# Patient Record
Sex: MALE | Race: OTHER | NOT HISPANIC OR LATINO | URBAN - METROPOLITAN AREA
[De-identification: names, ages, dates, MRNs, and addresses within clinical notes are randomized per-mention and may not be internally consistent; named-entity substitution may affect disease eponyms.]

---

## 2019-01-18 ENCOUNTER — INPATIENT (INPATIENT)
Facility: HOSPITAL | Age: 69
LOS: 0 days | Discharge: HOME | DRG: 287 | End: 2019-01-18
Attending: INTERNAL MEDICINE | Admitting: INTERNAL MEDICINE
Payer: COMMERCIAL

## 2019-01-18 ENCOUNTER — TRANSCRIPTION ENCOUNTER (OUTPATIENT)
Age: 69
End: 2019-01-18

## 2019-01-18 VITALS
DIASTOLIC BLOOD PRESSURE: 84 MMHG | OXYGEN SATURATION: 98 % | SYSTOLIC BLOOD PRESSURE: 166 MMHG | TEMPERATURE: 98 F | HEART RATE: 74 BPM | RESPIRATION RATE: 18 BRPM | WEIGHT: 210.1 LBS

## 2019-01-18 DIAGNOSIS — I25.10 ATHEROSCLEROTIC HEART DISEASE OF NATIVE CORONARY ARTERY WITHOUT ANGINA PECTORIS: ICD-10-CM

## 2019-01-18 DIAGNOSIS — Z90.49 ACQUIRED ABSENCE OF OTHER SPECIFIED PARTS OF DIGESTIVE TRACT: Chronic | ICD-10-CM

## 2019-01-18 DIAGNOSIS — I10 ESSENTIAL (PRIMARY) HYPERTENSION: ICD-10-CM

## 2019-01-18 DIAGNOSIS — Z98.890 OTHER SPECIFIED POSTPROCEDURAL STATES: Chronic | ICD-10-CM

## 2019-01-18 DIAGNOSIS — I48.2 CHRONIC ATRIAL FIBRILLATION: ICD-10-CM

## 2019-01-18 LAB
ALBUMIN SERPL ELPH-MCNC: 4.5 G/DL — SIGNIFICANT CHANGE UP (ref 3.3–5)
ALP SERPL-CCNC: 126 U/L — HIGH (ref 40–120)
ALT FLD-CCNC: 96 U/L — HIGH (ref 10–45)
ANION GAP SERPL CALC-SCNC: 10 MMOL/L — SIGNIFICANT CHANGE UP (ref 5–17)
APTT BLD: 32.8 SEC — SIGNIFICANT CHANGE UP (ref 27.5–36.3)
AST SERPL-CCNC: 48 U/L — HIGH (ref 10–40)
BASOPHILS NFR BLD AUTO: 0.6 % — SIGNIFICANT CHANGE UP (ref 0–2)
BILIRUB SERPL-MCNC: 1.2 MG/DL — SIGNIFICANT CHANGE UP (ref 0.2–1.2)
BUN SERPL-MCNC: 22 MG/DL — SIGNIFICANT CHANGE UP (ref 7–23)
CALCIUM SERPL-MCNC: 9.1 MG/DL — SIGNIFICANT CHANGE UP (ref 8.4–10.5)
CHLORIDE SERPL-SCNC: 106 MMOL/L — SIGNIFICANT CHANGE UP (ref 96–108)
CK MB CFR SERPL CALC: 2.5 NG/ML — SIGNIFICANT CHANGE UP (ref 0–6.7)
CO2 SERPL-SCNC: 27 MMOL/L — SIGNIFICANT CHANGE UP (ref 22–31)
CREAT SERPL-MCNC: 0.67 MG/DL — SIGNIFICANT CHANGE UP (ref 0.5–1.3)
EOSINOPHIL NFR BLD AUTO: 3.5 % — SIGNIFICANT CHANGE UP (ref 0–6)
GLUCOSE SERPL-MCNC: 152 MG/DL — HIGH (ref 70–99)
HCT VFR BLD CALC: 45.5 % — SIGNIFICANT CHANGE UP (ref 39–50)
HGB BLD-MCNC: 15.2 G/DL — SIGNIFICANT CHANGE UP (ref 13–17)
INR BLD: 1.11 — SIGNIFICANT CHANGE UP (ref 0.88–1.16)
LYMPHOCYTES # BLD AUTO: 36.9 % — SIGNIFICANT CHANGE UP (ref 13–44)
MCHC RBC-ENTMCNC: 29.8 PG — SIGNIFICANT CHANGE UP (ref 27–34)
MCHC RBC-ENTMCNC: 33.4 G/DL — SIGNIFICANT CHANGE UP (ref 32–36)
MCV RBC AUTO: 89.2 FL — SIGNIFICANT CHANGE UP (ref 80–100)
MONOCYTES NFR BLD AUTO: 9.2 % — SIGNIFICANT CHANGE UP (ref 2–14)
NEUTROPHILS NFR BLD AUTO: 49.8 % — SIGNIFICANT CHANGE UP (ref 43–77)
PLATELET # BLD AUTO: 80 K/UL — LOW (ref 150–400)
POTASSIUM SERPL-MCNC: 3.9 MMOL/L — SIGNIFICANT CHANGE UP (ref 3.5–5.3)
POTASSIUM SERPL-SCNC: 3.9 MMOL/L — SIGNIFICANT CHANGE UP (ref 3.5–5.3)
PROT SERPL-MCNC: 6.8 G/DL — SIGNIFICANT CHANGE UP (ref 6–8.3)
PROTHROM AB SERPL-ACNC: 12.6 SEC — SIGNIFICANT CHANGE UP (ref 10–12.9)
RBC # BLD: 5.1 M/UL — SIGNIFICANT CHANGE UP (ref 4.2–5.8)
RBC # FLD: 14.9 % — SIGNIFICANT CHANGE UP (ref 10.3–16.9)
SODIUM SERPL-SCNC: 143 MMOL/L — SIGNIFICANT CHANGE UP (ref 135–145)
TROPONIN T SERPL-MCNC: <0.01 NG/ML — SIGNIFICANT CHANGE UP (ref 0–0.01)
WBC # BLD: 3.1 K/UL — LOW (ref 3.8–10.5)
WBC # FLD AUTO: 3.1 K/UL — LOW (ref 3.8–10.5)

## 2019-01-18 PROCEDURE — 71046 X-RAY EXAM CHEST 2 VIEWS: CPT | Mod: 26

## 2019-01-18 PROCEDURE — 93458 L HRT ARTERY/VENTRICLE ANGIO: CPT | Mod: 26

## 2019-01-18 PROCEDURE — 99285 EMERGENCY DEPT VISIT HI MDM: CPT

## 2019-01-18 PROCEDURE — 99223 1ST HOSP IP/OBS HIGH 75: CPT | Mod: AI

## 2019-01-18 RX ORDER — DEXTROSE 50 % IN WATER 50 %
12.5 SYRINGE (ML) INTRAVENOUS ONCE
Refills: 0 | Status: DISCONTINUED | OUTPATIENT
Start: 2019-01-18 | End: 2019-01-18

## 2019-01-18 RX ORDER — SODIUM CHLORIDE 9 MG/ML
1000 INJECTION, SOLUTION INTRAVENOUS
Refills: 0 | Status: DISCONTINUED | OUTPATIENT
Start: 2019-01-18 | End: 2019-01-18

## 2019-01-18 RX ORDER — GLUCAGON INJECTION, SOLUTION 0.5 MG/.1ML
1 INJECTION, SOLUTION SUBCUTANEOUS ONCE
Refills: 0 | Status: DISCONTINUED | OUTPATIENT
Start: 2019-01-18 | End: 2019-01-18

## 2019-01-18 RX ORDER — DEXTROSE 50 % IN WATER 50 %
15 SYRINGE (ML) INTRAVENOUS ONCE
Refills: 0 | Status: DISCONTINUED | OUTPATIENT
Start: 2019-01-18 | End: 2019-01-18

## 2019-01-18 RX ORDER — SODIUM CHLORIDE 9 MG/ML
500 INJECTION INTRAMUSCULAR; INTRAVENOUS; SUBCUTANEOUS
Refills: 0 | Status: DISCONTINUED | OUTPATIENT
Start: 2019-01-18 | End: 2019-01-18

## 2019-01-18 RX ORDER — CLOPIDOGREL BISULFATE 75 MG/1
300 TABLET, FILM COATED ORAL ONCE
Refills: 0 | Status: COMPLETED | OUTPATIENT
Start: 2019-01-18 | End: 2019-01-18

## 2019-01-18 RX ORDER — INSULIN LISPRO 100/ML
VIAL (ML) SUBCUTANEOUS
Refills: 0 | Status: DISCONTINUED | OUTPATIENT
Start: 2019-01-18 | End: 2019-01-18

## 2019-01-18 RX ORDER — ASPIRIN/CALCIUM CARB/MAGNESIUM 324 MG
325 TABLET ORAL ONCE
Refills: 0 | Status: COMPLETED | OUTPATIENT
Start: 2019-01-18 | End: 2019-01-18

## 2019-01-18 RX ORDER — DEXTROSE 50 % IN WATER 50 %
25 SYRINGE (ML) INTRAVENOUS ONCE
Refills: 0 | Status: DISCONTINUED | OUTPATIENT
Start: 2019-01-18 | End: 2019-01-18

## 2019-01-18 RX ADMIN — Medication 325 MILLIGRAM(S): at 08:12

## 2019-01-18 RX ADMIN — CLOPIDOGREL BISULFATE 300 MILLIGRAM(S): 75 TABLET, FILM COATED ORAL at 08:18

## 2019-01-18 NOTE — H&P ADULT - PROBLEM SELECTOR PLAN 1
Currently   -trop  -EKG NSR @ 69 bpm without ischemic changes  - mg x1 and plavix 300 mg x1 given in ED.  -Additional plavix 300 mg x1 ordered  -precath/consented   - Currently chest pain free   -trop <0.01  -EKG NSR @ 69 bpm, TWI III, AVR    - mg x1 and plavix 300 mg x1 given in ED.  - No Additional plavix given per Dr. Villareal plts 80  -precath/consented   -Pre cath fluids with NS @ 75 cc/hr, euvolemic on exam, frequent lung checks  -per pt last ECHO 1 year ago with EF 50%

## 2019-01-18 NOTE — ED PROVIDER NOTE - OBJECTIVE STATEMENT
69 yo M with pmh of HTn, DM, afib s/p ablation x 2 on eliquis, BPH c/o chest tightness that started at 7pm last night. Pain located in L sternal border. Pain comes and goes lasting about 15 min at a time. Last episode was 4am this morning. Pt took a nitro last night  which helped. Denies fever, chills, cough, sob, palpitations, sweats, n/v.  Pt had a cath 8 years ago with no stents and last stress test  was a few years ago that was normal.

## 2019-01-18 NOTE — H&P ADULT - NSHPLABSRESULTS_GEN_ALL_CORE
15.2   3.1   )-----------( 80       ( 18 Jan 2019 07:56 )             45.5       01-18    143  |  106  |  22  ----------------------------<  152<H>  3.9   |  27  |  0.67    Ca    9.1      18 Jan 2019 07:56    TPro  6.8  /  Alb  4.5  /  TBili  1.2  /  DBili  x   /  AST  48<H>  /  ALT  96<H>  /  AlkPhos  126<H>  01-18      PT/INR - ( 18 Jan 2019 07:56 )   PT: 12.6 sec;   INR: 1.11          PTT - ( 18 Jan 2019 07:56 )  PTT:32.8 sec    CARDIAC MARKERS ( 18 Jan 2019 07:56 )  x     / <0.01 ng/mL / 130 U/L / x     / 2.5 ng/mL            EKG:

## 2019-01-18 NOTE — H&P ADULT - FAMILY HISTORY
Father  Still living? Unknown  Family history of diabetes mellitus, Age at diagnosis: Age Unknown     Mother  Still living? Unknown  Family history of diabetes mellitus, Age at diagnosis: Age Unknown     Sibling  Still living? Unknown  Family history of coronary artery disease, Age at diagnosis: Age Unknown

## 2019-01-18 NOTE — DISCHARGE NOTE ADULT - CARE PROVIDER_API CALL
Nura Fagan), Cardiovascular Disease  [address] [place] [address]  Phone: [phone]  Fax: [phone]

## 2019-01-18 NOTE — H&P ADULT - ATTENDING COMMENTS
Assessment: Patient personally seen and examined myself during rounds with the Physician Assistant/House Staff/Nurse Practitioner *  ON DATE 1/18/19  Physician Assistant/House Staff/Nurse Practitioner note read, including vitals, physical findings, laboratory data, and radiological reports.   Revisions included below.   Direct personal management at bed side and extensive interpretation of the data.    Plan was outlined and discussed in details with the Physician Assistant/House Staff/Nurse Practitioner.    Decision making of high complexity   Risk high of complications, morbidity, and/or mortality  Assessment and Action taken for acute disease activity to reflect the level of care provided:  -Hemodynamic evaluation and support  -ACS assessment and treatment as applicable  -Heart failure assessment and treatment as applicable  -Cardiac Telemetry reviewed  -Medication reconciliation  -Review laboratory data  -EKG reviewed - no stemi  -Echo ordered  -Interdisciplinary discussion with IC / EP / HF / CTS teams as needed  TIME SPENT in evaluation and management, reassessments, review and interpretation of labs and x-rays, and hemodynamic management, formulating a plan and coordinating care: ___70____ MIN.  Time does not include procedural time.    Leigh Simmons MD  Cardiology    Mobile: [phone]  Office: [phone]  [address] [place], [place] [address]

## 2019-01-18 NOTE — DISCHARGE NOTE ADULT - PATIENT PORTAL LINK FT
You can access the [facility] Patient Portal, offered by [facility], by registering with the following website: [email or web address]

## 2019-01-18 NOTE — DISCHARGE NOTE ADULT - MEDICATION SUMMARY - MEDICATIONS TO TAKE
I will START or STAY ON the medications listed below when I get home from the hospital:    Rapaflo 8 mg oral capsule  -- 1 cap(s) by mouth once a day  -- Indication: For BPH (benign prostatic hyperplasia)    Eliquis 2.5 mg oral tablet  -- 1 tab(s) by mouth 2 times a day  -- Indication: For atrial fibrillation, can resume tonight     Zoloft 50 mg oral tablet  -- 1 tab(s) by mouth once a day  -- Indication: For anxiety     Glucophage 500 mg oral tablet  -- 1 tab(s) by mouth 2 times a day  -- Indication: For HOLD AND RESTART ON MONDAY 1/21/19    Ozempic (0.25 mg or 0.5 mg dose) 2 mg/1.5 mL subcutaneous solution  -- subcutaneous once a week  -- Indication: For Diabetes mellitus    metoprolol succinate 100 mg oral tablet, extended release  -- 1 tab(s) by mouth once a day  -- Indication: For atrial fibrillation. heart rate

## 2019-01-18 NOTE — ED PROVIDER NOTE - ATTENDING CONTRIBUTION TO CARE
Pt with afib, s/p ablation on Eliquis, DM, HTN presenting with L sternal chest pain since last night, tightness. Lasted 15 minutes, relieved with NTG. Plan for admission to cardiology to expedite cath. Will admit to bhusri, aspirin and plavix load.

## 2019-01-18 NOTE — H&P ADULT - ASSESSMENT
Risks & benefits of procedure and alternative therapy have been explained to the patient including but not limited to: allergic reaction, bleeding w/possible need for blood transfusion, infection, renal and vascular compromise, limb damage, arrhythmia, stroke, vessel dissection/perforation, Myocardial infarction, emergent CABG. Informed consent obtained and in chart. 71 yo M with pmh of HTN, DM, paroxysmal afib s/p ablation x 2 on Eliquis (last dose 1/16/19), thrombocytopenia, non-obstructive CAD by cath 8 years ago ([facility]) who presented to the ED c/o intermittent L sided chest tightness with radiation to L arm 5/10 dull in quality that started at 10pm last night occurring at rest. In ED pt given aspirin 325 mg x1 and Plavix 300 mg x1. Pt admitted to 5 uris for cardiac catheterization with Dr. Villareal today.       Risks & benefits of procedure and alternative therapy have been explained to the patient including but not limited to: allergic reaction, bleeding w/possible need for blood transfusion, infection, renal and vascular compromise, limb damage, arrhythmia, stroke, vessel dissection/perforation, Myocardial infarction, emergent CABG. Informed consent obtained and in chart.

## 2019-01-18 NOTE — ED PROVIDER NOTE - MEDICAL DECISION MAKING DETAILS
71 yo M with pmh of HTn, DM, afib s/p ablation x 2 on eliquis, BPH c/o chest tightness that started at 7pm last night. Last episode 4am this morning. No sob. VSS. EKg NSR @69. PE unremarkable. r/o acs

## 2019-01-18 NOTE — ED ADULT NURSE NOTE - NSIMPLEMENTINTERV_GEN_ALL_ED
Implemented All Universal Safety Interventions:  [place] to call system. Call bell, personal items and telephone within reach. Instruct patient to call for assistance. Room bathroom lighting operational. Non-slip footwear when patient is off stretcher. Physically safe environment: no spills, clutter or unnecessary equipment. Stretcher in lowest position, wheels locked, appropriate side rails in place.

## 2019-01-18 NOTE — H&P ADULT - HISTORY OF PRESENT ILLNESS
SKELETON   69 yo M with pmh of HTN, DM, afib s/p ablation x 2 on Eliquis (last dose______), non-obstructive CAD by cath 8 years ago (at ________) who presented to the ED c/o intermittent L sided chest tightness that started at 7pm last night. Pain lasting about 15 min at a time with last episode at 4am this morning. Pain resolved with SLNTG.  Last stress test was a few years ago that was normal.  Pt denies fever, chills, cough, sob, palpitations, sweats, n/v  In ED T 98.1, HR 74, /84, RR 18, O2 98%RA. EKG NSR @ 69 bpm without ischemic changes, Labs significant for   In ED pt given aspirin 325 mg x1 and Plavix 300 mg x1. [facility] with plan for cardiac catheterization with Dr. Dionna calabrese. SKELETON   69 yo M with pmh of HTN, DM, afib s/p ablation x 2 on Eliquis (last dose______), non-obstructive CAD by cath 8 years ago (at ________) who presented to the ED c/o intermittent L sided chest tightness that started at 7pm last night. Pain lasting about 15 min at a time with last episode at 4am this morning. Pain resolved with SLNTG.  Last stress test was a few years ago that was normal.  Pt denies fever, chills, cough, sob, palpitations, sweats, n/v  In ED T 98.1, HR 74, /84, RR 18, O2 98%RA. EKG NSR @ 69 bpm without ischemic changes, Labs significant for trop negative x1  In ED pt given aspirin 325 mg x1 and Plavix 300 mg x1. Pt admitted to [address] [place] [address] with plan for cardiac catheterization with Dr. Dionna calabrese. 69 yo M with pmh of HTN, DM, paroxysmal afib s/p ablation x 2 on Eliquis (last dose 1/16/19), thrombocytopenia, non-obstructive CAD by cath 8 years ago ([facility]) who presented to the ED c/o intermittent L sided chest tightness with radiation to L arm 5/10 dull in quality that started at 10pm last night occurring at rest. Pain lasting about 15 min at a time with last episode at 4am this morning. Pain resolved with SLNTG x1.  Last stress test and ECHO 1 year ago with Dr. Fagan both normal per patient.  Pt denies fever, chills, cough, sob, palpitations, sweats, n/v, LE edema, claudication, melena, hematuria, abdominal pain. Pt reports chronically elevated LFT's for which he has extensive outpatient GI workup in the past year including normal liver biopsy and normal MRCP.   In ED T 98.1, HR 74, /84, RR 18, O2 98%RA. EKG NSR @ 69 bpm with TWI III, AVR. Labs significant for trop negative x1. Platelets 80, Hgb 15.2, HCT 45.5. ALT 96, AST 48, Alk P 126.  In ED pt given aspirin 325 mg x1 and Plavix 300 mg x1. Pt admitted to [address] [place] [address] with plan for cardiac catheterization with Dr. Villareal today.

## 2019-01-18 NOTE — DISCHARGE NOTE ADULT - PLAN OF CARE
Please follow up with your cardiologist Dr. Fagan in 1-2 weeks of discharge. You underwent a diagnostic cardiac catheterization showing non obstructive coronary artery disease without any stents needed. The procedure was done through the right wrist. The dressing can be removed after 24 hours and you do not need to keep this area covered. After removing the dressing you may shower, no heavy scrubbing of the wrist. No soaking in a bath, hot tub or swimming for 5 days.	Please avoid any heavy lifting  (no more than 3 to 5 lbs) or strenuous activity for five days  If you develop any swelling, bleeding, hardening of the skin (hematoma formation), acute pain, numbness/tingling  in your arm or leg please contact your doctor immediately or call our 24/7 line: [phone] Please resume Eliquis 2.5 mg twice daily and continue metoprolol succinate 100 mg daily Please HOLD glucophage (metformin) and RESUME Mon 1/21/19 If you are a diabetic and you take Metformin: DO NOT TAKE your Metformin for two days. This medication can interact with the contrast used during your procedure therefore we want to ensure the contrast has left your body prior to you restarting your Metformin.   Make sure you monitor your finger sticks and diet while you are not taking your Metformin!

## 2019-01-18 NOTE — DISCHARGE NOTE ADULT - HOSPITAL COURSE
69 yo M with pmh of HTN, DM, paroxysmal afib s/p ablation x 2 on Eliquis (last dose 1/16/19), thrombocytopenia, non-obstructive CAD by cath 8 years ago ([facility]) who presented to the ED c/o intermittent L sided chest tightness with radiation to L arm 5/10 dull in quality that started at 10pm last night occurring at rest. Pain lasting about 15 min at a time with last episode at 4am this morning. Pain resolved with SLNTG x1.  Last stress test and ECHO 1 year ago with Dr. Fagan both normal per patient.  Pt denies fever, chills, cough, sob, palpitations, sweats, n/v, LE edema, claudication, melena, hematuria, abdominal pain. Pt reports chronically elevated LFT's for which he has extensive outpatient GI workup in the past year including normal liver biopsy and normal MRCP.   In ED T 98.1, HR 74, /84, RR 18, O2 98%RA. EKG NSR @ 69 bpm with TWI III, AVR. Labs significant for trop negative x1. Platelets 80, Hgb 15.2, HCT 45.5. ALT 96, AST 48, Alk P 126.  In ED pt given aspirin 325 mg x1 and Plavix 300 mg x1. Pt admitted to [address] [place] [address] with plan for cardiac catheterization with Dr. Villareal today.  Pt now s/p diagnostic cath with mRCA 50%, dLCx 60%, and mLAD myocardial bridge. EF 60%. R radial access 71 yo M with pmh of HTN, DM, paroxysmal afib s/p ablation x 2 on Eliquis (last dose 1/16/19), thrombocytopenia, non-obstructive CAD by cath 8 years ago ([facility]) who presented to the ED c/o intermittent L sided chest tightness with radiation to L arm 5/10 dull in quality that started at 10pm last night occurring at rest. Pain lasting about 15 min at a time with last episode at 4am this morning. Pain resolved with SLNTG x1.  Last stress test and ECHO 1 year ago with Dr. Fagan both normal per patient.  Pt denies fever, chills, cough, sob, palpitations, sweats, n/v, LE edema, claudication, melena, hematuria, abdominal pain. Pt reports chronically elevated LFT's for which he has extensive outpatient GI workup in the past year including normal liver biopsy and normal MRCP.   In ED T 98.1, HR 74, /84, RR 18, O2 98%RA. EKG NSR @ 69 bpm with TWI III, AVR. Labs significant for trop negative x1. Platelets 80, Hgb 15.2, HCT 45.5. ALT 96, AST 48, Alk P 126.  In ED pt given aspirin 325 mg x1 and Plavix 300 mg x1. Pt admitted to [address] [place] [address] with plan for cardiac catheterization with Dr. Villareal today.  Pt now s/p diagnostic cath with mRCA 50%, dLCx 60%, and mLAD myocardial bridge. EF 60%. R radial access. Radial site stable without hematoma or bleeding prior to discharge, +2 pulse. Pt deemed stable for discharge per Dr. Simmons and will follow up with Dr. Fagan in 1-2 weeks.

## 2019-01-18 NOTE — H&P ADULT - PMH
BPH (benign prostatic hyperplasia)    Chronic atrial fibrillation    Coronary artery disease    Hypertension

## 2019-01-24 DIAGNOSIS — R07.9 CHEST PAIN, UNSPECIFIED: ICD-10-CM

## 2019-01-24 DIAGNOSIS — I48.2 CHRONIC ATRIAL FIBRILLATION: ICD-10-CM

## 2019-01-24 DIAGNOSIS — I10 ESSENTIAL (PRIMARY) HYPERTENSION: ICD-10-CM

## 2019-01-24 DIAGNOSIS — E11.9 TYPE 2 DIABETES MELLITUS WITHOUT COMPLICATIONS: ICD-10-CM

## 2019-01-24 DIAGNOSIS — I25.119 ATHEROSCLEROTIC HEART DISEASE OF NATIVE CORONARY ARTERY WITH UNSPECIFIED ANGINA PECTORIS: ICD-10-CM

## 2021-08-11 NOTE — DISCHARGE NOTE ADULT - ABILITY TO HEAR (WITH HEARING AID OR HEARING APPLIANCE IF NORMALLY USED):
Adequate: hears normal conversation without difficulty Azathioprine Pregnancy And Lactation Text: This medication is Pregnancy Category D and isn't considered safe during pregnancy. It is unknown if this medication is excreted in breast milk.

## 2022-11-29 NOTE — ED PROVIDER NOTE - NSCAREINITIATED _GEN_ER
Rachel Doe(PA) Klisyri Counseling:  I discussed with the patient the risks of Klisyri including but not limited to erythema, scaling, itching, weeping, crusting, and pain.

## 2023-04-12 ENCOUNTER — APPOINTMENT (OUTPATIENT)
Dept: HEART AND VASCULAR | Facility: CLINIC | Age: 73
End: 2023-04-12

## 2023-04-18 PROBLEM — Z00.00 ENCOUNTER FOR PREVENTIVE HEALTH EXAMINATION: Status: ACTIVE | Noted: 2023-04-18

## 2023-09-18 PROBLEM — F32.9 MAJOR DEPRESSIVE DISORDER, SINGLE EPISODE, UNSPECIFIED: Chronic | Status: ACTIVE | Noted: 2018-10-05

## 2023-09-18 PROBLEM — N40.0 BENIGN PROSTATIC HYPERPLASIA WITHOUT LOWER URINARY TRACT SYMPTOMS: Chronic | Status: ACTIVE | Noted: 2018-10-05

## 2023-09-18 PROBLEM — E11.9 TYPE 2 DIABETES MELLITUS WITHOUT COMPLICATIONS: Chronic | Status: ACTIVE | Noted: 2018-10-05

## 2023-09-18 PROBLEM — I48.0 PAROXYSMAL ATRIAL FIBRILLATION: Chronic | Status: ACTIVE | Noted: 2018-10-05

## 2023-09-18 PROBLEM — R74.0 NONSPECIFIC ELEVATION OF LEVELS OF TRANSAMINASE AND LACTIC ACID DEHYDROGENASE [LDH]: Chronic | Status: ACTIVE | Noted: 2018-10-05

## 2023-09-20 ENCOUNTER — APPOINTMENT (OUTPATIENT)
Dept: INTERVENTIONAL RADIOLOGY/VASCULAR | Facility: HOSPITAL | Age: 73
End: 2023-09-20
Payer: MEDICARE

## 2023-09-20 DIAGNOSIS — Z80.9 FAMILY HISTORY OF MALIGNANT NEOPLASM, UNSPECIFIED: ICD-10-CM

## 2023-09-20 DIAGNOSIS — E13.9 OTHER SPECIFIED DIABETES MELLITUS W/OUT COMPLICATIONS: ICD-10-CM

## 2023-09-20 DIAGNOSIS — Z78.9 OTHER SPECIFIED HEALTH STATUS: ICD-10-CM

## 2023-09-20 DIAGNOSIS — N40.1 BENIGN PROSTATIC HYPERPLASIA WITH LOWER URINARY TRACT SYMPMS: ICD-10-CM

## 2023-09-20 PROCEDURE — 99203 OFFICE O/P NEW LOW 30 MIN: CPT | Mod: 95

## 2023-09-21 ENCOUNTER — APPOINTMENT (OUTPATIENT)
Dept: TRANSPLANT | Facility: CLINIC | Age: 73
End: 2023-09-21

## 2023-09-22 ENCOUNTER — APPOINTMENT (OUTPATIENT)
Dept: RADIATION ONCOLOGY | Facility: CLINIC | Age: 73
End: 2023-09-22
Payer: MEDICARE

## 2023-09-22 VITALS
SYSTOLIC BLOOD PRESSURE: 134 MMHG | WEIGHT: 200 LBS | RESPIRATION RATE: 16 BRPM | DIASTOLIC BLOOD PRESSURE: 75 MMHG | HEIGHT: 71 IN | OXYGEN SATURATION: 97 % | HEART RATE: 60 BPM | BODY MASS INDEX: 28 KG/M2

## 2023-09-22 PROCEDURE — 99204 OFFICE O/P NEW MOD 45 MIN: CPT | Mod: 25

## 2023-09-22 PROCEDURE — 99205 OFFICE O/P NEW HI 60 MIN: CPT | Mod: 25

## 2023-09-22 RX ORDER — METFORMIN HYDROCHLORIDE 1000 MG/1
TABLET, FILM COATED ORAL
Refills: 0 | Status: DISCONTINUED | COMMUNITY
End: 2023-09-08

## 2023-09-25 ENCOUNTER — OUTPATIENT (OUTPATIENT)
Dept: OUTPATIENT SERVICES | Facility: HOSPITAL | Age: 73
LOS: 1 days | End: 2023-09-25
Payer: MEDICARE

## 2023-09-25 ENCOUNTER — RESULT REVIEW (OUTPATIENT)
Age: 73
End: 2023-09-25

## 2023-09-25 ENCOUNTER — APPOINTMENT (OUTPATIENT)
Dept: INTERVENTIONAL RADIOLOGY/VASCULAR | Facility: HOSPITAL | Age: 73
End: 2023-09-25

## 2023-09-25 VITALS
RESPIRATION RATE: 20 BRPM | WEIGHT: 209.44 LBS | HEART RATE: 63 BPM | DIASTOLIC BLOOD PRESSURE: 76 MMHG | TEMPERATURE: 98 F | SYSTOLIC BLOOD PRESSURE: 170 MMHG | HEIGHT: 72 IN | OXYGEN SATURATION: 97 %

## 2023-09-25 DIAGNOSIS — Z98.890 OTHER SPECIFIED POSTPROCEDURAL STATES: Chronic | ICD-10-CM

## 2023-09-25 DIAGNOSIS — Z90.49 ACQUIRED ABSENCE OF OTHER SPECIFIED PARTS OF DIGESTIVE TRACT: Chronic | ICD-10-CM

## 2023-09-25 LAB
ALBUMIN SERPL ELPH-MCNC: 4 G/DL — SIGNIFICANT CHANGE UP (ref 3.3–5)
ALP SERPL-CCNC: 236 U/L — HIGH (ref 40–120)
ALT FLD-CCNC: 176 U/L — HIGH (ref 10–45)
ANION GAP SERPL CALC-SCNC: 7 MMOL/L — SIGNIFICANT CHANGE UP (ref 5–17)
AST SERPL-CCNC: 121 U/L — HIGH (ref 10–40)
BILIRUB SERPL-MCNC: 2.2 MG/DL — HIGH (ref 0.2–1.2)
BUN SERPL-MCNC: 16 MG/DL — SIGNIFICANT CHANGE UP (ref 7–23)
CALCIUM SERPL-MCNC: 9 MG/DL — SIGNIFICANT CHANGE UP (ref 8.4–10.5)
CHLORIDE SERPL-SCNC: 104 MMOL/L — SIGNIFICANT CHANGE UP (ref 96–108)
CO2 SERPL-SCNC: 28 MMOL/L — SIGNIFICANT CHANGE UP (ref 22–31)
CREAT SERPL-MCNC: 0.61 MG/DL — SIGNIFICANT CHANGE UP (ref 0.5–1.3)
EGFR: 102 ML/MIN/1.73M2 — SIGNIFICANT CHANGE UP
GLUCOSE SERPL-MCNC: 147 MG/DL — HIGH (ref 70–99)
POTASSIUM SERPL-MCNC: 4.3 MMOL/L — SIGNIFICANT CHANGE UP (ref 3.5–5.3)
POTASSIUM SERPL-SCNC: 4.3 MMOL/L — SIGNIFICANT CHANGE UP (ref 3.5–5.3)
PROT SERPL-MCNC: 6.8 G/DL — SIGNIFICANT CHANGE UP (ref 6–8.3)
SODIUM SERPL-SCNC: 139 MMOL/L — SIGNIFICANT CHANGE UP (ref 135–145)

## 2023-09-25 PROCEDURE — 78830 RP LOCLZJ TUM SPECT W/CT 1: CPT

## 2023-09-25 PROCEDURE — 77263 THER RADIOLOGY TX PLNG CPLX: CPT

## 2023-09-25 PROCEDURE — 77300 RADIATION THERAPY DOSE PLAN: CPT

## 2023-09-25 PROCEDURE — 77470 SPECIAL RADIATION TREATMENT: CPT

## 2023-09-25 PROCEDURE — 75774 ARTERY X-RAY EACH VESSEL: CPT | Mod: 26

## 2023-09-25 PROCEDURE — 76380 CAT SCAN FOLLOW-UP STUDY: CPT | Mod: 26

## 2023-09-25 PROCEDURE — C1894: CPT

## 2023-09-25 PROCEDURE — C1769: CPT

## 2023-09-25 PROCEDURE — 75726 ARTERY X-RAYS ABDOMEN: CPT | Mod: 26,59

## 2023-09-25 PROCEDURE — 77300 RADIATION THERAPY DOSE PLAN: CPT | Mod: 26

## 2023-09-25 PROCEDURE — 36245 INS CATH ABD/L-EXT ART 1ST: CPT | Mod: 59

## 2023-09-25 PROCEDURE — 77290 THER RAD SIMULAJ FIELD CPLX: CPT

## 2023-09-25 PROCEDURE — 36248 INS CATH ABD/L-EXT ART ADDL: CPT | Mod: 59

## 2023-09-25 PROCEDURE — 78830 RP LOCLZJ TUM SPECT W/CT 1: CPT | Mod: 26

## 2023-09-25 PROCEDURE — 36247 INS CATH ABD/L-EXT ART 3RD: CPT

## 2023-09-25 PROCEDURE — 75726 ARTERY X-RAYS ABDOMEN: CPT

## 2023-09-25 PROCEDURE — 36248 INS CATH ABD/L-EXT ART ADDL: CPT

## 2023-09-25 PROCEDURE — A9540: CPT

## 2023-09-25 PROCEDURE — C1887: CPT

## 2023-09-25 PROCEDURE — 77290 THER RAD SIMULAJ FIELD CPLX: CPT | Mod: 26

## 2023-09-25 PROCEDURE — 75774 ARTERY X-RAY EACH VESSEL: CPT | Mod: 59

## 2023-09-25 PROCEDURE — 77470 SPECIAL RADIATION TREATMENT: CPT | Mod: 26

## 2023-09-25 PROCEDURE — 76380 CAT SCAN FOLLOW-UP STUDY: CPT

## 2023-09-25 NOTE — PRE-ANESTHESIA EVALUATION ADULT - NSANTHHOMEMEDSFT_GEN_ALL_CORE
ADDITIONAL MEDS: Glucophage (last dose 2 week ago), Zoloft, Rapaflo, Toprol, Eliquis, Ozempic (last dose 1 month ago)

## 2023-09-25 NOTE — PRE-ANESTHESIA EVALUATION ADULT - NSANTHPMHFT_GEN_ALL_CORE
Additional PMHx: Herniated Disc Disease Cervical and Lumbar area, NIDDM, BPH,                             Cryptogenic Liver Cirrhosis, Portal HTN with Splenomegaly and Varices    PSxHx: Cevical Laminectomy, Lumbar Laminectomy, Cholecystectomy, Atrial Fibrillation Ablation all GA no complication

## 2023-09-28 ENCOUNTER — RESULT REVIEW (OUTPATIENT)
Age: 73
End: 2023-09-28

## 2023-09-28 ENCOUNTER — APPOINTMENT (OUTPATIENT)
Dept: INTERVENTIONAL RADIOLOGY/VASCULAR | Facility: HOSPITAL | Age: 73
End: 2023-09-28

## 2023-09-28 ENCOUNTER — OUTPATIENT (OUTPATIENT)
Dept: OUTPATIENT SERVICES | Facility: HOSPITAL | Age: 73
LOS: 1 days | End: 2023-09-28
Payer: MEDICARE

## 2023-09-28 VITALS — WEIGHT: 209.44 LBS

## 2023-09-28 DIAGNOSIS — Z98.890 OTHER SPECIFIED POSTPROCEDURAL STATES: Chronic | ICD-10-CM

## 2023-09-28 DIAGNOSIS — Z90.49 ACQUIRED ABSENCE OF OTHER SPECIFIED PARTS OF DIGESTIVE TRACT: Chronic | ICD-10-CM

## 2023-09-28 LAB
ALBUMIN SERPL ELPH-MCNC: 3.7 G/DL — SIGNIFICANT CHANGE UP (ref 3.3–5)
ALP SERPL-CCNC: 213 U/L — HIGH (ref 40–120)
ALT FLD-CCNC: SIGNIFICANT CHANGE UP U/L (ref 10–45)
ANION GAP SERPL CALC-SCNC: 9 MMOL/L — SIGNIFICANT CHANGE UP (ref 5–17)
AST SERPL-CCNC: SIGNIFICANT CHANGE UP U/L (ref 10–40)
BILIRUB SERPL-MCNC: 2 MG/DL — HIGH (ref 0.2–1.2)
BUN SERPL-MCNC: 16 MG/DL — SIGNIFICANT CHANGE UP (ref 7–23)
CALCIUM SERPL-MCNC: 8.9 MG/DL — SIGNIFICANT CHANGE UP (ref 8.4–10.5)
CHLORIDE SERPL-SCNC: 106 MMOL/L — SIGNIFICANT CHANGE UP (ref 96–108)
CO2 SERPL-SCNC: 27 MMOL/L — SIGNIFICANT CHANGE UP (ref 22–31)
CREAT SERPL-MCNC: 0.53 MG/DL — SIGNIFICANT CHANGE UP (ref 0.5–1.3)
EGFR: 106 ML/MIN/1.73M2 — SIGNIFICANT CHANGE UP
GLUCOSE BLDC GLUCOMTR-MCNC: 117 MG/DL — HIGH (ref 70–99)
GLUCOSE SERPL-MCNC: 131 MG/DL — HIGH (ref 70–99)
POTASSIUM SERPL-MCNC: SIGNIFICANT CHANGE UP MMOL/L (ref 3.5–5.3)
POTASSIUM SERPL-SCNC: SIGNIFICANT CHANGE UP MMOL/L (ref 3.5–5.3)
PROT SERPL-MCNC: 6.3 G/DL — SIGNIFICANT CHANGE UP (ref 6–8.3)
SODIUM SERPL-SCNC: 142 MMOL/L — SIGNIFICANT CHANGE UP (ref 135–145)

## 2023-09-28 PROCEDURE — 79445 NUCLEAR RX INTRA-ARTERIAL: CPT

## 2023-09-28 PROCEDURE — C2616: CPT

## 2023-09-28 PROCEDURE — 37243 VASC EMBOLIZE/OCCLUDE ORGAN: CPT

## 2023-09-28 PROCEDURE — 36247 INS CATH ABD/L-EXT ART 3RD: CPT

## 2023-09-28 PROCEDURE — 36248 INS CATH ABD/L-EXT ART ADDL: CPT

## 2023-09-28 PROCEDURE — 79445 NUCLEAR RX INTRA-ARTERIAL: CPT | Mod: 26

## 2023-09-28 PROCEDURE — 82962 GLUCOSE BLOOD TEST: CPT

## 2023-09-28 PROCEDURE — 78830 RP LOCLZJ TUM SPECT W/CT 1: CPT | Mod: 26

## 2023-09-28 PROCEDURE — 80053 COMPREHEN METABOLIC PANEL: CPT

## 2023-09-28 PROCEDURE — 78830 RP LOCLZJ TUM SPECT W/CT 1: CPT

## 2023-09-28 PROCEDURE — C1769: CPT

## 2023-09-28 PROCEDURE — C1887: CPT

## 2023-09-28 PROCEDURE — 36415 COLL VENOUS BLD VENIPUNCTURE: CPT

## 2023-09-28 PROCEDURE — C1894: CPT

## 2023-09-28 RX ORDER — SODIUM CHLORIDE 9 MG/ML
1000 INJECTION, SOLUTION INTRAVENOUS
Refills: 0 | Status: DISCONTINUED | OUTPATIENT
Start: 2023-09-28 | End: 2023-10-12

## 2023-10-24 ENCOUNTER — APPOINTMENT (OUTPATIENT)
Dept: INTERVENTIONAL RADIOLOGY/VASCULAR | Facility: HOSPITAL | Age: 73
End: 2023-10-24

## 2023-10-24 PROBLEM — I10 ESSENTIAL (PRIMARY) HYPERTENSION: Chronic | Status: ACTIVE | Noted: 2019-01-18

## 2023-10-24 PROBLEM — I25.10 ATHEROSCLEROTIC HEART DISEASE OF NATIVE CORONARY ARTERY WITHOUT ANGINA PECTORIS: Chronic | Status: ACTIVE | Noted: 2019-01-18

## 2023-10-24 PROBLEM — I48.2 CHRONIC ATRIAL FIBRILLATION: Chronic | Status: ACTIVE | Noted: 2019-01-18

## 2023-10-26 VITALS
TEMPERATURE: 97 F | HEIGHT: 72 IN | HEART RATE: 72 BPM | WEIGHT: 199.96 LBS | DIASTOLIC BLOOD PRESSURE: 51 MMHG | SYSTOLIC BLOOD PRESSURE: 131 MMHG | RESPIRATION RATE: 17 BRPM | OXYGEN SATURATION: 97 %

## 2023-10-26 RX ORDER — CHLORHEXIDINE GLUCONATE 213 G/1000ML
1 SOLUTION TOPICAL ONCE
Refills: 0 | Status: DISCONTINUED | OUTPATIENT
Start: 2023-10-30 | End: 2023-11-13

## 2023-10-26 NOTE — H&P ADULT - NSHPLABSRESULTS_GEN_ALL_CORE
LABS:                          12.1   2.49  )-----------( 32       ( 30 Oct 2023 08:14 )             35.9     10-30    141  |  108  |  15  ----------------------------<  210<H>  3.8   |  29  |  0.48<L>    Ca    8.6      30 Oct 2023 08:14  Mg     2.2     10-30    TPro  5.8<L>  /  Alb  3.4  /  TBili  1.2  /  DBili  x   /  AST  57<H>  /  ALT  61<H>  /  AlkPhos  181<H>  10-30    LIVER FUNCTIONS - ( 30 Oct 2023 08:14 )  Alb: 3.4 g/dL / Pro: 5.8 g/dL / ALK PHOS: 181 U/L / ALT: 61 U/L / AST: 57 U/L / GGT: x           PT/INR - ( 30 Oct 2023 08:14 )   PT: 12.9 sec;   INR: 1.14          PTT - ( 30 Oct 2023 08:14 )  PTT:32.8 sec  Urinalysis Basic - ( 30 Oct 2023 08:14 )    Color: x / Appearance: x / SG: x / pH: x  Gluc: 210 mg/dL / Ketone: x  / Bili: x / Urobili: x   Blood: x / Protein: x / Nitrite: x   Leuk Esterase: x / RBC: x / WBC x   Sq Epi: x / Non Sq Epi: x / Bacteria: x

## 2023-10-26 NOTE — H&P ADULT - ASSESSMENT
71yo M, retired urologist, PMHx recently diagnosed hepatocellular carcinoma (s/o IR-guided radioembolization), portal HTN (on nadolol), thrombocytopenia (PLT 32k 10/30/2023), nonobstructive CAD (2019), HTN, HLD, pAfib (not on AC 2/2 throbocytopenia), depression/anxiety presented to cards c/o occasional AMBROSE. Of note patient undergoing liver transplant work-up. Currently Denies chest pain, palpitations, dizziness, LOC, N/V/D, fever/chills/sick contact, diaphoresis, orthopnea/PND, and leg swelling. NST 10/18/23: small fixed defect of apical inferior; small mild defect in mid and basal inferior wall; EF 85%. In light of patient's risk factors and abnormal NST results, patient is referred to [facility] for cardiac catheterization w/ possible intervention if clinically indicated as part of workup for liver transplant      - ASA III Mallampati II  - VSS  - Patient is a candidate for moderate sedation  - Catheterization as part of liver transplant workup  - Labs reviewed by PA - PLT 32K, Hgb 12.1, K 3.8  - Given potassium 20 mEq to replete K  - EKG - NSR no TWI/ST changes noted  - LOAD - given PLT count of 32K, patient only loaded with ASA 81 mg as per fellow  - FLUIDS - NS 250ml bolus given x1; NS 75ml/hr given x 2hr     Risks & benefits of procedure and alternative therapy have been explained to the patient including but not limited to: allergic reaction, bleeding w/possible need for blood transfusion, infection, renal and vascular compromise, limb damage, arrhythmia, stroke, vessel dissection/perforation, Myocardial infarction, emergent CABG. Informed consent obtained and in chart.

## 2023-10-26 NOTE — H&P ADULT - NSICDXPASTMEDICALHX_GEN_ALL_CORE_FT
PAST MEDICAL HISTORY:  BPH (benign prostatic hyperplasia)     BPH (benign prostatic hyperplasia)     Chronic atrial fibrillation     Coronary artery disease     Depression     Diabetes     Hypertension     Paroxysmal atrial fibrillation     Transaminitis

## 2023-10-26 NOTE — H&P ADULT - HISTORY OF PRESENT ILLNESS
INCOMPLETE    Cardiologist: Dr Davison  Pharmacy:   Escort:    CONFIRM LAST DOSE OF AC - PT UNABLE TO BE REACHED, VM NOT SET UP    71yo M PMHx nonobstructive CAD (2019), HTN, HLD, afib (on _______), recently diagnosed hepatocellular CA (s/p IR-guided radioembolization; planned for liver transplant) presented to cards c/o occasional AMBROSE _____. Denies chest pain, palpitations, dizziness, LOC, N/V/D, fever/chills/sick contact, diaphoresis, orthopnea/PND, and leg swelling. NST 10/18/23: small fixed defect of apical inferior; small mild defect in mid and basal inferior wall; EF 85%. In light of patient's risk factors, abnormal NST results, and CCS class ____ anginal/anginal-equivalent symptoms, patient is referred to [facility] for cardiac catheterization w/ possible intervention if clinically indicated.    s/p diagnostic cath 1/8/2019: mRCA 50%, dLCx 60%, and mLAD myocardial bridge. EF 60% Cardiologist: Dr Erlin Davison  Pharmacy:   Escort: Ronald Davison (internist)    73yo M, retired urologist, PMHx recently diagnosed hepatocellular carcinoma (s/o IR-guided radioembolization), thrombocytopenia (PLT 32k 10/30/2023) nonobstructive CAD (2019), HTN, HLD, afib (not on AC 2/2 throbocytopenia), presented to [facility] c/o occasional AMBROSE. Of note patient undergoing liver transplant work-up. Currently Denies chest pain, palpitations, dizziness, LOC, N/V/D, fever/chills/sick contact, diaphoresis, orthopnea/PND, and leg swelling. NST 10/18/23: small fixed defect of apical inferior; small mild defect in mid and basal inferior wall; EF 85%. In light of patient's risk factors and abnormal NST results, patient is referred to [facility] for cardiac catheterization w/ possible intervention if clinically indicated as part of workup for liver transplant    s/p diagnostic cath 1/8/2019: mRCA 50%, dLCx 60%, and mLAD myocardial bridge. EF 60% Cardiologist: Dr Erlin Davison  Pharmacy: [facility] ([address], [place] [address])  Escort: Ronald Davison (internist)    73yo M, retired urologist, PMHx recently diagnosed hepatocellular carcinoma (s/o IR-guided radioembolization), portal HTN (on nadolol), thrombocytopenia (PLT 32k 10/30/2023), nonobstructive CAD (2019), HTN, HLD, pAfib (not on AC 2/2 throbocytopenia), depression/anxiety presented to cards c/o occasional AMBROSE. Of note patient undergoing liver transplant work-up. Currently Denies chest pain, palpitations, dizziness, LOC, N/V/D, fever/chills/sick contact, diaphoresis, orthopnea/PND, and leg swelling. NST 10/18/23: small fixed defect of apical inferior; small mild defect in mid and basal inferior wall; EF 85%. In light of patient's risk factors and abnormal NST results, patient is referred to [facility] for cardiac catheterization w/ possible intervention if clinically indicated as part of workup for liver transplant    s/p diagnostic cath 1/8/2019: mRCA 50%, dLCx 60%, and mLAD myocardial bridge. EF 60%

## 2023-10-26 NOTE — H&P ADULT - NSICDXFAMILYHX_GEN_ALL_CORE_FT
FAMILY HISTORY:  Father  Still living? No  Family history of coronary artery disease, Age at diagnosis: Age Unknown  Family history of diabetes mellitus, Age at diagnosis: Age Unknown    Mother  Still living? Unknown  Family history of diabetes mellitus, Age at diagnosis: Age Unknown    Sibling  Still living? Unknown  Family history of coronary artery disease, Age at diagnosis: Age Unknown  Family history of coronary artery disease, Age at diagnosis: Age Unknown  Family history of coronary artery disease, Age at diagnosis: Age Unknown

## 2023-10-26 NOTE — H&P ADULT - NSICDXPASTSURGICALHX_GEN_ALL_CORE_FT
PAST SURGICAL HISTORY:  History of laparoscopic cholecystectomy     History of lumbar laminectomy

## 2023-10-30 ENCOUNTER — OUTPATIENT (OUTPATIENT)
Dept: OUTPATIENT SERVICES | Facility: HOSPITAL | Age: 73
LOS: 1 days | Discharge: HOME | End: 2023-10-30
Payer: MEDICARE

## 2023-10-30 DIAGNOSIS — Z98.890 OTHER SPECIFIED POSTPROCEDURAL STATES: Chronic | ICD-10-CM

## 2023-10-30 DIAGNOSIS — Z90.49 ACQUIRED ABSENCE OF OTHER SPECIFIED PARTS OF DIGESTIVE TRACT: Chronic | ICD-10-CM

## 2023-10-30 LAB
A1C WITH ESTIMATED AVERAGE GLUCOSE RESULT: 6.4 % — HIGH (ref 4–5.6)
A1C WITH ESTIMATED AVERAGE GLUCOSE RESULT: 6.4 % — HIGH (ref 4–5.6)
ALBUMIN SERPL ELPH-MCNC: 3.4 G/DL — SIGNIFICANT CHANGE UP (ref 3.3–5)
ALBUMIN SERPL ELPH-MCNC: 3.4 G/DL — SIGNIFICANT CHANGE UP (ref 3.3–5)
ALP SERPL-CCNC: 181 U/L — HIGH (ref 40–120)
ALP SERPL-CCNC: 181 U/L — HIGH (ref 40–120)
ALT FLD-CCNC: 61 U/L — HIGH (ref 10–45)
ALT FLD-CCNC: 61 U/L — HIGH (ref 10–45)
ANION GAP SERPL CALC-SCNC: 4 MMOL/L — LOW (ref 5–17)
ANION GAP SERPL CALC-SCNC: 4 MMOL/L — LOW (ref 5–17)
ANISOCYTOSIS BLD QL: SLIGHT — SIGNIFICANT CHANGE UP
ANISOCYTOSIS BLD QL: SLIGHT — SIGNIFICANT CHANGE UP
APTT BLD: 32.8 SEC — SIGNIFICANT CHANGE UP (ref 24.5–35.6)
APTT BLD: 32.8 SEC — SIGNIFICANT CHANGE UP (ref 24.5–35.6)
AST SERPL-CCNC: 57 U/L — HIGH (ref 10–40)
AST SERPL-CCNC: 57 U/L — HIGH (ref 10–40)
BASOPHILS # BLD AUTO: 0.02 K/UL — SIGNIFICANT CHANGE UP (ref 0–0.2)
BASOPHILS # BLD AUTO: 0.02 K/UL — SIGNIFICANT CHANGE UP (ref 0–0.2)
BASOPHILS NFR BLD AUTO: 0.9 % — SIGNIFICANT CHANGE UP (ref 0–2)
BASOPHILS NFR BLD AUTO: 0.9 % — SIGNIFICANT CHANGE UP (ref 0–2)
BILIRUB SERPL-MCNC: 1.2 MG/DL — SIGNIFICANT CHANGE UP (ref 0.2–1.2)
BILIRUB SERPL-MCNC: 1.2 MG/DL — SIGNIFICANT CHANGE UP (ref 0.2–1.2)
BUN SERPL-MCNC: 15 MG/DL — SIGNIFICANT CHANGE UP (ref 7–23)
BUN SERPL-MCNC: 15 MG/DL — SIGNIFICANT CHANGE UP (ref 7–23)
CALCIUM SERPL-MCNC: 8.6 MG/DL — SIGNIFICANT CHANGE UP (ref 8.4–10.5)
CALCIUM SERPL-MCNC: 8.6 MG/DL — SIGNIFICANT CHANGE UP (ref 8.4–10.5)
CHLORIDE SERPL-SCNC: 108 MMOL/L — SIGNIFICANT CHANGE UP (ref 96–108)
CHLORIDE SERPL-SCNC: 108 MMOL/L — SIGNIFICANT CHANGE UP (ref 96–108)
CHOLEST SERPL-MCNC: 146 MG/DL — SIGNIFICANT CHANGE UP
CHOLEST SERPL-MCNC: 146 MG/DL — SIGNIFICANT CHANGE UP
CK MB CFR SERPL CALC: 2 NG/ML — SIGNIFICANT CHANGE UP (ref 0–6.7)
CK MB CFR SERPL CALC: 2 NG/ML — SIGNIFICANT CHANGE UP (ref 0–6.7)
CK SERPL-CCNC: 74 U/L — SIGNIFICANT CHANGE UP (ref 30–200)
CK SERPL-CCNC: 74 U/L — SIGNIFICANT CHANGE UP (ref 30–200)
CO2 SERPL-SCNC: 29 MMOL/L — SIGNIFICANT CHANGE UP (ref 22–31)
CO2 SERPL-SCNC: 29 MMOL/L — SIGNIFICANT CHANGE UP (ref 22–31)
CREAT SERPL-MCNC: 0.48 MG/DL — LOW (ref 0.5–1.3)
CREAT SERPL-MCNC: 0.48 MG/DL — LOW (ref 0.5–1.3)
DACRYOCYTES BLD QL SMEAR: SLIGHT — SIGNIFICANT CHANGE UP
DACRYOCYTES BLD QL SMEAR: SLIGHT — SIGNIFICANT CHANGE UP
EGFR: 110 ML/MIN/1.73M2 — SIGNIFICANT CHANGE UP
EGFR: 110 ML/MIN/1.73M2 — SIGNIFICANT CHANGE UP
EOSINOPHIL # BLD AUTO: 0 K/UL — SIGNIFICANT CHANGE UP (ref 0–0.5)
EOSINOPHIL # BLD AUTO: 0 K/UL — SIGNIFICANT CHANGE UP (ref 0–0.5)
EOSINOPHIL NFR BLD AUTO: 0 % — SIGNIFICANT CHANGE UP (ref 0–6)
EOSINOPHIL NFR BLD AUTO: 0 % — SIGNIFICANT CHANGE UP (ref 0–6)
ESTIMATED AVERAGE GLUCOSE: 137 MG/DL — HIGH (ref 68–114)
ESTIMATED AVERAGE GLUCOSE: 137 MG/DL — HIGH (ref 68–114)
GIANT PLATELETS BLD QL SMEAR: PRESENT — SIGNIFICANT CHANGE UP
GIANT PLATELETS BLD QL SMEAR: PRESENT — SIGNIFICANT CHANGE UP
GLUCOSE BLDC GLUCOMTR-MCNC: 117 MG/DL — HIGH (ref 70–99)
GLUCOSE BLDC GLUCOMTR-MCNC: 117 MG/DL — HIGH (ref 70–99)
GLUCOSE BLDC GLUCOMTR-MCNC: 183 MG/DL — HIGH (ref 70–99)
GLUCOSE BLDC GLUCOMTR-MCNC: 183 MG/DL — HIGH (ref 70–99)
GLUCOSE SERPL-MCNC: 210 MG/DL — HIGH (ref 70–99)
GLUCOSE SERPL-MCNC: 210 MG/DL — HIGH (ref 70–99)
HCT VFR BLD CALC: 35.9 % — LOW (ref 39–50)
HCT VFR BLD CALC: 35.9 % — LOW (ref 39–50)
HDLC SERPL-MCNC: 61 MG/DL — SIGNIFICANT CHANGE UP
HDLC SERPL-MCNC: 61 MG/DL — SIGNIFICANT CHANGE UP
HGB BLD-MCNC: 12.1 G/DL — LOW (ref 13–17)
HGB BLD-MCNC: 12.1 G/DL — LOW (ref 13–17)
INR BLD: 1.14 — SIGNIFICANT CHANGE UP (ref 0.85–1.18)
INR BLD: 1.14 — SIGNIFICANT CHANGE UP (ref 0.85–1.18)
LIPID PNL WITH DIRECT LDL SERPL: 62 MG/DL — SIGNIFICANT CHANGE UP
LIPID PNL WITH DIRECT LDL SERPL: 62 MG/DL — SIGNIFICANT CHANGE UP
LYMPHOCYTES # BLD AUTO: 0.41 K/UL — LOW (ref 1–3.3)
LYMPHOCYTES # BLD AUTO: 0.41 K/UL — LOW (ref 1–3.3)
LYMPHOCYTES # BLD AUTO: 16.6 % — SIGNIFICANT CHANGE UP (ref 13–44)
LYMPHOCYTES # BLD AUTO: 16.6 % — SIGNIFICANT CHANGE UP (ref 13–44)
MACROCYTES BLD QL: SLIGHT — SIGNIFICANT CHANGE UP
MACROCYTES BLD QL: SLIGHT — SIGNIFICANT CHANGE UP
MAGNESIUM SERPL-MCNC: 2.2 MG/DL — SIGNIFICANT CHANGE UP (ref 1.6–2.6)
MAGNESIUM SERPL-MCNC: 2.2 MG/DL — SIGNIFICANT CHANGE UP (ref 1.6–2.6)
MANUAL SMEAR VERIFICATION: SIGNIFICANT CHANGE UP
MANUAL SMEAR VERIFICATION: SIGNIFICANT CHANGE UP
MCHC RBC-ENTMCNC: 29.8 PG — SIGNIFICANT CHANGE UP (ref 27–34)
MCHC RBC-ENTMCNC: 29.8 PG — SIGNIFICANT CHANGE UP (ref 27–34)
MCHC RBC-ENTMCNC: 33.7 GM/DL — SIGNIFICANT CHANGE UP (ref 32–36)
MCHC RBC-ENTMCNC: 33.7 GM/DL — SIGNIFICANT CHANGE UP (ref 32–36)
MCV RBC AUTO: 88.4 FL — SIGNIFICANT CHANGE UP (ref 80–100)
MCV RBC AUTO: 88.4 FL — SIGNIFICANT CHANGE UP (ref 80–100)
MICROCYTES BLD QL: SLIGHT — SIGNIFICANT CHANGE UP
MICROCYTES BLD QL: SLIGHT — SIGNIFICANT CHANGE UP
MONOCYTES # BLD AUTO: 0.11 K/UL — SIGNIFICANT CHANGE UP (ref 0–0.9)
MONOCYTES # BLD AUTO: 0.11 K/UL — SIGNIFICANT CHANGE UP (ref 0–0.9)
MONOCYTES NFR BLD AUTO: 4.4 % — SIGNIFICANT CHANGE UP (ref 2–14)
MONOCYTES NFR BLD AUTO: 4.4 % — SIGNIFICANT CHANGE UP (ref 2–14)
NEUTROPHILS # BLD AUTO: 1.94 K/UL — SIGNIFICANT CHANGE UP (ref 1.8–7.4)
NEUTROPHILS # BLD AUTO: 1.94 K/UL — SIGNIFICANT CHANGE UP (ref 1.8–7.4)
NEUTROPHILS NFR BLD AUTO: 78.1 % — HIGH (ref 43–77)
NEUTROPHILS NFR BLD AUTO: 78.1 % — HIGH (ref 43–77)
NON HDL CHOLESTEROL: 85 MG/DL — SIGNIFICANT CHANGE UP
NON HDL CHOLESTEROL: 85 MG/DL — SIGNIFICANT CHANGE UP
OVALOCYTES BLD QL SMEAR: SLIGHT — SIGNIFICANT CHANGE UP
OVALOCYTES BLD QL SMEAR: SLIGHT — SIGNIFICANT CHANGE UP
PLAT MORPH BLD: ABNORMAL
PLAT MORPH BLD: ABNORMAL
PLATELET # BLD AUTO: 32 K/UL — LOW (ref 150–400)
PLATELET # BLD AUTO: 32 K/UL — LOW (ref 150–400)
POIKILOCYTOSIS BLD QL AUTO: SLIGHT — SIGNIFICANT CHANGE UP
POIKILOCYTOSIS BLD QL AUTO: SLIGHT — SIGNIFICANT CHANGE UP
POTASSIUM SERPL-MCNC: 3.8 MMOL/L — SIGNIFICANT CHANGE UP (ref 3.5–5.3)
POTASSIUM SERPL-MCNC: 3.8 MMOL/L — SIGNIFICANT CHANGE UP (ref 3.5–5.3)
POTASSIUM SERPL-SCNC: 3.8 MMOL/L — SIGNIFICANT CHANGE UP (ref 3.5–5.3)
POTASSIUM SERPL-SCNC: 3.8 MMOL/L — SIGNIFICANT CHANGE UP (ref 3.5–5.3)
PROT SERPL-MCNC: 5.8 G/DL — LOW (ref 6–8.3)
PROT SERPL-MCNC: 5.8 G/DL — LOW (ref 6–8.3)
PROTHROM AB SERPL-ACNC: 12.9 SEC — SIGNIFICANT CHANGE UP (ref 9.5–13)
PROTHROM AB SERPL-ACNC: 12.9 SEC — SIGNIFICANT CHANGE UP (ref 9.5–13)
RBC # BLD: 4.06 M/UL — LOW (ref 4.2–5.8)
RBC # BLD: 4.06 M/UL — LOW (ref 4.2–5.8)
RBC # FLD: 16.1 % — HIGH (ref 10.3–14.5)
RBC # FLD: 16.1 % — HIGH (ref 10.3–14.5)
RBC BLD AUTO: ABNORMAL
RBC BLD AUTO: ABNORMAL
SODIUM SERPL-SCNC: 141 MMOL/L — SIGNIFICANT CHANGE UP (ref 135–145)
SODIUM SERPL-SCNC: 141 MMOL/L — SIGNIFICANT CHANGE UP (ref 135–145)
TRIGL SERPL-MCNC: 116 MG/DL — SIGNIFICANT CHANGE UP
TRIGL SERPL-MCNC: 116 MG/DL — SIGNIFICANT CHANGE UP
WBC # BLD: 2.49 K/UL — LOW (ref 3.8–10.5)
WBC # BLD: 2.49 K/UL — LOW (ref 3.8–10.5)
WBC # FLD AUTO: 2.49 K/UL — LOW (ref 3.8–10.5)
WBC # FLD AUTO: 2.49 K/UL — LOW (ref 3.8–10.5)

## 2023-10-30 PROCEDURE — 80053 COMPREHEN METABOLIC PANEL: CPT

## 2023-10-30 PROCEDURE — 85610 PROTHROMBIN TIME: CPT

## 2023-10-30 PROCEDURE — 99153 MOD SED SAME PHYS/QHP EA: CPT

## 2023-10-30 PROCEDURE — C1894: CPT

## 2023-10-30 PROCEDURE — C1769: CPT

## 2023-10-30 PROCEDURE — 80061 LIPID PANEL: CPT

## 2023-10-30 PROCEDURE — 83735 ASSAY OF MAGNESIUM: CPT

## 2023-10-30 PROCEDURE — 82962 GLUCOSE BLOOD TEST: CPT

## 2023-10-30 PROCEDURE — 85730 THROMBOPLASTIN TIME PARTIAL: CPT

## 2023-10-30 PROCEDURE — 99152 MOD SED SAME PHYS/QHP 5/>YRS: CPT

## 2023-10-30 PROCEDURE — 82553 CREATINE MB FRACTION: CPT

## 2023-10-30 PROCEDURE — 93458 L HRT ARTERY/VENTRICLE ANGIO: CPT | Mod: 26

## 2023-10-30 PROCEDURE — 93010 ELECTROCARDIOGRAM REPORT: CPT

## 2023-10-30 PROCEDURE — 83036 HEMOGLOBIN GLYCOSYLATED A1C: CPT

## 2023-10-30 PROCEDURE — 93458 L HRT ARTERY/VENTRICLE ANGIO: CPT

## 2023-10-30 PROCEDURE — 82550 ASSAY OF CK (CPK): CPT

## 2023-10-30 PROCEDURE — 93005 ELECTROCARDIOGRAM TRACING: CPT

## 2023-10-30 PROCEDURE — C1887: CPT

## 2023-10-30 PROCEDURE — 85025 COMPLETE CBC W/AUTO DIFF WBC: CPT

## 2023-10-30 RX ORDER — METFORMIN HYDROCHLORIDE 850 MG/1
1 TABLET ORAL
Qty: 0 | Refills: 0 | DISCHARGE

## 2023-10-30 RX ORDER — ASPIRIN/CALCIUM CARB/MAGNESIUM 324 MG
81 TABLET ORAL ONCE
Refills: 0 | Status: COMPLETED | OUTPATIENT
Start: 2023-10-30 | End: 2023-10-30

## 2023-10-30 RX ORDER — APIXABAN 2.5 MG/1
1 TABLET, FILM COATED ORAL
Qty: 0 | Refills: 0 | DISCHARGE

## 2023-10-30 RX ORDER — SEMAGLUTIDE 0.68 MG/ML
0 INJECTION, SOLUTION SUBCUTANEOUS
Qty: 0 | Refills: 0 | DISCHARGE

## 2023-10-30 RX ORDER — ASPIRIN/CALCIUM CARB/MAGNESIUM 324 MG
325 TABLET ORAL ONCE
Refills: 0 | Status: DISCONTINUED | OUTPATIENT
Start: 2023-10-30 | End: 2023-10-30

## 2023-10-30 RX ORDER — SERTRALINE 25 MG/1
1 TABLET, FILM COATED ORAL
Qty: 0 | Refills: 0 | DISCHARGE

## 2023-10-30 RX ORDER — SODIUM CHLORIDE 9 MG/ML
500 INJECTION INTRAMUSCULAR; INTRAVENOUS; SUBCUTANEOUS
Refills: 0 | Status: DISCONTINUED | OUTPATIENT
Start: 2023-10-30 | End: 2023-10-30

## 2023-10-30 RX ORDER — POTASSIUM CHLORIDE 20 MEQ
20 PACKET (EA) ORAL ONCE
Refills: 0 | Status: COMPLETED | OUTPATIENT
Start: 2023-10-30 | End: 2023-10-30

## 2023-10-30 RX ORDER — RIFAXIMIN 200 MG/1
1 TABLET ORAL
Refills: 0 | DISCHARGE

## 2023-10-30 RX ORDER — SODIUM CHLORIDE 9 MG/ML
250 INJECTION INTRAMUSCULAR; INTRAVENOUS; SUBCUTANEOUS ONCE
Refills: 0 | Status: COMPLETED | OUTPATIENT
Start: 2023-10-30 | End: 2023-10-30

## 2023-10-30 RX ORDER — METOPROLOL TARTRATE 50 MG
1 TABLET ORAL
Qty: 0 | Refills: 0 | DISCHARGE

## 2023-10-30 RX ORDER — CLOPIDOGREL BISULFATE 75 MG/1
600 TABLET, FILM COATED ORAL ONCE
Refills: 0 | Status: DISCONTINUED | OUTPATIENT
Start: 2023-10-30 | End: 2023-10-30

## 2023-10-30 RX ADMIN — Medication 81 MILLIGRAM(S): at 11:07

## 2023-10-30 RX ADMIN — SODIUM CHLORIDE 1000 MILLILITER(S): 9 INJECTION INTRAMUSCULAR; INTRAVENOUS; SUBCUTANEOUS at 09:00

## 2023-10-30 RX ADMIN — SODIUM CHLORIDE 75 MILLILITER(S): 9 INJECTION INTRAMUSCULAR; INTRAVENOUS; SUBCUTANEOUS at 09:00

## 2023-10-30 RX ADMIN — Medication 20 MILLIEQUIVALENT(S): at 11:11

## 2023-10-30 NOTE — PROGRESS NOTE ADULT - SUBJECTIVE AND OBJECTIVE BOX
Interventional Cardiology PA SDA Discharge Note    Patient without complaints. Ambulated and voided without difficulties    Afebrile, VSS    Ext:    		Right Radial : no   hematoma, no   bleeding, dressing; C/D/I      Pulses:    intact RAD 2+     A/P:  73yo M, retired urologist, PMHx recently diagnosed hepatocellular carcinoma (s/o IR-guided radioembolization), portal HTN (on nadolol), thrombocytopenia (PLT 32k 10/30/2023), nonobstructive CAD (2019), HTN, HLD, pAfib (not on AC 2/2 throbocytopenia), depression/anxiety presented to cards c/o occasional AMBROSE. Of note patient undergoing liver transplant work-up.. In light of patient's risk factors and abnormal NST results, patient is referred to [facility] for cardiac catheterization w/ possible intervention if clinically indicated as part of workup for liver transplant    s/p cardiac cath via RRA ( no heparin was given):  2vCAD : dLcx 70% and mRCA 70%, nl LM and normal LAD  for medical management        1.	Stable for discharge as per attending Dr. Villareal  after bed rest, wrist stable and 30 minutes of ambulation.  2.	Follow-up with PMD/Cardiologist __Dr Erlin Davison  in 1-2 weeks  3.	Discharged forms signed and copies in chart

## 2023-11-03 DIAGNOSIS — I25.10 ATHEROSCLEROTIC HEART DISEASE OF NATIVE CORONARY ARTERY WITHOUT ANGINA PECTORIS: ICD-10-CM

## 2023-11-03 DIAGNOSIS — Z86.79 PERSONAL HISTORY OF OTHER DISEASES OF THE CIRCULATORY SYSTEM: ICD-10-CM

## 2023-11-03 DIAGNOSIS — I25.84 CORONARY ATHEROSCLEROSIS DUE TO CALCIFIED CORONARY LESION: ICD-10-CM

## 2023-12-07 ENCOUNTER — APPOINTMENT (OUTPATIENT)
Dept: MRI IMAGING | Facility: HOSPITAL | Age: 73
End: 2023-12-07

## 2024-01-01 ENCOUNTER — INPATIENT (INPATIENT)
Facility: HOSPITAL | Age: 74
LOS: 70 days | DRG: 443 | End: 2025-01-24
Attending: TRANSPLANT SURGERY | Admitting: TRANSPLANT SURGERY
Payer: MEDICARE

## 2024-01-01 VITALS
HEIGHT: 72 IN | HEART RATE: 79 BPM | OXYGEN SATURATION: 98 % | WEIGHT: 206.35 LBS | DIASTOLIC BLOOD PRESSURE: 73 MMHG | SYSTOLIC BLOOD PRESSURE: 145 MMHG | TEMPERATURE: 98 F | RESPIRATION RATE: 17 BRPM

## 2024-01-01 DIAGNOSIS — Z98.890 OTHER SPECIFIED POSTPROCEDURAL STATES: Chronic | ICD-10-CM

## 2024-01-01 DIAGNOSIS — K56.7 ILEUS, UNSPECIFIED: ICD-10-CM

## 2024-01-01 DIAGNOSIS — Z94.4 LIVER TRANSPLANT STATUS: ICD-10-CM

## 2024-01-01 DIAGNOSIS — R57.0 CARDIOGENIC SHOCK: ICD-10-CM

## 2024-01-01 DIAGNOSIS — K74.60 UNSPECIFIED CIRRHOSIS OF LIVER: ICD-10-CM

## 2024-01-01 DIAGNOSIS — N17.9 ACUTE KIDNEY FAILURE, UNSPECIFIED: ICD-10-CM

## 2024-01-01 DIAGNOSIS — A41.9 SEPSIS, UNSPECIFIED ORGANISM: ICD-10-CM

## 2024-01-01 DIAGNOSIS — Z90.49 ACQUIRED ABSENCE OF OTHER SPECIFIED PARTS OF DIGESTIVE TRACT: Chronic | ICD-10-CM

## 2024-01-01 DIAGNOSIS — K56.609 UNSPECIFIED INTESTINAL OBSTRUCTION, UNSPECIFIED AS TO PARTIAL VERSUS COMPLETE OBSTRUCTION: ICD-10-CM

## 2024-01-01 LAB
-  AMPHOTERICIN B: SIGNIFICANT CHANGE UP
-  AMPICILLIN/SULBACTAM: SIGNIFICANT CHANGE UP
-  AMPICILLIN: SIGNIFICANT CHANGE UP
-  ANIDULAFUNGIN: SIGNIFICANT CHANGE UP
-  AZTREONAM: SIGNIFICANT CHANGE UP
-  BLOOD PCR PANEL: SIGNIFICANT CHANGE UP
-  CASPOFUNGIN: SIGNIFICANT CHANGE UP
-  CEFAZOLIN: SIGNIFICANT CHANGE UP
-  CEFEPIME: SIGNIFICANT CHANGE UP
-  CEFOXITIN: SIGNIFICANT CHANGE UP
-  CEFTRIAXONE: SIGNIFICANT CHANGE UP
-  CIPROFLOXACIN: SIGNIFICANT CHANGE UP
-  CIPROFLOXACIN: SIGNIFICANT CHANGE UP
-  ERTAPENEM: SIGNIFICANT CHANGE UP
-  FLUCONAZOLE: SIGNIFICANT CHANGE UP
-  GENTAMICIN SYNERGY: SIGNIFICANT CHANGE UP
-  GENTAMICIN SYNERGY: SIGNIFICANT CHANGE UP
-  GENTAMICIN: SIGNIFICANT CHANGE UP
-  IMIPENEM: SIGNIFICANT CHANGE UP
-  LEVOFLOXACIN: SIGNIFICANT CHANGE UP
-  MEROPENEM: SIGNIFICANT CHANGE UP
-  MICAFUNGIN: SIGNIFICANT CHANGE UP
-  MINOCYCLINE: SIGNIFICANT CHANGE UP
-  NITROFURANTOIN: SIGNIFICANT CHANGE UP
-  PIPERACILLIN/TAZOBACTAM: SIGNIFICANT CHANGE UP
-  POSACONAZOLE: SIGNIFICANT CHANGE UP
-  STREPTOMYCIN SYNERGY: SIGNIFICANT CHANGE UP
-  STREPTOMYCIN SYNERGY: SIGNIFICANT CHANGE UP
-  TETRACYCLINE: SIGNIFICANT CHANGE UP
-  TOBRAMYCIN: SIGNIFICANT CHANGE UP
-  TRIMETHOPRIM/SULFAMETHOXAZOLE: SIGNIFICANT CHANGE UP
-  TRIMETHOPRIM/SULFAMETHOXAZOLE: SIGNIFICANT CHANGE UP
-  VANCOMYCIN: SIGNIFICANT CHANGE UP
-  VORICONAZOLE: SIGNIFICANT CHANGE UP
24R-OH-CALCIDIOL SERPL-MCNC: <6 NG/ML — LOW (ref 30–80)
50/50 COAG PANEL: SIGNIFICANT CHANGE UP
50/50 COAG PANEL: SIGNIFICANT CHANGE UP
A PHAGOCYTOPH DNA BLD QL NAA+PROBE: NEGATIVE — SIGNIFICANT CHANGE UP
ACANTHOCYTES BLD QL SMEAR: SLIGHT — SIGNIFICANT CHANGE UP
ADD ON TEST-SPECIMEN IN LAB: SIGNIFICANT CHANGE UP
ALBUMIN SERPL ELPH-MCNC: 1.9 G/DL — LOW (ref 3.3–5)
ALBUMIN SERPL ELPH-MCNC: 2 G/DL — LOW (ref 3.3–5)
ALBUMIN SERPL ELPH-MCNC: 2.1 G/DL — LOW (ref 3.3–5)
ALBUMIN SERPL ELPH-MCNC: 2.2 G/DL — LOW (ref 3.3–5)
ALBUMIN SERPL ELPH-MCNC: 2.3 G/DL — LOW (ref 3.3–5)
ALBUMIN SERPL ELPH-MCNC: 2.4 G/DL — LOW (ref 3.3–5)
ALBUMIN SERPL ELPH-MCNC: 2.5 G/DL — LOW (ref 3.3–5)
ALBUMIN SERPL ELPH-MCNC: 2.6 G/DL — LOW (ref 3.3–5)
ALBUMIN SERPL ELPH-MCNC: 2.7 G/DL — LOW (ref 3.3–5)
ALBUMIN SERPL ELPH-MCNC: 2.8 G/DL — LOW (ref 3.3–5)
ALBUMIN SERPL ELPH-MCNC: 2.9 G/DL — LOW (ref 3.3–5)
ALBUMIN SERPL ELPH-MCNC: 3 G/DL — LOW (ref 3.3–5)
ALBUMIN SERPL ELPH-MCNC: 3.1 G/DL — LOW (ref 3.3–5)
ALBUMIN SERPL ELPH-MCNC: 3.2 G/DL — LOW (ref 3.3–5)
ALBUMIN SERPL ELPH-MCNC: 3.3 G/DL — SIGNIFICANT CHANGE UP (ref 3.3–5)
ALBUMIN SERPL ELPH-MCNC: 3.4 G/DL — SIGNIFICANT CHANGE UP (ref 3.3–5)
ALBUMIN SERPL ELPH-MCNC: 3.5 G/DL — SIGNIFICANT CHANGE UP (ref 3.3–5)
ALBUMIN SERPL ELPH-MCNC: 3.5 G/DL — SIGNIFICANT CHANGE UP (ref 3.3–5)
ALBUMIN SERPL ELPH-MCNC: 3.6 G/DL — SIGNIFICANT CHANGE UP (ref 3.3–5)
ALP SERPL-CCNC: 100 U/L — SIGNIFICANT CHANGE UP (ref 40–120)
ALP SERPL-CCNC: 100 U/L — SIGNIFICANT CHANGE UP (ref 40–120)
ALP SERPL-CCNC: 102 U/L — SIGNIFICANT CHANGE UP (ref 40–120)
ALP SERPL-CCNC: 103 U/L — SIGNIFICANT CHANGE UP (ref 40–120)
ALP SERPL-CCNC: 107 U/L — SIGNIFICANT CHANGE UP (ref 40–120)
ALP SERPL-CCNC: 109 U/L — SIGNIFICANT CHANGE UP (ref 40–120)
ALP SERPL-CCNC: 109 U/L — SIGNIFICANT CHANGE UP (ref 40–120)
ALP SERPL-CCNC: 110 U/L — SIGNIFICANT CHANGE UP (ref 40–120)
ALP SERPL-CCNC: 114 U/L — SIGNIFICANT CHANGE UP (ref 40–120)
ALP SERPL-CCNC: 116 U/L — SIGNIFICANT CHANGE UP (ref 40–120)
ALP SERPL-CCNC: 117 U/L — SIGNIFICANT CHANGE UP (ref 40–120)
ALP SERPL-CCNC: 117 U/L — SIGNIFICANT CHANGE UP (ref 40–120)
ALP SERPL-CCNC: 121 U/L — HIGH (ref 40–120)
ALP SERPL-CCNC: 123 U/L — HIGH (ref 40–120)
ALP SERPL-CCNC: 124 U/L — HIGH (ref 40–120)
ALP SERPL-CCNC: 126 U/L — HIGH (ref 40–120)
ALP SERPL-CCNC: 126 U/L — HIGH (ref 40–120)
ALP SERPL-CCNC: 127 U/L — HIGH (ref 40–120)
ALP SERPL-CCNC: 128 U/L — HIGH (ref 40–120)
ALP SERPL-CCNC: 128 U/L — HIGH (ref 40–120)
ALP SERPL-CCNC: 129 U/L — HIGH (ref 40–120)
ALP SERPL-CCNC: 130 U/L — HIGH (ref 40–120)
ALP SERPL-CCNC: 131 U/L — HIGH (ref 40–120)
ALP SERPL-CCNC: 133 U/L — HIGH (ref 40–120)
ALP SERPL-CCNC: 134 U/L — HIGH (ref 40–120)
ALP SERPL-CCNC: 135 U/L — HIGH (ref 40–120)
ALP SERPL-CCNC: 136 U/L — HIGH (ref 40–120)
ALP SERPL-CCNC: 136 U/L — HIGH (ref 40–120)
ALP SERPL-CCNC: 137 U/L — HIGH (ref 40–120)
ALP SERPL-CCNC: 138 U/L — HIGH (ref 40–120)
ALP SERPL-CCNC: 139 U/L — HIGH (ref 40–120)
ALP SERPL-CCNC: 140 U/L — HIGH (ref 40–120)
ALP SERPL-CCNC: 140 U/L — HIGH (ref 40–120)
ALP SERPL-CCNC: 141 U/L — HIGH (ref 40–120)
ALP SERPL-CCNC: 142 U/L — HIGH (ref 40–120)
ALP SERPL-CCNC: 143 U/L — HIGH (ref 40–120)
ALP SERPL-CCNC: 144 U/L — HIGH (ref 40–120)
ALP SERPL-CCNC: 144 U/L — HIGH (ref 40–120)
ALP SERPL-CCNC: 145 U/L — HIGH (ref 40–120)
ALP SERPL-CCNC: 146 U/L — HIGH (ref 40–120)
ALP SERPL-CCNC: 147 U/L — HIGH (ref 40–120)
ALP SERPL-CCNC: 147 U/L — HIGH (ref 40–120)
ALP SERPL-CCNC: 148 U/L — HIGH (ref 40–120)
ALP SERPL-CCNC: 149 U/L — HIGH (ref 40–120)
ALP SERPL-CCNC: 150 U/L — HIGH (ref 40–120)
ALP SERPL-CCNC: 151 U/L — HIGH (ref 40–120)
ALP SERPL-CCNC: 151 U/L — HIGH (ref 40–120)
ALP SERPL-CCNC: 153 U/L — HIGH (ref 40–120)
ALP SERPL-CCNC: 154 U/L — HIGH (ref 40–120)
ALP SERPL-CCNC: 154 U/L — HIGH (ref 40–120)
ALP SERPL-CCNC: 157 U/L — HIGH (ref 40–120)
ALP SERPL-CCNC: 157 U/L — HIGH (ref 40–120)
ALP SERPL-CCNC: 161 U/L — HIGH (ref 40–120)
ALP SERPL-CCNC: 161 U/L — HIGH (ref 40–120)
ALP SERPL-CCNC: 164 U/L — HIGH (ref 40–120)
ALP SERPL-CCNC: 165 U/L — HIGH (ref 40–120)
ALP SERPL-CCNC: 169 U/L — HIGH (ref 40–120)
ALP SERPL-CCNC: 169 U/L — HIGH (ref 40–120)
ALP SERPL-CCNC: 173 U/L — HIGH (ref 40–120)
ALP SERPL-CCNC: 174 U/L — HIGH (ref 40–120)
ALP SERPL-CCNC: 177 U/L — HIGH (ref 40–120)
ALP SERPL-CCNC: 177 U/L — HIGH (ref 40–120)
ALP SERPL-CCNC: 179 U/L — HIGH (ref 40–120)
ALP SERPL-CCNC: 180 U/L — HIGH (ref 40–120)
ALP SERPL-CCNC: 180 U/L — HIGH (ref 40–120)
ALP SERPL-CCNC: 182 U/L — HIGH (ref 40–120)
ALP SERPL-CCNC: 183 U/L — HIGH (ref 40–120)
ALP SERPL-CCNC: 186 U/L — HIGH (ref 40–120)
ALP SERPL-CCNC: 194 U/L — HIGH (ref 40–120)
ALP SERPL-CCNC: 194 U/L — HIGH (ref 40–120)
ALP SERPL-CCNC: 197 U/L — HIGH (ref 40–120)
ALP SERPL-CCNC: 200 U/L — HIGH (ref 40–120)
ALP SERPL-CCNC: 204 U/L — HIGH (ref 40–120)
ALP SERPL-CCNC: 205 U/L — HIGH (ref 40–120)
ALP SERPL-CCNC: 206 U/L — HIGH (ref 40–120)
ALP SERPL-CCNC: 212 U/L — HIGH (ref 40–120)
ALP SERPL-CCNC: 215 U/L — HIGH (ref 40–120)
ALP SERPL-CCNC: 220 U/L — HIGH (ref 40–120)
ALP SERPL-CCNC: 224 U/L — HIGH (ref 40–120)
ALP SERPL-CCNC: 229 U/L — HIGH (ref 40–120)
ALP SERPL-CCNC: 233 U/L — HIGH (ref 40–120)
ALP SERPL-CCNC: 242 U/L — HIGH (ref 40–120)
ALP SERPL-CCNC: 243 U/L — HIGH (ref 40–120)
ALP SERPL-CCNC: 244 U/L — HIGH (ref 40–120)
ALP SERPL-CCNC: 253 U/L — HIGH (ref 40–120)
ALP SERPL-CCNC: 258 U/L — HIGH (ref 40–120)
ALP SERPL-CCNC: 295 U/L — HIGH (ref 40–120)
ALP SERPL-CCNC: 59 U/L — SIGNIFICANT CHANGE UP (ref 40–120)
ALP SERPL-CCNC: 62 U/L — SIGNIFICANT CHANGE UP (ref 40–120)
ALP SERPL-CCNC: 63 U/L — SIGNIFICANT CHANGE UP (ref 40–120)
ALP SERPL-CCNC: 64 U/L — SIGNIFICANT CHANGE UP (ref 40–120)
ALP SERPL-CCNC: 65 U/L — SIGNIFICANT CHANGE UP (ref 40–120)
ALP SERPL-CCNC: 66 U/L — SIGNIFICANT CHANGE UP (ref 40–120)
ALP SERPL-CCNC: 67 U/L — SIGNIFICANT CHANGE UP (ref 40–120)
ALP SERPL-CCNC: 68 U/L — SIGNIFICANT CHANGE UP (ref 40–120)
ALP SERPL-CCNC: 69 U/L — SIGNIFICANT CHANGE UP (ref 40–120)
ALP SERPL-CCNC: 70 U/L — SIGNIFICANT CHANGE UP (ref 40–120)
ALP SERPL-CCNC: 72 U/L — SIGNIFICANT CHANGE UP (ref 40–120)
ALP SERPL-CCNC: 73 U/L — SIGNIFICANT CHANGE UP (ref 40–120)
ALP SERPL-CCNC: 73 U/L — SIGNIFICANT CHANGE UP (ref 40–120)
ALP SERPL-CCNC: 74 U/L — SIGNIFICANT CHANGE UP (ref 40–120)
ALP SERPL-CCNC: 74 U/L — SIGNIFICANT CHANGE UP (ref 40–120)
ALP SERPL-CCNC: 75 U/L — SIGNIFICANT CHANGE UP (ref 40–120)
ALP SERPL-CCNC: 76 U/L — SIGNIFICANT CHANGE UP (ref 40–120)
ALP SERPL-CCNC: 76 U/L — SIGNIFICANT CHANGE UP (ref 40–120)
ALP SERPL-CCNC: 77 U/L — SIGNIFICANT CHANGE UP (ref 40–120)
ALP SERPL-CCNC: 77 U/L — SIGNIFICANT CHANGE UP (ref 40–120)
ALP SERPL-CCNC: 78 U/L — SIGNIFICANT CHANGE UP (ref 40–120)
ALP SERPL-CCNC: 80 U/L — SIGNIFICANT CHANGE UP (ref 40–120)
ALP SERPL-CCNC: 84 U/L — SIGNIFICANT CHANGE UP (ref 40–120)
ALP SERPL-CCNC: 93 U/L — SIGNIFICANT CHANGE UP (ref 40–120)
ALP SERPL-CCNC: 93 U/L — SIGNIFICANT CHANGE UP (ref 40–120)
ALP SERPL-CCNC: 95 U/L — SIGNIFICANT CHANGE UP (ref 40–120)
ALT FLD-CCNC: 1009 U/L — HIGH (ref 10–45)
ALT FLD-CCNC: 102 U/L — HIGH (ref 10–45)
ALT FLD-CCNC: 103 U/L — HIGH (ref 10–45)
ALT FLD-CCNC: 115 U/L — HIGH (ref 10–45)
ALT FLD-CCNC: 1172 U/L — HIGH (ref 10–45)
ALT FLD-CCNC: 121 U/L — HIGH (ref 10–45)
ALT FLD-CCNC: 132 U/L — HIGH (ref 10–45)
ALT FLD-CCNC: 1394 U/L — HIGH (ref 10–45)
ALT FLD-CCNC: 157 U/L — HIGH (ref 10–45)
ALT FLD-CCNC: 167 U/L — HIGH (ref 10–45)
ALT FLD-CCNC: 175 U/L — HIGH (ref 10–45)
ALT FLD-CCNC: 183 U/L — HIGH (ref 10–45)
ALT FLD-CCNC: 187 U/L — HIGH (ref 10–45)
ALT FLD-CCNC: 189 U/L — HIGH (ref 10–45)
ALT FLD-CCNC: 190 U/L — HIGH (ref 10–45)
ALT FLD-CCNC: 192 U/L — HIGH (ref 10–45)
ALT FLD-CCNC: 1968 U/L — HIGH (ref 10–45)
ALT FLD-CCNC: 2000 U/L — HIGH (ref 10–45)
ALT FLD-CCNC: 205 U/L — HIGH (ref 10–45)
ALT FLD-CCNC: 205 U/L — HIGH (ref 10–45)
ALT FLD-CCNC: 206 U/L — HIGH (ref 10–45)
ALT FLD-CCNC: 21 U/L — SIGNIFICANT CHANGE UP (ref 10–45)
ALT FLD-CCNC: 210 U/L — HIGH (ref 10–45)
ALT FLD-CCNC: 211 U/L — HIGH (ref 10–45)
ALT FLD-CCNC: 213 U/L — HIGH (ref 10–45)
ALT FLD-CCNC: 214 U/L — HIGH (ref 10–45)
ALT FLD-CCNC: 221 U/L — HIGH (ref 10–45)
ALT FLD-CCNC: 231 U/L — HIGH (ref 10–45)
ALT FLD-CCNC: 232 U/L — HIGH (ref 10–45)
ALT FLD-CCNC: 237 U/L — HIGH (ref 10–45)
ALT FLD-CCNC: 242 U/L — HIGH (ref 10–45)
ALT FLD-CCNC: 249 U/L — HIGH (ref 10–45)
ALT FLD-CCNC: 256 U/L — HIGH (ref 10–45)
ALT FLD-CCNC: 26 U/L — SIGNIFICANT CHANGE UP (ref 10–45)
ALT FLD-CCNC: 261 U/L — HIGH (ref 10–45)
ALT FLD-CCNC: 264 U/L — HIGH (ref 10–45)
ALT FLD-CCNC: 27 U/L — SIGNIFICANT CHANGE UP (ref 10–45)
ALT FLD-CCNC: 27 U/L — SIGNIFICANT CHANGE UP (ref 10–45)
ALT FLD-CCNC: 274 U/L — HIGH (ref 10–45)
ALT FLD-CCNC: 28 U/L — SIGNIFICANT CHANGE UP (ref 10–45)
ALT FLD-CCNC: 284 U/L — HIGH (ref 10–45)
ALT FLD-CCNC: 286 U/L — HIGH (ref 10–45)
ALT FLD-CCNC: 29 U/L — SIGNIFICANT CHANGE UP (ref 10–45)
ALT FLD-CCNC: 29 U/L — SIGNIFICANT CHANGE UP (ref 10–45)
ALT FLD-CCNC: 2924 U/L — HIGH (ref 10–45)
ALT FLD-CCNC: 293 U/L — HIGH (ref 10–45)
ALT FLD-CCNC: 30 U/L — SIGNIFICANT CHANGE UP (ref 10–45)
ALT FLD-CCNC: 30 U/L — SIGNIFICANT CHANGE UP (ref 10–45)
ALT FLD-CCNC: 309 U/L — HIGH (ref 10–45)
ALT FLD-CCNC: 31 U/L — SIGNIFICANT CHANGE UP (ref 10–45)
ALT FLD-CCNC: 32 U/L — SIGNIFICANT CHANGE UP (ref 10–45)
ALT FLD-CCNC: 32 U/L — SIGNIFICANT CHANGE UP (ref 10–45)
ALT FLD-CCNC: 320 U/L — HIGH (ref 10–45)
ALT FLD-CCNC: 322 U/L — HIGH (ref 10–45)
ALT FLD-CCNC: 33 U/L — SIGNIFICANT CHANGE UP (ref 10–45)
ALT FLD-CCNC: 331 U/L — HIGH (ref 10–45)
ALT FLD-CCNC: 34 U/L — SIGNIFICANT CHANGE UP (ref 10–45)
ALT FLD-CCNC: 348 U/L — HIGH (ref 10–45)
ALT FLD-CCNC: 349 U/L — HIGH (ref 10–45)
ALT FLD-CCNC: 35 U/L — SIGNIFICANT CHANGE UP (ref 10–45)
ALT FLD-CCNC: 35 U/L — SIGNIFICANT CHANGE UP (ref 10–45)
ALT FLD-CCNC: 37 U/L — SIGNIFICANT CHANGE UP (ref 10–45)
ALT FLD-CCNC: 387 U/L — HIGH (ref 10–45)
ALT FLD-CCNC: 39 U/L — SIGNIFICANT CHANGE UP (ref 10–45)
ALT FLD-CCNC: 39 U/L — SIGNIFICANT CHANGE UP (ref 10–45)
ALT FLD-CCNC: 404 U/L — HIGH (ref 10–45)
ALT FLD-CCNC: 41 U/L — SIGNIFICANT CHANGE UP (ref 10–45)
ALT FLD-CCNC: 417 U/L — HIGH (ref 10–45)
ALT FLD-CCNC: 42 U/L — SIGNIFICANT CHANGE UP (ref 10–45)
ALT FLD-CCNC: 42 U/L — SIGNIFICANT CHANGE UP (ref 10–45)
ALT FLD-CCNC: 43 U/L — SIGNIFICANT CHANGE UP (ref 10–45)
ALT FLD-CCNC: 44 U/L — SIGNIFICANT CHANGE UP (ref 10–45)
ALT FLD-CCNC: 48 U/L — HIGH (ref 10–45)
ALT FLD-CCNC: 48 U/L — HIGH (ref 10–45)
ALT FLD-CCNC: 487 U/L — HIGH (ref 10–45)
ALT FLD-CCNC: 488 U/L — HIGH (ref 10–45)
ALT FLD-CCNC: 49 U/L — HIGH (ref 10–45)
ALT FLD-CCNC: 496 U/L — HIGH (ref 10–45)
ALT FLD-CCNC: 499 U/L — HIGH (ref 10–45)
ALT FLD-CCNC: 50 U/L — HIGH (ref 10–45)
ALT FLD-CCNC: 50 U/L — HIGH (ref 10–45)
ALT FLD-CCNC: 52 U/L — HIGH (ref 10–45)
ALT FLD-CCNC: 52 U/L — HIGH (ref 10–45)
ALT FLD-CCNC: 527 U/L — HIGH (ref 10–45)
ALT FLD-CCNC: 53 U/L — HIGH (ref 10–45)
ALT FLD-CCNC: 54 U/L — HIGH (ref 10–45)
ALT FLD-CCNC: 55 U/L — HIGH (ref 10–45)
ALT FLD-CCNC: 55 U/L — HIGH (ref 10–45)
ALT FLD-CCNC: 56 U/L — HIGH (ref 10–45)
ALT FLD-CCNC: 56 U/L — HIGH (ref 10–45)
ALT FLD-CCNC: 57 U/L — HIGH (ref 10–45)
ALT FLD-CCNC: 57 U/L — HIGH (ref 10–45)
ALT FLD-CCNC: 59 U/L — HIGH (ref 10–45)
ALT FLD-CCNC: 594 U/L — HIGH (ref 10–45)
ALT FLD-CCNC: 60 U/L — HIGH (ref 10–45)
ALT FLD-CCNC: 61 U/L — HIGH (ref 10–45)
ALT FLD-CCNC: 61 U/L — HIGH (ref 10–45)
ALT FLD-CCNC: 616 U/L — HIGH (ref 10–45)
ALT FLD-CCNC: 62 U/L — HIGH (ref 10–45)
ALT FLD-CCNC: 62 U/L — HIGH (ref 10–45)
ALT FLD-CCNC: 63 U/L — HIGH (ref 10–45)
ALT FLD-CCNC: 63 U/L — HIGH (ref 10–45)
ALT FLD-CCNC: 630 U/L — HIGH (ref 10–45)
ALT FLD-CCNC: 64 U/L — HIGH (ref 10–45)
ALT FLD-CCNC: 64 U/L — HIGH (ref 10–45)
ALT FLD-CCNC: 66 U/L — HIGH (ref 10–45)
ALT FLD-CCNC: 66 U/L — HIGH (ref 10–45)
ALT FLD-CCNC: 660 U/L — HIGH (ref 10–45)
ALT FLD-CCNC: 667 U/L — HIGH (ref 10–45)
ALT FLD-CCNC: 68 U/L — HIGH (ref 10–45)
ALT FLD-CCNC: 69 U/L — HIGH (ref 10–45)
ALT FLD-CCNC: 701 U/L — HIGH (ref 10–45)
ALT FLD-CCNC: 72 U/L — HIGH (ref 10–45)
ALT FLD-CCNC: 755 U/L — HIGH (ref 10–45)
ALT FLD-CCNC: 76 U/L — HIGH (ref 10–45)
ALT FLD-CCNC: 76 U/L — HIGH (ref 10–45)
ALT FLD-CCNC: 760 U/L — HIGH (ref 10–45)
ALT FLD-CCNC: 77 U/L — HIGH (ref 10–45)
ALT FLD-CCNC: 79 U/L — HIGH (ref 10–45)
ALT FLD-CCNC: 80 U/L — HIGH (ref 10–45)
ALT FLD-CCNC: 83 U/L — HIGH (ref 10–45)
ALT FLD-CCNC: 84 U/L — HIGH (ref 10–45)
ALT FLD-CCNC: 84 U/L — HIGH (ref 10–45)
ALT FLD-CCNC: 85 U/L — HIGH (ref 10–45)
ALT FLD-CCNC: 86 U/L — HIGH (ref 10–45)
ALT FLD-CCNC: 86 U/L — HIGH (ref 10–45)
ALT FLD-CCNC: 885 U/L — HIGH (ref 10–45)
ALT FLD-CCNC: 89 U/L — HIGH (ref 10–45)
ALT FLD-CCNC: 926 U/L — HIGH (ref 10–45)
ALT FLD-CCNC: 927 U/L — HIGH (ref 10–45)
ALT FLD-CCNC: 938 U/L — HIGH (ref 10–45)
ALT FLD-CCNC: 95 U/L — HIGH (ref 10–45)
ALT FLD-CCNC: 97 U/L — HIGH (ref 10–45)
ALT FLD-CCNC: 98 U/L — HIGH (ref 10–45)
AMMONIA BLD-MCNC: 21 UMOL/L — SIGNIFICANT CHANGE UP (ref 11–55)
AMMONIA BLD-MCNC: 23 UMOL/L — SIGNIFICANT CHANGE UP (ref 11–55)
AMMONIA BLD-MCNC: 26 UMOL/L — SIGNIFICANT CHANGE UP (ref 11–55)
AMMONIA BLD-MCNC: 52 UMOL/L — SIGNIFICANT CHANGE UP (ref 11–55)
AMMONIA BLD-MCNC: 54 UMOL/L — SIGNIFICANT CHANGE UP (ref 11–55)
AMMONIA BLD-MCNC: 61 UMOL/L — HIGH (ref 11–55)
AMMONIA BLD-MCNC: <10 UMOL/L — LOW (ref 11–55)
AMYLASE P1 CFR SERPL: 133 U/L — HIGH (ref 25–125)
AMYLASE P1 CFR SERPL: 6 U/L — LOW (ref 25–125)
ANION GAP SERPL CALC-SCNC: 10 MMOL/L — SIGNIFICANT CHANGE UP (ref 5–17)
ANION GAP SERPL CALC-SCNC: 11 MMOL/L — SIGNIFICANT CHANGE UP (ref 5–17)
ANION GAP SERPL CALC-SCNC: 12 MMOL/L — SIGNIFICANT CHANGE UP (ref 5–17)
ANION GAP SERPL CALC-SCNC: 13 MMOL/L — SIGNIFICANT CHANGE UP (ref 5–17)
ANION GAP SERPL CALC-SCNC: 14 MMOL/L — SIGNIFICANT CHANGE UP (ref 5–17)
ANION GAP SERPL CALC-SCNC: 15 MMOL/L — SIGNIFICANT CHANGE UP (ref 5–17)
ANION GAP SERPL CALC-SCNC: 16 MMOL/L — SIGNIFICANT CHANGE UP (ref 5–17)
ANION GAP SERPL CALC-SCNC: 17 MMOL/L — SIGNIFICANT CHANGE UP (ref 5–17)
ANION GAP SERPL CALC-SCNC: 18 MMOL/L — HIGH (ref 5–17)
ANION GAP SERPL CALC-SCNC: 19 MMOL/L — HIGH (ref 5–17)
ANION GAP SERPL CALC-SCNC: 20 MMOL/L — HIGH (ref 5–17)
ANION GAP SERPL CALC-SCNC: 21 MMOL/L — HIGH (ref 5–17)
ANION GAP SERPL CALC-SCNC: 22 MMOL/L — HIGH (ref 5–17)
ANION GAP SERPL CALC-SCNC: 23 MMOL/L — HIGH (ref 5–17)
ANION GAP SERPL CALC-SCNC: 26 MMOL/L — HIGH (ref 5–17)
ANION GAP SERPL CALC-SCNC: 27 MMOL/L — HIGH (ref 5–17)
ANION GAP SERPL CALC-SCNC: 29 MMOL/L — HIGH (ref 5–17)
ANION GAP SERPL CALC-SCNC: 31 MMOL/L — HIGH (ref 5–17)
ANION GAP SERPL CALC-SCNC: 6 MMOL/L — SIGNIFICANT CHANGE UP (ref 5–17)
ANION GAP SERPL CALC-SCNC: 7 MMOL/L — SIGNIFICANT CHANGE UP (ref 5–17)
ANION GAP SERPL CALC-SCNC: 8 MMOL/L — SIGNIFICANT CHANGE UP (ref 5–17)
ANION GAP SERPL CALC-SCNC: 9 MMOL/L — SIGNIFICANT CHANGE UP (ref 5–17)
ANISOCYTOSIS BLD QL: SIGNIFICANT CHANGE UP
ANISOCYTOSIS BLD QL: SLIGHT — SIGNIFICANT CHANGE UP
APPEARANCE UR: ABNORMAL
APPEARANCE UR: ABNORMAL
APPEARANCE UR: CLEAR — SIGNIFICANT CHANGE UP
APTT 50/50 2HOUR INCUB: 33.5 SEC — SIGNIFICANT CHANGE UP (ref 24.5–36.6)
APTT 50/50 2HOUR INCUB: 38.2 SEC — HIGH (ref 24.5–36.6)
APTT BLD: 104.3 SEC — HIGH (ref 24.5–35.6)
APTT BLD: 106.4 SEC — HIGH (ref 24.5–35.6)
APTT BLD: 109.6 SEC — HIGH (ref 24.5–35.6)
APTT BLD: 120.2 SEC — CRITICAL HIGH (ref 24.5–35.6)
APTT BLD: 126.5 SEC — CRITICAL HIGH (ref 24.5–35.6)
APTT BLD: 139.4 SEC — CRITICAL HIGH (ref 24.5–35.6)
APTT BLD: 19.4 SEC — LOW (ref 24.5–35.6)
APTT BLD: 23.2 SEC — LOW (ref 24.5–35.6)
APTT BLD: 23.3 SEC — LOW (ref 24.5–35.6)
APTT BLD: 23.6 SEC — LOW (ref 24.5–35.6)
APTT BLD: 24.6 SEC — SIGNIFICANT CHANGE UP (ref 24.5–35.6)
APTT BLD: 24.7 SEC — SIGNIFICANT CHANGE UP (ref 24.5–35.6)
APTT BLD: 25.1 SEC — SIGNIFICANT CHANGE UP (ref 24.5–35.6)
APTT BLD: 25.2 SEC — SIGNIFICANT CHANGE UP (ref 24.5–35.6)
APTT BLD: 25.4 SEC — SIGNIFICANT CHANGE UP (ref 24.5–35.6)
APTT BLD: 25.4 SEC — SIGNIFICANT CHANGE UP (ref 24.5–35.6)
APTT BLD: 25.7 SEC — SIGNIFICANT CHANGE UP (ref 24.5–35.6)
APTT BLD: 25.9 SEC — SIGNIFICANT CHANGE UP (ref 24.5–35.6)
APTT BLD: 26.1 SEC — SIGNIFICANT CHANGE UP (ref 24.5–35.6)
APTT BLD: 26.1 SEC — SIGNIFICANT CHANGE UP (ref 24.5–35.6)
APTT BLD: 26.2 SEC — SIGNIFICANT CHANGE UP (ref 24.5–35.6)
APTT BLD: 26.3 SEC — SIGNIFICANT CHANGE UP (ref 24.5–35.6)
APTT BLD: 26.3 SEC — SIGNIFICANT CHANGE UP (ref 24.5–35.6)
APTT BLD: 26.6 SEC — SIGNIFICANT CHANGE UP (ref 24.5–35.6)
APTT BLD: 26.6 SEC — SIGNIFICANT CHANGE UP (ref 24.5–35.6)
APTT BLD: 26.8 SEC — SIGNIFICANT CHANGE UP (ref 24.5–35.6)
APTT BLD: 27.1 SEC — SIGNIFICANT CHANGE UP (ref 24.5–35.6)
APTT BLD: 27.7 SEC — SIGNIFICANT CHANGE UP (ref 24.5–35.6)
APTT BLD: 27.7 SEC — SIGNIFICANT CHANGE UP (ref 24.5–35.6)
APTT BLD: 28.6 SEC — SIGNIFICANT CHANGE UP (ref 24.5–35.6)
APTT BLD: 28.8 SEC — SIGNIFICANT CHANGE UP (ref 24.5–35.6)
APTT BLD: 29.2 SEC — SIGNIFICANT CHANGE UP (ref 24.5–35.6)
APTT BLD: 29.3 SEC — SIGNIFICANT CHANGE UP (ref 24.5–36.6)
APTT BLD: 30.2 SEC — SIGNIFICANT CHANGE UP (ref 24.5–35.6)
APTT BLD: 30.6 SEC — SIGNIFICANT CHANGE UP (ref 24.5–35.6)
APTT BLD: 31.5 SEC — SIGNIFICANT CHANGE UP (ref 24.5–35.6)
APTT BLD: 33 SEC — SIGNIFICANT CHANGE UP (ref 24.5–35.6)
APTT BLD: 33.9 SEC — SIGNIFICANT CHANGE UP (ref 24.5–35.6)
APTT BLD: 35.1 SEC — SIGNIFICANT CHANGE UP (ref 24.5–35.6)
APTT BLD: 35.1 SEC — SIGNIFICANT CHANGE UP (ref 24.5–36.6)
APTT BLD: 36.5 SEC — HIGH (ref 24.5–35.6)
APTT BLD: 37.1 SEC — HIGH (ref 24.5–35.6)
APTT BLD: 38 SEC — HIGH (ref 24.5–35.6)
APTT BLD: 38.7 SEC — HIGH (ref 24.5–35.6)
APTT BLD: 39.3 SEC — HIGH (ref 24.5–35.6)
APTT BLD: 39.4 SEC — HIGH (ref 24.5–35.6)
APTT BLD: 40.4 SEC — HIGH (ref 24.5–35.6)
APTT BLD: 40.7 SEC — HIGH (ref 24.5–35.6)
APTT BLD: 41.4 SEC — HIGH (ref 24.5–35.6)
APTT BLD: 41.6 SEC — HIGH (ref 24.5–35.6)
APTT BLD: 42.4 SEC — HIGH (ref 24.5–35.6)
APTT BLD: 44.3 SEC — HIGH (ref 24.5–35.6)
APTT BLD: 44.5 SEC — HIGH (ref 24.5–35.6)
APTT BLD: 44.5 SEC — HIGH (ref 24.5–35.6)
APTT BLD: 44.9 SEC — HIGH (ref 24.5–35.6)
APTT BLD: 45 SEC — HIGH (ref 24.5–35.6)
APTT BLD: 45.4 SEC — HIGH (ref 24.5–35.6)
APTT BLD: 45.5 SEC — HIGH (ref 24.5–35.6)
APTT BLD: 45.7 SEC — HIGH (ref 24.5–35.6)
APTT BLD: 46.3 SEC — HIGH (ref 24.5–35.6)
APTT BLD: 46.8 SEC — HIGH (ref 24.5–35.6)
APTT BLD: 47.3 SEC — HIGH (ref 24.5–35.6)
APTT BLD: 47.5 SEC — HIGH (ref 24.5–35.6)
APTT BLD: 47.7 SEC — HIGH (ref 24.5–35.6)
APTT BLD: 47.9 SEC — HIGH (ref 24.5–35.6)
APTT BLD: 48 SEC — HIGH (ref 24.5–35.6)
APTT BLD: 48.5 SEC — HIGH (ref 24.5–35.6)
APTT BLD: 49 SEC — HIGH (ref 24.5–35.6)
APTT BLD: 49.3 SEC — HIGH (ref 24.5–35.6)
APTT BLD: 49.3 SEC — HIGH (ref 24.5–35.6)
APTT BLD: 49.4 SEC — HIGH (ref 24.5–35.6)
APTT BLD: 49.5 SEC — HIGH (ref 24.5–35.6)
APTT BLD: 49.5 SEC — HIGH (ref 24.5–35.6)
APTT BLD: 49.8 SEC — HIGH (ref 24.5–35.6)
APTT BLD: 50.5 SEC — HIGH (ref 24.5–35.6)
APTT BLD: 50.7 SEC — HIGH (ref 24.5–35.6)
APTT BLD: 51 SEC — HIGH (ref 24.5–35.6)
APTT BLD: 51.6 SEC — HIGH (ref 24.5–35.6)
APTT BLD: 52 SEC — HIGH (ref 24.5–35.6)
APTT BLD: 52.1 SEC — HIGH (ref 24.5–35.6)
APTT BLD: 52.4 SEC — HIGH (ref 24.5–35.6)
APTT BLD: 52.6 SEC — HIGH (ref 24.5–35.6)
APTT BLD: 52.8 SEC — HIGH (ref 24.5–35.6)
APTT BLD: 52.9 SEC — HIGH (ref 24.5–35.6)
APTT BLD: 52.9 SEC — HIGH (ref 24.5–35.6)
APTT BLD: 53 SEC — HIGH (ref 24.5–35.6)
APTT BLD: 53.5 SEC — HIGH (ref 24.5–35.6)
APTT BLD: 53.7 SEC — HIGH (ref 24.5–35.6)
APTT BLD: 53.8 SEC — HIGH (ref 24.5–35.6)
APTT BLD: 53.8 SEC — HIGH (ref 24.5–35.6)
APTT BLD: 54.9 SEC — HIGH (ref 24.5–35.6)
APTT BLD: 55.8 SEC — HIGH (ref 24.5–35.6)
APTT BLD: 56 SEC — HIGH (ref 24.5–35.6)
APTT BLD: 56 SEC — HIGH (ref 24.5–35.6)
APTT BLD: 56.6 SEC — HIGH (ref 24.5–35.6)
APTT BLD: 56.7 SEC — HIGH (ref 24.5–35.6)
APTT BLD: 57.8 SEC — HIGH (ref 24.5–35.6)
APTT BLD: 57.8 SEC — HIGH (ref 24.5–35.6)
APTT BLD: 60.6 SEC — HIGH (ref 24.5–35.6)
APTT BLD: 60.7 SEC — HIGH (ref 24.5–35.6)
APTT BLD: 62.2 SEC — HIGH (ref 24.5–35.6)
APTT BLD: 62.4 SEC — HIGH (ref 24.5–35.6)
APTT BLD: 66 SEC — HIGH (ref 24.5–35.6)
APTT BLD: 66.2 SEC — HIGH (ref 24.5–35.6)
APTT BLD: 66.8 SEC — HIGH (ref 24.5–35.6)
APTT BLD: 70.6 SEC — HIGH (ref 24.5–35.6)
APTT BLD: 74.5 SEC — HIGH (ref 24.5–35.6)
APTT BLD: 78.2 SEC — HIGH (ref 24.5–35.6)
APTT BLD: 80.6 SEC — HIGH (ref 24.5–35.6)
APTT BLD: 81.3 SEC — HIGH (ref 24.5–35.6)
APTT BLD: 81.5 SEC — HIGH (ref 24.5–35.6)
APTT BLD: 82.4 SEC — HIGH (ref 24.5–35.6)
APTT BLD: 84.5 SEC — HIGH (ref 24.5–35.6)
APTT BLD: 87.5 SEC — HIGH (ref 24.5–35.6)
AST SERPL-CCNC: 101 U/L — HIGH (ref 10–40)
AST SERPL-CCNC: 112 U/L — HIGH (ref 10–40)
AST SERPL-CCNC: 120 U/L — HIGH (ref 10–40)
AST SERPL-CCNC: 1201 U/L — HIGH (ref 10–40)
AST SERPL-CCNC: 123 U/L — HIGH (ref 10–40)
AST SERPL-CCNC: 1243 U/L — HIGH (ref 10–40)
AST SERPL-CCNC: 1303 U/L — HIGH (ref 10–40)
AST SERPL-CCNC: 136 U/L — HIGH (ref 10–40)
AST SERPL-CCNC: 14 U/L — SIGNIFICANT CHANGE UP (ref 10–40)
AST SERPL-CCNC: 1434 U/L — HIGH (ref 10–40)
AST SERPL-CCNC: 15 U/L — SIGNIFICANT CHANGE UP (ref 10–40)
AST SERPL-CCNC: 15 U/L — SIGNIFICANT CHANGE UP (ref 10–40)
AST SERPL-CCNC: 154 U/L — HIGH (ref 10–40)
AST SERPL-CCNC: 1576 U/L — HIGH (ref 10–40)
AST SERPL-CCNC: 16 U/L — SIGNIFICANT CHANGE UP (ref 10–40)
AST SERPL-CCNC: 161 U/L — HIGH (ref 10–40)
AST SERPL-CCNC: 165 U/L — HIGH (ref 10–40)
AST SERPL-CCNC: 17 U/L — SIGNIFICANT CHANGE UP (ref 10–40)
AST SERPL-CCNC: 170 U/L — HIGH (ref 10–40)
AST SERPL-CCNC: 173 U/L — HIGH (ref 10–40)
AST SERPL-CCNC: 177 U/L — HIGH (ref 10–40)
AST SERPL-CCNC: 178 U/L — HIGH (ref 10–40)
AST SERPL-CCNC: 18 U/L — SIGNIFICANT CHANGE UP (ref 10–40)
AST SERPL-CCNC: 19 U/L — SIGNIFICANT CHANGE UP (ref 10–40)
AST SERPL-CCNC: 19 U/L — SIGNIFICANT CHANGE UP (ref 10–40)
AST SERPL-CCNC: 191 U/L — HIGH (ref 10–40)
AST SERPL-CCNC: 192 U/L — HIGH (ref 10–40)
AST SERPL-CCNC: 195 U/L — HIGH (ref 10–40)
AST SERPL-CCNC: 196 U/L — HIGH (ref 10–40)
AST SERPL-CCNC: 197 U/L — HIGH (ref 10–40)
AST SERPL-CCNC: 198 U/L — HIGH (ref 10–40)
AST SERPL-CCNC: 2081 U/L — HIGH (ref 10–40)
AST SERPL-CCNC: 21 U/L — SIGNIFICANT CHANGE UP (ref 10–40)
AST SERPL-CCNC: 21 U/L — SIGNIFICANT CHANGE UP (ref 10–40)
AST SERPL-CCNC: 22 U/L — SIGNIFICANT CHANGE UP (ref 10–40)
AST SERPL-CCNC: 22 U/L — SIGNIFICANT CHANGE UP (ref 10–40)
AST SERPL-CCNC: 221 U/L — HIGH (ref 10–40)
AST SERPL-CCNC: 226 U/L — HIGH (ref 10–40)
AST SERPL-CCNC: 23 U/L — SIGNIFICANT CHANGE UP (ref 10–40)
AST SERPL-CCNC: 23 U/L — SIGNIFICANT CHANGE UP (ref 10–40)
AST SERPL-CCNC: 231 U/L — HIGH (ref 10–40)
AST SERPL-CCNC: 233 U/L — HIGH (ref 10–40)
AST SERPL-CCNC: 236 U/L — HIGH (ref 10–40)
AST SERPL-CCNC: 2373 U/L — HIGH (ref 10–40)
AST SERPL-CCNC: 24 U/L — SIGNIFICANT CHANGE UP (ref 10–40)
AST SERPL-CCNC: 25 U/L — SIGNIFICANT CHANGE UP (ref 10–40)
AST SERPL-CCNC: 25 U/L — SIGNIFICANT CHANGE UP (ref 10–40)
AST SERPL-CCNC: 250 U/L — HIGH (ref 10–40)
AST SERPL-CCNC: 256 U/L — HIGH (ref 10–40)
AST SERPL-CCNC: 26 U/L — SIGNIFICANT CHANGE UP (ref 10–40)
AST SERPL-CCNC: 266 U/L — HIGH (ref 10–40)
AST SERPL-CCNC: 27 U/L — SIGNIFICANT CHANGE UP (ref 10–40)
AST SERPL-CCNC: 27 U/L — SIGNIFICANT CHANGE UP (ref 10–40)
AST SERPL-CCNC: 28 U/L — SIGNIFICANT CHANGE UP (ref 10–40)
AST SERPL-CCNC: 2821 U/L — HIGH (ref 10–40)
AST SERPL-CCNC: 283 U/L — HIGH (ref 10–40)
AST SERPL-CCNC: 29 U/L — SIGNIFICANT CHANGE UP (ref 10–40)
AST SERPL-CCNC: 30 U/L — SIGNIFICANT CHANGE UP (ref 10–40)
AST SERPL-CCNC: 301 U/L — HIGH (ref 10–40)
AST SERPL-CCNC: 308 U/L — HIGH (ref 10–40)
AST SERPL-CCNC: 31 U/L — SIGNIFICANT CHANGE UP (ref 10–40)
AST SERPL-CCNC: 33 U/L — SIGNIFICANT CHANGE UP (ref 10–40)
AST SERPL-CCNC: 331 U/L — HIGH (ref 10–40)
AST SERPL-CCNC: 335 U/L — HIGH (ref 10–40)
AST SERPL-CCNC: 34 U/L — SIGNIFICANT CHANGE UP (ref 10–40)
AST SERPL-CCNC: 34 U/L — SIGNIFICANT CHANGE UP (ref 10–40)
AST SERPL-CCNC: 3599 U/L — HIGH (ref 10–40)
AST SERPL-CCNC: 36 U/L — SIGNIFICANT CHANGE UP (ref 10–40)
AST SERPL-CCNC: 37 U/L — SIGNIFICANT CHANGE UP (ref 10–40)
AST SERPL-CCNC: 37 U/L — SIGNIFICANT CHANGE UP (ref 10–40)
AST SERPL-CCNC: 371 U/L — HIGH (ref 10–40)
AST SERPL-CCNC: 383 U/L — HIGH (ref 10–40)
AST SERPL-CCNC: 3896 U/L — HIGH (ref 10–40)
AST SERPL-CCNC: 40 U/L — SIGNIFICANT CHANGE UP (ref 10–40)
AST SERPL-CCNC: 4095 U/L — HIGH (ref 10–40)
AST SERPL-CCNC: 41 U/L — HIGH (ref 10–40)
AST SERPL-CCNC: 42 U/L — HIGH (ref 10–40)
AST SERPL-CCNC: 43 U/L — HIGH (ref 10–40)
AST SERPL-CCNC: 43 U/L — HIGH (ref 10–40)
AST SERPL-CCNC: 44 U/L — HIGH (ref 10–40)
AST SERPL-CCNC: 4410 U/L — HIGH (ref 10–40)
AST SERPL-CCNC: 446 U/L — HIGH (ref 10–40)
AST SERPL-CCNC: 45 U/L — HIGH (ref 10–40)
AST SERPL-CCNC: 46 U/L — HIGH (ref 10–40)
AST SERPL-CCNC: 46 U/L — HIGH (ref 10–40)
AST SERPL-CCNC: 467 U/L — HIGH (ref 10–40)
AST SERPL-CCNC: 47 U/L — HIGH (ref 10–40)
AST SERPL-CCNC: 48 U/L — HIGH (ref 10–40)
AST SERPL-CCNC: 48 U/L — HIGH (ref 10–40)
AST SERPL-CCNC: 49 U/L — HIGH (ref 10–40)
AST SERPL-CCNC: 5017 U/L — HIGH (ref 10–40)
AST SERPL-CCNC: 51 U/L — HIGH (ref 10–40)
AST SERPL-CCNC: 52 U/L — HIGH (ref 10–40)
AST SERPL-CCNC: 53 U/L — HIGH (ref 10–40)
AST SERPL-CCNC: 530 U/L — HIGH (ref 10–40)
AST SERPL-CCNC: 536 U/L — HIGH (ref 10–40)
AST SERPL-CCNC: 54 U/L — HIGH (ref 10–40)
AST SERPL-CCNC: 55 U/L — HIGH (ref 10–40)
AST SERPL-CCNC: 56 U/L — HIGH (ref 10–40)
AST SERPL-CCNC: 570 U/L — HIGH (ref 10–40)
AST SERPL-CCNC: 59 U/L — HIGH (ref 10–40)
AST SERPL-CCNC: 613 U/L — HIGH (ref 10–40)
AST SERPL-CCNC: 62 U/L — HIGH (ref 10–40)
AST SERPL-CCNC: 62 U/L — HIGH (ref 10–40)
AST SERPL-CCNC: 620 U/L — HIGH (ref 10–40)
AST SERPL-CCNC: 64 U/L — HIGH (ref 10–40)
AST SERPL-CCNC: 65 U/L — HIGH (ref 10–40)
AST SERPL-CCNC: 67 U/L — HIGH (ref 10–40)
AST SERPL-CCNC: 70 U/L — HIGH (ref 10–40)
AST SERPL-CCNC: 712 U/L — HIGH (ref 10–40)
AST SERPL-CCNC: 72 U/L — HIGH (ref 10–40)
AST SERPL-CCNC: 722 U/L — HIGH (ref 10–40)
AST SERPL-CCNC: 73 U/L — HIGH (ref 10–40)
AST SERPL-CCNC: 75 U/L — HIGH (ref 10–40)
AST SERPL-CCNC: 7505 U/L — HIGH (ref 10–40)
AST SERPL-CCNC: 76 U/L — HIGH (ref 10–40)
AST SERPL-CCNC: 77 U/L — HIGH (ref 10–40)
AST SERPL-CCNC: 81 U/L — HIGH (ref 10–40)
AST SERPL-CCNC: 813 U/L — HIGH (ref 10–40)
AST SERPL-CCNC: 82 U/L — HIGH (ref 10–40)
AST SERPL-CCNC: 83 U/L — HIGH (ref 10–40)
AST SERPL-CCNC: 83 U/L — HIGH (ref 10–40)
AST SERPL-CCNC: 85 U/L — HIGH (ref 10–40)
AST SERPL-CCNC: 89 U/L — HIGH (ref 10–40)
AST SERPL-CCNC: 92 U/L — HIGH (ref 10–40)
AST SERPL-CCNC: 92 U/L — HIGH (ref 10–40)
AST SERPL-CCNC: 920 U/L — HIGH (ref 10–40)
AST SERPL-CCNC: 95 U/L — HIGH (ref 10–40)
AST SERPL-CCNC: 95 U/L — HIGH (ref 10–40)
AST SERPL-CCNC: 959 U/L — HIGH (ref 10–40)
B BURGDOR C6 AB SER-ACNC: NEGATIVE — SIGNIFICANT CHANGE UP
B BURGDOR IGG+IGM SER QL IB: SIGNIFICANT CHANGE UP
B BURGDOR IGG+IGM SER-ACNC: 0.05 INDEX — SIGNIFICANT CHANGE UP (ref 0.01–0.9)
B MICROTI DNA BLD QL NAA+PROBE: NEGATIVE — SIGNIFICANT CHANGE UP
B MICROTI DNA BLD QL NAA+PROBE: SIGNIFICANT CHANGE UP
B MIYAMOTOI GLPQ BLD QL NAA+NON-PROBE: NEGATIVE — SIGNIFICANT CHANGE UP
B QUINTANA DNA SPEC QL NAA+PROBE: NEGATIVE — SIGNIFICANT CHANGE UP
B-OH-BUTYR SERPL-SCNC: 0.4 MMOL/L — SIGNIFICANT CHANGE UP
B-OH-BUTYR SERPL-SCNC: 0.9 MMOL/L — HIGH
B-OH-BUTYR SERPL-SCNC: 0.9 MMOL/L — HIGH
B-OH-BUTYR SERPL-SCNC: 2.3 MMOL/L — HIGH
B-OH-BUTYR SERPL-SCNC: 2.3 MMOL/L — HIGH
BABESIA 18S RRNA BLD QL NAA+PROBE: SIGNIFICANT CHANGE UP
BABESIA DNA SPEC QL NAA+PROBE: NEGATIVE — SIGNIFICANT CHANGE UP
BABESIA DNA SPEC QL NAA+PROBE: NEGATIVE — SIGNIFICANT CHANGE UP
BACTERIA # UR AUTO: NEGATIVE /HPF — SIGNIFICANT CHANGE UP
BASE EXCESS BLDMV CALC-SCNC: -1.9 MMOL/L — SIGNIFICANT CHANGE UP (ref -3–3)
BASE EXCESS BLDMV CALC-SCNC: -10.4 MMOL/L — LOW (ref -3–3)
BASE EXCESS BLDMV CALC-SCNC: -11.8 MMOL/L — LOW (ref -3–3)
BASE EXCESS BLDMV CALC-SCNC: -12.5 MMOL/L — LOW (ref -3–3)
BASE EXCESS BLDMV CALC-SCNC: -13.8 MMOL/L — LOW (ref -3–3)
BASE EXCESS BLDMV CALC-SCNC: -15.6 MMOL/L — LOW (ref -3–3)
BASE EXCESS BLDMV CALC-SCNC: -17.1 MMOL/L — LOW (ref -3–3)
BASE EXCESS BLDMV CALC-SCNC: -2.2 MMOL/L — SIGNIFICANT CHANGE UP (ref -3–3)
BASE EXCESS BLDMV CALC-SCNC: -3.4 MMOL/L — LOW (ref -3–3)
BASE EXCESS BLDMV CALC-SCNC: -3.6 MMOL/L — LOW (ref -3–3)
BASE EXCESS BLDMV CALC-SCNC: -3.8 MMOL/L — LOW (ref -3–3)
BASE EXCESS BLDMV CALC-SCNC: -3.9 MMOL/L — LOW (ref -3–3)
BASE EXCESS BLDMV CALC-SCNC: -4 MMOL/L — LOW (ref -3–3)
BASE EXCESS BLDMV CALC-SCNC: -4 MMOL/L — LOW (ref -3–3)
BASE EXCESS BLDMV CALC-SCNC: -4.5 MMOL/L — LOW (ref -3–3)
BASE EXCESS BLDMV CALC-SCNC: -4.7 MMOL/L — LOW (ref -3–3)
BASE EXCESS BLDMV CALC-SCNC: -4.8 MMOL/L — LOW (ref -3–3)
BASE EXCESS BLDMV CALC-SCNC: -4.8 MMOL/L — LOW (ref -3–3)
BASE EXCESS BLDMV CALC-SCNC: -5.1 MMOL/L — LOW (ref -3–3)
BASE EXCESS BLDMV CALC-SCNC: -5.2 MMOL/L — LOW (ref -3–3)
BASE EXCESS BLDMV CALC-SCNC: -5.4 MMOL/L — LOW (ref -3–3)
BASE EXCESS BLDMV CALC-SCNC: -5.4 MMOL/L — LOW (ref -3–3)
BASE EXCESS BLDMV CALC-SCNC: -5.7 MMOL/L — LOW (ref -3–3)
BASE EXCESS BLDMV CALC-SCNC: -5.8 MMOL/L — LOW (ref -3–3)
BASE EXCESS BLDMV CALC-SCNC: -6 MMOL/L — LOW (ref -3–3)
BASE EXCESS BLDMV CALC-SCNC: -6.4 MMOL/L — LOW (ref -3–3)
BASE EXCESS BLDMV CALC-SCNC: -6.4 MMOL/L — LOW (ref -3–3)
BASE EXCESS BLDMV CALC-SCNC: -6.6 MMOL/L — LOW (ref -3–3)
BASE EXCESS BLDMV CALC-SCNC: -6.6 MMOL/L — LOW (ref -3–3)
BASE EXCESS BLDMV CALC-SCNC: -7 MMOL/L — LOW (ref -3–3)
BASE EXCESS BLDMV CALC-SCNC: -7 MMOL/L — LOW (ref -3–3)
BASE EXCESS BLDMV CALC-SCNC: -7.1 MMOL/L — LOW (ref -3–3)
BASE EXCESS BLDMV CALC-SCNC: -7.5 MMOL/L — LOW (ref -3–3)
BASE EXCESS BLDMV CALC-SCNC: -7.7 MMOL/L — LOW (ref -3–3)
BASE EXCESS BLDMV CALC-SCNC: -7.9 MMOL/L — LOW (ref -3–3)
BASE EXCESS BLDMV CALC-SCNC: -8.2 MMOL/L — LOW (ref -3–3)
BASE EXCESS BLDMV CALC-SCNC: -8.4 MMOL/L — LOW (ref -3–3)
BASE EXCESS BLDMV CALC-SCNC: -8.4 MMOL/L — LOW (ref -3–3)
BASE EXCESS BLDMV CALC-SCNC: -8.8 MMOL/L — LOW (ref -3–3)
BASE EXCESS BLDMV CALC-SCNC: -8.8 MMOL/L — LOW (ref -3–3)
BASOPHILS # BLD AUTO: 0 K/UL — SIGNIFICANT CHANGE UP (ref 0–0.2)
BASOPHILS # BLD AUTO: 0.01 K/UL — SIGNIFICANT CHANGE UP (ref 0–0.2)
BASOPHILS # BLD AUTO: 0.01 K/UL — SIGNIFICANT CHANGE UP (ref 0–0.2)
BASOPHILS # BLD AUTO: 0.02 K/UL — SIGNIFICANT CHANGE UP (ref 0–0.2)
BASOPHILS # BLD AUTO: 0.03 K/UL — SIGNIFICANT CHANGE UP (ref 0–0.2)
BASOPHILS # BLD AUTO: 0.03 K/UL — SIGNIFICANT CHANGE UP (ref 0–0.2)
BASOPHILS # BLD AUTO: 0.05 K/UL — SIGNIFICANT CHANGE UP (ref 0–0.2)
BASOPHILS # BLD AUTO: 0.06 K/UL — SIGNIFICANT CHANGE UP (ref 0–0.2)
BASOPHILS # BLD AUTO: 0.08 K/UL — SIGNIFICANT CHANGE UP (ref 0–0.2)
BASOPHILS NFR BLD AUTO: 0 % — SIGNIFICANT CHANGE UP (ref 0–2)
BASOPHILS NFR BLD AUTO: 0.1 % — SIGNIFICANT CHANGE UP (ref 0–2)
BASOPHILS NFR BLD AUTO: 0.2 % — SIGNIFICANT CHANGE UP (ref 0–2)
BASOPHILS NFR BLD AUTO: 0.5 % — SIGNIFICANT CHANGE UP (ref 0–2)
BASOPHILS NFR BLD AUTO: 0.7 % — SIGNIFICANT CHANGE UP (ref 0–2)
BASOPHILS NFR BLD AUTO: 0.9 % — SIGNIFICANT CHANGE UP (ref 0–2)
BASOPHILS NFR BLD AUTO: 0.9 % — SIGNIFICANT CHANGE UP (ref 0–2)
BASOPHILS NFR BLD AUTO: 1.2 % — SIGNIFICANT CHANGE UP (ref 0–2)
BASOPHILS NFR BLD AUTO: 1.7 % — SIGNIFICANT CHANGE UP (ref 0–2)
BASOPHILS NFR BLD AUTO: 1.8 % — SIGNIFICANT CHANGE UP (ref 0–2)
BASOPHILS NFR BLD AUTO: 2.6 % — HIGH (ref 0–2)
BILIRUB DIRECT SERPL-MCNC: 4.4 MG/DL — HIGH (ref 0–0.3)
BILIRUB DIRECT SERPL-MCNC: 8.5 MG/DL — HIGH (ref 0–0.3)
BILIRUB DIRECT SERPL-MCNC: >10 MG/DL — HIGH (ref 0–0.3)
BILIRUB DIRECT SERPL-MCNC: >10 MG/DL — HIGH (ref 0–0.3)
BILIRUB INDIRECT FLD-MCNC: <7.9 MG/DL — HIGH (ref 0.2–1)
BILIRUB INDIRECT FLD-MCNC: <8.8 MG/DL — HIGH (ref 0.2–1)
BILIRUB SERPL-MCNC: 0.4 MG/DL — SIGNIFICANT CHANGE UP (ref 0.2–1.2)
BILIRUB SERPL-MCNC: 0.4 MG/DL — SIGNIFICANT CHANGE UP (ref 0.2–1.2)
BILIRUB SERPL-MCNC: 0.5 MG/DL — SIGNIFICANT CHANGE UP (ref 0.2–1.2)
BILIRUB SERPL-MCNC: 0.6 MG/DL — SIGNIFICANT CHANGE UP (ref 0.2–1.2)
BILIRUB SERPL-MCNC: 0.7 MG/DL — SIGNIFICANT CHANGE UP (ref 0.2–1.2)
BILIRUB SERPL-MCNC: 0.8 MG/DL — SIGNIFICANT CHANGE UP (ref 0.2–1.2)
BILIRUB SERPL-MCNC: 0.8 MG/DL — SIGNIFICANT CHANGE UP (ref 0.2–1.2)
BILIRUB SERPL-MCNC: 0.9 MG/DL — SIGNIFICANT CHANGE UP (ref 0.2–1.2)
BILIRUB SERPL-MCNC: 1.1 MG/DL — SIGNIFICANT CHANGE UP (ref 0.2–1.2)
BILIRUB SERPL-MCNC: 1.4 MG/DL — HIGH (ref 0.2–1.2)
BILIRUB SERPL-MCNC: 1.5 MG/DL — HIGH (ref 0.2–1.2)
BILIRUB SERPL-MCNC: 1.6 MG/DL — HIGH (ref 0.2–1.2)
BILIRUB SERPL-MCNC: 1.7 MG/DL — HIGH (ref 0.2–1.2)
BILIRUB SERPL-MCNC: 1.7 MG/DL — HIGH (ref 0.2–1.2)
BILIRUB SERPL-MCNC: 1.8 MG/DL — HIGH (ref 0.2–1.2)
BILIRUB SERPL-MCNC: 1.9 MG/DL — HIGH (ref 0.2–1.2)
BILIRUB SERPL-MCNC: 1.9 MG/DL — HIGH (ref 0.2–1.2)
BILIRUB SERPL-MCNC: 10 MG/DL — HIGH (ref 0.2–1.2)
BILIRUB SERPL-MCNC: 10.2 MG/DL — HIGH (ref 0.2–1.2)
BILIRUB SERPL-MCNC: 10.4 MG/DL — HIGH (ref 0.2–1.2)
BILIRUB SERPL-MCNC: 10.5 MG/DL — HIGH (ref 0.2–1.2)
BILIRUB SERPL-MCNC: 10.5 MG/DL — HIGH (ref 0.2–1.2)
BILIRUB SERPL-MCNC: 10.7 MG/DL — HIGH (ref 0.2–1.2)
BILIRUB SERPL-MCNC: 10.7 MG/DL — HIGH (ref 0.2–1.2)
BILIRUB SERPL-MCNC: 10.9 MG/DL — HIGH (ref 0.2–1.2)
BILIRUB SERPL-MCNC: 11 MG/DL — HIGH (ref 0.2–1.2)
BILIRUB SERPL-MCNC: 11.2 MG/DL — HIGH (ref 0.2–1.2)
BILIRUB SERPL-MCNC: 11.5 MG/DL — HIGH (ref 0.2–1.2)
BILIRUB SERPL-MCNC: 11.6 MG/DL — HIGH (ref 0.2–1.2)
BILIRUB SERPL-MCNC: 11.8 MG/DL — HIGH (ref 0.2–1.2)
BILIRUB SERPL-MCNC: 12.2 MG/DL — HIGH (ref 0.2–1.2)
BILIRUB SERPL-MCNC: 12.2 MG/DL — HIGH (ref 0.2–1.2)
BILIRUB SERPL-MCNC: 12.4 MG/DL — HIGH (ref 0.2–1.2)
BILIRUB SERPL-MCNC: 12.4 MG/DL — HIGH (ref 0.2–1.2)
BILIRUB SERPL-MCNC: 13.4 MG/DL — HIGH (ref 0.2–1.2)
BILIRUB SERPL-MCNC: 13.4 MG/DL — HIGH (ref 0.2–1.2)
BILIRUB SERPL-MCNC: 13.5 MG/DL — HIGH (ref 0.2–1.2)
BILIRUB SERPL-MCNC: 13.6 MG/DL — HIGH (ref 0.2–1.2)
BILIRUB SERPL-MCNC: 13.8 MG/DL — HIGH (ref 0.2–1.2)
BILIRUB SERPL-MCNC: 14 MG/DL — HIGH (ref 0.2–1.2)
BILIRUB SERPL-MCNC: 14.1 MG/DL — HIGH (ref 0.2–1.2)
BILIRUB SERPL-MCNC: 14.4 MG/DL — HIGH (ref 0.2–1.2)
BILIRUB SERPL-MCNC: 14.6 MG/DL — HIGH (ref 0.2–1.2)
BILIRUB SERPL-MCNC: 14.8 MG/DL — HIGH (ref 0.2–1.2)
BILIRUB SERPL-MCNC: 15.6 MG/DL — HIGH (ref 0.2–1.2)
BILIRUB SERPL-MCNC: 15.7 MG/DL — HIGH (ref 0.2–1.2)
BILIRUB SERPL-MCNC: 15.8 MG/DL — HIGH (ref 0.2–1.2)
BILIRUB SERPL-MCNC: 16.4 MG/DL — HIGH (ref 0.2–1.2)
BILIRUB SERPL-MCNC: 16.6 MG/DL — HIGH (ref 0.2–1.2)
BILIRUB SERPL-MCNC: 16.7 MG/DL — HIGH (ref 0.2–1.2)
BILIRUB SERPL-MCNC: 16.8 MG/DL — HIGH (ref 0.2–1.2)
BILIRUB SERPL-MCNC: 16.9 MG/DL — HIGH (ref 0.2–1.2)
BILIRUB SERPL-MCNC: 16.9 MG/DL — HIGH (ref 0.2–1.2)
BILIRUB SERPL-MCNC: 17.1 MG/DL — HIGH (ref 0.2–1.2)
BILIRUB SERPL-MCNC: 17.3 MG/DL — HIGH (ref 0.2–1.2)
BILIRUB SERPL-MCNC: 17.3 MG/DL — HIGH (ref 0.2–1.2)
BILIRUB SERPL-MCNC: 17.5 MG/DL — HIGH (ref 0.2–1.2)
BILIRUB SERPL-MCNC: 17.6 MG/DL — HIGH (ref 0.2–1.2)
BILIRUB SERPL-MCNC: 17.6 MG/DL — HIGH (ref 0.2–1.2)
BILIRUB SERPL-MCNC: 17.8 MG/DL — HIGH (ref 0.2–1.2)
BILIRUB SERPL-MCNC: 17.9 MG/DL — HIGH (ref 0.2–1.2)
BILIRUB SERPL-MCNC: 18.2 MG/DL — HIGH (ref 0.2–1.2)
BILIRUB SERPL-MCNC: 18.6 MG/DL — HIGH (ref 0.2–1.2)
BILIRUB SERPL-MCNC: 18.6 MG/DL — HIGH (ref 0.2–1.2)
BILIRUB SERPL-MCNC: 18.8 MG/DL — HIGH (ref 0.2–1.2)
BILIRUB SERPL-MCNC: 19.2 MG/DL — HIGH (ref 0.2–1.2)
BILIRUB SERPL-MCNC: 2 MG/DL — HIGH (ref 0.2–1.2)
BILIRUB SERPL-MCNC: 2 MG/DL — HIGH (ref 0.2–1.2)
BILIRUB SERPL-MCNC: 2.2 MG/DL — HIGH (ref 0.2–1.2)
BILIRUB SERPL-MCNC: 2.4 MG/DL — HIGH (ref 0.2–1.2)
BILIRUB SERPL-MCNC: 2.6 MG/DL — HIGH (ref 0.2–1.2)
BILIRUB SERPL-MCNC: 2.7 MG/DL — HIGH (ref 0.2–1.2)
BILIRUB SERPL-MCNC: 2.7 MG/DL — HIGH (ref 0.2–1.2)
BILIRUB SERPL-MCNC: 20.1 MG/DL — HIGH (ref 0.2–1.2)
BILIRUB SERPL-MCNC: 20.7 MG/DL — HIGH (ref 0.2–1.2)
BILIRUB SERPL-MCNC: 3.9 MG/DL — HIGH (ref 0.2–1.2)
BILIRUB SERPL-MCNC: 3.9 MG/DL — HIGH (ref 0.2–1.2)
BILIRUB SERPL-MCNC: 4.1 MG/DL — HIGH (ref 0.2–1.2)
BILIRUB SERPL-MCNC: 4.2 MG/DL — HIGH (ref 0.2–1.2)
BILIRUB SERPL-MCNC: 4.3 MG/DL — HIGH (ref 0.2–1.2)
BILIRUB SERPL-MCNC: 4.4 MG/DL — HIGH (ref 0.2–1.2)
BILIRUB SERPL-MCNC: 4.4 MG/DL — HIGH (ref 0.2–1.2)
BILIRUB SERPL-MCNC: 4.7 MG/DL — HIGH (ref 0.2–1.2)
BILIRUB SERPL-MCNC: 4.7 MG/DL — HIGH (ref 0.2–1.2)
BILIRUB SERPL-MCNC: 6 MG/DL — HIGH (ref 0.2–1.2)
BILIRUB SERPL-MCNC: 6.8 MG/DL — HIGH (ref 0.2–1.2)
BILIRUB SERPL-MCNC: 7.4 MG/DL — HIGH (ref 0.2–1.2)
BILIRUB SERPL-MCNC: 7.8 MG/DL — HIGH (ref 0.2–1.2)
BILIRUB SERPL-MCNC: 8.1 MG/DL — HIGH (ref 0.2–1.2)
BILIRUB SERPL-MCNC: 8.4 MG/DL — HIGH (ref 0.2–1.2)
BILIRUB SERPL-MCNC: 8.7 MG/DL — HIGH (ref 0.2–1.2)
BILIRUB SERPL-MCNC: 8.9 MG/DL — HIGH (ref 0.2–1.2)
BILIRUB SERPL-MCNC: 8.9 MG/DL — HIGH (ref 0.2–1.2)
BILIRUB SERPL-MCNC: 9.2 MG/DL — HIGH (ref 0.2–1.2)
BILIRUB SERPL-MCNC: 9.3 MG/DL — HIGH (ref 0.2–1.2)
BILIRUB SERPL-MCNC: 9.7 MG/DL — HIGH (ref 0.2–1.2)
BILIRUB SERPL-MCNC: 9.9 MG/DL — HIGH (ref 0.2–1.2)
BILIRUB UR-MCNC: ABNORMAL
BILIRUB UR-MCNC: ABNORMAL
BILIRUB UR-MCNC: NEGATIVE — SIGNIFICANT CHANGE UP
BLD GP AB SCN SERPL QL: NEGATIVE — SIGNIFICANT CHANGE UP
BUN SERPL-MCNC: 101 MG/DL — HIGH (ref 7–23)
BUN SERPL-MCNC: 102 MG/DL — HIGH (ref 7–23)
BUN SERPL-MCNC: 103 MG/DL — HIGH (ref 7–23)
BUN SERPL-MCNC: 104 MG/DL — HIGH (ref 7–23)
BUN SERPL-MCNC: 105 MG/DL — HIGH (ref 7–23)
BUN SERPL-MCNC: 111 MG/DL — HIGH (ref 7–23)
BUN SERPL-MCNC: 116 MG/DL — HIGH (ref 7–23)
BUN SERPL-MCNC: 118 MG/DL — HIGH (ref 7–23)
BUN SERPL-MCNC: 121 MG/DL — HIGH (ref 7–23)
BUN SERPL-MCNC: 123 MG/DL — HIGH (ref 7–23)
BUN SERPL-MCNC: 16 MG/DL — SIGNIFICANT CHANGE UP (ref 7–23)
BUN SERPL-MCNC: 17 MG/DL — SIGNIFICANT CHANGE UP (ref 7–23)
BUN SERPL-MCNC: 17 MG/DL — SIGNIFICANT CHANGE UP (ref 7–23)
BUN SERPL-MCNC: 19 MG/DL — SIGNIFICANT CHANGE UP (ref 7–23)
BUN SERPL-MCNC: 20 MG/DL — SIGNIFICANT CHANGE UP (ref 7–23)
BUN SERPL-MCNC: 21 MG/DL — SIGNIFICANT CHANGE UP (ref 7–23)
BUN SERPL-MCNC: 22 MG/DL — SIGNIFICANT CHANGE UP (ref 7–23)
BUN SERPL-MCNC: 23 MG/DL — SIGNIFICANT CHANGE UP (ref 7–23)
BUN SERPL-MCNC: 23 MG/DL — SIGNIFICANT CHANGE UP (ref 7–23)
BUN SERPL-MCNC: 24 MG/DL — HIGH (ref 7–23)
BUN SERPL-MCNC: 27 MG/DL — HIGH (ref 7–23)
BUN SERPL-MCNC: 29 MG/DL — HIGH (ref 7–23)
BUN SERPL-MCNC: 31 MG/DL — HIGH (ref 7–23)
BUN SERPL-MCNC: 32 MG/DL — HIGH (ref 7–23)
BUN SERPL-MCNC: 32 MG/DL — HIGH (ref 7–23)
BUN SERPL-MCNC: 33 MG/DL — HIGH (ref 7–23)
BUN SERPL-MCNC: 34 MG/DL — HIGH (ref 7–23)
BUN SERPL-MCNC: 35 MG/DL — HIGH (ref 7–23)
BUN SERPL-MCNC: 36 MG/DL — HIGH (ref 7–23)
BUN SERPL-MCNC: 37 MG/DL — HIGH (ref 7–23)
BUN SERPL-MCNC: 38 MG/DL — HIGH (ref 7–23)
BUN SERPL-MCNC: 39 MG/DL — HIGH (ref 7–23)
BUN SERPL-MCNC: 40 MG/DL — HIGH (ref 7–23)
BUN SERPL-MCNC: 43 MG/DL — HIGH (ref 7–23)
BUN SERPL-MCNC: 44 MG/DL — HIGH (ref 7–23)
BUN SERPL-MCNC: 45 MG/DL — HIGH (ref 7–23)
BUN SERPL-MCNC: 45 MG/DL — HIGH (ref 7–23)
BUN SERPL-MCNC: 46 MG/DL — HIGH (ref 7–23)
BUN SERPL-MCNC: 46 MG/DL — HIGH (ref 7–23)
BUN SERPL-MCNC: 48 MG/DL — HIGH (ref 7–23)
BUN SERPL-MCNC: 50 MG/DL — HIGH (ref 7–23)
BUN SERPL-MCNC: 51 MG/DL — HIGH (ref 7–23)
BUN SERPL-MCNC: 52 MG/DL — HIGH (ref 7–23)
BUN SERPL-MCNC: 53 MG/DL — HIGH (ref 7–23)
BUN SERPL-MCNC: 54 MG/DL — HIGH (ref 7–23)
BUN SERPL-MCNC: 55 MG/DL — HIGH (ref 7–23)
BUN SERPL-MCNC: 55 MG/DL — HIGH (ref 7–23)
BUN SERPL-MCNC: 56 MG/DL — HIGH (ref 7–23)
BUN SERPL-MCNC: 57 MG/DL — HIGH (ref 7–23)
BUN SERPL-MCNC: 58 MG/DL — HIGH (ref 7–23)
BUN SERPL-MCNC: 59 MG/DL — HIGH (ref 7–23)
BUN SERPL-MCNC: 60 MG/DL — HIGH (ref 7–23)
BUN SERPL-MCNC: 60 MG/DL — HIGH (ref 7–23)
BUN SERPL-MCNC: 61 MG/DL — HIGH (ref 7–23)
BUN SERPL-MCNC: 61 MG/DL — HIGH (ref 7–23)
BUN SERPL-MCNC: 62 MG/DL — HIGH (ref 7–23)
BUN SERPL-MCNC: 65 MG/DL — HIGH (ref 7–23)
BUN SERPL-MCNC: 67 MG/DL — HIGH (ref 7–23)
BUN SERPL-MCNC: 69 MG/DL — HIGH (ref 7–23)
BUN SERPL-MCNC: 71 MG/DL — HIGH (ref 7–23)
BUN SERPL-MCNC: 74 MG/DL — HIGH (ref 7–23)
BUN SERPL-MCNC: 75 MG/DL — HIGH (ref 7–23)
BUN SERPL-MCNC: 75 MG/DL — HIGH (ref 7–23)
BUN SERPL-MCNC: 82 MG/DL — HIGH (ref 7–23)
BUN SERPL-MCNC: 88 MG/DL — HIGH (ref 7–23)
BUN SERPL-MCNC: 91 MG/DL — HIGH (ref 7–23)
BUN SERPL-MCNC: 92 MG/DL — HIGH (ref 7–23)
BUN SERPL-MCNC: 94 MG/DL — HIGH (ref 7–23)
BUN SERPL-MCNC: 95 MG/DL — HIGH (ref 7–23)
BUN SERPL-MCNC: 97 MG/DL — HIGH (ref 7–23)
BURR CELLS BLD QL SMEAR: PRESENT — SIGNIFICANT CHANGE UP
C DIFF GDH STL QL: NEGATIVE — SIGNIFICANT CHANGE UP
C DIFF GDH STL QL: SIGNIFICANT CHANGE UP
CA-I BLDA-SCNC: 1.38 MMOL/L — HIGH (ref 1.15–1.33)
CALCIUM SERPL-MCNC: 6.9 MG/DL — LOW (ref 8.4–10.5)
CALCIUM SERPL-MCNC: 7 MG/DL — LOW (ref 8.4–10.5)
CALCIUM SERPL-MCNC: 7.1 MG/DL — LOW (ref 8.4–10.5)
CALCIUM SERPL-MCNC: 7.2 MG/DL — LOW (ref 8.4–10.5)
CALCIUM SERPL-MCNC: 7.3 MG/DL — LOW (ref 8.4–10.5)
CALCIUM SERPL-MCNC: 7.4 MG/DL — LOW (ref 8.4–10.5)
CALCIUM SERPL-MCNC: 7.5 MG/DL — LOW (ref 8.4–10.5)
CALCIUM SERPL-MCNC: 7.6 MG/DL — LOW (ref 8.4–10.5)
CALCIUM SERPL-MCNC: 7.7 MG/DL — LOW (ref 8.4–10.5)
CALCIUM SERPL-MCNC: 7.8 MG/DL — LOW (ref 8.4–10.5)
CALCIUM SERPL-MCNC: 7.9 MG/DL — LOW (ref 8.4–10.5)
CALCIUM SERPL-MCNC: 8 MG/DL — LOW (ref 8.4–10.5)
CALCIUM SERPL-MCNC: 8.1 MG/DL — LOW (ref 8.4–10.5)
CALCIUM SERPL-MCNC: 8.2 MG/DL — LOW (ref 8.4–10.5)
CALCIUM SERPL-MCNC: 8.3 MG/DL — LOW (ref 8.4–10.5)
CALCIUM SERPL-MCNC: 8.3 MG/DL — LOW (ref 8.4–10.5)
CALCIUM SERPL-MCNC: 8.4 MG/DL — SIGNIFICANT CHANGE UP (ref 8.4–10.5)
CALCIUM SERPL-MCNC: 8.5 MG/DL — SIGNIFICANT CHANGE UP (ref 8.4–10.5)
CALCIUM SERPL-MCNC: 8.5 MG/DL — SIGNIFICANT CHANGE UP (ref 8.4–10.5)
CALCIUM SERPL-MCNC: 8.7 MG/DL — SIGNIFICANT CHANGE UP (ref 8.4–10.5)
CALCIUM SERPL-MCNC: 8.8 MG/DL — SIGNIFICANT CHANGE UP (ref 8.4–10.5)
CALCIUM SERPL-MCNC: 8.9 MG/DL — SIGNIFICANT CHANGE UP (ref 8.4–10.5)
CALCIUM SERPL-MCNC: 9 MG/DL — SIGNIFICANT CHANGE UP (ref 8.4–10.5)
CALCIUM SERPL-MCNC: 9 MG/DL — SIGNIFICANT CHANGE UP (ref 8.4–10.5)
CALCIUM SERPL-MCNC: 9.1 MG/DL — SIGNIFICANT CHANGE UP (ref 8.4–10.5)
CALCIUM SERPL-MCNC: 9.2 MG/DL — SIGNIFICANT CHANGE UP (ref 8.4–10.5)
CALCIUM SERPL-MCNC: 9.3 MG/DL — SIGNIFICANT CHANGE UP (ref 8.4–10.5)
CALCIUM SERPL-MCNC: 9.3 MG/DL — SIGNIFICANT CHANGE UP (ref 8.4–10.5)
CAST: 0 /LPF — SIGNIFICANT CHANGE UP (ref 0–4)
CAST: 10 /LPF — HIGH (ref 0–4)
CAST: 13 /LPF — HIGH (ref 0–4)
CHLORIDE SERPL-SCNC: 100 MMOL/L — SIGNIFICANT CHANGE UP (ref 96–108)
CHLORIDE SERPL-SCNC: 101 MMOL/L — SIGNIFICANT CHANGE UP (ref 96–108)
CHLORIDE SERPL-SCNC: 102 MMOL/L — SIGNIFICANT CHANGE UP (ref 96–108)
CHLORIDE SERPL-SCNC: 103 MMOL/L — SIGNIFICANT CHANGE UP (ref 96–108)
CHLORIDE SERPL-SCNC: 104 MMOL/L — SIGNIFICANT CHANGE UP (ref 96–108)
CHLORIDE SERPL-SCNC: 105 MMOL/L — SIGNIFICANT CHANGE UP (ref 96–108)
CHLORIDE SERPL-SCNC: 106 MMOL/L — SIGNIFICANT CHANGE UP (ref 96–108)
CHLORIDE SERPL-SCNC: 107 MMOL/L — SIGNIFICANT CHANGE UP (ref 96–108)
CHLORIDE SERPL-SCNC: 108 MMOL/L — SIGNIFICANT CHANGE UP (ref 96–108)
CHLORIDE SERPL-SCNC: 109 MMOL/L — HIGH (ref 96–108)
CHLORIDE SERPL-SCNC: 109 MMOL/L — HIGH (ref 96–108)
CHLORIDE SERPL-SCNC: 110 MMOL/L — HIGH (ref 96–108)
CHLORIDE SERPL-SCNC: 111 MMOL/L — HIGH (ref 96–108)
CHLORIDE SERPL-SCNC: 112 MMOL/L — HIGH (ref 96–108)
CHLORIDE SERPL-SCNC: 113 MMOL/L — HIGH (ref 96–108)
CHLORIDE SERPL-SCNC: 114 MMOL/L — HIGH (ref 96–108)
CHLORIDE SERPL-SCNC: 115 MMOL/L — HIGH (ref 96–108)
CHLORIDE SERPL-SCNC: 116 MMOL/L — HIGH (ref 96–108)
CHLORIDE SERPL-SCNC: 116 MMOL/L — HIGH (ref 96–108)
CHLORIDE SERPL-SCNC: 96 MMOL/L — SIGNIFICANT CHANGE UP (ref 96–108)
CHLORIDE SERPL-SCNC: 97 MMOL/L — SIGNIFICANT CHANGE UP (ref 96–108)
CHLORIDE SERPL-SCNC: 98 MMOL/L — SIGNIFICANT CHANGE UP (ref 96–108)
CHLORIDE SERPL-SCNC: 98 MMOL/L — SIGNIFICANT CHANGE UP (ref 96–108)
CHLORIDE SERPL-SCNC: 99 MMOL/L — SIGNIFICANT CHANGE UP (ref 96–108)
CK MB BLD-MCNC: 0.7 % — SIGNIFICANT CHANGE UP (ref 0–3.5)
CK MB BLD-MCNC: 3.5 % — SIGNIFICANT CHANGE UP (ref 0–3.5)
CK MB BLD-MCNC: 4.1 % — HIGH (ref 0–3.5)
CK MB BLD-MCNC: 4.3 % — HIGH (ref 0–3.5)
CK MB BLD-MCNC: 4.4 % — HIGH (ref 0–3.5)
CK MB BLD-MCNC: 4.5 % — HIGH (ref 0–3.5)
CK MB BLD-MCNC: 4.7 % — HIGH (ref 0–3.5)
CK MB BLD-MCNC: 5.9 % — HIGH (ref 0–3.5)
CK MB CFR SERPL CALC: 3.4 NG/ML — SIGNIFICANT CHANGE UP (ref 0–6.7)
CK MB CFR SERPL CALC: 4.1 NG/ML — SIGNIFICANT CHANGE UP (ref 0–6.7)
CK MB CFR SERPL CALC: 4.8 NG/ML — SIGNIFICANT CHANGE UP (ref 0–6.7)
CK MB CFR SERPL CALC: 6.3 NG/ML — SIGNIFICANT CHANGE UP (ref 0–6.7)
CK MB CFR SERPL CALC: 6.4 NG/ML — SIGNIFICANT CHANGE UP (ref 0–6.7)
CK MB CFR SERPL CALC: 6.6 NG/ML — SIGNIFICANT CHANGE UP (ref 0–6.7)
CK MB CFR SERPL CALC: 7.5 NG/ML — HIGH (ref 0–6.7)
CK MB CFR SERPL CALC: 7.6 NG/ML — HIGH (ref 0–6.7)
CK MB CFR SERPL CALC: 7.9 NG/ML — HIGH (ref 0–6.7)
CK MB CFR SERPL CALC: 9.2 NG/ML — HIGH (ref 0–6.7)
CK SERPL-CCNC: 133 U/L — SIGNIFICANT CHANGE UP (ref 30–200)
CK SERPL-CCNC: 134 U/L — SIGNIFICANT CHANGE UP (ref 30–200)
CK SERPL-CCNC: 151 U/L — SIGNIFICANT CHANGE UP (ref 30–200)
CK SERPL-CCNC: 168 U/L — SIGNIFICANT CHANGE UP (ref 30–200)
CK SERPL-CCNC: 181 U/L — SIGNIFICANT CHANGE UP (ref 30–200)
CK SERPL-CCNC: 186 U/L — SIGNIFICANT CHANGE UP (ref 30–200)
CK SERPL-CCNC: 216 U/L — HIGH (ref 30–200)
CK SERPL-CCNC: 691 U/L — HIGH (ref 30–200)
CLOSURE TME COLL+EPINEP BLD: 31 K/UL — LOW (ref 150–400)
CMV DNA CSF QL NAA+PROBE: 146 IU/ML — HIGH
CMV DNA CSF QL NAA+PROBE: ABNORMAL IU/ML
CMV DNA CSF QL NAA+PROBE: SIGNIFICANT CHANGE UP IU/ML
CMV DNA CSF QL NAA+PROBE: SIGNIFICANT CHANGE UP IU/ML
CMV DNA SPEC NAA+PROBE-LOG#: 2.16 LOG10IU/ML — HIGH
CMV DNA SPEC NAA+PROBE-LOG#: ABNORMAL LOG10IU/ML
CMV DNA SPEC NAA+PROBE-LOG#: SIGNIFICANT CHANGE UP LOG10IU/ML
CMV DNA SPEC NAA+PROBE-LOG#: SIGNIFICANT CHANGE UP LOG10IU/ML
CMV IGG FLD QL: 1.5 U/ML — HIGH
CMV IGG SERPL-IMP: POSITIVE
CO2 BLDMV-SCNC: 13 MMOL/L — LOW (ref 21–29)
CO2 BLDMV-SCNC: 15 MMOL/L — LOW (ref 21–29)
CO2 BLDMV-SCNC: 15 MMOL/L — LOW (ref 21–29)
CO2 BLDMV-SCNC: 16 MMOL/L — LOW (ref 21–29)
CO2 BLDMV-SCNC: 16 MMOL/L — LOW (ref 21–29)
CO2 BLDMV-SCNC: 17 MMOL/L — LOW (ref 21–29)
CO2 BLDMV-SCNC: 18 MMOL/L — LOW (ref 21–29)
CO2 BLDMV-SCNC: 19 MMOL/L — LOW (ref 21–29)
CO2 BLDMV-SCNC: 19 MMOL/L — LOW (ref 21–29)
CO2 BLDMV-SCNC: 20 MMOL/L — LOW (ref 21–29)
CO2 BLDMV-SCNC: 21 MMOL/L — SIGNIFICANT CHANGE UP (ref 21–29)
CO2 BLDMV-SCNC: 22 MMOL/L — SIGNIFICANT CHANGE UP (ref 21–29)
CO2 BLDMV-SCNC: 23 MMOL/L — SIGNIFICANT CHANGE UP (ref 21–29)
CO2 BLDMV-SCNC: 24 MMOL/L — SIGNIFICANT CHANGE UP (ref 21–29)
CO2 SERPL-SCNC: 10 MMOL/L — CRITICAL LOW (ref 22–31)
CO2 SERPL-SCNC: 11 MMOL/L — LOW (ref 22–31)
CO2 SERPL-SCNC: 11 MMOL/L — LOW (ref 22–31)
CO2 SERPL-SCNC: 12 MMOL/L — LOW (ref 22–31)
CO2 SERPL-SCNC: 13 MMOL/L — LOW (ref 22–31)
CO2 SERPL-SCNC: 13 MMOL/L — LOW (ref 22–31)
CO2 SERPL-SCNC: 14 MMOL/L — LOW (ref 22–31)
CO2 SERPL-SCNC: 15 MMOL/L — LOW (ref 22–31)
CO2 SERPL-SCNC: 16 MMOL/L — LOW (ref 22–31)
CO2 SERPL-SCNC: 17 MMOL/L — LOW (ref 22–31)
CO2 SERPL-SCNC: 18 MMOL/L — LOW (ref 22–31)
CO2 SERPL-SCNC: 19 MMOL/L — LOW (ref 22–31)
CO2 SERPL-SCNC: 20 MMOL/L — LOW (ref 22–31)
CO2 SERPL-SCNC: 21 MMOL/L — LOW (ref 22–31)
CO2 SERPL-SCNC: 22 MMOL/L — SIGNIFICANT CHANGE UP (ref 22–31)
CO2 SERPL-SCNC: 23 MMOL/L — SIGNIFICANT CHANGE UP (ref 22–31)
CO2 SERPL-SCNC: 24 MMOL/L — SIGNIFICANT CHANGE UP (ref 22–31)
CO2 SERPL-SCNC: 25 MMOL/L — SIGNIFICANT CHANGE UP (ref 22–31)
CO2 SERPL-SCNC: 25 MMOL/L — SIGNIFICANT CHANGE UP (ref 22–31)
CO2 SERPL-SCNC: 26 MMOL/L — SIGNIFICANT CHANGE UP (ref 22–31)
COLOR SPEC: ABNORMAL
COLOR SPEC: SIGNIFICANT CHANGE UP
COLOR SPEC: YELLOW — SIGNIFICANT CHANGE UP
CREAT SERPL-MCNC: 0.3 MG/DL — LOW (ref 0.5–1.3)
CREAT SERPL-MCNC: 0.32 MG/DL — LOW (ref 0.5–1.3)
CREAT SERPL-MCNC: 0.32 MG/DL — LOW (ref 0.5–1.3)
CREAT SERPL-MCNC: 0.34 MG/DL — LOW (ref 0.5–1.3)
CREAT SERPL-MCNC: 0.35 MG/DL — LOW (ref 0.5–1.3)
CREAT SERPL-MCNC: 0.36 MG/DL — LOW (ref 0.5–1.3)
CREAT SERPL-MCNC: 0.36 MG/DL — LOW (ref 0.5–1.3)
CREAT SERPL-MCNC: 0.37 MG/DL — LOW (ref 0.5–1.3)
CREAT SERPL-MCNC: 0.37 MG/DL — LOW (ref 0.5–1.3)
CREAT SERPL-MCNC: 0.38 MG/DL — LOW (ref 0.5–1.3)
CREAT SERPL-MCNC: 0.39 MG/DL — LOW (ref 0.5–1.3)
CREAT SERPL-MCNC: 0.4 MG/DL — LOW (ref 0.5–1.3)
CREAT SERPL-MCNC: 0.42 MG/DL — LOW (ref 0.5–1.3)
CREAT SERPL-MCNC: 0.46 MG/DL — LOW (ref 0.5–1.3)
CREAT SERPL-MCNC: 0.48 MG/DL — LOW (ref 0.5–1.3)
CREAT SERPL-MCNC: 0.52 MG/DL — SIGNIFICANT CHANGE UP (ref 0.5–1.3)
CREAT SERPL-MCNC: 0.52 MG/DL — SIGNIFICANT CHANGE UP (ref 0.5–1.3)
CREAT SERPL-MCNC: 0.54 MG/DL — SIGNIFICANT CHANGE UP (ref 0.5–1.3)
CREAT SERPL-MCNC: 0.58 MG/DL — SIGNIFICANT CHANGE UP (ref 0.5–1.3)
CREAT SERPL-MCNC: 0.6 MG/DL — SIGNIFICANT CHANGE UP (ref 0.5–1.3)
CREAT SERPL-MCNC: 0.61 MG/DL — SIGNIFICANT CHANGE UP (ref 0.5–1.3)
CREAT SERPL-MCNC: 0.62 MG/DL — SIGNIFICANT CHANGE UP (ref 0.5–1.3)
CREAT SERPL-MCNC: 0.62 MG/DL — SIGNIFICANT CHANGE UP (ref 0.5–1.3)
CREAT SERPL-MCNC: 0.63 MG/DL — SIGNIFICANT CHANGE UP (ref 0.5–1.3)
CREAT SERPL-MCNC: 0.64 MG/DL — SIGNIFICANT CHANGE UP (ref 0.5–1.3)
CREAT SERPL-MCNC: 0.65 MG/DL — SIGNIFICANT CHANGE UP (ref 0.5–1.3)
CREAT SERPL-MCNC: 0.65 MG/DL — SIGNIFICANT CHANGE UP (ref 0.5–1.3)
CREAT SERPL-MCNC: 0.66 MG/DL — SIGNIFICANT CHANGE UP (ref 0.5–1.3)
CREAT SERPL-MCNC: 0.67 MG/DL — SIGNIFICANT CHANGE UP (ref 0.5–1.3)
CREAT SERPL-MCNC: 0.68 MG/DL — SIGNIFICANT CHANGE UP (ref 0.5–1.3)
CREAT SERPL-MCNC: 0.68 MG/DL — SIGNIFICANT CHANGE UP (ref 0.5–1.3)
CREAT SERPL-MCNC: 0.72 MG/DL — SIGNIFICANT CHANGE UP (ref 0.5–1.3)
CREAT SERPL-MCNC: 0.73 MG/DL — SIGNIFICANT CHANGE UP (ref 0.5–1.3)
CREAT SERPL-MCNC: 0.74 MG/DL — SIGNIFICANT CHANGE UP (ref 0.5–1.3)
CREAT SERPL-MCNC: 0.76 MG/DL — SIGNIFICANT CHANGE UP (ref 0.5–1.3)
CREAT SERPL-MCNC: 0.77 MG/DL — SIGNIFICANT CHANGE UP (ref 0.5–1.3)
CREAT SERPL-MCNC: 0.78 MG/DL — SIGNIFICANT CHANGE UP (ref 0.5–1.3)
CREAT SERPL-MCNC: 0.79 MG/DL — SIGNIFICANT CHANGE UP (ref 0.5–1.3)
CREAT SERPL-MCNC: 0.81 MG/DL — SIGNIFICANT CHANGE UP (ref 0.5–1.3)
CREAT SERPL-MCNC: 0.87 MG/DL — SIGNIFICANT CHANGE UP (ref 0.5–1.3)
CREAT SERPL-MCNC: 0.87 MG/DL — SIGNIFICANT CHANGE UP (ref 0.5–1.3)
CREAT SERPL-MCNC: 0.89 MG/DL — SIGNIFICANT CHANGE UP (ref 0.5–1.3)
CREAT SERPL-MCNC: 0.89 MG/DL — SIGNIFICANT CHANGE UP (ref 0.5–1.3)
CREAT SERPL-MCNC: 0.91 MG/DL — SIGNIFICANT CHANGE UP (ref 0.5–1.3)
CREAT SERPL-MCNC: 0.97 MG/DL — SIGNIFICANT CHANGE UP (ref 0.5–1.3)
CREAT SERPL-MCNC: 0.98 MG/DL — SIGNIFICANT CHANGE UP (ref 0.5–1.3)
CREAT SERPL-MCNC: 1.01 MG/DL — SIGNIFICANT CHANGE UP (ref 0.5–1.3)
CREAT SERPL-MCNC: 1.02 MG/DL — SIGNIFICANT CHANGE UP (ref 0.5–1.3)
CREAT SERPL-MCNC: 1.03 MG/DL — SIGNIFICANT CHANGE UP (ref 0.5–1.3)
CREAT SERPL-MCNC: 1.05 MG/DL — SIGNIFICANT CHANGE UP (ref 0.5–1.3)
CREAT SERPL-MCNC: 1.05 MG/DL — SIGNIFICANT CHANGE UP (ref 0.5–1.3)
CREAT SERPL-MCNC: 1.06 MG/DL — SIGNIFICANT CHANGE UP (ref 0.5–1.3)
CREAT SERPL-MCNC: 1.07 MG/DL — SIGNIFICANT CHANGE UP (ref 0.5–1.3)
CREAT SERPL-MCNC: 1.08 MG/DL — SIGNIFICANT CHANGE UP (ref 0.5–1.3)
CREAT SERPL-MCNC: 1.08 MG/DL — SIGNIFICANT CHANGE UP (ref 0.5–1.3)
CREAT SERPL-MCNC: 1.09 MG/DL — SIGNIFICANT CHANGE UP (ref 0.5–1.3)
CREAT SERPL-MCNC: 1.1 MG/DL — SIGNIFICANT CHANGE UP (ref 0.5–1.3)
CREAT SERPL-MCNC: 1.12 MG/DL — SIGNIFICANT CHANGE UP (ref 0.5–1.3)
CREAT SERPL-MCNC: 1.13 MG/DL — SIGNIFICANT CHANGE UP (ref 0.5–1.3)
CREAT SERPL-MCNC: 1.16 MG/DL — SIGNIFICANT CHANGE UP (ref 0.5–1.3)
CREAT SERPL-MCNC: 1.17 MG/DL — SIGNIFICANT CHANGE UP (ref 0.5–1.3)
CREAT SERPL-MCNC: 1.18 MG/DL — SIGNIFICANT CHANGE UP (ref 0.5–1.3)
CREAT SERPL-MCNC: 1.2 MG/DL — SIGNIFICANT CHANGE UP (ref 0.5–1.3)
CREAT SERPL-MCNC: 1.23 MG/DL — SIGNIFICANT CHANGE UP (ref 0.5–1.3)
CREAT SERPL-MCNC: 1.25 MG/DL — SIGNIFICANT CHANGE UP (ref 0.5–1.3)
CREAT SERPL-MCNC: 1.27 MG/DL — SIGNIFICANT CHANGE UP (ref 0.5–1.3)
CREAT SERPL-MCNC: 1.27 MG/DL — SIGNIFICANT CHANGE UP (ref 0.5–1.3)
CREAT SERPL-MCNC: 1.29 MG/DL — SIGNIFICANT CHANGE UP (ref 0.5–1.3)
CREAT SERPL-MCNC: 1.31 MG/DL — HIGH (ref 0.5–1.3)
CREAT SERPL-MCNC: 1.31 MG/DL — HIGH (ref 0.5–1.3)
CREAT SERPL-MCNC: 1.32 MG/DL — HIGH (ref 0.5–1.3)
CREAT SERPL-MCNC: 1.32 MG/DL — HIGH (ref 0.5–1.3)
CREAT SERPL-MCNC: 1.33 MG/DL — HIGH (ref 0.5–1.3)
CREAT SERPL-MCNC: 1.37 MG/DL — HIGH (ref 0.5–1.3)
CREAT SERPL-MCNC: 1.37 MG/DL — HIGH (ref 0.5–1.3)
CREAT SERPL-MCNC: 1.39 MG/DL — HIGH (ref 0.5–1.3)
CREAT SERPL-MCNC: 1.4 MG/DL — HIGH (ref 0.5–1.3)
CREAT SERPL-MCNC: 1.43 MG/DL — HIGH (ref 0.5–1.3)
CREAT SERPL-MCNC: 1.44 MG/DL — HIGH (ref 0.5–1.3)
CREAT SERPL-MCNC: 1.44 MG/DL — HIGH (ref 0.5–1.3)
CREAT SERPL-MCNC: 1.45 MG/DL — HIGH (ref 0.5–1.3)
CREAT SERPL-MCNC: 1.48 MG/DL — HIGH (ref 0.5–1.3)
CREAT SERPL-MCNC: 1.49 MG/DL — HIGH (ref 0.5–1.3)
CREAT SERPL-MCNC: 1.55 MG/DL — HIGH (ref 0.5–1.3)
CREAT SERPL-MCNC: 1.56 MG/DL — HIGH (ref 0.5–1.3)
CREAT SERPL-MCNC: 1.59 MG/DL — HIGH (ref 0.5–1.3)
CREAT SERPL-MCNC: 1.66 MG/DL — HIGH (ref 0.5–1.3)
CREAT SERPL-MCNC: 1.7 MG/DL — HIGH (ref 0.5–1.3)
CREAT SERPL-MCNC: 1.7 MG/DL — HIGH (ref 0.5–1.3)
CREAT SERPL-MCNC: 1.74 MG/DL — HIGH (ref 0.5–1.3)
CREAT SERPL-MCNC: 1.76 MG/DL — HIGH (ref 0.5–1.3)
CREAT SERPL-MCNC: 1.77 MG/DL — HIGH (ref 0.5–1.3)
CREAT SERPL-MCNC: 1.78 MG/DL — HIGH (ref 0.5–1.3)
CREAT SERPL-MCNC: 1.82 MG/DL — HIGH (ref 0.5–1.3)
CREAT SERPL-MCNC: 1.82 MG/DL — HIGH (ref 0.5–1.3)
CREAT SERPL-MCNC: 1.84 MG/DL — HIGH (ref 0.5–1.3)
CREAT SERPL-MCNC: 1.85 MG/DL — HIGH (ref 0.5–1.3)
CREAT SERPL-MCNC: 1.9 MG/DL — HIGH (ref 0.5–1.3)
CREAT SERPL-MCNC: 1.92 MG/DL — HIGH (ref 0.5–1.3)
CREAT SERPL-MCNC: 1.99 MG/DL — HIGH (ref 0.5–1.3)
CREAT SERPL-MCNC: 2 MG/DL — HIGH (ref 0.5–1.3)
CREAT SERPL-MCNC: 2.02 MG/DL — HIGH (ref 0.5–1.3)
CREAT SERPL-MCNC: 2.06 MG/DL — HIGH (ref 0.5–1.3)
CREAT SERPL-MCNC: 2.19 MG/DL — HIGH (ref 0.5–1.3)
CREAT SERPL-MCNC: 2.31 MG/DL — HIGH (ref 0.5–1.3)
CREAT SERPL-MCNC: 2.36 MG/DL — HIGH (ref 0.5–1.3)
CREAT SERPL-MCNC: 2.4 MG/DL — HIGH (ref 0.5–1.3)
CREAT SERPL-MCNC: 2.45 MG/DL — HIGH (ref 0.5–1.3)
CREAT SERPL-MCNC: 2.47 MG/DL — HIGH (ref 0.5–1.3)
CREAT SERPL-MCNC: 2.56 MG/DL — HIGH (ref 0.5–1.3)
CREAT SERPL-MCNC: 2.59 MG/DL — HIGH (ref 0.5–1.3)
CREAT SERPL-MCNC: 2.6 MG/DL — HIGH (ref 0.5–1.3)
CREAT SERPL-MCNC: 2.74 MG/DL — HIGH (ref 0.5–1.3)
CREAT SERPL-MCNC: 3.01 MG/DL — HIGH (ref 0.5–1.3)
CREAT SERPL-MCNC: <0.3 MG/DL — LOW (ref 0.5–1.3)
CRP SERPL-MCNC: 40 MG/L — HIGH (ref 0–4)
CRP SERPL-MCNC: 42 MG/L — HIGH (ref 0–4)
CRP SERPL-MCNC: 51 MG/L — HIGH (ref 0–4)
CRYPTOC AG FLD QL: NEGATIVE — SIGNIFICANT CHANGE UP
CULTURE RESULTS: ABNORMAL
CULTURE RESULTS: SIGNIFICANT CHANGE UP
CYCLOSPORINE SER-MCNC: 111 NG/ML — LOW (ref 150–400)
CYCLOSPORINE SER-MCNC: 129 NG/ML — LOW (ref 150–400)
CYCLOSPORINE SER-MCNC: 132 NG/ML — LOW (ref 150–400)
CYCLOSPORINE SER-MCNC: 32 NG/ML — LOW (ref 150–400)
CYCLOSPORINE SER-MCNC: 32 NG/ML — LOW (ref 150–400)
CYCLOSPORINE SER-MCNC: 35 NG/ML — LOW (ref 150–400)
CYCLOSPORINE SER-MCNC: 36 NG/ML — LOW (ref 150–400)
CYCLOSPORINE SER-MCNC: 36 NG/ML — LOW (ref 150–400)
CYCLOSPORINE SER-MCNC: 37 NG/ML — LOW (ref 150–400)
CYCLOSPORINE SER-MCNC: 38 NG/ML — LOW (ref 150–400)
CYCLOSPORINE SER-MCNC: 41 NG/ML — LOW (ref 150–400)
CYCLOSPORINE SER-MCNC: 43 NG/ML — LOW (ref 150–400)
CYCLOSPORINE SER-MCNC: 44 NG/ML — LOW (ref 150–400)
CYCLOSPORINE SER-MCNC: 45 NG/ML — LOW (ref 150–400)
CYCLOSPORINE SER-MCNC: 46 NG/ML — LOW (ref 150–400)
CYCLOSPORINE SER-MCNC: 46 NG/ML — LOW (ref 150–400)
CYCLOSPORINE SER-MCNC: 49 NG/ML — LOW (ref 150–400)
CYCLOSPORINE SER-MCNC: 54 NG/ML — LOW (ref 150–400)
CYCLOSPORINE SER-MCNC: 54 NG/ML — LOW (ref 150–400)
CYCLOSPORINE SER-MCNC: 58 NG/ML — LOW (ref 150–400)
CYCLOSPORINE SER-MCNC: 59 NG/ML — LOW (ref 150–400)
CYCLOSPORINE SER-MCNC: 71 NG/ML — LOW (ref 150–400)
CYCLOSPORINE SER-MCNC: 73 NG/ML — LOW (ref 150–400)
CYCLOSPORINE SER-MCNC: 74 NG/ML — LOW (ref 150–400)
CYCLOSPORINE SER-MCNC: 78 NG/ML — LOW (ref 150–400)
CYCLOSPORINE SER-MCNC: 80 NG/ML — LOW (ref 150–400)
CYCLOSPORINE SER-MCNC: 82 NG/ML — LOW (ref 150–400)
CYCLOSPORINE SER-MCNC: 96 NG/ML — LOW (ref 150–400)
CYCLOSPORINE SER-MCNC: <25 NG/ML — LOW (ref 150–400)
DACRYOCYTES BLD QL SMEAR: SLIGHT — SIGNIFICANT CHANGE UP
DIFF PNL FLD: ABNORMAL
DIFF PNL FLD: NEGATIVE — SIGNIFICANT CHANGE UP
DIGOXIN SERPL-MCNC: 0.9 NG/ML — SIGNIFICANT CHANGE UP (ref 0.8–2)
DIGOXIN SERPL-MCNC: 1 NG/ML — SIGNIFICANT CHANGE UP (ref 0.8–2)
E CHAFFEENSIS DNA BLD QL NAA+PROBE: NEGATIVE — SIGNIFICANT CHANGE UP
E COLI DNA BLD POS QL NAA+NON-PROBE: SIGNIFICANT CHANGE UP
E EWINGII DNA SPEC QL NAA+PROBE: NEGATIVE — SIGNIFICANT CHANGE UP
EBV EA AB SER IA-ACNC: <5 U/ML — SIGNIFICANT CHANGE UP
EBV EA AB TITR SER IF: POSITIVE
EBV EA IGG SER-ACNC: NEGATIVE — SIGNIFICANT CHANGE UP
EBV NA IGG SER IA-ACNC: 441 U/ML — HIGH
EBV PATRN SPEC IB-IMP: SIGNIFICANT CHANGE UP
EBV VCA IGG AVIDITY SER QL IA: POSITIVE
EBV VCA IGM SER IA-ACNC: 177 U/ML — HIGH
EBV VCA IGM SER IA-ACNC: <10 U/ML — SIGNIFICANT CHANGE UP
EBV VCA IGM TITR FLD: NEGATIVE — SIGNIFICANT CHANGE UP
EGFR: 100 ML/MIN/1.73M2 — SIGNIFICANT CHANGE UP
EGFR: 101 ML/MIN/1.73M2 — SIGNIFICANT CHANGE UP
EGFR: 101 ML/MIN/1.73M2 — SIGNIFICANT CHANGE UP
EGFR: 102 ML/MIN/1.73M2 — SIGNIFICANT CHANGE UP
EGFR: 105 ML/MIN/1.73M2 — SIGNIFICANT CHANGE UP
EGFR: 106 ML/MIN/1.73M2 — SIGNIFICANT CHANGE UP
EGFR: 106 ML/MIN/1.73M2 — SIGNIFICANT CHANGE UP
EGFR: 108 ML/MIN/1.73M2 — SIGNIFICANT CHANGE UP
EGFR: 110 ML/MIN/1.73M2 — SIGNIFICANT CHANGE UP
EGFR: 113 ML/MIN/1.73M2 — SIGNIFICANT CHANGE UP
EGFR: 114 ML/MIN/1.73M2 — SIGNIFICANT CHANGE UP
EGFR: 115 ML/MIN/1.73M2 — SIGNIFICANT CHANGE UP
EGFR: 116 ML/MIN/1.73M2 — SIGNIFICANT CHANGE UP
EGFR: 117 ML/MIN/1.73M2 — SIGNIFICANT CHANGE UP
EGFR: 117 ML/MIN/1.73M2 — SIGNIFICANT CHANGE UP
EGFR: 118 ML/MIN/1.73M2 — SIGNIFICANT CHANGE UP
EGFR: 118 ML/MIN/1.73M2 — SIGNIFICANT CHANGE UP
EGFR: 119 ML/MIN/1.73M2 — SIGNIFICANT CHANGE UP
EGFR: 120 ML/MIN/1.73M2 — SIGNIFICANT CHANGE UP
EGFR: 122 ML/MIN/1.73M2 — SIGNIFICANT CHANGE UP
EGFR: 122 ML/MIN/1.73M2 — SIGNIFICANT CHANGE UP
EGFR: 125 ML/MIN/1.73M2 — SIGNIFICANT CHANGE UP
EGFR: 125 ML/MIN/1.73M2 — SIGNIFICANT CHANGE UP
EGFR: 21 ML/MIN/1.73M2 — LOW
EGFR: 24 ML/MIN/1.73M2 — LOW
EGFR: 25 ML/MIN/1.73M2 — LOW
EGFR: 25 ML/MIN/1.73M2 — LOW
EGFR: 26 ML/MIN/1.73M2 — LOW
EGFR: 27 ML/MIN/1.73M2 — LOW
EGFR: 27 ML/MIN/1.73M2 — LOW
EGFR: 28 ML/MIN/1.73M2 — LOW
EGFR: 28 ML/MIN/1.73M2 — LOW
EGFR: 29 ML/MIN/1.73M2 — LOW
EGFR: 31 ML/MIN/1.73M2 — LOW
EGFR: 33 ML/MIN/1.73M2 — LOW
EGFR: 34 ML/MIN/1.73M2 — LOW
EGFR: 34 ML/MIN/1.73M2 — LOW
EGFR: 35 ML/MIN/1.73M2 — LOW
EGFR: 36 ML/MIN/1.73M2 — LOW
EGFR: 37 ML/MIN/1.73M2 — LOW
EGFR: 38 ML/MIN/1.73M2 — LOW
EGFR: 40 ML/MIN/1.73M2 — LOW
EGFR: 41 ML/MIN/1.73M2 — LOW
EGFR: 42 ML/MIN/1.73M2 — LOW
EGFR: 42 ML/MIN/1.73M2 — LOW
EGFR: 43 ML/MIN/1.73M2 — LOW
EGFR: 45 ML/MIN/1.73M2 — LOW
EGFR: 46 ML/MIN/1.73M2 — LOW
EGFR: 47 ML/MIN/1.73M2 — LOW
EGFR: 49 ML/MIN/1.73M2 — LOW
EGFR: 49 ML/MIN/1.73M2 — LOW
EGFR: 51 ML/MIN/1.73M2 — LOW
EGFR: 53 ML/MIN/1.73M2 — LOW
EGFR: 53 ML/MIN/1.73M2 — LOW
EGFR: 54 ML/MIN/1.73M2 — LOW
EGFR: 54 ML/MIN/1.73M2 — LOW
EGFR: 56 ML/MIN/1.73M2 — LOW
EGFR: 57 ML/MIN/1.73M2 — LOW
EGFR: 58 ML/MIN/1.73M2 — LOW
EGFR: 59 ML/MIN/1.73M2 — LOW
EGFR: 59 ML/MIN/1.73M2 — LOW
EGFR: 60 ML/MIN/1.73M2 — SIGNIFICANT CHANGE UP
EGFR: 62 ML/MIN/1.73M2 — SIGNIFICANT CHANGE UP
EGFR: 63 ML/MIN/1.73M2 — SIGNIFICANT CHANGE UP
EGFR: 65 ML/MIN/1.73M2 — SIGNIFICANT CHANGE UP
EGFR: 66 ML/MIN/1.73M2 — SIGNIFICANT CHANGE UP
EGFR: 68 ML/MIN/1.73M2 — SIGNIFICANT CHANGE UP
EGFR: 69 ML/MIN/1.73M2 — SIGNIFICANT CHANGE UP
EGFR: 70 ML/MIN/1.73M2 — SIGNIFICANT CHANGE UP
EGFR: 71 ML/MIN/1.73M2 — SIGNIFICANT CHANGE UP
EGFR: 72 ML/MIN/1.73M2 — SIGNIFICANT CHANGE UP
EGFR: 72 ML/MIN/1.73M2 — SIGNIFICANT CHANGE UP
EGFR: 73 ML/MIN/1.73M2 — SIGNIFICANT CHANGE UP
EGFR: 74 ML/MIN/1.73M2 — SIGNIFICANT CHANGE UP
EGFR: 76 ML/MIN/1.73M2 — SIGNIFICANT CHANGE UP
EGFR: 77 ML/MIN/1.73M2 — SIGNIFICANT CHANGE UP
EGFR: 78 ML/MIN/1.73M2 — SIGNIFICANT CHANGE UP
EGFR: 81 ML/MIN/1.73M2 — SIGNIFICANT CHANGE UP
EGFR: 82 ML/MIN/1.73M2 — SIGNIFICANT CHANGE UP
EGFR: 88 ML/MIN/1.73M2 — SIGNIFICANT CHANGE UP
EGFR: 90 ML/MIN/1.73M2 — SIGNIFICANT CHANGE UP
EGFR: 90 ML/MIN/1.73M2 — SIGNIFICANT CHANGE UP
EGFR: 91 ML/MIN/1.73M2 — SIGNIFICANT CHANGE UP
EGFR: 91 ML/MIN/1.73M2 — SIGNIFICANT CHANGE UP
EGFR: 93 ML/MIN/1.73M2 — SIGNIFICANT CHANGE UP
EGFR: 93 ML/MIN/1.73M2 — SIGNIFICANT CHANGE UP
EGFR: 94 ML/MIN/1.73M2 — SIGNIFICANT CHANGE UP
EGFR: 95 ML/MIN/1.73M2 — SIGNIFICANT CHANGE UP
EGFR: 95 ML/MIN/1.73M2 — SIGNIFICANT CHANGE UP
EGFR: 96 ML/MIN/1.73M2 — SIGNIFICANT CHANGE UP
EGFR: 98 ML/MIN/1.73M2 — SIGNIFICANT CHANGE UP
EGFR: 99 ML/MIN/1.73M2 — SIGNIFICANT CHANGE UP
EHRLICHIA DNA SPEC QL NAA+PROBE: NEGATIVE — SIGNIFICANT CHANGE UP
ELLIPTOCYTES BLD QL SMEAR: SLIGHT — SIGNIFICANT CHANGE UP
EOSINOPHIL # BLD AUTO: 0 K/UL — SIGNIFICANT CHANGE UP (ref 0–0.5)
EOSINOPHIL # BLD AUTO: 0.01 K/UL — SIGNIFICANT CHANGE UP (ref 0–0.5)
EOSINOPHIL # BLD AUTO: 0.03 K/UL — SIGNIFICANT CHANGE UP (ref 0–0.5)
EOSINOPHIL NFR BLD AUTO: 0 % — SIGNIFICANT CHANGE UP (ref 0–6)
EOSINOPHIL NFR BLD AUTO: 0.1 % — SIGNIFICANT CHANGE UP (ref 0–6)
EOSINOPHIL NFR BLD AUTO: 0.1 % — SIGNIFICANT CHANGE UP (ref 0–6)
EOSINOPHIL NFR BLD AUTO: 0.2 % — SIGNIFICANT CHANGE UP (ref 0–6)
EOSINOPHIL NFR BLD AUTO: 0.9 % — SIGNIFICANT CHANGE UP (ref 0–6)
EOSINOPHIL NFR BLD AUTO: 1 % — SIGNIFICANT CHANGE UP (ref 0–6)
FIBRINOGEN PPP-MCNC: 102 MG/DL — LOW (ref 200–445)
FIBRINOGEN PPP-MCNC: 120 MG/DL — LOW (ref 200–445)
FIBRINOGEN PPP-MCNC: 125 MG/DL — LOW (ref 200–445)
FIBRINOGEN PPP-MCNC: 136 MG/DL — LOW (ref 200–445)
FIBRINOGEN PPP-MCNC: 141 MG/DL — LOW (ref 200–445)
FIBRINOGEN PPP-MCNC: 143 MG/DL — LOW (ref 200–445)
FIBRINOGEN PPP-MCNC: 156 MG/DL — LOW (ref 200–445)
FIBRINOGEN PPP-MCNC: 159 MG/DL — LOW (ref 200–445)
FIBRINOGEN PPP-MCNC: 165 MG/DL — LOW (ref 200–445)
FIBRINOGEN PPP-MCNC: 167 MG/DL — LOW (ref 200–445)
FIBRINOGEN PPP-MCNC: 180 MG/DL — LOW (ref 200–445)
FIBRINOGEN PPP-MCNC: 181 MG/DL — LOW (ref 200–445)
FIBRINOGEN PPP-MCNC: 190 MG/DL — LOW (ref 200–445)
FIBRINOGEN PPP-MCNC: 191 MG/DL — LOW (ref 200–445)
FIBRINOGEN PPP-MCNC: 196 MG/DL — LOW (ref 200–445)
FIBRINOGEN PPP-MCNC: 199 MG/DL — LOW (ref 200–445)
FIBRINOGEN PPP-MCNC: 202 MG/DL — SIGNIFICANT CHANGE UP (ref 200–445)
FIBRINOGEN PPP-MCNC: 206 MG/DL — SIGNIFICANT CHANGE UP (ref 200–445)
FIBRINOGEN PPP-MCNC: 211 MG/DL — SIGNIFICANT CHANGE UP (ref 200–445)
FIBRINOGEN PPP-MCNC: 211 MG/DL — SIGNIFICANT CHANGE UP (ref 200–445)
FIBRINOGEN PPP-MCNC: 213 MG/DL — SIGNIFICANT CHANGE UP (ref 200–445)
FIBRINOGEN PPP-MCNC: 227 MG/DL — SIGNIFICANT CHANGE UP (ref 200–445)
FIBRINOGEN PPP-MCNC: 69 MG/DL — CRITICAL LOW (ref 200–445)
FIBRINOGEN PPP-MCNC: 88 MG/DL — CRITICAL LOW (ref 200–445)
FIBRINOGEN PPP-MCNC: 89 MG/DL — CRITICAL LOW (ref 200–445)
FIBRINOGEN PPP-MCNC: 95 MG/DL — CRITICAL LOW (ref 200–445)
FIBRINOGEN PPP-MCNC: 98 MG/DL — CRITICAL LOW (ref 200–445)
FLUAV AG NPH QL: SIGNIFICANT CHANGE UP
FLUBV AG NPH QL: SIGNIFICANT CHANGE UP
FUNGITELL B-D-GLUCAN,  BRONCHIAL LAVAGE: SIGNIFICANT CHANGE UP
FUNGITELL: 188 PG/ML — HIGH
FUNGITELL: <31 PG/ML — SIGNIFICANT CHANGE UP
GALACTOMANNAN AG SERPL-ACNC: 0.04 INDEX — SIGNIFICANT CHANGE UP (ref 0–0.49)
GALACTOMANNAN AG SERPL-ACNC: 0.1 INDEX — SIGNIFICANT CHANGE UP (ref 0–0.49)
GALACTOMANNAN AG SERPL-ACNC: 0.28 INDEX — SIGNIFICANT CHANGE UP (ref 0–0.49)
GAS PNL BLDA: SIGNIFICANT CHANGE UP
GAS PNL BLDMV: SIGNIFICANT CHANGE UP
GAS PNL BLDV: SIGNIFICANT CHANGE UP
GGT SERPL-CCNC: 473 U/L — HIGH (ref 9–50)
GI PCR PANEL: SIGNIFICANT CHANGE UP
GIANT PLATELETS BLD QL SMEAR: PRESENT — SIGNIFICANT CHANGE UP
GLUCOSE BLDC GLUCOMTR-MCNC: 100 MG/DL — HIGH (ref 70–99)
GLUCOSE BLDC GLUCOMTR-MCNC: 101 MG/DL — HIGH (ref 70–99)
GLUCOSE BLDC GLUCOMTR-MCNC: 101 MG/DL — HIGH (ref 70–99)
GLUCOSE BLDC GLUCOMTR-MCNC: 102 MG/DL — HIGH (ref 70–99)
GLUCOSE BLDC GLUCOMTR-MCNC: 102 MG/DL — HIGH (ref 70–99)
GLUCOSE BLDC GLUCOMTR-MCNC: 103 MG/DL — HIGH (ref 70–99)
GLUCOSE BLDC GLUCOMTR-MCNC: 103 MG/DL — HIGH (ref 70–99)
GLUCOSE BLDC GLUCOMTR-MCNC: 104 MG/DL — HIGH (ref 70–99)
GLUCOSE BLDC GLUCOMTR-MCNC: 105 MG/DL — HIGH (ref 70–99)
GLUCOSE BLDC GLUCOMTR-MCNC: 106 MG/DL — HIGH (ref 70–99)
GLUCOSE BLDC GLUCOMTR-MCNC: 107 MG/DL — HIGH (ref 70–99)
GLUCOSE BLDC GLUCOMTR-MCNC: 107 MG/DL — HIGH (ref 70–99)
GLUCOSE BLDC GLUCOMTR-MCNC: 108 MG/DL — HIGH (ref 70–99)
GLUCOSE BLDC GLUCOMTR-MCNC: 108 MG/DL — HIGH (ref 70–99)
GLUCOSE BLDC GLUCOMTR-MCNC: 110 MG/DL — HIGH (ref 70–99)
GLUCOSE BLDC GLUCOMTR-MCNC: 111 MG/DL — HIGH (ref 70–99)
GLUCOSE BLDC GLUCOMTR-MCNC: 111 MG/DL — HIGH (ref 70–99)
GLUCOSE BLDC GLUCOMTR-MCNC: 114 MG/DL — HIGH (ref 70–99)
GLUCOSE BLDC GLUCOMTR-MCNC: 114 MG/DL — HIGH (ref 70–99)
GLUCOSE BLDC GLUCOMTR-MCNC: 115 MG/DL — HIGH (ref 70–99)
GLUCOSE BLDC GLUCOMTR-MCNC: 116 MG/DL — HIGH (ref 70–99)
GLUCOSE BLDC GLUCOMTR-MCNC: 117 MG/DL — HIGH (ref 70–99)
GLUCOSE BLDC GLUCOMTR-MCNC: 118 MG/DL — HIGH (ref 70–99)
GLUCOSE BLDC GLUCOMTR-MCNC: 119 MG/DL — HIGH (ref 70–99)
GLUCOSE BLDC GLUCOMTR-MCNC: 119 MG/DL — HIGH (ref 70–99)
GLUCOSE BLDC GLUCOMTR-MCNC: 120 MG/DL — HIGH (ref 70–99)
GLUCOSE BLDC GLUCOMTR-MCNC: 120 MG/DL — HIGH (ref 70–99)
GLUCOSE BLDC GLUCOMTR-MCNC: 121 MG/DL — HIGH (ref 70–99)
GLUCOSE BLDC GLUCOMTR-MCNC: 122 MG/DL — HIGH (ref 70–99)
GLUCOSE BLDC GLUCOMTR-MCNC: 123 MG/DL — HIGH (ref 70–99)
GLUCOSE BLDC GLUCOMTR-MCNC: 124 MG/DL — HIGH (ref 70–99)
GLUCOSE BLDC GLUCOMTR-MCNC: 124 MG/DL — HIGH (ref 70–99)
GLUCOSE BLDC GLUCOMTR-MCNC: 125 MG/DL — HIGH (ref 70–99)
GLUCOSE BLDC GLUCOMTR-MCNC: 126 MG/DL — HIGH (ref 70–99)
GLUCOSE BLDC GLUCOMTR-MCNC: 127 MG/DL — HIGH (ref 70–99)
GLUCOSE BLDC GLUCOMTR-MCNC: 128 MG/DL — HIGH (ref 70–99)
GLUCOSE BLDC GLUCOMTR-MCNC: 129 MG/DL — HIGH (ref 70–99)
GLUCOSE BLDC GLUCOMTR-MCNC: 130 MG/DL — HIGH (ref 70–99)
GLUCOSE BLDC GLUCOMTR-MCNC: 132 MG/DL — HIGH (ref 70–99)
GLUCOSE BLDC GLUCOMTR-MCNC: 134 MG/DL — HIGH (ref 70–99)
GLUCOSE BLDC GLUCOMTR-MCNC: 134 MG/DL — HIGH (ref 70–99)
GLUCOSE BLDC GLUCOMTR-MCNC: 135 MG/DL — HIGH (ref 70–99)
GLUCOSE BLDC GLUCOMTR-MCNC: 135 MG/DL — HIGH (ref 70–99)
GLUCOSE BLDC GLUCOMTR-MCNC: 136 MG/DL — HIGH (ref 70–99)
GLUCOSE BLDC GLUCOMTR-MCNC: 136 MG/DL — HIGH (ref 70–99)
GLUCOSE BLDC GLUCOMTR-MCNC: 137 MG/DL — HIGH (ref 70–99)
GLUCOSE BLDC GLUCOMTR-MCNC: 138 MG/DL — HIGH (ref 70–99)
GLUCOSE BLDC GLUCOMTR-MCNC: 139 MG/DL — HIGH (ref 70–99)
GLUCOSE BLDC GLUCOMTR-MCNC: 140 MG/DL — HIGH (ref 70–99)
GLUCOSE BLDC GLUCOMTR-MCNC: 141 MG/DL — HIGH (ref 70–99)
GLUCOSE BLDC GLUCOMTR-MCNC: 142 MG/DL — HIGH (ref 70–99)
GLUCOSE BLDC GLUCOMTR-MCNC: 143 MG/DL — HIGH (ref 70–99)
GLUCOSE BLDC GLUCOMTR-MCNC: 144 MG/DL — HIGH (ref 70–99)
GLUCOSE BLDC GLUCOMTR-MCNC: 145 MG/DL — HIGH (ref 70–99)
GLUCOSE BLDC GLUCOMTR-MCNC: 146 MG/DL — HIGH (ref 70–99)
GLUCOSE BLDC GLUCOMTR-MCNC: 148 MG/DL — HIGH (ref 70–99)
GLUCOSE BLDC GLUCOMTR-MCNC: 148 MG/DL — HIGH (ref 70–99)
GLUCOSE BLDC GLUCOMTR-MCNC: 149 MG/DL — HIGH (ref 70–99)
GLUCOSE BLDC GLUCOMTR-MCNC: 150 MG/DL — HIGH (ref 70–99)
GLUCOSE BLDC GLUCOMTR-MCNC: 151 MG/DL — HIGH (ref 70–99)
GLUCOSE BLDC GLUCOMTR-MCNC: 151 MG/DL — HIGH (ref 70–99)
GLUCOSE BLDC GLUCOMTR-MCNC: 152 MG/DL — HIGH (ref 70–99)
GLUCOSE BLDC GLUCOMTR-MCNC: 153 MG/DL — HIGH (ref 70–99)
GLUCOSE BLDC GLUCOMTR-MCNC: 153 MG/DL — HIGH (ref 70–99)
GLUCOSE BLDC GLUCOMTR-MCNC: 154 MG/DL — HIGH (ref 70–99)
GLUCOSE BLDC GLUCOMTR-MCNC: 154 MG/DL — HIGH (ref 70–99)
GLUCOSE BLDC GLUCOMTR-MCNC: 156 MG/DL — HIGH (ref 70–99)
GLUCOSE BLDC GLUCOMTR-MCNC: 157 MG/DL — HIGH (ref 70–99)
GLUCOSE BLDC GLUCOMTR-MCNC: 158 MG/DL — HIGH (ref 70–99)
GLUCOSE BLDC GLUCOMTR-MCNC: 159 MG/DL — HIGH (ref 70–99)
GLUCOSE BLDC GLUCOMTR-MCNC: 159 MG/DL — HIGH (ref 70–99)
GLUCOSE BLDC GLUCOMTR-MCNC: 160 MG/DL — HIGH (ref 70–99)
GLUCOSE BLDC GLUCOMTR-MCNC: 160 MG/DL — HIGH (ref 70–99)
GLUCOSE BLDC GLUCOMTR-MCNC: 161 MG/DL — HIGH (ref 70–99)
GLUCOSE BLDC GLUCOMTR-MCNC: 162 MG/DL — HIGH (ref 70–99)
GLUCOSE BLDC GLUCOMTR-MCNC: 163 MG/DL — HIGH (ref 70–99)
GLUCOSE BLDC GLUCOMTR-MCNC: 164 MG/DL — HIGH (ref 70–99)
GLUCOSE BLDC GLUCOMTR-MCNC: 165 MG/DL — HIGH (ref 70–99)
GLUCOSE BLDC GLUCOMTR-MCNC: 166 MG/DL — HIGH (ref 70–99)
GLUCOSE BLDC GLUCOMTR-MCNC: 166 MG/DL — HIGH (ref 70–99)
GLUCOSE BLDC GLUCOMTR-MCNC: 167 MG/DL — HIGH (ref 70–99)
GLUCOSE BLDC GLUCOMTR-MCNC: 167 MG/DL — HIGH (ref 70–99)
GLUCOSE BLDC GLUCOMTR-MCNC: 169 MG/DL — HIGH (ref 70–99)
GLUCOSE BLDC GLUCOMTR-MCNC: 169 MG/DL — HIGH (ref 70–99)
GLUCOSE BLDC GLUCOMTR-MCNC: 170 MG/DL — HIGH (ref 70–99)
GLUCOSE BLDC GLUCOMTR-MCNC: 170 MG/DL — HIGH (ref 70–99)
GLUCOSE BLDC GLUCOMTR-MCNC: 171 MG/DL — HIGH (ref 70–99)
GLUCOSE BLDC GLUCOMTR-MCNC: 172 MG/DL — HIGH (ref 70–99)
GLUCOSE BLDC GLUCOMTR-MCNC: 173 MG/DL — HIGH (ref 70–99)
GLUCOSE BLDC GLUCOMTR-MCNC: 173 MG/DL — HIGH (ref 70–99)
GLUCOSE BLDC GLUCOMTR-MCNC: 174 MG/DL — HIGH (ref 70–99)
GLUCOSE BLDC GLUCOMTR-MCNC: 174 MG/DL — HIGH (ref 70–99)
GLUCOSE BLDC GLUCOMTR-MCNC: 175 MG/DL — HIGH (ref 70–99)
GLUCOSE BLDC GLUCOMTR-MCNC: 175 MG/DL — HIGH (ref 70–99)
GLUCOSE BLDC GLUCOMTR-MCNC: 178 MG/DL — HIGH (ref 70–99)
GLUCOSE BLDC GLUCOMTR-MCNC: 179 MG/DL — HIGH (ref 70–99)
GLUCOSE BLDC GLUCOMTR-MCNC: 180 MG/DL — HIGH (ref 70–99)
GLUCOSE BLDC GLUCOMTR-MCNC: 182 MG/DL — HIGH (ref 70–99)
GLUCOSE BLDC GLUCOMTR-MCNC: 184 MG/DL — HIGH (ref 70–99)
GLUCOSE BLDC GLUCOMTR-MCNC: 185 MG/DL — HIGH (ref 70–99)
GLUCOSE BLDC GLUCOMTR-MCNC: 186 MG/DL — HIGH (ref 70–99)
GLUCOSE BLDC GLUCOMTR-MCNC: 187 MG/DL — HIGH (ref 70–99)
GLUCOSE BLDC GLUCOMTR-MCNC: 191 MG/DL — HIGH (ref 70–99)
GLUCOSE BLDC GLUCOMTR-MCNC: 192 MG/DL — HIGH (ref 70–99)
GLUCOSE BLDC GLUCOMTR-MCNC: 192 MG/DL — HIGH (ref 70–99)
GLUCOSE BLDC GLUCOMTR-MCNC: 194 MG/DL — HIGH (ref 70–99)
GLUCOSE BLDC GLUCOMTR-MCNC: 195 MG/DL — HIGH (ref 70–99)
GLUCOSE BLDC GLUCOMTR-MCNC: 195 MG/DL — HIGH (ref 70–99)
GLUCOSE BLDC GLUCOMTR-MCNC: 196 MG/DL — HIGH (ref 70–99)
GLUCOSE BLDC GLUCOMTR-MCNC: 197 MG/DL — HIGH (ref 70–99)
GLUCOSE BLDC GLUCOMTR-MCNC: 200 MG/DL — HIGH (ref 70–99)
GLUCOSE BLDC GLUCOMTR-MCNC: 201 MG/DL — HIGH (ref 70–99)
GLUCOSE BLDC GLUCOMTR-MCNC: 201 MG/DL — HIGH (ref 70–99)
GLUCOSE BLDC GLUCOMTR-MCNC: 202 MG/DL — HIGH (ref 70–99)
GLUCOSE BLDC GLUCOMTR-MCNC: 203 MG/DL — HIGH (ref 70–99)
GLUCOSE BLDC GLUCOMTR-MCNC: 203 MG/DL — HIGH (ref 70–99)
GLUCOSE BLDC GLUCOMTR-MCNC: 204 MG/DL — HIGH (ref 70–99)
GLUCOSE BLDC GLUCOMTR-MCNC: 206 MG/DL — HIGH (ref 70–99)
GLUCOSE BLDC GLUCOMTR-MCNC: 207 MG/DL — HIGH (ref 70–99)
GLUCOSE BLDC GLUCOMTR-MCNC: 207 MG/DL — HIGH (ref 70–99)
GLUCOSE BLDC GLUCOMTR-MCNC: 209 MG/DL — HIGH (ref 70–99)
GLUCOSE BLDC GLUCOMTR-MCNC: 209 MG/DL — HIGH (ref 70–99)
GLUCOSE BLDC GLUCOMTR-MCNC: 210 MG/DL — HIGH (ref 70–99)
GLUCOSE BLDC GLUCOMTR-MCNC: 211 MG/DL — HIGH (ref 70–99)
GLUCOSE BLDC GLUCOMTR-MCNC: 213 MG/DL — HIGH (ref 70–99)
GLUCOSE BLDC GLUCOMTR-MCNC: 215 MG/DL — HIGH (ref 70–99)
GLUCOSE BLDC GLUCOMTR-MCNC: 218 MG/DL — HIGH (ref 70–99)
GLUCOSE BLDC GLUCOMTR-MCNC: 218 MG/DL — HIGH (ref 70–99)
GLUCOSE BLDC GLUCOMTR-MCNC: 219 MG/DL — HIGH (ref 70–99)
GLUCOSE BLDC GLUCOMTR-MCNC: 222 MG/DL — HIGH (ref 70–99)
GLUCOSE BLDC GLUCOMTR-MCNC: 227 MG/DL — HIGH (ref 70–99)
GLUCOSE BLDC GLUCOMTR-MCNC: 227 MG/DL — HIGH (ref 70–99)
GLUCOSE BLDC GLUCOMTR-MCNC: 228 MG/DL — HIGH (ref 70–99)
GLUCOSE BLDC GLUCOMTR-MCNC: 229 MG/DL — HIGH (ref 70–99)
GLUCOSE BLDC GLUCOMTR-MCNC: 230 MG/DL — HIGH (ref 70–99)
GLUCOSE BLDC GLUCOMTR-MCNC: 235 MG/DL — HIGH (ref 70–99)
GLUCOSE BLDC GLUCOMTR-MCNC: 235 MG/DL — HIGH (ref 70–99)
GLUCOSE BLDC GLUCOMTR-MCNC: 236 MG/DL — HIGH (ref 70–99)
GLUCOSE BLDC GLUCOMTR-MCNC: 238 MG/DL — HIGH (ref 70–99)
GLUCOSE BLDC GLUCOMTR-MCNC: 238 MG/DL — HIGH (ref 70–99)
GLUCOSE BLDC GLUCOMTR-MCNC: 239 MG/DL — HIGH (ref 70–99)
GLUCOSE BLDC GLUCOMTR-MCNC: 239 MG/DL — HIGH (ref 70–99)
GLUCOSE BLDC GLUCOMTR-MCNC: 240 MG/DL — HIGH (ref 70–99)
GLUCOSE BLDC GLUCOMTR-MCNC: 243 MG/DL — HIGH (ref 70–99)
GLUCOSE BLDC GLUCOMTR-MCNC: 246 MG/DL — HIGH (ref 70–99)
GLUCOSE BLDC GLUCOMTR-MCNC: 251 MG/DL — HIGH (ref 70–99)
GLUCOSE BLDC GLUCOMTR-MCNC: 254 MG/DL — HIGH (ref 70–99)
GLUCOSE BLDC GLUCOMTR-MCNC: 257 MG/DL — HIGH (ref 70–99)
GLUCOSE BLDC GLUCOMTR-MCNC: 259 MG/DL — HIGH (ref 70–99)
GLUCOSE BLDC GLUCOMTR-MCNC: 266 MG/DL — HIGH (ref 70–99)
GLUCOSE BLDC GLUCOMTR-MCNC: 267 MG/DL — HIGH (ref 70–99)
GLUCOSE BLDC GLUCOMTR-MCNC: 267 MG/DL — HIGH (ref 70–99)
GLUCOSE BLDC GLUCOMTR-MCNC: 269 MG/DL — HIGH (ref 70–99)
GLUCOSE BLDC GLUCOMTR-MCNC: 273 MG/DL — HIGH (ref 70–99)
GLUCOSE BLDC GLUCOMTR-MCNC: 273 MG/DL — HIGH (ref 70–99)
GLUCOSE BLDC GLUCOMTR-MCNC: 274 MG/DL — HIGH (ref 70–99)
GLUCOSE BLDC GLUCOMTR-MCNC: 274 MG/DL — HIGH (ref 70–99)
GLUCOSE BLDC GLUCOMTR-MCNC: 276 MG/DL — HIGH (ref 70–99)
GLUCOSE BLDC GLUCOMTR-MCNC: 284 MG/DL — HIGH (ref 70–99)
GLUCOSE BLDC GLUCOMTR-MCNC: 289 MG/DL — HIGH (ref 70–99)
GLUCOSE BLDC GLUCOMTR-MCNC: 289 MG/DL — HIGH (ref 70–99)
GLUCOSE BLDC GLUCOMTR-MCNC: 297 MG/DL — HIGH (ref 70–99)
GLUCOSE BLDC GLUCOMTR-MCNC: 297 MG/DL — HIGH (ref 70–99)
GLUCOSE BLDC GLUCOMTR-MCNC: 300 MG/DL — HIGH (ref 70–99)
GLUCOSE BLDC GLUCOMTR-MCNC: 301 MG/DL — HIGH (ref 70–99)
GLUCOSE BLDC GLUCOMTR-MCNC: 301 MG/DL — HIGH (ref 70–99)
GLUCOSE BLDC GLUCOMTR-MCNC: 305 MG/DL — HIGH (ref 70–99)
GLUCOSE BLDC GLUCOMTR-MCNC: 309 MG/DL — HIGH (ref 70–99)
GLUCOSE BLDC GLUCOMTR-MCNC: 318 MG/DL — HIGH (ref 70–99)
GLUCOSE BLDC GLUCOMTR-MCNC: 332 MG/DL — HIGH (ref 70–99)
GLUCOSE BLDC GLUCOMTR-MCNC: 41 MG/DL — CRITICAL LOW (ref 70–99)
GLUCOSE BLDC GLUCOMTR-MCNC: 508 MG/DL — CRITICAL HIGH (ref 70–99)
GLUCOSE BLDC GLUCOMTR-MCNC: 53 MG/DL — CRITICAL LOW (ref 70–99)
GLUCOSE BLDC GLUCOMTR-MCNC: 54 MG/DL — CRITICAL LOW (ref 70–99)
GLUCOSE BLDC GLUCOMTR-MCNC: 57 MG/DL — LOW (ref 70–99)
GLUCOSE BLDC GLUCOMTR-MCNC: 58 MG/DL — LOW (ref 70–99)
GLUCOSE BLDC GLUCOMTR-MCNC: 62 MG/DL — LOW (ref 70–99)
GLUCOSE BLDC GLUCOMTR-MCNC: 69 MG/DL — LOW (ref 70–99)
GLUCOSE BLDC GLUCOMTR-MCNC: 71 MG/DL — SIGNIFICANT CHANGE UP (ref 70–99)
GLUCOSE BLDC GLUCOMTR-MCNC: 84 MG/DL — SIGNIFICANT CHANGE UP (ref 70–99)
GLUCOSE BLDC GLUCOMTR-MCNC: 86 MG/DL — SIGNIFICANT CHANGE UP (ref 70–99)
GLUCOSE BLDC GLUCOMTR-MCNC: 88 MG/DL — SIGNIFICANT CHANGE UP (ref 70–99)
GLUCOSE BLDC GLUCOMTR-MCNC: 88 MG/DL — SIGNIFICANT CHANGE UP (ref 70–99)
GLUCOSE BLDC GLUCOMTR-MCNC: 90 MG/DL — SIGNIFICANT CHANGE UP (ref 70–99)
GLUCOSE BLDC GLUCOMTR-MCNC: 92 MG/DL — SIGNIFICANT CHANGE UP (ref 70–99)
GLUCOSE BLDC GLUCOMTR-MCNC: 94 MG/DL — SIGNIFICANT CHANGE UP (ref 70–99)
GLUCOSE BLDC GLUCOMTR-MCNC: 95 MG/DL — SIGNIFICANT CHANGE UP (ref 70–99)
GLUCOSE BLDC GLUCOMTR-MCNC: 95 MG/DL — SIGNIFICANT CHANGE UP (ref 70–99)
GLUCOSE BLDC GLUCOMTR-MCNC: 97 MG/DL — SIGNIFICANT CHANGE UP (ref 70–99)
GLUCOSE BLDC GLUCOMTR-MCNC: 97 MG/DL — SIGNIFICANT CHANGE UP (ref 70–99)
GLUCOSE BLDC GLUCOMTR-MCNC: 99 MG/DL — SIGNIFICANT CHANGE UP (ref 70–99)
GLUCOSE SERPL-MCNC: 100 MG/DL — HIGH (ref 70–99)
GLUCOSE SERPL-MCNC: 102 MG/DL — HIGH (ref 70–99)
GLUCOSE SERPL-MCNC: 107 MG/DL — HIGH (ref 70–99)
GLUCOSE SERPL-MCNC: 110 MG/DL — HIGH (ref 70–99)
GLUCOSE SERPL-MCNC: 111 MG/DL — HIGH (ref 70–99)
GLUCOSE SERPL-MCNC: 111 MG/DL — HIGH (ref 70–99)
GLUCOSE SERPL-MCNC: 112 MG/DL — HIGH (ref 70–99)
GLUCOSE SERPL-MCNC: 114 MG/DL — HIGH (ref 70–99)
GLUCOSE SERPL-MCNC: 114 MG/DL — HIGH (ref 70–99)
GLUCOSE SERPL-MCNC: 116 MG/DL — HIGH (ref 70–99)
GLUCOSE SERPL-MCNC: 116 MG/DL — HIGH (ref 70–99)
GLUCOSE SERPL-MCNC: 118 MG/DL — HIGH (ref 70–99)
GLUCOSE SERPL-MCNC: 123 MG/DL — HIGH (ref 70–99)
GLUCOSE SERPL-MCNC: 124 MG/DL — HIGH (ref 70–99)
GLUCOSE SERPL-MCNC: 127 MG/DL — HIGH (ref 70–99)
GLUCOSE SERPL-MCNC: 128 MG/DL — HIGH (ref 70–99)
GLUCOSE SERPL-MCNC: 129 MG/DL — HIGH (ref 70–99)
GLUCOSE SERPL-MCNC: 130 MG/DL — HIGH (ref 70–99)
GLUCOSE SERPL-MCNC: 131 MG/DL — HIGH (ref 70–99)
GLUCOSE SERPL-MCNC: 131 MG/DL — HIGH (ref 70–99)
GLUCOSE SERPL-MCNC: 132 MG/DL — HIGH (ref 70–99)
GLUCOSE SERPL-MCNC: 133 MG/DL — HIGH (ref 70–99)
GLUCOSE SERPL-MCNC: 134 MG/DL — HIGH (ref 70–99)
GLUCOSE SERPL-MCNC: 135 MG/DL — HIGH (ref 70–99)
GLUCOSE SERPL-MCNC: 136 MG/DL — HIGH (ref 70–99)
GLUCOSE SERPL-MCNC: 137 MG/DL — HIGH (ref 70–99)
GLUCOSE SERPL-MCNC: 137 MG/DL — HIGH (ref 70–99)
GLUCOSE SERPL-MCNC: 140 MG/DL — HIGH (ref 70–99)
GLUCOSE SERPL-MCNC: 142 MG/DL — HIGH (ref 70–99)
GLUCOSE SERPL-MCNC: 143 MG/DL — HIGH (ref 70–99)
GLUCOSE SERPL-MCNC: 144 MG/DL — HIGH (ref 70–99)
GLUCOSE SERPL-MCNC: 144 MG/DL — HIGH (ref 70–99)
GLUCOSE SERPL-MCNC: 148 MG/DL — HIGH (ref 70–99)
GLUCOSE SERPL-MCNC: 149 MG/DL — HIGH (ref 70–99)
GLUCOSE SERPL-MCNC: 149 MG/DL — HIGH (ref 70–99)
GLUCOSE SERPL-MCNC: 150 MG/DL — HIGH (ref 70–99)
GLUCOSE SERPL-MCNC: 150 MG/DL — HIGH (ref 70–99)
GLUCOSE SERPL-MCNC: 151 MG/DL — HIGH (ref 70–99)
GLUCOSE SERPL-MCNC: 152 MG/DL — HIGH (ref 70–99)
GLUCOSE SERPL-MCNC: 152 MG/DL — HIGH (ref 70–99)
GLUCOSE SERPL-MCNC: 153 MG/DL — HIGH (ref 70–99)
GLUCOSE SERPL-MCNC: 154 MG/DL — HIGH (ref 70–99)
GLUCOSE SERPL-MCNC: 156 MG/DL — HIGH (ref 70–99)
GLUCOSE SERPL-MCNC: 158 MG/DL — HIGH (ref 70–99)
GLUCOSE SERPL-MCNC: 158 MG/DL — HIGH (ref 70–99)
GLUCOSE SERPL-MCNC: 159 MG/DL — HIGH (ref 70–99)
GLUCOSE SERPL-MCNC: 159 MG/DL — HIGH (ref 70–99)
GLUCOSE SERPL-MCNC: 161 MG/DL — HIGH (ref 70–99)
GLUCOSE SERPL-MCNC: 163 MG/DL — HIGH (ref 70–99)
GLUCOSE SERPL-MCNC: 163 MG/DL — HIGH (ref 70–99)
GLUCOSE SERPL-MCNC: 164 MG/DL — HIGH (ref 70–99)
GLUCOSE SERPL-MCNC: 166 MG/DL — HIGH (ref 70–99)
GLUCOSE SERPL-MCNC: 167 MG/DL — HIGH (ref 70–99)
GLUCOSE SERPL-MCNC: 169 MG/DL — HIGH (ref 70–99)
GLUCOSE SERPL-MCNC: 170 MG/DL — HIGH (ref 70–99)
GLUCOSE SERPL-MCNC: 170 MG/DL — HIGH (ref 70–99)
GLUCOSE SERPL-MCNC: 171 MG/DL — HIGH (ref 70–99)
GLUCOSE SERPL-MCNC: 172 MG/DL — HIGH (ref 70–99)
GLUCOSE SERPL-MCNC: 175 MG/DL — HIGH (ref 70–99)
GLUCOSE SERPL-MCNC: 175 MG/DL — HIGH (ref 70–99)
GLUCOSE SERPL-MCNC: 177 MG/DL — HIGH (ref 70–99)
GLUCOSE SERPL-MCNC: 177 MG/DL — HIGH (ref 70–99)
GLUCOSE SERPL-MCNC: 180 MG/DL — HIGH (ref 70–99)
GLUCOSE SERPL-MCNC: 182 MG/DL — HIGH (ref 70–99)
GLUCOSE SERPL-MCNC: 183 MG/DL — HIGH (ref 70–99)
GLUCOSE SERPL-MCNC: 183 MG/DL — HIGH (ref 70–99)
GLUCOSE SERPL-MCNC: 187 MG/DL — HIGH (ref 70–99)
GLUCOSE SERPL-MCNC: 188 MG/DL — HIGH (ref 70–99)
GLUCOSE SERPL-MCNC: 189 MG/DL — HIGH (ref 70–99)
GLUCOSE SERPL-MCNC: 192 MG/DL — HIGH (ref 70–99)
GLUCOSE SERPL-MCNC: 193 MG/DL — HIGH (ref 70–99)
GLUCOSE SERPL-MCNC: 194 MG/DL — HIGH (ref 70–99)
GLUCOSE SERPL-MCNC: 196 MG/DL — HIGH (ref 70–99)
GLUCOSE SERPL-MCNC: 196 MG/DL — HIGH (ref 70–99)
GLUCOSE SERPL-MCNC: 197 MG/DL — HIGH (ref 70–99)
GLUCOSE SERPL-MCNC: 198 MG/DL — HIGH (ref 70–99)
GLUCOSE SERPL-MCNC: 200 MG/DL — HIGH (ref 70–99)
GLUCOSE SERPL-MCNC: 201 MG/DL — HIGH (ref 70–99)
GLUCOSE SERPL-MCNC: 203 MG/DL — HIGH (ref 70–99)
GLUCOSE SERPL-MCNC: 204 MG/DL — HIGH (ref 70–99)
GLUCOSE SERPL-MCNC: 205 MG/DL — HIGH (ref 70–99)
GLUCOSE SERPL-MCNC: 209 MG/DL — HIGH (ref 70–99)
GLUCOSE SERPL-MCNC: 210 MG/DL — HIGH (ref 70–99)
GLUCOSE SERPL-MCNC: 212 MG/DL — HIGH (ref 70–99)
GLUCOSE SERPL-MCNC: 215 MG/DL — HIGH (ref 70–99)
GLUCOSE SERPL-MCNC: 216 MG/DL — HIGH (ref 70–99)
GLUCOSE SERPL-MCNC: 217 MG/DL — HIGH (ref 70–99)
GLUCOSE SERPL-MCNC: 219 MG/DL — HIGH (ref 70–99)
GLUCOSE SERPL-MCNC: 219 MG/DL — HIGH (ref 70–99)
GLUCOSE SERPL-MCNC: 220 MG/DL — HIGH (ref 70–99)
GLUCOSE SERPL-MCNC: 220 MG/DL — HIGH (ref 70–99)
GLUCOSE SERPL-MCNC: 222 MG/DL — HIGH (ref 70–99)
GLUCOSE SERPL-MCNC: 230 MG/DL — HIGH (ref 70–99)
GLUCOSE SERPL-MCNC: 233 MG/DL — HIGH (ref 70–99)
GLUCOSE SERPL-MCNC: 235 MG/DL — HIGH (ref 70–99)
GLUCOSE SERPL-MCNC: 237 MG/DL — HIGH (ref 70–99)
GLUCOSE SERPL-MCNC: 241 MG/DL — HIGH (ref 70–99)
GLUCOSE SERPL-MCNC: 241 MG/DL — HIGH (ref 70–99)
GLUCOSE SERPL-MCNC: 244 MG/DL — HIGH (ref 70–99)
GLUCOSE SERPL-MCNC: 244 MG/DL — HIGH (ref 70–99)
GLUCOSE SERPL-MCNC: 246 MG/DL — HIGH (ref 70–99)
GLUCOSE SERPL-MCNC: 248 MG/DL — HIGH (ref 70–99)
GLUCOSE SERPL-MCNC: 250 MG/DL — HIGH (ref 70–99)
GLUCOSE SERPL-MCNC: 254 MG/DL — HIGH (ref 70–99)
GLUCOSE SERPL-MCNC: 255 MG/DL — HIGH (ref 70–99)
GLUCOSE SERPL-MCNC: 258 MG/DL — HIGH (ref 70–99)
GLUCOSE SERPL-MCNC: 264 MG/DL — HIGH (ref 70–99)
GLUCOSE SERPL-MCNC: 265 MG/DL — HIGH (ref 70–99)
GLUCOSE SERPL-MCNC: 281 MG/DL — HIGH (ref 70–99)
GLUCOSE SERPL-MCNC: 285 MG/DL — HIGH (ref 70–99)
GLUCOSE SERPL-MCNC: 290 MG/DL — HIGH (ref 70–99)
GLUCOSE SERPL-MCNC: 290 MG/DL — HIGH (ref 70–99)
GLUCOSE SERPL-MCNC: 294 MG/DL — HIGH (ref 70–99)
GLUCOSE SERPL-MCNC: 296 MG/DL — HIGH (ref 70–99)
GLUCOSE SERPL-MCNC: 319 MG/DL — HIGH (ref 70–99)
GLUCOSE SERPL-MCNC: 328 MG/DL — HIGH (ref 70–99)
GLUCOSE SERPL-MCNC: 369 MG/DL — HIGH (ref 70–99)
GLUCOSE SERPL-MCNC: 49 MG/DL — CRITICAL LOW (ref 70–99)
GLUCOSE SERPL-MCNC: 54 MG/DL — CRITICAL LOW (ref 70–99)
GLUCOSE SERPL-MCNC: 60 MG/DL — LOW (ref 70–99)
GLUCOSE SERPL-MCNC: 74 MG/DL — SIGNIFICANT CHANGE UP (ref 70–99)
GLUCOSE SERPL-MCNC: 88 MG/DL — SIGNIFICANT CHANGE UP (ref 70–99)
GLUCOSE SERPL-MCNC: 91 MG/DL — SIGNIFICANT CHANGE UP (ref 70–99)
GLUCOSE SERPL-MCNC: 94 MG/DL — SIGNIFICANT CHANGE UP (ref 70–99)
GLUCOSE SERPL-MCNC: 96 MG/DL — SIGNIFICANT CHANGE UP (ref 70–99)
GLUCOSE SERPL-MCNC: 96 MG/DL — SIGNIFICANT CHANGE UP (ref 70–99)
GLUCOSE SERPL-MCNC: 97 MG/DL — SIGNIFICANT CHANGE UP (ref 70–99)
GLUCOSE SERPL-MCNC: 99 MG/DL — SIGNIFICANT CHANGE UP (ref 70–99)
GLUCOSE UR QL: >=1000 MG/DL
GLUCOSE UR QL: NEGATIVE MG/DL — SIGNIFICANT CHANGE UP
GRAM STN FLD: ABNORMAL
GRAM STN FLD: SIGNIFICANT CHANGE UP
HADV DNA FLD NAA+PROBE-LOG#: SIGNIFICANT CHANGE UP COPIES/ML
HAPTOGLOB SERPL-MCNC: 22 MG/DL — LOW (ref 34–200)
HAPTOGLOB SERPL-MCNC: 41 MG/DL — SIGNIFICANT CHANGE UP (ref 34–200)
HAPTOGLOB SERPL-MCNC: 42 MG/DL — SIGNIFICANT CHANGE UP (ref 34–200)
HAPTOGLOB SERPL-MCNC: 49 MG/DL — SIGNIFICANT CHANGE UP (ref 34–200)
HAPTOGLOB SERPL-MCNC: <20 MG/DL — LOW (ref 34–200)
HBV CORE AB SER-ACNC: SIGNIFICANT CHANGE UP
HBV DNA # SERPL NAA+PROBE: SIGNIFICANT CHANGE UP
HBV DNA SERPL NAA+PROBE-LOG#: SIGNIFICANT CHANGE UP LOGIU/ML
HBV SURFACE AB SER-ACNC: 41.9 MIU/ML — SIGNIFICANT CHANGE UP
HBV SURFACE AG SER-ACNC: SIGNIFICANT CHANGE UP
HCO3 BLDMV-SCNC: 12 MMOL/L — LOW (ref 20–28)
HCO3 BLDMV-SCNC: 14 MMOL/L — LOW (ref 20–28)
HCO3 BLDMV-SCNC: 15 MMOL/L — LOW (ref 20–28)
HCO3 BLDMV-SCNC: 16 MMOL/L — LOW (ref 20–28)
HCO3 BLDMV-SCNC: 17 MMOL/L — LOW (ref 20–28)
HCO3 BLDMV-SCNC: 18 MMOL/L — LOW (ref 20–28)
HCO3 BLDMV-SCNC: 19 MMOL/L — LOW (ref 20–28)
HCO3 BLDMV-SCNC: 20 MMOL/L — SIGNIFICANT CHANGE UP (ref 20–28)
HCO3 BLDMV-SCNC: 21 MMOL/L — SIGNIFICANT CHANGE UP (ref 20–28)
HCO3 BLDMV-SCNC: 22 MMOL/L — SIGNIFICANT CHANGE UP (ref 20–28)
HCT VFR BLD CALC: 19.6 % — CRITICAL LOW (ref 39–50)
HCT VFR BLD CALC: 20.2 % — CRITICAL LOW (ref 39–50)
HCT VFR BLD CALC: 20.6 % — CRITICAL LOW (ref 39–50)
HCT VFR BLD CALC: 20.8 % — CRITICAL LOW (ref 39–50)
HCT VFR BLD CALC: 21.2 % — LOW (ref 39–50)
HCT VFR BLD CALC: 21.6 % — LOW (ref 39–50)
HCT VFR BLD CALC: 21.7 % — LOW (ref 39–50)
HCT VFR BLD CALC: 22 % — LOW (ref 39–50)
HCT VFR BLD CALC: 22.1 % — LOW (ref 39–50)
HCT VFR BLD CALC: 22.3 % — LOW (ref 39–50)
HCT VFR BLD CALC: 22.4 % — LOW (ref 39–50)
HCT VFR BLD CALC: 22.6 % — LOW (ref 39–50)
HCT VFR BLD CALC: 22.6 % — LOW (ref 39–50)
HCT VFR BLD CALC: 22.7 % — LOW (ref 39–50)
HCT VFR BLD CALC: 22.7 % — LOW (ref 39–50)
HCT VFR BLD CALC: 22.9 % — LOW (ref 39–50)
HCT VFR BLD CALC: 23 % — LOW (ref 39–50)
HCT VFR BLD CALC: 23 % — LOW (ref 39–50)
HCT VFR BLD CALC: 23.1 % — LOW (ref 39–50)
HCT VFR BLD CALC: 23.1 % — LOW (ref 39–50)
HCT VFR BLD CALC: 23.2 % — LOW (ref 39–50)
HCT VFR BLD CALC: 23.3 % — LOW (ref 39–50)
HCT VFR BLD CALC: 23.7 % — LOW (ref 39–50)
HCT VFR BLD CALC: 23.9 % — LOW (ref 39–50)
HCT VFR BLD CALC: 24.2 % — LOW (ref 39–50)
HCT VFR BLD CALC: 24.3 % — LOW (ref 39–50)
HCT VFR BLD CALC: 24.3 % — LOW (ref 39–50)
HCT VFR BLD CALC: 24.4 % — LOW (ref 39–50)
HCT VFR BLD CALC: 25 % — LOW (ref 39–50)
HCT VFR BLD CALC: 25.2 % — LOW (ref 39–50)
HCT VFR BLD CALC: 25.5 % — LOW (ref 39–50)
HCT VFR BLD CALC: 25.6 % — LOW (ref 39–50)
HCT VFR BLD CALC: 25.9 % — LOW (ref 39–50)
HCT VFR BLD CALC: 26.2 % — LOW (ref 39–50)
HCT VFR BLD CALC: 26.3 % — LOW (ref 39–50)
HCT VFR BLD CALC: 26.3 % — LOW (ref 39–50)
HCT VFR BLD CALC: 26.4 % — LOW (ref 39–50)
HCT VFR BLD CALC: 26.5 % — LOW (ref 39–50)
HCT VFR BLD CALC: 26.5 % — LOW (ref 39–50)
HCT VFR BLD CALC: 26.6 % — LOW (ref 39–50)
HCT VFR BLD CALC: 26.9 % — LOW (ref 39–50)
HCT VFR BLD CALC: 26.9 % — LOW (ref 39–50)
HCT VFR BLD CALC: 27 % — LOW (ref 39–50)
HCT VFR BLD CALC: 27.1 % — LOW (ref 39–50)
HCT VFR BLD CALC: 27.2 % — LOW (ref 39–50)
HCT VFR BLD CALC: 27.3 % — LOW (ref 39–50)
HCT VFR BLD CALC: 27.3 % — LOW (ref 39–50)
HCT VFR BLD CALC: 27.4 % — LOW (ref 39–50)
HCT VFR BLD CALC: 27.6 % — LOW (ref 39–50)
HCT VFR BLD CALC: 27.6 % — LOW (ref 39–50)
HCT VFR BLD CALC: 27.7 % — LOW (ref 39–50)
HCT VFR BLD CALC: 27.8 % — LOW (ref 39–50)
HCT VFR BLD CALC: 27.8 % — LOW (ref 39–50)
HCT VFR BLD CALC: 28.1 % — LOW (ref 39–50)
HCT VFR BLD CALC: 28.6 % — LOW (ref 39–50)
HCT VFR BLD CALC: 28.9 % — LOW (ref 39–50)
HCT VFR BLD CALC: 29.1 % — LOW (ref 39–50)
HCT VFR BLD CALC: 29.2 % — LOW (ref 39–50)
HCT VFR BLD CALC: 29.3 % — LOW (ref 39–50)
HCT VFR BLD CALC: 29.5 % — LOW (ref 39–50)
HCT VFR BLD CALC: 29.7 % — LOW (ref 39–50)
HCT VFR BLD CALC: 29.8 % — LOW (ref 39–50)
HCT VFR BLD CALC: 30 % — LOW (ref 39–50)
HCT VFR BLD CALC: 30.1 % — LOW (ref 39–50)
HCT VFR BLD CALC: 30.2 % — LOW (ref 39–50)
HCT VFR BLD CALC: 30.2 % — LOW (ref 39–50)
HCT VFR BLD CALC: 30.3 % — LOW (ref 39–50)
HCT VFR BLD CALC: 30.4 % — LOW (ref 39–50)
HCT VFR BLD CALC: 30.6 % — LOW (ref 39–50)
HCT VFR BLD CALC: 30.7 % — LOW (ref 39–50)
HCT VFR BLD CALC: 30.8 % — LOW (ref 39–50)
HCT VFR BLD CALC: 30.8 % — LOW (ref 39–50)
HCT VFR BLD CALC: 30.9 % — LOW (ref 39–50)
HCT VFR BLD CALC: 30.9 % — LOW (ref 39–50)
HCT VFR BLD CALC: 31.1 % — LOW (ref 39–50)
HCT VFR BLD CALC: 31.3 % — LOW (ref 39–50)
HCT VFR BLD CALC: 31.5 % — LOW (ref 39–50)
HCT VFR BLD CALC: 31.6 % — LOW (ref 39–50)
HCT VFR BLD CALC: 31.6 % — LOW (ref 39–50)
HCT VFR BLD CALC: 31.7 % — LOW (ref 39–50)
HCT VFR BLD CALC: 31.9 % — LOW (ref 39–50)
HCT VFR BLD CALC: 32 % — LOW (ref 39–50)
HCT VFR BLD CALC: 32.1 % — LOW (ref 39–50)
HCT VFR BLD CALC: 32.2 % — LOW (ref 39–50)
HCT VFR BLD CALC: 32.3 % — LOW (ref 39–50)
HCT VFR BLD CALC: 32.4 % — LOW (ref 39–50)
HCT VFR BLD CALC: 32.4 % — LOW (ref 39–50)
HCT VFR BLD CALC: 32.6 % — LOW (ref 39–50)
HCT VFR BLD CALC: 32.7 % — LOW (ref 39–50)
HCT VFR BLD CALC: 32.7 % — LOW (ref 39–50)
HCT VFR BLD CALC: 32.9 % — LOW (ref 39–50)
HCT VFR BLD CALC: 32.9 % — LOW (ref 39–50)
HCT VFR BLD CALC: 33.1 % — LOW (ref 39–50)
HCT VFR BLD CALC: 33.3 % — LOW (ref 39–50)
HCT VFR BLD CALC: 33.4 % — LOW (ref 39–50)
HCT VFR BLD CALC: 33.5 % — LOW (ref 39–50)
HCT VFR BLD CALC: 33.6 % — LOW (ref 39–50)
HCT VFR BLD CALC: 33.8 % — LOW (ref 39–50)
HCT VFR BLD CALC: 33.8 % — LOW (ref 39–50)
HCT VFR BLD CALC: 34 % — LOW (ref 39–50)
HCT VFR BLD CALC: 34.1 % — LOW (ref 39–50)
HCT VFR BLD CALC: 34.1 % — LOW (ref 39–50)
HCT VFR BLD CALC: 34.2 % — LOW (ref 39–50)
HCT VFR BLD CALC: 34.5 % — LOW (ref 39–50)
HCT VFR BLD CALC: 34.5 % — LOW (ref 39–50)
HCT VFR BLD CALC: 34.7 % — LOW (ref 39–50)
HCT VFR BLD CALC: 34.7 % — LOW (ref 39–50)
HCT VFR BLD CALC: 35.7 % — LOW (ref 39–50)
HCT VFR BLD CALC: 36 % — LOW (ref 39–50)
HCT VFR BLD CALC: 36.1 % — LOW (ref 39–50)
HCT VFR BLD CALC: 36.1 % — LOW (ref 39–50)
HCT VFR BLD CALC: 37.2 % — LOW (ref 39–50)
HCT VFR BLD CALC: 37.4 % — LOW (ref 39–50)
HCT VFR BLD CALC: 37.6 % — LOW (ref 39–50)
HCT VFR BLD CALC: 38 % — LOW (ref 39–50)
HCT VFR BLD CALC: 40.3 % — SIGNIFICANT CHANGE UP (ref 39–50)
HCV AB S/CO SERPL IA: 0.06 S/CO — SIGNIFICANT CHANGE UP
HCV AB SERPL-IMP: SIGNIFICANT CHANGE UP
HCV RNA SPEC NAA+PROBE-LOG IU: SIGNIFICANT CHANGE UP
HCV RNA SPEC NAA+PROBE-LOG IU: SIGNIFICANT CHANGE UP
HCV RNA SPEC NAA+PROBE-LOG IU: SIGNIFICANT CHANGE UP LOGIU/ML
HCV RNA SPEC NAA+PROBE-LOG IU: SIGNIFICANT CHANGE UP LOGIU/ML
HEPARIN-PF4 AB RESULT: <0.6 U/ML — SIGNIFICANT CHANGE UP (ref 0–0.9)
HEPARINASE TEG R TIME: 11.1 MIN — HIGH (ref 4.3–8.3)
HEPARINASE TEG R TIME: 4.3 MIN — SIGNIFICANT CHANGE UP (ref 4.3–8.3)
HEPARINASE TEG R TIME: 5 MIN — SIGNIFICANT CHANGE UP (ref 4.3–8.3)
HEPARINASE TEG R TIME: 5.4 MIN — SIGNIFICANT CHANGE UP (ref 4.3–8.3)
HEPARINASE TEG R TIME: 5.9 MIN — SIGNIFICANT CHANGE UP (ref 4.3–8.3)
HEPARINASE TEG R TIME: 6.1 MIN — SIGNIFICANT CHANGE UP (ref 4.3–8.3)
HEPARINASE TEG R TIME: 6.5 MIN — SIGNIFICANT CHANGE UP (ref 4.3–8.3)
HGB BLD-MCNC: 10 G/DL — LOW (ref 13–17)
HGB BLD-MCNC: 10.1 G/DL — LOW (ref 13–17)
HGB BLD-MCNC: 10.2 G/DL — LOW (ref 13–17)
HGB BLD-MCNC: 10.3 G/DL — LOW (ref 13–17)
HGB BLD-MCNC: 10.4 G/DL — LOW (ref 13–17)
HGB BLD-MCNC: 10.5 G/DL — LOW (ref 13–17)
HGB BLD-MCNC: 10.5 G/DL — LOW (ref 13–17)
HGB BLD-MCNC: 10.6 G/DL — LOW (ref 13–17)
HGB BLD-MCNC: 10.7 G/DL — LOW (ref 13–17)
HGB BLD-MCNC: 10.8 G/DL — LOW (ref 13–17)
HGB BLD-MCNC: 11 G/DL — LOW (ref 13–17)
HGB BLD-MCNC: 11 G/DL — LOW (ref 13–17)
HGB BLD-MCNC: 11.2 G/DL — LOW (ref 13–17)
HGB BLD-MCNC: 11.2 G/DL — LOW (ref 13–17)
HGB BLD-MCNC: 11.3 G/DL — LOW (ref 13–17)
HGB BLD-MCNC: 11.4 G/DL — LOW (ref 13–17)
HGB BLD-MCNC: 11.5 G/DL — LOW (ref 13–17)
HGB BLD-MCNC: 11.7 G/DL — LOW (ref 13–17)
HGB BLD-MCNC: 12 G/DL — LOW (ref 13–17)
HGB BLD-MCNC: 6.6 G/DL — CRITICAL LOW (ref 13–17)
HGB BLD-MCNC: 7 G/DL — CRITICAL LOW (ref 13–17)
HGB BLD-MCNC: 7.1 G/DL — LOW (ref 13–17)
HGB BLD-MCNC: 7.4 G/DL — LOW (ref 13–17)
HGB BLD-MCNC: 7.4 G/DL — LOW (ref 13–17)
HGB BLD-MCNC: 7.6 G/DL — LOW (ref 13–17)
HGB BLD-MCNC: 7.7 G/DL — LOW (ref 13–17)
HGB BLD-MCNC: 7.8 G/DL — LOW (ref 13–17)
HGB BLD-MCNC: 7.9 G/DL — LOW (ref 13–17)
HGB BLD-MCNC: 8.1 G/DL — LOW (ref 13–17)
HGB BLD-MCNC: 8.2 G/DL — LOW (ref 13–17)
HGB BLD-MCNC: 8.3 G/DL — LOW (ref 13–17)
HGB BLD-MCNC: 8.4 G/DL — LOW (ref 13–17)
HGB BLD-MCNC: 8.5 G/DL — LOW (ref 13–17)
HGB BLD-MCNC: 8.5 G/DL — LOW (ref 13–17)
HGB BLD-MCNC: 8.6 G/DL — LOW (ref 13–17)
HGB BLD-MCNC: 8.8 G/DL — LOW (ref 13–17)
HGB BLD-MCNC: 8.9 G/DL — LOW (ref 13–17)
HGB BLD-MCNC: 9 G/DL — LOW (ref 13–17)
HGB BLD-MCNC: 9.1 G/DL — LOW (ref 13–17)
HGB BLD-MCNC: 9.2 G/DL — LOW (ref 13–17)
HGB BLD-MCNC: 9.3 G/DL — LOW (ref 13–17)
HGB BLD-MCNC: 9.4 G/DL — LOW (ref 13–17)
HGB BLD-MCNC: 9.6 G/DL — LOW (ref 13–17)
HGB BLD-MCNC: 9.6 G/DL — LOW (ref 13–17)
HGB BLD-MCNC: 9.7 G/DL — LOW (ref 13–17)
HGB BLD-MCNC: 9.8 G/DL — LOW (ref 13–17)
HGB BLD-MCNC: 9.9 G/DL — LOW (ref 13–17)
HIV 1+2 AB+HIV1 P24 AG SERPL QL IA: SIGNIFICANT CHANGE UP
HIV-1 VIRAL LOAD RESULT: SIGNIFICANT CHANGE UP
HIV1 RNA # SERPL NAA+PROBE: SIGNIFICANT CHANGE UP COPIES/ML
HIV1 RNA SER-IMP: SIGNIFICANT CHANGE UP
HIV1 RNA SERPL NAA+PROBE-ACNC: SIGNIFICANT CHANGE UP
HIV1 RNA SERPL NAA+PROBE-LOG#: SIGNIFICANT CHANGE UP LG COP/ML
HOROWITZ INDEX BLDMV+IHG-RTO: 100 — SIGNIFICANT CHANGE UP
HOROWITZ INDEX BLDMV+IHG-RTO: 40 — SIGNIFICANT CHANGE UP
HOROWITZ INDEX BLDMV+IHG-RTO: 50 — SIGNIFICANT CHANGE UP
HSV+VZV DNA SPEC QL NAA+PROBE: SIGNIFICANT CHANGE UP
HYALINE CASTS # UR AUTO: PRESENT
IGG FLD-MCNC: 267 MG/DL — LOW (ref 610–1660)
IGG1 SER-MCNC: 166 MG/DL — LOW (ref 240–1118)
IGG2 SER-MCNC: 83 MG/DL — LOW (ref 124–549)
IGG3 SER-MCNC: 11.8 MG/DL — LOW (ref 15–102)
IGG4 SER-MCNC: 16.2 MG/DL — SIGNIFICANT CHANGE UP (ref 1–123)
IMM GRANULOCYTES NFR BLD AUTO: 0.5 % — SIGNIFICANT CHANGE UP (ref 0–0.9)
IMM GRANULOCYTES NFR BLD AUTO: 0.6 % — SIGNIFICANT CHANGE UP (ref 0–0.9)
IMM GRANULOCYTES NFR BLD AUTO: 0.6 % — SIGNIFICANT CHANGE UP (ref 0–0.9)
IMM GRANULOCYTES NFR BLD AUTO: 0.8 % — SIGNIFICANT CHANGE UP (ref 0–0.9)
IMM GRANULOCYTES NFR BLD AUTO: 0.8 % — SIGNIFICANT CHANGE UP (ref 0–0.9)
IMM GRANULOCYTES NFR BLD AUTO: 1 % — HIGH (ref 0–0.9)
IMM GRANULOCYTES NFR BLD AUTO: 1.1 % — HIGH (ref 0–0.9)
IMM GRANULOCYTES NFR BLD AUTO: 1.5 % — HIGH (ref 0–0.9)
IMM GRANULOCYTES NFR BLD AUTO: 1.7 % — HIGH (ref 0–0.9)
IMM GRANULOCYTES NFR BLD AUTO: 11.1 % — HIGH (ref 0–0.9)
IMM GRANULOCYTES NFR BLD AUTO: 2.3 % — HIGH (ref 0–0.9)
IMM GRANULOCYTES NFR BLD AUTO: 4.7 % — HIGH (ref 0–0.9)
IMM GRANULOCYTES NFR BLD AUTO: 5.1 % — HIGH (ref 0–0.9)
IMM GRANULOCYTES NFR BLD AUTO: 5.8 % — HIGH (ref 0–0.9)
IMM GRANULOCYTES NFR BLD AUTO: 9.1 % — HIGH (ref 0–0.9)
IMM GRANULOCYTES NFR BLD AUTO: 9.4 % — HIGH (ref 0–0.9)
INR BLD: 1.12 RATIO — SIGNIFICANT CHANGE UP (ref 0.85–1.16)
INR BLD: 1.13 RATIO — SIGNIFICANT CHANGE UP (ref 0.85–1.16)
INR BLD: 1.17 RATIO — HIGH (ref 0.85–1.16)
INR BLD: 1.19 RATIO — HIGH (ref 0.85–1.16)
INR BLD: 1.2 RATIO — HIGH (ref 0.85–1.16)
INR BLD: 1.2 RATIO — HIGH (ref 0.85–1.16)
INR BLD: 1.23 RATIO — HIGH (ref 0.85–1.16)
INR BLD: 1.24 RATIO — HIGH (ref 0.85–1.16)
INR BLD: 1.25 RATIO — HIGH (ref 0.85–1.16)
INR BLD: 1.29 RATIO — HIGH (ref 0.85–1.16)
INR BLD: 1.31 RATIO — HIGH (ref 0.85–1.16)
INR BLD: 1.33 RATIO — HIGH (ref 0.85–1.16)
INR BLD: 1.33 RATIO — HIGH (ref 0.85–1.16)
INR BLD: 1.34 RATIO — HIGH (ref 0.85–1.16)
INR BLD: 1.35 RATIO — HIGH (ref 0.85–1.16)
INR BLD: 1.35 RATIO — HIGH (ref 0.85–1.16)
INR BLD: 1.36 RATIO — HIGH (ref 0.85–1.16)
INR BLD: 1.37 RATIO — HIGH (ref 0.85–1.16)
INR BLD: 1.38 RATIO — HIGH (ref 0.85–1.16)
INR BLD: 1.39 RATIO — HIGH (ref 0.85–1.16)
INR BLD: 1.4 RATIO — HIGH (ref 0.85–1.16)
INR BLD: 1.4 RATIO — HIGH (ref 0.85–1.16)
INR BLD: 1.42 RATIO — HIGH (ref 0.85–1.16)
INR BLD: 1.44 RATIO — HIGH (ref 0.85–1.16)
INR BLD: 1.46 RATIO — HIGH (ref 0.85–1.16)
INR BLD: 1.52 RATIO — HIGH (ref 0.85–1.16)
INR BLD: 1.53 RATIO — HIGH (ref 0.85–1.16)
INR BLD: 1.54 RATIO — HIGH (ref 0.85–1.16)
INR BLD: 1.55 RATIO — HIGH (ref 0.85–1.16)
INR BLD: 1.55 RATIO — HIGH (ref 0.85–1.16)
INR BLD: 1.57 RATIO — HIGH (ref 0.85–1.16)
INR BLD: 1.59 RATIO — HIGH (ref 0.85–1.16)
INR BLD: 1.6 RATIO — HIGH (ref 0.85–1.16)
INR BLD: 1.61 RATIO — HIGH (ref 0.85–1.16)
INR BLD: 1.64 RATIO — HIGH (ref 0.85–1.16)
INR BLD: 1.68 RATIO — HIGH (ref 0.85–1.16)
INR BLD: 1.69 RATIO — HIGH (ref 0.85–1.16)
INR BLD: 1.69 RATIO — HIGH (ref 0.85–1.16)
INR BLD: 1.7 RATIO — HIGH (ref 0.85–1.16)
INR BLD: 1.7 RATIO — HIGH (ref 0.85–1.16)
INR BLD: 1.72 RATIO — HIGH (ref 0.85–1.16)
INR BLD: 1.76 RATIO — HIGH (ref 0.85–1.16)
INR BLD: 1.76 RATIO — HIGH (ref 0.85–1.16)
INR BLD: 1.77 RATIO — HIGH (ref 0.85–1.16)
INR BLD: 1.79 RATIO — HIGH (ref 0.85–1.16)
INR BLD: 1.8 RATIO — HIGH (ref 0.85–1.16)
INR BLD: 1.8 RATIO — HIGH (ref 0.85–1.16)
INR BLD: 1.81 RATIO — HIGH (ref 0.85–1.16)
INR BLD: 1.81 RATIO — HIGH (ref 0.85–1.16)
INR BLD: 1.82 RATIO — HIGH (ref 0.85–1.16)
INR BLD: 1.83 RATIO — HIGH (ref 0.85–1.16)
INR BLD: 1.84 RATIO — HIGH (ref 0.85–1.16)
INR BLD: 1.85 RATIO — HIGH (ref 0.85–1.16)
INR BLD: 1.86 RATIO — HIGH (ref 0.85–1.16)
INR BLD: 1.86 RATIO — HIGH (ref 0.85–1.16)
INR BLD: 1.88 RATIO — HIGH (ref 0.85–1.16)
INR BLD: 1.88 RATIO — HIGH (ref 0.85–1.16)
INR BLD: 1.9 RATIO — HIGH (ref 0.85–1.16)
INR BLD: 1.9 RATIO — HIGH (ref 0.85–1.16)
INR BLD: 1.93 RATIO — HIGH (ref 0.85–1.16)
INR BLD: 1.94 RATIO — HIGH (ref 0.85–1.16)
INR BLD: 1.95 RATIO — HIGH (ref 0.85–1.16)
INR BLD: 1.97 RATIO — HIGH (ref 0.85–1.16)
INR BLD: 2.06 RATIO — HIGH (ref 0.85–1.16)
INR BLD: 2.09 RATIO — HIGH (ref 0.85–1.16)
INR BLD: 2.12 RATIO — HIGH (ref 0.85–1.16)
INR BLD: 2.13 RATIO — HIGH (ref 0.85–1.16)
INR BLD: 2.21 RATIO — HIGH (ref 0.85–1.16)
INR BLD: 2.28 RATIO — HIGH (ref 0.85–1.16)
INR BLD: 2.31 RATIO — HIGH (ref 0.85–1.16)
INR BLD: 2.33 RATIO — HIGH (ref 0.85–1.16)
INR BLD: 2.37 RATIO — HIGH (ref 0.85–1.16)
INR BLD: 2.4 RATIO — HIGH (ref 0.85–1.16)
INR BLD: 2.46 RATIO — HIGH (ref 0.85–1.16)
INR BLD: 2.47 RATIO — HIGH (ref 0.85–1.16)
INR BLD: 2.49 RATIO — HIGH (ref 0.85–1.16)
INR BLD: 2.58 RATIO — HIGH (ref 0.85–1.16)
INR BLD: 2.64 RATIO — HIGH (ref 0.85–1.16)
INR BLD: 2.64 RATIO — HIGH (ref 0.85–1.16)
INR BLD: 2.77 RATIO — HIGH (ref 0.85–1.16)
INR BLD: 2.78 RATIO — HIGH (ref 0.85–1.16)
INR BLD: 2.79 RATIO — HIGH (ref 0.85–1.16)
INR BLD: 2.85 RATIO — HIGH (ref 0.85–1.16)
INR BLD: 2.95 RATIO — HIGH (ref 0.85–1.16)
INR BLD: 3.18 RATIO — HIGH (ref 0.85–1.16)
INR BLD: 3.26 RATIO — HIGH (ref 0.85–1.16)
INR BLD: 3.31 RATIO — HIGH (ref 0.85–1.16)
INR BLD: 3.44 RATIO — HIGH (ref 0.85–1.16)
INR BLD: 3.77 RATIO — HIGH (ref 0.85–1.16)
INR BLD: 4.44 RATIO — HIGH (ref 0.85–1.16)
KETONES UR-MCNC: ABNORMAL MG/DL
KETONES UR-MCNC: NEGATIVE MG/DL — SIGNIFICANT CHANGE UP
L PNEUMO DNA SPEC QL NAA+PROBE: SIGNIFICANT CHANGE UP
LACTATE BLDA-MCNC: 8.9 MMOL/L — CRITICAL HIGH (ref 0.5–2)
LDH SERPL L TO P-CCNC: 231 U/L — SIGNIFICANT CHANGE UP (ref 50–242)
LDH SERPL L TO P-CCNC: 241 U/L — SIGNIFICANT CHANGE UP (ref 50–242)
LDH SERPL L TO P-CCNC: 288 U/L — HIGH (ref 50–242)
LDH SERPL L TO P-CCNC: 375 U/L — HIGH (ref 50–242)
LDH SERPL L TO P-CCNC: 766 U/L — HIGH (ref 50–242)
LEGIONELLA DNA SPEC NAA+PROBE: SIGNIFICANT CHANGE UP
LEUKOCYTE ESTERASE UR-ACNC: ABNORMAL
LEUKOCYTE ESTERASE UR-ACNC: NEGATIVE — SIGNIFICANT CHANGE UP
LIDOCAIN IGE QN: 3 U/L — LOW (ref 7–60)
LIDOCAIN IGE QN: 32 U/L — SIGNIFICANT CHANGE UP (ref 7–60)
LYMPHOCYTES # BLD AUTO: 0 % — LOW (ref 13–44)
LYMPHOCYTES # BLD AUTO: 0 % — LOW (ref 13–44)
LYMPHOCYTES # BLD AUTO: 0 K/UL — LOW (ref 1–3.3)
LYMPHOCYTES # BLD AUTO: 0 K/UL — LOW (ref 1–3.3)
LYMPHOCYTES # BLD AUTO: 0.02 K/UL — LOW (ref 1–3.3)
LYMPHOCYTES # BLD AUTO: 0.04 K/UL — LOW (ref 1–3.3)
LYMPHOCYTES # BLD AUTO: 0.04 K/UL — LOW (ref 1–3.3)
LYMPHOCYTES # BLD AUTO: 0.05 K/UL — LOW (ref 1–3.3)
LYMPHOCYTES # BLD AUTO: 0.05 K/UL — LOW (ref 1–3.3)
LYMPHOCYTES # BLD AUTO: 0.06 K/UL — LOW (ref 1–3.3)
LYMPHOCYTES # BLD AUTO: 0.06 K/UL — LOW (ref 1–3.3)
LYMPHOCYTES # BLD AUTO: 0.07 K/UL — LOW (ref 1–3.3)
LYMPHOCYTES # BLD AUTO: 0.07 K/UL — LOW (ref 1–3.3)
LYMPHOCYTES # BLD AUTO: 0.08 K/UL — LOW (ref 1–3.3)
LYMPHOCYTES # BLD AUTO: 0.08 K/UL — LOW (ref 1–3.3)
LYMPHOCYTES # BLD AUTO: 0.09 K/UL — LOW (ref 1–3.3)
LYMPHOCYTES # BLD AUTO: 0.11 K/UL — LOW (ref 1–3.3)
LYMPHOCYTES # BLD AUTO: 0.12 K/UL — LOW (ref 1–3.3)
LYMPHOCYTES # BLD AUTO: 0.14 K/UL — LOW (ref 1–3.3)
LYMPHOCYTES # BLD AUTO: 0.15 K/UL — LOW (ref 1–3.3)
LYMPHOCYTES # BLD AUTO: 0.16 K/UL — LOW (ref 1–3.3)
LYMPHOCYTES # BLD AUTO: 0.17 K/UL — LOW (ref 1–3.3)
LYMPHOCYTES # BLD AUTO: 0.17 K/UL — LOW (ref 1–3.3)
LYMPHOCYTES # BLD AUTO: 0.2 K/UL — LOW (ref 1–3.3)
LYMPHOCYTES # BLD AUTO: 0.2 K/UL — LOW (ref 1–3.3)
LYMPHOCYTES # BLD AUTO: 0.3 K/UL — LOW (ref 1–3.3)
LYMPHOCYTES # BLD AUTO: 0.7 % — LOW (ref 13–44)
LYMPHOCYTES # BLD AUTO: 0.8 % — LOW (ref 13–44)
LYMPHOCYTES # BLD AUTO: 0.9 % — LOW (ref 13–44)
LYMPHOCYTES # BLD AUTO: 1 % — LOW (ref 13–44)
LYMPHOCYTES # BLD AUTO: 1.2 % — LOW (ref 13–44)
LYMPHOCYTES # BLD AUTO: 1.3 % — LOW (ref 13–44)
LYMPHOCYTES # BLD AUTO: 1.4 % — LOW (ref 13–44)
LYMPHOCYTES # BLD AUTO: 1.5 % — LOW (ref 13–44)
LYMPHOCYTES # BLD AUTO: 1.6 % — LOW (ref 13–44)
LYMPHOCYTES # BLD AUTO: 1.6 % — LOW (ref 13–44)
LYMPHOCYTES # BLD AUTO: 1.7 % — LOW (ref 13–44)
LYMPHOCYTES # BLD AUTO: 1.7 % — LOW (ref 13–44)
LYMPHOCYTES # BLD AUTO: 1.8 % — LOW (ref 13–44)
LYMPHOCYTES # BLD AUTO: 10 % — LOW (ref 13–44)
LYMPHOCYTES # BLD AUTO: 2 % — LOW (ref 13–44)
LYMPHOCYTES # BLD AUTO: 2.6 % — LOW (ref 13–44)
LYMPHOCYTES # BLD AUTO: 3 % — LOW (ref 13–44)
LYMPHOCYTES # BLD AUTO: 6 % — LOW (ref 13–44)
LYMPHOCYTES # BLD AUTO: 7.8 % — LOW (ref 13–44)
LYMPHOCYTES # BLD AUTO: 8 % — LOW (ref 13–44)
MACROCYTES BLD QL: SIGNIFICANT CHANGE UP
MACROCYTES BLD QL: SLIGHT — SIGNIFICANT CHANGE UP
MAGNESIUM SERPL-MCNC: 1.8 MG/DL — SIGNIFICANT CHANGE UP (ref 1.6–2.6)
MAGNESIUM SERPL-MCNC: 1.9 MG/DL — SIGNIFICANT CHANGE UP (ref 1.6–2.6)
MAGNESIUM SERPL-MCNC: 1.9 MG/DL — SIGNIFICANT CHANGE UP (ref 1.6–2.6)
MAGNESIUM SERPL-MCNC: 2 MG/DL — SIGNIFICANT CHANGE UP (ref 1.6–2.6)
MAGNESIUM SERPL-MCNC: 2.1 MG/DL — SIGNIFICANT CHANGE UP (ref 1.6–2.6)
MAGNESIUM SERPL-MCNC: 2.2 MG/DL — SIGNIFICANT CHANGE UP (ref 1.6–2.6)
MAGNESIUM SERPL-MCNC: 2.3 MG/DL — SIGNIFICANT CHANGE UP (ref 1.6–2.6)
MAGNESIUM SERPL-MCNC: 2.4 MG/DL — SIGNIFICANT CHANGE UP (ref 1.6–2.6)
MAGNESIUM SERPL-MCNC: 2.5 MG/DL — SIGNIFICANT CHANGE UP (ref 1.6–2.6)
MAGNESIUM SERPL-MCNC: 2.6 MG/DL — SIGNIFICANT CHANGE UP (ref 1.6–2.6)
MAGNESIUM SERPL-MCNC: 2.6 MG/DL — SIGNIFICANT CHANGE UP (ref 1.6–2.6)
MAGNESIUM SERPL-MCNC: 2.8 MG/DL — HIGH (ref 1.6–2.6)
MAGNESIUM SERPL-MCNC: 3 MG/DL — HIGH (ref 1.6–2.6)
MANUAL SMEAR VERIFICATION: SIGNIFICANT CHANGE UP
MCHC RBC-ENTMCNC: 24.4 PG — LOW (ref 27–34)
MCHC RBC-ENTMCNC: 24.7 PG — LOW (ref 27–34)
MCHC RBC-ENTMCNC: 24.7 PG — LOW (ref 27–34)
MCHC RBC-ENTMCNC: 24.9 PG — LOW (ref 27–34)
MCHC RBC-ENTMCNC: 25.1 PG — LOW (ref 27–34)
MCHC RBC-ENTMCNC: 25.1 PG — LOW (ref 27–34)
MCHC RBC-ENTMCNC: 25.2 PG — LOW (ref 27–34)
MCHC RBC-ENTMCNC: 25.3 PG — LOW (ref 27–34)
MCHC RBC-ENTMCNC: 25.4 PG — LOW (ref 27–34)
MCHC RBC-ENTMCNC: 25.5 PG — LOW (ref 27–34)
MCHC RBC-ENTMCNC: 25.6 PG — LOW (ref 27–34)
MCHC RBC-ENTMCNC: 25.7 PG — LOW (ref 27–34)
MCHC RBC-ENTMCNC: 25.7 PG — LOW (ref 27–34)
MCHC RBC-ENTMCNC: 25.9 PG — LOW (ref 27–34)
MCHC RBC-ENTMCNC: 26 PG — LOW (ref 27–34)
MCHC RBC-ENTMCNC: 26.1 PG — LOW (ref 27–34)
MCHC RBC-ENTMCNC: 26.2 PG — LOW (ref 27–34)
MCHC RBC-ENTMCNC: 26.2 PG — LOW (ref 27–34)
MCHC RBC-ENTMCNC: 26.3 PG — LOW (ref 27–34)
MCHC RBC-ENTMCNC: 26.4 PG — LOW (ref 27–34)
MCHC RBC-ENTMCNC: 26.6 PG — LOW (ref 27–34)
MCHC RBC-ENTMCNC: 26.8 PG — LOW (ref 27–34)
MCHC RBC-ENTMCNC: 26.8 PG — LOW (ref 27–34)
MCHC RBC-ENTMCNC: 26.9 PG — LOW (ref 27–34)
MCHC RBC-ENTMCNC: 26.9 PG — LOW (ref 27–34)
MCHC RBC-ENTMCNC: 27 PG — SIGNIFICANT CHANGE UP (ref 27–34)
MCHC RBC-ENTMCNC: 27.2 PG — SIGNIFICANT CHANGE UP (ref 27–34)
MCHC RBC-ENTMCNC: 27.2 PG — SIGNIFICANT CHANGE UP (ref 27–34)
MCHC RBC-ENTMCNC: 27.3 PG — SIGNIFICANT CHANGE UP (ref 27–34)
MCHC RBC-ENTMCNC: 27.4 PG — SIGNIFICANT CHANGE UP (ref 27–34)
MCHC RBC-ENTMCNC: 27.5 PG — SIGNIFICANT CHANGE UP (ref 27–34)
MCHC RBC-ENTMCNC: 27.6 PG — SIGNIFICANT CHANGE UP (ref 27–34)
MCHC RBC-ENTMCNC: 27.7 PG — SIGNIFICANT CHANGE UP (ref 27–34)
MCHC RBC-ENTMCNC: 27.8 PG — SIGNIFICANT CHANGE UP (ref 27–34)
MCHC RBC-ENTMCNC: 27.9 PG — SIGNIFICANT CHANGE UP (ref 27–34)
MCHC RBC-ENTMCNC: 28 PG — SIGNIFICANT CHANGE UP (ref 27–34)
MCHC RBC-ENTMCNC: 28.1 PG — SIGNIFICANT CHANGE UP (ref 27–34)
MCHC RBC-ENTMCNC: 28.2 PG — SIGNIFICANT CHANGE UP (ref 27–34)
MCHC RBC-ENTMCNC: 28.3 PG — SIGNIFICANT CHANGE UP (ref 27–34)
MCHC RBC-ENTMCNC: 28.4 PG — SIGNIFICANT CHANGE UP (ref 27–34)
MCHC RBC-ENTMCNC: 28.5 PG — SIGNIFICANT CHANGE UP (ref 27–34)
MCHC RBC-ENTMCNC: 28.6 PG — SIGNIFICANT CHANGE UP (ref 27–34)
MCHC RBC-ENTMCNC: 28.6 PG — SIGNIFICANT CHANGE UP (ref 27–34)
MCHC RBC-ENTMCNC: 28.7 PG — SIGNIFICANT CHANGE UP (ref 27–34)
MCHC RBC-ENTMCNC: 28.8 PG — SIGNIFICANT CHANGE UP (ref 27–34)
MCHC RBC-ENTMCNC: 28.9 PG — SIGNIFICANT CHANGE UP (ref 27–34)
MCHC RBC-ENTMCNC: 28.9 PG — SIGNIFICANT CHANGE UP (ref 27–34)
MCHC RBC-ENTMCNC: 29 G/DL — LOW (ref 32–36)
MCHC RBC-ENTMCNC: 29 PG — SIGNIFICANT CHANGE UP (ref 27–34)
MCHC RBC-ENTMCNC: 29.1 G/DL — LOW (ref 32–36)
MCHC RBC-ENTMCNC: 29.1 PG — SIGNIFICANT CHANGE UP (ref 27–34)
MCHC RBC-ENTMCNC: 29.1 PG — SIGNIFICANT CHANGE UP (ref 27–34)
MCHC RBC-ENTMCNC: 29.2 PG — SIGNIFICANT CHANGE UP (ref 27–34)
MCHC RBC-ENTMCNC: 29.3 PG — SIGNIFICANT CHANGE UP (ref 27–34)
MCHC RBC-ENTMCNC: 29.4 G/DL — LOW (ref 32–36)
MCHC RBC-ENTMCNC: 29.5 G/DL — LOW (ref 32–36)
MCHC RBC-ENTMCNC: 29.5 PG — SIGNIFICANT CHANGE UP (ref 27–34)
MCHC RBC-ENTMCNC: 29.5 PG — SIGNIFICANT CHANGE UP (ref 27–34)
MCHC RBC-ENTMCNC: 29.6 G/DL — LOW (ref 32–36)
MCHC RBC-ENTMCNC: 29.6 PG — SIGNIFICANT CHANGE UP (ref 27–34)
MCHC RBC-ENTMCNC: 29.6 PG — SIGNIFICANT CHANGE UP (ref 27–34)
MCHC RBC-ENTMCNC: 29.7 G/DL — LOW (ref 32–36)
MCHC RBC-ENTMCNC: 29.7 PG — SIGNIFICANT CHANGE UP (ref 27–34)
MCHC RBC-ENTMCNC: 29.8 G/DL — LOW (ref 32–36)
MCHC RBC-ENTMCNC: 29.8 PG — SIGNIFICANT CHANGE UP (ref 27–34)
MCHC RBC-ENTMCNC: 29.9 G/DL — LOW (ref 32–36)
MCHC RBC-ENTMCNC: 29.9 PG — SIGNIFICANT CHANGE UP (ref 27–34)
MCHC RBC-ENTMCNC: 30 G/DL — LOW (ref 32–36)
MCHC RBC-ENTMCNC: 30.1 G/DL — LOW (ref 32–36)
MCHC RBC-ENTMCNC: 30.2 G/DL — LOW (ref 32–36)
MCHC RBC-ENTMCNC: 30.2 PG — SIGNIFICANT CHANGE UP (ref 27–34)
MCHC RBC-ENTMCNC: 30.3 G/DL — LOW (ref 32–36)
MCHC RBC-ENTMCNC: 30.3 G/DL — LOW (ref 32–36)
MCHC RBC-ENTMCNC: 30.5 G/DL — LOW (ref 32–36)
MCHC RBC-ENTMCNC: 30.5 G/DL — LOW (ref 32–36)
MCHC RBC-ENTMCNC: 30.5 PG — SIGNIFICANT CHANGE UP (ref 27–34)
MCHC RBC-ENTMCNC: 30.6 G/DL — LOW (ref 32–36)
MCHC RBC-ENTMCNC: 30.6 G/DL — LOW (ref 32–36)
MCHC RBC-ENTMCNC: 30.7 G/DL — LOW (ref 32–36)
MCHC RBC-ENTMCNC: 30.7 G/DL — LOW (ref 32–36)
MCHC RBC-ENTMCNC: 30.9 G/DL — LOW (ref 32–36)
MCHC RBC-ENTMCNC: 31 G/DL — LOW (ref 32–36)
MCHC RBC-ENTMCNC: 31.1 G/DL — LOW (ref 32–36)
MCHC RBC-ENTMCNC: 31.1 G/DL — LOW (ref 32–36)
MCHC RBC-ENTMCNC: 31.2 G/DL — LOW (ref 32–36)
MCHC RBC-ENTMCNC: 31.3 G/DL — LOW (ref 32–36)
MCHC RBC-ENTMCNC: 31.3 G/DL — LOW (ref 32–36)
MCHC RBC-ENTMCNC: 31.4 G/DL — LOW (ref 32–36)
MCHC RBC-ENTMCNC: 31.5 G/DL — LOW (ref 32–36)
MCHC RBC-ENTMCNC: 31.6 G/DL — LOW (ref 32–36)
MCHC RBC-ENTMCNC: 31.7 G/DL — LOW (ref 32–36)
MCHC RBC-ENTMCNC: 31.7 G/DL — LOW (ref 32–36)
MCHC RBC-ENTMCNC: 32 G/DL — SIGNIFICANT CHANGE UP (ref 32–36)
MCHC RBC-ENTMCNC: 32.1 G/DL — SIGNIFICANT CHANGE UP (ref 32–36)
MCHC RBC-ENTMCNC: 32.2 G/DL — SIGNIFICANT CHANGE UP (ref 32–36)
MCHC RBC-ENTMCNC: 32.4 G/DL — SIGNIFICANT CHANGE UP (ref 32–36)
MCHC RBC-ENTMCNC: 32.4 G/DL — SIGNIFICANT CHANGE UP (ref 32–36)
MCHC RBC-ENTMCNC: 32.5 G/DL — SIGNIFICANT CHANGE UP (ref 32–36)
MCHC RBC-ENTMCNC: 32.5 G/DL — SIGNIFICANT CHANGE UP (ref 32–36)
MCHC RBC-ENTMCNC: 32.6 G/DL — SIGNIFICANT CHANGE UP (ref 32–36)
MCHC RBC-ENTMCNC: 32.7 G/DL — SIGNIFICANT CHANGE UP (ref 32–36)
MCHC RBC-ENTMCNC: 32.7 G/DL — SIGNIFICANT CHANGE UP (ref 32–36)
MCHC RBC-ENTMCNC: 32.8 G/DL — SIGNIFICANT CHANGE UP (ref 32–36)
MCHC RBC-ENTMCNC: 32.8 G/DL — SIGNIFICANT CHANGE UP (ref 32–36)
MCHC RBC-ENTMCNC: 32.9 G/DL — SIGNIFICANT CHANGE UP (ref 32–36)
MCHC RBC-ENTMCNC: 32.9 G/DL — SIGNIFICANT CHANGE UP (ref 32–36)
MCHC RBC-ENTMCNC: 33 G/DL — SIGNIFICANT CHANGE UP (ref 32–36)
MCHC RBC-ENTMCNC: 33.1 G/DL — SIGNIFICANT CHANGE UP (ref 32–36)
MCHC RBC-ENTMCNC: 33.2 G/DL — SIGNIFICANT CHANGE UP (ref 32–36)
MCHC RBC-ENTMCNC: 33.3 G/DL — SIGNIFICANT CHANGE UP (ref 32–36)
MCHC RBC-ENTMCNC: 33.4 G/DL — SIGNIFICANT CHANGE UP (ref 32–36)
MCHC RBC-ENTMCNC: 33.5 G/DL — SIGNIFICANT CHANGE UP (ref 32–36)
MCHC RBC-ENTMCNC: 33.6 G/DL — SIGNIFICANT CHANGE UP (ref 32–36)
MCHC RBC-ENTMCNC: 33.7 G/DL — SIGNIFICANT CHANGE UP (ref 32–36)
MCHC RBC-ENTMCNC: 33.8 G/DL — SIGNIFICANT CHANGE UP (ref 32–36)
MCHC RBC-ENTMCNC: 33.8 G/DL — SIGNIFICANT CHANGE UP (ref 32–36)
MCHC RBC-ENTMCNC: 33.9 G/DL — SIGNIFICANT CHANGE UP (ref 32–36)
MCHC RBC-ENTMCNC: 34.1 G/DL — SIGNIFICANT CHANGE UP (ref 32–36)
MCHC RBC-ENTMCNC: 34.2 G/DL — SIGNIFICANT CHANGE UP (ref 32–36)
MCHC RBC-ENTMCNC: 34.4 G/DL — SIGNIFICANT CHANGE UP (ref 32–36)
MCHC RBC-ENTMCNC: 34.5 G/DL — SIGNIFICANT CHANGE UP (ref 32–36)
MCHC RBC-ENTMCNC: 34.6 G/DL — SIGNIFICANT CHANGE UP (ref 32–36)
MCHC RBC-ENTMCNC: 34.6 G/DL — SIGNIFICANT CHANGE UP (ref 32–36)
MCHC RBC-ENTMCNC: 34.7 G/DL — SIGNIFICANT CHANGE UP (ref 32–36)
MCHC RBC-ENTMCNC: 34.9 G/DL — SIGNIFICANT CHANGE UP (ref 32–36)
MCHC RBC-ENTMCNC: 35 G/DL — SIGNIFICANT CHANGE UP (ref 32–36)
MCHC RBC-ENTMCNC: 35.2 G/DL — SIGNIFICANT CHANGE UP (ref 32–36)
MCHC RBC-ENTMCNC: 35.2 G/DL — SIGNIFICANT CHANGE UP (ref 32–36)
MCHC RBC-ENTMCNC: 35.3 G/DL — SIGNIFICANT CHANGE UP (ref 32–36)
MCHC RBC-ENTMCNC: 35.5 G/DL — SIGNIFICANT CHANGE UP (ref 32–36)
MCHC RBC-ENTMCNC: 35.7 G/DL — SIGNIFICANT CHANGE UP (ref 32–36)
MCHC RBC-ENTMCNC: 36.1 G/DL — HIGH (ref 32–36)
MCHC RBC-ENTMCNC: 36.9 G/DL — HIGH (ref 32–36)
MCV RBC AUTO: 78.6 FL — LOW (ref 80–100)
MCV RBC AUTO: 78.8 FL — LOW (ref 80–100)
MCV RBC AUTO: 78.9 FL — LOW (ref 80–100)
MCV RBC AUTO: 78.9 FL — LOW (ref 80–100)
MCV RBC AUTO: 79.1 FL — LOW (ref 80–100)
MCV RBC AUTO: 79.2 FL — LOW (ref 80–100)
MCV RBC AUTO: 79.4 FL — LOW (ref 80–100)
MCV RBC AUTO: 79.7 FL — LOW (ref 80–100)
MCV RBC AUTO: 79.9 FL — LOW (ref 80–100)
MCV RBC AUTO: 79.9 FL — LOW (ref 80–100)
MCV RBC AUTO: 80 FL — SIGNIFICANT CHANGE UP (ref 80–100)
MCV RBC AUTO: 80 FL — SIGNIFICANT CHANGE UP (ref 80–100)
MCV RBC AUTO: 80.7 FL — SIGNIFICANT CHANGE UP (ref 80–100)
MCV RBC AUTO: 81.1 FL — SIGNIFICANT CHANGE UP (ref 80–100)
MCV RBC AUTO: 81.3 FL — SIGNIFICANT CHANGE UP (ref 80–100)
MCV RBC AUTO: 81.3 FL — SIGNIFICANT CHANGE UP (ref 80–100)
MCV RBC AUTO: 81.4 FL — SIGNIFICANT CHANGE UP (ref 80–100)
MCV RBC AUTO: 81.7 FL — SIGNIFICANT CHANGE UP (ref 80–100)
MCV RBC AUTO: 81.8 FL — SIGNIFICANT CHANGE UP (ref 80–100)
MCV RBC AUTO: 81.9 FL — SIGNIFICANT CHANGE UP (ref 80–100)
MCV RBC AUTO: 82 FL — SIGNIFICANT CHANGE UP (ref 80–100)
MCV RBC AUTO: 82.1 FL — SIGNIFICANT CHANGE UP (ref 80–100)
MCV RBC AUTO: 82.2 FL — SIGNIFICANT CHANGE UP (ref 80–100)
MCV RBC AUTO: 82.5 FL — SIGNIFICANT CHANGE UP (ref 80–100)
MCV RBC AUTO: 82.6 FL — SIGNIFICANT CHANGE UP (ref 80–100)
MCV RBC AUTO: 82.7 FL — SIGNIFICANT CHANGE UP (ref 80–100)
MCV RBC AUTO: 82.8 FL — SIGNIFICANT CHANGE UP (ref 80–100)
MCV RBC AUTO: 82.9 FL — SIGNIFICANT CHANGE UP (ref 80–100)
MCV RBC AUTO: 83 FL — SIGNIFICANT CHANGE UP (ref 80–100)
MCV RBC AUTO: 83.1 FL — SIGNIFICANT CHANGE UP (ref 80–100)
MCV RBC AUTO: 83.2 FL — SIGNIFICANT CHANGE UP (ref 80–100)
MCV RBC AUTO: 83.3 FL — SIGNIFICANT CHANGE UP (ref 80–100)
MCV RBC AUTO: 83.3 FL — SIGNIFICANT CHANGE UP (ref 80–100)
MCV RBC AUTO: 83.4 FL — SIGNIFICANT CHANGE UP (ref 80–100)
MCV RBC AUTO: 83.5 FL — SIGNIFICANT CHANGE UP (ref 80–100)
MCV RBC AUTO: 83.6 FL — SIGNIFICANT CHANGE UP (ref 80–100)
MCV RBC AUTO: 83.6 FL — SIGNIFICANT CHANGE UP (ref 80–100)
MCV RBC AUTO: 83.7 FL — SIGNIFICANT CHANGE UP (ref 80–100)
MCV RBC AUTO: 83.9 FL — SIGNIFICANT CHANGE UP (ref 80–100)
MCV RBC AUTO: 84.1 FL — SIGNIFICANT CHANGE UP (ref 80–100)
MCV RBC AUTO: 84.2 FL — SIGNIFICANT CHANGE UP (ref 80–100)
MCV RBC AUTO: 84.2 FL — SIGNIFICANT CHANGE UP (ref 80–100)
MCV RBC AUTO: 84.3 FL — SIGNIFICANT CHANGE UP (ref 80–100)
MCV RBC AUTO: 84.3 FL — SIGNIFICANT CHANGE UP (ref 80–100)
MCV RBC AUTO: 84.4 FL — SIGNIFICANT CHANGE UP (ref 80–100)
MCV RBC AUTO: 84.5 FL — SIGNIFICANT CHANGE UP (ref 80–100)
MCV RBC AUTO: 84.5 FL — SIGNIFICANT CHANGE UP (ref 80–100)
MCV RBC AUTO: 84.6 FL — SIGNIFICANT CHANGE UP (ref 80–100)
MCV RBC AUTO: 84.7 FL — SIGNIFICANT CHANGE UP (ref 80–100)
MCV RBC AUTO: 84.8 FL — SIGNIFICANT CHANGE UP (ref 80–100)
MCV RBC AUTO: 84.9 FL — SIGNIFICANT CHANGE UP (ref 80–100)
MCV RBC AUTO: 85 FL — SIGNIFICANT CHANGE UP (ref 80–100)
MCV RBC AUTO: 85.2 FL — SIGNIFICANT CHANGE UP (ref 80–100)
MCV RBC AUTO: 85.3 FL — SIGNIFICANT CHANGE UP (ref 80–100)
MCV RBC AUTO: 85.4 FL — SIGNIFICANT CHANGE UP (ref 80–100)
MCV RBC AUTO: 85.4 FL — SIGNIFICANT CHANGE UP (ref 80–100)
MCV RBC AUTO: 85.5 FL — SIGNIFICANT CHANGE UP (ref 80–100)
MCV RBC AUTO: 85.7 FL — SIGNIFICANT CHANGE UP (ref 80–100)
MCV RBC AUTO: 85.9 FL — SIGNIFICANT CHANGE UP (ref 80–100)
MCV RBC AUTO: 86.6 FL — SIGNIFICANT CHANGE UP (ref 80–100)
MCV RBC AUTO: 86.8 FL — SIGNIFICANT CHANGE UP (ref 80–100)
MCV RBC AUTO: 86.8 FL — SIGNIFICANT CHANGE UP (ref 80–100)
MCV RBC AUTO: 86.9 FL — SIGNIFICANT CHANGE UP (ref 80–100)
MCV RBC AUTO: 87 FL — SIGNIFICANT CHANGE UP (ref 80–100)
MCV RBC AUTO: 87 FL — SIGNIFICANT CHANGE UP (ref 80–100)
MCV RBC AUTO: 87.1 FL — SIGNIFICANT CHANGE UP (ref 80–100)
MCV RBC AUTO: 87.2 FL — SIGNIFICANT CHANGE UP (ref 80–100)
MCV RBC AUTO: 87.2 FL — SIGNIFICANT CHANGE UP (ref 80–100)
MCV RBC AUTO: 87.3 FL — SIGNIFICANT CHANGE UP (ref 80–100)
MCV RBC AUTO: 87.5 FL — SIGNIFICANT CHANGE UP (ref 80–100)
MCV RBC AUTO: 87.6 FL — SIGNIFICANT CHANGE UP (ref 80–100)
MCV RBC AUTO: 87.7 FL — SIGNIFICANT CHANGE UP (ref 80–100)
MCV RBC AUTO: 87.8 FL — SIGNIFICANT CHANGE UP (ref 80–100)
MCV RBC AUTO: 88 FL — SIGNIFICANT CHANGE UP (ref 80–100)
MCV RBC AUTO: 88 FL — SIGNIFICANT CHANGE UP (ref 80–100)
MCV RBC AUTO: 88.3 FL — SIGNIFICANT CHANGE UP (ref 80–100)
MCV RBC AUTO: 88.6 FL — SIGNIFICANT CHANGE UP (ref 80–100)
MCV RBC AUTO: 88.8 FL — SIGNIFICANT CHANGE UP (ref 80–100)
MCV RBC AUTO: 88.8 FL — SIGNIFICANT CHANGE UP (ref 80–100)
MCV RBC AUTO: 88.9 FL — SIGNIFICANT CHANGE UP (ref 80–100)
MCV RBC AUTO: 89.2 FL — SIGNIFICANT CHANGE UP (ref 80–100)
MCV RBC AUTO: 92 FL — SIGNIFICANT CHANGE UP (ref 80–100)
MELD SCORE WITH DIALYSIS: 28 POINTS — SIGNIFICANT CHANGE UP
MELD SCORE WITHOUT DIALYSIS: 15 POINTS — SIGNIFICANT CHANGE UP
METAMYELOCYTES # FLD: 1.8 % — HIGH (ref 0–0)
METAMYELOCYTES # FLD: 10 % — HIGH (ref 0–0)
METAMYELOCYTES # FLD: 2 % — HIGH (ref 0–0)
METAMYELOCYTES # FLD: 2.6 % — HIGH (ref 0–0)
METAMYELOCYTES # FLD: 3.3 % — HIGH (ref 0–0)
METAMYELOCYTES # FLD: 8 % — HIGH (ref 0–0)
METAMYELOCYTES # FLD: 9.1 % — HIGH (ref 0–0)
METAMYELOCYTES NFR BLD: 1.8 % — HIGH (ref 0–0)
METAMYELOCYTES NFR BLD: 10 % — HIGH (ref 0–0)
METAMYELOCYTES NFR BLD: 2 % — HIGH (ref 0–0)
METAMYELOCYTES NFR BLD: 2.6 % — HIGH (ref 0–0)
METAMYELOCYTES NFR BLD: 3.3 % — HIGH (ref 0–0)
METAMYELOCYTES NFR BLD: 8 % — HIGH (ref 0–0)
METAMYELOCYTES NFR BLD: 9.1 % — HIGH (ref 0–0)
METHOD TYPE: SIGNIFICANT CHANGE UP
MONOCYTES # BLD AUTO: 0 K/UL — SIGNIFICANT CHANGE UP (ref 0–0.9)
MONOCYTES # BLD AUTO: 0.02 K/UL — SIGNIFICANT CHANGE UP (ref 0–0.9)
MONOCYTES # BLD AUTO: 0.03 K/UL — SIGNIFICANT CHANGE UP (ref 0–0.9)
MONOCYTES # BLD AUTO: 0.07 K/UL — SIGNIFICANT CHANGE UP (ref 0–0.9)
MONOCYTES # BLD AUTO: 0.07 K/UL — SIGNIFICANT CHANGE UP (ref 0–0.9)
MONOCYTES # BLD AUTO: 0.1 K/UL — SIGNIFICANT CHANGE UP (ref 0–0.9)
MONOCYTES # BLD AUTO: 0.13 K/UL — SIGNIFICANT CHANGE UP (ref 0–0.9)
MONOCYTES # BLD AUTO: 0.13 K/UL — SIGNIFICANT CHANGE UP (ref 0–0.9)
MONOCYTES # BLD AUTO: 0.14 K/UL — SIGNIFICANT CHANGE UP (ref 0–0.9)
MONOCYTES # BLD AUTO: 0.15 K/UL — SIGNIFICANT CHANGE UP (ref 0–0.9)
MONOCYTES # BLD AUTO: 0.15 K/UL — SIGNIFICANT CHANGE UP (ref 0–0.9)
MONOCYTES # BLD AUTO: 0.16 K/UL — SIGNIFICANT CHANGE UP (ref 0–0.9)
MONOCYTES # BLD AUTO: 0.16 K/UL — SIGNIFICANT CHANGE UP (ref 0–0.9)
MONOCYTES # BLD AUTO: 0.17 K/UL — SIGNIFICANT CHANGE UP (ref 0–0.9)
MONOCYTES # BLD AUTO: 0.21 K/UL — SIGNIFICANT CHANGE UP (ref 0–0.9)
MONOCYTES # BLD AUTO: 0.21 K/UL — SIGNIFICANT CHANGE UP (ref 0–0.9)
MONOCYTES # BLD AUTO: 0.24 K/UL — SIGNIFICANT CHANGE UP (ref 0–0.9)
MONOCYTES # BLD AUTO: 0.24 K/UL — SIGNIFICANT CHANGE UP (ref 0–0.9)
MONOCYTES # BLD AUTO: 0.25 K/UL — SIGNIFICANT CHANGE UP (ref 0–0.9)
MONOCYTES # BLD AUTO: 0.25 K/UL — SIGNIFICANT CHANGE UP (ref 0–0.9)
MONOCYTES # BLD AUTO: 0.26 K/UL — SIGNIFICANT CHANGE UP (ref 0–0.9)
MONOCYTES # BLD AUTO: 0.26 K/UL — SIGNIFICANT CHANGE UP (ref 0–0.9)
MONOCYTES # BLD AUTO: 0.27 K/UL — SIGNIFICANT CHANGE UP (ref 0–0.9)
MONOCYTES # BLD AUTO: 0.54 K/UL — SIGNIFICANT CHANGE UP (ref 0–0.9)
MONOCYTES NFR BLD AUTO: 0 % — LOW (ref 2–14)
MONOCYTES NFR BLD AUTO: 0.9 % — LOW (ref 2–14)
MONOCYTES NFR BLD AUTO: 1.2 % — LOW (ref 2–14)
MONOCYTES NFR BLD AUTO: 1.5 % — LOW (ref 2–14)
MONOCYTES NFR BLD AUTO: 1.6 % — LOW (ref 2–14)
MONOCYTES NFR BLD AUTO: 1.6 % — LOW (ref 2–14)
MONOCYTES NFR BLD AUTO: 1.9 % — LOW (ref 2–14)
MONOCYTES NFR BLD AUTO: 10 % — SIGNIFICANT CHANGE UP (ref 2–14)
MONOCYTES NFR BLD AUTO: 2.3 % — SIGNIFICANT CHANGE UP (ref 2–14)
MONOCYTES NFR BLD AUTO: 2.4 % — SIGNIFICANT CHANGE UP (ref 2–14)
MONOCYTES NFR BLD AUTO: 2.5 % — SIGNIFICANT CHANGE UP (ref 2–14)
MONOCYTES NFR BLD AUTO: 2.5 % — SIGNIFICANT CHANGE UP (ref 2–14)
MONOCYTES NFR BLD AUTO: 2.6 % — SIGNIFICANT CHANGE UP (ref 2–14)
MONOCYTES NFR BLD AUTO: 2.6 % — SIGNIFICANT CHANGE UP (ref 2–14)
MONOCYTES NFR BLD AUTO: 2.7 % — SIGNIFICANT CHANGE UP (ref 2–14)
MONOCYTES NFR BLD AUTO: 2.8 % — SIGNIFICANT CHANGE UP (ref 2–14)
MONOCYTES NFR BLD AUTO: 2.8 % — SIGNIFICANT CHANGE UP (ref 2–14)
MONOCYTES NFR BLD AUTO: 3.1 % — SIGNIFICANT CHANGE UP (ref 2–14)
MONOCYTES NFR BLD AUTO: 3.4 % — SIGNIFICANT CHANGE UP (ref 2–14)
MONOCYTES NFR BLD AUTO: 3.4 % — SIGNIFICANT CHANGE UP (ref 2–14)
MONOCYTES NFR BLD AUTO: 3.5 % — SIGNIFICANT CHANGE UP (ref 2–14)
MONOCYTES NFR BLD AUTO: 5 % — SIGNIFICANT CHANGE UP (ref 2–14)
MONOCYTES NFR BLD AUTO: 6.7 % — SIGNIFICANT CHANGE UP (ref 2–14)
MONOCYTES NFR BLD AUTO: 7 % — SIGNIFICANT CHANGE UP (ref 2–14)
MRSA PCR RESULT.: SIGNIFICANT CHANGE UP
MYELOCYTES NFR BLD: 1.3 % — HIGH (ref 0–0)
MYELOCYTES NFR BLD: 2 % — HIGH (ref 0–0)
MYELOCYTES NFR BLD: 3.3 % — HIGH (ref 0–0)
MYELOCYTES NFR BLD: 3.6 % — HIGH (ref 0–0)
MYELOCYTES NFR BLD: 4 % — HIGH (ref 0–0)
MYELOCYTES NFR BLD: 4 % — HIGH (ref 0–0)
NEUTROPHILS # BLD AUTO: 0.67 K/UL — LOW (ref 1.8–7.4)
NEUTROPHILS # BLD AUTO: 1.04 K/UL — LOW (ref 1.8–7.4)
NEUTROPHILS # BLD AUTO: 1.53 K/UL — LOW (ref 1.8–7.4)
NEUTROPHILS # BLD AUTO: 1.57 K/UL — LOW (ref 1.8–7.4)
NEUTROPHILS # BLD AUTO: 1.85 K/UL — SIGNIFICANT CHANGE UP (ref 1.8–7.4)
NEUTROPHILS # BLD AUTO: 1.87 K/UL — SIGNIFICANT CHANGE UP (ref 1.8–7.4)
NEUTROPHILS # BLD AUTO: 10.7 K/UL — HIGH (ref 1.8–7.4)
NEUTROPHILS # BLD AUTO: 11.07 K/UL — HIGH (ref 1.8–7.4)
NEUTROPHILS # BLD AUTO: 3.11 K/UL — SIGNIFICANT CHANGE UP (ref 1.8–7.4)
NEUTROPHILS # BLD AUTO: 3.49 K/UL — SIGNIFICANT CHANGE UP (ref 1.8–7.4)
NEUTROPHILS # BLD AUTO: 3.9 K/UL — SIGNIFICANT CHANGE UP (ref 1.8–7.4)
NEUTROPHILS # BLD AUTO: 4.3 K/UL — SIGNIFICANT CHANGE UP (ref 1.8–7.4)
NEUTROPHILS # BLD AUTO: 4.41 K/UL — SIGNIFICANT CHANGE UP (ref 1.8–7.4)
NEUTROPHILS # BLD AUTO: 4.45 K/UL — SIGNIFICANT CHANGE UP (ref 1.8–7.4)
NEUTROPHILS # BLD AUTO: 5.55 K/UL — SIGNIFICANT CHANGE UP (ref 1.8–7.4)
NEUTROPHILS # BLD AUTO: 6.09 K/UL — SIGNIFICANT CHANGE UP (ref 1.8–7.4)
NEUTROPHILS # BLD AUTO: 6.49 K/UL — SIGNIFICANT CHANGE UP (ref 1.8–7.4)
NEUTROPHILS # BLD AUTO: 6.91 K/UL — SIGNIFICANT CHANGE UP (ref 1.8–7.4)
NEUTROPHILS # BLD AUTO: 7.18 K/UL — SIGNIFICANT CHANGE UP (ref 1.8–7.4)
NEUTROPHILS # BLD AUTO: 7.56 K/UL — HIGH (ref 1.8–7.4)
NEUTROPHILS # BLD AUTO: 7.76 K/UL — HIGH (ref 1.8–7.4)
NEUTROPHILS # BLD AUTO: 9.09 K/UL — HIGH (ref 1.8–7.4)
NEUTROPHILS # BLD AUTO: 9.13 K/UL — HIGH (ref 1.8–7.4)
NEUTROPHILS # BLD AUTO: 9.18 K/UL — HIGH (ref 1.8–7.4)
NEUTROPHILS # BLD AUTO: 9.5 K/UL — HIGH (ref 1.8–7.4)
NEUTROPHILS # BLD AUTO: 9.68 K/UL — HIGH (ref 1.8–7.4)
NEUTROPHILS NFR BLD AUTO: 50 % — SIGNIFICANT CHANGE UP (ref 43–77)
NEUTROPHILS NFR BLD AUTO: 55 % — SIGNIFICANT CHANGE UP (ref 43–77)
NEUTROPHILS NFR BLD AUTO: 60 % — SIGNIFICANT CHANGE UP (ref 43–77)
NEUTROPHILS NFR BLD AUTO: 62.7 % — SIGNIFICANT CHANGE UP (ref 43–77)
NEUTROPHILS NFR BLD AUTO: 81.7 % — HIGH (ref 43–77)
NEUTROPHILS NFR BLD AUTO: 82 % — HIGH (ref 43–77)
NEUTROPHILS NFR BLD AUTO: 84.8 % — HIGH (ref 43–77)
NEUTROPHILS NFR BLD AUTO: 85.4 % — HIGH (ref 43–77)
NEUTROPHILS NFR BLD AUTO: 86.7 % — HIGH (ref 43–77)
NEUTROPHILS NFR BLD AUTO: 89 % — HIGH (ref 43–77)
NEUTROPHILS NFR BLD AUTO: 89.5 % — HIGH (ref 43–77)
NEUTROPHILS NFR BLD AUTO: 91.2 % — HIGH (ref 43–77)
NEUTROPHILS NFR BLD AUTO: 93 % — HIGH (ref 43–77)
NEUTROPHILS NFR BLD AUTO: 93.1 % — HIGH (ref 43–77)
NEUTROPHILS NFR BLD AUTO: 93.4 % — HIGH (ref 43–77)
NEUTROPHILS NFR BLD AUTO: 93.7 % — HIGH (ref 43–77)
NEUTROPHILS NFR BLD AUTO: 94.4 % — HIGH (ref 43–77)
NEUTROPHILS NFR BLD AUTO: 94.7 % — HIGH (ref 43–77)
NEUTROPHILS NFR BLD AUTO: 94.7 % — HIGH (ref 43–77)
NEUTROPHILS NFR BLD AUTO: 94.8 % — HIGH (ref 43–77)
NEUTROPHILS NFR BLD AUTO: 95.1 % — HIGH (ref 43–77)
NEUTROPHILS NFR BLD AUTO: 95.1 % — HIGH (ref 43–77)
NEUTROPHILS NFR BLD AUTO: 95.3 % — HIGH (ref 43–77)
NEUTROPHILS NFR BLD AUTO: 95.4 % — HIGH (ref 43–77)
NEUTROPHILS NFR BLD AUTO: 95.6 % — HIGH (ref 43–77)
NEUTROPHILS NFR BLD AUTO: 95.6 % — HIGH (ref 43–77)
NEUTS BAND # BLD: 1.8 % — SIGNIFICANT CHANGE UP (ref 0–8)
NEUTS BAND # BLD: 13.7 % — HIGH (ref 0–8)
NEUTS BAND # BLD: 20 % — HIGH (ref 0–8)
NEUTS BAND # BLD: 20 % — HIGH (ref 0–8)
NEUTS BAND # BLD: 3.8 % — SIGNIFICANT CHANGE UP (ref 0–8)
NEUTS BAND # BLD: 36.7 % — HIGH (ref 0–8)
NEUTS BAND NFR BLD: 1.8 % — SIGNIFICANT CHANGE UP (ref 0–8)
NEUTS BAND NFR BLD: 13.7 % — HIGH (ref 0–8)
NEUTS BAND NFR BLD: 20 % — HIGH (ref 0–8)
NEUTS BAND NFR BLD: 20 % — HIGH (ref 0–8)
NEUTS BAND NFR BLD: 3.8 % — SIGNIFICANT CHANGE UP (ref 0–8)
NEUTS BAND NFR BLD: 36.7 % — HIGH (ref 0–8)
NITRITE UR-MCNC: NEGATIVE — SIGNIFICANT CHANGE UP
NITRITE UR-MCNC: POSITIVE
NRBC # BLD: 0 /100 WBCS — SIGNIFICANT CHANGE UP (ref 0–0)
NRBC # BLD: 1 /100 WBCS — HIGH (ref 0–0)
NRBC # BLD: 2 /100 WBCS — HIGH (ref 0–0)
NRBC # BLD: 22 /100 WBCS — HIGH (ref 0–0)
NRBC # BLD: 3 /100 WBCS — HIGH (ref 0–0)
NRBC # BLD: 4 /100 WBCS — HIGH (ref 0–0)
NRBC # BLD: 5 /100 WBCS — HIGH (ref 0–0)
NRBC # BLD: 6 /100 WBCS — HIGH (ref 0–0)
NRBC BLD-RTO: 0 /100 WBCS — SIGNIFICANT CHANGE UP (ref 0–0)
NRBC BLD-RTO: 1 /100 WBCS — HIGH (ref 0–0)
NRBC BLD-RTO: 2 /100 WBCS — HIGH (ref 0–0)
NRBC BLD-RTO: 22 /100 WBCS — HIGH (ref 0–0)
NRBC BLD-RTO: 3 /100 WBCS — HIGH (ref 0–0)
NRBC BLD-RTO: 4 /100 WBCS — HIGH (ref 0–0)
NRBC BLD-RTO: 5 /100 WBCS — HIGH (ref 0–0)
NRBC BLD-RTO: 6 /100 WBCS — HIGH (ref 0–0)
O2 CT VFR BLD CALC: 37 MMHG — SIGNIFICANT CHANGE UP (ref 30–65)
O2 CT VFR BLD CALC: 39 MMHG — SIGNIFICANT CHANGE UP (ref 30–65)
O2 CT VFR BLD CALC: 41 MMHG — SIGNIFICANT CHANGE UP (ref 30–65)
O2 CT VFR BLD CALC: 42 MMHG — SIGNIFICANT CHANGE UP (ref 30–65)
O2 CT VFR BLD CALC: 42 MMHG — SIGNIFICANT CHANGE UP (ref 30–65)
O2 CT VFR BLD CALC: 44 MMHG — SIGNIFICANT CHANGE UP (ref 30–65)
O2 CT VFR BLD CALC: 45 MMHG — SIGNIFICANT CHANGE UP (ref 30–65)
O2 CT VFR BLD CALC: 46 MMHG — SIGNIFICANT CHANGE UP (ref 30–65)
O2 CT VFR BLD CALC: 47 MMHG — SIGNIFICANT CHANGE UP (ref 30–65)
O2 CT VFR BLD CALC: 48 MMHG — SIGNIFICANT CHANGE UP (ref 30–65)
O2 CT VFR BLD CALC: 49 MMHG — SIGNIFICANT CHANGE UP (ref 30–65)
O2 CT VFR BLD CALC: 49 MMHG — SIGNIFICANT CHANGE UP (ref 30–65)
O2 CT VFR BLD CALC: 50 MMHG — SIGNIFICANT CHANGE UP (ref 30–65)
O2 CT VFR BLD CALC: 52 MMHG — SIGNIFICANT CHANGE UP (ref 30–65)
O2 CT VFR BLD CALC: 52 MMHG — SIGNIFICANT CHANGE UP (ref 30–65)
O2 CT VFR BLD CALC: 53 MMHG — SIGNIFICANT CHANGE UP (ref 30–65)
O2 CT VFR BLD CALC: 54 MMHG — SIGNIFICANT CHANGE UP (ref 30–65)
O2 CT VFR BLD CALC: 55 MMHG — SIGNIFICANT CHANGE UP (ref 30–65)
O2 CT VFR BLD CALC: 55 MMHG — SIGNIFICANT CHANGE UP (ref 30–65)
O2 CT VFR BLD CALC: 56 MMHG — SIGNIFICANT CHANGE UP (ref 30–65)
O2 CT VFR BLD CALC: 57 MMHG — SIGNIFICANT CHANGE UP (ref 30–65)
O2 CT VFR BLD CALC: 58 MMHG — SIGNIFICANT CHANGE UP (ref 30–65)
O2 CT VFR BLD CALC: 60 MMHG — SIGNIFICANT CHANGE UP (ref 30–65)
O2 CT VFR BLD CALC: 61 MMHG — SIGNIFICANT CHANGE UP (ref 30–65)
O2 CT VFR BLD CALC: 76 MMHG — HIGH (ref 30–65)
ORGANISM # SPEC MICROSCOPIC CNT: ABNORMAL
OVALOCYTES BLD QL SMEAR: SLIGHT — SIGNIFICANT CHANGE UP
P JIROVECII DNA L RESP QL NAA+NON-PROBE: NEGATIVE — SIGNIFICANT CHANGE UP
PAT CTL 2H: 32.4 SEC — SIGNIFICANT CHANGE UP (ref 24.5–36.6)
PAT CTL 2H: 37.9 SEC — HIGH (ref 24.5–36.6)
PCO2 BLDMV: 29 MMHG — LOW (ref 30–65)
PCO2 BLDMV: 30 MMHG — SIGNIFICANT CHANGE UP (ref 30–65)
PCO2 BLDMV: 31 MMHG — SIGNIFICANT CHANGE UP (ref 30–65)
PCO2 BLDMV: 32 MMHG — SIGNIFICANT CHANGE UP (ref 30–65)
PCO2 BLDMV: 32 MMHG — SIGNIFICANT CHANGE UP (ref 30–65)
PCO2 BLDMV: 33 MMHG — SIGNIFICANT CHANGE UP (ref 30–65)
PCO2 BLDMV: 34 MMHG — SIGNIFICANT CHANGE UP (ref 30–65)
PCO2 BLDMV: 35 MMHG — SIGNIFICANT CHANGE UP (ref 30–65)
PCO2 BLDMV: 36 MMHG — SIGNIFICANT CHANGE UP (ref 30–65)
PCO2 BLDMV: 37 MMHG — SIGNIFICANT CHANGE UP (ref 30–65)
PCO2 BLDMV: 38 MMHG — SIGNIFICANT CHANGE UP (ref 30–65)
PCO2 BLDMV: 39 MMHG — SIGNIFICANT CHANGE UP (ref 30–65)
PCO2 BLDMV: 40 MMHG — SIGNIFICANT CHANGE UP (ref 30–65)
PCO2 BLDMV: 40 MMHG — SIGNIFICANT CHANGE UP (ref 30–65)
PCO2 BLDMV: 43 MMHG — SIGNIFICANT CHANGE UP (ref 30–65)
PCO2 BLDMV: 44 MMHG — SIGNIFICANT CHANGE UP (ref 30–65)
PCO2 BLDMV: 44 MMHG — SIGNIFICANT CHANGE UP (ref 30–65)
PF4 HEPARIN CMPLX AB SER-ACNC: NEGATIVE — SIGNIFICANT CHANGE UP
PH BLDMV: 7.09 — CRITICAL LOW (ref 7.32–7.45)
PH BLDMV: 7.1 — CRITICAL LOW (ref 7.32–7.45)
PH BLDMV: 7.16 — CRITICAL LOW (ref 7.32–7.45)
PH BLDMV: 7.22 — LOW (ref 7.32–7.45)
PH BLDMV: 7.24 — LOW (ref 7.32–7.45)
PH BLDMV: 7.26 — LOW (ref 7.32–7.45)
PH BLDMV: 7.27 — LOW (ref 7.32–7.45)
PH BLDMV: 7.3 — LOW (ref 7.32–7.45)
PH BLDMV: 7.31 — LOW (ref 7.32–7.45)
PH BLDMV: 7.31 — LOW (ref 7.32–7.45)
PH BLDMV: 7.32 — SIGNIFICANT CHANGE UP (ref 7.32–7.45)
PH BLDMV: 7.33 — SIGNIFICANT CHANGE UP (ref 7.32–7.45)
PH BLDMV: 7.34 — SIGNIFICANT CHANGE UP (ref 7.32–7.45)
PH BLDMV: 7.35 — SIGNIFICANT CHANGE UP (ref 7.32–7.45)
PH BLDMV: 7.36 — SIGNIFICANT CHANGE UP (ref 7.32–7.45)
PH BLDMV: 7.37 — SIGNIFICANT CHANGE UP (ref 7.32–7.45)
PH BLDMV: 7.37 — SIGNIFICANT CHANGE UP (ref 7.32–7.45)
PH BLDMV: 7.38 — SIGNIFICANT CHANGE UP (ref 7.32–7.45)
PH BLDMV: 7.39 — SIGNIFICANT CHANGE UP (ref 7.32–7.45)
PH BLDMV: 7.4 — SIGNIFICANT CHANGE UP (ref 7.32–7.45)
PH BLDMV: 7.41 — SIGNIFICANT CHANGE UP (ref 7.32–7.45)
PH UR: 5 — SIGNIFICANT CHANGE UP (ref 5–8)
PH UR: 5.5 — SIGNIFICANT CHANGE UP (ref 5–8)
PH UR: 5.5 — SIGNIFICANT CHANGE UP (ref 5–8)
PHOSPHATE SERPL-MCNC: 1.6 MG/DL — LOW (ref 2.5–4.5)
PHOSPHATE SERPL-MCNC: 1.7 MG/DL — LOW (ref 2.5–4.5)
PHOSPHATE SERPL-MCNC: 1.8 MG/DL — LOW (ref 2.5–4.5)
PHOSPHATE SERPL-MCNC: 1.8 MG/DL — LOW (ref 2.5–4.5)
PHOSPHATE SERPL-MCNC: 1.9 MG/DL — LOW (ref 2.5–4.5)
PHOSPHATE SERPL-MCNC: 2 MG/DL — LOW (ref 2.5–4.5)
PHOSPHATE SERPL-MCNC: 2 MG/DL — LOW (ref 2.5–4.5)
PHOSPHATE SERPL-MCNC: 2.1 MG/DL — LOW (ref 2.5–4.5)
PHOSPHATE SERPL-MCNC: 2.2 MG/DL — LOW (ref 2.5–4.5)
PHOSPHATE SERPL-MCNC: 2.3 MG/DL — LOW (ref 2.5–4.5)
PHOSPHATE SERPL-MCNC: 2.3 MG/DL — LOW (ref 2.5–4.5)
PHOSPHATE SERPL-MCNC: 2.4 MG/DL — LOW (ref 2.5–4.5)
PHOSPHATE SERPL-MCNC: 2.5 MG/DL — SIGNIFICANT CHANGE UP (ref 2.5–4.5)
PHOSPHATE SERPL-MCNC: 2.6 MG/DL — SIGNIFICANT CHANGE UP (ref 2.5–4.5)
PHOSPHATE SERPL-MCNC: 2.7 MG/DL — SIGNIFICANT CHANGE UP (ref 2.5–4.5)
PHOSPHATE SERPL-MCNC: 2.8 MG/DL — SIGNIFICANT CHANGE UP (ref 2.5–4.5)
PHOSPHATE SERPL-MCNC: 2.9 MG/DL — SIGNIFICANT CHANGE UP (ref 2.5–4.5)
PHOSPHATE SERPL-MCNC: 3 MG/DL — SIGNIFICANT CHANGE UP (ref 2.5–4.5)
PHOSPHATE SERPL-MCNC: 3.1 MG/DL — SIGNIFICANT CHANGE UP (ref 2.5–4.5)
PHOSPHATE SERPL-MCNC: 3.2 MG/DL — SIGNIFICANT CHANGE UP (ref 2.5–4.5)
PHOSPHATE SERPL-MCNC: 3.3 MG/DL — SIGNIFICANT CHANGE UP (ref 2.5–4.5)
PHOSPHATE SERPL-MCNC: 3.4 MG/DL — SIGNIFICANT CHANGE UP (ref 2.5–4.5)
PHOSPHATE SERPL-MCNC: 3.5 MG/DL — SIGNIFICANT CHANGE UP (ref 2.5–4.5)
PHOSPHATE SERPL-MCNC: 3.6 MG/DL — SIGNIFICANT CHANGE UP (ref 2.5–4.5)
PHOSPHATE SERPL-MCNC: 3.7 MG/DL — SIGNIFICANT CHANGE UP (ref 2.5–4.5)
PHOSPHATE SERPL-MCNC: 3.8 MG/DL — SIGNIFICANT CHANGE UP (ref 2.5–4.5)
PHOSPHATE SERPL-MCNC: 3.9 MG/DL — SIGNIFICANT CHANGE UP (ref 2.5–4.5)
PHOSPHATE SERPL-MCNC: 4 MG/DL — SIGNIFICANT CHANGE UP (ref 2.5–4.5)
PHOSPHATE SERPL-MCNC: 4 MG/DL — SIGNIFICANT CHANGE UP (ref 2.5–4.5)
PHOSPHATE SERPL-MCNC: 4.1 MG/DL — SIGNIFICANT CHANGE UP (ref 2.5–4.5)
PHOSPHATE SERPL-MCNC: 4.2 MG/DL — SIGNIFICANT CHANGE UP (ref 2.5–4.5)
PHOSPHATE SERPL-MCNC: 4.3 MG/DL — SIGNIFICANT CHANGE UP (ref 2.5–4.5)
PHOSPHATE SERPL-MCNC: 4.5 MG/DL — SIGNIFICANT CHANGE UP (ref 2.5–4.5)
PHOSPHATE SERPL-MCNC: 4.5 MG/DL — SIGNIFICANT CHANGE UP (ref 2.5–4.5)
PHOSPHATE SERPL-MCNC: 4.6 MG/DL — HIGH (ref 2.5–4.5)
PHOSPHATE SERPL-MCNC: 4.7 MG/DL — HIGH (ref 2.5–4.5)
PHOSPHATE SERPL-MCNC: 4.8 MG/DL — HIGH (ref 2.5–4.5)
PHOSPHATE SERPL-MCNC: 4.8 MG/DL — HIGH (ref 2.5–4.5)
PHOSPHATE SERPL-MCNC: 4.9 MG/DL — HIGH (ref 2.5–4.5)
PHOSPHATE SERPL-MCNC: 5.3 MG/DL — HIGH (ref 2.5–4.5)
PHOSPHATE SERPL-MCNC: 5.3 MG/DL — HIGH (ref 2.5–4.5)
PHOSPHATE SERPL-MCNC: 5.6 MG/DL — HIGH (ref 2.5–4.5)
PHOSPHATE SERPL-MCNC: 5.7 MG/DL — HIGH (ref 2.5–4.5)
PHOSPHATE SERPL-MCNC: 5.8 MG/DL — HIGH (ref 2.5–4.5)
PHOSPHATE SERPL-MCNC: 5.9 MG/DL — HIGH (ref 2.5–4.5)
PHOSPHATE SERPL-MCNC: 6 MG/DL — HIGH (ref 2.5–4.5)
PHOSPHATE SERPL-MCNC: 6.4 MG/DL — HIGH (ref 2.5–4.5)
PHOSPHATE SERPL-MCNC: 6.7 MG/DL — HIGH (ref 2.5–4.5)
PHOSPHATE SERPL-MCNC: 6.7 MG/DL — HIGH (ref 2.5–4.5)
PHOSPHATE SERPL-MCNC: 6.9 MG/DL — HIGH (ref 2.5–4.5)
PHOSPHATE SERPL-MCNC: 7 MG/DL — HIGH (ref 2.5–4.5)
PHOSPHATE SERPL-MCNC: 7.4 MG/DL — HIGH (ref 2.5–4.5)
PLAT MORPH BLD: ABNORMAL
PLAT MORPH BLD: ABNORMAL
PLAT MORPH BLD: NORMAL — SIGNIFICANT CHANGE UP
PLATELET # BLD AUTO: 105 K/UL — LOW (ref 150–400)
PLATELET # BLD AUTO: 106 K/UL — LOW (ref 150–400)
PLATELET # BLD AUTO: 106 K/UL — LOW (ref 150–400)
PLATELET # BLD AUTO: 114 K/UL — LOW (ref 150–400)
PLATELET # BLD AUTO: 117 K/UL — LOW (ref 150–400)
PLATELET # BLD AUTO: 120 K/UL — LOW (ref 150–400)
PLATELET # BLD AUTO: 127 K/UL — LOW (ref 150–400)
PLATELET # BLD AUTO: 13 K/UL — CRITICAL LOW (ref 150–400)
PLATELET # BLD AUTO: 135 K/UL — LOW (ref 150–400)
PLATELET # BLD AUTO: 136 K/UL — LOW (ref 150–400)
PLATELET # BLD AUTO: 136 K/UL — LOW (ref 150–400)
PLATELET # BLD AUTO: 145 K/UL — LOW (ref 150–400)
PLATELET # BLD AUTO: 145 K/UL — LOW (ref 150–400)
PLATELET # BLD AUTO: 146 K/UL — LOW (ref 150–400)
PLATELET # BLD AUTO: 147 K/UL — LOW (ref 150–400)
PLATELET # BLD AUTO: 149 K/UL — LOW (ref 150–400)
PLATELET # BLD AUTO: 151 K/UL — SIGNIFICANT CHANGE UP (ref 150–400)
PLATELET # BLD AUTO: 162 K/UL — SIGNIFICANT CHANGE UP (ref 150–400)
PLATELET # BLD AUTO: 17 K/UL — CRITICAL LOW (ref 150–400)
PLATELET # BLD AUTO: 170 K/UL — SIGNIFICANT CHANGE UP (ref 150–400)
PLATELET # BLD AUTO: 175 K/UL — SIGNIFICANT CHANGE UP (ref 150–400)
PLATELET # BLD AUTO: 177 K/UL — SIGNIFICANT CHANGE UP (ref 150–400)
PLATELET # BLD AUTO: 180 K/UL — SIGNIFICANT CHANGE UP (ref 150–400)
PLATELET # BLD AUTO: 187 K/UL — SIGNIFICANT CHANGE UP (ref 150–400)
PLATELET # BLD AUTO: 187 K/UL — SIGNIFICANT CHANGE UP (ref 150–400)
PLATELET # BLD AUTO: 188 K/UL — SIGNIFICANT CHANGE UP (ref 150–400)
PLATELET # BLD AUTO: 189 K/UL — SIGNIFICANT CHANGE UP (ref 150–400)
PLATELET # BLD AUTO: 19 K/UL — CRITICAL LOW (ref 150–400)
PLATELET # BLD AUTO: 19 K/UL — CRITICAL LOW (ref 150–400)
PLATELET # BLD AUTO: 195 K/UL — SIGNIFICANT CHANGE UP (ref 150–400)
PLATELET # BLD AUTO: 195 K/UL — SIGNIFICANT CHANGE UP (ref 150–400)
PLATELET # BLD AUTO: 198 K/UL — SIGNIFICANT CHANGE UP (ref 150–400)
PLATELET # BLD AUTO: 203 K/UL — SIGNIFICANT CHANGE UP (ref 150–400)
PLATELET # BLD AUTO: 209 K/UL — SIGNIFICANT CHANGE UP (ref 150–400)
PLATELET # BLD AUTO: 21 K/UL — LOW (ref 150–400)
PLATELET # BLD AUTO: 21 K/UL — LOW (ref 150–400)
PLATELET # BLD AUTO: 218 K/UL — SIGNIFICANT CHANGE UP (ref 150–400)
PLATELET # BLD AUTO: 22 K/UL — LOW (ref 150–400)
PLATELET # BLD AUTO: 225 K/UL — SIGNIFICANT CHANGE UP (ref 150–400)
PLATELET # BLD AUTO: 225 K/UL — SIGNIFICANT CHANGE UP (ref 150–400)
PLATELET # BLD AUTO: 227 K/UL — SIGNIFICANT CHANGE UP (ref 150–400)
PLATELET # BLD AUTO: 231 K/UL — SIGNIFICANT CHANGE UP (ref 150–400)
PLATELET # BLD AUTO: 232 K/UL — SIGNIFICANT CHANGE UP (ref 150–400)
PLATELET # BLD AUTO: 24 K/UL — LOW (ref 150–400)
PLATELET # BLD AUTO: 245 K/UL — SIGNIFICANT CHANGE UP (ref 150–400)
PLATELET # BLD AUTO: 25 K/UL — LOW (ref 150–400)
PLATELET # BLD AUTO: 25 K/UL — LOW (ref 150–400)
PLATELET # BLD AUTO: 251 K/UL — SIGNIFICANT CHANGE UP (ref 150–400)
PLATELET # BLD AUTO: 26 K/UL — LOW (ref 150–400)
PLATELET # BLD AUTO: 261 K/UL — SIGNIFICANT CHANGE UP (ref 150–400)
PLATELET # BLD AUTO: 27 K/UL — LOW (ref 150–400)
PLATELET # BLD AUTO: 28 K/UL — LOW (ref 150–400)
PLATELET # BLD AUTO: 29 K/UL — LOW (ref 150–400)
PLATELET # BLD AUTO: 292 K/UL — SIGNIFICANT CHANGE UP (ref 150–400)
PLATELET # BLD AUTO: 30 K/UL — LOW (ref 150–400)
PLATELET # BLD AUTO: 30 K/UL — LOW (ref 150–400)
PLATELET # BLD AUTO: 31 K/UL — LOW (ref 150–400)
PLATELET # BLD AUTO: 32 K/UL — LOW (ref 150–400)
PLATELET # BLD AUTO: 33 K/UL — LOW (ref 150–400)
PLATELET # BLD AUTO: 33 K/UL — LOW (ref 150–400)
PLATELET # BLD AUTO: 34 K/UL — LOW (ref 150–400)
PLATELET # BLD AUTO: 35 K/UL — LOW (ref 150–400)
PLATELET # BLD AUTO: 36 K/UL — LOW (ref 150–400)
PLATELET # BLD AUTO: 36 K/UL — LOW (ref 150–400)
PLATELET # BLD AUTO: 37 K/UL — LOW (ref 150–400)
PLATELET # BLD AUTO: 38 K/UL — LOW (ref 150–400)
PLATELET # BLD AUTO: 39 K/UL — LOW (ref 150–400)
PLATELET # BLD AUTO: 40 K/UL — LOW (ref 150–400)
PLATELET # BLD AUTO: 40 K/UL — LOW (ref 150–400)
PLATELET # BLD AUTO: 43 K/UL — LOW (ref 150–400)
PLATELET # BLD AUTO: 44 K/UL — LOW (ref 150–400)
PLATELET # BLD AUTO: 45 K/UL — LOW (ref 150–400)
PLATELET # BLD AUTO: 46 K/UL — LOW (ref 150–400)
PLATELET # BLD AUTO: 49 K/UL — LOW (ref 150–400)
PLATELET # BLD AUTO: 50 K/UL — LOW (ref 150–400)
PLATELET # BLD AUTO: 50 K/UL — LOW (ref 150–400)
PLATELET # BLD AUTO: 53 K/UL — LOW (ref 150–400)
PLATELET # BLD AUTO: 54 K/UL — LOW (ref 150–400)
PLATELET # BLD AUTO: 55 K/UL — LOW (ref 150–400)
PLATELET # BLD AUTO: 56 K/UL — LOW (ref 150–400)
PLATELET # BLD AUTO: 57 K/UL — LOW (ref 150–400)
PLATELET # BLD AUTO: 58 K/UL — LOW (ref 150–400)
PLATELET # BLD AUTO: 60 K/UL — LOW (ref 150–400)
PLATELET # BLD AUTO: 61 K/UL — LOW (ref 150–400)
PLATELET # BLD AUTO: 63 K/UL — LOW (ref 150–400)
PLATELET # BLD AUTO: 64 K/UL — LOW (ref 150–400)
PLATELET # BLD AUTO: 66 K/UL — LOW (ref 150–400)
PLATELET # BLD AUTO: 67 K/UL — LOW (ref 150–400)
PLATELET # BLD AUTO: 69 K/UL — LOW (ref 150–400)
PLATELET # BLD AUTO: 69 K/UL — LOW (ref 150–400)
PLATELET # BLD AUTO: 75 K/UL — LOW (ref 150–400)
PLATELET # BLD AUTO: 83 K/UL — LOW (ref 150–400)
PLATELET MAPPING ACTF MAX AMPLITUDE: 2.9 MM — SIGNIFICANT CHANGE UP (ref 2–19)
PLATELET MAPPING ACTF MAX AMPLITUDE: SIGNIFICANT CHANGE UP (ref 2–19)
PLATELET MAPPING ADP MAXIMUM AMPLITUDE: <10 MM — LOW (ref 45–69)
PLATELET MAPPING ADP MAXIMUM AMPLITUDE: <10 MM — LOW (ref 45–69)
PLATELET MAPPING ADP PERCENT INHIBITION: SIGNIFICANT CHANGE UP % (ref 0–17)
PLATELET MAPPING ADP PERCENT INHIBITION: SIGNIFICANT CHANGE UP (ref 0–17)
PLATELET MAPPING ARACHIDONIC ACID INHIBITION: SIGNIFICANT CHANGE UP % (ref 0–11)
PLATELET MAPPING ARACHIDONIC ACID INHIBITION: SIGNIFICANT CHANGE UP (ref 0–11)
PLATELET MAPPING HKH MAXIMUM AMPLITUDE: <42 MM — LOW (ref 53–68)
PLATELET MAPPING HKH MAXIMUM AMPLITUDE: <42 MM — LOW (ref 53–68)
POIKILOCYTOSIS BLD QL AUTO: SIGNIFICANT CHANGE UP
POIKILOCYTOSIS BLD QL AUTO: SIGNIFICANT CHANGE UP
POIKILOCYTOSIS BLD QL AUTO: SLIGHT — SIGNIFICANT CHANGE UP
POLYCHROMASIA BLD QL SMEAR: SLIGHT — SIGNIFICANT CHANGE UP
POTASSIUM SERPL-MCNC: 3 MMOL/L — LOW (ref 3.5–5.3)
POTASSIUM SERPL-MCNC: 3.1 MMOL/L — LOW (ref 3.5–5.3)
POTASSIUM SERPL-MCNC: 3.2 MMOL/L — LOW (ref 3.5–5.3)
POTASSIUM SERPL-MCNC: 3.3 MMOL/L — LOW (ref 3.5–5.3)
POTASSIUM SERPL-MCNC: 3.4 MMOL/L — LOW (ref 3.5–5.3)
POTASSIUM SERPL-MCNC: 3.5 MMOL/L — SIGNIFICANT CHANGE UP (ref 3.5–5.3)
POTASSIUM SERPL-MCNC: 3.6 MMOL/L — SIGNIFICANT CHANGE UP (ref 3.5–5.3)
POTASSIUM SERPL-MCNC: 3.7 MMOL/L — SIGNIFICANT CHANGE UP (ref 3.5–5.3)
POTASSIUM SERPL-MCNC: 3.8 MMOL/L — SIGNIFICANT CHANGE UP (ref 3.5–5.3)
POTASSIUM SERPL-MCNC: 3.9 MMOL/L — SIGNIFICANT CHANGE UP (ref 3.5–5.3)
POTASSIUM SERPL-MCNC: 4 MMOL/L — SIGNIFICANT CHANGE UP (ref 3.5–5.3)
POTASSIUM SERPL-MCNC: 4.1 MMOL/L — SIGNIFICANT CHANGE UP (ref 3.5–5.3)
POTASSIUM SERPL-MCNC: 4.2 MMOL/L — SIGNIFICANT CHANGE UP (ref 3.5–5.3)
POTASSIUM SERPL-MCNC: 4.3 MMOL/L — SIGNIFICANT CHANGE UP (ref 3.5–5.3)
POTASSIUM SERPL-MCNC: 4.4 MMOL/L — SIGNIFICANT CHANGE UP (ref 3.5–5.3)
POTASSIUM SERPL-MCNC: 4.5 MMOL/L — SIGNIFICANT CHANGE UP (ref 3.5–5.3)
POTASSIUM SERPL-MCNC: 4.6 MMOL/L — SIGNIFICANT CHANGE UP (ref 3.5–5.3)
POTASSIUM SERPL-MCNC: 4.7 MMOL/L — SIGNIFICANT CHANGE UP (ref 3.5–5.3)
POTASSIUM SERPL-MCNC: 4.8 MMOL/L — SIGNIFICANT CHANGE UP (ref 3.5–5.3)
POTASSIUM SERPL-MCNC: 7.1 MMOL/L — CRITICAL HIGH (ref 3.5–5.3)
POTASSIUM SERPL-SCNC: 3 MMOL/L — LOW (ref 3.5–5.3)
POTASSIUM SERPL-SCNC: 3.1 MMOL/L — LOW (ref 3.5–5.3)
POTASSIUM SERPL-SCNC: 3.2 MMOL/L — LOW (ref 3.5–5.3)
POTASSIUM SERPL-SCNC: 3.3 MMOL/L — LOW (ref 3.5–5.3)
POTASSIUM SERPL-SCNC: 3.4 MMOL/L — LOW (ref 3.5–5.3)
POTASSIUM SERPL-SCNC: 3.5 MMOL/L — SIGNIFICANT CHANGE UP (ref 3.5–5.3)
POTASSIUM SERPL-SCNC: 3.6 MMOL/L — SIGNIFICANT CHANGE UP (ref 3.5–5.3)
POTASSIUM SERPL-SCNC: 3.7 MMOL/L — SIGNIFICANT CHANGE UP (ref 3.5–5.3)
POTASSIUM SERPL-SCNC: 3.8 MMOL/L — SIGNIFICANT CHANGE UP (ref 3.5–5.3)
POTASSIUM SERPL-SCNC: 3.9 MMOL/L — SIGNIFICANT CHANGE UP (ref 3.5–5.3)
POTASSIUM SERPL-SCNC: 4 MMOL/L — SIGNIFICANT CHANGE UP (ref 3.5–5.3)
POTASSIUM SERPL-SCNC: 4.1 MMOL/L — SIGNIFICANT CHANGE UP (ref 3.5–5.3)
POTASSIUM SERPL-SCNC: 4.2 MMOL/L — SIGNIFICANT CHANGE UP (ref 3.5–5.3)
POTASSIUM SERPL-SCNC: 4.3 MMOL/L — SIGNIFICANT CHANGE UP (ref 3.5–5.3)
POTASSIUM SERPL-SCNC: 4.4 MMOL/L — SIGNIFICANT CHANGE UP (ref 3.5–5.3)
POTASSIUM SERPL-SCNC: 4.5 MMOL/L — SIGNIFICANT CHANGE UP (ref 3.5–5.3)
POTASSIUM SERPL-SCNC: 4.6 MMOL/L — SIGNIFICANT CHANGE UP (ref 3.5–5.3)
POTASSIUM SERPL-SCNC: 4.7 MMOL/L — SIGNIFICANT CHANGE UP (ref 3.5–5.3)
POTASSIUM SERPL-SCNC: 4.8 MMOL/L — SIGNIFICANT CHANGE UP (ref 3.5–5.3)
POTASSIUM SERPL-SCNC: 7.1 MMOL/L — CRITICAL HIGH (ref 3.5–5.3)
PROCALCITONIN SERPL-MCNC: 0.16 NG/ML — HIGH (ref 0.02–0.1)
PROCALCITONIN SERPL-MCNC: 0.18 NG/ML — HIGH (ref 0.02–0.1)
PROCALCITONIN SERPL-MCNC: 0.21 NG/ML — HIGH (ref 0.02–0.1)
PROCALCITONIN SERPL-MCNC: 0.21 NG/ML — HIGH (ref 0.02–0.1)
PROCALCITONIN SERPL-MCNC: 0.23 NG/ML — HIGH (ref 0.02–0.1)
PROCALCITONIN SERPL-MCNC: 0.32 NG/ML — HIGH (ref 0.02–0.1)
PROCALCITONIN SERPL-MCNC: 1.43 NG/ML — HIGH (ref 0.02–0.1)
PROCALCITONIN SERPL-MCNC: 2.2 NG/ML — HIGH (ref 0.02–0.1)
PROCALCITONIN SERPL-MCNC: 2.49 NG/ML — HIGH (ref 0.02–0.1)
PROCALCITONIN SERPL-MCNC: 2.84 NG/ML — HIGH (ref 0.02–0.1)
PROCALCITONIN SERPL-MCNC: 3.62 NG/ML — HIGH (ref 0.02–0.1)
PROCALCITONIN SERPL-MCNC: 4.45 NG/ML — HIGH (ref 0.02–0.1)
PROMYELOCYTES # FLD: 1 % — HIGH (ref 0–0)
PROMYELOCYTES NFR BLD: 1 % — HIGH (ref 0–0)
PROT SERPL-MCNC: 3.2 G/DL — LOW (ref 6–8.3)
PROT SERPL-MCNC: 3.4 G/DL — LOW (ref 6–8.3)
PROT SERPL-MCNC: 3.5 G/DL — LOW (ref 6–8.3)
PROT SERPL-MCNC: 3.6 G/DL — LOW (ref 6–8.3)
PROT SERPL-MCNC: 3.7 G/DL — LOW (ref 6–8.3)
PROT SERPL-MCNC: 3.8 G/DL — LOW (ref 6–8.3)
PROT SERPL-MCNC: 3.9 G/DL — LOW (ref 6–8.3)
PROT SERPL-MCNC: 4 G/DL — LOW (ref 6–8.3)
PROT SERPL-MCNC: 4.1 G/DL — LOW (ref 6–8.3)
PROT SERPL-MCNC: 4.2 G/DL — LOW (ref 6–8.3)
PROT SERPL-MCNC: 4.3 G/DL — LOW (ref 6–8.3)
PROT SERPL-MCNC: 4.4 G/DL — LOW (ref 6–8.3)
PROT SERPL-MCNC: 4.4 G/DL — LOW (ref 6–8.3)
PROT SERPL-MCNC: 4.5 G/DL — LOW (ref 6–8.3)
PROT SERPL-MCNC: 4.6 G/DL — LOW (ref 6–8.3)
PROT SERPL-MCNC: 4.7 G/DL — LOW (ref 6–8.3)
PROT SERPL-MCNC: 4.8 G/DL — LOW (ref 6–8.3)
PROT SERPL-MCNC: 4.9 G/DL — LOW (ref 6–8.3)
PROT SERPL-MCNC: 5 G/DL — LOW (ref 6–8.3)
PROT SERPL-MCNC: 5.1 G/DL — LOW (ref 6–8.3)
PROT SERPL-MCNC: 5.2 G/DL — LOW (ref 6–8.3)
PROT SERPL-MCNC: 5.2 G/DL — LOW (ref 6–8.3)
PROT SERPL-MCNC: 5.3 G/DL — LOW (ref 6–8.3)
PROT SERPL-MCNC: 5.6 G/DL — LOW (ref 6–8.3)
PROT SERPL-MCNC: 6.1 G/DL — SIGNIFICANT CHANGE UP (ref 6–8.3)
PROT UR-MCNC: 100 MG/DL
PROT UR-MCNC: 30 MG/DL
PROT UR-MCNC: 300 MG/DL
PROT UR-MCNC: NEGATIVE MG/DL — SIGNIFICANT CHANGE UP
PROT UR-MCNC: NEGATIVE MG/DL — SIGNIFICANT CHANGE UP
PROT UR-MCNC: SIGNIFICANT CHANGE UP MG/DL
PROTHROM AB SERPL-ACNC: 12.9 SEC — SIGNIFICANT CHANGE UP (ref 9.9–13.4)
PROTHROM AB SERPL-ACNC: 13 SEC — SIGNIFICANT CHANGE UP (ref 9.9–13.4)
PROTHROM AB SERPL-ACNC: 13.3 SEC — SIGNIFICANT CHANGE UP (ref 9.9–13.4)
PROTHROM AB SERPL-ACNC: 13.5 SEC — HIGH (ref 9.9–13.4)
PROTHROM AB SERPL-ACNC: 13.6 SEC — HIGH (ref 9.9–13.4)
PROTHROM AB SERPL-ACNC: 13.8 SEC — HIGH (ref 9.9–13.4)
PROTHROM AB SERPL-ACNC: 14 SEC — HIGH (ref 9.9–13.4)
PROTHROM AB SERPL-ACNC: 14.1 SEC — HIGH (ref 9.9–13.4)
PROTHROM AB SERPL-ACNC: 14.3 SEC — HIGH (ref 9.9–13.4)
PROTHROM AB SERPL-ACNC: 14.8 SEC — HIGH (ref 9.9–13.4)
PROTHROM AB SERPL-ACNC: 15 SEC — HIGH (ref 9.9–13.4)
PROTHROM AB SERPL-ACNC: 15.1 SEC — HIGH (ref 9.9–13.4)
PROTHROM AB SERPL-ACNC: 15.3 SEC — HIGH (ref 9.9–13.4)
PROTHROM AB SERPL-ACNC: 15.3 SEC — HIGH (ref 9.9–13.4)
PROTHROM AB SERPL-ACNC: 15.4 SEC — HIGH (ref 9.9–13.4)
PROTHROM AB SERPL-ACNC: 15.5 SEC — HIGH (ref 9.9–13.4)
PROTHROM AB SERPL-ACNC: 15.6 SEC — HIGH (ref 9.9–13.4)
PROTHROM AB SERPL-ACNC: 15.7 SEC — HIGH (ref 9.9–13.4)
PROTHROM AB SERPL-ACNC: 15.7 SEC — HIGH (ref 9.9–13.4)
PROTHROM AB SERPL-ACNC: 15.9 SEC — HIGH (ref 9.9–13.4)
PROTHROM AB SERPL-ACNC: 15.9 SEC — HIGH (ref 9.9–13.4)
PROTHROM AB SERPL-ACNC: 16.1 SEC — HIGH (ref 9.9–13.4)
PROTHROM AB SERPL-ACNC: 16.1 SEC — HIGH (ref 9.9–13.4)
PROTHROM AB SERPL-ACNC: 16.4 SEC — HIGH (ref 9.9–13.4)
PROTHROM AB SERPL-ACNC: 16.7 SEC — HIGH (ref 9.9–13.4)
PROTHROM AB SERPL-ACNC: 17.3 SEC — HIGH (ref 9.9–13.4)
PROTHROM AB SERPL-ACNC: 17.4 SEC — HIGH (ref 9.9–13.4)
PROTHROM AB SERPL-ACNC: 17.5 SEC — HIGH (ref 9.9–13.4)
PROTHROM AB SERPL-ACNC: 17.6 SEC — HIGH (ref 9.9–13.4)
PROTHROM AB SERPL-ACNC: 17.8 SEC — HIGH (ref 9.9–13.4)
PROTHROM AB SERPL-ACNC: 18 SEC — HIGH (ref 9.9–13.4)
PROTHROM AB SERPL-ACNC: 18 SEC — HIGH (ref 9.9–13.4)
PROTHROM AB SERPL-ACNC: 18.3 SEC — HIGH (ref 9.9–13.4)
PROTHROM AB SERPL-ACNC: 18.4 SEC — HIGH (ref 9.9–13.4)
PROTHROM AB SERPL-ACNC: 18.8 SEC — HIGH (ref 9.9–13.4)
PROTHROM AB SERPL-ACNC: 19 SEC — HIGH (ref 9.9–13.4)
PROTHROM AB SERPL-ACNC: 19.1 SEC — HIGH (ref 9.9–13.4)
PROTHROM AB SERPL-ACNC: 19.2 SEC — HIGH (ref 9.9–13.4)
PROTHROM AB SERPL-ACNC: 19.4 SEC — HIGH (ref 9.9–13.4)
PROTHROM AB SERPL-ACNC: 19.4 SEC — HIGH (ref 9.9–13.4)
PROTHROM AB SERPL-ACNC: 19.7 SEC — HIGH (ref 9.9–13.4)
PROTHROM AB SERPL-ACNC: 19.9 SEC — HIGH (ref 9.9–13.4)
PROTHROM AB SERPL-ACNC: 20 SEC — HIGH (ref 9.9–13.4)
PROTHROM AB SERPL-ACNC: 20.1 SEC — HIGH (ref 9.9–13.4)
PROTHROM AB SERPL-ACNC: 20.4 SEC — HIGH (ref 9.9–13.4)
PROTHROM AB SERPL-ACNC: 20.4 SEC — HIGH (ref 9.9–13.4)
PROTHROM AB SERPL-ACNC: 20.5 SEC — HIGH (ref 9.9–13.4)
PROTHROM AB SERPL-ACNC: 20.7 SEC — HIGH (ref 9.9–13.4)
PROTHROM AB SERPL-ACNC: 20.8 SEC — HIGH (ref 9.9–13.4)
PROTHROM AB SERPL-ACNC: 20.9 SEC — HIGH (ref 9.9–13.4)
PROTHROM AB SERPL-ACNC: 21.1 SEC — HIGH (ref 9.9–13.4)
PROTHROM AB SERPL-ACNC: 21.2 SEC — HIGH (ref 9.9–13.4)
PROTHROM AB SERPL-ACNC: 21.2 SEC — HIGH (ref 9.9–13.4)
PROTHROM AB SERPL-ACNC: 21.3 SEC — HIGH (ref 9.9–13.4)
PROTHROM AB SERPL-ACNC: 21.5 SEC — HIGH (ref 9.9–13.4)
PROTHROM AB SERPL-ACNC: 21.7 SEC — HIGH (ref 9.9–13.4)
PROTHROM AB SERPL-ACNC: 21.7 SEC — HIGH (ref 9.9–13.4)
PROTHROM AB SERPL-ACNC: 22 SEC — HIGH (ref 9.9–13.4)
PROTHROM AB SERPL-ACNC: 22.1 SEC — HIGH (ref 9.9–13.4)
PROTHROM AB SERPL-ACNC: 22.1 SEC — HIGH (ref 9.9–13.4)
PROTHROM AB SERPL-ACNC: 22.5 SEC — HIGH (ref 9.9–13.4)
PROTHROM AB SERPL-ACNC: 23.3 SEC — HIGH (ref 9.9–13.4)
PROTHROM AB SERPL-ACNC: 23.6 SEC — HIGH (ref 9.9–13.4)
PROTHROM AB SERPL-ACNC: 24.2 SEC — HIGH (ref 9.9–13.4)
PROTHROM AB SERPL-ACNC: 24.3 SEC — HIGH (ref 9.9–13.4)
PROTHROM AB SERPL-ACNC: 25.2 SEC — HIGH (ref 9.9–13.4)
PROTHROM AB SERPL-ACNC: 25.8 SEC — HIGH (ref 9.9–13.4)
PROTHROM AB SERPL-ACNC: 26.3 SEC — HIGH (ref 9.9–13.4)
PROTHROM AB SERPL-ACNC: 26.3 SEC — HIGH (ref 9.9–13.4)
PROTHROM AB SERPL-ACNC: 26.8 SEC — HIGH (ref 9.9–13.4)
PROTHROM AB SERPL-ACNC: 27.1 SEC — HIGH (ref 9.9–13.4)
PROTHROM AB SERPL-ACNC: 27.8 SEC — HIGH (ref 9.9–13.4)
PROTHROM AB SERPL-ACNC: 27.9 SEC — HIGH (ref 9.9–13.4)
PROTHROM AB SERPL-ACNC: 28.1 SEC — HIGH (ref 9.9–13.4)
PROTHROM AB SERPL-ACNC: 29.1 SEC — HIGH (ref 9.9–13.4)
PROTHROM AB SERPL-ACNC: 29.8 SEC — HIGH (ref 9.9–13.4)
PROTHROM AB SERPL-ACNC: 29.8 SEC — HIGH (ref 9.9–13.4)
PROTHROM AB SERPL-ACNC: 31.4 SEC — HIGH (ref 9.9–13.4)
PROTHROM AB SERPL-ACNC: 31.4 SEC — HIGH (ref 9.9–13.4)
PROTHROM AB SERPL-ACNC: 31.5 SEC — HIGH (ref 9.9–13.4)
PROTHROM AB SERPL-ACNC: 31.5 SEC — HIGH (ref 9.9–13.4)
PROTHROM AB SERPL-ACNC: 31.6 SEC — HIGH (ref 9.9–13.4)
PROTHROM AB SERPL-ACNC: 32.2 SEC — HIGH (ref 9.9–13.4)
PROTHROM AB SERPL-ACNC: 33.6 SEC — HIGH (ref 9.9–13.4)
PROTHROM AB SERPL-ACNC: 36.1 SEC — HIGH (ref 9.9–13.4)
PROTHROM AB SERPL-ACNC: 37 SEC — HIGH (ref 9.9–13.4)
PROTHROM AB SERPL-ACNC: 37.6 SEC — HIGH (ref 9.9–13.4)
PROTHROM AB SERPL-ACNC: 39.1 SEC — HIGH (ref 9.9–13.4)
PROTHROM AB SERPL-ACNC: 42.4 SEC — HIGH (ref 9.9–13.4)
PROTHROM AB SERPL-ACNC: 50.3 SEC — HIGH (ref 9.9–13.4)
PT 100%: 25.2 SEC — HIGH (ref 9.9–13.4)
PT 100%: 27.1 SEC — HIGH (ref 9.9–13.4)
PT 50/50: 13 SEC — SIGNIFICANT CHANGE UP (ref 9.9–14.4)
PT 50/50: 13.3 SEC — SIGNIFICANT CHANGE UP (ref 9.9–14.4)
PYRIDOXAL PHOS SERPL-MCNC: 10.5 UG/L — SIGNIFICANT CHANGE UP (ref 3.4–65.2)
RAPID RVP RESULT: SIGNIFICANT CHANGE UP
RAPIDTEG MAXIMUM AMPLITUDE: 40.3 MM — LOW (ref 52–70)
RAPIDTEG MAXIMUM AMPLITUDE: 41.4 MM — LOW (ref 52–70)
RAPIDTEG MAXIMUM AMPLITUDE: 41.7 MM — LOW (ref 52–70)
RAPIDTEG MAXIMUM AMPLITUDE: 41.8 MM — LOW (ref 52–70)
RAPIDTEG MAXIMUM AMPLITUDE: 42.2 MM — LOW (ref 52–70)
RAPIDTEG MAXIMUM AMPLITUDE: 43.7 MM — LOW (ref 52–70)
RAPIDTEG MAXIMUM AMPLITUDE: 43.7 MM — LOW (ref 52–70)
RAPIDTEG MAXIMUM AMPLITUDE: 44 MM — LOW (ref 52–70)
RAPIDTEG MAXIMUM AMPLITUDE: 44.4 MM — LOW (ref 52–70)
RAPIDTEG MAXIMUM AMPLITUDE: 45.8 MM — LOW (ref 52–70)
RAPIDTEG MAXIMUM AMPLITUDE: 46 MM — LOW (ref 52–70)
RAPIDTEG MAXIMUM AMPLITUDE: 46.1 MM — LOW (ref 52–70)
RAPIDTEG MAXIMUM AMPLITUDE: 46.2 MM — LOW (ref 52–70)
RAPIDTEG MAXIMUM AMPLITUDE: 46.7 MM — LOW (ref 52–70)
RAPIDTEG MAXIMUM AMPLITUDE: 46.7 MM — LOW (ref 52–70)
RAPIDTEG MAXIMUM AMPLITUDE: 47 MM — LOW (ref 52–70)
RAPIDTEG MAXIMUM AMPLITUDE: 47 MM — LOW (ref 52–70)
RAPIDTEG MAXIMUM AMPLITUDE: 48.5 MM — LOW (ref 52–70)
RAPIDTEG MAXIMUM AMPLITUDE: 48.6 MM — LOW (ref 52–70)
RAPIDTEG MAXIMUM AMPLITUDE: 50.2 MM — LOW (ref 52–70)
RAPIDTEG MAXIMUM AMPLITUDE: 50.4 MM — LOW (ref 52–70)
RAPIDTEG MAXIMUM AMPLITUDE: 50.7 MM — LOW (ref 52–70)
RAPIDTEG MAXIMUM AMPLITUDE: 51.4 MM — LOW (ref 52–70)
RAPIDTEG MAXIMUM AMPLITUDE: 52.2 MM — SIGNIFICANT CHANGE UP (ref 52–70)
RAPIDTEG MAXIMUM AMPLITUDE: 53.2 MM — SIGNIFICANT CHANGE UP (ref 52–70)
RAPIDTEG MAXIMUM AMPLITUDE: 56 MM — SIGNIFICANT CHANGE UP (ref 52–70)
RAPIDTEG MAXIMUM AMPLITUDE: 59.5 MM — SIGNIFICANT CHANGE UP (ref 52–70)
RAPIDTEG MAXIMUM AMPLITUDE: <40 MM — LOW (ref 52–70)
RBC # BLD: 2.35 M/UL — LOW (ref 4.2–5.8)
RBC # BLD: 2.47 M/UL — LOW (ref 4.2–5.8)
RBC # BLD: 2.49 M/UL — LOW (ref 4.2–5.8)
RBC # BLD: 2.5 M/UL — LOW (ref 4.2–5.8)
RBC # BLD: 2.55 M/UL — LOW (ref 4.2–5.8)
RBC # BLD: 2.61 M/UL — LOW (ref 4.2–5.8)
RBC # BLD: 2.64 M/UL — LOW (ref 4.2–5.8)
RBC # BLD: 2.65 M/UL — LOW (ref 4.2–5.8)
RBC # BLD: 2.67 M/UL — LOW (ref 4.2–5.8)
RBC # BLD: 2.7 M/UL — LOW (ref 4.2–5.8)
RBC # BLD: 2.71 M/UL — LOW (ref 4.2–5.8)
RBC # BLD: 2.72 M/UL — LOW (ref 4.2–5.8)
RBC # BLD: 2.74 M/UL — LOW (ref 4.2–5.8)
RBC # BLD: 2.77 M/UL — LOW (ref 4.2–5.8)
RBC # BLD: 2.78 M/UL — LOW (ref 4.2–5.8)
RBC # BLD: 2.8 M/UL — LOW (ref 4.2–5.8)
RBC # BLD: 2.82 M/UL — LOW (ref 4.2–5.8)
RBC # BLD: 2.83 M/UL — LOW (ref 4.2–5.8)
RBC # BLD: 2.84 M/UL — LOW (ref 4.2–5.8)
RBC # BLD: 2.84 M/UL — LOW (ref 4.2–5.8)
RBC # BLD: 2.85 M/UL — LOW (ref 4.2–5.8)
RBC # BLD: 2.85 M/UL — LOW (ref 4.2–5.8)
RBC # BLD: 2.9 M/UL — LOW (ref 4.2–5.8)
RBC # BLD: 2.91 M/UL — LOW (ref 4.2–5.8)
RBC # BLD: 2.91 M/UL — LOW (ref 4.2–5.8)
RBC # BLD: 2.94 M/UL — LOW (ref 4.2–5.8)
RBC # BLD: 2.96 M/UL — LOW (ref 4.2–5.8)
RBC # BLD: 3 M/UL — LOW (ref 4.2–5.8)
RBC # BLD: 3.01 M/UL — LOW (ref 4.2–5.8)
RBC # BLD: 3.01 M/UL — LOW (ref 4.2–5.8)
RBC # BLD: 3.02 M/UL — LOW (ref 4.2–5.8)
RBC # BLD: 3.05 M/UL — LOW (ref 4.2–5.8)
RBC # BLD: 3.06 M/UL — LOW (ref 4.2–5.8)
RBC # BLD: 3.08 M/UL — LOW (ref 4.2–5.8)
RBC # BLD: 3.13 M/UL — LOW (ref 4.2–5.8)
RBC # BLD: 3.14 M/UL — LOW (ref 4.2–5.8)
RBC # BLD: 3.14 M/UL — LOW (ref 4.2–5.8)
RBC # BLD: 3.15 M/UL — LOW (ref 4.2–5.8)
RBC # BLD: 3.17 M/UL — LOW (ref 4.2–5.8)
RBC # BLD: 3.18 M/UL — LOW (ref 4.2–5.8)
RBC # BLD: 3.2 M/UL — LOW (ref 4.2–5.8)
RBC # BLD: 3.28 M/UL — LOW (ref 4.2–5.8)
RBC # BLD: 3.3 M/UL — LOW (ref 4.2–5.8)
RBC # BLD: 3.3 M/UL — LOW (ref 4.2–5.8)
RBC # BLD: 3.31 M/UL — LOW (ref 4.2–5.8)
RBC # BLD: 3.32 M/UL — LOW (ref 4.2–5.8)
RBC # BLD: 3.34 M/UL — LOW (ref 4.2–5.8)
RBC # BLD: 3.37 M/UL — LOW (ref 4.2–5.8)
RBC # BLD: 3.39 M/UL — LOW (ref 4.2–5.8)
RBC # BLD: 3.4 M/UL — LOW (ref 4.2–5.8)
RBC # BLD: 3.43 M/UL — LOW (ref 4.2–5.8)
RBC # BLD: 3.45 M/UL — LOW (ref 4.2–5.8)
RBC # BLD: 3.48 M/UL — LOW (ref 4.2–5.8)
RBC # BLD: 3.5 M/UL — LOW (ref 4.2–5.8)
RBC # BLD: 3.51 M/UL — LOW (ref 4.2–5.8)
RBC # BLD: 3.52 M/UL — LOW (ref 4.2–5.8)
RBC # BLD: 3.53 M/UL — LOW (ref 4.2–5.8)
RBC # BLD: 3.53 M/UL — LOW (ref 4.2–5.8)
RBC # BLD: 3.54 M/UL — LOW (ref 4.2–5.8)
RBC # BLD: 3.55 M/UL — LOW (ref 4.2–5.8)
RBC # BLD: 3.57 M/UL — LOW (ref 4.2–5.8)
RBC # BLD: 3.58 M/UL — LOW (ref 4.2–5.8)
RBC # BLD: 3.58 M/UL — LOW (ref 4.2–5.8)
RBC # BLD: 3.6 M/UL — LOW (ref 4.2–5.8)
RBC # BLD: 3.6 M/UL — LOW (ref 4.2–5.8)
RBC # BLD: 3.61 M/UL — LOW (ref 4.2–5.8)
RBC # BLD: 3.64 M/UL — LOW (ref 4.2–5.8)
RBC # BLD: 3.66 M/UL — LOW (ref 4.2–5.8)
RBC # BLD: 3.66 M/UL — LOW (ref 4.2–5.8)
RBC # BLD: 3.67 M/UL — LOW (ref 4.2–5.8)
RBC # BLD: 3.67 M/UL — LOW (ref 4.2–5.8)
RBC # BLD: 3.68 M/UL — LOW (ref 4.2–5.8)
RBC # BLD: 3.69 M/UL — LOW (ref 4.2–5.8)
RBC # BLD: 3.7 M/UL — LOW (ref 4.2–5.8)
RBC # BLD: 3.71 M/UL — LOW (ref 4.2–5.8)
RBC # BLD: 3.74 M/UL — LOW (ref 4.2–5.8)
RBC # BLD: 3.76 M/UL — LOW (ref 4.2–5.8)
RBC # BLD: 3.77 M/UL — LOW (ref 4.2–5.8)
RBC # BLD: 3.79 M/UL — LOW (ref 4.2–5.8)
RBC # BLD: 3.79 M/UL — LOW (ref 4.2–5.8)
RBC # BLD: 3.82 M/UL — LOW (ref 4.2–5.8)
RBC # BLD: 3.83 M/UL — LOW (ref 4.2–5.8)
RBC # BLD: 3.84 M/UL — LOW (ref 4.2–5.8)
RBC # BLD: 3.85 M/UL — LOW (ref 4.2–5.8)
RBC # BLD: 3.86 M/UL — LOW (ref 4.2–5.8)
RBC # BLD: 3.87 M/UL — LOW (ref 4.2–5.8)
RBC # BLD: 3.87 M/UL — LOW (ref 4.2–5.8)
RBC # BLD: 3.89 M/UL — LOW (ref 4.2–5.8)
RBC # BLD: 3.91 M/UL — LOW (ref 4.2–5.8)
RBC # BLD: 3.91 M/UL — LOW (ref 4.2–5.8)
RBC # BLD: 3.92 M/UL — LOW (ref 4.2–5.8)
RBC # BLD: 3.94 M/UL — LOW (ref 4.2–5.8)
RBC # BLD: 3.96 M/UL — LOW (ref 4.2–5.8)
RBC # BLD: 3.97 M/UL — LOW (ref 4.2–5.8)
RBC # BLD: 3.98 M/UL — LOW (ref 4.2–5.8)
RBC # BLD: 4.02 M/UL — LOW (ref 4.2–5.8)
RBC # BLD: 4.03 M/UL — LOW (ref 4.2–5.8)
RBC # BLD: 4.03 M/UL — LOW (ref 4.2–5.8)
RBC # BLD: 4.08 M/UL — LOW (ref 4.2–5.8)
RBC # BLD: 4.1 M/UL — LOW (ref 4.2–5.8)
RBC # BLD: 4.12 M/UL — LOW (ref 4.2–5.8)
RBC # BLD: 4.14 M/UL — LOW (ref 4.2–5.8)
RBC # BLD: 4.15 M/UL — LOW (ref 4.2–5.8)
RBC # BLD: 4.15 M/UL — LOW (ref 4.2–5.8)
RBC # BLD: 4.17 M/UL — LOW (ref 4.2–5.8)
RBC # BLD: 4.17 M/UL — LOW (ref 4.2–5.8)
RBC # BLD: 4.18 M/UL — LOW (ref 4.2–5.8)
RBC # BLD: 4.2 M/UL — SIGNIFICANT CHANGE UP (ref 4.2–5.8)
RBC # BLD: 4.34 M/UL — SIGNIFICANT CHANGE UP (ref 4.2–5.8)
RBC # BLD: 4.39 M/UL — SIGNIFICANT CHANGE UP (ref 4.2–5.8)
RBC # BLD: 4.44 M/UL — SIGNIFICANT CHANGE UP (ref 4.2–5.8)
RBC # BLD: 4.48 M/UL — SIGNIFICANT CHANGE UP (ref 4.2–5.8)
RBC # BLD: 4.53 M/UL — SIGNIFICANT CHANGE UP (ref 4.2–5.8)
RBC # BLD: 4.67 M/UL — SIGNIFICANT CHANGE UP (ref 4.2–5.8)
RBC # BLD: 4.91 M/UL — SIGNIFICANT CHANGE UP (ref 4.2–5.8)
RBC # FLD: 15.2 % — HIGH (ref 10.3–14.5)
RBC # FLD: 15.2 % — HIGH (ref 10.3–14.5)
RBC # FLD: 15.4 % — HIGH (ref 10.3–14.5)
RBC # FLD: 15.4 % — HIGH (ref 10.3–14.5)
RBC # FLD: 15.7 % — HIGH (ref 10.3–14.5)
RBC # FLD: 15.8 % — HIGH (ref 10.3–14.5)
RBC # FLD: 15.9 % — HIGH (ref 10.3–14.5)
RBC # FLD: 16.1 % — HIGH (ref 10.3–14.5)
RBC # FLD: 16.3 % — HIGH (ref 10.3–14.5)
RBC # FLD: 16.4 % — HIGH (ref 10.3–14.5)
RBC # FLD: 16.4 % — HIGH (ref 10.3–14.5)
RBC # FLD: 16.5 % — HIGH (ref 10.3–14.5)
RBC # FLD: 16.5 % — HIGH (ref 10.3–14.5)
RBC # FLD: 16.6 % — HIGH (ref 10.3–14.5)
RBC # FLD: 16.7 % — HIGH (ref 10.3–14.5)
RBC # FLD: 16.8 % — HIGH (ref 10.3–14.5)
RBC # FLD: 16.9 % — HIGH (ref 10.3–14.5)
RBC # FLD: 17 % — HIGH (ref 10.3–14.5)
RBC # FLD: 17 % — HIGH (ref 10.3–14.5)
RBC # FLD: 17.3 % — HIGH (ref 10.3–14.5)
RBC # FLD: 17.8 % — HIGH (ref 10.3–14.5)
RBC # FLD: 17.9 % — HIGH (ref 10.3–14.5)
RBC # FLD: 17.9 % — HIGH (ref 10.3–14.5)
RBC # FLD: 18 % — HIGH (ref 10.3–14.5)
RBC # FLD: 18.1 % — HIGH (ref 10.3–14.5)
RBC # FLD: 18.3 % — HIGH (ref 10.3–14.5)
RBC # FLD: 18.4 % — HIGH (ref 10.3–14.5)
RBC # FLD: 18.4 % — HIGH (ref 10.3–14.5)
RBC # FLD: 18.5 % — HIGH (ref 10.3–14.5)
RBC # FLD: 18.5 % — HIGH (ref 10.3–14.5)
RBC # FLD: 18.6 % — HIGH (ref 10.3–14.5)
RBC # FLD: 18.8 % — HIGH (ref 10.3–14.5)
RBC # FLD: 18.9 % — HIGH (ref 10.3–14.5)
RBC # FLD: 19 % — HIGH (ref 10.3–14.5)
RBC # FLD: 19.1 % — HIGH (ref 10.3–14.5)
RBC # FLD: 19.2 % — HIGH (ref 10.3–14.5)
RBC # FLD: 19.3 % — HIGH (ref 10.3–14.5)
RBC # FLD: 19.4 % — HIGH (ref 10.3–14.5)
RBC # FLD: 19.5 % — HIGH (ref 10.3–14.5)
RBC # FLD: 19.6 % — HIGH (ref 10.3–14.5)
RBC # FLD: 19.6 % — HIGH (ref 10.3–14.5)
RBC # FLD: 19.7 % — HIGH (ref 10.3–14.5)
RBC # FLD: 19.8 % — HIGH (ref 10.3–14.5)
RBC # FLD: 19.9 % — HIGH (ref 10.3–14.5)
RBC # FLD: 20 % — HIGH (ref 10.3–14.5)
RBC # FLD: 20 % — HIGH (ref 10.3–14.5)
RBC # FLD: 20.1 % — HIGH (ref 10.3–14.5)
RBC # FLD: 20.1 % — HIGH (ref 10.3–14.5)
RBC # FLD: 20.3 % — HIGH (ref 10.3–14.5)
RBC # FLD: 20.8 % — HIGH (ref 10.3–14.5)
RBC # FLD: 21.2 % — HIGH (ref 10.3–14.5)
RBC # FLD: 21.5 % — HIGH (ref 10.3–14.5)
RBC # FLD: 21.6 % — HIGH (ref 10.3–14.5)
RBC # FLD: 21.7 % — HIGH (ref 10.3–14.5)
RBC # FLD: 21.7 % — HIGH (ref 10.3–14.5)
RBC # FLD: 21.8 % — HIGH (ref 10.3–14.5)
RBC # FLD: 21.8 % — HIGH (ref 10.3–14.5)
RBC # FLD: 22.1 % — HIGH (ref 10.3–14.5)
RBC # FLD: 22.3 % — HIGH (ref 10.3–14.5)
RBC # FLD: 22.5 % — HIGH (ref 10.3–14.5)
RBC # FLD: 22.5 % — HIGH (ref 10.3–14.5)
RBC # FLD: 22.7 % — HIGH (ref 10.3–14.5)
RBC # FLD: 22.7 % — HIGH (ref 10.3–14.5)
RBC BLD AUTO: ABNORMAL
RBC BLD AUTO: SIGNIFICANT CHANGE UP
RBC CASTS # UR COMP ASSIST: 3 /HPF — SIGNIFICANT CHANGE UP (ref 0–4)
RBC CASTS # UR COMP ASSIST: 4 /HPF — SIGNIFICANT CHANGE UP (ref 0–4)
RBC CASTS # UR COMP ASSIST: >1900 /HPF — HIGH (ref 0–4)
RETICS #: 23.5 K/UL — LOW (ref 25–125)
RETICS/RBC NFR: 0.8 % — SIGNIFICANT CHANGE UP (ref 0.5–2.5)
REVIEW: SIGNIFICANT CHANGE UP
RH IG SCN BLD-IMP: NEGATIVE — SIGNIFICANT CHANGE UP
RSV RNA NPH QL NAA+NON-PROBE: SIGNIFICANT CHANGE UP
S AUREUS DNA NOSE QL NAA+PROBE: SIGNIFICANT CHANGE UP
SAO2 % BLDMV: 62.7 — SIGNIFICANT CHANGE UP (ref 60–90)
SAO2 % BLDMV: 66.6 — SIGNIFICANT CHANGE UP (ref 60–90)
SAO2 % BLDMV: 66.9 — SIGNIFICANT CHANGE UP (ref 60–90)
SAO2 % BLDMV: 71.3 — SIGNIFICANT CHANGE UP (ref 60–90)
SAO2 % BLDMV: 71.4 — SIGNIFICANT CHANGE UP (ref 60–90)
SAO2 % BLDMV: 71.5 — SIGNIFICANT CHANGE UP (ref 60–90)
SAO2 % BLDMV: 71.6 — SIGNIFICANT CHANGE UP (ref 60–90)
SAO2 % BLDMV: 71.6 — SIGNIFICANT CHANGE UP (ref 60–90)
SAO2 % BLDMV: 73.5 — SIGNIFICANT CHANGE UP (ref 60–90)
SAO2 % BLDMV: 73.6 — SIGNIFICANT CHANGE UP (ref 60–90)
SAO2 % BLDMV: 74.9 — SIGNIFICANT CHANGE UP (ref 60–90)
SAO2 % BLDMV: 75 — SIGNIFICANT CHANGE UP (ref 60–90)
SAO2 % BLDMV: 75.3 — SIGNIFICANT CHANGE UP (ref 60–90)
SAO2 % BLDMV: 75.6 — SIGNIFICANT CHANGE UP (ref 60–90)
SAO2 % BLDMV: 76.1 — SIGNIFICANT CHANGE UP (ref 60–90)
SAO2 % BLDMV: 76.1 — SIGNIFICANT CHANGE UP (ref 60–90)
SAO2 % BLDMV: 76.2 — SIGNIFICANT CHANGE UP (ref 60–90)
SAO2 % BLDMV: 76.4 — SIGNIFICANT CHANGE UP (ref 60–90)
SAO2 % BLDMV: 76.8 — SIGNIFICANT CHANGE UP (ref 60–90)
SAO2 % BLDMV: 78.8 — SIGNIFICANT CHANGE UP (ref 60–90)
SAO2 % BLDMV: 79 — SIGNIFICANT CHANGE UP (ref 60–90)
SAO2 % BLDMV: 79 — SIGNIFICANT CHANGE UP (ref 60–90)
SAO2 % BLDMV: 79.3 — SIGNIFICANT CHANGE UP (ref 60–90)
SAO2 % BLDMV: 80 — SIGNIFICANT CHANGE UP (ref 60–90)
SAO2 % BLDMV: 81.6 — SIGNIFICANT CHANGE UP (ref 60–90)
SAO2 % BLDMV: 82.1 — SIGNIFICANT CHANGE UP (ref 60–90)
SAO2 % BLDMV: 82.8 — SIGNIFICANT CHANGE UP (ref 60–90)
SAO2 % BLDMV: 83.6 — SIGNIFICANT CHANGE UP (ref 60–90)
SAO2 % BLDMV: 84.9 — SIGNIFICANT CHANGE UP (ref 60–90)
SAO2 % BLDMV: 85.4 — SIGNIFICANT CHANGE UP (ref 60–90)
SAO2 % BLDMV: 85.4 — SIGNIFICANT CHANGE UP (ref 60–90)
SAO2 % BLDMV: 85.6 — SIGNIFICANT CHANGE UP (ref 60–90)
SAO2 % BLDMV: 86.1 — SIGNIFICANT CHANGE UP (ref 60–90)
SAO2 % BLDMV: 86.6 — SIGNIFICANT CHANGE UP (ref 60–90)
SAO2 % BLDMV: 90 — SIGNIFICANT CHANGE UP (ref 60–90)
SAO2 % BLDMV: 90.2 — HIGH (ref 60–90)
SAO2 % BLDMV: 91.1 — HIGH (ref 60–90)
SAO2 % BLDMV: 91.3 — HIGH (ref 60–90)
SAO2 % BLDMV: 92.4 — HIGH (ref 60–90)
SAO2 % BLDMV: 95.3 — HIGH (ref 60–90)
SARS-COV-2 RNA SPEC QL NAA+PROBE: SIGNIFICANT CHANGE UP
SCHISTOCYTES BLD QL AUTO: SLIGHT — SIGNIFICANT CHANGE UP
SODIUM SERPL-SCNC: 130 MMOL/L — LOW (ref 135–145)
SODIUM SERPL-SCNC: 132 MMOL/L — LOW (ref 135–145)
SODIUM SERPL-SCNC: 133 MMOL/L — LOW (ref 135–145)
SODIUM SERPL-SCNC: 134 MMOL/L — LOW (ref 135–145)
SODIUM SERPL-SCNC: 135 MMOL/L — SIGNIFICANT CHANGE UP (ref 135–145)
SODIUM SERPL-SCNC: 136 MMOL/L — SIGNIFICANT CHANGE UP (ref 135–145)
SODIUM SERPL-SCNC: 137 MMOL/L — SIGNIFICANT CHANGE UP (ref 135–145)
SODIUM SERPL-SCNC: 138 MMOL/L — SIGNIFICANT CHANGE UP (ref 135–145)
SODIUM SERPL-SCNC: 139 MMOL/L — SIGNIFICANT CHANGE UP (ref 135–145)
SODIUM SERPL-SCNC: 140 MMOL/L — SIGNIFICANT CHANGE UP (ref 135–145)
SODIUM SERPL-SCNC: 141 MMOL/L — SIGNIFICANT CHANGE UP (ref 135–145)
SODIUM SERPL-SCNC: 142 MMOL/L — SIGNIFICANT CHANGE UP (ref 135–145)
SODIUM SERPL-SCNC: 143 MMOL/L — SIGNIFICANT CHANGE UP (ref 135–145)
SODIUM SERPL-SCNC: 144 MMOL/L — SIGNIFICANT CHANGE UP (ref 135–145)
SODIUM SERPL-SCNC: 145 MMOL/L — SIGNIFICANT CHANGE UP (ref 135–145)
SODIUM SERPL-SCNC: 146 MMOL/L — HIGH (ref 135–145)
SODIUM SERPL-SCNC: 146 MMOL/L — HIGH (ref 135–145)
SODIUM SERPL-SCNC: 147 MMOL/L — HIGH (ref 135–145)
SODIUM SERPL-SCNC: 148 MMOL/L — HIGH (ref 135–145)
SODIUM SERPL-SCNC: 149 MMOL/L — HIGH (ref 135–145)
SODIUM SERPL-SCNC: 149 MMOL/L — HIGH (ref 135–145)
SODIUM SERPL-SCNC: 150 MMOL/L — HIGH (ref 135–145)
SODIUM SERPL-SCNC: 151 MMOL/L — HIGH (ref 135–145)
SODIUM SERPL-SCNC: 153 MMOL/L — HIGH (ref 135–145)
SP GR SPEC: 1.01 — SIGNIFICANT CHANGE UP (ref 1–1.03)
SP GR SPEC: 1.01 — SIGNIFICANT CHANGE UP (ref 1–1.03)
SP GR SPEC: 1.02 — SIGNIFICANT CHANGE UP (ref 1–1.03)
SP GR SPEC: 1.02 — SIGNIFICANT CHANGE UP (ref 1–1.03)
SP GR SPEC: 1.03 — SIGNIFICANT CHANGE UP (ref 1–1.03)
SP GR SPEC: 1.03 — SIGNIFICANT CHANGE UP (ref 1–1.03)
SPECIMEN SOURCE: SIGNIFICANT CHANGE UP
SQUAMOUS # UR AUTO: 1 /HPF — SIGNIFICANT CHANGE UP (ref 0–5)
SQUAMOUS # UR AUTO: 5 /HPF — SIGNIFICANT CHANGE UP (ref 0–5)
SQUAMOUS # UR AUTO: 6 /HPF — HIGH (ref 0–5)
SURGICAL PATHOLOGY STUDY: SIGNIFICANT CHANGE UP
SURGICAL PATHOLOGY STUDY: SIGNIFICANT CHANGE UP
T GONDII IGG SER QL: 78 IU/ML — HIGH
T GONDII IGG SER QL: POSITIVE
T4 AB SER-ACNC: 3.1 UG/DL — LOW (ref 4.6–12)
T4 AB SER-ACNC: 3.7 UG/DL — LOW (ref 4.6–12)
TACROLIMUS SERPL-MCNC: 0.8 NG/ML — SIGNIFICANT CHANGE UP
TACROLIMUS SERPL-MCNC: 1 NG/ML — SIGNIFICANT CHANGE UP
TACROLIMUS SERPL-MCNC: 1.1 NG/ML — SIGNIFICANT CHANGE UP
TACROLIMUS SERPL-MCNC: 1.4 NG/ML — SIGNIFICANT CHANGE UP
TACROLIMUS SERPL-MCNC: <0.8 NG/ML — SIGNIFICANT CHANGE UP
TARGETS BLD QL SMEAR: SLIGHT — SIGNIFICANT CHANGE UP
TEG FUNCTIONAL FIBRINOGEN: 10.2 MM — LOW (ref 15–32)
TEG FUNCTIONAL FIBRINOGEN: 10.9 MM — LOW (ref 15–32)
TEG FUNCTIONAL FIBRINOGEN: 11.9 MM — LOW (ref 15–32)
TEG FUNCTIONAL FIBRINOGEN: 12.1 MM — LOW (ref 15–32)
TEG FUNCTIONAL FIBRINOGEN: 12.8 MM — LOW (ref 15–32)
TEG FUNCTIONAL FIBRINOGEN: 14.2 MM — LOW (ref 15–32)
TEG FUNCTIONAL FIBRINOGEN: 14.3 MM — LOW (ref 15–32)
TEG FUNCTIONAL FIBRINOGEN: 14.3 MM — LOW (ref 15–32)
TEG FUNCTIONAL FIBRINOGEN: 14.4 MM — LOW (ref 15–32)
TEG FUNCTIONAL FIBRINOGEN: 14.4 MM — LOW (ref 15–32)
TEG FUNCTIONAL FIBRINOGEN: 14.6 MM — LOW (ref 15–32)
TEG FUNCTIONAL FIBRINOGEN: 14.7 MM — LOW (ref 15–32)
TEG FUNCTIONAL FIBRINOGEN: 14.9 MM — LOW (ref 15–32)
TEG FUNCTIONAL FIBRINOGEN: 15.2 MM — SIGNIFICANT CHANGE UP (ref 15–32)
TEG FUNCTIONAL FIBRINOGEN: 15.4 MM — SIGNIFICANT CHANGE UP (ref 15–32)
TEG FUNCTIONAL FIBRINOGEN: 15.4 MM — SIGNIFICANT CHANGE UP (ref 15–32)
TEG FUNCTIONAL FIBRINOGEN: 15.7 MM — SIGNIFICANT CHANGE UP (ref 15–32)
TEG FUNCTIONAL FIBRINOGEN: 15.8 MM — SIGNIFICANT CHANGE UP (ref 15–32)
TEG FUNCTIONAL FIBRINOGEN: 15.8 MM — SIGNIFICANT CHANGE UP (ref 15–32)
TEG FUNCTIONAL FIBRINOGEN: 16 MM — SIGNIFICANT CHANGE UP (ref 15–32)
TEG FUNCTIONAL FIBRINOGEN: 16.1 MM — SIGNIFICANT CHANGE UP (ref 15–32)
TEG FUNCTIONAL FIBRINOGEN: 16.1 MM — SIGNIFICANT CHANGE UP (ref 15–32)
TEG FUNCTIONAL FIBRINOGEN: 16.4 MM — SIGNIFICANT CHANGE UP (ref 15–32)
TEG FUNCTIONAL FIBRINOGEN: 16.4 MM — SIGNIFICANT CHANGE UP (ref 15–32)
TEG FUNCTIONAL FIBRINOGEN: 17.5 MM — SIGNIFICANT CHANGE UP (ref 15–32)
TEG FUNCTIONAL FIBRINOGEN: 17.7 MM — SIGNIFICANT CHANGE UP (ref 15–32)
TEG FUNCTIONAL FIBRINOGEN: 17.7 MM — SIGNIFICANT CHANGE UP (ref 15–32)
TEG FUNCTIONAL FIBRINOGEN: 6.7 MM — LOW (ref 15–32)
TEG FUNCTIONAL FIBRINOGEN: 8.1 MM — LOW (ref 15–32)
TEG FUNCTIONAL FIBRINOGEN: 9.6 MM — LOW (ref 15–32)
TEG FUNCTIONAL FIBRINOGEN: 9.6 MM — LOW (ref 15–32)
TEG FUNCTIONAL FIBRINOGEN: <4 MM — LOW (ref 15–32)
TEG LY30 (LYSIS): 0 % — SIGNIFICANT CHANGE UP (ref 0–2.6)
TEG MAXIMUM AMPLITUDE: 40.5 MM — LOW (ref 52–69)
TEG MAXIMUM AMPLITUDE: 48.1 MM — LOW (ref 52–69)
TEG MAXIMUM AMPLITUDE: 50 MM — LOW (ref 52–69)
TEG MAXIMUM AMPLITUDE: 50.2 MM — LOW (ref 52–69)
TEG MAXIMUM AMPLITUDE: 52.1 MM — SIGNIFICANT CHANGE UP (ref 52–69)
TEG MAXIMUM AMPLITUDE: 57.2 MM — SIGNIFICANT CHANGE UP (ref 52–69)
TEG MAXIMUM AMPLITUDE: <40 MM — LOW (ref 52–69)
TEG REACTION TIME: 11.1 MIN — HIGH (ref 4.6–9.1)
TEG REACTION TIME: 11.8 MIN — HIGH (ref 4.6–9.1)
TEG REACTION TIME: 13.4 MIN — HIGH (ref 4.6–9.1)
TEG REACTION TIME: 14.7 MIN — HIGH (ref 4.6–9.1)
TEG REACTION TIME: 4.2 MIN — LOW (ref 4.6–9.1)
TEG REACTION TIME: 5.2 MIN — SIGNIFICANT CHANGE UP (ref 4.6–9.1)
TEG REACTION TIME: 5.6 MIN — SIGNIFICANT CHANGE UP (ref 4.6–9.1)
TEG REACTION TIME: 5.9 MIN — SIGNIFICANT CHANGE UP (ref 4.6–9.1)
TEG REACTION TIME: 6 MIN — SIGNIFICANT CHANGE UP (ref 4.6–9.1)
TEG REACTION TIME: 6.2 MIN — SIGNIFICANT CHANGE UP (ref 4.6–9.1)
TEG REACTION TIME: 6.4 MIN — SIGNIFICANT CHANGE UP (ref 4.6–9.1)
TEG REACTION TIME: 6.5 MIN — SIGNIFICANT CHANGE UP (ref 4.6–9.1)
TEG REACTION TIME: 6.6 MIN — SIGNIFICANT CHANGE UP (ref 4.6–9.1)
TEG REACTION TIME: 6.7 MIN — SIGNIFICANT CHANGE UP (ref 4.6–9.1)
TEG REACTION TIME: 7.2 MIN — SIGNIFICANT CHANGE UP (ref 4.6–9.1)
TEG REACTION TIME: 7.5 MIN — SIGNIFICANT CHANGE UP (ref 4.6–9.1)
TEG REACTION TIME: 7.5 MIN — SIGNIFICANT CHANGE UP (ref 4.6–9.1)
TEG REACTION TIME: 7.7 MIN — SIGNIFICANT CHANGE UP (ref 4.6–9.1)
TEG REACTION TIME: 7.7 MIN — SIGNIFICANT CHANGE UP (ref 4.6–9.1)
TEG REACTION TIME: 8 MIN — SIGNIFICANT CHANGE UP (ref 4.6–9.1)
TEG REACTION TIME: 8.2 MIN — SIGNIFICANT CHANGE UP (ref 4.6–9.1)
TEG REACTION TIME: 8.7 MIN — SIGNIFICANT CHANGE UP (ref 4.6–9.1)
TEG REACTION TIME: 8.8 MIN — SIGNIFICANT CHANGE UP (ref 4.6–9.1)
TEG REACTION TIME: 8.9 MIN — SIGNIFICANT CHANGE UP (ref 4.6–9.1)
TEG REACTION TIME: 9.1 MIN — SIGNIFICANT CHANGE UP (ref 4.6–9.1)
TEG REACTION TIME: 9.3 MIN — HIGH (ref 4.6–9.1)
TEG REACTION TIME: >17 MIN — HIGH (ref 4.6–9.1)
THROMBIN TIME: 116.5 SEC — HIGH (ref 16–25)
THROMBIN TIME: 84.3 SEC — HIGH (ref 16–25)
TOXIC GRANULES BLD QL SMEAR: PRESENT — SIGNIFICANT CHANGE UP
TROPONIN T, HIGH SENSITIVITY RESULT: 10 NG/L — SIGNIFICANT CHANGE UP (ref 0–51)
TROPONIN T, HIGH SENSITIVITY RESULT: 10 NG/L — SIGNIFICANT CHANGE UP (ref 0–51)
TROPONIN T, HIGH SENSITIVITY RESULT: 11 NG/L — SIGNIFICANT CHANGE UP (ref 0–51)
TROPONIN T, HIGH SENSITIVITY RESULT: 1213 NG/L — HIGH (ref 0–51)
TROPONIN T, HIGH SENSITIVITY RESULT: 1464 NG/L — HIGH (ref 0–51)
TROPONIN T, HIGH SENSITIVITY RESULT: 1808 NG/L — HIGH (ref 0–51)
TROPONIN T, HIGH SENSITIVITY RESULT: 1828 NG/L — HIGH (ref 0–51)
TROPONIN T, HIGH SENSITIVITY RESULT: 1849 NG/L — HIGH (ref 0–51)
TROPONIN T, HIGH SENSITIVITY RESULT: 1904 NG/L — HIGH (ref 0–51)
TROPONIN T, HIGH SENSITIVITY RESULT: 1909 NG/L — HIGH (ref 0–51)
TROPONIN T, HIGH SENSITIVITY RESULT: 194 NG/L — HIGH (ref 0–51)
TROPONIN T, HIGH SENSITIVITY RESULT: 195 NG/L — HIGH (ref 0–51)
TROPONIN T, HIGH SENSITIVITY RESULT: 200 NG/L — HIGH (ref 0–51)
TROPONIN T, HIGH SENSITIVITY RESULT: 212 NG/L — HIGH (ref 0–51)
TROPONIN T, HIGH SENSITIVITY RESULT: 213 NG/L — HIGH (ref 0–51)
TROPONIN T, HIGH SENSITIVITY RESULT: 220 NG/L — HIGH (ref 0–51)
TROPONIN T, HIGH SENSITIVITY RESULT: 228 NG/L — HIGH (ref 0–51)
TROPONIN T, HIGH SENSITIVITY RESULT: 233 NG/L — HIGH (ref 0–51)
TROPONIN T, HIGH SENSITIVITY RESULT: 244 NG/L — HIGH (ref 0–51)
TROPONIN T, HIGH SENSITIVITY RESULT: 256 NG/L — HIGH (ref 0–51)
TROPONIN T, HIGH SENSITIVITY RESULT: 27 NG/L — SIGNIFICANT CHANGE UP (ref 0–51)
TROPONIN T, HIGH SENSITIVITY RESULT: 274 NG/L — HIGH (ref 0–51)
TROPONIN T, HIGH SENSITIVITY RESULT: 321 NG/L — HIGH (ref 0–51)
TROPONIN T, HIGH SENSITIVITY RESULT: 33 NG/L — SIGNIFICANT CHANGE UP (ref 0–51)
TROPONIN T, HIGH SENSITIVITY RESULT: 35 NG/L — SIGNIFICANT CHANGE UP (ref 0–51)
TROPONIN T, HIGH SENSITIVITY RESULT: 406 NG/L — HIGH (ref 0–51)
TROPONIN T, HIGH SENSITIVITY RESULT: 443 NG/L — HIGH (ref 0–51)
TROPONIN T, HIGH SENSITIVITY RESULT: 454 NG/L — HIGH (ref 0–51)
TROPONIN T, HIGH SENSITIVITY RESULT: 478 NG/L — HIGH (ref 0–51)
TROPONIN T, HIGH SENSITIVITY RESULT: 51 NG/L — SIGNIFICANT CHANGE UP (ref 0–51)
TROPONIN T, HIGH SENSITIVITY RESULT: 604 NG/L — HIGH (ref 0–51)
TROPONIN T, HIGH SENSITIVITY RESULT: 653 NG/L — HIGH (ref 0–51)
TROPONIN T, HIGH SENSITIVITY RESULT: 721 NG/L — HIGH (ref 0–51)
TROPONIN T, HIGH SENSITIVITY RESULT: 728 NG/L — HIGH (ref 0–51)
TROPONIN T, HIGH SENSITIVITY RESULT: 851 NG/L — HIGH (ref 0–51)
TROPONIN T, HIGH SENSITIVITY RESULT: 975 NG/L — HIGH (ref 0–51)
TSH SERPL-MCNC: 0.68 UIU/ML — SIGNIFICANT CHANGE UP (ref 0.27–4.2)
TSH SERPL-MCNC: 1.59 UIU/ML — SIGNIFICANT CHANGE UP (ref 0.27–4.2)
UNFRACTIONATED HEPARIN INTERPRETATION: SIGNIFICANT CHANGE UP
UNFRACTIONATED HEPARIN INTERPRETATION: SIGNIFICANT CHANGE UP
UNFRACTIONATED HEPARIN RESULT: NEGATIVE — SIGNIFICANT CHANGE UP
UNFRACTIONATED HEPARIN RESULT: NEGATIVE — SIGNIFICANT CHANGE UP
UNFRACTIONATED HEPARIN-HIGH DOSE: 0 % — SIGNIFICANT CHANGE UP
UNFRACTIONATED HEPARIN-HIGH DOSE: 0 % — SIGNIFICANT CHANGE UP
UNFRACTIONATED HEPARIN-LOW DOSE: 0 % — SIGNIFICANT CHANGE UP
UNFRACTIONATED HEPARIN-LOW DOSE: 0 % — SIGNIFICANT CHANGE UP
UROBILINOGEN FLD QL: 0.2 MG/DL — SIGNIFICANT CHANGE UP (ref 0.2–1)
UROBILINOGEN FLD QL: 1 MG/DL — SIGNIFICANT CHANGE UP (ref 0.2–1)
UROBILINOGEN FLD QL: 1 MG/DL — SIGNIFICANT CHANGE UP (ref 0.2–1)
VANCOMYCIN FLD-MCNC: 11.4 UG/ML — SIGNIFICANT CHANGE UP
VANCOMYCIN FLD-MCNC: 15 UG/ML — SIGNIFICANT CHANGE UP
VANCOMYCIN FLD-MCNC: 16.3 UG/ML — SIGNIFICANT CHANGE UP
VANCOMYCIN FLD-MCNC: 16.6 UG/ML — SIGNIFICANT CHANGE UP
VANCOMYCIN FLD-MCNC: 4.4 UG/ML — SIGNIFICANT CHANGE UP
VANCOMYCIN FLD-MCNC: 8.5 UG/ML — SIGNIFICANT CHANGE UP
VANCOMYCIN FLD-MCNC: 9 UG/ML — SIGNIFICANT CHANGE UP
VANCOMYCIN FLD-MCNC: 9.3 UG/ML — SIGNIFICANT CHANGE UP
VANCOMYCIN FLD-MCNC: <4 UG/ML — SIGNIFICANT CHANGE UP
VARIANT LYMPHS # BLD: 1.7 % — SIGNIFICANT CHANGE UP (ref 0–6)
VARIANT LYMPHS NFR BLD MANUAL: 1.7 % — SIGNIFICANT CHANGE UP (ref 0–6)
VIT B1 SERPL-MCNC: 106.5 NMOL/L — SIGNIFICANT CHANGE UP (ref 66.5–200)
VIT B12 SERPL-MCNC: 1252 PG/ML — HIGH (ref 232–1245)
VZV IGG FLD QL IA: 28.9 S/CO — SIGNIFICANT CHANGE UP
VZV IGG FLD QL IA: POSITIVE — SIGNIFICANT CHANGE UP
WBC # BLD: 0.84 K/UL — CRITICAL LOW (ref 3.8–10.5)
WBC # BLD: 0.89 K/UL — CRITICAL LOW (ref 3.8–10.5)
WBC # BLD: 0.91 K/UL — CRITICAL LOW (ref 3.8–10.5)
WBC # BLD: 1.23 K/UL — LOW (ref 3.8–10.5)
WBC # BLD: 1.38 K/UL — LOW (ref 3.8–10.5)
WBC # BLD: 1.39 K/UL — LOW (ref 3.8–10.5)
WBC # BLD: 1.68 K/UL — LOW (ref 3.8–10.5)
WBC # BLD: 1.8 K/UL — LOW (ref 3.8–10.5)
WBC # BLD: 1.96 K/UL — LOW (ref 3.8–10.5)
WBC # BLD: 10.01 K/UL — SIGNIFICANT CHANGE UP (ref 3.8–10.5)
WBC # BLD: 10.1 K/UL — SIGNIFICANT CHANGE UP (ref 3.8–10.5)
WBC # BLD: 10.14 K/UL — SIGNIFICANT CHANGE UP (ref 3.8–10.5)
WBC # BLD: 10.37 K/UL — SIGNIFICANT CHANGE UP (ref 3.8–10.5)
WBC # BLD: 10.93 K/UL — HIGH (ref 3.8–10.5)
WBC # BLD: 11.03 K/UL — HIGH (ref 3.8–10.5)
WBC # BLD: 11.11 K/UL — HIGH (ref 3.8–10.5)
WBC # BLD: 11.13 K/UL — HIGH (ref 3.8–10.5)
WBC # BLD: 11.19 K/UL — HIGH (ref 3.8–10.5)
WBC # BLD: 11.3 K/UL — HIGH (ref 3.8–10.5)
WBC # BLD: 11.35 K/UL — HIGH (ref 3.8–10.5)
WBC # BLD: 11.36 K/UL — HIGH (ref 3.8–10.5)
WBC # BLD: 11.41 K/UL — HIGH (ref 3.8–10.5)
WBC # BLD: 11.5 K/UL — HIGH (ref 3.8–10.5)
WBC # BLD: 11.52 K/UL — HIGH (ref 3.8–10.5)
WBC # BLD: 11.53 K/UL — HIGH (ref 3.8–10.5)
WBC # BLD: 11.59 K/UL — HIGH (ref 3.8–10.5)
WBC # BLD: 11.68 K/UL — HIGH (ref 3.8–10.5)
WBC # BLD: 11.72 K/UL — HIGH (ref 3.8–10.5)
WBC # BLD: 11.76 K/UL — HIGH (ref 3.8–10.5)
WBC # BLD: 11.87 K/UL — HIGH (ref 3.8–10.5)
WBC # BLD: 11.91 K/UL — HIGH (ref 3.8–10.5)
WBC # BLD: 12.13 K/UL — HIGH (ref 3.8–10.5)
WBC # BLD: 12.32 K/UL — HIGH (ref 3.8–10.5)
WBC # BLD: 12.35 K/UL — HIGH (ref 3.8–10.5)
WBC # BLD: 12.93 K/UL — HIGH (ref 3.8–10.5)
WBC # BLD: 12.95 K/UL — HIGH (ref 3.8–10.5)
WBC # BLD: 13.31 K/UL — HIGH (ref 3.8–10.5)
WBC # BLD: 13.51 K/UL — HIGH (ref 3.8–10.5)
WBC # BLD: 13.63 K/UL — HIGH (ref 3.8–10.5)
WBC # BLD: 13.66 K/UL — HIGH (ref 3.8–10.5)
WBC # BLD: 13.76 K/UL — HIGH (ref 3.8–10.5)
WBC # BLD: 13.89 K/UL — HIGH (ref 3.8–10.5)
WBC # BLD: 13.93 K/UL — HIGH (ref 3.8–10.5)
WBC # BLD: 14.1 K/UL — HIGH (ref 3.8–10.5)
WBC # BLD: 14.29 K/UL — HIGH (ref 3.8–10.5)
WBC # BLD: 14.41 K/UL — HIGH (ref 3.8–10.5)
WBC # BLD: 14.58 K/UL — HIGH (ref 3.8–10.5)
WBC # BLD: 14.7 K/UL — HIGH (ref 3.8–10.5)
WBC # BLD: 14.7 K/UL — HIGH (ref 3.8–10.5)
WBC # BLD: 14.96 K/UL — HIGH (ref 3.8–10.5)
WBC # BLD: 14.97 K/UL — HIGH (ref 3.8–10.5)
WBC # BLD: 15.02 K/UL — HIGH (ref 3.8–10.5)
WBC # BLD: 15.09 K/UL — HIGH (ref 3.8–10.5)
WBC # BLD: 15.24 K/UL — HIGH (ref 3.8–10.5)
WBC # BLD: 15.25 K/UL — HIGH (ref 3.8–10.5)
WBC # BLD: 15.46 K/UL — HIGH (ref 3.8–10.5)
WBC # BLD: 15.56 K/UL — HIGH (ref 3.8–10.5)
WBC # BLD: 15.74 K/UL — HIGH (ref 3.8–10.5)
WBC # BLD: 15.78 K/UL — HIGH (ref 3.8–10.5)
WBC # BLD: 15.9 K/UL — HIGH (ref 3.8–10.5)
WBC # BLD: 16.13 K/UL — HIGH (ref 3.8–10.5)
WBC # BLD: 16.19 K/UL — HIGH (ref 3.8–10.5)
WBC # BLD: 16.6 K/UL — HIGH (ref 3.8–10.5)
WBC # BLD: 17.1 K/UL — HIGH (ref 3.8–10.5)
WBC # BLD: 17.25 K/UL — HIGH (ref 3.8–10.5)
WBC # BLD: 17.28 K/UL — HIGH (ref 3.8–10.5)
WBC # BLD: 17.35 K/UL — HIGH (ref 3.8–10.5)
WBC # BLD: 17.39 K/UL — HIGH (ref 3.8–10.5)
WBC # BLD: 17.58 K/UL — HIGH (ref 3.8–10.5)
WBC # BLD: 18.62 K/UL — HIGH (ref 3.8–10.5)
WBC # BLD: 18.73 K/UL — HIGH (ref 3.8–10.5)
WBC # BLD: 19.38 K/UL — HIGH (ref 3.8–10.5)
WBC # BLD: 19.48 K/UL — HIGH (ref 3.8–10.5)
WBC # BLD: 19.53 K/UL — HIGH (ref 3.8–10.5)
WBC # BLD: 19.95 K/UL — HIGH (ref 3.8–10.5)
WBC # BLD: 2 K/UL — LOW (ref 3.8–10.5)
WBC # BLD: 2.11 K/UL — LOW (ref 3.8–10.5)
WBC # BLD: 2.16 K/UL — LOW (ref 3.8–10.5)
WBC # BLD: 2.48 K/UL — LOW (ref 3.8–10.5)
WBC # BLD: 20.36 K/UL — HIGH (ref 3.8–10.5)
WBC # BLD: 20.66 K/UL — HIGH (ref 3.8–10.5)
WBC # BLD: 22.27 K/UL — HIGH (ref 3.8–10.5)
WBC # BLD: 3.81 K/UL — SIGNIFICANT CHANGE UP (ref 3.8–10.5)
WBC # BLD: 4.03 K/UL — SIGNIFICANT CHANGE UP (ref 3.8–10.5)
WBC # BLD: 4.06 K/UL — SIGNIFICANT CHANGE UP (ref 3.8–10.5)
WBC # BLD: 4.6 K/UL — SIGNIFICANT CHANGE UP (ref 3.8–10.5)
WBC # BLD: 4.83 K/UL — SIGNIFICANT CHANGE UP (ref 3.8–10.5)
WBC # BLD: 5.03 K/UL — SIGNIFICANT CHANGE UP (ref 3.8–10.5)
WBC # BLD: 5.08 K/UL — SIGNIFICANT CHANGE UP (ref 3.8–10.5)
WBC # BLD: 5.16 K/UL — SIGNIFICANT CHANGE UP (ref 3.8–10.5)
WBC # BLD: 5.28 K/UL — SIGNIFICANT CHANGE UP (ref 3.8–10.5)
WBC # BLD: 5.43 K/UL — SIGNIFICANT CHANGE UP (ref 3.8–10.5)
WBC # BLD: 5.75 K/UL — SIGNIFICANT CHANGE UP (ref 3.8–10.5)
WBC # BLD: 5.78 K/UL — SIGNIFICANT CHANGE UP (ref 3.8–10.5)
WBC # BLD: 6.08 K/UL — SIGNIFICANT CHANGE UP (ref 3.8–10.5)
WBC # BLD: 6.23 K/UL — SIGNIFICANT CHANGE UP (ref 3.8–10.5)
WBC # BLD: 6.38 K/UL — SIGNIFICANT CHANGE UP (ref 3.8–10.5)
WBC # BLD: 6.42 K/UL — SIGNIFICANT CHANGE UP (ref 3.8–10.5)
WBC # BLD: 6.43 K/UL — SIGNIFICANT CHANGE UP (ref 3.8–10.5)
WBC # BLD: 7.25 K/UL — SIGNIFICANT CHANGE UP (ref 3.8–10.5)
WBC # BLD: 7.42 K/UL — SIGNIFICANT CHANGE UP (ref 3.8–10.5)
WBC # BLD: 7.43 K/UL — SIGNIFICANT CHANGE UP (ref 3.8–10.5)
WBC # BLD: 7.43 K/UL — SIGNIFICANT CHANGE UP (ref 3.8–10.5)
WBC # BLD: 7.45 K/UL — SIGNIFICANT CHANGE UP (ref 3.8–10.5)
WBC # BLD: 7.47 K/UL — SIGNIFICANT CHANGE UP (ref 3.8–10.5)
WBC # BLD: 7.49 K/UL — SIGNIFICANT CHANGE UP (ref 3.8–10.5)
WBC # BLD: 7.53 K/UL — SIGNIFICANT CHANGE UP (ref 3.8–10.5)
WBC # BLD: 7.54 K/UL — SIGNIFICANT CHANGE UP (ref 3.8–10.5)
WBC # BLD: 7.69 K/UL — SIGNIFICANT CHANGE UP (ref 3.8–10.5)
WBC # BLD: 7.7 K/UL — SIGNIFICANT CHANGE UP (ref 3.8–10.5)
WBC # BLD: 7.71 K/UL — SIGNIFICANT CHANGE UP (ref 3.8–10.5)
WBC # BLD: 7.78 K/UL — SIGNIFICANT CHANGE UP (ref 3.8–10.5)
WBC # BLD: 7.8 K/UL — SIGNIFICANT CHANGE UP (ref 3.8–10.5)
WBC # BLD: 7.83 K/UL — SIGNIFICANT CHANGE UP (ref 3.8–10.5)
WBC # BLD: 7.94 K/UL — SIGNIFICANT CHANGE UP (ref 3.8–10.5)
WBC # BLD: 8.03 K/UL — SIGNIFICANT CHANGE UP (ref 3.8–10.5)
WBC # BLD: 8.08 K/UL — SIGNIFICANT CHANGE UP (ref 3.8–10.5)
WBC # BLD: 8.12 K/UL — SIGNIFICANT CHANGE UP (ref 3.8–10.5)
WBC # BLD: 8.13 K/UL — SIGNIFICANT CHANGE UP (ref 3.8–10.5)
WBC # BLD: 8.16 K/UL — SIGNIFICANT CHANGE UP (ref 3.8–10.5)
WBC # BLD: 8.55 K/UL — SIGNIFICANT CHANGE UP (ref 3.8–10.5)
WBC # BLD: 8.7 K/UL — SIGNIFICANT CHANGE UP (ref 3.8–10.5)
WBC # BLD: 8.72 K/UL — SIGNIFICANT CHANGE UP (ref 3.8–10.5)
WBC # BLD: 8.73 K/UL — SIGNIFICANT CHANGE UP (ref 3.8–10.5)
WBC # BLD: 8.81 K/UL — SIGNIFICANT CHANGE UP (ref 3.8–10.5)
WBC # BLD: 8.89 K/UL — SIGNIFICANT CHANGE UP (ref 3.8–10.5)
WBC # BLD: 9 K/UL — SIGNIFICANT CHANGE UP (ref 3.8–10.5)
WBC # BLD: 9.09 K/UL — SIGNIFICANT CHANGE UP (ref 3.8–10.5)
WBC # BLD: 9.17 K/UL — SIGNIFICANT CHANGE UP (ref 3.8–10.5)
WBC # BLD: 9.46 K/UL — SIGNIFICANT CHANGE UP (ref 3.8–10.5)
WBC # BLD: 9.56 K/UL — SIGNIFICANT CHANGE UP (ref 3.8–10.5)
WBC # BLD: 9.62 K/UL — SIGNIFICANT CHANGE UP (ref 3.8–10.5)
WBC # BLD: 9.64 K/UL — SIGNIFICANT CHANGE UP (ref 3.8–10.5)
WBC # BLD: 9.65 K/UL — SIGNIFICANT CHANGE UP (ref 3.8–10.5)
WBC # BLD: 9.86 K/UL — SIGNIFICANT CHANGE UP (ref 3.8–10.5)
WBC # BLD: 9.99 K/UL — SIGNIFICANT CHANGE UP (ref 3.8–10.5)
WBC # FLD AUTO: 0.84 K/UL — CRITICAL LOW (ref 3.8–10.5)
WBC # FLD AUTO: 0.89 K/UL — CRITICAL LOW (ref 3.8–10.5)
WBC # FLD AUTO: 0.91 K/UL — CRITICAL LOW (ref 3.8–10.5)
WBC # FLD AUTO: 1.23 K/UL — LOW (ref 3.8–10.5)
WBC # FLD AUTO: 1.38 K/UL — LOW (ref 3.8–10.5)
WBC # FLD AUTO: 1.39 K/UL — LOW (ref 3.8–10.5)
WBC # FLD AUTO: 1.68 K/UL — LOW (ref 3.8–10.5)
WBC # FLD AUTO: 1.8 K/UL — LOW (ref 3.8–10.5)
WBC # FLD AUTO: 1.96 K/UL — LOW (ref 3.8–10.5)
WBC # FLD AUTO: 10.01 K/UL — SIGNIFICANT CHANGE UP (ref 3.8–10.5)
WBC # FLD AUTO: 10.1 K/UL — SIGNIFICANT CHANGE UP (ref 3.8–10.5)
WBC # FLD AUTO: 10.14 K/UL — SIGNIFICANT CHANGE UP (ref 3.8–10.5)
WBC # FLD AUTO: 10.37 K/UL — SIGNIFICANT CHANGE UP (ref 3.8–10.5)
WBC # FLD AUTO: 10.93 K/UL — HIGH (ref 3.8–10.5)
WBC # FLD AUTO: 11.03 K/UL — HIGH (ref 3.8–10.5)
WBC # FLD AUTO: 11.11 K/UL — HIGH (ref 3.8–10.5)
WBC # FLD AUTO: 11.13 K/UL — HIGH (ref 3.8–10.5)
WBC # FLD AUTO: 11.19 K/UL — HIGH (ref 3.8–10.5)
WBC # FLD AUTO: 11.3 K/UL — HIGH (ref 3.8–10.5)
WBC # FLD AUTO: 11.35 K/UL — HIGH (ref 3.8–10.5)
WBC # FLD AUTO: 11.36 K/UL — HIGH (ref 3.8–10.5)
WBC # FLD AUTO: 11.41 K/UL — HIGH (ref 3.8–10.5)
WBC # FLD AUTO: 11.5 K/UL — HIGH (ref 3.8–10.5)
WBC # FLD AUTO: 11.52 K/UL — HIGH (ref 3.8–10.5)
WBC # FLD AUTO: 11.53 K/UL — HIGH (ref 3.8–10.5)
WBC # FLD AUTO: 11.59 K/UL — HIGH (ref 3.8–10.5)
WBC # FLD AUTO: 11.68 K/UL — HIGH (ref 3.8–10.5)
WBC # FLD AUTO: 11.72 K/UL — HIGH (ref 3.8–10.5)
WBC # FLD AUTO: 11.76 K/UL — HIGH (ref 3.8–10.5)
WBC # FLD AUTO: 11.87 K/UL — HIGH (ref 3.8–10.5)
WBC # FLD AUTO: 11.91 K/UL — HIGH (ref 3.8–10.5)
WBC # FLD AUTO: 12.13 K/UL — HIGH (ref 3.8–10.5)
WBC # FLD AUTO: 12.32 K/UL — HIGH (ref 3.8–10.5)
WBC # FLD AUTO: 12.35 K/UL — HIGH (ref 3.8–10.5)
WBC # FLD AUTO: 12.93 K/UL — HIGH (ref 3.8–10.5)
WBC # FLD AUTO: 12.95 K/UL — HIGH (ref 3.8–10.5)
WBC # FLD AUTO: 13.31 K/UL — HIGH (ref 3.8–10.5)
WBC # FLD AUTO: 13.51 K/UL — HIGH (ref 3.8–10.5)
WBC # FLD AUTO: 13.63 K/UL — HIGH (ref 3.8–10.5)
WBC # FLD AUTO: 13.66 K/UL — HIGH (ref 3.8–10.5)
WBC # FLD AUTO: 13.76 K/UL — HIGH (ref 3.8–10.5)
WBC # FLD AUTO: 13.89 K/UL — HIGH (ref 3.8–10.5)
WBC # FLD AUTO: 13.93 K/UL — HIGH (ref 3.8–10.5)
WBC # FLD AUTO: 14.1 K/UL — HIGH (ref 3.8–10.5)
WBC # FLD AUTO: 14.29 K/UL — HIGH (ref 3.8–10.5)
WBC # FLD AUTO: 14.41 K/UL — HIGH (ref 3.8–10.5)
WBC # FLD AUTO: 14.58 K/UL — HIGH (ref 3.8–10.5)
WBC # FLD AUTO: 14.7 K/UL — HIGH (ref 3.8–10.5)
WBC # FLD AUTO: 14.7 K/UL — HIGH (ref 3.8–10.5)
WBC # FLD AUTO: 14.96 K/UL — HIGH (ref 3.8–10.5)
WBC # FLD AUTO: 14.97 K/UL — HIGH (ref 3.8–10.5)
WBC # FLD AUTO: 15.02 K/UL — HIGH (ref 3.8–10.5)
WBC # FLD AUTO: 15.09 K/UL — HIGH (ref 3.8–10.5)
WBC # FLD AUTO: 15.24 K/UL — HIGH (ref 3.8–10.5)
WBC # FLD AUTO: 15.25 K/UL — HIGH (ref 3.8–10.5)
WBC # FLD AUTO: 15.46 K/UL — HIGH (ref 3.8–10.5)
WBC # FLD AUTO: 15.56 K/UL — HIGH (ref 3.8–10.5)
WBC # FLD AUTO: 15.74 K/UL — HIGH (ref 3.8–10.5)
WBC # FLD AUTO: 15.78 K/UL — HIGH (ref 3.8–10.5)
WBC # FLD AUTO: 15.9 K/UL — HIGH (ref 3.8–10.5)
WBC # FLD AUTO: 16.13 K/UL — HIGH (ref 3.8–10.5)
WBC # FLD AUTO: 16.19 K/UL — HIGH (ref 3.8–10.5)
WBC # FLD AUTO: 16.6 K/UL — HIGH (ref 3.8–10.5)
WBC # FLD AUTO: 17.1 K/UL — HIGH (ref 3.8–10.5)
WBC # FLD AUTO: 17.25 K/UL — HIGH (ref 3.8–10.5)
WBC # FLD AUTO: 17.28 K/UL — HIGH (ref 3.8–10.5)
WBC # FLD AUTO: 17.35 K/UL — HIGH (ref 3.8–10.5)
WBC # FLD AUTO: 17.39 K/UL — HIGH (ref 3.8–10.5)
WBC # FLD AUTO: 17.58 K/UL — HIGH (ref 3.8–10.5)
WBC # FLD AUTO: 18.62 K/UL — HIGH (ref 3.8–10.5)
WBC # FLD AUTO: 18.73 K/UL — HIGH (ref 3.8–10.5)
WBC # FLD AUTO: 19.38 K/UL — HIGH (ref 3.8–10.5)
WBC # FLD AUTO: 19.48 K/UL — HIGH (ref 3.8–10.5)
WBC # FLD AUTO: 19.53 K/UL — HIGH (ref 3.8–10.5)
WBC # FLD AUTO: 19.95 K/UL — HIGH (ref 3.8–10.5)
WBC # FLD AUTO: 2 K/UL — LOW (ref 3.8–10.5)
WBC # FLD AUTO: 2.11 K/UL — LOW (ref 3.8–10.5)
WBC # FLD AUTO: 2.16 K/UL — LOW (ref 3.8–10.5)
WBC # FLD AUTO: 2.48 K/UL — LOW (ref 3.8–10.5)
WBC # FLD AUTO: 20.36 K/UL — HIGH (ref 3.8–10.5)
WBC # FLD AUTO: 20.66 K/UL — HIGH (ref 3.8–10.5)
WBC # FLD AUTO: 22.27 K/UL — HIGH (ref 3.8–10.5)
WBC # FLD AUTO: 3.81 K/UL — SIGNIFICANT CHANGE UP (ref 3.8–10.5)
WBC # FLD AUTO: 4.03 K/UL — SIGNIFICANT CHANGE UP (ref 3.8–10.5)
WBC # FLD AUTO: 4.06 K/UL — SIGNIFICANT CHANGE UP (ref 3.8–10.5)
WBC # FLD AUTO: 4.6 K/UL — SIGNIFICANT CHANGE UP (ref 3.8–10.5)
WBC # FLD AUTO: 4.83 K/UL — SIGNIFICANT CHANGE UP (ref 3.8–10.5)
WBC # FLD AUTO: 5.03 K/UL — SIGNIFICANT CHANGE UP (ref 3.8–10.5)
WBC # FLD AUTO: 5.08 K/UL — SIGNIFICANT CHANGE UP (ref 3.8–10.5)
WBC # FLD AUTO: 5.16 K/UL — SIGNIFICANT CHANGE UP (ref 3.8–10.5)
WBC # FLD AUTO: 5.28 K/UL — SIGNIFICANT CHANGE UP (ref 3.8–10.5)
WBC # FLD AUTO: 5.43 K/UL — SIGNIFICANT CHANGE UP (ref 3.8–10.5)
WBC # FLD AUTO: 5.75 K/UL — SIGNIFICANT CHANGE UP (ref 3.8–10.5)
WBC # FLD AUTO: 5.78 K/UL — SIGNIFICANT CHANGE UP (ref 3.8–10.5)
WBC # FLD AUTO: 6.08 K/UL — SIGNIFICANT CHANGE UP (ref 3.8–10.5)
WBC # FLD AUTO: 6.23 K/UL — SIGNIFICANT CHANGE UP (ref 3.8–10.5)
WBC # FLD AUTO: 6.38 K/UL — SIGNIFICANT CHANGE UP (ref 3.8–10.5)
WBC # FLD AUTO: 6.42 K/UL — SIGNIFICANT CHANGE UP (ref 3.8–10.5)
WBC # FLD AUTO: 6.43 K/UL — SIGNIFICANT CHANGE UP (ref 3.8–10.5)
WBC # FLD AUTO: 7.25 K/UL — SIGNIFICANT CHANGE UP (ref 3.8–10.5)
WBC # FLD AUTO: 7.42 K/UL — SIGNIFICANT CHANGE UP (ref 3.8–10.5)
WBC # FLD AUTO: 7.43 K/UL — SIGNIFICANT CHANGE UP (ref 3.8–10.5)
WBC # FLD AUTO: 7.43 K/UL — SIGNIFICANT CHANGE UP (ref 3.8–10.5)
WBC # FLD AUTO: 7.45 K/UL — SIGNIFICANT CHANGE UP (ref 3.8–10.5)
WBC # FLD AUTO: 7.47 K/UL — SIGNIFICANT CHANGE UP (ref 3.8–10.5)
WBC # FLD AUTO: 7.49 K/UL — SIGNIFICANT CHANGE UP (ref 3.8–10.5)
WBC # FLD AUTO: 7.53 K/UL — SIGNIFICANT CHANGE UP (ref 3.8–10.5)
WBC # FLD AUTO: 7.54 K/UL — SIGNIFICANT CHANGE UP (ref 3.8–10.5)
WBC # FLD AUTO: 7.69 K/UL — SIGNIFICANT CHANGE UP (ref 3.8–10.5)
WBC # FLD AUTO: 7.7 K/UL — SIGNIFICANT CHANGE UP (ref 3.8–10.5)
WBC # FLD AUTO: 7.71 K/UL — SIGNIFICANT CHANGE UP (ref 3.8–10.5)
WBC # FLD AUTO: 7.78 K/UL — SIGNIFICANT CHANGE UP (ref 3.8–10.5)
WBC # FLD AUTO: 7.8 K/UL — SIGNIFICANT CHANGE UP (ref 3.8–10.5)
WBC # FLD AUTO: 7.83 K/UL — SIGNIFICANT CHANGE UP (ref 3.8–10.5)
WBC # FLD AUTO: 7.94 K/UL — SIGNIFICANT CHANGE UP (ref 3.8–10.5)
WBC # FLD AUTO: 8.03 K/UL — SIGNIFICANT CHANGE UP (ref 3.8–10.5)
WBC # FLD AUTO: 8.08 K/UL — SIGNIFICANT CHANGE UP (ref 3.8–10.5)
WBC # FLD AUTO: 8.12 K/UL — SIGNIFICANT CHANGE UP (ref 3.8–10.5)
WBC # FLD AUTO: 8.13 K/UL — SIGNIFICANT CHANGE UP (ref 3.8–10.5)
WBC # FLD AUTO: 8.16 K/UL — SIGNIFICANT CHANGE UP (ref 3.8–10.5)
WBC # FLD AUTO: 8.55 K/UL — SIGNIFICANT CHANGE UP (ref 3.8–10.5)
WBC # FLD AUTO: 8.7 K/UL — SIGNIFICANT CHANGE UP (ref 3.8–10.5)
WBC # FLD AUTO: 8.72 K/UL — SIGNIFICANT CHANGE UP (ref 3.8–10.5)
WBC # FLD AUTO: 8.73 K/UL — SIGNIFICANT CHANGE UP (ref 3.8–10.5)
WBC # FLD AUTO: 8.81 K/UL — SIGNIFICANT CHANGE UP (ref 3.8–10.5)
WBC # FLD AUTO: 8.89 K/UL — SIGNIFICANT CHANGE UP (ref 3.8–10.5)
WBC # FLD AUTO: 9 K/UL — SIGNIFICANT CHANGE UP (ref 3.8–10.5)
WBC # FLD AUTO: 9.09 K/UL — SIGNIFICANT CHANGE UP (ref 3.8–10.5)
WBC # FLD AUTO: 9.17 K/UL — SIGNIFICANT CHANGE UP (ref 3.8–10.5)
WBC # FLD AUTO: 9.46 K/UL — SIGNIFICANT CHANGE UP (ref 3.8–10.5)
WBC # FLD AUTO: 9.56 K/UL — SIGNIFICANT CHANGE UP (ref 3.8–10.5)
WBC # FLD AUTO: 9.62 K/UL — SIGNIFICANT CHANGE UP (ref 3.8–10.5)
WBC # FLD AUTO: 9.64 K/UL — SIGNIFICANT CHANGE UP (ref 3.8–10.5)
WBC # FLD AUTO: 9.65 K/UL — SIGNIFICANT CHANGE UP (ref 3.8–10.5)
WBC # FLD AUTO: 9.86 K/UL — SIGNIFICANT CHANGE UP (ref 3.8–10.5)
WBC # FLD AUTO: 9.99 K/UL — SIGNIFICANT CHANGE UP (ref 3.8–10.5)
WBC UR QL: 1 /HPF — SIGNIFICANT CHANGE UP (ref 0–5)
WBC UR QL: 125 /HPF — HIGH (ref 0–5)
WBC UR QL: 2 /HPF — SIGNIFICANT CHANGE UP (ref 0–5)
WNV IGG TITR FLD: NEGATIVE — SIGNIFICANT CHANGE UP
WNV IGG TITR FLD: NEGATIVE — SIGNIFICANT CHANGE UP
WNV IGM SPEC QL: NEGATIVE — SIGNIFICANT CHANGE UP
WNV IGM SPEC QL: NEGATIVE — SIGNIFICANT CHANGE UP
WNV RNA SPEC QL NAA+PROBE: SIGNIFICANT CHANGE UP
WNV RNA SPEC QL NAA+PROBE: SIGNIFICANT CHANGE UP

## 2024-01-01 PROCEDURE — 99232 SBSQ HOSP IP/OBS MODERATE 35: CPT

## 2024-01-01 PROCEDURE — 88360 TUMOR IMMUNOHISTOCHEM/MANUAL: CPT | Mod: 26

## 2024-01-01 PROCEDURE — 99233 SBSQ HOSP IP/OBS HIGH 50: CPT

## 2024-01-01 PROCEDURE — 99231 SBSQ HOSP IP/OBS SF/LOW 25: CPT | Mod: FS

## 2024-01-01 PROCEDURE — 93975 VASCULAR STUDY: CPT | Mod: 26

## 2024-01-01 PROCEDURE — 95720 EEG PHY/QHP EA INCR W/VEEG: CPT

## 2024-01-01 PROCEDURE — 99291 CRITICAL CARE FIRST HOUR: CPT

## 2024-01-01 PROCEDURE — 93308 TTE F-UP OR LMTD: CPT | Mod: 26

## 2024-01-01 PROCEDURE — 93010 ELECTROCARDIOGRAM REPORT: CPT

## 2024-01-01 PROCEDURE — 70450 CT HEAD/BRAIN W/O DYE: CPT | Mod: 26

## 2024-01-01 PROCEDURE — 76705 ECHO EXAM OF ABDOMEN: CPT | Mod: 26,59

## 2024-01-01 PROCEDURE — 71045 X-RAY EXAM CHEST 1 VIEW: CPT | Mod: 26

## 2024-01-01 PROCEDURE — ZZZZZ: CPT

## 2024-01-01 PROCEDURE — 99291 CRITICAL CARE FIRST HOUR: CPT | Mod: 25

## 2024-01-01 PROCEDURE — 99233 SBSQ HOSP IP/OBS HIGH 50: CPT | Mod: GC

## 2024-01-01 PROCEDURE — 36556 INSERT NON-TUNNEL CV CATH: CPT | Mod: 78

## 2024-01-01 PROCEDURE — 31600 PLANNED TRACHEOSTOMY: CPT | Mod: 58

## 2024-01-01 PROCEDURE — 93010 ELECTROCARDIOGRAM REPORT: CPT | Mod: 76

## 2024-01-01 PROCEDURE — 74018 RADEX ABDOMEN 1 VIEW: CPT | Mod: 26

## 2024-01-01 PROCEDURE — 99233 SBSQ HOSP IP/OBS HIGH 50: CPT | Mod: GC,24

## 2024-01-01 PROCEDURE — 93325 DOPPLER ECHO COLOR FLOW MAPG: CPT | Mod: 26

## 2024-01-01 PROCEDURE — 99291 CRITICAL CARE FIRST HOUR: CPT | Mod: 24

## 2024-01-01 PROCEDURE — 99223 1ST HOSP IP/OBS HIGH 75: CPT | Mod: GC

## 2024-01-01 PROCEDURE — 90945 DIALYSIS ONE EVALUATION: CPT

## 2024-01-01 PROCEDURE — 71250 CT THORAX DX C-: CPT | Mod: 26

## 2024-01-01 PROCEDURE — 43752 NASAL/OROGASTRIC W/TUBE PLMT: CPT

## 2024-01-01 PROCEDURE — 99292 CRITICAL CARE ADDL 30 MIN: CPT

## 2024-01-01 PROCEDURE — 99232 SBSQ HOSP IP/OBS MODERATE 35: CPT | Mod: GC,24

## 2024-01-01 PROCEDURE — 95718 EEG PHYS/QHP 2-12 HR W/VEEG: CPT

## 2024-01-01 PROCEDURE — 93975 VASCULAR STUDY: CPT | Mod: 26,59

## 2024-01-01 PROCEDURE — 71045 X-RAY EXAM CHEST 1 VIEW: CPT | Mod: 26,77

## 2024-01-01 PROCEDURE — 76705 ECHO EXAM OF ABDOMEN: CPT | Mod: 26,XU

## 2024-01-01 PROCEDURE — 99291 CRITICAL CARE FIRST HOUR: CPT | Mod: FS

## 2024-01-01 PROCEDURE — 99232 SBSQ HOSP IP/OBS MODERATE 35: CPT | Mod: FS,24

## 2024-01-01 PROCEDURE — 90945 DIALYSIS ONE EVALUATION: CPT | Mod: GC

## 2024-01-01 PROCEDURE — 99223 1ST HOSP IP/OBS HIGH 75: CPT

## 2024-01-01 PROCEDURE — 99232 SBSQ HOSP IP/OBS MODERATE 35: CPT | Mod: GC

## 2024-01-01 PROCEDURE — 71260 CT THORAX DX C+: CPT | Mod: 26

## 2024-01-01 PROCEDURE — 88309 TISSUE EXAM BY PATHOLOGIST: CPT | Mod: 26

## 2024-01-01 PROCEDURE — 36800 INSERTION OF CANNULA: CPT | Mod: RT

## 2024-01-01 PROCEDURE — 99222 1ST HOSP IP/OBS MODERATE 55: CPT | Mod: GC

## 2024-01-01 PROCEDURE — 88341 IMHCHEM/IMCYTCHM EA ADD ANTB: CPT | Mod: 26,59

## 2024-01-01 PROCEDURE — 76937 US GUIDE VASCULAR ACCESS: CPT | Mod: 26

## 2024-01-01 PROCEDURE — 99291 CRITICAL CARE FIRST HOUR: CPT | Mod: FS,24

## 2024-01-01 PROCEDURE — 76870 US EXAM SCROTUM: CPT | Mod: 26

## 2024-01-01 PROCEDURE — 44310 ILEOSTOMY/JEJUNOSTOMY: CPT

## 2024-01-01 PROCEDURE — 99291 CRITICAL CARE FIRST HOUR: CPT | Mod: GC

## 2024-01-01 PROCEDURE — 49900 REPAIR OF ABDOMINAL WALL: CPT | Mod: 78,GC

## 2024-01-01 PROCEDURE — 99291 CRITICAL CARE FIRST HOUR: CPT | Mod: 24,57

## 2024-01-01 PROCEDURE — 90935 HEMODIALYSIS ONE EVALUATION: CPT | Mod: GC

## 2024-01-01 PROCEDURE — 71045 X-RAY EXAM CHEST 1 VIEW: CPT | Mod: 26,76

## 2024-01-01 PROCEDURE — 44150 REMOVAL OF COLON: CPT | Mod: GC

## 2024-01-01 PROCEDURE — 76937 US GUIDE VASCULAR ACCESS: CPT | Mod: 26,59

## 2024-01-01 PROCEDURE — 72192 CT PELVIS W/O DYE: CPT | Mod: 26

## 2024-01-01 PROCEDURE — 74176 CT ABD & PELVIS W/O CONTRAST: CPT | Mod: 26

## 2024-01-01 PROCEDURE — 93306 TTE W/DOPPLER COMPLETE: CPT | Mod: 26

## 2024-01-01 PROCEDURE — 93971 EXTREMITY STUDY: CPT | Mod: 26,LT

## 2024-01-01 PROCEDURE — 99232 SBSQ HOSP IP/OBS MODERATE 35: CPT | Mod: GC,25

## 2024-01-01 PROCEDURE — 88313 SPECIAL STAINS GROUP 2: CPT | Mod: 26

## 2024-01-01 PROCEDURE — 93971 EXTREMITY STUDY: CPT | Mod: 26,RT

## 2024-01-01 PROCEDURE — 99222 1ST HOSP IP/OBS MODERATE 55: CPT | Mod: FS

## 2024-01-01 PROCEDURE — 31600 PLANNED TRACHEOSTOMY: CPT

## 2024-01-01 PROCEDURE — 93321 DOPPLER ECHO F-UP/LMTD STD: CPT | Mod: 26

## 2024-01-01 PROCEDURE — 86078 PHYS BLOOD BANK SERV REACTJ: CPT

## 2024-01-01 PROCEDURE — 99231 SBSQ HOSP IP/OBS SF/LOW 25: CPT

## 2024-01-01 PROCEDURE — 36010 PLACE CATHETER IN VEIN: CPT | Mod: LT

## 2024-01-01 PROCEDURE — 36569 INSJ PICC 5 YR+ W/O IMAGING: CPT

## 2024-01-01 PROCEDURE — 99232 SBSQ HOSP IP/OBS MODERATE 35: CPT | Mod: FS,24,57

## 2024-01-01 PROCEDURE — 99233 SBSQ HOSP IP/OBS HIGH 50: CPT | Mod: 25

## 2024-01-01 PROCEDURE — 74177 CT ABD & PELVIS W/CONTRAST: CPT | Mod: 26

## 2024-01-01 PROCEDURE — 47135 TRANSPLANTATION OF LIVER: CPT | Mod: GC

## 2024-01-01 PROCEDURE — 99222 1ST HOSP IP/OBS MODERATE 55: CPT

## 2024-01-01 PROCEDURE — 99232 SBSQ HOSP IP/OBS MODERATE 35: CPT | Mod: FS

## 2024-01-01 PROCEDURE — 99291 CRITICAL CARE FIRST HOUR: CPT | Mod: FS,25

## 2024-01-01 PROCEDURE — 33967 INSERT I-AORT PERCUT DEVICE: CPT

## 2024-01-01 PROCEDURE — 88307 TISSUE EXAM BY PATHOLOGIST: CPT | Mod: 26

## 2024-01-01 PROCEDURE — 36620 INSERTION CATHETER ARTERY: CPT

## 2024-01-01 PROCEDURE — 36620 INSERTION CATHETER ARTERY: CPT | Mod: 78

## 2024-01-01 PROCEDURE — 88342 IMHCHEM/IMCYTCHM 1ST ANTB: CPT | Mod: 26,59

## 2024-01-01 PROCEDURE — 88300 SURGICAL PATH GROSS: CPT | Mod: 26

## 2024-01-01 PROCEDURE — 44310 ILEOSTOMY/JEJUNOSTOMY: CPT | Mod: 52,78

## 2024-01-01 PROCEDURE — 74174 CTA ABD&PLVS W/CONTRAST: CPT | Mod: 26

## 2024-01-01 PROCEDURE — 44360 SMALL BOWEL ENDOSCOPY: CPT | Mod: GC

## 2024-01-01 PROCEDURE — 99221 1ST HOSP IP/OBS SF/LOW 40: CPT

## 2024-01-01 PROCEDURE — 88304 TISSUE EXAM BY PATHOLOGIST: CPT | Mod: 26

## 2024-01-01 DEVICE — KIT CVC 2LUM MAC 9FR CHG: Type: IMPLANTABLE DEVICE | Status: FUNCTIONAL

## 2024-01-01 DEVICE — CATH THERMDIL SWAN GANZ VIP 7.5FR: Type: IMPLANTABLE DEVICE | Site: ABDOMINAL | Status: FUNCTIONAL

## 2024-01-01 DEVICE — CATH IAB SENSATION PLUS FIBER OPTIC 8 FR. 50CC: Type: IMPLANTABLE DEVICE | Status: FUNCTIONAL

## 2024-01-01 DEVICE — STAPLER COVIDIEN ENDO GIA 80-3.8MM BLUE: Type: IMPLANTABLE DEVICE | Status: FUNCTIONAL

## 2024-01-01 DEVICE — SURGICEL 2 X 14": Type: IMPLANTABLE DEVICE | Status: FUNCTIONAL

## 2024-01-01 DEVICE — STAPLER COVIDIEN TA 90 BLUE: Type: IMPLANTABLE DEVICE | Status: FUNCTIONAL

## 2024-01-01 DEVICE — SURGICEL NU-KNIT 6 X 9": Type: IMPLANTABLE DEVICE | Site: ABDOMINAL | Status: FUNCTIONAL

## 2024-01-01 DEVICE — SHEATH INTRODUCER TERUMO PINNACLE CORONARY 8FR X 10CM X 0.038" MINI WIRE: Type: IMPLANTABLE DEVICE | Status: FUNCTIONAL

## 2024-01-01 DEVICE — STAPLER ETHICON GST ECHELON 45MM WHITE RELOAD: Type: IMPLANTABLE DEVICE | Site: ABDOMINAL | Status: FUNCTIONAL

## 2024-01-01 DEVICE — KIT A-LINE 1LUM 20G X 12CM SAFE KIT: Type: IMPLANTABLE DEVICE | Site: ABDOMINAL | Status: FUNCTIONAL

## 2024-01-01 RX ORDER — ACETAMINOPHEN 160 MG/5ML
500 SUSPENSION ORAL EVERY 6 HOURS
Refills: 0 | Status: COMPLETED | OUTPATIENT
Start: 2024-01-01 | End: 2024-01-01

## 2024-01-01 RX ORDER — METOPROLOL SUCCINATE 25 MG
5 TABLET, EXTENDED RELEASE 24 HR ORAL ONCE
Refills: 0 | Status: COMPLETED | OUTPATIENT
Start: 2024-01-01 | End: 2024-01-01

## 2024-01-01 RX ORDER — POTASSIUM PHOSPHATE, MONOBASIC POTASSIUM PHOSPHATE, DIBASIC 236; 224 MG/ML; MG/ML
30 INJECTION, SOLUTION INTRAVENOUS ONCE
Refills: 0 | Status: COMPLETED | OUTPATIENT
Start: 2024-01-01 | End: 2024-01-01

## 2024-01-01 RX ORDER — OLANZAPINE 10 MG/1
10 TABLET, FILM COATED ORAL ONCE
Refills: 0 | Status: DISCONTINUED | OUTPATIENT
Start: 2024-01-01 | End: 2024-01-01

## 2024-01-01 RX ORDER — MYCOPHENOLATE MOFETIL 200 MG/ML
1000 POWDER, FOR SUSPENSION ORAL
Refills: 0 | Status: DISCONTINUED | OUTPATIENT
Start: 2024-01-01 | End: 2024-01-01

## 2024-01-01 RX ORDER — SODIUM CHLORIDE 9 G/ML
1000 INJECTION, SOLUTION INTRAVENOUS
Refills: 0 | Status: DISCONTINUED | OUTPATIENT
Start: 2024-01-01 | End: 2024-01-01

## 2024-01-01 RX ORDER — SODIUM BICARBONATE 42 MG/ML
0.07 INJECTION, SOLUTION INTRAVENOUS
Qty: 75 | Refills: 0 | Status: DISCONTINUED | OUTPATIENT
Start: 2024-01-01 | End: 2024-01-01

## 2024-01-01 RX ORDER — DM/PSEUDOEPHED/ACETAMINOPHEN 10-30-250
25 CAPSULE ORAL ONCE
Refills: 0 | Status: COMPLETED | OUTPATIENT
Start: 2024-01-01 | End: 2024-01-01

## 2024-01-01 RX ORDER — POTASSIUM PHOSPHATE, MONOBASIC POTASSIUM PHOSPHATE, DIBASIC 236; 224 MG/ML; MG/ML
15 INJECTION, SOLUTION INTRAVENOUS ONCE
Refills: 0 | Status: COMPLETED | OUTPATIENT
Start: 2024-01-01 | End: 2024-01-01

## 2024-01-01 RX ORDER — SODIUM CHLORIDE 9 G/ML
400 INJECTION, SOLUTION INTRAVENOUS
Refills: 0 | Status: DISCONTINUED | OUTPATIENT
Start: 2024-01-01 | End: 2024-01-01

## 2024-01-01 RX ORDER — SODIUM BICARBONATE 42 MG/ML
50 INJECTION, SOLUTION INTRAVENOUS ONCE
Refills: 0 | Status: COMPLETED | OUTPATIENT
Start: 2024-01-01 | End: 2024-01-01

## 2024-01-01 RX ORDER — GLUCAGON 3 MG/1
1 POWDER NASAL ONCE
Refills: 0 | Status: DISCONTINUED | OUTPATIENT
Start: 2024-01-01 | End: 2024-01-01

## 2024-01-01 RX ORDER — URSODIOL 250 MG/1
300 TABLET, FILM COATED ORAL EVERY 12 HOURS
Refills: 0 | Status: DISCONTINUED | OUTPATIENT
Start: 2024-01-01 | End: 2024-01-01

## 2024-01-01 RX ORDER — ALBUMIN HUMAN 50 G/1000ML
250 SOLUTION INTRAVENOUS ONCE
Refills: 0 | Status: COMPLETED | OUTPATIENT
Start: 2024-01-01 | End: 2024-01-01

## 2024-01-01 RX ORDER — BACTERIOSTATIC SODIUM CHLORIDE 0.9 %
4 VIAL (ML) INJECTION EVERY 6 HOURS
Refills: 0 | Status: COMPLETED | OUTPATIENT
Start: 2024-01-01 | End: 2024-01-01

## 2024-01-01 RX ORDER — HALOPERIDOL 10 MG/1
2.5 TABLET ORAL ONCE
Refills: 0 | Status: COMPLETED | OUTPATIENT
Start: 2024-01-01 | End: 2024-01-01

## 2024-01-01 RX ORDER — CYCLOSPORINE 100 MG/1
50 CAPSULE, GELATIN COATED ORAL EVERY 12 HOURS
Refills: 0 | Status: DISCONTINUED | OUTPATIENT
Start: 2024-01-01 | End: 2024-01-01

## 2024-01-01 RX ORDER — LINACLOTIDE 290 UG/1
290 CAPSULE, GELATIN COATED ORAL DAILY
Refills: 0 | Status: DISCONTINUED | OUTPATIENT
Start: 2024-01-01 | End: 2024-01-01

## 2024-01-01 RX ORDER — METHYLPREDNISOLONE ACETATE 40 MG/ML
16 VIAL (ML) INJECTION
Refills: 0 | Status: DISCONTINUED | OUTPATIENT
Start: 2024-01-01 | End: 2024-01-01

## 2024-01-01 RX ORDER — HYDROMORPHONE HYDROCHLORIDE 4 MG/ML
0.5 INJECTION, SOLUTION INTRAMUSCULAR; INTRAVENOUS; SUBCUTANEOUS
Refills: 0 | Status: DISCONTINUED | OUTPATIENT
Start: 2024-01-01 | End: 2024-01-01

## 2024-01-01 RX ORDER — BUMETANIDE 2 MG/1
2 TABLET ORAL ONCE
Refills: 0 | Status: COMPLETED | OUTPATIENT
Start: 2024-01-01 | End: 2024-01-01

## 2024-01-01 RX ORDER — PANTOPRAZOLE 20 MG/1
40 TABLET, DELAYED RELEASE ORAL DAILY
Refills: 0 | Status: DISCONTINUED | OUTPATIENT
Start: 2024-01-01 | End: 2024-01-01

## 2024-01-01 RX ORDER — VALGANCICLOVIR HYDROCHLORIDE 50 MG/ML
450 POWDER, FOR SOLUTION ORAL DAILY
Refills: 0 | Status: DISCONTINUED | OUTPATIENT
Start: 2024-01-01 | End: 2024-01-01

## 2024-01-01 RX ORDER — DEXTROSE MONOHYDRATE, SODIUM CHLORIDE, AND POTASSIUM CHLORIDE 50; 2.25; 2.24 G/1000ML; G/1000ML; G/1000ML
1000 INJECTION, SOLUTION INTRAVENOUS
Refills: 0 | Status: DISCONTINUED | OUTPATIENT
Start: 2024-01-01 | End: 2024-01-01

## 2024-01-01 RX ORDER — ASPIRIN 81 MG/1
81 TABLET, COATED ORAL DAILY
Refills: 0 | Status: DISCONTINUED | OUTPATIENT
Start: 2024-01-01 | End: 2024-01-01

## 2024-01-01 RX ORDER — ANTISEPTIC SURGICAL SCRUB 0.04 MG/ML
15 SOLUTION TOPICAL EVERY 12 HOURS
Refills: 0 | Status: DISCONTINUED | OUTPATIENT
Start: 2024-01-01 | End: 2024-01-01

## 2024-01-01 RX ORDER — CASPOFUNGIN ACETATE 5 MG/ML
INJECTION, POWDER, LYOPHILIZED, FOR SOLUTION INTRAVENOUS
Refills: 0 | Status: DISCONTINUED | OUTPATIENT
Start: 2024-01-01 | End: 2024-01-01

## 2024-01-01 RX ORDER — MAGNESIUM SULFATE 0.8 MEQ/ML
2 AMPUL (ML) INJECTION ONCE
Refills: 0 | Status: COMPLETED | OUTPATIENT
Start: 2024-01-01 | End: 2024-01-01

## 2024-01-01 RX ORDER — PHENYLEPHRINE HYDROCHLORIDE 10 MG/ML
5.2 INJECTION INTRAVENOUS
Qty: 160 | Refills: 0 | Status: DISCONTINUED | OUTPATIENT
Start: 2024-01-01 | End: 2024-01-01

## 2024-01-01 RX ORDER — FENTANYL CITRATE 50 UG/ML
25 INJECTION INTRAMUSCULAR; INTRAVENOUS ONCE
Refills: 0 | Status: DISCONTINUED | OUTPATIENT
Start: 2024-01-01 | End: 2024-01-01

## 2024-01-01 RX ORDER — OXYCODONE HYDROCHLORIDE 30 MG/1
5 TABLET ORAL ONCE
Refills: 0 | Status: DISCONTINUED | OUTPATIENT
Start: 2024-01-01 | End: 2024-01-01

## 2024-01-01 RX ORDER — HYDROMORPHONE HYDROCHLORIDE 4 MG/ML
0.25 INJECTION, SOLUTION INTRAMUSCULAR; INTRAVENOUS; SUBCUTANEOUS ONCE
Refills: 0 | Status: DISCONTINUED | OUTPATIENT
Start: 2024-01-01 | End: 2024-01-01

## 2024-01-01 RX ORDER — LEVETIRACETAM 750 MG/1
250 TABLET, FILM COATED ORAL EVERY 12 HOURS
Refills: 0 | Status: DISCONTINUED | OUTPATIENT
Start: 2024-01-01 | End: 2024-01-01

## 2024-01-01 RX ORDER — QUETIAPINE FUMARATE 300 MG/1
50 TABLET ORAL AT BEDTIME
Refills: 0 | Status: DISCONTINUED | OUTPATIENT
Start: 2024-01-01 | End: 2024-01-01

## 2024-01-01 RX ORDER — LINEZOLID 600 MG/300ML
600 INJECTION, SOLUTION INTRAVENOUS EVERY 12 HOURS
Refills: 0 | Status: DISCONTINUED | OUTPATIENT
Start: 2024-01-01 | End: 2024-01-01

## 2024-01-01 RX ORDER — TOBRAMYCIN 40 MG/ML
650 INJECTION INTRAMUSCULAR; INTRAVENOUS ONCE
Refills: 0 | Status: COMPLETED | OUTPATIENT
Start: 2024-01-01 | End: 2024-01-01

## 2024-01-01 RX ORDER — ALBUMIN HUMAN 50 G/1000ML
250 SOLUTION INTRAVENOUS
Refills: 0 | Status: COMPLETED | OUTPATIENT
Start: 2024-01-01 | End: 2024-01-01

## 2024-01-01 RX ORDER — MEROPENEM 500 MG/20ML
1000 INJECTION INTRAVENOUS EVERY 8 HOURS
Refills: 0 | Status: COMPLETED | OUTPATIENT
Start: 2024-01-01 | End: 2024-01-01

## 2024-01-01 RX ORDER — MIRTAZAPINE 30 MG/1
1 TABLET, FILM COATED ORAL
Refills: 0 | DISCHARGE

## 2024-01-01 RX ORDER — FENTANYL CITRATE 50 UG/ML
50 INJECTION INTRAMUSCULAR; INTRAVENOUS ONCE
Refills: 0 | Status: DISCONTINUED | OUTPATIENT
Start: 2024-01-01 | End: 2024-01-01

## 2024-01-01 RX ORDER — BACTERIOSTATIC SODIUM CHLORIDE 0.9 %
1000 VIAL (ML) INJECTION
Refills: 0 | Status: DISCONTINUED | OUTPATIENT
Start: 2024-01-01 | End: 2024-01-01

## 2024-01-01 RX ORDER — ATOVAQUONE 750 MG/5ML
1500 SUSPENSION ORAL DAILY
Refills: 0 | Status: DISCONTINUED | OUTPATIENT
Start: 2024-01-01 | End: 2024-01-01

## 2024-01-01 RX ORDER — VALGANCICLOVIR HYDROCHLORIDE 50 MG/ML
450 POWDER, FOR SOLUTION ORAL ONCE
Refills: 0 | Status: COMPLETED | OUTPATIENT
Start: 2024-01-01 | End: 2024-01-01

## 2024-01-01 RX ORDER — APIXABAN 5 MG/1
2.5 TABLET, FILM COATED ORAL EVERY 12 HOURS
Refills: 0 | Status: DISCONTINUED | OUTPATIENT
Start: 2024-01-01 | End: 2024-01-01

## 2024-01-01 RX ORDER — PANTOPRAZOLE 20 MG/1
40 TABLET, DELAYED RELEASE ORAL EVERY 12 HOURS
Refills: 0 | Status: DISCONTINUED | OUTPATIENT
Start: 2024-01-01 | End: 2025-01-01

## 2024-01-01 RX ORDER — ACETAMINOPHEN, DIPHENHYDRAMINE HCL, PHENYLEPHRINE HCL 325; 25; 5 MG/1; MG/1; MG/1
5 TABLET ORAL AT BEDTIME
Refills: 0 | Status: DISCONTINUED | OUTPATIENT
Start: 2024-01-01 | End: 2024-01-01

## 2024-01-01 RX ORDER — FLUCONAZOLE 100 MG/1
400 TABLET ORAL DAILY
Refills: 0 | Status: DISCONTINUED | OUTPATIENT
Start: 2024-01-01 | End: 2024-01-01

## 2024-01-01 RX ORDER — NOREPINEPHRINE BITARTRATE 1 MG/ML
0.04 INJECTION, SOLUTION, CONCENTRATE INTRAVENOUS
Qty: 8 | Refills: 0 | Status: DISCONTINUED | OUTPATIENT
Start: 2024-01-01 | End: 2024-01-01

## 2024-01-01 RX ORDER — METHYLPREDNISOLONE ACETATE 40 MG/ML
250 VIAL (ML) INJECTION EVERY 24 HOURS
Refills: 0 | Status: COMPLETED | OUTPATIENT
Start: 2024-01-01 | End: 2024-01-01

## 2024-01-01 RX ORDER — INSULIN LISPRO 100/ML
VIAL (ML) SUBCUTANEOUS EVERY 4 HOURS
Refills: 0 | Status: DISCONTINUED | OUTPATIENT
Start: 2024-01-01 | End: 2024-01-01

## 2024-01-01 RX ORDER — METOPROLOL SUCCINATE 25 MG
12.5 TABLET, EXTENDED RELEASE 24 HR ORAL EVERY 12 HOURS
Refills: 0 | Status: DISCONTINUED | OUTPATIENT
Start: 2024-01-01 | End: 2024-01-01

## 2024-01-01 RX ORDER — CYCLOSPORINE 100 MG/1
25 CAPSULE, GELATIN COATED ORAL ONCE
Refills: 0 | Status: COMPLETED | OUTPATIENT
Start: 2024-01-01 | End: 2024-01-01

## 2024-01-01 RX ORDER — PIPERACILLIN SODIUM AND TAZOBACTAM SODIUM 2; 250 G/50ML; MG/50ML
3.38 INJECTION, POWDER, FOR SOLUTION INTRAVENOUS ONCE
Refills: 0 | Status: COMPLETED | OUTPATIENT
Start: 2024-01-01 | End: 2024-01-01

## 2024-01-01 RX ORDER — ARGATROBAN 50 MG/50ML
0.12 INJECTION, SOLUTION INTRAVENOUS
Qty: 50 | Refills: 0 | Status: DISCONTINUED | OUTPATIENT
Start: 2024-01-01 | End: 2024-01-01

## 2024-01-01 RX ORDER — POTASSIUM CHLORIDE 750 MG/1
40 TABLET, EXTENDED RELEASE ORAL ONCE
Refills: 0 | Status: COMPLETED | OUTPATIENT
Start: 2024-01-01 | End: 2024-01-01

## 2024-01-01 RX ORDER — IPRATROPIUM BROMIDE AND ALBUTEROL SULFATE .5; 2.5 MG/3ML; MG/3ML
3 SOLUTION RESPIRATORY (INHALATION) EVERY 6 HOURS
Refills: 0 | Status: DISCONTINUED | OUTPATIENT
Start: 2024-01-01 | End: 2024-01-01

## 2024-01-01 RX ORDER — INSULIN NPH HUMAN ISOPHANE 100/ML (3)
18 INSULIN PEN (ML) SUBCUTANEOUS EVERY 6 HOURS
Refills: 0 | Status: DISCONTINUED | OUTPATIENT
Start: 2024-01-01 | End: 2024-01-01

## 2024-01-01 RX ORDER — QUETIAPINE FUMARATE 300 MG/1
12.5 TABLET ORAL ONCE
Refills: 0 | Status: COMPLETED | OUTPATIENT
Start: 2024-01-01 | End: 2024-01-01

## 2024-01-01 RX ORDER — METOPROLOL SUCCINATE 25 MG
50 TABLET, EXTENDED RELEASE 24 HR ORAL EVERY 8 HOURS
Refills: 0 | Status: DISCONTINUED | OUTPATIENT
Start: 2024-01-01 | End: 2024-01-01

## 2024-01-01 RX ORDER — SERTRALINE HCL 100 MG
100 TABLET ORAL DAILY
Refills: 0 | Status: DISCONTINUED | OUTPATIENT
Start: 2024-01-01 | End: 2024-01-01

## 2024-01-01 RX ORDER — APIXABAN 5 MG/1
2.5 TABLET, FILM COATED ORAL ONCE
Refills: 0 | Status: COMPLETED | OUTPATIENT
Start: 2024-01-01 | End: 2024-01-01

## 2024-01-01 RX ORDER — SUGAMMADEX 100 MG/ML
200 INJECTION, SOLUTION INTRAVENOUS ONCE
Refills: 0 | Status: COMPLETED | OUTPATIENT
Start: 2024-01-01 | End: 2024-01-01

## 2024-01-01 RX ORDER — DESMOPRESSIN ACETATE 4 UG/ML
35 INJECTION, SOLUTION INTRAVENOUS; SUBCUTANEOUS ONCE
Refills: 0 | Status: COMPLETED | OUTPATIENT
Start: 2024-01-01 | End: 2024-01-01

## 2024-01-01 RX ORDER — METOPROLOL SUCCINATE 25 MG
50 TABLET, EXTENDED RELEASE 24 HR ORAL EVERY 6 HOURS
Refills: 0 | Status: DISCONTINUED | OUTPATIENT
Start: 2024-01-01 | End: 2024-01-01

## 2024-01-01 RX ORDER — QUETIAPINE FUMARATE 300 MG/1
12.5 TABLET ORAL EVERY 12 HOURS
Refills: 0 | Status: DISCONTINUED | OUTPATIENT
Start: 2024-01-01 | End: 2024-01-01

## 2024-01-01 RX ORDER — MIDODRINE HYDROCHLORIDE 5 MG/1
10 TABLET ORAL EVERY 8 HOURS
Refills: 0 | Status: DISCONTINUED | OUTPATIENT
Start: 2024-01-01 | End: 2025-01-01

## 2024-01-01 RX ORDER — MYCOPHENOLATE MOFETIL 200 MG/ML
500 POWDER, FOR SUSPENSION ORAL ONCE
Refills: 0 | Status: COMPLETED | OUTPATIENT
Start: 2024-01-01 | End: 2024-01-01

## 2024-01-01 RX ORDER — ALBUMIN HUMAN 50 G/1000ML
100 SOLUTION INTRAVENOUS ONCE
Refills: 0 | Status: COMPLETED | OUTPATIENT
Start: 2024-01-01 | End: 2024-01-01

## 2024-01-01 RX ORDER — DM/PSEUDOEPHED/ACETAMINOPHEN 10-30-250
25 CAPSULE ORAL ONCE
Refills: 0 | Status: DISCONTINUED | OUTPATIENT
Start: 2024-01-01 | End: 2024-01-01

## 2024-01-01 RX ORDER — METOPROLOL SUCCINATE 25 MG
25 TABLET, EXTENDED RELEASE 24 HR ORAL EVERY 6 HOURS
Refills: 0 | Status: DISCONTINUED | OUTPATIENT
Start: 2024-01-01 | End: 2024-01-01

## 2024-01-01 RX ORDER — SODIUM PHOSPHATE, DIBASIC, ANHYDROUS, POTASSIUM PHOSPHATE, MONOBASIC, AND SODIUM PHOSPHATE, MONOBASIC, MONOHYDRATE 852; 155; 130 MG/1; MG/1; MG/1
2 TABLET, COATED ORAL ONCE
Refills: 0 | Status: COMPLETED | OUTPATIENT
Start: 2024-01-01 | End: 2024-01-01

## 2024-01-01 RX ORDER — METOPROLOL SUCCINATE 25 MG
100 TABLET, EXTENDED RELEASE 24 HR ORAL AT BEDTIME
Refills: 0 | Status: DISCONTINUED | OUTPATIENT
Start: 2024-01-01 | End: 2024-01-01

## 2024-01-01 RX ORDER — METHYLNALTREXONE BROMIDE 12 MG/.6ML
12 INJECTION, SOLUTION SUBCUTANEOUS ONCE
Refills: 0 | Status: COMPLETED | OUTPATIENT
Start: 2024-01-01 | End: 2024-01-01

## 2024-01-01 RX ORDER — BUDESONIDE 9 MG/1
0.5 TABLET, EXTENDED RELEASE ORAL
Refills: 0 | Status: DISCONTINUED | OUTPATIENT
Start: 2024-01-01 | End: 2024-01-01

## 2024-01-01 RX ORDER — AMIKACIN SULFATE 250 MG/ML
500 INJECTION INTRAMUSCULAR; INTRAVENOUS ONCE
Refills: 0 | Status: COMPLETED | OUTPATIENT
Start: 2024-01-01 | End: 2024-01-01

## 2024-01-01 RX ORDER — PANTOPRAZOLE 20 MG/1
40 TABLET, DELAYED RELEASE ORAL EVERY 12 HOURS
Refills: 0 | Status: DISCONTINUED | OUTPATIENT
Start: 2024-01-01 | End: 2024-01-01

## 2024-01-01 RX ORDER — SODIUM CHLORIDE 9 G/ML
1000 INJECTION, SOLUTION INTRAVENOUS ONCE
Refills: 0 | Status: COMPLETED | OUTPATIENT
Start: 2024-01-01 | End: 2024-01-01

## 2024-01-01 RX ORDER — AMPICILLIN SODIUM AND SULBACTAM SODIUM 2; 1 G/1; G/1
3 INJECTION, POWDER, FOR SOLUTION INTRAMUSCULAR; INTRAVENOUS ONCE
Refills: 0 | Status: DISCONTINUED | OUTPATIENT
Start: 2024-01-01 | End: 2024-01-01

## 2024-01-01 RX ORDER — INSULIN GLARGINE-YFGN 100 [IU]/ML
16 INJECTION, SOLUTION SUBCUTANEOUS AT BEDTIME
Refills: 0 | Status: DISCONTINUED | OUTPATIENT
Start: 2024-01-01 | End: 2024-01-01

## 2024-01-01 RX ORDER — ASCORBIC ACID 500 MG/ML
500 VIAL (ML) INJECTION DAILY
Refills: 0 | Status: DISCONTINUED | OUTPATIENT
Start: 2024-01-01 | End: 2024-01-01

## 2024-01-01 RX ORDER — SOD PHOSPHATE,MONOBASIC-DIBAS 3MMOL/ML
15 VIAL (ML) INTRAVENOUS ONCE
Refills: 0 | Status: COMPLETED | OUTPATIENT
Start: 2024-01-01 | End: 2024-01-01

## 2024-01-01 RX ORDER — SOD PHOSPHATE,MONOBASIC-DIBAS 3MMOL/ML
30 VIAL (ML) INTRAVENOUS ONCE
Refills: 0 | Status: COMPLETED | OUTPATIENT
Start: 2024-01-01 | End: 2024-01-01

## 2024-01-01 RX ORDER — POTASSIUM CHLORIDE 750 MG/1
20 TABLET, EXTENDED RELEASE ORAL ONCE
Refills: 0 | Status: COMPLETED | OUTPATIENT
Start: 2024-01-01 | End: 2024-01-01

## 2024-01-01 RX ORDER — OLANZAPINE 10 MG/1
10 TABLET, FILM COATED ORAL ONCE
Refills: 0 | Status: COMPLETED | OUTPATIENT
Start: 2024-01-01 | End: 2024-01-01

## 2024-01-01 RX ORDER — HEPARIN SOD,PORCINE/0.9 % NACL 5K/1000 ML
100 INTRAVENOUS SOLUTION INTRAVENOUS ONCE
Refills: 0 | Status: COMPLETED | OUTPATIENT
Start: 2024-01-01 | End: 2024-01-01

## 2024-01-01 RX ORDER — CALCIUM CHLORIDE 100 MG/ML
2 INJECTION INTRAVENOUS; INTRAVENTRICULAR ONCE
Refills: 0 | Status: DISCONTINUED | OUTPATIENT
Start: 2024-01-01 | End: 2024-01-01

## 2024-01-01 RX ORDER — MUPIROCIN 2 G/100G
1 CREAM TOPICAL EVERY 12 HOURS
Refills: 0 | Status: DISCONTINUED | OUTPATIENT
Start: 2024-01-01 | End: 2025-01-01

## 2024-01-01 RX ORDER — BACTERIOSTATIC SODIUM CHLORIDE 0.9 %
4 VIAL (ML) INJECTION EVERY 12 HOURS
Refills: 0 | Status: DISCONTINUED | OUTPATIENT
Start: 2024-01-01 | End: 2024-01-01

## 2024-01-01 RX ORDER — OXYCODONE HYDROCHLORIDE AND ACETAMINOPHEN 5; 325 MG/1; MG/1
1 TABLET ORAL EVERY 4 HOURS
Refills: 0 | Status: DISCONTINUED | OUTPATIENT
Start: 2024-01-01 | End: 2024-01-01

## 2024-01-01 RX ORDER — INSULIN NPH HUMAN ISOPHANE 100/ML (3)
12 INSULIN PEN (ML) SUBCUTANEOUS EVERY 6 HOURS
Refills: 0 | Status: DISCONTINUED | OUTPATIENT
Start: 2024-01-01 | End: 2024-01-01

## 2024-01-01 RX ORDER — GANCICLOVIR 250 MG/1
120 CAPSULE ORAL EVERY 24 HOURS
Refills: 0 | Status: DISCONTINUED | OUTPATIENT
Start: 2024-01-01 | End: 2024-01-01

## 2024-01-01 RX ORDER — INSULIN GLARGINE-YFGN 100 [IU]/ML
18 INJECTION, SOLUTION SUBCUTANEOUS AT BEDTIME
Refills: 0 | Status: DISCONTINUED | OUTPATIENT
Start: 2024-01-01 | End: 2024-01-01

## 2024-01-01 RX ORDER — MINOCYCLINE HCL 100 MG
CAPSULE ORAL
Refills: 0 | Status: DISCONTINUED | OUTPATIENT
Start: 2024-01-01 | End: 2024-01-01

## 2024-01-01 RX ORDER — SODIUM BICARBONATE 42 MG/ML
50 INJECTION, SOLUTION INTRAVENOUS
Refills: 0 | Status: COMPLETED | OUTPATIENT
Start: 2024-01-01 | End: 2024-01-01

## 2024-01-01 RX ORDER — OXYCODONE HYDROCHLORIDE 30 MG/1
2.5 TABLET ORAL ONCE
Refills: 0 | Status: DISCONTINUED | OUTPATIENT
Start: 2024-01-01 | End: 2024-01-01

## 2024-01-01 RX ORDER — CYCLOSPORINE 100 MG/1
25 CAPSULE, GELATIN COATED ORAL
Refills: 0 | Status: DISCONTINUED | OUTPATIENT
Start: 2024-01-01 | End: 2024-01-01

## 2024-01-01 RX ORDER — OXYCODONE HYDROCHLORIDE 30 MG/1
5 TABLET ORAL EVERY 4 HOURS
Refills: 0 | Status: DISCONTINUED | OUTPATIENT
Start: 2024-01-01 | End: 2024-01-01

## 2024-01-01 RX ORDER — INSULIN NPH HUMAN ISOPHANE 100/ML (3)
14 INSULIN PEN (ML) SUBCUTANEOUS EVERY 6 HOURS
Refills: 0 | Status: DISCONTINUED | OUTPATIENT
Start: 2024-01-01 | End: 2024-01-01

## 2024-01-01 RX ORDER — INSULIN NPH HUMAN ISOPHANE 100/ML (3)
3 INSULIN PEN (ML) SUBCUTANEOUS EVERY 8 HOURS
Refills: 0 | Status: DISCONTINUED | OUTPATIENT
Start: 2024-01-01 | End: 2024-01-01

## 2024-01-01 RX ORDER — ALBUMIN HUMAN 50 G/1000ML
100 SOLUTION INTRAVENOUS EVERY 8 HOURS
Refills: 0 | Status: DISCONTINUED | OUTPATIENT
Start: 2024-01-01 | End: 2024-01-01

## 2024-01-01 RX ORDER — BUPROPION HYDROCHLORIDE 150 MG/1
150 TABLET, EXTENDED RELEASE ORAL DAILY
Refills: 0 | Status: DISCONTINUED | OUTPATIENT
Start: 2024-01-01 | End: 2024-01-01

## 2024-01-01 RX ORDER — METOPROLOL SUCCINATE 25 MG
5 TABLET, EXTENDED RELEASE 24 HR ORAL EVERY 4 HOURS
Refills: 0 | Status: DISCONTINUED | OUTPATIENT
Start: 2024-01-01 | End: 2024-01-01

## 2024-01-01 RX ORDER — HYDROMORPHONE HYDROCHLORIDE 4 MG/ML
0.5 INJECTION, SOLUTION INTRAMUSCULAR; INTRAVENOUS; SUBCUTANEOUS ONCE
Refills: 0 | Status: DISCONTINUED | OUTPATIENT
Start: 2024-01-01 | End: 2024-01-01

## 2024-01-01 RX ORDER — PREDNISONE 5 MG/1
20 TABLET ORAL EVERY 24 HOURS
Refills: 0 | Status: DISCONTINUED | OUTPATIENT
Start: 2024-01-01 | End: 2024-01-01

## 2024-01-01 RX ORDER — INSULIN NPH HUMAN ISOPHANE 100/ML (3)
15 INSULIN PEN (ML) SUBCUTANEOUS EVERY 6 HOURS
Refills: 0 | Status: DISCONTINUED | OUTPATIENT
Start: 2024-01-01 | End: 2024-01-01

## 2024-01-01 RX ORDER — LIDOCAINE HYDROCHLORIDE 10 MG/ML
10 INJECTION EPIDURAL; INFILTRATION; INTRACAUDAL ONCE
Refills: 0 | Status: DISCONTINUED | OUTPATIENT
Start: 2024-01-01 | End: 2024-01-01

## 2024-01-01 RX ORDER — TAMSULOSIN HYDROCHLORIDE 0.4 MG/1
0.4 CAPSULE ORAL AT BEDTIME
Refills: 0 | Status: DISCONTINUED | OUTPATIENT
Start: 2024-01-01 | End: 2024-01-01

## 2024-01-01 RX ORDER — ETOMIDATE 2 MG/ML
20 INJECTION, SOLUTION INTRAVENOUS ONCE
Refills: 0 | Status: COMPLETED | OUTPATIENT
Start: 2024-01-01 | End: 2024-01-01

## 2024-01-01 RX ORDER — METOPROLOL SUCCINATE 25 MG
2.5 TABLET, EXTENDED RELEASE 24 HR ORAL EVERY 6 HOURS
Refills: 0 | Status: DISCONTINUED | OUTPATIENT
Start: 2024-01-01 | End: 2024-01-01

## 2024-01-01 RX ORDER — SULFAMETHOXAZOLE AND TRIMETHOPRIM 400; 80 MG/1; MG/1
10 TABLET ORAL DAILY
Refills: 0 | Status: DISCONTINUED | OUTPATIENT
Start: 2024-01-01 | End: 2024-01-01

## 2024-01-01 RX ORDER — LABETALOL HYDROCHLORIDE 300 MG/1
10 TABLET, FILM COATED ORAL ONCE
Refills: 0 | Status: COMPLETED | OUTPATIENT
Start: 2024-01-01 | End: 2024-01-01

## 2024-01-01 RX ORDER — VALGANCICLOVIR HYDROCHLORIDE 50 MG/ML
900 POWDER, FOR SOLUTION ORAL DAILY
Refills: 0 | Status: DISCONTINUED | OUTPATIENT
Start: 2024-01-01 | End: 2024-01-01

## 2024-01-01 RX ORDER — METOPROLOL SUCCINATE 25 MG
1 TABLET, EXTENDED RELEASE 24 HR ORAL
Refills: 0 | DISCHARGE

## 2024-01-01 RX ORDER — BUDESONIDE 9 MG/1
0.5 TABLET, EXTENDED RELEASE ORAL EVERY 12 HOURS
Refills: 0 | Status: DISCONTINUED | OUTPATIENT
Start: 2024-01-01 | End: 2025-01-01

## 2024-01-01 RX ORDER — MAGNESIUM SULFATE 0.8 MEQ/ML
2 AMPUL (ML) INJECTION ONCE
Refills: 0 | Status: DISCONTINUED | OUTPATIENT
Start: 2024-01-01 | End: 2024-01-01

## 2024-01-01 RX ORDER — QUETIAPINE FUMARATE 300 MG/1
12.5 TABLET ORAL THREE TIMES A DAY
Refills: 0 | Status: DISCONTINUED | OUTPATIENT
Start: 2024-01-01 | End: 2024-01-01

## 2024-01-01 RX ORDER — INSULIN LISPRO 100/ML
VIAL (ML) SUBCUTANEOUS EVERY 6 HOURS
Refills: 0 | Status: DISCONTINUED | OUTPATIENT
Start: 2024-01-01 | End: 2024-01-01

## 2024-01-01 RX ORDER — FLUCONAZOLE 100 MG/1
200 TABLET ORAL ONCE
Refills: 0 | Status: COMPLETED | OUTPATIENT
Start: 2024-01-01 | End: 2024-01-01

## 2024-01-01 RX ORDER — SULFAMETHOXAZOLE AND TRIMETHOPRIM 400; 80 MG/1; MG/1
190 TABLET ORAL EVERY 24 HOURS
Refills: 0 | Status: DISCONTINUED | OUTPATIENT
Start: 2024-01-01 | End: 2024-01-01

## 2024-01-01 RX ORDER — METOPROLOL SUCCINATE 25 MG
12.5 TABLET, EXTENDED RELEASE 24 HR ORAL EVERY 6 HOURS
Refills: 0 | Status: DISCONTINUED | OUTPATIENT
Start: 2024-01-01 | End: 2024-01-01

## 2024-01-01 RX ORDER — OXYCODONE HYDROCHLORIDE 30 MG/1
2.5 TABLET ORAL EVERY 4 HOURS
Refills: 0 | Status: DISCONTINUED | OUTPATIENT
Start: 2024-01-01 | End: 2024-01-01

## 2024-01-01 RX ORDER — CYCLOSPORINE 100 MG/1
75 CAPSULE, GELATIN COATED ORAL
Refills: 0 | Status: DISCONTINUED | OUTPATIENT
Start: 2024-01-01 | End: 2024-01-01

## 2024-01-01 RX ORDER — NOREPINEPHRINE BITARTRATE 1 MG/ML
0.05 INJECTION, SOLUTION, CONCENTRATE INTRAVENOUS
Qty: 8 | Refills: 0 | Status: DISCONTINUED | OUTPATIENT
Start: 2024-01-01 | End: 2024-01-01

## 2024-01-01 RX ORDER — INSULIN NPH HUMAN ISOPHANE 100/ML (3)
8 INSULIN PEN (ML) SUBCUTANEOUS EVERY 6 HOURS
Refills: 0 | Status: DISCONTINUED | OUTPATIENT
Start: 2024-01-01 | End: 2024-01-01

## 2024-01-01 RX ORDER — NOREPINEPHRINE BITARTRATE 1 MG/ML
0.01 INJECTION, SOLUTION, CONCENTRATE INTRAVENOUS
Qty: 8 | Refills: 0 | Status: DISCONTINUED | OUTPATIENT
Start: 2024-01-01 | End: 2024-01-01

## 2024-01-01 RX ORDER — FENTANYL CITRATE 50 UG/ML
25 INJECTION INTRAMUSCULAR; INTRAVENOUS EVERY 6 HOURS
Refills: 0 | Status: DISCONTINUED | OUTPATIENT
Start: 2024-01-01 | End: 2024-01-01

## 2024-01-01 RX ORDER — VALGANCICLOVIR HYDROCHLORIDE 50 MG/ML
450 POWDER, FOR SOLUTION ORAL
Refills: 0 | Status: DISCONTINUED | OUTPATIENT
Start: 2024-01-01 | End: 2024-01-01

## 2024-01-01 RX ORDER — POTASSIUM CHLORIDE 750 MG/1
10 TABLET, EXTENDED RELEASE ORAL
Refills: 0 | Status: COMPLETED | OUTPATIENT
Start: 2024-01-01 | End: 2024-01-01

## 2024-01-01 RX ORDER — INSULIN NPH HUMAN ISOPHANE 100/ML (3)
4 INSULIN PEN (ML) SUBCUTANEOUS EVERY 6 HOURS
Refills: 0 | Status: DISCONTINUED | OUTPATIENT
Start: 2024-01-01 | End: 2024-01-01

## 2024-01-01 RX ORDER — SERTRALINE HCL 100 MG
1 TABLET ORAL
Refills: 0 | DISCHARGE

## 2024-01-01 RX ORDER — CYCLOSPORINE 100 MG/1
50 CAPSULE, GELATIN COATED ORAL DAILY
Refills: 0 | Status: DISCONTINUED | OUTPATIENT
Start: 2024-01-01 | End: 2024-01-01

## 2024-01-01 RX ORDER — SENNOSIDES 8.6 MG
10 TABLET ORAL AT BEDTIME
Refills: 0 | Status: DISCONTINUED | OUTPATIENT
Start: 2024-01-01 | End: 2024-01-01

## 2024-01-01 RX ORDER — SULFAMETHOXAZOLE AND TRIMETHOPRIM 400; 80 MG/1; MG/1
1 TABLET ORAL DAILY
Refills: 0 | Status: DISCONTINUED | OUTPATIENT
Start: 2024-01-01 | End: 2024-01-01

## 2024-01-01 RX ORDER — MIDODRINE HYDROCHLORIDE 5 MG/1
10 TABLET ORAL EVERY 8 HOURS
Refills: 0 | Status: DISCONTINUED | OUTPATIENT
Start: 2024-01-01 | End: 2024-01-01

## 2024-01-01 RX ORDER — QUETIAPINE FUMARATE 300 MG/1
12.5 TABLET ORAL EVERY 8 HOURS
Refills: 0 | Status: DISCONTINUED | OUTPATIENT
Start: 2024-01-01 | End: 2024-01-01

## 2024-01-01 RX ORDER — ALBUMIN HUMAN 50 G/1000ML
500 SOLUTION INTRAVENOUS EVERY 8 HOURS
Refills: 0 | Status: COMPLETED | OUTPATIENT
Start: 2024-01-01 | End: 2024-01-01

## 2024-01-01 RX ORDER — DILTIAZEM HYDROCHLORIDE 60 MG/1
10 TABLET ORAL ONCE
Refills: 0 | Status: COMPLETED | OUTPATIENT
Start: 2024-01-01 | End: 2024-01-01

## 2024-01-01 RX ORDER — ATORVASTATIN CALCIUM 80 MG/1
80 TABLET, FILM COATED ORAL AT BEDTIME
Refills: 0 | Status: DISCONTINUED | OUTPATIENT
Start: 2024-01-01 | End: 2024-01-01

## 2024-01-01 RX ORDER — POLYETHYLENE GLYCOL 3350 17 G/17G
17 POWDER, FOR SOLUTION ORAL EVERY 24 HOURS
Refills: 0 | Status: DISCONTINUED | OUTPATIENT
Start: 2024-01-01 | End: 2024-01-01

## 2024-01-01 RX ORDER — CYCLOSPORINE 100 MG/1
50 CAPSULE, GELATIN COATED ORAL
Refills: 0 | Status: DISCONTINUED | OUTPATIENT
Start: 2024-01-01 | End: 2024-01-01

## 2024-01-01 RX ORDER — METHYLPREDNISOLONE ACETATE 40 MG/ML
32 VIAL (ML) INJECTION
Refills: 0 | Status: DISCONTINUED | OUTPATIENT
Start: 2024-01-01 | End: 2025-01-01

## 2024-01-01 RX ORDER — MYCOPHENOLATE MOFETIL 200 MG/ML
500 POWDER, FOR SUSPENSION ORAL
Refills: 0 | Status: DISCONTINUED | OUTPATIENT
Start: 2024-01-01 | End: 2024-01-01

## 2024-01-01 RX ORDER — IPRATROPIUM BROMIDE AND ALBUTEROL SULFATE .5; 2.5 MG/3ML; MG/3ML
3 SOLUTION RESPIRATORY (INHALATION) ONCE
Refills: 0 | Status: COMPLETED | OUTPATIENT
Start: 2024-01-01 | End: 2024-01-01

## 2024-01-01 RX ORDER — IPRATROPIUM BROMIDE AND ALBUTEROL SULFATE .5; 2.5 MG/3ML; MG/3ML
3 SOLUTION RESPIRATORY (INHALATION) EVERY 6 HOURS
Refills: 0 | Status: DISCONTINUED | OUTPATIENT
Start: 2024-01-01 | End: 2025-01-01

## 2024-01-01 RX ORDER — INSULIN NPH HUMAN ISOPHANE 100/ML (3)
9 INSULIN PEN (ML) SUBCUTANEOUS EVERY 6 HOURS
Refills: 0 | Status: DISCONTINUED | OUTPATIENT
Start: 2024-01-01 | End: 2024-01-01

## 2024-01-01 RX ORDER — HEPARIN SODIUM,PORCINE 10000/ML
1000 VIAL (ML) INJECTION
Qty: 25000 | Refills: 0 | Status: DISCONTINUED | OUTPATIENT
Start: 2024-01-01 | End: 2024-01-01

## 2024-01-01 RX ORDER — MIDODRINE HYDROCHLORIDE 5 MG/1
5 TABLET ORAL ONCE
Refills: 0 | Status: COMPLETED | OUTPATIENT
Start: 2024-01-01 | End: 2024-01-01

## 2024-01-01 RX ORDER — MEROPENEM 500 MG/20ML
1000 INJECTION INTRAVENOUS EVERY 12 HOURS
Refills: 0 | Status: DISCONTINUED | OUTPATIENT
Start: 2024-01-01 | End: 2024-01-01

## 2024-01-01 RX ORDER — POTASSIUM CHLORIDE 750 MG/1
40 TABLET, EXTENDED RELEASE ORAL ONCE
Refills: 0 | Status: DISCONTINUED | OUTPATIENT
Start: 2024-01-01 | End: 2024-01-01

## 2024-01-01 RX ORDER — NOREPINEPHRINE BITARTRATE 1 MG/ML
0.02 INJECTION, SOLUTION, CONCENTRATE INTRAVENOUS
Qty: 8 | Refills: 0 | Status: DISCONTINUED | OUTPATIENT
Start: 2024-01-01 | End: 2024-01-01

## 2024-01-01 RX ORDER — SODIUM BICARBONATE 42 MG/ML
1300 INJECTION, SOLUTION INTRAVENOUS
Refills: 0 | Status: DISCONTINUED | OUTPATIENT
Start: 2024-01-01 | End: 2024-01-01

## 2024-01-01 RX ORDER — ANTISEPTIC SURGICAL SCRUB 0.04 MG/ML
1 SOLUTION TOPICAL DAILY
Refills: 0 | Status: DISCONTINUED | OUTPATIENT
Start: 2024-01-01 | End: 2024-01-01

## 2024-01-01 RX ORDER — METOPROLOL SUCCINATE 25 MG
5 TABLET, EXTENDED RELEASE 24 HR ORAL ONCE
Refills: 0 | Status: DISCONTINUED | OUTPATIENT
Start: 2024-01-01 | End: 2024-01-01

## 2024-01-01 RX ORDER — CEFEPIME HCL 1 G
IV SOLUTION, PIGGYBACK, BOTTLE (EA) INTRAVENOUS
Refills: 0 | Status: DISCONTINUED | OUTPATIENT
Start: 2024-01-01 | End: 2024-01-01

## 2024-01-01 RX ORDER — ARIPIPRAZOLE 5 MG/1
2 TABLET ORAL DAILY
Refills: 0 | Status: DISCONTINUED | OUTPATIENT
Start: 2024-01-01 | End: 2024-01-01

## 2024-01-01 RX ORDER — INSULIN LISPRO 100/ML
VIAL (ML) SUBCUTANEOUS AT BEDTIME
Refills: 0 | Status: DISCONTINUED | OUTPATIENT
Start: 2024-01-01 | End: 2024-01-01

## 2024-01-01 RX ORDER — METOPROLOL SUCCINATE 25 MG
50 TABLET, EXTENDED RELEASE 24 HR ORAL ONCE
Refills: 0 | Status: COMPLETED | OUTPATIENT
Start: 2024-01-01 | End: 2024-01-01

## 2024-01-01 RX ORDER — APIXABAN 5 MG/1
5 TABLET, FILM COATED ORAL EVERY 12 HOURS
Refills: 0 | Status: DISCONTINUED | OUTPATIENT
Start: 2024-01-01 | End: 2024-01-01

## 2024-01-01 RX ORDER — DM/PSEUDOEPHED/ACETAMINOPHEN 10-30-250
50 CAPSULE ORAL
Refills: 0 | Status: DISCONTINUED | OUTPATIENT
Start: 2024-01-01 | End: 2024-01-01

## 2024-01-01 RX ORDER — ALPRAZOLAM 2 MG
0.25 TABLET ORAL ONCE
Refills: 0 | Status: DISCONTINUED | OUTPATIENT
Start: 2024-01-01 | End: 2024-01-01

## 2024-01-01 RX ORDER — MINOCYCLINE HCL 100 MG
CAPSULE ORAL
Refills: 0 | Status: DISCONTINUED | OUTPATIENT
Start: 2024-01-01 | End: 2025-01-01

## 2024-01-01 RX ORDER — FOLIC ACID 1 MG
1 TABLET ORAL DAILY
Refills: 0 | Status: DISCONTINUED | OUTPATIENT
Start: 2024-01-01 | End: 2024-01-01

## 2024-01-01 RX ORDER — POTASSIUM CHLORIDE 750 MG/1
10 TABLET, EXTENDED RELEASE ORAL ONCE
Refills: 0 | Status: COMPLETED | OUTPATIENT
Start: 2024-01-01 | End: 2024-01-01

## 2024-01-01 RX ORDER — INSULIN LISPRO 100/ML
VIAL (ML) SUBCUTANEOUS EVERY 6 HOURS
Refills: 0 | Status: DISCONTINUED | OUTPATIENT
Start: 2024-01-01 | End: 2025-01-01

## 2024-01-01 RX ORDER — PREDNISONE 5 MG/1
20 TABLET ORAL DAILY
Refills: 0 | Status: DISCONTINUED | OUTPATIENT
Start: 2024-01-01 | End: 2024-01-01

## 2024-01-01 RX ORDER — VANCOMYCIN HYDROCHLORIDE 50 MG/ML
1000 KIT ORAL ONCE
Refills: 0 | Status: COMPLETED | OUTPATIENT
Start: 2024-01-01 | End: 2024-01-01

## 2024-01-01 RX ORDER — URSODIOL 250 MG/1
300 TABLET, FILM COATED ORAL
Refills: 0 | Status: DISCONTINUED | OUTPATIENT
Start: 2024-01-01 | End: 2024-01-01

## 2024-01-01 RX ORDER — PHENYLEPHRINE HYDROCHLORIDE 10 MG/ML
0.1 INJECTION INTRAVENOUS
Qty: 160 | Refills: 0 | Status: DISCONTINUED | OUTPATIENT
Start: 2024-01-01 | End: 2024-01-01

## 2024-01-01 RX ORDER — SODIUM BICARBONATE 42 MG/ML
0.2 INJECTION, SOLUTION INTRAVENOUS
Qty: 150 | Refills: 0 | Status: DISCONTINUED | OUTPATIENT
Start: 2024-01-01 | End: 2024-01-01

## 2024-01-01 RX ORDER — NOREPINEPHRINE BITARTRATE 1 MG/ML
1.6 INJECTION, SOLUTION, CONCENTRATE INTRAVENOUS
Qty: 32 | Refills: 0 | Status: DISCONTINUED | OUTPATIENT
Start: 2024-01-01 | End: 2024-01-01

## 2024-01-01 RX ORDER — AMIODARONE HYDROCHLORIDE 50 MG/ML
0.5 INJECTION, SOLUTION INTRAVENOUS
Qty: 450 | Refills: 0 | Status: DISCONTINUED | OUTPATIENT
Start: 2024-01-01 | End: 2024-01-01

## 2024-01-01 RX ORDER — METHYLPREDNISOLONE ACETATE 40 MG/ML
60 VIAL (ML) INJECTION EVERY 24 HOURS
Refills: 0 | Status: COMPLETED | OUTPATIENT
Start: 2024-01-01 | End: 2024-01-01

## 2024-01-01 RX ORDER — AMIODARONE HYDROCHLORIDE 50 MG/ML
0.25 INJECTION, SOLUTION INTRAVENOUS
Qty: 450 | Refills: 0 | Status: COMPLETED | OUTPATIENT
Start: 2024-01-01 | End: 2024-01-01

## 2024-01-01 RX ORDER — ACETAMINOPHEN 160 MG/5ML
650 SUSPENSION ORAL ONCE
Refills: 0 | Status: COMPLETED | OUTPATIENT
Start: 2024-01-01 | End: 2024-01-01

## 2024-01-01 RX ORDER — SENNOSIDES 8.6 MG
2 TABLET ORAL AT BEDTIME
Refills: 0 | Status: DISCONTINUED | OUTPATIENT
Start: 2024-01-01 | End: 2024-01-01

## 2024-01-01 RX ORDER — METOPROLOL SUCCINATE 25 MG
25 TABLET, EXTENDED RELEASE 24 HR ORAL ONCE
Refills: 0 | Status: COMPLETED | OUTPATIENT
Start: 2024-01-01 | End: 2024-01-01

## 2024-01-01 RX ORDER — CYCLOSPORINE 100 MG/1
25 CAPSULE, GELATIN COATED ORAL
Refills: 0 | Status: DISCONTINUED | OUTPATIENT
Start: 2024-01-01 | End: 2025-01-01

## 2024-01-01 RX ORDER — METHYLPREDNISOLONE ACETATE 40 MG/ML
125 VIAL (ML) INJECTION EVERY 24 HOURS
Refills: 0 | Status: COMPLETED | OUTPATIENT
Start: 2024-01-01 | End: 2024-01-01

## 2024-01-01 RX ORDER — MIDODRINE HYDROCHLORIDE 5 MG/1
5 TABLET ORAL EVERY 8 HOURS
Refills: 0 | Status: DISCONTINUED | OUTPATIENT
Start: 2024-01-01 | End: 2024-01-01

## 2024-01-01 RX ORDER — ALBUMIN HUMAN 50 G/1000ML
100 SOLUTION INTRAVENOUS EVERY 6 HOURS
Refills: 0 | Status: DISCONTINUED | OUTPATIENT
Start: 2024-01-01 | End: 2024-01-01

## 2024-01-01 RX ORDER — ALBUMIN HUMAN 50 G/1000ML
50 SOLUTION INTRAVENOUS EVERY 6 HOURS
Refills: 0 | Status: DISCONTINUED | OUTPATIENT
Start: 2024-01-01 | End: 2024-01-01

## 2024-01-01 RX ORDER — AMIODARONE HYDROCHLORIDE 50 MG/ML
150 INJECTION, SOLUTION INTRAVENOUS ONCE
Refills: 0 | Status: COMPLETED | OUTPATIENT
Start: 2024-01-01 | End: 2024-01-01

## 2024-01-01 RX ORDER — DM/PSEUDOEPHED/ACETAMINOPHEN 10-30-250
15 CAPSULE ORAL ONCE
Refills: 0 | Status: DISCONTINUED | OUTPATIENT
Start: 2024-01-01 | End: 2024-01-01

## 2024-01-01 RX ORDER — ALBUTEROL 90 MCG
3 AEROSOL REFILL (GRAM) INHALATION
Refills: 0 | DISCHARGE

## 2024-01-01 RX ORDER — CASPOFUNGIN ACETATE 5 MG/ML
70 INJECTION, POWDER, LYOPHILIZED, FOR SOLUTION INTRAVENOUS ONCE
Refills: 0 | Status: COMPLETED | OUTPATIENT
Start: 2024-01-01 | End: 2024-01-01

## 2024-01-01 RX ORDER — DOBUTAMINE HCL 250MG/20ML
1 VIAL (ML) INTRAVENOUS
Qty: 500 | Refills: 0 | Status: DISCONTINUED | OUTPATIENT
Start: 2024-01-01 | End: 2024-01-01

## 2024-01-01 RX ORDER — SUCRALFATE 1 G/10ML
1 SUSPENSION ORAL ONCE
Refills: 0 | Status: COMPLETED | OUTPATIENT
Start: 2024-01-01 | End: 2024-01-01

## 2024-01-01 RX ORDER — LEVETIRACETAM 750 MG/1
250 TABLET, FILM COATED ORAL ONCE
Refills: 0 | Status: COMPLETED | OUTPATIENT
Start: 2024-01-01 | End: 2024-01-01

## 2024-01-01 RX ORDER — DM/PSEUDOEPHED/ACETAMINOPHEN 10-30-250
12.5 CAPSULE ORAL ONCE
Refills: 0 | Status: DISCONTINUED | OUTPATIENT
Start: 2024-01-01 | End: 2024-01-01

## 2024-01-01 RX ORDER — POLYETHYLENE GLYCOL 3350 17 G/17G
17 POWDER, FOR SOLUTION ORAL
Refills: 0 | Status: DISCONTINUED | OUTPATIENT
Start: 2024-01-01 | End: 2024-01-01

## 2024-01-01 RX ORDER — IPRATROPIUM BROMIDE AND ALBUTEROL SULFATE .5; 2.5 MG/3ML; MG/3ML
3 SOLUTION RESPIRATORY (INHALATION) ONCE
Refills: 0 | Status: DISCONTINUED | OUTPATIENT
Start: 2024-01-01 | End: 2024-01-01

## 2024-01-01 RX ORDER — AMPICILLIN SODIUM AND SULBACTAM SODIUM 2; 1 G/1; G/1
3 INJECTION, POWDER, FOR SOLUTION INTRAMUSCULAR; INTRAVENOUS EVERY 6 HOURS
Refills: 0 | Status: DISCONTINUED | OUTPATIENT
Start: 2024-01-01 | End: 2024-01-01

## 2024-01-01 RX ORDER — SULFAMETHOXAZOLE AND TRIMETHOPRIM 400; 80 MG/1; MG/1
160 TABLET ORAL DAILY
Refills: 0 | Status: DISCONTINUED | OUTPATIENT
Start: 2024-01-01 | End: 2025-01-01

## 2024-01-01 RX ORDER — AMPICILLIN SODIUM AND SULBACTAM SODIUM 2; 1 G/1; G/1
3 INJECTION, POWDER, FOR SOLUTION INTRAMUSCULAR; INTRAVENOUS EVERY 6 HOURS
Refills: 0 | Status: COMPLETED | OUTPATIENT
Start: 2024-01-01 | End: 2024-01-01

## 2024-01-01 RX ORDER — NIFEDIPINE 90 MG/1
60 TABLET, FILM COATED, EXTENDED RELEASE ORAL ONCE
Refills: 0 | Status: COMPLETED | OUTPATIENT
Start: 2024-01-01 | End: 2024-01-01

## 2024-01-01 RX ORDER — NEOSTIGMINE METHYLSULFATE 5 MG/5 ML
2 SYRINGE (ML) INTRAVENOUS ONCE
Refills: 0 | Status: COMPLETED | OUTPATIENT
Start: 2024-01-01 | End: 2024-01-01

## 2024-01-01 RX ORDER — FILGRASTIM-SNDZ 300 UG/.5ML
480 INJECTION, SOLUTION INTRAVENOUS; SUBCUTANEOUS ONCE
Refills: 0 | Status: COMPLETED | OUTPATIENT
Start: 2024-01-01 | End: 2024-01-01

## 2024-01-01 RX ORDER — MEROPENEM 500 MG/20ML
1000 INJECTION INTRAVENOUS EVERY 8 HOURS
Refills: 0 | Status: DISCONTINUED | OUTPATIENT
Start: 2024-01-01 | End: 2024-01-01

## 2024-01-01 RX ORDER — CYCLOSPORINE 100 MG/1
50 CAPSULE, GELATIN COATED ORAL
Refills: 0 | Status: DISCONTINUED | OUTPATIENT
Start: 2024-01-01 | End: 2025-01-01

## 2024-01-01 RX ORDER — PROPOFOL 10 MG/ML
10.01 INJECTION, EMULSION INTRAVENOUS
Qty: 1000 | Refills: 0 | Status: DISCONTINUED | OUTPATIENT
Start: 2024-01-01 | End: 2024-01-01

## 2024-01-01 RX ORDER — ALBUMIN HUMAN 50 G/1000ML
500 SOLUTION INTRAVENOUS ONCE
Refills: 0 | Status: COMPLETED | OUTPATIENT
Start: 2024-01-01 | End: 2024-01-01

## 2024-01-01 RX ORDER — MEROPENEM 500 MG/20ML
1000 INJECTION INTRAVENOUS EVERY 12 HOURS
Refills: 0 | Status: COMPLETED | OUTPATIENT
Start: 2024-01-01 | End: 2024-01-01

## 2024-01-01 RX ORDER — PHENYLEPHRINE HYDROCHLORIDE 10 MG/ML
4 INJECTION INTRAVENOUS
Qty: 160 | Refills: 0 | Status: DISCONTINUED | OUTPATIENT
Start: 2024-01-01 | End: 2024-01-01

## 2024-01-01 RX ORDER — LINEZOLID 600 MG/300ML
600 INJECTION, SOLUTION INTRAVENOUS ONCE
Refills: 0 | Status: COMPLETED | OUTPATIENT
Start: 2024-01-01 | End: 2024-01-01

## 2024-01-01 RX ORDER — FENTANYL CITRATE 50 UG/ML
25 INJECTION INTRAMUSCULAR; INTRAVENOUS EVERY 6 HOURS
Refills: 0 | Status: DISCONTINUED | OUTPATIENT
Start: 2024-01-01 | End: 2025-01-01

## 2024-01-01 RX ORDER — LINEZOLID 600 MG/300ML
INJECTION, SOLUTION INTRAVENOUS
Refills: 0 | Status: DISCONTINUED | OUTPATIENT
Start: 2024-01-01 | End: 2024-01-01

## 2024-01-01 RX ORDER — PHYTONADIONE 2 MG/ML
5 INJECTION, EMULSION INTRAMUSCULAR; INTRAVENOUS; SUBCUTANEOUS ONCE
Refills: 0 | Status: COMPLETED | OUTPATIENT
Start: 2024-01-01 | End: 2024-01-01

## 2024-01-01 RX ORDER — AMIODARONE HYDROCHLORIDE 50 MG/ML
1 INJECTION, SOLUTION INTRAVENOUS
Qty: 450 | Refills: 0 | Status: COMPLETED | OUTPATIENT
Start: 2024-01-01 | End: 2024-01-01

## 2024-01-01 RX ORDER — HEPARIN SODIUM,PORCINE 10000/ML
5000 VIAL (ML) INJECTION EVERY 12 HOURS
Refills: 0 | Status: DISCONTINUED | OUTPATIENT
Start: 2024-01-01 | End: 2024-01-01

## 2024-01-01 RX ORDER — LIDOCAINE HYDROCHLORIDE 30 MG/G
1 CREAM TOPICAL ONCE
Refills: 0 | Status: DISCONTINUED | OUTPATIENT
Start: 2024-01-01 | End: 2024-01-01

## 2024-01-01 RX ORDER — SODIUM PHOSPHATE, DIBASIC, ANHYDROUS, POTASSIUM PHOSPHATE, MONOBASIC, AND SODIUM PHOSPHATE, MONOBASIC, MONOHYDRATE 852; 155; 130 MG/1; MG/1; MG/1
1 TABLET, COATED ORAL ONCE
Refills: 0 | Status: COMPLETED | OUTPATIENT
Start: 2024-01-01 | End: 2024-01-01

## 2024-01-01 RX ORDER — CEFEPIME HCL 1 G
1000 IV SOLUTION, PIGGYBACK, BOTTLE (EA) INTRAVENOUS EVERY 8 HOURS
Refills: 0 | Status: DISCONTINUED | OUTPATIENT
Start: 2024-01-01 | End: 2024-01-01

## 2024-01-01 RX ORDER — OXYCODONE HYDROCHLORIDE 30 MG/1
10 TABLET ORAL EVERY 4 HOURS
Refills: 0 | Status: DISCONTINUED | OUTPATIENT
Start: 2024-01-01 | End: 2024-01-01

## 2024-01-01 RX ORDER — FLUCONAZOLE 100 MG/1
TABLET ORAL
Refills: 0 | Status: DISCONTINUED | OUTPATIENT
Start: 2024-01-01 | End: 2024-01-01

## 2024-01-01 RX ORDER — QUETIAPINE FUMARATE 300 MG/1
75 TABLET ORAL AT BEDTIME
Refills: 0 | Status: DISCONTINUED | OUTPATIENT
Start: 2024-01-01 | End: 2024-01-01

## 2024-01-01 RX ORDER — SODIUM CHLORIDE 9 G/ML
500 INJECTION, SOLUTION INTRAVENOUS ONCE
Refills: 0 | Status: COMPLETED | OUTPATIENT
Start: 2024-01-01 | End: 2024-01-01

## 2024-01-01 RX ORDER — DEXMEDETOMIDINE HYDROCHLORIDE 4 UG/ML
1 INJECTION, SOLUTION INTRAVENOUS
Qty: 200 | Refills: 0 | Status: DISCONTINUED | OUTPATIENT
Start: 2024-01-01 | End: 2024-01-01

## 2024-01-01 RX ORDER — EPINEPHRINE 5 MG/ML
0.1 VIAL (ML) INJECTION
Qty: 4 | Refills: 0 | Status: DISCONTINUED | OUTPATIENT
Start: 2024-01-01 | End: 2024-01-01

## 2024-01-01 RX ORDER — ONDANSETRON 4 MG/1
4 TABLET, ORALLY DISINTEGRATING ORAL ONCE
Refills: 0 | Status: COMPLETED | OUTPATIENT
Start: 2024-01-01 | End: 2024-01-01

## 2024-01-01 RX ORDER — ANTISEPTIC SURGICAL SCRUB 0.04 MG/ML
15 SOLUTION TOPICAL EVERY 12 HOURS
Refills: 0 | Status: DISCONTINUED | OUTPATIENT
Start: 2024-01-01 | End: 2025-01-01

## 2024-01-01 RX ORDER — THIAMINE HCL 100 MG
100 TABLET ORAL DAILY
Refills: 0 | Status: DISCONTINUED | OUTPATIENT
Start: 2024-01-01 | End: 2024-01-01

## 2024-01-01 RX ORDER — CALCIUM CARBONATE/VITAMIN D3 600 MG-10
1 TABLET ORAL EVERY 12 HOURS
Refills: 0 | Status: DISCONTINUED | OUTPATIENT
Start: 2024-01-01 | End: 2024-01-01

## 2024-01-01 RX ORDER — SULFAMETHOXAZOLE AND TRIMETHOPRIM 400; 80 MG/1; MG/1
160 TABLET ORAL DAILY
Refills: 0 | Status: DISCONTINUED | OUTPATIENT
Start: 2024-01-01 | End: 2024-01-01

## 2024-01-01 RX ORDER — LIDOCAINE HYDROCHLORIDE 30 MG/G
1 CREAM TOPICAL EVERY 24 HOURS
Refills: 0 | Status: DISCONTINUED | OUTPATIENT
Start: 2024-01-01 | End: 2024-01-01

## 2024-01-01 RX ORDER — BISACODYL 5 MG
10 TABLET, DELAYED RELEASE (ENTERIC COATED) ORAL ONCE
Refills: 0 | Status: COMPLETED | OUTPATIENT
Start: 2024-01-01 | End: 2024-01-01

## 2024-01-01 RX ORDER — ROCURONIUM BROMIDE 10 MG/ML
100 INJECTION INTRAVENOUS ONCE
Refills: 0 | Status: COMPLETED | OUTPATIENT
Start: 2024-01-01 | End: 2024-01-01

## 2024-01-01 RX ORDER — LIDOCAINE HYDROCHLORIDE 10 MG/ML
10 INJECTION EPIDURAL; INFILTRATION; INTRACAUDAL ONCE
Refills: 0 | Status: COMPLETED | OUTPATIENT
Start: 2024-01-01 | End: 2024-01-01

## 2024-01-01 RX ORDER — METHYLPREDNISOLONE ACETATE 40 MG/ML
1000 VIAL (ML) INJECTION ONCE
Refills: 0 | Status: DISCONTINUED | OUTPATIENT
Start: 2024-01-01 | End: 2024-01-01

## 2024-01-01 RX ORDER — DM/PSEUDOEPHED/ACETAMINOPHEN 10-30-250
50 CAPSULE ORAL ONCE
Refills: 0 | Status: COMPLETED | OUTPATIENT
Start: 2024-01-01 | End: 2024-01-01

## 2024-01-01 RX ORDER — CALCIUM CARBONATE/VITAMIN D3 600 MG-10
1 TABLET ORAL
Refills: 0 | Status: DISCONTINUED | OUTPATIENT
Start: 2024-01-01 | End: 2024-01-01

## 2024-01-01 RX ORDER — LEVETIRACETAM 750 MG/1
500 TABLET, FILM COATED ORAL
Refills: 0 | Status: DISCONTINUED | OUTPATIENT
Start: 2024-01-01 | End: 2024-01-01

## 2024-01-01 RX ORDER — MYCOPHENOLATE MOFETIL 200 MG/ML
250 POWDER, FOR SUSPENSION ORAL ONCE
Refills: 0 | Status: COMPLETED | OUTPATIENT
Start: 2024-01-01 | End: 2024-01-01

## 2024-01-01 RX ORDER — HEPARIN SODIUM,PORCINE 10000/ML
5000 VIAL (ML) INJECTION EVERY 8 HOURS
Refills: 0 | Status: DISCONTINUED | OUTPATIENT
Start: 2024-01-01 | End: 2024-01-01

## 2024-01-01 RX ORDER — MEROPENEM 500 MG/20ML
1 INJECTION INTRAVENOUS EVERY 8 HOURS
Refills: 0 | Status: DISCONTINUED | OUTPATIENT
Start: 2024-01-01 | End: 2024-01-01

## 2024-01-01 RX ORDER — NOREPINEPHRINE BITARTRATE 1 MG/ML
1.1 INJECTION, SOLUTION, CONCENTRATE INTRAVENOUS
Qty: 32 | Refills: 0 | Status: DISCONTINUED | OUTPATIENT
Start: 2024-01-01 | End: 2024-01-01

## 2024-01-01 RX ORDER — IPRATROPIUM BROMIDE AND ALBUTEROL SULFATE .5; 2.5 MG/3ML; MG/3ML
3 SOLUTION RESPIRATORY (INHALATION) EVERY 6 HOURS
Refills: 0 | Status: COMPLETED | OUTPATIENT
Start: 2024-01-01 | End: 2024-01-01

## 2024-01-01 RX ORDER — INSULIN LISPRO 100/ML
6 VIAL (ML) SUBCUTANEOUS
Refills: 0 | Status: DISCONTINUED | OUTPATIENT
Start: 2024-01-01 | End: 2024-01-01

## 2024-01-01 RX ORDER — DILTIAZEM HYDROCHLORIDE 60 MG/1
10 TABLET ORAL
Qty: 125 | Refills: 0 | Status: DISCONTINUED | OUTPATIENT
Start: 2024-01-01 | End: 2024-01-01

## 2024-01-01 RX ORDER — FLUCONAZOLE 100 MG/1
400 TABLET ORAL EVERY 24 HOURS
Refills: 0 | Status: DISCONTINUED | OUTPATIENT
Start: 2024-01-01 | End: 2024-01-01

## 2024-01-01 RX ORDER — MINOCYCLINE HCL 100 MG
200 CAPSULE ORAL EVERY 12 HOURS
Refills: 0 | Status: DISCONTINUED | OUTPATIENT
Start: 2024-01-01 | End: 2025-01-01

## 2024-01-01 RX ORDER — TACROLIMUS 1 MG/1
0.5 CAPSULE, GELATIN COATED ORAL ONCE
Refills: 0 | Status: COMPLETED | OUTPATIENT
Start: 2024-01-01 | End: 2024-01-01

## 2024-01-01 RX ORDER — ACETAMINOPHEN 160 MG/5ML
500 SUSPENSION ORAL EVERY 6 HOURS
Refills: 0 | Status: DISCONTINUED | OUTPATIENT
Start: 2024-01-01 | End: 2024-01-01

## 2024-01-01 RX ORDER — INSULIN GLARGINE-YFGN 100 [IU]/ML
11 INJECTION, SOLUTION SUBCUTANEOUS AT BEDTIME
Refills: 0 | Status: DISCONTINUED | OUTPATIENT
Start: 2024-01-01 | End: 2024-01-01

## 2024-01-01 RX ORDER — DEXMEDETOMIDINE HYDROCHLORIDE 4 UG/ML
0.03 INJECTION, SOLUTION INTRAVENOUS
Qty: 200 | Refills: 0 | Status: DISCONTINUED | OUTPATIENT
Start: 2024-01-01 | End: 2024-01-01

## 2024-01-01 RX ORDER — PIPERACILLIN SODIUM AND TAZOBACTAM SODIUM 2; 250 G/50ML; MG/50ML
3.38 INJECTION, POWDER, FOR SOLUTION INTRAVENOUS EVERY 8 HOURS
Refills: 0 | Status: DISCONTINUED | OUTPATIENT
Start: 2024-01-01 | End: 2024-01-01

## 2024-01-01 RX ORDER — ALBUMIN HUMAN 50 G/1000ML
100 SOLUTION INTRAVENOUS EVERY 6 HOURS
Refills: 0 | Status: COMPLETED | OUTPATIENT
Start: 2024-01-01 | End: 2024-01-01

## 2024-01-01 RX ORDER — INSULIN NPH HUMAN ISOPHANE 100/ML (3)
5 INSULIN PEN (ML) SUBCUTANEOUS EVERY 6 HOURS
Refills: 0 | Status: DISCONTINUED | OUTPATIENT
Start: 2024-01-01 | End: 2024-01-01

## 2024-01-01 RX ORDER — NYSTATIN 100000 U/G
1 POWDER TOPICAL EVERY 12 HOURS
Refills: 0 | Status: DISCONTINUED | OUTPATIENT
Start: 2024-01-01 | End: 2025-01-01

## 2024-01-01 RX ORDER — DIPHENHYDRAMINE HYDROCHLORIDE AND LIDOCAINE HYDROCHLORIDE AND ALUMINUM HYDROXIDE AND MAGNESIUM HYDRO
10 KIT EVERY 8 HOURS
Refills: 0 | Status: DISCONTINUED | OUTPATIENT
Start: 2024-01-01 | End: 2025-01-01

## 2024-01-01 RX ORDER — HYDROMORPHONE HYDROCHLORIDE 4 MG/ML
1 INJECTION, SOLUTION INTRAMUSCULAR; INTRAVENOUS; SUBCUTANEOUS
Refills: 0 | Status: DISCONTINUED | OUTPATIENT
Start: 2024-01-01 | End: 2024-01-01

## 2024-01-01 RX ORDER — INSULIN NPH HUMAN ISOPHANE 100/ML (3)
6 INSULIN PEN (ML) SUBCUTANEOUS EVERY 6 HOURS
Refills: 0 | Status: DISCONTINUED | OUTPATIENT
Start: 2024-01-01 | End: 2024-01-01

## 2024-01-01 RX ORDER — HEPARIN SODIUM,PORCINE 10000/ML
1400 VIAL (ML) INJECTION
Qty: 25000 | Refills: 0 | Status: DISCONTINUED | OUTPATIENT
Start: 2024-01-01 | End: 2024-01-01

## 2024-01-01 RX ORDER — ACETAMINOPHEN 160 MG/5ML
500 SUSPENSION ORAL ONCE
Refills: 0 | Status: COMPLETED | OUTPATIENT
Start: 2024-01-01 | End: 2024-01-01

## 2024-01-01 RX ORDER — AMIODARONE HYDROCHLORIDE 50 MG/ML
0.25 INJECTION, SOLUTION INTRAVENOUS
Qty: 450 | Refills: 0 | Status: DISCONTINUED | OUTPATIENT
Start: 2024-01-01 | End: 2024-01-01

## 2024-01-01 RX ORDER — CALCIUM CHLORIDE 100 MG/ML
1000 INJECTION INTRAVENOUS; INTRAVENTRICULAR ONCE
Refills: 0 | Status: COMPLETED | OUTPATIENT
Start: 2024-01-01 | End: 2024-01-01

## 2024-01-01 RX ORDER — FLUCONAZOLE 100 MG/1
800 TABLET ORAL ONCE
Refills: 0 | Status: DISCONTINUED | OUTPATIENT
Start: 2024-01-01 | End: 2024-01-01

## 2024-01-01 RX ORDER — AMINO ACIDS 5 %/LYTES/DEX 25 % 5 %
500 INTRAVENOUS SOLUTION INTRAVENOUS
Refills: 0 | Status: DISCONTINUED | OUTPATIENT
Start: 2024-01-01 | End: 2024-01-01

## 2024-01-01 RX ORDER — CYCLOSPORINE 100 MG/1
75 CAPSULE, GELATIN COATED ORAL ONCE
Refills: 0 | Status: COMPLETED | OUTPATIENT
Start: 2024-01-01 | End: 2024-01-01

## 2024-01-01 RX ORDER — ACETAMINOPHEN 160 MG/5ML
1000 SUSPENSION ORAL ONCE
Refills: 0 | Status: DISCONTINUED | OUTPATIENT
Start: 2024-01-01 | End: 2024-01-01

## 2024-01-01 RX ORDER — PROPOFOL 10 MG/ML
30 INJECTION, EMULSION INTRAVENOUS
Qty: 1000 | Refills: 0 | Status: DISCONTINUED | OUTPATIENT
Start: 2024-01-01 | End: 2024-01-01

## 2024-01-01 RX ORDER — INSULIN LISPRO 100/ML
10 VIAL (ML) SUBCUTANEOUS
Refills: 0 | Status: DISCONTINUED | OUTPATIENT
Start: 2024-01-01 | End: 2024-01-01

## 2024-01-01 RX ORDER — CASPOFUNGIN ACETATE 5 MG/ML
50 INJECTION, POWDER, LYOPHILIZED, FOR SOLUTION INTRAVENOUS EVERY 24 HOURS
Refills: 0 | Status: DISCONTINUED | OUTPATIENT
Start: 2024-01-01 | End: 2024-01-01

## 2024-01-01 RX ORDER — SULFAMETHOXAZOLE AND TRIMETHOPRIM 400; 80 MG/1; MG/1
80 TABLET ORAL DAILY
Refills: 0 | Status: DISCONTINUED | OUTPATIENT
Start: 2024-01-01 | End: 2024-01-01

## 2024-01-01 RX ORDER — PROPOFOL 10 MG/ML
10 INJECTION, EMULSION INTRAVENOUS
Qty: 1000 | Refills: 0 | Status: DISCONTINUED | OUTPATIENT
Start: 2024-01-01 | End: 2024-01-01

## 2024-01-01 RX ORDER — INSULIN NPH HUMAN ISOPHANE 100/ML (3)
10 INSULIN PEN (ML) SUBCUTANEOUS EVERY 6 HOURS
Refills: 0 | Status: DISCONTINUED | OUTPATIENT
Start: 2024-01-01 | End: 2024-01-01

## 2024-01-01 RX ORDER — PROPOFOL 10 MG/ML
5 INJECTION, EMULSION INTRAVENOUS
Qty: 1000 | Refills: 0 | Status: DISCONTINUED | OUTPATIENT
Start: 2024-01-01 | End: 2024-01-01

## 2024-01-01 RX ORDER — INSULIN LISPRO 100/ML
VIAL (ML) SUBCUTANEOUS
Refills: 0 | Status: DISCONTINUED | OUTPATIENT
Start: 2024-01-01 | End: 2024-01-01

## 2024-01-01 RX ORDER — BASILIXIMAB 20 MG/5ML
20 INJECTION, POWDER, FOR SOLUTION INTRAVENOUS
Refills: 0 | Status: DISCONTINUED | OUTPATIENT
Start: 2024-01-01 | End: 2024-01-01

## 2024-01-01 RX ORDER — ALBUMIN HUMAN 50 G/1000ML
250 SOLUTION INTRAVENOUS ONCE
Refills: 0 | Status: DISCONTINUED | OUTPATIENT
Start: 2024-01-01 | End: 2024-01-01

## 2024-01-01 RX ORDER — METHYLPREDNISOLONE ACETATE 40 MG/ML
16 VIAL (ML) INJECTION DAILY
Refills: 0 | Status: DISCONTINUED | OUTPATIENT
Start: 2024-01-01 | End: 2024-01-01

## 2024-01-01 RX ORDER — METHYLPREDNISOLONE ACETATE 40 MG/ML
VIAL (ML) INJECTION
Refills: 0 | Status: COMPLETED | OUTPATIENT
Start: 2024-01-01 | End: 2024-01-01

## 2024-01-01 RX ORDER — METHYLNALTREXONE BROMIDE 12 MG/.6ML
12 INJECTION, SOLUTION SUBCUTANEOUS EVERY OTHER DAY
Refills: 0 | Status: DISCONTINUED | OUTPATIENT
Start: 2024-01-01 | End: 2024-01-01

## 2024-01-01 RX ORDER — OXYCODONE HYDROCHLORIDE 30 MG/1
10 TABLET ORAL ONCE
Refills: 0 | Status: DISCONTINUED | OUTPATIENT
Start: 2024-01-01 | End: 2024-01-01

## 2024-01-01 RX ORDER — CASPOFUNGIN ACETATE 5 MG/ML
INJECTION, POWDER, LYOPHILIZED, FOR SOLUTION INTRAVENOUS
Refills: 0 | Status: DISCONTINUED | OUTPATIENT
Start: 2024-01-01 | End: 2025-01-01

## 2024-01-01 RX ORDER — QUETIAPINE FUMARATE 300 MG/1
25 TABLET ORAL
Refills: 0 | Status: DISCONTINUED | OUTPATIENT
Start: 2024-01-01 | End: 2024-01-01

## 2024-01-01 RX ORDER — ESCITALOPRAM 10 MG/1
1 TABLET, FILM COATED ORAL
Refills: 0 | DISCHARGE

## 2024-01-01 RX ORDER — PANTOPRAZOLE 20 MG/1
8 TABLET, DELAYED RELEASE ORAL
Qty: 80 | Refills: 0 | Status: DISCONTINUED | OUTPATIENT
Start: 2024-01-01 | End: 2024-01-01

## 2024-01-01 RX ORDER — NALOXEGOL OXALATE 12.5 MG/1
12.5 TABLET, FILM COATED ORAL
Refills: 0 | Status: DISCONTINUED | OUTPATIENT
Start: 2024-01-01 | End: 2024-01-01

## 2024-01-01 RX ORDER — FLUDROCORTISONE ACETATE 0.1 MG/1
0.1 TABLET ORAL DAILY
Refills: 0 | Status: DISCONTINUED | OUTPATIENT
Start: 2024-01-01 | End: 2024-01-01

## 2024-01-01 RX ORDER — METOPROLOL SUCCINATE 25 MG
25 TABLET, EXTENDED RELEASE 24 HR ORAL EVERY 12 HOURS
Refills: 0 | Status: DISCONTINUED | OUTPATIENT
Start: 2024-01-01 | End: 2024-01-01

## 2024-01-01 RX ORDER — METOPROLOL SUCCINATE 25 MG
50 TABLET, EXTENDED RELEASE 24 HR ORAL ONCE
Refills: 0 | Status: DISCONTINUED | OUTPATIENT
Start: 2024-01-01 | End: 2024-01-01

## 2024-01-01 RX ORDER — FENTANYL CITRATE 50 UG/ML
1 INJECTION INTRAMUSCULAR; INTRAVENOUS
Refills: 0 | Status: DISCONTINUED | OUTPATIENT
Start: 2024-01-01 | End: 2024-01-01

## 2024-01-01 RX ORDER — BUDESONIDE 9 MG/1
2 TABLET, EXTENDED RELEASE ORAL
Refills: 0 | DISCHARGE

## 2024-01-01 RX ORDER — METOPROLOL SUCCINATE 25 MG
12.5 TABLET, EXTENDED RELEASE 24 HR ORAL ONCE
Refills: 0 | Status: COMPLETED | OUTPATIENT
Start: 2024-01-01 | End: 2024-01-01

## 2024-01-01 RX ORDER — LEVETIRACETAM 750 MG/1
500 TABLET, FILM COATED ORAL EVERY 12 HOURS
Refills: 0 | Status: DISCONTINUED | OUTPATIENT
Start: 2024-01-01 | End: 2024-01-01

## 2024-01-01 RX ORDER — QUETIAPINE FUMARATE 300 MG/1
12.5 TABLET ORAL ONCE
Refills: 0 | Status: DISCONTINUED | OUTPATIENT
Start: 2024-01-01 | End: 2024-01-01

## 2024-01-01 RX ORDER — METOPROLOL SUCCINATE 25 MG
25 TABLET, EXTENDED RELEASE 24 HR ORAL EVERY 8 HOURS
Refills: 0 | Status: DISCONTINUED | OUTPATIENT
Start: 2024-01-01 | End: 2024-01-01

## 2024-01-01 RX ORDER — ESCITALOPRAM 10 MG/1
20 TABLET, FILM COATED ORAL AT BEDTIME
Refills: 0 | Status: DISCONTINUED | OUTPATIENT
Start: 2024-01-01 | End: 2024-01-01

## 2024-01-01 RX ORDER — FENTANYL CITRATE 50 UG/ML
100 INJECTION INTRAMUSCULAR; INTRAVENOUS ONCE
Refills: 0 | Status: DISCONTINUED | OUTPATIENT
Start: 2024-01-01 | End: 2024-01-01

## 2024-01-01 RX ORDER — ALBUMIN HUMAN 50 G/1000ML
250 SOLUTION INTRAVENOUS EVERY 8 HOURS
Refills: 0 | Status: COMPLETED | OUTPATIENT
Start: 2024-01-01 | End: 2024-01-01

## 2024-01-01 RX ORDER — HYDROMORPHONE HYDROCHLORIDE 4 MG/ML
0.5 INJECTION, SOLUTION INTRAMUSCULAR; INTRAVENOUS; SUBCUTANEOUS EVERY 4 HOURS
Refills: 0 | Status: DISCONTINUED | OUTPATIENT
Start: 2024-01-01 | End: 2024-01-01

## 2024-01-01 RX ORDER — PHYTONADIONE 2 MG/ML
10 INJECTION, EMULSION INTRAMUSCULAR; INTRAVENOUS; SUBCUTANEOUS DAILY
Refills: 0 | Status: COMPLETED | OUTPATIENT
Start: 2024-01-01 | End: 2024-01-01

## 2024-01-01 RX ORDER — DOXAZOSIN MESYLATE 4 MG
2 TABLET ORAL AT BEDTIME
Refills: 0 | Status: DISCONTINUED | OUTPATIENT
Start: 2024-01-01 | End: 2024-01-01

## 2024-01-01 RX ORDER — DEXMEDETOMIDINE HYDROCHLORIDE 4 UG/ML
0.5 INJECTION, SOLUTION INTRAVENOUS
Qty: 200 | Refills: 0 | Status: DISCONTINUED | OUTPATIENT
Start: 2024-01-01 | End: 2024-01-01

## 2024-01-01 RX ORDER — METRONIDAZOLE 500 MG
500 TABLET ORAL EVERY 8 HOURS
Refills: 0 | Status: DISCONTINUED | OUTPATIENT
Start: 2024-01-01 | End: 2025-01-01

## 2024-01-01 RX ORDER — BACTERIOSTATIC SODIUM CHLORIDE 0.9 %
500 VIAL (ML) INJECTION ONCE
Refills: 0 | Status: COMPLETED | OUTPATIENT
Start: 2024-01-01 | End: 2024-01-01

## 2024-01-01 RX ORDER — PANTOPRAZOLE 20 MG/1
40 TABLET, DELAYED RELEASE ORAL
Refills: 0 | Status: DISCONTINUED | OUTPATIENT
Start: 2024-01-01 | End: 2024-01-01

## 2024-01-01 RX ORDER — METHYLPREDNISOLONE ACETATE 40 MG/ML
40 VIAL (ML) INJECTION EVERY 24 HOURS
Refills: 0 | Status: COMPLETED | OUTPATIENT
Start: 2024-01-01 | End: 2024-01-01

## 2024-01-01 RX ORDER — MIRTAZAPINE 30 MG/1
7.5 TABLET, FILM COATED ORAL AT BEDTIME
Refills: 0 | Status: DISCONTINUED | OUTPATIENT
Start: 2024-01-01 | End: 2024-01-01

## 2024-01-01 RX ORDER — ALPRAZOLAM 2 MG
2 TABLET ORAL AT BEDTIME
Refills: 0 | Status: DISCONTINUED | OUTPATIENT
Start: 2024-01-01 | End: 2024-01-01

## 2024-01-01 RX ORDER — INSULIN GLARGINE-YFGN 100 [IU]/ML
3 INJECTION, SOLUTION SUBCUTANEOUS ONCE
Refills: 0 | Status: COMPLETED | OUTPATIENT
Start: 2024-01-01 | End: 2024-01-01

## 2024-01-01 RX ORDER — METOPROLOL SUCCINATE 25 MG
100 TABLET, EXTENDED RELEASE 24 HR ORAL
Refills: 0 | Status: DISCONTINUED | OUTPATIENT
Start: 2024-01-01 | End: 2024-01-01

## 2024-01-01 RX ORDER — ALBUMIN HUMAN 50 G/1000ML
100 SOLUTION INTRAVENOUS
Refills: 0 | Status: COMPLETED | OUTPATIENT
Start: 2024-01-01 | End: 2024-01-01

## 2024-01-01 RX ORDER — MYCOPHENOLATE MOFETIL 200 MG/ML
1000 POWDER, FOR SUSPENSION ORAL EVERY 12 HOURS
Refills: 0 | Status: DISCONTINUED | OUTPATIENT
Start: 2024-01-01 | End: 2024-01-01

## 2024-01-01 RX ORDER — ANTISEPTIC SURGICAL SCRUB 0.04 MG/ML
1 SOLUTION TOPICAL
Refills: 0 | Status: DISCONTINUED | OUTPATIENT
Start: 2024-01-01 | End: 2025-01-01

## 2024-01-01 RX ORDER — CASPOFUNGIN ACETATE 5 MG/ML
50 INJECTION, POWDER, LYOPHILIZED, FOR SOLUTION INTRAVENOUS EVERY 24 HOURS
Refills: 0 | Status: DISCONTINUED | OUTPATIENT
Start: 2024-01-01 | End: 2025-01-01

## 2024-01-01 RX ORDER — SULFAMETHOXAZOLE AND TRIMETHOPRIM 400; 80 MG/1; MG/1
390 TABLET ORAL EVERY 12 HOURS
Refills: 0 | Status: DISCONTINUED | OUTPATIENT
Start: 2024-01-01 | End: 2024-01-01

## 2024-01-01 RX ORDER — NOREPINEPHRINE BITARTRATE 1 MG/ML
1 INJECTION, SOLUTION, CONCENTRATE INTRAVENOUS
Qty: 32 | Refills: 0 | Status: DISCONTINUED | OUTPATIENT
Start: 2024-01-01 | End: 2024-01-01

## 2024-01-01 RX ORDER — DILTIAZEM HYDROCHLORIDE 60 MG/1
15 TABLET ORAL ONCE
Refills: 0 | Status: COMPLETED | OUTPATIENT
Start: 2024-01-01 | End: 2024-01-01

## 2024-01-01 RX ORDER — NYSTATIN 100000 U/G
1 POWDER TOPICAL THREE TIMES A DAY
Refills: 0 | Status: DISCONTINUED | OUTPATIENT
Start: 2024-01-01 | End: 2024-01-01

## 2024-01-01 RX ORDER — OXYCODONE HYDROCHLORIDE 30 MG/1
5 TABLET ORAL EVERY 4 HOURS
Refills: 0 | Status: DISCONTINUED | OUTPATIENT
Start: 2024-01-01 | End: 2025-01-01

## 2024-01-01 RX ORDER — METRONIDAZOLE 500 MG
500 TABLET ORAL EVERY 12 HOURS
Refills: 0 | Status: COMPLETED | OUTPATIENT
Start: 2024-01-01 | End: 2024-01-01

## 2024-01-01 RX ORDER — METOPROLOL SUCCINATE 25 MG
50 TABLET, EXTENDED RELEASE 24 HR ORAL EVERY 12 HOURS
Refills: 0 | Status: DISCONTINUED | OUTPATIENT
Start: 2024-01-01 | End: 2024-01-01

## 2024-01-01 RX ORDER — INSULIN GLARGINE-YFGN 100 [IU]/ML
6 INJECTION, SOLUTION SUBCUTANEOUS AT BEDTIME
Refills: 0 | Status: DISCONTINUED | OUTPATIENT
Start: 2024-01-01 | End: 2024-01-01

## 2024-01-01 RX ORDER — NYSTATIN 100000 U/G
1 POWDER TOPICAL
Refills: 0 | Status: DISCONTINUED | OUTPATIENT
Start: 2024-01-01 | End: 2024-01-01

## 2024-01-01 RX ORDER — METOPROLOL SUCCINATE 25 MG
12.5 TABLET, EXTENDED RELEASE 24 HR ORAL EVERY 8 HOURS
Refills: 0 | Status: DISCONTINUED | OUTPATIENT
Start: 2024-01-01 | End: 2024-01-01

## 2024-01-01 RX ORDER — TACROLIMUS 1 MG/1
0.5 CAPSULE, GELATIN COATED ORAL
Refills: 0 | Status: DISCONTINUED | OUTPATIENT
Start: 2024-01-01 | End: 2024-01-01

## 2024-01-01 RX ORDER — NOREPINEPHRINE BITARTRATE 1 MG/ML
0.07 INJECTION, SOLUTION, CONCENTRATE INTRAVENOUS
Qty: 8 | Refills: 0 | Status: DISCONTINUED | OUTPATIENT
Start: 2024-01-01 | End: 2024-01-01

## 2024-01-01 RX ORDER — MEPERIDINE HCL 100 MG
25 TABLET ORAL ONCE
Refills: 0 | Status: DISCONTINUED | OUTPATIENT
Start: 2024-01-01 | End: 2024-01-01

## 2024-01-01 RX ORDER — BACTERIOSTATIC SODIUM CHLORIDE 0.9 %
4 VIAL (ML) INJECTION ONCE
Refills: 0 | Status: COMPLETED | OUTPATIENT
Start: 2024-01-01 | End: 2024-01-01

## 2024-01-01 RX ORDER — QUETIAPINE FUMARATE 300 MG/1
12.5 TABLET ORAL
Refills: 0 | Status: DISCONTINUED | OUTPATIENT
Start: 2024-01-01 | End: 2024-01-01

## 2024-01-01 RX ORDER — MINOCYCLINE HCL 100 MG
200 CAPSULE ORAL ONCE
Refills: 0 | Status: COMPLETED | OUTPATIENT
Start: 2024-01-01 | End: 2024-01-01

## 2024-01-01 RX ORDER — DOBUTAMINE HCL 250MG/20ML
2 VIAL (ML) INTRAVENOUS
Qty: 500 | Refills: 0 | Status: DISCONTINUED | OUTPATIENT
Start: 2024-01-01 | End: 2024-01-01

## 2024-01-01 RX ORDER — MIDODRINE HYDROCHLORIDE 5 MG/1
5 TABLET ORAL ONCE
Refills: 0 | Status: DISCONTINUED | OUTPATIENT
Start: 2024-01-01 | End: 2024-01-01

## 2024-01-01 RX ORDER — DEXMEDETOMIDINE HYDROCHLORIDE 4 UG/ML
0.2 INJECTION, SOLUTION INTRAVENOUS
Qty: 200 | Refills: 0 | Status: DISCONTINUED | OUTPATIENT
Start: 2024-01-01 | End: 2024-01-01

## 2024-01-01 RX ORDER — DESMOPRESSIN ACETATE 4 UG/ML
30 INJECTION, SOLUTION INTRAVENOUS; SUBCUTANEOUS ONCE
Refills: 0 | Status: COMPLETED | OUTPATIENT
Start: 2024-01-01 | End: 2024-01-01

## 2024-01-01 RX ORDER — CYCLOSPORINE 100 MG/1
33 CAPSULE, GELATIN COATED ORAL EVERY 12 HOURS
Refills: 0 | Status: DISCONTINUED | OUTPATIENT
Start: 2024-01-01 | End: 2024-01-01

## 2024-01-01 RX ORDER — METHYLPREDNISOLONE ACETATE 40 MG/ML
20 VIAL (ML) INJECTION EVERY 24 HOURS
Refills: 0 | Status: COMPLETED | OUTPATIENT
Start: 2024-01-01 | End: 2024-01-01

## 2024-01-01 RX ORDER — DEXMEDETOMIDINE HYDROCHLORIDE 4 UG/ML
1.5 INJECTION, SOLUTION INTRAVENOUS
Qty: 200 | Refills: 0 | Status: DISCONTINUED | OUTPATIENT
Start: 2024-01-01 | End: 2024-01-01

## 2024-01-01 RX ORDER — SULFAMETHOXAZOLE AND TRIMETHOPRIM 400; 80 MG/1; MG/1
380 TABLET ORAL EVERY 12 HOURS
Refills: 0 | Status: DISCONTINUED | OUTPATIENT
Start: 2024-01-01 | End: 2024-01-01

## 2024-01-01 RX ORDER — SENNOSIDES 8.6 MG
2 TABLET ORAL
Refills: 0 | Status: DISCONTINUED | OUTPATIENT
Start: 2024-01-01 | End: 2024-01-01

## 2024-01-01 RX ORDER — ASPIRIN 81 MG/1
300 TABLET, COATED ORAL DAILY
Refills: 0 | Status: DISCONTINUED | OUTPATIENT
Start: 2024-01-01 | End: 2024-01-01

## 2024-01-01 RX ORDER — ALBUMIN HUMAN 50 G/1000ML
500 SOLUTION INTRAVENOUS ONCE
Refills: 0 | Status: DISCONTINUED | OUTPATIENT
Start: 2024-01-01 | End: 2024-01-01

## 2024-01-01 RX ORDER — ARIPIPRAZOLE 5 MG/1
1 TABLET ORAL
Refills: 0 | DISCHARGE

## 2024-01-01 RX ORDER — CYCLOSPORINE 100 MG/1
25 CAPSULE, GELATIN COATED ORAL
Refills: 0 | Status: COMPLETED | OUTPATIENT
Start: 2024-01-01 | End: 2024-01-01

## 2024-01-01 RX ORDER — ANTISEPTIC SURGICAL SCRUB 0.04 MG/ML
1 SOLUTION TOPICAL
Refills: 0 | Status: DISCONTINUED | OUTPATIENT
Start: 2024-01-01 | End: 2024-01-01

## 2024-01-01 RX ORDER — ALPRAZOLAM 2 MG
0.5 TABLET ORAL ONCE
Refills: 0 | Status: DISCONTINUED | OUTPATIENT
Start: 2024-01-01 | End: 2024-01-01

## 2024-01-01 RX ORDER — BUPROPION HYDROCHLORIDE 150 MG/1
1 TABLET, EXTENDED RELEASE ORAL
Refills: 0 | DISCHARGE

## 2024-01-01 RX ORDER — INSULIN NPH HUMAN ISOPHANE 100/ML (3)
7 INSULIN PEN (ML) SUBCUTANEOUS EVERY 6 HOURS
Refills: 0 | Status: DISCONTINUED | OUTPATIENT
Start: 2024-01-01 | End: 2024-01-01

## 2024-01-01 RX ORDER — HYDROMORPHONE HYDROCHLORIDE 4 MG/ML
0.2 INJECTION, SOLUTION INTRAMUSCULAR; INTRAVENOUS; SUBCUTANEOUS ONCE
Refills: 0 | Status: DISCONTINUED | OUTPATIENT
Start: 2024-01-01 | End: 2024-01-01

## 2024-01-01 RX ORDER — ALBUMIN HUMAN 50 G/1000ML
50 SOLUTION INTRAVENOUS EVERY 6 HOURS
Refills: 0 | Status: COMPLETED | OUTPATIENT
Start: 2024-01-01 | End: 2024-01-01

## 2024-01-01 RX ORDER — INSULIN NPH HUMAN ISOPHANE 100/ML (3)
15 INSULIN PEN (ML) SUBCUTANEOUS ONCE
Refills: 0 | Status: COMPLETED | OUTPATIENT
Start: 2024-01-01 | End: 2024-01-01

## 2024-01-01 RX ORDER — METOPROLOL SUCCINATE 25 MG
5 TABLET, EXTENDED RELEASE 24 HR ORAL EVERY 6 HOURS
Refills: 0 | Status: DISCONTINUED | OUTPATIENT
Start: 2024-01-01 | End: 2024-01-01

## 2024-01-01 RX ORDER — PHYTONADIONE 2 MG/ML
10 INJECTION, EMULSION INTRAMUSCULAR; INTRAVENOUS; SUBCUTANEOUS ONCE
Refills: 0 | Status: DISCONTINUED | OUTPATIENT
Start: 2024-01-01 | End: 2024-01-01

## 2024-01-01 RX ORDER — NOREPINEPHRINE BITARTRATE 1 MG/ML
0.03 INJECTION, SOLUTION, CONCENTRATE INTRAVENOUS
Qty: 8 | Refills: 0 | Status: DISCONTINUED | OUTPATIENT
Start: 2024-01-01 | End: 2024-01-01

## 2024-01-01 RX ORDER — METOPROLOL SUCCINATE 25 MG
50 TABLET, EXTENDED RELEASE 24 HR ORAL
Refills: 0 | Status: DISCONTINUED | OUTPATIENT
Start: 2024-01-01 | End: 2024-01-01

## 2024-01-01 RX ORDER — PHENYLEPHRINE HYDROCHLORIDE 10 MG/ML
0.1 INJECTION INTRAVENOUS
Qty: 40 | Refills: 0 | Status: DISCONTINUED | OUTPATIENT
Start: 2024-01-01 | End: 2025-01-01

## 2024-01-01 RX ORDER — OXYCODONE HYDROCHLORIDE 30 MG/1
2.5 TABLET ORAL EVERY 4 HOURS
Refills: 0 | Status: DISCONTINUED | OUTPATIENT
Start: 2024-01-01 | End: 2025-01-01

## 2024-01-01 RX ORDER — FLUCONAZOLE 100 MG/1
200 TABLET ORAL EVERY 24 HOURS
Refills: 0 | Status: DISCONTINUED | OUTPATIENT
Start: 2024-01-01 | End: 2024-01-01

## 2024-01-01 RX ORDER — SENNOSIDES 8.6 MG
10 TABLET ORAL EVERY 24 HOURS
Refills: 0 | Status: DISCONTINUED | OUTPATIENT
Start: 2024-01-01 | End: 2024-01-01

## 2024-01-01 RX ORDER — QUETIAPINE FUMARATE 300 MG/1
25 TABLET ORAL AT BEDTIME
Refills: 0 | Status: DISCONTINUED | OUTPATIENT
Start: 2024-01-01 | End: 2024-01-01

## 2024-01-01 RX ORDER — INSULIN GLARGINE-YFGN 100 [IU]/ML
22 INJECTION, SOLUTION SUBCUTANEOUS AT BEDTIME
Refills: 0 | Status: DISCONTINUED | OUTPATIENT
Start: 2024-01-01 | End: 2024-01-01

## 2024-01-01 RX ORDER — HEPARIN SODIUM,PORCINE 10000/ML
1500 VIAL (ML) INJECTION
Qty: 25000 | Refills: 0 | Status: DISCONTINUED | OUTPATIENT
Start: 2024-01-01 | End: 2024-01-01

## 2024-01-01 RX ORDER — POLYETHYLENE GLYCOL 3350 17 G/17G
17 POWDER, FOR SOLUTION ORAL DAILY
Refills: 0 | Status: DISCONTINUED | OUTPATIENT
Start: 2024-01-01 | End: 2024-01-01

## 2024-01-01 RX ORDER — INSULIN LISPRO 100/ML
12 VIAL (ML) SUBCUTANEOUS
Refills: 0 | Status: DISCONTINUED | OUTPATIENT
Start: 2024-01-01 | End: 2024-01-01

## 2024-01-01 RX ORDER — BASILIXIMAB 20 MG/5ML
20 INJECTION, POWDER, FOR SOLUTION INTRAVENOUS ONCE
Refills: 0 | Status: DISCONTINUED | OUTPATIENT
Start: 2024-01-01 | End: 2024-01-01

## 2024-01-01 RX ORDER — CYCLOSPORINE 100 MG/1
50 CAPSULE, GELATIN COATED ORAL ONCE
Refills: 0 | Status: COMPLETED | OUTPATIENT
Start: 2024-01-01 | End: 2024-01-01

## 2024-01-01 RX ORDER — LIDOCAINE HYDROCHLORIDE 10 MG/ML
5 INJECTION EPIDURAL; INFILTRATION; INTRACAUDAL ONCE
Refills: 0 | Status: COMPLETED | OUTPATIENT
Start: 2024-01-01 | End: 2024-01-01

## 2024-01-01 RX ORDER — SOD PHOSPHATE,MONOBASIC-DIBAS 3MMOL/ML
15 VIAL (ML) INTRAVENOUS ONCE
Refills: 0 | Status: DISCONTINUED | OUTPATIENT
Start: 2024-01-01 | End: 2024-01-01

## 2024-01-01 RX ORDER — MECOBAL/LEVOMEFOLAT CA/B6 PHOS 2-3-35 MG
1 TABLET ORAL DAILY
Refills: 0 | Status: DISCONTINUED | OUTPATIENT
Start: 2024-01-01 | End: 2024-01-01

## 2024-01-01 RX ORDER — ALBUMIN HUMAN 50 G/1000ML
500 SOLUTION INTRAVENOUS EVERY 12 HOURS
Refills: 0 | Status: DISCONTINUED | OUTPATIENT
Start: 2024-01-01 | End: 2024-01-01

## 2024-01-01 RX ORDER — MYCOPHENOLATE MOFETIL 200 MG/ML
250 POWDER, FOR SUSPENSION ORAL
Refills: 0 | Status: DISCONTINUED | OUTPATIENT
Start: 2024-01-01 | End: 2024-01-01

## 2024-01-01 RX ORDER — ARIPIPRAZOLE 5 MG/1
2 TABLET ORAL ONCE
Refills: 0 | Status: COMPLETED | OUTPATIENT
Start: 2024-01-01 | End: 2024-01-01

## 2024-01-01 RX ORDER — ESMOLOL HCL 100MG/10ML
50 SYRINGE (ML) INTRAVENOUS
Qty: 2500 | Refills: 0 | Status: DISCONTINUED | OUTPATIENT
Start: 2024-01-01 | End: 2024-01-01

## 2024-01-01 RX ORDER — CEFEPIME HCL 1 G
1000 IV SOLUTION, PIGGYBACK, BOTTLE (EA) INTRAVENOUS ONCE
Refills: 0 | Status: COMPLETED | OUTPATIENT
Start: 2024-01-01 | End: 2024-01-01

## 2024-01-01 RX ORDER — PROPOFOL 10 MG/ML
50 INJECTION, EMULSION INTRAVENOUS
Qty: 1000 | Refills: 0 | Status: DISCONTINUED | OUTPATIENT
Start: 2024-01-01 | End: 2024-01-01

## 2024-01-01 RX ORDER — DOBUTAMINE HCL 250MG/20ML
5 VIAL (ML) INTRAVENOUS
Qty: 500 | Refills: 0 | Status: DISCONTINUED | OUTPATIENT
Start: 2024-01-01 | End: 2024-01-01

## 2024-01-01 RX ORDER — METHYLNALTREXONE BROMIDE 12 MG/.6ML
12 INJECTION, SOLUTION SUBCUTANEOUS ONCE
Refills: 0 | Status: DISCONTINUED | OUTPATIENT
Start: 2024-01-01 | End: 2024-01-01

## 2024-01-01 RX ORDER — SODIUM CHLORIDE 9 G/ML
250 INJECTION, SOLUTION INTRAVENOUS ONCE
Refills: 0 | Status: COMPLETED | OUTPATIENT
Start: 2024-01-01 | End: 2024-01-01

## 2024-01-01 RX ORDER — PHENYLEPHRINE HYDROCHLORIDE 10 MG/ML
1.5 INJECTION INTRAVENOUS
Qty: 160 | Refills: 0 | Status: DISCONTINUED | OUTPATIENT
Start: 2024-01-01 | End: 2024-01-01

## 2024-01-01 RX ORDER — LEVETIRACETAM 750 MG/1
250 TABLET, FILM COATED ORAL ONCE
Refills: 0 | Status: DISCONTINUED | OUTPATIENT
Start: 2024-01-01 | End: 2024-01-01

## 2024-01-01 RX ORDER — METHYLPREDNISOLONE ACETATE 40 MG/ML
500 VIAL (ML) INJECTION EVERY 24 HOURS
Refills: 0 | Status: COMPLETED | OUTPATIENT
Start: 2024-01-01 | End: 2024-01-01

## 2024-01-01 RX ORDER — PHENYLEPHRINE HYDROCHLORIDE 10 MG/ML
0.2 INJECTION INTRAVENOUS
Qty: 40 | Refills: 0 | Status: DISCONTINUED | OUTPATIENT
Start: 2024-01-01 | End: 2024-01-01

## 2024-01-01 RX ORDER — LEVETIRACETAM 750 MG/1
250 TABLET, FILM COATED ORAL
Refills: 0 | Status: DISCONTINUED | OUTPATIENT
Start: 2024-01-01 | End: 2024-01-01

## 2024-01-01 RX ORDER — EPINEPHRINE 5 MG/ML
0.1 VIAL (ML) INJECTION
Qty: 8 | Refills: 0 | Status: DISCONTINUED | OUTPATIENT
Start: 2024-01-01 | End: 2024-01-01

## 2024-01-01 RX ORDER — SODIUM BICARBONATE 42 MG/ML
50 INJECTION, SOLUTION INTRAVENOUS
Refills: 0 | Status: DISCONTINUED | OUTPATIENT
Start: 2024-01-01 | End: 2024-01-01

## 2024-01-01 RX ORDER — DILTIAZEM HYDROCHLORIDE 60 MG/1
10 TABLET ORAL ONCE
Refills: 0 | Status: DISCONTINUED | OUTPATIENT
Start: 2024-01-01 | End: 2024-01-01

## 2024-01-01 RX ORDER — PIPERACILLIN SODIUM AND TAZOBACTAM SODIUM 2; 250 G/50ML; MG/50ML
3.38 INJECTION, POWDER, FOR SOLUTION INTRAVENOUS ONCE
Refills: 0 | Status: DISCONTINUED | OUTPATIENT
Start: 2024-01-01 | End: 2024-01-01

## 2024-01-01 RX ORDER — NIFEDIPINE 90 MG/1
60 TABLET, FILM COATED, EXTENDED RELEASE ORAL DAILY
Refills: 0 | Status: DISCONTINUED | OUTPATIENT
Start: 2024-01-01 | End: 2024-01-01

## 2024-01-01 RX ADMIN — ALBUMIN HUMAN 125 MILLILITER(S): 50 SOLUTION INTRAVENOUS at 13:20

## 2024-01-01 RX ADMIN — MEROPENEM 100 MILLIGRAM(S): 500 INJECTION INTRAVENOUS at 05:11

## 2024-01-01 RX ADMIN — MIRTAZAPINE 7.5 MILLIGRAM(S): 30 TABLET, FILM COATED ORAL at 21:26

## 2024-01-01 RX ADMIN — IPRATROPIUM BROMIDE AND ALBUTEROL SULFATE 3 MILLILITER(S): .5; 2.5 SOLUTION RESPIRATORY (INHALATION) at 05:32

## 2024-01-01 RX ADMIN — CASPOFUNGIN ACETATE 250 MILLIGRAM(S): 5 INJECTION, POWDER, LYOPHILIZED, FOR SOLUTION INTRAVENOUS at 13:16

## 2024-01-01 RX ADMIN — Medication 30 MILLILITER(S): at 07:49

## 2024-01-01 RX ADMIN — Medication 100 MILLIGRAM(S): at 06:55

## 2024-01-01 RX ADMIN — Medication 1 TABLET(S): at 17:16

## 2024-01-01 RX ADMIN — Medication 32 MILLIGRAM(S): at 14:02

## 2024-01-01 RX ADMIN — CYCLOSPORINE 50 MILLIGRAM(S): 100 CAPSULE, GELATIN COATED ORAL at 08:24

## 2024-01-01 RX ADMIN — ACETAMINOPHEN 200 MILLIGRAM(S): 160 SUSPENSION ORAL at 17:12

## 2024-01-01 RX ADMIN — PANTOPRAZOLE 40 MILLIGRAM(S): 20 TABLET, DELAYED RELEASE ORAL at 11:15

## 2024-01-01 RX ADMIN — Medication 100 MILLIGRAM(S): at 16:40

## 2024-01-01 RX ADMIN — AMIODARONE HYDROCHLORIDE 8.33 MG/MIN: 50 INJECTION, SOLUTION INTRAVENOUS at 20:18

## 2024-01-01 RX ADMIN — Medication 4 MILLILITER(S): at 17:22

## 2024-01-01 RX ADMIN — HYDROMORPHONE HYDROCHLORIDE 0.5 MILLIGRAM(S): 4 INJECTION, SOLUTION INTRAMUSCULAR; INTRAVENOUS; SUBCUTANEOUS at 01:50

## 2024-01-01 RX ADMIN — SODIUM CHLORIDE 30 MILLILITER(S): 9 INJECTION, SOLUTION INTRAVENOUS at 01:45

## 2024-01-01 RX ADMIN — BUDESONIDE 0.5 MILLIGRAM(S): 9 TABLET, EXTENDED RELEASE ORAL at 17:22

## 2024-01-01 RX ADMIN — Medication 50 MILLIGRAM(S): at 21:18

## 2024-01-01 RX ADMIN — ANTISEPTIC SURGICAL SCRUB 15 MILLILITER(S): 0.04 SOLUTION TOPICAL at 18:55

## 2024-01-01 RX ADMIN — Medication 5 MILLIGRAM(S): at 15:57

## 2024-01-01 RX ADMIN — FENTANYL CITRATE 50 MICROGRAM(S): 50 INJECTION INTRAMUSCULAR; INTRAVENOUS at 18:06

## 2024-01-01 RX ADMIN — SODIUM PHOSPHATE, DIBASIC, ANHYDROUS, POTASSIUM PHOSPHATE, MONOBASIC, AND SODIUM PHOSPHATE, MONOBASIC, MONOHYDRATE 2 TABLET(S): 852; 155; 130 TABLET, COATED ORAL at 08:28

## 2024-01-01 RX ADMIN — SODIUM BICARBONATE 50 MILLIEQUIVALENT(S): 42 INJECTION, SOLUTION INTRAVENOUS at 17:47

## 2024-01-01 RX ADMIN — Medication 1.5 UNIT(S)/MIN: at 09:34

## 2024-01-01 RX ADMIN — Medication 2: at 06:14

## 2024-01-01 RX ADMIN — IPRATROPIUM BROMIDE AND ALBUTEROL SULFATE 3 MILLILITER(S): .5; 2.5 SOLUTION RESPIRATORY (INHALATION) at 23:08

## 2024-01-01 RX ADMIN — HYDROMORPHONE HYDROCHLORIDE 1 MILLIGRAM(S): 4 INJECTION, SOLUTION INTRAMUSCULAR; INTRAVENOUS; SUBCUTANEOUS at 04:11

## 2024-01-01 RX ADMIN — LIDOCAINE HYDROCHLORIDE 1 PATCH: 30 CREAM TOPICAL at 07:00

## 2024-01-01 RX ADMIN — Medication 200 GRAM(S): at 21:20

## 2024-01-01 RX ADMIN — Medication 4: at 05:11

## 2024-01-01 RX ADMIN — ANTISEPTIC SURGICAL SCRUB 1 APPLICATION(S): 0.04 SOLUTION TOPICAL at 06:08

## 2024-01-01 RX ADMIN — NYSTATIN 1 APPLICATION(S): 100000 POWDER TOPICAL at 05:11

## 2024-01-01 RX ADMIN — ALBUMIN HUMAN 1000 MILLILITER(S): 50 SOLUTION INTRAVENOUS at 01:15

## 2024-01-01 RX ADMIN — Medication 4 MILLILITER(S): at 23:28

## 2024-01-01 RX ADMIN — NIFEDIPINE 60 MILLIGRAM(S): 90 TABLET, FILM COATED, EXTENDED RELEASE ORAL at 05:05

## 2024-01-01 RX ADMIN — Medication 5.62 MICROGRAM(S)/KG/MIN: at 19:22

## 2024-01-01 RX ADMIN — FENTANYL CITRATE 1 PATCH: 50 INJECTION INTRAMUSCULAR; INTRAVENOUS at 08:39

## 2024-01-01 RX ADMIN — PHENYLEPHRINE HYDROCHLORIDE 1.87 MICROGRAM(S)/KG/MIN: 10 INJECTION INTRAVENOUS at 08:23

## 2024-01-01 RX ADMIN — Medication 4 MILLILITER(S): at 23:08

## 2024-01-01 RX ADMIN — QUETIAPINE FUMARATE 50 MILLIGRAM(S): 300 TABLET ORAL at 21:14

## 2024-01-01 RX ADMIN — DEXMEDETOMIDINE HYDROCHLORIDE 35.1 MICROGRAM(S)/KG/HR: 4 INJECTION, SOLUTION INTRAVENOUS at 20:03

## 2024-01-01 RX ADMIN — ANTISEPTIC SURGICAL SCRUB 1 APPLICATION(S): 0.04 SOLUTION TOPICAL at 06:01

## 2024-01-01 RX ADMIN — ALBUMIN HUMAN 125 MILLILITER(S): 50 SOLUTION INTRAVENOUS at 09:48

## 2024-01-01 RX ADMIN — MYCOPHENOLATE MOFETIL 500 MILLIGRAM(S): 200 POWDER, FOR SUSPENSION ORAL at 09:15

## 2024-01-01 RX ADMIN — LIDOCAINE HYDROCHLORIDE 1 PATCH: 30 CREAM TOPICAL at 03:05

## 2024-01-01 RX ADMIN — ALBUMIN HUMAN 500 MILLILITER(S): 50 SOLUTION INTRAVENOUS at 14:45

## 2024-01-01 RX ADMIN — HYDROMORPHONE HYDROCHLORIDE 0.2 MILLIGRAM(S): 4 INJECTION, SOLUTION INTRAMUSCULAR; INTRAVENOUS; SUBCUTANEOUS at 06:00

## 2024-01-01 RX ADMIN — Medication 1.5 UNIT(S)/MIN: at 08:37

## 2024-01-01 RX ADMIN — Medication 100 MILLIGRAM(S): at 02:30

## 2024-01-01 RX ADMIN — FENTANYL CITRATE 25 MICROGRAM(S): 50 INJECTION INTRAMUSCULAR; INTRAVENOUS at 17:06

## 2024-01-01 RX ADMIN — BUDESONIDE 0.5 MILLIGRAM(S): 9 TABLET, EXTENDED RELEASE ORAL at 05:07

## 2024-01-01 RX ADMIN — ALBUMIN HUMAN 125 MILLILITER(S): 50 SOLUTION INTRAVENOUS at 21:31

## 2024-01-01 RX ADMIN — LEVETIRACETAM 400 MILLIGRAM(S): 750 TABLET, FILM COATED ORAL at 05:26

## 2024-01-01 RX ADMIN — MEROPENEM 100 MILLIGRAM(S): 500 INJECTION INTRAVENOUS at 15:15

## 2024-01-01 RX ADMIN — MIDODRINE HYDROCHLORIDE 10 MILLIGRAM(S): 5 TABLET ORAL at 21:48

## 2024-01-01 RX ADMIN — Medication 4 MILLILITER(S): at 11:11

## 2024-01-01 RX ADMIN — Medication 2: at 11:23

## 2024-01-01 RX ADMIN — Medication 40 MILLIGRAM(S): at 17:43

## 2024-01-01 RX ADMIN — MIDODRINE HYDROCHLORIDE 10 MILLIGRAM(S): 5 TABLET ORAL at 21:37

## 2024-01-01 RX ADMIN — Medication 4: at 23:51

## 2024-01-01 RX ADMIN — ALBUMIN HUMAN 125 MILLILITER(S): 50 SOLUTION INTRAVENOUS at 16:40

## 2024-01-01 RX ADMIN — Medication 4 MILLILITER(S): at 05:09

## 2024-01-01 RX ADMIN — Medication 100 UNIT(S): at 20:43

## 2024-01-01 RX ADMIN — HYDROMORPHONE HYDROCHLORIDE 1 MILLIGRAM(S): 4 INJECTION, SOLUTION INTRAMUSCULAR; INTRAVENOUS; SUBCUTANEOUS at 12:02

## 2024-01-01 RX ADMIN — LIDOCAINE HYDROCHLORIDE 1 PATCH: 30 CREAM TOPICAL at 15:41

## 2024-01-01 RX ADMIN — Medication 6: at 12:59

## 2024-01-01 RX ADMIN — ALBUMIN HUMAN 125 MILLILITER(S): 50 SOLUTION INTRAVENOUS at 13:30

## 2024-01-01 RX ADMIN — Medication 25 MILLIGRAM(S): at 08:59

## 2024-01-01 RX ADMIN — Medication 1 TABLET(S): at 17:24

## 2024-01-01 RX ADMIN — CYCLOSPORINE 50 MILLIGRAM(S): 100 CAPSULE, GELATIN COATED ORAL at 20:01

## 2024-01-01 RX ADMIN — HYDROMORPHONE HYDROCHLORIDE 0.5 MILLIGRAM(S): 4 INJECTION, SOLUTION INTRAMUSCULAR; INTRAVENOUS; SUBCUTANEOUS at 09:51

## 2024-01-01 RX ADMIN — SULFAMETHOXAZOLE AND TRIMETHOPRIM 261.88 MILLIGRAM(S): 400; 80 TABLET ORAL at 03:42

## 2024-01-01 RX ADMIN — PANTOPRAZOLE 40 MILLIGRAM(S): 20 TABLET, DELAYED RELEASE ORAL at 14:41

## 2024-01-01 RX ADMIN — Medication 200 GRAM(S): at 09:38

## 2024-01-01 RX ADMIN — Medication 5 MILLIGRAM(S): at 04:18

## 2024-01-01 RX ADMIN — ACETAMINOPHEN 500 MILLIGRAM(S): 160 SUSPENSION ORAL at 17:42

## 2024-01-01 RX ADMIN — PIPERACILLIN SODIUM AND TAZOBACTAM SODIUM 200 GRAM(S): 2; 250 INJECTION, POWDER, FOR SOLUTION INTRAVENOUS at 11:57

## 2024-01-01 RX ADMIN — IPRATROPIUM BROMIDE AND ALBUTEROL SULFATE 3 MILLILITER(S): .5; 2.5 SOLUTION RESPIRATORY (INHALATION) at 23:01

## 2024-01-01 RX ADMIN — SULFAMETHOXAZOLE AND TRIMETHOPRIM 261.88 MILLIGRAM(S): 400; 80 TABLET ORAL at 03:39

## 2024-01-01 RX ADMIN — POTASSIUM CHLORIDE 100 MILLIEQUIVALENT(S): 750 TABLET, EXTENDED RELEASE ORAL at 22:08

## 2024-01-01 RX ADMIN — HYDROMORPHONE HYDROCHLORIDE 0.5 MILLIGRAM(S): 4 INJECTION, SOLUTION INTRAMUSCULAR; INTRAVENOUS; SUBCUTANEOUS at 10:50

## 2024-01-01 RX ADMIN — POLYETHYLENE GLYCOL 3350 17 GRAM(S): 17 POWDER, FOR SOLUTION ORAL at 05:03

## 2024-01-01 RX ADMIN — HYDROMORPHONE HYDROCHLORIDE 0.5 MILLIGRAM(S): 4 INJECTION, SOLUTION INTRAMUSCULAR; INTRAVENOUS; SUBCUTANEOUS at 00:57

## 2024-01-01 RX ADMIN — Medication 0.5 MILLIGRAM(S): at 06:33

## 2024-01-01 RX ADMIN — Medication 5 MILLIGRAM(S): at 07:20

## 2024-01-01 RX ADMIN — PROPOFOL 2.81 MICROGRAM(S)/KG/MIN: 10 INJECTION, EMULSION INTRAVENOUS at 04:24

## 2024-01-01 RX ADMIN — ALBUMIN HUMAN 50 MILLILITER(S): 50 SOLUTION INTRAVENOUS at 23:19

## 2024-01-01 RX ADMIN — CYCLOSPORINE 25 MILLIGRAM(S): 100 CAPSULE, GELATIN COATED ORAL at 09:26

## 2024-01-01 RX ADMIN — MEROPENEM 100 MILLIGRAM(S): 500 INJECTION INTRAVENOUS at 05:00

## 2024-01-01 RX ADMIN — NYSTATIN 1 APPLICATION(S): 100000 POWDER TOPICAL at 05:21

## 2024-01-01 RX ADMIN — SODIUM BICARBONATE 125 MEQ/KG/HR: 42 INJECTION, SOLUTION INTRAVENOUS at 01:15

## 2024-01-01 RX ADMIN — ACETAMINOPHEN 650 MILLIGRAM(S): 160 SUSPENSION ORAL at 11:40

## 2024-01-01 RX ADMIN — SODIUM PHOSPHATE, DIBASIC, ANHYDROUS, POTASSIUM PHOSPHATE, MONOBASIC, AND SODIUM PHOSPHATE, MONOBASIC, MONOHYDRATE 1 PACKET(S): 852; 155; 130 TABLET, COATED ORAL at 06:36

## 2024-01-01 RX ADMIN — Medication 125 MILLIGRAM(S): at 05:00

## 2024-01-01 RX ADMIN — MIDODRINE HYDROCHLORIDE 5 MILLIGRAM(S): 5 TABLET ORAL at 06:03

## 2024-01-01 RX ADMIN — Medication 5 MILLIGRAM(S): at 04:26

## 2024-01-01 RX ADMIN — Medication 8: at 12:16

## 2024-01-01 RX ADMIN — Medication 5.62 MICROGRAM(S)/KG/MIN: at 07:36

## 2024-01-01 RX ADMIN — MEROPENEM 100 MILLIGRAM(S): 500 INJECTION INTRAVENOUS at 13:19

## 2024-01-01 RX ADMIN — LEVETIRACETAM 400 MILLIGRAM(S): 750 TABLET, FILM COATED ORAL at 17:50

## 2024-01-01 RX ADMIN — Medication 25 MILLIGRAM(S): at 12:52

## 2024-01-01 RX ADMIN — ASPIRIN 81 MILLIGRAM(S): 81 TABLET, COATED ORAL at 11:19

## 2024-01-01 RX ADMIN — PANTOPRAZOLE 40 MILLIGRAM(S): 20 TABLET, DELAYED RELEASE ORAL at 11:13

## 2024-01-01 RX ADMIN — Medication 1.5 UNIT(S)/MIN: at 19:42

## 2024-01-01 RX ADMIN — Medication 100 MILLIGRAM(S): at 21:54

## 2024-01-01 RX ADMIN — DILTIAZEM HYDROCHLORIDE 15 MILLIGRAM(S): 60 TABLET ORAL at 09:20

## 2024-01-01 RX ADMIN — PANTOPRAZOLE 40 MILLIGRAM(S): 20 TABLET, DELAYED RELEASE ORAL at 06:01

## 2024-01-01 RX ADMIN — NYSTATIN 1 APPLICATION(S): 100000 POWDER TOPICAL at 23:17

## 2024-01-01 RX ADMIN — MUPIROCIN 1 APPLICATION(S): 2 CREAM TOPICAL at 05:56

## 2024-01-01 RX ADMIN — BUDESONIDE 0.5 MILLIGRAM(S): 9 TABLET, EXTENDED RELEASE ORAL at 05:19

## 2024-01-01 RX ADMIN — ASPIRIN 81 MILLIGRAM(S): 81 TABLET, COATED ORAL at 12:05

## 2024-01-01 RX ADMIN — ALBUMIN HUMAN 125 MILLILITER(S): 50 SOLUTION INTRAVENOUS at 07:49

## 2024-01-01 RX ADMIN — LINEZOLID 300 MILLIGRAM(S): 600 INJECTION, SOLUTION INTRAVENOUS at 06:00

## 2024-01-01 RX ADMIN — Medication 25 GRAM(S): at 02:58

## 2024-01-01 RX ADMIN — ACETAMINOPHEN, DIPHENHYDRAMINE HCL, PHENYLEPHRINE HCL 5 MILLIGRAM(S): 325; 25; 5 TABLET ORAL at 23:43

## 2024-01-01 RX ADMIN — POTASSIUM CHLORIDE 100 MILLIEQUIVALENT(S): 750 TABLET, EXTENDED RELEASE ORAL at 16:57

## 2024-01-01 RX ADMIN — ATORVASTATIN CALCIUM 80 MILLIGRAM(S): 80 TABLET, FILM COATED ORAL at 21:47

## 2024-01-01 RX ADMIN — ALBUMIN HUMAN 50 MILLILITER(S): 50 SOLUTION INTRAVENOUS at 13:56

## 2024-01-01 RX ADMIN — SODIUM BICARBONATE 50 MILLIEQUIVALENT(S): 42 INJECTION, SOLUTION INTRAVENOUS at 03:04

## 2024-01-01 RX ADMIN — FLUCONAZOLE 100 MILLIGRAM(S): 100 TABLET ORAL at 11:18

## 2024-01-01 RX ADMIN — LINEZOLID 300 MILLIGRAM(S): 600 INJECTION, SOLUTION INTRAVENOUS at 17:53

## 2024-01-01 RX ADMIN — MEROPENEM 100 MILLIGRAM(S): 500 INJECTION INTRAVENOUS at 20:50

## 2024-01-01 RX ADMIN — Medication 50 MILLIGRAM(S): at 15:34

## 2024-01-01 RX ADMIN — SODIUM CHLORIDE 30 MILLILITER(S): 9 INJECTION, SOLUTION INTRAVENOUS at 07:15

## 2024-01-01 RX ADMIN — ANTISEPTIC SURGICAL SCRUB 15 MILLILITER(S): 0.04 SOLUTION TOPICAL at 05:12

## 2024-01-01 RX ADMIN — MUPIROCIN 1 APPLICATION(S): 2 CREAM TOPICAL at 17:38

## 2024-01-01 RX ADMIN — MEROPENEM 100 MILLIGRAM(S): 500 INJECTION INTRAVENOUS at 15:33

## 2024-01-01 RX ADMIN — SODIUM CHLORIDE 84 MILLILITER(S): 9 INJECTION, SOLUTION INTRAVENOUS at 19:41

## 2024-01-01 RX ADMIN — ALBUMIN HUMAN 1000 MILLILITER(S): 50 SOLUTION INTRAVENOUS at 04:50

## 2024-01-01 RX ADMIN — PROPOFOL 2.81 MICROGRAM(S)/KG/MIN: 10 INJECTION, EMULSION INTRAVENOUS at 17:41

## 2024-01-01 RX ADMIN — LEVETIRACETAM 400 MILLIGRAM(S): 750 TABLET, FILM COATED ORAL at 18:55

## 2024-01-01 RX ADMIN — POTASSIUM PHOSPHATE, MONOBASIC POTASSIUM PHOSPHATE, DIBASIC 83.33 MILLIMOLE(S): 236; 224 INJECTION, SOLUTION INTRAVENOUS at 17:29

## 2024-01-01 RX ADMIN — SODIUM CHLORIDE 250 MILLILITER(S): 9 INJECTION, SOLUTION INTRAVENOUS at 14:57

## 2024-01-01 RX ADMIN — Medication 6 UNIT(S): at 06:13

## 2024-01-01 RX ADMIN — MEROPENEM 100 MILLIGRAM(S): 500 INJECTION INTRAVENOUS at 21:23

## 2024-01-01 RX ADMIN — BUDESONIDE 0.5 MILLIGRAM(S): 9 TABLET, EXTENDED RELEASE ORAL at 17:44

## 2024-01-01 RX ADMIN — Medication 4 MILLILITER(S): at 11:07

## 2024-01-01 RX ADMIN — CYCLOSPORINE 25 MILLIGRAM(S): 100 CAPSULE, GELATIN COATED ORAL at 09:02

## 2024-01-01 RX ADMIN — Medication 1 TABLET(S): at 05:03

## 2024-01-01 RX ADMIN — OXYCODONE HYDROCHLORIDE 5 MILLIGRAM(S): 30 TABLET ORAL at 01:48

## 2024-01-01 RX ADMIN — OXYCODONE HYDROCHLORIDE 10 MILLIGRAM(S): 30 TABLET ORAL at 17:56

## 2024-01-01 RX ADMIN — MEROPENEM 100 MILLIGRAM(S): 500 INJECTION INTRAVENOUS at 21:18

## 2024-01-01 RX ADMIN — Medication 100 MILLIGRAM(S): at 14:49

## 2024-01-01 RX ADMIN — Medication 4: at 11:57

## 2024-01-01 RX ADMIN — MYCOPHENOLATE MOFETIL 500 MILLIGRAM(S): 200 POWDER, FOR SUSPENSION ORAL at 20:02

## 2024-01-01 RX ADMIN — Medication 200 GRAM(S): at 23:16

## 2024-01-01 RX ADMIN — LIDOCAINE HYDROCHLORIDE 1 PATCH: 30 CREAM TOPICAL at 07:36

## 2024-01-01 RX ADMIN — LIDOCAINE HYDROCHLORIDE 5 MILLILITER(S): 10 INJECTION EPIDURAL; INFILTRATION; INTRACAUDAL at 08:06

## 2024-01-01 RX ADMIN — LEVETIRACETAM 400 MILLIGRAM(S): 750 TABLET, FILM COATED ORAL at 06:12

## 2024-01-01 RX ADMIN — NOREPINEPHRINE BITARTRATE 9.33 MICROGRAM(S)/KG/MIN: 1 INJECTION, SOLUTION, CONCENTRATE INTRAVENOUS at 08:22

## 2024-01-01 RX ADMIN — QUETIAPINE FUMARATE 25 MILLIGRAM(S): 300 TABLET ORAL at 21:30

## 2024-01-01 RX ADMIN — Medication 0.5 MILLIGRAM(S): at 22:31

## 2024-01-01 RX ADMIN — Medication 50 MILLIGRAM(S): at 22:06

## 2024-01-01 RX ADMIN — BUDESONIDE 0.5 MILLIGRAM(S): 9 TABLET, EXTENDED RELEASE ORAL at 17:52

## 2024-01-01 RX ADMIN — Medication 15 UNIT(S)/MIN: at 00:59

## 2024-01-01 RX ADMIN — Medication 12 UNIT(S): at 05:42

## 2024-01-01 RX ADMIN — SODIUM CHLORIDE 75 MILLILITER(S): 9 INJECTION, SOLUTION INTRAVENOUS at 19:52

## 2024-01-01 RX ADMIN — Medication 200 GRAM(S): at 21:13

## 2024-01-01 RX ADMIN — BUDESONIDE 0.5 MILLIGRAM(S): 9 TABLET, EXTENDED RELEASE ORAL at 17:47

## 2024-01-01 RX ADMIN — IPRATROPIUM BROMIDE AND ALBUTEROL SULFATE 3 MILLILITER(S): .5; 2.5 SOLUTION RESPIRATORY (INHALATION) at 11:15

## 2024-01-01 RX ADMIN — CYCLOSPORINE 4.21 MILLIGRAM(S): 100 CAPSULE, GELATIN COATED ORAL at 11:19

## 2024-01-01 RX ADMIN — ALBUMIN HUMAN 500 MILLILITER(S): 50 SOLUTION INTRAVENOUS at 11:58

## 2024-01-01 RX ADMIN — PANTOPRAZOLE 40 MILLIGRAM(S): 20 TABLET, DELAYED RELEASE ORAL at 13:40

## 2024-01-01 RX ADMIN — SODIUM CHLORIDE 1000 MILLILITER(S): 9 INJECTION, SOLUTION INTRAVENOUS at 23:43

## 2024-01-01 RX ADMIN — Medication 5000 UNIT(S): at 05:33

## 2024-01-01 RX ADMIN — BUDESONIDE 0.5 MILLIGRAM(S): 9 TABLET, EXTENDED RELEASE ORAL at 05:20

## 2024-01-01 RX ADMIN — Medication 1 TABLET(S): at 05:25

## 2024-01-01 RX ADMIN — LINEZOLID 300 MILLIGRAM(S): 600 INJECTION, SOLUTION INTRAVENOUS at 05:01

## 2024-01-01 RX ADMIN — SODIUM CHLORIDE 75 MILLILITER(S): 9 INJECTION, SOLUTION INTRAVENOUS at 00:21

## 2024-01-01 RX ADMIN — IPRATROPIUM BROMIDE AND ALBUTEROL SULFATE 3 MILLILITER(S): .5; 2.5 SOLUTION RESPIRATORY (INHALATION) at 17:22

## 2024-01-01 RX ADMIN — CYCLOSPORINE 50 MILLIGRAM(S): 100 CAPSULE, GELATIN COATED ORAL at 07:28

## 2024-01-01 RX ADMIN — POTASSIUM CHLORIDE 100 MILLIEQUIVALENT(S): 750 TABLET, EXTENDED RELEASE ORAL at 04:03

## 2024-01-01 RX ADMIN — Medication 125 MICROGRAM(S): at 11:59

## 2024-01-01 RX ADMIN — APIXABAN 2.5 MILLIGRAM(S): 5 TABLET, FILM COATED ORAL at 06:11

## 2024-01-01 RX ADMIN — OXYCODONE HYDROCHLORIDE 5 MILLIGRAM(S): 30 TABLET ORAL at 06:15

## 2024-01-01 RX ADMIN — HYDROMORPHONE HYDROCHLORIDE 1 MILLIGRAM(S): 4 INJECTION, SOLUTION INTRAMUSCULAR; INTRAVENOUS; SUBCUTANEOUS at 01:00

## 2024-01-01 RX ADMIN — SODIUM CHLORIDE 125 MILLILITER(S): 9 INJECTION, SOLUTION INTRAVENOUS at 14:04

## 2024-01-01 RX ADMIN — HYDROMORPHONE HYDROCHLORIDE 0.5 MILLIGRAM(S): 4 INJECTION, SOLUTION INTRAMUSCULAR; INTRAVENOUS; SUBCUTANEOUS at 02:15

## 2024-01-01 RX ADMIN — MUPIROCIN 1 APPLICATION(S): 2 CREAM TOPICAL at 17:12

## 2024-01-01 RX ADMIN — ANTISEPTIC SURGICAL SCRUB 15 MILLILITER(S): 0.04 SOLUTION TOPICAL at 18:16

## 2024-01-01 RX ADMIN — Medication 1 TABLET(S): at 17:38

## 2024-01-01 RX ADMIN — Medication 100 MILLIGRAM(S): at 10:02

## 2024-01-01 RX ADMIN — Medication 8: at 23:34

## 2024-01-01 RX ADMIN — BUDESONIDE 0.5 MILLIGRAM(S): 9 TABLET, EXTENDED RELEASE ORAL at 06:31

## 2024-01-01 RX ADMIN — MUPIROCIN 1 APPLICATION(S): 2 CREAM TOPICAL at 06:56

## 2024-01-01 RX ADMIN — POTASSIUM CHLORIDE 20 MILLIEQUIVALENT(S): 750 TABLET, EXTENDED RELEASE ORAL at 07:16

## 2024-01-01 RX ADMIN — HYDROMORPHONE HYDROCHLORIDE 1 MILLIGRAM(S): 4 INJECTION, SOLUTION INTRAMUSCULAR; INTRAVENOUS; SUBCUTANEOUS at 19:49

## 2024-01-01 RX ADMIN — PROPOFOL 16.8 MICROGRAM(S)/KG/MIN: 10 INJECTION, EMULSION INTRAVENOUS at 20:40

## 2024-01-01 RX ADMIN — Medication 200 GRAM(S): at 16:45

## 2024-01-01 RX ADMIN — Medication 2: at 18:16

## 2024-01-01 RX ADMIN — APIXABAN 5 MILLIGRAM(S): 5 TABLET, FILM COATED ORAL at 17:02

## 2024-01-01 RX ADMIN — Medication 32 MILLIGRAM(S): at 12:29

## 2024-01-01 RX ADMIN — MYCOPHENOLATE MOFETIL 250 MILLIGRAM(S): 200 POWDER, FOR SUSPENSION ORAL at 08:09

## 2024-01-01 RX ADMIN — Medication 14 MICROGRAM(S)/KG/MIN: at 19:42

## 2024-01-01 RX ADMIN — Medication 5 MILLIGRAM(S): at 12:15

## 2024-01-01 RX ADMIN — MEROPENEM 100 MILLIGRAM(S): 500 INJECTION INTRAVENOUS at 06:00

## 2024-01-01 RX ADMIN — Medication 255 MILLIMOLE(S): at 18:43

## 2024-01-01 RX ADMIN — FENTANYL CITRATE 25 MICROGRAM(S): 50 INJECTION INTRAMUSCULAR; INTRAVENOUS at 09:55

## 2024-01-01 RX ADMIN — ASPIRIN 81 MILLIGRAM(S): 81 TABLET, COATED ORAL at 12:26

## 2024-01-01 RX ADMIN — PIPERACILLIN SODIUM AND TAZOBACTAM SODIUM 25 GRAM(S): 2; 250 INJECTION, POWDER, FOR SOLUTION INTRAVENOUS at 11:33

## 2024-01-01 RX ADMIN — NOREPINEPHRINE BITARTRATE 5.27 MICROGRAM(S)/KG/MIN: 1 INJECTION, SOLUTION, CONCENTRATE INTRAVENOUS at 14:41

## 2024-01-01 RX ADMIN — ANTISEPTIC SURGICAL SCRUB 15 MILLILITER(S): 0.04 SOLUTION TOPICAL at 18:18

## 2024-01-01 RX ADMIN — CYCLOSPORINE 50 MILLIGRAM(S): 100 CAPSULE, GELATIN COATED ORAL at 09:16

## 2024-01-01 RX ADMIN — LINEZOLID 300 MILLIGRAM(S): 600 INJECTION, SOLUTION INTRAVENOUS at 17:56

## 2024-01-01 RX ADMIN — Medication 0.25 MILLIGRAM(S): at 22:06

## 2024-01-01 RX ADMIN — LEVETIRACETAM 400 MILLIGRAM(S): 750 TABLET, FILM COATED ORAL at 18:10

## 2024-01-01 RX ADMIN — HYDROMORPHONE HYDROCHLORIDE 0.5 MILLIGRAM(S): 4 INJECTION, SOLUTION INTRAMUSCULAR; INTRAVENOUS; SUBCUTANEOUS at 23:12

## 2024-01-01 RX ADMIN — Medication 2: at 07:05

## 2024-01-01 RX ADMIN — SULFAMETHOXAZOLE AND TRIMETHOPRIM 1 TABLET(S): 400; 80 TABLET ORAL at 12:05

## 2024-01-01 RX ADMIN — MYCOPHENOLATE MOFETIL 250 MILLIGRAM(S): 200 POWDER, FOR SUSPENSION ORAL at 14:41

## 2024-01-01 RX ADMIN — OXYCODONE HYDROCHLORIDE 10 MILLIGRAM(S): 30 TABLET ORAL at 14:00

## 2024-01-01 RX ADMIN — Medication 25 MILLILITER(S): at 14:26

## 2024-01-01 RX ADMIN — PIPERACILLIN SODIUM AND TAZOBACTAM SODIUM 25 GRAM(S): 2; 250 INJECTION, POWDER, FOR SOLUTION INTRAVENOUS at 14:08

## 2024-01-01 RX ADMIN — HYDROMORPHONE HYDROCHLORIDE 1 MILLIGRAM(S): 4 INJECTION, SOLUTION INTRAMUSCULAR; INTRAVENOUS; SUBCUTANEOUS at 22:59

## 2024-01-01 RX ADMIN — Medication 32 MILLIGRAM(S): at 13:01

## 2024-01-01 RX ADMIN — MEROPENEM 100 MILLIGRAM(S): 500 INJECTION INTRAVENOUS at 21:02

## 2024-01-01 RX ADMIN — ANTISEPTIC SURGICAL SCRUB 1 APPLICATION(S): 0.04 SOLUTION TOPICAL at 06:14

## 2024-01-01 RX ADMIN — Medication 30 MILLILITER(S): at 06:07

## 2024-01-01 RX ADMIN — Medication 5000 UNIT(S): at 17:59

## 2024-01-01 RX ADMIN — Medication 4 MILLILITER(S): at 12:05

## 2024-01-01 RX ADMIN — Medication 200 GRAM(S): at 12:57

## 2024-01-01 RX ADMIN — Medication 12.5 MILLIGRAM(S): at 17:39

## 2024-01-01 RX ADMIN — ALBUMIN HUMAN 125 MILLILITER(S): 50 SOLUTION INTRAVENOUS at 18:31

## 2024-01-01 RX ADMIN — IPRATROPIUM BROMIDE AND ALBUTEROL SULFATE 3 MILLILITER(S): .5; 2.5 SOLUTION RESPIRATORY (INHALATION) at 05:51

## 2024-01-01 RX ADMIN — Medication 10 MILLILITER(S): at 21:15

## 2024-01-01 RX ADMIN — ALBUMIN HUMAN 50 MILLILITER(S): 50 SOLUTION INTRAVENOUS at 21:07

## 2024-01-01 RX ADMIN — Medication 14 MICROGRAM(S)/KG/MIN: at 20:47

## 2024-01-01 RX ADMIN — LEVETIRACETAM 400 MILLIGRAM(S): 750 TABLET, FILM COATED ORAL at 17:26

## 2024-01-01 RX ADMIN — IPRATROPIUM BROMIDE AND ALBUTEROL SULFATE 3 MILLILITER(S): .5; 2.5 SOLUTION RESPIRATORY (INHALATION) at 23:00

## 2024-01-01 RX ADMIN — NYSTATIN 1 APPLICATION(S): 100000 POWDER TOPICAL at 06:00

## 2024-01-01 RX ADMIN — SODIUM BICARBONATE 1300 MILLIGRAM(S): 42 INJECTION, SOLUTION INTRAVENOUS at 17:34

## 2024-01-01 RX ADMIN — APIXABAN 2.5 MILLIGRAM(S): 5 TABLET, FILM COATED ORAL at 18:53

## 2024-01-01 RX ADMIN — PANTOPRAZOLE 40 MILLIGRAM(S): 20 TABLET, DELAYED RELEASE ORAL at 07:00

## 2024-01-01 RX ADMIN — NOREPINEPHRINE BITARTRATE 9.33 MICROGRAM(S)/KG/MIN: 1 INJECTION, SOLUTION, CONCENTRATE INTRAVENOUS at 19:22

## 2024-01-01 RX ADMIN — Medication 5000 UNIT(S): at 05:12

## 2024-01-01 RX ADMIN — IPRATROPIUM BROMIDE AND ALBUTEROL SULFATE 3 MILLILITER(S): .5; 2.5 SOLUTION RESPIRATORY (INHALATION) at 05:07

## 2024-01-01 RX ADMIN — LINEZOLID 300 MILLIGRAM(S): 600 INJECTION, SOLUTION INTRAVENOUS at 06:02

## 2024-01-01 RX ADMIN — POTASSIUM PHOSPHATE, MONOBASIC POTASSIUM PHOSPHATE, DIBASIC 62.5 MILLIMOLE(S): 236; 224 INJECTION, SOLUTION INTRAVENOUS at 03:38

## 2024-01-01 RX ADMIN — Medication 32 MILLIGRAM(S): at 11:42

## 2024-01-01 RX ADMIN — Medication 16 MILLIGRAM(S): at 05:28

## 2024-01-01 RX ADMIN — ANTISEPTIC SURGICAL SCRUB 15 MILLILITER(S): 0.04 SOLUTION TOPICAL at 18:06

## 2024-01-01 RX ADMIN — GANCICLOVIR 100 MILLIGRAM(S): 250 CAPSULE ORAL at 14:54

## 2024-01-01 RX ADMIN — SODIUM BICARBONATE 50 MILLIEQUIVALENT(S): 42 INJECTION, SOLUTION INTRAVENOUS at 02:19

## 2024-01-01 RX ADMIN — CALCIUM CHLORIDE 1000 MILLIGRAM(S): 100 INJECTION INTRAVENOUS; INTRAVENTRICULAR at 18:30

## 2024-01-01 RX ADMIN — Medication 200 GRAM(S): at 17:43

## 2024-01-01 RX ADMIN — Medication 5 MILLIGRAM(S): at 18:25

## 2024-01-01 RX ADMIN — Medication 200 GRAM(S): at 23:40

## 2024-01-01 RX ADMIN — ANTISEPTIC SURGICAL SCRUB 15 MILLILITER(S): 0.04 SOLUTION TOPICAL at 05:03

## 2024-01-01 RX ADMIN — ANTISEPTIC SURGICAL SCRUB 15 MILLILITER(S): 0.04 SOLUTION TOPICAL at 06:59

## 2024-01-01 RX ADMIN — NYSTATIN 1 APPLICATION(S): 100000 POWDER TOPICAL at 17:26

## 2024-01-01 RX ADMIN — Medication 5 MILLIGRAM(S): at 12:58

## 2024-01-01 RX ADMIN — Medication 200 GRAM(S): at 14:43

## 2024-01-01 RX ADMIN — Medication 1 UNIT(S)/HR: at 14:31

## 2024-01-01 RX ADMIN — MYCOPHENOLATE MOFETIL 1000 MILLIGRAM(S): 200 POWDER, FOR SUSPENSION ORAL at 19:35

## 2024-01-01 RX ADMIN — LINEZOLID 300 MILLIGRAM(S): 600 INJECTION, SOLUTION INTRAVENOUS at 06:12

## 2024-01-01 RX ADMIN — MEROPENEM 100 MILLIGRAM(S): 500 INJECTION INTRAVENOUS at 06:01

## 2024-01-01 RX ADMIN — Medication 32 MILLIGRAM(S): at 12:57

## 2024-01-01 RX ADMIN — OXYCODONE HYDROCHLORIDE 5 MILLIGRAM(S): 30 TABLET ORAL at 19:30

## 2024-01-01 RX ADMIN — PIPERACILLIN SODIUM AND TAZOBACTAM SODIUM 200 GRAM(S): 2; 250 INJECTION, POWDER, FOR SOLUTION INTRAVENOUS at 07:40

## 2024-01-01 RX ADMIN — HYDROMORPHONE HYDROCHLORIDE 1 MILLIGRAM(S): 4 INJECTION, SOLUTION INTRAMUSCULAR; INTRAVENOUS; SUBCUTANEOUS at 08:13

## 2024-01-01 RX ADMIN — Medication 20 MILLILITER(S): at 04:00

## 2024-01-01 RX ADMIN — IPRATROPIUM BROMIDE AND ALBUTEROL SULFATE 3 MILLILITER(S): .5; 2.5 SOLUTION RESPIRATORY (INHALATION) at 23:46

## 2024-01-01 RX ADMIN — LABETALOL HYDROCHLORIDE 10 MILLIGRAM(S): 300 TABLET, FILM COATED ORAL at 07:38

## 2024-01-01 RX ADMIN — Medication 100 MILLIGRAM(S): at 19:39

## 2024-01-01 RX ADMIN — HYDROMORPHONE HYDROCHLORIDE 1 MILLIGRAM(S): 4 INJECTION, SOLUTION INTRAMUSCULAR; INTRAVENOUS; SUBCUTANEOUS at 00:00

## 2024-01-01 RX ADMIN — SODIUM CHLORIDE 50 MILLILITER(S): 9 INJECTION, SOLUTION INTRAVENOUS at 22:44

## 2024-01-01 RX ADMIN — Medication 50 MILLIGRAM(S): at 05:10

## 2024-01-01 RX ADMIN — Medication 15 UNIT(S): at 12:46

## 2024-01-01 RX ADMIN — ACETAMINOPHEN, DIPHENHYDRAMINE HCL, PHENYLEPHRINE HCL 5 MILLIGRAM(S): 325; 25; 5 TABLET ORAL at 21:05

## 2024-01-01 RX ADMIN — PANTOPRAZOLE 40 MILLIGRAM(S): 20 TABLET, DELAYED RELEASE ORAL at 11:09

## 2024-01-01 RX ADMIN — PIPERACILLIN SODIUM AND TAZOBACTAM SODIUM 25 GRAM(S): 2; 250 INJECTION, POWDER, FOR SOLUTION INTRAVENOUS at 22:16

## 2024-01-01 RX ADMIN — ANTISEPTIC SURGICAL SCRUB 1 APPLICATION(S): 0.04 SOLUTION TOPICAL at 05:12

## 2024-01-01 RX ADMIN — FENTANYL CITRATE 50 MICROGRAM(S): 50 INJECTION INTRAMUSCULAR; INTRAVENOUS at 19:01

## 2024-01-01 RX ADMIN — Medication 25 MILLILITER(S): at 00:13

## 2024-01-01 RX ADMIN — Medication 6: at 16:38

## 2024-01-01 RX ADMIN — ALBUMIN HUMAN 125 MILLILITER(S): 50 SOLUTION INTRAVENOUS at 16:00

## 2024-01-01 RX ADMIN — OXYCODONE HYDROCHLORIDE 10 MILLIGRAM(S): 30 TABLET ORAL at 13:13

## 2024-01-01 RX ADMIN — TAMSULOSIN HYDROCHLORIDE 0.4 MILLIGRAM(S): 0.4 CAPSULE ORAL at 21:04

## 2024-01-01 RX ADMIN — SUCRALFATE 1 GRAM(S): 1 SUSPENSION ORAL at 21:17

## 2024-01-01 RX ADMIN — BUMETANIDE 2 MILLIGRAM(S): 2 TABLET ORAL at 23:08

## 2024-01-01 RX ADMIN — AMIODARONE HYDROCHLORIDE 600 MILLIGRAM(S): 50 INJECTION, SOLUTION INTRAVENOUS at 15:40

## 2024-01-01 RX ADMIN — Medication 4.5 UNIT(S)/MIN: at 19:29

## 2024-01-01 RX ADMIN — BUDESONIDE 0.5 MILLIGRAM(S): 9 TABLET, EXTENDED RELEASE ORAL at 05:33

## 2024-01-01 RX ADMIN — DILTIAZEM HYDROCHLORIDE 10 MILLIGRAM(S): 60 TABLET ORAL at 10:26

## 2024-01-01 RX ADMIN — SODIUM CHLORIDE 2000 MILLILITER(S): 9 INJECTION, SOLUTION INTRAVENOUS at 10:15

## 2024-01-01 RX ADMIN — LEVETIRACETAM 250 MILLIGRAM(S): 750 TABLET, FILM COATED ORAL at 03:18

## 2024-01-01 RX ADMIN — Medication 2: at 17:36

## 2024-01-01 RX ADMIN — MUPIROCIN 1 APPLICATION(S): 2 CREAM TOPICAL at 06:00

## 2024-01-01 RX ADMIN — Medication 50 MILLIGRAM(S): at 20:38

## 2024-01-01 RX ADMIN — Medication 1 MILLIGRAM(S): at 05:14

## 2024-01-01 RX ADMIN — OXYCODONE HYDROCHLORIDE 2.5 MILLIGRAM(S): 30 TABLET ORAL at 02:15

## 2024-01-01 RX ADMIN — DILTIAZEM HYDROCHLORIDE 10 MILLIGRAM(S): 60 TABLET ORAL at 08:29

## 2024-01-01 RX ADMIN — Medication 200 GRAM(S): at 14:42

## 2024-01-01 RX ADMIN — Medication 4: at 11:25

## 2024-01-01 RX ADMIN — Medication 2.81 MICROGRAM(S)/KG/MIN: at 19:32

## 2024-01-01 RX ADMIN — PREDNISONE 20 MILLIGRAM(S): 5 TABLET ORAL at 05:23

## 2024-01-01 RX ADMIN — Medication 6 UNIT(S): at 18:25

## 2024-01-01 RX ADMIN — OXYCODONE HYDROCHLORIDE 5 MILLIGRAM(S): 30 TABLET ORAL at 02:15

## 2024-01-01 RX ADMIN — Medication 100 MILLIGRAM(S): at 20:45

## 2024-01-01 RX ADMIN — Medication 12.5 MILLIGRAM(S): at 05:24

## 2024-01-01 RX ADMIN — VALGANCICLOVIR HYDROCHLORIDE 450 MILLIGRAM(S): 50 POWDER, FOR SOLUTION ORAL at 11:32

## 2024-01-01 RX ADMIN — OXYCODONE HYDROCHLORIDE 10 MILLIGRAM(S): 30 TABLET ORAL at 20:30

## 2024-01-01 RX ADMIN — DEXMEDETOMIDINE HYDROCHLORIDE 11.7 MICROGRAM(S)/KG/HR: 4 INJECTION, SOLUTION INTRAVENOUS at 03:05

## 2024-01-01 RX ADMIN — DEXMEDETOMIDINE HYDROCHLORIDE 23.4 MICROGRAM(S)/KG/HR: 4 INJECTION, SOLUTION INTRAVENOUS at 15:00

## 2024-01-01 RX ADMIN — CYCLOSPORINE 25 MILLIGRAM(S): 100 CAPSULE, GELATIN COATED ORAL at 07:48

## 2024-01-01 RX ADMIN — OXYCODONE HYDROCHLORIDE 2.5 MILLIGRAM(S): 30 TABLET ORAL at 06:11

## 2024-01-01 RX ADMIN — Medication 2: at 06:35

## 2024-01-01 RX ADMIN — NYSTATIN 1 APPLICATION(S): 100000 POWDER TOPICAL at 06:11

## 2024-01-01 RX ADMIN — FENTANYL CITRATE 25 MICROGRAM(S): 50 INJECTION INTRAMUSCULAR; INTRAVENOUS at 10:10

## 2024-01-01 RX ADMIN — HYDROMORPHONE HYDROCHLORIDE 1 MILLIGRAM(S): 4 INJECTION, SOLUTION INTRAMUSCULAR; INTRAVENOUS; SUBCUTANEOUS at 10:04

## 2024-01-01 RX ADMIN — VALGANCICLOVIR HYDROCHLORIDE 450 MILLIGRAM(S): 50 POWDER, FOR SOLUTION ORAL at 12:04

## 2024-01-01 RX ADMIN — ANTISEPTIC SURGICAL SCRUB 1 APPLICATION(S): 0.04 SOLUTION TOPICAL at 07:01

## 2024-01-01 RX ADMIN — CYCLOSPORINE 25 MILLIGRAM(S): 100 CAPSULE, GELATIN COATED ORAL at 09:47

## 2024-01-01 RX ADMIN — Medication 5 MILLIGRAM(S): at 19:52

## 2024-01-01 RX ADMIN — POTASSIUM CHLORIDE 100 MILLIEQUIVALENT(S): 750 TABLET, EXTENDED RELEASE ORAL at 10:37

## 2024-01-01 RX ADMIN — Medication 250 MILLIGRAM(S): at 11:25

## 2024-01-01 RX ADMIN — URSODIOL 300 MILLIGRAM(S): 250 TABLET, FILM COATED ORAL at 18:11

## 2024-01-01 RX ADMIN — ANTISEPTIC SURGICAL SCRUB 1 APPLICATION(S): 0.04 SOLUTION TOPICAL at 05:14

## 2024-01-01 RX ADMIN — ATORVASTATIN CALCIUM 80 MILLIGRAM(S): 80 TABLET, FILM COATED ORAL at 21:24

## 2024-01-01 RX ADMIN — SODIUM CHLORIDE 30 MILLILITER(S): 9 INJECTION, SOLUTION INTRAVENOUS at 14:56

## 2024-01-01 RX ADMIN — Medication 50 MILLIGRAM(S): at 14:34

## 2024-01-01 RX ADMIN — NYSTATIN 1 APPLICATION(S): 100000 POWDER TOPICAL at 05:22

## 2024-01-01 RX ADMIN — HYDROMORPHONE HYDROCHLORIDE 0.5 MILLIGRAM(S): 4 INJECTION, SOLUTION INTRAMUSCULAR; INTRAVENOUS; SUBCUTANEOUS at 02:30

## 2024-01-01 RX ADMIN — Medication 5000 UNIT(S): at 18:06

## 2024-01-01 RX ADMIN — Medication 12.5 MILLIGRAM(S): at 22:28

## 2024-01-01 RX ADMIN — POTASSIUM CHLORIDE 100 MILLIEQUIVALENT(S): 750 TABLET, EXTENDED RELEASE ORAL at 23:42

## 2024-01-01 RX ADMIN — DIPHENHYDRAMINE HYDROCHLORIDE AND LIDOCAINE HYDROCHLORIDE AND ALUMINUM HYDROXIDE AND MAGNESIUM HYDRO 10 MILLILITER(S): KIT at 15:59

## 2024-01-01 RX ADMIN — SULFAMETHOXAZOLE AND TRIMETHOPRIM 80 MILLIGRAM(S): 400; 80 TABLET ORAL at 11:16

## 2024-01-01 RX ADMIN — HYDROMORPHONE HYDROCHLORIDE 1 MILLIGRAM(S): 4 INJECTION, SOLUTION INTRAMUSCULAR; INTRAVENOUS; SUBCUTANEOUS at 17:15

## 2024-01-01 RX ADMIN — ATOVAQUONE 1500 MILLIGRAM(S): 750 SUSPENSION ORAL at 11:05

## 2024-01-01 RX ADMIN — ANTISEPTIC SURGICAL SCRUB 15 MILLILITER(S): 0.04 SOLUTION TOPICAL at 17:07

## 2024-01-01 RX ADMIN — NOREPINEPHRINE BITARTRATE 8.78 MICROGRAM(S)/KG/MIN: 1 INJECTION, SOLUTION, CONCENTRATE INTRAVENOUS at 08:09

## 2024-01-01 RX ADMIN — HYDROMORPHONE HYDROCHLORIDE 1 MILLIGRAM(S): 4 INJECTION, SOLUTION INTRAMUSCULAR; INTRAVENOUS; SUBCUTANEOUS at 09:31

## 2024-01-01 RX ADMIN — LINEZOLID 300 MILLIGRAM(S): 600 INJECTION, SOLUTION INTRAVENOUS at 05:33

## 2024-01-01 RX ADMIN — Medication 2: at 05:16

## 2024-01-01 RX ADMIN — Medication 20 MILLILITER(S): at 06:00

## 2024-01-01 RX ADMIN — ANTISEPTIC SURGICAL SCRUB 1 APPLICATION(S): 0.04 SOLUTION TOPICAL at 05:18

## 2024-01-01 RX ADMIN — DEXMEDETOMIDINE HYDROCHLORIDE 35.1 MICROGRAM(S)/KG/HR: 4 INJECTION, SOLUTION INTRAVENOUS at 08:45

## 2024-01-01 RX ADMIN — ANTISEPTIC SURGICAL SCRUB 15 MILLILITER(S): 0.04 SOLUTION TOPICAL at 06:37

## 2024-01-01 RX ADMIN — SODIUM CHLORIDE 30 MILLILITER(S): 9 INJECTION, SOLUTION INTRAVENOUS at 19:55

## 2024-01-01 RX ADMIN — ROCURONIUM BROMIDE 100 MILLIGRAM(S): 10 INJECTION INTRAVENOUS at 18:23

## 2024-01-01 RX ADMIN — ASPIRIN 81 MILLIGRAM(S): 81 TABLET, COATED ORAL at 11:45

## 2024-01-01 RX ADMIN — MUPIROCIN 1 APPLICATION(S): 2 CREAM TOPICAL at 18:06

## 2024-01-01 RX ADMIN — DIPHENHYDRAMINE HYDROCHLORIDE AND LIDOCAINE HYDROCHLORIDE AND ALUMINUM HYDROXIDE AND MAGNESIUM HYDRO 5 MILLILITER(S): KIT at 18:17

## 2024-01-01 RX ADMIN — Medication 4: at 05:40

## 2024-01-01 RX ADMIN — POTASSIUM CHLORIDE 100 MILLIEQUIVALENT(S): 750 TABLET, EXTENDED RELEASE ORAL at 21:53

## 2024-01-01 RX ADMIN — BUDESONIDE 0.5 MILLIGRAM(S): 9 TABLET, EXTENDED RELEASE ORAL at 05:51

## 2024-01-01 RX ADMIN — BUDESONIDE 0.5 MILLIGRAM(S): 9 TABLET, EXTENDED RELEASE ORAL at 05:04

## 2024-01-01 RX ADMIN — Medication 5000 UNIT(S): at 05:05

## 2024-01-01 RX ADMIN — OXYCODONE HYDROCHLORIDE 10 MILLIGRAM(S): 30 TABLET ORAL at 10:04

## 2024-01-01 RX ADMIN — ASPIRIN 81 MILLIGRAM(S): 81 TABLET, COATED ORAL at 14:01

## 2024-01-01 RX ADMIN — MEROPENEM 100 MILLIGRAM(S): 500 INJECTION INTRAVENOUS at 21:14

## 2024-01-01 RX ADMIN — Medication 100 MILLIGRAM(S): at 18:45

## 2024-01-01 RX ADMIN — MEROPENEM 100 MILLIGRAM(S): 500 INJECTION INTRAVENOUS at 21:04

## 2024-01-01 RX ADMIN — OXYCODONE HYDROCHLORIDE 2.5 MILLIGRAM(S): 30 TABLET ORAL at 12:40

## 2024-01-01 RX ADMIN — MEROPENEM 100 MILLIGRAM(S): 500 INJECTION INTRAVENOUS at 17:45

## 2024-01-01 RX ADMIN — Medication 6: at 05:23

## 2024-01-01 RX ADMIN — POLYETHYLENE GLYCOL 3350 17 GRAM(S): 17 POWDER, FOR SOLUTION ORAL at 05:23

## 2024-01-01 RX ADMIN — SODIUM BICARBONATE 50 MILLIEQUIVALENT(S): 42 INJECTION, SOLUTION INTRAVENOUS at 11:25

## 2024-01-01 RX ADMIN — CYCLOSPORINE 25 MILLIGRAM(S): 100 CAPSULE, GELATIN COATED ORAL at 20:21

## 2024-01-01 RX ADMIN — LINEZOLID 300 MILLIGRAM(S): 600 INJECTION, SOLUTION INTRAVENOUS at 15:36

## 2024-01-01 RX ADMIN — Medication 100 MILLIGRAM(S): at 17:45

## 2024-01-01 RX ADMIN — Medication 10 UNIT(S)/HR: at 20:28

## 2024-01-01 RX ADMIN — Medication 50 MILLIGRAM(S): at 21:17

## 2024-01-01 RX ADMIN — ALBUMIN HUMAN 50 MILLILITER(S): 50 SOLUTION INTRAVENOUS at 19:20

## 2024-01-01 RX ADMIN — PROPOFOL 5.62 MICROGRAM(S)/KG/MIN: 10 INJECTION, EMULSION INTRAVENOUS at 13:59

## 2024-01-01 RX ADMIN — ALBUMIN HUMAN 50 MILLILITER(S): 50 SOLUTION INTRAVENOUS at 18:43

## 2024-01-01 RX ADMIN — QUETIAPINE FUMARATE 12.5 MILLIGRAM(S): 300 TABLET ORAL at 11:32

## 2024-01-01 RX ADMIN — MEROPENEM 100 MILLIGRAM(S): 500 INJECTION INTRAVENOUS at 13:58

## 2024-01-01 RX ADMIN — PANTOPRAZOLE 10 MG/HR: 20 TABLET, DELAYED RELEASE ORAL at 07:35

## 2024-01-01 RX ADMIN — PANTOPRAZOLE 40 MILLIGRAM(S): 20 TABLET, DELAYED RELEASE ORAL at 11:30

## 2024-01-01 RX ADMIN — BUDESONIDE 0.5 MILLIGRAM(S): 9 TABLET, EXTENDED RELEASE ORAL at 17:28

## 2024-01-01 RX ADMIN — Medication 5000 UNIT(S): at 17:12

## 2024-01-01 RX ADMIN — Medication 5000 UNIT(S): at 18:25

## 2024-01-01 RX ADMIN — PROPOFOL 2.81 MICROGRAM(S)/KG/MIN: 10 INJECTION, EMULSION INTRAVENOUS at 06:22

## 2024-01-01 RX ADMIN — ANTISEPTIC SURGICAL SCRUB 1 APPLICATION(S): 0.04 SOLUTION TOPICAL at 05:02

## 2024-01-01 RX ADMIN — OXYCODONE HYDROCHLORIDE 10 MILLIGRAM(S): 30 TABLET ORAL at 03:55

## 2024-01-01 RX ADMIN — BUDESONIDE 0.5 MILLIGRAM(S): 9 TABLET, EXTENDED RELEASE ORAL at 17:13

## 2024-01-01 RX ADMIN — Medication 4: at 05:49

## 2024-01-01 RX ADMIN — Medication 100 MILLIGRAM(S): at 08:36

## 2024-01-01 RX ADMIN — POTASSIUM CHLORIDE 100 MILLIEQUIVALENT(S): 750 TABLET, EXTENDED RELEASE ORAL at 02:58

## 2024-01-01 RX ADMIN — POTASSIUM CHLORIDE 100 MILLIEQUIVALENT(S): 750 TABLET, EXTENDED RELEASE ORAL at 07:48

## 2024-01-01 RX ADMIN — Medication 4.5 UNIT(S)/MIN: at 12:45

## 2024-01-01 RX ADMIN — Medication 2 MILLIGRAM(S): at 22:05

## 2024-01-01 RX ADMIN — Medication 2 MILLIGRAM(S): at 00:44

## 2024-01-01 RX ADMIN — Medication 6 UNIT(S): at 16:38

## 2024-01-01 RX ADMIN — Medication 25 MILLIGRAM(S): at 17:02

## 2024-01-01 RX ADMIN — Medication 2: at 12:08

## 2024-01-01 RX ADMIN — NYSTATIN 1 APPLICATION(S): 100000 POWDER TOPICAL at 17:06

## 2024-01-01 RX ADMIN — Medication 2: at 21:17

## 2024-01-01 RX ADMIN — IPRATROPIUM BROMIDE AND ALBUTEROL SULFATE 3 MILLILITER(S): .5; 2.5 SOLUTION RESPIRATORY (INHALATION) at 11:04

## 2024-01-01 RX ADMIN — ACETAMINOPHEN 500 MILLIGRAM(S): 160 SUSPENSION ORAL at 09:48

## 2024-01-01 RX ADMIN — PANTOPRAZOLE 40 MILLIGRAM(S): 20 TABLET, DELAYED RELEASE ORAL at 11:02

## 2024-01-01 RX ADMIN — PHENYLEPHRINE HYDROCHLORIDE 1.87 MICROGRAM(S)/KG/MIN: 10 INJECTION INTRAVENOUS at 08:16

## 2024-01-01 RX ADMIN — Medication 50 MILLIGRAM(S): at 05:03

## 2024-01-01 RX ADMIN — LEVETIRACETAM 250 MILLIGRAM(S): 750 TABLET, FILM COATED ORAL at 02:47

## 2024-01-01 RX ADMIN — METHYLNALTREXONE BROMIDE 12 MILLIGRAM(S): 12 INJECTION, SOLUTION SUBCUTANEOUS at 11:08

## 2024-01-01 RX ADMIN — Medication 10 MILLILITER(S): at 22:30

## 2024-01-01 RX ADMIN — Medication 5 MILLIGRAM(S): at 11:46

## 2024-01-01 RX ADMIN — OXYCODONE HYDROCHLORIDE 5 MILLIGRAM(S): 30 TABLET ORAL at 05:30

## 2024-01-01 RX ADMIN — NIFEDIPINE 60 MILLIGRAM(S): 90 TABLET, FILM COATED, EXTENDED RELEASE ORAL at 05:14

## 2024-01-01 RX ADMIN — ATOVAQUONE 1500 MILLIGRAM(S): 750 SUSPENSION ORAL at 13:06

## 2024-01-01 RX ADMIN — OXYCODONE HYDROCHLORIDE 10 MILLIGRAM(S): 30 TABLET ORAL at 19:30

## 2024-01-01 RX ADMIN — PANTOPRAZOLE 40 MILLIGRAM(S): 20 TABLET, DELAYED RELEASE ORAL at 11:25

## 2024-01-01 RX ADMIN — Medication 200 GRAM(S): at 14:30

## 2024-01-01 RX ADMIN — Medication 5000 UNIT(S): at 17:15

## 2024-01-01 RX ADMIN — Medication 4 MILLILITER(S): at 05:03

## 2024-01-01 RX ADMIN — CYCLOSPORINE 25 MILLIGRAM(S): 100 CAPSULE, GELATIN COATED ORAL at 20:16

## 2024-01-01 RX ADMIN — HYDROMORPHONE HYDROCHLORIDE 1 MILLIGRAM(S): 4 INJECTION, SOLUTION INTRAMUSCULAR; INTRAVENOUS; SUBCUTANEOUS at 22:29

## 2024-01-01 RX ADMIN — PANTOPRAZOLE 40 MILLIGRAM(S): 20 TABLET, DELAYED RELEASE ORAL at 12:41

## 2024-01-01 RX ADMIN — Medication 4 MILLILITER(S): at 17:46

## 2024-01-01 RX ADMIN — BUDESONIDE 0.5 MILLIGRAM(S): 9 TABLET, EXTENDED RELEASE ORAL at 05:35

## 2024-01-01 RX ADMIN — Medication 9 UNIT(S)/MIN: at 10:27

## 2024-01-01 RX ADMIN — IPRATROPIUM BROMIDE AND ALBUTEROL SULFATE 3 MILLILITER(S): .5; 2.5 SOLUTION RESPIRATORY (INHALATION) at 11:45

## 2024-01-01 RX ADMIN — OXYCODONE HYDROCHLORIDE 10 MILLIGRAM(S): 30 TABLET ORAL at 10:45

## 2024-01-01 RX ADMIN — OXYCODONE HYDROCHLORIDE 10 MILLIGRAM(S): 30 TABLET ORAL at 07:28

## 2024-01-01 RX ADMIN — POLYETHYLENE GLYCOL 3350 17 GRAM(S): 17 POWDER, FOR SOLUTION ORAL at 19:42

## 2024-01-01 RX ADMIN — BASILIXIMAB 100 MILLIGRAM(S): 20 INJECTION, POWDER, FOR SOLUTION INTRAVENOUS at 09:20

## 2024-01-01 RX ADMIN — Medication 4 MILLILITER(S): at 11:10

## 2024-01-01 RX ADMIN — NOREPINEPHRINE BITARTRATE 8.78 MICROGRAM(S)/KG/MIN: 1 INJECTION, SOLUTION, CONCENTRATE INTRAVENOUS at 11:39

## 2024-01-01 RX ADMIN — CYCLOSPORINE 75 MILLIGRAM(S): 100 CAPSULE, GELATIN COATED ORAL at 10:37

## 2024-01-01 RX ADMIN — OXYCODONE HYDROCHLORIDE 5 MILLIGRAM(S): 30 TABLET ORAL at 12:30

## 2024-01-01 RX ADMIN — POTASSIUM CHLORIDE 100 MILLIEQUIVALENT(S): 750 TABLET, EXTENDED RELEASE ORAL at 21:11

## 2024-01-01 RX ADMIN — Medication 255 MILLIMOLE(S): at 22:25

## 2024-01-01 RX ADMIN — LINEZOLID 300 MILLIGRAM(S): 600 INJECTION, SOLUTION INTRAVENOUS at 17:13

## 2024-01-01 RX ADMIN — SODIUM CHLORIDE 30 MILLILITER(S): 9 INJECTION, SOLUTION INTRAVENOUS at 07:51

## 2024-01-01 RX ADMIN — SULFAMETHOXAZOLE AND TRIMETHOPRIM 266.25 MILLIGRAM(S): 400; 80 TABLET ORAL at 18:04

## 2024-01-01 RX ADMIN — NYSTATIN 1 APPLICATION(S): 100000 POWDER TOPICAL at 05:26

## 2024-01-01 RX ADMIN — MEROPENEM 100 MILLIGRAM(S): 500 INJECTION INTRAVENOUS at 08:00

## 2024-01-01 RX ADMIN — URSODIOL 300 MILLIGRAM(S): 250 TABLET, FILM COATED ORAL at 17:17

## 2024-01-01 RX ADMIN — SODIUM BICARBONATE 1300 MILLIGRAM(S): 42 INJECTION, SOLUTION INTRAVENOUS at 05:07

## 2024-01-01 RX ADMIN — IPRATROPIUM BROMIDE AND ALBUTEROL SULFATE 3 MILLILITER(S): .5; 2.5 SOLUTION RESPIRATORY (INHALATION) at 23:16

## 2024-01-01 RX ADMIN — Medication 5000 UNIT(S): at 18:26

## 2024-01-01 RX ADMIN — OXYCODONE HYDROCHLORIDE 2.5 MILLIGRAM(S): 30 TABLET ORAL at 06:28

## 2024-01-01 RX ADMIN — CASPOFUNGIN ACETATE 250 MILLIGRAM(S): 5 INJECTION, POWDER, LYOPHILIZED, FOR SOLUTION INTRAVENOUS at 14:04

## 2024-01-01 RX ADMIN — Medication 100 MILLIGRAM(S): at 17:07

## 2024-01-01 RX ADMIN — CASPOFUNGIN ACETATE 250 MILLIGRAM(S): 5 INJECTION, POWDER, LYOPHILIZED, FOR SOLUTION INTRAVENOUS at 14:44

## 2024-01-01 RX ADMIN — OXYCODONE HYDROCHLORIDE 5 MILLIGRAM(S): 30 TABLET ORAL at 11:27

## 2024-01-01 RX ADMIN — ACETAMINOPHEN 500 MILLIGRAM(S): 160 SUSPENSION ORAL at 23:00

## 2024-01-01 RX ADMIN — PREDNISONE 20 MILLIGRAM(S): 5 TABLET ORAL at 06:13

## 2024-01-01 RX ADMIN — Medication 1 TABLET(S): at 05:16

## 2024-01-01 RX ADMIN — Medication 5 MILLIGRAM(S): at 09:34

## 2024-01-01 RX ADMIN — NYSTATIN 1 APPLICATION(S): 100000 POWDER TOPICAL at 05:01

## 2024-01-01 RX ADMIN — Medication 4.5 UNIT(S)/MIN: at 19:58

## 2024-01-01 RX ADMIN — PANTOPRAZOLE 40 MILLIGRAM(S): 20 TABLET, DELAYED RELEASE ORAL at 06:00

## 2024-01-01 RX ADMIN — Medication 12 UNIT(S): at 11:56

## 2024-01-01 RX ADMIN — BUDESONIDE 0.5 MILLIGRAM(S): 9 TABLET, EXTENDED RELEASE ORAL at 17:53

## 2024-01-01 RX ADMIN — INSULIN GLARGINE-YFGN 3 UNIT(S): 100 INJECTION, SOLUTION SUBCUTANEOUS at 23:13

## 2024-01-01 RX ADMIN — ALBUMIN HUMAN 125 MILLILITER(S): 50 SOLUTION INTRAVENOUS at 21:24

## 2024-01-01 RX ADMIN — ATOVAQUONE 1500 MILLIGRAM(S): 750 SUSPENSION ORAL at 11:30

## 2024-01-01 RX ADMIN — CASPOFUNGIN ACETATE 70 MILLIGRAM(S): 5 INJECTION, POWDER, LYOPHILIZED, FOR SOLUTION INTRAVENOUS at 14:43

## 2024-01-01 RX ADMIN — Medication 6: at 16:49

## 2024-01-01 RX ADMIN — Medication 100 MILLILITER(S): at 12:08

## 2024-01-01 RX ADMIN — SULFAMETHOXAZOLE AND TRIMETHOPRIM 261.88 MILLIGRAM(S): 400; 80 TABLET ORAL at 02:48

## 2024-01-01 RX ADMIN — Medication 5000 UNIT(S): at 17:21

## 2024-01-01 RX ADMIN — Medication 28.1 MICROGRAM(S)/KG/MIN: at 19:56

## 2024-01-01 RX ADMIN — DEXMEDETOMIDINE HYDROCHLORIDE 23.4 MICROGRAM(S)/KG/HR: 4 INJECTION, SOLUTION INTRAVENOUS at 19:39

## 2024-01-01 RX ADMIN — MIDODRINE HYDROCHLORIDE 10 MILLIGRAM(S): 5 TABLET ORAL at 13:56

## 2024-01-01 RX ADMIN — Medication 100 MILLILITER(S): at 19:50

## 2024-01-01 RX ADMIN — Medication 100 MILLIGRAM(S): at 02:19

## 2024-01-01 RX ADMIN — OXYCODONE HYDROCHLORIDE 2.5 MILLIGRAM(S): 30 TABLET ORAL at 05:11

## 2024-01-01 RX ADMIN — NYSTATIN 1 APPLICATION(S): 100000 POWDER TOPICAL at 15:44

## 2024-01-01 RX ADMIN — Medication 12 UNIT(S): at 00:02

## 2024-01-01 RX ADMIN — CYCLOSPORINE 50 MILLIGRAM(S): 100 CAPSULE, GELATIN COATED ORAL at 19:52

## 2024-01-01 RX ADMIN — LINEZOLID 300 MILLIGRAM(S): 600 INJECTION, SOLUTION INTRAVENOUS at 05:16

## 2024-01-01 RX ADMIN — OXYCODONE HYDROCHLORIDE 10 MILLIGRAM(S): 30 TABLET ORAL at 20:00

## 2024-01-01 RX ADMIN — ALBUMIN HUMAN 250 MILLILITER(S): 50 SOLUTION INTRAVENOUS at 03:22

## 2024-01-01 RX ADMIN — BUDESONIDE 0.5 MILLIGRAM(S): 9 TABLET, EXTENDED RELEASE ORAL at 05:10

## 2024-01-01 RX ADMIN — ANTISEPTIC SURGICAL SCRUB 1 APPLICATION(S): 0.04 SOLUTION TOPICAL at 05:59

## 2024-01-01 RX ADMIN — Medication 4.5 UNIT(S)/MIN: at 21:55

## 2024-01-01 RX ADMIN — ALBUMIN HUMAN 1000 MILLILITER(S): 50 SOLUTION INTRAVENOUS at 02:34

## 2024-01-01 RX ADMIN — Medication 1 UNIT(S)/HR: at 15:00

## 2024-01-01 RX ADMIN — MYCOPHENOLATE MOFETIL 1000 MILLIGRAM(S): 200 POWDER, FOR SUSPENSION ORAL at 09:34

## 2024-01-01 RX ADMIN — MEROPENEM 100 MILLIGRAM(S): 500 INJECTION INTRAVENOUS at 05:01

## 2024-01-01 RX ADMIN — Medication 50 GRAM(S): at 05:04

## 2024-01-01 RX ADMIN — Medication 12.5 MILLIGRAM(S): at 05:16

## 2024-01-01 RX ADMIN — HYDROMORPHONE HYDROCHLORIDE 0.5 MILLIGRAM(S): 4 INJECTION, SOLUTION INTRAMUSCULAR; INTRAVENOUS; SUBCUTANEOUS at 20:00

## 2024-01-01 RX ADMIN — ALBUMIN HUMAN 50 MILLILITER(S): 50 SOLUTION INTRAVENOUS at 11:22

## 2024-01-01 RX ADMIN — VALGANCICLOVIR HYDROCHLORIDE 450 MILLIGRAM(S): 50 POWDER, FOR SOLUTION ORAL at 12:00

## 2024-01-01 RX ADMIN — OXYCODONE HYDROCHLORIDE 5 MILLIGRAM(S): 30 TABLET ORAL at 21:30

## 2024-01-01 RX ADMIN — Medication 100 MILLIGRAM(S): at 18:17

## 2024-01-01 RX ADMIN — ALBUMIN HUMAN 50 MILLILITER(S): 50 SOLUTION INTRAVENOUS at 09:24

## 2024-01-01 RX ADMIN — Medication 1 TABLET(S): at 17:21

## 2024-01-01 RX ADMIN — DEXMEDETOMIDINE HYDROCHLORIDE 35.1 MICROGRAM(S)/KG/HR: 4 INJECTION, SOLUTION INTRAVENOUS at 10:46

## 2024-01-01 RX ADMIN — IPRATROPIUM BROMIDE AND ALBUTEROL SULFATE 3 MILLILITER(S): .5; 2.5 SOLUTION RESPIRATORY (INHALATION) at 05:08

## 2024-01-01 RX ADMIN — Medication 4 MILLIGRAM(S): at 18:23

## 2024-01-01 RX ADMIN — ANTISEPTIC SURGICAL SCRUB 15 MILLILITER(S): 0.04 SOLUTION TOPICAL at 05:18

## 2024-01-01 RX ADMIN — Medication 4.5 UNIT(S)/MIN: at 08:23

## 2024-01-01 RX ADMIN — SULFAMETHOXAZOLE AND TRIMETHOPRIM 210 MILLIGRAM(S): 400; 80 TABLET ORAL at 12:02

## 2024-01-01 RX ADMIN — ANTISEPTIC SURGICAL SCRUB 1 APPLICATION(S): 0.04 SOLUTION TOPICAL at 05:04

## 2024-01-01 RX ADMIN — Medication 5 MILLIGRAM(S): at 14:11

## 2024-01-01 RX ADMIN — IPRATROPIUM BROMIDE AND ALBUTEROL SULFATE 3 MILLILITER(S): .5; 2.5 SOLUTION RESPIRATORY (INHALATION) at 17:13

## 2024-01-01 RX ADMIN — Medication 1 MILLIGRAM(S): at 20:44

## 2024-01-01 RX ADMIN — Medication 50 MILLIGRAM(S): at 15:37

## 2024-01-01 RX ADMIN — POTASSIUM CHLORIDE 40 MILLIEQUIVALENT(S): 750 TABLET, EXTENDED RELEASE ORAL at 21:16

## 2024-01-01 RX ADMIN — MEROPENEM 100 MILLIGRAM(S): 500 INJECTION INTRAVENOUS at 18:17

## 2024-01-01 RX ADMIN — Medication 100 MILLIGRAM(S): at 05:59

## 2024-01-01 RX ADMIN — ANTISEPTIC SURGICAL SCRUB 15 MILLILITER(S): 0.04 SOLUTION TOPICAL at 17:26

## 2024-01-01 RX ADMIN — HYDROMORPHONE HYDROCHLORIDE 0.5 MILLIGRAM(S): 4 INJECTION, SOLUTION INTRAMUSCULAR; INTRAVENOUS; SUBCUTANEOUS at 07:20

## 2024-01-01 RX ADMIN — ANTISEPTIC SURGICAL SCRUB 15 MILLILITER(S): 0.04 SOLUTION TOPICAL at 07:00

## 2024-01-01 RX ADMIN — Medication 100 MILLIGRAM(S): at 05:22

## 2024-01-01 RX ADMIN — Medication 50 MILLIGRAM(S): at 05:43

## 2024-01-01 RX ADMIN — SODIUM CHLORIDE 50 MILLILITER(S): 9 INJECTION, SOLUTION INTRAVENOUS at 18:30

## 2024-01-01 RX ADMIN — FENTANYL CITRATE 25 MICROGRAM(S): 50 INJECTION INTRAMUSCULAR; INTRAVENOUS at 19:00

## 2024-01-01 RX ADMIN — Medication 4 MILLILITER(S): at 05:02

## 2024-01-01 RX ADMIN — Medication 100 MILLIGRAM(S): at 01:12

## 2024-01-01 RX ADMIN — FLUCONAZOLE 100 MILLIGRAM(S): 100 TABLET ORAL at 11:05

## 2024-01-01 RX ADMIN — FLUCONAZOLE 100 MILLIGRAM(S): 100 TABLET ORAL at 11:19

## 2024-01-01 RX ADMIN — SODIUM BICARBONATE 50 MILLIEQUIVALENT(S): 42 INJECTION, SOLUTION INTRAVENOUS at 21:11

## 2024-01-01 RX ADMIN — Medication 6 UNIT(S): at 01:39

## 2024-01-01 RX ADMIN — Medication 100 MILLIGRAM(S): at 02:46

## 2024-01-01 RX ADMIN — FENTANYL CITRATE 1 PATCH: 50 INJECTION INTRAMUSCULAR; INTRAVENOUS at 19:18

## 2024-01-01 RX ADMIN — Medication 100 MILLILITER(S): at 05:15

## 2024-01-01 RX ADMIN — DEXMEDETOMIDINE HYDROCHLORIDE 35.1 MICROGRAM(S)/KG/HR: 4 INJECTION, SOLUTION INTRAVENOUS at 02:11

## 2024-01-01 RX ADMIN — Medication 0.5 MILLIGRAM(S): at 06:26

## 2024-01-01 RX ADMIN — ANTISEPTIC SURGICAL SCRUB 1 APPLICATION(S): 0.04 SOLUTION TOPICAL at 05:45

## 2024-01-01 RX ADMIN — CASPOFUNGIN ACETATE 250 MILLIGRAM(S): 5 INJECTION, POWDER, LYOPHILIZED, FOR SOLUTION INTRAVENOUS at 13:54

## 2024-01-01 RX ADMIN — PIPERACILLIN SODIUM AND TAZOBACTAM SODIUM 25 GRAM(S): 2; 250 INJECTION, POWDER, FOR SOLUTION INTRAVENOUS at 13:17

## 2024-01-01 RX ADMIN — MYCOPHENOLATE MOFETIL 500 MILLIGRAM(S): 200 POWDER, FOR SUSPENSION ORAL at 20:59

## 2024-01-01 RX ADMIN — Medication 10 UNIT(S): at 11:25

## 2024-01-01 RX ADMIN — ACETAMINOPHEN, DIPHENHYDRAMINE HCL, PHENYLEPHRINE HCL 5 MILLIGRAM(S): 325; 25; 5 TABLET ORAL at 21:30

## 2024-01-01 RX ADMIN — Medication 2 MILLIGRAM(S): at 22:59

## 2024-01-01 RX ADMIN — OXYCODONE HYDROCHLORIDE 5 MILLIGRAM(S): 30 TABLET ORAL at 22:09

## 2024-01-01 RX ADMIN — Medication 50 MILLIGRAM(S): at 05:00

## 2024-01-01 RX ADMIN — CYCLOSPORINE 50 MILLIGRAM(S): 100 CAPSULE, GELATIN COATED ORAL at 07:55

## 2024-01-01 RX ADMIN — OXYCODONE HYDROCHLORIDE 10 MILLIGRAM(S): 30 TABLET ORAL at 01:00

## 2024-01-01 RX ADMIN — ASPIRIN 81 MILLIGRAM(S): 81 TABLET, COATED ORAL at 11:40

## 2024-01-01 RX ADMIN — SULFAMETHOXAZOLE AND TRIMETHOPRIM 210 MILLIGRAM(S): 400; 80 TABLET ORAL at 11:19

## 2024-01-01 RX ADMIN — ALBUMIN HUMAN 1000 MILLILITER(S): 50 SOLUTION INTRAVENOUS at 17:16

## 2024-01-01 RX ADMIN — Medication 9 UNIT(S): at 12:01

## 2024-01-01 RX ADMIN — PANTOPRAZOLE 40 MILLIGRAM(S): 20 TABLET, DELAYED RELEASE ORAL at 12:13

## 2024-01-01 RX ADMIN — IPRATROPIUM BROMIDE AND ALBUTEROL SULFATE 3 MILLILITER(S): .5; 2.5 SOLUTION RESPIRATORY (INHALATION) at 23:05

## 2024-01-01 RX ADMIN — OXYCODONE HYDROCHLORIDE 5 MILLIGRAM(S): 30 TABLET ORAL at 23:35

## 2024-01-01 RX ADMIN — Medication 9 UNIT(S)/MIN: at 18:23

## 2024-01-01 RX ADMIN — HYDROMORPHONE HYDROCHLORIDE 0.5 MILLIGRAM(S): 4 INJECTION, SOLUTION INTRAMUSCULAR; INTRAVENOUS; SUBCUTANEOUS at 19:01

## 2024-01-01 RX ADMIN — Medication 50 MILLIGRAM(S): at 05:01

## 2024-01-01 RX ADMIN — OXYCODONE HYDROCHLORIDE 5 MILLIGRAM(S): 30 TABLET ORAL at 23:00

## 2024-01-01 RX ADMIN — PANTOPRAZOLE 40 MILLIGRAM(S): 20 TABLET, DELAYED RELEASE ORAL at 11:19

## 2024-01-01 RX ADMIN — Medication 1 MILLIGRAM(S): at 08:21

## 2024-01-01 RX ADMIN — Medication 50 MILLIGRAM(S): at 13:05

## 2024-01-01 RX ADMIN — ASPIRIN 300 MILLIGRAM(S): 81 TABLET, COATED ORAL at 12:03

## 2024-01-01 RX ADMIN — Medication 200 GRAM(S): at 03:38

## 2024-01-01 RX ADMIN — MEROPENEM 100 MILLIGRAM(S): 500 INJECTION INTRAVENOUS at 05:12

## 2024-01-01 RX ADMIN — OXYCODONE HYDROCHLORIDE 5 MILLIGRAM(S): 30 TABLET ORAL at 07:50

## 2024-01-01 RX ADMIN — GANCICLOVIR 100 MILLIGRAM(S): 250 CAPSULE ORAL at 17:38

## 2024-01-01 RX ADMIN — MEROPENEM 100 MILLIGRAM(S): 500 INJECTION INTRAVENOUS at 17:25

## 2024-01-01 RX ADMIN — Medication 200 GRAM(S): at 07:50

## 2024-01-01 RX ADMIN — Medication 5000 UNIT(S): at 17:17

## 2024-01-01 RX ADMIN — LIDOCAINE HYDROCHLORIDE 1 PATCH: 30 CREAM TOPICAL at 07:16

## 2024-01-01 RX ADMIN — FENTANYL CITRATE 25 MICROGRAM(S): 50 INJECTION INTRAMUSCULAR; INTRAVENOUS at 16:50

## 2024-01-01 RX ADMIN — HYDROMORPHONE HYDROCHLORIDE 1 MILLIGRAM(S): 4 INJECTION, SOLUTION INTRAMUSCULAR; INTRAVENOUS; SUBCUTANEOUS at 13:40

## 2024-01-01 RX ADMIN — METHYLNALTREXONE BROMIDE 12 MILLIGRAM(S): 12 INJECTION, SOLUTION SUBCUTANEOUS at 11:56

## 2024-01-01 RX ADMIN — OXYCODONE HYDROCHLORIDE 10 MILLIGRAM(S): 30 TABLET ORAL at 05:36

## 2024-01-01 RX ADMIN — ANTISEPTIC SURGICAL SCRUB 1 APPLICATION(S): 0.04 SOLUTION TOPICAL at 05:52

## 2024-01-01 RX ADMIN — BUDESONIDE 0.5 MILLIGRAM(S): 9 TABLET, EXTENDED RELEASE ORAL at 05:49

## 2024-01-01 RX ADMIN — Medication 80 MILLIGRAM(S): at 05:21

## 2024-01-01 RX ADMIN — Medication 100 MILLIGRAM(S): at 17:05

## 2024-01-01 RX ADMIN — LIDOCAINE HYDROCHLORIDE 1 PATCH: 30 CREAM TOPICAL at 02:51

## 2024-01-01 RX ADMIN — LIDOCAINE HYDROCHLORIDE 1 PATCH: 30 CREAM TOPICAL at 11:54

## 2024-01-01 RX ADMIN — Medication 9 UNIT(S): at 06:23

## 2024-01-01 RX ADMIN — Medication 30 MILLILITER(S): at 20:40

## 2024-01-01 RX ADMIN — Medication 14 UNIT(S): at 07:51

## 2024-01-01 RX ADMIN — AMIODARONE HYDROCHLORIDE 8.33 MG/MIN: 50 INJECTION, SOLUTION INTRAVENOUS at 17:22

## 2024-01-01 RX ADMIN — Medication 8: at 12:06

## 2024-01-01 RX ADMIN — Medication 12.5 MILLIGRAM(S): at 14:40

## 2024-01-01 RX ADMIN — Medication 4.5 UNIT(S)/MIN: at 13:29

## 2024-01-01 RX ADMIN — Medication 4.5 UNIT(S)/MIN: at 00:56

## 2024-01-01 RX ADMIN — Medication 32 MILLIGRAM(S): at 11:20

## 2024-01-01 RX ADMIN — Medication 4: at 17:33

## 2024-01-01 RX ADMIN — TOBRAMYCIN 232.5 MILLIGRAM(S): 40 INJECTION INTRAMUSCULAR; INTRAVENOUS at 14:31

## 2024-01-01 RX ADMIN — Medication 100 MILLIGRAM(S): at 18:42

## 2024-01-01 RX ADMIN — Medication 1 MILLIGRAM(S): at 05:33

## 2024-01-01 RX ADMIN — TAMSULOSIN HYDROCHLORIDE 0.4 MILLIGRAM(S): 0.4 CAPSULE ORAL at 22:59

## 2024-01-01 RX ADMIN — ACETAMINOPHEN 500 MILLIGRAM(S): 160 SUSPENSION ORAL at 05:05

## 2024-01-01 RX ADMIN — MEROPENEM 100 MILLIGRAM(S): 500 INJECTION INTRAVENOUS at 20:48

## 2024-01-01 RX ADMIN — IPRATROPIUM BROMIDE AND ALBUTEROL SULFATE 3 MILLILITER(S): .5; 2.5 SOLUTION RESPIRATORY (INHALATION) at 06:23

## 2024-01-01 RX ADMIN — CASPOFUNGIN ACETATE 250 MILLIGRAM(S): 5 INJECTION, POWDER, LYOPHILIZED, FOR SOLUTION INTRAVENOUS at 14:03

## 2024-01-01 RX ADMIN — Medication 50 MILLIGRAM(S): at 05:05

## 2024-01-01 RX ADMIN — SUGAMMADEX 200 MILLIGRAM(S): 100 INJECTION, SOLUTION INTRAVENOUS at 23:30

## 2024-01-01 RX ADMIN — ANTISEPTIC SURGICAL SCRUB 15 MILLILITER(S): 0.04 SOLUTION TOPICAL at 06:01

## 2024-01-01 RX ADMIN — BUDESONIDE 0.5 MILLIGRAM(S): 9 TABLET, EXTENDED RELEASE ORAL at 17:04

## 2024-01-01 RX ADMIN — Medication 1 APPLICATION(S): at 12:36

## 2024-01-01 RX ADMIN — CYCLOSPORINE 50 MILLIGRAM(S): 100 CAPSULE, GELATIN COATED ORAL at 19:51

## 2024-01-01 RX ADMIN — Medication 1 TABLET(S): at 17:50

## 2024-01-01 RX ADMIN — Medication 6 UNIT(S): at 18:11

## 2024-01-01 RX ADMIN — NYSTATIN 1 APPLICATION(S): 100000 POWDER TOPICAL at 22:34

## 2024-01-01 RX ADMIN — DILTIAZEM HYDROCHLORIDE 10 MILLIGRAM(S): 60 TABLET ORAL at 03:35

## 2024-01-01 RX ADMIN — NIFEDIPINE 60 MILLIGRAM(S): 90 TABLET, FILM COATED, EXTENDED RELEASE ORAL at 05:33

## 2024-01-01 RX ADMIN — AMPICILLIN SODIUM AND SULBACTAM SODIUM 200 GRAM(S): 2; 1 INJECTION, POWDER, FOR SOLUTION INTRAMUSCULAR; INTRAVENOUS at 05:04

## 2024-01-01 RX ADMIN — PREDNISONE 20 MILLIGRAM(S): 5 TABLET ORAL at 05:14

## 2024-01-01 RX ADMIN — MEROPENEM 100 MILLIGRAM(S): 500 INJECTION INTRAVENOUS at 21:20

## 2024-01-01 RX ADMIN — MIDODRINE HYDROCHLORIDE 5 MILLIGRAM(S): 5 TABLET ORAL at 17:06

## 2024-01-01 RX ADMIN — Medication 80 MILLIGRAM(S): at 13:06

## 2024-01-01 RX ADMIN — VANCOMYCIN HYDROCHLORIDE 250 MILLIGRAM(S): KIT at 09:53

## 2024-01-01 RX ADMIN — CYCLOSPORINE 50 MILLIGRAM(S): 100 CAPSULE, GELATIN COATED ORAL at 20:35

## 2024-01-01 RX ADMIN — TAMSULOSIN HYDROCHLORIDE 0.4 MILLIGRAM(S): 0.4 CAPSULE ORAL at 21:24

## 2024-01-01 RX ADMIN — Medication 14 MICROGRAM(S)/KG/MIN: at 12:36

## 2024-01-01 RX ADMIN — BUDESONIDE 0.5 MILLIGRAM(S): 9 TABLET, EXTENDED RELEASE ORAL at 05:57

## 2024-01-01 RX ADMIN — Medication 2: at 11:28

## 2024-01-01 RX ADMIN — DEXMEDETOMIDINE HYDROCHLORIDE 35.1 MICROGRAM(S)/KG/HR: 4 INJECTION, SOLUTION INTRAVENOUS at 01:44

## 2024-01-01 RX ADMIN — HYDROMORPHONE HYDROCHLORIDE 0.5 MILLIGRAM(S): 4 INJECTION, SOLUTION INTRAMUSCULAR; INTRAVENOUS; SUBCUTANEOUS at 06:00

## 2024-01-01 RX ADMIN — PANTOPRAZOLE 40 MILLIGRAM(S): 20 TABLET, DELAYED RELEASE ORAL at 23:20

## 2024-01-01 RX ADMIN — Medication 50 MILLIGRAM(S): at 05:23

## 2024-01-01 RX ADMIN — OXYCODONE HYDROCHLORIDE 10 MILLIGRAM(S): 30 TABLET ORAL at 15:45

## 2024-01-01 RX ADMIN — HYDROMORPHONE HYDROCHLORIDE 1 MILLIGRAM(S): 4 INJECTION, SOLUTION INTRAMUSCULAR; INTRAVENOUS; SUBCUTANEOUS at 21:57

## 2024-01-01 RX ADMIN — HYDROMORPHONE HYDROCHLORIDE 1 MILLIGRAM(S): 4 INJECTION, SOLUTION INTRAMUSCULAR; INTRAVENOUS; SUBCUTANEOUS at 19:34

## 2024-01-01 RX ADMIN — Medication 9 UNIT(S): at 06:30

## 2024-01-01 RX ADMIN — OXYCODONE HYDROCHLORIDE 10 MILLIGRAM(S): 30 TABLET ORAL at 06:00

## 2024-01-01 RX ADMIN — Medication 200 GRAM(S): at 06:07

## 2024-01-01 RX ADMIN — NYSTATIN 1 APPLICATION(S): 100000 POWDER TOPICAL at 17:07

## 2024-01-01 RX ADMIN — MEROPENEM 100 MILLIGRAM(S): 500 INJECTION INTRAVENOUS at 15:36

## 2024-01-01 RX ADMIN — Medication 9 UNIT(S): at 17:24

## 2024-01-01 RX ADMIN — PHYTONADIONE 102 MILLIGRAM(S): 2 INJECTION, EMULSION INTRAMUSCULAR; INTRAVENOUS; SUBCUTANEOUS at 14:43

## 2024-01-01 RX ADMIN — MEROPENEM 100 MILLIGRAM(S): 500 INJECTION INTRAVENOUS at 21:34

## 2024-01-01 RX ADMIN — Medication 200 GRAM(S): at 00:26

## 2024-01-01 RX ADMIN — BUDESONIDE 0.5 MILLIGRAM(S): 9 TABLET, EXTENDED RELEASE ORAL at 17:46

## 2024-01-01 RX ADMIN — OXYCODONE HYDROCHLORIDE 5 MILLIGRAM(S): 30 TABLET ORAL at 12:00

## 2024-01-01 RX ADMIN — Medication 6: at 06:14

## 2024-01-01 RX ADMIN — NYSTATIN 1 APPLICATION(S): 100000 POWDER TOPICAL at 17:30

## 2024-01-01 RX ADMIN — IPRATROPIUM BROMIDE AND ALBUTEROL SULFATE 3 MILLILITER(S): .5; 2.5 SOLUTION RESPIRATORY (INHALATION) at 23:02

## 2024-01-01 RX ADMIN — HYDROMORPHONE HYDROCHLORIDE 1 MILLIGRAM(S): 4 INJECTION, SOLUTION INTRAMUSCULAR; INTRAVENOUS; SUBCUTANEOUS at 04:30

## 2024-01-01 RX ADMIN — LIDOCAINE HYDROCHLORIDE 1 PATCH: 30 CREAM TOPICAL at 08:06

## 2024-01-01 RX ADMIN — Medication 4 MILLILITER(S): at 23:06

## 2024-01-01 RX ADMIN — ANTISEPTIC SURGICAL SCRUB 15 MILLILITER(S): 0.04 SOLUTION TOPICAL at 04:50

## 2024-01-01 RX ADMIN — Medication 2: at 00:10

## 2024-01-01 RX ADMIN — ANTISEPTIC SURGICAL SCRUB 15 MILLILITER(S): 0.04 SOLUTION TOPICAL at 17:21

## 2024-01-01 RX ADMIN — ATOVAQUONE 1500 MILLIGRAM(S): 750 SUSPENSION ORAL at 11:59

## 2024-01-01 RX ADMIN — ANTISEPTIC SURGICAL SCRUB 1 APPLICATION(S): 0.04 SOLUTION TOPICAL at 06:37

## 2024-01-01 RX ADMIN — Medication 5 MILLIGRAM(S): at 02:59

## 2024-01-01 RX ADMIN — Medication 2: at 01:00

## 2024-01-01 RX ADMIN — Medication 50 MILLIGRAM(S): at 21:01

## 2024-01-01 RX ADMIN — LEVETIRACETAM 250 MILLIGRAM(S): 750 TABLET, FILM COATED ORAL at 13:05

## 2024-01-01 RX ADMIN — SODIUM BICARBONATE 50 MILLIEQUIVALENT(S): 42 INJECTION, SOLUTION INTRAVENOUS at 18:20

## 2024-01-01 RX ADMIN — PREDNISONE 20 MILLIGRAM(S): 5 TABLET ORAL at 05:51

## 2024-01-01 RX ADMIN — DEXMEDETOMIDINE HYDROCHLORIDE 0.7 MICROGRAM(S)/KG/HR: 4 INJECTION, SOLUTION INTRAVENOUS at 11:45

## 2024-01-01 RX ADMIN — Medication 16 MILLIGRAM(S): at 12:01

## 2024-01-01 RX ADMIN — NYSTATIN 1 APPLICATION(S): 100000 POWDER TOPICAL at 22:36

## 2024-01-01 RX ADMIN — OXYCODONE HYDROCHLORIDE 5 MILLIGRAM(S): 30 TABLET ORAL at 11:15

## 2024-01-01 RX ADMIN — ALBUMIN HUMAN 1000 MILLILITER(S): 50 SOLUTION INTRAVENOUS at 07:29

## 2024-01-01 RX ADMIN — CYCLOSPORINE 25 MILLIGRAM(S): 100 CAPSULE, GELATIN COATED ORAL at 10:49

## 2024-01-01 RX ADMIN — Medication 16 MILLIGRAM(S): at 11:42

## 2024-01-01 RX ADMIN — APIXABAN 2.5 MILLIGRAM(S): 5 TABLET, FILM COATED ORAL at 13:55

## 2024-01-01 RX ADMIN — BUDESONIDE 0.5 MILLIGRAM(S): 9 TABLET, EXTENDED RELEASE ORAL at 06:43

## 2024-01-01 RX ADMIN — IPRATROPIUM BROMIDE AND ALBUTEROL SULFATE 3 MILLILITER(S): .5; 2.5 SOLUTION RESPIRATORY (INHALATION) at 11:07

## 2024-01-01 RX ADMIN — CASPOFUNGIN ACETATE 250 MILLIGRAM(S): 5 INJECTION, POWDER, LYOPHILIZED, FOR SOLUTION INTRAVENOUS at 14:12

## 2024-01-01 RX ADMIN — POTASSIUM PHOSPHATE, MONOBASIC POTASSIUM PHOSPHATE, DIBASIC 83.33 MILLIMOLE(S): 236; 224 INJECTION, SOLUTION INTRAVENOUS at 05:25

## 2024-01-01 RX ADMIN — TACROLIMUS 0.5 MILLIGRAM(S): 1 CAPSULE, GELATIN COATED ORAL at 19:39

## 2024-01-01 RX ADMIN — IPRATROPIUM BROMIDE AND ALBUTEROL SULFATE 3 MILLILITER(S): .5; 2.5 SOLUTION RESPIRATORY (INHALATION) at 12:06

## 2024-01-01 RX ADMIN — PANTOPRAZOLE 40 MILLIGRAM(S): 20 TABLET, DELAYED RELEASE ORAL at 11:57

## 2024-01-01 RX ADMIN — Medication 100 MILLIGRAM(S): at 06:27

## 2024-01-01 RX ADMIN — ANTISEPTIC SURGICAL SCRUB 15 MILLILITER(S): 0.04 SOLUTION TOPICAL at 06:14

## 2024-01-01 RX ADMIN — ANTISEPTIC SURGICAL SCRUB 1 APPLICATION(S): 0.04 SOLUTION TOPICAL at 05:15

## 2024-01-01 RX ADMIN — Medication 1 TABLET(S): at 05:49

## 2024-01-01 RX ADMIN — ARIPIPRAZOLE 2 MILLIGRAM(S): 5 TABLET ORAL at 19:40

## 2024-01-01 RX ADMIN — ALBUMIN HUMAN 50 MILLILITER(S): 50 SOLUTION INTRAVENOUS at 00:39

## 2024-01-01 RX ADMIN — Medication 10 UNIT(S): at 08:09

## 2024-01-01 RX ADMIN — ANTISEPTIC SURGICAL SCRUB 15 MILLILITER(S): 0.04 SOLUTION TOPICAL at 17:29

## 2024-01-01 RX ADMIN — BUDESONIDE 0.5 MILLIGRAM(S): 9 TABLET, EXTENDED RELEASE ORAL at 17:11

## 2024-01-01 RX ADMIN — BUDESONIDE 0.5 MILLIGRAM(S): 9 TABLET, EXTENDED RELEASE ORAL at 17:33

## 2024-01-01 RX ADMIN — Medication 200 GRAM(S): at 03:42

## 2024-01-01 RX ADMIN — Medication 8 UNIT(S): at 12:17

## 2024-01-01 RX ADMIN — FENTANYL CITRATE 1 PATCH: 50 INJECTION INTRAMUSCULAR; INTRAVENOUS at 07:41

## 2024-01-01 RX ADMIN — Medication 2: at 06:13

## 2024-01-01 RX ADMIN — HYDROMORPHONE HYDROCHLORIDE 0.5 MILLIGRAM(S): 4 INJECTION, SOLUTION INTRAMUSCULAR; INTRAVENOUS; SUBCUTANEOUS at 00:42

## 2024-01-01 RX ADMIN — Medication 25 MILLIGRAM(S): at 03:54

## 2024-01-01 RX ADMIN — METHYLNALTREXONE BROMIDE 12 MILLIGRAM(S): 12 INJECTION, SOLUTION SUBCUTANEOUS at 13:55

## 2024-01-01 RX ADMIN — Medication 2: at 00:52

## 2024-01-01 RX ADMIN — ALBUMIN HUMAN 1000 MILLILITER(S): 50 SOLUTION INTRAVENOUS at 01:36

## 2024-01-01 RX ADMIN — ALBUMIN HUMAN 50 MILLILITER(S): 50 SOLUTION INTRAVENOUS at 08:49

## 2024-01-01 RX ADMIN — OXYCODONE HYDROCHLORIDE 10 MILLIGRAM(S): 30 TABLET ORAL at 12:58

## 2024-01-01 RX ADMIN — LEVETIRACETAM 400 MILLIGRAM(S): 750 TABLET, FILM COATED ORAL at 05:35

## 2024-01-01 RX ADMIN — MYCOPHENOLATE MOFETIL 500 MILLIGRAM(S): 200 POWDER, FOR SUSPENSION ORAL at 07:48

## 2024-01-01 RX ADMIN — Medication 5.62 MICROGRAM(S)/KG/MIN: at 19:39

## 2024-01-01 RX ADMIN — Medication 6: at 11:31

## 2024-01-01 RX ADMIN — BUDESONIDE 0.5 MILLIGRAM(S): 9 TABLET, EXTENDED RELEASE ORAL at 05:03

## 2024-01-01 RX ADMIN — CYCLOSPORINE 50 MILLIGRAM(S): 100 CAPSULE, GELATIN COATED ORAL at 20:17

## 2024-01-01 RX ADMIN — BUDESONIDE 0.5 MILLIGRAM(S): 9 TABLET, EXTENDED RELEASE ORAL at 05:36

## 2024-01-01 RX ADMIN — FENTANYL CITRATE 1 PATCH: 50 INJECTION INTRAMUSCULAR; INTRAVENOUS at 18:24

## 2024-01-01 RX ADMIN — FLUCONAZOLE 400 MILLIGRAM(S): 100 TABLET ORAL at 11:33

## 2024-01-01 RX ADMIN — LINEZOLID 300 MILLIGRAM(S): 600 INJECTION, SOLUTION INTRAVENOUS at 08:28

## 2024-01-01 RX ADMIN — MEROPENEM 100 MILLIGRAM(S): 500 INJECTION INTRAVENOUS at 05:32

## 2024-01-01 RX ADMIN — PANTOPRAZOLE 10 MG/HR: 20 TABLET, DELAYED RELEASE ORAL at 08:16

## 2024-01-01 RX ADMIN — Medication 5000 UNIT(S): at 05:01

## 2024-01-01 RX ADMIN — Medication 100 MILLIGRAM(S): at 17:08

## 2024-01-01 RX ADMIN — ANTISEPTIC SURGICAL SCRUB 15 MILLILITER(S): 0.04 SOLUTION TOPICAL at 17:24

## 2024-01-01 RX ADMIN — Medication 12 UNIT(S): at 18:36

## 2024-01-01 RX ADMIN — Medication 255 MILLIMOLE(S): at 06:28

## 2024-01-01 RX ADMIN — DESMOPRESSIN ACETATE 235 MICROGRAM(S): 4 INJECTION, SOLUTION INTRAVENOUS; SUBCUTANEOUS at 14:13

## 2024-01-01 RX ADMIN — IPRATROPIUM BROMIDE AND ALBUTEROL SULFATE 3 MILLILITER(S): .5; 2.5 SOLUTION RESPIRATORY (INHALATION) at 17:10

## 2024-01-01 RX ADMIN — CYCLOSPORINE 25 MILLIGRAM(S): 100 CAPSULE, GELATIN COATED ORAL at 14:49

## 2024-01-01 RX ADMIN — IPRATROPIUM BROMIDE AND ALBUTEROL SULFATE 3 MILLILITER(S): .5; 2.5 SOLUTION RESPIRATORY (INHALATION) at 17:06

## 2024-01-01 RX ADMIN — CYCLOSPORINE 25 MILLIGRAM(S): 100 CAPSULE, GELATIN COATED ORAL at 19:42

## 2024-01-01 RX ADMIN — ATORVASTATIN CALCIUM 80 MILLIGRAM(S): 80 TABLET, FILM COATED ORAL at 21:02

## 2024-01-01 RX ADMIN — Medication 50 MILLIGRAM(S): at 13:21

## 2024-01-01 RX ADMIN — PANTOPRAZOLE 40 MILLIGRAM(S): 20 TABLET, DELAYED RELEASE ORAL at 12:26

## 2024-01-01 RX ADMIN — ASPIRIN 81 MILLIGRAM(S): 81 TABLET, COATED ORAL at 14:41

## 2024-01-01 RX ADMIN — ANTISEPTIC SURGICAL SCRUB 1 APPLICATION(S): 0.04 SOLUTION TOPICAL at 18:23

## 2024-01-01 RX ADMIN — MEROPENEM 100 MILLIGRAM(S): 500 INJECTION INTRAVENOUS at 05:17

## 2024-01-01 RX ADMIN — OXYCODONE HYDROCHLORIDE 5 MILLIGRAM(S): 30 TABLET ORAL at 22:00

## 2024-01-01 RX ADMIN — Medication 14 UNIT(S): at 12:09

## 2024-01-01 RX ADMIN — ASPIRIN 300 MILLIGRAM(S): 81 TABLET, COATED ORAL at 12:39

## 2024-01-01 RX ADMIN — CYCLOSPORINE 50 MILLIGRAM(S): 100 CAPSULE, GELATIN COATED ORAL at 20:59

## 2024-01-01 RX ADMIN — BUDESONIDE 0.5 MILLIGRAM(S): 9 TABLET, EXTENDED RELEASE ORAL at 06:04

## 2024-01-01 RX ADMIN — Medication 9 UNIT(S)/MIN: at 19:49

## 2024-01-01 RX ADMIN — Medication 8 UNIT(S)/HR: at 19:45

## 2024-01-01 RX ADMIN — PANTOPRAZOLE 40 MILLIGRAM(S): 20 TABLET, DELAYED RELEASE ORAL at 19:46

## 2024-01-01 RX ADMIN — HYDROMORPHONE HYDROCHLORIDE 0.25 MILLIGRAM(S): 4 INJECTION, SOLUTION INTRAMUSCULAR; INTRAVENOUS; SUBCUTANEOUS at 20:52

## 2024-01-01 RX ADMIN — ANTISEPTIC SURGICAL SCRUB 15 MILLILITER(S): 0.04 SOLUTION TOPICAL at 17:55

## 2024-01-01 RX ADMIN — Medication 5 MILLIGRAM(S): at 01:09

## 2024-01-01 RX ADMIN — PANTOPRAZOLE 40 MILLIGRAM(S): 20 TABLET, DELAYED RELEASE ORAL at 23:22

## 2024-01-01 RX ADMIN — PHENYLEPHRINE HYDROCHLORIDE 70.2 MICROGRAM(S)/KG/MIN: 10 INJECTION INTRAVENOUS at 19:55

## 2024-01-01 RX ADMIN — Medication 85 MILLIMOLE(S): at 02:08

## 2024-01-01 RX ADMIN — OXYCODONE HYDROCHLORIDE 5 MILLIGRAM(S): 30 TABLET ORAL at 11:30

## 2024-01-01 RX ADMIN — PANTOPRAZOLE 40 MILLIGRAM(S): 20 TABLET, DELAYED RELEASE ORAL at 12:14

## 2024-01-01 RX ADMIN — Medication 50 MILLIGRAM(S): at 05:07

## 2024-01-01 RX ADMIN — FENTANYL CITRATE 50 MICROGRAM(S): 50 INJECTION INTRAMUSCULAR; INTRAVENOUS at 14:00

## 2024-01-01 RX ADMIN — Medication 25 GRAM(S): at 09:53

## 2024-01-01 RX ADMIN — VALGANCICLOVIR HYDROCHLORIDE 450 MILLIGRAM(S): 50 POWDER, FOR SOLUTION ORAL at 11:24

## 2024-01-01 RX ADMIN — Medication 200 GRAM(S): at 00:29

## 2024-01-01 RX ADMIN — NOREPINEPHRINE BITARTRATE 8.78 MICROGRAM(S)/KG/MIN: 1 INJECTION, SOLUTION, CONCENTRATE INTRAVENOUS at 14:05

## 2024-01-01 RX ADMIN — SODIUM BICARBONATE 50 MILLIEQUIVALENT(S): 42 INJECTION, SOLUTION INTRAVENOUS at 06:59

## 2024-01-01 RX ADMIN — OXYCODONE HYDROCHLORIDE 5 MILLIGRAM(S): 30 TABLET ORAL at 22:30

## 2024-01-01 RX ADMIN — Medication 4 MILLILITER(S): at 17:17

## 2024-01-01 RX ADMIN — CYCLOSPORINE 25 MILLIGRAM(S): 100 CAPSULE, GELATIN COATED ORAL at 15:55

## 2024-01-01 RX ADMIN — OXYCODONE HYDROCHLORIDE 5 MILLIGRAM(S): 30 TABLET ORAL at 05:31

## 2024-01-01 RX ADMIN — ANTISEPTIC SURGICAL SCRUB 15 MILLILITER(S): 0.04 SOLUTION TOPICAL at 18:10

## 2024-01-01 RX ADMIN — ALBUMIN HUMAN 1000 MILLILITER(S): 50 SOLUTION INTRAVENOUS at 21:08

## 2024-01-01 RX ADMIN — AMIODARONE HYDROCHLORIDE 16.7 MG/MIN: 50 INJECTION, SOLUTION INTRAVENOUS at 22:29

## 2024-01-01 RX ADMIN — Medication 250 MICROGRAM(S): at 01:47

## 2024-01-01 RX ADMIN — LEVETIRACETAM 400 MILLIGRAM(S): 750 TABLET, FILM COATED ORAL at 17:22

## 2024-01-01 RX ADMIN — Medication 2: at 03:06

## 2024-01-01 RX ADMIN — SODIUM BICARBONATE 50 MILLIEQUIVALENT(S): 42 INJECTION, SOLUTION INTRAVENOUS at 01:12

## 2024-01-01 RX ADMIN — Medication 3 UNIT(S)/HR: at 00:16

## 2024-01-01 RX ADMIN — SODIUM CHLORIDE 30 MILLILITER(S): 9 INJECTION, SOLUTION INTRAVENOUS at 07:50

## 2024-01-01 RX ADMIN — Medication 80 MILLIGRAM(S): at 02:52

## 2024-01-01 RX ADMIN — MIDODRINE HYDROCHLORIDE 5 MILLIGRAM(S): 5 TABLET ORAL at 05:56

## 2024-01-01 RX ADMIN — Medication 4 MILLILITER(S): at 05:04

## 2024-01-01 RX ADMIN — GANCICLOVIR 100 MILLIGRAM(S): 250 CAPSULE ORAL at 15:40

## 2024-01-01 RX ADMIN — DEXMEDETOMIDINE HYDROCHLORIDE 35.1 MICROGRAM(S)/KG/HR: 4 INJECTION, SOLUTION INTRAVENOUS at 07:15

## 2024-01-01 RX ADMIN — Medication 50 MILLIGRAM(S): at 05:27

## 2024-01-01 RX ADMIN — Medication 2: at 11:31

## 2024-01-01 RX ADMIN — CYCLOSPORINE 25 MILLIGRAM(S): 100 CAPSULE, GELATIN COATED ORAL at 18:27

## 2024-01-01 RX ADMIN — TAMSULOSIN HYDROCHLORIDE 0.4 MILLIGRAM(S): 0.4 CAPSULE ORAL at 21:25

## 2024-01-01 RX ADMIN — Medication 40 MILLIGRAM(S): at 09:26

## 2024-01-01 RX ADMIN — Medication 8 UNIT(S): at 17:37

## 2024-01-01 RX ADMIN — FLUCONAZOLE 100 MILLIGRAM(S): 100 TABLET ORAL at 12:13

## 2024-01-01 RX ADMIN — PANTOPRAZOLE 40 MILLIGRAM(S): 20 TABLET, DELAYED RELEASE ORAL at 12:01

## 2024-01-01 RX ADMIN — Medication 50 MILLIGRAM(S): at 05:33

## 2024-01-01 RX ADMIN — ASPIRIN 81 MILLIGRAM(S): 81 TABLET, COATED ORAL at 11:56

## 2024-01-01 RX ADMIN — METHYLNALTREXONE BROMIDE 12 MILLIGRAM(S): 12 INJECTION, SOLUTION SUBCUTANEOUS at 12:09

## 2024-01-01 RX ADMIN — CYCLOSPORINE 25 MILLIGRAM(S): 100 CAPSULE, GELATIN COATED ORAL at 08:19

## 2024-01-01 RX ADMIN — Medication 2: at 11:20

## 2024-01-01 RX ADMIN — CYCLOSPORINE 25 MILLIGRAM(S): 100 CAPSULE, GELATIN COATED ORAL at 19:54

## 2024-01-01 RX ADMIN — Medication 4: at 08:19

## 2024-01-01 RX ADMIN — IPRATROPIUM BROMIDE AND ALBUTEROL SULFATE 3 MILLILITER(S): .5; 2.5 SOLUTION RESPIRATORY (INHALATION) at 05:49

## 2024-01-01 RX ADMIN — AMIODARONE HYDROCHLORIDE 16.7 MG/MIN: 50 INJECTION, SOLUTION INTRAVENOUS at 08:37

## 2024-01-01 RX ADMIN — PANTOPRAZOLE 40 MILLIGRAM(S): 20 TABLET, DELAYED RELEASE ORAL at 12:29

## 2024-01-01 RX ADMIN — Medication 100 MILLIGRAM(S): at 13:01

## 2024-01-01 RX ADMIN — AMIODARONE HYDROCHLORIDE 33.3 MG/MIN: 50 INJECTION, SOLUTION INTRAVENOUS at 19:49

## 2024-01-01 RX ADMIN — Medication 5000 UNIT(S): at 05:16

## 2024-01-01 RX ADMIN — MEROPENEM 100 MILLIGRAM(S): 500 INJECTION INTRAVENOUS at 05:47

## 2024-01-01 RX ADMIN — URSODIOL 300 MILLIGRAM(S): 250 TABLET, FILM COATED ORAL at 17:55

## 2024-01-01 RX ADMIN — LIDOCAINE HYDROCHLORIDE 1 PATCH: 30 CREAM TOPICAL at 15:30

## 2024-01-01 RX ADMIN — SODIUM CHLORIDE 2000 MILLILITER(S): 9 INJECTION, SOLUTION INTRAVENOUS at 13:06

## 2024-01-01 RX ADMIN — APIXABAN 5 MILLIGRAM(S): 5 TABLET, FILM COATED ORAL at 05:07

## 2024-01-01 RX ADMIN — Medication 4.5 UNIT(S)/MIN: at 07:45

## 2024-01-01 RX ADMIN — ANTISEPTIC SURGICAL SCRUB 15 MILLILITER(S): 0.04 SOLUTION TOPICAL at 17:13

## 2024-01-01 RX ADMIN — CYCLOSPORINE 75 MILLIGRAM(S): 100 CAPSULE, GELATIN COATED ORAL at 07:48

## 2024-01-01 RX ADMIN — ASPIRIN 81 MILLIGRAM(S): 81 TABLET, COATED ORAL at 13:02

## 2024-01-01 RX ADMIN — PREDNISONE 20 MILLIGRAM(S): 5 TABLET ORAL at 05:07

## 2024-01-01 RX ADMIN — ATORVASTATIN CALCIUM 80 MILLIGRAM(S): 80 TABLET, FILM COATED ORAL at 21:37

## 2024-01-01 RX ADMIN — OXYCODONE HYDROCHLORIDE 10 MILLIGRAM(S): 30 TABLET ORAL at 03:05

## 2024-01-01 RX ADMIN — Medication 5 MILLIGRAM(S): at 07:36

## 2024-01-01 RX ADMIN — Medication 4: at 05:05

## 2024-01-01 RX ADMIN — OXYCODONE HYDROCHLORIDE 5 MILLIGRAM(S): 30 TABLET ORAL at 02:20

## 2024-01-01 RX ADMIN — Medication 100 MILLIGRAM(S): at 18:39

## 2024-01-01 RX ADMIN — ARGATROBAN 0.72 MICROGRAM(S)/KG/MIN: 50 INJECTION, SOLUTION INTRAVENOUS at 12:27

## 2024-01-01 RX ADMIN — CYCLOSPORINE 25 MILLIGRAM(S): 100 CAPSULE, GELATIN COATED ORAL at 07:43

## 2024-01-01 RX ADMIN — CYCLOSPORINE 25 MILLIGRAM(S): 100 CAPSULE, GELATIN COATED ORAL at 20:32

## 2024-01-01 RX ADMIN — ALBUMIN HUMAN 50 MILLILITER(S): 50 SOLUTION INTRAVENOUS at 21:15

## 2024-01-01 RX ADMIN — PHENYLEPHRINE HYDROCHLORIDE 91.3 MICROGRAM(S)/KG/MIN: 10 INJECTION INTRAVENOUS at 22:06

## 2024-01-01 RX ADMIN — BUDESONIDE 0.5 MILLIGRAM(S): 9 TABLET, EXTENDED RELEASE ORAL at 17:06

## 2024-01-01 RX ADMIN — Medication 50 MILLIGRAM(S): at 06:58

## 2024-01-01 RX ADMIN — Medication 100 MILLIGRAM(S): at 12:39

## 2024-01-01 RX ADMIN — NYSTATIN 1 APPLICATION(S): 100000 POWDER TOPICAL at 21:17

## 2024-01-01 RX ADMIN — Medication 5 MILLIGRAM(S): at 23:23

## 2024-01-01 RX ADMIN — ANTISEPTIC SURGICAL SCRUB 1 APPLICATION(S): 0.04 SOLUTION TOPICAL at 04:50

## 2024-01-01 RX ADMIN — Medication 100 MILLIGRAM(S): at 19:18

## 2024-01-01 RX ADMIN — FENTANYL CITRATE 1 PATCH: 50 INJECTION INTRAMUSCULAR; INTRAVENOUS at 17:28

## 2024-01-01 RX ADMIN — Medication 5 MILLIGRAM(S): at 16:04

## 2024-01-01 RX ADMIN — ANTISEPTIC SURGICAL SCRUB 15 MILLILITER(S): 0.04 SOLUTION TOPICAL at 17:49

## 2024-01-01 RX ADMIN — FENTANYL CITRATE 1 PATCH: 50 INJECTION INTRAMUSCULAR; INTRAVENOUS at 07:15

## 2024-01-01 RX ADMIN — IPRATROPIUM BROMIDE AND ALBUTEROL SULFATE 3 MILLILITER(S): .5; 2.5 SOLUTION RESPIRATORY (INHALATION) at 23:43

## 2024-01-01 RX ADMIN — ANTISEPTIC SURGICAL SCRUB 15 MILLILITER(S): 0.04 SOLUTION TOPICAL at 17:16

## 2024-01-01 RX ADMIN — MUPIROCIN 1 APPLICATION(S): 2 CREAM TOPICAL at 18:19

## 2024-01-01 RX ADMIN — Medication 100 MILLIGRAM(S): at 19:54

## 2024-01-01 RX ADMIN — HYDROMORPHONE HYDROCHLORIDE 1 MILLIGRAM(S): 4 INJECTION, SOLUTION INTRAMUSCULAR; INTRAVENOUS; SUBCUTANEOUS at 08:00

## 2024-01-01 RX ADMIN — GANCICLOVIR 100 MILLIGRAM(S): 250 CAPSULE ORAL at 12:36

## 2024-01-01 RX ADMIN — POTASSIUM CHLORIDE 100 MILLIEQUIVALENT(S): 750 TABLET, EXTENDED RELEASE ORAL at 12:53

## 2024-01-01 RX ADMIN — HYDROMORPHONE HYDROCHLORIDE 1 MILLIGRAM(S): 4 INJECTION, SOLUTION INTRAMUSCULAR; INTRAVENOUS; SUBCUTANEOUS at 08:45

## 2024-01-01 RX ADMIN — HYDROMORPHONE HYDROCHLORIDE 0.5 MILLIGRAM(S): 4 INJECTION, SOLUTION INTRAMUSCULAR; INTRAVENOUS; SUBCUTANEOUS at 22:10

## 2024-01-01 RX ADMIN — OXYCODONE HYDROCHLORIDE 5 MILLIGRAM(S): 30 TABLET ORAL at 23:34

## 2024-01-01 RX ADMIN — ETOMIDATE 20 MILLIGRAM(S): 2 INJECTION, SOLUTION INTRAVENOUS at 18:23

## 2024-01-01 RX ADMIN — ANTISEPTIC SURGICAL SCRUB 15 MILLILITER(S): 0.04 SOLUTION TOPICAL at 05:14

## 2024-01-01 RX ADMIN — Medication 32 MILLIGRAM(S): at 12:36

## 2024-01-01 RX ADMIN — TAMSULOSIN HYDROCHLORIDE 0.4 MILLIGRAM(S): 0.4 CAPSULE ORAL at 22:34

## 2024-01-01 RX ADMIN — Medication 6 UNIT(S): at 05:40

## 2024-01-01 RX ADMIN — NOREPINEPHRINE BITARTRATE 9.33 MICROGRAM(S)/KG/MIN: 1 INJECTION, SOLUTION, CONCENTRATE INTRAVENOUS at 08:19

## 2024-01-01 RX ADMIN — PHENYLEPHRINE HYDROCHLORIDE 28 MICROGRAM(S)/KG/MIN: 10 INJECTION INTRAVENOUS at 00:56

## 2024-01-01 RX ADMIN — FLUCONAZOLE 100 MILLIGRAM(S): 100 TABLET ORAL at 11:24

## 2024-01-01 RX ADMIN — AMPICILLIN SODIUM AND SULBACTAM SODIUM 200 GRAM(S): 2; 1 INJECTION, POWDER, FOR SOLUTION INTRAMUSCULAR; INTRAVENOUS at 12:12

## 2024-01-01 RX ADMIN — BUDESONIDE 0.5 MILLIGRAM(S): 9 TABLET, EXTENDED RELEASE ORAL at 17:16

## 2024-01-01 RX ADMIN — FENTANYL CITRATE 25 MICROGRAM(S): 50 INJECTION INTRAMUSCULAR; INTRAVENOUS at 17:26

## 2024-01-01 RX ADMIN — ANTISEPTIC SURGICAL SCRUB 15 MILLILITER(S): 0.04 SOLUTION TOPICAL at 05:04

## 2024-01-01 RX ADMIN — Medication 2 TABLET(S): at 21:30

## 2024-01-01 RX ADMIN — Medication 5000 UNIT(S): at 05:44

## 2024-01-01 RX ADMIN — ALBUMIN HUMAN 50 MILLILITER(S): 50 SOLUTION INTRAVENOUS at 06:59

## 2024-01-01 RX ADMIN — ANTISEPTIC SURGICAL SCRUB 15 MILLILITER(S): 0.04 SOLUTION TOPICAL at 06:02

## 2024-01-01 RX ADMIN — Medication 50 MILLIGRAM(S): at 05:14

## 2024-01-01 RX ADMIN — Medication 6 UNIT(S): at 16:34

## 2024-01-01 RX ADMIN — CYCLOSPORINE 25 MILLIGRAM(S): 100 CAPSULE, GELATIN COATED ORAL at 20:40

## 2024-01-01 RX ADMIN — Medication 2: at 13:46

## 2024-01-01 RX ADMIN — Medication 5 MILLIGRAM(S): at 11:45

## 2024-01-01 RX ADMIN — FLUCONAZOLE 100 MILLIGRAM(S): 100 TABLET ORAL at 11:52

## 2024-01-01 RX ADMIN — Medication 15 UNIT(S)/MIN: at 22:16

## 2024-01-01 RX ADMIN — Medication 3 UNIT(S)/HR: at 12:00

## 2024-01-01 RX ADMIN — ASPIRIN 81 MILLIGRAM(S): 81 TABLET, COATED ORAL at 11:09

## 2024-01-01 RX ADMIN — Medication 5000 UNIT(S): at 17:26

## 2024-01-01 RX ADMIN — Medication 50 MILLIGRAM(S): at 19:41

## 2024-01-01 RX ADMIN — HYDROMORPHONE HYDROCHLORIDE 1 MILLIGRAM(S): 4 INJECTION, SOLUTION INTRAMUSCULAR; INTRAVENOUS; SUBCUTANEOUS at 19:36

## 2024-01-01 RX ADMIN — Medication 200 GRAM(S): at 23:22

## 2024-01-01 RX ADMIN — Medication 125 MICROGRAM(S): at 12:08

## 2024-01-01 RX ADMIN — Medication 4 MILLILITER(S): at 17:06

## 2024-01-01 RX ADMIN — PANTOPRAZOLE 10 MG/HR: 20 TABLET, DELAYED RELEASE ORAL at 19:41

## 2024-01-01 RX ADMIN — PREDNISONE 20 MILLIGRAM(S): 5 TABLET ORAL at 05:26

## 2024-01-01 RX ADMIN — BUDESONIDE 0.5 MILLIGRAM(S): 9 TABLET, EXTENDED RELEASE ORAL at 05:30

## 2024-01-01 RX ADMIN — SODIUM BICARBONATE 50 MILLIEQUIVALENT(S): 42 INJECTION, SOLUTION INTRAVENOUS at 05:04

## 2024-01-01 RX ADMIN — POTASSIUM PHOSPHATE, MONOBASIC POTASSIUM PHOSPHATE, DIBASIC 83.33 MILLIMOLE(S): 236; 224 INJECTION, SOLUTION INTRAVENOUS at 07:51

## 2024-01-01 RX ADMIN — Medication 2: at 18:40

## 2024-01-01 RX ADMIN — NYSTATIN 1 APPLICATION(S): 100000 POWDER TOPICAL at 18:21

## 2024-01-01 RX ADMIN — BUDESONIDE 0.5 MILLIGRAM(S): 9 TABLET, EXTENDED RELEASE ORAL at 05:08

## 2024-01-01 RX ADMIN — Medication 125 MICROGRAM(S): at 09:16

## 2024-01-01 RX ADMIN — VANCOMYCIN HYDROCHLORIDE 250 MILLIGRAM(S): KIT at 10:02

## 2024-01-01 RX ADMIN — BUDESONIDE 0.5 MILLIGRAM(S): 9 TABLET, EXTENDED RELEASE ORAL at 17:14

## 2024-01-01 RX ADMIN — NIFEDIPINE 60 MILLIGRAM(S): 90 TABLET, FILM COATED, EXTENDED RELEASE ORAL at 05:03

## 2024-01-01 RX ADMIN — PROPOFOL 29.9 MICROGRAM(S)/KG/MIN: 10 INJECTION, EMULSION INTRAVENOUS at 12:00

## 2024-01-01 RX ADMIN — Medication 1.5 UNIT(S)/MIN: at 08:00

## 2024-01-01 RX ADMIN — Medication 5000 UNIT(S): at 05:14

## 2024-01-01 RX ADMIN — PHYTONADIONE 102 MILLIGRAM(S): 2 INJECTION, EMULSION INTRAMUSCULAR; INTRAVENOUS; SUBCUTANEOUS at 12:01

## 2024-01-01 RX ADMIN — MYCOPHENOLATE MOFETIL 1000 MILLIGRAM(S): 200 POWDER, FOR SUSPENSION ORAL at 19:33

## 2024-01-01 RX ADMIN — SODIUM CHLORIDE 30 MILLILITER(S): 9 INJECTION, SOLUTION INTRAVENOUS at 19:30

## 2024-01-01 RX ADMIN — POTASSIUM CHLORIDE 100 MILLIEQUIVALENT(S): 750 TABLET, EXTENDED RELEASE ORAL at 11:59

## 2024-01-01 RX ADMIN — ANTISEPTIC SURGICAL SCRUB 15 MILLILITER(S): 0.04 SOLUTION TOPICAL at 05:11

## 2024-01-01 RX ADMIN — Medication 200 GRAM(S): at 07:51

## 2024-01-01 RX ADMIN — Medication 2: at 13:05

## 2024-01-01 RX ADMIN — HYDROMORPHONE HYDROCHLORIDE 0.25 MILLIGRAM(S): 4 INJECTION, SOLUTION INTRAMUSCULAR; INTRAVENOUS; SUBCUTANEOUS at 05:59

## 2024-01-01 RX ADMIN — Medication 1 MILLIGRAM(S): at 22:16

## 2024-01-01 RX ADMIN — Medication 15 UNIT(S): at 23:12

## 2024-01-01 RX ADMIN — CASPOFUNGIN ACETATE 250 MILLIGRAM(S): 5 INJECTION, POWDER, LYOPHILIZED, FOR SOLUTION INTRAVENOUS at 14:41

## 2024-01-01 RX ADMIN — POTASSIUM CHLORIDE 100 MILLIEQUIVALENT(S): 750 TABLET, EXTENDED RELEASE ORAL at 20:09

## 2024-01-01 RX ADMIN — Medication 0.5 MILLIGRAM(S): at 19:29

## 2024-01-01 RX ADMIN — ACETAMINOPHEN 500 MILLIGRAM(S): 160 SUSPENSION ORAL at 03:08

## 2024-01-01 RX ADMIN — FLUCONAZOLE 100 MILLIGRAM(S): 100 TABLET ORAL at 12:59

## 2024-01-01 RX ADMIN — AMIODARONE HYDROCHLORIDE 16.7 MG/MIN: 50 INJECTION, SOLUTION INTRAVENOUS at 06:02

## 2024-01-01 RX ADMIN — Medication 100 MILLIGRAM(S): at 06:15

## 2024-01-01 RX ADMIN — Medication 20 MILLIGRAM(S): at 10:17

## 2024-01-01 RX ADMIN — Medication 0.5 MILLIGRAM(S): at 21:26

## 2024-01-01 RX ADMIN — Medication 100 MILLIGRAM(S): at 05:58

## 2024-01-01 RX ADMIN — Medication 4.5 UNIT(S)/MIN: at 20:07

## 2024-01-01 RX ADMIN — PROPOFOL 16.8 MICROGRAM(S)/KG/MIN: 10 INJECTION, EMULSION INTRAVENOUS at 07:50

## 2024-01-01 RX ADMIN — PANTOPRAZOLE 40 MILLIGRAM(S): 20 TABLET, DELAYED RELEASE ORAL at 11:39

## 2024-01-01 RX ADMIN — Medication 1 MILLIGRAM(S): at 13:57

## 2024-01-01 RX ADMIN — Medication 4.5 UNIT(S)/MIN: at 19:22

## 2024-01-01 RX ADMIN — Medication 1 ENEMA: at 22:17

## 2024-01-01 RX ADMIN — Medication 4 MILLILITER(S): at 17:50

## 2024-01-01 RX ADMIN — HYDROMORPHONE HYDROCHLORIDE 0.5 MILLIGRAM(S): 4 INJECTION, SOLUTION INTRAMUSCULAR; INTRAVENOUS; SUBCUTANEOUS at 20:15

## 2024-01-01 RX ADMIN — Medication 1 TABLET(S): at 05:04

## 2024-01-01 RX ADMIN — Medication 50 MILLIGRAM(S): at 05:02

## 2024-01-01 RX ADMIN — Medication 9 UNIT(S): at 00:04

## 2024-01-01 RX ADMIN — ACETAMINOPHEN 500 MILLIGRAM(S): 160 SUSPENSION ORAL at 09:34

## 2024-01-01 RX ADMIN — MEROPENEM 100 MILLIGRAM(S): 500 INJECTION INTRAVENOUS at 08:08

## 2024-01-01 RX ADMIN — ALBUMIN HUMAN 125 MILLILITER(S): 50 SOLUTION INTRAVENOUS at 20:00

## 2024-01-01 RX ADMIN — Medication 50 MILLIGRAM(S): at 05:13

## 2024-01-01 RX ADMIN — HYDROMORPHONE HYDROCHLORIDE 1 MILLIGRAM(S): 4 INJECTION, SOLUTION INTRAMUSCULAR; INTRAVENOUS; SUBCUTANEOUS at 00:20

## 2024-01-01 RX ADMIN — Medication 100 MILLIGRAM(S): at 06:02

## 2024-01-01 RX ADMIN — CASPOFUNGIN ACETATE 250 MILLIGRAM(S): 5 INJECTION, POWDER, LYOPHILIZED, FOR SOLUTION INTRAVENOUS at 12:34

## 2024-01-01 RX ADMIN — Medication 200 GRAM(S): at 17:10

## 2024-01-01 RX ADMIN — Medication 6 UNIT(S): at 23:27

## 2024-01-01 RX ADMIN — DEXMEDETOMIDINE HYDROCHLORIDE 4.68 MICROGRAM(S)/KG/HR: 4 INJECTION, SOLUTION INTRAVENOUS at 23:24

## 2024-01-01 RX ADMIN — Medication 4: at 05:25

## 2024-01-01 RX ADMIN — LEVETIRACETAM 400 MILLIGRAM(S): 750 TABLET, FILM COATED ORAL at 06:13

## 2024-01-01 RX ADMIN — Medication 9 UNIT(S): at 18:27

## 2024-01-01 RX ADMIN — IPRATROPIUM BROMIDE AND ALBUTEROL SULFATE 3 MILLILITER(S): .5; 2.5 SOLUTION RESPIRATORY (INHALATION) at 00:18

## 2024-01-01 RX ADMIN — Medication 32 MILLIGRAM(S): at 14:40

## 2024-01-01 RX ADMIN — BUDESONIDE 0.5 MILLIGRAM(S): 9 TABLET, EXTENDED RELEASE ORAL at 17:17

## 2024-01-01 RX ADMIN — IPRATROPIUM BROMIDE AND ALBUTEROL SULFATE 3 MILLILITER(S): .5; 2.5 SOLUTION RESPIRATORY (INHALATION) at 23:50

## 2024-01-01 RX ADMIN — Medication 5.62 MICROGRAM(S)/KG/MIN: at 19:51

## 2024-01-01 RX ADMIN — FENTANYL CITRATE 1 PATCH: 50 INJECTION INTRAMUSCULAR; INTRAVENOUS at 07:30

## 2024-01-01 RX ADMIN — MEROPENEM 100 MILLIGRAM(S): 500 INJECTION INTRAVENOUS at 21:03

## 2024-01-01 RX ADMIN — Medication 0.5 MILLIGRAM(S): at 02:54

## 2024-01-01 RX ADMIN — ALBUMIN HUMAN 250 MILLILITER(S): 50 SOLUTION INTRAVENOUS at 19:55

## 2024-01-01 RX ADMIN — URSODIOL 300 MILLIGRAM(S): 250 TABLET, FILM COATED ORAL at 05:14

## 2024-01-01 RX ADMIN — AMIODARONE HYDROCHLORIDE 16.7 MG/MIN: 50 INJECTION, SOLUTION INTRAVENOUS at 10:27

## 2024-01-01 RX ADMIN — HYDROMORPHONE HYDROCHLORIDE 0.5 MILLIGRAM(S): 4 INJECTION, SOLUTION INTRAMUSCULAR; INTRAVENOUS; SUBCUTANEOUS at 22:25

## 2024-01-01 RX ADMIN — Medication 2: at 01:51

## 2024-01-01 RX ADMIN — URSODIOL 300 MILLIGRAM(S): 250 TABLET, FILM COATED ORAL at 18:09

## 2024-01-01 RX ADMIN — Medication 4: at 12:12

## 2024-01-01 RX ADMIN — MEROPENEM 100 MILLIGRAM(S): 500 INJECTION INTRAVENOUS at 05:09

## 2024-01-01 RX ADMIN — SODIUM PHOSPHATE, DIBASIC, ANHYDROUS, POTASSIUM PHOSPHATE, MONOBASIC, AND SODIUM PHOSPHATE, MONOBASIC, MONOHYDRATE 2 TABLET(S): 852; 155; 130 TABLET, COATED ORAL at 01:02

## 2024-01-01 RX ADMIN — VANCOMYCIN HYDROCHLORIDE 250 MILLIGRAM(S): KIT at 10:28

## 2024-01-01 RX ADMIN — IPRATROPIUM BROMIDE AND ALBUTEROL SULFATE 3 MILLILITER(S): .5; 2.5 SOLUTION RESPIRATORY (INHALATION) at 23:29

## 2024-01-01 RX ADMIN — ACETAMINOPHEN 500 MILLIGRAM(S): 160 SUSPENSION ORAL at 00:39

## 2024-01-01 RX ADMIN — SULFAMETHOXAZOLE AND TRIMETHOPRIM 1 TABLET(S): 400; 80 TABLET ORAL at 12:00

## 2024-01-01 RX ADMIN — CYCLOSPORINE 25 MILLIGRAM(S): 100 CAPSULE, GELATIN COATED ORAL at 13:00

## 2024-01-01 RX ADMIN — ACETAMINOPHEN 500 MILLIGRAM(S): 160 SUSPENSION ORAL at 00:00

## 2024-01-01 RX ADMIN — Medication 100 MILLIGRAM(S): at 21:27

## 2024-01-01 RX ADMIN — Medication 4 UNIT(S): at 11:32

## 2024-01-01 RX ADMIN — SUGAMMADEX 200 MILLIGRAM(S): 100 INJECTION, SOLUTION INTRAVENOUS at 00:10

## 2024-01-01 RX ADMIN — ANTISEPTIC SURGICAL SCRUB 15 MILLILITER(S): 0.04 SOLUTION TOPICAL at 05:02

## 2024-01-01 RX ADMIN — Medication 1 UNIT(S)/HR: at 21:10

## 2024-01-01 RX ADMIN — ALBUMIN HUMAN 50 MILLILITER(S): 50 SOLUTION INTRAVENOUS at 17:11

## 2024-01-01 RX ADMIN — MEROPENEM 100 MILLIGRAM(S): 500 INJECTION INTRAVENOUS at 21:07

## 2024-01-01 RX ADMIN — CYCLOSPORINE 25 MILLIGRAM(S): 100 CAPSULE, GELATIN COATED ORAL at 14:41

## 2024-01-01 RX ADMIN — Medication 5 MILLIGRAM(S): at 00:46

## 2024-01-01 RX ADMIN — PANTOPRAZOLE 40 MILLIGRAM(S): 20 TABLET, DELAYED RELEASE ORAL at 07:16

## 2024-01-01 RX ADMIN — URSODIOL 300 MILLIGRAM(S): 250 TABLET, FILM COATED ORAL at 06:32

## 2024-01-01 RX ADMIN — LIDOCAINE HYDROCHLORIDE 1 PATCH: 30 CREAM TOPICAL at 05:07

## 2024-01-01 RX ADMIN — IPRATROPIUM BROMIDE AND ALBUTEROL SULFATE 3 MILLILITER(S): .5; 2.5 SOLUTION RESPIRATORY (INHALATION) at 17:27

## 2024-01-01 RX ADMIN — Medication 5 MILLIGRAM(S): at 02:30

## 2024-01-01 RX ADMIN — PHENYLEPHRINE HYDROCHLORIDE 1.87 MICROGRAM(S)/KG/MIN: 10 INJECTION INTRAVENOUS at 14:30

## 2024-01-01 RX ADMIN — Medication 16 MILLIGRAM(S): at 05:58

## 2024-01-01 RX ADMIN — ASPIRIN 81 MILLIGRAM(S): 81 TABLET, COATED ORAL at 11:02

## 2024-01-01 RX ADMIN — POTASSIUM CHLORIDE 100 MILLIEQUIVALENT(S): 750 TABLET, EXTENDED RELEASE ORAL at 22:46

## 2024-01-01 RX ADMIN — LEVETIRACETAM 250 MILLIGRAM(S): 750 TABLET, FILM COATED ORAL at 01:45

## 2024-01-01 RX ADMIN — ALBUMIN HUMAN 50 MILLILITER(S): 50 SOLUTION INTRAVENOUS at 06:00

## 2024-01-01 RX ADMIN — POLYETHYLENE GLYCOL 3350 17 GRAM(S): 17 POWDER, FOR SOLUTION ORAL at 11:32

## 2024-01-01 RX ADMIN — Medication 200 GRAM(S): at 00:21

## 2024-01-01 RX ADMIN — Medication 0.5 MILLIGRAM(S): at 17:25

## 2024-01-01 RX ADMIN — Medication 50 MILLIGRAM(S): at 17:11

## 2024-01-01 RX ADMIN — Medication 5 MILLIGRAM(S): at 20:00

## 2024-01-01 RX ADMIN — ALBUMIN HUMAN 125 MILLILITER(S): 50 SOLUTION INTRAVENOUS at 19:45

## 2024-01-01 RX ADMIN — Medication 2: at 18:10

## 2024-01-01 RX ADMIN — POLYETHYLENE GLYCOL 3350 17 GRAM(S): 17 POWDER, FOR SOLUTION ORAL at 05:26

## 2024-01-01 RX ADMIN — HYDROMORPHONE HYDROCHLORIDE 1 MILLIGRAM(S): 4 INJECTION, SOLUTION INTRAMUSCULAR; INTRAVENOUS; SUBCUTANEOUS at 06:54

## 2024-01-01 RX ADMIN — INSULIN GLARGINE-YFGN 18 UNIT(S): 100 INJECTION, SOLUTION SUBCUTANEOUS at 21:25

## 2024-01-01 RX ADMIN — CASPOFUNGIN ACETATE 250 MILLIGRAM(S): 5 INJECTION, POWDER, LYOPHILIZED, FOR SOLUTION INTRAVENOUS at 13:21

## 2024-01-01 RX ADMIN — ACETAMINOPHEN 500 MILLIGRAM(S): 160 SUSPENSION ORAL at 16:00

## 2024-01-01 RX ADMIN — Medication 6 UNIT(S): at 00:10

## 2024-01-01 RX ADMIN — Medication 100 MILLIGRAM(S): at 11:23

## 2024-01-01 RX ADMIN — NYSTATIN 1 APPLICATION(S): 100000 POWDER TOPICAL at 13:15

## 2024-01-01 RX ADMIN — Medication 200 GRAM(S): at 20:59

## 2024-01-01 RX ADMIN — BUDESONIDE 0.5 MILLIGRAM(S): 9 TABLET, EXTENDED RELEASE ORAL at 17:20

## 2024-01-01 RX ADMIN — Medication 100 MILLIGRAM(S): at 17:46

## 2024-01-01 RX ADMIN — Medication 5 MILLIGRAM(S): at 11:07

## 2024-01-01 RX ADMIN — SODIUM BICARBONATE 50 MILLIEQUIVALENT(S): 42 INJECTION, SOLUTION INTRAVENOUS at 12:39

## 2024-01-01 RX ADMIN — PHYTONADIONE 102 MILLIGRAM(S): 2 INJECTION, EMULSION INTRAMUSCULAR; INTRAVENOUS; SUBCUTANEOUS at 12:00

## 2024-01-01 RX ADMIN — LIDOCAINE HYDROCHLORIDE 1 PATCH: 30 CREAM TOPICAL at 04:43

## 2024-01-01 RX ADMIN — Medication 200 GRAM(S): at 14:56

## 2024-01-01 RX ADMIN — Medication 18 UNIT(S): at 19:02

## 2024-01-01 RX ADMIN — Medication 15 UNIT(S): at 05:45

## 2024-01-01 RX ADMIN — Medication 200 GRAM(S): at 17:29

## 2024-01-01 RX ADMIN — Medication 10 MILLILITER(S): at 22:17

## 2024-01-01 RX ADMIN — Medication 125 MICROGRAM(S): at 13:06

## 2024-01-01 RX ADMIN — VALGANCICLOVIR HYDROCHLORIDE 450 MILLIGRAM(S): 50 POWDER, FOR SOLUTION ORAL at 12:59

## 2024-01-01 RX ADMIN — PANTOPRAZOLE 40 MILLIGRAM(S): 20 TABLET, DELAYED RELEASE ORAL at 19:41

## 2024-01-01 RX ADMIN — VALGANCICLOVIR HYDROCHLORIDE 450 MILLIGRAM(S): 50 POWDER, FOR SOLUTION ORAL at 11:18

## 2024-01-01 RX ADMIN — HYDROMORPHONE HYDROCHLORIDE 1 MILLIGRAM(S): 4 INJECTION, SOLUTION INTRAMUSCULAR; INTRAVENOUS; SUBCUTANEOUS at 20:30

## 2024-01-01 RX ADMIN — LINEZOLID 300 MILLIGRAM(S): 600 INJECTION, SOLUTION INTRAVENOUS at 18:15

## 2024-01-01 RX ADMIN — Medication 16 MILLIGRAM(S): at 12:41

## 2024-01-01 RX ADMIN — HYDROMORPHONE HYDROCHLORIDE 0.5 MILLIGRAM(S): 4 INJECTION, SOLUTION INTRAMUSCULAR; INTRAVENOUS; SUBCUTANEOUS at 11:35

## 2024-01-01 RX ADMIN — Medication 12.5 MILLIGRAM(S): at 05:10

## 2024-01-01 RX ADMIN — ALBUMIN HUMAN 500 MILLILITER(S): 50 SOLUTION INTRAVENOUS at 00:53

## 2024-01-01 RX ADMIN — OXYCODONE HYDROCHLORIDE 5 MILLIGRAM(S): 30 TABLET ORAL at 07:35

## 2024-01-01 RX ADMIN — AMIODARONE HYDROCHLORIDE 33.3 MG/MIN: 50 INJECTION, SOLUTION INTRAVENOUS at 05:44

## 2024-01-01 RX ADMIN — Medication 2: at 12:37

## 2024-01-01 RX ADMIN — Medication 2: at 00:06

## 2024-01-01 RX ADMIN — HYDROMORPHONE HYDROCHLORIDE 1 MILLIGRAM(S): 4 INJECTION, SOLUTION INTRAMUSCULAR; INTRAVENOUS; SUBCUTANEOUS at 21:00

## 2024-01-01 RX ADMIN — PANTOPRAZOLE 40 MILLIGRAM(S): 20 TABLET, DELAYED RELEASE ORAL at 11:42

## 2024-01-01 RX ADMIN — ARGATROBAN 1.08 MICROGRAM(S)/KG/MIN: 50 INJECTION, SOLUTION INTRAVENOUS at 08:50

## 2024-01-01 RX ADMIN — FENTANYL CITRATE 1 PATCH: 50 INJECTION INTRAMUSCULAR; INTRAVENOUS at 18:00

## 2024-01-01 RX ADMIN — Medication 4 MILLILITER(S): at 06:22

## 2024-01-01 RX ADMIN — MEROPENEM 100 MILLIGRAM(S): 500 INJECTION INTRAVENOUS at 18:26

## 2024-01-01 RX ADMIN — Medication 4.5 UNIT(S)/MIN: at 19:54

## 2024-01-01 RX ADMIN — NOREPINEPHRINE BITARTRATE 1.76 MICROGRAM(S)/KG/MIN: 1 INJECTION, SOLUTION, CONCENTRATE INTRAVENOUS at 19:39

## 2024-01-01 RX ADMIN — CASPOFUNGIN ACETATE 250 MILLIGRAM(S): 5 INJECTION, POWDER, LYOPHILIZED, FOR SOLUTION INTRAVENOUS at 14:49

## 2024-01-01 RX ADMIN — Medication 14 UNIT(S): at 11:25

## 2024-01-01 RX ADMIN — Medication 6 UNIT(S): at 05:24

## 2024-01-01 RX ADMIN — OXYCODONE HYDROCHLORIDE 5 MILLIGRAM(S): 30 TABLET ORAL at 03:05

## 2024-01-01 RX ADMIN — PHENYLEPHRINE HYDROCHLORIDE 3.73 MICROGRAM(S)/KG/MIN: 10 INJECTION INTRAVENOUS at 21:55

## 2024-01-01 RX ADMIN — ACETAMINOPHEN 200 MILLIGRAM(S): 160 SUSPENSION ORAL at 04:50

## 2024-01-01 RX ADMIN — PANTOPRAZOLE 40 MILLIGRAM(S): 20 TABLET, DELAYED RELEASE ORAL at 12:10

## 2024-01-01 RX ADMIN — BUDESONIDE 0.5 MILLIGRAM(S): 9 TABLET, EXTENDED RELEASE ORAL at 06:22

## 2024-01-01 RX ADMIN — POTASSIUM PHOSPHATE, MONOBASIC POTASSIUM PHOSPHATE, DIBASIC 83.33 MILLIMOLE(S): 236; 224 INJECTION, SOLUTION INTRAVENOUS at 06:43

## 2024-01-01 RX ADMIN — NYSTATIN 1 APPLICATION(S): 100000 POWDER TOPICAL at 17:12

## 2024-01-01 RX ADMIN — SODIUM CHLORIDE 30 MILLILITER(S): 9 INJECTION, SOLUTION INTRAVENOUS at 07:37

## 2024-01-01 RX ADMIN — ALBUMIN HUMAN 500 MILLILITER(S): 50 SOLUTION INTRAVENOUS at 13:56

## 2024-01-01 RX ADMIN — Medication 9 UNIT(S): at 00:48

## 2024-01-01 RX ADMIN — Medication 14 MICROGRAM(S)/KG/MIN: at 08:00

## 2024-01-01 RX ADMIN — Medication 4.5 UNIT(S)/MIN: at 19:34

## 2024-01-01 RX ADMIN — HYDROMORPHONE HYDROCHLORIDE 1 MILLIGRAM(S): 4 INJECTION, SOLUTION INTRAMUSCULAR; INTRAVENOUS; SUBCUTANEOUS at 19:51

## 2024-01-01 RX ADMIN — URSODIOL 300 MILLIGRAM(S): 250 TABLET, FILM COATED ORAL at 18:38

## 2024-01-01 RX ADMIN — HYDROMORPHONE HYDROCHLORIDE 0.2 MILLIGRAM(S): 4 INJECTION, SOLUTION INTRAMUSCULAR; INTRAVENOUS; SUBCUTANEOUS at 05:45

## 2024-01-01 RX ADMIN — IPRATROPIUM BROMIDE AND ALBUTEROL SULFATE 3 MILLILITER(S): .5; 2.5 SOLUTION RESPIRATORY (INHALATION) at 05:15

## 2024-01-01 RX ADMIN — ACETAMINOPHEN 500 MILLIGRAM(S): 160 SUSPENSION ORAL at 06:52

## 2024-01-01 RX ADMIN — MEROPENEM 100 MILLIGRAM(S): 500 INJECTION INTRAVENOUS at 17:18

## 2024-01-01 RX ADMIN — Medication 100 MILLIGRAM(S): at 23:19

## 2024-01-01 RX ADMIN — ANTISEPTIC SURGICAL SCRUB 15 MILLILITER(S): 0.04 SOLUTION TOPICAL at 18:02

## 2024-01-01 RX ADMIN — DESMOPRESSIN ACETATE 230 MICROGRAM(S): 4 INJECTION, SOLUTION INTRAVENOUS; SUBCUTANEOUS at 22:17

## 2024-01-01 RX ADMIN — ALBUMIN HUMAN 1000 MILLILITER(S): 50 SOLUTION INTRAVENOUS at 08:11

## 2024-01-01 RX ADMIN — IPRATROPIUM BROMIDE AND ALBUTEROL SULFATE 3 MILLILITER(S): .5; 2.5 SOLUTION RESPIRATORY (INHALATION) at 17:48

## 2024-01-01 RX ADMIN — Medication 25 MILLIGRAM(S): at 13:58

## 2024-01-01 RX ADMIN — Medication 12.5 MILLIGRAM(S): at 11:19

## 2024-01-01 RX ADMIN — Medication 200 GRAM(S): at 14:34

## 2024-01-01 RX ADMIN — HYDROMORPHONE HYDROCHLORIDE 0.25 MILLIGRAM(S): 4 INJECTION, SOLUTION INTRAMUSCULAR; INTRAVENOUS; SUBCUTANEOUS at 06:29

## 2024-01-01 RX ADMIN — Medication 5 MILLIGRAM(S): at 03:06

## 2024-01-01 RX ADMIN — ALBUMIN HUMAN 125 MILLILITER(S): 50 SOLUTION INTRAVENOUS at 15:42

## 2024-01-01 RX ADMIN — Medication 25 GRAM(S): at 13:32

## 2024-01-01 RX ADMIN — MUPIROCIN 1 APPLICATION(S): 2 CREAM TOPICAL at 18:26

## 2024-01-01 RX ADMIN — POTASSIUM CHLORIDE 100 MILLIEQUIVALENT(S): 750 TABLET, EXTENDED RELEASE ORAL at 20:48

## 2024-01-01 RX ADMIN — Medication 20 MILLILITER(S): at 09:00

## 2024-01-01 RX ADMIN — ANTISEPTIC SURGICAL SCRUB 15 MILLILITER(S): 0.04 SOLUTION TOPICAL at 18:21

## 2024-01-01 RX ADMIN — LEVETIRACETAM 250 MILLIGRAM(S): 750 TABLET, FILM COATED ORAL at 13:11

## 2024-01-01 RX ADMIN — PROPOFOL 16.8 MICROGRAM(S)/KG/MIN: 10 INJECTION, EMULSION INTRAVENOUS at 04:01

## 2024-01-01 RX ADMIN — IPRATROPIUM BROMIDE AND ALBUTEROL SULFATE 3 MILLILITER(S): .5; 2.5 SOLUTION RESPIRATORY (INHALATION) at 05:21

## 2024-01-01 RX ADMIN — MIDODRINE HYDROCHLORIDE 10 MILLIGRAM(S): 5 TABLET ORAL at 21:40

## 2024-01-01 RX ADMIN — Medication 50 MILLIGRAM(S): at 21:03

## 2024-01-01 RX ADMIN — IPRATROPIUM BROMIDE AND ALBUTEROL SULFATE 3 MILLILITER(S): .5; 2.5 SOLUTION RESPIRATORY (INHALATION) at 11:09

## 2024-01-01 RX ADMIN — BUMETANIDE 2 MILLIGRAM(S): 2 TABLET ORAL at 22:18

## 2024-01-01 RX ADMIN — BUDESONIDE 0.5 MILLIGRAM(S): 9 TABLET, EXTENDED RELEASE ORAL at 17:18

## 2024-01-01 RX ADMIN — MEROPENEM 100 MILLIGRAM(S): 500 INJECTION INTRAVENOUS at 14:56

## 2024-01-01 RX ADMIN — QUETIAPINE FUMARATE 50 MILLIGRAM(S): 300 TABLET ORAL at 22:05

## 2024-01-01 RX ADMIN — LEVETIRACETAM 400 MILLIGRAM(S): 750 TABLET, FILM COATED ORAL at 05:01

## 2024-01-01 RX ADMIN — Medication 20 MILLILITER(S): at 03:38

## 2024-01-01 RX ADMIN — Medication 20 MILLIGRAM(S): at 12:59

## 2024-01-01 RX ADMIN — Medication 4.5 UNIT(S)/MIN: at 07:25

## 2024-01-01 RX ADMIN — URSODIOL 300 MILLIGRAM(S): 250 TABLET, FILM COATED ORAL at 18:27

## 2024-01-01 RX ADMIN — IPRATROPIUM BROMIDE AND ALBUTEROL SULFATE 3 MILLILITER(S): .5; 2.5 SOLUTION RESPIRATORY (INHALATION) at 05:01

## 2024-01-01 RX ADMIN — SODIUM CHLORIDE 125 MILLILITER(S): 9 INJECTION, SOLUTION INTRAVENOUS at 19:44

## 2024-01-01 RX ADMIN — FLUCONAZOLE 100 MILLIGRAM(S): 100 TABLET ORAL at 11:47

## 2024-01-01 RX ADMIN — ANTISEPTIC SURGICAL SCRUB 1 APPLICATION(S): 0.04 SOLUTION TOPICAL at 05:08

## 2024-01-01 RX ADMIN — Medication 5 MILLIGRAM(S): at 06:05

## 2024-01-01 RX ADMIN — NOREPINEPHRINE BITARTRATE 96.5 MICROGRAM(S)/KG/MIN: 1 INJECTION, SOLUTION, CONCENTRATE INTRAVENOUS at 19:30

## 2024-01-01 RX ADMIN — Medication 15 UNIT(S): at 15:00

## 2024-01-01 RX ADMIN — ANTISEPTIC SURGICAL SCRUB 1 APPLICATION(S): 0.04 SOLUTION TOPICAL at 05:03

## 2024-01-01 RX ADMIN — Medication 100 MILLIGRAM(S): at 22:24

## 2024-01-01 RX ADMIN — LIDOCAINE HYDROCHLORIDE 1 PATCH: 30 CREAM TOPICAL at 18:44

## 2024-01-01 RX ADMIN — HYDROMORPHONE HYDROCHLORIDE 1 MILLIGRAM(S): 4 INJECTION, SOLUTION INTRAMUSCULAR; INTRAVENOUS; SUBCUTANEOUS at 00:15

## 2024-01-01 RX ADMIN — Medication 5000 UNIT(S): at 05:03

## 2024-01-01 RX ADMIN — Medication 5 MILLIGRAM(S): at 02:39

## 2024-01-01 RX ADMIN — SULFAMETHOXAZOLE AND TRIMETHOPRIM 210 MILLIGRAM(S): 400; 80 TABLET ORAL at 11:58

## 2024-01-01 RX ADMIN — Medication 80 MILLIGRAM(S): at 05:06

## 2024-01-01 RX ADMIN — FENTANYL CITRATE 1 PATCH: 50 INJECTION INTRAMUSCULAR; INTRAVENOUS at 09:39

## 2024-01-01 RX ADMIN — SULFAMETHOXAZOLE AND TRIMETHOPRIM 210 MILLIGRAM(S): 400; 80 TABLET ORAL at 12:32

## 2024-01-01 RX ADMIN — Medication 25 GRAM(S): at 21:24

## 2024-01-01 RX ADMIN — HYDROMORPHONE HYDROCHLORIDE 1 MILLIGRAM(S): 4 INJECTION, SOLUTION INTRAMUSCULAR; INTRAVENOUS; SUBCUTANEOUS at 09:50

## 2024-01-01 RX ADMIN — POTASSIUM PHOSPHATE, MONOBASIC POTASSIUM PHOSPHATE, DIBASIC 62.5 MILLIMOLE(S): 236; 224 INJECTION, SOLUTION INTRAVENOUS at 03:29

## 2024-01-01 RX ADMIN — Medication 5000 UNIT(S): at 17:38

## 2024-01-01 RX ADMIN — LINEZOLID 300 MILLIGRAM(S): 600 INJECTION, SOLUTION INTRAVENOUS at 09:02

## 2024-01-01 RX ADMIN — Medication 4: at 17:56

## 2024-01-01 RX ADMIN — FLUCONAZOLE 100 MILLIGRAM(S): 100 TABLET ORAL at 23:28

## 2024-01-01 RX ADMIN — Medication 14 MICROGRAM(S)/KG/MIN: at 15:00

## 2024-01-01 RX ADMIN — SODIUM CHLORIDE 2000 MILLILITER(S): 9 INJECTION, SOLUTION INTRAVENOUS at 13:26

## 2024-01-01 RX ADMIN — ANTISEPTIC SURGICAL SCRUB 1 APPLICATION(S): 0.04 SOLUTION TOPICAL at 06:12

## 2024-01-01 RX ADMIN — ALBUMIN HUMAN 125 MILLILITER(S): 50 SOLUTION INTRAVENOUS at 16:39

## 2024-01-01 RX ADMIN — NIFEDIPINE 60 MILLIGRAM(S): 90 TABLET, FILM COATED, EXTENDED RELEASE ORAL at 09:53

## 2024-01-01 RX ADMIN — HYDROMORPHONE HYDROCHLORIDE 0.5 MILLIGRAM(S): 4 INJECTION, SOLUTION INTRAMUSCULAR; INTRAVENOUS; SUBCUTANEOUS at 19:00

## 2024-01-01 RX ADMIN — PIPERACILLIN SODIUM AND TAZOBACTAM SODIUM 25 GRAM(S): 2; 250 INJECTION, POWDER, FOR SOLUTION INTRAVENOUS at 06:38

## 2024-01-01 RX ADMIN — Medication 1 MILLIGRAM(S): at 20:40

## 2024-01-01 RX ADMIN — Medication 100 MILLIGRAM(S): at 09:29

## 2024-01-01 RX ADMIN — Medication 1 TABLET(S): at 05:13

## 2024-01-01 RX ADMIN — INSULIN GLARGINE-YFGN 6 UNIT(S): 100 INJECTION, SOLUTION SUBCUTANEOUS at 22:48

## 2024-01-01 RX ADMIN — ALBUMIN HUMAN 125 MILLILITER(S): 50 SOLUTION INTRAVENOUS at 09:45

## 2024-01-01 RX ADMIN — IPRATROPIUM BROMIDE AND ALBUTEROL SULFATE 3 MILLILITER(S): .5; 2.5 SOLUTION RESPIRATORY (INHALATION) at 05:03

## 2024-01-01 RX ADMIN — HYDROMORPHONE HYDROCHLORIDE 0.25 MILLIGRAM(S): 4 INJECTION, SOLUTION INTRAMUSCULAR; INTRAVENOUS; SUBCUTANEOUS at 02:02

## 2024-01-01 RX ADMIN — Medication 200 GRAM(S): at 01:10

## 2024-01-01 RX ADMIN — SODIUM BICARBONATE 50 MILLIEQUIVALENT(S): 42 INJECTION, SOLUTION INTRAVENOUS at 10:45

## 2024-01-01 RX ADMIN — HYDROMORPHONE HYDROCHLORIDE 1 MILLIGRAM(S): 4 INJECTION, SOLUTION INTRAMUSCULAR; INTRAVENOUS; SUBCUTANEOUS at 12:00

## 2024-01-01 RX ADMIN — SODIUM CHLORIDE 50 MILLILITER(S): 9 INJECTION, SOLUTION INTRAVENOUS at 08:19

## 2024-01-01 RX ADMIN — SULFAMETHOXAZOLE AND TRIMETHOPRIM 210 MILLIGRAM(S): 400; 80 TABLET ORAL at 12:18

## 2024-01-01 RX ADMIN — VALGANCICLOVIR HYDROCHLORIDE 450 MILLIGRAM(S): 50 POWDER, FOR SOLUTION ORAL at 11:56

## 2024-01-01 RX ADMIN — Medication 0.5 MILLIGRAM(S): at 04:33

## 2024-01-01 RX ADMIN — Medication 25 MILLIGRAM(S): at 12:54

## 2024-01-01 RX ADMIN — LINEZOLID 300 MILLIGRAM(S): 600 INJECTION, SOLUTION INTRAVENOUS at 06:46

## 2024-01-01 RX ADMIN — Medication 6: at 00:01

## 2024-01-01 RX ADMIN — BUDESONIDE 0.5 MILLIGRAM(S): 9 TABLET, EXTENDED RELEASE ORAL at 17:48

## 2024-01-01 RX ADMIN — ALBUMIN HUMAN 50 MILLILITER(S): 50 SOLUTION INTRAVENOUS at 12:25

## 2024-01-01 RX ADMIN — SODIUM BICARBONATE 50 MILLIEQUIVALENT(S): 42 INJECTION, SOLUTION INTRAVENOUS at 17:40

## 2024-01-01 RX ADMIN — ALBUMIN HUMAN 500 MILLILITER(S): 50 SOLUTION INTRAVENOUS at 02:15

## 2024-01-01 RX ADMIN — Medication 4.5 UNIT(S)/MIN: at 08:17

## 2024-01-01 RX ADMIN — MUPIROCIN 1 APPLICATION(S): 2 CREAM TOPICAL at 17:05

## 2024-01-01 RX ADMIN — Medication 200 GRAM(S): at 01:33

## 2024-01-01 RX ADMIN — ANTISEPTIC SURGICAL SCRUB 15 MILLILITER(S): 0.04 SOLUTION TOPICAL at 17:22

## 2024-01-01 RX ADMIN — OXYCODONE HYDROCHLORIDE 10 MILLIGRAM(S): 30 TABLET ORAL at 09:00

## 2024-01-01 RX ADMIN — POTASSIUM CHLORIDE 100 MILLIEQUIVALENT(S): 750 TABLET, EXTENDED RELEASE ORAL at 00:29

## 2024-01-01 RX ADMIN — NYSTATIN 1 APPLICATION(S): 100000 POWDER TOPICAL at 18:37

## 2024-01-01 RX ADMIN — TACROLIMUS 0.5 MILLIGRAM(S): 1 CAPSULE, GELATIN COATED ORAL at 08:29

## 2024-01-01 RX ADMIN — Medication 1 MILLIGRAM(S): at 06:11

## 2024-01-01 RX ADMIN — Medication 200 GRAM(S): at 02:06

## 2024-01-01 RX ADMIN — HYDROMORPHONE HYDROCHLORIDE 0.5 MILLIGRAM(S): 4 INJECTION, SOLUTION INTRAMUSCULAR; INTRAVENOUS; SUBCUTANEOUS at 15:25

## 2024-01-01 RX ADMIN — Medication 5 MILLIGRAM(S): at 23:35

## 2024-01-01 RX ADMIN — Medication 250 MICROGRAM(S): at 12:20

## 2024-01-01 RX ADMIN — DESMOPRESSIN ACETATE 235 MICROGRAM(S): 4 INJECTION, SOLUTION INTRAVENOUS; SUBCUTANEOUS at 16:27

## 2024-01-01 RX ADMIN — Medication 12.5 MILLIGRAM(S): at 23:16

## 2024-01-01 RX ADMIN — SODIUM CHLORIDE 125 MILLILITER(S): 9 INJECTION, SOLUTION INTRAVENOUS at 07:51

## 2024-01-01 RX ADMIN — BUDESONIDE 0.5 MILLIGRAM(S): 9 TABLET, EXTENDED RELEASE ORAL at 05:15

## 2024-01-01 RX ADMIN — NYSTATIN 1 APPLICATION(S): 100000 POWDER TOPICAL at 05:08

## 2024-01-01 RX ADMIN — SODIUM BICARBONATE 50 MILLIEQUIVALENT(S): 42 INJECTION, SOLUTION INTRAVENOUS at 16:00

## 2024-01-01 RX ADMIN — CYCLOSPORINE 25 MILLIGRAM(S): 100 CAPSULE, GELATIN COATED ORAL at 13:54

## 2024-01-01 RX ADMIN — Medication 9 UNIT(S): at 11:21

## 2024-01-01 RX ADMIN — ANTISEPTIC SURGICAL SCRUB 15 MILLILITER(S): 0.04 SOLUTION TOPICAL at 05:33

## 2024-01-01 RX ADMIN — Medication 5 MILLIGRAM(S): at 23:43

## 2024-01-01 RX ADMIN — MEROPENEM 100 MILLIGRAM(S): 500 INJECTION INTRAVENOUS at 05:34

## 2024-01-01 RX ADMIN — CYCLOSPORINE 50 MILLIGRAM(S): 100 CAPSULE, GELATIN COATED ORAL at 08:45

## 2024-01-01 RX ADMIN — Medication 1 TABLET(S): at 05:43

## 2024-01-01 RX ADMIN — MYCOPHENOLATE MOFETIL 1000 MILLIGRAM(S): 200 POWDER, FOR SUSPENSION ORAL at 21:04

## 2024-01-01 RX ADMIN — ANTISEPTIC SURGICAL SCRUB 1 APPLICATION(S): 0.04 SOLUTION TOPICAL at 15:18

## 2024-01-01 RX ADMIN — ANTISEPTIC SURGICAL SCRUB 15 MILLILITER(S): 0.04 SOLUTION TOPICAL at 18:04

## 2024-01-01 RX ADMIN — PANTOPRAZOLE 40 MILLIGRAM(S): 20 TABLET, DELAYED RELEASE ORAL at 21:28

## 2024-01-01 RX ADMIN — ALBUMIN HUMAN 1000 MILLILITER(S): 50 SOLUTION INTRAVENOUS at 00:34

## 2024-01-01 RX ADMIN — Medication 20 MILLILITER(S): at 08:00

## 2024-01-01 RX ADMIN — ACETAMINOPHEN 200 MILLIGRAM(S): 160 SUSPENSION ORAL at 03:00

## 2024-01-01 RX ADMIN — ANTISEPTIC SURGICAL SCRUB 15 MILLILITER(S): 0.04 SOLUTION TOPICAL at 05:10

## 2024-01-01 RX ADMIN — PROPOFOL 2.81 MICROGRAM(S)/KG/MIN: 10 INJECTION, EMULSION INTRAVENOUS at 19:30

## 2024-01-01 RX ADMIN — ALBUMIN HUMAN 50 MILLILITER(S): 50 SOLUTION INTRAVENOUS at 23:35

## 2024-01-01 RX ADMIN — OXYCODONE HYDROCHLORIDE 10 MILLIGRAM(S): 30 TABLET ORAL at 06:06

## 2024-01-01 RX ADMIN — LINEZOLID 300 MILLIGRAM(S): 600 INJECTION, SOLUTION INTRAVENOUS at 06:52

## 2024-01-01 RX ADMIN — Medication 2: at 23:49

## 2024-01-01 RX ADMIN — METHYLNALTREXONE BROMIDE 12 MILLIGRAM(S): 12 INJECTION, SOLUTION SUBCUTANEOUS at 17:52

## 2024-01-01 RX ADMIN — Medication 50 MILLIGRAM(S): at 18:15

## 2024-01-01 RX ADMIN — OXYCODONE HYDROCHLORIDE 5 MILLIGRAM(S): 30 TABLET ORAL at 23:02

## 2024-01-01 RX ADMIN — LEVETIRACETAM 400 MILLIGRAM(S): 750 TABLET, FILM COATED ORAL at 17:12

## 2024-01-01 RX ADMIN — Medication 5000 UNIT(S): at 05:26

## 2024-01-01 RX ADMIN — HYDROMORPHONE HYDROCHLORIDE 0.25 MILLIGRAM(S): 4 INJECTION, SOLUTION INTRAMUSCULAR; INTRAVENOUS; SUBCUTANEOUS at 01:32

## 2024-01-01 RX ADMIN — AMIODARONE HYDROCHLORIDE 33.3 MG/MIN: 50 INJECTION, SOLUTION INTRAVENOUS at 21:11

## 2024-01-01 RX ADMIN — NALOXEGOL OXALATE 12.5 MILLIGRAM(S): 12.5 TABLET, FILM COATED ORAL at 22:03

## 2024-01-01 RX ADMIN — HALOPERIDOL 2.5 MILLIGRAM(S): 10 TABLET ORAL at 10:26

## 2024-01-01 RX ADMIN — Medication 4 MILLILITER(S): at 23:36

## 2024-01-01 RX ADMIN — BUDESONIDE 0.5 MILLIGRAM(S): 9 TABLET, EXTENDED RELEASE ORAL at 17:27

## 2024-01-01 RX ADMIN — Medication 1.5 UNIT(S)/MIN: at 15:00

## 2024-01-01 RX ADMIN — IPRATROPIUM BROMIDE AND ALBUTEROL SULFATE 3 MILLILITER(S): .5; 2.5 SOLUTION RESPIRATORY (INHALATION) at 11:36

## 2024-01-01 RX ADMIN — Medication 32 MILLIGRAM(S): at 11:26

## 2024-01-01 RX ADMIN — FENTANYL CITRATE 1 PATCH: 50 INJECTION INTRAMUSCULAR; INTRAVENOUS at 19:00

## 2024-01-01 RX ADMIN — Medication 2 MILLIGRAM(S): at 21:21

## 2024-01-01 RX ADMIN — IPRATROPIUM BROMIDE AND ALBUTEROL SULFATE 3 MILLILITER(S): .5; 2.5 SOLUTION RESPIRATORY (INHALATION) at 23:17

## 2024-01-01 RX ADMIN — Medication 5000 UNIT(S): at 05:47

## 2024-01-01 RX ADMIN — Medication 200 GRAM(S): at 13:43

## 2024-01-01 RX ADMIN — ALBUMIN HUMAN 500 MILLILITER(S): 50 SOLUTION INTRAVENOUS at 00:19

## 2024-01-01 RX ADMIN — SULFAMETHOXAZOLE AND TRIMETHOPRIM 266.25 MILLIGRAM(S): 400; 80 TABLET ORAL at 05:46

## 2024-01-01 RX ADMIN — Medication 5 MILLIGRAM(S): at 07:50

## 2024-01-01 RX ADMIN — IPRATROPIUM BROMIDE AND ALBUTEROL SULFATE 3 MILLILITER(S): .5; 2.5 SOLUTION RESPIRATORY (INHALATION) at 11:01

## 2024-01-01 RX ADMIN — ANTISEPTIC SURGICAL SCRUB 15 MILLILITER(S): 0.04 SOLUTION TOPICAL at 17:05

## 2024-01-01 RX ADMIN — ANTISEPTIC SURGICAL SCRUB 1 APPLICATION(S): 0.04 SOLUTION TOPICAL at 05:58

## 2024-01-01 RX ADMIN — Medication 1 MILLIGRAM(S): at 02:40

## 2024-01-01 RX ADMIN — POTASSIUM PHOSPHATE, MONOBASIC POTASSIUM PHOSPHATE, DIBASIC 83.33 MILLIMOLE(S): 236; 224 INJECTION, SOLUTION INTRAVENOUS at 11:08

## 2024-01-01 RX ADMIN — SULFAMETHOXAZOLE AND TRIMETHOPRIM 266.25 MILLIGRAM(S): 400; 80 TABLET ORAL at 02:44

## 2024-01-01 RX ADMIN — ACETAMINOPHEN 650 MILLIGRAM(S): 160 SUSPENSION ORAL at 10:46

## 2024-01-01 RX ADMIN — ANTISEPTIC SURGICAL SCRUB 1 APPLICATION(S): 0.04 SOLUTION TOPICAL at 07:00

## 2024-01-01 RX ADMIN — POTASSIUM PHOSPHATE, MONOBASIC POTASSIUM PHOSPHATE, DIBASIC 83.33 MILLIMOLE(S): 236; 224 INJECTION, SOLUTION INTRAVENOUS at 09:38

## 2024-01-01 RX ADMIN — Medication 6: at 17:15

## 2024-01-01 RX ADMIN — Medication 4: at 00:45

## 2024-01-01 RX ADMIN — Medication 50 MILLILITER(S): at 00:07

## 2024-01-01 RX ADMIN — Medication 50 MILLILITER(S): at 18:42

## 2024-01-01 RX ADMIN — MEROPENEM 100 MILLIGRAM(S): 500 INJECTION INTRAVENOUS at 19:42

## 2024-01-01 RX ADMIN — Medication 5 MILLIGRAM(S): at 11:19

## 2024-01-01 RX ADMIN — Medication 32 MILLIGRAM(S): at 12:09

## 2024-01-01 RX ADMIN — Medication 50 MILLILITER(S): at 18:43

## 2024-01-01 RX ADMIN — ACETAMINOPHEN 500 MILLIGRAM(S): 160 SUSPENSION ORAL at 03:30

## 2024-01-01 RX ADMIN — Medication 50 MILLIGRAM(S): at 06:01

## 2024-01-01 RX ADMIN — SODIUM CHLORIDE 30 MILLILITER(S): 9 INJECTION, SOLUTION INTRAVENOUS at 08:45

## 2024-01-01 RX ADMIN — LEVETIRACETAM 400 MILLIGRAM(S): 750 TABLET, FILM COATED ORAL at 05:24

## 2024-01-01 RX ADMIN — Medication 2: at 13:00

## 2024-01-01 RX ADMIN — ACETAMINOPHEN 200 MILLIGRAM(S): 160 SUSPENSION ORAL at 22:06

## 2024-01-01 RX ADMIN — Medication 50 MILLIGRAM(S): at 05:49

## 2024-01-01 RX ADMIN — AMIODARONE HYDROCHLORIDE 33.3 MG/MIN: 50 INJECTION, SOLUTION INTRAVENOUS at 15:35

## 2024-01-01 RX ADMIN — HYDROMORPHONE HYDROCHLORIDE 1 MILLIGRAM(S): 4 INJECTION, SOLUTION INTRAMUSCULAR; INTRAVENOUS; SUBCUTANEOUS at 04:00

## 2024-01-01 RX ADMIN — Medication 100 MILLIGRAM(S): at 18:00

## 2024-01-01 RX ADMIN — FENTANYL CITRATE 50 MICROGRAM(S): 50 INJECTION INTRAMUSCULAR; INTRAVENOUS at 17:50

## 2024-01-01 RX ADMIN — SODIUM BICARBONATE 84 MEQ/KG/HR: 42 INJECTION, SOLUTION INTRAVENOUS at 11:56

## 2024-01-01 RX ADMIN — Medication 80 MILLIGRAM(S): at 21:17

## 2024-01-01 RX ADMIN — IPRATROPIUM BROMIDE AND ALBUTEROL SULFATE 3 MILLILITER(S): .5; 2.5 SOLUTION RESPIRATORY (INHALATION) at 17:14

## 2024-01-01 RX ADMIN — Medication 50 MILLIGRAM(S): at 11:55

## 2024-01-01 RX ADMIN — Medication 0.5 MILLIGRAM(S): at 03:39

## 2024-01-01 RX ADMIN — OXYCODONE HYDROCHLORIDE 10 MILLIGRAM(S): 30 TABLET ORAL at 13:05

## 2024-01-01 RX ADMIN — NYSTATIN 1 APPLICATION(S): 100000 POWDER TOPICAL at 18:16

## 2024-01-01 RX ADMIN — POTASSIUM CHLORIDE 100 MILLIEQUIVALENT(S): 750 TABLET, EXTENDED RELEASE ORAL at 09:30

## 2024-01-01 RX ADMIN — Medication 3 UNIT(S): at 00:11

## 2024-01-01 RX ADMIN — Medication 2: at 02:13

## 2024-01-01 RX ADMIN — ALBUMIN HUMAN 125 MILLILITER(S): 50 SOLUTION INTRAVENOUS at 17:48

## 2024-01-01 RX ADMIN — ANTISEPTIC SURGICAL SCRUB 1 APPLICATION(S): 0.04 SOLUTION TOPICAL at 05:21

## 2024-01-01 RX ADMIN — Medication 2: at 18:28

## 2024-01-01 RX ADMIN — Medication 12 UNIT(S): at 12:08

## 2024-01-01 RX ADMIN — HYDROMORPHONE HYDROCHLORIDE 1 MILLIGRAM(S): 4 INJECTION, SOLUTION INTRAMUSCULAR; INTRAVENOUS; SUBCUTANEOUS at 01:30

## 2024-01-01 RX ADMIN — BUDESONIDE 0.5 MILLIGRAM(S): 9 TABLET, EXTENDED RELEASE ORAL at 05:01

## 2024-01-01 RX ADMIN — OXYCODONE HYDROCHLORIDE 5 MILLIGRAM(S): 30 TABLET ORAL at 05:00

## 2024-01-01 RX ADMIN — CASPOFUNGIN ACETATE 250 MILLIGRAM(S): 5 INJECTION, POWDER, LYOPHILIZED, FOR SOLUTION INTRAVENOUS at 11:22

## 2024-01-01 RX ADMIN — MEROPENEM 100 MILLIGRAM(S): 500 INJECTION INTRAVENOUS at 05:45

## 2024-01-01 RX ADMIN — Medication 10 MILLIGRAM(S): at 21:30

## 2024-01-01 RX ADMIN — Medication 50 MILLIGRAM(S): at 00:51

## 2024-01-01 RX ADMIN — Medication 5 MILLIGRAM(S): at 11:10

## 2024-01-01 RX ADMIN — URSODIOL 300 MILLIGRAM(S): 250 TABLET, FILM COATED ORAL at 06:39

## 2024-01-01 RX ADMIN — Medication 4: at 11:59

## 2024-01-01 RX ADMIN — SODIUM BICARBONATE 50 MILLIEQUIVALENT(S): 42 INJECTION, SOLUTION INTRAVENOUS at 05:26

## 2024-01-01 RX ADMIN — FENTANYL CITRATE 25 MICROGRAM(S): 50 INJECTION INTRAMUSCULAR; INTRAVENOUS at 08:26

## 2024-01-01 RX ADMIN — URSODIOL 300 MILLIGRAM(S): 250 TABLET, FILM COATED ORAL at 05:15

## 2024-01-01 RX ADMIN — PANTOPRAZOLE 10 MG/HR: 20 TABLET, DELAYED RELEASE ORAL at 14:46

## 2024-01-01 RX ADMIN — Medication 9.5 UNIT(S)/HR: at 00:34

## 2024-01-01 RX ADMIN — Medication 1 TABLET(S): at 17:54

## 2024-01-01 RX ADMIN — Medication 2.81 MICROGRAM(S)/KG/MIN: at 22:51

## 2024-01-01 RX ADMIN — Medication 125 MICROGRAM(S): at 11:47

## 2024-01-01 RX ADMIN — INSULIN GLARGINE-YFGN 16 UNIT(S): 100 INJECTION, SOLUTION SUBCUTANEOUS at 22:21

## 2024-01-01 RX ADMIN — BUDESONIDE 0.5 MILLIGRAM(S): 9 TABLET, EXTENDED RELEASE ORAL at 18:27

## 2024-01-01 RX ADMIN — Medication 4: at 12:40

## 2024-01-01 RX ADMIN — Medication 3 UNIT(S)/HR: at 07:29

## 2024-01-01 RX ADMIN — POTASSIUM CHLORIDE 100 MILLIEQUIVALENT(S): 750 TABLET, EXTENDED RELEASE ORAL at 08:42

## 2024-01-01 RX ADMIN — SODIUM BICARBONATE 50 MILLIEQUIVALENT(S): 42 INJECTION, SOLUTION INTRAVENOUS at 13:30

## 2024-01-01 RX ADMIN — PANTOPRAZOLE 40 MILLIGRAM(S): 20 TABLET, DELAYED RELEASE ORAL at 06:05

## 2024-01-01 RX ADMIN — OXYCODONE HYDROCHLORIDE 10 MILLIGRAM(S): 30 TABLET ORAL at 10:30

## 2024-01-01 RX ADMIN — Medication 100 MILLIGRAM(S): at 11:07

## 2024-01-01 RX ADMIN — Medication 100 MILLIGRAM(S): at 09:28

## 2024-01-01 RX ADMIN — LEVETIRACETAM 400 MILLIGRAM(S): 750 TABLET, FILM COATED ORAL at 05:29

## 2024-01-01 RX ADMIN — Medication 6: at 14:36

## 2024-01-01 RX ADMIN — Medication 0.5 MILLIGRAM(S): at 15:44

## 2024-01-01 RX ADMIN — FLUCONAZOLE 100 MILLIGRAM(S): 100 TABLET ORAL at 12:42

## 2024-01-01 RX ADMIN — Medication 50 MILLIGRAM(S): at 13:20

## 2024-01-01 RX ADMIN — Medication 5000 UNIT(S): at 17:16

## 2024-01-01 RX ADMIN — CASPOFUNGIN ACETATE 250 MILLIGRAM(S): 5 INJECTION, POWDER, LYOPHILIZED, FOR SOLUTION INTRAVENOUS at 14:17

## 2024-01-01 RX ADMIN — Medication 9 UNIT(S)/MIN: at 11:45

## 2024-01-01 RX ADMIN — NYSTATIN 1 APPLICATION(S): 100000 POWDER TOPICAL at 05:58

## 2024-01-01 RX ADMIN — SODIUM BICARBONATE 50 MILLIEQUIVALENT(S): 42 INJECTION, SOLUTION INTRAVENOUS at 18:45

## 2024-01-01 RX ADMIN — AMIODARONE HYDROCHLORIDE 8.33 MG/MIN: 50 INJECTION, SOLUTION INTRAVENOUS at 19:33

## 2024-01-01 RX ADMIN — OXYCODONE HYDROCHLORIDE 5 MILLIGRAM(S): 30 TABLET ORAL at 22:52

## 2024-01-01 RX ADMIN — NOREPINEPHRINE BITARTRATE 8.78 MICROGRAM(S)/KG/MIN: 1 INJECTION, SOLUTION, CONCENTRATE INTRAVENOUS at 07:42

## 2024-01-01 RX ADMIN — OXYCODONE HYDROCHLORIDE 10 MILLIGRAM(S): 30 TABLET ORAL at 21:56

## 2024-01-01 RX ADMIN — SULFAMETHOXAZOLE AND TRIMETHOPRIM 80 MILLIGRAM(S): 400; 80 TABLET ORAL at 14:16

## 2024-01-01 RX ADMIN — Medication 125 MICROGRAM(S): at 11:24

## 2024-01-01 RX ADMIN — SODIUM CHLORIDE 2000 MILLILITER(S): 9 INJECTION, SOLUTION INTRAVENOUS at 19:00

## 2024-01-01 RX ADMIN — MEROPENEM 100 MILLIGRAM(S): 500 INJECTION INTRAVENOUS at 03:22

## 2024-01-01 RX ADMIN — IPRATROPIUM BROMIDE AND ALBUTEROL SULFATE 3 MILLILITER(S): .5; 2.5 SOLUTION RESPIRATORY (INHALATION) at 17:17

## 2024-01-01 RX ADMIN — HYDROMORPHONE HYDROCHLORIDE 1 MILLIGRAM(S): 4 INJECTION, SOLUTION INTRAMUSCULAR; INTRAVENOUS; SUBCUTANEOUS at 22:40

## 2024-01-01 RX ADMIN — PANTOPRAZOLE 10 MG/HR: 20 TABLET, DELAYED RELEASE ORAL at 03:06

## 2024-01-01 RX ADMIN — MYCOPHENOLATE MOFETIL 1000 MILLIGRAM(S): 200 POWDER, FOR SUSPENSION ORAL at 07:22

## 2024-01-01 RX ADMIN — Medication 14 MICROGRAM(S)/KG/MIN: at 10:27

## 2024-01-01 RX ADMIN — Medication 12 UNIT(S): at 23:39

## 2024-01-01 RX ADMIN — SODIUM CHLORIDE 75 MILLILITER(S): 9 INJECTION, SOLUTION INTRAVENOUS at 08:47

## 2024-01-01 RX ADMIN — Medication 4 MILLILITER(S): at 05:07

## 2024-01-01 RX ADMIN — ALBUMIN HUMAN 1000 MILLILITER(S): 50 SOLUTION INTRAVENOUS at 13:13

## 2024-01-01 RX ADMIN — HYDROMORPHONE HYDROCHLORIDE 0.25 MILLIGRAM(S): 4 INJECTION, SOLUTION INTRAMUSCULAR; INTRAVENOUS; SUBCUTANEOUS at 20:22

## 2024-01-01 RX ADMIN — Medication 6: at 11:25

## 2024-01-01 RX ADMIN — MYCOPHENOLATE MOFETIL 1000 MILLIGRAM(S): 200 POWDER, FOR SUSPENSION ORAL at 21:34

## 2024-01-01 RX ADMIN — Medication 4: at 23:06

## 2024-01-01 RX ADMIN — ATORVASTATIN CALCIUM 80 MILLIGRAM(S): 80 TABLET, FILM COATED ORAL at 22:28

## 2024-01-01 RX ADMIN — HYDROMORPHONE HYDROCHLORIDE 0.5 MILLIGRAM(S): 4 INJECTION, SOLUTION INTRAMUSCULAR; INTRAVENOUS; SUBCUTANEOUS at 15:40

## 2024-01-01 RX ADMIN — LIDOCAINE HYDROCHLORIDE 1 PATCH: 30 CREAM TOPICAL at 14:43

## 2024-01-01 RX ADMIN — VALGANCICLOVIR HYDROCHLORIDE 450 MILLIGRAM(S): 50 POWDER, FOR SOLUTION ORAL at 14:49

## 2024-01-01 RX ADMIN — PHENYLEPHRINE HYDROCHLORIDE 28 MICROGRAM(S)/KG/MIN: 10 INJECTION INTRAVENOUS at 17:35

## 2024-01-01 RX ADMIN — Medication 0.5 MILLIGRAM(S): at 01:38

## 2024-01-01 RX ADMIN — Medication 500 MICROGRAM(S): at 20:01

## 2024-01-01 RX ADMIN — SODIUM CHLORIDE 30 MILLILITER(S): 9 INJECTION, SOLUTION INTRAVENOUS at 20:03

## 2024-01-01 RX ADMIN — ANTISEPTIC SURGICAL SCRUB 1 APPLICATION(S): 0.04 SOLUTION TOPICAL at 05:55

## 2024-01-01 RX ADMIN — Medication 1 MILLIGRAM(S): at 09:10

## 2024-01-01 RX ADMIN — DEXMEDETOMIDINE HYDROCHLORIDE 0.7 MICROGRAM(S)/KG/HR: 4 INJECTION, SOLUTION INTRAVENOUS at 20:47

## 2024-01-01 RX ADMIN — ACETAMINOPHEN 200 MILLIGRAM(S): 160 SUSPENSION ORAL at 23:35

## 2024-01-01 RX ADMIN — MUPIROCIN 1 APPLICATION(S): 2 CREAM TOPICAL at 05:11

## 2024-01-01 RX ADMIN — IPRATROPIUM BROMIDE AND ALBUTEROL SULFATE 3 MILLILITER(S): .5; 2.5 SOLUTION RESPIRATORY (INHALATION) at 23:37

## 2024-01-01 RX ADMIN — QUETIAPINE FUMARATE 12.5 MILLIGRAM(S): 300 TABLET ORAL at 22:06

## 2024-01-01 RX ADMIN — NYSTATIN 1 APPLICATION(S): 100000 POWDER TOPICAL at 17:38

## 2024-01-01 RX ADMIN — IPRATROPIUM BROMIDE AND ALBUTEROL SULFATE 3 MILLILITER(S): .5; 2.5 SOLUTION RESPIRATORY (INHALATION) at 17:15

## 2024-01-01 RX ADMIN — Medication 32 MILLIGRAM(S): at 12:02

## 2024-01-01 RX ADMIN — FLUCONAZOLE 100 MILLIGRAM(S): 100 TABLET ORAL at 12:40

## 2024-01-01 RX ADMIN — BUDESONIDE 0.5 MILLIGRAM(S): 9 TABLET, EXTENDED RELEASE ORAL at 17:30

## 2024-01-01 RX ADMIN — LEVETIRACETAM 400 MILLIGRAM(S): 750 TABLET, FILM COATED ORAL at 05:11

## 2024-01-01 RX ADMIN — URSODIOL 300 MILLIGRAM(S): 250 TABLET, FILM COATED ORAL at 05:12

## 2024-01-01 RX ADMIN — FENTANYL CITRATE 1 PATCH: 50 INJECTION INTRAMUSCULAR; INTRAVENOUS at 17:43

## 2024-01-01 RX ADMIN — Medication 100 MILLIGRAM(S): at 05:04

## 2024-01-01 RX ADMIN — SODIUM CHLORIDE 50 MILLILITER(S): 9 INJECTION, SOLUTION INTRAVENOUS at 20:47

## 2024-01-01 RX ADMIN — NOREPINEPHRINE BITARTRATE 13.1 MICROGRAM(S)/KG/MIN: 1 INJECTION, SOLUTION, CONCENTRATE INTRAVENOUS at 01:00

## 2024-01-01 RX ADMIN — ANTISEPTIC SURGICAL SCRUB 15 MILLILITER(S): 0.04 SOLUTION TOPICAL at 05:55

## 2024-01-01 RX ADMIN — FLUCONAZOLE 400 MILLIGRAM(S): 100 TABLET ORAL at 12:06

## 2024-01-01 RX ADMIN — ACETAMINOPHEN, DIPHENHYDRAMINE HCL, PHENYLEPHRINE HCL 5 MILLIGRAM(S): 325; 25; 5 TABLET ORAL at 23:10

## 2024-01-01 RX ADMIN — Medication 125 MICROGRAM(S): at 11:02

## 2024-01-01 RX ADMIN — PHENYLEPHRINE HYDROCHLORIDE 1.87 MICROGRAM(S)/KG/MIN: 10 INJECTION INTRAVENOUS at 07:45

## 2024-01-01 RX ADMIN — ALBUMIN HUMAN 1000 MILLILITER(S): 50 SOLUTION INTRAVENOUS at 05:25

## 2024-01-01 RX ADMIN — AMPICILLIN SODIUM AND SULBACTAM SODIUM 200 GRAM(S): 2; 1 INJECTION, POWDER, FOR SOLUTION INTRAMUSCULAR; INTRAVENOUS at 23:10

## 2024-01-01 RX ADMIN — Medication 3 UNIT(S): at 14:36

## 2024-01-01 RX ADMIN — AMIODARONE HYDROCHLORIDE 8.33 MG/MIN: 50 INJECTION, SOLUTION INTRAVENOUS at 19:22

## 2024-01-01 RX ADMIN — SULFAMETHOXAZOLE AND TRIMETHOPRIM 80 MILLIGRAM(S): 400; 80 TABLET ORAL at 11:19

## 2024-01-01 RX ADMIN — Medication 2: at 21:14

## 2024-01-01 RX ADMIN — Medication 2.5 MILLIGRAM(S): at 06:12

## 2024-01-01 RX ADMIN — Medication 40 MILLIGRAM(S): at 11:30

## 2024-01-01 RX ADMIN — HYDROMORPHONE HYDROCHLORIDE 0.5 MILLIGRAM(S): 4 INJECTION, SOLUTION INTRAMUSCULAR; INTRAVENOUS; SUBCUTANEOUS at 23:18

## 2024-01-01 RX ADMIN — MEROPENEM 100 MILLIGRAM(S): 500 INJECTION INTRAVENOUS at 05:21

## 2024-01-01 RX ADMIN — Medication 4.5 UNIT(S)/MIN: at 01:01

## 2024-01-01 RX ADMIN — HYDROMORPHONE HYDROCHLORIDE 1 MILLIGRAM(S): 4 INJECTION, SOLUTION INTRAMUSCULAR; INTRAVENOUS; SUBCUTANEOUS at 20:45

## 2024-01-01 RX ADMIN — NYSTATIN 1 APPLICATION(S): 100000 POWDER TOPICAL at 15:28

## 2024-01-01 RX ADMIN — OXYCODONE HYDROCHLORIDE 5 MILLIGRAM(S): 30 TABLET ORAL at 10:57

## 2024-01-01 RX ADMIN — Medication 100 MILLIGRAM(S): at 21:39

## 2024-01-01 RX ADMIN — Medication 50 MILLILITER(S): at 02:18

## 2024-01-01 RX ADMIN — VALGANCICLOVIR HYDROCHLORIDE 900 MILLIGRAM(S): 50 POWDER, FOR SOLUTION ORAL at 11:53

## 2024-01-01 RX ADMIN — ASPIRIN 81 MILLIGRAM(S): 81 TABLET, COATED ORAL at 13:06

## 2024-01-01 RX ADMIN — URSODIOL 300 MILLIGRAM(S): 250 TABLET, FILM COATED ORAL at 18:35

## 2024-01-01 RX ADMIN — NYSTATIN 1 APPLICATION(S): 100000 POWDER TOPICAL at 05:03

## 2024-01-01 RX ADMIN — OXYCODONE HYDROCHLORIDE 10 MILLIGRAM(S): 30 TABLET ORAL at 06:30

## 2024-01-01 RX ADMIN — LINEZOLID 300 MILLIGRAM(S): 600 INJECTION, SOLUTION INTRAVENOUS at 18:37

## 2024-01-01 RX ADMIN — ANTISEPTIC SURGICAL SCRUB 1 APPLICATION(S): 0.04 SOLUTION TOPICAL at 05:10

## 2024-01-01 RX ADMIN — ALBUMIN HUMAN 250 MILLILITER(S): 50 SOLUTION INTRAVENOUS at 07:56

## 2024-01-01 RX ADMIN — Medication 4.5 UNIT(S)/MIN: at 14:04

## 2024-01-01 RX ADMIN — CYCLOSPORINE 2.13 MILLIGRAM(S): 100 CAPSULE, GELATIN COATED ORAL at 22:00

## 2024-01-01 RX ADMIN — Medication 2 MILLIGRAM(S): at 02:45

## 2024-01-01 RX ADMIN — PANTOPRAZOLE 40 MILLIGRAM(S): 20 TABLET, DELAYED RELEASE ORAL at 08:25

## 2024-01-01 RX ADMIN — MYCOPHENOLATE MOFETIL 41.67 MILLIGRAM(S): 200 POWDER, FOR SUSPENSION ORAL at 22:32

## 2024-01-01 RX ADMIN — LEVETIRACETAM 250 MILLIGRAM(S): 750 TABLET, FILM COATED ORAL at 06:55

## 2024-01-01 RX ADMIN — HYDROMORPHONE HYDROCHLORIDE 0.25 MILLIGRAM(S): 4 INJECTION, SOLUTION INTRAMUSCULAR; INTRAVENOUS; SUBCUTANEOUS at 21:10

## 2024-01-01 RX ADMIN — Medication 4: at 06:12

## 2024-01-01 RX ADMIN — Medication 4: at 07:13

## 2024-01-01 RX ADMIN — Medication 50 MILLIGRAM(S): at 08:07

## 2024-01-01 RX ADMIN — HYDROMORPHONE HYDROCHLORIDE 0.5 MILLIGRAM(S): 4 INJECTION, SOLUTION INTRAMUSCULAR; INTRAVENOUS; SUBCUTANEOUS at 23:15

## 2024-01-01 RX ADMIN — Medication 4.5 UNIT(S)/MIN: at 07:35

## 2024-01-01 RX ADMIN — SULFAMETHOXAZOLE AND TRIMETHOPRIM 266.25 MILLIGRAM(S): 400; 80 TABLET ORAL at 15:08

## 2024-01-01 RX ADMIN — PANTOPRAZOLE 40 MILLIGRAM(S): 20 TABLET, DELAYED RELEASE ORAL at 11:41

## 2024-01-01 RX ADMIN — POTASSIUM CHLORIDE 100 MILLIEQUIVALENT(S): 750 TABLET, EXTENDED RELEASE ORAL at 21:39

## 2024-01-01 RX ADMIN — Medication 50 MILLIGRAM(S): at 17:07

## 2024-01-01 RX ADMIN — Medication 5 MILLIGRAM(S): at 00:55

## 2024-01-01 RX ADMIN — PANTOPRAZOLE 40 MILLIGRAM(S): 20 TABLET, DELAYED RELEASE ORAL at 17:05

## 2024-01-01 RX ADMIN — HYDROMORPHONE HYDROCHLORIDE 1 MILLIGRAM(S): 4 INJECTION, SOLUTION INTRAMUSCULAR; INTRAVENOUS; SUBCUTANEOUS at 00:35

## 2024-01-01 RX ADMIN — IPRATROPIUM BROMIDE AND ALBUTEROL SULFATE 3 MILLILITER(S): .5; 2.5 SOLUTION RESPIRATORY (INHALATION) at 23:28

## 2024-01-01 RX ADMIN — NYSTATIN 1 APPLICATION(S): 100000 POWDER TOPICAL at 05:04

## 2024-01-01 RX ADMIN — SODIUM BICARBONATE 125 MEQ/KG/HR: 42 INJECTION, SOLUTION INTRAVENOUS at 19:53

## 2024-01-01 RX ADMIN — SODIUM CHLORIDE 75 MILLILITER(S): 9 INJECTION, SOLUTION INTRAVENOUS at 11:18

## 2024-01-01 RX ADMIN — ALBUMIN HUMAN 50 MILLILITER(S): 50 SOLUTION INTRAVENOUS at 15:38

## 2024-01-01 RX ADMIN — DEXMEDETOMIDINE HYDROCHLORIDE 4.68 MICROGRAM(S)/KG/HR: 4 INJECTION, SOLUTION INTRAVENOUS at 12:53

## 2024-01-01 RX ADMIN — IPRATROPIUM BROMIDE AND ALBUTEROL SULFATE 3 MILLILITER(S): .5; 2.5 SOLUTION RESPIRATORY (INHALATION) at 11:17

## 2024-01-01 RX ADMIN — INSULIN GLARGINE-YFGN 18 UNIT(S): 100 INJECTION, SOLUTION SUBCUTANEOUS at 21:09

## 2024-01-01 RX ADMIN — ACETAMINOPHEN 200 MILLIGRAM(S): 160 SUSPENSION ORAL at 02:38

## 2024-01-01 RX ADMIN — Medication 1 UNIT(S)/HR: at 07:36

## 2024-01-01 RX ADMIN — SULFAMETHOXAZOLE AND TRIMETHOPRIM 1 TABLET(S): 400; 80 TABLET ORAL at 12:16

## 2024-01-01 RX ADMIN — Medication 1 DROP(S): at 23:13

## 2024-01-01 RX ADMIN — SULFAMETHOXAZOLE AND TRIMETHOPRIM 266.25 MILLIGRAM(S): 400; 80 TABLET ORAL at 22:25

## 2024-01-01 RX ADMIN — HYDROMORPHONE HYDROCHLORIDE 1 MILLIGRAM(S): 4 INJECTION, SOLUTION INTRAMUSCULAR; INTRAVENOUS; SUBCUTANEOUS at 17:30

## 2024-01-01 RX ADMIN — ALBUMIN HUMAN 1000 MILLILITER(S): 50 SOLUTION INTRAVENOUS at 12:09

## 2024-01-01 RX ADMIN — FLUCONAZOLE 400 MILLIGRAM(S): 100 TABLET ORAL at 12:59

## 2024-01-01 RX ADMIN — PANTOPRAZOLE 40 MILLIGRAM(S): 20 TABLET, DELAYED RELEASE ORAL at 13:07

## 2024-01-01 RX ADMIN — Medication 9 UNIT(S)/MIN: at 20:48

## 2024-01-01 RX ADMIN — SODIUM BICARBONATE 50 MILLIEQUIVALENT(S): 42 INJECTION, SOLUTION INTRAVENOUS at 11:20

## 2024-01-01 RX ADMIN — Medication 0.5 MILLIGRAM(S): at 11:33

## 2024-01-01 RX ADMIN — MIDODRINE HYDROCHLORIDE 5 MILLIGRAM(S): 5 TABLET ORAL at 14:50

## 2024-01-01 RX ADMIN — IPRATROPIUM BROMIDE AND ALBUTEROL SULFATE 3 MILLILITER(S): .5; 2.5 SOLUTION RESPIRATORY (INHALATION) at 17:46

## 2024-01-01 RX ADMIN — FLUCONAZOLE 100 MILLIGRAM(S): 100 TABLET ORAL at 12:01

## 2024-01-01 RX ADMIN — IPRATROPIUM BROMIDE AND ALBUTEROL SULFATE 3 MILLILITER(S): .5; 2.5 SOLUTION RESPIRATORY (INHALATION) at 23:11

## 2024-01-01 RX ADMIN — BUDESONIDE 0.5 MILLIGRAM(S): 9 TABLET, EXTENDED RELEASE ORAL at 05:21

## 2024-01-01 RX ADMIN — Medication 1 MILLIGRAM(S): at 03:10

## 2024-01-01 RX ADMIN — ALBUMIN HUMAN 125 MILLILITER(S): 50 SOLUTION INTRAVENOUS at 10:02

## 2024-01-01 RX ADMIN — NYSTATIN 1 APPLICATION(S): 100000 POWDER TOPICAL at 13:07

## 2024-01-01 RX ADMIN — SODIUM BICARBONATE 1300 MILLIGRAM(S): 42 INJECTION, SOLUTION INTRAVENOUS at 06:11

## 2024-01-01 RX ADMIN — SULFAMETHOXAZOLE AND TRIMETHOPRIM 1 TABLET(S): 400; 80 TABLET ORAL at 11:56

## 2024-01-01 RX ADMIN — MEROPENEM 100 MILLIGRAM(S): 500 INJECTION INTRAVENOUS at 05:27

## 2024-01-01 RX ADMIN — NYSTATIN 1 APPLICATION(S): 100000 POWDER TOPICAL at 18:19

## 2024-01-01 RX ADMIN — ACETAMINOPHEN 200 MILLIGRAM(S): 160 SUSPENSION ORAL at 20:29

## 2024-01-01 RX ADMIN — BUDESONIDE 0.5 MILLIGRAM(S): 9 TABLET, EXTENDED RELEASE ORAL at 17:07

## 2024-01-01 RX ADMIN — LEVETIRACETAM 400 MILLIGRAM(S): 750 TABLET, FILM COATED ORAL at 17:11

## 2024-01-01 RX ADMIN — VALGANCICLOVIR HYDROCHLORIDE 900 MILLIGRAM(S): 50 POWDER, FOR SOLUTION ORAL at 12:52

## 2024-01-01 RX ADMIN — Medication 100 MILLIGRAM(S): at 18:16

## 2024-01-01 RX ADMIN — NALOXEGOL OXALATE 12.5 MILLIGRAM(S): 12.5 TABLET, FILM COATED ORAL at 22:31

## 2024-01-01 RX ADMIN — NYSTATIN 1 APPLICATION(S): 100000 POWDER TOPICAL at 22:05

## 2024-01-01 RX ADMIN — VALGANCICLOVIR HYDROCHLORIDE 450 MILLIGRAM(S): 50 POWDER, FOR SOLUTION ORAL at 13:40

## 2024-01-01 RX ADMIN — ACETAMINOPHEN 200 MILLIGRAM(S): 160 SUSPENSION ORAL at 09:04

## 2024-01-01 RX ADMIN — Medication 50 MILLIGRAM(S): at 03:44

## 2024-01-01 RX ADMIN — ANTISEPTIC SURGICAL SCRUB 15 MILLILITER(S): 0.04 SOLUTION TOPICAL at 17:12

## 2024-01-01 RX ADMIN — MUPIROCIN 1 APPLICATION(S): 2 CREAM TOPICAL at 17:25

## 2024-01-01 RX ADMIN — HYDROMORPHONE HYDROCHLORIDE 0.25 MILLIGRAM(S): 4 INJECTION, SOLUTION INTRAMUSCULAR; INTRAVENOUS; SUBCUTANEOUS at 18:59

## 2024-01-01 RX ADMIN — DILTIAZEM HYDROCHLORIDE 10 MG/HR: 60 TABLET ORAL at 07:17

## 2024-01-01 RX ADMIN — Medication 2: at 09:17

## 2024-01-01 RX ADMIN — Medication 80 MILLIGRAM(S): at 14:18

## 2024-01-01 RX ADMIN — ATOVAQUONE 1500 MILLIGRAM(S): 750 SUSPENSION ORAL at 11:06

## 2024-01-01 RX ADMIN — ACETAMINOPHEN 500 MILLIGRAM(S): 160 SUSPENSION ORAL at 01:00

## 2024-01-01 RX ADMIN — URSODIOL 300 MILLIGRAM(S): 250 TABLET, FILM COATED ORAL at 05:21

## 2024-01-01 RX ADMIN — POTASSIUM PHOSPHATE, MONOBASIC POTASSIUM PHOSPHATE, DIBASIC 83.33 MILLIMOLE(S): 236; 224 INJECTION, SOLUTION INTRAVENOUS at 02:40

## 2024-01-01 RX ADMIN — DEXTROSE MONOHYDRATE, SODIUM CHLORIDE, AND POTASSIUM CHLORIDE 84 MILLILITER(S): 50; 2.25; 2.24 INJECTION, SOLUTION INTRAVENOUS at 20:23

## 2024-01-01 RX ADMIN — IPRATROPIUM BROMIDE AND ALBUTEROL SULFATE 3 MILLILITER(S): .5; 2.5 SOLUTION RESPIRATORY (INHALATION) at 05:52

## 2024-01-01 RX ADMIN — Medication 2: at 08:12

## 2024-01-01 RX ADMIN — Medication 4 MILLILITER(S): at 00:19

## 2024-01-01 RX ADMIN — CASPOFUNGIN ACETATE 250 MILLIGRAM(S): 5 INJECTION, POWDER, LYOPHILIZED, FOR SOLUTION INTRAVENOUS at 11:14

## 2024-01-01 RX ADMIN — ANTISEPTIC SURGICAL SCRUB 15 MILLILITER(S): 0.04 SOLUTION TOPICAL at 17:59

## 2024-01-01 RX ADMIN — Medication 100 MILLIGRAM(S): at 05:39

## 2024-01-01 RX ADMIN — ALBUMIN HUMAN 50 MILLILITER(S): 50 SOLUTION INTRAVENOUS at 11:49

## 2024-01-01 RX ADMIN — Medication 12.5 MILLIGRAM(S): at 17:35

## 2024-01-01 RX ADMIN — NYSTATIN 1 APPLICATION(S): 100000 POWDER TOPICAL at 05:12

## 2024-01-01 RX ADMIN — SODIUM BICARBONATE 50 MILLIEQUIVALENT(S): 42 INJECTION, SOLUTION INTRAVENOUS at 21:28

## 2024-01-01 RX ADMIN — Medication 5.62 MICROGRAM(S)/KG/MIN: at 07:32

## 2024-01-01 RX ADMIN — IPRATROPIUM BROMIDE AND ALBUTEROL SULFATE 3 MILLILITER(S): .5; 2.5 SOLUTION RESPIRATORY (INHALATION) at 17:52

## 2024-01-01 RX ADMIN — BUDESONIDE 0.5 MILLIGRAM(S): 9 TABLET, EXTENDED RELEASE ORAL at 05:28

## 2024-01-01 RX ADMIN — Medication 4.5 UNIT(S)/MIN: at 07:50

## 2024-01-01 RX ADMIN — Medication 5000 UNIT(S): at 05:21

## 2024-01-01 RX ADMIN — Medication 15 UNIT(S): at 23:51

## 2024-01-01 RX ADMIN — Medication 100 MILLIGRAM(S): at 05:54

## 2024-01-01 RX ADMIN — Medication 9 UNIT(S): at 11:22

## 2024-01-01 RX ADMIN — QUETIAPINE FUMARATE 50 MILLIGRAM(S): 300 TABLET ORAL at 22:40

## 2024-01-01 RX ADMIN — OXYCODONE HYDROCHLORIDE 10 MILLIGRAM(S): 30 TABLET ORAL at 07:58

## 2024-01-01 RX ADMIN — Medication 100 MILLIGRAM(S): at 18:10

## 2024-01-01 RX ADMIN — SODIUM CHLORIDE 84 MILLILITER(S): 9 INJECTION, SOLUTION INTRAVENOUS at 08:28

## 2024-01-01 RX ADMIN — Medication 2: at 06:49

## 2024-01-01 RX ADMIN — NYSTATIN 1 APPLICATION(S): 100000 POWDER TOPICAL at 05:18

## 2024-01-01 RX ADMIN — IPRATROPIUM BROMIDE AND ALBUTEROL SULFATE 3 MILLILITER(S): .5; 2.5 SOLUTION RESPIRATORY (INHALATION) at 05:56

## 2024-01-01 RX ADMIN — ALBUMIN HUMAN 250 MILLILITER(S): 50 SOLUTION INTRAVENOUS at 03:00

## 2024-01-01 RX ADMIN — MEROPENEM 100 MILLIGRAM(S): 500 INJECTION INTRAVENOUS at 18:43

## 2024-01-01 RX ADMIN — Medication 100 MILLIGRAM(S): at 22:59

## 2024-01-01 RX ADMIN — PANTOPRAZOLE 40 MILLIGRAM(S): 20 TABLET, DELAYED RELEASE ORAL at 11:05

## 2024-01-01 RX ADMIN — Medication 2: at 11:22

## 2024-01-01 RX ADMIN — Medication 50 MILLIGRAM(S): at 06:25

## 2024-01-01 RX ADMIN — ALBUMIN HUMAN 125 MILLILITER(S): 50 SOLUTION INTRAVENOUS at 19:00

## 2024-01-01 RX ADMIN — Medication 2.5 MILLIGRAM(S): at 01:30

## 2024-01-01 RX ADMIN — MEROPENEM 100 MILLIGRAM(S): 500 INJECTION INTRAVENOUS at 05:15

## 2024-01-01 RX ADMIN — CASPOFUNGIN ACETATE 250 MILLIGRAM(S): 5 INJECTION, POWDER, LYOPHILIZED, FOR SOLUTION INTRAVENOUS at 14:39

## 2024-01-01 RX ADMIN — MYCOPHENOLATE MOFETIL 500 MILLIGRAM(S): 200 POWDER, FOR SUSPENSION ORAL at 19:47

## 2024-01-01 RX ADMIN — URSODIOL 300 MILLIGRAM(S): 250 TABLET, FILM COATED ORAL at 05:05

## 2024-01-01 RX ADMIN — ANTISEPTIC SURGICAL SCRUB 15 MILLILITER(S): 0.04 SOLUTION TOPICAL at 05:25

## 2024-01-01 RX ADMIN — Medication 200 GRAM(S): at 20:39

## 2024-01-01 RX ADMIN — SODIUM BICARBONATE 50 MILLIEQUIVALENT(S): 42 INJECTION, SOLUTION INTRAVENOUS at 11:13

## 2024-01-01 RX ADMIN — MEROPENEM 100 MILLIGRAM(S): 500 INJECTION INTRAVENOUS at 14:34

## 2024-01-01 RX ADMIN — Medication 100 GRAM(S): at 13:30

## 2024-01-01 RX ADMIN — Medication 0.5 MILLIGRAM(S): at 11:56

## 2024-01-01 RX ADMIN — Medication 4 MILLILITER(S): at 23:29

## 2024-01-01 RX ADMIN — NOREPINEPHRINE BITARTRATE 8.78 MICROGRAM(S)/KG/MIN: 1 INJECTION, SOLUTION, CONCENTRATE INTRAVENOUS at 20:07

## 2024-01-01 RX ADMIN — Medication 2: at 05:27

## 2024-01-01 RX ADMIN — ALBUMIN HUMAN 125 MILLILITER(S): 50 SOLUTION INTRAVENOUS at 22:52

## 2024-01-01 RX ADMIN — Medication 0.25 MILLIGRAM(S): at 03:04

## 2024-01-01 RX ADMIN — ALBUMIN HUMAN 50 MILLILITER(S): 50 SOLUTION INTRAVENOUS at 05:10

## 2024-01-01 RX ADMIN — ANTISEPTIC SURGICAL SCRUB 1 APPLICATION(S): 0.04 SOLUTION TOPICAL at 05:11

## 2024-01-01 RX ADMIN — Medication 20 MILLIGRAM(S): at 09:53

## 2024-01-01 RX ADMIN — URSODIOL 300 MILLIGRAM(S): 250 TABLET, FILM COATED ORAL at 05:03

## 2024-01-01 RX ADMIN — AMPICILLIN SODIUM AND SULBACTAM SODIUM 200 GRAM(S): 2; 1 INJECTION, POWDER, FOR SOLUTION INTRAMUSCULAR; INTRAVENOUS at 12:00

## 2024-01-01 RX ADMIN — LIDOCAINE HYDROCHLORIDE 1 PATCH: 30 CREAM TOPICAL at 06:58

## 2024-01-01 RX ADMIN — ACETAMINOPHEN 200 MILLIGRAM(S): 160 SUSPENSION ORAL at 00:21

## 2024-01-01 RX ADMIN — FLUCONAZOLE 100 MILLIGRAM(S): 100 TABLET ORAL at 11:09

## 2024-01-01 RX ADMIN — IPRATROPIUM BROMIDE AND ALBUTEROL SULFATE 3 MILLILITER(S): .5; 2.5 SOLUTION RESPIRATORY (INHALATION) at 17:39

## 2024-01-01 RX ADMIN — CYCLOSPORINE 50 MILLIGRAM(S): 100 CAPSULE, GELATIN COATED ORAL at 20:44

## 2024-01-01 RX ADMIN — AMIODARONE HYDROCHLORIDE 33.3 MG/MIN: 50 INJECTION, SOLUTION INTRAVENOUS at 09:45

## 2024-01-01 RX ADMIN — Medication 200 GRAM(S): at 07:01

## 2024-01-01 RX ADMIN — MUPIROCIN 1 APPLICATION(S): 2 CREAM TOPICAL at 17:07

## 2024-01-01 RX ADMIN — Medication 15 UNIT(S): at 17:56

## 2024-01-01 RX ADMIN — PHENYLEPHRINE HYDROCHLORIDE 28 MICROGRAM(S)/KG/MIN: 10 INJECTION INTRAVENOUS at 07:35

## 2024-01-01 RX ADMIN — OXYCODONE HYDROCHLORIDE 10 MILLIGRAM(S): 30 TABLET ORAL at 15:16

## 2024-01-01 RX ADMIN — NYSTATIN 1 APPLICATION(S): 100000 POWDER TOPICAL at 05:44

## 2024-01-01 RX ADMIN — QUETIAPINE FUMARATE 50 MILLIGRAM(S): 300 TABLET ORAL at 21:03

## 2024-01-01 RX ADMIN — LEVETIRACETAM 400 MILLIGRAM(S): 750 TABLET, FILM COATED ORAL at 22:26

## 2024-01-01 RX ADMIN — URSODIOL 300 MILLIGRAM(S): 250 TABLET, FILM COATED ORAL at 05:58

## 2024-01-01 RX ADMIN — DEXMEDETOMIDINE HYDROCHLORIDE 0.7 MICROGRAM(S)/KG/HR: 4 INJECTION, SOLUTION INTRAVENOUS at 19:49

## 2024-01-01 RX ADMIN — ALBUMIN HUMAN 500 MILLILITER(S): 50 SOLUTION INTRAVENOUS at 14:22

## 2024-01-01 RX ADMIN — FENTANYL CITRATE 25 MICROGRAM(S): 50 INJECTION INTRAMUSCULAR; INTRAVENOUS at 19:15

## 2024-01-01 RX ADMIN — LEVETIRACETAM 400 MILLIGRAM(S): 750 TABLET, FILM COATED ORAL at 05:12

## 2024-01-01 RX ADMIN — MEROPENEM 100 MILLIGRAM(S): 500 INJECTION INTRAVENOUS at 13:11

## 2024-01-01 RX ADMIN — GANCICLOVIR 100 MILLIGRAM(S): 250 CAPSULE ORAL at 16:00

## 2024-01-01 RX ADMIN — NYSTATIN 1 APPLICATION(S): 100000 POWDER TOPICAL at 18:00

## 2024-01-01 RX ADMIN — ASPIRIN 81 MILLIGRAM(S): 81 TABLET, COATED ORAL at 12:29

## 2024-01-01 RX ADMIN — Medication 100 MILLIGRAM(S): at 22:44

## 2024-01-01 RX ADMIN — PHENYLEPHRINE HYDROCHLORIDE 1.87 MICROGRAM(S)/KG/MIN: 10 INJECTION INTRAVENOUS at 19:57

## 2024-01-01 RX ADMIN — ALBUMIN HUMAN 500 MILLILITER(S): 50 SOLUTION INTRAVENOUS at 05:10

## 2024-01-01 RX ADMIN — Medication 4: at 12:02

## 2024-01-01 RX ADMIN — ALBUMIN HUMAN 50 MILLILITER(S): 50 SOLUTION INTRAVENOUS at 05:03

## 2024-01-01 RX ADMIN — Medication 50 MILLIGRAM(S): at 17:47

## 2024-01-01 RX ADMIN — AMIODARONE HYDROCHLORIDE 16.7 MG/MIN: 50 INJECTION, SOLUTION INTRAVENOUS at 19:39

## 2024-01-01 RX ADMIN — LEVETIRACETAM 400 MILLIGRAM(S): 750 TABLET, FILM COATED ORAL at 17:54

## 2024-01-01 RX ADMIN — Medication 100 MILLIGRAM(S): at 05:47

## 2024-01-01 RX ADMIN — Medication 2: at 18:22

## 2024-01-01 RX ADMIN — POTASSIUM CHLORIDE 20 MILLIEQUIVALENT(S): 750 TABLET, EXTENDED RELEASE ORAL at 17:49

## 2024-01-01 RX ADMIN — Medication 6: at 11:07

## 2024-01-01 RX ADMIN — AMIODARONE HYDROCHLORIDE 16.7 MG/MIN: 50 INJECTION, SOLUTION INTRAVENOUS at 19:41

## 2024-01-01 RX ADMIN — Medication 2000 MILLILITER(S): at 14:17

## 2024-01-01 RX ADMIN — OXYCODONE HYDROCHLORIDE 5 MILLIGRAM(S): 30 TABLET ORAL at 10:49

## 2024-01-01 RX ADMIN — Medication 4 UNIT(S): at 06:59

## 2024-01-01 RX ADMIN — Medication 200 GRAM(S): at 11:59

## 2024-01-01 RX ADMIN — MEROPENEM 100 MILLIGRAM(S): 500 INJECTION INTRAVENOUS at 06:28

## 2024-01-01 RX ADMIN — APIXABAN 2.5 MILLIGRAM(S): 5 TABLET, FILM COATED ORAL at 18:04

## 2024-01-01 RX ADMIN — FLUCONAZOLE 100 MILLIGRAM(S): 100 TABLET ORAL at 13:13

## 2024-01-01 RX ADMIN — ASPIRIN 81 MILLIGRAM(S): 81 TABLET, COATED ORAL at 22:45

## 2024-01-01 RX ADMIN — ALBUMIN HUMAN 50 MILLILITER(S): 50 SOLUTION INTRAVENOUS at 18:29

## 2024-01-01 RX ADMIN — Medication 50 MILLIGRAM(S): at 13:11

## 2024-01-01 RX ADMIN — ASPIRIN 81 MILLIGRAM(S): 81 TABLET, COATED ORAL at 11:59

## 2024-01-01 RX ADMIN — Medication 2: at 23:24

## 2024-01-01 RX ADMIN — OXYCODONE HYDROCHLORIDE 2.5 MILLIGRAM(S): 30 TABLET ORAL at 12:10

## 2024-01-01 RX ADMIN — BUDESONIDE 0.5 MILLIGRAM(S): 9 TABLET, EXTENDED RELEASE ORAL at 17:57

## 2024-01-01 RX ADMIN — Medication 25 MILLIGRAM(S): at 05:34

## 2024-01-01 RX ADMIN — ASPIRIN 81 MILLIGRAM(S): 81 TABLET, COATED ORAL at 11:04

## 2024-01-01 RX ADMIN — Medication 5 MILLIGRAM(S): at 12:37

## 2024-01-01 RX ADMIN — Medication 125 MICROGRAM(S): at 11:04

## 2024-01-01 RX ADMIN — PREDNISONE 20 MILLIGRAM(S): 5 TABLET ORAL at 05:43

## 2024-01-01 RX ADMIN — NYSTATIN 1 APPLICATION(S): 100000 POWDER TOPICAL at 22:40

## 2024-01-01 RX ADMIN — SODIUM PHOSPHATE, DIBASIC, ANHYDROUS, POTASSIUM PHOSPHATE, MONOBASIC, AND SODIUM PHOSPHATE, MONOBASIC, MONOHYDRATE 2 TABLET(S): 852; 155; 130 TABLET, COATED ORAL at 08:12

## 2024-01-01 RX ADMIN — Medication 4 MILLILITER(S): at 23:11

## 2024-01-01 RX ADMIN — Medication 4: at 16:33

## 2024-01-01 RX ADMIN — Medication 50 MILLIGRAM(S): at 21:13

## 2024-01-01 RX ADMIN — Medication 100 MILLIGRAM(S): at 06:19

## 2024-01-01 RX ADMIN — Medication 5 MILLIGRAM(S): at 10:36

## 2024-01-01 RX ADMIN — Medication 255 MILLIMOLE(S): at 20:02

## 2024-01-01 RX ADMIN — SODIUM BICARBONATE 125 MEQ/KG/HR: 42 INJECTION, SOLUTION INTRAVENOUS at 16:45

## 2024-01-01 RX ADMIN — IPRATROPIUM BROMIDE AND ALBUTEROL SULFATE 3 MILLILITER(S): .5; 2.5 SOLUTION RESPIRATORY (INHALATION) at 11:13

## 2024-01-01 RX ADMIN — PANTOPRAZOLE 40 MILLIGRAM(S): 20 TABLET, DELAYED RELEASE ORAL at 11:58

## 2024-01-01 RX ADMIN — ALBUMIN HUMAN 50 MILLILITER(S): 50 SOLUTION INTRAVENOUS at 17:48

## 2024-01-01 RX ADMIN — CYCLOSPORINE 50 MILLIGRAM(S): 100 CAPSULE, GELATIN COATED ORAL at 20:40

## 2024-01-01 RX ADMIN — Medication 14 UNIT(S): at 17:55

## 2024-01-01 RX ADMIN — Medication 6: at 12:17

## 2024-01-01 RX ADMIN — CYCLOSPORINE 50 MILLIGRAM(S): 100 CAPSULE, GELATIN COATED ORAL at 21:16

## 2024-01-01 RX ADMIN — PIPERACILLIN SODIUM AND TAZOBACTAM SODIUM 25 GRAM(S): 2; 250 INJECTION, POWDER, FOR SOLUTION INTRAVENOUS at 06:47

## 2024-01-01 RX ADMIN — ALBUMIN HUMAN 50 MILLILITER(S): 50 SOLUTION INTRAVENOUS at 18:04

## 2024-01-01 RX ADMIN — ACETAMINOPHEN 500 MILLIGRAM(S): 160 SUSPENSION ORAL at 11:45

## 2024-01-01 RX ADMIN — ANTISEPTIC SURGICAL SCRUB 1 APPLICATION(S): 0.04 SOLUTION TOPICAL at 05:13

## 2024-01-01 RX ADMIN — SULFAMETHOXAZOLE AND TRIMETHOPRIM 266.25 MILLIGRAM(S): 400; 80 TABLET ORAL at 09:57

## 2024-01-01 RX ADMIN — Medication 100 MILLILITER(S): at 07:57

## 2024-01-01 RX ADMIN — Medication 12.5 MILLIGRAM(S): at 05:44

## 2024-01-01 RX ADMIN — HYDROMORPHONE HYDROCHLORIDE 0.5 MILLIGRAM(S): 4 INJECTION, SOLUTION INTRAMUSCULAR; INTRAVENOUS; SUBCUTANEOUS at 07:35

## 2024-01-01 RX ADMIN — POTASSIUM PHOSPHATE, MONOBASIC POTASSIUM PHOSPHATE, DIBASIC 83.33 MILLIMOLE(S): 236; 224 INJECTION, SOLUTION INTRAVENOUS at 00:40

## 2024-01-01 RX ADMIN — CASPOFUNGIN ACETATE 250 MILLIGRAM(S): 5 INJECTION, POWDER, LYOPHILIZED, FOR SOLUTION INTRAVENOUS at 13:41

## 2024-01-01 RX ADMIN — VALGANCICLOVIR HYDROCHLORIDE 900 MILLIGRAM(S): 50 POWDER, FOR SOLUTION ORAL at 11:06

## 2024-01-01 RX ADMIN — PROPOFOL 2.81 MICROGRAM(S)/KG/MIN: 10 INJECTION, EMULSION INTRAVENOUS at 07:52

## 2024-01-01 RX ADMIN — VALGANCICLOVIR HYDROCHLORIDE 450 MILLIGRAM(S): 50 POWDER, FOR SOLUTION ORAL at 12:05

## 2024-01-01 RX ADMIN — Medication 1 TABLET(S): at 19:42

## 2024-01-01 RX ADMIN — Medication 1 ENEMA: at 04:17

## 2024-01-01 RX ADMIN — APIXABAN 5 MILLIGRAM(S): 5 TABLET, FILM COATED ORAL at 17:46

## 2024-01-01 RX ADMIN — Medication 4 MILLILITER(S): at 11:34

## 2024-01-01 RX ADMIN — ALBUMIN HUMAN 50 MILLILITER(S): 50 SOLUTION INTRAVENOUS at 06:25

## 2024-01-01 RX ADMIN — NYSTATIN 1 APPLICATION(S): 100000 POWDER TOPICAL at 21:22

## 2024-01-01 RX ADMIN — ANTISEPTIC SURGICAL SCRUB 15 MILLILITER(S): 0.04 SOLUTION TOPICAL at 18:38

## 2024-01-01 RX ADMIN — ACETAMINOPHEN 200 MILLIGRAM(S): 160 SUSPENSION ORAL at 09:49

## 2024-01-01 RX ADMIN — Medication 4: at 06:57

## 2024-01-01 RX ADMIN — ALBUMIN HUMAN 500 MILLILITER(S): 50 SOLUTION INTRAVENOUS at 14:51

## 2024-01-01 RX ADMIN — Medication 4 MILLILITER(S): at 11:23

## 2024-01-01 RX ADMIN — BUDESONIDE 0.5 MILLIGRAM(S): 9 TABLET, EXTENDED RELEASE ORAL at 17:26

## 2024-01-01 RX ADMIN — OXYCODONE HYDROCHLORIDE 5 MILLIGRAM(S): 30 TABLET ORAL at 01:30

## 2024-01-01 RX ADMIN — Medication 125 MICROGRAM(S): at 11:30

## 2024-01-01 RX ADMIN — Medication 5 MILLIGRAM(S): at 19:33

## 2024-01-01 RX ADMIN — LEVETIRACETAM 400 MILLIGRAM(S): 750 TABLET, FILM COATED ORAL at 05:16

## 2024-01-01 RX ADMIN — MYCOPHENOLATE MOFETIL 1000 MILLIGRAM(S): 200 POWDER, FOR SUSPENSION ORAL at 07:51

## 2024-01-01 RX ADMIN — MEROPENEM 100 MILLIGRAM(S): 500 INJECTION INTRAVENOUS at 05:02

## 2024-01-01 RX ADMIN — HYDROMORPHONE HYDROCHLORIDE 0.5 MILLIGRAM(S): 4 INJECTION, SOLUTION INTRAMUSCULAR; INTRAVENOUS; SUBCUTANEOUS at 12:08

## 2024-01-01 RX ADMIN — Medication 200 GRAM(S): at 05:31

## 2024-01-01 RX ADMIN — HYDROMORPHONE HYDROCHLORIDE 1 MILLIGRAM(S): 4 INJECTION, SOLUTION INTRAMUSCULAR; INTRAVENOUS; SUBCUTANEOUS at 09:16

## 2024-01-01 RX ADMIN — HYDROMORPHONE HYDROCHLORIDE 1 MILLIGRAM(S): 4 INJECTION, SOLUTION INTRAMUSCULAR; INTRAVENOUS; SUBCUTANEOUS at 22:27

## 2024-01-01 RX ADMIN — HYDROMORPHONE HYDROCHLORIDE 1 MILLIGRAM(S): 4 INJECTION, SOLUTION INTRAMUSCULAR; INTRAVENOUS; SUBCUTANEOUS at 01:15

## 2024-01-01 RX ADMIN — PANTOPRAZOLE 40 MILLIGRAM(S): 20 TABLET, DELAYED RELEASE ORAL at 05:55

## 2024-01-01 RX ADMIN — Medication 5 UNIT(S): at 18:43

## 2024-01-01 RX ADMIN — Medication 2: at 18:35

## 2024-01-01 RX ADMIN — Medication 20 MILLILITER(S): at 05:00

## 2024-01-01 RX ADMIN — HYDROMORPHONE HYDROCHLORIDE 1 MILLIGRAM(S): 4 INJECTION, SOLUTION INTRAMUSCULAR; INTRAVENOUS; SUBCUTANEOUS at 12:30

## 2024-01-01 RX ADMIN — Medication 4: at 22:11

## 2024-01-01 RX ADMIN — ANTISEPTIC SURGICAL SCRUB 15 MILLILITER(S): 0.04 SOLUTION TOPICAL at 17:36

## 2024-01-01 RX ADMIN — Medication 1 MILLIGRAM(S): at 21:58

## 2024-01-01 RX ADMIN — VALGANCICLOVIR HYDROCHLORIDE 900 MILLIGRAM(S): 50 POWDER, FOR SOLUTION ORAL at 11:12

## 2024-01-01 RX ADMIN — PANTOPRAZOLE 40 MILLIGRAM(S): 20 TABLET, DELAYED RELEASE ORAL at 21:10

## 2024-01-01 RX ADMIN — Medication 50 MILLIGRAM(S): at 17:24

## 2024-01-01 RX ADMIN — MEROPENEM 100 MILLIGRAM(S): 500 INJECTION INTRAVENOUS at 13:57

## 2024-01-01 RX ADMIN — IPRATROPIUM BROMIDE AND ALBUTEROL SULFATE 3 MILLILITER(S): .5; 2.5 SOLUTION RESPIRATORY (INHALATION) at 11:34

## 2024-01-01 RX ADMIN — HYDROMORPHONE HYDROCHLORIDE 0.5 MILLIGRAM(S): 4 INJECTION, SOLUTION INTRAMUSCULAR; INTRAVENOUS; SUBCUTANEOUS at 12:57

## 2024-01-01 RX ADMIN — Medication 50 MILLIGRAM(S): at 17:16

## 2024-01-01 RX ADMIN — SODIUM CHLORIDE 84 MILLILITER(S): 9 INJECTION, SOLUTION INTRAVENOUS at 14:29

## 2024-01-01 RX ADMIN — SODIUM CHLORIDE 30 MILLILITER(S): 9 INJECTION, SOLUTION INTRAVENOUS at 10:47

## 2024-01-01 RX ADMIN — Medication 50 GRAM(S): at 22:45

## 2024-01-01 RX ADMIN — Medication 4 MILLILITER(S): at 05:01

## 2024-01-01 RX ADMIN — Medication 63.75 MILLIMOLE(S): at 12:33

## 2024-01-01 RX ADMIN — Medication 6: at 17:16

## 2024-01-01 RX ADMIN — Medication 2 TABLET(S): at 21:26

## 2024-01-01 RX ADMIN — PHENYLEPHRINE HYDROCHLORIDE 3.73 MICROGRAM(S)/KG/MIN: 10 INJECTION INTRAVENOUS at 07:25

## 2024-01-01 RX ADMIN — Medication 18 UNIT(S): at 23:30

## 2024-01-01 RX ADMIN — IPRATROPIUM BROMIDE AND ALBUTEROL SULFATE 3 MILLILITER(S): .5; 2.5 SOLUTION RESPIRATORY (INHALATION) at 23:18

## 2024-01-01 RX ADMIN — Medication 100 MILLIGRAM(S): at 19:35

## 2024-01-01 RX ADMIN — DEXMEDETOMIDINE HYDROCHLORIDE 35.1 MICROGRAM(S)/KG/HR: 4 INJECTION, SOLUTION INTRAVENOUS at 07:38

## 2024-01-01 RX ADMIN — POTASSIUM CHLORIDE 100 MILLIEQUIVALENT(S): 750 TABLET, EXTENDED RELEASE ORAL at 11:32

## 2024-01-01 RX ADMIN — LEVETIRACETAM 400 MILLIGRAM(S): 750 TABLET, FILM COATED ORAL at 17:38

## 2024-01-01 RX ADMIN — PANTOPRAZOLE 40 MILLIGRAM(S): 20 TABLET, DELAYED RELEASE ORAL at 11:50

## 2024-01-01 RX ADMIN — MEROPENEM 100 MILLIGRAM(S): 500 INJECTION INTRAVENOUS at 13:10

## 2024-01-01 RX ADMIN — ALBUMIN HUMAN 3000 MILLILITER(S): 50 SOLUTION INTRAVENOUS at 10:00

## 2024-01-01 RX ADMIN — BUDESONIDE 0.5 MILLIGRAM(S): 9 TABLET, EXTENDED RELEASE ORAL at 05:17

## 2024-01-01 RX ADMIN — Medication 255 MILLIMOLE(S): at 02:42

## 2024-01-01 RX ADMIN — MEROPENEM 100 MILLIGRAM(S): 500 INJECTION INTRAVENOUS at 18:15

## 2024-01-01 RX ADMIN — MEROPENEM 100 MILLIGRAM(S): 500 INJECTION INTRAVENOUS at 16:56

## 2024-01-01 RX ADMIN — Medication 2: at 12:46

## 2024-01-01 RX ADMIN — Medication 2: at 05:40

## 2024-01-01 RX ADMIN — Medication 5 MILLIGRAM(S): at 12:17

## 2024-01-01 RX ADMIN — Medication 4: at 12:00

## 2024-01-01 RX ADMIN — ALBUMIN HUMAN 250 MILLILITER(S): 50 SOLUTION INTRAVENOUS at 09:25

## 2024-01-01 RX ADMIN — MEROPENEM 100 MILLIGRAM(S): 500 INJECTION INTRAVENOUS at 06:04

## 2024-01-01 RX ADMIN — NOREPINEPHRINE BITARTRATE 9.33 MICROGRAM(S)/KG/MIN: 1 INJECTION, SOLUTION, CONCENTRATE INTRAVENOUS at 07:44

## 2024-01-01 RX ADMIN — Medication 1 TABLET(S): at 05:33

## 2024-01-01 RX ADMIN — Medication 5.62 MICROGRAM(S)/KG/MIN: at 13:10

## 2024-01-01 RX ADMIN — FENTANYL CITRATE 25 MICROGRAM(S): 50 INJECTION INTRAMUSCULAR; INTRAVENOUS at 17:05

## 2024-01-01 RX ADMIN — Medication 100 MILLILITER(S): at 22:51

## 2024-01-01 RX ADMIN — ALBUMIN HUMAN 250 MILLILITER(S): 50 SOLUTION INTRAVENOUS at 11:42

## 2024-01-01 RX ADMIN — PROPOFOL 2.81 MICROGRAM(S)/KG/MIN: 10 INJECTION, EMULSION INTRAVENOUS at 16:22

## 2024-01-01 RX ADMIN — PANTOPRAZOLE 40 MILLIGRAM(S): 20 TABLET, DELAYED RELEASE ORAL at 12:39

## 2024-01-01 RX ADMIN — GANCICLOVIR 100 MILLIGRAM(S): 250 CAPSULE ORAL at 15:43

## 2024-01-01 RX ADMIN — ASPIRIN 81 MILLIGRAM(S): 81 TABLET, COATED ORAL at 11:24

## 2024-01-01 RX ADMIN — HYDROMORPHONE HYDROCHLORIDE 1 MILLIGRAM(S): 4 INJECTION, SOLUTION INTRAMUSCULAR; INTRAVENOUS; SUBCUTANEOUS at 22:55

## 2024-01-01 RX ADMIN — ALBUMIN HUMAN 125 MILLILITER(S): 50 SOLUTION INTRAVENOUS at 11:44

## 2024-01-01 RX ADMIN — Medication 100 MILLIGRAM(S): at 18:46

## 2024-01-01 RX ADMIN — Medication 100 MILLIGRAM(S): at 01:05

## 2024-01-01 RX ADMIN — Medication 125 MICROGRAM(S): at 11:05

## 2024-01-01 RX ADMIN — Medication 100 MILLIGRAM(S): at 05:34

## 2024-01-01 RX ADMIN — MEROPENEM 100 MILLIGRAM(S): 500 INJECTION INTRAVENOUS at 17:08

## 2024-01-01 RX ADMIN — ATOVAQUONE 1500 MILLIGRAM(S): 750 SUSPENSION ORAL at 11:51

## 2024-01-01 RX ADMIN — Medication 60 MILLIGRAM(S): at 05:02

## 2024-01-01 RX ADMIN — ALBUMIN HUMAN 125 MILLILITER(S): 50 SOLUTION INTRAVENOUS at 05:32

## 2024-01-01 RX ADMIN — PIPERACILLIN SODIUM AND TAZOBACTAM SODIUM 25 GRAM(S): 2; 250 INJECTION, POWDER, FOR SOLUTION INTRAVENOUS at 22:51

## 2024-01-01 RX ADMIN — SODIUM CHLORIDE 50 MILLILITER(S): 9 INJECTION, SOLUTION INTRAVENOUS at 22:36

## 2024-01-01 RX ADMIN — POTASSIUM PHOSPHATE, MONOBASIC POTASSIUM PHOSPHATE, DIBASIC 62.5 MILLIMOLE(S): 236; 224 INJECTION, SOLUTION INTRAVENOUS at 13:09

## 2024-01-01 RX ADMIN — OXYCODONE HYDROCHLORIDE 2.5 MILLIGRAM(S): 30 TABLET ORAL at 07:00

## 2024-01-01 RX ADMIN — SULFAMETHOXAZOLE AND TRIMETHOPRIM 261.88 MILLIGRAM(S): 400; 80 TABLET ORAL at 03:52

## 2024-01-01 RX ADMIN — Medication 1 MILLIGRAM(S): at 21:23

## 2024-01-01 RX ADMIN — BUDESONIDE 0.5 MILLIGRAM(S): 9 TABLET, EXTENDED RELEASE ORAL at 05:02

## 2024-01-01 RX ADMIN — NYSTATIN 1 APPLICATION(S): 100000 POWDER TOPICAL at 05:24

## 2024-01-01 RX ADMIN — Medication 100 MILLIGRAM(S): at 09:44

## 2024-01-01 RX ADMIN — Medication 5000 UNIT(S): at 17:24

## 2024-01-01 RX ADMIN — Medication 2: at 19:02

## 2024-01-01 RX ADMIN — Medication 2: at 05:24

## 2024-01-01 RX ADMIN — LEVETIRACETAM 400 MILLIGRAM(S): 750 TABLET, FILM COATED ORAL at 17:17

## 2024-01-01 RX ADMIN — Medication 200 GRAM(S): at 06:57

## 2024-01-01 RX ADMIN — Medication 2: at 23:30

## 2024-01-01 RX ADMIN — Medication 14 MICROGRAM(S)/KG/MIN: at 19:40

## 2024-01-01 RX ADMIN — ANTISEPTIC SURGICAL SCRUB 15 MILLILITER(S): 0.04 SOLUTION TOPICAL at 17:44

## 2024-01-01 RX ADMIN — ASPIRIN 81 MILLIGRAM(S): 81 TABLET, COATED ORAL at 12:59

## 2024-01-01 RX ADMIN — Medication 100 MILLIGRAM(S): at 06:36

## 2024-01-01 RX ADMIN — ACETAMINOPHEN 200 MILLIGRAM(S): 160 SUSPENSION ORAL at 11:24

## 2024-01-01 RX ADMIN — Medication 2: at 17:24

## 2024-01-01 RX ADMIN — Medication 500 MICROGRAM(S): at 13:30

## 2024-01-01 RX ADMIN — PANTOPRAZOLE 40 MILLIGRAM(S): 20 TABLET, DELAYED RELEASE ORAL at 17:38

## 2024-01-01 RX ADMIN — MYCOPHENOLATE MOFETIL 41.67 MILLIGRAM(S): 200 POWDER, FOR SUSPENSION ORAL at 10:38

## 2024-01-01 RX ADMIN — Medication 100 MILLIGRAM(S): at 17:25

## 2024-01-01 RX ADMIN — MYCOPHENOLATE MOFETIL 1000 MILLIGRAM(S): 200 POWDER, FOR SUSPENSION ORAL at 08:29

## 2024-01-01 RX ADMIN — POTASSIUM CHLORIDE 100 MILLIEQUIVALENT(S): 750 TABLET, EXTENDED RELEASE ORAL at 11:41

## 2024-01-01 RX ADMIN — Medication 4: at 23:14

## 2024-01-01 RX ADMIN — Medication 12.5 MILLIGRAM(S): at 21:14

## 2024-01-01 RX ADMIN — Medication 5 MILLIGRAM(S): at 17:16

## 2024-01-01 RX ADMIN — SODIUM BICARBONATE 50 MILLIEQUIVALENT(S): 42 INJECTION, SOLUTION INTRAVENOUS at 20:48

## 2024-01-01 RX ADMIN — HYDROMORPHONE HYDROCHLORIDE 1 MILLIGRAM(S): 4 INJECTION, SOLUTION INTRAMUSCULAR; INTRAVENOUS; SUBCUTANEOUS at 23:55

## 2024-01-01 RX ADMIN — LIDOCAINE HYDROCHLORIDE 1 PATCH: 30 CREAM TOPICAL at 03:28

## 2024-01-01 RX ADMIN — SODIUM BICARBONATE 50 MILLIEQUIVALENT(S): 42 INJECTION, SOLUTION INTRAVENOUS at 11:23

## 2024-01-01 RX ADMIN — AMIODARONE HYDROCHLORIDE 600 MILLIGRAM(S): 50 INJECTION, SOLUTION INTRAVENOUS at 13:32

## 2024-01-01 RX ADMIN — ALBUMIN HUMAN 125 MILLILITER(S): 50 SOLUTION INTRAVENOUS at 18:51

## 2024-01-01 RX ADMIN — Medication 1 UNIT(S)/HR: at 19:51

## 2024-01-01 RX ADMIN — HYDROMORPHONE HYDROCHLORIDE 1 MILLIGRAM(S): 4 INJECTION, SOLUTION INTRAMUSCULAR; INTRAVENOUS; SUBCUTANEOUS at 10:29

## 2024-01-01 RX ADMIN — POLYETHYLENE GLYCOL 3350 17 GRAM(S): 17 POWDER, FOR SOLUTION ORAL at 12:05

## 2024-01-01 RX ADMIN — IPRATROPIUM BROMIDE AND ALBUTEROL SULFATE 3 MILLILITER(S): .5; 2.5 SOLUTION RESPIRATORY (INHALATION) at 08:32

## 2024-01-01 RX ADMIN — PREDNISONE 20 MILLIGRAM(S): 5 TABLET ORAL at 10:11

## 2024-01-01 RX ADMIN — IPRATROPIUM BROMIDE AND ALBUTEROL SULFATE 3 MILLILITER(S): .5; 2.5 SOLUTION RESPIRATORY (INHALATION) at 17:45

## 2024-01-01 RX ADMIN — LINEZOLID 300 MILLIGRAM(S): 600 INJECTION, SOLUTION INTRAVENOUS at 20:46

## 2024-01-01 RX ADMIN — PANTOPRAZOLE 40 MILLIGRAM(S): 20 TABLET, DELAYED RELEASE ORAL at 00:40

## 2024-01-01 RX ADMIN — Medication 4.5 UNIT(S)/MIN: at 08:48

## 2024-01-01 RX ADMIN — HYDROMORPHONE HYDROCHLORIDE 1 MILLIGRAM(S): 4 INJECTION, SOLUTION INTRAMUSCULAR; INTRAVENOUS; SUBCUTANEOUS at 03:52

## 2024-01-01 RX ADMIN — Medication 1 TABLET(S): at 17:59

## 2024-01-01 RX ADMIN — Medication 50 MILLIGRAM(S): at 21:07

## 2024-01-01 RX ADMIN — QUETIAPINE FUMARATE 12.5 MILLIGRAM(S): 300 TABLET ORAL at 00:52

## 2024-01-01 RX ADMIN — FLUCONAZOLE 400 MILLIGRAM(S): 100 TABLET ORAL at 12:00

## 2024-01-01 RX ADMIN — ALBUMIN HUMAN 125 MILLILITER(S): 50 SOLUTION INTRAVENOUS at 08:56

## 2024-01-01 RX ADMIN — Medication 15 UNIT(S): at 05:31

## 2024-01-01 RX ADMIN — Medication 255 MILLIMOLE(S): at 03:46

## 2024-01-01 RX ADMIN — MYCOPHENOLATE MOFETIL 500 MILLIGRAM(S): 200 POWDER, FOR SUSPENSION ORAL at 09:40

## 2024-01-01 RX ADMIN — POTASSIUM PHOSPHATE, MONOBASIC POTASSIUM PHOSPHATE, DIBASIC 62.5 MILLIMOLE(S): 236; 224 INJECTION, SOLUTION INTRAVENOUS at 19:55

## 2024-01-01 RX ADMIN — Medication 2: at 18:21

## 2024-01-01 RX ADMIN — FENTANYL CITRATE 1 PATCH: 50 INJECTION INTRAMUSCULAR; INTRAVENOUS at 19:37

## 2024-01-01 RX ADMIN — URSODIOL 300 MILLIGRAM(S): 250 TABLET, FILM COATED ORAL at 05:39

## 2024-01-01 RX ADMIN — Medication 15 UNIT(S): at 18:22

## 2024-01-01 RX ADMIN — IPRATROPIUM BROMIDE AND ALBUTEROL SULFATE 3 MILLILITER(S): .5; 2.5 SOLUTION RESPIRATORY (INHALATION) at 17:36

## 2024-01-01 RX ADMIN — ACETAMINOPHEN 500 MILLIGRAM(S): 160 SUSPENSION ORAL at 11:44

## 2024-01-01 RX ADMIN — MIDODRINE HYDROCHLORIDE 10 MILLIGRAM(S): 5 TABLET ORAL at 13:06

## 2024-01-01 RX ADMIN — TAMSULOSIN HYDROCHLORIDE 0.4 MILLIGRAM(S): 0.4 CAPSULE ORAL at 21:29

## 2024-01-01 RX ADMIN — ACETAMINOPHEN, DIPHENHYDRAMINE HCL, PHENYLEPHRINE HCL 5 MILLIGRAM(S): 325; 25; 5 TABLET ORAL at 21:26

## 2024-01-01 RX ADMIN — NOREPINEPHRINE BITARTRATE 140 MICROGRAM(S)/KG/MIN: 1 INJECTION, SOLUTION, CONCENTRATE INTRAVENOUS at 22:05

## 2024-01-01 RX ADMIN — BUDESONIDE 0.5 MILLIGRAM(S): 9 TABLET, EXTENDED RELEASE ORAL at 17:38

## 2024-01-01 RX ADMIN — ANTISEPTIC SURGICAL SCRUB 15 MILLILITER(S): 0.04 SOLUTION TOPICAL at 17:39

## 2024-01-01 RX ADMIN — ACETAMINOPHEN 650 MILLIGRAM(S): 160 SUSPENSION ORAL at 12:40

## 2024-01-01 RX ADMIN — Medication 4: at 18:35

## 2024-01-01 RX ADMIN — FENTANYL CITRATE 1 PATCH: 50 INJECTION INTRAMUSCULAR; INTRAVENOUS at 19:53

## 2024-01-01 RX ADMIN — Medication 50 MILLIGRAM(S): at 14:13

## 2024-01-01 RX ADMIN — ALBUMIN HUMAN 125 MILLILITER(S): 50 SOLUTION INTRAVENOUS at 17:05

## 2024-01-01 RX ADMIN — Medication 5000 UNIT(S): at 06:11

## 2024-01-01 RX ADMIN — BUDESONIDE 0.5 MILLIGRAM(S): 9 TABLET, EXTENDED RELEASE ORAL at 05:46

## 2024-01-01 RX ADMIN — PANTOPRAZOLE 10 MG/HR: 20 TABLET, DELAYED RELEASE ORAL at 08:21

## 2024-01-01 RX ADMIN — LEVETIRACETAM 400 MILLIGRAM(S): 750 TABLET, FILM COATED ORAL at 05:15

## 2024-01-01 RX ADMIN — Medication 50 MILLIGRAM(S): at 23:41

## 2024-01-01 RX ADMIN — INSULIN GLARGINE-YFGN 18 UNIT(S): 100 INJECTION, SOLUTION SUBCUTANEOUS at 22:49

## 2024-01-01 RX ADMIN — Medication 100 MILLIGRAM(S): at 09:41

## 2024-01-01 RX ADMIN — HYDROMORPHONE HYDROCHLORIDE 1 MILLIGRAM(S): 4 INJECTION, SOLUTION INTRAMUSCULAR; INTRAVENOUS; SUBCUTANEOUS at 09:12

## 2024-01-01 RX ADMIN — Medication 10 MILLILITER(S): at 21:33

## 2024-01-01 RX ADMIN — NOREPINEPHRINE BITARTRATE 13.1 MICROGRAM(S)/KG/MIN: 1 INJECTION, SOLUTION, CONCENTRATE INTRAVENOUS at 07:40

## 2024-01-01 RX ADMIN — PANTOPRAZOLE 40 MILLIGRAM(S): 20 TABLET, DELAYED RELEASE ORAL at 23:24

## 2024-01-01 RX ADMIN — Medication 40 MILLIGRAM(S): at 05:33

## 2024-01-01 RX ADMIN — Medication 10 MILLILITER(S): at 22:16

## 2024-01-01 RX ADMIN — ANTISEPTIC SURGICAL SCRUB 15 MILLILITER(S): 0.04 SOLUTION TOPICAL at 05:27

## 2024-01-01 RX ADMIN — Medication 5.62 MICROGRAM(S)/KG/MIN: at 09:33

## 2024-01-01 RX ADMIN — VALGANCICLOVIR HYDROCHLORIDE 900 MILLIGRAM(S): 50 POWDER, FOR SOLUTION ORAL at 12:00

## 2024-01-01 RX ADMIN — IPRATROPIUM BROMIDE AND ALBUTEROL SULFATE 3 MILLILITER(S): .5; 2.5 SOLUTION RESPIRATORY (INHALATION) at 23:06

## 2024-01-01 RX ADMIN — NOREPINEPHRINE BITARTRATE 8.78 MICROGRAM(S)/KG/MIN: 1 INJECTION, SOLUTION, CONCENTRATE INTRAVENOUS at 19:29

## 2024-01-01 RX ADMIN — PHENYLEPHRINE HYDROCHLORIDE 28 MICROGRAM(S)/KG/MIN: 10 INJECTION INTRAVENOUS at 19:42

## 2024-01-01 RX ADMIN — NYSTATIN 1 APPLICATION(S): 100000 POWDER TOPICAL at 17:37

## 2024-01-01 RX ADMIN — ACETAMINOPHEN 500 MILLIGRAM(S): 160 SUSPENSION ORAL at 20:59

## 2024-01-01 RX ADMIN — HYDROMORPHONE HYDROCHLORIDE 0.5 MILLIGRAM(S): 4 INJECTION, SOLUTION INTRAMUSCULAR; INTRAVENOUS; SUBCUTANEOUS at 08:36

## 2024-01-01 RX ADMIN — Medication 40 MILLIGRAM(S): at 21:32

## 2024-01-01 RX ADMIN — SULFAMETHOXAZOLE AND TRIMETHOPRIM 210 MILLIGRAM(S): 400; 80 TABLET ORAL at 16:33

## 2024-01-01 RX ADMIN — CYCLOSPORINE 25 MILLIGRAM(S): 100 CAPSULE, GELATIN COATED ORAL at 07:42

## 2024-01-01 RX ADMIN — Medication 50 MILLIGRAM(S): at 21:30

## 2024-01-01 RX ADMIN — Medication 200 GRAM(S): at 07:05

## 2024-01-01 RX ADMIN — BUDESONIDE 0.5 MILLIGRAM(S): 9 TABLET, EXTENDED RELEASE ORAL at 17:42

## 2024-01-01 RX ADMIN — ALBUMIN HUMAN 1000 MILLILITER(S): 50 SOLUTION INTRAVENOUS at 10:46

## 2024-01-01 RX ADMIN — Medication 4: at 08:11

## 2024-01-01 RX ADMIN — IPRATROPIUM BROMIDE AND ALBUTEROL SULFATE 3 MILLILITER(S): .5; 2.5 SOLUTION RESPIRATORY (INHALATION) at 17:37

## 2024-01-01 RX ADMIN — ALBUMIN HUMAN 125 MILLILITER(S): 50 SOLUTION INTRAVENOUS at 12:30

## 2024-01-01 RX ADMIN — CYCLOSPORINE 25 MILLIGRAM(S): 100 CAPSULE, GELATIN COATED ORAL at 21:54

## 2024-01-01 RX ADMIN — ANTISEPTIC SURGICAL SCRUB 15 MILLILITER(S): 0.04 SOLUTION TOPICAL at 05:44

## 2024-01-01 RX ADMIN — URSODIOL 300 MILLIGRAM(S): 250 TABLET, FILM COATED ORAL at 17:51

## 2024-01-01 RX ADMIN — MUPIROCIN 1 APPLICATION(S): 2 CREAM TOPICAL at 05:59

## 2024-01-01 RX ADMIN — Medication 40 MILLIGRAM(S): at 11:16

## 2024-01-01 RX ADMIN — PREDNISONE 20 MILLIGRAM(S): 5 TABLET ORAL at 05:04

## 2024-01-01 RX ADMIN — Medication 125 MICROGRAM(S): at 11:52

## 2024-01-01 RX ADMIN — ACETAMINOPHEN 200 MILLIGRAM(S): 160 SUSPENSION ORAL at 06:22

## 2024-01-01 RX ADMIN — ANTISEPTIC SURGICAL SCRUB 1 APPLICATION(S): 0.04 SOLUTION TOPICAL at 05:25

## 2024-01-01 RX ADMIN — Medication 2 UNIT(S)/HR: at 19:41

## 2024-01-01 RX ADMIN — Medication 100 MILLIGRAM(S): at 17:27

## 2024-01-01 RX ADMIN — PANTOPRAZOLE 40 MILLIGRAM(S): 20 TABLET, DELAYED RELEASE ORAL at 23:06

## 2024-01-01 RX ADMIN — SODIUM CHLORIDE 84 MILLILITER(S): 9 INJECTION, SOLUTION INTRAVENOUS at 23:24

## 2024-01-01 RX ADMIN — HYDROMORPHONE HYDROCHLORIDE 1 MILLIGRAM(S): 4 INJECTION, SOLUTION INTRAMUSCULAR; INTRAVENOUS; SUBCUTANEOUS at 03:37

## 2024-01-01 RX ADMIN — SODIUM CHLORIDE 50 MILLILITER(S): 9 INJECTION, SOLUTION INTRAVENOUS at 19:57

## 2024-01-01 RX ADMIN — FENTANYL CITRATE 25 MICROGRAM(S): 50 INJECTION INTRAMUSCULAR; INTRAVENOUS at 08:06

## 2024-01-01 RX ADMIN — IPRATROPIUM BROMIDE AND ALBUTEROL SULFATE 3 MILLILITER(S): .5; 2.5 SOLUTION RESPIRATORY (INHALATION) at 06:43

## 2024-01-01 RX ADMIN — Medication 18 UNIT(S): at 13:02

## 2024-01-01 RX ADMIN — ALBUMIN HUMAN 125 MILLILITER(S): 50 SOLUTION INTRAVENOUS at 20:05

## 2024-01-01 RX ADMIN — Medication 3 UNIT(S)/HR: at 20:18

## 2024-01-01 RX ADMIN — Medication 4 MILLILITER(S): at 11:15

## 2024-01-01 RX ADMIN — PREDNISONE 20 MILLIGRAM(S): 5 TABLET ORAL at 06:11

## 2024-01-01 RX ADMIN — MEROPENEM 100 MILLIGRAM(S): 500 INJECTION INTRAVENOUS at 05:03

## 2024-01-01 RX ADMIN — Medication 2: at 23:17

## 2024-01-01 RX ADMIN — Medication 255 MILLIMOLE(S): at 19:40

## 2024-01-01 RX ADMIN — ANTISEPTIC SURGICAL SCRUB 1 APPLICATION(S): 0.04 SOLUTION TOPICAL at 05:00

## 2024-01-01 RX ADMIN — AMPICILLIN SODIUM AND SULBACTAM SODIUM 200 GRAM(S): 2; 1 INJECTION, POWDER, FOR SOLUTION INTRAMUSCULAR; INTRAVENOUS at 23:57

## 2024-01-01 RX ADMIN — NYSTATIN 1 APPLICATION(S): 100000 POWDER TOPICAL at 18:07

## 2024-01-01 RX ADMIN — Medication 12.5 MILLIGRAM(S): at 00:54

## 2024-01-01 RX ADMIN — POTASSIUM CHLORIDE 100 MILLIEQUIVALENT(S): 750 TABLET, EXTENDED RELEASE ORAL at 18:34

## 2024-01-01 RX ADMIN — URSODIOL 300 MILLIGRAM(S): 250 TABLET, FILM COATED ORAL at 18:24

## 2024-01-01 RX ADMIN — SULFAMETHOXAZOLE AND TRIMETHOPRIM 1 TABLET(S): 400; 80 TABLET ORAL at 12:58

## 2024-01-01 RX ADMIN — ANTISEPTIC SURGICAL SCRUB 1 APPLICATION(S): 0.04 SOLUTION TOPICAL at 05:17

## 2024-01-01 RX ADMIN — Medication 200 GRAM(S): at 06:56

## 2024-01-01 RX ADMIN — HYDROMORPHONE HYDROCHLORIDE 0.5 MILLIGRAM(S): 4 INJECTION, SOLUTION INTRAMUSCULAR; INTRAVENOUS; SUBCUTANEOUS at 02:05

## 2024-01-01 RX ADMIN — Medication 2 TABLET(S): at 21:04

## 2024-01-01 RX ADMIN — METHYLNALTREXONE BROMIDE 12 MILLIGRAM(S): 12 INJECTION, SOLUTION SUBCUTANEOUS at 23:53

## 2024-01-01 RX ADMIN — POLYETHYLENE GLYCOL 3350 17 GRAM(S): 17 POWDER, FOR SOLUTION ORAL at 11:59

## 2024-01-01 RX ADMIN — LEVETIRACETAM 400 MILLIGRAM(S): 750 TABLET, FILM COATED ORAL at 17:34

## 2024-01-01 RX ADMIN — BUDESONIDE 0.5 MILLIGRAM(S): 9 TABLET, EXTENDED RELEASE ORAL at 05:23

## 2024-01-01 RX ADMIN — Medication 5000 UNIT(S): at 05:11

## 2024-01-01 RX ADMIN — Medication 4 MILLILITER(S): at 11:14

## 2024-01-01 RX ADMIN — Medication 40 MILLIGRAM(S): at 15:49

## 2024-01-01 RX ADMIN — ANTISEPTIC SURGICAL SCRUB 1 APPLICATION(S): 0.04 SOLUTION TOPICAL at 05:26

## 2024-01-01 RX ADMIN — Medication 100 MILLIGRAM(S): at 01:50

## 2024-01-01 RX ADMIN — Medication 14 UNIT(S): at 02:23

## 2024-01-01 RX ADMIN — SODIUM BICARBONATE 50 MILLIEQUIVALENT(S): 42 INJECTION, SOLUTION INTRAVENOUS at 11:30

## 2024-01-01 RX ADMIN — IPRATROPIUM BROMIDE AND ALBUTEROL SULFATE 3 MILLILITER(S): .5; 2.5 SOLUTION RESPIRATORY (INHALATION) at 05:04

## 2024-01-01 RX ADMIN — Medication 4: at 01:11

## 2024-01-01 RX ADMIN — Medication 32 MILLIGRAM(S): at 11:58

## 2024-01-01 RX ADMIN — SODIUM BICARBONATE 50 MILLIEQUIVALENT(S): 42 INJECTION, SOLUTION INTRAVENOUS at 05:42

## 2024-01-01 RX ADMIN — SODIUM BICARBONATE 1300 MILLIGRAM(S): 42 INJECTION, SOLUTION INTRAVENOUS at 17:02

## 2024-01-01 RX ADMIN — OXYCODONE HYDROCHLORIDE 5 MILLIGRAM(S): 30 TABLET ORAL at 06:03

## 2024-01-01 RX ADMIN — HYDROMORPHONE HYDROCHLORIDE 1 MILLIGRAM(S): 4 INJECTION, SOLUTION INTRAMUSCULAR; INTRAVENOUS; SUBCUTANEOUS at 02:26

## 2024-01-01 RX ADMIN — NYSTATIN 1 APPLICATION(S): 100000 POWDER TOPICAL at 14:29

## 2024-01-01 RX ADMIN — NYSTATIN 1 APPLICATION(S): 100000 POWDER TOPICAL at 06:12

## 2024-01-01 RX ADMIN — OXYCODONE HYDROCHLORIDE 5 MILLIGRAM(S): 30 TABLET ORAL at 21:45

## 2024-01-01 RX ADMIN — IPRATROPIUM BROMIDE AND ALBUTEROL SULFATE 3 MILLILITER(S): .5; 2.5 SOLUTION RESPIRATORY (INHALATION) at 05:02

## 2024-01-01 RX ADMIN — Medication 5000 UNIT(S): at 17:29

## 2024-01-01 RX ADMIN — Medication 80 MILLIGRAM(S): at 22:39

## 2024-01-01 RX ADMIN — MEROPENEM 100 MILLIGRAM(S): 500 INJECTION INTRAVENOUS at 22:24

## 2024-01-01 RX ADMIN — VANCOMYCIN HYDROCHLORIDE 250 MILLIGRAM(S): KIT at 03:39

## 2024-01-01 RX ADMIN — HYDROMORPHONE HYDROCHLORIDE 0.5 MILLIGRAM(S): 4 INJECTION, SOLUTION INTRAMUSCULAR; INTRAVENOUS; SUBCUTANEOUS at 11:38

## 2024-01-01 RX ADMIN — GANCICLOVIR 100 MILLIGRAM(S): 250 CAPSULE ORAL at 12:13

## 2024-01-01 RX ADMIN — PANTOPRAZOLE 10 MG/HR: 20 TABLET, DELAYED RELEASE ORAL at 10:51

## 2024-01-01 RX ADMIN — Medication 50 MILLIGRAM(S): at 21:20

## 2024-01-01 RX ADMIN — OXYCODONE HYDROCHLORIDE 5 MILLIGRAM(S): 30 TABLET ORAL at 00:04

## 2024-01-01 RX ADMIN — AMIODARONE HYDROCHLORIDE 16.7 MG/MIN: 50 INJECTION, SOLUTION INTRAVENOUS at 15:00

## 2024-01-01 RX ADMIN — URSODIOL 300 MILLIGRAM(S): 250 TABLET, FILM COATED ORAL at 06:12

## 2024-01-01 RX ADMIN — Medication 6 UNIT(S): at 12:04

## 2024-01-01 RX ADMIN — Medication 25 MILLIGRAM(S): at 05:07

## 2024-01-01 RX ADMIN — Medication 200 GRAM(S): at 21:18

## 2024-01-01 RX ADMIN — ALBUMIN HUMAN 1000 MILLILITER(S): 50 SOLUTION INTRAVENOUS at 01:51

## 2024-01-01 RX ADMIN — LEVETIRACETAM 400 MILLIGRAM(S): 750 TABLET, FILM COATED ORAL at 06:11

## 2024-01-01 RX ADMIN — HYDROMORPHONE HYDROCHLORIDE 0.5 MILLIGRAM(S): 4 INJECTION, SOLUTION INTRAMUSCULAR; INTRAVENOUS; SUBCUTANEOUS at 05:13

## 2024-01-01 RX ADMIN — BUDESONIDE 0.5 MILLIGRAM(S): 9 TABLET, EXTENDED RELEASE ORAL at 17:40

## 2024-01-01 RX ADMIN — AMIODARONE HYDROCHLORIDE 16.7 MG/MIN: 50 INJECTION, SOLUTION INTRAVENOUS at 14:24

## 2024-01-01 RX ADMIN — URSODIOL 300 MILLIGRAM(S): 250 TABLET, FILM COATED ORAL at 05:33

## 2024-01-01 RX ADMIN — Medication 200 GRAM(S): at 20:43

## 2024-01-01 RX ADMIN — Medication 4 MILLILITER(S): at 17:13

## 2024-01-01 RX ADMIN — VANCOMYCIN HYDROCHLORIDE 250 MILLIGRAM(S): KIT at 21:20

## 2024-01-01 RX ADMIN — Medication 255 MILLIMOLE(S): at 03:06

## 2024-01-01 RX ADMIN — Medication 100 MILLIGRAM(S): at 17:28

## 2024-01-01 RX ADMIN — Medication 20 MILLILITER(S): at 07:00

## 2024-01-01 RX ADMIN — Medication 4: at 14:59

## 2024-01-01 RX ADMIN — URSODIOL 300 MILLIGRAM(S): 250 TABLET, FILM COATED ORAL at 17:24

## 2024-01-01 RX ADMIN — PANTOPRAZOLE 40 MILLIGRAM(S): 20 TABLET, DELAYED RELEASE ORAL at 11:52

## 2024-01-01 RX ADMIN — FLUCONAZOLE 100 MILLIGRAM(S): 100 TABLET ORAL at 12:39

## 2024-01-01 RX ADMIN — MEROPENEM 100 MILLIGRAM(S): 500 INJECTION INTRAVENOUS at 17:09

## 2024-01-01 RX ADMIN — TACROLIMUS 0.5 MILLIGRAM(S): 1 CAPSULE, GELATIN COATED ORAL at 15:17

## 2024-01-01 RX ADMIN — AMPICILLIN SODIUM AND SULBACTAM SODIUM 200 GRAM(S): 2; 1 INJECTION, POWDER, FOR SOLUTION INTRAMUSCULAR; INTRAVENOUS at 17:12

## 2024-01-01 RX ADMIN — CYCLOSPORINE 50 MILLIGRAM(S): 100 CAPSULE, GELATIN COATED ORAL at 07:42

## 2024-01-01 RX ADMIN — VALGANCICLOVIR HYDROCHLORIDE 450 MILLIGRAM(S): 50 POWDER, FOR SOLUTION ORAL at 11:02

## 2024-01-01 RX ADMIN — MUPIROCIN 1 APPLICATION(S): 2 CREAM TOPICAL at 05:20

## 2024-01-01 RX ADMIN — INSULIN GLARGINE-YFGN 18 UNIT(S): 100 INJECTION, SOLUTION SUBCUTANEOUS at 22:16

## 2024-01-01 RX ADMIN — NOREPINEPHRINE BITARTRATE 1.76 MICROGRAM(S)/KG/MIN: 1 INJECTION, SOLUTION, CONCENTRATE INTRAVENOUS at 15:00

## 2024-01-01 RX ADMIN — SODIUM CHLORIDE 50 MILLILITER(S): 9 INJECTION, SOLUTION INTRAVENOUS at 10:46

## 2024-01-01 RX ADMIN — IPRATROPIUM BROMIDE AND ALBUTEROL SULFATE 3 MILLILITER(S): .5; 2.5 SOLUTION RESPIRATORY (INHALATION) at 11:14

## 2024-01-01 RX ADMIN — ASPIRIN 81 MILLIGRAM(S): 81 TABLET, COATED ORAL at 11:32

## 2024-01-01 RX ADMIN — APIXABAN 5 MILLIGRAM(S): 5 TABLET, FILM COATED ORAL at 05:17

## 2024-01-01 RX ADMIN — VANCOMYCIN HYDROCHLORIDE 250 MILLIGRAM(S): KIT at 19:54

## 2024-01-01 RX ADMIN — HYDROMORPHONE HYDROCHLORIDE 0.5 MILLIGRAM(S): 4 INJECTION, SOLUTION INTRAMUSCULAR; INTRAVENOUS; SUBCUTANEOUS at 12:42

## 2024-01-01 RX ADMIN — ONDANSETRON 4 MILLIGRAM(S): 4 TABLET, ORALLY DISINTEGRATING ORAL at 23:49

## 2024-01-01 RX ADMIN — ANTISEPTIC SURGICAL SCRUB 15 MILLILITER(S): 0.04 SOLUTION TOPICAL at 05:26

## 2024-01-01 RX ADMIN — PHENYLEPHRINE HYDROCHLORIDE 7.02 MICROGRAM(S)/KG/MIN: 10 INJECTION INTRAVENOUS at 18:11

## 2024-01-01 RX ADMIN — Medication 12.5 MILLIGRAM(S): at 21:02

## 2024-01-01 RX ADMIN — ACETAMINOPHEN 200 MILLIGRAM(S): 160 SUSPENSION ORAL at 09:18

## 2024-01-01 RX ADMIN — HYDROMORPHONE HYDROCHLORIDE 0.25 MILLIGRAM(S): 4 INJECTION, SOLUTION INTRAMUSCULAR; INTRAVENOUS; SUBCUTANEOUS at 19:30

## 2024-01-01 RX ADMIN — ANTISEPTIC SURGICAL SCRUB 15 MILLILITER(S): 0.04 SOLUTION TOPICAL at 05:20

## 2024-01-01 RX ADMIN — MIDODRINE HYDROCHLORIDE 5 MILLIGRAM(S): 5 TABLET ORAL at 13:01

## 2024-01-01 RX ADMIN — LINEZOLID 300 MILLIGRAM(S): 600 INJECTION, SOLUTION INTRAVENOUS at 18:52

## 2024-01-01 RX ADMIN — Medication 255 MILLIMOLE(S): at 07:47

## 2024-01-01 RX ADMIN — POTASSIUM CHLORIDE 100 MILLIEQUIVALENT(S): 750 TABLET, EXTENDED RELEASE ORAL at 10:20

## 2024-01-01 RX ADMIN — Medication 50 MILLIGRAM(S): at 06:35

## 2024-01-01 RX ADMIN — Medication 14 MICROGRAM(S)/KG/MIN: at 20:46

## 2024-01-01 RX ADMIN — Medication 5000 UNIT(S): at 05:28

## 2024-01-01 RX ADMIN — DEXMEDETOMIDINE HYDROCHLORIDE 11.7 MICROGRAM(S)/KG/HR: 4 INJECTION, SOLUTION INTRAVENOUS at 19:44

## 2024-01-01 RX ADMIN — Medication 0.5 MILLIGRAM(S): at 22:05

## 2024-01-01 RX ADMIN — Medication 4.5 UNIT(S)/MIN: at 07:41

## 2024-01-01 RX ADMIN — Medication 4.5 UNIT(S)/MIN: at 07:42

## 2024-01-01 RX ADMIN — VANCOMYCIN HYDROCHLORIDE 250 MILLIGRAM(S): KIT at 15:09

## 2024-01-01 RX ADMIN — Medication 6: at 18:29

## 2024-01-01 RX ADMIN — NYSTATIN 1 APPLICATION(S): 100000 POWDER TOPICAL at 18:44

## 2024-01-01 RX ADMIN — LINEZOLID 300 MILLIGRAM(S): 600 INJECTION, SOLUTION INTRAVENOUS at 18:12

## 2024-01-01 RX ADMIN — Medication 2 TABLET(S): at 21:24

## 2024-01-01 RX ADMIN — ANTISEPTIC SURGICAL SCRUB 15 MILLILITER(S): 0.04 SOLUTION TOPICAL at 05:58

## 2024-01-01 RX ADMIN — URSODIOL 300 MILLIGRAM(S): 250 TABLET, FILM COATED ORAL at 20:40

## 2024-01-01 RX ADMIN — MIDODRINE HYDROCHLORIDE 5 MILLIGRAM(S): 5 TABLET ORAL at 21:54

## 2024-01-01 RX ADMIN — Medication 4 MILLILITER(S): at 23:18

## 2024-01-01 RX ADMIN — Medication 100 MILLIGRAM(S): at 05:56

## 2024-01-01 RX ADMIN — NYSTATIN 1 APPLICATION(S): 100000 POWDER TOPICAL at 06:37

## 2024-01-01 RX ADMIN — Medication 1 APPLICATION(S): at 12:18

## 2024-01-01 RX ADMIN — OXYCODONE HYDROCHLORIDE 5 MILLIGRAM(S): 30 TABLET ORAL at 20:00

## 2024-01-01 RX ADMIN — IPRATROPIUM BROMIDE AND ALBUTEROL SULFATE 3 MILLILITER(S): .5; 2.5 SOLUTION RESPIRATORY (INHALATION) at 11:22

## 2024-01-01 RX ADMIN — HYDROMORPHONE HYDROCHLORIDE 1 MILLIGRAM(S): 4 INJECTION, SOLUTION INTRAMUSCULAR; INTRAVENOUS; SUBCUTANEOUS at 01:56

## 2024-01-01 RX ADMIN — PANTOPRAZOLE 40 MILLIGRAM(S): 20 TABLET, DELAYED RELEASE ORAL at 06:06

## 2024-01-01 RX ADMIN — Medication 12 UNIT(S): at 05:11

## 2024-01-01 RX ADMIN — CYCLOSPORINE 75 MILLIGRAM(S): 100 CAPSULE, GELATIN COATED ORAL at 09:38

## 2024-01-01 RX ADMIN — POLYETHYLENE GLYCOL 3350 17 GRAM(S): 17 POWDER, FOR SOLUTION ORAL at 05:05

## 2024-01-01 RX ADMIN — DILTIAZEM HYDROCHLORIDE 10 MG/HR: 60 TABLET ORAL at 10:17

## 2024-01-01 RX ADMIN — Medication 12.5 MILLIGRAM(S): at 17:53

## 2024-01-01 RX ADMIN — MYCOPHENOLATE MOFETIL 1000 MILLIGRAM(S): 200 POWDER, FOR SUSPENSION ORAL at 19:40

## 2024-01-01 RX ADMIN — ARGATROBAN 1.19 MICROGRAM(S)/KG/MIN: 50 INJECTION, SOLUTION INTRAVENOUS at 07:39

## 2024-01-01 RX ADMIN — HYDROMORPHONE HYDROCHLORIDE 0.5 MILLIGRAM(S): 4 INJECTION, SOLUTION INTRAMUSCULAR; INTRAVENOUS; SUBCUTANEOUS at 11:10

## 2024-01-01 RX ADMIN — SODIUM CHLORIDE 30 MILLILITER(S): 9 INJECTION, SOLUTION INTRAVENOUS at 16:02

## 2024-01-01 RX ADMIN — ACETAMINOPHEN 650 MILLIGRAM(S): 160 SUSPENSION ORAL at 11:15

## 2024-01-01 RX ADMIN — PREDNISONE 20 MILLIGRAM(S): 5 TABLET ORAL at 05:16

## 2024-01-01 RX ADMIN — MEROPENEM 100 MILLIGRAM(S): 500 INJECTION INTRAVENOUS at 21:12

## 2024-01-01 RX ADMIN — ASPIRIN 81 MILLIGRAM(S): 81 TABLET, COATED ORAL at 11:16

## 2024-01-01 RX ADMIN — OXYCODONE HYDROCHLORIDE 10 MILLIGRAM(S): 30 TABLET ORAL at 08:03

## 2024-01-01 RX ADMIN — NYSTATIN 1 APPLICATION(S): 100000 POWDER TOPICAL at 17:22

## 2024-01-01 RX ADMIN — Medication 6: at 05:44

## 2024-01-01 RX ADMIN — OXYCODONE HYDROCHLORIDE 5 MILLIGRAM(S): 30 TABLET ORAL at 06:33

## 2024-01-01 RX ADMIN — CYCLOSPORINE 75 MILLIGRAM(S): 100 CAPSULE, GELATIN COATED ORAL at 07:57

## 2024-01-01 RX ADMIN — Medication 1.5 UNIT(S)/MIN: at 19:40

## 2024-01-01 RX ADMIN — Medication 12.5 MILLIGRAM(S): at 13:41

## 2024-01-01 RX ADMIN — Medication 2 UNIT(S)/HR: at 14:29

## 2024-01-01 RX ADMIN — Medication 4.5 UNIT(S)/MIN: at 19:42

## 2024-01-01 RX ADMIN — HYDROMORPHONE HYDROCHLORIDE 0.5 MILLIGRAM(S): 4 INJECTION, SOLUTION INTRAMUSCULAR; INTRAVENOUS; SUBCUTANEOUS at 23:33

## 2024-01-01 RX ADMIN — VALGANCICLOVIR HYDROCHLORIDE 450 MILLIGRAM(S): 50 POWDER, FOR SOLUTION ORAL at 17:14

## 2024-01-01 RX ADMIN — ANTISEPTIC SURGICAL SCRUB 15 MILLILITER(S): 0.04 SOLUTION TOPICAL at 18:26

## 2024-01-01 RX ADMIN — Medication 5000 UNIT(S): at 05:57

## 2024-01-01 RX ADMIN — HYDROMORPHONE HYDROCHLORIDE 1 MILLIGRAM(S): 4 INJECTION, SOLUTION INTRAMUSCULAR; INTRAVENOUS; SUBCUTANEOUS at 13:55

## 2024-01-01 RX ADMIN — OXYCODONE HYDROCHLORIDE 10 MILLIGRAM(S): 30 TABLET ORAL at 21:26

## 2024-01-01 RX ADMIN — NYSTATIN 1 APPLICATION(S): 100000 POWDER TOPICAL at 17:25

## 2024-01-01 RX ADMIN — ANTISEPTIC SURGICAL SCRUB 15 MILLILITER(S): 0.04 SOLUTION TOPICAL at 18:43

## 2024-01-01 RX ADMIN — CASPOFUNGIN ACETATE 250 MILLIGRAM(S): 5 INJECTION, POWDER, LYOPHILIZED, FOR SOLUTION INTRAVENOUS at 12:12

## 2024-01-01 RX ADMIN — Medication 200 GRAM(S): at 08:36

## 2024-01-01 RX ADMIN — LEVETIRACETAM 250 MILLIGRAM(S): 750 TABLET, FILM COATED ORAL at 18:26

## 2024-01-01 RX ADMIN — NYSTATIN 1 APPLICATION(S): 100000 POWDER TOPICAL at 18:03

## 2024-01-01 RX ADMIN — Medication 200 GRAM(S): at 17:55

## 2024-01-01 RX ADMIN — Medication 15 UNIT(S): at 05:25

## 2024-01-01 RX ADMIN — BUDESONIDE 0.5 MILLIGRAM(S): 9 TABLET, EXTENDED RELEASE ORAL at 18:15

## 2024-01-01 RX ADMIN — NYSTATIN 1 APPLICATION(S): 100000 POWDER TOPICAL at 05:56

## 2024-01-01 RX ADMIN — Medication 1 MILLIGRAM(S): at 14:11

## 2024-01-01 RX ADMIN — PHENYLEPHRINE HYDROCHLORIDE 91.3 MICROGRAM(S)/KG/MIN: 10 INJECTION INTRAVENOUS at 00:50

## 2024-01-01 RX ADMIN — MYCOPHENOLATE MOFETIL 1000 MILLIGRAM(S): 200 POWDER, FOR SUSPENSION ORAL at 07:50

## 2024-01-01 RX ADMIN — ASPIRIN 81 MILLIGRAM(S): 81 TABLET, COATED ORAL at 11:05

## 2024-01-01 RX ADMIN — Medication 4: at 18:25

## 2024-01-01 RX ADMIN — HYDROMORPHONE HYDROCHLORIDE 1 MILLIGRAM(S): 4 INJECTION, SOLUTION INTRAMUSCULAR; INTRAVENOUS; SUBCUTANEOUS at 18:19

## 2024-01-01 RX ADMIN — Medication 40 MILLIGRAM(S): at 09:15

## 2024-01-01 RX ADMIN — Medication 1 TABLET(S): at 17:13

## 2024-01-01 RX ADMIN — AMPICILLIN SODIUM AND SULBACTAM SODIUM 200 GRAM(S): 2; 1 INJECTION, POWDER, FOR SOLUTION INTRAMUSCULAR; INTRAVENOUS at 05:14

## 2024-01-01 RX ADMIN — ACETAMINOPHEN 200 MILLIGRAM(S): 160 SUSPENSION ORAL at 00:09

## 2024-01-01 RX ADMIN — Medication 4 MILLILITER(S): at 01:49

## 2024-01-01 RX ADMIN — Medication 20 MILLIGRAM(S): at 05:33

## 2024-01-01 RX ADMIN — NYSTATIN 1 APPLICATION(S): 100000 POWDER TOPICAL at 06:56

## 2024-01-01 RX ADMIN — NOREPINEPHRINE BITARTRATE 8.78 MICROGRAM(S)/KG/MIN: 1 INJECTION, SOLUTION, CONCENTRATE INTRAVENOUS at 01:01

## 2024-01-01 RX ADMIN — Medication 5000 UNIT(S): at 17:54

## 2024-01-01 RX ADMIN — PREDNISONE 20 MILLIGRAM(S): 5 TABLET ORAL at 05:02

## 2024-01-01 RX ADMIN — Medication 12.5 MILLIGRAM(S): at 05:59

## 2024-01-01 RX ADMIN — Medication 125 MICROGRAM(S): at 11:40

## 2024-01-01 RX ADMIN — Medication 5 MILLIGRAM(S): at 01:10

## 2024-01-01 RX ADMIN — POLYETHYLENE GLYCOL 3350 17 GRAM(S): 17 POWDER, FOR SOLUTION ORAL at 13:13

## 2024-01-01 RX ADMIN — Medication 16 MILLIGRAM(S): at 11:50

## 2024-01-01 RX ADMIN — Medication 14 UNIT(S): at 20:21

## 2024-01-01 RX ADMIN — OXYCODONE HYDROCHLORIDE 5 MILLIGRAM(S): 30 TABLET ORAL at 23:30

## 2024-01-01 RX ADMIN — MYCOPHENOLATE MOFETIL 1000 MILLIGRAM(S): 200 POWDER, FOR SUSPENSION ORAL at 19:54

## 2024-01-01 RX ADMIN — ALBUMIN HUMAN 250 MILLILITER(S): 50 SOLUTION INTRAVENOUS at 22:47

## 2024-01-01 RX ADMIN — CYCLOSPORINE 25 MILLIGRAM(S): 100 CAPSULE, GELATIN COATED ORAL at 19:23

## 2024-01-01 RX ADMIN — Medication 4 MILLILITER(S): at 23:16

## 2024-01-01 RX ADMIN — Medication 28.1 MICROGRAM(S)/KG/MIN: at 18:38

## 2024-01-01 RX ADMIN — POLYETHYLENE GLYCOL 3350 17 GRAM(S): 17 POWDER, FOR SOLUTION ORAL at 13:17

## 2024-01-01 RX ADMIN — Medication 6: at 13:15

## 2024-01-01 RX ADMIN — Medication 100 MILLIGRAM(S): at 11:20

## 2024-01-01 RX ADMIN — Medication 1 TABLET(S): at 06:13

## 2024-01-01 RX ADMIN — Medication 5000 UNIT(S): at 17:50

## 2024-01-01 RX ADMIN — HYDROMORPHONE HYDROCHLORIDE 0.5 MILLIGRAM(S): 4 INJECTION, SOLUTION INTRAMUSCULAR; INTRAVENOUS; SUBCUTANEOUS at 06:15

## 2024-01-01 RX ADMIN — ANTISEPTIC SURGICAL SCRUB 1 APPLICATION(S): 0.04 SOLUTION TOPICAL at 05:44

## 2024-01-01 RX ADMIN — Medication 2: at 12:57

## 2024-01-01 RX ADMIN — FLUCONAZOLE 400 MILLIGRAM(S): 100 TABLET ORAL at 11:56

## 2024-01-01 RX ADMIN — FENTANYL CITRATE 1 PATCH: 50 INJECTION INTRAMUSCULAR; INTRAVENOUS at 17:14

## 2024-01-01 RX ADMIN — Medication 14 UNIT(S)/HR: at 22:45

## 2024-01-01 RX ADMIN — HYDROMORPHONE HYDROCHLORIDE 0.5 MILLIGRAM(S): 4 INJECTION, SOLUTION INTRAMUSCULAR; INTRAVENOUS; SUBCUTANEOUS at 11:50

## 2024-01-01 RX ADMIN — VALGANCICLOVIR HYDROCHLORIDE 900 MILLIGRAM(S): 50 POWDER, FOR SOLUTION ORAL at 11:05

## 2024-01-01 RX ADMIN — Medication 4 MILLILITER(S): at 23:02

## 2024-01-01 RX ADMIN — MUPIROCIN 1 APPLICATION(S): 2 CREAM TOPICAL at 06:29

## 2024-01-01 RX ADMIN — MYCOPHENOLATE MOFETIL 1000 MILLIGRAM(S): 200 POWDER, FOR SUSPENSION ORAL at 08:12

## 2024-01-01 RX ADMIN — Medication 4 MILLILITER(S): at 17:40

## 2024-01-01 RX ADMIN — Medication 5 MILLIGRAM(S): at 20:53

## 2024-01-01 RX ADMIN — SULFAMETHOXAZOLE AND TRIMETHOPRIM 210 MILLIGRAM(S): 400; 80 TABLET ORAL at 12:49

## 2024-01-01 RX ADMIN — ESCITALOPRAM 20 MILLIGRAM(S): 10 TABLET, FILM COATED ORAL at 22:59

## 2024-01-01 RX ADMIN — OXYCODONE HYDROCHLORIDE 10 MILLIGRAM(S): 30 TABLET ORAL at 10:17

## 2024-01-01 RX ADMIN — Medication 125 MICROGRAM(S): at 11:09

## 2024-01-01 RX ADMIN — PANTOPRAZOLE 40 MILLIGRAM(S): 20 TABLET, DELAYED RELEASE ORAL at 11:04

## 2024-01-01 RX ADMIN — FILGRASTIM-SNDZ 480 MICROGRAM(S): 300 INJECTION, SOLUTION INTRAVENOUS; SUBCUTANEOUS at 16:46

## 2024-01-01 RX ADMIN — Medication 10 UNIT(S): at 17:14

## 2024-01-01 RX ADMIN — OXYCODONE HYDROCHLORIDE 10 MILLIGRAM(S): 30 TABLET ORAL at 17:26

## 2024-01-01 RX ADMIN — PANTOPRAZOLE 40 MILLIGRAM(S): 20 TABLET, DELAYED RELEASE ORAL at 09:28

## 2024-01-01 RX ADMIN — BUDESONIDE 0.5 MILLIGRAM(S): 9 TABLET, EXTENDED RELEASE ORAL at 05:00

## 2024-01-01 RX ADMIN — PHENYLEPHRINE HYDROCHLORIDE 28 MICROGRAM(S)/KG/MIN: 10 INJECTION INTRAVENOUS at 07:50

## 2024-01-01 RX ADMIN — POTASSIUM CHLORIDE 100 MILLIEQUIVALENT(S): 750 TABLET, EXTENDED RELEASE ORAL at 23:51

## 2024-01-01 RX ADMIN — MEROPENEM 100 MILLIGRAM(S): 500 INJECTION INTRAVENOUS at 05:14

## 2024-01-01 RX ADMIN — PHENYLEPHRINE HYDROCHLORIDE 7.02 MICROGRAM(S)/KG/MIN: 10 INJECTION INTRAVENOUS at 07:38

## 2024-01-01 RX ADMIN — BUDESONIDE 0.5 MILLIGRAM(S): 9 TABLET, EXTENDED RELEASE ORAL at 05:43

## 2024-01-01 RX ADMIN — LINEZOLID 300 MILLIGRAM(S): 600 INJECTION, SOLUTION INTRAVENOUS at 12:58

## 2024-01-01 RX ADMIN — Medication 6 UNIT(S): at 11:59

## 2024-01-01 RX ADMIN — FLUCONAZOLE 100 MILLIGRAM(S): 100 TABLET ORAL at 11:02

## 2024-01-01 RX ADMIN — Medication 2: at 11:58

## 2024-01-01 RX ADMIN — SULFAMETHOXAZOLE AND TRIMETHOPRIM 1 TABLET(S): 400; 80 TABLET ORAL at 11:33

## 2024-01-01 RX ADMIN — Medication 200 GRAM(S): at 02:40

## 2024-01-01 RX ADMIN — CYCLOSPORINE 50 MILLIGRAM(S): 100 CAPSULE, GELATIN COATED ORAL at 07:51

## 2024-01-01 RX ADMIN — ASPIRIN 81 MILLIGRAM(S): 81 TABLET, COATED ORAL at 11:31

## 2024-01-01 RX ADMIN — POTASSIUM CHLORIDE 40 MILLIEQUIVALENT(S): 750 TABLET, EXTENDED RELEASE ORAL at 06:36

## 2024-01-01 RX ADMIN — MIDODRINE HYDROCHLORIDE 10 MILLIGRAM(S): 5 TABLET ORAL at 05:58

## 2024-01-01 RX ADMIN — AMPICILLIN SODIUM AND SULBACTAM SODIUM 200 GRAM(S): 2; 1 INJECTION, POWDER, FOR SOLUTION INTRAMUSCULAR; INTRAVENOUS at 17:15

## 2024-01-01 RX ADMIN — Medication 80 MILLIGRAM(S): at 18:32

## 2024-01-01 RX ADMIN — URSODIOL 300 MILLIGRAM(S): 250 TABLET, FILM COATED ORAL at 17:13

## 2024-01-01 RX ADMIN — Medication 4 MILLILITER(S): at 17:36

## 2024-01-01 RX ADMIN — ANTISEPTIC SURGICAL SCRUB 15 MILLILITER(S): 0.04 SOLUTION TOPICAL at 17:54

## 2024-01-01 RX ADMIN — ALBUMIN HUMAN 50 MILLILITER(S): 50 SOLUTION INTRAVENOUS at 00:30

## 2024-01-01 RX ADMIN — DEXMEDETOMIDINE HYDROCHLORIDE 0.7 MICROGRAM(S)/KG/HR: 4 INJECTION, SOLUTION INTRAVENOUS at 12:35

## 2024-01-01 RX ADMIN — HYDROMORPHONE HYDROCHLORIDE 0.25 MILLIGRAM(S): 4 INJECTION, SOLUTION INTRAMUSCULAR; INTRAVENOUS; SUBCUTANEOUS at 20:40

## 2024-01-01 RX ADMIN — Medication 100 MILLIGRAM(S): at 02:53

## 2024-01-01 RX ADMIN — Medication 1 DROP(S): at 05:27

## 2024-01-01 RX ADMIN — Medication 50 MILLIGRAM(S): at 18:45

## 2024-01-01 RX ADMIN — Medication 2: at 17:58

## 2024-01-01 RX ADMIN — Medication 25 MILLIGRAM(S): at 21:16

## 2024-01-01 RX ADMIN — LINEZOLID 300 MILLIGRAM(S): 600 INJECTION, SOLUTION INTRAVENOUS at 23:17

## 2024-01-01 RX ADMIN — Medication 200 GRAM(S): at 02:59

## 2024-01-01 RX ADMIN — HYDROMORPHONE HYDROCHLORIDE 1 MILLIGRAM(S): 4 INJECTION, SOLUTION INTRAMUSCULAR; INTRAVENOUS; SUBCUTANEOUS at 04:41

## 2024-01-01 RX ADMIN — MUPIROCIN 1 APPLICATION(S): 2 CREAM TOPICAL at 05:04

## 2024-01-01 RX ADMIN — Medication 1 MILLIGRAM(S): at 14:13

## 2024-01-01 RX ADMIN — BUDESONIDE 0.5 MILLIGRAM(S): 9 TABLET, EXTENDED RELEASE ORAL at 17:37

## 2024-01-01 RX ADMIN — SODIUM PHOSPHATE, DIBASIC, ANHYDROUS, POTASSIUM PHOSPHATE, MONOBASIC, AND SODIUM PHOSPHATE, MONOBASIC, MONOHYDRATE 2 PACKET(S): 852; 155; 130 TABLET, COATED ORAL at 08:21

## 2024-01-01 RX ADMIN — HYDROMORPHONE HYDROCHLORIDE 1 MILLIGRAM(S): 4 INJECTION, SOLUTION INTRAMUSCULAR; INTRAVENOUS; SUBCUTANEOUS at 09:45

## 2024-01-01 RX ADMIN — Medication 100 MILLIGRAM(S): at 06:53

## 2024-01-01 RX ADMIN — HYDROMORPHONE HYDROCHLORIDE 0.5 MILLIGRAM(S): 4 INJECTION, SOLUTION INTRAMUSCULAR; INTRAVENOUS; SUBCUTANEOUS at 23:30

## 2024-01-01 RX ADMIN — IPRATROPIUM BROMIDE AND ALBUTEROL SULFATE 3 MILLILITER(S): .5; 2.5 SOLUTION RESPIRATORY (INHALATION) at 05:35

## 2024-01-01 RX ADMIN — OXYCODONE HYDROCHLORIDE 10 MILLIGRAM(S): 30 TABLET ORAL at 01:30

## 2024-01-01 RX ADMIN — PREDNISONE 20 MILLIGRAM(S): 5 TABLET ORAL at 05:15

## 2024-01-01 RX ADMIN — Medication 4 MILLILITER(S): at 05:33

## 2024-01-01 RX ADMIN — Medication 20 MILLILITER(S): at 07:43

## 2024-01-01 RX ADMIN — PREDNISONE 20 MILLIGRAM(S): 5 TABLET ORAL at 05:21

## 2024-01-01 RX ADMIN — Medication 2: at 05:42

## 2024-01-01 RX ADMIN — IPRATROPIUM BROMIDE AND ALBUTEROL SULFATE 3 MILLILITER(S): .5; 2.5 SOLUTION RESPIRATORY (INHALATION) at 11:11

## 2024-01-01 RX ADMIN — Medication 25 GRAM(S): at 20:40

## 2024-01-01 RX ADMIN — POTASSIUM CHLORIDE 40 MILLIEQUIVALENT(S): 750 TABLET, EXTENDED RELEASE ORAL at 12:05

## 2024-01-01 RX ADMIN — AMIKACIN SULFATE 102 MILLIGRAM(S): 250 INJECTION INTRAMUSCULAR; INTRAVENOUS at 02:34

## 2024-01-01 RX ADMIN — IPRATROPIUM BROMIDE AND ALBUTEROL SULFATE 3 MILLILITER(S): .5; 2.5 SOLUTION RESPIRATORY (INHALATION) at 23:47

## 2024-01-01 RX ADMIN — SODIUM PHOSPHATE, DIBASIC, ANHYDROUS, POTASSIUM PHOSPHATE, MONOBASIC, AND SODIUM PHOSPHATE, MONOBASIC, MONOHYDRATE 2 TABLET(S): 852; 155; 130 TABLET, COATED ORAL at 21:28

## 2024-01-01 RX ADMIN — HYDROMORPHONE HYDROCHLORIDE 0.5 MILLIGRAM(S): 4 INJECTION, SOLUTION INTRAMUSCULAR; INTRAVENOUS; SUBCUTANEOUS at 08:51

## 2024-01-01 RX ADMIN — NYSTATIN 1 APPLICATION(S): 100000 POWDER TOPICAL at 06:29

## 2024-01-01 RX ADMIN — Medication 40 MILLIGRAM(S): at 21:05

## 2024-01-01 RX ADMIN — Medication 2: at 00:43

## 2024-01-01 RX ADMIN — IPRATROPIUM BROMIDE AND ALBUTEROL SULFATE 3 MILLILITER(S): .5; 2.5 SOLUTION RESPIRATORY (INHALATION) at 17:26

## 2024-01-01 RX ADMIN — LINEZOLID 300 MILLIGRAM(S): 600 INJECTION, SOLUTION INTRAVENOUS at 13:59

## 2024-01-01 RX ADMIN — Medication 25 MILLIGRAM(S): at 05:12

## 2024-01-01 RX ADMIN — Medication 25 GRAM(S): at 23:20

## 2024-01-01 RX ADMIN — OXYCODONE HYDROCHLORIDE 2.5 MILLIGRAM(S): 30 TABLET ORAL at 01:33

## 2024-01-01 RX ADMIN — ACETAMINOPHEN 200 MILLIGRAM(S): 160 SUSPENSION ORAL at 15:11

## 2024-01-01 RX ADMIN — FENTANYL CITRATE 50 MICROGRAM(S): 50 INJECTION INTRAMUSCULAR; INTRAVENOUS at 13:45

## 2024-01-01 RX ADMIN — Medication 4: at 01:30

## 2024-01-01 RX ADMIN — Medication 8.42 MICROGRAM(S)/KG/MIN: at 19:49

## 2024-01-01 RX ADMIN — PANTOPRAZOLE 10 MG/HR: 20 TABLET, DELAYED RELEASE ORAL at 19:54

## 2024-01-01 RX ADMIN — IPRATROPIUM BROMIDE AND ALBUTEROL SULFATE 3 MILLILITER(S): .5; 2.5 SOLUTION RESPIRATORY (INHALATION) at 23:30

## 2024-01-01 RX ADMIN — CASPOFUNGIN ACETATE 250 MILLIGRAM(S): 5 INJECTION, POWDER, LYOPHILIZED, FOR SOLUTION INTRAVENOUS at 14:46

## 2024-01-01 RX ADMIN — HYDROMORPHONE HYDROCHLORIDE 0.5 MILLIGRAM(S): 4 INJECTION, SOLUTION INTRAMUSCULAR; INTRAVENOUS; SUBCUTANEOUS at 05:28

## 2024-01-01 RX ADMIN — ANTISEPTIC SURGICAL SCRUB 15 MILLILITER(S): 0.04 SOLUTION TOPICAL at 17:38

## 2024-01-01 RX ADMIN — Medication 1 APPLICATION(S): at 06:31

## 2024-01-01 RX ADMIN — Medication 100 MILLIGRAM(S): at 09:10

## 2024-01-01 RX ADMIN — Medication 12.5 MILLIGRAM(S): at 22:55

## 2024-01-01 RX ADMIN — AMIODARONE HYDROCHLORIDE 600 MILLIGRAM(S): 50 INJECTION, SOLUTION INTRAVENOUS at 09:30

## 2024-01-01 RX ADMIN — Medication 200 GRAM(S): at 00:41

## 2024-01-01 RX ADMIN — PANTOPRAZOLE 40 MILLIGRAM(S): 20 TABLET, DELAYED RELEASE ORAL at 00:15

## 2024-01-01 RX ADMIN — PHYTONADIONE 101 MILLIGRAM(S): 2 INJECTION, EMULSION INTRAMUSCULAR; INTRAVENOUS; SUBCUTANEOUS at 13:22

## 2024-01-01 RX ADMIN — ANTISEPTIC SURGICAL SCRUB 1 APPLICATION(S): 0.04 SOLUTION TOPICAL at 05:24

## 2024-01-01 RX ADMIN — HYDROMORPHONE HYDROCHLORIDE 0.5 MILLIGRAM(S): 4 INJECTION, SOLUTION INTRAMUSCULAR; INTRAVENOUS; SUBCUTANEOUS at 08:21

## 2024-01-01 RX ADMIN — MEROPENEM 100 MILLIGRAM(S): 500 INJECTION INTRAVENOUS at 08:45

## 2024-01-01 RX ADMIN — PIPERACILLIN SODIUM AND TAZOBACTAM SODIUM 25 GRAM(S): 2; 250 INJECTION, POWDER, FOR SOLUTION INTRAVENOUS at 14:15

## 2024-01-01 RX ADMIN — Medication 5000 UNIT(S): at 05:13

## 2024-01-01 RX ADMIN — PANTOPRAZOLE 10 MG/HR: 20 TABLET, DELAYED RELEASE ORAL at 04:27

## 2024-01-01 RX ADMIN — Medication 6 UNIT(S): at 11:30

## 2024-01-01 RX ADMIN — HYDROMORPHONE HYDROCHLORIDE 1 MILLIGRAM(S): 4 INJECTION, SOLUTION INTRAMUSCULAR; INTRAVENOUS; SUBCUTANEOUS at 17:11

## 2024-01-01 RX ADMIN — MEROPENEM 100 MILLIGRAM(S): 500 INJECTION INTRAVENOUS at 13:40

## 2024-01-01 RX ADMIN — CASPOFUNGIN ACETATE 250 MILLIGRAM(S): 5 INJECTION, POWDER, LYOPHILIZED, FOR SOLUTION INTRAVENOUS at 15:43

## 2024-01-01 RX ADMIN — Medication 0.5 MILLIGRAM(S): at 13:24

## 2024-01-01 RX ADMIN — Medication 5000 UNIT(S): at 18:43

## 2024-01-01 RX ADMIN — Medication 1 TABLET(S): at 05:23

## 2024-01-01 RX ADMIN — SODIUM BICARBONATE 50 MILLIEQUIVALENT(S): 42 INJECTION, SOLUTION INTRAVENOUS at 13:31

## 2024-01-01 RX ADMIN — Medication 0.5 MILLIGRAM(S): at 11:38

## 2024-01-01 RX ADMIN — ARGATROBAN 1.19 MICROGRAM(S)/KG/MIN: 50 INJECTION, SOLUTION INTRAVENOUS at 01:17

## 2024-01-01 RX ADMIN — DEXMEDETOMIDINE HYDROCHLORIDE 4.68 MICROGRAM(S)/KG/HR: 4 INJECTION, SOLUTION INTRAVENOUS at 12:57

## 2024-01-01 RX ADMIN — Medication 5 MILLIGRAM(S): at 15:22

## 2024-01-01 RX ADMIN — NYSTATIN 1 APPLICATION(S): 100000 POWDER TOPICAL at 05:15

## 2024-01-01 RX ADMIN — NOREPINEPHRINE BITARTRATE 9.33 MICROGRAM(S)/KG/MIN: 1 INJECTION, SOLUTION, CONCENTRATE INTRAVENOUS at 19:58

## 2024-01-01 RX ADMIN — NOREPINEPHRINE BITARTRATE 9.33 MICROGRAM(S)/KG/MIN: 1 INJECTION, SOLUTION, CONCENTRATE INTRAVENOUS at 14:10

## 2024-01-01 RX ADMIN — POTASSIUM CHLORIDE 100 MILLIEQUIVALENT(S): 750 TABLET, EXTENDED RELEASE ORAL at 05:15

## 2024-01-01 RX ADMIN — Medication 12.5 MILLIGRAM(S): at 21:21

## 2024-01-01 RX ADMIN — CASPOFUNGIN ACETATE 260 MILLIGRAM(S): 5 INJECTION, POWDER, LYOPHILIZED, FOR SOLUTION INTRAVENOUS at 16:39

## 2024-01-01 RX ADMIN — PHENYLEPHRINE HYDROCHLORIDE 3.73 MICROGRAM(S)/KG/MIN: 10 INJECTION INTRAVENOUS at 19:34

## 2024-01-01 RX ADMIN — HYDROMORPHONE HYDROCHLORIDE 0.5 MILLIGRAM(S): 4 INJECTION, SOLUTION INTRAMUSCULAR; INTRAVENOUS; SUBCUTANEOUS at 18:06

## 2024-01-03 ENCOUNTER — RESULT REVIEW (OUTPATIENT)
Age: 74
End: 2024-01-03

## 2024-01-09 ENCOUNTER — APPOINTMENT (OUTPATIENT)
Dept: INTERVENTIONAL RADIOLOGY/VASCULAR | Facility: HOSPITAL | Age: 74
End: 2024-01-09

## 2024-02-15 ENCOUNTER — APPOINTMENT (OUTPATIENT)
Dept: HEPATOLOGY | Facility: CLINIC | Age: 74
End: 2024-02-15
Payer: MEDICARE

## 2024-02-15 PROCEDURE — 99205 OFFICE O/P NEW HI 60 MIN: CPT

## 2024-02-15 NOTE — ASSESSMENT
[FreeTextEntry1] : Patient with cirrhosis likely related to metabolically associated fatty liver disease Portal hypertension HCC status post Y90 with recent MRI showing no evidence of active disease Patient is here for a liver transplant evaluation.  He is accompanied by 2 of his sons who are both physicians.  Patient is also a retired neurologist. We discussed the following  History of HCC Role and efficacy of liver directed treatment and the risk of disease recurrence Role of liver transplant in the management of HCC MELD the score, HCC points in the process of liver allocation Low-salt high-protein diet His age and the coronary artery disease or the main barrier for the liver transplant at this time.  He has a very good functional status which makes his age less important.  However, he has 70% stenosis in LCX and 70% stenosis in RCA.  He already has multiple areas of ecchymosis where he injects insulin.  Family is concerned about the risk of PTCA, revascularization and the treatment with DAPT. Patient will be invited to attend the liver transplant class and make the decisions.

## 2024-02-15 NOTE — HISTORY OF PRESENT ILLNESS
[de-identified] : Cirrhosis Hypertension and coronary artery disease Hepatocellular carcinoma status post Y90 treatment no evidence of active disease as of January 2024 Small lung nodule, stable since 2021 Stress test 10/2023 small apical filling defect, EF 85% Cardiac cath 10/2023 LCX 70%, RCA 70% no stent due to liver disease normal LAD, normal EF   Patient is here for hepatology evaluation  No symptoms of hepatic decompensation No nausea vomiting or diarrhea No history of GI bleeding, jaundice or hepatic encephalopathy No  symptoms Vital signs are stable Clear lungs with no crackle or rhonchi Heart rates are regular with no murmur Soft abdomen with no tenderness or ascites No edema of extremities Labs and imaging were reviewed

## 2024-03-06 ENCOUNTER — APPOINTMENT (OUTPATIENT)
Dept: TRANSPLANT | Facility: CLINIC | Age: 74
End: 2024-03-06

## 2024-03-06 ENCOUNTER — NON-APPOINTMENT (OUTPATIENT)
Age: 74
End: 2024-03-06

## 2024-03-06 ENCOUNTER — APPOINTMENT (OUTPATIENT)
Dept: HEPATOLOGY | Facility: CLINIC | Age: 74
End: 2024-03-06
Payer: COMMERCIAL

## 2024-03-06 ENCOUNTER — APPOINTMENT (OUTPATIENT)
Dept: TRANSPLANT | Facility: CLINIC | Age: 74
End: 2024-03-06
Payer: MEDICARE

## 2024-03-06 VITALS
RESPIRATION RATE: 17 BRPM | DIASTOLIC BLOOD PRESSURE: 84 MMHG | OXYGEN SATURATION: 98 % | HEART RATE: 70 BPM | WEIGHT: 210 LBS | HEIGHT: 71 IN | TEMPERATURE: 99.1 F | BODY MASS INDEX: 29.4 KG/M2 | SYSTOLIC BLOOD PRESSURE: 154 MMHG

## 2024-03-06 PROCEDURE — 99204 OFFICE O/P NEW MOD 45 MIN: CPT

## 2024-03-06 PROCEDURE — 99214 OFFICE O/P EST MOD 30 MIN: CPT

## 2024-03-06 RX ORDER — URSODIOL 500 MG/1
TABLET ORAL
Refills: 0 | Status: COMPLETED | COMMUNITY
End: 2024-03-06

## 2024-03-06 RX ORDER — METOPROLOL SUCCINATE 100 MG/1
100 TABLET, EXTENDED RELEASE ORAL DAILY
Refills: 0 | Status: ACTIVE | COMMUNITY

## 2024-03-06 RX ORDER — ALPRAZOLAM 2 MG/1
2 TABLET ORAL
Refills: 0 | Status: ACTIVE | COMMUNITY

## 2024-03-06 RX ORDER — SERTRALINE HYDROCHLORIDE 100 MG/1
100 TABLET, FILM COATED ORAL DAILY
Refills: 0 | Status: ACTIVE | COMMUNITY

## 2024-03-06 RX ORDER — SILODOSIN 8 MG/1
8 CAPSULE ORAL DAILY
Refills: 0 | Status: ACTIVE | COMMUNITY

## 2024-03-06 RX ORDER — RIFAXIMIN 550 MG/1
550 TABLET ORAL TWICE DAILY
Refills: 0 | Status: ACTIVE | COMMUNITY

## 2024-03-06 NOTE — REASON FOR VISIT
[Initial] : an initial visit for [Liver Transplant Evaluation] : liver transplant evaluation [FreeTextEntry2] : Dr. Malagon [FreeTextEntry3] : GI Dr. Davison ([facility]) [phone]      [facility] Dr. Mandeep Davison

## 2024-03-06 NOTE — HISTORY OF PRESENT ILLNESS
[TextBox_42] : 74yo M, retired urologist w hx DM, HTN, HLD, pAfib (no AC 2/2 thrombocytopenia), CAD (s/p c cath 10/2023 = 2v CAD : dLcx 70% and mRCA 70%, nl LM and LAD, for medical mgmt), depression, anxiety, BPH, stable small lung nodule (since ) and cryptogenic, likely GONZALES liver cirrhosis with portal htn (splenomegaly, recanalized paraumbilical vein, paraoesophageal and tera splenic varices), and with recently diagnosed HCC found on 23 MRI, cw a (1.8 cm) seg 5 LR-5 HCC and a (3-4 cm) seg 8 LR 4 HCC.  Pt underwent Y90 2023 with favorable treatment response in seg 5 - subsequent imaging demonstrates lesion to be TR- Nonviable;   though the treated seg 4a/8 lesion demonstrates partial response to treatment -  LR-TR Viable.   No extrahep mets per Chest CT.  AFP 19 highest down to AFP 3 3/5/24) MELD 11 [ABO A-] BMI 27.89     A1c 5.7  BACKGROUND diagnosed with DM , started on Metformin, then started insulin   (a1c6 from 3/5) elevated LFTs 10 years ago, was taking antifungal med for toenails, noted elev LFTs Subsequently developed cirrhosis diagnosed as cryptogenic.  Liver biopsy 5 yrs ago  at [facility], read per pt son poss small duct PSC and another second read as inflam, ultimately cryptogenic Eventually developed portal hypertension  with splenomegaly and varices on imaging.  Aug, 2023, Screening MRI demonstrated a 3-4 cm LR 4 4a HCC and a 2 cm LI RADS 5 HCC in segment 5.  s/p Y90 2023 - [facility] Dr Palacios 10/2023 - Card Cath 2 vessel CAD: dLcx 70% and mRCA 70%, screening workup for clearance In liver transplant evaluation since 2024 Bilirubin 1.5 Albumin 3.6 INR 1.2 AFP 13.3  PAST SURGICAL HISTORY: Lap cholecystectomy 2015 Lumbar laminectomy.2016 cervical C3-4 laminectomy    SOCIAL HX TOBACCO - hookah once/month on occations, never cigarettes.   ETOH - 1-2 beer a month on occasion ILLICITS - denies  - 6 children   FAM HX none Mother, DM,  (from Covid)  ALLERGIES NKA  MEDICATION metoprolol 100mg daily  (did not tolerate nadolol, syncope, dizziness, fatigue) folic acid 1 mg daily Xanax 0.5 mg 1 tab PRN Zoloft 100 mg daily Lantus 100u/ml 40 unit(s) sq at bedtime Rapaflo 8 mg daily Ozempic 2 mg/1.5 mL (0.25 mg or 0.5 mg dose, 0.5 mg sq weekly  (no longer on, took a month, had GI symptoms, had pancreatitis, stopped 6 mos ago  LABS 24 Hg 11.7 PLT 50 Na 139 K 4.0 SCr 0.57 ALB 4 TB 1.8  AST 92 INR 1.23  A1C 5.7  STUDIES  MRCP 1/3/24 FINDINGS:   LOWER CHEST: Within normal limits.   LIVER: Cirrhosis. Posttreatment change again noted within segments 5 and 8. No new liver lesion.  Treated lesions as follows:     Lesion #: 1    Location: Segment 5    Size: 1.8 x 1.6 cm, previously 2.0 x 1.8 cm.    Enhancement: Yes; Thin peripheral rim/Treatment specific expected enhancement.    Washout: NO    LI-RADS v 2018 Category: LR-TR Nonviable        Lesion #: 2    Location: Straddling segment 4a/8.    Size: 2.6 x 1.9 cm, previously 2.9 x 2.5 cm.     Enhancement: Yes; Nodular/Masslike. The mass no longer demonstrate arterial phase hypervascularity and is decreased in size, consistent with positive   response to treatment. However, there is persistent masslike enhancement which is confluent with the radioembolization zone enhancement    Washout: NO    LI-RADS v 2018 Category: LR-TR Viable        BILE DUCTS: Normal caliber.    GALLBLADDER: Within normal limits.    SPLEEN: Enlarged, unchanged.    PANCREAS: Within normal limits.    ADRENALS: Within normal limits.    KIDNEYS/URETERS: No hydronephrosis. Bilateral renal cysts.    BOWEL: Within normal limits.     PERITONEUM: Trace free fluid.    VESSELS: Patent portal and hepatic veins. Recanalized paraumbilical vein. Paraesophageal and perisplenic varices. Atherosclerotic change.    RETROPERITONEUM/LYMPH NODES: No lymphadenopathy.      ABDOMINAL WALL: Within normal limits.    BONES: L4 vertebral body hemangioma, unchanged.       IMPRESSION:     1.  Cirrhosis with portal hypertension. No new liver lesion.    2.  Treated seg 5 lesion is decreased in size with thin peripheral/treatment specific enhancement; LR-TR Nonviable.    3.  Treated seg 4a/8 lesion demonstrates partial response to treatment at detailed above; LR-TR Viable.    Y90  23 Impression: Successful deployment of Y90 labeled microspheres for the treatment of hepatocellular carcinoma involving hepatic segments five and eight.  MRI 23 IMPRESSION:     *  Cirrhosis with portal hypertension and small volume ascites.    *  A 1.8 cm LI-RADS 5 observation within segment 5 (definitely HCC).    *  A 2.9 cm LI-RADS 4 observation within segment (8 *addended) (probably HCC).  Left Ht Cath 10/20/23 Diagnostic Summary:  - 2v CAD - distal LCx with 70% lesion, and mid RCA 70% lesion - Normal LAD, no proximal vessel stenoses - LVEDP 15, normal LVEF - Right radial access, no complications      Recommendations:- Given compromised synthetic liver function with severe thrombocytopenia, presence of varices, and infrequent GI bleeding, would recommend medical management for CAD      Cath 2019 mRCA 50%, dLCx 60%, and mLAD myocardial bridge. EF 60%     CT CHEST 23 No e/o metastatic disease within the chest Nonsp groundglass nodular opacities within RU and RLL, likely inflammatory in etiology Findings - stable 3mm RML pulm nodules stabel from  CT, cw benigh etiology  EGD 6 mos ago, [facility], by son's rpt no EV  COLON 6 mos ago good prep, polyps TA, AVM, Internal hem and external hem  INTERVAL HX: feels good denies abdl pain, NVD, CP, SOB, f/c never hematemesis never ascites, never ROBBIE enodrses trouble sleeping  - falling and sleeping through night r/t diagnosis HCC denies confusion, per son no confusion appetite good BM daily, some hemorrhoidal blood in toilet and TP - 2-3x/year,  (AVM, internal and external hem)

## 2024-03-06 NOTE — ASSESSMENT
[Fair candidate] : a fair candidate. We should proceed with our protocol for evaluation for liver transplantation.

## 2024-03-06 NOTE — PLAN
[Patient agrees to proceed with the full evaluation for liver transplantation.] : Patient agrees to proceed with the full evaluation for liver transplantation. 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  Treated with Y90, seems like good response.  MELD in the teens.  No ascites, no jaundice, no GI bleeding.  Imaging reviewed - cirrhosis, treated lesions, patient vessels, enlarged spleen, no ascites.  Has CAD with two 70% lesions.  Will need this reviewed with cardiology and anesthesia to assess whether he can proceed with transplant without stenting or whether he needs stenting prior.  Functional status currently seems ok though seems to be slowing down.  Agree with full work up and then assess overall picture to see whether transplant will benefit him.  Has living donors as a potential option.  Good to see him.

## 2024-03-06 NOTE — END OF VISIT
[Time Spent: ___ minutes] : I have spent [unfilled] minutes of time on the encounter. [Attestation] : The patient was explained alternatives, benefits and risk of liver transplantation, including but not limited to infection, bleeding, hepatic artery or portal vein thrombosis, primary dysfunction or primary non-function of the liver allograft, cardiopulmonary arrest, intra-operative death and other surgical, medical and psychosocial risks as outlined in the evaluation consent form.  The patient understands these risks and is willing to proceed with liver transplantation. The patient was also explained the need to remain on lifelong anti-rejection medications.  We discussed the risks and side effects of immunosuppressive medications including, but not limited to infection, cancer, weight gain, new onset or worsening of diabetes or hypertension in a temporary or permanent state, kidney dysfunction, water retention, back pain, constipation, diarrhea, dizziness, headache, joint pain, loss of appetite, nausea, stomach pain or upset, trouble sleeping, vomiting, and mental or mood changes.  An overview of the follow-up protocol was reviewed including outpatient visits, blood tests and the potential for hospital readmission.  The patient understands these risks and is willing to proceed with liver transplantation. We also discussed the available donor organ pool. We discussed the assessment of the  donor including age, cause of death, cardiac arrest, electrolyte abnormalities, course and length of hospital stay, use of vasopressors, hepatitis and HIV testing. We reviewed organ donor risk factors that could affect the success of the graft or the health of the patient, including, but not limited to, the donor's history, condition or age of the organs used, or the patient's potential risk of mirella the human immunodeficiency virus and other infectious diseases if the disease cannot be detected in an infected donor.  We discussed and defined the option of an extended criteria for cadaveric donors (Hepatitis B core Ab positive donor, Hepatitis C Ab positive donor, steatosis, older donors, split livers and DCD donors) and early and late outcomes of graft survival after transplantation. The options of  donor liver transplantation vs. live donor liver transplantation were discussed with the patient.  Differences between donation after cardiac death (DCD) compared to donation after brain death (DBD) liver transplantation were also fully disclosed and included lower graft survival rates, the increased incidence of hepatic artery stenosis, bile duct injury, ischemic cholangiopathy and increased re transplant rates seen in recipients of DCD donor livers. The use of the [place].S. Public Health Services (PHS) Guideline has defined some donors as "Increased Risk Donors" based on their history which may suggest socially increased risk behaviors was discussed. The patient is aware that if PHS increased risk donor is offered to the candidate, the transplant team will discuss the specifics to assist with making an informed decision. We discussed our post-transplant protocol of serology testing if the candidate receives an organ that is PHS increased risk. The patient was made aware that it is against the law to be paid or to pay to donate an organ.  If any money was given or will be given in exchange for receiving an organ, the patient may be subject to criminal prosecution, and any insurance coverage may no longer apply and patient may become personally responsible for all the health care costs associated with the donation, and private health information will be available to law enforcement agencies. We explained that we store vessels for subsequent later use in transplants.  Again, we discussed the extensive testing done on  donors prior to donation, however despite an extensive evaluation on the donor, there is potential risk a recipient may contract infectious diseases (HIV or Hepatitis) or cancer if they cannot be detected in the donor. In the cases where PHS Increased Risk donor vessels are used, we test the recipient per protocol post-transplant for any potential infectious disease transmission. The patient understands that there is the potential of use of  donor vessels and PHS increased risk donor vessels. The patient understands these risks and is willing to receive potentially PHS increased risk donor vessels. We also discussed the MELD allocation system in depth and the one-year observed and expected patient and graft survival rates according to latest data from the Scientific Registry for Transplant Recipients. These center specific outcomes were provided in comparison to the national one-year averages as described in the evaluation consent forms. Prior to signing consent, the patient was given an opportunity to ask questions.  After all concerns were addressed, informed consent was signed. Patient is aware that they may withdraw their consent for transplantation at any time.  Further, the patient is aware of the right to refuse an organ offer without penalty at any time.

## 2024-03-06 NOTE — HISTORY OF PRESENT ILLNESS
[90: Able to carry normal activity; minor signs or symptoms of disease.] : 90: Able to carry normal activity; minor signs or symptoms of disease.  [TextBox_42] : 72yo M, retired urologist w hx DM, HTN, HLD, pAfib (no AC 2/2 thrombocytopenia), CAD (s/p c cath 10/2023 = 2v CAD : dLcx 70% and mRCA 70%, nl LM and LAD, for medical mgmt), depression, anxiety, BPH, stable small lung nodule (since ) and cryptogenic, likely GONZLAES liver cirrhosis with portal htn (splenomegaly, recanalized paraumbilical vein, paraoesophageal and tera splenic varices), and with recently diagnosed HCC found on 23 MRI, cw a (1.8 cm) seg 5 LR-5 HCC and a (3-4 cm) seg 8 LR 4 HCC.  Pt underwent Y90 2023 with favorable treatment response in seg 5 - subsequent imaging demonstrates lesion to be TR- Nonviable;   though the treated seg 4a/8 lesion demonstrates partial response to treatment -  LR-TR Viable.   No extrahep mets per Chest CT.  AFP 19 highest down to AFP 3 3/5/24) MELD 11 [ABO A-] BMI 27.89     A1c 5.7  BACKGROUND diagnosed with DM , started on Metformin, then started insulin   (a1c6 from 3/5) elevated LFTs 10 years ago, was taking antifungal med for toenails, noted elev LFTs Subsequently developed cirrhosis diagnosed as cryptogenic.  Liver biopsy 5 yrs ago  at [facility], read per pt son poss small duct PSC and another second read as inflam, ultimately cryptogenic Eventually developed portal hypertension  with splenomegaly and varices on imaging.  Aug, 2023, Screening MRI demonstrated a 3-4 cm LR 4 4a HCC and a 2 cm LI RADS 5 HCC in segment 5.  s/p Y90 2023 - [facility] Dr Palacios 10/2023 - Card Cath 2 vessel CAD: dLcx 70% and mRCA 70%, screening workup for clearance In liver transplant evaluation since 2024 Bilirubin 1.5 Albumin 3.6 INR 1.2 AFP 13.3  PAST SURGICAL HISTORY: Lap cholecystectomy 2015 Lumbar laminectomy.2016 cervical C3-4 laminectomy    SOCIAL HX TOBACCO - hookah once/month on occations, never cigarettes.   ETOH - 1-2 beer a month on occasion ILLICITS - denies  - 6 children   FAM HX none Mother, DM,  (from Covid)  ALLERGIES NKA  MEDICATION metoprolol 100mg daily  (did not tolerate nadolol, syncope, dizziness, fatigue) folic acid 1 mg daily Xanax 0.5 mg 1 tab PRN Zoloft 100 mg daily Lantus 100u/ml 40 unit(s) sq at bedtime Rapaflo 8 mg daily Ozempic 2 mg/1.5 mL (0.25 mg or 0.5 mg dose, 0.5 mg sq weekly  (no longer on, took a month, had GI symptoms, had pancreatitis, stopped 6 mos ago  LABS 24 Hg 11.7 PLT 50 Na 139 K 4.0 SCr 0.57 ALB 4 TB 1.8  AST 92 INR 1.23  A1C 5.7  STUDIES  MRCP 1/3/24 FINDINGS:   LOWER CHEST: Within normal limits.   LIVER: Cirrhosis. Posttreatment change again noted within segments 5 and 8. No new liver lesion.  Treated lesions as follows:     Lesion #: 1    Location: Segment 5    Size: 1.8 x 1.6 cm, previously 2.0 x 1.8 cm.    Enhancement: Yes; Thin peripheral rim/Treatment specific expected enhancement.    Washout: NO    LI-RADS v 2018 Category: LR-TR Nonviable        Lesion #: 2    Location: Straddling segment 4a/8.    Size: 2.6 x 1.9 cm, previously 2.9 x 2.5 cm.     Enhancement: Yes; Nodular/Masslike. The mass no longer demonstrate arterial phase hypervascularity and is decreased in size, consistent with positive   response to treatment. However, there is persistent masslike enhancement which is confluent with the radioembolization zone enhancement    Washout: NO    LI-RADS v 2018 Category: LR-TR Viable        BILE DUCTS: Normal caliber.    GALLBLADDER: Within normal limits.    SPLEEN: Enlarged, unchanged.    PANCREAS: Within normal limits.    ADRENALS: Within normal limits.    KIDNEYS/URETERS: No hydronephrosis. Bilateral renal cysts.    BOWEL: Within normal limits.     PERITONEUM: Trace free fluid.    VESSELS: Patent portal and hepatic veins. Recanalized paraumbilical vein. Paraesophageal and perisplenic varices. Atherosclerotic change.    RETROPERITONEUM/LYMPH NODES: No lymphadenopathy.      ABDOMINAL WALL: Within normal limits.    BONES: L4 vertebral body hemangioma, unchanged.       IMPRESSION:     1.  Cirrhosis with portal hypertension. No new liver lesion.    2.  Treated seg 5 lesion is decreased in size with thin peripheral/treatment specific enhancement; LR-TR Nonviable.    3.  Treated seg 4a/8 lesion demonstrates partial response to treatment at detailed above; LR-TR Viable.    Y90  23 Impression: Successful deployment of Y90 labeled microspheres for the treatment of hepatocellular carcinoma involving hepatic segments five and eight.  MRI 23 IMPRESSION:     *  Cirrhosis with portal hypertension and small volume ascites.    *  A 1.8 cm LI-RADS 5 observation within segment 5 (definitely HCC).    *  A 2.9 cm LI-RADS 4 observation within segment (8 *addended) (probably HCC).  Left Ht Cath 10/20/23 Diagnostic Summary:  - 2v CAD - distal LCx with 70% lesion, and mid RCA 70% lesion - Normal LAD, no proximal vessel stenoses - LVEDP 15, normal LVEF - Right radial access, no complications      Recommendations:- Given compromised synthetic liver function with severe thrombocytopenia, presence of varices, and infrequent GI bleeding, would recommend medical management for CAD      Cath 2019 mRCA 50%, dLCx 60%, and mLAD myocardial bridge. EF 60%     CT CHEST 23 No e/o metastatic disease within the chest Nonsp groundglass nodular opacities within RU and RLL, likely inflammatory in etiology Findings - stable 3mm RML pulm nodules stabel from  CT, cw benigh etiology  EGD 6 mos ago, [facility], by son's rpt no EV  COLON 6 mos ago good prep, polyps TA, AVM, Internal hem and external hem  INTERVAL HX: feels good denies abdl pain, NVD, CP, SOB, f/c never hematemesis never ascites, never ROBBIE enodrses trouble sleeping  - falling and sleeping through night r/t diagnosis HCC denies confusion, per son no confusion appetite good BM daily, some hemorrhoidal blood in toilet and TP - 2-3x/year,  (AVM, internal and external hem)

## 2024-03-06 NOTE — ASSESSMENT
[FreeTextEntry1] : Patient came in for Initial liver transplant evaluation. Pt met with members of the team: Transplant Surgeon Dr. Dagher, Hepatologist , Transplant Coordinator, , Dietician, Pharmacist,  and ASA. Liver Transplant Education provided via Power Point presentation, overview given of transplant evaluation process, risks/benefits of transplantation, live donor/ donor transplantation,PHS risk/DCD/HCV organ sources, Waitlist management, post transplant care/medication/clinic follow-up. Provided and reviewed educational packet.  All questions answered and team contact information provided. Medications reviewed and updated. Patient reviewed and signed: Informed consent for liver transplant evaluation, HIPPA, [employer], email and HIV form. Patient was advised to inform the transplant coordinator with any changes in health status.  HCV donor acceptance consent HCV  Required testing/labs reviewed with pt.  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  No active disease in HCC seg 5.  Pt consented to liver txp evaluation.  The following testing is needed:  Liver Evaluation Labs HCC surveillance, 1/3/24 due April  (confirm if Dr. Palacios ordered MRCP) Chest CT, UTD 23, no mets disease w/in chest Smoking status - never cigarettes, Hookah on occasion,  Chest CT done Cardiac, Risk Factors, DM, CAD, refer to cards - Nuclear Stress done, son to get report, [facility] 10/2023 = 2v CAD : dLcx 70% and mRCA 70%, refer to Dr. Quintanilla EGD  - 6 mos ago, son to get rpt COLON - 6 mos, son to get report Infectious Disease consult - refer to Dr. Ladarius PROCTOR recommendations RD recommendations Dental - Medicare/[facility], no dental Donor option - not at this time

## 2024-03-13 ENCOUNTER — NON-APPOINTMENT (OUTPATIENT)
Age: 74
End: 2024-03-13

## 2024-03-18 NOTE — HISTORY OF PRESENT ILLNESS
[TextBox_42] : STERLING BRYANT is a 73 year old male who presents for an initial visit for.  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 Pt underwent Y90 2023 with favorable treatment response in seg 5 - subsequent imaging demonstrates lesion to be TR- Nonviable; though the treated seg 4a/8 lesion demonstrates partial response to treatment - LR-TR Viable. No extrahep mets per Chest CT. AFP 19 highest down to AFP 3 3/5/24) MELD 11 [ABO A-] BMI 27.89 A1c 5.7  BACKGROUND diagnosed with DM , started on Metformin, then started insulin  (a1c6 from 3/5) elevated LFTs 10 years ago, was taking antifungal med for toenails, noted elev LFTs Subsequently developed cirrhosis diagnosed as cryptogenic. Liver biopsy 5 yrs ago 2019 at [facility], read per pt son poss small duct PSC and another second read as inflam, ultimately cryptogenic Eventually developed portal hypertension  with splenomegaly and varices on imaging. Aug, 2023, Screening MRI demonstrated a 3-4 cm LR 4 4a HCC and a 2 cm LI RADS 5 HCC in segment 5. s/p Y90 2023 - [facility] Dr Palacios 10/2023 - Card Cath 2 vessel CAD: dLcx 70% and mRCA 70%, screening workup for clearance In liver transplant evaluation since 2024 Bilirubin 1.5 Albumin 3.6 INR 1.2 AFP 13.3  PAST SURGICAL HISTORY: Lap cholecystectomy 2015 Lumbar laminectomy.2016 cervical C3-4 laminectomy   SOCIAL HX TOBACCO - hookah once/month on occations, never cigarettes. ETOH - 1-2 beer a month on occasion ILLICITS - denies  - 6 children  FAM HX none Mother, DM,  (from Covid)  ALLERGIES NKA  MEDICATION metoprolol 100mg daily (did not tolerate nadolol, syncope, dizziness, fatigue) folic acid 1 mg daily Xanax 0.5 mg 1 tab PRN Zoloft 100 mg daily Lantus 100u/ml 40 unit(s) sq at bedtime Rapaflo 8 mg daily Ozempic 2 mg/1.5 mL (0.25 mg or 0.5 mg dose, 0.5 mg sq weekly (no longer on, took a month, had GI symptoms, had pancreatitis, stopped 6 mos ago   LABS 24 Hg 11.7 PLT 50 Na 139 K 4.0 SCr 0.57 ALB 4 TB 1.8  AST 92 INR 1.23  A1C 5.7  STUDIES  MRCP 1/3/24 FINDINGS: LOWER CHEST: Within normal limits. LIVER: Cirrhosis. Posttreatment change again noted within segments 5 and 8. No new liver lesion. Treated lesions as follows:   Lesion #: 1  Location: Segment 5  Size: 1.8 x 1.6 cm, previously 2.0 x 1.8 cm.  Enhancement: Yes; Thin peripheral rim/Treatment specific expected enhancement.  Washout: NO  LI-RADS v 2018 Category: LR-TR Nonviable   Lesion #: 2  Location: Straddling segment 4a/8.  Size: 2.6 x 1.9 cm, previously 2.9 x 2.5 cm.  Enhancement: Yes; Nodular/Masslike. The mass no longer demonstrate arterial phase hypervascularity and is decreased in size, consistent with positive  response to treatment. However, there is persistent masslike enhancement which is confluent with the radioembolization zone enhancement  Washout: NO  LI-RADS v 2018 Category: LR-TR Viable   BILE DUCTS: Normal caliber.  GALLBLADDER: Within normal limits.  SPLEEN: Enlarged, unchanged.  PANCREAS: Within normal limits.  ADRENALS: Within normal limits.  KIDNEYS/URETERS: No hydronephrosis. Bilateral renal cysts.  BOWEL: Within normal limits.  PERITONEUM: Trace free fluid.  VESSELS: Patent portal and hepatic veins. Recanalized paraumbilical vein. Paraesophageal and perisplenic varices. Atherosclerotic change.  RETROPERITONEUM/LYMPH NODES: No lymphadenopathy.  ABDOMINAL WALL: Within normal limits.  BONES: L4 vertebral body hemangioma, unchanged.  IMPRESSION:  1. Cirrhosis with portal hypertension. No new liver lesion.  2. Treated seg 5 lesion is decreased in size with thin peripheral/treatment specific enhancement; LR-TR Nonviable.  3. Treated seg 4a/8 lesion demonstrates partial response to treatment at detailed above; LR-TR Viable.  Y90 23 Impression: Successful deployment of Y90 labeled microspheres for the treatment of hepatocellular carcinoma involving hepatic segments five and eight.  MRI 23 IMPRESSION:  * Cirrhosis with portal hypertension and small volume ascites.  * A 1.8 cm LI-RADS 5 observation within segment 5 (definitely HCC).  * A 2.9 cm LI-RADS 4 observation within segment (8 *addended) (probably HCC).  Left Ht Cath 10/20/23 Diagnostic Summary: - 2v CAD - distal LCx with 70% lesion, and mid RCA 70% lesion - Normal LAD, no proximal vessel stenoses - LVEDP 15, normal LVEF - Right radial access, no complications  Recommendations:- Given compromised synthetic liver function with severe thrombocytopenia, presence of varices, and infrequent GI bleeding, would recommend medical management for CAD  Cath 2019 mRCA 50%, dLCx 60%, and mLAD myocardial bridge. EF 60%  T CHEST 23 No e/o metastatic disease within the chest Nonsp groundglass nodular opacities within RU and RLL, likely inflammatory in etiology Findings - stable 3mm RML pulm nodules stabel from  CT, cw benigh etiology

## 2024-03-18 NOTE — ASSESSMENT
[FreeTextEntry1] : Patient with cirrhosis likely related to metabolically associated fatty liver disease Portal hypertension HCC status post Y90 with recent MRI showing no evidence of active disease Patient is here for a liver transplant evaluation. He is accompanied by 2 of his sons who are both physicians. Patient is also a retired neurologist. We discussed the following  History of HCC Role and efficacy of liver directed treatment and the risk of disease recurrence Role of liver transplant in the management of HCC MELD the score, HCC points in the process of liver allocation Low-salt high-protein diet His age and the coronary artery disease or the main barrier for the liver transplant at this time. He has a very good functional status which makes his age less important. However, he has 70% stenosis in LCX and 70% stenosis in RCA. He already has multiple areas of ecchymosis where he injects insulin. Family is concerned about the risk of PTCA, revascularization and the treatment with DAPT.  Pre-OLT evaluation  I had a long discussion with patient and family and following topics were discussed Indication for liver transplant Overall review of the procedure Listing criteria MELD and organ allocation Need for compliance with care including visits, taking medications and avoiding alcohol and illicit substances  Options of living donor liver transplant, multiple listing, DCD, PHS risk organs as potential ways of improving wait time Potential risks associated with liver transplant   Quality measures medication and food interaction Diet Exercise   Patient is a good candidate for liver transplant.  Hois CAD are main barrier to transplant at this time. He will undergo a full cardiac work up and risk stratification will be done to see if the risk of OLT is justified.

## 2024-04-02 ENCOUNTER — NON-APPOINTMENT (OUTPATIENT)
Age: 74
End: 2024-04-02

## 2024-04-02 ENCOUNTER — APPOINTMENT (OUTPATIENT)
Dept: CARDIOLOGY | Facility: CLINIC | Age: 74
End: 2024-04-02
Payer: COMMERCIAL

## 2024-04-02 VITALS
HEART RATE: 94 BPM | WEIGHT: 215 LBS | BODY MASS INDEX: 29.99 KG/M2 | SYSTOLIC BLOOD PRESSURE: 150 MMHG | OXYGEN SATURATION: 99 % | DIASTOLIC BLOOD PRESSURE: 64 MMHG

## 2024-04-02 DIAGNOSIS — Z01.818 ENCOUNTER FOR OTHER PREPROCEDURAL EXAMINATION: ICD-10-CM

## 2024-04-02 DIAGNOSIS — I10 ESSENTIAL (PRIMARY) HYPERTENSION: ICD-10-CM

## 2024-04-02 PROCEDURE — G2211 COMPLEX E/M VISIT ADD ON: CPT

## 2024-04-02 PROCEDURE — 99205 OFFICE O/P NEW HI 60 MIN: CPT

## 2024-04-02 PROCEDURE — 93000 ELECTROCARDIOGRAM COMPLETE: CPT | Mod: NC

## 2024-04-03 ENCOUNTER — APPOINTMENT (OUTPATIENT)
Dept: MRI IMAGING | Facility: HOSPITAL | Age: 74
End: 2024-04-03

## 2024-04-03 ENCOUNTER — OUTPATIENT (OUTPATIENT)
Dept: OUTPATIENT SERVICES | Facility: HOSPITAL | Age: 74
LOS: 1 days | End: 2024-04-03
Payer: MEDICARE

## 2024-04-03 ENCOUNTER — APPOINTMENT (OUTPATIENT)
Dept: INFECTIOUS DISEASE | Facility: CLINIC | Age: 74
End: 2024-04-03
Payer: COMMERCIAL

## 2024-04-03 VITALS
WEIGHT: 215 LBS | BODY MASS INDEX: 30.1 KG/M2 | RESPIRATION RATE: 15 BRPM | OXYGEN SATURATION: 98 % | DIASTOLIC BLOOD PRESSURE: 66 MMHG | HEIGHT: 71 IN | HEART RATE: 79 BPM | SYSTOLIC BLOOD PRESSURE: 160 MMHG | TEMPERATURE: 97.2 F

## 2024-04-03 DIAGNOSIS — Z01.818 ENCOUNTER FOR OTHER PREPROCEDURAL EXAMINATION: ICD-10-CM

## 2024-04-03 DIAGNOSIS — Z98.890 OTHER SPECIFIED POSTPROCEDURAL STATES: Chronic | ICD-10-CM

## 2024-04-03 DIAGNOSIS — Z90.49 ACQUIRED ABSENCE OF OTHER SPECIFIED PARTS OF DIGESTIVE TRACT: Chronic | ICD-10-CM

## 2024-04-03 PROCEDURE — 74183 MRI ABD W/O CNTR FLWD CNTR: CPT | Mod: 26,MH

## 2024-04-03 PROCEDURE — 74183 MRI ABD W/O CNTR FLWD CNTR: CPT

## 2024-04-03 PROCEDURE — A9585: CPT

## 2024-04-03 PROCEDURE — 99204 OFFICE O/P NEW MOD 45 MIN: CPT

## 2024-04-03 NOTE — ASSESSMENT
[FreeTextEntry1] : #Infectious serologies CMV IgG - ordered but results not yet available - patient will bring requisition to local lab EBV IgG positive HAV total Ab positive HBV - sAg negative, sAb 8.3, cAb negative HCV Ab negative HIV 4th gen Ag/Ab negative TPAB negative MMR IgG (measles IgG, mumps IgG, rubella IgG) all positive VZV IgG positive Quantiferon - ordered but results not yet available - patient will bring requisition to local lab Strongy - ordered but results not yet available - patient will bring requisition to local lab   #Immunizations -- Pneumococcus: S/p PCV 13. PPSV 23 as well but unclear date - if not in the past 5 years should get booster -- Flu shot: 2023 -- TDAP: got in last 10 years -- Hep B: recommend give Heplisav x 1 dose and check repeat HBsAb quantitative one month later -- Shingles: s/p Shingrix x2 -- COVID19: x3 including one bivalent -- RSV - recommended  # Incidental CT chest finding of non-specific ground glass nodular opacities in RUL/RLL (9/2023) - No current respiratory symptoms. Ordered repeat CT chest on a routine basis to ensure these GGOs have resolved.   Pending completion of the above, there are no infectious contraindications to proceeding with organ transplant.

## 2024-04-03 NOTE — PHYSICAL EXAM
[General Appearance - Alert] : alert [General Appearance - In No Acute Distress] : in no acute distress [Sclera] : the sclera and conjunctiva were normal [Oropharynx] : the oropharynx was normal with no thrush [Neck Appearance] : the appearance of the neck was normal [Neck Cervical Mass (___cm)] : no neck mass was observed [Exaggerated Use Of Accessory Muscles For Inspiration] : no accessory muscle use [Edema] : there was no peripheral edema [Abdomen Soft] : soft [Abdomen Tenderness] : non-tender [Abdomen Mass (___ Cm)] : no abdominal mass palpated [No Palpable Adenopathy] : no palpable adenopathy [Skin Color & Pigmentation] : normal skin color and pigmentation [] : no rash [Oriented To Time, Place, And Person] : oriented to person, place, and time [Affect] : the affect was normal [FreeTextEntry1] : No tachycardia

## 2024-04-03 NOTE — HISTORY OF PRESENT ILLNESS
[FreeTextEntry1] : 73M w/ hx metabolic syndrome (A1c 6.0%), A.fib, CAD, mood disorder, and GONZALES cirrhosis c/b portal hypertension and HCC s/p Y90 (9/2023) who presents for pre-transplant ID evaluation.  Current infectious symptoms: None Infectious history: No history of severe or recurrent infections (other than recurrent strep throat as a child which resolved after tonsillectomy) Surgical history: Lumbar laminectomy 2016, Lap cholecystectomy 2015, C3-4 laminectomy 2000, Tonsillectomy age 15   Social History: Born in [place] and lived in [facility] until age 5. Then moved to [place] and has lived here since then. Lives in [place] with wife and two kids. Pets: No pets Occupation: Recently retired urologist Travel: Frequent international travel, but never for longer than ~1 week. No recent illnesses while abroad. Food: no raw/unpasteurized foods Hobbies: No outdoor hobbies, no hunting, no fishing No EtOH/tobacco/drug use

## 2024-04-11 NOTE — REASON FOR VISIT
[FreeTextEntry1] : Cardiology is Ben Villareal MD at [facility].  [facility] 10/23.  PAF x 25 years, had an ablation many years ago, comes back every few years.  no MI, PCI.  HTN diagnosed 2-3 years ago.  DM2 diagnosed 4 years, never on insulin, on Ozempic. A1c 6.0%. Dr. Haider Durant.  2023 Y90 radioembolization with Dr. Bui.   Born in [place]. Living in the  since .  GONZALES? (cryptogenic etiology) with newly diagnosed HCC 6 months ago.  liver biopsy done.  nail fungus, taking ketoconazole, LFTs high at that time.  progressive ankle edema. normal upon awakening.   Surgical history includes laparoscopic choley, laminectomy cervical 20 years ago, lumbar laminectomy 10 years tonsillectomy  His father is . MVA traumatic injury in , mechanical injury. constrictive pericarditis.  His mother is . She passed from Covid at age 90.  He has 6 brothers and 2 sisters. Oldest brother  of bladder cancer. Youngest brother  of CVA.   No formal exercise. He can walk for 2-3 blocks, then fatigued. Weakness of the legs. No chest discomfort, shortness of breath, palpitations, lightheadedness or syncope.        [Other: ____] : [unfilled] [Other: _____] : [unfilled]

## 2024-04-11 NOTE — CARDIOLOGY SUMMARY
[de-identified] : 4/2/2024. Sinus rhythm at 93 BPM. Normal axis. Normal intervals and morphologies, Early R wave in V1 with normal R-wave progression.  [de-identified] : 3/5/2024. AYDEN.  LVEF 55-60%.  Mild left atrial enlargement. No left atrial thrombus is visualized. Mild mitral valve regurgitation. Mild tricuspid valve regurgitation.  The aortic valve is sclerotic but opens well. Mild aortic valve regurgitation. No pericardial effusion.    [de-identified] : 11/2/2023. [facility]. Ben Villareal MD.  Diagnostic Summary:   - 2v CAD - distal LCx with 70% lesion, and mid RCA 70% lesion  - Normal LAD, no proximal vessel stenoses   - LVEDP 15, normal LVEF  - Right radial access, no complications

## 2024-04-11 NOTE — HISTORY OF PRESENT ILLNESS
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 He has a history PAF x 25 years, had an ablation many years ago, comes back every few years.  He was first told of elevated LFTs 10 years ago while he was taking antifungal medication for his toenails. Subsequently he developed cirrhosis diagnosed as cryptogenic. He had a liver biopsy (, [facility]). He did eventually develop portal hypertension () with splenomegaly and varices on imaging. 2023, Screening MRI demonstrated a 3-4 cm LR 4 4a HCC and a 2 cm LI RADS 5 HCC in segment 5. He is now status post Y90 radioembolization with Dr. Palacios.  Surgical history includes laparoscopic cholecystectomy, laminectomy (cervical, 20 years ago), lumbar laminectomy (10 years ago) and tonsillectomy.  His father is . MVA traumatic injury in , mechanical injury. constrictive pericarditis.  His mother is . She passed from Covid at age 90.  He has 6 brothers and 2 sisters. Oldest brother  of bladder cancer. Youngest brother  of CVA.  Born in [place]. Living in the [place] since .   No formal exercise. He can walk for 2-3 blocks, then fatigued. Weakness of the legs. No chest discomfort, shortness of breath, palpitations, lightheadedness or syncope.  Cardiology is Ben Villareal MD at [facility].

## 2024-04-11 NOTE — DISCUSSION/SUMMARY
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   Recent echocardiogram as above. C of 11/2/2023 showed 2v CAD - distal LCx with 70% lesion, and mid RCA 70% lesion. Will plan to discuss with Ben Villareal MD.    [EKG obtained to assist in diagnosis and management of assessed problem(s)] : EKG obtained to assist in diagnosis and management of assessed problem(s)

## 2024-04-22 VITALS — HEIGHT: 72 IN | WEIGHT: 210.1 LBS

## 2024-04-22 RX ORDER — FERROUS SULFATE 325(65) MG
1 TABLET ORAL
Refills: 0 | DISCHARGE

## 2024-04-22 RX ORDER — ALPRAZOLAM 0.25 MG
1 TABLET ORAL
Refills: 0 | DISCHARGE

## 2024-04-22 RX ORDER — SILODOSIN 4 MG/1
1 CAPSULE ORAL
Qty: 0 | Refills: 0 | DISCHARGE

## 2024-04-22 RX ORDER — RIFAXIMIN 200 MG/1
1 TABLET ORAL
Refills: 0 | DISCHARGE

## 2024-04-22 RX ORDER — FOLIC ACID 0.8 MG
1 TABLET ORAL
Refills: 0 | DISCHARGE

## 2024-04-22 RX ORDER — NADOLOL 80 MG/1
1 TABLET ORAL
Refills: 0 | DISCHARGE

## 2024-04-22 RX ORDER — SEMAGLUTIDE 0.68 MG/ML
0.5 INJECTION, SOLUTION SUBCUTANEOUS
Refills: 0 | DISCHARGE

## 2024-04-22 RX ORDER — CHLORHEXIDINE GLUCONATE 213 G/1000ML
1 SOLUTION TOPICAL ONCE
Refills: 0 | Status: DISCONTINUED | OUTPATIENT
Start: 2024-04-24 | End: 2024-05-08

## 2024-04-22 RX ORDER — INSULIN GLARGINE 100 [IU]/ML
40 INJECTION, SOLUTION SUBCUTANEOUS
Refills: 0 | DISCHARGE

## 2024-04-22 NOTE — H&P ADULT - CARDIOVASCULAR
normal/regular rate and rhythm/S1 S2 present/no gallops/no rub/no murmur/no JVD/pedal edema normal/regular rate and rhythm/S1 S2 present/no gallops/no rub/no murmur/no JVD/pedal edema/vascular

## 2024-04-22 NOTE — H&P ADULT - NSICDXPASTMEDICALHX_GEN_ALL_CORE_FT
PAST MEDICAL HISTORY:  BPH (benign prostatic hyperplasia)     Chronic atrial fibrillation     Coronary artery disease     Depression     Diabetes     Hepatocellular carcinoma     Hypertension     Paroxysmal atrial fibrillation     Transaminitis

## 2024-04-22 NOTE — H&P ADULT - ASSESSMENT
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 and depression/anxiety, now presents to [facility] for recommended cardiac cath w/ possible intervention if clinically indicated, in light of pts risk factors, CCS class II/III anginal symptoms and known CAD.    - ASA 3, Mallampati 3  - NO ASA/Plavix load prior to cath i/s/o severe thrombocytopenia, PLT today 46  - Pre cath IVF Hydration: NS @ 75cc/hr x 2hrs, NO bolus i/s/o portal HTN  - Pre cath consented    Risks & benefits of procedure and alternative therapy have been explained to the patient including but not limited to: allergic reaction, bleeding w/possible need for blood transfusion, infection, renal and vascular compromise, limb damage, arrhythmia, stroke, vessel dissection/perforation, Myocardial infarction, emergent CABG. Informed consent obtained and in chart.

## 2024-04-22 NOTE — H&P ADULT - NSICDXPASTSURGICALHX_GEN_ALL_CORE_FT
PAST SURGICAL HISTORY:  H/O percutaneous left heart catheterization     H/O prior ablation treatment     History of laparoscopic cholecystectomy     History of lumbar laminectomy

## 2024-04-22 NOTE — H&P ADULT - HISTORY OF PRESENT ILLNESS
Cardiologist: Dr. Cheryl Davison  Pharmacy: [facility]  Escort:    73yo M, retired urologist, PMHx hepatocellular carcinoma (s/o IR-guided radioembolization), portal HTN, thrombocytopenia (PLT 66k 4/4/24), CAD (s/p dx cardiac cath and medical mgmt as below 10/30/20), HTN, HLD, pAfib (not on AC 2/2 thrombocytopenia, s/p multiple ablations), remote hx subdural hematoma? and depression/anxiety, who presented to his outpt cardiologist, Dr. Cheryl Davison, c/o occasional AMBROSE x ___. Pt also w/ complaints of hand tremors and headaches. Of note, pt planned for liver transplant. Currently denies chest pain, palpitations, dizziness, LOC, N/V/D, hematochezia/melena, hematuria, fever/chills/sick contact, diaphoresis, orthopnea/PND, and leg swelling.     Past Testing:  - Diagnostic cardiac cath (10/30/23): dLcx 70% and mRCA 70%, nl LM and normal LAD (medical management).  - NST (10/18/23): small fixed defect of apical inferior; small mild defect in mid and basal inferior wall.   - Diagnostic cardiac cath 1/8/2019: mRCA 50%, dLCx 60%, and mLAD myocardial bridge. EF 60%    In light of patient's risk factors, CCS class __ anginal symptoms/anginal equivalents, abnormal NST results, and known CAD, patient is referred to [facility] for cardiac catheterization w/ possible intervention if clinically indicated.   Cardiologist: Dr. 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 and depression/anxiety, who presented to his outpt cardiologist c/o occasional AMBROSE x ___. Pt also w/ complaints of hand tremors and headaches. Of note, pt planned for liver transplant. Currently denies chest pain, palpitations, dizziness, LOC, N/V/D, hematochezia/melena, hematuria, fever/chills/sick contact, diaphoresis, orthopnea/PND, and leg swelling.     Past Testing:  - Diagnostic cardiac cath (10/30/23): dLcx 70% and mRCA 70%, nl LM and normal LAD (medical management).  - NST (10/18/23): small fixed defect of apical inferior; small mild defect in mid and basal inferior wall.   - Diagnostic cardiac cath 1/8/2019: mRCA 50%, dLCx 60%, and mLAD myocardial bridge. EF 60%    In light of patient's risk factors, CCS class __ anginal symptoms/anginal equivalents, and known CAD, patient is referred to [facility] for cardiac catheterization w/ possible intervention if clinically indicated.   Cardiologist: Dr. 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 and depression/anxiety, who presented to PMD c/o occasional AMBROSE x ___. Pt also w/ complaints of hand tremors and headaches. Of note, pt planned for liver transplant. Currently denies chest pain, palpitations, dizziness, LOC, N/V/D, hematochezia/melena, hematuria, fever/chills/sick contact, diaphoresis, orthopnea/PND, and leg swelling.     Past Testing:  - Diagnostic cardiac cath (10/30/23): dLcx 70% and mRCA 70%, nl LM and normal LAD (medical management).  - NST (10/18/23): small fixed defect of apical inferior; small mild defect in mid and basal inferior wall.   - Diagnostic cardiac cath 1/8/2019: mRCA 50%, dLCx 60%, and mLAD myocardial bridge. EF 60%    In light of patient's risk factors, CCS class __ anginal symptoms/anginal equivalents, and known CAD, patient is referred to [facility] for cardiac catheterization w/ possible intervention if clinically indicated.   Cardiologist: Dr. 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 and depression/anxiety, initially presented to PMD c/o occasional AMBROSE w/ mod exertion x few months w/ associated fatigue. Pt also w/ endorses hand tremors and HAs, denies any weakness/numbness, or visual changes. He also denies any palpitations, dizziness, lightheadedness, syncope, LE edema, orthopnea, PND, N/V/D, abd pain, cough, congestion, fever, chills or recent sick contact.     Past Testing:  - dx LHC @ [facility] 10/30/23: LM mild luminal, LAD mild luminal, dLCx 70%, mRCA 70%  - NST 10/18/23: small fixed defect of apical inferior; small mild defect in mid and basal inferior wall.     In light of patient's risk factors, CCS class II/III anginal symptoms/anginal equivalents, and known CAD, patient is referred to [facility] for cardiac catheterization w/ possible intervention if clinically indicated.   Cardiologist: Dr. 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 and depression/anxiety, initially presented to PMD c/o occasional AMBROSE w/ mod exertion x few months w/ associated fatigue. Pt also w/ endorses hand tremors and HAs, denies any weakness/numbness, or visual changes. He also denies any palpitations, dizziness, lightheadedness, syncope, LE edema, orthopnea, PND, N/V/D, abd pain, cough, congestion, fever, chills or recent sick contact.     Past Testing:  - dx LHC @ [facility] 10/30/23: LM mild luminal, LAD mild luminal, dLCx 70%, mRCA 70%  - NST 10/18/23: small fixed defect of apical inferior; small mild defect in mid and basal inferior wall.     In light of patient's risk factors, CCS class II/III anginal symptoms/anginal equivalents, and known CAD, patient is referred to [facility] for cardiac catheterization w/ possible intervention if clinically indicated.

## 2024-04-22 NOTE — H&P ADULT - NSHPLABSRESULTS_GEN_ALL_CORE
8.8    3.48  )-----------( 46       ( 24 Apr 2024 06:48 )             30.2               PT/INR - ( 24 Apr 2024 06:48 )   PT: 15.6 sec;   INR: 1.38          PTT - ( 24 Apr 2024 06:48 )  PTT:31.4 sec 8.8    3.48  )-----------( 46       ( 24 Apr 2024 06:48 )             30.2       04-24    138  |  107  |  17  ----------------------------<  161<H>  3.9   |  23  |  0.54    Ca    8.6      24 Apr 2024 06:48  Mg     2.0     04-24    TPro  5.5<L>  /  Alb  3.5  /  TBili  1.4<H>  /  DBili  x   /  AST  48<H>  /  ALT  66<H>  /  AlkPhos  191<H>  04-24      PT/INR - ( 24 Apr 2024 06:48 )   PT: 15.6 sec;   INR: 1.38          PTT - ( 24 Apr 2024 06:48 )  PTT:31.4 sec    CARDIAC MARKERS ( 24 Apr 2024 06:48 )  x     / x     / 116 U/L / x     / 4.1 ng/mL        Urinalysis Basic - ( 24 Apr 2024 06:48 )    Color: x / Appearance: x / SG: x / pH: x  Gluc: 161 mg/dL / Ketone: x  / Bili: x / Urobili: x   Blood: x / Protein: x / Nitrite: x   Leuk Esterase: x / RBC: x / WBC x   Sq Epi: x / Non Sq Epi: x / Bacteria: x

## 2024-04-22 NOTE — H&P ADULT - NSHPPOADEEPVENOUSTHROMB_GEN_A_CORE
Infusion Nursing Note:  Jennifer Cervantes presents today for add-on infusion.    Patient seen by provider today: No   present during visit today: Not Applicable.    Note: Patient received dilaudid x3, benadryl, and 1 L LR bolus per therapy plan for 9/10 back pain with some relief. Pain improved and stable upon discharge.      Intravenous Access:  Implanted Port.    Treatment Conditions:  Results reviewed, labs MET treatment parameters, ok to proceed with treatment.      Post Infusion Assessment:  Patient tolerated infusion without incident.       Discharge Plan:   Patient and/or family verbalized understanding of discharge instructions and all questions answered.      Vicenta Boone RN     no

## 2024-04-24 ENCOUNTER — OUTPATIENT (OUTPATIENT)
Dept: OUTPATIENT SERVICES | Facility: HOSPITAL | Age: 74
LOS: 1 days | Discharge: HOME | End: 2024-04-24
Payer: MEDICARE

## 2024-04-24 DIAGNOSIS — Z98.890 OTHER SPECIFIED POSTPROCEDURAL STATES: Chronic | ICD-10-CM

## 2024-04-24 DIAGNOSIS — Z90.49 ACQUIRED ABSENCE OF OTHER SPECIFIED PARTS OF DIGESTIVE TRACT: Chronic | ICD-10-CM

## 2024-04-24 LAB
A1C WITH ESTIMATED AVERAGE GLUCOSE RESULT: 5.6 % — SIGNIFICANT CHANGE UP (ref 4–5.6)
ALBUMIN SERPL ELPH-MCNC: 3.5 G/DL — SIGNIFICANT CHANGE UP (ref 3.3–5)
ALP SERPL-CCNC: 191 U/L — HIGH (ref 40–120)
ALT FLD-CCNC: 66 U/L — HIGH (ref 10–45)
ANION GAP SERPL CALC-SCNC: 8 MMOL/L — SIGNIFICANT CHANGE UP (ref 5–17)
APTT BLD: 31.4 SEC — SIGNIFICANT CHANGE UP (ref 24.5–35.6)
AST SERPL-CCNC: 48 U/L — HIGH (ref 10–40)
BASE EXCESS BLDV CALC-SCNC: -1.2 MMOL/L — SIGNIFICANT CHANGE UP (ref -2–3)
BASOPHILS # BLD AUTO: 0.03 K/UL — SIGNIFICANT CHANGE UP (ref 0–0.2)
BASOPHILS NFR BLD AUTO: 0.9 % — SIGNIFICANT CHANGE UP (ref 0–2)
BILIRUB SERPL-MCNC: 1.4 MG/DL — HIGH (ref 0.2–1.2)
BLOOD GAS VENOUS - BLOOD UREA NITROGEN: 18 MG/DL — SIGNIFICANT CHANGE UP (ref 7–23)
BLOOD GAS VENOUS - CREATININE: 0.6 MG/DL — SIGNIFICANT CHANGE UP (ref 0.5–1.3)
BUN SERPL-MCNC: 17 MG/DL — SIGNIFICANT CHANGE UP (ref 7–23)
CA-I SERPL-SCNC: 1.2 MMOL/L — SIGNIFICANT CHANGE UP (ref 1.15–1.33)
CALCIUM SERPL-MCNC: 8.6 MG/DL — SIGNIFICANT CHANGE UP (ref 8.4–10.5)
CHLORIDE SERPL-SCNC: 107 MMOL/L — SIGNIFICANT CHANGE UP (ref 96–108)
CHOLEST SERPL-MCNC: 114 MG/DL — SIGNIFICANT CHANGE UP
CK MB CFR SERPL CALC: 4.1 NG/ML — SIGNIFICANT CHANGE UP (ref 0–6.7)
CK SERPL-CCNC: 116 U/L — SIGNIFICANT CHANGE UP (ref 30–200)
CO2 BLDV-SCNC: 25.6 MMOL/L — SIGNIFICANT CHANGE UP (ref 22–26)
CO2 SERPL-SCNC: 23 MMOL/L — SIGNIFICANT CHANGE UP (ref 22–31)
COHGB MFR BLDV: 2.3 % — SIGNIFICANT CHANGE UP
CREAT SERPL-MCNC: 0.54 MG/DL — SIGNIFICANT CHANGE UP (ref 0.5–1.3)
EGFR: 105 ML/MIN/1.73M2 — SIGNIFICANT CHANGE UP
EOSINOPHIL # BLD AUTO: 0.11 K/UL — SIGNIFICANT CHANGE UP (ref 0–0.5)
EOSINOPHIL NFR BLD AUTO: 3.2 % — SIGNIFICANT CHANGE UP (ref 0–6)
ESTIMATED AVERAGE GLUCOSE: 114 MG/DL — SIGNIFICANT CHANGE UP (ref 68–114)
GAS PNL BLDV: 136 MMOL/L — SIGNIFICANT CHANGE UP (ref 136–145)
GLUCOSE BLDC GLUCOMTR-MCNC: 149 MG/DL — HIGH (ref 70–99)
GLUCOSE BLDC GLUCOMTR-MCNC: 97 MG/DL — SIGNIFICANT CHANGE UP (ref 70–99)
GLUCOSE BLDV-MCNC: 143 MG/DL — HIGH (ref 70–99)
GLUCOSE SERPL-MCNC: 161 MG/DL — HIGH (ref 70–99)
HCO3 BLDV-SCNC: 24 MMOL/L — SIGNIFICANT CHANGE UP (ref 22–29)
HCT VFR BLD CALC: 30.2 % — LOW (ref 39–50)
HCT VFR BLDA CALC: 27 % — SIGNIFICANT CHANGE UP
HCT VFR BLDA CALC: 28 % — SIGNIFICANT CHANGE UP
HDLC SERPL-MCNC: 47 MG/DL — SIGNIFICANT CHANGE UP
HGB BLD CALC-MCNC: 9.1 G/DL — LOW (ref 12.6–17.4)
HGB BLD-MCNC: 8.8 G/DL — LOW (ref 13–17)
IMM GRANULOCYTES NFR BLD AUTO: 0.3 % — SIGNIFICANT CHANGE UP (ref 0–0.9)
INR BLD: 1.38 — HIGH (ref 0.85–1.18)
LIPID PNL WITH DIRECT LDL SERPL: 47 MG/DL — SIGNIFICANT CHANGE UP
LYMPHOCYTES # BLD AUTO: 0.52 K/UL — LOW (ref 1–3.3)
LYMPHOCYTES # BLD AUTO: 14.9 % — SIGNIFICANT CHANGE UP (ref 13–44)
MAGNESIUM SERPL-MCNC: 2 MG/DL — SIGNIFICANT CHANGE UP (ref 1.6–2.6)
MCHC RBC-ENTMCNC: 22.6 PG — LOW (ref 27–34)
MCHC RBC-ENTMCNC: 29.1 GM/DL — LOW (ref 32–36)
MCV RBC AUTO: 77.4 FL — LOW (ref 80–100)
METHGB MFR BLDV: 0.4 % — SIGNIFICANT CHANGE UP
MONOCYTES # BLD AUTO: 0.38 K/UL — SIGNIFICANT CHANGE UP (ref 0–0.9)
MONOCYTES NFR BLD AUTO: 10.9 % — SIGNIFICANT CHANGE UP (ref 2–14)
NEUTROPHILS # BLD AUTO: 2.43 K/UL — SIGNIFICANT CHANGE UP (ref 1.8–7.4)
NEUTROPHILS NFR BLD AUTO: 69.8 % — SIGNIFICANT CHANGE UP (ref 43–77)
NON HDL CHOLESTEROL: 67 MG/DL — SIGNIFICANT CHANGE UP
NRBC # BLD: 0 /100 WBCS — SIGNIFICANT CHANGE UP (ref 0–0)
PCO2 BLDV: 43 MMHG — SIGNIFICANT CHANGE UP (ref 42–55)
PH BLDV: 7.36 — SIGNIFICANT CHANGE UP (ref 7.32–7.43)
PLATELET # BLD AUTO: 46 K/UL — LOW (ref 150–400)
PO2 BLDV: 65 MMHG — HIGH (ref 25–45)
POTASSIUM BLDV-SCNC: 3.9 MMOL/L — SIGNIFICANT CHANGE UP (ref 3.5–5.1)
POTASSIUM SERPL-MCNC: 3.9 MMOL/L — SIGNIFICANT CHANGE UP (ref 3.5–5.3)
POTASSIUM SERPL-SCNC: 3.9 MMOL/L — SIGNIFICANT CHANGE UP (ref 3.5–5.3)
PROT SERPL-MCNC: 5.5 G/DL — LOW (ref 6–8.3)
PROTHROM AB SERPL-ACNC: 15.6 SEC — HIGH (ref 9.5–13)
RBC # BLD: 3.9 M/UL — LOW (ref 4.2–5.8)
RBC # FLD: 19 % — HIGH (ref 10.3–14.5)
SAO2 % BLDV: 94 % — HIGH (ref 67–88)
SODIUM SERPL-SCNC: 138 MMOL/L — SIGNIFICANT CHANGE UP (ref 135–145)
TRIGL SERPL-MCNC: 107 MG/DL — SIGNIFICANT CHANGE UP
WBC # BLD: 3.48 K/UL — LOW (ref 3.8–10.5)
WBC # FLD AUTO: 3.48 K/UL — LOW (ref 3.8–10.5)

## 2024-04-24 PROCEDURE — 82553 CREATINE MB FRACTION: CPT

## 2024-04-24 PROCEDURE — 85730 THROMBOPLASTIN TIME PARTIAL: CPT

## 2024-04-24 PROCEDURE — 83036 HEMOGLOBIN GLYCOSYLATED A1C: CPT

## 2024-04-24 PROCEDURE — 80061 LIPID PANEL: CPT

## 2024-04-24 PROCEDURE — 0523T NTRAPX C FFR W/3D FUNCJL MAP: CPT

## 2024-04-24 PROCEDURE — C1769: CPT

## 2024-04-24 PROCEDURE — 82550 ASSAY OF CK (CPK): CPT

## 2024-04-24 PROCEDURE — 82803 BLOOD GASES ANY COMBINATION: CPT

## 2024-04-24 PROCEDURE — 93010 ELECTROCARDIOGRAM REPORT: CPT

## 2024-04-24 PROCEDURE — 93005 ELECTROCARDIOGRAM TRACING: CPT

## 2024-04-24 PROCEDURE — 82962 GLUCOSE BLOOD TEST: CPT

## 2024-04-24 PROCEDURE — 99152 MOD SED SAME PHYS/QHP 5/>YRS: CPT

## 2024-04-24 PROCEDURE — 83735 ASSAY OF MAGNESIUM: CPT

## 2024-04-24 PROCEDURE — C1894: CPT

## 2024-04-24 PROCEDURE — C1887: CPT

## 2024-04-24 PROCEDURE — 80053 COMPREHEN METABOLIC PANEL: CPT

## 2024-04-24 PROCEDURE — 85610 PROTHROMBIN TIME: CPT

## 2024-04-24 PROCEDURE — 85025 COMPLETE CBC W/AUTO DIFF WBC: CPT

## 2024-04-24 PROCEDURE — 36415 COLL VENOUS BLD VENIPUNCTURE: CPT

## 2024-04-24 PROCEDURE — 93458 L HRT ARTERY/VENTRICLE ANGIO: CPT

## 2024-04-24 PROCEDURE — 93458 L HRT ARTERY/VENTRICLE ANGIO: CPT | Mod: 26

## 2024-04-24 RX ORDER — SODIUM CHLORIDE 9 MG/ML
1000 INJECTION INTRAMUSCULAR; INTRAVENOUS; SUBCUTANEOUS
Refills: 0 | Status: DISCONTINUED | OUTPATIENT
Start: 2024-04-24 | End: 2024-05-08

## 2024-04-24 RX ORDER — RIFAXIMIN 200 MG/1
1 TABLET ORAL
Refills: 0 | DISCHARGE

## 2024-04-24 RX ORDER — METOPROLOL TARTRATE 50 MG
1 TABLET ORAL
Refills: 0 | DISCHARGE

## 2024-04-24 RX ORDER — SERTRALINE 25 MG/1
1 TABLET, FILM COATED ORAL
Refills: 0 | DISCHARGE

## 2024-04-24 RX ORDER — SILODOSIN 4 MG/1
1 CAPSULE ORAL
Refills: 0 | DISCHARGE

## 2024-04-24 RX ORDER — SPIRONOLACTONE 25 MG/1
1 TABLET, FILM COATED ORAL
Refills: 0 | DISCHARGE

## 2024-04-24 RX ORDER — EMPAGLIFLOZIN 10 MG/1
1 TABLET, FILM COATED ORAL
Refills: 0 | DISCHARGE

## 2024-04-24 RX ORDER — LACTULOSE 10 G/15ML
15 SOLUTION ORAL
Refills: 0 | DISCHARGE

## 2024-04-24 RX ADMIN — SODIUM CHLORIDE 100 MILLILITER(S): 9 INJECTION INTRAMUSCULAR; INTRAVENOUS; SUBCUTANEOUS at 12:40

## 2024-04-24 NOTE — PROGRESS NOTE ADULT - SUBJECTIVE AND OBJECTIVE BOX
Interventional Cardiology PA SDA Discharge Note    Patient without complaints. 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 and depression/anxiety, whom in light of patient's risk factors, CCS class II/III anginal symptoms/anginal equivalents, and known CAD, was referred to [facility] for cardiac catheterization w/ possible intervention if clinically indicated.    Patient is s/p cath 4/24/24, dLCx large codominant 50-60%, FFR negative @ 0.85. RCA large codominant, mRCA 50%, FFR negative @ 0.86. MIRTA flow 3 all vessels. EDP 19.     1.	Stable for discharge as per attending Dr. Villareal after bed rest, pt voids, groin/wrist stable and 30 minutes of ambulation.  2.	Follow-up with PMD/Cardiologist Dr. Davison in 1-2 weeks  3.	Discharged forms signed and copies in chart   4.          No aspirin to be ordered in setting of severe thrombocytopenia

## 2024-04-29 ENCOUNTER — NON-APPOINTMENT (OUTPATIENT)
Age: 74
End: 2024-04-29

## 2024-04-29 ENCOUNTER — APPOINTMENT (OUTPATIENT)
Dept: HEPATOLOGY | Facility: CLINIC | Age: 74
End: 2024-04-29

## 2024-04-29 VITALS
BODY MASS INDEX: 30.66 KG/M2 | SYSTOLIC BLOOD PRESSURE: 135 MMHG | OXYGEN SATURATION: 96 % | HEIGHT: 71 IN | WEIGHT: 219 LBS | HEART RATE: 74 BPM | RESPIRATION RATE: 16 BRPM | TEMPERATURE: 98.8 F | DIASTOLIC BLOOD PRESSURE: 62 MMHG

## 2024-04-29 PROBLEM — C22.0 LIVER CELL CARCINOMA: Chronic | Status: ACTIVE | Noted: 2024-04-22

## 2024-04-29 PROCEDURE — 99214 OFFICE O/P EST MOD 30 MIN: CPT

## 2024-04-29 NOTE — ASSESSMENT
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 Pt underwent Y90 Sept,2023 with favorable treatment response of both lesions per most recent April imaging- LR-TR Viable (first 2 imaging post txt showed some viability and equivocalness). No extrahep mets per Chest CT. AFP 19 highest down to AFP 3 3/5/24.  Pt currently in liver txp evaluation.  MELD 11 [ABO A-]  # HCC surveillance MRCP 4/3/24 cw 2 treated LR-TR Nonviable lesions, no new hepatic lesions  DUE JULY  # HCC hx * HCC diagnosed per MRI 9/11/23 cw: seg 5 (2.0 x 1.6 cm) LR-5 (corrected) and a seg 4a (2.9 cm) LI-RADS 4 observation (probably HCC). * S/p Y90 9/25/23 of both lesions - 2 right lobe hepatocellular carcinomas in seg 8 and seg 5 * Subsequent imaging: - MRCP 10/27/23 - cw post treatment response Equivocal in seg 5 and Viable in seg 4a/8: Seg 5 lesion w favorable response to treatment, with few tiny internal enhancing nodular foci; LR-TR Equivocol.   Seg 4a/8 lesion is within the radioembolization zone, although demonstrates no change at this time with persistent arterial phase hyperenhancement; LR-TR Viable.   No new liver lesion - MRCP 1/3/24 - treated lesions both nonviable Treated seg5 lesion is decreased in size with thin peripheral/treatment specific enhancement; LR-TR Nonviable.   Treated segment 4a/8 lesion demonstrates partial response to treatment at detailed above; LR-TR Viable  (The mass no longer demonstrate arterial phase hypervascularity and is decreased in size, consistent with positive response to treatment. However, there is persistent masslike enhancement which is confluent with the radioembolization zone enhancement) -MRCP 4/4/24 - 2 Treated hepatic lesions, both LR-TR nonviable; no new hepatic lesions.  Transplant Evaluation HCV donor consented YES  Pt completed his w/u -pending cardiology to weigh in and then will present to Selection Committee to assess candidacy.  CT Chest updated (son to fax result) Card Cath done 4/24/24 - dLCx large codominant 50-60%, FFR negative @ 0.85. RCA large codominant, mRCA 50%, FFR negative @ 0.86. MIRTA flow 3 all vessels. EDP 19. awaiting Dr. Quintanilla input EGD/COLON - UTD, son to email results ID cleared - HBV booster Friday 26th,, pending update re pending labs (QTF and strongy) and Chest CT ESTHELA, ESTHELA Santos, cleared 3/6/24. KRISTINE, Ethel Peña RD, cleared 3/7/24 Donor option, possibly  will need updated labs with AFP and ABO  RTC 6 wks

## 2024-04-29 NOTE — HISTORY OF PRESENT ILLNESS
[History of HCC] : History of hepatocellular carcinoma [History of Local-regional Treatment] : History of local-regional treatment [90: Able to carry normal activity; minor signs or symptoms of disease.] : 90: Able to carry normal activity; minor signs or symptoms of disease.  [TextBox_42] : 72yo M, retired urologist w hx DM, HTN, pAfib s/p ablation  (no AC 2/2 thrombocytopenia), CAD (s/p c cath 10/2023 = 2v CAD : dLcx 70% and mRCA 70%, nl LM and LAD, for medical mgmt), depression, anxiety, BPH, stable small lung nodule (since ) and cryptogenic, likely GONZALES liver cirrhosis with portal htn (splenomegaly, recanalized paraumbilical vein, paraoesophageal and tera splenic varices), and with recently diagnosed HCC found on 23 MRI, cw a (1.8 cm) seg 5 LR-5 HCC and a (3-4 cm) seg 8 LR 4 HCC. Pt underwent Y90 2023 with favorable treatment response in seg 5 - subsequent imaging demonstrates lesion to be TR- Nonviable; though the treated seg 4a/8 lesion demonstrates partial response to treatment - LR-TR Viable. No extrahep mets per Chest CT. AFP 19 highest down to AFP 3 3/5/24) MELD 11 [ABO A-] BMI 27.89 A1c 5.7  BACKGROUND diagnosed with DM , started on Metformin, then started insulin  (a1c6 from 3/5), stopped insulin and now on jardiance monotherapy, A1c 6.2 elevated LFTs 10 years ago, was taking antifungal med (Lamisil) for toenails, noted elev LFTs, that never came down Subsequently developed cirrhosis diagnosed as cryptogenic. Liver biopsy 5 yrs ago  at [facility], read per pt son poss small duct PSC and another second read as inflam, ultimately cryptogenic Eventually developed portal hypertension  with splenomegaly and varices on imaging. Aug, 2023, Screening MRI demonstrated a 3-4 cm LR 4 4a HCC and a 2 cm LI RADS 5 HCC in segment 5. s/p Y90 2023 - [facility] Dr Palacios 10/2023 - Card Cath 2 vessel CAD: dLcx 70% and mRCA 70%, screening workup for clearance In liver transplant evaluation since 2024 Bilirubin 1.5 Albumin 3.6 INR 1.2 AFP 13.3  PAST SURGICAL HISTORY: Lap cholecystectomy 2015 Lumbar L4-L5 laminectomy.2016 cervical C3-4 laminectomy  tonsillectomy as child  SOCIAL HX TOBACCO - hookah once/month on occasion, never cigarettes. ETOH - 1-2 beer a month on occasion, none since Aug, 2023 ILLICITS - denies.  - 6 children covid vax x4 (Pfizer) covid infection  - fever/chills - pre vax  FAM HX no liver disease Mother, DM,  (from Covid) age 92 no heart dis in family no GI cancer in family or other cancers  ALLERGIES NKA  MEDICATION metoprolol 100mg daily (did not tolerate nadolol, syncope, dizziness, fatigue) folic acid 1 mg daily Xanax 0.5 mg 1 tab PRN Zoloft 100 mg daily Jardiance 10mg po (was on insulin 6-7 mos, didn't work) Rapaflo 8 mg daily Ozempic 2 mg/1.5 mL (0.25 mg or 0.5 mg dose, 0.5 mg sq weekly (no longer on, took a month, had GI symptoms, had pancreatitis, stopped 6 mos ago lactulose  Rifax 550mg bid  LABS 24 Hg 11.7 PLT 50 Na 139 K 4.0 SCr 0.57 ALB 4 TB 1.8  AST 92 INR 1.23  A1C 5.7  STUDIES  MRI/MRCP  2023    Corrected (In HIE)       **************A D D E N D U M**************   Please note, the LI-RADS 5 observation within segment 5 measures 2.0 x 1.6 cm on both the precontrast T1 sequence (23, 36) and the portal venous phase (29, 36), and this is felt to represent a more accurate measurement of the lesion.    IMPRESSION:     *  Cirrhosis with portal hypertension and small volume ascites.    *  A 1.8 cm LI-RADS 5 observation within segment 5 (definitely HCC).    *  A 2.9 cm LI-RADS 4 observation within segment 4a (probably HCC).  S/p Y90  23  Subsegmental radio-embolization of 2 right lobe hepatocellular carcinomas in segment 8 and segment 5 as described above.  MRCP 10/27/23 1.  Cirrhosis with portal hypertension.    2.  Segment 5 lesion demonstrates favorable response to treatment, with few tiny internal enhancingnodular foci; LR-TR Equivocol.    3.  Segment 4a/8 lesion is within the radioembolization zone, although demonstrates no change at this time with persistent arterial phase hyperenhancement; LR-TR Viable.    MRCP 1/3/24 1.  Cirrhosis with portal hypertension. No new liver lesion.    2.  Treated segment 5 lesion is decreased in size with thin peripheral/treatment specific enhancement; LR-TR Nonviable.    3.  Treated segment 4a/8 lesion demonstrates partial response to treatment at detailed above; LR-TR Viable  (The mass no longer demonstrate arterial phase hypervascularity and is decreased in size, consistent with positive response to treatment. However, there is persistent masslike enhancement which is confluent with the radioembolization zone enhancement)  MRCP 24 1.  Cirrhosis with portal hypertension. No new hepatic lesions. 2.  2 Treated hepatic lesions, both LR-TR nonviable  L Ht Cath 2024 dLCx large codominant 50-60%, FFR negative @ 0.85. RCA large codominant, mRCA 50%, FFR negative @ 0.86.  MIRTA flow 3 all vessels. EDP 19.  Left Ht Cath 10/20/23 Diagnostic Summary: - 2v CAD - distal LCx with 70% lesion, and mid RCA 70% lesion - Normal LAD, no proximal vessel stenoses - LVEDP 15, normal LVEF - Right radial access, no complications  Recs:- Given compromised synthetic liver function with severe thrombocytopenia, presence of varices, and infrequent GI bleeding, would recommend medical management for CAD  Cath 2019 mRCA 50%, dLCx 60%, and mLAD myocardial bridge. EF 60%  Chest CT w/o IVC  24 interval resolution of the previously seen small bilat pleural effusions cirr liver with portal htn splenomegaly with varices and a stable hepatic hypodensity involving the inferior aspect of the R Lobe of the liver s/p cholecystectomy small cardiomegaly coronary artery calcifications degenerative changes of the thoracic spine  CT CHEST 23 No e/o metastatic disease within the chest Nonsp groundglass nodular opacities within RU and RLL, likely inflammatory in etiology Findings - stable 3mm RML pulm nodules stabel from  CT, cw benigh etiology  EGD 6 mos ago, [facility], by son's rpt no EV  EGD 3/22/23 there were 3 columns of small esophageal varices and varices in the lower third of the esophagus. portal hypertensive gastropathy was found in the gastric body gastritis (inflammation) was found in the gastric body and gastric antrum, multiple biopsies obtained in the gastric body and gastric antrum. mound suspicious for pancreatic nest v healing ulcer posterior wall of antru, mult biopsies obtained normal duodenal bulb and second portion.  cold forcep bx's of second portion retroflexed views in the stomach revealed no abnormalities.  COLON 3/22/23   prep excellent     6 mos ago good prep, polyps TA, AVM, Internal hem and external hem Two 5-9 mm polyps were found in the ascending colon; polypectomies were performed with a cold snare. Small angiodysplastic lesion in the sigmoid colon, transverse colon and the hepatic flexure The mucosa of the Terminal Ileum appeared normal. retroflexed views revealed internal hemorrhoid. retroflexed views revealed small internal hemorrhoid(s).  PATH Duodenum - mucosa w intact villous architecture, no significant pathologogic abnormalities identified Stomach, antrum, chronic gastritis, inactive, HP neg Stomach, posterior antrum, antral type mucosa w chronic gastritis, inactive, HP neg, panc tissue not identified (multiple levels examined) Stomach, body, chronic gastritis, inactive, HP neg Colon, Right, polypectomy, TA Colon Distal, Right polypectomy, TA  INTERVAL HX: feels good denies abdl pain, NVD, CP, SOB, f/c appetite good BM 2x/day, no rectal blood walks 2 miles/daily

## 2024-05-03 ENCOUNTER — NON-APPOINTMENT (OUTPATIENT)
Age: 74
End: 2024-05-03

## 2024-05-06 ENCOUNTER — NON-APPOINTMENT (OUTPATIENT)
Age: 74
End: 2024-05-06

## 2024-05-07 DIAGNOSIS — I25.10 ATHEROSCLEROTIC HEART DISEASE OF NATIVE CORONARY ARTERY WITHOUT ANGINA PECTORIS: ICD-10-CM

## 2024-06-17 ENCOUNTER — APPOINTMENT (OUTPATIENT)
Dept: HEPATOLOGY | Facility: CLINIC | Age: 74
End: 2024-06-17
Payer: MEDICARE

## 2024-06-17 ENCOUNTER — LABORATORY RESULT (OUTPATIENT)
Age: 74
End: 2024-06-17

## 2024-06-17 VITALS
SYSTOLIC BLOOD PRESSURE: 162 MMHG | HEART RATE: 75 BPM | BODY MASS INDEX: 28.84 KG/M2 | OXYGEN SATURATION: 97 % | DIASTOLIC BLOOD PRESSURE: 75 MMHG | RESPIRATION RATE: 16 BRPM | TEMPERATURE: 97.5 F | WEIGHT: 206 LBS | HEIGHT: 71 IN

## 2024-06-17 DIAGNOSIS — K74.60 UNSPECIFIED CIRRHOSIS OF LIVER: ICD-10-CM

## 2024-06-17 DIAGNOSIS — C22.0 LIVER CELL CARCINOMA: ICD-10-CM

## 2024-06-17 PROCEDURE — 99214 OFFICE O/P EST MOD 30 MIN: CPT

## 2024-06-23 LAB
ALBUMIN SERPL ELPH-MCNC: 4 G/DL
ALP BLD-CCNC: 546 U/L
ALPHA-1-FETOPROTEIN-L3: NORMAL %
ALPHA-1-FETOPROTEIN: 2.6 NG/ML
ALT SERPL-CCNC: 375 U/L
ANION GAP SERPL CALC-SCNC: 11 MMOL/L
AST SERPL-CCNC: 353 U/L
BASOPHILS # BLD AUTO: 0.04 K/UL
BASOPHILS NFR BLD AUTO: 1 %
BILIRUB SERPL-MCNC: 6.5 MG/DL
BUN SERPL-MCNC: 13 MG/DL
CALCIUM SERPL-MCNC: 8.6 MG/DL
CHLORIDE SERPL-SCNC: 103 MMOL/L
CO2 SERPL-SCNC: 25 MMOL/L
CREAT SERPL-MCNC: 0.6 MG/DL
EGFR: 102 ML/MIN/1.73M2
EOSINOPHIL # BLD AUTO: 0.08 K/UL
EOSINOPHIL NFR BLD AUTO: 1.9 %
GLUCOSE SERPL-MCNC: 177 MG/DL
HCT VFR BLD CALC: 44.5 %
HGB BLD-MCNC: 13.3 G/DL
IMM GRANULOCYTES NFR BLD AUTO: 0.2 %
INR PPP: 1.19 RATIO
LYMPHOCYTES # BLD AUTO: 0.53 K/UL
LYMPHOCYTES NFR BLD AUTO: 12.6 %
MAN DIFF?: NORMAL
MCHC RBC-ENTMCNC: 24.6 PG
MCHC RBC-ENTMCNC: 29.9 GM/DL
MCV RBC AUTO: 82.3 FL
MONOCYTES # BLD AUTO: 0.4 K/UL
MONOCYTES NFR BLD AUTO: 9.5 %
NEUTROPHILS # BLD AUTO: 3.13 K/UL
NEUTROPHILS NFR BLD AUTO: 74.8 %
PLATELET # BLD AUTO: 41 K/UL
POTASSIUM SERPL-SCNC: 4.1 MMOL/L
PROT SERPL-MCNC: 6.3 G/DL
PT BLD: 13.4 SEC
RBC # BLD: 5.41 M/UL
RBC # FLD: 27.2 %
SODIUM SERPL-SCNC: 140 MMOL/L
WBC # FLD AUTO: 4.19 K/UL

## 2024-06-23 NOTE — ASSESSMENT
[FreeTextEntry1] : 74yo M, retired urologist w DM, CAD and likely GONZALES cirr w portal htn (splenomegaly, recanalized paraumbilical vein, paraoesophageal and tera splenic varices) and HCC with 2 lesions (1.8 cm) seg 5 LR-5 and a (3-4 cm) seg 8 LR 4 HCC s/p Y90 Sept 2023 with favorable treatment response, lesions nonviable, listed for OLTx May, 2024. recent rise in bilirubin, was in afib, gave lovenox, and anemic required iron infusions  # GONZALES cirrhosis/ HCC MRI 4/2024 - s/p Y90 Sept 2023 - both lesions treated, nonviable, no new hepatic lesions DUE, scd July 2 Lesion #1 seg 5 (1.8 x 1.5), stable LR-TR nonviable Lesion # 2 Seg 4a/8 (1.1 x 1.0 cm) LR-TR nonviable AFP 3.9 (April 2024) no extrahep mets per CT 9/11/2023, DUE annually  # Portal HTN small EV, PHG per EGD 3/22/23 (never UGIB?) HE, controlled on lactulose and Rifax,does not like taste of lactulose, advised can take Miralax instead Ascites, None  # DM continue Jardiance, and recs per endo A1C 6  # Transplant status listed for OLTx 5/03/2024  MELD 10 [ABO A-] HCC exception points to be obtained in November, 2024. pt will accept HCV donors  # HCM COLONOSCOPY 3/22/23 Two 5-9 mm TA in ascending colon  RTC 2 weeks labs today

## 2024-06-23 NOTE — END OF VISIT
[FreeTextEntry3] : Attending note  I have seen and examined patient at bedside. I agree with hx, ROS, physical examination, imp/suggestions as written by Ms. Nury Siddiqi NP. Please see my note.  Clinically stable Had A-Fib but no rapid rate according to patient  Received iron infusion  Liver tests are abnormal, out of proportion of his baseline. Not sure the exact etiology behind it  No supplements  No alcohol  Will expedite MRI and repeat labs next week   [Time Spent: ___ minutes] : I have spent [unfilled] minutes of time on the encounter.

## 2024-06-23 NOTE — HISTORY OF PRESENT ILLNESS
Strong peripheral pulses/Capillary refill less/equal to 2 seconds [Not Working] : Not working [90: Able to carry normal activity; minor signs or symptoms of disease.] : 90: Able to carry normal activity; minor signs or symptoms of disease.  [TextBox_42] : 74yo M, retired urologist w hx DM, HTN, pAfib s/p ablation  (no AC 2/2 thrombocytopenia), CAD (s/p c cath 10/2023 = 2v CAD : dLcx 70% and mRCA 70%, nl LM and LAD, for medical mgmt), depression, anxiety, BPH, stable small lung nodule (since ) and cryptogenic, likely GONZALES liver cirrhosis with portal htn (splenomegaly, recanalized paraumbilical vein, paraoesophageal and tera splenic varices), and with HCC found on 23 MRI, cw a (1.8 cm) seg 5 LR-5 HCC and a (3-4 cm) seg 8 LR 4 HCC. Pt underwent Y90 2023 with favorable treatment response per  imaging -2 Treated hepatic lesions, both LR-TR nonviable.  No extrahepatic mets. AFP 3.9 (24)  Pt completed OLTx evaluation and listed for OLTx 2024  MELD 10 [ABO A-] HCC exception points will be obtained in .  BACKGROUND diagnosed with DM , started on Metformin, then started insulin  (a1c6 from 3/5), stopped insulin and now on jardiance monotherapy, A1c 6.2 elevated LFTs 10 years ago, was taking antifungal med (Lamisil) for toenails, noted elev LFTs, that never came down Subsequently developed cirrhosis diagnosed as cryptogenic. Liver biopsy 5 yrs ago  at [facility], read per pt son poss small duct PSC and another second read as inflam, ultimately cryptogenic Eventually developed portal hypertension  with splenomegaly and varices on imaging. Aug, 2023, Screening MRI demonstrated a 3-4 cm LR 4 4a HCC and a 2 cm LI RADS 5 HCC in segment 5. s/p Y90 2023 - [facility] Dr Palacios 10/2023 - Card Cath 2 vessel CAD: dLcx 70% and mRCA 70%, screening workup for clearance In liver transplant evaluation since 2024 Bilirubin 1.5 Albumin 3.6 INR 1.2 AFP 13.3  PAST SURGICAL HISTORY: Lap cholecystectomy 2015 Lumbar L4-L5 laminectomy.2016 cervical C3-4 laminectomy  tonsillectomy as child  SOCIAL HX TOBACCO - hookah once/month on occasion, never cigarettes. ETOH - 1-2 beer a month on occasion, none since Aug, 2023 ILLICITS - denies.  - 6 children covid vax x4 (Pfizer) covid infection  - fever/chills - pre vax  FAM HX no liver disease Mother, DM,  (from Covid) age 92 no heart dis in family no GI cancer in family or other cancers  ALLERGIES NKA  MEDICATION metoprolol 100mg daily (did not tolerate nadolol, syncope, dizziness, fatigue) folic acid 1 mg daily Xanax 0.5 mg 1 tab PRN Zoloft 100 mg daily Jardiance 10mg po (was on insulin 6-7 mos, didn't work) Rapaflo 8 mg daily Ozempic 2 mg/1.5 mL (0.25 mg or 0.5 mg dose, 0.5 mg sq weekly (no longer on, took a month, had GI symptoms, had pancreatitis, stopped 6 mos ago lactulose Rifax 550mg bid  LABS 24 Hg 11.7 PLT 50 Na 139 K 4.0 SCr 0.57 ALB 4 TB 1.8  AST 92 INR 1.23  A1C 5.7  STUDIES MRCP 4/3/24: 1.  Cirrhosis with portal hypertension. No new hepatic lesions. 2.  2 Treated hepatic lesions, both LR-TR nonviable  INTERVAL HX: 2024  He had few days of A-fib but no RVR He had two transfusions of iron  He did labs and TB was 5 and ALT and AST to the range of about 150-200 and

## 2024-06-24 ENCOUNTER — OUTPATIENT (OUTPATIENT)
Dept: OUTPATIENT SERVICES | Facility: HOSPITAL | Age: 74
LOS: 1 days | End: 2024-06-24
Payer: COMMERCIAL

## 2024-06-24 ENCOUNTER — RESULT REVIEW (OUTPATIENT)
Age: 74
End: 2024-06-24

## 2024-06-24 ENCOUNTER — APPOINTMENT (OUTPATIENT)
Dept: MRI IMAGING | Facility: HOSPITAL | Age: 74
End: 2024-06-24

## 2024-06-24 DIAGNOSIS — Z98.890 OTHER SPECIFIED POSTPROCEDURAL STATES: Chronic | ICD-10-CM

## 2024-06-24 DIAGNOSIS — Z90.49 ACQUIRED ABSENCE OF OTHER SPECIFIED PARTS OF DIGESTIVE TRACT: Chronic | ICD-10-CM

## 2024-06-24 PROCEDURE — 74183 MRI ABD W/O CNTR FLWD CNTR: CPT

## 2024-06-24 PROCEDURE — 74183 MRI ABD W/O CNTR FLWD CNTR: CPT | Mod: 26

## 2024-06-27 ENCOUNTER — TRANSCRIPTION ENCOUNTER (OUTPATIENT)
Age: 74
End: 2024-06-27

## 2024-07-03 ENCOUNTER — APPOINTMENT (OUTPATIENT)
Dept: INTERVENTIONAL RADIOLOGY/VASCULAR | Facility: HOSPITAL | Age: 74
End: 2024-07-03

## 2024-07-08 ENCOUNTER — NON-APPOINTMENT (OUTPATIENT)
Age: 74
End: 2024-07-08

## 2024-07-09 ENCOUNTER — NON-APPOINTMENT (OUTPATIENT)
Age: 74
End: 2024-07-09

## 2024-07-10 PROBLEM — C22.0 HEPATOMA: Status: ACTIVE | Noted: 2024-07-10

## 2024-07-15 ENCOUNTER — APPOINTMENT (OUTPATIENT)
Dept: HEPATOLOGY | Facility: CLINIC | Age: 74
End: 2024-07-15

## 2024-07-15 VITALS
BODY MASS INDEX: 28.7 KG/M2 | OXYGEN SATURATION: 96 % | TEMPERATURE: 98.1 F | SYSTOLIC BLOOD PRESSURE: 145 MMHG | DIASTOLIC BLOOD PRESSURE: 72 MMHG | HEART RATE: 68 BPM | WEIGHT: 205 LBS | RESPIRATION RATE: 16 BRPM | HEIGHT: 71 IN

## 2024-07-15 DIAGNOSIS — E13.9 OTHER SPECIFIED DIABETES MELLITUS W/OUT COMPLICATIONS: ICD-10-CM

## 2024-07-15 DIAGNOSIS — K74.60 UNSPECIFIED CIRRHOSIS OF LIVER: ICD-10-CM

## 2024-07-15 PROCEDURE — 99214 OFFICE O/P EST MOD 30 MIN: CPT

## 2024-07-17 LAB
ALBUMIN SERPL ELPH-MCNC: 3.9 G/DL
ALP BLD-CCNC: 462 U/L
ALT SERPL-CCNC: 204 U/L
ANION GAP SERPL CALC-SCNC: 10 MMOL/L
AST SERPL-CCNC: 176 U/L
BILIRUB SERPL-MCNC: 3.8 MG/DL
BUN SERPL-MCNC: 22 MG/DL
CALCIUM SERPL-MCNC: 8.4 MG/DL
CHLORIDE SERPL-SCNC: 104 MMOL/L
CO2 SERPL-SCNC: 24 MMOL/L
CREAT SERPL-MCNC: 0.69 MG/DL
EGFR: 98 ML/MIN/1.73M2
ESTIMATED AVERAGE GLUCOSE: 111 MG/DL
GLUCOSE SERPL-MCNC: 177 MG/DL
HBA1C MFR BLD HPLC: 5.5 %
HCT VFR BLD CALC: 43.1 %
HGB BLD-MCNC: 13.2 G/DL
IGA SER QL IEP: 402 MG/DL
IGG SER QL IEP: 1002 MG/DL
IGG SER QL IEP: 1002 MG/DL
IGG1 SER-MCNC: 593 MG/DL
IGG2 SER-MCNC: 333 MG/DL
IGG3 SER-MCNC: 46.3 MG/DL
IGG4 SER-MCNC: 16.2 MG/DL
IGM SER QL IEP: 31 MG/DL
INR PPP: 1.16 RATIO
MCHC RBC-ENTMCNC: 26 PG
MCHC RBC-ENTMCNC: 30.6 GM/DL
MCV RBC AUTO: 84.8 FL
PLATELET # BLD AUTO: 46 K/UL
POTASSIUM SERPL-SCNC: 4 MMOL/L
PROT SERPL-MCNC: 6.2 G/DL
PT BLD: 13.1 SEC
RBC # BLD: 5.08 M/UL
RBC # FLD: 25.7 %
SODIUM SERPL-SCNC: 139 MMOL/L
WBC # FLD AUTO: 4 K/UL

## 2024-07-22 LAB
ALPHA-1-FETOPROTEIN-L3: NORMAL %
ALPHA-1-FETOPROTEIN: 2.9 NG/ML

## 2024-07-24 LAB — ACARBOXYPROTHROMBIN SERPL-MCNC: 3.1 NG/ML

## 2024-08-27 ENCOUNTER — APPOINTMENT (OUTPATIENT)
Dept: HEPATOLOGY | Facility: CLINIC | Age: 74
End: 2024-08-27

## 2024-09-13 ENCOUNTER — NON-APPOINTMENT (OUTPATIENT)
Age: 74
End: 2024-09-13

## 2024-09-20 ENCOUNTER — APPOINTMENT (OUTPATIENT)
Dept: MRI IMAGING | Facility: HOSPITAL | Age: 74
End: 2024-09-20

## 2024-09-20 ENCOUNTER — OUTPATIENT (OUTPATIENT)
Dept: OUTPATIENT SERVICES | Facility: HOSPITAL | Age: 74
LOS: 1 days | End: 2024-09-20
Payer: MEDICARE

## 2024-09-20 ENCOUNTER — RESULT REVIEW (OUTPATIENT)
Age: 74
End: 2024-09-20

## 2024-09-20 DIAGNOSIS — Z90.49 ACQUIRED ABSENCE OF OTHER SPECIFIED PARTS OF DIGESTIVE TRACT: Chronic | ICD-10-CM

## 2024-09-20 DIAGNOSIS — Z98.890 OTHER SPECIFIED POSTPROCEDURAL STATES: Chronic | ICD-10-CM

## 2024-09-20 PROCEDURE — 74183 MRI ABD W/O CNTR FLWD CNTR: CPT | Mod: 26,MD

## 2024-09-20 PROCEDURE — 74183 MRI ABD W/O CNTR FLWD CNTR: CPT | Mod: MD

## 2024-09-20 PROCEDURE — A9585: CPT

## 2024-09-24 ENCOUNTER — APPOINTMENT (OUTPATIENT)
Dept: HEPATOLOGY | Facility: CLINIC | Age: 74
End: 2024-09-24
Payer: MEDICARE

## 2024-09-24 VITALS
HEART RATE: 78 BPM | SYSTOLIC BLOOD PRESSURE: 147 MMHG | HEIGHT: 71 IN | DIASTOLIC BLOOD PRESSURE: 72 MMHG | WEIGHT: 216 LBS | BODY MASS INDEX: 30.24 KG/M2

## 2024-09-24 DIAGNOSIS — K74.60 UNSPECIFIED CIRRHOSIS OF LIVER: ICD-10-CM

## 2024-09-24 DIAGNOSIS — C22.0 LIVER CELL CARCINOMA: ICD-10-CM

## 2024-09-24 DIAGNOSIS — I10 ESSENTIAL (PRIMARY) HYPERTENSION: ICD-10-CM

## 2024-09-24 DIAGNOSIS — E13.9 OTHER SPECIFIED DIABETES MELLITUS W/OUT COMPLICATIONS: ICD-10-CM

## 2024-09-24 PROCEDURE — 99214 OFFICE O/P EST MOD 30 MIN: CPT

## 2024-09-24 RX ORDER — BUPROPION HYDROCHLORIDE 150 MG/1
150 TABLET, FILM COATED ORAL
Refills: 0 | Status: ACTIVE | COMMUNITY

## 2024-09-24 NOTE — ASSESSMENT
[FreeTextEntry1] : He had a recent MRI which showed possibility of some tumor activity in one of the ablation zones.  This has made him very nervous and he increase the amount of Xanax he takes at night to sleep.  I advised him and his family about the potential risks associated with Xanax and risk of hepatic encephalopathy. Labs to be done today HCC status post Y90.  Case will be discussed at the tumor board this week decide on the further treatment.  This could be another attempt of Y90 or bland embolization.  He have to be cautious about his overall liver function considering bilirubin of 4. Monitor labs for any worsening of anemia Patient is listed for liver transplant.  Transplant education was done Living donor transplant was discussed in details case was discussed with IR

## 2024-09-24 NOTE — HISTORY OF PRESENT ILLNESS
[de-identified] : Gastrointestinal HPI: Karnofsky Performance Scale Index: 90: Able to carry normal activity; minor signs or symptoms of disease. 72yo M, retired urologist w hx DM, HTN, pAfib s/p ablation  (no AC 2/2 thrombocytopenia), CAD (s/p c cath 10/2023 = 2v CAD : dLcx 70% and mRCA 70%, nl LM and LAD, for medical mgmt), depression, anxiety, BPH, stable small lung nodule (since ) and cryptogenic, likely GONZALES liver cirrhosis with portal htn (splenomegaly, recanalized paraumbilical vein, paraoesophageal and tera splenic varices), and with HCC found on 23 MRI, cw a (1.8 cm) seg 5 LR-5 HCC and a (3-4 cm) seg 8 LR 4 HCC. Pt underwent Y90 2023 with favorable treatment response per  imaging -2 Treated hepatic lesions, both LR-TR nonviable. No extrahepatic mets. AFP 3.9 (24)  Pt completed OLTx evaluation and listed for OLTx 2024 MELD 10 [ABO A-] HCC exception points will be obtained in .  BACKGROUND diagnosed with DM , started on Metformin, then started insulin  (a1c6 from 3/5), stopped insulin and now on jardiance monotherapy, A1c 6.2 elevated LFTs 10 years ago, was taking antifungal med (Lamisil) for toenails, noted elev LFTs, that never came down Subsequently developed cirrhosis diagnosed as cryptogenic. Liver biopsy 5 yrs ago  at [facility], read per pt son poss small duct PSC and another second read as inflam, ultimately cryptogenic Eventually developed portal hypertension  with splenomegaly and varices on imaging. Aug, 2023, Screening MRI demonstrated a 3-4 cm LR 4 4a HCC and a 2 cm LI RADS 5 HCC in segment 5. s/p Y90 2023 - [facility] Dr Palacios 10/2023 - Card Cath 2 vessel CAD: dLcx 70% and mRCA 70%, screening workup for clearance In liver transplant evaluation since 2024 Bilirubin 1.5 Albumin 3.6 INR 1.2 AFP 13.3  PAST SURGICAL HISTORY: Lap cholecystectomy 2015 Lumbar L4-L5 laminectomy.2016 cervical C3-4 laminectomy  tonsillectomy as child  SOCIAL HX TOBACCO - hookah once/month on occasion, never cigarettes. ETOH - 1-2 beer a month on occasion, none since Aug, 2023 ILLICITS - denies.  - 6 children covid vax x4 (Pfizer) covid infection  - fever/chills - pre vax  FAM HX no liver disease Mother, DM,  (from Covid) age 92 no heart dis in family no GI cancer in family or other cancers  ALLERGIES NKA  MEDICATION metoprolol 100mg daily (did not tolerate nadolol, syncope, dizziness, fatigue) folic acid 1 mg daily Xanax 0.5 mg 1 tab PRN Zoloft 100 mg daily Jardiance 10mg po (was on insulin 6-7 mos, didn't work) Rapaflo 8 mg daily Ozempic 2 mg/1.5 mL (0.25 mg or 0.5 mg dose, 0.5 mg sq weekly (no longer on, took a month, had GI symptoms, had pancreatitis, stopped 6 mos ago lactulose Rifax 550mg bid

## 2024-09-26 ENCOUNTER — NON-APPOINTMENT (OUTPATIENT)
Age: 74
End: 2024-09-26

## 2024-10-03 LAB
ALBUMIN SERPL ELPH-MCNC: 3.9 G/DL
ALP BLD-CCNC: 283 U/L
ALT SERPL-CCNC: 84 U/L
ANION GAP SERPL CALC-SCNC: 12 MMOL/L
AST SERPL-CCNC: 59 U/L
BILIRUB SERPL-MCNC: 2.7 MG/DL
BUN SERPL-MCNC: 11 MG/DL
CALCIUM SERPL-MCNC: 8.4 MG/DL
CHLORIDE SERPL-SCNC: 100 MMOL/L
CO2 SERPL-SCNC: 23 MMOL/L
CREAT SERPL-MCNC: 0.61 MG/DL
EGFR: 101 ML/MIN/1.73M2
ESTIMATED AVERAGE GLUCOSE: 134 MG/DL
FERRITIN SERPL-MCNC: 47 NG/ML
GLUCOSE SERPL-MCNC: 118 MG/DL
HBA1C MFR BLD HPLC: 6.3 %
HCT VFR BLD CALC: 43.4 %
HGB BLD-MCNC: 13.3 G/DL
INR PPP: 1.22 RATIO
IRON SATN MFR SERPL: 20 %
IRON SERPL-MCNC: 80 UG/DL
MCHC RBC-ENTMCNC: 26.8 PG
MCHC RBC-ENTMCNC: 30.6 GM/DL
MCV RBC AUTO: 87.5 FL
PLATELET # BLD AUTO: 44 K/UL
POTASSIUM SERPL-SCNC: 4 MMOL/L
PROT SERPL-MCNC: 6.2 G/DL
PT BLD: 14.5 SEC
RBC # BLD: 4.96 M/UL
RBC # FLD: 18.3 %
SODIUM SERPL-SCNC: 136 MMOL/L
TIBC SERPL-MCNC: 398 UG/DL
UIBC SERPL-MCNC: 318 UG/DL
WBC # FLD AUTO: 5.46 K/UL

## 2024-10-05 LAB
PETH 16:0/18:1: NEGATIVE NG/ML
PETH 16:0/18:2: NEGATIVE NG/ML
PETH COMMENTS: NORMAL

## 2024-10-09 ENCOUNTER — RESULT REVIEW (OUTPATIENT)
Age: 74
End: 2024-10-09

## 2024-10-09 ENCOUNTER — OUTPATIENT (OUTPATIENT)
Dept: OUTPATIENT SERVICES | Facility: HOSPITAL | Age: 74
LOS: 1 days | End: 2024-10-09
Payer: MEDICARE

## 2024-10-09 ENCOUNTER — APPOINTMENT (OUTPATIENT)
Dept: INTERVENTIONAL RADIOLOGY/VASCULAR | Facility: HOSPITAL | Age: 74
End: 2024-10-09

## 2024-10-09 DIAGNOSIS — Z98.890 OTHER SPECIFIED POSTPROCEDURAL STATES: Chronic | ICD-10-CM

## 2024-10-09 DIAGNOSIS — Z90.49 ACQUIRED ABSENCE OF OTHER SPECIFIED PARTS OF DIGESTIVE TRACT: Chronic | ICD-10-CM

## 2024-10-09 LAB
ALBUMIN SERPL ELPH-MCNC: 4.3 G/DL — SIGNIFICANT CHANGE UP (ref 3.3–5)
ALP SERPL-CCNC: 233 U/L — HIGH (ref 40–120)
ALT FLD-CCNC: 72 U/L — HIGH (ref 10–45)
ANION GAP SERPL CALC-SCNC: 8 MMOL/L — SIGNIFICANT CHANGE UP (ref 5–17)
AST SERPL-CCNC: 59 U/L — HIGH (ref 10–40)
BILIRUB SERPL-MCNC: 2.8 MG/DL — HIGH (ref 0.2–1.2)
BUN SERPL-MCNC: 19 MG/DL — SIGNIFICANT CHANGE UP (ref 7–23)
CALCIUM SERPL-MCNC: 8.3 MG/DL — LOW (ref 8.4–10.5)
CHLORIDE SERPL-SCNC: 107 MMOL/L — SIGNIFICANT CHANGE UP (ref 96–108)
CO2 SERPL-SCNC: 26 MMOL/L — SIGNIFICANT CHANGE UP (ref 22–31)
CREAT SERPL-MCNC: 0.63 MG/DL — SIGNIFICANT CHANGE UP (ref 0.5–1.3)
EGFR: 100 ML/MIN/1.73M2 — SIGNIFICANT CHANGE UP
GLUCOSE BLDC GLUCOMTR-MCNC: 147 MG/DL — HIGH (ref 70–99)
GLUCOSE SERPL-MCNC: 157 MG/DL — HIGH (ref 70–99)
HCT VFR BLD CALC: 37.6 % — LOW (ref 39–50)
HGB BLD-MCNC: 11.5 G/DL — LOW (ref 13–17)
MCHC RBC-ENTMCNC: 26.8 PG — LOW (ref 27–34)
MCHC RBC-ENTMCNC: 30.6 GM/DL — LOW (ref 32–36)
MCV RBC AUTO: 87.6 FL — SIGNIFICANT CHANGE UP (ref 80–100)
NRBC # BLD: 0 /100 WBCS — SIGNIFICANT CHANGE UP (ref 0–0)
PLATELET # BLD AUTO: 50 K/UL — LOW (ref 150–400)
POTASSIUM SERPL-MCNC: 4.1 MMOL/L — SIGNIFICANT CHANGE UP (ref 3.5–5.3)
POTASSIUM SERPL-SCNC: 4.1 MMOL/L — SIGNIFICANT CHANGE UP (ref 3.5–5.3)
PROT SERPL-MCNC: 6.6 G/DL — SIGNIFICANT CHANGE UP (ref 6–8.3)
RBC # BLD: 4.29 M/UL — SIGNIFICANT CHANGE UP (ref 4.2–5.8)
RBC # FLD: 18.5 % — HIGH (ref 10.3–14.5)
SODIUM SERPL-SCNC: 141 MMOL/L — SIGNIFICANT CHANGE UP (ref 135–145)
WBC # BLD: 4.01 K/UL — SIGNIFICANT CHANGE UP (ref 3.8–10.5)
WBC # FLD AUTO: 4.01 K/UL — SIGNIFICANT CHANGE UP (ref 3.8–10.5)

## 2024-10-09 PROCEDURE — 36248 INS CATH ABD/L-EXT ART ADDL: CPT

## 2024-10-09 PROCEDURE — 77263 THER RADIOLOGY TX PLNG CPLX: CPT

## 2024-10-09 PROCEDURE — 77300 RADIATION THERAPY DOSE PLAN: CPT | Mod: 26

## 2024-10-09 PROCEDURE — 76380 CAT SCAN FOLLOW-UP STUDY: CPT | Mod: 26,MH,59

## 2024-10-09 PROCEDURE — 36247 INS CATH ABD/L-EXT ART 3RD: CPT | Mod: RT

## 2024-10-09 PROCEDURE — 75726 ARTERY X-RAYS ABDOMEN: CPT | Mod: 26

## 2024-10-09 PROCEDURE — 78830 RP LOCLZJ TUM SPECT W/CT 1: CPT | Mod: 26,MC

## 2024-10-09 PROCEDURE — 75774 ARTERY X-RAY EACH VESSEL: CPT | Mod: 26

## 2024-10-09 NOTE — PRE-ANESTHESIA EVALUATION ADULT - NSANTHPEFT_GEN_ALL_CORE
General: A&Ox4 in NAD with pleasant demeanor  CV: RRR S1/S2 intact  Resp: B/L breath sounds intact, breathing comfortably

## 2024-10-09 NOTE — PRE-ANESTHESIA EVALUATION ADULT - NSPROPOSEDPROCEDFT_GEN_ALL_CORE
Y90 mapping Airway patent, TM normal bilaterally, normal appearing mouth, nose, throat, neck supple with full range of motion, no cervical adenopathy.

## 2024-10-09 NOTE — PRE-ANESTHESIA EVALUATION ADULT - NSANTHALCOHOLSD_GEN_ALL_CORE
Yes Post-Care Instructions: I reviewed with the patient in detail post-care instructions. Patient is to keep the biopsy site dry overnight, and then apply bacitracin twice daily until healed. Patient may apply hydrogen peroxide soaks to remove any crusting. Suture removal in one week.

## 2024-10-16 ENCOUNTER — APPOINTMENT (OUTPATIENT)
Dept: INTERVENTIONAL RADIOLOGY/VASCULAR | Facility: HOSPITAL | Age: 74
End: 2024-10-16

## 2024-10-16 ENCOUNTER — RESULT REVIEW (OUTPATIENT)
Age: 74
End: 2024-10-16

## 2024-10-16 ENCOUNTER — OUTPATIENT (OUTPATIENT)
Dept: OUTPATIENT SERVICES | Facility: HOSPITAL | Age: 74
LOS: 1 days | End: 2024-10-16
Payer: MEDICARE

## 2024-10-16 DIAGNOSIS — Z90.49 ACQUIRED ABSENCE OF OTHER SPECIFIED PARTS OF DIGESTIVE TRACT: Chronic | ICD-10-CM

## 2024-10-16 DIAGNOSIS — Z98.890 OTHER SPECIFIED POSTPROCEDURAL STATES: Chronic | ICD-10-CM

## 2024-10-16 LAB
ANISOCYTOSIS BLD QL: SLIGHT — SIGNIFICANT CHANGE UP
HCT VFR BLD CALC: 40.8 % — SIGNIFICANT CHANGE UP (ref 39–50)
HGB BLD-MCNC: 12.2 G/DL — LOW (ref 13–17)
MACROCYTES BLD QL: SLIGHT — SIGNIFICANT CHANGE UP
MANUAL SMEAR VERIFICATION: SIGNIFICANT CHANGE UP
MCHC RBC-ENTMCNC: 25.6 PG — LOW (ref 27–34)
MCHC RBC-ENTMCNC: 29.9 GM/DL — LOW (ref 32–36)
MCV RBC AUTO: 85.5 FL — SIGNIFICANT CHANGE UP (ref 80–100)
NRBC # BLD: 0 /100 WBCS — SIGNIFICANT CHANGE UP (ref 0–0)
OVALOCYTES BLD QL SMEAR: SLIGHT — SIGNIFICANT CHANGE UP
PLAT MORPH BLD: ABNORMAL
PLATELET # BLD AUTO: 47 K/UL — LOW (ref 150–400)
POLYCHROMASIA BLD QL SMEAR: SLIGHT — SIGNIFICANT CHANGE UP
RBC # BLD: 4.77 M/UL — SIGNIFICANT CHANGE UP (ref 4.2–5.8)
RBC # FLD: 18.3 % — HIGH (ref 10.3–14.5)
RBC BLD AUTO: ABNORMAL
SPHEROCYTES BLD QL SMEAR: SLIGHT — SIGNIFICANT CHANGE UP
WBC # BLD: 4.77 K/UL — SIGNIFICANT CHANGE UP (ref 3.8–10.5)
WBC # FLD AUTO: 4.77 K/UL — SIGNIFICANT CHANGE UP (ref 3.8–10.5)

## 2024-10-16 PROCEDURE — 79445 NUCLEAR RX INTRA-ARTERIAL: CPT | Mod: 26

## 2024-10-16 PROCEDURE — 37243 VASC EMBOLIZE/OCCLUDE ORGAN: CPT

## 2024-10-16 PROCEDURE — 36247 INS CATH ABD/L-EXT ART 3RD: CPT | Mod: RT

## 2024-10-17 ENCOUNTER — RESULT REVIEW (OUTPATIENT)
Age: 74
End: 2024-10-17

## 2024-10-17 PROCEDURE — 78830 RP LOCLZJ TUM SPECT W/CT 1: CPT | Mod: 26,MH

## 2024-10-28 ENCOUNTER — NON-APPOINTMENT (OUTPATIENT)
Age: 74
End: 2024-10-28

## 2024-10-28 DIAGNOSIS — C22.0 LIVER CELL CARCINOMA: ICD-10-CM

## 2024-11-01 ENCOUNTER — APPOINTMENT (OUTPATIENT)
Dept: INTERVENTIONAL RADIOLOGY/VASCULAR | Facility: HOSPITAL | Age: 74
End: 2024-11-01

## 2024-11-05 ENCOUNTER — APPOINTMENT (OUTPATIENT)
Dept: HEPATOLOGY | Facility: CLINIC | Age: 74
End: 2024-11-05

## 2024-11-14 NOTE — PATIENT PROFILE ADULT - NSPROHMDIABETMGMTSTRAT_GEN_A_NUR
Detail Level: Detailed
Quality 431: Preventive Care And Screening: Unhealthy Alcohol Use - Screening: Patient not identified as an unhealthy alcohol user when screened for unhealthy alcohol use using a systematic screening method
Quality 226: Preventive Care And Screening: Tobacco Use: Screening And Cessation Intervention: Patient screened for tobacco use and is an ex/non-smoker
activity/adequate rest/blood glucose testing/coping strategies/diet modification/exercise/insulin therapy/medication therapy/routine screenings/weight management

## 2024-11-14 NOTE — PATIENT PROFILE ADULT - FALL HARM RISK - HARM RISK INTERVENTIONS
Assistance with ambulation/Assistance OOB with selected safe patient handling equipment/Communicate Risk of Fall with Harm to all staff/Discuss with provider need for PT consult/Monitor for mental status changes/Monitor gait and stability/Provide patient with walking aids - walker, cane, crutches/Reinforce activity limits and safety measures with patient and family/Reorient to person, place and time as needed/Review medications for side effects contributing to fall risk/Sit up slowly, dangle for a short time, stand at bedside before walking/Tailored Fall Risk Interventions/Toileting schedule using arm’s reach rule for commode and bathroom/Use of alarms - bed, chair and/or voice tab/Visual Cue: Yellow wristband and red socks/Bed in lowest position, wheels locked, appropriate side rails in place/Call bell, personal items and telephone in reach/Instruct patient to call for assistance before getting out of bed or chair/Non-slip footwear when patient is out of bed/[place] to call system/Physically safe environment - no spills, clutter or unnecessary equipment/Purposeful Proactive Rounding/Room/bathroom lighting operational, light cord in reach Assistance OOB with selected safe patient handling equipment/Communicate Risk of Fall with Harm to all staff/Discuss with provider need for PT consult/Monitor gait and stability/Provide patient with walking aids - walker, cane, crutches/Reinforce activity limits and safety measures with patient and family/Tailored Fall Risk Interventions/Visual Cue: Yellow wristband and red socks/Bed in lowest position, wheels locked, appropriate side rails in place/Call bell, personal items and telephone in reach/Instruct patient to call for assistance before getting out of bed or chair/Non-slip footwear when patient is out of bed/[place] to call system/Physically safe environment - no spills, clutter or unnecessary equipment/Purposeful Proactive Rounding/Room/bathroom lighting operational, light cord in reach

## 2024-11-15 ENCOUNTER — RESULT REVIEW (OUTPATIENT)
Age: 74
End: 2024-11-15

## 2024-11-15 ENCOUNTER — APPOINTMENT (OUTPATIENT)
Dept: TRANSPLANT | Facility: HOSPITAL | Age: 74
End: 2024-11-15

## 2024-11-15 NOTE — H&P ADULT - HISTORY OF PRESENT ILLNESS
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 Pt underwent Y90 Sept, 2023 with favorable treatment response.Pt completed OLTx evaluation and listed for OLTx 5/03/2024

## 2024-11-15 NOTE — PRE-ANESTHESIA EVALUATION ADULT - NSANTHVITALSIGNSFT_GEN_ALL_CORE
PETRA,     Your procedure/Surgery is scheduled at [facility]:  [address] [place], [place] [address]  on 6-3__.         Your scheduled ARRIVAL time WILL BE GIVEN TO YOU FROM DR. NORTON'S OFFICE__, however if there are any changes to your procedure time,  you will be called with your new arrival time the day before your procedure.       Please do NOT follow the arrival time on your live well arely, it is incorrect and doing so may cause your procedure to be cancelled (we are working on the issue).    Free  service is available Monday through Friday starting at 8am until 3:30pm     Please bring your insurance card, 's license and a list of your medications, vitamins and supplements to the hospital, including the last dosage and time taken.    A surgical estimate may be prepared for your upcoming procedure.  If you have questions about the out of pocket amount due on the estimate,  you may contact the Patient Contact Center for assistance at [phone] -option #4 Estimates.     ON DAY OF PROCEDURE, CHECK IN AT:  Outpatient Care- located on the 1st floor. ([phone])        Diet:    DO NOT EAT AFTER MIDNIGHT PRIOR TO YOUR PROCEDURE.  This includes hard candy, gum and mints. Eating after midnight could cause a delay or cancellation of your procedure/surgery.  You may have only water to drink UP TO 4 hours prior to your ARRIVAL time. Sips of water with any medications you were instructed to take the day of procedure is allowed.         If you are Diabetic- Have a 30 gm carbohydrate PM snack the night prior to surgery, unless directed otherwise.          Medication Instructions:    Stop herbal supplements, Vitamin E (and products containing Vitamin E), multivitamins, prenatal vitamins, and omega fish oil 1 week prior to procedure.    Stop Chondroitin/glucosamine 2 days prior to procedure.    Do not consume any products containing cannabis/CBD/marijuana for 3 days prior to your procedure.     STOP  taking non-steroidal, anti-inflammatory drugs, otherwise known as NSAIDS per your surgeon's instructions.  Examples of NSAIDS are Aleve, ibuprofen, Motrin, Advil and Naproxen.          If you are Diabetic, on the day of your procedure before coming to the hospital, measure your blood sugar every 2 hours and call ____[phone] for outpatient care or [phone] for day surgery___the day of procedure if your blood sugar is greater than 160 or less than 100.    DO NOT TAKE MORNING OF PROCEDURE- BUMETANIDE, POTASSIUM CHLORIDE, AND INSULIN LISPRO.    HOLD MULTIVITAMINS ONE WEEK PRIOR TO PROCEDURE.       TAKE INSULIN GLARGINE (LANTUS) THE NIGHT BEFORE PROCEDURE, BUT ONLY TAKE 90% OF THE DOSE.  TAKE 9 UNITS,    TAKE INSULIN GLARGINE (LANTUS) THE MORNING OF PROCEDURE, BUT ONLY TAKE 50% OF DOSE.  TAKE 5 UNITS.         Take only the following medications before coming to the hospital. If they are pills, you may take them with a small sip of water: _TAMSULOSIN, LOSARTAN, GABAPENTIN._    You may also take the following medications if needed: _TYLENOL_.      If there are any changes in your physical condition, such as cold, fever or infection, or you have specific questions regarding your procedure/surgery, please contact your surgeon directly.    For questions regarding these instructions, contact Pre-Admission Testing at [phone], Monday through Friday, 8 am - 4 pm.    On the day of your procedure, please bring in a copy of your complete up-to-date medication list, I.D., and insurance card.      Important Information:    Hibiclens Highly Recommended    Please bathe or shower the evening before and morning of your procedure/surgery.  Avoid using lotions, creams, powders, make-up and deodorant on your clean skin.    Remove all body piercings, jewelry, earrings and contact lenses before coming to the hospital.    You may wear you glasses, hearing aids and/or dentures to the hospital. Please bring a case as they will need  to be removed prior to the procedure/surgery.    If you are a smoker, please DO NOT SMOKE FOR 12 HOURS PRIOR to your procedure/surgery (this includes cigarettes and vaping).     DO NOT SMOKE MARIJUANA the day of your procedure/surgery.       If your doctor mentioned that you might spend the night, please bring a small overnight bag with toiletries and any personal items you may need.  If you use a CPAP machine and will be spending the night, bring your machine, tubing and mask.    Due to anesthesia, you will not be able to operate power tools, drink alcohol, or drive a vehicle for 24 hours after surgery.  You will not be able to walk home, use public transportation or take a cab, Uber or Lyft home.        *If you are not being admitted after your procedure, you must have a responsible adult (18 or older) to drive you home and it is HIGHLY recommended that you have a responsible adult with you overnight at home following the procedure/surgery.*. IF YOU ARE UNABLE TO HAVE SOMEONE WITH YOU OVERNIGHT AFTER SURGERY, PLEASE NOTIFY YOUR SURGEON.         REQUIRED INSTRUCTIONS PRESURGERY SHOWER/BATH   DO NOT: shave any part of the surgical area for at least 2 days prior to surgery.   Tiny skin cuts and abrasions from shaving in the surgical area can increase risk of infection.   DO NOT use lotions on skin before surgery.   DO use a fresh clean towel after each shower.   DO dress in clean clothes after each of your showers.   DO shower or bathe your whole body, including hair, on the night before surgery.   DO shower again on the morning of surgery if time permits (do not wash hair during this shower)     GENERAL REQUIRED SHOWER/BATH INSTRUCTIONS (SOAP AND WATER)   1. Use warm, but not hot, water for shower/bath       NOTE: Showers are recommended. Showers are more effective than a bath for pre-operative skin cleaning   2. Use regular soap to remove dirt and germs from your skin   3. Do not scrub so vigorously that skin is  abraded or scratched   4. Use clean towels to gently dry your skin after each shower/bath          Instructions for Preoperative Skin Cleansing Before Planned Surgical Procedure at [facility]   For your safety and to help prevent infection, please follow this:    HIGHLY RECOMMENDED additional antiseptic shower/bath.   ANTISEPTIC SHOWER/BATH PROCESS     e.g. HIBICLENS* or alternative chlorhexidine (CHG) containing skin antisepsis solutions   Chlorhexidine gluconate (2% - 4%) skin cleansing solutions are wash solutions used to kill germs on the skin. They are available over the counter (no prescription needed) at minimal cost at most pharmacies and drug stores.   *WHEN USING HIBICLENS* MAY CAUSE SHOWER AND BATHTUB SURFACES TO BECOME SLIPPERY*   Instructions for \"whole body\" shower or bath with chlorhexidine gluconate (CHG) antiseptic solution   1. After your required shower or bath, rinse thoroughly.   2. Step away from the stream of water, and thoroughly apply enough solution to cover skin below the neck (not head or face), within skin folds, and in hard to reach areas (e.g. the back of the knees, inside elbows, etc). Use your hands to apply and rub onto your skin without using a washcloth. Note: Solution will NOT suds up when applying   3. Rinse thoroughly.   4. Dry off with a clean towel.   5. Dress in clean clothes.   ? DO NOT use on the head or face DO NOT use in the genital area   ? DO NOT use on non-healing wounds DO NOT use in eyes, ears or mouth.   Making sure that your skin is clean and ready for surgery at home is very important.   You can reduce the risk of infection by following the required instructions above.    vss

## 2024-11-15 NOTE — H&P ADULT - NSICDXPASTMEDICALHX_GEN_ALL_CORE_FT
PAST MEDICAL HISTORY:  Cirrhosis     DM (diabetes mellitus)     HCC (hepatocellular carcinoma)     History of BPH     HTN (hypertension)     Paroxysmal atrial fibrillation

## 2024-11-15 NOTE — H&P ADULT - ASSESSMENT
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 Pt underwent Y90 Sept, 2023 with favorable treatment response. Pt completed OLTx evaluation and listed for OLTx 5/03/2024     Plan  - Tentative OR 7AM  - Unasyn for OR  - preoperative labs  - [facility] labs  - [employer] COVID-19  - NPO after midnight

## 2024-11-15 NOTE — CONSULT NOTE ADULT - ASSESSMENT
ASSESSMENT:    PLAN:    Neuro:  - Assess neurological neurovascular status every  hours in the setting of  - Sedation while intubated with precedex  - Pain control: Dilaudid    Resp:  - Out of bed to chair, ambulate as tolerated, and incentive spirometry to prevent atelectasis  - [facility] 14/480/5/40    CV:  - goal MAP > 65 mmHg  - Levo  - wean as tolerated    GI:   - Diet: NPO  - Bowel regimen with senna & Miralax  - Protonix for stress ulcer prophylaxis    Renal:  - Monitor I&Os and electrolytes w/ repletions as necessary  - Castro  - Plasmalyte @ 125 cc/hr    Heme:  - Monitor CBC and coags  - Hold chemical VTE prophylaxis  - SCDs     ID:   - Monitor for clinical evidence of active infection  - Monitor WBC, temperature, and procalcitonin  - Unasyn q6 x 48 hours  - transplant ppx with fluconazole, bactrim, valcyte    Endo:   - Monitor glucose ; HgbA1C  - on insulin gtt intra-op, now dc'ed  - post transplant steroid taper    Lines:  - PIV  - L radial A-line  - R fem central line    Disposition:   ASSESSMENT:    PLAN:    Neuro:  - A&Ox4 at baseline  - Sedation while intubated with precedex  - Pain control: Dilaudid PRN    Resp:  - Out of bed to chair, ambulate as tolerated, and incentive spirometry to prevent atelectasis  - [facility] 14/480/5/40    CV:  - goal MAP > 65 mmHg  - not requiring pressors    GI:   - Diet: NPO  - NGT in place  - Senna  - Protonix for stress ulcer prophylaxis    Renal:  - Monitor I&Os and electrolytes w/ repletions as necessary  - Castro  - Plasmalyte @ 125 cc/hr    Heme:  - Monitor CBC and coags  - Hold chemical VTE prophylaxis  - SCDs     ID:   - Monitor for clinical evidence of active infection  - Monitor WBC, temperature, and procalcitonin  - Unasyn q6 x 48 hours  - transplant ppx with fluconazole, bactrim, valcyte    Endo:   - Monitor glucose  - insulin gtt started intra-op, titrate per protocol  - post transplant steroid taper    Lines:  - PIV  - L radial A-line  - R fem central line    Disposition: SICU   ASSESSMENT:  73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       PLAN:    Neuro:  - A&Ox4 at baseline  - Sedation while intubated with precedex  - Pain control: Dilaudid PRN    Resp:  - [facility] 14/480/5/40    CV:  - goal MAP > 65 mmHg  - not requiring pressors    GI:   - Diet: NPO  - NGT in place  - Senna  - Protonix for stress ulcer prophylaxis    Renal:  - Monitor I&Os and electrolytes w/ repletions as necessary  - Castro  - Plasmalyte @ 125 cc/hr    Heme:  - Monitor CBC and coags  - Hold chemical VTE prophylaxis  - SCDs     ID:   - Monitor for clinical evidence of active infection  - Monitor WBC, temperature, and procalcitonin  - Unasyn q6 x 48 hours  - transplant ppx with fluconazole, bactrim, valcyte    Endo:   - Monitor glucose  - insulin gtt started intra-op, titrate per protocol  - post transplant steroid taper    Lines:  - PIV  - L radial A-line  - R fem central line    Disposition: SICU

## 2024-11-15 NOTE — CONSULT NOTE ADULT - SUBJECTIVE AND OBJECTIVE BOX
HISTORY OF PRESENT ILLNESS:  STERLING BRYANT is a 74y Male     PAST MEDICAL HISTORY:   DM (diabetes mellitus)  HTN (hypertension)  Paroxysmal atrial fibrillation  Cirrhosis  HCC (hepatocellular carcinoma)  History of BPH        PAST SURGICAL HISTORY:     FAMILY HISTORY:     SOCIAL HISTORY:    CODE STATUS: full code    HOME MEDICATIONS:  metoprolol succinate 100 mg oral capsule, extended release: Last Dose Taken:  , 1 cap(s) orally once a day (at bedtime)      ALLERGIES: No Known Allergies      VITAL SIGNS:  ICU Vital Signs Last 24 Hrs  T(C): 37.9 (15 Nov 2024 17:58), Max: 37.9 (15 Nov 2024 17:58)  T(F): 100.2 (15 Nov 2024 17:58), Max: 100.2 (15 Nov 2024 17:58)  HR: 75 (15 Nov 2024 19:45) (63 - 90)  BP: 147/73 (15 Nov 2024 06:29) (133/65 - 172/74)  BP(mean): --  ABP: 106/43 (15 Nov 2024 19:45) (89/40 - 135/46)  ABP(mean): 63 (15 Nov 2024 19:45) (53 - 77)  RR: 19 (15 Nov 2024 19:45) (7 - 27)  SpO2: 100% (15 Nov 2024 19:00) (96% - 100%)    O2 Parameters below as of 15 Nov 2024 19:00  Patient On (Oxygen Delivery Method): ventilator  O2 Flow (L/min): 40          NEURO  Exam:  dexMEDEtomidine Infusion 0.5 MICROgram(s)/kG/Hr IV Continuous <Continuous>  HYDROmorphone  Injectable 0.5 milliGRAM(s) IV Push every 3 hours PRN Severe Pain (7 - 10)      RESPIRATORY  Mechanical Ventilation: Mode: AC/ CMV (Assist Control/ Continuous Mandatory Ventilation), RR (machine): 14, RR (patient): 26, TV (machine): 480, FiO2: 40, PEEP: 5, ITime: 0.9, MAP: 8, PIP: 13  ABG - ( 15 Nov 2024 19:35 )  pH: 7.41  /  pCO2: 38    /  pO2: 161   / HCO3: 24    / Base Excess: -0.4  /  SaO2: 99.8    Lactate: x                Exam:      CARDIOVASCULAR    Exam:  Cardiac Rhythm:      GI/NUTRITION  Exam:  Diet:  pantoprazole  Injectable 40 milliGRAM(s) IV Push daily  senna Syrup 10 milliLiter(s) Oral at bedtime      GENITOURINARY/RENAL  albumin human  5% IVPB 250 milliLiter(s) IV Intermittent once  albumin human  5% IVPB 250 milliLiter(s) IV Intermittent once  multiple electrolytes Injection Type 1 1000 milliLiter(s) IV Continuous <Continuous>      11-14 @ 07:01  -  11-15 @ 07:00  --------------------------------------------------------  IN:    Oral Fluid: 60 mL    Platelets - Single Donor: 225 mL  Total IN: 285 mL    OUT:    Voided (mL): 300 mL  Total OUT: 300 mL    Total NET: -15 mL      11-15 @ 07:01  -  11-15 @ 19:58  --------------------------------------------------------  IN:    Dexmedetomidine: 57.6 mL    Insulin: 8 mL    multiple electrolytes Injection Type 1 Bolus: 1000 mL    PRBCs (Packed Red Blood Cells): 300 mL  Total IN: 1365.6 mL    OUT:    Bulb (mL): 40 mL    Bulb (mL): 75 mL    Bulb (mL): 50 mL    Indwelling Catheter - Urethral (mL): 115 mL  Total OUT: 280 mL    Total NET: 1085.6 mL        Weight (kg): 93.6 (11-15 @ 05:30)  11-15    141  |  110[H]  |  17  ----------------------------<  150[H]  3.3[L]   |  21[L]  |  0.54    Ca    7.4[L]      15 Nov 2024 18:21  Phos  1.9     11-15  Mg     2.2     11-15    TPro  4.2[L]  /  Alb  2.7[L]  /  TBili  2.2[H]  /  DBili  x   /  AST  1243[H]  /  ALT  616[H]  /  AlkPhos  131[H]  11-15    [ ] Castro catheter, indication: urine output monitoring in critically ill patient    HEMATOLOGIC  [ ] VTE Prophylaxis:                          9.9    11.30 )-----------( 105      ( 15 Nov 2024 18:21 )             31.6     PT/INR - ( 15 Nov 2024 18:21 )   PT: 21.7 sec;   INR: 1.90 ratio         PTT - ( 15 Nov 2024 18:21 )  PTT:33.9 sec  Transfusion: [ ] PRBC	[ ] Platelets	[ ] FFP	[ ] Cryoprecipitate      INFECTIOUS DISEASES  ampicillin/sulbactam  IVPB 3 Gram(s) IV Intermittent every 6 hours  fluconAZOLE IVPB 400 milliGRAM(s) IV Intermittent daily  mycophenolate mofetil Suspension 1000 milliGRAM(s) Enteral Tube <User Schedule>  trimethoprim  40 mG/sulfamethoxazole 200 mG Suspension 10 milliLiter(s) Enteral Tube daily  valGANciclovir 50 mG/mL Oral Solution 450 milliGRAM(s) Oral daily    RECENT CULTURES:      ENDOCRINE  insulin regular Infusion 8 Unit(s)/Hr IV Continuous <Continuous>    CAPILLARY BLOOD GLUCOSE      POCT Blood Glucose.: 119 mg/dL (15 Nov 2024 18:53)  POCT Blood Glucose.: 145 mg/dL (15 Nov 2024 18:06)  POCT Blood Glucose.: 137 mg/dL (15 Nov 2024 06:18)  POCT Blood Glucose.: 121 mg/dL (14 Nov 2024 21:24)      PATIENT CARE ACCESS DEVICES:  [ ] Peripheral IV  [ ] Central Venous Line	[ ] R	[ ] L	[ ] IJ	[ ] Fem	[ ] SC	Placed:   [ ] Arterial Line		[ ] R	[ ] L	[ ] Fem	[ ] Rad	[ ] Ax	Placed:   [ ] PICC:					[ ] Mediport  [ ] Urinary Catheter, Date Placed:   [x] Necessity of urinary, arterial, and venous catheters discussed    OTHER MEDICATIONS: chlorhexidine 0.12% Liquid 15 milliLiter(s) Oral Mucosa every 12 hours  chlorhexidine 2% Cloths 1 Application(s) Topical <User Schedule>      IMAGING STUDIES: HISTORY OF PRESENT ILLNESS:  STERLING BRYANT is a 74y Male     PAST MEDICAL HISTORY:   DM (diabetes mellitus)  HTN (hypertension)  Paroxysmal atrial fibrillation  Cirrhosis  HCC (hepatocellular carcinoma)  History of BPH        PAST SURGICAL HISTORY:     FAMILY HISTORY:     SOCIAL HISTORY:    CODE STATUS: full code    HOME MEDICATIONS:  metoprolol succinate 100 mg oral capsule, extended release: Last Dose Taken:  , 1 cap(s) orally once a day (at bedtime)      ALLERGIES: No Known Allergies      VITAL SIGNS:  ICU Vital Signs Last 24 Hrs  T(C): 37.9 (15 Nov 2024 17:58), Max: 37.9 (15 Nov 2024 17:58)  T(F): 100.2 (15 Nov 2024 17:58), Max: 100.2 (15 Nov 2024 17:58)  HR: 75 (15 Nov 2024 19:45) (63 - 90)  BP: 147/73 (15 Nov 2024 06:29) (133/65 - 172/74)  BP(mean): --  ABP: 106/43 (15 Nov 2024 19:45) (89/40 - 135/46)  ABP(mean): 63 (15 Nov 2024 19:45) (53 - 77)  RR: 19 (15 Nov 2024 19:45) (7 - 27)  SpO2: 100% (15 Nov 2024 19:00) (96% - 100%)    O2 Parameters below as of 15 Nov 2024 19:00  Patient On (Oxygen Delivery Method): ventilator  O2 Flow (L/min): 40          NEURO  Exam: sedated but following  dexMEDEtomidine Infusion 0.5 MICROgram(s)/kG/Hr IV Continuous <Continuous>  HYDROmorphone  Injectable 0.5 milliGRAM(s) IV Push every 3 hours PRN Severe Pain (7 - 10)      RESPIRATORY  Mechanical Ventilation: Mode: AC/ CMV (Assist Control/ Continuous Mandatory Ventilation), RR (machine): 14, RR (patient): 26, TV (machine): 480, FiO2: 40, PEEP: 5, ITime: 0.9, MAP: 8, PIP: 13  ABG - ( 15 Nov 2024 19:35 )  pH: 7.41  /  pCO2: 38    /  pO2: 161   / HCO3: 24    / Base Excess: -0.4  /  SaO2: 99.8    Lactate: 1.5  Exam: clear to auscultation bilaterally      CARDIOVASCULAR  Exam:  Cardiac Rhythm: normal sinus rhythm      GI/NUTRITION  Exam: abdomen soft, nontender, nondistended,   Diet:  pantoprazole  Injectable 40 milliGRAM(s) IV Push daily  senna Syrup 10 milliLiter(s) Oral at bedtime      GENITOURINARY/RENAL  albumin human  5% IVPB 250 milliLiter(s) IV Intermittent once  albumin human  5% IVPB 250 milliLiter(s) IV Intermittent once  multiple electrolytes Injection Type 1 1000 milliLiter(s) IV Continuous <Continuous>      11-14 @ 07:01  -  11-15 @ 07:00  --------------------------------------------------------  IN:    Oral Fluid: 60 mL    Platelets - Single Donor: 225 mL  Total IN: 285 mL    OUT:    Voided (mL): 300 mL  Total OUT: 300 mL    Total NET: -15 mL      11-15 @ 07:01  -  11-15 @ 19:58  --------------------------------------------------------  IN:    Dexmedetomidine: 57.6 mL    Insulin: 8 mL    multiple electrolytes Injection Type 1 Bolus: 1000 mL    PRBCs (Packed Red Blood Cells): 300 mL  Total IN: 1365.6 mL    OUT:    Bulb (mL): 40 mL    Bulb (mL): 75 mL    Bulb (mL): 50 mL    Indwelling Catheter - Urethral (mL): 115 mL  Total OUT: 280 mL    Total NET: 1085.6 mL        Weight (kg): 93.6 (11-15 @ 05:30)  11-15    141  |  110[H]  |  17  ----------------------------<  150[H]  3.3[L]   |  21[L]  |  0.54    Ca    7.4[L]      15 Nov 2024 18:21  Phos  1.9     11-15  Mg     2.2     11-15    TPro  4.2[L]  /  Alb  2.7[L]  /  TBili  2.2[H]  /  DBili  x   /  AST  1243[H]  /  ALT  616[H]  /  AlkPhos  131[H]  11-15    [ ] Castro catheter, indication: urine output monitoring in critically ill patient    HEMATOLOGIC  [ ] VTE Prophylaxis:                          9.9    11.30 )-----------( 105      ( 15 Nov 2024 18:21 )             31.6     PT/INR - ( 15 Nov 2024 18:21 )   PT: 21.7 sec;   INR: 1.90 ratio         PTT - ( 15 Nov 2024 18:21 )  PTT:33.9 sec  Transfusion: [ ] PRBC	[ ] Platelets	[ ] FFP	[ ] Cryoprecipitate      INFECTIOUS DISEASES  ampicillin/sulbactam  IVPB 3 Gram(s) IV Intermittent every 6 hours  fluconAZOLE IVPB 400 milliGRAM(s) IV Intermittent daily  mycophenolate mofetil Suspension 1000 milliGRAM(s) Enteral Tube <User Schedule>  trimethoprim  40 mG/sulfamethoxazole 200 mG Suspension 10 milliLiter(s) Enteral Tube daily  valGANciclovir 50 mG/mL Oral Solution 450 milliGRAM(s) Oral daily    RECENT CULTURES:      ENDOCRINE  insulin regular Infusion 8 Unit(s)/Hr IV Continuous <Continuous>    CAPILLARY BLOOD GLUCOSE      POCT Blood Glucose.: 119 mg/dL (15 Nov 2024 18:53)  POCT Blood Glucose.: 145 mg/dL (15 Nov 2024 18:06)  POCT Blood Glucose.: 137 mg/dL (15 Nov 2024 06:18)  POCT Blood Glucose.: 121 mg/dL (14 Nov 2024 21:24)      PATIENT CARE ACCESS DEVICES:  [ ] Peripheral IV  [ ] Central Venous Line	[ ] R	[ ] L	[ ] IJ	[ ] Fem	[ ] SC	Placed:   [ ] Arterial Line		[ ] R	[ ] L	[ ] Fem	[ ] Rad	[ ] Ax	Placed:   [ ] PICC:					[ ] Mediport  [ ] Urinary Catheter, Date Placed:   [x] Necessity of urinary, arterial, and venous catheters discussed    OTHER MEDICATIONS: chlorhexidine 0.12% Liquid 15 milliLiter(s) Oral Mucosa every 12 hours  chlorhexidine 2% Cloths 1 Application(s) Topical <User Schedule>      IMAGING STUDIES: HISTORY OF PRESENT ILLNESS:  73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 Pt underwent Y90 Sept, 2023 with favorable treatment response.Pt completed OLTx evaluation and listed for OLTx 5/03/2024.    Patient now s/p OLT on 11/15, coming to SICU for hemodynamic monitoring post-op.  On insulin drip which was started intra-op. Hypotensive on arrival, but improving with fluids, no pressors required.        PAST MEDICAL HISTORY:   DM (diabetes mellitus)  HTN (hypertension)  Paroxysmal atrial fibrillation  Cirrhosis  HCC (hepatocellular carcinoma)  History of BPH        PAST SURGICAL HISTORY:     FAMILY HISTORY:     SOCIAL HISTORY:    CODE STATUS: full code    HOME MEDICATIONS:  metoprolol succinate 100 mg oral capsule, extended release: Last Dose Taken:  , 1 cap(s) orally once a day (at bedtime)      ALLERGIES: No Known Allergies      VITAL SIGNS:  ICU Vital Signs Last 24 Hrs  T(C): 37.9 (15 Nov 2024 17:58), Max: 37.9 (15 Nov 2024 17:58)  T(F): 100.2 (15 Nov 2024 17:58), Max: 100.2 (15 Nov 2024 17:58)  HR: 75 (15 Nov 2024 19:45) (63 - 90)  BP: 147/73 (15 Nov 2024 06:29) (133/65 - 172/74)  BP(mean): --  ABP: 106/43 (15 Nov 2024 19:45) (89/40 - 135/46)  ABP(mean): 63 (15 Nov 2024 19:45) (53 - 77)  RR: 19 (15 Nov 2024 19:45) (7 - 27)  SpO2: 100% (15 Nov 2024 19:00) (96% - 100%)    O2 Parameters below as of 15 Nov 2024 19:00  Patient On (Oxygen Delivery Method): ventilator  O2 Flow (L/min): 40          NEURO  Exam: sedated but following  dexMEDEtomidine Infusion 0.5 MICROgram(s)/kG/Hr IV Continuous <Continuous>  HYDROmorphone  Injectable 0.5 milliGRAM(s) IV Push every 3 hours PRN Severe Pain (7 - 10)      RESPIRATORY  Mechanical Ventilation: Mode: AC/ CMV (Assist Control/ Continuous Mandatory Ventilation), RR (machine): 14, RR (patient): 26, TV (machine): 480, FiO2: 40, PEEP: 5, ITime: 0.9, MAP: 8, PIP: 13  ABG - ( 15 Nov 2024 19:35 )  pH: 7.41  /  pCO2: 38    /  pO2: 161   / HCO3: 24    / Base Excess: -0.4  /  SaO2: 99.8    Lactate: 1.5  Exam: clear to auscultation bilaterally      CARDIOVASCULAR  Exam: regular rate and rhythm  Cardiac Rhythm: normal sinus rhythm      GI/NUTRITION  Exam: abdomen soft, nontender, nondistended, incision clean and dry, 3 abd drains in place  Diet: NPO while intubated  pantoprazole  Injectable 40 milliGRAM(s) IV Push daily  senna Syrup 10 milliLiter(s) Oral at bedtime      GENITOURINARY/RENAL  albumin human  5% IVPB 250 milliLiter(s) IV Intermittent once  albumin human  5% IVPB 250 milliLiter(s) IV Intermittent once  multiple electrolytes Injection Type 1 1000 milliLiter(s) IV Continuous <Continuous>      11-14 @ 07:01  -  11-15 @ 07:00  --------------------------------------------------------  IN:    Oral Fluid: 60 mL    Platelets - Single Donor: 225 mL  Total IN: 285 mL    OUT:    Voided (mL): 300 mL  Total OUT: 300 mL    Total NET: -15 mL      11-15 @ 07:01  -  11-15 @ 19:58  --------------------------------------------------------  IN:    Dexmedetomidine: 57.6 mL    Insulin: 8 mL    multiple electrolytes Injection Type 1 Bolus: 1000 mL    PRBCs (Packed Red Blood Cells): 300 mL  Total IN: 1365.6 mL    OUT:    Bulb (mL): 40 mL    Bulb (mL): 75 mL    Bulb (mL): 50 mL    Indwelling Catheter - Urethral (mL): 115 mL  Total OUT: 280 mL    Total NET: 1085.6 mL        Weight (kg): 93.6 (11-15 @ 05:30)  11-15    141  |  110[H]  |  17  ----------------------------<  150[H]  3.3[L]   |  21[L]  |  0.54    Ca    7.4[L]      15 Nov 2024 18:21  Phos  1.9     11-15  Mg     2.2     11-15    TPro  4.2[L]  /  Alb  2.7[L]  /  TBili  2.2[H]  /  DBili  x   /  AST  1243[H]  /  ALT  616[H]  /  AlkPhos  131[H]  11-15    [ ] Castro catheter, indication: urine output monitoring in critically ill patient    HEMATOLOGIC  [ ] VTE Prophylaxis:                          9.9    11.30 )-----------( 105      ( 15 Nov 2024 18:21 )             31.6     PT/INR - ( 15 Nov 2024 18:21 )   PT: 21.7 sec;   INR: 1.90 ratio         PTT - ( 15 Nov 2024 18:21 )  PTT:33.9 sec  Transfusion: [ ] PRBC	[ ] Platelets	[ ] FFP	[ ] Cryoprecipitate      INFECTIOUS DISEASES  ampicillin/sulbactam  IVPB 3 Gram(s) IV Intermittent every 6 hours  fluconAZOLE IVPB 400 milliGRAM(s) IV Intermittent daily  mycophenolate mofetil Suspension 1000 milliGRAM(s) Enteral Tube <User Schedule>  trimethoprim  40 mG/sulfamethoxazole 200 mG Suspension 10 milliLiter(s) Enteral Tube daily  valGANciclovir 50 mG/mL Oral Solution 450 milliGRAM(s) Oral daily    RECENT CULTURES:      ENDOCRINE  insulin regular Infusion 8 Unit(s)/Hr IV Continuous <Continuous>    CAPILLARY BLOOD GLUCOSE      POCT Blood Glucose.: 119 mg/dL (15 Nov 2024 18:53)  POCT Blood Glucose.: 145 mg/dL (15 Nov 2024 18:06)  POCT Blood Glucose.: 137 mg/dL (15 Nov 2024 06:18)  POCT Blood Glucose.: 121 mg/dL (14 Nov 2024 21:24)      PATIENT CARE ACCESS DEVICES:  [ ] Peripheral IV  [ ] Central Venous Line	[ ] R	[ ] L	[ ] IJ	[ ] Fem	[ ] SC	Placed:   [ ] Arterial Line		[ ] R	[ ] L	[ ] Fem	[ ] Rad	[ ] Ax	Placed:   [ ] PICC:					[ ] Mediport  [ ] Urinary Catheter, Date Placed:   [x] Necessity of urinary, arterial, and venous catheters discussed    OTHER MEDICATIONS: chlorhexidine 0.12% Liquid 15 milliLiter(s) Oral Mucosa every 12 hours  chlorhexidine 2% Cloths 1 Application(s) Topical <User Schedule>      IMAGING STUDIES:

## 2024-11-15 NOTE — CHART NOTE - NSCHARTNOTEFT_GEN_A_CORE
POST-OPERATIVE NOTE    Subjective:  Patient is s/p . Recovering appropriately.     Vital Signs Last 24 Hrs  T(C): 37.9 (15 Nov 2024 17:58), Max: 37.9 (15 Nov 2024 17:58)  T(F): 100.2 (15 Nov 2024 17:58), Max: 100.2 (15 Nov 2024 17:58)  HR: 72 (15 Nov 2024 23:06) (63 - 83)  BP: 147/73 (15 Nov 2024 06:29) (133/65 - 162/75)  BP(mean): --  RR: 16 (15 Nov 2024 22:45) (7 - 27)  SpO2: 100% (15 Nov 2024 23:06) (96% - 100%)    Parameters below as of 15 Nov 2024 23:07      O2 Concentration (%): 40  I&O's Detail    14 Nov 2024 07:01  -  15 Nov 2024 07:00  --------------------------------------------------------  IN:    Oral Fluid: 60 mL    Platelets - Single Donor: 225 mL  Total IN: 285 mL    OUT:    Voided (mL): 300 mL  Total OUT: 300 mL    Total NET: -15 mL      15 Nov 2024 07:01  -  15 Nov 2024 23:13  --------------------------------------------------------  IN:    Albumin 5%  - 250 mL: 500 mL    Dexmedetomidine: 92.4 mL    Enteral Tube Flush: 60 mL    Insulin: 20 mL    IV PiggyBack: 100 mL    multiple electrolytes Injection Type 1 Bolus: 1000 mL    multiple electrolytes Injection Type 1.: 375 mL    PRBCs (Packed Red Blood Cells): 300 mL  Total IN: 2447.4 mL    OUT:    Bulb (mL): 155 mL    Bulb (mL): 80 mL    Bulb (mL): 60 mL    Indwelling Catheter - Urethral (mL): 245 mL  Total OUT: 540 mL    Total NET: 1907.4 mL        ampicillin/sulbactam  IVPB 3  fluconAZOLE IVPB 400  trimethoprim  40 mG/sulfamethoxazole 200 mG Suspension 10  valGANciclovir 50 mG/mL Oral Solution 450  ampicillin/sulbactam  IVPB 3  fluconAZOLE IVPB 400  trimethoprim  40 mG/sulfamethoxazole 200 mG Suspension 10  valGANciclovir 50 mG/mL Oral Solution 450    PAST MEDICAL & SURGICAL HISTORY:  DM (diabetes mellitus)      HTN (hypertension)      Paroxysmal atrial fibrillation      Cirrhosis      HCC (hepatocellular carcinoma)      History of BPH            Physical Exam:  General: NAD, resting comfortably in bed  Pulmonary: Nonlabored breathing, no respiratory distress  Cardiovascular: NSR  Abdominal: soft, NT/ND  Extremities: WWP      LABS:                        8.6    6.23  )-----------( 57       ( 15 Nov 2024 19:47 )             27.7     11-15    141  |  110[H]  |  17  ----------------------------<  150[H]  3.3[L]   |  21[L]  |  0.54    Ca    7.4[L]      15 Nov 2024 18:21  Phos  1.9     11-15  Mg     2.2     11-15    TPro  4.2[L]  /  Alb  2.7[L]  /  TBili  2.2[H]  /  DBili  x   /  AST  1243[H]  /  ALT  616[H]  /  AlkPhos  131[H]  11-15    PT/INR - ( 15 Nov 2024 18:21 )   PT: 21.7 sec;   INR: 1.90 ratio         PTT - ( 15 Nov 2024 18:21 )  PTT:33.9 sec  CAPILLARY BLOOD GLUCOSE      POCT Blood Glucose.: 97 mg/dL (15 Nov 2024 22:58)  POCT Blood Glucose.: 110 mg/dL (15 Nov 2024 22:04)  POCT Blood Glucose.: 115 mg/dL (15 Nov 2024 21:04)  POCT Blood Glucose.: 132 mg/dL (15 Nov 2024 20:04)  POCT Blood Glucose.: 119 mg/dL (15 Nov 2024 18:53)  POCT Blood Glucose.: 145 mg/dL (15 Nov 2024 18:06)  POCT Blood Glucose.: 137 mg/dL (15 Nov 2024 06:18)      Radiology and Additional Studies:    Assessment:  The patient is a 74y Male who is now several hours post-op from a     Plan:  - Pain control as needed  - DVT ppx  - OOB and ambulating as tolerated  - F/u AM labs POST-OPERATIVE NOTE    Subjective:  Patient is s/p OLT for HCC, under simulect and methylprednisolone induction. Recovering appropriately.      Vital Signs Last 24 Hrs  T(C): 37.9 (15 Nov 2024 17:58), Max: 37.9 (15 Nov 2024 17:58)  T(F): 100.2 (15 Nov 2024 17:58), Max: 100.2 (15 Nov 2024 17:58)  HR: 72 (15 Nov 2024 23:06) (63 - 83)  BP: 147/73 (15 Nov 2024 06:29) (133/65 - 162/75)  BP(mean): --  RR: 16 (15 Nov 2024 22:45) (7 - 27)  SpO2: 100% (15 Nov 2024 23:06) (96% - 100%)    Parameters below as of 15 Nov 2024 23:07      O2 Concentration (%): 40  I&O's Detail    14 Nov 2024 07:01  -  15 Nov 2024 07:00  --------------------------------------------------------  IN:    Oral Fluid: 60 mL    Platelets - Single Donor: 225 mL  Total IN: 285 mL    OUT:    Voided (mL): 300 mL  Total OUT: 300 mL    Total NET: -15 mL      15 Nov 2024 07:01  -  15 Nov 2024 23:13  --------------------------------------------------------  IN:    Albumin 5%  - 250 mL: 500 mL    Dexmedetomidine: 92.4 mL    Enteral Tube Flush: 60 mL    Insulin: 20 mL    IV PiggyBack: 100 mL    multiple electrolytes Injection Type 1 Bolus: 1000 mL    multiple electrolytes Injection Type 1.: 375 mL    PRBCs (Packed Red Blood Cells): 300 mL  Total IN: 2447.4 mL    OUT:    Bulb (mL): 155 mL    Bulb (mL): 80 mL    Bulb (mL): 60 mL    Indwelling Catheter - Urethral (mL): 245 mL  Total OUT: 540 mL    Total NET: 1907.4 mL        ampicillin/sulbactam  IVPB 3  fluconAZOLE IVPB 400  trimethoprim  40 mG/sulfamethoxazole 200 mG Suspension 10  valGANciclovir 50 mG/mL Oral Solution 450  ampicillin/sulbactam  IVPB 3  fluconAZOLE IVPB 400  trimethoprim  40 mG/sulfamethoxazole 200 mG Suspension 10  valGANciclovir 50 mG/mL Oral Solution 450    PAST MEDICAL & SURGICAL HISTORY:  DM (diabetes mellitus)      HTN (hypertension)      Paroxysmal atrial fibrillation      Cirrhosis      HCC (hepatocellular carcinoma)      History of BPH            Physical Exam:  General: NAD, resting comfortably in bed, on precedex gtt, arousable   Pulmonary: mechanical ventilation, spontaneous breathing    Cardiovascular: NSR on cardiac monitor   Abdominal: soft, dressings c/d/i, appropriately tender; 3 JPs in place, #1&3 serosanguinous, #2 serous.    LABS:                        8.6    6.23  )-----------( 57       ( 15 Nov 2024 19:47 )             27.7     11-15    141  |  110[H]  |  17  ----------------------------<  150[H]  3.3[L]   |  21[L]  |  0.54    Ca    7.4[L]      15 Nov 2024 18:21  Phos  1.9     11-15  Mg     2.2     11-15    TPro  4.2[L]  /  Alb  2.7[L]  /  TBili  2.2[H]  /  DBili  x   /  AST  1243[H]  /  ALT  616[H]  /  AlkPhos  131[H]  11-15    PT/INR - ( 15 Nov 2024 18:21 )   PT: 21.7 sec;   INR: 1.90 ratio         PTT - ( 15 Nov 2024 18:21 )  PTT:33.9 sec  CAPILLARY BLOOD GLUCOSE      POCT Blood Glucose.: 97 mg/dL (15 Nov 2024 22:58)  POCT Blood Glucose.: 110 mg/dL (15 Nov 2024 22:04)  POCT Blood Glucose.: 115 mg/dL (15 Nov 2024 21:04)  POCT Blood Glucose.: 132 mg/dL (15 Nov 2024 20:04)  POCT Blood Glucose.: 119 mg/dL (15 Nov 2024 18:53)  POCT Blood Glucose.: 145 mg/dL (15 Nov 2024 18:06)  POCT Blood Glucose.: 137 mg/dL (15 Nov 2024 06:18)      Radiology and Additional Studies:    Assessment:  The patient is a 74y Male who is now several hours post-op from an OLT for HCC, recovering appropriately in SICU. Post-op ultrasound with patent vessels.     Plan:  - Wean to extubate  - Pain control PRN  - F/u serial labs  - Maintain NGT  - Appreciate excellent care per SICU

## 2024-11-15 NOTE — BRIEF OPERATIVE NOTE - OPERATION/FINDINGS
S/P Orthotopic open liver transplantation from a  donor under steroids and simulect and methylprednisone induction.    CIT 21 hours    Side-side Cavocavostomy (Running 3-0 Prolene suture for both posterior and anterior walls)  End-end portoportal reconstruction (Running 6-0 Prolene sutures for both posterior and anterior walls)  End-end Single arterial reconstruction (Running 6-0 Prolene sutures for both posterior and anterior walls) .Conventional arterial anatomy on the donor and recipient sides.  D-D biliary reconstruction without stenting (Running 5-0 PDS sutures for both posterior and anterior walls)      Preperfusion donor liver bx      3 ALYSSA drains  # 1 Under the right lobe  # 2 At the [place]  # 3 Under the left lobe

## 2024-11-16 NOTE — PHYSICAL THERAPY INITIAL EVALUATION ADULT - PHYSICAL ASSIST/NONPHYSICAL ASSIST: GAIT, REHAB EVAL
Medical Necessity Clause: This procedure was medically necessary because the lesions that were treated were: How Many Mls Were Removed From The 40 Mg/Ml (5ml) Vial When Preparing The Injectable Solution?: 0 Detail Level: Detailed Treatment Number (Optional): 4 Total Volume Injected (Ccs- Only Use Numbers And Decimals): 0.1 Include Z78.9 (Other Specified Conditions Influencing Health Status) As An Associated Diagnosis?: No Concentration Of Solution Injected (Mg/Ml): 40.0 Consent: The risks of atrophy were reviewed with the patient. Validate Note Data When Using Inventory: Yes Kenalog Preparation: Kenalog Which Kenalog Vial Was Used?: Kenalog 40 mg/ml (5 ml vial) Kenalog Type Of Vial: Multiple Dose supervision/verbal cues/nonverbal cues (demo/gestures)

## 2024-11-16 NOTE — OCCUPATIONAL THERAPY INITIAL EVALUATION ADULT - PLANNED THERAPY INTERVENTIONS, OT EVAL
ADL retraining/balance training/bed mobility training/transfer training ADL retraining/balance training/bed mobility training/strengthening/transfer training

## 2024-11-16 NOTE — PROGRESS NOTE ADULT - SUBJECTIVE AND OBJECTIVE BOX
HISTORY  74y Male    24 HOUR EVENTS:    SUBJECTIVE/ROS:  [ ] A ten-point review of systems was otherwise negative except as noted.  [ ] Due to altered mental status/intubation, subjective information were not able to be obtained from the patient. History was obtained, to the extent possible, from review of the chart and collateral sources of information.      NEURO  RASS:     GCS:     CAM ICU:  Exam: awake, alert, oriented  Meds: HYDROmorphone  Injectable 0.5 milliGRAM(s) IV Push every 3 hours PRN Severe Pain (7 - 10)    [x] Adequacy of sedation and pain control has been assessed and adjusted      RESPIRATORY  RR: 17 (11-16-24 @ 01:45) (7 - 27)  SpO2: 100% (11-16-24 @ 01:45) (96% - 100%)  Wt(kg): --  Exam: unlabored, clear to auscultation bilaterally  Mechanical Ventilation: Mode: off  ABG - ( 15 Nov 2024 22:40 )  pH: 7.41  /  pCO2: 40    /  pO2: 149   / HCO3: 25    / Base Excess: 0.7   /  SaO2: 99.2    Lactate: x                [N/A] Extubation Readiness Assessed  Meds:       CARDIOVASCULAR  HR: 77 (11-16-24 @ 01:45) (63 - 83)  BP: 147/73 (11-15-24 @ 06:29) (133/65 - 147/73)  BP(mean): --  ABP: 164/61 (11-16-24 @ 01:45) (89/40 - 177/61)  ABP(mean): 96 (11-16-24 @ 01:45) (53 - 99)  Wt(kg): --  CVP(cm H2O): --      Exam: regular rate and rhythm  Cardiac Rhythm: sinus  Perfusion     [x]Adequate   [ ]Inadequate  Mentation   [x]Normal       [ ]Reduced  Extremities  [x]Warm         [ ]Cool  Volume Status [ ]Hypervolemic [x]Euvolemic [ ]Hypovolemic  Meds:       GI/NUTRITION  Exam: soft, nontender, nondistended, incision C/D/I  Diet:  Meds: pantoprazole  Injectable 40 milliGRAM(s) IV Push daily  senna Syrup 10 milliLiter(s) Oral at bedtime      GENITOURINARY  I&O's Detail    11-14 @ 07:01  -  11-15 @ 07:00  --------------------------------------------------------  IN:    Oral Fluid: 60 mL    Platelets - Single Donor: 225 mL  Total IN: 285 mL    OUT:    Voided (mL): 300 mL  Total OUT: 300 mL    Total NET: -15 mL      11-15 @ 07:01  -  11-16 @ 02:44  --------------------------------------------------------  IN:    Albumin 5%  - 250 mL: 500 mL    Dexmedetomidine: 92.4 mL    Enteral Tube Flush: 60 mL    Insulin: 20 mL    IV PiggyBack: 200 mL    IV PiggyBack: 50 mL    multiple electrolytes Injection Type 1 Bolus: 1000 mL    multiple electrolytes Injection Type 1.: 750 mL    PRBCs (Packed Red Blood Cells): 300 mL  Total IN: 2972.4 mL    OUT:    Bulb (mL): 95 mL    Bulb (mL): 120 mL    Bulb (mL): 205 mL    Indwelling Catheter - Urethral (mL): 390 mL  Total OUT: 810 mL    Total NET: 2162.4 mL        Weight (kg): 93.6 (11-15 @ 05:30)  11-15    141  |  110[H]  |  17  ----------------------------<  102[H]  3.4[L]   |  23  |  0.54    Ca    7.6[L]      15 Nov 2024 22:57  Phos  2.4     11-15  Mg     2.1     11-15    TPro  4.2[L]  /  Alb  2.8[L]  /  TBili  1.1  /  DBili  x   /  AST  813[H]  /  ALT  527[H]  /  AlkPhos  102  11-15    [ ] Castro catheter, indication: N/A  Meds: multiple electrolytes Injection Type 1 1000 milliLiter(s) IV Continuous <Continuous>        HEMATOLOGIC  Meds:   [x] VTE Prophylaxis                        8.4    5.28  )-----------( 50       ( 15 Nov 2024 22:57 )             26.9     PT/INR - ( 15 Nov 2024 22:57 )   PT: 21.7 sec;   INR: 1.90 ratio         PTT - ( 15 Nov 2024 22:57 )  PTT:35.1 sec  Transfusion     [ ] PRBC   [ ] Platelets   [ ] FFP   [ ] Cryoprecipitate      INFECTIOUS DISEASES  WBC Count: 5.28 K/uL (11-15 @ 22:57)  WBC Count: 6.23 K/uL (11-15 @ 19:47)  WBC Count: 11.30 K/uL (11-15 @ 18:21)    RECENT CULTURES:    Meds: ampicillin/sulbactam  IVPB 3 Gram(s) IV Intermittent every 6 hours  fluconAZOLE IVPB 400 milliGRAM(s) IV Intermittent daily  mycophenolate mofetil Suspension 1000 milliGRAM(s) Enteral Tube <User Schedule>  trimethoprim  40 mG/sulfamethoxazole 200 mG Suspension 10 milliLiter(s) Enteral Tube daily  valGANciclovir 50 mG/mL Oral Solution 450 milliGRAM(s) Oral daily        ENDOCRINE  CAPILLARY BLOOD GLUCOSE      POCT Blood Glucose.: 117 mg/dL (15 Nov 2024 23:24)  POCT Blood Glucose.: 97 mg/dL (15 Nov 2024 22:58)  POCT Blood Glucose.: 110 mg/dL (15 Nov 2024 22:04)  POCT Blood Glucose.: 115 mg/dL (15 Nov 2024 21:04)  POCT Blood Glucose.: 132 mg/dL (15 Nov 2024 20:04)  POCT Blood Glucose.: 119 mg/dL (15 Nov 2024 18:53)  POCT Blood Glucose.: 145 mg/dL (15 Nov 2024 18:06)  POCT Blood Glucose.: 137 mg/dL (15 Nov 2024 06:18)    Meds: insulin lispro (ADMELOG) corrective regimen sliding scale   SubCutaneous every 4 hours  methylPREDNISolone sodium succinate IVPB   IV Intermittent   methylPREDNISolone sodium succinate IVPB 250 milliGRAM(s) IV Intermittent every 24 hours        OTHER MEDICATIONS:  chlorhexidine 0.12% Liquid 15 milliLiter(s) Oral Mucosa every 12 hours  chlorhexidine 2% Cloths 1 Application(s) Topical <User Schedule>      CODE STATUS: full code HISTORY  74y Male    24 HOUR EVENTS:  - extubated  - 2L O2 NC  - post op US with no significant abnormalities  - initially hypotensive, improving with 750 cc 5% albumin  - insulin gtt dc'ed      SUBJECTIVE/ROS:  [ ] A ten-point review of systems was otherwise negative except as noted.  [ ] Due to altered mental status/intubation, subjective information were not able to be obtained from the patient. History was obtained, to the extent possible, from review of the chart and collateral sources of information.      NEURO  RASS:     GCS:     CAM ICU:  Exam: A&Ox3  Meds: HYDROmorphone  Injectable 0.5 milliGRAM(s) IV Push every 3 hours PRN Severe Pain (7 - 10)    [x] Adequacy of sedation and pain control has been assessed and adjusted      RESPIRATORY  RR: 17 (11-16-24 @ 01:45) (7 - 27)  SpO2: 100% (11-16-24 @ 01:45) (96% - 100%)  Wt(kg): --  Exam: unlabored, clear to auscultation bilaterally  ABG - ( 15 Nov 2024 22:40 )  pH: 7.41  /  pCO2: 40    /  pO2: 149   / HCO3: 25    / Base Excess: 0.7   /  SaO2: 99.2    Lactate: x                [N/A] Extubation Readiness Assessed  Meds:       CARDIOVASCULAR  HR: 77 (11-16-24 @ 01:45) (63 - 83)  BP: 147/73 (11-15-24 @ 06:29) (133/65 - 147/73)  BP(mean): --  ABP: 164/61 (11-16-24 @ 01:45) (89/40 - 177/61)  ABP(mean): 96 (11-16-24 @ 01:45) (53 - 99)  Wt(kg): --  CVP(cm H2O): --      Exam: regular rate and rhythm  Cardiac Rhythm: sinus  Perfusion     [x]Adequate   [ ]Inadequate  Mentation   [x]Normal       [ ]Reduced  Extremities  [x]Warm         [ ]Cool  Volume Status [ ]Hypervolemic [x]Euvolemic [ ]Hypovolemic  Meds:       GI/NUTRITION  Exam: soft, nontender, nondistended, incision C/D/I  Diet: NPO  Meds: pantoprazole  Injectable 40 milliGRAM(s) IV Push daily  senna Syrup 10 milliLiter(s) Oral at bedtime      GENITOURINARY  I&O's Detail    11-14 @ 07:01  -  11-15 @ 07:00  --------------------------------------------------------  IN:    Oral Fluid: 60 mL    Platelets - Single Donor: 225 mL  Total IN: 285 mL    OUT:    Voided (mL): 300 mL  Total OUT: 300 mL    Total NET: -15 mL      11-15 @ 07:01 - 11-16 @ 02:44  --------------------------------------------------------  IN:    Albumin 5%  - 250 mL: 500 mL    Dexmedetomidine: 92.4 mL    Enteral Tube Flush: 60 mL    Insulin: 20 mL    IV PiggyBack: 200 mL    IV PiggyBack: 50 mL    multiple electrolytes Injection Type 1 Bolus: 1000 mL    multiple electrolytes Injection Type 1.: 750 mL    PRBCs (Packed Red Blood Cells): 300 mL  Total IN: 2972.4 mL    OUT:    Bulb (mL): 95 mL    Bulb (mL): 120 mL    Bulb (mL): 205 mL    Indwelling Catheter - Urethral (mL): 390 mL  Total OUT: 810 mL    Total NET: 2162.4 mL        Weight (kg): 93.6 (11-15 @ 05:30)  11-15    141  |  110[H]  |  17  ----------------------------<  102[H]  3.4[L]   |  23  |  0.54    Ca    7.6[L]      15 Nov 2024 22:57  Phos  2.4     11-15  Mg     2.1     11-15    TPro  4.2[L]  /  Alb  2.8[L]  /  TBili  1.1  /  DBili  x   /  AST  813[H]  /  ALT  527[H]  /  AlkPhos  102  11-15    [ ] Castro catheter, indication: N/A  Meds: multiple electrolytes Injection Type 1 1000 milliLiter(s) IV Continuous <Continuous>        HEMATOLOGIC  Meds:   [x] VTE Prophylaxis                        8.4    5.28  )-----------( 50       ( 15 Nov 2024 22:57 )             26.9     PT/INR - ( 15 Nov 2024 22:57 )   PT: 21.7 sec;   INR: 1.90 ratio         PTT - ( 15 Nov 2024 22:57 )  PTT:35.1 sec  Transfusion     [ ] PRBC   [ ] Platelets   [ ] FFP   [ ] Cryoprecipitate      INFECTIOUS DISEASES  WBC Count: 5.28 K/uL (11-15 @ 22:57)  WBC Count: 6.23 K/uL (11-15 @ 19:47)  WBC Count: 11.30 K/uL (11-15 @ 18:21)    RECENT CULTURES:    Meds: ampicillin/sulbactam  IVPB 3 Gram(s) IV Intermittent every 6 hours  fluconAZOLE IVPB 400 milliGRAM(s) IV Intermittent daily  mycophenolate mofetil Suspension 1000 milliGRAM(s) Enteral Tube <User Schedule>  trimethoprim  40 mG/sulfamethoxazole 200 mG Suspension 10 milliLiter(s) Enteral Tube daily  valGANciclovir 50 mG/mL Oral Solution 450 milliGRAM(s) Oral daily        ENDOCRINE  CAPILLARY BLOOD GLUCOSE      POCT Blood Glucose.: 117 mg/dL (15 Nov 2024 23:24)  POCT Blood Glucose.: 97 mg/dL (15 Nov 2024 22:58)  POCT Blood Glucose.: 110 mg/dL (15 Nov 2024 22:04)  POCT Blood Glucose.: 115 mg/dL (15 Nov 2024 21:04)  POCT Blood Glucose.: 132 mg/dL (15 Nov 2024 20:04)  POCT Blood Glucose.: 119 mg/dL (15 Nov 2024 18:53)  POCT Blood Glucose.: 145 mg/dL (15 Nov 2024 18:06)  POCT Blood Glucose.: 137 mg/dL (15 Nov 2024 06:18)    Meds: insulin lispro (ADMELOG) corrective regimen sliding scale   SubCutaneous every 4 hours  methylPREDNISolone sodium succinate IVPB   IV Intermittent   methylPREDNISolone sodium succinate IVPB 250 milliGRAM(s) IV Intermittent every 24 hours        OTHER MEDICATIONS:  chlorhexidine 0.12% Liquid 15 milliLiter(s) Oral Mucosa every 12 hours  chlorhexidine 2% Cloths 1 Application(s) Topical <User Schedule>      CODE STATUS: full code HISTORY  74y Male    24 HOUR EVENTS:  - extubated  - 2L O2 NC  - post op US with no significant abnormalities  - initially hypotensive, improving with 750 cc 5% albumin  - insulin gtt dc'ed  - 50 Metoprolol x 1 dose      SUBJECTIVE/ROS:  [ ] A ten-point review of systems was otherwise negative except as noted.  [ ] Due to altered mental status/intubation, subjective information were not able to be obtained from the patient. History was obtained, to the extent possible, from review of the chart and collateral sources of information.      NEURO  RASS:     GCS:     CAM ICU:  Exam: A&Ox3  Meds: HYDROmorphone  Injectable 0.5 milliGRAM(s) IV Push every 3 hours PRN Severe Pain (7 - 10)    [x] Adequacy of sedation and pain control has been assessed and adjusted      RESPIRATORY  RR: 17 (11-16-24 @ 01:45) (7 - 27)  SpO2: 100% (11-16-24 @ 01:45) (96% - 100%)  Wt(kg): --  Exam: unlabored, clear to auscultation bilaterally  ABG - ( 15 Nov 2024 22:40 )  pH: 7.41  /  pCO2: 40    /  pO2: 149   / HCO3: 25    / Base Excess: 0.7   /  SaO2: 99.2    Lactate: x                [N/A] Extubation Readiness Assessed  Meds:       CARDIOVASCULAR  HR: 77 (11-16-24 @ 01:45) (63 - 83)  BP: 147/73 (11-15-24 @ 06:29) (133/65 - 147/73)  BP(mean): --  ABP: 164/61 (11-16-24 @ 01:45) (89/40 - 177/61)  ABP(mean): 96 (11-16-24 @ 01:45) (53 - 99)  Wt(kg): --  CVP(cm H2O): --      Exam: regular rate and rhythm  Cardiac Rhythm: sinus  Perfusion     [x]Adequate   [ ]Inadequate  Mentation   [x]Normal       [ ]Reduced  Extremities  [x]Warm         [ ]Cool  Volume Status [ ]Hypervolemic [x]Euvolemic [ ]Hypovolemic  Meds:       GI/NUTRITION  Exam: soft, nontender, nondistended, incision C/D/I  Diet: NPO  Meds: pantoprazole  Injectable 40 milliGRAM(s) IV Push daily  senna Syrup 10 milliLiter(s) Oral at bedtime      GENITOURINARY  I&O's Detail    11-14 @ 07:01  -  11-15 @ 07:00  --------------------------------------------------------  IN:    Oral Fluid: 60 mL    Platelets - Single Donor: 225 mL  Total IN: 285 mL    OUT:    Voided (mL): 300 mL  Total OUT: 300 mL    Total NET: -15 mL      11-15 @ 07:01 - 11-16 @ 02:44  --------------------------------------------------------  IN:    Albumin 5%  - 250 mL: 500 mL    Dexmedetomidine: 92.4 mL    Enteral Tube Flush: 60 mL    Insulin: 20 mL    IV PiggyBack: 200 mL    IV PiggyBack: 50 mL    multiple electrolytes Injection Type 1 Bolus: 1000 mL    multiple electrolytes Injection Type 1.: 750 mL    PRBCs (Packed Red Blood Cells): 300 mL  Total IN: 2972.4 mL    OUT:    Bulb (mL): 95 mL    Bulb (mL): 120 mL    Bulb (mL): 205 mL    Indwelling Catheter - Urethral (mL): 390 mL  Total OUT: 810 mL    Total NET: 2162.4 mL        Weight (kg): 93.6 (11-15 @ 05:30)  11-15    141  |  110[H]  |  17  ----------------------------<  102[H]  3.4[L]   |  23  |  0.54    Ca    7.6[L]      15 Nov 2024 22:57  Phos  2.4     11-15  Mg     2.1     11-15    TPro  4.2[L]  /  Alb  2.8[L]  /  TBili  1.1  /  DBili  x   /  AST  813[H]  /  ALT  527[H]  /  AlkPhos  102  11-15    [ ] Castro catheter, indication: N/A  Meds: multiple electrolytes Injection Type 1 1000 milliLiter(s) IV Continuous <Continuous>        HEMATOLOGIC  Meds:   [x] VTE Prophylaxis                        8.4    5.28  )-----------( 50       ( 15 Nov 2024 22:57 )             26.9     PT/INR - ( 15 Nov 2024 22:57 )   PT: 21.7 sec;   INR: 1.90 ratio         PTT - ( 15 Nov 2024 22:57 )  PTT:35.1 sec  Transfusion     [ ] PRBC   [ ] Platelets   [ ] FFP   [ ] Cryoprecipitate      INFECTIOUS DISEASES  WBC Count: 5.28 K/uL (11-15 @ 22:57)  WBC Count: 6.23 K/uL (11-15 @ 19:47)  WBC Count: 11.30 K/uL (11-15 @ 18:21)    RECENT CULTURES:    Meds: ampicillin/sulbactam  IVPB 3 Gram(s) IV Intermittent every 6 hours  fluconAZOLE IVPB 400 milliGRAM(s) IV Intermittent daily  mycophenolate mofetil Suspension 1000 milliGRAM(s) Enteral Tube <User Schedule>  trimethoprim  40 mG/sulfamethoxazole 200 mG Suspension 10 milliLiter(s) Enteral Tube daily  valGANciclovir 50 mG/mL Oral Solution 450 milliGRAM(s) Oral daily        ENDOCRINE  CAPILLARY BLOOD GLUCOSE      POCT Blood Glucose.: 117 mg/dL (15 Nov 2024 23:24)  POCT Blood Glucose.: 97 mg/dL (15 Nov 2024 22:58)  POCT Blood Glucose.: 110 mg/dL (15 Nov 2024 22:04)  POCT Blood Glucose.: 115 mg/dL (15 Nov 2024 21:04)  POCT Blood Glucose.: 132 mg/dL (15 Nov 2024 20:04)  POCT Blood Glucose.: 119 mg/dL (15 Nov 2024 18:53)  POCT Blood Glucose.: 145 mg/dL (15 Nov 2024 18:06)  POCT Blood Glucose.: 137 mg/dL (15 Nov 2024 06:18)    Meds: insulin lispro (ADMELOG) corrective regimen sliding scale   SubCutaneous every 4 hours  methylPREDNISolone sodium succinate IVPB   IV Intermittent   methylPREDNISolone sodium succinate IVPB 250 milliGRAM(s) IV Intermittent every 24 hours        OTHER MEDICATIONS:  chlorhexidine 0.12% Liquid 15 milliLiter(s) Oral Mucosa every 12 hours  chlorhexidine 2% Cloths 1 Application(s) Topical <User Schedule>      CODE STATUS: full code

## 2024-11-16 NOTE — OCCUPATIONAL THERAPY INITIAL EVALUATION ADULT - GENERAL OBSERVATIONS, REHAB EVAL
Pt received seated in bedside chair, +SWAN, +L radial Marian, +ALYSSA x3, +nichols, +IV, +ICU monitoring

## 2024-11-16 NOTE — OCCUPATIONAL THERAPY INITIAL EVALUATION ADULT - LIVES WITH, PROFILE
Pt lives with wife and two children in private home, 5 steps to enter, +flight to bedroom, walk in shower with chair. Pt I in ADLs and ambulation prior to admission

## 2024-11-16 NOTE — PROGRESS NOTE ADULT - SUBJECTIVE AND OBJECTIVE BOX
Transplant Surgery - Multidisciplinary Progress Note  --------------------------------------------------------------   Donor OLTx      Date: 11/15/2024        POD#1    Present: Patient seen and examined with multidisciplinary Transplant team including (Surgery: . Hepatologist:  RNs in AM rounds. Disciplines not in attendance will be notified of the plan.       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    Interval Events:  - extubated  - 2L O2 NC  - post op US with no significant abnormalities  - initially hypotensive, improving with 750 cc 5% albumin  - insulin gtt dc'ed      Immunosuppression:      Potential Discharge date: Pending clinical course    Education: Medications    Plan of care: See Below    MEDICATIONS  (STANDING):  ampicillin/sulbactam  IVPB 3 Gram(s) IV Intermittent every 6 hours  chlorhexidine 2% Cloths 1 Application(s) Topical <User Schedule>  fluconAZOLE IVPB 400 milliGRAM(s) IV Intermittent daily  insulin lispro (ADMELOG) corrective regimen sliding scale   SubCutaneous every 4 hours  methylPREDNISolone sodium succinate IVPB   IV Intermittent   methylPREDNISolone sodium succinate IVPB 250 milliGRAM(s) IV Intermittent every 24 hours  metoprolol tartrate 50 milliGRAM(s) Oral once  multiple electrolytes Injection Type 1 1000 milliLiter(s) (125 mL/Hr) IV Continuous <Continuous>  mycophenolate mofetil Suspension 1000 milliGRAM(s) Enteral Tube <User Schedule>  pantoprazole  Injectable 40 milliGRAM(s) IV Push daily  senna Syrup 10 milliLiter(s) Oral at bedtime  trimethoprim  40 mG/sulfamethoxazole 200 mG Suspension 10 milliLiter(s) Enteral Tube daily  valGANciclovir 50 mG/mL Oral Solution 450 milliGRAM(s) Oral daily    MEDICATIONS  (PRN):  HYDROmorphone  Injectable 0.5 milliGRAM(s) IV Push every 3 hours PRN Severe Pain (7 - 10)      PAST MEDICAL & SURGICAL HISTORY:  DM (diabetes mellitus)      HTN (hypertension)      Paroxysmal atrial fibrillation      Cirrhosis      HCC (hepatocellular carcinoma)      History of BPH          Vital Signs Last 24 Hrs  T(C): 37.9 (15 Nov 2024 17:58), Max: 37.9 (15 Nov 2024 17:58)  T(F): 100.2 (15 Nov 2024 17:58), Max: 100.2 (15 Nov 2024 17:58)  HR: 88 (2024 04:00) (63 - 88)  BP: 147/73 (15 Nov 2024 06:29) (133/65 - 147/73)  BP(mean): --  RR: 14 (2024 04:00) (7 - 27)  SpO2: 98% (2024 04:00) (96% - 100%)    Parameters below as of 15 Nov 2024 23:07      O2 Concentration (%): 40    I&O's Summary    2024 07:01  -  15 Nov 2024 07:00  --------------------------------------------------------  IN: 285 mL / OUT: 300 mL / NET: -15 mL    15 Nov 2024 07:01  -  2024 05:03  --------------------------------------------------------  IN: 3847.2 mL / OUT: 940 mL / NET: 2907.2 mL                              8.4    5.28  )-----------( 50       ( 15 Nov 2024 22:57 )             26.9     11-15    141  |  110[H]  |  17  ----------------------------<  102[H]  3.4[L]   |  23  |  0.54    Ca    7.6[L]      15 Nov 2024 22:57  Phos  2.4     11-15  Mg     2.1     11-15    TPro  4.2[L]  /  Alb  2.8[L]  /  TBili  1.1  /  DBili  x   /  AST  813[H]  /  ALT  527[H]  /  AlkPhos  102  11-15        Review of systems  All other systems were reviewed and are negative, except as noted.      PHYSICAL EXAM:  Constitutional: Well developed / well nourished  Eyes:  PERRLA  ENMT: nc/at, no thrush  Neck: supple, central line  Respiratory: CTA B/L  Cardiovascular: RRR  Gastrointestinal: Soft abdomen, ND, appropriate incisional TTP. chevron incision c/d/i, staples in place. No signs of infection.  JPs; #1&3 serosanguinous, #2 serous.  Genitourinary: Urinary catheter in place  Extremities: SCD's in place and working bilaterally  Neurological: A&O x3  Skin: no rashes, ulcerations, lesions  Psychiatric: Responsive     Transplant Surgery - Multidisciplinary Progress Note  --------------------------------------------------------------  OLT   11/15/2024        POD#1    Present: Patient seen and examined with multidisciplinary Transplant team including (Surgery: Dr. Vora, Dr. Spann, JAZZ Fisher, Hepatology: Dr. Delgado, SICU team during AM rounds. Disciplines not in attendance will be notified of the plan.       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    Interval Events:  - extubated  - TMax 100F so far, HTNsive  - good graft function  - AAOx3  -1u PRBC/1U PLT o/n, stable levels      Immunosuppression:  -FK TBD, MMF 1/1, SST  -SIMULECT #2 on POD#4  -ongoing monitoring for signs of rejection    Potential Discharge date: Pending clinical course    Education: Medications    Plan of care: See Below        MEDICATIONS  (STANDING):  ampicillin/sulbactam  IVPB 3 Gram(s) IV Intermittent every 6 hours  chlorhexidine 2% Cloths 1 Application(s) Topical <User Schedule>  fluconAZOLE   Tablet 400 milliGRAM(s) Oral daily  insulin glargine Injectable (LANTUS) 18 Unit(s) SubCutaneous at bedtime  insulin lispro (ADMELOG) corrective regimen sliding scale   SubCutaneous every 6 hours  methylPREDNISolone sodium succinate IVPB   IV Intermittent   methylPREDNISolone sodium succinate IVPB 250 milliGRAM(s) IV Intermittent every 24 hours  metoprolol tartrate 50 milliGRAM(s) Oral every 12 hours  mycophenolate mofetil 1000 milliGRAM(s) Oral <User Schedule>  pantoprazole    Tablet 40 milliGRAM(s) Oral before breakfast  polyethylene glycol 3350 17 Gram(s) Oral daily  senna 2 Tablet(s) Oral at bedtime  tamsulosin 0.4 milliGRAM(s) Oral at bedtime  trimethoprim   80 mG/sulfamethoxazole 400 mG 1 Tablet(s) Oral daily  valGANciclovir 450 milliGRAM(s) Oral daily    MEDICATIONS  (PRN):  HYDROmorphone  Injectable 0.5 milliGRAM(s) IV Push every 3 hours PRN Breakthrough Pain  oxyCODONE    IR 5 milliGRAM(s) Oral every 4 hours PRN Moderate Pain (4 - 6)  oxyCODONE    IR 10 milliGRAM(s) Oral every 4 hours PRN Severe Pain (7 - 10)      PAST MEDICAL & SURGICAL HISTORY:  DM (diabetes mellitus)      HTN (hypertension)      Paroxysmal atrial fibrillation      Cirrhosis      HCC (hepatocellular carcinoma)      History of BPH          Vital Signs Last 24 Hrs  T(C): 37.8 (16 Nov 2024 15:00), Max: 37.8 (16 Nov 2024 08:00)  T(F): 100 (16 Nov 2024 15:00), Max: 100 (16 Nov 2024 08:00)  HR: 77 (16 Nov 2024 18:00) (69 - 98)  BP: 189/86 (16 Nov 2024 10:00) (183/86 - 192/88)  BP(mean): 124 (16 Nov 2024 10:00) (123 - 127)  RR: 19 (16 Nov 2024 18:00) (7 - 30)  SpO2: 94% (16 Nov 2024 18:00) (93% - 100%)    Parameters below as of 16 Nov 2024 15:00  Patient On (Oxygen Delivery Method): room air    O2 Concentration (%): 21    I&O's Summary    15 Nov 2024 07:01  -  16 Nov 2024 07:00  --------------------------------------------------------  IN: 4722.2 mL / OUT: 1330 mL / NET: 3392.2 mL    16 Nov 2024 07:01  -  16 Nov 2024 18:24  --------------------------------------------------------  IN: 730 mL / OUT: 1875 mL / NET: -1145 mL                              11.2   20.66 )-----------( 151      ( 16 Nov 2024 17:28 )             35.7     11-16    138  |  104  |  31[H]  ----------------------------<  285[H]  4.3   |  22  |  0.62    Ca    8.1[L]      16 Nov 2024 17:28  Phos  3.2     11-16  Mg     2.0     11-16    TPro  5.1[L]  /  Alb  3.4  /  TBili  1.6[H]  /  DBili  x   /  AST  226[H]  /  ALT  417[H]  /  AlkPhos  126[H]  11-16          Culture - Body Fluid with Gram Stain (collected 11-15-24 @ 20:13)  Source: Body Fluid  Gram Stain (11-16-24 @ 06:12):    No polymorphonuclear leukocytes seen    No organisms seen    by cytocentrifuge                Review of systems  All other systems were reviewed and are negative, except as noted.      PHYSICAL EXAM:  Constitutional: Well developed / well nourished  Eyes:  PERRLA  ENMT: nc/at, no thrush  Neck: supple, central line  Respiratory: CTA B/L  Cardiovascular: RRR  Gastrointestinal: Soft abdomen, ND, appropriate incisional TTP. chevron incision c/d/i, staples in place. No signs of infection.  [id number] ss  Genitourinary: Urinary catheter in place  Extremities: SCD's in place and working bilaterally  Neurological: A&O x3  Skin: no rashes, ulcerations, lesions  Psychiatric: Responsive

## 2024-11-16 NOTE — PHYSICAL THERAPY INITIAL EVALUATION ADULT - DID THE PATIENT HAVE SURGERY?
OLT/yes S/P Orthotopic open liver transplantation from a  donor under steroids and simulect and methylprednisone induction./yes

## 2024-11-16 NOTE — OCCUPATIONAL THERAPY INITIAL EVALUATION ADULT - PERTINENT HX OF CURRENT PROBLEM, REHAB EVAL
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

## 2024-11-16 NOTE — OCCUPATIONAL THERAPY INITIAL EVALUATION ADULT - NS ASR FOLLOW COMMAND OT EVAL
100% of the time/able to follow multistep instructions 100% of the time/75% of the time/able to follow multistep instructions

## 2024-11-16 NOTE — OCCUPATIONAL THERAPY INITIAL EVALUATION ADULT - DIAGNOSIS, OT EVAL
Pt presents with post op pain, decreased strength, ADLs and functional mobiltiy Pt presents with post op pain, decreased strength, ADLs and functional mobility

## 2024-11-16 NOTE — PROGRESS NOTE ADULT - ASSESSMENT
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       PLAN:    Neuro:  - A&Ox4 at baseline  - Sedation while intubated with precedex  - Pain control: Dilaudid PRN    Resp:  - 2L O2 NC  - wean supplemental O2 as able for SpO2 >92%    CV:  - goal MAP > 65 mmHg  - not requiring pressors    GI:   - Diet: NPO  - NGT in place  - Senna  - Protonix for stress ulcer prophylaxis    Renal:  - Monitor I&Os and electrolytes w/ repletions as necessary  - Castro  - Plasmalyte @ 125 cc/hr    Heme:  - Monitor CBC and coags  - Hold chemical VTE prophylaxis  - SCDs     ID:   - Monitor for clinical evidence of active infection  - Monitor WBC, temperature, and procalcitonin  - Unasyn q6 x 48 hours  - transplant ppx with fluconazole, bactrim, valcyte    Endo:   - Monitor glucose  - insulin gtt started intra-op, now dc'ed  - post transplant steroid taper    Lines:  - PIV  - L radial A-line  - R fem central line    Disposition: SICU   73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       PLAN:    Neuro:  - A&Ox4 at baseline  - Sedation while intubated with precedex  - Pain control: Dilaudid PRN    Resp:  - 2L O2 NC  - wean supplemental O2 as able for SpO2 >92%    CV:  - goal MAP > 65 mmHg  - not requiring pressors  - 50 Metoprolol x 1 dose for HTN    GI:   - Diet: NPO  - NGT in place  - Senna  - Protonix for stress ulcer prophylaxis    Renal:  - Monitor I&Os and electrolytes w/ repletions as necessary  - Castro  - Plasmalyte @ 125 cc/hr    Heme:  - Monitor CBC and coags  - Hold chemical VTE prophylaxis  - SCDs     ID:   - Monitor for clinical evidence of active infection  - Monitor WBC, temperature, and procalcitonin  - Unasyn q6 x 48 hours  - transplant ppx with fluconazole, bactrim, valcyte    Endo:   - Monitor glucose  - insulin gtt started intra-op, now dc'ed  - post transplant steroid taper    Lines:  - PIV  - L radial A-line  - R fem central line    Disposition: SICU

## 2024-11-16 NOTE — PROGRESS NOTE ADULT - ASSESSMENT
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 Post-op ultrasound with patent vessels.     Plan:  - Pain control PRN  - F/u serial labs  - Maintain NGT  - Appreciate excellent care per SICU. 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      Immunosuppression  -FK TBD, MMF 1/1, SST  -SIMULECT #2 on POD#4  -PPx: Valcyte/Bactrim/PPI/OsCal/Fluc    HTN  -Metoprolol, Nifedipine, adjust prn. Avoid overtreating in a post-op patient    DM  -Lantus/Lispro, adjust prn  -Endocrine as needed given steroids

## 2024-11-17 NOTE — PROGRESS NOTE ADULT - ASSESSMENT
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     PLAN:    Neuro:  - A&Ox4 at baseline  - Sedation while intubated with precedex  - Pain control: Dilaudid PRN and Oxy     Resp:  - room air  - wean supplemental O2 as able for SpO2 >92%    CV:  - goal MAP > 65 mmHg  - not requiring pressors  - Metoprolol 50 BID  - Proc    GI:   - Diet: NPO  - NGT in place  - Senna  - Protonix for stress ulcer prophylaxis    Renal:  - Monitor I&Os and electrolytes w/ repletions as necessary  - Castro  - flomax     Heme:  - Monitor CBC and coags  - Hold chemical VTE prophylaxis  - SCDs     ID:   - Monitor for clinical evidence of active infection  - Monitor WBC, temperature, and procalcitonin  - Unasyn q6 x 48 hours  - transplant ppx with fluconazole, bactrim, valcyte    Endo:   - Monitor glucose  - ISS  - insulin gtt started intra-op, now dc'ed  - post transplant steroid taper      Code Status:    Disposition:    Nadia Taveras PA-C     [id number] ASSESSMENT: 73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     PLAN:    Neuro:  - A&Ox4 at baseline  - Sedation while intubated with precedex  - Pain control: Dilaudid PRN and Oxy     Resp:  - room air  - wean supplemental O2 as able for SpO2 >92%    CV:  - goal MAP > 65 mmHg  - not requiring pressors  - Metoprolol 50 BID  - Proc    GI:   - Diet: NPO  - NGT in place  - Senna/ Miralax   - Protonix for stress ulcer prophylaxis    Renal:  - Monitor I&Os and electrolytes w/ repletions as necessary  - Castro  - flomax     Heme:  - Monitor CBC and coags  - Hold chemical VTE prophylaxis  - SCDs     ID:   - Monitor for clinical evidence of active infection  - Monitor WBC, temperature, and procalcitonin  - Unasyn q6 x 48 hours  - transplant ppx with fluconazole, bactrim, valcyte    Endo:   - Monitor glucose  - ISS  - lantus 18u   - post transplant steroid taper      Code Status: full code     Disposition: SICU    Nadia Taveras PA-C     [id number]

## 2024-11-17 NOTE — PROGRESS NOTE ADULT - ASSESSMENT
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      Immunosuppression  -FK TBD, MMF 1/1, SST  -SIMULECT #2 on POD#4  -PPx: Valcyte/Bactrim/PPI/OsCal/Fluc    HTN  -Metoprolol, Nifedipine, adjust prn. 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 (splenomegaly, recanalized paraumbilical vein, paraoesophageal and tera splenic varices), and with HCC found on 9/11/23 MRI, 1.8 cm seg 5 LR-5 HCC and a 3-4 cm seg 8 LR 4 HCC s/p Y90 Sept, 2023 with favorable treatment response, now s/p OLT on 11/15    s/p OLT (POD#2)  -good graft function with downtrending LFTs and good flow on dopplers  -d/c'ed NGT, CLD later if ok  -Lasix 20 IV x 1  -ASA 81 resumed  -Flomax started   -Adv diet to clears  -f/u CXR  -Cardiac workup; trops ordered   -d/c one A-line, d/c [place]  -PT/OT/SCDs/spirometry  -bowel regimen as able  -hold ASA/SQH  -leukocytosis and fever curve, likely reactive.      Immunosuppression  -FK TBD, MMF 1/1, SST  -SIMULECT #2 on POD#4  -PPx: Valcyte/Bactrim/PPI/OsCal/Fluc    HTN  -Metoprolol, Nifedipine, adjust prn. 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 (splenomegaly, recanalized paraumbilical vein, paraoesophageal and tera splenic varices), and with HCC found on 9/11/23 MRI, 1.8 cm seg 5 LR-5 HCC and a 3-4 cm seg 8 LR 4 HCC s/p Y90 Sept, 2023 with favorable treatment response, now s/p OLT on 11/15    s/p OLT (POD#2)  -good graft function with downtrending LFTs and good flow on dopplers  -d/c'ed NGT  -Lasix 20 IV x 1  -ASA 81 resumed  -Flomax started, d/c [place] in AM per pt request  -Adv diet to clears  -f/u CXR  -Cardiac workup; trops ordered, cards c/s  -PT/OT/SCDs/spirometry  -bowel regimen as able  -hold ASA/SQH  -leukocytosis and fever curve, likely reactive-- improving     Immunosuppression  -FK TBD, MMF 1/1, SST  -SIMULECT #2 on POD#4  -PPx: Valcyte/Bactrim/PPI/OsCal/Fluc    HTN  -Metoprolol, Nifedipine, adjust prn. Avoid overtreating in a post-op patient    DM  -Lantus/Lispro, adjust prn  -Endocrine as needed given steroids

## 2024-11-17 NOTE — PROGRESS NOTE ADULT - SUBJECTIVE AND OBJECTIVE BOX
24 HOUR EVENTS:  - Nifedipine 60   - dc NGT  - IVL  - flomax  - lasix 20    NEURO  Exam: A&O x 4  Meds: HYDROmorphone  Injectable 1 milliGRAM(s) IV Push every 3 hours PRN Breakthrough Pain  melatonin 5 milliGRAM(s) Oral at bedtime  oxyCODONE    IR 5 milliGRAM(s) Oral every 4 hours PRN Moderate Pain (4 - 6)  oxyCODONE    IR 10 milliGRAM(s) Oral every 4 hours PRN Severe Pain (7 - 10)      RESPIRATORY  RR: 14 (11-17-24 @ 00:00) (7 - 30)  SpO2: 93% (11-17-24 @ 00:00) (92% - 100%)  Exam: room air   ABG - ( 17 Nov 2024 00:00 )  pH: 7.41  /  pCO2: 40    /  pO2: 122   / HCO3: 25    / Base Excess: 0.7   /  SaO2: 100.0           CARDIOVASCULAR  HR: 77 (11-17-24 @ 00:00) (77 - 98)  BP: 133/60 (11-16-24 @ 20:00) (133/60 - 192/88)  BP(mean): 87 (11-16-24 @ 20:00) (87 - 127)  ABP: 156/55 (11-17-24 @ 00:00) (129/64 - 193/63)  ABP(mean): 89 (11-17-24 @ 00:00) (81 - 117)      Exam:   Cardiac Rhythm:   Perfusion     [x]Adequate   [ ]Inadequate  Mentation   [x ]Normal       [ ]Reduced  Extremities  [x ]Warm         [ ]Cool  Volume Status [ ]Hypervolemic [ x]Euvolemic [ ]Hypovolemic  Meds: metoprolol tartrate 50 milliGRAM(s) Oral every 12 hours  NIFEdipine XL 60 milliGRAM(s) Oral daily      GI/NUTRITION  Exam: soft, ND, mildly tender   Diet: NPO  Meds: pantoprazole    Tablet 40 milliGRAM(s) Oral before breakfast  polyethylene glycol 3350 17 Gram(s) Oral daily  senna 2 Tablet(s) Oral at bedtime      GENITOURINARY  I&O's Detail    11-15 @ 07:01  -  11-16 @ 07:00  --------------------------------------------------------  IN:    Albumin 5%  - 250 mL: 750 mL    Dexmedetomidine: 92.4 mL    Enteral Tube Flush: 110 mL    Insulin: 20 mL    IV PiggyBack: 50 mL    IV PiggyBack: 499.8 mL    IV PiggyBack: 400 mL    multiple electrolytes Injection Type 1 Bolus: 1000 mL    multiple electrolytes Injection Type 1.: 1500 mL    PRBCs (Packed Red Blood Cells): 300 mL  Total IN: 4722.2 mL    OUT:    Bulb (mL): 135 mL    Bulb (mL): 185 mL    Bulb (mL): 225 mL    Indwelling Catheter - Urethral (mL): 785 mL  Total OUT: 1330 mL    Total NET: 3392.2 mL      11-16 @ 07:01  -  11-17 @ 01:30  --------------------------------------------------------  IN:    Enteral Tube Flush: 80 mL    IV PiggyBack: 450 mL    multiple electrolytes Injection Type 1.: 250 mL    Oral Fluid: 300 mL  Total IN: 1080 mL    OUT:    Bulb (mL): 80 mL    Bulb (mL): 675 mL    Bulb (mL): 55 mL    Indwelling Catheter - Urethral (mL): 1665 mL  Total OUT: 2475 mL    Total NET: -1395 mL          11-17    137  |  105  |  36[H]  ----------------------------<  212[H]  4.5   |  23  |  0.64    Ca    7.8[L]      17 Nov 2024 00:16  Phos  3.0     11-17  Mg     2.1     11-17    TPro  4.8[L]  /  Alb  3.2[L]  /  TBili  1.9[H]  /  DBili  x   /  AST  154[H]  /  ALT  349[H]  /  AlkPhos  141[H]  11-17    Meds: tamsulosin 0.4 milliGRAM(s) Oral at bedtime      HEMATOLOGIC  Meds:                         10.7   17.58 )-----------( 117      ( 17 Nov 2024 00:15 )             33.4     PT/INR - ( 17 Nov 2024 00:16 )   PT: 19.1 sec;   INR: 1.68 ratio         PTT - ( 17 Nov 2024 00:15 )  PTT:29.2 sec    INFECTIOUS DISEASES  T(C): 37.6 (11-16-24 @ 23:00), Max: 37.8 (11-16-24 @ 08:00)  Wt(kg): --  WBC Count: 17.58 K/uL (11-17 @ 00:15)  WBC Count: 20.66 K/uL (11-16 @ 17:28)  WBC Count: 20.36 K/uL (11-16 @ 11:19)  WBC Count: 13.31 K/uL (11-16 @ 05:16)    Recent Cultures:  Specimen Source: Body Fluid, 11-15 @ 20:13; Results --; Gram Stain:   No polymorphonuclear leukocytes seen  No organisms seen  by cytocentrifuge; Organism: --    Meds: ampicillin/sulbactam  IVPB 3 Gram(s) IV Intermittent every 6 hours  fluconAZOLE   Tablet 400 milliGRAM(s) Oral daily  mycophenolate mofetil 1000 milliGRAM(s) Oral <User Schedule>  trimethoprim   80 mG/sulfamethoxazole 400 mG 1 Tablet(s) Oral daily  valGANciclovir 450 milliGRAM(s) Oral daily      ENDOCRINE  Capillary Blood Glucose    Meds: insulin glargine Injectable (LANTUS) 18 Unit(s) SubCutaneous at bedtime  insulin lispro (ADMELOG) corrective regimen sliding scale   SubCutaneous every 6 hours      OTHER MEDICATIONS:  chlorhexidine 2% Cloths 1 Application(s) Topical <User Schedule>      IMAGING:

## 2024-11-17 NOTE — PROGRESS NOTE ADULT - SUBJECTIVE AND OBJECTIVE BOX
Transplant Surgery - Multidisciplinary Progress Note  --------------------------------------------------------------  OLT   11/15/2024        POD#2    Present: Patient seen and examined with multidisciplinary Transplant team including (Surgery: Dr. Vora, Dr. Spann, JAZZ Fisher, Hepatology: Dr. Delgado, SICU team during AM rounds. Disciplines not in attendance will be notified of the plan.       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    Interval Events:  - extubated  - TMax 100F so far, HTNsive  - good graft function  - AAOx3  -1u PRBC/1U PLT o/n, stable levels      Immunosuppression:  -FK TBD, MMF 1/1, SST  -SIMULECT #2 on POD#4  -ongoing monitoring for signs of rejection    Potential Discharge date: Pending clinical course    Education: Medications    Plan of care: See Below        MEDICATIONS  (STANDING):  ampicillin/sulbactam  IVPB 3 Gram(s) IV Intermittent every 6 hours  chlorhexidine 2% Cloths 1 Application(s) Topical <User Schedule>  fluconAZOLE   Tablet 400 milliGRAM(s) Oral daily  insulin glargine Injectable (LANTUS) 18 Unit(s) SubCutaneous at bedtime  insulin lispro (ADMELOG) corrective regimen sliding scale   SubCutaneous every 6 hours  methylPREDNISolone sodium succinate IVPB   IV Intermittent   methylPREDNISolone sodium succinate IVPB 250 milliGRAM(s) IV Intermittent every 24 hours  metoprolol tartrate 50 milliGRAM(s) Oral every 12 hours  mycophenolate mofetil 1000 milliGRAM(s) Oral <User Schedule>  pantoprazole    Tablet 40 milliGRAM(s) Oral before breakfast  polyethylene glycol 3350 17 Gram(s) Oral daily  senna 2 Tablet(s) Oral at bedtime  tamsulosin 0.4 milliGRAM(s) Oral at bedtime  trimethoprim   80 mG/sulfamethoxazole 400 mG 1 Tablet(s) Oral daily  valGANciclovir 450 milliGRAM(s) Oral daily    MEDICATIONS  (PRN):  HYDROmorphone  Injectable 0.5 milliGRAM(s) IV Push every 3 hours PRN Breakthrough Pain  oxyCODONE    IR 5 milliGRAM(s) Oral every 4 hours PRN Moderate Pain (4 - 6)  oxyCODONE    IR 10 milliGRAM(s) Oral every 4 hours PRN Severe Pain (7 - 10)      PAST MEDICAL & SURGICAL HISTORY:  DM (diabetes mellitus)      HTN (hypertension)      Paroxysmal atrial fibrillation      Cirrhosis      HCC (hepatocellular carcinoma)      History of BPH          Vital Signs Last 24 Hrs  T(C): 37.8 (16 Nov 2024 15:00), Max: 37.8 (16 Nov 2024 08:00)  T(F): 100 (16 Nov 2024 15:00), Max: 100 (16 Nov 2024 08:00)  HR: 77 (16 Nov 2024 18:00) (69 - 98)  BP: 189/86 (16 Nov 2024 10:00) (183/86 - 192/88)  BP(mean): 124 (16 Nov 2024 10:00) (123 - 127)  RR: 19 (16 Nov 2024 18:00) (7 - 30)  SpO2: 94% (16 Nov 2024 18:00) (93% - 100%)    Parameters below as of 16 Nov 2024 15:00  Patient On (Oxygen Delivery Method): room air    O2 Concentration (%): 21    I&O's Summary    15 Nov 2024 07:01  -  16 Nov 2024 07:00  --------------------------------------------------------  IN: 4722.2 mL / OUT: 1330 mL / NET: 3392.2 mL    16 Nov 2024 07:01  -  16 Nov 2024 18:24  --------------------------------------------------------  IN: 730 mL / OUT: 1875 mL / NET: -1145 mL                              11.2   20.66 )-----------( 151      ( 16 Nov 2024 17:28 )             35.7     11-16    138  |  104  |  31[H]  ----------------------------<  285[H]  4.3   |  22  |  0.62    Ca    8.1[L]      16 Nov 2024 17:28  Phos  3.2     11-16  Mg     2.0     11-16    TPro  5.1[L]  /  Alb  3.4  /  TBili  1.6[H]  /  DBili  x   /  AST  226[H]  /  ALT  417[H]  /  AlkPhos  126[H]  11-16          Culture - Body Fluid with Gram Stain (collected 11-15-24 @ 20:13)  Source: Body Fluid  Gram Stain (11-16-24 @ 06:12):    No polymorphonuclear leukocytes seen    No organisms seen    by cytocentrifuge                Review of systems  All other systems were reviewed and are negative, except as noted.      PHYSICAL EXAM:  Constitutional: Well developed / well nourished  Eyes:  PERRLA  ENMT: nc/at, no thrush  Neck: supple, central line  Respiratory: CTA B/L  Cardiovascular: RRR  Gastrointestinal: Soft abdomen, ND, appropriate incisional TTP. chevron incision c/d/i, staples in place. No signs of infection.  JPx3 ss  Genitourinary: Urinary catheter in place  Extremities: SCD's in place and working bilaterally  Neurological: A&O x3  Skin: no rashes, ulcerations, lesions  Psychiatric: Responsive     Transplant Surgery - Multidisciplinary Progress Note  --------------------------------------------------------------  OLT   11/15/2024        POD#2    Present: Patient seen and examined with multidisciplinary Transplant team including (Surgery: Dr. Vora, Dr. Spann, Ascencion Hernández Hepatology: Dr. Delgado, SICU team during AM rounds. Disciplines not in attendance will be notified of the plan.       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    Interval Events:  - extubated  - good graft function  -dc'ed NGT  -Lasix 20 IV x 1  -Nifedipine 60    Immunosuppression:  -FK TBD, MMF 1/1, SST  -SIMULECT #2 on POD#4  -ongoing monitoring for signs of rejection    Potential Discharge date: Pending clinical course    Education: Medications    Plan of care: See Below    MEDICATIONS  (STANDING):  albuterol/ipratropium for Nebulization 3 milliLiter(s) Nebulizer every 6 hours  aspirin  chewable 81 milliGRAM(s) Oral daily  buDESOnide    Inhalation Suspension 0.5 milliGRAM(s) Inhalation two times a day  chlorhexidine 2% Cloths 1 Application(s) Topical <User Schedule>  fluconAZOLE   Tablet 400 milliGRAM(s) Oral daily  insulin glargine Injectable (LANTUS) 18 Unit(s) SubCutaneous at bedtime  insulin lispro (ADMELOG) corrective regimen sliding scale   SubCutaneous three times a day with meals  insulin lispro (ADMELOG) corrective regimen sliding scale   SubCutaneous at bedtime  melatonin 5 milliGRAM(s) Oral at bedtime  metoprolol tartrate 50 milliGRAM(s) Oral every 12 hours  mycophenolate mofetil 1000 milliGRAM(s) Oral <User Schedule>  NIFEdipine XL 60 milliGRAM(s) Oral daily  pantoprazole    Tablet 40 milliGRAM(s) Oral before breakfast  polyethylene glycol 3350 17 Gram(s) Oral daily  senna 2 Tablet(s) Oral at bedtime  tamsulosin 0.4 milliGRAM(s) Oral at bedtime  trimethoprim   80 mG/sulfamethoxazole 400 mG 1 Tablet(s) Oral daily  ursodiol Capsule 300 milliGRAM(s) Oral two times a day  valGANciclovir 450 milliGRAM(s) Oral daily    MEDICATIONS  (PRN):  HYDROmorphone  Injectable 1 milliGRAM(s) IV Push every 3 hours PRN Breakthrough Pain  oxyCODONE    IR 5 milliGRAM(s) Oral every 4 hours PRN Moderate Pain (4 - 6)  oxyCODONE    IR 10 milliGRAM(s) Oral every 4 hours PRN Severe Pain (7 - 10)    PAST MEDICAL & SURGICAL HISTORY:  DM (diabetes mellitus)    HTN (hypertension)    Paroxysmal atrial fibrillation    Cirrhosis    HCC (hepatocellular carcinoma)    History of BPH    Vital Signs Last 24 Hrs  T(C): 37.3 (17 Nov 2024 15:00), Max: 37.8 (16 Nov 2024 19:00)  T(F): 99.1 (17 Nov 2024 15:00), Max: 100 (16 Nov 2024 19:00)  HR: 87 (17 Nov 2024 15:00) (70 - 93)  BP: 133/60 (16 Nov 2024 20:00) (133/60 - 133/60)  BP(mean): 87 (16 Nov 2024 20:00) (87 - 87)  RR: 14 (17 Nov 2024 15:00) (13 - 23)  SpO2: 96% (17 Nov 2024 15:00) (92% - 98%)    Parameters below as of 17 Nov 2024 03:00  Patient On (Oxygen Delivery Method): room air    I&O's Summary    16 Nov 2024 07:01  -  17 Nov 2024 07:00  --------------------------------------------------------  IN: 1300 mL / OUT: 2990 mL / NET: -1690 mL    17 Nov 2024 07:01  -  17 Nov 2024 17:57  --------------------------------------------------------  IN: 0 mL / OUT: 2295 mL / NET: -2295 mL                     10.4   16.60 )-----------( 114      ( 17 Nov 2024 13:46 )             32.9     11-17    132[L]  |  99  |  46[H]  ----------------------------<  328[H]  4.3   |  25  |  0.74    Ca    7.9[L]      17 Nov 2024 13:46  Phos  2.6     11-17  Mg     2.1     11-17    TPro  5.3[L]  /  Alb  3.3  /  TBili  1.8[H]  /  DBili  x   /  AST  83[H]  /  ALT  309[H]  /  AlkPhos  161[H]  11-17    Culture - Body Fluid with Gram Stain (collected 11-15-24 @ 20:13)  Source: Body Fluid  Gram Stain (11-16-24 @ 06:12):    No polymorphonuclear leukocytes seen    No organisms seen    by cytocentrifuge  Preliminary Report (11-17-24 @ 11:02):    No growth    Review of systems  All other systems were reviewed and are negative, except as noted.    PHYSICAL EXAM:  Constitutional: Well developed / well nourished  Eyes:  PERRLA  ENMT: nc/at, no thrush  Neck: supple, central line  Respiratory: CTA B/L  Cardiovascular: RRR  Gastrointestinal: Soft abdomen, ND, appropriate incisional TTP. chevron incision c/d/i, staples in place. No signs of infection.  JPx3 ss  Genitourinary: Urinary catheter in place  Extremities: SCD's in place and working bilaterally  Neurological: A&O x3  Skin: no rashes, ulcerations, lesions  Psychiatric: Responsive     Transplant Surgery - Multidisciplinary Progress Note  --------------------------------------------------------------  OLT   11/15/2024        POD#2    Present: Patient seen and examined with multidisciplinary Transplant team including (Surgery: Dr. Vora, Dr. Spann, Ascencion Hernández Hepatology: Dr. Delgado, SICU team during AM rounds. Disciplines not in attendance will be notified of the plan.       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    Interval Events:  -extubated  -good graft function  -dc'ed NGT, tolerating sips  -Lasix 20 IV x 1, good response  -Afebrile, Hypertension improving on Nifedipine/Metoprolol  -AAOx3  -L CP/shoulder pain, no SOB    Immunosuppression:  -FK TBD, MMF 1/1, SST  -SIMULECT #2 on POD#4  -ongoing monitoring for signs of rejection    Potential Discharge date: Pending clinical course    Education: Medications    Plan of care: See Below    MEDICATIONS  (STANDING):  albuterol/ipratropium for Nebulization 3 milliLiter(s) Nebulizer every 6 hours  aspirin  chewable 81 milliGRAM(s) Oral daily  buDESOnide    Inhalation Suspension 0.5 milliGRAM(s) Inhalation two times a day  chlorhexidine 2% Cloths 1 Application(s) Topical <User Schedule>  fluconAZOLE   Tablet 400 milliGRAM(s) Oral daily  insulin glargine Injectable (LANTUS) 18 Unit(s) SubCutaneous at bedtime  insulin lispro (ADMELOG) corrective regimen sliding scale   SubCutaneous three times a day with meals  insulin lispro (ADMELOG) corrective regimen sliding scale   SubCutaneous at bedtime  melatonin 5 milliGRAM(s) Oral at bedtime  metoprolol tartrate 50 milliGRAM(s) Oral every 12 hours  mycophenolate mofetil 1000 milliGRAM(s) Oral <User Schedule>  NIFEdipine XL 60 milliGRAM(s) Oral daily  pantoprazole    Tablet 40 milliGRAM(s) Oral before breakfast  polyethylene glycol 3350 17 Gram(s) Oral daily  senna 2 Tablet(s) Oral at bedtime  tamsulosin 0.4 milliGRAM(s) Oral at bedtime  trimethoprim   80 mG/sulfamethoxazole 400 mG 1 Tablet(s) Oral daily  ursodiol Capsule 300 milliGRAM(s) Oral two times a day  valGANciclovir 450 milliGRAM(s) Oral daily    MEDICATIONS  (PRN):  HYDROmorphone  Injectable 1 milliGRAM(s) IV Push every 3 hours PRN Breakthrough Pain  oxyCODONE    IR 5 milliGRAM(s) Oral every 4 hours PRN Moderate Pain (4 - 6)  oxyCODONE    IR 10 milliGRAM(s) Oral every 4 hours PRN Severe Pain (7 - 10)    PAST MEDICAL & SURGICAL HISTORY:  DM (diabetes mellitus)    HTN (hypertension)    Paroxysmal atrial fibrillation    Cirrhosis    HCC (hepatocellular carcinoma)    History of BPH    Vital Signs Last 24 Hrs  T(C): 37.3 (17 Nov 2024 15:00), Max: 37.8 (16 Nov 2024 19:00)  T(F): 99.1 (17 Nov 2024 15:00), Max: 100 (16 Nov 2024 19:00)  HR: 87 (17 Nov 2024 15:00) (70 - 93)  BP: 133/60 (16 Nov 2024 20:00) (133/60 - 133/60)  BP(mean): 87 (16 Nov 2024 20:00) (87 - 87)  RR: 14 (17 Nov 2024 15:00) (13 - 23)  SpO2: 96% (17 Nov 2024 15:00) (92% - 98%)    Parameters below as of 17 Nov 2024 03:00  Patient On (Oxygen Delivery Method): room air    I&O's Summary    16 Nov 2024 07:01  -  17 Nov 2024 07:00  --------------------------------------------------------  IN: 1300 mL / OUT: 2990 mL / NET: -1690 mL    17 Nov 2024 07:01  -  17 Nov 2024 17:57  --------------------------------------------------------  IN: 0 mL / OUT: 2295 mL / NET: -2295 mL                     10.4   16.60 )-----------( 114      ( 17 Nov 2024 13:46 )             32.9     11-17    132[L]  |  99  |  46[H]  ----------------------------<  328[H]  4.3   |  25  |  0.74    Ca    7.9[L]      17 Nov 2024 13:46  Phos  2.6     11-17  Mg     2.1     11-17    TPro  5.3[L]  /  Alb  3.3  /  TBili  1.8[H]  /  DBili  x   /  AST  83[H]  /  ALT  309[H]  /  AlkPhos  161[H]  11-17    Culture - Body Fluid with Gram Stain (collected 11-15-24 @ 20:13)  Source: Body Fluid  Gram Stain (11-16-24 @ 06:12):    No polymorphonuclear leukocytes seen    No organisms seen    by cytocentrifuge  Preliminary Report (11-17-24 @ 11:02):    No growth    Review of systems  All other systems were reviewed and are negative, except as noted.    PHYSICAL EXAM:  Constitutional: Well developed / well nourished  Eyes:  PERRLA  ENMT: nc/at, no thrush  Neck: supple, central line  Respiratory: CTA B/L  Cardiovascular: RRR  Gastrointestinal: Soft abdomen, ND, appropriate incisional TTP. chevron incision c/d/i, staples in place. No signs of infection.  JPx3 ss  Genitourinary: Urinary catheter in place  Extremities: SCD's in place and working bilaterally  Neurological: A&O x3  Skin: no rashes, ulcerations, lesions  Psychiatric: Responsive

## 2024-11-18 NOTE — BH CONSULTATION LIAISON ASSESSMENT NOTE - NSBHATTESTCOMMENTATTENDFT_PSY_A_CORE
74 year old, retired, domiciled, , male with PPHx of MDD with anxious distress, with no past psychiatric admissions, with PMHx of DM, HTN, pAfib s/p ablation 2018 (no AC 2/2 thrombocytopenia), CAD, BPH, likely MASH liver cirrhosis with portal htn, and with HCC found on 9/11/23 MRI, HCC s/p Y90 Sept, 2023 with favorable treatment response, now s/p OLT on 11/15. Patient seen by transplant psychiatry for concerns of anxiety post transplant.   --I personally was present and discussed this patient's case with the ACP at the time of the visit. I agree with the assessment and plan as written, unless noted below.   --    PPHx indicative of MDD in partial remission over three month duration in conjunction of SSRI use ( switch from Sertraline to Lexapro) for premature ejaculation. Given concerns of persistent MDD refractory to SSRI or Wellbutrin use patient's outpatient psychiatrist initiated Abilify as an adjunct. Given concerns of anxiety 2/2 ongoing Wellbutrin use would hold this medication; patient admits it was contributing to anxiety and "agitation." Patient continues to have concerns of sleep architecture issues with now worsening insomnia in ICU setting but no overt delirium. Can consider Remeron 7.5 mg PO HS. Please limit use of sedative hypnotics ( only use Ativan for severe anxiety briefly in setting of severe acute anxiety exacerbation) given risk of delirium. Risks benefits and alternatives to aforementioned medications discussed, patient voiced agreement and verbalized understanding of above plan.

## 2024-11-18 NOTE — DIETITIAN INITIAL EVALUATION ADULT - PERTINENT LABORATORY DATA
11-18    134[L]  |  100  |  44[H]  ----------------------------<  192[H]  3.8   |  24  |  0.63    Ca    7.8[L]      18 Nov 2024 06:16  Phos  2.5     11-18  Mg     2.2     11-18    TPro  5.1[L]  /  Alb  3.2[L]  /  TBili  2.7[H]  /  DBili  x   /  AST  92[H]  /  ALT  256[H]  /  AlkPhos  233[H]  11-18  POCT Blood Glucose.: 301 mg/dL (11-18-24 @ 11:34)

## 2024-11-18 NOTE — PROGRESS NOTE ADULT - NS ATTEND AMEND GEN_ALL_CORE FT
cx pain last night -- w/u for myocardial ischemia negative.  cardiology following.  duplex this AM.  labs essentially stable. otherwise good graft function.  ASA, SQH  d/c Matthew d/c JP3  immuno: tac 0.5mg BID, cellcept 1gm BID, steroid taper.

## 2024-11-18 NOTE — PROGRESS NOTE ADULT - ASSESSMENT
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  Stable perihepatic fluid collection measuring 4.5 x 1.9 x 3.4 cm  - d/c nichols, a-line, central line, and ALYSSA #3  -Adv diet toregular diet   -f/u CXR  -Cardiac workup; trops ordered: negative , cleared by CARDS  -PT/OT/SCDs/spirometry  -bowel regimen as able  -ASA/SQH restarted  -leukocytosis and fever curve, likely reactive-- improving   -Home psych medications restarted     Immunosuppression  -FK 0.5BID, MMF 1/1, SST  -SIMULECT #2 on POD#4  -PPx: Valcyte/Bactrim/PPI/OsCal/Fluc    HTN  -Metoprolol, Nifedipine, adjust prn. 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  Stable perihepatic fluid collection measuring 4.5 x 1.9 x 3.4 cm. downtrending by PM.  - d/c nichols, a-line, central line, and ALYSSA #3  -Adv diet to regular diet   -f/u CXR  -Cardiac workup; trops ordered: negative , cleared by CARDS  -PT/OT/SCDs/spirometry  -bowel regimen as able  -ASA/SQH restarted  -leukocytosis and fever curve, likely reactive-- improving   -Home psych medications restarted, Transplant Psych consulted    Immunosuppression  -FK 0.5BID started, follow levels, MMF 1/1, SST  -SIMULECT #2 on POD#4  -PPx: Valcyte/Bactrim/PPI/OsCal/Fluc    HTN/ pAFib  -Metoprolol, Nifedipine, adjust prn. Avoid overtreating in a post-op patient    DM  -Lantus/Lispro, adjust prn  -Endocrine as needed given steroids

## 2024-11-18 NOTE — CONSULT NOTE ADULT - ASSESSMENT
74 year-old with known coronary artery disease as above presents for liver transplant.  Seen to have chest pain post transplant.  It was atypical of angina and has resolved.  Troponin remained negative.    No further cardiovascular testing is necessary at this time.

## 2024-11-18 NOTE — BH CONSULTATION LIAISON ASSESSMENT NOTE - RISK ASSESSMENT
Risk factors: hx of low mood, chronic medical illness   Protective factors: no current active SI/HI/I/P or urges to harm self/others, no history of suicide attempts, no prior inpatient psych hospitalizations, responsibility to children/family identifies reasons for living, future orientation, intact social supports, fear of death or pain, cultural/spiritual/moral attitudes against suicide, compliance with psychiatric treatment and medications, positive therapeutic relationships, residential stability, no access to firearms, good insight into illness/need for treatment

## 2024-11-18 NOTE — DIETITIAN INITIAL EVALUATION ADULT - ADD RECOMMEND
1. Consistent carbohydrate diet   2. Ensure Max BID   3. [id number]   4. Monitor PO intake, diet tolerance, weight trends, labs, GI function, and skin integrity    Malorie Pike MS, RDN, CDN ([employer])

## 2024-11-18 NOTE — DIETITIAN INITIAL EVALUATION ADULT - ORAL INTAKE PTA/DIET HISTORY
PTA per pt  -Intake: good PO intake; denies changes in appetite   -Chewing/Swallowing: denies difficulty   -Allergies/Intolerances: NKFA

## 2024-11-18 NOTE — CONSULT NOTE ADULT - SUBJECTIVE AND OBJECTIVE BOX
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 Pt underwent Y90 Sept, 2023 with favorable treatment response.Pt completed OLTx evaluation and listed for OLTx 5/03/2024    (15 Nov 2024 00:03)    PMH:   DM (diabetes mellitus)    HTN (hypertension)    Paroxysmal atrial fibrillation    Cirrhosis    HCC (hepatocellular carcinoma)    History of BPH      PSH:     Medications:   albuterol/ipratropium for Nebulization 3 milliLiter(s) Nebulizer every 6 hours  aspirin enteric coated 81 milliGRAM(s) Oral daily  buDESOnide    Inhalation Suspension 0.5 milliGRAM(s) Inhalation two times a day  calcium carbonate 1250 mG  + Vitamin D (OsCal 500 + D) 1 Tablet(s) Oral two times a day  chlorhexidine 2% Cloths 1 Application(s) Topical <User Schedule>  dexMEDEtomidine Infusion 0.2 MICROgram(s)/kG/Hr IV Continuous <Continuous>  fluconAZOLE   Tablet 400 milliGRAM(s) Oral daily  heparin   Injectable 5000 Unit(s) SubCutaneous every 12 hours  HYDROmorphone  Injectable 1 milliGRAM(s) IV Push every 3 hours PRN  insulin glargine Injectable (LANTUS) 18 Unit(s) SubCutaneous at bedtime  insulin lispro (ADMELOG) corrective regimen sliding scale   SubCutaneous three times a day with meals  insulin lispro (ADMELOG) corrective regimen sliding scale   SubCutaneous at bedtime  insulin lispro Injectable (ADMELOG) 6 Unit(s) SubCutaneous three times a day before meals  lidocaine 1% Injectable 10 milliLiter(s) Local Injection once  melatonin 5 milliGRAM(s) Oral at bedtime  methylPREDNISolone sodium succinate Injectable   IV Push   metoprolol tartrate 50 milliGRAM(s) Oral every 12 hours  mirtazapine 7.5 milliGRAM(s) Oral at bedtime  mycophenolate mofetil 1000 milliGRAM(s) Oral <User Schedule>  NIFEdipine XL 60 milliGRAM(s) Oral daily  oxyCODONE    IR 5 milliGRAM(s) Oral every 4 hours PRN  oxyCODONE    IR 10 milliGRAM(s) Oral every 4 hours PRN  pantoprazole    Tablet 40 milliGRAM(s) Oral before breakfast  polyethylene glycol 3350 17 Gram(s) Oral daily  senna 2 Tablet(s) Oral at bedtime  tacrolimus 0.5 milliGRAM(s) Oral <User Schedule>  tamsulosin 0.4 milliGRAM(s) Oral at bedtime  trimethoprim   80 mG/sulfamethoxazole 400 mG 1 Tablet(s) Oral daily  ursodiol Capsule 300 milliGRAM(s) Oral two times a day  valGANciclovir 450 milliGRAM(s) Oral daily    Allergies:  No Known Allergies    FAMILY HISTORY:    Social History:  Smoking:  Alcohol:  Drugs:    Review of Systems:  [ ]Unable to obtain  Constitutional: No fever, chills, fatigue, or changes in weight  HEENT: No blurry vision, eye pain, headache, runny nose, or sore throat  Respiratory: No shortness of breath, cough, or wheezing  Cardiovascular: No chest pain or palpitations  Gastrointestinal: No nausea, vomiting, diarrhea, or abdominal pain  Genitourinary: No dysuria or incontinence  Extremities: No lower extremity swelling  Neurologic: No focal findings  Lymphatic: No lymphadenopathy   Skin: No rash  Psychiatry: No anxiety or depression  10 point review of systems is otherwise negative except as mentioned above            Physical Exam:  T(C): 36.9 (11-18-24 @ 23:00), Max: 37.5 (11-18-24 @ 12:00)  HR: 108 (11-18-24 @ 23:00) (74 - 129)  BP: 156/72 (11-18-24 @ 23:00) (127/62 - 169/79)  RR: 22 (11-18-24 @ 23:00) (12 - 26)  SpO2: 96% (11-18-24 @ 23:00) (92% - 99%)  Wt(kg): --    11-17 @ 07:01  -  11-18 @ 07:00  --------------------------------------------------------  IN: 490 mL / OUT: 3200 mL / NET: -2710 mL    11-18 @ 07:01  -  11-18 @ 23:29  --------------------------------------------------------  IN: 600 mL / OUT: 1420 mL / NET: -820 mL      Daily     Daily     Appearance: Normal, well groomed, NAD  Eyes: PERRLA, EOMI, pink conjunctiva, no scleral icterus   HENT: Normal oral mucosa  Cardiovascular: RRR, S1, S2, no murmur, rub, or gallop; no edema; no JVD  Procedural Access Site: Clean, dry, intact, without hematoma  Respiratory: Clear to auscultation bilaterally  Gastrointestinal: Soft, non-tender, non-distended, BS+  Musculoskeletal: No clubbing or joint deformity   Neurologic: No focal weakness  Lymphatic: No lymphadenopathy  Psychiatry: AAOx3 with appropriate mood and affect  Skin: No rashes, ecchymoses, or cyanosis    Cardiovascular Diagnostic Testing:  ECG: sinus rhythm    Echo: < from: Intra-Operative Transesophageal Echo W or WO Ultrasound Enhancing Agent (11.15.24 @ 07:46) >  --------------------------------------------------------------------------------  PRE-BYPASS FINDINGS     Left Ventricle:  Left ventricular ejection fraction is estimated at 60 to 65%. Normal left ventricularwall thickness. The left ventricular systolic function is normal There are no regional wall motion abnormalities seen.     Right Ventricle:  The right ventricular cavity is normal in size, with normal wall thickness and right ventricular systolic function is normal. Right ventricular systolic function is normal.     Left Atrium:  The left atrium is normal in size. No thrombus visualized in left atrial appendage.     Right Atrium:  The right atrium is normal in size. The right atrium is normal in size.     Interatrial Septum:  No PFO visualized with color flow doppler.     Aortic Valve:  The aortic valve appears trileaflet. There is no aortic valve stenosis. There is trace aortic regurgitation.     Mitral Valve:  There is no mitral valve stenosis. There is no mitral valve stenosis. There is trace mitral regurgitation.     Tricuspid Valve:  There is no evidence of tricuspid stenosis. There is trace tricuspid regurgitation.     Pericardium:  No pericardial effusion seen.     Post-Bypass:  S/P OLT. Under current loading conditions, hyperdynamic left ventricular systolic function, EF: 70-75% by visual estimate, no RWMA. Normal right ventricular systolic function. All other findings unchanged. Probe removed atraumatically, no blood. The patient tolerated the procedure well and without complications. Permanent recorded images are stored in the medical record.     Electronically signed by James Villareal on 11/15/2024 at 2:18:16 PM         *** Final ***    < end of copied text >      Stress Testing:     Cath: 4/24/2024, patient had his LHC repeated with Ben Villareal MD at [facility].  Cath showed multivessel coronary artery disease, but the lesions previously noted were FFR negative.  He continues to have MIRTA 3 flow throughout.    Interpretation of Telemetry:    Imaging:    Labs:                        11.0   15.25 )-----------( 145      ( 18 Nov 2024 17:27 )             34.7     11-18    135  |  99  |  44[H]  ----------------------------<  248[H]  4.1   |  24  |  0.65    Ca    8.1[L]      18 Nov 2024 17:27  Phos  2.4     11-18  Mg     2.3     11-18    TPro  5.6[L]  /  Alb  3.4  /  TBili  1.7[H]  /  DBili  x   /  AST  62[H]  /  ALT  237[H]  /  AlkPhos  229[H]  11-18    PT/INR - ( 18 Nov 2024 17:27 )   PT: 15.1 sec;   INR: 1.33 ratio         PTT - ( 18 Nov 2024 17:27 )  PTT:25.4 sec  CARDIAC MARKERS ( 18 Nov 2024 01:42 )  x     / x     / x     / x     / 3.4 ng/mL  CARDIAC MARKERS ( 17 Nov 2024 13:46 )  x     / x     / x     / x     / 4.1 ng/mL  CARDIAC MARKERS ( 17 Nov 2024 09:53 )  x     / x     / x     / x     / 4.8 ng/mL

## 2024-11-18 NOTE — BH CONSULTATION LIAISON ASSESSMENT NOTE - DESCRIPTION
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 Noted that he is domiciled with his family, and has 6 grandchildren.

## 2024-11-18 NOTE — PROGRESS NOTE ADULT - SUBJECTIVE AND OBJECTIVE BOX
STERLING BRYANT is a 74y Male s/p liver transplant on 11/15/24. PMH is signifcant for DM, HTN, afib, CAD, anxiety, BPH, MASH liver cirrhosis    NKDA  CMV Donor unknown/ Recipient + (intermediate risk)    Transplant Medications  Induction  -Basiliximab 20 mg POD 0 (given in OR) and POD 4  -Methyprednisolone taper (switch to PO prednisone on POD 6)            POD 0: 1000 mg IV in OR            POD 1: 500 mg IV x 1            POD 2: 250 mg IV x 1            POD 3: 125 mg IV x 1            POD 4: 60 mg IV x 1            POD 5: 40 mg IV x 1        Maintenance Immunosuppression  -Tacrolimus 0.05 mg/kg/dose Q12H at 8AM and 8PM (Adjust for goal trough: 8-10) to be started on POD 1 per liver transplant team  -Mycophenolate 1,000 mg PO/IV Q12H  -Prednisone             POD 6: 20 mg PO daily    Anti-infection   -Bactrim SS tablet (frequency based on renal function)  -Valganciclovir (dose based on CMV serostatus and frequency based on renal function)  -Fluconazole 400 mg daily    Surgical prophylaxis pre- and intra-operative dosing  -Ampicillin/sulbactam    Prophylaxis  -HAT ppx: aspirin 81 mg daily to start on POD3 per liver transplant team  -GI ppx: pantoprazole 40 mg IV daily (switch to PO when patient tolerates)  -Bowel ppx: senna  -DVT: sequential compression device  -Pain:            Mild: Acetaminophen 650 mg every 6 hours PRN           Moderate: Tramadol 25 mg every 4 hours PRN (adjust for renal function)           Severe: Tramadol 50 mg every 4 hours PRN (adjust for renal function)    Home Medications:  albuterol 2.5 mg/3 mL (0.083%) inhalation solution: 3 milliliter(s) by nebulizer every 6 hours (17 Nov 2024 18:47)  ARIPiprazole 2 mg oral tablet: 1 tab(s) orally (17 Nov 2024 18:48)  budesonide 0.5 mg/2 mL inhalation suspension: 2 milliliter(s) by nebulizer 2 times a day (17 Nov 2024 18:48)  buPROPion 150 mg/12 hours (SR) oral tablet, extended release: 1 tab(s) orally (17 Nov 2024 18:49)  escitalopram 20 mg oral tablet: 1 tab(s) orally (17 Nov 2024 18:49)  metoprolol succinate 100 mg oral capsule, extended release: 1 cap(s) orally once a day (at bedtime) (14 Nov 2024 22:11)  mirtazapine 7.5 mg oral tablet: 1 tab(s) orally once a day (at bedtime) (17 Nov 2024 18:49)  sertraline 100 mg oral tablet: 1 tab(s) orally (17 Nov 2024 18:50)      Outpatient medication reconciliation reviewed and will be re-started appropriately.  Plan discussed with multidisciplinary team.

## 2024-11-18 NOTE — DIETITIAN INITIAL EVALUATION ADULT - REASON FOR ADMISSION
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

## 2024-11-18 NOTE — PROGRESS NOTE ADULT - ATTENDING COMMENTS
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     AF RVR 130s overnight, no intervention as it was shortlived   LFTs increased slightly, repeat liver duplex done this AM     I/Os   UOP 2500  Net -2700    Neuro: AAOx4, oxy PRN  Resp: 1L NC for comfort   CV: metoprolol 50 BID, nifedipine, cards consulted no changes to plan  GI: tolerating clears, advancing to regular diet, PO PPI, bowel regimen. FU repeat liver duplex. Immunosuppression per hepatology  Renal: DC magdalena, lasix 20 IV   Heme: start DVT ppx, ASA 81  ID: fluc/bactrim/valcyte ppx, completed periop abx  Endo: increase lantus 18 --> 20  Lines: LAURENT nichols, LAURENT LRAL, DC RIJ cordis I have seen and examined this patient on multidisciplinary SICU rounds this morning. I have reviewed all new labs, imaging and reports. 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     24 hours events:   AF RVR 130s overnight, no intervention as it was shortlived   LFTs increased slightly, repeat liver duplex done this AM     I/Os   UOP 2500  Net -2700    Neuro: AAOx4, oxy PRN  Resp: 1L NC for comfort   CV: metoprolol 50 BID, nifedipine, cards consulted no changes to plan  GI: tolerating clears, advancing to regular diet, PO PPI, bowel regimen. FU repeat liver duplex. Immunosuppression per hepatology  Renal: DC nichols, lasix 20 IV   Heme: start DVT ppx, ASA 81  ID: fluc/bactrim/valcyte ppx, completed periop abx  Endo: increase lantus 18 --> 20  Lines: DC nichols, DC LRAL, DC RIJ cordis    The patient required critical care. Critical care time was indicated due to the patient's inherent instability and high risk for decompensation. E & M work was done by me in multiple 10-15 minute intervals over the course of the day in the ICU. I have coordinated care with multiple teams, and reviewed the medical record, consult notes, ICU flow sheets, cardiac monitoring, mechanical ventilation, medication record, brain and body imaging, and serial sequential labs.             Total time spent in the care of this patient today (excluding procedures): 40 minutes                    Over 50% of the total time was spent in discussion and coordination of care with consulting services, dietary and rehab services.      Whit Varela MD  Trauma and Critical Care

## 2024-11-18 NOTE — BH CONSULTATION LIAISON ASSESSMENT NOTE - SUMMARY
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 Patient seen by transplant psychiatry for concerns of anxiety post transplant.     Patient on exam noted a hx of low mood, anxiety secondary to recent health problems 3 months ago, with need for liver transplant, of which he has been followed by Dr. Hobbs outpatient for management for his mood and anxiety. He noted post operatively experiencing symptoms, anxiety, with anxious ruminations, difficulty with sleep as well as  with feelings of guilt regarding organ from decreased donor.  He denied overt symptoms of panic attacks, lara, AH/Vh/HI/SI, intent or plan. Patient educated at bedside regarding medication regiment, including recommendations for both acute anxiety alleviation as well as sleep. Patient verbalized agreement and understanding with plan proposed.     Plan  -Recommend to start Remeron 7.5mg PO HS standing for sleep, and anxiety  -Recommend to start Abilify 2mg PO daily QAM for mood  -Can consider starting Ativan 0.25mg PO BID PRN for SEVERE anxiety alleviation while in the ICU ONLY  -Please HOLD: Wellbutrin, Lexapro at this time.         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 Patient seen by transplant psychiatry for concerns of anxiety post transplant.     Patient on exam noted a hx of low mood, anxiety secondary to recent health problems 3 months ago, with need for liver transplant, of which he has been followed by Dr. Hobbs outpatient for management for his mood and anxiety. He noted post operatively experiencing symptoms, anxiety, with anxious ruminations, difficulty with sleep as well as  with feelings of guilt regarding organ from decreased donor.  He denied overt symptoms of panic attacks, lara, AH/Vh/HI/SI, intent or plan. Patient educated at bedside regarding medication regiment, including recommendations for both acute anxiety alleviation as well as sleep. Patient verbalized agreement and understanding with plan proposed.     Plan  -Recommend to start Remeron 7.5mg PO HS standing for sleep, and anxiety  -Recommend to start Abilify 2mg PO daily QAM for mood  -Can consider starting Ativan 0.25mg PO BID PRN for SEVERE anxiety alleviation while in the ICU ONLY ( Hold for worsening mental status or respiratory depression)  -Please HOLD: Wellbutrin, Lexapro at this time.

## 2024-11-18 NOTE — PROGRESS NOTE ADULT - SUBJECTIVE AND OBJECTIVE BOX
24 HOUR EVENTS:  - lasix 40  - ASA  - lantus 18    NEURO  RASS (if intubated): 		CAM ICU (if concern for delirium):  Exam: AOx4  Meds: HYDROmorphone  Injectable 1 milliGRAM(s) IV Push every 3 hours PRN Breakthrough Pain  melatonin 5 milliGRAM(s) Oral at bedtime  oxyCODONE    IR 5 milliGRAM(s) Oral every 4 hours PRN Moderate Pain (4 - 6)  oxyCODONE    IR 10 milliGRAM(s) Oral every 4 hours PRN Severe Pain (7 - 10)      RESPIRATORY  RR: 16 (11-18-24 @ 00:00) (12 - 26)  SpO2: 95% (11-18-24 @ 00:00) (93% - 98%)  Wt(kg): --  Exam: Lungs CTA b/l  Mechanical Ventilation:   ABG - ( 18 Nov 2024 00:04 )  pH: 7.44  /  pCO2: 42    /  pO2: 94    / HCO3: 28    / Base Excess: 3.9   /  SaO2: 98.2    Lactate: x                Meds: albuterol/ipratropium for Nebulization 3 milliLiter(s) Nebulizer every 6 hours  buDESOnide    Inhalation Suspension 0.5 milliGRAM(s) Inhalation two times a day      CARDIOVASCULAR  HR: 83 (11-18-24 @ 00:00) (70 - 101)  BP: 129/59 (11-17-24 @ 20:00) (129/59 - 129/59)  BP(mean): 85 (11-17-24 @ 20:00) (85 - 85)  ABP: 153/61 (11-18-24 @ 00:00) (124/51 - 153/61)  ABP(mean): 91 (11-18-24 @ 00:00) (75 - 91)  Wt(kg): --  CVP(cm H2O): --      Exam: Normal S1/S2 w/o murmurs or rubs  Cardiac Rhythm: sinus  Perfusion     [x ]Adequate   [ ]Inadequate  Mentation   [x ]Normal       [ ]Reduced  Extremities  [x ]Warm         [ ]Cool  Volume Status [ ]Hypervolemic [ x]Euvolemic [ ]Hypovolemic  Meds: metoprolol tartrate 50 milliGRAM(s) Oral every 12 hours  NIFEdipine XL 60 milliGRAM(s) Oral daily      GI/NUTRITION  Exam: abd non distended, non TTP  Diet: CLD  Last Bowel Movement: 14-Nov-2024 (11-14-24 @ 20:30)    Meds: pantoprazole    Tablet 40 milliGRAM(s) Oral before breakfast  polyethylene glycol 3350 17 Gram(s) Oral daily  senna 2 Tablet(s) Oral at bedtime  ursodiol Capsule 300 milliGRAM(s) Oral two times a day      GENITOURINARY  I&O's Detail    11-16 @ 07:01  -  11-17 @ 07:00  --------------------------------------------------------  IN:    Enteral Tube Flush: 80 mL    IV PiggyBack: 550 mL    multiple electrolytes Injection Type 1.: 250 mL    Oral Fluid: 420 mL  Total IN: 1300 mL    OUT:    Bulb (mL): 880 mL    Bulb (mL): 55 mL    Bulb (mL): 90 mL    Indwelling Catheter - Urethral (mL): 1965 mL  Total OUT: 2990 mL    Total NET: -1690 mL      11-17 @ 07:01 - 11-18 @ 00:47  --------------------------------------------------------  IN:    IV PiggyBack: 250 mL    Oral Fluid: 120 mL  Total IN: 370 mL    OUT:    Bulb (mL): 40 mL    Bulb (mL): 120 mL    Bulb (mL): 160 mL    Indwelling Catheter - Urethral (mL): 2105 mL  Total OUT: 2425 mL    Total NET: -2055 mL          11-17    135  |  101  |  45[H]  ----------------------------<  235[H]  4.1   |  25  |  0.68    Ca    7.9[L]      17 Nov 2024 18:43  Phos  2.2     11-17  Mg     2.2     11-17    TPro  5.2[L]  /  Alb  3.4  /  TBili  1.6[H]  /  DBili  x   /  AST  72[H]  /  ALT  286[H]  /  AlkPhos  153[H]  11-17    Meds: calcium carbonate 1250 mG  + Vitamin D (OsCal 500 + D) 1 Tablet(s) Oral two times a day  tamsulosin 0.4 milliGRAM(s) Oral at bedtime      HEMATOLOGIC  Meds: aspirin  chewable 81 milliGRAM(s) Oral daily                          10.6   17.35 )-----------( 127      ( 17 Nov 2024 18:43 )             32.6     PT/INR - ( 18 Nov 2024 00:17 )   PT: 16.7 sec;   INR: 1.46 ratio         PTT - ( 18 Nov 2024 00:17 )  PTT:26.3 sec    INFECTIOUS DISEASES  T(C): 37.2 (11-17-24 @ 23:00), Max: 37.6 (11-17-24 @ 19:00)  Wt(kg): --  WBC Count: 17.35 K/uL (11-17 @ 18:43)  WBC Count: 16.60 K/uL (11-17 @ 13:46)  WBC Count: 17.28 K/uL (11-17 @ 06:02)    Recent Cultures:  Specimen Source: Body Fluid, 11-15 @ 20:13; Results   No growth; Gram Stain:   No polymorphonuclear leukocytes seen  No organisms seen  by cytocentrifuge; Organism: --    Meds: fluconAZOLE   Tablet 400 milliGRAM(s) Oral daily  mycophenolate mofetil 1000 milliGRAM(s) Oral <User Schedule>  trimethoprim   80 mG/sulfamethoxazole 400 mG 1 Tablet(s) Oral daily  valGANciclovir 450 milliGRAM(s) Oral daily      ENDOCRINE  Capillary Blood Glucose    Meds: insulin glargine Injectable (LANTUS) 18 Unit(s) SubCutaneous at bedtime  insulin lispro (ADMELOG) corrective regimen sliding scale   SubCutaneous three times a day with meals  insulin lispro (ADMELOG) corrective regimen sliding scale   SubCutaneous at bedtime  methylPREDNISolone sodium succinate Injectable 125 milliGRAM(s) IV Push every 24 hours  methylPREDNISolone sodium succinate Injectable   IV Push       ACCESS DEVICES:  [ ] Peripheral IV  [ ] Central Venous Line		[ ] R	[ ] L	[ ] IJ	[ ] Fem	[ ] SC	Placed:   [ ] Arterial Line			[ ] R	[ ] L	[ ] Fem	[ ] Rad	[ ] Ax	Placed:   [ ] PICC:					[ ] Mediport  [ ] Urinary Catheter, Date Placed:   [ ] Necessity of urinary, arterial, and venous catheters discussed    OTHER MEDICATIONS:  chlorhexidine 2% Cloths 1 Application(s) Topical <User Schedule>      IMAGING:

## 2024-11-18 NOTE — DIETITIAN INITIAL EVALUATION ADULT - OTHER INFO
GI/Intake:   -Moderate PO intake thus far  -Last BM documented ; bowel regimen ordered (Miralax, Senna)   -Hx of GONZALES Cirrhosis with HCC; s/p OLT 11/15  -Cellcept and Simulect ordered for immunosuppression     Endo:   -Post-transplant steroid regimen (Prednisone, Solu-medrol)   -6 units short acting TID, 18 units long acting, sliding scale insulin     Resp:   -Extubated 11/15     Weight Hx:  -Current dosin pounds   -Denies any recent weight changes

## 2024-11-18 NOTE — BH CONSULTATION LIAISON ASSESSMENT NOTE - NSBHCHARTREVIEWINVESTIGATE_PSY_A_CORE FT
EKG 11/14      Ventricular Rate 81 BPM    Atrial Rate 81 BPM    P-R Interval 174 ms    QRS Duration 82 ms    Q-T Interval 390 ms    QTC Calculation(Bazett) 453 ms    P Axis 14 degrees    R Axis 0 degrees    T Axis -3 degrees    Diagnosis Line NORMAL SINUS RHYTHM  MINIMAL VOLTAGE CRITERIA FOR LVH, MAY BE NORMAL VARIANT ( R in aVL )  NONSPECIFIC T WAVE ABNORMALITY  ABNORMAL ECG  NO PREVIOUS ECGS AVAILABLE  Confirmed by Destiny Swartz ([id number]) on 11/17/2024 11:40:44 PM

## 2024-11-18 NOTE — PROGRESS NOTE ADULT - ASSESSMENT
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     PLAN:    Neuro:  - A&Ox4  - Pain control: oxy PRN    Resp:  - room air  - wean supplemental O2 as able for SpO2 >92%    CV:  - goal MAP > 65 mmHg  - not requiring pressors  - Metoprolol 50 BID  - nifed 60 qd    GI:   - Diet: NPO  - NGT in place  - Senna/ Miralax   - Protonix for stress ulcer prophylaxis    Renal:  - Monitor I&Os and electrolytes w/ repletions as necessary  - Castro can be dc in AM  - s/p lasix 40  - flomax     Heme:  - Monitor CBC and coags  - Hold chemical VTE prophylaxis  - ASA  - SCDs     ID:   - Monitor for clinical evidence of active infection  - Monitor WBC, temperature, and procalcitonin  - transplant ppx with fluconazole, bactrim, valcyte    Endo:   - Monitor glucose  - ISS  - lantus 18u   - post transplant steroid taper      Code Status: full code     Disposition: SICU    Les Wright PA-C  Surgical Intensive Care Unit  [phone]

## 2024-11-18 NOTE — BH CONSULTATION LIAISON ASSESSMENT NOTE - NSBHATTESTAPPAMEND_PSY_A_CORE
I have personally seen and examined this patient. I fully participated in the care of this patient. I have made amendments to the documentation where appropriate and otherwise agree with the history, physical exam, and plan as documented by the [facility]

## 2024-11-18 NOTE — BH CONSULTATION LIAISON ASSESSMENT NOTE - NSBHCHARTREVIEWLAB_PSY_A_CORE FT
10.6   17.25 )-----------( 135      ( 18 Nov 2024 06:14 )             33.6     11-18    134[L]  |  100  |  44[H]  ----------------------------<  192[H]  3.8   |  24  |  0.63    Ca    7.8[L]      18 Nov 2024 06:16  Phos  2.5     11-18  Mg     2.2     11-18    TPro  5.1[L]  /  Alb  3.2[L]  /  TBili  2.7[H]  /  DBili  x   /  AST  92[H]  /  ALT  256[H]  /  AlkPhos  233[H]  11-18

## 2024-11-18 NOTE — DIETITIAN INITIAL EVALUATION ADULT - REASON INDICATOR FOR ASSESSMENT
Seen for post-transplant follow up   Sources: Electronic Medical Record, Team (Rounds), Patient, Spouse at bedside

## 2024-11-18 NOTE — BH CONSULTATION LIAISON ASSESSMENT NOTE - NSSUICPROTFACT_PSY_ALL_CORE
Responsibility to children, family, or others/Identifies reasons for living/Supportive social network of family or friends/Positive therapeutic relationships/Jew beliefs

## 2024-11-18 NOTE — BH CONSULTATION LIAISON ASSESSMENT NOTE - HPI (INCLUDE ILLNESS QUALITY, SEVERITY, DURATION, TIMING, CONTEXT, MODIFYING FACTORS, ASSOCIATED SIGNS AND SYMPTOMS)
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 Patient seen by transplant psychiatry for concerns of anxiety post transplant.     Patient on exam was A/Ox4, calm, cooperative, noted that he initially began to seek help with anxiety 3 months ago after he was informed about the need for a liver transplant. Patient endorsed that for the last three months he has followed Dr. Spring Hobbs in [facility]. Prior to these last three months, patient denied seeing any psychiatrist or therapist. Patient endorsed that when he was told three months ago regarding the need for liver transplant, he began to experienced symptoms of anxiety, with anxious ruminations, that has impacted his ability to sleep, noting that he has experienced difficulty with middle insomnia. He mentioned experiencing symptoms of low mood, with increasing isolation during this time, noting that he would not go to [employer] at times, something he would do frequently prior to this episodes. In regards to his psychiatric regiment patient noted that, he was on Remeron 7.5mg PRN, Abilify 2mg to aid in anxiety, as well as Wellbutrin, noted that he was on Wellbutrin for 4 weeks, however stated he was experiencing difficulty anxiety, as well as difficulty having insurance covering the medication, of which he stated he was started on Lexapro. Patient noted Lexapro was chosen by his psychiatrist to help with premature ejaculation, endorsed that he has been experiencing symptoms of premature ejaculation even prior to his diagnoses of liver disease. Patient stated that has experienced anxiety post transplant, with thoughts of guilt in relationship to organ patient received from a  donor.  Patient denied any symptoms of panic attacks, lara, AH/VH/HI/SI, intent or plan. He endorsed that while seeing a psychiatrist patient has been coping with "talking with my colleagues, friends, and family." Patient attested to  ahx of social alcohol use, drinking 1 light beer every 4 weeks, he denied any other substance use hx. Patient educated at bedside regarding medication regiment, including recommendations for both acute anxiety alleviation as well as sleep. Patient verbalized agreement and understanding with plan proposed.

## 2024-11-18 NOTE — PROGRESS NOTE ADULT - SUBJECTIVE AND OBJECTIVE BOX
Transplant Surgery - Multidisciplinary Progress Note  --------------------------------------------------------------  OLT   11/15/2024        POD#3    Present: Patient seen and examined with multidisciplinary Transplant team including (Surgery: Dr. Vora, Dr. Spann, [facility] Hernández Hepatology: Dr. Delgado, SICU team during AM rounds. Disciplines not in attendance will be notified of the plan.       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    Interval Events:  -Afebrile, HTN improving nifedipine 60 QD  -A-fib w/ RVR overnight, resolved spontaneously   -AAOx3  -Elevated LFTs, liver doppler (11/18): patent hepatic vasculature with improved hepatic arterial velocity compared to 11/15.  Stable perihepatic fluid collection measuring 4.5 x 1.9 x 3.4 cm.  -L CP/shoulder pain (resolved); CARDS cleared  -Dc nichols, a-line, and central line  -Started tacro 0.5BID  -Will d/c ALYSSA 3    Immunosuppression:  -FK 0.5BID, MMF 1/1, SST  -SIMULECT #2 on POD#4  -ongoing monitoring for signs of rejection    Potential Discharge date: Pending clinical course    MEDICATIONS  (STANDING):  albuterol/ipratropium for Nebulization 3 milliLiter(s) Nebulizer every 6 hours  aspirin enteric coated 81 milliGRAM(s) Oral daily  buDESOnide    Inhalation Suspension 0.5 milliGRAM(s) Inhalation two times a day  calcium carbonate 1250 mG  + Vitamin D (OsCal 500 + D) 1 Tablet(s) Oral two times a day  chlorhexidine 2% Cloths 1 Application(s) Topical <User Schedule>  fluconAZOLE   Tablet 400 milliGRAM(s) Oral daily  insulin glargine Injectable (LANTUS) 18 Unit(s) SubCutaneous at bedtime  insulin lispro (ADMELOG) corrective regimen sliding scale   SubCutaneous three times a day with meals  insulin lispro (ADMELOG) corrective regimen sliding scale   SubCutaneous at bedtime  insulin lispro Injectable (ADMELOG) 6 Unit(s) SubCutaneous three times a day before meals  lidocaine 1% Injectable 10 milliLiter(s) Local Injection once  melatonin 5 milliGRAM(s) Oral at bedtime  methylPREDNISolone sodium succinate Injectable   IV Push   metoprolol tartrate 50 milliGRAM(s) Oral every 12 hours  mycophenolate mofetil 1000 milliGRAM(s) Oral <User Schedule>  NIFEdipine XL 60 milliGRAM(s) Oral daily  pantoprazole    Tablet 40 milliGRAM(s) Oral before breakfast  polyethylene glycol 3350 17 Gram(s) Oral daily  senna 2 Tablet(s) Oral at bedtime  tacrolimus 0.5 milliGRAM(s) Oral once  tacrolimus 0.5 milliGRAM(s) Oral <User Schedule>  tamsulosin 0.4 milliGRAM(s) Oral at bedtime  trimethoprim   80 mG/sulfamethoxazole 400 mG 1 Tablet(s) Oral daily  ursodiol Capsule 300 milliGRAM(s) Oral two times a day  valGANciclovir 450 milliGRAM(s) Oral daily    MEDICATIONS  (PRN):  HYDROmorphone  Injectable 1 milliGRAM(s) IV Push every 3 hours PRN Breakthrough Pain  oxyCODONE    IR 5 milliGRAM(s) Oral every 4 hours PRN Moderate Pain (4 - 6)  oxyCODONE    IR 10 milliGRAM(s) Oral every 4 hours PRN Severe Pain (7 - 10)    PAST MEDICAL & SURGICAL HISTORY:  DM (diabetes mellitus)    HTN (hypertension)    Paroxysmal atrial fibrillation    Cirrhosis    HCC (hepatocellular carcinoma)    History of BPH    Vital Signs Last 24 Hrs  T(C): 37.5 (18 Nov 2024 12:00), Max: 37.6 (17 Nov 2024 19:00)  T(F): 99.5 (18 Nov 2024 12:00), Max: 99.7 (17 Nov 2024 19:00)  HR: 101 (18 Nov 2024 14:00) (72 - 129)  BP: 127/62 (18 Nov 2024 14:00) (127/62 - 158/69)  BP(mean): 89 (18 Nov 2024 14:00) (85 - 99)  RR: 24 (18 Nov 2024 14:00) (12 - 26)  SpO2: 95% (18 Nov 2024 14:00) (93% - 98%)    Parameters below as of 18 Nov 2024 12:37  Patient On (Oxygen Delivery Method): nasal cannula  O2 Flow (L/min): 2    I&O's Summary    17 Nov 2024 07:01  -  18 Nov 2024 07:00  --------------------------------------------------------  IN: 490 mL / OUT: 3200 mL / NET: -2710 mL    18 Nov 2024 07:01  -  18 Nov 2024 14:32  --------------------------------------------------------  IN: 250 mL / OUT: 755 mL / NET: -505 mL               10.6   17.25 )-----------( 135      ( 18 Nov 2024 06:14 )             33.6     11-18    134[L]  |  100  |  44[H]  ----------------------------<  192[H]  3.8   |  24  |  0.63    Ca    7.8[L]      18 Nov 2024 06:16  Phos  2.5     11-18  Mg     2.2     11-18    TPro  5.1[L]  /  Alb  3.2[L]  /  TBili  2.7[H]  /  DBili  x   /  AST  92[H]  /  ALT  256[H]  /  AlkPhos  233[H]  11-18    Culture - Body Fluid with Gram Stain (collected 11-15-24 @ 20:13)  Source: Body Fluid  Gram Stain (11-16-24 @ 06:12):    No polymorphonuclear leukocytes seen    No organisms seen    by cytocentrifuge  Preliminary Report (11-17-24 @ 11:02):    No growth    Review of systems  All other systems were reviewed and are negative, except as noted.    PHYSICAL EXAM:  Constitutional: Well developed / well nourished  Eyes:  PERRLA  ENMT: nc/at, no thrush  Neck: supple, central line  Respiratory: CTA B/L  Cardiovascular: RRR  Gastrointestinal: Soft abdomen, ND, appropriate incisional TTP. chevron incision c/d/i, staples in place. No signs of infection.  JPx3 ss  Genitourinary: Urinary catheter in place  Extremities: SCD's in place and working bilaterally  Neurological: A&O x3  Skin: no rashes, ulcerations, lesions  Psychiatric: Responsive     Transplant Surgery - Multidisciplinary Progress Note  --------------------------------------------------------------  OLT   11/15/2024        POD#3    Present: Patient seen and examined with multidisciplinary Transplant team including (Surgery: Dr. Hutchinson, Dr. Spann, [facility] Hern[facility]dez Hepatology: Dr. Delgado, SICU team during AM rounds. Disciplines not in attendance will be notified of the plan.       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    Interval Events:  -Afebrile, HTN improving, on nifedipine 60 QD  -A-fib w/ RVR overnight, resolved spontaneously, on BB  -AAOx3  -L CP/shoulder pain (resolved); CARDS cleared  -mild transaminitis    Immunosuppression:  -FK to start today 0.5BID, MMF 1/1, SST  -SIMULECT #2 on POD#4  -ongoing monitoring for signs of rejection    Potential Discharge date: Pending clinical course    MEDICATIONS  (STANDING):  albuterol/ipratropium for Nebulization 3 milliLiter(s) Nebulizer every 6 hours  aspirin enteric coated 81 milliGRAM(s) Oral daily  buDESOnide    Inhalation Suspension 0.5 milliGRAM(s) Inhalation two times a day  calcium carbonate 1250 mG  + Vitamin D (OsCal 500 + D) 1 Tablet(s) Oral two times a day  chlorhexidine 2% Cloths 1 Application(s) Topical <User Schedule>  fluconAZOLE   Tablet 400 milliGRAM(s) Oral daily  insulin glargine Injectable (LANTUS) 18 Unit(s) SubCutaneous at bedtime  insulin lispro (ADMELOG) corrective regimen sliding scale   SubCutaneous three times a day with meals  insulin lispro (ADMELOG) corrective regimen sliding scale   SubCutaneous at bedtime  insulin lispro Injectable (ADMELOG) 6 Unit(s) SubCutaneous three times a day before meals  lidocaine 1% Injectable 10 milliLiter(s) Local Injection once  melatonin 5 milliGRAM(s) Oral at bedtime  methylPREDNISolone sodium succinate Injectable   IV Push   metoprolol tartrate 50 milliGRAM(s) Oral every 12 hours  mycophenolate mofetil 1000 milliGRAM(s) Oral <User Schedule>  NIFEdipine XL 60 milliGRAM(s) Oral daily  pantoprazole    Tablet 40 milliGRAM(s) Oral before breakfast  polyethylene glycol 3350 17 Gram(s) Oral daily  senna 2 Tablet(s) Oral at bedtime  tacrolimus 0.5 milliGRAM(s) Oral once  tacrolimus 0.5 milliGRAM(s) Oral <User Schedule>  tamsulosin 0.4 milliGRAM(s) Oral at bedtime  trimethoprim   80 mG/sulfamethoxazole 400 mG 1 Tablet(s) Oral daily  ursodiol Capsule 300 milliGRAM(s) Oral two times a day  valGANciclovir 450 milliGRAM(s) Oral daily    MEDICATIONS  (PRN):  HYDROmorphone  Injectable 1 milliGRAM(s) IV Push every 3 hours PRN Breakthrough Pain  oxyCODONE    IR 5 milliGRAM(s) Oral every 4 hours PRN Moderate Pain (4 - 6)  oxyCODONE    IR 10 milliGRAM(s) Oral every 4 hours PRN Severe Pain (7 - 10)    PAST MEDICAL & SURGICAL HISTORY:  DM (diabetes mellitus)    HTN (hypertension)    Paroxysmal atrial fibrillation    Cirrhosis    HCC (hepatocellular carcinoma)    History of BPH    Vital Signs Last 24 Hrs  T(C): 37.5 (18 Nov 2024 12:00), Max: 37.6 (17 Nov 2024 19:00)  T(F): 99.5 (18 Nov 2024 12:00), Max: 99.7 (17 Nov 2024 19:00)  HR: 101 (18 Nov 2024 14:00) (72 - 129)  BP: 127/62 (18 Nov 2024 14:00) (127/62 - 158/69)  BP(mean): 89 (18 Nov 2024 14:00) (85 - 99)  RR: 24 (18 Nov 2024 14:00) (12 - 26)  SpO2: 95% (18 Nov 2024 14:00) (93% - 98%)    Parameters below as of 18 Nov 2024 12:37  Patient On (Oxygen Delivery Method): nasal cannula  O2 Flow (L/min): 2    I&O's Summary    17 Nov 2024 07:01  -  18 Nov 2024 07:00  --------------------------------------------------------  IN: 490 mL / OUT: 3200 mL / NET: -2710 mL    18 Nov 2024 07:01  -  18 Nov 2024 14:32  --------------------------------------------------------  IN: 250 mL / OUT: 755 mL / NET: -505 mL               10.6   17.25 )-----------( 135      ( 18 Nov 2024 06:14 )             33.6     11-18    134[L]  |  100  |  44[H]  ----------------------------<  192[H]  3.8   |  24  |  0.63    Ca    7.8[L]      18 Nov 2024 06:16  Phos  2.5     11-18  Mg     2.2     11-18    TPro  5.1[L]  /  Alb  3.2[L]  /  TBili  2.7[H]  /  DBili  x   /  AST  92[H]  /  ALT  256[H]  /  AlkPhos  233[H]  11-18    Culture - Body Fluid with Gram Stain (collected 11-15-24 @ 20:13)  Source: Body Fluid  Gram Stain (11-16-24 @ 06:12):    No polymorphonuclear leukocytes seen    No organisms seen    by cytocentrifuge  Preliminary Report (11-17-24 @ 11:02):    No growth    Review of systems  All other systems were reviewed and are negative, except as noted.    PHYSICAL EXAM:  Constitutional: Well developed / well nourished  Eyes:  PERRLA  ENMT: nc/at, no thrush  Neck: supple, central line  Respiratory: CTA B/L  Cardiovascular: RRR  Gastrointestinal: Soft abdomen, ND, appropriate incisional TTP. chevron incision c/d/i, staples in place. No signs of infection.  JPx3 ss  Genitourinary: Urinary catheter in place  Extremities: SCD's in place and working bilaterally  Neurological: A&O x3  Skin: no rashes, ulcerations, lesions  Psychiatric: Responsive

## 2024-11-18 NOTE — BH CONSULTATION LIAISON ASSESSMENT NOTE - NSBHCHARTREVIEWVS_PSY_A_CORE FT
Vital Signs Last 24 Hrs  T(C): 37.5 (18 Nov 2024 12:00), Max: 37.6 (17 Nov 2024 19:00)  T(F): 99.5 (18 Nov 2024 12:00), Max: 99.7 (17 Nov 2024 19:00)  HR: 75 (18 Nov 2024 11:01) (72 - 129)  BP: 138/63 (18 Nov 2024 08:00) (129/59 - 138/63)  BP(mean): 91 (18 Nov 2024 08:00) (85 - 91)  RR: 19 (18 Nov 2024 11:00) (12 - 26)  SpO2: 94% (18 Nov 2024 11:01) (93% - 98%)    Parameters below as of 18 Nov 2024 12:00  Patient On (Oxygen Delivery Method): nasal cannula  O2 Flow (L/min): 2

## 2024-11-18 NOTE — DIETITIAN INITIAL EVALUATION ADULT - PERSON TAUGHT/METHOD
Provided education on post transplant nutrition therapy and food safety guidelines for transplant recipients. Discussed importance of thoroughly washing all fresh fruits/vegetables, importance of avoiding uncooked/raw/unpasteurized foods, avoiding pre-made deli/buffet/salad bar meals. Foods recommended as healthy well balanced diet and importance of adequate protein intakes for proper post-surgical healing discussed. Reviewed recommendations to avoid grapefruit, pomegranate and star fruit while taking immunosuppressant medication. Reviewed recommendations for moderate intake of sodium and carbohydrates with transplant medications. Reviewed effect of steroids on BG levels and importance of limiting concentrated sweets. Pt was receptive and expressed understanding. All questions answered. Provided nutrition handout: USDA Food Safety for Transplant Recipients booklet./verbal instruction/written material/patient instructed/spouse instructed

## 2024-11-18 NOTE — BH CONSULTATION LIAISON ASSESSMENT NOTE - CURRENT MEDICATION
MEDICATIONS  (STANDING):  albuterol/ipratropium for Nebulization 3 milliLiter(s) Nebulizer every 6 hours  aspirin enteric coated 81 milliGRAM(s) Oral daily  buDESOnide    Inhalation Suspension 0.5 milliGRAM(s) Inhalation two times a day  calcium carbonate 1250 mG  + Vitamin D (OsCal 500 + D) 1 Tablet(s) Oral two times a day  chlorhexidine 2% Cloths 1 Application(s) Topical <User Schedule>  fluconAZOLE   Tablet 400 milliGRAM(s) Oral daily  insulin glargine Injectable (LANTUS) 18 Unit(s) SubCutaneous at bedtime  insulin lispro (ADMELOG) corrective regimen sliding scale   SubCutaneous three times a day with meals  insulin lispro (ADMELOG) corrective regimen sliding scale   SubCutaneous at bedtime  insulin lispro Injectable (ADMELOG) 6 Unit(s) SubCutaneous three times a day before meals  melatonin 5 milliGRAM(s) Oral at bedtime  methylPREDNISolone sodium succinate Injectable   IV Push   metoprolol tartrate 50 milliGRAM(s) Oral every 12 hours  mycophenolate mofetil 1000 milliGRAM(s) Oral <User Schedule>  NIFEdipine XL 60 milliGRAM(s) Oral daily  pantoprazole    Tablet 40 milliGRAM(s) Oral before breakfast  polyethylene glycol 3350 17 Gram(s) Oral daily  senna 2 Tablet(s) Oral at bedtime  tamsulosin 0.4 milliGRAM(s) Oral at bedtime  trimethoprim   80 mG/sulfamethoxazole 400 mG 1 Tablet(s) Oral daily  ursodiol Capsule 300 milliGRAM(s) Oral two times a day  valGANciclovir 450 milliGRAM(s) Oral daily    MEDICATIONS  (PRN):  HYDROmorphone  Injectable 1 milliGRAM(s) IV Push every 3 hours PRN Breakthrough Pain  oxyCODONE    IR 5 milliGRAM(s) Oral every 4 hours PRN Moderate Pain (4 - 6)  oxyCODONE    IR 10 milliGRAM(s) Oral every 4 hours PRN Severe Pain (7 - 10)

## 2024-11-19 NOTE — PROGRESS NOTE ADULT - NS ATTEND AMEND GEN_ALL_CORE FT
afib w RVR overnight, given betablockers.    On ASA. Cards to c/s  q24hr labs  Immuno: tac 0.5mg BID, cellcept 1gm BID, steroid taper

## 2024-11-19 NOTE — PROGRESS NOTE ADULT - ASSESSMENT
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     PLAN:  Neuro:  - A&Ox4  - Pain control: oxy PRN  - started remeron and abilify per psych for anxiety  - xanax 0.5 QHS for sleep/anxiety  - precedex overnight 11/18-19    Resp:  - prn NC, wean to room air  - wean supplemental O2 as able for SpO2 >92%  - Home budesonide & duonebs    CV:  - goal MAP > 65 mmHg  - not requiring pressors  - Metoprolol 50 BID  - nifedipine 60 qd    GI:   - Rising LFTs, liver US with good arterial flow and stable fluid collection   - Diet: Consistent carb   - Senna/ Miralax   - Protonix PO  - ALYSSA x3, mointor output    Renal:  - Monitor I&Os and electrolytes w/ repletions as necessary  - voiding spontaneously   - flomax     Heme:  - Monitor CBC and coags  - SQH per transplant   - ASA 81mg qd  - SCDs     ID:   - Monitor for clinical evidence of active infection  - Monitor WBC, temperature, and procalcitonin  - transplant ppx with fluconazole, bactrim, valcyte  - immunosuppression per transplant     Endo:   - Monitor glucose  - lantus 18u, premeal 6u, ISS  - post transplant steroid taper    Lines/drains:   - RIJ triple lumen   - L radial A line   - PIVs     Code Status: full code     Disposition: SICU
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
## 2024-11-19 NOTE — PROGRESS NOTE ADULT - ASSESSMENT
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  Stable perihepatic fluid collection measuring 4.5 x 1.9 x 3.4 cm.   - regular diet   -Cardiac workup; trops ordered: negative , cleared by CARDS  -PT/OT/SCDs/spirometry  -bowel regimen as able  -ASA/SQH  -leukocytosis and fever curve, likely reactive-- improving   -Home psych medications restarted, Transplant Psych following     Immunosuppression  -FK per level, MMF 1/1, SST  -SIMULECT #2 on POD#4  -PPx: Valcyte/Bactrim/PPI/OsCal/Fluc    HTN/ pAFib  -Metoprolol, Nifedipine, adjust prn. 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  Stable perihepatic fluid collection measuring 4.5 x 1.9 x 3.4 cm.   - regular diet   -PT/OT/SCDs/spirometry  -bowel regimen as able  -ASA/SQH  -leukocytosis and fever curve, likely reactive-- improving   -Home psych medications restarted, Transplant Psych following; agitated, dc remeron/abilify, start seroquel    Immunosuppression  -FK per level, MMF 1/1, SST  -SIMULECT #2 on POD#4  -PPx: Valcyte/Bactrim/PPI/OsCal/Fluc    HTN/ pAFib  -Metoprolol, Nifedipine, adjust prn.   - Dr. Quintanilla aware, on diltazem gtt  - CT head neg    DM  -Lantus/Lispro, adjust prn  -Endocrine as needed given steroids

## 2024-11-19 NOTE — PROGRESS NOTE ADULT - ASSESSMENT
74 year-old with known coronary artery disease as above presents for liver transplant.  Seen to have chest pain post transplant.  It was atypical of angina and has resolved.  Troponin remained negative.    Now with atrial fibrillation with RVR (11/19) - diltiazem gtt  Head CT to evaluate for bleeding in setting of acute mental status change    Rate control  Anticoagulation when cleared by surgery  EP consult

## 2024-11-19 NOTE — PROGRESS NOTE ADULT - SUBJECTIVE AND OBJECTIVE BOX
HPI:  Patient seen and examined at bedside in SICU.  AFib with RVR and acute mental status change noted.    Review Of Systems:           Respiratory: No shortness of breath, cough, or wheezing  Cardiovascular: No chest pain or palpitations  10 point review of systems is otherwise negative except as mentioned above        Medications:  albuterol/ipratropium for Nebulization 3 milliLiter(s) Nebulizer every 6 hours PRN  aspirin enteric coated 81 milliGRAM(s) Oral daily  buDESOnide    Inhalation Suspension 0.5 milliGRAM(s) Inhalation two times a day  calcium carbonate 1250 mG  + Vitamin D (OsCal 500 + D) 1 Tablet(s) Oral two times a day  chlorhexidine 2% Cloths 1 Application(s) Topical <User Schedule>  dexMEDEtomidine Infusion 0.2 MICROgram(s)/kG/Hr IV Continuous <Continuous>  diltiazem Infusion 10 mG/Hr IV Continuous <Continuous>  fluconAZOLE   Tablet 400 milliGRAM(s) Oral daily  heparin   Injectable 5000 Unit(s) SubCutaneous every 12 hours  HYDROmorphone  Injectable 1 milliGRAM(s) IV Push every 3 hours PRN  insulin glargine Injectable (LANTUS) 18 Unit(s) SubCutaneous at bedtime  insulin lispro (ADMELOG) corrective regimen sliding scale   SubCutaneous three times a day with meals  insulin lispro (ADMELOG) corrective regimen sliding scale   SubCutaneous at bedtime  insulin lispro Injectable (ADMELOG) 10 Unit(s) SubCutaneous three times a day before meals  melatonin 5 milliGRAM(s) Oral at bedtime  methylPREDNISolone sodium succinate Injectable   IV Push   metoprolol tartrate 50 milliGRAM(s) Oral every 8 hours  mycophenolate mofetil Suspension 1000 milliGRAM(s) Oral <User Schedule>  NIFEdipine XL 60 milliGRAM(s) Oral daily  oxyCODONE    IR 10 milliGRAM(s) Oral every 4 hours PRN  oxyCODONE    IR 5 milliGRAM(s) Oral every 4 hours PRN  pantoprazole    Tablet 40 milliGRAM(s) Oral before breakfast  polyethylene glycol 3350 17 Gram(s) Oral daily  QUEtiapine 12.5 milliGRAM(s) Oral every 12 hours PRN  QUEtiapine 25 milliGRAM(s) Oral at bedtime  senna 2 Tablet(s) Oral at bedtime  tamsulosin 0.4 milliGRAM(s) Oral at bedtime  trimethoprim   80 mG/sulfamethoxazole 400 mG 1 Tablet(s) Oral daily  ursodiol Capsule 300 milliGRAM(s) Oral two times a day  valGANciclovir 450 milliGRAM(s) Oral daily    PAST MEDICAL & SURGICAL HISTORY:  DM (diabetes mellitus)      HTN (hypertension)      Paroxysmal atrial fibrillation      Cirrhosis      HCC (hepatocellular carcinoma)      History of BPH        Vitals:  T(C): 36.9 (11-19-24 @ 23:00), Max: 37.6 (11-19-24 @ 10:15)  HR: 92 (11-19-24 @ 23:00) (89 - 148)  BP: 114/59 (11-19-24 @ 23:00) (99/52 - 187/83)  BP(mean): 82 (11-19-24 @ 23:00) (71 - 118)  RR: 24 (11-19-24 @ 23:00) (18 - 37)  SpO2: 98% (11-19-24 @ 23:00) (92% - 100%)  Wt(kg): --  Daily     Daily   I&O's Summary    18 Nov 2024 07:01  -  19 Nov 2024 07:00  --------------------------------------------------------  IN: 821.6 mL / OUT: 2315 mL / NET: -1493.4 mL    19 Nov 2024 07:01  -  19 Nov 2024 23:21  --------------------------------------------------------  IN: 259.2 mL / OUT: 1225 mL / NET: -965.8 mL        Physical Exam:  Appearance: No acute distress; well appearing  Eyes: PERRL, EOMI, pink conjunctiva  HENT: Normal oral mucosa  Cardiovascular: irregular tachycardia  Respiratory: Clear to auscultation bilaterally  Gastrointestinal: soft, non-tender, non-distended with normal bowel sounds  Musculoskeletal: No clubbing; no joint deformity   Neurologic: slurred speech  Lymphatic: No lymphadenopathy  Psychiatry: AAOx3, mood & affect appropriate  Skin: No rashes, ecchymoses, or cyanosis                          10.5   15.56 )-----------( 120      ( 19 Nov 2024 06:06 )             32.6     11-19    132[L]  |  99  |  44[H]  ----------------------------<  151[H]  3.8   |  24  |  0.61    Ca    7.7[L]      19 Nov 2024 06:06  Phos  2.5     11-19  Mg     2.8     11-19    TPro  5.0[L]  /  Alb  3.3  /  TBili  1.7[H]  /  DBili  x   /  AST  49[H]  /  ALT  175[H]  /  AlkPhos  194[H]  11-19    PT/INR - ( 19 Nov 2024 06:06 )   PT: 15.5 sec;   INR: 1.36 ratio         PTT - ( 19 Nov 2024 06:06 )  PTT:25.2 sec  CARDIAC MARKERS ( 18 Nov 2024 01:42 )  x     / x     / x     / x     / 3.4 ng/mL      Cardiovascular Diagnostic Testing:  ECG: sinus rhythm    Echo: < from: Intra-Operative Transesophageal Echo W or WO Ultrasound Enhancing Agent (11.15.24 @ 07:46) >  --------------------------------------------------------------------------------  PRE-BYPASS FINDINGS     Left Ventricle:  Left ventricular ejection fraction is estimated at 60 to 65%. Normal left ventricularwall thickness. The left ventricular systolic function is normal There are no regional wall motion abnormalities seen.     Right Ventricle:  The right ventricular cavity is normal in size, with normal wall thickness and right ventricular systolic function is normal. Right ventricular systolic function is normal.     Left Atrium:  The left atrium is normal in size. No thrombus visualized in left atrial appendage.     Right Atrium:  The right atrium is normal in size. The right atrium is normal in size.     Interatrial Septum:  No PFO visualized with color flow doppler.     Aortic Valve:  The aortic valve appears trileaflet. There is no aortic valve stenosis. There is trace aortic regurgitation.     Mitral Valve:  There is no mitral valve stenosis. There is no mitral valve stenosis. There is trace mitral regurgitation.     Tricuspid Valve:  There is no evidence of tricuspid stenosis. There is trace tricuspid regurgitation.     Pericardium:  No pericardial effusion seen.     Post-Bypass:  S/P OLT. Under current loading conditions, hyperdynamic left ventricular systolic function, EF: 70-75% by visual estimate, no RWMA. Normal right ventricular systolic function. All other findings unchanged. Probe removed atraumatically, no blood. The patient tolerated the procedure well and without complications. Permanent recorded images are stored in the medical record.     Electronically signed by James Villareal on 11/15/2024 at 2:18:16 PM         *** Final ***    < end of copied text >      Stress Testing:     Cath: 4/24/2024, patient had his LHC repeated with Ben Villareal MD at [facility].  Cath showed multivessel coronary artery disease, but the lesions previously noted were FFR negative.  He continues to have MIRTA 3 flow throughout.    Interpretation of Telemetry: AFib with RVR    Imaging:

## 2024-11-19 NOTE — PROGRESS NOTE ADULT - ATTENDING COMMENTS
I have seen and examined this patient on multidisciplinary SICU rounds this morning. I have reviewed all new labs, imaging and reports. 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     24 hours events:   Repeat liver duplex with good flow, LFTs coming down  AF RVR 150s overnight, metop 5 IV, diltiazem 10mg, then 15mg IVP  Continues HR in 150s, starting diltiazem gtt   Passed TOV  Psych resumed home meds abilify and remeron and nightly benzo for sleep  No BM, increased miralax     I/Os   UOP 1300  Net -1500    Neuro: AAOx4, oxy PRN. Psych following, on home abilify and remeron, nightly benzo for sleep.  Resp: 1-2L NC for comfort, home nebs   CV: AF RVR. Increase metoprolol 50 BID --> TID, nifedipine 60, start diltiazem gtt  GI: tolerating regular diet, PO PPI, bowel regimen. Immunosuppression per hepatology  Renal: lasix 20 IV, voiding  Heme: SQH BID, ASA 81  ID: fluc/bactrim/valcyte ppx, completed periop abx  Endo: well controlled on lantus 18 with 6u premeal  Lines: PIVs    The patient required critical care. Critical care time was indicated due to the patient's inherent instability and high risk for decompensation. E & M work was done by me in multiple 10-15 minute intervals over the course of the day in the ICU. I have coordinated care with multiple teams, and reviewed the medical record, consult notes, ICU flow sheets, cardiac monitoring, mechanical ventilation, medication record, brain and body imaging, and serial sequential labs.             Total time spent in the care of this patient today (excluding procedures): 40 minutes                    Over 50% of the total time was spent in discussion and coordination of care with consulting services, dietary and rehab services.      Whit Varela MD  Trauma and Critical Care I have seen and examined this patient on multidisciplinary SICU rounds this morning. I have reviewed all new labs, imaging and reports. 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     24 hours events:   Repeat liver duplex with good flow, LFTs coming down  AF RVR 150s overnight, metop 5 IV, diltiazem 10mg, then 15mg IVP  Continues HR in 150s, starting diltiazem gtt   Passed TOV  Psych resumed home meds abilify and remeron and nightly benzo for sleep  No BM, increased miralax   Mental status change with anxiety, restlessness. Non focal. Plan for NCHCT    I/Os   UOP 1300  Net -1500    Neuro: anxieous/restless this AM. remains oriented but slow to answer questions. Non focal on exam. Plan for NCHCT to rule out organic cause thought I think most likely delirium. Oxy PRN. Psych following, on home abilify and remeron, nightly benzo for sleep.  Resp: 1-2L NC for comfort, home nebs   CV: AF RVR. Increase metoprolol 50 BID --> TID, nifedipine 60, start diltiazem gtt  GI: tolerating regular diet, PO PPI, bowel regimen. Immunosuppression per hepatology  Renal: lasix 20 IV, voiding  Heme: SQH BID, ASA 81  ID: fluc/bactrim/valcyte ppx, completed periop abx  Endo: well controlled on lantus 18 with 6u premeal  Lines: PIVs    The patient required critical care. Critical care time was indicated due to the patient's inherent instability and high risk for decompensation. E & M work was done by me in multiple 10-15 minute intervals over the course of the day in the ICU. I have coordinated care with multiple teams, and reviewed the medical record, consult notes, ICU flow sheets, cardiac monitoring, mechanical ventilation, medication record, brain and body imaging, and serial sequential labs.             Total time spent in the care of this patient today (excluding procedures): 40 minutes                    Over 50% of the total time was spent in discussion and coordination of care with consulting services, dietary and rehab services.      Whit Varela MD  Trauma and Critical Care

## 2024-11-19 NOTE — PROGRESS NOTE ADULT - SUBJECTIVE AND OBJECTIVE BOX
24 hour events:  - Advance diet to CC   - Liver US 11/18 - good arterial flow, stable perihepatic fluid collection  - s/p lasix 20, hold additional Lasix per transplant   - SQH started per transplant   - Cards evaluated for CP - continue aspirin   - Increase lantus 18u, 6u added premeal   - Remove A line, RIJ introducer, and nichols. Passed TOV  - intermittently delirious. Not getting adequate sleep, anxious. Started remeron QHS and abilify QD for anxiety. Xanax 0.5 given at bedtime. Precedex overnight   - no BM yet, increased miralax from QD to BID     SUBJECTIVE/ROS:  [ ] A ten-point review of systems was otherwise negative except as noted.  [ ] Due to altered mental status/intubation, subjective information were not able to be obtained from the patient. History was obtained, to the extent possible, from review of the chart and collateral sources of information.      NEURO  Exam: awake, alert, oriented. Intermittently confused  Meds: ARIPiprazole 2 milliGRAM(s) Oral daily  dexMEDEtomidine Infusion 0.2 MICROgram(s)/kG/Hr IV Continuous <Continuous>  HYDROmorphone  Injectable 1 milliGRAM(s) IV Push every 3 hours PRN Breakthrough Pain  melatonin 5 milliGRAM(s) Oral at bedtime  mirtazapine 7.5 milliGRAM(s) Oral at bedtime  oxyCODONE    IR 5 milliGRAM(s) Oral every 4 hours PRN Moderate Pain (4 - 6)  oxyCODONE    IR 10 milliGRAM(s) Oral every 4 hours PRN Severe Pain (7 - 10)  [x] Adequacy of sedation and pain control has been assessed and adjusted      RESPIRATORY  RR: 20 (11-19-24 @ 02:00) (12 - 26)  SpO2: 94% (11-19-24 @ 02:00) (92% - 99%)  Exam: unlabored, clear to auscultation bilaterally  Mechanical Ventilation:   ABG - ( 18 Nov 2024 06:02 )  pH: 7.44  /  pCO2: 40    /  pO2: 149   / HCO3: 27    / Base Excess: 2.8   /  SaO2: 99.2      [N/A] Extubation Readiness Assessed  Meds: albuterol/ipratropium for Nebulization 3 milliLiter(s) Nebulizer every 6 hours  buDESOnide    Inhalation Suspension 0.5 milliGRAM(s) Inhalation two times a day      CARDIOVASCULAR  HR: 102 (11-19-24 @ 02:00) (74 - 123)  BP: 141/66 (11-19-24 @ 02:00) (127/62 - 169/79)  BP(mean): 95 (11-19-24 @ 02:00) (88 - 113)  ABP: 134/52 (11-18-24 @ 12:00) (131/44 - 151/62)  ABP(mean): 78 (11-18-24 @ 12:00) (72 - 90)    Exam: regular rate and rhythm  Cardiac Rhythm: sinus  Perfusion     [x]Adequate   [ ]Inadequate  Mentation   [x]Normal       [ ]Reduced  Extremities  [x]Warm         [ ]Cool  Volume Status [ ]Hypervolemic [x]Euvolemic [ ]Hypovolemic  Meds: metoprolol tartrate 50 milliGRAM(s) Oral every 12 hours  NIFEdipine XL 60 milliGRAM(s) Oral daily      GI/NUTRITION  Exam: soft, nontender, nondistended  Diet: npo  Meds: pantoprazole    Tablet 40 milliGRAM(s) Oral before breakfast  polyethylene glycol 3350 17 Gram(s) Oral daily  senna 2 Tablet(s) Oral at bedtime  ursodiol Capsule 300 milliGRAM(s) Oral two times a day      GENITOURINARY  I&O's Detail    11-17 @ 07:01  -  11-18 @ 07:00  --------------------------------------------------------  IN:    IV PiggyBack: 250 mL    Oral Fluid: 240 mL  Total IN: 490 mL    OUT:    Bulb (mL): 270 mL    Bulb (mL): 380 mL    Bulb (mL): 55 mL    Indwelling Catheter - Urethral (mL): 2495 mL  Total OUT: 3200 mL    Total NET: -2710 mL      11-18 @ 07:01  -  11-19 @ 02:16  --------------------------------------------------------  IN:    Dexmedetomidine: 30.8 mL    IV PiggyBack: 250 mL    Oral Fluid: 350 mL  Total IN: 630.8 mL    OUT:    Bulb (mL): 35 mL    Bulb (mL): 330 mL    Bulb (mL): 185 mL    Indwelling Catheter - Urethral (mL): 435 mL    Voided (mL): 500 mL  Total OUT: 1485 mL    Total NET: -854.2 mL      11-18    135  |  99  |  44[H]  ----------------------------<  248[H]  4.1   |  24  |  0.65    Ca    8.1[L]      18 Nov 2024 17:27  Phos  2.4     11-18  Mg     2.3     11-18    TPro  5.6[L]  /  Alb  3.4  /  TBili  1.7[H]  /  DBili  x   /  AST  62[H]  /  ALT  237[H]  /  AlkPhos  229[H]  11-18    [ ] Nichols catheter, indication: N/A  Meds: calcium carbonate 1250 mG  + Vitamin D (OsCal 500 + D) 1 Tablet(s) Oral two times a day  tamsulosin 0.4 milliGRAM(s) Oral at bedtime      HEMATOLOGIC  Meds: aspirin enteric coated 81 milliGRAM(s) Oral daily  heparin   Injectable 5000 Unit(s) SubCutaneous every 12 hours    [x] VTE Prophylaxis                        11.0   15.25 )-----------( 145      ( 18 Nov 2024 17:27 )             34.7     PT/INR - ( 18 Nov 2024 17:27 )   PT: 15.1 sec;   INR: 1.33 ratio         PTT - ( 18 Nov 2024 17:27 )  PTT:25.4 sec      INFECTIOUS DISEASES  WBC Count: 15.25 K/uL (11-18 @ 17:27)  WBC Count: 17.25 K/uL (11-18 @ 06:14)    RECENT CULTURES:  Specimen Source: Body Fluid  Date/Time: 11-15 @ 20:13  Culture Results:   No growth  Gram Stain:   No polymorphonuclear leukocytes seen  No organisms seen  by cytocentrifuge  Organism: --    Meds: basiliximab  IVPB 20 milliGRAM(s) IV Intermittent <User Schedule>  fluconAZOLE   Tablet 400 milliGRAM(s) Oral daily  mycophenolate mofetil 1000 milliGRAM(s) Oral <User Schedule>  tacrolimus 0.5 milliGRAM(s) Oral <User Schedule>  trimethoprim   80 mG/sulfamethoxazole 400 mG 1 Tablet(s) Oral daily  valGANciclovir 450 milliGRAM(s) Oral daily      ENDOCRINE  CAPILLARY BLOOD GLUCOSE  POCT Blood Glucose.: 110 mg/dL (18 Nov 2024 21:24)  POCT Blood Glucose.: 229 mg/dL (18 Nov 2024 16:32)  POCT Blood Glucose.: 301 mg/dL (18 Nov 2024 11:34)  POCT Blood Glucose.: 192 mg/dL (18 Nov 2024 08:11)    Meds: insulin glargine Injectable (LANTUS) 18 Unit(s) SubCutaneous at bedtime  insulin lispro (ADMELOG) corrective regimen sliding scale   SubCutaneous three times a day with meals  insulin lispro (ADMELOG) corrective regimen sliding scale   SubCutaneous at bedtime  insulin lispro Injectable (ADMELOG) 6 Unit(s) SubCutaneous three times a day before meals  methylPREDNISolone sodium succinate Injectable 60 milliGRAM(s) IV Push every 24 hours  methylPREDNISolone sodium succinate Injectable   IV Push       CODE STATUS: full code

## 2024-11-19 NOTE — PROGRESS NOTE ADULT - SUBJECTIVE AND OBJECTIVE BOX
Transplant Surgery - Multidisciplinary Progress Note  --------------------------------------------------------------  OLT   11/15/2024        POD#4    Present: Patient seen and examined with multidisciplinary Transplant team including (Surgery: Dr. Hutchinson. Hepatology: Dr. Delgado, JAZZ Nguyễn, SICU team in AM rounds. Disciplines not in attendance will be notified of the plan.     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    Interval Events:  - Afebrile, VSS   - Repeat Liver dopp- patent vasc   - magdalena dc'd, passed TOV   - AAOx3    Immunosuppression:  -FK per level MMF 1/1, SST  -SIMULECT #2 on POD#4  -ongoing monitoring for signs of rejection    Potential Discharge date: Pending clinical course      MEDICATIONS  (STANDING):  albuterol/ipratropium for Nebulization 3 milliLiter(s) Nebulizer every 6 hours  ARIPiprazole 2 milliGRAM(s) Oral daily  aspirin enteric coated 81 milliGRAM(s) Oral daily  basiliximab  IVPB 20 milliGRAM(s) IV Intermittent <User Schedule>  buDESOnide    Inhalation Suspension 0.5 milliGRAM(s) Inhalation two times a day  calcium carbonate 1250 mG  + Vitamin D (OsCal 500 + D) 1 Tablet(s) Oral two times a day  chlorhexidine 2% Cloths 1 Application(s) Topical <User Schedule>  dexMEDEtomidine Infusion 0.2 MICROgram(s)/kG/Hr (4.68 mL/Hr) IV Continuous <Continuous>  fluconAZOLE   Tablet 400 milliGRAM(s) Oral daily  heparin   Injectable 5000 Unit(s) SubCutaneous every 12 hours  insulin glargine Injectable (LANTUS) 18 Unit(s) SubCutaneous at bedtime  insulin lispro (ADMELOG) corrective regimen sliding scale   SubCutaneous three times a day with meals  insulin lispro (ADMELOG) corrective regimen sliding scale   SubCutaneous at bedtime  insulin lispro Injectable (ADMELOG) 6 Unit(s) SubCutaneous three times a day before meals  lidocaine 1% Injectable 10 milliLiter(s) Local Injection once  melatonin 5 milliGRAM(s) Oral at bedtime  methylPREDNISolone sodium succinate Injectable 60 milliGRAM(s) IV Push every 24 hours  methylPREDNISolone sodium succinate Injectable   IV Push   metoprolol tartrate 50 milliGRAM(s) Oral every 12 hours  mirtazapine 7.5 milliGRAM(s) Oral at bedtime  mycophenolate mofetil 1000 milliGRAM(s) Oral <User Schedule>  NIFEdipine XL 60 milliGRAM(s) Oral daily  pantoprazole    Tablet 40 milliGRAM(s) Oral before breakfast  polyethylene glycol 3350 17 Gram(s) Oral daily  senna 2 Tablet(s) Oral at bedtime  tacrolimus 0.5 milliGRAM(s) Oral <User Schedule>  tamsulosin 0.4 milliGRAM(s) Oral at bedtime  trimethoprim   80 mG/sulfamethoxazole 400 mG 1 Tablet(s) Oral daily  ursodiol Capsule 300 milliGRAM(s) Oral two times a day  valGANciclovir 450 milliGRAM(s) Oral daily    MEDICATIONS  (PRN):  HYDROmorphone  Injectable 1 milliGRAM(s) IV Push every 3 hours PRN Breakthrough Pain  oxyCODONE    IR 5 milliGRAM(s) Oral every 4 hours PRN Moderate Pain (4 - 6)  oxyCODONE    IR 10 milliGRAM(s) Oral every 4 hours PRN Severe Pain (7 - 10)      PAST MEDICAL & SURGICAL HISTORY:  DM (diabetes mellitus)      HTN (hypertension)      Paroxysmal atrial fibrillation      Cirrhosis      HCC (hepatocellular carcinoma)      History of BPH          Vital Signs Last 24 Hrs  T(C): 36.9 (18 Nov 2024 23:00), Max: 37.5 (18 Nov 2024 12:00)  T(F): 98.5 (18 Nov 2024 23:00), Max: 99.5 (18 Nov 2024 12:00)  HR: 108 (18 Nov 2024 23:43) (74 - 129)  BP: 156/72 (18 Nov 2024 23:00) (127/62 - 169/79)  BP(mean): 103 (18 Nov 2024 23:00) (88 - 113)  RR: 22 (18 Nov 2024 23:00) (12 - 26)  SpO2: 95% (18 Nov 2024 23:43) (92% - 99%)    Parameters below as of 18 Nov 2024 23:43  Patient On (Oxygen Delivery Method): nasal cannula        I&O's Summary    17 Nov 2024 07:01  -  18 Nov 2024 07:00  --------------------------------------------------------  IN: 490 mL / OUT: 3200 mL / NET: -2710 mL    18 Nov 2024 07:01  -  19 Nov 2024 00:29  --------------------------------------------------------  IN: 604.7 mL / OUT: 1485 mL / NET: -880.3 mL                              11.0   15.25 )-----------( 145      ( 18 Nov 2024 17:27 )             34.7     11-18    135  |  99  |  44[H]  ----------------------------<  248[H]  4.1   |  24  |  0.65    Ca    8.1[L]      18 Nov 2024 17:27  Phos  2.4     11-18  Mg     2.3     11-18    TPro  5.6[L]  /  Alb  3.4  /  TBili  1.7[H]  /  DBili  x   /  AST  62[H]  /  ALT  237[H]  /  AlkPhos  229[H]  11-18          Culture - Body Fluid with Gram Stain (collected 11-15-24 @ 20:13)  Source: Body Fluid  Gram Stain (11-16-24 @ 06:12):    No polymorphonuclear leukocytes seen    No organisms seen    by cytocentrifuge  Preliminary Report (11-17-24 @ 11:02):    No growth          Review of systems  All other systems were reviewed and are negative, except as noted.    PHYSICAL EXAM:  Constitutional: Well developed / well nourished  Eyes:  PERRLA  ENMT: nc/at, no thrush  Neck: supple   Respiratory: CTA B/L  Cardiovascular: RRR  Gastrointestinal: Soft abdomen, ND, appropriate incisional TTP. chevron incision c/d/i, staples in place. No signs of infection.  JPx3 ss  Genitourinary: Voiding spontaneously   Extremities: SCD's in place and working bilaterally  Neurological: A&O x3  Skin: no rashes, ulcerations, lesions  Psychiatric: Responsive     Transplant Surgery - Multidisciplinary Progress Note  --------------------------------------------------------------  OLT   11/15/2024        POD#4    Present: Patient seen and examined with multidisciplinary Transplant team including (Surgery: Dr. Hutchinson. Hepatology: Dr. Delgado, JAZZ Nguyễn, SICU team in AM rounds. Disciplines not in attendance will be notified of the plan.     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    Interval Events:  - Afebrile, VSS   - Repeat Liver dopp- patent vasc   - magdalena dc'd, passed TOV   - AAOx3    Immunosuppression:  -FK per level MMF 1/1, SST  -SIMULECT #2 on POD#4  -ongoing monitoring for signs of rejection    Potential Discharge date: Pending clinical course        MEDICATIONS  (STANDING):  aspirin enteric coated 81 milliGRAM(s) Oral daily  buDESOnide    Inhalation Suspension 0.5 milliGRAM(s) Inhalation two times a day  calcium carbonate 1250 mG  + Vitamin D (OsCal 500 + D) 1 Tablet(s) Oral two times a day  chlorhexidine 2% Cloths 1 Application(s) Topical <User Schedule>  dexMEDEtomidine Infusion 0.2 MICROgram(s)/kG/Hr (4.68 mL/Hr) IV Continuous <Continuous>  diltiazem Infusion 10 mG/Hr (10 mL/Hr) IV Continuous <Continuous>  fluconAZOLE   Tablet 400 milliGRAM(s) Oral daily  heparin   Injectable 5000 Unit(s) SubCutaneous every 12 hours  insulin glargine Injectable (LANTUS) 18 Unit(s) SubCutaneous at bedtime  insulin lispro (ADMELOG) corrective regimen sliding scale   SubCutaneous three times a day with meals  insulin lispro (ADMELOG) corrective regimen sliding scale   SubCutaneous at bedtime  insulin lispro Injectable (ADMELOG) 10 Unit(s) SubCutaneous three times a day before meals  melatonin 5 milliGRAM(s) Oral at bedtime  methylPREDNISolone sodium succinate Injectable   IV Push   metoprolol tartrate 50 milliGRAM(s) Oral every 8 hours  mycophenolate mofetil Suspension 1000 milliGRAM(s) Oral <User Schedule>  NIFEdipine XL 60 milliGRAM(s) Oral daily  pantoprazole    Tablet 40 milliGRAM(s) Oral before breakfast  polyethylene glycol 3350 17 Gram(s) Oral daily  QUEtiapine 25 milliGRAM(s) Oral at bedtime  senna 2 Tablet(s) Oral at bedtime  tamsulosin 0.4 milliGRAM(s) Oral at bedtime  trimethoprim   80 mG/sulfamethoxazole 400 mG 1 Tablet(s) Oral daily  ursodiol Capsule 300 milliGRAM(s) Oral two times a day  valGANciclovir 450 milliGRAM(s) Oral daily    MEDICATIONS  (PRN):  albuterol/ipratropium for Nebulization 3 milliLiter(s) Nebulizer every 6 hours PRN Shortness of Breath and/or Wheezing  HYDROmorphone  Injectable 1 milliGRAM(s) IV Push every 3 hours PRN Breakthrough Pain  oxyCODONE    IR 5 milliGRAM(s) Oral every 4 hours PRN Moderate Pain (4 - 6)  oxyCODONE    IR 10 milliGRAM(s) Oral every 4 hours PRN Severe Pain (7 - 10)  QUEtiapine 12.5 milliGRAM(s) Oral every 12 hours PRN Agitation      PAST MEDICAL & SURGICAL HISTORY:  DM (diabetes mellitus)      HTN (hypertension)      Paroxysmal atrial fibrillation      Cirrhosis      HCC (hepatocellular carcinoma)      History of BPH          Vital Signs Last 24 Hrs  T(C): 36.9 (19 Nov 2024 19:00), Max: 37.6 (19 Nov 2024 10:15)  T(F): 98.4 (19 Nov 2024 19:00), Max: 99.7 (19 Nov 2024 15:00)  HR: 116 (19 Nov 2024 20:00) (84 - 148)  BP: 141/60 (19 Nov 2024 20:00) (100/50 - 187/83)  BP(mean): 86 (19 Nov 2024 20:00) (72 - 118)  RR: 32 (19 Nov 2024 20:00) (17 - 37)  SpO2: 98% (19 Nov 2024 20:00) (92% - 100%)    Parameters below as of 19 Nov 2024 19:00  Patient On (Oxygen Delivery Method): nasal cannula  O2 Flow (L/min): 3      I&O's Summary    18 Nov 2024 07:01  -  19 Nov 2024 07:00  --------------------------------------------------------  IN: 821.6 mL / OUT: 2315 mL / NET: -1493.4 mL    19 Nov 2024 07:01  -  19 Nov 2024 20:47  --------------------------------------------------------  IN: 220.2 mL / OUT: 1045 mL / NET: -824.8 mL                              10.5   15.56 )-----------( 120      ( 19 Nov 2024 06:06 )             32.6     11-19    132[L]  |  99  |  44[H]  ----------------------------<  151[H]  3.8   |  24  |  0.61    Ca    7.7[L]      19 Nov 2024 06:06  Phos  2.5     11-19  Mg     2.8     11-19    TPro  5.0[L]  /  Alb  3.3  /  TBili  1.7[H]  /  DBili  x   /  AST  49[H]  /  ALT  175[H]  /  AlkPhos  194[H]  11-19    Tacrolimus (), Serum: 1.1 ng/mL (11-19 @ 06:06)        Culture - Body Fluid with Gram Stain (collected 11-15-24 @ 20:13)  Source: Body Fluid  Gram Stain (11-16-24 @ 06:12):    No polymorphonuclear leukocytes seen    No organisms seen    by cytocentrifuge  Preliminary Report (11-19-24 @ 12:32):    No growth to date.      Culture - Body Fluid with Gram Stain (collected 11-15-24 @ 20:13)  Source: Body Fluid  Gram Stain (11-16-24 @ 06:12):    No polymorphonuclear leukocytes seen    No organisms seen    by cytocentrifuge  Preliminary Report (11-17-24 @ 11:02):    No growth          Review of systems  All other systems were reviewed and are negative, except as noted.    PHYSICAL EXAM:  Constitutional: Well developed / well nourished  Eyes:  PERRLA  ENMT: nc/at, no thrush  Neck: supple   Respiratory: CTA B/L  Cardiovascular: RRR  Gastrointestinal: Soft abdomen, ND, appropriate incisional TTP. chevron incision c/d/i, staples in place. No signs of infection.  JPx3 ss  Genitourinary: Voiding spontaneously   Extremities: SCD's in place and working bilaterally  Neurological: A&O x3  Skin: no rashes, ulcerations, lesions  Psychiatric: Responsive

## 2024-11-19 NOTE — BH CONSULTATION LIAISON PROGRESS NOTE - NSBHASSESSMENTFT_PSY_ALL_CORE
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 Patient seen by transplant psychiatry for concerns of anxiety post transplant. Patient on exam noted a hx of low mood, anxiety secondary to recent health problems 3 months ago, with need for liver transplant, of which he has been followed by Dr. Hobbs outpatient for management for his mood and anxiety. He noted post operatively experiencing symptoms, anxiety, with anxious ruminations, difficulty with sleep as well as  with feelings of guilt regarding organ from decreased donor.  He denied overt symptoms of panic attacks, lara, AH/Vh/HI/SI, intent or plan. Patient educated at bedside regarding medication regiment, including recommendations for both acute anxiety alleviation as well as sleep. Patient verbalized agreement and understanding with plan proposed.     11/19:Patient on exam A/Ox2, noted to be delirious, denied any AH/VH/HI/SI, intent or plan, educated about medication regiment.      Plan  -Can consider starting Seroquel 25mg PO HS if QTC < 500 for sleep architecture  -Can consider starting Seroquel 12.5mg PO BID PRN for acute anxiety alleviation if QTC < 500   -HOLD Abilify and Remeron at this time  -Please avoid using any Benzodiazepines or anticholingeric medications at this time, as it may worsen his mental status    -Please HOLD: Wellbutrin, Lexapro at this time.

## 2024-11-20 PROCEDURE — 36415 COLL VENOUS BLD VENIPUNCTURE: CPT

## 2024-11-20 PROCEDURE — 78830 RP LOCLZJ TUM SPECT W/CT 1: CPT | Mod: MH

## 2024-11-20 PROCEDURE — 86901 BLOOD TYPING SEROLOGIC RH(D): CPT

## 2024-11-20 PROCEDURE — P9100: CPT

## 2024-11-20 PROCEDURE — C1769: CPT

## 2024-11-20 PROCEDURE — 36430 TRANSFUSION BLD/BLD COMPNT: CPT

## 2024-11-20 PROCEDURE — 36247 INS CATH ABD/L-EXT ART 3RD: CPT

## 2024-11-20 PROCEDURE — C1889: CPT

## 2024-11-20 PROCEDURE — 36248 INS CATH ABD/L-EXT ART ADDL: CPT

## 2024-11-20 PROCEDURE — 80053 COMPREHEN METABOLIC PANEL: CPT

## 2024-11-20 PROCEDURE — 86850 RBC ANTIBODY SCREEN: CPT

## 2024-11-20 PROCEDURE — C1894: CPT

## 2024-11-20 PROCEDURE — 78830 RP LOCLZJ TUM SPECT W/CT 1: CPT | Mod: MC

## 2024-11-20 PROCEDURE — 86900 BLOOD TYPING SEROLOGIC ABO: CPT

## 2024-11-20 PROCEDURE — 75774 ARTERY X-RAY EACH VESSEL: CPT

## 2024-11-20 PROCEDURE — 77300 RADIATION THERAPY DOSE PLAN: CPT

## 2024-11-20 PROCEDURE — 82962 GLUCOSE BLOOD TEST: CPT

## 2024-11-20 PROCEDURE — 76380 CAT SCAN FOLLOW-UP STUDY: CPT

## 2024-11-20 PROCEDURE — C2616: CPT

## 2024-11-20 PROCEDURE — C9399: CPT

## 2024-11-20 PROCEDURE — 75726 ARTERY X-RAYS ABDOMEN: CPT | Mod: XU

## 2024-11-20 PROCEDURE — 85027 COMPLETE CBC AUTOMATED: CPT

## 2024-11-20 PROCEDURE — P9037: CPT

## 2024-11-20 PROCEDURE — A9540: CPT

## 2024-11-20 PROCEDURE — 37243 VASC EMBOLIZE/OCCLUDE ORGAN: CPT

## 2024-11-20 PROCEDURE — 79445 NUCLEAR RX INTRA-ARTERIAL: CPT

## 2024-11-20 PROCEDURE — C1887: CPT

## 2024-11-20 NOTE — CHART NOTE - NSCHARTNOTEFT_GEN_A_CORE
SICU PA Note    Patient noted to have possible aspiration event by bedside nurse and family.  When nurse entered the room, patient had food in his mouth and was saturating in the 70s on NC.  He was quickly escalated to NRB and then HFNC, however, patient remained hyopxic.  Decision was made to intubate.  Anesthesia called to bedside.   Patient was sedated with Versed, Etomidate and Rocuronium.  Size 8.0 ETT was placed at 23cm at the lip via direct visualization on first attempt.  Placement confirmed with color change capnography and b/l breath auscultation.  CXR ordered.  Patient placed on PRVC 14/540/8/100%.  ABG ordered.  Patient placed on Propofol for sedation.  Did require low dose Norepinephrine during intubation, will continue to monitor closely.  Transplant team made aware of above.  Family notified.  Dr. Vareal at bedside.

## 2024-11-20 NOTE — PROGRESS NOTE ADULT - SUBJECTIVE AND OBJECTIVE BOX
24 HOUR EVENTS:  - S/p fleet enema and dulcolax 10mg suppository x1    NEURO  RASS (if intubated): 		[facility] ICU (if concern for delirium):  Exam:   Meds: dexMEDEtomidine Infusion 0.2 MICROgram(s)/kG/Hr IV Continuous <Continuous>  HYDROmorphone  Injectable 1 milliGRAM(s) IV Push every 3 hours PRN Breakthrough Pain  melatonin 5 milliGRAM(s) Oral at bedtime  oxyCODONE    IR 10 milliGRAM(s) Oral every 4 hours PRN Severe Pain (7 - 10)  oxyCODONE    IR 5 milliGRAM(s) Oral every 4 hours PRN Moderate Pain (4 - 6)  QUEtiapine 12.5 milliGRAM(s) Oral every 12 hours PRN Agitation  QUEtiapine 25 milliGRAM(s) Oral at bedtime      RESPIRATORY  RR: 23 (11-20-24 @ 00:00) (20 - 37)  SpO2: 99% (11-20-24 @ 00:00) (92% - 100%)  Wt(kg): --  Exam: Lungs CTA b/l  Mechanical Ventilation:   ABG - ( 18 Nov 2024 06:02 )  pH: 7.44  /  pCO2: 40    /  pO2: 149   / HCO3: 27    / Base Excess: 2.8   /  SaO2: 99.2    Lactate: x                Meds: albuterol/ipratropium for Nebulization 3 milliLiter(s) Nebulizer every 6 hours PRN Shortness of Breath and/or Wheezing  buDESOnide    Inhalation Suspension 0.5 milliGRAM(s) Inhalation two times a day      CARDIOVASCULAR  HR: 90 (11-20-24 @ 00:00) (89 - 148)  BP: 114/57 (11-20-24 @ 00:00) (99/52 - 187/83)  BP(mean): 79 (11-20-24 @ 00:00) (71 - 118)  ABP: --  ABP(mean): --  Wt(kg): --  CVP(cm H2O): --      Exam: Normal S1/S2 w/o murmurs or rubs  Cardiac Rhythm:   Perfusion     [ ]Adequate   [ ]Inadequate  Mentation   [ ]Normal       [ ]Reduced  Extremities  [ ]Warm         [ ]Cool  Volume Status [ ]Hypervolemic [ ]Euvolemic [ ]Hypovolemic  Meds: diltiazem Infusion 10 mG/Hr IV Continuous <Continuous>  metoprolol tartrate 50 milliGRAM(s) Oral every 8 hours  NIFEdipine XL 60 milliGRAM(s) Oral daily      GI/NUTRITION  Exam:   Diet:   Last Bowel Movement: 20-Nov-2024 (11-19-24 @ 23:00)  Last Bowel Movement: 14-Nov-2024 (11-14-24 @ 20:30)    Meds: pantoprazole    Tablet 40 milliGRAM(s) Oral before breakfast  polyethylene glycol 3350 17 Gram(s) Oral daily  senna 2 Tablet(s) Oral at bedtime  ursodiol Capsule 300 milliGRAM(s) Oral two times a day      GENITOURINARY  I&O's Detail    11-18 @ 07:01  -  11-19 @ 07:00  --------------------------------------------------------  IN:    Dexmedetomidine: 51.6 mL    IV PiggyBack: 300 mL    Oral Fluid: 470 mL  Total IN: 821.6 mL    OUT:    Bulb (mL): 255 mL    Bulb (mL): 75 mL    Bulb (mL): 600 mL    Indwelling Catheter - Urethral (mL): 435 mL    Voided (mL): 950 mL  Total OUT: 2315 mL    Total NET: -1493.4 mL      11-19 @ 07:01  -  11-20 @ 00:46  --------------------------------------------------------  IN:    Dexmedetomidine: 84.3 mL    Diltiazem: 160 mL    IV PiggyBack: 50 mL  Total IN: 294.3 mL    OUT:    Bulb (mL): 130 mL    Bulb (mL): 275 mL    Voided (mL): 820 mL  Total OUT: 1225 mL    Total NET: -930.7 mL          11-19    132[L]  |  99  |  44[H]  ----------------------------<  151[H]  3.8   |  24  |  0.61    Ca    7.7[L]      19 Nov 2024 06:06  Phos  2.5     11-19  Mg     2.8     11-19    TPro  5.0[L]  /  Alb  3.3  /  TBili  1.7[H]  /  DBili  x   /  AST  49[H]  /  ALT  175[H]  /  AlkPhos  194[H]  11-19    Meds: calcium carbonate 1250 mG  + Vitamin D (OsCal 500 + D) 1 Tablet(s) Oral two times a day  tamsulosin 0.4 milliGRAM(s) Oral at bedtime      HEMATOLOGIC  Meds: aspirin enteric coated 81 milliGRAM(s) Oral daily  heparin   Injectable 5000 Unit(s) SubCutaneous every 12 hours                          10.5   15.56 )-----------( 120      ( 19 Nov 2024 06:06 )             32.6     PT/INR - ( 19 Nov 2024 06:06 )   PT: 15.5 sec;   INR: 1.36 ratio         PTT - ( 19 Nov 2024 06:06 )  PTT:25.2 sec    INFECTIOUS DISEASES  T(C): 36.9 (11-19-24 @ 23:00), Max: 37.6 (11-19-24 @ 10:15)  Wt(kg): --  WBC Count: 15.56 K/uL (11-19 @ 06:06)    Recent Cultures:  Specimen Source: Body Fluid, 11-15 @ 20:13; Results   No growth to date.; Gram Stain:   No polymorphonuclear leukocytes seen  No organisms seen  by cytocentrifuge; Organism: --    Meds: fluconAZOLE   Tablet 400 milliGRAM(s) Oral daily  mycophenolate mofetil Suspension 1000 milliGRAM(s) Oral <User Schedule>  trimethoprim   80 mG/sulfamethoxazole 400 mG 1 Tablet(s) Oral daily  valGANciclovir 450 milliGRAM(s) Oral daily      ENDOCRINE  Capillary Blood Glucose    Meds: insulin glargine Injectable (LANTUS) 18 Unit(s) SubCutaneous at bedtime  insulin lispro (ADMELOG) corrective regimen sliding scale   SubCutaneous three times a day with meals  insulin lispro (ADMELOG) corrective regimen sliding scale   SubCutaneous at bedtime  insulin lispro Injectable (ADMELOG) 10 Unit(s) SubCutaneous three times a day before meals  methylPREDNISolone sodium succinate Injectable 40 milliGRAM(s) IV Push every 24 hours  methylPREDNISolone sodium succinate Injectable   IV Push       ACCESS DEVICES:  [ ] Peripheral IV  [ ] Central Venous Line		[ ] R	[ ] L	[ ] IJ	[ ] Fem	[ ] SC	Placed:   [ ] Arterial Line			[ ] R	[ ] L	[ ] Fem	[ ] Rad	[ ] Ax	Placed:   [ ] PICC:					[ ] Mediport  [ ] Urinary Catheter, Date Placed:   [ ] Necessity of urinary, arterial, and venous catheters discussed    OTHER MEDICATIONS:  chlorhexidine 2% Cloths 1 Application(s) Topical <User Schedule>      IMAGING:

## 2024-11-20 NOTE — CONSULT NOTE ADULT - CRITICAL CARE ATTENDING COMMENT
HPI as per resident note, personally verified by me. History corroborated by multiple family members (who are also physicians) present at bedside. 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 He had also been having significant anxiety as an outpatient and was taking alprazolam 6-8mg PO daily. His benzodiazepines as well as Lexapro and Wellbutrin were stopped this hospitalization. About two days prior his family noted seizure-like activity with a possible Jacksonian March. Since then he has been increasingly agitated despite medications and today had an aspiration event requiring intubation. He is currently sedated. No reports focal neurologic deficits or other abnormal movements.    GTT:  Propofol 40 mcg/kg/min    ROS: Due to clinical condition unable to assess (BERE)    Gen - NAD, Intubated, sedated  CV - Peripheral circulation intact, no evidence of edema  Eyes - Fundoscopy not well visualized    Neurologic exam:  MS - Intubated, sedated, obtunded, attends to auditory = tactile > visual stimuli b/l, no speech output, does not follow commands. BERE orientation, rep/naming, attn/conc/recent and remote memory/fund of knowledge  CN - Pupils small and sluggishly reactive b/l, EOMI by OCR, VF unclear as does not reliably BTT but intermittently focus on faces, face sens/str intact by corneals b/l, hearing grossly intact to conversation b/l, (+) spontaneous respirations (patient rate 18 bpm, vent rate 14 bpm), (+) cough, SCM at least 3+/5 b/l and symmetric. BERE tongue/palate  Motor - Normal bulk and dec tone throughout. No spontaneous movements noted. He grimaces, localizes, and withdraws to strong tactile stimuli in BUE's 2/5 but not in BLE's 0/5  Sens - As per Motor above  DTR's - 2+ BR b/l, 0+ rest, and neutral b/l plantar response  Coord - BERE  Gait and station - BERE    Pertinent labs/studies:  CBC with inc WBC 15.78, low H/H 10/31 and MCV dec 79, low plt 147  PT/INR inc 15.5/1.35, PTT dec 23.2  BMP with low Na 130, inc BUN 69, otherwise essentially WNL  Albumin dec 2.8, LFT's with inc ALT/AST/alk phos 121/44/154  HIV (-), UA (-)    < from: CT Head No Cont (11.19.24 @ 14:37) >    No acute intracranial hemorrhage, mass effect, or midline shift.    < end of copied text >      A/P:  Encephalopathy  Seizure  DM type 2  HTN  P A fib s/p ablation 2018  CAD  Depression/anxiety on chronic benzodiazepines  GONZALES cirrhosis and HCC with portal HTN s/p OLT on 11/15  Bicytopenia  Hyponatremia  Aspiration PNA    - Etiology for patient's mental status change is likely related to toxic/metabolic state for acute medical issues as well as seizure from benzodiazepine withdrawal. No reported focal neurologic deficits now or prior, so doubt cerebrovascular event. Could also be related to toxicity from immunosuppressant medications but less likely. CT head, personally reviewed by me, with small vessel ischemic changes and atrophy but no acute intracranial findings  - vEEG to evaluate for focal slowing, epileptiform discharges, or seizures  - Lorazepam 3mg IV [id number] and dec by 0.5mg IV [id number] every two days until OFF  - No other ASM's needed at this time  - Minimize sedation with propofol and barbiturates (Precedex, fentanyl, hydromorphone OK)  - Check labs for additional causes with B12, folate, TSH, free T4, Vitamin D 25 OH, B1, B6, NH3, UA, RVP, WNV, Lyme  - Can decided on utility of further work-up, such as MRI brain or LP, depending on clinical course and results of above studies  - Above discussed with SICU team, who verbalized agreement and understanding  - Continue to address above medical and surgical issues, as you are doing  - Will continue to follow patient with you

## 2024-11-20 NOTE — CONSULT NOTE ADULT - ASSESSMENT
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 POD#5 sp liver transplant. Neurology consulted 11/20, after patient reported difficulty sleeping. Then had episode of delirium and agitation overnight. Patient AAOx4 baseline per team and overnight/today patient AAOx2. Patient was taking xanax at home and stopped, there was note of seizure activity not clear.    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 POD#5 sp liver transplant. Neurology consulted 11/20, after patient reported difficulty sleeping. Then had episode of delirium and agitation overnight. Patient AAOx4 baseline per team and overnight/today patient AAOx2. Patient was taking xanax at home and stopped, there was note of seizure activity not clear.    impression: could be benzodiazepine withdrawal seizures vs toxic metabolic encephalopathy     plan:  [ ] vEEG  [ ] start ativan 3mg q 8 hours and taper progressively as tolerated: decrease 0.5 mg q 8 hours every 2 days   [ ] Recommend monitoring off ASMs - discontinue keppra  [ ] check toxic/metabolic labs (B1, B6, B12, Vitamin D, Folate, TSH, T3, Ammonia, Lactic)  [ ] call [phone] for further questions or concerns    case seen with Dr. Serrano

## 2024-11-20 NOTE — PROGRESS NOTE ADULT - SUBJECTIVE AND OBJECTIVE BOX
Transplant Surgery - Multidisciplinary Progress Note  --------------------------------------------------------------  OLT   11/15/2024        POD#5    Present: Patient seen and examined with multidisciplinary Transplant team including (Surgery: Dr. Hutchinson. Hepatology: Dr. Delgado, JAZZ Nguyễn, SICU team in AM rounds. Disciplines not in attendance will be notified of the plan.     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    Interval Events:  - Afebrile, VSS   - completed simulect   - afib w/ RVR found on tele, dilt gtt started  - started seroquel    Immunosuppression:  -FK per level MMF 1/1, SST  -SIMULECT completed  -ongoing monitoring for signs of rejection    Potential Discharge date: Pending clinical course      TX DATA HERE        Review of systems  All other systems were reviewed and are negative, except as noted.    PHYSICAL EXAM:  Constitutional: Well developed / well nourished  Eyes:  PERRLA  ENMT: nc/at, no thrush  Neck: supple   Respiratory: CTA B/L  Cardiovascular: RRR  Gastrointestinal: Soft abdomen, ND, appropriate incisional TTP. chevron incision c/d/i, staples in place. No signs of infection.  JPx3 ss  Genitourinary: Voiding spontaneously   Extremities: SCD's in place and working bilaterally  Neurological: A&O x3  Skin: no rashes, ulcerations, lesions  Psychiatric: Responsive     Transplant Surgery - Multidisciplinary Progress Note  --------------------------------------------------------------  OLT   11/15/2024        POD#5    Present: Patient seen and examined with multidisciplinary Transplant team including (Surgery: Dr. Hutchinson. Hepatology: Dr. Delgado, JAZZ Nguyễn, SICU team in AM rounds. Disciplines not in attendance will be notified of the plan.     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    Interval Events:  - Afebrile, VSS   - completed simulect   - afib w/ RVR found on tele, dilt gtt started  - started seroquel    Immunosuppression:  -FK per level MMF 1/1, SST  -SIMULECT completed  -ongoing monitoring for signs of rejection    Potential Discharge date: Pending clinical course        MEDICATIONS  (STANDING):  albuterol/ipratropium for Nebulization 3 milliLiter(s) Nebulizer every 6 hours  aspirin  chewable 81 milliGRAM(s) Oral daily  buDESOnide    Inhalation Suspension 0.5 milliGRAM(s) Inhalation two times a day  calcium carbonate 1250 mG  + Vitamin D (OsCal 500 + D) 1 Tablet(s) Oral two times a day  chlorhexidine 0.12% Liquid 15 milliLiter(s) Oral Mucosa every 12 hours  chlorhexidine 2% Cloths 1 Application(s) Topical <User Schedule>  fluconAZOLE   Tablet 400 milliGRAM(s) Oral daily  heparin   Injectable 5000 Unit(s) SubCutaneous every 12 hours  insulin glargine Injectable (LANTUS) 11 Unit(s) SubCutaneous at bedtime  insulin lispro (ADMELOG) corrective regimen sliding scale   SubCutaneous every 6 hours  LORazepam   Injectable 3 milliGRAM(s) IV Push every 8 hours  methylPREDNISolone sodium succinate Injectable   IV Push   metoprolol tartrate 50 milliGRAM(s) Oral every 6 hours  mycophenolate mofetil Suspension 1000 milliGRAM(s) Oral <User Schedule>  norepinephrine Infusion 0.02 MICROgram(s)/kG/Min (3.51 mL/Hr) IV Continuous <Continuous>  pantoprazole  Injectable 40 milliGRAM(s) IV Push daily  piperacillin/tazobactam IVPB.. 3.375 Gram(s) IV Intermittent every 8 hours  polyethylene glycol 3350 17 Gram(s) Oral <User Schedule>  propofol Infusion 30 MICROgram(s)/kG/Min (16.8 mL/Hr) IV Continuous <Continuous>  QUEtiapine 50 milliGRAM(s) Oral at bedtime  QUEtiapine 12.5 milliGRAM(s) Oral <User Schedule>  senna Syrup 10 milliLiter(s) Oral at bedtime  sodium chloride 0.9%. 1000 milliLiter(s) (30 mL/Hr) IV Continuous <Continuous>  sodium chloride 3%  Inhalation 4 milliLiter(s) Inhalation every 6 hours  trimethoprim   80 mG/sulfamethoxazole 400 mG 1 Tablet(s) Oral daily  ursodiol Suspension 300 milliGRAM(s) Oral two times a day  valGANciclovir 450 milliGRAM(s) Oral daily    MEDICATIONS  (PRN):  HYDROmorphone  Injectable 1 milliGRAM(s) IV Push every 3 hours PRN Breakthrough Pain  QUEtiapine 12.5 milliGRAM(s) Oral every 12 hours PRN Agitation      PAST MEDICAL & SURGICAL HISTORY:  DM (diabetes mellitus)      HTN (hypertension)      Paroxysmal atrial fibrillation      Cirrhosis      HCC (hepatocellular carcinoma)      History of BPH          Vital Signs Last 24 Hrs  T(C): 36.8 (20 Nov 2024 11:00), Max: 37.2 (20 Nov 2024 03:00)  T(F): 98.2 (20 Nov 2024 11:00), Max: 98.9 (20 Nov 2024 03:00)  HR: 106 (20 Nov 2024 17:47) (89 - 125)  BP: 98/58 (20 Nov 2024 17:00) (75/47 - 141/60)  BP(mean): 72 (20 Nov 2024 17:00) (58 - 110)  RR: 18 (20 Nov 2024 17:00) (16 - 34)  SpO2: 100% (20 Nov 2024 17:47) (88% - 100%)    Parameters below as of 20 Nov 2024 17:47  Patient On (Oxygen Delivery Method): ventilator        I&O's Summary    19 Nov 2024 07:01  -  20 Nov 2024 07:00  --------------------------------------------------------  IN: 395.2 mL / OUT: 1715 mL / NET: -1319.8 mL    20 Nov 2024 07:01  -  20 Nov 2024 18:41  --------------------------------------------------------  IN: 560.4 mL / OUT: 1380 mL / NET: -819.6 mL                              10.0   15.78 )-----------( 147      ( 20 Nov 2024 05:45 )             31.1     11-20    130[L]  |  96  |  69[H]  ----------------------------<  169[H]  3.8   |  21[L]  |  1.01    Ca    7.7[L]      20 Nov 2024 05:45  Phos  3.7     11-20  Mg     2.5     11-20    TPro  5.0[L]  /  Alb  3.1[L]  /  TBili  1.5[H]  /  DBili  x   /  AST  44[H]  /  ALT  121[H]  /  AlkPhos  154[H]  11-20    Tacrolimus (), Serum: 1.4 ng/mL (11-20 @ 05:48)        Culture - Body Fluid with Gram Stain (collected 11-15-24 @ 20:13)  Source: Body Fluid  Gram Stain (11-16-24 @ 06:12):    No polymorphonuclear leukocytes seen    No organisms seen    by cytocentrifuge  Preliminary Report (11-19-24 @ 12:32):    No growth to date.          Review of systems  All other systems were reviewed and are negative, except as noted.    PHYSICAL EXAM:  Constitutional: Well developed / well nourished  Eyes:  PERRLA  ENMT: nc/at, no thrush  Neck: supple   Respiratory: CTA B/L  Cardiovascular: RRR  Gastrointestinal: Soft abdomen, ND, appropriate incisional TTP. chevron incision c/d/i, staples in place. No signs of infection.  JPx3 ss  Genitourinary: Voiding spontaneously   Extremities: SCD's in place and working bilaterally  Neurological: A&O x0  Skin: no rashes, ulcerations, lesions  Psychiatric: Responsive

## 2024-11-20 NOTE — CONSULT NOTE ADULT - ASSESSMENT
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 Pt underwent Y90 Sept, 2023 with favorable treatment response. Pt completed OLTx evaluation and listed for OLTx 5/03/2024. Now s/p OLT on 11/15/24. EP consulted for post-op Afib with RVR to 140's which started on 11/19/24 at 2:12am.      1. Post-op AF RVR  -Can't attempt rhythm control unless pt can be on full anticoagulation.   -Continue rate control strategy with metoprolol, uptitrate as BP can tolerate  -Continue telemetry monitor     2. Acute confusion  -Reports pt was AOx4 yesterday, now AOx1  -CT HA (non contrast) no acute findings  -Pending UA and blood cultures    -Plan discussed with SICU team.     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 Pt underwent Y90 Sept, 2023 with favorable treatment response. Pt completed OLTx evaluation and listed for OLTx 5/03/2024. Now s/p OLT on 11/15/24. EP consulted for post-op Afib with RVR to 140's which started on 11/19/24 at 2:12am.      1. Post-op AF RVR  -Can't attempt rhythm control unless pt can be on full anticoagulation.   -Continue rate control strategy with metoprolol, uptitrate as BP can tolerate  -Continue telemetry monitor     2. Acute confusion  -Reports pt was AOx4 yesterday, now AOx1  -CT HA (non contrast) no acute findings  -Pending UA and blood cultures    -Continue rate control strategy. EP will sign off, reconsult as needed. Plan discussed with SICU team.     THELMA rFancis NP-C  [phone]

## 2024-11-20 NOTE — CHART NOTE - NSCHARTNOTEFT_GEN_A_CORE
Patient attempted to been by transplant psychiatry for followup, on exam patient was intubated, unable to participate in exam at this time, will continue to follow patient during hospitalization.

## 2024-11-20 NOTE — PROGRESS NOTE ADULT - SUBJECTIVE AND OBJECTIVE BOX
HPI:  Patient seen and examined at bedside in SICU.  Remains in atrial fibrillation - now rate controlled.  Remains encephalopathic. Suspect immunosuppressant reaction.    Review Of Systems:     Unable to assess        Medications:  albuterol/ipratropium for Nebulization 3 milliLiter(s) Nebulizer every 6 hours  aspirin  chewable 81 milliGRAM(s) Oral daily  buDESOnide    Inhalation Suspension 0.5 milliGRAM(s) Inhalation two times a day  calcium carbonate 1250 mG  + Vitamin D (OsCal 500 + D) 1 Tablet(s) Oral two times a day  chlorhexidine 0.12% Liquid 15 milliLiter(s) Oral Mucosa every 12 hours  chlorhexidine 2% Cloths 1 Application(s) Topical <User Schedule>  fluconAZOLE   Tablet 400 milliGRAM(s) Oral daily  heparin   Injectable 5000 Unit(s) SubCutaneous every 12 hours  HYDROmorphone  Injectable 1 milliGRAM(s) IV Push every 3 hours PRN  insulin glargine Injectable (LANTUS) 16 Unit(s) SubCutaneous at bedtime  insulin lispro (ADMELOG) corrective regimen sliding scale   SubCutaneous every 6 hours  levETIRAcetam  IVPB 500 milliGRAM(s) IV Intermittent every 12 hours  LORazepam   Injectable 1 milliGRAM(s) IV Push every 8 hours  methylPREDNISolone sodium succinate Injectable   IV Push   metoprolol tartrate 50 milliGRAM(s) Oral every 6 hours  mycophenolate mofetil Suspension 1000 milliGRAM(s) Oral <User Schedule>  naloxegol 12.5 milliGRAM(s) Oral <User Schedule>  pantoprazole  Injectable 40 milliGRAM(s) IV Push daily  piperacillin/tazobactam IVPB.. 3.375 Gram(s) IV Intermittent every 8 hours  polyethylene glycol 3350 17 Gram(s) Oral <User Schedule>  predniSONE   Tablet 20 milliGRAM(s) Oral daily  propofol Infusion 30 MICROgram(s)/kG/Min IV Continuous <Continuous>  senna Syrup 10 milliLiter(s) Oral at bedtime  sodium chloride 0.9%. 1000 milliLiter(s) IV Continuous <Continuous>  sodium chloride 3%  Inhalation 4 milliLiter(s) Inhalation every 6 hours  trimethoprim   80 mG/sulfamethoxazole 400 mG 1 Tablet(s) Oral daily  ursodiol Suspension 300 milliGRAM(s) Oral two times a day  valGANciclovir 450 milliGRAM(s) Oral daily    PAST MEDICAL & SURGICAL HISTORY:  Diabetes      Transaminitis      Paroxysmal atrial fibrillation      Depression      BPH (benign prostatic hyperplasia)      Hypertension      Chronic atrial fibrillation      Coronary artery disease      Hepatocellular carcinoma      DM (diabetes mellitus)      HTN (hypertension)      Paroxysmal atrial fibrillation      Cirrhosis      HCC (hepatocellular carcinoma)      History of BPH      History of laparoscopic cholecystectomy      History of lumbar laminectomy      H/O prior ablation treatment      H/O percutaneous left heart catheterization        Vitals:  T(C): 36.9 (11-20-24 @ 19:00), Max: 37.2 (11-20-24 @ 03:00)  HR: 103 (11-20-24 @ 23:08) (90 - 125)  BP: 106/55 (11-20-24 @ 22:00) (75/47 - 131/60)  BP(mean): 72 (11-20-24 @ 22:00) (58 - 102)  RR: 21 (11-20-24 @ 22:00) (16 - 34)  SpO2: 100% (11-20-24 @ 23:08) (88% - 100%)  Wt(kg): --  Daily     Daily   I&O's Summary    19 Nov 2024 07:01  -  20 Nov 2024 07:00  --------------------------------------------------------  IN: 395.2 mL / OUT: 1715 mL / NET: -1319.8 mL    20 Nov 2024 07:01  -  20 Nov 2024 23:29  --------------------------------------------------------  IN: 1156 mL / OUT: 1805 mL / NET: -649 mL        Physical Exam:  Appearance: No acute distress; well appearing  Eyes: PERRL, EOMI, pink conjunctiva  HENT: Normal oral mucosa  Cardiovascular: RRR, S1, S2, no murmurs, rubs, or gallops; no edema; no JVD  Respiratory: Clear to auscultation bilaterally  Gastrointestinal: soft, non-tender, non-distended with normal bowel sounds  Musculoskeletal: No clubbing; no joint deformity   Neurologic: Non-focal  Lymphatic: No lymphadenopathy  Psychiatry: AAOx3, mood & affect appropriate  Skin: No rashes, ecchymoses, or cyanosis                          10.0   15.78 )-----------( 147      ( 20 Nov 2024 05:45 )             31.1     11-20    130[L]  |  96  |  69[H]  ----------------------------<  169[H]  3.8   |  21[L]  |  1.01    Ca    7.7[L]      20 Nov 2024 05:45  Phos  3.7     11-20  Mg     2.5     11-20    TPro  5.0[L]  /  Alb  3.1[L]  /  TBili  1.5[H]  /  DBili  x   /  AST  44[H]  /  ALT  121[H]  /  AlkPhos  154[H]  11-20    PT/INR - ( 20 Nov 2024 05:45 )   PT: 15.5 sec;   INR: 1.35 ratio         PTT - ( 20 Nov 2024 05:45 )  PTT:23.2 sec        Cardiovascular Diagnostic Testing:  ECG: sinus rhythm    Echo: < from: Intra-Operative Transesophageal Echo W or WO Ultrasound Enhancing Agent (11.15.24 @ 07:46) >  --------------------------------------------------------------------------------  PRE-BYPASS FINDINGS     Left Ventricle:  Left ventricular ejection fraction is estimated at 60 to 65%. Normal left ventricularwall thickness. The left ventricular systolic function is normal There are no regional wall motion abnormalities seen.     Right Ventricle:  The right ventricular cavity is normal in size, with normal wall thickness and right ventricular systolic function is normal. Right ventricular systolic function is normal.     Left Atrium:  The left atrium is normal in size. No thrombus visualized in left atrial appendage.     Right Atrium:  The right atrium is normal in size. The right atrium is normal in size.     Interatrial Septum:  No PFO visualized with color flow doppler.     Aortic Valve:  The aortic valve appears trileaflet. There is no aortic valve stenosis. There is trace aortic regurgitation.     Mitral Valve:  There is no mitral valve stenosis. There is no mitral valve stenosis. There is trace mitral regurgitation.     Tricuspid Valve:  There is no evidence of tricuspid stenosis. There is trace tricuspid regurgitation.     Pericardium:  No pericardial effusion seen.     Post-Bypass:  S/P OLT. Under current loading conditions, hyperdynamic left ventricular systolic function, EF: 70-75% by visual estimate, no RWMA. Normal right ventricular systolic function. All other findings unchanged. Probe removed atraumatically, no blood. The patient tolerated the procedure well and without complications. Permanent recorded images are stored in the medical record.     Electronically signed by James Villareal on 11/15/2024 at 2:18:16 PM         *** Final ***    < end of copied text >      Stress Testing:     Cath: 4/24/2024, patient had his LHC repeated with Ben Villareal MD at [facility].  Cath showed multivessel coronary artery disease, but the lesions previously noted were FFR negative.  He continues to have MIRTA 3 flow throughout.    Interpretation of Telemetry: AFib with RVR    Imaging:

## 2024-11-20 NOTE — PROGRESS NOTE ADULT - ASSESSMENT
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     PLAN:  Neuro:  - A&Ox4, however increasing delirium  - on precedex gtt  - DC'd abilify and remeron, avoid benzos per psych  - seroquel 12.5mg PRN + 25mg QHS  - CT head 11/19 negative  - pain control: oxy PRN       Resp:  - prn NC, wean to room air  - wean supplemental O2 as able for SpO2 >92%  - Home budesonide & duonebs    CV:  - goal MAP > 65 mmHg  - not requiring pressors  - metoprolol 50 TID, nifedipine 60mg qd  - diltiazem gtt started 11/19     GI:   - Liver US 11/18 with good arterial flow and stable fluid collection   - Diet: Consistent carb   - Senna/ Miralax   - s/p fleet enema and dulcolax suppository 11/19  - Protonix PO  - ALYSSA x3, mointor output    Renal:  - Monitor I&Os and electrolytes w/ repletions as necessary  - voiding spontaneously   - flomax   - lasix 20mg IV x1 on 11/19    Heme:  - Monitor CBC and coags  - SQH per transplant   - ASA 81mg qd  - SCDs     ID:   - Monitor for clinical evidence of active infection  - Monitor WBC, temperature, and procalcitonin  - transplant ppx with fluconazole, bactrim, valcyte  - immunosuppression per transplant     Endo:   - Monitor glucose  - lantus 18u, premeal 6u, ISS  - post transplant steroid taper    Lines/drains:   - RIJ triple lumen   - L radial A line   - PIVs     Code Status: full code     Disposition: SICU

## 2024-11-20 NOTE — CONSULT NOTE ADULT - SUBJECTIVE AND OBJECTIVE BOX
CHIEF COMPLAINT:    HISTORY OF PRESENT ILLNESS:      Allergies    No Known Allergies    Intolerances    	    MEDICATIONS:  aspirin enteric coated 81 milliGRAM(s) Oral daily  heparin   Injectable 5000 Unit(s) SubCutaneous every 12 hours  metoprolol tartrate 50 milliGRAM(s) Oral every 6 hours  NIFEdipine XL 60 milliGRAM(s) Oral daily    fluconAZOLE   Tablet 400 milliGRAM(s) Oral daily  piperacillin/tazobactam IVPB. 3.375 Gram(s) IV Intermittent once  piperacillin/tazobactam IVPB.- 3.375 Gram(s) IV Intermittent once  piperacillin/tazobactam IVPB.. 3.375 Gram(s) IV Intermittent every 8 hours  trimethoprim   80 mG/sulfamethoxazole 400 mG 1 Tablet(s) Oral daily  valGANciclovir 450 milliGRAM(s) Oral daily    albuterol/ipratropium for Nebulization 3 milliLiter(s) Nebulizer every 6 hours PRN  buDESOnide    Inhalation Suspension 0.5 milliGRAM(s) Inhalation two times a day    dexMEDEtomidine Infusion 0.2 MICROgram(s)/kG/Hr IV Continuous <Continuous>  HYDROmorphone  Injectable 1 milliGRAM(s) IV Push every 3 hours PRN  levETIRAcetam 500 milliGRAM(s) Oral two times a day  melatonin 5 milliGRAM(s) Oral at bedtime  oxyCODONE    IR 10 milliGRAM(s) Oral every 4 hours PRN  oxyCODONE    IR 5 milliGRAM(s) Oral every 4 hours PRN  QUEtiapine 12.5 milliGRAM(s) Oral every 12 hours PRN  QUEtiapine 50 milliGRAM(s) Oral at bedtime  QUEtiapine 12.5 milliGRAM(s) Oral once    pantoprazole    Tablet 40 milliGRAM(s) Oral before breakfast  polyethylene glycol 3350 17 Gram(s) Oral <User Schedule>  senna 2 Tablet(s) Oral at bedtime  ursodiol Capsule 300 milliGRAM(s) Oral two times a day    insulin glargine Injectable (LANTUS) 22 Unit(s) SubCutaneous at bedtime  insulin lispro (ADMELOG) corrective regimen sliding scale   SubCutaneous three times a day with meals  insulin lispro (ADMELOG) corrective regimen sliding scale   SubCutaneous at bedtime  insulin lispro Injectable (ADMELOG) 12 Unit(s) SubCutaneous three times a day before meals  methylPREDNISolone sodium succinate Injectable   IV Push     albumin human  5% IVPB 250 milliLiter(s) IV Intermittent once  calcium carbonate 1250 mG  + Vitamin D (OsCal 500 + D) 1 Tablet(s) Oral two times a day  chlorhexidine 2% Cloths 1 Application(s) Topical <User Schedule>  mycophenolate mofetil Suspension 1000 milliGRAM(s) Oral <User Schedule>  sodium chloride 0.9%. 1000 milliLiter(s) IV Continuous <Continuous>  tamsulosin 0.4 milliGRAM(s) Oral at bedtime      PAST MEDICAL & SURGICAL HISTORY:  DM (diabetes mellitus)      HTN (hypertension)      Paroxysmal atrial fibrillation      Cirrhosis      HCC (hepatocellular carcinoma)      History of BPH          FAMILY HISTORY:      SOCIAL HISTORY:    [ ] Non-smoker  [ ] Smoker  [ ] Alcohol      REVIEW OF SYSTEMS:  See HPI. Otherwise, 12 point ROS done and otherwise negative.    PHYSICAL EXAM:  T(C): 37 (11-20-24 @ 07:00), Max: 37.6 (11-19-24 @ 15:00)  HR: 103 (11-20-24 @ 10:00) (89 - 135)  BP: 121/58 (11-20-24 @ 10:00) (99/52 - 141/60)  RR: 20 (11-20-24 @ 10:00) (17 - 37)  SpO2: 94% (11-20-24 @ 10:00) (94% - 100%)  Wt(kg): --  I&O's Summary    19 Nov 2024 07:01  -  20 Nov 2024 07:00  --------------------------------------------------------  IN: 395.2 mL / OUT: 1715 mL / NET: -1319.8 mL    20 Nov 2024 07:01  -  20 Nov 2024 11:07  --------------------------------------------------------  IN: 10 mL / OUT: 500 mL / NET: -490 mL        Appearance: Normal	  HEENT: PERRL, EOMI	  Cardiovascular: Normal S1 S2, No JVD, No murmurs, No edema  Respiratory: Lungs clear to auscultation	  Psychiatry: A & O x 3, Mood & affect appropriate  Gastrointestinal:  Soft, Non-tender, + BS	  Skin: No rashes, No ecchymoses, No cyanosis	  Neurologic: Grossly intact  Extremities: No clubbing, cyanosis or edema  Vascular: Peripheral pulses palpable 2+ bilaterally        LABS:	 	    CBC Full  -  ( 20 Nov 2024 05:45 )  WBC Count : 15.78 K/uL  Hemoglobin : 10.0 g/dL  Hematocrit : 31.1 %  Platelet Count - Automated : 147 K/uL  Mean Cell Volume : 78.9 fl  Mean Cell Hemoglobin : 25.4 pg  Mean Cell Hemoglobin Concentration : 32.2 g/dL  Auto Neutrophil # : x  Auto Lymphocyte # : x  Auto Monocyte # : x  Auto Eosinophil # : x  Auto Basophil # : x  Auto Neutrophil % : x  Auto Lymphocyte % : x  Auto Monocyte % : x  Auto Eosinophil % : x  Auto Basophil % : x    11-20    130[L]  |  96  |  69[H]  ----------------------------<  169[H]  3.8   |  21[L]  |  1.01  11-19    132[L]  |  99  |  44[H]  ----------------------------<  151[H]  3.8   |  24  |  0.61    Ca    7.7[L]      20 Nov 2024 05:45  Ca    7.7[L]      19 Nov 2024 06:06  Phos  3.7     11-20  Phos  2.5     11-19  Mg     2.5     11-20  Mg     2.8     11-19    TPro  5.0[L]  /  Alb  3.1[L]  /  TBili  1.5[H]  /  DBili  x   /  AST  44[H]  /  ALT  121[H]  /  AlkPhos  154[H]  11-20  TPro  5.0[L]  /  Alb  3.3  /  TBili  1.7[H]  /  DBili  x   /  AST  49[H]  /  ALT  175[H]  /  AlkPhos  194[H]  11-19      proBNP:   Lipid Profile:   HgA1c:   TSH:       CARDIAC MARKERS:                TELEMETRY: 	    ECG:  	  RADIOLOGY:  OTHER: 	    PREVIOUS DIAGNOSTIC TESTING:    [ ] Echocardiogram:  [ ]  Catheterization:  [ ] Stress Test:  	  	  ASSESSMENT/PLAN: 	     CHIEF COMPLAINT: AF with RVR post OLT    HISTORY OF PRESENT ILLNESS:  74 year old male, retired urologist PMH DM, HTN, pAfib s/p ablation  (no AC 2/2 thrombocytopenia), CAD, depression, anxiety, BPH, 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 Pt underwent Y90 2023 with favorable treatment response.Pt completed OLTx evaluation and listed for OLTx 2024. Now s/p OLT on 11/15/24. EP consulted for post-op Afib with RVR to 140's which started on 24 at 2:12am.      Allergies    No Known Allergies    Intolerances    	    MEDICATIONS:  aspirin enteric coated 81 milliGRAM(s) Oral daily  heparin   Injectable 5000 Unit(s) SubCutaneous every 12 hours  metoprolol tartrate 50 milliGRAM(s) Oral every 6 hours  NIFEdipine XL 60 milliGRAM(s) Oral daily  fluconAZOLE   Tablet 400 milliGRAM(s) Oral daily  piperacillin/tazobactam IVPB. 3.375 Gram(s) IV Intermittent once  piperacillin/tazobactam IVPB.- 3.375 Gram(s) IV Intermittent once  piperacillin/tazobactam IVPB.. 3.375 Gram(s) IV Intermittent every 8 hours  trimethoprim   80 mG/sulfamethoxazole 400 mG 1 Tablet(s) Oral daily  valGANciclovir 450 milliGRAM(s) Oral daily  albuterol/ipratropium for Nebulization 3 milliLiter(s) Nebulizer every 6 hours PRN  buDESOnide    Inhalation Suspension 0.5 milliGRAM(s) Inhalation two times a day  dexMEDEtomidine Infusion 0.2 MICROgram(s)/kG/Hr IV Continuous <Continuous>  HYDROmorphone  Injectable 1 milliGRAM(s) IV Push every 3 hours PRN  levETIRAcetam 500 milliGRAM(s) Oral two times a day  melatonin 5 milliGRAM(s) Oral at bedtime  oxyCODONE    IR 10 milliGRAM(s) Oral every 4 hours PRN  oxyCODONE    IR 5 milliGRAM(s) Oral every 4 hours PRN  QUEtiapine 12.5 milliGRAM(s) Oral every 12 hours PRN  QUEtiapine 50 milliGRAM(s) Oral at bedtime  QUEtiapine 12.5 milliGRAM(s) Oral once  pantoprazole    Tablet 40 milliGRAM(s) Oral before breakfast  polyethylene glycol 3350 17 Gram(s) Oral <User Schedule>  senna 2 Tablet(s) Oral at bedtime  ursodiol Capsule 300 milliGRAM(s) Oral two times a day  insulin glargine Injectable (LANTUS) 22 Unit(s) SubCutaneous at bedtime  insulin lispro (ADMELOG) corrective regimen sliding scale   SubCutaneous three times a day with meals  insulin lispro (ADMELOG) corrective regimen sliding scale   SubCutaneous at bedtime  insulin lispro Injectable (ADMELOG) 12 Unit(s) SubCutaneous three times a day before meals  methylPREDNISolone sodium succinate Injectable   IV Push   albumin human  5% IVPB 250 milliLiter(s) IV Intermittent once  calcium carbonate 1250 mG  + Vitamin D (OsCal 500 + D) 1 Tablet(s) Oral two times a day  chlorhexidine 2% Cloths 1 Application(s) Topical <User Schedule>  mycophenolate mofetil Suspension 1000 milliGRAM(s) Oral <User Schedule>  sodium chloride 0.9%. 1000 milliLiter(s) IV Continuous <Continuous>  tamsulosin 0.4 milliGRAM(s) Oral at bedtime      PAST MEDICAL & SURGICAL HISTORY:  DM (diabetes mellitus)  HTN (hypertension)  Paroxysmal atrial fibrillation  Cirrhosis  HCC (hepatocellular carcinoma)  History of BPH      FAMILY HISTORY: unable to assess, pt is confused    SOCIAL HISTORY: unable to assess, pt is confused        REVIEW OF SYSTEMS:  See HPI. unable to assess, pt is confused      PHYSICAL EXAM:  T(C): 37 (24 @ 07:00), Max: 37.6 (24 @ 15:00)  HR: 103 (24 @ 10:00) (89 - 135)  BP: 121/58 (24 @ 10:00) (99/52 - 141/60)  RR: 20 (24 @ 10:00) (17 - 37)  SpO2: 94% (24 @ 10:00) (94% - 100%)  Wt(kg): --  I&O's Summary    2024 07:01  -  2024 07:00  --------------------------------------------------------  IN: 395.2 mL / OUT: 1715 mL / NET: -1319.8 mL    2024 07:01  -  2024 11:07  --------------------------------------------------------  IN: 10 mL / OUT: 500 mL / NET: -490 mL        Appearance: Normal	  HEENT: PERRL, EOMI	  Cardiovascular: Normal S1 S2, Irregularly irregular, No JVD, No murmurs  Respiratory: Lungs clear to auscultation	  Psychiatry: A & O x 1, Mood & affect appropriate  Gastrointestinal: Soft, Non-tender, + BS	  Skin: No rashes, No ecchymoses, No cyanosis	  Neurologic: Grossly intact  Extremities: No clubbing, cyanosis or edema  Vascular: Peripheral pulses palpable 2+ bilaterally        LABS:	 	    CBC Full  -  ( 2024 05:45 )  WBC Count : 15.78 K/uL  Hemoglobin : 10.0 g/dL  Hematocrit : 31.1 %  Platelet Count - Automated : 147 K/uL  Mean Cell Volume : 78.9 fl  Mean Cell Hemoglobin : 25.4 pg  Mean Cell Hemoglobin Concentration : 32.2 g/dL  Auto Neutrophil # : x  Auto Lymphocyte # : x  Auto Monocyte # : x  Auto Eosinophil # : x  Auto Basophil # : x  Auto Neutrophil % : x  Auto Lymphocyte % : x  Auto Monocyte % : x  Auto Eosinophil % : x  Auto Basophil % : x    11    130[L]  |  96  |  69[H]  ----------------------------<  169[H]  3.8   |  21[L]  |  1.01      132[L]  |  99  |  44[H]  ----------------------------<  151[H]  3.8   |  24  |  0.61    Ca    7.7[L]      2024 05:45  Ca    7.7[L]      2024 06:06  Phos  3.7       Phos  2.5       Mg     2.5       Mg     2.8         TPro  5.0[L]  /  Alb  3.1[L]  /  TBili  1.5[H]  /  DBili  x   /  AST  44[H]  /  ALT  121[H]  /  AlkPhos  154[H]    TPro  5.0[L]  /  Alb  3.3  /  TBili  1.7[H]  /  DBili  x   /  AST  49[H]  /  ALT  175[H]  /  AlkPhos  194[H]          TELEMETRY: Afib with VHR currently in the 100's, range  (up to 140's this morning)   	    EC/19/24 @ 0224: Afib with RVR at 128bpm  24 @ 0123: SR at 84bpm, PAC    < from: Intra-Operative Transesophageal Echo W or WO Ultrasound Enhancing Agent (11.15.24 @ 07:46) >  AYDEN Procedure:  AYDEN exam risks and benefits discussed with patient, and patient agrees to intra-operative AYDEN exam. OGT placed and stomach suctioned prior to insertion. The adult Sandra AYDEN probe was introduced and passed into the esophagus. Probe verified to be unlocked prior to insertion. Bite block used. Probe inserted into the mouth, advanced into esophagus atraumatically. Images were obtained with the patient in a supine position. AYDEN for monitoring only.     --------------------------------------------------------------------------------  PRE-BYPASS FINDINGS     Left Ventricle:  Left ventricular ejection fraction is estimated at 60 to 65%. Normal left ventricularwall thickness. The left ventricular systolic function is normal There are no regional wall motion abnormalities seen.     Right Ventricle:  The right ventricular cavity is normal in size, with normal wall thickness and right ventricular systolic function is normal. Right ventricular systolic function is normal.     Left Atrium:  The left atrium is normal in size. No thrombus visualized in left atrial appendage.     Right Atrium:  The right atrium is normal in size. The right atrium is normal in size.     Interatrial Septum:  No PFO visualized with color flow doppler.     Aortic Valve:  The aortic valve appears trileaflet. There is no aortic valve stenosis. There is trace aortic regurgitation.     Mitral Valve:  There is no mitral valve stenosis. There is no mitral valve stenosis. There is trace mitral regurgitation.     Tricuspid Valve:  There is no evidence of tricuspid stenosis. There is trace tricuspid regurgitation.     Pericardium:  No pericardial effusion seen.     Post-Bypass:  S/P OLT. Under current loading conditions, hyperdynamic left ventricular systolic function, EF: 70-75% by visual estimate, no RWMA. Normal right ventricular systolic function. All other findings unchanged. Probe removed atraumatically, no blood. The patient tolerated the procedure well and without complications. Permanent recorded images are stored in the medical record.    < end of copied text >

## 2024-11-20 NOTE — CONSULT NOTE ADULT - SUBJECTIVE AND OBJECTIVE BOX
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 POD#5 after liver transplant, given tacrolimus 11/16 and stopped after patient was agitated.     Neurology consulted 11/20, after patient reported difficulty sleeping. Then had episode of delirium and agitation overnight. Patient AAOx4 baseline per team and overnight/today patient AAOx2. Patient received Atarax 25 mg, melatonin 5mg, Seroquel 25 mg 2100 and additional dose 12.5mg at midnight. Despite these meds, patient remained agitated and was started on Precedex gtt 0.2mcg/kg, which was DC this morning. NCCTH obtained this morning which was negative.     Behavioral psych was also consulted and saw patient with recs to DC patients abilify and remeron. Wellbutrin and Lexapro were also stopped. Recs to give Seroquel 25 mg HS and have Seroquel 12.5mg Q12 PRN for agitation/anxiety if QTC <500.     Family stated that patient was taking xanax 2mg QID; total of 8mg daily. Had abrupt stop s/p liver transplant. Family noted facial twitching, dysarthria and intermittent agitation/delirium within the last 24-48 hrs suspicious for seizures.     Currently on Zosyn, Fluconazole and Valacyclovir   Afib with RVR was on diltiazem gtt which has now been DC. Increased metoprolol to 50mg Q6. EP following.    ROS:    NEUROLOGICAL: +As stated in HPI above   All other review of systems is negative unless indicated above.    Allergies:  No Known Allergies      PMHx/PSHx/Family Hx: As above, otherwise see below   DM (diabetes mellitus)    HTN (hypertension)    Paroxysmal atrial fibrillation    Cirrhosis    HCC (hepatocellular carcinoma)    History of BPH    Social Hx:  No current use of tobacco, alcohol, or illicit drugs    Medications:  MEDICATIONS  (STANDING):  albuterol/ipratropium for Nebulization 3 milliLiter(s) Nebulizer every 6 hours  aspirin  chewable 81 milliGRAM(s) Oral daily  buDESOnide    Inhalation Suspension 0.5 milliGRAM(s) Inhalation two times a day  calcium carbonate 1250 mG  + Vitamin D (OsCal 500 + D) 1 Tablet(s) Oral two times a day  chlorhexidine 0.12% Liquid 15 milliLiter(s) Oral Mucosa every 12 hours  chlorhexidine 2% Cloths 1 Application(s) Topical <User Schedule>  fluconAZOLE   Tablet 400 milliGRAM(s) Oral daily  heparin   Injectable 5000 Unit(s) SubCutaneous every 12 hours  insulin glargine Injectable (LANTUS) 11 Unit(s) SubCutaneous at bedtime  insulin lispro (ADMELOG) corrective regimen sliding scale   SubCutaneous every 6 hours  LORazepam   Injectable 3 milliGRAM(s) IV Push every 8 hours  methylPREDNISolone sodium succinate Injectable   IV Push   metoprolol tartrate 50 milliGRAM(s) Oral every 6 hours  mycophenolate mofetil Suspension 1000 milliGRAM(s) Oral <User Schedule>  norepinephrine Infusion 0.02 MICROgram(s)/kG/Min (3.51 mL/Hr) IV Continuous <Continuous>  pantoprazole  Injectable 40 milliGRAM(s) IV Push daily  piperacillin/tazobactam IVPB.. 3.375 Gram(s) IV Intermittent every 8 hours  polyethylene glycol 3350 17 Gram(s) Oral <User Schedule>  propofol Infusion 30 MICROgram(s)/kG/Min (16.8 mL/Hr) IV Continuous <Continuous>  QUEtiapine 50 milliGRAM(s) Oral at bedtime  QUEtiapine 12.5 milliGRAM(s) Oral <User Schedule>  senna Syrup 10 milliLiter(s) Oral at bedtime  sodium chloride 0.9%. 1000 milliLiter(s) (30 mL/Hr) IV Continuous <Continuous>  sodium chloride 3%  Inhalation 4 milliLiter(s) Inhalation every 6 hours  trimethoprim   80 mG/sulfamethoxazole 400 mG 1 Tablet(s) Oral daily  ursodiol Suspension 300 milliGRAM(s) Oral two times a day  valGANciclovir 450 milliGRAM(s) Oral daily    MEDICATIONS  (PRN):  HYDROmorphone  Injectable 1 milliGRAM(s) IV Push every 3 hours PRN Breakthrough Pain  QUEtiapine 12.5 milliGRAM(s) Oral every 12 hours PRN Agitation      Vitals:  T(C): 36.8 (11-20-24 @ 11:00), Max: 37.2 (11-20-24 @ 03:00)  HR: 112 (11-20-24 @ 16:30) (89 - 125)  BP: 106/56 (11-20-24 @ 16:30) (75/47 - 141/60)  RR: 25 (11-20-24 @ 16:30) (16 - 34)  SpO2: 100% (11-20-24 @ 16:30) (88% - 100%)    Physical Examination:  General - sedated under propofol and intubated    Neurologic exam:  pupils constricted sluggishly reactive, no obvious facial asymmetry    Motor: slight withdrawal in all limbs    sensory: grimaces to pain in all 4 limbs    reflexes: diffuse 1 + to mute reflexes    could not assess coordination and gait with current situation     Labs:                        10.0   15.78 )-----------( 147      ( 20 Nov 2024 05:45 )             31.1     11-20    130[L]  |  96  |  69[H]  ----------------------------<  169[H]  3.8   |  21[L]  |  1.01    Ca    7.7[L]      20 Nov 2024 05:45  Phos  3.7     11-20  Mg     2.5     11-20    TPro  5.0[L]  /  Alb  3.1[L]  /  TBili  1.5[H]  /  DBili  x   /  AST  44[H]  /  ALT  121[H]  /  AlkPhos  154[H]  11-20    CAPILLARY BLOOD GLUCOSE      POCT Blood Glucose.: 222 mg/dL (20 Nov 2024 11:43)    LIVER FUNCTIONS - ( 20 Nov 2024 05:45 )  Alb: 3.1 g/dL / Pro: 5.0 g/dL / ALK PHOS: 154 U/L / ALT: 121 U/L / AST: 44 U/L / GGT: x             PT/INR - ( 20 Nov 2024 05:45 )   PT: 15.5 sec;   INR: 1.35 ratio         PTT - ( 20 Nov 2024 05:45 )  PTT:23.2 sec  CSF:                  Radiology:  CT Head No Cont:  (19 Nov 2024 14:37)    < from: CT Head No Cont (11.19.24 @ 14:37) >  IMPRESSION:  No acute intracranial hemorrhage, mass effect, or midline shift.    < end of copied text >

## 2024-11-20 NOTE — CHART NOTE - NSCHARTNOTEFT_GEN_A_CORE
PDI	First Name	Last Name	Birth Date	Gender	Street Address	[facility]	Zip Code  A	Ramón Davison	1950	Male	[address]	[facility]	[address]    Prescription Information      PDI Filter:    PDI	Current Rx	Drug Type	Rx Written	Rx Dispensed	Drug	Quantity	Days Supply	Prescriber Name	Prescriber DEJAH #	Payment Method	Dispenser  A	N	B	10/03/2024	10/10/2024	alprazolam 2 mg tablet	30	30	Spring Hobbs MD	[id number]	Medicare	[id number]  A	N	B	09/09/2024	09/10/2024	alprazolam 2 mg tablet	30	30	Spring Hobbs MD	[id number]	Medicare	[id number]  A	N	B	08/07/2024	08/08/2024	alprazolam 2 mg tablet	30	30	Spring Hobbs MD	[id number]	Medicare	[id number]  A	N	B	05/20/2024	05/20/2024	alprazolam 2 mg tablet	30	30	Cheryl Davison MD	[id number]	Insurance	[facility]  A	N	B	04/08/2024	04/08/2024	alprazolam 2 mg tablet	30	30	Cheryl Davison MD	[id number]	Medicare	[id number]  A	N		03/11/2024	03/11/2024	zolpidem tartrate 10 mg tablet	30	30	Cheryl Davison MD	[id number]	Insurance	[facility].

## 2024-11-20 NOTE — PROGRESS NOTE ADULT - ATTENDING COMMENTS
I have seen and examined this patient on multidisciplinary SICU rounds this morning. I have reviewed all new labs, imaging and reports. 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     24 hours events:   NCHCT for new altered mental status, no intracranial abnormalities   Delirious overnight. recevied seroquel 25mg followed by 12.5mg, atarax, precedex gtt  Diltiazem gtt off, HR 100s   Enema overnight with bowel movement    I/Os   UOP 1200  Net -1300    Neuro: AAO1, slow to respond. Holding home abilify and remeron and benzo. On seroquel 25mg qHS. will increase to 50mg qHS and start 12.5mg in AM; transplant psych following  Resp: home air, home nebs PRN   CV: AFib RVR now rate controlled. metoprolol 50 TID, nifedipine 60, off diltiazem gtt.   GI: Colon dilated on CXR. will obtain AXR. Tolerating regular diet, PO PPI, bowel regimen. Immunosuppression per hepatology; LFTs downtrending  Renal: hold diuresis given Cr bump, 250 albumin + iIVF, voiding  Heme: SQH BID, ASA 81  ID: fluc/bactrim/valcyte ppx, blood cx today, start zosyn (11/20- )  Endo: glucose 200s, increase to lantus 22u with 12u premeal  Lines: PIVs    The patient required critical care. Critical care time was indicated due to the patient's inherent instability and high risk for decompensation. E & M work was done by me in multiple 10-15 minute intervals over the course of the day in the ICU. I have coordinated care with multiple teams, and reviewed the medical record, consult notes, ICU flow sheets, cardiac monitoring, mechanical ventilation, medication record, brain and body imaging, and serial sequential labs.           Total time spent in the care of this patient today (excluding procedures): 40 minutes                    Over 50% of the total time was spent in discussion and coordination of care with consulting services, dietary and rehab services.      Whit Varela MD  Trauma and Critical Care

## 2024-11-20 NOTE — PROGRESS NOTE ADULT - ASSESSMENT
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  Stable perihepatic fluid collection measuring 4.5 x 1.9 x 3.4 cm.   - regular diet   -PT/OT/SCDs/spirometry  -bowel regimen as able  -ASA/SQH  -leukocytosis and fever curve, likely reactive-- improving   -Home psych medications restarted, Transplant Psych following; agitated, dc remeron/abilify, start seroquel    Immunosuppression  -FK per level, MMF 1/1, SST  -SIMULECT #2 on POD#4  -PPx: Valcyte/Bactrim/PPI/OsCal/Fluc    HTN/ pAFib  -Metoprolol, Nifedipine, adjust prn.   - Dr. 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  Stable perihepatic fluid collection measuring 4.5 x 1.9 x 3.4 cm.   - regular diet   -PT/OT/SCDs/spirometry  -bowel regimen as able  -ASA/SQH  -leukocytosis and fever curve, likely reactive-- improving   - abdominal xray     []AMS   - reintubated  -ativan 3mg q8  - EEG   -Home psych medications restarted, Transplant Psych following; agitated, dc remeron/abilify, start seroquel    Immunosuppression  -FK per level, MMF 1/1, SST  -SIMULECT #2 on POD#4  -PPx: Valcyte/Bactrim/PPI/OsCal/Fluc    HTN/ pAFib  -Metoprolol, Nifedipine, adjust prn.   - Dr. Quintanilla aware, on diltazem gtt  - CT head neg    DM  -Lantus/Lispro, adjust prn  -Endocrine as needed given steroids

## 2024-11-20 NOTE — PROGRESS NOTE ADULT - NS ATTEND AMEND GEN_ALL_CORE FT
POD5.  Simulect yesterday.  Good allograft function.  Continues to have rAFib. Dr. Quintanilla following.  AMS.  CT Head neg.    Will give volume.    ASA81.  Immuno: Continue to hold CNI.  Cellcept 1gm BID.  Steroid taper POD5.  Simulect yesterday.  Good allograft function.  Continues to have rAFib. Dr. Quintanilla following.  AMS.  CT Head neg. Will check cultures. Start Zosyn empirically.  Keppra.   Will give volume.    ASA81.  Immuno: Continue to hold CNI.  Cellcept 1gm BID.  Steroid taper

## 2024-11-21 NOTE — PROGRESS NOTE ADULT - SUBJECTIVE AND OBJECTIVE BOX
NEUROLOGY FOLLOW-UP CONSULT NOTE    RFC: Encephalopathy, seizure    Interval history: No acute neurologic events overnight. Patient more attentive and following some commands as sedation lowered but also with intermittent agitation. No reported clear focal neurologic deficits or abnormal movements. Connected to vEEG, at bedside.    Meds:  MEDICATIONS  (STANDING):  albuterol/ipratropium for Nebulization 3 milliLiter(s) Nebulizer every 6 hours  aspirin Suppository 300 milliGRAM(s) Rectal daily  buDESOnide    Inhalation Suspension 0.5 milliGRAM(s) Inhalation two times a day  calcium carbonate 1250 mG  + Vitamin D (OsCal 500 + D) 1 Tablet(s) Oral two times a day  chlorhexidine 0.12% Liquid 15 milliLiter(s) Oral Mucosa every 12 hours  chlorhexidine 2% Cloths 1 Application(s) Topical <User Schedule>  fluconAZOLE IVPB 400 milliGRAM(s) IV Intermittent every 24 hours  heparin   Injectable 5000 Unit(s) SubCutaneous every 12 hours  insulin glargine Injectable (LANTUS) 16 Unit(s) SubCutaneous at bedtime  insulin lispro (ADMELOG) corrective regimen sliding scale   SubCutaneous every 6 hours  levETIRAcetam  IVPB 500 milliGRAM(s) IV Intermittent every 12 hours  LORazepam   Injectable 1 milliGRAM(s) IV Push every 8 hours  metoprolol tartrate Injectable 5 milliGRAM(s) IV Push every 6 hours  naloxegol 12.5 milliGRAM(s) Oral <User Schedule>  pantoprazole  Injectable 40 milliGRAM(s) IV Push daily  piperacillin/tazobactam IVPB.. 3.375 Gram(s) IV Intermittent every 8 hours  predniSONE   Tablet 20 milliGRAM(s) Oral daily  propofol Infusion 30 MICROgram(s)/kG/Min (16.8 mL/Hr) IV Continuous <Continuous>  sodium chloride 0.9%. 1000 milliLiter(s) (30 mL/Hr) IV Continuous <Continuous>  sodium chloride 3%  Inhalation 4 milliLiter(s) Inhalation every 6 hours  trimethoprim   80 mG/sulfamethoxazole 400 mG 1 Tablet(s) Oral daily  ursodiol Suspension 300 milliGRAM(s) Oral two times a day  valGANciclovir 450 milliGRAM(s) Oral daily    MEDICATIONS  (PRN):  HYDROmorphone  Injectable 1 milliGRAM(s) IV Push every 3 hours PRN Breakthrough Pain    GTT:  Propofol 20 mcg/kg/min    PMHx/PSHx/FHx/SHx:  Status post liver transplantation    Family history of coronary artery disease (Father, Sibling, Sibling)    Family history of diabetes mellitus (Father, Mother)    Family history of coronary artery disease (Sibling)    Handoff    Diabetes    Transaminitis    Paroxysmal atrial fibrillation    Depression    BPH (benign prostatic hyperplasia)    Hypertension    Hyperlipidemia    Chronic atrial fibrillation    BPH (benign prostatic hyperplasia)    Coronary artery disease    Hepatocellular carcinoma    DM (diabetes mellitus)    HTN (hypertension)    Paroxysmal atrial fibrillation    Cirrhosis    HCC (hepatocellular carcinoma)    History of BPH    Liver cirrhosis    Liver cirrhosis    Major depressive disorder, recurrent episode, mild with anxious distress    Liver cirrhosis    Status post liver transplant    Transplant, liver, into recipient,  donor    History of laparoscopic cholecystectomy    History of lumbar laminectomy    H/O prior ablation treatment    H/O percutaneous left heart catheterization    LIVER TRANSPLANT STATUS    Room Service Assist    [id number]        Allergies:  No Known Allergies      ROS: Due to clinical condition unable to assess (BERE)    O:  T(C): 37.2 (24 @ 15:00), Max: 38 (24 @ 11:00)  HR: 128 (24 @ 15:10) (99 - 128)  BP: 125/56 (24 @ 15:00) (92/50 - 128/69)  RR: 23 (24 @ 15:00) (17 - 28)  SpO2: 96% (24 @ 15:10) (93% - 100%)    Focused neurologic exam:  MS - Intubated, sedated, lethargic, attends to all stimuli but auditory = tactile > visual stimuli b/l, no speech output, follows simple commands. BERE orientation, rep/naming, attn/conc/recent and remote memory/fund of knowledge  CN - Pupils sluggishly reactive b/l, EOMI by OCR, VFF as BTT b/l and focuses on faces, face sens/str intact by corneals b/l, hearing grossly intact to conversation b/l, (+) spontaneous respirations (patient rate 21 bpm, vent rate 14 bpm), (+) cough, SCM at least 4/5 b/l and symmetric, tongue midline. BERE palate  Motor - Normal bulk and normal tone throughout. Spontaneous movements of BUE's > BLE's. He grimaces, localizes, and withdraws to strong tactile stimuli in RUE proximal 3+/5 -> distal 4+/5, LUE proximal 3/5 -> distal 3+-4/5, BLE's proximal 2+/5 -> distal 4+/5  Sens - As per Motor above  DTR's - 2+ BR b/l, 0+ rest, and neutral b/l plantar response  Coord - Grossly appropriate for level of strength, no tremors noted  Gait and station - BERE    Pertinent labs/studies:  CBC with inc WBC 13.93, low H/H 10/30 and MCV dec 79, low plt 146  PT/INR inc 15.9/1.40, PTT dec 23.3  BMP with low Na 133, low K 3.2, inc BUN 74, otherwise essentially WNL  Albumin dec 2.9, LFT's with inc ALT 77  Mg inc 2.8, Phos inc 5.8  HIV (-), UA (-), B12 WNL, Vitamin D dec < 6.0, TSH WNL, T4 dec 3.1, NH3 WNL, RVP (-)  Tacrolimus level 1.0    vEEG  -  EEG Classification / Summary:  Abnormal EEG in the awake, drowsy, and asleep states.   Mild diffuse slowing.  No focal or epileptiform abnormalities are captured.   No seizures.    Clinical Impression:  Mild diffuse cerebral dysfunction is nonspecific in etiology.   No epileptiform abnormalities or seizures.  Single-lead EKG: Irregularly irregular rhythm.    < from: CT Head No Cont (24 @ 14:37) >    No acute intracranial hemorrhage, mass effect, or midline shift.    < end of copied text >

## 2024-11-21 NOTE — BH CONSULTATION LIAISON PROGRESS NOTE - NSBHASSESSMENTFT_PSY_ALL_CORE
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 Patient seen by transplant psychiatry for concerns of anxiety post transplant. Patient on exam noted a hx of low mood, anxiety secondary to recent health problems 3 months ago, with need for liver transplant, of which he has been followed by Dr. Hobbs outpatient for management for his mood and anxiety. He noted post operatively experiencing symptoms, anxiety, with anxious ruminations, difficulty with sleep as well as  with feelings of guilt regarding organ from decreased donor.  He denied overt symptoms of panic attacks, lara, AH/Vh/HI/SI, intent or plan. Patient educated at bedside regarding medication regiment, including recommendations for both acute anxiety alleviation as well as sleep. Patient verbalized agreement and understanding with plan proposed.     11/19:Patient on exam A/Ox2, noted to be delirious, denied any AH/VH/HI/SI, intent or plan, educated about medication regiment.    11/21:Patient on exam intubated, collateral from Son at bedside, noted patient has been using Xanax more than prescribed amount on ISTOP.       Plan  -Currently on Ativan Taper started by neurology, with EEG in place   -Please HOLD: Wellbutrin, Lexapro, Abilify, Remeron given patient altered mental status   -Transplant psychiatry will continue to follow patient

## 2024-11-21 NOTE — PROGRESS NOTE ADULT - SUBJECTIVE AND OBJECTIVE BOX
24 HOUR EVENTS:  - Intubated 11/20 for hypoxia 2/2 ?aspiration   - OG tube placed   - iSTOP   - miralax TID  - f/u XR abd today  - 250 cc albumin x1 and NS @ 30 cc/hr  - BCx, UA, procal  - increase lantus 22u, 12 TID premeal  - Keppra 500 BID       NEURO  Exam: follows commands  Meds: HYDROmorphone  Injectable 1 milliGRAM(s) IV Push every 3 hours PRN Breakthrough Pain  levETIRAcetam  IVPB 500 milliGRAM(s) IV Intermittent every 12 hours  LORazepam   Injectable 1 milliGRAM(s) IV Push every 8 hours  propofol Infusion 30 MICROgram(s)/kG/Min IV Continuous <Continuous>      RESPIRATORY  RR: 17 (11-20-24 @ 23:00) (16 - 34)  SpO2: 100% (11-20-24 @ 23:08) (88% - 100%)  Exam: intubated  Mechanical Ventilation: Mode: AC/ CMV (Assist Control/ Continuous Mandatory Ventilation), RR (machine): 14, RR (patient): 22, TV (machine): 540, FiO2: 60, PEEP: 8, ITime: 0.9, MAP: 11, PIP: 25  ABG - ( 20 Nov 2024 14:47 )  pH: 7.40  /  pCO2: 38    /  pO2: 75    / HCO3: 24    / Base Excess: -1.1  /  SaO2: 95.0        Meds: albuterol/ipratropium for Nebulization 3 milliLiter(s) Nebulizer every 6 hours  buDESOnide    Inhalation Suspension 0.5 milliGRAM(s) Inhalation two times a day  sodium chloride 3%  Inhalation 4 milliLiter(s) Inhalation every 6 hours      CARDIOVASCULAR  HR: 103 (11-20-24 @ 23:08) (94 - 125)  BP: 94/50 (11-20-24 @ 23:00) (75/47 - 131/60)  BP(mean): 65 (11-20-24 @ 23:00) (58 - 102)      Exam:   Cardiac Rhythm:   Perfusion     [x ]Adequate   [ ]Inadequate  Mentation   [x ]Normal       [ ]Reduced  Extremities  [x ]Warm         [ ]Cool  Volume Status [ ]Hypervolemic [x ]Euvolemic [ ]Hypovolemic  Meds: metoprolol tartrate 50 milliGRAM(s) Oral every 6 hours      GI/NUTRITION  Exam: soft, NT/ND  Diet: NPO  Meds: naloxegol 12.5 milliGRAM(s) Oral <User Schedule>  pantoprazole  Injectable 40 milliGRAM(s) IV Push daily  polyethylene glycol 3350 17 Gram(s) Oral <User Schedule>  senna Syrup 10 milliLiter(s) Oral at bedtime  ursodiol Suspension 300 milliGRAM(s) Oral two times a day      GENITOURINARY  I&O's Detail    11-19 @ 07:01  -  11-20 @ 07:00  --------------------------------------------------------  IN:    Dexmedetomidine: 145.2 mL    Diltiazem: 200 mL    IV PiggyBack: 50 mL  Total IN: 395.2 mL    OUT:    Bulb (mL): 130 mL    Bulb (mL): 365 mL    Voided (mL): 1220 mL  Total OUT: 1715 mL    Total NET: -1319.8 mL      11-20 @ 07:01  -  11-21 @ 00:02  --------------------------------------------------------  IN:    Albumin 5%  - 250 mL: 250 mL    Diltiazem: 10 mL    Enteral Tube Flush: 220 mL    IV PiggyBack: 200 mL    Propofol: 146 mL    sodium chloride 0.9%: 330 mL  Total IN: 1156 mL    OUT:    Bulb (mL): 230 mL    Bulb (mL): 220 mL    Dexmedetomidine: 0 mL    Indwelling Catheter - Urethral (mL): 955 mL    Norepinephrine: 0 mL    Voided (mL): 400 mL  Total OUT: 1805 mL    Total NET: -649 mL          11-20    130[L]  |  96  |  69[H]  ----------------------------<  169[H]  3.8   |  21[L]  |  1.01    Ca    7.7[L]      20 Nov 2024 05:45  Phos  3.7     11-20  Mg     2.5     11-20    TPro  5.0[L]  /  Alb  3.1[L]  /  TBili  1.5[H]  /  DBili  x   /  AST  44[H]  /  ALT  121[H]  /  AlkPhos  154[H]  11-20    Meds: calcium carbonate 1250 mG  + Vitamin D (OsCal 500 + D) 1 Tablet(s) Oral two times a day  sodium chloride 0.9%. 1000 milliLiter(s) IV Continuous <Continuous>      HEMATOLOGIC  Meds: aspirin  chewable 81 milliGRAM(s) Oral daily  heparin   Injectable 5000 Unit(s) SubCutaneous every 12 hours                          10.0   15.78 )-----------( 147      ( 20 Nov 2024 05:45 )             31.1     PT/INR - ( 20 Nov 2024 05:45 )   PT: 15.5 sec;   INR: 1.35 ratio         PTT - ( 20 Nov 2024 05:45 )  PTT:23.2 sec    INFECTIOUS DISEASES  T(C): 36.4 (11-20-24 @ 23:00), Max: 37.2 (11-20-24 @ 03:00)  Wt(kg): --  WBC Count: 15.78 K/uL (11-20 @ 05:45)    Recent Cultures:  Specimen Source: Body Fluid, 11-15 @ 20:13; Results   No growth to date.; Gram Stain:   No polymorphonuclear leukocytes seen  No organisms seen  by cytocentrifuge; Organism: --    Meds: fluconAZOLE   Tablet 400 milliGRAM(s) Oral daily  mycophenolate mofetil Suspension 1000 milliGRAM(s) Oral <User Schedule>  piperacillin/tazobactam IVPB.. 3.375 Gram(s) IV Intermittent every 8 hours  trimethoprim   80 mG/sulfamethoxazole 400 mG 1 Tablet(s) Oral daily  valGANciclovir 450 milliGRAM(s) Oral daily      ENDOCRINE  Capillary Blood Glucose    Meds: insulin glargine Injectable (LANTUS) 16 Unit(s) SubCutaneous at bedtime  insulin lispro (ADMELOG) corrective regimen sliding scale   SubCutaneous every 6 hours  methylPREDNISolone sodium succinate Injectable   IV Push   predniSONE   Tablet 20 milliGRAM(s) Oral daily      ACCESS DEVICES:  [x ] Peripheral IV  [ ] Central Venous Line		[ ] R	[ ] L	[ ] IJ	[ ] Fem	[ ] SC	Placed:   [ ] Arterial Line			[ ] R	[ ] L	[ ] Fem	[ ] Rad	[ ] Ax	Placed:   [ ] PICC:					[ ] Mediport  [ ] Urinary Catheter, Date Placed:   [ ] Necessity of urinary, arterial, and venous catheters discussed    OTHER MEDICATIONS:  chlorhexidine 0.12% Liquid 15 milliLiter(s) Oral Mucosa every 12 hours  chlorhexidine 2% Cloths 1 Application(s) Topical <User Schedule>      IMAGING:

## 2024-11-21 NOTE — BH CONSULTATION LIAISON PROGRESS NOTE - NSBHFUPINTERVALHXFT_PSY_A_CORE
Patient seen at bedside, with attending, on exam patient was intubated, sedation, unable to participate in exam at this time.     Collateral obtained from Son at bedside: Noted patient has been seen for concerns of mood and anxiety, even prior to three months ago, before hearing the news of the need for transplant. Stated patient has been receiving xanax outpatient, and according to son at bedside, patient was using more doses than prescribed, using "around 6-8mg of xanax" a day. He noted that information regarding xanax use, was not communicated prior to surgery to the transplant team. Endorsed that patient was also previously tried on Rexulti in the past in conjunction with several other antidepressants trials in the past before his current regiment. Educated family at bedside regarding medication regiment, risk, benefits, side effects.   Patient seen at bedside, with attending, on exam patient was intubated, sedation, unable to participate in exam at this time.     Collateral obtained from Son at bedside: Noted patient has been seen for concerns of mood and anxiety, even prior to three months ago, before hearing the news of the need for transplant. Stated patient has been receiving xanax outpatient, and according to son at bedside, patient was using more doses than prescribed, using "around 6-8mg of xanax" a day. Notes given unknown quantity of xanax use, this may have not been relayed to the primary team. Discussed that patient also did not relay this to writer on previous assessment as well. Endorsed that patient was also previously tried on Rexulti in the past in conjunction with several other antidepressants trials in the past before his current regiment. Educated family at bedside regarding medication regiment, risk, benefits, side effects.

## 2024-11-21 NOTE — PROGRESS NOTE ADULT - ASSESSMENT
74 year-old with known coronary artery disease as above presents for liver transplant.  Seen to have chest pain post transplant.  It was atypical of angina and has resolved.  Troponin remained negative.    Now with atrial fibrillation with RVR (11/19) - rate control with beta-blocker as needed  Head CT to evaluate for bleeding in setting of acute mental status change    Rate control  Anticoagulation when cleared by surgery  EP consult

## 2024-11-21 NOTE — CHART NOTE - NSCHARTNOTEFT_GEN_A_CORE
NUTRITION FOLLOW UP NOTE    PATIENT SEEN FOR: tube feed assessment     SOURCE: [] Patient  [x] Current Medical Record  [x] RN  [] Family/support person at bedside  [x] Patient unavailable/inappropriate  [x] Other: Team     CHART REVIEWED/EVENTS NOTED  [x] Nutrition Status:  -S/p OLT (11/15)   -Re-intubated in setting of aspiration ()   -OGT in place     DIET ORDER:   Diet, NPO:   Except Medications (-)    ANTHROPOMETRICS:  Drug Dosing Weight  Height (cm): 182.9 (15 Nov 2024 05:30)  Weight (kg): 93.6 (15 Nov 2024 05:30)  BMI (kg/m2): 28 (15 Nov 2024 05:30)  Daily Weight in k.6 (11-15), Weight in k.6 ()     NUTRITIONALLY PERTINENT MEDICATIONS:  MEDICATIONS  (STANDING):  calcium carbonate 1250 mG  + Vitamin D (OsCal 500 + D)  fluconAZOLE IVPB  insulin glargine Injectable (LANTUS)  insulin lispro (ADMELOG) corrective regimen sliding scale  metoprolol tartrate Injectable  naloxegol  pantoprazole  Injectable  piperacillin/tazobactam IVPB..  predniSONE   Tablet  sodium chloride 0.9%.  trimethoprim   80 mG/sulfamethoxazole 400 mG  ursodiol Suspension  valGANciclovir       NUTRITIONALLY PERTINENT LABS:   Na133 mmol/L[L] Glu 194 mg/dL[H] K+ 3.2 mmol/L[L] Cr  1.17 mg/dL BUN 74 mg/dL[H]  Phos 5.8 mg/dL[H]  Alb 2.9 g/dL[L] ALT 77 U/L[H] AST 22 U/L Alkaline Phosphatase 114 U/L            Finger Sticks:  POCT Blood Glucose.: 209 mg/dL ( @ 05:48)  POCT Blood Glucose.: 182 mg/dL ( @ 00:59)  POCT Blood Glucose.: 166 mg/dL ( @ 22:20)  POCT Blood Glucose.: 222 mg/dL ( @ 11:43)      NUTRITIONALLY PERTINENT MEDICATIONS/LABS:  [x] Reviewed  [x] Relevant notes on medications/labs:  -16 units long acting, sliding scale insulin   -Prednisone   -Propofol: 296kcal/day   -Immunosuppression held     EDEMA:  [x] Reviewed  [] Relevant notes:    GI/ I&O:  [x] Reviewed  -OGT to suction --> 0cc out thus far   -x2 ALYSSA drains   -Last BM documented     SKIN:   [x] No pressure injuries documented, per nursing flowsheet    ESTIMATED NEEDS:  [x] No change:  Energy:   2178-2582kcal/day (27-32 kcal/kg)  Protein:   97-121g/day (1.2-1.5 g/kg)  Fluid:   ml/day or [x] defer to team  Based on: 80.7kg   [place]: 1750kcal/day --> reference while intubated     NUTRITION DIAGNOSIS:  [x] Prior Dx:  1. Increased protein-energy needs     EDUCATION:  [] Yes:  [x] Not appropriate/warranted    NUTRITION CARE PLAN:  1. Diet: Glucerna 1.5 @ 45mL x 24hrs providing 1080mL of formula, 1620kcal/day (20kcal/kg), 89g protein/day (1.1g/kg), 820mL free water - based on 80.7kg   -Additional 296kcal/day via Propofol  -Add Prosource TF Free x1/day (90kcal, 15g protein)   -TOTAL: 2006kcal/day (25kcal/kg), 104g protein/day (1.3g/kg) - based on 80.7kg (will increase goal rate as Propofol decreases and respiratory status improves)   2. Multivitamin/mineral supplementation: [id number]    MONITORING AND EVALUATION:   RD remains available upon request and will follow up per protocol.    Malorie Pike, MS, RDN, CDN (Teams)   Available on [facility] TEAMS NUTRITION FOLLOW UP NOTE    PATIENT SEEN FOR: tube feed assessment     SOURCE: [] Patient  [x] Current Medical Record  [x] RN  [] Family/support person at bedside  [x] Patient unavailable/inappropriate  [x] Other: Team     CHART REVIEWED/EVENTS NOTED  [x] Nutrition Status:  -S/p OLT (11/15)   -Re-intubated in setting of aspiration ()   -OGT in place     DIET ORDER:   Diet, NPO:   Except Medications (-)    ANTHROPOMETRICS:  Drug Dosing Weight  Height (cm): 182.9 (15 Nov 2024 05:30)  Weight (kg): 93.6 (15 Nov 2024 05:30)  BMI (kg/m2): 28 (15 Nov 2024 05:30)  Daily Weight in k.6 (11-15), Weight in k.6 ()     NUTRITIONALLY PERTINENT MEDICATIONS:  MEDICATIONS  (STANDING):  calcium carbonate 1250 mG  + Vitamin D (OsCal 500 + D)  fluconAZOLE IVPB  insulin glargine Injectable (LANTUS)  insulin lispro (ADMELOG) corrective regimen sliding scale  metoprolol tartrate Injectable  naloxegol  pantoprazole  Injectable  piperacillin/tazobactam IVPB..  predniSONE   Tablet  sodium chloride 0.9%.  trimethoprim   80 mG/sulfamethoxazole 400 mG  ursodiol Suspension  valGANciclovir       NUTRITIONALLY PERTINENT LABS:   Na133 mmol/L[L] Glu 194 mg/dL[H] K+ 3.2 mmol/L[L] Cr  1.17 mg/dL BUN 74 mg/dL[H]  Phos 5.8 mg/dL[H]  Alb 2.9 g/dL[L] ALT 77 U/L[H] AST 22 U/L Alkaline Phosphatase 114 U/L            Finger Sticks:  POCT Blood Glucose.: 209 mg/dL ( @ 05:48)  POCT Blood Glucose.: 182 mg/dL ( @ 00:59)  POCT Blood Glucose.: 166 mg/dL ( @ 22:20)  POCT Blood Glucose.: 222 mg/dL ( @ 11:43)      NUTRITIONALLY PERTINENT MEDICATIONS/LABS:  [x] Reviewed  [x] Relevant notes on medications/labs:  -16 units long acting, sliding scale insulin   -Prednisone   -Propofol: 296kcal/day   -Immunosuppression held     EDEMA:  [x] Reviewed  [] Relevant notes:    GI/ I&O:  [x] Reviewed  -OGT to suction --> 0cc out thus far   -x2 ALYSSA drains   -Last BM documented     SKIN:   [x] No pressure injuries documented, per nursing flowsheet    ESTIMATED NEEDS:  [x] No change:  Energy:   2178-2582kcal/day (27-32 kcal/kg)  Protein:   97-121g/day (1.2-1.5 g/kg)  Fluid:   ml/day or [x] defer to team  Based on: 80.7kg   [place]: 1750kcal/day --> reference while intubated     NUTRITION DIAGNOSIS:  [x] Prior Dx:  1. Increased protein-energy needs     EDUCATION:  [] Yes:  [x] Not appropriate/warranted    NUTRITION CARE PLAN:  1. Diet: Glucerna 1.5 @ 45mL x 24hrs providing 1080mL of formula, 1620kcal/day (20kcal/kg), 89g protein/day (1.1g/kg), 820mL free water - based on 80.7kg   -Additional 296kcal/day via Propofol  -Add Prosource TF Free x1/day (90kcal, 15g protein)   -TOTAL: 2006kcal/day (25kcal/kg), 104g protein/day (1.3g/kg) - based on 80.7kg (will increase goal rate as Propofol decreases and respiratory status improves)     If Propofol is d/samantha:   Glucerna 1.5 @ 55mL x 24hrs providing 1320mL of formula, 1980kcal/day (25kcal/kg), 109g protein/day (1.35g/kg), 1002mL of free water - based on 80.7kg (will increase goal rate as respiratory status improves)   -Defer free water to Team   2. Multivitamin/mineral supplementation: [id number]    MONITORING AND EVALUATION:   RD remains available upon request and will follow up per protocol.    Malorie Pike, MS, RDN, CDN (Teams)   Available on [facility] TEAMS

## 2024-11-21 NOTE — PROGRESS NOTE ADULT - ASSESSMENT
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 Post-op course c/b worsening mental status and re-intubation 11/20 for acute hypoxemic respiratory failure 2/2 aspiration.     PLAN:    Neuro:  - On prop gtt   - takes xanax at homeBenzo taper per neuro   - CT head 11/19 negative  - pain control: oxy PRN       Resp:  - Intubated 11/20 2/2 acute hypoxemic respiratory failure 2/2 aspiration   - PRVC 14/540/8/60  - Home budesonide & duonebs      CV:  - off pressors   - goal MAP > 65 mmHg  - metoprolol 50 TID  - avoid dilt gtt in future 2/2 tacro interference    GI:   - NPO w/ OGT to suction   - Bowel regimen with senna & Miralax  - Protonix for stress ulcer prophylaxis  - ALYSSA x3, monitor output  - post-op liver doppler w/ patent vasculature     Renal:  - Monitor I&Os and electrolytes w/ repletions as necessary  - NS @30 for MORAIMA   - voids     Heme:  - Monitor CBC and coags  - SQH for VTE prophylaxis  - ASA for HA thrombosis prevention    ID:   - Monitor for clinical evidence of active infection  - Monitor WBC, temperature, and procalcitonin  - Transplant ppx w/ bactrim, valcyte, fluconazole  - immunosuppression w/ steroids, tacro and Cellcept     Endo:   - Monitor glucose   - lantus 16u  - ISS  - post-transplant steroid taper     Code Status: full     Disposition: SICU    Nadia Taveras PA-C     [id number] ASSESSMENT: 72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 Post-op course c/b worsening mental status and re-intubation 11/20 for acute hypoxemic respiratory failure 2/2 aspiration.     PLAN:    Neuro:  - Sedation w/ prop gtt   - pain control w/ Dilaudid   - takes xanax 2mg TID at home, benzo taper   - CT head 11/19 negative  - holding home xanax, Abilify, Zoloft, Remeron Lexapro while NPO    Resp:  - Intubated 11/20 2/2 acute hypoxemic respiratory failure 2/2 aspiration   - PRVC 14/540/8/60  - Home budesonide & duonebs      CV:  - off pressors   - goal MAP > 65 mmHg  - metoprolol 50 TID  - avoid dilt gtt in future 2/2 tacro interference    GI:   - NPO w/ OGT to suction   - Bowel regimen with senna & Miralax; Movantix qd x 3 days  - Protonix for stress ulcer prophylaxis  - ALYSSA x3, monitor output  - post-op liver doppler w/ patent vasculature     Renal:  - Monitor I&Os and electrolytes w/ repletions as necessary  - NS @30 for MORAIMA   - voids     Heme:  - Monitor CBC and coags  - SQH for VTE prophylaxis  - ASA for HA thrombosis prevention    ID:   - Monitor for clinical evidence of active infection  - Monitor WBC, temperature, and procalcitonin  - Transplant ppx w/ bactrim, valcyte, fluconazole  - immunosuppression w/ steroids, tacro and Cellcept     Endo:   - Monitor glucose   - lantus 16u  - ISS  - post-transplant steroid taper     Code Status: full     Disposition: SICU    Nadia Taveras PA-C     [id number]

## 2024-11-21 NOTE — PROGRESS NOTE ADULT - ATTENDING COMMENTS
I have seen and examined this patient on multidisciplinary SICU rounds this morning. I have reviewed all new labs, imaging and reports. 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 Post op course complicated by AF RVR, hypoxi    24 hours events:   Acute hypoxia after possible aspiration event, reintubated 11/20   iSTOP xanax 2mg TID   Started keppra 500 mg BID   Started ativan 1mg TID x3 days, then ativan 1mg BID, then off  PRVC 14 540 PEEP 8 FiO2 50%   AXR with dilated small and large bowel concerning for ileus. stopping all feeds and PO meds. Started on movantik.   Plan for CT head/chest/abd/pelvis today    I/Os   UOP 1200  Net -1300    Neuro: propofol for sedation, following commands. Started keppra 500 mg BID, Started ativan 1mg TID x3 days, then ativan 1mg BID, then off.  vEEG 11/20, will follow up with neurology. Holding home abilify and remeron. transplant psych following  Resp: reintubated 11/20, CXR possible RLL consolidation, minimal secretions  CV: AFib RVR intermittently up to 120s. switch to IV metop 5 q4, holding nifedipine 60  GI: AXR with dilated loops of bowel consistent with ileus. Started on movantik. Immunosuppression per hepatology; LFTs downtrending. Ammonia 23  Renal: increasing Cr, hyponatremia improving. Hold diuresis today. Castro.   Heme: SQH BID, rectal ASA while NPO  ID: fluc/bactrim/valcyte ppx, blood cx 11/20 pending, zosyn (11/20- ), procal 0.23, trend q48 hours. WBC down to13  Endo: lantus 16u with ISS while NPO  Lines: KO, PIVs     The patient required critical care. Critical care time was indicated due to the patient's inherent instability and high risk for decompensation. E & M work was done by me in multiple 10-15 minute intervals over the course of the day in the ICU. I have coordinated care with multiple teams, and reviewed the medical record, consult notes, ICU flow sheets, cardiac monitoring, mechanical ventilation, medication record, brain and body imaging, and serial sequential labs.           Total time spent in the care of this patient today (excluding procedures): 40 minutes                    Over 50% of the total time was spent in discussion and coordination of care with consulting services, dietary and rehab services.      Whit Varela MD  Trauma and Critical Care

## 2024-11-21 NOTE — PROGRESS NOTE ADULT - NS ATTEND AMEND GEN_ALL_CORE FT
POD6 -- resp distress yesterday, intubated after SpO2 <80  Propofol GTT for sedation.  Afib improved control.  Will place NGT  Cultures pending -- start Zosyn  Immuno: tac held, hold cellcept, steroid taper.  [id number], SQH.  Ativan taper BID

## 2024-11-21 NOTE — PROGRESS NOTE ADULT - ASSESSMENT
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 No reported focal neurologic deficits now or prior, so doubt cerebrovascular event. Could also be related to toxicity from immunosuppressant medications but less likely. CT head, personally reviewed by me, with small vessel ischemic changes and atrophy but no acute intracranial findings. 11/21 - Mentation improving as sedation decreased. No further seizures noted. Some slight L > R UE weakness but unclear if significant or not  - vEEG with mild generalized slowing but no evidence for focal slowing, epileptiform discharges, or seizures. PLEASE STOP  - Lorazepam 3mg IV [id number] and dec by 0.5mg IV [id number] every two days until OFF  - No other ASM's needed at this time  - Minimize sedation with propofol and barbiturates (Precedex, fentanyl, hydromorphone OK)  - Await labs for additional causes with folate, B1, B6, UA, WNV, Lyme  - Vitamin D levels low, would replete with D2 80229 Units PO weekly for 8 weeks and then recheck new levels  - Can decided on utility of further work-up, such as MRI brain or LP, depending on clinical course and results of above studies  - Above discussed with SICU team, who verbalized agreement and understanding  - Continue to address above medical and surgical issues, as you are doing  - Will continue to follow patient with you

## 2024-11-21 NOTE — PROGRESS NOTE ADULT - ASSESSMENT
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  Stable perihepatic fluid collection measuring 4.5 x 1.9 x 3.4 cm.   -diet NPO  -PT/OT/SCDs/spirometry  -bowel regimen as able  -ASA/SQH  -leukocytosis and fever curve, likely reactive-- improving: cultures pending- on Zosyn  -abdominal xray 11/20: large gastric bubble  -Lasix 40 IV x 1  -f/u CT abd and pelvis   -f/u CT head,   -Reglan 10mg q8 after CT     []AMS   -reintubated: On propofol GTT for sedation   -Ativan 1mg BID x 3 days   -EEG 11/20: Mild diffuse cerebral dysfunction is nonspecific in etiology. No epileptiform abnormalities or seizures.  -Home psych medications restarted, Transplant Psych following; agitated, dc remeron/abilify, start seroquel    Immunosuppression  -FK per level (HELD), MMF HELD, SST  -SIMULECT #2 on POD#4  -PPx: Valcyte/Bactrim/PPI/OsCal/Fluc    HTN/ pAFib  -Metroprolol 5 mg IVP q6 hr, Nifedipine, adjust prn.   - Dr. 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  Stable perihepatic fluid collection measuring 4.5 x 1.9 x 3.4 cm.   -diet NPO  -PT/OT/SCDs/spirometry  -bowel regimen as able  -ASA/SQH  -leukocytosis and fever curve, likely reactive-- improving: cultures pending- on Zosyn  -abdominal xray 11/20: large gastric bubble  -Lasix 40 IV x 1  -f/u CT abd and pelvis   -f/u CT head,   -Reglan 10mg q8 after CT     []AMS   -reintubated: On propofol GTT for sedation   -Ativan taper 1mg BID x 3 days, per family was on higher dose Xanax PO   -EEG 11/20: Mild diffuse cerebral dysfunction is nonspecific in etiology. No epileptiform abnormalities or seizures.  -Home psych medications restarted, Transplant Psych following; agitated, dc remeron/abilify, start seroquel    Immunosuppression  -FK per level (HELD), MMF HELD, SST  -SIMULECT #2 on POD#4  -PPx: Valcyte/Bactrim/PPI/OsCal/Fluc    HTN/ pAFib  -Metroprolol 5 mg IVP q6 hr, Nifedipine, adjust prn.   - Dr. Quintanilla aware, on diltazem gtt d/c'ed  - Repeat EKG   - CT head neg    DM  -Lantus/Lispro, adjust prn  -Endocrine as needed given steroids

## 2024-11-21 NOTE — PROGRESS NOTE ADULT - SUBJECTIVE AND OBJECTIVE BOX
HPI:  Patient seen and examined at bedside in SICU with son (vascular surgeon) at bedside.  Intubated since yesterday.    Review Of Systems:     Unable to assess while intubated, sedated       Medications:  albuterol/ipratropium for Nebulization 3 milliLiter(s) Nebulizer every 6 hours  aspirin Suppository 300 milliGRAM(s) Rectal daily  buDESOnide    Inhalation Suspension 0.5 milliGRAM(s) Inhalation two times a day  calcium carbonate 1250 mG  + Vitamin D (OsCal 500 + D) 1 Tablet(s) Oral two times a day  chlorhexidine 0.12% Liquid 15 milliLiter(s) Oral Mucosa every 12 hours  chlorhexidine 2% Cloths 1 Application(s) Topical <User Schedule>  dexMEDEtomidine Infusion 1.5 MICROgram(s)/kG/Hr IV Continuous <Continuous>  fluconAZOLE IVPB 400 milliGRAM(s) IV Intermittent every 24 hours  heparin   Injectable 5000 Unit(s) SubCutaneous every 12 hours  HYDROmorphone  Injectable 1 milliGRAM(s) IV Push every 3 hours PRN  insulin glargine Injectable (LANTUS) 18 Unit(s) SubCutaneous at bedtime  insulin lispro (ADMELOG) corrective regimen sliding scale   SubCutaneous every 6 hours  levETIRAcetam  IVPB 500 milliGRAM(s) IV Intermittent every 12 hours  LORazepam   Injectable 1 milliGRAM(s) IV Push every 8 hours  metoprolol tartrate Injectable 5 milliGRAM(s) IV Push every 4 hours  naloxegol 12.5 milliGRAM(s) Oral <User Schedule>  neostigmine Injectable 2 milliGRAM(s) IV Push once  neostigmine Injectable 2 milliGRAM(s) IV Push once  norepinephrine Infusion 0.04 MICROgram(s)/kG/Min IV Continuous <Continuous>  pantoprazole  Injectable 40 milliGRAM(s) IV Push daily  piperacillin/tazobactam IVPB.. 3.375 Gram(s) IV Intermittent every 8 hours  predniSONE   Tablet 20 milliGRAM(s) Oral daily  sodium chloride 0.9% 1000 milliLiter(s) IV Continuous <Continuous>  sodium chloride 3%  Inhalation 4 milliLiter(s) Inhalation every 6 hours  trimethoprim   80 mG/sulfamethoxazole 400 mG 1 Tablet(s) Oral daily  ursodiol Suspension 300 milliGRAM(s) Oral two times a day  valGANciclovir 450 milliGRAM(s) Oral daily    PAST MEDICAL & SURGICAL HISTORY:  Diabetes      Transaminitis      Paroxysmal atrial fibrillation      Depression      BPH (benign prostatic hyperplasia)      Hypertension      Chronic atrial fibrillation      Coronary artery disease      Hepatocellular carcinoma      DM (diabetes mellitus)      HTN (hypertension)      Paroxysmal atrial fibrillation      Cirrhosis      HCC (hepatocellular carcinoma)      History of BPH      History of laparoscopic cholecystectomy      History of lumbar laminectomy      H/O prior ablation treatment      H/O percutaneous left heart catheterization        Vitals:  T(C): 38 (11-21-24 @ 23:00), Max: 38.1 (11-21-24 @ 21:00)  HR: 99 (11-22-24 @ 00:00) (94 - 128)  BP: 99/61 (11-22-24 @ 00:00) (69/50 - 128/69)  BP(mean): 74 (11-22-24 @ 00:00) (56 - 646)  RR: 19 (11-22-24 @ 00:00) (17 - 28)  SpO2: 100% (11-22-24 @ 00:00) (93% - 100%)  Wt(kg): --  Daily     Daily   I&O's Summary    20 Nov 2024 07:01  -  21 Nov 2024 07:00  --------------------------------------------------------  IN: 2276.9 mL / OUT: 3075 mL / NET: -798.1 mL    21 Nov 2024 07:01  -  22 Nov 2024 00:50  --------------------------------------------------------  IN: 2005.7 mL / OUT: 2340 mL / NET: -334.3 mL        Physical Exam:  Appearance: Intubated, sedated  Eyes: PERRL, EOMI, pink conjunctiva  HENT: +ET tube  Cardiovascular: irregular S1, S2  Respiratory: Clear to auscultation bilaterally  Gastrointestinal: soft, non-tender, non-distended with normal bowel sounds  Musculoskeletal: No clubbing; no joint deformity                           9.7    13.93 )-----------( 146      ( 21 Nov 2024 05:48 )             30.2     11-21    133[L]  |  99  |  74[H]  ----------------------------<  194[H]  3.2[L]   |  19[L]  |  1.17    Ca    7.8[L]      21 Nov 2024 05:49  Phos  5.8     11-21  Mg     2.8     11-21    TPro  5.0[L]  /  Alb  2.9[L]  /  TBili  0.9  /  DBili  x   /  AST  22  /  ALT  77[H]  /  AlkPhos  114  11-21    PT/INR - ( 21 Nov 2024 05:50 )   PT: 15.9 sec;   INR: 1.40 ratio         PTT - ( 21 Nov 2024 05:50 )  PTT:23.3 sec        Cardiovascular Diagnostic Testing:  ECG: sinus rhythm    Echo: < from: Intra-Operative Transesophageal Echo W or WO Ultrasound Enhancing Agent (11.15.24 @ 07:46) >  --------------------------------------------------------------------------------  PRE-BYPASS FINDINGS     Left Ventricle:  Left ventricular ejection fraction is estimated at 60 to 65%. Normal left ventricularwall thickness. The left ventricular systolic function is normal There are no regional wall motion abnormalities seen.     Right Ventricle:  The right ventricular cavity is normal in size, with normal wall thickness and right ventricular systolic function is normal. Right ventricular systolic function is normal.     Left Atrium:  The left atrium is normal in size. No thrombus visualized in left atrial appendage.     Right Atrium:  The right atrium is normal in size. The right atrium is normal in size.     Interatrial Septum:  No PFO visualized with color flow doppler.     Aortic Valve:  The aortic valve appears trileaflet. There is no aortic valve stenosis. There is trace aortic regurgitation.     Mitral Valve:  There is no mitral valve stenosis. There is no mitral valve stenosis. There is trace mitral regurgitation.     Tricuspid Valve:  There is no evidence of tricuspid stenosis. There is trace tricuspid regurgitation.     Pericardium:  No pericardial effusion seen.     Post-Bypass:  S/P OLT. Under current loading conditions, hyperdynamic left ventricular systolic function, EF: 70-75% by visual estimate, no RWMA. Normal right ventricular systolic function. All other findings unchanged. Probe removed atraumatically, no blood. The patient tolerated the procedure well and without complications. Permanent recorded images are stored in the medical record.     Electronically signed by James Villareal on 11/15/2024 at 2:18:16 PM         *** Final ***    < end of copied text >      Stress Testing:     Cath: 4/24/2024, patient had his LHC repeated with Ben [facility]al MD at [facility].  Cath showed multivessel coronary artery disease, but the lesions previously noted were FFR negative.  He continues to have MIRTA 3 flow throughout.    Interpretation of Telemetry: AFib with RVR    Imaging:

## 2024-11-21 NOTE — PROGRESS NOTE ADULT - SUBJECTIVE AND OBJECTIVE BOX
Transplant Surgery - Multidisciplinary Progress Note  --------------------------------------------------------------  OLT   11/15/2024        POD#6    Present: Patient seen and examined with multidisciplinary Transplant team including (Surgery: Dr. Hutchinson. Hepatology: JAZZ Orourke Singh, SICU team in AM rounds. Disciplines not in attendance will be notified of the plan.     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    Interval Events:  - Febrile (100.4F)  - afib w/ RVR found on tele, Metroprolol 5 mg IVP q6 hr  - Respiratory distress on vent FIO2 60% and PEEP 8  - good graft function: LFT downtrending     Immunosuppression:  -FK per level, MMF 1/1, SST  -ongoing monitoring for signs of rejection    Potential Discharge date: Pending clinical course    MEDICATIONS  (STANDING):  albuterol/ipratropium for Nebulization 3 milliLiter(s) Nebulizer every 6 hours  aspirin Suppository 300 milliGRAM(s) Rectal daily  buDESOnide    Inhalation Suspension 0.5 milliGRAM(s) Inhalation two times a day  calcium carbonate 1250 mG  + Vitamin D (OsCal 500 + D) 1 Tablet(s) Oral two times a day  chlorhexidine 0.12% Liquid 15 milliLiter(s) Oral Mucosa every 12 hours  chlorhexidine 2% Cloths 1 Application(s) Topical <User Schedule>  fluconAZOLE IVPB 400 milliGRAM(s) IV Intermittent every 24 hours  heparin   Injectable 5000 Unit(s) SubCutaneous every 12 hours  insulin glargine Injectable (LANTUS) 16 Unit(s) SubCutaneous at bedtime  insulin lispro (ADMELOG) corrective regimen sliding scale   SubCutaneous every 6 hours  levETIRAcetam  IVPB 500 milliGRAM(s) IV Intermittent every 12 hours  LORazepam   Injectable 1 milliGRAM(s) IV Push every 8 hours  metoprolol tartrate Injectable 5 milliGRAM(s) IV Push every 6 hours  naloxegol 12.5 milliGRAM(s) Oral <User Schedule>  pantoprazole  Injectable 40 milliGRAM(s) IV Push daily  piperacillin/tazobactam IVPB.. 3.375 Gram(s) IV Intermittent every 8 hours  predniSONE   Tablet 20 milliGRAM(s) Oral daily  propofol Infusion 30 MICROgram(s)/kG/Min (16.8 mL/Hr) IV Continuous <Continuous>  sodium chloride 0.9%. 1000 milliLiter(s) (30 mL/Hr) IV Continuous <Continuous>  sodium chloride 3%  Inhalation 4 milliLiter(s) Inhalation every 6 hours  trimethoprim   80 mG/sulfamethoxazole 400 mG 1 Tablet(s) Oral daily  ursodiol Suspension 300 milliGRAM(s) Oral two times a day  valGANciclovir 450 milliGRAM(s) Oral daily    MEDICATIONS  (PRN):  HYDROmorphone  Injectable 1 milliGRAM(s) IV Push every 3 hours PRN Breakthrough Pain    PAST MEDICAL & SURGICAL HISTORY:  Diabetes    Transaminitis    Paroxysmal atrial fibrillation    Depression    BPH (benign prostatic hyperplasia)    Hypertension    Chronic atrial fibrillation    Coronary artery disease    Hepatocellular carcinoma    DM (diabetes mellitus)    HTN (hypertension)    Paroxysmal atrial fibrillation    Cirrhosis    HCC (hepatocellular carcinoma)    History of BPH    History of laparoscopic cholecystectomy    History of lumbar laminectomy    H/O prior ablation treatment    H/O percutaneous left heart catheterization    Vital Signs Last 24 Hrs  T(C): 38 (21 Nov 2024 11:00), Max: 38 (21 Nov 2024 11:00)  T(F): 100.4 (21 Nov 2024 11:00), Max: 100.4 (21 Nov 2024 11:00)  HR: 128 (21 Nov 2024 14:00) (99 - 128)  BP: 119/58 (21 Nov 2024 14:00) (90/53 - 128/69)  BP(mean): 84 (21 Nov 2024 14:00) (65 - 646)  RR: 20 (21 Nov 2024 14:00) (16 - 28)  SpO2: 93% (21 Nov 2024 14:00) (93% - 100%)    Parameters below as of 21 Nov 2024 11:36  Patient On (Oxygen Delivery Method): ventilator    I&O's Summary    20 Nov 2024 07:01  -  21 Nov 2024 07:00  --------------------------------------------------------  IN: 2276.9 mL / OUT: 3075 mL / NET: -798.1 mL    21 Nov 2024 07:01  -  21 Nov 2024 14:55  --------------------------------------------------------  IN: 630 mL / OUT: 955 mL / NET: -325 mL                          9.7    13.93 )-----------( 146      ( 21 Nov 2024 05:48 )             30.2     11-21    133[L]  |  99  |  74[H]  ----------------------------<  194[H]  3.2[L]   |  19[L]  |  1.17    Ca    7.8[L]      21 Nov 2024 05:49  Phos  5.8     11-21  Mg     2.8     11-21    TPro  5.0[L]  /  Alb  2.9[L]  /  TBili  0.9  /  DBili  x   /  AST  22  /  ALT  77[H]  /  AlkPhos  114  11-21    Tacrolimus (), Serum: 1.0 ng/mL (11-21 @ 05:48)    Culture - Body Fluid with Gram Stain (collected 11-15-24 @ 20:13)  Source: Body Fluid  Gram Stain (11-16-24 @ 06:12):    No polymorphonuclear leukocytes seen    No organisms seen    by cytocentrifuge  Final Report (11-21-24 @ 08:50):    No growth at 5 days    Review of systems  All other systems were reviewed and are negative, except as noted.    PHYSICAL EXAM:  Constitutional: Well developed / well nourished  Eyes:  PERRLA  ENMT: nc/at, no thrush  Neck: supple   Respiratory: CTA B/L  Cardiovascular: RRR  Gastrointestinal: +Distended abdomen, appropriate incisional TTP. chevron incision c/d/i, staples in place. No signs of infection.  JPx3 ss  Genitourinary: Voiding spontaneously   Extremities: SCD's in place and working bilaterally  Neurological: A&O x0  Skin: no rashes, ulcerations, lesions  Psychiatric: Responsive

## 2024-11-21 NOTE — BH CONSULTATION LIAISON PROGRESS NOTE - MSE UNSTRUCTURED FT
Patient on exam was intubated, sedated, minimally arousable to physical stimuli, unable to participate in exam

## 2024-11-22 NOTE — PROGRESS NOTE ADULT - ASSESSMENT
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 Pt underwent Y90 Sept, 2023 with favorable treatment response. Pt completed OLTx evaluation and listed for OLTx 5/03/2024. Now s/p OLT on 11/15/24. EP consulted for post-op Afib with RVR to 140's which started on 11/19/24 at 2:12am.      Plan  -recommend esmolol gtt, uptitrate as tolerated to goal HR <100  -if esmolol is ineffective, can switch to diltiazem gtt   -IV digoxin 500 mcg x 1, then IV digoxin 250 mcg qd; today's CrCl: 76  -follow up blood cultures, manage underlying infections, maintain euvolemia, manage hyperglycemia  -if he does not spontaneously convert, patient will ultimately require AYDEN/DCCV once he is cleared to start full dose anticoagulation. Will need the transplant team to state when he is cleared for this.  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 Pt underwent Y90 Sept, 2023 with favorable treatment response. Pt completed OLTx evaluation and listed for OLTx 5/03/2024. Now s/p OLT on 11/15/24. EP consulted for post-op Afib with RVR to 140's which started on 11/19/24 at 2:12am.      Plan  -recommend esmolol gtt, uptitrate as tolerated to goal HR <100  -if esmolol is ineffective, can switch to diltiazem gtt   -IV digoxin 500 mcg x 1, then IV digoxin 250 mcg 8 hours later, then qd; today's CrCl: 76  -follow up blood cultures, manage underlying infections, maintain euvolemia, manage hyperglycemia  -if he does not spontaneously convert, patient will ultimately require AYDEN/DCCV once he is cleared to start full dose anticoagulation. Will need the transplant team to state when he is cleared for this.  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 Pt underwent Y90 Sept, 2023 with favorable treatment response. Pt completed OLTx evaluation and listed for OLTx 5/03/2024. Now s/p OLT on 11/15/24. EP consulted for post-op Afib with RVR to 140's which started on 11/19/24 at 2:12am.      Plan  -recommend esmolol gtt, uptitrate as tolerated to goal HR <100  -if esmolol is ineffective, can switch to diltiazem gtt   -IV digoxin 500 mcg x 1, then IV digoxin 250 mcg 8 hours later; today's CrCl: 76  -follow up blood cultures, manage underlying infections, maintain euvolemia, manage hyperglycemia  -if he does not spontaneously convert, patient will ultimately require AYDEN/DCCV once he is cleared to start full dose anticoagulation. Will need the transplant team to state when he is cleared for this.

## 2024-11-22 NOTE — CONSULT NOTE ADULT - SUBJECTIVE AND OBJECTIVE BOX
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 Pt underwent Y90 Sept, 2023 with favorable treatment response.Pt completed OLTx evaluation and listed for OLTx 5/03/2024    (15 Nov 2024 00:03)      PAST MEDICAL & SURGICAL HISTORY:  Diabetes      Transaminitis      Paroxysmal atrial fibrillation      Depression      BPH (benign prostatic hyperplasia)      Hypertension      Chronic atrial fibrillation      Coronary artery disease      Hepatocellular carcinoma      DM (diabetes mellitus)      HTN (hypertension)      Paroxysmal atrial fibrillation      Cirrhosis      HCC (hepatocellular carcinoma)      History of BPH      History of laparoscopic cholecystectomy      History of lumbar laminectomy      H/O prior ablation treatment      H/O percutaneous left heart catheterization          SOCIAL:    FAMILY HISTORY:  Family history of coronary artery disease (Father, Sibling, Sibling)    Family history of diabetes mellitus (Father, Mother)    Family history of coronary artery disease (Sibling)      REVIEW OF SYSTEMS  General:	Denies any malaise fatigue or chills. Fevers absent    Skin:No rash  	  Ophthalmologic:Denies any visual complaints,discharge redness or photophobia  	  ENMT:No nasal discharge,headache,sinus congestion or throat pain.No dental complaints    Respiratory and Thorax:No cough,sputum or chest pain.Denies shortness of breath  	  Cardiovascular:	No chest pain,palpitaions or dizziness    Gastrointestinal:	NO nausea,abdominal pain or diarrhea.    Genitourinary:	No dysuria,frequency. No flank pain    Musculoskeletal:	No joint swelling or pain.No weakness    Neurological:No confusion,diziness.No extremity weakness.No bladder or bowel incontinence	    Psychiatric:No delusions or hallucinations	    Hematology/Lymphatics:	No LN swelling.No gum bleeding     Endocrine:	No recent weight gain or loss.No abnormal heat/cold intolerance    Allergic/Immunologic:	No hives or rash   Allergies    No Known Allergies    Intolerances        ANTIMICROBIALS:    fluconAZOLE IVPB 400 milliGRAM(s) IV Intermittent every 24 hours  piperacillin/tazobactam IVPB.. 3.375 Gram(s) IV Intermittent every 8 hours  trimethoprim  40 mG/sulfamethoxazole 200 mG Suspension 80 milliGRAM(s) Enteral Tube daily  valGANciclovir 50 mG/mL Oral Solution 450 milliGRAM(s) Oral daily      Vital Signs Last 24 Hrs  T(C): 37.5 (22 Nov 2024 07:00), Max: 38.1 (21 Nov 2024 21:00)  T(F): 99.5 (22 Nov 2024 07:00), Max: 100.6 (21 Nov 2024 21:00)  HR: 123 (22 Nov 2024 14:00) (86 - 132)  BP: 116/59 (22 Nov 2024 13:00) (69/50 - 150/65)  BP(mean): 79 (22 Nov 2024 13:00) (56 - 94)  RR: 24 (22 Nov 2024 14:00) (17 - 31)  SpO2: 95% (22 Nov 2024 14:00) (95% - 100%)    Parameters below as of 22 Nov 2024 05:02  Patient On (Oxygen Delivery Method): ventilator        PHYSICAL EXAM:Pleasant patient in no acute distress.      Constitutional:Comfortable.Awake and alert  No cachexia     Eyes:PERRL EOMI.NO discharge or conjunctival injection    ENMT:No sinus tenderness.No thrush.No pharyngeal exudate or erythema.Fair dental hygiene    Neck:Supple,No LN,no JVD      Respiratory:Good air entry bilaterally,CTA    Cardiovascular:S1 S2 wnl, No murmurs,rub or gallops    Gastrointestinal:Soft BS(+) no tenderness no masses ,No rebound or guarding    Genitourinary:No CVA tendereness     Rectal:    Extremities:No cyanosis,clubbing or edema.    Vascular:peripheral pulses felt    Neurological:AAO X 3,No grossly focal deficits    Skin:No rash     Lymph Nodes:No palpable LNs    Musculoskeletal:No joint swelling or LOM    Psychiatric:Affect normal.                              10.2   13.66 )-----------( 198      ( 22 Nov 2024 05:38 )             32.4       11-22    138  |  105  |  62[H]  ----------------------------<  204[H]  4.1   |  20[L]  |  1.13    Ca    7.0[L]      22 Nov 2024 05:38  Phos  3.0     11-22  Mg     2.8     11-22    TPro  4.6[L]  /  Alb  2.9[L]  /  TBili  1.1  /  DBili  x   /  AST  21  /  ALT  55[H]  /  AlkPhos  100  11-22      RECENT CULTURES:  11-20 @ 11:53  .Blood BLOOD  --  --  --    No growth at 24 hours  --  11-20 @ 11:43  .Blood BLOOD  --  --  --    No growth at 24 hours  --  11-15 @ 20:13  Body Fluid  --  --  --    No growth at 5 days  --      RADIOLOGY:  < from: CT Head No Cont (11.21.24 @ 17:10) >  IMPRESSION:  No evidence of acute transcortical infarct, acute intracranial   hemorrhage, or mass effect.    < end of copied text >  < from: CT Chest w/ IV Cont (11.21.24 @ 17:09) >  IMPRESSION:  Bilateral lower lobe consolidation with fluid and debris in the right   lower lobe bronchi, concerning for aspiration pneumonia.    Small right pleural effusion.    Postoperative changes status post liver transplant. No evidence of   drainable intra-abdominal fluid collection.    Fluid and gaseous distention of the colon, likely ileus.    < end of copied text >      IMPRESSION:    < from: CT Abdomen and Pelvis w/ Oral Cont and w/ IV Cont (11.21.24 @ 17:09) >  IMPRESSION:  Bilateral lower lobe consolidation with fluid and debris in the right   lower lobe bronchi, concerning for aspiration pneumonia.    Small right pleural effusion.    Postoperative changes status post liver transplant. No evidence of   drainable intra-abdominal fluid collection.    Fluid and gaseous distention of the colon, likely ileus.    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 Pt underwent Y90 Sept, 2023 with favorable treatment response.Pt completed OLTx evaluation and listed for OLTx 5/03/2024    (15 Nov 2024 00:03)      PAST MEDICAL & SURGICAL HISTORY:  Diabetes      Transaminitis      Paroxysmal atrial fibrillation      Depression      BPH (benign prostatic hyperplasia)      Hypertension      Chronic atrial fibrillation      Coronary artery disease      Hepatocellular carcinoma      DM (diabetes mellitus)      HTN (hypertension)      Paroxysmal atrial fibrillation      Cirrhosis      HCC (hepatocellular carcinoma)      History of BPH      History of laparoscopic cholecystectomy      History of lumbar laminectomy      H/O prior ablation treatment      H/O percutaneous left heart catheterization          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 3.375 Gram(s) IV Intermittent every 8 hours  trimethoprim  40 mG/sulfamethoxazole 200 mG Suspension 80 milliGRAM(s) Enteral Tube daily  valGANciclovir 50 mG/mL Oral Solution 450 milliGRAM(s) Oral daily      Vital Signs Last 24 Hrs  T(C): 37.5 (22 Nov 2024 07:00), Max: 38.1 (21 Nov 2024 21:00)  T(F): 99.5 (22 Nov 2024 07:00), Max: 100.6 (21 Nov 2024 21:00)  HR: 123 (22 Nov 2024 14:00) (86 - 132)  BP: 116/59 (22 Nov 2024 13:00) (69/50 - 150/65)  BP(mean): 79 (22 Nov 2024 13:00) (56 - 94)  RR: 24 (22 Nov 2024 14:00) (17 - 31)  SpO2: 95% (22 Nov 2024 14:00) (95% - 100%)    Parameters below as of 22 Nov 2024 05:02  Patient On (Oxygen Delivery Method): ventilator        PHYSICAL EXAM: non interactive      Constitutional:Comfortable.  No cachexia     Eyes:PERRL EOMI.NO discharge or conjunctival injection    ENMT:No sinus tenderness.No thrush.ET tube    Neck:Supple,No LN,no JVD      Respiratory: diminished at both bases    Cardiovascular:S1 S2 wnl, No murmurs,rub or gallops tachy    Gastrointestinal: distended  OLTx wound CDI 2 drains with serosanguinous output    Genitourinary: nichols    Rectal:    Extremities:No cyanosis,clubbing or edema.    Vascular:peripheral pulses felt    Neurological: sedated non interacitve    Skin:No rash     Lymph Nodes:No palpable LNs    Musculoskeletal:No joint swelling or LOM    Psychiatric:Affect normal.                              10.2   13.66 )-----------( 198      ( 22 Nov 2024 05:38 )             32.4       11-22    138  |  105  |  62[H]  ----------------------------<  204[H]  4.1   |  20[L]  |  1.13    Ca    7.0[L]      22 Nov 2024 05:38  Phos  3.0     11-22  Mg     2.8     11-22    TPro  4.6[L]  /  Alb  2.9[L]  /  TBili  1.1  /  DBili  x   /  AST  21  /  ALT  55[H]  /  AlkPhos  100  11-22      RECENT CULTURES:  11-20 @ 11:53  .Blood BLOOD  --  --  --    No growth at 24 hours  --  11-20 @ 11:43  .Blood BLOOD  --  --  --    No growth at 24 hours  --  11-15 @ 20:13  Body Fluid  --  --  --    No growth at 5 days  --      RADIOLOGY:  < from: CT Head No Cont (11.21.24 @ 17:10) >  IMPRESSION:  No evidence of acute transcortical infarct, acute intracranial   hemorrhage, or mass effect.    < end of copied text >  < from: CT Chest w/ IV Cont (11.21.24 @ 17:09) >  IMPRESSION:  Bilateral lower lobe consolidation with fluid and debris in the right   lower lobe bronchi, concerning for aspiration pneumonia.    Small right pleural effusion.    Postoperative changes status post liver transplant. No evidence of   drainable intra-abdominal fluid collection.    Fluid and gaseous distention of the colon, likely ileus.    < end of copied text >      IMPRESSION:    < from: CT Abdomen and Pelvis w/ Oral Cont and w/ IV Cont (11.21.24 @ 17:09) >  IMPRESSION:  Bilateral lower lobe consolidation with fluid and debris in the right   lower lobe bronchi, concerning for aspiration pneumonia.    Small right pleural effusion.    Postoperative changes status post liver transplant. No evidence of   drainable intra-abdominal fluid collection.    Fluid and gaseous distention of the colon, likely ileus.    < end of copied text >

## 2024-11-22 NOTE — PROGRESS NOTE ADULT - SUBJECTIVE AND OBJECTIVE BOX
Transplant Surgery - Multidisciplinary Rounds  --------------------------------------------------------------  OLT   11/15/2024        POD#7    Present: Patient seen and examined with multidisciplinary Transplant team including (Surgery: Dr. Hutchinson. Hepatology: Dr. Delgado, Ascencion Hernández, SICU team in AM rounds. Disciplines not in attendance will be notified of the plan.     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    Interval Events:  -     Immunosuppression:  -FK per level, MMF 1/1, SST  -ongoing monitoring for signs of rejection    Potential Discharge date: Pending clinical course    MEDICATIONS  (STANDING):  albuterol/ipratropium for Nebulization 3 milliLiter(s) Nebulizer every 6 hours  aspirin Suppository 300 milliGRAM(s) Rectal daily  buDESOnide    Inhalation Suspension 0.5 milliGRAM(s) Inhalation two times a day  calcium carbonate 1250 mG  + Vitamin D (OsCal 500 + D) 1 Tablet(s) Oral two times a day  chlorhexidine 0.12% Liquid 15 milliLiter(s) Oral Mucosa every 12 hours  chlorhexidine 2% Cloths 1 Application(s) Topical <User Schedule>  dexMEDEtomidine Infusion 1.5 MICROgram(s)/kG/Hr (35.1 mL/Hr) IV Continuous <Continuous>  fluconAZOLE IVPB 400 milliGRAM(s) IV Intermittent every 24 hours  heparin   Injectable 5000 Unit(s) SubCutaneous every 12 hours  insulin glargine Injectable (LANTUS) 18 Unit(s) SubCutaneous at bedtime  insulin lispro (ADMELOG) corrective regimen sliding scale   SubCutaneous every 6 hours  levETIRAcetam  IVPB 500 milliGRAM(s) IV Intermittent every 12 hours  LORazepam   Injectable 1 milliGRAM(s) IV Push every 8 hours  metoprolol tartrate Injectable 5 milliGRAM(s) IV Push every 4 hours  naloxegol 12.5 milliGRAM(s) Oral <User Schedule>  norepinephrine Infusion 0.04 MICROgram(s)/kG/Min (7.02 mL/Hr) IV Continuous <Continuous>  pantoprazole  Injectable 40 milliGRAM(s) IV Push daily  piperacillin/tazobactam IVPB.. 3.375 Gram(s) IV Intermittent every 8 hours  predniSONE   Tablet 20 milliGRAM(s) Oral daily  sodium chloride 0.9% 1000 milliLiter(s) (75 mL/Hr) IV Continuous <Continuous>  sodium chloride 3%  Inhalation 4 milliLiter(s) Inhalation every 6 hours  trimethoprim   80 mG/sulfamethoxazole 400 mG 1 Tablet(s) Oral daily  ursodiol Suspension 300 milliGRAM(s) Oral two times a day  valGANciclovir 450 milliGRAM(s) Oral daily    MEDICATIONS  (PRN):  HYDROmorphone  Injectable 1 milliGRAM(s) IV Push every 3 hours PRN Breakthrough Pain      PAST MEDICAL & SURGICAL HISTORY:  Diabetes      Transaminitis      Paroxysmal atrial fibrillation      Depression      BPH (benign prostatic hyperplasia)      Hypertension      Chronic atrial fibrillation      Coronary artery disease      Hepatocellular carcinoma      DM (diabetes mellitus)      HTN (hypertension)      Paroxysmal atrial fibrillation      Cirrhosis      HCC (hepatocellular carcinoma)      History of BPH      History of laparoscopic cholecystectomy      History of lumbar laminectomy      H/O prior ablation treatment      H/O percutaneous left heart catheterization          Vital Signs Last 24 Hrs  T(C): 38 (21 Nov 2024 23:00), Max: 38.1 (21 Nov 2024 21:00)  T(F): 100.4 (21 Nov 2024 23:00), Max: 100.6 (21 Nov 2024 21:00)  HR: 96 (22 Nov 2024 02:15) (88 - 128)  BP: 92/53 (22 Nov 2024 02:15) (69/50 - 144/66)  BP(mean): 66 (22 Nov 2024 02:15) (56 - 646)  RR: 19 (22 Nov 2024 02:15) (17 - 31)  SpO2: 98% (22 Nov 2024 02:15) (93% - 100%)    Parameters below as of 21 Nov 2024 23:13  Patient On (Oxygen Delivery Method): ventilator        I&O's Summary    20 Nov 2024 07:01  -  21 Nov 2024 07:00  --------------------------------------------------------  IN: 2276.9 mL / OUT: 3075 mL / NET: -798.1 mL    21 Nov 2024 07:01  -  22 Nov 2024 03:01  --------------------------------------------------------  IN: 2188.4 mL / OUT: 2875 mL / NET: -686.6 mL                              9.7    13.93 )-----------( 146      ( 21 Nov 2024 05:48 )             30.2     11-21    133[L]  |  99  |  74[H]  ----------------------------<  194[H]  3.2[L]   |  19[L]  |  1.17    Ca    7.8[L]      21 Nov 2024 05:49  Phos  5.8     11-21  Mg     2.8     11-21    TPro  5.0[L]  /  Alb  2.9[L]  /  TBili  0.9  /  DBili  x   /  AST  22  /  ALT  77[H]  /  AlkPhos  114  11-21    Tacrolimus (), Serum: 1.0 ng/mL (11-21 @ 05:48)      Review of systems  All other systems were reviewed and are negative, except as noted.    PHYSICAL EXAM:  Constitutional: Well developed / well nourished  Eyes:  PERRLA  ENMT: nc/at, no thrush  Neck: supple   Respiratory: CTA B/L  Cardiovascular: RRR  Gastrointestinal: +Distended abdomen, appropriate incisional TTP. chevron incision c/d/i, staples in place. No signs of infection.  JPx3 ss  Genitourinary: Voiding spontaneously   Extremities: SCD's in place and working bilaterally  Neurological: A&O x0  Skin: no rashes, ulcerations, lesions  Psychiatric: Responsive Transplant Surgery - Multidisciplinary Progress Note   --------------------------------------------------------------  OLT   11/15/2024        POD#7    Present: Patient seen and examined with multidisciplinary Transplant team including (Surgery: Dr. Hutchinson. Hepatology: JAZZ Orourke,  SICU team in AM rounds. Disciplines not in attendance will be notified of the plan.     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    Interval Events:  - TMax 100.6F o/n, periods of AFib o/n requiring increased BB dosing and associated hypotension, requiring short course of levo, now off  - intubated, stable vent settings  - waking up, but on Precedex  - CT with ileus, minimal NGT output. having bowel function per RN  - CTC with concern for aspiration PNA  - good UO via nichols  - good graft function    Immunosuppression:  -FK per level, MMF 1/1, SST  -ongoing monitoring for signs of rejection    Potential Discharge date: Pending clinical course        MEDICATIONS  (STANDING):  albuterol/ipratropium for Nebulization 3 milliLiter(s) Nebulizer every 6 hours  aspirin Suppository 300 milliGRAM(s) Rectal daily  buDESOnide    Inhalation Suspension 0.5 milliGRAM(s) Inhalation two times a day  calcium carbonate 1250 mG  + Vitamin D (OsCal 500 + D) 1 Tablet(s) Oral two times a day  chlorhexidine 0.12% Liquid 15 milliLiter(s) Oral Mucosa every 12 hours  chlorhexidine 2% Cloths 1 Application(s) Topical <User Schedule>  dexMEDEtomidine Infusion 1.5 MICROgram(s)/kG/Hr (35.1 mL/Hr) IV Continuous <Continuous>  fluconAZOLE IVPB 400 milliGRAM(s) IV Intermittent every 24 hours  heparin   Injectable 5000 Unit(s) SubCutaneous every 12 hours  insulin lispro (ADMELOG) corrective regimen sliding scale   SubCutaneous every 6 hours  insulin NPH human recombinant 9 Unit(s) SubCutaneous every 6 hours  levETIRAcetam  IVPB 500 milliGRAM(s) IV Intermittent every 12 hours  LORazepam   Injectable 1 milliGRAM(s) IV Push every 8 hours  methylnaltrexone Injectable 12 milliGRAM(s) SubCutaneous once  metoprolol tartrate Injectable 5 milliGRAM(s) IV Push every 4 hours  metoprolol tartrate Injectable 5 milliGRAM(s) IV Push once  norepinephrine Infusion 0.04 MICROgram(s)/kG/Min (7.02 mL/Hr) IV Continuous <Continuous>  pantoprazole  Injectable 40 milliGRAM(s) IV Push daily  piperacillin/tazobactam IVPB.. 3.375 Gram(s) IV Intermittent every 8 hours  predniSONE   Tablet 20 milliGRAM(s) Oral daily  sodium chloride 0.9% 1000 milliLiter(s) (30 mL/Hr) IV Continuous <Continuous>  sodium chloride 3%  Inhalation 4 milliLiter(s) Inhalation every 6 hours  trimethoprim  40 mG/sulfamethoxazole 200 mG Suspension 80 milliGRAM(s) Enteral Tube daily  ursodiol Suspension 300 milliGRAM(s) Oral two times a day  valGANciclovir 50 mG/mL Oral Solution 450 milliGRAM(s) Oral daily    MEDICATIONS  (PRN):  HYDROmorphone  Injectable 1 milliGRAM(s) IV Push every 3 hours PRN Breakthrough Pain      PAST MEDICAL & SURGICAL HISTORY:  Diabetes      Transaminitis      Paroxysmal atrial fibrillation      Depression      BPH (benign prostatic hyperplasia)      Hypertension      Chronic atrial fibrillation      Coronary artery disease      Hepatocellular carcinoma      DM (diabetes mellitus)      HTN (hypertension)      Paroxysmal atrial fibrillation      Cirrhosis      HCC (hepatocellular carcinoma)      History of BPH      History of laparoscopic cholecystectomy      History of lumbar laminectomy      H/O prior ablation treatment      H/O percutaneous left heart catheterization          Vital Signs Last 24 Hrs  T(C): 37.5 (22 Nov 2024 07:00), Max: 38.1 (21 Nov 2024 21:00)  T(F): 99.5 (22 Nov 2024 07:00), Max: 100.6 (21 Nov 2024 21:00)  HR: 111 (22 Nov 2024 12:00) (86 - 132)  BP: 111/59 (22 Nov 2024 12:00) (69/50 - 150/65)  BP(mean): 78 (22 Nov 2024 12:00) (56 - 94)  RR: 22 (22 Nov 2024 12:00) (17 - 31)  SpO2: 97% (22 Nov 2024 12:00) (93% - 100%)    Parameters below as of 22 Nov 2024 05:02  Patient On (Oxygen Delivery Method): ventilator        I&O's Summary    21 Nov 2024 07:01  -  22 Nov 2024 07:00  --------------------------------------------------------  IN: 2765.9 mL / OUT: 3240 mL / NET: -474.1 mL    22 Nov 2024 07:01  -  22 Nov 2024 12:31  --------------------------------------------------------  IN: 520.8 mL / OUT: 1055 mL / NET: -534.2 mL                              10.2   13.66 )-----------( 198      ( 22 Nov 2024 05:38 )             32.4     11-22    138  |  105  |  62[H]  ----------------------------<  204[H]  4.1   |  20[L]  |  1.13    Ca    7.0[L]      22 Nov 2024 05:38  Phos  3.0     11-22  Mg     2.8     11-22    TPro  4.6[L]  /  Alb  2.9[L]  /  TBili  1.1  /  DBili  x   /  AST  21  /  ALT  55[H]  /  AlkPhos  100  11-22    Tacrolimus (), Serum: <0.8 ng/mL (11-22 @ 05:38)        Culture - Blood (collected 11-20-24 @ 11:53)  Source: .Blood BLOOD  Preliminary Report (11-21-24 @ 16:01):    No growth at 24 hours    Culture - Blood (collected 11-20-24 @ 11:43)  Source: .Blood BLOOD  Preliminary Report (11-21-24 @ 16:01):    No growth at 24 hours    Culture - Body Fluid with Gram Stain (collected 11-15-24 @ 20:13)  Source: Body Fluid  Gram Stain (11-16-24 @ 06:12):    No polymorphonuclear leukocytes seen    No organisms seen    by cytocentrifuge  Final Report (11-21-24 @ 08:50):    No growth at 5 days                  Review of systems  All other systems were reviewed and are negative, except as noted.    PHYSICAL EXAM:  Constitutional: Well developed / well nourished  Eyes:  PERRLA  ENMT: nc/at, no thrush  Neck: supple   Respiratory: CTA B/L, intubated  Cardiovascular: RRR  Gastrointestinal: +Distended abdomen, appropriate incisional TTP. chevron incision c/d/i, staples in place. No signs of infection.  JPx3 ss  Genitourinary: Voiding spontaneously   Extremities: SCD's in place and working bilaterally  Neurological: A&O x0, intubated, sedated  Skin: no rashes, ulcerations, lesions  Psychiatric: Responsive

## 2024-11-22 NOTE — PROGRESS NOTE ADULT - ATTENDING COMMENTS
I have seen and examined this patient on multidisciplinary SICU rounds this morning. I have reviewed all new labs, imaging and reports. 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 Post op course complicated by AF RVR, hypoxic respiratory failure.     24 hours events:   Weaned off levo   CT head negative  CT CAP bilateral lower lung consolidations concerning for aspiration, small R pleural effusion, colonic distention no obstruction   Neostigmine x2 for bassem, small mucousy bowel movement  T max 100.6    Neuro: precedex for sedation, following commands. Ativan taper for possible benzo withdrawal. keppra 500 mg BID.  vEEG no epileptiform abnormalities, now discontinued., will follow up with neurology. Holding home abilify and remeron. Transplant psych following  Resp: reintubated 11/20 on PRVC 14 540 8 40%, wean PEEP as tolerated   CV: AFib RVR intermittently up to 120s. IV metop 5 q4, holding nifedipine 60, off pressors.   GI: colonic dilation without obstruction. Neostigmine x2 without significant BM. stop movantik, start methylnaltrexone. Immunosuppression per hepatology; LFTs downtrending  Renal: Cr down to 1.13, lasix 40 IV. Castro. hyponatremia improved 138  Heme: Hb 10.2, SQH BID, rectal ASA while NPO  ID: fluc/bactrim/valcyte ppx, blood cx 11/20 NGTD, zosyn (11/20- ), trend procal q72 hours. WBC 13  Endo: NPH 9u q6 while NPO  Lines: OGT endoscopically placed, PIVs     The patient required critical care. Critical care time was indicated due to the patient's inherent instability and high risk for decompensation. E & M work was done by me in multiple 10-15 minute intervals over the course of the day in the ICU. I have coordinated care with multiple teams, and reviewed the medical record, consult notes, ICU flow sheets, cardiac monitoring, mechanical ventilation, medication record, brain and body imaging, and serial sequential labs.           Total time spent in the care of this patient today (excluding procedures): 40 minutes                    Over 50% of the total time was spent in discussion and coordination of care with consulting services, dietary and rehab services.      Whit Varela MD  Trauma and Critical Care

## 2024-11-22 NOTE — PROGRESS NOTE ADULT - ATTENDING COMMENTS
agree with dig load and esmolol  consider dilt as well  once we can give AC I would consider for cardioverison (Sotalol post)    D/w patient's family

## 2024-11-22 NOTE — PROGRESS NOTE ADULT - NS ATTEND AMEND GEN_ALL_CORE FT
Metformin, Metoprolol and Hydrochlorothiazide refilled per protocol. Next appointment due in September.ac   CT C/A/P -> unrevealing except for distended colon.   Rec'd Movantic + Neostigmine.  Remains distended, CXR w distended splenic flexure.  Vent support decreasing.   Following commands.  Afib --> on IV metoprolol.   Will need to discuss anticoagulation in future.  Mental status improving, on Ativan taper, CNI held.  ID - on empiric Zosyn.  Cx neg.  Immuno: CNI/cellcept held.  Pred 20mg. CT C/A/P -> unrevealing except for distended colon.   Rec'd Movantic + Neostigmine.  Remains distended, CXR w distended splenic flexure. Rectal tube today.  Vent support decreasing.   Following commands.  Diurese w lasix 40IV bid today.  Afib --> on IV metoprolol.   Will need to discuss anticoagulation in future.  Mental status improving, on Ativan taper, CNI held.  ID - on empiric Zosyn.  Cx neg.  Immuno: CNI/cellcept held.  Pred 20mg.

## 2024-11-22 NOTE — PROGRESS NOTE ADULT - SUBJECTIVE AND OBJECTIVE BOX
HPI:  Patient seen and examined at bedside in SICU.  Remains in atrial fibrillation with rapid ventricular response.  Intubated, but awake and alert.    Review Of Systems:           Respiratory: No shortness of breath, cough, or wheezing  Cardiovascular: No chest pain or palpitations  10 point review of systems is otherwise negative except as mentioned above        Medications:  albuterol/ipratropium for Nebulization 3 milliLiter(s) Nebulizer every 6 hours  aspirin Suppository 300 milliGRAM(s) Rectal daily  buDESOnide    Inhalation Suspension 0.5 milliGRAM(s) Inhalation two times a day  calcium carbonate 1250 mG  + Vitamin D (OsCal 500 + D) 1 Tablet(s) Oral two times a day  chlorhexidine 0.12% Liquid 15 milliLiter(s) Oral Mucosa every 12 hours  chlorhexidine 2% Cloths 1 Application(s) Topical <User Schedule>  dexMEDEtomidine Infusion 1.5 MICROgram(s)/kG/Hr IV Continuous <Continuous>  esmolol  Infusion 50 MICROgram(s)/kG/Min IV Continuous <Continuous>  fluconAZOLE IVPB 400 milliGRAM(s) IV Intermittent every 24 hours  heparin   Injectable 5000 Unit(s) SubCutaneous every 12 hours  HYDROmorphone  Injectable 1 milliGRAM(s) IV Push every 3 hours PRN  insulin lispro (ADMELOG) corrective regimen sliding scale   SubCutaneous every 6 hours  insulin NPH human recombinant 9 Unit(s) SubCutaneous every 6 hours  levETIRAcetam  IVPB 500 milliGRAM(s) IV Intermittent every 12 hours  LORazepam   Injectable 1 milliGRAM(s) IV Push every 8 hours  meropenem  IVPB 1000 milliGRAM(s) IV Intermittent every 8 hours  norepinephrine Infusion 0.04 MICROgram(s)/kG/Min IV Continuous <Continuous>  pantoprazole  Injectable 40 milliGRAM(s) IV Push daily  phenylephrine    Infusion 0.2 MICROgram(s)/kG/Min IV Continuous <Continuous>  predniSONE   Tablet 20 milliGRAM(s) Oral daily  sodium chloride 0.9% 1000 milliLiter(s) IV Continuous <Continuous>  sodium chloride 3%  Inhalation 4 milliLiter(s) Inhalation every 6 hours  trimethoprim  40 mG/sulfamethoxazole 200 mG Suspension 80 milliGRAM(s) Enteral Tube daily  ursodiol Suspension 300 milliGRAM(s) Oral two times a day  valGANciclovir 50 mG/mL Oral Solution 450 milliGRAM(s) Oral daily    PAST MEDICAL & SURGICAL HISTORY:  Diabetes      Transaminitis      Paroxysmal atrial fibrillation      Depression      BPH (benign prostatic hyperplasia)      Hypertension      Chronic atrial fibrillation      Coronary artery disease      Hepatocellular carcinoma      DM (diabetes mellitus)      HTN (hypertension)      Paroxysmal atrial fibrillation      Cirrhosis      HCC (hepatocellular carcinoma)      History of BPH      History of laparoscopic cholecystectomy      History of lumbar laminectomy      H/O prior ablation treatment      H/O percutaneous left heart catheterization        Vitals:  T(C): 38.4 (11-22-24 @ 23:00), Max: 38.4 (11-22-24 @ 20:45)  HR: 97 (11-22-24 @ 23:00) (86 - 137)  BP: 89/52 (11-22-24 @ 23:00) (72/50 - 150/65)  BP(mean): 64 (11-22-24 @ 23:00) (56 - 94)  RR: 19 (11-22-24 @ 23:00) (15 - 31)  SpO2: 98% (11-22-24 @ 23:00) (93% - 100%)  Wt(kg): --  Daily     Daily   I&O's Summary    21 Nov 2024 07:01  -  22 Nov 2024 07:00  --------------------------------------------------------  IN: 2765.9 mL / OUT: 3240 mL / NET: -474.1 mL    22 Nov 2024 07:01  -  22 Nov 2024 23:08  --------------------------------------------------------  IN: 1431.2 mL / OUT: 2910 mL / NET: -1478.8 mL        Physical Exam:  Appearance: Intubated, sedated  Eyes: PERRL, EOMI, pink conjunctiva  HENT: +ET tube  Cardiovascular: irregular S1, S2  Respiratory: Clear to auscultation bilaterally  Gastrointestinal: soft, non-tender, non-distended with normal bowel sounds  Musculoskeletal: No clubbing; no joint deformity                           10.2   13.66 )-----------( 198      ( 22 Nov 2024 05:38 )             32.4     11-22    138  |  105  |  62[H]  ----------------------------<  204[H]  4.1   |  20[L]  |  1.13    Ca    7.0[L]      22 Nov 2024 05:38  Phos  3.0     11-22  Mg     2.8     11-22    TPro  4.6[L]  /  Alb  2.9[L]  /  TBili  1.1  /  DBili  x   /  AST  21  /  ALT  55[H]  /  AlkPhos  100  11-22    PT/INR - ( 22 Nov 2024 05:38 )   PT: 14.8 sec;   INR: 1.29 ratio         PTT - ( 22 Nov 2024 05:38 )  PTT:23.6 sec      Cardiovascular Diagnostic Testing:  ECG: sinus rhythm    Echo: < from: Intra-Operative Transesophageal Echo W or WO Ultrasound Enhancing Agent (11.15.24 @ 07:46) >  --------------------------------------------------------------------------------  PRE-BYPASS FINDINGS     Left Ventricle:  Left ventricular ejection fraction is estimated at 60 to 65%. Normal left ventricularwall thickness. The left ventricular systolic function is normal There are no regional wall motion abnormalities seen.     Right Ventricle:  The right ventricular cavity is normal in size, with normal wall thickness and right ventricular systolic function is normal. Right ventricular systolic function is normal.     Left Atrium:  The left atrium is normal in size. No thrombus visualized in left atrial appendage.     Right Atrium:  The right atrium is normal in size. The right atrium is normal in size.     Interatrial Septum:  No PFO visualized with color flow doppler.     Aortic Valve:  The aortic valve appears trileaflet. There is no aortic valve stenosis. There is trace aortic regurgitation.     Mitral Valve:  There is no mitral valve stenosis. There is no mitral valve stenosis. There is trace mitral regurgitation.     Tricuspid Valve:  There is no evidence of tricuspid stenosis. There is trace tricuspid regurgitation.     Pericardium:  No pericardial effusion seen.     Post-Bypass:  S/P OLT. Under current loading conditions, hyperdynamic left ventricular systolic function, EF: 70-75% by visual estimate, no RWMA. Normal right ventricular systolic function. All other findings unchanged. Probe removed atraumatically, no blood. The patient tolerated the procedure well and without complications. Permanent recorded images are stored in the medical record.     Electronically signed by James Villareal on 11/15/2024 at 2:18:16 PM         *** Final ***    < end of copied text >      Stress Testing:     Cath: 4/24/2024, patient had his LHC repeated with Ben Villareal MD at [facility].  Cath showed multivessel coronary artery disease, but the lesions previously noted were FFR negative.  He continues to have MIRTA 3 flow throughout.    Interpretation of Telemetry: AFib with RVR    Imaging:

## 2024-11-22 NOTE — PROGRESS NOTE ADULT - ASSESSMENT
74 year-old with known coronary artery disease as above presents for liver transplant.  Seen to have chest pain post transplant.  It was atypical of angina and has resolved.  Troponin remained negative.    Now with atrial fibrillation with RVR (11/19) - rate control with beta-blocker as needed  Head CT to evaluate for bleeding in setting of acute mental status change    Rate control with betablocker (esmolol gtt) and Digoxin  Anticoagulation when cleared by surgery  EP consult appreciated

## 2024-11-22 NOTE — CONSULT NOTE ADULT - ASSESSMENT
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 Pt underwent Y90 Sept, 2023 with favorable treatment response.Pt completed OLTx evaluation and listed for OLTx 5/03/2024     # liver transplant  # immunosuppression  # possible aspiration pneumonia  # distended abdomen    Would:  Send two additional blood cultures  Send deep suction sputum from the ET tube to see what organisms he is colonized with  Give a dose of Vancomycin 1gram IV q 12hr  IF sputum without MRSA can discontinue the Vancomycin  Change the Zosyn to Meropenem 1 gram q 8hr pending the repeat cultures  Try to decompress the stomach?NG to suction continue the protonix      Discussed with SICU team    Wes Carrero MD  Can be called via Teams  After 5pm/weekends [phone]

## 2024-11-22 NOTE — PROGRESS NOTE ADULT - ASSESSMENT
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 Post-op course c/b worsening mental status and re-intubation 11/20 for acute hypoxemic respiratory failure 2/2 aspiration.     PLAN:  Neuro:  - Sedation with precedex gtt  - pain control w/ Dilaudid   - takes xanax 2mg TID at home  - Benzo taper: Ativan 1mg TID x3d --> 1mg BID x3d  - CT head 11/19 and 11/21 negative  - Keppra restarted: 500mg BID   - Holding home xanax, Abilify, Zoloft, Remeron, Lexapro   - Ammonia 23     Resp:  - Intubated 11/20 2/2 acute hypoxemic respiratory failure 2/2 aspiration   - PRVC 14/540/8/50  - Home budesonide & duonebs    CV:  - now on levo gtt  - goal MAP > 65 mmHg  - metoprolol 5 IV q4    GI:   - Increasing abdominal distension   - NPO w/ OGT to suction   - Abd XR 11/20: Ileus   - Bowel regimen Movantix qd x 3 days, holding miralax/senna  - Protonix for stress ulcer prophylaxis  - ALYSSA x2, monitor output  - post-op liver doppler w/ patent vasculature   - F/u CT chest, A/P 11/21  - S/p Neostigmine 2mg x1 11/21    Renal:  - Monitor I&Os and electrolytes w/ repletions as necessary  - NS @30 for MORAIMA   - Hold diuresis   - Monitor I/Os    Heme:  - Monitor CBC and coags  - SQH BID for VTE prophylaxis  - ASA per rectum     ID:   - Monitor for clinical evidence of active infection  - Monitor WBC, temperature, and procalcitonin  - Transplant ppx w/ bactrim, valcyte, fluconazole  - immunosuppression w/ steroids, tacro and Cellcept   - Blood cx 11/20 NGTD  - Zosyn (11/20 -   - Procal 0.23, trend q48hr    Endo:   - Monitor glucose   - lantus 18u  - moderate ISS  - post-transplant steroid taper     Lines:   - PIVs  - nichols  - NGT  - JPs x2

## 2024-11-22 NOTE — PROGRESS NOTE ADULT - ASSESSMENT
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  Stable perihepatic fluid collection measuring 4.5 x 1.9 x 3.4 cm.   -diet NPO  -PT/OT/SCDs/spirometry  -bowel regimen as able  -ASA/SQH  -leukocytosis and fever curve, likely reactive-- improving: cultures pending- on Zosyn  -abdominal xray 11/20: large gastric bubble  -Lasix 40 IV x 1  -f/u CT abd and pelvis   -f/u CT head,   -Reglan 10mg q8 after CT     []AMS   -reintubated: On propofol GTT for sedation   -Ativan taper 1mg BID x 3 days, per family was on higher dose Xanax PO   -EEG 11/20: Mild diffuse cerebral dysfunction is nonspecific in etiology. No epileptiform abnormalities or seizures.  -Home psych medications restarted, Transplant Psych following; agitated, dc remeron/abilify, start seroquel    Immunosuppression  -FK per level (HELD), MMF HELD, SST  -SIMULECT #2 on POD#4  -PPx: Valcyte/Bactrim/PPI/OsCal/Fluc    HTN/ pAFib  -Metroprolol 5 mg IVP q6 hr, Nifedipine, adjust prn.   - Dr. 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  Stable perihepatic fluid collection measuring 4.5 x 1.9 x 3.4 cm.   -diet NPO  -PT/OT/SCDs/spirometry  -bowel regimen as able  -ASA/SQH  -leukocytosis and fever curve, cultures negative, but concern for aspiration PNA and possible ileus on imaging 11/21. Transplant ID consulted. on Zosyn for now  -Lasix 40 IV x 2 as able throughout the day  -Reglan as needed    [] AMS   -reintubated, vent per SICU  -Ativan taper, per family was on higher dose Xanax PO, Transplant Psych following  -EEG 11/20: Mild diffuse cerebral dysfunction is nonspecific in etiology. No epileptiform abnormalities or seizures.  -home psych meds as able    Immunosuppression  -FK HELD, expect Cyclo start this weekend, MMF HELD, SST  -SIMULECT completed  -PPx: Valcyte/Bactrim/PPI/OsCal/Fluc    HTN/ pAFib  - BB as able  - Dr. Quintanilla aware, on diltiazem gtt d/c'ed    DM  -Lantus/Lispro, adjust prn  -Endocrine as needed given steroids

## 2024-11-22 NOTE — PROGRESS NOTE ADULT - SUBJECTIVE AND OBJECTIVE BOX
INTERVAL EVENTS: No o/n events. Denies CP, dyspnea, palpitations, presyncope, syncope, f/c/n/v.     REVIEW OF SYSTEMS:  Constitutional:     [X] negative [ ] fevers [ ] chills [ ] weight loss [ ] weight gain  HEENT:                  [X] negative [ ] dry eyes [ ] eye irritation [ ] postnasal drip [ ] nasal congestion  CV:                         [X] negative  [ ] chest pain [ ] orthopnea [ ] palpitations [ ] murmur  Resp:                     [X] negative [ ] cough [ ] shortness of breath [ ] wheezing [ ] sputum [ ] hemoptysis  GI:                          [X] negative [ ] nausea [ ] vomiting [ ] diarrhea [ ] constipation [ ] abd pain [ ] dysphagia   :                        [X] negative [ ] dysuria [ ] nocturia [ ] hematuria [ ] increased urinary frequency  MSK:                      [X] negative [ ] back pain [ ] myalgias [ ] arthralgias [ ] fracture  Skin:                       [X] negative [ ] rash [ ] itch  Neuro:                   [X] negative [ ] headache [ ] dizziness [ ] syncope [ ] weakness [ ] numbness  Psych:                    [X] negative [ ] anxiety [ ] depression  Endo:                     [X] negative [ ] diabetes [ ] thyroid problem  Heme/Lymph:      [X] negative [ ] anemia [ ] bleeding problem  Allergic/Immune: [X] negative [ ] itchy eyes [ ] nasal discharge [ ] hives [ ] angioedema    [X] All other systems negative or otherwise described above.  [ ] Unable to assess ROS due to ________.    PAST MEDICAL & SURGICAL HISTORY:  Diabetes    Transaminitis    Paroxysmal atrial fibrillation    Depression    BPH (benign prostatic hyperplasia)    Hypertension    Hyperlipidemia    Chronic atrial fibrillation    BPH (benign prostatic hyperplasia)    Coronary artery disease    Hepatocellular carcinoma    DM (diabetes mellitus)    HTN (hypertension)    Paroxysmal atrial fibrillation    Cirrhosis    HCC (hepatocellular carcinoma)    History of BPH    History of laparoscopic cholecystectomy    History of lumbar laminectomy    H/O prior ablation treatment    H/O percutaneous left heart catheterization      MEDICATIONS  (STANDING):  albuterol/ipratropium for Nebulization 3 milliLiter(s) Nebulizer every 6 hours  aspirin Suppository 300 milliGRAM(s) Rectal daily  buDESOnide    Inhalation Suspension 0.5 milliGRAM(s) Inhalation two times a day  calcium carbonate 1250 mG  + Vitamin D (OsCal 500 + D) 1 Tablet(s) Oral two times a day  chlorhexidine 0.12% Liquid 15 milliLiter(s) Oral Mucosa every 12 hours  chlorhexidine 2% Cloths 1 Application(s) Topical <User Schedule>  dexMEDEtomidine Infusion 1.5 MICROgram(s)/kG/Hr (35.1 mL/Hr) IV Continuous <Continuous>  digoxin  Injectable 500 MICROGram(s) IV Push once  esmolol  Infusion 50 MICROgram(s)/kG/Min (28.1 mL/Hr) IV Continuous <Continuous>  fluconAZOLE IVPB 400 milliGRAM(s) IV Intermittent every 24 hours  heparin   Injectable 5000 Unit(s) SubCutaneous every 12 hours  insulin lispro (ADMELOG) corrective regimen sliding scale   SubCutaneous every 6 hours  insulin NPH human recombinant 9 Unit(s) SubCutaneous every 6 hours  levETIRAcetam  IVPB 500 milliGRAM(s) IV Intermittent every 12 hours  LORazepam   Injectable 1 milliGRAM(s) IV Push every 8 hours  norepinephrine Infusion 0.04 MICROgram(s)/kG/Min (7.02 mL/Hr) IV Continuous <Continuous>  pantoprazole  Injectable 40 milliGRAM(s) IV Push daily  predniSONE   Tablet 20 milliGRAM(s) Oral daily  sodium chloride 0.9% 1000 milliLiter(s) (30 mL/Hr) IV Continuous <Continuous>  sodium chloride 3%  Inhalation 4 milliLiter(s) Inhalation every 6 hours  trimethoprim  40 mG/sulfamethoxazole 200 mG Suspension 80 milliGRAM(s) Enteral Tube daily  ursodiol Suspension 300 milliGRAM(s) Oral two times a day  valGANciclovir 50 mG/mL Oral Solution 450 milliGRAM(s) Oral daily    MEDICATIONS  (PRN):  HYDROmorphone  Injectable 1 milliGRAM(s) IV Push every 3 hours PRN Breakthrough Pain    ICU Vital Signs Last 24 Hrs  T(C): 38 (22 Nov 2024 15:00), Max: 38.1 (21 Nov 2024 21:00)  T(F): 100.4 (22 Nov 2024 15:00), Max: 100.6 (21 Nov 2024 21:00)  HR: 128 (22 Nov 2024 18:00) (86 - 137)  BP: 114/56 (22 Nov 2024 18:00) (69/50 - 150/65)  BP(mean): 80 (22 Nov 2024 18:00) (56 - 94)  ABP: --  ABP(mean): --  RR: 21 (22 Nov 2024 18:00) (17 - 31)  SpO2: 98% (22 Nov 2024 18:00) (93% - 100%)    O2 Parameters below as of 22 Nov 2024 17:19  Patient On (Oxygen Delivery Method): ventilator          Orthostatic VS    Daily     Daily   I&O's Summary    21 Nov 2024 07:01  -  22 Nov 2024 07:00  --------------------------------------------------------  IN: 2765.9 mL / OUT: 3240 mL / NET: -474.1 mL    22 Nov 2024 07:01  -  22 Nov 2024 18:56  --------------------------------------------------------  IN: 1168.9 mL / OUT: 2160 mL / NET: -991.1 mL        PHYSICAL EXAM:  GENERAL: No acute distress, well-developed  ENT: EOMI, conjunctiva and sclera clear, Neck supple, No JVD, moist mucosa  CHEST/LUNG: Clear to auscultation bilaterally; No wheeze, equal breath sounds bilaterally  HEART: Regular rate and rhythm; No murmurs, rubs, or gallops, radial and DP 2+ b/l, euvolemic  ABDOMEN: Soft, Nontender, Nondistended  EXTREMITIES:  No clubbing, cyanosis, or edema  NEUROLOGY: AAOx3, non-focal  SKIN: Normal color, No rashes or lesions    LABS:                        10.2   13.66 )-----------( 198      ( 22 Nov 2024 05:38 )             32.4     PT/INR - ( 22 Nov 2024 05:38 )   PT: 14.8 sec;   INR: 1.29 ratio         PTT - ( 22 Nov 2024 05:38 )  PTT:23.6 sec  11-22    138  |  105  |  62[H]  ----------------------------<  204[H]  4.1   |  20[L]  |  1.13    Ca    7.0[L]      22 Nov 2024 05:38  Phos  3.0     11-22  Mg     2.8     11-22    TPro  4.6[L]  /  Alb  2.9[L]  /  TBili  1.1  /  DBili  x   /  AST  21  /  ALT  55[H]  /  AlkPhos  100  11-22        proBNP:   Lipid Profile:   HgA1c:   TSH:  Urinalysis Basic - ( 22 Nov 2024 05:38 )    Color: x / Appearance: x / SG: x / pH: x  Gluc: 204 mg/dL / Ketone: x  / Bili: x / Urobili: x   Blood: x / Protein: x / Nitrite: x   Leuk Esterase: x / RBC: x / WBC x   Sq Epi: x / Non Sq Epi: x / Bacteria: x        Culture - Blood (collected 20 Nov 2024 11:53)  Source: .Blood BLOOD  Preliminary Report (22 Nov 2024 16:15):    No growth at 48 Hours    Culture - Blood (collected 20 Nov 2024 11:43)  Source: .Blood BLOOD  Preliminary Report (22 Nov 2024 16:15):    No growth at 48 Hours        RADIOLOGY & ADDITIONAL STUDIES:    Telemetry: reviewed; rapid afib    Echo: Personally reviewed  < from: Intra-Operative Transesophageal Echo W or WO Ultrasound Enhancing Agent (11.15.24 @ 07:46) >  PRE-BYPASS FINDINGS     Left Ventricle:  Left ventricular ejection fraction is estimated at 60 to 65%. Normal left ventricularwall thickness. The left ventricular systolic function is normal There are no regional wall motion abnormalities seen.     Right Ventricle:  The right ventricular cavity is normal in size, with normal wall thickness and right ventricular systolic function is normal. Right ventricular systolic function is normal.     Left Atrium:  The left atrium is normal in size. No thrombus visualized in left atrial appendage.     Right Atrium:  The right atrium is normal in size. The right atrium is normal in size.     Interatrial Septum:  No PFO visualized with color flow doppler.     Aortic Valve:  The aortic valve appears trileaflet. There is no aortic valve stenosis. There is trace aortic regurgitation.     Mitral Valve:  There is no mitral valve stenosis. There is no mitral valve stenosis. There is trace mitral regurgitation.     Tricuspid Valve:  There is no evidence of tricuspid stenosis. There is trace tricuspid regurgitation.     Pericardium:  No pericardial effusion seen.     Post-Bypass:  S/P OLT. Under current loading conditions, hyperdynamic left ventricular systolic function, EF: 70-75% by visual estimate, no RWMA. Normal right ventricular systolic function. All other findings unchanged. Probe removed atraumatically, no blood. The patient tolerated the procedure well and without complications. Permanent recorded images are stored in the medical record.    < end of copied text >    Cath:   < from: Cardiac Catheterization (04.24.24 @ 09:34) >  Diagnostic Summary:     - Non-obstructive CAD - Mid RCA 50% FFR negative 0.85, Distal LCx 50-60% FFR negative 0.86    - Remainder of vessels with mild luminal irregularities, co-dominant coronary circulation    - LVEDP 19, no LV-Ao gradient on pullback     - Cathworks FFR with 3D functional mapping of color-coded FFR utilized for analysis    < end of copied text >      CXR: Personally reviewed    Other cardiac imaging: none    Other misc imaging: none       INTERVAL EVENTS: Febrile in AM. Rapid rates in atrial fibrillation.    REVIEW OF SYSTEMS:  Constitutional:     [ ] negative [ ] fevers [ ] chills [ ] weight loss [ ] weight gain  HEENT:                  [ ] negative [ ] dry eyes [ ] eye irritation [ ] postnasal drip [ ] nasal congestion  CV:                         [ ] negative  [ ] chest pain [ ] orthopnea [ ] palpitations [ ] murmur  Resp:                     [ ] negative [ ] cough [ ] shortness of breath [ ] wheezing [ ] sputum [ ] hemoptysis  GI:                          [ ] negative [ ] nausea [ ] vomiting [ ] diarrhea [ ] constipation [ ] abd pain [ ] dysphagia   :                        [ ] negative [ ] dysuria [ ] nocturia [ ] hematuria [ ] increased urinary frequency  MSK:                      [ ] negative [ ] back pain [ ] myalgias [ ] arthralgias [ ] fracture  Skin:                       [ ] negative [ ] rash [ ] itch  Neuro:                   [ ] negative [ ] headache [ ] dizziness [ ] syncope [ ] weakness [ ] numbness  Psych:                    [ ] negative [ ] anxiety [ ] depression  Endo:                     [ ] negative [ ] diabetes [ ] thyroid problem  Heme/Lymph:      [ ] negative [ ] anemia [ ] bleeding problem  Allergic/Immune: [ ] negative [ ] itchy eyes [ ] nasal discharge [ ] hives [ ] angioedema    [ ] All other systems negative or otherwise described above.  [X] Unable to assess ROS due to intubation/sedation    PAST MEDICAL & SURGICAL HISTORY:  Diabetes    Transaminitis    Paroxysmal atrial fibrillation    Depression    BPH (benign prostatic hyperplasia)    Hypertension    Hyperlipidemia    Chronic atrial fibrillation    BPH (benign prostatic hyperplasia)    Coronary artery disease    Hepatocellular carcinoma    DM (diabetes mellitus)    HTN (hypertension)    Paroxysmal atrial fibrillation    Cirrhosis    HCC (hepatocellular carcinoma)    History of BPH    History of laparoscopic cholecystectomy    History of lumbar laminectomy    H/O prior ablation treatment    H/O percutaneous left heart catheterization      MEDICATIONS  (STANDING):  albuterol/ipratropium for Nebulization 3 milliLiter(s) Nebulizer every 6 hours  aspirin Suppository 300 milliGRAM(s) Rectal daily  buDESOnide    Inhalation Suspension 0.5 milliGRAM(s) Inhalation two times a day  calcium carbonate 1250 mG  + Vitamin D (OsCal 500 + D) 1 Tablet(s) Oral two times a day  chlorhexidine 0.12% Liquid 15 milliLiter(s) Oral Mucosa every 12 hours  chlorhexidine 2% Cloths 1 Application(s) Topical <User Schedule>  dexMEDEtomidine Infusion 1.5 MICROgram(s)/kG/Hr (35.1 mL/Hr) IV Continuous <Continuous>  digoxin  Injectable 500 MICROGram(s) IV Push once  esmolol  Infusion 50 MICROgram(s)/kG/Min (28.1 mL/Hr) IV Continuous <Continuous>  fluconAZOLE IVPB 400 milliGRAM(s) IV Intermittent every 24 hours  heparin   Injectable 5000 Unit(s) SubCutaneous every 12 hours  insulin lispro (ADMELOG) corrective regimen sliding scale   SubCutaneous every 6 hours  insulin NPH human recombinant 9 Unit(s) SubCutaneous every 6 hours  levETIRAcetam  IVPB 500 milliGRAM(s) IV Intermittent every 12 hours  LORazepam   Injectable 1 milliGRAM(s) IV Push every 8 hours  norepinephrine Infusion 0.04 MICROgram(s)/kG/Min (7.02 mL/Hr) IV Continuous <Continuous>  pantoprazole  Injectable 40 milliGRAM(s) IV Push daily  predniSONE   Tablet 20 milliGRAM(s) Oral daily  sodium chloride 0.9% 1000 milliLiter(s) (30 mL/Hr) IV Continuous <Continuous>  sodium chloride 3%  Inhalation 4 milliLiter(s) Inhalation every 6 hours  trimethoprim  40 mG/sulfamethoxazole 200 mG Suspension 80 milliGRAM(s) Enteral Tube daily  ursodiol Suspension 300 milliGRAM(s) Oral two times a day  valGANciclovir 50 mG/mL Oral Solution 450 milliGRAM(s) Oral daily    MEDICATIONS  (PRN):  HYDROmorphone  Injectable 1 milliGRAM(s) IV Push every 3 hours PRN Breakthrough Pain    ICU Vital Signs Last 24 Hrs  T(C): 38 (22 Nov 2024 15:00), Max: 38.1 (21 Nov 2024 21:00)  T(F): 100.4 (22 Nov 2024 15:00), Max: 100.6 (21 Nov 2024 21:00)  HR: 128 (22 Nov 2024 18:00) (86 - 137)  BP: 114/56 (22 Nov 2024 18:00) (69/50 - 150/65)  BP(mean): 80 (22 Nov 2024 18:00) (56 - 94)  ABP: --  ABP(mean): --  RR: 21 (22 Nov 2024 18:00) (17 - 31)  SpO2: 98% (22 Nov 2024 18:00) (93% - 100%)    O2 Parameters below as of 22 Nov 2024 17:19  Patient On (Oxygen Delivery Method): ventilator          Orthostatic VS    Daily     Daily   I&O's Summary    21 Nov 2024 07:01  -  22 Nov 2024 07:00  --------------------------------------------------------  IN: 2765.9 mL / OUT: 3240 mL / NET: -474.1 mL    22 Nov 2024 07:01  -  22 Nov 2024 18:56  --------------------------------------------------------  IN: 1168.9 mL / OUT: 2160 mL / NET: -991.1 mL        PHYSICAL EXAM:  GENERAL: intubated/sedated  ENT: Neck supple, No JVD, moist mucosa  CHEST/LUNG: mechanical breath sounds  HEART: irregular, tachycardia   ABDOMEN: distended  EXTREMITIES: No clubbing, cyanosis  NEUROLOGY: sedated  SKIN: no rashes or lesions    LABS:                        10.2   13.66 )-----------( 198      ( 22 Nov 2024 05:38 )             32.4     PT/INR - ( 22 Nov 2024 05:38 )   PT: 14.8 sec;   INR: 1.29 ratio         PTT - ( 22 Nov 2024 05:38 )  PTT:23.6 sec  11-22    138  |  105  |  62[H]  ----------------------------<  204[H]  4.1   |  20[L]  |  1.13    Ca    7.0[L]      22 Nov 2024 05:38  Phos  3.0     11-22  Mg     2.8     11-22    TPro  4.6[L]  /  Alb  2.9[L]  /  TBili  1.1  /  DBili  x   /  AST  21  /  ALT  55[H]  /  AlkPhos  100  11-22      Urinalysis Basic - ( 22 Nov 2024 05:38 )    Color: x / Appearance: x / SG: x / pH: x  Gluc: 204 mg/dL / Ketone: x  / Bili: x / Urobili: x   Blood: x / Protein: x / Nitrite: x   Leuk Esterase: x / RBC: x / WBC x   Sq Epi: x / Non Sq Epi: x / Bacteria: x        Culture - Blood (collected 20 Nov 2024 11:53)  Source: .Blood BLOOD  Preliminary Report (22 Nov 2024 16:15):    No growth at 48 Hours    Culture - Blood (collected 20 Nov 2024 11:43)  Source: .Blood BLOOD  Preliminary Report (22 Nov 2024 16:15):    No growth at 48 Hours        RADIOLOGY & ADDITIONAL STUDIES:    Telemetry: reviewed; rapid afib, occasional PVCs    Echo: Personally reviewed  < from: Intra-Operative Transesophageal Echo W or WO Ultrasound Enhancing Agent (11.15.24 @ 07:46) >  PRE-BYPASS FINDINGS     Left Ventricle:  Left ventricular ejection fraction is estimated at 60 to 65%. Normal left ventricularwall thickness. The left ventricular systolic function is normal There are no regional wall motion abnormalities seen.     Right Ventricle:  The right ventricular cavity is normal in size, with normal wall thickness and right ventricular systolic function is normal. Right ventricular systolic function is normal.     Left Atrium:  The left atrium is normal in size. No thrombus visualized in left atrial appendage.     Right Atrium:  The right atrium is normal in size. The right atrium is normal in size.     Interatrial Septum:  No PFO visualized with color flow doppler.     Aortic Valve:  The aortic valve appears trileaflet. There is no aortic valve stenosis. There is trace aortic regurgitation.     Mitral Valve:  There is no mitral valve stenosis. There is no mitral valve stenosis. There is trace mitral regurgitation.     Tricuspid Valve:  There is no evidence of tricuspid stenosis. There is trace tricuspid regurgitation.     Pericardium:  No pericardial effusion seen.     Post-Bypass:  S/P OLT. Under current loading conditions, hyperdynamic left ventricular systolic function, EF: 70-75% by visual estimate, no RWMA. Normal right ventricular systolic function. All other findings unchanged. Probe removed atraumatically, no blood. The patient tolerated the procedure well and without complications. Permanent recorded images are stored in the medical record.    < end of copied text >    Cath:   < from: Cardiac Catheterization (04.24.24 @ 09:34) >  Diagnostic Summary:     - Non-obstructive CAD - Mid RCA 50% FFR negative 0.85, Distal LCx 50-60% FFR negative 0.86    - Remainder of vessels with mild luminal irregularities, co-dominant coronary circulation    - LVEDP 19, no LV-Ao gradient on pullback     - Cathworks FFR with 3D functional mapping of color-coded FFR utilized for analysis    < end of copied text >      CXR: Personally reviewed  < from: Xray Chest 1 View- PORTABLE-Routine (Xray Chest 1 View- PORTABLE-Routine in AM.) (11.22.24 @ 07:46) >  IMPRESSION:  Endotracheal tube and enteric tube in good position as above.  Persistent bibasilar opacities likely atelectasis and trace effusions,   right greater than left, similar prior study.    --- End of Report ---    < end of copied text >

## 2024-11-22 NOTE — PROGRESS NOTE ADULT - SUBJECTIVE AND OBJECTIVE BOX
24 HOUR EVENTS:  - Febrile to 38C, hypotensive to SBP 70's, now on levo gtt  - CT head unremarkable   - f/u CT chest and A/P   - ETT advanced 3cm  - Abdomen increasingly distended -> neostigmine 2mg given    NEURO  RASS (if intubated): 		CAM ICU (if concern for delirium):  Exam:   Meds: aspirin Suppository 300 milliGRAM(s) Rectal daily  dexMEDEtomidine Infusion 1.5 MICROgram(s)/kG/Hr IV Continuous <Continuous>  HYDROmorphone  Injectable 1 milliGRAM(s) IV Push every 3 hours PRN Breakthrough Pain  levETIRAcetam  IVPB 500 milliGRAM(s) IV Intermittent every 12 hours  LORazepam   Injectable 1 milliGRAM(s) IV Push every 8 hours      RESPIRATORY  RR: 19 (11-22-24 @ 00:00) (17 - 28)  SpO2: 100% (11-22-24 @ 00:00) (93% - 100%)  Wt(kg): --  Exam: Lungs CTA b/l  Mechanical Ventilation: Mode: AC/ CMV (Assist Control/ Continuous Mandatory Ventilation), RR (machine): 14, RR (patient): 21, TV (machine): 540, FiO2: 50, PEEP: 8, ITime: 1, MAP: 12, PIP: 22  ABG - ( 21 Nov 2024 05:36 )  pH: 7.48  /  pCO2: 30    /  pO2: 91    / HCO3: 22    / Base Excess: -0.6  /  SaO2: 98.6    Lactate: x                Meds: albuterol/ipratropium for Nebulization 3 milliLiter(s) Nebulizer every 6 hours  buDESOnide    Inhalation Suspension 0.5 milliGRAM(s) Inhalation two times a day  sodium chloride 3%  Inhalation 4 milliLiter(s) Inhalation every 6 hours      CARDIOVASCULAR  HR: 99 (11-22-24 @ 00:00) (94 - 128)  BP: 99/61 (11-22-24 @ 00:00) (69/50 - 128/69)  BP(mean): 74 (11-22-24 @ 00:00) (56 - 646)  ABP: --  ABP(mean): --  Wt(kg): --  CVP(cm H2O): --      Exam: Normal S1/S2 w/o murmurs or rubs  Cardiac Rhythm:   Perfusion     [ ]Adequate   [ ]Inadequate  Mentation   [ ]Normal       [ ]Reduced  Extremities  [ ]Warm         [ ]Cool  Volume Status [ ]Hypervolemic [ ]Euvolemic [ ]Hypovolemic  Meds: metoprolol tartrate Injectable 5 milliGRAM(s) IV Push every 4 hours  norepinephrine Infusion 0.04 MICROgram(s)/kG/Min IV Continuous <Continuous>      GI/NUTRITION  Exam:   Diet:   Last Bowel Movement: 19-Nov-2024 (11-20-24 @ 19:00)  Last Bowel Movement: 20-Nov-2024 (11-20-24 @ 07:00)  Last Bowel Movement: 20-Nov-2024 (11-19-24 @ 23:00)  Last Bowel Movement: 14-Nov-2024 (11-14-24 @ 20:30)    Meds: naloxegol 12.5 milliGRAM(s) Oral <User Schedule>  pantoprazole  Injectable 40 milliGRAM(s) IV Push daily  ursodiol Suspension 300 milliGRAM(s) Oral two times a day      GENITOURINARY  I&O's Detail    11-20 @ 07:01  -  11-21 @ 07:00  --------------------------------------------------------  IN:    Albumin 5%  - 250 mL: 250 mL    Diltiazem: 10 mL    Enteral Tube Flush: 530 mL    IV PiggyBack: 325 mL    IV PiggyBack: 300 mL    Propofol: 291.9 mL    sodium chloride 0.9%: 570 mL  Total IN: 2276.9 mL    OUT:    Bulb (mL): 480 mL    Bulb (mL): 390 mL    Dexmedetomidine: 0 mL    Indwelling Catheter - Urethral (mL): 1805 mL    Nasogastric/Oral tube (mL): 0 mL    Norepinephrine: 0 mL    Voided (mL): 400 mL  Total OUT: 3075 mL    Total NET: -798.1 mL      11-21 @ 07:01  -  11-22 @ 00:44  --------------------------------------------------------  IN:    Dexmedetomidine: 102.9 mL    Enteral Tube Flush: 260 mL    IV PiggyBack: 675 mL    IV PiggyBack: 150 mL    Norepinephrine: 8.8 mL    Norepinephrine: 7 mL    Propofol: 112 mL    sodium chloride 0.9%: 300 mL    sodium chloride 0.9% w/ Additives: 390 mL  Total IN: 2005.7 mL    OUT:    Bulb (mL): 130 mL    Bulb (mL): 150 mL    Indwelling Catheter - Urethral (mL): 2035 mL    Nasogastric/Oral tube (mL): 25 mL  Total OUT: 2340 mL    Total NET: -334.3 mL          11-21    133[L]  |  99  |  74[H]  ----------------------------<  194[H]  3.2[L]   |  19[L]  |  1.17    Ca    7.8[L]      21 Nov 2024 05:49  Phos  5.8     11-21  Mg     2.8     11-21    TPro  5.0[L]  /  Alb  2.9[L]  /  TBili  0.9  /  DBili  x   /  AST  22  /  ALT  77[H]  /  AlkPhos  114  11-21    Meds: calcium carbonate 1250 mG  + Vitamin D (OsCal 500 + D) 1 Tablet(s) Oral two times a day  sodium chloride 0.9% 1000 milliLiter(s) IV Continuous <Continuous>      HEMATOLOGIC  Meds: heparin   Injectable 5000 Unit(s) SubCutaneous every 12 hours                          9.7    13.93 )-----------( 146      ( 21 Nov 2024 05:48 )             30.2     PT/INR - ( 21 Nov 2024 05:50 )   PT: 15.9 sec;   INR: 1.40 ratio         PTT - ( 21 Nov 2024 05:50 )  PTT:23.3 sec    INFECTIOUS DISEASES  T(C): 38 (11-21-24 @ 23:00), Max: 38.1 (11-21-24 @ 21:00)  Wt(kg): --  WBC Count: 13.93 K/uL (11-21 @ 05:48)    Recent Cultures:  Specimen Source: .Blood BLOOD, 11-20 @ 11:53; Results   No growth at 24 hours; Gram Stain: --; Organism: --  Specimen Source: .Blood BLOOD, 11-20 @ 11:43; Results   No growth at 24 hours; Gram Stain: --; Organism: --  Specimen Source: Body Fluid, 11-15 @ 20:13; Results   No growth at 5 days; Gram Stain:   No polymorphonuclear leukocytes seen  No organisms seen  by cytocentrifuge; Organism: --    Meds: fluconAZOLE IVPB 400 milliGRAM(s) IV Intermittent every 24 hours  piperacillin/tazobactam IVPB.. 3.375 Gram(s) IV Intermittent every 8 hours  trimethoprim   80 mG/sulfamethoxazole 400 mG 1 Tablet(s) Oral daily  valGANciclovir 450 milliGRAM(s) Oral daily      ENDOCRINE  Capillary Blood Glucose    Meds: insulin glargine Injectable (LANTUS) 18 Unit(s) SubCutaneous at bedtime  insulin lispro (ADMELOG) corrective regimen sliding scale   SubCutaneous every 6 hours  predniSONE   Tablet 20 milliGRAM(s) Oral daily      ACCESS DEVICES:  [ ] Peripheral IV  [ ] Central Venous Line		[ ] R	[ ] L	[ ] IJ	[ ] Fem	[ ] SC	Placed:   [ ] Arterial Line			[ ] R	[ ] L	[ ] Fem	[ ] Rad	[ ] Ax	Placed:   [ ] PICC:					[ ] Mediport  [ ] Urinary Catheter, Date Placed:   [ ] Necessity of urinary, arterial, and venous catheters discussed    OTHER MEDICATIONS:  chlorhexidine 0.12% Liquid 15 milliLiter(s) Oral Mucosa every 12 hours  chlorhexidine 2% Cloths 1 Application(s) Topical <User Schedule>  neostigmine Injectable 2 milliGRAM(s) IV Push once  neostigmine Injectable 2 milliGRAM(s) IV Push once      IMAGING:

## 2024-11-22 NOTE — CHART NOTE - NSCHARTNOTEFT_GEN_A_CORE
Called by SICU for patient with ileus vs [place]. Patient with rectal tube placed, s/p laxatives and neostigmine. Xray with cecal diameter about 7.9cm.  - Would continue conservative management given cecal diameter <10 (maximum diameter that have high rates of perforation is >12cm) and risk of colonoscopy would outweigh the benefits at this time   - Will evaluate this patient in the AM and continue to monitor for BMs   - Serial abdominal exams and Xrays  - Will review AM imaging and assess need for future endosopic intervention     Case reviewed with Dr. Tubbs

## 2024-11-23 NOTE — PROGRESS NOTE ADULT - SUBJECTIVE AND OBJECTIVE BOX
HPI:  Patient seen and examined at bedside in SICU.  Patient remains intubated, sedated, and in atrial fibrillation.    Review Of Systems:       Unable to assess       Medications:  albuterol/ipratropium for Nebulization 3 milliLiter(s) Nebulizer every 6 hours  buDESOnide    Inhalation Suspension 0.5 milliGRAM(s) Inhalation two times a day  calcium carbonate 1250 mG  + Vitamin D (OsCal 500 + D) 1 Tablet(s) Oral every 12 hours  calcium gluconate IVPB 2 Gram(s) IV Intermittent once  chlorhexidine 0.12% Liquid 15 milliLiter(s) Oral Mucosa every 12 hours  chlorhexidine 2% Cloths 1 Application(s) Topical <User Schedule>  cycloSPORINE  , modified (GENGRAF) Solution 50 milliGRAM(s) Enteral Tube <User Schedule>  finasteride 5 milliGRAM(s) Oral daily  fluconAZOLE IVPB 400 milliGRAM(s) IV Intermittent every 24 hours  furosemide   Injectable 40 milliGRAM(s) IV Push once  heparin   Injectable 5000 Unit(s) SubCutaneous every 12 hours  HYDROmorphone  Injectable 1 milliGRAM(s) IV Push every 3 hours PRN  insulin lispro (ADMELOG) corrective regimen sliding scale   SubCutaneous every 6 hours  insulin NPH human recombinant 9 Unit(s) SubCutaneous every 6 hours  levETIRAcetam  IVPB 500 milliGRAM(s) IV Intermittent every 12 hours  linezolid  IVPB      linezolid  IVPB 600 milliGRAM(s) IV Intermittent every 12 hours  LORazepam   Injectable 1 milliGRAM(s) IV Push every 12 hours  meropenem  IVPB 1000 milliGRAM(s) IV Intermittent every 8 hours  metoprolol tartrate 12.5 milliGRAM(s) Enteral Tube every 6 hours  multiple electrolytes Injection Type 1 1000 milliLiter(s) IV Continuous <Continuous>  pantoprazole  Injectable 40 milliGRAM(s) IV Push daily  propofol Infusion 5 MICROgram(s)/kG/Min IV Continuous <Continuous>  sodium chloride 3%  Inhalation 4 milliLiter(s) Inhalation every 6 hours  ursodiol Suspension 300 milliGRAM(s) Oral every 12 hours    PAST MEDICAL & SURGICAL HISTORY:  Diabetes      Transaminitis      Paroxysmal atrial fibrillation      Depression      BPH (benign prostatic hyperplasia)      Hypertension      Chronic atrial fibrillation      Coronary artery disease      Hepatocellular carcinoma      DM (diabetes mellitus)      HTN (hypertension)      Paroxysmal atrial fibrillation      Cirrhosis      HCC (hepatocellular carcinoma)      History of BPH      History of laparoscopic cholecystectomy      History of lumbar laminectomy      H/O prior ablation treatment      H/O percutaneous left heart catheterization        Vitals:  T(C): 36.6 (11-24-24 @ 15:00), Max: 97.9 (11-24-24 @ 15:00)  HR: 152 (11-24-24 @ 17:30) (68 - 152)  BP: 158/69 (11-24-24 @ 17:30) (75/50 - 158/69)  BP(mean): 99 (11-24-24 @ 17:30) (56 - 108)  RR: 21 (11-24-24 @ 17:30) (9 - 40)  SpO2: 100% (11-24-24 @ 17:30) (90% - 100%)  Wt(kg): --  Daily     Daily   I&O's Summary    23 Nov 2024 07:01  -  24 Nov 2024 07:00  --------------------------------------------------------  IN: 3256.6 mL / OUT: 4210 mL / NET: -953.4 mL    24 Nov 2024 07:01  -  24 Nov 2024 18:00  --------------------------------------------------------  IN: 884.9 mL / OUT: 860 mL / NET: 24.9 mL        Physical Exam:  Appearance: Intubated, sedated  Eyes: PERRL, EOMI, pink conjunctiva  HENT: +ET tube  Cardiovascular: irregular  Respiratory: Clear to auscultation bilaterally  Gastrointestinal: soft, non-tender, non-distended with normal bowel sounds  Musculoskeletal: No clubbing; no joint deformity                             11.2   14.41 )-----------( 209      ( 24 Nov 2024 00:22 )             37.4     11-24    143  |  111[H]  |  39[H]  ----------------------------<  135[H]  3.7   |  23  |  1.20    Ca    7.3[L]      24 Nov 2024 00:22  Phos  2.5     11-24  Mg     2.4     11-24    TPro  4.4[L]  /  Alb  2.7[L]  /  TBili  0.6  /  DBili  x   /  AST  29  /  ALT  42  /  AlkPhos  73  11-24    PT/INR - ( 24 Nov 2024 00:22 )   PT: 15.6 sec;   INR: 1.36 ratio         PTT - ( 24 Nov 2024 00:22 )  PTT:24.6 sec      Cardiovascular Diagnostic Testing:  ECG: sinus rhythm    Echo: < from: Intra-Operative Transesophageal Echo W or WO Ultrasound Enhancing Agent (11.15.24 @ 07:46) >  --------------------------------------------------------------------------------  PRE-BYPASS FINDINGS     Left Ventricle:  Left ventricular ejection fraction is estimated at 60 to 65%. Normal left ventricularwall thickness. The left ventricular systolic function is normal There are no regional wall motion abnormalities seen.     Right Ventricle:  The right ventricular cavity is normal in size, with normal wall thickness and right ventricular systolic function is normal. Right ventricular systolic function is normal.     Left Atrium:  The left atrium is normal in size. No thrombus visualized in left atrial appendage.     Right Atrium:  The right atrium is normal in size. The right atrium is normal in size.     Interatrial Septum:  No PFO visualized with color flow doppler.     Aortic Valve:  The aortic valve appears trileaflet. There is no aortic valve stenosis. There is trace aortic regurgitation.     Mitral Valve:  There is no mitral valve stenosis. There is no mitral valve stenosis. There is trace mitral regurgitation.     Tricuspid Valve:  There is no evidence of tricuspid stenosis. There is trace tricuspid regurgitation.     Pericardium:  No pericardial effusion seen.     Post-Bypass:  S/P OLT. Under current loading conditions, hyperdynamic left ventricular systolic function, EF: 70-75% by visual estimate, no RWMA. Normal right ventricular systolic function. All other findings unchanged. Probe removed atraumatically, no blood. The patient tolerated the procedure well and without complications. Permanent recorded images are stored in the medical record.     Electronically signed by James Villareal on 11/15/2024 at 2:18:16 PM         *** Final ***    < end of copied text >      Stress Testing:     Cath: 4/24/2024, patient had his LHC repeated with Ben Villareal MD at [facility].  Cath showed multivessel coronary artery disease, but the lesions previously noted were FFR negative.  He continues to have MIRTA 3 flow throughout.    Interpretation of Telemetry: AFib with RVR    Imaging:

## 2024-11-23 NOTE — PROGRESS NOTE ADULT - ASSESSMENT
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  Stable perihepatic fluid collection measuring 4.5 x 1.9 x 3.4 cm.   -diet NPO  -PT/OT/SCDs/spirometry  -bowel regimen as able  -ASA/SQH  -leukocytosis and fever curve, cultures negative, but concern for aspiration PNA and possible ileus on imaging 11/21. Transplant ID consulted. on Zosyn for now  -Lasix 40 IV x 2 as able throughout the day  -Reglan as needed    [ ] AMS   -reintubated, vent per SICU  -Ativan taper, per family was on higher dose Xanax PO, Transplant Psych following  -EEG 11/20: Mild diffuse cerebral dysfunction is nonspecific in etiology. No epileptiform abnormalities or seizures.  -home psych meds as able    [ ] Immunosuppression  -FK HELD, expect Cyclo start this weekend, MMF HELD, SST  -SIMULECT completed  -PPx: Valcyte/Bactrim/PPI/OsCal/Fluc    [ ] HTN/ pAFib  - BB as able  - Dr. 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 Transplant ID consulted. on Meropenem. Follow cultures  -diuresis as able  -Reglan as needed  -rectal tube in place    [ ] AMS   -reintubated, vent per SICU  -Ativan taper, per family was on higher dose Xanax PO, Transplant Psych following  -EEG 11/20: Mild diffuse cerebral dysfunction is nonspecific in etiology. No epileptiform abnormalities or seizures.  -home psych meds as able    [ ] Immunosuppression  -FK HELD, expect Cyclo start this weekend, MMF HELD, SST  -SIMULECT completed  -PPx: Valcyte/Bactrim/PPI/OsCal/Fluc    [ ] HTN/ pAFib  - BB as able  - EP following, ongoing discussions about AYDEN/Cardioversions/full A/C  - Dr. 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 Transplant ID consulted. on Meropenem. Follow cultures  -diuresis as able  -Reglan as needed  -rectal tube in place    [ ] AMS   -reintubated, vent per SICU, extubate as able  -Ativan taper, per family was on higher dose Xanax PO, Transplant Psych following  -EEG 11/20: Mild diffuse cerebral dysfunction is nonspecific in etiology. No epileptiform abnormalities or seizures.  -home psych meds as able    [ ] Immunosuppression  -FK HELD, expect Cyclo start this weekend, MMF HELD, SST  -SIMULECT completed  -PPx: Valcyte/Bactrim/PPI/OsCal/Fluc    [ ] HTN/ pAFib  - BB as able  - EP following, ongoing discussions about AYDEN/Cardioversions/full A/C  - Dr. Quintanilla following    [ ] DM  -Lantus/Lispro, adjust prn  -Endocrine as needed given steroids

## 2024-11-23 NOTE — PROGRESS NOTE ADULT - ASSESSMENT
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 Pt underwent Y90 Sept, 2023 with favorable treatment response.s/p OLT 11/15/24     # S/p OLT 11/15/24 CMV D?R? EBV , toxo D?/R?  VAlcyte, bactrim ppx  fluconazole ppx    # Fever, sepsis, hypoxic respiratory failure s/p on mechanical ventilation  Ct C/A/P Bilateral lower lobe consolidation with fluid and debris in the right lower lobe bronchi, concerning for aspiration pneumonia.  Small right pleural effusion.  Postoperative changes status post liver transplant. No evidence of drainable intra-abdominal fluid collection.  Fluid and gaseous distention of the colon, likely ileus.    # ileus  diarrhea    Recommend  ·	BC x2 today  ·	UA, UC, repeat RVP in full  ·	Send miniBAL for bacterial, fungal cultures, ASp GM, full RVP, legionella PCR  ·	MRSA nare swab  ·	In light of persistent fevers and diarrhea also cover for VRE.MRSA with linezolid pending repeat cx  ·	Continue Meropenem 1 gram q 8hr pending the repeat cultures  ·	Send Stool for Cdiff testing  ·	Check CMV PCR, ADenovirus PCR in plasma, Cryptococcus antigen in serum        Thank you for involving us in the care of this patient  Transplant ID will continue to follow  Please call or page with additional questions  Pager; #[phone]  Teams: from 8 am to 5 pm  Rachele Lewis MD     73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 Pt underwent Y90 Sept, 2023 with favorable treatment response.s/p OLT 11/15/24     # S/p OLT 11/15/24 CMV D?R? EBV , toxo D?/R?  VAlcyte, bactrim ppx  fluconazole ppx    # Fever, sepsis, hypoxic respiratory failure s/p on mechanical ventilation  Ct C/A/P Bilateral lower lobe consolidation with fluid and debris in the right lower lobe bronchi, concerning for aspiration pneumonia.  Small right pleural effusion.  Postoperative changes status post liver transplant. No evidence of drainable intra-abdominal fluid collection.  Fluid and gaseous distention of the colon, likely ileus.    # ileus  diarrhea    Recommend  ·	BC x2 today  ·	UA, UC, repeat RVP in full  ·	Send miniBAL for bacterial, fungal cultures, ASp GM, full RVP, legionella PCR  ·	MRSA nare swab  ·	In light of persistent fevers and diarrhea also cover for VRE.MRSA with linezolid pending repeat cx  ·	Continue Meropenem 1 gram q 8hr pending the repeat cultures  ·	Send Stool for Cdiff testing if diarrhea  ·	Check CMV PCR, ADenovirus PCR in plasma, Cryptococcus antigen in serum        Thank you for involving us in the care of this patient  Transplant ID will continue to follow  Please call or page with additional questions  Pager; #[phone]  Teams: from 8 am to 5 pm  Rachele Lewis MD     73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 Pt underwent Y90 Sept, 2023 with favorable treatment response.s/p OLT 11/15/24     # S/p OLT 11/15/24 CMV D?R? EBV , toxo D?/R?  VAlcyte, bactrim ppx  fluconazole ppx    # Fever, sepsis, hypoxic respiratory failure s/p on mechanical ventilation  Ct C/A/P Bilateral lower lobe consolidation with fluid and debris in the right lower lobe bronchi, concerning for aspiration pneumonia.  Small right pleural effusion.  Postoperative changes status post liver transplant. No evidence of drainable intra-abdominal fluid collection.  Fluid and gaseous distention of the colon, likely ileus.    # ileus  diarrhea    Recommend  ·	BC x2 today  ·	UA, UC, repeat RVP in full  ·	Send miniBAL for bacterial, fungal cultures, ASp GM, full RVP, legionella PCR  ·	MRSA nare swab  ·	In light of persistent fevers and diarrhea also cover for VRE.MRSA with linezolid pending repeat cx  ·	Continue Meropenem 1 gram q 8hr pending the repeat cultures  ·	Send Stool for Cdiff testing if diarrhea  ·	Check CMV PCR, ADenovirus PCR in plasma, Cryptococcus antigen in serum  ·	review donor cultures and chart    Son at bedside, answered all his questions    Thank you for involving us in the care of this patient  Transplant ID will continue to follow  Please call or page with additional questions  Pager; #[phone]  Teams: from 8 am to 5 pm  Rachele Lewis MD     73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 Pt underwent Y90 Sept, 2023 with favorable treatment response.s/p OLT 11/15/24     # S/p OLT 11/15/24 CMV D?R? EBV , toxo D?/R?  VAlcyte, bactrim ppx  fluconazole ppx    # Fever, sepsis, hypoxic respiratory failure s/p on mechanical ventilation  Ct C/A/P Bilateral lower lobe consolidation with fluid and debris in the right lower lobe bronchi, concerning for aspiration pneumonia.  Small right pleural effusion.  Postoperative changes status post liver transplant. No evidence of drainable intra-abdominal fluid collection.  Fluid and gaseous distention of the colon, likely ileus.    # ileus  no diarrhea    Recommend  ·	BC x2 today  ·	UA, UC, repeat RVP in full  ·	Send miniBAL for bacterial, fungal cultures, ASp GM, full RVP, legionella PCR  ·	MRSA nare swab  ·	In light of persistent fevers and diarrhea also cover for VRE.MRSA with linezolid pending repeat cx  ·	Continue Meropenem 1 gram q 8hr pending the repeat cultures  ·	Send Stool for Cdiff testing if diarrhea  ·	Check CMV PCR, ADenovirus PCR in plasma, Cryptococcus antigen in serum  ·	review donor cultures and chart    Son at bedside, answered all his questions    Thank you for involving us in the care of this patient  Transplant ID will continue to follow  Please call or page with additional questions  Pager; #[phone]  Teams: from 8 am to 5 pm  Rachele Lewis MD

## 2024-11-23 NOTE — PROGRESS NOTE ADULT - ASSESSMENT
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 Post-op course c/b worsening mental status and re-intubation 11/20 for acute hypoxemic respiratory failure 2/2 aspiration.     PLAN:  Neuro:  - Sedation with precedex gtt  - pain control w/ Dilaudid   - takes xanax 2mg TID at home  - Benzo taper: Ativan 1mg TID x3d --> 1mg BID x3d  - CT head 11/19 and 11/21 negative  - Keppra restarted: 500mg BID   - Holding home xanax, Abilify, Zoloft, Remeron, Lexapro   - Ammonia 23     Resp:  - Intubated 11/20 2/2 acute hypoxemic respiratory failure 2/2 aspiration   - PRVC 14/540/8/40  - SBT attempted and stopped 11/22 for tachycardia   - Home budesonide & duonebs    CV:  - Stopped esmolol gtt, metoprolol 5mg x1   - Off levo   - goal MAP > 65 mmHg    GI:   - Increasing abdominal distension   - Abd xray showing dilated bowel   - Rectal red rubber tube   - NPO w/ OGT to suction   - Abd XR 11/20: Ileus   - Bowel regimen Movantix qd x 3 days, holding miralax/senna  - Protonix for stress ulcer prophylaxis  - ALYSSA x2, monitor output  - post-op liver doppler w/ patent vasculature   - F/u CT chest, A/P 11/21  - S/p Neostigmine 2mg x1 11/21    Renal:  - Monitor I&Os and electrolytes w/ repletions as necessary  - NS @30 for MORAIMA   - Hold diuresis   - Monitor I/Os    Heme:  - Monitor CBC and coags  - SQH BID for VTE prophylaxis  - ASA per rectum     ID:   - Monitor for clinical evidence of active infection  - Monitor WBC, temperature, and procalcitonin  - Transplant ppx w/ bactrim, valcyte, fluconazole  - immunosuppression w/ steroids, tacro and Cellcept   - Blood cx 11/20 NGTD  - Zosyn (11/20 - 11/22)  - Meropenem (11/22 -   - Procal 0.23, trend q48hr    Endo:   - Monitor glucose   - moderate ISS  - post-transplant steroid taper     Lines:   - PIVs  - nichols  - OGT  - JPs x2

## 2024-11-23 NOTE — CONSULT NOTE ADULT - ASSESSMENT
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 GI consulted for post-op ileus.     #S/p OLT  #Ileus vs Warren.   Patient with rectal tube placed, s/p laxatives and neostigmine. Xray with cecal diameter about 7.9cm.    Recommendations:  Recommendations:  -Daily KUB to assess ileus   -Avoid opiates/ anticholinergics/ calcium channel blockers if possible.  -Check electrolytes daily with aggressive electrolyte repletion with goal mg 2.0, phos 3.0, K 4.0.  -Continue with rectal tube   -Shift patient/ mobilize   -IV fluid at maintenance dose   -Would continue conservative management given cecal diameter <10 (maximum diameter that have high rates of perforation is >12cm) and risk of colonoscopy would outweigh the benefits at this time

## 2024-11-23 NOTE — PROGRESS NOTE ADULT - NS ATTEND AMEND GEN_ALL_CORE FT
febrile -- cultures drawn. On Meropenem.  ID following.  was on esmolol gtt for Amaris, now IV betablocker and dig loaded.  Ativan taper done today.  wean to extubate today if possible.  distended colon, rectal tube placed, yellow mucous output.  Immuno: holding CNI, cellcept held.  pred 20 monotherapy.

## 2024-11-23 NOTE — PROGRESS NOTE ADULT - ASSESSMENT
74 year-old with known coronary artery disease as above presents for liver transplant.  Seen to have chest pain post transplant.  It was atypical of angina and has resolved.  Troponin remained negative.    Now with atrial fibrillation with RVR (11/19) - rate control with beta-blocker as needed, now status post Digoxin load    Rate control with betablocker and Digoxin  Anticoagulation when cleared by surgery  EP consult and follow-up appreciated

## 2024-11-23 NOTE — CONSULT NOTE ADULT - SUBJECTIVE AND OBJECTIVE BOX
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 GI consulted for post-op ileus. Patient was trial on laxatives and neostigmine without BM. He had abd distention which worsened last night. Rectal tube placed last night with evidence of decompression as abd softer and xray with decreased distention.      PAST MEDICAL & SURGICAL HISTORY:  Diabetes      Transaminitis      Paroxysmal atrial fibrillation      Depression      BPH (benign prostatic hyperplasia)      Hypertension      Chronic atrial fibrillation      Coronary artery disease      Hepatocellular carcinoma      DM (diabetes mellitus)      HTN (hypertension)      Paroxysmal atrial fibrillation      Cirrhosis      HCC (hepatocellular carcinoma)      History of BPH      History of laparoscopic cholecystectomy      History of lumbar laminectomy      H/O prior ablation treatment      H/O percutaneous left heart catheterization        FAMILY HISTORY:  Family history of coronary artery disease (Father, Sibling, Sibling)    Family history of diabetes mellitus (Father, Mother)    Family history of coronary artery disease (Sibling)        MEDS:  MEDICATIONS  (STANDING):  aspirin  chewable 81 milliGRAM(s) Enteral Tube daily  buDESOnide    Inhalation Suspension 0.5 milliGRAM(s) Inhalation two times a day  calcium carbonate 1250 mG  + Vitamin D (OsCal 500 + D) 1 Tablet(s) Oral two times a day  chlorhexidine 0.12% Liquid 15 milliLiter(s) Oral Mucosa every 12 hours  chlorhexidine 2% Cloths 1 Application(s) Topical <User Schedule>  dexMEDEtomidine Infusion 1.5 MICROgram(s)/kG/Hr (35.1 mL/Hr) IV Continuous <Continuous>  fluconAZOLE IVPB 400 milliGRAM(s) IV Intermittent every 24 hours  furosemide   Injectable 40 milliGRAM(s) IV Push every 8 hours  heparin   Injectable 5000 Unit(s) SubCutaneous every 12 hours  insulin lispro (ADMELOG) corrective regimen sliding scale   SubCutaneous every 6 hours  insulin NPH human recombinant 9 Unit(s) SubCutaneous every 6 hours  levETIRAcetam  IVPB 500 milliGRAM(s) IV Intermittent every 12 hours  linezolid  IVPB      linezolid  IVPB 600 milliGRAM(s) IV Intermittent every 12 hours  meropenem  IVPB 1000 milliGRAM(s) IV Intermittent every 8 hours  metoprolol tartrate Injectable 5 milliGRAM(s) IV Push every 4 hours  pantoprazole  Injectable 40 milliGRAM(s) IV Push daily  phenylephrine    Infusion 0.2 MICROgram(s)/kG/Min (7.02 mL/Hr) IV Continuous <Continuous>  predniSONE   Tablet 20 milliGRAM(s) Oral daily  sodium chloride 0.9% 1000 milliLiter(s) (30 mL/Hr) IV Continuous <Continuous>  trimethoprim  40 mG/sulfamethoxazole 200 mG Suspension 80 milliGRAM(s) Enteral Tube daily  ursodiol Suspension 300 milliGRAM(s) Oral two times a day  valGANciclovir 50 mG/mL Oral Solution 450 milliGRAM(s) Oral daily    MEDICATIONS  (PRN):  HYDROmorphone  Injectable 1 milliGRAM(s) IV Push every 3 hours PRN Breakthrough Pain    Allergies    No Known Allergies    Intolerances      ROS: unable to obtain     ______________________________________________________________________  PHYSICAL EXAM:  T(C): 38.2 (11-23-24 @ 15:00), Max: 38.5 (11-23-24 @ 14:00)  HR: 86 (11-23-24 @ 17:00)  BP: 97/55 (11-23-24 @ 17:00)  RR: 18 (11-23-24 @ 17:00)  SpO2: 100% (11-23-24 @ 17:00)  Wt(kg): --    11-22 - 11-23  --------------------------------------------------------  IN:    Dexmedetomidine: 241.3 mL    Enteral Tube Flush: 230 mL    Esmolol: 84.1 mL    IV PiggyBack: 525 mL    IV PiggyBack: 100 mL    IV PiggyBack: 100 mL    Phenylephrine: 171.5 mL    sodium chloride 0.9% w/ Additives: 1260 mL  Total IN: 2711.9 mL    OUT:    Bulb (mL): 320 mL    Bulb (mL): 480 mL    Indwelling Catheter - Urethral (mL): 2880 mL    Norepinephrine: 0 mL    Rectal Tube (mL): 80 mL  Total OUT: 3760 mL    Total NET: -1048.1 mL      11-23  -  11-23  --------------------------------------------------------  IN:    Dexmedetomidine: 94 mL    Enteral Tube Flush: 100 mL    IV PiggyBack: 499.8 mL    IV PiggyBack: 650 mL    Phenylephrine: 108.7 mL    sodium chloride 0.9% w/ Additives: 90 mL  Total IN: 1542.5 mL    OUT:    Bulb (mL): 200 mL    Bulb (mL): 160 mL    Esmolol: 0 mL    Indwelling Catheter - Urethral (mL): 1745 mL    Norepinephrine: 0 mL  Total OUT: 2105 mL    Total NET: -562.5 mL          GEN: NAD, normocephalic  CVS: S1S2+  CHEST: clear to auscultation, intubated  ABD: soft , nontender, mildly distended, bowel sounds present  EXTR: no cyanosis, no clubbing, no edema  NEURO: Alert  SKIN:  warm;  non icteric    ______________________________________________________________________  LABS:                        10.0   14.70 )-----------( 231      ( 23 Nov 2024 11:19 )             32.2     11-23    143  |  113[H]  |  44[H]  ----------------------------<  156[H]  4.2   |  21[L]  |  0.91    Ca    8.1[L]      23 Nov 2024 11:20  Phos  2.7     11-23  Mg     2.8     11-23    TPro  4.5[L]  /  Alb  2.6[L]  /  TBili  0.6  /  DBili  x   /  AST  27  /  ALT  44  /  AlkPhos  76  11-23    LIVER FUNCTIONS - ( 23 Nov 2024 11:20 )  Alb: 2.6 g/dL / Pro: 4.5 g/dL / ALK PHOS: 76 U/L / ALT: 44 U/L / AST: 27 U/L / GGT: x           PT/INR - ( 23 Nov 2024 11:20 )   PT: 15.0 sec;   INR: 1.31 ratio         PTT - ( 23 Nov 2024 11:20 )  PTT:19.4 sec  ____________________________________________

## 2024-11-23 NOTE — PROGRESS NOTE ADULT - SUBJECTIVE AND OBJECTIVE BOX
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 Pt underwent Y90 Sept, 2023 with favorable treatment response.Pt completed OLTx evaluation now s/p OLT on 11/15/24  Course complicated with fevers, leucocytosis acute decompensation, ? aspiration pneumonia    Transplant ID consulted for help with management  Remains intubated, on pressors      Mode: CPAP with PS  FiO2: 40  PEEP: 5  PS: 5  MAP: 7  PIP: 11            ____________________________________________________  ROS  GENERAL: denies chills, , night sweats, weight loss.   PSYCH: denies depression, anxiety, suicidal ideation, hallucination, and delusions  SKIN: no rash or lesions; no color changes, no abnormal nevi,no  dryness, and nojaundice    EYES: denies visual changes, floaters, pain, inflammation, blurred vision, and discharge  ENT: denies tinnitus, vertigo, epistaxis, oral lesion, and decreased acuity  PULM: denies, hemoptysis, pleurisy  CVS: denies angina, palpitations,+ orthopnea, no syncope, or heart murmur  GI: denies constipation, diarrhea, melena, abdominal pain, nausea.   : denies dysuria, frequency, discharge, incontinence, stones or macroscopic hematuria  MS: no arthralgias, no erythema or swelling, no myalgias, noedema, or lower back pain.   CNS: denies numbness, dizziness, seizure, or tremor  ENDO: denies heat/cold intolerance, polyuria, polydipsia, malaise.    HEME: denies bruising, bleeding, lymphadenopathy, anemia, and calf pain    Allergies  No Known Allergies    __________________________________________________  MEDS:  MEDICATIONS  (STANDING):  albuterol/ipratropium for Nebulization 3 every 6 hours  aspirin  chewable 81 daily  buDESOnide    Inhalation Suspension 0.5 two times a day  dexMEDEtomidine Infusion 1.5 <Continuous>  digoxin  Injectable 250 once  furosemide   Injectable 40 every 8 hours  heparin   Injectable 5000 every 12 hours  HYDROmorphone  Injectable 1 every 3 hours PRN  insulin lispro (ADMELOG) corrective regimen sliding scale  every 6 hours  insulin NPH human recombinant 9 every 6 hours  levETIRAcetam  IVPB 500 every 12 hours  LORazepam   Injectable 1 every 8 hours  metoprolol tartrate Injectable 5 every 4 hours  pantoprazole  Injectable 40 daily  phenylephrine    Infusion 0.2 <Continuous>  predniSONE   Tablet 20 daily  sodium chloride 3%  Inhalation 4 every 6 hours  ursodiol Suspension 300 two times a day    _________________________________________________  ANTIMICOBIALS  fluconAZOLE IVPB 400 every 24 hours  meropenem  IVPB 1000 every 8 hours  trimethoprim  40 mG/sulfamethoxazole 200 mG Suspension 80 daily  valGANciclovir 50 mG/mL Oral Solution 450 daily      GENERAL LABS              10.1                 x    | x    | x            16.19 >-----------< 225     ------------------------< x                     32.7                 x    | x    | x                                            Ca x     Mg x     Ph x          Urinalysis Basic - ( 23 Nov 2024 06:18 )    Color: x / Appearance: x / SG: x / pH: x  Gluc: 134 mg/dL / Ketone: x  / Bili: x / Urobili: x   Blood: x / Protein: x / Nitrite: x   Leuk Esterase: x / RBC: x / WBC x   Sq Epi: x / Non Sq Epi: x / Bacteria: x        _________________________________________________  MICROBIOLOGY  -----------    Culture - Blood (collected 20 Nov 2024 11:53)  Source: .Blood BLOOD  Preliminary Report (22 Nov 2024 16:15):    No growth at 48 Hours    Culture - Blood (collected 20 Nov 2024 11:43)  Source: .Blood BLOOD  Preliminary Report (22 Nov 2024 16:15):    No growth at 48 Hours                    Fungitell:   _______________________________________________  PERTINENT IMAGING       73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 Pt underwent Y90 Sept, 2023 with favorable treatment response.Pt completed OLTx evaluation now s/p OLT on 11/15/24  Course complicated with fevers, leucocytosis acute decompensation, ? aspiration pneumonia    Transplant ID consulted for help with management  Remains intubated, on pressors  red rubber placed, ileus on CT      Mode: CPAP with PS  FiO2: 40  PEEP: 5  PS: 5  MAP: 7  PIP: 11            ____________________________________________________  ROS  GENERAL: denies chills, , night sweats, weight loss.   PSYCH: denies depression, anxiety, suicidal ideation, hallucination, and delusions  SKIN: no rash or lesions; no color changes, no abnormal nevi,no  dryness, and nojaundice    EYES: denies visual changes, floaters, pain, inflammation, blurred vision, and discharge  ENT: denies tinnitus, vertigo, epistaxis, oral lesion, and decreased acuity  PULM: denies, hemoptysis, pleurisy  CVS: denies angina, palpitations,+ orthopnea, no syncope, or heart murmur  GI: denies constipation, diarrhea, melena, abdominal pain, nausea.   : denies dysuria, frequency, discharge, incontinence, stones or macroscopic hematuria  MS: no arthralgias, no erythema or swelling, no myalgias, noedema, or lower back pain.   CNS: denies numbness, dizziness, seizure, or tremor  ENDO: denies heat/cold intolerance, polyuria, polydipsia, malaise.    HEME: denies bruising, bleeding, lymphadenopathy, anemia, and calf pain    Allergies  No Known Allergies    __________________________________________________  MEDS:  MEDICATIONS  (STANDING):  albuterol/ipratropium for Nebulization 3 every 6 hours  aspirin  chewable 81 daily  buDESOnide    Inhalation Suspension 0.5 two times a day  dexMEDEtomidine Infusion 1.5 <Continuous>  digoxin  Injectable 250 once  furosemide   Injectable 40 every 8 hours  heparin   Injectable 5000 every 12 hours  HYDROmorphone  Injectable 1 every 3 hours PRN  insulin lispro (ADMELOG) corrective regimen sliding scale  every 6 hours  insulin NPH human recombinant 9 every 6 hours  levETIRAcetam  IVPB 500 every 12 hours  LORazepam   Injectable 1 every 8 hours  metoprolol tartrate Injectable 5 every 4 hours  pantoprazole  Injectable 40 daily  phenylephrine    Infusion 0.2 <Continuous>  predniSONE   Tablet 20 daily  sodium chloride 3%  Inhalation 4 every 6 hours  ursodiol Suspension 300 two times a day    _________________________________________________  ANTIMICOBIALS  fluconAZOLE IVPB 400 every 24 hours  meropenem  IVPB 1000 every 8 hours  trimethoprim  40 mG/sulfamethoxazole 200 mG Suspension 80 daily  valGANciclovir 50 mG/mL Oral Solution 450 daily      GENERAL LABS              10.1                 x    | x    | x            16.19 >-----------< 225     ------------------------< x                     32.7                 x    | x    | x                                            Ca x     Mg x     Ph x          Urinalysis Basic - ( 23 Nov 2024 06:18 )    Color: x / Appearance: x / SG: x / pH: x  Gluc: 134 mg/dL / Ketone: x  / Bili: x / Urobili: x   Blood: x / Protein: x / Nitrite: x   Leuk Esterase: x / RBC: x / WBC x   Sq Epi: x / Non Sq Epi: x / Bacteria: x        _________________________________________________  MICROBIOLOGY  -----------    Culture - Blood (collected 20 Nov 2024 11:53)  Source: .Blood BLOOD  Preliminary Report (22 Nov 2024 16:15):    No growth at 48 Hours    Culture - Blood (collected 20 Nov 2024 11:43)  Source: .Blood BLOOD  Preliminary Report (22 Nov 2024 16:15):    No growth at 48 Hours                    Fungitell:   _______________________________________________  PERTINENT IMAGING       73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 Pt underwent Y90 Sept, 2023 with favorable treatment response.Pt completed OLTx evaluation now s/p OLT on 11/15/24  Course complicated with fevers, leucocytosis acute decompensation, ? aspiration pneumonia    Transplant ID consulted for help with management  Remains intubated, on pressors  red rubber placed, ileus on CT      Mode: CPAP with PS  FiO2: 40  PEEP: 5  PS: 5  MAP: 7  PIP: 11      T(C): 38.2 (11-23-24 @ 15:00), Max: 38.5 (11-23-24 @ 14:00)  HR: 85 (11-23-24 @ 15:30) (72 - 137)  BP: 88/56 (11-23-24 @ 15:30) (72/50 - 128/63)  RR: 21 (11-23-24 @ 15:30) (15 - 26)  SpO2: 100% (11-23-24 @ 15:30) (93% - 100%)    CONSTITUTIONAL: Well groomed,intubated, awake  EYES: PERRLA and symmetric, EOMI, No conjunctival or scleral injection, non-icteric  ENMT: Oral mucosa with moist membranes. Normal dentition; no pharyngeal injection or exudates             NECK: Supple, symmetric and without tracheal deviation   RESP: No respiratory distress, no use of accessory muscles; CTA b/l, no WRR  CV: RRR, +S1S2, no MRG; no JVD; no peripheral edema  GI:distended, soft    LYMPH: No cervical LAD or tenderness; no axillary LAD or tenderness; no inguinal LAD or tenderness  MSK: Normal gait; No digital clubbing or cyanosis; examination of the (head/neck/spine/ribs/pelvis, RUE, LUE, RLE, LLE) without misalignment,            Normal ROM without pain, no spinal tenderness, normal muscle strength/tone  SKIN: No rashes or ulcers noted; no subcutaneous nodules or induration palpable  NEURO: alert following commands, good strength    ____________________________________________________  ROS  GENERAL: denies chills, , night sweats, weight loss.   PSYCH: denies depression, anxiety, suicidal ideation, hallucination, and delusions  SKIN: no rash or lesions; no color changes, no abnormal nevi,no  dryness, and nojaundice    EYES: denies visual changes, floaters, pain, inflammation, blurred vision, and discharge  ENT: denies tinnitus, vertigo, epistaxis, oral lesion, and decreased acuity  PULM: denies, hemoptysis, pleurisy  CVS: denies angina, palpitations,+ orthopnea, no syncope, or heart murmur  GI: denies constipation, diarrhea, melena, abdominal pain, nausea.   : denies dysuria, frequency, discharge, incontinence, stones or macroscopic hematuria  MS: no arthralgias, no erythema or swelling, no myalgias, noedema, or lower back pain.   CNS: denies numbness, dizziness, seizure, or tremor  ENDO: denies heat/cold intolerance, polyuria, polydipsia, malaise.    HEME: denies bruising, bleeding, lymphadenopathy, anemia, and calf pain    Allergies  No Known Allergies    __________________________________________________  MEDS:  MEDICATIONS  (STANDING):  albuterol/ipratropium for Nebulization 3 every 6 hours  aspirin  chewable 81 daily  buDESOnide    Inhalation Suspension 0.5 two times a day  dexMEDEtomidine Infusion 1.5 <Continuous>  digoxin  Injectable 250 once  furosemide   Injectable 40 every 8 hours  heparin   Injectable 5000 every 12 hours  HYDROmorphone  Injectable 1 every 3 hours PRN  insulin lispro (ADMELOG) corrective regimen sliding scale  every 6 hours  insulin NPH human recombinant 9 every 6 hours  levETIRAcetam  IVPB 500 every 12 hours  LORazepam   Injectable 1 every 8 hours  metoprolol tartrate Injectable 5 every 4 hours  pantoprazole  Injectable 40 daily  phenylephrine    Infusion 0.2 <Continuous>  predniSONE   Tablet 20 daily  sodium chloride 3%  Inhalation 4 every 6 hours  ursodiol Suspension 300 two times a day    _________________________________________________  ANTIMICOBIALS  fluconAZOLE IVPB 400 every 24 hours  meropenem  IVPB 1000 every 8 hours  trimethoprim  40 mG/sulfamethoxazole 200 mG Suspension 80 daily  valGANciclovir 50 mG/mL Oral Solution 450 daily      GENERAL LABS              10.1                 x    | x    | x            16.19 >-----------< 225     ------------------------< x                     32.7                 x    | x    | x                                            Ca x     Mg x     Ph x          Urinalysis Basic - ( 23 Nov 2024 06:18 )    Color: x / Appearance: x / SG: x / pH: x  Gluc: 134 mg/dL / Ketone: x  / Bili: x / Urobili: x   Blood: x / Protein: x / Nitrite: x   Leuk Esterase: x / RBC: x / WBC x   Sq Epi: x / Non Sq Epi: x / Bacteria: x        _________________________________________________  MICROBIOLOGY  -----------    Culture - Blood (collected 20 Nov 2024 11:53)  Source: .Blood BLOOD  Preliminary Report (22 Nov 2024 16:15):    No growth at 48 Hours    Culture - Blood (collected 20 Nov 2024 11:43)  Source: .Blood BLOOD  Preliminary Report (22 Nov 2024 16:15):    No growth at 48 Hours                    Fungitell:   _______________________________________________  PERTINENT IMAGING

## 2024-11-23 NOTE — PROGRESS NOTE ADULT - SUBJECTIVE AND OBJECTIVE BOX
Remains on low dose phenylephrine    MEDICATIONS:  esmolol  Infusion 50 MICROgram(s)/kG/Min IV Continuous <Continuous>  heparin   Injectable 5000 Unit(s) SubCutaneous every 12 hours  metoprolol tartrate Injectable 5 milliGRAM(s) IV Push every 4 hours  norepinephrine Infusion 0.04 MICROgram(s)/kG/Min IV Continuous <Continuous>  phenylephrine    Infusion 0.2 MICROgram(s)/kG/Min IV Continuous <Continuous>    fluconAZOLE IVPB 400 milliGRAM(s) IV Intermittent every 24 hours  meropenem  IVPB 1000 milliGRAM(s) IV Intermittent every 8 hours  trimethoprim  40 mG/sulfamethoxazole 200 mG Suspension 80 milliGRAM(s) Enteral Tube daily  valGANciclovir 50 mG/mL Oral Solution 450 milliGRAM(s) Oral daily    albuterol/ipratropium for Nebulization 3 milliLiter(s) Nebulizer every 6 hours  buDESOnide    Inhalation Suspension 0.5 milliGRAM(s) Inhalation two times a day  sodium chloride 3%  Inhalation 4 milliLiter(s) Inhalation every 6 hours    aspirin Suppository 300 milliGRAM(s) Rectal daily  dexMEDEtomidine Infusion 1.5 MICROgram(s)/kG/Hr IV Continuous <Continuous>  HYDROmorphone  Injectable 1 milliGRAM(s) IV Push every 3 hours PRN  levETIRAcetam  IVPB 500 milliGRAM(s) IV Intermittent every 12 hours  LORazepam   Injectable 1 milliGRAM(s) IV Push every 8 hours    pantoprazole  Injectable 40 milliGRAM(s) IV Push daily  ursodiol Suspension 300 milliGRAM(s) Oral two times a day    insulin lispro (ADMELOG) corrective regimen sliding scale   SubCutaneous every 6 hours  insulin NPH human recombinant 9 Unit(s) SubCutaneous every 6 hours  predniSONE   Tablet 20 milliGRAM(s) Oral daily    calcium carbonate 1250 mG  + Vitamin D (OsCal 500 + D) 1 Tablet(s) Oral two times a day  chlorhexidine 0.12% Liquid 15 milliLiter(s) Oral Mucosa every 12 hours  chlorhexidine 2% Cloths 1 Application(s) Topical <User Schedule>  sodium chloride 0.9% 1000 milliLiter(s) IV Continuous <Continuous>      REVIEW OF SYSTEMS:  Complete 10point ROS negative.    PHYSICAL EXAM:  T(C): 37.7 (11-23-24 @ 05:00), Max: 38.4 (11-22-24 @ 20:45)  HR: 87 (11-23-24 @ 07:15) (72 - 137)  BP: 88/54 (11-23-24 @ 07:00) (72/50 - 128/66)  RR: 19 (11-23-24 @ 07:15) (15 - 26)  SpO2: 100% (11-23-24 @ 07:15) (93% - 100%)  Wt(kg): --  I&O's Summary    22 Nov 2024 07:01  -  23 Nov 2024 07:00  --------------------------------------------------------  IN: 2711.9 mL / OUT: 3760 mL / NET: -1048.1 mL      PHYSICAL EXAM:  GENERAL: intubated/sedated  ENT: Neck supple, No JVD, moist mucosa  CHEST/LUNG: mechanical breath sounds  HEART: irregular, tachycardia   ABDOMEN: distended  EXTREMITIES: No clubbing, cyanosis  NEUROLOGY: sedated  SKIN: no rashes or lesions          LABS:	 	    CBC Full  -  ( 23 Nov 2024 06:20 )  WBC Count : 16.19 K/uL  Hemoglobin : 10.1 g/dL  Hematocrit : 32.7 %  Platelet Count - Automated : 225 K/uL  Mean Cell Volume : 84.5 fl  Mean Cell Hemoglobin : 26.1 pg  Mean Cell Hemoglobin Concentration : 30.9 g/dL  Auto Neutrophil # : x  Auto Lymphocyte # : x  Auto Monocyte # : x  Auto Eosinophil # : x  Auto Basophil # : x  Auto Neutrophil % : x  Auto Lymphocyte % : x  Auto Monocyte % : x  Auto Eosinophil % : x  Auto Basophil % : x    11-23    142  |  110[H]  |  51[H]  ----------------------------<  134[H]  3.8   |  20[L]  |  1.08  11-23    141  |  107  |  58[H]  ----------------------------<  129[H]  3.3[L]   |  22  |  1.27    Ca    7.0[L]      23 Nov 2024 06:18  Ca    6.9[L]      23 Nov 2024 00:36  Phos  2.2     11-23  Phos  2.2     11-23  Mg     3.0     11-23  Mg     2.8     11-23    TPro  4.2[L]  /  Alb  2.5[L]  /  TBili  0.7  /  DBili  x   /  AST  24  /  ALT  41  /  AlkPhos  77  11-23  TPro  4.5[L]  /  Alb  2.7[L]  /  TBili  0.8  /  DBili  x   /  AST  24  /  ALT  44  /  AlkPhos  84  11-23        TELEMETRY: 	  AF 90s  	  ASSESSMENT/PLAN: 	  74 year old male, retired urologist PMH DM, HTN, pAfib s/p ablation 2018 (no AC 2/2 thrombocytopenia), CAD, depression, anxiety, BPH, 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 Pt underwent Y90 Sept, 2023 with favorable treatment response. Pt completed OLTx evaluation and listed for OLTx 5/03/2024. Now s/p OLT on 11/15/24. EP consulted for post-op Afib with RVR. Rates better off levo (on phenyphrine), post dig load, and on metoprolol ATC.     Plan  - digoxin .25 mg daily  - continue metoprolol  - once "cleared" for anticoagulation can revisit a strategy of rhythm control

## 2024-11-23 NOTE — PROGRESS NOTE ADULT - SUBJECTIVE AND OBJECTIVE BOX
24 HOUR EVENTS:  - Abd xray showing dilated bowel  - Rectal tube placed, replaced with red rubber tube  - Stopped esmolol, metoprolol 5mg x1  - Digoxin x2   - Febrile 101.1, BCx2 sent     NEURO  RASS (if intubated): 		[facility] ICU (if concern for delirium):  Exam:   Meds: aspirin Suppository 300 milliGRAM(s) Rectal daily  dexMEDEtomidine Infusion 1.5 MICROgram(s)/kG/Hr IV Continuous <Continuous>  HYDROmorphone  Injectable 1 milliGRAM(s) IV Push every 3 hours PRN Breakthrough Pain  levETIRAcetam  IVPB 500 milliGRAM(s) IV Intermittent every 12 hours  LORazepam   Injectable 1 milliGRAM(s) IV Push every 8 hours      RESPIRATORY  RR: 17 (11-23-24 @ 02:45) (15 - 26)  SpO2: 97% (11-23-24 @ 02:45) (93% - 99%)  Wt(kg): --  Exam: Lungs CTA b/l  Mechanical Ventilation: Mode: AC/ CMV (Assist Control/ Continuous Mandatory Ventilation), RR (machine): 14, RR (patient): 26, TV (machine): 540, FiO2: 40, PEEP: 5, ITime: 0.9, MAP: 11, PIP: 17  ABG - ( 21 Nov 2024 05:36 )  pH: 7.48  /  pCO2: 30    /  pO2: 91    / HCO3: 22    / Base Excess: -0.6  /  SaO2: 98.6    Lactate: x                Meds: albuterol/ipratropium for Nebulization 3 milliLiter(s) Nebulizer every 6 hours  buDESOnide    Inhalation Suspension 0.5 milliGRAM(s) Inhalation two times a day  sodium chloride 3%  Inhalation 4 milliLiter(s) Inhalation every 6 hours      CARDIOVASCULAR  HR: 101 (11-23-24 @ 02:45) (88 - 137)  BP: 102/54 (11-23-24 @ 02:45) (72/50 - 150/65)  BP(mean): 76 (11-23-24 @ 02:30) (56 - 94)  ABP: --  ABP(mean): --  Wt(kg): --  CVP(cm H2O): --  VBG - ( 23 Nov 2024 00:23 )  pH: 7.46  /  pCO2: 37    /  pO2: 48    / HCO3: 26    / Base Excess: 2.4   /  SaO2: 78.3   Lactate: 1.5                Exam: Normal S1/S2 w/o murmurs or rubs  Cardiac Rhythm:   Perfusion     [ ]Adequate   [ ]Inadequate  Mentation   [ ]Normal       [ ]Reduced  Extremities  [ ]Warm         [ ]Cool  Volume Status [ ]Hypervolemic [ ]Euvolemic [ ]Hypovolemic  Meds: esmolol  Infusion 50 MICROgram(s)/kG/Min IV Continuous <Continuous>  norepinephrine Infusion 0.04 MICROgram(s)/kG/Min IV Continuous <Continuous>  phenylephrine    Infusion 0.2 MICROgram(s)/kG/Min IV Continuous <Continuous>      GI/NUTRITION  Exam:   Diet:   Last Bowel Movement: 19-Nov-2024 (11-22-24 @ 19:00)  Last Bowel Movement: 22-Nov-2024 (11-22-24 @ 07:00)  Last Bowel Movement: 21-Nov-2024 (11-21-24 @ 19:00)  Last Bowel Movement: 19-Nov-2024 (11-20-24 @ 19:00)  Last Bowel Movement: 20-Nov-2024 (11-20-24 @ 07:00)  Last Bowel Movement: 20-Nov-2024 (11-19-24 @ 23:00)  Last Bowel Movement: 14-Nov-2024 (11-14-24 @ 20:30)    Meds: pantoprazole  Injectable 40 milliGRAM(s) IV Push daily  ursodiol Suspension 300 milliGRAM(s) Oral two times a day      GENITOURINARY  I&O's Detail    11-21 @ 07:01  -  11-22 @ 07:00  --------------------------------------------------------  IN:    Dexmedetomidine: 185.8 mL    Enteral Tube Flush: 350 mL    IV PiggyBack: 150 mL    IV PiggyBack: 800 mL    Norepinephrine: 8.8 mL    Norepinephrine: 19.3 mL    Propofol: 112 mL    sodium chloride 0.9%: 300 mL    sodium chloride 0.9% w/ Additives: 840 mL  Total IN: 2765.9 mL    OUT:    Bulb (mL): 390 mL    Bulb (mL): 290 mL    Indwelling Catheter - Urethral (mL): 2525 mL    Nasogastric/Oral tube (mL): 35 mL  Total OUT: 3240 mL    Total NET: -474.1 mL      11-22 @ 07:01  -  11-23 @ 03:56  --------------------------------------------------------  IN:    Dexmedetomidine: 187.4 mL    Enteral Tube Flush: 30 mL    Esmolol: 84.1 mL    IV PiggyBack: 225 mL    Phenylephrine: 101.5 mL    sodium chloride 0.9% w/ Additives: 1140 mL  Total IN: 1768 mL    OUT:    Bulb (mL): 400 mL    Bulb (mL): 270 mL    Indwelling Catheter - Urethral (mL): 2590 mL    Norepinephrine: 0 mL    Rectal Tube (mL): 80 mL  Total OUT: 3340 mL    Total NET: -1572 mL          11-23    141  |  107  |  58[H]  ----------------------------<  129[H]  3.3[L]   |  22  |  1.27    Ca    6.9[L]      23 Nov 2024 00:36  Phos  2.2     11-23  Mg     2.8     11-23    TPro  4.5[L]  /  Alb  2.7[L]  /  TBili  0.8  /  DBili  x   /  AST  24  /  ALT  44  /  AlkPhos  84  11-23    Meds: calcium carbonate 1250 mG  + Vitamin D (OsCal 500 + D) 1 Tablet(s) Oral two times a day  potassium chloride  10 mEq/100 mL IVPB 10 milliEquivalent(s) IV Intermittent every 1 hour  sodium chloride 0.9% 1000 milliLiter(s) IV Continuous <Continuous>      HEMATOLOGIC  Meds: heparin   Injectable 5000 Unit(s) SubCutaneous every 12 hours                          9.9    19.38 )-----------( 292      ( 23 Nov 2024 00:36 )             32.4     PT/INR - ( 23 Nov 2024 00:36 )   PT: 15.6 sec;   INR: 1.37 ratio         PTT - ( 23 Nov 2024 00:36 )  PTT:24.7 sec    INFECTIOUS DISEASES  T(C): 38.4 (11-22-24 @ 23:00), Max: 38.4 (11-22-24 @ 20:45)  Wt(kg): --  WBC Count: 19.38 K/uL (11-23 @ 00:36)  WBC Count: 13.66 K/uL (11-22 @ 05:38)    Recent Cultures:  Specimen Source: .Blood BLOOD, 11-20 @ 11:53; Results   No growth at 48 Hours; Gram Stain: --; Organism: --  Specimen Source: .Blood BLOOD, 11-20 @ 11:43; Results   No growth at 48 Hours; Gram Stain: --; Organism: --    Meds: fluconAZOLE IVPB 400 milliGRAM(s) IV Intermittent every 24 hours  meropenem  IVPB 1000 milliGRAM(s) IV Intermittent every 8 hours  trimethoprim  40 mG/sulfamethoxazole 200 mG Suspension 80 milliGRAM(s) Enteral Tube daily  valGANciclovir 50 mG/mL Oral Solution 450 milliGRAM(s) Oral daily      ENDOCRINE  Capillary Blood Glucose    Meds: insulin lispro (ADMELOG) corrective regimen sliding scale   SubCutaneous every 6 hours  insulin NPH human recombinant 9 Unit(s) SubCutaneous every 6 hours  predniSONE   Tablet 20 milliGRAM(s) Oral daily      ACCESS DEVICES:  [ ] Peripheral IV  [ ] Central Venous Line		[ ] R	[ ] L	[ ] IJ	[ ] Fem	[ ] SC	Placed:   [ ] Arterial Line			[ ] R	[ ] L	[ ] Fem	[ ] Rad	[ ] Ax	Placed:   [ ] PICC:					[ ] Mediport  [ ] Urinary Catheter, Date Placed:   [ ] Necessity of urinary, arterial, and venous catheters discussed    OTHER MEDICATIONS:  chlorhexidine 0.12% Liquid 15 milliLiter(s) Oral Mucosa every 12 hours  chlorhexidine 2% Cloths 1 Application(s) Topical <User Schedule>      IMAGING: Detail Level: Detailed

## 2024-11-23 NOTE — PROGRESS NOTE ADULT - SUBJECTIVE AND OBJECTIVE BOX
Transplant Surgery - Multidisciplinary Progress Note   --------------------------------------------------------------  OLT   11/15/2024        POD#8    Present: Patient seen and examined with multidisciplinary Transplant team including (Surgery: Dr. Hutchinson. Hepatology: JAZZ Murray,  SICU team in AM rounds. Disciplines not in attendance will be notified of the plan.     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    Interval Events:              Immunosuppression:  -FK per level, MMF 1/1, SST  -ongoing monitoring for signs of rejection  Potential Discharge date: Pending clinical course                                   Review of systems  All other systems were reviewed and are negative, except as noted.    PHYSICAL EXAM:  Constitutional: Well developed / well nourished  Eyes:  PERRLA  ENMT: nc/at, no thrush  Neck: supple   Respiratory: CTA B/L, intubated  Cardiovascular: RRR  Gastrointestinal: +Distended abdomen, appropriate incisional TTP. chevron incision c/d/i, staples in place. No signs of infection.  JPx3 ss  Genitourinary: Voiding spontaneously   Extremities: SCD's in place and working bilaterally  Neurological: A&O x0, intubated, sedated  Skin: no rashes, ulcerations, lesions  Psychiatric: Responsive Transplant Surgery - Multidisciplinary Progress Note   --------------------------------------------------------------  OLT   11/15/2024        POD#8    Present: Patient seen and examined with multidisciplinary Transplant team including (Surgery: Dr. Hutchinson. Hepatology: JAZZ Murray,  SICU team in AM rounds. Disciplines not in attendance will be notified of the plan.     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    Interval Events:  TMax 101 o/n, now on Meropenem  AFib improving after Esmolol gtt and Digoxin o/n  Intubated, sedated with Precedex. Stable vents settings  AXR with dilated bowel, rectal tube placed (80)  diuresing well with lasix challenge    Immunosuppression:  -FK has been on HOLD, MMF 1/1, SST  -ongoing monitoring for signs of rejection  Potential Discharge date: Pending clinical course         MEDICATIONS  (STANDING):  albuterol/ipratropium for Nebulization 3 milliLiter(s) Nebulizer every 6 hours  aspirin Suppository 300 milliGRAM(s) Rectal daily  buDESOnide    Inhalation Suspension 0.5 milliGRAM(s) Inhalation two times a day  calcium carbonate 1250 mG  + Vitamin D (OsCal 500 + D) 1 Tablet(s) Oral two times a day  chlorhexidine 0.12% Liquid 15 milliLiter(s) Oral Mucosa every 12 hours  chlorhexidine 2% Cloths 1 Application(s) Topical <User Schedule>  dexMEDEtomidine Infusion 1.5 MICROgram(s)/kG/Hr (35.1 mL/Hr) IV Continuous <Continuous>  esmolol  Infusion 50 MICROgram(s)/kG/Min (28.1 mL/Hr) IV Continuous <Continuous>  fluconAZOLE IVPB 400 milliGRAM(s) IV Intermittent every 24 hours  heparin   Injectable 5000 Unit(s) SubCutaneous every 12 hours  insulin lispro (ADMELOG) corrective regimen sliding scale   SubCutaneous every 6 hours  insulin NPH human recombinant 9 Unit(s) SubCutaneous every 6 hours  levETIRAcetam  IVPB 500 milliGRAM(s) IV Intermittent every 12 hours  LORazepam   Injectable 1 milliGRAM(s) IV Push every 8 hours  meropenem  IVPB 1000 milliGRAM(s) IV Intermittent every 8 hours  metoprolol tartrate Injectable 5 milliGRAM(s) IV Push every 4 hours  norepinephrine Infusion 0.04 MICROgram(s)/kG/Min (7.02 mL/Hr) IV Continuous <Continuous>  pantoprazole  Injectable 40 milliGRAM(s) IV Push daily  phenylephrine    Infusion 0.2 MICROgram(s)/kG/Min (7.02 mL/Hr) IV Continuous <Continuous>  predniSONE   Tablet 20 milliGRAM(s) Oral daily  sodium chloride 0.9% 1000 milliLiter(s) (30 mL/Hr) IV Continuous <Continuous>  sodium chloride 3%  Inhalation 4 milliLiter(s) Inhalation every 6 hours  trimethoprim  40 mG/sulfamethoxazole 200 mG Suspension 80 milliGRAM(s) Enteral Tube daily  ursodiol Suspension 300 milliGRAM(s) Oral two times a day  valGANciclovir 50 mG/mL Oral Solution 450 milliGRAM(s) Oral daily    MEDICATIONS  (PRN):  HYDROmorphone  Injectable 1 milliGRAM(s) IV Push every 3 hours PRN Breakthrough Pain      PAST MEDICAL & SURGICAL HISTORY:  Diabetes      Transaminitis      Paroxysmal atrial fibrillation      Depression      BPH (benign prostatic hyperplasia)      Hypertension      Chronic atrial fibrillation      Coronary artery disease      Hepatocellular carcinoma      DM (diabetes mellitus)      HTN (hypertension)      Paroxysmal atrial fibrillation      Cirrhosis      HCC (hepatocellular carcinoma)      History of BPH      History of laparoscopic cholecystectomy      History of lumbar laminectomy      H/O prior ablation treatment      H/O percutaneous left heart catheterization          Vital Signs Last 24 Hrs  T(C): 37.7 (23 Nov 2024 05:00), Max: 38.4 (22 Nov 2024 20:45)  T(F): 99.9 (23 Nov 2024 05:00), Max: 101.1 (22 Nov 2024 20:45)  HR: 87 (23 Nov 2024 07:15) (72 - 137)  BP: 88/54 (23 Nov 2024 07:00) (72/50 - 128/66)  BP(mean): 68 (23 Nov 2024 07:00) (54 - 88)  RR: 19 (23 Nov 2024 07:15) (15 - 26)  SpO2: 100% (23 Nov 2024 07:15) (93% - 100%)    Parameters below as of 23 Nov 2024 04:59  Patient On (Oxygen Delivery Method): ventilator        I&O's Summary    22 Nov 2024 07:01  -  23 Nov 2024 07:00  --------------------------------------------------------  IN: 2711.9 mL / OUT: 3760 mL / NET: -1048.1 mL                              10.1   16.19 )-----------( 225      ( 23 Nov 2024 06:20 )             32.7     11-23    142  |  110[H]  |  51[H]  ----------------------------<  134[H]  3.8   |  20[L]  |  1.08    Ca    7.0[L]      23 Nov 2024 06:18  Phos  2.2     11-23  Mg     3.0     11-23    TPro  4.2[L]  /  Alb  2.5[L]  /  TBili  0.7  /  DBili  x   /  AST  24  /  ALT  41  /  AlkPhos  77  11-23    Tacrolimus (), Serum: <0.8 ng/mL (11-22 @ 05:38)        Culture - Blood (collected 11-20-24 @ 11:53)  Source: .Blood BLOOD  Preliminary Report (11-22-24 @ 16:15):    No growth at 48 Hours    Culture - Blood (collected 11-20-24 @ 11:43)  Source: .Blood BLOOD  Preliminary Report (11-22-24 @ 16:15):    No growth at 48 Hours                                        Review of systems  All other systems were reviewed and are negative, except as noted.    PHYSICAL EXAM:  Constitutional: Well developed / well nourished  Eyes:  PERRLA  ENMT: nc/at, no thrush  Neck: supple   Respiratory: CTA B/L, intubated  Cardiovascular: RRR  Gastrointestinal: +Distended abdomen, appropriate incisional TTP. chevron incision c/d/i, staples in place. No signs of infection.  ALYSSA#2,#3 ss  Genitourinary: Voiding via nichols  Extremities: SCD's in place and working bilaterally  Neurological: A&O x0, intubated, sedated  Skin: no rashes, ulcerations, lesions  Psychiatric: Responsive Transplant Surgery - Multidisciplinary Progress Note   --------------------------------------------------------------  OLT   11/15/2024        POD#8    Present: Patient seen and examined with multidisciplinary Transplant team including (Surgery: Dr. Hutchinson. Hepatology: JAZZ Murray,  SICU team in AM rounds. Disciplines not in attendance will be notified of the plan.     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    Interval Events:  TMax 101 o/n, now on Meropenem  AFib improving after Esmolol gtt and Digoxin o/n  Intubated, sedated with Precedex. Stable vents settings  AXR with dilated bowel, rectal tube placed (80)  diuresing well with lasix challenge    Immunosuppression:  -FK has been on HOLD, MMF 1/1, SST  -ongoing monitoring for signs of rejection  Potential Discharge date: Pending clinical course         MEDICATIONS  (STANDING):  albuterol/ipratropium for Nebulization 3 milliLiter(s) Nebulizer every 6 hours  aspirin Suppository 300 milliGRAM(s) Rectal daily  buDESOnide    Inhalation Suspension 0.5 milliGRAM(s) Inhalation two times a day  calcium carbonate 1250 mG  + Vitamin D (OsCal 500 + D) 1 Tablet(s) Oral two times a day  chlorhexidine 0.12% Liquid 15 milliLiter(s) Oral Mucosa every 12 hours  chlorhexidine 2% Cloths 1 Application(s) Topical <User Schedule>  dexMEDEtomidine Infusion 1.5 MICROgram(s)/kG/Hr (35.1 mL/Hr) IV Continuous <Continuous>  esmolol  Infusion 50 MICROgram(s)/kG/Min (28.1 mL/Hr) IV Continuous <Continuous>  fluconAZOLE IVPB 400 milliGRAM(s) IV Intermittent every 24 hours  heparin   Injectable 5000 Unit(s) SubCutaneous every 12 hours  insulin lispro (ADMELOG) corrective regimen sliding scale   SubCutaneous every 6 hours  insulin NPH human recombinant 9 Unit(s) SubCutaneous every 6 hours  levETIRAcetam  IVPB 500 milliGRAM(s) IV Intermittent every 12 hours  LORazepam   Injectable 1 milliGRAM(s) IV Push every 8 hours  meropenem  IVPB 1000 milliGRAM(s) IV Intermittent every 8 hours  metoprolol tartrate Injectable 5 milliGRAM(s) IV Push every 4 hours  norepinephrine Infusion 0.04 MICROgram(s)/kG/Min (7.02 mL/Hr) IV Continuous <Continuous>  pantoprazole  Injectable 40 milliGRAM(s) IV Push daily  phenylephrine    Infusion 0.2 MICROgram(s)/kG/Min (7.02 mL/Hr) IV Continuous <Continuous>  predniSONE   Tablet 20 milliGRAM(s) Oral daily  sodium chloride 0.9% 1000 milliLiter(s) (30 mL/Hr) IV Continuous <Continuous>  sodium chloride 3%  Inhalation 4 milliLiter(s) Inhalation every 6 hours  trimethoprim  40 mG/sulfamethoxazole 200 mG Suspension 80 milliGRAM(s) Enteral Tube daily  ursodiol Suspension 300 milliGRAM(s) Oral two times a day  valGANciclovir 50 mG/mL Oral Solution 450 milliGRAM(s) Oral daily    MEDICATIONS  (PRN):  HYDROmorphone  Injectable 1 milliGRAM(s) IV Push every 3 hours PRN Breakthrough Pain      PAST MEDICAL & SURGICAL HISTORY:  Diabetes      Transaminitis      Paroxysmal atrial fibrillation      Depression      BPH (benign prostatic hyperplasia)      Hypertension      Chronic atrial fibrillation      Coronary artery disease      Hepatocellular carcinoma      DM (diabetes mellitus)      HTN (hypertension)      Paroxysmal atrial fibrillation      Cirrhosis      HCC (hepatocellular carcinoma)      History of BPH      History of laparoscopic cholecystectomy      History of lumbar laminectomy      H/O prior ablation treatment      H/O percutaneous left heart catheterization          Vital Signs Last 24 Hrs  T(C): 37.7 (23 Nov 2024 05:00), Max: 38.4 (22 Nov 2024 20:45)  T(F): 99.9 (23 Nov 2024 05:00), Max: 101.1 (22 Nov 2024 20:45)  HR: 87 (23 Nov 2024 07:15) (72 - 137)  BP: 88/54 (23 Nov 2024 07:00) (72/50 - 128/66)  BP(mean): 68 (23 Nov 2024 07:00) (54 - 88)  RR: 19 (23 Nov 2024 07:15) (15 - 26)  SpO2: 100% (23 Nov 2024 07:15) (93% - 100%)    Parameters below as of 23 Nov 2024 04:59  Patient On (Oxygen Delivery Method): ventilator        I&O's Summary    22 Nov 2024 07:01  -  23 Nov 2024 07:00  --------------------------------------------------------  IN: 2711.9 mL / OUT: 3760 mL / NET: -1048.1 mL                              10.1   16.19 )-----------( 225      ( 23 Nov 2024 06:20 )             32.7     11-23    142  |  110[H]  |  51[H]  ----------------------------<  134[H]  3.8   |  20[L]  |  1.08    Ca    7.0[L]      23 Nov 2024 06:18  Phos  2.2     11-23  Mg     3.0     11-23    TPro  4.2[L]  /  Alb  2.5[L]  /  TBili  0.7  /  DBili  x   /  AST  24  /  ALT  41  /  AlkPhos  77  11-23    Tacrolimus (), Serum: <0.8 ng/mL (11-22 @ 05:38)        Culture - Blood (collected 11-20-24 @ 11:53)  Source: .Blood BLOOD  Preliminary Report (11-22-24 @ 16:15):    No growth at 48 Hours    Culture - Blood (collected 11-20-24 @ 11:43)  Source: .Blood BLOOD  Preliminary Report (11-22-24 @ 16:15):    No growth at 48 Hours                                        Review of systems  All other systems were reviewed and are negative, except as noted.    PHYSICAL EXAM:  Constitutional: Well developed / well nourished  Eyes:  PERRLA  ENMT: nc/at, no thrush  Neck: supple   Respiratory: CTA B/L, intubated  Cardiovascular: RRR  Gastrointestinal: +Distended abdomen though improving, appropriate incisional TTP. chevron incision c/d/i, staples in place. No signs of infection.  ALYSSA#2,#3 ss  Genitourinary: Voiding via nichols, rectal tube in place--yellow mucous  Extremities: SCD's in place and working bilaterally  Neurological: A&O x0, intubated, sedated  Skin: no rashes, ulcerations, lesions  Psychiatric: Responsive

## 2024-11-24 NOTE — PROGRESS NOTE ADULT - SUBJECTIVE AND OBJECTIVE BOX
HPI:  Patient seen and examined at bedside in SICU.  Remains intubate and sedated.  Rate controlled atrial fibrillation.     Review Of Systems:   Unable to obtain        Medications:  albuterol/ipratropium for Nebulization 3 milliLiter(s) Nebulizer every 6 hours  buDESOnide    Inhalation Suspension 0.5 milliGRAM(s) Inhalation two times a day  calcium carbonate 1250 mG  + Vitamin D (OsCal 500 + D) 1 Tablet(s) Oral every 12 hours  calcium gluconate IVPB 2 Gram(s) IV Intermittent once  chlorhexidine 0.12% Liquid 15 milliLiter(s) Oral Mucosa every 12 hours  chlorhexidine 2% Cloths 1 Application(s) Topical <User Schedule>  cycloSPORINE  , modified (GENGRAF) Solution 50 milliGRAM(s) Enteral Tube <User Schedule>  finasteride 5 milliGRAM(s) Oral daily  fluconAZOLE IVPB 400 milliGRAM(s) IV Intermittent every 24 hours  furosemide   Injectable 40 milliGRAM(s) IV Push once  heparin   Injectable 5000 Unit(s) SubCutaneous every 12 hours  HYDROmorphone  Injectable 1 milliGRAM(s) IV Push every 3 hours PRN  insulin lispro (ADMELOG) corrective regimen sliding scale   SubCutaneous every 6 hours  insulin NPH human recombinant 9 Unit(s) SubCutaneous every 6 hours  levETIRAcetam  IVPB 500 milliGRAM(s) IV Intermittent every 12 hours  linezolid  IVPB      linezolid  IVPB 600 milliGRAM(s) IV Intermittent every 12 hours  LORazepam   Injectable 1 milliGRAM(s) IV Push every 12 hours  meropenem  IVPB 1000 milliGRAM(s) IV Intermittent every 8 hours  metoprolol tartrate 12.5 milliGRAM(s) Enteral Tube every 6 hours  multiple electrolytes Injection Type 1 1000 milliLiter(s) IV Continuous <Continuous>  pantoprazole  Injectable 40 milliGRAM(s) IV Push daily  propofol Infusion 5 MICROgram(s)/kG/Min IV Continuous <Continuous>  sodium chloride 3%  Inhalation 4 milliLiter(s) Inhalation every 6 hours  ursodiol Suspension 300 milliGRAM(s) Oral every 12 hours    PAST MEDICAL & SURGICAL HISTORY:  Diabetes      Transaminitis      Paroxysmal atrial fibrillation      Depression      BPH (benign prostatic hyperplasia)      Hypertension      Chronic atrial fibrillation      Coronary artery disease      Hepatocellular carcinoma      DM (diabetes mellitus)      HTN (hypertension)      Paroxysmal atrial fibrillation      Cirrhosis      HCC (hepatocellular carcinoma)      History of BPH      History of laparoscopic cholecystectomy      History of lumbar laminectomy      H/O prior ablation treatment      H/O percutaneous left heart catheterization        Vitals:  T(C): 37 (11-24-24 @ 18:00), Max: 97.9 (11-24-24 @ 15:00)  HR: 121 (11-24-24 @ 18:00) (68 - 152)  BP: 158/69 (11-24-24 @ 17:30) (75/50 - 158/69)  BP(mean): 99 (11-24-24 @ 17:30) (56 - 108)  RR: 19 (11-24-24 @ 18:00) (9 - 40)  SpO2: 100% (11-24-24 @ 18:00) (90% - 100%)  Wt(kg): --  Daily     Daily   I&O's Summary    23 Nov 2024 07:01  -  24 Nov 2024 07:00  --------------------------------------------------------  IN: 3256.6 mL / OUT: 4210 mL / NET: -953.4 mL    24 Nov 2024 07:01  -  24 Nov 2024 18:04  --------------------------------------------------------  IN: 1317.7 mL / OUT: 860 mL / NET: 457.7 mL        Physical Exam:  Appearance: Intubated, sedated  Eyes: PERRL, EOMI, pink conjunctiva  HENT: +ET tube  Cardiovascular: irregular  Respiratory: Clear to auscultation bilaterally  Gastrointestinal: soft, non-tender, non-distended with normal bowel sounds  Musculoskeletal: No clubbing; no joint deformity                           11.2   14.41 )-----------( 209      ( 24 Nov 2024 00:22 )             37.4     11-24    143  |  111[H]  |  39[H]  ----------------------------<  135[H]  3.7   |  23  |  1.20    Ca    7.3[L]      24 Nov 2024 00:22  Phos  2.5     11-24  Mg     2.4     11-24    TPro  4.4[L]  /  Alb  2.7[L]  /  TBili  0.6  /  DBili  x   /  AST  29  /  ALT  42  /  AlkPhos  73  11-24    PT/INR - ( 24 Nov 2024 00:22 )   PT: 15.6 sec;   INR: 1.36 ratio         PTT - ( 24 Nov 2024 00:22 )  PTT:24.6 sec      Cardiovascular Diagnostic Testing:  ECG: sinus rhythm    Echo: < from: Intra-Operative Transesophageal Echo W or WO Ultrasound Enhancing Agent (11.15.24 @ 07:46) >  --------------------------------------------------------------------------------  PRE-BYPASS FINDINGS     Left Ventricle:  Left ventricular ejection fraction is estimated at 60 to 65%. Normal left ventricularwall thickness. The left ventricular systolic function is normal There are no regional wall motion abnormalities seen.     Right Ventricle:  The right ventricular cavity is normal in size, with normal wall thickness and right ventricular systolic function is normal. Right ventricular systolic function is normal.     Left Atrium:  The left atrium is normal in size. No thrombus visualized in left atrial appendage.     Right Atrium:  The right atrium is normal in size. The right atrium is normal in size.     Interatrial Septum:  No PFO visualized with color flow doppler.     Aortic Valve:  The aortic valve appears trileaflet. There is no aortic valve stenosis. There is trace aortic regurgitation.     Mitral Valve:  There is no mitral valve stenosis. There is no mitral valve stenosis. There is trace mitral regurgitation.     Tricuspid Valve:  There is no evidence of tricuspid stenosis. There is trace tricuspid regurgitation.     Pericardium:  No pericardial effusion seen.     Post-Bypass:  S/P OLT. Under current loading conditions, hyperdynamic left ventricular systolic function, EF: 70-75% by visual estimate, no RWMA. Normal right ventricular systolic function. All other findings unchanged. Probe removed atraumatically, no blood. The patient tolerated the procedure well and without complications. Permanent recorded images are stored in the medical record.     Electronically signed by James Villareal on 11/15/2024 at 2:18:16 PM         *** Final ***    < end of copied text >      Stress Testing:     Cath: 4/24/2024, patient had his LHC repeated with Ben Villareal MD at [facility].  Cath showed multivessel coronary artery disease, but the lesions previously noted were FFR negative.  He continues to have MIRTA 3 flow throughout.    Interpretation of Telemetry: AFib with RVR    Imaging:

## 2024-11-24 NOTE — PROGRESS NOTE ADULT - ASSESSMENT
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 No reported focal neurologic deficits now or prior, so doubt cerebrovascular event. Could also be related to toxicity from immunosuppressant medications but less likely. CT head, personally reviewed by me, with small vessel ischemic changes and atrophy but no acute intracranial findings. 11/21 - Mentation improving as sedation decreased. No further seizures noted. Some slight L > R UE weakness but unclear if significant or not. 11/24 - Mentation continues to improve. Patient reportedly tachycardic and febrile. Could be due to infectious process versus benzodiazepine withdrawal (initially on taper recommended by me but this changed/accelerated by SICU team due to unclear reasons)  - vEEG with mild generalized slowing but no evidence for focal slowing, epileptiform discharges, or seizures. STOPPED  - Had initially recommended Lorazepam 3mg IV [id number] and dec by 0.5mg IV [id number] every two days until OFF. However, team is doing lower benzodiazepine taper schedule of their own at this time  - No other ASM's needed from neurologic standpoint at this time (Keppra being given to prevent cerebral edema [?] as per SICU/transplant team, unfamiliar with this)  - Minimize sedation with propofol and barbiturates (Precedex, fentanyl, hydromorphone OK)  - Await labs for additional causes with folate, B1, B6, WNV  - Vitamin D levels low, would replete with D2 89066 Units PO weekly for 8 weeks and then recheck new levels  - No indication for MRI brain or LP at this time  - Above discussed with SICU team, who verbalized agreement and understanding  - Continue to address above medical and surgical issues, as you are doing  - Will sign off, please call [phone]) with additional questions or concerns

## 2024-11-24 NOTE — PROGRESS NOTE ADULT - SUBJECTIVE AND OBJECTIVE BOX
24 HOUR EVENTS:        NEURO  RASS (if intubated): 		CAM ICU (if concern for delirium):  Exam:   Meds: dexMEDEtomidine Infusion 1.5 MICROgram(s)/kG/Hr IV Continuous <Continuous>  levETIRAcetam  IVPB 500 milliGRAM(s) IV Intermittent every 12 hours  LORazepam   Injectable 1 milliGRAM(s) IV Push every 12 hours      RESPIRATORY  RR: 21 (11-24-24 @ 01:00) (13 - 26)  SpO2: 100% (11-24-24 @ 01:00) (97% - 100%)  Wt(kg): --  Exam:  Mechanical Ventilation: Mode: AC/ CMV (Assist Control/ Continuous Mandatory Ventilation), RR (machine): 14, RR (patient): 19, TV (machine): 450, FiO2: 40, PEEP: 5, ITime: 0.9, MAP: 7.1, PIP: 13  ABG - ( 23 Nov 2024 11:07 )  pH: 7.54  /  pCO2: 26    /  pO2: 107   / HCO3: 22    / Base Excess: 0.5   /  SaO2: 99.2    Lactate: x                Meds: buDESOnide    Inhalation Suspension 0.5 milliGRAM(s) Inhalation two times a day      CARDIOVASCULAR  HR: 97 (11-24-24 @ 01:00) (72 - 112)  BP: 81/54 (11-24-24 @ 01:00) (81/54 - 119/59)  BP(mean): 63 (11-24-24 @ 01:00) (54 - 93)  ABP: --  ABP(mean): --  Wt(kg): --  CVP(cm H2O): --  VBG - ( 24 Nov 2024 00:10 )  pH: 7.41  /  pCO2: 44    /  pO2: 39    / HCO3: 28    / Base Excess: 2.8   /  SaO2: 58.6   Lactate: 1.7                Exam:   Cardiac Rhythm:   Perfusion     [ ]Adequate   [ ]Inadequate  Mentation   [ ]Normal       [ ]Reduced  Extremities  [ ]Warm         [ ]Cool  Volume Status [ ]Hypervolemic [ ]Euvolemic [ ]Hypovolemic  Meds: digoxin  Injectable 125 MICROGram(s) IV Push once  metoprolol tartrate Injectable 5 milliGRAM(s) IV Push every 4 hours  phenylephrine    Infusion 0.2 MICROgram(s)/kG/Min IV Continuous <Continuous>      GI/NUTRITION  Exam:   Diet:   Meds: pantoprazole  Injectable 40 milliGRAM(s) IV Push daily  ursodiol Suspension 300 milliGRAM(s) Oral two times a day      GENITOURINARY  I&O's Jackie    11-22 @ 07:01 - 11-23 @ 07:00  --------------------------------------------------------  IN:    Dexmedetomidine: 241.3 mL    Enteral Tube Flush: 230 mL    Esmolol: 84.1 mL    IV PiggyBack: 525 mL    IV PiggyBack: 100 mL    IV PiggyBack: 100 mL    Phenylephrine: 171.5 mL    sodium chloride 0.9% w/ Additives: 1260 mL  Total IN: 2711.9 mL    OUT:    Bulb (mL): 320 mL    Bulb (mL): 480 mL    Indwelling Catheter - Urethral (mL): 2880 mL    Norepinephrine: 0 mL    Rectal Tube (mL): 80 mL  Total OUT: 3760 mL    Total NET: -1048.1 mL      11-23 @ 07:01 - 11-24 @ 01:20  --------------------------------------------------------  IN:    Dexmedetomidine: 185.5 mL    Enteral Tube Flush: 100 mL    IV PiggyBack: 499.8 mL    IV PiggyBack: 300 mL    IV PiggyBack: 800 mL    multiple electrolytes Injection Type 1.: 150 mL    Phenylephrine: 147.4 mL    sodium chloride 0.9% w/ Additives: 300 mL  Total IN: 2482.7 mL    OUT:    Bulb (mL): 330 mL    Bulb (mL): 370 mL    Esmolol: 0 mL    Indwelling Catheter - Urethral (mL): 2705 mL    Nasogastric/Oral tube (mL): 100 mL    Norepinephrine: 0 mL    Rectal Tube (mL): 100 mL  Total OUT: 3605 mL    Total NET: -1122.3 mL          11-24    143  |  111[H]  |  39[H]  ----------------------------<  135[H]  3.7   |  23  |  1.20    Ca    7.3[L]      24 Nov 2024 00:22  Phos  2.5     11-24  Mg     2.4     11-24    TPro  4.4[L]  /  Alb  2.7[L]  /  TBili  0.6  /  DBili  x   /  AST  29  /  ALT  42  /  AlkPhos  73  11-24    Meds: calcium carbonate 1250 mG  + Vitamin D (OsCal 500 + D) 1 Tablet(s) Oral two times a day  multiple electrolytes Injection Type 1 1000 milliLiter(s) IV Continuous <Continuous>      HEMATOLOGIC  Meds: aspirin  chewable 81 milliGRAM(s) Enteral Tube daily  heparin   Injectable 5000 Unit(s) SubCutaneous every 12 hours                          11.2   14.41 )-----------( 209      ( 24 Nov 2024 00:22 )             37.4     PT/INR - ( 24 Nov 2024 00:22 )   PT: 15.6 sec;   INR: 1.36 ratio         PTT - ( 24 Nov 2024 00:22 )  PTT:24.6 sec    INFECTIOUS DISEASES  T(C): 38.6 (11-23-24 @ 23:00), Max: 38.6 (11-23-24 @ 19:00)  Wt(kg): --  WBC Count: 14.41 K/uL (11-24 @ 00:22)  WBC Count: 14.70 K/uL (11-23 @ 11:19)  WBC Count: 16.19 K/uL (11-23 @ 06:20)    Recent Cultures:  Specimen Source: Combi-Cath, 11-23 @ 13:09; Results --; Gram Stain:   Moderate polymorphonuclear leukocytes per low power field  No squamous epithelial cells per low power field  No organisms seen; Organism: --  Specimen Source: .Blood BLOOD, 11-22 @ 17:47; Results   No growth at 24 hours; Gram Stain: --; Organism: --  Specimen Source: .Blood BLOOD, 11-20 @ 11:53; Results   No growth at 72 Hours; Gram Stain: --; Organism: --  Specimen Source: .Blood BLOOD, 11-20 @ 11:43; Results   No growth at 72 Hours; Gram Stain: --; Organism: --    Meds: fluconAZOLE IVPB 400 milliGRAM(s) IV Intermittent every 24 hours  linezolid  IVPB      linezolid  IVPB 600 milliGRAM(s) IV Intermittent every 12 hours  meropenem  IVPB 1000 milliGRAM(s) IV Intermittent every 8 hours  trimethoprim  40 mG/sulfamethoxazole 200 mG Suspension 80 milliGRAM(s) Enteral Tube daily  valGANciclovir 50 mG/mL Oral Solution 450 milliGRAM(s) Oral daily      ENDOCRINE  Capillary Blood Glucose    Meds: insulin lispro (ADMELOG) corrective regimen sliding scale   SubCutaneous every 6 hours  insulin NPH human recombinant 9 Unit(s) SubCutaneous every 6 hours  predniSONE   Tablet 20 milliGRAM(s) Oral daily      ACCESS DEVICES:  [ ] Peripheral IV  [ ] Central Venous Line		[ ] R	[ ] L	[ ] IJ	[ ] Fem	[ ] SC	Placed:   [ ] Arterial Line			[ ] R	[ ] L	[ ] Fem	[ ] Rad	[ ] Ax	Placed:   [ ] PICC:					[ ] Mediport  [ ] Urinary Catheter, Date Placed:   [ ] Necessity of urinary, arterial, and venous catheters discussed    OTHER MEDICATIONS:  chlorhexidine 0.12% Liquid 15 milliLiter(s) Oral Mucosa every 12 hours  chlorhexidine 2% Cloths 1 Application(s) Topical <User Schedule>      IMAGING:

## 2024-11-24 NOTE — PROGRESS NOTE ADULT - SUBJECTIVE AND OBJECTIVE BOX
AF 90s-100    MEDICATIONS:  aspirin  chewable 81 milliGRAM(s) Enteral Tube daily  heparin   Injectable 5000 Unit(s) SubCutaneous every 12 hours  metoprolol tartrate 12.5 milliGRAM(s) Enteral Tube every 6 hours    fluconAZOLE IVPB 400 milliGRAM(s) IV Intermittent every 24 hours  linezolid  IVPB      linezolid  IVPB 600 milliGRAM(s) IV Intermittent every 12 hours  meropenem  IVPB 1000 milliGRAM(s) IV Intermittent every 8 hours  trimethoprim  40 mG/sulfamethoxazole 200 mG Suspension 80 milliGRAM(s) Enteral Tube daily  valGANciclovir 50 mG/mL Oral Solution 450 milliGRAM(s) Oral daily    albuterol/ipratropium for Nebulization 3 milliLiter(s) Nebulizer every 6 hours  buDESOnide    Inhalation Suspension 0.5 milliGRAM(s) Inhalation two times a day  sodium chloride 3%  Inhalation 4 milliLiter(s) Inhalation every 6 hours    dexMEDEtomidine Infusion 1.5 MICROgram(s)/kG/Hr IV Continuous <Continuous>  HYDROmorphone  Injectable 1 milliGRAM(s) IV Push every 3 hours PRN  levETIRAcetam  IVPB 500 milliGRAM(s) IV Intermittent every 12 hours  LORazepam   Injectable 1 milliGRAM(s) IV Push every 12 hours  propofol Infusion 10 MICROgram(s)/kG/Min IV Continuous <Continuous>    pantoprazole  Injectable 40 milliGRAM(s) IV Push daily  ursodiol Suspension 300 milliGRAM(s) Oral two times a day    finasteride 5 milliGRAM(s) Oral daily  insulin lispro (ADMELOG) corrective regimen sliding scale   SubCutaneous every 6 hours  insulin NPH human recombinant 9 Unit(s) SubCutaneous every 6 hours  predniSONE   Tablet 20 milliGRAM(s) Oral daily    calcium carbonate 1250 mG  + Vitamin D (OsCal 500 + D) 1 Tablet(s) Oral two times a day  chlorhexidine 0.12% Liquid 15 milliLiter(s) Oral Mucosa every 12 hours  chlorhexidine 2% Cloths 1 Application(s) Topical <User Schedule>  cycloSPORINE  , modified (GENGRAF) Solution 50 milliGRAM(s) Enteral Tube <User Schedule>  multiple electrolytes Injection Type 1 1000 milliLiter(s) IV Continuous <Continuous>      REVIEW OF SYSTEMS:  Complete 10point ROS negative.    PHYSICAL EXAM:  T(C): 39.1 (11-24-24 @ 12:45), Max: 39.1 (11-24-24 @ 12:45)  HR: 77 (11-24-24 @ 14:15) (74 - 146)  BP: 87/49 (11-24-24 @ 14:00) (81/54 - 139/65)  RR: 20 (11-24-24 @ 14:15) (13 - 31)  SpO2: 100% (11-24-24 @ 14:15) (94% - 100%)  Wt(kg): --  I&O's Summary    23 Nov 2024 07:01  -  24 Nov 2024 07:00  --------------------------------------------------------  IN: 3256.6 mL / OUT: 4210 mL / NET: -953.4 mL    24 Nov 2024 07:01  -  24 Nov 2024 14:20  --------------------------------------------------------  IN: 642.1 mL / OUT: 360 mL / NET: 282.1 mL        Appearance: Normal	  HEENT:   +  ETT	  Lymphatic: No lymphadenopathy  Cardiovascular: irregularly irregular S1 S2, No JVD, No murmurs, No edema  Respiratory: Lungs clear to auscultation	  Psychiatry: A & O x 3, Mood & affect appropriate  Gastrointestinal:  Soft, Non-tender, + BS	  Skin: No rashes, No ecchymoses, No cyanosis	  Neurologic: Non-focal  Extremities: Normal range of motion, No clubbing, cyanosis or edema  Vascular: Peripheral pulses palpable 2+ bilaterally        LABS:	 	    CBC Full  -  ( 24 Nov 2024 00:22 )  WBC Count : 14.41 K/uL  Hemoglobin : 11.2 g/dL  Hematocrit : 37.4 %  Platelet Count - Automated : 209 K/uL  Mean Cell Volume : 85.2 fl  Mean Cell Hemoglobin : 25.5 pg  Mean Cell Hemoglobin Concentration : 29.9 g/dL  Auto Neutrophil # : x  Auto Lymphocyte # : x  Auto Monocyte # : x  Auto Eosinophil # : x  Auto Basophil # : x  Auto Neutrophil % : x  Auto Lymphocyte % : x  Auto Monocyte % : x  Auto Eosinophil % : x  Auto Basophil % : x    11-24    143  |  111[H]  |  39[H]  ----------------------------<  135[H]  3.7   |  23  |  1.20  11-23    143  |  113[H]  |  44[H]  ----------------------------<  156[H]  4.2   |  21[L]  |  0.91    Ca    7.3[L]      24 Nov 2024 00:22  Ca    8.1[L]      23 Nov 2024 11:20  Phos  2.5     11-24  Phos  2.7     11-23  Mg     2.4     11-24  Mg     2.8     11-23    TPro  4.4[L]  /  Alb  2.7[L]  /  TBili  0.6  /  DBili  x   /  AST  29  /  ALT  42  /  AlkPhos  73  11-24  TPro  4.5[L]  /  Alb  2.6[L]  /  TBili  0.6  /  DBili  x   /  AST  27  /  ALT  44  /  AlkPhos  76  11-23    	  ASSESSMENT/PLAN: 	  74 year old male, 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 Pt underwent Y90 Sept, 2023 with favorable treatment response. Pt completed OLTx evaluation and listed for OLTx 5/03/2024. Now s/p OLT on 11/15/24. EP consulted for post-op Afib with RVR. Rates better off levo (on phenyphrine), post dig load, and on metoprolol ATC.     Plan  - digoxin .125 mg IV daily  - continue metoprolol  - once "cleared" for anticoagulation can revisit a strategy of rhythm control

## 2024-11-24 NOTE — PROGRESS NOTE ADULT - SUBJECTIVE AND OBJECTIVE BOX
Transplant Surgery - Multidisciplinary Progress Note   --------------------------------------------------------------  OLT   11/15/2024        POD#9    Present: Patient seen and examined with multidisciplinary Transplant team including (Surgery: Dr. Hutchinson. Hepatology: Dr. Purdy, Dr. Reese CROCKETT team in AM rounds. Disciplines not in attendance will be notified of the plan.     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    Interval Events:                Immunosuppression:  -FK has been on HOLD, MMF 1/1, SST  -ongoing monitoring for signs of rejection  Potential Discharge date: Pending clinical course     MEDICATIONS  (STANDING):  aspirin  chewable 81 milliGRAM(s) Enteral Tube daily  buDESOnide    Inhalation Suspension 0.5 milliGRAM(s) Inhalation two times a day  calcium carbonate 1250 mG  + Vitamin D (OsCal 500 + D) 1 Tablet(s) Oral two times a day  chlorhexidine 0.12% Liquid 15 milliLiter(s) Oral Mucosa every 12 hours  chlorhexidine 2% Cloths 1 Application(s) Topical <User Schedule>  dexMEDEtomidine Infusion 1.5 MICROgram(s)/kG/Hr (35.1 mL/Hr) IV Continuous <Continuous>  digoxin  Injectable 125 MICROGram(s) IV Push once  fluconAZOLE IVPB 400 milliGRAM(s) IV Intermittent every 24 hours  heparin   Injectable 5000 Unit(s) SubCutaneous every 12 hours  insulin lispro (ADMELOG) corrective regimen sliding scale   SubCutaneous every 6 hours  insulin NPH human recombinant 9 Unit(s) SubCutaneous every 6 hours  levETIRAcetam  IVPB 500 milliGRAM(s) IV Intermittent every 12 hours  linezolid  IVPB      linezolid  IVPB 600 milliGRAM(s) IV Intermittent every 12 hours  LORazepam   Injectable 1 milliGRAM(s) IV Push every 12 hours  meropenem  IVPB 1000 milliGRAM(s) IV Intermittent every 8 hours  metoprolol tartrate Injectable 5 milliGRAM(s) IV Push every 4 hours  multiple electrolytes Injection Type 1 1000 milliLiter(s) (30 mL/Hr) IV Continuous <Continuous>  pantoprazole  Injectable 40 milliGRAM(s) IV Push daily  phenylephrine    Infusion 0.2 MICROgram(s)/kG/Min (7.02 mL/Hr) IV Continuous <Continuous>  predniSONE   Tablet 20 milliGRAM(s) Oral daily  trimethoprim  40 mG/sulfamethoxazole 200 mG Suspension 80 milliGRAM(s) Enteral Tube daily  ursodiol Suspension 300 milliGRAM(s) Oral two times a day  valGANciclovir 50 mG/mL Oral Solution 450 milliGRAM(s) Oral daily    MEDICATIONS  (PRN):      PAST MEDICAL & SURGICAL HISTORY:  Diabetes      Transaminitis      Paroxysmal atrial fibrillation      Depression      BPH (benign prostatic hyperplasia)      Hypertension      Chronic atrial fibrillation      Coronary artery disease      Hepatocellular carcinoma      DM (diabetes mellitus)      HTN (hypertension)      Paroxysmal atrial fibrillation      Cirrhosis      HCC (hepatocellular carcinoma)      History of BPH      History of laparoscopic cholecystectomy      History of lumbar laminectomy      H/O prior ablation treatment      H/O percutaneous left heart catheterization    Vital Signs Last 24 Hrs  T(C): 38.6 (23 Nov 2024 23:00), Max: 38.6 (23 Nov 2024 19:00)  T(F): 101.5 (23 Nov 2024 23:00), Max: 101.5 (23 Nov 2024 19:00)  HR: 97 (24 Nov 2024 01:00) (72 - 112)  BP: 81/54 (24 Nov 2024 01:00) (81/54 - 119/59)  BP(mean): 63 (24 Nov 2024 01:00) (54 - 93)  RR: 21 (24 Nov 2024 01:00) (13 - 26)  SpO2: 100% (24 Nov 2024 01:00) (97% - 100%)  Parameters below as of 23 Nov 2024 19:00  Patient On (Oxygen Delivery Method): ventilator  O2 Concentration (%): 40  I&O's Summary    22 Nov 2024 07:01  -  23 Nov 2024 07:00  --------------------------------------------------------  IN: 2711.9 mL / OUT: 3760 mL / NET: -1048.1 mL    23 Nov 2024 07:01  -  24 Nov 2024 01:39  --------------------------------------------------------  IN: 2482.7 mL / OUT: 3605 mL / NET: -1122.3 mL                        11.2   14.41 )-----------( 209      ( 24 Nov 2024 00:22 )             37.4     11-24    143  |  111[H]  |  39[H]  ----------------------------<  135[H]  3.7   |  23  |  1.20    Ca    7.3[L]      24 Nov 2024 00:22  Phos  2.5     11-24  Mg     2.4     11-24  TPro  4.4[L]  /  Alb  2.7[L]  /  TBili  0.6  /  DBili  x   /  AST  29  /  ALT  42  /  AlkPhos  73  11-24  Tacrolimus (), Serum: 0.8 ng/mL (11-23 @ 06:20)    Review of systems  All other systems were reviewed and are negative, except as noted.    PHYSICAL EXAM:  Constitutional: Well developed / well nourished  Eyes:  PERRLA  ENMT: nc/at, no thrush  Neck: supple   Respiratory: CTA B/L, intubated  Cardiovascular: RRR  Gastrointestinal: +Distended abdomen though improving, appropriate incisional TTP. chevron incision c/d/i, staples in place. No signs of infection.  ALYSSA#2,#3 ss  Genitourinary: Voiding via nichols,   Extremities: SCD's in place and working bilaterally  Neurological: A&O x0, intubated  Skin: no rashes, ulcerations, lesions  Psychiatric: Responsive Transplant Surgery - Multidisciplinary Progress Note   --------------------------------------------------------------  OLT   11/15/2024        POD#9    Present: Patient seen and examined with multidisciplinary Transplant team including (Surgery: Dr. Hutchinson. Hepatology: Dr. Purdy, Dr. Reese CROCKETT team in AM rounds. Disciplines not in attendance will be notified of the plan.     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    Interval Events:  - SBT overnight, tachypneic and tachycardic in AM  - intermittently placed back on ventilator  - f/u SBT and extubation  - started on cyclo 500 BID  - EP f/u: c/w dig 125ug, metoprolol will revist rhythm control once cleared for AC      Immunosuppression:  -FK has been on HOLD, MMF 1/1, SST  -ongoing monitoring for signs of rejection  Potential Discharge date: Pending clinical course     MEDICATIONS  (STANDING):  aspirin  chewable 81 milliGRAM(s) Enteral Tube daily  buDESOnide    Inhalation Suspension 0.5 milliGRAM(s) Inhalation two times a day  calcium carbonate 1250 mG  + Vitamin D (OsCal 500 + D) 1 Tablet(s) Oral two times a day  chlorhexidine 0.12% Liquid 15 milliLiter(s) Oral Mucosa every 12 hours  chlorhexidine 2% Cloths 1 Application(s) Topical <User Schedule>  dexMEDEtomidine Infusion 1.5 MICROgram(s)/kG/Hr (35.1 mL/Hr) IV Continuous <Continuous>  digoxin  Injectable 125 MICROGram(s) IV Push once  fluconAZOLE IVPB 400 milliGRAM(s) IV Intermittent every 24 hours  heparin   Injectable 5000 Unit(s) SubCutaneous every 12 hours  insulin lispro (ADMELOG) corrective regimen sliding scale   SubCutaneous every 6 hours  insulin NPH human recombinant 9 Unit(s) SubCutaneous every 6 hours  levETIRAcetam  IVPB 500 milliGRAM(s) IV Intermittent every 12 hours  linezolid  IVPB      linezolid  IVPB 600 milliGRAM(s) IV Intermittent every 12 hours  LORazepam   Injectable 1 milliGRAM(s) IV Push every 12 hours  meropenem  IVPB 1000 milliGRAM(s) IV Intermittent every 8 hours  metoprolol tartrate Injectable 5 milliGRAM(s) IV Push every 4 hours  multiple electrolytes Injection Type 1 1000 milliLiter(s) (30 mL/Hr) IV Continuous <Continuous>  pantoprazole  Injectable 40 milliGRAM(s) IV Push daily  phenylephrine    Infusion 0.2 MICROgram(s)/kG/Min (7.02 mL/Hr) IV Continuous <Continuous>  predniSONE   Tablet 20 milliGRAM(s) Oral daily  trimethoprim  40 mG/sulfamethoxazole 200 mG Suspension 80 milliGRAM(s) Enteral Tube daily  ursodiol Suspension 300 milliGRAM(s) Oral two times a day  valGANciclovir 50 mG/mL Oral Solution 450 milliGRAM(s) Oral daily    MEDICATIONS  (PRN):      PAST MEDICAL & SURGICAL HISTORY:  Diabetes      Transaminitis      Paroxysmal atrial fibrillation      Depression      BPH (benign prostatic hyperplasia)      Hypertension      Chronic atrial fibrillation      Coronary artery disease      Hepatocellular carcinoma      DM (diabetes mellitus)      HTN (hypertension)      Paroxysmal atrial fibrillation      Cirrhosis      HCC (hepatocellular carcinoma)      History of BPH      History of laparoscopic cholecystectomy      History of lumbar laminectomy      H/O prior ablation treatment      H/O percutaneous left heart catheterization    Vital Signs Last 24 Hrs  T(C): 38.6 (23 Nov 2024 23:00), Max: 38.6 (23 Nov 2024 19:00)  T(F): 101.5 (23 Nov 2024 23:00), Max: 101.5 (23 Nov 2024 19:00)  HR: 97 (24 Nov 2024 01:00) (72 - 112)  BP: 81/54 (24 Nov 2024 01:00) (81/54 - 119/59)  BP(mean): 63 (24 Nov 2024 01:00) (54 - 93)  RR: 21 (24 Nov 2024 01:00) (13 - 26)  SpO2: 100% (24 Nov 2024 01:00) (97% - 100%)  Parameters below as of 23 Nov 2024 19:00  Patient On (Oxygen Delivery Method): ventilator  O2 Concentration (%): 40  I&O's Summary    22 Nov 2024 07:01  -  23 Nov 2024 07:00  --------------------------------------------------------  IN: 2711.9 mL / OUT: 3760 mL / NET: -1048.1 mL    23 Nov 2024 07:01  -  24 Nov 2024 01:39  --------------------------------------------------------  IN: 2482.7 mL / OUT: 3605 mL / NET: -1122.3 mL                        11.2   14.41 )-----------( 209      ( 24 Nov 2024 00:22 )             37.4     11-24    143  |  111[H]  |  39[H]  ----------------------------<  135[H]  3.7   |  23  |  1.20    Ca    7.3[L]      24 Nov 2024 00:22  Phos  2.5     11-24  Mg     2.4     11-24  TPro  4.4[L]  /  Alb  2.7[L]  /  TBili  0.6  /  DBili  x   /  AST  29  /  ALT  42  /  AlkPhos  73  11-24  Tacrolimus (), Serum: 0.8 ng/mL (11-23 @ 06:20)    Review of systems  All other systems were reviewed and are negative, except as noted.    PHYSICAL EXAM:  Constitutional: Well developed / well nourished  Eyes:  PERRLA  ENMT: nc/at, no thrush  Neck: supple   Respiratory: CTA B/L, intubated  Cardiovascular: RRR  Gastrointestinal: +Distended abdomen though improving, appropriate incisional TTP. chevron incision c/d/i, staples in place. No signs of infection.  ALYSSA#2,#3 ss  Genitourinary: Voiding via nichols,   Extremities: SCD's in place and working bilaterally  Neurological: A&O x0, intubated  Skin: no rashes, ulcerations, lesions  Psychiatric: Responsive

## 2024-11-24 NOTE — PROGRESS NOTE ADULT - ATTENDING COMMENTS
I have seen and examined this patient on multidisciplinary SICU rounds this morning. I have reviewed all new labs, imaging and reports. 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 Post op course complicated by AF RVR, hypoxic respiratory failure.     24 hours events:   T max 101.6  Started on linezolid   Red rubber removed   Off pressors     Neuro: precedex for sedation, awake following commands, dilaudid PRN pain, following commands. Ativan taper for possible benzo withdrawal until 11/27. Keppra 500 mg BID. Holding home abilify and remeron  Resp: reintubated 11/20 on PRVC 14 450 5 40%, duo nebs, CXR clear, SBT for possible extubation  CV: HRs intermittently up to 120s. Maintain MAP >65, digoxin 125 daily, metop 12.5 PO q6, holding nifedipine 60, EP following   GI: colonic dilation without obstruction on methylnaltrexone. Trend AXR daily. Immunosuppression per hepatology; LFTs downtrending. Check c diff   Renal: Cr 1.2, lasix 40 IV BID, nichols for retention, start finasteride for BPH   Heme: Hb 11.2, SQH BID, ASA  ID: fluc/bactrim/valcyte ppx, blood cx 11/20 NGTD, zosyn (11/20-11/22), meropenem (11/22- ), linezolid (11/23- ). trend procal q72 hours. Blood cx 11/22 NGTD. Combicath 11/23 negative gram stain  Endo: NPH 9u q6 while NPO  Lines: NGT, nichols, PIVs     The patient required critical care. Critical care time was indicated due to the patient's inherent instability and high risk for decompensation. E & M work was done by me in multiple 10-15 minute intervals over the course of the day in the ICU. I have coordinated care with multiple teams, and reviewed the medical record, consult notes, ICU flow sheets, cardiac monitoring, mechanical ventilation, medication record, brain and body imaging, and serial sequential labs.           Total time spent in the care of this patient today (excluding procedures): 40 minutes                    Over 50% of the total time was spent in discussion and coordination of care with consulting services, dietary and rehab services.      Whit Varela MD  Trauma and Critical Care

## 2024-11-24 NOTE — PROGRESS NOTE ADULT - SUBJECTIVE AND OBJECTIVE BOX
NEUROLOGY FOLLOW-UP CONSULT NOTE    RFC: Encephalopathy, seizure    Interval history: No acute neurologic events overnight. History corroborated by multiple family members (who are physicians), present at bedside. Patient more attentive and following some commands, no further seizure-like activity noted. He has been intermittently febrile and tachycardic. No other reported clear focal neurologic deficits or abnormal movements.    Meds:  MEDICATIONS  (STANDING):  albuterol/ipratropium for Nebulization 3 milliLiter(s) Nebulizer every 6 hours  aspirin  chewable 81 milliGRAM(s) Enteral Tube daily  buDESOnide    Inhalation Suspension 0.5 milliGRAM(s) Inhalation two times a day  calcium carbonate 1250 mG  + Vitamin D (OsCal 500 + D) 1 Tablet(s) Oral two times a day  chlorhexidine 0.12% Liquid 15 milliLiter(s) Oral Mucosa every 12 hours  chlorhexidine 2% Cloths 1 Application(s) Topical <User Schedule>  cycloSPORINE  , modified (GENGRAF) Solution 50 milliGRAM(s) Enteral Tube <User Schedule>  finasteride 5 milliGRAM(s) Oral daily  fluconAZOLE IVPB 400 milliGRAM(s) IV Intermittent every 24 hours  heparin   Injectable 5000 Unit(s) SubCutaneous every 12 hours  insulin lispro (ADMELOG) corrective regimen sliding scale   SubCutaneous every 6 hours  insulin NPH human recombinant 9 Unit(s) SubCutaneous every 6 hours  levETIRAcetam  IVPB 500 milliGRAM(s) IV Intermittent every 12 hours  linezolid  IVPB      linezolid  IVPB 600 milliGRAM(s) IV Intermittent every 12 hours  LORazepam   Injectable 1 milliGRAM(s) IV Push every 12 hours  meropenem  IVPB 1000 milliGRAM(s) IV Intermittent every 8 hours  metoprolol tartrate 12.5 milliGRAM(s) Enteral Tube every 6 hours  multiple electrolytes Injection Type 1 1000 milliLiter(s) (30 mL/Hr) IV Continuous <Continuous>  pantoprazole  Injectable 40 milliGRAM(s) IV Push daily  predniSONE   Tablet 20 milliGRAM(s) Oral daily  propofol Infusion 10 MICROgram(s)/kG/Min (5.62 mL/Hr) IV Continuous <Continuous>  sodium chloride 3%  Inhalation 4 milliLiter(s) Inhalation every 6 hours  trimethoprim  40 mG/sulfamethoxazole 200 mG Suspension 80 milliGRAM(s) Enteral Tube daily  ursodiol Suspension 300 milliGRAM(s) Oral two times a day  valGANciclovir 50 mG/mL Oral Solution 450 milliGRAM(s) Oral daily    MEDICATIONS  (PRN):  HYDROmorphone  Injectable 1 milliGRAM(s) IV Push every 3 hours PRN Breakthrough Pain    GTT:  Precedex 0.7 mcg/kg/min    PMHx/PSHx/FHx/SHx:  Status post liver transplantation    Family history of coronary artery disease (Father, Sibling, Sibling)    Family history of diabetes mellitus (Father, Mother)    Family history of coronary artery disease (Sibling)    Handoff    Diabetes    Transaminitis    Paroxysmal atrial fibrillation    Depression    BPH (benign prostatic hyperplasia)    Hypertension    Hyperlipidemia    Chronic atrial fibrillation    BPH (benign prostatic hyperplasia)    Coronary artery disease    Hepatocellular carcinoma    DM (diabetes mellitus)    HTN (hypertension)    Paroxysmal atrial fibrillation    Cirrhosis    HCC (hepatocellular carcinoma)    History of BPH    Liver cirrhosis    Liver cirrhosis    Major depressive disorder, recurrent episode, mild with anxious distress    Liver cirrhosis    Status post liver transplant    Transplant, liver, into recipient,  donor    History of laparoscopic cholecystectomy    History of lumbar laminectomy    H/O prior ablation treatment    H/O percutaneous left heart catheterization    LIVER TRANSPLANT STATUS    Room Service Assist    [id number]        Allergies:  No Known Allergies      ROS: Due to clinical condition unable to assess (BERE)    O:  T(C): 39.1 (24 @ 12:45), Max: 39.1 (24 @ 12:45)  HR: 77 (24 @ 14:15) (74 - 146)  BP: 78/46 (24 @ 14:15) (78/46 - 139/65)  RR: 20 (24 @ 14:15) (13 - 31)  SpO2: 100% (24 @ 14:15) (94% - 100%)    Focused neurologic exam:  MS - Intubated, sedated, mildly lethargic, attends to all stimuli but auditory = tactile > visual stimuli b/l, no speech output, follows simple commands. BERE orientation, rep/naming, attn/conc/recent and remote memory/fund of knowledge  CN - Pupils sluggishly reactive b/l, EOMI by OCR, VFF as BTT b/l and focuses on faces, face sens/str intact by corneals b/l, hearing grossly intact to conversation b/l, (+) spontaneous respirations (patient rate 22 bpm, vent rate 14 bpm), (+) cough, SCM at least 4/5 b/l and symmetric, tongue midline. BERE palate  Motor - Normal bulk and normal tone throughout. Spontaneous movements of BUE's > BLE's. He grimaces, localizes, and withdraws to strong tactile stimuli in BUE proximal 3+/5 -> distal 4+/5, BLE's proximal 3/5 -> distal 4+/5  Sens - As per Motor above  DTR's - 2+ BR b/l, 0+ rest, and neutral b/l plantar response  Coord - Grossly appropriate for level of strength, no tremors noted  Gait and station - BERE    Pertinent labs/studies:  CBC with inc WBC 14.41, low H/H  and MCV WNL, otherwise essentially WNL  PT/INR inc 15.6/1.36, PTT WNL  BMP inc BUN 39, otherwise essentially WNL  Albumin dec 2.7, LFT's WNL  Mg WNL, Phos WNL  HIV (-), UA (-), B12 WNL, Vitamin D dec < 6.0, TSH WNL, T4 dec 3.1, NH3 WNL, RVP (-), UA (-), Lyme (-)    Tacrolimus level () - 0.8    vEEG  -  EEG Classification / Summary:  Abnormal EEG in the awake, drowsy, and asleep states.   Mild diffuse slowing.  No focal or epileptiform abnormalities are captured.   No seizures.    Clinical Impression:  Mild diffuse cerebral dysfunction is nonspecific in etiology.   No epileptiform abnormalities or seizures.  Single-lead EKG: Irregularly irregular rhythm.    vEEG  -  EEG Classification / Summary:  Abnormal EEG in the awake, drowsy, and asleep states.   Mild diffuse slowing.  No focal or epileptiform abnormalities are captured.   No seizures.    Clinical Impression:  Mild diffuse cerebral dysfunction is nonspecific in etiology.   No epileptiform abnormalities or seizures.  Single-lead EKG: Irregularly irregular rhythm.    < from: CT Head No Cont (24 @ 14:37) >    No acute intracranial hemorrhage, mass effect, or midline shift.    < end of copied text >

## 2024-11-24 NOTE — PROGRESS NOTE ADULT - ASSESSMENT
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 Pt underwent Y90 Sept, 2023 with favorable treatment response.s/p OLT 11/15/24     # S/p OLT 11/15/24 CMV D?R? EBV , toxo D?/R?  VAlcyte, bactrim ppx  fluconazole ppx    # Fever, sepsis, hypoxic respiratory failure s/p on mechanical ventilation  Ct C/A/P Bilateral lower lobe consolidation with fluid and debris in the right lower lobe bronchi, concerning for aspiration pneumonia.  Small right pleural effusion.  Postoperative changes status post liver transplant. No evidence of drainable intra-abdominal fluid collection.  Fluid and gaseous distention of the colon, likely ileus.    # ileus  no diarrhea    Recommend  ·	Fevers becoming persistent despite broad spectrum antibiotics - query alternative causes now (betalactams, medications since 11/20 including dex)  ·	repeat BC x2   ·	UA, UC, repeat RVP in full  ·	miniBAL was sent for  bacterial cx no growth to date, please add fungal cultures, ASp GM, full RVP, legionella PCR  ·	MRSA nare swab  ·	Continue Meropenem 1 gram q 8hr and linezolid for now pending the repeat cultures  ·	if BAl cx remain with no growth, will change jeniffer to aztreonam and reassess fevers   ·	Send Stool for Cdiff testing if diarrhea  ·	Check CMV PCR, ADenovirus PCR in plasma, Cryptococcus antigen in serum  ·	review donor cultures and chart  ·	follow WNV IGG/iGM/PCR  ·	send serum fungitell, aspergillus GM    Son at bedside, answered all his questions    Thank you for involving us in the care of this patient  Transplant ID will continue to follow  Please call or page with additional questions  Pager; #[phone]  Teams: from 8 am to 5 pm  Rachele Lewis MD

## 2024-11-24 NOTE — PROGRESS NOTE ADULT - ASSESSMENT
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 Post-op course c/b worsening mental status and re-intubation 11/20 for acute hypoxemic respiratory failure 2/2 aspiration.     PLAN:    Neuro:  - Sedation with precedex gtt  - pain control w/ Dilaudid   - takes xanax 2mg TID at home  - Benzo taper: Ativan 1mg TID x3d --> 1mg BID x3d  - CT head 11/19 and 11/21 negative  - Keppra restarted: 500mg BID  - Holding home xanax, Abilify, Zoloft, Remeron, Lexapro   - Ammonia 23     Resp:  - Intubated 11/20 2/2 acute hypoxemic respiratory failure 2/2 aspiration   - PRVC 14/450/5/40  - SBT'ing well 11/23  - Home budesonide & duonebs    CV:  - pressors: low dose kassandra  - goal MAP > 65 mmHg  - lactate clear  - cont metoprolol  - Dig loading 11/22-23, 125 starting 11/24    GI:   - Increasing abdominal distension   - Abd xray showing dilated bowel   - Rectal red rubber tube d/c'ed 11/23  - NPO w/ OGT to suction   - Abd XR 11/20: Ileus   - Bowel regimen Movantix qd x 3 days, holding miralax/senna  - Protonix for stress ulcer prophylaxis  - ALYSSA x2, monitor output  - post-op liver doppler w/ patent vasculature   - S/p Neostigmine 2mg x1 11/21  - GI defers decompression until bowel >10cm, currently 7cm  - ENT for endoscopic NGT    Renal:  - Monitor I&Os and electrolytes w/ repletions as necessary  - NS @30 for MORAIMA   - Castro  - Lasix 40 BID    Heme:  - Monitor CBC and coags  - SQH BID for VTE prophylaxis  - ASA PO    ID:   - Empiric ABX w/ Christopher and Linezolid  - Transplant ppx w/ bactrim, valcyte, fluconazole  - immunosuppression w/ steroids, tacro and Cellcept   - Blood cx 11/20 NGTD  - Procal 0.21 -> 0.18, trend q48hr  - BCx 11/22 - f/u results    Endo:   - Monitor glucose   - NPH 9 q6  - moderate ISS  - post-transplant steroid taper     Code Status:    Disposition:    Nadia Taveras PA-C     [id number]

## 2024-11-24 NOTE — PROGRESS NOTE ADULT - ASSESSMENT
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 Transplant ID consulted. on Meropenem. Follow cultures  -diuresis as able  -Reglan as needed  -rectal tube dced.     [ ] AMS   -reintubated, vent per SICU, extubate as able  -Ativan taper, per family was on higher dose Xanax PO, Transplant Psych following  -EEG 11/20: Mild diffuse cerebral dysfunction is nonspecific in etiology. No epileptiform abnormalities or seizures.  -home psych meds as able    [ ] Immunosuppression  -FK HELD, expect Cyclo start this weekend, MMF HELD, SST  -SIMULECT completed  -PPx: Valcyte/Bactrim/PPI/OsCal/Fluc    [ ] HTN/ pAFib  - BB as able  - EP following, ongoing discussions about AYDEN/Cardioversions/full A/C  - Dr. 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 Transplant ID consulted. on Meropenem. Follow cultures  -diuresis as able  -Reglan as needed  -rectal tube dced.     [ ] AMS   -reintubated, vent per SICU, extubate as able  - f/u SBT and possible extubation  -Ativan taper, per family was on higher dose Xanax PO, Transplant Psych following  -EEG 11/20: Mild diffuse cerebral dysfunction is nonspecific in etiology. No epileptiform abnormalities or seizures.  -home psych meds as able    [ ] Immunosuppression  -FK HELD, expect Cyclo start this weekend, MMF HELD, SST  -SIMULECT completed  - 11/24: started cyclo 500 BID  -PPx: Valcyte/Bactrim/PPI/OsCal/Fluc    [ ] HTN/ pAFib  - BB as able  - EP following, ongoing discussions about AYDEN/Cardioversions/full A/C  - Dr. Quintanilla following    [ ] DM  -Lantus/Lispro, adjust prn  -Endocrine as needed given steroids

## 2024-11-24 NOTE — PROGRESS NOTE ADULT - NS ATTEND AMEND GEN_ALL_CORE FT
febrile to 101.6@0500. On Meropenem/Zyvox.  Cx remain neg.  ID following.  AFib RVR - on dig and po betablockade.  Ativan taper continuing, will discuss with SICU a wean strategy  wean to extubate today, was on SBT last night  distension much improved.  Immuno:  cyclo 50mg BID, continue to hold cellcept in setting of fever, pred 20

## 2024-11-24 NOTE — PROGRESS NOTE ADULT - SUBJECTIVE AND OBJECTIVE BOX
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 Pt underwent Y90 Sept, 2023 with favorable treatment response.Pt completed OLTx evaluation now s/p OLT on 11/15/24  Course complicated with fevers, leucocytosis acute decompensation, ? aspiration pneumonia    Transplant ID consulted for help with management  Remains intubated, on pressors  red rubber placed, ileus on CT    overnight has been persistently febrile         T(C): 38.2 (11-23-24 @ 15:00), Max: 38.5 (11-23-24 @ 14:00)  HR: 85 (11-23-24 @ 15:30) (72 - 137)  BP: 88/56 (11-23-24 @ 15:30) (72/50 - 128/63)  RR: 21 (11-23-24 @ 15:30) (15 - 26)  SpO2: 100% (11-23-24 @ 15:30) (93% - 100%)    CONSTITUTIONAL: Well groomed,intubated, awake  EYES: PERRLA and symmetric, EOMI, No conjunctival or scleral injection, non-icteric  ENMT: Oral mucosa with moist membranes. Normal dentition; no pharyngeal injection or exudates             NECK: Supple, symmetric and without tracheal deviation   RESP: No respiratory distress, no use of accessory muscles; CTA b/l, no WRR  CV: RRR, +S1S2, no MRG; no JVD; no peripheral edema  GI:distended, soft    LYMPH: No cervical LAD or tenderness; no axillary LAD or tenderness; no inguinal LAD or tenderness  MSK: Normal gait; No digital clubbing or cyanosis; examination of the (head/neck/spine/ribs/pelvis, RUE, LUE, RLE, LLE) without misalignment,            Normal ROM without pain, no spinal tenderness, normal muscle strength/tone  SKIN: No rashes or ulcers noted; no subcutaneous nodules or induration palpable  NEURO: alert following commands, good strength

## 2024-11-25 NOTE — PROGRESS NOTE ADULT - ATTENDING COMMENTS
SICU night rounds    propofol low dose, to mild sedation  but alert and awake and communicates  head CT non focal  CT shows GGO which could be pna, no abd collections  tolerated PSV 3 hours but tired out, will try SBT in AM  good gas exchange  stable hemodynamics, increased metop for rate control in addition to digoxin  NG , trickle , advance as tolerated  ALYSSA single w 700cc output  nichols in place, balance slightly neg, on plasmalyte @ 30/hr  met with family at bedside including wife and updated them and answered their questions

## 2024-11-25 NOTE — PROGRESS NOTE ADULT - ASSESSMENT
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 Pt underwent Y90 Sept, 2023 with favorable treatment response.s/p OLT 11/15/24     # S/p OLT 11/15/24 CMV D?R? EBV , toxo D?/R?  VAlcyte, bactrim ppx  fluconazole ppx    # Fever, sepsis, hypoxic respiratory failure s/p on mechanical ventilation  Ct C/A/P Bilateral lower lobe consolidation with fluid and debris in the right lower lobe bronchi, concerning for aspiration pneumonia.  CMV PCR (11/24) 146 (low level CMV viremia - not likely contributing)  Adenovirus PCR (11/24) Negative  Cryptococcal Serum Ag (11/24) Negative    # ileus  no diarrhea    Recommend  Agree with repeat CT C/A/P (planned for today)  Fevers becoming persistent despite broad spectrum antibiotics - query alternative causes now (betalactams, medications since 11/20 including dex)  miniBAL was sent for  bacterial cx no growth to date, please add fungal cultures, ASp GM, full RVP, legionella PCR  MRSA nare swab  Continue Meropenem 1 gram q 8hr and linezolid for now pending the repeat cultures  if BAl cx remain with no growth, will change jeniffer to aztreonam and reassess fevers   follow WNV IGG/iGM/PCR  follow serum and bronch aspergillus galactomannan     I will continue to follow. Please feel free to contact me with any further questions.    Kyle Junior M.D.  [facility] Division of Infectious Disease  8AM-5PM Monday - Friday: Available on [employer] Teams  After Hours and Holidays (or if no response on [employer] Teams): Please contact the Infectious Diseases Office at [phone]    The above assessment and plan were discussed with Anne (transplant surgery PA)

## 2024-11-25 NOTE — PROGRESS NOTE ADULT - SUBJECTIVE AND OBJECTIVE BOX
24 HOUR EVENTS:  - trial off precedex   - on prop gtt   - Extubated 11/24 and reintubated d/t tachypnea   - f/u ABG  - trickle feeds 10 --> 20   - Bowel regimen restarted   - F/u BCx and fungal labs   - RUE duplex for swollen arm - negative  - Finasteride, plan for DC nichols in the next 1-2 days  - NPH 9-->6u q6h  - lasix 40 IVP x2 given   - started digoxin  - added cyclosporin  - blood cultures and fungal cultures sent     SUBJECTIVE/ROS:  [ ] A ten-point review of systems was otherwise negative except as noted.  [ ] Due to altered mental status/intubation, subjective information were not able to be obtained from the patient. History was obtained, to the extent possible, from review of the chart and collateral sources of information.      NEURO  Exam: follows commands  Meds: HYDROmorphone  Injectable 1 milliGRAM(s) IV Push every 3 hours PRN Breakthrough Pain  levETIRAcetam  IVPB 500 milliGRAM(s) IV Intermittent every 12 hours  LORazepam   Injectable 1 milliGRAM(s) IV Push every 12 hours  propofol Infusion 5 MICROgram(s)/kG/Min IV Continuous <Continuous>  [x] Adequacy of sedation and pain control has been assessed and adjusted      RESPIRATORY  RR: 16 (11-25-24 @ 00:00) (9 - 40)  SpO2: 99% (11-25-24 @ 00:00) (90% - 100%)  Exam: unlabored, clear to auscultation bilaterally  Mechanical Ventilation: Mode: AC/ CMV (Assist Control/ Continuous Mandatory Ventilation), RR (machine): 14, RR (patient): 16, TV (machine): 450, FiO2: 40, PEEP: 5, ITime: 1, MAP: 7, PIP: 13  ABG - ( 24 Nov 2024 22:53 )  pH: 7.46  /  pCO2: 35    /  pO2: 164   / HCO3: 25    / Base Excess: 1.3   /  SaO2: 100.0     [N/A] Extubation Readiness Assessed  Meds: albuterol/ipratropium for Nebulization 3 milliLiter(s) Nebulizer every 6 hours  buDESOnide    Inhalation Suspension 0.5 milliGRAM(s) Inhalation two times a day  sodium chloride 3%  Inhalation 4 milliLiter(s) Inhalation every 6 hours      CARDIOVASCULAR  HR: 114 (11-25-24 @ 00:00) (68 - 152)  BP: 130/74 (11-24-24 @ 22:28) (75/50 - 158/69)  BP(mean): 92 (11-24-24 @ 22:28) (56 - 108)  ABP: 137/65 (11-25-24 @ 00:00) (110/50 - 166/69)  ABP(mean): 85 (11-25-24 @ 00:00) (65 - 93)  VBG - ( 24 Nov 2024 00:10 )  pH: 7.41  /  pCO2: 44    /  pO2: 39    / HCO3: 28    / Base Excess: 2.8   /  SaO2: 58.6   Lactate: 1.7      Exam: regular rate and rhythm  Cardiac Rhythm: sinus  Perfusion     [x]Adequate   [ ]Inadequate  Mentation   [x]Normal       [ ]Reduced  Extremities  [x]Warm         [ ]Cool  Volume Status [ ]Hypervolemic [x]Euvolemic [ ]Hypovolemic  Meds: digoxin  Injectable 125 MICROGram(s) IV Push daily  metoprolol tartrate 12.5 milliGRAM(s) Enteral Tube every 6 hours      GI/NUTRITION  Exam: soft, nontender, nondistended, incision C/D/I  Diet: tube feeds  Meds: pantoprazole  Injectable 40 milliGRAM(s) IV Push daily  polyethylene glycol 3350 17 Gram(s) Oral every 24 hours  senna Syrup 10 milliLiter(s) Oral every 24 hours  ursodiol Suspension 300 milliGRAM(s) Oral every 12 hours      GENITOURINARY  I&O's Detail    11-23 @ 07:01  -  11-24 @ 07:00  --------------------------------------------------------  IN:    Dexmedetomidine: 262.7 mL    Enteral Tube Flush: 100 mL    IV PiggyBack: 599.8 mL    IV PiggyBack: 900 mL    IV PiggyBack: 600 mL    multiple electrolytes Injection Type 1.: 300 mL    Phenylephrine: 194.1 mL    sodium chloride 0.9% w/ Additives: 300 mL  Total IN: 3256.6 mL    OUT:    Bulb (mL): 430 mL    Bulb (mL): 570 mL    Esmolol: 0 mL    Indwelling Catheter - Urethral (mL): 3010 mL    Nasogastric/Oral tube (mL): 100 mL    Norepinephrine: 0 mL    Rectal Tube (mL): 100 mL  Total OUT: 4210 mL    Total NET: -953.4 mL      11-24 @ 07:01  -  11-25 @ 00:16  --------------------------------------------------------  IN:    Dexmedetomidine: 126.5 mL    Enteral Tube Flush: 110 mL    Glucerna 1.5: 90 mL    IV PiggyBack: 450 mL    IV PiggyBack: 650 mL    multiple electrolytes Injection Type 1.: 510 mL    Propofol: 5.6 mL    Propofol: 22.4 mL  Total IN: 1964.5 mL    OUT:    Bulb (mL): 90 mL    Bulb (mL): 320 mL    Indwelling Catheter - Urethral (mL): 1215 mL  Total OUT: 1625 mL  Total NET: 339.5 mL      11-24    144  |  110[H]  |  55[H]  ----------------------------<  151[H]  4.2   |  22  |  1.44[H]    Ca    7.6[L]      24 Nov 2024 22:56  Phos  4.5     11-24  Mg     2.3     11-24    TPro  4.7[L]  /  Alb  2.7[L]  /  TBili  0.7  /  DBili  x   /  AST  30  /  ALT  42  /  AlkPhos  65  11-24    [ ] Nichols catheter, indication: N/A  Meds: calcium carbonate 1250 mG  + Vitamin D (OsCal 500 + D) 1 Tablet(s) Oral every 12 hours  multiple electrolytes Injection Type 1 1000 milliLiter(s) IV Continuous <Continuous>      HEMATOLOGIC  Meds: aspirin  chewable 81 milliGRAM(s) Enteral Tube daily  heparin   Injectable 5000 Unit(s) SubCutaneous every 12 hours    [x] VTE Prophylaxis                        11.5   19.48 )-----------( 251      ( 24 Nov 2024 22:56 )             38.0     PT/INR - ( 24 Nov 2024 22:56 )   PT: 15.3 sec;   INR: 1.35 ratio         PTT - ( 24 Nov 2024 22:56 )  PTT:26.8 sec      INFECTIOUS DISEASES  WBC Count: 19.48 K/uL (11-24 @ 22:56)  WBC Count: 14.41 K/uL (11-24 @ 00:22)    RECENT CULTURES:  Specimen Source: Combi-Cath  Date/Time: 11-23 @ 13:09  Culture Results:   No growth  Gram Stain:   Moderate polymorphonuclear leukocytes per low power field  No squamous epithelial cells per low power field  No organisms seen  Organism: --  Specimen Source: .Blood BLOOD  Date/Time: 11-22 @ 17:47  Culture Results:   No growth at 48 Hours  Gram Stain: --  Organism: --  Specimen Source: .Blood BLOOD  Date/Time: 11-20 @ 11:53  Culture Results:   No growth at 4 days  Gram Stain: --  Organism: --  Specimen Source: .Blood BLOOD  Date/Time: 11-20 @ 11:43  Culture Results:   No growth at 4 days  Gram Stain: --  Organism: --    Meds: cycloSPORINE  , modified (GENGRAF) Solution 50 milliGRAM(s) Enteral Tube <User Schedule>  fluconAZOLE IVPB 400 milliGRAM(s) IV Intermittent every 24 hours  linezolid  IVPB      linezolid  IVPB 600 milliGRAM(s) IV Intermittent every 12 hours  meropenem  IVPB 1000 milliGRAM(s) IV Intermittent every 8 hours  trimethoprim  40 mG/sulfamethoxazole 200 mG Suspension 80 milliGRAM(s) Enteral Tube daily  valGANciclovir 50 mG/mL Oral Solution 450 milliGRAM(s) Oral daily      ENDOCRINE  CAPILLARY BLOOD GLUCOSE  POCT Blood Glucose.: 141 mg/dL (24 Nov 2024 23:25)  POCT Blood Glucose.: 88 mg/dL (24 Nov 2024 17:49)  POCT Blood Glucose.: 187 mg/dL (24 Nov 2024 11:21)  POCT Blood Glucose.: 125 mg/dL (24 Nov 2024 06:28)    Meds: finasteride 5 milliGRAM(s) Oral daily  insulin lispro (ADMELOG) corrective regimen sliding scale   SubCutaneous every 6 hours  insulin NPH human recombinant 6 Unit(s) SubCutaneous every 6 hours  predniSONE   Tablet 20 milliGRAM(s) Oral daily        CODE STATUS: full code

## 2024-11-25 NOTE — PROGRESS NOTE ADULT - ASSESSMENT
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 Post-op course c/b worsening mental status and re-intubation 11/20 for acute hypoxemic respiratory failure 2/2 aspiration.     PLAN:  Neuro:  - Sedation with low dose propofol gtt (holding precedex iso persistent fevers)  - pain control w/ Dilaudid   - takes xanax 2mg TID at home  - Benzo taper: Ativan 1mg TID x3d --> 1mg BID x3d  - CT head 11/19 and 11/21 negative  - Keppra restarted: 500mg BID  - Holding home xanax, Abilify, Zoloft, Remeron, Lexapro   - Ammonia 23     Resp:  - Intubated 11/20 2/2 acute hypoxemic respiratory failure 2/2 aspiration   - Extubated 11/24 and reintubated 11/24 d/t tachypnea   - PRVC 14/450/5/40  - budesonide, duonebs, hypertonic saline    CV:  - goal MAP > 65 mmHg  - lactate clear  - metoprolol 12.5mg q6  - Dig 125 qd    GI:   - NPO w/ NGT - trickle feeds @ 20  - Bowel regimen: Miralax and Senna  - Protonix for stress ulcer prophylaxis  - ALYSSA x1, monitor output  - post-op liver doppler w/ patent vasculature   - GI defers decompression until bowel >10cm, currently 7cm    Renal:  - Monitor I&Os and electrolytes w/ repletions as necessary  - Plasmalyte @30 for MORAIMA   - Nichols  - Finasteride     Heme:  - Monitor CBC and coags  - SQH BID for VTE prophylaxis  - ASA PO  - SCDs  - RUE duplex neg 11/24    ID:   - Empiric ABX w/ Christopher and Linezolid  - Transplant ppx w/ bactrim, valcyte, fluconazole  - immunosuppression w/ steroids, tacro and Cellcept   - Blood cx 11/20 NGTD, 11/22 NGTD  - F/u BCx 11/24  - Procal 0.21 -> 0.18, trend q48hr  - F/u fungal labs     Endo:   - Monitor glucose   - NPH 6 q6  - moderate ISS  - post-transplant steroid taper     Lines:   - PIVs  - nichols  - NGT  - JPs x1  - L radial A line     Dispo: SICU

## 2024-11-25 NOTE — PROGRESS NOTE ADULT - SUBJECTIVE AND OBJECTIVE BOX
HPI:  Patient seen and examined at bedside in SICU.  Re-intubated.  Rate controlled AFib.    Review Of Systems:  Unable to assesss        Medications:  acetaminophen   IVPB .. 500 milliGRAM(s) IV Intermittent every 6 hours  albuterol/ipratropium for Nebulization 3 milliLiter(s) Nebulizer every 6 hours  aspirin  chewable 81 milliGRAM(s) Enteral Tube daily  buDESOnide    Inhalation Suspension 0.5 milliGRAM(s) Inhalation two times a day  calcium carbonate 1250 mG  + Vitamin D (OsCal 500 + D) 1 Tablet(s) Oral every 12 hours  chlorhexidine 0.12% Liquid 15 milliLiter(s) Oral Mucosa every 12 hours  chlorhexidine 2% Cloths 1 Application(s) Topical <User Schedule>  cycloSPORINE  , modified (GENGRAF) Solution 50 milliGRAM(s) Enteral Tube <User Schedule>  digoxin  Injectable 125 MICROGram(s) IV Push daily  finasteride 5 milliGRAM(s) Oral daily  fluconAZOLE IVPB 400 milliGRAM(s) IV Intermittent every 24 hours  heparin   Injectable 5000 Unit(s) SubCutaneous every 12 hours  HYDROmorphone  Injectable 1 milliGRAM(s) IV Push every 3 hours PRN  insulin lispro (ADMELOG) corrective regimen sliding scale   SubCutaneous every 6 hours  insulin NPH human recombinant 6 Unit(s) SubCutaneous every 6 hours  levETIRAcetam  IVPB 500 milliGRAM(s) IV Intermittent every 12 hours  linezolid  IVPB      linezolid  IVPB 600 milliGRAM(s) IV Intermittent every 12 hours  LORazepam   Injectable 1 milliGRAM(s) IV Push every 12 hours  meropenem  IVPB 1000 milliGRAM(s) IV Intermittent every 8 hours  metoprolol tartrate 50 milliGRAM(s) Enteral Tube every 8 hours  multiple electrolytes Injection Type 1 1000 milliLiter(s) IV Continuous <Continuous>  pantoprazole  Injectable 40 milliGRAM(s) IV Push daily  polyethylene glycol 3350 17 Gram(s) Oral every 24 hours  predniSONE   Tablet 20 milliGRAM(s) Oral daily  propofol Infusion 5 MICROgram(s)/kG/Min IV Continuous <Continuous>  senna Syrup 10 milliLiter(s) Oral every 24 hours  sodium chloride 3%  Inhalation 4 milliLiter(s) Inhalation every 6 hours  ursodiol Suspension 300 milliGRAM(s) Oral every 12 hours  valGANciclovir 50 mG/mL Oral Solution 450 milliGRAM(s) Oral <User Schedule>    PAST MEDICAL & SURGICAL HISTORY:  Diabetes      Transaminitis      Paroxysmal atrial fibrillation      Depression      BPH (benign prostatic hyperplasia)      Hypertension      Chronic atrial fibrillation      Coronary artery disease      Hepatocellular carcinoma      DM (diabetes mellitus)      HTN (hypertension)      Paroxysmal atrial fibrillation      Cirrhosis      HCC (hepatocellular carcinoma)      History of BPH      History of laparoscopic cholecystectomy      History of lumbar laminectomy      H/O prior ablation treatment      H/O percutaneous left heart catheterization        Vitals:  T(C): 38.1 (11-25-24 @ 23:00), Max: 38.1 (11-25-24 @ 23:00)  HR: 104 (11-25-24 @ 23:00) (92 - 148)  BP: 111/56 (11-25-24 @ 23:00) (107/52 - 134/65)  BP(mean): 80 (11-25-24 @ 23:00) (75 - 91)  RR: 18 (11-25-24 @ 23:00) (11 - 25)  SpO2: 100% (11-25-24 @ 23:00) (97% - 100%)  Wt(kg): --  Daily     Daily   I&O's Summary    24 Nov 2024 07:01  -  25 Nov 2024 07:00  --------------------------------------------------------  IN: 2942.5 mL / OUT: 2435 mL / NET: 507.5 mL    25 Nov 2024 07:01  -  25 Nov 2024 23:27  --------------------------------------------------------  IN: 1523.6 mL / OUT: 1590 mL / NET: -66.4 mL        Physical Exam:  Appearance: Intubated, sedated  Eyes: PERRL, EOMI, pink conjunctiva  HENT: +ET tube  Cardiovascular: irregular  Respiratory: Clear to auscultation bilaterally  Gastrointestinal: soft, non-tender, non-distended with normal bowel sounds  Musculoskeletal: No clubbing; no joint deformity                         11.4   22.27 )-----------( 261      ( 25 Nov 2024 05:44 )             37.6     11-25    143  |  109[H]  |  56[H]  ----------------------------<  177[H]  4.0   |  22  |  1.49[H]    Ca    7.9[L]      25 Nov 2024 05:44  Phos  3.7     11-25  Mg     2.4     11-25    TPro  4.6[L]  /  Alb  2.6[L]  /  TBili  0.9  /  DBili  x   /  AST  40  /  ALT  43  /  AlkPhos  93  11-25    PT/INR - ( 25 Nov 2024 05:45 )   PT: 15.7 sec;   INR: 1.38 ratio         PTT - ( 25 Nov 2024 05:45 )  PTT:25.9 sec          Cardiovascular Diagnostic Testing:  ECG: sinus rhythm    Echo: < from: Intra-Operative Transesophageal Echo W or WO Ultrasound Enhancing Agent (11.15.24 @ 07:46) >  --------------------------------------------------------------------------------  PRE-BYPASS FINDINGS     Left Ventricle:  Left ventricular ejection fraction is estimated at 60 to 65%. Normal left ventricularwall thickness. The left ventricular systolic function is normal There are no regional wall motion abnormalities seen.     Right Ventricle:  The right ventricular cavity is normal in size, with normal wall thickness and right ventricular systolic function is normal. Right ventricular systolic function is normal.     Left Atrium:  The left atrium is normal in size. No thrombus visualized in left atrial appendage.     Right Atrium:  The right atrium is normal in size. The right atrium is normal in size.     Interatrial Septum:  No PFO visualized with color flow doppler.     Aortic Valve:  The aortic valve appears trileaflet. There is no aortic valve stenosis. There is trace aortic regurgitation.     Mitral Valve:  There is no mitral valve stenosis. There is no mitral valve stenosis. There is trace mitral regurgitation.     Tricuspid Valve:  There is no evidence of tricuspid stenosis. There is trace tricuspid regurgitation.     Pericardium:  No pericardial effusion seen.     Post-Bypass:  S/P OLT. Under current loading conditions, hyperdynamic left ventricular systolic function, EF: 70-75% by visual estimate, no RWMA. Normal right ventricular systolic function. All other findings unchanged. Probe removed atraumatically, no blood. The patient tolerated the procedure well and without complications. Permanent recorded images are stored in the medical record.     Electronically signed by James Villareal on 11/15/2024 at 2:18:16 PM         *** Final ***    < end of copied text >      Stress Testing:     Cath: 4/24/2024, patient had his LHC repeated with Ben Villareal MD at [facility].  Cath showed multivessel coronary artery disease, but the lesions previously noted were FFR negative.  He continues to have MIRTA 3 flow throughout.    Interpretation of Telemetry: AFib with RVR    Imaging:

## 2024-11-25 NOTE — PROGRESS NOTE ADULT - SUBJECTIVE AND OBJECTIVE BOX
Biopsy Photograph Reviewed: Yes Follow Up:      Interval History:    REVIEW OF SYSTEMS  [  ] ROS unobtainable because:    [  ] All other systems negative except as noted below    Constitutional:  [ ] fever [ ] chills  [ ] weight loss  [ ] weakness  Skin:  [ ] rash [ ] phlebitis	  Eyes: [ ] icterus [ ] pain  [ ] discharge	  ENMT: [ ] sore throat  [ ] thrush [ ] ulcers [ ] exudates  Respiratory: [ ] dyspnea [ ] hemoptysis [ ] cough [ ] sputum	  Cardiovascular:  [ ] chest pain [ ] palpitations [ ] edema	  Gastrointestinal:  [ ] nausea [ ] vomiting [ ] diarrhea [ ] constipation [ ] pain	  Genitourinary:  [ ] dysuria [ ] frequency [ ] hematuria [ ] discharge [ ] flank pain  [ ] incontinence  Musculoskeletal:  [ ] myalgias [ ] arthralgias [ ] arthritis  [ ] back pain  Neurological:  [ ] headache [ ] seizures  [ ] confusion/altered mental status    Allergies  No Known Allergies        ANTIMICROBIALS:  fluconAZOLE IVPB 400 every 24 hours  linezolid  IVPB 600 every 12 hours  linezolid  IVPB    meropenem  IVPB 1000 every 8 hours  valGANciclovir 50 mG/mL Oral Solution 450 <User Schedule>      OTHER MEDS:  MEDICATIONS  (STANDING):  albuterol/ipratropium for Nebulization 3 every 6 hours  aspirin  chewable 81 daily  buDESOnide    Inhalation Suspension 0.5 two times a day  cycloSPORINE  , modified (GENGRAF) Solution 50 <User Schedule>  digoxin  Injectable 125 daily  finasteride 5 daily  heparin   Injectable 5000 every 12 hours  HYDROmorphone  Injectable 1 every 3 hours PRN  insulin lispro (ADMELOG) corrective regimen sliding scale  every 6 hours  insulin NPH human recombinant 6 every 6 hours  levETIRAcetam  IVPB 500 every 12 hours  LORazepam   Injectable 1 every 12 hours  metoprolol tartrate 50 every 8 hours  pantoprazole  Injectable 40 daily  polyethylene glycol 3350 17 every 24 hours  predniSONE   Tablet 20 daily  propofol Infusion 5 <Continuous>  senna Syrup 10 every 24 hours  sodium chloride 3%  Inhalation 4 every 6 hours  ursodiol Suspension 300 every 12 hours      Vital Signs Last 24 Hrs  T(C): 37.4 (2024 15:00), Max: 38 (2024 05:00)  T(F): 99.3 (2024 15:00), Max: 100.4 (2024 05:00)  HR: 103 (2024 16:22) (94 - 152)  BP: 134/65 (2024 01:00) (114/57 - 158/69)  BP(mean): 87 (2024 01:00) (78 - 108)  RR: 12 (2024 15:00) (9 - 27)  SpO2: 99% (2024 16:22) (97% - 100%)    Parameters below as of 2024 12:06  Patient On (Oxygen Delivery Method): ventilator        PHYSICAL EXAMINATION:  General: Alert and Awake, NAD  HEENT: PERRL, EOMI  Neck: Supple  Cardiac: RRR, No M/R/G  Resp: CTAB, No Wh/Rh/Ra  Abdomen: NBS, NT/ND, No HSM, No rigidity or guarding  MSK: No LE edema. No Calf tenderness  : No nichols  Skin: No rashes or lesions. Skin is warm and dry to the touch.   Neuro: Alert and Awake. CN 2-12 Grossly intact. Moves all four extremities spontaneously.  Psych: Calm, Pleasant, Cooperative                          11.4   22.27 )-----------( 261      ( 2024 05:44 )             37.6           143  |  109[H]  |  56[H]  ----------------------------<  177[H]  4.0   |  22  |  1.49[H]    Ca    7.9[L]      2024 05:44  Phos  3.7       Mg     2.4         TPro  4.6[L]  /  Alb  2.6[L]  /  TBili  0.9  /  DBili  x   /  AST  40  /  ALT  43  /  AlkPhos  93        Urinalysis Basic - ( 2024 05:44 )    Color: x / Appearance: x / SG: x / pH: x  Gluc: 177 mg/dL / Ketone: x  / Bili: x / Urobili: x   Blood: x / Protein: x / Nitrite: x   Leuk Esterase: x / RBC: x / WBC x   Sq Epi: x / Non Sq Epi: x / Bacteria: x        MICROBIOLOGY:  v  Bronchial  24   Testing in progress  --  --      .Blood BLOOD  24   No growth at 24 hours  --  --      .Blood BLOOD  24   No growth at 24 hours  --  --      Combi-Cath  24   Commensal charity consistent with body site  --    Moderate polymorphonuclear leukocytes per low power field  No squamous epithelial cells per low power field  No organisms seen      .Blood BLOOD  24   No growth at 48 Hours  --  --      .Blood BLOOD  24   No growth at 5 days  --  --      .Blood BLOOD  24   No growth at 5 days  --  --      Body Fluid  11-15-24   No growth at 5 days  --    No polymorphonuclear leukocytes seen  No organisms seen  by cytocentrifuge        Toxoplasma IgG Screen: 78.00 IU/mL (11-15-24 @ 00:41)  CMV IgG Antibody: 1.50 U/mL (11-15-24 @ 00:41)    Rapid RVP Result: NotDetec ( @ 14:53)  CMVPCR Lo.16 Bpm22SV/mL ( @ 11:24)        RADIOLOGY:    <The imaging below has been reviewed and visualized by me independently. Findings as detailed in report below> Size Of Lesion In Cm: 0.9 X Size Of Lesion In Cm (Optional): 0.4 Size Of Margin In Cm: 0.3 Anesthesia Volume In Cc: 6 Follow Up:  fever    Interval History: continued fevers overnight. remains intubated.     REVIEW OF SYSTEMS  [ x ] ROS unobtainable because:  intubated / sedated  [  ] All other systems negative except as noted below    Constitutional:  [ ] fever [ ] chills  [ ] weight loss  [ ] weakness  Skin:  [ ] rash [ ] phlebitis	  Eyes: [ ] icterus [ ] pain  [ ] discharge	  ENMT: [ ] sore throat  [ ] thrush [ ] ulcers [ ] exudates  Respiratory: [ ] dyspnea [ ] hemoptysis [ ] cough [ ] sputum	  Cardiovascular:  [ ] chest pain [ ] palpitations [ ] edema	  Gastrointestinal:  [ ] nausea [ ] vomiting [ ] diarrhea [ ] constipation [ ] pain	  Genitourinary:  [ ] dysuria [ ] frequency [ ] hematuria [ ] discharge [ ] flank pain  [ ] incontinence  Musculoskeletal:  [ ] myalgias [ ] arthralgias [ ] arthritis  [ ] back pain  Neurological:  [ ] headache [ ] seizures  [ ] confusion/altered mental status    Allergies  No Known Allergies        ANTIMICROBIALS:  fluconAZOLE IVPB 400 every 24 hours  linezolid  IVPB 600 every 12 hours  linezolid  IVPB    meropenem  IVPB 1000 every 8 hours  valGANciclovir 50 mG/mL Oral Solution 450 <User Schedule>      OTHER MEDS:  MEDICATIONS  (STANDING):  albuterol/ipratropium for Nebulization 3 every 6 hours  aspirin  chewable 81 daily  buDESOnide    Inhalation Suspension 0.5 two times a day  cycloSPORINE  , modified (GENGRAF) Solution 50 <User Schedule>  digoxin  Injectable 125 daily  finasteride 5 daily  heparin   Injectable 5000 every 12 hours  HYDROmorphone  Injectable 1 every 3 hours PRN  insulin lispro (ADMELOG) corrective regimen sliding scale  every 6 hours  insulin NPH human recombinant 6 every 6 hours  levETIRAcetam  IVPB 500 every 12 hours  LORazepam   Injectable 1 every 12 hours  metoprolol tartrate 50 every 8 hours  pantoprazole  Injectable 40 daily  polyethylene glycol 3350 17 every 24 hours  predniSONE   Tablet 20 daily  propofol Infusion 5 <Continuous>  senna Syrup 10 every 24 hours  sodium chloride 3%  Inhalation 4 every 6 hours  ursodiol Suspension 300 every 12 hours      Vital Signs Last 24 Hrs  T(C): 37.4 (2024 15:00), Max: 38 (2024 05:00)  T(F): 99.3 (2024 15:00), Max: 100.4 (2024 05:00)  HR: 103 (2024 16:22) (94 - 152)  BP: 134/65 (2024 01:00) (114/57 - 158/69)  BP(mean): 87 (2024 01:00) (78 - 108)  RR: 12 (2024 15:00) (9 - 27)  SpO2: 99% (2024 16:22) (97% - 100%)    Parameters below as of 2024 12:06  Patient On (Oxygen Delivery Method): ventilator    PHYSICAL EXAMINATION:  General: Intubated and Sedated  HEENT: +ETT  Neck: Supple  Cardiac: RRR, No M/R/G  Resp: CTAB, No Wh/Rh/Ra  Abdomen: NBS, NT/ND, No HSM, No rigidity or guarding  MSK: No LE edema. No Calf tenderness  Skin: No rashes or lesions. Skin is warm and dry to the touch.  Neuro: Intubated and Sedated  Psych: Unable to assess - intubated and sedated                          11.4   22.27 )-----------( 261      ( 2024 05:44 )             37.6           143  |  109[H]  |  56[H]  ----------------------------<  177[H]  4.0   |  22  |  1.49[H]    Ca    7.9[L]      2024 05:44  Phos  3.7       Mg     2.4         TPro  4.6[L]  /  Alb  2.6[L]  /  TBili  0.9  /  DBili  x   /  AST  40  /  ALT  43  /  AlkPhos  93        Urinalysis Basic - ( 2024 05:44 )    Color: x / Appearance: x / SG: x / pH: x  Gluc: 177 mg/dL / Ketone: x  / Bili: x / Urobili: x   Blood: x / Protein: x / Nitrite: x   Leuk Esterase: x / RBC: x / WBC x   Sq Epi: x / Non Sq Epi: x / Bacteria: x        MICROBIOLOGY:  v  Bronchial  24   Testing in progress  --  --      .Blood BLOOD  24   No growth at 24 hours  --  --      .Blood BLOOD  24   No growth at 24 hours  --  --      Combi-Cath  24   Commensal charity consistent with body site  --    Moderate polymorphonuclear leukocytes per low power field  No squamous epithelial cells per low power field  No organisms seen      .Blood BLOOD  24   No growth at 48 Hours  --  --      .Blood BLOOD  24   No growth at 5 days  --  --      .Blood BLOOD  24   No growth at 5 days  --  --      Body Fluid  11-15-24   No growth at 5 days  --    No polymorphonuclear leukocytes seen  No organisms seen  by cytocentrifuge        Toxoplasma IgG Screen: 78.00 IU/mL (11-15-24 @ 00:41)  CMV IgG Antibody: 1.50 U/mL (11-15-24 @ 00:41)    Rapid RVP Result: NotDetec ( @ 14:53)  CMVPCR Lo.16 Pzq56QV/mL ( @ 11:24)        RADIOLOGY:    <The imaging below has been reviewed and visualized by me independently. Findings as detailed in report below>    < from: CT Abdomen and Pelvis No Cont (24 @ 17:56) >  IMPRESSION:  Groundglass opacities in theright lower and left upper lobes, possibly   infectious in nature.    Decreased bibasilar atelectasis.    No intra-abdominal fluid collection.    < end of copied text >   Was An Eye Clamp Used?: No Eye Clamp Note Details: An eye clamp was used during the procedure. Excision Method: Elliptical Saucerization Depth: dermis and superficial adipose tissue Repair Type: Intermediate Intermediate / Complex Repair - Final Wound Length In Cm: 3 Suturegard Retention Suture: 2-0 Nylon Retention Suture Bite Size: 3 mm Length To Time In Minutes Device Was In Place: 10 Number Of Hemigard Strips Per Side: 1 Undermining Type: Entire Wound Debridement Text: The wound edges were debrided prior to proceeding with the closure to facilitate wound healing. Helical Rim Text: The closure involved the helical rim. Vermilion Border Text: The closure involved the vermilion border. Nostril Rim Text: The closure involved the nostril rim. Retention Suture Text: Retention sutures were placed to support the closure and prevent dehiscence. Primary Defect Length (In Cm): 0 Suture Removal: 14 days Lab: [id number] Epidermal Closure Graft Donor Site (Optional): simple interrupted Billing Type: Third-Party Bill Excision Depth: adipose tissue Scalpel Size: 15 blade Anesthesia Type: 1% lidocaine with epinephrine Hemostasis: Electrocautery Estimated Blood Loss (Cc): minimal Detail Level: Detailed Deep Sutures: 4-0 Vicryl Epidermal Sutures: 4-0 Prolene Wound Care: Vaseline Dressing: pressure dressing with telfa Suturegard Intro: Intraoperative tissue expansion was performed, utilizing the SUTUREGARD device, in order to reduce wound tension. Suturegard Body: The suture ends were repeatedly re-tightened and re-clamped to achieve the desired tissue expansion. Hemigard Intro: Due to skin fragility and wound tension, it was decided to use HEMIGARD adhesive retention suture devices to permit a linear closure. The skin was cleaned and dried for a 6cm distance away from the wound. Excessive hair, if present, was removed to allow for adhesion. Hemigard Postcare Instructions: The HEMIGARD strips are to remain completely dry for at least 5-7 days. Positioning (Leave Blank If You Do Not Want): The patient was placed in a comfortable position exposing the surgical site. Pre-Excision Curettage Text (Leave Blank If You Do Not Want): Prior to drawing the surgical margin the visible lesion was removed with electrodesiccation and curettage to clearly define the lesion size. Complex Repair Preamble Text (Leave Blank If You Do Not Want): Extensive wide undermining was performed. Intermediate Repair Preamble Text (Leave Blank If You Do Not Want): Undermining was performed with blunt dissection. Curvilinear Excision Additional Text (Leave Blank If You Do Not Want): The margin was drawn around the clinically apparent lesion.  A curvilinear shape was then drawn on the skin incorporating the lesion and margins.  Incisions were then made along these lines to the appropriate tissue plane and the lesion was extirpated. Fusiform Excision Additional Text (Leave Blank If You Do Not Want): The margin was drawn around the clinically apparent lesion.  A fusiform shape was then drawn on the skin incorporating the lesion and margins.  Incisions were then made along these lines to the appropriate tissue plane and the lesion was extirpated. Elliptical Excision Additional Text (Leave Blank If You Do Not Want): The margin was drawn around the clinically apparent lesion.  An elliptical shape was then drawn on the skin incorporating the lesion and margins.  Incisions were then made along these lines to the appropriate tissue plane and the lesion was extirpated. Saucerization Excision Additional Text (Leave Blank If You Do Not Want): The margin was drawn around the clinically apparent lesion.  Incisions were then made along these lines, in a tangential fashion, to the appropriate tissue plane and the lesion was extirpated. Slit Excision Additional Text (Leave Blank If You Do Not Want): A linear line was drawn on the skin overlying the lesion. An incision was made slowly until the lesion was visualized.  Once visualized, the lesion was removed with blunt dissection. Perilesional Excision Additional Text (Leave Blank If You Do Not Want): The margin was drawn around the clinically apparent lesion. Incisions were then made along these lines to the appropriate tissue plane and the lesion was extirpated. Repair Performed By Another Provider Text (Leave Blank If You Do Not Want): After the tissue was excised the defect was repaired by another provider. No Repair - Repaired With Adjacent Surgical Defect Text (Leave Blank If You Do Not Want): After the excision the defect was repaired concurrently with another surgical defect which was in close approximation. Adjacent Tissue Transfer Text: The defect edges were debeveled with a #15 scalpel blade.  Given the location of the defect and the proximity to free margins an adjacent tissue transfer was deemed most appropriate.  Using a sterile surgical marker, an appropriate flap was drawn incorporating the defect and placing the expected incisions within the relaxed skin tension lines where possible.    The area thus outlined was incised deep to adipose tissue with a #15 scalpel blade.  The skin margins were undermined to an appropriate distance in all directions utilizing iris scissors. Advancement Flap (Single) Text: The defect edges were debeveled with a #15 scalpel blade.  Given the location of the defect and the proximity to free margins a single advancement flap was deemed most appropriate.  Using a sterile surgical marker, an appropriate advancement flap was drawn incorporating the defect and placing the expected incisions within the relaxed skin tension lines where possible.    The area thus outlined was incised deep to adipose tissue with a #15 scalpel blade.  The skin margins were undermined to an appropriate distance in all directions utilizing iris scissors. Advancement Flap (Double) Text: The defect edges were debeveled with a #15 scalpel blade.  Given the location of the defect and the proximity to free margins a double advancement flap was deemed most appropriate.  Using a sterile surgical marker, the appropriate advancement flaps were drawn incorporating the defect and placing the expected incisions within the relaxed skin tension lines where possible.    The area thus outlined was incised deep to adipose tissue with a #15 scalpel blade.  The skin margins were undermined to an appropriate distance in all directions utilizing iris scissors. Burow's Advancement Flap Text: The defect edges were debeveled with a #15 scalpel blade.  Given the location of the defect and the proximity to free margins a Burow's advancement flap was deemed most appropriate.  Using a sterile surgical marker, the appropriate advancement flap was drawn incorporating the defect and placing the expected incisions within the relaxed skin tension lines where possible.    The area thus outlined was incised deep to adipose tissue with a #15 scalpel blade.  The skin margins were undermined to an appropriate distance in all directions utilizing iris scissors. Chonodrocutaneous Helical Advancement Flap Text: The defect edges were debeveled with a #15 scalpel blade.  Given the location of the defect and the proximity to free margins a chondrocutaneous helical advancement flap was deemed most appropriate.  Using a sterile surgical marker, the appropriate advancement flap was drawn incorporating the defect and placing the expected incisions within the relaxed skin tension lines where possible.    The area thus outlined was incised deep to adipose tissue with a #15 scalpel blade.  The skin margins were undermined to an appropriate distance in all directions utilizing iris scissors. Crescentic Advancement Flap Text: The defect edges were debeveled with a #15 scalpel blade.  Given the location of the defect and the proximity to free margins a crescentic advancement flap was deemed most appropriate.  Using a sterile surgical marker, the appropriate advancement flap was drawn incorporating the defect and placing the expected incisions within the relaxed skin tension lines where possible.    The area thus outlined was incised deep to adipose tissue with a #15 scalpel blade.  The skin margins were undermined to an appropriate distance in all directions utilizing iris scissors. A-T Advancement Flap Text: The defect edges were debeveled with a #15 scalpel blade.  Given the location of the defect, shape of the defect and the proximity to free margins an A-T advancement flap was deemed most appropriate.  Using a sterile surgical marker, an appropriate advancement flap was drawn incorporating the defect and placing the expected incisions within the relaxed skin tension lines where possible.    The area thus outlined was incised deep to adipose tissue with a #15 scalpel blade.  The skin margins were undermined to an appropriate distance in all directions utilizing iris scissors. O-T Advancement Flap Text: The defect edges were debeveled with a #15 scalpel blade.  Given the location of the defect, shape of the defect and the proximity to free margins an O-T advancement flap was deemed most appropriate.  Using a sterile surgical marker, an appropriate advancement flap was drawn incorporating the defect and placing the expected incisions within the relaxed skin tension lines where possible.    The area thus outlined was incised deep to adipose tissue with a #15 scalpel blade.  The skin margins were undermined to an appropriate distance in all directions utilizing iris scissors. O-L Flap Text: The defect edges were debeveled with a #15 scalpel blade.  Given the location of the defect, shape of the defect and the proximity to free margins an O-L flap was deemed most appropriate.  Using a sterile surgical marker, an appropriate advancement flap was drawn incorporating the defect and placing the expected incisions within the relaxed skin tension lines where possible.    The area thus outlined was incised deep to adipose tissue with a #15 scalpel blade.  The skin margins were undermined to an appropriate distance in all directions utilizing iris scissors. O-Z Flap Text: The defect edges were debeveled with a #15 scalpel blade.  Given the location of the defect, shape of the defect and the proximity to free margins an O-Z flap was deemed most appropriate.  Using a sterile surgical marker, an appropriate transposition flap was drawn incorporating the defect and placing the expected incisions within the relaxed skin tension lines where possible. The area thus outlined was incised deep to adipose tissue with a #15 scalpel blade.  The skin margins were undermined to an appropriate distance in all directions utilizing iris scissors. Double O-Z Flap Text: The defect edges were debeveled with a #15 scalpel blade.  Given the location of the defect, shape of the defect and the proximity to free margins a Double O-Z flap was deemed most appropriate.  Using a sterile surgical marker, an appropriate transposition flap was drawn incorporating the defect and placing the expected incisions within the relaxed skin tension lines where possible. The area thus outlined was incised deep to adipose tissue with a #15 scalpel blade.  The skin margins were undermined to an appropriate distance in all directions utilizing iris scissors. V-Y Flap Text: The defect edges were debeveled with a #15 scalpel blade.  Given the location of the defect, shape of the defect and the proximity to free margins a V-Y flap was deemed most appropriate.  Using a sterile surgical marker, an appropriate advancement flap was drawn incorporating the defect and placing the expected incisions within the relaxed skin tension lines where possible.    The area thus outlined was incised deep to adipose tissue with a #15 scalpel blade.  The skin margins were undermined to an appropriate distance in all directions utilizing iris scissors. Advancement-Rotation Flap Text: The defect edges were debeveled with a #15 scalpel blade.  Given the location of the defect, shape of the defect and the proximity to free margins an advancement-rotation flap was deemed most appropriate.  Using a sterile surgical marker, an appropriate flap was drawn incorporating the defect and placing the expected incisions within the relaxed skin tension lines where possible. The area thus outlined was incised deep to adipose tissue with a #15 scalpel blade.  The skin margins were undermined to an appropriate distance in all directions utilizing iris scissors. Mercedes Flap Text: The defect edges were debeveled with a #15 scalpel blade.  Given the location of the defect, shape of the defect and the proximity to free margins a Mercedes flap was deemed most appropriate.  Using a sterile surgical marker, an appropriate advancement flap was drawn incorporating the defect and placing the expected incisions within the relaxed skin tension lines where possible. The area thus outlined was incised deep to adipose tissue with a #15 scalpel blade.  The skin margins were undermined to an appropriate distance in all directions utilizing iris scissors. Modified Advancement Flap Text: The defect edges were debeveled with a #15 scalpel blade.  Given the location of the defect, shape of the defect and the proximity to free margins a modified advancement flap was deemed most appropriate.  Using a sterile surgical marker, an appropriate advancement flap was drawn incorporating the defect and placing the expected incisions within the relaxed skin tension lines where possible.    The area thus outlined was incised deep to adipose tissue with a #15 scalpel blade.  The skin margins were undermined to an appropriate distance in all directions utilizing iris scissors. Mucosal Advancement Flap Text: Given the location of the defect, shape of the defect and the proximity to free margins a mucosal advancement flap was deemed most appropriate. Incisions were made with a 15 blade scalpel in the appropriate fashion along the cutaneous vermillion border and the mucosal lip. The remaining actinically damaged mucosal tissue was excised.  The mucosal advancement flap was then elevated to the gingival sulcus with care taken to preserve the neurovascular structures and advanced into the primary defect. Care was taken to ensure that precise realignment of the vermillion border was achieved. Peng Advancement Flap Text: The defect edges were debeveled with a #15 scalpel blade.  Given the location of the defect, shape of the defect and the proximity to free margins a Peng advancement flap was deemed most appropriate.  Using a sterile surgical marker, an appropriate advancement flap was drawn incorporating the defect and placing the expected incisions within the relaxed skin tension lines where possible. The area thus outlined was incised deep to adipose tissue with a #15 scalpel blade.  The skin margins were undermined to an appropriate distance in all directions utilizing iris scissors. Hatchet Flap Text: The defect edges were debeveled with a #15 scalpel blade.  Given the location of the defect, shape of the defect and the proximity to free margins a hatchet flap was deemed most appropriate.  Using a sterile surgical marker, an appropriate hatchet flap was drawn incorporating the defect and placing the expected incisions within the relaxed skin tension lines where possible.    The area thus outlined was incised deep to adipose tissue with a #15 scalpel blade.  The skin margins were undermined to an appropriate distance in all directions utilizing iris scissors. Rotation Flap Text: The defect edges were debeveled with a #15 scalpel blade.  Given the location of the defect, shape of the defect and the proximity to free margins a rotation flap was deemed most appropriate.  Using a sterile surgical marker, an appropriate rotation flap was drawn incorporating the defect and placing the expected incisions within the relaxed skin tension lines where possible.    The area thus outlined was incised deep to adipose tissue with a #15 scalpel blade.  The skin margins were undermined to an appropriate distance in all directions utilizing iris scissors. Bilateral Rotation Flap Text: The defect edges were debeveled with a #15 scalpel blade. Given the location of the defect, shape of the defect and the proximity to free margins a bilateral rotation flap was deemed most appropriate. Using a sterile surgical marker, an appropriate rotation flap was drawn incorporating the defect and placing the expected incisions within the relaxed skin tension lines where possible. The area thus outlined was incised deep to adipose tissue with a #15 scalpel blade. The skin margins were undermined to an appropriate distance in all directions utilizing iris scissors. Following this, the designed flap was carried over into the primary defect and sutured into place. Spiral Flap Text: The defect edges were debeveled with a #15 scalpel blade.  Given the location of the defect, shape of the defect and the proximity to free margins a spiral flap was deemed most appropriate.  Using a sterile surgical marker, an appropriate rotation flap was drawn incorporating the defect and placing the expected incisions within the relaxed skin tension lines where possible. The area thus outlined was incised deep to adipose tissue with a #15 scalpel blade.  The skin margins were undermined to an appropriate distance in all directions utilizing iris scissors. Staged Advancement Flap Text: The defect edges were debeveled with a #15 scalpel blade.  Given the location of the defect, shape of the defect and the proximity to free margins a staged advancement flap was deemed most appropriate.  Using a sterile surgical marker, an appropriate advancement flap was drawn incorporating the defect and placing the expected incisions within the relaxed skin tension lines where possible. The area thus outlined was incised deep to adipose tissue with a #15 scalpel blade.  The skin margins were undermined to an appropriate distance in all directions utilizing iris scissors. Star Wedge Flap Text: The defect edges were debeveled with a #15 scalpel blade.  Given the location of the defect, shape of the defect and the proximity to free margins a star wedge flap was deemed most appropriate.  Using a sterile surgical marker, an appropriate rotation flap was drawn incorporating the defect and placing the expected incisions within the relaxed skin tension lines where possible. The area thus outlined was incised deep to adipose tissue with a #15 scalpel blade.  The skin margins were undermined to an appropriate distance in all directions utilizing iris scissors. Transposition Flap Text: The defect edges were debeveled with a #15 scalpel blade.  Given the location of the defect and the proximity to free margins a transposition flap was deemed most appropriate.  Using a sterile surgical marker, an appropriate transposition flap was drawn incorporating the defect.    The area thus outlined was incised deep to adipose tissue with a #15 scalpel blade.  The skin margins were undermined to an appropriate distance in all directions utilizing iris scissors. Muscle Hinge Flap Text: The defect edges were debeveled with a #15 scalpel blade.  Given the size, depth and location of the defect and the proximity to free margins a muscle hinge flap was deemed most appropriate.  Using a sterile surgical marker, an appropriate hinge flap was drawn incorporating the defect. The area thus outlined was incised with a #15 scalpel blade.  The skin margins were undermined to an appropriate distance in all directions utilizing iris scissors. Mustarde Flap Text: The defect edges were debeveled with a #15 scalpel blade.  Given the size, depth and location of the defect and the proximity to free margins a Mustarde flap was deemed most appropriate.  Using a sterile surgical marker, an appropriate flap was drawn incorporating the defect. The area thus outlined was incised with a #15 scalpel blade.  The skin margins were undermined to an appropriate distance in all directions utilizing iris scissors. Nasal Turnover Hinge Flap Text: The defect edges were debeveled with a #15 scalpel blade.  Given the size, depth, location of the defect and the defect being full thickness a nasal turnover hinge flap was deemed most appropriate.  Using a sterile surgical marker, an appropriate hinge flap was drawn incorporating the defect. The area thus outlined was incised with a #15 scalpel blade. The flap was designed to recreate the nasal mucosal lining and the alar rim. The skin margins were undermined to an appropriate distance in all directions utilizing iris scissors. Nasalis-Muscle-Based Myocutaneous Island Pedicle Flap Text: Using a #15 blade, an incision was made around the donor flap to the level of the nasalis muscle. Wide lateral undermining was then performed in both the subcutaneous plane above the nasalis muscle, and in a submuscular plane just above periosteum. This allowed the formation of a free nasalis muscle axial pedicle (based on the angular artery) which was still attached to the actual cutaneous flap, increasing its mobility and vascular viability. Hemostasis was obtained with pinpoint electrocoagulation. The flap was mobilized into position and the pivotal anchor points positioned and stabilized with buried interrupted sutures. Subcutaneous and dermal tissues were closed in a multilayered fashion with sutures. Tissue redundancies were excised, and the epidermal edges were apposed without significant tension and sutured with sutures. Orbicularis Oris Muscle Flap Text: The defect edges were debeveled with a #15 scalpel blade.  Given that the defect affected the competency of the oral sphincter an obicularis oris muscle flap was deemed most appropriate to restore this competency and normal muscle function.  Using a sterile surgical marker, an appropriate flap was drawn incorporating the defect. The area thus outlined was incised with a #15 scalpel blade. Melolabial Transposition Flap Text: The defect edges were debeveled with a #15 scalpel blade.  Given the location of the defect and the proximity to free margins a melolabial flap was deemed most appropriate.  Using a sterile surgical marker, an appropriate melolabial transposition flap was drawn incorporating the defect.    The area thus outlined was incised deep to adipose tissue with a #15 scalpel blade.  The skin margins were undermined to an appropriate distance in all directions utilizing iris scissors. Rhombic Flap Text: The defect edges were debeveled with a #15 scalpel blade.  Given the location of the defect and the proximity to free margins a rhombic flap was deemed most appropriate.  Using a sterile surgical marker, an appropriate rhombic flap was drawn incorporating the defect.    The area thus outlined was incised deep to adipose tissue with a #15 scalpel blade.  The skin margins were undermined to an appropriate distance in all directions utilizing iris scissors. Rhomboid Transposition Flap Text: The defect edges were debeveled with a #15 scalpel blade.  Given the location of the defect and the proximity to free margins a rhomboid transposition flap was deemed most appropriate.  Using a sterile surgical marker, an appropriate rhomboid flap was drawn incorporating the defect.    The area thus outlined was incised deep to adipose tissue with a #15 scalpel blade.  The skin margins were undermined to an appropriate distance in all directions utilizing iris scissors. Bi-Rhombic Flap Text: The defect edges were debeveled with a #15 scalpel blade.  Given the location of the defect and the proximity to free margins a bi-rhombic flap was deemed most appropriate.  Using a sterile surgical marker, an appropriate rhombic flap was drawn incorporating the defect. The area thus outlined was incised deep to adipose tissue with a #15 scalpel blade.  The skin margins were undermined to an appropriate distance in all directions utilizing iris scissors. Helical Rim Advancement Flap Text: The defect edges were debeveled with a #15 blade scalpel.  Given the location of the defect and the proximity to free margins (helical rim) a double helical rim advancement flap was deemed most appropriate.  Using a sterile surgical marker, the appropriate advancement flaps were drawn incorporating the defect and placing the expected incisions between the helical rim and antihelix where possible.  The area thus outlined was incised through and through with a #15 scalpel blade.  With a skin hook and iris scissors, the flaps were gently and sharply undermined and freed up. Bilateral Helical Rim Advancement Flap Text: The defect edges were debeveled with a #15 blade scalpel.  Given the location of the defect and the proximity to free margins (helical rim) a bilateral helical rim advancement flap was deemed most appropriate.  Using a sterile surgical marker, the appropriate advancement flaps were drawn incorporating the defect and placing the expected incisions between the helical rim and antihelix where possible.  The area thus outlined was incised through and through with a #15 scalpel blade.  With a skin hook and iris scissors, the flaps were gently and sharply undermined and freed up. Ear Star Wedge Flap Text: The defect edges were debeveled with a #15 blade scalpel.  Given the location of the defect and the proximity to free margins (helical rim) an ear star wedge flap was deemed most appropriate.  Using a sterile surgical marker, the appropriate flap was drawn incorporating the defect and placing the expected incisions between the helical rim and antihelix where possible.  The area thus outlined was incised through and through with a #15 scalpel blade. Banner Transposition Flap Text: The defect edges were debeveled with a #15 scalpel blade.  Given the location of the defect and the proximity to free margins a Banner transposition flap was deemed most appropriate.  Using a sterile surgical marker, an appropriate flap drawn around the defect. The area thus outlined was incised deep to adipose tissue with a #15 scalpel blade.  The skin margins were undermined to an appropriate distance in all directions utilizing iris scissors. Bilobed Flap Text: The defect edges were debeveled with a #15 scalpel blade.  Given the location of the defect and the proximity to free margins a bilobe flap was deemed most appropriate.  Using a sterile surgical marker, an appropriate bilobe flap drawn around the defect.    The area thus outlined was incised deep to adipose tissue with a #15 scalpel blade.  The skin margins were undermined to an appropriate distance in all directions utilizing iris scissors. Bilobed Transposition Flap Text: The defect edges were debeveled with a #15 scalpel blade.  Given the location of the defect and the proximity to free margins a bilobed transposition flap was deemed most appropriate.  Using a sterile surgical marker, an appropriate bilobe flap drawn around the defect.    The area thus outlined was incised deep to adipose tissue with a #15 scalpel blade.  The skin margins were undermined to an appropriate distance in all directions utilizing iris scissors. Trilobed Flap Text: The defect edges were debeveled with a #15 scalpel blade.  Given the location of the defect and the proximity to free margins a trilobed flap was deemed most appropriate.  Using a sterile surgical marker, an appropriate trilobed flap drawn around the defect.    The area thus outlined was incised deep to adipose tissue with a #15 scalpel blade.  The skin margins were undermined to an appropriate distance in all directions utilizing iris scissors. Dorsal Nasal Flap Text: The defect edges were debeveled with a #15 scalpel blade.  Given the location of the defect and the proximity to free margins a dorsal nasal flap was deemed most appropriate.  Using a sterile surgical marker, an appropriate dorsal nasal flap was drawn around the defect.    The area thus outlined was incised deep to adipose tissue with a #15 scalpel blade.  The skin margins were undermined to an appropriate distance in all directions utilizing iris scissors. Island Pedicle Flap Text: The defect edges were debeveled with a #15 scalpel blade.  Given the location of the defect, shape of the defect and the proximity to free margins an island pedicle advancement flap was deemed most appropriate.  Using a sterile surgical marker, an appropriate advancement flap was drawn incorporating the defect, outlining the appropriate donor tissue and placing the expected incisions within the relaxed skin tension lines where possible.    The area thus outlined was incised deep to adipose tissue with a #15 scalpel blade.  The skin margins were undermined to an appropriate distance in all directions around the primary defect and laterally outward around the island pedicle utilizing iris scissors.  There was minimal undermining beneath the pedicle flap. Island Pedicle Flap With Canthal Suspension Text: The defect edges were debeveled with a #15 scalpel blade.  Given the location of the defect, shape of the defect and the proximity to free margins an island pedicle advancement flap was deemed most appropriate.  Using a sterile surgical marker, an appropriate advancement flap was drawn incorporating the defect, outlining the appropriate donor tissue and placing the expected incisions within the relaxed skin tension lines where possible. The area thus outlined was incised deep to adipose tissue with a #15 scalpel blade.  The skin margins were undermined to an appropriate distance in all directions around the primary defect and laterally outward around the island pedicle utilizing iris scissors.  There was minimal undermining beneath the pedicle flap. A suspension suture was placed in the canthal tendon to prevent tension and prevent ectropion. Alar Island Pedicle Flap Text: The defect edges were debeveled with a #15 scalpel blade.  Given the location of the defect, shape of the defect and the proximity to the alar rim an island pedicle advancement flap was deemed most appropriate.  Using a sterile surgical marker, an appropriate advancement flap was drawn incorporating the defect, outlining the appropriate donor tissue and placing the expected incisions within the nasal ala running parallel to the alar rim. The area thus outlined was incised with a #15 scalpel blade.  The skin margins were undermined minimally to an appropriate distance in all directions around the primary defect and laterally outward around the island pedicle utilizing iris scissors.  There was minimal undermining beneath the pedicle flap. Double Island Pedicle Flap Text: The defect edges were debeveled with a #15 scalpel blade.  Given the location of the defect, shape of the defect and the proximity to free margins a double island pedicle advancement flap was deemed most appropriate.  Using a sterile surgical marker, an appropriate advancement flap was drawn incorporating the defect, outlining the appropriate donor tissue and placing the expected incisions within the relaxed skin tension lines where possible.    The area thus outlined was incised deep to adipose tissue with a #15 scalpel blade.  The skin margins were undermined to an appropriate distance in all directions around the primary defect and laterally outward around the island pedicle utilizing iris scissors.  There was minimal undermining beneath the pedicle flap. Island Pedicle Flap-Requiring Vessel Identification Text: The defect edges were debeveled with a #15 scalpel blade.  Given the location of the defect, shape of the defect and the proximity to free margins an island pedicle advancement flap was deemed most appropriate.  Using a sterile surgical marker, an appropriate advancement flap was drawn, based on the axial vessel mentioned above, incorporating the defect, outlining the appropriate donor tissue and placing the expected incisions within the relaxed skin tension lines where possible.    The area thus outlined was incised deep to adipose tissue with a #15 scalpel blade.  The skin margins were undermined to an appropriate distance in all directions around the primary defect and laterally outward around the island pedicle utilizing iris scissors.  There was minimal undermining beneath the pedicle flap. Keystone Flap Text: The defect edges were debeveled with a #15 scalpel blade.  Given the location of the defect, shape of the defect a keystone flap was deemed most appropriate.  Using a sterile surgical marker, an appropriate keystone flap was drawn incorporating the defect, outlining the appropriate donor tissue and placing the expected incisions within the relaxed skin tension lines where possible. The area thus outlined was incised deep to adipose tissue with a #15 scalpel blade.  The skin margins were undermined to an appropriate distance in all directions around the primary defect and laterally outward around the flap utilizing iris scissors. O-T Plasty Text: The defect edges were debeveled with a #15 scalpel blade.  Given the location of the defect, shape of the defect and the proximity to free margins an O-T plasty was deemed most appropriate.  Using a sterile surgical marker, an appropriate O-T plasty was drawn incorporating the defect and placing the expected incisions within the relaxed skin tension lines where possible.    The area thus outlined was incised deep to adipose tissue with a #15 scalpel blade.  The skin margins were undermined to an appropriate distance in all directions utilizing iris scissors. O-Z Plasty Text: The defect edges were debeveled with a #15 scalpel blade.  Given the location of the defect, shape of the defect and the proximity to free margins an O-Z plasty (double transposition flap) was deemed most appropriate.  Using a sterile surgical marker, the appropriate transposition flaps were drawn incorporating the defect and placing the expected incisions within the relaxed skin tension lines where possible.    The area thus outlined was incised deep to adipose tissue with a #15 scalpel blade.  The skin margins were undermined to an appropriate distance in all directions utilizing iris scissors.  Hemostasis was achieved with electrocautery.  The flaps were then transposed into place, one clockwise and the other counterclockwise, and anchored with interrupted buried subcutaneous sutures. Double O-Z Plasty Text: The defect edges were debeveled with a #15 scalpel blade.  Given the location of the defect, shape of the defect and the proximity to free margins a Double O-Z plasty (double transposition flap) was deemed most appropriate.  Using a sterile surgical marker, the appropriate transposition flaps were drawn incorporating the defect and placing the expected incisions within the relaxed skin tension lines where possible. The area thus outlined was incised deep to adipose tissue with a #15 scalpel blade.  The skin margins were undermined to an appropriate distance in all directions utilizing iris scissors.  Hemostasis was achieved with electrocautery.  The flaps were then transposed into place, one clockwise and the other counterclockwise, and anchored with interrupted buried subcutaneous sutures. V-Y Plasty Text: The defect edges were debeveled with a #15 scalpel blade.  Given the location of the defect, shape of the defect and the proximity to free margins an V-Y advancement flap was deemed most appropriate.  Using a sterile surgical marker, an appropriate advancement flap was drawn incorporating the defect and placing the expected incisions within the relaxed skin tension lines where possible.    The area thus outlined was incised deep to adipose tissue with a #15 scalpel blade.  The skin margins were undermined to an appropriate distance in all directions utilizing iris scissors. H Plasty Text: Given the location of the defect, shape of the defect and the proximity to free margins a H-plasty was deemed most appropriate for repair.  Using a sterile surgical marker, the appropriate advancement arms of the H-plasty were drawn incorporating the defect and placing the expected incisions within the relaxed skin tension lines where possible. The area thus outlined was incised deep to adipose tissue with a #15 scalpel blade. The skin margins were undermined to an appropriate distance in all directions utilizing iris scissors.  The opposing advancement arms were then advanced into place in opposite direction and anchored with interrupted buried subcutaneous sutures. W Plasty Text: The lesion was extirpated to the level of the fat with a #15 scalpel blade.  Given the location of the defect, shape of the defect and the proximity to free margins a W-plasty was deemed most appropriate for repair.  Using a sterile surgical marker, the appropriate transposition arms of the W-plasty were drawn incorporating the defect and placing the expected incisions within the relaxed skin tension lines where possible.    The area thus outlined was incised deep to adipose tissue with a #15 scalpel blade.  The skin margins were undermined to an appropriate distance in all directions utilizing iris scissors.  The opposing transposition arms were then transposed into place in opposite direction and anchored with interrupted buried subcutaneous sutures. Z Plasty Text: The lesion was extirpated to the level of the fat with a #15 scalpel blade.  Given the location of the defect, shape of the defect and the proximity to free margins a Z-plasty was deemed most appropriate for repair.  Using a sterile surgical marker, the appropriate transposition arms of the Z-plasty were drawn incorporating the defect and placing the expected incisions within the relaxed skin tension lines where possible.    The area thus outlined was incised deep to adipose tissue with a #15 scalpel blade.  The skin margins were undermined to an appropriate distance in all directions utilizing iris scissors.  The opposing transposition arms were then transposed into place in opposite direction and anchored with interrupted buried subcutaneous sutures. Double Z Plasty Text: The lesion was extirpated to the level of the fat with a #15 scalpel blade. Given the location of the defect, shape of the defect and the proximity to free margins a double Z-plasty was deemed most appropriate for repair. Using a sterile surgical marker, the appropriate transposition arms of the double Z-plasty were drawn incorporating the defect and placing the expected incisions within the relaxed skin tension lines where possible. The area thus outlined was incised deep to adipose tissue with a #15 scalpel blade. The skin margins were undermined to an appropriate distance in all directions utilizing iris scissors. The opposing transposition arms were then transposed and carried over into place in opposite direction and anchored with interrupted buried subcutaneous sutures. Zygomaticofacial Flap Text: Given the location of the defect, shape of the defect and the proximity to free margins a zygomaticofacial flap was deemed most appropriate for repair.  Using a sterile surgical marker, the appropriate flap was drawn incorporating the defect and placing the expected incisions within the relaxed skin tension lines where possible. The area thus outlined was incised deep to adipose tissue with a #15 scalpel blade with preservation of a vascular pedicle.  The skin margins were undermined to an appropriate distance in all directions utilizing iris scissors.  The flap was then placed into the defect and anchored with interrupted buried subcutaneous sutures. Cheek Interpolation Flap Text: A decision was made to reconstruct the defect utilizing an interpolation axial flap and a staged reconstruction.  A telfa template was made of the defect.  This telfa template was then used to outline the Cheek Interpolation flap.  The donor area for the pedicle flap was then injected with anesthesia.  The flap was excised through the skin and subcutaneous tissue down to the layer of the underlying musculature.  The interpolation flap was carefully excised within this deep plane to maintain its blood supply.  The edges of the donor site were undermined.   The donor site was closed in a primary fashion.  The pedicle was then rotated into position and sutured.  Once the tube was sutured into place, adequate blood supply was confirmed with blanching and refill.  The pedicle was then wrapped with xeroform gauze and dressed appropriately with a telfa and gauze bandage to ensure continued blood supply and protect the attached pedicle. Cheek-To-Nose Interpolation Flap Text: A decision was made to reconstruct the defect utilizing an interpolation axial flap and a staged reconstruction.  A telfa template was made of the defect.  This telfa template was then used to outline the Cheek-To-Nose Interpolation flap.  The donor area for the pedicle flap was then injected with anesthesia.  The flap was excised through the skin and subcutaneous tissue down to the layer of the underlying musculature.  The interpolation flap was carefully excised within this deep plane to maintain its blood supply.  The edges of the donor site were undermined.   The donor site was closed in a primary fashion.  The pedicle was then rotated into position and sutured.  Once the tube was sutured into place, adequate blood supply was confirmed with blanching and refill.  The pedicle was then wrapped with xeroform gauze and dressed appropriately with a telfa and gauze bandage to ensure continued blood supply and protect the attached pedicle. Interpolation Flap Text: A decision was made to reconstruct the defect utilizing an interpolation axial flap and a staged reconstruction.  A telfa template was made of the defect.  This telfa template was then used to outline the interpolation flap.  The donor area for the pedicle flap was then injected with anesthesia.  The flap was excised through the skin and subcutaneous tissue down to the layer of the underlying musculature.  The interpolation flap was carefully excised within this deep plane to maintain its blood supply.  The edges of the donor site were undermined.   The donor site was closed in a primary fashion.  The pedicle was then rotated into position and sutured.  Once the tube was sutured into place, adequate blood supply was confirmed with blanching and refill.  The pedicle was then wrapped with xeroform gauze and dressed appropriately with a telfa and gauze bandage to ensure continued blood supply and protect the attached pedicle. Melolabial Interpolation Flap Text: A decision was made to reconstruct the defect utilizing an interpolation axial flap and a staged reconstruction.  A telfa template was made of the defect.  This telfa template was then used to outline the melolabial interpolation flap.  The donor area for the pedicle flap was then injected with anesthesia.  The flap was excised through the skin and subcutaneous tissue down to the layer of the underlying musculature.  The pedicle flap was carefully excised within this deep plane to maintain its blood supply.  The edges of the donor site were undermined.   The donor site was closed in a primary fashion.  The pedicle was then rotated into position and sutured.  Once the tube was sutured into place, adequate blood supply was confirmed with blanching and refill.  The pedicle was then wrapped with xeroform gauze and dressed appropriately with a telfa and gauze bandage to ensure continued blood supply and protect the attached pedicle. Mastoid Interpolation Flap Text: A decision was made to reconstruct the defect utilizing an interpolation axial flap and a staged reconstruction.  A telfa template was made of the defect.  This telfa template was then used to outline the mastoid interpolation flap.  The donor area for the pedicle flap was then injected with anesthesia.  The flap was excised through the skin and subcutaneous tissue down to the layer of the underlying musculature.  The pedicle flap was carefully excised within this deep plane to maintain its blood supply.  The edges of the donor site were undermined.   The donor site was closed in a primary fashion.  The pedicle was then rotated into position and sutured.  Once the tube was sutured into place, adequate blood supply was confirmed with blanching and refill.  The pedicle was then wrapped with xeroform gauze and dressed appropriately with a telfa and gauze bandage to ensure continued blood supply and protect the attached pedicle. Posterior Auricular Interpolation Flap Text: A decision was made to reconstruct the defect utilizing an interpolation axial flap and a staged reconstruction.  A telfa template was made of the defect.  This telfa template was then used to outline the posterior auricular interpolation flap.  The donor area for the pedicle flap was then injected with anesthesia.  The flap was excised through the skin and subcutaneous tissue down to the layer of the underlying musculature.  The pedicle flap was carefully excised within this deep plane to maintain its blood supply.  The edges of the donor site were undermined.   The donor site was closed in a primary fashion.  The pedicle was then rotated into position and sutured.  Once the tube was sutured into place, adequate blood supply was confirmed with blanching and refill.  The pedicle was then wrapped with xeroform gauze and dressed appropriately with a telfa and gauze bandage to ensure continued blood supply and protect the attached pedicle. Paramedian Forehead Flap Text: A decision was made to reconstruct the defect utilizing an interpolation axial flap and a staged reconstruction.  A telfa template was made of the defect.  This telfa template was then used to outline the paramedian forehead pedicle flap.  The donor area for the pedicle flap was then injected with anesthesia.  The flap was excised through the skin and subcutaneous tissue down to the layer of the underlying musculature.  The pedicle flap was carefully excised within this deep plane to maintain its blood supply.  The edges of the donor site were undermined.   The donor site was closed in a primary fashion.  The pedicle was then rotated into position and sutured.  Once the tube was sutured into place, adequate blood supply was confirmed with blanching and refill.  The pedicle was then wrapped with xeroform gauze and dressed appropriately with a telfa and gauze bandage to ensure continued blood supply and protect the attached pedicle. Abbe Flap (Upper To Lower Lip) Text: The defect of the lower lip was assessed and measured.  Given the location and size of the defect, an Abbe flap was deemed most appropriate.  Using a sterile surgical marker, an appropriate Abbe flap was measured and drawn on the upper lip. Local anesthesia was then infiltrated.  A scalpel was then used to incise the upper lip through and through the skin, vermilion, muscle and mucosa, leaving the flap pedicled on the opposite side.  The flap was then rotated and transferred to the lower lip defect.  The flap was then sutured into place with a three layer technique, closing the orbicularis oris muscle layer with subcutaneous buried sutures, followed by a mucosal layer and an epidermal layer. Abbe Flap (Lower To Upper Lip) Text: The defect of the upper lip was assessed and measured.  Given the location and size of the defect, an Abbe flap was deemed most appropriate.  Using a sterile surgical marker, an appropriate Abbe flap was measured and drawn on the lower lip. Local anesthesia was then infiltrated. A scalpel was then used to incise the upper lip through and through the skin, vermilion, muscle and mucosa, leaving the flap pedicled on the opposite side.  The flap was then rotated and transferred to the lower lip defect.  The flap was then sutured into place with a three layer technique, closing the orbicularis oris muscle layer with subcutaneous buried sutures, followed by a mucosal layer and an epidermal layer. Estlander Flap (Upper To Lower Lip) Text: The defect of the lower lip was assessed and measured.  Given the location and size of the defect, an Estlander flap was deemed most appropriate.  Using a sterile surgical marker, an appropriate Estlander flap was measured and drawn on the upper lip. Local anesthesia was then infiltrated. A scalpel was then used to incise the lateral aspect of the flap, through skin, muscle and mucosa, leaving the flap pedicled medially.  The flap was then rotated and positioned to fill the lower lip defect.  The flap was then sutured into place with a three layer technique, closing the orbicularis oris muscle layer with subcutaneous buried sutures, followed by a mucosal layer and an epidermal layer. Lip Wedge Excision Repair Text: Given the location of the defect and the proximity to free margins a full thickness wedge repair was deemed most appropriate.  Using a sterile surgical marker, the appropriate repair was drawn incorporating the defect and placing the expected incisions perpendicular to the vermillion border.  The vermillion border was also meticulously outlined to ensure appropriate reapproximation during the repair.  The area thus outlined was incised through and through with a #15 scalpel blade.  The muscularis and dermis were reaproximated with deep sutures following hemostasis. Care was taken to realign the vermillion border before proceeding with the superficial closure.  Once the vermillion was realigned the superfical and mucosal closure was finished. Ftsg Text: The defect edges were debeveled with a #15 scalpel blade.  Given the location of the defect, shape of the defect and the proximity to free margins a full thickness skin graft was deemed most appropriate.  Using a sterile surgical marker, the primary defect shape was transferred to the donor site. The area thus outlined was incised deep to adipose tissue with a #15 scalpel blade.  The harvested graft was then trimmed of adipose tissue until only dermis and epidermis was left.  The skin margins of the secondary defect were undermined to an appropriate distance in all directions utilizing iris scissors.  The secondary defect was closed with interrupted buried subcutaneous sutures.  The skin edges were then re-apposed with running  sutures.  The skin graft was then placed in the primary defect and oriented appropriately. Split-Thickness Skin Graft Text: The defect edges were debeveled with a #15 scalpel blade.  Given the location of the defect, shape of the defect and the proximity to free margins a split thickness skin graft was deemed most appropriate.  Using a sterile surgical marker, the primary defect shape was transferred to the donor site. The split thickness graft was then harvested.  The skin graft was then placed in the primary defect and oriented appropriately. Pinch Graft Text: The defect edges were debeveled with a #15 scalpel blade. Given the location of the defect, shape of the defect and the proximity to free margins a pinch graft was deemed most appropriate. Using a sterile surgical marker, the primary defect shape was transferred to the donor site. The area thus outlined was incised deep to adipose tissue with a #15 scalpel blade.  The harvested graft was then trimmed of adipose tissue until only dermis and epidermis was left. The skin margins of the secondary defect were undermined to an appropriate distance in all directions utilizing iris scissors.  The secondary defect was closed with interrupted buried subcutaneous sutures.  The skin edges were then re-apposed with running  sutures.  The skin graft was then placed in the primary defect and oriented appropriately. Burow's Graft Text: The defect edges were debeveled with a #15 scalpel blade.  Given the location of the defect, shape of the defect, the proximity to free margins and the presence of a standing cone deformity a Burow's skin graft was deemed most appropriate. The standing cone was removed and this tissue was then trimmed to the shape of the primary defect. The adipose tissue was also removed until only dermis and epidermis were left.  The skin margins of the secondary defect were undermined to an appropriate distance in all directions utilizing iris scissors.  The secondary defect was closed with interrupted buried subcutaneous sutures.  The skin edges were then re-apposed with running  sutures.  The skin graft was then placed in the primary defect and oriented appropriately. Cartilage Graft Text: The defect edges were debeveled with a #15 scalpel blade.  Given the location of the defect, shape of the defect, the fact the defect involved a full thickness cartilage defect a cartilage graft was deemed most appropriate.  An appropriate donor site was identified, cleansed, and anesthetized. The cartilage graft was then harvested and transferred to the recipient site, oriented appropriately and then sutured into place.  The secondary defect was then repaired using a primary closure. Composite Graft Text: The defect edges were debeveled with a #15 scalpel blade.  Given the location of the defect, shape of the defect, the proximity to free margins and the fact the defect was full thickness a composite graft was deemed most appropriate.  The defect was outline and then transferred to the donor site.  A full thickness graft was then excised from the donor site. The graft was then placed in the primary defect, oriented appropriately and then sutured into place.  The secondary defect was then repaired using a primary closure. Epidermal Autograft Text: The defect edges were debeveled with a #15 scalpel blade.  Given the location of the defect, shape of the defect and the proximity to free margins an epidermal autograft was deemed most appropriate.  Using a sterile surgical marker, the primary defect shape was transferred to the donor site. The epidermal graft was then harvested.  The skin graft was then placed in the primary defect and oriented appropriately. Dermal Autograft Text: The defect edges were debeveled with a #15 scalpel blade.  Given the location of the defect, shape of the defect and the proximity to free margins a dermal autograft was deemed most appropriate.  Using a sterile surgical marker, the primary defect shape was transferred to the donor site. The area thus outlined was incised deep to adipose tissue with a #15 scalpel blade.  The harvested graft was then trimmed of adipose and epidermal tissue until only dermis was left.  The skin graft was then placed in the primary defect and oriented appropriately. Skin Substitute Text: The defect edges were debeveled with a #15 scalpel blade.  Given the location of the defect, shape of the defect and the proximity to free margins a skin substitute graft was deemed most appropriate.  The graft material was trimmed to fit the size of the defect. The graft was then placed in the primary defect and oriented appropriately. Tissue Cultured Epidermal Autograft Text: The defect edges were debeveled with a #15 scalpel blade.  Given the location of the defect, shape of the defect and the proximity to free margins a tissue cultured epidermal autograft was deemed most appropriate.  The graft was then trimmed to fit the size of the defect.  The graft was then placed in the primary defect and oriented appropriately. Xenograft Text: The defect edges were debeveled with a #15 scalpel blade.  Given the location of the defect, shape of the defect and the proximity to free margins a xenograft was deemed most appropriate.  The graft was then trimmed to fit the size of the defect.  The graft was then placed in the primary defect and oriented appropriately. Purse String (Intermediate) Text: Given the location of the defect and the characteristics of the surrounding skin a pursestring intermediate closure was deemed most appropriate.  Undermining was performed circumfirentially around the surgical defect.  A purstring suture was then placed and tightened. Purse String (Simple) Text: Given the location of the defect and the characteristics of the surrounding skin a purse string simple closure was deemed most appropriate.  Undermining was performed circumferentially around the surgical defect.  A purse string suture was then placed and tightened. Partial Purse String (Intermediate) Text: Given the location of the defect and the characteristics of the surrounding skin an intermediate purse string closure was deemed most appropriate.  Undermining was performed circumferentially around the surgical defect.  A purse string suture was then placed and tightened. Wound tension of the circular defect prevented complete closure of the wound. Partial Purse String (Simple) Text: Given the location of the defect and the characteristics of the surrounding skin a simple purse string closure was deemed most appropriate.  Undermining was performed circumferentially around the surgical defect.  A purse string suture was then placed and tightened. Wound tension of the circular defect prevented complete closure of the wound. Complex Repair And Single Advancement Flap Text: The defect edges were debeveled with a #15 scalpel blade.  The primary defect was closed partially with a complex linear closure.  Given the location of the remaining defect, shape of the defect and the proximity to free margins a single advancement flap was deemed most appropriate for complete closure of the defect.  Using a sterile surgical marker, an appropriate advancement flap was drawn incorporating the defect and placing the expected incisions within the relaxed skin tension lines where possible.    The area thus outlined was incised deep to adipose tissue with a #15 scalpel blade.  The skin margins were undermined to an appropriate distance in all directions utilizing iris scissors. Complex Repair And Double Advancement Flap Text: The defect edges were debeveled with a #15 scalpel blade.  The primary defect was closed partially with a complex linear closure.  Given the location of the remaining defect, shape of the defect and the proximity to free margins a double advancement flap was deemed most appropriate for complete closure of the defect.  Using a sterile surgical marker, an appropriate advancement flap was drawn incorporating the defect and placing the expected incisions within the relaxed skin tension lines where possible.    The area thus outlined was incised deep to adipose tissue with a #15 scalpel blade.  The skin margins were undermined to an appropriate distance in all directions utilizing iris scissors. Complex Repair And Modified Advancement Flap Text: The defect edges were debeveled with a #15 scalpel blade.  The primary defect was closed partially with a complex linear closure.  Given the location of the remaining defect, shape of the defect and the proximity to free margins a modified advancement flap was deemed most appropriate for complete closure of the defect.  Using a sterile surgical marker, an appropriate advancement flap was drawn incorporating the defect and placing the expected incisions within the relaxed skin tension lines where possible.    The area thus outlined was incised deep to adipose tissue with a #15 scalpel blade.  The skin margins were undermined to an appropriate distance in all directions utilizing iris scissors. Complex Repair And A-T Advancement Flap Text: The defect edges were debeveled with a #15 scalpel blade.  The primary defect was closed partially with a complex linear closure.  Given the location of the remaining defect, shape of the defect and the proximity to free margins an A-T advancement flap was deemed most appropriate for complete closure of the defect.  Using a sterile surgical marker, an appropriate advancement flap was drawn incorporating the defect and placing the expected incisions within the relaxed skin tension lines where possible.    The area thus outlined was incised deep to adipose tissue with a #15 scalpel blade.  The skin margins were undermined to an appropriate distance in all directions utilizing iris scissors. Complex Repair And O-T Advancement Flap Text: The defect edges were debeveled with a #15 scalpel blade.  The primary defect was closed partially with a complex linear closure.  Given the location of the remaining defect, shape of the defect and the proximity to free margins an O-T advancement flap was deemed most appropriate for complete closure of the defect.  Using a sterile surgical marker, an appropriate advancement flap was drawn incorporating the defect and placing the expected incisions within the relaxed skin tension lines where possible.    The area thus outlined was incised deep to adipose tissue with a #15 scalpel blade.  The skin margins were undermined to an appropriate distance in all directions utilizing iris scissors. Complex Repair And O-L Flap Text: The defect edges were debeveled with a #15 scalpel blade.  The primary defect was closed partially with a complex linear closure.  Given the location of the remaining defect, shape of the defect and the proximity to free margins an O-L flap was deemed most appropriate for complete closure of the defect.  Using a sterile surgical marker, an appropriate flap was drawn incorporating the defect and placing the expected incisions within the relaxed skin tension lines where possible.    The area thus outlined was incised deep to adipose tissue with a #15 scalpel blade.  The skin margins were undermined to an appropriate distance in all directions utilizing iris scissors. Complex Repair And Bilobe Flap Text: The defect edges were debeveled with a #15 scalpel blade.  The primary defect was closed partially with a complex linear closure.  Given the location of the remaining defect, shape of the defect and the proximity to free margins a bilobe flap was deemed most appropriate for complete closure of the defect.  Using a sterile surgical marker, an appropriate advancement flap was drawn incorporating the defect and placing the expected incisions within the relaxed skin tension lines where possible.    The area thus outlined was incised deep to adipose tissue with a #15 scalpel blade.  The skin margins were undermined to an appropriate distance in all directions utilizing iris scissors. Complex Repair And Melolabial Flap Text: The defect edges were debeveled with a #15 scalpel blade.  The primary defect was closed partially with a complex linear closure.  Given the location of the remaining defect, shape of the defect and the proximity to free margins a melolabial flap was deemed most appropriate for complete closure of the defect.  Using a sterile surgical marker, an appropriate advancement flap was drawn incorporating the defect and placing the expected incisions within the relaxed skin tension lines where possible.    The area thus outlined was incised deep to adipose tissue with a #15 scalpel blade.  The skin margins were undermined to an appropriate distance in all directions utilizing iris scissors. Complex Repair And Rotation Flap Text: The defect edges were debeveled with a #15 scalpel blade.  The primary defect was closed partially with a complex linear closure.  Given the location of the remaining defect, shape of the defect and the proximity to free margins a rotation flap was deemed most appropriate for complete closure of the defect.  Using a sterile surgical marker, an appropriate advancement flap was drawn incorporating the defect and placing the expected incisions within the relaxed skin tension lines where possible.    The area thus outlined was incised deep to adipose tissue with a #15 scalpel blade.  The skin margins were undermined to an appropriate distance in all directions utilizing iris scissors. Complex Repair And Rhombic Flap Text: The defect edges were debeveled with a #15 scalpel blade.  The primary defect was closed partially with a complex linear closure.  Given the location of the remaining defect, shape of the defect and the proximity to free margins a rhombic flap was deemed most appropriate for complete closure of the defect.  Using a sterile surgical marker, an appropriate advancement flap was drawn incorporating the defect and placing the expected incisions within the relaxed skin tension lines where possible.    The area thus outlined was incised deep to adipose tissue with a #15 scalpel blade.  The skin margins were undermined to an appropriate distance in all directions utilizing iris scissors. Complex Repair And Transposition Flap Text: The defect edges were debeveled with a #15 scalpel blade.  The primary defect was closed partially with a complex linear closure.  Given the location of the remaining defect, shape of the defect and the proximity to free margins a transposition flap was deemed most appropriate for complete closure of the defect.  Using a sterile surgical marker, an appropriate advancement flap was drawn incorporating the defect and placing the expected incisions within the relaxed skin tension lines where possible.    The area thus outlined was incised deep to adipose tissue with a #15 scalpel blade.  The skin margins were undermined to an appropriate distance in all directions utilizing iris scissors. Complex Repair And V-Y Plasty Text: The defect edges were debeveled with a #15 scalpel blade.  The primary defect was closed partially with a complex linear closure.  Given the location of the remaining defect, shape of the defect and the proximity to free margins a V-Y plasty was deemed most appropriate for complete closure of the defect.  Using a sterile surgical marker, an appropriate advancement flap was drawn incorporating the defect and placing the expected incisions within the relaxed skin tension lines where possible.    The area thus outlined was incised deep to adipose tissue with a #15 scalpel blade.  The skin margins were undermined to an appropriate distance in all directions utilizing iris scissors. Complex Repair And M Plasty Text: The defect edges were debeveled with a #15 scalpel blade.  The primary defect was closed partially with a complex linear closure.  Given the location of the remaining defect, shape of the defect and the proximity to free margins an M plasty was deemed most appropriate for complete closure of the defect.  Using a sterile surgical marker, an appropriate advancement flap was drawn incorporating the defect and placing the expected incisions within the relaxed skin tension lines where possible.    The area thus outlined was incised deep to adipose tissue with a #15 scalpel blade.  The skin margins were undermined to an appropriate distance in all directions utilizing iris scissors. Complex Repair And Double M Plasty Text: The defect edges were debeveled with a #15 scalpel blade.  The primary defect was closed partially with a complex linear closure.  Given the location of the remaining defect, shape of the defect and the proximity to free margins a double M plasty was deemed most appropriate for complete closure of the defect.  Using a sterile surgical marker, an appropriate advancement flap was drawn incorporating the defect and placing the expected incisions within the relaxed skin tension lines where possible.    The area thus outlined was incised deep to adipose tissue with a #15 scalpel blade.  The skin margins were undermined to an appropriate distance in all directions utilizing iris scissors. Complex Repair And W Plasty Text: The defect edges were debeveled with a #15 scalpel blade.  The primary defect was closed partially with a complex linear closure.  Given the location of the remaining defect, shape of the defect and the proximity to free margins a W plasty was deemed most appropriate for complete closure of the defect.  Using a sterile surgical marker, an appropriate advancement flap was drawn incorporating the defect and placing the expected incisions within the relaxed skin tension lines where possible.    The area thus outlined was incised deep to adipose tissue with a #15 scalpel blade.  The skin margins were undermined to an appropriate distance in all directions utilizing iris scissors. Complex Repair And Z Plasty Text: The defect edges were debeveled with a #15 scalpel blade.  The primary defect was closed partially with a complex linear closure.  Given the location of the remaining defect, shape of the defect and the proximity to free margins a Z plasty was deemed most appropriate for complete closure of the defect.  Using a sterile surgical marker, an appropriate advancement flap was drawn incorporating the defect and placing the expected incisions within the relaxed skin tension lines where possible.    The area thus outlined was incised deep to adipose tissue with a #15 scalpel blade.  The skin margins were undermined to an appropriate distance in all directions utilizing iris scissors. Complex Repair And Dorsal Nasal Flap Text: The defect edges were debeveled with a #15 scalpel blade.  The primary defect was closed partially with a complex linear closure.  Given the location of the remaining defect, shape of the defect and the proximity to free margins a dorsal nasal flap was deemed most appropriate for complete closure of the defect.  Using a sterile surgical marker, an appropriate flap was drawn incorporating the defect and placing the expected incisions within the relaxed skin tension lines where possible.    The area thus outlined was incised deep to adipose tissue with a #15 scalpel blade.  The skin margins were undermined to an appropriate distance in all directions utilizing iris scissors. Complex Repair And Ftsg Text: The defect edges were debeveled with a #15 scalpel blade.  The primary defect was closed partially with a complex linear closure.  Given the location of the defect, shape of the defect and the proximity to free margins a full thickness skin graft was deemed most appropriate to repair the remaining defect.  The graft was trimmed to fit the size of the remaining defect.  The graft was then placed in the primary defect, oriented appropriately, and sutured into place. Complex Repair And Burow's Graft Text: The defect edges were debeveled with a #15 scalpel blade.  The primary defect was closed partially with a complex linear closure.  Given the location of the defect, shape of the defect, the proximity to free margins and the presence of a standing cone deformity a Burow's graft was deemed most appropriate to repair the remaining defect.  The graft was trimmed to fit the size of the remaining defect.  The graft was then placed in the primary defect, oriented appropriately, and sutured into place. Complex Repair And Split-Thickness Skin Graft Text: The defect edges were debeveled with a #15 scalpel blade.  The primary defect was closed partially with a complex linear closure.  Given the location of the defect, shape of the defect and the proximity to free margins a split thickness skin graft was deemed most appropriate to repair the remaining defect.  The graft was trimmed to fit the size of the remaining defect.  The graft was then placed in the primary defect, oriented appropriately, and sutured into place. Complex Repair And Epidermal Autograft Text: The defect edges were debeveled with a #15 scalpel blade.  The primary defect was closed partially with a complex linear closure.  Given the location of the defect, shape of the defect and the proximity to free margins an epidermal autograft was deemed most appropriate to repair the remaining defect.  The graft was trimmed to fit the size of the remaining defect.  The graft was then placed in the primary defect, oriented appropriately, and sutured into place. Complex Repair And Dermal Autograft Text: The defect edges were debeveled with a #15 scalpel blade.  The primary defect was closed partially with a complex linear closure.  Given the location of the defect, shape of the defect and the proximity to free margins an dermal autograft was deemed most appropriate to repair the remaining defect.  The graft was trimmed to fit the size of the remaining defect.  The graft was then placed in the primary defect, oriented appropriately, and sutured into place. Complex Repair And Tissue Cultured Epidermal Autograft Text: The defect edges were debeveled with a #15 scalpel blade.  The primary defect was closed partially with a complex linear closure.  Given the location of the defect, shape of the defect and the proximity to free margins an tissue cultured epidermal autograft was deemed most appropriate to repair the remaining defect.  The graft was trimmed to fit the size of the remaining defect.  The graft was then placed in the primary defect, oriented appropriately, and sutured into place. Complex Repair And Skin Substitute Graft Text: The defect edges were debeveled with a #15 scalpel blade.  The primary defect was closed partially with a complex linear closure.  Given the location of the remaining defect, shape of the defect and the proximity to free margins a skin substitute graft was deemed most appropriate to repair the remaining defect.  The graft was trimmed to fit the size of the remaining defect.  The graft was then placed in the primary defect, oriented appropriately, and sutured into place. Path Notes (To The Dermatopathologist): Please check margins Consent was obtained from the patient. The risks and benefits to therapy were discussed in detail. Specifically, the risks of infection, scarring, bleeding, prolonged wound healing, incomplete removal. Prior to the procedure, the treatment site was clearly identified and confirmed by the patient. Post-Care Instructions: I reviewed with the patient in detail post-care instructions. Patient is not to engage in any heavy lifting, exercise, or swimming for the next 14 days. Should the patient develop any fevers, chills, bleeding, severe pain patient will contact the office immediately. Where Do You Want The Question To Include Opioid Counseling Located?: Case Summary Tab Information: Selecting Yes will display possible errors in your note based on the variables you have selected. This validation is only offered as a suggestion for you. PLEASE NOTE THAT THE VALIDATION TEXT WILL BE REMOVED WHEN YOU FINALIZE YOUR NOTE. IF YOU WANT TO FAX A PRELIMINARY NOTE YOU WILL NEED TO TOGGLE THIS TO 'NO' IF YOU DO NOT WANT IT IN YOUR FAXED NOTE.

## 2024-11-25 NOTE — PROGRESS NOTE ADULT - ATTENDING COMMENTS
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 Post op course complicated by AF RVR, hypoxic respiratory failure.   S/p extubated, reintubated for worsening of resp distress    More awake off precedex for concern of fever, on low dose Prop and prn Dilaudid. Ativan taper for possible benzo withdrawal until 11/27. Keppra 500 mg BID.   Good gas exchange, CXR no acute finding, possible SBT after CT  Not on pressure, Lopressor dose adjusted for a fib with RVR, On Dig/dig level  Tolerating tube feed with resolving colonic inertia, continue bowel regimen.  LFT down trending, normal. Hepatic US shows good flow.  Renal function improving, diuresing well on IV Lasix. On Finasteride for retention, on low dose IVF Plasmalyte.  Abx Linezolid Christopher. Worsening leucocytosis concerning. Pt on systemic steroid also.  For CT H/C/A/P  ISS/NPH dose adjustment    Pharm DVT ppx SQH    Anti rej: steroid taper, cyclosporine, cyclosporine level  PPx abx: Valcyte, Fluc, Bactrim on hold    Family kept updated

## 2024-11-25 NOTE — PROGRESS NOTE ADULT - ASSESSMENT
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 Pt underwent Y90 Sept, 2023 with favorable treatment response. Pt completed OLTx evaluation and listed for OLTx 5/03/2024. Now s/p OLT on 11/15/24. EP consulted for post-op Afib with RVR. Rates better off levo (on phenyphrine), post dig load, and on metoprolol ATC.     Plan  - digoxin .125 mg IV daily  - continue metoprolol  - once "cleared" for anticoagulation can revisit a strategy of rhythm control  - discussed with SICU team.     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 Pt underwent Y90 Sept, 2023 with favorable treatment response. Pt completed OLTx evaluation and listed for OLTx 5/03/2024. Now s/p OLT on 11/15/24. EP consulted for post-op Afib with RVR. Rates better off levo (on phenyphrine), post dig load, and on metoprolol ATC.     Plan  - digoxin .125 mg IV daily  - continue metoprolol  - once "cleared" for anticoagulation can revisit a strategy of rhythm control  - EP will sign off now. Reconsult when pt able to be on full anticoagulation to consider rhythm control with AYDEN/DCCV.   - discussed with SICU team.     THELMA Francis NP-C  [phone]

## 2024-11-25 NOTE — PROGRESS NOTE ADULT - SUBJECTIVE AND OBJECTIVE BOX
24H hour events:     MEDICATIONS:  aspirin  chewable 81 milliGRAM(s) Enteral Tube daily  digoxin  Injectable 125 MICROGram(s) IV Push daily  heparin   Injectable 5000 Unit(s) SubCutaneous every 12 hours  metoprolol tartrate 50 milliGRAM(s) Enteral Tube every 8 hours    fluconAZOLE IVPB 400 milliGRAM(s) IV Intermittent every 24 hours  linezolid  IVPB      linezolid  IVPB 600 milliGRAM(s) IV Intermittent every 12 hours  meropenem  IVPB 1000 milliGRAM(s) IV Intermittent every 8 hours  valGANciclovir 50 mG/mL Oral Solution 450 milliGRAM(s) Oral <User Schedule>    albuterol/ipratropium for Nebulization 3 milliLiter(s) Nebulizer every 6 hours  buDESOnide    Inhalation Suspension 0.5 milliGRAM(s) Inhalation two times a day  sodium chloride 3%  Inhalation 4 milliLiter(s) Inhalation every 6 hours    HYDROmorphone  Injectable 1 milliGRAM(s) IV Push every 3 hours PRN  levETIRAcetam  IVPB 500 milliGRAM(s) IV Intermittent every 12 hours  LORazepam   Injectable 1 milliGRAM(s) IV Push every 12 hours  propofol Infusion 5 MICROgram(s)/kG/Min IV Continuous <Continuous>    pantoprazole  Injectable 40 milliGRAM(s) IV Push daily  polyethylene glycol 3350 17 Gram(s) Oral every 24 hours  senna Syrup 10 milliLiter(s) Oral every 24 hours  ursodiol Suspension 300 milliGRAM(s) Oral every 12 hours    finasteride 5 milliGRAM(s) Oral daily  insulin lispro (ADMELOG) corrective regimen sliding scale   SubCutaneous every 6 hours  insulin NPH human recombinant 6 Unit(s) SubCutaneous every 6 hours  predniSONE   Tablet 20 milliGRAM(s) Oral daily    calcium carbonate 1250 mG  + Vitamin D (OsCal 500 + D) 1 Tablet(s) Oral every 12 hours  chlorhexidine 0.12% Liquid 15 milliLiter(s) Oral Mucosa every 12 hours  chlorhexidine 2% Cloths 1 Application(s) Topical <User Schedule>  cycloSPORINE  , modified (GENGRAF) Solution 50 milliGRAM(s) Enteral Tube <User Schedule>  multiple electrolytes Injection Type 1 1000 milliLiter(s) IV Continuous <Continuous>      REVIEW OF SYSTEMS:  Complete 12 point ROS negative.    PHYSICAL EXAM:  T(C): 37.5 (11-25-24 @ 10:00), Max: 97.9 (11-24-24 @ 15:00)  HR: 102 (11-25-24 @ 10:00) (68 - 152)  BP: 134/65 (11-25-24 @ 01:00) (75/50 - 158/69)  RR: 19 (11-25-24 @ 10:00) (9 - 40)  SpO2: 100% (11-25-24 @ 10:00) (90% - 100%)  Wt(kg): --  I&O's Summary    24 Nov 2024 07:01  -  25 Nov 2024 07:00  --------------------------------------------------------  IN: 2942.5 mL / OUT: 2435 mL / NET: 507.5 mL    25 Nov 2024 07:01  -  25 Nov 2024 10:08  --------------------------------------------------------  IN: 158.4 mL / OUT: 335 mL / NET: -176.6 mL        Appearance: Normal	  HEENT: PERRL, EOMI	  Cardiovascular: Normal S1 S2, No JVD, No murmurs  Respiratory: Lungs clear to auscultation	  Psychiatry: A & O x 3, Mood & affect appropriate  Gastrointestinal:  Soft, Non-tender, + BS	  Skin: No rashes, No ecchymoses, No cyanosis	  Neurologic: Grossly intact  Extremities: No clubbing, cyanosis or edema  Vascular: Peripheral pulses palpable 2+ bilaterally        LABS:	 	    CBC Full  -  ( 25 Nov 2024 05:44 )  WBC Count : 22.27 K/uL  Hemoglobin : 11.4 g/dL  Hematocrit : 37.6 %  Platelet Count - Automated : 261 K/uL  Mean Cell Volume : 84.7 fl  Mean Cell Hemoglobin : 25.7 pg  Mean Cell Hemoglobin Concentration : 30.3 g/dL  Auto Neutrophil # : x  Auto Lymphocyte # : x  Auto Monocyte # : x  Auto Eosinophil # : x  Auto Basophil # : x  Auto Neutrophil % : x  Auto Lymphocyte % : x  Auto Monocyte % : x  Auto Eosinophil % : x  Auto Basophil % : x    11-25    143  |  109[H]  |  56[H]  ----------------------------<  177[H]  4.0   |  22  |  1.49[H]  11-24    144  |  110[H]  |  55[H]  ----------------------------<  151[H]  4.2   |  22  |  1.44[H]    Ca    7.9[L]      25 Nov 2024 05:44  Ca    7.6[L]      24 Nov 2024 22:56  Phos  3.7     11-25  Phos  4.5     11-24  Mg     2.4     11-25  Mg     2.3     11-24    TPro  4.6[L]  /  Alb  2.6[L]  /  TBili  0.9  /  DBili  x   /  AST  40  /  ALT  43  /  AlkPhos  93  11-25  TPro  4.7[L]  /  Alb  2.7[L]  /  TBili  0.7  /  DBili  x   /  AST  30  /  ALT  42  /  AlkPhos  65  11-24      proBNP:   Lipid Profile:   HgA1c:   TSH:       CARDIAC MARKERS:          TELEMETRY: 	    ECG:  	  RADIOLOGY:  OTHER: 	    PREVIOUS DIAGNOSTIC TESTING:    [ ] Echocardiogram:    [ ]  Catheterization:  [ ] Stress Test:  	  	  ASSESSMENT/PLAN: 	     24H hour events: Remain stated and intubated. Tele: Afib with VHR currently in the 100's, range 100-120's (briefly up to 140's at times)     MEDICATIONS:  aspirin  chewable 81 milliGRAM(s) Enteral Tube daily  digoxin  Injectable 125 MICROGram(s) IV Push daily  heparin   Injectable 5000 Unit(s) SubCutaneous every 12 hours  metoprolol tartrate 50 milliGRAM(s) Enteral Tube every 8 hours  fluconAZOLE IVPB 400 milliGRAM(s) IV Intermittent every 24 hours     linezolid  IVPB 600 milliGRAM(s) IV Intermittent every 12 hours  meropenem  IVPB 1000 milliGRAM(s) IV Intermittent every 8 hours  valGANciclovir 50 mG/mL Oral Solution 450 milliGRAM(s) Oral <User Schedule>  albuterol/ipratropium for Nebulization 3 milliLiter(s) Nebulizer every 6 hours  buDESOnide    Inhalation Suspension 0.5 milliGRAM(s) Inhalation two times a day  sodium chloride 3%  Inhalation 4 milliLiter(s) Inhalation every 6 hours  HYDROmorphone  Injectable 1 milliGRAM(s) IV Push every 3 hours PRN  levETIRAcetam  IVPB 500 milliGRAM(s) IV Intermittent every 12 hours  LORazepam   Injectable 1 milliGRAM(s) IV Push every 12 hours  propofol Infusion 5 MICROgram(s)/kG/Min IV Continuous <Continuous>  pantoprazole  Injectable 40 milliGRAM(s) IV Push daily  polyethylene glycol 3350 17 Gram(s) Oral every 24 hours  senna Syrup 10 milliLiter(s) Oral every 24 hours  ursodiol Suspension 300 milliGRAM(s) Oral every 12 hours    finasteride 5 milliGRAM(s) Oral daily  insulin lispro (ADMELOG) corrective regimen sliding scale   SubCutaneous every 6 hours  insulin NPH human recombinant 6 Unit(s) SubCutaneous every 6 hours  predniSONE   Tablet 20 milliGRAM(s) Oral daily    calcium carbonate 1250 mG  + Vitamin D (OsCal 500 + D) 1 Tablet(s) Oral every 12 hours  chlorhexidine 0.12% Liquid 15 milliLiter(s) Oral Mucosa every 12 hours  chlorhexidine 2% Cloths 1 Application(s) Topical <User Schedule>  cycloSPORINE  , modified (GENGRAF) Solution 50 milliGRAM(s) Enteral Tube <User Schedule>  multiple electrolytes Injection Type 1 1000 milliLiter(s) IV Continuous <Continuous>      REVIEW OF SYSTEMS:  Unable to assess, pt is sedated and intubated    PHYSICAL EXAM:  T(C): 37.5 (11-25-24 @ 10:00), Max: 97.9 (11-24-24 @ 15:00)  HR: 102 (11-25-24 @ 10:00) (68 - 152)  BP: 134/65 (11-25-24 @ 01:00) (75/50 - 158/69)  RR: 19 (11-25-24 @ 10:00) (9 - 40)  SpO2: 100% (11-25-24 @ 10:00) (90% - 100%)  Wt(kg): --  I&O's Summary    24 Nov 2024 07:01  -  25 Nov 2024 07:00  --------------------------------------------------------  IN: 2942.5 mL / OUT: 2435 mL / NET: 507.5 mL    25 Nov 2024 07:01  -  25 Nov 2024 10:08  --------------------------------------------------------  IN: 158.4 mL / OUT: 335 mL / NET: -176.6 mL        Appearance: Unable to assess, pt is sedated and intubated	  HEENT: PERRL, intubated   Cardiovascular: Normal S1 S2, Irregularly irregular, No JVD, No murmurs  Respiratory: Lungs clear to auscultation	  Gastrointestinal: Soft, Non-tender, + BS	  Skin: No rashes, No ecchymoses, No cyanosis	  Extremities: No clubbing, cyanosis or edema  Vascular: Peripheral pulses palpable 2+ bilaterally        LABS:	 	    CBC Full  -  ( 25 Nov 2024 05:44 )  WBC Count : 22.27 K/uL  Hemoglobin : 11.4 g/dL  Hematocrit : 37.6 %  Platelet Count - Automated : 261 K/uL  Mean Cell Volume : 84.7 fl  Mean Cell Hemoglobin : 25.7 pg  Mean Cell Hemoglobin Concentration : 30.3 g/dL  Auto Neutrophil # : x  Auto Lymphocyte # : x  Auto Monocyte # : x  Auto Eosinophil # : x  Auto Basophil # : x  Auto Neutrophil % : x  Auto Lymphocyte % : x  Auto Monocyte % : x  Auto Eosinophil % : x  Auto Basophil % : x    11-25    143  |  109[H]  |  56[H]  ----------------------------<  177[H]  4.0   |  22  |  1.49[H]  11-24    144  |  110[H]  |  55[H]  ----------------------------<  151[H]  4.2   |  22  |  1.44[H]    Ca    7.9[L]      25 Nov 2024 05:44  Ca    7.6[L]      24 Nov 2024 22:56  Phos  3.7     11-25  Phos  4.5     11-24  Mg     2.4     11-25  Mg     2.3     11-24    TPro  4.6[L]  /  Alb  2.6[L]  /  TBili  0.9  /  DBili  x   /  AST  40  /  ALT  43  /  AlkPhos  93  11-25  TPro  4.7[L]  /  Alb  2.7[L]  /  TBili  0.7  /  DBili  x   /  AST  30  /  ALT  42  /  AlkPhos  65  11-24    Thyroid Stimulating Hormone, Serum (11.21.24 @ 08:32)    Thyroid Stimulating Hormone, Serum: 1.59 uIU/mL        TELEMETRY: Afib with VHR currently in the 100's, range 100-120's (briefly up to 140's at times)

## 2024-11-25 NOTE — PROGRESS NOTE ADULT - ASSESSMENT
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 Transplant ID following. on Meropenem/linezolid/fluc  -diuresis as able  -Reglan as needed    [ ] AMS   -Reintubated 11/20 Aspiration/hypoxia, extubated 11/24->[id number]/24  -Per family was on higher dose Xanax PO, Transplant Psych following  -EEG 11/20: Mild diffuse cerebral dysfunction is nonspecific in etiology. No epileptiform abnormalities or seizures.  -neurology following  -on keppra and ativan taper    [ ] Immunosuppression  - Cyclo per level, MMF HELD, pred 20  - SIMULECT completed  - Off tacro  - PPx: Valcyte/Bactrim/PPI/OsCal/Fluc    [ ] HTN/ pAFib  - BB as able  - EP following, ongoing discussions about AYDEN/Cardioversions/full A/C  - Dr. 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 Transplant ID following. on Meropenem/linezolid/fluc  - CT head/chest/abdomen and pelvis today   - watch renal function, change vaclyte to TIW and hold bactrim   - Tube feeds  - Strict I&Os   - ASA/SQH    [ ] Immunosuppression  - Cyclo per level, MMF HELD, pred 20  - SIMULECT completed  - Off tacro  - PPx: Valcyte/Bactrim/PPI/OsCal/Fluc    [ ] AMS   -Reintubated 11/20 Aspiration/hypoxia, extubated 11/24->[id number]/24  -Per family was on higher dose Xanax PO, Transplant Psych following  -EEG 11/20: Mild diffuse cerebral dysfunction is nonspecific in etiology. No epileptiform abnormalities or seizures.  -neurology following  -on keppra and ativan taper    [ ] HTN/ pAFib  - BB as able  - EP following, ongoing discussions about AYDEN/Cardioversions/full A/C  - Dr. Quintanilla following  - check Dig level in am     [ ] DM  -Lantus/Lispro, adjust prn  -Endocrine as needed given steroids

## 2024-11-25 NOTE — PROGRESS NOTE ADULT - SUBJECTIVE AND OBJECTIVE BOX
Transplant Surgery - Multidisciplinary Progress Note   --------------------------------------------------------------  OLT   11/15/2024        POD#10    Present: Patient seen and examined with multidisciplinary Transplant team including (Surgery: Dr. James. Hepatology: Dr. Purdy, ACP Earnestine, pharmacist, SICU team in AM rounds. Disciplines not in attendance will be notified of the plan.     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    Interval Events:  - Extubated -> reintubated 2/2 tachypnea and hypoxia  - started cyclo  - started proscar  - RUE neg DVT  - Fever curve, stopped precedex, cultures sent      Immunosuppression:  -Cyclo per level, MMF HELD, pred 20  -ongoing monitoring for signs of rejection  Potential Discharge date: Pending clinical course     TX DATA    Review of systems  All other systems were reviewed and are negative, except as noted.    PHYSICAL EXAM:  Constitutional: Well developed / well nourished  Eyes:  PERRLA  ENMT: nc/at, no thrush  Neck: supple   Respiratory: CTA B/L, intubated  Cardiovascular: RRR  Gastrointestinal: +Distended abdomen though improving, appropriate incisional TTP. chevron incision c/d/i, staples in place. No signs of infection.  ALYSSA#2  Genitourinary: Voiding via nichols,   Extremities: SCD's in place and working bilaterally  Neurological: A&O x0, intubated  Skin: no rashes, ulcerations, lesions  Psychiatric: Responsive Transplant Surgery - Multidisciplinary Progress Note   --------------------------------------------------------------  OLT   11/15/2024        POD#10    Present: Patient seen and examined with multidisciplinary Transplant team including Surgeone: Dr. James. Hepatologist: Dr. Purdy, ACP Mckayyaalejandra, pharmacist, SICU team in AM rounds. Disciplines not in attendance will be notified of the plan.     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    Interval Events:  - Extubated -> reintubated 2/2 tachypnea and hypoxia  - started cyclo  - started proscar  - RUE neg DVT  - febrile to 39 (yest) and 38 today    Immunosuppression:  -Cyclo per level, MMF HELD, pred 20  -ongoing monitoring for signs of rejection  Potential Discharge date: Pending clinical course     MEDICATIONS  (STANDING):  albuterol/ipratropium for Nebulization 3 milliLiter(s) Nebulizer every 6 hours  aspirin  chewable 81 milliGRAM(s) Enteral Tube daily  buDESOnide    Inhalation Suspension 0.5 milliGRAM(s) Inhalation two times a day  calcium carbonate 1250 mG  + Vitamin D (OsCal 500 + D) 1 Tablet(s) Oral every 12 hours  chlorhexidine 0.12% Liquid 15 milliLiter(s) Oral Mucosa every 12 hours  chlorhexidine 2% Cloths 1 Application(s) Topical <User Schedule>  cycloSPORINE  , modified (GENGRAF) Solution 50 milliGRAM(s) Enteral Tube <User Schedule>  digoxin  Injectable 125 MICROGram(s) IV Push daily  finasteride 5 milliGRAM(s) Oral daily  fluconAZOLE IVPB 400 milliGRAM(s) IV Intermittent every 24 hours  heparin   Injectable 5000 Unit(s) SubCutaneous every 12 hours  insulin lispro (ADMELOG) corrective regimen sliding scale   SubCutaneous every 6 hours  insulin NPH human recombinant 6 Unit(s) SubCutaneous every 6 hours  levETIRAcetam  IVPB 500 milliGRAM(s) IV Intermittent every 12 hours  linezolid  IVPB 600 milliGRAM(s) IV Intermittent every 12 hours  linezolid  IVPB      LORazepam   Injectable 1 milliGRAM(s) IV Push every 12 hours  meropenem  IVPB 1000 milliGRAM(s) IV Intermittent every 8 hours  metoprolol tartrate 50 milliGRAM(s) Enteral Tube every 8 hours  multiple electrolytes Injection Type 1 1000 milliLiter(s) (30 mL/Hr) IV Continuous <Continuous>  pantoprazole  Injectable 40 milliGRAM(s) IV Push daily  polyethylene glycol 3350 17 Gram(s) Oral every 24 hours  predniSONE   Tablet 20 milliGRAM(s) Oral daily  propofol Infusion 5 MICROgram(s)/kG/Min (2.81 mL/Hr) IV Continuous <Continuous>  senna Syrup 10 milliLiter(s) Oral every 24 hours  sodium chloride 3%  Inhalation 4 milliLiter(s) Inhalation every 6 hours  ursodiol Suspension 300 milliGRAM(s) Oral every 12 hours  valGANciclovir 50 mG/mL Oral Solution 450 milliGRAM(s) Oral <User Schedule>    MEDICATIONS  (PRN):  HYDROmorphone  Injectable 1 milliGRAM(s) IV Push every 3 hours PRN Breakthrough Pain      PAST MEDICAL & SURGICAL HISTORY:  Diabetes  Transaminitis  Paroxysmal atrial fibrillation  Depression  BPH (benign prostatic hyperplasia)  Hypertension  Chronic atrial fibrillation  Coronary artery disease  Hepatocellular carcinoma  DM (diabetes mellitus)  HTN (hypertension)  Paroxysmal atrial fibrillation  Cirrhosis  HCC (hepatocellular carcinoma)  History of BPH  History of laparoscopic cholecystectomy  History of lumbar laminectomy  H/O prior ablation treatment  H/O percutaneous left heart catheterization    Vital Signs Last 24 Hrs  T(C): 37.2 (25 Nov 2024 14:00), Max: 38 (25 Nov 2024 05:00)  T(F): 99 (25 Nov 2024 14:00), Max: 100.4 (25 Nov 2024 05:00)  HR: 94 (25 Nov 2024 14:00) (94 - 152)  BP: 134/65 (25 Nov 2024 01:00) (99/58 - 158/69)  BP(mean): 87 (25 Nov 2024 01:00) (74 - 108)  RR: 16 (25 Nov 2024 14:00) (9 - 40)  SpO2: 100% (25 Nov 2024 14:00) (97% - 100%)    Parameters below as of 25 Nov 2024 12:06  Patient On (Oxygen Delivery Method): ventilator    I&O's Summary    24 Nov 2024 07:01  -  25 Nov 2024 07:00  --------------------------------------------------------  IN: 2942.5 mL / OUT: 2435 mL / NET: 507.5 mL    25 Nov 2024 07:01  -  25 Nov 2024 15:19  --------------------------------------------------------  IN: 619.6 mL / OUT: 785 mL / NET: -165.4 mL                       11.4   22.27 )-----------( 261      ( 25 Nov 2024 05:44 )             37.6     11-25    143  |  109[H]  |  56[H]  ----------------------------<  177[H]  4.0   |  22  |  1.49[H]    Ca    7.9[L]      25 Nov 2024 05:44  Phos  3.7     11-25  Mg     2.4     11-25    TPro  4.6[L]  /  Alb  2.6[L]  /  TBili  0.9  /  DBili  x   /  AST  40  /  ALT  43  /  AlkPhos  93  11-25    Culture - Fungal, Bronchial (collected 11-24-24 @ 17:25)  Source: Bronchial  Preliminary Report (11-25-24 @ 09:40):    Testing in progress    Culture - Blood (collected 11-24-24 @ 11:10)  Source: .Blood BLOOD  Preliminary Report (11-25-24 @ 15:01):    No growth at 24 hours    Culture - Blood (collected 11-24-24 @ 11:05)  Source: .Blood BLOOD  Preliminary Report (11-25-24 @ 15:01):    No growth at 24 hours    Culture - Bronchial (collected 11-23-24 @ 13:09)  Source: Combi-Cath  Gram Stain (11-23-24 @ 23:07):    Moderate polymorphonuclear leukocytes per low power field    No squamous epithelial cells per low power field    No organisms seen  Final Report (11-25-24 @ 07:50):    Commensal charity consistent with body site    Culture - Blood (collected 11-22-24 @ 17:47)  Source: .Blood BLOOD  Preliminary Report (11-24-24 @ 23:01):    No growth at 48 Hours    Culture - Blood (collected 11-22-24 @ 17:47)  Source: .Blood BLOOD  Preliminary Report (11-24-24 @ 23:01):    No growth at 48 Hours    Culture - Blood (collected 11-20-24 @ 11:53)  Source: .Blood BLOOD  Preliminary Report (11-24-24 @ 16:01):    No growth at 4 days    Culture - Blood (collected 11-20-24 @ 11:43)  Source: .Blood BLOOD  Preliminary Report (11-24-24 @ 16:01):    No growth at 4 days    Review of systems  unable to obtain, intubated     PHYSICAL EXAM:  Constitutional: intubated   Eyes:  PERRLA  ENMT: nc/at, no thrush  Neck: supple   Respiratory: CTA B/L, intubated  Cardiovascular: RRR  Gastrointestinal: +Distended abdomen though improving, appropriate incisional TTP. chevron incision c/d/i, staples in place. No signs of infection.  ALYSSA#2  Genitourinary: Voiding via nichols,   Extremities: SCD's in place and working bilaterally  Neurological: A&O x0, intubated  Skin: no rashes, ulcerations, lesions

## 2024-11-26 ENCOUNTER — APPOINTMENT (OUTPATIENT)
Dept: HEPATOLOGY | Facility: CLINIC | Age: 74
End: 2024-11-26

## 2024-11-26 NOTE — CHART NOTE - NSCHARTNOTEFT_GEN_A_CORE
NUTRITION FOLLOW UP NOTE    PATIENT SEEN FOR: post-transplant follow up     SOURCE: [] Patient  [x] Current Medical Record  [x] RN  [] Family/support person at bedside  [x] Patient unavailable/inappropriate  [] Other:    CHART REVIEWED/EVENTS NOTED.  [x] Nutrition Status:  -S/p OLT 11/15  -Re-intubated post-op 11/20; extubated 11/24   -Now re-intubated 11/24; currently on SBT     DIET ORDER:   Diet, NPO with Tube Feed:   Tube Feeding Modality: Nasogastric  Glucerna 1.5 Stanford (GLUCERNA1.5RTH)  Total Volume for 24 Hours (mL): 720  Continuous  Starting Tube Feed Rate {mL per Hour}: 30  Until Goal Tube Feed Rate (mL per Hour): 30  Tube Feed Duration (in Hours): 24  Tube Feed Start Time: 23:00 (11-25-24)    ENTERAL NUTRITION  EN Order Provides:  720ml formula, 1080kcal, 59g protein, 546ml free water; meets 13kcal/kg and 0.7g protein/kg, based on wt: 80.7kg    Current Pump Rate:  EN provision: held for SBT; RD recommended goal rate never obtained since re-intubated       CURRENT DIET ORDER IS:  [x] Inadequate: does not meet estimated protein-energy needs       ANTHROPOMETRICS:  Drug Dosing Weight  Height (cm): 182.9 (15 Nov 2024 05:30)  Weight (kg): 93.6 (15 Nov 2024 05:30)  BMI (kg/m2): 28 (15 Nov 2024 05:30)    NUTRITIONALLY PERTINENT MEDICATIONS:  MEDICATIONS  (STANDING):  calcium carbonate 1250 mG  + Vitamin D (OsCal 500 + D)  digoxin  Injectable  finasteride  fluconAZOLE IVPB  insulin lispro (ADMELOG) corrective regimen sliding scale  insulin NPH human recombinant  linezolid  IVPB  linezolid  IVPB  meropenem  IVPB  metoprolol tartrate  multiple electrolytes Injection Type 1  pantoprazole  Injectable  polyethylene glycol 3350  predniSONE   Tablet  senna Syrup  ursodiol Suspension  valGANciclovir 50 mG/mL Oral Solution       NUTRITIONALLY PERTINENT LABS:  11-26 Na143 mmol/L Glu 183 mg/dL[H] K+ 3.9 mmol/L Cr  1.33 mg/dL[H] BUN 59 mg/dL[H] 11-26 Phos 2.1 mg/dL[L] 11-26 Alb 2.3 g/dL[L]11-26 ALT 48 U/L[H] AST 36 U/L Alkaline Phosphatase 173 U/L[H]            Finger Sticks:  POCT Blood Glucose.: 152 mg/dL (11-26 @ 05:21)  POCT Blood Glucose.: 167 mg/dL (11-26 @ 00:04)  POCT Blood Glucose.: 169 mg/dL (11-25 @ 18:05)  POCT Blood Glucose.: 238 mg/dL (11-25 @ 11:58)      NUTRITIONALLY PERTINENT MEDICATIONS/LABS:  [x] Reviewed  [x] Relevant notes on medications/labs:  -Cyclosporine  -Propofol --> now off   -Prednisone   -6 units Humulin q6, sliding scale insulin     EDEMA:  [x] Reviewed  [] Relevant notes:    GI/ I&O:  [x] Reviewed  [] Relevant notes:  [] Other:    SKIN:   [x] No pressure injuries documented, per nursing flowsheet    ESTIMATED NEEDS:  [x] No change:  Energy:   2178-2582kcal/day (27-32 kcal/kg)  Protein:   97-121g/day (1.2-1.5 g/kg)  Fluid:   ml/day or [x] defer to team  Based on: 80.7kg   -Monitor vent status (currently on CPAP)     NUTRITION DIAGNOSIS:  [x] Prior Dx:  1. Increased protein-energy needs     EDUCATION:  [] Yes:  [x] Not appropriate/warranted    NUTRITION CARE PLAN:  1. Diet: defer advancement of diet to Team   -If EN diet warranted while intubated: Glucerna 1.5 @ 55mL x 24hrs providing 1320mL of formula, 1980kcal/day (25kcal/kg), 109g protein/day (1.35g/kg), 1002mL of free water - based on 80.7kg (will increase goal rate as respiratory status improves)   2. Supplements: N/A  3. Multivitamin/mineral supplementation: [id number]    MONITORING AND EVALUATION:   RD remains available upon request and will follow up per protocol.    Malorie Pike, MS, RDN, CDN (Teams)   Available on MS TEAMS NUTRITION FOLLOW UP NOTE    PATIENT SEEN FOR: post-transplant follow up     SOURCE: [] Patient  [x] Current Medical Record  [x] RN  [] Family/support person at bedside  [x] Patient unavailable/inappropriate  [] Other:    CHART REVIEWED/EVENTS NOTED.  [x] Nutrition Status:  -S/p OLT 11/15  -Re-intubated post-op 11/20; extubated 11/24   -Now re-intubated 11/24; currently on SBT     DIET ORDER:   Diet, NPO with Tube Feed:   Tube Feeding Modality: Nasogastric  Glucerna 1.5 Stanford (GLUCERNA1.5RTH)  Total Volume for 24 Hours (mL): 720  Continuous  Starting Tube Feed Rate {mL per Hour}: 30  Until Goal Tube Feed Rate (mL per Hour): 30  Tube Feed Duration (in Hours): 24  Tube Feed Start Time: 23:00 (11-25-24)    ENTERAL NUTRITION  EN Order Provides:  720ml formula, 1080kcal, 59g protein, 546ml free water; meets 13kcal/kg and 0.7g protein/kg, based on wt: 80.7kg    Current Pump Rate:  EN provision: held for SBT; RD recommended goal rate never obtained since re-intubated       CURRENT DIET ORDER IS:  [x] Inadequate: does not meet estimated protein-energy needs   -NPO 11/20-11/24  -Inadequate EN providing <50% of estimated needs from 11/24-11/26      ANTHROPOMETRICS:  Drug Dosing Weight  Height (cm): 182.9 (15 Nov 2024 05:30)  Weight (kg): 93.6 (15 Nov 2024 05:30)  BMI (kg/m2): 28 (15 Nov 2024 05:30)    NUTRITIONALLY PERTINENT MEDICATIONS:  MEDICATIONS  (STANDING):  calcium carbonate 1250 mG  + Vitamin D (OsCal 500 + D)  digoxin  Injectable  finasteride  fluconAZOLE IVPB  insulin lispro (ADMELOG) corrective regimen sliding scale  insulin NPH human recombinant  linezolid  IVPB  linezolid  IVPB  meropenem  IVPB  metoprolol tartrate  multiple electrolytes Injection Type 1  pantoprazole  Injectable  polyethylene glycol 3350  predniSONE   Tablet  senna Syrup  ursodiol Suspension  valGANciclovir 50 mG/mL Oral Solution       NUTRITIONALLY PERTINENT LABS:  11-26 Na143 mmol/L Glu 183 mg/dL[H] K+ 3.9 mmol/L Cr  1.33 mg/dL[H] BUN 59 mg/dL[H] 11-26 Phos 2.1 mg/dL[L] 11-26 Alb 2.3 g/dL[L]11-26 ALT 48 U/L[H] AST 36 U/L Alkaline Phosphatase 173 U/L[H]            Finger Sticks:  POCT Blood Glucose.: 152 mg/dL (11-26 @ 05:21)  POCT Blood Glucose.: 167 mg/dL (11-26 @ 00:04)  POCT Blood Glucose.: 169 mg/dL (11-25 @ 18:05)  POCT Blood Glucose.: 238 mg/dL (11-25 @ 11:58)      NUTRITIONALLY PERTINENT MEDICATIONS/LABS:  [x] Reviewed  [x] Relevant notes on medications/labs:  -Cyclosporine  -Propofol --> now off   -Prednisone   -6 units Humulin q6, sliding scale insulin     EDEMA:  [x] Reviewed  [x] Relevant notes:  -1+ generalized   -2+ R arm     GI/ I&O:  [x] Reviewed  [] Relevant notes:  [] Other:    SKIN:   [x] No pressure injuries documented, per nursing flowsheet    ESTIMATED NEEDS:  [x] No change:  Energy:   2178-2582kcal/day (27-32 kcal/kg)  Protein:   97-121g/day (1.2-1.5 g/kg)  Fluid:   ml/day or [x] defer to team  Based on: 80.7kg   -Monitor vent status (currently on CPAP)     NUTRITION DIAGNOSIS:  [x] Prior Dx:  1. Increased protein-energy needs   [x] New Dx:   P: Severe malnutrition in the context of acute illness  E: related to inadequate protein-energy intake in the setting of prolonged post-op course   S: as evidenced by <50% intake x >/5 days; moderate edema   Goal: Pt to receive >80% of estimated needs     EDUCATION:  [] Yes:  [x] Not appropriate/warranted    NUTRITION CARE PLAN:  1. Diet: defer advancement of diet to Team   -If EN diet warranted while intubated: Glucerna 1.5 @ 55mL x 24hrs providing 1320mL of formula, 1980kcal/day (25kcal/kg), 109g protein/day (1.35g/kg), 1002mL of free water - based on 80.7kg (will increase goal rate as respiratory status improves)   2. Supplements: N/A  3. Multivitamin/mineral supplementation: [id number]  4. Malnutrition sticker placed     MONITORING AND EVALUATION:   RD remains available upon request and will follow up per protocol.    Malorie Pike MS, RDN, CDN (Teams)   Available on [facility] TEAMS

## 2024-11-26 NOTE — AIRWAY REMOVAL NOTE  ADULT & PEDS - ARTIFICAL AIRWAY REMOVAL COMMENTS
Written order for extubation verified. The patient was identified by full name and birth date compared to the identification band. Present during the procedure was Maria Luz PARRISH.
Written order for extubation verified. The patient was identified by full name and birth date compared to the identification band. Present during the procedure was Mira PARRISH.
Written order for extubation verified. The patient was identified by full name and birth date compared to the identification band. Present during the procedure was SHIVANI Tompkins

## 2024-11-26 NOTE — PROGRESS NOTE ADULT - ASSESSMENT
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 Transplant ID following. on Meropenem/linezolid/fluc  - 11/25 CT head neg, CT CAP - R lower, L upper lobe opacities  - watch renal function, change vaclyte to TIW and hold bactrim   - Tube feeds  - Strict I&Os   - ASA/SQH    [ ] Immunosuppression  - Cyclo per level, MMF HELD, pred 20  - SIMULECT completed  - Off tacro  - PPx: Valcyte/Bactrim/PPI/OsCal/Fluc    [ ] AMS   -Reintubated 11/20 Aspiration/hypoxia, extubated 11/24->[id number]/24  -Per family PT was on higher dose Xanax PO, Transplant Psych following  -EEG 11/20: Mild diffuse cerebral dysfunction is nonspecific in etiology. No epileptiform abnormalities or seizures.  -neurology following  -on keppra and ativan taper    [ ] HTN/ pAFib  - BB as able  - EP following, ongoing discussions about AYDEN/Cardioversions/full A/C  - Dr. 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 Complex collection within the gallbladder fossa, decreased in size from 11/18/2024.  - watch leukocytosis and fever curve, cultures negative, concern for aspiration PNA and possible ileus on imaging 11/21. Transplant ID following. on Meropenem/linezolid/fluc: pending infectious w/u  - 11/25 CT head neg, CT CAP - R lower, L upper lobe opacities  - watch renal function, change vaclyte to TIW and hold bactrim   - Tube feeds  - Strict I&Os   - ASA/SQH    [ ] Immunosuppression  - Cyclo per level, MMF HELD, pred 20  - SIMULECT completed  - Off tacro  - PPx: Valcyte/Bactrim/PPI/OsCal/Fluc    [ ] AMS   -Reintubated 11/20 Aspiration/hypoxia, extubated 11/24->[id number]--> extubated 11/26  -Per family PT was on higher dose Xanax PO, Transplant Psych following  -EEG 11/20: Mild diffuse cerebral dysfunction is nonspecific in etiology. No epileptiform abnormalities or seizures.  -neurology following  -on keppra and ativan taper    [ ] HTN/ pAFib  - BB as able  - EP following, ongoing discussions about AYDEN/Cardioversions/full A/C  - Dr. Quintanilla following  - check Dig level     [ ] DM  -Lantus/Lispro, adjust prn  -Endocrine as needed given steroids

## 2024-11-26 NOTE — PROGRESS NOTE ADULT - SUBJECTIVE AND OBJECTIVE BOX
Transplant Surgery - Multidisciplinary Progress Note   --------------------------------------------------------------  OLT   11/15/2024        POD#11    Present: Patient seen and examined with multidisciplinary Transplant team including Surgeone: Dr. James. Hepatologist: Dr. Purdy, MELINDA Velasquez/[facility], pharmacist, SICU team in AM rounds. Disciplines not in attendance will be notified of the plan.     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    Interval Events:  - Extubated -> reintubated 2/2 tachypnea and hypoxia  - attempt CPAP again at 6am   - Febrile --> CT CAP- opacities seen no intraabdominal collections  - c/w Christopher/Linezolid/Fluc pending infectious w/u   - CT head neg    Immunosuppression:  -Cyclo per level, MMF HELD, pred 20  -ongoing monitoring for signs of rejection  Potential Discharge date: Pending clinical course       TX DATA HERE     Review of systems  unable to obtain, intubated     PHYSICAL EXAM:  Constitutional: intubated   Eyes:  PERRLA  ENMT: nc/at, no thrush  Neck: supple   Respiratory: CTA B/L, intubated  Cardiovascular: RRR  Gastrointestinal: +Distended abdomen though improving, appropriate incisional TTP. chevron incision c/d/i, staples in place. No signs of infection.  ALYSSA#2  Genitourinary: Voiding via nichols,   Extremities: SCD's in place and working bilaterally  Neurological: A&O x0, intubated  Skin: no rashes, ulcerations, lesions   Transplant Surgery - Multidisciplinary Progress Note   --------------------------------------------------------------  OLT   11/15/2024        POD#11    Present: Patient seen and examined with multidisciplinary Transplant team including Surge[facility]: Dr. James. Hepatologist: Dr. Purdy, MELINDA Velasquez/[facility], pharmacist, SICU team in AM rounds. Disciplines not in attendance will be notified of the plan.     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    Interval Events:  - Febrile (100.2F): CT CAP- opacities seen no intraabdominal collections, A-fib (120's on Metoprolol 50 q8h)  - Extubated on 11/26 12:45 pm: on aerosol mask at 30%   - attempt CPAP again at 6am   - CT head neg    Immunosuppression:  -Cyclo per level, MMF HELD, pred 20  -ongoing monitoring for signs of rejection  Potential Discharge date: Pending clinical course       MEDICATIONS  (STANDING):  acetaminophen   IVPB .. 500 milliGRAM(s) IV Intermittent every 6 hours  albuterol/ipratropium for Nebulization 3 milliLiter(s) Nebulizer every 6 hours  aspirin  chewable 81 milliGRAM(s) Enteral Tube daily  buDESOnide    Inhalation Suspension 0.5 milliGRAM(s) Inhalation two times a day  calcium carbonate 1250 mG  + Vitamin D (OsCal 500 + D) 1 Tablet(s) Oral every 12 hours  calcium gluconate IVPB 2 Gram(s) IV Intermittent once  chlorhexidine 2% Cloths 1 Application(s) Topical <User Schedule>  cycloSPORINE  , modified (GENGRAF) Solution 50 milliGRAM(s) Enteral Tube <User Schedule>  digoxin  Injectable 125 MICROGram(s) IV Push daily  finasteride 5 milliGRAM(s) Oral daily  fluconAZOLE IVPB 400 milliGRAM(s) IV Intermittent every 24 hours  heparin   Injectable 5000 Unit(s) SubCutaneous every 12 hours  insulin lispro (ADMELOG) corrective regimen sliding scale   SubCutaneous every 6 hours  insulin NPH human recombinant 6 Unit(s) SubCutaneous every 6 hours  levETIRAcetam  Solution 250 milliGRAM(s) Oral two times a day  LORazepam   Injectable 1 milliGRAM(s) IV Push every 12 hours  meropenem  IVPB 1000 milliGRAM(s) IV Intermittent every 8 hours  metoprolol tartrate 50 milliGRAM(s) Enteral Tube every 8 hours  multiple electrolytes Injection Type 1 1000 milliLiter(s) (30 mL/Hr) IV Continuous <Continuous>  pantoprazole  Injectable 40 milliGRAM(s) IV Push daily  polyethylene glycol 3350 17 Gram(s) Oral every 24 hours  predniSONE   Tablet 20 milliGRAM(s) Oral daily  senna Syrup 10 milliLiter(s) Oral every 24 hours  sodium chloride 3%  Inhalation 4 milliLiter(s) Inhalation every 6 hours  ursodiol Suspension 300 milliGRAM(s) Oral every 12 hours  valGANciclovir 50 mG/mL Oral Solution 450 milliGRAM(s) Oral <User Schedule>    MEDICATIONS  (PRN):  HYDROmorphone  Injectable 1 milliGRAM(s) IV Push every 3 hours PRN Breakthrough Pain      PAST MEDICAL & SURGICAL HISTORY:  Diabetes      Transaminitis      Paroxysmal atrial fibrillation      Depression      BPH (benign prostatic hyperplasia)      Hypertension      Chronic atrial fibrillation      Coronary artery disease      Hepatocellular carcinoma      DM (diabetes mellitus)      HTN (hypertension)      Paroxysmal atrial fibrillation      Cirrhosis      HCC (hepatocellular carcinoma)      History of BPH      History of laparoscopic cholecystectomy      History of lumbar laminectomy      H/O prior ablation treatment      H/O percutaneous left heart catheterization          Vital Signs Last 24 Hrs  T(C): 37.9 (26 Nov 2024 11:00), Max: 38.1 (25 Nov 2024 23:00)  T(F): 100.2 (26 Nov 2024 11:00), Max: 100.6 (25 Nov 2024 23:00)  HR: 101 (26 Nov 2024 13:00) (92 - 125)  BP: 127/60 (26 Nov 2024 13:00) (93/59 - 146/65)  BP(mean): 86 (26 Nov 2024 13:00) (71 - 95)  RR: 16 (26 Nov 2024 13:00) (10 - 25)  SpO2: 100% (26 Nov 2024 13:00) (98% - 100%)    Parameters below as of 26 Nov 2024 12:48      O2 Concentration (%): 30    I&O's Summary    25 Nov 2024 07:01  -  26 Nov 2024 07:00  --------------------------------------------------------  IN: 2557.2 mL / OUT: 2445 mL / NET: 112.2 mL    26 Nov 2024 07:01  -  26 Nov 2024 14:13  --------------------------------------------------------  IN: 1089.8 mL / OUT: 605 mL / NET: 484.8 mL                              10.4   19.95 )-----------( 227      ( 26 Nov 2024 06:27 )             34.0     11-26    143  |  110[H]  |  59[H]  ----------------------------<  183[H]  3.9   |  22  |  1.33[H]    Ca    7.3[L]      26 Nov 2024 06:27  Phos  2.1     11-26  Mg     2.4     11-26    TPro  4.3[L]  /  Alb  2.3[L]  /  TBili  0.6  /  DBili  x   /  AST  36  /  ALT  48[H]  /  AlkPhos  173[H]  11-26          Culture - Fungal, Bronchial (collected 11-24-24 @ 17:25)  Source: Bronchial  Preliminary Report (11-26-24 @ 07:29):    No growth    Culture - Blood (collected 11-24-24 @ 11:10)  Source: .Blood BLOOD  Preliminary Report (11-25-24 @ 15:01):    No growth at 24 hours    Culture - Blood (collected 11-24-24 @ 11:05)  Source: .Blood BLOOD  Preliminary Report (11-25-24 @ 15:01):    No growth at 24 hours    Culture - Bronchial (collected 11-23-24 @ 13:09)  Source: Combi-Cath  Gram Stain (11-23-24 @ 23:07):    Moderate polymorphonuclear leukocytes per low power field    No squamous epithelial cells per low power field    No organisms seen  Final Report (11-25-24 @ 07:50):    Commensal charity consistent with body site    Culture - Blood (collected 11-22-24 @ 17:47)  Source: .Blood BLOOD  Preliminary Report (11-25-24 @ 23:00):    No growth at 72 Hours    Culture - Blood (collected 11-22-24 @ 17:47)  Source: .Blood BLOOD  Preliminary Report (11-25-24 @ 23:00):    No growth at 72 Hours    Culture - Blood (collected 11-20-24 @ 11:53)  Source: .Blood BLOOD  Final Report (11-25-24 @ 16:00):    No growth at 5 days    Culture - Blood (collected 11-20-24 @ 11:43)  Source: .Blood BLOOD  Final Report (11-25-24 @ 16:00):    No growth at 5 days      Review of systems  unable to obtain    PHYSICAL EXAM:  Constitutional: extubated   Eyes:  PERRLA  ENMT: nc/at, no thrush  Neck: supple   Respiratory: CTA B/L, intubated  Cardiovascular: RRR  Gastrointestinal: +Distended abdomen though improving, appropriate incisional TTP. chevron incision c/d/i, staples in place. No signs of infection.  ALYSSA#2  Genitourinary: Voiding via nichols,   Extremities: SCD's in place and working bilaterally  Neurological: A&O x0, extubated  Skin: no rashes, ulcerations, lesions   Transplant Surgery - Multidisciplinary Progress Note   --------------------------------------------------------------  OLT   11/15/2024        POD#11    Present: Patient seen and examined with multidisciplinary Transplant team including [facility]: Dr. James. Hepatologist: Dr. Purdy, MELINDA Velasquez/[facility], pharmacist, SICU team in AM rounds. Disciplines not in attendance will be notified of the plan.     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    Interval Events:  - Febrile (100.2F): CT CAP- opacities seen no intraabdominal collections, A-fib (120's on Metoprolol 50 q8h)  - Extubated on 11/26 12:45 pm: on aerosol mask at 30%   - attempt CPAP again at 6am   - CT head neg    Immunosuppression:  -Cyclo per level, MMF HELD, pred 20  -ongoing monitoring for signs of rejection  Potential Discharge date: Pending clinical course       MEDICATIONS  (STANDING):  acetaminophen   IVPB .. 500 milliGRAM(s) IV Intermittent every 6 hours  albuterol/ipratropium for Nebulization 3 milliLiter(s) Nebulizer every 6 hours  aspirin  chewable 81 milliGRAM(s) Enteral Tube daily  buDESOnide    Inhalation Suspension 0.5 milliGRAM(s) Inhalation two times a day  calcium carbonate 1250 mG  + Vitamin D (OsCal 500 + D) 1 Tablet(s) Oral every 12 hours  calcium gluconate IVPB 2 Gram(s) IV Intermittent once  chlorhexidine 2% Cloths 1 Application(s) Topical <User Schedule>  cycloSPORINE  , modified (GENGRAF) Solution 50 milliGRAM(s) Enteral Tube <User Schedule>  digoxin  Injectable 125 MICROGram(s) IV Push daily  finasteride 5 milliGRAM(s) Oral daily  fluconAZOLE IVPB 400 milliGRAM(s) IV Intermittent every 24 hours  heparin   Injectable 5000 Unit(s) SubCutaneous every 12 hours  insulin lispro (ADMELOG) corrective regimen sliding scale   SubCutaneous every 6 hours  insulin NPH human recombinant 6 Unit(s) SubCutaneous every 6 hours  levETIRAcetam  Solution 250 milliGRAM(s) Oral two times a day  LORazepam   Injectable 1 milliGRAM(s) IV Push every 12 hours  meropenem  IVPB 1000 milliGRAM(s) IV Intermittent every 8 hours  metoprolol tartrate 50 milliGRAM(s) Enteral Tube every 8 hours  multiple electrolytes Injection Type 1 1000 milliLiter(s) (30 mL/Hr) IV Continuous <Continuous>  pantoprazole  Injectable 40 milliGRAM(s) IV Push daily  polyethylene glycol 3350 17 Gram(s) Oral every 24 hours  predniSONE   Tablet 20 milliGRAM(s) Oral daily  senna Syrup 10 milliLiter(s) Oral every 24 hours  sodium chloride 3%  Inhalation 4 milliLiter(s) Inhalation every 6 hours  ursodiol Suspension 300 milliGRAM(s) Oral every 12 hours  valGANciclovir 50 mG/mL Oral Solution 450 milliGRAM(s) Oral <User Schedule>    MEDICATIONS  (PRN):  HYDROmorphone  Injectable 1 milliGRAM(s) IV Push every 3 hours PRN Breakthrough Pain    PAST MEDICAL & SURGICAL HISTORY:  Diabetes    Transaminitis    Paroxysmal atrial fibrillation    Depression    BPH (benign prostatic hyperplasia)    Hypertension    Chronic atrial fibrillation    Coronary artery disease    Hepatocellular carcinoma    DM (diabetes mellitus)    HTN (hypertension)    Paroxysmal atrial fibrillation    Cirrhosis    HCC (hepatocellular carcinoma)    History of BPH    History of laparoscopic cholecystectomy    History of lumbar laminectomy    H/O prior ablation treatment    H/O percutaneous left heart catheterization    Vital Signs Last 24 Hrs  T(C): 37.9 (26 Nov 2024 11:00), Max: 38.1 (25 Nov 2024 23:00)  T(F): 100.2 (26 Nov 2024 11:00), Max: 100.6 (25 Nov 2024 23:00)  HR: 101 (26 Nov 2024 13:00) (92 - 125)  BP: 127/60 (26 Nov 2024 13:00) (93/59 - 146/65)  BP(mean): 86 (26 Nov 2024 13:00) (71 - 95)  RR: 16 (26 Nov 2024 13:00) (10 - 25)  SpO2: 100% (26 Nov 2024 13:00) (98% - 100%)    Parameters below as of 26 Nov 2024 12:48    O2 Concentration (%): 30    I&O's Summary    25 Nov 2024 07:01  -  26 Nov 2024 07:00  --------------------------------------------------------  IN: 2557.2 mL / OUT: 2445 mL / NET: 112.2 mL    26 Nov 2024 07:01  -  26 Nov 2024 14:13  --------------------------------------------------------  IN: 1089.8 mL / OUT: 605 mL / NET: 484.8 mL                 10.4   19.95 )-----------( 227      ( 26 Nov 2024 06:27 )             34.0     11-26    143  |  110[H]  |  59[H]  ----------------------------<  183[H]  3.9   |  22  |  1.33[H]    Ca    7.3[L]      26 Nov 2024 06:27  Phos  2.1     11-26  Mg     2.4     11-26    TPro  4.3[L]  /  Alb  2.3[L]  /  TBili  0.6  /  DBili  x   /  AST  36  /  ALT  48[H]  /  AlkPhos  173[H]  11-26    Culture - Fungal, Bronchial (collected 11-24-24 @ 17:25)  Source: Bronchial  Preliminary Report (11-26-24 @ 07:29):    No growth    Culture - Blood (collected 11-24-24 @ 11:10)  Source: .Blood BLOOD  Preliminary Report (11-25-24 @ 15:01):    No growth at 24 hours    Culture - Blood (collected 11-24-24 @ 11:05)  Source: .Blood BLOOD  Preliminary Report (11-25-24 @ 15:01):    No growth at 24 hours    Culture - Bronchial (collected 11-23-24 @ 13:09)  Source: Combi-Cath  Gram Stain (11-23-24 @ 23:07):    Moderate polymorphonuclear leukocytes per low power field    No squamous epithelial cells per low power field    No organisms seen  Final Report (11-25-24 @ 07:50):    Commensal charity consistent with body site    Culture - Blood (collected 11-22-24 @ 17:47)  Source: .Blood BLOOD  Preliminary Report (11-25-24 @ 23:00):    No growth at 72 Hours    Culture - Blood (collected 11-22-24 @ 17:47)  Source: .Blood BLOOD  Preliminary Report (11-25-24 @ 23:00):    No growth at 72 Hours    Culture - Blood (collected 11-20-24 @ 11:53)  Source: .Blood BLOOD  Final Report (11-25-24 @ 16:00):    No growth at 5 days    Culture - Blood (collected 11-20-24 @ 11:43)  Source: .Blood BLOOD  Final Report (11-25-24 @ 16:00):    No growth at 5 days    Review of systems  unable to obtain    PHYSICAL EXAM:  Constitutional: extubated   Eyes:  PERRLA  ENMT: nc/at, no thrush  Neck: supple   Respiratory: CTA B/L, intubated  Cardiovascular: RRR  Gastrointestinal: +Distended abdomen though improving, appropriate incisional TTP. chevron incision c/d/i, staples in place. No signs of infection.  ALYSSA#2  Genitourinary: Voiding via nichols,   Extremities: SCD's in place and working bilaterally  Neurological: A&O x0, extubated  Skin: no rashes, ulcerations, lesions

## 2024-11-26 NOTE — PROGRESS NOTE ADULT - ATTENDING COMMENTS
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 Post op course complicated by AF RVR, hypoxic respiratory failure.   S/p extubated, reintubated for worsening of resp distress    Gets very sleepy after Keppra dose, will cut down to 250 bid, continue low dose prn Dilaudid. Ativan taper for possible benzo withdrawal until 11/27.   Good gas exchange, CXR no acute finding, met criteria for extubation on SBT s/p extubated tolerating well.   Not on pressure, Lopressor dose adjusted for a fib with RVR, On Dig/dig 0.9  Tolerating tube feed with resolving colonic inertia, continue bowel regimen.  LFT down trending, normal. Hepatic US shows good flow.  Renal function improving, diuresing well on IV Lasix. On Finasteride for retention, on low dose IVF Plasmalyte.  CT H/C/A/P reviewed, no gross abnormalities, afebrile with resolving leucocytosis. Abx Linezolid Christopher can be deescalated.  ISS/NPH dose adjustment    Pharm DVT ppx SQH    Anti rej: steroid taper, cyclosporine, cyclosporine level  PPx abx: Valcyte, Fluc, Bactrim on hold    Multiple family members at bed side kept updated    OOB/mobilization

## 2024-11-26 NOTE — PROGRESS NOTE ADULT - ASSESSMENT
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 Pt underwent Y90 Sept, 2023 with favorable treatment response.s/p OLT 11/15/24     # S/p OLT 11/15/24 CMV D?R? EBV , toxo D?/R?  VAlcyte, bactrim ppx  fluconazole ppx    # Fever, sepsis, hypoxic respiratory failure s/p on mechanical ventilation  Ct C/A/P Bilateral lower lobe consolidation with fluid and debris in the right lower lobe bronchi, concerning for aspiration pneumonia.  CMV PCR (11/24) 146 (low level CMV viremia - not likely contributing)  Adenovirus PCR (11/24) Negative  Cryptococcal Serum Ag (11/24) Negative  CT Chest (11/25) Groundglass opacities in the right lower and left upper lobes, possibly infectious in nature.  CT A/P (11/25) No acute process    # ileus  no diarrhea    Recommend  Would stop Linezolid, Blood cultures unrevealing and MRSA Nasal PCR negative  Continue Meropenem 1 gram q 8hr  if BAl cx remain with no growth, will change jeniffer to aztreonam and reassess fevers   follow WNV IGG/iGM/PCR  follow serum and bronch aspergillus galactomannan   follow bartonella PCR  follow tick borne panel PCR  follow bronchoscopy cultures    I will continue to follow. Please feel free to contact me with any further questions.    Kyle Junior M.D.  [facility] Division of Infectious Disease  8AM-5PM Monday - Friday: Available on [employer] Teams  After Hours and Holidays (or if no response on [employer] Teams): Please contact the Infectious Diseases Office at [phone]    The above assessment and plan were discussed with transplant surgery team

## 2024-11-26 NOTE — PROGRESS NOTE ADULT - SUBJECTIVE AND OBJECTIVE BOX
HISTORY  74y Male    24 HOUR EVENTS:  - Metoprolol increased to 50mg q6  - 3hr SBT 11/25 PM - attempt CPAP again at 6am   - D/c bactrim, valcyte  - F/u dig level 11/26  - Advance TF to 30   - CTH neg  - CT CAP - R low, L upper lob opacities, improved bibasilar atelectasis      SUBJECTIVE/ROS:  [ ] A ten-point review of systems was otherwise negative except as noted.  [ ] Due to altered mental status/intubation, subjective information were not able to be obtained from the patient. History was obtained, to the extent possible, from review of the chart and collateral sources of information.      NEURO  Exam: awake, alert, oriented  Meds: acetaminophen   IVPB .. 500 milliGRAM(s) IV Intermittent every 6 hours  HYDROmorphone  Injectable 1 milliGRAM(s) IV Push every 3 hours PRN Breakthrough Pain  levETIRAcetam  IVPB 500 milliGRAM(s) IV Intermittent every 12 hours  LORazepam   Injectable 1 milliGRAM(s) IV Push every 12 hours  propofol Infusion 5 MICROgram(s)/kG/Min IV Continuous <Continuous>    [x] Adequacy of sedation and pain control has been assessed and adjusted      RESPIRATORY  RR: 18 (11-25-24 @ 23:45) (11 - 25)  SpO2: 100% (11-25-24 @ 23:47) (97% - 100%)  Wt(kg): --  Exam: unlabored, clear to auscultation bilaterally  Mechanical Ventilation: Mode: CPAP with PS, RR (patient): 14, FiO2: 30, PEEP: 5, PS: 5, MAP: 7.3, PIP: 10  ABG - ( 25 Nov 2024 20:30 )  pH: 7.47  /  pCO2: 37    /  pO2: 156   / HCO3: 27    / Base Excess: 3.2   /  SaO2: 99.6    Lactate: x            [N/A] Extubation Readiness Assessed  Meds: albuterol/ipratropium for Nebulization 3 milliLiter(s) Nebulizer every 6 hours  buDESOnide    Inhalation Suspension 0.5 milliGRAM(s) Inhalation two times a day  sodium chloride 3%  Inhalation 4 milliLiter(s) Inhalation every 6 hours        CARDIOVASCULAR  HR: 102 (11-25-24 @ 23:47) (92 - 126)  BP: 109/58 (11-25-24 @ 23:45) (107/52 - 127/69)  BP(mean): 79 (11-25-24 @ 23:45) (75 - 91)  ABP: 94/83 (11-25-24 @ 23:45) (89/61 - 201/192)  ABP(mean): 89 (11-25-24 @ 23:45) (68 - 197)  Wt(kg): --  CVP(cm H2O): --      Exam: regular rate and rhythm  Cardiac Rhythm: sinus  Perfusion     [x]Adequate   [ ]Inadequate  Mentation   [x]Normal       [ ]Reduced  Extremities  [x]Warm         [ ]Cool  Volume Status [ ]Hypervolemic [x]Euvolemic [ ]Hypovolemic  Meds: digoxin  Injectable 125 MICROGram(s) IV Push daily  metoprolol tartrate 50 milliGRAM(s) Enteral Tube every 8 hours        GI/NUTRITION  Exam: soft, nontender, nondistended, incision C/D/I, ALYSSA x1  Diet:  Meds: pantoprazole  Injectable 40 milliGRAM(s) IV Push daily  polyethylene glycol 3350 17 Gram(s) Oral every 24 hours  senna Syrup 10 milliLiter(s) Oral every 24 hours  ursodiol Suspension 300 milliGRAM(s) Oral every 12 hours      GENITOURINARY  I&O's Detail    11-24 @ 07:01  -  11-25 @ 07:00  --------------------------------------------------------  IN:    Dexmedetomidine: 126.5 mL    Enteral Tube Flush: 260 mL    Glucerna 1.5: 230 mL    IV PiggyBack: 550 mL    IV PiggyBack: 1000 mL    multiple electrolytes Injection Type 1.: 720 mL    Propofol: 5.6 mL    Propofol: 50.4 mL  Total IN: 2942.5 mL    OUT:    Bulb (mL): 90 mL    Bulb (mL): 500 mL    Indwelling Catheter - Urethral (mL): 1845 mL  Total OUT: 2435 mL    Total NET: 507.5 mL      11-25 @ 07:01  -  11-26 @ 01:00  --------------------------------------------------------  IN:    Enteral Tube Flush: 120 mL    Glucerna 1.5: 220 mL    IV PiggyBack: 100 mL    IV PiggyBack: 600 mL    multiple electrolytes Injection Type 1.: 450 mL    Propofol: 33.6 mL  Total IN: 1523.6 mL    OUT:    Bulb (mL): 780 mL    Indwelling Catheter - Urethral (mL): 810 mL  Total OUT: 1590 mL    Total NET: -66.4 mL          11-25    143  |  109[H]  |  56[H]  ----------------------------<  177[H]  4.0   |  22  |  1.49[H]    Ca    7.9[L]      25 Nov 2024 05:44  Phos  3.7     11-25  Mg     2.4     11-25    TPro  4.6[L]  /  Alb  2.6[L]  /  TBili  0.9  /  DBili  x   /  AST  40  /  ALT  43  /  AlkPhos  93  11-25    [ ] Castro catheter, indication: N/A  Meds: calcium carbonate 1250 mG  + Vitamin D (OsCal 500 + D) 1 Tablet(s) Oral every 12 hours  multiple electrolytes Injection Type 1 1000 milliLiter(s) IV Continuous <Continuous>        HEMATOLOGIC  Meds: aspirin  chewable 81 milliGRAM(s) Enteral Tube daily  heparin   Injectable 5000 Unit(s) SubCutaneous every 12 hours    [x] VTE Prophylaxis                        11.4   22.27 )-----------( 261      ( 25 Nov 2024 05:44 )             37.6     PT/INR - ( 25 Nov 2024 05:45 )   PT: 15.7 sec;   INR: 1.38 ratio         PTT - ( 25 Nov 2024 05:45 )  PTT:25.9 sec      INFECTIOUS DISEASES  WBC Count: 22.27 K/uL (11-25 @ 05:44)    RECENT CULTURES:  Specimen Source: Bronchial  Date/Time: 11-24 @ 17:25  Culture Results:   Testing in progress  Gram Stain: --  Organism: --  Specimen Source: .Blood BLOOD  Date/Time: 11-24 @ 11:10  Culture Results:   No growth at 24 hours  Gram Stain: --  Organism: --  Specimen Source: .Blood BLOOD  Date/Time: 11-24 @ 11:05  Culture Results:   No growth at 24 hours  Gram Stain: --  Organism: --  Specimen Source: Combi-Cath  Date/Time: 11-23 @ 13:09  Culture Results:   Commensal charity consistent with body site  Gram Stain:   Moderate polymorphonuclear leukocytes per low power field  No squamous epithelial cells per low power field  No organisms seen  Organism: --  Specimen Source: .Blood BLOOD  Date/Time: 11-22 @ 17:47  Culture Results:   No growth at 72 Hours  Gram Stain: --  Organism: --    Meds: cycloSPORINE  , modified (GENGRAF) Solution 50 milliGRAM(s) Enteral Tube <User Schedule>  fluconAZOLE IVPB 400 milliGRAM(s) IV Intermittent every 24 hours  linezolid  IVPB      linezolid  IVPB 600 milliGRAM(s) IV Intermittent every 12 hours  meropenem  IVPB 1000 milliGRAM(s) IV Intermittent every 8 hours  valGANciclovir 50 mG/mL Oral Solution 450 milliGRAM(s) Oral <User Schedule>        ENDOCRINE  CAPILLARY BLOOD GLUCOSE      POCT Blood Glucose.: 167 mg/dL (26 Nov 2024 00:04)  POCT Blood Glucose.: 169 mg/dL (25 Nov 2024 18:05)  POCT Blood Glucose.: 238 mg/dL (25 Nov 2024 11:58)  POCT Blood Glucose.: 167 mg/dL (25 Nov 2024 05:22)    Meds: finasteride 5 milliGRAM(s) Oral daily  insulin lispro (ADMELOG) corrective regimen sliding scale   SubCutaneous every 6 hours  insulin NPH human recombinant 6 Unit(s) SubCutaneous every 6 hours  predniSONE   Tablet 20 milliGRAM(s) Oral daily        CODE STATUS:

## 2024-11-26 NOTE — DIETITIAN NUTRITION RISK NOTIFICATION - TREATMENT: THE FOLLOWING DIET HAS BEEN RECOMMENDED
Diet, NPO with Tube Feed:   Tube Feeding Modality: Nasogastric  Glucerna 1.5 [place] (GLUCERNA1.5RTH)  Total Volume for 24 Hours (mL): 720  Continuous  Starting Tube Feed Rate {mL per Hour}: 30  Until Goal Tube Feed Rate (mL per Hour): 30  Tube Feed Duration (in Hours): 24  Tube Feed Start Time: 23:00 (11-25-24 @ 22:52) [Active]

## 2024-11-26 NOTE — PROGRESS NOTE ADULT - SUBJECTIVE AND OBJECTIVE BOX
Follow Up:      Interval History:    REVIEW OF SYSTEMS  [  ] ROS unobtainable because:    [  ] All other systems negative except as noted below    Constitutional:  [ ] fever [ ] chills  [ ] weight loss  [ ] weakness  Skin:  [ ] rash [ ] phlebitis	  Eyes: [ ] icterus [ ] pain  [ ] discharge	  ENMT: [ ] sore throat  [ ] thrush [ ] ulcers [ ] exudates  Respiratory: [ ] dyspnea [ ] hemoptysis [ ] cough [ ] sputum	  Cardiovascular:  [ ] chest pain [ ] palpitations [ ] edema	  Gastrointestinal:  [ ] nausea [ ] vomiting [ ] diarrhea [ ] constipation [ ] pain	  Genitourinary:  [ ] dysuria [ ] frequency [ ] hematuria [ ] discharge [ ] flank pain  [ ] incontinence  Musculoskeletal:  [ ] myalgias [ ] arthralgias [ ] arthritis  [ ] back pain  Neurological:  [ ] headache [ ] seizures  [ ] confusion/altered mental status    Allergies  No Known Allergies        ANTIMICROBIALS:  fluconAZOLE IVPB 400 every 24 hours  linezolid  IVPB    linezolid  IVPB 600 every 12 hours  meropenem  IVPB 1000 every 8 hours  valGANciclovir 50 mG/mL Oral Solution 450 <User Schedule>      OTHER MEDS:  MEDICATIONS  (STANDING):  acetaminophen   IVPB .. 500 every 6 hours  albuterol/ipratropium for Nebulization 3 every 6 hours  aspirin  chewable 81 daily  buDESOnide    Inhalation Suspension 0.5 two times a day  cycloSPORINE  , modified (GENGRAF) Solution 50 <User Schedule>  digoxin  Injectable 125 daily  finasteride 5 daily  heparin   Injectable 5000 every 12 hours  HYDROmorphone  Injectable 1 every 3 hours PRN  insulin lispro (ADMELOG) corrective regimen sliding scale  every 6 hours  insulin NPH human recombinant 6 every 6 hours  levETIRAcetam  Solution 250 two times a day  LORazepam   Injectable 1 every 12 hours  metoprolol tartrate 50 every 8 hours  pantoprazole  Injectable 40 daily  polyethylene glycol 3350 17 every 24 hours  predniSONE   Tablet 20 daily  senna Syrup 10 every 24 hours  sodium chloride 3%  Inhalation 4 every 6 hours  ursodiol Suspension 300 every 12 hours      Vital Signs Last 24 Hrs  T(C): 37.9 (2024 11:00), Max: 38.1 (2024 23:00)  T(F): 100.2 (2024 11:00), Max: 100.6 (2024 23:00)  HR: 122 (2024 12:00) (92 - 125)  BP: 133/60 (2024 12:00) (93/59 - 146/65)  BP(mean): 84 (2024 12:00) (71 - 95)  RR: 24 (2024 12:00) (10 - 25)  SpO2: 100% (2024 12:00) (98% - 100%)    Parameters below as of 2024 12:48      O2 Concentration (%): 30    PHYSICAL EXAMINATION:  General: Alert and Awake, NAD  HEENT: PERRL, EOMI  Neck: Supple  Cardiac: RRR, No M/R/G  Resp: CTAB, No Wh/Rh/Ra  Abdomen: NBS, NT/ND, No HSM, No rigidity or guarding  MSK: No LE edema. No Calf tenderness  : No nichols  Skin: No rashes or lesions. Skin is warm and dry to the touch.   Neuro: Alert and Awake. CN 2-12 Grossly intact. Moves all four extremities spontaneously.  Psych: Calm, Pleasant, Cooperative                          10.4   19.95 )-----------( 227      ( 2024 06:27 )             34.0           143  |  110[H]  |  59[H]  ----------------------------<  183[H]  3.9   |  22  |  1.33[H]    Ca    7.3[L]      2024 06:27  Phos  2.1       Mg     2.4         TPro  4.3[L]  /  Alb  2.3[L]  /  TBili  0.6  /  DBili  x   /  AST  36  /  ALT  48[H]  /  AlkPhos  173[H]        Urinalysis Basic - ( 2024 06:27 )    Color: x / Appearance: x / SG: x / pH: x  Gluc: 183 mg/dL / Ketone: x  / Bili: x / Urobili: x   Blood: x / Protein: x / Nitrite: x   Leuk Esterase: x / RBC: x / WBC x   Sq Epi: x / Non Sq Epi: x / Bacteria: x        MICROBIOLOGY:  v  Bronchial  24   No growth  --  --      .Blood BLOOD  24   No growth at 24 hours  --  --      .Blood BLOOD  24   No growth at 24 hours  --  --      Combi-Cath  24   Commensal charity consistent with body site  --    Moderate polymorphonuclear leukocytes per low power field  No squamous epithelial cells per low power field  No organisms seen      .Blood BLOOD  24   No growth at 72 Hours  --  --      .Blood BLOOD  24   No growth at 5 days  --  --      .Blood BLOOD  24   No growth at 5 days  --  --      Body Fluid  11-15-24   No growth at 5 days  --    No polymorphonuclear leukocytes seen  No organisms seen  by cytocentrifuge        Toxoplasma IgG Screen: 78.00 IU/mL (11-15-24 @ 00:41)  CMV IgG Antibody: 1.50 U/mL (11-15-24 @ 00:41)    Rapid RVP Result: NotDetec ( @ 14:53)  CMVPCR Lo.16 [id number]/mL ( @ 11:24)        RADIOLOGY:    <The imaging below has been reviewed and visualized by me independently. Findings as detailed in report below> Follow Up:  fever    Interval History: fever curve improving. no other acute events     REVIEW OF SYSTEMS  [ x ] ROS unobtainable because:  intubated / sedated  [  ] All other systems negative except as noted below    Constitutional:  [ ] fever [ ] chills  [ ] weight loss  [ ] weakness  Skin:  [ ] rash [ ] phlebitis	  Eyes: [ ] icterus [ ] pain  [ ] discharge	  ENMT: [ ] sore throat  [ ] thrush [ ] ulcers [ ] exudates  Respiratory: [ ] dyspnea [ ] hemoptysis [ ] cough [ ] sputum	  Cardiovascular:  [ ] chest pain [ ] palpitations [ ] edema	  Gastrointestinal:  [ ] nausea [ ] vomiting [ ] diarrhea [ ] constipation [ ] pain	  Genitourinary:  [ ] dysuria [ ] frequency [ ] hematuria [ ] discharge [ ] flank pain  [ ] incontinence  Musculoskeletal:  [ ] myalgias [ ] arthralgias [ ] arthritis  [ ] back pain  Neurological:  [ ] headache [ ] seizures  [ ] confusion/altered mental status    Allergies  No Known Allergies        ANTIMICROBIALS:  fluconAZOLE IVPB 400 every 24 hours  linezolid  IVPB    linezolid  IVPB 600 every 12 hours  meropenem  IVPB 1000 every 8 hours  valGANciclovir 50 mG/mL Oral Solution 450 <User Schedule>      OTHER MEDS:  MEDICATIONS  (STANDING):  acetaminophen   IVPB .. 500 every 6 hours  albuterol/ipratropium for Nebulization 3 every 6 hours  aspirin  chewable 81 daily  buDESOnide    Inhalation Suspension 0.5 two times a day  cycloSPORINE  , modified (GENGRAF) Solution 50 <User Schedule>  digoxin  Injectable 125 daily  finasteride 5 daily  heparin   Injectable 5000 every 12 hours  HYDROmorphone  Injectable 1 every 3 hours PRN  insulin lispro (ADMELOG) corrective regimen sliding scale  every 6 hours  insulin NPH human recombinant 6 every 6 hours  levETIRAcetam  Solution 250 two times a day  LORazepam   Injectable 1 every 12 hours  metoprolol tartrate 50 every 8 hours  pantoprazole  Injectable 40 daily  polyethylene glycol 3350 17 every 24 hours  predniSONE   Tablet 20 daily  senna Syrup 10 every 24 hours  sodium chloride 3%  Inhalation 4 every 6 hours  ursodiol Suspension 300 every 12 hours      Vital Signs Last 24 Hrs  T(C): 37.9 (2024 11:00), Max: 38.1 (2024 23:00)  T(F): 100.2 (2024 11:00), Max: 100.6 (2024 23:00)  HR: 122 (2024 12:00) (92 - 125)  BP: 133/60 (2024 12:00) (93/59 - 146/65)  BP(mean): 84 (2024 12:00) (71 - 95)  RR: 24 (2024 12:00) (10 - 25)  SpO2: 100% (2024 12:00) (98% - 100%)    Parameters below as of 2024 12:48      O2 Concentration (%): 30    PHYSICAL EXAMINATION:  General: Intubated and Sedated  HEENT: +ETT  Neck: Supple  Cardiac: RRR, No M/R/G  Resp: CTAB, No Wh/Rh/Ra  Abdomen: NBS, NT/ND, No HSM, No rigidity or guarding  MSK: No LE edema. No Calf tenderness  Skin: No rashes or lesions. Skin is warm and dry to the touch.  Neuro: Intubated and Sedated  Psych: Unable to assess - intubated and sedated                          10.4   19.95 )-----------( 227      ( 2024 06:27 )             34.0           143  |  110[H]  |  59[H]  ----------------------------<  183[H]  3.9   |  22  |  1.33[H]    Ca    7.3[L]      2024 06:27  Phos  2.1       Mg     2.4         TPro  4.3[L]  /  Alb  2.3[L]  /  TBili  0.6  /  DBili  x   /  AST  36  /  ALT  48[H]  /  AlkPhos  173[H]        Urinalysis Basic - ( 2024 06:27 )    Color: x / Appearance: x / SG: x / pH: x  Gluc: 183 mg/dL / Ketone: x  / Bili: x / Urobili: x   Blood: x / Protein: x / Nitrite: x   Leuk Esterase: x / RBC: x / WBC x   Sq Epi: x / Non Sq Epi: x / Bacteria: x        MICROBIOLOGY:  v  Bronchial  24   No growth  --  --      .Blood BLOOD  24   No growth at 24 hours  --  --      .Blood BLOOD  24   No growth at 24 hours  --  --      Combi-Cath  24   Commensal charity consistent with body site  --    Moderate polymorphonuclear leukocytes per low power field  No squamous epithelial cells per low power field  No organisms seen      .Blood BLOOD  24   No growth at 72 Hours  --  --      .Blood BLOOD  24   No growth at 5 days  --  --      .Blood BLOOD  24   No growth at 5 days  --  --      Body Fluid  11-15-24   No growth at 5 days  --    No polymorphonuclear leukocytes seen  No organisms seen  by cytocentrifuge        Toxoplasma IgG Screen: 78.00 IU/mL (11-15-24 @ 00:41)  CMV IgG Antibody: 1.50 U/mL (11-15-24 @ 00:41)    Rapid RVP Result: NotDetec ( @ 14:53)  CMVPCR Lo.16 [id number]/mL ( @ 11:24)        RADIOLOGY:    <The imaging below has been reviewed and visualized by me independently. Findings as detailed in report below>    < from: US Trans Liver W/ Doppler (24 @ 10:59) >  IMPRESSION:    Transplant liver with patent hepatic vasculature.    Complex collection within the gallbladder fossa, decreased in size from   2024.    < end of copied text >

## 2024-11-26 NOTE — PROGRESS NOTE ADULT - SUBJECTIVE AND OBJECTIVE BOX
HPI:  Patient seen and examined at bedside in SICU.  Rate controlled atrial fibrillation.    Review Of Systems:     Unable to assess       Medications:  acetaminophen   IVPB .. 500 milliGRAM(s) IV Intermittent every 6 hours PRN  albuterol/ipratropium for Nebulization 3 milliLiter(s) Nebulizer every 6 hours  aspirin  chewable 81 milliGRAM(s) Enteral Tube daily  buDESOnide    Inhalation Suspension 0.5 milliGRAM(s) Inhalation two times a day  calcium carbonate 1250 mG  + Vitamin D (OsCal 500 + D) 1 Tablet(s) Oral every 12 hours  chlorhexidine 2% Cloths 1 Application(s) Topical <User Schedule>  cycloSPORINE  , modified (GENGRAF) Solution 50 milliGRAM(s) Enteral Tube <User Schedule>  digoxin  Injectable 125 MICROGram(s) IV Push daily  finasteride 5 milliGRAM(s) Oral daily  fluconAZOLE IVPB 400 milliGRAM(s) IV Intermittent every 24 hours  heparin   Injectable 5000 Unit(s) SubCutaneous every 12 hours  HYDROmorphone  Injectable 1 milliGRAM(s) IV Push every 3 hours PRN  insulin lispro (ADMELOG) corrective regimen sliding scale   SubCutaneous every 6 hours  insulin NPH human recombinant 6 Unit(s) SubCutaneous every 6 hours  meropenem  IVPB 1000 milliGRAM(s) IV Intermittent every 8 hours  metoprolol tartrate 50 milliGRAM(s) Enteral Tube every 8 hours  multiple electrolytes Injection Type 1 1000 milliLiter(s) IV Continuous <Continuous>  pantoprazole  Injectable 40 milliGRAM(s) IV Push daily  polyethylene glycol 3350 17 Gram(s) Oral every 24 hours  predniSONE   Tablet 20 milliGRAM(s) Oral daily  senna Syrup 10 milliLiter(s) Oral every 24 hours  sodium chloride 3%  Inhalation 4 milliLiter(s) Inhalation every 6 hours  ursodiol Suspension 300 milliGRAM(s) Oral every 12 hours  valGANciclovir 50 mG/mL Oral Solution 450 milliGRAM(s) Oral <User Schedule>    PAST MEDICAL & SURGICAL HISTORY:  Diabetes      Transaminitis      Paroxysmal atrial fibrillation      Depression      BPH (benign prostatic hyperplasia)      Hypertension      Chronic atrial fibrillation      Coronary artery disease      Hepatocellular carcinoma      DM (diabetes mellitus)      HTN (hypertension)      Paroxysmal atrial fibrillation      Cirrhosis      HCC (hepatocellular carcinoma)      History of BPH      History of laparoscopic cholecystectomy      History of lumbar laminectomy      H/O prior ablation treatment      H/O percutaneous left heart catheterization        Vitals:  T(C): 36.7 (11-26-24 @ 19:00), Max: 38.1 (11-25-24 @ 23:00)  HR: 96 (11-26-24 @ 20:00) (86 - 125)  BP: 146/70 (11-26-24 @ 20:00) (93/59 - 146/70)  BP(mean): 98 (11-26-24 @ 20:00) (71 - 98)  RR: 21 (11-26-24 @ 20:00) (10 - 35)  SpO2: 99% (11-26-24 @ 20:00) (99% - 100%)  Wt(kg): --  Daily     Daily   I&O's Summary    25 Nov 2024 07:01  -  26 Nov 2024 07:00  --------------------------------------------------------  IN: 2557.2 mL / OUT: 2445 mL / NET: 112.2 mL    26 Nov 2024 07:01  -  26 Nov 2024 20:39  --------------------------------------------------------  IN: 1619.8 mL / OUT: 1230 mL / NET: 389.8 mL        Physical Exam:  Appearance: Intubated, sedated  Eyes: PERRL, EOMI, pink conjunctiva  HENT: +ET tube  Cardiovascular: irregular  Respiratory: Clear to auscultation bilaterally  Gastrointestinal: soft, non-tender, non-distended with normal bowel sounds  Musculoskeletal: No clubbing; no joint deformity                           10.4   19.95 )-----------( 227      ( 26 Nov 2024 06:27 )             34.0     11-26    143  |  110[H]  |  59[H]  ----------------------------<  183[H]  3.9   |  22  |  1.33[H]    Ca    7.3[L]      26 Nov 2024 06:27  Phos  2.1     11-26  Mg     2.4     11-26    TPro  4.3[L]  /  Alb  2.3[L]  /  TBili  0.6  /  DBili  x   /  AST  36  /  ALT  48[H]  /  AlkPhos  173[H]  11-26    PT/INR - ( 26 Nov 2024 06:27 )   PT: 15.7 sec;   INR: 1.37 ratio         PTT - ( 26 Nov 2024 06:27 )  PTT:26.2 sec          Cardiovascular Diagnostic Testing:  ECG: sinus rhythm    Echo: < from: Intra-Operative Transesophageal Echo W or WO Ultrasound Enhancing Agent (11.15.24 @ 07:46) >  --------------------------------------------------------------------------------  PRE-BYPASS FINDINGS     Left Ventricle:  Left ventricular ejection fraction is estimated at 60 to 65%. Normal left ventricularwall thickness. The left ventricular systolic function is normal There are no regional wall motion abnormalities seen.     Right Ventricle:  The right ventricular cavity is normal in size, with normal wall thickness and right ventricular systolic function is normal. Right ventricular systolic function is normal.     Left Atrium:  The left atrium is normal in size. No thrombus visualized in left atrial appendage.     Right Atrium:  The right atrium is normal in size. The right atrium is normal in size.     Interatrial Septum:  No PFO visualized with color flow doppler.     Aortic Valve:  The aortic valve appears trileaflet. There is no aortic valve stenosis. There is trace aortic regurgitation.     Mitral Valve:  There is no mitral valve stenosis. There is no mitral valve stenosis. There is trace mitral regurgitation.     Tricuspid Valve:  There is no evidence of tricuspid stenosis. There is trace tricuspid regurgitation.     Pericardium:  No pericardial effusion seen.     Post-Bypass:  S/P OLT. Under current loading conditions, hyperdynamic left ventricular systolic function, EF: 70-75% by visual estimate, no RWMA. Normal right ventricular systolic function. All other findings unchanged. Probe removed atraumatically, no blood. The patient tolerated the procedure well and without complications. Permanent recorded images are stored in the medical record.     Electronically signed by James Villareal on 11/15/2024 at 2:18:16 PM         *** Final ***    < end of copied text >      Stress Testing:     Cath: 4/24/2024, patient had his LHC repeated with Ben Villareal MD at [facility].  Cath showed multivessel coronary artery disease, but the lesions previously noted were FFR negative.  He continues to have MIRTA 3 flow throughout.    Interpretation of Telemetry: AFib with RVR    Imaging:

## 2024-11-26 NOTE — AIRWAY REMOVAL NOTE  ADULT & PEDS - RESPIRATORY EXPANSION/ACCESSORY MUSCLES/RETRACTIONS
no use of accessory muscles/expansion symmetric
no use of accessory muscles/expansion symmetric
no use of accessory muscles/no retractions/expansion symmetric

## 2024-11-26 NOTE — PROGRESS NOTE ADULT - ASSESSMENT
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 Post-op course c/b worsening mental status and re-intubation 11/20 for acute hypoxemic respiratory failure 2/2 aspiration.     PLAN:  Neuro:  - Sedation with low dose propofol gtt (holding precedex iso persistent fevers)  - pain control w/ Tylenol 500mg q6, prn Dilaudid   - takes xanax 2mg TID at home  - Benzo taper: Ativan 1mg TID x3d --> 1mg BID x3d  - CT head 11/19 and 11/21 negative  - CT head 11/25 - negative   - Keppra restarted: 500mg BID  - Holding home xanax, Abilify, Zoloft, Remeron, Lexapro   - Ammonia 23     Resp:  - Intubated 11/20 2/2 acute hypoxemic respiratory failure 2/2 aspiration   - Extubated 11/24 and reintubated 11/24 d/t tachypnea   - PRVC 14/450/5/40  - Trial SBT   - CT chest 11/25: Groundglass opacities in the right lower and left upper lobes, possibly infectious in nature.  - budesonide, duonebs, hypertonic saline    CV:  - goal MAP > 65 mmHg  - lactate clear  - Metoprolol 50mg q8  - Dig 125 qd  - f/u dig level (11/26)    GI:   - NPO w/ NGT - Advance TF to 30  - Bowel regimen: Miralax and Senna  - Protonix for stress ulcer prophylaxis  - ALYSSA x1, monitor output  - post-op liver doppler w/ patent vasculature     Renal:  - Monitor I&Os and electrolytes w/ repletions as necessary  - Plasmalyte @30 for MORAIMA   - Nichols  - Finasteride     Heme:  - Monitor CBC and coags  - SQH BID for VTE prophylaxis  - ASA PO  - SCDs  - RUE duplex neg 11/24    ID:   - Empiric ABX w/ Christopher (11/22 - ) and Linezolid (11/23 - )   - Transplant ppx fluconazole  - immunosuppression w/ steroids, tacro and Cellcept   - Blood cx 11/20 NGTD, 11/22 NGTD, 11/24 NGTD  - F/u BAL 11/24   - Procal 0.21 -> 0.18 --> 0.32  - F/u fungal labs     Endo:   - Monitor glucose   - NPH 6 q6  - moderate ISS  - post-transplant steroid taper     Lines:   - L radial A line (11/24 - )  - midline LUE (11/19 - )  - nichols (11/20 - )  - PIVs  - NGT  - ALYSSA x1    Dispo: SICU

## 2024-11-27 NOTE — BH CONSULTATION LIAISON PROGRESS NOTE - NSBHASSESSMENTFT_PSY_ALL_CORE
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 Patient seen by transplant psychiatry for concerns of anxiety post transplant. Patient on exam noted a hx of low mood, anxiety secondary to recent health problems 3 months ago, with need for liver transplant, of which he has been followed by Dr. Hobbs outpatient for management for his mood and anxiety. He noted post operatively experiencing symptoms, anxiety, with anxious ruminations, difficulty with sleep as well as  with feelings of guilt regarding organ from decreased donor.  He denied overt symptoms of panic attacks, lara, AH/Vh/HI/SI, intent or plan. Patient educated at bedside regarding medication regiment, including recommendations for both acute anxiety alleviation as well as sleep. Patient verbalized agreement and understanding with plan proposed.     11/19:Patient on exam A/Ox2, noted to be delirious, denied any AH/VH/HI/SI, intent or plan, educated about medication regiment.    11/21:Patient on exam intubated, collateral from Son at bedside, noted patient has been using Xanax more than prescribed amount on ISTOP.    11/29:Patient on exam, noted continued difficulty sleeping, will increase Seroquel to address sleep at this time, educated regarding medications, patient denied any Ah/vh/Hi/Si, intent or plan.     Plan  -Can consider increasing Seroquel to 75mg PO HS if QTC < 500 for sleep  -C/W Seroquel 12.5mg PO TID PRN for acute anxiety alleviation if QTC < 500  -C/W Ativan 0.5mg PO TID PRN for acute anxiety alleviation, refractory to use of Seroquel for anxiety alleviation  -Please HOLD: Wellbutrin, Lexapro, Abilify, Remeron given patient altered mental status   -Transplant psychiatry will continue to follow patient

## 2024-11-27 NOTE — PROGRESS NOTE ADULT - SUBJECTIVE AND OBJECTIVE BOX
Follow Up:      Interval History:    REVIEW OF SYSTEMS  [  ] ROS unobtainable because:    [  ] All other systems negative except as noted below    Constitutional:  [ ] fever [ ] chills  [ ] weight loss  [ ] weakness  Skin:  [ ] rash [ ] phlebitis	  Eyes: [ ] icterus [ ] pain  [ ] discharge	  ENMT: [ ] sore throat  [ ] thrush [ ] ulcers [ ] exudates  Respiratory: [ ] dyspnea [ ] hemoptysis [ ] cough [ ] sputum	  Cardiovascular:  [ ] chest pain [ ] palpitations [ ] edema	  Gastrointestinal:  [ ] nausea [ ] vomiting [ ] diarrhea [ ] constipation [ ] pain	  Genitourinary:  [ ] dysuria [ ] frequency [ ] hematuria [ ] discharge [ ] flank pain  [ ] incontinence  Musculoskeletal:  [ ] myalgias [ ] arthralgias [ ] arthritis  [ ] back pain  Neurological:  [ ] headache [ ] seizures  [ ] confusion/altered mental status    Allergies  No Known Allergies        ANTIMICROBIALS:  fluconAZOLE IVPB 400 every 24 hours  meropenem  IVPB 1000 every 8 hours  valGANciclovir 50 mG/mL Oral Solution 450 once      OTHER MEDS:  MEDICATIONS  (STANDING):  acetaminophen   IVPB .. 500 every 6 hours PRN  aspirin  chewable 81 daily  buDESOnide    Inhalation Suspension 0.5 two times a day  cycloSPORINE  , modified (GENGRAF) Solution 50 <User Schedule>  digoxin  Injectable 125 daily  finasteride 5 daily  heparin   Injectable 5000 every 12 hours  HYDROmorphone  Injectable 1 every 3 hours PRN  insulin lispro (ADMELOG) corrective regimen sliding scale  every 6 hours  insulin NPH human recombinant 10 every 6 hours  levETIRAcetam  Solution 250 every 12 hours  metoprolol tartrate 50 every 8 hours  oxyCODONE    Solution 5 every 4 hours PRN  oxyCODONE    Solution 10 every 4 hours PRN  pantoprazole  Injectable 40 daily  polyethylene glycol 3350 17 every 24 hours  predniSONE   Tablet 20 daily  QUEtiapine 25 <User Schedule>  senna Syrup 10 every 24 hours  ursodiol Suspension 300 every 12 hours      Vital Signs Last 24 Hrs  T(C): 36.5 (2024 11:00), Max: 36.9 (2024 23:00)  T(F): 97.7 (2024 11:00), Max: 98.5 (2024 23:00)  HR: 86 (2024 14:00) (80 - 130)  BP: 178/70 (2024 14:00) (106/53 - 198/84)  BP(mean): 101 (2024 14:00) (71 - 120)  RR: 24 (2024 14:00) (18 - 37)  SpO2: 96% (2024 14:00) (95% - 100%)    Parameters below as of 2024 11:00  Patient On (Oxygen Delivery Method): room air        PHYSICAL EXAMINATION:  General: Alert and Awake, NAD  HEENT: PERRL, EOMI  Neck: Supple  Cardiac: RRR, No M/R/G  Resp: CTAB, No Wh/Rh/Ra  Abdomen: NBS, NT/ND, No HSM, No rigidity or guarding  MSK: No LE edema. No Calf tenderness  : No nichols  Skin: No rashes or lesions. Skin is warm and dry to the touch.   Neuro: Alert and Awake. CN 2-12 Grossly intact. Moves all four extremities spontaneously.  Psych: Calm, Pleasant, Cooperative                          9.6    14.96 )-----------( 189      ( 2024 06:34 )             32.7           148[H]  |  115[H]  |  54[H]  ----------------------------<  254[H]  4.1   |  20[L]  |  1.27    Ca    7.4[L]      2024 06:34  Phos  3.3       Mg     2.3         TPro  3.7[L]  /  Alb  2.1[L]  /  TBili  0.5  /  DBili  x   /  AST  23  /  ALT  33  /  AlkPhos  128[H]        Urinalysis Basic - ( 2024 06:34 )    Color: x / Appearance: x / SG: x / pH: x  Gluc: 254 mg/dL / Ketone: x  / Bili: x / Urobili: x   Blood: x / Protein: x / Nitrite: x   Leuk Esterase: x / RBC: x / WBC x   Sq Epi: x / Non Sq Epi: x / Bacteria: x        MICROBIOLOGY:  v  .Blood BLOOD  24   No growth at 24 hours  --  --      .Blood BLOOD  24   No growth at 24 hours  --  --      Bronchial  11-24-24   Few Candida glabrata  Susceptibility to follow.  --  --      .Blood BLOOD  24   No growth at 72 Hours  --  --      .Blood BLOOD  24   No growth at 72 Hours  --  --      Combi-Cath  24   Commensal charity consistent with body site  --    Moderate polymorphonuclear leukocytes per low power field  No squamous epithelial cells per low power field  No organisms seen      .Blood BLOOD  24   No growth at 4 days  --  --      .Blood BLOOD  24   No growth at 5 days  --  --      .Blood BLOOD  24   No growth at 5 days  --  --      Body Fluid  11-15-24   No growth at 5 days  --    No polymorphonuclear leukocytes seen  No organisms seen  by cytocentrifuge        Toxoplasma IgG Screen: 78.00 IU/mL (11-15-24 @ 00:41)  CMV IgG Antibody: 1.50 U/mL (11-15-24 @ 00:41)    Rapid RVP Result: NotDetec ( @ 14:53)  CMVPCR Lo.16 Pfm59ZR/mL ( @ 11:24)        RADIOLOGY:    <The imaging below has been reviewed and visualized by me independently. Findings as detailed in report below> Follow Up:  fever    Interval History: afebrile overnight. extubated.     REVIEW OF SYSTEMS  [  ] ROS unobtainable because:    [ x ] All other systems negative except as noted below    Constitutional:  [ ] fever [ ] chills  [ ] weight loss  [ ] weakness  Skin:  [ ] rash [ ] phlebitis	  Eyes: [ ] icterus [ ] pain  [ ] discharge	  ENMT: [ ] sore throat  [ ] thrush [ ] ulcers [ ] exudates  Respiratory: [ ] dyspnea [ ] hemoptysis [ ] cough [ ] sputum	  Cardiovascular:  [ ] chest pain [ ] palpitations [ ] edema	  Gastrointestinal:  [ ] nausea [ ] vomiting [ ] diarrhea [ ] constipation [ ] pain	  Genitourinary:  [ ] dysuria [ ] frequency [ ] hematuria [ ] discharge [ ] flank pain  [ ] incontinence  Musculoskeletal:  [ ] myalgias [ ] arthralgias [ ] arthritis  [ ] back pain  Neurological:  [ ] headache [ ] seizures  [x ] confusion/altered mental status    Allergies  No Known Allergies        ANTIMICROBIALS:  fluconAZOLE IVPB 400 every 24 hours  meropenem  IVPB 1000 every 8 hours  valGANciclovir 50 mG/mL Oral Solution 450 once      OTHER MEDS:  MEDICATIONS  (STANDING):  acetaminophen   IVPB .. 500 every 6 hours PRN  aspirin  chewable 81 daily  buDESOnide    Inhalation Suspension 0.5 two times a day  cycloSPORINE  , modified (GENGRAF) Solution 50 <User Schedule>  digoxin  Injectable 125 daily  finasteride 5 daily  heparin   Injectable 5000 every 12 hours  HYDROmorphone  Injectable 1 every 3 hours PRN  insulin lispro (ADMELOG) corrective regimen sliding scale  every 6 hours  insulin NPH human recombinant 10 every 6 hours  levETIRAcetam  Solution 250 every 12 hours  metoprolol tartrate 50 every 8 hours  oxyCODONE    Solution 5 every 4 hours PRN  oxyCODONE    Solution 10 every 4 hours PRN  pantoprazole  Injectable 40 daily  polyethylene glycol 3350 17 every 24 hours  predniSONE   Tablet 20 daily  QUEtiapine 25 <User Schedule>  senna Syrup 10 every 24 hours  ursodiol Suspension 300 every 12 hours      Vital Signs Last 24 Hrs  T(C): 36.5 (2024 11:00), Max: 36.9 (2024 23:00)  T(F): 97.7 (2024 11:00), Max: 98.5 (2024 23:00)  HR: 86 (2024 14:00) (80 - 130)  BP: 178/70 (2024 14:00) (106/53 - 198/84)  BP(mean): 101 (2024 14:00) (71 - 120)  RR: 24 (2024 14:00) (18 - 37)  SpO2: 96% (2024 14:00) (95% - 100%)    Parameters below as of 2024 11:00  Patient On (Oxygen Delivery Method): room air        PHYSICAL EXAMINATION:  General: Alert and Awake, NAD  Cardiac: RRR, No M/R/G  Resp: CTAB, No Wh/Rh/Ra  Abdomen: NBS, NT/ND, No HSM, No rigidity or guarding  MSK: No LE edema. No Calf tenderness  Skin: No rashes or lesions. Skin is warm and dry to the touch.   Neuro: Alert and Awake. CN 2-12 Grossly intact. Moves all four extremities spontaneously.  Psych: Encephalopathic - unable to assess                          9.6    14.96 )-----------( 189      ( 2024 06:34 )             32.7           148[H]  |  115[H]  |  54[H]  ----------------------------<  254[H]  4.1   |  20[L]  |  1.27    Ca    7.4[L]      2024 06:34  Phos  3.3       Mg     2.3         TPro  3.7[L]  /  Alb  2.1[L]  /  TBili  0.5  /  DBili  x   /  AST  23  /  ALT  33  /  AlkPhos  128[H]        Urinalysis Basic - ( 2024 06:34 )    Color: x / Appearance: x / SG: x / pH: x  Gluc: 254 mg/dL / Ketone: x  / Bili: x / Urobili: x   Blood: x / Protein: x / Nitrite: x   Leuk Esterase: x / RBC: x / WBC x   Sq Epi: x / Non Sq Epi: x / Bacteria: x        MICROBIOLOGY:  v  .Blood BLOOD  24   No growth at 24 hours  --  --      .Blood BLOOD  24   No growth at 24 hours  --  --      Bronchial  24   Few Candida glabrata  Susceptibility to follow.  --  --      .Blood BLOOD  24   No growth at 72 Hours  --  --      .Blood BLOOD  24   No growth at 72 Hours  --  --      Combi-Cath  24   Commensal charity consistent with body site  --    Moderate polymorphonuclear leukocytes per low power field  No squamous epithelial cells per low power field  No organisms seen      .Blood BLOOD  24   No growth at 4 days  --  --      .Blood BLOOD  24   No growth at 5 days  --  --      .Blood BLOOD  24   No growth at 5 days  --  --      Body Fluid  11-15-24   No growth at 5 days  --    No polymorphonuclear leukocytes seen  No organisms seen  by cytocentrifuge        Toxoplasma IgG Screen: 78.00 IU/mL (11-15-24 @ 00:41)  CMV IgG Antibody: 1.50 U/mL (11-15-24 @ 00:41)    Rapid RVP Result: NotDetec ( @ 14:53)  CMVPCR Lo.16 Wzw71GV/mL ( @ 11:24)        RADIOLOGY:    <The imaging below has been reviewed and visualized by me independently. Findings as detailed in report below>    < from: [facility] Duplex Upper Ext Vein Scan, Left (24 @ 16:43) >  IMPRESSION:  No evidence of left upper extremity deep venous thrombosis.  Left basilic superficial thrombophlebitis.    < end of copied text >

## 2024-11-27 NOTE — PROGRESS NOTE ADULT - ASSESSMENT
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 Pt underwent Y90 Sept, 2023 with favorable treatment response.s/p OLT 11/15/24     # S/p OLT 11/15/24 CMV D?R? EBV , toxo D?/R?  VAlcyte, bactrim ppx  fluconazole ppx    # Fever, sepsis, hypoxic respiratory failure s/p on mechanical ventilation  Ct C/A/P Bilateral lower lobe consolidation with fluid and debris in the right lower lobe bronchi, concerning for aspiration pneumonia.  CMV PCR (11/24) 146 (low level CMV viremia - not likely contributing)  Adenovirus PCR (11/24) Negative  Cryptococcal Serum Ag (11/24) Negative  WNV Serology and Serum PCR Negative  CT Chest (11/25) Groundglass opacities in the right lower and left upper lobes, possibly infectious in nature.  CT A/P (11/25) No acute process    # ileus  no diarrhea    Recommend  Continue Meropenem 1 gram q 8hr (11/23 >)  if BAl cx remain with no growth, will change jeniffer to aztreonam and reassess fevers   follow WNV IGG/IGM and Serum PCR  follow serum and bronch aspergillus galactomannan   follow bartonella PCR  follow tick borne panel PCR  follow bronchoscopy cultures    I will continue to follow. Please feel free to contact me with any further questions.    Kyle Junior M.D.  [facility] Division of Infectious Disease  8AM-5PM Monday - Friday: Available on [employer] Teams  After Hours and Holidays (or if no response on [employer] Teams): Please contact the Infectious Diseases Office at [phone]    The above assessment and plan were discussed with SICU Team

## 2024-11-27 NOTE — ADVANCED PRACTICE NURSE CONSULT - RECOMMEDATIONS
Impression:     B/L buttocks/sacral deep tissue injury in evolution, present on admission   right anterior knee abrasion with intact scab  right lateral knee abrasion with intact scab  left hip ecchymosis  right axilla/posterior arm ecchymosis    fecal incontinence   wounds of unknown origin on abdomen- possible tape related injuries     Recommendations:     1) turn and position q2 and PRN utilizing offloading assistive devices  2) routine pericare daily and PRN soiling  3) encourage optimal nutrition  4) waffle cushion when oob to chair  5) B/L LE complete cair air fluidized boots or hattie-lock pillow to offload heels/feet  6) triad protective barrier cream to B/L buttocks/sacrum daily and PRN soiling  7) incontinence management - consider external urinary catheter to divert urine from skin if incontinent  8) right anterior/lateral knee and right forehead abrasion - cleanse skin and pat dry then apply cavilon to skin daily    9) sween 24 moisturizer to dry skin daily   10) abdominal wounds possibly related to skin stripping from tape - cleanse skin and pat dry then apply small amount of triad over area- do not apply cover dressing with tape! leave open to air please    Plan discussed with SHIVANI Cano at bedside    For questions/comments regarding the recommendations in this consult, please contact Laura Herrera via teams. Wound care will not actively follow, please place future consults for new concerns. Thank you!

## 2024-11-27 NOTE — BH CONSULTATION LIAISON PROGRESS NOTE - NSBHFUPINTERVALHXFT_PSY_A_CORE
Patient seen at bedside, with attending, patient noted feeling "a little confused" was A/Ox2-3 on exam. Patient endorsed that has felt "anxiety" secondary to discomfort stating that he has felt thirsty, and uncomfortable. Patient denied any Ah/VH/Hi/SI, intent or plan at time of exam.  When asked about Xanax use patient stated that he has been using 4mg of xanax daily for the last "5-6 months." Patient educated about medication regiment at time of exam, verbalized understanding and agreement with plan proposed.

## 2024-11-27 NOTE — BH CONSULTATION LIAISON PROGRESS NOTE - NSBHFUPINTERVALHXFT_PSY_A_CORE
Patient seen at bedside, with attending, patient on exam A/Ox2-3, seen with EEG ongoing. Patient noted that he continues to experience difficulty sleeping at this time, noting feelings of anxious ruminations and pain contributing to difficulty sleeping. Patient denied any Ah/Vh/Hi/SI, intent or plan on exam, educated regarding medication regiment for sleep acute anxiety alleviation, including benzodiazepines, and Seroquel on exam.

## 2024-11-27 NOTE — PROGRESS NOTE ADULT - SUBJECTIVE AND OBJECTIVE BOX
HISTORY  74y Male    24 HOUR EVENTS:  - Extubated 11/26  - Keppra decreased to 250 BID --> family declining  - PO pain meds  - LUE swelling - f/u doppler   - TF advancing to goal @ 55  - d/c plasmalyte - IVL   - will d/c nichols in AM -> f/u TOV      SUBJECTIVE/ROS:  [ ] A ten-point review of systems was otherwise negative except as noted.  [ ] Due to altered mental status/intubation, subjective information were not able to be obtained from the patient. History was obtained, to the extent possible, from review of the chart and collateral sources of information.      NEURO  Exam: awake, alert, oriented  Meds: acetaminophen   IVPB .. 500 milliGRAM(s) IV Intermittent every 6 hours PRN Mild Pain (1 - 3)  HYDROmorphone  Injectable 1 milliGRAM(s) IV Push every 3 hours PRN Breakthrough Pain  oxyCODONE    IR 5 milliGRAM(s) Oral every 4 hours PRN Moderate Pain (4 - 6)  oxyCODONE    IR 10 milliGRAM(s) Oral every 4 hours PRN Severe Pain (7 - 10)    [x] Adequacy of sedation and pain control has been assessed and adjusted      RESPIRATORY  RR: 20 (11-27-24 @ 01:00) (10 - 35)  SpO2: 97% (11-27-24 @ 01:00) (97% - 100%)  Wt(kg): --  Exam: unlabored, clear to auscultation bilaterally  Mechanical Ventilation: Mode: CPAP with PS, RR (patient): 10, FiO2: 30, PEEP: 5, PS: 5, MAP: 8.1  ABG - ( 26 Nov 2024 11:21 )  pH: 7.54  /  pCO2: 30    /  pO2: 157   / HCO3: 26    / Base Excess: 3.5   /  SaO2: 100.0   Lactate: x          [N/A] Extubation Readiness Assessed  Meds: buDESOnide    Inhalation Suspension 0.5 milliGRAM(s) Inhalation two times a day      CARDIOVASCULAR  HR: 98 (11-27-24 @ 01:00) (86 - 122)  BP: 152/68 (11-27-24 @ 01:00) (93/59 - 152/68)  BP(mean): 98 (11-27-24 @ 01:00) (71 - 98)  ABP: --  ABP(mean): --  Wt(kg): --  CVP(cm H2O): --      Exam: regular rate and rhythm  Cardiac Rhythm: sinus  Perfusion     [x]Adequate   [ ]Inadequate  Mentation   [x]Normal       [ ]Reduced  Extremities  [x]Warm         [ ]Cool  Volume Status [ ]Hypervolemic [x]Euvolemic [ ]Hypovolemic  Meds: digoxin  Injectable 125 MICROGram(s) IV Push daily  metoprolol tartrate 50 milliGRAM(s) Enteral Tube every 8 hours        GI/NUTRITION  Exam: soft, nontender, nondistended, incision C/D/I  Diet:  Meds: pantoprazole  Injectable 40 milliGRAM(s) IV Push daily  polyethylene glycol 3350 17 Gram(s) Oral every 24 hours  senna Syrup 10 milliLiter(s) Oral every 24 hours  ursodiol Suspension 300 milliGRAM(s) Oral every 12 hours      GENITOURINARY  I&O's Detail    11-25 @ 07:01 - 11-26 @ 07:00  --------------------------------------------------------  IN:    Enteral Tube Flush: 120 mL    Glucerna 1.5: 220 mL    Glucerna 1.5: 180 mL    IV PiggyBack: 1000 mL    IV PiggyBack: 250 mL    multiple electrolytes Injection Type 1.: 720 mL    Propofol: 67.2 mL  Total IN: 2557.2 mL    OUT:    Bulb (mL): 1150 mL    Indwelling Catheter - Urethral (mL): 1295 mL  Total OUT: 2445 mL    Total NET: 112.2 mL      11-26 @ 07:01 - 11-27 @ 01:24  --------------------------------------------------------  IN:    Enteral Tube Flush: 40 mL    Glucerna 1.5: 320 mL    IV PiggyBack: 400 mL    IV PiggyBack: 649.8 mL    IV PiggyBack: 50 mL    multiple electrolytes Injection Type 1.: 450 mL  Total IN: 1909.8 mL    OUT:    Bulb (mL): 280 mL    Indwelling Catheter - Urethral (mL): 1245 mL    Propofol: 0 mL  Total OUT: 1525 mL    Total NET: 384.8 mL          11-26    143  |  110[H]  |  59[H]  ----------------------------<  183[H]  3.9   |  22  |  1.33[H]    Ca    7.3[L]      26 Nov 2024 06:27  Phos  2.1     11-26  Mg     2.4     11-26    TPro  4.3[L]  /  Alb  2.3[L]  /  TBili  0.6  /  DBili  x   /  AST  36  /  ALT  48[H]  /  AlkPhos  173[H]  11-26    [ ] Nichols catheter, indication: N/A  Meds: calcium carbonate 1250 mG  + Vitamin D (OsCal 500 + D) 1 Tablet(s) Oral every 12 hours        HEMATOLOGIC  Meds: aspirin  chewable 81 milliGRAM(s) Enteral Tube daily  heparin   Injectable 5000 Unit(s) SubCutaneous every 12 hours    [x] VTE Prophylaxis                        10.4   19.95 )-----------( 227      ( 26 Nov 2024 06:27 )             34.0     PT/INR - ( 26 Nov 2024 06:27 )   PT: 15.7 sec;   INR: 1.37 ratio         PTT - ( 26 Nov 2024 06:27 )  PTT:26.2 sec      INFECTIOUS DISEASES  WBC Count: 19.95 K/uL (11-26 @ 06:27)    RECENT CULTURES:  Specimen Source: Bronchial  Date/Time: 11-24 @ 17:25  Culture Results:   No growth  Gram Stain: --  Organism: --  Specimen Source: .Blood BLOOD  Date/Time: 11-24 @ 11:10  Culture Results:   No growth at 48 Hours  Gram Stain: --  Organism: --  Specimen Source: .Blood BLOOD  Date/Time: 11-24 @ 11:05  Culture Results:   No growth at 48 Hours  Gram Stain: --  Organism: --  Specimen Source: Combi-Cath  Date/Time: 11-23 @ 13:09  Culture Results:   Commensal charity consistent with body site  Gram Stain:   Moderate polymorphonuclear leukocytes per low power field  No squamous epithelial cells per low power field  No organisms seen  Organism: --  Specimen Source: .Blood BLOOD  Date/Time: 11-22 @ 17:47  Culture Results:   No growth at 4 days  Gram Stain: --  Organism: --    Meds: cycloSPORINE  , modified (GENGRAF) Solution 50 milliGRAM(s) Enteral Tube <User Schedule>  fluconAZOLE IVPB 400 milliGRAM(s) IV Intermittent every 24 hours  meropenem  IVPB 1000 milliGRAM(s) IV Intermittent every 8 hours  valGANciclovir 50 mG/mL Oral Solution 450 milliGRAM(s) Oral <User Schedule>        ENDOCRINE  CAPILLARY BLOOD GLUCOSE      POCT Blood Glucose.: 163 mg/dL (26 Nov 2024 23:23)  POCT Blood Glucose.: 118 mg/dL (26 Nov 2024 17:06)  POCT Blood Glucose.: 179 mg/dL (26 Nov 2024 11:08)  POCT Blood Glucose.: 152 mg/dL (26 Nov 2024 05:21)    Meds: finasteride 5 milliGRAM(s) Oral daily  insulin lispro (ADMELOG) corrective regimen sliding scale   SubCutaneous every 6 hours  insulin NPH human recombinant 6 Unit(s) SubCutaneous every 6 hours  predniSONE   Tablet 20 milliGRAM(s) Oral daily      CODE STATUS:

## 2024-11-27 NOTE — PROGRESS NOTE ADULT - ASSESSMENT
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 Complex collection within the gallbladder fossa, decreased in size from 11/18/2024.  - watch leukocytosis and fever curve, cultures negative, concern for aspiration PNA and possible ileus on imaging 11/21. Transplant ID following. on Meropenem/linezolid/fluc: pending infectious w/u  - 11/25 CT head neg, CT CAP - R lower, L upper lobe opacities  - watch renal function, change vaclyte to TIW and hold bactrim   - Tube feeds  - Strict I&Os   - ASA/SQH  -250 q6 of free water   - Incr valcyte 900mg    [ ] Immunosuppression  - Cyclo per level, MMF HELD, pred 20  - SIMULECT completed  - Off tacro  - PPx: Valcyte/Bactrim/PPI/OsCal/Fluc    [ ] AMS   -Reintubated 11/20 Aspiration/hypoxia, extubated 11/24->[id number]--> extubated 11/26  -Per family PT was on higher dose Xanax PO, Transplant Psych following  -EEG 11/20: Mild diffuse cerebral dysfunction is nonspecific in etiology. No epileptiform abnormalities or seizures.  -neurology following  -on keppra and ativan taper  -Seroquel 25mg bid  -Psych recs 911/27): Ativan 0.5mg PO TID PRN for acute anxiety alleviation if refractory to Seroquel, -Combine Seroquel  50mg PO HS if QTC < 500 for ICU delirium, consider Seroquel 12.5mg PO TID PRN for acute anxiety alleviation if QTC < 500      [ ] HTN/ pAFib  - BB as able  - EP following, ongoing discussions about AYDEN/Cardioversions/full A/C  - Dr. Quintanilla following  - check Dig level     [ ] DM  -Lantus/Lispro, adjust prn  -Endocrine as needed given steroids

## 2024-11-27 NOTE — PROGRESS NOTE ADULT - SUBJECTIVE AND OBJECTIVE BOX
Transplant Surgery - Multidisciplinary Progress Note   --------------------------------------------------------------  OLT   11/15/2024        POD#12    Present: Patient seen and examined with multidisciplinary Transplant team including Surgeone: Dr. James. Hepatologist: Dr. Purdy, MELINDA Colby/Ascencion, pharmacist, SICU team in AM rounds. Disciplines not in attendance will be notified of the plan.     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    Interval Events:  - Afebrile; confusion, hyperventilation: given precedex and Ativan 1.5 x 1 overnight   - Afib w/ RVR (130s): Metoprolol 5 IVP x 1    Immunosuppression:  -Cyclo per level, MMF HELD, pred 20  -ongoing monitoring for signs of rejection  Potential Discharge date: Pending clinical course     MEDICATIONS  (STANDING):  aspirin  chewable 81 milliGRAM(s) Enteral Tube daily  buDESOnide    Inhalation Suspension 0.5 milliGRAM(s) Inhalation two times a day  calcium carbonate 1250 mG  + Vitamin D (OsCal 500 + D) 1 Tablet(s) Oral every 12 hours  chlorhexidine 2% Cloths 1 Application(s) Topical <User Schedule>  cycloSPORINE  , modified (GENGRAF) Solution 50 milliGRAM(s) Enteral Tube <User Schedule>  digoxin  Injectable 125 MICROGram(s) IV Push daily  finasteride 5 milliGRAM(s) Oral daily  fluconAZOLE IVPB 400 milliGRAM(s) IV Intermittent every 24 hours  heparin   Injectable 5000 Unit(s) SubCutaneous every 12 hours  insulin lispro (ADMELOG) corrective regimen sliding scale   SubCutaneous every 6 hours  insulin NPH human recombinant 10 Unit(s) SubCutaneous every 6 hours  levETIRAcetam  Solution 250 milliGRAM(s) Oral every 12 hours  meropenem  IVPB 1000 milliGRAM(s) IV Intermittent every 8 hours  metoprolol tartrate 50 milliGRAM(s) Enteral Tube every 8 hours  pantoprazole  Injectable 40 milliGRAM(s) IV Push daily  polyethylene glycol 3350 17 Gram(s) Oral every 24 hours  predniSONE   Tablet 20 milliGRAM(s) Oral daily  QUEtiapine 25 milliGRAM(s) Oral <User Schedule>  senna Syrup 10 milliLiter(s) Oral every 24 hours  ursodiol Suspension 300 milliGRAM(s) Oral every 12 hours  valGANciclovir 50 mG/mL Oral Solution 450 milliGRAM(s) Oral once    MEDICATIONS  (PRN):  acetaminophen   IVPB .. 500 milliGRAM(s) IV Intermittent every 6 hours PRN Mild Pain (1 - 3)  bacitracin   Ointment 1 Application(s) Topical two times a day PRN abrasions  HYDROmorphone  Injectable 1 milliGRAM(s) IV Push every 3 hours PRN Breakthrough Pain  oxyCODONE    Solution 5 milliGRAM(s) Enteral Tube every 4 hours PRN Moderate Pain (4 - 6)  oxyCODONE    Solution 10 milliGRAM(s) Enteral Tube every 4 hours PRN Severe Pain (7 - 10)    PAST MEDICAL & SURGICAL HISTORY:  Diabetes    Transaminitis    Paroxysmal atrial fibrillation    Depression    BPH (benign prostatic hyperplasia)    Hypertension    Chronic atrial fibrillation    Coronary artery disease    Hepatocellular carcinoma    DM (diabetes mellitus)    HTN (hypertension)    Paroxysmal atrial fibrillation    Cirrhosis    HCC (hepatocellular carcinoma)    History of BPH    History of laparoscopic cholecystectomy    History of lumbar laminectomy    H/O prior ablation treatment    H/O percutaneous left heart catheterization    Vital Signs Last 24 Hrs  T(C): 36.6 (27 Nov 2024 15:00), Max: 36.9 (26 Nov 2024 23:00)  T(F): 97.8 (27 Nov 2024 15:00), Max: 98.5 (26 Nov 2024 23:00)  HR: 89 (27 Nov 2024 16:00) (80 - 130)  BP: 126/58 (27 Nov 2024 16:00) (106/53 - 198/84)  BP(mean): 84 (27 Nov 2024 16:00) (71 - 120)  RR: 25 (27 Nov 2024 16:00) (18 - 37)  SpO2: 96% (27 Nov 2024 16:00) (95% - 100%)    Parameters below as of 27 Nov 2024 15:00  Patient On (Oxygen Delivery Method): room air    I&O's Summary    26 Nov 2024 07:01  -  27 Nov 2024 07:00  --------------------------------------------------------  IN: 2734.2 mL / OUT: 2330 mL / NET: 404.2 mL    27 Nov 2024 07:01  -  27 Nov 2024 16:49  --------------------------------------------------------  IN: 1065 mL / OUT: 470 mL / NET: 595 mL                   9.6    14.96 )-----------( 189      ( 27 Nov 2024 06:34 )             32.7     11-27    148[H]  |  115[H]  |  54[H]  ----------------------------<  254[H]  4.1   |  20[L]  |  1.27    Ca    7.4[L]      27 Nov 2024 06:34  Phos  3.3     11-27  Mg     2.3     11-27    TPro  3.7[L]  /  Alb  2.1[L]  /  TBili  0.5  /  DBili  x   /  AST  23  /  ALT  33  /  AlkPhos  128[H]  11-27    Culture - Blood (collected 11-26-24 @ 06:25)  Source: .Blood BLOOD  Preliminary Report (11-27-24 @ 12:01):    No growth at 24 hours    Culture - Blood (collected 11-26-24 @ 04:50)  Source: .Blood BLOOD  Preliminary Report (11-27-24 @ 12:01):    No growth at 24 hours    Culture - Fungal, Bronchial (collected 11-24-24 @ 17:25)  Source: Bronchial  Preliminary Report (11-27-24 @ 14:03):    Few Candida glabrata    Susceptibility to follow.    Culture - Blood (collected 11-24-24 @ 11:10)  Source: .Blood BLOOD  Preliminary Report (11-27-24 @ 15:00):    No growth at 72 Hours    Culture - Blood (collected 11-24-24 @ 11:05)  Source: .Blood BLOOD  Preliminary Report (11-27-24 @ 15:00):    No growth at 72 Hours    Culture - Bronchial (collected 11-23-24 @ 13:09)  Source: Combi-Cath  Gram Stain (11-23-24 @ 23:07):    Moderate polymorphonuclear leukocytes per low power field    No squamous epithelial cells per low power field    No organisms seen  Final Report (11-25-24 @ 07:50):    Commensal charity consistent with body site    Culture - Blood (collected 11-22-24 @ 17:47)  Source: .Blood BLOOD  Preliminary Report (11-26-24 @ 23:01):    No growth at 4 days    Culture - Blood (collected 11-22-24 @ 17:47)  Source: .Blood BLOOD  Preliminary Report (11-26-24 @ 23:01):    No growth at 4 days    Review of systems  unable to obtain    PHYSICAL EXAM:  Constitutional: extubated   Eyes:  PERRLA  ENMT: nc/at, no thrush  Neck: supple   Respiratory: CTA B/L, intubated  Cardiovascular: RRR  Gastrointestinal: +Distended abdomen though improving, appropriate incisional TTP. chevron incision c/d/i, staples in place. No signs of infection.  ALYSSA#2  Genitourinary: Voiding via nichols,   Extremities: SCD's in place and working bilaterally  Neurological: A&O x0, extubated  Skin: no rashes, ulcerations, lesions

## 2024-11-27 NOTE — PROGRESS NOTE ADULT - SUBJECTIVE AND OBJECTIVE BOX
HPI:  Patient seen and examined at bedside in SICU.  Extubated.    Review Of Systems:           Respiratory: No shortness of breath, cough, or wheezing  Cardiovascular: No chest pain or palpitations  10 point review of systems is otherwise negative except as mentioned above        Medications:  acetaminophen   IVPB .. 500 milliGRAM(s) IV Intermittent every 6 hours PRN  aspirin  chewable 81 milliGRAM(s) Enteral Tube daily  bacitracin   Ointment 1 Application(s) Topical two times a day PRN  buDESOnide    Inhalation Suspension 0.5 milliGRAM(s) Inhalation two times a day  calcium carbonate 1250 mG  + Vitamin D (OsCal 500 + D) 1 Tablet(s) Oral every 12 hours  chlorhexidine 2% Cloths 1 Application(s) Topical <User Schedule>  cycloSPORINE  , modified (GENGRAF) Solution 50 milliGRAM(s) Enteral Tube <User Schedule>  digoxin  Injectable 125 MICROGram(s) IV Push daily  finasteride 5 milliGRAM(s) Oral daily  fluconAZOLE IVPB 400 milliGRAM(s) IV Intermittent every 24 hours  heparin   Injectable 5000 Unit(s) SubCutaneous every 12 hours  HYDROmorphone  Injectable 1 milliGRAM(s) IV Push every 3 hours PRN  insulin lispro (ADMELOG) corrective regimen sliding scale   SubCutaneous every 6 hours  insulin NPH human recombinant 14 Unit(s) SubCutaneous every 6 hours  levETIRAcetam  Solution 250 milliGRAM(s) Oral every 12 hours  LORazepam     Tablet 0.5 milliGRAM(s) Oral every 8 hours PRN  meropenem  IVPB 1000 milliGRAM(s) IV Intermittent every 8 hours  metoprolol tartrate 50 milliGRAM(s) Enteral Tube every 8 hours  oxyCODONE    Solution 5 milliGRAM(s) Enteral Tube every 4 hours PRN  oxyCODONE    Solution 10 milliGRAM(s) Enteral Tube every 4 hours PRN  pantoprazole  Injectable 40 milliGRAM(s) IV Push daily  polyethylene glycol 3350 17 Gram(s) Oral every 24 hours  predniSONE   Tablet 20 milliGRAM(s) Oral daily  QUEtiapine 50 milliGRAM(s) Oral at bedtime  QUEtiapine 12.5 milliGRAM(s) Oral every 8 hours PRN  senna Syrup 10 milliLiter(s) Oral every 24 hours  ursodiol Suspension 300 milliGRAM(s) Oral every 12 hours    PAST MEDICAL & SURGICAL HISTORY:  Diabetes      Transaminitis      Paroxysmal atrial fibrillation      Depression      BPH (benign prostatic hyperplasia)      Hypertension      Chronic atrial fibrillation      Coronary artery disease      Hepatocellular carcinoma      DM (diabetes mellitus)      HTN (hypertension)      Paroxysmal atrial fibrillation      Cirrhosis      HCC (hepatocellular carcinoma)      History of BPH      History of laparoscopic cholecystectomy      History of lumbar laminectomy      H/O prior ablation treatment      H/O percutaneous left heart catheterization        Vitals:  T(C): 36.6 (11-27-24 @ 19:00), Max: 36.9 (11-27-24 @ 03:00)  HR: 81 (11-27-24 @ 23:00) (79 - 130)  BP: 104/57 (11-27-24 @ 23:00) (104/56 - 198/84)  BP(mean): 77 (11-27-24 @ 23:00) (74 - 120)  RR: 22 (11-27-24 @ 23:00) (17 - 37)  SpO2: 96% (11-27-24 @ 23:00) (95% - 100%)  Wt(kg): --  Daily     Daily   I&O's Summary    26 Nov 2024 07:01 - 27 Nov 2024 07:00  --------------------------------------------------------  IN: 2734.2 mL / OUT: 2330 mL / NET: 404.2 mL    27 Nov 2024 07:01  -  27 Nov 2024 23:23  --------------------------------------------------------  IN: 1820 mL / OUT: 1080 mL / NET: 740 mL        Physical Exam:  Appearance: No acute distress; well appearing  Eyes: PERRL, EOMI, pink conjunctiva  HENT: Normal oral mucosa  Cardiovascular: RRR, S1, S2, no murmurs, rubs, or gallops; no edema; no JVD  Respiratory: Clear to auscultation bilaterally  Gastrointestinal: soft, non-tender, non-distended with normal bowel sounds  Musculoskeletal: No clubbing; no joint deformity   Neurologic: Non-focal  Lymphatic: No lymphadenopathy  Psychiatry: AAOx3, mood & affect appropriate  Skin: No rashes, ecchymoses, or cyanosis                          9.6    14.96 )-----------( 189      ( 27 Nov 2024 06:34 )             32.7     11-27    147[H]  |  113[H]  |  61[H]  ----------------------------<  258[H]  4.0   |  23  |  1.23    Ca    7.2[L]      27 Nov 2024 17:53  Phos  2.4     11-27  Mg     2.4     11-27    TPro  3.8[L]  /  Alb  2.2[L]  /  TBili  0.5  /  DBili  x   /  AST  19  /  ALT  32  /  AlkPhos  127[H]  11-27    PT/INR - ( 27 Nov 2024 06:34 )   PT: 14.0 sec;   INR: 1.23 ratio         PTT - ( 27 Nov 2024 06:34 )  PTT:26.6 sec      Cardiovascular Diagnostic Testing:  ECG: sinus rhythm    Echo: < from: Intra-Operative Transesophageal Echo W or WO Ultrasound Enhancing Agent (11.15.24 @ 07:46) >  --------------------------------------------------------------------------------  PRE-BYPASS FINDINGS     Left Ventricle:  Left ventricular ejection fraction is estimated at 60 to 65%. Normal left ventricularwall thickness. The left ventricular systolic function is normal There are no regional wall motion abnormalities seen.     Right Ventricle:  The right ventricular cavity is normal in size, with normal wall thickness and right ventricular systolic function is normal. Right ventricular systolic function is normal.     Left Atrium:  The left atrium is normal in size. No thrombus visualized in left atrial appendage.     Right Atrium:  The right atrium is normal in size. The right atrium is normal in size.     Interatrial Septum:  No PFO visualized with color flow doppler.     Aortic Valve:  The aortic valve appears trileaflet. There is no aortic valve stenosis. There is trace aortic regurgitation.     Mitral Valve:  There is no mitral valve stenosis. There is no mitral valve stenosis. There is trace mitral regurgitation.     Tricuspid Valve:  There is no evidence of tricuspid stenosis. There is trace tricuspid regurgitation.     Pericardium:  No pericardial effusion seen.     Post-Bypass:  S/P OLT. Under current loading conditions, hyperdynamic left ventricular systolic function, EF: 70-75% by visual estimate, no RWMA. Normal right ventricular systolic function. All other findings unchanged. Probe removed atraumatically, no blood. The patient tolerated the procedure well and without complications. Permanent recorded images are stored in the medical record.     Electronically signed by James Villareal on 11/15/2024 at 2:18:16 PM         *** Final ***    < end of copied text >      Stress Testing:     Cath: 4/24/2024, patient had his LHC repeated with Ben Villareal MD at [facility].  Cath showed multivessel coronary artery disease, but the lesions previously noted were FFR negative.  He continues to have MIRTA 3 flow throughout.    Interpretation of Telemetry: AFib with RVR    Imaging:

## 2024-11-27 NOTE — PROGRESS NOTE ADULT - ATTENDING COMMENTS
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 Post op course complicated by AF RVR, hypoxic respiratory failure.   S/p extubated, reintubated for worsening of resp distress    More awake, started on Seroquel for mild agitation last night, off standing  Ativan taper for possible benzo withdraw. Continue PRN Ativan for pt triggered sympt.  Keppra dose decreased to 250 mg BID sec to somnolence.   Tolerating extubation  Not on pressure, on Lopressor for a fib with RVR, On Dig/dig level  Tolerating tube feed with resolving colonic inertia, continue bowel regimen. Swallow eval  LFT down trending, normal. Hepatic US shows good flow.  Renal function improving,  On Finasteride for retention, Add free water via NGT for hypernatremia.  Abx Christopher. Off Linezolid. Leucocytosis resolving. Pt on systemic steroid taper.   ISS/NPH dose adjustment    Pharm DVT ppx SQH    Anti rej: steroid taper, cyclosporine, cyclosporine level  PPx abx: Valcyte, Fluc, Bactrim on hold    Family kept updated multiple times in detail

## 2024-11-27 NOTE — PROGRESS NOTE ADULT - ASSESSMENT
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 Post-op course c/b worsening mental status and re-intubation 11/20 for acute hypoxemic respiratory failure 2/2 aspiration.     PLAN:  Neuro:  - Sedation with low dose propofol gtt (holding precedex iso persistent fevers)  - pain control w/ Tylenol 500mg q6, oycodone 5/10 prn   - takes xanax 2mg TID at home, s/p Benzo taper  - CT head 11/19 and 11/21 negative  - CT head 11/25 - negative   - Keppra: 500mg BID -> 250 BID --> family declining   - Holding home xanax, Abilify, Zoloft, Remeron, Lexapro   - Ammonia 23     Resp:  - Intubated 11/20 2/2 acute hypoxemic respiratory failure 2/2 aspiration   - Extubated 11/24 and reintubated 11/24 d/t tachypnea   - Extubated 11/26 --> 2L NC  - CT chest 11/25: Groundglass opacities in the right lower and left upper lobes, possibly infectious in nature.  - budesonide, duonebs, hypertonic saline    CV:  - goal MAP > 65 mmHg  - lactate clear  - Metoprolol 50mg q8  - Dig 125 qd  - Dig level (11/26) - 0.9    GI:   - NPO w/ NGT - Advance TF to goal @ 55  - Bowel regimen: Miralax and Senna  - Protonix for stress ulcer prophylaxis  - ALYSSA x1, monitor output  - post-op liver doppler w/ patent vasculature     Renal:  - Monitor I&Os and electrolytes w/ repletions as necessary  - IVL   - D/c nichols in AM  - Finasteride     Heme:  - Monitor CBC and coags  - SQH BID for VTE prophylaxis  - ASA PO  - SCDs  - RUE duplex neg 11/24  - F/u LUE duplex    ID:   - Empiric ABX w/ Christopher (11/22 - ) and Linezolid (11/23 - 11/26)   - Transplant ppx fluconazole  - immunosuppression w/ steroids, tacro and Cellcept   - Blood cx 11/20 NGTD, 11/22 NGTD, 11/24 NGTD  - F/u BAL 11/24 - NGTD  - Procal 0.21 -> 0.18 --> 0.32  - F/u fungal labs     Endo:   - Monitor glucose   - NPH 6 q6  - moderate ISS  - post-transplant steroid taper     Lines:   - L radial A line (11/24 - )  - midline LUE (11/19 - )  - PIVs  - NGT  - JPs x1    Dispo: SICU

## 2024-11-27 NOTE — ADVANCED PRACTICE NURSE CONSULT - ASSESSMENT
Patient encountered on 5SIC. When wound care RN arrived on unit, patient was found lying in a low air loss pressure redistribution support surface style bed with family at bedside. Patient was alert and oriented and gave consent to skin consult. NGT in left nares. Mr Davison is unable to turn independently and staff assistance x 2 was provided. Once turned, the wound care RN was able to visualize an area of persistent nonblanchable purple hued discoloration with superficial partial thickness skin loss, total area measures approximately 6cm x 6cm x 0.1cm - presentation is consistent with a deep tissue injury in evolution. Right anterior knee abrasion with intact scab noted to measure approximately 1.5cm x 1.5cm x 0cm- wound of unknown etiology and right lateral knee abrasion with intact scab measuring approximately 1cm x 1cm x 0cm - wound of unknown etiology. Right forehead abrasion with intact scab noted to measure approximately 0.5cm x 0.5cm x 0cm- wound of unknown etiology. Left hip ecchymosis measures approximately 8cm x 10cm x 0cm and right axilla/posterior upper extremity ecchymosis measures approximately 12cm x 7cm x 0cm. Once consult was complete, patient was educated regarding the need for routine turning and positioning to prevent pressure injuries. Patient left with RN and PT at bedside to marisol patient to chair.

## 2024-11-27 NOTE — BH CONSULTATION LIAISON PROGRESS NOTE - NSBHASSESSMENTFT_PSY_ALL_CORE
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 Patient seen by transplant psychiatry for concerns of anxiety post transplant. Patient on exam noted a hx of low mood, anxiety secondary to recent health problems 3 months ago, with need for liver transplant, of which he has been followed by Dr. Hobbs outpatient for management for his mood and anxiety. He noted post operatively experiencing symptoms, anxiety, with anxious ruminations, difficulty with sleep as well as  with feelings of guilt regarding organ from decreased donor.  He denied overt symptoms of panic attacks, lara, AH/Vh/HI/SI, intent or plan. Patient educated at bedside regarding medication regiment, including recommendations for both acute anxiety alleviation as well as sleep. Patient verbalized agreement and understanding with plan proposed.     11/19:Patient on exam A/Ox2, noted to be delirious, denied any AH/VH/HI/SI, intent or plan, educated about medication regiment.    11/21:Patient on exam intubated, collateral from Son at bedside, noted patient has been using Xanax more than prescribed amount on ISTOP.     11/27:Patient on exam A/Ox2-3, noted hx of 4mg of xanax use daily for the last several months, stated he has been anxious d/t physical discomfort on exam, denied any AH/VH/HI/SI, intent or plan.    Plan  -Can consider starting Ativan 0.5mg PO TID PRN for acute anxiety alleviation, anxiety Is refractory to Seroquel use  -Combine Seroquel doses to 50mg PO HS if QTC < 500 for ICU delirium   -Can consider starting Seroquel 12.5mg PO TID PRN for acute anxiety alleviation if QTC < 500   -Please HOLD: Wellbutrin, Lexapro, Abilify, Remeron given patient altered mental status   -Transplant psychiatry will continue to follow patient

## 2024-11-28 NOTE — PROGRESS NOTE ADULT - NS ATTEND AMEND GEN_ALL_CORE FT
extubated  tired today  feeding tube - 250ml q6 free water  improving LFTs    immuno plan  cyclo per level, mmf held pred 20

## 2024-11-28 NOTE — PROGRESS NOTE ADULT - ASSESSMENT
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 Pt underwent Y90 Sept, 2023 with favorable treatment response.s/p OLT 11/15/24     # S/p OLT 11/15/24 CMV D?R? EBV , toxo D?/R?  VAlcyte, bactrim ppx  fluconazole ppx    # Fever, sepsis, hypoxic respiratory failure s/p on mechanical ventilation  Ct C/A/P Bilateral lower lobe consolidation with fluid and debris in the right lower lobe bronchi, concerning for aspiration pneumonia.  CMV PCR (11/24) 146 (low level CMV viremia - not likely contributing)  Adenovirus PCR (11/24) Negative  Cryptococcal Serum Ag (11/24) Negative  serum and bronch aspergillus galactomannan negative  WNV Serology and Serum PCR Negative  CT Chest (11/25) Groundglass opacities in the right lower and left upper lobes, possibly infectious in nature.  CT A/P (11/25) No acute process    # ileus  no diarrhea    Recommend  Continue Meropenem 1 gram q 8hr (11/23 >)  if BAl cx remain with no growth, will change jeniffer to aztreonam and reassess fevers   follow bartonella PCR  follow tick borne panel PCR  follow bronchoscopy cultures    I will continue to follow. Please feel free to contact me with any further questions.    Kyle Junior M.D.  [facility] Division of Infectious Disease  8AM-5PM Monday - Friday: Available on [employer] Teams  After Hours and Holidays (or if no response on [employer] Teams): Please contact the Infectious Diseases Office at [phone]

## 2024-11-28 NOTE — PROGRESS NOTE ADULT - ATTENDING COMMENTS
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 Post op course complicated by AF RVR, hypoxic respiratory failure.   S/p extubated, reintubated for worsening of resp distress    Tachypnea improved with pain meds, on Seroquel and prn small dose Ativan for agitation. Also on  Keppra dose decreased to 250 mg BID sec to somnolence.   Tolerating extubation, CXR no acute finding, continue pul toileting, standing bronchodilators ICS  Not on pressure, on Lopressor for a fib with RVR, On Dig/dig level  Colonic distension again, Relistor ordered, Tolerating tube feed with resolving colonic inertia, continue bowel regimen. Swallow eval  Liver function normalized, Hepatic US shows good flow.  Renal function improving,  On Finasteride for retention, increase free water via NGT for hypernatremia.  Abx Christopher. Off Linezolid. Leucocytosis resolving. Pt on systemic steroid taper.   ISS/NPH dose adjustment    Pharm DVT ppx SQH    Anti rej: steroid taper, cyclosporine, cyclosporine level  PPx abx: Valcyte, Fluc, Bactrim on hold    Family kept updated

## 2024-11-28 NOTE — PROGRESS NOTE ADULT - SUBJECTIVE AND OBJECTIVE BOX
Transplant Surgery - Multidisciplinary Progress Note   --------------------------------------------------------------  OLT   11/15/2024        POD#13    Present: Patient seen and examined with multidisciplinary Transplant team including Surgeon: Dr. James. Hepatologist: Dr. Purdy, ACP Mckayyaalejandra, pharmacist, SICU team in AM rounds. Disciplines not in attendance will be notified of the plan.     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    Interval Events:  - afebrile, vss  - d/c nichols, passed TOV  - free water flushes for hypernatremia   - fungitel >500        Immunosuppression:  -Cyclo per level, MMF HELD, pred 20  -ongoing monitoring for signs of rejection  Potential Discharge date: Pending clinical course       TX DATA HERE      Review of systems  All other systems were reviewed and are negative, except as noted.              PHYSICAL EXAM:  Constitutional: extubated   Eyes:  PERRLA  ENMT: nc/at, no thrush  Neck: supple   Respiratory: CTA B/L, intubated  Cardiovascular: RRR  Gastrointestinal: +Distended abdomen though improving, appropriate incisional TTP. chevron incision c/d/i, staples in place. No signs of infection.  ALYSSA#2  Genitourinary: Voiding via nichols,   Extremities: SCD's in place and working bilaterally  Neurological: A&O x0, extubated  Skin: no rashes, ulcerations, lesions   Transplant Surgery - Multidisciplinary Progress Note   --------------------------------------------------------------  OLT   11/15/2024        POD#13    Present: Patient seen and examined with multidisciplinary Transplant team including Surgeon: Dr. James, Dr. Sorto, JAZZ Fisher, Hepatologist: Dr. Purdy, SICU team in AM rounds. Disciplines not in attendance will be notified of the plan.     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    Interval Events:  - afebrile, vss  - on our exam, pt s/p Ativan/Seroquel  - good graft function  - hypernatremia being corrected        Immunosuppression:  -Cyclo per level, MMF HELD, pred 20  -ongoing monitoring for signs of rejection  Potential Discharge date: Pending clinical course         MEDICATIONS  (STANDING):  albuterol/ipratropium for Nebulization 3 milliLiter(s) Nebulizer every 6 hours  aspirin  chewable 81 milliGRAM(s) Enteral Tube daily  buDESOnide    Inhalation Suspension 0.5 milliGRAM(s) Inhalation two times a day  calcium carbonate 1250 mG  + Vitamin D (OsCal 500 + D) 1 Tablet(s) Oral every 12 hours  chlorhexidine 2% Cloths 1 Application(s) Topical <User Schedule>  cycloSPORINE  , modified (GENGRAF) Solution 50 milliGRAM(s) Enteral Tube <User Schedule>  digoxin  Injectable 125 MICROGram(s) IV Push daily  finasteride 5 milliGRAM(s) Oral daily  fluconAZOLE IVPB 400 milliGRAM(s) IV Intermittent every 24 hours  heparin   Injectable 5000 Unit(s) SubCutaneous every 12 hours  insulin lispro (ADMELOG) corrective regimen sliding scale   SubCutaneous every 6 hours  insulin NPH human recombinant 14 Unit(s) SubCutaneous every 6 hours  levETIRAcetam  Solution 250 milliGRAM(s) Oral every 12 hours  meropenem  IVPB 1000 milliGRAM(s) IV Intermittent every 8 hours  metoprolol tartrate 50 milliGRAM(s) Enteral Tube every 8 hours  pantoprazole  Injectable 40 milliGRAM(s) IV Push daily  polyethylene glycol 3350 17 Gram(s) Oral every 24 hours  predniSONE   Tablet 20 milliGRAM(s) Oral daily  QUEtiapine 50 milliGRAM(s) Oral at bedtime  senna Syrup 10 milliLiter(s) Oral every 24 hours  ursodiol Suspension 300 milliGRAM(s) Oral every 12 hours  valGANciclovir 50 mG/mL Oral Solution 900 milliGRAM(s) Oral daily    MEDICATIONS  (PRN):  acetaminophen   IVPB .. 500 milliGRAM(s) IV Intermittent every 6 hours PRN Mild Pain (1 - 3)  bacitracin   Ointment 1 Application(s) Topical two times a day PRN abrasions  HYDROmorphone  Injectable 1 milliGRAM(s) IV Push every 3 hours PRN Breakthrough Pain  LORazepam     Tablet 0.5 milliGRAM(s) Oral every 8 hours PRN Agitation  oxyCODONE    Solution 5 milliGRAM(s) Enteral Tube every 4 hours PRN Moderate Pain (4 - 6)  oxyCODONE    Solution 10 milliGRAM(s) Enteral Tube every 4 hours PRN Severe Pain (7 - 10)  QUEtiapine 12.5 milliGRAM(s) Oral every 8 hours PRN agitation      PAST MEDICAL & SURGICAL HISTORY:  Diabetes      Transaminitis      Paroxysmal atrial fibrillation      Depression      BPH (benign prostatic hyperplasia)      Hypertension      Chronic atrial fibrillation      Coronary artery disease      Hepatocellular carcinoma      DM (diabetes mellitus)      HTN (hypertension)      Paroxysmal atrial fibrillation      Cirrhosis      HCC (hepatocellular carcinoma)      History of BPH      History of laparoscopic cholecystectomy      History of lumbar laminectomy      H/O prior ablation treatment      H/O percutaneous left heart catheterization          Vital Signs Last 24 Hrs  T(C): 36.8 (28 Nov 2024 07:00), Max: 37.2 (28 Nov 2024 03:00)  T(F): 98.2 (28 Nov 2024 07:00), Max: 99 (28 Nov 2024 03:00)  HR: 116 (28 Nov 2024 12:00) (79 - 125)  BP: 143/76 (28 Nov 2024 12:00) (99/57 - 168/71)  BP(mean): 103 (28 Nov 2024 12:00) (73 - 120)  RR: 18 (28 Nov 2024 12:00) (17 - 34)  SpO2: 93% (28 Nov 2024 12:00) (93% - 100%)    Parameters below as of 28 Nov 2024 07:00  Patient On (Oxygen Delivery Method): room air        I&O's Summary    27 Nov 2024 07:01  -  28 Nov 2024 07:00  --------------------------------------------------------  IN: 3420 mL / OUT: 1830 mL / NET: 1590 mL    28 Nov 2024 07:01  -  28 Nov 2024 14:08  --------------------------------------------------------  IN: 745 mL / OUT: 210 mL / NET: 535 mL                              9.8    13.51 )-----------( 180      ( 28 Nov 2024 02:34 )             32.9     11-28    147[H]  |  115[H]  |  56[H]  ----------------------------<  200[H]  4.0   |  23  |  1.08    Ca    6.9[L]      28 Nov 2024 02:34  Phos  2.0     11-28  Mg     2.2     11-28    TPro  3.4[L]  /  Alb  2.0[L]  /  TBili  0.4  /  DBili  x   /  AST  18  /  ALT  28  /  AlkPhos  110  11-28          Culture - Blood (collected 11-26-24 @ 06:25)  Source: .Blood BLOOD  Preliminary Report (11-28-24 @ 11:01):    No growth at 48 Hours    Culture - Blood (collected 11-26-24 @ 04:50)  Source: .Blood BLOOD  Preliminary Report (11-28-24 @ 11:01):    No growth at 48 Hours    Culture - Fungal, Bronchial (collected 11-24-24 @ 17:25)  Source: Bronchial  Preliminary Report (11-27-24 @ 14:03):    Few Candida glabrata    Susceptibility to follow.    Culture - Blood (collected 11-24-24 @ 11:10)  Source: .Blood BLOOD  Preliminary Report (11-27-24 @ 15:00):    No growth at 72 Hours    Culture - Blood (collected 11-24-24 @ 11:05)  Source: .Blood BLOOD  Preliminary Report (11-27-24 @ 15:00):    No growth at 72 Hours    Culture - Bronchial (collected 11-23-24 @ 13:09)  Source: Combi-Cath  Gram Stain (11-23-24 @ 23:07):    Moderate polymorphonuclear leukocytes per low power field    No squamous epithelial cells per low power field    No organisms seen  Final Report (11-25-24 @ 07:50):    Commensal charity consistent with body site    Culture - Blood (collected 11-22-24 @ 17:47)  Source: .Blood BLOOD  Final Report (11-27-24 @ 23:07):    No growth at 5 days    Culture - Blood (collected 11-22-24 @ 17:47)  Source: .Blood BLOOD  Final Report (11-27-24 @ 23:07):    No growth at 5 days                  Review of systems  All other systems were reviewed and are negative, except as noted.              PHYSICAL EXAM:  Constitutional: extubated , NAD  Eyes:  PERRLA  ENMT: nc/at, no thrush  Neck: supple   Respiratory: CTA B/L  Cardiovascular: RRR  Gastrointestinal: +Distended abdomen though improving, appropriate incisional TTP. chevron incision c/d/i, staples in place. No signs of infection.  ALYSSA#2ss  Genitourinary: Voiding via nichols,   Extremities: SCD's in place and working bilaterally  Neurological: A&O x0, extubated  Skin: no rashes, ulcerations, lesions

## 2024-11-28 NOTE — PROGRESS NOTE ADULT - ASSESSMENT
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 Post-op course c/b worsening mental status and re-intubation 11/20 for acute hypoxemic respiratory failure 2/2 aspiration, extubated 11/24 and re-intubated 11/24. Pt then extubated 11/26, now on room air      PLAN:  Neuro:  - Off sedation   - pain control w/ Tylenol 500mg q6, oycodone 5/10 prn   - takes xanax 2mg TID at home, s/p Benzo taper  - CT head 11/19 and 11/21 negative  - CT head 11/25 - negative   - Keppra: 500mg BID -> 250 BID   - Seroquel 25mg q12h  - Ativan 0.5mg q8h PRN for agitation  - Holding home xanax, Abilify, Zoloft, Remeron, Lexapro   - Ammonia 23     Resp:  - Intubated 11/20 2/2 acute hypoxemic respiratory failure 2/2 aspiration   - Extubated 11/24 and reintubated 11/24 d/t tachypnea   - Extubated 11/26 --> Room air  - CT chest 11/25: Groundglass opacities in the right lower and left upper lobes, possibly infectious in nature.  - budesonide, duonebs, hypertonic saline    CV:  - goal MAP > 65 mmHg  - lactate clear  - Metoprolol 50mg q8  - Dig 125 qd  - Dig level (11/26) - 0.9    GI:   - NPO w/ NGT - Advance TF to goal @ 55  - Bowel regimen: Miralax and Senna  - Protonix for stress ulcer prophylaxis  - ALYSSA x1, monitor output  - post-op liver doppler w/ patent vasculature     Renal:  - Monitor I&Os and electrolytes w/ repletions as necessary  - HyperNa, free water 250 q6 for hypernatremia (11/27)  - Finasteride   - Castro out, passed TOV    Heme:  - Monitor CBC and coags  - SQH BID for VTE prophylaxis  - ASA PO  - SCDs  - RUE duplex neg 11/24  - LUE duplex 11/27 superficial thrombophlebitis, neg DVT - warm compress and d/c LUE midline    ID:   - Empiric ABX w/ Christopher (11/22 - )  - Transplant ppx fluconazole, valcyte (900 qd)  - Linezolid (11/23 - 11/26)   - immunosuppression w/ steroids, cyclosporine, and Cellcept   - Blood cx 11/20 NGTD, 11/22 NGTD, 11/24 NGTD  - BAL 11/24 - candida glabrata (BAL fungitell >500)   - Serum fungitell neg  - Procal 0.21 -> 0.18 --> 0.32  - repeat CMV next week per ID    Endo:   - Monitor glucose   - NPH 10u q6  - moderate ISS  - post-transplant steroid taper     Lines:   - L radial A line (11/24 - )  - midline LUE (11/19 - )  - PIVs  - NGT  - JPs x1    Dispo: SICU

## 2024-11-28 NOTE — PROGRESS NOTE ADULT - SUBJECTIVE AND OBJECTIVE BOX
Follow Up:  fever    Interval History: afebrile. more alert.     REVIEW OF SYSTEMS  [  ] ROS unobtainable because:    [ x ] All other systems negative except as noted below    Constitutional:  [ ] fever [ ] chills  [ ] weight loss  [ ] weakness  Skin:  [ ] rash [ ] phlebitis	  Eyes: [ ] icterus [ ] pain  [ ] discharge	  ENMT: [ ] sore throat  [ ] thrush [ ] ulcers [ ] exudates  Respiratory: [ ] dyspnea [ ] hemoptysis [ ] cough [ ] sputum	  Cardiovascular:  [ ] chest pain [ ] palpitations [ ] edema	  Gastrointestinal:  [ ] nausea [ ] vomiting [ ] diarrhea [ ] constipation [ ] pain	  Genitourinary:  [ ] dysuria [ ] frequency [ ] hematuria [ ] discharge [ ] flank pain  [ ] incontinence  Musculoskeletal:  [ ] myalgias [ ] arthralgias [ ] arthritis  [ ] back pain  Neurological:  [ ] headache [ ] seizures  [ ] confusion/altered mental status    Allergies  No Known Allergies        ANTIMICROBIALS:  fluconAZOLE IVPB 400 every 24 hours  meropenem  IVPB 1000 every 8 hours  valGANciclovir 50 mG/mL Oral Solution 900 daily      OTHER MEDS:  MEDICATIONS  (STANDING):  acetaminophen   IVPB .. 500 every 6 hours PRN  albuterol/ipratropium for Nebulization 3 every 6 hours  aspirin  chewable 81 daily  buDESOnide    Inhalation Suspension 0.5 two times a day  cycloSPORINE  , modified (GENGRAF) Solution 50 <User Schedule>  digoxin  Injectable 125 daily  finasteride 5 daily  heparin   Injectable 5000 every 12 hours  HYDROmorphone  Injectable 1 every 3 hours PRN  insulin lispro (ADMELOG) corrective regimen sliding scale  every 6 hours  insulin NPH human recombinant 14 every 6 hours  levETIRAcetam  Solution 250 every 12 hours  LORazepam     Tablet 0.5 every 8 hours PRN  metoprolol tartrate 50 every 8 hours  oxyCODONE    Solution 5 every 4 hours PRN  oxyCODONE    Solution 10 every 4 hours PRN  pantoprazole  Injectable 40 daily  polyethylene glycol 3350 17 every 24 hours  predniSONE   Tablet 20 daily  QUEtiapine 12.5 every 8 hours PRN  QUEtiapine 50 at bedtime  senna Syrup 10 every 24 hours  ursodiol Suspension 300 every 12 hours      Vital Signs Last 24 Hrs  T(C): 36.8 (2024 11:00), Max: 37.2 (2024 03:00)  T(F): 98.3 (2024 11:00), Max: 99 (2024 03:00)  HR: 90 (2024 14:00) (79 - 125)  BP: 113/58 (2024 14:00) (99/57 - 168/71)  BP(mean): 82 (2024 14:00) (73 - 120)  RR: 11 (2024 14:00) (11 - 34)  SpO2: 95% (2024 14:00) (93% - 100%)    Parameters below as of 2024 07:00  Patient On (Oxygen Delivery Method): room air    PHYSICAL EXAMINATION:  General: Alert and Awake, NAD  Cardiac: RRR, No M/R/G  Resp: CTAB, No Wh/Rh/Ra  Abdomen: NBS, NT/ND, No HSM, No rigidity or guarding  MSK: No LE edema. No Calf tenderness  Skin: No rashes or lesions. Skin is warm and dry to the touch.   Neuro: Alert and Awake. CN 2-12 Grossly intact. Moves all four extremities spontaneously.  Psych: Encephalopathic - unable to assess                          9.8    13.51 )-----------( 180      ( 2024 02:34 )             32.9       -    147[H]  |  115[H]  |  56[H]  ----------------------------<  200[H]  4.0   |  23  |  1.08    Ca    6.9[L]      2024 02:34  Phos  2.0       Mg     2.2         TPro  3.4[L]  /  Alb  2.0[L]  /  TBili  0.4  /  DBili  x   /  AST  18  /  ALT  28  /  AlkPhos  110        Urinalysis Basic - ( 2024 02:34 )    Color: x / Appearance: x / SG: x / pH: x  Gluc: 200 mg/dL / Ketone: x  / Bili: x / Urobili: x   Blood: x / Protein: x / Nitrite: x   Leuk Esterase: x / RBC: x / WBC x   Sq Epi: x / Non Sq Epi: x / Bacteria: x        MICROBIOLOGY:  v  .Blood BLOOD  24   No growth at 48 Hours  --  --      .Blood BLOOD  24   No growth at 48 Hours  --  --      Bronchial  24   Few Candida glabrata  Susceptibility to follow.  --  --      .Blood BLOOD  24   No growth at 72 Hours  --  --      .Blood BLOOD  24   No growth at 72 Hours  --  --      Combi-Cath  24   Commensal charity consistent with body site  --    Moderate polymorphonuclear leukocytes per low power field  No squamous epithelial cells per low power field  No organisms seen      .Blood BLOOD  24   No growth at 5 days  --  --      .Blood BLOOD  24   No growth at 5 days  --  --      .Blood BLOOD  24   No growth at 5 days  --  --      Body Fluid  11-15-24   No growth at 5 days  --    No polymorphonuclear leukocytes seen  No organisms seen  by cytocentrifuge        Toxoplasma IgG Screen: 78.00 IU/mL (11-15-24 @ 00:41)  CMV IgG Antibody: 1.50 U/mL (11-15-24 @ 00:41)    Rapid RVP Result: NotDetec ( @ 14:53)  CMVPCR Lo.16 Ulp45GB/mL ( @ 11:24)        RADIOLOGY:    <The imaging below has been reviewed and visualized by me independently. Findings as detailed in report below>    < from: Xray Chest 1 View AP/PA (24 @ 09:28) >  IMPRESSION:  Clear lungs.    < end of copied text >

## 2024-11-28 NOTE — PROGRESS NOTE ADULT - SUBJECTIVE AND OBJECTIVE BOX
HISTORY  74y Male    24 HOUR EVENTS:  - LUE swelling - Duplex: Left basilic superficial thrombophlebitis. No DVT.   - TF advancing to goal @ 55  - nichols out, passed TOV  - Seroquel 50 qhs, 12.5mg q8 prn for agitation  - Ativan 0.5mg q8 PRN for agitation  - increased NPH 14u q6h  - repeat CMV next week, valcyte 900 qd added  - HyperNa - 250cc free water q6   - BAL culture 11/24 growing candida glabrata (BAL fungitell >500)   - Serum fungitell neg      SUBJECTIVE/ROS:  [ ] A ten-point review of systems was otherwise negative except as noted.  [ ] Due to altered mental status/intubation, subjective information were not able to be obtained from the patient. History was obtained, to the extent possible, from review of the chart and collateral sources of information.      NEURO  Exam: awake, alert, oriented  Meds: acetaminophen   IVPB .. 500 milliGRAM(s) IV Intermittent every 6 hours PRN Mild Pain (1 - 3)  HYDROmorphone  Injectable 1 milliGRAM(s) IV Push every 3 hours PRN Breakthrough Pain  levETIRAcetam  Solution 250 milliGRAM(s) Oral every 12 hours  LORazepam     Tablet 0.5 milliGRAM(s) Oral every 8 hours PRN Agitation  oxyCODONE    Solution 5 milliGRAM(s) Enteral Tube every 4 hours PRN Moderate Pain (4 - 6)  oxyCODONE    Solution 10 milliGRAM(s) Enteral Tube every 4 hours PRN Severe Pain (7 - 10)  QUEtiapine 50 milliGRAM(s) Oral at bedtime  QUEtiapine 12.5 milliGRAM(s) Oral every 8 hours PRN agitation    [x] Adequacy of sedation and pain control has been assessed and adjusted      RESPIRATORY  RR: 22 (11-27-24 @ 23:00) (17 - 37)  SpO2: 96% (11-27-24 @ 23:00) (95% - 100%)  Wt(kg): --  Exam: unlabored, clear to auscultation bilaterally  Mechanical Ventilation:   ABG - ( 26 Nov 2024 11:21 )  pH: 7.54  /  pCO2: 30    /  pO2: 157   / HCO3: 26    / Base Excess: 3.5   /  SaO2: 100.0   Lactate: x          [N/A] Extubation Readiness Assessed  Meds: buDESOnide    Inhalation Suspension 0.5 milliGRAM(s) Inhalation two times a day        CARDIOVASCULAR  HR: 81 (11-27-24 @ 23:00) (79 - 130)  BP: 104/57 (11-27-24 @ 23:00) (104/56 - 198/84)  BP(mean): 77 (11-27-24 @ 23:00) (74 - 120)  ABP: --  ABP(mean): --  Wt(kg): --  CVP(cm H2O): --  VBG - ( 27 Nov 2024 02:11 )  pH: 7.47  /  pCO2: 33    /  pO2: 32    / HCO3: 24    / Base Excess: 0.7   /  SaO2: 45.7   Lactate: 2.9          Exam: regular rate and rhythm  Cardiac Rhythm: sinus  Perfusion     [x]Adequate   [ ]Inadequate  Mentation   [x]Normal       [ ]Reduced  Extremities  [x]Warm         [ ]Cool  Volume Status [ ]Hypervolemic [x]Euvolemic [ ]Hypovolemic  Meds: digoxin  Injectable 125 MICROGram(s) IV Push daily  metoprolol tartrate 50 milliGRAM(s) Enteral Tube every 8 hours        GI/NUTRITION  Exam: soft, nontender, nondistended, incision C/D/I  Diet:  Meds: pantoprazole  Injectable 40 milliGRAM(s) IV Push daily  polyethylene glycol 3350 17 Gram(s) Oral every 24 hours  senna Syrup 10 milliLiter(s) Oral every 24 hours  ursodiol Suspension 300 milliGRAM(s) Oral every 12 hours      GENITOURINARY  I&O's Detail    11-26 @ 07:01  -  11-27 @ 07:00  --------------------------------------------------------  IN:    Dexmedetomidine: 14.4 mL    Enteral Tube Flush: 240 mL    Glucerna 1.5: 630 mL    IV PiggyBack: 400 mL    IV PiggyBack: 899.8 mL    IV PiggyBack: 100 mL    multiple electrolytes Injection Type 1.: 450 mL  Total IN: 2734.2 mL    OUT:    Bulb (mL): 630 mL    Indwelling Catheter - Urethral (mL): 1700 mL    Propofol: 0 mL  Total OUT: 2330 mL    Total NET: 404.2 mL      11-27 @ 07:01  -  11-28 @ 00:27  --------------------------------------------------------  IN:    Enteral Tube Flush: 250 mL    Free Water: 500 mL    Glucerna 1.5: 770 mL    IV PiggyBack: 300 mL  Total IN: 1820 mL    OUT:    Bulb (mL): 530 mL    Voided (mL): 550 mL  Total OUT: 1080 mL    Total NET: 740 mL          11-27    147[H]  |  113[H]  |  61[H]  ----------------------------<  258[H]  4.0   |  23  |  1.23    Ca    7.2[L]      27 Nov 2024 17:53  Phos  2.4     11-27  Mg     2.4     11-27    TPro  3.8[L]  /  Alb  2.2[L]  /  TBili  0.5  /  DBili  x   /  AST  19  /  ALT  32  /  AlkPhos  127[H]  11-27    [ ] Nichols catheter, indication: N/A  Meds: calcium carbonate 1250 mG  + Vitamin D (OsCal 500 + D) 1 Tablet(s) Oral every 12 hours        HEMATOLOGIC  Meds: aspirin  chewable 81 milliGRAM(s) Enteral Tube daily  heparin   Injectable 5000 Unit(s) SubCutaneous every 12 hours    [x] VTE Prophylaxis                        9.6    14.96 )-----------( 189      ( 27 Nov 2024 06:34 )             32.7     PT/INR - ( 27 Nov 2024 06:34 )   PT: 14.0 sec;   INR: 1.23 ratio         PTT - ( 27 Nov 2024 06:34 )  PTT:26.6 sec      INFECTIOUS DISEASES  WBC Count: 14.96 K/uL (11-27 @ 06:34)  WBC Count: 19.53 K/uL (11-27 @ 02:16)    RECENT CULTURES:  Specimen Source: .Blood BLOOD  Date/Time: 11-26 @ 06:25  Culture Results:   No growth at 24 hours  Gram Stain: --  Organism: --  Specimen Source: .Blood BLOOD  Date/Time: 11-26 @ 04:50  Culture Results:   No growth at 24 hours  Gram Stain: --  Organism: --  Specimen Source: Bronchial  Date/Time: 11-24 @ 17:25  Culture Results:   Few Candida glabrata  Susceptibility to follow.[!]  Gram Stain: --  Organism: --  Specimen Source: .Blood BLOOD  Date/Time: 11-24 @ 11:10  Culture Results:   No growth at 72 Hours  Gram Stain: --  Organism: --  Specimen Source: .Blood BLOOD  Date/Time: 11-24 @ 11:05  Culture Results:   No growth at 72 Hours  Gram Stain: --  Organism: --  Specimen Source: Combi-Cath  Date/Time: 11-23 @ 13:09  Culture Results:   Commensal charity consistent with body site  Gram Stain:   Moderate polymorphonuclear leukocytes per low power field  No squamous epithelial cells per low power field  No organisms seen  Organism: --    Meds: cycloSPORINE  , modified (GENGRAF) Solution 50 milliGRAM(s) Enteral Tube <User Schedule>  fluconAZOLE IVPB 400 milliGRAM(s) IV Intermittent every 24 hours  meropenem  IVPB 1000 milliGRAM(s) IV Intermittent every 8 hours  valGANciclovir 50 mG/mL Oral Solution 900 milliGRAM(s) Oral daily        ENDOCRINE  CAPILLARY BLOOD GLUCOSE      POCT Blood Glucose.: 157 mg/dL (28 Nov 2024 00:15)  POCT Blood Glucose.: 165 mg/dL (27 Nov 2024 20:20)  POCT Blood Glucose.: 257 mg/dL (27 Nov 2024 17:12)  POCT Blood Glucose.: 300 mg/dL (27 Nov 2024 11:24)  POCT Blood Glucose.: 259 mg/dL (27 Nov 2024 06:12)  POCT Blood Glucose.: 218 mg/dL (27 Nov 2024 01:56)    Meds: finasteride 5 milliGRAM(s) Oral daily  insulin lispro (ADMELOG) corrective regimen sliding scale   SubCutaneous every 6 hours  insulin NPH human recombinant 14 Unit(s) SubCutaneous every 6 hours  predniSONE   Tablet 20 milliGRAM(s) Oral daily        CODE STATUS:

## 2024-11-28 NOTE — PROGRESS NOTE ADULT - ASSESSMENT
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 Complex collection within the gallbladder fossa, decreased in size from 11/18/2024.  - watch leukocytosis and fever curve, cultures negative, concern for aspiration PNA and possible ileus on imaging 11/21. Transplant ID following. on Meropenem/linezolid/fluc: pending infectious w/u  - 11/25 CT head neg, CT CAP - R lower, L upper lobe opacities  - watch renal function, change vaclyte to TIW and hold bactrim   - Tube feeds  - Strict I&Os   - ASA/SQH  -250 q6 of free water   - Incr valcyte 900mg    [ ] Immunosuppression  - Cyclo per level, MMF HELD, pred 20  - SIMULECT completed  - Off tacro  - PPx: Valcyte/Bactrim/PPI/OsCal/Fluc    [ ] AMS   -Reintubated 11/20 Aspiration/hypoxia, extubated 11/24->[id number]--> extubated 11/26  -Per family PT was on higher dose Xanax PO, Transplant Psych following  -EEG 11/20: Mild diffuse cerebral dysfunction is nonspecific in etiology. No epileptiform abnormalities or seizures.  -neurology following  -on keppra and ativan taper  -Seroquel 25mg bid  -Psych recs 911/27): Ativan 0.5mg PO TID PRN for acute anxiety alleviation if refractory to Seroquel, -Combine Seroquel  50mg PO HS if QTC < 500 for ICU delirium, consider Seroquel 12.5mg PO TID PRN for acute anxiety alleviation if QTC < 500      [ ] HTN/ pAFib  - BB as able  - EP following, ongoing discussions about AYDEN/Cardioversions/full A/C  - Dr. 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 Complex collection within the gallbladder fossa, decreased in size from 11/18/2024.  - watch leukocytosis and fever curve, cultures negative, concern for aspiration PNA and possible ileus on imaging 11/21. Transplant ID following. on Meropenem/linezolid/fluc, cultures clear  - 11/25 CT head neg, CT CAP - R lower, L upper lobe opacities  - Tube feeds  - Strict I&Os   - ASA/SQH  - increase free water flushes for hypernatremia    [ ] Immunosuppression  - Cyclo per level, MMF HELD, pred 20  - SIMULECT completed  - Off tacro  - PPx: Valcyte 900---treating as high risk/Mepron/PPI/OsCal/Fluc    [ ] AMS   -Reintubated 11/20 Aspiration/hypoxia, extubated 11/24->[id number]/24--> extubated 11/26  -Per family PT was on higher dose Xanax PO, Transplant Psych following  -EEG 11/20: Mild diffuse cerebral dysfunction is nonspecific in etiology. No epileptiform abnormalities or seizures.  -neurology following  -off tacro, now on cyclo  -on keppra and ativan taper  -Psych recs 911/27): Ativan 0.5mg PO TID PRN for acute anxiety alleviation if refractory to Seroquel, -Combine Seroquel  50mg PO HS if QTC < 500 for ICU delirium, consider Seroquel 12.5mg PO TID PRN for acute anxiety alleviation if QTC < 500      [ ] HTN/ pAFib  - BB as able  - EP following, ongoing discussions about AYDEN/Cardioversions/full A/C  - Dr. Quintanilla following  - watch Digoxin levels while on medication    [ ] DM  -Lantus/Lispro, adjust prn  -Endocrine as needed given steroids

## 2024-11-29 NOTE — BH CONSULTATION LIAISON PROGRESS NOTE - NSBHFUPINTERVALHXFT_PSY_A_CORE
Patient seen on exam with attending, patient on exa, A/Ox3-4, calm, noted that has experienced difficulty sleeping with symptoms of anxious ruminations and pain that has contributed to his inability to sleep well. Patient denied any Ah/Vh/Hi/SI, intent or plan, educated regarding medication recommendations, including increasing Seroquel at this time for sleep, patient amenable to increasing medication at this time.

## 2024-11-29 NOTE — PROGRESS NOTE ADULT - ASSESSMENT
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 Post-op course c/b worsening mental status and re-intubation 11/20 for acute hypoxemic respiratory failure 2/2 aspiration, extubated 11/24 and re-intubated 11/24. Pt then extubated 11/26, now on nasal cannula.      PLAN:    Neuro:  - Off sedation   - pain control w/ Tylenol 500mg q6, oycodone 5/10 prn   - takes xanax 2mg TID at home, s/p Benzo taper  - CT head 11/19 and 11/21 negative  - CT head 11/25 - negative   - Keppra: 500mg BID -> 250 BID   - Seroquel 25mg q12h  - Ativan 0.5mg q8h PRN for agitation  - Holding home xanax, Abilify, Zoloft, Remeron, Lexapro   - Ammonia 23     Resp:  - Intubated 11/20 2/2 acute hypoxemic respiratory failure 2/2 aspiration   - Extubated 11/24 and reintubated 11/24 d/t tachypnea   - Extubated 11/26   - Room air  - CT chest 11/25: Groundglass opacities in the right lower and left upper lobes, possibly infectious in nature.  - budesonide, duonebs, hypertonic saline    CV:  - goal MAP > 65 mmHg  - lactate clear  - Metoprolol 50mg q8  - Dig 125 qd  - Dig level (11/26) - 0.9    GI:   - NPO w/ NGT w/ TF @ 40   - Ileus, s/p methylnaltrexone x1 11/28   - Bowel regimen: Miralax qd and Senna qd   - Protonix for stress ulcer prophylaxis  - ALYSSA x1, monitor output  - post-op liver doppler w/ patent vasculature     Renal:  - Monitor I&Os and electrolytes w/ repletions as necessary  - HyperNa, free water 250 q6 for hypernatremia (11/27)  - Finasteride     Heme:  - Monitor CBC and coags  - SQH BID for VTE prophylaxis  - ASA PO  - SCDs  - RUE duplex neg 11/24  - LUE duplex 11/27 superficial thrombophlebitis, neg DVT    ID:   - Empiric ABX w/ Christopher (11/22 - )  - Transplant ppx fluconazole, valcyte (900 qd)  - Linezolid (11/23 - 11/26)   - immunosuppression w/ steroids, cyclosporine, and Cellcept   - Blood cx 11/20 NGTD, 11/22 NGTD, 11/24 NGTD  - BAL 11/24 - candida glabrata (BAL fungitell >500)   - Serum fungitell neg  - Procal 0.21 -> 0.18 --> 0.32  - repeat CMV next week per ID    Endo:   - Monitor glucose   - NPH 14u q6  - moderate ISS  - post-transplant steroid taper       Code Status: full     Disposition: SICU    Nadia Taveras PA-C     [id number] ASSESSMENT: 72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 Post-op course c/b worsening mental status and re-intubation 11/20 for acute hypoxemic respiratory failure 2/2 aspiration, extubated 11/24 and re-intubated 11/24. Pt then extubated 11/26, now on nasal cannula.      PLAN:    Neuro:  - Off sedation   - pain control w/ Tylenol 500mg q6, oycodone 5/10 prn   - takes xanax 2mg TID at home, s/p Benzo taper  - CT head 11/19 and 11/21 negative  - CT head 11/25 - negative   - Keppra: 500mg BID -> 250 BID   - Seroquel 25mg q12h  - Ativan 0.5mg q8h PRN for agitation  - Holding home xanax, Abilify, Zoloft, Remeron, Lexapro   - Ammonia 23   - 11/29 new right-sided facial droop, possible left upper extremity rigidity -- ordered CT head, called neurology and they will f/u on scan, nothing to do for now    Resp:  - Intubated 11/20 2/2 acute hypoxemic respiratory failure 2/2 aspiration   - Extubated 11/24 and reintubated 11/24 d/t tachypnea   - Extubated 11/26   - Room air  - CT chest 11/25: Groundglass opacities in the right lower and left upper lobes, possibly infectious in nature.  - budesonide, duonebs, hypertonic saline    CV:  - goal MAP > 65 mmHg  - lactate clear  - Metoprolol 50mg q8  - Dig 125 qd  - Dig level (11/26) - 0.9    GI:   - NPO w/ NGT w/ TF @ 40   - Ileus, s/p methylnaltrexone x1 11/28   - Bowel regimen: Miralax qd and Senna qd   - Protonix for stress ulcer prophylaxis  - ALYSSA x1, monitor output  - post-op liver doppler w/ patent vasculature     Renal:  - Monitor I&Os and electrolytes w/ repletions as necessary  - HyperNa, free water 250 q6 for hypernatremia (11/27)  - Finasteride     Heme:  - Monitor CBC and coags  - SQH BID for VTE prophylaxis  - ASA PO  - SCDs  - RUE duplex neg 11/24  - LUE duplex 11/27 superficial thrombophlebitis, neg DVT    ID:   - Empiric ABX w/ Christopher (11/22 - )  - Transplant ppx fluconazole, valcyte (900 qd)  - Linezolid (11/23 - 11/26)   - immunosuppression w/ steroids, cyclosporine, and Cellcept   - Blood cx 11/20 NGTD, 11/22 NGTD, 11/24 NGTD  - BAL 11/24 - candida glabrata (BAL fungitell >500)   - Serum fungitell neg  - Procal 0.21 -> 0.18 --> 0.32  - repeat CMV next week per ID    Endo:   - Monitor glucose   - NPH 14u q6  - moderate ISS  - post-transplant steroid taper       Code Status: full     Disposition: SICU    Nadia Taveras PA-C     [id number]

## 2024-11-29 NOTE — BH CONSULTATION LIAISON PROGRESS NOTE - NSBHASSESSMENTFT_PSY_ALL_CORE
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 Patient seen by transplant psychiatry for concerns of anxiety post transplant. Patient on exam noted a hx of low mood, anxiety secondary to recent health problems 3 months ago, with need for liver transplant, of which he has been followed by Dr. Hobbs outpatient for management for his mood and anxiety. He noted post operatively experiencing symptoms, anxiety, with anxious ruminations, difficulty with sleep as well as  with feelings of guilt regarding organ from decreased donor.  He denied overt symptoms of panic attacks, lara, AH/Vh/HI/SI, intent or plan. Patient educated at bedside regarding medication regiment, including recommendations for both acute anxiety alleviation as well as sleep. Patient verbalized agreement and understanding with plan proposed.     11/19:Patient on exam A/Ox2, noted to be delirious, denied any AH/VH/HI/SI, intent or plan, educated about medication regiment.    11/21:Patient on exam intubated, collateral from Son at bedside, noted patient has been using Xanax more than prescribed amount on ISTOP.     11/27:Patient on exam A/Ox2-3, noted hx of 4mg of xanax use daily for the last several months, stated he has been anxious d/t physical discomfort on exam, denied any AH/VH/HI/SI, intent or plan.  11/29:Patient on exam noted difficultly sleeping, will increase Seroquel at this time to address sleep, patient denied any AH/Vh/Hi/Si.      Plan  -C/W Ativan 0.5mg PO TID PRN for acute anxiety alleviation, if anxiety is refractory to Seroquel use  -Increase Seroquel to 75mg PO HS if QTC < 500 for ICU delirium   -Can consider starting Seroquel 12.5mg PO TID PRN for acute anxiety alleviation if QTC < 500   -Please HOLD: Wellbutrin, Lexapro, Abilify, Remeron given patient altered mental status   -Transplant psychiatry will continue to follow patient

## 2024-11-29 NOTE — CONSULT NOTE ADULT - ASSESSMENT
Impression:    Sacral/bilateral Buttocks deep tissue injury   Incontinence of bowel and bladder  Incontinence Dermatitis    Recommend:  1.) topical therapy: sacral/buttock wound – cleanse with incontinence cleanser, pat dry, apply Sondra ointment BID and PRN for incontinent episodes  2.) Incontinence Management - incontinence cleanser, pads, pericare BID  3.) Maintain on an alternating air with low air loss surface  4.) Turn and reposition Q 2 hours  5.) Nutrition optimization - please add Alfredo  6.) Offload heels/feet with complete cair air fluidized boots; ensure that the soles of the feet are not resting on the foot board of the bed.  7.) chair cushion for chair sitting  8.) Glycemic control    Care as per medicine. Will not actively follow but will remain available. Please recall for new issues or deterioration.  Upon discharge f/[facility] [address] [place] [address] [phone]  Thank you for this consult  VERONICA Carvalho, CWOCN via TEAMS    Nights/ Weekends/ Holidays please call:  General Surgery Consult pager ([phone]) for emergencies

## 2024-11-29 NOTE — PROGRESS NOTE ADULT - ATTENDING COMMENTS
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 Post op course complicated by AF RVR, hypoxic respiratory failure.   S/p extubated, reintubated for worsening of resp distress    Mild waxing and waning of mental status, very awake and alert today, dev mild ?lt facial droop now, stat CTH ordered. Neurology called to re eval  On nocturnal Seroquel and prn small dose Ativan for agitation. Also on  Keppra dose decreased to 250 mg BID sec to somnolence. Decrease dose of Dilaudid prn to .5 mg.   Tolerating extubation, CXR no acute finding, continue pul toileting, standing bronchodilators ICS  Not on pressure, on Lopressor for a fib with RVR, On Dig/dig level therapeutic  Responded to Relistor for colonic distension with BM, will give one dose again, tolerating tube feed, continue bowel regimen. Swallow eval  Liver function normalized, Hepatic US shows good flow.  Renal function improving, On Finasteride for retention, on free water via NGT for hypernatremia.  Abx Christopher. Off Linezolid. Leucocytosis resolving. Pt on systemic steroid taper.   ISS/NPH dose adjustment    Pharm DVT ppx SQH, Hb Pl stable    PT/mobilization    Anti rej: steroid taper, cyclosporine, cyclosporine level  PPx abx: Valcyte, Fluc, Bactrim on hold    Family kept updated

## 2024-11-29 NOTE — PROGRESS NOTE ADULT - ASSESSMENT
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 Pt underwent Y90 Sept, 2023 with favorable treatment response.s/p OLT 11/15/24     # S/p OLT 11/15/24 CMV D?R? EBV , toxo D?/R?  VAlcyte, bactrim ppx  fluconazole ppx    # Fever, sepsis, hypoxic respiratory failure s/p on mechanical ventilation  Ct C/A/P Bilateral lower lobe consolidation with fluid and debris in the right lower lobe bronchi, concerning for aspiration pneumonia.  CMV PCR (11/24) 146 (low level CMV viremia - not likely contributing)  Adenovirus PCR (11/24) Negative  Cryptococcal Serum Ag (11/24) Negative  serum and bronch aspergillus galactomannan negative  WNV Serology and Serum PCR Negative  CT Chest (11/25) Groundglass opacities in the right lower and left upper lobes, possibly infectious in nature.  CT A/P (11/25) No acute process    # ileus  no diarrhea    Recommend  Continue Meropenem 1 gram q 8hr (11/23 >). Stop after doses today.   Repeat CMV PCR on 12/2/24  if BAl cx remain with no growth, will change jeniffer to aztreonam and reassess fevers   follow bartonella PCR  follow bronchoscopy cultures  continue Valcyte 900 mg Q24H  continue fluconazole 400 mg Q24H for antifungal PPx  continue Atovaquone 1,500 mg PO Q24H for PCP PPx    I will continue to follow. Please feel free to contact me with any further questions.    Kyle Junior M.D.  [facility] Division of Infectious Disease  8AM-5PM Monday - Friday: Available on [employer] Teams  After Hours and Holidays (or if no response on [employer] Teams): Please contact the Infectious Diseases Office at [phone]    The above assessment and plan were discussed with SICU Team and Shantanu, transplant surgery PA

## 2024-11-29 NOTE — PROGRESS NOTE ADULT - SUBJECTIVE AND OBJECTIVE BOX
Follow Up:      Interval History:    REVIEW OF SYSTEMS  [  ] ROS unobtainable because:    [  ] All other systems negative except as noted below    Constitutional:  [ ] fever [ ] chills  [ ] weight loss  [ ] weakness  Skin:  [ ] rash [ ] phlebitis	  Eyes: [ ] icterus [ ] pain  [ ] discharge	  ENMT: [ ] sore throat  [ ] thrush [ ] ulcers [ ] exudates  Respiratory: [ ] dyspnea [ ] hemoptysis [ ] cough [ ] sputum	  Cardiovascular:  [ ] chest pain [ ] palpitations [ ] edema	  Gastrointestinal:  [ ] nausea [ ] vomiting [ ] diarrhea [ ] constipation [ ] pain	  Genitourinary:  [ ] dysuria [ ] frequency [ ] hematuria [ ] discharge [ ] flank pain  [ ] incontinence  Musculoskeletal:  [ ] myalgias [ ] arthralgias [ ] arthritis  [ ] back pain  Neurological:  [ ] headache [ ] seizures  [ ] confusion/altered mental status    Allergies  No Known Allergies        ANTIMICROBIALS:  atovaquone  Suspension 1500 daily  fluconAZOLE IVPB 400 every 24 hours  valGANciclovir 50 mG/mL Oral Solution 900 daily      OTHER MEDS:  MEDICATIONS  (STANDING):  acetaminophen   IVPB .. 500 every 6 hours PRN  albuterol/ipratropium for Nebulization 3 every 6 hours  aspirin  chewable 81 daily  buDESOnide    Inhalation Suspension 0.5 two times a day  cycloSPORINE  , modified (GENGRAF) Solution 50 <User Schedule>  digoxin  Injectable 125 daily  finasteride 5 daily  heparin   Injectable 5000 every 12 hours  HYDROmorphone  Injectable 0.5 every 4 hours PRN  insulin lispro (ADMELOG) corrective regimen sliding scale  every 6 hours  insulin NPH human recombinant 8 every 6 hours  levETIRAcetam   Injectable 250 every 12 hours  LORazepam   Injectable 0.5 every 8 hours PRN  metoprolol tartrate Injectable 5 every 6 hours  pantoprazole  Injectable 40 daily  polyethylene glycol 3350 17 every 24 hours  predniSONE   Tablet 20 daily  QUEtiapine 12.5 every 8 hours PRN  QUEtiapine 75 at bedtime  senna Syrup 10 every 24 hours  ursodiol Suspension 300 every 12 hours      Vital Signs Last 24 Hrs  T(C): 37.4 (2024 11:00), Max: 37.4 (2024 11:00)  T(F): 99.3 (2024 11:00), Max: 99.3 (2024 11:00)  HR: 114 (2024 14:00) (85 - 117)  BP: 116/67 (2024 14:00) (105/64 - 136/62)  BP(mean): 85 (2024 14:00) (76 - 93)  RR: 22 (2024 14:00) (12 - 31)  SpO2: 95% (2024 14:00) (93% - 100%)    Parameters below as of 2024 11:02  Patient On (Oxygen Delivery Method): room air        PHYSICAL EXAMINATION:  General: Alert and Awake, NAD  HEENT: PERRL, EOMI  Neck: Supple  Cardiac: RRR, No M/R/G  Resp: CTAB, No Wh/Rh/Ra  Abdomen: NBS, NT/ND, No HSM, No rigidity or guarding  MSK: No LE edema. No Calf tenderness  : No nichols  Skin: No rashes or lesions. Skin is warm and dry to the touch.   Neuro: Alert and Awake. CN 2-12 Grossly intact. Moves all four extremities spontaneously.  Psych: Calm, Pleasant, Cooperative                          10.8   18.62 )-----------( 225      ( 2024 06:13 )             36.1           144  |  110[H]  |  58[H]  ----------------------------<  290[H]  4.2   |  20[L]  |  1.40[H]    Ca    7.2[L]      2024 12:31  Phos  3.7       Mg     2.4         TPro  4.3[L]  /  Alb  2.4[L]  /  TBili  0.9  /  DBili  x   /  AST  48[H]  /  ALT  52[H]  /  AlkPhos  243[H]        Urinalysis Basic - ( 2024 12:31 )    Color: x / Appearance: x / SG: x / pH: x  Gluc: 290 mg/dL / Ketone: x  / Bili: x / Urobili: x   Blood: x / Protein: x / Nitrite: x   Leuk Esterase: x / RBC: x / WBC x   Sq Epi: x / Non Sq Epi: x / Bacteria: x        MICROBIOLOGY:  v  .Blood BLOOD  24   No growth at 72 Hours  --  --      .Blood BLOOD  24   No growth at 72 Hours  --  --      Bronchial  24   Few Candida glabrata  --  Candida (Torulopsis) glabrata      .Blood BLOOD  24   No growth at 5 days  --  --      .Blood BLOOD  24   No growth at 5 days  --  --      Combi-Cath  24   Commensal charity consistent with body site  --    Moderate polymorphonuclear leukocytes per low power field  No squamous epithelial cells per low power field  No organisms seen      .Blood BLOOD  24   No growth at 5 days  --  --      .Blood BLOOD  24   No growth at 5 days  --  --      .Blood BLOOD  24   No growth at 5 days  --  --      Body Fluid  11-15-24   No growth at 5 days  --    No polymorphonuclear leukocytes seen  No organisms seen  by cytocentrifuge        Toxoplasma IgG Screen: 78.00 IU/mL (11-15-24 @ 00:41)  CMV IgG Antibody: 1.50 U/mL (11-15-24 @ 00:41)    Rapid RVP Result: NotDetec ( @ 14:53)  CMVPCR Lo.16 Blx21JH/mL ( @ 11:24)        RADIOLOGY:    <The imaging below has been reviewed and visualized by me independently. Findings as detailed in report below> Follow Up:  fever    Interval History: afebrile. no acute events.     REVIEW OF SYSTEMS  [  ] ROS unobtainable because:    [ x ] All other systems negative except as noted below    Constitutional:  [ ] fever [ ] chills  [ ] weight loss  [ ] weakness  Skin:  [ ] rash [ ] phlebitis	  Eyes: [ ] icterus [ ] pain  [ ] discharge	  ENMT: [ ] sore throat  [ ] thrush [ ] ulcers [ ] exudates  Respiratory: [ ] dyspnea [ ] hemoptysis [ ] cough [ ] sputum	  Cardiovascular:  [ ] chest pain [ ] palpitations [ ] edema	  Gastrointestinal:  [ ] nausea [ ] vomiting [ ] diarrhea [ ] constipation [ ] pain	  Genitourinary:  [ ] dysuria [ ] frequency [ ] hematuria [ ] discharge [ ] flank pain  [ ] incontinence  Musculoskeletal:  [ ] myalgias [ ] arthralgias [ ] arthritis  [ ] back pain  Neurological:  [ ] headache [ ] seizures  [ ] confusion/altered mental status    Allergies  No Known Allergies        ANTIMICROBIALS:  atovaquone  Suspension 1500 daily  fluconAZOLE IVPB 400 every 24 hours  valGANciclovir 50 mG/mL Oral Solution 900 daily      OTHER MEDS:  MEDICATIONS  (STANDING):  acetaminophen   IVPB .. 500 every 6 hours PRN  albuterol/ipratropium for Nebulization 3 every 6 hours  aspirin  chewable 81 daily  buDESOnide    Inhalation Suspension 0.5 two times a day  cycloSPORINE  , modified (GENGRAF) Solution 50 <User Schedule>  digoxin  Injectable 125 daily  finasteride 5 daily  heparin   Injectable 5000 every 12 hours  HYDROmorphone  Injectable 0.5 every 4 hours PRN  insulin lispro (ADMELOG) corrective regimen sliding scale  every 6 hours  insulin NPH human recombinant 8 every 6 hours  levETIRAcetam   Injectable 250 every 12 hours  LORazepam   Injectable 0.5 every 8 hours PRN  metoprolol tartrate Injectable 5 every 6 hours  pantoprazole  Injectable 40 daily  polyethylene glycol 3350 17 every 24 hours  predniSONE   Tablet 20 daily  QUEtiapine 12.5 every 8 hours PRN  QUEtiapine 75 at bedtime  senna Syrup 10 every 24 hours  ursodiol Suspension 300 every 12 hours      Vital Signs Last 24 Hrs  T(C): 37.4 (2024 11:00), Max: 37.4 (2024 11:00)  T(F): 99.3 (2024 11:00), Max: 99.3 (2024 11:00)  HR: 114 (2024 14:00) (85 - 117)  BP: 116/67 (2024 14:00) (105/64 - 136/62)  BP(mean): 85 (2024 14:00) (76 - 93)  RR: 22 (2024 14:00) (12 - 31)  SpO2: 95% (2024 14:00) (93% - 100%)    Parameters below as of 2024 11:02  Patient On (Oxygen Delivery Method): room air      PHYSICAL EXAMINATION:  General: Alert and Awake, NAD  Cardiac: RRR, No M/R/G  Resp: CTAB, No Wh/Rh/Ra  Abdomen: NBS, NT/ND, No HSM, No rigidity or guarding  MSK: No LE edema. No Calf tenderness  Skin: No rashes or lesions. Skin is warm and dry to the touch.   Neuro: Alert and Awake. CN 2-12 Grossly intact. Moves all four extremities spontaneously.  Psych: Encephalopathic - unable to assess                          10.8   18.62 )-----------( 225      ( 2024 06:13 )             36.1           144  |  110[H]  |  58[H]  ----------------------------<  290[H]  4.2   |  20[L]  |  1.40[H]    Ca    7.2[L]      2024 12:31  Phos  3.7       Mg     2.4         TPro  4.3[L]  /  Alb  2.4[L]  /  TBili  0.9  /  DBili  x   /  AST  48[H]  /  ALT  52[H]  /  AlkPhos  243[H]        Urinalysis Basic - ( 2024 12:31 )    Color: x / Appearance: x / SG: x / pH: x  Gluc: 290 mg/dL / Ketone: x  / Bili: x / Urobili: x   Blood: x / Protein: x / Nitrite: x   Leuk Esterase: x / RBC: x / WBC x   Sq Epi: x / Non Sq Epi: x / Bacteria: x        MICROBIOLOGY:  v  .Blood BLOOD  24   No growth at 72 Hours  --  --      .Blood BLOOD  24   No growth at 72 Hours  --  --      Bronchial  24   Few Candida glabrata  --  Candida (Torulopsis) glabrata      .Blood BLOOD  24   No growth at 5 days  --  --      .Blood BLOOD  24   No growth at 5 days  --  --      Combi-Cath  24   Commensal charity consistent with body site  --    Moderate polymorphonuclear leukocytes per low power field  No squamous epithelial cells per low power field  No organisms seen      .Blood BLOOD  24   No growth at 5 days  --  --      .Blood BLOOD  24   No growth at 5 days  --  --      .Blood BLOOD  24   No growth at 5 days  --  --      Body Fluid  11-15-24   No growth at 5 days  --    No polymorphonuclear leukocytes seen  No organisms seen  by cytocentrifuge        Toxoplasma IgG Screen: 78.00 IU/mL (11-15-24 @ 00:41)  CMV IgG Antibody: 1.50 U/mL (11-15-24 @ 00:41)    Rapid RVP Result: NotDetec ( @ 14:53)  CMVPCR Lo.16 Vti12HG/mL ( @ 11:24)        RADIOLOGY:    <The imaging below has been reviewed and visualized by me independently. Findings as detailed in report below>    < from: CT Abdomen and Pelvis w/ IV Cont (24 @ 14:58) >  IMPRESSION:  Dilated small and large bowel consistent with an ileus.    No evidence of portal venous gas as questioned on liver Doppler.    Status post orthotopic liver transplant. No focal hepatic collection.   Patent hepatic vasculature.    < end of copied text >

## 2024-11-29 NOTE — PROGRESS NOTE ADULT - ASSESSMENT
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 Complex collection within the gallbladder fossa, decreased in size from 11/18/2024.  - watch leukocytosis and fever curve, cultures negative, concern for aspiration PNA and possible ileus on imaging 11/21. Transplant ID following. on Meropenem/linezolid/fluc, cultures clear  - 11/25 CT head neg, CT CAP - R lower, L upper lobe opacities  - Tube feeds  - Strict I&Os   - ASA/SQH  - increase free water flushes for hypernatremia    [ ] Immunosuppression  - Cyclo per level, MMF HELD, pred 20  - SIMULECT completed  - Off tacro  - PPx: Valcyte 900---treating as high risk/Mepron/PPI/OsCal/Fluc    [ ] AMS   -Reintubated 11/20 Aspiration/hypoxia, extubated 11/24->[id number]--> extubated 11/26  -Per family PT was on higher dose Xanax PO, Transplant Psych following  -EEG 11/20: Mild diffuse cerebral dysfunction is nonspecific in etiology. No epileptiform abnormalities or seizures.  -neurology following  -off tacro, now on cyclo  -on keppra and ativan taper  -Psych recs 911/27): Ativan 0.5mg PO TID PRN for acute anxiety alleviation if refractory to Seroquel, -Combine Seroquel  50mg PO HS if QTC < 500 for ICU delirium, consider Seroquel 12.5mg PO TID PRN for acute anxiety alleviation if QTC < 500      [ ] HTN/ pAFib  - BB as able  - EP following, ongoing discussions about AYDEN/Cardioversions/full A/C  - Dr. 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 Complex collection within the gallbladder fossa, decreased in size from 11/18/2024.  - watch leukocytosis and fever curve, cultures negative, concern for aspiration PNA and possible ileus on imaging 11/21. Transplant ID following. on Meropenem/linezolid/fluc, cultures clear  - 11/25 CT head neg, CT CAP - R lower, L upper lobe opacities  - Tube feeds, keep NGT for now  - Strict I&Os   - ASA/SQH  - increase free water flushes for hypernatremia    [ ] Immunosuppression  - Cyclo per level, MMF HELD, pred 20  - SIMULECT completed  - Off tacro  - PPx: Valcyte 900---treating as high risk/Mepron/PPI/OsCal/Fluc    [ ] AMS   -Reintubated 11/20 Aspiration/hypoxia, extubated 11/24->[id number]/24--> extubated 11/26  -Per family PT was on higher dose Xanax PO, Transplant Psych following  -EEG 11/20: Mild diffuse cerebral dysfunction is nonspecific in etiology. No epileptiform abnormalities or seizures.  -neurology following  -off tacro, now on cyclo  -on keppra and ativan taper  -Psych recs 911/27): Ativan 0.5mg PO TID PRN for acute anxiety alleviation if refractory to Seroquel, -Combine Seroquel  50mg PO HS if QTC < 500 for ICU delirium, consider Seroquel 12.5mg PO TID PRN for acute anxiety alleviation if QTC < 500    -on EEG    [ ] HTN/ pAFib  - BB as able  - EP following, ongoing discussions about AYDEN/Cardioversions/full A/C  - Dr. Quintanilla following  - watch Digoxin levels while on medication    [ ] DM  -Lantus/Lispro, adjust prn  -Endocrine as needed given steroids

## 2024-11-29 NOTE — PROGRESS NOTE ADULT - SUBJECTIVE AND OBJECTIVE BOX
Transplant Surgery - Multidisciplinary Progress Note   --------------------------------------------------------------  OLT   11/15/2024        POD#14    Present: Patient seen and examined with multidisciplinary Transplant team including Surgeon: Dr. James, Dr. Sorto, JAZZ Fisher, Hepatologist: Dr. Purdy, SICU team in AM rounds. Disciplines not in attendance will be notified of the plan.     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    Interval Events:  - afebrile, vss  - remains w/ sluggish MS  - TF down to 40s  - remains on Free water for hypernatremia  - ON FS dropped to 50s, corrected w/ 1 amp D50      Immunosuppression:  -Cyclo per level, MMF HELD, pred 20  -ongoing monitoring for signs of rejection  Potential Discharge date: Pending clinical course         MEDICATIONS  (STANDING):  albuterol/ipratropium for Nebulization 3 milliLiter(s) Nebulizer every 6 hours  aspirin  chewable 81 milliGRAM(s) Enteral Tube daily  buDESOnide    Inhalation Suspension 0.5 milliGRAM(s) Inhalation two times a day  calcium carbonate 1250 mG  + Vitamin D (OsCal 500 + D) 1 Tablet(s) Oral every 12 hours  chlorhexidine 2% Cloths 1 Application(s) Topical <User Schedule>  cycloSPORINE  , modified (GENGRAF) Solution 50 milliGRAM(s) Enteral Tube <User Schedule>  digoxin  Injectable 125 MICROGram(s) IV Push daily  finasteride 5 milliGRAM(s) Oral daily  fluconAZOLE IVPB 400 milliGRAM(s) IV Intermittent every 24 hours  heparin   Injectable 5000 Unit(s) SubCutaneous every 12 hours  insulin lispro (ADMELOG) corrective regimen sliding scale   SubCutaneous every 6 hours  insulin NPH human recombinant 14 Unit(s) SubCutaneous every 6 hours  levETIRAcetam  Solution 250 milliGRAM(s) Oral every 12 hours  meropenem  IVPB 1000 milliGRAM(s) IV Intermittent every 8 hours  metoprolol tartrate 50 milliGRAM(s) Enteral Tube every 8 hours  pantoprazole  Injectable 40 milliGRAM(s) IV Push daily  polyethylene glycol 3350 17 Gram(s) Oral every 24 hours  predniSONE   Tablet 20 milliGRAM(s) Oral daily  QUEtiapine 50 milliGRAM(s) Oral at bedtime  senna Syrup 10 milliLiter(s) Oral every 24 hours  ursodiol Suspension 300 milliGRAM(s) Oral every 12 hours  valGANciclovir 50 mG/mL Oral Solution 900 milliGRAM(s) Oral daily    MEDICATIONS  (PRN):  acetaminophen   IVPB .. 500 milliGRAM(s) IV Intermittent every 6 hours PRN Mild Pain (1 - 3)  bacitracin   Ointment 1 Application(s) Topical two times a day PRN abrasions  HYDROmorphone  Injectable 1 milliGRAM(s) IV Push every 3 hours PRN Breakthrough Pain  LORazepam     Tablet 0.5 milliGRAM(s) Oral every 8 hours PRN Agitation  oxyCODONE    Solution 5 milliGRAM(s) Enteral Tube every 4 hours PRN Moderate Pain (4 - 6)  oxyCODONE    Solution 10 milliGRAM(s) Enteral Tube every 4 hours PRN Severe Pain (7 - 10)  QUEtiapine 12.5 milliGRAM(s) Oral every 8 hours PRN agitation      PAST MEDICAL & SURGICAL HISTORY:  Diabetes      Transaminitis      Paroxysmal atrial fibrillation      Depression      BPH (benign prostatic hyperplasia)      Hypertension      Chronic atrial fibrillation      Coronary artery disease      Hepatocellular carcinoma      DM (diabetes mellitus)      HTN (hypertension)      Paroxysmal atrial fibrillation      Cirrhosis      HCC (hepatocellular carcinoma)      History of BPH      History of laparoscopic cholecystectomy      History of lumbar laminectomy      H/O prior ablation treatment      H/O percutaneous left heart catheterization          Vital Signs Last 24 Hrs  T(C): 36.8 (28 Nov 2024 07:00), Max: 37.2 (28 Nov 2024 03:00)  T(F): 98.2 (28 Nov 2024 07:00), Max: 99 (28 Nov 2024 03:00)  HR: 116 (28 Nov 2024 12:00) (79 - 125)  BP: 143/76 (28 Nov 2024 12:00) (99/57 - 168/71)  BP(mean): 103 (28 Nov 2024 12:00) (73 - 120)  RR: 18 (28 Nov 2024 12:00) (17 - 34)  SpO2: 93% (28 Nov 2024 12:00) (93% - 100%)    Parameters below as of 28 Nov 2024 07:00  Patient On (Oxygen Delivery Method): room air        I&O's Summary    27 Nov 2024 07:01  -  28 Nov 2024 07:00  --------------------------------------------------------  IN: 3420 mL / OUT: 1830 mL / NET: 1590 mL    28 Nov 2024 07:01  -  28 Nov 2024 14:08  --------------------------------------------------------  IN: 745 mL / OUT: 210 mL / NET: 535 mL                              9.8    13.51 )-----------( 180      ( 28 Nov 2024 02:34 )             32.9     11-28    147[H]  |  115[H]  |  56[H]  ----------------------------<  200[H]  4.0   |  23  |  1.08    Ca    6.9[L]      28 Nov 2024 02:34  Phos  2.0     11-28  Mg     2.2     11-28    TPro  3.4[L]  /  Alb  2.0[L]  /  TBili  0.4  /  DBili  x   /  AST  18  /  ALT  28  /  AlkPhos  110  11-28          Culture - Blood (collected 11-26-24 @ 06:25)  Source: .Blood BLOOD  Preliminary Report (11-28-24 @ 11:01):    No growth at 48 Hours    Culture - Blood (collected 11-26-24 @ 04:50)  Source: .Blood BLOOD  Preliminary Report (11-28-24 @ 11:01):    No growth at 48 Hours    Culture - Fungal, Bronchial (collected 11-24-24 @ 17:25)  Source: Bronchial  Preliminary Report (11-27-24 @ 14:03):    Few Candida glabrata    Susceptibility to follow.    Culture - Blood (collected 11-24-24 @ 11:10)  Source: .Blood BLOOD  Preliminary Report (11-27-24 @ 15:00):    No growth at 72 Hours    Culture - Blood (collected 11-24-24 @ 11:05)  Source: .Blood BLOOD  Preliminary Report (11-27-24 @ 15:00):    No growth at 72 Hours    Culture - Bronchial (collected 11-23-24 @ 13:09)  Source: Combi-Cath  Gram Stain (11-23-24 @ 23:07):    Moderate polymorphonuclear leukocytes per low power field    No squamous epithelial cells per low power field    No organisms seen  Final Report (11-25-24 @ 07:50):    Commensal charity consistent with body site    Culture - Blood (collected 11-22-24 @ 17:47)  Source: .Blood BLOOD  Final Report (11-27-24 @ 23:07):    No growth at 5 days    Culture - Blood (collected 11-22-24 @ 17:47)  Source: .Blood BLOOD  Final Report (11-27-24 @ 23:07):    No growth at 5 days                  Review of systems  All other systems were reviewed and are negative, except as noted.              PHYSICAL EXAM:  Constitutional: extubated , NAD  Eyes:  PERRLA  ENMT: nc/at, no thrush  Neck: supple   Respiratory: CTA B/L  Cardiovascular: RRR  Gastrointestinal: +Distended abdomen though improving, appropriate incisional TTP. chevron incision c/d/i, staples in place. No signs of infection.  ALYSSA#2ss  Genitourinary: Voiding via nichols,   Extremities: SCD's in place and working bilaterally  Neurological: A&O x0, extubated  Skin: no rashes, ulcerations, lesions   Transplant Surgery - Multidisciplinary Progress Note   --------------------------------------------------------------  OLT   11/15/2024        POD#14    Present: Patient seen and examined with multidisciplinary Transplant team including Surgeon: Dr. James, Dr. Sorto, JAZZ Fisher, Hepatologist: Dr. Purdy, SICU team in AM rounds. Disciplines not in attendance will be notified of the plan.     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    Interval Events:  - afebrile, vss  - remains w/ sluggish MS, EEG started as precautions ?tremors  - TF down to 40s  - remains on Free water for hypernatremia  - ON FS dropped to 50s, corrected w/ 1 amp D50      Immunosuppression:  -Cyclo per level, MMF HELD, pred 20  -ongoing monitoring for signs of rejection  Potential Discharge date: Pending clinical course         MEDICATIONS  (STANDING):  albuterol/ipratropium for Nebulization 3 milliLiter(s) Nebulizer every 6 hours  aspirin  chewable 81 milliGRAM(s) Enteral Tube daily  atovaquone  Suspension 1500 milliGRAM(s) Oral daily  buDESOnide    Inhalation Suspension 0.5 milliGRAM(s) Inhalation two times a day  calcium carbonate 1250 mG  + Vitamin D (OsCal 500 + D) 1 Tablet(s) Oral every 12 hours  chlorhexidine 2% Cloths 1 Application(s) Topical <User Schedule>  cycloSPORINE  , modified (GENGRAF) Solution 50 milliGRAM(s) Enteral Tube <User Schedule>  digoxin  Injectable 125 MICROGram(s) IV Push daily  finasteride 5 milliGRAM(s) Oral daily  fluconAZOLE IVPB 400 milliGRAM(s) IV Intermittent every 24 hours  heparin   Injectable 5000 Unit(s) SubCutaneous every 12 hours  insulin lispro (ADMELOG) corrective regimen sliding scale   SubCutaneous every 6 hours  insulin NPH human recombinant 8 Unit(s) SubCutaneous every 6 hours  levETIRAcetam  Solution 250 milliGRAM(s) Oral every 12 hours  meropenem  IVPB 1000 milliGRAM(s) IV Intermittent every 8 hours  metoprolol tartrate 50 milliGRAM(s) Enteral Tube every 8 hours  pantoprazole  Injectable 40 milliGRAM(s) IV Push daily  polyethylene glycol 3350 17 Gram(s) Oral every 24 hours  predniSONE   Tablet 20 milliGRAM(s) Oral daily  QUEtiapine 50 milliGRAM(s) Oral at bedtime  senna Syrup 10 milliLiter(s) Oral every 24 hours  ursodiol Suspension 300 milliGRAM(s) Oral every 12 hours  valGANciclovir 50 mG/mL Oral Solution 900 milliGRAM(s) Oral daily    MEDICATIONS  (PRN):  acetaminophen   IVPB .. 500 milliGRAM(s) IV Intermittent every 6 hours PRN Mild Pain (1 - 3)  bacitracin   Ointment 1 Application(s) Topical two times a day PRN abrasions  HYDROmorphone  Injectable 0.5 milliGRAM(s) IV Push every 3 hours PRN Breakthrough Pain  LORazepam     Tablet 0.5 milliGRAM(s) Oral every 8 hours PRN Agitation  oxyCODONE    Solution 5 milliGRAM(s) Enteral Tube every 4 hours PRN Moderate Pain (4 - 6)  oxyCODONE    Solution 10 milliGRAM(s) Enteral Tube every 4 hours PRN Severe Pain (7 - 10)  QUEtiapine 12.5 milliGRAM(s) Oral every 8 hours PRN agitation      PAST MEDICAL & SURGICAL HISTORY:  Diabetes      Transaminitis      Paroxysmal atrial fibrillation      Depression      BPH (benign prostatic hyperplasia)      Hypertension      Chronic atrial fibrillation      Coronary artery disease      Hepatocellular carcinoma      DM (diabetes mellitus)      HTN (hypertension)      Paroxysmal atrial fibrillation      Cirrhosis      HCC (hepatocellular carcinoma)      History of BPH      History of laparoscopic cholecystectomy      History of lumbar laminectomy      H/O prior ablation treatment      H/O percutaneous left heart catheterization          Vital Signs Last 24 Hrs  T(C): 36.8 (29 Nov 2024 07:00), Max: 37.1 (28 Nov 2024 23:00)  T(F): 98.2 (29 Nov 2024 07:00), Max: 98.8 (28 Nov 2024 23:00)  HR: 105 (29 Nov 2024 10:00) (85 - 116)  BP: 109/72 (29 Nov 2024 10:00) (105/64 - 143/76)  BP(mean): 87 (29 Nov 2024 10:00) (76 - 103)  RR: 24 (29 Nov 2024 10:00) (11 - 31)  SpO2: 96% (29 Nov 2024 10:00) (93% - 100%)    Parameters below as of 29 Nov 2024 08:00  Patient On (Oxygen Delivery Method): room air        I&O's Summary    28 Nov 2024 07:01  -  29 Nov 2024 07:00  --------------------------------------------------------  IN: 3861.5 mL / OUT: 1180 mL / NET: 2681.5 mL    29 Nov 2024 07:01  -  29 Nov 2024 11:29  --------------------------------------------------------  IN: 323.3 mL / OUT: 160 mL / NET: 163.3 mL                              10.8   18.62 )-----------( 225      ( 29 Nov 2024 06:13 )             36.1     11-29    145  |  114[H]  |  56[H]  ----------------------------<  60[L]  3.6   |  24  |  1.12    Ca    7.1[L]      29 Nov 2024 00:09  Phos  2.6     11-29  Mg     2.3     11-29    TPro  3.8[L]  /  Alb  2.3[L]  /  TBili  0.7  /  DBili  x   /  AST  47[H]  /  ALT  44  /  AlkPhos  206[H]  11-29          Culture - Blood (collected 11-26-24 @ 06:25)  Source: .Blood BLOOD  Preliminary Report (11-28-24 @ 11:01):    No growth at 48 Hours    Culture - Blood (collected 11-26-24 @ 04:50)  Source: .Blood BLOOD  Preliminary Report (11-28-24 @ 11:01):    No growth at 48 Hours    Culture - Fungal, Bronchial (collected 11-24-24 @ 17:25)  Source: Bronchial  Preliminary Report (11-27-24 @ 14:03):    Few Candida glabrata    Susceptibility to follow.    Culture - Blood (collected 11-24-24 @ 11:10)  Source: .Blood BLOOD  Preliminary Report (11-28-24 @ 15:01):    No growth at 4 days    Culture - Blood (collected 11-24-24 @ 11:05)  Source: .Blood BLOOD  Preliminary Report (11-28-24 @ 15:01):    No growth at 4 days    Culture - Bronchial (collected 11-23-24 @ 13:09)  Source: Combi-Cath  Gram Stain (11-23-24 @ 23:07):    Moderate polymorphonuclear leukocytes per low power field    No squamous epithelial cells per low power field    No organisms seen  Final Report (11-25-24 @ 07:50):    Commensal charity consistent with body site    Culture - Blood (collected 11-22-24 @ 17:47)  Source: .Blood BLOOD  Final Report (11-27-24 @ 23:07):    No growth at 5 days    Culture - Blood (collected 11-22-24 @ 17:47)  Source: .Blood BLOOD  Final Report (11-27-24 @ 23:07):    No growth at 5 days                    Review of systems  All other systems were reviewed and are negative, except as noted.              PHYSICAL EXAM:  Constitutional: extubated , NAD  Eyes:  PERRLA  ENMT: nc/at, no thrush, NGT  Neck: supple   Respiratory: CTA B/L  Cardiovascular: RRR  Gastrointestinal: +Distended abdomen though improving, appropriate incisional TTP. chevron incision c/d/i, staples in place. No signs of infection.  ALYSSA#2ss  Genitourinary: Voiding via nichols,   Extremities: SCD's in place and working bilaterally  Neurological: A&O x1, extubated  Skin: no rashes, ulcerations, lesions

## 2024-11-29 NOTE — CONSULT NOTE ADULT - SUBJECTIVE AND OBJECTIVE BOX
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    Request for wound care evaluation of the sacrum and bilateral buttocks received from nursing. Mr. Davison was encountered on an air fluidization simulation surface. He was seen with his clinical nurse.  He is incontinent of stool and urine. His immobility, inactivity, incontinence of bowel and bladder in addition to poor nutritional status all contribute to his risk of pressure injury development and hinder healing.      PAST MEDICAL & SURGICAL HISTORY:  Diabetes  Transaminitis  Paroxysmal atrial fibrillation  Depression  BPH (benign prostatic hyperplasia)  Hypertension  Chronic atrial fibrillation  Coronary artery disease  Hepatocellular carcinoma  DM (diabetes mellitus)  HTN (hypertension)  Paroxysmal atrial fibrillation  Cirrhosis  HCC (hepatocellular carcinoma)  History of BPH  History of laparoscopic cholecystectomy  History of lumbar laminectomy  H/O prior ablation treatment  H/O percutaneous left heart catheterization    REVIEW OF SYSTEMS  CONSTITUTIONAL: no weakness, no fevers or chills  EYES/ENT: No visual changes;  No vertigo or throat pain   MOUTH: No oral lesion, moist  NECK: No pain or stiffness  RESPIRATORY: No cough, wheezing, hemoptysis; No shortness of breath  CARDIOVASCULAR: No chest pain or palpitations  GASTROINTESTINAL: No abdominal or epigastric pain. No nausea, vomiting, or hematemesis  GENITOURINARY: No dysuria, frequency or hematuria  NEUROLOGICAL: + lethargic  SKIN: patches of skin tears and healing unroofed blisters  PSYCH: +anxiety    MEDICATIONS  (STANDING):  albuterol/ipratropium for Nebulization 3 milliLiter(s) Nebulizer every 6 hours  aspirin  chewable 81 milliGRAM(s) Enteral Tube daily  atovaquone  Suspension 1500 milliGRAM(s) Oral daily  buDESOnide    Inhalation Suspension 0.5 milliGRAM(s) Inhalation two times a day  calcium carbonate 1250 mG  + Vitamin D (OsCal 500 + D) 1 Tablet(s) Oral every 12 hours  chlorhexidine 2% Cloths 1 Application(s) Topical <User Schedule>  cycloSPORINE  , modified (GENGRAF) Solution 50 milliGRAM(s) Enteral Tube <User Schedule>  digoxin  Injectable 125 MICROGram(s) IV Push daily  finasteride 5 milliGRAM(s) Oral daily  fluconAZOLE IVPB 400 milliGRAM(s) IV Intermittent every 24 hours  heparin   Injectable 5000 Unit(s) SubCutaneous every 12 hours  insulin lispro (ADMELOG) corrective regimen sliding scale   SubCutaneous every 6 hours  insulin NPH human recombinant 8 Unit(s) SubCutaneous every 6 hours  levETIRAcetam   Injectable 250 milliGRAM(s) IV Push every 12 hours  metoprolol tartrate Injectable 5 milliGRAM(s) IV Push every 6 hours  pantoprazole  Injectable 40 milliGRAM(s) IV Push daily  polyethylene glycol 3350 17 Gram(s) Oral every 24 hours  predniSONE   Tablet 20 milliGRAM(s) Oral daily  QUEtiapine 75 milliGRAM(s) Oral at bedtime  senna Syrup 10 milliLiter(s) Oral every 24 hours  ursodiol Suspension 300 milliGRAM(s) Oral every 12 hours  valGANciclovir 50 mG/mL Oral Solution 900 milliGRAM(s) Oral daily    MEDICATIONS  (PRN):  acetaminophen   IVPB .. 500 milliGRAM(s) IV Intermittent every 6 hours PRN Mild Pain (1 - 3)  bacitracin   Ointment 1 Application(s) Topical two times a day PRN abrasions  HYDROmorphone  Injectable 0.5 milliGRAM(s) IV Push every 4 hours PRN Breakthrough Pain  LORazepam   Injectable 0.5 milliGRAM(s) IV Push every 8 hours PRN Agitation  QUEtiapine 12.5 milliGRAM(s) Oral every 8 hours PRN agitation    Allergies    No Known Allergies    Intolerances    SOCIAL HISTORY:  retired urologist    FAMILY HISTORY:  Family history of coronary artery disease (Father, Sibling, Sibling)    Family history of diabetes mellitus (Father, Mother)    Family history of coronary artery disease (Sibling)    Vital Signs Last 24 Hrs  T(C): 36.8 (29 Nov 2024 15:00), Max: 37.4 (29 Nov 2024 11:00)  T(F): 98.2 (29 Nov 2024 15:00), Max: 99.3 (29 Nov 2024 11:00)  HR: 107 (29 Nov 2024 16:00) (85 - 117)  BP: 112/61 (29 Nov 2024 16:00) (105/64 - 136/62)  BP(mean): 81 (29 Nov 2024 16:00) (76 - 93)  RR: 22 (29 Nov 2024 16:00) (12 - 31)  SpO2: 97% (29 Nov 2024 16:00) (95% - 100%)    Parameters below as of 29 Nov 2024 15:00  Patient On (Oxygen Delivery Method): room air    Physical Exam:  General: lethargic  Ophthamology: sclera clear  ENMT: moist mucous membranes, trachea midline  Respiratory: equal chest rise with respirations  Gastrointestinal: soft NT/ND  Neurology: nonverbal, not following commands  Psych: calm, cooperative  Musculoskeletal: no contractures  Vascular: BLE edema equal  Skin:  Sacrum/bilateral buttocks deep maroon discolored skin with intact blister,  L 7cm x 7cm x none, no drainage   No odor, increased warmth, tenderness, induration, fluctuance, erythema      LABS:  11-29    144  |  110[H]  |  58[H]  ----------------------------<  290[H]  4.2   |  20[L]  |  1.40[H]    Ca    7.2[L]      29 Nov 2024 12:31  Phos  3.7     11-29  Mg     2.4     11-29    TPro  4.3[L]  /  Alb  2.4[L]  /  TBili  0.9  /  DBili  x   /  AST  48[H]  /  ALT  52[H]  /  AlkPhos  243[H]  11-29                          10.8   18.62 )-----------( 225      ( 29 Nov 2024 06:13 )             36.1     PT/INR - ( 29 Nov 2024 06:13 )   PT: 13.3 sec;   INR: 1.17 ratio         PTT - ( 29 Nov 2024 06:13 )  PTT:25.1 sec  Urinalysis Basic - ( 29 Nov 2024 12:31 )    Color: x / Appearance: x / SG: x / pH: x  Gluc: 290 mg/dL / Ketone: x  / Bili: x / Urobili: x   Blood: x / Protein: x / Nitrite: x   Leuk Esterase: x / RBC: x / WBC x   Sq Epi: x / Non Sq Epi: x / Bacteria: x

## 2024-11-29 NOTE — CHART NOTE - NSCHARTNOTEFT_GEN_A_CORE
NUTRITION FOLLOW UP NOTE    PATIENT SEEN FOR: malnutrition follow up     SOURCE: [] Patient  [x] Current Medical Record  [x] RN  [] Family/support person at bedside  [] Patient unavailable/inappropriate  [x] Other: Team     CHART REVIEWED/EVENTS NOTED.  [x] Nutrition Status:  -S/p OLT 11/15  -re-Extubated 11/26   -Reported  ileus; Relistor ordered  -Thought to have improved bowel function; now concern for ischemic bowel     DIET ORDER:   Diet, NPO (11-29-24)      NUTRITION INTAKE/PROVISION:  [x] Enteral Nutrition:  -Team continuously advanced than lowered Glucerna 1.5 goal rate since 11/24 in setting of varying bowel activity       ANTHROPOMETRICS:  Drug Dosing Weight  Height (cm): 182.9 (15 Nov 2024 05:30)  Weight (kg): 93.6 (15 Nov 2024 05:30)  BMI (kg/m2): 28 (15 Nov 2024 05:30)    NUTRITIONALLY PERTINENT MEDICATIONS:  MEDICATIONS  (STANDING):  atovaquone  Suspension  calcium carbonate 1250 mG  + Vitamin D (OsCal 500 + D)  digoxin  Injectable  finasteride  fluconAZOLE IVPB  insulin lispro (ADMELOG) corrective regimen sliding scale  insulin NPH human recombinant  meropenem  IVPB  methylnaltrexone Injectable  metoprolol tartrate  pantoprazole  Injectable  polyethylene glycol 3350  predniSONE   Tablet  senna Syrup  sodium chloride 0.9% Bolus  ursodiol Suspension  valGANciclovir 50 mG/mL Oral Solution       NUTRITIONALLY PERTINENT LABS:  11-29 Na144 mmol/L Glu 290 mg/dL[H] K+ 4.2 mmol/L Cr  1.40 mg/dL[H] BUN 58 mg/dL[H] 11-29 Phos 3.7 mg/dL 11-29 Alb 2.4 g/dL[L]11-29 ALT 52 U/L[H] AST 48 U/L[H] Alkaline Phosphatase 243 U/L[H]      Finger Sticks:  POCT Blood Glucose.: 276 mg/dL (11-29 @ 12:16)  POCT Blood Glucose.: 137 mg/dL (11-29 @ 05:52)  POCT Blood Glucose.: 116 mg/dL (11-29 @ 03:34)  POCT Blood Glucose.: 194 mg/dL (11-29 @ 00:14)  POCT Blood Glucose.: 57 mg/dL (11-28 @ 23:57)  POCT Blood Glucose.: 54 mg/dL (11-28 @ 23:56)  POCT Blood Glucose.: 132 mg/dL (11-28 @ 17:47)      NUTRITIONALLY PERTINENT MEDICATIONS/LABS:  [x] Reviewed  [x] Relevant notes on medications/labs:  -Cyclosporine   -Prednisone   -Sliding scale insulin, 8 units Humulin  -Relistor     EDEMA:  [x] Reviewed  [] Relevant notes:    GI/ I&O:  [x] Reviewed  -BM documented 11/29; prior to then 11/26     SKIN:   [x] Change in pressure injury documentation:  -B/L buttocks/sacral: suspected deep tissue injury     ESTIMATED NEEDS:  [x] No change:  Energy:   2178-2582kcal/day (27-32 kcal/kg)  Protein:   97-121g/day (1.2-1.5 g/kg)  Fluid:   ml/day or [x] defer to team  Based on: 80.7kg     NUTRITION DIAGNOSIS:  [x] Prior Dx:  1. Increased protein-energy needs   2. Severe malnutrition (Acute)  [] New Dx:    EDUCATION:  [] Yes:  [x] Not appropriate/warranted    NUTRITION CARE PLAN:  1. Diet: defer to Team pending GI function   -If EN warranted: Magdalene Gonzalez Peptide 1/5 @ 60mL x 24hrs providing 1440mL of formula, 2215kcal/day (27kcal/kg), 107g protein/day (1.3g/kg), 1008mL free water - based on 80.7kg   (Semi-elemental formula in setting of GI distress, low CHO for steroid induced hyperglycemia)   2. Supplements: N/A  3. Multivitamin/mineral supplementation: defer to Team    MONITORING AND EVALUATION:   RD remains available upon request and will follow up per protocol.    Malorie Pike, MS, RDN, CDN (Teams)   Available on [facility] TEAMS

## 2024-11-29 NOTE — PROGRESS NOTE ADULT - SUBJECTIVE AND OBJECTIVE BOX
24 HOUR EVENTS:  - Relistor x1   - Duonebs q6h  - TF down to 40  - F/u abdominal xray and chest xray tomorrow AM  - Free nieves 250 q4h for hyperNa  - FS dropped to 54 --> s/p 1amp D50       NEURO  Exam: A&O x 3  Meds: acetaminophen   IVPB .. 500 milliGRAM(s) IV Intermittent every 6 hours PRN Mild Pain (1 - 3)  HYDROmorphone  Injectable 1 milliGRAM(s) IV Push every 3 hours PRN Breakthrough Pain  levETIRAcetam  Solution 250 milliGRAM(s) Oral every 12 hours  LORazepam     Tablet 0.5 milliGRAM(s) Oral every 8 hours PRN Agitation  oxyCODONE    Solution 5 milliGRAM(s) Enteral Tube every 4 hours PRN Moderate Pain (4 - 6)  oxyCODONE    Solution 10 milliGRAM(s) Enteral Tube every 4 hours PRN Severe Pain (7 - 10)  QUEtiapine 12.5 milliGRAM(s) Oral every 8 hours PRN agitation  QUEtiapine 50 milliGRAM(s) Oral at bedtime      RESPIRATORY  RR: 16 (11-29-24 @ 00:00) (11 - 27)  SpO2: 95% (11-29-24 @ 00:00) (93% - 100%)  Exam: unlabored    Meds: albuterol/ipratropium for Nebulization 3 milliLiter(s) Nebulizer every 6 hours  buDESOnide    Inhalation Suspension 0.5 milliGRAM(s) Inhalation two times a day      CARDIOVASCULAR  HR: 98 (11-29-24 @ 00:00) (85 - 125)  BP: 115/58 (11-29-24 @ 00:00) (108/53 - 167/77)  BP(mean): 82 (11-29-24 @ 00:00) (76 - 120)  VBG - ( 27 Nov 2024 02:11 )  pH: 7.47  /  pCO2: 33    /  pO2: 32    / HCO3: 24    / Base Excess: 0.7   /  SaO2: 45.7   Lactate: 2.9          Exam:   Cardiac Rhythm:   Perfusion     [ x]Adequate   [ ]Inadequate  Mentation   [x ]Normal       [ ]Reduced  Extremities  [ x]Warm         [ ]Cool  Volume Status [ ]Hypervolemic [x ]Euvolemic [ ]Hypovolemic  Meds: digoxin  Injectable 125 MICROGram(s) IV Push daily  metoprolol tartrate 50 milliGRAM(s) Enteral Tube every 8 hours      GI/NUTRITION  Exam: soft, NT/ND  Diet: tube feeds  Meds: pantoprazole  Injectable 40 milliGRAM(s) IV Push daily  polyethylene glycol 3350 17 Gram(s) Oral every 24 hours  senna Syrup 10 milliLiter(s) Oral every 24 hours  ursodiol Suspension 300 milliGRAM(s) Oral every 12 hours      GENITOURINARY  I&O's Detail    11-27 @ 07:01 - 11-28 @ 07:00  --------------------------------------------------------  IN:    Enteral Tube Flush: 400 mL    Free Water: 1000 mL    Glucerna 1.5: 1320 mL    IV PiggyBack: 250 mL    IV PiggyBack: 450 mL  Total IN: 3420 mL    OUT:    Bulb (mL): 930 mL    Intermittent Catheterization - Urethral (mL): 350 mL    Voided (mL): 550 mL  Total OUT: 1830 mL    Total NET: 1590 mL      11-28 @ 07:01 - 11-29 @ 00:55  --------------------------------------------------------  IN:    Enteral Tube Flush: 390 mL    Free Water: 1000 mL    Glucerna 1.5: 685 mL    IV PiggyBack: 300 mL  Total IN: 2375 mL    OUT:    Bulb (mL): 510 mL    Voided (mL): 300 mL  Total OUT: 810 mL    Total NET: 1565 mL          11-29    145  |  114[H]  |  56[H]  ----------------------------<  60[L]  3.6   |  24  |  1.12    Ca    7.1[L]      29 Nov 2024 00:09  Phos  2.6     11-29  Mg     2.3     11-29    TPro  3.8[L]  /  Alb  2.3[L]  /  TBili  0.7  /  DBili  x   /  AST  47[H]  /  ALT  44  /  AlkPhos  206[H]  11-29    Meds: calcium carbonate 1250 mG  + Vitamin D (OsCal 500 + D) 1 Tablet(s) Oral every 12 hours      HEMATOLOGIC  Meds: aspirin  chewable 81 milliGRAM(s) Enteral Tube daily  heparin   Injectable 5000 Unit(s) SubCutaneous every 12 hours                          9.8    13.51 )-----------( 180      ( 28 Nov 2024 02:34 )             32.9     PT/INR - ( 28 Nov 2024 02:34 )   PT: 13.0 sec;   INR: 1.13 ratio         PTT - ( 28 Nov 2024 02:34 )  PTT:27.7 sec    INFECTIOUS DISEASES  T(C): 37.1 (11-28-24 @ 23:00), Max: 37.2 (11-28-24 @ 03:00)  Wt(kg): --  WBC Count: 13.51 K/uL (11-28 @ 02:34)    Recent Cultures:  Specimen Source: .Blood BLOOD, 11-26 @ 06:25; Results   No growth at 48 Hours; Gram Stain: --; Organism: --  Specimen Source: .Blood BLOOD, 11-26 @ 04:50; Results   No growth at 48 Hours; Gram Stain: --; Organism: --  Specimen Source: Bronchial, 11-24 @ 17:25; Results   Few Candida glabrata  Susceptibility to follow.[!]; Gram Stain: --; Organism: --  Specimen Source: .Blood BLOOD, 11-24 @ 11:10; Results   No growth at 4 days; Gram Stain: --; Organism: --  Specimen Source: .Blood BLOOD, 11-24 @ 11:05; Results   No growth at 4 days; Gram Stain: --; Organism: --  Specimen Source: Combi-Cath, 11-23 @ 13:09; Results   Commensal charity consistent with body site; Gram Stain:   Moderate polymorphonuclear leukocytes per low power field  No squamous epithelial cells per low power field  No organisms seen; Organism: --  Specimen Source: .Blood BLOOD, 11-22 @ 17:47; Results   No growth at 5 days; Gram Stain: --; Organism: --    Meds: atovaquone  Suspension 1500 milliGRAM(s) Oral daily  cycloSPORINE  , modified (GENGRAF) Solution 50 milliGRAM(s) Enteral Tube <User Schedule>  fluconAZOLE IVPB 400 milliGRAM(s) IV Intermittent every 24 hours  meropenem  IVPB 1000 milliGRAM(s) IV Intermittent every 8 hours  valGANciclovir 50 mG/mL Oral Solution 900 milliGRAM(s) Oral daily      ENDOCRINE  Capillary Blood Glucose    Meds: finasteride 5 milliGRAM(s) Oral daily  insulin lispro (ADMELOG) corrective regimen sliding scale   SubCutaneous every 6 hours  insulin NPH human recombinant 14 Unit(s) SubCutaneous every 6 hours  predniSONE   Tablet 20 milliGRAM(s) Oral daily      ACCESS DEVICES:  [ ] Peripheral IV  [ ] Central Venous Line		[ ] R	[ ] L	[ ] IJ	[ ] Fem	[ ] SC	Placed:   [ ] Arterial Line			[ ] R	[ ] L	[ ] Fem	[ ] Rad	[ ] Ax	Placed:   [ ] PICC:					[ ] Mediport  [ ] Urinary Catheter, Date Placed:   [ ] Necessity of urinary, arterial, and venous catheters discussed    OTHER MEDICATIONS:  bacitracin   Ointment 1 Application(s) Topical two times a day PRN  chlorhexidine 2% Cloths 1 Application(s) Topical <User Schedule>      IMAGING: 24 HOUR EVENTS:  - Relistor x1   - Duonebs q6h  - TF down to 40  - F/u abdominal xray and chest xray tomorrow AM  - Free nieves 250 q4h for hyperNa  - FS dropped to 54 --> s/p 1amp D50   - Patient with new right-sided facial droop, possible left upper extremity rigidity -- ordered urgent CT head, called neurology and they will f/u on scan, nothing to do for now      NEURO  Exam: A&O x 3  Meds: acetaminophen   IVPB .. 500 milliGRAM(s) IV Intermittent every 6 hours PRN Mild Pain (1 - 3)  HYDROmorphone  Injectable 1 milliGRAM(s) IV Push every 3 hours PRN Breakthrough Pain  levETIRAcetam  Solution 250 milliGRAM(s) Oral every 12 hours  LORazepam     Tablet 0.5 milliGRAM(s) Oral every 8 hours PRN Agitation  oxyCODONE    Solution 5 milliGRAM(s) Enteral Tube every 4 hours PRN Moderate Pain (4 - 6)  oxyCODONE    Solution 10 milliGRAM(s) Enteral Tube every 4 hours PRN Severe Pain (7 - 10)  QUEtiapine 12.5 milliGRAM(s) Oral every 8 hours PRN agitation  QUEtiapine 50 milliGRAM(s) Oral at bedtime      RESPIRATORY  RR: 16 (11-29-24 @ 00:00) (11 - 27)  SpO2: 95% (11-29-24 @ 00:00) (93% - 100%)  Exam: unlabored    Meds: albuterol/ipratropium for Nebulization 3 milliLiter(s) Nebulizer every 6 hours  buDESOnide    Inhalation Suspension 0.5 milliGRAM(s) Inhalation two times a day      CARDIOVASCULAR  HR: 98 (11-29-24 @ 00:00) (85 - 125)  BP: 115/58 (11-29-24 @ 00:00) (108/53 - 167/77)  BP(mean): 82 (11-29-24 @ 00:00) (76 - 120)  VBG - ( 27 Nov 2024 02:11 )  pH: 7.47  /  pCO2: 33    /  pO2: 32    / HCO3: 24    / Base Excess: 0.7   /  SaO2: 45.7   Lactate: 2.9          Exam:   Cardiac Rhythm:   Perfusion     [ x]Adequate   [ ]Inadequate  Mentation   [x ]Normal       [ ]Reduced  Extremities  [ x]Warm         [ ]Cool  Volume Status [ ]Hypervolemic [x ]Euvolemic [ ]Hypovolemic  Meds: digoxin  Injectable 125 MICROGram(s) IV Push daily  metoprolol tartrate 50 milliGRAM(s) Enteral Tube every 8 hours      GI/NUTRITION  Exam: soft, NT/ND  Diet: tube feeds  Meds: pantoprazole  Injectable 40 milliGRAM(s) IV Push daily  polyethylene glycol 3350 17 Gram(s) Oral every 24 hours  senna Syrup 10 milliLiter(s) Oral every 24 hours  ursodiol Suspension 300 milliGRAM(s) Oral every 12 hours      GENITOURINARY  I&O's Detail    11-27 @ 07:01  -  11-28 @ 07:00  --------------------------------------------------------  IN:    Enteral Tube Flush: 400 mL    Free Water: 1000 mL    Glucerna 1.5: 1320 mL    IV PiggyBack: 250 mL    IV PiggyBack: 450 mL  Total IN: 3420 mL    OUT:    Bulb (mL): 930 mL    Intermittent Catheterization - Urethral (mL): 350 mL    Voided (mL): 550 mL  Total OUT: 1830 mL    Total NET: 1590 mL      11-28 @ 07:01 - 11-29 @ 00:55  --------------------------------------------------------  IN:    Enteral Tube Flush: 390 mL    Free Water: 1000 mL    Glucerna 1.5: 685 mL    IV PiggyBack: 300 mL  Total IN: 2375 mL    OUT:    Bulb (mL): 510 mL    Voided (mL): 300 mL  Total OUT: 810 mL    Total NET: 1565 mL          11-29    145  |  114[H]  |  56[H]  ----------------------------<  60[L]  3.6   |  24  |  1.12    Ca    7.1[L]      29 Nov 2024 00:09  Phos  2.6     11-29  Mg     2.3     11-29    TPro  3.8[L]  /  Alb  2.3[L]  /  TBili  0.7  /  DBili  x   /  AST  47[H]  /  ALT  44  /  AlkPhos  206[H]  11-29    Meds: calcium carbonate 1250 mG  + Vitamin D (OsCal 500 + D) 1 Tablet(s) Oral every 12 hours      HEMATOLOGIC  Meds: aspirin  chewable 81 milliGRAM(s) Enteral Tube daily  heparin   Injectable 5000 Unit(s) SubCutaneous every 12 hours                          9.8    13.51 )-----------( 180      ( 28 Nov 2024 02:34 )             32.9     PT/INR - ( 28 Nov 2024 02:34 )   PT: 13.0 sec;   INR: 1.13 ratio         PTT - ( 28 Nov 2024 02:34 )  PTT:27.7 sec    INFECTIOUS DISEASES  T(C): 37.1 (11-28-24 @ 23:00), Max: 37.2 (11-28-24 @ 03:00)  Wt(kg): --  WBC Count: 13.51 K/uL (11-28 @ 02:34)    Recent Cultures:  Specimen Source: .Blood BLOOD, 11-26 @ 06:25; Results   No growth at 48 Hours; Gram Stain: --; Organism: --  Specimen Source: .Blood BLOOD, 11-26 @ 04:50; Results   No growth at 48 Hours; Gram Stain: --; Organism: --  Specimen Source: Bronchial, 11-24 @ 17:25; Results   Few Candida glabrata  Susceptibility to follow.[!]; Gram Stain: --; Organism: --  Specimen Source: .Blood BLOOD, 11-24 @ 11:10; Results   No growth at 4 days; Gram Stain: --; Organism: --  Specimen Source: .Blood BLOOD, 11-24 @ 11:05; Results   No growth at 4 days; Gram Stain: --; Organism: --  Specimen Source: Combi-Cath, 11-23 @ 13:09; Results   Commensal charity consistent with body site; Gram Stain:   Moderate polymorphonuclear leukocytes per low power field  No squamous epithelial cells per low power field  No organisms seen; Organism: --  Specimen Source: .Blood BLOOD, 11-22 @ 17:47; Results   No growth at 5 days; Gram Stain: --; Organism: --    Meds: atovaquone  Suspension 1500 milliGRAM(s) Oral daily  cycloSPORINE  , modified (GENGRAF) Solution 50 milliGRAM(s) Enteral Tube <User Schedule>  fluconAZOLE IVPB 400 milliGRAM(s) IV Intermittent every 24 hours  meropenem  IVPB 1000 milliGRAM(s) IV Intermittent every 8 hours  valGANciclovir 50 mG/mL Oral Solution 900 milliGRAM(s) Oral daily      ENDOCRINE  Capillary Blood Glucose    Meds: finasteride 5 milliGRAM(s) Oral daily  insulin lispro (ADMELOG) corrective regimen sliding scale   SubCutaneous every 6 hours  insulin NPH human recombinant 14 Unit(s) SubCutaneous every 6 hours  predniSONE   Tablet 20 milliGRAM(s) Oral daily      ACCESS DEVICES:  [ ] Peripheral IV  [ ] Central Venous Line		[ ] R	[ ] L	[ ] IJ	[ ] Fem	[ ] SC	Placed:   [ ] Arterial Line			[ ] R	[ ] L	[ ] Fem	[ ] Rad	[ ] Ax	Placed:   [ ] PICC:					[ ] Mediport  [ ] Urinary Catheter, Date Placed:   [ ] Necessity of urinary, arterial, and venous catheters discussed    OTHER MEDICATIONS:  bacitracin   Ointment 1 Application(s) Topical two times a day PRN  chlorhexidine 2% Cloths 1 Application(s) Topical <User Schedule>      IMAGING:

## 2024-11-30 NOTE — CONSULT NOTE ADULT - ASSESSMENT
74 y/o male, retired urologist, with PMHx of HTN, DM, AFib, BPH, MASH cirrhosis and HCC s/p OLT 11/15/24. Post-op course c/b worsening mental status and re-intubation 11/20 for acute hypoxemic respiratory failure 2/2 aspiration, extubated 11/24; however,re-intubated 11/24. Pt then extubated 11/26. Then pt developed ileus on 11/29, ENT is consulted for NGT placement. Hood sump was advanced via right nostril.  Placement confirmed by auscultation of rumbles in stomach.  Pt tolerated procedure well without any complications.

## 2024-11-30 NOTE — PROGRESS NOTE ADULT - SUBJECTIVE AND OBJECTIVE BOX
24 HOUR EVENTS:  - Dilaudid decreased to 0.5mg  - Relistor x1  - S&S eval  - New facial droop and LUE rigidity per family, CTH negative.   - EEG: Mild diffuse cerebral dysfunction that is not specific in etiology. No epileptic discharges recorded. No seizures recorded.  - Continue on meropenem, confirm with transplant  - Liver doppler: Portal venous gas visualized with c/f mesenteric ischemia.   - CT abd/pel: Dilated small and large bowel consistent with an ileus. No evidence of portal venous gas as questioned on liver Doppler.  - F/u GI recs   - Red rubber rectal tube placed       NEURO  Exam: A7O x 2-3  Meds: acetaminophen   IVPB .. 500 milliGRAM(s) IV Intermittent every 6 hours PRN Mild Pain (1 - 3)  HYDROmorphone  Injectable 0.5 milliGRAM(s) IV Push every 4 hours PRN Breakthrough Pain  levETIRAcetam   Injectable 250 milliGRAM(s) IV Push every 12 hours  LORazepam   Injectable 0.5 milliGRAM(s) IV Push every 8 hours PRN Agitation      RESPIRATORY  RR: 26 (11-29-24 @ 23:00) (17 - 36)  SpO2: 96% (11-29-24 @ 23:00) (95% - 100%)  Exam: unlabored    Meds: buDESOnide    Inhalation Suspension 0.5 milliGRAM(s) Inhalation two times a day      CARDIOVASCULAR  HR: 110 (11-29-24 @ 23:00) (85 - 133)  BP: 119/68 (11-29-24 @ 23:00) (105/64 - 140/74)  BP(mean): 88 (11-29-24 @ 23:00) (76 - 101)  VBG - ( 29 Nov 2024 14:03 )  pH: 7.39  /  pCO2: 36    /  pO2: 53    / HCO3: 22    / Base Excess: -2.7  /  SaO2: 83.3   Lactate: 1.3        Exam:   Cardiac Rhythm:   Perfusion     [x ]Adequate   [ ]Inadequate  Mentation   [ ]Normal       [ x]Reduced  Extremities  [ x]Warm         [ ]Cool  Volume Status [ ]Hypervolemic [x ]Euvolemic [ ]Hypovolemic  Meds: digoxin  Injectable 125 MICROGram(s) IV Push daily  metoprolol tartrate Injectable 5 milliGRAM(s) IV Push every 4 hours      GI/NUTRITION  Exam: soft, non-tender, mild distention  Diet: NPO  Meds: pantoprazole  Injectable 40 milliGRAM(s) IV Push daily      GENITOURINARY  I&O's Detail    11-28 @ 07:01 - 11-29 @ 07:00  --------------------------------------------------------  IN:    Enteral Tube Flush: 540 mL    Free Water: 1500 mL    Glucerna 1.5: 1005 mL    IV PiggyBack: 100 mL    IV PiggyBack: 716.5 mL  Total IN: 3861.5 mL    OUT:    Bulb (mL): 880 mL    Voided (mL): 300 mL  Total OUT: 1180 mL    Total NET: 2681.5 mL      11-29 @ 07:01 - 11-30 @ 01:08  --------------------------------------------------------  IN:    Enteral Tube Flush: 240 mL    Free Water: 250 mL    Glucerna 1.5: 270 mL    IV PiggyBack: 333.3 mL    IV PiggyBack: 8.4 mL    Sodium Chloride 0.9% Bolus: 500 mL  Total IN: 1601.7 mL    OUT:    Bulb (mL): 635 mL    Oral Fluid: 0 mL    Rectal Tube (mL): 0 mL    Voided (mL): 400 mL  Total OUT: 1035 mL    Total NET: 566.7 mL          11-29    144  |  110[H]  |  58[H]  ----------------------------<  290[H]  4.2   |  20[L]  |  1.40[H]    Ca    7.2[L]      29 Nov 2024 12:31  Phos  3.7     11-29  Mg     2.4     11-29    TPro  4.3[L]  /  Alb  2.4[L]  /  TBili  0.9  /  DBili  x   /  AST  48[H]  /  ALT  52[H]  /  AlkPhos  243[H]  11-29    Meds:     HEMATOLOGIC  Meds: aspirin  chewable 81 milliGRAM(s) Enteral Tube daily  heparin   Injectable 5000 Unit(s) SubCutaneous every 12 hours                          10.8   18.62 )-----------( 225      ( 29 Nov 2024 06:13 )             36.1     PT/INR - ( 29 Nov 2024 06:13 )   PT: 13.3 sec;   INR: 1.17 ratio         PTT - ( 29 Nov 2024 06:13 )  PTT:25.1 sec    INFECTIOUS DISEASES  T(C): 36.6 (11-29-24 @ 23:00), Max: 37.4 (11-29-24 @ 11:00)  Wt(kg): --  WBC Count: 18.62 K/uL (11-29 @ 06:13)    Recent Cultures:  Specimen Source: .Blood BLOOD, 11-26 @ 06:25; Results   No growth at 72 Hours; Gram Stain: --; Organism: --  Specimen Source: .Blood BLOOD, 11-26 @ 04:50; Results   No growth at 72 Hours; Gram Stain: --; Organism: --  Specimen Source: Bronchial, 11-24 @ 17:25; Results   Few Candida glabrata[!]; Gram Stain: --; Organism: Candida (Torulopsis) glabrata[!]  Specimen Source: .Blood BLOOD, 11-24 @ 11:10; Results   No growth at 5 days; Gram Stain: --; Organism: --  Specimen Source: .Blood BLOOD, 11-24 @ 11:05; Results   No growth at 5 days; Gram Stain: --; Organism: --  Specimen Source: Combi-Cath, 11-23 @ 13:09; Results   Commensal charity consistent with body site; Gram Stain:   Moderate polymorphonuclear leukocytes per low power field  No squamous epithelial cells per low power field  No organisms seen; Organism: --    Meds: atovaquone  Suspension 1500 milliGRAM(s) Oral daily  cycloSPORINE  (SandIMMUNE) IVPB 50 milliGRAM(s) IV Intermittent daily  fluconAZOLE IVPB 400 milliGRAM(s) IV Intermittent every 24 hours  valGANciclovir 50 mG/mL Oral Solution 900 milliGRAM(s) Oral daily      ENDOCRINE  Capillary Blood Glucose    Meds: insulin lispro (ADMELOG) corrective regimen sliding scale   SubCutaneous every 6 hours  insulin NPH human recombinant 8 Unit(s) SubCutaneous every 6 hours  methylPREDNISolone sodium succinate Injectable 16 milliGRAM(s) IV Push daily        OTHER MEDICATIONS:  bacitracin   Ointment 1 Application(s) Topical two times a day PRN  chlorhexidine 2% Cloths 1 Application(s) Topical <User Schedule>      IMAGING:

## 2024-11-30 NOTE — CONSULT NOTE ADULT - ASSESSMENT
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 post op course combined  worsening mental status and reintubation 11/20 for acute hypoxemic respiratory failure 2/2 aspiration extubated 11/26, now nasal canula. GI consulted for ileus.    Assessment    Post op ileus.    Abdominal distention with significant improvement, now with bm.    Continue bowel regimen  Ct PT to help mobility.  Keep mg >2 and K >4  Avoid opiods.  Daily abdominal Exam and XR..  GI will sign off now    The above is not finalized until attending' s attestation.    Alla Gr, PGY4  Gastroenterology and Hepatology  Available on TEAMS    For non urgent consult, please email [email or web address] ([facility] or [email or web address] (For [facility])  Long range page number [phone]. 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 post op course combined  worsening mental status and reintubation 11/20 for acute hypoxemic respiratory failure 2/2 aspiration extubated 11/26, now nasal canula. GI consulted for ileus.    Assessment    Post op ileus.    Abdominal distention with significant improvement, now with bm.    Continue bowel regimen  Ct PT to help mobility.  Keep mg >2 and K >4  Avoid opioids.   Daily abdominal Exam and XR..  GI will sign off now    The above is not finalized until attending' s attestation.    Alla Gr, PGY4  Gastroenterology and Hepatology  Available on TEAMS    For non urgent consult, please email [email or web address] or [email or web address] (For [facility])  Long range page number [phone]. Short range [phone]

## 2024-11-30 NOTE — PROGRESS NOTE ADULT - ASSESSMENT
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 Post-op course c/b worsening mental status and re-intubation 11/20 for acute hypoxemic respiratory failure 2/2 aspiration, extubated 11/24 and re-intubated 11/24. Pt then extubated 11/26, now on nasal cannula.      PLAN:    Neuro:  - Off sedation   - pain control w/ Tylenol 500mg q6, oycodone 5/10 prn   - takes xanax 2mg TID at home, s/p Benzo taper  - CT head 11/19 and 11/21 negative  - CT head 11/25 - negative   - CT head 11/29 - negative   - EEG: Mild diffuse cerebral dysfunction that is not specific in etiology. No epileptic discharges recorded. No seizures recorded.  - Keppra: 500mg BID -> 250 BID   - Seroquel 25mg q12h  - Ativan 0.5mg q8h PRN for agitation  - Holding home xanax, Abilify, Zoloft, Remeron, Lexapro   - Ammonia 23     Resp:  - Intubated 11/20 2/2 acute hypoxemic respiratory failure 2/2 aspiration   - Extubated 11/24 and reintubated 11/24 d/t tachypnea   - Extubated 11/26   - Room air  - CT chest 11/25: Groundglass opacities in the right lower and left upper lobes, possibly infectious in nature.  - budesonide, duonebs, hypertonic saline    CV:  - goal MAP > 65 mmHg  - lactate clear  - Metoprolol 50mg q8  - Dig 125 qd  - Dig level (11/26) - 0.9    GI:   - NPO w/ NGT   - Ileus  - Relistor   - Protonix for stress ulcer prophylaxis  - ALYSSA x1, monitor output  - post-op liver doppler w/ patent vasculature   - Liver doppler 11/29: Portal venous gas visualized with c/f mesenteric ischemia.   - CT abd/pel 11/29: Dilated small and large bowel consistent with an ileus. No evidence of portal venous gas as questioned on liver Doppler.  - F/u GI recs   - Red rubber rectal tube placed 11/29    Renal:  - Monitor I&Os and electrolytes w/ repletions as necessary  - HyperNa, s/p free water 250 q6 (11/27-11/28)  - Finasteride     Heme:  - Monitor CBC and coags  - SQH BID for VTE prophylaxis  - ASA PO  - SCDs  - RUE duplex neg 11/24  - LUE duplex 11/27 superficial thrombophlebitis, neg DVT    ID:   - Transplant ppx fluconazole, therapeutic valcyte, mepron  - immunosuppression w/ steroids, cyclosporine, and Cellcept   - Blood cx 11/20 NGTD, 11/22 NGTD, 11/24 NGTD  - BAL 11/24 - candida glabrata (BAL fungitell >500)   - Serum fungitell neg  - Procal 0.21 -> 0.18 --> 0.32  - repeat CMV next week per ID    Endo:   - Monitor glucose   - Hold NPH 14u q6 while NPO   - moderate ISS  - post-transplant steroid taper     Code Status: full     Disposition: SICU    Nadia Taveras PA-C     [id number]

## 2024-11-30 NOTE — PROGRESS NOTE ADULT - ATTENDING COMMENTS
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 Post op course complicated by AF RVR, hypoxic respiratory failure.   S/p extubated, reintubated for worsening of resp distress    Mild waxing and waning of mental status, repeat CTH neg for acute path  On nocturnal Seroquel and prn small dose Ativan for agitation. Also on  Keppra dose at 250 mg BID. Avoid opoid   Tolerating extubation, CXR no acute finding, continue pul toileting, standing bronchodilators ICS  Not on pressure, on Lopressor for a fib with RVR, On Dig/dig level therapeutic  Repeat CTA SB and colonic dilatation, NGT to suction, red rubber cath decompression, one dose Relistor, possible Neostigmine if does not improve  Liver function normalized, Hepatic US shows good flow. No portal gas on CT A/P  Renal function improving, On Finasteride for retention. Change IVF to .45 saline sec to hyponatremia.  Abx Christopher. Off Linezolid. Leucocytosis resolving. Pt on systemic steroid taper.   ISS/NPH dose adjustment  Pharm DVT ppx SQH, Hb Pl stable  PT/mobilization    Anti rej: steroid taper, cyclosporine, cyclosporine level  PPx abx: Valcyte, Fluc, Bactrim on hold    Family kept updated

## 2024-11-30 NOTE — SWALLOW BEDSIDE ASSESSMENT ADULT - SLP PERTINENT HISTORY OF CURRENT PROBLEM
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 Post-op course c/b worsening mental status and re-intubation 11/20 for acute hypoxemic respiratory failure 2/2 aspiration, extubated 11/24; however,re-intubated 11/24. Pt then extubated 11/26. Then pt developed ileus on 11/29, ENT is consulted for NGT placement - s/p [place] sump feeding tube placement via right nostril on 11/30.

## 2024-11-30 NOTE — CONSULT NOTE ADULT - SUBJECTIVE AND OBJECTIVE BOX
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 Post-op course c/b worsening mental status and re-intubation 11/20 for acute hypoxemic respiratory failure 2/2 aspiration, extubated 11/24; however,re-intubated 11/24. Pt then extubated 11/26. Then pt developed ileus on 11/29, ENT is consulted for NGT placement. Pt is reportedly A&Ox3; however, lethargic and minimally communicating during encounter. Agreed for NGT placement.     PAST MEDICAL & SURGICAL HISTORY:  Diabetes  Transaminitis  Paroxysmal atrial fibrillation  Depression  BPH (benign prostatic hyperplasia)  Hypertension  Chronic atrial fibrillation  Coronary artery disease  Hepatocellular carcinoma  Cirrhosis    History of laparoscopic cholecystectomy  History of lumbar laminectomy  H/O prior ablation treatment  H/O percutaneous left heart catheterization    Allergies  No Known Allergies    MEDICATIONS  (STANDING):  aspirin  chewable 81 milliGRAM(s) Enteral Tube daily  atovaquone  Suspension 1500 milliGRAM(s) Oral daily  buDESOnide    Inhalation Suspension 0.5 milliGRAM(s) Inhalation two times a day  chlorhexidine 2% Cloths 1 Application(s) Topical <User Schedule>  cycloSPORINE  (SandIMMUNE) IVPB 50 milliGRAM(s) IV Intermittent daily  digoxin  Injectable 125 MICROGram(s) IV Push daily  fluconAZOLE IVPB 400 milliGRAM(s) IV Intermittent every 24 hours  heparin   Injectable 5000 Unit(s) SubCutaneous every 12 hours  insulin lispro (ADMELOG) corrective regimen sliding scale   SubCutaneous every 6 hours  insulin NPH human recombinant 8 Unit(s) SubCutaneous every 6 hours  levETIRAcetam   Injectable 250 milliGRAM(s) IV Push every 12 hours  methylPREDNISolone sodium succinate Injectable 16 milliGRAM(s) IV Push daily  metoprolol tartrate Injectable 5 milliGRAM(s) IV Push every 4 hours  pantoprazole  Injectable 40 milliGRAM(s) IV Push daily  potassium phosphate IVPB 30 milliMole(s) IV Intermittent once  valGANciclovir 50 mG/mL Oral Solution 900 milliGRAM(s) Oral daily    MEDICATIONS  (PRN):  acetaminophen   IVPB .. 500 milliGRAM(s) IV Intermittent every 6 hours PRN Mild Pain (1 - 3)  bacitracin   Ointment 1 Application(s) Topical two times a day PRN abrasions  HYDROmorphone  Injectable 0.5 milliGRAM(s) IV Push every 4 hours PRN Breakthrough Pain  LORazepam   Injectable 0.5 milliGRAM(s) IV Push every 8 hours PRN Agitation    Social History: unable to obtain due to pt being lethargic and minimally communicating   Family history: unable to obtain due to pt being lethargic and minimally communicating     ROS: unable to obtain due to pt being lethargic and minimally communicating     Vital Signs Last 24 Hrs  T(C): 36.6 (29 Nov 2024 23:00), Max: 37.4 (29 Nov 2024 11:00)  T(F): 97.9 (29 Nov 2024 23:00), Max: 99.3 (29 Nov 2024 11:00)  HR: 110 (29 Nov 2024 23:00) (85 - 133)  BP: 119/68 (29 Nov 2024 23:00) (105/64 - 140/74)  BP(mean): 88 (29 Nov 2024 23:00) (76 - 101)  RR: 26 (29 Nov 2024 23:00) (18 - 36)  SpO2: 96% (29 Nov 2024 23:00) (95% - 100%)    Parameters below as of 29 Nov 2024 19:00  Patient On (Oxygen Delivery Method): room air                    10.8   18.62 )-----------( 225      ( 29 Nov 2024 06:13 )             36.1    11-30    144  |  113[H]  |  54[H]  ----------------------------<  163[H]  3.7   |  19[L]  |  1.05    Ca    7.0[L]      30 Nov 2024 01:11  Phos  2.7     11-30  Mg     2.4     11-30    TPro  4.0[L]  /  Alb  2.1[L]  /  TBili  0.8  /  DBili  x   /  AST  73[H]  /  ALT  62[H]  /  AlkPhos  258[H]  11-30   PT/INR - ( 29 Nov 2024 06:13 )   PT: 13.3 sec;   INR: 1.17 ratio       PTT - ( 29 Nov 2024 06:13 )  PTT:25.1 sec    PHYSICAL EXAM:  Gen: NAD, sleeping,   Skin: No rashes, bruises, or lesions, staples on anterior of abdomen.   Head: on EEG. Normocephalic  Face: no edema, erythema, or fluctuance. Parotid glands soft without mass  Eyes: no scleral injection  Nose: Nares bilaterally patent, no discharge  Mouth: No Stridor / Drooling / Trismus.  Mucosa moist, tongue/uvula midline, oropharynx clear  Neck: Flat, supple, no lymphadenopathy, trachea midline, no masses  Lymphatic: No lymphadenopathy  Resp: breathing easily, no stridor  CV: no peripheral edema/cyanosis  GI: nondistended   Peripheral vascular: no JVD or edema  Neuro: no facial droop    #PROCEDURE  Procedure Note:  Procedure: NGT placement  Diagnosis: ileus    Patient was seen and examined at the bedside. Ng tube placement procedure was explained to the patient. Verbal consent obtained from patient. Head of the bed was elevated to 90 degrees. Placement of tube([place] sump) was attempted in the right nostril without resistance.  pt was asked to swallow during advancement. Placement confirmed by auscultation of rumbles in stomach. No complications, patient tolerated procedure well. Pending CXR confirmation.  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 Post-op course c/b worsening mental status and re-intubation 11/20 for acute hypoxemic respiratory failure 2/2 aspiration, extubated 11/24; however,re-intubated 11/24. Pt then extubated 11/26. Then pt developed ileus on 11/29, ENT is consulted for NGT placement. Pt is reportedly A&Ox3; however, lethargic and minimally communicating during encounter. Agreed for NGT placement.     PAST MEDICAL & SURGICAL HISTORY:  Diabetes  Transaminitis  Paroxysmal atrial fibrillation  Depression  BPH (benign prostatic hyperplasia)  Hypertension  Chronic atrial fibrillation  Coronary artery disease  Hepatocellular carcinoma  Cirrhosis    History of laparoscopic cholecystectomy  History of lumbar laminectomy  H/O prior ablation treatment  H/O percutaneous left heart catheterization    Allergies  No Known Allergies    MEDICATIONS  (STANDING):  aspirin  chewable 81 milliGRAM(s) Enteral Tube daily  atovaquone  Suspension 1500 milliGRAM(s) Oral daily  buDESOnide    Inhalation Suspension 0.5 milliGRAM(s) Inhalation two times a day  chlorhexidine 2% Cloths 1 Application(s) Topical <User Schedule>  cycloSPORINE  (SandIMMUNE) IVPB 50 milliGRAM(s) IV Intermittent daily  digoxin  Injectable 125 MICROGram(s) IV Push daily  fluconAZOLE IVPB 400 milliGRAM(s) IV Intermittent every 24 hours  heparin   Injectable 5000 Unit(s) SubCutaneous every 12 hours  insulin lispro (ADMELOG) corrective regimen sliding scale   SubCutaneous every 6 hours  insulin NPH human recombinant 8 Unit(s) SubCutaneous every 6 hours  levETIRAcetam   Injectable 250 milliGRAM(s) IV Push every 12 hours  methylPREDNISolone sodium succinate Injectable 16 milliGRAM(s) IV Push daily  metoprolol tartrate Injectable 5 milliGRAM(s) IV Push every 4 hours  pantoprazole  Injectable 40 milliGRAM(s) IV Push daily  potassium phosphate IVPB 30 milliMole(s) IV Intermittent once  valGANciclovir 50 mG/mL Oral Solution 900 milliGRAM(s) Oral daily    MEDICATIONS  (PRN):  acetaminophen   IVPB .. 500 milliGRAM(s) IV Intermittent every 6 hours PRN Mild Pain (1 - 3)  bacitracin   Ointment 1 Application(s) Topical two times a day PRN abrasions  HYDROmorphone  Injectable 0.5 milliGRAM(s) IV Push every 4 hours PRN Breakthrough Pain  LORazepam   Injectable 0.5 milliGRAM(s) IV Push every 8 hours PRN Agitation    Social History: unable to obtain due to pt being lethargic and minimally communicating   Family history: unable to obtain due to pt being lethargic and minimally communicating     ROS: unable to obtain due to pt being lethargic and minimally communicating     Vital Signs Last 24 Hrs  T(C): 36.6 (29 Nov 2024 23:00), Max: 37.4 (29 Nov 2024 11:00)  T(F): 97.9 (29 Nov 2024 23:00), Max: 99.3 (29 Nov 2024 11:00)  HR: 110 (29 Nov 2024 23:00) (85 - 133)  BP: 119/68 (29 Nov 2024 23:00) (105/64 - 140/74)  BP(mean): 88 (29 Nov 2024 23:00) (76 - 101)  RR: 26 (29 Nov 2024 23:00) (18 - 36)  SpO2: 96% (29 Nov 2024 23:00) (95% - 100%)    Parameters below as of 29 Nov 2024 19:00  Patient On (Oxygen Delivery Method): room air                    10.8   18.62 )-----------( 225      ( 29 Nov 2024 06:13 )             36.1    11-30    144  |  113[H]  |  54[H]  ----------------------------<  163[H]  3.7   |  19[L]  |  1.05    Ca    7.0[L]      30 Nov 2024 01:11  Phos  2.7     11-30  Mg     2.4     11-30    TPro  4.0[L]  /  Alb  2.1[L]  /  TBili  0.8  /  DBili  x   /  AST  73[H]  /  ALT  62[H]  /  AlkPhos  258[H]  11-30   PT/INR - ( 29 Nov 2024 06:13 )   PT: 13.3 sec;   INR: 1.17 ratio       PTT - ( 29 Nov 2024 06:13 )  PTT:25.1 sec    PHYSICAL EXAM:  Gen: NAD, sleeping,   Skin: No rashes, bruises, or lesions, staples on anterior of abdomen.   Head: on EEG. Normocephalic  Face: no edema, erythema, or fluctuance. Parotid glands soft without mass  Eyes: no scleral injection  Nose: Nares bilaterally patent, no discharge  Mouth: No Stridor / Drooling / Trismus.  Mucosa moist, tongue/uvula midline, oropharynx clear  Neck: Flat, supple, no lymphadenopathy, trachea midline, no masses  Lymphatic: No lymphadenopathy  Resp: breathing easily, no stridor  GI: distended   Neuro: no facial droop    #PROCEDURE  Procedure Note:  Procedure: NGT placement  Diagnosis: ileus    Patient was seen and examined at the bedside. Ng tube placement procedure was explained to the patient. Verbal consent obtained from patient. Head of the bed was elevated to 90 degrees. Placement of tube([place] sump) was attempted in the right nostril without resistance.  pt was asked to swallow during advancement. Placement confirmed by auscultation of rumbles in stomach. No complications, patient tolerated procedure well. Pending CXR confirmation.

## 2024-11-30 NOTE — CONSULT NOTE ADULT - SUBJECTIVE AND OBJECTIVE BOX
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 post op course combined  worsening mental status and reintubation 11/20 for acute hypoxemic respiratory failure 2/2 aspiration extubated 11/26, now nasal canula. per team, patient was constipated for several days, distended abdomen. CT abd showing dilated small and large bowel consistent with ileus. GI consulted for evaluation.   On evaluation today, patient's abdominal distention significantly improved and is having bowel movt. rectal tube in place.     PAST MEDICAL & SURGICAL HISTORY:  Diabetes      Transaminitis      Paroxysmal atrial fibrillation      Depression      BPH (benign prostatic hyperplasia)      Hypertension      Chronic atrial fibrillation      Coronary artery disease      Hepatocellular carcinoma      DM (diabetes mellitus)      HTN (hypertension)      Paroxysmal atrial fibrillation      Cirrhosis      HCC (hepatocellular carcinoma)      History of BPH      History of laparoscopic cholecystectomy      History of lumbar laminectomy      H/O prior ablation treatment      H/O percutaneous left heart catheterization        FAMILY HISTORY:  Family history of coronary artery disease (Father, Sibling, Sibling)    Family history of diabetes mellitus (Father, Mother)    Family history of coronary artery disease (Sibling)        MEDS:  MEDICATIONS  (STANDING):  aspirin  chewable 81 milliGRAM(s) Enteral Tube daily  atovaquone  Suspension 1500 milliGRAM(s) Oral daily  buDESOnide    Inhalation Suspension 0.5 milliGRAM(s) Inhalation two times a day  chlorhexidine 2% Cloths 1 Application(s) Topical <User Schedule>  cycloSPORINE  , modified (GENGRAF) Solution 50 milliGRAM(s) Oral <User Schedule>  digoxin  Injectable 125 MICROGram(s) IV Push daily  fluconAZOLE IVPB 400 milliGRAM(s) IV Intermittent every 24 hours  heparin   Injectable 5000 Unit(s) SubCutaneous every 12 hours  insulin lispro (ADMELOG) corrective regimen sliding scale   SubCutaneous every 6 hours  insulin NPH human recombinant 8 Unit(s) SubCutaneous every 6 hours  levETIRAcetam  IVPB 250 milliGRAM(s) IV Intermittent every 12 hours  methylPREDNISolone sodium succinate Injectable 16 milliGRAM(s) IV Push daily  metoprolol tartrate Injectable 5 milliGRAM(s) IV Push every 4 hours  pantoprazole  Injectable 40 milliGRAM(s) IV Push daily  sodium chloride 0.45%. 1000 milliLiter(s) (75 mL/Hr) IV Continuous <Continuous>  valGANciclovir 50 mG/mL Oral Solution 900 milliGRAM(s) Oral daily    MEDICATIONS  (PRN):  acetaminophen   IVPB .. 500 milliGRAM(s) IV Intermittent every 6 hours PRN Mild Pain (1 - 3)  bacitracin   Ointment 1 Application(s) Topical two times a day PRN abrasions  LORazepam   Injectable 0.5 milliGRAM(s) IV Push every 8 hours PRN Agitation    Allergies    No Known Allergies    Intolerances          CONSTITUTIONAL:  No weight loss, fever, chills, weakness or fatigue.  HEENT:  Eyes:  No visual loss, blurred vision, double vision or yellow sclerae.  SKIN:  No rash or itching.  CARDIOVASCULAR:  No chest pain, chest pressure or chest discomfort.  RESPIRATORY:  No shortness of breath, cough or sputum.  GASTROINTESTINAL:  SEE HPI  GENITOURINARY:  No dysuria, hematuria, urinary frequency  NEUROLOGICAL:  No headache, dizziness, syncope, paralysis, ataxia, numbness or tingling in the extremities.  MUSCULOSKELETAL:  No muscle, back pain, joint pain or stiffness.  ENDOCRINOLOGIC:  No reports of sweating, cold or heat intolerance.     ______________________________________________________________________  PHYSICAL EXAM:  T(C): 36.9 (11-30-24 @ 15:00), Max: 37.5 (11-30-24 @ 03:00)  HR: 96 (11-30-24 @ 18:00)  BP: 132/64 (11-30-24 @ 18:00)  RR: 15 (11-30-24 @ 18:00)  SpO2: 98% (11-30-24 @ 18:00)  Wt(kg): --    11-29 - 11-30  --------------------------------------------------------  IN:    Enteral Tube Flush: 240 mL    Free Water: 250 mL    Glucerna 1.5: 270 mL    IV PiggyBack: 333.3 mL    IV PiggyBack: 42 mL    Sodium Chloride 0.9% Bolus: 500 mL  Total IN: 1635.3 mL    OUT:    Bulb (mL): 885 mL    Intermittent Catheterization - Urethral (mL): 575 mL    Nasogastric/Oral tube (mL): 75 mL    Oral Fluid: 0 mL    Rectal Tube (mL): 225 mL    Voided (mL): 400 mL  Total OUT: 2160 mL    Total NET: -524.7 mL      11-30  -  11-30  --------------------------------------------------------  IN:    IV PiggyBack: 21 mL    IV PiggyBack: 466.6 mL    sodium chloride 0.45%: 600 mL  Total IN: 1087.6 mL    OUT:    Bulb (mL): 280 mL    Nasogastric/Oral tube (mL): 240 mL    Rectal Tube (mL): 300 mL    Voided (mL): 50 mL  Total OUT: 870 mL    Total NET: 217.6 mL          GEN: NAD, normocephalic  CVS: S1S2+  CHEST: clear to auscultation  ABD: soft , nontender, nondistended, bowel sounds present  EXTR: no cyanosis, no clubbing, no edema  NEURO: A&OX  SKIN:  warm;  non icteric    ______________________________________________________________________  LABS:                        10.4   18.73 )-----------( 203      ( 30 Nov 2024 06:10 )             34.5     11-30    149[H]  |  116[H]  |  52[H]  ----------------------------<  175[H]  3.5   |  21[L]  |  1.09    Ca    7.0[L]      30 Nov 2024 15:10  Phos  2.8     11-30  Mg     2.4     11-30    TPro  3.8[L]  /  Alb  2.2[L]  /  TBili  0.7  /  DBili  x   /  AST  25  /  ALT  49[H]  /  AlkPhos  215[H]  11-30    LIVER FUNCTIONS - ( 30 Nov 2024 15:10 )  Alb: 2.2 g/dL / Pro: 3.8 g/dL / ALK PHOS: 215 U/L / ALT: 49 U/L / AST: 25 U/L / GGT: x           PT/INR - ( 30 Nov 2024 06:10 )   PT: 14.3 sec;   INR: 1.25 ratio         PTT - ( 30 Nov 2024 06:10 )  PTT:27.1 sec  ____________________________________________

## 2024-11-30 NOTE — PROGRESS NOTE ADULT - ASSESSMENT
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 Complex collection within the gallbladder fossa, decreased in size from 11/18/2024.  - watch leukocytosis and fever curve, cultures negative, concern for aspiration PNA and possible ileus on imaging 11/21. Transplant ID following. on Meropenem/linezolid/fluc, cultures clear  - 11/25 CT head neg, CT CAP - R lower, L upper lobe opacities  - Tube feeds, keep NGT for now  - Strict I&Os   - ASA/SQH  - increase free water flushes for hypernatremia    [ ] Immunosuppression  - Cyclo per level, MMF HELD, pred 20  - SIMULECT completed  - Off tacro  - PPx: Valcyte 900---treating as high risk/Mepron/PPI/OsCal/Fluc    [ ] AMS   -Reintubated 11/20 Aspiration/hypoxia, extubated 11/24->[id number]--> extubated 11/26  -Per family PT was on higher dose Xanax PO, Transplant Psych following  -EEG 11/20: Mild diffuse cerebral dysfunction is nonspecific in etiology. No epileptiform abnormalities or seizures.  -neurology following  -off tacro, now on cyclo  -on keppra and ativan taper  -Psych recs 911/27): Ativan 0.5mg PO TID PRN for acute anxiety alleviation if refractory to Seroquel, -Combine Seroquel  50mg PO HS if QTC < 500 for ICU delirium, consider Seroquel 12.5mg PO TID PRN for acute anxiety alleviation if QTC < 500    -on EEG    [ ] HTN/ pAFib  - BB as able  - EP following, ongoing discussions about AYDEN/Cardioversions/full A/C  - Dr. 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 Complex collection within the gallbladder fossa, decreased in size from 11/18/2024.  - watch leukocytosis and fever curve, cultures negative, concern for aspiration PNA and possible ileus on imaging 11/21. Transplant ID following. on Meropenem/linezolid/fluc, cultures clear  - 11/25 CT head neg, CT CAP - R lower, L upper lobe opacities  - US liver concerning for PV gas, CTAP patent vessels, distented colon -> Rectal tube placed  - Tube feeds, keep NGT for now  - Strict I&Os   - ASA/SQH  - increase free water flushes for hypernatremia    [ ] Immunosuppression  - Cyclo per level, MMF HELD, pred 20  - SIMULECT completed  - Off tacro  - PPx: Valcyte 900---treating as high risk/Mepron/PPI/OsCal/Fluc    [ ] AMS   -Reintubated 11/20 Aspiration/hypoxia, extubated 11/24->[id number]--> extubated 11/26  -Per family PT was on higher dose Xanax PO, Transplant Psych following  -EEG 11/20: Mild diffuse cerebral dysfunction is nonspecific in etiology. No epileptiform abnormalities or seizures.  -neurology following  -off tacro, now on cyclo  -on keppra  -Psych recs 911/27): Ativan 0.5mg PO TID PRN for acute anxiety alleviation if refractory to Seroquel, -Combine Seroquel  50mg PO HS if QTC < 500 for ICU delirium, consider Seroquel 12.5mg PO TID PRN for acute anxiety alleviation if QTC < 500    -11/29 EEG neg seizures    [ ] HTN/ pAFib  - BB as able  - EP following, ongoing discussions about AYDEN/Cardioversions/full A/C  - Dr. 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 keep Meropenem/Fluc for now given colonic ileus, cultures clear  - 11/25 CT head neg, CT CAP - R lower, L upper lobe opacities  - 11/29 CTH neg, CTAP patent vessels, distended colon -> Rectal tube placed 11/29, continue, and trial Neostigmine today  - hold tube feeds, keep NGT for now  - Strict I&Os   - ASA/SQH  - free water flushes for hypernatremia    [ ] Immunosuppression  - Cyclo per level, MMF HELD, pred 20  - SIMULECT completed  - Off tacro  - PPx: Valcyte 900---treating as high risk/Mepron/PPI/OsCal/Fluc    [ ] AMS   -Reintubated 11/20 Aspiration/hypoxia, extubated 11/24->[id number]/24--> extubated 11/26  -Per family PT was on higher dose Xanax PO, Transplant Psych following  -EEG 11/20: Mild diffuse cerebral dysfunction is nonspecific in etiology. No epileptiform abnormalities or seizures.  -neurology following  -off tacro, now on cyclo  -on keppra  -Psych recs 911/27): Ativan 0.5mg PO TID PRN for acute anxiety alleviation if refractory to Seroquel, -Combine Seroquel  50mg PO HS if QTC < 500 for ICU delirium, consider Seroquel 12.5mg PO TID PRN for acute anxiety alleviation if QTC < 500    -11/29 EEG neg seizures  -would ease off on additional ativan/narcotics to improve mental status    [ ] HTN/ pAFib  - BB as needed  - EP following, ongoing discussions about AYDEN/Cardioversions/full A/C  - Dr. Quintanilla following  - watch Digoxin levels    [ ] DM  -Lantus/Lispro, adjust prn

## 2024-11-30 NOTE — PROGRESS NOTE ADULT - SUBJECTIVE AND OBJECTIVE BOX
Transplant Surgery - Multidisciplinary Progress Note   --------------------------------------------------------------  OLT   11/15/2024        POD#15    Present: Patient seen and examined with multidisciplinary Transplant team including Surgeon: Dr. James, Dr. Sorto, JAZZ Fisher, Hepatologist: Dr. Luis, SICU team in AM rounds. Disciplines not in attendance will be notified of the plan.     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    Interval Events:  - afebrile, vss  - remains w/ sluggish MS, EEG neg  - facial droop-. CTH neg  - CTAP: distended colon, rectal tube placed      Immunosuppression:  -Cyclo per level, MMF HELD, pred 20  -ongoing monitoring for signs of rejection  Potential Discharge date: Pending clinical course         MEDICATIONS  (STANDING):  albuterol/ipratropium for Nebulization 3 milliLiter(s) Nebulizer every 6 hours  aspirin  chewable 81 milliGRAM(s) Enteral Tube daily  atovaquone  Suspension 1500 milliGRAM(s) Oral daily  buDESOnide    Inhalation Suspension 0.5 milliGRAM(s) Inhalation two times a day  calcium carbonate 1250 mG  + Vitamin D (OsCal 500 + D) 1 Tablet(s) Oral every 12 hours  chlorhexidine 2% Cloths 1 Application(s) Topical <User Schedule>  cycloSPORINE  , modified (GENGRAF) Solution 50 milliGRAM(s) Enteral Tube <User Schedule>  digoxin  Injectable 125 MICROGram(s) IV Push daily  finasteride 5 milliGRAM(s) Oral daily  fluconAZOLE IVPB 400 milliGRAM(s) IV Intermittent every 24 hours  heparin   Injectable 5000 Unit(s) SubCutaneous every 12 hours  insulin lispro (ADMELOG) corrective regimen sliding scale   SubCutaneous every 6 hours  insulin NPH human recombinant 8 Unit(s) SubCutaneous every 6 hours  levETIRAcetam  Solution 250 milliGRAM(s) Oral every 12 hours  meropenem  IVPB 1000 milliGRAM(s) IV Intermittent every 8 hours  metoprolol tartrate 50 milliGRAM(s) Enteral Tube every 8 hours  pantoprazole  Injectable 40 milliGRAM(s) IV Push daily  polyethylene glycol 3350 17 Gram(s) Oral every 24 hours  predniSONE   Tablet 20 milliGRAM(s) Oral daily  QUEtiapine 50 milliGRAM(s) Oral at bedtime  senna Syrup 10 milliLiter(s) Oral every 24 hours  ursodiol Suspension 300 milliGRAM(s) Oral every 12 hours  valGANciclovir 50 mG/mL Oral Solution 900 milliGRAM(s) Oral daily    MEDICATIONS  (PRN):  acetaminophen   IVPB .. 500 milliGRAM(s) IV Intermittent every 6 hours PRN Mild Pain (1 - 3)  bacitracin   Ointment 1 Application(s) Topical two times a day PRN abrasions  HYDROmorphone  Injectable 0.5 milliGRAM(s) IV Push every 3 hours PRN Breakthrough Pain  LORazepam     Tablet 0.5 milliGRAM(s) Oral every 8 hours PRN Agitation  oxyCODONE    Solution 5 milliGRAM(s) Enteral Tube every 4 hours PRN Moderate Pain (4 - 6)  oxyCODONE    Solution 10 milliGRAM(s) Enteral Tube every 4 hours PRN Severe Pain (7 - 10)  QUEtiapine 12.5 milliGRAM(s) Oral every 8 hours PRN agitation      PAST MEDICAL & SURGICAL HISTORY:  Diabetes      Transaminitis      Paroxysmal atrial fibrillation      Depression      BPH (benign prostatic hyperplasia)      Hypertension      Chronic atrial fibrillation      Coronary artery disease      Hepatocellular carcinoma      DM (diabetes mellitus)      HTN (hypertension)      Paroxysmal atrial fibrillation      Cirrhosis      HCC (hepatocellular carcinoma)      History of BPH      History of laparoscopic cholecystectomy      History of lumbar laminectomy      H/O prior ablation treatment      H/O percutaneous left heart catheterization          Vital Signs Last 24 Hrs  T(C): 36.8 (29 Nov 2024 07:00), Max: 37.1 (28 Nov 2024 23:00)  T(F): 98.2 (29 Nov 2024 07:00), Max: 98.8 (28 Nov 2024 23:00)  HR: 105 (29 Nov 2024 10:00) (85 - 116)  BP: 109/72 (29 Nov 2024 10:00) (105/64 - 143/76)  BP(mean): 87 (29 Nov 2024 10:00) (76 - 103)  RR: 24 (29 Nov 2024 10:00) (11 - 31)  SpO2: 96% (29 Nov 2024 10:00) (93% - 100%)    Parameters below as of 29 Nov 2024 08:00  Patient On (Oxygen Delivery Method): room air        I&O's Summary    28 Nov 2024 07:01  -  29 Nov 2024 07:00  --------------------------------------------------------  IN: 3861.5 mL / OUT: 1180 mL / NET: 2681.5 mL    29 Nov 2024 07:01  -  29 Nov 2024 11:29  --------------------------------------------------------  IN: 323.3 mL / OUT: 160 mL / NET: 163.3 mL                              10.8   18.62 )-----------( 225      ( 29 Nov 2024 06:13 )             36.1     11-29    145  |  114[H]  |  56[H]  ----------------------------<  60[L]  3.6   |  24  |  1.12    Ca    7.1[L]      29 Nov 2024 00:09  Phos  2.6     11-29  Mg     2.3     11-29    TPro  3.8[L]  /  Alb  2.3[L]  /  TBili  0.7  /  DBili  x   /  AST  47[H]  /  ALT  44  /  AlkPhos  206[H]  11-29          Culture - Blood (collected 11-26-24 @ 06:25)  Source: .Blood BLOOD  Preliminary Report (11-28-24 @ 11:01):    No growth at 48 Hours    Culture - Blood (collected 11-26-24 @ 04:50)  Source: .Blood BLOOD  Preliminary Report (11-28-24 @ 11:01):    No growth at 48 Hours    Culture - Fungal, Bronchial (collected 11-24-24 @ 17:25)  Source: Bronchial  Preliminary Report (11-27-24 @ 14:03):    Few Candida glabrata    Susceptibility to follow.    Culture - Blood (collected 11-24-24 @ 11:10)  Source: .Blood BLOOD  Preliminary Report (11-28-24 @ 15:01):    No growth at 4 days    Culture - Blood (collected 11-24-24 @ 11:05)  Source: .Blood BLOOD  Preliminary Report (11-28-24 @ 15:01):    No growth at 4 days    Culture - Bronchial (collected 11-23-24 @ 13:09)  Source: Combi-Cath  Gram Stain (11-23-24 @ 23:07):    Moderate polymorphonuclear leukocytes per low power field    No squamous epithelial cells per low power field    No organisms seen  Final Report (11-25-24 @ 07:50):    Commensal charity consistent with body site    Culture - Blood (collected 11-22-24 @ 17:47)  Source: .Blood BLOOD  Final Report (11-27-24 @ 23:07):    No growth at 5 days    Culture - Blood (collected 11-22-24 @ 17:47)  Source: .Blood BLOOD  Final Report (11-27-24 @ 23:07):    No growth at 5 days                    Review of systems  All other systems were reviewed and are negative, except as noted.              PHYSICAL EXAM:  Constitutional: extubated , NAD  Eyes:  PERRLA  ENMT: nc/at, no thrush, NGT  Neck: supple   Respiratory: CTA B/L  Cardiovascular: RRR  Gastrointestinal: +Distended abdomen though improving, appropriate incisional TTP. chevron incision c/d/i, staples in place. No signs of infection.  ALYSSA#2ss  Genitourinary: Voiding via nichols,   Extremities: SCD's in place and working bilaterally  Neurological: A&O x1, extubated  Skin: no rashes, ulcerations, lesions   Transplant Surgery - Multidisciplinary Progress Note   --------------------------------------------------------------  OLT   11/15/2024        POD#15    Present: Patient seen and examined with multidisciplinary Transplant team including Surgeon: Dr. James, Dr. Sorto, JAZZ Fisher, Hepatologist: Dr. Luis, SICU team in AM rounds. Disciplines not in attendance will be notified of the plan.     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    Interval Events:  - afebrile, vss  - remains w/ sluggish MS, EEG neg  - facial droop-. CTH neg  - CTAP: distended colon, rectal tube placed      Immunosuppression:  -Cyclo per level, MMF HELD, pred 20  -ongoing monitoring for signs of rejection  Potential Discharge date: Pending clinical course       TX DATA HERE    Review of systems  All other systems were reviewed and are negative, except as noted.      PHYSICAL EXAM:  Constitutional: extubated , NAD  Eyes:  PERRLA  ENMT: nc/at, no thrush, NGT  Neck: supple   Respiratory: CTA B/L  Cardiovascular: RRR  Gastrointestinal: +Distended abdomen though improving, appropriate incisional TTP. chevron incision c/d/i, staples in place. No signs of infection.  ALYSSA#2ss  Genitourinary: Voiding via nichols, rectal tube placed  Extremities: SCD's in place and working bilaterally  Neurological: A&O x1, extubated  Skin: no rashes, ulcerations, lesions   Transplant Surgery - Multidisciplinary Progress Note   --------------------------------------------------------------  OLT   11/15/2024        POD#15    Present: Patient seen and examined with multidisciplinary Transplant team including Surgeon: Dr. James, Dr. Sorto, JAZZ Fisher, Hepatologist: Dr. Luis, SICU team in AM rounds. Disciplines not in attendance will be notified of the plan.     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    Interval Events:  - afebrile, vss  - remains w/ sluggish MS, EEG neg, responsive to questions this morning  - facial droop yesterday per family. CTH neg  - CTAP: distended colon, rectal tube placed, graft with good flow    Immunosuppression:  -Cyclo per level, MMF HELD, pred 20  -ongoing monitoring for signs of rejection  Potential Discharge date: Pending clinical course         MEDICATIONS  (STANDING):  aspirin  chewable 81 milliGRAM(s) Enteral Tube daily  atovaquone  Suspension 1500 milliGRAM(s) Oral daily  buDESOnide    Inhalation Suspension 0.5 milliGRAM(s) Inhalation two times a day  chlorhexidine 2% Cloths 1 Application(s) Topical <User Schedule>  cycloSPORINE  , modified (GENGRAF) Solution 50 milliGRAM(s) Oral <User Schedule>  digoxin  Injectable 125 MICROGram(s) IV Push daily  fluconAZOLE IVPB 400 milliGRAM(s) IV Intermittent every 24 hours  heparin   Injectable 5000 Unit(s) SubCutaneous every 12 hours  insulin lispro (ADMELOG) corrective regimen sliding scale   SubCutaneous every 6 hours  insulin NPH human recombinant 8 Unit(s) SubCutaneous every 6 hours  levETIRAcetam  IVPB 250 milliGRAM(s) IV Intermittent every 12 hours  methylPREDNISolone sodium succinate Injectable 16 milliGRAM(s) IV Push daily  metoprolol tartrate Injectable 5 milliGRAM(s) IV Push every 4 hours  pantoprazole  Injectable 40 milliGRAM(s) IV Push daily  sodium chloride 0.45%. 1000 milliLiter(s) (75 mL/Hr) IV Continuous <Continuous>  valGANciclovir 50 mG/mL Oral Solution 900 milliGRAM(s) Oral daily    MEDICATIONS  (PRN):  acetaminophen   IVPB .. 500 milliGRAM(s) IV Intermittent every 6 hours PRN Mild Pain (1 - 3)  bacitracin   Ointment 1 Application(s) Topical two times a day PRN abrasions  LORazepam   Injectable 0.5 milliGRAM(s) IV Push every 8 hours PRN Agitation      PAST MEDICAL & SURGICAL HISTORY:  Diabetes      Transaminitis      Paroxysmal atrial fibrillation      Depression      BPH (benign prostatic hyperplasia)      Hypertension      Chronic atrial fibrillation      Coronary artery disease      Hepatocellular carcinoma      DM (diabetes mellitus)      HTN (hypertension)      Paroxysmal atrial fibrillation      Cirrhosis      HCC (hepatocellular carcinoma)      History of BPH      History of laparoscopic cholecystectomy      History of lumbar laminectomy      H/O prior ablation treatment      H/O percutaneous left heart catheterization          Vital Signs Last 24 Hrs  T(C): 36.8 (30 Nov 2024 11:00), Max: 37.5 (30 Nov 2024 03:00)  T(F): 98.2 (30 Nov 2024 11:00), Max: 99.5 (30 Nov 2024 03:00)  HR: 109 (30 Nov 2024 11:00) (94 - 133)  BP: 125/65 (30 Nov 2024 11:00) (109/58 - 140/74)  BP(mean): 88 (30 Nov 2024 11:00) (77 - 101)  RR: 20 (30 Nov 2024 11:00) (13 - 36)  SpO2: 97% (30 Nov 2024 11:00) (95% - 99%)    Parameters below as of 30 Nov 2024 08:49  Patient On (Oxygen Delivery Method): room air        I&O's Summary    29 Nov 2024 07:01  -  30 Nov 2024 07:00  --------------------------------------------------------  IN: 1635.3 mL / OUT: 2160 mL / NET: -524.7 mL    30 Nov 2024 07:01  -  30 Nov 2024 13:00  --------------------------------------------------------  IN: 371 mL / OUT: 70 mL / NET: 301 mL                              10.4   18.73 )-----------( 203      ( 30 Nov 2024 06:10 )             34.5     11-30    144  |  113[H]  |  54[H]  ----------------------------<  163[H]  3.7   |  19[L]  |  1.05    Ca    7.0[L]      30 Nov 2024 01:11  Phos  2.7     11-30  Mg     2.4     11-30    TPro  4.0[L]  /  Alb  2.1[L]  /  TBili  0.8  /  DBili  x   /  AST  73[H]  /  ALT  62[H]  /  AlkPhos  258[H]  11-30          Culture - Blood (collected 11-26-24 @ 06:25)  Source: .Blood BLOOD  Preliminary Report (11-30-24 @ 11:01):    No growth at 4 days    Culture - Blood (collected 11-26-24 @ 04:50)  Source: .Blood BLOOD  Preliminary Report (11-30-24 @ 11:01):    No growth at 4 days    Culture - Fungal, Bronchial (collected 11-24-24 @ 17:25)  Source: Bronchial  Preliminary Report (11-29-24 @ 14:07):    Few Candida glabrata  Organism: Candida (Torulopsis) glabrata (11-29-24 @ 14:06)  Organism: Candida (Torulopsis) glabrata (11-29-24 @ 14:06)    Culture - Blood (collected 11-24-24 @ 11:10)  Source: .Blood BLOOD  Final Report (11-29-24 @ 15:00):    No growth at 5 days    Culture - Blood (collected 11-24-24 @ 11:05)  Source: .Blood BLOOD  Final Report (11-29-24 @ 15:00):    No growth at 5 days    Culture - Bronchial (collected 11-23-24 @ 13:09)  Source: Combi-Cath  Gram Stain (11-23-24 @ 23:07):    Moderate polymorphonuclear leukocytes per low power field    No squamous epithelial cells per low power field    No organisms seen  Final Report (11-25-24 @ 07:50):    Commensal charity consistent with body site                Review of systems  All other systems were reviewed and are negative, except as noted.      PHYSICAL EXAM:  Constitutional: extubated , NAD  Eyes:  PERRLA  ENMT: nc/at, no thrush, NGT  Neck: supple   Respiratory: CTA B/L  Cardiovascular: RRR  Gastrointestinal: +Distended abdomen though improving, appropriate incisional TTP. chevron incision c/d/i, staples in place. No signs of infection.  ALYSSA#2ss  Genitourinary: Voiding via nichols, rectal tube placed  Extremities: SCD's in place and working bilaterally  Neurological: A&O x1, extubated  Skin: no rashes, ulcerations, lesions

## 2024-11-30 NOTE — CONSULT NOTE ADULT - PROBLEM SELECTOR RECOMMENDATION 9
- s/p kiersten rivera feeding tube placement via right nostril  - please obtain Chest XR for final confirmation   - No further ENT intervention   - Please page or call [phone] if concerns or issues.

## 2024-12-01 NOTE — PROGRESS NOTE ADULT - ATTENDING COMMENTS
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 Post op course complicated by AF RVR, hypoxic respiratory failure.   S/p extubated, reintubated for worsening of resp distress    Very awake and alert today,  On nocturnal Seroquel and prn small dose Ativan for agitation. Also on  Keppra dose at 250 mg BID. Avoid opoid   TCXR no acute finding, continue pul toileting, standing bronchodilators ICS  Not on pressure, change IV to NGT on Lopressor for a fib with RVR, On Dig/dig level therapeutic  Repeat abd XRAY shows improving bowel dilatation, responded to rectal tube decompression and Relistor, will start low dose tube feed,   Liver function normalized, Hepatic US shows good flow. No portal gas on CT A/P  Renal function normal now On Finasteride for retention. Change IVF to 0.225 saline and add free water via NGT for worsening hypernatremia.  Abx Christopher. Off Linezolid. Leucocytosis resolving. Pt on systemic steroid taper.   ISS/NPH dose adjustment  Pharm DVT ppx SQH, Hb Pl stable  PT/mobilization    Anti rej: steroid taper, cyclosporine, cyclosporine level  PPx abx: Valcyte, Fluc, Bactrim on hold    Family kept updated

## 2024-12-01 NOTE — PROGRESS NOTE ADULT - ASSESSMENT
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 Post-op course c/b worsening mental status and re-intubation 11/20 for acute hypoxemic respiratory failure 2/2 aspiration, extubated 11/24 and re-intubated 11/24. Pt then extubated 11/26, now on nasal cannula.      PLAN:  Neuro:  - Off sedation   - pain control w/ Tylenol 500mg q6, oycodone 5/10 prn   - takes xanax 2mg TID at home, s/p Benzo taper  - CT head 11/19 and 11/21 negative  - CT head 11/25 - negative   - CT head 11/29 - negative   - EEG: Mild diffuse cerebral dysfunction that is not specific in etiology. No epileptic discharges recorded. No seizures recorded.  - Keppra: 500mg BID -> 250 BID   - Seroquel 25mg q12h  - Ativan 0.5mg q8h PRN for agitation  - Holding home xanax, Abilify, Zoloft, Remeron, Lexapro   - Ammonia 23     Resp:  - Intubated 11/20 2/2 acute hypoxemic respiratory failure 2/2 aspiration   - Extubated 11/24 and reintubated 11/24 d/t tachypnea   - Extubated 11/26   - Room air  - CT chest 11/25: Groundglass opacities in the right lower and left upper lobes, possibly infectious in nature.  - budesonide, duonebs, hypertonic saline    CV:  - goal MAP > 65 mmHg  - lactate clear  - Metoprolol 50mg q8  - Dig 125 qd  - Dig level (11/26) - 0.9    GI:   - NPO w/ NGT   - Ileus  - Relistor   - Protonix for stress ulcer prophylaxis  - ALYSSA x1, monitor output  - post-op liver doppler w/ patent vasculature   - Liver doppler 11/29: Portal venous gas visualized with c/f mesenteric ischemia.   - CT abd/pel 11/29: Dilated small and large bowel consistent with an ileus. No evidence of portal venous gas as questioned on liver Doppler.  - F/u GI recs   - Red rubber rectal tube placed 11/29, now coude    Renal:  - Monitor I&Os and electrolytes w/ repletions as necessary  - HyperNa, s/p free water 250 q6 (11/27-11/28)  - Finasteride   - NaCl 0.45% @ 75cc/hr    Heme:  - Monitor CBC and coags  - SQH BID for VTE prophylaxis  - ASA PO  - SCDs  - RUE duplex neg 11/24  - LUE duplex 11/27 superficial thrombophlebitis, neg DVT    ID:   - Transplant ppx fluconazole, therapeutic valcyte, mepron  - immunosuppression w/ steroids, cyclosporine  - Blood cx 11/20 NGTD, 11/22 NGTD, 11/24 NGTD  - BAL 11/24 - candida glabrata (BAL fungitell >500)   - Serum fungitell neg  - Procal 0.21 -> 0.18 --> 0.32  - repeat CMV next week per ID  -f/u whether to restart jeniffer?    Endo:   - Monitor glucose   - Hold NPH 14u q6 while NPO   - moderate ISS  - post-transplant steroid taper     Lines:   - L radial A line (11/24 - )  - midline LUE (11/19 - )  - PIVs  - NGT  - JPs x1    Dispo: SICU

## 2024-12-01 NOTE — PROGRESS NOTE ADULT - ASSESSMENT
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 keep Meropenem/Fluc for now given colonic ileus, cultures clear  - 11/25 CT head neg, CT CAP - R lower, L upper lobe opacities  - 11/29 CTH neg, CTAP patent vessels, distended colon -> Rectal tube placed 11/29, continue, and S/p Realistor   - hold tube feeds, keep NGT for now  - Strict I&Os   - ASA/SQH  - free water flushes for hypernatremia    [ ] Immunosuppression  - Cyclo per level, MMF HELD, pred 20  - SIMULECT completed  - Off tacro  - PPx: Valcyte 900---treating as high risk/Mepron/PPI/OsCal/Fluc    [ ] AMS   -Reintubated 11/20 Aspiration/hypoxia, extubated 11/24->[id number]--> extubated 11/26  -Per family PT was on higher dose Xanax PO, Transplant Psych following  -EEG 11/20: Mild diffuse cerebral dysfunction is nonspecific in etiology. No epileptiform abnormalities or seizures.  -neurology following  -off tacro, now on cyclo  -on keppra  -Psych recs 911/27): Ativan 0.5mg PO TID PRN for acute anxiety alleviation if refractory to Seroquel, -Combine Seroquel  50mg PO HS if QTC < 500 for ICU delirium, consider Seroquel 12.5mg PO TID PRN for acute anxiety alleviation if QTC < 500    -11/29 EEG neg seizures  -would ease off on additional ativan/narcotics to improve mental status    [ ] HTN/ pAFib  - BB as needed  - EP following, ongoing discussions about AYDEN/Cardioversions/full A/C  - Dr. 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 completed Meropenem for presumed aspiration PNA.   - recurrent colonic ileus likely 2/2 opioid/benzo use s/p Neostigmine and Relistor challenges  - 11/29 CTAP:  distended colon again -> Rectal tube placed 11/29 + Relistor with response.  Continue tube for today  - trickle feeds trial today  - Strict I&Os: UO, keep ALYSSA#2 for now  - ASA/SQH  - increase free water flushes for hypernatremia  - PT/OT/spirometry while awake    [ ] Immunosuppression  - Cyclo per level, MMF HELD, pred 20  - SIMULECT completed  - Off FK  - PPx: Valcyte 900---treating as high risk/Mepron/PPI/OsCal/Fluc    [ ] AMS  -improving today  -Reintubated 11/20 Aspiration/hypoxia, extubated 11/24->[id number]--> extubated 11/26  -Transplant Psych following.  Now on Ativan prn only  -EEG 11/30 negative, Neurology following  -off tacro, now on cyclo  -on keppra  -11/29 EEG neg seizures  -would ease off on additional ativan/narcotics to improve mental status    [ ] HTN/ pAFib  - BB as needed  - EP following, ongoing discussions about AYDEN/Cardioversions/full A/C  - Dr. Quintanilla following  - watch Digoxin levels    [ ] DM  -Lantus/Lispro, adjust prn

## 2024-12-01 NOTE — PROGRESS NOTE ADULT - SUBJECTIVE AND OBJECTIVE BOX
HISTORY  74y Male    24 HOUR EVENTS:  -Relistor 12mg x1  -Red rubber in place, coude now  -Cyclosporine PO  -IVF       SUBJECTIVE/ROS:  [ ] A ten-point review of systems was otherwise negative except as noted.  [ ] Due to altered mental status/intubation, subjective information were not able to be obtained from the patient. History was obtained, to the extent possible, from review of the chart and collateral sources of information.      NEURO  Exam: awake, alert, oriented  Meds: acetaminophen   IVPB .. 500 milliGRAM(s) IV Intermittent every 6 hours PRN Mild Pain (1 - 3)  levETIRAcetam  IVPB 250 milliGRAM(s) IV Intermittent every 12 hours  LORazepam   Injectable 0.5 milliGRAM(s) IV Push every 8 hours PRN Agitation    [x] Adequacy of sedation and pain control has been assessed and adjusted      RESPIRATORY  RR: 20 (12-01-24 @ 00:00) (13 - 32)  SpO2: 98% (12-01-24 @ 00:00) (95% - 99%)  Wt(kg): --  Exam: unlabored, clear to auscultation bilaterally  Mechanical Ventilation:     [N/A] Extubation Readiness Assessed  Meds: buDESOnide    Inhalation Suspension 0.5 milliGRAM(s) Inhalation two times a day        CARDIOVASCULAR  HR: 84 (12-01-24 @ 00:00) (84 - 114)  BP: 137/67 (12-01-24 @ 00:00) (111/60 - 149/74)  BP(mean): 95 (12-01-24 @ 00:00) (81 - 103)  ABP: --  ABP(mean): --  Wt(kg): --  CVP(cm H2O): --  VBG - ( 29 Nov 2024 14:03 )  pH: 7.39  /  pCO2: 36    /  pO2: 53    / HCO3: 22    / Base Excess: -2.7  /  SaO2: 83.3   Lactate: 1.3                Exam: regular rate and rhythm  Cardiac Rhythm: sinus  Perfusion     [x]Adequate   [ ]Inadequate  Mentation   [x]Normal       [ ]Reduced  Extremities  [x]Warm         [ ]Cool  Volume Status [ ]Hypervolemic [x]Euvolemic [ ]Hypovolemic  Meds: digoxin  Injectable 125 MICROGram(s) IV Push daily  metoprolol tartrate Injectable 5 milliGRAM(s) IV Push every 4 hours        GI/NUTRITION  Exam: soft, nontender, nondistended, incision C/D/I  Diet:  Meds: pantoprazole  Injectable 40 milliGRAM(s) IV Push daily      GENITOURINARY  I&O's Detail    11-29 @ 07:01 - 11-30 @ 07:00  --------------------------------------------------------  IN:    Enteral Tube Flush: 240 mL    Free Water: 250 mL    Glucerna 1.5: 270 mL    IV PiggyBack: 333.3 mL    IV PiggyBack: 42 mL    Sodium Chloride 0.9% Bolus: 500 mL  Total IN: 1635.3 mL    OUT:    Bulb (mL): 885 mL    Intermittent Catheterization - Urethral (mL): 575 mL    Nasogastric/Oral tube (mL): 75 mL    Oral Fluid: 0 mL    Rectal Tube (mL): 225 mL    Voided (mL): 400 mL  Total OUT: 2160 mL    Total NET: -524.7 mL      11-30 @ 07:01 - 12-01 @ 00:59  --------------------------------------------------------  IN:    Enteral Tube Flush: 30 mL    IV PiggyBack: 21 mL    IV PiggyBack: 683.2 mL    sodium chloride 0.45%: 1050 mL  Total IN: 1784.2 mL    OUT:    Bulb (mL): 480 mL    Nasogastric/Oral tube (mL): 240 mL    Rectal Tube (mL): 450 mL    Voided (mL): 250 mL  Total OUT: 1420 mL    Total NET: 364.2 mL          11-30    149[H]  |  116[H]  |  52[H]  ----------------------------<  175[H]  3.5   |  21[L]  |  1.09    Ca    7.0[L]      30 Nov 2024 15:10  Phos  2.8     11-30  Mg     2.4     11-30    TPro  3.8[L]  /  Alb  2.2[L]  /  TBili  0.7  /  DBili  x   /  AST  25  /  ALT  49[H]  /  AlkPhos  215[H]  11-30    [ ] Castro catheter, indication: N/A  Meds: sodium chloride 0.45%. 1000 milliLiter(s) IV Continuous <Continuous>        HEMATOLOGIC  Meds: aspirin  chewable 81 milliGRAM(s) Enteral Tube daily  heparin   Injectable 5000 Unit(s) SubCutaneous every 12 hours    [x] VTE Prophylaxis                        10.4   18.73 )-----------( 203      ( 30 Nov 2024 06:10 )             34.5     PT/INR - ( 30 Nov 2024 06:10 )   PT: 14.3 sec;   INR: 1.25 ratio         PTT - ( 30 Nov 2024 06:10 )  PTT:27.1 sec      INFECTIOUS DISEASES  WBC Count: 18.73 K/uL (11-30 @ 06:10)    RECENT CULTURES:  Specimen Source: .Blood BLOOD  Date/Time: 11-26 @ 06:25  Culture Results:   No growth at 4 days  Gram Stain: --  Organism: --  Specimen Source: .Blood BLOOD  Date/Time: 11-26 @ 04:50  Culture Results:   No growth at 4 days  Gram Stain: --  Organism: --    Meds: atovaquone  Suspension 1500 milliGRAM(s) Oral daily  cycloSPORINE  , modified (GENGRAF) Solution 50 milliGRAM(s) Oral <User Schedule>  fluconAZOLE IVPB 400 milliGRAM(s) IV Intermittent every 24 hours  valGANciclovir 50 mG/mL Oral Solution 900 milliGRAM(s) Oral daily        ENDOCRINE  CAPILLARY BLOOD GLUCOSE      POCT Blood Glucose.: 140 mg/dL (30 Nov 2024 23:33)  POCT Blood Glucose.: 144 mg/dL (30 Nov 2024 17:15)  POCT Blood Glucose.: 174 mg/dL (30 Nov 2024 11:17)  POCT Blood Glucose.: 185 mg/dL (30 Nov 2024 06:08)    Meds: insulin lispro (ADMELOG) corrective regimen sliding scale   SubCutaneous every 6 hours  insulin NPH human recombinant 8 Unit(s) SubCutaneous every 6 hours  methylPREDNISolone sodium succinate Injectable 16 milliGRAM(s) IV Push daily        CODE STATUS:

## 2024-12-01 NOTE — PROGRESS NOTE ADULT - SUBJECTIVE AND OBJECTIVE BOX
Transplant Surgery - Multidisciplinary Progress Note   --------------------------------------------------------------  OLT   11/15/2024        POD#16     Present: Patient seen and examined with multidisciplinary Transplant team including Surgeon: Dr. James, Dr. Sorto, JAZZ Fisher, Hepatologist: Dr. Luis, SICU team in AM rounds. Disciplines not in attendance will be notified of the plan.     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    Interval Events:  - afebrile, vss  - AXR + ileus   - rectal tube exchanged for 20F coude   - s/p realistor x 1        Immunosuppression:  -Cyclo per level, MMF HELD, pred 20  -ongoing monitoring for signs of rejection  Potential Discharge date: Pending clinical course       Tx data here     Review of systems  All other systems were reviewed and are negative, except as noted.      PHYSICAL EXAM:  Constitutional: NAD  Eyes:  PERRLA  ENMT: nc/at, no thrush, NGT  Neck: supple   Respiratory: CTA B/L  Cardiovascular: RRR  Gastrointestinal: +Distended abdomen though improving, appropriate incisional TTP. chevron incision c/d/i, staples in place. No signs of infection.  ALYSSA#2ss  Genitourinary: Voiding via nichols, +rectal tube  Extremities: SCD's in place and working bilaterally  Neurological: A&O x1, extubated  Skin: no rashes, ulcerations, lesions   Transplant Surgery - Multidisciplinary Progress Note   --------------------------------------------------------------  OLT   11/15/2024        POD#16     Present: Patient seen and examined with multidisciplinary Transplant team including Surgeon: Dr. James, Dr. Sorto, JAZZ Fisher, Hepatologist: Dr. Luis, SICU team in AM rounds. Disciplines not in attendance will be notified of the plan.     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    ·	Reintubated 11/20, extubated 11/24->reintubated 11/24, extubated 11/26 (aspiration PNA, steroid psychosis)    Interval Events:  -Afebrile, VSS off ABX and stable on BB  -good graft function  -colonic ileus improving after rectal tube and Relistor challenge, +rectal tube output  -Mental status with much improvement this morning, minimal narcotics/Ativan o/n      Immunosuppression:  -Cyclo per level, MMF HELD, pred 20  -ongoing monitoring for signs of rejection  Potential Discharge date: Pending clinical course         MEDICATIONS  (STANDING):  aspirin  chewable 81 milliGRAM(s) Enteral Tube daily  atovaquone  Suspension 1500 milliGRAM(s) Oral daily  buDESOnide    Inhalation Suspension 0.5 milliGRAM(s) Inhalation two times a day  chlorhexidine 2% Cloths 1 Application(s) Topical <User Schedule>  cycloSPORINE  , modified (GENGRAF) Solution 50 milliGRAM(s) Oral <User Schedule>  digoxin  Injectable 125 MICROGram(s) IV Push daily  fluconAZOLE IVPB 400 milliGRAM(s) IV Intermittent every 24 hours  heparin   Injectable 5000 Unit(s) SubCutaneous every 12 hours  insulin lispro (ADMELOG) corrective regimen sliding scale   SubCutaneous every 6 hours  insulin NPH human recombinant 8 Unit(s) SubCutaneous every 6 hours  levETIRAcetam  IVPB 250 milliGRAM(s) IV Intermittent every 12 hours  lidocaine   4% Patch 1 Patch Transdermal every 24 hours  methylnaltrexone Injectable 12 milliGRAM(s) SubCutaneous once  methylPREDNISolone sodium succinate Injectable 16 milliGRAM(s) IV Push daily  metoprolol tartrate 25 milliGRAM(s) Oral every 8 hours  metoprolol tartrate Injectable 5 milliGRAM(s) IV Push every 4 hours  pantoprazole  Injectable 40 milliGRAM(s) IV Push daily  sodium chloride 0.225% 1000 milliLiter(s) (100 mL/Hr) IV Continuous <Continuous>  sodium chloride 0.45%. 1000 milliLiter(s) (75 mL/Hr) IV Continuous <Continuous>  valGANciclovir 50 mG/mL Oral Solution 900 milliGRAM(s) Oral daily    MEDICATIONS  (PRN):  acetaminophen   IVPB .. 500 milliGRAM(s) IV Intermittent every 6 hours PRN Mild Pain (1 - 3)  bacitracin   Ointment 1 Application(s) Topical two times a day PRN abrasions  LORazepam   Injectable 0.5 milliGRAM(s) IV Push every 8 hours PRN Agitation      PAST MEDICAL & SURGICAL HISTORY:  Diabetes      Transaminitis      Paroxysmal atrial fibrillation      Depression      BPH (benign prostatic hyperplasia)      Hypertension      Chronic atrial fibrillation      Coronary artery disease      Hepatocellular carcinoma      DM (diabetes mellitus)      HTN (hypertension)      Paroxysmal atrial fibrillation      Cirrhosis      HCC (hepatocellular carcinoma)      History of BPH      History of laparoscopic cholecystectomy      History of lumbar laminectomy      H/O prior ablation treatment      H/O percutaneous left heart catheterization          Vital Signs Last 24 Hrs  T(C): 36.4 (01 Dec 2024 07:00), Max: 36.9 (30 Nov 2024 15:00)  T(F): 97.6 (01 Dec 2024 07:00), Max: 98.5 (30 Nov 2024 15:00)  HR: 98 (01 Dec 2024 11:00) (84 - 108)  BP: 134/60 (01 Dec 2024 11:00) (118/58 - 149/74)  BP(mean): 87 (01 Dec 2024 11:00) (84 - 103)  RR: 21 (01 Dec 2024 11:00) (14 - 26)  SpO2: 99% (01 Dec 2024 11:00) (96% - 100%)    Parameters below as of 01 Dec 2024 07:00  Patient On (Oxygen Delivery Method): room air        I&O's Summary    30 Nov 2024 07:01  -  01 Dec 2024 07:00  --------------------------------------------------------  IN: 2409.2 mL / OUT: 2020 mL / NET: 389.2 mL    01 Dec 2024 07:01  -  01 Dec 2024 12:00  --------------------------------------------------------  IN: 560 mL / OUT: 650 mL / NET: -90 mL                              10.1   17.10 )-----------( 218      ( 01 Dec 2024 05:22 )             34.2     12-01    150[H]  |  116[H]  |  51[H]  ----------------------------<  182[H]  3.7   |  20[L]  |  1.06    Ca    7.2[L]      01 Dec 2024 05:23  Phos  3.5     12-01  Mg     2.4     12-01    TPro  3.9[L]  /  Alb  2.3[L]  /  TBili  0.6  /  DBili  x   /  AST  19  /  ALT  39  /  AlkPhos  177[H]  12-01          Culture - Blood (collected 11-26-24 @ 06:25)  Source: .Blood BLOOD  Final Report (12-01-24 @ 12:00):    No growth at 5 days    Culture - Blood (collected 11-26-24 @ 04:50)  Source: .Blood BLOOD  Final Report (12-01-24 @ 12:00):    No growth at 5 days    Culture - Fungal, Bronchial (collected 11-24-24 @ 17:25)  Source: Bronchial  Preliminary Report (11-29-24 @ 14:07):    Few Candida glabrata  Organism: Candida (Torulopsis) glabrata (11-29-24 @ 14:06)  Organism: Candida (Torulopsis) glabrata (11-29-24 @ 14:06)                Review of systems  All other systems were reviewed and are negative, except as noted.      PHYSICAL EXAM:  Constitutional: NAD  Eyes:  PERRLA  ENMT: nc/at, no thrush, NGT  Neck: supple   Respiratory: CTA B/L  Cardiovascular: RRR  Gastrointestinal: softly distended--improving, appropriate incisional TTP. chevron incision c/d/i, staples in place. No signs of infection.  ALYSSA#2ss  Genitourinary: Voiding, +rectal tube, +output  Extremities: SCD's in place and working bilaterally  Neurological: A&O x3  Skin: no rashes, ulcerations, lesions

## 2024-12-01 NOTE — EEG REPORT - NS EEG TEXT BOX
REPORT OF CONTINUOUS VIDEO EEG      [facility]: [address], [place] [address], Phone: [phone]  [facility]: [address] [place] [address], Phone: [phone]  [facility]: [address] [place] [address], Phone: [phone]    Patient Name: Ramón Davison   Age: 74 year, : 1950  [place]ress: [place] [id number]      Study Date: 2024	Start Time: 08:00    End Date:  2024	End Time: 17:58  Study Duration: 9 hr  57 min    Study Information:    -------------------------------------------------------------------------------------------------------  EEG Recording Technique:  The patient underwent continuous Video-EEG monitoring, using Telemetry System hardware on the XLTek Digital System. EEG and video data were stored on a computer hard drive with important events saved in digital archive files. The material was reviewed by a physician (electroencephalographer / epileptologist) on a daily basis. Amando and seizure detection algorithms were utilized and reviewed. An EEG Technician attended to the patient, and was available throughout daytime work hours.  The epilepsy center neurologist was available in person or on call 24-hours per day.    EEG Placement and Labeling of Electrodes:  The EEG was performed utilizing 20 channel referential EEG connections (coronal over temporal over parasagittal montage) using all standard 10-20 electrode placements with EKG, with additional electrodes placed in the inferior temporal region using the modified 10-10 montage electrode placements for elective admissions, or if deemed necessary. Recording was at a sampling rate of 256 samples per second per channel. Time synchronized digital video recording was done simultaneously with EEG recording. A low light infrared camera was used for low light recording.     -------------------------------------------------------------------------------------------------------  History: -  HPI:  73 year old Male with AMS is here for evaluation      Medication  No Data.    -------------------------------------------------------------------------------------------------------  Interpretation:    [[[Abbreviation Key:  PDR=alpha rhythm/posterior dominant rhythm. A-P=anterior posterior.  Amplitude: ‘very low’:<20; ‘low’:20-49; ‘medium’:; ‘high’:>150uV.  Persistence for periodic/rhythmic patterns (% of epoch) ‘rare’:<1%; ‘occasional’:1-10%; ‘frequent’:10-50%; ‘abundant’:50-90%; ‘continuous’:>90%.  Persistence for sporadic discharges: ‘rare’:<1/hr; ‘occasional’:1/min-1/hr; ‘frequent’:>1/min; ‘abundant’:>1/10 sec.  RPP=rhythmic and periodic patterns; GRDA=generalized rhythmic delta activity; FIRDA=frontal intermittent GRDA; LRDA=lateralized rhythmic delta activity; TIRDA=temporal intermittent rhythmic delta activity;  LPD=PLED=lateralized periodic discharges; GPD=generalized periodic discharges; BIPDs =bilateral independent periodic discharges; Mf=multifocal; SIRPDs=stimulus induced rhythmic, periodic, or ictal appearing discharges; BIRDs=brief potentially ictal rhythmic discharges >4 Hz, lasting .5-10s; PFA (paroxysmal bursts >13 Hz or =8 Hz <10s).  Modifiers: +F=with fast component; +S=with spike component; +R=with rhythmic component.  S-B=burst suppression pattern.  Max=maximal. N1-drowsy; N2-stage II sleep; N3-slow wave sleep. SSS/BETS=small sharp spikes/benign epileptiform transients of sleep. HV=hyperventilation; PS=photic stimulation]]]    Daily EEG Visual Analysis    FINDINGS:      Background:  Symmetry: symmetric  Continuous: continuous  PDR: symmetric, poorly-modulated 7 Hz activity, with amplitude to 40 uV, that attenuated to eye opening.  Low amplitude frontal beta noted in wakefulness.  Reactivity: present  Voltage: normal, [defined typically between 20-150uV]  Anterior Posterior Gradient: absent  Breach: absent    Background Slowing:  Generalized slowing: present. as above  Focal slowing: none was present.    State Changes:   Drowsiness was characterized by fragmentation, attenuation, and slowing of the background activity.      Stage II sleep transients did not occur.      Sporadic Epileptiform Discharges:   None    Rhythmic and Periodic Patterns (RPPs):  None     Electrographic and Electroclinical seizures:  None    Other Clinical Events:  None    Activation Procedures:   -Hyperventilation was not performed.    -Photic stimulation was not performed.      Artifacts:  Intermittent myogenic and movement artifacts were noted.    ECG:  No significant abnormality    -------------------------------------------------------------------------------------------------------  EEG Classification:    Abnormal EEG in awake / drowsy states.  1.	Mild diffuse background slowing    -------------------------------------------------------------------------------------------------------  EEG Impression / Clinical Correlate:  Abnormal prolonged EEG study due to Mild diffuse cerebral dysfunction that is not specific in etiology    No epileptic discharges recorded.  No seizures recorded.    -------------------------------------------------------------------------------------------------------  TANNER Nova  Attending Physician, [facility] [place]    ------------------------------------  EEG Reading Room: [phone]  On Call Service After Hours: [phone]                    
     REPORT OF CONTINUOUS VIDEO EEG      [facility]: [address] [facility] [address], [place] [address], Phone: [phone]  [facility]: [address] [place] [address], Phone: [phone]  [facility]: [address] [place] [address], Phone: [phone]    Patient Name: Ramón Davison   Age: 74 year, : 1950  Childress: [place] [id number]      Study Date: 2024	Start Time: 08:00    End Date:  2024	End Time: 08:00   Study Duration: 24 hr      Study Information:    -------------------------------------------------------------------------------------------------------  EEG Recording Technique:  The patient underwent continuous Video-EEG monitoring, using Telemetry System hardware on the XLTek Digital System. EEG and video data were stored on a computer hard drive with important events saved in digital archive files. The material was reviewed by a physician (electroencephalographer / epileptologist) on a daily basis. Amando and seizure detection algorithms were utilized and reviewed. An EEG Technician attended to the patient, and was available throughout daytime work hours.  The epilepsy center neurologist was available in person or on call 24-hours per day.    EEG Placement and Labeling of Electrodes:  The EEG was performed utilizing 20 channel referential EEG connections (coronal over temporal over parasagittal montage) using all standard 10-20 electrode placements with EKG, with additional electrodes placed in the inferior temporal region using the modified 10-10 montage electrode placements for elective admissions, or if deemed necessary. Recording was at a sampling rate of 256 samples per second per channel. Time synchronized digital video recording was done simultaneously with EEG recording. A low light infrared camera was used for low light recording.     -------------------------------------------------------------------------------------------------------  History: -  HPI:  73 year old Male with AMS is here for evaluation      Medication  No Data.    -------------------------------------------------------------------------------------------------------  Interpretation:    [[[Abbreviation Key:  PDR=alpha rhythm/posterior dominant rhythm. A-P=anterior posterior.  Amplitude: ‘very low’:<20; ‘low’:20-49; ‘medium’:; ‘high’:>150uV.  Persistence for periodic/rhythmic patterns (% of epoch) ‘rare’:<1%; ‘occasional’:1-10%; ‘frequent’:10-50%; ‘abundant’:50-90%; ‘continuous’:>90%.  Persistence for sporadic discharges: ‘rare’:<1/hr; ‘occasional’:1/min-1/hr; ‘frequent’:>1/min; ‘abundant’:>1/10 sec.  RPP=rhythmic and periodic patterns; GRDA=generalized rhythmic delta activity; FIRDA=frontal intermittent GRDA; LRDA=lateralized rhythmic delta activity; TIRDA=temporal intermittent rhythmic delta activity;  LPD=PLED=lateralized periodic discharges; GPD=generalized periodic discharges; BIPDs =bilateral independent periodic discharges; Mf=multifocal; SIRPDs=stimulus induced rhythmic, periodic, or ictal appearing discharges; BIRDs=brief potentially ictal rhythmic discharges >4 Hz, lasting .5-10s; PFA (paroxysmal bursts >13 Hz or =8 Hz <10s).  Modifiers: +F=with fast component; +S=with spike component; +R=with rhythmic component.  S-B=burst suppression pattern.  Max=maximal. N1-drowsy; N2-stage II sleep; N3-slow wave sleep. SSS/BETS=small sharp spikes/benign epileptiform transients of sleep. HV=hyperventilation; PS=photic stimulation]]]    Daily EEG Visual Analysis    FINDINGS:      Background:  Symmetry: symmetric  Continuous: continuous  PDR: symmetric, poorly-modulated 7 Hz activity, with amplitude to 40 uV, that attenuated to eye opening.  Low amplitude frontal beta noted in wakefulness.  Reactivity: present  Voltage: normal, [defined typically between 20-150uV]  Anterior Posterior Gradient: absent  Breach: absent    Background Slowing:  Generalized slowing: present. as above  Focal slowing: none was present.    State Changes:   Drowsiness was characterized by fragmentation, attenuation, and slowing of the background activity.      Stage II sleep transients did not occur.      Sporadic Epileptiform Discharges:   None    Rhythmic and Periodic Patterns (RPPs):  None     Electrographic and Electroclinical seizures:  None    Other Clinical Events:  None    Activation Procedures:   -Hyperventilation was not performed.    -Photic stimulation was not performed.      Artifacts:  Intermittent myogenic and movement artifacts were noted.    ECG:  No significant abnormality    -------------------------------------------------------------------------------------------------------  EEG Classification:    Abnormal EEG in awake / drowsy states.  1.	Mild diffuse background slowing    -------------------------------------------------------------------------------------------------------  EEG Impression / Clinical Correlate:  Abnormal prolonged EEG study due to Mild diffuse cerebral dysfunction that is not specific in etiology    No epileptic discharges recorded.  No seizures recorded.    -------------------------------------------------------------------------------------------------------  TANNER Nova  Attending Physician, [facility] [place]    ------------------------------------  EEG Reading Room: [phone]  On Call Service After Hours: [phone]                  
   REPORT OF CONTINUOUS VIDEO EEG      [facility]: [address], [place] [address], Phone: [phone]  [facility]: [address] [place] [address], Phone: [phone]  [facility]: [address] [place] [address], Phone: [phone]    Patient Name: Ramón Davison   Age: 74 year, : 1950  [place]: [place] [id number]      Study Date: 2024	Start Time: 5:52:36 AM      End Date:  2024	End Time: 08:00:03 AM     Study Duration: 1 hr  20 min    Study Information:    -------------------------------------------------------------------------------------------------------  EEG Recording Technique:  The patient underwent continuous Video-EEG monitoring, using Telemetry System hardware on the XLTek Digital System. EEG and video data were stored on a computer hard drive with important events saved in digital archive files. The material was reviewed by a physician (electroencephalographer / epileptologist) on a daily basis. Amando and seizure detection algorithms were utilized and reviewed. An EEG Technician attended to the patient, and was available throughout daytime work hours.  The epilepsy center neurologist was available in person or on call 24-hours per day.    EEG Placement and Labeling of Electrodes:  The EEG was performed utilizing 20 channel referential EEG connections (coronal over temporal over parasagittal montage) using all standard 10-20 electrode placements with EKG, with additional electrodes placed in the inferior temporal region using the modified 10-10 montage electrode placements for elective admissions, or if deemed necessary. Recording was at a sampling rate of 256 samples per second per channel. Time synchronized digital video recording was done simultaneously with EEG recording. A low light infrared camera was used for low light recording.     -------------------------------------------------------------------------------------------------------  History: -  HPI:  73 year old Male with AMS is here for evaluation      Medication  No Data.    -------------------------------------------------------------------------------------------------------  Interpretation:    [[[Abbreviation Key:  PDR=alpha rhythm/posterior dominant rhythm. A-P=anterior posterior.  Amplitude: ‘very low’:<20; ‘low’:20-49; ‘medium’:; ‘high’:>150uV.  Persistence for periodic/rhythmic patterns (% of epoch) ‘rare’:<1%; ‘occasional’:1-10%; ‘frequent’:10-50%; ‘abundant’:50-90%; ‘continuous’:>90%.  Persistence for sporadic discharges: ‘rare’:<1/hr; ‘occasional’:1/min-1/hr; ‘frequent’:>1/min; ‘abundant’:>1/10 sec.  RPP=rhythmic and periodic patterns; GRDA=generalized rhythmic delta activity; FIRDA=frontal intermittent GRDA; LRDA=lateralized rhythmic delta activity; TIRDA=temporal intermittent rhythmic delta activity;  LPD=PLED=lateralized periodic discharges; GPD=generalized periodic discharges; BIPDs =bilateral independent periodic discharges; Mf=multifocal; SIRPDs=stimulus induced rhythmic, periodic, or ictal appearing discharges; BIRDs=brief potentially ictal rhythmic discharges >4 Hz, lasting .5-10s; PFA (paroxysmal bursts >13 Hz or =8 Hz <10s).  Modifiers: +F=with fast component; +S=with spike component; +R=with rhythmic component.  S-B=burst suppression pattern.  Max=maximal. N1-drowsy; N2-stage II sleep; N3-slow wave sleep. SSS/BETS=small sharp spikes/benign epileptiform transients of sleep. HV=hyperventilation; PS=photic stimulation]]]    Daily EEG Visual Analysis    FINDINGS:      Background:  Symmetry: symmetric  Continuous: continuous  PDR: symmetric, poorly-modulated 7 Hz activity, with amplitude to 40 uV, that attenuated to eye opening.  Low amplitude frontal beta noted in wakefulness.  Reactivity: present  Voltage: normal, [defined typically between 20-150uV]  Anterior Posterior Gradient: absent  Breach: absent    Background Slowing:  Generalized slowing: present. as above  Focal slowing: none was present.    State Changes:   Drowsiness was characterized by fragmentation, attenuation, and slowing of the background activity.      Stage II sleep transients did not occur.      Sporadic Epileptiform Discharges:   None    Rhythmic and Periodic Patterns (RPPs):  None     Electrographic and Electroclinical seizures:  None    Other Clinical Events:  None    Activation Procedures:   -Hyperventilation was not performed.    -Photic stimulation was not performed.      Artifacts:  Intermittent myogenic and movement artifacts were noted.    ECG:  No significant abnormality    -------------------------------------------------------------------------------------------------------  EEG Classification:    Abnormal EEG in awake / drowsy states.  1.	Mild diffuse background slowing    -------------------------------------------------------------------------------------------------------  EEG Impression / Clinical Correlate:  Abnormal prolonged EEG study due to Mild diffuse cerebral dysfunction that is not specific in etiology    No epileptic discharges recorded.  No seizures recorded.    -------------------------------------------------------------------------------------------------------  TANNER Nova  Attending Physician, [facility] [place]    ------------------------------------  EEG Reading Room: [phone]  On Call Service After Hours: [phone]        
STERLING BRYANT [facility]-[id number]     Study Date: 11/20/24 17:15 - 11/21/24 08:00  Duration: 14 hr 31 min  --------------------------------------------------------------------------------------------------  History:  CC/ HPI Patient is a 74y old  Male who presents with a chief complaint of Liver Transplant (21 Nov 2024 08:25)    MEDICATIONS  (STANDING):  albuterol/ipratropium for Nebulization 3 milliLiter(s) Nebulizer every 6 hours  aspirin Suppository 300 milliGRAM(s) Rectal daily  buDESOnide    Inhalation Suspension 0.5 milliGRAM(s) Inhalation two times a day  calcium carbonate 1250 mG  + Vitamin D (OsCal 500 + D) 1 Tablet(s) Oral two times a day  chlorhexidine 0.12% Liquid 15 milliLiter(s) Oral Mucosa every 12 hours  chlorhexidine 2% Cloths 1 Application(s) Topical <User Schedule>  fluconAZOLE IVPB 400 milliGRAM(s) IV Intermittent every 24 hours  heparin   Injectable 5000 Unit(s) SubCutaneous every 12 hours  insulin glargine Injectable (LANTUS) 16 Unit(s) SubCutaneous at bedtime  insulin lispro (ADMELOG) corrective regimen sliding scale   SubCutaneous every 6 hours  levETIRAcetam  IVPB 500 milliGRAM(s) IV Intermittent every 12 hours  LORazepam   Injectable 1 milliGRAM(s) IV Push every 8 hours  metoprolol tartrate Injectable 5 milliGRAM(s) IV Push every 6 hours  mycophenolate mofetil IVPB 1000 milliGRAM(s) IV Intermittent <User Schedule>  naloxegol 12.5 milliGRAM(s) Oral <User Schedule>  pantoprazole  Injectable 40 milliGRAM(s) IV Push daily  piperacillin/tazobactam IVPB.. 3.375 Gram(s) IV Intermittent every 8 hours  predniSONE   Tablet 20 milliGRAM(s) Oral daily  propofol Infusion 30 MICROgram(s)/kG/Min (16.8 mL/Hr) IV Continuous <Continuous>  sodium chloride 0.9%. 1000 milliLiter(s) (30 mL/Hr) IV Continuous <Continuous>  sodium chloride 3%  Inhalation 4 milliLiter(s) Inhalation every 6 hours  trimethoprim   80 mG/sulfamethoxazole 400 mG 1 Tablet(s) Oral daily  ursodiol Suspension 300 milliGRAM(s) Oral two times a day  valGANciclovir 450 milliGRAM(s) Oral daily    --------------------------------------------------------------------------------------------------  Study Interpretation:    [[[Abbreviation Key:  PDR=alpha rhythm/posterior dominant rhythm. A-P=anterior posterior.  Amplitude: ‘very low’:<20; ‘low’:20-49; ‘medium’:; ‘high’:>150uV.  Persistence for periodic/rhythmic patterns (% of epoch) ‘rare’:<1%; ‘occasional’:1-10%; ‘frequent’:10-50%; ‘abundant’:50-90%; ‘continuous’:>90%.  Persistence for sporadic discharges: ‘rare’:<1/hr; ‘occasional’:1/min-1/hr; ‘frequent’:>1/min; ‘abundant’:>1/10 sec.  RPP=rhythmic and periodic patterns; GRDA=generalized rhythmic delta activity; FIRDA=frontal intermittent GRDA; LRDA=lateralized rhythmic delta activity; TIRDA=temporal intermittent rhythmic delta activity;  LPD=PLED=lateralized periodic discharges; GPD=generalized periodic discharges; BIPDs =bilateral independent periodic discharges; Mf=multifocal; SIRPDs=stimulus induced rhythmic, periodic, or ictal appearing discharges; BIRDs=brief potentially ictal rhythmic discharges >4 Hz, lasting .5-10s; PFA (paroxysmal bursts >13 Hz or =8 Hz <10s).  Modifiers: +F=with fast component; +S=with spike component; +R=with rhythmic component.  S-B=burst suppression pattern.  Max=maximal. N1-drowsy; N2-stage II sleep; N3-slow wave sleep. SSS/BETS=small sharp spikes/benign epileptiform transients of sleep. HV=hyperventilation; PS=photic stimulation]]]    Daily EEG Visual Analysis    FINDINGS:      Background:  Continuity: Continuous  Symmetry: Symmetric  Posterior dominant rhythm (PDR): 7.5-8 Hz, reactive to eye closure. Symmetric low-amplitude frontal beta in wakefulness.  Voltage: Normal  Anterior-Posterior Gradient: Present  Other background findings: None  Breach: Absent    Background Slowing:  Generalized slowing: As above  Focal slowing: None    State Changes:   Drowsiness is characterized by slowing of the background activity.  Stage 2 sleep is characterized by symmetric sleep spindles, at times asynchronous.    Interictal Findings:  None    Electrographic and Electroclinical seizures:  None    Other Clinical Events:  None    Activation Procedures:   Hyperventilation is not performed.    Photic stimulation is not performed.    Artifacts:  Intermittent myogenic and movement artifacts are present.    Single-lead EKG: Irregularly irregular rhythm. Occasional PVCs.    EEG Classification / Summary:  Abnormal EEG in the awake, drowsy, and asleep states.   Mild diffuse slowing.  No focal or epileptiform abnormalities are captured.   No seizures.    Clinical Impression:  Mild diffuse cerebral dysfunction is nonspecific in etiology.   No epileptiform abnormalities or seizures.  Single-lead EKG: Irregularly irregular rhythm.        -------------------------------------------------------------------------------------------------------    Hamida Enciso MD  Attending Physician, [facility]    -------------------------------------------------------------------------------------------------------    To reach EEG technologist:  Please use the pager number for the appropriate hospital or contact the .  At [facility] - Pager #: [phone]    To reach EEG-reading physician:  EEG Reading Room Phone #: [phone]  Epilepsy Answering Service after 5PM and before 8:30AM: Phone #: [phone]    
    EEG REPORT:     STERLING BRYANT MRN-[id number]     Study Date: 11/21/24 08:00 - 11/21/24 16:49  Duration: 08 hr 48 min  --------------------------------------------------------------------------------------------------  History:  CC/ HPI Patient is a 74y old  Male who presents with a chief complaint of Liver Transplant (21 Nov 2024 08:25)    MEDICATIONS  (STANDING):  albuterol/ipratropium for Nebulization 3 milliLiter(s) Nebulizer every 6 hours  aspirin Suppository 300 milliGRAM(s) Rectal daily  buDESOnide    Inhalation Suspension 0.5 milliGRAM(s) Inhalation two times a day  calcium carbonate 1250 mG  + Vitamin D (OsCal 500 + D) 1 Tablet(s) Oral two times a day  chlorhexidine 0.12% Liquid 15 milliLiter(s) Oral Mucosa every 12 hours  chlorhexidine 2% Cloths 1 Application(s) Topical <User Schedule>  fluconAZOLE IVPB 400 milliGRAM(s) IV Intermittent every 24 hours  heparin   Injectable 5000 Unit(s) SubCutaneous every 12 hours  insulin glargine Injectable (LANTUS) 16 Unit(s) SubCutaneous at bedtime  insulin lispro (ADMELOG) corrective regimen sliding scale   SubCutaneous every 6 hours  levETIRAcetam  IVPB 500 milliGRAM(s) IV Intermittent every 12 hours  LORazepam   Injectable 1 milliGRAM(s) IV Push every 8 hours  metoprolol tartrate Injectable 5 milliGRAM(s) IV Push every 6 hours  mycophenolate mofetil IVPB 1000 milliGRAM(s) IV Intermittent <User Schedule>  naloxegol 12.5 milliGRAM(s) Oral <User Schedule>  pantoprazole  Injectable 40 milliGRAM(s) IV Push daily  piperacillin/tazobactam IVPB.. 3.375 Gram(s) IV Intermittent every 8 hours  predniSONE   Tablet 20 milliGRAM(s) Oral daily  propofol Infusion 30 MICROgram(s)/kG/Min (16.8 mL/Hr) IV Continuous <Continuous>  sodium chloride 0.9%. 1000 milliLiter(s) (30 mL/Hr) IV Continuous <Continuous>  sodium chloride 3%  Inhalation 4 milliLiter(s) Inhalation every 6 hours  trimethoprim   80 mG/sulfamethoxazole 400 mG 1 Tablet(s) Oral daily  ursodiol Suspension 300 milliGRAM(s) Oral two times a day  valGANciclovir 450 milliGRAM(s) Oral daily    --------------------------------------------------------------------------------------------------  Study Interpretation:    [[[Abbreviation Key:  PDR=alpha rhythm/posterior dominant rhythm. A-P=anterior posterior.  Amplitude: ‘very low’:<20; ‘low’:20-49; ‘medium’:; ‘high’:>150uV.  Persistence for periodic/rhythmic patterns (% of epoch) ‘rare’:<1%; ‘occasional’:1-10%; ‘frequent’:10-50%; ‘abundant’:50-90%; ‘continuous’:>90%.  Persistence for sporadic discharges: ‘rare’:<1/hr; ‘occasional’:1/min-1/hr; ‘frequent’:>1/min; ‘abundant’:>1/10 sec.  RPP=rhythmic and periodic patterns; GRDA=generalized rhythmic delta activity; FIRDA=frontal intermittent GRDA; LRDA=lateralized rhythmic delta activity; TIRDA=temporal intermittent rhythmic delta activity;  LPD=PLED=lateralized periodic discharges; GPD=generalized periodic discharges; BIPDs =bilateral independent periodic discharges; Mf=multifocal; SIRPDs=stimulus induced rhythmic, periodic, or ictal appearing discharges; BIRDs=brief potentially ictal rhythmic discharges >4 Hz, lasting .5-10s; PFA (paroxysmal bursts >13 Hz or =8 Hz <10s).  Modifiers: +F=with fast component; +S=with spike component; +R=with rhythmic component.  S-B=burst suppression pattern.  Max=maximal. N1-drowsy; N2-stage II sleep; N3-slow wave sleep. SSS/BETS=small sharp spikes/benign epileptiform transients of sleep. HV=hyperventilation; PS=photic stimulation]]]    Daily EEG Visual Analysis    FINDINGS:      Background:  Continuity: Continuous  Symmetry: Symmetric  Posterior dominant rhythm (PDR): 7.5-8 Hz, reactive to eye closure. Symmetric low-amplitude frontal beta in wakefulness.  Voltage: Normal  Anterior-Posterior Gradient: Present  Other background findings: None  Breach: Absent    Background Slowing:  Generalized slowing: As above  Focal slowing: None    State Changes:   Drowsiness is characterized by slowing of the background activity.  Stage 2 sleep is characterized by symmetric sleep spindles, at times asynchronous.    Interictal Findings:  None    Electrographic and Electroclinical seizures:  None    Other Clinical Events:  None    Activation Procedures:   Hyperventilation is not performed.    Photic stimulation is not performed.    Artifacts:  Intermittent myogenic and movement artifacts are present.    Single-lead EKG: Irregularly irregular rhythm. Occasional PVCs.    EEG Classification / Summary:  Abnormal EEG in the awake, drowsy, and asleep states.   Mild diffuse slowing.  No focal or epileptiform abnormalities are captured.   No seizures.    Clinical Impression:  Mild diffuse cerebral dysfunction is nonspecific in etiology.   No epileptiform abnormalities or seizures.  Single-lead EKG: Irregularly irregular rhythm.        -------------------------------------------------------------------------------------------------------    TANNER Nova  Attending Physician, [facility]    ------------------------------------  EEG Reading Room: [phone]  On Call Service After Hours: [phone]

## 2024-12-02 DIAGNOSIS — Z01.818 ENCOUNTER FOR OTHER PREPROCEDURAL EXAMINATION: ICD-10-CM

## 2024-12-02 DIAGNOSIS — Z94.4 LIVER TRANSPLANT STATUS: ICD-10-CM

## 2024-12-02 DIAGNOSIS — Z79.60 LONG TERM (CURRENT) USE OF UNSPECIFIED IMMUNOMODULATORS AND IMMUNOSUPPRESSANTS: ICD-10-CM

## 2024-12-02 RX ORDER — ATOVAQUONE 750 MG/5ML
750 SUSPENSION ORAL DAILY
Qty: 300 | Refills: 5 | Status: ACTIVE | COMMUNITY
Start: 2024-12-02 | End: 1900-01-01

## 2024-12-02 RX ORDER — PANTOPRAZOLE 40 MG/1
40 TABLET, DELAYED RELEASE ORAL DAILY
Qty: 30 | Refills: 3 | Status: ACTIVE | COMMUNITY
Start: 2024-12-02 | End: 1900-01-01

## 2024-12-02 RX ORDER — SENNOSIDES 8.6 MG TABLETS 8.6 MG/1
8.6 TABLET ORAL DAILY
Qty: 30 | Refills: 3 | Status: ACTIVE | COMMUNITY
Start: 2024-12-02 | End: 1900-01-01

## 2024-12-02 RX ORDER — VALGANCICLOVIR HYDROCHLORIDE 450 MG/1
450 TABLET ORAL DAILY
Qty: 60 | Refills: 5 | Status: ACTIVE | COMMUNITY
Start: 2024-12-02 | End: 1900-01-01

## 2024-12-02 RX ORDER — CYCLOSPORINE 50 MG/1
50 CAPSULE, LIQUID FILLED ORAL TWICE DAILY
Qty: 120 | Refills: 3 | Status: ACTIVE | COMMUNITY
Start: 2024-12-02 | End: 1900-01-01

## 2024-12-02 RX ORDER — PREDNISONE 10 MG/1
10 TABLET ORAL DAILY
Qty: 60 | Refills: 3 | Status: ACTIVE | COMMUNITY
Start: 2024-12-02 | End: 1900-01-01

## 2024-12-02 RX ORDER — CYCLOSPORINE 25 MG/1
25 CAPSULE, LIQUID FILLED ORAL TWICE DAILY
Qty: 120 | Refills: 3 | Status: ACTIVE | COMMUNITY
Start: 2024-12-02 | End: 1900-01-01

## 2024-12-02 RX ORDER — B-COMPLEX WITH VITAMIN C
500-5 TABLET ORAL
Qty: 60 | Refills: 3 | Status: ACTIVE | COMMUNITY
Start: 2024-12-02 | End: 1900-01-01

## 2024-12-02 RX ORDER — ACETAMINOPHEN 325 MG/1
325 TABLET ORAL
Qty: 240 | Refills: 3 | Status: ACTIVE | COMMUNITY
Start: 2024-12-02 | End: 1900-01-01

## 2024-12-02 RX ORDER — MYCOPHENOLATE MOFETIL 500 MG/1
500 TABLET ORAL TWICE DAILY
Qty: 120 | Refills: 5 | Status: ACTIVE | COMMUNITY
Start: 2024-12-02 | End: 1900-01-01

## 2024-12-02 RX ORDER — OMEGA-3/DHA/EPA/FISH OIL 300-1000MG
400 CAPSULE ORAL
Qty: 60 | Refills: 3 | Status: ACTIVE | COMMUNITY
Start: 2024-12-02 | End: 1900-01-01

## 2024-12-02 NOTE — BH CONSULTATION LIAISON PROGRESS NOTE - NSBHASSESSMENTFT_PSY_ALL_CORE
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 Patient seen by transplant psychiatry for concerns of anxiety post transplant. Patient on exam noted a hx of low mood, anxiety secondary to recent health problems 3 months ago, with need for liver transplant, of which he has been followed by Dr. Hobbs outpatient for management for his mood and anxiety. He noted post operatively experiencing symptoms, anxiety, with anxious ruminations, difficulty with sleep as well as  with feelings of guilt regarding organ from decreased donor.  He denied overt symptoms of panic attacks, lara, AH/Vh/HI/SI, intent or plan. Patient educated at bedside regarding medication regiment, including recommendations for both acute anxiety alleviation as well as sleep. Patient verbalized agreement and understanding with plan proposed.     11/19:Patient on exam A/Ox2, noted to be delirious, denied any AH/VH/HI/SI, intent or plan, educated about medication regiment.    11/21:Patient on exam intubated, collateral from Son at bedside, noted patient has been using Xanax more than prescribed amount on ISTOP.     11/27:Patient on exam A/Ox2-3, noted hx of 4mg of xanax use daily for the last several months, stated he has been anxious d/t physical discomfort on exam, denied any AH/VH/HI/SI, intent or plan.  11/29:Patient on exam noted difficultly sleeping, will increase Seroquel at this time to address sleep, patient denied any AH/Vh/Hi/Si.    12/2:Patient on exam noted having difficultly with sleep overnight, A/Ox3-4 on exam, educated regarding restarting Seroquel of which patient is amenable too.     Plan  -C/W Ativan 0.5mg PO TID PRN for acute anxiety alleviation, if anxiety is refractory to Seroquel use  -Start Seroquel 50mg PO HS if QTC < 500 for ICU delirium and sleep architecture   -Can consider starting Seroquel 12.5mg PO TID PRN for acute anxiety alleviation if QTC < 500   -Please HOLD: Wellbutrin, Lexapro, Abilify, Remeron given patient altered mental status   -Transplant psychiatry will continue to follow patient

## 2024-12-02 NOTE — PROGRESS NOTE ADULT - SUBJECTIVE AND OBJECTIVE BOX
Transplant Surgery - Multidisciplinary Progress Note   --------------------------------------------------------------  OLT   11/15/2024        POD#17    Present:   Patient seen and examined with multidisciplinary Transplant team including Surgeon: Dr. Dagher, Dr. Sorto,  Hepatologist: Dr. Luis, JAZZ Colby, SICU team in AM rounds.   Disciplines not in attendance will be notified of the plan.       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    ·	Reintubated 11/20, extubated 11/24->reintubated 11/24, extubated 11/26 (aspiration PNA, steroid psychosis)    Interval Events:  -Afebrile, VSS off ABX and stable on BB  -good graft function  -rectal tube came out with large BM   -tolerating trickle feeds       Immunosuppression:  -Cyclo per level, MMF HELD, pred 20  -ongoing monitoring for signs of rejection  Potential Discharge date: Pending clinical course       Tx data here     Review of systems  All other systems were reviewed and are negative, except as noted.      PHYSICAL EXAM:  Constitutional: NAD  Eyes:  PERRLA  ENMT: nc/at, no thrush, NGT  Neck: supple   Respiratory: CTA B/L  Cardiovascular: RRR  Gastrointestinal: softly distended--improving, appropriate incisional TTP. chevron incision c/d/i, staples in place. No signs of infection.   Genitourinary: Voiding, +rectal tube, +output  Extremities: SCD's in place and working bilaterally  Neurological: A&O x3  Skin: no rashes, ulcerations, lesions   Transplant Surgery - Multidisciplinary Progress Note   --------------------------------------------------------------  OLT   11/15/2024        POD#17    Present:   Patient seen and examined with multidisciplinary Transplant team including Surgeon: Dr. Dagher, Dr. Sorto,  Hepatologist: Dr. Luis, JAZZ Colby, SICU team in AM rounds.   Disciplines not in attendance will be notified of the plan.     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     s/p OLT 11/15 with post op course c/b:  ·	Hypoxia: Reintubated 11/20, extubated 11/24->reintubated 11/24, extubated 11/26 (aspiration PNA, steroid psychosis)  ·	Ileus   ·	Fever  ·	A fib  ·	AMS/Seizure     Interval Events:  - afebrile  - tolerating Tube feeds  - BM x 2 overnight   - NA better, getting free water flushes 250cc q8hrs  - MS improving   - NGT in place    Immunosuppression:  -Cyclo per level, MMF HELD, pred 20  -ongoing monitoring for signs of rejection  Potential Discharge date: Pending clinical course     MEDICATIONS  (STANDING):  apixaban 2.5 milliGRAM(s) Enteral Tube every 12 hours  aspirin  chewable 81 milliGRAM(s) Enteral Tube daily  atovaquone  Suspension 1500 milliGRAM(s) Oral daily  buDESOnide    Inhalation Suspension 0.5 milliGRAM(s) Inhalation two times a day  chlorhexidine 2% Cloths 1 Application(s) Topical <User Schedule>  cycloSPORINE  , modified (GENGRAF) Solution 75 milliGRAM(s) Oral <User Schedule>  cycloSPORINE  , modified (GENGRAF) Solution 50 milliGRAM(s) Oral <User Schedule>  digoxin  Injectable 125 MICROGram(s) IV Push daily  doxazosin 2 milliGRAM(s) Oral at bedtime  fluconAZOLE IVPB 400 milliGRAM(s) IV Intermittent every 24 hours  insulin lispro (ADMELOG) corrective regimen sliding scale   SubCutaneous every 4 hours  levETIRAcetam  IVPB 250 milliGRAM(s) IV Intermittent every 12 hours  lidocaine   4% Patch 1 Patch Transdermal every 24 hours  metoprolol tartrate 12.5 milliGRAM(s) Oral every 12 hours  mycophenolate mofetil IVPB 500 milliGRAM(s) IV Intermittent <User Schedule>  nystatin Powder 1 Application(s) Topical three times a day  OLANZapine Disintegrating Tablet 10 milliGRAM(s) Oral once  pantoprazole  Injectable 40 milliGRAM(s) IV Push daily  predniSONE  Solution 20 milliGRAM(s) Oral every 24 hours  QUEtiapine 50 milliGRAM(s) Oral at bedtime  sodium chloride 0.45%. 1000 milliLiter(s) (50 mL/Hr) IV Continuous <Continuous>  valGANciclovir 50 mG/mL Oral Solution 900 milliGRAM(s) Oral daily    MEDICATIONS  (PRN):  bacitracin   Ointment 1 Application(s) Topical two times a day PRN abrasions  LORazepam   Injectable 0.5 milliGRAM(s) IV Push every 8 hours PRN Agitation  QUEtiapine 12.5 milliGRAM(s) Oral every 8 hours PRN Anxiety      PAST MEDICAL & SURGICAL HISTORY:  Diabetes  Transaminitis  Paroxysmal atrial fibrillation  Depression  BPH (benign prostatic hyperplasia)  Hypertension  Chronic atrial fibrillation  Coronary artery disease  Hepatocellular carcinoma  DM (diabetes mellitus)  HTN (hypertension)  Paroxysmal atrial fibrillation  Cirrhosis  HCC (hepatocellular carcinoma)  History of BPH  Historyof laparoscopic cholecystectomy  History of lumbar laminectomy  H/O prior ablation treatment  H/O percutaneous left heart catheterization    Vital Signs Last 24 Hrs  T(C): 37 (02 Dec 2024 19:00), Max: 37 (02 Dec 2024 19:00)  T(F): 98.6 (02 Dec 2024 19:00), Max: 98.6 (02 Dec 2024 19:00)  HR: 92 (02 Dec 2024 21:00) (83 - 117)  BP: 127/65 (02 Dec 2024 21:00) (92/61 - 141/60)  BP(mean): 90 (02 Dec 2024 21:00) (71 - 96)  RR: 13 (02 Dec 2024 21:00) (10 - 25)  SpO2: 100% (02 Dec 2024 21:00) (96% - 100%)    Parameters below as of 02 Dec 2024 19:00  Patient On (Oxygen Delivery Method): room air    I&O's Summary    01 Dec 2024 07:01  -  02 Dec 2024 07:00  --------------------------------------------------------  IN: 4280 mL / OUT: 1225 mL / NET: 3055 mL    02 Dec 2024 07:01  -  02 Dec 2024 21:22  --------------------------------------------------------  IN: 1830 mL / OUT: 50 mL / NET: 1780 mL                        9.4    11.91 )-----------( 170      ( 02 Dec 2024 05:05 )             31.5     12-02    144  |  115[H]  |  59[H]  ----------------------------<  127[H]  3.9   |  18[L]  |  1.48[H]    Ca    7.0[L]      02 Dec 2024 18:19  Phos  3.7     12-02  Mg     2.4     12-02    TPro  3.8[L]  /  Alb  2.2[L]  /  TBili  0.6  /  DBili  x   /  AST  18  /  ALT  31  /  AlkPhos  137[H]  12-02    Culture - Stool (collected 12-01-24 @ 08:20)  Source: .Stool  Preliminary Report (12-02-24 @ 15:59):    No enteric pathogens to date: Final culture pending    Culture - Blood (collected 11-26-24 @ 06:25)  Source: .Blood BLOOD  Final Report (12-01-24 @ 12:00):    No growth at 5 days    Culture - Blood (collected 11-26-24 @ 04:50)  Source: .Blood BLOOD  Final Report (12-01-24 @ 12:00):    No growth at 5 days      Review of systems  All other systems were reviewed and are negative, except as noted.      PHYSICAL EXAM:  Constitutional: NAD  Eyes:  PERRLA  ENMT: nc/at, no thrush, NGT  Neck: supple   Respiratory: CTA B/L  Cardiovascular: RRR  Gastrointestinal: softly distended--improving, appropriate incisional TTP. chevron incision c/d/i, staples in place. No signs of infection.   Genitourinary: Voiding,   Extremities: SCD's in place and working bilaterally, + LE edema  Neurological: A&O x3  Skin: no rashes, ulcerations, lesions

## 2024-12-02 NOTE — BH CONSULTATION LIAISON PROGRESS NOTE - NSBHPSYCHOLCOGABN_PSY_A_CORE
disoriented to place/disoriented to situation
disoriented to time
disoriented to time
disoriented to time/disoriented to situation

## 2024-12-02 NOTE — PROGRESS NOTE ADULT - NS ATTEND AMEND GEN_ALL_CORE FT
Patient seen and examined with team. Plan as outlined.  mental status improved.  swallow today, continue tube feeds.  Daily AXR.  LFTs look good. renal function is good. cyclo, cellcept, pred taper.  Diurese, aggressive PT/OT.

## 2024-12-02 NOTE — PROGRESS NOTE ADULT - ASSESSMENT
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 completed Meropenem for presumed aspiration PNA.   - recurrent colonic ileus likely 2/2 opioid/benzo use s/p Neostigmine and Relistor challenges  - 11/29 CTAP:  distended colon again -> Rectal tube placed 11/29 + Relistor with response.   - trickle feeds   - Strict I&Os: UO  - ASA/SQH  - increase free water flushes for hypernatremia  - PT/OT/spirometry while awake    [ ] Immunosuppression  - Cyclo per level, MMF HELD, pred 20  - SIMULECT completed  - Off FK  - PPx: Valcyte 900---treating as high risk/Mepron/PPI/OsCal/Fluc    [ ] AMS  -improving today  -Reintubated 11/20 Aspiration/hypoxia, extubated 11/24->[id number]--> extubated 11/26  -Transplant Psych following.  Now on Ativan prn only  -EEG 11/30 negative, Neurology following  -off tacro, now on cyclo  -on keppra  -11/29 EEG neg seizures  -would ease off on additional ativan/narcotics to improve mental status    [ ] HTN/ pAFib  - BB as needed  - EP following, ongoing discussions about AYDEN/Cardioversions/full A/C  - Dr. 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 (splenomegaly, recanalized paraumbilical vein, paraoesophageal and tera splenic varices), and with HCC found on 9/11/23 MRI, 1.8 cm seg 5 LR-5 HCC and a 3-4 cm seg 8 LR 4 HCC s/p Y90 Sept, 2023 with favorable treatment response, now s/p OLT on 11/15    [] POD 17 s/p OLT   - good graft function  - Diet: NPO with TF, plan for FEES today   - Hypernatremia resolved: decrease free water flushes   - Pain management: avoid necrotics  - Bowel regimen  - Strict I&Os  - off anbx  - LE edema: Lasix 40mg IV x 1  - daily PT     [ ] Immunosuppression  - Tacrolimus stopped 11/18 in setting of AMS/Seizure, Started Cyclo 11/24  - Cyclo by level, Resume MMF 500mg bid, Pred 20mg daily   - SIMULECT completed  - PPx: Valcyte, Mepron, Fluc     [] lleus   - imaging with distended colon (likely opioid induced)  - s/p Neostigmine x 2  - s/p Relistor, last dose 12/1  - Daily AXR     [] CMV viremia  - CMV  on  11/24  - Repeat CMV PCR with am labs  - on Valcyte 900mg daily     [ ] AMS  - improving   - Reintubated 11/20 Aspiration/hypoxia, extubated 11/24->[id number]/24--> extubated 11/26  - EEG 11/30 negative, Neurology following  -  following   - off tacro, now on cyclo  - on keppra    [ ] HTN/ pAFib  - Cont Metoprolol 12.5mg ibed   - cont Digoxin  - start Eliquis 2.5mg bid (plan to increase to 5mg bid in few days)   - Dr. Quintanilla following  - watch Digoxin levels    [ ] DM  -Lantus/Lispro, adjust prn

## 2024-12-02 NOTE — PROGRESS NOTE ADULT - ASSESSMENT
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 Pt underwent Y90 Sept, 2023 with favorable treatment response.s/p OLT 11/15/24     # S/p OLT 11/15/24 CMV D?R? EBV , toxo D?/R?  VAlcyte, bactrim ppx  fluconazole ppx    # Fever, sepsis, hypoxic respiratory failure s/p on mechanical ventilation  Ct C/A/P Bilateral lower lobe consolidation with fluid and debris in the right lower lobe bronchi, concerning for aspiration pneumonia.  CMV PCR (11/24) 146 (low level CMV viremia - not likely contributing)  Adenovirus PCR (11/24) Negative  Cryptococcal Serum Ag (11/24) Negative  serum and bronch aspergillus galactomannan negative  WNV Serology and Serum PCR Negative  CT Chest (11/25) Groundglass opacities in the right lower and left upper lobes, possibly infectious in nature.  CT A/P (11/25) No acute process    # ileus  no diarrhea    Recommend  completed Meropenem 1 gram q 8hr (11/23 >11/29).   Repeat CMV PCR on 12/3/24 ordered  negative bartonella PCR  follow bronchoscopy cultures unrevealing  continue Valcyte 900 mg Q24H  continue fluconazole 400 mg Q24H for antifungal PPx  continue Atovaquone 1,500 mg PO Q24H for PCP PPx          Thank you for involving us in the care of this patient  Transplant ID will continue to follow  Please call or page with additional questions  Pager; #[phone]  Teams: from 8 am to 5 pm  Rachele Lewis MD

## 2024-12-02 NOTE — PROGRESS NOTE ADULT - SUBJECTIVE AND OBJECTIVE BOX
SICU Daily Progress Note  =====================================================  Interval/Overnight Events:     24 HOUR EVENTS:  - Relistor x1   - Red rubber in place, coude now then dc'ed with very large bowel movement  - IVF NaCl 0.225% @ 100   - Advance tube feeds @ 20 + free water 250 q8   - Bedside S&S for sips   - SLP tomorrow 12/2  - Chest tightness - trop reassuring    PM:  - TF -> 30  - C diff negative, GI PCR negative      Allergies: No Known Allergies      MEDICATIONS:   --------------------------------------------------------------------------------------  Neurologic Medications  levETIRAcetam  IVPB 250 milliGRAM(s) IV Intermittent every 12 hours  LORazepam   Injectable 0.5 milliGRAM(s) IV Push every 8 hours PRN Agitation    Respiratory Medications  buDESOnide    Inhalation Suspension 0.5 milliGRAM(s) Inhalation two times a day    Cardiovascular Medications  digoxin  Injectable 125 MICROGram(s) IV Push daily  metoprolol tartrate 12.5 milliGRAM(s) Oral every 12 hours    Gastrointestinal Medications  pantoprazole  Injectable 40 milliGRAM(s) IV Push daily  sodium chloride 0.225% 1000 milliLiter(s) IV Continuous <Continuous>    Genitourinary Medications  tamsulosin 0.4 milliGRAM(s) Oral at bedtime    Hematologic/Oncologic Medications  aspirin  chewable 81 milliGRAM(s) Enteral Tube daily  cycloSPORINE  , modified (GENGRAF) Solution 75 milliGRAM(s) Oral <User Schedule>  cycloSPORINE  , modified (GENGRAF) Solution 50 milliGRAM(s) Oral <User Schedule>  heparin   Injectable 5000 Unit(s) SubCutaneous every 12 hours    Antimicrobial/Immunologic Medications  atovaquone  Suspension 1500 milliGRAM(s) Oral daily  fluconAZOLE IVPB 400 milliGRAM(s) IV Intermittent every 24 hours  valGANciclovir 50 mG/mL Oral Solution 900 milliGRAM(s) Oral daily    Endocrine/Metabolic Medications  insulin lispro (ADMELOG) corrective regimen sliding scale   SubCutaneous every 4 hours  methylPREDNISolone sodium succinate Injectable 16 milliGRAM(s) IV Push daily    Topical/Other Medications  bacitracin   Ointment 1 Application(s) Topical two times a day PRN abrasions  chlorhexidine 2% Cloths 1 Application(s) Topical <User Schedule>  lidocaine   4% Patch 1 Patch Transdermal every 24 hours  nystatin Powder 1 Application(s) Topical three times a day    --------------------------------------------------------------------------------------    VITAL SIGNS, INS/OUTS (last 24 hours):  --------------------------------------------------------------------------------------  T(C): 36.6 (12-01-24 @ 22:00), Max: 37 (12-01-24 @ 19:00)  HR: 86 (12-02-24 @ 00:00) (83 - 108)  BP: 107/58 (12-02-24 @ 00:00) (92/61 - 146/67)  RR: 20 (12-02-24 @ 00:00) (12 - 26)  SpO2: 99% (12-02-24 @ 00:00) (98% - 100%)    11-30-24 @ 07:01  -  12-01-24 @ 07:00  --------------------------------------------------------  IN: 2409.2 mL / OUT: 2020 mL / NET: 389.2 mL    12-01-24 @ 07:01  -  12-02-24 @ 00:23  --------------------------------------------------------  IN: 2790 mL / OUT: 1225 mL / NET: 1565 mL      --------------------------------------------------------------------------------------    EXAM  NEUROLOGY  Exam: Normal, NAD, alert, oriented x3, no focal deficits.    HEENT  Exam: Normocephalic, atraumatic, EOMI.     RESPIRATORY  Exam: Normal expansion/effort.    CARDIOVASCULAR  Exam: Regular rate and rhythm.       GI/NUTRITION  Exam: Abdomen soft, Non-tender, Non-distended.     VASCULAR  Exam: Extremities warm, pink, well-perfused.     MUSCULOSKELETAL  Exam: All extremities moving spontaneously without limitations.     SKIN  Exam: Good skin turgor, no skin breakdown.       LABS  --------------------------------------------------------------------------------------                        10.1   17.10 )-----------( 218      ( 01 Dec 2024 05:22 )             34.2   12-01    148[H]  |  115[H]  |  57[H]  ----------------------------<  220[H]  3.4[L]   |  20[L]  |  1.18    Ca    6.9[L]      01 Dec 2024 17:42  Phos  3.1     12-01  Mg     2.4     12-01    TPro  3.7[L]  /  Alb  2.1[L]  /  TBili  0.6  /  DBili  x   /  AST  15  /  ALT  34  /  AlkPhos  157[H]  12-01  Urinalysis Basic - ( 01 Dec 2024 17:42 )    Color: x / Appearance: x / SG: x / pH: x  Gluc: 220 mg/dL / Ketone: x  / Bili: x / Urobili: x   Blood: x / Protein: x / Nitrite: x   Leuk Esterase: x / RBC: x / WBC x   Sq Epi: x / Non Sq Epi: x / Bacteria: x    PT/INR - ( 01 Dec 2024 05:22 )   PT: 13.5 sec;   INR: 1.19 ratio         PTT - ( 01 Dec 2024 05:22 )  PTT:26.8 sec  --------------------------------------------------------------------------------------

## 2024-12-02 NOTE — PROGRESS NOTE ADULT - SUBJECTIVE AND OBJECTIVE BOX
HPI:  Patient seen and examined at bedside in SICU.  Seen to be in sinus rhythm     Review Of Systems:           Respiratory: No shortness of breath, cough, or wheezing  Cardiovascular: No chest pain or palpitations  10 point review of systems is otherwise negative except as mentioned above        Medications:  apixaban 2.5 milliGRAM(s) Enteral Tube every 12 hours  aspirin  chewable 81 milliGRAM(s) Enteral Tube daily  atovaquone  Suspension 1500 milliGRAM(s) Oral daily  bacitracin   Ointment 1 Application(s) Topical two times a day PRN  buDESOnide    Inhalation Suspension 0.5 milliGRAM(s) Inhalation two times a day  chlorhexidine 2% Cloths 1 Application(s) Topical <User Schedule>  cycloSPORINE  , modified (GENGRAF) Solution 75 milliGRAM(s) Oral <User Schedule>  cycloSPORINE  , modified (GENGRAF) Solution 50 milliGRAM(s) Oral <User Schedule>  digoxin  Injectable 125 MICROGram(s) IV Push daily  doxazosin 2 milliGRAM(s) Oral at bedtime  fluconAZOLE IVPB 400 milliGRAM(s) IV Intermittent every 24 hours  insulin lispro (ADMELOG) corrective regimen sliding scale   SubCutaneous every 4 hours  levETIRAcetam  IVPB 250 milliGRAM(s) IV Intermittent every 12 hours  lidocaine   4% Patch 1 Patch Transdermal every 24 hours  LORazepam   Injectable 0.5 milliGRAM(s) IV Push every 8 hours PRN  metoprolol tartrate 12.5 milliGRAM(s) Oral every 12 hours  mycophenolate mofetil IVPB 500 milliGRAM(s) IV Intermittent <User Schedule>  nystatin Powder 1 Application(s) Topical three times a day  OLANZapine Disintegrating Tablet 10 milliGRAM(s) Oral once  pantoprazole  Injectable 40 milliGRAM(s) IV Push daily  predniSONE  Solution 20 milliGRAM(s) Oral every 24 hours  QUEtiapine 50 milliGRAM(s) Oral at bedtime  QUEtiapine 12.5 milliGRAM(s) Oral every 8 hours PRN  sodium chloride 0.45%. 1000 milliLiter(s) IV Continuous <Continuous>  valGANciclovir 50 mG/mL Oral Solution 900 milliGRAM(s) Oral daily    PAST MEDICAL & SURGICAL HISTORY:  Diabetes      Transaminitis      Paroxysmal atrial fibrillation      Depression      BPH (benign prostatic hyperplasia)      Hypertension      Chronic atrial fibrillation      Coronary artery disease      Hepatocellular carcinoma      DM (diabetes mellitus)      HTN (hypertension)      Paroxysmal atrial fibrillation      Cirrhosis      HCC (hepatocellular carcinoma)      History of BPH      History of laparoscopic cholecystectomy      History of lumbar laminectomy      H/O prior ablation treatment      H/O percutaneous left heart catheterization        Vitals:  T(C): 37 (12-02-24 @ 19:00), Max: 37 (12-02-24 @ 19:00)  HR: 92 (12-02-24 @ 21:00) (83 - 117)  BP: 127/65 (12-02-24 @ 21:00) (92/61 - 141/60)  BP(mean): 90 (12-02-24 @ 21:00) (71 - 96)  RR: 13 (12-02-24 @ 21:00) (10 - 25)  SpO2: 100% (12-02-24 @ 21:00) (96% - 100%)  Wt(kg): --  Daily     Daily   I&O's Summary    01 Dec 2024 07:01  -  02 Dec 2024 07:00  --------------------------------------------------------  IN: 4280 mL / OUT: 1225 mL / NET: 3055 mL    02 Dec 2024 07:01  -  02 Dec 2024 21:56  --------------------------------------------------------  IN: 1830 mL / OUT: 50 mL / NET: 1780 mL        Physical Exam:  Appearance: No acute distress; well appearing  Eyes: PERRL, EOMI, pink conjunctiva  HENT: Normal oral mucosa  Cardiovascular: RRR, S1, S2, no murmurs, rubs, or gallops; no edema; no JVD  Respiratory: Clear to auscultation bilaterally  Gastrointestinal: soft, non-tender, non-distended with normal bowel sounds  Musculoskeletal: No clubbing; no joint deformity   Neurologic: Non-focal  Lymphatic: No lymphadenopathy  Psychiatry: AAOx3, mood & affect appropriate  Skin: No rashes, ecchymoses, or cyanosis                          9.4    11.91 )-----------( 170      ( 02 Dec 2024 05:05 )             31.5     12-02    144  |  115[H]  |  59[H]  ----------------------------<  127[H]  3.9   |  18[L]  |  1.48[H]    Ca    7.0[L]      02 Dec 2024 18:19  Phos  3.7     12-02  Mg     2.4     12-02    TPro  3.8[L]  /  Alb  2.2[L]  /  TBili  0.6  /  DBili  x   /  AST  18  /  ALT  31  /  AlkPhos  137[H]  12-02    PT/INR - ( 02 Dec 2024 05:05 )   PT: 12.9 sec;   INR: 1.12 ratio         PTT - ( 02 Dec 2024 05:05 )  PTT:26.8 sec        Cardiovascular Diagnostic Testing:  ECG: sinus rhythm    Echo: < from: Intra-Operative Transesophageal Echo W or WO Ultrasound Enhancing Agent (11.15.24 @ 07:46) >  --------------------------------------------------------------------------------  PRE-BYPASS FINDINGS     Left Ventricle:  Left ventricular ejection fraction is estimated at 60 to 65%. Normal left ventricularwall thickness. The left ventricular systolic function is normal There are no regional wall motion abnormalities seen.     Right Ventricle:  The right ventricular cavity is normal in size, with normal wall thickness and right ventricular systolic function is normal. Right ventricular systolic function is normal.     Left Atrium:  The left atrium is normal in size. No thrombus visualized in left atrial appendage.     Right Atrium:  The right atrium is normal in size. The right atrium is normal in size.     Interatrial Septum:  No PFO visualized with color flow doppler.     Aortic Valve:  The aortic valve appears trileaflet. There is no aortic valve stenosis. There is trace aortic regurgitation.     Mitral Valve:  There is no mitral valve stenosis. There is no mitral valve stenosis. There is trace mitral regurgitation.     Tricuspid Valve:  There is no evidence of tricuspid stenosis. There is trace tricuspid regurgitation.     Pericardium:  No pericardial effusion seen.     Post-Bypass:  S/P OLT. Under current loading conditions, hyperdynamic left ventricular systolic function, EF: 70-75% by visual estimate, no RWMA. Normal right ventricular systolic function. All other findings unchanged. Probe removed atraumatically, no blood. The patient tolerated the procedure well and without complications. Permanent recorded images are stored in the medical record.     Electronically signed by James Villareal on 11/15/2024 at 2:18:16 PM         *** Final ***    < end of copied text >      Stress Testing:     Cath: 4/24/2024, patient had his LHC repeated with Ben Villareal MD at [facility].  Cath showed multivessel coronary artery disease, but the lesions previously noted were FFR negative.  He continues to have MIRTA 3 flow throughout.    Interpretation of Telemetry: Sinus     Imaging:

## 2024-12-02 NOTE — BH CONSULTATION LIAISON PROGRESS NOTE - ATTENDING COMMENTS
Case discussed with ACP, agree with above assessment and plan. Labs/chart reviewed. Coordination of care provided.  Repeat EKG to ensure Qtc < 500. Please resume Seroquel at HS for sleep architecture and anxious ruminations.

## 2024-12-02 NOTE — PROGRESS NOTE ADULT - ATTENDING COMMENTS
72 yo m, MASH cirrhosis, HCC, OLT.   N AOX3  P RA sat >90.  C Off pressors.  G TFs to 20. FEES tomorrow.  R Monitor CMP.  DVT SQH, ASA. SCDs.  I Fluconazole, atovaquone, valcyte.  E ISS. Prednisone.

## 2024-12-02 NOTE — PROGRESS NOTE ADULT - ASSESSMENT
74 year-old with known coronary artery disease as above presents for liver transplant.  Seen to have chest pain post transplant.  It was atypical of angina and has resolved.  Troponin remained negative.    Now with atrial fibrillation with RVR (11/19) - rate control with beta-blocker as needed, now status post Digoxin load  Converted to sinus rhythm on 12/2.    Rate control with betablocker and Digoxin  Anticoagulation when cleared by surgery  EP consult and follow-up appreciated

## 2024-12-02 NOTE — BH CONSULTATION LIAISON PROGRESS NOTE - NSBHFUPINTERVALHXFT_PSY_A_CORE
Patient seen on exam on interview patient was A/Ox3-4, calm, however somnolent, noted having difficulty sleeping overnight. Patient denied any Ah/Vh/Hi/SI, intent or plan, educated regarding medication recommendations, including restarting Seroquel at this time for sleep, patient amenable to restarting Seroquel use for sleep at this time.

## 2024-12-02 NOTE — SWALLOW BEDSIDE ASSESSMENT ADULT - ASR SWALLOW RECOMMEND DIAG
Recommend FEES to further assess the swallow mechanism and r/o laryngeal penetration/aspiration/FEES

## 2024-12-02 NOTE — SWALLOW BEDSIDE ASSESSMENT ADULT - SLP GENERAL OBSERVATIONS
Pt encountered OOB in chair, alert but with eyes closed, A&Ox2-3 with cues, son at bedside. Vocal quality hoarse, able to follow simple commands. +Tremulous mandible - possibly due to weakness.

## 2024-12-02 NOTE — SWALLOW BEDSIDE ASSESSMENT ADULT - ADDITIONAL RECOMMENDATIONS
Maintain good oral hygiene  Discussed with SICU team that NGT may need to be removed if negatively impacting swallow function - team in agreement

## 2024-12-02 NOTE — SWALLOW BEDSIDE ASSESSMENT ADULT - SWALLOW EVAL: DIAGNOSIS
Consult for bedside swallow evaluation received and appreciated; chart reviewed and events noted. Per discussion with RN pt is currently strict NPO 2/2 ileus. Will tentatively plan for swallow evaluation on Monday 12/2 pending improvement in status.
Patient presents with s/s concerning for oropharyngeal dysphagia likely in the setting of deconditioned state and wide bore NGT. The swallow is characterized by delayed oral transit time, suspected premature loss, multiple swallows (up to 6-7) per bolus, and subtle vocal wetness with intermittent throat clearing post intake of ice chips and moderately thick liquids via tsp.

## 2024-12-02 NOTE — PROGRESS NOTE ADULT - ASSESSMENT
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 Post-op course c/b worsening mental status and re-intubation 11/20 for acute hypoxemic respiratory failure 2/2 aspiration, extubated 11/24 and re-intubated 11/24. Pt then extubated 11/26, now on nasal cannula.  Colonic ileus s/p several rounds of Relistor and Neostigmine, rectal tube, now with improvement.  ?Steroid Psychosis, now improving, minimal narcotics/Ativan requirements.    PLAN:  Neuro:  - Off sedation   - pain control w/ Tylenol 500mg q6, oxycodone 5/10 prn   - takes xanax 2mg TID at home, s/p Benzo taper  - CT head 11/19 and 11/21 negative  - CT head 11/25 - negative   - CT head 11/29 - negative   - EEG: Mild diffuse cerebral dysfunction that is not specific in etiology. No epileptic discharges recorded. No seizures recorded.  - Keppra: 500mg BID -> 250 BID   - Seroquel 25mg q12h  - Ativan 0.5mg q8h PRN for agitation  - Holding home xanax, Abilify, Zoloft, Remeron, Lexapro   - Ammonia 23     Resp:  - Intubated 11/20 2/2 acute hypoxemic respiratory failure 2/2 aspiration   - Extubated 11/24 and reintubated 11/24 d/t tachypnea   - Extubated 11/26   - Room air  - CT chest 11/25: Groundglass opacities in the right lower and left upper lobes, possibly infectious in nature.  - budesonide, duonebs, hypertonic saline    CV:  - goal MAP > 65 mmHg  - lactate clear  - Metoprolol 25 mg q8  - Dig 125 qd  - Dig level (11/26) - 0.9    GI:   - NPO w/ NGT   - Ileus  - Relistor   - Protonix for stress ulcer prophylaxis  - ALYSSA x1, monitor output  - post-op liver doppler w/ patent vasculature   - Liver doppler 11/29: Portal venous gas visualized with c/f mesenteric ischemia.   - CT abd/pel 11/29: Dilated small and large bowel consistent with an ileus. No evidence of portal venous gas as questioned on liver Doppler.  - F/u GI recs   - Red rubber rectal tube placed 11/29    Renal:  - Monitor I&Os and electrolytes w/ repletions as necessary  - HyperNa - free water 250 q8  - NaCl 0.225% @ 100cc/hr  - Finasteride     Heme:  - Monitor CBC and coags  - SQH for VTE prophylaxis  - ASA PO  - SCDs  - RUE duplex neg 11/24  - LUE duplex 11/27 superficial thrombophlebitis, neg DVT    ID:   - Transplant ppx fluconazole, therapeutic valcyte, mepron  - immunosuppression w/ steroids, cyclosporine  - Blood cx 11/20 NGTD, 11/22 NGTD, 11/24 NGTD  - BAL 11/24 - candida glabrata (BAL fungitell >500)   - Serum fungitell neg  - Procal 0.21 -> 0.18 --> 0.32  - repeat CMV next week per ID  - f/u whether to restart jeniffer?    Endo:   - Monitor glucose   - NPH 8u q6   - moderate ISS  - post-transplant steroid taper     Lines:   - L radial A line (11/24 - )  - midline LUE (11/19 - )  - PIVs  - NGT  - JPs x1    Dispo: SICU

## 2024-12-02 NOTE — PROGRESS NOTE ADULT - SUBJECTIVE AND OBJECTIVE BOX
Follow Up:  fever    Interval History: afebrile. no acute events.     REVIEW OF SYSTEMS  [  ] ROS unobtainable because:    [ x ] All other systems negative except as noted below    Constitutional:  [ ] fever [ ] chills  [ ] weight loss  [ ] weakness  Skin:  [ ] rash [ ] phlebitis	  Eyes: [ ] icterus [ ] pain  [ ] discharge	  ENMT: [ ] sore throat  [ ] thrush [ ] ulcers [ ] exudates  Respiratory: [ ] dyspnea [ ] hemoptysis [ ] cough [ ] sputum	  Cardiovascular:  [ ] chest pain [ ] palpitations [ ] edema	  Gastrointestinal:  [ ] nausea [ ] vomiting [ ] diarrhea [ ] constipation [ ] pain	  Genitourinary:  [ ] dysuria [ ] frequency [ ] hematuria [ ] discharge [ ] flank pain  [ ] incontinence  Musculoskeletal:  [ ] myalgias [ ] arthralgias [ ] arthritis  [ ] back pain  Neurological:  [ ] headache [ ] seizures  [ ] confusion/altered mental status    Allergies  No Known Allergies        ANTIMICROBIALS:  atovaquone  Suspension 1500 daily  fluconAZOLE IVPB 400 every 24 hours  valGANciclovir 50 mG/mL Oral Solution 900 daily      OTHER MEDS:  MEDICATIONS  (STANDING):  acetaminophen   IVPB .. 500 every 6 hours PRN  albuterol/ipratropium for Nebulization 3 every 6 hours  aspirin  chewable 81 daily  buDESOnide    Inhalation Suspension 0.5 two times a day  cycloSPORINE  , modified (GENGRAF) Solution 50 <User Schedule>  digoxin  Injectable 125 daily  finasteride 5 daily  heparin   Injectable 5000 every 12 hours  HYDROmorphone  Injectable 0.5 every 4 hours PRN  insulin lispro (ADMELOG) corrective regimen sliding scale  every 6 hours  insulin NPH human recombinant 8 every 6 hours  levETIRAcetam   Injectable 250 every 12 hours  LORazepam   Injectable 0.5 every 8 hours PRN  metoprolol tartrate Injectable 5 every 6 hours  pantoprazole  Injectable 40 daily  polyethylene glycol 3350 17 every 24 hours  predniSONE   Tablet 20 daily  QUEtiapine 12.5 every 8 hours PRN  QUEtiapine 75 at bedtime  senna Syrup 10 every 24 hours  ursodiol Suspension 300 every 12 hours      Vital Signs Last 24 Hrs  T(C): 37.4 (2024 11:00), Max: 37.4 (2024 11:00)  T(F): 99.3 (2024 11:00), Max: 99.3 (2024 11:00)  HR: 114 (2024 14:00) (85 - 117)  BP: 116/67 (2024 14:00) (105/64 - 136/62)  BP(mean): 85 (2024 14:00) (76 - 93)  RR: 22 (2024 14:00) (12 - 31)  SpO2: 95% (2024 14:00) (93% - 100%)    Parameters below as of 2024 11:02  Patient On (Oxygen Delivery Method): room air      PHYSICAL EXAMINATION:  General: Alert and Awake, NAD  Cardiac: RRR, No M/R/G  Resp: CTAB, No Wh/Rh/Ra  Abdomen: NBS, NT/ND, No HSM, No rigidity or guarding  MSK: No LE edema. No Calf tenderness  Skin: No rashes or lesions. Skin is warm and dry to the touch.   Neuro: Alert and Awake. CN 2-12 Grossly intact. Moves all four extremities spontaneously.  Psych: Encephalopathic - unable to assess                          10.8   18.62 )-----------( 225      ( 2024 06:13 )             36.1           144  |  110[H]  |  58[H]  ----------------------------<  290[H]  4.2   |  20[L]  |  1.40[H]    Ca    7.2[L]      2024 12:31  Phos  3.7       Mg     2.4         TPro  4.3[L]  /  Alb  2.4[L]  /  TBili  0.9  /  DBili  x   /  AST  48[H]  /  ALT  52[H]  /  AlkPhos  243[H]        Urinalysis Basic - ( 2024 12:31 )    Color: x / Appearance: x / SG: x / pH: x  Gluc: 290 mg/dL / Ketone: x  / Bili: x / Urobili: x   Blood: x / Protein: x / Nitrite: x   Leuk Esterase: x / RBC: x / WBC x   Sq Epi: x / Non Sq Epi: x / Bacteria: x        MICROBIOLOGY:  v  .Blood BLOOD  24   No growth at 72 Hours  --  --      .Blood BLOOD  24   No growth at 72 Hours  --  --      Bronchial  24   Few Candida glabrata  --  Candida (Torulopsis) glabrata      .Blood BLOOD  24   No growth at 5 days  --  --      .Blood BLOOD  24   No growth at 5 days  --  --      Combi-Cath  24   Commensal charity consistent with body site  --    Moderate polymorphonuclear leukocytes per low power field  No squamous epithelial cells per low power field  No organisms seen      .Blood BLOOD  24   No growth at 5 days  --  --      .Blood BLOOD  24   No growth at 5 days  --  --      .Blood BLOOD  24   No growth at 5 days  --  --      Body Fluid  11-15-24   No growth at 5 days  --    No polymorphonuclear leukocytes seen  No organisms seen  by cytocentrifuge        Toxoplasma IgG Screen: 78.00 IU/mL (11-15-24 @ 00:41)  CMV IgG Antibody: 1.50 U/mL (11-15-24 @ 00:41)    Rapid RVP Result: NotDetec ( @ 14:53)  CMVPCR Lo.16 Fyi69NQ/mL ( @ 11:24)        RADIOLOGY:    <The imaging below has been reviewed and visualized by me independently. Findings as detailed in report below>    < from: CT Abdomen and Pelvis w/ IV Cont (24 @ 14:58) >  IMPRESSION:  Dilated small and large bowel consistent with an ileus.    No evidence of portal venous gas as questioned on liver Doppler.    Status post orthotopic liver transplant. No focal hepatic collection.   Patent hepatic vasculature.    < end of copied text >

## 2024-12-02 NOTE — SWALLOW BEDSIDE ASSESSMENT ADULT - SWALLOW EVAL: PATIENT/FAMILY GOALS STATEMENT
Pt's son at bedside (physician) denied dysphagia PTA. Endorsed pt's mentation has been fluctuating, but that he has periods of improved alertness in comparison to mentation at time of swallow evaluation.

## 2024-12-03 NOTE — CHART NOTE - NSCHARTNOTEFT_GEN_A_CORE
NUTRITION FOLLOW UP NOTE    PATIENT SEEN FOR: malnutrition/sicu follow up     SOURCE: [x] Patient  [x] Current Medical Record  [x] RN  [] Family/support person at bedside  [] Patient unavailable/inappropriate  [x] Other: Team     CHART REVIEWED/EVENTS NOTED.  [x] Nutrition Status:  -S/p OLT 11/5  -Re-intubated 11/24; extubated 11/26   -Colonic ileus post-op; rectal tube in place   -Pt removed NGT overnight; was receiving enteral feeds  -S/p FEES: minced and moist with moderately thickened liquids (12/3)     Diet, Minced and Moist:   Moderately Thick Liquids (MODTHICKLIQS) (12-03-24 @ 09:58) [Active]    NUTRITION INTAKE/PROVISION:  [x] PO: <50% intake of lunch today   [x] Enteral Nutrition:  -<50% intake of enteral feeds x >/5 days (11/27-12/2)     ANTHROPOMETRICS:  Drug Dosing Weight  Height (cm): 182.9 (15 Nov 2024 05:30)  Weight (kg): 93.6 (15 Nov 2024 05:30)  BMI (kg/m2): 28 (15 Nov 2024 05:30)    NUTRITIONALLY PERTINENT MEDICATIONS:  MEDICATIONS  (STANDING):  atovaquone  Suspension  digoxin  Injectable  doxazosin  metoprolol tartrate  pantoprazole  Injectable  predniSONE   Tablet  sodium bicarbonate  valGANciclovir 50 mG/mL Oral Solution       NUTRITIONALLY PERTINENT LABS:  12-03 Na142 mmol/L Glu 170 mg/dL[H] K+ 3.5 mmol/L Cr  1.25 mg/dL BUN 52 mg/dL[H] 12-03 Phos 3.2 mg/dL 12-03 Alb 2.1 g/dL[L]12-03 ALT 28 U/L AST 17 U/L Alkaline Phosphatase 129 U/L[H]            Finger Sticks:  POCT Blood Glucose.: 161 mg/dL (12-03 @ 06:00)  POCT Blood Glucose.: 139 mg/dL (12-02 @ 22:38)  POCT Blood Glucose.: 122 mg/dL (12-02 @ 18:09)      NUTRITIONALLY PERTINENT MEDICATIONS/LABS:  [x] Reviewed  [x] Relevant notes on medications/labs:  -Cyclosporine, Cellcept   -Prednisone      EDEMA:  [x] Reviewed  [] Relevant notes:    GI/ I&O:  [x] Reviewed  [] Relevant notes:  [] Other:    SKIN:  [x] Pressure injury previously noted      ESTIMATED NEEDS:  [x] No change:  Energy:   2178-2582kcal/day (27-32 kcal/kg)  Protein:   97-121g/day (1.2-1.5 g/kg)  Fluid:   ml/day or [x] defer to team  Based on: 80.7kg     NUTRITION DIAGNOSIS:  [x] Prior Dx:  1. Increased protein-energy needs   2. Severe malnutrition (Acute)    EDUCATION:  [] Yes:  [x] Not appropriate/warranted    NUTRITION CARE PLAN:  1. Diet: regular (liberalized diet); defer consistency to SLP and Team   -Monitor need for Consistent carbohydrate diet   2. Supplements: Ensure Max BID  3. Multivitamin/mineral supplementation: [id number], multivitamin, vitamin C       MONITORING AND EVALUATION:   RD remains available upon request and will follow up per protocol.    Malorie Pike, MS, RDN, CDN (Teams)   Available on [facility] TEAMS NUTRITION FOLLOW UP NOTE    PATIENT SEEN FOR: malnutrition/sicu follow up     SOURCE: [x] Patient  [x] Current Medical Record  [x] RN  [] Family/support person at bedside  [] Patient unavailable/inappropriate  [x] Other: Team     CHART REVIEWED/EVENTS NOTED.  [x] Nutrition Status:  -S/p OLT 11/15  -Re-intubated 11/24; extubated 11/26   -Colonic ileus post-op; rectal tube in place   -Pt removed NGT overnight; was receiving enteral feeds  -S/p FEES: minced and moist with moderately thickened liquids (12/3)     Diet, Minced and Moist:   Moderately Thick Liquids (MODTHICKLIQS) (12-03-24 @ 09:58) [Active]    NUTRITION INTAKE/PROVISION:  [x] PO: <50% intake of lunch today   [x] Enteral Nutrition:  -<50% intake of enteral feeds x >/5 days (11/27-12/2)     ANTHROPOMETRICS:  Drug Dosing Weight  Height (cm): 182.9 (15 Nov 2024 05:30)  Weight (kg): 93.6 (15 Nov 2024 05:30)  BMI (kg/m2): 28 (15 Nov 2024 05:30)    NUTRITIONALLY PERTINENT MEDICATIONS:  MEDICATIONS  (STANDING):  atovaquone  Suspension  digoxin  Injectable  doxazosin  metoprolol tartrate  pantoprazole  Injectable  predniSONE   Tablet  sodium bicarbonate  valGANciclovir 50 mG/mL Oral Solution       NUTRITIONALLY PERTINENT LABS:  12-03 Na142 mmol/L Glu 170 mg/dL[H] K+ 3.5 mmol/L Cr  1.25 mg/dL BUN 52 mg/dL[H] 12-03 Phos 3.2 mg/dL 12-03 Alb 2.1 g/dL[L]12-03 ALT 28 U/L AST 17 U/L Alkaline Phosphatase 129 U/L[H]            Finger Sticks:  POCT Blood Glucose.: 161 mg/dL (12-03 @ 06:00)  POCT Blood Glucose.: 139 mg/dL (12-02 @ 22:38)  POCT Blood Glucose.: 122 mg/dL (12-02 @ 18:09)      NUTRITIONALLY PERTINENT MEDICATIONS/LABS:  [x] Reviewed  [x] Relevant notes on medications/labs:  -Cyclosporine, Cellcept   -Prednisone      EDEMA:  [x] Reviewed  [] Relevant notes:    GI/ I&O:  [x] Reviewed  [] Relevant notes:  [] Other:    SKIN:  [x] Pressure injury previously noted      ESTIMATED NEEDS:  [x] No change:  Energy:   2178-2582kcal/day (27-32 kcal/kg)  Protein:   97-121g/day (1.2-1.5 g/kg)  Fluid:   ml/day or [x] defer to team  Based on: 80.7kg     NUTRITION DIAGNOSIS:  [x] Prior Dx:  1. Increased protein-energy needs   2. Severe malnutrition (Acute)    EDUCATION:  [] Yes:  [x] Not appropriate/warranted    NUTRITION CARE PLAN:  1. Diet: regular (liberalized diet); defer consistency to SLP and Team   -Monitor need for Consistent carbohydrate diet   2. Supplements: Ensure Max BID  3. Multivitamin/mineral supplementation: [id number]+D, multivitamin, vitamin C       MONITORING AND EVALUATION:   RD remains available upon request and will follow up per protocol.    Malorie Pike, MS, RDN, CDN (Teams)   Available on [facility] TEAMS

## 2024-12-03 NOTE — PROGRESS NOTE ADULT - ASSESSMENT
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 liver cirrhosis with portal htn (splenomegaly, recanalized paraumbilical vein, paraoesophageal and tera splenic varices), and with HCC found on 9/11/23 MRI, 1.8 cm seg 5 LR-5 HCC and a 3-4 cm seg 8 LR 4 HCC s/p Y90 Sept, 2023 with favorable treatment response, now s/p OLT on 11/15    [] POD 17 s/p OLT   - good graft function  - Diet: NPO with TF, plan for FEES today   - Hypernatremia resolved  - Pain management: avoid necrotics  - Bowel regimen  - Strict I&Os  - off abx  - LE edema: diuresing as able     - daily PT     [ ] Immunosuppression  - Tacrolimus stopped 11/18 in setting of AMS/Seizure, Started Cyclo 11/24  - Cyclo by level, MMF 500mg bid, Pred 20mg daily   - SIMULECT completed  - PPx: Valcyte, Mepron, Fluc     [] lleus   - imaging with distended colon (likely opioid induced)  - s/p Neostigmine x 2  - s/p Relistor, last dose 12/1  - Daily AXR     [] CMV viremia  - CMV  on  11/24  - Repeat CMV PCR with am labs  - on Valcyte 900mg daily     [ ] AMS  - improving   - Reintubated 11/20 Aspiration/hypoxia, extubated 11/24->[id number]--> extubated 11/26  - EEG 11/30 negative, Neurology following  -  following   - off tacro, now on cyclo  - on keppra    [ ] HTN/ pAFib  - Cont Metoprolol 12.5mg ibed   - cont Digoxin  - Eliquis 2.5mg bid (plan to increase to 5mg bid in few days)   - Dr. 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 (splenomegaly, recanalized paraumbilical vein, paraoesophageal and tera splenic varices), and with HCC found on 9/11/23 MRI, 1.8 cm seg 5 LR-5 HCC and a 3-4 cm seg 8 LR 4 HCC s/p Y90 Sept, 2023 with favorable treatment response, now s/p OLT on 11/15    [] POD 18 s/p OLT   - good graft function  - Diet: passed FEES, minced/moist diet  - Hypernatremia resolved  - Pain management: avoid necrotics  - Bowel regimen  - Strict I&Os, Daily weights  - lasix PRN  - start bicarb  - LE edema, US: L brachial DVT   - daily PT     [ ] Immunosuppression  - Tacrolimus stopped 11/18 in setting of AMS/Seizure, Started Cyclo 11/24  - Cyclo by level, MMF 500mg bid, Pred 20mg daily   - SIMULECT completed  - PPx: Valcyte, Mepron, Fluc     [] lleus   - imaging with distended colon (likely opioid induced)  - s/p Neostigmine x 2  - s/p Relistor, last dose 12/1  - Daily AXR     [] CMV viremia  - CMV  on  11/24  - Repeat CMV PCR 12/2: 34.5  - on Valcyte 900mg daily     [ ] AMS  - improving   - Reintubated 11/20 Aspiration/hypoxia, extubated 11/24->[id number]--> extubated 11/26  - EEG 11/30 negative, Neurology following  -  following   - off tacro, now on cyclo  - on keppra    [ ] HTN/ pAFib  - Cont Metoprolol 12.5mg ibed   - cont Digoxin  - Eliquis 2.5mg bid (plan to increase to 5mg bid tomorrow)   - Dr. Quintanilla following  - watch Digoxin levels    [ ] DM  -Lantus/Lispro, adjust prn

## 2024-12-03 NOTE — PROGRESS NOTE ADULT - SUBJECTIVE AND OBJECTIVE BOX
HPI:  Patient seen and examined at bedside in SICU.  Maintaining sinus rhythm.     Review Of Systems:           Respiratory: No shortness of breath, cough, or wheezing  Cardiovascular: No chest pain or palpitations  10 point review of systems is otherwise negative except as mentioned above        Medications:  apixaban 2.5 milliGRAM(s) Oral every 12 hours  aspirin  chewable 81 milliGRAM(s) Oral daily  atovaquone  Suspension 1500 milliGRAM(s) Oral daily  bacitracin   Ointment 1 Application(s) Topical two times a day PRN  buDESOnide    Inhalation Suspension 0.5 milliGRAM(s) Inhalation two times a day  chlorhexidine 2% Cloths 1 Application(s) Topical <User Schedule>  cycloSPORINE  , modified (GENGRAF) 50 milliGRAM(s) Oral <User Schedule>  cycloSPORINE  , modified (GENGRAF) 75 milliGRAM(s) Oral <User Schedule>  digoxin  Injectable 125 MICROGram(s) IV Push daily  doxazosin 2 milliGRAM(s) Oral at bedtime  levETIRAcetam  IVPB 250 milliGRAM(s) IV Intermittent every 12 hours  lidocaine   4% Patch 1 Patch Transdermal every 24 hours  LORazepam   Injectable 0.5 milliGRAM(s) IV Push every 8 hours PRN  metoprolol tartrate 25 milliGRAM(s) Oral every 12 hours  mycophenolate mofetil 500 milliGRAM(s) Oral <User Schedule>  nystatin Powder 1 Application(s) Topical three times a day  oxyCODONE    Solution 2.5 milliGRAM(s) Oral every 4 hours PRN  oxyCODONE    Solution 5 milliGRAM(s) Oral every 4 hours PRN  pantoprazole  Injectable 40 milliGRAM(s) IV Push daily  predniSONE   Tablet 20 milliGRAM(s) Oral daily  sodium bicarbonate 1300 milliGRAM(s) Oral two times a day  valGANciclovir 50 mG/mL Oral Solution 900 milliGRAM(s) Oral daily    PAST MEDICAL & SURGICAL HISTORY:  Diabetes      Transaminitis      Paroxysmal atrial fibrillation      Depression      BPH (benign prostatic hyperplasia)      Hypertension      Chronic atrial fibrillation      Coronary artery disease      Hepatocellular carcinoma      DM (diabetes mellitus)      HTN (hypertension)      Paroxysmal atrial fibrillation      Cirrhosis      HCC (hepatocellular carcinoma)      History of BPH      History of laparoscopic cholecystectomy      History of lumbar laminectomy      H/O prior ablation treatment      H/O percutaneous left heart catheterization        Vitals:  T(C): 36.4 (12-03-24 @ 19:00), Max: 37.3 (12-03-24 @ 11:00)  HR: 101 (12-03-24 @ 20:00) (93 - 124)  BP: 124/58 (12-03-24 @ 20:00) (108/72 - 146/82)  BP(mean): 81 (12-03-24 @ 20:00) (80 - 106)  RR: 24 (12-03-24 @ 20:00) (15 - 28)  SpO2: 99% (12-03-24 @ 20:00) (96% - 100%)  Wt(kg): --  Daily     Daily   I&O's Summary    02 Dec 2024 07:01  -  03 Dec 2024 07:00  --------------------------------------------------------  IN: 2363 mL / OUT: 625 mL / NET: 1738 mL    03 Dec 2024 07:01  -  03 Dec 2024 22:10  --------------------------------------------------------  IN: 933.3 mL / OUT: 200 mL / NET: 733.3 mL        Physical Exam:  Appearance: No acute distress; well appearing  Eyes: PERRL, EOMI, pink conjunctiva  HENT: Normal oral mucosa  Cardiovascular: RRR, S1, S2, no murmurs, rubs, or gallops; no edema; no JVD  Respiratory: Clear to auscultation bilaterally  Gastrointestinal: soft, non-tender, non-distended with normal bowel sounds  Musculoskeletal: No clubbing; no joint deformity   Neurologic: Non-focal  Lymphatic: No lymphadenopathy  Psychiatry: AAOx3, mood & affect appropriate  Skin: No rashes, ecchymoses, or cyanosis                          10.0   11.03 )-----------( 195      ( 03 Dec 2024 18:27 )             33.8     12-03    141  |  112[H]  |  52[H]  ----------------------------<  290[H]  4.0   |  15[L]  |  1.43[H]    Ca    7.3[L]      03 Dec 2024 18:27  Phos  3.3     12-03  Mg     2.2     12-03    TPro  4.1[L]  /  Alb  2.2[L]  /  TBili  0.7  /  DBili  x   /  AST  21  /  ALT  29  /  AlkPhos  133[H]  12-03    PT/INR - ( 03 Dec 2024 18:27 )   PT: 13.8 sec;   INR: 1.20 ratio         PTT - ( 03 Dec 2024 18:27 )  PTT:26.6 sec      Cardiovascular Diagnostic Testing:  ECG: sinus rhythm    Echo: < from: Intra-Operative Transesophageal Echo W or WO Ultrasound Enhancing Agent (11.15.24 @ 07:46) >  --------------------------------------------------------------------------------  PRE-BYPASS FINDINGS     Left Ventricle:  Left ventricular ejection fraction is estimated at 60 to 65%. Normal left ventricularwall thickness. The left ventricular systolic function is normal There are no regional wall motion abnormalities seen.     Right Ventricle:  The right ventricular cavity is normal in size, with normal wall thickness and right ventricular systolic function is normal. Right ventricular systolic function is normal.     Left Atrium:  The left atrium is normal in size. No thrombus visualized in left atrial appendage.     Right Atrium:  The right atrium is normal in size. The right atrium is normal in size.     Interatrial Septum:  No PFO visualized with color flow doppler.     Aortic Valve:  The aortic valve appears trileaflet. There is no aortic valve stenosis. There is trace aortic regurgitation.     Mitral Valve:  There is no mitral valve stenosis. There is no mitral valve stenosis. There is trace mitral regurgitation.     Tricuspid Valve:  There is no evidence of tricuspid stenosis. There is trace tricuspid regurgitation.     Pericardium:  No pericardial effusion seen.     Post-Bypass:  S/P OLT. Under current loading conditions, hyperdynamic left ventricular systolic function, EF: 70-75% by visual estimate, no RWMA. Normal right ventricular systolic function. All other findings unchanged. Probe removed atraumatically, no blood. The patient tolerated the procedure well and without complications. Permanent recorded images are stored in the medical record.     Electronically signed by James Villareal on 11/15/2024 at 2:18:16 PM         *** Final ***    < end of copied text >      Stress Testing:     Cath: 4/24/2024, patient had his LHC repeated with Ben Villareal MD at [facility].  Cath showed multivessel coronary artery disease, but the lesions previously noted were FFR negative.  He continues to have MIRTA 3 flow throughout.    Interpretation of Telemetry: Sinus     Imaging:

## 2024-12-03 NOTE — PROGRESS NOTE ADULT - SUBJECTIVE AND OBJECTIVE BOX
Transplant Surgery - Multidisciplinary Progress Note   --------------------------------------------------------------  OLT   11/15/2024        POD#18     Present:   Patient seen and examined with multidisciplinary Transplant team including Surgeon: Dr. Dagher, Dr. Sorto,  Hepatologist: Dr. Luis, JAZZ Nguyễn SICU team in AM rounds.   Disciplines not in attendance will be notified of the plan.     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     s/p OLT 11/15 with post op course c/b:  ·	Hypoxia: Reintubated 11/20, extubated 11/24->reintubated 11/24, extubated 11/26 (aspiration PNA, steroid psychosis)  ·	Ileus   ·	Fever  ·	A fib  ·	AMS/Seizure     Interval Events:  - afebrile, vss  - started eliquis 2.5mg BID for AFib   - overnight pt pulled out NGT  - pending FEES       Immunosuppression:  -Cyclo per level, MMF HELD, pred 20  -ongoing monitoring for signs of rejection  Potential Discharge date: Pending clinical course       Tx data here     Review of systems  All other systems were reviewed and are negative, except as noted.      PHYSICAL EXAM:  Constitutional: NAD  Eyes:  PERRLA  ENMT: nc/at, no thrush   Neck: supple   Respiratory: CTA B/L  Cardiovascular: RRR  Gastrointestinal: softly distended--improving, appropriate incisional TTP. chevron incision c/d/i, staples in place. No signs of infection.   Genitourinary: Voiding,   Extremities: SCD's in place and working bilaterally, + LE edema  Neurological: A&O x3  Skin: no rashes, ulcerations, lesions   Transplant Surgery - Multidisciplinary Progress Note   --------------------------------------------------------------  OLT   11/15/2024        POD#18     Present:   Patient seen and examined with multidisciplinary Transplant team including Surgeon: Dr. Dagher, Dr. Sorto,  Hepatologist: Dr. Luis, [facility].   Disciplines not in attendance will be notified of the plan.     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     s/p OLT 11/15 with post op course c/b:  ·	Hypoxia: Reintubated 11/20, extubated 11/24->reintubated 11/24, extubated 11/26 (aspiration PNA, steroid psychosis)  ·	Ileus   ·	Fever  ·	A fib  ·	AMS/Seizure     Interval Events:  - afebrile, vss  - started eliquis 2.5mg BID for AFib   - overnight pt pulled out NGT    Immunosuppression:  -Cyclo per level, MMF 500mg BID, pred 20  -ongoing monitoring for signs of rejection  Potential Discharge date: Pending clinical course       MEDICATIONS  (STANDING):  apixaban 2.5 milliGRAM(s) Oral every 12 hours  aspirin  chewable 81 milliGRAM(s) Oral daily  atovaquone  Suspension 1500 milliGRAM(s) Oral daily  buDESOnide    Inhalation Suspension 0.5 milliGRAM(s) Inhalation two times a day  chlorhexidine 2% Cloths 1 Application(s) Topical <User Schedule>  cycloSPORINE  , modified (GENGRAF) Solution 75 milliGRAM(s) Oral <User Schedule>  cycloSPORINE  , modified (GENGRAF) Solution 50 milliGRAM(s) Oral <User Schedule>  digoxin  Injectable 125 MICROGram(s) IV Push daily  doxazosin 2 milliGRAM(s) Oral at bedtime  levETIRAcetam  IVPB 250 milliGRAM(s) IV Intermittent every 12 hours  lidocaine   4% Patch 1 Patch Transdermal every 24 hours  metoprolol tartrate 12.5 milliGRAM(s) Oral every 12 hours  mycophenolate mofetil IVPB 500 milliGRAM(s) IV Intermittent <User Schedule>  nystatin Powder 1 Application(s) Topical three times a day  pantoprazole  Injectable 40 milliGRAM(s) IV Push daily  predniSONE   Tablet 20 milliGRAM(s) Oral daily  sodium bicarbonate 1300 milliGRAM(s) Oral two times a day  valGANciclovir 50 mG/mL Oral Solution 900 milliGRAM(s) Oral daily    MEDICATIONS  (PRN):  bacitracin   Ointment 1 Application(s) Topical two times a day PRN abrasions  LORazepam   Injectable 0.5 milliGRAM(s) IV Push every 8 hours PRN Agitation  oxyCODONE    Solution 2.5 milliGRAM(s) Oral every 4 hours PRN Moderate Pain (4 - 6)  oxyCODONE    Solution 5 milliGRAM(s) Oral every 4 hours PRN Severe Pain (7 - 10)      PAST MEDICAL & SURGICAL HISTORY:  Diabetes      Transaminitis      Paroxysmal atrial fibrillation      Depression      BPH (benign prostatic hyperplasia)      Hypertension      Chronic atrial fibrillation      Coronary artery disease      Hepatocellular carcinoma      DM (diabetes mellitus)      HTN (hypertension)      Paroxysmal atrial fibrillation      Cirrhosis      HCC (hepatocellular carcinoma)      History of BPH      History of laparoscopic cholecystectomy      History of lumbar laminectomy      H/O prior ablation treatment      H/O percutaneous left heart catheterization          Vital Signs Last 24 Hrs  T(C): 37.3 (03 Dec 2024 11:00), Max: 37.3 (03 Dec 2024 11:00)  T(F): 99.1 (03 Dec 2024 11:00), Max: 99.1 (03 Dec 2024 11:00)  HR: 101 (03 Dec 2024 15:00) (84 - 124)  BP: 108/72 (03 Dec 2024 15:00) (108/72 - 145/62)  BP(mean): 87 (03 Dec 2024 15:00) (82 - 104)  RR: 24 (03 Dec 2024 15:00) (10 - 28)  SpO2: 99% (03 Dec 2024 15:00) (96% - 100%)    Parameters below as of 03 Dec 2024 15:00  Patient On (Oxygen Delivery Method): room air        I&O's Summary    02 Dec 2024 07:01  -  03 Dec 2024 07:00  --------------------------------------------------------  IN: 2363 mL / OUT: 625 mL / NET: 1738 mL    03 Dec 2024 07:01  -  03 Dec 2024 15:41  --------------------------------------------------------  IN: 733.3 mL / OUT: 0 mL / NET: 733.3 mL                              9.8    12.32 )-----------( 188      ( 03 Dec 2024 06:11 )             33.8     12-03    142  |  111[H]  |  52[H]  ----------------------------<  170[H]  3.5   |  16[L]  |  1.25    Ca    7.1[L]      03 Dec 2024 06:12  Phos  3.2     12-03  Mg     2.2     12-03    TPro  3.8[L]  /  Alb  2.1[L]  /  TBili  0.6  /  DBili  x   /  AST  17  /  ALT  28  /  AlkPhos  129[H]  12-03          Culture - Stool (collected 12-01-24 @ 08:20)  Source: .Stool  Preliminary Report (12-02-24 @ 15:59):    No enteric pathogens to date: Final culture pending        Review of systems  All other systems were reviewed and are negative, except as noted.      PHYSICAL EXAM:  Constitutional: NAD  Eyes:  PERRLA  ENMT: nc/at, no thrush   Neck: supple   Respiratory: CTA B/L  Cardiovascular: RRR  Gastrointestinal: softly distended--improving, appropriate incisional TTP. chevron incision c/d/i, staples in place. No signs of infection.   Genitourinary: Voiding,   Extremities: SCD's in place and working bilaterally, + LE edema  Neurological: A&O x3  Skin: no rashes, ulcerations, lesions

## 2024-12-03 NOTE — PROGRESS NOTE ADULT - ASSESSMENT
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 Pt underwent Y90 Sept, 2023 with favorable treatment response.s/p OLT 11/15/24     # S/p OLT 11/15/24 CMV D?R? EBV , toxo D?/R?  VAlcyte, bactrim ppx  fluconazole ppx    # Fever, sepsis, hypoxic respiratory failure s/p on mechanical ventilation  Ct C/A/P Bilateral lower lobe consolidation with fluid and debris in the right lower lobe bronchi, concerning for aspiration pneumonia.  CMV PCR (11/24) 146 (low level CMV viremia - not likely contributing)  Adenovirus PCR (11/24) Negative  Cryptococcal Serum Ag (11/24) Negative  serum and bronch aspergillus galactomannan negative  WNV Serology and Serum PCR Negative  CT Chest (11/25) Groundglass opacities in the right lower and left upper lobes, possibly infectious in nature.  CT A/P (11/25) No acute process    # ileus  no diarrhea    Recommend  completed Meropenem 1 gram q 8hr (11/23 >11/29).   Repeat CMV PCR on 12/3/24, <34.5, continue CMV PPx  negative bartonella PCR  follow bronchoscopy cultures unrevealing  continue Valcyte 900 mg Q24H  continue fluconazole 400 mg Q24H for antifungal PPx  continue Atovaquone 1,500 mg PO Q24H for PCP PPx    Dr. Rachele Lewis will be covering the patient starting from tomorrow. Please reach out to her for further questions and follow up.     Kyle Junior M.D.  [facility] Division of Infectious Disease  8AM-5PM Monday - Friday: Available on [employer] Teams  After Hours and Holidays (or if no response on [employer] Teams): Please contact the Infectious Diseases Office at [phone]

## 2024-12-03 NOTE — PROGRESS NOTE ADULT - SUBJECTIVE AND OBJECTIVE BOX
Follow Up:      Interval History:    REVIEW OF SYSTEMS  [  ] ROS unobtainable because:    [  ] All other systems negative except as noted below    Constitutional:  [ ] fever [ ] chills  [ ] weight loss  [ ] weakness  Skin:  [ ] rash [ ] phlebitis	  Eyes: [ ] icterus [ ] pain  [ ] discharge	  ENMT: [ ] sore throat  [ ] thrush [ ] ulcers [ ] exudates  Respiratory: [ ] dyspnea [ ] hemoptysis [ ] cough [ ] sputum	  Cardiovascular:  [ ] chest pain [ ] palpitations [ ] edema	  Gastrointestinal:  [ ] nausea [ ] vomiting [ ] diarrhea [ ] constipation [ ] pain	  Genitourinary:  [ ] dysuria [ ] frequency [ ] hematuria [ ] discharge [ ] flank pain  [ ] incontinence  Musculoskeletal:  [ ] myalgias [ ] arthralgias [ ] arthritis  [ ] back pain  Neurological:  [ ] headache [ ] seizures  [ ] confusion/altered mental status    Allergies  No Known Allergies        ANTIMICROBIALS:  atovaquone  Suspension 1500 daily  valGANciclovir 50 mG/mL Oral Solution 900 daily      OTHER MEDS:  MEDICATIONS  (STANDING):  apixaban 2.5 every 12 hours  aspirin  chewable 81 daily  buDESOnide    Inhalation Suspension 0.5 two times a day  cycloSPORINE  , modified (GENGRAF) Solution 75 <User Schedule>  cycloSPORINE  , modified (GENGRAF) Solution 50 <User Schedule>  digoxin  Injectable 125 daily  doxazosin 2 at bedtime  levETIRAcetam  IVPB 250 every 12 hours  LORazepam   Injectable 0.5 every 8 hours PRN  metoprolol tartrate 12.5 every 12 hours  mycophenolate mofetil IVPB 500 <User Schedule>  oxyCODONE    Solution 2.5 every 4 hours PRN  oxyCODONE    Solution 5 every 4 hours PRN  pantoprazole  Injectable 40 daily  predniSONE   Tablet 20 daily      Vital Signs Last 24 Hrs  T(C): 36.2 (03 Dec 2024 15:00), Max: 37.3 (03 Dec 2024 11:00)  T(F): 97.2 (03 Dec 2024 15:00), Max: 99.1 (03 Dec 2024 11:00)  HR: 102 (03 Dec 2024 15:36) (84 - 124)  BP: 108/72 (03 Dec 2024 15:00) (108/72 - 145/62)  BP(mean): 87 (03 Dec 2024 15:00) (82 - 104)  RR: 24 (03 Dec 2024 15:36) (10 - 28)  SpO2: 99% (03 Dec 2024 15:36) (96% - 100%)    Parameters below as of 03 Dec 2024 15:36  Patient On (Oxygen Delivery Method): room air        PHYSICAL EXAMINATION:  General: Alert and Awake, NAD  HEENT: PERRL, EOMI  Neck: Supple  Cardiac: RRR, No M/R/G  Resp: CTAB, No Wh/Rh/Ra  Abdomen: NBS, NT/ND, No HSM, No rigidity or guarding  MSK: No LE edema. No Calf tenderness  : No nichols  Skin: No rashes or lesions. Skin is warm and dry to the touch.   Neuro: Alert and Awake. CN 2-12 Grossly intact. Moves all four extremities spontaneously.  Psych: Calm, Pleasant, Cooperative                          9.8    12.32 )-----------( 188      ( 03 Dec 2024 06:11 )             33.8       12-03    142  |  111[H]  |  52[H]  ----------------------------<  170[H]  3.5   |  16[L]  |  1.25    Ca    7.1[L]      03 Dec 2024 06:12  Phos  3.2     12-03  Mg     2.2     12-03    TPro  3.8[L]  /  Alb  2.1[L]  /  TBili  0.6  /  DBili  x   /  AST  17  /  ALT  28  /  AlkPhos  129[H]  12-03      Urinalysis Basic - ( 03 Dec 2024 06:12 )    Color: x / Appearance: x / SG: x / pH: x  Gluc: 170 mg/dL / Ketone: x  / Bili: x / Urobili: x   Blood: x / Protein: x / Nitrite: x   Leuk Esterase: x / RBC: x / WBC x   Sq Epi: x / Non Sq Epi: x / Bacteria: x        MICROBIOLOGY:  v  .Stool  12-01-24   No enteric pathogens to date: Final culture pending  --  --      .Blood BLOOD  11-26-24   No growth at 5 days  --  --      .Blood BLOOD  11-26-24   No growth at 5 days  --  --      Bronchial  11-24-24   Few Candida glabrata  --  Candida (Torulopsis) glabrata      .Blood BLOOD  11-24-24   No growth at 5 days  --  --      .Blood BLOOD  11-24-24   No growth at 5 days  --  --      Combi-Cath  11-23-24   Commensal charity consistent with body site  --    Moderate polymorphonuclear leukocytes per low power field  No squamous epithelial cells per low power field  No organisms seen      .Blood BLOOD  11-22-24   No growth at 5 days  --  --      .Blood BLOOD  11-20-24   No growth at 5 days  --  --      .Blood BLOOD  11-20-24   No growth at 5 days  --  --      Body Fluid  11-15-24   No growth at 5 days  --    No polymorphonuclear leukocytes seen  No organisms seen  by cytocentrifuge        Toxoplasma IgG Screen: 78.00 IU/mL (11-15-24 @ 00:41)  CMV IgG Antibody: 1.50 U/mL (11-15-24 @ 00:41)    CMVPCR Log: Det <1.54 Assay Dynamic Range: 34.5 to 1.0E+07 IU/mL (1.54 to 7.00 Log10 IU/mL)  Assay lower limit of quantification (LLOQ) is 34.5 IU/mL (1.54 Log10  IU/mL)  CMV DNA detected below the LLOQ will be reported as Detected < 34.5 IU/mL  (<1.54 Log10 IU/mL)  Nydia Cytomegalovirus (CMV) is an FDA-cleared quantitative test that  enables the detection and quantitation of CMV DNA in EDTA plasma of  infected transplant patients on the nydia 8800 system. This test was  verified by [facility]. Results should be interpreted  with consideration of all clinical findings and laboratory findings and  should not form the sole basis for a diagnosis or treatment decision. Fnl75DY/mL (12-03 @ 06:11)  CMVPCR Log: Det <1.54 Assay Dynamic Range: 34.5 to 1.0E+07 IU/mL (1.54 to 7.00 Log10 IU/mL)  Assay lower limit of quantification (LLOQ) is 34.5 IU/mL (1.54 Log10  IU/mL)  CMV DNA detected below the LLOQ will be reported as Detected < 34.5 IU/mL  (<1.54 Log10 IU/mL)  Nydia Cytomegalovirus (CMV) is an FDA-cleared quantitative test that  enables the detection and quantitation of CMV DNA in EDTA plasma of  infected transplant patients on the nydia 8800 system. This test was  verified by [facility]. Results should be interpreted  with consideration of all clinical findings and laboratory findings and  should not form the sole basis for a diagnosis or treatment decision. Jpu74XB/mL (12-02 @ 18:19)    Clostridium difficile GDH Toxins A&amp;B, EIA:   Negative (12-01-24 @ 08:25)  Clostridium difficile GDH Interpretation: Negative for toxigenic C. Difficile.  This specimen is negative for C.  Difficile glutamate dehydrogenase (GDH) antigen and negative for C.  Difficile Toxins A & B, by EIA.  GDH is a highly sensitive screening  marker for C. Difficile that is produced in large amounts by all C.  Difficile strains, both toxigenic and nontoxigenic.  This assay has not  been validated as a test of cure.  Repeat testing during the same episode  of diarrhea is of limited value and is discouraged.  The results of this  assay should always be interpreted in conjunction with patient's clinical  history. (12-01-24 @ 08:25)      RADIOLOGY:    <The imaging below has been reviewed and visualized by me independently. Findings as detailed in report below> Follow Up:  fever    Interval History: afebrile. no acute events.     REVIEW OF SYSTEMS  [  ] ROS unobtainable because:    [ x ] All other systems negative except as noted below    Constitutional:  [ ] fever [ ] chills  [ ] weight loss  [ ] weakness  Skin:  [ ] rash [ ] phlebitis	  Eyes: [ ] icterus [ ] pain  [ ] discharge	  ENMT: [ ] sore throat  [ ] thrush [ ] ulcers [ ] exudates  Respiratory: [ ] dyspnea [ ] hemoptysis [ ] cough [ ] sputum	  Cardiovascular:  [ ] chest pain [ ] palpitations [ ] edema	  Gastrointestinal:  [ ] nausea [ ] vomiting [ ] diarrhea [ ] constipation [ ] pain	  Genitourinary:  [ ] dysuria [ ] frequency [ ] hematuria [ ] discharge [ ] flank pain  [ ] incontinence  Musculoskeletal:  [ ] myalgias [ ] arthralgias [ ] arthritis  [ ] back pain  Neurological:  [ ] headache [ ] seizures  [ ] confusion/altered mental status    Allergies  No Known Allergies        ANTIMICROBIALS:  atovaquone  Suspension 1500 daily  valGANciclovir 50 mG/mL Oral Solution 900 daily      OTHER MEDS:  MEDICATIONS  (STANDING):  apixaban 2.5 every 12 hours  aspirin  chewable 81 daily  buDESOnide    Inhalation Suspension 0.5 two times a day  cycloSPORINE  , modified (GENGRAF) Solution 75 <User Schedule>  cycloSPORINE  , modified (GENGRAF) Solution 50 <User Schedule>  digoxin  Injectable 125 daily  doxazosin 2 at bedtime  levETIRAcetam  IVPB 250 every 12 hours  LORazepam   Injectable 0.5 every 8 hours PRN  metoprolol tartrate 12.5 every 12 hours  mycophenolate mofetil IVPB 500 <User Schedule>  oxyCODONE    Solution 2.5 every 4 hours PRN  oxyCODONE    Solution 5 every 4 hours PRN  pantoprazole  Injectable 40 daily  predniSONE   Tablet 20 daily      Vital Signs Last 24 Hrs  T(C): 36.2 (03 Dec 2024 15:00), Max: 37.3 (03 Dec 2024 11:00)  T(F): 97.2 (03 Dec 2024 15:00), Max: 99.1 (03 Dec 2024 11:00)  HR: 102 (03 Dec 2024 15:36) (84 - 124)  BP: 108/72 (03 Dec 2024 15:00) (108/72 - 145/62)  BP(mean): 87 (03 Dec 2024 15:00) (82 - 104)  RR: 24 (03 Dec 2024 15:36) (10 - 28)  SpO2: 99% (03 Dec 2024 15:36) (96% - 100%)    Parameters below as of 03 Dec 2024 15:36  Patient On (Oxygen Delivery Method): room air      PHYSICAL EXAMINATION:  General: Alert and Awake, NAD  Cardiac: RRR, No M/R/G  Resp: CTAB, No Wh/Rh/Ra  Abdomen: NBS, NT/ND, No HSM, No rigidity or guarding  MSK: No LE edema. No Calf tenderness  Skin: No rashes or lesions. Skin is warm and dry to the touch.   Neuro: Alert and Awake. CN 2-12 Grossly intact. Moves all four extremities spontaneously.  Psych: Encephalopathic - unable to assess                            9.8    12.32 )-----------( 188      ( 03 Dec 2024 06:11 )             33.8       12-03    142  |  111[H]  |  52[H]  ----------------------------<  170[H]  3.5   |  16[L]  |  1.25    Ca    7.1[L]      03 Dec 2024 06:12  Phos  3.2     12-03  Mg     2.2     12-03    TPro  3.8[L]  /  Alb  2.1[L]  /  TBili  0.6  /  DBili  x   /  AST  17  /  ALT  28  /  AlkPhos  129[H]  12-03      Urinalysis Basic - ( 03 Dec 2024 06:12 )    Color: x / Appearance: x / SG: x / pH: x  Gluc: 170 mg/dL / Ketone: x  / Bili: x / Urobili: x   Blood: x / Protein: x / Nitrite: x   Leuk Esterase: x / RBC: x / WBC x   Sq Epi: x / Non Sq Epi: x / Bacteria: x        MICROBIOLOGY:  v  .Stool  12-01-24   No enteric pathogens to date: Final culture pending  --  --      .Blood BLOOD  11-26-24   No growth at 5 days  --  --      .Blood BLOOD  11-26-24   No growth at 5 days  --  --      Bronchial  11-24-24   Few Candida glabrata  --  Candida (Torulopsis) glabrata      .Blood BLOOD  11-24-24   No growth at 5 days  --  --      .Blood BLOOD  11-24-24   No growth at 5 days  --  --      Combi-Cath  11-23-24   Commensal charity consistent with body site  --    Moderate polymorphonuclear leukocytes per low power field  No squamous epithelial cells per low power field  No organisms seen      .Blood BLOOD  11-22-24   No growth at 5 days  --  --      .Blood BLOOD  11-20-24   No growth at 5 days  --  --      .Blood BLOOD  11-20-24   No growth at 5 days  --  --      Body Fluid  11-15-24   No growth at 5 days  --    No polymorphonuclear leukocytes seen  No organisms seen  by cytocentrifuge        Toxoplasma IgG Screen: 78.00 IU/mL (11-15-24 @ 00:41)  CMV IgG Antibody: 1.50 U/mL (11-15-24 @ 00:41)    CMVPCR Log: Det <1.54 Assay Dynamic Range: 34.5 to 1.0E+07 IU/mL (1.54 to 7.00 Log10 IU/mL)  Assay lower limit of quantification (LLOQ) is 34.5 IU/mL (1.54 Log10  IU/mL)  CMV DNA detected below the LLOQ will be reported as Detected < 34.5 IU/mL  (<1.54 Log10 IU/mL)  Nydia Cytomegalovirus (CMV) is an FDA-cleared quantitative test that  enables the detection and quantitation of CMV DNA in EDTA plasma of  infected transplant patients on the nydia 8800 system. This test was  verified by [facility]. Results should be interpreted  with consideration of all clinical findings and laboratory findings and  should not form the sole basis for a diagnosis or treatment decision. Smq43WR/mL (12-03 @ 06:11)  CMVPCR Log: Det <1.54 Assay Dynamic Range: 34.5 to 1.0E+07 IU/mL (1.54 to 7.00 Log10 IU/mL)  Assay lower limit of quantification (LLOQ) is 34.5 IU/mL (1.54 Log10  IU/mL)  CMV DNA detected below the LLOQ will be reported as Detected < 34.5 IU/mL  (<1.54 Log10 IU/mL)  Nydia Cytomegalovirus (CMV) is an FDA-cleared quantitative test that  enables the detection and quantitation of CMV DNA in EDTA plasma of  infected transplant patients on the nydia 8800 system. This test was  verified by [facility]. Results should be interpreted  with consideration of all clinical findings and laboratory findings and  should not form the sole basis for a diagnosis or treatment decision. Cop43KE/mL (12-02 @ 18:19)    Clostridium difficile GDH Toxins A&amp;B, EIA:   Negative (12-01-24 @ 08:25)  Clostridium difficile GDH Interpretation: Negative for toxigenic C. Difficile.  This specimen is negative for C.  Difficile glutamate dehydrogenase (GDH) antigen and negative for C.  Difficile Toxins A & B, by EIA.  GDH is a highly sensitive screening  marker for C. Difficile that is produced in large amounts by all C.  Difficile strains, both toxigenic and nontoxigenic.  This assay has not  been validated as a test of cure.  Repeat testing during the same episode  of diarrhea is of limited value and is discouraged.  The results of this  assay should always be interpreted in conjunction with patient's clinical  history. (12-01-24 @ 08:25)      RADIOLOGY:    <The imaging below has been reviewed and visualized by me independently. Findings as detailed in report below>    < from: [facility] Duplex Upper Ext Vein Scan, Left (12.03.24 @ 12:41) >  IMPRESSION:    On this examination, DVT affects a left brachial vein.    Superficial thrombophlebitis affects both the left basilic and cephalic   veins.    < end of copied text >

## 2024-12-03 NOTE — SWALLOW FEES ASSESSMENT ADULT - UNSUCCESSFUL STRATEGIES TRIALED DURING PROCEDURE
did not consistently improve airway protection/hard swallow/productive volitional cough following clinician cue

## 2024-12-03 NOTE — PROGRESS NOTE ADULT - ASSESSMENT
Impression:    Sacral/bilateral Buttocks deep tissue injury in evolution  Incontinence of bowel and bladder  Incontinence Dermatitis    Recommend:  1.) topical therapy: sacral/buttock wound – cleanse with incontinence cleanser, pat dry, apply cavilon advanced skin protectant, cover with an allevyn Life foam dressing daily  2.) Incontinence Management - incontinence cleanser, pads, pericare BID  3.) Maintain on an alternating air with low air loss surface  4.) Turn and reposition Q 2 hours  5.) Nutrition optimization - please add Alfredo  6.) Offload heels/feet with complete cair air fluidized boots; ensure that the soles of the feet are not resting on the foot board of the bed.  7.) chair cushion for chair sitting  8.) Glycemic control    Care as per medicine. Will not actively follow but will remain available. Please recall for new issues or deterioration.  Upon discharge f/u as outpatient [facility] [address] [place] [address] [phone]  Thank you for this consult  JOSE CarvalhoC, CWOCN via TEAMS    Nights/ Weekends/ Holidays please call:  General Surgery Consult pager [phone]) for emergencies

## 2024-12-03 NOTE — SWALLOW FEES ASSESSMENT ADULT - LARYNGEAL PENETRATION DURING SWALLOW - SILENT
over laryngeal surface of epiglottis, a-e folds and arytenoids; incomplete retrieval/Trace over a-e folds and arytenoids; retrieved/Trace over laryngeal surface of epiglottis, over a-e folds and arytenoids; incomplete retrieval/Trace shallow over a-e folds and arytenoids; retrieved/Trace

## 2024-12-03 NOTE — SWALLOW FEES ASSESSMENT ADULT - ROSENBEK'S PENETRATION ASPIRATION SCALE
(2) material enters airway, remains above the vocal cords, no residue remains (penetration) (3) material remains above the vocal cords, visible residue remains (penetration) (4) material contacts vocal cords, no residue remains (penetration)

## 2024-12-03 NOTE — PROGRESS NOTE ADULT - ATTENDING COMMENTS
74 yo m, HTN, DM, MASH, HCC, s/p OLT.  N Multimodal pain management. Continue Seroquel 50 qhs.  P RA sat >90.   C Off pressors.   G NGT out, swallow eval. +BM.  R Net +1.8L, . Cr 1.2(1.48).  H Hgb 9.8. Trend CBC.  DVT Eliquis, SCDs.  I Monitor atovaquone and Valcyte.  IS prednisone 20,  bid, cyclosporine.  E Monitor glycemia. ISS.

## 2024-12-03 NOTE — PROGRESS NOTE ADULT - ASSESSMENT
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 Post-op course c/b worsening mental status and re-intubation 11/20 for acute hypoxemic respiratory failure 2/2 aspiration, extubated 11/24 and re-intubated 11/24. Pt then extubated 11/26, now on nasal cannula.  Colonic ileus s/p several rounds of Relistor and Neostigmine, rectal tube, now with improvement.  ?Steroid Psychosis, now improving, minimal narcotics/Ativan requirements.    PLAN:  Neuro:  - Off sedation   - pain control w/ Tylenol 500mg q6, oycodone 5/10 prn   - takes xanax 2mg TID at home, s/p Benzo taper  - CT head 11/19 and 11/21 negative  - CT head 11/25 - negative   - CT head 11/29 - negative   - EEG: Mild diffuse cerebral dysfunction that is not specific in etiology. No epileptic discharges recorded. No seizures recorded.  - Keppra: 250 BID   - Seroquel 50mg HS,12.5 BID prn   - Ativan 0.5mg q8h PRN for agitation  - Holding home xanax, Abilify, Zoloft, Remeron, Lexapro     Resp:  - Room air  - CT chest 11/25: Groundglass opacities in the right lower and left upper lobes, possibly infectious in nature.  - budesonide, duonebs, hypertonic saline    CV:  - goal MAP > 65 mmHg  - lactate clear  - Metoprolol 12.5 BID  - Dig 125 qd  - Dig level (11/26) - 0.9    GI:   - NPO w/ NGT (pulled out)  - Ileus  - Relistor   - Protonix for stress ulcer prophylaxis  - ALYSSA x1, monitor output  - post-op liver doppler w/ patent vasculature   - Liver doppler 11/29: Portal venous gas visualized with c/f mesenteric ischemia.   - CT abd/pel 11/29: Dilated small and large bowel consistent with an ileus. No evidence of portal venous gas as questioned on liver Doppler.  - F/u GI recs   - Red rubber rectal tube placed 11/29  - FEES 12/3    Renal:  - Monitor I&Os and electrolytes w/ repletions as necessary  - D5 1/2 NS @ 50cc/hr  - cardura    Heme:  - Monitor CBC and coags  - Eliquis 2.5 BID  - ASA PO  - RUE duplex neg 11/24  - LUE duplex 11/27 superficial thrombophlebitis, neg DVT    ID:   - Transplant ppx fluconazole, therapeutic valcyte, mepron  - immunosuppression w/ steroids, cyclosporine  - Blood cx 11/20 NGTD, 11/22 NGTD, 11/24 NGTD  - BAL 11/24 - candida glabrata (BAL fungitell >500)   - Serum fungitell neg  - Procal 0.21 -> 0.18 --> 0.32  - f/u whether to restart jeniffer?    Endo:   - Monitor glucose   - NPH 8u q6   - moderate ISS  - post-transplant steroid taper     Lines:   - L radial A line (11/24 - )  - midline LUE (11/19 - )  - PIVs  - NGT  - JPs x1

## 2024-12-03 NOTE — SWALLOW FEES ASSESSMENT ADULT - COMMENTS
small glottal gap  small areas of erythema    Bedside swallow evaluation completed 12/2. C/f oropharyngeal dysphagia. Multiple swallows and wet vocal quality on po trials. npo/ngt and FEES recommended. Planned for 12/3. Large bore NGT pulled prior to FEES. Per SICU note on 11/30: New facial droop and LUE rigidity per family, CTH negative  CT abd/pel: Dilated small and large bowel consistent with an ileus. No evidence of portal venous gas as questioned on liver Doppler - F/u GI recs  CT chest 11/25: Groundglass opacities in the right lower and left upper lobes, possibly infectious in nature. Per SICU note on 11/30: New facial droop and LUE rigidity per family, CTH negative  CT abd/pel: Dilated small and large bowel consistent with an ileus. No evidence of portal venous gas as questioned on liver Doppler - F/u GI recs  CT chest 11/25: Ground glass opacities in the right lower and left upper lobes, possibly infectious in nature.  CXR 12/3: Diffuse mild dilation of the colon      Bedside swallow evaluation completed 12/2. C/f oropharyngeal dysphagia. Multiple swallows and wet vocal quality on po trials. npo/ngt and FEES recommended. Planned for 12/3. Large bore NGT pulled prior to FEES.  As per d/w JAZZ Kessler 12/3, patient is cleared from GI perspective for po intake during FEES exam. small glottal gap  small areas of erythema; posterior granulomas

## 2024-12-03 NOTE — SWALLOW FEES ASSESSMENT ADULT - DIAGNOSTIC IMPRESSIONS
75 yo M presents with 1)a pharyngeal dysphagia and 2) mild dysphonia. Oral management is adequate across textures trialed. Pharyngeal trigger is delayed, initiated deep in the hypopharynx. There is laryngeal penetration with incomplete retrieval on solids, mildly thick liquids, and ice chips. Trace material descends to vocal cords; cued cough does not fully clear material from laryngeal vestibule. There is moderate pharyngeal residue on solids with additional trace penetration during multiple spontaneous repeat swallows. Shallow laryngeal penetration is noted for purees, minced moist and moderately thick liquids; no progression noted; ultimately cleared on spontaneous repeat swallows. Vocal quality is hoarse. B/l vocal cords are mobile; small areas of erythema and posterior granulation, likely intubation related; small glottal gap.    Disorders: reduced BOT retraction; reduced pharyngeal contractility; delayed pharyngeal trigger; reduced airway closure; reduced supraglottic sensation

## 2024-12-03 NOTE — SWALLOW FEES ASSESSMENT ADULT - NS SWALLOW FEES REC ASPIR MON
Monitor for s/s aspiration/laryngeal penetration. If noted:  D/C p.o. intake, provide non-oral nutrition/hydration/meds, and contact this service @ [phone]/change of breathing pattern/cough/gurgly voice/fever/pneumonia/throat clearing/upper respiratory infection

## 2024-12-03 NOTE — PROGRESS NOTE ADULT - SUBJECTIVE AND OBJECTIVE BOX
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    Request for wound care evaluation of the sacrum and bilateral buttocks received from nursing. Mr. Davison was encountered on an air fluidization simulation surface. He was seen with his clinical nurse.  His Son, who also is a physician and visualized the skin in question,  was also present at the request of the patient. He is incontinent of stool and urine. His immobility, inactivity, incontinence of bowel and bladder in addition to poor nutritional status all contribute to his risk of pressure injury development and hinder healing.  Principles of pressure injury prevention including but not limited to turning and repositioning and heel offloading with complete cair heel boots were reviewed with patient and his son.     PAST MEDICAL & SURGICAL HISTORY:  Diabetes  Transaminitis  Paroxysmal atrial fibrillation  Depression  BPH (benign prostatic hyperplasia)  Hypertension  Chronic atrial fibrillation  Coronary artery disease  Hepatocellular carcinoma  DM (diabetes mellitus)  HTN (hypertension)  Paroxysmal atrial fibrillation  Cirrhosis  HCC (hepatocellular carcinoma)  History of BPH  History of laparoscopic cholecystectomy  History of lumbar laminectomy  H/O prior ablation treatment  H/O percutaneous left heart catheterization    REVIEW OF SYSTEMS  CONSTITUTIONAL: no weakness, no fevers or chills  EYES/ENT: No visual changes;  No vertigo or throat pain   MOUTH: No oral lesion, moist  NECK: No pain or stiffness  RESPIRATORY: No cough, wheezing, hemoptysis; No shortness of breath  CARDIOVASCULAR: No chest pain or palpitations  GASTROINTESTINAL: No abdominal or epigastric pain. No nausea, vomiting, or hematemesis  GENITOURINARY: No dysuria, frequency or hematuria  NEUROLOGICAL: + lethargic  SKIN: patches of skin tears and healing unroofed blisters  PSYCH: +anxiety    MEDICATIONS  (STANDING):  apixaban 2.5 milliGRAM(s) Oral every 12 hours  aspirin  chewable 81 milliGRAM(s) Oral daily  atovaquone  Suspension 1500 milliGRAM(s) Oral daily  buDESOnide    Inhalation Suspension 0.5 milliGRAM(s) Inhalation two times a day  chlorhexidine 2% Cloths 1 Application(s) Topical <User Schedule>  cycloSPORINE  , modified (GENGRAF) Solution 75 milliGRAM(s) Oral <User Schedule>  cycloSPORINE  , modified (GENGRAF) Solution 50 milliGRAM(s) Oral <User Schedule>  digoxin  Injectable 125 MICROGram(s) IV Push daily  doxazosin 2 milliGRAM(s) Oral at bedtime  levETIRAcetam  IVPB 250 milliGRAM(s) IV Intermittent every 12 hours  lidocaine   4% Patch 1 Patch Transdermal every 24 hours  metoprolol tartrate 12.5 milliGRAM(s) Oral every 12 hours  mycophenolate mofetil IVPB 500 milliGRAM(s) IV Intermittent <User Schedule>  nystatin Powder 1 Application(s) Topical three times a day  pantoprazole  Injectable 40 milliGRAM(s) IV Push daily  predniSONE   Tablet 20 milliGRAM(s) Oral daily  sodium bicarbonate 1300 milliGRAM(s) Oral two times a day  valGANciclovir 50 mG/mL Oral Solution 900 milliGRAM(s) Oral daily    MEDICATIONS  (PRN):  bacitracin   Ointment 1 Application(s) Topical two times a day PRN abrasions  LORazepam   Injectable 0.5 milliGRAM(s) IV Push every 8 hours PRN Agitation  oxyCODONE    Solution 2.5 milliGRAM(s) Oral every 4 hours PRN Moderate Pain (4 - 6)  oxyCODONE    Solution 5 milliGRAM(s) Oral every 4 hours PRN Severe Pain (7 - 10)    Allergies    No Known Allergies    Intolerances    Vital Signs Last 24 Hrs  T(C): 37.3 (03 Dec 2024 11:00), Max: 37.3 (03 Dec 2024 11:00)  T(F): 99.1 (03 Dec 2024 11:00), Max: 99.1 (03 Dec 2024 11:00)  HR: 124 (03 Dec 2024 12:00) (84 - 124)  BP: 144/77 (03 Dec 2024 12:00) (112/64 - 145/62)  BP(mean): 102 (03 Dec 2024 12:00) (82 - 104)  RR: 16 (03 Dec 2024 12:00) (10 - 28)  SpO2: 99% (03 Dec 2024 12:00) (96% - 100%)    Parameters below as of 03 Dec 2024 11:00  Patient On (Oxygen Delivery Method): room air    Physical Exam:  General: lethargic  Ophthamology: sclera clear  ENMT: moist mucous membranes, trachea midline  Respiratory: equal chest rise with respirations  Gastrointestinal: soft NT/ND  Neurology: nonverbal, not following commands  Psych: calm, cooperative  Musculoskeletal: no contractures  Vascular: BLE edema equal  Skin:  Sacrum/bilateral buttocks with central superficially denuded skin with a perimeter of deep maroon discolored remnants,  L 8cm x 5cm x 0.1cm, scant serosanguinous drainage, periwound is macerated, mildly erythematous  Scrotum with superficially denuded skin with bright red erythema, weeping serous drainage, edematous, TTP  No odor, increased warmth, induration, fluctuance    LABS:  12-03    142  |  111[H]  |  52[H]  ----------------------------<  170[H]  3.5   |  16[L]  |  1.25    Ca    7.1[L]      03 Dec 2024 06:12  Phos  3.2     12-03  Mg     2.2     12-03    TPro  3.8[L]  /  Alb  2.1[L]  /  TBili  0.6  /  DBili  x   /  AST  17  /  ALT  28  /  AlkPhos  129[H]  12-03                          9.8    12.32 )-----------( 188      ( 03 Dec 2024 06:11 )             33.8     PT/INR - ( 03 Dec 2024 06:12 )   PT: 13.6 sec;   INR: 1.20 ratio         PTT - ( 03 Dec 2024 06:12 )  PTT:27.7 sec  Urinalysis Basic - ( 03 Dec 2024 06:12 )    Color: x / Appearance: x / SG: x / pH: x  Gluc: 170 mg/dL / Ketone: x  / Bili: x / Urobili: x   Blood: x / Protein: x / Nitrite: x   Leuk Esterase: x / RBC: x / WBC x   Sq Epi: x / Non Sq Epi: x / Bacteria: x

## 2024-12-03 NOTE — PROGRESS NOTE ADULT - SUBJECTIVE AND OBJECTIVE BOX
SICU Daily Progress Note  =====================================================  Interval/Overnight Events:       24 HOUR EVENTS:   AM rounds:  -Lasix 40mg x1  -Seroquel 50mg qhs, 12.5mg TID PRN for anxiety per psych rec  -Daily abdominal xrays  -DC'd heparin subq --> eliquis 2.5 BID  -FEES tomorrow    PM rounds:  - pulled out NGT  - started D5 1/2 NS @ 50  - per TP okay to hold off and try S&S in AM  - missing dose of cyclosporine but okay per transplant    Allergies: No Known Allergies      MEDICATIONS:   --------------------------------------------------------------------------------------  Neurologic Medications  levETIRAcetam  IVPB 250 milliGRAM(s) IV Intermittent every 12 hours  LORazepam   Injectable 0.5 milliGRAM(s) IV Push every 8 hours PRN Agitation  QUEtiapine 50 milliGRAM(s) Oral at bedtime  QUEtiapine 12.5 milliGRAM(s) Oral every 8 hours PRN Anxiety    Respiratory Medications  buDESOnide    Inhalation Suspension 0.5 milliGRAM(s) Inhalation two times a day    Cardiovascular Medications  digoxin  Injectable 125 MICROGram(s) IV Push daily  doxazosin 2 milliGRAM(s) Oral at bedtime  metoprolol tartrate 12.5 milliGRAM(s) Oral every 12 hours    Gastrointestinal Medications  pantoprazole  Injectable 40 milliGRAM(s) IV Push daily  sodium chloride 0.45%. 1000 milliLiter(s) IV Continuous <Continuous>    Genitourinary Medications    Hematologic/Oncologic Medications  apixaban 2.5 milliGRAM(s) Enteral Tube every 12 hours  aspirin  chewable 81 milliGRAM(s) Enteral Tube daily  cycloSPORINE  , modified (GENGRAF) Solution 75 milliGRAM(s) Oral <User Schedule>  cycloSPORINE  , modified (GENGRAF) Solution 50 milliGRAM(s) Oral <User Schedule>  mycophenolate mofetil IVPB 500 milliGRAM(s) IV Intermittent <User Schedule>    Antimicrobial/Immunologic Medications  atovaquone  Suspension 1500 milliGRAM(s) Oral daily  fluconAZOLE IVPB 400 milliGRAM(s) IV Intermittent every 24 hours  valGANciclovir 50 mG/mL Oral Solution 900 milliGRAM(s) Oral daily    Endocrine/Metabolic Medications  insulin lispro (ADMELOG) corrective regimen sliding scale   SubCutaneous every 6 hours  predniSONE  Solution 20 milliGRAM(s) Oral every 24 hours    Topical/Other Medications  bacitracin   Ointment 1 Application(s) Topical two times a day PRN abrasions  chlorhexidine 2% Cloths 1 Application(s) Topical <User Schedule>  lidocaine   4% Patch 1 Patch Transdermal every 24 hours  nystatin Powder 1 Application(s) Topical three times a day    --------------------------------------------------------------------------------------    VITAL SIGNS, INS/OUTS (last 24 hours):  --------------------------------------------------------------------------------------  T(C): 37 (12-02-24 @ 19:00), Max: 37 (12-02-24 @ 19:00)  HR: 97 (12-02-24 @ 22:00) (84 - 117)  BP: 133/69 (12-02-24 @ 22:00) (107/53 - 141/60)  RR: 16 (12-02-24 @ 22:00) (10 - 25)  SpO2: 99% (12-02-24 @ 22:00) (96% - 100%)    12-01-24 @ 07:01  -  12-02-24 @ 07:00  --------------------------------------------------------  IN: 4280 mL / OUT: 1225 mL / NET: 3055 mL    12-02-24 @ 07:01  -  12-03-24 @ 00:09  --------------------------------------------------------  IN: 2013 mL / OUT: 200 mL / NET: 1813 mL      --------------------------------------------------------------------------------------    EXAM  NEUROLOGY  Exam: Normal, NAD, alert, oriented x3, no focal deficits.    HEENT  Exam: Normocephalic, atraumatic, EOMI.     RESPIRATORY  Exam: Normal expansion/effort.    CARDIOVASCULAR  Exam: Regular rate and rhythm.       GI/NUTRITION  Exam: Abdomen soft, Non-tender, Non-distended.     VASCULAR  Exam: Extremities warm, pink, well-perfused.     MUSCULOSKELETAL  Exam: All extremities moving spontaneously without limitations.     SKIN  Exam: Good skin turgor, no skin breakdown.       LABS  --------------------------------------------------------------------------------------                        9.4    11.91 )-----------( 170      ( 02 Dec 2024 05:05 )             31.5   12-02    144  |  115[H]  |  59[H]  ----------------------------<  127[H]  3.9   |  18[L]  |  1.48[H]    Ca    7.0[L]      02 Dec 2024 18:19  Phos  3.7     12-02  Mg     2.4     12-02    TPro  3.8[L]  /  Alb  2.2[L]  /  TBili  0.6  /  DBili  x   /  AST  18  /  ALT  31  /  AlkPhos  137[H]  12-02  Urinalysis Basic - ( 02 Dec 2024 18:19 )    Color: x / Appearance: x / SG: x / pH: x  Gluc: 127 mg/dL / Ketone: x  / Bili: x / Urobili: x   Blood: x / Protein: x / Nitrite: x   Leuk Esterase: x / RBC: x / WBC x   Sq Epi: x / Non Sq Epi: x / Bacteria: x    PT/INR - ( 02 Dec 2024 05:05 )   PT: 12.9 sec;   INR: 1.12 ratio         PTT - ( 02 Dec 2024 05:05 )  PTT:26.8 sec  --------------------------------------------------------------------------------------

## 2024-12-03 NOTE — PROGRESS NOTE ADULT - NS ATTEND AMEND GEN_ALL_CORE FT
Patient seen and examined. Plan as outlined.  Improving gradually. LFTs and renal function are good. Bowel function improving slowly.  cyclo, cellcept, pred 20.  PT/OT.

## 2024-12-03 NOTE — SWALLOW FEES ASSESSMENT ADULT - RECOMMENDED FEEDING/EATING TECHNIQUES
medications crushed or whole in applesauce/allow for swallow between intakes/maintain upright posture during/after eating for 30 mins/oral hygiene/position upright (90 degrees)/small sips/bites

## 2024-12-03 NOTE — SWALLOW FEES ASSESSMENT ADULT - SLP PERTINENT HISTORY OF CURRENT PROBLEM
75 y/o male, retired urologist, with PMHx of HTN, DM, AFib, BPH, MASH cirrhosis and HCC s/p OLT 11/15/24. Post-op course c/b worsening mental status and re-intubation 11/20 for acute hypoxemic respiratory failure 2/2 aspiration, extubated 11/24; however,re-intubated 11/24. Pt then extubated 11/26. Then pt developed ileus on 11/29, ENT is consulted for NGT placement - s/p [place] sump feeding tube placement via right nostril on 11/30.

## 2024-12-04 ENCOUNTER — RESULT REVIEW (OUTPATIENT)
Age: 74
End: 2024-12-04

## 2024-12-04 NOTE — PROGRESS NOTE ADULT - SUBJECTIVE AND OBJECTIVE BOX
Follow Up:  fever    Interval History: afebrile. no acute events.   WBC normalized, on RA            Vital Signs Last 24 Hrs  T(C): 36.6 (04 Dec 2024 11:00), Max: 36.8 (03 Dec 2024 23:00)  T(F): 97.9 (04 Dec 2024 11:00), Max: 98.2 (03 Dec 2024 23:00)  HR: 119 (04 Dec 2024 11:08) (96 - 126)  BP: 110/55 (04 Dec 2024 11:08) (99/63 - 146/82)  BP(mean): 79 (04 Dec 2024 11:08) (72 - 106)  RR: 25 (04 Dec 2024 11:08) (15 - 28)  SpO2: 97% (04 Dec 2024 11:08) (95% - 100%)    Parameters below as of 04 Dec 2024 11:08  Patient On (Oxygen Delivery Method): room air      PHYSICAL EXAMINATION:  General: Alert and Awake, NAD  Cardiac: RRR, No M/R/G  Resp: CTAB, No Wh/Rh/Ra  Abdomen: NBS, NT/ND, No HSM, No rigidity or guarding  MSK: No LE edema. No Calf tenderness  Skin: No rashes or lesions. Skin is warm and dry to the touch.   Neuro: Alert and Awake. CN 2-12 Grossly intact. Moves all four extremities spontaneously.  Psych: Encephalopathic - unable to assess                   ____________________________________________________  ROS  GENERAL: denies chills, , night sweats, weight loss.   PSYCH: denies depression, anxiety, suicidal ideation, hallucination, and delusions  SKIN: no rash or lesions; no color changes, no abnormal nevi,no  dryness, and nojaundice    EYES: denies visual changes, floaters, pain, inflammation, blurred vision, and discharge  ENT: denies tinnitus, vertigo, epistaxis, oral lesion, and decreased acuity  PULM: denies, hemoptysis, pleurisy  CVS: denies angina, palpitations,+ orthopnea, no syncope, or heart murmur  GI: denies constipation, diarrhea, melena, abdominal pain, nausea.   : denies dysuria, frequency, discharge, incontinence, stones or macroscopic hematuria  MS: no arthralgias, no erythema or swelling, no myalgias, noedema, or lower back pain.   CNS: denies numbness, dizziness, seizure, or tremor  ENDO: denies heat/cold intolerance, polyuria, polydipsia, malaise.    HEME: denies bruising, bleeding, lymphadenopathy, anemia, and calf pain    Allergies  No Known Allergies    __________________________________________________  MEDS:  MEDICATIONS  (STANDING):  apixaban 5 every 12 hours  aspirin  chewable 81 daily  buDESOnide    Inhalation Suspension 0.5 two times a day  cycloSPORINE  , modified (GENGRAF) Solution 75 <User Schedule>  cycloSPORINE  , modified (GENGRAF) Solution 50 <User Schedule>  digoxin  Injectable 125 daily  doxazosin 2 at bedtime  insulin lispro (ADMELOG) corrective regimen sliding scale  three times a day before meals  insulin lispro (ADMELOG) corrective regimen sliding scale  at bedtime  levETIRAcetam  IVPB 250 every 12 hours  LORazepam   Injectable 0.5 every 8 hours PRN  metoprolol tartrate 25 every 8 hours  mycophenolate mofetil Suspension 500 <User Schedule>  pantoprazole  Injectable 40 daily  predniSONE   Tablet 20 daily  QUEtiapine 50 at bedtime    _________________________________________________  ANTIMICOBIALS  atovaquone  Suspension 1500 daily  cycloSPORINE  , modified (GENGRAF) Solution 75 <User Schedule>  cycloSPORINE  , modified (GENGRAF) Solution 50 <User Schedule>  mycophenolate mofetil Suspension 500 <User Schedule>  valGANciclovir 50 mG/mL Oral Solution 900 daily      GENERAL LABS              10.3                 140  | 15   | 51           9.65  >-----------< 162     ------------------------< 255                   33.5                 3.3  | 110  | 1.32                                         Ca 7.2   Mg 2.2   Ph 3.2        Urinalysis Basic - ( 04 Dec 2024 06:12 )    Color: x / Appearance: x / SG: x / pH: x  Gluc: 255 mg/dL / Ketone: x  / Bili: x / Urobili: x   Blood: x / Protein: x / Nitrite: x   Leuk Esterase: x / RBC: x / WBC x   Sq Epi: x / Non Sq Epi: x / Bacteria: x        _________________________________________________  MICROBIOLOGY  -----------    Culture - Stool (collected 01 Dec 2024 08:20)  Source: .Stool  Final Report (03 Dec 2024 17:39):    No enteric pathogens isolated.    (Stool culture examined for Salmonella,    Shigella, Campylobacter, Aeromonas, Plesiomonas,    Vibrio, E.coli O157 and Yersinia)            CMVPCR Log: Det <1.54 Assay Dynamic Range: 34.5 to 1.0E+07 IU/mL (1.54 to 7.00 Log10 IU/mL)  Assay lower limit of quantification (LLOQ) is 34.5 IU/mL (1.54 Log10  IU/mL)  CMV DNA detected below the LLOQ will be reported as Detected < 34.5 IU/mL  (<1.54 Log10 IU/mL)  [facility] Cytomegalovirus (CMV) is an FDA-cleared quantitative test that  enables the detection and quantitation of CMV DNA in EDTA plasma of  infected transplant patients on the nydia 8800 system. This test was  verified by [employer]. Results should be interpreted  with consideration of all clinical findings and laboratory findings and  should not form the sole basis for a diagnosis or treatment decision. Buj08NS/mL (12-03 @ 06:11)  CMVPCR Log: Det <1.54 Assay Dynamic Range: 34.5 to 1.0E+07 IU/mL (1.54 to 7.00 Log10 IU/mL)  Assay lower limit of quantification (LLOQ) is 34.5 IU/mL (1.54 Log10  IU/mL)  CMV DNA detected below the LLOQ will be reported as Detected < 34.5 IU/mL  (<1.54 Log10 IU/mL)  [facility] Cytomegalovirus (CMV) is an FDA-cleared quantitative test that  enables the detection and quantitation of CMV DNA in EDTA plasma of  infected transplant patients on the nydia 8800 system. This test was  verified by [employer]. Results should be interpreted  with consideration of all clinical findings and laboratory findings and  should not form the sole basis for a diagnosis or treatment decision. Naw69PA/mL (12-02 @ 18:19)    Clostridium difficile GDH Toxins A&amp;B, EIA:   Negative (12-01-24 @ 08:25)  Clostridium difficile GDH Interpretation: Negative for toxigenic C. Difficile.  This specimen is negative for C.  Difficile glutamate dehydrogenase (GDH) antigen and negative for C.  Difficile Toxins A & B, by EIA.  GDH is a highly sensitive screening  marker for C. Difficile that is produced in large amounts by all C.  Difficile strains, both toxigenic and nontoxigenic.  This assay has not  been validated as a test of cure.  Repeat testing during the same episode  of diarrhea is of limited value and is discouraged.  The results of this  assay should always be interpreted in conjunction with patient's clinical  history. (12-01-24 @ 08:25)        F[facility]itell:   _______________________________________________  PERTINENT IMAGING

## 2024-12-04 NOTE — PROGRESS NOTE ADULT - SUBJECTIVE AND OBJECTIVE BOX
SICU Daily Progress Note  =====================================================  Interval/Overnight Events:     24 HOUR EVENTS:   -FEES: advanced to minced, moist diet, moderately thick liquids, pills one at a time  -lasix 40mg x1  -DC'd fluids  -DC'd fluconazole  -MOLLY rivaso: left brachial DVT    PM:  - metop 25mg BID   - family requesting volar splint for UE    Allergies: No Known Allergies      MEDICATIONS:   --------------------------------------------------------------------------------------  Neurologic Medications  levETIRAcetam  IVPB 250 milliGRAM(s) IV Intermittent every 12 hours  LORazepam   Injectable 0.5 milliGRAM(s) IV Push every 8 hours PRN Agitation  oxyCODONE    Solution 2.5 milliGRAM(s) Oral every 4 hours PRN Moderate Pain (4 - 6)  oxyCODONE    Solution 5 milliGRAM(s) Oral every 4 hours PRN Severe Pain (7 - 10)    Respiratory Medications  buDESOnide    Inhalation Suspension 0.5 milliGRAM(s) Inhalation two times a day    Cardiovascular Medications  digoxin  Injectable 125 MICROGram(s) IV Push daily  doxazosin 2 milliGRAM(s) Oral at bedtime  metoprolol tartrate 25 milliGRAM(s) Oral every 12 hours    Gastrointestinal Medications  pantoprazole  Injectable 40 milliGRAM(s) IV Push daily  sodium bicarbonate 1300 milliGRAM(s) Oral two times a day    Genitourinary Medications    Hematologic/Oncologic Medications  apixaban 2.5 milliGRAM(s) Oral every 12 hours  aspirin  chewable 81 milliGRAM(s) Oral daily  cycloSPORINE  , modified (GENGRAF) 50 milliGRAM(s) Oral <User Schedule>  cycloSPORINE  , modified (GENGRAF) 75 milliGRAM(s) Oral <User Schedule>  mycophenolate mofetil 500 milliGRAM(s) Oral <User Schedule>    Antimicrobial/Immunologic Medications  atovaquone  Suspension 1500 milliGRAM(s) Oral daily  valGANciclovir 50 mG/mL Oral Solution 900 milliGRAM(s) Oral daily    Endocrine/Metabolic Medications  predniSONE   Tablet 20 milliGRAM(s) Oral daily    Topical/Other Medications  bacitracin   Ointment 1 Application(s) Topical two times a day PRN abrasions  chlorhexidine 2% Cloths 1 Application(s) Topical <User Schedule>  lidocaine   4% Patch 1 Patch Transdermal every 24 hours  nystatin Powder 1 Application(s) Topical three times a day    --------------------------------------------------------------------------------------    VITAL SIGNS, INS/OUTS (last 24 hours):  --------------------------------------------------------------------------------------  T(C): 36.8 (12-03-24 @ 23:00), Max: 37.3 (12-03-24 @ 11:00)  HR: 102 (12-04-24 @ 00:00) (93 - 124)  BP: 102/66 (12-04-24 @ 00:00) (102/66 - 146/82)  RR: 22 (12-04-24 @ 00:00) (15 - 28)  SpO2: 99% (12-04-24 @ 00:00) (96% - 100%)    12-02-24 @ 07:01  -  12-03-24 @ 07:00  --------------------------------------------------------  IN: 2363 mL / OUT: 625 mL / NET: 1738 mL    12-03-24 @ 07:01  -  12-04-24 @ 00:58  --------------------------------------------------------  IN: 933.3 mL / OUT: 600 mL / NET: 333.3 mL      --------------------------------------------------------------------------------------    EXAM  NEUROLOGY  Exam: Normal, NAD, alert, oriented x3, no focal deficits.    HEENT  Exam: Normocephalic, atraumatic, EOMI.     RESPIRATORY  Exam: Normal expansion/effort.    CARDIOVASCULAR  Exam: Regular rate and rhythm.       GI/NUTRITION  Exam: Abdomen soft, Non-tender, Non-distended.     VASCULAR  Exam: Extremities warm, pink, well-perfused.     MUSCULOSKELETAL  Exam: All extremities moving spontaneously without limitations.     SKIN  Exam: Good skin turgor, no skin breakdown.       LABS  --------------------------------------------------------------------------------------                        10.0   11.03 )-----------( 195      ( 03 Dec 2024 18:27 )             33.8   12-03    141  |  112[H]  |  52[H]  ----------------------------<  290[H]  4.0   |  15[L]  |  1.43[H]    Ca    7.3[L]      03 Dec 2024 18:27  Phos  3.3     12-03  Mg     2.2     12-03    TPro  4.1[L]  /  Alb  2.2[L]  /  TBili  0.7  /  DBili  x   /  AST  21  /  ALT  29  /  AlkPhos  133[H]  12-03  Urinalysis Basic - ( 03 Dec 2024 18:27 )    Color: x / Appearance: x / SG: x / pH: x  Gluc: 290 mg/dL / Ketone: x  / Bili: x / Urobili: x   Blood: x / Protein: x / Nitrite: x   Leuk Esterase: x / RBC: x / WBC x   Sq Epi: x / Non Sq Epi: x / Bacteria: x    PT/INR - ( 03 Dec 2024 18:27 )   PT: 13.8 sec;   INR: 1.20 ratio         PTT - ( 03 Dec 2024 18:27 )  PTT:26.6 sec  --------------------------------------------------------------------------------------

## 2024-12-04 NOTE — PROGRESS NOTE ADULT - SUBJECTIVE AND OBJECTIVE BOX
HPI:  Patient seen and examined at bedside in SICU.  Sinus tachycardia while at rest.    Review Of Systems:           Respiratory: No shortness of breath, cough, or wheezing  Cardiovascular: No chest pain or palpitations  10 point review of systems is otherwise negative except as mentioned above        Medications:  apixaban 5 milliGRAM(s) Oral every 12 hours  aspirin  chewable 81 milliGRAM(s) Oral daily  atovaquone  Suspension 1500 milliGRAM(s) Oral daily  bacitracin   Ointment 1 Application(s) Topical two times a day PRN  buDESOnide    Inhalation Suspension 0.5 milliGRAM(s) Inhalation two times a day  chlorhexidine 2% Cloths 1 Application(s) Topical <User Schedule>  cycloSPORINE  , modified (GENGRAF) Solution 75 milliGRAM(s) Oral <User Schedule>  cycloSPORINE  , modified (GENGRAF) Solution 50 milliGRAM(s) Oral <User Schedule>  digoxin  Injectable 125 MICROGram(s) IV Push daily  doxazosin 2 milliGRAM(s) Oral at bedtime  insulin lispro (ADMELOG) corrective regimen sliding scale   SubCutaneous three times a day before meals  insulin lispro (ADMELOG) corrective regimen sliding scale   SubCutaneous at bedtime  levETIRAcetam  IVPB 250 milliGRAM(s) IV Intermittent every 12 hours  lidocaine   4% Patch 1 Patch Transdermal every 24 hours  linaclotide 290 MICROGram(s) Oral daily  LORazepam   Injectable 0.5 milliGRAM(s) IV Push every 8 hours PRN  melatonin 5 milliGRAM(s) Oral at bedtime  metoprolol tartrate 50 milliGRAM(s) Oral every 8 hours  mycophenolate mofetil Suspension 500 milliGRAM(s) Oral <User Schedule>  nystatin Powder 1 Application(s) Topical three times a day  pantoprazole  Injectable 40 milliGRAM(s) IV Push daily  potassium chloride    Tablet ER 40 milliEquivalent(s) Oral once  predniSONE   Tablet 20 milliGRAM(s) Oral daily  QUEtiapine 50 milliGRAM(s) Oral at bedtime  QUEtiapine 12.5 milliGRAM(s) Oral three times a day PRN  sodium bicarbonate 1300 milliGRAM(s) Oral two times a day  valGANciclovir 50 mG/mL Oral Solution 900 milliGRAM(s) Oral daily    PAST MEDICAL & SURGICAL HISTORY:  Diabetes      Transaminitis      Paroxysmal atrial fibrillation      Depression      BPH (benign prostatic hyperplasia)      Hypertension      Chronic atrial fibrillation      Coronary artery disease      Hepatocellular carcinoma      DM (diabetes mellitus)      HTN (hypertension)      Paroxysmal atrial fibrillation      Cirrhosis      HCC (hepatocellular carcinoma)      History of BPH      History of laparoscopic cholecystectomy      History of lumbar laminectomy      H/O prior ablation treatment      H/O percutaneous left heart catheterization        Vitals:  T(C): 36.4 (12-04-24 @ 23:00), Max: 36.6 (12-04-24 @ 11:00)  HR: 124 (12-04-24 @ 23:00) (102 - 126)  BP: 152/97 (12-04-24 @ 23:00) (99/63 - 152/97)  BP(mean): 120 (12-04-24 @ 23:00) (72 - 120)  RR: 36 (12-04-24 @ 23:00) (21 - 36)  SpO2: 96% (12-04-24 @ 23:00) (95% - 100%)  Wt(kg): --  Daily     Daily   I&O's Summary    03 Dec 2024 07:01  -  04 Dec 2024 07:00  --------------------------------------------------------  IN: 1033.3 mL / OUT: 950 mL / NET: 83.3 mL    04 Dec 2024 07:01  -  04 Dec 2024 23:53  --------------------------------------------------------  IN: 756 mL / OUT: 200 mL / NET: 556 mL        Physical Exam:  Appearance: No acute distress; well appearing  Eyes: PERRL, EOMI, pink conjunctiva  HENT: Normal oral mucosa  Cardiovascular: RRR, S1, S2, no murmurs, rubs, or gallops; no edema; no JVD  Respiratory: Clear to auscultation bilaterally  Gastrointestinal: soft, non-tender, non-distended with normal bowel sounds  Musculoskeletal: No clubbing; no joint deformity   Neurologic: Non-focal  Lymphatic: No lymphadenopathy  Psychiatry: AAOx3, mood & affect appropriate  Skin: No rashes, ecchymoses, or cyanosis                          10.8   12.95 )-----------( 195      ( 04 Dec 2024 17:29 )             36.1     12-04    144  |  112[H]  |  56[H]  ----------------------------<  137[H]  3.1[L]   |  18[L]  |  1.56[H]    Ca    7.6[L]      04 Dec 2024 17:29  Phos  2.9     12-04  Mg     2.4     12-04    TPro  4.5[L]  /  Alb  2.6[L]  /  TBili  0.7  /  DBili  x   /  AST  17  /  ALT  30  /  AlkPhos  129[H]  12-04    PT/INR - ( 03 Dec 2024 18:27 )   PT: 13.8 sec;   INR: 1.20 ratio         PTT - ( 03 Dec 2024 18:27 )  PTT:26.6 sec        Cardiovascular Diagnostic Testing:  ECG: sinus rhythm    Echo: < from: Intra-Operative Transesophageal Echo W or WO Ultrasound Enhancing Agent (11.15.24 @ 07:46) >  --------------------------------------------------------------------------------  PRE-BYPASS FINDINGS     Left Ventricle:  Left ventricular ejection fraction is estimated at 60 to 65%. Normal left ventricularwall thickness. The left ventricular systolic function is normal There are no regional wall motion abnormalities seen.     Right Ventricle:  The right ventricular cavity is normal in size, with normal wall thickness and right ventricular systolic function is normal. Right ventricular systolic function is normal.     Left Atrium:  The left atrium is normal in size. No thrombus visualized in left atrial appendage.     Right Atrium:  The right atrium is normal in size. The right atrium is normal in size.     Interatrial Septum:  No PFO visualized with color flow doppler.     Aortic Valve:  The aortic valve appears trileaflet. There is no aortic valve stenosis. There is trace aortic regurgitation.     Mitral Valve:  There is no mitral valve stenosis. There is no mitral valve stenosis. There is trace mitral regurgitation.     Tricuspid Valve:  There is no evidence of tricuspid stenosis. There is trace tricuspid regurgitation.     Pericardium:  No pericardial effusion seen.     Post-Bypass:  S/P OLT. Under current loading conditions, hyperdynamic left ventricular systolic function, EF: 70-75% by visual estimate, no RWMA. Normal right ventricular systolic function. All other findings unchanged. Probe removed atraumatically, no blood. The patient tolerated the procedure well and without complications. Permanent recorded images are stored in the medical record.     Electronically signed by James Villareal on 11/15/2024 at 2:18:16 PM         *** Final ***    < end of copied text >      Stress Testing:     Cath: 4/24/2024, patient had his LHC repeated with Ben Villareal MD at [facility].  Cath showed multivessel coronary artery disease, but the lesions previously noted were FFR negative.  He continues to have MIRTA 3 flow throughout.    Interpretation of Telemetry: Sinus     Imaging:

## 2024-12-04 NOTE — PROGRESS NOTE ADULT - ASSESSMENT
74 year-old with known coronary artery disease as above presents for liver transplant.  Seen to have chest pain post transplant.  It was atypical of angina and has resolved.  Troponin remained negative.    Now with atrial fibrillation with RVR (11/19) - rate control with beta-blocker as needed, now status post Digoxin load  Converted to sinus rhythm on 12/2.    Rate control with betablocker and Digoxin - uptitrate beta-blocker as tolerated  Anticoagulation when cleared by surgery  EP consult and follow-up appreciated

## 2024-12-04 NOTE — PROGRESS NOTE ADULT - ASSESSMENT
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 Pt underwent Y90 Sept, 2023 with favorable treatment response.s/p OLT 11/15/24     # S/p OLT 11/15/24 CMV D?R? EBV , toxo D?/R?  VAlcyte, bactrim ppx  fluconazole ppx    # Fever, sepsis, hypoxic respiratory failure s/p on mechanical ventilation  Ct C/A/P Bilateral lower lobe consolidation with fluid and debris in the right lower lobe bronchi, concerning for aspiration pneumonia.  CMV PCR (11/24) 146 (low level CMV viremia - not likely contributing)  Adenovirus PCR (11/24) Negative  Cryptococcal Serum Ag (11/24) Negative  serum and bronch aspergillus galactomannan negative  WNV Serology and Serum PCR Negative  CT Chest (11/25) Groundglass opacities in the right lower and left upper lobes, possibly infectious in nature.  CT A/P (11/25) No acute process    # ileus  no diarrhea    Recommend  completed Meropenem 1 gram q 8hr (11/23 >11/29).   Repeat CMV PCR on 12/3/24, <34.5, continue CMV PPx  negative bartonella PCR  follow bronchoscopy cultures- so far unrevealing  continue Valcyte 900 mg Q24H  continue fluconazole 400 mg Q24H for antifungal PPx  continue Atovaquone 1,500 mg PO Q24H for PCP PPx  Cdiff, Gi PC negative 12/1/24      Thank you for involving us in the care of this patient  Transplant ID will continue to follow  Please call or page with additional questions  Pager; #[phone]  Teams: from 8 am to 5 pm  Rachele Lewis MD

## 2024-12-04 NOTE — PROGRESS NOTE ADULT - ASSESSMENT
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 Post-op course c/b worsening mental status and re-intubation 11/20 for acute hypoxemic respiratory failure 2/2 aspiration, extubated 11/24 and re-intubated 11/24. Pt then extubated 11/26, now on nasal cannula.  Colonic ileus s/p several rounds of Relistor and Neostigmine, rectal tube, now with improvement.  ?Steroid Psychosis, now improving, minimal narcotics/Ativan requirements.    PLAN:  Neuro:  - Off sedation   - pain control w/ Tylenol 500mg q6, oycodone 5/10 prn   - takes xanax 2mg TID at home, s/p Benzo taper  - CT head 11/19 and 11/21 negative  - CT head 11/25 - negative   - CT head 11/29 - negative   - EEG: Mild diffuse cerebral dysfunction that is not specific in etiology. No epileptic discharges recorded. No seizures recorded.  - Keppra: 250 BID   - Seroquel 50mg HS,12.5 BID prn   - Ativan 0.5mg q8h PRN for agitation  - Holding home xanax, Abilify, Zoloft, Remeron, Lexapro     Resp:  - Room air  - CT chest 11/25: Groundglass opacities in the right lower and left upper lobes, possibly infectious in nature.  - budesonide    CV:  - goal MAP > 65 mmHg  - lactate clear  - Metoprolol 25 BID  - Dig 125 qd  - Dig level (11/26) - 0.9    GI:   - FEES 12/3: Minced and moist diet, moderately thick liquids, crushed pills one at a time  - Ileus  - Protonix for stress ulcer prophylaxis  - ALYSSA x1, monitor output  - post-op liver doppler w/ patent vasculature   - Liver doppler 11/29: Portal venous gas visualized with c/f mesenteric ischemia.   - CT abd/pel 11/29: Dilated small and large bowel consistent with an ileus. No evidence of portal venous gas as questioned on liver Doppler.    Renal:  - Monitor I&Os and electrolytes w/ repletions as necessary  - cardura    Heme:  - Monitor CBC and coags  - Eliquis 2.5 BID  - ASA PO  - LUE duplex 11/27 superficial thrombophlebitis, neg DVT  - LUE duplex 12/3 with L brachial DVT    ID:   - Transplant ppx , therapeutic valcyte, mepron  - immunosuppression w/ steroids, cyclosporine  - Blood cx 11/20 NGTD, 11/22 NGTD, 11/24 NGTD  - BAL 11/24 - candida glabrata (BAL fungitell >500)   - Serum fungitell neg    Endo:   - Monitor glucose   - moderate ISS  - post-transplant steroid taper

## 2024-12-04 NOTE — PROGRESS NOTE ADULT - ASSESSMENT
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 (splenomegaly, recanalized paraumbilical vein, paraoesophageal and tera splenic varices), and with HCC found on 9/11/23 MRI, 1.8 cm seg 5 LR-5 HCC and a 3-4 cm seg 8 LR 4 HCC s/p Y90 Sept, 2023 with favorable treatment response, now s/p OLT on 11/15    [] POD 19 s/p OLT   - good graft function  - Diet: passed FEES, minced/moist diet  - Hypernatremia resolved  - Pain management: avoid necrotics  - Bowel regimen  - Strict I&Os, Daily weights  - lasix PRN  - start bicarb  - LE edema, US: L brachial DVT   - daily PT     [ ] Immunosuppression  - Tacrolimus stopped 11/18 in setting of AMS/Seizure, Started Cyclo 11/24  - Cyclo by level, MMF 500mg bid, Pred 20mg daily   - SIMULECT completed  - PPx: Valcyte, Mepron, Fluc     [] lleus   - start home Linzes  - imaging with distended colon (likely opioid induced)  - s/p Neostigmine x 2  - s/p Relistor, last dose 12/1  - Daily AXR     [] CMV viremia  - CMV  on  11/24  - Repeat CMV PCR 12/2: 34.5  - on Valcyte 900mg daily     [ ] AMS  - improving  - resume seroquel  - Reintubated 11/20 Aspiration/hypoxia, extubated 11/24->[id number]--> extubated 11/26  - EEG 11/30 negative, Neurology following  -  following   - off tacro, now on cyclo  - on keppra    [ ] HTN/ pAFib  - Cont Metoprolol 12.5mg ibed   - cont Digoxin  - Eliquis 5mg bid  - Dr. 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 (splenomegaly, recanalized paraumbilical vein, paraoesophageal and tera splenic varices), and with HCC found on 9/11/23 MRI, 1.8 cm seg 5 LR-5 HCC and a 3-4 cm seg 8 LR 4 HCC s/p Y90 Sept, 2023 with favorable treatment response, now s/p OLT on 11/15    [] POD #20 s/p OLT   - good graft function  - Diet: passed FEES, on minced/moist diet  - Hypernatremia resolved  - Pain management: avoid nacrotics  - Bowel regimen  - Strict I&Os, Daily weights  - lasix PRN  - PO bicarb  - LE edema, US: L brachial DVT   - daily PT     [ ] Immunosuppression  - Tacrolimus stopped 11/18 in setting of AMS/Seizure, Started Cyclo 11/24  - Cyclo by level, MMF 500mg bid, Pred 20mg daily   - SIMULECT completed  - PPx: Valcyte, Mepron, Fluc     [] lleus   - start home Linzess  - imaging with distended colon (likely opioid induced)  - s/p Neostigmine x 2  - s/p Relistor, last dose 12/1  - Daily AXR     [] CMV viremia  - CMV  on  11/24  - Repeat CMV PCR 12/2: 34.5  - on Valcyte 900mg daily     [ ] AMS  - improving  - Seroquel  - Reintubated 11/20 Aspiration/hypoxia, extubated 11/24->[id number]--> extubated 11/26  - EEG 11/30 negative, Neurology following  -  following   - off tacro, now on cyclo  - on keppra    [ ] HTN/ pAFib  - increase BB prn  - cont Digoxin  - Eliquis 5mg bid  - Dr. Quintanilla following  - watch Digoxin levels    [ ] DM  -Lantus/Lispro, adjust prn

## 2024-12-04 NOTE — PROGRESS NOTE ADULT - SUBJECTIVE AND OBJECTIVE BOX
Transplant Surgery - Multidisciplinary Progress Note   --------------------------------------------------------------  OLT   11/15/2024        POD#19     Present:   Patient seen and examined with multidisciplinary Transplant team including Surgeon: Dr. Dagher, Dr. Sorto,  Hepatologist: Dr. Luis, [facility] in [facility] rounds.   Disciplines not in attendance will be notified of the plan.     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     s/p OLT 11/15 with post op course c/b:  ·	Hypoxia: Reintubated 11/20, extubated 11/24->reintubated 11/24, extubated 11/26 (aspiration PNA, steroid psychosis)  ·	Ileus   ·	Fever  ·	A fib  ·	AMS/Seizure     Interval Events:  - afebrile, vss  - passed FEES, diet minced/moist  - LUE US: L brachial DVT  - lasix prn    Immunosuppression:  -Cyclo per level, MMF 500mg BID, pred 20  -ongoing monitoring for signs of rejection  Potential Discharge date: Pending clinical course       MEDICATIONS  (STANDING):  apixaban 5 milliGRAM(s) Oral every 12 hours  aspirin  chewable 81 milliGRAM(s) Oral daily  atovaquone  Suspension 1500 milliGRAM(s) Oral daily  buDESOnide    Inhalation Suspension 0.5 milliGRAM(s) Inhalation two times a day  chlorhexidine 2% Cloths 1 Application(s) Topical <User Schedule>  cycloSPORINE  , modified (GENGRAF) Solution 75 milliGRAM(s) Oral <User Schedule>  cycloSPORINE  , modified (GENGRAF) Solution 50 milliGRAM(s) Oral <User Schedule>  digoxin  Injectable 125 MICROGram(s) IV Push daily  doxazosin 2 milliGRAM(s) Oral at bedtime  insulin lispro (ADMELOG) corrective regimen sliding scale   SubCutaneous three times a day before meals  insulin lispro (ADMELOG) corrective regimen sliding scale   SubCutaneous at bedtime  levETIRAcetam  IVPB 250 milliGRAM(s) IV Intermittent every 12 hours  lidocaine   4% Patch 1 Patch Transdermal every 24 hours  linaclotide 290 MICROGram(s) Oral daily  metoprolol tartrate 50 milliGRAM(s) Oral two times a day  mycophenolate mofetil Suspension 500 milliGRAM(s) Oral <User Schedule>  nystatin Powder 1 Application(s) Topical three times a day  pantoprazole  Injectable 40 milliGRAM(s) IV Push daily  potassium chloride    Tablet ER 40 milliEquivalent(s) Oral once  potassium chloride  10 mEq/100 mL IVPB 10 milliEquivalent(s) IV Intermittent every 1 hour  predniSONE   Tablet 20 milliGRAM(s) Oral daily  QUEtiapine 50 milliGRAM(s) Oral at bedtime  sodium bicarbonate 1300 milliGRAM(s) Oral two times a day  valGANciclovir 50 mG/mL Oral Solution 900 milliGRAM(s) Oral daily    MEDICATIONS  (PRN):  bacitracin   Ointment 1 Application(s) Topical two times a day PRN abrasions  LORazepam   Injectable 0.5 milliGRAM(s) IV Push every 8 hours PRN Agitation  QUEtiapine 12.5 milliGRAM(s) Oral three times a day PRN anxiety      PAST MEDICAL & SURGICAL HISTORY:  Diabetes      Transaminitis      Paroxysmal atrial fibrillation      Depression      BPH (benign prostatic hyperplasia)      Hypertension      Chronic atrial fibrillation      Coronary artery disease      Hepatocellular carcinoma      DM (diabetes mellitus)      HTN (hypertension)      Paroxysmal atrial fibrillation      Cirrhosis      HCC (hepatocellular carcinoma)      History of BPH      History of laparoscopic cholecystectomy      History of lumbar laminectomy      H/O prior ablation treatment      H/O percutaneous left heart catheterization          Vital Signs Last 24 Hrs  T(C): 36.6 (04 Dec 2024 19:00), Max: 36.8 (03 Dec 2024 23:00)  T(F): 97.9 (04 Dec 2024 19:00), Max: 98.2 (03 Dec 2024 23:00)  HR: 109 (04 Dec 2024 20:00) (101 - 126)  BP: 139/70 (04 Dec 2024 20:00) (99/63 - 152/56)  BP(mean): 93 (04 Dec 2024 20:00) (72 - 102)  RR: 25 (04 Dec 2024 20:00) (20 - 29)  SpO2: 98% (04 Dec 2024 20:00) (95% - 100%)    Parameters below as of 04 Dec 2024 19:00  Patient On (Oxygen Delivery Method): room air        I&O's Summary    03 Dec 2024 07:01  -  04 Dec 2024 07:00  --------------------------------------------------------  IN: 1033.3 mL / OUT: 950 mL / NET: 83.3 mL    04 Dec 2024 07:01  -  04 Dec 2024 21:21  --------------------------------------------------------  IN: 756 mL / OUT: 200 mL / NET: 556 mL                              10.8   12.95 )-----------( 195      ( 04 Dec 2024 17:29 )             36.1     12-04    144  |  112[H]  |  56[H]  ----------------------------<  137[H]  3.1[L]   |  18[L]  |  1.56[H]    Ca    7.6[L]      04 Dec 2024 17:29  Phos  2.9     12-04  Mg     2.4     12-04    TPro  4.5[L]  /  Alb  2.6[L]  /  TBili  0.7  /  DBili  x   /  AST  17  /  ALT  30  /  AlkPhos  129[H]  12-04          Culture - Stool (collected 12-01-24 @ 08:20)  Source: .Stool  Final Report (12-03-24 @ 17:39):    No enteric pathogens isolated.    (Stool culture examined for Salmonella,    Shigella, Campylobacter, Aeromonas, Plesiomonas,    Vibrio, E.coli O157 and Yersinia)                Review of systems  All other systems were reviewed and are negative, except as noted.      PHYSICAL EXAM:  Constitutional: NAD  Eyes:  PERRLA  ENMT: nc/at, no thrush   Neck: supple   Respiratory: CTA B/L  Cardiovascular: RRR  Gastrointestinal: softly distended--improving, appropriate incisional TTP. chevron incision c/d/i, staples in place. No signs of infection.   Genitourinary: Voiding,   Extremities: SCD's in place and working bilaterally, + LE edema  Neurological: A&O x3  Skin: no rashes, ulcerations, lesions   Transplant Surgery - Multidisciplinary Progress Note   --------------------------------------------------------------  OLT   11/15/2024        POD#20    Present:   Patient seen and examined with multidisciplinary Transplant team including Surgeon: Dr. Dagher, Dr. Sorto, JAZZ [facility], Hepatologist: Dr. Luis, SICU team in AM rounds.   Disciplines not in attendance will be notified of the plan.     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     s/p OLT 11/15 with post op course c/b:  ·	Hypoxia: Reintubated 11/20, extubated 11/24->reintubated 11/24, extubated 11/26 (aspiration PNA, steroid psychosis)  ·	Ileus   ·	Fever  ·	A fib  ·	AMS/Seizure     Interval Events:  -Afebrile, tachycardia  -good graft function  -having bowel function  -slowly tolerating solids    Immunosuppression:  -Cyclo per level, MMF 500mg BID, pred 20  -ongoing monitoring for signs of rejection  Potential Discharge date: Pending clinical course       TX DATA HERE      Review of systems  All other systems were reviewed and are negative, except as noted.      PHYSICAL EXAM:  Constitutional: NAD  Eyes:  PERRLA  ENMT: nc/at, no thrush   Neck: supple   Respiratory: CTA B/L  Cardiovascular: RRR  Gastrointestinal: softly distended--improving, appropriate incisional TTP. chevron incision c/d/i, staples in place. No signs of infection.   Genitourinary: Voiding,   Extremities: SCD's in place and working bilaterally, + LE edema  Neurological: A&O x3  Skin: no rashes, ulcerations, lesions

## 2024-12-04 NOTE — CHART NOTE - NSCHARTNOTEFT_GEN_A_CORE
Primary team noted patient has had difficulty sleeping overnight last night, patient did not receive previous recommendation of Seroquel at bedtime, d/t to NGT being pulled out, currently patient has passed his FEES and is able to take oral medications. Communicated to ACP from liver team to restart Seroquel 50mg PO HS if qtc < 500 to address sleep quality as well as staring patient on Seroquel 12.5mg PO TID PRN for acute anxiety alleviation if QTC < 500, transplant psychiatry will continue to follow patient.

## 2024-12-04 NOTE — PROGRESS NOTE ADULT - REASON FOR ADMISSION
Liver Transplant Show Aperture Variable?: Yes Post-Care Instructions: I reviewed with the patient in detail post-care instructions. Patient is to wear sunprotection, and avoid picking at any of the treated lesions. Pt may apply Vaseline to crusted or scabbing areas. Render Note In Bullet Format When Appropriate: No Detail Level: Detailed Application Tool (Optional): Liquid Nitrogen Sprayer Consent: The patient's verbal consent was obtained including but not limited to risks of crusting, scabbing, blistering, scarring, darker or lighter pigmentary change, recurrence, incomplete removal and infection. Medical Necessity Clause: This procedure was medically necessary because the lesions that were treated were: Medical Necessity Information: It is in your best interest to select a reason for this procedure from the list below. All of these items fulfill various CMS LCD requirements except the new and changing color options. Spray Paint Text: The liquid nitrogen was applied to the skin utilizing a spray paint frosting technique.

## 2024-12-04 NOTE — PROGRESS NOTE ADULT - ATTENDING COMMENTS
S/p OLT.  N Multimodal pain management. Keppra ppx. Seroquel 50 qhs.  P RA sat >90.   C Metoprolol to 25 tid. Continue digoxin, cardiology following  G Passed swallow eval, minced and moist diet.  R Net +50cc. Cr 1.3, trend UOP, CMP. Continue Cardura.  H Hgb 10.3, continue Eliquis, ASA.  I WBC 9. AF, Valcyte, mepron.   IS Prednisone, MMF, cyclosporine.  E ISS. S/p OLT.  N Multimodal pain management. Keppra ppx. Seroquel 50 qhs.  P RA sat >90.   C Metoprolol to 25 tid. Continue digoxin, cardiology following. TTE pending.  G Passed swallow eval, minced and moist diet.  R Net +50cc. Cr 1.3, trend UOP, CMP. Continue Cardura.  H Hgb 10.3, continue Eliquis, ASA.  I WBC 9. AF, Valcyte, mepron.   IS Prednisone, MMF, cyclosporine.  E ISS.

## 2024-12-05 NOTE — BH CONSULTATION LIAISON PROGRESS NOTE - NSBHASSESSMENTFT_PSY_ALL_CORE
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 Patient seen by transplant psychiatry for concerns of anxiety post transplant. Patient on exam noted a hx of low mood, anxiety secondary to recent health problems 3 months ago, with need for liver transplant, of which he has been followed by Dr. Hobbs outpatient for management for his mood and anxiety. He noted post operatively experiencing symptoms, anxiety, with anxious ruminations, difficulty with sleep as well as  with feelings of guilt regarding organ from decreased donor.  He denied overt symptoms of panic attacks, lara, AH/Vh/HI/SI, intent or plan. Patient educated at bedside regarding medication regiment, including recommendations for both acute anxiety alleviation as well as sleep. Patient verbalized agreement and understanding with plan proposed.     11/19:Patient on exam A/Ox2, noted to be delirious, denied any AH/VH/HI/SI, intent or plan, educated about medication regiment.    11/21:Patient on exam intubated, collateral from Son at bedside, noted patient has been using Xanax more than prescribed amount on ISTOP.     11/27:Patient on exam A/Ox2-3, noted hx of 4mg of xanax use daily for the last several months, stated he has been anxious d/t physical discomfort on exam, denied any AH/VH/HI/SI, intent or plan.  11/29:Patient on exam noted difficultly sleeping, will increase Seroquel at this time to address sleep, patient denied any AH/Vh/Hi/Si.    12/2:Patient on exam noted having difficultly with sleep overnight, A/Ox3-4 on exam, educated regarding restarting Seroquel of which patient is amenable too.   12/5: Obtunded on exam due to ?syncopal episode as per nursing staff; please hold Seroquel/Ativan if patient remains hypersomnolent; monitor Qtc if < 500

## 2024-12-05 NOTE — PROGRESS NOTE ADULT - SUBJECTIVE AND OBJECTIVE BOX
Transplant Surgery - Multidisciplinary Progress Note   --------------------------------------------------------------  OLT   11/15/2024        POD#20    Present:   Patient seen and examined with multidisciplinary Transplant team including Surgeon: Dr. Dagher, Dr. Sorto, JAZZ Colby/Ascencion, Hepatologist: Dr. Luis, SICU team in AM rounds.   Disciplines not in attendance will be notified of the plan.     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     s/p OLT 11/15 with post op course c/b:  ·	Hypoxia: Reintubated 11/20, extubated 11/24->reintubated 11/24, extubated 11/26 (aspiration PNA, steroid psychosis)  ·	Ileus   ·	Fever  ·	A fib  ·	AMS/Seizure   ·	L brachial DVT    Interval Events:  -Afebrile, tachy, incr Metoprolol 50mg q8hrs  - incr eliquis 5/5  - MS better     Immunosuppression:  -Cyclo per level, MMF 500mg BID, pred 20  -ongoing monitoring for signs of rejection  Potential Discharge date: Pending clinical course     MEDICATIONS  (STANDING):  apixaban 5 milliGRAM(s) Oral every 12 hours  ascorbic acid 500 milliGRAM(s) Oral daily  aspirin  chewable 81 milliGRAM(s) Oral daily  atovaquone  Suspension 1500 milliGRAM(s) Oral daily  buDESOnide    Inhalation Suspension 0.5 milliGRAM(s) Inhalation two times a day  calcium carbonate 1250 mG  + Vitamin D (OsCal 500 + D) 1 Tablet(s) Oral every 12 hours  chlorhexidine 2% Cloths 1 Application(s) Topical <User Schedule>  cycloSPORINE  , modified (GENGRAF) Solution 75 milliGRAM(s) Oral <User Schedule>  cycloSPORINE  , modified (GENGRAF) Solution 50 milliGRAM(s) Oral <User Schedule>  digoxin  Injectable 125 MICROGram(s) IV Push daily  doxazosin 2 milliGRAM(s) Oral at bedtime  insulin lispro (ADMELOG) corrective regimen sliding scale   SubCutaneous three times a day before meals  insulin lispro (ADMELOG) corrective regimen sliding scale   SubCutaneous at bedtime  levETIRAcetam  IVPB 250 milliGRAM(s) IV Intermittent every 12 hours  lidocaine   4% Patch 1 Patch Transdermal every 24 hours  linaclotide 290 MICROGram(s) Oral daily  melatonin 5 milliGRAM(s) Oral at bedtime  metoprolol tartrate 50 milliGRAM(s) Oral every 8 hours  multivitamin 1 Tablet(s) Oral daily  mycophenolate mofetil Suspension 500 milliGRAM(s) Oral <User Schedule>  nystatin Powder 1 Application(s) Topical three times a day  pantoprazole  Injectable 40 milliGRAM(s) IV Push daily  potassium chloride   Powder 40 milliEquivalent(s) Oral once  potassium chloride  10 mEq/100 mL IVPB 10 milliEquivalent(s) IV Intermittent every 1 hour  predniSONE   Tablet 20 milliGRAM(s) Oral daily  QUEtiapine 50 milliGRAM(s) Oral at bedtime  sodium bicarbonate 1300 milliGRAM(s) Oral two times a day  valGANciclovir 50 mG/mL Oral Solution 900 milliGRAM(s) Oral daily    MEDICATIONS  (PRN):  acetaminophen     Tablet .. 500 milliGRAM(s) Oral every 6 hours PRN Mild Pain (1 - 3)  albuterol/ipratropium for Nebulization 3 milliLiter(s) Nebulizer every 6 hours PRN Shortness of Breath and/or Wheezing  bacitracin   Ointment 1 Application(s) Topical two times a day PRN abrasions  LORazepam   Injectable 0.5 milliGRAM(s) IV Push every 8 hours PRN Agitation  QUEtiapine 12.5 milliGRAM(s) Oral three times a day PRN anxiety      PAST MEDICAL & SURGICAL HISTORY:  Diabetes  Transaminitis  Paroxysmal atrial fibrillation  Depression  BPH (benign prostatic hyperplasia)  Hypertension  Chronic atrial fibrillation  Coronary artery disease  Hepatocellular carcinoma  DM (diabetes mellitus)  HTN (hypertension)  Paroxysmal atrial fibrillation  Cirrhosis  HCC (hepatocellular carcinoma)  History of BPH  History of laparoscopic cholecystectomy  History of lumbar laminectomy  H/O prior ablation treatment  H/O percutaneous left heart catheterization    Vital Signs Last 24 Hrs  T(C): 36.7 (05 Dec 2024 07:00), Max: 36.8 (05 Dec 2024 03:00)  T(F): 98 (05 Dec 2024 07:00), Max: 98.2 (05 Dec 2024 03:00)  HR: 102 (05 Dec 2024 08:00) (94 - 124)  BP: 106/75 (05 Dec 2024 08:00) (91/55 - 152/97)  BP(mean): 87 (05 Dec 2024 08:00) (66 - 120)  RR: 38 (05 Dec 2024 08:00) (24 - 38)  SpO2: 97% (05 Dec 2024 08:00) (94% - 100%)    Parameters below as of 05 Dec 2024 07:00  Patient On (Oxygen Delivery Method): room air      I&O's Summary    04 Dec 2024 07:01  -  05 Dec 2024 07:00  --------------------------------------------------------  IN: 1056 mL / OUT: 650 mL / NET: 406 mL                          10.8   11.13 )-----------( 175      ( 05 Dec 2024 06:25 )             36.0     12-05    143  |  111[H]  |  60[H]  ----------------------------<  241[H]  3.3[L]   |  14[L]  |  1.70[H]    Ca    7.5[L]      05 Dec 2024 06:25  Phos  3.8     12-05  Mg     2.3     12-05    TPro  4.2[L]  /  Alb  2.4[L]  /  TBili  0.6  /  DBili  x   /  AST  17  /  ALT  29  /  AlkPhos  121[H]  12-05      Culture - Stool (collected 12-01-24 @ 08:20)  Source: .Stool  Final Report (12-03-24 @ 17:39):    No enteric pathogens isolated.    (Stool culture examined for Salmonella,    Shigella, Campylobacter, Aeromonas, Plesiomonas,    Vibrio, E.coli O157 and Yersinia)    Review of systems  All other systems were reviewed and are negative, except as noted.      PHYSICAL EXAM:  Constitutional: NAD  Eyes:  PERRLA  ENMT: nc/at, no thrush   Neck: supple   Respiratory: CTA B/L  Cardiovascular: RRR  Gastrointestinal: softly distended--improving, appropriate incisional TTP. chevron incision c/d/i, staples in place. No signs of infection.   Genitourinary: Voiding,   Extremities: SCD's in place and working bilaterally, + LE edema  Neurological: A&O x3  Skin: no rashes, ulcerations, lesions

## 2024-12-05 NOTE — CHART NOTE - NSCHARTNOTEFT_GEN_A_CORE
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 Post-op course c/b worsening mental status and multiple intubations. S/P FEES on 12/3-> recommendations included minced and moist with moderately thick liquids. Per report, pharyngeal trigger is delayed, initiated deep in the hypopharynx. There is laryngeal penetration with incomplete retrieval on solids, mildly thick liquids, and ice chips.    Attempted to see patient for dysphagia therapy/diet monitor.   Pt found in bed, RN at bedside who reports patient had vasovagal event in AM, now with lethargy and receiving NGT for nutrition/hydration and medications. Patient pending CT head to assess for altered mental status, CT Chest to assess for pna and CT ABD to assess for ileus.     Recommendations: Given change in status, patient not a candidate for oral feeding. Agree with NGT as temporary alternative means of nutrition/hydration. Will continue to follow and reassess as clinically indicated.  D/W JAZZ Schmidt.     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 Post-op course c/b worsening mental status and multiple intubations. S/P FEES on 12/3-> recommendations included minced and moist with moderately thick liquids. Per report, pharyngeal trigger is delayed, initiated deep in the hypopharynx. There is laryngeal penetration with incomplete retrieval on solids, mildly thick liquids, and ice chips.    Attempted to see patient for dysphagia therapy/diet monitor.   Pt found in bed, SHIVANI Steve at bedside who reports patient had vasovagal event in AM, now with lethargy and receiving NGT for nutrition/hydration and medications. Patient pending CT head to assess for altered mental status, CT Chest to assess for pna and CT ABD to assess for ileus.     Recommendations: Given change in status, patient not a candidate for oral feeding. Agree with NGT as temporary alternative means of nutrition/hydration. Will continue to follow and reassess as clinically indicated.  D/W JAZZ Schmidt.     Latesha Santos Teams/ext [phone]   Speech Pathology

## 2024-12-05 NOTE — PROGRESS NOTE ADULT - ASSESSMENT
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 Pt underwent Y90 Sept, 2023 with favorable treatment response.s/p OLT 11/15/24     # S/p OLT 11/15/24 CMV D?R? EBV , toxo D?/R?  Valcyte,atovaquone ppx  now off fluconazole completed 2 weeks    # Fever, sepsis, hypoxic respiratory failure s/p on mechanical ventilation  Ct C/A/P Bilateral lower lobe consolidation with fluid and debris in the right lower lobe bronchi, concerning for aspiration pneumonia.  CMV PCR (11/24) 146 (low level CMV viremia - not likely contributing)  Adenovirus PCR (11/24) Negative  Cryptococcal Serum Ag (11/24) Negative  serum and bronch aspergillus galactomannan negative  WNV Serology and Serum PCR Negative  CT Chest (11/25) Groundglass opacities in the right lower and left upper lobes, possibly infectious in nature.  CT A/P (11/25) No acute process    # ileus  no diarrhea    Recommend  completed Meropenem 1 gram q 8hr (11/23 >11/29).   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 Pt underwent Y90 Sept, 2023 with favorable treatment response.s/p OLT 11/15/24     # S/p OLT 11/15/24 CMV D?R? EBV , toxo D?/R?  Valcyte,atovaquone ppx  now off fluconazole completed 2 weeks    # Fever, sepsis, hypoxic respiratory failure s/p on mechanical ventilation  Ct C/A/P Bilateral lower lobe consolidation with fluid and debris in the right lower lobe bronchi, concerning for aspiration pneumonia.  CMV PCR (11/24) 146 (low level CMV viremia - not likely contributing)  Adenovirus PCR (11/24) Negative  Cryptococcal Serum Ag (11/24) Negative  serum and bronch aspergillus galactomannan negative  WNV Serology and Serum PCR Negative  CT Chest (11/25) Groundglass opacities in the right lower and left upper lobes, possibly infectious in nature.  CT A/P (11/25) No acute process    # ileus  no diarrhea    Recommendations  Check BC, UA, UC, Follwo CT C/A/p, procalcitonin, RVP  Low threshold to begin zosyn or cefepime/flagyl and vancomycin empirically pending culture results  TTE 12/4 with trace pericardial effusion   Completed Meropenem 1 gram q 8hr (11/23 >11/29).   Repeat CMV PCR on 12/3/24, <34.5, continue CMV PPx    continue Valcyte 900 mg Q24H  continue Atovaquone 1,500 mg PO Q24H for PCP PPx        Thank you for involving us in the care of this patient  Transplant ID will continue to follow  Please call or page with additional questions  Pager; #[phone]  Teams: from 8 am to 5 pm  Rachele Lewis MD

## 2024-12-05 NOTE — PROGRESS NOTE ADULT - ASSESSMENT
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 (splenomegaly, recanalized paraumbilical vein, paraoesophageal and tera splenic varices), and with HCC found on 9/11/23 MRI, 1.8 cm seg 5 LR-5 HCC and a 3-4 cm seg 8 LR 4 HCC s/p Y90 Sept, 2023 with favorable treatment response, now s/p OLT on 11/15    [] POD #20 s/p OLT   - good graft function  - Diet: minced/moist diet  - Start IVF @ 84cc/hr, bolus 500cc  - Hypernatremia resolved  - Pain management: avoid nacrotics; add SImethcone 80mg q8hrs   - Bowel regimen  - Strict I&Os, Daily weights  - lasix PRN  - PO bicarb  - LE edema  - US: L brachial DVT   - daily PT     [ ] Immunosuppression  - Tacrolimus stopped 11/18 in setting of AMS/Seizure, Started Cyclo 11/24  - Cyclo by level, MMF 500mg bid, Pred 20mg daily   - SIMULECT completed  - PPx: Valcyte, Mepron, Fluc     [] lleus   - Resumed home Linzess  - imaging with distended colon (likely opioid induced)  - s/p Neostigmine x 2  - s/p Relistor, last dose 12/1  - Daily AXR     [] CMV viremia  - CMV  on  11/24  - Repeat CMV PCR 12/2: 34.5  - on Valcyte 900mg daily     [ ] AMS  - improving  - Seroquel  - Reintubated 11/20 Aspiration/hypoxia, extubated 11/24->[id number]/24--> extubated 11/26  - EEG 11/30 negative, Neurology following  -  following   - off tacro, now on cyclo  - on keppra    [ ] HTN/ pAFib  - increase BB prn  - cont Digoxin  - Eliquis 5mg bid  - Dr. Quintanilla following  - watch Digoxin levels, check with AM labs    [ ] DM  -Lantus/Lispro, adjust prn

## 2024-12-05 NOTE — BH CONSULTATION LIAISON PROGRESS NOTE - NSBHCONSULTRECOMMENDOTHER_PSY_A_CORE FT
Plan:    -C/W Ativan 0.5mg PO TID PRN for acute anxiety alleviation, if anxiety is refractory to Seroquel use  - Seroquel 50mg PO HS if QTC < 500 for ICU delirium and sleep architecture (HOLD FOR EXCESS SEDATION)  - Seroquel 12.5mg PO TID PRN for acute anxiety alleviation if QTC < 500   -Please HOLD: Wellbutrin, Lexapro, Abilify, Remeron given patient altered mental status   -Transplant psychiatry will continue to follow patient

## 2024-12-05 NOTE — PROGRESS NOTE ADULT - ASSESSMENT
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 (splenomegaly, recanalized paraumbilical vein, paraesophageal and tera splenic varices), and with HCC found on 9/11/23 MRI, 1.8 cm seg 5 LR-5 HCC and a 3-4 cm seg 8 LR 4 HCC s/p Y90 Sept, 2023 with favorable treatment response, now s/p OLT on 11/15    Wound Consult requested to assist w/ management of  Evolving deep tissue pressure injury (E-DTPI)  MASD scrotum   Incontinence of urine and stool    Recommendations:   E-DTPI continue -   Buttocks/ Sacrum TRIAD BID /cavilon and prn soiling  MASD- continue nystatin powder using crusting technique RN educated    -   Continue turning and positioning w/ offloading assistive devices as per protocol  -   Continue w/ attends under pads and Pericare as per protocol  -   Waffle Cushion to chair when oob to chair  -   Continue w/ alternating air with low air loss surface pressure redistribution bed surface   Moisturize intact skin w/ SWEEN cream BID  Appreciate nutrition  -   Consider CAMI to promote wound healing  Pt will need Group 2 mattress on hospital bed and ROHO cushion for wheel chair upon discharge home  Care as per medicine, will follow w/ you  Upon discharge f/u as outpatient at [facility] [address] [facility] [phone]  Seen &  D/w team & RN  Thank you for this consult  Malia Person [facility], [facility]   [phone]  Nights/ Weekends/ Holidays please call:  General Surgery Consult pager ([phone]) for emergencies  Wound PT for multilayer leg wrapping or VAC issues (x [phone])

## 2024-12-05 NOTE — PROGRESS NOTE ADULT - NS ATTEND AMEND GEN_ALL_CORE FT
Patient seen and examined with team. Plan as outlined.  Mental status improved. Liver and kidney function are good.  Remains with colonic dilatation. Abdomen benign.  Will restart home meds, daily AXRs, simethicone.  cyclo, cellcept, pred.

## 2024-12-05 NOTE — PROGRESS NOTE ADULT - ATTENDING COMMENTS
75 yo m, [facility] cirrhosis, HCC, s/p OLT.   N Multimodal pain management. Off sedation. Continue Keppra, quetiapine.  C TTE EF 70%, metoprolol 50 tid rate controlled. Vasovagal event this am with BM, resolved.  P NC 3L, sat >90. Duonebs.  I Vaclyte, mepron.   G Minced and moist diet. Resumed Linzess.  IS MMF, prednisone, cyclosporine.  R Net +400cc. Cr 1.7(1.43). Resuscitate 500cc crystalloid.  H Hgb 10.8, trend CBC.   DVT Eliquis, ASA.  I WBC 11, AF. Monitor cx.  E ISS 73 yo m, [facility] cirrhosis, HCC, s/p OLT.   N Multimodal pain management. Off sedation. Continue Keppra, quetiapine.  C TTE EF 70%, metoprolol 50 tid rate controlled. Vasovagal event this am with BM, improved with mild lethargy, no focal neurologic signs. Pending CT head.  P NC 3L, sat >90. Duonebs.  I Vaclyte, mepron.   G Minced and moist diet. Resumed Linzess. CT CAP pending.  IS MMF, prednisone, cyclosporine.  R Net +400cc. Cr 1.7(1.43). Resuscitate 500cc crystalloid.  H Hgb 10.8, trend CBC.   DVT Eliquis, ASA.  I WBC 11, AF. Monitor cx.  E ISS

## 2024-12-05 NOTE — PROGRESS NOTE ADULT - SUBJECTIVE AND OBJECTIVE BOX
SICU Daily Progress Note  =====================================================  Interval/Overnight Events:     24 HOUR EVENTS:   - TTE ordered  - Pt may require cath eventually  - metoprolol to 25mg q8hr  - eliquis 5 BID  - ISS  - Started linzess 290 (home dose)  -> metop 50 TID    Allergies: No Known Allergies      MEDICATIONS:   --------------------------------------------------------------------------------------  Neurologic Medications  levETIRAcetam  IVPB 250 milliGRAM(s) IV Intermittent every 12 hours  LORazepam   Injectable 0.5 milliGRAM(s) IV Push every 8 hours PRN Agitation  melatonin 5 milliGRAM(s) Oral at bedtime  QUEtiapine 12.5 milliGRAM(s) Oral three times a day PRN anxiety  QUEtiapine 50 milliGRAM(s) Oral at bedtime    Respiratory Medications  buDESOnide    Inhalation Suspension 0.5 milliGRAM(s) Inhalation two times a day    Cardiovascular Medications  digoxin  Injectable 125 MICROGram(s) IV Push daily  doxazosin 2 milliGRAM(s) Oral at bedtime  metoprolol tartrate 50 milliGRAM(s) Oral every 8 hours    Gastrointestinal Medications  linaclotide 290 MICROGram(s) Oral daily  pantoprazole  Injectable 40 milliGRAM(s) IV Push daily  potassium chloride    Tablet ER 40 milliEquivalent(s) Oral once  sodium bicarbonate 1300 milliGRAM(s) Oral two times a day    Genitourinary Medications    Hematologic/Oncologic Medications  apixaban 5 milliGRAM(s) Oral every 12 hours  aspirin  chewable 81 milliGRAM(s) Oral daily  cycloSPORINE  , modified (GENGRAF) Solution 75 milliGRAM(s) Oral <User Schedule>  cycloSPORINE  , modified (GENGRAF) Solution 50 milliGRAM(s) Oral <User Schedule>  mycophenolate mofetil Suspension 500 milliGRAM(s) Oral <User Schedule>    Antimicrobial/Immunologic Medications  atovaquone  Suspension 1500 milliGRAM(s) Oral daily  valGANciclovir 50 mG/mL Oral Solution 900 milliGRAM(s) Oral daily    Endocrine/Metabolic Medications  insulin lispro (ADMELOG) corrective regimen sliding scale   SubCutaneous three times a day before meals  insulin lispro (ADMELOG) corrective regimen sliding scale   SubCutaneous at bedtime  predniSONE   Tablet 20 milliGRAM(s) Oral daily    Topical/Other Medications  bacitracin   Ointment 1 Application(s) Topical two times a day PRN abrasions  chlorhexidine 2% Cloths 1 Application(s) Topical <User Schedule>  lidocaine   4% Patch 1 Patch Transdermal every 24 hours  nystatin Powder 1 Application(s) Topical three times a day    --------------------------------------------------------------------------------------    VITAL SIGNS, INS/OUTS (last 24 hours):  --------------------------------------------------------------------------------------  T(C): 36.4 (12-04-24 @ 23:00), Max: 36.6 (12-04-24 @ 11:00)  HR: 103 (12-05-24 @ 01:00) (102 - 126)  BP: 98/56 (12-05-24 @ 01:00) (98/56 - 152/97)  RR: 27 (12-05-24 @ 01:00) (21 - 36)  SpO2: 96% (12-05-24 @ 01:00) (95% - 100%)    12-03-24 @ 07:01  -  12-04-24 @ 07:00  --------------------------------------------------------  IN: 1033.3 mL / OUT: 950 mL / NET: 83.3 mL    12-04-24 @ 07:01  -  12-05-24 @ 01:25  --------------------------------------------------------  IN: 1056 mL / OUT: 200 mL / NET: 856 mL      --------------------------------------------------------------------------------------    EXAM  NEUROLOGY  Exam: Normal, NAD, alert, oriented x3, no focal deficits.    HEENT  Exam: Normocephalic, atraumatic, EOMI.     RESPIRATORY  Exam: Normal expansion/effort.    CARDIOVASCULAR  Exam: Regular rate and rhythm.       GI/NUTRITION  Exam: Abdomen soft, Non-tender, Non-distended.     VASCULAR  Exam: Extremities warm, pink, well-perfused.     MUSCULOSKELETAL  Exam: All extremities moving spontaneously without limitations.     SKIN  Exam: Good skin turgor, no skin breakdown.       LABS  --------------------------------------------------------------------------------------                        10.8   12.95 )-----------( 195      ( 04 Dec 2024 17:29 )             36.1   12-04    144  |  112[H]  |  56[H]  ----------------------------<  137[H]  3.1[L]   |  18[L]  |  1.56[H]    Ca    7.6[L]      04 Dec 2024 17:29  Phos  2.9     12-04  Mg     2.4     12-04    TPro  4.5[L]  /  Alb  2.6[L]  /  TBili  0.7  /  DBili  x   /  AST  17  /  ALT  30  /  AlkPhos  129[H]  12-04  Urinalysis Basic - ( 04 Dec 2024 17:29 )    Color: x / Appearance: x / SG: x / pH: x  Gluc: 137 mg/dL / Ketone: x  / Bili: x / Urobili: x   Blood: x / Protein: x / Nitrite: x   Leuk Esterase: x / RBC: x / WBC x   Sq Epi: x / Non Sq Epi: x / Bacteria: x    PT/INR - ( 03 Dec 2024 18:27 )   PT: 13.8 sec;   INR: 1.20 ratio         PTT - ( 03 Dec 2024 18:27 )  PTT:26.6 sec  --------------------------------------------------------------------------------------

## 2024-12-05 NOTE — BH CONSULTATION LIAISON PROGRESS NOTE - PRN MEDS
Xanax 0.5mg yesterday 2130
Ativan 0.5mg 11/29/24
Ativan 0.5mg 12/1
Ativan 0.5mg 12/1
Ativan 0.5mg 11/29/24
Ativan 1mg IV

## 2024-12-05 NOTE — PROGRESS NOTE ADULT - SUBJECTIVE AND OBJECTIVE BOX
HPI:  Patient seen and examined at bedside in SICU.  Sinus tachycardia.  MORAIMA.    Review Of Systems:           Respiratory: No shortness of breath, cough, or wheezing  Cardiovascular: No chest pain or palpitations  10 point review of systems is otherwise negative except as mentioned above        Medications:  acetaminophen   IVPB .. 500 milliGRAM(s) IV Intermittent every 6 hours PRN  albuterol/ipratropium for Nebulization 3 milliLiter(s) Nebulizer every 6 hours PRN  bacitracin   Ointment 1 Application(s) Topical two times a day PRN  buDESOnide    Inhalation Suspension 0.5 milliGRAM(s) Inhalation two times a day  chlorhexidine 2% Cloths 1 Application(s) Topical <User Schedule>  dextrose 5% 1000 milliLiter(s) IV Continuous <Continuous>  digoxin  Injectable 125 MICROGram(s) IV Push daily  insulin glargine Injectable (LANTUS) 6 Unit(s) SubCutaneous at bedtime  insulin regular Infusion 2 Unit(s)/Hr IV Continuous <Continuous>  levETIRAcetam  IVPB 250 milliGRAM(s) IV Intermittent every 12 hours  lidocaine   4% Patch 1 Patch Transdermal every 24 hours  methylnaltrexone Injectable 12 milliGRAM(s) SubCutaneous every other day  metoprolol tartrate 50 milliGRAM(s) Enteral Tube every 8 hours  mycophenolate mofetil Suspension 500 milliGRAM(s) Enteral Tube <User Schedule>  nystatin Powder 1 Application(s) Topical three times a day  pantoprazole  Injectable 40 milliGRAM(s) IV Push daily  potassium chloride  10 mEq/100 mL IVPB 10 milliEquivalent(s) IV Intermittent every 1 hour  QUEtiapine 12.5 milliGRAM(s) Oral every 8 hours PRN  simethicone 80 milliGRAM(s) Chew every 8 hours    PAST MEDICAL & SURGICAL HISTORY:  Diabetes      Transaminitis      Paroxysmal atrial fibrillation      Depression      BPH (benign prostatic hyperplasia)      Hypertension      Chronic atrial fibrillation      Coronary artery disease      Hepatocellular carcinoma      DM (diabetes mellitus)      HTN (hypertension)      Paroxysmal atrial fibrillation      Cirrhosis      HCC (hepatocellular carcinoma)      History of BPH      History of laparoscopic cholecystectomy      History of lumbar laminectomy      H/O prior ablation treatment      H/O percutaneous left heart catheterization        Vitals:  T(C): 37.1 (12-05-24 @ 22:00), Max: 37.5 (12-05-24 @ 15:00)  HR: 97 (12-05-24 @ 23:00) (85 - 118)  BP: 134/78 (12-05-24 @ 23:00) (91/55 - 146/67)  BP(mean): 101 (12-05-24 @ 23:00) (66 - 101)  RR: 22 (12-05-24 @ 23:00) (17 - 38)  SpO2: 99% (12-05-24 @ 23:00) (94% - 100%)  Wt(kg): --  Daily     Daily   I&O's Summary    04 Dec 2024 07:01  -  05 Dec 2024 07:00  --------------------------------------------------------  IN: 1056 mL / OUT: 650 mL / NET: 406 mL    05 Dec 2024 07:01  -  05 Dec 2024 23:39  --------------------------------------------------------  IN: 3045 mL / OUT: 550 mL / NET: 2495 mL        Physical Exam:  Appearance: No acute distress; well appearing  Eyes: PERRL, EOMI, pink conjunctiva  HENT: Normal oral mucosa  Cardiovascular: RRR, S1, S2, no murmurs, rubs, or gallops; no edema; no JVD  Respiratory: Clear to auscultation bilaterally  Gastrointestinal: soft, non-tender, non-distended with normal bowel sounds  Musculoskeletal: No clubbing; no joint deformity   Neurologic: Non-focal  Lymphatic: No lymphadenopathy  Psychiatry: AAOx3, mood & affect appropriate  Skin: No rashes, ecchymoses, or cyanosis                          10.8   11.13 )-----------( 175      ( 05 Dec 2024 06:25 )             36.0     12-05    146[H]  |  115[H]  |  58[H]  ----------------------------<  140[H]  3.0[L]   |  18[L]  |  1.66[H]    Ca    7.2[L]      05 Dec 2024 21:10  Phos  3.4     12-05  Mg     2.3     12-05    TPro  3.8[L]  /  Alb  2.3[L]  /  TBili  0.5  /  DBili  x   /  AST  22  /  ALT  27  /  AlkPhos  100  12-05    PT/INR - ( 05 Dec 2024 06:25 )   PT: 16.4 sec;   INR: 1.44 ratio         PTT - ( 05 Dec 2024 06:25 )  PTT:28.8 sec        Cardiovascular Diagnostic Testing:  ECG: sinus rhythm    Echo: < from: Intra-Operative Transesophageal Echo W or WO Ultrasound Enhancing Agent (11.15.24 @ 07:46) >  --------------------------------------------------------------------------------  PRE-BYPASS FINDINGS     Left Ventricle:  Left ventricular ejection fraction is estimated at 60 to 65%. Normal left ventricularwall thickness. The left ventricular systolic function is normal There are no regional wall motion abnormalities seen.     Right Ventricle:  The right ventricular cavity is normal in size, with normal wall thickness and right ventricular systolic function is normal. Right ventricular systolic function is normal.     Left Atrium:  The left atrium is normal in size. No thrombus visualized in left atrial appendage.     Right Atrium:  The right atrium is normal in size. The right atrium is normal in size.     Interatrial Septum:  No PFO visualized with color flow doppler.     Aortic Valve:  The aortic valve appears trileaflet. There is no aortic valve stenosis. There is trace aortic regurgitation.     Mitral Valve:  There is no mitral valve stenosis. There is no mitral valve stenosis. There is trace mitral regurgitation.     Tricuspid Valve:  There is no evidence of tricuspid stenosis. There is trace tricuspid regurgitation.     Pericardium:  No pericardial effusion seen.     Post-Bypass:  S/P OLT. Under current loading conditions, hyperdynamic left ventricular systolic function, EF: 70-75% by visual estimate, no RWMA. Normal right ventricular systolic function. All other findings unchanged. Probe removed atraumatically, no blood. The patient tolerated the procedure well and without complications. Permanent recorded images are stored in the medical record.     Electronically signed by James Villareal on 11/15/2024 at 2:18:16 PM         *** Final ***    < end of copied text >      Stress Testing:     Cath: 4/24/2024, patient had his LHC repeated with Ben Villareal MD at [facility].  Cath showed multivessel coronary artery disease, but the lesions previously noted were FFR negative.  He continues to have MIRTA 3 flow throughout.    Interpretation of Telemetry: Sinus     Imaging:

## 2024-12-05 NOTE — BH CONSULTATION LIAISON PROGRESS NOTE - [id number]_PSY_A_CORE
Patient seen with nursing staff at bedside, noted to be obtunded. Per nursing staff may have had a syncopal episode prior to writer arriving. No overt behavioral disturbance reported. Per EMR patient received standing Seroquel at HS in conjunction with a PRN 12.5 mg Seroquel dose and Ativan 0.5 mg. Unclear if it was to address sleep architecture/anxiety.

## 2024-12-05 NOTE — PROGRESS NOTE ADULT - SUBJECTIVE AND OBJECTIVE BOX
Follow Up:  fever    Interval History: afebrile. No acute events.   WBC normal, On RA  tachycardic            Vital Signs Last 24 Hrs  T(C): 36.6 (04 Dec 2024 11:00), Max: 36.8 (03 Dec 2024 23:00)  T(F): 97.9 (04 Dec 2024 11:00), Max: 98.2 (03 Dec 2024 23:00)  HR: 119 (04 Dec 2024 11:08) (96 - 126)  BP: 110/55 (04 Dec 2024 11:08) (99/63 - 146/82)  BP(mean): 79 (04 Dec 2024 11:08) (72 - 106)  RR: 25 (04 Dec 2024 11:08) (15 - 28)  SpO2: 97% (04 Dec 2024 11:08) (95% - 100%)    Parameters below as of 04 Dec 2024 11:08  Patient On (Oxygen Delivery Method): room air      PHYSICAL EXAMINATION:  General: Alert and Awake, NAD  Cardiac: RRR, No M/R/G  Resp: CTAB, No Wh/Rh/Ra  Abdomen: NBS, NT/ND, No HSM, No rigidity or guarding  MSK: No LE edema. No Calf tenderness  Skin: No rashes or lesions. Skin is warm and dry to the touch.   Neuro: Alert and Awake. CN 2-12 Grossly intact. Moves all four extremities spontaneously.  Psych: Encephalopathic - unable to assess                   ____________________________________________________  ROS  GENERAL: denies chills, , night sweats, weight loss.   PSYCH: denies depression, anxiety, suicidal ideation, hallucination, and delusions  SKIN: no rash or lesions; no color changes, no abnormal nevi,no  dryness, and nojaundice    EYES: denies visual changes, floaters, pain, inflammation, blurred vision, and discharge  ENT: denies tinnitus, vertigo, epistaxis, oral lesion, and decreased acuity  PULM: denies, hemoptysis, pleurisy  CVS: denies angina, palpitations,+ orthopnea, no syncope, or heart murmur  GI: denies constipation, diarrhea, melena, abdominal pain, nausea.   : denies dysuria, frequency, discharge, incontinence, stones or macroscopic hematuria  MS: no arthralgias, no erythema or swelling, no myalgias, noedema, or lower back pain.   CNS: denies numbness, dizziness, seizure, or tremor  ENDO: denies heat/cold intolerance, polyuria, polydipsia, malaise.    HEME: denies bruising, bleeding, lymphadenopathy, anemia, and calf pain    Allergies  No Known Allergies    __________________________________________________  MEDS:  MEDICATIONS  (STANDING):  apixaban 5 every 12 hours  aspirin  chewable 81 daily  buDESOnide    Inhalation Suspension 0.5 two times a day  cycloSPORINE  , modified (GENGRAF) Solution 75 <User Schedule>  cycloSPORINE  , modified (GENGRAF) Solution 50 <User Schedule>  digoxin  Injectable 125 daily  doxazosin 2 at bedtime  insulin lispro (ADMELOG) corrective regimen sliding scale  three times a day before meals  insulin lispro (ADMELOG) corrective regimen sliding scale  at bedtime  levETIRAcetam  IVPB 250 every 12 hours  LORazepam   Injectable 0.5 every 8 hours PRN  metoprolol tartrate 25 every 8 hours  mycophenolate mofetil Suspension 500 <User Schedule>  pantoprazole  Injectable 40 daily  predniSONE   Tablet 20 daily  QUEtiapine 50 at bedtime    _________________________________________________  ANTIMICOBIALS  atovaquone  Suspension 1500 daily  cycloSPORINE  , modified (GENGRAF) Solution 75 <User Schedule>  cycloSPORINE  , modified (GENGRAF) Solution 50 <User Schedule>  mycophenolate mofetil Suspension 500 <User Schedule>  valGANciclovir 50 mG/mL Oral Solution 900 daily      GENERAL LABS              10.3                 140  | 15   | 51           9.65  >-----------< 162     ------------------------< 255                   33.5                 3.3  | 110  | 1.32                                         Ca 7.2   Mg 2.2   Ph 3.2        Urinalysis Basic - ( 04 Dec 2024 06:12 )    Color: x / Appearance: x / SG: x / pH: x  Gluc: 255 mg/dL / Ketone: x  / Bili: x / Urobili: x   Blood: x / Protein: x / Nitrite: x   Leuk Esterase: x / RBC: x / WBC x   Sq Epi: x / Non Sq Epi: x / Bacteria: x        _________________________________________________  MICROBIOLOGY  -----------    Culture - Stool (collected 01 Dec 2024 08:20)  Source: .Stool  Final Report (03 Dec 2024 17:39):    No enteric pathogens isolated.    (Stool culture examined for Salmonella,    Shigella, Campylobacter, Aeromonas, Plesiomonas,    Vibrio, E.coli O157 and Yersinia)            CMVPCR Log: Det <1.54 Assay Dynamic Range: 34.5 to 1.0E+07 IU/mL (1.54 to 7.00 Log10 IU/mL)  Assay lower limit of quantification (LLOQ) is 34.5 IU/mL (1.54 Log10  IU/mL)  CMV DNA detected below the LLOQ will be reported as Detected < 34.5 IU/mL  (<1.54 Log10 IU/mL)  Nydia Cytomegalovirus (CMV) is an FDA-cleared quantitative test that  enables the detection and quantitation of CMV DNA in EDTA plasma of  infected transplant patients on the nydia 8800 system. This test was  verified by [employer]. Results should be interpreted  with consideration of all clinical findings and laboratory findings and  should not form the sole basis for a diagnosis or treatment decision. Csa84IQ/mL (12-03 @ 06:11)  CMVPCR Log: Det <1.54 Assay Dynamic Range: 34.5 to 1.0E+07 IU/mL (1.54 to 7.00 Log10 IU/mL)  Assay lower limit of quantification (LLOQ) is 34.5 IU/mL (1.54 Log10  IU/mL)  CMV DNA detected below the LLOQ will be reported as Detected < 34.5 IU/mL  (<1.54 Log10 IU/mL)  Nydia Cytomegalovirus (CMV) is an FDA-cleared quantitative test that  enables the detection and quantitation of CMV DNA in EDTA plasma of  infected transplant patients on the nydia 8800 system. This test was  verified by [employer]. Results should be interpreted  with consideration of all clinical findings and laboratory findings and  should not form the sole basis for a diagnosis or treatment decision. Qtg72RT/mL (12-02 @ 18:19)    Clostridium difficile GDH Toxins A&amp;B, EIA:   Negative (12-01-24 @ 08:25)  Clostridium difficile GDH Interpretation: Negative for toxigenic C. Difficile.  This specimen is negative for C.  Difficile glutamate dehydrogenase (GDH) antigen and negative for C.  Difficile Toxins A & B, by EIA.  GDH is a highly sensitive screening  marker for C. Difficile that is produced in large amounts by all C.  Difficile strains, both toxigenic and nontoxigenic.  This assay has not  been validated as a test of cure.  Repeat testing during the same episode  of diarrhea is of limited value and is discouraged.  The results of this  assay should always be interpreted in conjunction with patient's clinical  history. (12-01-24 @ 08:25)        Fungitell:   _______________________________________________  PERTINENT IMAGING   Follow Up:  fever    Interval History: afebrile. No acute events.   WBC 11.9 On RA but altered today   Tachycardic  low BPs          Vital Signs Last 24 Hrs  T(C): 36.6 (04 Dec 2024 11:00), Max: 36.8 (03 Dec 2024 23:00)  T(F): 97.9 (04 Dec 2024 11:00), Max: 98.2 (03 Dec 2024 23:00)  HR: 119 (04 Dec 2024 11:08) (96 - 126)  BP: 110/55 (04 Dec 2024 11:08) (99/63 - 146/82)  BP(mean): 79 (04 Dec 2024 11:08) (72 - 106)  RR: 25 (04 Dec 2024 11:08) (15 - 28)  SpO2: 97% (04 Dec 2024 11:08) (95% - 100%)    Parameters below as of 04 Dec 2024 11:08  Patient On (Oxygen Delivery Method): room air      PHYSICAL EXAMINATION:  General: Alert and Awake, NAD  Cardiac: RRR, No M/R/G  Resp: CTAB, No Wh/Rh/Ra  Abdomen: NBS, NT/ND, No HSM, No rigidity or guarding  MSK: No LE edema. No Calf tenderness  Skin: No rashes or lesions. Skin is warm and dry to the touch.   Neuro: Alert and Awake. CN 2-12 Grossly intact. Moves all four extremities spontaneously.  Psych: Encephalopathic - unable to assess                   ____________________________________________________  ROS  GENERAL: denies chills, , night sweats, weight loss.   PSYCH: denies depression, anxiety, suicidal ideation, hallucination, and delusions  SKIN: no rash or lesions; no color changes, no abnormal nevi,no  dryness, and nojaundice    EYES: denies visual changes, floaters, pain, inflammation, blurred vision, and discharge  ENT: denies tinnitus, vertigo, epistaxis, oral lesion, and decreased acuity  PULM: denies, hemoptysis, pleurisy  CVS: denies angina, palpitations,+ orthopnea, no syncope, or heart murmur  GI: denies constipation, diarrhea, melena, abdominal pain, nausea.   : denies dysuria, frequency, discharge, incontinence, stones or macroscopic hematuria  MS: no arthralgias, no erythema or swelling, no myalgias, noedema, or lower back pain.   CNS: denies numbness, dizziness, seizure, or tremor  ENDO: denies heat/cold intolerance, polyuria, polydipsia, malaise.    HEME: denies bruising, bleeding, lymphadenopathy, anemia, and calf pain    Allergies  No Known Allergies    __________________________________________________  MEDS:  MEDICATIONS  (STANDING):  apixaban 5 every 12 hours  aspirin  chewable 81 daily  buDESOnide    Inhalation Suspension 0.5 two times a day  cycloSPORINE  , modified (GENGRAF) Solution 75 <User Schedule>  cycloSPORINE  , modified (GENGRAF) Solution 50 <User Schedule>  digoxin  Injectable 125 daily  doxazosin 2 at bedtime  insulin lispro (ADMELOG) corrective regimen sliding scale  three times a day before meals  insulin lispro (ADMELOG) corrective regimen sliding scale  at bedtime  levETIRAcetam  IVPB 250 every 12 hours  LORazepam   Injectable 0.5 every 8 hours PRN  metoprolol tartrate 25 every 8 hours  mycophenolate mofetil Suspension 500 <User Schedule>  pantoprazole  Injectable 40 daily  predniSONE   Tablet 20 daily  QUEtiapine 50 at bedtime    _________________________________________________  ANTIMICOBIALS  atovaquone  Suspension 1500 daily  cycloSPORINE  , modified (GENGRAF) Solution 75 <User Schedule>  cycloSPORINE  , modified (GENGRAF) Solution 50 <User Schedule>  mycophenolate mofetil Suspension 500 <User Schedule>  valGANciclovir 50 mG/mL Oral Solution 900 daily      GENERAL LABS              10.3                 140  | 15   | 51           9.65  >-----------< 162     ------------------------< 255                   33.5                 3.3  | 110  | 1.32                                         Ca 7.2   Mg 2.2   Ph 3.2        Urinalysis Basic - ( 04 Dec 2024 06:12 )    Color: x / Appearance: x / SG: x / pH: x  Gluc: 255 mg/dL / Ketone: x  / Bili: x / Urobili: x   Blood: x / Protein: x / Nitrite: x   Leuk Esterase: x / RBC: x / WBC x   Sq Epi: x / Non Sq Epi: x / Bacteria: x        _________________________________________________  MICROBIOLOGY  -----------    Culture - Stool (collected 01 Dec 2024 08:20)  Source: .Stool  Final Report (03 Dec 2024 17:39):    No enteric pathogens isolated.    (Stool culture examined for Salmonella,    Shigella, Campylobacter, Aeromonas, Plesiomonas,    Vibrio, E.coli O157 and Yersinia)            CMVPCR Log: Det <1.54 Assay Dynamic Range: 34.5 to 1.0E+07 IU/mL (1.54 to 7.00 Log10 IU/mL)  Assay lower limit of quantification (LLOQ) is 34.5 IU/mL (1.54 Log10  IU/mL)  CMV DNA detected below the LLOQ will be reported as Detected < 34.5 IU/mL  (<1.54 Log10 IU/mL)  Nydia Cytomegalovirus (CMV) is an FDA-cleared quantitative test that  enables the detection and quantitation of CMV DNA in EDTA plasma of  infected transplant patients on the nydia 8800 system. This test was  verified by [employer]. Results should be interpreted  with consideration of all clinical findings and laboratory findings and  should not form the sole basis for a diagnosis or treatment decision. Rks85HQ/mL (12-03 @ 06:11)  CMVPCR Log: Det <1.54 Assay Dynamic Range: 34.5 to 1.0E+07 IU/mL (1.54 to 7.00 Log10 IU/mL)  Assay lower limit of quantification (LLOQ) is 34.5 IU/mL (1.54 Log10  IU/mL)  CMV DNA detected below the LLOQ will be reported as Detected < 34.5 IU/mL  (<1.54 Log10 IU/mL)  Nydia Cytomegalovirus (CMV) is an FDA-cleared quantitative test that  enables the detection and quantitation of CMV DNA in EDTA plasma of  infected transplant patients on the nydia 8800 system. This test was  verified by [employer]. Results should be interpreted  with consideration of all clinical findings and laboratory findings and  should not form the sole basis for a diagnosis or treatment decision. Kdm66AA/mL (12-02 @ 18:19)    Clostridium difficile GDH Toxins A&amp;B, EIA:   Negative (12-01-24 @ 08:25)  Clostridium difficile GDH Interpretation: Negative for toxigenic C. Difficile.  This specimen is negative for C.  Difficile glutamate dehydrogenase (GDH) antigen and negative for C.  Difficile Toxins A & B, by EIA.  GDH is a highly sensitive screening  marker for C. Difficile that is produced in large amounts by all C.  Difficile strains, both toxigenic and nontoxigenic.  This assay has not  been validated as a test of cure.  Repeat testing during the same episode  of diarrhea is of limited value and is discouraged.  The results of this  assay should always be interpreted in conjunction with patient's clinical  history. (12-01-24 @ 08:25)        Fungitell:   _______________________________________________  PERTINENT IMAGING

## 2024-12-05 NOTE — PROGRESS NOTE ADULT - SUBJECTIVE AND OBJECTIVE BOX
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  All questions answered to the expressed satisfaction of patients family members   dc planning ongoing      Diet, Minced and Moist:   Consistent Carbohydrate Evening Snack (CSTCHOSN)  Moderately Thick Liquids (MODTHICKLIQS)  Supplement Feeding Modality:  Oral  Ensure Max Cans or Servings Per Day:  2       Frequency:  Daily (12-04-24 @ 08:01) [Active]          ROS:   pt unable to participate    ALLERGIES & MEDICATIONS  --------------------------------------------------------------------------------  Allergies    No Known Allergies    Intolerances          STANDING INPATIENT MEDICATIONS    apixaban 5 milliGRAM(s) Oral every 12 hours  ascorbic acid 500 milliGRAM(s) Oral daily  aspirin  chewable 81 milliGRAM(s) Oral daily  atovaquone  Suspension 1500 milliGRAM(s) Oral daily  buDESOnide    Inhalation Suspension 0.5 milliGRAM(s) Inhalation two times a day  calcium carbonate 1250 mG  + Vitamin D (OsCal 500 + D) 1 Tablet(s) Oral every 12 hours  chlorhexidine 2% Cloths 1 Application(s) Topical <User Schedule>  dextrose 5% + sodium chloride 0.45% 1000 milliLiter(s) IV Continuous <Continuous>  digoxin  Injectable 125 MICROGram(s) IV Push daily  doxazosin 2 milliGRAM(s) Oral at bedtime  insulin regular Infusion 2 Unit(s)/Hr IV Continuous <Continuous>  levETIRAcetam  IVPB 250 milliGRAM(s) IV Intermittent every 12 hours  lidocaine   4% Patch 1 Patch Transdermal every 24 hours  melatonin 5 milliGRAM(s) Oral at bedtime  methylnaltrexone Injectable 12 milliGRAM(s) SubCutaneous every other day  metoprolol tartrate 50 milliGRAM(s) Oral every 8 hours  multivitamin 1 Tablet(s) Oral daily  mycophenolate mofetil Suspension 500 milliGRAM(s) Oral <User Schedule>  nystatin Powder 1 Application(s) Topical three times a day  pantoprazole  Injectable 40 milliGRAM(s) IV Push daily  potassium chloride  10 mEq/100 mL IVPB 10 milliEquivalent(s) IV Intermittent once  predniSONE   Tablet 20 milliGRAM(s) Oral daily  QUEtiapine 50 milliGRAM(s) Oral at bedtime  simethicone 80 milliGRAM(s) Chew every 8 hours  sodium bicarbonate  Infusion 0.067 mEq/kG/Hr IV Continuous <Continuous>  valGANciclovir 50 mG/mL Oral Solution 900 milliGRAM(s) Oral daily      PRN INPATIENT MEDICATION  acetaminophen     Tablet .. 500 milliGRAM(s) Oral every 6 hours PRN  albuterol/ipratropium for Nebulization 3 milliLiter(s) Nebulizer every 6 hours PRN  bacitracin   Ointment 1 Application(s) Topical two times a day PRN  QUEtiapine 12.5 milliGRAM(s) Oral three times a day PRN        VITALS/PHYSICAL EXAM  --------------------------------------------------------------------------------  T(C): 37.5 (12-05-24 @ 15:00), Max: 37.5 (12-05-24 @ 15:00)  HR: 114 (12-05-24 @ 16:00) (94 - 124)  BP: 106/56 (12-05-24 @ 16:00) (91/55 - 152/97)  RR: 19 (12-05-24 @ 16:00) (19 - 38)  SpO2: 98% (12-05-24 @ 16:00) (94% - 100%)  Wt(kg): --        12-04-24 @ 07:01  -  12-05-24 @ 07:00  --------------------------------------------------------  IN: 1056 mL / OUT: 650 mL / NET: 406 mL    12-05-24 @ 07:01  -  12-05-24 @ 17:34  --------------------------------------------------------  IN: 1592 mL / OUT: 0 mL / NET: 1592 mL                 HEENT: sclera clear, mucosa moist, throat clear, trachea midline, neck supple  Respiratory: Nonlabored w/ equal chest rise  Gastrointestinal: soft NT/ND   : Deferred  Neurology: nonverbal, somnolent  Psych: difficult to assess  Musculoskeletal:  no deformities/ contractures  Vascular: BLE edema equal  Skin:  Sacrum/bilateral buttocks with central superficially denuded skin with a perimeter of persistently deep dark maroon   6.0cm x 4.0cm x 0.1cm, scant serosanguinous drainage, periwound erythema     there is no blistering, no odor, no fluctuance       no induration, no crepitus    scrotum edematous, macerated        LABS/ CULTURES/ RADIOLOGY:                10.8   11.13 >-----------<  175      [12-05-24 @ 06:25]              36.0     144  |  112  |  65  ----------------------------<  194      [12-05-24 @ 14:25]  3.5   |  18  |  1.74        Ca     7.4     [12-05-24 @ 14:25]      Mg     2.3     [12-05-24 @ 14:25]      Phos  3.7     [12-05-24 @ 14:25]    TPro  4.1  /  Alb  2.3  /  TBili  0.6  /  DBili  x   /  AST  33  /  ALT  32  /  AlkPhos  109  [12-05-24 @ 14:25]    PT/INR: PT 16.4 , INR 1.44       [12-05-24 @ 06:25]  PTT: 28.8       [12-05-24 @ 06:25]      Blood Gas Calcium, Ionized - Venous: 1.17 mmol/L (12-05-24 @ 11:30)      CAPILLARY BLOOD GLUCOSE      POCT Blood Glucose.: 150 mg/dL (05 Dec 2024 16:31)  POCT Blood Glucose.: 161 mg/dL (05 Dec 2024 15:34)  POCT Blood Glucose.: 192 mg/dL (05 Dec 2024 14:32)  POCT Blood Glucose.: 180 mg/dL (05 Dec 2024 13:44)  POCT Blood Glucose.: 289 mg/dL (05 Dec 2024 09:15)  POCT Blood Glucose.: 219 mg/dL (05 Dec 2024 08:18)  POCT Blood Glucose.: 194 mg/dL (04 Dec 2024 23:08)            Vitamin D, 25-Hydroxy: <6.0 ng/mL (11-21-24 @ 08:32)

## 2024-12-05 NOTE — PROGRESS NOTE ADULT - ASSESSMENT
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 Post-op course c/b worsening mental status and re-intubation 11/20 for acute hypoxemic respiratory failure 2/2 aspiration, extubated 11/24 and re-intubated 11/24. Pt then extubated 11/26, now on nasal cannula.  Colonic ileus s/p several rounds of Relistor and Neostigmine, rectal tube, now with improvement.  ?Steroid Psychosis, now improving, minimal narcotics/Ativan requirements.    PLAN:  Neuro:  - Off sedation   - pain control w/ Tylenol 500mg q6, oycodone 2.5/5 prn, lido patch   - takes xanax 2mg TID at home, s/p Benzo taper  - CT head 11/19 and 11/21 negative  - CT head 11/25 - negative   - CT head 11/29 - negative   - EEG: Mild diffuse cerebral dysfunction that is not specific in etiology. No epileptic discharges recorded. No seizures recorded.  - Keppra: 250 BID   - Seroquel 50mg HS,12.5 BID prn   - Ativan 0.5mg q8h PRN for agitation  - Holding home xanax, Abilify, Zoloft, Remeron, Lexapro     Resp:  - Room air  - CT chest 11/25: Groundglass opacities in the right lower and left upper lobes, possibly infectious in nature.  - budesonide    CV:  - goal MAP > 65 mmHg  - lactate clear  - Metoprolol 25 TID  - Dig 125 qd  - Dig level (11/26) - 0.9    GI:   - FEES 12/3: Minced and moist diet, moderately thick liquids, crushed pills one at a time  - Ileus  - Protonix for stress ulcer prophylaxis  - post-op liver doppler w/ patent vasculature   - Liver doppler 11/29: Portal venous gas visualized with c/f mesenteric ischemia.   - CT abd/pel 11/29: Dilated small and large bowel consistent with an ileus. No evidence of portal venous gas as questioned on liver Doppler.  - prednisone, cellcept, cyclosporine  - Linzess 145mcg QD    Renal:  - Monitor I&Os and electrolytes w/ repletions as necessary  - cardura  - sodium bicarb BID    Heme:  - Monitor CBC and coags  - Eliquis 5 BID  - ASA PO  - LUE duplex 11/27 superficial thrombophlebitis, neg DVT  - LUE duplex 12/3 with L brachial DVT    ID:   - Transplant ppx, therapeutic valcyte, mepron  - immunosuppression w/ steroids, cyclosporine  - Blood cx 11/20 NGTD, 11/22 NGTD, 11/24 NGTD  - BAL 11/24 - candida glabrata (BAL fungitell >500)   - Serum fungitell neg     Endo:   - Monitor glucose   - moderate ISS  - prednisone  - post-transplant steroid taper

## 2024-12-06 ENCOUNTER — RESULT REVIEW (OUTPATIENT)
Age: 74
End: 2024-12-06

## 2024-12-06 NOTE — PROGRESS NOTE ADULT - NS ATTEND AMEND GEN_ALL_CORE FT
Febrile and new onset leukopenia. Now with pressor requirements and lactic acidosis.  Craig CT yesterday without evidence of significant pneumonia.  dilated bowel but no signs of colitis, free fluid or free air.  Plan as outlined and will follow closely.  If further deterioration, will proceed to OR for exploration given no other source for this apparent sepsis.

## 2024-12-06 NOTE — CHART NOTE - NSCHARTNOTEFT_GEN_A_CORE
NUTRITION FOLLOW UP NOTE    PATIENT SEEN FOR: consult - "Alfredo large sacral wound"    SOURCE: [] Patient  [x] Current Medical Record  [] RN  [x] Family/support person at bedside  [] Patient unavailable/inappropriate  [x] Other: Team     CHART REVIEWED/EVENTS NOTED.  [x] Nutrition Status:    DIET ORDER:   Diet, NPO:   Except Medications (12-05-24)      CURRENT DIET ORDER IS:  [] Appropriate:  [] Inadequate:  [] Other:    NUTRITION INTAKE/PROVISION:  [] PO:  [] Enteral Nutrition:  [] Parenteral Nutrition:    ANTHROPOMETRICS:  Drug Dosing Weight  Height (cm): 182.9 (15 Nov 2024 05:30)  Weight (kg): 93.6 (15 Nov 2024 05:30)  BMI (kg/m2): 28 (15 Nov 2024 05:30)  BSA (m2): 2.16 (15 Nov 2024 05:30)  Weights:   Daily      NUTRITIONALLY PERTINENT MEDICATIONS:  MEDICATIONS  (STANDING):  atovaquone  Suspension  digoxin  Injectable  insulin glargine Injectable (LANTUS)  insulin lispro (ADMELOG) corrective regimen sliding scale  linezolid  IVPB  methylnaltrexone Injectable  metoprolol tartrate  multiple electrolytes Injection Type 1  pantoprazole  Injectable  piperacillin/tazobactam IVPB.-  piperacillin/tazobactam IVPB.-  predniSONE   Tablet  simethicone       NUTRITIONALLY PERTINENT LABS:  12-06 Na142 mmol/L Glu 264 mg/dL[H] K+ 3.7 mmol/L Cr  1.56 mg/dL[H] BUN 53 mg/dL[H] 12-06 Phos 2.1 mg/dL[L] 12-06 Alb 2.1 g/dL[L]12-06 ALT 28 U/L AST 15 U/L Alkaline Phosphatase 95 U/L            Finger Sticks:  POCT Blood Glucose.: 149 mg/dL (12-06 @ 05:23)  POCT Blood Glucose.: 175 mg/dL (12-06 @ 02:12)  POCT Blood Glucose.: 142 mg/dL (12-06 @ 00:42)  POCT Blood Glucose.: 151 mg/dL (12-05 @ 23:41)  POCT Blood Glucose.: 152 mg/dL (12-05 @ 22:43)  POCT Blood Glucose.: 138 mg/dL (12-05 @ 21:42)  POCT Blood Glucose.: 138 mg/dL (12-05 @ 20:38)  POCT Blood Glucose.: 102 mg/dL (12-05 @ 19:32)  POCT Blood Glucose.: 117 mg/dL (12-05 @ 18:33)  POCT Blood Glucose.: 138 mg/dL (12-05 @ 17:35)  POCT Blood Glucose.: 150 mg/dL (12-05 @ 16:31)  POCT Blood Glucose.: 161 mg/dL (12-05 @ 15:34)  POCT Blood Glucose.: 192 mg/dL (12-05 @ 14:32)  POCT Blood Glucose.: 180 mg/dL (12-05 @ 13:44)      NUTRITIONALLY PERTINENT MEDICATIONS/LABS:  [x] Reviewed  [] Relevant notes on medications/labs:    EDEMA:  [x] Reviewed  [] Relevant notes:    GI/ I&O:  [x] Reviewed  [] Relevant notes:  [] Other:    SKIN:   [] No pressure injuries documented, per nursing flowsheet  [] Pressure injury previously noted  [] Change in pressure injury documentation:  [] Other:    ESTIMATED NEEDS:  [] No change:  [] Updated:  Energy:   kcal/day (xx-xx kcal/kg)  Protein:   g/day (xx-xx g/kg)  Fluid:   ml/day or [] defer to team  Based on:    NUTRITION DIAGNOSIS:  [] Prior Dx:  [] New Dx:    EDUCATION:  [] Yes:  [] Not appropriate/warranted    NUTRITION CARE PLAN:  1. Diet:  2. Supplements:  3. Multivitamin/mineral supplementation:  4:     [] Achieved - Continue current nutrition intervention(s)  [] Current medical condition precludes nutrition intervention at this time.    MONITORING AND EVALUATION:   RD remains available upon request and will follow up per protocol.    Name  Available on [facility] TEAMS NUTRITION FOLLOW UP NOTE    PATIENT SEEN FOR: consult - "Alfredo large sacral wound"    SOURCE: [] Patient  [x] Current Medical Record  [] RN  [x] Family/support person at bedside  [] Patient unavailable/inappropriate  [x] Other: Team     CHART REVIEWED/EVENTS NOTED.  [x] Nutrition Status:  -S/p OLT 11/15  -Worsening mental status  -Ileus; now NPO   -NGT to suction   -SLP could not follow up on 12/5 in setting of NPO status       DIET ORDER:   Diet, NPO:   Except Medications (12-05-24)    NUTRITION INTAKE/PROVISION:  [x] PO:  -Varying PO prior to NPO   -Family provided assistance with encouraging PO intake   -Would sip on Ensure shakes     ANTHROPOMETRICS:  Drug Dosing Weight  Height (cm): 182.9 (15 Nov 2024 05:30)  Weight (kg): 93.6 (15 Nov 2024 05:30)  BMI (kg/m2): 28 (15 Nov 2024 05:30)    NUTRITIONALLY PERTINENT MEDICATIONS:  MEDICATIONS  (STANDING):  atovaquone  Suspension  digoxin  Injectable  insulin glargine Injectable (LANTUS)  insulin lispro (ADMELOG) corrective regimen sliding scale  linezolid  IVPB  methylnaltrexone Injectable  metoprolol tartrate  multiple electrolytes Injection Type 1  pantoprazole  Injectable  piperacillin/tazobactam IVPB.-  piperacillin/tazobactam IVPB.-  predniSONE   Tablet  simethicone       NUTRITIONALLY PERTINENT LABS:  12-06 Na142 mmol/L Glu 264 mg/dL[H] K+ 3.7 mmol/L Cr  1.56 mg/dL[H] BUN 53 mg/dL[H] 12-06 Phos 2.1 mg/dL[L] 12-06 Alb 2.1 g/dL[L]12-06 ALT 28 U/L AST 15 U/L Alkaline Phosphatase 95 U/L            Finger Sticks:  POCT Blood Glucose.: 149 mg/dL (12-06 @ 05:23)  POCT Blood Glucose.: 175 mg/dL (12-06 @ 02:12)  POCT Blood Glucose.: 142 mg/dL (12-06 @ 00:42)  POCT Blood Glucose.: 151 mg/dL (12-05 @ 23:41)  POCT Blood Glucose.: 152 mg/dL (12-05 @ 22:43)  POCT Blood Glucose.: 138 mg/dL (12-05 @ 21:42)  POCT Blood Glucose.: 138 mg/dL (12-05 @ 20:38)  POCT Blood Glucose.: 102 mg/dL (12-05 @ 19:32)  POCT Blood Glucose.: 117 mg/dL (12-05 @ 18:33)  POCT Blood Glucose.: 138 mg/dL (12-05 @ 17:35)  POCT Blood Glucose.: 150 mg/dL (12-05 @ 16:31)  POCT Blood Glucose.: 161 mg/dL (12-05 @ 15:34)  POCT Blood Glucose.: 192 mg/dL (12-05 @ 14:32)  POCT Blood Glucose.: 180 mg/dL (12-05 @ 13:44)      NUTRITIONALLY PERTINENT MEDICATIONS/LABS:  [x] Reviewed  [x] Relevant notes on medications/labs:  -Prednisone   -Relistor  -Mylicon     EDEMA:  [x] Reviewed  [] Relevant notes:    GI/ I&O:  [x] Reviewed  -Last BM documented 12/6  -NGT to suction --> 100cc out thus far (12/6)     SKIN:   [x] Pressure injury previously noted  -sacral spine: suspected deep tissue injury    ESTIMATED NEEDS:  [x] No change:  Energy:   2178-2582kcal/day (27-32 kcal/kg)  Protein:   97-121g/day (1.2-1.5 g/kg)  Fluid:   ml/day or [x] defer to team  Based on: 80.7kg     NUTRITION DIAGNOSIS:  1. Increased protein-energy needs   2. Severe malnutrition (Acute)    EDUCATION:  [] Yes:  [x] Not appropriate/warranted    NUTRITION CARE PLAN:  1. Diet: defer to Team   -Per SICU Team, not TPN candidate at this time in setting of Sepsis  -? ability to trial trickle feeds --> recommend Vital 1.5 @ trickle feeds   2. Supplements: add Alfredo BID when medically feasible   3. Multivitamin/mineral supplementation: [id number]+D, vitamin C, multivitamin     MONITORING AND EVALUATION:   RD remains available upon request and will follow up per protocol.    aMlorie Pike, MS, RDN, CDN (Teams)   Available on [facility] TEAMS

## 2024-12-06 NOTE — CONSULT NOTE ADULT - TIME BILLING
clinical exam, review of labs, review of recent imaging and discussion of assessment and plan with other team members and ICU team.

## 2024-12-06 NOTE — PROCEDURE NOTE - SUPERVISORY STATEMENT
Agree with above note, difficult arterial access due to critical illness (multiple prior procedures, vasopressor requirements, anasarca, etc.) left femoral artery accessed using sterile technique via Seldinger technique in one attempt under US guidance by myself, with return of arterial looking blood and good correlation with cuff and adequate arterial shape of wave form, pending ABG sampling results. No apparent complications, will monitor puncture sites and distal perfusion.

## 2024-12-06 NOTE — PROGRESS NOTE ADULT - SUBJECTIVE AND OBJECTIVE BOX
HPI:  Patient seen and examined at bedside in SICU.  Appears septic with fever and tachycardia. Unclear source of infection.    Review Of Systems:         Unable to assess    Medications:  albumin human  5% IVPB 250 milliLiter(s) IV Intermittent once  albuterol/ipratropium for Nebulization 3 milliLiter(s) Nebulizer every 6 hours PRN  aspirin  chewable 81 milliGRAM(s) Oral daily  atovaquone  Suspension 1500 milliGRAM(s) Enteral Tube daily  bacitracin   Ointment 1 Application(s) Topical two times a day PRN  buDESOnide    Inhalation Suspension 0.5 milliGRAM(s) Inhalation two times a day  calcium gluconate IVPB 2 Gram(s) IV Intermittent once  caspofungin IVPB      chlorhexidine 2% Cloths 1 Application(s) Topical <User Schedule>  digoxin  Injectable 125 MICROGram(s) IV Push daily  insulin glargine Injectable (LANTUS) 6 Unit(s) SubCutaneous at bedtime  insulin lispro (ADMELOG) corrective regimen sliding scale   SubCutaneous every 4 hours  levETIRAcetam  IVPB 250 milliGRAM(s) IV Intermittent every 12 hours  lidocaine   4% Patch 1 Patch Transdermal every 24 hours  linezolid  IVPB 600 milliGRAM(s) IV Intermittent every 12 hours  meropenem  IVPB 1000 milliGRAM(s) IV Intermittent every 12 hours  norepinephrine Infusion 1.6 MICROgram(s)/kG/Min IV Continuous <Continuous>  nystatin Powder 1 Application(s) Topical three times a day  pantoprazole  Injectable 40 milliGRAM(s) IV Push daily  phenylephrine    Infusion 5.2 MICROgram(s)/kG/Min IV Continuous <Continuous>  QUEtiapine 50 milliGRAM(s) Oral at bedtime  QUEtiapine 12.5 milliGRAM(s) Oral every 8 hours PRN  simethicone 80 milliGRAM(s) Chew every 8 hours  sodium bicarbonate  Infusion 0.2 mEq/kG/Hr IV Continuous <Continuous>  vasopressin Infusion 0.1 Unit(s)/Min IV Continuous <Continuous>    PAST MEDICAL & SURGICAL HISTORY:  Diabetes      Transaminitis      Paroxysmal atrial fibrillation      Depression      BPH (benign prostatic hyperplasia)      Hypertension      Chronic atrial fibrillation      Coronary artery disease      Hepatocellular carcinoma      DM (diabetes mellitus)      HTN (hypertension)      Paroxysmal atrial fibrillation      Cirrhosis      HCC (hepatocellular carcinoma)      History of BPH      History of laparoscopic cholecystectomy      History of lumbar laminectomy      H/O prior ablation treatment      H/O percutaneous left heart catheterization        Vitals:  T(C): 38.3 (12-06-24 @ 23:00), Max: 38.7 (12-06-24 @ 06:00)  HR: 104 (12-06-24 @ 23:05) (104 - 144)  BP: 92/42 (12-06-24 @ 21:00) (60/35 - 186/131)  BP(mean): 60 (12-06-24 @ 21:00) (42 - 148)  RR: 36 (12-06-24 @ 23:00) (20 - 47)  SpO2: 100% (12-06-24 @ 23:05) (91% - 100%)  Wt(kg): --  Daily     Daily   I&O's Summary    05 Dec 2024 07:01  -  06 Dec 2024 07:00  --------------------------------------------------------  IN: 4151.3 mL / OUT: 995 mL / NET: 3156.3 mL    06 Dec 2024 07:01  -  06 Dec 2024 23:56  --------------------------------------------------------  IN: 8476 mL / OUT: 770 mL / NET: 7706 mL        Physical Exam:  Appearance: No acute distress; well appearing  Eyes: PERRL, EOMI, pink conjunctiva  HENT: Normal oral mucosa  Cardiovascular: tachycardia  Respiratory: Clear to auscultation bilaterally  Gastrointestinal: soft, non-tender, non-distended with normal bowel sounds  Musculoskeletal: No clubbing; no joint deformity   Neurologic: Non-focal  Lymphatic: No lymphadenopathy                          9.8    1.39  )-----------( 187      ( 06 Dec 2024 23:38 )             30.1     12-06    144  |  111[H]  |  55[H]  ----------------------------<  111[H]  3.4[L]   |  13[L]  |  1.82[H]    Ca    7.3[L]      06 Dec 2024 19:47  Phos  3.7     12-06  Mg     1.8     12-06    TPro  3.8[L]  /  Alb  2.3[L]  /  TBili  0.7  /  DBili  x   /  AST  59[H]  /  ALT  39  /  AlkPhos  69  12-06    PT/INR - ( 06 Dec 2024 19:47 )   PT: 29.1 sec;   INR: 2.58 ratio         PTT - ( 06 Dec 2024 19:47 )  PTT:38.7 sec      Cardiovascular Diagnostic Testing:  ECG: sinus rhythm    Echo: < from: Intra-Operative Transesophageal Echo W or WO Ultrasound Enhancing Agent (11.15.24 @ 07:46) >  --------------------------------------------------------------------------------  PRE-BYPASS FINDINGS     Left Ventricle:  Left ventricular ejection fraction is estimated at 60 to 65%. Normal left ventricularwall thickness. The left ventricular systolic function is normal There are no regional wall motion abnormalities seen.     Right Ventricle:  The right ventricular cavity is normal in size, with normal wall thickness and right ventricular systolic function is normal. Right ventricular systolic function is normal.     Left Atrium:  The left atrium is normal in size. No thrombus visualized in left atrial appendage.     Right Atrium:  The right atrium is normal in size. The right atrium is normal in size.     Interatrial Septum:  No PFO visualized with color flow doppler.     Aortic Valve:  The aortic valve appears trileaflet. There is no aortic valve stenosis. There is trace aortic regurgitation.     Mitral Valve:  There is no mitral valve stenosis. There is no mitral valve stenosis. There is trace mitral regurgitation.     Tricuspid Valve:  There is no evidence of tricuspid stenosis. There is trace tricuspid regurgitation.     Pericardium:  No pericardial effusion seen.     Post-Bypass:  S/P OLT. Under current loading conditions, hyperdynamic left ventricular systolic function, EF: 70-75% by visual estimate, no RWMA. Normal right ventricular systolic function. All other findings unchanged. Probe removed atraumatically, no blood. The patient tolerated the procedure well and without complications. Permanent recorded images are stored in the medical record.     Electronically signed by James Villareal on 11/15/2024 at 2:18:16 PM         *** Final ***    < end of copied text >      Stress Testing:     Cath: 4/24/2024, patient had his LHC repeated with Ben Villareal MD at [facility].  Cath showed multivessel coronary artery disease, but the lesions previously noted were FFR negative.  He continues to have MIRTA 3 flow throughout.    Interpretation of Telemetry: Sinus     Imaging:

## 2024-12-06 NOTE — CONSULT NOTE ADULT - ASSESSMENT
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 Post-op course c/b worsening mental status and acute hypoxic respiratory failure requiring multiple intubations/extubations, currently extubated (as of 11/26/24) and colonic ileus s/p several rounds of Relistor and Neostigmine with NGT and rectal tube currently in place now with septic shock of unclear etiology. Transplant nephrology consulted for metabolic acidosis and MORAIMA.     1. s/p OLT 11/15/24 with oliguric MORAIMA in setting of septic shock.  - Likely hemodynamically mediated in setting of fever/septic shock on multiple vasopressors   - SCr on admission was 0.48 11/15/24, now uptrended to 1.90.  - UOP today is 145 cc, ~10-15 cc/hr, and patient is net + 5L  - Urinalysis trace ketones, protein 30, 17 casts.   - CT AP non-con: Bilateral renal cysts including cysts with peripheral calcification in the left kidney.  - Given worsening acidosis, hypotension on vasopressors, oliguria, and signs of overload on exam, recommend CRRT at this time. Discussed with transplant hepatology team.   - Discussed with the patient's HCP (wife and son) that he will require RRT/HD, risks and benefits associated with RRT/HD explained at length. HD consent obtained and kept in patients chart.   - Recommend non-tunneled HD catheter placement   - CRRT to start tonight as tolerated.   - Transplant ID consulted for sepsis. Pt on linezolid, meropenem caspofungin   - IS per transplant hepatology team   - Monitor labs and BPs. Avoid nephrotoxins including, ACE/ARB, NSAIDs, contrast, etc.     2. Metabolic Acidosis   - AGMA in setting of septic shock and MORAIMA.  - pH 7.30, pCO2 23. SCO2 10. on 12/6/24.   - Lactate elevated at 6.7   - Continue on bicarbonate gtt     Discussed with attending, Dr. Diaz.    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 Post-op course c/b worsening mental status and acute hypoxic respiratory failure requiring multiple intubations/extubations, currently extubated (as of 11/26/24) and colonic ileus s/p several rounds of Relistor and Neostigmine with NGT and rectal tube currently in place now with septic shock of unclear etiology. Transplant nephrology consulted for metabolic acidosis and MORAIMA.     1. s/p OLT 11/15/24 with oliguric MORAIMA in setting of septic shock.  - Likely hemodynamically mediated in setting of fever/septic shock on multiple vasopressors   - SCr on admission was 0.48 11/15/24, now uptrended to 1.90.  - UOP today is 145 cc, ~10-15 cc/hr, and patient is net + 5L  - Urinalysis trace ketones, protein 30, 17 casts.   - CT AP non-con: Bilateral renal cysts including cysts with peripheral calcification in the left kidney.  - Given worsening acidosis, hypotension on vasopressors, oliguria, and signs of overload on exam, recommend CRRT at this time. Discussed with transplant hepatology team, would like to hold off for now. Please re-call for CRRT if needed.  - Discussed with the patient's HCP (wife and son) that he will require RRT/HD, risks and benefits associated with RRT/HD explained at length. HD consent obtained and kept in patients chart.   - Transplant ID consulted for sepsis. Pt on linezolid, meropenem caspofungin   - IS per transplant hepatology team   - Monitor labs and BPs. Avoid nephrotoxins including, ACE/ARB, NSAIDs, contrast, etc.     2. Metabolic Acidosis   - AGMA in setting of septic shock and MORAIMA.  - pH 7.30, pCO2 23. SCO2 10. on 12/6/24.   - Lactate elevated at 6.7   - Continue on bicarbonate gtt     Discussed with attending, Dr. Diaz.    If you have any questions, please feel free to contact me:  Magaly Tello MD PGY-4  Nephrology Fellow  Pager [phone] / [employer] Teams (Preferred)  (After 5pm or on weekends please page the on-call fellow)

## 2024-12-06 NOTE — PROGRESS NOTE ADULT - SUBJECTIVE AND OBJECTIVE BOX
Transplant Surgery - Multidisciplinary Rounds  --------------------------------------------------------------  OLT   11/15/2024        POD#21    Present:   Patient seen and examined with multidisciplinary Transplant team including Surgeon: Dr. Dagher, Dr. Sorto, JAZZ Colby/[facility], Hepatologist: Dr. Luis, SICU team in AM rounds.   Disciplines not in attendance will be notified of the plan.     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     s/p OLT 11/15 with post op course c/b:  ·	Hypoxia: Reintubated 11/20, extubated 11/24->reintubated 11/24, extubated 11/26 (aspiration PNA, steroid psychosis)  ·	Ileus   ·	Fever  ·	A fib  ·	AMS/Seizure   ·	L brachial DVT    Interval Events:  - NGT, rectal tube reinserted for ileus    Immunosuppression:  -Cyclo (held), MMF 500mg BID, pred 20  -ongoing monitoring for signs of rejection  Potential Discharge date: Pending clinical course     MEDICATIONS  (STANDING):  apixaban 5 milliGRAM(s) Enteral Tube every 12 hours  aspirin  chewable 81 milliGRAM(s) Oral daily  atovaquone  Suspension 1500 milliGRAM(s) Enteral Tube daily  buDESOnide    Inhalation Suspension 0.5 milliGRAM(s) Inhalation two times a day  chlorhexidine 2% Cloths 1 Application(s) Topical <User Schedule>  dextrose 5% 1000 milliLiter(s) (84 mL/Hr) IV Continuous <Continuous>  digoxin  Injectable 125 MICROGram(s) IV Push daily  insulin glargine Injectable (LANTUS) 6 Unit(s) SubCutaneous at bedtime  insulin lispro (ADMELOG) corrective regimen sliding scale   SubCutaneous every 4 hours  levETIRAcetam  IVPB 250 milliGRAM(s) IV Intermittent every 12 hours  lidocaine   4% Patch 1 Patch Transdermal every 24 hours  methylnaltrexone Injectable 12 milliGRAM(s) SubCutaneous every other day  metoprolol tartrate 50 milliGRAM(s) Enteral Tube every 8 hours  mycophenolate mofetil Suspension 500 milliGRAM(s) Enteral Tube <User Schedule>  nystatin Powder 1 Application(s) Topical three times a day  pantoprazole  Injectable 40 milliGRAM(s) IV Push daily  predniSONE   Tablet 20 milliGRAM(s) Oral daily  QUEtiapine 50 milliGRAM(s) Oral at bedtime  simethicone 80 milliGRAM(s) Chew every 8 hours  valGANciclovir 50 mG/mL Oral Solution 900 milliGRAM(s) Oral daily    MEDICATIONS  (PRN):  acetaminophen   IVPB .. 500 milliGRAM(s) IV Intermittent every 6 hours PRN Mild Pain (1 - 3)  albuterol/ipratropium for Nebulization 3 milliLiter(s) Nebulizer every 6 hours PRN Shortness of Breath and/or Wheezing  bacitracin   Ointment 1 Application(s) Topical two times a day PRN abrasions  QUEtiapine 12.5 milliGRAM(s) Oral every 8 hours PRN Anxiety      PAST MEDICAL & SURGICAL HISTORY:  Diabetes      Transaminitis      Paroxysmal atrial fibrillation      Depression      BPH (benign prostatic hyperplasia)      Hypertension      Chronic atrial fibrillation      Coronary artery disease      Hepatocellular carcinoma      DM (diabetes mellitus)      HTN (hypertension)      Paroxysmal atrial fibrillation      Cirrhosis      HCC (hepatocellular carcinoma)      History of BPH      History of laparoscopic cholecystectomy      History of lumbar laminectomy      H/O prior ablation treatment      H/O percutaneous left heart catheterization          Vital Signs Last 24 Hrs  T(C): 37.1 (05 Dec 2024 22:00), Max: 37.5 (05 Dec 2024 15:00)  T(F): 98.8 (05 Dec 2024 22:00), Max: 99.5 (05 Dec 2024 15:00)  HR: 121 (06 Dec 2024 01:00) (85 - 121)  BP: 181/80 (06 Dec 2024 01:00) (91/55 - 181/80)  BP(mean): 115 (06 Dec 2024 01:00) (66 - 115)  RR: 24 (06 Dec 2024 01:00) (17 - 38)  SpO2: 99% (06 Dec 2024 01:00) (94% - 100%)    Parameters below as of 05 Dec 2024 23:00  Patient On (Oxygen Delivery Method): nasal cannula  O2 Flow (L/min): 2      I&O's Summary    04 Dec 2024 07:01  -  05 Dec 2024 07:00  --------------------------------------------------------  IN: 1056 mL / OUT: 650 mL / NET: 406 mL    05 Dec 2024 07:01  -  06 Dec 2024 01:25  --------------------------------------------------------  IN: 3314 mL / OUT: 645 mL / NET: 2669 mL                              10.8   11.13 )-----------( 175      ( 05 Dec 2024 06:25 )             36.0     12-05    146[H]  |  115[H]  |  58[H]  ----------------------------<  140[H]  3.0[L]   |  18[L]  |  1.66[H]    Ca    7.2[L]      05 Dec 2024 21:10  Phos  3.4     12-05  Mg     2.3     12-05    TPro  3.8[L]  /  Alb  2.3[L]  /  TBili  0.5  /  DBili  x   /  AST  22  /  ALT  27  /  AlkPhos  100  12-05      Review of systems  All other systems were reviewed and are negative, except as noted.      PHYSICAL EXAM:  Constitutional: NAD  Eyes:  PERRLA  ENMT: nc/at, no thrush   Neck: supple   Respiratory: CTA B/L  Cardiovascular: RRR  Gastrointestinal: softly distended--improving, appropriate incisional TTP. chevron incision c/d/i, staples in place. No signs of infection.   Genitourinary: Voiding,   Extremities: SCD's in place and working bilaterally, + LE edema  Neurological: A&O x3  Skin: no rashes, ulcerations, lesions Transplant Surgery - Multidisciplinary Rounds  --------------------------------------------------------------  OLT   11/15/2024        POD#21    Present:   Patient seen and examined with multidisciplinary Transplant team including Surgeon: Dr. Dagher, Dr. Sorto, JAZZ Colby/[facility], Hepatologist: Dr. Luis, SICU team in AM rounds.   Disciplines not in attendance will be notified of the plan.     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     s/p OLT 11/15 with post op course c/b:  ·	Hypoxia: Reintubated 11/20, extubated 11/24->reintubated 11/24, extubated 11/26   ·	Ileus   ·	Fever  ·	A fib  ·	AMS/Seizure   ·	L brachial DVT  ·	Neutropenia    Interval Events:  - worsening abd distention yest, CT a/p with small/large bowel distention. NGT/Rectal tube/Castro placed.    - Worsening mental status in the afternoon, held cyclo, CT head neg. Ammonia undetectable, US with patent portal vein  - This am with improving abd distention and mental status  - Febrile overnight, neutropenic. Lactate ~2    Immunosuppression:  -Cyclo (held), MMF held this am, pred 20  -ongoing monitoring for signs of rejection  Potential Discharge date: Pending clinical course     MEDICATIONS  (STANDING):  albumin human  5% IVPB 500 milliLiter(s) IV Intermittent once  apixaban 5 milliGRAM(s) Enteral Tube every 12 hours  aspirin  chewable 81 milliGRAM(s) Oral daily  atovaquone  Suspension 1500 milliGRAM(s) Enteral Tube daily  buDESOnide    Inhalation Suspension 0.5 milliGRAM(s) Inhalation two times a day  chlorhexidine 2% Cloths 1 Application(s) Topical <User Schedule>  digoxin  Injectable 125 MICROGram(s) IV Push daily  filgrastim-sndz (ZARXIO) Injectable 480 MICROGram(s) SubCutaneous once  insulin glargine Injectable (LANTUS) 6 Unit(s) SubCutaneous at bedtime  insulin lispro (ADMELOG) corrective regimen sliding scale   SubCutaneous every 4 hours  levETIRAcetam  IVPB 250 milliGRAM(s) IV Intermittent every 12 hours  lidocaine   4% Patch 1 Patch Transdermal every 24 hours  linezolid  IVPB 600 milliGRAM(s) IV Intermittent every 12 hours  methylnaltrexone Injectable 12 milliGRAM(s) SubCutaneous every other day  metoprolol tartrate 50 milliGRAM(s) Enteral Tube every 8 hours  multiple electrolytes Injection Type 1 1000 milliLiter(s) (84 mL/Hr) IV Continuous <Continuous>  multiple electrolytes Injection Type 1 Bolus 1000 milliLiter(s) IV Bolus once  norepinephrine Infusion 0.05 MICROgram(s)/kG/Min (8.78 mL/Hr) IV Continuous <Continuous>  nystatin Powder 1 Application(s) Topical three times a day  pantoprazole  Injectable 40 milliGRAM(s) IV Push daily  piperacillin/tazobactam IVPB.- 3.375 Gram(s) IV Intermittent once  predniSONE   Tablet 20 milliGRAM(s) Oral daily  QUEtiapine 50 milliGRAM(s) Oral at bedtime  simethicone 80 milliGRAM(s) Chew every 8 hours  tobramycin IVPB 650 milliGRAM(s) IV Intermittent once  vasopressin Infusion 0.03 Unit(s)/Min (4.5 mL/Hr) IV Continuous <Continuous>    MEDICATIONS  (PRN):  acetaminophen   IVPB .. 500 milliGRAM(s) IV Intermittent every 6 hours PRN Mild Pain (1 - 3)  albuterol/ipratropium for Nebulization 3 milliLiter(s) Nebulizer every 6 hours PRN Shortness of Breath and/or Wheezing  bacitracin   Ointment 1 Application(s) Topical two times a day PRN abrasions  QUEtiapine 12.5 milliGRAM(s) Oral every 8 hours PRN Anxiety      PAST MEDICAL & SURGICAL HISTORY:  Diabetes  Transaminitis  Paroxysmal atrial fibrillation  Depression  BPH (benign prostatic hyperplasia)  Hypertension  Chronic atrial fibrillation  Coronary artery disease  Hepatocellular carcinoma  DM (diabetes mellitus)  HTN (hypertension)  Paroxysmal atrial fibrillation  Cirrhosis  HCC (hepatocellular carcinoma)  History of BPH  History of laparoscopic cholecystectomy  History of lumbar laminectomy  H/O prior ablation treatment  H/O percutaneous left heart catheterization    Vital Signs Last 24 Hrs  T(C): 38.2 (06 Dec 2024 12:00), Max: 38.7 (06 Dec 2024 06:00)  T(F): 100.8 (06 Dec 2024 12:00), Max: 101.7 (06 Dec 2024 06:00)  HR: 126 (06 Dec 2024 12:45) (85 - 133)  BP: 69/30 (06 Dec 2024 12:45) (60/35 - 181/80)  BP(mean): 44 (06 Dec 2024 12:45) (42 - 115)  RR: 28 (06 Dec 2024 12:45) (17 - 36)  SpO2: 97% (06 Dec 2024 12:45) (92% - 100%)    Parameters below as of 06 Dec 2024 08:00  Patient On (Oxygen Delivery Method): nasal cannula  O2 Flow (L/min): 2      I&O's Summary    05 Dec 2024 07:01  -  06 Dec 2024 07:00  --------------------------------------------------------  IN: 4151.3 mL / OUT: 995 mL / NET: 3156.3 mL    06 Dec 2024 07:01  -  06 Dec 2024 13:07  --------------------------------------------------------  IN: 895 mL / OUT: 95 mL / NET: 800 mL                        9.9    0.84  )-----------( 149      ( 06 Dec 2024 08:11 )             33.3     12-06    142  |  113[H]  |  53[H]  ----------------------------<  264[H]  3.7   |  17[L]  |  1.56[H]    Ca    7.4[L]      06 Dec 2024 05:17  Phos  2.1     12-06  Mg     2.1     12-06    TPro  3.9[L]  /  Alb  2.1[L]  /  TBili  0.7  /  DBili  x   /  AST  15  /  ALT  28  /  AlkPhos  95  12-06    Culture - Stool (collected 12-01-24 @ 08:20)  Source: .Stool  Final Report (12-03-24 @ 17:39):    No enteric pathogens isolated.    (Stool culture examined for Salmonella,    Shigella, Campylobacter, Aeromonas, Plesiomonas,    Vibrio, E.coli O157 and Yersinia)    Review of systems  All other systems were reviewed and are negative, except as noted.      PHYSICAL EXAM:  Constitutional: NAD  Eyes:  PERRLA  ENMT: nc/at, no thrush   Neck: supple   Respiratory: CTA B/L  Cardiovascular: RRR  Gastrointestinal: softly distended--improving, appropriate incisional TTP. chevron incision c/d/i, staples in place. No signs of infection.   Genitourinary: Voiding,   Extremities: SCD's in place and working bilaterally, + LE edema  Neurological: A&O x3  Skin: no rashes, ulcerations, lesions

## 2024-12-06 NOTE — PROGRESS NOTE ADULT - SUBJECTIVE AND OBJECTIVE BOX
Follow Up:  fever    Interval History: febrile this am  No pressor requirements but soft BP and tachycardic,  Ct C/A/p with ileus and LLL mucus plug noted          Vital Signs Last 24 Hrs  T(C): 38.3 (06 Dec 2024 09:00), Max: 38.7 (06 Dec 2024 06:00)  T(F): 100.9 (06 Dec 2024 09:00), Max: 101.7 (06 Dec 2024 06:00)  HR: 117 (06 Dec 2024 09:00) (85 - 133)  BP: 116/69 (06 Dec 2024 09:00) (94/47 - 181/80)  BP(mean): 85 (06 Dec 2024 09:00) (68 - 115)  RR: 20 (06 Dec 2024 09:00) (17 - 34)  SpO2: 92% (06 Dec 2024 09:00) (92% - 100%)    Parameters below as of 06 Dec 2024 08:00  Patient On (Oxygen Delivery Method): nasal cannula  O2 Flow (L/min): 2      PHYSICAL EXAMINATION:  General: Alert and Awake, NAD  Cardiac: RRR, No M/R/G  Resp: CTAB, No Wh/Rh/Ra  Abdomen: NBS, NT/ND, No HSM, No rigidity or guarding  MSK: No LE edema. No Calf tenderness  Skin: No rashes or lesions. Skin is warm and dry to the touch.   Neuro: Alert and Awake. CN 2-12 Grossly intact. Moves all four extremities spontaneously.  Psych: Encephalopathic - unable to assess                     ____________________________________________________  ROS  GENERAL: denies chills, , night sweats, weight loss.   PSYCH: denies depression, anxiety, suicidal ideation, hallucination, and delusions  SKIN: no rash or lesions; no color changes, no abnormal nevi,no  dryness, and nojaundice    EYES: denies visual changes, floaters, pain, inflammation, blurred vision, and discharge  ENT: denies tinnitus, vertigo, epistaxis, oral lesion, and decreased acuity  PULM: denies, hemoptysis, pleurisy  CVS: denies angina, palpitations,+ orthopnea, no syncope, or heart murmur  GI: denies constipation, diarrhea, melena, abdominal pain, nausea.   : denies dysuria, frequency, discharge, incontinence, stones or macroscopic hematuria  MS: no arthralgias, no erythema or swelling, no myalgias, noedema, or lower back pain.   CNS: denies numbness, dizziness, seizure, or tremor  ENDO: denies heat/cold intolerance, polyuria, polydipsia, malaise.    HEME: denies bruising, bleeding, lymphadenopathy, anemia, and calf pain    Allergies  No Known Allergies    __________________________________________________  MEDS:  MEDICATIONS  (STANDING):  acetaminophen   IVPB .. 500 every 6 hours PRN  albuterol/ipratropium for Nebulization 3 every 6 hours PRN  apixaban 5 every 12 hours  aspirin  chewable 81 daily  buDESOnide    Inhalation Suspension 0.5 two times a day  digoxin  Injectable 125 daily  filgrastim-sndz (ZARXIO) Injectable 480 once  insulin glargine Injectable (LANTUS) 6 at bedtime  insulin lispro (ADMELOG) corrective regimen sliding scale  every 4 hours  levETIRAcetam  IVPB 250 every 12 hours  methylnaltrexone Injectable 12 every other day  metoprolol tartrate 50 every 8 hours  pantoprazole  Injectable 40 daily  predniSONE   Tablet 20 daily  QUEtiapine 50 at bedtime  QUEtiapine 12.5 every 8 hours PRN  simethicone 80 every 8 hours    _________________________________________________  ANTIMICOBIALS  atovaquone  Suspension 1500 daily  filgrastim-sndz (ZARXIO) Injectable 480 once  linezolid  IVPB 600 every 12 hours  piperacillin/tazobactam IVPB.- 3.375 once  piperacillin/tazobactam IVPB.- 3.375 once      GENERAL LABS              9.9                  x    | x    | x            0.84  >-----------< 149     ------------------------< x                     33.3                 x    | x    | x                                            Ca x     Mg x     Ph x          Urinalysis Basic - ( 06 Dec 2024 05:17 )    Color: x / Appearance: x / SG: x / pH: x  Gluc: 264 mg/dL / Ketone: x  / Bili: x / Urobili: x   Blood: x / Protein: x / Nitrite: x   Leuk Esterase: x / RBC: x / WBC x   Sq Epi: x / Non Sq Epi: x / Bacteria: x        _________________________________________________  MICROBIOLOGY  -----------          CMVPCR Log: Det <1.54 Assay Dynamic Range: 34.5 to 1.0E+07 IU/mL (1.54 to 7.00 Log10 IU/mL)  Assay lower limit of quantification (LLOQ) is 34.5 IU/mL (1.54 Log10  IU/mL)  CMV DNA detected below the LLOQ will be reported as Detected < 34.5 IU/mL  (<1.54 Log10 IU/mL)  Nydia Cytomegalovirus (CMV) is an FDA-cleared quantitative test that  enables the detection and quantitation of CMV DNA in EDTA plasma of  infected transplant patients on the nydia 8800 system. This test was  verified by [employer]. Results should be interpreted  with consideration of all clinical findings and laboratory findings and  should not form the sole basis for a diagnosis or treatment decision. [id number]/mL (12-03 @ 06:11)  CMVPCR Log: Det <1.54 Assay Dynamic Range: 34.5 to 1.0E+07 IU/mL (1.54 to 7.00 Log10 IU/mL)  Assay lower limit of quantification (LLOQ) is 34.5 IU/mL (1.54 Log10  IU/mL)  CMV DNA detected below the LLOQ will be reported as Detected < 34.5 IU/mL  (<1.54 Log10 IU/mL)  Nydia Cytomegalovirus (CMV) is an FDA-cleared quantitative test that  enables the detection and quantitation of CMV DNA in EDTA plasma of  infected transplant patients on the nydia 8800 system. This test was  verified by [employer]. Results should be interpreted  with consideration of all clinical findings and laboratory findings and  should not form the sole basis for a diagnosis or treatment decision. [id number]/mL (12-02 @ 18:19)    Clostridium difficile GDH Toxins A&amp;B, EIA:   Negative (12-01-24 @ 08:25)  Clostridium difficile GDH Interpretation: Negative for toxigenic C. Difficile.  This specimen is negative for C.  Difficile glutamate dehydrogenase (GDH) antigen and negative for C.  Difficile Toxins A & B, by EIA.  GDH is a highly sensitive screening  marker for C. Difficile that is produced in large amounts by all C.  Difficile strains, both toxigenic and nontoxigenic.  This assay has not  been validated as a test of cure.  Repeat testing during the same episode  of diarrhea is of limited value and is discouraged.  The results of this  assay should always be interpreted in conjunction with patient's clinical  history. (12-01-24 @ 08:25)        Fungitell:   _______________________________________________  PERTINENT IMAGING   Follow Up:  fever    Interval History: febrile this am  No pressor requirements but soft BP and tachycardic,  Ct C/A/p with ileus and LLL mucus plug noted  denies abd pain,   passing stools non bloody        Vital Signs Last 24 Hrs  T(C): 38.3 (06 Dec 2024 09:00), Max: 38.7 (06 Dec 2024 06:00)  T(F): 100.9 (06 Dec 2024 09:00), Max: 101.7 (06 Dec 2024 06:00)  HR: 117 (06 Dec 2024 09:00) (85 - 133)  BP: 116/69 (06 Dec 2024 09:00) (94/47 - 181/80)  BP(mean): 85 (06 Dec 2024 09:00) (68 - 115)  RR: 20 (06 Dec 2024 09:00) (17 - 34)  SpO2: 92% (06 Dec 2024 09:00) (92% - 100%)    Parameters below as of 06 Dec 2024 08:00  Patient On (Oxygen Delivery Method): nasal cannula  O2 Flow (L/min): 2      PHYSICAL EXAMINATION:  General: Alert and Awake, NAD  Cardiac: RRR, No M/R/G  Resp: CTAB, No Wh/Rh/Ra  Abdomen: NBS, NT/ND, No HSM, No rigidity or guarding  MSK: No LE edema. No Calf tenderness  Skin: No rashes or lesions. Skin is warm and dry to the touch.   Neuro: Alert and Awake. CN 2-12 Grossly intact. Moves all four extremities spontaneously.  Psych: alert                     ____________________________________________________  ROS  GENERAL: denies chills, , night sweats, weight loss.   PSYCH: denies depression, anxiety, suicidal ideation, hallucination, and delusions  SKIN: no rash or lesions; no color changes, no abnormal nevi,no  dryness, and nojaundice    EYES: denies visual changes, floaters, pain, inflammation, blurred vision, and discharge  ENT: denies tinnitus, vertigo, epistaxis, oral lesion, and decreased acuity  PULM: denies, hemoptysis, pleurisy  CVS: denies angina, palpitations,+ orthopnea, no syncope, or heart murmur  GI: denies constipation, diarrhea, melena, abdominal pain, nausea.   : denies dysuria, frequency, discharge, incontinence, stones or macroscopic hematuria  MS: no arthralgias, no erythema or swelling, no myalgias, noedema, or lower back pain.   CNS: denies numbness, dizziness, seizure, or tremor  ENDO: denies heat/cold intolerance, polyuria, polydipsia, malaise.    HEME: denies bruising, bleeding, lymphadenopathy, anemia, and calf pain    Allergies  No Known Allergies    __________________________________________________  MEDS:  MEDICATIONS  (STANDING):  acetaminophen   IVPB .. 500 every 6 hours PRN  albuterol/ipratropium for Nebulization 3 every 6 hours PRN  apixaban 5 every 12 hours  aspirin  chewable 81 daily  buDESOnide    Inhalation Suspension 0.5 two times a day  digoxin  Injectable 125 daily  filgrastim-sndz (ZARXIO) Injectable 480 once  insulin glargine Injectable (LANTUS) 6 at bedtime  insulin lispro (ADMELOG) corrective regimen sliding scale  every 4 hours  levETIRAcetam  IVPB 250 every 12 hours  methylnaltrexone Injectable 12 every other day  metoprolol tartrate 50 every 8 hours  pantoprazole  Injectable 40 daily  predniSONE   Tablet 20 daily  QUEtiapine 50 at bedtime  QUEtiapine 12.5 every 8 hours PRN  simethicone 80 every 8 hours    _________________________________________________  ANTIMICOBIALS  atovaquone  Suspension 1500 daily  filgrastim-sndz (ZARXIO) Injectable 480 once  linezolid  IVPB 600 every 12 hours  piperacillin/tazobactam IVPB.- 3.375 once  piperacillin/tazobactam IVPB.- 3.375 once      GENERAL LABS              9.9                  x    | x    | x            0.84  >-----------< 149     ------------------------< x                     33.3                 x    | x    | x                                            Ca x     Mg x     Ph x          Urinalysis Basic - ( 06 Dec 2024 05:17 )    Color: x / Appearance: x / SG: x / pH: x  Gluc: 264 mg/dL / Ketone: x  / Bili: x / Urobili: x   Blood: x / Protein: x / Nitrite: x   Leuk Esterase: x / RBC: x / WBC x   Sq Epi: x / Non Sq Epi: x / Bacteria: x        _________________________________________________  MICROBIOLOGY  -----------          CMVPCR Log: Det <1.54 Assay Dynamic Range: 34.5 to 1.0E+07 IU/mL (1.54 to 7.00 Log10 IU/mL)  Assay lower limit of quantification (LLOQ) is 34.5 IU/mL (1.54 Log10  IU/mL)  CMV DNA detected below the LLOQ will be reported as Detected < 34.5 IU/mL  (<1.54 Log10 IU/mL)  Nydia Cytomegalovirus (CMV) is an FDA-cleared quantitative test that  enables the detection and quantitation of CMV DNA in EDTA plasma of  infected transplant patients on the nydia 8800 system. This test was  verified by [employer]. Results should be interpreted  with consideration of all clinical findings and laboratory findings and  should not form the sole basis for a diagnosis or treatment decision. [id number]/mL (12-03 @ 06:11)  CMVPCR Log: Det <1.54 Assay Dynamic Range: 34.5 to 1.0E+07 IU/mL (1.54 to 7.00 Log10 IU/mL)  Assay lower limit of quantification (LLOQ) is 34.5 IU/mL (1.54 Log10  IU/mL)  CMV DNA detected below the LLOQ will be reported as Detected < 34.5 IU/mL  (<1.54 Log10 IU/mL)  Nydia Cytomegalovirus (CMV) is an FDA-cleared quantitative test that  enables the detection and quantitation of CMV DNA in EDTA plasma of  infected transplant patients on the nydia 8800 system. This test was  verified by [facility]. Results should be interpreted  with consideration of all clinical findings and laboratory findings and  should not form the sole basis for a diagnosis or treatment decision. [id number]/mL (12-02 @ 18:19)    Clostridium difficile GDH Toxins A&amp;B, EIA:   Negative (12-01-24 @ 08:25)  Clostridium difficile GDH Interpretation: Negative for toxigenic C. Difficile.  This specimen is negative for C.  Difficile glutamate dehydrogenase (GDH) antigen and negative for C.  Difficile Toxins A & B, by EIA.  GDH is a highly sensitive screening  marker for C. Difficile that is produced in large amounts by all C.  Difficile strains, both toxigenic and nontoxigenic.  This assay has not  been validated as a test of cure.  Repeat testing during the same episode  of diarrhea is of limited value and is discouraged.  The results of this  assay should always be interpreted in conjunction with patient's clinical  history. (12-01-24 @ 08:25)        Fungitell:   _______________________________________________  PERTINENT IMAGING

## 2024-12-06 NOTE — PROGRESS NOTE ADULT - ASSESSMENT
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 Pt underwent Y90 Sept, 2023 with favorable treatment response.s/p OLT 11/15/24     # S/p OLT 11/15/24 CMV D?R? EBV , toxo D?/R?  Valcyte, atovaquone ppx  now off fluconazole completed 2 weeks    # Fever, sepsis, hypoxic respiratory failure s/p on mechanical ventilation  Ct C/A/P Bilateral lower lobe consolidation with fluid and debris in the right lower lobe bronchi, concerning for aspiration pneumonia.  CMV PCR (11/24) 146 (low level CMV viremia - not likely contributing)  Adenovirus PCR (11/24) Negative  Cryptococcal Serum Ag (11/24) Negative  serum and bronch aspergillus galactomannan negative  WNV Serology and Serum PCR Negative  CT Chest (11/25) Groundglass opacities in the right lower and left upper lobes, possibly infectious in nature.  CT A/P (11/25) No acute process    # ileus  no diarrhea    Recommendations  Begun zosyn and linezolid in c/o sepsis with leucopenia now and recent exposure to meropenem  likely pulmonary source vs bacterial translocation  Mucus plug and LLL opacities noted on imaging- Therapeutic management + diagnostic Sputum cx favored  follow BC x2 on 12/5  low threshold to add tobramycin 7 mg/kg x1 if unstable pending cx results    Completed Meropenem 1 gram q 8hr (11/23 >11/29).   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 Pt underwent Y90 Sept, 2023 with favorable treatment response.s/p OLT 11/15/24     # S/p OLT 11/15/24 CMV D?R? EBV , toxo D?/R?  Valcyte, atovaquone ppx  now off fluconazole completed 2 weeks    # Fever, sepsis, hypoxic respiratory failure s/p on mechanical ventilation  Ct C/A/P Bilateral lower lobe consolidation with fluid and debris in the right lower lobe bronchi, concerning for aspiration pneumonia.  CMV PCR (11/24) 146 (low level CMV viremia - not likely contributing)  Adenovirus PCR (11/24) Negative  Cryptococcal Serum Ag (11/24) Negative  serum and bronch aspergillus galactomannan negative  WNV Serology and Serum PCR Negative  CT Chest (11/25) Groundglass opacities in the right lower and left upper lobes, possibly infectious in nature.  CT A/P (11/25) No acute process    # ileus  no diarrhea    Recommendations  Begun zosyn and linezolid in c/o sepsis with leucopenia now    pulmonary source vs bacterial translocation  Mucus plug and LLL opacities noted on imaging- Therapeutic management + diagnostic Sputum cx favored  follow BC x2 on 12/5  low threshold to add tobramycin 7 mg/kg x1 if unstable pending cx results    ---> addendum: requiring now pressors, Please give STAT tobramycin 7 mg/kg x1 , add caspofungin 70 mg then 50 mg daily , Meropenem and linezolid  repeat BC x2     Completed Meropenem 1 gram q 8hr (11/23 >11/29).   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 Pt underwent Y90 Sept, 2023 with favorable treatment response.s/p OLT 11/15/24     # S/p OLT 11/15/24 CMV D?R? EBV , toxo D?/R?  Valcyte, atovaquone ppx    # Fever, sepsis, hypoxic respiratory failure s/p on mechanical ventilation  Ct C/A/P Bilateral lower lobe consolidation with fluid and debris in the right lower lobe bronchi, concerning for aspiration pneumonia.  CMV PCR (11/24) 146 (low level CMV viremia - not likely contributing)  Adenovirus PCR (11/24) Negative  Cryptococcal Serum Ag (11/24) Negative  serum and bronch aspergillus galactomannan negative  WNV Serology and Serum PCR Negative  CT Chest (11/25) Groundglass opacities in the right lower and left upper lobes, possibly infectious in nature.  CT A/P (11/25) No acute process    # ileus  no diarrhea    Recommendations  Begun zosyn and linezolid in c/o sepsis with leucopenia now   potential sources  bacterial translocation from ileus vs ? pneumonia, though on 2-3 L  Mucus plug and LLL opacities noted on imaging-  follow BC x2 on 12/5  low threshold to add tobramycin 7 mg/kg x1 if unstable pending cx results    ---> addendum: requiring now pressors, Please give STAT tobramycin 7 mg/kg x1 , add caspofungin 70 mg then 50 mg daily , Meropenem and linezolid  repeat BC x2     Completed Meropenem 1 gram q 8hr (11/23 >11/29).   Repeat CMV PCR on 12/3/24, <34.5, continue CMV PPx    continue Valcyte 900 mg Q24H  continue Atovaquone 1,500 mg PO Q24H for PCP PPx        Thank you for involving us in the care of this patient  Transplant ID will continue to follow  Please call or page with additional questions  Pager; #[phone]  Teams: from 8 am to 5 pm  Rachele Lewis MD

## 2024-12-06 NOTE — PROGRESS NOTE ADULT - SUBJECTIVE AND OBJECTIVE BOX
24 HOUR EVENTS:  - CTH head negative  - altered mental status worsened acutely  - 500 PL  - red rubber replaced for return of ileus    NEURO  RASS (if intubated): 		CAM ICU (if concern for delirium):  Exam: AOx1-2  Meds: acetaminophen   IVPB .. 500 milliGRAM(s) IV Intermittent every 6 hours PRN Mild Pain (1 - 3)  levETIRAcetam  IVPB 250 milliGRAM(s) IV Intermittent every 12 hours  QUEtiapine 50 milliGRAM(s) Oral at bedtime  QUEtiapine 12.5 milliGRAM(s) Oral every 8 hours PRN Anxiety      RESPIRATORY  RR: 25 (12-06-24 @ 00:00) (17 - 38)  SpO2: 99% (12-06-24 @ 00:00) (94% - 100%)  Wt(kg): --  Exam: Lungs CTA b/l  Mechanical Ventilation:     Meds: albuterol/ipratropium for Nebulization 3 milliLiter(s) Nebulizer every 6 hours PRN Shortness of Breath and/or Wheezing  buDESOnide    Inhalation Suspension 0.5 milliGRAM(s) Inhalation two times a day      CARDIOVASCULAR  HR: 105 (12-06-24 @ 00:00) (85 - 118)  BP: 123/67 (12-06-24 @ 00:00) (91/55 - 146/67)  BP(mean): 90 (12-06-24 @ 00:00) (66 - 101)  ABP: --  ABP(mean): --  Wt(kg): --  CVP(cm H2O): --  VBG - ( 05 Dec 2024 20:43 )  pH: 7.32  /  pCO2: 38    /  pO2: 41    / HCO3: 20    / Base Excess: -6.0  /  SaO2: 66.8   Lactate: 1.0                Exam: Normal S1/S2 w/o murmurs or rubs  Cardiac Rhythm: sinus  Perfusion     [x ]Adequate   [ ]Inadequate  Mentation   [ ]Normal       [ x]Reduced  Extremities  [x ]Warm         [ ]Cool  Volume Status [ ]Hypervolemic [x ]Euvolemic [ ]Hypovolemic  Meds: digoxin  Injectable 125 MICROGram(s) IV Push daily  metoprolol tartrate 50 milliGRAM(s) Enteral Tube every 8 hours      GI/NUTRITION  Exam: abd mildly distended, non TTP  Diet: NPO  Last Bowel Movement: 04-Dec-2024 (12-05-24 @ 07:00)  Last Bowel Movement: 04-Dec-2024 (12-04-24 @ 19:00)  Last Bowel Movement: 03-Dec-2024 (12-04-24 @ 07:00)      Meds: methylnaltrexone Injectable 12 milliGRAM(s) SubCutaneous every other day  pantoprazole  Injectable 40 milliGRAM(s) IV Push daily  simethicone 80 milliGRAM(s) Chew every 8 hours      GENITOURINARY  I&O's Detail    12-04 @ 07:01  -  12-05 @ 07:00  --------------------------------------------------------  IN:    IV PiggyBack: 700 mL    Oral Fluid: 356 mL  Total IN: 1056 mL    OUT:    Voided (mL): 650 mL  Total OUT: 650 mL    Total NET: 406 mL      12-05 @ 07:01  -  12-06 @ 00:11  --------------------------------------------------------  IN:    dextrose 5% + sodium chloride 0.45% w/ Additives: 504 mL    dextrose 5% + sodium chloride 0.45% w/ Additives: 252 mL    dextrose 5% w/ Additives: 84 mL    Enteral Tube Flush: 110 mL    Insulin: 8 mL    IV PiggyBack: 500 mL    IV PiggyBack: 400 mL    multiple electrolytes Injection Type 1 Bolus: 500 mL    Oral Fluid: 200 mL    Sodium Bicarbonate: 672 mL  Total IN: 3230 mL    OUT:    Intermittent Catheterization - Urethral (mL): 595 mL  Total OUT: 595 mL    Total NET: 2635 mL          12-05    146[H]  |  115[H]  |  58[H]  ----------------------------<  140[H]  3.0[L]   |  18[L]  |  1.66[H]    Ca    7.2[L]      05 Dec 2024 21:10  Phos  3.4     12-05  Mg     2.3     12-05    TPro  3.8[L]  /  Alb  2.3[L]  /  TBili  0.5  /  DBili  x   /  AST  22  /  ALT  27  /  AlkPhos  100  12-05    Meds: dextrose 5% 1000 milliLiter(s) IV Continuous <Continuous>      HEMATOLOGIC  Meds: apixaban 5 milliGRAM(s) Enteral Tube every 12 hours  aspirin  chewable 81 milliGRAM(s) Oral daily                          10.8   11.13 )-----------( 175      ( 05 Dec 2024 06:25 )             36.0     PT/INR - ( 05 Dec 2024 06:25 )   PT: 16.4 sec;   INR: 1.44 ratio         PTT - ( 05 Dec 2024 06:25 )  PTT:28.8 sec    INFECTIOUS DISEASES  T(C): 37.1 (12-05-24 @ 22:00), Max: 37.5 (12-05-24 @ 15:00)  Wt(kg): --  WBC Count: 11.13 K/uL (12-05 @ 06:25)    Recent Cultures:  Specimen Source: .Stool, 12-01 @ 08:20; Results   No enteric pathogens isolated.  (Stool culture examined for Salmonella,  Shigella, Campylobacter, Aeromonas, Plesiomonas,  Vibrio, E.coli O157 and Yersinia); Gram Stain: --; Organism: --    Meds: atovaquone  Suspension 1500 milliGRAM(s) Enteral Tube daily  mycophenolate mofetil Suspension 500 milliGRAM(s) Enteral Tube <User Schedule>  valGANciclovir 50 mG/mL Oral Solution 900 milliGRAM(s) Oral daily      ENDOCRINE  Capillary Blood Glucose    Meds: insulin glargine Injectable (LANTUS) 6 Unit(s) SubCutaneous at bedtime  insulin regular Infusion 2 Unit(s)/Hr IV Continuous <Continuous>  predniSONE   Tablet 20 milliGRAM(s) Oral daily      ACCESS DEVICES:  [ ] Peripheral IV  [ ] Central Venous Line		[ ] R	[ ] L	[ ] IJ	[ ] Fem	[ ] SC	Placed:   [ ] Arterial Line			[ ] R	[ ] L	[ ] Fem	[ ] Rad	[ ] Ax	Placed:   [ ] PICC:					[ ] Mediport  [ ] Urinary Catheter, Date Placed:   [ ] Necessity of urinary, arterial, and venous catheters discussed    OTHER MEDICATIONS:  bacitracin   Ointment 1 Application(s) Topical two times a day PRN  chlorhexidine 2% Cloths 1 Application(s) Topical <User Schedule>  lidocaine   4% Patch 1 Patch Transdermal every 24 hours  nystatin Powder 1 Application(s) Topical three times a day      IMAGING:

## 2024-12-06 NOTE — PROGRESS NOTE ADULT - CRITICAL CARE ATTENDING COMMENT
S/p OLT.   N AOx3. multimodal pain management.  P NC 2L, sat >90.   C Started vasopressin 0.03, levophed 0.9. Lactate 4.9. Findings consistent with septic shock. Continue resuscitation 500cc albumin 5%, 2L crystalloid.   H Hgb 8.4(9.9), trend CBC. TEG pending.   R Cr 1.82, monitor UOP.   G NPO. PPI ppx. +BM.   I Tm 101.6, started on linezolid, caspofungin, meropenem. Tobramycin x1. A line left radial, R IJ CVC new sticks, cultures sent. CXR pending, check line. WBC 0.91. Filgrastim 480mcg x1.   E ISS. S/p OLT.   N AOx3. multimodal pain management.  P NC 2L, sat >90.   C Started vasopressin 0.03, levophed 0.9. Lactate 4.9. Findings consistent with septic shock. Continue resuscitation 500cc albumin 5%, 2L crystalloid.   H Hgb 8.4(9.9), trend CBC. TEG pending.   R Cr 1.82, monitor UOP.   G NPO. PPI ppx. +BM.   I Tm 101.6, started on linezolid, caspofungin, meropenem. Tobramycin x1. A line left radial, R IJ CVC new sticks, cultures sent. CXR pending, check line.  WBC 0.91. Filgrastim 480mcg x1 leukopenia. Stress dose steroids if refractory shock.   E ISS. S/p OLT.   N AOx3. multimodal pain management.  P NC 2L, sat >90.   C Started vasopressin 0.03, levophed 0.9. Lactate 4.9. Findings consistent with septic shock. Continue resuscitation 500cc albumin 5%, 2L crystalloid.   H Hgb 8.4(9.9), trend CBC. TEG pending.   R Cr 1.82, monitor UOP.   G NPO. PPI ppx. +BM.   I Tm 101.6, started on linezolid, caspofungin, meropenem. Tobramycin x1. A line left radial, R IJ CVC new sticks, cultures sent. CXR pending, check line.  WBC 0.91. Filgrastim 480mcg x1 leukopenia. Stress dose steroids if refractory shock.   E ISS.  Family and primary service updated at bedside.

## 2024-12-06 NOTE — PROGRESS NOTE ADULT - ASSESSMENT
74 year-old with known coronary artery disease as above presents for liver transplant.  Seen to have chest pain post transplant.  It was atypical of angina and has resolved.  Troponin remained negative.    Now with atrial fibrillation with RVR (11/19) - rate control with beta-blocker as needed, now status post Digoxin load  Converted to sinus rhythm on 12/2.    Rate control with betablocker and Digoxin - uptitrate beta-blocker as tolerated    Fever and tachycardia noted on 12/6 - unclear etiology. Septic shock with elevated lactate and rising pressor requirements.    ADDENDUM: TTE with TODD and severe LVOT gradient. Recommend volume resuscitation and phenylephrine instead of levophed. Can also use vasopressin.

## 2024-12-06 NOTE — PROGRESS NOTE ADULT - ASSESSMENT
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 (splenomegaly, recanalized paraumbilical vein, paraoesophageal and tera splenic varices), and with HCC found on 9/11/23 MRI, 1.8 cm seg 5 LR-5 HCC and a 3-4 cm seg 8 LR 4 HCC s/p Y90 Sept, 2023 with favorable treatment response, now s/p OLT on 11/15    [] POD #20 s/p OLT   - good graft function  - Diet: minced/moist diet  - Start IVF @ 84cc/hr, bolus 500cc  - Hypernatremia resolved  - Pain management: avoid nacrotics; add SImethcone 80mg q8hrs   - Bowel regimen  - Strict I&Os, Daily weights  - lasix PRN  - PO bicarb  - LE edema  - US: L brachial DVT   - daily PT     [ ] Immunosuppression  - Tacrolimus stopped 11/18 in setting of AMS/Seizure, Started Cyclo 11/24  - Cyclo by level, MMF 500mg bid, Pred 20mg daily   - SIMULECT completed  - PPx: Valcyte, Mepron, Fluc     [] lleus   - Resumed home Linzess  - imaging with distended colon (likely opioid induced)  - s/p Neostigmine x 2  - s/p Relistor, last dose 12/1  - Daily AXR     [] CMV viremia  - CMV  on  11/24  - Repeat CMV PCR 12/2: 34.5  - on Valcyte 900mg daily     [ ] AMS  - improving  - Seroquel  - Reintubated 11/20 Aspiration/hypoxia, extubated 11/24->[id number]--> extubated 11/26  - EEG 11/30 negative, Neurology following  -  following   - off tacro, now on cyclo  - on keppra    [ ] HTN/ pAFib  - increase BB prn  - cont Digoxin  - Eliquis 5mg bid  - Dr. 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 liver cirrhosis with portal htn (splenomegaly, recanalized paraumbilical vein, paraoesophageal and tera splenic varices), and with HCC found on 9/11/23 MRI, 1.8 cm seg 5 LR-5 HCC and a 3-4 cm seg 8 LR 4 HCC s/p Y90 Sept, 2023 with favorable treatment response, now s/p OLT on 11/15    [] Septic shock  - febrile, neutropenic  - bld cultures sent   -   [] POD #21 s/p OLT   - good graft function  - Diet: NPO, IVF  - Hypernatremia resolved  - Pain management: avoid narcotics; add SImethcone 80mg q8hrs   - Bowel regimen  - Strict I&Os, Daily weights  - US: L brachial DVT   - daily PT     [ ] Immunosuppression  - Tacrolimus stopped 11/18 in setting of AMS/Seizure, Started Cyclo 11/24  - Cyclo held, MMF held today, Pred 20mg daily   - PPx: Valcyte, Mepron, Fluc       [] lleus   - Resumed home Linzess  - imaging with distended colon (likely opioid induced)  - s/p Neostigmine x 2  - s/p Relistor, last dose 12/1  - Daily AXR     [] CMV viremia  - CMV  on  11/24  - Repeat CMV PCR 12/2: 34.5  - on Valcyte 900mg daily     [ ] AMS  - improving  - Seroquel  - Reintubated 11/20 Aspiration/hypoxia, extubated 11/24->[id number]--> extubated 11/26  - EEG 11/30 negative, Neurology following  -  following   - off tacro, now on cyclo  - on keppra    [ ] HTN/ pAFib  - increase BB prn  - cont Digoxin  - Eliquis 5mg bid  - Dr. 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, HTN, pAfib s/p ablation 2018 (no AC 2/2 thrombocytopenia), CAD, depression, anxiety, BPH, likely GONZALES liver cirrhosis with portal htn (splenomegaly, recanalized paraumbilical vein, paraoesophageal and tera splenic varices), and with HCC found on 9/11/23 MRI, 1.8 cm seg 5 LR-5 HCC and a 3-4 cm seg 8 LR 4 HCC s/p Y90 Sept, 2023 with favorable treatment response, now s/p OLT on 11/15    [] Septic shock  - febrile, neutropenic  - bld cultures sent   - started zosyn/linezolid, broadened to jeniffer/linezoid, tobramycin x 1, caspo   - neupogen 480mcg x1   - ID following   - f/u RVP/sputum cx  - held MMF this am   - albumin bolus 500cc, Plasmalyte 2L  - TTE today  - f/u CMV PCR  - trend lactate  - levo/vaso    [] POD #21 s/p OLT   - good graft function; uptrend in INR (likely in setting of sepsis)   - Diet: NPO, IVF  - Hypernatremia resolved  - Pain management: avoid narcotics;   - Bowel regimen  - Strict I&Os, Daily weights  - US: L brachial DVT   - daily PT     [ ] Immunosuppression  - Tacrolimus stopped 11/18 in setting of AMS/Seizure, Started Cyclo 11/24  - Cyclo held, MMF held today, Pred 20mg daily   - PPx: Valcyte, Mepron, Fluc     [] lleus   - Resumed home Linzess  - imaging with distended colon (likely opioid induced)  - s/p Neostigmine x 2  - s/p Relistor, last dose 12/1  - Daily AXR     [] CMV viremia  - CMV  on  11/24  - Repeat CMV PCR 12/2: 34.5  - Valcyte 900mg daily; held 12/6 for neutropenia     [ ] AMS  - improving  - Seroquel  - Reintubated 11/20 Aspiration/hypoxia, extubated 11/24->[id number]--> extubated 11/26  - EEG 11/30 negative, Neurology following  - BH following   - off tacro, now on cyclo  - on keppra    [ ] HTN/ pAFib  - cont Digoxin  - metoprolol held (hypotensive)   - Eliquis 5mg bid - held 12/6 (for possible OR intervention)   - Dr. Quintanilla following  - watch Digoxin levels, check with AM labs    [ ] DM  -Lantus/Lispro, adjust prn

## 2024-12-06 NOTE — PROGRESS NOTE ADULT - ASSESSMENT
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 Post-op course c/b worsening mental status and re-intubation 11/20 for acute hypoxemic respiratory failure 2/2 aspiration, extubated 11/24 and re-intubated 11/24. Pt then extubated 11/26, now on nasal cannula.  Colonic ileus s/p several rounds of Relistor and Neostigmine, rectal tube, now with improvement.  ?Steroid Psychosis, now improving, minimal narcotics/Ativan requirements.    PLAN:  Neuro:  - worsened MS today  - pain control w/ Tylenol 500mg q6, oycodone 2.5/5 prn, lido patch   - takes xanax 2mg TID at home, s/p Benzo taper, holding benzos for AMS today  - CT head 11/19 and 11/21 negative  - CT head 11/25 - negative   - CT head 11/29 - negative   - CT head 12/5 - negative  - EEG: Mild diffuse cerebral dysfunction that is not specific in etiology. No epileptic discharges recorded. No seizures recorded.  - Keppra: 250 BID IV  - Seroquel 50mg HS,12.5 BID prn   - Holding home xanax, Abilify, Zoloft, Remeron, Lexapro     Resp:  - Room air  - CT chest 11/25: Ground glass opacities in the right lower and left upper lobes, possibly infectious in nature.  - budesonide    CV:  - goal MAP > 65 mmHg  - lactate clear  - Metoprolol 50 TID  - Dig 125 qd  - Dig level (11/26) - 0.9    GI:   - FEES 12/3: Minced and moist diet, moderately thick liquids, crushed pills one at a time  - Ileus ongoing  - red rubber replaced  - daily rellistor  - Protonix for stress ulcer prophylaxis  - post-op liver doppler w/ patent vasculature   - Liver doppler 11/29: Portal venous gas visualized with c/f mesenteric ischemia.   - CT abd/pel 11/29: Dilated small and large bowel consistent with an ileus. No evidence of portal venous gas as questioned on liver Doppler.  - prednisone, cellcept, cyclosporine  - Linzess 145mcg QD    Renal:  - Monitor I&Os and electrolytes w/ repletions as necessary  - anion gap closed s/p insulin ggt for DKA  - D5W w/ 20 meq of K @ 84/hr  - cardura  - sodium bicarb BID    Heme:  - Monitor CBC and coags  - Eliquis 5 BID  - ASA PO  - LUE duplex 11/27 superficial thrombophlebitis, neg DVT  - LUE duplex 12/3 with L brachial DVT    ID:   - Transplant ppx, therapeutic valcyte, mepron  - immunosuppression w/ steroids, cyclosporine  - Blood cx 11/20 NGTD, 11/22 NGTD, 11/24 NGTD  - BAL 11/24 - candida glabrata (BAL fungitell >500)   - Serum fungitell neg     Endo:   - Monitor glucose   - lantus 6 for transition from insulin ggt  - moderate ISS  - prednisone  - post-transplant steroid taper

## 2024-12-06 NOTE — CONSULT NOTE ADULT - SUBJECTIVE AND OBJECTIVE BOX
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 Post-op course c/b worsening mental status and acute hypoxic respiratory failure requiring multiple intubations/extubations, currently extubated (as of 24) and colonic ileus s/p several rounds of Relistor and Neostigmine with NGT and rectal tube currently in place now with septic shock of unclear etiology. Transplant nephrology consulted for metabolic acidosis and MORAIMA.     Pt. seen and examined in SICU earlier today with multiple family members present at bedside. Pt. appears altered/somnolent. Pt. is hemodynamically unstable on multiple vasopressors. Per family patient has had multiple episodes of ileus and now with the septic picture they are concerned for ischemic bowel. Unable to obtain ROS due to current clinical status.     PAST HISTORY  --------------------------------------------------------------------------------  PAST MEDICAL & SURGICAL HISTORY:  Diabetes  Transaminitis  Paroxysmal atrial fibrillation  Depression  BPH (benign prostatic hyperplasia)  Hypertension  Chronic atrial fibrillation  Coronary artery disease  Hepatocellular carcinoma  DM (diabetes mellitus)  HTN (hypertension)  Paroxysmal atrial fibrillation  Cirrhosis  HCC (hepatocellular carcinoma)  History of BPH  History of laparoscopic cholecystectomy  History of lumbar laminectomy  H/O prior ablation treatment  H/O percutaneous left heart catheterization      FAMILY HISTORY:  Family history of coronary artery disease (Father, Sibling, Sibling)    Family history of diabetes mellitus (Father, Mother)    Family history of coronary artery disease (Sibling)      Social History:      ALLERGIES & MEDICATIONS  --------------------------------------------------------------------------------  Allergies    No Known Allergies    Intolerances      Standing Inpatient Medications  aspirin  chewable 81 milliGRAM(s) Oral daily  atovaquone  Suspension 1500 milliGRAM(s) Enteral Tube daily  buDESOnide    Inhalation Suspension 0.5 milliGRAM(s) Inhalation two times a day  caspofungin IVPB      chlorhexidine 2% Cloths 1 Application(s) Topical <User Schedule>  digoxin  Injectable 125 MICROGram(s) IV Push daily  insulin glargine Injectable (LANTUS) 6 Unit(s) SubCutaneous at bedtime  insulin lispro (ADMELOG) corrective regimen sliding scale   SubCutaneous every 4 hours  levETIRAcetam  IVPB 250 milliGRAM(s) IV Intermittent every 12 hours  lidocaine   4% Patch 1 Patch Transdermal every 24 hours  linezolid  IVPB 600 milliGRAM(s) IV Intermittent every 12 hours  meropenem  IVPB 1000 milliGRAM(s) IV Intermittent every 12 hours  methylnaltrexone Injectable 12 milliGRAM(s) SubCutaneous every other day  norepinephrine Infusion 1.1 MICROgram(s)/kG/Min IV Continuous <Continuous>  nystatin Powder 1 Application(s) Topical three times a day  pantoprazole  Injectable 40 milliGRAM(s) IV Push daily  QUEtiapine 50 milliGRAM(s) Oral at bedtime  simethicone 80 milliGRAM(s) Chew every 8 hours  sodium bicarbonate  Infusion 0.2 mEq/kG/Hr IV Continuous <Continuous>  vasopressin Infusion 0.03 Unit(s)/Min IV Continuous <Continuous>    PRN Inpatient Medications  acetaminophen   IVPB .. 500 milliGRAM(s) IV Intermittent every 6 hours PRN  albuterol/ipratropium for Nebulization 3 milliLiter(s) Nebulizer every 6 hours PRN  bacitracin   Ointment 1 Application(s) Topical two times a day PRN  QUEtiapine 12.5 milliGRAM(s) Oral every 8 hours PRN      REVIEW OF SYSTEMS  --------------------------------------------------------------------------------  Unable to obtain ROS due to current clinical status.     VITALS/PHYSICAL EXAM  --------------------------------------------------------------------------------  T(C): 38.5 (24 @ 16:00), Max: 38.7 (24 @ 06:00)  HR: 133 (24 @ 16:45) (85 - 144)  BP: 186/131 (24 @ 13:30) (60/35 - 186/131)  RR: 41 (24 @ 16:45) (17 - 46)  SpO2: 96% (24 @ 16:45) (91% - 100%)  Wt(kg): --        24 @ 07:01  -  24 @ 07:00  --------------------------------------------------------  IN: 4151.3 mL / OUT: 995 mL / NET: 3156.3 mL    24 @ 07:01  -  24 @ 17:05  --------------------------------------------------------  IN: 6121.3 mL / OUT: 145 mL / NET: 5976.3 mL      Physical Exam:  Gen: critically ill, altered  Pulm: rales bilaterally  CV: tachycardic, S1S2+  Abd: + distended. +incisions. +NGT in place.   : +nichols catheter with dark colored urine  MSK: 2+ pitting edema to knee  Skin: Warm  Access: R TLC, femoral A-line.     LABS/STUDIES  --------------------------------------------------------------------------------              9.0    0.89  >-----------<  232      [24 15:31]              30.9     143  |  113  |  57  ----------------------------<  99      [24 15:31]  3.8   |  10  |  1.90        Ca     7.4     [24 15:31]      Mg     1.9     [24 15:31]      Phos  3.9     [24 15:31]    TPro  3.4  /  Alb  1.9  /  TBili  0.5  /  DBili  x   /  AST  31  /  ALT  27  /  AlkPhos  72  [24 15:31]    PT/INR: PT 29.8 , INR 2.64       [24 15:31]  PTT: 39.3       [24 15:31]      Creatinine Trend:  SCr 1.90 [12-06 @ 15:31]  SCr 1.82 [ 12:45]  SCr 1.56 [ 05:17]  SCr 1.56 [ 01:30]  SCr 1.66 [ 21:10]    Urinalysis - [24 15:31]      Color  / Appearance  / SG  / pH       Gluc 99 / Ketone   / Bili  / Urobili        Blood  / Protein  / Leuk Est  / Nitrite       RBC  / WBC  / Hyaline  / Gran  / Sq Epi  / Non Sq Epi  / Bacteria       Vitamin D (25OH) <6.0      [24 @ 08:32]  TSH 1.59      [24 08:32]  Lipid: chol 114, , HDL 47, LDL --      [24 @ 06:48]    HBsAb 41.9      [11-15-24 @ 00:41]  HBsAg Nonreact      [11-15-24 @ 00:41]  HBcAb Nonreact      [11-15-24 @ 00:41]  HCV 0.06, Nonreact      [11-15-24 @ 00:41]  HIV Nonreact      [11-15-24 @ 00:41]      Tacrolimus  Cyclosporine  Sirolimus  Mycophenolate  BK PCR  CMV PCRCMVPCR Log: Det <1.54 Assay Dynamic Range: 34.5 to 1.0E+07 IU/mL (1.54 to 7.00 Log10 IU/mL)  Assay lower limit of quantification (LLOQ) is 34.5 IU/mL (1.54 Log10  IU/mL)  CMV DNA detected below the LLOQ will be reported as Detected < 34.5 IU/mL  (<1.54 Log10 IU/mL)  Nydia Cytomegalovirus (CMV) is an FDA-cleared quantitative test that  enables the detection and quantitation of CMV DNA in EDTA plasma of  infected transplant patients on the nydia 8800 system. This test was  verified by [employer]. Results should be interpreted  with consideration of all clinical findings and laboratory findings and  should not form the sole basis for a diagnosis or treatment decision. Tht27HF/mL ( @ 06:11)  CMVPCR Log: Det <1.54 Assay Dynamic Range: 34.5 to 1.0E+07 IU/mL (1.54 to 7.00 Log10 IU/mL)  Assay lower limit of quantification (LLOQ) is 34.5 IU/mL (1.54 Log10  IU/mL)  CMV DNA detected below the LLOQ will be reported as Detected < 34.5 IU/mL  (<1.54 Log10 IU/mL)  Nydia Cytomegalovirus (CMV) is an FDA-cleared quantitative test that  enables the detection and quantitation of CMV DNA in EDTA plasma of  infected transplant patients on the nydia 8800 system. This test was  verified by [employer]. Results should be interpreted  with consideration of all clinical findings and laboratory findings and  should not form the sole basis for a diagnosis or treatment decision. Woh95PQ/mL ( @ 18:19)  CMVPCR Lo.16 Kmr38AV/mL ( @ 11:24)    Parvo PCR  EBV PCR

## 2024-12-07 ENCOUNTER — TRANSCRIPTION ENCOUNTER (OUTPATIENT)
Age: 74
End: 2024-12-07

## 2024-12-07 ENCOUNTER — RESULT REVIEW (OUTPATIENT)
Age: 74
End: 2024-12-07

## 2024-12-07 NOTE — PROGRESS NOTE ADULT - SUBJECTIVE AND OBJECTIVE BOX
SICU Daily Progress Note  =====================================================  Interval/Overnight Events:     - metop 5 x1 for tachycardia  - fever, cultures already sent 12/5 - f/u  - febrile to 101.7, held cellcept, valcyte  - WBCs <1, zarxio x1 given  - CVC  - 500cc albumin, 2L plasmalyte  - levo and vaso started  - stress dose hydrocortisone 100 x1, 50 q6hr  - increasing lactate  - bicarb gtt    PM:  - TTE showing LVOT and TODD, cards aware  - 250 cc 5% albumin x2  - D50 added to bicarb drip  - 1u FFP  - 1 amp D50 for hypoglycemia to 62  - 2x amp bicarb  - RTOR for ex-lap    Allergies: No Known Allergies      MEDICATIONS:   --------------------------------------------------------------------------------------  Neurologic Medications  dexMEDEtomidine Infusion 0.5 MICROgram(s)/kG/Hr IV Continuous <Continuous>  levETIRAcetam  IVPB 250 milliGRAM(s) IV Intermittent every 12 hours  QUEtiapine 50 milliGRAM(s) Oral at bedtime  QUEtiapine 12.5 milliGRAM(s) Oral every 8 hours PRN Anxiety    Respiratory Medications  albuterol/ipratropium for Nebulization 3 milliLiter(s) Nebulizer every 6 hours PRN Shortness of Breath and/or Wheezing  buDESOnide    Inhalation Suspension 0.5 milliGRAM(s) Inhalation two times a day  sodium chloride 3%  Inhalation 4 milliLiter(s) Inhalation every 12 hours    Cardiovascular Medications  digoxin  Injectable 125 MICROGram(s) IV Push daily  norepinephrine Infusion 1.6 MICROgram(s)/kG/Min IV Continuous <Continuous>  phenylephrine    Infusion 5.2 MICROgram(s)/kG/Min IV Continuous <Continuous>    Gastrointestinal Medications  pantoprazole  Injectable 40 milliGRAM(s) IV Push daily  simethicone 80 milliGRAM(s) Chew every 8 hours  sodium bicarbonate  Infusion 0.2 mEq/kG/Hr IV Continuous <Continuous>  sodium bicarbonate  Injectable 50 milliEquivalent(s) IV Push once  sodium bicarbonate  Injectable 50 milliEquivalent(s) IV Push once    Genitourinary Medications    Hematologic/Oncologic Medications  aspirin  chewable 81 milliGRAM(s) Oral daily    Antimicrobial/Immunologic Medications  atovaquone  Suspension 1500 milliGRAM(s) Enteral Tube daily  caspofungin IVPB      caspofungin IVPB 50 milliGRAM(s) IV Intermittent every 24 hours  linezolid  IVPB 600 milliGRAM(s) IV Intermittent every 12 hours  meropenem  IVPB 1000 milliGRAM(s) IV Intermittent every 12 hours    Endocrine/Metabolic Medications  hydrocortisone sodium succinate Injectable 50 milliGRAM(s) IV Push every 6 hours  insulin glargine Injectable (LANTUS) 6 Unit(s) SubCutaneous at bedtime  insulin lispro (ADMELOG) corrective regimen sliding scale   SubCutaneous every 4 hours  vasopressin Infusion 0.1 Unit(s)/Min IV Continuous <Continuous>    Topical/Other Medications  bacitracin   Ointment 1 Application(s) Topical two times a day PRN abrasions  chlorhexidine 0.12% Liquid 15 milliLiter(s) Oral Mucosa every 12 hours  chlorhexidine 2% Cloths 1 Application(s) Topical <User Schedule>  lidocaine   4% Patch 1 Patch Transdermal every 24 hours  nystatin Powder 1 Application(s) Topical three times a day    --------------------------------------------------------------------------------------    VITAL SIGNS, INS/OUTS (last 24 hours):  --------------------------------------------------------------------------------------  T(C): 38.3 (12-06-24 @ 23:00), Max: 38.7 (12-06-24 @ 06:00)  HR: 101 (12-07-24 @ 01:15) (100 - 144)  BP: 92/42 (12-06-24 @ 21:00) (60/35 - 186/131)  RR: 42 (12-07-24 @ 01:15) (20 - 47)  SpO2: 100% (12-07-24 @ 01:15) (91% - 100%)    12-05-24 @ 07:01  -  12-06-24 @ 07:00  --------------------------------------------------------  IN: 4151.3 mL / OUT: 995 mL / NET: 3156.3 mL    12-06-24 @ 07:01  -  12-07-24 @ 03:02  --------------------------------------------------------  IN: 9498.2 mL / OUT: 870 mL / NET: 8628.2 mL      --------------------------------------------------------------------------------------    EXAM  NEUROLOGY  Exam: Normal, NAD, alert, oriented x3, no focal deficits.    HEENT  Exam: Normocephalic, atraumatic, EOMI.     RESPIRATORY  Exam: Increased WOB on NC    CARDIOVASCULAR  Exam: Tachycardia. No murmur. On pressors.       GI/NUTRITION  Exam: Abdomen soft, distended, mildly tender to palpation.     VASCULAR  Exam: Extremities warm, pink, well-perfused.     MUSCULOSKELETAL  Exam: All extremities moving spontaneously without limitations.     SKIN  Exam: Good skin turgor, no skin breakdown.       LABS  --------------------------------------------------------------------------------------                        9.8    1.39  )-----------( 187      ( 06 Dec 2024 23:38 )             30.1   12-06    147[H]  |  112[H]  |  54[H]  ----------------------------<  94  3.5   |  12[L]  |  1.78[H]    Ca    7.3[L]      06 Dec 2024 23:38  Phos  4.3     12-06  Mg     2.4     12-06    TPro  3.6[L]  /  Alb  2.1[L]  /  TBili  1.1  /  DBili  x   /  AST  70[H]  /  ALT  44  /  AlkPhos  69  12-06  ABG - ( 07 Dec 2024 02:44 )  pH, Arterial: 7.13  pH, Blood: x     /  pCO2: 29    /  pO2: 224   / HCO3: 10    / Base Excess: -18.1 /  SaO2: 100.0           Urinalysis Basic - ( 06 Dec 2024 23:38 )    Color: x / Appearance: x / SG: x / pH: x  Gluc: 94 mg/dL / Ketone: x  / Bili: x / Urobili: x   Blood: x / Protein: x / Nitrite: x   Leuk Esterase: x / RBC: x / WBC x   Sq Epi: x / Non Sq Epi: x / Bacteria: x    PT/INR - ( 06 Dec 2024 23:38 )   PT: 31.5 sec;   INR: 2.79 ratio         PTT - ( 06 Dec 2024 23:38 )  PTT:41.4 sec  --------------------------------------------------------------------------------------

## 2024-12-07 NOTE — AIRWAY PLACEMENT NOTE ADULT - AIRWAY COMMENTS:
Patient arrived intubated from the OR
Patient received intubated from OR
Pt intubated by anesthesia, Dr. Kearney
Patient reintubated
PT was intubated by anesthesia.
Pt. hypoxic despite high-flow nasal oxygen; witnessed aspiration event by ICU staff. Requested airway protection. Vitals remained stable post intubation.

## 2024-12-07 NOTE — AIRWAY PLACEMENT NOTE ADULT - POST AIRWAY PLACEMENT ASSESSMENT:
breath sounds bilateral/breath sounds equal/positive end tidal CO2 noted/CXR pending/chest excursion noted/skin color improved
breath sounds bilateral/breath sounds equal/CXR pending
breath sounds bilateral/positive end tidal CO2 noted/CXR pending
breath sounds bilateral/breath sounds equal/positive end tidal CO2 noted/CXR pending

## 2024-12-07 NOTE — AIRWAY PLACEMENT NOTE ADULT - INDICATIONS:
Route for mechanical ventilation
Route for mechanical ventilation
Route for mechanical ventilation/Respiratory distress
Witnessed aspiration/Respiratory distress

## 2024-12-07 NOTE — PROGRESS NOTE ADULT - ASSESSMENT
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 Post-op course c/b worsening mental status and re-intubation 11/20 for acute hypoxemic respiratory failure 2/2 aspiration, extubated 11/24 and re-intubated 11/24. Pt then extubated 11/26, now on nasal cannula.  Colonic ileus s/p several rounds of Relistor and Neostigmine, rectal tube, now with improvement. Rapid decompensation on 12/6 with fevers and increasing pressors, now intubated for AHRF and worsening acidemia with plan for RTOR for ex-lap.     PLAN:  Neuro:  - Off sedation   - pain control w/ Tylenol 500mg q6, oycodone 2.5/5 prn, lido patch   - takes xanax 2mg TID at home, s/p Benzo taper  - CT head 11/19 and 11/21 negative  - CT head 11/25 - negative   - CT head 11/29 - negative   - CT head 12/6 - negative  - EEG: Mild diffuse cerebral dysfunction that is not specific in etiology. No epileptic discharges recorded. No seizures recorded.  - Keppra: 250 BID   - Seroquel 50mg HS,12.5 BID prn   - avoid benzos to reduce delirium  - melatonin 5mg qhs  - Holding home xanax, Abilify, Zoloft, Remeron, Lexapro     Resp:  - 14/450/5/80  - CT chest 11/25: Groundglass opacities in the right lower and left upper lobes, possibly infectious in nature.  - budesonide, duonebs    CV:  - levo and vaso  - goal MAP > 65 mmHg  - lactate increasing, labs q4  - Metoprolol 50 TID  - Dig 125 qd  - Dig level (11/26) - 0.9  - TTE 12/4 EF 70%, normal RV  - TTE 12/7 showing LVOT and TODD    GI:   - Diet: NPO with NGT  - FEES 12/3: Minced and moist diet, moderately thick liquids, crushed pills one at a time  - Ileus  - Protonix for stress ulcer prophylaxis  - post-op liver doppler w/ patent vasculature   - Liver doppler 11/29: Portal venous gas visualized with c/f mesenteric ischemia.   - CT abd/pel 11/29: Dilated small and large bowel consistent with an ileus. No evidence of portal venous gas as questioned on liver Doppler.  - prednisone   - cyclo, cellcept held d/t fevers  - CT A/P showing dilated loops of bowel without obstruction  - Relistor  - f/u OR course 12/7    Renal:  - Monitor I&Os and electrolytes w/ repletions as necessary  - bicarb gtt with dextrose  - nichols    Heme:  - Monitor CBC and coags  - Eliquis 5 BID - held now  - ASA PO  - LUE duplex 11/27 superficial thrombophlebitis, neg DVT  - LUE duplex 12/3 with L brachial DVT  - 1x dose filgrastim for leukopenia 12/6     ID:   - Transplant ppx, therapeutic mepron  - immunosuppression w/ steroids  - 12/6 off cyclo (12/5), cellcept, valcyte d/t fever  - Blood cx 11/20 NGTD, 11/22 NGTD, 11/24 NGTD  - BAL 11/24 - candida glabrata (BAL fungitell >500)   - Serum fungitell neg   - f/u 12/5 blood cx  - 12/6: caspo, jeniffer, linezolid started; 1x dose tobramycin  - f/u fungitell, aspergillus sent 12/7    Endo:   - Monitor glucose   - lantus 6u, moderate ISS  - prednisone  - post-transplant steroid taper   - 1x amp dextrose 12/7 for hypoglycemia    - Dispo: SICU

## 2024-12-07 NOTE — PROGRESS NOTE ADULT - SUBJECTIVE AND OBJECTIVE BOX
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     s/p OLT 11/15 with post op course c/b:  s/p Coloctomy 12/7. ORL 2uPRBC/2uFFP  IABP 12/7  Hypoxia: Reintubated 11/20, extubated 11/24->reintubated 11/24, extubated 11/26   Ileus   Fever  A fib  AMS/Seizure   L brachial DVT  Neutropenia    Interval/Overnight Events:     - intubated, on pressors  - on caspo, jeniffer, linezolid  - Trend lactate and LFT's  - 1uplasma, vitK 5mg x1  - BCX: GNR      Immunosuppression:  -Cyclo (held), MMF held this am, on stress dose steroids  -ongoing monitoring for signs of rejection      Potential Discharge date: Pending clinical course    Education: Medications    Plan of care: See Below      MEDICATIONS  (STANDING):  caspofungin IVPB 50 milliGRAM(s) IV Intermittent every 24 hours  chlorhexidine 0.12% Liquid 15 milliLiter(s) Oral Mucosa every 12 hours  CRRT Treatment    <Continuous>  dexMEDEtomidine Infusion 0.03 MICROgram(s)/kG/Hr (0.7 mL/Hr) IV Continuous <Continuous>  DOBUTamine Infusion 5 MICROgram(s)/kG/Min (14 mL/Hr) IV Continuous <Continuous>  EPINEPHrine    Infusion 0.1 MICROgram(s)/kG/Min (17.6 mL/Hr) IV Continuous <Continuous>  ganciclovir IVPB 120 milliGRAM(s) IV Intermittent every 24 hours  hydrocortisone sodium succinate Injectable 100 milliGRAM(s) IV Push every 8 hours  linezolid  IVPB 600 milliGRAM(s) IV Intermittent every 12 hours  meperidine     Injectable 25 milliGRAM(s) IV Push once  meropenem  IVPB 1000 milliGRAM(s) IV Intermittent every 12 hours  pantoprazole  Injectable 40 milliGRAM(s) IV Push daily  PrismaSATE Dialysate BGK 4 / 2.5 5000 milliLiter(s) (1300 mL/Hr) CRRT <Continuous>  PrismaSOL Filtration BGK 4 / 2.5 5000 milliLiter(s) (1100 mL/Hr) CRRT <Continuous>  PrismaSOL Filtration BGK 4 / 2.5 5000 milliLiter(s) (200 mL/Hr) CRRT <Continuous>  sodium bicarbonate  Injectable 50 milliEquivalent(s) IV Push every 20 minutes  sodium chloride 0.9%. 1000 milliLiter(s) (10 mL/Hr) IV Continuous <Continuous>    MEDICATIONS  (PRN):  oxycodone    5 mG/acetaminophen 325 mG 1 Tablet(s) Oral every 4 hours PRN Mild Pain (1 - 3)      PAST MEDICAL & SURGICAL HISTORY:  Diabetes      Transaminitis      Paroxysmal atrial fibrillation      Depression      BPH (benign prostatic hyperplasia)      Hypertension      Chronic atrial fibrillation      Coronary artery disease      Hepatocellular carcinoma      DM (diabetes mellitus)      HTN (hypertension)      Paroxysmal atrial fibrillation      Cirrhosis      HCC (hepatocellular carcinoma)      History of BPH      History of laparoscopic cholecystectomy      History of lumbar laminectomy      H/O prior ablation treatment      H/O percutaneous left heart catheterization          Vital Signs Last 24 Hrs  T(C): 36.2 (07 Dec 2024 12:00), Max: 38.5 (06 Dec 2024 16:00)  T(F): 97.2 (07 Dec 2024 12:00), Max: 101.3 (06 Dec 2024 16:00)  HR: 110 (07 Dec 2024 13:00) (97 - 144)  BP: 92/42 (07 Dec 2024 03:58) (92/42 - 135/80)  BP(mean): 60 (07 Dec 2024 03:58) (60 - 97)  RR: 18 (07 Dec 2024 13:00) (10 - 47)  SpO2: 100% (07 Dec 2024 13:00) (87% - 100%)    Parameters below as of 07 Dec 2024 12:00  Patient On (Oxygen Delivery Method): ventilator    O2 Concentration (%): 50    I&O's Summary    06 Dec 2024 07:01  -  07 Dec 2024 07:00  --------------------------------------------------------  IN: 78255.3 mL / OUT: 1050 mL / NET: 14651.3 mL    07 Dec 2024 07:01  -  07 Dec 2024 13:47  --------------------------------------------------------  IN: 425.6 mL / OUT: 2180 mL / NET: -1754.4 mL                              10.6   2.00  )-----------( 56       ( 07 Dec 2024 10:35 )             34.7     12-07    149[H]  |  107  |  50[H]  ----------------------------<  132[H]  4.1   |  11[L]  |  1.85[H]    Ca    9.3      07 Dec 2024 10:35  Phos  7.4     12-07  Mg     2.4     12-07    TPro  4.1[L]  /  Alb  2.4[L]  /  TBili  2.7[H]  /  DBili  x   /  AST  5017[H]  /  ALT  2000[H]  /  AlkPhos  68  12-07          Culture - Blood (collected 12-06-24 @ 13:30)  Source: .Blood BLOOD  Gram Stain (12-07-24 @ 03:44):    Growth in aerobic bottle: Gram Negative Rods    Growth in anaerobic bottle: Gram Negative Rods  Preliminary Report (12-07-24 @ 03:44):    Growth in aerobic bottle: Gram Negative Rods    Growth in anaerobic bottle: Gram Negative Rods    Culture - Blood (collected 12-06-24 @ 13:25)  Source: .Blood BLOOD  Gram Stain (12-07-24 @ 03:07):    Growth in aerobic and anaerobic bottles: Gram Negative Rods  Preliminary Report (12-07-24 @ 03:07):    Growth in aerobic and anaerobic bottles: Gram Negative Rods    Direct identification is available within approximately 3-5    hours either by Blood Panel Multiplexed PCR or Direct    MALDI-TOF. Details: [email or web address]  Organism: Blood Culture PCR (12-07-24 @ 05:33)  Organism: Blood Culture PCR (12-07-24 @ 05:33)    Culture - Blood (collected 12-05-24 @ 14:15)  Source: .Blood BLOOD  Preliminary Report (12-06-24 @ 18:01):    No growth at 24 hours    Culture - Blood (collected 12-05-24 @ 14:00)  Source: .Blood BLOOD  Preliminary Report (12-06-24 @ 18:01):    No growth at 24 hours    Culture - Stool (collected 12-01-24 @ 08:20)  Source: .Stool  Final Report (12-03-24 @ 17:39):    No enteric pathogens isolated.    (Stool culture examined for Salmonella,    Shigella, Campylobacter, Aeromonas, Plesiomonas,    Vibrio, E.coli O157 and Yersinia)      ROS: Unable to assess patient is intubated, sedated    PHYSICAL EXAM:   Constitutional: intubated, sedated   Eyes:  PERRLA  ENMT: nc/at, no thrush   Neck: supple   Respiratory: CTA B/L  Cardiovascular: RRR  Gastrointestinal: incision open, clean dry with Abthera  Genitourinary: nichols  Extremities: SCD's in place and working bilaterally, + LE edema  Neurological: intubated, sedated  Skin: no rashes, ulcerations, lesions

## 2024-12-07 NOTE — PROGRESS NOTE ADULT - ASSESSMENT
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 RTOR on 12/7.    [] Septic shock/Cardiogenic shock  [] s/p Colectomy 12/7 POD#0  [] IAPB 12/7: CTICU on board  - bld cx GNR  - started zosyn/linezolid, broadened to jeniffer/linezoid, tobramycin x 1, caspo   - neupogen 480mcg x1   - ID following   - f/u RVP/sputum cx  - f/u CMV PCR  - trend lactate    [] POD #22 s/p OLT   - fu rpt liver US  - trend LFT's  - Diet: NPO, IVF  - Pain management: avoid narcotics  - Bowel regimen  - Strict I&Os, Daily weights  - US: L brachial DVT   - daily PT     [ ] Immunosuppression  - Tacrolimus stopped 11/18 in setting of AMS/Seizure, Started Cyclo 11/24  - Cyclo held, MMF held today, on stress dose steroids  - PPx: Valcyte HELD due to neutropenia, bactrim held due to MORAIMA    [] lleus   -@ home Linzess  - imaging with distended colon (likely opioid induced)  - s/p Neostigmine x 2  - s/p Relistor, last dose 12/1  - Daily AXR     [] CMV viremia  - CMV  on  11/24  - Repeat CMV PCR 12/2: 34.5  - Valcyte 900mg daily; held 12/6 for neutropenia     [ ] AMS  - Reintubated 11/20 Aspiration/hypoxia, extubated 11/24->[id number]--> extubated 11/26  - EEG 11/30 negative, Neurology following  - BH following   - off tacro  - on keppra    [ ] HTN/ pAFib  - cont Digoxin  - metoprolol held (hypotensive)   - Eliquis 5mg bid - held 12/6 (for possible OR intervention)   - Dr. Quintanilla following  - watch Digoxin levels, check with AM labs    [ ] DM  -Lantus/Lispro, adjust prn

## 2024-12-07 NOTE — PROGRESS NOTE ADULT - SUBJECTIVE AND OBJECTIVE BOX
Transplant Surgery - Multidisciplinary Progress Note  --------------------------------------------------------------  OLT   11/15/2024        POD#22    Present: Patient seen and examined with multidisciplinary Transplant team including (Surgery:  Hepatologist:  RNs in AM rounds. Disciplines not in attendance will be notified of the plan.     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     s/p OLT 11/15 with post op course c/b:  Hypoxia: Reintubated 11/20, extubated 11/24->reintubated 11/24, extubated 11/26   Ileus   Fever  A fib  AMS/Seizure   L brachial DVT  Neutropenia    Interval/Overnight Events:     - metop 5 x1 for tachycardia  - fever, cultures already sent 12/5 - f/u  - febrile to 101.7, held cellcept, valcyte  - WBCs <1, zarxio x1 given  - CVC  - 500cc albumin, 2L plasmalyte  - levo and vaso started  - stress dose hydrocortisone 100 x1, 50 q6hr  - increasing lactate  - bicarb gtt    PM:  - LVOTO on TTE  - weaned off levo and increased kassandra  - 1uPRBC + 250ccalbumin to increase preload -- initially with improved tachycardia, UOP, lactate  - 2AM: rising lactate, decreased UOP  - 21x FFP, 500cc albumin   - d50x1 glucose 60   - intubated for increasing tachypnea   - RTOR for abdominal source       Immunosuppression:  -Cyclo (held), MMF held this am, pred 20  -ongoing monitoring for signs of rejection      Potential Discharge date: Pending clinical course    Education: Medications    Plan of care: See Below    MEDICATIONS  (STANDING):  aspirin  chewable 81 milliGRAM(s) Oral daily  atovaquone  Suspension 1500 milliGRAM(s) Enteral Tube daily  buDESOnide    Inhalation Suspension 0.5 milliGRAM(s) Inhalation two times a day  calcium gluconate IVPB 2 Gram(s) IV Intermittent once  caspofungin IVPB      caspofungin IVPB 50 milliGRAM(s) IV Intermittent every 24 hours  chlorhexidine 0.12% Liquid 15 milliLiter(s) Oral Mucosa every 12 hours  chlorhexidine 2% Cloths 1 Application(s) Topical <User Schedule>  dexMEDEtomidine Infusion 0.5 MICROgram(s)/kG/Hr (11.7 mL/Hr) IV Continuous <Continuous>  digoxin  Injectable 125 MICROGram(s) IV Push daily  hydrocortisone sodium succinate Injectable 50 milliGRAM(s) IV Push every 6 hours  insulin glargine Injectable (LANTUS) 6 Unit(s) SubCutaneous at bedtime  insulin lispro (ADMELOG) corrective regimen sliding scale   SubCutaneous every 4 hours  levETIRAcetam  IVPB 250 milliGRAM(s) IV Intermittent every 12 hours  lidocaine   4% Patch 1 Patch Transdermal every 24 hours  linezolid  IVPB 600 milliGRAM(s) IV Intermittent every 12 hours  meropenem  IVPB 1000 milliGRAM(s) IV Intermittent every 12 hours  norepinephrine Infusion 1.6 MICROgram(s)/kG/Min (140 mL/Hr) IV Continuous <Continuous>  nystatin Powder 1 Application(s) Topical three times a day  pantoprazole  Injectable 40 milliGRAM(s) IV Push daily  phenylephrine    Infusion 5.2 MICROgram(s)/kG/Min (91.3 mL/Hr) IV Continuous <Continuous>  QUEtiapine 50 milliGRAM(s) Oral at bedtime  simethicone 80 milliGRAM(s) Chew every 8 hours  sodium bicarbonate  Infusion 0.2 mEq/kG/Hr (125 mL/Hr) IV Continuous <Continuous>  sodium chloride 3%  Inhalation 4 milliLiter(s) Inhalation every 12 hours  vasopressin Infusion 0.1 Unit(s)/Min (15 mL/Hr) IV Continuous <Continuous>    MEDICATIONS  (PRN):  albuterol/ipratropium for Nebulization 3 milliLiter(s) Nebulizer every 6 hours PRN Shortness of Breath and/or Wheezing  bacitracin   Ointment 1 Application(s) Topical two times a day PRN abrasions  QUEtiapine 12.5 milliGRAM(s) Oral every 8 hours PRN Anxiety      PAST MEDICAL & SURGICAL HISTORY:  Diabetes      Transaminitis      Paroxysmal atrial fibrillation      Depression      BPH (benign prostatic hyperplasia)      Hypertension      Chronic atrial fibrillation      Coronary artery disease      Hepatocellular carcinoma      DM (diabetes mellitus)      HTN (hypertension)      Paroxysmal atrial fibrillation      Cirrhosis      HCC (hepatocellular carcinoma)      History of BPH      History of laparoscopic cholecystectomy      History of lumbar laminectomy      H/O prior ablation treatment      H/O percutaneous left heart catheterization          Vital Signs Last 24 Hrs  T(C): 38.3 (06 Dec 2024 23:00), Max: 38.7 (06 Dec 2024 06:00)  T(F): 100.9 (06 Dec 2024 23:00), Max: 101.7 (06 Dec 2024 06:00)  HR: 109 (07 Dec 2024 02:54) (100 - 144)  BP: 92/42 (06 Dec 2024 21:00) (60/35 - 186/131)  BP(mean): 60 (06 Dec 2024 21:00) (42 - 148)  RR: 42 (07 Dec 2024 01:15) (20 - 47)  SpO2: 100% (07 Dec 2024 02:54) (91% - 100%)    Parameters below as of 06 Dec 2024 23:05  Patient On (Oxygen Delivery Method): nasal cannula        I&O's Summary    05 Dec 2024 07:01  -  06 Dec 2024 07:00  --------------------------------------------------------  IN: 4151.3 mL / OUT: 995 mL / NET: 3156.3 mL    06 Dec 2024 07:01  -  07 Dec 2024 03:21  --------------------------------------------------------  IN: 9498.2 mL / OUT: 870 mL / NET: 8628.2 mL                              9.8    1.39  )-----------( 187      ( 06 Dec 2024 23:38 )             30.1     12-06    147[H]  |  112[H]  |  54[H]  ----------------------------<  94  3.5   |  12[L]  |  1.78[H]    Ca    7.3[L]      06 Dec 2024 23:38  Phos  4.3     12-06  Mg     2.4     12-06    TPro  3.6[L]  /  Alb  2.1[L]  /  TBili  1.1  /  DBili  x   /  AST  70[H]  /  ALT  44  /  AlkPhos  69  12-06          PHYSICAL EXAM:   Constitutional: intubated, sedated   Eyes:  PERRLA  ENMT: nc/at, no thrush   Neck: supple   Respiratory: CTA B/L  Cardiovascular: RRR  Gastrointestinal:   Genitourinary: Voiding,   Extremities: SCD's in place and working bilaterally, + LE edema  Neurological:   Skin: no rashes, ulcerations, lesions     Transplant Surgery - Multidisciplinary Progress Note  --------------------------------------------------------------  OLT   11/15/2024        POD#22    Present: Patient seen and examined with multidisciplinary Transplant team including (Surgery:  Hepatologist:  RNs in AM rounds. Disciplines not in attendance will be notified of the plan.     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     s/p OLT 11/15 with post op course c/b:  Hypoxia: Reintubated 11/20, extubated 11/24->reintubated 11/24, extubated 11/26   Ileus   Fever  A fib  AMS/Seizure   L brachial DVT  Neutropenia    Interval/Overnight Events:     - metop 5 x1 for tachycardia  - fever, cultures already sent 12/5 - f/u  - febrile to 101.7, held cellcept, valcyte  - WBCs <1, zarxio x1 given  - CVC  - 500cc albumin, 2L plasmalyte  - levo and vaso started  - stress dose hydrocortisone 100 x1, 50 q6hr  - increasing lactate  - bicarb gtt    PM:  - LVOTO on TTE  - weaned off levo and increased kassandra  - 1uPRBC + 250ccalbumin to increase preload -- initially with improved tachycardia, UOP, lactate  - 2AM: rising lactate, decreased UOP  - 1x FFP, 500cc albumin   - d50x1 glucose 60   - intubated for increasing tachypnea  - bicarb 2x for pH 7.12   - RTOR for abdominal source       Immunosuppression:  -Cyclo (held), MMF held this am, pred 20  -ongoing monitoring for signs of rejection      Potential Discharge date: Pending clinical course    Education: Medications    Plan of care: See Below    MEDICATIONS  (STANDING):  aspirin  chewable 81 milliGRAM(s) Oral daily  atovaquone  Suspension 1500 milliGRAM(s) Enteral Tube daily  buDESOnide    Inhalation Suspension 0.5 milliGRAM(s) Inhalation two times a day  calcium gluconate IVPB 2 Gram(s) IV Intermittent once  caspofungin IVPB      caspofungin IVPB 50 milliGRAM(s) IV Intermittent every 24 hours  chlorhexidine 0.12% Liquid 15 milliLiter(s) Oral Mucosa every 12 hours  chlorhexidine 2% Cloths 1 Application(s) Topical <User Schedule>  dexMEDEtomidine Infusion 0.5 MICROgram(s)/kG/Hr (11.7 mL/Hr) IV Continuous <Continuous>  digoxin  Injectable 125 MICROGram(s) IV Push daily  hydrocortisone sodium succinate Injectable 50 milliGRAM(s) IV Push every 6 hours  insulin glargine Injectable (LANTUS) 6 Unit(s) SubCutaneous at bedtime  insulin lispro (ADMELOG) corrective regimen sliding scale   SubCutaneous every 4 hours  levETIRAcetam  IVPB 250 milliGRAM(s) IV Intermittent every 12 hours  lidocaine   4% Patch 1 Patch Transdermal every 24 hours  linezolid  IVPB 600 milliGRAM(s) IV Intermittent every 12 hours  meropenem  IVPB 1000 milliGRAM(s) IV Intermittent every 12 hours  norepinephrine Infusion 1.6 MICROgram(s)/kG/Min (140 mL/Hr) IV Continuous <Continuous>  nystatin Powder 1 Application(s) Topical three times a day  pantoprazole  Injectable 40 milliGRAM(s) IV Push daily  phenylephrine    Infusion 5.2 MICROgram(s)/kG/Min (91.3 mL/Hr) IV Continuous <Continuous>  QUEtiapine 50 milliGRAM(s) Oral at bedtime  simethicone 80 milliGRAM(s) Chew every 8 hours  sodium bicarbonate  Infusion 0.2 mEq/kG/Hr (125 mL/Hr) IV Continuous <Continuous>  sodium chloride 3%  Inhalation 4 milliLiter(s) Inhalation every 12 hours  vasopressin Infusion 0.1 Unit(s)/Min (15 mL/Hr) IV Continuous <Continuous>    MEDICATIONS  (PRN):  albuterol/ipratropium for Nebulization 3 milliLiter(s) Nebulizer every 6 hours PRN Shortness of Breath and/or Wheezing  bacitracin   Ointment 1 Application(s) Topical two times a day PRN abrasions  QUEtiapine 12.5 milliGRAM(s) Oral every 8 hours PRN Anxiety      PAST MEDICAL & SURGICAL HISTORY:  Diabetes      Transaminitis      Paroxysmal atrial fibrillation      Depression      BPH (benign prostatic hyperplasia)      Hypertension      Chronic atrial fibrillation      Coronary artery disease      Hepatocellular carcinoma      DM (diabetes mellitus)      HTN (hypertension)      Paroxysmal atrial fibrillation      Cirrhosis      HCC (hepatocellular carcinoma)      History of BPH      History of laparoscopic cholecystectomy      History of lumbar laminectomy      H/O prior ablation treatment      H/O percutaneous left heart catheterization          Vital Signs Last 24 Hrs  T(C): 38.3 (06 Dec 2024 23:00), Max: 38.7 (06 Dec 2024 06:00)  T(F): 100.9 (06 Dec 2024 23:00), Max: 101.7 (06 Dec 2024 06:00)  HR: 109 (07 Dec 2024 02:54) (100 - 144)  BP: 92/42 (06 Dec 2024 21:00) (60/35 - 186/131)  BP(mean): 60 (06 Dec 2024 21:00) (42 - 148)  RR: 42 (07 Dec 2024 01:15) (20 - 47)  SpO2: 100% (07 Dec 2024 02:54) (91% - 100%)    Parameters below as of 06 Dec 2024 23:05  Patient On (Oxygen Delivery Method): nasal cannula        I&O's Summary    05 Dec 2024 07:01  -  06 Dec 2024 07:00  --------------------------------------------------------  IN: 4151.3 mL / OUT: 995 mL / NET: 3156.3 mL    06 Dec 2024 07:01  -  07 Dec 2024 03:21  --------------------------------------------------------  IN: 9498.2 mL / OUT: 870 mL / NET: 8628.2 mL                              9.8    1.39  )-----------( 187      ( 06 Dec 2024 23:38 )             30.1     12-06    147[H]  |  112[H]  |  54[H]  ----------------------------<  94  3.5   |  12[L]  |  1.78[H]    Ca    7.3[L]      06 Dec 2024 23:38  Phos  4.3     12-06  Mg     2.4     12-06    TPro  3.6[L]  /  Alb  2.1[L]  /  TBili  1.1  /  DBili  x   /  AST  70[H]  /  ALT  44  /  AlkPhos  69  12-06          PHYSICAL EXAM:   Constitutional: intubated, sedated   Eyes:  PERRLA  ENMT: nc/at, no thrush   Neck: supple   Respiratory: CTA B/L  Cardiovascular: RRR  Gastrointestinal:   Genitourinary: Voiding,   Extremities: SCD's in place and working bilaterally, + LE edema  Neurological:   Skin: no rashes, ulcerations, lesions

## 2024-12-07 NOTE — PROGRESS NOTE ADULT - SUBJECTIVE AND OBJECTIVE BOX
Patient seen and examined at the bedside.    Remains critically ill on continuous ICU monitoring.    Brief Summary:  75 yo M with Cardiogenic shock s/p IABP insertion, total abdominal colectomy with ileostomy on 12/7/2024    24 Hour events:      Objective:  Vital Signs Last 24 Hrs  T(C): 36.4 (07 Dec 2024 11:00), Max: 38.5 (06 Dec 2024 16:00)  T(F): 97.5 (07 Dec 2024 11:00), Max: 101.3 (06 Dec 2024 16:00)  HR: 118 (07 Dec 2024 11:00) (97 - 144)  BP: 92/42 (07 Dec 2024 03:58) (60/35 - 186/131)  BP(mean): 60 (07 Dec 2024 03:58) (42 - 148)  RR: 18 (07 Dec 2024 11:00) (10 - 47)  SpO2: 100% (07 Dec 2024 11:00) (87% - 100%)    Parameters below as of 07 Dec 2024 11:00      O2 Concentration (%): 50    Mode: AC/ CMV (Assist Control/ Continuous Mandatory Ventilation)  RR (machine): 14  TV (machine): 500  FiO2: 40  PEEP: 5  ITime: 0.9  MAP: 9.2  PIP: 27      Physical Exam:  General: Intubated  Neurology: Sedated  Respiratory: Bilateral breath sounds  CV: Sinus tachycarida  Abdominal: Soft, Nontender  Extremities: Warm, well-perfused  -------------------------------------------------------------------------------------------------------------------------------    Labs:                        10.6   2.00  )-----------( x        ( 07 Dec 2024 10:35 )             34.7     12-07    149[H]  |  107  |  50[H]  ----------------------------<  132[H]  4.1   |  11[L]  |  1.85[H]    Ca    9.3      07 Dec 2024 10:35  Phos  7.4     12-07  Mg     2.4     12-07    TPro  4.1[L]  /  Alb  2.4[L]  /  TBili  2.7[H]  /  DBili  x   /  AST  x   /  ALT  x   /  AlkPhos  68  12-07    LIVER FUNCTIONS - ( 07 Dec 2024 10:35 )  Alb: 2.4 g/dL / Pro: 4.1 g/dL / ALK PHOS: 68 U/L / ALT: x     / AST: x     / GGT: x           PT/INR - ( 07 Dec 2024 10:35 )   PT: 31.5 sec;   INR: 2.77 ratio         PTT - ( 07 Dec 2024 10:35 )  PTT:38.0 sec  ABG - ( 07 Dec 2024 10:57 )  pH, Arterial: 7.21  pH, Blood: x     /  pCO2: 35    /  pO2: 204   / HCO3: 14    / Base Excess: -12.9 /  SaO2: 99.2              ------------------------------------------------------------------------------------------------------------------------------  Assessment:  75 yo M with Cardiogenic shock s/p IABP insertion, total abdominal colectomy with ileostomy on 12/7/2024    Hemodynamic instability  Hypovolemia  Lactic acidosis  Post op respiratory insufficiency  Acute blood loss anemia  Thrombocytopenia    Plan:  ***Neuro***  Sedated with Precedex  Postoperative acute pain control with Meperidine and prns.    ***Cardiovascular***  At high risk for hemodynamic instability and cardiac arrhythmias.  R Fem IABP 1:2  Trend Lactate  Wean pressors for MAP > 65 mm Hg.  Remains on Dobutamine    ***Pulmonary***  Postoperative acute respiratory failure  Wean ventilator as tolerated.   Deep breathing and coughing exercises.  Wean oxygen as able.    ***GI***  NPO for now.  Protonix for stress ulcer prophylaxis.   Bowel regimen.    ***Renal***  Trend Creatinine.  Castro catheter for strict I/O measurements.  Replete electrolytes as indicated.    ***ID***  Sepsis - Caspofungin  CMV prophylaxis - Ganciclovir  Febrile - Linezolid  Bacteremia - Meropenem  Trend leukocytosis.    ***Endocrine***  DM - Insulin infusion.    ***Hematology***  Acute blood loss anemia and thrombocytopenia.  4 PRBC, 4 FFP transfused in OR    Care plan discussed with the ICU care team.   Patient remains critical, at risk for life threatening decompensation.    I have spent 30 minutes providing critical care management to this patient.    By signing my name below, I, Jenn Viera , attest that this documentation has been prepared under the direction and in the presence of Eloisa Wesley MD.  Electronically signed: Jenn Viera, 12-07-24 @ 11:09    I, Eloisa Wesley,  personally performed the services described in this documentation. All medical record entries made by the scribe were at my direction and in my presence. I have reviewed the chart and agree that the record reflects my personal performance and is accurate and complete  Electronically signed: Eloisa Wesley MD. Patient seen and examined at the bedside.    Remains critically ill on continuous ICU monitoring.    Brief Summary:  73 yo M s/p recent liver transplant now with Cardiogenic shock s/p IABP insertion, total abdominal colectomy on 12/7/2024  Abdomen remains open and bowel is in discontinuity.    24 Hour events:  Arrived to the ICU on Epinephrine, Dobutamine and pressor support as well as nitric oxide.  Acidotic - ongoing bicarb pushes  IVF/Albumin resuscitation ongoing.  IABP @1:2 due to tachycardia  Line placed for CRRT.      Objective:  Vital Signs Last 24 Hrs  T(C): 36.4 (07 Dec 2024 11:00), Max: 38.5 (06 Dec 2024 16:00)  T(F): 97.5 (07 Dec 2024 11:00), Max: 101.3 (06 Dec 2024 16:00)  HR: 118 (07 Dec 2024 11:00) (97 - 144)  BP: 92/42 (07 Dec 2024 03:58) (60/35 - 186/131)  BP(mean): 60 (07 Dec 2024 03:58) (42 - 148)  RR: 18 (07 Dec 2024 11:00) (10 - 47)  SpO2: 100% (07 Dec 2024 11:00) (87% - 100%)    Parameters below as of 07 Dec 2024 11:00      O2 Concentration (%): 50    Mode: AC/ CMV (Assist Control/ Continuous Mandatory Ventilation)  RR (machine): 14  TV (machine): 500  FiO2: 40  PEEP: 5  ITime: 0.9  MAP: 9.2  PIP: 27      Physical Exam:  General: Intubated  Neurology: Sedated  Respiratory: Bilateral breath sounds  CV: Sinus tachycardia  Abdominal: Soft, Nontender  Extremities: Warm, well-perfused  Castro  IABP site ok.    -------------------------------------------------------------------------------------------------------------------------------    Labs:                        10.6   2.00  )-----------( x        ( 07 Dec 2024 10:35 )             34.7     12-07    149[H]  |  107  |  50[H]  ----------------------------<  132[H]  4.1   |  11[L]  |  1.85[H]    Ca    9.3      07 Dec 2024 10:35  Phos  7.4     12-07  Mg     2.4     12-07    TPro  4.1[L]  /  Alb  2.4[L]  /  TBili  2.7[H]  /  DBili  x   /  AST  x   /  ALT  x   /  AlkPhos  68  12-07    LIVER FUNCTIONS - ( 07 Dec 2024 10:35 )  Alb: 2.4 g/dL / Pro: 4.1 g/dL / ALK PHOS: 68 U/L / ALT: x     / AST: x     / GGT: x           PT/INR - ( 07 Dec 2024 10:35 )   PT: 31.5 sec;   INR: 2.77 ratio         PTT - ( 07 Dec 2024 10:35 )  PTT:38.0 sec  ABG - ( 07 Dec 2024 10:57 )  pH, Arterial: 7.21  pH, Blood: x     /  pCO2: 35    /  pO2: 204   / HCO3: 14    / Base Excess: -12.9 /  SaO2: 99.2          ------------------------------------------------------------------------------------------------------------------------------  Assessment:  73 yo M with Cardiogenic shock s/p IABP insertion, total abdominal colectomy with ileostomy on 12/7/2024    Hemodynamic instability/Cardiogenic shock  Hypovolemia  Lactic acidosis  Acute respiratory failure (pre-op)  Acute blood loss anemia  Thrombocytopenia  MORAIMA  Coagulopathy      Plan:  ***Neuro***  Sedated with Precedex  Postoperative acute pain control with prns.  Neuro exam when anesthesia has cleared.    ***Cardiovascular***  At high risk for hemodynamic instability and cardiac arrhythmias.  R Fem IABP 1:2 - will change to 1:1 when less tachycardic  Trend Lactate  Wean pressors for MAP > 65 mm Hg.  Remains on Dobutamine - will wean Epinephrine to off as able.  Continue nitric oxide for now.    ***Pulmonary***  Postoperative acute respiratory failure  Continue vent support.    ***GI***  NPO for now.  NG to suction, bowel in discontinuity.  Monitor VAC output.  Protonix for stress ulcer prophylaxis.     ***Renal***  Trend Creatinine.  CRRT to start.  Castro catheter for strict I/O measurements.    ***ID***  Merrem, Linezolid, Caspofungin for sepsis.  Follow up all cultures.  CMV prophylaxis - Ganciclovir    ***Endocrine***  Hyperglycemia - Insulin infusion.    ***Hematology***  Acute blood loss anemia and thrombocytopenia and coagulopathy.  4 PRBC, 4 FFP transfused in OR  Will transfuse as needed, monitor for bleeding.      Care plan discussed with the ICU care team.   Patient remains critical, at risk for life threatening decompensation.  This note was finalized on 12/8/24 but reflects care provided on 12/7/24.    I have spent 50 minutes providing critical care management to this patient.    By signing my name below, I, Jenn Viera , attest that this documentation has been prepared under the direction and in the presence of Eloisa Wesley MD.  Electronically signed: Jenn Viera, 12-07-24 @ 11:09    I, Eloisa Wesley,  personally performed the services described in this documentation. All medical record entries made by the scribe were at my direction and in my presence. I have reviewed the chart and agree that the record reflects my personal performance and is accurate and complete  Electronically signed: Eloisa Wesley MD.

## 2024-12-07 NOTE — AIRWAY PLACEMENT NOTE ADULT - MEDICATIONS GIVEN TO FACILATATE INTUBATION
etomidate 10mg, succinylcholine 120mg, phenylephrine 200mcg x2
Midazolam 4 mg, Etomidate 4 mg, Rocuronium 100 mg

## 2024-12-07 NOTE — PROGRESS NOTE ADULT - SUBJECTIVE AND OBJECTIVE BOX
Follow Up:  septic shock    Interval History:   Septic shock over the course of 12/5-12-6  with increasing pressor requirement, acute cytopenias,   overnight worsening lactic acidosis and decision made to ex-lap, noted to have ischemic /dusky colon s/p colectomy    Intraoperatively: Left, transverse and right colon were dusky and distended severely. Rectum was seen to be healthy. Mesenteric vessels with good doppler signals, hepatic artery with good doppler signals, small bowel as well as liver graft showed some diffuse congestion  Due to patient condition, the decision was made not to mature the ileostomy but instead to leave the bowel ends in discontinuity for a planned second look laparotomy    Also, noted to have LVOT so pressors chenged yesterday to phenilephrine,   now intubated and Impella placed.     Ecoli on BC 11/6 , no resistance genes    Vital Signs Last 24 Hrs  T(C): 36.5 (07 Dec 2024 10:00), Max: 38.5 (06 Dec 2024 16:00)  T(F): 97.7 (07 Dec 2024 10:00), Max: 101.3 (06 Dec 2024 16:00)  HR: 120 (07 Dec 2024 10:30) (97 - 144)  BP: 92/42 (07 Dec 2024 03:58) (60/35 - 186/131)  BP(mean): 60 (07 Dec 2024 03:58) (42 - 148)  RR: 10 (07 Dec 2024 10:30) (10 - 47)  SpO2: 100% (07 Dec 2024 10:30) (87% - 100%)    Parameters below as of 07 Dec 2024 03:58    O2 Flow (L/min): 4  O2 Concentration (%): 30      PHYSICAL EXAMINATION:  General:intubated  Cardiac: RRR, No M/R/G  Resp: CTAB, No Wh/Rh/Ra  Abdomen:   MSK: No LE edema. No Calf tenderness  Skin: No rashes or lesions. Skin is warm and dry to the touch.   Neuro: Alert and Awake. CN 2-12 Grossly intact. Moves all four extremities spontaneously.  Psych: alert                     ____________________________________________________  ROS  GENERAL: denies chills, , night sweats, weight loss.   PSYCH: denies depression, anxiety, suicidal ideation, hallucination, and delusions  SKIN: no rash or lesions; no color changes, no abnormal nevi,no  dryness, and nojaundice    EYES: denies visual changes, floaters, pain, inflammation, blurred vision, and discharge  ENT: denies tinnitus, vertigo, epistaxis, oral lesion, and decreased acuity  PULM: denies, hemoptysis, pleurisy  CVS: denies angina, palpitations,+ orthopnea, no syncope, or heart murmur  GI: denies constipation, diarrhea, melena, abdominal pain, nausea.   : denies dysuria, frequency, discharge, incontinence, stones or macroscopic hematuria  MS: no arthralgias, no erythema or swelling, no myalgias, noedema, or lower back pain.   CNS: denies numbness, dizziness, seizure, or tremor  ENDO: denies heat/cold intolerance, polyuria, polydipsia, malaise.    HEME: denies bruising, bleeding, lymphadenopathy, anemia, and calf pain    Allergies  No Known Allergies    __________________________________________________  MEDS:  MEDICATIONS  (STANDING):  dexMEDEtomidine Infusion 0.03 <Continuous>  DOBUTamine Infusion 5 <Continuous>  EPINEPHrine    Infusion 0.1 <Continuous>  meperidine     Injectable 25 once  oxycodone    5 mG/acetaminophen 325 mG 1 every 4 hours PRN  pantoprazole  Injectable 40 daily    _________________________________________________  ANTIMICOBIALS  atovaquone  Suspension 1500 daily  caspofungin IVPB 50 every 24 hours  linezolid  IVPB 600 every 12 hours  meropenem  IVPB 1000 every 8 hours      GENERAL LABS              8.1                  146  | 11   | 52           2.11  >-----------< 136     ------------------------< 133                   26.4                 4.2  | 108  | 1.92                                         Ca 7.4   Mg 2.4   Ph 7.4        Urinalysis Basic - ( 07 Dec 2024 03:45 )    Color: x / Appearance: x / SG: x / pH: x  Gluc: 133 mg/dL / Ketone: x  / Bili: x / Urobili: x   Blood: x / Protein: x / Nitrite: x   Leuk Esterase: x / RBC: x / WBC x   Sq Epi: x / Non Sq Epi: x / Bacteria: x        _________________________________________________  MICROBIOLOGY  -----------    Culture - Blood (collected 06 Dec 2024 13:30)  Source: .Blood BLOOD  Gram Stain (07 Dec 2024 03:44):    Growth in aerobic bottle: Gram Negative Rods    Growth in anaerobic bottle: Gram Negative Rods  Preliminary Report (07 Dec 2024 03:44):    Growth in aerobic bottle: Gram Negative Rods    Growth in anaerobic bottle: Gram Negative Rods    Culture - Blood (collected 06 Dec 2024 13:25)  Source: .Blood BLOOD  Gram Stain (07 Dec 2024 03:07):    Growth in aerobic and anaerobic bottles: Gram Negative Rods  Preliminary Report (07 Dec 2024 03:07):    Growth in aerobic and anaerobic bottles: Gram Negative Rods    Direct identification is available within approximately 3-5    hours either by Blood Panel Multiplexed PCR or Direct    MALDI-TOF. Details: [email or web address]  Organism: Blood Culture PCR (07 Dec 2024 05:33)  Organism: Blood Culture PCR (07 Dec 2024 05:33)      Method Type: PCR      -  Escherichia coli: Detec    Culture - Blood (collected 05 Dec 2024 14:15)  Source: .Blood BLOOD  Preliminary Report (06 Dec 2024 18:01):    No growth at 24 hours    Culture - Blood (collected 05 Dec 2024 14:00)  Source: .Blood BLOOD  Preliminary Report (06 Dec 2024 18:01):    No growth at 24 hours            CMVPCR Log: Det <1.54 Assay Dynamic Range: 34.5 to 1.0E+07 IU/mL (1.54 to 7.00 Log10 IU/mL)  Assay lower limit of quantification (LLOQ) is 34.5 IU/mL (1.54 Log10  IU/mL)  CMV DNA detected below the LLOQ will be reported as Detected < 34.5 IU/mL  (<1.54 Log10 IU/mL)  Nydia Cytomegalovirus (CMV) is an FDA-cleared quantitative test that  enables the detection and quantitation of CMV DNA in EDTA plasma of  infected transplant patients on the nydia 8800 system. This test was  verified by [employer]. Results should be interpreted  with consideration of all clinical findings and laboratory findings and  should not form the sole basis for a diagnosis or treatment decision. [id number]/mL (12-03 @ 06:11)  CMVPCR Log: Det <1.54 Assay Dynamic Range: 34.5 to 1.0E+07 IU/mL (1.54 to 7.00 Log10 IU/mL)  Assay lower limit of quantification (LLOQ) is 34.5 IU/mL (1.54 Log10  IU/mL)  CMV DNA detected below the LLOQ will be reported as Detected < 34.5 IU/mL  (<1.54 Log10 IU/mL)  Nydia Cytomegalovirus (CMV) is an FDA-cleared quantitative test that  enables the detection and quantitation of CMV DNA in EDTA plasma of  infected transplant patients on the nydia 8800 system. This test was  verified by [employer]. Results should be interpreted  with consideration of all clinical findings and laboratory findings and  should not form the sole basis for a diagnosis or treatment decision. [id number]/mL (12-02 @ 18:19)    Clostridium difficile GDH Toxins A&amp;B, EIA:   Negative (12-01-24 @ 08:25)  Clostridium difficile GDH Interpretation: Negative for toxigenic C. Difficile.  This specimen is negative for C.  Difficile glutamate dehydrogenase (GDH) antigen and negative for C.  Difficile Toxins A & B, by EIA.  GDH is a highly sensitive screening  marker for C. Difficile that is produced in large amounts by all C.  Difficile strains, both toxigenic and nontoxigenic.  This assay has not  been validated as a test of cure.  Repeat testing during the same episode  of diarrhea is of limited value and is discouraged.  The results of this  assay should always be interpreted in conjunction with patient's clinical  history. (12-01-24 @ 08:25)        Fungitell:   _______________________________________________  PERTINENT IMAGING

## 2024-12-07 NOTE — CHART NOTE - NSCHARTNOTEFT_GEN_A_CORE
Patient seen and examined during night rounds and multiple times before and since, discussions with Dr. Quintanilla and Dr. Dagher held.    6PM - Discussed TTE results with LVOTO and TODD noted with Dr. Quintanilla and Dr. Dagher - plan to transition off levo and onto adlo to try to decrease HR and continue volume resuscitation to increase preload, including 1U PRBC. Update given to patient's family also.    9PM - High pressor doses, with aldo 5, levo 1.6, vaso 0.03; tachycardia improved (from 130s to 110s, sinus tach), urine output over 200cc/hr, LA 4.2 from 4.7. BP didn't respond to transfusion so additional boluses not given at that time. Vaso increased to 0.1 to try to decrease levo further. Dr. Dagher and Dr. Quintanilla aware.    10PM - LA improved to 3.8.    12AM - LA increased to 4.6, urine output decreased to approx 50/hr. Aldo 5.6, vaso 0.1, levo off, HR 104bpm. Additional albumin ordered for further volume resuscitation. Dr. Dagher and Dr. Quintanilla aware. Patient complaining of abdominal pain, consistent with prior complaints. Says his breathing is heavy. Thick secretions being managed with nebs and nasal trumpet for suctioning. RR improved from previous. Abd soft but distended, not peritoneal, mildly ttp lower abdomen. Discussed plan with patient's family at bedside.     2AM - LA increased to 7.8. CVP 13. Pt's respiratory status unchanged, still tachypneic but no need for emergent intubation at this time. Abdominal exam unchanged. Aldo 4.6, vaso 0.1, urine output still approx 50/hr. INR 2.7. I spoke with Dr. Dagher, who agrees that given worsening lactate, septic shock of unclear etiology, pt should have an ex lap, will call in the operative team. In the meantime, I have ordered 500cc 5% albumin and 2 units FFP for further volume resuscitation to support patient for induction of anesthesia. Discussed with patient's family at bedside. Patient seen and examined during night rounds and multiple times before and since, discussions with Dr. Quintanilla and Dr. Dagher held.    6PM - Discussed TTE results with LVOTO and TODD noted with Dr. Quintanilla and Dr. Dagher - plan to transition off levo and onto aldo to try to decrease HR and continue volume resuscitation to increase preload, including 1U PRBC. Update given to patient's family also.    9PM - High pressor doses, with aldo 5, levo 1.6, vaso 0.03; tachycardia improved (from 130s to 110s, sinus tach), urine output over 200cc/hr, LA 4.2 from 4.7. BP didn't respond to transfusion so additional boluses not given at that time. Vaso increased to 0.1 to try to decrease levo further. Dr. Dagher and Dr. Quintanilla aware.    10PM - LA improved to 3.8.    12AM - LA increased to 4.6, urine output decreased to approx 50/hr. Aldo 5.6, vaso 0.1, levo off, HR 104bpm. Additional albumin ordered for further volume resuscitation. Dr. Dagher and Dr. Quintanilla aware. Patient complaining of abdominal pain, consistent with prior complaints. Says his breathing is heavy. Thick secretions being managed with nebs and nasal trumpet for suctioning. RR improved from previous. Abd soft but distended, not peritoneal, mildly ttp lower abdomen. Discussed plan with patient's family at bedside.     2AM - LA increased to 7.8. CVP 13. Pt's respiratory status unchanged, still tachypneic but no need for emergent intubation at this time. Abdominal exam unchanged. Aldo 4.6, vaso 0.1, urine output still approx 50/hr. INR 2.7. I spoke with Dr. Dagher, who agrees that given worsening lactate, septic shock of unclear etiology, pt should have an ex lap, will call in the operative team. In the meantime, I have ordered 500cc 5% albumin and 2 units FFP for further volume resuscitation to support patient for induction of anesthesia. Discussed with patient's family at bedside.    CC time 45 minutes Patient seen and examined during night rounds and multiple times before and since, discussions with Dr. Quintanilla and Dr. Dagher held.    6PM - Discussed TTE results with LVOTO and TODD noted with Dr. Quintanilla and Dr. Dagher - plan to transition off levo and onto aldo to try to decrease HR and continue volume resuscitation to increase preload, including 1U PRBC. Update given to patient's family also.    9PM - High pressor doses, with aldo 5, levo 1.6, vaso 0.03; tachycardia improved (from 130s to 110s, sinus tach), urine output over 200cc/hr, LA 4.2 from 4.7. BP didn't respond to transfusion so additional boluses not given at that time. Vaso increased to 0.1 to try to decrease levo further. Dr. Dagher and Dr. Quintanilla aware.    10PM - LA improved to 3.8.    12AM - LA increased to 4.6, urine output decreased to approx 50/hr. Aldo 5.6, vaso 0.1, levo off, HR 104bpm. Additional albumin ordered for further volume resuscitation. Dr. Dagher and Dr. Quintanilla aware. Patient complaining of abdominal pain, consistent with prior complaints. Says his breathing is heavy. Thick secretions being managed with nebs and nasal trumpet for suctioning. RR improved from previous. Abd soft but distended, not peritoneal, mildly ttp lower abdomen. Discussed plan with patient's family at bedside.     2AM - LA increased to 7.8. CVP 13. Pt's respiratory status unchanged, still tachypneic but no need for emergent intubation at this time. Abdominal exam unchanged. Aldo 4.6, vaso 0.1, urine output still approx 50/hr. INR 2.7. I spoke with Dr. Dagher, who agrees that given worsening lactate, septic shock of unclear etiology, pt should have an ex lap, will call in the operative team. In the meantime, I have ordered 500cc 5% albumin and 2 units FFP for further volume resuscitation to support patient for induction of anesthesia. Discussed with patient's family at bedside.    CC time 45 minutes    Addendum:    3AM: Called to bedside for increased WOB and hypoxemia. Decision made to intubate, discussed with patient and family at bedside. Anesthesiology called - single pass intubation after etomidate and succ. Precedex ordered for sedation, will supplement with dilaudid if needed. No propofol due to hemodynamic status. Had already received albumin 5% 250cc and one unit FFP, aldo decreased to 3.6, vaso 0.1. Pre-intubation ABG (returned after intubation as that decision was clinical) shows pH 7.12, ordered two amps of bicarb. LA approx 8.     Dr. Dagher aware of intubation, updated me that OR team has been activated.

## 2024-12-07 NOTE — BRIEF OPERATIVE NOTE - OPERATION/FINDINGS
Left, transverse and right colon were dusky and distended severely. Rectum was seen to be healthy. Mesenteric vessels with good doppler signals, hepatic artery with good doppler signals, small bowel as well as liver graft showed some diffuse congestion Left, transverse and right colon were dusky and distended severely. Rectum was seen to be healthy. Mesenteric vessels with good doppler signals, hepatic artery with good doppler signals, small bowel as well as liver graft showed some diffuse congestion  Due to patient condition, the decision was made not to mature the ileostomy but instead to leave the bowel ends in discontinuity for a planned second look laparotomy  Left, transverse and right colon were dusky and distended severely. Rectum was seen to be healthy. Mesenteric vessels to small bowel with good doppler signals, hepatic artery with good doppler signals, small bowel as well as liver graft showed some diffuse congestion  Due to patient condition, the decision was made not to mature the ileostomy but instead to leave the bowel ends in discontinuity for a planned second look laparotomy  Left, transverse and right colon were dusky and distended severely. Rectum was seen to be healthy. Mesenteric vessels to small bowel with good doppler signals, hepatic artery with good doppler signals, small bowel as well as liver graft showed some diffuse congestion  Due to patient condition, the decision was made not to mature the ileostomy but instead to leave the bowel ends in discontinuity for a planned second look laparotomy   3 lap pads left inside with 1 each in left and right paracolic gutters and 1 in pelvis

## 2024-12-07 NOTE — AIRWAY PLACEMENT NOTE ADULT - DATE /TIME:
07-Dec-2024 02:54
20-Nov-2024 14:20
07-Dec-2024 10:10
15-Nov-2024 17:59
07-Dec-2024 02:54
20-Nov-2024 14:19
24-Nov-2024 16:45

## 2024-12-07 NOTE — PROGRESS NOTE ADULT - ASSESSMENT
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 Pt underwent Y90 Sept, 2023 with favorable treatment response.s/p OLT 11/15/24     # S/p OLT 11/15/24 CMV D?R? EBV , toxo D?/R?  Valcyte, atovaquone ppx  Repeat CMV PCR on 12/3/24, <34.5, continue CMV PPx  continue Valcyte 900 mg Q24H  continue Atovaquone 1,500 mg PO Q24H for PCP PPx        # Ileus/ischemic bowel  ileus for >1 week- Cdiff neg 12/1  S/p rectal decompression  # severe septic shock 12/6 and pancytopenia, lactic acidosis  # Ecoli bacteremia in context of above  received meropenem, linezolid/caspofungin + tobramycin x1 on 11/6  BC 12/6 positive  s/p colectomy on 12/6- noted dusky appearance- plan to go back to OR  # cardiogenic shock  on dobutamine- s/p impella  # LLL mucus plug    ---> recommendations  Continue meropenem for Ecoli sepsis - no Carbapenemase gene noted   Continue linezolid iv, caspofungin for polymicrobial coverage in septic shock secondary to ischemic bowel  obtain tobramycin level 14 hours after dose - yet so far no carbapenemase detection  can switch valcyte to ganciclovir 5 mg/kg daily (adjusted to crcl)  for completeness would send ETA culture iso L mucus plus/opacities    # initial post operative episode of Fever, sepsis, hypoxic respiratory failure s/p on mechanical ventilation  Ct C/A/P Bilateral lower lobe consolidation with fluid and debris in the right lower lobe bronchi, concerning for aspiration pneumonia.  CMV PCR (11/24) 146 (low level CMV viremia - not likely contributing)  Adenovirus PCR (11/24) Negative  Cryptococcal Serum Ag (11/24) Negative  serum and bronch aspergillus galactomannan negative  WNV Serology and Serum PCR Negative  CT Chest (11/25) Groundglass opacities in the right lower and left upper lobes, possibly infectious in nature.  CT A/P (11/25) No acute process  --> Completed Meropenem 1 gram q 8hr (11/23 >11/29).           Thank you for involving us in the care of this patient  Transplant ID will continue to follow  Please call or page with additional questions  Pager; #[phone]  Teams: from 8 am to 5 pm  Rachele Lewis MD

## 2024-12-07 NOTE — PROGRESS NOTE ADULT - ASSESSMENT
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 RTOR on 12/7.    [] Septic shock  - febrile, neutropenic  - bld cultures sent   - started zosyn/linezolid, broadened to jeniffer/linezoid, tobramycin x 1, caspo   - neupogen 480mcg x1   - ID following   - f/u RVP/sputum cx  - held MMF this am   - albumin bolus 500cc, Plasmalyte 2L  - TTE today  - f/u CMV PCR  - trend lactate  - levo/vaso  - RTOR 12/7:     [] POD #22 s/p OLT   - good graft function; uptrend in INR (likely in setting of sepsis)   - Diet: NPO, IVF  - Hypernatremia resolved  - Pain management: avoid narcotics;   - Bowel regimen  - Strict I&Os, Daily weights  - US: L brachial DVT   - daily PT     [ ] Immunosuppression  - Tacrolimus stopped 11/18 in setting of AMS/Seizure, Started Cyclo 11/24  - Cyclo held, MMF held today, Pred 20mg daily   - PPx: Valcyte, Mepron, Fluc     [] lleus   - Resumed home Linzess  - imaging with distended colon (likely opioid induced)  - s/p Neostigmine x 2  - s/p Relistor, last dose 12/1  - Daily AXR     [] CMV viremia  - CMV  on  11/24  - Repeat CMV PCR 12/2: 34.5  - Valcyte 900mg daily; held 12/6 for neutropenia     [ ] AMS  - improving  - Seroquel  - Reintubated 11/20 Aspiration/hypoxia, extubated 11/24->[id number]/24--> extubated 11/26  - EEG 11/30 negative, Neurology following  - BH following   - off tacro, now on cyclo  - on keppra    [ ] HTN/ pAFib  - cont Digoxin  - metoprolol held (hypotensive)   - Eliquis 5mg bid - held 12/6 (for possible OR intervention)   - Dr. Quintanilla following  - watch Digoxin levels, check with AM labs    [ ] DM  -Lantus/Lispro, adjust prn

## 2024-12-07 NOTE — AIRWAY PLACEMENT NOTE ADULT - DISPOSITION AFTER INTUBATION:
ICU team to manage/patient placed on mechanical ventilation
patient placed on mechanical ventilation

## 2024-12-07 NOTE — PROGRESS NOTE ADULT - NS ATTEND AMEND GEN_ALL_CORE FT
acute change in clinical status yesterday and overnight.  Fever, rising pressor requirements and worsening lactic acidosis refractory to resuscitation.  Now with gram negative bacteremia.  To OR for exploration and proceeded with total colectomy.  Placement of balloon pump intraop for cardiac dysfunction and history of CAD.

## 2024-12-08 NOTE — PROGRESS NOTE ADULT - NS ATTEND AMEND GEN_ALL_CORE FT
Patient seen and examined with team.  Plan as outlined.  Improving gradually with declining lactate and decreasing pressors.  Continues on CRRT.  Bacteremic with E. Coli.  ID following.  Balloon pump in place.  If continues to improve, will proceed to OR tomorrow for removal of packs, washout, ostomy maturation and abdominal closure.  cellcept and cyclo held.  Continues on steroids.

## 2024-12-08 NOTE — PROGRESS NOTE ADULT - SUBJECTIVE AND OBJECTIVE BOX
Patient seen and examined at the bedside.    Remains critically ill on continuous ICU monitoring.      Brief Summary:  75 yo M with Cardiogenic shock s/p IABP insertion, total abdominal colectomy with ileostomy on 12/7/2024    24 Hour events:  Remains on Vasopressin & Dobutamine  Received multiple plasma transfusions       Objective:  Vital Signs Last 24 Hrs  T(C): 36.2 (08 Dec 2024 04:00), Max: 36.5 (07 Dec 2024 10:00)  T(F): 97.2 (08 Dec 2024 04:00), Max: 97.7 (07 Dec 2024 10:00)  HR: 84 (08 Dec 2024 06:00) (84 - 133)  BP: --  BP(mean): --  RR: 19 (08 Dec 2024 06:00) (10 - 23)  SpO2: 98% (08 Dec 2024 06:00) (97% - 100%)    Parameters below as of 08 Dec 2024 04:00  Patient On (Oxygen Delivery Method): ventilator    O2 Concentration (%): 40    Mode: AC/ CMV (Assist Control/ Continuous Mandatory Ventilation)  RR (machine): 18  TV (machine): 500  FiO2: 40  PEEP: 5  ITime: 1  MAP: 9  PIP: 17                Physical Exam:   General: Intubated  Neurology: Sedated  Respiratory: Bilateral breath sounds  CV: Sinus rhythm   Abdominal: Soft, Nontender  Extremities: Warm, well-perfused  Castor  -------------------------------------------------------------------------------------------------------------------------------    Labs:                        9.9    1.96  )-----------( 28       ( 08 Dec 2024 03:42 )             29.8     12-07    151[H]  |  106  |  43[H]  ----------------------------<  114[H]  3.5   |  19[L]  |  1.55[H]    Ca    7.8[L]      07 Dec 2024 21:50  Phos  3.3     12-08  Mg     2.1     12-08    TPro  3.9[L]  /  Alb  2.8[L]  /  TBili  3.9[H]  /  DBili  x   /  AST  4410[H]  /  ALT  1968[H]  /  AlkPhos  62  12-07    LIVER FUNCTIONS - ( 07 Dec 2024 21:50 )  Alb: 2.8 g/dL / Pro: 3.9 g/dL / ALK PHOS: 62 U/L / ALT: 1968 U/L / AST: 4410 U/L / GGT: x           PT/INR - ( 08 Dec 2024 03:42 )   PT: See Note;   INR: See Note ratio         PTT - ( 07 Dec 2024 21:50 )  PTT:41.6 sec  ABG - ( 08 Dec 2024 03:36 )  pH, Arterial: 7.38  pH, Blood: x     /  pCO2: 34    /  pO2: 169   / HCO3: 20    / Base Excess: -4.4  /  SaO2: 99.0          ------------------------------------------------------------------------------------------------------------------------------  Assessment:  75 yo M with cardiogenic shock s/p IABP insertion, total abdominal colectomy with ileostomy on 12/7/2024.     Hemodynamic instability  Hypovolemia  Lactic acidosis  Post op respiratory insufficiency  Acute blood loss anemia  Thrombocytopenia  Leukopenia      Plan:  ***Neuro***  Sedated with Precedex for comfort/vent synchrony   Postoperative acute pain control with Oxycodone and prns.    ***Cardiovascular***  At high risk for hemodynamic instability and cardiac arrhythmias.  Trend Lactate  R. Fem IABP augmenting 1:1  Jacqui 40 ppms  Wean pressors for MAP > 65 mm Hg.  Remains on Dobutamine    ***Pulmonary***  Postoperative acute respiratory insufficiency  Wean ventilator as tolerated.   Deep breathing and coughing exercises.  Wean oxygen as able.    ***GI***  NPO for now.  Protonix for stress ulcer prophylaxis.   Bowel regimen.    ***Renal***  Trend Creatinine.  Castro catheter for strict I/O measurements.  Replete electrolytes as indicated.    ***ID***  Sepsis coverage with Caspofungin   CMV prophylaxis with Ganciclovir  Linezolid for fevers  Meropenem for Bacteremia   Trend leukopenia.    ***Endocrine***  DM - Insulin infusion.    ***Hematology***  Acute blood loss anemia and thrombocytopenia.  Transfused with 5u plasma yesterday, no current transfusion indication         Patient requires continuous monitoring with bedside rhythm monitoring, pulse oximetry monitoring, and continuous central venous and arterial pressure monitoring; and intermittent blood gas analysis. Care plan discussed with the ICU care team.   Patient remains critical, at risk for life threatening decompensation.    I have spent 30 minutes providing critical care management to this patient.    By signing my name below, I, Ashleigh Zimmerman, attest that this documentation has been prepared under the direction and in the presence of Eloisa Wesley MD  Electronically signed: Ashleigh Zimmerman, 12-08-24 @ 05:59    I, Eloisa Wesley MD, personally performed the services described in this documentation. all medical record entries made by the scribe were at my direction and in my presence. I have reviewed the chart and agree that the record reflects my personal performance and is accurate and complete  Electronically signed: Eloisa Wesley MD   Patient seen and examined at the bedside.    Remains critically ill on continuous ICU monitoring.      Brief Summary:  75 yo M with Cardiogenic shock s/p IABP insertion, total abdominal colectomy with ileostomy on 12/7/2024      24 Hour events:  Blood cultures with e coli.  Remains on Dobutamine and Vasopressin.  Brief a fib overnight. Electrolytes repleted.  Lactate downtrending.      Objective:  Vital Signs Last 24 Hrs  T(C): 36.2 (08 Dec 2024 04:00), Max: 36.5 (07 Dec 2024 10:00)  T(F): 97.2 (08 Dec 2024 04:00), Max: 97.7 (07 Dec 2024 10:00)  HR: 84 (08 Dec 2024 06:00) (84 - 133)  BP: --  BP(mean): --  RR: 19 (08 Dec 2024 06:00) (10 - 23)  SpO2: 98% (08 Dec 2024 06:00) (97% - 100%)    Parameters below as of 08 Dec 2024 04:00  Patient On (Oxygen Delivery Method): ventilator    O2 Concentration (%): 40    Mode: AC/ CMV (Assist Control/ Continuous Mandatory Ventilation)  RR (machine): 18  TV (machine): 500  FiO2: 40  PEEP: 5  ITime: 1  MAP: 9  PIP: 17              Physical Exam:   General: Intubated  Neurology: Sedated but follows commands when sedation is off.  Respiratory: Bilateral breath sounds  CV: Sinus rhythm   Abdominal: Soft, Nontender  Extremities: Warm, well-perfused  Castro  -------------------------------------------------------------------------------------------------------------------------------    Labs:                        9.9    1.96  )-----------( 28       ( 08 Dec 2024 03:42 )             29.8     12-07    151[H]  |  106  |  43[H]  ----------------------------<  114[H]  3.5   |  19[L]  |  1.55[H]    Ca    7.8[L]      07 Dec 2024 21:50  Phos  3.3     12-08  Mg     2.1     12-08    TPro  3.9[L]  /  Alb  2.8[L]  /  TBili  3.9[H]  /  DBili  x   /  AST  4410[H]  /  ALT  1968[H]  /  AlkPhos  62  12-07    LIVER FUNCTIONS - ( 07 Dec 2024 21:50 )  Alb: 2.8 g/dL / Pro: 3.9 g/dL / ALK PHOS: 62 U/L / ALT: 1968 U/L / AST: 4410 U/L / GGT: x           PT/INR - ( 08 Dec 2024 03:42 )   PT: See Note;   INR: See Note ratio         PTT - ( 07 Dec 2024 21:50 )  PTT:41.6 sec  ABG - ( 08 Dec 2024 03:36 )  pH, Arterial: 7.38  pH, Blood: x     /  pCO2: 34    /  pO2: 169   / HCO3: 20    / Base Excess: -4.4  /  SaO2: 99.0          ------------------------------------------------------------------------------------------------------------------------------  Assessment:  75 yo M with cardiogenic shock s/p IABP insertion, total abdominal colectomy with ileostomy on 12/7/2024.     Cardiogenic shock  Hypovolemia  Lactic acidosis  Acute respiratory failure  Acute liver injury, transaminitis, hyperbilirubinemia  Acute kidney injury  Hypokalemia  Hypocalcemia  Acute blood loss anemia  Thrombocytopenia  Leukopenia  E coli bacteremia      Plan:  ***Neuro***  Sedated with Precedex for comfort/vent synchrony   Postoperative acute pain control with prns    ***Cardiovascular***  At high risk for ongoing hemodynamic instability and cardiac arrhythmias.  Remains on Dobutamine  Trend Lactate  R. Fem IABP augmenting 1:1  Wean Jacqui to off  Wean pressors for MAP > 65 mm Hg.  Albumin / IVF/ FFP for hypovolemia - monitor VAC outputs.    ***Pulmonary***  Postoperative acute respiratory insufficiency  Wean ventilator as tolerated.   Deep breathing and coughing exercises.  Wean oxygen as able.    ***GI***  s/p liver transplant with acute liver injury from sepsis - trend LFTs.  s/p total abdominal colectomy  NPO for now.  Remains in discontinuity.  VAC with high output but thin drainage - ongoing resuscitation to keep I/O close to even.  Protonix for stress ulcer prophylaxis.   Bowel regimen.    ***Renal***  MORAIMA - on CRRT.  Castro catheter for strict I/O measurements.  Hypokalemia repleted.  Hypocalcemia - Calcium infusion, trend levels.    ***ID***  Merrem for e coli bacteremia  Sepsis coverage with Caspofungin and Linezolid   CMV prophylaxis with Ganciclovir  Trend leukopenia s/p Filgrastin.    ***Endocrine***  Hyperglycemia - Insulin infusion.    ***Hematology***  Acute blood loss anemia and thrombocytopenia.  Trend CBC and monitor for bleeding.  Transfuse as needed.        Patient requires continuous monitoring with bedside rhythm monitoring, pulse oximetry monitoring, and continuous central venous and arterial pressure monitoring; and intermittent blood gas analysis. Care plan discussed with the ICU care team.   Patient remains critical, at risk for life threatening decompensation.    I have spent 55 minutes providing critical care management to this patient.    By signing my name below, I, Ashleigh Zimmerman, attest that this documentation has been prepared under the direction and in the presence of Eloisa Wesley MD  Electronically signed: Ashleigh Zimmerman, 12-08-24 @ 05:59    I, Eloisa Wesley MD, personally performed the services described in this documentation. all medical record entries made by the scribe were at my direction and in my presence. I have reviewed the chart and agree that the record reflects my personal performance and is accurate and complete  Electronically signed: Eloisa Wesley MD   Patient seen and examined at the bedside.    Remains critically ill on continuous ICU monitoring.      Brief Summary:  73 yo M s/p recent liver transplant now with Cardiogenic and Septic shock s/p IABP insertion, total abdominal colectomy on 12/7/2024  Abdomen remains open and bowel is in discontinuity.      24 Hour events:  Blood cultures with e coli.  Remains on Dobutamine and Vasopressin.  A fib overnight.  Lactate downtrending.      Objective:  Vital Signs Last 24 Hrs  T(C): 36.2 (08 Dec 2024 04:00), Max: 36.5 (07 Dec 2024 10:00)  T(F): 97.2 (08 Dec 2024 04:00), Max: 97.7 (07 Dec 2024 10:00)  HR: 84 (08 Dec 2024 06:00) (84 - 133)  BP: --  BP(mean): --  RR: 19 (08 Dec 2024 06:00) (10 - 23)  SpO2: 98% (08 Dec 2024 06:00) (97% - 100%)    Parameters below as of 08 Dec 2024 04:00  Patient On (Oxygen Delivery Method): ventilator    O2 Concentration (%): 40    Mode: AC/ CMV (Assist Control/ Continuous Mandatory Ventilation)  RR (machine): 18  TV (machine): 500  FiO2: 40  PEEP: 5  ITime: 1  MAP: 9  PIP: 17              Physical Exam:   General: Intubated  Neurology: Sedated but follows commands when sedation is off.  Respiratory: Bilateral breath sounds  CV: Sinus rhythm   Abdominal: Soft, Nontender  Extremities: Warm, well-perfused  Castro  -------------------------------------------------------------------------------------------------------------------------------    Labs:                        9.9    1.96  )-----------( 28       ( 08 Dec 2024 03:42 )             29.8     12-07    151[H]  |  106  |  43[H]  ----------------------------<  114[H]  3.5   |  19[L]  |  1.55[H]    Ca    7.8[L]      07 Dec 2024 21:50  Phos  3.3     12-08  Mg     2.1     12-08    TPro  3.9[L]  /  Alb  2.8[L]  /  TBili  3.9[H]  /  DBili  x   /  AST  4410[H]  /  ALT  1968[H]  /  AlkPhos  62  12-07    LIVER FUNCTIONS - ( 07 Dec 2024 21:50 )  Alb: 2.8 g/dL / Pro: 3.9 g/dL / ALK PHOS: 62 U/L / ALT: 1968 U/L / AST: 4410 U/L / GGT: x           PT/INR - ( 08 Dec 2024 03:42 )   PT: See Note;   INR: See Note ratio         PTT - ( 07 Dec 2024 21:50 )  PTT:41.6 sec  ABG - ( 08 Dec 2024 03:36 )  pH, Arterial: 7.38  pH, Blood: x     /  pCO2: 34    /  pO2: 169   / HCO3: 20    / Base Excess: -4.4  /  SaO2: 99.0          ------------------------------------------------------------------------------------------------------------------------------  Assessment:  73 yo M with cardiogenic shock s/p IABP insertion, total abdominal colectomy with ileostomy on 12/7/2024.     Cardiogenic shock  Hypovolemia  Lactic acidosis  Acute respiratory failure  Acute liver injury, transaminitis, hyperbilirubinemia  Acute kidney injury  Hypokalemia  Hypocalcemia  Acute blood loss anemia  Thrombocytopenia  Leukopenia  E coli bacteremia      Plan:  ***Neuro***  Sedated with Precedex for comfort/vent synchrony   Postoperative acute pain control with prns    ***Cardiovascular***  At high risk for ongoing hemodynamic instability and cardiac arrhythmias.  Remains on Dobutamine - trend cardiac index and mixed venous saturation.  Trend Lactate  R. Fem IABP augmenting 1:1  A fib - Amiodarone bolus, aggressive repletion of electrolytes.  Wean Jacqui to off  Wean pressors for MAP > 65 mm Hg.  Albumin / IVF/ FFP for hypovolemia - monitor VAC outputs.    ***Pulmonary***  Postoperative acute respiratory insufficiency  Wean ventilator as tolerated.   Deep breathing and coughing exercises.  Wean oxygen as able.    ***GI***  s/p liver transplant with acute liver injury from sepsis - trend LFTs.  s/p total abdominal colectomy  NPO for now.  Remains in discontinuity.  VAC with high output but thin drainage - ongoing resuscitation to keep I/O close to even.  Protonix for stress ulcer prophylaxis.   Bowel regimen.    ***Renal***  MORAIMA - on CRRT.  Castro catheter for strict I/O measurements.  Hypokalemia repleted.  Hypocalcemia - Calcium infusion, trend levels.    ***ID***  Merrem for e coli bacteremia  Sepsis coverage with Caspofungin and Linezolid   CMV prophylaxis with Ganciclovir  Trend leukopenia s/p Filgrastin.    ***Endocrine***  Hyperglycemia - Insulin infusion.    ***Hematology***  Acute blood loss anemia and thrombocytopenia.  Trend CBC and monitor for bleeding.  Transfuse as needed.        Patient requires continuous monitoring with bedside rhythm monitoring, pulse oximetry monitoring, and continuous central venous and arterial pressure monitoring; and intermittent blood gas analysis. Care plan discussed with the ICU care team.   Patient remains critical, at risk for life threatening decompensation.    I have spent 55 minutes providing critical care management to this patient.    By signing my name below, I, Ashleigh Zimmerman, attest that this documentation has been prepared under the direction and in the presence of Eloisa Wesley MD  Electronically signed: Ashleigh Zimmerman, 12-08-24 @ 05:59    I, Eloisa Wesley MD, personally performed the services described in this documentation. all medical record entries made by the scribe were at my direction and in my presence. I have reviewed the chart and agree that the record reflects my personal performance and is accurate and complete  Electronically signed: Eloisa Wesley MD

## 2024-12-08 NOTE — PROGRESS NOTE ADULT - SUBJECTIVE AND OBJECTIVE BOX
Patient seen and examined at the bedside.    Remained critically ill on continuous ICU monitoring.    OBJECTIVE:  Vital Signs Last 24 Hrs  T(C): 35.8 (08 Dec 2024 20:00), Max: 36.5 (08 Dec 2024 12:00)  T(F): 96.4 (08 Dec 2024 20:00), Max: 97.7 (08 Dec 2024 12:00)  HR: 71 (08 Dec 2024 19:30) (71 - 105)  BP: --  BP(mean): --  RR: 19 (08 Dec 2024 19:30) (18 - 22)  SpO2: 99% (08 Dec 2024 19:30) (97% - 100%)    Parameters below as of 08 Dec 2024 20:00  Patient On (Oxygen Delivery Method): ventilator    O2 Concentration (%): 40    Physical Exam:  General: intubated multiple lines gtt & tubes   Neurology: sedated   Eyes: bilaeral pupils equal and reactive   ENT/Neck: +ETT midline, Neck supple, trachea midline, No JVD   Respiratory: Rales noted bilaterally   CV: RRR, S1S2, no murmurs        [x] Afib  Abdominal: Soft, NT, ND +BS,   Extremities: 1-2+ pedal edema noted, + peripheral pulses         R Fem IABP        Castro  Skin: No Rashes, Hematoma, Ecchymosis                         Assessment:      Plan:   ***Neuro***  [x] Sedated with Precedex       Post operative neuro assessment     ***Cardiovascular***  Labile hemodynamics large volume resuscitation dual pressor + inotrope  Invasive hemodynamic monitoring, assess perfusion indices   AF 70 - 80 / CVP 5 - 15 / MAP 60 -70 / PAP 34/14(21) / CI 2.4 / SvO2 64 / Hct 30.7 / Lactate 8.4  [x] Dobutamine 3 mcgs/kg/min  [x] Amiodarone 1 mg/min  [x] Vasopressin 0.06 units/min  [x] R Fem IABP       Monitor RLE for any bleeding or ischemic complication  Volume: [x] Plt x2        CBC/INR/ Fibrinogen  Reassessment of hemodynamics post resuscitation   [x] Mg >2   Serial EKG and cardiac enzymes     ***Pulmonary***  [x] NO - 5 ppm  Post op vent management  Titration of FiO2 and PEEP, follow SpO2, CXR, blood gases     Mode: AC/ CMV (Assist Control/ Continuous Mandatory Ventilation)  RR (machine): 18  TV (machine): 500  FiO2: 40  PEEP: 5  ITime: 1  MAP: 8  PIP: 20            Will plan to wean & extubate once pt is hemodynamically stable and not bleeding    ***GI***  NPO  [x] Protonix    ***Renal***  GFR 58 / [x] MORAIMA on CRRT  Continue to monitor I/Os, BUN/Creatinine.   Replete lytes PRN  Castro present [x] positive     ***ID***  Meropenem for e coli bacteremia  Sepsis coverage with Caspofungin and Linezolid   CMV prophylaxis with Ganciclovir  Bactrim    ***Endocrine***  HbA1c 5.6%    Patient requires continuous monitoring with bedside rhythm monitoring, pulse oximetry monitoring, and continuous central venous and arterial pressure monitoring; and intermittent blood gas analysis. Care plan discussed with the ICU care team.   Patient remained critical, at risk for life threatening decompensation.    I have spent 50 minutes providing critical care management to this patient.    By signing my name below, I, Jenn Viera, attest that this documentation has been prepared under the direction and in the presence of Bessy Narayanan MD   Electronically signed: Nava Leone, 12-08-24 @ 19:46    I, Bessy Narayanan, personally performed the services described in this documentation. all medical record entries made by the nava were at my direction and in my presence. I have reviewed the chart and agree that the record reflects my personal performance and is accurate and complete  Electronically signed: Bessy Narayanan MD  Patient seen and examined at the bedside.    Remained critically ill on continuous ICU monitoring.    OBJECTIVE:  Vital Signs Last 24 Hrs  T(C): 35.8 (08 Dec 2024 20:00), Max: 36.5 (08 Dec 2024 12:00)  T(F): 96.4 (08 Dec 2024 20:00), Max: 97.7 (08 Dec 2024 12:00)  HR: 71 (08 Dec 2024 19:30) (71 - 105)  BP: --  BP(mean): --  RR: 19 (08 Dec 2024 19:30) (18 - 22)  SpO2: 99% (08 Dec 2024 19:30) (97% - 100%)    Parameters below as of 08 Dec 2024 20:00  Patient On (Oxygen Delivery Method): ventilator    O2 Concentration (%): 40    Physical Exam:  General: intubated multiple lines gtt & tubes   Neurology: sedated   Eyes: bilateral pupils equal and reactive   ENT/Neck: +ETT midline, Neck supple, trachea midline, No JVD   Respiratory: Rales noted bilaterally   CV: RRR, >> AF           Abdominal: Distended + Vac   Extremities: 1-2+ pedal edema noted, + peripheral pulses                      RCFA  IABP          Skin: No Rashes, Hematoma, Ecchymosis                         Assessment:  74 yr male HTN / PAF S/P Ablation / CAD / GONZALES   S/P Liver Tx 11/15   Post op course complicated ischemic bowel & E Coli sepsis   S/P Total colectomy, gut in discontinuity with Abdominal closure Abthera vac  Septic shock with myocardial depression requiring R IABP placement +inotrope   Lactic acidosis   Pancytopenia   MORAIMA>>CVVH   Shock Liver   AF w RVR   Immunosuppression    24 hr event multifactorial shock sepsis, volume loss via VAC >>hypovolemia & cardiac dysfunction  S/P multiple blood products Tx   AF >>>Amio gtt started   IABP advanced x 2 >>in optimal position    Dobutamine reduced then ^ decrease CI & persistent ^ Lactates   INOX decrease from 40 PPM to 5 PPM    Plan:   ***Neuro***  [x] Sedated with Precedex       Post operative neuro assessment     ***Cardiovascular***  Labile hemodynamics large volume resuscitation  pressor + inotrope + NO + IABP    12/07 TTE EF 35-40%   Invasive hemodynamic monitoring, assess serial perfusion indices     AF 70 - 80 / CVP 5 - 12 / MAP 60 -70 / PAP 34/14(21) / CI 2.1 / SvO2 66  / Hct 30.7 / Lactate 8.4  [X] INOX 5 PPM   [x] Dobutamine 3 >>5 mcg/kg/min  [x] Amiodarone 1 mg/min  [x] Vasopressin 0.06 units/min Target MAP >70   [x] R Fem IABP       EKG trigger 1:1 diastolic augmentation       Monitor RLE for any bleeding or ischemic complication                 Volume: Post op products PRBC X3 Plt x 4 Plasma x 3                Albumin 5% 1000 cc                Albumin 25% 200 cc                CBC/INR/ Fibrinogen               PRN Tx as clinically indicated   [x] Mg >2 [x] K>2   [X] Given persistent ^ Lactic acid adequate CI , resolving shock liver , adequate volume status will need abd reexploration         Cont stress dose steroids & Abx   [x] CVVH goal  with gentle volume removal >>even to zero balance   [x] Cellcept & Cyclosporin on hold     ***Pulmonary***  [x] NO - 5 ppm  Post op vent management  Titration of FiO2 and PEEP, follow SpO2, CXR, blood gases     Mode: AC/ CMV (Assist Control/ Continuous Mandatory Ventilation)  RR (machine): 18  TV (machine): 500  FiO2: 40  PEEP: 5  ITime: 1  MAP: 8  PIP: 20                ***GI***  NPO  [x] Protonix    ***Renal***  GFR 58 / [x] MORAIMA on CRRT  Continue to monitor I/Os, BUN/Creatinine.   Replete lytes PRN  Castro present     ***ID***  Meropenem + Zyvox + Caspofungin   Ganciclovir, Bactrim       ***Endocrine***  continue to monitor q 1 hr glucose at risk for hypoglycemia with Liver dysfunction     Patient requires continuous monitoring with bedside rhythm monitoring, pulse oximetry monitoring, and continuous central venous and arterial pressure monitoring; and intermittent blood gas analysis. Care plan discussed with the ICU care team.   Patient remained critical, at risk for life threatening decompensation.    I have spent 50 minutes providing critical care management to this patient.    By signing my name below, I, Jenn Viera, attest that this documentation has been prepared under the direction and in the presence of Bessy Narayanan MD   Electronically signed: Theresa Leone, 12-08-24 @ 19:46    I, Bessy Narayanan, personally performed the services described in this documentation. all medical record entries made by the scribe were at my direction and in my presence. I have reviewed the chart and agree that the record reflects my personal performance and is accurate and complete  Electronically signed: Bessy Narayanan MD

## 2024-12-08 NOTE — PROGRESS NOTE ADULT - SUBJECTIVE AND OBJECTIVE BOX
Follow Up:  septic shock ischemic colitis    Interval History:   Septic shock over the course of 12/5-12-6  with increasing pressor requirement, acute cytopenias,   12/6-12/7  worsening lactic acidosis and decision made to ex-lap, noted to have ischemic /dusky colon s/p colectomy c/f TMC  Intraoperatively: Left, transverse and right colon were dusky and distended severely. Rectum was seen to be healthy. Mesenteric vessels with good doppler signals, hepatic artery with good doppler signals, small bowel as well as liver graft showed some diffuse congestion  Due to patient condition, the decision was made not to mature the ileostomy but instead to leave the bowel ends in discontinuity for a planned second look laparotomy  Also, noted to have LVOT so pressors chenged yesterday to phenilephrine,   now intubated and Impella placed.   Ecoli on BC 11/6 , no resistance genes, susceptibilites pending    12/7-12/8  Afebrile, On dobutamine, , Wound can in place with plan to reexplore  lactic acid trending down  Intraoperative fluid cx NGTD   BC 12/5 Ecoli, susceptibilities pending           Vital Signs Last 24 Hrs  T(C): 36 (08 Dec 2024 08:00), Max: 36.5 (07 Dec 2024 10:00)  T(F): 96.8 (08 Dec 2024 08:00), Max: 97.7 (07 Dec 2024 10:00)  HR: 98 (08 Dec 2024 09:15) (82 - 133)  BP: --  BP(mean): --  RR: 18 (08 Dec 2024 09:15) (10 - 23)  SpO2: 99% (08 Dec 2024 09:15) (97% - 100%)    Parameters below as of 08 Dec 2024 08:00  Patient On (Oxygen Delivery Method): ventilator    Mode: AC/ CMV (Assist Control/ Continuous Mandatory Ventilation)  RR (machine): 18  TV (machine): 500  FiO2: 40  PEEP: 5  ITime: 1  MAP: 9  PIP: 16        PHYSICAL EXAMINATION:  General:intubated  Cardiac: RRR, No M/R/G  Resp: CTAB, No Wh/Rh/Ra  Abdomen: Wound vac in place, soft  MSK: No LE edema. No Calf tenderness  Skin: No rashes or lesions. Skin is warm and dry to the touch.   Neuro: Alert and Awake. CN 2-12 Grossly intact. Moves all four extremities spontaneously.  Psych: alert                       ____________________________________________________  ROS  unable to assess    Allergies  No Known Allergies    __________________________________________________  MEDS:  MEDICATIONS  (STANDING):  dexMEDEtomidine Infusion 0.03 <Continuous>  DOBUTamine Infusion 5 <Continuous>  hydrocortisone sodium succinate Injectable 100 every 8 hours  pantoprazole  Injectable 40 daily  vasopressin Infusion 0.06 <Continuous>    _________________________________________________  ANTIMICOBIALS  caspofungin IVPB 50 every 24 hours  ganciclovir IVPB 120 every 24 hours  linezolid  IVPB 600 every 12 hours  meropenem  IVPB 1000 every 8 hours      GENERAL LABS              10.3                 x    | x    | x            2.16  >-----------< 30      ------------------------< x                     31.1                 x    | x    | x                                            Ca x     Mg x     Ph x          Urinalysis Basic - ( 08 Dec 2024 07:13 )    Color: x / Appearance: x / SG: x / pH: x  Gluc: 107 mg/dL / Ketone: x  / Bili: x / Urobili: x   Blood: x / Protein: x / Nitrite: x   Leuk Esterase: x / RBC: x / WBC x   Sq Epi: x / Non Sq Epi: x / Bacteria: x        _________________________________________________  MICROBIOLOGY  -----------    Culture - Body Fluid with Gram Stain (collected 07 Dec 2024 11:18)  Source: Body Fluid  Gram Stain (07 Dec 2024 18:22):    polymorphonuclear leukocytes seen    No organisms seen    by cytocentrifuge    Culture - Blood (collected 06 Dec 2024 13:30)  Source: .Blood BLOOD  Gram Stain (07 Dec 2024 03:44):    Growth in aerobic bottle: Gram Negative Rods    Growth in anaerobic bottle: Gram Negative Rods  Preliminary Report (07 Dec 2024 21:36):    Growth in aerobic and anaerobic bottles: Escherichia coli    See previous culture [id number]    Culture - Blood (collected 06 Dec 2024 13:25)  Source: .Blood BLOOD  Gram Stain (07 Dec 2024 03:07):    Growth in aerobic and anaerobic bottles: Gram Negative Rods  Preliminary Report (07 Dec 2024 21:33):    Growth in aerobic and anaerobic bottles: Escherichia coli    Direct identification is available within approximately 3-5    hours either by Blood Panel Multiplexed PCR or Direct    MALDI-TOF. Details: [email or web address]  Organism: Blood Culture PCR (07 Dec 2024 05:33)  Organism: Blood Culture PCR (07 Dec 2024 05:33)      Method Type: PCR      -  Escherichia coli: Detec    Culture - Blood (collected 05 Dec 2024 14:15)  Source: .Blood BLOOD  Preliminary Report (07 Dec 2024 18:01):    No growth at 48 Hours    Culture - Blood (collected 05 Dec 2024 14:00)  Source: .Blood BLOOD  Preliminary Report (07 Dec 2024 18:01):    No growth at 48 Hours            CMVPCR Log: Det <1.54 Assay Dynamic Range: 34.5 to 1.0E+07 IU/mL (1.54 to 7.00 Log10 IU/mL)  Assay lower limit of quantification (LLOQ) is 34.5 IU/mL (1.54 Log10  IU/mL)  CMV DNA detected below the LLOQ will be reported as Detected < 34.5 IU/mL  (<1.54 Log10 IU/mL)  Nydia Cytomegalovirus (CMV) is an FDA-cleared quantitative test that  enables the detection and quantitation of CMV DNA in EDTA plasma of  infected transplant patients on the nydia 8800 system. This test was  verified by [employer]. Results should be interpreted  with consideration of all clinical findings and laboratory findings and  should not form the sole basis for a diagnosis or treatment decision. Xsi50XM/mL (12-03 @ 06:11)  CMVPCR Log: Det <1.54 Assay Dynamic Range: 34.5 to 1.0E+07 IU/mL (1.54 to 7.00 Log10 IU/mL)  Assay lower limit of quantification (LLOQ) is 34.5 IU/mL (1.54 Log10  IU/mL)  CMV DNA detected below the LLOQ will be reported as Detected < 34.5 IU/mL  (<1.54 Log10 IU/mL)  Nydia Cytomegalovirus (CMV) is an FDA-cleared quantitative test that  enables the detection and quantitation of CMV DNA in EDTA plasma of  infected transplant patients on the nydia 8800 system. This test was  verified by [facility] [employer]. Results should be interpreted  with consideration of all clinical findings and laboratory findings and  should not form the sole basis for a diagnosis or treatment decision. Lax15MU/mL (12-02 @ 18:19)          Fungitell:   _______________________________________________  PERTINENT IMAGING

## 2024-12-08 NOTE — PROGRESS NOTE ADULT - ASSESSMENT
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 Pt underwent Y90 Sept, 2023 with favorable treatment response.s/p OLT 11/15/24     # S/p OLT 11/15/24 CMV D?R? EBV , toxo D?/R?  Valcyte, atovaquone ppx  Repeat CMV PCR on 12/3/24, <34.5, continue CMV PPx  continue Valcyte 900 mg Q24H  continue Atovaquone 1,500 mg PO Q24H for PCP PPx        # Ileus/ischemic bowel  ileus for >1 week- Cdiff neg 12/1  S/p rectal decompression  # severe septic shock 12/6 and pancytopenia, lactic acidosis  # Ecoli bacteremia in context of above  received meropenem, linezolid/caspofungin + tobramycin x1 on 11/6  BC 12/6 positive  s/p colectomy on 12/6- noted dusky appearance- plan to go back to OR  # cardiogenic shock  on dobutamine- s/p impella  # LLL mucus plug    ---> recommendations  Continue meropenem for Ecoli sepsis - no Carbapenemase gene noted - follow susceptiblites  Continue linezolid iv, caspofungin for polymicrobial coverage in septic shock secondary to ischemic bowel  can switch valcyte to ganciclovir prophylaxis dosing  (adjusted to crcl). Atovaquone on hold   for completeness would send ETA culture iso L mucus plus/opacities, bacterial fungal and also PCP PCR (assess colonization)    # initial post operative episode of Fever, sepsis, hypoxic respiratory failure s/p on mechanical ventilation  Ct C/A/P Bilateral lower lobe consolidation with fluid and debris in the right lower lobe bronchi, concerning for aspiration pneumonia.  CMV PCR (11/24) 146 (low level CMV viremia - not likely contributing)  Adenovirus PCR (11/24) Negative  Cryptococcal Serum Ag (11/24) Negative  serum and bronch aspergillus galactomannan negative  WNV Serology and Serum PCR Negative  CT Chest (11/25) Groundglass opacities in the right lower and left upper lobes, possibly infectious in nature.  CT A/P (11/25) No acute process  --> Completed Meropenem 1 gram q 8hr (11/23 >11/29).           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 Pt underwent Y90 Sept, 2023 with favorable treatment response.s/p OLT 11/15/24     # S/p OLT 11/15/24 CMV D?R? EBV , toxo D?/R?  Valcyte, atovaquone ppx  Repeat CMV PCR on 12/3/24, <34.5, continue CMV PPx  continue Valcyte 900 mg Q24H  continue Atovaquone 1,500 mg PO Q24H for PCP PPx        # Ileus/ischemic bowel  ileus for >1 week- Cdiff neg 12/1  S/p rectal decompression  # severe septic shock 12/6 and pancytopenia, lactic acidosis  # Ecoli bacteremia in context of above  received meropenem, linezolid/caspofungin + tobramycin x1 on 11/6  BC 12/6 positive  s/p colectomy on 12/6- noted dusky appearance- plan to go back to OR  # cardiogenic shock  on dobutamine- s/p impella  # LLL mucus plug    ---> recommendations  Continue meropenem for Ecoli sepsis - no Carbapenemase gene noted - follow susceptiblites  Continue linezolid iv, caspofungin for polymicrobial coverage in septic shock secondary to ischemic bowel  can switch valcyte to ganciclovir prophylaxis dosing  (adjusted to crcl). Atovaquone on hold   -- for completeness would send ETA culture iso L mucus plus/opacities, bacterial fungal and also PCP PCR (assess colonization)    - follow intraoperative fluid cx and plans for repeat exploratory laparotomy    # initial post operative episode of Fever, sepsis, hypoxic respiratory failure s/p on mechanical ventilation  Ct C/A/P Bilateral lower lobe consolidation with fluid and debris in the right lower lobe bronchi, concerning for aspiration pneumonia.  CMV PCR (11/24) 146 (low level CMV viremia - not likely contributing)  Adenovirus PCR (11/24) Negative  Cryptococcal Serum Ag (11/24) Negative  serum and bronch aspergillus galactomannan negative  WNV Serology and Serum PCR Negative  CT Chest (11/25) Groundglass opacities in the right lower and left upper lobes, possibly infectious in nature.  CT A/P (11/25) No acute process  --> Completed Meropenem 1 gram q 8hr (11/23 >11/29).           Thank you for involving us in the care of this patient  Transplant ID will continue to follow  Please call or page with additional questions  Pager; #[phone]  Teams: from 8 am to 5 pm  Rachele Lewis MD

## 2024-12-08 NOTE — PROGRESS NOTE ADULT - ASSESSMENT
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 Post-op course c/b worsening mental status and acute hypoxic respiratory failure requiring multiple intubations/extubations, currently extubated (as of 11/26/24) and colonic ileus s/p several rounds of Relistor and Neostigmine with NGT and rectal tube currently in place now with septic shock of unclear etiology. Transplant nephrology consulted for metabolic acidosis and MORAIMA.     1. s/p OLT 11/15/24 with oliguric MORAIMA in setting of septic shock/Cardiogenic shock. s/p Colectomy 12/7 and s/p IAPB on 12/7  Started on CRRT for worsening acidosis, hypotension on vasopressors, oliguria (SCr on admission was 0.48 11/15/24, up trended to 1.90.) will continue CRRT for now   CT AP non-con: Bilateral renal cysts including cysts with peripheral calcification in the left kidney.  on linezolid, meropenem caspofungin     2. Hypernatremia- Calculated Free water deficit is 3.7L . Needs free water replacement   3. IS per transplant hepatology team

## 2024-12-08 NOTE — PROGRESS NOTE ADULT - SUBJECTIVE AND OBJECTIVE BOX
Transplant Surgery - Multidisciplinary Progress Note  --------------------------------------------------------------  OLT   11/15/2024        POD#23  Colectomy 12/7/2024   POD#1  IABP 12/7    Present:   Patient seen and examined with multidisciplinary Transplant team including Surgeon: Dr. Dagher, Dr. Ozzie Nguyễn, Hepatologist: Dr. Roe SICU team in AM rounds.   Disciplines not in attendance will be notified of the plan.     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     s/p OLT 11/15 with post op course c/b:  s/p Coloctomy 12/7. ORL 2uPRBC/2uFFP  IABP 12/7  Hypoxia: Reintubated 11/20, extubated 11/24->reintubated 11/24, extubated 11/26   Ileus   Fever  A fib  AMS/Seizure   L brachial DVT  Neutropenia    Interval/Overnight Events:                       Immunosuppression:  -Cyclo (held), MMF held this am, on stress dose steroids  -ongoing monitoring for signs of rejection    Potential Discharge date: Pending clinical course  Education: Medications  Plan of care: See Below                      ROS: Unable to assess patient is intubated, sedated    PHYSICAL EXAM:   Constitutional: intubated, sedated   Eyes:  PERRLA  ENMT: nc/at, no thrush   Neck: supple   Respiratory: CTA B/L  Cardiovascular: RRR  Gastrointestinal: incision open, clean dry with Abthera  Genitourinary: nichols  Extremities: SCD's in place and working bilaterally, + LE edema  Neurological: intubated, sedated  Skin: no rashes, ulcerations, lesions   Transplant Surgery - Multidisciplinary Progress Note  --------------------------------------------------------------  OLT   11/15/2024        POD#23  Colectomy 12/7/2024   POD#1  IABP 12/7    Present:   Patient seen and examined with multidisciplinary Transplant team including Surgeon: Dr. Dagher, Dr. Ozzie Nguyễn, Hepatologist: Dr. Roe and SICU team in AM rounds.   Disciplines not in attendance will be notified of the plan.     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     s/p OLT 11/15 with post op course c/b:  s/p Coloctomy 12/7. ORL 2uPRBC/2uFFP  IABP 12/7  Hypoxia: Reintubated 11/20, extubated 11/24->reintubated 11/24, extubated 11/26   Ileus   Fever  A fib  AMS/Seizure   L brachial DVT  Neutropenia    Interval/Overnight Events:     - intubated, on pressors, broad spectrum abx- afebrile\  - Liver US; patent vessels  - LFT's trending down, lactate stagnant  - Started CRRT      Immunosuppression:  -Cyclo (held), MMF held this am, on stress dose steroids  -ongoing monitoring for signs of rejection    Potential Discharge date: Pending clinical course  Education: Medications  Plan of care: See Below      MEDICATIONS  (STANDING):  albumin human  5% IVPB 250 milliLiter(s) IV Intermittent every 30 minutes  Calcium Chloride 5 Gram(s),Sodium Chloride 400 milliLiter(s) 5 Gram(s) (16.67 mL/Hr) IV Continuous <Continuous>  caspofungin IVPB 50 milliGRAM(s) IV Intermittent every 24 hours  chlorhexidine 0.12% Liquid 15 milliLiter(s) Oral Mucosa every 12 hours  chlorhexidine 2% Cloths 1 Application(s) Topical daily  CRRT Treatment    <Continuous>  dexMEDEtomidine Infusion 0.03 MICROgram(s)/kG/Hr (0.7 mL/Hr) IV Continuous <Continuous>  dextrose 5%. 1000 milliLiter(s) (50 mL/Hr) IV Continuous <Continuous>  DOBUTamine Infusion 3 MICROgram(s)/kG/Min (8.42 mL/Hr) IV Continuous <Continuous>  ganciclovir IVPB 120 milliGRAM(s) IV Intermittent every 24 hours  hydrocortisone sodium succinate Injectable 50 milliGRAM(s) IV Push every 8 hours  linezolid  IVPB 600 milliGRAM(s) IV Intermittent every 12 hours  meropenem  IVPB 1000 milliGRAM(s) IV Intermittent every 8 hours  pantoprazole  Injectable 40 milliGRAM(s) IV Push daily  PrismaSATE Dialysate BGK 4 / 2.5 5000 milliLiter(s) (1300 mL/Hr) CRRT <Continuous>  PrismaSOL Filtration BGK 4 / 2.5 5000 milliLiter(s) (1100 mL/Hr) CRRT <Continuous>  PrismaSOL Filtration BGK 4 / 2.5 5000 milliLiter(s) (200 mL/Hr) CRRT <Continuous>  sodium chloride 0.9%. 1000 milliLiter(s) (10 mL/Hr) IV Continuous <Continuous>  trimethoprim / sulfamethoxazole IVPB 160 milliGRAM(s) IV Intermittent daily  vasopressin Infusion 0.06 Unit(s)/Min (9 mL/Hr) IV Continuous <Continuous>    MEDICATIONS  (PRN):      PAST MEDICAL & SURGICAL HISTORY:  Diabetes      Transaminitis      Paroxysmal atrial fibrillation      Depression      BPH (benign prostatic hyperplasia)      Hypertension      Chronic atrial fibrillation      Coronary artery disease      Hepatocellular carcinoma      DM (diabetes mellitus)      HTN (hypertension)      Paroxysmal atrial fibrillation      Cirrhosis      HCC (hepatocellular carcinoma)      History of BPH      History of laparoscopic cholecystectomy      History of lumbar laminectomy      H/O prior ablation treatment      H/O percutaneous left heart catheterization          Vital Signs Last 24 Hrs  T(C): 36.5 (08 Dec 2024 12:00), Max: 36.5 (08 Dec 2024 12:00)  T(F): 97.7 (08 Dec 2024 12:00), Max: 97.7 (08 Dec 2024 12:00)  HR: 98 (08 Dec 2024 12:30) (82 - 110)  BP: --  BP(mean): --  RR: 19 (08 Dec 2024 12:30) (18 - 23)  SpO2: 99% (08 Dec 2024 12:30) (97% - 100%)    Parameters below as of 08 Dec 2024 12:00  Patient On (Oxygen Delivery Method): ventilator    O2 Concentration (%): 40    I&O's Summary    07 Dec 2024 07:01  -  08 Dec 2024 07:00  --------------------------------------------------------  IN: 6725.7 mL / OUT: 6572 mL / NET: 153.7 mL    08 Dec 2024 07:01  -  08 Dec 2024 12:57  --------------------------------------------------------  IN: 1850.8 mL / OUT: 170 mL / NET: 1680.8 mL                              9.9    2.48  )-----------( 37       ( 08 Dec 2024 12:14 )             29.8     12-08    144  |  106  |  37[H]  ----------------------------<  97  3.9   |  19[L]  |  1.37[H]    Ca    8.1[L]      08 Dec 2024 12:13  Phos  3.0     12-08  Mg     2.1     12-08    TPro  4.1[L]  /  Alb  2.7[L]  /  TBili  4.3[H]  /  DBili  x   /  AST  1303[H]  /  ALT  1172[H]  /  AlkPhos  67  12-08          Culture - Body Fluid with Gram Stain (collected 12-07-24 @ 11:18)  Source: Body Fluid  Gram Stain (12-07-24 @ 18:22):    polymorphonuclear leukocytes seen    No organisms seen    by cytocentrifuge  Preliminary Report (12-08-24 @ 10:36):    No growth to date.    Culture - Blood (collected 12-06-24 @ 13:30)  Source: .Blood BLOOD  Gram Stain (12-07-24 @ 03:44):    Growth in aerobic bottle: Gram Negative Rods    Growth in anaerobic bottle: Gram Negative Rods  Preliminary Report (12-07-24 @ 21:36):    Growth in aerobic and anaerobic bottles: Escherichia coli    See previous culture [id number]    Culture - Blood (collected 12-06-24 @ 13:25)  Source: .Blood BLOOD  Gram Stain (12-07-24 @ 03:07):    Growth in aerobic and anaerobic bottles: Gram Negative Rods  Preliminary Report (12-07-24 @ 21:33):    Growth in aerobic and anaerobic bottles: Escherichia coli    Direct identification is available within approximately 3-5    hours either by Blood Panel Multiplexed PCR or Direct    MALDI-TOF. Details: [email or web address]  Organism: Blood Culture PCR (12-07-24 @ 05:33)  Organism: Blood Culture PCR (12-07-24 @ 05:33)    Culture - Blood (collected 12-05-24 @ 14:15)  Source: .Blood BLOOD  Preliminary Report (12-07-24 @ 18:01):    No growth at 48 Hours    Culture - Blood (collected 12-05-24 @ 14:00)  Source: .Blood BLOOD  Preliminary Report (12-07-24 @ 18:01):    No growth at 48 Hours          ROS: Unable to assess patient is intubated, sedated    PHYSICAL EXAM:   Constitutional: intubated, sedated   Eyes:  PERRLA  ENMT: nc/at, no thrush   Neck: supple   Respiratory: CTA B/L  Cardiovascular: RRR  Gastrointestinal: incision open, clean dry with Abthera  Genitourinary: nichols  Extremities: SCD's in place and working bilaterally, + LE edema  Neurological: intubated, sedated  Skin: no rashes, ulcerations, lesions

## 2024-12-08 NOTE — CONSULT NOTE ADULT - ASSESSMENT
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 Pt underwent Y90 Sept, 2023 with favorable treatment response.s/p OLT 11/15/24.    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 Pt underwent Y90 2023 with favorable treatment response.s/p OLT 11/15/24.    post op course c/b  recurrent intubation  AMS  \Sepsis with \e.coli bactremia and CMV viremia  MORAIMA requiring CVVH  Ileus and ischemic colon s/p total colectomy  with open abdomen  intra op TTE with severe Biv dysfunction s/p IABP placed    Plan:  will cont IABP and  5mcg/kg/min  RA 4, PAD 15, MVO2 in 70-80% with CI 4  will wean IABP After abdomen closure.   on CVVH though he has leg edema, his intracvascular volume is normal based on RA pressure, so will cont to do net even.   ID following  cont broad spectrum abx  IS as per Transplant hepatology  Renal following  plan d/w CTICU team  d/w Pt's family Ankit Magdaleno and Laney

## 2024-12-08 NOTE — PROGRESS NOTE ADULT - ASSESSMENT
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 RTOR on 12/7.    [] Septic shock/Cardiogenic shock  [] s/p Colectomy 12/7 POD#1  [] IAPB 12/7: CTICU on board  - bld cx GNR  - started zosyn/linezolid, broadened to jeniffer/linezoid, tobramycin x 1, caspo   - neupogen 480mcg x1   - ID following   - f/u RVP/sputum cx  - f/u CMV PCR  - trend lactate    [] POD #23 s/p OLT   - fu rpt liver US  - trend LFT's  - Diet: NPO, IVF  - Pain management: avoid narcotics  - Bowel regimen  - Strict I&Os, Daily weights  - US: L brachial DVT   - daily PT     [ ] Immunosuppression  - Tacrolimus stopped 11/18 in setting of AMS/Seizure, Started Cyclo 11/24  - Cyclo held, MMF held today, on stress dose steroids  - PPx: Valcyte HELD due to neutropenia, bactrim held due to MORAIMA    [] lleus   -@ home Linzess  - imaging with distended colon (likely opioid induced)  - s/p Neostigmine x 2  - s/p Relistor, last dose 12/1  - Daily AXR     [] CMV viremia  - CMV  on  11/24  - Repeat CMV PCR 12/2: 34.5  - Valcyte 900mg daily; held 12/6 for neutropenia     [ ] AMS  - Reintubated 11/20 Aspiration/hypoxia, extubated 11/24->[id number]--> extubated 11/26  - EEG 11/30 negative, Neurology following  - BH following   - off tacro  - on keppra    [ ] HTN/ pAFib  - cont Digoxin  - metoprolol held (hypotensive)   - Eliquis 5mg bid - held 12/6 (for possible OR intervention)   - Dr. 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 RTOR on 12/7.    [] Septic shock/Cardiogenic shock  [] s/p Colectomy 12/7 POD#1  [] IAPB 12/7: CTICU on board  - bld cx: E.coli, fu rpt bcx 12/6  - started zosyn/linezolid, broadened to jeniffer/linezoid, tobramycin x 1, caspo   - neupogen 480mcg x2  - ID following   - f/u RVP/sputum cx  - f/u CMV PCR, PCP PCR  - trend lactate  - CRRT initiated 12/7    [] POD #23 s/p OLT   - rpt liver US: patent vessels, low RI's in L and R hepatic arteries  - trend LFT's  - Diet: NPO, IVF  - Pain management: avoid narcotics  - Bowel regimen  - Strict I&Os, nichols, wound vac  - US: L brachial DVT   - daily PT     [ ] Immunosuppression  - Tacrolimus stopped 11/18 in setting of AMS/Seizure, Started Cyclo 11/24  - Cyclo held, MMF held today, on stress dose steroids  - PPx: Valcyte HELD due to neutropenia, resumed bactrim    [] lleus   -@ home Linzess  - imaging with distended colon (likely opioid induced)  - s/p Neostigmine x 2  - s/p Relistor, last dose 12/1  - Daily AXR     [] CMV viremia  - CMV  on  11/24  - Repeat CMV PCR 12/2: 34.5  - Valcyte 900mg daily; held 12/6 for neutropenia     [ ] AMS  - Reintubated 11/20 Aspiration/hypoxia, extubated 11/24->[id number]--> extubated 11/26  - EEG 11/30 negative, Neurology following  - BH following   - off tacro  - on keppra    [ ] HTN/ pAFib  - cont Digoxin  - metoprolol held (hypotensive)   - Eliquis 5mg bid - held 12/6 (for possible OR intervention)   - Dr. Quintanilla following  - watch Digoxin levels, check with AM labs    [ ] DM  -Lantus/Lispro, adjust prn

## 2024-12-08 NOTE — CONSULT NOTE ADULT - SUBJECTIVE AND OBJECTIVE BOX
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 Pt underwent Y90 2023 with favorable treatment response.s/p OLT 11/15/24.    post op course c/b  recurrent intubation  AMS  \Sepsis with \e.coli bactremia and CMV viremia  MORAIMA requiring CVVH  Ileus and ischemic colon s/p total colectomy   intra op TTE with severe Biv dysfunction s/p IABP placed      Medications:  albumin human 25% IVPB 100 milliLiter(s) IV Intermittent every 8 hours  aMIOdarone Infusion 0.5 mG/Min IV Continuous <Continuous>  Calcium Chloride 5 Gram(s),Sodium Chloride 0.9% 400 milliLiter(s) 5 Gram(s) IV Continuous <Continuous>  caspofungin IVPB 50 milliGRAM(s) IV Intermittent every 24 hours  cefepime   IVPB      cefepime   IVPB 1000 milliGRAM(s) IV Intermittent every 8 hours  chlorhexidine 0.12% Liquid 15 milliLiter(s) Oral Mucosa every 12 hours  chlorhexidine 2% Cloths 1 Application(s) Topical <User Schedule>  CRRT Treatment    <Continuous>  dexMEDEtomidine Infusion 1 MICROgram(s)/kG/Hr IV Continuous <Continuous>  dextrose 50% Injectable 50 milliLiter(s) IV Push every 15 minutes  DOBUTamine Infusion 5 MICROgram(s)/kG/Min IV Continuous <Continuous>  ganciclovir IVPB 120 milliGRAM(s) IV Intermittent every 24 hours  hydrocortisone sodium succinate Injectable 50 milliGRAM(s) IV Push every 8 hours  insulin regular Infusion 1 Unit(s)/Hr IV Continuous <Continuous>  linezolid  IVPB 600 milliGRAM(s) IV Intermittent every 12 hours  metroNIDAZOLE  IVPB 500 milliGRAM(s) IV Intermittent every 12 hours  norepinephrine Infusion 0.01 MICROgram(s)/kG/Min IV Continuous <Continuous>  pantoprazole  Injectable 40 milliGRAM(s) IV Push every 12 hours  Phoxillum Filtration BK 4 / 2.5 5000 milliLiter(s) CRRT <Continuous>  PrismaSATE Dialysate BGK 4 / 2.5 5000 milliLiter(s) CRRT <Continuous>  PrismaSOL Filtration BGK 4 / 2.5 5000 milliLiter(s) CRRT <Continuous>  sodium phosphate 15 milliMole(s)/250 mL IVPB 15 milliMole(s) IV Intermittent once  sodium phosphate 15 milliMole(s)/250 mL IVPB 15 milliMole(s) IV Intermittent once  trimethoprim / sulfamethoxazole IVPB 160 milliGRAM(s) IV Intermittent daily  vasopressin Infusion 0.01 Unit(s)/Min IV Continuous <Continuous>    Vitals:  T(C): 36.7 (12-10-24 @ 08:00), Max: 37.5 (24 @ 14:30)  HR: 76 (12-10-24 @ 11:30) (67 - 88)  BP: --  BP(mean): --  RR: 20 (12-10-24 @ 11:30) (18 - 25)  SpO2: 94% (12-10-24 @ 11:30) (94% - 100%)    Telemetry:    Daily     Daily Weight in k (10 Dec 2024 00:00)  Weight (kg): 93.6 (24 @ 10:11)    I&O's Summary    09 Dec 2024 07:01  -  10 Dec 2024 07:00  --------------------------------------------------------  IN: 2759.5 mL / OUT: 3793 mL / NET: -1033.5 mL    10 Dec 2024 07:01  -  10 Dec 2024 12:19  --------------------------------------------------------  IN: 551 mL / OUT: 742 mL / NET: -191 mL        Physical Exam:  Appearance: intubated and sedated  Cardiovascular: RRR, Normal S1 S2, No murmurs/rubs/gallops  Respiratory: Clear to auscultation bilaterally  Gastrointestinal: soft, non-tender, non-distended	  Skin: no skin lesions  Neurologic: Non-focal  Extremities: No LE edema, warm and well perfused        Labs:                        10.4   6.43  )-----------( 22       ( 10 Dec 2024 08:42 )             31.1     12-10    140  |  103  |  32[H]  ----------------------------<  131[H]  4.3   |  17[L]  |  1.10    Ca    7.7[L]      10 Dec 2024 08:46  Phos  2.1     12-10  Mg     2.3     12-10    TPro  3.7[L]  /  Alb  3.0[L]  /  TBili  7.8[H]  /  DBili  x   /  AST  65[H]  /  ALT  231[H]  /  AlkPhos  64  12-10      PT/INR - ( 10 Dec 2024 08:42 )   PT: 22.0 sec;   INR: 1.94 ratio         PTT - ( 10 Dec 2024 08:42 )  PTT:54.9 sec        Oxygen Saturation, Mixed: 75.3 (12-10 @ 08:27)  Oxygen Saturation, Mixed: 82.1 (12-10 @ 03:59)  Oxygen Saturation, Mixed: 85.6 ( @ 23:56)  Oxygen Saturation, Mixed: 85.4 ( @ 20:00)  Oxygen Saturation, Mixed: 91.1 ( @ 17:30)  Oxygen Saturation, Mixed: 83.6 ( @ 14:45)  Oxygen Saturation, Mixed: 78.8 ( @ 10:00)  Oxygen Saturation, Mixed: 76.2 ( @ 08:25)  Oxygen Saturation, Mixed: 75.3 ( @ 06:11)  Oxygen Saturation, Mixed: 75.6 ( @ 03:59)  Oxygen Saturation, Mixed: 76.1 ( @ 02:00)  Oxygen Saturation, Mixed: 79.0 ( @ 00:06)  Oxygen Saturation, Mixed: 73.6 ( @ 22:00)  Oxygen Saturation, Mixed: 66.6 ( @ 20:05)  Oxygen Saturation, Mixed: 71.5 ( @ 17:58)  Oxygen Saturation, Mixed: 71.6 ( @ 14:30)  Oxygen Saturation, Mixed: 76.4 ( @ 13:18)  Oxygen Saturation, Mixed: 79.3 ( @ 11:11)  Oxygen Saturation, Mixed: 75.3 ( @ 06:35)  Oxygen Saturation, Mixed: 90.2 ( @ 03:36)  Oxygen Saturation, Mixed: 86.1 ( @ 00:55)  Oxygen Saturation, Mixed: 82.8 ( @ 21:50)  Oxygen Saturation, Mixed: 86.6 ( @ 18:04)  Oxygen Saturation, Mixed: 85.4 ( @ 15:16)  Oxygen Saturation, Mixed: 91.3 ( @ 13:40)  Oxygen Saturation, Mixed: 92.4 ( @ 12:25)    Lactate Dehydrogenase, Serum: 231 U/L (12-10 @ 00:29)  Lactate Dehydrogenase, Serum: 241 U/L ( @ 00:32)  Lactate Dehydrogenase, Serum: 288 U/L ( @ 18:30)  Lactate Dehydrogenase, Serum: 375 U/L ( @ 12:13)    Blood Gas Arterial, Lactate: 7.5 mmol/L (12-10 @ 07:25)  Blood Gas Arterial, Lactate: 8.1 mmol/L (12-10 @ 03:59)  Blood Gas Arterial, Lactate: 8.8 mmol/L ( @ 23:56)  Blood Gas Arterial, Lactate: 8.7 mmol/L ( @ 20:00)  Blood Gas Arterial, Lactate: 9.1 mmol/L ( @ 17:30)  Blood Gas Arterial, Lactate: 9.3 mmol/L ( @ 14:45)  Blood Gas Arterial, Lactate: 9.5 mmol/L ( @ 13:36)  Blood Gas Arterial, Lactate: 9.1 mmol/L ( @ 12:52)  Blood Gas Arterial, Lactate: 9.3 mmol/L ( @ 12:17)  Blood Gas Arterial, Lactate: 9.4 mmol/L ( @ 10:00)  Blood Gas Arterial, Lactate: 8.5 mmol/L ( @ 08:25)  Blood Gas Arterial, Lactate: 8.4 mmol/L ( @ 06:11)  Blood Gas Arterial, Lactate: 8.2 mmol/L ( @ 03:59)  Blood Gas Arterial, Lactate: 8.5 mmol/L ( @ 02:00)  Blood Gas Arterial, Lactate: 9.2 mmol/L ( @ 00:06)  Blood Gas Arterial, Lactate: 8.6 mmol/L ( @ 22:00)  Blood Gas Arterial, Lactate: 8.9 mmol/L ( @ 20:05)  Blood Gas Arterial, Lactate: 8.4 mmol/L ( @ 17:58)  Blood Gas Arterial, Lactate: 8.0 mmol/L ( @ 14:30)  Blood Gas Arterial, Lactate: 8.2 mmol/L ( @ 13:18)  Blood Gas Arterial, Lactate: 8.5 mmol/L ( @ 11:11)  Blood Gas Arterial, Lactate: 8.9 mmol/L ( @ 08:05)  Blood Gas Arterial, Lactate: 9.8 mmol/L ( @ 06:35)  Blood Gas Arterial, Lactate: 5.9 mmol/L ( @ 06:00)  Blood Gas Arterial, Lactate: 8.9 mmol/L ( @ 05:15)  Blood Gas Arterial, Lactate: 10.4 mmol/L ( @ 03:36)  Blood Gas Arterial, Lactate: 10.9 mmol/L ( @ 00:55)  Blood Gas Arterial, Lactate: 13.1 mmol/L ( @ 21:50)  Blood Gas Arterial, Lactate: 11.8 mmol/L ( @ 19:13)  Blood Gas Arterial, Lactate: 13.0 mmol/L ( @ 18:04)  Blood Gas Arterial, Lactate: >16.0 mmol/L ( @ 15:16)  Blood Gas Arterial, Lactate: 14.6 mmol/L ( @ 13:40)  Blood Gas Arterial, Lactate: 14.9 mmol/L ( @ 12:25)

## 2024-12-08 NOTE — PROGRESS NOTE ADULT - SUBJECTIVE AND OBJECTIVE BOX
[facility] DIVISION OF KIDNEY DISEASES AND HYPERTENSION -- FOLLOW UP NOTE  --------------------------------------------------------------------------------  Chief Complaint:    24 hour events/subjective:  Patient was seen and examined in ICU  s/p Total abdominal colectomy with ileostomy and IABP on 12/7     PAST HISTORY  --------------------------------------------------------------------------------  No significant changes to PMH, PSH, FHx, SHx, unless otherwise noted    ALLERGIES & MEDICATIONS  --------------------------------------------------------------------------------  Allergies    No Known Allergies    Intolerances      Standing Inpatient Medications  Calcium Chloride 5 Gram(s),Sodium Chloride 400 milliLiter(s) 5 Gram(s) IV Continuous <Continuous>  caspofungin IVPB 50 milliGRAM(s) IV Intermittent every 24 hours  chlorhexidine 0.12% Liquid 15 milliLiter(s) Oral Mucosa every 12 hours  CRRT Treatment    <Continuous>  dexMEDEtomidine Infusion 0.03 MICROgram(s)/kG/Hr IV Continuous <Continuous>  dextrose 5%. 1000 milliLiter(s) IV Continuous <Continuous>  DOBUTamine Infusion 5 MICROgram(s)/kG/Min IV Continuous <Continuous>  ganciclovir IVPB 120 milliGRAM(s) IV Intermittent every 24 hours  hydrocortisone sodium succinate Injectable 100 milliGRAM(s) IV Push every 8 hours  linezolid  IVPB 600 milliGRAM(s) IV Intermittent every 12 hours  meropenem  IVPB 1000 milliGRAM(s) IV Intermittent every 8 hours  pantoprazole  Injectable 40 milliGRAM(s) IV Push daily  PrismaSATE Dialysate BGK 4 / 2.5 5000 milliLiter(s) CRRT <Continuous>  PrismaSOL Filtration BGK 4 / 2.5 5000 milliLiter(s) CRRT <Continuous>  PrismaSOL Filtration BGK 4 / 2.5 5000 milliLiter(s) CRRT <Continuous>  sodium chloride 0.9%. 1000 milliLiter(s) IV Continuous <Continuous>  vasopressin Infusion 0.06 Unit(s)/Min IV Continuous <Continuous>    PRN Inpatient Medications      REVIEW OF SYSTEMS- unable to obtain as patient is intubated and sedated   --------------------------------------------------------------------------------      VITALS/PHYSICAL EXAM  --------------------------------------------------------------------------------  T(C): 36.2 (12-08-24 @ 04:00), Max: 36.5 (12-07-24 @ 10:00)  HR: 100 (12-08-24 @ 07:45) (82 - 133)  BP: --  RR: 19 (12-08-24 @ 07:45) (10 - 23)  SpO2: 99% (12-08-24 @ 07:45) (97% - 100%)  Wt(kg): --  Height (cm): 182.9 (12-07-24 @ 03:58)  Weight (kg): 93.6 (12-07-24 @ 03:58)  BMI (kg/m2): 28 (12-07-24 @ 03:58)  BSA (m2): 2.16 (12-07-24 @ 03:58)      12-07-24 @ 07:01  -  12-08-24 @ 07:00  --------------------------------------------------------  IN: 6725.7 mL / OUT: 6572 mL / NET: 153.7 mL      Physical Exam:  Gen: critically ill, intubated   Pulm: rales bilaterally  CV: tachycardic, S1S2+  Abd: + distended. +incisions. +NGT in place.   : +nichols catheter with dark colored urine  MSK: 2+ pitting edema to knee  Skin: Warm  Access: R TLC, femoral A-line.       LABS/STUDIES  --------------------------------------------------------------------------------              9.9    1.96  >-----------<  28       [12-08-24 @ 03:42]              29.8     151  |  106  |  43  ----------------------------<  114      [12-07-24 @ 21:50]  3.5   |  19  |  1.55        Ca     7.8     [12-07-24 @ 21:50]      Mg     2.1     [12-08-24 @ 03:42]      Phos  3.3     [12-08-24 @ 03:42]    TPro  3.9  /  Alb  2.8  /  TBili  3.9  /  DBili  x   /  AST  4410  /  ALT  1968  /  AlkPhos  62  [12-07-24 @ 21:50]    PT/INR: PT 31.6 , INR 2.78       [12-08-24 @ 06:23]  PTT: 36.5       [12-08-24 @ 06:23]      Creatinine Trend:  SCr 1.55 [12-07 @ 21:50]  SCr 1.70 [12-07 @ 16:52]  SCr 1.85 [12-07 @ 10:35]  SCr 1.92 [12-07 @ 03:45]  SCr 1.78 [12-06 @ 23:38]            Urinalysis - [12-07-24 @ 21:50]      Color  / Appearance  / SG  / pH       Gluc 114 / Ketone   / Bili  / Urobili        Blood  / Protein  / Leuk Est  / Nitrite       RBC  / WBC  / Hyaline  / Gran  / Sq Epi  / Non Sq Epi  / Bacteria       Vitamin D (25OH) <6.0      [11-21-24 @ 08:32]  TSH 1.59      [11-21-24 @ 08:32]  Lipid: chol 114, , HDL 47, LDL --      [04-24-24 @ 06:48]    HBsAb 41.9      [11-15-24 @ 00:41]  HBsAg Nonreact      [11-15-24 @ 00:41]  HBcAb Nonreact      [11-15-24 @ 00:41]  HCV 0.06, Nonreact      [11-15-24 @ 00:41]  HIV Nonreact      [11-15-24 @ 00:41]

## 2024-12-09 ENCOUNTER — RESULT REVIEW (OUTPATIENT)
Age: 74
End: 2024-12-09

## 2024-12-09 ENCOUNTER — TRANSCRIPTION ENCOUNTER (OUTPATIENT)
Age: 74
End: 2024-12-09

## 2024-12-09 NOTE — PROGRESS NOTE ADULT - ASSESSMENT
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 Pt underwent Y90 Sept, 2023 with favorable treatment response.s/p OLT 11/15/24     on 12/7/24 s/p Total abdominal colectomy with ileostomy  on 12/9 s/p ex-lap with ileostomy.    # Ileus/ischemic bowel  ileus for >1 week- Cdiff neg 12/1  S/p rectal decompression  # severe septic shock 12/6 and pancytopenia, lactic acidosis  # Ecoli bacteremia in context of above  received meropenem, linezolid/caspofungin + tobramycin x1 on 11/6  BC 12/6 positive  s/p colectomy on 12/6- noted dusky appearance- plan to go back to OR  # cardiogenic shock  on dobutamine- s/p impella  # LLL mucus plug  --Stop Meropenem  --Start Cefepime 1g IV Q8H  --Start Metronidazole 500 mg IV Q12H  --Can continue Linezolid and Caspofungin for now  --Continue to follow CBC with diff  --Continue to follow temperature curve  --Follow up on preliminary blood cultures    #s/p OLT 11/15/24 CMV D?R? EBV , toxo D?/R?  --Continue Ganciclovir 120 mg IV Q24  --Continue equivalent of SS Bactrim Q24H for PCP PPx    #Fever, sepsis, hypoxic respiratory failure s/p on mechanical ventilation  CT C/A/P Bilateral lower lobe consolidation with fluid and debris in the right lower lobe bronchi, concerning for aspiration pneumonia.  CMV PCR (11/24) 146 (low level CMV viremia - not likely contributing)  Adenovirus PCR (11/24) Negative  Cryptococcal Serum Ag (11/24) Negative  serum and bronch aspergillus galactomannan negative  WNV Serology and Serum PCR Negative  CT Chest (11/25) Groundglass opacities in the right lower and left upper lobes, possibly infectious in nature.  CT A/P (11/25) No acute process  s/p Meropenem 1 gram q 8hr (11/23 >11/29)    I will continue to follow. Please feel free to contact me with any further questions.    Kyle Junior M.D.  [facility] Division of Infectious Disease  8AM-5PM Monday - Friday: Available on [employer] Teams  After Hours and Holidays (or if no response on [employer] Teams): Please contact the Infectious Diseases Office at [phone]    The above assessment and plan were discussed with Anne, transplant surgery PA

## 2024-12-09 NOTE — PROGRESS NOTE ADULT - ASSESSMENT
74 year-old with known coronary artery disease as above presents for liver transplant.  Seen to have chest pain post transplant.  It was atypical of angina and has resolved.  Troponin remained negative.    Now with atrial fibrillation with RVR (11/19) - rate control with beta-blocker as needed, now status post Digoxin load  Converted to sinus rhythm on 12/2.    Rate control with betablocker and Digoxin - uptitrate beta-blocker as tolerated    Patient was septic on Friday, 12/6, and at that time, he was seen to be hyperdynamic with TODD and severe LVOT gradient.  Post ex-lap, patient seen to have newly reduced LVEF.  IABP placed. Inotrope and pressor support.  Recommend weaning both inotrope and pressors. Keep MAP >65 mmHg. Monitor output via Linch.

## 2024-12-09 NOTE — PRE-ANESTHESIA EVALUATION ADULT - NSANTHOSAYNRD_GEN_A_CORE
No. JOE screening performed.  STOP BANG Legend: 0-2 = LOW Risk; 3-4 = INTERMEDIATE Risk; 5-8 = HIGH Risk
No. JOE screening performed.  STOP BANG Legend: 0-2 = LOW Risk; 3-4 = INTERMEDIATE Risk; 5-8 = HIGH Risk

## 2024-12-09 NOTE — CONSULT NOTE ADULT - ASSESSMENT
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 Post-op course c/b worsening mental status and acute hypoxic respiratory failure requiring multiple intubations/extubations, currently intubated (as of 12/7/2024) and cardiogenic shock s/p Percutaneous insertion of an intra-aortic balloon pump and septic shock/colonic ischemia s/p total abdominal colectomy with ileostomy on 12/7/24. Urology consulted for scrotal edema w/ large skin breakdown. The symptom start one week ago and getting worse recent two days with large area skin breakdown and redness on the whole scrotal area, as well the bilateral groin and internal thigh. Castro in place with clear yellowish colored urine.   In CTU, pt WBC 7, H/H 10/32, Cr 1.29, Lactate 8.7. blood culture on 12/6 growth E. coli. Patient off pressor currently.     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 Post-op course c/b worsening mental status and acute hypoxic respiratory failure requiring multiple intubations/extubations, currently intubated (as of 12/7/2024) and cardiogenic shock s/p Percutaneous insertion of an intra-aortic balloon pump and septic shock/colonic ischemia s/p total abdominal colectomy with ileostomy on 12/7/24,  s/p ex-lap w/ileostomy on 12/9/24. Urology consulted for scrotal edema w/ large skin breakdown. The symptom start one week ago and getting worse recent two days with large area skin breakdown and redness on the whole scrotal area, as well the bilateral groin and internal thigh. Castro in place with clear yellowish colored urine.   In CTU, pt WBC 7, H/H 10/32, Cr 1.29, Lactate 8.7. blood culture on 12/6 growth E. coli, and no growth on 12/7. Patient off pressor currently.     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 Post-op course c/b worsening mental status and acute hypoxic respiratory failure requiring multiple intubations/extubations, currently intubated (as of 12/7/2024) and cardiogenic shock s/p Percutaneous insertion of an intra-aortic balloon pump and septic shock/colonic ischemia s/p total abdominal colectomy with ileostomy on 12/7/24,  s/p ex-lap w/ileostomy on 12/9/24. Urology consulted for scrotal edema w/ large skin breakdown. The symptom start one week ago and getting worse recent two days with large area skin breakdown and redness on the whole scrotal area, as well the bilateral groin and internal thigh. Castro in place with clear yellowish colored urine.   In CTU, pt WBC 7, H/H 10/32, Cr 1.29, Lactate 8.7. blood culture on 12/6 growth E. coli, and no growth on 12/7. Patient off pressor currently.    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 Post-op course c/b worsening mental status and acute hypoxic respiratory failure requiring multiple intubations/extubations, currently intubated (as of 12/7/2024) and cardiogenic shock s/p Percutaneous insertion of an intra-aortic balloon pump and septic shock/colonic ischemia s/p total abdominal colectomy with ileostomy on 12/7/24,  s/p ex-lap w/ileostomy on 12/9/24. Urology consulted for scrotal edema w/ large skin breakdown. The symptom start one week ago and getting worse recent two days with large area skin breakdown and redness on the whole scrotal area, as well the bilateral groin and internal thigh. Castro in place with clear yellowish colored urine.   In CTU, pt WBC 7, H/H 10/32, Cr 1.29, Lactate 8.7. blood culture on 12/6 growth E. coli, and no growth on 12/7. Patient off pressor currently.    Scrotal edema & skin breakdown possibly 2/2 intra-abdominal fluid tracking vs third-spacing from cardiogenic shock    Recommendations:   - No acute urologic intervention indicated   - Scrotal elevation  - Serial examination of scrotum  - Ultrasound scrotum  - Rest of care per primary team    Plan discussed w/ Dr. 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 Post-op course c/b worsening mental status and acute hypoxic respiratory failure requiring multiple intubations/extubations, currently intubated (as of 12/7/2024) and cardiogenic shock s/p Percutaneous insertion of an intra-aortic balloon pump and septic shock/colonic ischemia s/p total abdominal colectomy with ileostomy on 12/7/24,  s/p ex-lap w/ileostomy on 12/9/24. Urology consulted for scrotal edema w/ large skin breakdown. The symptom start one week ago and getting worse recent two days with large area skin breakdown and redness on the whole scrotal area, as well the bilateral groin and internal thigh. Castro in place with clear yellowish colored urine.   In CTU, pt WBC 7, H/H 10/32, Cr 1.29, Lactate 8.7. blood culture on 12/6 growth E. coli, and no growth on 12/7. Patient off pressor currently.    Scrotal edema & skin breakdown possibly 2/2 intra-abdominal fluid tracking vs third-spacing from cardiogenic shock    Recommendations:   - No acute urologic intervention indicated   - Scrotal elevation  - Serial examination of scrotum  - Ultrasound scrotum  - Wound consult   - Rest of care per primary team    Plan discussed w/ Dr. Benito Collier  Urology

## 2024-12-09 NOTE — BRIEF OPERATIVE NOTE - NSICDXBRIEFPREOP_GEN_ALL_CORE_FT
PRE-OP DIAGNOSIS:  Liver cirrhosis 15-Nov-2024 18:05:05  Marisa Arce  
PRE-OP DIAGNOSIS:  Bowel obstruction 09-Dec-2024 15:36:40  Bere Sorto  
PRE-OP DIAGNOSIS:  Cardiogenic shock 07-Dec-2024 08:09:19  Les Dugan  
PRE-OP DIAGNOSIS:  Septic shock 07-Dec-2024 08:49:44  Bere Sorto

## 2024-12-09 NOTE — PROGRESS NOTE ADULT - ASSESSMENT
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 RTOR on 12/7.    [] Septic shock/Cardiogenic shock  [] s/p Colectomy 12/7 POD#2  [] IAPB 12/7: CTICU on board  - bld cx: E.coli, fu rpt bcx 12/6  - started zosyn/linezolid, broadened to jeniffer/linezoid, tobramycin x 1, caspo   - neupogen 480mcg x2  - ID following   - f/u RVP/sputum cx  - f/u CMV PCR, PCP PCR  - trend lactate  - CRRT initiated 12/7    [] POD #24 s/p OLT   - rpt liver US: patent vessels, low RI's in L and R hepatic arteries  - trend LFT's  - Diet: NPO, IVF  - Pain management: avoid narcotics  - Bowel regimen  - Strict I&Os, nichols, wound vac  - US: L brachial DVT   - daily PT     [ ] Immunosuppression  - Tacrolimus stopped 11/18 in setting of AMS/Seizure, Started Cyclo 11/24  - Cyclo held, MMF held today, on stress dose steroids  - PPx: Valcyte HELD due to neutropenia, resumed bactrim    [] lleus   -@ home Linzess  - imaging with distended colon (likely opioid induced)  - s/p Neostigmine x 2  - s/p Relistor, last dose 12/1  - Daily AXR     [] CMV viremia  - CMV  on  11/24  - Repeat CMV PCR 12/2: 34.5  - Valcyte 900mg daily; held 12/6 for neutropenia     [ ] AMS  - Reintubated 11/20 Aspiration/hypoxia, extubated 11/24->[id number]--> extubated 11/26  - EEG 11/30 negative, Neurology following  - BH following   - off tacro  - on keppra    [ ] HTN/ pAFib  - On Amiodarone.   - metoprolol held (hypotensive)   - Eliquis 5mg bid - held 12/6.   - Dr. 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     s/p OLT 11/15 with complicated post op course    [] Septic shock/Cardiogenic shock  [] s/p Colectomy 12/7 POD#2  [] IAPB 12/7: CTICU on board  - E coli Bacteremia (12/6) - on jeniffer/linezoid/ caspo. Received Tobra x 1 12/6    - Neutropenia: received Neupogen 480mcg x2  - MORAIMA: CRRT started 12/7, low u/o  - f/u CMV PCR  - trend lactate  - plan for RTOR today     [] POD #24 s/p OLT   - trend LFT's  - Diet: NPO, IVF  - Pain management: avoid narcotics  - Bowel regimen  - Strict I&Os, nichols, wound vac  - US: L brachial DVT   - daily PT     [ ] Immunosuppression  - Tacrolimus stopped 11/18 in setting of AMS/Seizure, Started Cyclo 11/24  - Cyclo held, MMF held, on stress dose steroids  - PPx: Gancyclovir, bactrim    [] lleus   -@ home on Linzess  - imaging with distended colon (likely opioid induced)  - s/p Neostigmine x 2  - s/p Relistor, last dose 12/1  - s/p colectomy  (see above)  - Daily AXR     [] CMV viremia  - f/u repeat CMV PCR  - currently on Gancyclovir     [ ] AMS  - Reintubated 11/20 Aspiration/hypoxia, extubated 11/24->[id number]/24--> extubated 11/26 -> [id number]  - EEG 11/30 negative, Neurology following  - BH following   - off tacro  - on keppra    [ ] HTN/ pAFib  - On Amiodarone.   - metoprolol held (hypotensive)   - Eliquis 5mg bid - held 12/6.   - Dr. Quintanilla following    [ ] DM  - ISS

## 2024-12-09 NOTE — PROGRESS NOTE ADULT - SUBJECTIVE AND OBJECTIVE BOX
HPI:  Patient seen and examined at bedside in CTU.  IABP remains in place.  Pressor and inotrope support.  Currently sinus rhythm.    Review Of Systems:  Unable to assess while intubated       Medications:  albumin human 25% IVPB 100 milliLiter(s) IV Intermittent every 8 hours  aMIOdarone Infusion 0.5 mG/Min IV Continuous <Continuous>  caspofungin IVPB 50 milliGRAM(s) IV Intermittent every 24 hours  chlorhexidine 0.12% Liquid 15 milliLiter(s) Oral Mucosa every 12 hours  dexMEDEtomidine Infusion 1 MICROgram(s)/kG/Hr IV Continuous <Continuous>  dextrose 50% Injectable 50 milliLiter(s) IV Push every 15 minutes  DOBUTamine Infusion 5 MICROgram(s)/kG/Min IV Continuous <Continuous>  ganciclovir IVPB 120 milliGRAM(s) IV Intermittent every 24 hours  hydrocortisone sodium succinate Injectable 50 milliGRAM(s) IV Push every 8 hours  insulin regular Infusion 1 Unit(s)/Hr IV Continuous <Continuous>  linezolid  IVPB 600 milliGRAM(s) IV Intermittent every 12 hours  meropenem  IVPB 1000 milliGRAM(s) IV Intermittent every 8 hours  norepinephrine Infusion 0.01 MICROgram(s)/kG/Min IV Continuous <Continuous>  pantoprazole  Injectable 40 milliGRAM(s) IV Push every 12 hours  trimethoprim / sulfamethoxazole IVPB 160 milliGRAM(s) IV Intermittent daily  vasopressin Infusion 0.01 Unit(s)/Min IV Continuous <Continuous>    PAST MEDICAL & SURGICAL HISTORY:  Diabetes      Transaminitis      Paroxysmal atrial fibrillation      Depression      BPH (benign prostatic hyperplasia)      Hypertension      Chronic atrial fibrillation      Coronary artery disease      Hepatocellular carcinoma      DM (diabetes mellitus)      HTN (hypertension)      Paroxysmal atrial fibrillation      Cirrhosis      HCC (hepatocellular carcinoma)      History of BPH      History of laparoscopic cholecystectomy      History of lumbar laminectomy      H/O prior ablation treatment      H/O percutaneous left heart catheterization        Vitals:  T(C): 36.6 (12-10-24 @ 00:00), Max: 37.5 (24 @ 14:30)  HR: 84 (12-10-24 @ 00:00) (69 - 104)  BP: 128/38 (24 @ 10:11) (128/38 - 128/38)  BP(mean): 60 (24 @ 10:11) (60 - 60)  RR: 19 (12-10-24 @ 00:00) (17 - 25)  SpO2: 99% (12-10-24 @ 00:00) (98% - 100%)  Wt(kg): --  Daily Height in cm: 182.88 (09 Dec 2024 10:11)    Daily Weight in k (10 Dec 2024 00:00)  I&O's Summary    08 Dec 2024 07:01  -  09 Dec 2024 07:00  --------------------------------------------------------  IN: 5458.3 mL / OUT: 3963 mL / NET: 1495.3 mL    09 Dec 2024 07:01  -  10 Dec 2024 00:38  --------------------------------------------------------  IN: 2080.6 mL / OUT: 2235 mL / NET: -154.4 mL        Physical Exam:  Appearance: Intubated, sedated  Eyes: PERRL, EOMI, pink conjunctiva  HENT: +ET tube  Cardiovascular: RRR, +IABP   Respiratory: ventilatory support  Gastrointestinal: soft, non-tender, non-distended with normal bowel sounds  Musculoskeletal: No clubbing; no joint deformity                             10.6   7.25  )-----------( 37       ( 09 Dec 2024 20:11 )             32.1         140  |  103  |  36[H]  ----------------------------<  175[H]  4.3   |  17[L]  |  1.29    Ca    8.1[L]      09 Dec 2024 20:11  Phos  3.0       Mg     2.3         TPro  3.5[L]  /  Alb  2.6[L]  /  TBili  6.0[H]  /  DBili  x   /  AST  120[H]  /  ALT  331[H]  /  AlkPhos  73  12    PT/INR - ( 09 Dec 2024 20:11 )   PT: 20.5 sec;   INR: 1.79 ratio         PTT - ( 09 Dec 2024 20:11 )  PTT:45.7 sec      Cardiovascular Diagnostic Testing:  ECG: sinus rhythm    Echo: < from: Intra-Operative Transesophageal Echo W or WO Ultrasound Enhancing Agent (11.15.24 @ 07:46) >  --------------------------------------------------------------------------------  PRE-BYPASS FINDINGS     Left Ventricle:  Left ventricular ejection fraction is estimated at 60 to 65%. Normal left ventricularwall thickness. The left ventricular systolic function is normal There are no regional wall motion abnormalities seen.     Right Ventricle:  The right ventricular cavity is normal in size, with normal wall thickness and right ventricular systolic function is normal. Right ventricular systolic function is normal.     Left Atrium:  The left atrium is normal in size. No thrombus visualized in left atrial appendage.     Right Atrium:  The right atrium is normal in size. The right atrium is normal in size.     Interatrial Septum:  No PFO visualized with color flow doppler.     Aortic Valve:  The aortic valve appears trileaflet. There is no aortic valve stenosis. There is trace aortic regurgitation.     Mitral Valve:  There is no mitral valve stenosis. There is no mitral valve stenosis. There is trace mitral regurgitation.     Tricuspid Valve:  There is no evidence of tricuspid stenosis. There is trace tricuspid regurgitation.     Pericardium:  No pericardial effusion seen.     Post-Bypass:  S/P OLT. Under current loading conditions, hyperdynamic left ventricular systolic function, EF: 70-75% by visual estimate, no RWMA. Normal right ventricular systolic function. All other findings unchanged. Probe removed atraumatically, no blood. The patient tolerated the procedure well and without complications. Permanent recorded images are stored in the medical record.     Electronically signed by James Villareal on 11/15/2024 at 2:18:16 PM         *** Final ***    < end of copied text >      Stress Testing:     Cath: 2024, patient had his LHC repeated with Ben Villareal MD at [facility].  Cath showed multivessel coronary artery disease, but the lesions previously noted were FFR negative.  He continues to have MIRTA 3 flow throughout.    Interpretation of Telemetry: Sinus     Imaging:

## 2024-12-09 NOTE — PRE-ANESTHESIA EVALUATION ADULT - NSANTHPMHFT_GEN_ALL_CORE
Medical record reviewed in full  and discussed with surgeon and care team.
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     s/p OLT 11/15 with complicated post op course including Septic shock/Cardiogenic shock s/p Colectomy 12 POD#2 and placement of IAPB .  c/b MORAIMA on CRRT.   on  5, Vaso 0.3, NO 5 ppm
pt s/p liver tx in ICU with increasing lactate, increasing pressor requirement, and now with positive BC.  Plan per primary service to return to OR for ex. lap and indicated procedures.

## 2024-12-09 NOTE — PROGRESS NOTE ADULT - SUBJECTIVE AND OBJECTIVE BOX
Patient seen and examined at the bedside.    Remains critically ill on continuous ICU monitoring.      Brief Summary:  75 yo M s/p recent liver transplant now with Cardiogenic and Septic shock s/p IABP insertion, total abdominal colectomy on 12/7/2024  Abdomen remains open and bowel is in discontinuity.      24 Hour events:  Received 1 PRBC, 1 FFP, and 4 Plts yesterday    Objective:  Vital Signs Last 24 Hrs  T(C): 36.5 (09 Dec 2024 04:00), Max: 36.5 (08 Dec 2024 12:00)  T(F): 97.7 (09 Dec 2024 04:00), Max: 97.7 (08 Dec 2024 12:00)  HR: 104 (09 Dec 2024 07:00) (71 - 113)  BP: --  BP(mean): --  RR: 18 (09 Dec 2024 07:00) (18 - 24)  SpO2: 100% (09 Dec 2024 07:00) (94% - 100%)    Parameters below as of 09 Dec 2024 04:00  Patient On (Oxygen Delivery Method): ventilator  O2 Flow (L/min): 40      Mode: AC/ CMV (Assist Control/ Continuous Mandatory Ventilation)  RR (machine): 18  TV (machine): 500  FiO2: 40  PEEP: 5  ITime: 1  MAP: 8  PIP: 15              Physical Exam:   General: Intubated  Neurology: Sedated but follows commands when sedation is off.  Respiratory: Bilateral breath sounds  CV: Afib   Abdominal: Soft, Nontender  Extremities: Warm, well-perfused  Castro  -------------------------------------------------------------------------------------------------------------------------------    Labs:                        10.6   5.16  )-----------( 29       ( 09 Dec 2024 04:03 )             32.3     12-09    139  |  105  |  35[H]  ----------------------------<  129[H]  4.3   |  16[L]  |  1.29    Ca    7.8[L]      09 Dec 2024 04:03  Phos  2.8     12-09  Mg     2.2     12-09    TPro  3.2[L]  /  Alb  2.3[L]  /  TBili  4.4[H]  /  DBili  x   /  AST  331[H]  /  ALT  667[H]  /  AlkPhos  67  12-09    LIVER FUNCTIONS - ( 09 Dec 2024 04:03 )  Alb: 2.3 g/dL / Pro: 3.2 g/dL / ALK PHOS: 67 U/L / ALT: 667 U/L / AST: 331 U/L / GGT: x           PT/INR - ( 09 Dec 2024 04:03 )   PT: 27.1 sec;   INR: 2.40 ratio         PTT - ( 09 Dec 2024 04:03 )  PTT:56.0 sec  ABG - ( 09 Dec 2024 06:11 )  pH, Arterial: 7.38  pH, Blood: x     /  pCO2: 33    /  pO2: 174   / HCO3: 20    / Base Excess: -4.9  /  SaO2: 100.0       ------------------------------------------------------------------------------------------------------------------------------  Assessment:  75 yo M with cardiogenic shock s/p IABP insertion, total abdominal colectomy with ileostomy on 12/7/2024.     Cardiogenic shock  Hypovolemia  Lactic acidosis  Acute respiratory failure  Acute liver injury, transaminitis, hyperbilirubinemia  Acute kidney injury  Hypokalemia  Hypocalcemia  Acute blood loss anemia  Thrombocytopenia  Leukopenia  E coli bacteremia      Plan:  ***Neuro***  Sedated with Precedex for comfort/vent synchrony     ***Cardiovascular***  At high risk for ongoing hemodynamic instability and cardiac arrhythmias.  Remains on Dobutamine - trend cardiac index and mixed venous saturation.  Trend Lactate  R. Fem IABP augmenting 1:1  A fib - aggressive repletion of electrolytes.  Wean Jacqui to off  Wean pressors for MAP > 65 mm Hg.  Albumin / IVF/ FFP for hypovolemia - monitor VAC outputs.    ***Pulmonary***  Postoperative acute respiratory failure  Wean ventilator as tolerated.   Deep breathing and coughing exercises.  Wean oxygen as able.    ***GI***  s/p liver transplant with acute liver injury from sepsis - trend LFTs.  s/p total abdominal colectomy  NPO for now.  Remains in discontinuity.  VAC with high output but thin drainage - ongoing resuscitation to keep I/O close to even.  Protonix for stress ulcer prophylaxis.     ***Renal***  MORAIMA - on CRRT.  Castro catheter for strict I/O measurements.  Hypokalemia repleted.  Hypocalcemia - Calcium infusion, trend levels.    ***ID***  Merrem for e coli bacteremia  Sepsis coverage with Caspofungin and Linezolid   CMV prophylaxis with Ganciclovir  Trend leukopenia s/p Filgrastin.  Bactrim for PJP prophiolaxis    ***Endocrine***  Hyperglycemia     ***Hematology***  Acute blood loss anemia and thrombocytopenia.  Trend CBC and monitor for bleeding.  Transfused 1 PRBC, 1 FFP, and 4 Plts yesterday        Patient requires continuous monitoring with bedside rhythm monitoring, pulse oximetry monitoring, and continuous central venous and arterial pressure monitoring; and intermittent blood gas analysis. Care plan discussed with the ICU care team.   Patient remains critical, at risk for life threatening decompensation.    I have spent 30 minutes providing critical care management to this patient.    By signing my name below, I, Shon Pierce, attest that this documentation has been prepared under the direction and in the presence of Eloisa Wesley MD  Electronically signed: Shon Pierce, 12-09-24 @ 07:01    IEloisa MD, personally performed the services described in this documentation. all medical record entries made by the scribe were at my direction and in my presence. I have reviewed the chart and agree that the record reflects my personal performance and is accurate and complete  Electronically signed: Eloisa Wesley MD Patient seen and examined at the bedside.    Remains critically ill on continuous ICU monitoring.      Brief Summary:  73 yo M s/p recent liver transplant now with Cardiogenic and Septic shock s/p IABP insertion, total abdominal colectomy on 12/7/2024  Abdomen remains open and bowel is in discontinuity.      24 Hour events:  Lactate plateaued in 8s.  Dobutamine weaned to 3 yesterday, increased back to 5 overnight.  Platelet count remains low.  Plan for OR today for abdominal re-exploration.      Objective:  Vital Signs Last 24 Hrs  T(C): 36.5 (09 Dec 2024 04:00), Max: 36.5 (08 Dec 2024 12:00)  T(F): 97.7 (09 Dec 2024 04:00), Max: 97.7 (08 Dec 2024 12:00)  HR: 104 (09 Dec 2024 07:00) (71 - 113)  BP: --  BP(mean): --  RR: 18 (09 Dec 2024 07:00) (18 - 24)  SpO2: 100% (09 Dec 2024 07:00) (94% - 100%)    Parameters below as of 09 Dec 2024 04:00  Patient On (Oxygen Delivery Method): ventilator  O2 Flow (L/min): 40      Mode: AC/ CMV (Assist Control/ Continuous Mandatory Ventilation)  RR (machine): 18  TV (machine): 500  FiO2: 40  PEEP: 5  ITime: 1  MAP: 8  PIP: 15              Physical Exam:   General: Intubated  Neurology: Sedated but follows commands when sedation is off.  Respiratory: Bilateral breath sounds  CV: Afib   Abdominal: Soft, Nontender  Extremities: Warm, well-perfused  Castro  -------------------------------------------------------------------------------------------------------------------------------    Labs:                        10.6   5.16  )-----------( 29       ( 09 Dec 2024 04:03 )             32.3     12-09    139  |  105  |  35[H]  ----------------------------<  129[H]  4.3   |  16[L]  |  1.29    Ca    7.8[L]      09 Dec 2024 04:03  Phos  2.8     12-09  Mg     2.2     12-09    TPro  3.2[L]  /  Alb  2.3[L]  /  TBili  4.4[H]  /  DBili  x   /  AST  331[H]  /  ALT  667[H]  /  AlkPhos  67  12-09    LIVER FUNCTIONS - ( 09 Dec 2024 04:03 )  Alb: 2.3 g/dL / Pro: 3.2 g/dL / ALK PHOS: 67 U/L / ALT: 667 U/L / AST: 331 U/L / GGT: x           PT/INR - ( 09 Dec 2024 04:03 )   PT: 27.1 sec;   INR: 2.40 ratio         PTT - ( 09 Dec 2024 04:03 )  PTT:56.0 sec  ABG - ( 09 Dec 2024 06:11 )  pH, Arterial: 7.38  pH, Blood: x     /  pCO2: 33    /  pO2: 174   / HCO3: 20    / Base Excess: -4.9  /  SaO2: 100.0       ------------------------------------------------------------------------------------------------------------------------------  Assessment:  73 yo M with cardiogenic shock s/p IABP insertion, total abdominal colectomy with ileostomy on 12/7/2024.     Cardiogenic shock  Hypovolemia  Lactic acidosis  Acute respiratory failure  Acute liver injury, transaminitis, hyperbilirubinemia  Acute kidney injury  Hypokalemia  Hypocalcemia  Acute blood loss anemia  Thrombocytopenia  E coli bacteremia      Plan:  ***Neuro***  Sedated with Precedex for comfort/vent synchrony     ***Cardiovascular***  At high risk for ongoing hemodynamic instability and cardiac arrhythmias.  Remains on Dobutamine - trend cardiac index and mixed venous saturation.  Trend Lactate  R. Fem IABP augmenting 1:1  AYDEN in OR today.  A fib - aggressive repletion of electrolytes, on Amiodarone.  Wean Jacqui to off  Wean pressors for MAP > 65 mm Hg.  Albumin / IVF/ FFP for hypovolemia - monitor VAC outputs.    ***Pulmonary***  Postoperative acute respiratory failure  Remains on vent support.  May benefit from early tracheostomy    ***GI***  s/p liver transplant with acute liver injury from sepsis - trend LFTs.  s/p total abdominal colectomy  NPO for now.  Remains in discontinuity.  VAC with high output but thin drainage - ongoing resuscitation to keep I/O close to even.  Return to OR today for re-exploration, possible stoma, possible closure.  Protonix for stress ulcer prophylaxis.     ***Renal***  MORAIMA - on CRRT.  Volume removal after OR, will supplement with concentrated Albumin.   Hypokalemia repleted.  Hypocalcemia - Calcium infusion, trend levels, may be able to stop after OR.    ***ID***  Merrem for e coli bacteremia - will discuss possible narrowing with ID.  Sepsis coverage with Caspofungin and Linezolid   CMV prophylaxis with Ganciclovir  Trend leukopenia s/p Filgrastin.  Bactrim for prophylaxis.    ***Endocrine***  Monitor blood sugars, Insulin if needed.    ***Hematology***  Acute blood loss anemia and thrombocytopenia.  Trend CBC and monitor for bleeding.  Transfusing platelets this morning prior to OR.        Patient requires continuous monitoring with bedside rhythm monitoring, pulse oximetry monitoring, and continuous central venous and arterial pressure monitoring; and intermittent blood gas analysis. Care plan discussed with the ICU care team.   Patient remains critical, at risk for life threatening decompensation.    I have spent 55 minutes providing critical care management to this patient.    By signing my name below, I, Shon Pierce, attest that this documentation has been prepared under the direction and in the presence of Eloisa Wesley MD  Electronically signed: Shon Pierce, 12-09-24 @ 07:01    Eloisa REY MD, personally performed the services described in this documentation. all medical record entries made by the scribe were at my direction and in my presence. I have reviewed the chart and agree that the record reflects my personal performance and is accurate and complete  Electronically signed: Eloisa Wesley MD

## 2024-12-09 NOTE — PRE-ANESTHESIA EVALUATION ADULT - NSANTHADDINFOFT_GEN_ALL_CORE
combined Cardiac and liver anesthesia will be present for case.  AYDEN requested by Surgeon
r/b/a discussed with the family, all questions answered, they wish to proceed

## 2024-12-09 NOTE — CONSULT NOTE ADULT - SUBJECTIVE AND OBJECTIVE BOX
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 Post-op course c/b worsening mental status and acute hypoxic respiratory failure requiring multiple intubations/extubations, currently intubated (as of 12/7/2024) and cardiogenic shock s/p Percutaneous insertion of an intra-aortic balloon pump and septic shock/colonic ischemia s/p total abdominal colectomy with ileostomy on 12/7/24. Urology consulted for scrotal edema w/ large skin breakdown.   Patient seen and examined in CTU with family members present at bedside. Patient somnolent and intubated. Per family member, patient has scrotal swelling since one week ago, they elevated his scrotum by using towel. However, the symptom is getting worse recent two days with large area skin breakdown and redness on the whole scrotal area, as well the bilateral groin and internal thigh. Castro in place with clear yellowish colored urine.     PAST MEDICAL & SURGICAL HISTORY:  Diabetes      Transaminitis      Paroxysmal atrial fibrillation      Depression      BPH (benign prostatic hyperplasia)      Hypertension      Chronic atrial fibrillation      Coronary artery disease      Hepatocellular carcinoma      DM (diabetes mellitus)      HTN (hypertension)      Paroxysmal atrial fibrillation      Cirrhosis      HCC (hepatocellular carcinoma)      History of BPH      History of laparoscopic cholecystectomy      History of lumbar laminectomy      H/O prior ablation treatment      H/O percutaneous left heart catheterization          FAMILY HISTORY:  Family history of coronary artery disease (Father, Sibling, Sibling)    Family history of diabetes mellitus (Father, Mother)    Family history of coronary artery disease (Sibling)    No known  malignancy     Denies alcohol and drug abuse, nonsmoker     MEDICATIONS  (STANDING):  albumin human 25% IVPB 100 milliLiter(s) IV Intermittent every 8 hours  aMIOdarone Infusion 0.5 mG/Min (16.7 mL/Hr) IV Continuous <Continuous>  chlorhexidine 0.12% Liquid 15 milliLiter(s) Oral Mucosa every 12 hours  dexMEDEtomidine Infusion 1 MICROgram(s)/kG/Hr (23.4 mL/Hr) IV Continuous <Continuous>  dextrose 50% Injectable 50 milliLiter(s) IV Push every 15 minutes  DOBUTamine Infusion 5 MICROgram(s)/kG/Min (14 mL/Hr) IV Continuous <Continuous>  ganciclovir IVPB 120 milliGRAM(s) IV Intermittent every 24 hours  hydrocortisone sodium succinate Injectable 50 milliGRAM(s) IV Push every 8 hours  insulin regular Infusion 1 Unit(s)/Hr (1 mL/Hr) IV Continuous <Continuous>  linezolid  IVPB 600 milliGRAM(s) IV Intermittent every 12 hours  meropenem  IVPB 1000 milliGRAM(s) IV Intermittent every 8 hours  norepinephrine Infusion 0.01 MICROgram(s)/kG/Min (1.76 mL/Hr) IV Continuous <Continuous>  pantoprazole  Injectable 40 milliGRAM(s) IV Push every 12 hours  trimethoprim / sulfamethoxazole IVPB 160 milliGRAM(s) IV Intermittent daily  vasopressin Infusion 0.01 Unit(s)/Min (1.5 mL/Hr) IV Continuous <Continuous>    MEDICATIONS  (PRN):    Allergies    No Known Allergies    Intolerances      REVIEW OF SYSTEMS: Pertinent positives and negatives as stated in HPI, otherwise negative    Vital signs  T(C): 37 (12-09-24 @ 16:00), Max: 37.5 (12-09-24 @ 14:30)  HR: 88 (12-09-24 @ 19:30)  BP: 128/38 (12-09-24 @ 10:11)  SpO2: 99% (12-09-24 @ 19:30)  Wt(kg): --    Physical Exam  Gen: illness appare  Abd: Soft, NT, ND, no rebound tenderness or guarding  : Castro in place with clear yellow colored urine. No discharge or blood at urethral meatus. Swollen scrotum, redness, skin breakdown noted whole scrotum, bilateral groin, bilateral internal thigh. Hard to palpation, no induration, No crepitus felt.      LABS:  CBC                       10.6   7.25  )-----------( 37       ( 09 Dec 2024 20:11 )             32.1     BMP   12-09    142  |  103  |  35[H]  ----------------------------<  129[H]  4.4   |  16[L]  |  1.44[H]    Ca    9.1      09 Dec 2024 15:05  Phos  2.9     12-09  Mg     2.4     12-09    TPro  3.8[L]  /  Alb  2.8[L]  /  TBili  4.7[H]  /  DBili  x   /  AST  198[H]  /  ALT  387[H]  /  AlkPhos  70  12-09    PT/INR - ( 09 Dec 2024 20:11 )   PT: 20.5 sec;   INR: 1.79 ratio         PTT - ( 09 Dec 2024 20:11 )  PTT:45.7 sec    Urinalysis Basic - ( 09 Dec 2024 15:05 )    Color: x / Appearance: x / SG: x / pH: x  Gluc: 129 mg/dL / Ketone: x  / Bili: x / Urobili: x   Blood: x / Protein: x / Nitrite: x   Leuk Esterase: x / RBC: x / WBC x   Sq Epi: x / Non Sq Epi: x / Bacteria: x      Urine Cx:   Blood Cx:    Radiology: HPI:  74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 Post-op course c/b worsening mental status and acute hypoxic respiratory failure requiring multiple intubations/extubations, currently intubated (as of 12/7/2024) and cardiogenic shock s/p Percutaneous insertion of an intra-aortic balloon pump and septic shock/colonic ischemia s/p total abdominal colectomy with ileostomy on 12/7/24, s/p ex-lap w/ileostomy on 12/9/24. Urology consulted for scrotal edema w/ large skin breakdown.   Patient seen and examined in CTU with family members present at bedside. Patient somnolent and intubated. Per family member, patient has scrotal swelling since one week ago, they elevated his scrotum by using towel. However, the symptom is getting worse recent two days with large area skin breakdown and redness on the whole scrotal area, as well the bilateral groin and internal thigh. Castro in place with clear yellowish colored urine.     PAST MEDICAL & SURGICAL HISTORY:  Diabetes      Transaminitis      Paroxysmal atrial fibrillation      Depression      BPH (benign prostatic hyperplasia)      Hypertension      Chronic atrial fibrillation      Coronary artery disease      Hepatocellular carcinoma      DM (diabetes mellitus)      HTN (hypertension)      Paroxysmal atrial fibrillation      Cirrhosis      HCC (hepatocellular carcinoma)      History of BPH      History of laparoscopic cholecystectomy      History of lumbar laminectomy      H/O prior ablation treatment      H/O percutaneous left heart catheterization          FAMILY HISTORY:  Family history of coronary artery disease (Father, Sibling, Sibling)    Family history of diabetes mellitus (Father, Mother)    Family history of coronary artery disease (Sibling)    No known  malignancy     Denies alcohol and drug abuse, nonsmoker     MEDICATIONS  (STANDING):  albumin human 25% IVPB 100 milliLiter(s) IV Intermittent every 8 hours  aMIOdarone Infusion 0.5 mG/Min (16.7 mL/Hr) IV Continuous <Continuous>  chlorhexidine 0.12% Liquid 15 milliLiter(s) Oral Mucosa every 12 hours  dexMEDEtomidine Infusion 1 MICROgram(s)/kG/Hr (23.4 mL/Hr) IV Continuous <Continuous>  dextrose 50% Injectable 50 milliLiter(s) IV Push every 15 minutes  DOBUTamine Infusion 5 MICROgram(s)/kG/Min (14 mL/Hr) IV Continuous <Continuous>  ganciclovir IVPB 120 milliGRAM(s) IV Intermittent every 24 hours  hydrocortisone sodium succinate Injectable 50 milliGRAM(s) IV Push every 8 hours  insulin regular Infusion 1 Unit(s)/Hr (1 mL/Hr) IV Continuous <Continuous>  linezolid  IVPB 600 milliGRAM(s) IV Intermittent every 12 hours  meropenem  IVPB 1000 milliGRAM(s) IV Intermittent every 8 hours  norepinephrine Infusion 0.01 MICROgram(s)/kG/Min (1.76 mL/Hr) IV Continuous <Continuous>  pantoprazole  Injectable 40 milliGRAM(s) IV Push every 12 hours  trimethoprim / sulfamethoxazole IVPB 160 milliGRAM(s) IV Intermittent daily  vasopressin Infusion 0.01 Unit(s)/Min (1.5 mL/Hr) IV Continuous <Continuous>    MEDICATIONS  (PRN):    Allergies    No Known Allergies    Intolerances      REVIEW OF SYSTEMS: Pertinent positives and negatives as stated in HPI, otherwise negative    Vital signs  T(C): 37 (12-09-24 @ 16:00), Max: 37.5 (12-09-24 @ 14:30)  HR: 88 (12-09-24 @ 19:30)  BP: 128/38 (12-09-24 @ 10:11)  SpO2: 99% (12-09-24 @ 19:30)  Wt(kg): --    Physical Exam  Gen: illness appare  Abd: Soft, NT, ND, no rebound tenderness or guarding  : Castro in place with clear yellow colored urine. No discharge or blood at urethral meatus. Swollen scrotum, redness, skin breakdown noted whole scrotum, bilateral groin, bilateral internal thigh. Hard to palpation, no induration, No crepitus felt.      LABS:  CBC                       10.6   7.25  )-----------( 37       ( 09 Dec 2024 20:11 )             32.1     BMP   12-09    142  |  103  |  35[H]  ----------------------------<  129[H]  4.4   |  16[L]  |  1.44[H]    Ca    9.1      09 Dec 2024 15:05  Phos  2.9     12-09  Mg     2.4     12-09    TPro  3.8[L]  /  Alb  2.8[L]  /  TBili  4.7[H]  /  DBili  x   /  AST  198[H]  /  ALT  387[H]  /  AlkPhos  70  12-09    PT/INR - ( 09 Dec 2024 20:11 )   PT: 20.5 sec;   INR: 1.79 ratio         PTT - ( 09 Dec 2024 20:11 )  PTT:45.7 sec    Urinalysis Basic - ( 09 Dec 2024 15:05 )    Color: x / Appearance: x / SG: x / pH: x  Gluc: 129 mg/dL / Ketone: x  / Bili: x / Urobili: x   Blood: x / Protein: x / Nitrite: x   Leuk Esterase: x / RBC: x / WBC x   Sq Epi: x / Non Sq Epi: x / Bacteria: x    Blood Cx:  Culture - Blood (12.07.24 @ 15:30)   Specimen Source: .Blood BLOOD  Culture Results:   No growth at 48 Hours      Radiology:

## 2024-12-09 NOTE — PROGRESS NOTE ADULT - SUBJECTIVE AND OBJECTIVE BOX
Transplant Surgery - Multidisciplinary Progress Note  --------------------------------------------------------------   Donor OLTx      Date: 11/15/24        POD#39    Present:   Patient seen and examined POD 0 s/p exploratory laparotomy with washout and ileostomy creation and closure of abdominal fascia     Stable on dobutamine and vasopressin      Education:  Medications    Plan of care:  See Below    MEDICATIONS  (STANDING):  albumin human 25% IVPB 100 milliLiter(s) IV Intermittent every 8 hours  aMIOdarone Infusion 0.5 mG/Min (16.7 mL/Hr) IV Continuous <Continuous>  chlorhexidine 0.12% Liquid 15 milliLiter(s) Oral Mucosa every 12 hours  dexMEDEtomidine Infusion 1 MICROgram(s)/kG/Hr (23.4 mL/Hr) IV Continuous <Continuous>  dextrose 50% Injectable 50 milliLiter(s) IV Push every 15 minutes  DOBUTamine Infusion 5 MICROgram(s)/kG/Min (14 mL/Hr) IV Continuous <Continuous>  ganciclovir IVPB 120 milliGRAM(s) IV Intermittent every 24 hours  hydrocortisone sodium succinate Injectable 50 milliGRAM(s) IV Push every 8 hours  insulin regular Infusion 1 Unit(s)/Hr (1 mL/Hr) IV Continuous <Continuous>  linezolid  IVPB 600 milliGRAM(s) IV Intermittent every 12 hours  meropenem  IVPB 1000 milliGRAM(s) IV Intermittent every 8 hours  norepinephrine Infusion 0.01 MICROgram(s)/kG/Min (1.76 mL/Hr) IV Continuous <Continuous>  pantoprazole  Injectable 40 milliGRAM(s) IV Push every 12 hours  trimethoprim / sulfamethoxazole IVPB 160 milliGRAM(s) IV Intermittent daily  vasopressin Infusion 0.01 Unit(s)/Min (1.5 mL/Hr) IV Continuous <Continuous>    MEDICATIONS  (PRN):      PAST MEDICAL & SURGICAL HISTORY:  Diabetes      Transaminitis      Paroxysmal atrial fibrillation      Depression      BPH (benign prostatic hyperplasia)      Hypertension      Chronic atrial fibrillation      Coronary artery disease      Hepatocellular carcinoma      DM (diabetes mellitus)      HTN (hypertension)      Paroxysmal atrial fibrillation      Cirrhosis      HCC (hepatocellular carcinoma)      History of BPH      History of laparoscopic cholecystectomy      History of lumbar laminectomy      H/O prior ablation treatment      H/O percutaneous left heart catheterization          Vital Signs Last 24 Hrs  T(C): 37 (09 Dec 2024 16:00), Max: 37.5 (09 Dec 2024 14:30)  T(F): 98.6 (09 Dec 2024 16:00), Max: 99.5 (09 Dec 2024 14:30)  HR: 84 (09 Dec 2024 17:15) (69 - 113)  BP: 128/38 (09 Dec 2024 10:11) (128/38 - 128/38)  BP(mean): 60 (09 Dec 2024 10:11) (60 - 60)  RR: 19 (09 Dec 2024 17:15) (17 - 24)  SpO2: 100% (09 Dec 2024 17:15) (94% - 100%)    Parameters below as of 09 Dec 2024 16:00  Patient On (Oxygen Delivery Method): ventilator    O2 Concentration (%): 40    I&O's Summary    08 Dec 2024 07:01  -  09 Dec 2024 07:00  --------------------------------------------------------  IN: 5458.3 mL / OUT: 3963 mL / NET: 1495.3 mL    09 Dec 2024 07:01  -  09 Dec 2024 17:50  --------------------------------------------------------  IN: 1303.2 mL / OUT: 935 mL / NET: 368.2 mL                              10.3   4.83  )-----------( 50       ( 09 Dec 2024 15:05 )             31.7     12-    142  |  103  |  35[H]  ----------------------------<  129[H]  4.4   |  16[L]  |  1.44[H]    Ca    9.1      09 Dec 2024 15:05  Phos  2.9     12-  Mg     2.4     12-    TPro  3.8[L]  /  Alb  2.8[L]  /  TBili  4.7[H]  /  DBili  x   /  AST  198[H]  /  ALT  387[H]  /  AlkPhos  70          Review of systems  Unable to perform due to mental status       PHYSICAL EXAM:  Constitutional: frail  Eyes: Anicteric, PERRLA  ENMT: nc/at, no thrush  Neck: supple, central lines in place  Respiratory: CTA B/L  Cardiovascular: RRR  Gastrointestinal: Soft abdomen, nondistended, JPs x 2 both with serous drainage  : nichols in place, oliguric  Extremities: SCD's in place and working bilaterally  Vascular: Palpable dp pulses bilaterally.   Neurological: sedated  Skin: excoriations present over scrotum  Musculoskeletal: warm extremities  Psychiatric: sedated

## 2024-12-09 NOTE — PRE-ANESTHESIA EVALUATION ADULT - NSANTHAIRWAYFT_ENT_ALL_CORE
intubated and sedated, ETT appears to be in proper position, remainder of dental exam deferred
Intubated
secondary impairments
Examined

## 2024-12-09 NOTE — PRE-ANESTHESIA EVALUATION ADULT - NSANTHPROCED_GEN_ALL_CORE
all lines in situ/Transesophageal Echocardiogram
Arterial Catheter/Central Venous Catheter/Transesophageal Echocardiogram
Arterial Catheter/Central Venous Catheter/Pulmonary Artery Catheter/Transesophageal Echocardiogram

## 2024-12-09 NOTE — CHART NOTE - NSCHARTNOTEFT_GEN_A_CORE
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 Post-op course c/b worsening mental status and multiple intubations. S/P FEES on 12/3-> recommendations included minced and moist with moderately thick liquids. Pt with change in status on 12/7 -> Cardiogenic and Septic shock s/p IABP insertion, total abdominal colectomy on 12/7/2024; Abdomen remains open and bowel is in discontinuity.    Pt remains intubated in CTU at this time. SLP to sign off - please reconsult once extubated given history of dysphagia.    Tiffanie Goodwin MA, CCC-SLP  Speech Language Pathologist  available on TEAMS or [phone]

## 2024-12-09 NOTE — PROGRESS NOTE ADULT - NS ATTEND AMEND GEN_ALL_CORE FT
remains intubated on pressors with aortic balloon pump  plan for OR today for second look and possible closure and ileostomy  will plan to place either NJ tube vs G-J feeding tube in OR  Immunosuppression - Prograf and cellcept held. Cont stress dose hydrocortisone

## 2024-12-09 NOTE — PROGRESS NOTE ADULT - SUBJECTIVE AND OBJECTIVE BOX
Follow Up:      Interval History:    REVIEW OF SYSTEMS  [  ] ROS unobtainable because:    [  ] All other systems negative except as noted below    Constitutional:  [ ] fever [ ] chills  [ ] weight loss  [ ] weakness  Skin:  [ ] rash [ ] phlebitis	  Eyes: [ ] icterus [ ] pain  [ ] discharge	  ENMT: [ ] sore throat  [ ] thrush [ ] ulcers [ ] exudates  Respiratory: [ ] dyspnea [ ] hemoptysis [ ] cough [ ] sputum	  Cardiovascular:  [ ] chest pain [ ] palpitations [ ] edema	  Gastrointestinal:  [ ] nausea [ ] vomiting [ ] diarrhea [ ] constipation [ ] pain	  Genitourinary:  [ ] dysuria [ ] frequency [ ] hematuria [ ] discharge [ ] flank pain  [ ] incontinence  Musculoskeletal:  [ ] myalgias [ ] arthralgias [ ] arthritis  [ ] back pain  Neurological:  [ ] headache [ ] seizures  [ ] confusion/altered mental status    Allergies  No Known Allergies        ANTIMICROBIALS:  ganciclovir IVPB 120 every 24 hours  linezolid  IVPB 600 every 12 hours  meropenem  IVPB 1000 every 8 hours  trimethoprim / sulfamethoxazole IVPB 160 daily      OTHER MEDS:  MEDICATIONS  (STANDING):  aMIOdarone Infusion 0.5 <Continuous>  dexMEDEtomidine Infusion 1 <Continuous>  dextrose 50% Injectable 50 every 15 minutes  DOBUTamine Infusion 5 <Continuous>  hydrocortisone sodium succinate Injectable 50 every 8 hours  insulin regular Infusion 1 <Continuous>  norepinephrine Infusion 0.01 <Continuous>  pantoprazole  Injectable 40 every 12 hours  vasopressin Infusion 0.01 <Continuous>      Vital Signs Last 24 Hrs  T(C): 37.5 (09 Dec 2024 14:30), Max: 37.5 (09 Dec 2024 14:30)  T(F): 99.5 (09 Dec 2024 14:30), Max: 99.5 (09 Dec 2024 14:30)  HR: 85 (09 Dec 2024 15:15) (69 - 113)  BP: 128/38 (09 Dec 2024 10:11) (128/38 - 128/38)  BP(mean): 60 (09 Dec 2024 10:11) (60 - 60)  RR: 18 (09 Dec 2024 15:15) (17 - 24)  SpO2: 100% (09 Dec 2024 15:15) (94% - 100%)    Parameters below as of 09 Dec 2024 14:30  Patient On (Oxygen Delivery Method): ventilator    O2 Concentration (%): 40    PHYSICAL EXAMINATION:  General: Alert and Awake, NAD  HEENT: PERRL, EOMI  Neck: Supple  Cardiac: RRR, No M/R/G  Resp: CTAB, No Wh/Rh/Ra  Abdomen: NBS, NT/ND, No HSM, No rigidity or guarding  MSK: No LE edema. No Calf tenderness  : No nichols  Skin: No rashes or lesions. Skin is warm and dry to the touch.   Neuro: Alert and Awake. CN 2-12 Grossly intact. Moves all four extremities spontaneously.  Psych: Calm, Pleasant, Cooperative                          10.3   4.83  )-----------( 50       ( 09 Dec 2024 15:05 )             31.7       12-09    142  |  103  |  35[H]  ----------------------------<  129[H]  4.4   |  16[L]  |  1.44[H]    Ca    9.1      09 Dec 2024 15:05  Phos  2.9     12-09  Mg     2.4     12-09    TPro  3.8[L]  /  Alb  2.8[L]  /  TBili  4.7[H]  /  DBili  x   /  AST  198[H]  /  ALT  387[H]  /  AlkPhos  70  12-09      Urinalysis Basic - ( 09 Dec 2024 15:05 )    Color: x / Appearance: x / SG: x / pH: x  Gluc: 129 mg/dL / Ketone: x  / Bili: x / Urobili: x   Blood: x / Protein: x / Nitrite: x   Leuk Esterase: x / RBC: x / WBC x   Sq Epi: x / Non Sq Epi: x / Bacteria: x        MICROBIOLOGY:  v  .Blood BLOOD  12-07-24   No growth at 24 hours  --  --      Body Fluid  12-07-24   No growth to date.  --    polymorphonuclear leukocytes seen  No organisms seen  by cytocentrifuge      .Blood BLOOD  12-06-24   Growth in aerobic and anaerobic bottles: Escherichia coli  See previous culture [id number]  --    Growth in aerobic bottle: Gram Negative Rods  Growth in anaerobic bottle: Gram Negative Rods      .Blood BLOOD  12-06-24   Growth in aerobic and anaerobic bottles: Escherichia coli  Direct identification is available within approximately 3-5  hours either by Blood Panel Multiplexed PCR or Direct  MALDI-TOF. Details: [email or web address]  --  Blood Culture PCR  Escherichia coli      .Blood BLOOD  12-05-24   No growth at 72 Hours  --  --      .Blood BLOOD  12-05-24   No growth at 72 Hours  --  --      .Stool  12-01-24   No enteric pathogens isolated.  (Stool culture examined for Salmonella,  Shigella, Campylobacter, Aeromonas, Plesiomonas,  Vibrio, E.coli O157 and Yersinia)  --  --      .Blood BLOOD  11-26-24   No growth at 5 days  --  --      .Blood BLOOD  11-26-24   No growth at 5 days  --  --      Bronchial  11-24-24   Few Candida glabrata  --  Candida (Torulopsis) glabrata      .Blood BLOOD  11-24-24   No growth at 5 days  --  --      .Blood BLOOD  11-24-24   No growth at 5 days  --  --      Combi-Cath  11-23-24   Commensal charity consistent with body site  --    Moderate polymorphonuclear leukocytes per low power field  No squamous epithelial cells per low power field  No organisms seen      .Blood BLOOD  11-22-24   No growth at 5 days  --  --      .Blood BLOOD  11-20-24   No growth at 5 days  --  --      .Blood BLOOD  11-20-24   No growth at 5 days  --  --      Body Fluid  11-15-24   No growth at 5 days  --    No polymorphonuclear leukocytes seen  No organisms seen  by cytocentrifuge        Toxoplasma IgG Screen: 78.00 IU/mL (11-15-24 @ 00:41)  CMV IgG Antibody: 1.50 U/mL (11-15-24 @ 00:41)    CMVPCR Log: Det <1.54 Assay Dynamic Range: 34.5 to 1.0E+07 IU/mL (1.54 to 7.00 Log10 IU/mL)  Assay lower limit of quantification (LLOQ) is 34.5 IU/mL (1.54 Log10  IU/mL)  CMV DNA detected below the LLOQ will be reported as Detected < 34.5 IU/mL  (<1.54 Log10 IU/mL)  Nydia Cytomegalovirus (CMV) is an FDA-cleared quantitative test that  enables the detection and quantitation of CMV DNA in EDTA plasma of  infected transplant patients on the nydia 8800 system. This test was  verified by [employer]. Results should be interpreted  with consideration of all clinical findings and laboratory findings and  should not form the sole basis for a diagnosis or treatment decision. Ppx83IM/mL (12-06 @ 15:31)  CMVPCR Log: Det <1.54 Assay Dynamic Range: 34.5 to 1.0E+07 IU/mL (1.54 to 7.00 Log10 IU/mL)  Assay lower limit of quantification (LLOQ) is 34.5 IU/mL (1.54 Log10  IU/mL)  CMV DNA detected below the LLOQ will be reported as Detected < 34.5 IU/mL  (<1.54 Log10 IU/mL)  Nydia Cytomegalovirus (CMV) is an FDA-cleared quantitative test that  enables the detection and quantitation of CMV DNA in EDTA plasma of  infected transplant patients on the nydia 8800 system. This test was  verified by [employer]. Results should be interpreted  with consideration of all clinical findings and laboratory findings and  should not form the sole basis for a diagnosis or treatment decision. Vzx09FM/mL (12-03 @ 06:11)  CMVPCR Log: Det <1.54 Assay Dynamic Range: 34.5 to 1.0E+07 IU/mL (1.54 to 7.00 Log10 IU/mL)  Assay lower limit of quantification (LLOQ) is 34.5 IU/mL (1.54 Log10  IU/mL)  CMV DNA detected below the LLOQ will be reported as Detected < 34.5 IU/mL  (<1.54 Log10 IU/mL)  Nydia Cytomegalovirus (CMV) is an FDA-cleared quantitative test that  enables the detection and quantitation of CMV DNA in EDTA plasma of  infected transplant patients on the nydia 8800 system. This test was  verified by [employer]. Results should be interpreted  with consideration of all clinical findings and laboratory findings and  should not form the sole basis for a diagnosis or treatment decision. Yxn64PG/mL (12-02 @ 18:19)        RADIOLOGY:    <The imaging below has been reviewed and visualized by me independently. Findings as detailed in report below> Follow Up:  bacteremia.     Interval History: s/p ex-lap today s/p ileostomy. afebrile.     REVIEW OF SYSTEMS  [  ] ROS unobtainable because:    [ x ] All other systems negative except as noted below    Constitutional:  [ ] fever [ ] chills  [ ] weight loss  [ ] weakness  Skin:  [ ] rash [ ] phlebitis	  Eyes: [ ] icterus [ ] pain  [ ] discharge	  ENMT: [ ] sore throat  [ ] thrush [ ] ulcers [ ] exudates  Respiratory: [ ] dyspnea [ ] hemoptysis [ ] cough [ ] sputum	  Cardiovascular:  [ ] chest pain [ ] palpitations [ ] edema	  Gastrointestinal:  [ ] nausea [ ] vomiting [ ] diarrhea [ ] constipation [ ] pain	  Genitourinary:  [ ] dysuria [ ] frequency [ ] hematuria [ ] discharge [ ] flank pain  [ ] incontinence  Musculoskeletal:  [ ] myalgias [ ] arthralgias [ ] arthritis  [ ] back pain  Neurological:  [ ] headache [ ] seizures  [ ] confusion/altered mental status    Allergies  No Known Allergies        ANTIMICROBIALS:  ganciclovir IVPB 120 every 24 hours  linezolid  IVPB 600 every 12 hours  meropenem  IVPB 1000 every 8 hours  trimethoprim / sulfamethoxazole IVPB 160 daily      OTHER MEDS:  MEDICATIONS  (STANDING):  aMIOdarone Infusion 0.5 <Continuous>  dexMEDEtomidine Infusion 1 <Continuous>  dextrose 50% Injectable 50 every 15 minutes  DOBUTamine Infusion 5 <Continuous>  hydrocortisone sodium succinate Injectable 50 every 8 hours  insulin regular Infusion 1 <Continuous>  norepinephrine Infusion 0.01 <Continuous>  pantoprazole  Injectable 40 every 12 hours  vasopressin Infusion 0.01 <Continuous>      Vital Signs Last 24 Hrs  T(C): 37.5 (09 Dec 2024 14:30), Max: 37.5 (09 Dec 2024 14:30)  T(F): 99.5 (09 Dec 2024 14:30), Max: 99.5 (09 Dec 2024 14:30)  HR: 85 (09 Dec 2024 15:15) (69 - 113)  BP: 128/38 (09 Dec 2024 10:11) (128/38 - 128/38)  BP(mean): 60 (09 Dec 2024 10:11) (60 - 60)  RR: 18 (09 Dec 2024 15:15) (17 - 24)  SpO2: 100% (09 Dec 2024 15:15) (94% - 100%)    Parameters below as of 09 Dec 2024 14:30  Patient On (Oxygen Delivery Method): ventilator    O2 Concentration (%): 40    PHYSICAL EXAMINATION:  General: Intubated and Sedated  HEENT: +ETT  Neck: Supple  Cardiac: RRR, No M/R/G  Resp: CTAB, No Wh/Rh/Ra  Abdomen: +ileostomy, dressing over surgical site, NBS, NT/ND, No HSM, No rigidity or guarding  MSK: No LE edema. No Calf tenderness  Skin: No rashes or lesions. Skin is warm and dry to the touch.   Neuro: Intubated and Sedated  Psych: Unable to assess - intubated and sedated                          10.3   4.83  )-----------( 50       ( 09 Dec 2024 15:05 )             31.7       12-09    142  |  103  |  35[H]  ----------------------------<  129[H]  4.4   |  16[L]  |  1.44[H]    Ca    9.1      09 Dec 2024 15:05  Phos  2.9     12-09  Mg     2.4     12-09    TPro  3.8[L]  /  Alb  2.8[L]  /  TBili  4.7[H]  /  DBili  x   /  AST  198[H]  /  ALT  387[H]  /  AlkPhos  70  12-09      Urinalysis Basic - ( 09 Dec 2024 15:05 )    Color: x / Appearance: x / SG: x / pH: x  Gluc: 129 mg/dL / Ketone: x  / Bili: x / Urobili: x   Blood: x / Protein: x / Nitrite: x   Leuk Esterase: x / RBC: x / WBC x   Sq Epi: x / Non Sq Epi: x / Bacteria: x        MICROBIOLOGY:  v  .Blood BLOOD  12-07-24   No growth at 24 hours  --  --      Body Fluid  12-07-24   No growth to date.  --    polymorphonuclear leukocytes seen  No organisms seen  by cytocentrifuge      .Blood BLOOD  12-06-24   Growth in aerobic and anaerobic bottles: Escherichia coli  See previous culture [id number]  --    Growth in aerobic bottle: Gram Negative Rods  Growth in anaerobic bottle: Gram Negative Rods      .Blood BLOOD  12-06-24   Growth in aerobic and anaerobic bottles: Escherichia coli  Direct identification is available within approximately 3-5  hours either by Blood Panel Multiplexed PCR or Direct  MALDI-TOF. Details: [email or web address]  --  Blood Culture PCR  Escherichia coli      .Blood BLOOD  12-05-24   No growth at 72 Hours  --  --      .Blood BLOOD  12-05-24   No growth at 72 Hours  --  --      .Stool  12-01-24   No enteric pathogens isolated.  (Stool culture examined for Salmonella,  Shigella, Campylobacter, Aeromonas, Plesiomonas,  Vibrio, E.coli O157 and Yersinia)  --  --      .Blood BLOOD  11-26-24   No growth at 5 days  --  --      .Blood BLOOD  11-26-24   No growth at 5 days  --  --      Bronchial  11-24-24   Few Candida glabrata  --  Candida (Torulopsis) glabrata      .Blood BLOOD  11-24-24   No growth at 5 days  --  --      .Blood BLOOD  11-24-24   No growth at 5 days  --  --      Combi-Cath  11-23-24   Commensal charity consistent with body site  --    Moderate polymorphonuclear leukocytes per low power field  No squamous epithelial cells per low power field  No organisms seen      .Blood BLOOD  11-22-24   No growth at 5 days  --  --      .Blood BLOOD  11-20-24   No growth at 5 days  --  --      .Blood BLOOD  11-20-24   No growth at 5 days  --  --      Body Fluid  11-15-24   No growth at 5 days  --    No polymorphonuclear leukocytes seen  No organisms seen  by cytocentrifuge        Toxoplasma IgG Screen: 78.00 IU/mL (11-15-24 @ 00:41)  CMV IgG Antibody: 1.50 U/mL (11-15-24 @ 00:41)    CMVPCR Log: Det <1.54 Assay Dynamic Range: 34.5 to 1.0E+07 IU/mL (1.54 to 7.00 Log10 IU/mL)  Assay lower limit of quantification (LLOQ) is 34.5 IU/mL (1.54 Log10  IU/mL)  CMV DNA detected below the LLOQ will be reported as Detected < 34.5 IU/mL  (<1.54 Log10 IU/mL)  Nydia Cytomegalovirus (CMV) is an FDA-cleared quantitative test that  enables the detection and quantitation of CMV DNA in EDTA plasma of  infected transplant patients on the nydia 8800 system. This test was  verified by [employer]. Results should be interpreted  with consideration of all clinical findings and laboratory findings and  should not form the sole basis for a diagnosis or treatment decision. Alq16ZC/mL (12-06 @ 15:31)  CMVPCR Log: Det <1.54 Assay Dynamic Range: 34.5 to 1.0E+07 IU/mL (1.54 to 7.00 Log10 IU/mL)  Assay lower limit of quantification (LLOQ) is 34.5 IU/mL (1.54 Log10  IU/mL)  CMV DNA detected below the LLOQ will be reported as Detected < 34.5 IU/mL  (<1.54 Log10 IU/mL)  Nydia Cytomegalovirus (CMV) is an FDA-cleared quantitative test that  enables the detection and quantitation of CMV DNA in EDTA plasma of  infected transplant patients on the nydia 8800 system. This test was  verified by [employer]. Results should be interpreted  with consideration of all clinical findings and laboratory findings and  should not form the sole basis for a diagnosis or treatment decision. Oua63CG/mL (12-03 @ 06:11)  CMVPCR Log: Det <1.54 Assay Dynamic Range: 34.5 to 1.0E+07 IU/mL (1.54 to 7.00 Log10 IU/mL)  Assay lower limit of quantification (LLOQ) is 34.5 IU/mL (1.54 Log10  IU/mL)  CMV DNA detected below the LLOQ will be reported as Detected < 34.5 IU/mL  (<1.54 Log10 IU/mL)  Nydia Cytomegalovirus (CMV) is an FDA-cleared quantitative test that  enables the detection and quantitation of CMV DNA in EDTA plasma of  infected transplant patients on the nydia 8800 system. This test was  verified by [employer]. Results should be interpreted  with consideration of all clinical findings and laboratory findings and  should not form the sole basis for a diagnosis or treatment decision. Rik40RR/mL (12-02 @ 18:19)        RADIOLOGY:    <The imaging below has been reviewed and visualized by me independently. Findings as detailed in report below>    < from: Xray Chest 1 View- PORTABLE-Routine (12.09.24 @ 04:23) >  IMPRESSION:  Stable findings.    < end of copied text >

## 2024-12-09 NOTE — BRIEF OPERATIVE NOTE - OPERATION/FINDINGS
3 laparotomy pads removed from abdomen and confirmed on xray  small bowel and rectal stump appeared viable  Nasoduodenal tube placed   Left ureter was traced and seen to be intact  Good doppler signal over graft and hepatic artery  Ileostomy appeared viable   Fascia closed without tension 3 laparotomy pads removed from abdomen and confirmed on xray  small bowel and rectal stump appeared viable  Nasoduodenal tube placed   Good doppler signal over graft and hepatic artery  Ileostomy appeared viable   Fascia closed without tension

## 2024-12-09 NOTE — PROGRESS NOTE ADULT - SUBJECTIVE AND OBJECTIVE BOX
Transplant Surgery - Multidisciplinary Progress Note  --------------------------------------------------------------  OLT   11/15/2024        POD#24  Colectomy 12/7/2024   POD#2  IABP 12/7    Present:   Patient seen and examined with multidisciplinary Transplant team including Surgeon: Dr. Palomino, Dr. Ozzie Caraballo, Hepatologist: Dr. Roe and SICU team in AM rounds.   Disciplines not in attendance will be notified of the plan.     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     s/p OLT 11/15 with post op course c/b:  s/p Coloctomy 12/7. ORL 2uPRBC/2uFFP  IABP 12/7  Hypoxia: Reintubated 11/20, extubated 11/24->reintubated 11/24, extubated 11/26   Ileus   Fever  A fib  AMS/Seizure   L brachial DVT  Neutropenia    Interval/Overnight Events:                     Immunosuppression:  -Cyclo (held), MMF held this am, on stress dose steroids  -ongoing monitoring for signs of rejection    Potential Discharge date: Pending clinical course  Education: Medications  Plan of care: See Below                  ROS: Unable to assess patient is intubated, sedated    PHYSICAL EXAM:   Constitutional: intubated, sedated   Eyes:  PERRLA  ENMT: nc/at, no thrush   Neck: supple   Respiratory: CTA B/L  Cardiovascular: RRR  Gastrointestinal: incision open, clean dry with Abthera  Genitourinary: nichols  Extremities: SCD's in place and working bilaterally, + LE edema  Neurological: intubated, sedated  Skin: no rashes, ulcerations, lesions Transplant Surgery - Multidisciplinary Progress Note  --------------------------------------------------------------  OLT   11/15/2024        POD#24  Colectomy 2024   POD#2  IABP     Present:   Patient seen and examined with multidisciplinary Transplant team including Surgeon: Dr. Palomino, Dr. Sorto, JAZZ Caraballo/[facility], Hepatologist: Dr. Roe and ICU team in AM rounds.   Disciplines not in attendance will be notified of the plan.     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     s/p OLT 11/15 with post op course c/b:  ·	Hypoxia: Reintubated , extubated ->reintubated , extubated , reintubated   ·	Ileus   ·	Fever  ·	A fib  ·	AMS/Seizure   ·	L brachial DVT  ·	Neutropenia  ·	E coli Bacteremia ()  ·	s/p Coloctomy .   ·	IABP     Interval/Overnight Events:   - LFTs trending down  - remains on CRRT, low u/o   - on vaso/  - plan for RTOR today (washout/possible closure)      Immunosuppression:  -Cyclo (held), MMF held this am, on stress dose steroids  -ongoing monitoring for signs of rejection    Potential Discharge date: Pending clinical course  Education: Medications  Plan of care: See Below      MEDICATIONS  (STANDING):  aMIOdarone Infusion 1 mG/Min (33.3 mL/Hr) IV Continuous <Continuous>  artificial tears (preservative free) Ophthalmic Solution 1 Drop(s) Both EYES two times a day  Calcium Chloride 5 Gram(s),Sodium Chloride 400 milliLiter(s) 5 Gram(s) (16.67 mL/Hr) IV Continuous <Continuous>  caspofungin IVPB 50 milliGRAM(s) IV Intermittent every 24 hours  chlorhexidine 0.12% Liquid 15 milliLiter(s) Oral Mucosa every 12 hours  chlorhexidine 2% Cloths 1 Application(s) Topical daily  CRRT Treatment    <Continuous>  dexMEDEtomidine Infusion 0.03 MICROgram(s)/kG/Hr (0.7 mL/Hr) IV Continuous <Continuous>  DOBUTamine Infusion 5 MICROgram(s)/kG/Min (14 mL/Hr) IV Continuous <Continuous>  ganciclovir IVPB 120 milliGRAM(s) IV Intermittent every 24 hours  hydrocortisone sodium succinate Injectable 50 milliGRAM(s) IV Push every 8 hours  linezolid  IVPB 600 milliGRAM(s) IV Intermittent every 12 hours  meropenem  IVPB 1000 milliGRAM(s) IV Intermittent every 8 hours  pantoprazole  Injectable 40 milliGRAM(s) IV Push daily  PrismaSATE Dialysate BGK 4 / 2.5 5000 milliLiter(s) (1300 mL/Hr) CRRT <Continuous>  PrismaSOL Filtration BGK 4 / 2.5 5000 milliLiter(s) (1100 mL/Hr) CRRT <Continuous>  PrismaSOL Filtration BGK 4 / 2.5 5000 milliLiter(s) (200 mL/Hr) CRRT <Continuous>  sodium chloride 0.9%. 1000 milliLiter(s) (10 mL/Hr) IV Continuous <Continuous>  trimethoprim / sulfamethoxazole IVPB 160 milliGRAM(s) IV Intermittent daily  vasopressin Infusion 0.06 Unit(s)/Min (9 mL/Hr) IV Continuous <Continuous>      PAST MEDICAL & SURGICAL HISTORY:  Diabetes  Transaminitis  Paroxysmal atrial fibrillation  Depression  BPH (benign prostatic hyperplasia)  Hypertension  Chronic atrial fibrillation  Coronary artery disease  Hepatocellular carcinoma  DM (diabetes mellitus)  HTN (hypertension)  Paroxysmal atrial fibrillation  Cirrhosis  HCC (hepatocellular carcinoma)  History of BPH  History of laparoscopic cholecystectomy  History of lumbar laminectomy  H/O prior ablation treatment  h/O percutaneous left heart catheterization    Vital Signs Last 24 Hrs  T(C): 36.5 (09 Dec 2024 04:00), Max: 36.5 (08 Dec 2024 12:00)  T(F): 97.7 (09 Dec 2024 04:00), Max: 97.7 (08 Dec 2024 12:00)  HR: 104 (09 Dec 2024 07:00) (71 - 113)  BP: --  BP(mean): --  RR: 18 (09 Dec 2024 07:00) (18 - 24)  SpO2: 100% (09 Dec 2024 07:00) (94% - 100%)    Parameters below as of 09 Dec 2024 04:00  Patient On (Oxygen Delivery Method): ventilator  O2 Flow (L/min): 40      I&O's Summary    08 Dec 2024 07:01  -  09 Dec 2024 07:00  --------------------------------------------------------  IN: 5458.3 mL / OUT: 3963 mL / NET: 1495.3 mL                       10.6   5.16  )-----------( 29       ( 09 Dec 2024 04:03 )             32.3         139  |  105  |  35[H]  ----------------------------<  129[H]  4.3   |  16[L]  |  1.29    Ca    7.8[L]      09 Dec 2024 04:03  Phos  2.8       Mg     2.2         TPro  3.2[L]  /  Alb  2.3[L]  /  TBili  4.4[H]  /  DBili  x   /  AST  331[H]  /  ALT  667[H]  /  AlkPhos  67      Culture - Blood (collected 24 @ 15:30)  Source: .Blood BLOOD  Preliminary Report (24 @ 19:01):    No growth at 24 hours    Culture - Body Fluid with Gram Stain (collected 24 @ 11:18)  Source: Body Fluid  Gram Stain (24 @ 18:22):    polymorphonuclear leukocytes seen    No organisms seen    by cytocentrifuge  Preliminary Report (24 @ 10:36):    No growth to date.    Culture - Blood (collected 24 @ 13:30)  Source: .Blood BLOOD  Gram Stain (24 @ 03:44):    Growth in aerobic bottle: Gram Negative Rods    Growth in anaerobic bottle: Gram Negative Rods  Final Report (24 @ 13:18):    Growth in aerobic and anaerobic bottles: Escherichia coli    See previous culture [id number]    Culture - Blood (collected 24 @ 13:25)  Source: .Blood BLOOD  Gram Stain (24 @ 03:07):    Growth in aerobic and anaerobic bottles: Gram Negative Rods  Final Report (24 @ 13:17):    Growth in aerobic and anaerobic bottles: Escherichia coli    Direct identification is available within approximately 3-5    hours either by Blood Panel Multiplexed PCR or Direct    MALDI-TOF. Details: [email or web address]  Organism: Blood Culture PCR  Escherichia coli (24 @ 13:17)  Organism: Escherichia coli (24 @ 13:17)  Organism: Blood Culture PCR (24 @ 13:17)    Culture - Blood (collected 24 @ 14:15)  Source: .Blood BLOOD  Preliminary Report (24 @ 18:00):    No growth at 72 Hours    Culture - Blood (collected 24 @ 14:00)  Source: .Blood BLOOD  Preliminary Report (24 @ 18:00):    No growth at 72 Hours      ROS: Unable to assess patient is intubated, sedated    PHYSICAL EXAM:   Constitutional: intubated, sedated   Eyes:  PERRLA  ENMT: nc/at, no thrush   Neck: supple   Respiratory: CTA B/L  Cardiovascular: RRR  Gastrointestinal: incision open, clean dry with Abthera  Genitourinary: nichols  Extremities: SCD's in place and working bilaterally, + LE edema  Neurological: intubated, sedated  Skin: no rashes, ulcerations, lesions

## 2024-12-09 NOTE — PRE-ANESTHESIA EVALUATION ADULT - NSRADCARDRESULTSFT_GEN_ALL_CORE
< from: TTE W or WO Ultrasound Enhancing Agent (12.07.24 @ 16:05) >       CONCLUSIONS:      1. Limited TTE performed to re-assess biventricular systolic function at normal heart rates.   2. Left ventricular systolic function is moderately decreased with an ejection fraction visually estimated at 35 to 40 %. Global left ventricular hypokinesis.   3. The right ventricle is not well visualized. Based on visual assessment, the right ventricle appears mildly enlarged. probably normal right ventricular systolic function.   4. No pericardial effusion seen.   5. Compared to the transthoracic echocardiogram performed on 12/6/2024, LV systolic function has decreased.    < end of copied text >    < from: Cardiac Catheterization (04.24.24 @ 09:34) >    Diagnostic Conclusions:         Diagnostic Summary:     - Non-obstructive CAD - Mid RCA 50% FFR negative 0.85, Distal LCx    50-60% FFR negative 0.86    - Remainder of vessels with mild luminal irregularities, co-dominant    coronary circulation    - LVEDP 19, no LV-Ao gradient on pullback     - Cathworks FFR with 3D functional mapping of color-coded FFR utilized    for analysis    < end of copied text >

## 2024-12-09 NOTE — PRE-ANESTHESIA EVALUATION ADULT - ANESTHESIA, PREVIOUS REACTION, PROFILE
Concern for family hx of MH/malignant hyperthermia/unknown
Concern for family hx of MH/malignant hyperthermia/unknown

## 2024-12-09 NOTE — PROGRESS NOTE ADULT - ASSESSMENT
Patient seen and examined POD 0 s/p exploratory laparotomy with washout and ileostomy creation and closure of abdominal fascia   Plan:  - continue resuscitation with vasopressors, inotropes and albumin  - continue antibiotics  - empty drains every 30 min  - scrotum to be dressed with bacitracin and offloaded  - liver doppler done, read pending  - Nasoduodenal tube in place, hold tube feeds today  - trend labs

## 2024-12-09 NOTE — PRE-ANESTHESIA EVALUATION ADULT - NSANTHPEFT_GEN_ALL_CORE
PE reviewed
tachycardic, tachypneic, intubated,
GENERAL: NAD, sedated  HEAD:  Atraumatic, Normocephalic  CHEST/LUNG: intubated Clear to auscultation bilaterally  HEART: Normal S1/S2  PSYCH: Sedated  NEUROLOGY: non-focal  SKIN: No obvious rashes or lesions  IABP in place

## 2024-12-09 NOTE — BRIEF OPERATIVE NOTE - NSICDXBRIEFPOSTOP_GEN_ALL_CORE_FT
POST-OP DIAGNOSIS:  Liver cirrhosis 15-Nov-2024 18:05:10  Marisa Arce  
POST-OP DIAGNOSIS:  Septic shock 07-Dec-2024 08:50:00  Bere Sorto  Colonic ischemia 07-Dec-2024 08:50:11  Bere Sorto  
POST-OP DIAGNOSIS:  Bowel obstruction 09-Dec-2024 15:37:45  Bere Sorto  
POST-OP DIAGNOSIS:  Cardiogenic shock 07-Dec-2024 08:09:28  Les Dugan

## 2024-12-10 ENCOUNTER — RESULT REVIEW (OUTPATIENT)
Age: 74
End: 2024-12-10

## 2024-12-10 NOTE — CHART NOTE - NSCHARTNOTEFT_GEN_A_CORE
NUTRITION FOLLOW UP NOTE    PATIENT SEEN FOR: s/p OLT follow up; transfer to CTU    SOURCE: [] Patient  [x] Current Medical Record  [] RN  [] Family/support person at bedside  [x] Patient unavailable/inappropriate  [x] Other: Team     CHART REVIEWED/EVENTS NOTED.  [x] Nutrition Status:  -Intubated    -S/p OR : Balloon pump placement 2/2 cardiogenic shock; total abdominal colectomy with ileostomy 2/2 colonic ischemia   -RTOR : ex-lap with closure   -SLP signed off : in setting of ongoing intubation status   -CRRT     NUTRITION INTAKE/PROVISION:  [x] PO: NPO x ~1week     ANTHROPOMETRICS:  Drug Dosing Weight  Height (cm): 182.9 (09 Dec 2024 10:11)  Weight (kg): 93.6 (09 Dec 2024 10:11)  BMI (kg/m2): 28 (09 Dec 2024 10:11)  Daily Weight in k (12-10), Weight in k.2 (), Weight in k.3 ()     NUTRITIONALLY PERTINENT MEDICATIONS:  MEDICATIONS  (STANDING):  albumin human 25% IVPB  aMIOdarone Infusion  caspofungin IVPB  cefepime   IVPB  cefepime   IVPB  dextrose 50% Injectable  DOBUTamine Infusion  ganciclovir IVPB  hydrocortisone sodium succinate Injectable  insulin regular Infusion  linezolid  IVPB  metroNIDAZOLE  IVPB  norepinephrine Infusion  pantoprazole  Injectable  sodium phosphate 15 milliMole(s)/250 mL IVPB  trimethoprim / sulfamethoxazole IVPB  vasopressin Infusion       NUTRITIONALLY PERTINENT LABS:  12-10 Na140 mmol/L Glu 131 mg/dL[H] K+ 4.3 mmol/L Cr  1.10 mg/dL BUN 32 mg/dL[H] 12-10 Phos 2.1 mg/dL[L] 12-10 Alb 3.0 g/dL[L]12-10  U/L[H] AST 65 U/L[H] Alkaline Phosphatase 64 U/L            Finger Sticks:  POCT Blood Glucose.: 123 mg/dL (12-10 @ 08:24)  POCT Blood Glucose.: 103 mg/dL (12-10 @ 06:04)  POCT Blood Glucose.: 95 mg/dL (12-10 @ 02:58)  POCT Blood Glucose.: 104 mg/dL (12-10 @ 01:46)  POCT Blood Glucose.: 111 mg/dL (12-10 @ 00:55)  POCT Blood Glucose.: 128 mg/dL ( @ 23:51)  POCT Blood Glucose.: 141 mg/dL ( @ 23:12)  POCT Blood Glucose.: 158 mg/dL ( @ 21:45)      NUTRITIONALLY PERTINENT MEDICATIONS/LABS:  [x] Reviewed  [x] Relevant notes on medications/labs:  -Levo/Vaso  -Solu-cortef; insulin drip   -Precedex   -Albumin   -Hypophosphatemic; NaPhos for repletion     EDEMA:  [x] Reviewed  [] Relevant notes:    GI/ I&O:  [x] Reviewed  -OGT to suction --> 50cc out ()   -Ostomy in place --> no output recorded at this time     SKIN:   [x] Pressure injury previously noted  -Sacral/Spine: Suspected deep tissue injury  [x] Change in pressure injury documentation:  -R heel: deep tissue injury  -L heel: suspected deep tissue injury   -Lip: Suspected deep tissue injury      ESTIMATED NEEDS:  [x] No change:  Energy:   2178-2582kcal/day (27-32 kcal/kg)  Protein:   97-121g/day (1.2-1.5 g/kg)  Fluid:   ml/day or [x] defer to team  Based on: 80.7kg   [place]: 1960kcal/day (reference while intubated)     NUTRITION DIAGNOSIS:  1. Increased protein-energy needs   2. Severe malnutrition (Acute)    EDUCATION:  [] Yes:  [x] Not appropriate/warranted    NUTRITION CARE PLAN:  1. Diet: defer diet to Team   2. Supplements: N/A  3. Multivitamin/mineral supplementation: multivitamin, vitamin C    MONITORING AND EVALUATION:   RD remains available upon request and will follow up per protocol.    Malorie Pike, MS, RDN, CDN (Teams)   Available on [facility] TEAMS

## 2024-12-10 NOTE — PROGRESS NOTE ADULT - ASSESSMENT
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 Pt underwent Y90 2023 with favorable treatment response.s/p OLT 11/15/24.    post op course c/b  recurrent intubation  AMS  \Sepsis with \e.coli bactremia and CMV viremia  MORAIMA requiring CVVH  Ileus and ischemic colon s/p total colectomy  with open abdomen  intra op TTE with severe Biv dysfunction s/p IABP placed    Plan:    5mcg/kg/min, off Levo  12/10: RA 9 , PA38/ 18, MVO2 in 70-80% with CI 3.5  MAPS STABLE ON LOW DOSE VASO, WEAN PRESSORS FOR map 65-75  will remove IABP given thrombocytopenia and unable to tolerate heparin drip.   TTE poor quality images. will repeat TTE with contrast after   on CVVH though he has leg edema, his intravascular volume is normal based on RA pressure, so will cont to do net even to slightly negative.    ID following  cont broad spectrum abx  blood cx from  neg to date   bld cx still pisotive for E. coli  IS as per Transplant hepatology  Renal following  plan d/w CTICU team  d/w Pt's family Ankit Magdaleno and Laney  HF team will cont follow

## 2024-12-10 NOTE — PROGRESS NOTE ADULT - ASSESSMENT
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 Post-op course c/b worsening mental status and acute hypoxic respiratory failure requiring multiple intubations/extubations, currently intubated (as of 12/7/2024) and cardiogenic shock s/p Percutaneous insertion of an intra-aortic balloon pump and septic shock/colonic ischemia s/p total abdominal colectomy with ileostomy on 12/7/24,  s/p ex-lap w/ileostomy on 12/9/24. Urology consulted for scrotal edema w/ large skin breakdown. The symptom start one week ago and getting worse recent two days with large area skin breakdown and redness on the whole scrotal area, as well the bilateral groin and internal thigh. Castro in place with clear yellowish colored urine.   In CTU, pt WBC 7, H/H 10/32, Cr 1.29, Lactate 8.7. blood culture on 12/6 growth E. coli, and no growth on 12/7. Patient off pressor currently.    Scrotal edema & skin breakdown possibly 2/2 intra-abdominal fluid tracking vs third-spacing from cardiogenic shock    Recommendations:   - No acute urologic intervention indicated   - Scrotal elevation  - Serial examination of scrotum  - Ultrasound scrotum  - Wound care consult   - Rest of care per primary team  - Discussed with Dr. Collier    Plan discussed w/ Dr. Benito Collier  Urology

## 2024-12-10 NOTE — PROGRESS NOTE ADULT - SUBJECTIVE AND OBJECTIVE BOX
[facility]-- WOUND TEAM -- FOLLOW UP NOTE  --------------------------------------------------------------------------------    24 hour events/subjective:    afebrile  intubated/ sedated/ pressors  pt on CVVHD  s/p POD #3 placement Intraaortic balloon pump  s/p POD #3 Exploratory laparotomy, Total abdominal colectomy, temporary abdominal closure with AbThera VAC device.        in pt w/ Liver transplant & Toxic megacolon  s/p POD #1 RTOR Laparotomy, exploratory, Ileostomy, Closure of fascia of abdomen, & Insertion, feeding tube  (+)nichols  pt noted w/ clear weeping drainage from open skin     no odor  son at bedside- visualized wounds     all questions answered to his expressed understanding/ satisfaction          ROS: pt unable to offer    ALLERGIES & MEDICATIONS  --------------------------------------------------------------------------------    No Known Allergies      STANDING INPATIENT MEDICATIONS  albumin human  5% IVPB 250 milliLiter(s) IV Intermittent once  albumin human 25% IVPB 100 milliLiter(s) IV Intermittent every 8 hours  aMIOdarone Infusion 0.501 mG/Min IV Continuous <Continuous>  Calcium Chloride 5 Gram(s),Sodium Chloride 0.9% 400 milliLiter(s) 5 Gram(s) IV Continuous <Continuous>  caspofungin IVPB 50 milliGRAM(s) IV Intermittent every 24 hours    cefepime   IVPB 1000 milliGRAM(s) IV Intermittent every 8 hours  chlorhexidine 0.12% Liquid 15 milliLiter(s) Oral Mucosa every 12 hours  chlorhexidine 2% Cloths 1 Application(s) Topical <User Schedule>  CRRT Treatment    <Continuous>  dexMEDEtomidine Infusion 1 MICROgram(s)/kG/Hr IV Continuous <Continuous>  dextrose 50% Injectable 50 milliLiter(s) IV Push every 15 minutes  DOBUTamine Infusion 5 MICROgram(s)/kG/Min IV Continuous <Continuous>  ganciclovir IVPB 120 milliGRAM(s) IV Intermittent every 24 hours  insulin regular Infusion 1 Unit(s)/Hr IV Continuous <Continuous>  linezolid  IVPB 600 milliGRAM(s) IV Intermittent every 12 hours  metroNIDAZOLE  IVPB 500 milliGRAM(s) IV Intermittent every 12 hours  norepinephrine Infusion 0.01 MICROgram(s)/kG/Min IV Continuous <Continuous>  pantoprazole  Injectable 40 milliGRAM(s) IV Push every 12 hours  Phoxillum Filtration BK 4 / 2.5 5000 milliLiter(s) CRRT <Continuous>  Phoxillum Filtration BK 4 / 2.5 5000 milliLiter(s) CRRT <Continuous>  PrismaSATE Dialysate BGK 4 / 2.5 5000 milliLiter(s) CRRT <Continuous>  sodium bicarbonate  Injectable 50 milliEquivalent(s) IV Push once  trimethoprim / sulfamethoxazole IVPB 160 milliGRAM(s) IV Intermittent daily  vasopressin Infusion 0.01 Unit(s)/Min IV Continuous <Continuous>          VITALS/PHYSICAL EXAM  --------------------------------------------------------------------------------  T(C): 37.1 (12-10-24 @ 12:00), Max: 37.1 (12-10-24 @ 12:00)  HR: 78 (12-10-24 @ 15:45) (67 - 88)  BP: --  RR: 23 (12-10-24 @ 15:45) (18 - 29)  SpO2: 98% (12-10-24 @ 15:45) (93% - 100%)  Wt(kg): --  Height (cm): 182.9 (12-09-24 @ 10:11)  Weight (kg): 93.6 (12-09-24 @ 10:11)  BMI (kg/m2): 28 (12-09-24 @ 10:11)  BSA (m2): 2.16 (12-09-24 @ 10:11)      Guarded but stable, WN/ WG/ WD  intubated, sedated, pressors  Progressa  HEENT: NC/AT, sclera clear, mucosa moist, throat clear, trachea midline, neck supple  Respiratory: Nonlabored w/ equal chest rise, vent  Gastrointestinal: soft NT/ND (+)stoma moist, viable, functioning     abdominal incision dressing lifting - staples intact     clear serosanguinous drainage noted     inderior/ lateral abdominal wall ruptured blister w/ denuded skin      no odor, tenderness, increased warmth, erythema, crepitus, induration, or fluctuance  : (+) nichols, scrotal edema     Scrotum into thighs pink denuded clear yellow serous weeping   no blistering, odor, tenderness, increased warmth, erythema, crepitus, induration, or fluctuance  Neurology: sedated  Psych: difficult to assess  Musculoskeletal:  pROM, no deformities/ contractures  Vascular: BLE edema equal, no palpable DP pulses  Skin: moist w/ good turgor, anasarca   Sacrum/bilateral buttocks DIT    denuded / partial thickness skin loss w/ hyperpigmented skin in periwound   6.0cm x 4.0cm x 0.1cm, scant serosanguinous drainage, periwound erythema   scant yellow clear weeping noted on attends pad  no blistering, odor, tenderness, increased warmth, erythema, crepitus, induration, or fluctuance            LABS/ CULTURES/ RADIOLOGY:              10.6   7.71  >-----------<  38       [12-10-24 @ 15:43]              32.0     138  |  103  |  32  ----------------------------<  149      [12-10-24 @ 15:43]  4.5   |  16  |  1.07        Ca     7.9     [12-10-24 @ 15:43]      Mg     2.4     [12-10-24 @ 15:43]      Phos  2.8     [12-10-24 @ 15:43]    TPro  3.8  /  Alb  2.8  /  TBili  8.1  /  DBili  x   /  AST  55  /  ALT  183  /  AlkPhos  67  [12-10-24 @ 15:43]    PT/INR: PT 22.0 , INR 1.94       [12-10-24 @ 08:42]  PTT: 54.9       [12-10-24 @ 08:42]      CAPILLARY BLOOD GLUCOSE  POCT Blood Glucose.: 127 mg/dL (10 Dec 2024 15:05)  POCT Blood Glucose.: 127 mg/dL (10 Dec 2024 11:40)  POCT Blood Glucose.: 127 mg/dL (10 Dec 2024 10:29)  POCT Blood Glucose.: 123 mg/dL (10 Dec 2024 08:24)  POCT Blood Glucose.: 103 mg/dL (10 Dec 2024 06:04)  POCT Blood Glucose.: 95 mg/dL (10 Dec 2024 02:58)  POCT Blood Glucose.: 104 mg/dL (10 Dec 2024 01:46)  POCT Blood Glucose.: 111 mg/dL (10 Dec 2024 00:55)  POCT Blood Glucose.: 128 mg/dL (09 Dec 2024 23:51)  POCT Blood Glucose.: 141 mg/dL (09 Dec 2024 23:12)  POCT Blood Glucose.: 158 mg/dL (09 Dec 2024 21:45)      Vitamin D, 25-Hydroxy: <6.0 ng/mL (11-21-24 @ 08:32)    Culture - Blood (collected 12-07-24 @ 15:30)  Source: .Blood BLOOD  Preliminary Report (12-09-24 @ 19:01):    No growth at 48 Hours    Culture - Blood (collected 12-06-24 @ 13:30)  Source: .Blood BLOOD  Gram Stain (12-07-24 @ 03:44):    Growth in aerobic bottle: Gram Negative Rods    Growth in anaerobic bottle: Gram Negative Rods  Final Report (12-08-24 @ 13:18):    Growth in aerobic and anaerobic bottles: Escherichia coli    See previous culture [id number]    Culture - Blood (collected 12-06-24 @ 13:25)  Source: .Blood BLOOD  Gram Stain (12-07-24 @ 03:07):    Growth in aerobic and anaerobic bottles: Gram Negative Rods  Final Report (12-08-24 @ 13:17):    Growth in aerobic and anaerobic bottles: Escherichia coli    Direct identification is available within approximately 3-5    hours either by Blood Panel Multiplexed PCR or Direct    MALDI-TOF. Details: [email or web address]  Organism: Blood Culture PCR  Escherichia coli (12-08-24 @ 13:17)  Organism: Escherichia coli (12-08-24 @ 13:17)      Method Type: ESAU      -  Ampicillin: R >16 These ampicillin results predict results for amoxicillin      -  Ampicillin/Sulbactam: R >16/8      -  Aztreonam: I 8      -  Cefazolin: R >16      -  Cefepime: S <=2      -  Cefoxitin: R >16      -  Ceftriaxone: S <=1      -  Ciprofloxacin: S <=0.25      -  Ertapenem: S <=0.5      -  Gentamicin: S <=2      -  Imipenem: S <=1      -  Levofloxacin: S <=0.5      -  Meropenem: S <=1      -  Piperacillin/Tazobactam: SDD 16      -  Tobramycin: S <=2      -  Trimethoprim/Sulfamethoxazole: R >2/38  Organism: Blood Culture PCR (12-08-24 @ 13:17)      Method Type: PCR      -  Escherichia coli: Detec           [facility]-- WOUND TEAM -- FOLLOW UP NOTE  --------------------------------------------------------------------------------    24 hour events/subjective:    afebrile  intubated/ sedated/ pressors  pt on CVVHD  s/p POD #3 placement Intraaortic balloon pump  s/p POD #3 Exploratory laparotomy, Total abdominal colectomy, temporary abdominal closure with AbThera VAC device.        in pt w/ Liver transplant & Toxic megacolon  s/p POD #1 RTOR Laparotomy, exploratory, Ileostomy, Closure of fascia of abdomen, & Insertion, feeding tube  (+)nichols  pt noted w/ clear weeping drainage from open skin     no odor  son at bedside- visualized wounds     all questions answered to his expressed understanding/ satisfaction          ROS: pt unable to offer    ALLERGIES & MEDICATIONS  --------------------------------------------------------------------------------    No Known Allergies      STANDING INPATIENT MEDICATIONS  albumin human  5% IVPB 250 milliLiter(s) IV Intermittent once  albumin human 25% IVPB 100 milliLiter(s) IV Intermittent every 8 hours  aMIOdarone Infusion 0.501 mG/Min IV Continuous <Continuous>  Calcium Chloride 5 Gram(s),Sodium Chloride 0.9% 400 milliLiter(s) 5 Gram(s) IV Continuous <Continuous>  caspofungin IVPB 50 milliGRAM(s) IV Intermittent every 24 hours    cefepime   IVPB 1000 milliGRAM(s) IV Intermittent every 8 hours  chlorhexidine 0.12% Liquid 15 milliLiter(s) Oral Mucosa every 12 hours  chlorhexidine 2% Cloths 1 Application(s) Topical <User Schedule>  CRRT Treatment    <Continuous>  dexMEDEtomidine Infusion 1 MICROgram(s)/kG/Hr IV Continuous <Continuous>  dextrose 50% Injectable 50 milliLiter(s) IV Push every 15 minutes  DOBUTamine Infusion 5 MICROgram(s)/kG/Min IV Continuous <Continuous>  ganciclovir IVPB 120 milliGRAM(s) IV Intermittent every 24 hours  insulin regular Infusion 1 Unit(s)/Hr IV Continuous <Continuous>  linezolid  IVPB 600 milliGRAM(s) IV Intermittent every 12 hours  metroNIDAZOLE  IVPB 500 milliGRAM(s) IV Intermittent every 12 hours  norepinephrine Infusion 0.01 MICROgram(s)/kG/Min IV Continuous <Continuous>  pantoprazole  Injectable 40 milliGRAM(s) IV Push every 12 hours  Phoxillum Filtration BK 4 / 2.5 5000 milliLiter(s) CRRT <Continuous>  Phoxillum Filtration BK 4 / 2.5 5000 milliLiter(s) CRRT <Continuous>  PrismaSATE Dialysate BGK 4 / 2.5 5000 milliLiter(s) CRRT <Continuous>  sodium bicarbonate  Injectable 50 milliEquivalent(s) IV Push once  trimethoprim / sulfamethoxazole IVPB 160 milliGRAM(s) IV Intermittent daily  vasopressin Infusion 0.01 Unit(s)/Min IV Continuous <Continuous>          VITALS/PHYSICAL EXAM  --------------------------------------------------------------------------------  T(C): 37.1 (12-10-24 @ 12:00), Max: 37.1 (12-10-24 @ 12:00)  HR: 78 (12-10-24 @ 15:45) (67 - 88)  BP: --  RR: 23 (12-10-24 @ 15:45) (18 - 29)  SpO2: 98% (12-10-24 @ 15:45) (93% - 100%)  Wt(kg): --  Height (cm): 182.9 (12-09-24 @ 10:11)  Weight (kg): 93.6 (12-09-24 @ 10:11)  BMI (kg/m2): 28 (12-09-24 @ 10:11)  BSA (m2): 2.16 (12-09-24 @ 10:11)      Guarded but stable, WN/ WG/ WD  intubated, sedated, pressors  Progressa  HEENT: NC/AT, sclera clear, mucosa moist, throat clear, trachea midline, neck supple  Respiratory: Nonlabored w/ equal chest rise, vent  Gastrointestinal: soft NT/ND (+)stoma moist, viable, functioning     abdominal incision dressing lifting - staples intact     clear serosanguinous drainage noted     inferior lateral abdominal wall ruptured blister w/ denuded skin      no odor, tenderness, increased warmth, erythema, crepitus, induration, or fluctuance  : (+) nichols, scrotal edema     Scrotum into thighs pink denuded clear yellow serous weeping   no blistering, odor, tenderness, increased warmth, erythema, crepitus, induration, or fluctuance  Neurology: sedated  Psych: difficult to assess  Musculoskeletal:  pROM, no deformities/ contractures  Vascular: BLE edema equal, no palpable DP pulses  Skin: moist w/ good turgor, anasarca   Sacrum/bilateral buttocks DIT    denuded / partial thickness skin loss w/ hyperpigmented skin in periwound   6.0cm x 4.0cm x 0.1cm, scant serosanguinous drainage, periwound erythema   scant yellow clear weeping noted on attends pad  no blistering, odor, tenderness, increased warmth, erythema, crepitus, induration, or fluctuance            LABS/ CULTURES/ RADIOLOGY:              10.6   7.71  >-----------<  38       [12-10-24 @ 15:43]              32.0     138  |  103  |  32  ----------------------------<  149      [12-10-24 @ 15:43]  4.5   |  16  |  1.07        Ca     7.9     [12-10-24 @ 15:43]      Mg     2.4     [12-10-24 @ 15:43]      Phos  2.8     [12-10-24 @ 15:43]    TPro  3.8  /  Alb  2.8  /  TBili  8.1  /  DBili  x   /  AST  55  /  ALT  183  /  AlkPhos  67  [12-10-24 @ 15:43]    PT/INR: PT 22.0 , INR 1.94       [12-10-24 @ 08:42]  PTT: 54.9       [12-10-24 @ 08:42]      CAPILLARY BLOOD GLUCOSE  POCT Blood Glucose.: 127 mg/dL (10 Dec 2024 15:05)  POCT Blood Glucose.: 127 mg/dL (10 Dec 2024 11:40)  POCT Blood Glucose.: 127 mg/dL (10 Dec 2024 10:29)  POCT Blood Glucose.: 123 mg/dL (10 Dec 2024 08:24)  POCT Blood Glucose.: 103 mg/dL (10 Dec 2024 06:04)  POCT Blood Glucose.: 95 mg/dL (10 Dec 2024 02:58)  POCT Blood Glucose.: 104 mg/dL (10 Dec 2024 01:46)  POCT Blood Glucose.: 111 mg/dL (10 Dec 2024 00:55)  POCT Blood Glucose.: 128 mg/dL (09 Dec 2024 23:51)  POCT Blood Glucose.: 141 mg/dL (09 Dec 2024 23:12)  POCT Blood Glucose.: 158 mg/dL (09 Dec 2024 21:45)      Vitamin D, 25-Hydroxy: <6.0 ng/mL (11-21-24 @ 08:32)    Culture - Blood (collected 12-07-24 @ 15:30)  Source: .Blood BLOOD  Preliminary Report (12-09-24 @ 19:01):    No growth at 48 Hours    Culture - Blood (collected 12-06-24 @ 13:30)  Source: .Blood BLOOD  Gram Stain (12-07-24 @ 03:44):    Growth in aerobic bottle: Gram Negative Rods    Growth in anaerobic bottle: Gram Negative Rods  Final Report (12-08-24 @ 13:18):    Growth in aerobic and anaerobic bottles: Escherichia coli    See previous culture [id number]    Culture - Blood (collected 12-06-24 @ 13:25)  Source: .Blood BLOOD  Gram Stain (12-07-24 @ 03:07):    Growth in aerobic and anaerobic bottles: Gram Negative Rods  Final Report (12-08-24 @ 13:17):    Growth in aerobic and anaerobic bottles: Escherichia coli    Direct identification is available within approximately 3-5    hours either by Blood Panel Multiplexed PCR or Direct    MALDI-TOF. Details: [email or web address]  Organism: Blood Culture PCR  Escherichia coli (12-08-24 @ 13:17)  Organism: Escherichia coli (12-08-24 @ 13:17)      Method Type: ESAU      -  Ampicillin: R >16 These ampicillin results predict results for amoxicillin      -  Ampicillin/Sulbactam: R >16/8      -  Aztreonam: I 8      -  Cefazolin: R >16      -  Cefepime: S <=2      -  Cefoxitin: R >16      -  Ceftriaxone: S <=1      -  Ciprofloxacin: S <=0.25      -  Ertapenem: S <=0.5      -  Gentamicin: S <=2      -  Imipenem: S <=1      -  Levofloxacin: S <=0.5      -  Meropenem: S <=1      -  Piperacillin/Tazobactam: SDD 16      -  Tobramycin: S <=2      -  Trimethoprim/Sulfamethoxazole: R >2/38  Organism: Blood Culture PCR (12-08-24 @ 13:17)      Method Type: PCR      -  Escherichia coli: Detec

## 2024-12-10 NOTE — PROGRESS NOTE ADULT - SUBJECTIVE AND OBJECTIVE BOX
[facility] DIVISION OF KIDNEY DISEASES AND HYPERTENSION -- FOLLOW UP NOTE  --------------------------------------------------------------------------------  Chief Complaint: . s/p OLT 11/15/24 with oliguric MORAIMA in setting of cardiogenic/septic shock.    24 hour events/subjective: Pt. seen and examined in CTU earlier today. Pt. is intubated, sedated on vasopressor support, and IABP pulled this afternoon.         PAST HISTORY  --------------------------------------------------------------------------------  No significant changes to PMH, PSH, FHx, SHx, unless otherwise noted    ALLERGIES & MEDICATIONS  --------------------------------------------------------------------------------  Allergies    No Known Allergies    Intolerances      Standing Inpatient Medications  albumin human 25% IVPB 100 milliLiter(s) IV Intermittent every 8 hours  aMIOdarone Infusion 0.501 mG/Min IV Continuous <Continuous>  Calcium Chloride 5 Gram(s),Sodium Chloride 0.9% 400 milliLiter(s) 5 Gram(s) IV Continuous <Continuous>  caspofungin IVPB 50 milliGRAM(s) IV Intermittent every 24 hours  cefepime   IVPB      cefepime   IVPB 1000 milliGRAM(s) IV Intermittent every 8 hours  chlorhexidine 0.12% Liquid 15 milliLiter(s) Oral Mucosa every 12 hours  chlorhexidine 2% Cloths 1 Application(s) Topical <User Schedule>  CRRT Treatment    <Continuous>  dexMEDEtomidine Infusion 1 MICROgram(s)/kG/Hr IV Continuous <Continuous>  dextrose 50% Injectable 50 milliLiter(s) IV Push every 15 minutes  DOBUTamine Infusion 5 MICROgram(s)/kG/Min IV Continuous <Continuous>  ganciclovir IVPB 120 milliGRAM(s) IV Intermittent every 24 hours  hydrocortisone sodium succinate Injectable 50 milliGRAM(s) IV Push every 8 hours  insulin regular Infusion 1 Unit(s)/Hr IV Continuous <Continuous>  linezolid  IVPB 600 milliGRAM(s) IV Intermittent every 12 hours  metroNIDAZOLE  IVPB 500 milliGRAM(s) IV Intermittent every 12 hours  norepinephrine Infusion 0.01 MICROgram(s)/kG/Min IV Continuous <Continuous>  pantoprazole  Injectable 40 milliGRAM(s) IV Push every 12 hours  Phoxillum Filtration BK 4 / 2.5 5000 milliLiter(s) CRRT <Continuous>  Phoxillum Filtration BK 4 / 2.5 5000 milliLiter(s) CRRT <Continuous>  PrismaSATE Dialysate BGK 4 / 2.5 5000 milliLiter(s) CRRT <Continuous>  trimethoprim / sulfamethoxazole IVPB 160 milliGRAM(s) IV Intermittent daily  vasopressin Infusion 0.01 Unit(s)/Min IV Continuous <Continuous>    PRN Inpatient Medications      REVIEW OF SYSTEMS  --------------------------------------------------------------------------------  Gen: No fevers/chills  Respiratory: No dyspnea, cough,   CV: No chest pain, PND, orthopnea  GI: No abdominal pain, diarrhea, constipation, nausea, vomiting  Transplant: No pain  : No increased frequency, dysuria, hematuria   MSK: No edema  Neuro: No dizziness/lightheadedness    All other systems were reviewed and are negative, except as noted.    VITALS/PHYSICAL EXAM  --------------------------------------------------------------------------------  T(C): 37.1 (12-10-24 @ 12:00), Max: 37.1 (12-10-24 @ 12:00)  HR: 75 (12-10-24 @ 14:00) (67 - 88)  BP: --  RR: 29 (12-10-24 @ 14:00) (18 - 29)  SpO2: 100% (12-10-24 @ 14:00) (93% - 100%)  Wt(kg): --  Height (cm): 182.9 (12-09-24 @ 10:11)  Weight (kg): 93.6 (12-09-24 @ 10:11)  BMI (kg/m2): 28 (12-09-24 @ 10:11)  BSA (m2): 2.16 (12-09-24 @ 10:11)      12-09-24 @ 07:01  -  12-10-24 @ 07:00  --------------------------------------------------------  IN: 2759.5 mL / OUT: 3793 mL / NET: -1033.5 mL    12-10-24 @ 07:01  -  12-10-24 @ 15:19  --------------------------------------------------------  IN: 1195.7 mL / OUT: 892 mL / NET: 303.7 mL      Physical Exam:  Gen: critically ill, intubated and sedated.   Pulm: \intubated.   CV: tachycardic, S1S2+  Abd: + distended. +incisions. +NGT in place.   : +nichols catheter with dark colored urine  MSK: 2+ pitting above knee including scrotum  Skin: Warm  Access: R IJ non-tunneled HD catheter     LABS/STUDIES  --------------------------------------------------------------------------------              10.4   6.43  >-----------<  22       [12-10-24 @ 08:42]              31.1     140  |  103  |  32  ----------------------------<  131      [12-10-24 @ 08:46]  4.3   |  17  |  1.10        Ca     7.7     [12-10-24 @ 08:46]      Mg     2.3     [12-10-24 @ 08:46]      Phos  2.1     [12-10-24 @ 08:46]    TPro  3.7  /  Alb  3.0  /  TBili  7.8  /  DBili  x   /  AST  65  /  ALT  231  /  AlkPhos  64  [12-10-24 @ 08:46]    PT/INR: PT 22.0 , INR 1.94       [12-10-24 @ 08:42]  PTT: 54.9       [12-10-24 @ 08:42]          [12-10-24 @ 00:29]    Creatinine Trend:  SCr 1.10 [12-10 @ 08:46]  SCr 1.16 [12-10 @ 04:39]  SCr 1.25 [12-10 @ 00:29]  SCr 1.29 [12-09 @ 20:11]  SCr 1.44 [12-09 @ 15:05]    Vitamin D (25OH) <6.0      [11-21-24 @ 08:32]  TSH 1.59      [11-21-24 @ 08:32]  Lipid: chol 114, , HDL 47, LDL --      [04-24-24 @ 06:48]    HBsAb 41.9      [11-15-24 @ 00:41]  HBsAg Nonreact      [11-15-24 @ 00:41]  HBcAb Nonreact      [11-15-24 @ 00:41]  HCV 0.06, Nonreact      [11-15-24 @ 00:41]  HIV Nonreact      [11-15-24 @ 00:41]

## 2024-12-10 NOTE — PROGRESS NOTE ADULT - NS ATTEND AMEND GEN_ALL_CORE FT
POD#1 s/p creation ileostomy and closure of abdomen; POD# 25 s/p OLT  LFT's slowly improving, TBili lagging  remains intubated on dobutamine with aortic balloon pump. Pressors weaned off  f/u echo; Aortic balloon pump to be removed as per CTICU/CT Surgery  Immunosuppression - Prograf and cellcept held. Cont stress dose hydrocortisone  cont NPO until ostomy functioning  remains critically ill. cont abx as per ID, f/u cultures and pathology

## 2024-12-10 NOTE — PROGRESS NOTE ADULT - NS ATTEND AMEND GEN_ALL_CORE FT
Pt seen and examined with ACP.  Assessment and plan reviewed and discussed.  Agree with above.    Status of wounds and treatment recommendations d/w  pt's son at bedside.  All questions answered.   Son expressed understanding.    I spent 50  minutes face to face w/ this pt of which more than 50% of the time was spent counseling & coordinating care of this pt.

## 2024-12-10 NOTE — PROGRESS NOTE ADULT - SUBJECTIVE AND OBJECTIVE BOX
Subjective: remains intubated. abdomen closed yesterday.     Medications:  albumin human 25% IVPB 100 milliLiter(s) IV Intermittent every 8 hours  aMIOdarone Infusion 0.501 mG/Min IV Continuous <Continuous>  Calcium Chloride 5 Gram(s),Sodium Chloride 0.9% 400 milliLiter(s) 5 Gram(s) IV Continuous <Continuous>  caspofungin IVPB 50 milliGRAM(s) IV Intermittent every 24 hours  cefepime   IVPB      cefepime   IVPB 1000 milliGRAM(s) IV Intermittent every 8 hours  chlorhexidine 0.12% Liquid 15 milliLiter(s) Oral Mucosa every 12 hours  chlorhexidine 2% Cloths 1 Application(s) Topical <User Schedule>  CRRT Treatment    <Continuous>  dexMEDEtomidine Infusion 1 MICROgram(s)/kG/Hr IV Continuous <Continuous>  dextrose 50% Injectable 50 milliLiter(s) IV Push every 15 minutes  DOBUTamine Infusion 5 MICROgram(s)/kG/Min IV Continuous <Continuous>  ganciclovir IVPB 120 milliGRAM(s) IV Intermittent every 24 hours  insulin regular Infusion 1 Unit(s)/Hr IV Continuous <Continuous>  linezolid  IVPB 600 milliGRAM(s) IV Intermittent every 12 hours  metroNIDAZOLE  IVPB 500 milliGRAM(s) IV Intermittent every 12 hours  norepinephrine Infusion 0.01 MICROgram(s)/kG/Min IV Continuous <Continuous>  pantoprazole  Injectable 40 milliGRAM(s) IV Push every 12 hours  Phoxillum Filtration BK 4 / 2.5 5000 milliLiter(s) CRRT <Continuous>  Phoxillum Filtration BK 4 / 2.5 5000 milliLiter(s) CRRT <Continuous>  PrismaSATE Dialysate BGK 4 / 2.5 5000 milliLiter(s) CRRT <Continuous>  sodium bicarbonate  Injectable 50 milliEquivalent(s) IV Push once  trimethoprim / sulfamethoxazole IVPB 160 milliGRAM(s) IV Intermittent daily  vasopressin Infusion 0.01 Unit(s)/Min IV Continuous <Continuous>    Vitals:  T(C): 37.1 (12-10-24 @ 12:00), Max: 37.1 (12-10-24 @ 12:00)  HR: 82 (12-10-24 @ 16:35) (67 - 88)  BP: --  BP(mean): --  RR: 23 (12-10-24 @ 15:45) (18 - 29)  SpO2: 97% (12-10-24 @ 16:35) (93% - 100%)    Telemetry:    Daily     Daily Weight in k (10 Dec 2024 00:00)  Weight (kg): 93.6 (24 @ 10:11)    I&O's Summary    09 Dec 2024 07:01  -  10 Dec 2024 07:00  --------------------------------------------------------  IN: 2759.5 mL / OUT: 3793 mL / NET: -1033.5 mL    10 Dec 2024 07:01  -  10 Dec 2024 16:46  --------------------------------------------------------  IN: 1692.3 mL / OUT: 1466 mL / NET: 226.3 mL        Physical Exam:  Appearance: No Acute Distress, intubated and sedated  Cardiovascular: RRR, Normal S1 S2, No murmurs/rubs/gallops  Respiratory: Clear to auscultation bilaterally  Gastrointestinal: soft, non-tender, non-distended	  Skin: no skin lesions  Neurologic: Non-focal  Extremities: No LE edema, warm and well perfused  Psychiatry: A & O x 3, Mood & affect appropriate      Labs:                        10.6   7.71  )-----------( 38       ( 10 Dec 2024 15:43 )             32.0     12-10    138  |  103  |  32[H]  ----------------------------<  149[H]  4.5   |  16[L]  |  1.07    Ca    7.9[L]      10 Dec 2024 15:43  Phos  2.8     12-10  Mg     2.4     12-10    TPro  3.8[L]  /  Alb  2.8[L]  /  TBili  8.1[H]  /  DBili  x   /  AST  55[H]  /  ALT  183[H]  /  AlkPhos  67  12-10      PT/INR - ( 10 Dec 2024 08:42 )   PT: 22.0 sec;   INR: 1.94 ratio         PTT - ( 10 Dec 2024 08:42 )  PTT:54.9 sec        Oxygen Saturation, Mixed: 71.3 (12-10 @ 15:10)  Oxygen Saturation, Mixed: 75.3 (12-10 @ 08:27)  Oxygen Saturation, Mixed: 82.1 (12-10 @ 03:59)  Oxygen Saturation, Mixed: 85.6 ( @ 23:56)  Oxygen Saturation, Mixed: 85.4 ( @ 20:00)  Oxygen Saturation, Mixed: 91.1 ( @ 17:30)  Oxygen Saturation, Mixed: 83.6 ( @ 14:45)  Oxygen Saturation, Mixed: 78.8 ( @ 10:00)  Oxygen Saturation, Mixed: 76.2 ( @ 08:25)  Oxygen Saturation, Mixed: 75.3 ( @ 06:11)  Oxygen Saturation, Mixed: 75.6 ( @ 03:59)  Oxygen Saturation, Mixed: 76.1 ( @ 02:00)  Oxygen Saturation, Mixed: 79.0 ( @ 00:06)  Oxygen Saturation, Mixed: 73.6 ( @ 22:00)  Oxygen Saturation, Mixed: 66.6 ( @ 20:05)  Oxygen Saturation, Mixed: 71.5 ( @ 17:58)  Oxygen Saturation, Mixed: 71.6 ( @ 14:30)  Oxygen Saturation, Mixed: 76.4 ( @ 13:18)  Oxygen Saturation, Mixed: 79.3 ( @ 11:11)  Oxygen Saturation, Mixed: 75.3 ( @ 06:35)  Oxygen Saturation, Mixed: 90.2 ( @ 03:36)  Oxygen Saturation, Mixed: 86.1 ( @ 00:55)  Oxygen Saturation, Mixed: 82.8 ( @ 21:50)  Oxygen Saturation, Mixed: 86.6 ( @ 18:04)    Lactate Dehydrogenase, Serum: 231 U/L (12-10 @ 00:29)  Lactate Dehydrogenase, Serum: 241 U/L ( @ 00:32)  Lactate Dehydrogenase, Serum: 288 U/L ( @ 18:30)  Lactate Dehydrogenase, Serum: 375 U/L ( @ 12:13)    Blood Gas Arterial, Lactate: 8.6 mmol/L (12-10 @ 15:10)  Blood Gas Arterial, Lactate: 7.5 mmol/L (12-10 @ 07:25)  Blood Gas Arterial, Lactate: 8.1 mmol/L (12-10 @ 03:59)  Blood Gas Arterial, Lactate: 8.8 mmol/L ( @ 23:56)  Blood Gas Arterial, Lactate: 8.7 mmol/L ( @ 20:00)  Blood Gas Arterial, Lactate: 9.1 mmol/L ( @ 17:30)  Blood Gas Arterial, Lactate: 9.3 mmol/L ( @ 14:45)  Blood Gas Arterial, Lactate: 9.5 mmol/L ( @ 13:36)  Blood Gas Arterial, Lactate: 9.1 mmol/L ( @ 12:52)  Blood Gas Arterial, Lactate: 9.3 mmol/L ( @ 12:17)  Blood Gas Arterial, Lactate: 9.4 mmol/L ( @ 10:00)  Blood Gas Arterial, Lactate: 8.5 mmol/L ( @ 08:25)  Blood Gas Arterial, Lactate: 8.4 mmol/L ( @ 06:11)  Blood Gas Arterial, Lactate: 8.2 mmol/L ( @ 03:59)  Blood Gas Arterial, Lactate: 8.5 mmol/L ( @ 02:00)  Blood Gas Arterial, Lactate: 9.2 mmol/L ( @ 00:06)  Blood Gas Arterial, Lactate: 8.6 mmol/L ( @ 22:00)  Blood Gas Arterial, Lactate: 8.9 mmol/L ( @ 20:05)  Blood Gas Arterial, Lactate: 8.4 mmol/L ( @ 17:58)  Blood Gas Arterial, Lactate: 8.0 mmol/L ( @ 14:30)  Blood Gas Arterial, Lactate: 8.2 mmol/L ( @ 13:18)  Blood Gas Arterial, Lactate: 8.5 mmol/L ( @ 11:11)  Blood Gas Arterial, Lactate: 8.9 mmol/L ( @ 08:05)  Blood Gas Arterial, Lactate: 9.8 mmol/L ( @ 06:35)  Blood Gas Arterial, Lactate: 5.9 mmol/L ( @ 06:00)  Blood Gas Arterial, Lactate: 8.9 mmol/L ( @ 05:15)  Blood Gas Arterial, Lactate: 10.4 mmol/L ( @ 03:36)  Blood Gas Arterial, Lactate: 10.9 mmol/L ( @ 00:55)  Blood Gas Arterial, Lactate: 13.1 mmol/L ( @ 21:50)  Blood Gas Arterial, Lactate: 11.8 mmol/L ( @ 19:13)  Blood Gas Arterial, Lactate: 13.0 mmol/L ( @ 18:04)

## 2024-12-10 NOTE — PROGRESS NOTE ADULT - ASSESSMENT
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 Pt underwent Y90 Sept, 2023 with favorable treatment response.s/p OLT 11/15/24     on 12/7/24 s/p Total abdominal colectomy with ileostomy  on 12/9 s/p ex-lap with ileostomy.    # Ileus/ischemic bowel  ileus for >1 week- Cdiff neg 12/1  S/p rectal decompression  # severe septic shock 12/6 and pancytopenia, lactic acidosis  # Ecoli bacteremia in context of above  received meropenem, linezolid/caspofungin + tobramycin x1 on 11/6  BC 12/6 positive  s/p colectomy on 12/6- noted dusky appearance- plan to go back to OR  # cardiogenic shock  on dobutamine- s/p impella  # LLL mucus plug  --Continue Cefepime 1g IV Q8H and Metronidazole 500 mg IV Q12H  --Deescalate Caspofungin to Fluconazole  --Stop Linezolid  --Continue to follow CBC with diff  --Continue to follow temperature curve  --Follow up on preliminary blood cultures    #s/p OLT 11/15/24 CMV D?R? EBV , toxo D?/R?  --Thrombocytopenia is noted - favor continuing GCV and Bactrim for PPx for now. Anticipate thrombocytopenia likely exacerbated by impella which is planned for removal  --Continue Ganciclovir 120 mg IV Q24  --Continue equivalent of SS Bactrim Q24H for PCP PPx    #Fever, sepsis, hypoxic respiratory failure s/p on mechanical ventilation  CT C/A/P Bilateral lower lobe consolidation with fluid and debris in the right lower lobe bronchi, concerning for aspiration pneumonia.  CMV PCR (11/24) 146 (low level CMV viremia - not likely contributing)  Adenovirus PCR (11/24) Negative  Cryptococcal Serum Ag (11/24) Negative  serum and bronch aspergillus galactomannan negative  WNV Serology and Serum PCR Negative  CT Chest (11/25) Groundglass opacities in the right lower and left upper lobes, possibly infectious in nature.  CT A/P (11/25) No acute process  s/p Meropenem 1 gram q 8hr (11/23 >11/29)    I will continue to follow. Please feel free to contact me with any further questions.    Kyle Junior M.D.  [facility] Division of Infectious Disease  8AM-5PM Monday - Friday: Available on [employer] Teams  After Hours and Holidays (or if no response on [employer] Teams): Please contact the Infectious Diseases Office at [phone]    The above assessment and plan were discussed with Kimberly, transplant surgery PA

## 2024-12-10 NOTE — PROGRESS NOTE ADULT - SUBJECTIVE AND OBJECTIVE BOX
HPI:  Patient seen and examined at bedside in CTU.  TTE performed prior to planned IABP removal.  Lactate remains elevated. So does cardiac output.    Review Of Systems:   Unable to assess while intubated, sedated        Medications:  albumin human 25% IVPB 100 milliLiter(s) IV Intermittent every 8 hours  aMIOdarone Infusion 0.501 mG/Min IV Continuous <Continuous>  Calcium Chloride 5 Gram(s),Sodium Chloride 0.9% 400 milliLiter(s) 5 Gram(s) IV Continuous <Continuous>  caspofungin IVPB 50 milliGRAM(s) IV Intermittent every 24 hours  cefepime   IVPB      cefepime   IVPB 1000 milliGRAM(s) IV Intermittent every 8 hours  chlorhexidine 0.12% Liquid 15 milliLiter(s) Oral Mucosa every 12 hours  chlorhexidine 2% Cloths 1 Application(s) Topical <User Schedule>  CRRT Treatment    <Continuous>  dexMEDEtomidine Infusion 1 MICROgram(s)/kG/Hr IV Continuous <Continuous>  dextrose 50% Injectable 50 milliLiter(s) IV Push every 15 minutes  DOBUTamine Infusion 5 MICROgram(s)/kG/Min IV Continuous <Continuous>  ganciclovir IVPB 120 milliGRAM(s) IV Intermittent every 24 hours  insulin regular Infusion 1 Unit(s)/Hr IV Continuous <Continuous>  linezolid  IVPB 600 milliGRAM(s) IV Intermittent every 12 hours  metroNIDAZOLE  IVPB 500 milliGRAM(s) IV Intermittent every 12 hours  norepinephrine Infusion 0.01 MICROgram(s)/kG/Min IV Continuous <Continuous>  pantoprazole  Injectable 40 milliGRAM(s) IV Push every 12 hours  Phoxillum Filtration BK 4 / 2.5 5000 milliLiter(s) CRRT <Continuous>  Phoxillum Filtration BK 4 / 2.5 5000 milliLiter(s) CRRT <Continuous>  PrismaSATE Dialysate BGK 4 / 2.5 5000 milliLiter(s) CRRT <Continuous>  thiamine Injectable 100 milliGRAM(s) IV Push daily  trimethoprim / sulfamethoxazole IVPB 160 milliGRAM(s) IV Intermittent daily  vasopressin Infusion 0.01 Unit(s)/Min IV Continuous <Continuous>    PAST MEDICAL & SURGICAL HISTORY:  Diabetes      Transaminitis      Paroxysmal atrial fibrillation      Depression      BPH (benign prostatic hyperplasia)      Hypertension      Chronic atrial fibrillation      Coronary artery disease      Hepatocellular carcinoma      DM (diabetes mellitus)      HTN (hypertension)      Paroxysmal atrial fibrillation      Cirrhosis      HCC (hepatocellular carcinoma)      History of BPH      History of laparoscopic cholecystectomy      History of lumbar laminectomy      H/O prior ablation treatment      H/O percutaneous left heart catheterization        Vitals:  T(C): 37.5 (12-10-24 @ 20:00), Max: 37.5 (12-10-24 @ 16:00)  HR: 77 (12-10-24 @ 23:45) (67 - 88)  BP: --  BP(mean): --  RR: 26 (12-10-24 @ 23:45) (18 - 29)  SpO2: 97% (12-10-24 @ 23:45) (93% - 100%)  Wt(kg): --  Daily     Daily Weight in k (10 Dec 2024 00:00)  I&O's Summary    09 Dec 2024 07:01  -  10 Dec 2024 07:00  --------------------------------------------------------  IN: 2759.5 mL / OUT: 3793 mL / NET: -1033.5 mL    10 Dec 2024 07:01  -  10 Dec 2024 23:54  --------------------------------------------------------  IN: 2862.4 mL / OUT: 2753 mL / NET: 109.4 mL        Physical Exam:  Appearance: Intubated, critically ill   Eyes: PERRL, EOMI, pink conjunctiva  HENT: +ET tube, +NG tube  Cardiovascular: RRR, S1, S2, no murmurs, rubs, or gallops; no edema; no JVD  Respiratory: ventilator support  Gastrointestinal: soft, non-tender, non-distended with normal bowel sounds  Musculoskeletal: No clubbing; no joint deformity                             10.6   7.71  )-----------( 38       ( 10 Dec 2024 15:43 )             32.0     12-10    138  |  103  |  32[H]  ----------------------------<  149[H]  4.5   |  16[L]  |  1.07    Ca    7.9[L]      10 Dec 2024 15:43  Phos  2.8     12-10  Mg     2.4     12-10    TPro  3.8[L]  /  Alb  2.8[L]  /  TBili  8.1[H]  /  DBili  x   /  AST  55[H]  /  ALT  183[H]  /  AlkPhos  67  12-10    PT/INR - ( 10 Dec 2024 15:43 )   PT: 20.9 sec;   INR: 1.84 ratio         PTT - ( 10 Dec 2024 15:43 )  PTT:46.3 sec        Cardiovascular Diagnostic Testing:  ECG: sinus rhythm    Echo: < from: Intra-Operative Transesophageal Echo W or WO Ultrasound Enhancing Agent (11.15.24 @ 07:46) >  --------------------------------------------------------------------------------  PRE-BYPASS FINDINGS     Left Ventricle:  Left ventricular ejection fraction is estimated at 60 to 65%. Normal left ventricularwall thickness. The left ventricular systolic function is normal There are no regional wall motion abnormalities seen.     Right Ventricle:  The right ventricular cavity is normal in size, with normal wall thickness and right ventricular systolic function is normal. Right ventricular systolic function is normal.     Left Atrium:  The left atrium is normal in size. No thrombus visualized in left atrial appendage.     Right Atrium:  The right atrium is normal in size. The right atrium is normal in size.     Interatrial Septum:  No PFO visualized with color flow doppler.     Aortic Valve:  The aortic valve appears trileaflet. There is no aortic valve stenosis. There is trace aortic regurgitation.     Mitral Valve:  There is no mitral valve stenosis. There is no mitral valve stenosis. There is trace mitral regurgitation.     Tricuspid Valve:  There is no evidence of tricuspid stenosis. There is trace tricuspid regurgitation.     Pericardium:  No pericardial effusion seen.     Post-Bypass:  S/P OLT. Under current loading conditions, hyperdynamic left ventricular systolic function, EF: 70-75% by visual estimate, no RWMA. Normal right ventricular systolic function. All other findings unchanged. Probe removed atraumatically, no blood. The patient tolerated the procedure well and without complications. Permanent recorded images are stored in the medical record.     Electronically signed by James Villareal on 11/15/2024 at 2:18:16 PM         *** Final ***    < end of copied text >      Stress Testing:     Cath: 2024, patient had his LHC repeated with Ben Villareal MD at [facility].  Cath showed multivessel coronary artery disease, but the lesions previously noted were FFR negative.  He continues to have MIRTA 3 flow throughout.    Interpretation of Telemetry: Sinus     Imaging:

## 2024-12-10 NOTE — PROGRESS NOTE ADULT - SUBJECTIVE AND OBJECTIVE BOX
Subjective  Patient remains intubated with abdomen open left in discontinuity.      Objective    Vital signs  T(F): , Max: 99.5 (12-09-24 @ 14:30)  HR: 80 (12-10-24 @ 06:45)  BP: 128/38 (12-09-24 @ 10:11)  SpO2: 98% (12-10-24 @ 06:45)  Wt(kg): --    Output     OUT:    Bulb (mL): 2015 mL    Bulb (mL): 850 mL    Indwelling Catheter - Urethral (mL): 105 mL    VAC (Vacuum Assisted Closure) System (mL): 200 mL  Total OUT: 3170 mL    Total NET: -3170 mL          Gen: NAD  : Scrotum with significant distension and scrotal skin is denued. Scrotum is erythematous and raw but no evidence of crepitus.     Labs      12-10 @ 04:39    WBC 7.43  / Hct 32.1  / SCr 1.16     12-10 @ 00:29    WBC 6.08  / Hct 30.8  / SCr 1.25         Culture - Blood (collected 12-07-24 @ 15:30)  Source: .Blood BLOOD  Preliminary Report (12-09-24 @ 19:01):    No growth at 48 Hours    Culture - Body Fluid with Gram Stain (collected 12-07-24 @ 11:18)  Source: Body Fluid  Gram Stain (12-07-24 @ 18:22):    polymorphonuclear leukocytes seen    No organisms seen    by cytocentrifuge  Preliminary Report (12-08-24 @ 10:36):    No growth to date.    Culture - Blood (collected 12-06-24 @ 13:30)  Source: .Blood BLOOD  Gram Stain (12-07-24 @ 03:44):    Growth in aerobic bottle: Gram Negative Rods    Growth in anaerobic bottle: Gram Negative Rods  Final Report (12-08-24 @ 13:18):    Growth in aerobic and anaerobic bottles: Escherichia coli    See previous culture [id number]    Culture - Blood (collected 12-06-24 @ 13:25)  Source: .Blood BLOOD  Gram Stain (12-07-24 @ 03:07):    Growth in aerobic and anaerobic bottles: Gram Negative Rods  Final Report (12-08-24 @ 13:17):    Growth in aerobic and anaerobic bottles: Escherichia coli    Direct identification is available within approximately 3-5    hours either by Blood Panel Multiplexed PCR or Direct    MALDI-TOF. Details: [email or web address]  Organism: Blood Culture PCR  Escherichia coli (12-08-24 @ 13:17)  Organism: Escherichia coli (12-08-24 @ 13:17)      Method Type: ESAU      -  Ampicillin: R >16 These ampicillin results predict results for amoxicillin      -  Ampicillin/Sulbactam: R >16/8      -  Aztreonam: I 8      -  Cefazolin: R >16      -  Cefepime: S <=2      -  Cefoxitin: R >16      -  Ceftriaxone: S <=1      -  Ciprofloxacin: S <=0.25      -  Ertapenem: S <=0.5      -  Gentamicin: S <=2      -  Imipenem: S <=1      -  Levofloxacin: S <=0.5      -  Meropenem: S <=1      -  Piperacillin/Tazobactam: SDD 16      -  Tobramycin: S <=2      -  Trimethoprim/Sulfamethoxazole: R >2/38  Organism: Blood Culture PCR (12-08-24 @ 13:17)      Method Type: PCR      -  Escherichia coli: Detec    Culture - Blood (collected 12-05-24 @ 14:15)  Source: .Blood BLOOD  Preliminary Report (12-09-24 @ 18:01):    No growth at 4 days    Culture - Blood (collected 12-05-24 @ 14:00)  Source: .Blood BLOOD  Preliminary Report (12-09-24 @ 18:01):    No growth at 4 days        Urine Cx: ?  Blood Cx: ?    Imaging

## 2024-12-10 NOTE — PROGRESS NOTE ADULT - SUBJECTIVE AND OBJECTIVE BOX
Patient seen and examined at the bedside.    Remains critically ill on continuous ICU monitoring.    Brief Summary:  73 yo M s/p recent liver transplant now with Cardiogenic and Septic shock s/p IABP insertion, total abdominal colectomy on 12/7/2024  Abdomen remains open and bowel is in discontinuity.    24 Hour events:  Lactate plateaued in 8s.  Dobutamine weaned to 3 yesterday, increased back to 5 overnight.  Platelet count remains low.  OR yesterday for abdominal re-exploration.    Objective:  Vital Signs Last 24 Hrs  T(C): 36.6 (10 Dec 2024 04:00), Max: 37.5 (09 Dec 2024 14:30)  T(F): 97.9 (10 Dec 2024 04:00), Max: 99.5 (09 Dec 2024 14:30)  HR: 80 (10 Dec 2024 06:45) (69 - 93)  BP: 128/38 (09 Dec 2024 10:11) (128/38 - 128/38)  BP(mean): 60 (09 Dec 2024 10:11) (60 - 60)  RR: 18 (10 Dec 2024 06:45) (17 - 25)  SpO2: 98% (10 Dec 2024 06:45) (98% - 100%)    Parameters below as of 10 Dec 2024 04:00  Patient On (Oxygen Delivery Method): ventilator    O2 Concentration (%): 40    Mode: AC/ CMV (Assist Control/ Continuous Mandatory Ventilation)  RR (machine): 18  TV (machine): 500  FiO2: 40  PEEP: 5  ITime: 1  MAP: 8  PIP: 15    Physical Exam:  General: Intubated  Neurology: Sedated but follows commands when sedation is off.  Respiratory: Bilateral breath sounds  CV: Sinus  Abdominal: Soft, Nontender  Extremities: Warm, well-perfused  Castro  -------------------------------------------------------------------------------------------------------------------------------    Labs:                        10.6   7.43  )-----------( 24       ( 10 Dec 2024 04:39 )             32.1     12-10    141  |  104  |  35[H]  ----------------------------<  96  4.2   |  19[L]  |  1.16    Ca    7.9[L]      10 Dec 2024 04:39  Phos  1.7     12-10  Mg     2.3     12-10    TPro  3.8[L]  /  Alb  2.7[L]  /  TBili  7.4[H]  /  DBili  4.4[H]  /  AST  85[H]  /  ALT  264[H]  /  AlkPhos  70  12-10    LIVER FUNCTIONS - ( 10 Dec 2024 04:39 )  Alb: 2.7 g/dL / Pro: 3.8 g/dL / ALK PHOS: 70 U/L / ALT: 264 U/L / AST: 85 U/L / GGT: x           PT/INR - ( 10 Dec 2024 04:39 )   PT: 21.1 sec;   INR: 1.85 ratio         PTT - ( 10 Dec 2024 04:39 )  PTT:50.5 sec  ABG - ( 10 Dec 2024 03:59 )  pH, Arterial: 7.39  pH, Blood: x     /  pCO2: 34    /  pO2: 182   / HCO3: 21    / Base Excess: -3.7  /  SaO2: 99.6    ------------------------------------------------------------------------------------------------------------------------------  Assessment:  73 yo M with cardiogenic shock s/p IABP insertion, total abdominal colectomy with ileostomy on 12/7/2024.     Cardiogenic shock  Hypovolemia  Lactic acidosis  Acute respiratory failure  Acute liver injury, transaminitis, hyperbilirubinemia  Acute kidney injury  Hypokalemia  Hypocalcemia  Acute blood loss anemia  Thrombocytopenia  E coli bacteremia    Plan:  ***Neuro***  Sedated with Precedex for comfort/vent synchrony.     ***Cardiovascular***  At high risk for ongoing hemodynamic instability and cardiac arrhythmias.  Remains on Dobutamine - trend cardiac index and mixed venous saturation.  Trend Lactate  R. Fem IABP augmenting 1:1  AYDEN in OR yesterday.  A fib - aggressive repletion of electrolytes, on PO Amiodarone.  Jacqui 5 ppm - Wean to off  Wean Vasopressin for MAP > 65 mm Hg.  Albumin / IVF/ FFP for hypovolemia - monitor VAC outputs.    ***Pulmonary***  Postoperative acute respiratory failure  Remains on vent support.  May benefit from early tracheostomy    ***GI***  s/p liver transplant with acute liver injury from sepsis - trend LFTs.  s/p total abdominal colectomy  NPO for now.  Remains in discontinuity.  VAC with high output but thin drainage - ongoing resuscitation to keep I/O close to even.  Return to OR yesterday for re-exploration, possible stoma, possible closure.  Protonix for stress ulcer prophylaxis.     ***Renal***  MORAIMA - on CRRT.  Castro catheter for strict I/O measurements.  Volume removal after OR, will supplement with concentrated Albumin.   Hypokalemia repleted.  Hypocalcemia - Calcium infusion, trend levels, may be able to stop after OR.    ***ID***  Merrem for e coli bacteremia - will discuss possible narrowing with ID.  Sepsis coverage with Caspofungin and Linezolid.  CMV prophylaxis with Ganciclovir.  Trend leukopenia s/p Filgrastin.  Bactrim for prophylaxis.    ***Endocrine***  Monitor blood sugars, Insulin if needed.    ***Hematology***  Acute blood loss anemia and thrombocytopenia.  Trend CBC and monitor for bleeding.  Transfused platelets yesterday morning prior to OR.        Care plan discussed with the ICU care team.   Patient remains critical, at risk for life threatening decompensation.    I have spent 30 minutes providing critical care management to this patient.    By signing my name below, IWillian, attest that this documentation has been prepared under the direction and in the presence of Eloisa Wesley MD.  Electronically signed: Willian Montes De Oca, 12-10-24 @ 07:22    Eloisa REY,  personally performed the services described in this documentation. All medical record entries made by the scribe were at my direction and in my presence. I have reviewed the chart and agree that the record reflects my personal performance and is accurate and complete  Electronically signed: Eloisa Wesley MD. Patient seen and examined at the bedside.    Remains critically ill on continuous ICU monitoring.    Brief Summary:  75 yo M s/p recent liver transplant now with Cardiogenic and Septic shock s/p IABP insertion, total abdominal colectomy on 12/7/2024  Abdomen remains open and bowel is in discontinuity.    24 Hour events:  Lactate plateaued in 8s.  Dobutamine weaned to 3 yesterday, increased back to 5 overnight.  Platelet count remains low.  OR yesterday for abdominal re-exploration.    Objective:  Vital Signs Last 24 Hrs  T(C): 36.6 (10 Dec 2024 04:00), Max: 37.5 (09 Dec 2024 14:30)  T(F): 97.9 (10 Dec 2024 04:00), Max: 99.5 (09 Dec 2024 14:30)  HR: 80 (10 Dec 2024 06:45) (69 - 93)  BP: 128/38 (09 Dec 2024 10:11) (128/38 - 128/38)  BP(mean): 60 (09 Dec 2024 10:11) (60 - 60)  RR: 18 (10 Dec 2024 06:45) (17 - 25)  SpO2: 98% (10 Dec 2024 06:45) (98% - 100%)    Parameters below as of 10 Dec 2024 04:00  Patient On (Oxygen Delivery Method): ventilator    O2 Concentration (%): 40    Mode: AC/ CMV (Assist Control/ Continuous Mandatory Ventilation)  RR (machine): 18  TV (machine): 500  FiO2: 40  PEEP: 5  ITime: 1  MAP: 8  PIP: 15    Physical Exam:  General: Intubated  Neurology: Sedated but follows commands when sedation is off.  Respiratory: Bilateral breath sounds  CV: Sinus  Abdominal: Soft, JPs with thin serous drainage.  Stoma pink, edematous, no significant output.  Extremities: Warm, well-perfused  Castro  -------------------------------------------------------------------------------------------------------------------------------    Labs:                        10.6   7.43  )-----------( 24       ( 10 Dec 2024 04:39 )             32.1     12-10    141  |  104  |  35[H]  ----------------------------<  96  4.2   |  19[L]  |  1.16    Ca    7.9[L]      10 Dec 2024 04:39  Phos  1.7     12-10  Mg     2.3     12-10    TPro  3.8[L]  /  Alb  2.7[L]  /  TBili  7.4[H]  /  DBili  4.4[H]  /  AST  85[H]  /  ALT  264[H]  /  AlkPhos  70  12-10    LIVER FUNCTIONS - ( 10 Dec 2024 04:39 )  Alb: 2.7 g/dL / Pro: 3.8 g/dL / ALK PHOS: 70 U/L / ALT: 264 U/L / AST: 85 U/L / GGT: x           PT/INR - ( 10 Dec 2024 04:39 )   PT: 21.1 sec;   INR: 1.85 ratio         PTT - ( 10 Dec 2024 04:39 )  PTT:50.5 sec  ABG - ( 10 Dec 2024 03:59 )  pH, Arterial: 7.39  pH, Blood: x     /  pCO2: 34    /  pO2: 182   / HCO3: 21    / Base Excess: -3.7  /  SaO2: 99.6    ------------------------------------------------------------------------------------------------------------------------------  Assessment:  75 yo M with cardiogenic shock s/p IABP insertion, total abdominal colectomy with ileostomy on 12/7/2024.     Cardiogenic shock  Lactic acidosis  Acute respiratory failure  Hyperbilirubinemia  Acute kidney injury  Hypophosphatemia  Thrombocytopenia  E coli bacteremia    Plan:  ***Neuro***  Sedated with Precedex for comfort/vent synchrony.     ***Cardiovascular***  At high risk for ongoing hemodynamic instability and cardiac arrhythmias.  Remains on Dobutamine - trend cardiac index and mixed venous saturation.  Trend Lactate  Plan to remove IABP today.  A fib - aggressive repletion of electrolytes, on Amiodarone.  Jacqui increased to 10 ppm  Wean Norepinephrine and Vasopressin for MAP > 65 mm Hg.  Albumin / IVF/ FFP for hypovolemia - monitor VAC outputs.    ***Pulmonary***  Acute respiratory failure  Remains on vent support.    ***GI***  s/p liver transplant with acute liver injury from sepsis - trend LFTs and bilirubin.  s/p total abdominal colectomy  NPO for now. Await return of bowel function.  Protonix for stress ulcer prophylaxis.     ***Renal***  MORAIMA - on CRRT.  Castro catheter for strict I/O measurements.  Volume removal after OR, will supplement with concentrated Albumin.   Hypocalcemia - Calcium infusion, trend levels.    ***ID***  Cefepime for e coli bacteremia - will discuss possible narrowing of other coverage with ID.  Follow up repeat cultures.  Sepsis coverage with Caspofungin and Linezolid.  CMV prophylaxis with Bactrim and Ganciclovir.  Trend leukopenia s/p Filgrastin.    ***Endocrine***  Monitor blood sugars, Insulin if needed.    ***Hematology***  Acute blood loss anemia and thrombocytopenia.  Trend CBC and monitor for bleeding.  Transfuse platelets prior to IABP removal.        Care plan discussed with the ICU care team.   Patient remains critical, at risk for life threatening decompensation.    I have spent 50 minutes providing critical care management to this patient. This note was finalized on 10/11/24 but reflects care provided on 10/10/24.    By signing my name below, I, Willian Montes De Oca, attest that this documentation has been prepared under the direction and in the presence of Eloisa Wesley MD.  Electronically signed: Willian Montes De Oca, 12-10-24 @ 07:22    I, Eloisa Wesley,  personally performed the services described in this documentation. All medical record entries made by the scribe were at my direction and in my presence. I have reviewed the chart and agree that the record reflects my personal performance and is accurate and complete  Electronically signed: Eloisa Wesley MD.

## 2024-12-10 NOTE — PROGRESS NOTE ADULT - ASSESSMENT
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     s/p OLT 11/15 with complicated post op course    [] Septic shock/Cardiogenic shock  [] s/p Colectomy 12/7 POD#3  [] RTOR for closure and Ileostomy creation   [] IAPB 12/7: CTICU on board  - E coli Bacteremia (12/6) - on jeniffer/linezoid/ caspo. Received Tobra x 1 12/6    - Neutropenia: received Neupogen 480mcg x2  - MORAIMA: CRRT started 12/7, low u/o  - f/u CMV PCR  - trend lactate    [] s/p OLT POD #25   - trend LFT's  - Diet: NPO, IVF  - Pain management: avoid narcotics  - Bowel regimen  - Strict I&Os, nichols, wound vac  - US: L brachial DVT   - daily PT     [ ] Immunosuppression  - Tacrolimus stopped 11/18 in setting of AMS/Seizure, Started Cyclo 11/24  - Cyclo held, MMF held, on stress dose steroids  - PPx: Gancyclovir, bactrim    [] lleus   -@ home on Linzess  - imaging with distended colon (likely opioid induced)  - s/p Neostigmine x 2  - s/p Relistor, last dose 12/1  - s/p colectomy  (see above)  - Daily AXR     [] CMV viremia  - f/u repeat CMV PCR  - currently on Gancyclovir     [ ] AMS  - Reintubated 11/20 Aspiration/hypoxia, extubated 11/24->[id number]/24--> extubated 11/26 -> [id number]/7  - EEG 11/30 negative, Neurology following  - BH following   - off tacro  - on keppra    [ ] HTN/ pAFib  - On Amiodarone  - metoprolol held (hypotensive)   - Eliquis 5mg bid - held 12/6.   - Dr. 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     s/p OLT 11/15 with complicated post op course    [] Septic shock/Cardiogenic shock  [] s/p Colectomy 12/7 POD#3  [] RTOR for closure and Ileostomy creation   [] IAPB 12/7- removed 12/10: CTICU on board  - E coli Bacteremia (12/6) - on jeniffer/linezoid/ caspo. -> switched to  cefepime/ flagyl. Received Tobra x 1 12/6    - Neutropenia: received Neupogen 480mcg x2  - MORAIMA: CRRT started 12/7, low u/o  - f/u CMV PCR  - trend lactate    [] s/p OLT POD #25   - trend LFT's  - Diet: NPO, IVF  - Pain management: avoid narcotics  - Bowel regimen  - Strict I&Os, nichols, wound vac  - US: L brachial DVT   - daily PT     [ ] Immunosuppression  - Tacrolimus stopped 11/18 in setting of AMS/Seizure, Started Cyclo 11/24  - Cyclo held, MMF held, on stress dose steroids  - PPx: Gancyclovir, bactrim    [] lleus   -@ home on Linzess  - imaging with distended colon (likely opioid induced)  - s/p Neostigmine x 2  - s/p Relistor, last dose 12/1  - s/p colectomy  (see above)  - Daily AXR     [] CMV viremia  - f/u repeat CMV PCR  - currently on Gancyclovir     [ ] AMS  - Reintubated 11/20 Aspiration/hypoxia, extubated 11/24->[id number]/24--> extubated 11/26 -> [id number]/7  - EEG 11/30 negative, Neurology following  - BH following   - off tacro  - on keppra    [ ] HTN/ pAFib  - On Amiodarone  - metoprolol held (hypotensive)   - Eliquis 5mg bid - held 12/6.   - Dr. Quintanilla following    [ ] DM  - ISS

## 2024-12-10 NOTE — PROGRESS NOTE ADULT - ASSESSMENT
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 Post-op course c/b worsening mental status and acute hypoxic respiratory failure requiring multiple intubations/extubations, currently extubated (as of 11/26/24) and colonic ileus s/p several rounds of Relistor and Neostigmine with NGT and rectal tube currently in place now with septic shock of unclear etiology. Transplant nephrology consulted for metabolic acidosis and MORAIMA.    1. s/p OLT 11/15/24 with oliguric MORAIMA in setting of septic shock.  - Likely hemodynamically mediated in setting of cardiogenic and septic shock on multiple vasopressors and IABP.   - SCr on admission was 0.48 11/15/24, now uptrended to 1.90.  - UOP today is 105 cc, ~10-15 cc/hr,   - Urinalysis trace ketones, protein 30, 17 casts.   - CT AP non-con: Bilateral renal cysts including cysts with peripheral calcification in the left kidney.  - Given worsening acidosis, hypotension on vasopressors, oliguria, and signs of overload on exam, recommend CRRT at this time. Discussed with transplant hepatology team, would like to hold off for now. Please re-call for CRRT if needed.  - Pt. initiated on CRRT 12/7/24-.  - Continue with CRRT at this time, will adjust for hypophosphatemia.  - Transplant ID consulted for sepsis. Pt on linezolid, meropenem caspofungin   - IS per transplant hepatology team   - Monitor labs and BPs. Avoid nephrotoxins including, ACE/ARB, NSAIDs, contrast, etc.     2. Metabolic Acidosis   - AGMA in setting of septic shock and MORAIMA.  - pH 7.30, pCO2 23. SCO2 10. on 12/6/24.   - Lactate elevated at 8.1 today   - Continue with CRRT as above.       If you have any questions, please feel free to contact me:  Magaly Tello MD PGY-4  Nephrology Fellow  Pager [phone] / [employer] Teams (Preferred)  (After 5pm or on weekends please page the on-call fellow)

## 2024-12-10 NOTE — PROGRESS NOTE ADULT - SUBJECTIVE AND OBJECTIVE BOX
Transplant Surgery - Multidisciplinary Progress Note  --------------------------------------------------------------  OLT   11/15/2024        POD#25   Colectomy 2024   POD#3   IABP     Present:   Patient seen and examined with multidisciplinary Transplant team including Surgeon: Dr. Palomino, Dr. Sorto, JAZZ Nguyễn, Hepatologist: Dr. Roe and ICU team in AM rounds.   Disciplines not in attendance will be notified of the plan.     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     s/p OLT 11/15 with post op course c/b:  ·	Hypoxia: Reintubated , extubated ->reintubated , extubated , reintubated   ·	Ileus   ·	Fever  ·	A fib  ·	AMS/Seizure   ·	L brachial DVT  ·	Neutropenia  ·	E coli Bacteremia ()  ·	s/p Coloctomy .   ·	IABP     Interval/Overnight Events:   - intubated, remains on CRRT  - s/p RTOR for ileostomy and abd closure   - off levo, on vaso/  - urology following for scrotal edema       Immunosuppression:  -Cyclo (held), MMF held this am, on stress dose steroids  -ongoing monitoring for signs of rejection    Potential Discharge date: Pending clinical course  Education: Medications  Plan of care: See Below    TX DATA HERE    ROS: Unable to assess patient is intubated, sedated    PHYSICAL EXAM:   Constitutional: intubated, sedated   Eyes:  PERRLA  ENMT: nc/at, no thrush   Neck: supple   Respiratory: CTA B/L  Cardiovascular: RRR  Gastrointestinal: incision clean/dry/intact + Ostomy pink   Genitourinary: Castro in place   Extremities: SCD's in place and working bilaterally, + LE edema  Neurological: intubated, sedated  Skin: no rashes, ulcerations, lesions Transplant Surgery - Multidisciplinary Progress Note  --------------------------------------------------------------  OLT   11/15/2024        POD#25   Colectomy 2024   POD#3   IABP     Present:   Patient seen and examined with multidisciplinary Transplant team including Surgeon: Dr. Palomino, Dr. Sorto, JAZZ Nguyễn, Hepatologist: Dr. Roe and ICU team in AM rounds.   Disciplines not in attendance will be notified of the plan.     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     s/p OLT 11/15 with post op course c/b:  ·	Hypoxia: Reintubated , extubated ->reintubated , extubated , reintubated   ·	Ileus   ·	Fever  ·	A fib  ·	AMS/Seizure   ·	L brachial DVT  ·	Neutropenia  ·	E coli Bacteremia ()  ·	s/p Coloctomy .   ·	IABP     Interval/Overnight Events:   - intubated, remains on CRRT  - s/p RTOR for ileostomy and abd closure   - off levo, on vaso/  - urology following for scrotal edema       Immunosuppression:  -Cyclo (held), MMF held this am, on stress dose steroids  -ongoing monitoring for signs of rejection    Potential Discharge date: Pending clinical course  Education: Medications  Plan of care: See Below      MEDICATIONS  (STANDING):  albumin human 25% IVPB 100 milliLiter(s) IV Intermittent every 8 hours  aMIOdarone Infusion 0.501 mG/Min (16.7 mL/Hr) IV Continuous <Continuous>  Calcium Chloride 5 Gram(s),Sodium Chloride 0.9% 400 milliLiter(s) 5 Gram(s) (16.67 mL/Hr) IV Continuous <Continuous>  caspofungin IVPB 50 milliGRAM(s) IV Intermittent every 24 hours  cefepime   IVPB      cefepime   IVPB 1000 milliGRAM(s) IV Intermittent every 8 hours  chlorhexidine 0.12% Liquid 15 milliLiter(s) Oral Mucosa every 12 hours  chlorhexidine 2% Cloths 1 Application(s) Topical <User Schedule>  CRRT Treatment    <Continuous>  dexMEDEtomidine Infusion 1 MICROgram(s)/kG/Hr (23.4 mL/Hr) IV Continuous <Continuous>  dextrose 50% Injectable 50 milliLiter(s) IV Push every 15 minutes  DOBUTamine Infusion 5 MICROgram(s)/kG/Min (14 mL/Hr) IV Continuous <Continuous>  ganciclovir IVPB 120 milliGRAM(s) IV Intermittent every 24 hours  insulin regular Infusion 1 Unit(s)/Hr (1 mL/Hr) IV Continuous <Continuous>  linezolid  IVPB 600 milliGRAM(s) IV Intermittent every 12 hours  metroNIDAZOLE  IVPB 500 milliGRAM(s) IV Intermittent every 12 hours  norepinephrine Infusion 0.01 MICROgram(s)/kG/Min (1.76 mL/Hr) IV Continuous <Continuous>  pantoprazole  Injectable 40 milliGRAM(s) IV Push every 12 hours  Phoxillum Filtration BK 4 / 2.5 5000 milliLiter(s) (1200 mL/Hr) CRRT <Continuous>  Phoxillum Filtration BK 4 / 2.5 5000 milliLiter(s) (250 mL/Hr) CRRT <Continuous>  PrismaSATE Dialysate BGK 4 / 2.5 5000 milliLiter(s) (1300 mL/Hr) CRRT <Continuous>  trimethoprim / sulfamethoxazole IVPB 160 milliGRAM(s) IV Intermittent daily  vasopressin Infusion 0.01 Unit(s)/Min (1.5 mL/Hr) IV Continuous <Continuous>    MEDICATIONS  (PRN):      PAST MEDICAL & SURGICAL HISTORY:  Diabetes      Transaminitis      Paroxysmal atrial fibrillation      Depression      BPH (benign prostatic hyperplasia)      Hypertension      Chronic atrial fibrillation      Coronary artery disease      Hepatocellular carcinoma      DM (diabetes mellitus)      HTN (hypertension)      Paroxysmal atrial fibrillation      Cirrhosis      HCC (hepatocellular carcinoma)      History of BPH      History of laparoscopic cholecystectomy      History of lumbar laminectomy      H/O prior ablation treatment      H/O percutaneous left heart catheterization          Vital Signs Last 24 Hrs  T(C): 36.5 (10 Dec 2024 18:00), Max: 37.5 (10 Dec 2024 16:00)  T(F): 97.7 (10 Dec 2024 18:00), Max: 99.5 (10 Dec 2024 16:00)  HR: 87 (10 Dec 2024 19:00) (67 - 88)  BP: --  BP(mean): --  RR: 22 (10 Dec 2024 19:00) (18 - 29)  SpO2: 97% (10 Dec 2024 19:00) (93% - 100%)    Parameters below as of 10 Dec 2024 08:00  Patient On (Oxygen Delivery Method): ventilator    O2 Concentration (%): 40    I&O's Summary    09 Dec 2024 07:01  -  10 Dec 2024 07:00  --------------------------------------------------------  IN: 2759.5 mL / OUT: 3793 mL / NET: -1033.5 mL    10 Dec 2024 07:01  -  10 Dec 2024 20:04  --------------------------------------------------------  IN: 2549.2 mL / OUT: 1991 mL / NET: 558.2 mL                              10.6   7.71  )-----------( 38       ( 10 Dec 2024 15:43 )             32.0     12-10    138  |  103  |  32[H]  ----------------------------<  149[H]  4.5   |  16[L]  |  1.07    Ca    7.9[L]      10 Dec 2024 15:43  Phos  2.8     12-10  Mg     2.4     12-10    TPro  3.8[L]  /  Alb  2.8[L]  /  TBili  8.1[H]  /  DBili  x   /  AST  55[H]  /  ALT  183[H]  /  AlkPhos  67  12-10          Culture - Blood (collected 24 @ 15:30)  Source: .Blood BLOOD  Preliminary Report (12-10-24 @ 19:01):    No growth at 72 Hours    Culture - Body Fluid with Gram Stain (collected 24 @ 11:18)  Source: Body Fluid  Gram Stain (24 @ 18:22):    polymorphonuclear leukocytes seen    No organisms seen    by cytocentrifuge  Preliminary Report (24 @ 10:36):    No growth to date.    Culture - Blood (collected 24 @ 13:30)  Source: .Blood BLOOD  Gram Stain (24 @ 03:44):    Growth in aerobic bottle: Gram Negative Rods    Growth in anaerobic bottle: Gram Negative Rods  Final Report (24 @ 13:18):    Growth in aerobic and anaerobic bottles: Escherichia coli    See previous culture [id number]    Culture - Blood (collected 24 @ 13:25)  Source: .Blood BLOOD  Gram Stain (24 @ 03:07):    Growth in aerobic and anaerobic bottles: Gram Negative Rods  Final Report (24 @ 13:17):    Growth in aerobic and anaerobic bottles: Escherichia coli    Direct identification is available within approximately 3-5    hours either by Blood Panel Multiplexed PCR or Direct    MALDI-TOF. Details: [email or web address]  Organism: Blood Culture PCR  Escherichia coli (24 @ 13:17)  Organism: Escherichia coli (24 @ 13:17)  Organism: Blood Culture PCR (24 @ 13:17)    Culture - Blood (collected 24 @ 14:15)  Source: .Blood BLOOD  Final Report (12-10-24 @ 18:00):    No growth at 5 days    Culture - Blood (collected 24 @ 14:00)  Source: .Blood BLOOD  Final Report (12-10-24 @ 18:00):    No growth at 5 days        ROS: Unable to assess patient is intubated, sedated    PHYSICAL EXAM:   Constitutional: intubated, sedated   Eyes:  PERRLA  ENMT: nc/at, no thrush   Neck: supple   Respiratory: CTA B/L  Cardiovascular: RRR  Gastrointestinal: incision clean/dry/intact + Ostomy pink   Genitourinary: Castro in place   Extremities: SCD's in place and working bilaterally, + LE edema  Neurological: intubated, sedated  Skin: no rashes, ulcerations, lesions

## 2024-12-10 NOTE — PROGRESS NOTE ADULT - ASSESSMENT
75 yo M with cardiogenic shock s/p IABP insertion, total abdominal colectomy with ileostomy on 12/7/2024.     Cardiogenic shock  Hypovolemia  Lactic acidosis  Acute respiratory failure  Acute liver injury, transaminitis, hyperbilirubinemia  Acute kidney injury  Hypokalemia  Hypocalcemia  Acute blood loss anemia  Thrombocytopenia  E coli bacteremia      Wound Consult requested to assist w/ management of  Sacral/ buttocks deep tissue pressure injury   Moisture Associated Dermatitis scrotum/ Buttocks/ Thighs  Scrotal Edeam  Ascites       Buttocks/ Sacrum/ Scrotum / TRIAD BID and prn soiling    Scrotal elevation w/ folded attends pad    Continue w/ attends under pads and Pericare w/ Castro & Ostomy care as per protocol  Continue turning and positioning w/ offloading assistive devices as per protocol  Waffle Cushion to chair when oob to chair  Continue w/ alternating air with low air loss surface pressure redistribution bed surface   Moisturize intact skin w/ SWEEN cream BID  Appreciate nutrition consult - pt w/ Increased protein-energy needs and Acute Severe protein calorie malnutrition       multivitamin, vitamin C to assist w/ wound healing       diet pending as pt post op- Consider CAMI to promote wound healing when post op care allows  Pt will need Group 2 mattress on hospital bed and ROHO cushion for wheel chair upon discharge home  Care as per CTS, will follow w/ you  Upon discharge f/u as outpatient at [facility] Center [address] [place] [address] [phone]  Seen &  D/w alysa, team & RN  Thank you for this consult  Nights/ Weekends/ Holidays please call:  General Surgery Consult pager ([phone]) for emergencies  Wound PT for multilayer leg wrapping or VAC issues ([phone])  I spent 50minutes face to face w/ this pt of which more than 50% of the time was spent counseling & coordinating care of this pt.  73 yo M with cardiogenic shock s/p IABP insertion, total abdominal colectomy with ileostomy on 12/7/2024.     Cardiogenic shock  Hypovolemia  Lactic acidosis  Acute respiratory failure  Acute liver injury, transaminitis, hyperbilirubinemia  Acute kidney injury  Hypokalemia  Hypocalcemia  Acute blood loss anemia  Thrombocytopenia  E coli bacteremia      Wound Consult requested to assist w/ management of  Sacral/ buttocks deep tissue pressure injury   Moisture Associated Dermatitis scrotum/ Buttocks/ Thighs  Scrotal Edema  Ascites       Buttocks/ Sacrum/ Scrotum / thighs  TRIAD BID and prn soiling    Scrotal elevation w/ folded attends pad  surgical wound management as per surgical team    Continue w/ attends under pads and Pericare w/ Castro & Ostomy care as per protocol  Continue turning and positioning w/ offloading assistive devices as per protocol  Waffle Cushion to chair when oob to chair  Continue w/ alternating air with low air loss surface pressure redistribution bed surface   Moisturize intact skin w/ SWEEN cream BID  Appreciate nutrition consult - pt w/ Increased protein-energy needs and Acute Severe protein calorie malnutrition       multivitamin, vitamin C to assist w/ wound healing       diet pending as pt post op- Consider CAMI to promote wound healing when post op care allows  Care as per CTS, will follow w/ you  Upon discharge f/u as outpatient at [facility] [address] [facility] [phone]  Seen &  D/w alysa, team & RN  Thank you for this consult  Nights/ Weekends/ Holidays please call:  General Surgery Consult pager ([phone]) for emergencies  Wound PT for multilayer leg wrapping or VAC issues (x [phone])   73 yo M with cardiogenic shock s/p IABP insertion, total abdominal colectomy with ileostomy on 12/7/2024.     Cardiogenic shock  Hypovolemia  Lactic acidosis  Acute respiratory failure  Acute liver injury, transaminitis, hyperbilirubinemia  Acute kidney injury  Hypokalemia  Hypocalcemia  Acute blood loss anemia  Thrombocytopenia  E coli bacteremia      Wound Consult requested to assist w/ management of  Sacral/ buttocks deep tissue pressure injury   Moisture Associated Dermatitis scrotum/ Buttocks/ Thighs  Scrotal Edema  Ascites       Buttocks/ Sacrum/ Scrotum / thighs  TRIAD BID and prn soiling    Scrotal elevation w/ folded attends pad  surgical wound management as per surgical team    Continue w/ attends under pads and Pericare w/ Castro & Ostomy care as per protocol  Continue turning and positioning w/ offloading assistive devices as per protocol  cosnider NOEMI/PVR's if in line with GOC   Waffle Cushion to chair when oob to chair  Continue w/ alternating air with low air loss surface pressure redistribution bed surface   Moisturize intact skin w/ SWEEN cream BID  Appreciate nutrition consult - pt w/ Increased protein-energy needs and Acute Severe protein calorie malnutrition       multivitamin, vitamin C to assist w/ wound healing       diet pending as pt post op- Consider CAMI to promote wound healing when post op care allows  Care as per CTS, will follow w/ you  Upon discharge f/u as outpatient at Wound Center [address] [place] [address] [phone]  Seen &  D/w alysa, team & RN  Thank you for this consult  Nights/ Weekends/ Holidays please call:  General Surgery Consult pager ([phone]) for emergencies  Wound PT for multilayer leg wrapping or VAC issues (x [phone])

## 2024-12-10 NOTE — PROGRESS NOTE ADULT - SUBJECTIVE AND OBJECTIVE BOX
Follow Up:      Interval History:    REVIEW OF SYSTEMS  [  ] ROS unobtainable because:    [  ] All other systems negative except as noted below    Constitutional:  [ ] fever [ ] chills  [ ] weight loss  [ ] weakness  Skin:  [ ] rash [ ] phlebitis	  Eyes: [ ] icterus [ ] pain  [ ] discharge	  ENMT: [ ] sore throat  [ ] thrush [ ] ulcers [ ] exudates  Respiratory: [ ] dyspnea [ ] hemoptysis [ ] cough [ ] sputum	  Cardiovascular:  [ ] chest pain [ ] palpitations [ ] edema	  Gastrointestinal:  [ ] nausea [ ] vomiting [ ] diarrhea [ ] constipation [ ] pain	  Genitourinary:  [ ] dysuria [ ] frequency [ ] hematuria [ ] discharge [ ] flank pain  [ ] incontinence  Musculoskeletal:  [ ] myalgias [ ] arthralgias [ ] arthritis  [ ] back pain  Neurological:  [ ] headache [ ] seizures  [ ] confusion/altered mental status    Allergies  No Known Allergies        ANTIMICROBIALS:  caspofungin IVPB 50 every 24 hours  cefepime   IVPB    cefepime   IVPB 1000 every 8 hours  ganciclovir IVPB 120 every 24 hours  linezolid  IVPB 600 every 12 hours  metroNIDAZOLE  IVPB 500 every 12 hours  trimethoprim / sulfamethoxazole IVPB 160 daily      OTHER MEDS:  MEDICATIONS  (STANDING):  aMIOdarone Infusion 0.501 <Continuous>  dexMEDEtomidine Infusion 1 <Continuous>  dextrose 50% Injectable 50 every 15 minutes  DOBUTamine Infusion 5 <Continuous>  hydrocortisone sodium succinate Injectable 50 every 8 hours  insulin regular Infusion 1 <Continuous>  norepinephrine Infusion 0.01 <Continuous>  pantoprazole  Injectable 40 every 12 hours  vasopressin Infusion 0.01 <Continuous>      Vital Signs Last 24 Hrs  T(C): 37.1 (10 Dec 2024 12:00), Max: 37.1 (10 Dec 2024 12:00)  T(F): 98.8 (10 Dec 2024 12:00), Max: 98.8 (10 Dec 2024 12:00)  HR: 73 (10 Dec 2024 15:29) (67 - 88)  BP: --  BP(mean): --  RR: 29 (10 Dec 2024 14:00) (18 - 29)  SpO2: 98% (10 Dec 2024 15:29) (93% - 100%)    Parameters below as of 10 Dec 2024 08:00  Patient On (Oxygen Delivery Method): ventilator    O2 Concentration (%): 40    PHYSICAL EXAMINATION:  General: Alert and Awake, NAD  HEENT: PERRL, EOMI  Neck: Supple  Cardiac: RRR, No M/R/G  Resp: CTAB, No Wh/Rh/Ra  Abdomen: NBS, NT/ND, No HSM, No rigidity or guarding  MSK: No LE edema. No Calf tenderness  : No nichols  Skin: No rashes or lesions. Skin is warm and dry to the touch.   Neuro: Alert and Awake. CN 2-12 Grossly intact. Moves all four extremities spontaneously.  Psych: Calm, Pleasant, Cooperative                          10.4   6.43  )-----------( 22       ( 10 Dec 2024 08:42 )             31.1       12-10    140  |  103  |  32[H]  ----------------------------<  131[H]  4.3   |  17[L]  |  1.10    Ca    7.7[L]      10 Dec 2024 08:46  Phos  2.1     12-10  Mg     2.3     12-10    TPro  3.7[L]  /  Alb  3.0[L]  /  TBili  7.8[H]  /  DBili  x   /  AST  65[H]  /  ALT  231[H]  /  AlkPhos  64  12-10      Urinalysis Basic - ( 10 Dec 2024 08:46 )    Color: x / Appearance: x / SG: x / pH: x  Gluc: 131 mg/dL / Ketone: x  / Bili: x / Urobili: x   Blood: x / Protein: x / Nitrite: x   Leuk Esterase: x / RBC: x / WBC x   Sq Epi: x / Non Sq Epi: x / Bacteria: x        MICROBIOLOGY:  v  .Blood BLOOD  12-07-24   No growth at 48 Hours  --  --      Body Fluid  12-07-24   No growth to date.  --    polymorphonuclear leukocytes seen  No organisms seen  by cytocentrifuge      .Blood BLOOD  12-06-24   Growth in aerobic and anaerobic bottles: Escherichia coli  See previous culture [id number]  --    Growth in aerobic bottle: Gram Negative Rods  Growth in anaerobic bottle: Gram Negative Rods      .Blood BLOOD  12-06-24   Growth in aerobic and anaerobic bottles: Escherichia coli  Direct identification is available within approximately 3-5  hours either by Blood Panel Multiplexed PCR or Direct  MALDI-TOF. Details: [email or web address]  --  Blood Culture PCR  Escherichia coli      .Blood BLOOD  12-05-24   No growth at 4 days  --  --      .Blood BLOOD  12-05-24   No growth at 4 days  --  --      .Stool  12-01-24   No enteric pathogens isolated.  (Stool culture examined for Salmonella,  Shigella, Campylobacter, Aeromonas, Plesiomonas,  Vibrio, E.coli O157 and Yersinia)  --  --      .Blood BLOOD  11-26-24   No growth at 5 days  --  --      .Blood BLOOD  11-26-24   No growth at 5 days  --  --      Bronchial  11-24-24   Few Candida glabrata  --  Candida (Torulopsis) glabrata      .Blood BLOOD  11-24-24   No growth at 5 days  --  --      .Blood BLOOD  11-24-24   No growth at 5 days  --  --      Combi-Cath  11-23-24   Commensal charity consistent with body site  --    Moderate polymorphonuclear leukocytes per low power field  No squamous epithelial cells per low power field  No organisms seen      .Blood BLOOD  11-22-24   No growth at 5 days  --  --      .Blood BLOOD  11-20-24   No growth at 5 days  --  --      .Blood BLOOD  11-20-24   No growth at 5 days  --  --      Body Fluid  11-15-24   No growth at 5 days  --    No polymorphonuclear leukocytes seen  No organisms seen  by cytocentrifuge        Toxoplasma IgG Screen: 78.00 IU/mL (11-15-24 @ 00:41)  CMV IgG Antibody: 1.50 U/mL (11-15-24 @ 00:41)    CMVPCR Log: Det <1.54 Assay Dynamic Range: 34.5 to 1.0E+07 IU/mL (1.54 to 7.00 Log10 IU/mL)  Assay lower limit of quantification (LLOQ) is 34.5 IU/mL (1.54 Log10  IU/mL)  CMV DNA detected below the LLOQ will be reported as Detected < 34.5 IU/mL  (<1.54 Log10 IU/mL)  Nydia Cytomegalovirus (CMV) is an FDA-cleared quantitative test that  enables the detection and quantitation of CMV DNA in EDTA plasma of  infected transplant patients on the nydia 8800 system. This test was  verified by [employer]. Results should be interpreted  with consideration of all clinical findings and laboratory findings and  should not form the sole basis for a diagnosis or treatment decision. Nbf39FS/mL (12-06 @ 15:31)        RADIOLOGY:    <The imaging below has been reviewed and visualized by me independently. Findings as detailed in report below> Follow Up:  bacteremia.     Interval History: afebrile. remains intubated.     REVIEW OF SYSTEMS  [ x ] ROS unobtainable because:  intubated  [  ] All other systems negative except as noted below    Constitutional:  [ ] fever [ ] chills  [ ] weight loss  [ ] weakness  Skin:  [ ] rash [ ] phlebitis	  Eyes: [ ] icterus [ ] pain  [ ] discharge	  ENMT: [ ] sore throat  [ ] thrush [ ] ulcers [ ] exudates  Respiratory: [ ] dyspnea [ ] hemoptysis [ ] cough [ ] sputum	  Cardiovascular:  [ ] chest pain [ ] palpitations [ ] edema	  Gastrointestinal:  [ ] nausea [ ] vomiting [ ] diarrhea [ ] constipation [ ] pain	  Genitourinary:  [ ] dysuria [ ] frequency [ ] hematuria [ ] discharge [ ] flank pain  [ ] incontinence  Musculoskeletal:  [ ] myalgias [ ] arthralgias [ ] arthritis  [ ] back pain  Neurological:  [ ] headache [ ] seizures  [ ] confusion/altered mental status    Allergies  No Known Allergies        ANTIMICROBIALS:  caspofungin IVPB 50 every 24 hours  cefepime   IVPB    cefepime   IVPB 1000 every 8 hours  ganciclovir IVPB 120 every 24 hours  linezolid  IVPB 600 every 12 hours  metroNIDAZOLE  IVPB 500 every 12 hours  trimethoprim / sulfamethoxazole IVPB 160 daily      OTHER MEDS:  MEDICATIONS  (STANDING):  aMIOdarone Infusion 0.501 <Continuous>  dexMEDEtomidine Infusion 1 <Continuous>  dextrose 50% Injectable 50 every 15 minutes  DOBUTamine Infusion 5 <Continuous>  hydrocortisone sodium succinate Injectable 50 every 8 hours  insulin regular Infusion 1 <Continuous>  norepinephrine Infusion 0.01 <Continuous>  pantoprazole  Injectable 40 every 12 hours  vasopressin Infusion 0.01 <Continuous>      Vital Signs Last 24 Hrs  T(C): 37.1 (10 Dec 2024 12:00), Max: 37.1 (10 Dec 2024 12:00)  T(F): 98.8 (10 Dec 2024 12:00), Max: 98.8 (10 Dec 2024 12:00)  HR: 73 (10 Dec 2024 15:29) (67 - 88)  BP: --  BP(mean): --  RR: 29 (10 Dec 2024 14:00) (18 - 29)  SpO2: 98% (10 Dec 2024 15:29) (93% - 100%)    Parameters below as of 10 Dec 2024 08:00  Patient On (Oxygen Delivery Method): ventilator    O2 Concentration (%): 40    PHYSICAL EXAMINATION:  General: Intubated and Sedated  HEENT: +ETT  Neck: Supple  Cardiac: RRR, No M/R/G  Resp: CTAB, No Wh/Rh/Ra  Abdomen: +ileostomy, dressing over surgical site, NBS, NT/ND, No HSM, No rigidity or guarding  MSK: No LE edema. No Calf tenderness  Skin: No rashes or lesions. Skin is warm and dry to the touch.   Neuro: Intubated and Sedated  Psych: Unable to assess - intubated and sedated                          10.4   6.43  )-----------( 22       ( 10 Dec 2024 08:42 )             31.1       12-10    140  |  103  |  32[H]  ----------------------------<  131[H]  4.3   |  17[L]  |  1.10    Ca    7.7[L]      10 Dec 2024 08:46  Phos  2.1     12-10  Mg     2.3     12-10    TPro  3.7[L]  /  Alb  3.0[L]  /  TBili  7.8[H]  /  DBili  x   /  AST  65[H]  /  ALT  231[H]  /  AlkPhos  64  12-10      Urinalysis Basic - ( 10 Dec 2024 08:46 )    Color: x / Appearance: x / SG: x / pH: x  Gluc: 131 mg/dL / Ketone: x  / Bili: x / Urobili: x   Blood: x / Protein: x / Nitrite: x   Leuk Esterase: x / RBC: x / WBC x   Sq Epi: x / Non Sq Epi: x / Bacteria: x        MICROBIOLOGY:  v  .Blood BLOOD  12-07-24   No growth at 48 Hours  --  --      Body Fluid  12-07-24   No growth to date.  --    polymorphonuclear leukocytes seen  No organisms seen  by cytocentrifuge      .Blood BLOOD  12-06-24   Growth in aerobic and anaerobic bottles: Escherichia coli  See previous culture [id number]  --    Growth in aerobic bottle: Gram Negative Rods  Growth in anaerobic bottle: Gram Negative Rods      .Blood BLOOD  12-06-24   Growth in aerobic and anaerobic bottles: Escherichia coli  Direct identification is available within approximately 3-5  hours either by Blood Panel Multiplexed PCR or Direct  MALDI-TOF. Details: [email or web address]  --  Blood Culture PCR  Escherichia coli      .Blood BLOOD  12-05-24   No growth at 4 days  --  --      .Blood BLOOD  12-05-24   No growth at 4 days  --  --      .Stool  12-01-24   No enteric pathogens isolated.  (Stool culture examined for Salmonella,  Shigella, Campylobacter, Aeromonas, Plesiomonas,  Vibrio, E.coli O157 and Yersinia)  --  --      .Blood BLOOD  11-26-24   No growth at 5 days  --  --      .Blood BLOOD  11-26-24   No growth at 5 days  --  --      Bronchial  11-24-24   Few Candida glabrata  --  Candida (Torulopsis) glabrata      .Blood BLOOD  11-24-24   No growth at 5 days  --  --      .Blood BLOOD  11-24-24   No growth at 5 days  --  --      Combi-Cath  11-23-24   Commensal charity consistent with body site  --    Moderate polymorphonuclear leukocytes per low power field  No squamous epithelial cells per low power field  No organisms seen      .Blood BLOOD  11-22-24   No growth at 5 days  --  --      .Blood BLOOD  11-20-24   No growth at 5 days  --  --      .Blood BLOOD  11-20-24   No growth at 5 days  --  --      Body Fluid  11-15-24   No growth at 5 days  --    No polymorphonuclear leukocytes seen  No organisms seen  by cytocentrifuge        Toxoplasma IgG Screen: 78.00 IU/mL (11-15-24 @ 00:41)  CMV IgG Antibody: 1.50 U/mL (11-15-24 @ 00:41)    CMVPCR Log: Det <1.54 Assay Dynamic Range: 34.5 to 1.0E+07 IU/mL (1.54 to 7.00 Log10 IU/mL)  Assay lower limit of quantification (LLOQ) is 34.5 IU/mL (1.54 Log10  IU/mL)  CMV DNA detected below the LLOQ will be reported as Detected < 34.5 IU/mL  (<1.54 Log10 IU/mL)  Nydia Cytomegalovirus (CMV) is an FDA-cleared quantitative test that  enables the detection and quantitation of CMV DNA in EDTA plasma of  infected transplant patients on the nydia 8800 system. This test was  verified by [employer]. Results should be interpreted  with consideration of all clinical findings and laboratory findings and  should not form the sole basis for a diagnosis or treatment decision. Zmk37WK/mL (12-06 @ 15:31)        RADIOLOGY:    <The imaging below has been reviewed and visualized by me independently. Findings as detailed in report below>    < from: US Testicles (12.10.24 @ 14:06) >  IMPRESSION:  There is marked scrotal wall thickening and edema. No discrete fluid   collection or abscess is identified. These findings are concerning for   cellulitis. Clinical correlation is recommended.    Both testicles appear mildly heterogeneous in echotexture, which may   reflect edema. There is limited Doppler assessment on this examination.   Spectral Doppler venous waveforms are demonstrated. Arterial waveforms   were not demonstrated. The significance of these findings is uncertain in   the setting of large volume edema, cardiogenic shock and IABP. Vascular   compromise and testicular ischemia is not excluded.    Close, short-term follow-up scrotal ultrasound to reevaluate these   findings is advised.    < end of copied text >

## 2024-12-10 NOTE — PROGRESS NOTE ADULT - ASSESSMENT
74 year-old with known coronary artery disease as above presents for liver transplant.  Seen to have chest pain post transplant.  It was atypical of angina and has resolved.  Troponin remained negative.    Now with atrial fibrillation with RVR (11/19) - rate control with beta-blocker as needed, now status post Digoxin load  Converted to sinus rhythm on 12/2.    Rate control with betablocker and Digoxin - uptitrate beta-blocker as tolerated    Patient was septic on Friday, 12/6, and at that time, he was seen to be hyperdynamic with TODD and severe LVOT gradient.  Post ex-lap, patient seen to have newly reduced LVEF.  IABP placed. Inotrope and pressor support.  Recommend weaning both inotrope and pressors. Plan to remove IABP.  Keep MAP >65 mmHg. Monitor output via [place].

## 2024-12-11 ENCOUNTER — RESULT REVIEW (OUTPATIENT)
Age: 74
End: 2024-12-11

## 2024-12-11 NOTE — PROGRESS NOTE ADULT - PROBLEM SELECTOR PLAN 4
- postop noted to have rise in renal function requiring CVVHD  - at this time remains on CVVHD  - nephrology following

## 2024-12-11 NOTE — PROGRESS NOTE ADULT - PROBLEM SELECTOR PLAN 1
- postop complicated by E.coli bacteremia and CMV viremia  - Transplant ID following  - Remains on IV abx   - most recent cultures have cleared  - managed by CTU team

## 2024-12-11 NOTE — PHYSICAL THERAPY INITIAL EVALUATION ADULT - BED MOBILITY TRAINING, PT EVAL
GOAL: Pt will perform all aspects of bed mobility with modA to help prevent pressure ulcers, by 4 weeks
GOAL: Pt will perform all bed mobility activities independently in 2 weeks.

## 2024-12-11 NOTE — PROGRESS NOTE ADULT - SUBJECTIVE AND OBJECTIVE BOX
ADVANCED HEART FAILURE & TRANSPLANT- PROGRESS NOTE  *To reach the NS1 Team from 8am to 5pm, please call [phone].  ___________________________________________________________________________    Subjective:  - intubated    Medications:  albumin human 25% IVPB 100 milliLiter(s) IV Intermittent every 6 hours  aMIOdarone Infusion 1 mG/Min IV Continuous <Continuous>  Calcium Chloride 7 Gram(s),Sodium Chloride 0.9% 600 milliLiter(s) 7 Gram(s) IV Continuous <Continuous>  caspofungin IVPB 50 milliGRAM(s) IV Intermittent every 24 hours  cefepime   IVPB      cefepime   IVPB 1000 milliGRAM(s) IV Intermittent every 8 hours  chlorhexidine 0.12% Liquid 15 milliLiter(s) Oral Mucosa every 12 hours  chlorhexidine 2% Cloths 1 Application(s) Topical <User Schedule>  CRRT Treatment    <Continuous>  dexMEDEtomidine Infusion 1 MICROgram(s)/kG/Hr IV Continuous <Continuous>  dextrose 50% Injectable 50 milliLiter(s) IV Push every 15 minutes  DOBUTamine Infusion 4 MICROgram(s)/kG/Min IV Continuous <Continuous>  ganciclovir IVPB 120 milliGRAM(s) IV Intermittent every 24 hours  hydrocortisone sodium succinate Injectable 50 milliGRAM(s) IV Push every 12 hours  insulin regular Infusion 1 Unit(s)/Hr IV Continuous <Continuous>  levalbuterol Inhalation 0.63 milliGRAM(s) Inhalation every 8 hours PRN  metroNIDAZOLE  IVPB 500 milliGRAM(s) IV Intermittent every 12 hours  norepinephrine Infusion 0.01 MICROgram(s)/kG/Min IV Continuous <Continuous>  pantoprazole  Injectable 40 milliGRAM(s) IV Push every 12 hours  Phoxillum Filtration BK 4 / 2.5 5000 milliLiter(s) CRRT <Continuous>  PrismaSOL Filtration BGK 4 / 2.5 5000 milliLiter(s) CRRT <Continuous>  PrismaSOL Filtration BGK 4 / 2.5 5000 milliLiter(s) CRRT <Continuous>  sodium bicarbonate  Injectable 50 milliEquivalent(s) IV Push <User Schedule>  thiamine Injectable 100 milliGRAM(s) IV Push daily  trimethoprim / sulfamethoxazole IVPB 160 milliGRAM(s) IV Intermittent daily  vasopressin Infusion 0.01 Unit(s)/Min IV Continuous <Continuous>      ICU Vital Signs Last 24 Hrs  T(C): 37 (11 Dec 2024 12:00), Max: 37.5 (10 Dec 2024 16:00)  T(F): 98.6 (11 Dec 2024 12:00), Max: 99.5 (10 Dec 2024 16:00)  HR: 92 (11 Dec 2024 12:15) (71 - 129)  BP: --  BP(mean): --  ABP: 113/43 (11 Dec 2024 12:15) (105/37 - 175/57)  ABP(mean): 65 (11 Dec 2024 12:15) (59 - 97)  RR: 25 (11 Dec 2024 12:15) (21 - 32)  SpO2: 95% (11 Dec 2024 12:15) (86% - 100%)    O2 Parameters below as of 11 Dec 2024 12:00  Patient On (Oxygen Delivery Method): ventilator, CPAP 10/7        Mode: CPAP with PS  FiO2: 40  PEEP: 7  PS: 10  MAP: 11  PIP: 17        Weight in k.6 (12-11 @ 00:00)    I&O's Summary    10 Dec 2024 07:01  -  11 Dec 2024 07:00  --------------------------------------------------------  IN: 3973.8 mL / OUT: 4666 mL / NET: -692.2 mL    11 Dec 2024 07:01  -  11 Dec 2024 12:37  --------------------------------------------------------  IN: 1034.7 mL / OUT: 1614 mL / NET: -579.3 mL        Physical Exam  General: intubated and sedated  Chest: Clear to auscultation anterior laterally  CV: Normal S1 and S2.  Abdomen: Soft, non-distended, non-tender  Extremities: warm and perfused, + 1 edema b/l   Neurology: sedated    Labs:                        9.8    8.12  )-----------( 13       ( 11 Dec 2024 12:16 )             29.7         137  |  101  |  34[H]  ----------------------------<  149[H]  4.6   |  14[L]  |  1.05    Ca    8.4      11 Dec 2024 00:34  Phos  3.1       Mg     2.4         TPro  4.0[L]  /  Alb  3.0[L]  /  TBili  9.3[H]  /  DBili  x   /  AST  43[H]  /  ALT  157[H]  /  AlkPhos  74      PT/INR - ( 11 Dec 2024 00:33 )   PT: 21.3 sec;   INR: 1.88 ratio         PTT - ( 11 Dec 2024 00:33 )  PTT:52.1 sec        Oxygen Saturation, Mixed: 84.9 ( @ 12:05)  Oxygen Saturation, Mixed: 81.6 ( @ 08:10)  Oxygen Saturation, Mixed: 74.9 ( @ 04:37)  Oxygen Saturation, Mixed: 75.0 ( @ 00:00)  Oxygen Saturation, Mixed: 71.4 (12-10 @ 22:34)  Oxygen Saturation, Mixed: 71.3 (12-10 @ 15:10)  Oxygen Saturation, Mixed: 75.3 (12-10 @ 08:27)  Oxygen Saturation, Mixed: 82.1 (12-10 @ 03:59)  Oxygen Saturation, Mixed: 85.6 ( @ 23:56)  Oxygen Saturation, Mixed: 85.4 ( @ 20:00)  Oxygen Saturation, Mixed: 91.1 ( @ 17:30)  Oxygen Saturation, Mixed: 83.6 ( @ 14:45)  Oxygen Saturation, Mixed: 78.8 ( @ 10:00)  Oxygen Saturation, Mixed: 76.2 ( @ 08:25)  Oxygen Saturation, Mixed: 75.3 ( @ 06:11)  Oxygen Saturation, Mixed: 75.6 ( @ 03:59)  Oxygen Saturation, Mixed: 76.1 ( @ 02:00)  Oxygen Saturation, Mixed: 79.0 ( @ 00:06)  Oxygen Saturation, Mixed: 73.6 ( @ 22:00)  Oxygen Saturation, Mixed: 66.6 ( @ 20:05)  Oxygen Saturation, Mixed: 71.5 ( @ 17:58)  Oxygen Saturation, Mixed: 71.6 ( @ 14:30)  Oxygen Saturation, Mixed: 76.4 ( @ 13:18)    Lactate Dehydrogenase, Serum: 231 U/L (12-10 @ 00:29)  Lactate Dehydrogenase, Serum: 241 U/L ( @ 00:32)  Lactate Dehydrogenase, Serum: 288 U/L ( @ 18:30)          Imaging Studies

## 2024-12-11 NOTE — PROGRESS NOTE ADULT - SUBJECTIVE AND OBJECTIVE BOX
[facility] DIVISION OF KIDNEY DISEASES AND HYPERTENSION -- FOLLOW UP NOTE  --------------------------------------------------------------------------------  Chief Complaint: s/p OLT 11/15/24 with oliguric MORAIMA in setting of cardiogenic/septic shock.    24 hour events/subjective: Pt. seen and examined at CTU earlier today with son at bedside. Pt. remains of CRRT with worsening acidosis. Pt. remains intubated/sedated. No fever, CP, SOB, LE edema, HA or dizziness during rounds today.         PAST HISTORY  --------------------------------------------------------------------------------  No significant changes to PMH, PSH, FHx, SHx, unless otherwise noted    ALLERGIES & MEDICATIONS  --------------------------------------------------------------------------------  Allergies    No Known Allergies    Intolerances      Standing Inpatient Medications  albumin human 25% IVPB 100 milliLiter(s) IV Intermittent every 6 hours  aMIOdarone Infusion 1 mG/Min IV Continuous <Continuous>  Calcium Chloride 7 Gram(s),Sodium Chloride 0.9% 600 milliLiter(s) 7 Gram(s) IV Continuous <Continuous>  caspofungin IVPB 50 milliGRAM(s) IV Intermittent every 24 hours  cefepime   IVPB      cefepime   IVPB 1000 milliGRAM(s) IV Intermittent every 8 hours  chlorhexidine 0.12% Liquid 15 milliLiter(s) Oral Mucosa every 12 hours  chlorhexidine 2% Cloths 1 Application(s) Topical <User Schedule>  CRRT Treatment    <Continuous>  dexMEDEtomidine Infusion 1 MICROgram(s)/kG/Hr IV Continuous <Continuous>  dextrose 50% Injectable 50 milliLiter(s) IV Push every 15 minutes  DOBUTamine Infusion 3 MICROgram(s)/kG/Min IV Continuous <Continuous>  ganciclovir IVPB 120 milliGRAM(s) IV Intermittent every 24 hours  hydrocortisone sodium succinate Injectable 50 milliGRAM(s) IV Push every 12 hours  insulin regular Infusion 1 Unit(s)/Hr IV Continuous <Continuous>  metroNIDAZOLE  IVPB 500 milliGRAM(s) IV Intermittent every 12 hours  norepinephrine Infusion 0.01 MICROgram(s)/kG/Min IV Continuous <Continuous>  pantoprazole  Injectable 40 milliGRAM(s) IV Push every 12 hours  Phoxillum Filtration BK 4 / 2.5 5000 milliLiter(s) CRRT <Continuous>  PrismaSOL Filtration BGK 4 / 2.5 5000 milliLiter(s) CRRT <Continuous>  PrismaSOL Filtration BGK 4 / 2.5 5000 milliLiter(s) CRRT <Continuous>  sodium bicarbonate  Injectable 50 milliEquivalent(s) IV Push <User Schedule>  thiamine Injectable 100 milliGRAM(s) IV Push daily  trimethoprim / sulfamethoxazole IVPB 160 milliGRAM(s) IV Intermittent daily  vasopressin Infusion 0.01 Unit(s)/Min IV Continuous <Continuous>    PRN Inpatient Medications  levalbuterol Inhalation 0.63 milliGRAM(s) Inhalation every 8 hours PRN      REVIEW OF SYSTEMS  --------------------------------------------------------------------------------  Unable to obtain ROS due to current clinical status.     VITALS/PHYSICAL EXAM  --------------------------------------------------------------------------------  T(C): 36.9 (12-11-24 @ 16:00), Max: 37.5 (12-10-24 @ 20:00)  HR: 76 (12-11-24 @ 17:00) (76 - 129)  BP: --  RR: 26 (12-11-24 @ 16:30) (22 - 37)  SpO2: 96% (12-11-24 @ 17:00) (86% - 98%)  Wt(kg): --        12-10-24 @ 07:01  -  12-11-24 @ 07:00  --------------------------------------------------------  IN: 3973.8 mL / OUT: 4666 mL / NET: -692.2 mL    12-11-24 @ 07:01  -  12-11-24 @ 17:01  --------------------------------------------------------  IN: 2236.6 mL / OUT: 3278 mL / NET: -1041.4 mL      Physical Exam:  Gen: critically ill, intubated and sedated.   Pulm: intubated.   CV: tachycardic, S1S2+  Abd: + distended. +incisions. +NGT in place. +ostomy   : +nichols catheter with dark colored urine  MSK: 2+ pitting above knee including scrotum  Skin: Warm  Access: R IJ non-tunneled HD catheter     LABS/STUDIES  --------------------------------------------------------------------------------              9.8    8.12  >-----------<  13       [12-11-24 @ 12:16]              29.7     138  |  102  |  36  ----------------------------<  172      [12-11-24 @ 12:16]  4.8   |  16  |  1.02        Ca     8.5     [12-11-24 @ 12:16]      Mg     2.5     [12-11-24 @ 12:16]      Phos  3.3     [12-11-24 @ 12:16]    TPro  4.0  /  Alb  3.2  /  TBili  9.7  /  DBili  x   /  AST  27  /  ALT  103  /  AlkPhos  65  [12-11-24 @ 12:16]    PT/INR: PT 24.3 , INR 2.13       [12-11-24 @ 12:16]  PTT: 62.4       [12-11-24 @ 12:16]          [12-10-24 @ 00:29]    Creatinine Trend:  SCr 1.02 [12-11 @ 12:16]  SCr 1.05 [12-11 @ 00:34]  SCr 1.07 [12-10 @ 15:43]  SCr 1.10 [12-10 @ 08:46]  SCr 1.16 [12-10 @ 04:39]      Vitamin D (25OH) <6.0      [11-21-24 @ 08:32]  TSH 1.59      [11-21-24 @ 08:32]  Lipid: chol 114, , HDL 47, LDL --      [04-24-24 @ 06:48]    HBsAb 41.9      [11-15-24 @ 00:41]  HBsAg Nonreact      [11-15-24 @ 00:41]  HBcAb Nonreact      [11-15-24 @ 00:41]  HCV 0.06, Nonreact      [11-15-24 @ 00:41]  HIV Nonreact      [11-15-24 @ 00:41]

## 2024-12-11 NOTE — PROGRESS NOTE ADULT - ATTENDING COMMENTS
Pt still with acidosis and high lactate  Beta hydroxybuterate elevated  Will change pre post filter crrt fluid to prismasol and phox for dialysate - should increase dextrose in serum.   Will also increase dose for acidosis.  Can give D5W + 150meq of bicarb for acidosis.   Unclear etiology but agree with holding linezolid as possible source.  Bowl has been explored and imaged.

## 2024-12-11 NOTE — PHYSICAL THERAPY INITIAL EVALUATION ADULT - GENERAL OBSERVATIONS, REHAB EVAL
Pt received supine in bed in NAD, following ~25% commands, eyes open <25% of the time (+)ICU monitoring lines, vent to trach, swan antonia catheter, CVVHD RIJ, cair boots.
Pt is s/p OLT on 11/15. . Per JAZZ Schmidt, PT can ambulate pt with SWAN. Pt received reclined in chair in NAD, VSS, +ICU monitoring, +SWAN, +ALYSSA drains x3, +NGT, +nichols, pt is A&Ox4, agreeable to PT. SHIVANI vila present for session, pt's sons also present.

## 2024-12-11 NOTE — PROGRESS NOTE ADULT - ASSESSMENT
74 year-old with known coronary artery disease as above presents for liver transplant.  Seen to have chest pain post transplant.  It was atypical of angina and has resolved.  Troponin remained negative.    Now with atrial fibrillation with RVR (11/19) - rate control with beta-blocker as needed, now status post Digoxin load  Converted to sinus rhythm on 12/2.    Rate control with betablocker and Digoxin - uptitrate beta-blocker as tolerated    Patient was septic on Friday, 12/6, and at that time, he was seen to be hyperdynamic with TODD and severe LVOT gradient.  Post ex-lap, patient seen to have newly reduced LVEF.  IABP placed. Inotrope and pressor support. IABP removed 12/10 without significant drop in cardiac output.  Recommend weaning both inotrope and pressors.   Keep MAP >65 mmHg. Monitor output via [place].    Heart Failure follow-up appreciated.

## 2024-12-11 NOTE — PHYSICAL THERAPY INITIAL EVALUATION ADULT - MANUAL MUSCLE TESTING RESULTS, REHAB EVAL
UE & LE all grossly assessed to be at least 3/5/grossly assessed due to
unable to perform a this time 2/2 weakness

## 2024-12-11 NOTE — ADVANCED PRACTICE NURSE CONSULT - RECOMMEDATIONS
Will recommend:  1. Monitor output.  2. Empty pouch when 1/3-1/2 full   3. Change pouching system every 3-4 days & prn leakage  4. Contact ostomy specialists if questions, concerns/issues .  5. Supplies: York 2 1/4" Ceraplus flat skin barrier (#72114), Lianne 2 1/4" drainable pouch (#30533); Accessory products:  stoma paste #[id number], stoma powder (#4778) & Cavilon No sting barrier film wipe (#6074)  Will f/u for ostomy education when appropriate. Pt is currently sedated/ vented.

## 2024-12-11 NOTE — PROGRESS NOTE ADULT - PROBLEM SELECTOR PLAN 3
- s/p liver transplant on 11/15  - abdominal transplant team following  - immunosuppression managed by abdominal transplant

## 2024-12-11 NOTE — PROGRESS NOTE ADULT - SUBJECTIVE AND OBJECTIVE BOX
Subjective  Patient remains intubated.    Objective    Vital signs  T(F): , Max: 99.5 (12-10-24 @ 16:00)  HR: 88 (12-11-24 @ 04:00)  BP: --  SpO2: 95% (12-11-24 @ 04:00)  Wt(kg): --    Output     OUT:    Bulb (mL): 2015 mL    Bulb (mL): 850 mL    Indwelling Catheter - Urethral (mL): 105 mL    VAC (Vacuum Assisted Closure) System (mL): 200 mL  Total OUT: 3170 mL    Total NET: -3170 mL      OUT:    Bulb (mL): 1550 mL    Bulb (mL): 900 mL    Indwelling Catheter - Urethral (mL): 55 mL    Nasogastric/Oral tube (mL): 100 mL  Total OUT: 2605 mL    Total NET: -2605 mL          Gen: NAD  Abd: soft, nontender, nondistended  : Scrotal exam unchanged and still very edematous and erythematous. R testicle able to palpated and is soft, unable to palpate L testicle.     Labs      12-11 @ 00:34    WBC --    / Hct --    / SCr 1.05     12-11 @ 00:32    WBC 9.56  / Hct 34.1  / SCr --           Culture - Blood (collected 12-07-24 @ 15:30)  Source: .Blood BLOOD  Preliminary Report (12-10-24 @ 19:01):    No growth at 72 Hours    Culture - Body Fluid with Gram Stain (collected 12-07-24 @ 11:18)  Source: Body Fluid  Gram Stain (12-07-24 @ 18:22):    polymorphonuclear leukocytes seen    No organisms seen    by cytocentrifuge  Preliminary Report (12-08-24 @ 10:36):    No growth to date.    Culture - Blood (collected 12-06-24 @ 13:30)  Source: .Blood BLOOD  Gram Stain (12-07-24 @ 03:44):    Growth in aerobic bottle: Gram Negative Rods    Growth in anaerobic bottle: Gram Negative Rods  Final Report (12-08-24 @ 13:18):    Growth in aerobic and anaerobic bottles: Escherichia coli    See previous culture [id number]    Culture - Blood (collected 12-06-24 @ 13:25)  Source: .Blood BLOOD  Gram Stain (12-07-24 @ 03:07):    Growth in aerobic and anaerobic bottles: Gram Negative Rods  Final Report (12-08-24 @ 13:17):    Growth in aerobic and anaerobic bottles: Escherichia coli    Direct identification is available within approximately 3-5    hours either by Blood Panel Multiplexed PCR or Direct    MALDI-TOF. Details: [email or web address]  Organism: Blood Culture PCR  Escherichia coli (12-08-24 @ 13:17)  Organism: Escherichia coli (12-08-24 @ 13:17)      Method Type: ESAU      -  Ampicillin: R >16 These ampicillin results predict results for amoxicillin      -  Ampicillin/Sulbactam: R >16/8      -  Aztreonam: I 8      -  Cefazolin: R >16      -  Cefepime: S <=2      -  Cefoxitin: R >16      -  Ceftriaxone: S <=1      -  Ciprofloxacin: S <=0.25      -  Ertapenem: S <=0.5      -  Gentamicin: S <=2      -  Imipenem: S <=1      -  Levofloxacin: S <=0.5      -  Meropenem: S <=1      -  Piperacillin/Tazobactam: SDD 16      -  Tobramycin: S <=2      -  Trimethoprim/Sulfamethoxazole: R >2/38  Organism: Blood Culture PCR (12-08-24 @ 13:17)      Method Type: PCR      -  Escherichia coli: Detec    Culture - Blood (collected 12-05-24 @ 14:15)  Source: .Blood BLOOD  Final Report (12-10-24 @ 18:00):    No growth at 5 days    Culture - Blood (collected 12-05-24 @ 14:00)  Source: .Blood BLOOD  Final Report (12-10-24 @ 18:00):    No growth at 5 days        Urine Cx: ?  Blood Cx: ?    Imaging

## 2024-12-11 NOTE — PHYSICAL THERAPY INITIAL EVALUATION ADULT - ADDITIONAL COMMENTS
Pt lives with children in a private house with 5 steps to enter with + 1 rail, then 1 flight inside with +1 rail PTA pt was independent with all mobility & ADLs
PTA pt was independent with all mobility & ADLs

## 2024-12-11 NOTE — PROGRESS NOTE ADULT - SUBJECTIVE AND OBJECTIVE BOX
Patient seen and examined at the bedside.    Remained critically ill on continuous ICU monitoring.    OBJECTIVE:  Vital Signs Last 24 Hrs  T(C): 36.9 (11 Dec 2024 16:00), Max: 37.5 (10 Dec 2024 20:00)  T(F): 98.4 (11 Dec 2024 16:00), Max: 99.5 (10 Dec 2024 20:00)  HR: 82 (11 Dec 2024 19:00) (75 - 129)  BP: --  BP(mean): --  RR: 26 (11 Dec 2024 19:00) (23 - 37)  SpO2: 95% (11 Dec 2024 19:00) (86% - 98%)    Parameters below as of 11 Dec 2024 16:00  Patient On (Oxygen Delivery Method): ventilator, CPAP 10/7      Physical Exam:   General: Intubated   Neurology: Sedated, able to follow commands off sedation  Eyes: bilateral pupils equal and reactive   ENT/Neck: Neck supple, trachea midline, No JVD   Respiratory: Clear bilaterally   CV: S1S2, no murmurs        [x] Sternal dressing, [x] Bilateral Abd Bulb         [x] Afib  Abdominal: Soft, NT, ND +BS   Extremities: 1-2+ pedal edema noted, + peripheral pulses   Skin: No Rashes, Hematoma, Ecchymosis                           Assessment:  73 yo M with cardiogenic shock s/p IABP insertion, total abdominal colectomy with ileostomy on 12/7/2024.   S/P IABP removal on 12/10    Cardiogenic shock  Hypovolemia  Lactic acidosis  Acute respiratory failure  Acute liver injury, transaminitis, hyperbilirubinemia  Acute kidney injury  Hypokalemia  Hypocalcemia  Acute blood loss anemia  Thrombocytopenia  E coli bacteremia      Plan:   ***Neuro***  [x] Sedated with [x] Precedex for comfort/vent synchrony.   Post operative neuro assessment     ***Cardiovascular***  Invasive hemodynamic monitoring, assess perfusion indices   Afib / CVP 16 / MAP 72 / PAP 31 / CI 3.2 / Hct 30.0/ Lactate 6.8  [x] Amiodarone 1 mG/Min [x] Dobutamine 3 mcgs/kG/Min [x] Levophed 0.01 mcgs/kG/Min - off [x] Vasopressin 0.01 Units/Min   [x] s/p IABP removal 12/10  Volume: [x] 5 Cryo [x] 2u plts   100ccs Albumin for hypovolemia / volume removal   Reassessment of hemodynamics post resuscitation  Serial EKG and cardiac enzymes   New wound vac placed today 12/11    ***Pulmonary***  [x] Jacqui 9 ppms - weaning to 5 ppms  Postoperative acute respiratory failure  Titration of FiO2 and PEEP, follow SpO2, CXR, blood gasses   Continue bronchodilators as needed    Mode: AC/ CMV (Assist Control/ Continuous Mandatory Ventilation)  RR (machine): 14  TV (machine): 500  FiO2: 40  PEEP: 7  ITime: 1  MAP: 10  PIP: 18              ***GI***  s/p liver transplant with acute liver injury from sepsis - trend LFTs.  s/p total abdominal colectomy  [x] Trickle TFs @ goal of 10ccs  Remains in discontinuity.  VAC with high output but thin drainage - ongoing resuscitation to keep I/O close to even.  Return to OR 12/10 for re-exploration, possible stoma, possible closure.  [x] Protonix for stress ulcer ppx    ***Renal***  [x] MORAIMA - on CRRT.  Continue to monitor I/Os, BUN/Creatinine.   Replete lytes PRN  Castro present  Hypocalcemia - on Calcium Chloride infusion, trend levels    ***ID***  Merrem for e coli bacteremia - will discuss possible narrowing with ID.  Sepsis coverage with Caspofungin, Linezolid d/samantha.  CMV prophylaxis with Ganciclovir.  Trend leukopenia s/p Filgrastin.  Bactrim for prophylaxis.  Cefepime for bacteremia    Flagyl added for ABD coverage     ***Endocrine***  [x] Stress Hyperglycemia: HbA1c 5.6%                - [x] Insulin gtt             - Need tight glycemic control to prevent wound infection.                Patient requires continuous monitoring with bedside rhythm monitoring, pulse oximetry monitoring, and continuous central venous and arterial pressure monitoring; and intermittent blood gas analysis. Care plan discussed with the ICU care team.   Patient remained critical, at risk for life threatening decompensation.    I have spent 65 minutes providing critical care management to this patient.    By signing my name below, I, Ashleigh Zimmerman, attest that this documentation has been prepared under the direction and in the presence of ___  Electronically signed: Theresa Santana, 12-11-24 @ 19:59    I, ___ , personally performed the services described in this documentation. all medical record entries made by the scribe were at my direction and in my presence. I have reviewed the chart and agree that the record reflects my personal performance and is accurate and complete  Electronically signed: ___ Patient seen and examined at the bedside.    Remained critically ill on continuous ICU monitoring.    OBJECTIVE:  Vital Signs Last 24 Hrs  T(C): 36.9 (11 Dec 2024 16:00), Max: 37.5 (10 Dec 2024 20:00)  T(F): 98.4 (11 Dec 2024 16:00), Max: 99.5 (10 Dec 2024 20:00)  HR: 82 (11 Dec 2024 19:00) (75 - 129)  RR: 26 (11 Dec 2024 19:00) (23 - 37)  SpO2: 95% (11 Dec 2024 19:00) (86% - 98%)    Parameters below as of 11 Dec 2024 16:00  Patient On (Oxygen Delivery Method): ventilator, CPAP 10/7      Physical Exam:   General: Intubated   Neurology: Sedated, able to follow commands off sedation  Eyes: bilateral pupils equal and reactive   ENT/Neck: Neck supple, trachea midline, No JVD   Respiratory: Clear bilaterally   CV: S1S2, no murmurs        [x] Abdominal VAC, [x] Bilateral Abd Bulb         [x] Afib  Abdominal: Soft, NT, ND +BS   Extremities: 1-2+ pedal edema noted, + peripheral pulses   Skin: No Rashes, Hematoma, Ecchymosis                           Assessment:  75 yo M with cardiogenic shock s/p IABP insertion, total abdominal colectomy with ileostomy on 12/7/2024.   S/P IABP removal on 12/10    Cardiogenic shock  Hypovolemia  Lactic acidosis  Acute respiratory failure  Acute liver injury, transaminitis, hyperbilirubinemia  Acute kidney injury  Hypokalemia  Hypocalcemia  Acute blood loss anemia  Thrombocytopenia  E coli bacteremia      Plan:   ***Neuro***  [x] Sedated with [x] Precedex for comfort/vent synchrony.   Post operative neuro assessment     ***Cardiovascular***  Invasive hemodynamic monitoring, assess perfusion indices   Afib / CVP 16 / MAP 72 / PAP 31 / CI 3.2 / Hct 30.0/ Lactate 6.8  [x] Amiodarone 1 mG/Min [x] Dobutamine 3 mcgs/kG/Min [x] Levophed 0.01 mcgs/kG/Min - off [x] Vasopressin 0.01 Units/Min   [x] s/p IABP removal 12/10  Volume: [x] 5 Cryo [x] 2u plts   100ccs Albumin for hypovolemia / volume removal   Reassessment of hemodynamics post resuscitation  Serial EKG and cardiac enzymes   New wound vac placed today 12/11    ***Pulmonary***  [x] Jacqui 9 ppms - plan to wean to 5 ppms by am   Postoperative acute respiratory failure  Titration of FiO2 and PEEP, follow SpO2, CXR, blood gasses   Continue bronchodilators as needed    Mode: AC/ CMV (Assist Control/ Continuous Mandatory Ventilation)  RR (machine): 14  TV (machine): 500  FiO2: 40  PEEP: 7  ITime: 1  MAP: 10  PIP: 18              ***GI***  s/p liver transplant with acute liver injury from sepsis - trend LFTs.  s/p total abdominal colectomy  [x] Trickle TFs @ goal of 10ccs  Remains in discontinuity.  VAC with high output but thin drainage - ongoing resuscitation to keep I/O close to even.  Return to OR 12/10 for re-exploration, possible stoma, possible closure.  [x] Protonix for stress ulcer ppx    ***Renal***  [x] OMRAIMA - on CRRT.  Continue to monitor I/Os, BUN/Creatinine.   Replete lytes PRN  Matthew present  Hypocalcemia - on Calcium Chloride infusion, trend levels    ***ID***  Merrem for e coli bacteremia - will discuss possible narrowing with ID.  Caspo d/c and deescalated to Fluconazole    Linezolid d/samantha.  CMV prophylaxis with Ganciclovir.  Trend leukopenia s/p Filgrastin.  Bactrim for prophylaxis, switched to Mepron d/t renal fx   Cefepime for bacteremia    Flagyl added for ABD coverage     ***Endocrine***  [x] Stress Hyperglycemia: HbA1c 5.6%                - [x] Insulin gtt             - Need tight glycemic control to prevent wound infection.                Patient requires continuous monitoring with bedside rhythm monitoring, pulse oximetry monitoring, and continuous central venous and arterial pressure monitoring; and intermittent blood gas analysis. Care plan discussed with the ICU care team.   Patient remained critical, at risk for life threatening decompensation.    I have spent 65 minutes providing critical care management to this patient.    By signing my name below, I, Ashleigh Zimmerman, attest that this documentation has been prepared under the direction and in the presence of Elmer Lopez NP   Electronically signed: Theresa Santana, 12-11-24 @ 19:59    I, Elmer Lopez, personally performed the services described in this documentation. all medical record entries made by the scribe were at my direction and in my presence. I have reviewed the chart and agree that the record reflects my personal performance and is accurate and complete  Electronically signed: Elmer Lopez, NP

## 2024-12-11 NOTE — PROGRESS NOTE ADULT - NS ATTEND AMEND GEN_ALL_CORE FT
POD#2 s/p creation ileostomy and closure of abdomen; POD# 26 s/p OLT  LFT's slowly improving, TBili lagging and slightly higher  remains intubated on dobutamine and amiodarone. Aortic balloon pump removed  echo showing decreased EF. cont to monitor  Immunosuppression - Prograf and cellcept held. Cont stress dose hydrocortisone  Start trickle feeds via NGT  sepsis improving but remains critically ill. cont abx as per ID, f/u cultures and pathology.  plan to place VAC over abdominal wound. Cont albumin replacement for large volume ascites drainage. POD#2 s/p creation ileostomy and closure of abdomen; POD# 26 s/p OLT  LFT's slowly improving, TBili lagging and slightly higher  remains intubated on dobutamine and amiodarone. Aortic balloon pump removed  echo showing decreased EF. cont to monitor  Immunosuppression - Prograf and cellcept held. Cont stress dose hydrocortisone  Start trickle feeds via NGT  sepsis improving but remains critically ill. cont abx as per ID, f/u cultures and pathology.  plan to place VAC over abdominal wound. Cont albumin replacement for large volume ascites drainage

## 2024-12-11 NOTE — ADVANCED PRACTICE NURSE CONSULT - ASSESSMENT
Chart reviewed & events noted. In at bedside with PT wound care Sharon Morgan for initial vac dsg & complete ostomy pouching system changes. (pls see separate notes).Pt in bed sedated /vented, son at bedside. Introduced role of COCN & purpose of visit. Complete pouching system changed. Son is a surgeon reports familiarity with vac & ostomy. Stoma 1 1/2" red & viable, scant serousanguinous drainage noted in pouch.  Peristomal skin & mucocutaneous junction intact. + creases/skin indents at 3&9 o'clock. Repouched w/ 2 1/4" flat skin barrier, bead of stoma paste applied to skin creases to level surface & at the back of skin barrier near opening to caulk & drainable pouch. Supplies with pattern at bedside.

## 2024-12-11 NOTE — PHYSICAL THERAPY INITIAL EVALUATION ADULT - MODALITIES TREATMENT COMMENTS
midline and transferse incision saturated ABD pads. Incision with staples in tact, measures 32.0cm in legnth and midline incision 20.0cm in length, with one small intermittent opening @ 12:00 x 2.0cm, @ 3:00 x 2.0cm, @ 6:00 x 2.0cm and @ 6:00 x 2.0cm.

## 2024-12-11 NOTE — PHYSICAL THERAPY INITIAL EVALUATION ADULT - STRENGTHENING, PT EVAL
GOAL: Patient will improve strength by 1/4 grade in 4 weeks to improve overall functional mobility including gait, transfers, bed mobility and decrease risk of falls.

## 2024-12-11 NOTE — PROGRESS NOTE ADULT - PROBLEM SELECTOR PLAN 2
- IABP removed on 12/10  - remains on  @ 4, continue to wean as tolerated   - Vaso gtt adjusted by CTU team   - continue to wean off Jacqui  - most recent ECHO showed EF 20%, plan to repeat ECHO today  - continue to monitor end organ function and perfusion markers  - remains on CVVHD for volume removal

## 2024-12-11 NOTE — PROGRESS NOTE ADULT - SUBJECTIVE AND OBJECTIVE BOX
Follow Up:      Interval History:    REVIEW OF SYSTEMS  [  ] ROS unobtainable because:    [  ] All other systems negative except as noted below    Constitutional:  [ ] fever [ ] chills  [ ] weight loss  [ ] weakness  Skin:  [ ] rash [ ] phlebitis	  Eyes: [ ] icterus [ ] pain  [ ] discharge	  ENMT: [ ] sore throat  [ ] thrush [ ] ulcers [ ] exudates  Respiratory: [ ] dyspnea [ ] hemoptysis [ ] cough [ ] sputum	  Cardiovascular:  [ ] chest pain [ ] palpitations [ ] edema	  Gastrointestinal:  [ ] nausea [ ] vomiting [ ] diarrhea [ ] constipation [ ] pain	  Genitourinary:  [ ] dysuria [ ] frequency [ ] hematuria [ ] discharge [ ] flank pain  [ ] incontinence  Musculoskeletal:  [ ] myalgias [ ] arthralgias [ ] arthritis  [ ] back pain  Neurological:  [ ] headache [ ] seizures  [ ] confusion/altered mental status    Allergies  No Known Allergies        ANTIMICROBIALS:  caspofungin IVPB 50 every 24 hours  cefepime   IVPB    cefepime   IVPB 1000 every 8 hours  ganciclovir IVPB 120 every 24 hours  metroNIDAZOLE  IVPB 500 every 12 hours  trimethoprim / sulfamethoxazole IVPB 160 daily      OTHER MEDS:  MEDICATIONS  (STANDING):  aMIOdarone Infusion 1 <Continuous>  dexMEDEtomidine Infusion 1 <Continuous>  dextrose 50% Injectable 50 every 15 minutes  DOBUTamine Infusion 3 <Continuous>  hydrocortisone sodium succinate Injectable 50 every 12 hours  insulin regular Infusion 1 <Continuous>  levalbuterol Inhalation 0.63 every 8 hours PRN  norepinephrine Infusion 0.01 <Continuous>  pantoprazole  Injectable 40 every 12 hours  vasopressin Infusion 0.01 <Continuous>      Vital Signs Last 24 Hrs  T(C): 37 (11 Dec 2024 12:00), Max: 37.5 (10 Dec 2024 20:00)  T(F): 98.6 (11 Dec 2024 12:00), Max: 99.5 (10 Dec 2024 20:00)  HR: 93 (11 Dec 2024 15:10) (77 - 129)  BP: --  BP(mean): --  RR: 24 (11 Dec 2024 14:45) (22 - 32)  SpO2: 94% (11 Dec 2024 15:10) (86% - 98%)    Parameters below as of 11 Dec 2024 12:00  Patient On (Oxygen Delivery Method): ventilator, CPAP 10/7        PHYSICAL EXAMINATION:  General: Alert and Awake, NAD  HEENT: PERRL, EOMI  Neck: Supple  Cardiac: RRR, No M/R/G  Resp: CTAB, No Wh/Rh/Ra  Abdomen: NBS, NT/ND, No HSM, No rigidity or guarding  MSK: No LE edema. No Calf tenderness  : No nichols  Skin: No rashes or lesions. Skin is warm and dry to the touch.   Neuro: Alert and Awake. CN 2-12 Grossly intact. Moves all four extremities spontaneously.  Psych: Calm, Pleasant, Cooperative                          9.8    8.12  )-----------( 13       ( 11 Dec 2024 12:16 )             29.7       12-11    138  |  102  |  36[H]  ----------------------------<  172[H]  4.8   |  16[L]  |  1.02    Ca    8.5      11 Dec 2024 12:16  Phos  3.3     12-11  Mg     2.5     12-11    TPro  4.0[L]  /  Alb  3.2[L]  /  TBili  9.7[H]  /  DBili  x   /  AST  27  /  ALT  103[H]  /  AlkPhos  65  12-11      Urinalysis Basic - ( 11 Dec 2024 12:16 )    Color: x / Appearance: x / SG: x / pH: x  Gluc: 172 mg/dL / Ketone: x  / Bili: x / Urobili: x   Blood: x / Protein: x / Nitrite: x   Leuk Esterase: x / RBC: x / WBC x   Sq Epi: x / Non Sq Epi: x / Bacteria: x        MICROBIOLOGY:  v  .Blood BLOOD  12-07-24   No growth at 72 Hours  --  --      Body Fluid  12-07-24   No growth to date.  --    polymorphonuclear leukocytes seen  No organisms seen  by cytocentrifuge      .Blood BLOOD  12-06-24   Growth in aerobic and anaerobic bottles: Escherichia coli  See previous culture [id number]  --    Growth in aerobic bottle: Gram Negative Rods  Growth in anaerobic bottle: Gram Negative Rods      .Blood BLOOD  12-06-24   Growth in aerobic and anaerobic bottles: Escherichia coli  Direct identification is available within approximately 3-5  hours either by Blood Panel Multiplexed PCR or Direct  MALDI-TOF. Details: [email or web address]  --  Blood Culture PCR  Escherichia coli      .Blood BLOOD  12-05-24   No growth at 5 days  --  --      .Blood BLOOD  12-05-24   No growth at 5 days  --  --      .Stool  12-01-24   No enteric pathogens isolated.  (Stool culture examined for Salmonella,  Shigella, Campylobacter, Aeromonas, Plesiomonas,  Vibrio, E.coli O157 and Yersinia)  --  --      .Blood BLOOD  11-26-24   No growth at 5 days  --  --      .Blood BLOOD  11-26-24   No growth at 5 days  --  --      Bronchial  11-24-24   Few Candida glabrata  --  Candida (Torulopsis) glabrata      .Blood BLOOD  11-24-24   No growth at 5 days  --  --      .Blood BLOOD  11-24-24   No growth at 5 days  --  --      Combi-Cath  11-23-24   Commensal charity consistent with body site  --    Moderate polymorphonuclear leukocytes per low power field  No squamous epithelial cells per low power field  No organisms seen      .Blood BLOOD  11-22-24   No growth at 5 days  --  --      .Blood BLOOD  11-20-24   No growth at 5 days  --  --      .Blood BLOOD  11-20-24   No growth at 5 days  --  --      Body Fluid  11-15-24   No growth at 5 days  --    No polymorphonuclear leukocytes seen  No organisms seen  by cytocentrifuge        Toxoplasma IgG Screen: 78.00 IU/mL (11-15-24 @ 00:41)  CMV IgG Antibody: 1.50 U/mL (11-15-24 @ 00:41)    CMVPCR Log: Det <1.54 Assay Dynamic Range: 34.5 to 1.0E+07 IU/mL (1.54 to 7.00 Log10 IU/mL)  Assay lower limit of quantification (LLOQ) is 34.5 IU/mL (1.54 Log10  IU/mL)  CMV DNA detected below the LLOQ will be reported as Detected < 34.5 IU/mL  (<1.54 Log10 IU/mL)  Nydia Cytomegalovirus (CMV) is an FDA-cleared quantitative test that  enables the detection and quantitation of CMV DNA in EDTA plasma of  infected transplant patients on the nydia 8800 system. This test was  verified by [employer]. Results should be interpreted  with consideration of all clinical findings and laboratory findings and  should not form the sole basis for a diagnosis or treatment decision. [id number]/mL (12-06 @ 15:31)        RADIOLOGY:    <The imaging below has been reviewed and visualized by me independently. Findings as detailed in report below> Follow Up:  bacteremia.     Interval History: afebrile. remains intubated.     REVIEW OF SYSTEMS  [ x ] ROS unobtainable because:  intubated  [  ] All other systems negative except as noted below    Constitutional:  [ ] fever [ ] chills  [ ] weight loss  [ ] weakness  Skin:  [ ] rash [ ] phlebitis	  Eyes: [ ] icterus [ ] pain  [ ] discharge	  ENMT: [ ] sore throat  [ ] thrush [ ] ulcers [ ] exudates  Respiratory: [ ] dyspnea [ ] hemoptysis [ ] cough [ ] sputum	  Cardiovascular:  [ ] chest pain [ ] palpitations [ ] edema	  Gastrointestinal:  [ ] nausea [ ] vomiting [ ] diarrhea [ ] constipation [ ] pain	  Genitourinary:  [ ] dysuria [ ] frequency [ ] hematuria [ ] discharge [ ] flank pain  [ ] incontinence  Musculoskeletal:  [ ] myalgias [ ] arthralgias [ ] arthritis  [ ] back pain  Neurological:  [ ] headache [ ] seizures  [ ] confusion/altered mental status    Allergies  No Known Allergies        ANTIMICROBIALS:  caspofungin IVPB 50 every 24 hours  cefepime   IVPB    cefepime   IVPB 1000 every 8 hours  ganciclovir IVPB 120 every 24 hours  metroNIDAZOLE  IVPB 500 every 12 hours  trimethoprim / sulfamethoxazole IVPB 160 daily      OTHER MEDS:  MEDICATIONS  (STANDING):  aMIOdarone Infusion 1 <Continuous>  dexMEDEtomidine Infusion 1 <Continuous>  dextrose 50% Injectable 50 every 15 minutes  DOBUTamine Infusion 3 <Continuous>  hydrocortisone sodium succinate Injectable 50 every 12 hours  insulin regular Infusion 1 <Continuous>  levalbuterol Inhalation 0.63 every 8 hours PRN  norepinephrine Infusion 0.01 <Continuous>  pantoprazole  Injectable 40 every 12 hours  vasopressin Infusion 0.01 <Continuous>      Vital Signs Last 24 Hrs  T(C): 37 (11 Dec 2024 12:00), Max: 37.5 (10 Dec 2024 20:00)  T(F): 98.6 (11 Dec 2024 12:00), Max: 99.5 (10 Dec 2024 20:00)  HR: 93 (11 Dec 2024 15:10) (77 - 129)  BP: --  BP(mean): --  RR: 24 (11 Dec 2024 14:45) (22 - 32)  SpO2: 94% (11 Dec 2024 15:10) (86% - 98%)    Parameters below as of 11 Dec 2024 12:00  Patient On (Oxygen Delivery Method): ventilator, CPAP 10/7      PHYSICAL EXAMINATION:  General: Intubated and Sedated  HEENT: +ETT  Neck: Supple  Cardiac: RRR, No M/R/G  Resp: CTAB, No Wh/Rh/Ra  Abdomen: +ileostomy, dressing over surgical site, NBS, NT/ND, No HSM, No rigidity or guarding  MSK: No LE edema. No Calf tenderness  Skin: No rashes or lesions. Skin is warm and dry to the touch.   Neuro: Intubated and Sedated  : +Testicular and penile edema with erythema. Cool to touch without induration.   Psych: Unable to assess - intubated and sedated                          9.8    8.12  )-----------( 13       ( 11 Dec 2024 12:16 )             29.7       12-11    138  |  102  |  36[H]  ----------------------------<  172[H]  4.8   |  16[L]  |  1.02    Ca    8.5      11 Dec 2024 12:16  Phos  3.3     12-11  Mg     2.5     12-11    TPro  4.0[L]  /  Alb  3.2[L]  /  TBili  9.7[H]  /  DBili  x   /  AST  27  /  ALT  103[H]  /  AlkPhos  65  12-11      Urinalysis Basic - ( 11 Dec 2024 12:16 )    Color: x / Appearance: x / SG: x / pH: x  Gluc: 172 mg/dL / Ketone: x  / Bili: x / Urobili: x   Blood: x / Protein: x / Nitrite: x   Leuk Esterase: x / RBC: x / WBC x   Sq Epi: x / Non Sq Epi: x / Bacteria: x        MICROBIOLOGY:  v  .Blood BLOOD  12-07-24   No growth at 72 Hours  --  --      Body Fluid  12-07-24   No growth to date.  --    polymorphonuclear leukocytes seen  No organisms seen  by cytocentrifuge      .Blood BLOOD  12-06-24   Growth in aerobic and anaerobic bottles: Escherichia coli  See previous culture [id number]  --    Growth in aerobic bottle: Gram Negative Rods  Growth in anaerobic bottle: Gram Negative Rods      .Blood BLOOD  12-06-24   Growth in aerobic and anaerobic bottles: Escherichia coli  Direct identification is available within approximately 3-5  hours either by Blood Panel Multiplexed PCR or Direct  MALDI-TOF. Details: [email or web address]  --  Blood Culture PCR  Escherichia coli      .Blood BLOOD  12-05-24   No growth at 5 days  --  --      .Blood BLOOD  12-05-24   No growth at 5 days  --  --      .Stool  12-01-24   No enteric pathogens isolated.  (Stool culture examined for Salmonella,  Shigella, Campylobacter, Aeromonas, Plesiomonas,  Vibrio, E.coli O157 and Yersinia)  --  --      .Blood BLOOD  11-26-24   No growth at 5 days  --  --      .Blood BLOOD  11-26-24   No growth at 5 days  --  --      Bronchial  11-24-24   Few Candida glabrata  --  Candida (Torulopsis) glabrata      .Blood BLOOD  11-24-24   No growth at 5 days  --  --      .Blood BLOOD  11-24-24   No growth at 5 days  --  --      Combi-Cath  11-23-24   Commensal charity consistent with body site  --    Moderate polymorphonuclear leukocytes per low power field  No squamous epithelial cells per low power field  No organisms seen      .Blood BLOOD  11-22-24   No growth at 5 days  --  --      .Blood BLOOD  11-20-24   No growth at 5 days  --  --      .Blood BLOOD  11-20-24   No growth at 5 days  --  --      Body Fluid  11-15-24   No growth at 5 days  --    No polymorphonuclear leukocytes seen  No organisms seen  by cytocentrifuge        Toxoplasma IgG Screen: 78.00 IU/mL (11-15-24 @ 00:41)  CMV IgG Antibody: 1.50 U/mL (11-15-24 @ 00:41)    CMVPCR Log: Det <1.54 Assay Dynamic Range: 34.5 to 1.0E+07 IU/mL (1.54 to 7.00 Log10 IU/mL)  Assay lower limit of quantification (LLOQ) is 34.5 IU/mL (1.54 Log10  IU/mL)  CMV DNA detected below the LLOQ will be reported as Detected < 34.5 IU/mL  (<1.54 Log10 IU/mL)  Nydia Cytomegalovirus (CMV) is an FDA-cleared quantitative test that  enables the detection and quantitation of CMV DNA in EDTA plasma of  infected transplant patients on the nydia 8800 system. This test was  verified by [employer]. Results should be interpreted  with consideration of all clinical findings and laboratory findings and  should not form the sole basis for a diagnosis or treatment decision. Ikr82FV/mL (12-06 @ 15:31)        RADIOLOGY:    <The imaging below has been reviewed and visualized by me independently. Findings as detailed in report below>    < from: Xray Chest 1 View- PORTABLE-Routine (Xray Chest 1 View- PORTABLE-Routine in AM.) (12.11.24 @ 02:55) >  IMPRESSION:    Lines and tubes as stated above.    Mild pulmonary congestion, trace left-sided pleural effusion. No   pneumothorax.    < end of copied text >

## 2024-12-11 NOTE — PHYSICAL THERAPY INITIAL EVALUATION ADULT - LIVES WITH, PROFILE
children/spouse
house with 5 steps to enter with + 1 rail, then 1 flight inside with +1 rail/children/spouse

## 2024-12-11 NOTE — PHYSICAL THERAPY INITIAL EVALUATION ADULT - TRANSFER TRAINING, PT EVAL
Transfer goal to be updated pending further participation and improved arousal
GOAL: Pt will perform all transfer activities independently in 2 weeks.

## 2024-12-11 NOTE — PROGRESS NOTE ADULT - ASSESSMENT
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 Postop course was complicated by Ileus and ischemic colon s/p total colectomy 12/7. His course was further complicated by septic and cardiogenic shock,  requiring IABP to be inserted from 12/7-12/10. He was found to have E Coli bacteremia for which he was placed on IV abx. He was noted to have worsening MORAIMA requiring CVVHD, and remains on at this time. His most recent ECHO was concerning for reduced EF  of 20%. Heart Failure was consulted for further management. At this time he remains on  +/- pressors for support.       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 Postop course was complicated by Ileus and ischemic colon s/p total colectomy 12/7. His course was further complicated by septic and cardiogenic shock,  requiring IABP to be inserted from 12/7-12/10. He was found to have E Coli bacteremia for which he was placed on IV abx. He was noted to have worsening MORAIMA requiring CVVHD, and remains on at this time. His initial ECHO was concerning for reduced EF  of 20%. Heart Failure was consulted for further management. He remains on  gtt with normal cardiac indexes. the suspicion is that he had cardiomyopathy associated to sepsis/SIRS. He has a pending repeat echo ordered.       Bedside hemos:   12/11  @ 4, Vaso @ 0.02, Amio @ 0.5, Jacqui @ 10 ppm, /46/66, PAP 37/15/34, CVP 9-10, Wedge 11, CO/CI 7.59/3.51

## 2024-12-11 NOTE — PROGRESS NOTE ADULT - NS ATTEND AMEND GEN_ALL_CORE FT
Pt remains critically ill in setting of septic shock, persistent lactic acidemia and sepsis CMP.   HE is on  gtt @ 4 mcg/kg/min with good bedside hemodynamics.   His repeat TTE shows improvement of LVEF to 55-60%.   Should continue to wean  gtt as able.   Continue supportive care as per liver tx and ICU teams.   Continue excellent care by Dr. Mitesh Quintanilla.  We will be available if there are any further questions/concerns.

## 2024-12-11 NOTE — PHYSICAL THERAPY INITIAL EVALUATION ADULT - GAIT TRAINING, PT EVAL
GOAL: Pt will ambulate 300ft independently in 2 weeks.
Gait goal to be updated pending further participation and improved arousal

## 2024-12-11 NOTE — PROGRESS NOTE ADULT - ASSESSMENT
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 Post-op course c/b worsening mental status and acute hypoxic respiratory failure requiring multiple intubations/extubations, currently intubated (as of 12/7/2024) and cardiogenic shock s/p Percutaneous insertion of an intra-aortic balloon pump and septic shock/colonic ischemia s/p total abdominal colectomy with ileostomy on 12/7/24,  s/p ex-lap w/ileostomy on 12/9/24. Urology consulted for scrotal edema w/ large skin breakdown. The symptom start one week ago and getting worse recent two days with large area skin breakdown and redness on the whole scrotal area, as well the bilateral groin and internal thigh. Castro in place with clear yellowish colored urine.   In CTU, pt WBC 7, H/H 10/32, Cr 1.29, Lactate 8.7. blood culture on 12/6 growth E. coli, and no growth on 12/7. Patient off pressor currently.    Scrotal edema & skin breakdown possibly 2/2 intra-abdominal fluid tracking vs third-spacing from cardiogenic shock    Recommendations:   - No acute urologic intervention indicated   - Scrotal elevation  - Serial examination of scrotum  - Ultrasound scrotum -> US reviewed and consistent with significant with soft tissue edema. US notes that no arterial flow visualized to testicles bilaterally which is likely 2/2 artifact from significant edema. Low concern for testicular ischemia given that when R testicle is palpated it is soft. Would recommend repeating US tomorrow to monitor for flow.   - Wound care consult   - Rest of care per primary team  - Discussed with Dr. Collier    Plan discussed w/ Dr. Benito Collier  Urology

## 2024-12-11 NOTE — PROGRESS NOTE ADULT - ASSESSMENT
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     s/p OLT 11/15 with complicated post op course    [] Septic shock/Cardiogenic shock  [] s/p Colectomy 12/7 POD#4  [] RTOR for closure and Ileostomy creation   [] IAPB 12/7- removed 12/10: CTICU on board  - E coli Bacteremia (12/6) - on jeniffer/linezoid/ caspo. -> switched to  cefepime/ flagyl. Received Tobra x 1 12/6    - Neutropenia: received Neupogen 480mcg x2  - MORAIMA: CRRT started 12/7, low u/o  - f/u CMV PCR  - trend lactate    [] s/p OLT POD #25   - trend LFT's  - Diet: NPO, IVF  - Pain management: avoid narcotics  - Bowel regimen  - Strict I&Os, nichols, wound vac  - US: L brachial DVT   - daily PT     [ ] Immunosuppression  - Tacrolimus stopped 11/18 in setting of AMS/Seizure, Started Cyclo 11/24  - Cyclo held, MMF held, on stress dose steroids  - PPx: Gancyclovir, bactrim    [] lleus   -@ home on Linzess  - imaging with distended colon (likely opioid induced)  - s/p Neostigmine x 2  - s/p Relistor, last dose 12/1  - s/p colectomy  (see above)  - Daily AXR     [] CMV viremia  - f/u repeat CMV PCR  - currently on Gancyclovir     [ ] AMS  - Reintubated 11/20 Aspiration/hypoxia, extubated 11/24->[id number]/24--> extubated 11/26 -> [id number]/7  - EEG 11/30 negative, Neurology following  - BH following   - off tacro  - on keppra    [ ] HTN/ pAFib  - On Amiodarone  - metoprolol held (hypotensive)   - Eliquis 5mg bid - held 12/6.   - Dr. 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     s/p OLT 11/15 with complicated post op course    [] Septic shock/Cardiogenic shock  [] s/p Colectomy 12/7 POD#4  [] RTOR for closure and Ileostomy creation   [] IAPB 12/7- removed 12/10: CTICU on board  - E coli Bacteremia (12/6) - on jeniffer/caspo. -> switched to  cefepime/ flagyl. Received Tobra x 1 12/6    - dc linezolid   - Neutropenia: received Neupogen 480mcg x2  - MORAIMA: CRRT started 12/7, low u/o  - trend lactate  - DDVAP x1, cryo 1 unit, plt 1 unit   - echo 12/11    [] s/p OLT POD #25   - trend LFT's  - Diet: start tube feeds   - Pain management: avoid narcotics  - Strict I&Os, nichols, wound vac  - US: L brachial DVT   - wound vac     [ ] Immunosuppression  - Tacrolimus stopped 11/18 in setting of AMS/Seizure, Started Cyclo 11/24  - Cyclo held, MMF held, on stress dose steroids  - PPx: Gancyclovir, bactrim    [] lleus   -@ home on Linzess  - imaging with distended colon (likely opioid induced)  - s/p Neostigmine x 2  - s/p Relistor, last dose 12/1  - s/p colectomy  (see above)  - Daily AXR     [] CMV viremia  - currently on Gancyclovir   - repeat CMV PCR     [ ] AMS  - Reintubated 11/20 Aspiration/hypoxia, extubated 11/24->[id number]/24--> extubated 11/26 -> [id number]/7  - EEG 11/30 negative, Neurology following  -  following   - off tacro  - on keppra    [ ] HTN/ pAFib  - On Amiodarone  - metoprolol held (hypotensive)   - Eliquis 5mg bid - held 12/6.   - Dr. Quintanilla following    [ ] DM  - ISS

## 2024-12-11 NOTE — PROGRESS NOTE ADULT - ASSESSMENT
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 Post-op course c/b worsening mental status and acute hypoxic respiratory failure requiring multiple intubations/extubations, currently extubated (as of 11/26/24) and colonic ileus s/p several rounds of Relistor and Neostigmine with NGT and rectal tube currently in place now with septic shock of unclear etiology. Transplant nephrology consulted for metabolic acidosis and MORAIMA.    1. s/p OLT 11/15/24 with oliguric MORAIMA in setting of septic shock.  - Likely hemodynamically mediated in setting of cardiogenic and septic shock on multiple vasopressors and IABP.   - SCr on admission was 0.48 11/15/24, now uptrended to 1.90.  - UOP today is 70 cc, ~5-10 cc/hr,   - Urinalysis trace ketones, protein 30, 17 casts.   - CT AP non-con: Bilateral renal cysts including cysts with peripheral calcification in the left kidney.  - Given worsening acidosis, hypotension on vasopressors, oliguria, and signs of overload on exam, recommend CRRT at this time. Discussed with transplant hepatology team, would like to hold off for now. Please re-call for CRRT if needed.  - Pt. initiated on CRRT 12/7/24-.  - Continue with CRRT at this time, will change phoxillum to dialysate solution and use more prismasol. As patient also with starvation ketosis. BHB elevated at 2.3.  - IS per transplant hepatology team   - Monitor labs and BPs. Avoid nephrotoxins including, ACE/ARB, NSAIDs, contrast, etc.     2. Metabolic Acidosis   - AGMA in setting of septic shock, starvation ketosis, lactic acidosis and MORAIMA.  - pH 7.30, pCO2 23. SCO2 10. on 12/6/24.   - Lactate elevated at 8.2 today and BHB is 2.3.   - Will change the CRRT solutions above.   - Can start bicarbonate gtt 150 meq 100 cc/hr.       If you have any questions, please feel free to contact me:  Magaly Tello MD PGY-4  Nephrology Fellow  Pager [phone] / [employer] Teams (Preferred)  (After 5pm or on weekends please page the on-call fellow)

## 2024-12-11 NOTE — PHYSICAL THERAPY INITIAL EVALUATION ADULT - PERTINENT HX OF CURRENT PROBLEM, REHAB EVAL
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 Post op course complicated by: Reintubated and extubated multiple times from 11/20 through 12/7, now post op Colectomy 12/7, RTOR for closure and Ileostomy creation, IAPB 12/7- removed 12/10, CRRT started 12/7, low u/o.

## 2024-12-11 NOTE — PROGRESS NOTE ADULT - PROBLEM SELECTOR PLAN 5
- postop was noted to have Ileus and ischemic colon s/p total colectomy 12/7 with open abdomen  - abdominal transplant team following as stated above  - managed by CTU team

## 2024-12-11 NOTE — PROGRESS NOTE ADULT - ASSESSMENT
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 Pt underwent Y90 Sept, 2023 with favorable treatment response.s/p OLT 11/15/24     on 12/7/24 s/p Total abdominal colectomy with ileostomy  on 12/9 s/p ex-lap with ileostomy.    # Ileus/ischemic bowel  ileus for >1 week- Cdiff neg 12/1  S/p rectal decompression  # severe septic shock 12/6 and pancytopenia, lactic acidosis  # Ecoli bacteremia in context of above  received meropenem, linezolid/caspofungin + tobramycin x1 on 11/6  BC 12/6 positive  s/p colectomy on 12/6- noted dusky appearance- plan to go back to OR  # cardiogenic shock  on dobutamine- s/p impella  # LLL mucus plug  --Continue Cefepime and Metronidazole   --Deescalate Caspofungin to Fluconazole  --Stop Linezolid  --Continue to follow CBC with diff  --Continue to follow temperature curve  --Follow up on preliminary blood cultures    #Testicular Erythema/Edema  Lower suspicion for orchitis / cellulitis  Urology evaluation noted    #s/p OLT 11/15/24 CMV D?R? EBV , toxo D?/R?  --Reasonable to switch Bactrim to Atovaquone for now to account for possible contributors to thrombocytopenia  --Continue Ganciclovir 120 mg IV Q24    #Fever, sepsis, hypoxic respiratory failure s/p on mechanical ventilation  CT C/A/P Bilateral lower lobe consolidation with fluid and debris in the right lower lobe bronchi, concerning for aspiration pneumonia.  CMV PCR (11/24) 146 (low level CMV viremia - not likely contributing)  Adenovirus PCR (11/24) Negative  Cryptococcal Serum Ag (11/24) Negative  serum and bronch aspergillus galactomannan negative  WNV Serology and Serum PCR Negative  CT Chest (11/25) Groundglass opacities in the right lower and left upper lobes, possibly infectious in nature.  CT A/P (11/25) No acute process  s/p Meropenem 1 gram q 8hr (11/23 >11/29)    I will continue to follow. Please feel free to contact me with any further questions.    Kyle Junior M.D.  [facility] Division of Infectious Disease  8AM-5PM Monday - Friday: Available on [employer] Teams  After Hours and Holidays (or if no response on [employer] Teams): Please contact the Infectious Diseases Office at [phone]    The above assessment and plan were discussed with Kimberly, transplant surgery PA

## 2024-12-11 NOTE — PROGRESS NOTE ADULT - SUBJECTIVE AND OBJECTIVE BOX
Transplant Surgery - Multidisciplinary Progress Note  --------------------------------------------------------------  OLT   11/15/2024        POD#26  Colectomy 2024   POD#4   IABP     Present:   Patient seen and examined with multidisciplinary Transplant team including Surgeon: Dr. Palomino, Dr. Sorto, JAZZ Nguyễn, Hepatologist: Dr. Roe and ICU team in AM rounds.   Disciplines not in attendance will be notified of the plan.     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     s/p OLT 11/15 with post op course c/b:  ·	Hypoxia: Reintubated , extubated ->reintubated , extubated , reintubated   ·	Ileus   ·	Fever  ·	A fib  ·	AMS/Seizure   ·	L brachial DVT  ·	Neutropenia  ·	E coli Bacteremia ()  ·	s/p Coloctomy .   ·	IABP     Interval/Overnight Events:   - remains intubated, CRRT net negative  - IABP removed  - remains off levo, on vaso/  - LFTs improving, lactate hovering           Immunosuppression:  -Cyclo (held), MMF held this am, on stress dose steroids  -ongoing monitoring for signs of rejection    Potential Discharge date: Pending clinical course  Education: Medications  Plan of care: See Below                                              ROS: Unable to assess patient is intubated, sedated    PHYSICAL EXAM:   Constitutional: intubated, sedated   Eyes:  PERRLA  ENMT: nc/at, no thrush   Neck: supple   Respiratory: CTA B/L  Cardiovascular: RRR  Gastrointestinal: incision clean/dry/intact + Ostomy pink   Genitourinary: Castor in place   Extremities: SCD's in place and working bilaterally, + LE edema  Neurological: intubated, sedated  Skin: no rashes, ulcerations, lesions Transplant Surgery - Multidisciplinary Progress Note  --------------------------------------------------------------  OLT   11/15/2024        POD#26  Colectomy 2024   POD#4   IABP     Present:   Patient seen and examined with multidisciplinary Transplant team including Surgeon: Dr. Palomino, Dr. Sorto, JAZZ Nguyễn, Hepatologist: Dr. Roe and ICU team in AM rounds.   Disciplines not in attendance will be notified of the plan.     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     s/p OLT 11/15 with post op course c/b:  ·	Hypoxia: Reintubated , extubated ->reintubated , extubated , reintubated   ·	Ileus   ·	Fever  ·	A fib  ·	AMS/Seizure   ·	L brachial DVT  ·	Neutropenia  ·	E coli Bacteremia ()  ·	s/p Coloctomy .   ·	IABP     Interval/Overnight Events:   - remains intubated, CRRT net negative  - on vaso//norepi   - LFTs improving, lactate hovering     Immunosuppression:  -Cyclo (held), MMF held this am, on stress dose steroids  -ongoing monitoring for signs of rejection    Potential Discharge date: Pending clinical course  Education: Medications  Plan of care: See Below      MEDICATIONS  (STANDING):  albumin human 25% IVPB 100 milliLiter(s) IV Intermittent every 6 hours  aMIOdarone Infusion 1 mG/Min (33.3 mL/Hr) IV Continuous <Continuous>  Calcium Chloride 7 Gram(s),Sodium Chloride 0.9% 600 milliLiter(s) 7 Gram(s) (29.17 mL/Hr) IV Continuous <Continuous>  caspofungin IVPB 50 milliGRAM(s) IV Intermittent every 24 hours  cefepime   IVPB      cefepime   IVPB 1000 milliGRAM(s) IV Intermittent every 8 hours  chlorhexidine 0.12% Liquid 15 milliLiter(s) Oral Mucosa every 12 hours  chlorhexidine 2% Cloths 1 Application(s) Topical <User Schedule>  CRRT Treatment    <Continuous>  dexMEDEtomidine Infusion 1 MICROgram(s)/kG/Hr (23.4 mL/Hr) IV Continuous <Continuous>  dextrose 50% Injectable 50 milliLiter(s) IV Push every 15 minutes  DOBUTamine Infusion 3 MICROgram(s)/kG/Min (8.42 mL/Hr) IV Continuous <Continuous>  ganciclovir IVPB 120 milliGRAM(s) IV Intermittent every 24 hours  hydrocortisone sodium succinate Injectable 50 milliGRAM(s) IV Push every 12 hours  insulin regular Infusion 1 Unit(s)/Hr (1 mL/Hr) IV Continuous <Continuous>  metroNIDAZOLE  IVPB 500 milliGRAM(s) IV Intermittent every 12 hours  norepinephrine Infusion 0.01 MICROgram(s)/kG/Min (1.76 mL/Hr) IV Continuous <Continuous>  pantoprazole  Injectable 40 milliGRAM(s) IV Push every 12 hours  Phoxillum Filtration BK 4 / 2.5 5000 milliLiter(s) (1500 mL/Hr) CRRT <Continuous>  PrismaSOL Filtration BGK 4 / 2.5 5000 milliLiter(s) (1250 mL/Hr) CRRT <Continuous>  PrismaSOL Filtration BGK 4 / 2.5 5000 milliLiter(s) (250 mL/Hr) CRRT <Continuous>  sodium bicarbonate  Injectable 50 milliEquivalent(s) IV Push <User Schedule>  thiamine Injectable 100 milliGRAM(s) IV Push daily  trimethoprim / sulfamethoxazole IVPB 160 milliGRAM(s) IV Intermittent daily  vasopressin Infusion 0.01 Unit(s)/Min (1.5 mL/Hr) IV Continuous <Continuous>    MEDICATIONS  (PRN):  levalbuterol Inhalation 0.63 milliGRAM(s) Inhalation every 8 hours PRN Shortness of breath      PAST MEDICAL & SURGICAL HISTORY:  Diabetes      Transaminitis      Paroxysmal atrial fibrillation      Depression      BPH (benign prostatic hyperplasia)      Hypertension      Chronic atrial fibrillation      Coronary artery disease      Hepatocellular carcinoma      DM (diabetes mellitus)      HTN (hypertension)      Paroxysmal atrial fibrillation      Cirrhosis      HCC (hepatocellular carcinoma)      History of BPH      History of laparoscopic cholecystectomy      History of lumbar laminectomy      H/O prior ablation treatment      H/O percutaneous left heart catheterization          Vital Signs Last 24 Hrs  T(C): 36.9 (11 Dec 2024 16:00), Max: 37.5 (10 Dec 2024 20:00)  T(F): 98.4 (11 Dec 2024 16:00), Max: 99.5 (10 Dec 2024 20:00)  HR: 81 (11 Dec 2024 18:15) (75 - 129)  BP: --  BP(mean): --  RR: 25 (11 Dec 2024 18:15) (22 - 37)  SpO2: 94% (11 Dec 2024 18:15) (86% - 98%)    Parameters below as of 11 Dec 2024 16:00  Patient On (Oxygen Delivery Method): ventilator, CPAP 10/7        I&O's Summary    10 Dec 2024 07:01  -  11 Dec 2024 07:00  --------------------------------------------------------  IN: 3973.8 mL / OUT: 4666 mL / NET: -692.2 mL    11 Dec 2024 07:01  -  11 Dec 2024 18:40  --------------------------------------------------------  IN: 2701.2 mL / OUT: 3988 mL / NET: -1286.8 mL                              9.9    10.14 )-----------( 37       ( 11 Dec 2024 16:56 )             30.0     11    137  |  101  |  38[H]  ----------------------------<  194[H]  4.8   |  15[L]  |  1.02    Ca    8.7      11 Dec 2024 16:56  Phos  3.2       Mg     2.4         TPro  4.1[L]  /  Alb  3.4  /  TBili  10.5[H]  /  DBili  x   /  AST  26  /  ALT  98[H]  /  AlkPhos  75  11          Culture - Blood (collected 24 @ 15:30)  Source: .Blood BLOOD  Preliminary Report (12-10-24 @ 19:01):    No growth at 72 Hours    Culture - Body Fluid with Gram Stain (collected 24 @ 11:18)  Source: Body Fluid  Gram Stain (24 @ 18:22):    polymorphonuclear leukocytes seen    No organisms seen    by cytocentrifuge  Preliminary Report (24 @ 10:36):    No growth to date.    Culture - Blood (collected 24 @ 13:30)  Source: .Blood BLOOD  Gram Stain (24 @ 03:44):    Growth in aerobic bottle: Gram Negative Rods    Growth in anaerobic bottle: Gram Negative Rods  Final Report (24 @ 13:18):    Growth in aerobic and anaerobic bottles: Escherichia coli    See previous culture [id number]    Culture - Blood (collected 24 @ 13:25)  Source: .Blood BLOOD  Gram Stain (24 @ 03:07):    Growth in aerobic and anaerobic bottles: Gram Negative Rods  Final Report (24 @ 13:17):    Growth in aerobic and anaerobic bottles: Escherichia coli    Direct identification is available within approximately 3-5    hours either by Blood Panel Multiplexed PCR or Direct    MALDI-TOF. Details: [email or web address]  Organism: Blood Culture PCR  Escherichia coli (24 @ 13:17)  Organism: Escherichia coli (24 @ 13:17)  Organism: Blood Culture PCR (24 @ 13:17)    Culture - Blood (collected 24 @ 14:15)  Source: .Blood BLOOD  Final Report (12-10-24 @ 18:00):    No growth at 5 days    Culture - Blood (collected 24 @ 14:00)  Source: .Blood BLOOD  Final Report (12-10-24 @ 18:00):    No growth at 5 days        ROS: Unable to assess patient is intubated, sedated    PHYSICAL EXAM:   Constitutional: intubated, sedated   Eyes:  PERRLA  ENMT: nc/at, no thrush   Neck: supple   Respiratory: CTA B/L  Cardiovascular: RRR  Gastrointestinal: incision clean/dry/intact + Ostomy pink   Genitourinary: Castro in place   Extremities: SCD's in place and working bilaterally, + LE edema  Neurological: intubated, sedated  Skin: no rashes, ulcerations, lesions Transplant Surgery - Multidisciplinary Progress Note  --------------------------------------------------------------  OLT   11/15/2024        POD#26  Colectomy 2024   POD#4   IABP     Present:   Patient seen and examined with multidisciplinary Transplant team including Surgeon: Dr. Palomino, Dr. Sorto, JAZZ Nguyễn, Hepatologist: Dr. Roe and ICU team in AM rounds.   Disciplines not in attendance will be notified of the plan.     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     s/p OLT 11/15 with post op course c/b:  ·	Hypoxia: Reintubated , extubated ->reintubated , extubated , reintubated   ·	Ileus   ·	Fever  ·	A fib  ·	AMS/Seizure   ·	L brachial DVT  ·	Neutropenia  ·	E coli Bacteremia ()  ·	s/p Coloctomy .   ·	IABP     Interval/Overnight Events:   - remains intubated, CRRT net negative  - on vaso//norepi   - LFTs improving, lactate hovering     Immunosuppression:  -Cyclo (held), MMF held this am, on stress dose steroids  -ongoing monitoring for signs of rejection    Potential Discharge date: Pending clinical course  Education: Medications  Plan of care: See Below      MEDICATIONS  (STANDING):  albumin human 25% IVPB 100 milliLiter(s) IV Intermittent every 6 hours  Calcium Chloride 7 Gram(s),Sodium Chloride 0.9% 600 milliLiter(s) 7 Gram(s) (29.17 mL/Hr) IV Continuous <Continuous>  caspofungin IVPB 50 milliGRAM(s) IV Intermittent every 24 hours  cefepime   IVPB      cefepime   IVPB 1000 milliGRAM(s) IV Intermittent every 8 hours  chlorhexidine 0.12% Liquid 15 milliLiter(s) Oral Mucosa every 12 hours  chlorhexidine 2% Cloths 1 Application(s) Topical <User Schedule>  CRRT Treatment    <Continuous>  dexMEDEtomidine Infusion 1 MICROgram(s)/kG/Hr (23.4 mL/Hr) IV Continuous <Continuous>  dextrose 50% Injectable 50 milliLiter(s) IV Push every 15 minutes  DOBUTamine Infusion 3 MICROgram(s)/kG/Min (8.42 mL/Hr) IV Continuous <Continuous>  ganciclovir IVPB 120 milliGRAM(s) IV Intermittent every 24 hours  hydrocortisone sodium succinate Injectable 50 milliGRAM(s) IV Push every 12 hours  insulin regular Infusion 1 Unit(s)/Hr (1 mL/Hr) IV Continuous <Continuous>  metroNIDAZOLE  IVPB 500 milliGRAM(s) IV Intermittent every 12 hours  norepinephrine Infusion 0.01 MICROgram(s)/kG/Min (1.76 mL/Hr) IV Continuous <Continuous>  pantoprazole  Injectable 40 milliGRAM(s) IV Push every 12 hours  Phoxillum Filtration BK 4 / 2.5 5000 milliLiter(s) (1500 mL/Hr) CRRT <Continuous>  PrismaSOL Filtration BGK 4 / 2.5 5000 milliLiter(s) (1250 mL/Hr) CRRT <Continuous>  PrismaSOL Filtration BGK 4 / 2.5 5000 milliLiter(s) (250 mL/Hr) CRRT <Continuous>  sodium bicarbonate  Injectable 50 milliEquivalent(s) IV Push <User Schedule>  thiamine Injectable 100 milliGRAM(s) IV Push daily  trimethoprim / sulfamethoxazole IVPB 160 milliGRAM(s) IV Intermittent daily  vasopressin Infusion 0.01 Unit(s)/Min (1.5 mL/Hr) IV Continuous <Continuous>    MEDICATIONS  (PRN):  levalbuterol Inhalation 0.63 milliGRAM(s) Inhalation every 8 hours PRN Shortness of breath      PAST MEDICAL & SURGICAL HISTORY:  Diabetes      Transaminitis      Paroxysmal atrial fibrillation      Depression      BPH (benign prostatic hyperplasia)      Hypertension      Chronic atrial fibrillation      Coronary artery disease      Hepatocellular carcinoma      DM (diabetes mellitus)      HTN (hypertension)      Paroxysmal atrial fibrillation      Cirrhosis      HCC (hepatocellular carcinoma)      History of BPH      History of laparoscopic cholecystectomy      History of lumbar laminectomy      H/O prior ablation treatment      H/O percutaneous left heart catheterization          Vital Signs Last 24 Hrs  T(C): 37.1 (11 Dec 2024 19:00), Max: 37.5 (11 Dec 2024 00:00)  T(F): 98.8 (11 Dec 2024 19:00), Max: 99.5 (11 Dec 2024 00:00)  HR: 81 (11 Dec 2024 20:30) (75 - 129)  BP: --  BP(mean): --  RR: 25 (11 Dec 2024 20:30) (23 - 37)  SpO2: 95% (11 Dec 2024 20:30) (86% - 98%)    Parameters below as of 11 Dec 2024 19:00  Patient On (Oxygen Delivery Method): ventilator        I&O's Summary    10 Dec 2024 07:01  -  11 Dec 2024 07:00  --------------------------------------------------------  IN: 3973.8 mL / OUT: 4666 mL / NET: -692.2 mL    11 Dec 2024 07:01  -  11 Dec 2024 21:13  --------------------------------------------------------  IN: 2701.2 mL / OUT: 4466 mL / NET: -1764.8 mL                              9.8    9.99  )-----------( 27       ( 11 Dec 2024 20:32 )             30.1         137  |  102  |  40[H]  ----------------------------<  217[H]  4.8   |  14[L]  |  1.09    Ca    8.8      11 Dec 2024 20:33  Phos  3.4       Mg     2.4         TPro  4.0[L]  /  Alb  3.3  /  TBili  10.4[H]  /  DBili  x   /  AST  25  /  ALT  86[H]  /  AlkPhos  69  11          Culture - Blood (collected 24 @ 15:30)  Source: .Blood BLOOD  Preliminary Report (24 @ 19:01):    No growth at 4 days    Culture - Body Fluid with Gram Stain (collected 24 @ 11:18)  Source: Body Fluid  Gram Stain (24 @ 18:22):    polymorphonuclear leukocytes seen    No organisms seen    by cytocentrifuge  Preliminary Report (24 @ 10:36):    No growth to date.    Culture - Blood (collected 24 @ 13:30)  Source: .Blood BLOOD  Gram Stain (24 @ 03:44):    Growth in aerobic bottle: Gram Negative Rods    Growth in anaerobic bottle: Gram Negative Rods  Final Report (24 @ 13:18):    Growth in aerobic and anaerobic bottles: Escherichia coli    See previous culture [id number]    Culture - Blood (collected 24 @ 13:25)  Source: .Blood BLOOD  Gram Stain (24 @ 03:07):    Growth in aerobic and anaerobic bottles: Gram Negative Rods  Final Report (24 @ 13:17):    Growth in aerobic and anaerobic bottles: Escherichia coli    Direct identification is available within approximately 3-5    hours either by Blood Panel Multiplexed PCR or Direct    MALDI-TOF. Details: [email or web address]  Organism: Blood Culture PCR  Escherichia coli (24 @ 13:17)  Organism: Escherichia coli (24 @ 13:17)  Organism: Blood Culture PCR (24 @ 13:17)    Culture - Blood (collected 24 @ 14:15)  Source: .Blood BLOOD  Final Report (12-10-24 @ 18:00):    No growth at 5 days    Culture - Blood (collected 24 @ 14:00)  Source: .Blood BLOOD  Final Report (12-10-24 @ 18:00):    No growth at 5 days      ROS: Unable to assess patient is intubated, sedated    PHYSICAL EXAM:   Constitutional: intubated, sedated   Eyes:  PERRLA  ENMT: nc/at, no thrush   Neck: supple   Respiratory: CTA B/L  Cardiovascular: RRR  Gastrointestinal: incision clean/dry/intact + Ostomy pink   Genitourinary: Castro in place   Extremities: SCD's in place and working bilaterally, + LE edema  Neurological: intubated, sedated  Skin: no rashes, ulcerations, lesions

## 2024-12-11 NOTE — PROGRESS NOTE ADULT - SUBJECTIVE AND OBJECTIVE BOX
HPI:  Patient seen and examined at bedside in CTU.  IABP removed 12/10.  Remains intubated and on dobutamine with much lower pressor requirements.  TTE performed yesterday with LVEF severely reduced.     Review Of Systems:    Unable to assess while intubated/sedated       Medications:  albumin human 25% IVPB 100 milliLiter(s) IV Intermittent every 6 hours  aMIOdarone Infusion 1 mG/Min IV Continuous <Continuous>  Calcium Chloride 7 Gram(s),Sodium Chloride 0.9% 600 milliLiter(s) 7 Gram(s) IV Continuous <Continuous>  caspofungin IVPB 50 milliGRAM(s) IV Intermittent every 24 hours  cefepime   IVPB      cefepime   IVPB 1000 milliGRAM(s) IV Intermittent every 8 hours  chlorhexidine 0.12% Liquid 15 milliLiter(s) Oral Mucosa every 12 hours  chlorhexidine 2% Cloths 1 Application(s) Topical <User Schedule>  CRRT Treatment    <Continuous>  desmopressin IVPB 35 MICROGram(s) IV Intermittent once  dexMEDEtomidine Infusion 1 MICROgram(s)/kG/Hr IV Continuous <Continuous>  dextrose 50% Injectable 50 milliLiter(s) IV Push every 15 minutes  DOBUTamine Infusion 3 MICROgram(s)/kG/Min IV Continuous <Continuous>  ganciclovir IVPB 120 milliGRAM(s) IV Intermittent every 24 hours  hydrocortisone sodium succinate Injectable 50 milliGRAM(s) IV Push every 12 hours  insulin regular Infusion 1 Unit(s)/Hr IV Continuous <Continuous>  levalbuterol Inhalation 0.63 milliGRAM(s) Inhalation every 8 hours PRN  metroNIDAZOLE  IVPB 500 milliGRAM(s) IV Intermittent every 12 hours  norepinephrine Infusion 0.01 MICROgram(s)/kG/Min IV Continuous <Continuous>  pantoprazole  Injectable 40 milliGRAM(s) IV Push every 12 hours  Phoxillum Filtration BK 4 / 2.5 5000 milliLiter(s) CRRT <Continuous>  PrismaSOL Filtration BGK 4 / 2.5 5000 milliLiter(s) CRRT <Continuous>  PrismaSOL Filtration BGK 4 / 2.5 5000 milliLiter(s) CRRT <Continuous>  sodium bicarbonate  Injectable 50 milliEquivalent(s) IV Push <User Schedule>  thiamine Injectable 100 milliGRAM(s) IV Push daily  trimethoprim / sulfamethoxazole IVPB 160 milliGRAM(s) IV Intermittent daily  vasopressin Infusion 0.01 Unit(s)/Min IV Continuous <Continuous>    PAST MEDICAL & SURGICAL HISTORY:  Diabetes      Transaminitis      Paroxysmal atrial fibrillation      Depression      BPH (benign prostatic hyperplasia)      Hypertension      Chronic atrial fibrillation      Coronary artery disease      Hepatocellular carcinoma      DM (diabetes mellitus)      HTN (hypertension)      Paroxysmal atrial fibrillation      Cirrhosis      HCC (hepatocellular carcinoma)      History of BPH      History of laparoscopic cholecystectomy      History of lumbar laminectomy      H/O prior ablation treatment      H/O percutaneous left heart catheterization        Vitals:  T(C): 37 (24 @ 12:00), Max: 37.5 (12-10-24 @ 16:00)  HR: 90 (24 @ 13:00) (71 - 129)  BP: --  BP(mean): --  RR: 25 (24 @ 13:00) (21 - 32)  SpO2: 96% (24 @ 13:00) (86% - 100%)  Wt(kg): --  Daily     Daily Weight in k.6 (11 Dec 2024 00:00)  I&O's Summary    10 Dec 2024 07:01  -  11 Dec 2024 07:00  --------------------------------------------------------  IN: 3973.8 mL / OUT: 4666 mL / NET: -692.2 mL    11 Dec 2024 07:01  -  11 Dec 2024 13:28  --------------------------------------------------------  IN: 1539.7 mL / OUT: 2063 mL / NET: -523.3 mL        Physical Exam:  Appearance: Intubated, critically ill   Eyes: PERRL, EOMI, pink conjunctiva  HENT: +ET tube, +NG tube  Cardiovascular: RRR, S1, S2, no murmurs, rubs, or gallops; no edema; no JVD  Respiratory: ventilator support  Gastrointestinal: soft, non-tender, non-distended with normal bowel sounds  Musculoskeletal: No clubbing; no joint deformity                           9.8    8.12  )-----------( 13       ( 11 Dec 2024 12:16 )             29.7     12-11    138  |  102  |  36[H]  ----------------------------<  172[H]  4.8   |  16[L]  |  1.02    Ca    8.5      11 Dec 2024 12:16  Phos  3.3     12-11  Mg     2.5     12-11    TPro  4.0[L]  /  Alb  3.2[L]  /  TBili  9.7[H]  /  DBili  x   /  AST  27  /  ALT  103[H]  /  AlkPhos  65  12-11    PT/INR - ( 11 Dec 2024 12:16 )   PT: 24.3 sec;   INR: 2.13 ratio         PTT - ( 11 Dec 2024 12:16 )  PTT:62.4 sec        Cardiovascular Diagnostic Testing:  ECG: sinus rhythm    Echo: < from: Intra-Operative Transesophageal Echo W or WO Ultrasound Enhancing Agent (15.24 @ 07:46) >  --------------------------------------------------------------------------------  PRE-BYPASS FINDINGS     Left Ventricle:  Left ventricular ejection fraction is estimated at 60 to 65%. Normal left ventricularwall thickness. The left ventricular systolic function is normal There are no regional wall motion abnormalities seen.     Right Ventricle:  The right ventricular cavity is normal in size, with normal wall thickness and right ventricular systolic function is normal. Right ventricular systolic function is normal.     Left Atrium:  The left atrium is normal in size. No thrombus visualized in left atrial appendage.     Right Atrium:  The right atrium is normal in size. The right atrium is normal in size.     Interatrial Septum:  No PFO visualized with color flow doppler.     Aortic Valve:  The aortic valve appears trileaflet. There is no aortic valve stenosis. There is trace aortic regurgitation.     Mitral Valve:  There is no mitral valve stenosis. There is no mitral valve stenosis. There is trace mitral regurgitation.     Tricuspid Valve:  There is no evidence of tricuspid stenosis. There is trace tricuspid regurgitation.     Pericardium:  No pericardial effusion seen.     Post-Bypass:  S/P OLT. Under current loading conditions, hyperdynamic left ventricular systolic function, EF: 70-75% by visual estimate, no RWMA. Normal right ventricular systolic function. All other findings unchanged. Probe removed atraumatically, no blood. The patient tolerated the procedure well and without complications. Permanent recorded images are stored in the medical record.     Electronically signed by James Villareal on 11/15/2024 at 2:18:16 PM         *** Final ***    < end of copied text >      Stress Testing:     Cath: 2024, patient had his LHC repeated with Ben Villareal MD at [facility].  Cath showed multivessel coronary artery disease, but the lesions previously noted were FFR negative.  He continues to have MIRTA 3 flow throughout.    Interpretation of Telemetry: Sinus     Imaging:

## 2024-12-11 NOTE — PROGRESS NOTE ADULT - SUBJECTIVE AND OBJECTIVE BOX
Patient seen and examined at the bedside.    Remains critically ill on continuous ICU monitoring, at risk for life threatening decompensation.  All Labs, data reviewed. Plan of care discussed in length during multi-disciplinary ICU rounds.       Brief Summary:  75 yo M s/p recent liver transplant now with Cardiogenic and Septic shock s/p IABP insertion, total abdominal colectomy on 12/7/2024  Abdomen remains open and bowel is in discontinuity.    24 Hour events:  Lactate plateaued in 8s.  Dobutamine weaned to 3 yesterday, increased back to 5 overnight.  Platelet count remains low.  OR yesterday for abdominal re-exploration.    Objective:  Vital Signs Last 24 Hrs  T(C): 37.3 (11 Dec 2024 04:00), Max: 37.5 (10 Dec 2024 16:00)  T(F): 99.1 (11 Dec 2024 04:00), Max: 99.5 (10 Dec 2024 16:00)  HR: 88 (11 Dec 2024 06:30) (67 - 101)  BP: --  BP(mean): --  RR: 24 (11 Dec 2024 06:30) (18 - 31)  SpO2: 96% (11 Dec 2024 06:30) (86% - 100%)    Parameters below as of 11 Dec 2024 04:00  Patient On (Oxygen Delivery Method): ventilator    O2 Concentration (%): 40    Mode: AC/ CMV (Assist Control/ Continuous Mandatory Ventilation)  RR (machine): 18  TV (machine): 500  FiO2: 40  PEEP: 8  ITime: 1  MAP: 12  PIP: 19              Physical Exam:   General: Intubated  Neurology: Sedated but follows commands when sedation is off.  Respiratory: Bilateral breath sounds  CV: Sinus  Abdominal: Soft, Nontender  Extremities: Warm, well-perfused  Castro  -------------------------------------------------------------------------------------------------------------------------------    Labs:                        11.3   9.56  )-----------( 27       ( 11 Dec 2024 00:32 )             34.1     12-11    137  |  101  |  34[H]  ----------------------------<  149[H]  4.6   |  14[L]  |  1.05    Ca    8.4      11 Dec 2024 00:34  Phos  3.1     12-11  Mg     2.4     12-11    TPro  4.0[L]  /  Alb  3.0[L]  /  TBili  9.3[H]  /  DBili  x   /  AST  43[H]  /  ALT  157[H]  /  AlkPhos  74  12-11    LIVER FUNCTIONS - ( 11 Dec 2024 00:34 )  Alb: 3.0 g/dL / Pro: 4.0 g/dL / ALK PHOS: 74 U/L / ALT: 157 U/L / AST: 43 U/L / GGT: x           PT/INR - ( 11 Dec 2024 00:33 )   PT: 21.3 sec;   INR: 1.88 ratio         PTT - ( 11 Dec 2024 00:33 )  PTT:52.1 sec  ABG - ( 11 Dec 2024 04:37 )  pH, Arterial: 7.35  pH, Blood: x     /  pCO2: 26    /  pO2: 142   / HCO3: 14    / Base Excess: -9.7  /  SaO2: 98.8        ALL MEDICATIONS   MEDICATIONS  (STANDING):  albumin human 25% IVPB 100 milliLiter(s) IV Intermittent every 8 hours  aMIOdarone Infusion 0.501 mG/Min (16.7 mL/Hr) IV Continuous <Continuous>  Calcium Chloride 5 Gram(s),Sodium Chloride 0.9% 400 milliLiter(s) 5 Gram(s) (16.67 mL/Hr) IV Continuous <Continuous>  caspofungin IVPB 50 milliGRAM(s) IV Intermittent every 24 hours  cefepime   IVPB 1000 milliGRAM(s) IV Intermittent every 8 hours  cefepime   IVPB      chlorhexidine 0.12% Liquid 15 milliLiter(s) Oral Mucosa every 12 hours  chlorhexidine 2% Cloths 1 Application(s) Topical <User Schedule>  CRRT Treatment    <Continuous>  dexMEDEtomidine Infusion 1 MICROgram(s)/kG/Hr (23.4 mL/Hr) IV Continuous <Continuous>  dextrose 50% Injectable 50 milliLiter(s) IV Push every 15 minutes  DOBUTamine Infusion 5 MICROgram(s)/kG/Min (14 mL/Hr) IV Continuous <Continuous>  ganciclovir IVPB 120 milliGRAM(s) IV Intermittent every 24 hours  hydrocortisone sodium succinate Injectable 50 milliGRAM(s) IV Push every 12 hours  insulin regular Infusion 1 Unit(s)/Hr (1 mL/Hr) IV Continuous <Continuous>  linezolid  IVPB 600 milliGRAM(s) IV Intermittent every 12 hours  metroNIDAZOLE  IVPB 500 milliGRAM(s) IV Intermittent every 12 hours  norepinephrine Infusion 0.01 MICROgram(s)/kG/Min (1.76 mL/Hr) IV Continuous <Continuous>  pantoprazole  Injectable 40 milliGRAM(s) IV Push every 12 hours  Phoxillum Filtration BK 4 / 2.5 5000 milliLiter(s) (1200 mL/Hr) CRRT <Continuous>  Phoxillum Filtration BK 4 / 2.5 5000 milliLiter(s) (250 mL/Hr) CRRT <Continuous>  PrismaSATE Dialysate BGK 4 / 2.5 5000 milliLiter(s) (1300 mL/Hr) CRRT <Continuous>  thiamine Injectable 100 milliGRAM(s) IV Push daily  trimethoprim / sulfamethoxazole IVPB 160 milliGRAM(s) IV Intermittent daily  vasopressin Infusion 0.01 Unit(s)/Min (1.5 mL/Hr) IV Continuous <Continuous>    MEDICATIONS  (PRN):    ------------------------------------------------------------------------------------------------------------------------------  Assessment:  75 yo M with cardiogenic shock s/p IABP insertion, total abdominal colectomy with ileostomy on 12/7/2024.     Cardiogenic shock  Hypovolemia  Lactic acidosis  Acute respiratory failure  Acute liver injury, transaminitis, hyperbilirubinemia  Acute kidney injury  Hypokalemia  Hypocalcemia  Acute blood loss anemia  Thrombocytopenia  E coli bacteremia    Plan:  ***Neuro***  Sedated with Precedex for comfort/vent synchrony.     ***Cardiovascular***  At high risk for ongoing hemodynamic instability and cardiac arrhythmias.  Remains on Dobutamine and Levophed - trend cardiac index and mixed venous saturation.  Trend Lactate  R. Fem IABP augmenting 1:1  AYDEN in OR yesterday.  A fib - aggressive repletion of electrolytes, on PO Amiodarone.  Jacqui 5 ppm - Wean to off  Wean Vasopressin for MAP > 65 mm Hg.  Albumin / IVF/ FFP for hypovolemia - monitor VAC outputs.    ***Pulmonary***  Postoperative acute respiratory failure  Remains on vent support.  May benefit from early tracheostomy    ***GI***  s/p liver transplant with acute liver injury from sepsis - trend LFTs.  s/p total abdominal colectomy  NPO for now.  Remains in discontinuity.  VAC with high output but thin drainage - ongoing resuscitation to keep I/O close to even.  Return to OR yesterday for re-exploration, possible stoma, possible closure.  Protonix for stress ulcer prophylaxis.     ***Renal***  MORAIMA - on CRRT.  Castro catheter for strict I/O measurements.  Volume removal after OR, will supplement with concentrated Albumin.   Hypokalemia repleted.  Hypocalcemia - Calcium infusion, trend levels, may be able to stop after OR.    ***ID***  Merrem for e coli bacteremia - will discuss possible narrowing with ID.  Sepsis coverage with Caspofungin and Linezolid.  CMV prophylaxis with Ganciclovir.  Trend leukopenia s/p Filgrastin.  Bactrim for prophylaxis.  Cefepime for bacteremia      ***Endocrine***  Monitor blood sugars, Insulin if needed.    ***Hematology***  Acute blood loss anemia and thrombocytopenia.  Trend CBC and monitor for bleeding.  Transfused platelets yesterday morning prior to OR.      Jigna REY MD, personally performed the services described in this documentation. all medical record entries made by the scribe were at my direction and in my presence. I have reviewed the chart and agree that the record reflects my personal performance and is accurate and complete  Electronically signed:   Jigna Hensley MD  CT ICU attending     ICU time: ** mins     By signing my name below, Remington REY, attest that this documentation has been prepared under the direction and in the presence of Jigna Hensley MD  Electronically signed: Remington Barriga, 12-11-24 @ 07:05             Patient seen and examined at the bedside.    Remains critically ill on continuous ICU monitoring, at risk for life threatening decompensation.  All Labs, data reviewed. Plan of care discussed in length during multi-disciplinary ICU rounds.       Brief Summary:  73 yo M s/p recent liver transplant now with Cardiogenic and Septic shock s/p IABP insertion, total abdominal colectomy on 12/7/2024  Abdomen remains open and bowel is in discontinuity.    24 Hour events:  Lactate plateaued in 8s.  Start Albumin, Cryo-5 units  Start Trickle feeds  AYDEN today     Objective:  Vital Signs Last 24 Hrs  T(C): 37.3 (11 Dec 2024 04:00), Max: 37.5 (10 Dec 2024 16:00)  T(F): 99.1 (11 Dec 2024 04:00), Max: 99.5 (10 Dec 2024 16:00)  HR: 88 (11 Dec 2024 06:30) (67 - 101)  BP: --  BP(mean): --  RR: 24 (11 Dec 2024 06:30) (18 - 31)  SpO2: 96% (11 Dec 2024 06:30) (86% - 100%)    Parameters below as of 11 Dec 2024 04:00  Patient On (Oxygen Delivery Method): ventilator    O2 Concentration (%): 40    Mode: AC/ CMV (Assist Control/ Continuous Mandatory Ventilation)  RR (machine): 18  TV (machine): 500  FiO2: 40  PEEP: 8  ITime: 1  MAP: 12  PIP: 19              Physical Exam:   General: Intubated  Neurology: Sedated but follows commands when sedation is off.  Respiratory: Bilateral breath sounds  CV: Sinus  Abdominal: Soft, Nontender  Extremities: Warm, well-perfused  Csatro  -------------------------------------------------------------------------------------------------------------------------------    Labs:                        11.3   9.56  )-----------( 27       ( 11 Dec 2024 00:32 )             34.1     12-11    137  |  101  |  34[H]  ----------------------------<  149[H]  4.6   |  14[L]  |  1.05    Ca    8.4      11 Dec 2024 00:34  Phos  3.1     12-11  Mg     2.4     12-11    TPro  4.0[L]  /  Alb  3.0[L]  /  TBili  9.3[H]  /  DBili  x   /  AST  43[H]  /  ALT  157[H]  /  AlkPhos  74  12-11    LIVER FUNCTIONS - ( 11 Dec 2024 00:34 )  Alb: 3.0 g/dL / Pro: 4.0 g/dL / ALK PHOS: 74 U/L / ALT: 157 U/L / AST: 43 U/L / GGT: x           PT/INR - ( 11 Dec 2024 00:33 )   PT: 21.3 sec;   INR: 1.88 ratio         PTT - ( 11 Dec 2024 00:33 )  PTT:52.1 sec  ABG - ( 11 Dec 2024 04:37 )  pH, Arterial: 7.35  pH, Blood: x     /  pCO2: 26    /  pO2: 142   / HCO3: 14    / Base Excess: -9.7  /  SaO2: 98.8        ALL MEDICATIONS   MEDICATIONS  (STANDING):  albumin human 25% IVPB 100 milliLiter(s) IV Intermittent every 8 hours  aMIOdarone Infusion 0.501 mG/Min (16.7 mL/Hr) IV Continuous <Continuous>  Calcium Chloride 5 Gram(s),Sodium Chloride 0.9% 400 milliLiter(s) 5 Gram(s) (16.67 mL/Hr) IV Continuous <Continuous>  caspofungin IVPB 50 milliGRAM(s) IV Intermittent every 24 hours  cefepime   IVPB 1000 milliGRAM(s) IV Intermittent every 8 hours  cefepime   IVPB      chlorhexidine 0.12% Liquid 15 milliLiter(s) Oral Mucosa every 12 hours  chlorhexidine 2% Cloths 1 Application(s) Topical <User Schedule>  CRRT Treatment    <Continuous>  dexMEDEtomidine Infusion 1 MICROgram(s)/kG/Hr (23.4 mL/Hr) IV Continuous <Continuous>  dextrose 50% Injectable 50 milliLiter(s) IV Push every 15 minutes  DOBUTamine Infusion 5 MICROgram(s)/kG/Min (14 mL/Hr) IV Continuous <Continuous>  ganciclovir IVPB 120 milliGRAM(s) IV Intermittent every 24 hours  hydrocortisone sodium succinate Injectable 50 milliGRAM(s) IV Push every 12 hours  insulin regular Infusion 1 Unit(s)/Hr (1 mL/Hr) IV Continuous <Continuous>  linezolid  IVPB 600 milliGRAM(s) IV Intermittent every 12 hours  metroNIDAZOLE  IVPB 500 milliGRAM(s) IV Intermittent every 12 hours  norepinephrine Infusion 0.01 MICROgram(s)/kG/Min (1.76 mL/Hr) IV Continuous <Continuous>  pantoprazole  Injectable 40 milliGRAM(s) IV Push every 12 hours  Phoxillum Filtration BK 4 / 2.5 5000 milliLiter(s) (1200 mL/Hr) CRRT <Continuous>  Phoxillum Filtration BK 4 / 2.5 5000 milliLiter(s) (250 mL/Hr) CRRT <Continuous>  PrismaSATE Dialysate BGK 4 / 2.5 5000 milliLiter(s) (1300 mL/Hr) CRRT <Continuous>  thiamine Injectable 100 milliGRAM(s) IV Push daily  trimethoprim / sulfamethoxazole IVPB 160 milliGRAM(s) IV Intermittent daily  vasopressin Infusion 0.01 Unit(s)/Min (1.5 mL/Hr) IV Continuous <Continuous>    MEDICATIONS  (PRN):    ------------------------------------------------------------------------------------------------------------------------------  Assessment:  73 yo M with cardiogenic shock s/p IABP insertion, total abdominal colectomy with ileostomy on 12/7/2024.     Cardiogenic shock  Hypovolemia  Lactic acidosis  Acute respiratory failure  Acute liver injury, transaminitis, hyperbilirubinemia  Acute kidney injury  Hypokalemia  Hypocalcemia  Acute blood loss anemia  Thrombocytopenia  E coli bacteremia    Plan:  ***Neuro***  Sedated with Precedex for comfort/vent synchrony.     ***Cardiovascular***  At high risk for ongoing hemodynamic instability and cardiac arrhythmias.  Remains on Dobutamine and Levophed - trend cardiac index and mixed venous saturation.  Trend Lactate  R. Fem IABP augmenting 1:1  AYDEN in OR yesterday.  A fib - aggressive repletion of electrolytes, on PO Amiodarone.  Jacqui 5 ppm - Wean to off  Wean Vasopressin for MAP > 65 mm Hg.  Albumin / IVF/ FFP for hypovolemia - monitor VAC outputs.    ***Pulmonary***  Postoperative acute respiratory failure  Remains on vent support.  May benefit from early tracheostomy    ***GI***  s/p liver transplant with acute liver injury from sepsis - trend LFTs.  s/p total abdominal colectomy  NPO for now.  Remains in discontinuity.  VAC with high output but thin drainage - ongoing resuscitation to keep I/O close to even.  Return to OR yesterday for re-exploration, possible stoma, possible closure.  Protonix for stress ulcer prophylaxis.     ***Renal***  MORAIMA - on CRRT.  Castro catheter for strict I/O measurements.  Volume removal after OR, will supplement with concentrated Albumin.   Hypokalemia repleted.  Hypocalcemia - Calcium infusion, trend levels, may be able to stop after OR.    ***ID***  Merrem for e coli bacteremia - will discuss possible narrowing with ID.  Sepsis coverage with Caspofungin and Linezolid.  CMV prophylaxis with Ganciclovir.  Trend leukopenia s/p Filgrastin.  Bactrim for prophylaxis.  Cefepime for bacteremia      ***Endocrine***  Monitor blood sugars, Insulin if needed.    ***Hematology***  Acute blood loss anemia and thrombocytopenia.  Trend CBC and monitor for bleeding.  Transfused platelets yesterday morning prior to OR.      I, Jigna Hensley MD, personally performed the services described in this documentation. all medical record entries made by the scribe were at my direction and in my presence. I have reviewed the chart and agree that the record reflects my personal performance and is accurate and complete  Electronically signed:   Jigna Hensley MD  CT ICU attending     ICU time: ** mins     By signing my name below, I, Remington Barriga, attest that this documentation has been prepared under the direction and in the presence of Jigna Hensley MD  Electronically signed: Remington Barriga, 12-11-24 @ 07:05             Patient seen and examined at the bedside.    Remains critically ill on continuous ICU monitoring, at risk for life threatening decompensation.  All Labs, data reviewed. Plan of care discussed in length during multi-disciplinary ICU rounds.     Brief Summary:  73 yo M s/p recent liver transplant now with Cardiogenic and Septic shock s/p IABP insertion, total abdominal colectomy on 12/7/2024  with open abdomen and bowel remains  in discontinuity, admitted to [facility] for IABP managment    24 Hour events:  Abdomen closed, S/p ileostomy creation  IABP d/c , TTE improved Biventricular function  Weaning Dobutamine, now at 3, stable SvO2  Off pressors, stable HDs  Increase Ca gtt dose, Bicarb IV   Lactic acidosis slowly improving  Start Trickle feeds, ostomy is functioning      Objective:  Vital Signs Last 24 Hrs  T(C): 37.3 (11 Dec 2024 04:00), Max: 37.5 (10 Dec 2024 16:00)  T(F): 99.1 (11 Dec 2024 04:00), Max: 99.5 (10 Dec 2024 16:00)  HR: 88 (11 Dec 2024 06:30) (67 - 101)  BP: --  BP(mean): --  RR: 24 (11 Dec 2024 06:30) (18 - 31)  SpO2: 96% (11 Dec 2024 06:30) (86% - 100%)    Parameters below as of 11 Dec 2024 04:00  Patient On (Oxygen Delivery Method): ventilator    O2 Concentration (%): 40    Mode: AC/ CMV (Assist Control/ Continuous Mandatory Ventilation)  RR (machine): 18  TV (machine): 500  FiO2: 40  PEEP: 8  ITime: 1  MAP: 12  PIP: 19              Physical Exam:   General: Intubated  Neurology: off sedation, follows commands, OT in bed  Respiratory: Bilateral breath sounds  CV: Sinus now, paroxysmal A fib  Abdominal: Soft, Nontender  Extremities: Warm, well-perfused  Catsro  -------------------------------------------------------------------------------------------------------------------------------    Labs:                        11.3   9.56  )-----------( 27       ( 11 Dec 2024 00:32 )             34.1     12-11    137  |  101  |  34[H]  ----------------------------<  149[H]  4.6   |  14[L]  |  1.05    Ca    8.4      11 Dec 2024 00:34  Phos  3.1     12-11  Mg     2.4     12-11    TPro  4.0[L]  /  Alb  3.0[L]  /  TBili  9.3[H]  /  DBili  x   /  AST  43[H]  /  ALT  157[H]  /  AlkPhos  74  12-11    LIVER FUNCTIONS - ( 11 Dec 2024 00:34 )  Alb: 3.0 g/dL / Pro: 4.0 g/dL / ALK PHOS: 74 U/L / ALT: 157 U/L / AST: 43 U/L / GGT: x           PT/INR - ( 11 Dec 2024 00:33 )   PT: 21.3 sec;   INR: 1.88 ratio         PTT - ( 11 Dec 2024 00:33 )  PTT:52.1 sec  ABG - ( 11 Dec 2024 04:37 )  pH, Arterial: 7.35  pH, Blood: x     /  pCO2: 26    /  pO2: 142   / HCO3: 14    / Base Excess: -9.7  /  SaO2: 98.8        ALL MEDICATIONS   MEDICATIONS  (STANDING):  albumin human 25% IVPB 100 milliLiter(s) IV Intermittent every 8 hours  aMIOdarone Infusion 0.501 mG/Min (16.7 mL/Hr) IV Continuous <Continuous>  Calcium Chloride 5 Gram(s),Sodium Chloride 0.9% 400 milliLiter(s) 5 Gram(s) (16.67 mL/Hr) IV Continuous <Continuous>  caspofungin IVPB 50 milliGRAM(s) IV Intermittent every 24 hours  cefepime   IVPB 1000 milliGRAM(s) IV Intermittent every 8 hours  cefepime   IVPB      chlorhexidine 0.12% Liquid 15 milliLiter(s) Oral Mucosa every 12 hours  chlorhexidine 2% Cloths 1 Application(s) Topical <User Schedule>  CRRT Treatment    <Continuous>  dexMEDEtomidine Infusion 1 MICROgram(s)/kG/Hr (23.4 mL/Hr) IV Continuous <Continuous>  dextrose 50% Injectable 50 milliLiter(s) IV Push every 15 minutes  DOBUTamine Infusion 5 MICROgram(s)/kG/Min (14 mL/Hr) IV Continuous <Continuous>  ganciclovir IVPB 120 milliGRAM(s) IV Intermittent every 24 hours  hydrocortisone sodium succinate Injectable 50 milliGRAM(s) IV Push every 12 hours  insulin regular Infusion 1 Unit(s)/Hr (1 mL/Hr) IV Continuous <Continuous>  linezolid  IVPB 600 milliGRAM(s) IV Intermittent every 12 hours  metroNIDAZOLE  IVPB 500 milliGRAM(s) IV Intermittent every 12 hours  norepinephrine Infusion 0.01 MICROgram(s)/kG/Min (1.76 mL/Hr) IV Continuous <Continuous>  pantoprazole  Injectable 40 milliGRAM(s) IV Push every 12 hours  Phoxillum Filtration BK 4 / 2.5 5000 milliLiter(s) (1200 mL/Hr) CRRT <Continuous>  Phoxillum Filtration BK 4 / 2.5 5000 milliLiter(s) (250 mL/Hr) CRRT <Continuous>  PrismaSATE Dialysate BGK 4 / 2.5 5000 milliLiter(s) (1300 mL/Hr) CRRT <Continuous>  thiamine Injectable 100 milliGRAM(s) IV Push daily  trimethoprim / sulfamethoxazole IVPB 160 milliGRAM(s) IV Intermittent daily  vasopressin Infusion 0.01 Unit(s)/Min (1.5 mL/Hr) IV Continuous <Continuous>    MEDICATIONS  (PRN):    ------------------------------------------------------------------------------------------------------------------------------  Assessment:  73 yo M with cardiogenic shock s/p IABP insertion, total abdominal colectomy with ileostomy on 12/7/2024.     Cardiogenic shock  Hypovolemia  Lactic acidosis  Acute respiratory failure  Acute liver injury, transaminitis, hyperbilirubinemia  Acute kidney injury  Hypokalemia  Hypocalcemia  Acute blood loss anemia  Thrombocytopenia  E coli bacteremia    Plan:  ***Neuro***  Off precedex now, pain well controlled  Comfortable on PS on a vent    ***Cardiovascular***  At high risk for ongoing hemodynamic instability and cardiac arrhythmias.   Dobutamine weaning, now at 3, Stable SVO2, good CI  TTE shows improvement of Bivent function  IABP d/c  A fib paroxysmal on amio gtt   Jacqui 5 ppm   Albumin, platelets Cryo   Lactic acidosis improving    ***Pulmonary***  Postoperative acute respiratory failure  Remains on vent support, tolerated PS low settings all day  keep on a vent at night  Trach care, nebs prn    ***GI***  Severe protein calorie malnutrition   s/p liver transplant with acute liver injury from sepsis - trend LFTs.  s/p total abdominal colectomy, abdominal wall closure  S/p ileostomy creation  Started on Trickle feeds  Monitor LFTS, hyperbilirubinemia  Protonix for ppx    ***Renal***  MORAIMA - on CRRT.  Castro catheter for strict I/O measurements.  Albumin 25%  Hypocalcemia - Calcium infusion  Scrotum edema, skin disruption,  US negative for testicular ischemia, abscess  Urology following    ***ID***  Merrem for e coli bacteremia , caspofungin   Linezolid course completed  CMV prophylaxis with Ganciclovir.  s/p Filgrastin.  Bactrim for prophylaxis.    ***Endocrine***  Monitor blood sugars, Insulin if needed.    ***Hematology***  Acute blood loss anemia and thrombocytopenia.  s/p Platelets, Cryo,DDIVP dose  elevated INR secondary to acute liver dysfunction and protein calorie malnutrition      I, Jigna Hensley MD, personally performed the services described in this documentation. all medical record entries made by the scribe were at my direction and in my presence. I have reviewed the chart and agree that the record reflects my personal performance and is accurate and complete  Electronically signed:   Jigna Hensley MD  CT ICU attending     ICU time: 55 mins     By signing my name below, I, Remington Barriga, attest that this documentation has been prepared under the direction and in the presence of Jigna Hensley MD  Electronically signed: Remington Barriga, 12-11-24 @ 07:05             Patient seen and examined at the bedside.    Remains critically ill on continuous ICU monitoring, at risk for life threatening decompensation.  All Labs, data reviewed. Plan of care discussed in length during multi-disciplinary ICU rounds.     Brief Summary:  73 yo M s/p recent liver transplant now with Cardiogenic and Septic shock s/p IABP insertion, total abdominal colectomy on 12/7/2024  with open abdomen and bowel remains  in discontinuity, admitted to [facility] for IABP managment    24 Hour events:  Abdomen closed, S/p ileostomy creation  IABP d/c , TTE improved Biventricular function  Weaning Dobutamine, now at 3, stable SvO2  Off pressors, stable HDs  Increase Ca gtt dose, Bicarb IV   Lactic acidosis slowly improving  Start Trickle feeds, ostomy is functioning      Objective:  Vital Signs Last 24 Hrs  T(C): 37.3 (11 Dec 2024 04:00), Max: 37.5 (10 Dec 2024 16:00)  T(F): 99.1 (11 Dec 2024 04:00), Max: 99.5 (10 Dec 2024 16:00)  HR: 88 (11 Dec 2024 06:30) (67 - 101)  BP: --  BP(mean): --  RR: 24 (11 Dec 2024 06:30) (18 - 31)  SpO2: 96% (11 Dec 2024 06:30) (86% - 100%)    Parameters below as of 11 Dec 2024 04:00  Patient On (Oxygen Delivery Method): ventilator    O2 Concentration (%): 40    Mode: AC/ CMV (Assist Control/ Continuous Mandatory Ventilation)  RR (machine): 18  TV (machine): 500  FiO2: 40  PEEP: 8  ITime: 1  MAP: 12  PIP: 19              Physical Exam:   General: Intubated  Neurology: off sedation, follows commands, OT in bed  Respiratory: Bilateral breath sounds  CV: Sinus now, paroxysmal A fib  Abdominal: Soft, Nontender  Extremities: Warm, well-perfused  Castro  -------------------------------------------------------------------------------------------------------------------------------    Labs:                        11.3   9.56  )-----------( 27       ( 11 Dec 2024 00:32 )             34.1     12-11    137  |  101  |  34[H]  ----------------------------<  149[H]  4.6   |  14[L]  |  1.05    Ca    8.4      11 Dec 2024 00:34  Phos  3.1     12-11  Mg     2.4     12-11    TPro  4.0[L]  /  Alb  3.0[L]  /  TBili  9.3[H]  /  DBili  x   /  AST  43[H]  /  ALT  157[H]  /  AlkPhos  74  12-11    LIVER FUNCTIONS - ( 11 Dec 2024 00:34 )  Alb: 3.0 g/dL / Pro: 4.0 g/dL / ALK PHOS: 74 U/L / ALT: 157 U/L / AST: 43 U/L / GGT: x           PT/INR - ( 11 Dec 2024 00:33 )   PT: 21.3 sec;   INR: 1.88 ratio         PTT - ( 11 Dec 2024 00:33 )  PTT:52.1 sec  ABG - ( 11 Dec 2024 04:37 )  pH, Arterial: 7.35  pH, Blood: x     /  pCO2: 26    /  pO2: 142   / HCO3: 14    / Base Excess: -9.7  /  SaO2: 98.8        ALL MEDICATIONS   MEDICATIONS  (STANDING):  albumin human 25% IVPB 100 milliLiter(s) IV Intermittent every 8 hours  aMIOdarone Infusion 0.501 mG/Min (16.7 mL/Hr) IV Continuous <Continuous>  Calcium Chloride 5 Gram(s),Sodium Chloride 0.9% 400 milliLiter(s) 5 Gram(s) (16.67 mL/Hr) IV Continuous <Continuous>  caspofungin IVPB 50 milliGRAM(s) IV Intermittent every 24 hours  cefepime   IVPB 1000 milliGRAM(s) IV Intermittent every 8 hours  cefepime   IVPB      chlorhexidine 0.12% Liquid 15 milliLiter(s) Oral Mucosa every 12 hours  chlorhexidine 2% Cloths 1 Application(s) Topical <User Schedule>  CRRT Treatment    <Continuous>  dexMEDEtomidine Infusion 1 MICROgram(s)/kG/Hr (23.4 mL/Hr) IV Continuous <Continuous>  dextrose 50% Injectable 50 milliLiter(s) IV Push every 15 minutes  DOBUTamine Infusion 5 MICROgram(s)/kG/Min (14 mL/Hr) IV Continuous <Continuous>  ganciclovir IVPB 120 milliGRAM(s) IV Intermittent every 24 hours  hydrocortisone sodium succinate Injectable 50 milliGRAM(s) IV Push every 12 hours  insulin regular Infusion 1 Unit(s)/Hr (1 mL/Hr) IV Continuous <Continuous>  linezolid  IVPB 600 milliGRAM(s) IV Intermittent every 12 hours  metroNIDAZOLE  IVPB 500 milliGRAM(s) IV Intermittent every 12 hours  norepinephrine Infusion 0.01 MICROgram(s)/kG/Min (1.76 mL/Hr) IV Continuous <Continuous>  pantoprazole  Injectable 40 milliGRAM(s) IV Push every 12 hours  Phoxillum Filtration BK 4 / 2.5 5000 milliLiter(s) (1200 mL/Hr) CRRT <Continuous>  Phoxillum Filtration BK 4 / 2.5 5000 milliLiter(s) (250 mL/Hr) CRRT <Continuous>  PrismaSATE Dialysate BGK 4 / 2.5 5000 milliLiter(s) (1300 mL/Hr) CRRT <Continuous>  thiamine Injectable 100 milliGRAM(s) IV Push daily  trimethoprim / sulfamethoxazole IVPB 160 milliGRAM(s) IV Intermittent daily  vasopressin Infusion 0.01 Unit(s)/Min (1.5 mL/Hr) IV Continuous <Continuous>    MEDICATIONS  (PRN):    ------------------------------------------------------------------------------------------------------------------------------  Assessment:  73 yo M with cardiogenic shock s/p IABP insertion, total abdominal colectomy with ileostomy on 12/7/2024.     Cardiogenic shock  Hypovolemia  Lactic acidosis  Acute respiratory failure  Acute liver injury, transaminitis, hyperbilirubinemia  Acute kidney injury  Hypokalemia  Hypocalcemia  Acute blood loss anemia  Thrombocytopenia  E coli bacteremia    Plan:  ***Neuro***  Off precedex now, pain well controlled  Comfortable on PS on a vent    ***Cardiovascular***  At high risk for ongoing hemodynamic instability and cardiac arrhythmias.   Dobutamine weaning, now at 3, Stable SVO2, good CI  TTE shows improvement of Bivent function  IABP d/c  A fib paroxysmal on amio gtt   Jacqui 5 ppm   Albumin, platelets Cryo   Lactic acidosis improving    ***Pulmonary***  Postoperative acute respiratory failure  Remains on vent support, tolerated PS low settings all day  keep on a vent at night  Pulmonary toilet, nebs prn    ***GI***  Severe protein calorie malnutrition   s/p liver transplant with acute liver injury from sepsis - trend LFTs.  s/p total abdominal colectomy, abdominal wall closure  S/p ileostomy creation  Started on Trickle feeds  Monitor LFTS, hyperbilirubinemia  Protonix for ppx    ***Renal***  MORAIMA - on CRRT.  Castro catheter for strict I/O measurements.  Albumin 25%  Hypocalcemia - Calcium infusion  Scrotum edema, skin disruption,  US negative for testicular ischemia, abscess  Urology following    ***ID***  Merrem for e coli bacteremia , caspofungin   Linezolid course completed  CMV prophylaxis with Ganciclovir.  s/p Filgrastin.  Bactrim for prophylaxis.    ***Endocrine***  Monitor blood sugars, Insulin if needed.    ***Hematology***  Acute blood loss anemia and thrombocytopenia.  s/p Platelets, Cryo,DDIVP dose  elevated INR secondary to acute liver dysfunction and protein calorie malnutrition      I, Jigna Hensley MD, personally performed the services described in this documentation. all medical record entries made by the scribe were at my direction and in my presence. I have reviewed the chart and agree that the record reflects my personal performance and is accurate and complete  Electronically signed:   Jigna Hensley MD  CT ICU attending     ICU time: 55 mins     By signing my name below, I, Remington Barriga, attest that this documentation has been prepared under the direction and in the presence of Jigna Hensley MD  Electronically signed: Remington Barriga, 12-11-24 @ 07:05

## 2024-12-12 NOTE — PROGRESS NOTE ADULT - SUBJECTIVE AND OBJECTIVE BOX
[facility] DIVISION OF KIDNEY DISEASES AND HYPERTENSION -- FOLLOW UP NOTE  --------------------------------------------------------------------------------  Chief Complaint:  s/p OLT 11/15/24 with oliguric MORAIMA in setting of cardiogenic/septic shock.    24 hour events/subjective: Pt. seen and examined at bedside earlier today. Pt. remains intubated and sedated, off pressor support.       PAST HISTORY  --------------------------------------------------------------------------------  No significant changes to PMH, PSH, FHx, SHx, unless otherwise noted    ALLERGIES & MEDICATIONS  --------------------------------------------------------------------------------  Allergies    No Known Allergies    Intolerances      Standing Inpatient Medications  albumin human 25% IVPB 100 milliLiter(s) IV Intermittent every 6 hours  aMIOdarone Infusion 0.5 mG/Min IV Continuous <Continuous>  Calcium Chloride 7 Gram(s),Sodium Chloride 0.9% 600 milliLiter(s) 7 Gram(s) IV Continuous <Continuous>  cefepime   IVPB      cefepime   IVPB 1000 milliGRAM(s) IV Intermittent every 8 hours  chlorhexidine 0.12% Liquid 15 milliLiter(s) Oral Mucosa every 12 hours  chlorhexidine 2% Cloths 1 Application(s) Topical <User Schedule>  CRRT Treatment    <Continuous>  dexMEDEtomidine Infusion 1 MICROgram(s)/kG/Hr IV Continuous <Continuous>  dextrose 50% Injectable 50 milliLiter(s) IV Push every 15 minutes  DOBUTamine Infusion 2 MICROgram(s)/kG/Min IV Continuous <Continuous>  fluconAZOLE IVPB 400 milliGRAM(s) IV Intermittent every 24 hours  ganciclovir IVPB 120 milliGRAM(s) IV Intermittent every 24 hours  hydrocortisone sodium succinate Injectable 50 milliGRAM(s) IV Push every 12 hours  insulin regular Infusion 1 Unit(s)/Hr IV Continuous <Continuous>  metroNIDAZOLE  IVPB 500 milliGRAM(s) IV Intermittent every 12 hours  norepinephrine Infusion 0.01 MICROgram(s)/kG/Min IV Continuous <Continuous>  pantoprazole  Injectable 40 milliGRAM(s) IV Push every 12 hours  Phoxillum Filtration BK 4 / 2.5 5000 milliLiter(s) CRRT <Continuous>  PrismaSOL Filtration BGK 4 / 2.5 5000 milliLiter(s) CRRT <Continuous>  PrismaSOL Filtration BGK 4 / 2.5 5000 milliLiter(s) CRRT <Continuous>  sodium bicarbonate  Injectable 50 milliEquivalent(s) IV Push <User Schedule>  thiamine Injectable 100 milliGRAM(s) IV Push daily  vasopressin Infusion 0.01 Unit(s)/Min IV Continuous <Continuous>    PRN Inpatient Medications  levalbuterol Inhalation 0.63 milliGRAM(s) Inhalation every 8 hours PRN      REVIEW OF SYSTEMS  -------------------------------------------------------------------------------    All other systems were reviewed and are negative, except as noted.    VITALS/PHYSICAL EXAM  --------------------------------------------------------------------------------  T(C): 37 (12-12-24 @ 12:00), Max: 37.2 (12-12-24 @ 04:00)  HR: 86 (12-12-24 @ 14:00) (68 - 100)  BP: --  RR: 24 (12-12-24 @ 14:00) (21 - 41)  SpO2: 95% (12-12-24 @ 14:00) (93% - 100%)  Wt(kg): --        12-11-24 @ 07:01  -  12-12-24 @ 07:00  --------------------------------------------------------  IN: 4231.8 mL / OUT: 8012 mL / NET: -3780.2 mL    12-12-24 @ 07:01  -  12-12-24 @ 14:42  --------------------------------------------------------  IN: 1041.1 mL / OUT: 1769 mL / NET: -727.9 mL        Physical Exam:  Gen: critically ill, intubated and sedated.   Pulm: intubated.   CV: tachycardic, S1S2+  Abd: + distended. +incisions. +NGT in place. +ostomy   : +nichols catheter with dark colored urine  MSK: 2+ pitting above knee including scrotum  Skin: Warm  Access: R IJ non-tunneled HD catheter     LABS/STUDIES  --------------------------------------------------------------------------------              10.2   11.35 >-----------<  22       [12-12-24 @ 12:27]              30.3     139  |  103  |  38  ----------------------------<  152      [12-12-24 @ 12:27]  4.4   |  17  |  0.91        Ca     9.2     [12-12-24 @ 12:27]      Mg     2.4     [12-12-24 @ 12:27]      Phos  2.6     [12-12-24 @ 12:27]    TPro  4.3  /  Alb  3.6  /  TBili  12.2  /  DBili  x   /  AST  34  /  ALT  66  /  AlkPhos  74  [12-12-24 @ 12:27]    PT/INR: PT 22.1 , INR 1.95       [12-12-24 @ 12:27]  PTT: 56.7       [12-12-24 @ 12:27]      Creatinine Trend:  SCr 0.91 [12-12 @ 12:27]  SCr 0.97 [12-12 @ 05:00]  SCr 1.03 [12-12 @ 00:21]  SCr 1.09 [12-11 @ 20:33]  SCr 1.02 [12-11 @ 16:56]    Vitamin D (25OH) <6.0      [11-21-24 @ 08:32]  TSH 1.59      [11-21-24 @ 08:32]  Lipid: chol 114, , HDL 47, LDL --      [04-24-24 @ 06:48]    HBsAb 41.9      [11-15-24 @ 00:41]  HBsAg Nonreact      [11-15-24 @ 00:41]  HBcAb Nonreact      [11-15-24 @ 00:41]  HCV 0.06, Nonreact      [11-15-24 @ 00:41]  HIV Nonreact      [11-15-24 @ 00:41]

## 2024-12-12 NOTE — PROGRESS NOTE ADULT - SUBJECTIVE AND OBJECTIVE BOX
Transplant Surgery - Multidisciplinary Progress Note  --------------------------------------------------------------  OLT   11/15/2024        POD#27  Colectomy 2024   POD#5  IABP     Present:   Patient seen and examined with multidisciplinary Transplant team including Surgeon: Dr. Palomino, Dr. Sorto, JAZZ Colby, Hepatologist: Dr. Roe and ICU team in AM rounds.   Disciplines not in attendance will be notified of the plan.     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     s/p OLT 11/15 with post op course c/b:  ·	Hypoxia: Reintubated , extubated ->reintubated , extubated , reintubated   ·	Ileus   ·	Fever  ·	A fib  ·	AMS/Seizure   ·	L brachial DVT  ·	Neutropenia  ·	E coli Bacteremia ()  ·	s/p Coloctomy .   ·	IABP     Interval/Overnight Events:   - wound vac changed   - remains intubated on /vaso/amio, off levo and sedation  - TFs started, insulin gtt to maintain appropriate BG  - received 1cryo/1PLT and DDVAP x1    Immunosuppression:  -Cyclo (held), MMF held this am, on stress dose steroids  -ongoing monitoring for signs of rejection    Potential Discharge date: Pending clinical course  Education: Medications  Plan of care: See Below                                          ROS: Unable to assess patient is intubated, sedated    PHYSICAL EXAM:   Constitutional: intubated, sedated   Eyes:  PERRLA  ENMT: nc/at, no thrush   Neck: supple   Respiratory: CTA B/L  Cardiovascular: RRR  Gastrointestinal: incision clean/dry/intact + Ostomy pink   Genitourinary: Castro in place   Extremities: SCD's in place and working bilaterally, + LE edema  Neurological: intubated, sedated  Skin: no rashes, ulcerations, lesions Transplant Surgery - Multidisciplinary Progress Note  --------------------------------------------------------------  OLT   11/15/2024        POD#27  Colectomy 2024   POD#5  IABP     Present:   Patient seen and examined with multidisciplinary Transplant team including Surgeon: Dr. Palomino, Dr. Sorto, JAZZ Colby, Hepatologist: Dr. Roe and ICU team in AM rounds.   Disciplines not in attendance will be notified of the plan.     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     s/p OLT 11/15 with post op course c/b:  ·	Hypoxia: Reintubated , extubated ->reintubated , extubated , reintubated   ·	Ileus   ·	Fever  ·	A fib  ·	AMS/Seizure   ·	L brachial DVT  ·	Neutropenia  ·	E coli Bacteremia ()  ·	s/p Coloctomy .   ·	IABP , removed 12/10    Interval/Overnight Events:   - wound vac changed   - remains intubated on /vaso/amio, off levo and sedation  - TFs started, insulin gtt to maintain appropriate BG  - received 1cryo/1PLT and DDVAP x1    Immunosuppression:  -Cyclo (held), MMF held this am, on stress dose steroids  -ongoing monitoring for signs of rejection    Potential Discharge date: Pending clinical course  Education: Medications  Plan of care: See Below        MEDICATIONS  (STANDING):  albumin human 25% IVPB 100 milliLiter(s) IV Intermittent every 6 hours  albumin human 25% IVPB 100 milliLiter(s) IV Intermittent every 6 hours  aMIOdarone Infusion 0.5 mG/Min (16.7 mL/Hr) IV Continuous <Continuous>  Calcium Chloride 7 Gram(s),Sodium Chloride 0.9% 600 milliLiter(s) 7 Gram(s) (29.17 mL/Hr) IV Continuous <Continuous>  cefepime   IVPB      cefepime   IVPB 1000 milliGRAM(s) IV Intermittent every 8 hours  chlorhexidine 0.12% Liquid 15 milliLiter(s) Oral Mucosa every 12 hours  chlorhexidine 2% Cloths 1 Application(s) Topical <User Schedule>  CRRT Treatment    <Continuous>  dexMEDEtomidine Infusion 1 MICROgram(s)/kG/Hr (23.4 mL/Hr) IV Continuous <Continuous>  dextrose 50% Injectable 50 milliLiter(s) IV Push every 15 minutes  DOBUTamine Infusion 2 MICROgram(s)/kG/Min (5.62 mL/Hr) IV Continuous <Continuous>  fluconAZOLE IVPB 400 milliGRAM(s) IV Intermittent every 24 hours  ganciclovir IVPB 120 milliGRAM(s) IV Intermittent every 24 hours  hydrocortisone sodium succinate Injectable 50 milliGRAM(s) IV Push every 12 hours  insulin regular Infusion 1 Unit(s)/Hr (1 mL/Hr) IV Continuous <Continuous>  metroNIDAZOLE  IVPB 500 milliGRAM(s) IV Intermittent every 12 hours  norepinephrine Infusion 0.01 MICROgram(s)/kG/Min (1.76 mL/Hr) IV Continuous <Continuous>  pantoprazole  Injectable 40 milliGRAM(s) IV Push every 12 hours  Phoxillum Filtration BK 4 / 2.5 5000 milliLiter(s) (1500 mL/Hr) CRRT <Continuous>  PrismaSOL Filtration BGK 4 / 2.5 5000 milliLiter(s) (1250 mL/Hr) CRRT <Continuous>  PrismaSOL Filtration BGK 4 / 2.5 5000 milliLiter(s) (250 mL/Hr) CRRT <Continuous>  sodium bicarbonate  Injectable 50 milliEquivalent(s) IV Push <User Schedule>  thiamine Injectable 100 milliGRAM(s) IV Push daily  vasopressin Infusion 0.01 Unit(s)/Min (1.5 mL/Hr) IV Continuous <Continuous>    MEDICATIONS  (PRN):  levalbuterol Inhalation 0.63 milliGRAM(s) Inhalation every 8 hours PRN Shortness of breath      PAST MEDICAL & SURGICAL HISTORY:  Diabetes  Transaminitis  Paroxysmal atrial fibrillation  Depression  BPH (benign prostatic hyperplasia)  Hypertension  Chronic atrial fibrillation  Coronary artery disease  Hepatocellular carcinoma  DM (diabetes mellitus)  HTN (hypertension)  Paroxysmal atrial fibrillation  Cirrhosis  HCC (hepatocellular carcinoma)  History of BPH  History of laparoscopic cholecystectomy  History of lumbar laminectomy  H/O prior ablation treatment  H/O percutaneous left heart catheterization    Vital Signs Last 24 Hrs  T(C): 36.9 (12 Dec 2024 08:00), Max: 37.2 (12 Dec 2024 04:00)  T(F): 98.4 (12 Dec 2024 08:00), Max: 99 (12 Dec 2024 04:00)  HR: 75 (12 Dec 2024 09:) (68 - 129)  BP: --  BP(mean): --  RR: 36 (12 Dec 2024 09:) (21 - 37)  SpO2: 100% (12 Dec 2024 09:) (93% - 100%)    Parameters below as of 12 Dec 2024 08:  Patient On (Oxygen Delivery Method): ventilator    O2 Concentration (%): 40    I&O's Summary    11 Dec 2024 07:01  -  12 Dec 2024 07:00  --------------------------------------------------------  IN: 4231.8 mL / OUT: 8012 mL / NET: -3780.2 mL    12 Dec 2024 07:01  -  12 Dec 2024 09:05  --------------------------------------------------------  IN: 65.7 mL / OUT: 126 mL / NET: -60.3 mL                        9.9    9.64  )-----------( 32       ( 12 Dec 2024 05:00 )             29.5     12    138  |  105  |  38[H]  ----------------------------<  118[H]  4.2   |  19[L]  |  0.97    Ca    9.0      12 Dec 2024 05:00  Phos  2.5     12-12  Mg     2.4     12-12    TPro  4.1[L]  /  Alb  3.4  /  TBili  10.7[H]  /  DBili  x   /  AST  37  /  ALT  76[H]  /  AlkPhos  76  12-    Culture - Blood (collected 24 @ 15:30)  Source: .Blood BLOOD  Preliminary Report (24 @ 19:01):    No growth at 4 days    Culture - Body Fluid with Gram Stain (collected 24 @ 11:18)  Source: Body Fluid  Gram Stain (24 @ 18:22):    polymorphonuclear leukocytes seen    No organisms seen    by cytocentrifuge  Final Report (24 @ 08:53):    No growth at 5 days    Culture - Blood (collected 24 @ 13:30)  Source: .Blood BLOOD  Gram Stain (24 @ 03:44):    Growth in aerobic bottle: Gram Negative Rods    Growth in anaerobic bottle: Gram Negative Rods  Final Report (24 @ 13:18):    Growth in aerobic and anaerobic bottles: Escherichia coli    See previous culture [id number]    Culture - Blood (collected 24 @ 13:25)  Source: .Blood BLOOD  Gram Stain (24 @ 03:07):    Growth in aerobic and anaerobic bottles: Gram Negative Rods  Final Report (24 @ 13:17):    Growth in aerobic and anaerobic bottles: Escherichia coli    Direct identification is available within approximately 3-5    hours either by Blood Panel Multiplexed PCR or Direct    MALDI-TOF. Details: [email or web address]  Organism: Blood Culture PCR  Escherichia coli (24 @ 13:17)  Organism: Escherichia coli (24 @ 13:17)  Organism: Blood Culture PCR (24 @ 13:17)    Culture - Blood (collected 24 @ 14:15)  Source: .Blood BLOOD  Final Report (12-10-24 @ 18:00):    No growth at 5 days    Culture - Blood (collected 24 @ 14:00)  Source: .Blood BLOOD  Final Report (12-10-24 @ 18:00):    No growth at 5 days      ROS: Unable to assess patient is intubated, sedated    PHYSICAL EXAM:   Constitutional: intubated, sedated   Eyes:  PERRLA  ENMT: nc/at, no thrush   Neck: supple   Respiratory: CTA B/L  Cardiovascular: RRR  Gastrointestinal: incision clean/dry/intact + Ostomy pink   Genitourinary: Castro in place   Extremities: SCD's in place and working bilaterally, + LE edema  Neurological: intubated, sedated  Skin: no rashes, ulcerations, lesions

## 2024-12-12 NOTE — CONSULT NOTE ADULT - ASSESSMENT
****PENDING FINAL RECOMMENDATIONS THIS NOTE IS INCOMPLETE****  ___________________________  ASSESSMENT AND PLAN  #  ddx:     Recommendations:   -   - Dermatology will continue to follow.     The patient's chart was reviewed in addition to being seen and examined at bedside with the dermatology attending Dr. Escalera .  Recommendations were communicated with the primary team.  Please page [phone] with a 10-digit call-back number for further related question    LUISITO AVALOS MD  Resident Physician, PGY-3  [facility] Dermatology  Pager: [phone]  Office: [phone] ___________________________  ASSESSMENT AND PLAN  # Edema bullae  In the context of patient's anasarca, scrotal edema may take longer to improve than the extremities.    Recommendations:   - please consult wound care  - please send photo of patient's back when turning patient next  - may use Adaptic to cover the erosions on the body  - please start mupirocin ointment for the scrotum  - f/u HSV/VZV and fungal cx from the scrotum  - Dermatology will continue to follow.     The patient's chart was reviewed in addition to being seen and examined at bedside with the dermatology attending Dr. Escalera .  Recommendations were communicated with the primary team.  Please page [phone] with a 10-digit call-back number for further related question    LUISITO AVALOS MD  Resident Physician, PGY-3  [facility] Dermatology  Pager: [phone]  Office: [phone]

## 2024-12-12 NOTE — PROGRESS NOTE ADULT - ASSESSMENT
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 Post-op course c/b worsening mental status and acute hypoxic respiratory failure requiring multiple intubations/extubations, currently extubated (as of 11/26/24) and colonic ileus s/p several rounds of Relistor and Neostigmine with NGT and rectal tube currently in place now with septic shock of unclear etiology. Transplant nephrology consulted for metabolic acidosis and MORAIMA.    1. s/p OLT 11/15/24 with oliguric MORAIMA in setting of septic shock.  - Likely hemodynamically mediated in setting of cardiogenic and septic shock on multiple vasopressors and IABP.   - SCr on admission was 0.48 11/15/24, now uptrended to 1.90.  - UOP today is 70 cc, ~5-10 cc/hr,   - Urinalysis trace ketones, protein 30, 17 casts.   - CT AP non-con: Bilateral renal cysts including cysts with peripheral calcification in the left kidney.  - Given worsening acidosis, hypotension on vasopressors, oliguria, and signs of overload on exam, recommend CRRT at this time. Discussed with transplant hepatology team, would like to hold off for now. Please re-call for CRRT if needed.  - Pt. initiated on CRRT 12/7/24-.  - Continue with CRRT at this time, pt. appears to be tolerating well.   - BHB elevated at 2.3. Would repeat BHB level as well today.  - IS per transplant hepatology team   - Monitor labs and BPs. Avoid nephrotoxins including, ACE/ARB, NSAIDs, contrast, etc.     2. Metabolic Acidosis   - AGMA in setting of septic shock, starvation ketosis, lactic acidosis and MORAIMA.  - pH 7.30, pCO2 23. SCO2 10. on 12/6/24.   - Lactate elevated at 8.2 now improved to 3.8 today (12/12/24)  - BHB is 2.3, repeat.    - Will change the CRRT solutions above.   - Can continue bicarbonate pushes QID.     If you have any questions, please feel free to contact me:  Magaly Tello MD PGY-4  Nephrology Fellow  Pager [phone] / [employer] Teams (Preferred)  (After 5pm or on weekends please page the on-call fellow)

## 2024-12-12 NOTE — PROGRESS NOTE ADULT - SUBJECTIVE AND OBJECTIVE BOX
Patient seen and examined at the bedside.    Remains critically ill on continuous ICU monitoring, at risk for life threatening decompensation.  All Labs, data reviewed. Plan of care discussed in length during multi-disciplinary ICU rounds.       Brief Summary:  73 yo M s/p recent liver transplant now with Cardiogenic and Septic shock s/p IABP insertion, total abdominal colectomy on 12/7/2024  with open abdomen and bowel remains  in discontinuity, admitted to CTICU for IABP managment    24 Hour events:  Abdomen closed, S/p ileostomy creation  IABP d/c , TTE improved Biventricular function  Weaning Dobutamine, now at 3, stable SvO2  Off pressors, stable HDs  Increase Ca gtt dose, Bicarb IV   Lactic acidosis slowly improving  Start Trickle feeds, ostomy is functioning    Objective:  Vital Signs Last 24 Hrs  T(C): 37 (12 Dec 2024 10:23), Max: 37.2 (12 Dec 2024 04:00)  T(F): 98.6 (12 Dec 2024 10:23), Max: 99 (12 Dec 2024 04:00)  HR: 81 (12 Dec 2024 10:30) (68 - 100)  BP: --  BP(mean): --  RR: 31 (12 Dec 2024 10:30) (21 - 39)  SpO2: 100% (12 Dec 2024 10:30) (93% - 100%)    Parameters below as of 12 Dec 2024 08:00  Patient On (Oxygen Delivery Method): ventilator    O2 Concentration (%): 40    Mode: CPAP with PS  FiO2: 40  PEEP: 7  PS: 10  ITime: 1  MAP: 11  PIP: 18    Physical Exam:  General: Intubated  Neurology: off sedation, follows commands, OT in bed  Respiratory: Bilateral breath sounds  CV: Sinus now, paroxysmal A fib  Abdominal: Soft, Nontender  Extremities: Warm, well-perfused  Castro  -------------------------------------------------------------------------------------------------------------------------------    Labs:                        9.9    9.64  )-----------( 32       ( 12 Dec 2024 05:00 )             29.5     12-12    138  |  105  |  38[H]  ----------------------------<  118[H]  4.2   |  19[L]  |  0.97    Ca    9.0      12 Dec 2024 05:00  Phos  2.5     12-12  Mg     2.4     12-12    TPro  4.1[L]  /  Alb  3.4  /  TBili  10.7[H]  /  DBili  x   /  AST  37  /  ALT  76[H]  /  AlkPhos  76  12-12    LIVER FUNCTIONS - ( 12 Dec 2024 05:00 )  Alb: 3.4 g/dL / Pro: 4.1 g/dL / ALK PHOS: 76 U/L / ALT: 76 U/L / AST: 37 U/L / GGT: x           PT/INR - ( 12 Dec 2024 00:21 )   PT: 20.9 sec;   INR: 1.83 ratio         PTT - ( 12 Dec 2024 05:00 )  PTT:56.6 sec  ABG - ( 12 Dec 2024 04:57 )  pH, Arterial: 7.43  pH, Blood: x     /  pCO2: 31    /  pO2: 126   / HCO3: 21    / Base Excess: -3.0  /  SaO2: 100.0     ALL MEDICATIONS   MEDICATIONS  (STANDING):  albumin human 25% IVPB 100 milliLiter(s) IV Intermittent every 6 hours  albumin human 25% IVPB 100 milliLiter(s) IV Intermittent every 6 hours  aMIOdarone Infusion 0.5 mG/Min (16.7 mL/Hr) IV Continuous <Continuous>  Calcium Chloride 7 Gram(s),Sodium Chloride 0.9% 600 milliLiter(s) 7 Gram(s) (29.17 mL/Hr) IV Continuous <Continuous>  cefepime   IVPB      cefepime   IVPB 1000 milliGRAM(s) IV Intermittent every 8 hours  chlorhexidine 0.12% Liquid 15 milliLiter(s) Oral Mucosa every 12 hours  chlorhexidine 2% Cloths 1 Application(s) Topical <User Schedule>  CRRT Treatment    <Continuous>  dexMEDEtomidine Infusion 1 MICROgram(s)/kG/Hr (23.4 mL/Hr) IV Continuous <Continuous>  dextrose 50% Injectable 50 milliLiter(s) IV Push every 15 minutes  DOBUTamine Infusion 2 MICROgram(s)/kG/Min (5.62 mL/Hr) IV Continuous <Continuous>  fluconAZOLE IVPB 400 milliGRAM(s) IV Intermittent every 24 hours  ganciclovir IVPB 120 milliGRAM(s) IV Intermittent every 24 hours  hydrocortisone sodium succinate Injectable 50 milliGRAM(s) IV Push every 12 hours  insulin regular Infusion 1 Unit(s)/Hr (1 mL/Hr) IV Continuous <Continuous>  metroNIDAZOLE  IVPB 500 milliGRAM(s) IV Intermittent every 12 hours  norepinephrine Infusion 0.01 MICROgram(s)/kG/Min (1.76 mL/Hr) IV Continuous <Continuous>  pantoprazole  Injectable 40 milliGRAM(s) IV Push every 12 hours  Phoxillum Filtration BK 4 / 2.5 5000 milliLiter(s) (1500 mL/Hr) CRRT <Continuous>  PrismaSOL Filtration BGK 4 / 2.5 5000 milliLiter(s) (1250 mL/Hr) CRRT <Continuous>  PrismaSOL Filtration BGK 4 / 2.5 5000 milliLiter(s) (250 mL/Hr) CRRT <Continuous>  sodium bicarbonate  Injectable 50 milliEquivalent(s) IV Push <User Schedule>  thiamine Injectable 100 milliGRAM(s) IV Push daily  vasopressin Infusion 0.01 Unit(s)/Min (1.5 mL/Hr) IV Continuous <Continuous>    MEDICATIONS  (PRN):  levalbuterol Inhalation 0.63 milliGRAM(s) Inhalation every 8 hours PRN Shortness of breath  ------------------------------------------------------------------------------------------------------------------------------  Assessment:  73 yo M with cardiogenic shock s/p IABP insertion, total abdominal colectomy with ileostomy on 12/7/2024.     Cardiogenic shock  Hypovolemia  Lactic acidosis  Acute respiratory failure  Acute liver injury, transaminitis, hyperbilirubinemia  Acute kidney injury  Hypokalemia  Hypocalcemia  Acute blood loss anemia  Thrombocytopenia  E coli bacteremia    Plan:  ***Neuro***  Off precedex now, pain well controlled  Comfortable on PS on a vent    ***Cardiovascular***  At high risk for ongoing hemodynamic instability and cardiac arrhythmias.   Dobutamine weaning, now at 3, Stable SVO2, good CI  TTE shows improvement of Bivent function  IABP d/c  A fib paroxysmal on amio gtt   Jacqui 5 ppm   Albumin, platelets Cryo   Lactic acidosis improving    ***Pulmonary***  Postoperative acute respiratory failure  Remains on vent support, tolerated PS low settings all day  keep on a vent at night  Pulmonary toilet, nebs prn    ***GI***  Severe protein calorie malnutrition   s/p liver transplant with acute liver injury from sepsis - trend LFTs.  s/p total abdominal colectomy, abdominal wall closure  S/p ileostomy creation  Started on Trickle feeds  Monitor LFTS, hyperbilirubinemia  Protonix for ppx    ***Renal***  MORAIMA - on CRRT.  Castro catheter for strict I/O measurements.  Albumin 25%  Hypocalcemia - Calcium infusion  Scrotum edema, skin disruption,  US negative for testicular ischemia, abscess  Urology following    ***ID***  Merrem for e coli bacteremia , caspofungin   Linezolid course completed  CMV prophylaxis with Ganciclovir.  s/p Filgrastin.  Bactrim for prophylaxis.    ***Endocrine***  Monitor blood sugars, Insulin if needed.    ***Hematology***  Acute blood loss anemia and thrombocytopenia.  s/p Platelets, Cryo,DDIVP dose  elevated INR secondary to acute liver dysfunction and protein calorie malnutrition        IJigna MD, personally performed the services described in this documentation. All medical record entries made by the scribe were at my direction and in my presence. I have reviewed the chart and agree that the record reflects my personal performance and is accurate and complete  Electronically signed:   Jigna Hensley MD  CT ICU attending     ICU time: ** mins     By signing my name below, I, Willian Montes De Oca, attest that this documentation has been prepared under the direction and in the presence of Jigna Hensley MD  Electronically signed: Willian Montes De Oca, 12-12-24 @ 11:05             Patient seen and examined at the bedside.    Remains critically ill on continuous ICU monitoring, at risk for life threatening decompensation.  All Labs, data reviewed. Plan of care discussed in length during multi-disciplinary ICU rounds.     Brief Summary:  75 yo M s/p recent liver transplant now with Cardiogenic and Septic shock s/p IABP insertion, total abdominal colectomy on 12/7/2024  with open abdomen and bowel remains  in discontinuity, admitted to CTICU for IABP management    24 Hour events:  Abdomen closed, S/p ileostomy creation  Weaning Dobutamine, now at 3, stable SvO2  Off pressors  Lactic acidosis slowly improving, hypocalcemia improved  Severe thrombocytopenia, s/p Cryo and Platlets    Objective:  Vital Signs Last 24 Hrs  T(C): 37 (12 Dec 2024 10:23), Max: 37.2 (12 Dec 2024 04:00)  T(F): 98.6 (12 Dec 2024 10:23), Max: 99 (12 Dec 2024 04:00)  HR: 81 (12 Dec 2024 10:30) (68 - 100)  BP: --  BP(mean): --  RR: 31 (12 Dec 2024 10:30) (21 - 39)  SpO2: 100% (12 Dec 2024 10:30) (93% - 100%)    Parameters below as of 12 Dec 2024 08:00  Patient On (Oxygen Delivery Method): ventilator    O2 Concentration (%): 40    Mode: CPAP with PS  FiO2: 40  PEEP: 7  PS: 10  ITime: 1  MAP: 11  PIP: 18    Physical Exam:  General: Intubated  Neurology: off sedation, follows commands, OT in bed  Respiratory: Bilateral breath sounds  CV: Sinus now, paroxysmal A fib  Abdominal: Soft, Nontender  Extremities: Warm, well-perfused  Castro  -------------------------------------------------------------------------------------------------------------------------------    Labs:                        9.9    9.64  )-----------( 32       ( 12 Dec 2024 05:00 )             29.5     12-12    138  |  105  |  38[H]  ----------------------------<  118[H]  4.2   |  19[L]  |  0.97    Ca    9.0      12 Dec 2024 05:00  Phos  2.5     12-12  Mg     2.4     12-12    TPro  4.1[L]  /  Alb  3.4  /  TBili  10.7[H]  /  DBili  x   /  AST  37  /  ALT  76[H]  /  AlkPhos  76  12-12    LIVER FUNCTIONS - ( 12 Dec 2024 05:00 )  Alb: 3.4 g/dL / Pro: 4.1 g/dL / ALK PHOS: 76 U/L / ALT: 76 U/L / AST: 37 U/L / GGT: x           PT/INR - ( 12 Dec 2024 00:21 )   PT: 20.9 sec;   INR: 1.83 ratio      PTT - ( 12 Dec 2024 05:00 )  PTT:56.6 sec  ABG - ( 12 Dec 2024 04:57 )  pH, Arterial: 7.43  pH, Blood: x     /  pCO2: 31    /  pO2: 126   / HCO3: 21    / Base Excess: -3.0  /  SaO2: 100.0     ALL MEDICATIONS   MEDICATIONS  (STANDING):  albumin human 25% IVPB 100 milliLiter(s) IV Intermittent every 6 hours  albumin human 25% IVPB 100 milliLiter(s) IV Intermittent every 6 hours  aMIOdarone Infusion 0.5 mG/Min (16.7 mL/Hr) IV Continuous <Continuous>  Calcium Chloride 7 Gram(s),Sodium Chloride 0.9% 600 milliLiter(s) 7 Gram(s) (29.17 mL/Hr) IV Continuous <Continuous>  cefepime   IVPB      cefepime   IVPB 1000 milliGRAM(s) IV Intermittent every 8 hours  chlorhexidine 0.12% Liquid 15 milliLiter(s) Oral Mucosa every 12 hours  chlorhexidine 2% Cloths 1 Application(s) Topical <User Schedule>  CRRT Treatment    <Continuous>  dexMEDEtomidine Infusion 1 MICROgram(s)/kG/Hr (23.4 mL/Hr) IV Continuous <Continuous>  dextrose 50% Injectable 50 milliLiter(s) IV Push every 15 minutes  DOBUTamine Infusion 2 MICROgram(s)/kG/Min (5.62 mL/Hr) IV Continuous <Continuous>  fluconAZOLE IVPB 400 milliGRAM(s) IV Intermittent every 24 hours  ganciclovir IVPB 120 milliGRAM(s) IV Intermittent every 24 hours  hydrocortisone sodium succinate Injectable 50 milliGRAM(s) IV Push every 12 hours  insulin regular Infusion 1 Unit(s)/Hr (1 mL/Hr) IV Continuous <Continuous>  metroNIDAZOLE  IVPB 500 milliGRAM(s) IV Intermittent every 12 hours  norepinephrine Infusion 0.01 MICROgram(s)/kG/Min (1.76 mL/Hr) IV Continuous <Continuous>  pantoprazole  Injectable 40 milliGRAM(s) IV Push every 12 hours  Phoxillum Filtration BK 4 / 2.5 5000 milliLiter(s) (1500 mL/Hr) CRRT <Continuous>  PrismaSOL Filtration BGK 4 / 2.5 5000 milliLiter(s) (1250 mL/Hr) CRRT <Continuous>  PrismaSOL Filtration BGK 4 / 2.5 5000 milliLiter(s) (250 mL/Hr) CRRT <Continuous>  sodium bicarbonate  Injectable 50 milliEquivalent(s) IV Push <User Schedule>  thiamine Injectable 100 milliGRAM(s) IV Push daily  vasopressin Infusion 0.01 Unit(s)/Min (1.5 mL/Hr) IV Continuous <Continuous>    MEDICATIONS  (PRN):  levalbuterol Inhalation 0.63 milliGRAM(s) Inhalation every 8 hours PRN Shortness of breath  ------------------------------------------------------------------------------------------------------------------------------  Assessment:  75 yo M with cardiogenic shock s/p IABP insertion, total abdominal colectomy with ileostomy on 12/7/2024.     Cardiogenic shock  Hypovolemia  Lactic acidosis  Acute respiratory failure  Acute liver injury, transaminitis, hyperbilirubinemia  Acute kidney injury  Hypokalemia  Hypocalcemia  Acute blood loss anemia  Thrombocytopenia  E coli bacteremia    Plan:  ***Neuro***  Off precedex now, pain well controlled  Comfortable on PS on a vent    ***Cardiovascular***  At high risk for ongoing hemodynamic instability and cardiac arrhythmias.   Dobutamine weaning, now at 2.5, Stable SVO2, good CI  TTE shows improvement of Bivent function  A fib paroxysmal on amio gtt   off Jacqui  Lactic acidosis improving    ***Pulmonary***  Postoperative acute respiratory failure  Remains on vent support, tolerated PS low settings all day  keep on a vent at night  Pulmonary toilet, nebs prn    ***GI***  Severe protein calorie malnutrition   s/p liver transplant with acute liver injury from sepsis - trend LFTs.  s/p total abdominal colectomy, abdominal wall closure  S/p ileostomy creation  Trickle feeds  Monitor LFTS, hyperbilirubinemia  Protonix for ppx    ***Renal***  MORAIMA - on CRRT.  Castro catheter for strict I/O measurements.  Hypocalcemia - Calcium infusion  Scrotum edema, skin disruption,  US negative for testicular ischemia, abscess  Urology following    ***ID***  Merrem for e coli bacteremia ,  caspofungin and Linezolid course completed   Now on flucanazole  CMV prophylaxis with Ganciclovir.  s/p Filgrastin.  Off bactrum    ***Endocrine***  Monitor blood sugars, Insulin if needed.    ***Hematology***  Acute blood loss anemia and thrombocytopenia.  s/p Platelets, Cryo,DDIVP dose  elevated INR secondary to acute liver dysfunctio  and protein calorie malnutrition      IJigna MD, personally performed the services described in this documentation. All medical record entries made by the scribe were at my direction and in my presence. I have reviewed the chart and agree that the record reflects my personal performance and is accurate and complete  Electronically signed:   Jigna Hensley MD  CT ICU attending     ICU time: 50 mins     By signing my name below, I, Willian Montes De Oca, attest that this documentation has been prepared under the direction and in the presence of Jigna Hensley MD  Electronically signed: Willian Montes De Oca, 12-12-24 @ 11:05

## 2024-12-12 NOTE — PROGRESS NOTE ADULT - ASSESSMENT
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 Post-op course c/b worsening mental status and acute hypoxic respiratory failure requiring multiple intubations/extubations, currently intubated (as of 12/7/2024) and cardiogenic shock s/p Percutaneous insertion of an intra-aortic balloon pump and septic shock/colonic ischemia s/p total abdominal colectomy with ileostomy on 12/7/24,  s/p ex-lap w/ileostomy on 12/9/24. Urology consulted for scrotal edema w/ large skin breakdown. The symptom start one week ago and getting worse recent two days with large area skin breakdown and redness on the whole scrotal area, as well the bilateral groin and internal thigh. Castro in place with clear yellowish colored urine.   In CTU, pt WBC 7, H/H 10/32, Cr 1.29, Lactate 8.7. blood culture on 12/6 growth E. coli, and no growth on 12/7. Patient off pressor currently.    Scrotal edema & skin breakdown possibly 2/2 intra-abdominal fluid tracking vs third-spacing from cardiogenic shock    Recommendations:   - No acute urologic intervention indicated   - Scrotal elevation  - Serial examination of scrotum  - Ultrasound scrotum -> US reviewed and consistent with significant with soft tissue edema. US notes that no arterial flow visualized to testicles bilaterally which is likely 2/2 artifact from significant edema. Low concern for testicular ischemia given that when R testicle is palpated it is soft. Would recommend repeating US today to monitor for flow.   - Wound care consult   - No surgical intervention indicated, local wound care  - Rest of care per primary team  - Discussed with Dr. Collier    Plan discussed w/ Dr. Benito Collier  Urology

## 2024-12-12 NOTE — PROGRESS NOTE ADULT - ASSESSMENT
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     s/p OLT 11/15 with complicated post op course    [] Septic shock/Cardiogenic shock  [] s/p Colectomy 12/7 POD#5  [] RTOR for closure and Ileostomy creation   [] IAPB 12/7- removed 12/10: CTICU on board  - E coli Bacteremia (12/6) - on jeniffer/caspo. -> switched to  cefepime/ flagyl. Received Tobra x 1 12/6    - dc linezolid   - Neutropenia: received Neupogen 480mcg x2  - MORAIMA: CRRT started 12/7, low u/o  - trend lactate  - DDVAP x1, cryo 1 unit, plt 1 unit   - echo 12/11    [] s/p OLT POD #27  - trend LFT's  - Diet: start tube feeds   - Pain management: avoid narcotics  - Strict I&Os, nichols, wound vac  - US: L brachial DVT   - wound vac     [ ] Immunosuppression  - Tacrolimus stopped 11/18 in setting of AMS/Seizure, Started Cyclo 11/24  - Cyclo held, MMF held, on stress dose steroids  - PPx: Gancyclovir, bactrim    [] lleus   -@ home on Linzess  - imaging with distended colon (likely opioid induced)  - s/p Neostigmine x 2  - s/p Relistor, last dose 12/1  - s/p colectomy  (see above)  - Daily AXR     [] CMV viremia  - currently on Gancyclovir   - repeat CMV PCR     [ ] AMS  - Reintubated 11/20 Aspiration/hypoxia, extubated 11/24->[id number]--> extubated 11/26 -> [id number]  - EEG 11/30 negative, Neurology following  -  following   - off tacro  - on keppra    [ ] HTN/ pAFib  - On Amiodarone  - metoprolol held (hypotensive)   - Eliquis 5mg bid - held 12/6.   - Dr. 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     s/p OLT 11/15 with complicated post op course    [] Septic shock/Cardiogenic shock  [] s/p Colectomy 12/7 POD#5  [] RTOR for closure and Ileostomy creation   [] IAPB 12/7- removed 12/10: CTICU on board  - E coli Bacteremia (12/6) - on jeniffer/caspo -> switched to  cefepime/ flagyl/fluc Received Tobra x 1 12/6 .  - Incr Fluc 400mg   - Neutropenia: received Neupogen 480mcg x2  - MORAIMA: CRRT started 12/7, low u/o  - solucortef q12, dc florinef  - trend lactate  - echo 12/11    [] s/p OLT POD #27  - trend LFT's  - Diet: trickle tube feeds   - Pain management: avoid narcotics  - Strict I&Os, nichols, wound vac placed 12/10   - US: L brachial DVT   - wound vac     [ ] Immunosuppression  - Tacrolimus stopped 11/18 in setting of AMS/Seizure, Started Cyclo 11/24  - Cyclo held, MMF held, on stress dose steroids  - PPx: Gancyclovir, bactrim    [] lleus   -@ home on Linzess  - imaging with distended colon (likely opioid induced)  - s/p Neostigmine x 2  - s/p Relistor, last dose 12/1  - s/p colectomy  (see above)  - Daily AXR     [] CMV viremia  - currently on Gancyclovir   - repeat CMV PCR     [ ] AMS  - Reintubated 11/20 Aspiration/hypoxia, extubated 11/24->[id number]--> extubated 11/26 -> [id number]/7  - EEG 11/30 negative, Neurology following  - BH following   - off tacro  - on keppra    [ ] HTN/ pAFib  - On Amiodarone  - metoprolol held (hypotensive)   - Eliquis 5mg bid - held 12/6.   - Dr. Quintanilla following    [ ] DM  - ISS

## 2024-12-12 NOTE — PROGRESS NOTE ADULT - SUBJECTIVE AND OBJECTIVE BOX
Patient seen and examined at the bedside.    Remained critically ill on continuous ICU monitoring.    OBJECTIVE:  Vital Signs Last 24 Hrs  T(C): 37.1 (12 Dec 2024 18:25), Max: 37.2 (12 Dec 2024 04:00)  T(F): 98.8 (12 Dec 2024 18:25), Max: 99 (12 Dec 2024 04:00)  HR: 82 (12 Dec 2024 19:00) (68 - 90)  BP: --  BP(mean): --  RR: 34 (12 Dec 2024 19:00) (21 - 41)  SpO2: 96% (12 Dec 2024 19:00) (93% - 100%)    Parameters below as of 12 Dec 2024 16:00  Patient On (Oxygen Delivery Method): ventilator    O2 Concentration (%): 40      Physical Exam:   General: Intubated   Neurology: Sedated, able to follow commands off sedation  Eyes: bilateral pupils equal and reactive   ENT/Neck: Neck supple, trachea midline, No JVD   Respiratory: Clear bilaterally   CV: S1S2, no murmurs        [x] Abdominal VAC, [x] Bilateral Abd Bulb         [x] Afib  Abdominal: Soft, NT, ND +BS   Extremities: 1-2+ pedal edema noted, + peripheral pulses   Skin: No Rashes, Hematoma, Ecchymosis                           Assessment:  73 yo M with cardiogenic shock s/p IABP insertion, total abdominal colectomy with ileostomy on 12/7/2024.   S/P IABP removal on 12/10    Cardiogenic shock  Hypovolemia  Lactic acidosis  Acute respiratory failure  Acute liver injury, transaminitis, hyperbilirubinemia  Acute kidney injury  Hypokalemia  Hypocalcemia  Acute blood loss anemia  Thrombocytopenia  E coli bacteremia      Plan:   ***Neuro***  [x] Sedated with [x] Precedex for comfort/vent synchrony.   Post operative neuro assessment     ***Cardiovascular***  Invasive hemodynamic monitoring, assess perfusion indices   Afib / CVP 15 / MAP 95 / PAP 22 / Hct 30.3/ Lactate 4.2  [x] Amiodarone 0.5 mG/Min   [x] Dobutamine 2 mcgs/kG/Min   [x] Vasopressin 0.01 Units/Min   [x] Levophed 0.01 mcg/kg/min   [x] s/p IABP removal 12/10  Volume: [x] 5 Cryo [x] 2u plts   100ccs Albumin for hypovolemia / volume removal   Reassessment of hemodynamics post resuscitation  Serial EKG and cardiac enzymes   New wound vac placed today 12/11    ***Pulmonary***  [x] Jacqui 5 PPM  Postoperative acute respiratory failure  Titration of FiO2 and PEEP, follow SpO2, CXR, blood gasses   Continue bronchodilators as needed      Mode: AC/ CMV (Assist Control/ Continuous Mandatory Ventilation)  RR (machine): 14  TV (machine): 500  FiO2: 40  PEEP: 7  ITime: 1  MAP: 13  PIP: 21              ***GI***  s/p liver transplant with acute liver injury from sepsis - trend LFTs.  s/p total abdominal colectomy  [x] Trickle TFs @ goal of 10ccs  Remains in discontinuity.  VAC with high output but thin drainage - ongoing resuscitation to keep I/O close to even.  Return to OR 12/10 for re-exploration, possible stoma, possible closure.  [x] Protonix for stress ulcer ppx    ***Renal***  [x] MORAIMA - on CRRT.  Continue to monitor I/Os, BUN/Creatinine.   Replete lytes PRN  Matthew present  Hypocalcemia - on Calcium Chloride infusion, trend levels    ***ID***  Caspo d/c and deescalated to Fluconazole    CMV prophylaxis with Ganciclovir.  Trend leukopenia s/p Filgrastin.  Bactrim for prophylaxis, switched to Mepron d/t renal fx   Cefepime for bacteremia    Flagyl added for ABD coverage     ***Endocrine***  [x] Stress Hyperglycemia: HbA1c 5.6%                - [x] Insulin gtt             - Need tight glycemic control to prevent wound infection.            Patient requires continuous monitoring with bedside rhythm monitoring, pulse oximetry monitoring, and continuous central venous and arterial pressure monitoring; and intermittent blood gas analysis. Care plan discussed with the ICU care team.   Patient remained critical, at risk for life threatening decompensation.    I have spent 55 minutes providing critical care management to this patient.    By signing my name below, I, Claudia Craig, attest that this documentation has been prepared under the direction and in the presence of Jagdeep Lopez NP.   Electronically signed: Nava Ponce, 12-12-24 @ 19:25    I, Jagdeep Lopez , personally performed the services described in this documentation. all medical record entries made by the nava were at my direction and in my presence. I have reviewed the chart and agree that the record reflects my personal performance and is accurate and complete  Electronically signed: Jagdeep Lopez NP.  Patient seen and examined at the bedside.    Remained critically ill on continuous ICU monitoring.    OBJECTIVE:  Vital Signs Last 24 Hrs  T(C): 37.1 (12 Dec 2024 18:25), Max: 37.2 (12 Dec 2024 04:00)  T(F): 98.8 (12 Dec 2024 18:25), Max: 99 (12 Dec 2024 04:00)  HR: 82 (12 Dec 2024 19:00) (68 - 90)  RR: 34 (12 Dec 2024 19:00) (21 - 41)  SpO2: 96% (12 Dec 2024 19:00) (93% - 100%)    Parameters below as of 12 Dec 2024 16:00  Patient On (Oxygen Delivery Method): ventilator    O2 Concentration (%): 40      Physical Exam:   General: Intubated   Neurology: Sedated, able to follow commands off sedation  Eyes: bilateral pupils equal and reactive   ENT/Neck: Neck supple, trachea midline, No JVD   Respiratory: Clear bilaterally   CV: S1S2, no murmurs        [x] Abdominal VAC, [x] Bilateral Abd Bulb         [x] Afib  Abdominal: Soft, NT, ND   Extremities: 1-2+ pedal edema noted, + peripheral pulses   Skin: No Rashes, Hematoma, Ecchymosis                           Assessment:  73 yo M with cardiogenic shock s/p IABP insertion, total abdominal colectomy with ileostomy on 12/7/2024.   S/P IABP removal on 12/10    Cardiogenic shock  Hypovolemia  Lactic acidosis  Acute respiratory failure  Acute liver injury, transaminitis, hyperbilirubinemia  Acute kidney injury  Hypokalemia  Hypocalcemia  Acute blood loss anemia  Thrombocytopenia  E coli bacteremia      Plan:   ***Neuro***  [x] Sedated with [x] Precedex for comfort/vent synchrony.   Post operative neuro assessment     ***Cardiovascular***  Invasive hemodynamic monitoring, assess perfusion indices   Afib / CVP 15 / MAP 95 / PAP 22 / Hct 30.3/ Lactate 4.2  [x] Amiodarone 0.5 mG/Min   [x] Dobutamine 2 mcgs/kG/Min   [x] Vasopressin 0.01 Units/Min   [x] Levophed 0.01 mcg/kg/min   [x] s/p IABP removal 12/10  Volume: [x] 5 Cryo [x] 2u plts   100ccs Albumin for hypovolemia / volume removal   Reassessment of hemodynamics post resuscitation  New wound vac placed today 12/11    ***Pulmonary***  [x] Jacqui 5 PPM, attempted wean unsuccessful   Postoperative acute respiratory failure  Titration of FiO2 and PEEP, follow SpO2, CXR, blood gasses   Continue bronchodilators as needed      Mode: AC/ CMV (Assist Control/ Continuous Mandatory Ventilation)  RR (machine): 14  TV (machine): 500  FiO2: 40  PEEP: 7  ITime: 1  MAP: 13  PIP: 21              ***GI***  s/p liver transplant with acute liver injury from sepsis - trend LFTs.  s/p total abdominal colectomy  [x] Trickle TFs @ goal of 10ccs  Remains in discontinuity.  VAC with high output but thin drainage - ongoing resuscitation to keep I/O close to even.  Return to OR 12/10 for re-exploration, possible stoma, possible closure.  [x] Protonix for stress ulcer ppx    ***Renal***  [x] MORAIMA - on CRRT.  Continue to monitor I/Os, BUN/Creatinine.   Replete lytes PRN  Matthew present  Hypocalcemia - on Calcium Chloride infusion, trend levels    ***ID***  Caspo d/c and deescalated to Fluconazole    CMV prophylaxis with Ganciclovir.  Trend leukopenia s/p Filgrastin.  Bactrim for prophylaxis, switched to Mepron d/t renal fx   Cefepime for bacteremia    Flagyl added for ABD coverage     ***Endocrine***  [x] Stress Hyperglycemia: HbA1c 5.6%                - [x] Insulin gtt             - Need tight glycemic control to prevent wound infection.            Patient requires continuous monitoring with bedside rhythm monitoring, pulse oximetry monitoring, and continuous central venous and arterial pressure monitoring; and intermittent blood gas analysis. Care plan discussed with the ICU care team.   Patient remained critical, at risk for life threatening decompensation.    I have spent 55 minutes providing critical care management to this patient.    By signing my name below, I, Claudia Craig, attest that this documentation has been prepared under the direction and in the presence of Jagdeep Lopez NP.   Electronically signed: Nava Ponce, 12-12-24 @ 19:25    I, Jagdeep Lopez , personally performed the services described in this documentation. all medical record entries made by the nava were at my direction and in my presence. I have reviewed the chart and agree that the record reflects my personal performance and is accurate and complete  Electronically signed: Jagdeep Lopez NP.

## 2024-12-12 NOTE — CONSULT NOTE ADULT - SUBJECTIVE AND OBJECTIVE BOX
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 Pt underwent Y90 Sept, 2023 with favorable treatment response.Pt completed OLTx evaluation and listed for OLTx 5/03/2024.     Patient is s/p ILD 11/15/24 with course c/b AMS, AHRF, cardiogenic and septic shock, requiring total ectomy with ileostomy 12/7 and 12/9, remains intubated.    Dermatology consulted for scrotal edema and skin breakdown x 1-2 weeks. Per patient family at bedside, patient started to have scrotal and inner thigh swelling about 1-2 weeks ago, but recently the skin has started to break down and is very red, with worsening of the swelling. They deny noting any other rashes on the body and specifically deny oral lesions.        PAST MEDICAL & SURGICAL HISTORY:  Diabetes      Transaminitis      Paroxysmal atrial fibrillation      Depression      BPH (benign prostatic hyperplasia)      Hypertension      Chronic atrial fibrillation      Coronary artery disease      Hepatocellular carcinoma      DM (diabetes mellitus)      HTN (hypertension)      Paroxysmal atrial fibrillation      Cirrhosis      HCC (hepatocellular carcinoma)      History of BPH      History of laparoscopic cholecystectomy      History of lumbar laminectomy      H/O prior ablation treatment      H/O percutaneous left heart catheterization          REVIEW OF SYSTEMS:    CONSTITUTIONAL:  No weakness, fevers or chills  EYES/ENT:  No visual changes;  No vertigo or throat pain   NECK:  No pain or stiffness  RESPIRATORY:  No cough, wheezing, hemoptysis; No shortness of breath  CARDIOVASCULAR:  No chest pain or palpitations  GASTROINTESTINAL:  per HPI  GENITOURINARY:  No dysuria, frequency or hematuria  MUSCULOSKELETAL:  FROM all extremities, normal strength, No calf tenderness  NEUROLOGICAL:  No numbness or weakness  SKIN:  per HPI    MEDICATIONS  (STANDING):  albumin human 25% IVPB 100 milliLiter(s) IV Intermittent every 6 hours  aMIOdarone Infusion 0.5 mG/Min IV Continuous <Continuous>  Calcium Chloride 7 Gram(s),Sodium Chloride 0.9% 600 milliLiter(s) 7 Gram(s) IV Continuous <Continuous>  cefepime   IVPB      cefepime   IVPB 1000 milliGRAM(s) IV Intermittent every 8 hours  chlorhexidine 0.12% Liquid 15 milliLiter(s) Oral Mucosa every 12 hours  chlorhexidine 2% Cloths 1 Application(s) Topical <User Schedule>  CRRT Treatment    <Continuous>  dexMEDEtomidine Infusion 1 MICROgram(s)/kG/Hr IV Continuous <Continuous>  dextrose 50% Injectable 50 milliLiter(s) IV Push every 15 minutes  DOBUTamine Infusion 2 MICROgram(s)/kG/Min IV Continuous <Continuous>  fluconAZOLE IVPB 400 milliGRAM(s) IV Intermittent every 24 hours  ganciclovir IVPB 120 milliGRAM(s) IV Intermittent every 24 hours  hydrocortisone sodium succinate Injectable 50 milliGRAM(s) IV Push every 12 hours  insulin regular Infusion 1 Unit(s)/Hr IV Continuous <Continuous>  metroNIDAZOLE  IVPB 500 milliGRAM(s) IV Intermittent every 12 hours  norepinephrine Infusion 0.01 MICROgram(s)/kG/Min IV Continuous <Continuous>  pantoprazole  Injectable 40 milliGRAM(s) IV Push every 12 hours  Phoxillum Filtration BK 4 / 2.5 5000 milliLiter(s) CRRT <Continuous>  PrismaSOL Filtration BGK 4 / 2.5 5000 milliLiter(s) CRRT <Continuous>  PrismaSOL Filtration BGK 4 / 2.5 5000 milliLiter(s) CRRT <Continuous>  sodium bicarbonate  Injectable 50 milliEquivalent(s) IV Push <User Schedule>  thiamine Injectable 100 milliGRAM(s) IV Push daily  vasopressin Infusion 0.01 Unit(s)/Min IV Continuous <Continuous>    ALLERGIES: No Known Allergies        SOCIAL HISTORY:  ____________________________________  Social History:  denies tobacco, ETOH or illicit drug use.  FAMILY HISTORY:  Family history of coronary artery disease (Father, Sibling, Sibling)    Family history of diabetes mellitus (Father, Mother)    Family history of coronary artery disease (Sibling)          VITAL SIGNS LAST 24 HOURS:  T(F): 98.6 (12-12 @ 12:00), Max: 99 (12-12 @ 04:00)  HR: 85 (12-12 @ 15:00) (68 - 100)  BP: --  RR: 25 (12-12 @ 15:00) (21 - 41)    PHYSICAL EXAM:     The patient was intubated and sedated  OP showed no ulcerations  There was no visible lymphadenopathy.  Conjunctiva were non injected  The scalp, hair, face, eyebrows, lips, OP, neck, chest, back,   extremities X 4, nails were examined.  There was no hyperhidrosis or bromhidrosis.    Of note on skin exam: significant edema and redness with wet desquamation on the scrotum as well as pitting edema and few scattered erosions on the proximal thighs  ____________________________________    LABS:                        10.2   11.35 )-----------( 22       ( 12 Dec 2024 12:27 )             30.3     12-12    139  |  103  |  38[H]  ----------------------------<  152[H]  4.4   |  17[L]  |  0.91    Ca    9.2      12 Dec 2024 12:27  Phos  2.6     12-12  Mg     2.4     12-12    TPro  4.3[L]  /  Alb  3.6  /  TBili  12.2[H]  /  DBili  x   /  AST  34  /  ALT  66[H]  /  AlkPhos  74  12-12    PT/INR - ( 12 Dec 2024 12:27 )   PT: 22.1 sec;   INR: 1.95 ratio         PTT - ( 12 Dec 2024 12:27 )  PTT:56.7 sec  Urinalysis Basic - ( 12 Dec 2024 12:27 )    Color: x / Appearance: x / SG: x / pH: x  Gluc: 152 mg/dL / Ketone: x  / Bili: x / Urobili: x   Blood: x / Protein: x / Nitrite: x   Leuk Esterase: x / RBC: x / WBC x   Sq Epi: x / Non Sq Epi: x / Bacteria: x     HPI:  73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 Pt underwent Y90 Sept, 2023 with favorable treatment response.Pt completed OLTx evaluation and listed for OLTx 5/03/2024.     Patient is s/p ILD 11/15/24 with course c/b AMS, AHRF, cardiogenic and septic shock, requiring total ectomy with ileostomy 12/7 and 12/9, remains intubated.    Dermatology consulted for scrotal edema and skin breakdown x 1-2 weeks. Per patient family at bedside, patient started to have scrotal and inner thigh swelling about 1-2 weeks ago, but recently the skin has started to break down and is very red, with worsening of the swelling. They deny noting any other rashes on the body and specifically deny oral lesions.        PAST MEDICAL & SURGICAL HISTORY:  Diabetes      Transaminitis      Paroxysmal atrial fibrillation      Depression      BPH (benign prostatic hyperplasia)      Hypertension      Chronic atrial fibrillation      Coronary artery disease      Hepatocellular carcinoma      DM (diabetes mellitus)      HTN (hypertension)      Paroxysmal atrial fibrillation      Cirrhosis      HCC (hepatocellular carcinoma)      History of BPH      History of laparoscopic cholecystectomy      History of lumbar laminectomy      H/O prior ablation treatment      H/O percutaneous left heart catheterization          REVIEW OF SYSTEMS:    CONSTITUTIONAL:  No weakness, fevers or chills  EYES/ENT:  No visual changes;  No vertigo or throat pain   NECK:  No pain or stiffness  RESPIRATORY:  No cough, wheezing, hemoptysis; No shortness of breath  CARDIOVASCULAR:  No chest pain or palpitations  GASTROINTESTINAL:  per HPI  GENITOURINARY:  No dysuria, frequency or hematuria  MUSCULOSKELETAL:  FROM all extremities, normal strength, No calf tenderness  NEUROLOGICAL:  No numbness or weakness  SKIN:  per HPI    MEDICATIONS  (STANDING):  albumin human 25% IVPB 100 milliLiter(s) IV Intermittent every 6 hours  aMIOdarone Infusion 0.5 mG/Min IV Continuous <Continuous>  Calcium Chloride 7 Gram(s),Sodium Chloride 0.9% 600 milliLiter(s) 7 Gram(s) IV Continuous <Continuous>  cefepime   IVPB      cefepime   IVPB 1000 milliGRAM(s) IV Intermittent every 8 hours  chlorhexidine 0.12% Liquid 15 milliLiter(s) Oral Mucosa every 12 hours  chlorhexidine 2% Cloths 1 Application(s) Topical <User Schedule>  CRRT Treatment    <Continuous>  dexMEDEtomidine Infusion 1 MICROgram(s)/kG/Hr IV Continuous <Continuous>  dextrose 50% Injectable 50 milliLiter(s) IV Push every 15 minutes  DOBUTamine Infusion 2 MICROgram(s)/kG/Min IV Continuous <Continuous>  fluconAZOLE IVPB 400 milliGRAM(s) IV Intermittent every 24 hours  ganciclovir IVPB 120 milliGRAM(s) IV Intermittent every 24 hours  hydrocortisone sodium succinate Injectable 50 milliGRAM(s) IV Push every 12 hours  insulin regular Infusion 1 Unit(s)/Hr IV Continuous <Continuous>  metroNIDAZOLE  IVPB 500 milliGRAM(s) IV Intermittent every 12 hours  norepinephrine Infusion 0.01 MICROgram(s)/kG/Min IV Continuous <Continuous>  pantoprazole  Injectable 40 milliGRAM(s) IV Push every 12 hours  Phoxillum Filtration BK 4 / 2.5 5000 milliLiter(s) CRRT <Continuous>  PrismaSOL Filtration BGK 4 / 2.5 5000 milliLiter(s) CRRT <Continuous>  PrismaSOL Filtration BGK 4 / 2.5 5000 milliLiter(s) CRRT <Continuous>  sodium bicarbonate  Injectable 50 milliEquivalent(s) IV Push <User Schedule>  thiamine Injectable 100 milliGRAM(s) IV Push daily  vasopressin Infusion 0.01 Unit(s)/Min IV Continuous <Continuous>    ALLERGIES: No Known Allergies        SOCIAL HISTORY:  ____________________________________  Social History:  denies tobacco, ETOH or illicit drug use.  FAMILY HISTORY:  Family history of coronary artery disease (Father, Sibling, Sibling)    Family history of diabetes mellitus (Father, Mother)    Family history of coronary artery disease (Sibling)          VITAL SIGNS LAST 24 HOURS:  T(F): 98.6 (12-12 @ 12:00), Max: 99 (12-12 @ 04:00)  HR: 85 (12-12 @ 15:00) (68 - 100)  BP: --  RR: 25 (12-12 @ 15:00) (21 - 41)    PHYSICAL EXAM:     The patient was intubated and sedated  OP showed no ulcerations  There was no visible lymphadenopathy.  Conjunctiva were non injected  The scalp, hair, face, eyebrows, lips, OP, neck, chest, back,   extremities X 4, nails were examined.  There was no hyperhidrosis or bromhidrosis.    Of note on skin exam: significant edema and redness with wet desquamation on the scrotum as well as pitting edema and few scattered erosions on the proximal thighs  few tense vesicles, small bullae on the arms  anasarca  unable to examine back as patient intubated  ____________________________________    LABS:                        10.2   11.35 )-----------( 22       ( 12 Dec 2024 12:27 )             30.3     12-12    139  |  103  |  38[H]  ----------------------------<  152[H]  4.4   |  17[L]  |  0.91    Ca    9.2      12 Dec 2024 12:27  Phos  2.6     12-12  Mg     2.4     12-12    TPro  4.3[L]  /  Alb  3.6  /  TBili  12.2[H]  /  DBili  x   /  AST  34  /  ALT  66[H]  /  AlkPhos  74  12-12    PT/INR - ( 12 Dec 2024 12:27 )   PT: 22.1 sec;   INR: 1.95 ratio         PTT - ( 12 Dec 2024 12:27 )  PTT:56.7 sec  Urinalysis Basic - ( 12 Dec 2024 12:27 )    Color: x / Appearance: x / SG: x / pH: x  Gluc: 152 mg/dL / Ketone: x  / Bili: x / Urobili: x   Blood: x / Protein: x / Nitrite: x   Leuk Esterase: x / RBC: x / WBC x   Sq Epi: x / Non Sq Epi: x / Bacteria: x

## 2024-12-12 NOTE — PROGRESS NOTE ADULT - ASSESSMENT
74 year-old with known coronary artery disease as above presents for liver transplant.  Seen to have chest pain post transplant.  It was atypical of angina and has resolved.  Troponin remained negative.    PAF - currently rate controlled with amiodarone.     Patient was septic on Friday, 12/6, and at that time, he was seen to be hyperdynamic with TODD and severe LVOT gradient.  Post ex-lap, patient seen to have newly reduced LVEF.  IABP placed. Inotrope and pressor support. IABP removed 12/10 without significant drop in cardiac output.  Recommend weaning both inotrope and pressors.   Keep MAP >65 mmHg. Monitor output via Benge.    Heart Failure follow-up appreciated.

## 2024-12-12 NOTE — PROGRESS NOTE ADULT - SUBJECTIVE AND OBJECTIVE BOX
Subjective  Patient remains intubated.     Objective    Vital signs  T(F): , Max: 99 (12-12-24 @ 04:00)  HR: 81 (12-12-24 @ 10:30)  BP: --  SpO2: 100% (12-12-24 @ 10:30)  Wt(kg): --    Output     OUT:    Bulb (mL): 1575 mL    Bulb (mL): 1610 mL    Indwelling Catheter - Urethral (mL): 40 mL  Total OUT: 3225 mL    Total NET: -3225 mL      OUT:    Bulb (mL): 160 mL    Bulb (mL): 140 mL    Indwelling Catheter - Urethral (mL): 10 mL  Total OUT: 310 mL    Total NET: -310 mL          Gen: NAD  Abd: soft, nontender, nondistended  : large edematous, erythematous scrotum with significant swelling bilaterally. No crepitus appreciated     Labs      12-12 @ 05:00    WBC 9.64  / Hct 29.5  / SCr 0.97     12-12 @ 00:21    WBC 11.19 / Hct 31.3  / SCr 1.03         Culture - Blood (collected 12-07-24 @ 15:30)  Source: .Blood BLOOD  Preliminary Report (12-11-24 @ 19:01):    No growth at 4 days    Culture - Body Fluid with Gram Stain (collected 12-07-24 @ 11:18)  Source: Body Fluid  Gram Stain (12-07-24 @ 18:22):    polymorphonuclear leukocytes seen    No organisms seen    by cytocentrifuge  Final Report (12-12-24 @ 08:53):    No growth at 5 days    Culture - Blood (collected 12-06-24 @ 13:30)  Source: .Blood BLOOD  Gram Stain (12-07-24 @ 03:44):    Growth in aerobic bottle: Gram Negative Rods    Growth in anaerobic bottle: Gram Negative Rods  Final Report (12-08-24 @ 13:18):    Growth in aerobic and anaerobic bottles: Escherichia coli    See previous culture [id number]    Culture - Blood (collected 12-06-24 @ 13:25)  Source: .Blood BLOOD  Gram Stain (12-07-24 @ 03:07):    Growth in aerobic and anaerobic bottles: Gram Negative Rods  Final Report (12-08-24 @ 13:17):    Growth in aerobic and anaerobic bottles: Escherichia coli    Direct identification is available within approximately 3-5    hours either by Blood Panel Multiplexed PCR or Direct    MALDI-TOF. Details: [email or web address]  Organism: Blood Culture PCR  Escherichia coli (12-08-24 @ 13:17)  Organism: Escherichia coli (12-08-24 @ 13:17)      Method Type: ESAU      -  Ampicillin: R >16 These ampicillin results predict results for amoxicillin      -  Ampicillin/Sulbactam: R >16/8      -  Aztreonam: I 8      -  Cefazolin: R >16      -  Cefepime: S <=2      -  Cefoxitin: R >16      -  Ceftriaxone: S <=1      -  Ciprofloxacin: S <=0.25      -  Ertapenem: S <=0.5      -  Gentamicin: S <=2      -  Imipenem: S <=1      -  Levofloxacin: S <=0.5      -  Meropenem: S <=1      -  Piperacillin/Tazobactam: SDD 16      -  Tobramycin: S <=2      -  Trimethoprim/Sulfamethoxazole: R >2/38  Organism: Blood Culture PCR (12-08-24 @ 13:17)      Method Type: PCR      -  Escherichia coli: Detec    Culture - Blood (collected 12-05-24 @ 14:15)  Source: .Blood BLOOD  Final Report (12-10-24 @ 18:00):    No growth at 5 days    Culture - Blood (collected 12-05-24 @ 14:00)  Source: .Blood BLOOD  Final Report (12-10-24 @ 18:00):    No growth at 5 days        Urine Cx: ?  Blood Cx: ?    Imaging

## 2024-12-12 NOTE — PROGRESS NOTE ADULT - SUBJECTIVE AND OBJECTIVE BOX
Follow Up:  e coli bacteremia    Interval History: lactic acid trending down. afebrile.     REVIEW OF SYSTEMS  [ x ] ROS unobtainable because:  intubated  [  ] All other systems negative except as noted below    Constitutional:  [ ] fever [ ] chills  [ ] weight loss  [ ] weakness  Skin:  [ ] rash [ ] phlebitis	  Eyes: [ ] icterus [ ] pain  [ ] discharge	  ENMT: [ ] sore throat  [ ] thrush [ ] ulcers [ ] exudates  Respiratory: [ ] dyspnea [ ] hemoptysis [ ] cough [ ] sputum	  Cardiovascular:  [ ] chest pain [ ] palpitations [ ] edema	  Gastrointestinal:  [ ] nausea [ ] vomiting [ ] diarrhea [ ] constipation [ ] pain	  Genitourinary:  [ ] dysuria [ ] frequency [ ] hematuria [ ] discharge [ ] flank pain  [ ] incontinence  Musculoskeletal:  [ ] myalgias [ ] arthralgias [ ] arthritis  [ ] back pain  Neurological:  [ ] headache [ ] seizures  [ ] confusion/altered mental status    Allergies  No Known Allergies        ANTIMICROBIALS:  cefepime   IVPB    cefepime   IVPB 1000 every 8 hours  fluconAZOLE IVPB 400 every 24 hours  ganciclovir IVPB 120 every 24 hours  metroNIDAZOLE  IVPB 500 every 12 hours      OTHER MEDS:  MEDICATIONS  (STANDING):  aMIOdarone Infusion 0.5 <Continuous>  dexMEDEtomidine Infusion 1 <Continuous>  dextrose 50% Injectable 50 every 15 minutes  DOBUTamine Infusion 2 <Continuous>  hydrocortisone sodium succinate Injectable 50 every 12 hours  insulin regular Infusion 1 <Continuous>  levalbuterol Inhalation 0.63 every 8 hours PRN  norepinephrine Infusion 0.01 <Continuous>  pantoprazole  Injectable 40 every 12 hours  vasopressin Infusion 0.01 <Continuous>      Vital Signs Last 24 Hrs  T(C): 37 (12 Dec 2024 12:00), Max: 37.2 (12 Dec 2024 04:00)  T(F): 98.6 (12 Dec 2024 12:00), Max: 99 (12 Dec 2024 04:00)  HR: 85 (12 Dec 2024 15:00) (68 - 93)  BP: --  BP(mean): --  RR: 25 (12 Dec 2024 15:00) (21 - 41)  SpO2: 94% (12 Dec 2024 15:00) (93% - 100%)    Parameters below as of 12 Dec 2024 12:00  Patient On (Oxygen Delivery Method): ventilator    O2 Concentration (%): 40    PHYSICAL EXAMINATION:  General: Intubated and Sedated  HEENT: +ETT  Neck: Supple  Cardiac: RRR, No M/R/G  Resp: CTAB, No Wh/Rh/Ra  Abdomen: +ileostomy, dressing over surgical site, NBS, NT/ND, No HSM, No rigidity or guarding  MSK: No LE edema. No Calf tenderness  Skin: No rashes or lesions. Skin is warm and dry to the touch.   Neuro: Intubated and Sedated  : +Testicular and penile edema with erythema. Cool to touch without induration.   Psych: Unable to assess - intubated and sedated                          10.2   11.35 )-----------( 22       ( 12 Dec 2024 12:27 )             30.3       12-12    139  |  103  |  38[H]  ----------------------------<  152[H]  4.4   |  17[L]  |  0.91    Ca    9.2      12 Dec 2024 12:27  Phos  2.6     12-12  Mg     2.4     12-12    TPro  4.3[L]  /  Alb  3.6  /  TBili  12.2[H]  /  DBili  x   /  AST  34  /  ALT  66[H]  /  AlkPhos  74  12-12      Urinalysis Basic - ( 12 Dec 2024 12:27 )    Color: x / Appearance: x / SG: x / pH: x  Gluc: 152 mg/dL / Ketone: x  / Bili: x / Urobili: x   Blood: x / Protein: x / Nitrite: x   Leuk Esterase: x / RBC: x / WBC x   Sq Epi: x / Non Sq Epi: x / Bacteria: x        MICROBIOLOGY:  v  .Blood BLOOD  12-07-24   No growth at 4 days  --  --      Body Fluid  12-07-24   No growth at 5 days  --    polymorphonuclear leukocytes seen  No organisms seen  by cytocentrifuge      .Blood BLOOD  12-06-24   Growth in aerobic and anaerobic bottles: Escherichia coli  See previous culture [id number]  --    Growth in aerobic bottle: Gram Negative Rods  Growth in anaerobic bottle: Gram Negative Rods      .Blood BLOOD  12-06-24   Growth in aerobic and anaerobic bottles: Escherichia coli  Direct identification is available within approximately 3-5  hours either by Blood Panel Multiplexed PCR or Direct  MALDI-TOF. Details: [email or web address]  --  Blood Culture PCR  Escherichia coli      .Blood BLOOD  12-05-24   No growth at 5 days  --  --      .Blood BLOOD  12-05-24   No growth at 5 days  --  --      .Stool  12-01-24   No enteric pathogens isolated.  (Stool culture examined for Salmonella,  Shigella, Campylobacter, Aeromonas, Plesiomonas,  Vibrio, E.coli O157 and Yersinia)  --  --      .Blood BLOOD  11-26-24   No growth at 5 days  --  --      .Blood BLOOD  11-26-24   No growth at 5 days  --  --      Bronchial  11-24-24   Few Candida glabrata  --  Candida (Torulopsis) glabrata      .Blood BLOOD  11-24-24   No growth at 5 days  --  --      .Blood BLOOD  11-24-24   No growth at 5 days  --  --      Combi-Cath  11-23-24   Commensal charity consistent with body site  --    Moderate polymorphonuclear leukocytes per low power field  No squamous epithelial cells per low power field  No organisms seen      .Blood BLOOD  11-22-24   No growth at 5 days  --  --      .Blood BLOOD  11-20-24   No growth at 5 days  --  --      .Blood BLOOD  11-20-24   No growth at 5 days  --  --      Body Fluid  11-15-24   No growth at 5 days  --    No polymorphonuclear leukocytes seen  No organisms seen  by cytocentrifuge        Toxoplasma IgG Screen: 78.00 IU/mL (11-15-24 @ 00:41)  CMV IgG Antibody: 1.50 U/mL (11-15-24 @ 00:41)    CMVPCR Log: NotDetec Wpq00GU/mL (12-11 @ 12:38)  CMVPCR Log: Det <1.54 Assay Dynamic Range: 34.5 to 1.0E+07 IU/mL (1.54 to 7.00 Log10 IU/mL)  Assay lower limit of quantification (LLOQ) is 34.5 IU/mL (1.54 Log10  IU/mL)  CMV DNA detected below the LLOQ will be reported as Detected < 34.5 IU/mL  (<1.54 Log10 IU/mL)  Nydia Cytomegalovirus (CMV) is an FDA-cleared quantitative test that  enables the detection and quantitation of CMV DNA in EDTA plasma of  infected transplant patients on the nydia 8800 system. This test was  verified by [facility]. Results should be interpreted  with consideration of all clinical findings and laboratory findings and  should not form the sole basis for a diagnosis or treatment decision. Owa16XI/mL (12-06 @ 15:31)        RADIOLOGY:    <The imaging below has been reviewed and visualized by me independently. Findings as detailed in report below>    < from: US Doppler Scrotum (12.12.24 @ 11:03) >  IMPRESSION:  1. The testicles demonstrate mildly heterogeneous echotexture likely   reflecting edema. No focal abnormality is identified. Normal   intratesticular arterial flow is demonstrated.  2. Marked, severe thickening and edema of the soft tissues of the scrotal   wall, similar to the prior examination, compatible with cellulitis.    There is a 2.1 x0.9 x 3.2 cm focal, complex fluid collection within the   soft tissues inferior to the right testicle, concerning for evolving   scrotal wall abscess. No other discrete fluid collection is identified.    No gas is identified within the soft tissues. CT could be performed for   further evaluation as clinically warranted.    < end of copied text >

## 2024-12-12 NOTE — PROGRESS NOTE ADULT - ASSESSMENT
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 Pt underwent Y90 Sept, 2023 with favorable treatment response.s/p OLT 11/15/24     on 12/7/24 s/p Total abdominal colectomy with ileostomy  on 12/9 s/p ex-lap with ileostomy.    # Ileus/ischemic bowel  ileus for >1 week- Cdiff neg 12/1  S/p rectal decompression  # severe septic shock 12/6 and pancytopenia, lactic acidosis  # Ecoli bacteremia in context of above  received meropenem, linezolid/caspofungin + tobramycin x1 on 11/6  BC 12/6 positive  s/p colectomy on 12/6- noted dusky appearance- plan to go back to OR  # cardiogenic shock  on dobutamine- s/p impella  # LLL mucus plug  --Continue Cefepime 1g IV Q8H while on CVVHD  --Continue Metronidazole 500 mg IV Q12H through 12/14/24 to complete 7 days of empiric anaerobic coverage  --Fluconazole increased to 400 mg Q24H while on CVVHD  --Continue to follow CBC with diff  --Continue to follow temperature curve  --Follow up on preliminary blood cultures    #Testicular Erythema/Edema  Repeat Testicular US (12/12) 2.1 x0.9 x 3.2 cm focal, complex fluid collection within the soft tissues inferior to the right testicle  --Recommend repeat urology input based on the US findings; consider CT Pelvis    #s/p OLT 11/15/24 CMV D?R? EBV , toxo D?/R?  --Bactrim on hold given thrombocytopenia, unlikely to absorb atovaquone  --Continue Ganciclovir 120 mg IV Q24    #Fever, sepsis, hypoxic respiratory failure s/p on mechanical ventilation  CT C/A/P Bilateral lower lobe consolidation with fluid and debris in the right lower lobe bronchi, concerning for aspiration pneumonia.  CMV PCR (11/24) 146 (low level CMV viremia - not likely contributing)  Adenovirus PCR (11/24) Negative  Cryptococcal Serum Ag (11/24) Negative  serum and bronch aspergillus galactomannan negative  WNV Serology and Serum PCR Negative  CT Chest (11/25) Groundglass opacities in the right lower and left upper lobes, possibly infectious in nature.  CT A/P (11/25) No acute process  s/p Meropenem 1 gram q 8hr (11/23 >11/29)    I will continue to follow. Please feel free to contact me with any further questions.    Kyle Junior M.D.  [facility] Division of Infectious Disease  8AM-5PM Monday - Friday: Available on [employer] Teams  After Hours and Holidays (or if no response on [employer] Teams): Please contact the Infectious Diseases Office at [phone]    The above assessment and plan were discussed with Anne, transplant surgery PA

## 2024-12-12 NOTE — PROGRESS NOTE ADULT - SUBJECTIVE AND OBJECTIVE BOX
HPI:  Patient seen and examined at bedside in CTU.  Rate controlled atrial fibrillation  Weaned off pressor support.  Low dose inotrope.  Lactate improving.    Review Of Systems:  Unable to assess        Medications:  albumin human 25% IVPB 100 milliLiter(s) IV Intermittent every 6 hours  aMIOdarone Infusion 0.5 mG/Min IV Continuous <Continuous>  Calcium Chloride 7 Gram(s),Sodium Chloride 0.9% 600 milliLiter(s) 7 Gram(s) IV Continuous <Continuous>  cefepime   IVPB      cefepime   IVPB 1000 milliGRAM(s) IV Intermittent every 8 hours  chlorhexidine 0.12% Liquid 15 milliLiter(s) Oral Mucosa every 12 hours  chlorhexidine 2% Cloths 1 Application(s) Topical <User Schedule>  CRRT Treatment    <Continuous>  dexMEDEtomidine Infusion 1 MICROgram(s)/kG/Hr IV Continuous <Continuous>  dextrose 50% Injectable 50 milliLiter(s) IV Push every 15 minutes  DOBUTamine Infusion 2 MICROgram(s)/kG/Min IV Continuous <Continuous>  fluconAZOLE IVPB 400 milliGRAM(s) IV Intermittent every 24 hours  ganciclovir IVPB 120 milliGRAM(s) IV Intermittent every 24 hours  hydrocortisone sodium succinate Injectable 50 milliGRAM(s) IV Push every 12 hours  insulin regular Infusion 1 Unit(s)/Hr IV Continuous <Continuous>  levalbuterol Inhalation 0.63 milliGRAM(s) Inhalation every 8 hours PRN  metroNIDAZOLE  IVPB 500 milliGRAM(s) IV Intermittent every 12 hours  norepinephrine Infusion 0.01 MICROgram(s)/kG/Min IV Continuous <Continuous>  oxyCODONE    IR 5 milliGRAM(s) Oral every 4 hours PRN  pantoprazole  Injectable 40 milliGRAM(s) IV Push every 12 hours  Phoxillum Filtration BK 4 / 2.5 5000 milliLiter(s) CRRT <Continuous>  PrismaSOL Filtration BGK 4 / 2.5 5000 milliLiter(s) CRRT <Continuous>  PrismaSOL Filtration BGK 4 / 2.5 5000 milliLiter(s) CRRT <Continuous>  sodium bicarbonate  Injectable 50 milliEquivalent(s) IV Push <User Schedule>  thiamine Injectable 100 milliGRAM(s) IV Push daily  vasopressin Infusion 0.01 Unit(s)/Min IV Continuous <Continuous>    PAST MEDICAL & SURGICAL HISTORY:  Diabetes      Transaminitis      Paroxysmal atrial fibrillation      Depression      BPH (benign prostatic hyperplasia)      Hypertension      Chronic atrial fibrillation      Coronary artery disease      Hepatocellular carcinoma      DM (diabetes mellitus)      HTN (hypertension)      Paroxysmal atrial fibrillation      Cirrhosis      HCC (hepatocellular carcinoma)      History of BPH      History of laparoscopic cholecystectomy      History of lumbar laminectomy      H/O prior ablation treatment      H/O percutaneous left heart catheterization        Vitals:  T(C): 36.8 (12-13-24 @ 00:00), Max: 37.2 (12-12-24 @ 04:00)  HR: 80 (12-13-24 @ 01:00) (68 - 90)  BP: --  BP(mean): --  RR: 18 (12-13-24 @ 01:00) (18 - 41)  SpO2: 97% (12-13-24 @ 01:00) (94% - 100%)  Wt(kg): --  Daily     Daily   I&O's Summary    11 Dec 2024 07:01  -  12 Dec 2024 07:00  --------------------------------------------------------  IN: 4231.8 mL / OUT: 8012 mL / NET: -3780.2 mL    12 Dec 2024 07:01  -  13 Dec 2024 01:08  --------------------------------------------------------  IN: 2242.5 mL / OUT: 4548 mL / NET: -2305.5 mL        Physical Exam:  Appearance: Intubated, critically ill   Eyes: PERRL, EOMI, pink conjunctiva  HENT: +ET tube, +NG tube  Cardiovascular: RRR, S1, S2, no murmurs, rubs, or gallops; no edema; no JVD  Respiratory: ventilator support  Gastrointestinal: soft, non-tender, non-distended with normal bowel sounds  Musculoskeletal: No clubbing; no joint deformity                           10.4   9.62  )-----------( 13       ( 13 Dec 2024 00:37 )             30.4     12-12    139  |  103  |  38[H]  ----------------------------<  152[H]  4.4   |  17[L]  |  0.91    Ca    9.2      12 Dec 2024 12:27  Phos  2.6     12-12  Mg     2.4     12-12    TPro  4.3[L]  /  Alb  3.6  /  TBili  12.2[H]  /  DBili  x   /  AST  34  /  ALT  66[H]  /  AlkPhos  74  12-12    PT/INR - ( 12 Dec 2024 12:27 )   PT: 22.1 sec;   INR: 1.95 ratio         PTT - ( 12 Dec 2024 12:27 )  PTT:56.7 sec        Cardiovascular Diagnostic Testing:  ECG: sinus rhythm    Echo: < from: Intra-Operative Transesophageal Echo W or WO Ultrasound Enhancing Agent (11.15.24 @ 07:46) >  --------------------------------------------------------------------------------  PRE-BYPASS FINDINGS     Left Ventricle:  Left ventricular ejection fraction is estimated at 60 to 65%. Normal left ventricularwall thickness. The left ventricular systolic function is normal There are no regional wall motion abnormalities seen.     Right Ventricle:  The right ventricular cavity is normal in size, with normal wall thickness and right ventricular systolic function is normal. Right ventricular systolic function is normal.     Left Atrium:  The left atrium is normal in size. No thrombus visualized in left atrial appendage.     Right Atrium:  The right atrium is normal in size. The right atrium is normal in size.     Interatrial Septum:  No PFO visualized with color flow doppler.     Aortic Valve:  The aortic valve appears trileaflet. There is no aortic valve stenosis. There is trace aortic regurgitation.     Mitral Valve:  There is no mitral valve stenosis. There is no mitral valve stenosis. There is trace mitral regurgitation.     Tricuspid Valve:  There is no evidence of tricuspid stenosis. There is trace tricuspid regurgitation.     Pericardium:  No pericardial effusion seen.     Post-Bypass:  S/P OLT. Under current loading conditions, hyperdynamic left ventricular systolic function, EF: 70-75% by visual estimate, no RWMA. Normal right ventricular systolic function. All other findings unchanged. Probe removed atraumatically, no blood. The patient tolerated the procedure well and without complications. Permanent recorded images are stored in the medical record.     Electronically signed by James Villareal on 11/15/2024 at 2:18:16 PM         *** Final ***    < end of copied text >      < from: TTE Limited W or WO Ultrasound Enhancing Agent (12.11.24 @ 09:28) >  _______________________________________________________________________________________     CONCLUSIONS:      1. Endocardial visualization enhanced with Definity (Ultrasound Enhancing Agent).   2. Left ventricular systolic function is normal with an ejection fraction visually estimated at 55 to 60 %.   3. Enlarged right ventricular cavity size and mildly reduced right ventricular systolic function.   4. Device lead is visualized in the right heart.    ________________________________________________________________________________________    < end of copied text >      Stress Testing:     Cath: 4/24/2024, patient had his LHC repeated with Ben Villareal MD at [facility].  Cath showed multivessel coronary artery disease, but the lesions previously noted were FFR negative.  He continues to have MIRTA 3 flow throughout.    Interpretation of Telemetry: Sinus     Imaging:

## 2024-12-12 NOTE — PROGRESS NOTE ADULT - NS ATTEND AMEND GEN_ALL_CORE FT
POD#3 s/p creation ileostomy and closure of abdomen; POD# 27 s/p OLT  Transaminases continue to improve, TBili plateaued at 10  lactate down to 4  remains intubated on low dose dobutamine and amiodarone. Aortic balloon pump removed  echo showing improvement in EF. cont to monitor  Immunosuppression - Prograf and cellcept held. Cont stress dose hydrocortisone  Cont trickle feeds via NGT  sepsis improving but remains critically ill. cont abx as per ID, f/u cultures and pathology.  Cont VAC over abdominal wound. Cont albumin replacement for large volume ascites drainage.  Ca and bicarb replacement POD#3 s/p creation ileostomy and closure of abdomen; POD# 27 s/p OLT  Transaminases continue to improve, TBili plateaued at 10  lactate down to 4  remains intubated on low dose dobutamine and amiodarone. Aortic balloon pump removed  echo showing improvement in EF. cont to monitor  Immunosuppression - Prograf and cellcept held. Cont stress dose hydrocortisone  Cont trickle feeds via NGT  sepsis improving but remains critically ill. cont abx as per ID, f/u cultures and pathology.  Cont VAC over abdominal wound. Cont albumin replacement for large volume ascites drainage.  Ca and bicarb replacement  Cont CVVH as per nephrology. remains oliguric

## 2024-12-12 NOTE — PROGRESS NOTE ADULT - ATTENDING COMMENTS
seen and examined  spoke w/ patient's son (MD - vascular surgeon) and wife  scrotal exam largely unchanged but still impressive erythema and swelling. Suspect related to dependent area of body w/ patient's anasarca / abdominal ascites tracking to scrotum.   Repeat US today called "suspicion for evolving abscess" but appears more similar to soft-tissue edema on my read. Discussed w/ patient's son who also favors NON operative (I+D) management - there is no drainable collection and suspect incising scrotum would be of no benefit (will notcorrect his anasarca / dependency and would be at risk for poor healing).  Did recommend dermatology consult as primary issue appears to be related to scrotal skin  recommend repeat US in 48 hours  we will continue to follow

## 2024-12-13 NOTE — PROGRESS NOTE ADULT - SUBJECTIVE AND OBJECTIVE BOX
Subjective  Remains intubated    Objective    Vital signs  T(F): , Max: 99 (12-12-24 @ 16:00)  HR: 76 (12-13-24 @ 06:00)  BP: --  SpO2: 96% (12-13-24 @ 06:00)  Wt(kg): --    Output     OUT:    Bulb (mL): 1575 mL    Bulb (mL): 1610 mL    Indwelling Catheter - Urethral (mL): 40 mL  Total OUT: 3225 mL    Total NET: -3225 mL      OUT:    Bulb (mL): 1680 mL    Bulb (mL): 1000 mL    Indwelling Catheter - Urethral (mL): 40 mL  Total OUT: 2720 mL    Total NET: -2720 mL          Gen: NAD  : markedly edematous scrotum with erythema of the skin 2/2 breakdown of the epidermis    Labs      12-13 @ 00:38    WBC --    / Hct --    / SCr 0.89     12-13 @ 00:37    WBC 9.62  / Hct 30.4  / SCr --           Culture - Blood (collected 12-07-24 @ 15:30)  Source: .Blood BLOOD  Final Report (12-12-24 @ 19:00):    No growth at 5 days    Culture - Body Fluid with Gram Stain (collected 12-07-24 @ 11:18)  Source: Body Fluid  Gram Stain (12-07-24 @ 18:22):    polymorphonuclear leukocytes seen    No organisms seen    by cytocentrifuge  Final Report (12-12-24 @ 08:53):    No growth at 5 days    Culture - Blood (collected 12-06-24 @ 13:30)  Source: .Blood BLOOD  Gram Stain (12-07-24 @ 03:44):    Growth in aerobic bottle: Gram Negative Rods    Growth in anaerobic bottle: Gram Negative Rods  Final Report (12-08-24 @ 13:18):    Growth in aerobic and anaerobic bottles: Escherichia coli    See previous culture [id number]    Culture - Blood (collected 12-06-24 @ 13:25)  Source: .Blood BLOOD  Gram Stain (12-07-24 @ 03:07):    Growth in aerobic and anaerobic bottles: Gram Negative Rods  Final Report (12-08-24 @ 13:17):    Growth in aerobic and anaerobic bottles: Escherichia coli    Direct identification is available within approximately 3-5    hours either by Blood Panel Multiplexed PCR or Direct    MALDI-TOF. Details: [email or web address]  Organism: Blood Culture PCR  Escherichia coli (12-08-24 @ 13:17)  Organism: Escherichia coli (12-08-24 @ 13:17)      Method Type: ESAU      -  Ampicillin: R >16 These ampicillin results predict results for amoxicillin      -  Ampicillin/Sulbactam: R >16/8      -  Aztreonam: I 8      -  Cefazolin: R >16      -  Cefepime: S <=2      -  Cefoxitin: R >16      -  Ceftriaxone: S <=1      -  Ciprofloxacin: S <=0.25      -  Ertapenem: S <=0.5      -  Gentamicin: S <=2      -  Imipenem: S <=1      -  Levofloxacin: S <=0.5      -  Meropenem: S <=1      -  Piperacillin/Tazobactam: SDD 16      -  Tobramycin: S <=2      -  Trimethoprim/Sulfamethoxazole: R >2/38  Organism: Blood Culture PCR (12-08-24 @ 13:17)      Method Type: PCR      -  Escherichia coli: Detec        Urine Cx: ?  Blood Cx: ?    Imaging

## 2024-12-13 NOTE — PROGRESS NOTE ADULT - SUBJECTIVE AND OBJECTIVE BOX
[facility] DIVISION OF KIDNEY DISEASES AND HYPERTENSION -- FOLLOW UP NOTE  --------------------------------------------------------------------------------  Chief Complaint:  s/p OLT 11/15/24 with oliguric MORAIMA in setting of cardiogenic/septic shock.    24 hour events/subjective: Pt. seen and examined in CTU earlier today. Pt. remains intubated and sedated. Pt. continues to receive CRRT. Pt. is on dobutamine gtt.       PAST HISTORY  --------------------------------------------------------------------------------  No significant changes to PMH, PSH, FHx, SHx, unless otherwise noted    ALLERGIES & MEDICATIONS  --------------------------------------------------------------------------------  Allergies    No Known Allergies    Intolerances      Standing Inpatient Medications  aMIOdarone Infusion 0.5 mG/Min IV Continuous <Continuous>  cefepime   IVPB      cefepime   IVPB 1000 milliGRAM(s) IV Intermittent every 8 hours  chlorhexidine 0.12% Liquid 15 milliLiter(s) Oral Mucosa every 12 hours  chlorhexidine 2% Cloths 1 Application(s) Topical <User Schedule>  CRRT Treatment    <Continuous>  DOBUTamine Infusion 2 MICROgram(s)/kG/Min IV Continuous <Continuous>  fluconAZOLE IVPB 400 milliGRAM(s) IV Intermittent every 24 hours  ganciclovir IVPB 120 milliGRAM(s) IV Intermittent every 24 hours  hydrocortisone sodium succinate Injectable 50 milliGRAM(s) IV Push every 12 hours  insulin regular Infusion 1 Unit(s)/Hr IV Continuous <Continuous>  metroNIDAZOLE  IVPB 500 milliGRAM(s) IV Intermittent every 12 hours  pantoprazole  Injectable 40 milliGRAM(s) IV Push every 12 hours  Phoxillum Filtration BK 4 / 2.5 5000 milliLiter(s) CRRT <Continuous>  PrismaSOL Filtration BGK 4 / 2.5 5000 milliLiter(s) CRRT <Continuous>  PrismaSOL Filtration BGK 4 / 2.5 5000 milliLiter(s) CRRT <Continuous>  thiamine Injectable 100 milliGRAM(s) IV Push daily    PRN Inpatient Medications  oxyCODONE    IR 5 milliGRAM(s) Oral every 4 hours PRN      REVIEW OF SYSTEMS  --------------------------------------------------------------------------------    All other systems were reviewed and are negative, except as noted.    VITALS/PHYSICAL EXAM  --------------------------------------------------------------------------------  T(C): 36.8 (12-13-24 @ 11:00), Max: 37.2 (12-12-24 @ 16:00)  HR: 100 (12-13-24 @ 12:02) (76 - 100)  BP: --  RR: 17 (12-13-24 @ 11:00) (17 - 38)  SpO2: 97% (12-13-24 @ 12:02) (94% - 100%)  Wt(kg): --        12-12-24 @ 07:01  -  12-13-24 @ 07:00  --------------------------------------------------------  IN: 3203.9 mL / OUT: 6078 mL / NET: -2874.1 mL    12-13-24 @ 07:01  -  12-13-24 @ 12:53  --------------------------------------------------------  IN: 742.6 mL / OUT: 1103 mL / NET: -360.4 mL      Physical Exam:  Gen: critically ill, intubated and sedated.   Pulm: intubated.   CV: tachycardic, S1S2+  Abd: + distended. +incisions. +NGT in place. +ostomy   : +nichols catheter   MSK: 2+ pitting above knee including scrotum  Skin: Warm  Access: R IJ non-tunneled HD catheter being used for CRRT    LABS/STUDIES  --------------------------------------------------------------------------------              10.4   9.62  >-----------<  13       [12-13-24 @ 00:37]              30.4     139  |  106  |  38  ----------------------------<  137      [12-13-24 @ 00:38]  4.2   |  20  |  0.89        Ca     9.3     [12-13-24 @ 00:38]      Mg     2.4     [12-13-24 @ 00:38]      Phos  2.7     [12-13-24 @ 00:38]    TPro  4.1  /  Alb  3.6  /  TBili  14.0  /  DBili  x   /  AST  41  /  ALT  56  /  AlkPhos  80  [12-13-24 @ 00:38]    PT/INR: PT 23.6 , INR 2.09       [12-13-24 @ 00:37]  PTT: 60.7       [12-13-24 @ 00:37]      Creatinine Trend:  SCr 0.89 [12-13 @ 00:38]  SCr 0.91 [12-12 @ 12:27]  SCr 0.97 [12-12 @ 05:00]  SCr 1.03 [12-12 @ 00:21]  SCr 1.09 [12-11 @ 20:33]    Vitamin D (25OH) <6.0      [11-21-24 @ 08:32]  TSH 0.68      [12-12-24 @ 12:27]  Lipid: chol 114, , HDL 47, LDL --      [04-24-24 @ 06:48]    HBsAb 41.9      [11-15-24 @ 00:41]  HBsAg Nonreact      [11-15-24 @ 00:41]  HBcAb Nonreact      [11-15-24 @ 00:41]  HCV 0.06, Nonreact      [11-15-24 @ 00:41]  HIV Nonreact      [11-15-24 @ 00:41]

## 2024-12-13 NOTE — CONSULT NOTE ADULT - ASSESSMENT
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 Patient underwent OLT on 11/15/2024 with complicated post-op course including E.coli bacteremia (12/6), cardiogenic shock requiring IAPB on 12/7, colectomy on 12/7 with RTOR for closure and ileostomy creation. IAPB was removed on 12/10. Hematology was consulted for thrombocytopenia.    CBC (12/13/2024): WBC 9.62, Hgb 10.4, MCV 82 Plt 13  Coags (12/13/2024): Fibrinogen 95, PT 24, PTT 60.7, INR 2.09   Organ function: Cr 0.89, Bili 14, AST 41, ALT 56       # Thrombocytopenia  - Review of plt trend shows borderline low plt on admission (60-100s) consistent with liver disease. Plt improved post-OLT but began to decline on 12/7/2024. Timeline coincides with his acute decompensation including bacteremia, septic and cardiogenic shock requiring IAPB.   - Peripheral blood cytopenias are usually observed following hypotensive episodes (both septic shock and non-septic shock. While all cell lines can be affected, thrombocytopenia is most commonly seen. The degree of thrombocytopenia usually correlates with the severity and duration of hypotension. It is a well known phenomenon that shock results in tissue hypoperfusion and hypoxia leading to organ failure (kidney, liver, lung etc.). The bone marrow is an organ and as such the same phenomenon occurs. Hypoperfusion and  hypoxia result in injury to hematopoietic progenitor cells; leading to cytopenia.   https://pubmed.ncbi.nlm.nih.gov/30913883/    - In addition, patient was on IABP. The high shear forces during IABP pumping action can cause platelet destruction and contribute to thrombocytopenia.   - Expect continued decline in platelets while hypotension is ongoing. If hypotension remains unresolved, will eventually start seeing decrease in Hgb and WBC. It is imperative to note that upon resolution of hypotension, cytopenias will persist until bone marrow starts to recover, as such, do not anticipate immediate improvement in counts after BP normalizes.     # Coagulopathy   - Coagulopathy on presentation due to liver disease, which resolved with OLT but again since 12/6, has been worsening. Together with worsening of his Tbili and liver markers, reflects ongoing due to liver injury.       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 Patient underwent OLT on 11/15/2024 with complicated post-op course including E.coli bacteremia (12/6), cardiogenic shock requiring IAPB on 12/7, colectomy on 12/7 with RTOR for closure and ileostomy creation. IAPB was removed on 12/10. Hematology was consulted for thrombocytopenia.    CBC (12/13/2024): WBC 9.62, Hgb 10.4, MCV 82 Plt 13  Coags (12/13/2024): Fibrinogen 95, PT 24, PTT 60.7, INR 2.09   Organ function: Cr 0.89, Bili 14, AST 41, ALT 56       # Thrombocytopenia  - Review of plt trend shows borderline low plt on admission (60-100s) consistent with liver disease. Plt improved post-OLT but began to decline on 12/7/2024. Timeline coincides with his acute decompensation including bacteremia, septic and cardiogenic shock requiring IAPB.   - Peripheral blood cytopenias are usually observed following hypotensive episodes (both septic shock and non-septic shock. While all cell lines can be affected, thrombocytopenia is most commonly seen. The degree of thrombocytopenia usually correlates with the severity and duration of hypotension. It is a well known phenomenon that shock results in tissue hypoperfusion and hypoxia leading to organ failure (kidney, liver, lung etc.). The bone marrow is an organ and as such the same phenomenon occurs. Hypoperfusion and  hypoxia result in injury to hematopoietic progenitor cells; leading to cytopenia.   https://pubmed.ncbi.nlm.nih.gov/63888847/    - In addition, patient was on IABP. The high shear forces during IABP pumping action can cause platelet destruction and contribute to thrombocytopenia.   - Expect continued decline in platelets while hypotension is ongoing. If hypotension remains unresolved, will eventually start seeing decrease in Hgb and WBC. It is imperative to note that upon resolution of hypotension, cytopenias will persist until bone marrow starts to recover, as such, do not anticipate immediate improvement in counts after BP normalizes.     # Coagulopathy   - Coagulopathy on presentation due to liver disease, which resolved with OLT but again since 12/6, has been worsening. Together with worsening of his Tbili and liver markers and decreased fibrinogen, reflects liver injury and impaired synthetic function     Recommendations:   - Peripheral smear reviewed shows   - No role for PLEX or IVIG given etiology of thrombocytopenia  is not immune mediated or microangiopathic in nature  - Transfuse for platelets  <10k ( or <15k if febrile or <50k if non-CNS bleeding)   - Discussed with son at bedside       Jocy Nguyen MD MS  Hematology/Oncology Fellow PGY-4  [employer] at [facility] | [facility] | [facility]  Available on Teams    74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 Patient underwent OLT on 11/15/2024 with complicated post-op course including E.coli bacteremia (12/6), cardiogenic shock requiring IAPB on 12/7, colectomy on 12/7 with RTOR for closure and ileostomy creation. IAPB was removed on 12/10. Hematology was consulted for thrombocytopenia.    CBC (12/13/2024): WBC 9.62, Hgb 10.4, MCV 82 Plt 13  Coags (12/13/2024): Fibrinogen 95, PT 24, PTT 60.7, INR 2.09   Organ function: Cr 0.89, Bili 14, AST 41, ALT 56       # Thrombocytopenia  - Review of plt trend shows borderline low plt on admission (60-100s) consistent with liver disease. Plt improved post-OLT but began to decline on 12/7/2024. Timeline coincides with his acute decompensation including bacteremia, septic and cardiogenic shock requiring IAPB.   - Peripheral blood cytopenias are usually observed following hypotensive episodes (both septic shock and non-septic shock. While all cell lines can be affected, thrombocytopenia is most commonly seen. The degree of thrombocytopenia usually correlates with the severity and duration of hypotension. It is a well known phenomenon that shock results in tissue hypoperfusion and hypoxia leading to organ failure (kidney, liver, lung etc.). The bone marrow is an organ and as such the same phenomenon occurs. Hypoperfusion and  hypoxia result in injury to hematopoietic progenitor cells; leading to cytopenia.   https://pubmed.ncbi.nlm.nih.gov/20565815/    - In addition, patient was on IABP. The high shear forces during IABP pumping action can cause platelet destruction and contribute to thrombocytopenia.   - Expect continued decline in platelets while hypotension is ongoing. If hypotension remains unresolved, will eventually start seeing decrease in Hgb and WBC. It is imperative to note that upon resolution of hypotension, cytopenias will persist until bone marrow starts to recover, as such, do not anticipate immediate improvement in counts after BP normalizes.     # Coagulopathy   - Coagulopathy on presentation due to liver disease, which resolved with OLT but again since 12/6, has been worsening. Together with worsening of his Tbili and liver markers and decreased fibrinogen, reflects liver injury and impaired synthetic function     Recommendations:   - Peripheral smear reviewed showed normochromic, target cells (consistent with liver disease), 0-1 schistocytes (not consistent with DIC), 1-2 plt per HPF   - No role for PLEX or IVIG given etiology of thrombocytopenia  is not immune mediated or microangiopathic in nature  - Etiology of thrombocytopenia is multifactorial as described above: sepsis, shock, IABP   - Transfuse for platelets  <10k ( or <15k if febrile or <50k if non-CNS bleeding)   - Discussed with son at bedside       Jocy Nguyen MD MS  Hematology/Oncology Fellow PGY-4  [employer] at [facility] | [facility] | [facility]  Available on Teams    74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 Patient underwent OLT on 11/15/2024 with complicated post-op course including E.coli bacteremia (12/6), cardiogenic shock requiring IAPB on 12/7, colectomy on 12/7 with RTOR for closure and ileostomy creation. IAPB was removed on 12/10. Hematology was consulted for thrombocytopenia.    CBC (12/13/2024): WBC 9.62, Hgb 10.4, MCV 82 Plt 13  Coags (12/13/2024): Fibrinogen 95, PT 24, PTT 60.7, INR 2.09   Organ function: Cr 0.89, Bili 14, AST 41, ALT 56       # Thrombocytopenia  - Review of plt trend shows borderline low plt on admission (60-100s) consistent with liver disease. Plt improved post-OLT but began to decline on 12/7/2024. Timeline coincides with his acute decompensation including bacteremia, septic and cardiogenic shock requiring IAPB.   - Peripheral blood cytopenias are usually observed following hypotensive episodes (both septic shock and non-septic shock. While all cell lines can be affected, thrombocytopenia is most commonly seen. The degree of thrombocytopenia usually correlates with the severity and duration of hypotension. It is a well known phenomenon that shock results in tissue hypoperfusion and hypoxia leading to organ failure (kidney, liver, lung etc.). The bone marrow is an organ and as such the same phenomenon occurs. Hypoperfusion and  hypoxia result in injury to hematopoietic progenitor cells; leading to cytopenia.   https://pubmed.ncbi.nlm.nih.gov/73709860/    - In addition, patient was on IABP. The high shear forces during IABP pumping action can cause platelet destruction and contribute to thrombocytopenia.   - Expect continued decline in platelets while hypotension is ongoing. If hypotension remains unresolved, will eventually start seeing decrease in Hgb and WBC. It is imperative to note that upon resolution of hypotension, cytopenias will persist until bone marrow starts to recover, as such, do not anticipate immediate improvement in counts after BP normalizes.     # Coagulopathy   - Coagulopathy on presentation due to liver disease, which resolved with OLT but again since 12/6, has been worsening. Together with worsening of his Tbili and liver markers and decreased fibrinogen, reflects liver injury and impaired synthetic function     Recommendations:   - Peripheral smear reviewed showed normochromic, target cells (consistent with liver disease), 0-1 schistocytes (not consistent with DIC), 1-2 plt per HPF   - No role for PLEX or IVIG given etiology of thrombocytopenia  is not immune mediated or microangiopathic in nature  - Etiology of thrombocytopenia is multifactorial as described above: sepsis, shock, IABP   - Transfuse for platelets  <10k ( or <15k if febrile or <50k if non-CNS bleeding)   - Discussed with son at bedside     Hematology will sign off. Please reach out if additional questions or concerns.     Jocy Nguyen MD MS  Hematology/Oncology Fellow PGY-4  [employer] [facility] [employer] at [facility] | [facility] | [facility]  Available on Teams

## 2024-12-13 NOTE — ADVANCED PRACTICE NURSE CONSULT - ASSESSMENT
Chart reviewed & events noted. In at bedside with PT wound care Sharon Morgan for routine vac dsg & complete ostomy pouching system changes. (pls see separate notes).Pt in bed sedated /vented, no family member at bedside.  Complete pouching system changed. Stoma 1 5/8" red & viable, scant liquid bilious effluent noted in pouch. Some bleeding noted at mucocutaneous junction controlled by gentle pressure. Peristomal skin & mucocutaneous junction intact. + creases/skin indents at 3&9 o'clock. Repouched w/ 2 1/4" flat skin barrier, bead of stoma paste applied to skin creases to level surface & at the back of skin barrier near opening to caulk & drainable pouch. Supplies with pattern left at bedside.

## 2024-12-13 NOTE — ADVANCED PRACTICE NURSE CONSULT - RECOMMEDATIONS
Will recommend:  1. Monitor output.  2. Empty pouch when 1/3-1/2 full   3. Change pouching system every 3-4 days & prn leakage  4. Contact ostomy specialists if questions, concerns/issues .  5. Supplies: Aguirre 2 1/4" Ceraplus flat skin barrier (#09558), Lianne 2 1/4" drainable pouch (#[id number]); Accessory products:  stoma paste #[id number], stoma powder (#4434) & Cavilon No sting barrier film wipe (#1192)  Will f/u for ostomy education when appropriate. Pt is currently sedated/ vented.

## 2024-12-13 NOTE — PROGRESS NOTE ADULT - ASSESSMENT
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 Post-op course c/b worsening mental status and acute hypoxic respiratory failure requiring multiple intubations/extubations, currently intubated (as of 12/7/2024) and cardiogenic shock s/p Percutaneous insertion of an intra-aortic balloon pump and septic shock/colonic ischemia s/p total abdominal colectomy with ileostomy on 12/7/24,  s/p ex-lap w/ileostomy on 12/9/24. Urology consulted for scrotal edema w/ large skin breakdown. The symptom start one week ago and getting worse recent two days with large area skin breakdown and redness on the whole scrotal area, as well the bilateral groin and internal thigh. Castro in place with clear yellowish colored urine.   In CTU, pt WBC 7, H/H 10/32, Cr 1.29, Lactate 8.7. blood culture on 12/6 growth E. coli, and no growth on 12/7. Patient off pressor currently.    Scrotal edema & skin breakdown possibly 2/2 intra-abdominal fluid tracking vs third-spacing from cardiogenic shock    Recommendations:   - No acute urologic intervention indicated   - Scrotal elevation  - Serial examination of scrotum  - Ultrasound scrotum -> US reviewed and consistent with significant with soft tissue edema. US notes that no arterial flow visualized to testicles bilaterally which is likely 2/2 artifact from significant edema. Low concern for testicular ischemia given that when R testicle is palpated it is soft. Would recommend repeating US tomorrow to see if collection still visualized/getting larger. US reviewed and do not believe there is a discrete collection in the scrotum likely represents complex fluid/hydrocele which is likely from abdominal products (blood, fluid) tracking to dependent portion of scrotum  - Wound care consult, appreciate derm recs  - No surgical intervention indicated, local wound care  - Rest of care per primary team  - Discussed with Dr. Collier    Plan discussed w/ Dr. Benito Collier and Dr. Berry  Urology

## 2024-12-13 NOTE — PROGRESS NOTE ADULT - ASSESSMENT
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 Post-op course c/b worsening mental status and acute hypoxic respiratory failure requiring multiple intubations/extubations, currently extubated (as of 11/26/24) and colonic ileus s/p several rounds of Relistor and Neostigmine with NGT and rectal tube currently in place now with septic shock of unclear etiology. Transplant nephrology consulted for metabolic acidosis and MORAIMA.    1. s/p OLT 11/15/24 with oliguric MORAIMA in setting of septic shock.  - Likely hemodynamically mediated in setting of cardiogenic and septic shock on multiple vasopressors and IABP.   - SCr on admission was 0.48 11/15/24  - Pt. oliguric, UOP today is 0 cc.   - Urinalysis trace ketones, protein 30, 17 casts.   - CT AP non-con: Bilateral renal cysts including cysts with peripheral calcification in the left kidney.  - Pt. initiated on CRRT 12/7/24-.  - Continue with CRRT at this time, pt. appears to be tolerating well.   - BHB elevated at 2.3 12/12/24. Would repeat BHB level as well today.  - IS per transplant hepatology team   - Monitor labs and BPs. Avoid nephrotoxins including, ACE/ARB, NSAIDs, contrast, etc.     2. Metabolic Acidosis   - AGMA in setting of septic shock, starvation ketosis, lactic acidosis and MORAIMA.  - pH 7.30, pCO2 23. SCO2 10. on 12/6/24.   - Lactate elevated at 8.2 now improved to 2.1 today (12/13/24)  - BHB is 2.3, repeat BHB.    - Can continue bicarbonate pushes QID.     If you have any questions, please feel free to contact me:  Magaly Tello MD PGY-4  Nephrology Fellow  Pager [phone] / [employer] Teams (Preferred)  (After 5pm or on weekends please page the on-call fellow)

## 2024-12-13 NOTE — PROGRESS NOTE ADULT - ASSESSMENT
Neuro:  - AOx  - pain control w/ oxycodone  - CT head 11/19, 11/21, 11/25, 11/29, 12/5 negative  - EEG: Mild diffuse cerebral dysfunction that is not specific in etiology. No epileptic discharges recorded. No seizures recorded.  - Holding home xanax, Abilify, Zoloft, Remeron, Lexapro     Resp:  -PRVC 14/500/5/30  -SBT daily  -c/w inhaled bronchodilators  -VBG daily    CV:  -dobutamine gtt  -amiodarone gtt  -trend lactate  -hold home metoprolol  -TTE 12/11 shows EF of 55-60%    GI:  -trend LFTs  -NPO w/ tube feeds  -protonix QD  -high output from ALYSSA drains, continue to monitor quantity & quality    /Renal:  -c/w CRRT goal net -100  -monitor BUN/Cr  -I&Os, trend UOP  -elevate scrotum i/s/o edema   -repeat scrotal US tomorrow as per urology    Heme:  -trend H/H  -monitor coags    ID:  -ABx w/ cefepime & metronidazole   -monitor WBC, fever curve  -transplant ppx w/ ganciclovir, fluconazole    Endo:  -hydrocortisone     Lines:   -NGT x 2  -nichols  -R IJ (12/7)  -L IJ (12/7)  -ALYSSA x 2 Neuro:  - Follows commands, off sedation  - pain control w/ oxycodone  - CT head 11/19, 11/21, 11/25, 11/29, 12/5 negative  - EEG: Mild diffuse cerebral dysfunction that is not specific in etiology. No epileptic discharges recorded. No seizures recorded.  - Holding home xanax, Abilify, Zoloft, Remeron, Lexapro     Resp:  -PRVC 14/500/5/30  -SBT daily  -c/w inhaled bronchodilators  -VBG daily    CV:  -dobutamine gtt  -amiodarone gtt  -trend lactate  -hold home metoprolol  -TTE 12/11 shows EF of 55-60%    GI:  -trend LFTs  -NPO w/ tube feeds  -protonix QD  -high output from ALYSSA drains, continue to monitor quantity & quality    /Renal:  -c/w CRRT goal net -100  -monitor BUN/Cr  -I&Os, trend UOP  -elevate scrotum i/s/o edema   -repeat scrotal US tomorrow as per urology    Heme:  -trend H/H  -monitor coags    ID:  -ABx w/ cefepime & metronidazole   -monitor WBC, fever curve  -transplant ppx w/ ganciclovir, fluconazole    Endo:  -hydrocortisone     Lines:   -NGT x 2  -nichols  -R IJ (12/7)  -L IJ (12/7)  -ALYSSA x 2 Neuro:  - Follows commands, off sedation  - pain control w/ oxycodone  - CT head 11/19, 11/21, 11/25, 11/29, 12/5 negative  - EEG: Mild diffuse cerebral dysfunction that is not specific in etiology. No epileptic discharges recorded. No seizures recorded.  - Holding home xanax, Abilify, Zoloft, Remeron, Lexapro     Resp:  -PRVC 14/500/5/30  -SBT daily  -c/w inhaled bronchodilators  -VBG daily    CV:  -dobutamine gtt  -amiodarone gtt  -trend lactate  -hold home metoprolol  -TTE 12/11 shows EF of 55-60%    GI:  -trend LFTs  -NPO w/ tube feeds  -protonix QD  -high output from ALYSSA drains, continue to monitor quantity & quality    /Renal:  -c/w CRRT goal net -100  -monitor BUN/Cr  -I&Os, trend UOP  -elevate scrotum i/s/o edema   -repeat scrotal US tomorrow as per urology    Heme:  -trend H/H  -monitor coags    ID:  -ABx w/ cefepime & metronidazole   -monitor WBC, fever curve  -ganciclovir, fluconazole    Endo:  -hydrocortisone     Lines:   -NGT x 2  -nichols  -R IJ (12/7)  -L IJ (12/7)  -ALYSSA x 2 Assessment: 74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 Patient's post-op course has been c/b multiple reintubation, total colectomy w/ ileostomy, and IABP placement 2/2 cardiogenic shock with subsequent removal.     Neuro:  - Follows commands, off sedation  - pain control w/ oxycodone  - CT head 11/19, 11/21, 11/25, 11/29, 12/5 negative  - EEG: Mild diffuse cerebral dysfunction that is not specific in etiology. No epileptic discharges recorded. No seizures recorded.  - Holding home xanax, Abilify, Zoloft, Remeron, Lexapro     Resp:  -PRVC 14/500/5/30  -SBT daily  -c/w inhaled bronchodilator  -VBG daily    CV:  -IABP removed 12/10  -dobutamine gtt  -amiodarone gtt  -trend lactate  -hold home metoprolol  -TTE 12/11 shows EF of 55-60%    GI:  -trend LFTs  -NPO w/ tube feeds  -protonix QD  -high output from ALYSSA drains, continue to monitor quantity & quality    /Renal:  -c/w CRRT goal net -100  -monitor BUN/Cr  -I&Os, trend UOP  -elevate scrotum i/s/o edema   -repeat scrotal US tomorrow as per urology    Heme:  -trend H/H  -monitor coags    ID:  -ABx w/ cefepime, metronidazole, fluconazole   -monitor WBC, fever curve  -CMV PPx w/ ganciclovir  -holding cellcept, cyclosporine    Endo:  -monitor glucose   -SDS   -ISS     Lines:   -NGT x 2  -nichols  -R IJ (12/7)  -L IJ (12/7)  -ALYSSA x 2

## 2024-12-13 NOTE — PROGRESS NOTE ADULT - SUBJECTIVE AND OBJECTIVE BOX
Transplant Surgery - Multidisciplinary Rounds  --------------------------------------------------------------  OLT   11/15/2024        POD#28  Colectomy 2024   POD#6  IABP  removed 12/10    Present:   Patient seen and examined with multidisciplinary Transplant team including Surgeon: Dr. Palomino, Dr. Sorto, JAZZ Colby, Hepatologist: Dr. Roe and ICU team in AM rounds.   Disciplines not in attendance will be notified of the plan.     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     s/p OLT 11/15 with post op course c/b:  ·	Hypoxia: Reintubated , extubated ->reintubated , extubated , reintubated   ·	Ileus   ·	Fever  ·	A fib  ·	AMS/Seizure   ·	L brachial DVT  ·	Neutropenia  ·	E coli Bacteremia ()  ·	s/p Coloctomy .   ·	IABP , removed 12/10    Interval/Overnight Events:   - patient off sedation and tolerated 10h CPAP trial  - off levo/vaso in minimal  for CV support  - CRRT  - ileostomy with bowel sweat    Immunosuppression:  -Cyclo (held), MMF held this am, on stress dose steroids  -ongoing monitoring for signs of rejection    Potential Discharge date: Pending clinical course  Education: Medications  Plan of care: See Below    MEDICATIONS  (STANDING):  albumin human 25% IVPB 100 milliLiter(s) IV Intermittent every 6 hours  aMIOdarone Infusion 0.5 mG/Min (16.7 mL/Hr) IV Continuous <Continuous>  Calcium Chloride 7 Gram(s),Sodium Chloride 0.9% 600 milliLiter(s) 7 Gram(s) (29.17 mL/Hr) IV Continuous <Continuous>  cefepime   IVPB      cefepime   IVPB 1000 milliGRAM(s) IV Intermittent every 8 hours  chlorhexidine 0.12% Liquid 15 milliLiter(s) Oral Mucosa every 12 hours  chlorhexidine 2% Cloths 1 Application(s) Topical <User Schedule>  CRRT Treatment    <Continuous>  dexMEDEtomidine Infusion 1 MICROgram(s)/kG/Hr (23.4 mL/Hr) IV Continuous <Continuous>  dextrose 50% Injectable 50 milliLiter(s) IV Push every 15 minutes  DOBUTamine Infusion 2 MICROgram(s)/kG/Min (5.62 mL/Hr) IV Continuous <Continuous>  fluconAZOLE IVPB 400 milliGRAM(s) IV Intermittent every 24 hours  ganciclovir IVPB 120 milliGRAM(s) IV Intermittent every 24 hours  hydrocortisone sodium succinate Injectable 50 milliGRAM(s) IV Push every 12 hours  insulin regular Infusion 1 Unit(s)/Hr (1 mL/Hr) IV Continuous <Continuous>  metroNIDAZOLE  IVPB 500 milliGRAM(s) IV Intermittent every 12 hours  norepinephrine Infusion 0.01 MICROgram(s)/kG/Min (1.76 mL/Hr) IV Continuous <Continuous>  pantoprazole  Injectable 40 milliGRAM(s) IV Push every 12 hours  Phoxillum Filtration BK 4 / 2.5 5000 milliLiter(s) (1500 mL/Hr) CRRT <Continuous>  PrismaSOL Filtration BGK 4 / 2.5 5000 milliLiter(s) (1250 mL/Hr) CRRT <Continuous>  PrismaSOL Filtration BGK 4 / 2.5 5000 milliLiter(s) (250 mL/Hr) CRRT <Continuous>  sodium bicarbonate  Injectable 50 milliEquivalent(s) IV Push <User Schedule>  thiamine Injectable 100 milliGRAM(s) IV Push daily  vasopressin Infusion 0.01 Unit(s)/Min (1.5 mL/Hr) IV Continuous <Continuous>    MEDICATIONS  (PRN):  levalbuterol Inhalation 0.63 milliGRAM(s) Inhalation every 8 hours PRN Shortness of breath  oxyCODONE    IR 5 milliGRAM(s) Oral every 4 hours PRN Moderate Pain (4 - 6)      PAST MEDICAL & SURGICAL HISTORY:  Diabetes      Transaminitis      Paroxysmal atrial fibrillation      Depression      BPH (benign prostatic hyperplasia)      Hypertension      Chronic atrial fibrillation      Coronary artery disease      Hepatocellular carcinoma      DM (diabetes mellitus)      HTN (hypertension)      Paroxysmal atrial fibrillation      Cirrhosis      HCC (hepatocellular carcinoma)      History of BPH      History of laparoscopic cholecystectomy      History of lumbar laminectomy      H/O prior ablation treatment      H/O percutaneous left heart catheterization          Vital Signs Last 24 Hrs  T(C): 36.8 (13 Dec 2024 00:00), Max: 37.2 (12 Dec 2024 04:00)  T(F): 98.2 (13 Dec 2024 00:00), Max: 99 (12 Dec 2024 04:00)  HR: 80 (13 Dec 2024 01:00) (68 - 90)  BP: --  BP(mean): --  RR: 18 (13 Dec 2024 01:00) (18 - 41)  SpO2: 97% (13 Dec 2024 01:00) (94% - 100%)    Parameters below as of 13 Dec 2024 00:00  Patient On (Oxygen Delivery Method): ventilator    O2 Concentration (%): 40    I&O's Summary    11 Dec 2024 07:01  -  12 Dec 2024 07:00  --------------------------------------------------------  IN: 4231.8 mL / OUT: 8012 mL / NET: -3780.2 mL    12 Dec 2024 07:01  -  13 Dec 2024 01:32  --------------------------------------------------------  IN: 2242.5 mL / OUT: 4548 mL / NET: -2305.5 mL                              10.4   9.62  )-----------(        ( 13 Dec 2024 00:37 )             30.4     12    139  |  106  |  38[H]  ----------------------------<  137[H]  4.2   |  20[L]  |  0.89    Ca    9.3      13 Dec 2024 00:38  Phos  2.7     12-13  Mg     2.4     12-13    TPro  4.1[L]  /  Alb  3.6  /  TBili  14.0[H]  /  DBili  x   /  AST  41[H]  /  ALT  56[H]  /  AlkPhos  80  12-13      ROS: Unable to assess patient is intubated, sedated    PHYSICAL EXAM:   Constitutional: intubated, sedated   Eyes:  PERRLA  ENMT: nc/at, no thrush   Neck: supple   Respiratory: CTA B/L  Cardiovascular: RRR  Gastrointestinal: incision clean/dry/intact + Ostomy pink   Genitourinary: Castro in place   Extremities: SCD's in place and working bilaterally, + LE edema  Neurological: intubated, sedated  Skin: no rashes, ulcerations, lesions Transplant Surgery - Multidisciplinary Rounds  --------------------------------------------------------------  OLT   11/15/2024        POD#28  Colectomy 12/7/2024   POD#6  IABP 12/7 removed 12/10    Present:   Patient seen and examined with multidisciplinary Transplant team including Surgeon: Dr. Palomino, Dr. Sorto, JAZZ Colby/[facility], Hepatologist: Dr. Roe and ICU team in AM rounds.   Disciplines not in attendance will be notified of the plan.     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     s/p OLT 11/15 with post op course c/b:  ·	Hypoxia: Reintubated 11/20, extubated 11/24->reintubated 11/24, extubated 11/26, reintubated 12/7  ·	Ileus   ·	Fever  ·	A fib  ·	AMS/Seizure   ·	L brachial DVT  ·	Neutropenia  ·	E coli Bacteremia (12/6)  ·	s/p Coloctomy 12/7.   ·	IABP 12/7, removed 12/10    Interval/Overnight Events:   - afebrile  - patient off sedation and tolerated 10h CPAP trial  - dobu 2, off levo and vaso  - amio gtt  - CRRT  - ileostomy with bowel sweat  -1u cyro and 1u FFP    Immunosuppression:  -Cyclo (held), MMF held this am, on stress dose steroids  -ongoing monitoring for signs of rejection    Potential Discharge date: Pending clinical course  Education: Medications  Plan of care: See Below    MEDICATIONS  (STANDING):  albumin human 25% IVPB 100 milliLiter(s) IV Intermittent every 6 hours  aMIOdarone Infusion 0.5 mG/Min (16.7 mL/Hr) IV Continuous <Continuous>  Calcium Chloride 7 Gram(s),Sodium Chloride 0.9% 600 milliLiter(s) 7 Gram(s) (29.17 mL/Hr) IV Continuous <Continuous>  cefepime   IVPB      cefepime   IVPB 1000 milliGRAM(s) IV Intermittent every 8 hours  chlorhexidine 0.12% Liquid 15 milliLiter(s) Oral Mucosa every 12 hours  chlorhexidine 2% Cloths 1 Application(s) Topical <User Schedule>  CRRT Treatment    <Continuous>  dexMEDEtomidine Infusion 1 MICROgram(s)/kG/Hr (23.4 mL/Hr) IV Continuous <Continuous>  dextrose 50% Injectable 50 milliLiter(s) IV Push every 15 minutes  DOBUTamine Infusion 2 MICROgram(s)/kG/Min (5.62 mL/Hr) IV Continuous <Continuous>  fluconAZOLE IVPB 400 milliGRAM(s) IV Intermittent every 24 hours  ganciclovir IVPB 120 milliGRAM(s) IV Intermittent every 24 hours  hydrocortisone sodium succinate Injectable 50 milliGRAM(s) IV Push every 12 hours  insulin regular Infusion 1 Unit(s)/Hr (1 mL/Hr) IV Continuous <Continuous>  metroNIDAZOLE  IVPB 500 milliGRAM(s) IV Intermittent every 12 hours  norepinephrine Infusion 0.01 MICROgram(s)/kG/Min (1.76 mL/Hr) IV Continuous <Continuous>  pantoprazole  Injectable 40 milliGRAM(s) IV Push every 12 hours  Phoxillum Filtration BK 4 / 2.5 5000 milliLiter(s) (1500 mL/Hr) CRRT <Continuous>  PrismaSOL Filtration BGK 4 / 2.5 5000 milliLiter(s) (1250 mL/Hr) CRRT <Continuous>  PrismaSOL Filtration BGK 4 / 2.5 5000 milliLiter(s) (250 mL/Hr) CRRT <Continuous>  sodium bicarbonate  Injectable 50 milliEquivalent(s) IV Push <User Schedule>  thiamine Injectable 100 milliGRAM(s) IV Push daily  vasopressin Infusion 0.01 Unit(s)/Min (1.5 mL/Hr) IV Continuous <Continuous>    MEDICATIONS  (PRN):  levalbuterol Inhalation 0.63 milliGRAM(s) Inhalation every 8 hours PRN Shortness of breath  oxyCODONE    IR 5 milliGRAM(s) Oral every 4 hours PRN Moderate Pain (4 - 6)      PAST MEDICAL & SURGICAL HISTORY:  Diabetes      Transaminitis      Paroxysmal atrial fibrillation      Depression      BPH (benign prostatic hyperplasia)      Hypertension      Chronic atrial fibrillation      Coronary artery disease      Hepatocellular carcinoma      DM (diabetes mellitus)      HTN (hypertension)      Paroxysmal atrial fibrillation      Cirrhosis      HCC (hepatocellular carcinoma)      History of BPH      History of laparoscopic cholecystectomy      History of lumbar laminectomy      H/O prior ablation treatment      H/O percutaneous left heart catheterization          Vital Signs Last 24 Hrs  T(C): 36.8 (13 Dec 2024 00:00), Max: 37.2 (12 Dec 2024 04:00)  T(F): 98.2 (13 Dec 2024 00:00), Max: 99 (12 Dec 2024 04:00)  HR: 80 (13 Dec 2024 01:00) (68 - 90)  BP: --  BP(mean): --  RR: 18 (13 Dec 2024 01:00) (18 - 41)  SpO2: 97% (13 Dec 2024 01:00) (94% - 100%)    Parameters below as of 13 Dec 2024 00:00  Patient On (Oxygen Delivery Method): ventilator    O2 Concentration (%): 40    I&O's Summary    11 Dec 2024 07:01  -  12 Dec 2024 07:00  --------------------------------------------------------  IN: 4231.8 mL / OUT: 8012 mL / NET: -3780.2 mL    12 Dec 2024 07:01  -  13 Dec 2024 01:32  --------------------------------------------------------  IN: 2242.5 mL / OUT: 4548 mL / NET: -2305.5 mL                              10.4   9.62  )-----------( 13       ( 13 Dec 2024 00:37 )             30.4     12-13    139  |  106  |  38[H]  ----------------------------<  137[H]  4.2   |  20[L]  |  0.89    Ca    9.3      13 Dec 2024 00:38  Phos  2.7     12-13  Mg     2.4     12-13    TPro  4.1[L]  /  Alb  3.6  /  TBili  14.0[H]  /  DBili  x   /  AST  41[H]  /  ALT  56[H]  /  AlkPhos  80  12-13      ROS: Unable to assess patient is intubated, sedated    PHYSICAL EXAM:   Constitutional: intubated, sedated   Eyes:  PERRLA  ENMT: nc/at, no thrush   Neck: supple   Respiratory: CTA B/L  Cardiovascular: RRR  Gastrointestinal: incision clean/dry/intact + Ostomy pink   Genitourinary: Castro in place   Extremities: SCD's in place and working bilaterally, + LE edema  Neurological: intubated, sedated  Skin: no rashes, ulcerations, lesions

## 2024-12-13 NOTE — PROGRESS NOTE ADULT - ASSESSMENT
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     s/p OLT 11/15 with complicated post op course    [] Septic shock/Cardiogenic shock  [] s/p Colectomy 12/7 POD#5  [] RTOR for closure and Ileostomy creation   [] IAPB 12/7- removed 12/10: CTICU on board  - E coli Bacteremia (12/6) - on jeniffer/caspo -> switched to  cefepime/ flagyl/fluc Received Tobra x 1 12/6 .  - Incr Fluc 400mg   - Neutropenia: received Neupogen 480mcg x2  - MORAIMA: CRRT started 12/7, low u/o  - solucortef q12, dc florinef  - trend lactate  - echo 12/11    [] s/p OLT POD #28  - trend LFT's  - Diet: trickle tube feeds   - Pain management: avoid narcotics  - Strict I&Os, nichols, wound vac placed 12/10   - US: L brachial DVT   - wound vac     [ ] Immunosuppression  - Tacrolimus stopped 11/18 in setting of AMS/Seizure, Started Cyclo 11/24  - Cyclo held, MMF held, on stress dose steroids  - PPx: Gancyclovir, bactrim    [] lleus   -@ home on Linzess  - imaging with distended colon (likely opioid induced)  - s/p Neostigmine x 2  - s/p Relistor, last dose 12/1  - s/p colectomy with end ileostomy  (see above)  - Daily AXR     [] CMV viremia  - currently on Gancyclovir   - repeat CMV PCR     [ ] AMS  - Reintubated 11/20 Aspiration/hypoxia, extubated 11/24->[id number]--> extubated 11/26 -> [id number]  - EEG 11/30 negative, Neurology following  - BH following   - off tacro  - on keppra    [ ] HTN/ pAFib  - On Amiodarone  - metoprolol held (hypotensive)   - Eliquis 5mg bid - held 12/6.   - Dr. 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     s/p OLT 11/15 with complicated post op course    [] Septic shock/Cardiogenic shock  [] s/p Colectomy 12/7 POD#5  [] RTOR for closure and Ileostomy creation   [] IAPB 12/7- removed 12/10: CTICU on board  - E coli Bacteremia (12/6) - on jeniffer/caspo -> switched to  cefepime/ flagyl/fluc Received Tobra x 1 12/6 .  - Fluc 400mg   - Neutropenia: received Neupogen 480mcg x2  - MORAIMA: CRRT started 12/7, low u/o  - solucortef q12, dc florinef  - trend lactate  - echo 12/11    [] s/p OLT POD #28  - trend LFT's  - Diet: increase TF    - Pain management: avoid narcotics  - Strict I&Os, nichols, wound vac placed 12/10   - US: L brachial DVT   - wound vac exchange for ileostomy (12/13)  - f/u fract bili    [ ] Immunosuppression  - Tacrolimus stopped 11/18 in setting of AMS/Seizure, Started Cyclo 11/24  - Cyclo held, MMF held, on stress dose steroids  - PPx: Gancyclovir, bactrim    [] lleus   -@ home on Linzess  - imaging with distended colon (likely opioid induced)  - s/p Neostigmine x 2  - s/p Relistor, last dose 12/1  - s/p colectomy with end ileostomy  (see above)  - Daily AXR     [] CMV viremia  - currently on Gancyclovir   - CMV PCR (12/11): neg    [ ] AMS  - Reintubated 11/20 Aspiration/hypoxia, extubated 11/24->[id number]/24--> extubated 11/26 -> [id number]/7  - EEG 11/30 negative, Neurology following  - BH following   - off tacro  - on keppra    [ ] HTN/ pAFib  - On Amiodarone  - metoprolol held (hypotensive)   - Eliquis 5mg bid - held 12/6.   - Dr. Quintanilla following    [ ] DM  - ISS

## 2024-12-13 NOTE — PROGRESS NOTE ADULT - ASSESSMENT
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 Pt underwent Y90 Sept, 2023 with favorable treatment response.s/p OLT 11/15/24     on 12/7/24 s/p Total abdominal colectomy with ileostomy  on 12/9 s/p ex-lap with ileostomy.    # Ileus/ischemic bowel  ileus for >1 week- Cdiff neg 12/1  S/p rectal decompression  # severe septic shock 12/6 and pancytopenia, lactic acidosis  # Ecoli bacteremia in context of above  received meropenem, linezolid/caspofungin + tobramycin x1 on 11/6  BC 12/6 positive  s/p colectomy on 12/6- noted dusky appearance- plan to go back to OR  # cardiogenic shock  on dobutamine- s/p impella  # LLL mucus plug  --Continue Cefepime 1g IV Q8H while on CVVHD  --Continue Metronidazole 500 mg IV Q12H through 12/14/24 to complete 7 days of empiric anaerobic coverage  --Fluconazole increased to 400 mg Q24H while on CVVHD  --Continue to follow CBC with diff  --Continue to follow temperature curve  --Follow up on preliminary blood cultures    #Testicular Erythema/Edema  Repeat Testicular US (12/12) 2.1 x0.9 x 3.2 cm focal, complex fluid collection within the soft tissues inferior to the right testicle  HSV/VZV PCR per dermatology negative  Dermatology fungal culture with no growth  --Urology with recommendation for repeat testicular US. If any further concerns would instead repeat CT A/P    #s/p OLT 11/15/24 CMV D?R? EBV , toxo D?/R?  --Bactrim on hold given thrombocytopenia, unlikely to absorb atovaquone  --If no thrombocytopenia recovery (with other adjustments) can switch to IV Letermovir    #Fever, sepsis, hypoxic respiratory failure s/p on mechanical ventilation  CT C/A/P Bilateral lower lobe consolidation with fluid and debris in the right lower lobe bronchi, concerning for aspiration pneumonia.  CMV PCR (11/24) 146 (low level CMV viremia - not likely contributing)  Adenovirus PCR (11/24) Negative  Cryptococcal Serum Ag (11/24) Negative  serum and bronch aspergillus galactomannan negative  WNV Serology and Serum PCR Negative  CT Chest (11/25) Groundglass opacities in the right lower and left upper lobes, possibly infectious in nature.  CT A/P (11/25) No acute process  s/p Meropenem 1 gram q 8hr (11/23 >11/29)    Dr. Rachele Lewis will be covering the patient starting from tomorrow. Please reach out to her for further questions and follow up.     Kyle Junior M.D.  [facility] Division of Infectious Disease  8AM-5PM Monday - Friday: Available on [employer] Teams  After Hours and Holidays (or if no response on [employer] Teams): Please contact the Infectious Diseases Office at [phone]

## 2024-12-13 NOTE — CONSULT NOTE ADULT - SUBJECTIVE AND OBJECTIVE BOX
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 Pt underwent Y90 Sept, 2023 with favorable treatment response. Pt completed OLTx evaluation and listed for OLTx 5/03/2024       PAST MEDICAL & SURGICAL HISTORY:    Diabetes      Transaminitis      Paroxysmal atrial fibrillation      Depression      BPH (benign prostatic hyperplasia)      Hypertension      Chronic atrial fibrillation      Coronary artery disease      Hepatocellular carcinoma      DM (diabetes mellitus)      HTN (hypertension)      Paroxysmal atrial fibrillation      Cirrhosis      HCC (hepatocellular carcinoma)      History of BPH      History of laparoscopic cholecystectomy      History of lumbar laminectomy      H/O prior ablation treatment      H/O percutaneous left heart catheterization          Allergies    No Known Allergies    Intolerances        MEDICATIONS  (STANDING):  aMIOdarone Infusion 0.5 mG/Min (16.7 mL/Hr) IV Continuous <Continuous>  Calcium Chloride 5 Gram(s),Sodium Chloride 0.9 400 milliLiter(s) 5 Gram(s) (16.67 mL/Hr) IV Continuous <Continuous>  cefepime   IVPB      cefepime   IVPB 1000 milliGRAM(s) IV Intermittent every 8 hours  chlorhexidine 0.12% Liquid 15 milliLiter(s) Oral Mucosa every 12 hours  chlorhexidine 2% Cloths 1 Application(s) Topical <User Schedule>  CRRT Treatment    <Continuous>  dextrose 50% Injectable 50 milliLiter(s) IV Push every 15 minutes  DOBUTamine Infusion 2 MICROgram(s)/kG/Min (5.62 mL/Hr) IV Continuous <Continuous>  fluconAZOLE IVPB 400 milliGRAM(s) IV Intermittent every 24 hours  ganciclovir IVPB 120 milliGRAM(s) IV Intermittent every 24 hours  hydrocortisone sodium succinate Injectable 50 milliGRAM(s) IV Push every 12 hours  insulin regular Infusion 1 Unit(s)/Hr (1 mL/Hr) IV Continuous <Continuous>  metroNIDAZOLE  IVPB 500 milliGRAM(s) IV Intermittent every 12 hours  pantoprazole  Injectable 40 milliGRAM(s) IV Push every 12 hours  Phoxillum Filtration BK 4 / 2.5 5000 milliLiter(s) (1500 mL/Hr) CRRT <Continuous>  PrismaSOL Filtration BGK 4 / 2.5 5000 milliLiter(s) (1250 mL/Hr) CRRT <Continuous>  PrismaSOL Filtration BGK 4 / 2.5 5000 milliLiter(s) (250 mL/Hr) CRRT <Continuous>  sodium bicarbonate  Injectable 50 milliEquivalent(s) IV Push <User Schedule>  thiamine Injectable 100 milliGRAM(s) IV Push daily    MEDICATIONS  (PRN):  levalbuterol Inhalation 0.63 milliGRAM(s) Inhalation every 8 hours PRN Shortness of breath  oxyCODONE    IR 5 milliGRAM(s) Oral every 4 hours PRN Moderate Pain (4 - 6)      FAMILY HISTORY:  Family history of coronary artery disease (Father, Sibling, Sibling)    Family history of diabetes mellitus (Father, Mother)    Family history of coronary artery disease (Sibling)        SOCIAL HISTORY: No EtOH, no tobacco    REVIEW OF SYSTEMS: Unable to obtain as patient intubated and sedate       T(F): 98.1 (12-13-24 @ 07:00), Max: 99 (12-12-24 @ 16:00)  HR: 90 (12-13-24 @ 10:22)  BP: --  RR: 18 (12-13-24 @ 10:00)  SpO2: 96% (12-13-24 @ 10:22)  Wt(kg): --    GENERAL:  intubated, sedated   HEAD:  Atraumatic, Normocephalic  EYES: EOMI, PERRLA, conjunctiva and sclera clear  CHEST/LUNG: Clear to auscultation  HEART: Regular rate and rhythm  ABDOMEN: incision clean/dry/intact + Ostomy pink   EXTREMITIES:  LE edema   NEUROLOGY: intubated, sedated  SKIN: No rashes or lesions                          10.4   9.62  )-----------( 13       ( 13 Dec 2024 00:37 )             30.4         139  |  106  |  38[H]  ----------------------------<  137[H]  4.2   |  20[L]  |  0.89    Ca    9.3      13 Dec 2024 00:38  Phos  2.7     12-13  Mg     2.4     12-13    TPro  4.1[L]  /  Alb  3.6  /  TBili  14.0[H]  /  DBili  x   /  AST  41[H]  /  ALT  56[H]  /  AlkPhos  80  12-13      Phosphorus: 2.7 mg/dL (12-13 @ 00:38)  Magnesium: 2.4 mg/dL (12-13 @ 00:38)  Magnesium: 2.4 mg/dL (12-12 @ 12:27)  Phosphorus: 2.6 mg/dL (12-12 @ 12:27)      PT/INR - ( 13 Dec 2024 00:37 )   PT: 23.6 sec;   INR: 2.09 ratio         PTT - ( 13 Dec 2024 00:37 )  PTT:60.7 sec    .Blood BLOOD  12-07 @ 15:30   No growth at 5 days  --  --      Body Fluid  12-07 @ 11:18   No growth at 5 days  --    polymorphonuclear leukocytes seen  No organisms seen  by cytocentrifuge      .Blood BLOOD  12-06 @ 13:30   Growth in aerobic and anaerobic bottles: Escherichia coli  See previous culture [id number]  --    Growth in aerobic bottle: Gram Negative Rods  Growth in anaerobic bottle: Gram Negative Rods      .Blood BLOOD  12-06 @ 13:25   Growth in aerobic and anaerobic bottles: Escherichia coli  Direct identification is available within approximately 3-5  hours either by Blood Panel Multiplexed PCR or Direct  MALDI-TOF. Details: [email or web address]  --  Blood Culture PCR  Escherichia coli      .Blood BLOOD  12-05 @ 14:15   No growth at 5 days  --  --      .Blood BLOOD  12-05 @ 14:00   No growth at 5 days  --  --      .Stool  12-01 @ 08:20   No enteric pathogens isolated.  (Stool culture examined for Salmonella,  Shigella, Campylobacter, Aeromonas, Plesiomonas,  Vibrio, E.coli O157 and Yersinia)  --  --      .Blood BLOOD  11-26 @ 06:25   No growth at 5 days  --  --      .Blood BLOOD  11-26 @ 04:50   No growth at 5 days  --  --      Bronchial  11-24 @ 17:25   Few Candida glabrata  --  Candida (Torulopsis) glabrata      .Blood BLOOD  11-24 @ 11:10   No growth at 5 days  --  --      .Blood BLOOD  11-24 @ 11:05   No growth at 5 days  --  --      Combi-Cath  11-23 @ 13:09   Commensal charity consistent with body site  --    Moderate polymorphonuclear leukocytes per low power field  No squamous epithelial cells per low power field  No organisms seen      .Blood BLOOD  11-22 @ 17:47   No growth at 5 days  --  --      .Blood BLOOD  11-20 @ 11:53   No growth at 5 days  --  --      .Blood BLOOD  11-20 @ 11:43   No growth at 5 days  --  --      Body Fluid  11-15 @ 20:13   No growth at 5 days  --    No polymorphonuclear leukocytes seen  No organisms seen  by cytocentrifuge

## 2024-12-13 NOTE — PROGRESS NOTE ADULT - NS ATTEND AMEND GEN_ALL_CORE FT
POD#4 s/p creation ileostomy and closure of abdomen; POD# 28 s/p OLT  Transaminases continue to improve, TBili continues to rise  lactate down to <2  remains intubated on low dose dobutamine and amiodarone. Aortic balloon pump removed  echo showing improvement in EF. cont to monitor  Immunosuppression - Cyclosporine and cellcept held. Cont stress dose hydrocortisone  +ileostomy functioning. Increase rate of tube feeds  sepsis improving but remains critically ill. cont abx as per ID, f/u cultures and pathology.  Cont VAC over abdominal wound. Cont albumin replacement for large volume ascites drainage.  Ca and bicarb replacement  Cont CVVH as per nephrology. remains oliguric  awaiting transfer to SICU

## 2024-12-13 NOTE — PROGRESS NOTE ADULT - SUBJECTIVE AND OBJECTIVE BOX
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 Patient has increasingly complicated post-op course     c/b worsening mental status and re-intubation 11/20 for acute hypoxemic respiratory failure 2/2 aspiration, extubated 11/24 and re-intubated 11/24.      24 HOUR EVENTS:    NEURO  RASS (if intubated): 		CAM ICU (if concern for delirium):  Exam:   Meds: oxyCODONE    IR 5 milliGRAM(s) Oral every 4 hours PRN Moderate Pain (4 - 6)      RESPIRATORY  RR: 17 (12-13-24 @ 11:00) (17 - 38)  SpO2: 97% (12-13-24 @ 12:02) (94% - 100%)  Wt(kg): --  Exam:  Mechanical Ventilation: Mode: CPAP with PS, RR (patient): 15, FiO2: 40, PEEP: 7, PS: 10, ITime: 1, MAP: 10, PIP: 17  ABG - ( 13 Dec 2024 05:26 )  pH: 7.48  /  pCO2: 34    /  pO2: 175   / HCO3: 25    / Base Excess: 2.0   /  SaO2: 99.3    Lactate: x                Meds:     CARDIOVASCULAR  HR: 100 (12-13-24 @ 12:02) (76 - 100)  BP: --  BP(mean): --  ABP: 139/59 (12-13-24 @ 11:00) (125/51 - 173/73)  ABP(mean): 89 (12-13-24 @ 11:00) (74 - 107)  Wt(kg): --  CVP(cm H2O): --      Exam:   Cardiac Rhythm:   Perfusion     [ ]Adequate   [ ]Inadequate  Mentation   [ ]Normal       [ ]Reduced  Extremities  [ ]Warm         [ ]Cool  Volume Status [ ]Hypervolemic [ ]Euvolemic [ ]Hypovolemic  Meds: aMIOdarone Infusion 0.5 mG/Min IV Continuous <Continuous>  DOBUTamine Infusion 2 MICROgram(s)/kG/Min IV Continuous <Continuous>      GI/NUTRITION  Exam:   Diet:   Meds: pantoprazole  Injectable 40 milliGRAM(s) IV Push every 12 hours      GENITOURINARY  I&O's Detail    12-12 @ 07:01  -  12-13 @ 07:00  --------------------------------------------------------  IN:    Albumin 25%  -  50 mL: 500 mL    Amiodarone: 400.8 mL    Cryoprecipitate: 225 mL    DOBUTamine: 8.4 mL    DOBUTamine: 128.8 mL    Enteral Tube Flush: 20 mL    freetext medication - Infusion: 698.4 mL    Insulin: 56 mL    IV PiggyBack: 50 mL    IV PiggyBack: 650 mL    Nepro with Carb Steady: 240 mL    Platelets - Single Donor: 225 mL    Vasopressin: 1.5 mL  Total IN: 3203.9 mL    OUT:    Bulb (mL): 1000 mL    Bulb (mL): 1730 mL    Dexmedetomidine: 0 mL    Indwelling Catheter - Urethral (mL): 40 mL    Norepinephrine: 0 mL    Other (mL): 3308 mL  Total OUT: 6078 mL    Total NET: -2874.1 mL      12-13 @ 07:01  -  12-13 @ 12:54  --------------------------------------------------------  IN:    Amiodarone: 66.8 mL    Cryoprecipitate: 150 mL    DOBUTamine: 22.4 mL    Enteral Tube Flush: 30 mL    freetext medication - Infusion: 87.3 mL    freetext medication - Infusion: 29.1 mL    Insulin: 12 mL    IV PiggyBack: 50 mL    Nepro with Carb Steady: 70 mL    Platelets - Single Donor: 225 mL  Total IN: 742.6 mL    OUT:    Bulb (mL): 70 mL    Bulb (mL): 170 mL    Indwelling Catheter - Urethral (mL): 0 mL    Other (mL): 863 mL  Total OUT: 1103 mL    Total NET: -360.4 mL          12-13    139  |  106  |  38[H]  ----------------------------<  137[H]  4.2   |  20[L]  |  0.89    Ca    9.3      13 Dec 2024 00:38  Phos  2.7     12-13  Mg     2.4     12-13    TPro  4.1[L]  /  Alb  3.6  /  TBili  14.0[H]  /  DBili  x   /  AST  41[H]  /  ALT  56[H]  /  AlkPhos  80  12-13    Meds: thiamine Injectable 100 milliGRAM(s) IV Push daily      HEMATOLOGIC  Meds:                         10.4   9.62  )-----------( 13       ( 13 Dec 2024 00:37 )             30.4     PT/INR - ( 13 Dec 2024 00:37 )   PT: 23.6 sec;   INR: 2.09 ratio         PTT - ( 13 Dec 2024 00:37 )  PTT:60.7 sec    INFECTIOUS DISEASES  T(C): 36.8 (12-13-24 @ 11:00), Max: 37.2 (12-12-24 @ 16:00)  Wt(kg): --  WBC Count: 9.62 K/uL (12-13 @ 00:37)    Recent Cultures:  Specimen Source: .Other, 12-12 @ 18:00; Results   Testing in progress; Gram Stain: --; Organism: --  Specimen Source: .Blood BLOOD, 12-07 @ 15:30; Results   No growth at 5 days; Gram Stain: --; Organism: --  Specimen Source: Body Fluid, 12-07 @ 11:18; Results   No growth at 5 days; Gram Stain:   polymorphonuclear leukocytes seen  No organisms seen  by cytocentrifuge; Organism: --  Specimen Source: .Blood BLOOD, 12-06 @ 13:30; Results   Growth in aerobic and anaerobic bottles: Escherichia coli  See previous culture [id number][!]; Gram Stain:   Growth in aerobic bottle: Gram Negative Rods  Growth in anaerobic bottle: Gram Negative Rods[!]; Organism: --  Specimen Source: .Blood BLOOD, 12-06 @ 13:25; Results   Growth in aerobic and anaerobic bottles: Escherichia coli  Direct identification is available within approximately 3-5  hours either by Blood Panel Multiplexed PCR or Direct  MALDI-TOF. Details: [email or web address][!]; Gram Stain:   Growth in aerobic and anaerobic bottles: Gram Negative Rods[!]; Organism: Blood Culture PCR  Escherichia coli[!]    Meds: cefepime   IVPB      cefepime   IVPB 1000 milliGRAM(s) IV Intermittent every 8 hours  fluconAZOLE IVPB 400 milliGRAM(s) IV Intermittent every 24 hours  ganciclovir IVPB 120 milliGRAM(s) IV Intermittent every 24 hours  metroNIDAZOLE  IVPB 500 milliGRAM(s) IV Intermittent every 12 hours      ENDOCRINE  Capillary Blood Glucose    Meds: hydrocortisone sodium succinate Injectable 50 milliGRAM(s) IV Push every 12 hours  insulin regular Infusion 1 Unit(s)/Hr IV Continuous <Continuous>      ACCESS DEVICES:  [ ] Peripheral IV  [ ] Central Venous Line		[ ] R	[ ] L	[ ] IJ	[ ] Fem	[ ] SC	Placed:   [ ] Arterial Line			[ ] R	[ ] L	[ ] Fem	[ ] Rad	[ ] Ax	Placed:   [ ] PICC:					[ ] Mediport  [ ] Urinary Catheter, Date Placed:   [ ] Necessity of urinary, arterial, and venous catheters discussed    OTHER MEDICATIONS:  chlorhexidine 0.12% Liquid 15 milliLiter(s) Oral Mucosa every 12 hours  chlorhexidine 2% Cloths 1 Application(s) Topical <User Schedule>  CRRT Treatment    <Continuous>  Phoxillum Filtration BK 4 / 2.5 5000 milliLiter(s) CRRT <Continuous>  PrismaSOL Filtration BGK 4 / 2.5 5000 milliLiter(s) CRRT <Continuous>  PrismaSOL Filtration BGK 4 / 2.5 5000 milliLiter(s) CRRT <Continuous>      IMAGING: HISTORY: 74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 Patient has has increasingly complicated post-op course detailed below.   11/20- worsening mental status and re-intubation 11/20 for acute hypoxemic respiratory failure 2/2 aspiration  11/24- extubated & re-intubated for tachypnea & hypoxia   11/26- extubated and transitioned to NC  12/6- rising lactate, plan to RTOR for ex lap  12/7- colectomy, IABP placed   12/9- RTOR for ileostomy, abdominal closure  12/10- IABP removed  Patient was transferred to SICU on 12/13 for HD monitoring.                24 HOUR EVENTS:    NEURO  RASS (if intubated): 		CAM ICU (if concern for delirium):  Exam:   Meds: oxyCODONE    IR 5 milliGRAM(s) Oral every 4 hours PRN Moderate Pain (4 - 6)      RESPIRATORY  RR: 17 (12-13-24 @ 11:00) (17 - 38)  SpO2: 97% (12-13-24 @ 12:02) (94% - 100%)  Wt(kg): --  Exam:  Mechanical Ventilation: Mode: CPAP with PS, RR (patient): 15, FiO2: 40, PEEP: 7, PS: 10, ITime: 1, MAP: 10, PIP: 17  ABG - ( 13 Dec 2024 05:26 )  pH: 7.48  /  pCO2: 34    /  pO2: 175   / HCO3: 25    / Base Excess: 2.0   /  SaO2: 99.3    Lactate: x                Meds:     CARDIOVASCULAR  HR: 100 (12-13-24 @ 12:02) (76 - 100)  BP: --  BP(mean): --  ABP: 139/59 (12-13-24 @ 11:00) (125/51 - 173/73)  ABP(mean): 89 (12-13-24 @ 11:00) (74 - 107)  Wt(kg): --  CVP(cm H2O): --      Exam:   Cardiac Rhythm:   Perfusion     [ ]Adequate   [ ]Inadequate  Mentation   [ ]Normal       [ ]Reduced  Extremities  [ ]Warm         [ ]Cool  Volume Status [ ]Hypervolemic [ ]Euvolemic [ ]Hypovolemic  Meds: aMIOdarone Infusion 0.5 mG/Min IV Continuous <Continuous>  DOBUTamine Infusion 2 MICROgram(s)/kG/Min IV Continuous <Continuous>      GI/NUTRITION  Exam:   Diet:   Meds: pantoprazole  Injectable 40 milliGRAM(s) IV Push every 12 hours      GENITOURINARY  I&O's Detail    12-12 @ 07:01  -  12-13 @ 07:00  --------------------------------------------------------  IN:    Albumin 25%  -  50 mL: 500 mL    Amiodarone: 400.8 mL    Cryoprecipitate: 225 mL    DOBUTamine: 8.4 mL    DOBUTamine: 128.8 mL    Enteral Tube Flush: 20 mL    freetext medication - Infusion: 698.4 mL    Insulin: 56 mL    IV PiggyBack: 50 mL    IV PiggyBack: 650 mL    Nepro with Carb Steady: 240 mL    Platelets - Single Donor: 225 mL    Vasopressin: 1.5 mL  Total IN: 3203.9 mL    OUT:    Bulb (mL): 1000 mL    Bulb (mL): 1730 mL    Dexmedetomidine: 0 mL    Indwelling Catheter - Urethral (mL): 40 mL    Norepinephrine: 0 mL    Other (mL): 3308 mL  Total OUT: 6078 mL    Total NET: -2874.1 mL      12-13 @ 07:01  -  12-13 @ 12:54  --------------------------------------------------------  IN:    Amiodarone: 66.8 mL    Cryoprecipitate: 150 mL    DOBUTamine: 22.4 mL    Enteral Tube Flush: 30 mL    freetext medication - Infusion: 87.3 mL    freetext medication - Infusion: 29.1 mL    Insulin: 12 mL    IV PiggyBack: 50 mL    Nepro with Carb Steady: 70 mL    Platelets - Single Donor: 225 mL  Total IN: 742.6 mL    OUT:    Bulb (mL): 70 mL    Bulb (mL): 170 mL    Indwelling Catheter - Urethral (mL): 0 mL    Other (mL): 863 mL  Total OUT: 1103 mL    Total NET: -360.4 mL          12-13    139  |  106  |  38[H]  ----------------------------<  137[H]  4.2   |  20[L]  |  0.89    Ca    9.3      13 Dec 2024 00:38  Phos  2.7     12-13  Mg     2.4     12-13    TPro  4.1[L]  /  Alb  3.6  /  TBili  14.0[H]  /  DBili  x   /  AST  41[H]  /  ALT  56[H]  /  AlkPhos  80  12-13    Meds: thiamine Injectable 100 milliGRAM(s) IV Push daily      HEMATOLOGIC  Meds:                         10.4   9.62  )-----------( 13       ( 13 Dec 2024 00:37 )             30.4     PT/INR - ( 13 Dec 2024 00:37 )   PT: 23.6 sec;   INR: 2.09 ratio         PTT - ( 13 Dec 2024 00:37 )  PTT:60.7 sec    INFECTIOUS DISEASES  T(C): 36.8 (12-13-24 @ 11:00), Max: 37.2 (12-12-24 @ 16:00)  Wt(kg): --  WBC Count: 9.62 K/uL (12-13 @ 00:37)    Recent Cultures:  Specimen Source: .Other, 12-12 @ 18:00; Results   Testing in progress; Gram Stain: --; Organism: --  Specimen Source: .Blood BLOOD, 12-07 @ 15:30; Results   No growth at 5 days; Gram Stain: --; Organism: --  Specimen Source: Body Fluid, 12-07 @ 11:18; Results   No growth at 5 days; Gram Stain:   polymorphonuclear leukocytes seen  No organisms seen  by cytocentrifuge; Organism: --  Specimen Source: .Blood BLOOD, 12-06 @ 13:30; Results   Growth in aerobic and anaerobic bottles: Escherichia coli  See previous culture [id number][!]; Gram Stain:   Growth in aerobic bottle: Gram Negative Rods  Growth in anaerobic bottle: Gram Negative Rods[!]; Organism: --  Specimen Source: .Blood BLOOD, 12-06 @ 13:25; Results   Growth in aerobic and anaerobic bottles: Escherichia coli  Direct identification is available within approximately 3-5  hours either by Blood Panel Multiplexed PCR or Direct  MALDI-TOF. Details: [email or web address][!]; Gram Stain:   Growth in aerobic and anaerobic bottles: Gram Negative Rods[!]; Organism: Blood Culture PCR  Escherichia coli[!]    Meds: cefepime   IVPB      cefepime   IVPB 1000 milliGRAM(s) IV Intermittent every 8 hours  fluconAZOLE IVPB 400 milliGRAM(s) IV Intermittent every 24 hours  ganciclovir IVPB 120 milliGRAM(s) IV Intermittent every 24 hours  metroNIDAZOLE  IVPB 500 milliGRAM(s) IV Intermittent every 12 hours      ENDOCRINE  Capillary Blood Glucose    Meds: hydrocortisone sodium succinate Injectable 50 milliGRAM(s) IV Push every 12 hours  insulin regular Infusion 1 Unit(s)/Hr IV Continuous <Continuous>      ACCESS DEVICES:  [ ] Peripheral IV  [ ] Central Venous Line		[ ] R	[ ] L	[ ] IJ	[ ] Fem	[ ] SC	Placed:   [ ] Arterial Line			[ ] R	[ ] L	[ ] Fem	[ ] Rad	[ ] Ax	Placed:   [ ] PICC:					[ ] Mediport  [ ] Urinary Catheter, Date Placed:   [ ] Necessity of urinary, arterial, and venous catheters discussed    OTHER MEDICATIONS:  chlorhexidine 0.12% Liquid 15 milliLiter(s) Oral Mucosa every 12 hours  chlorhexidine 2% Cloths 1 Application(s) Topical <User Schedule>  CRRT Treatment    <Continuous>  Phoxillum Filtration BK 4 / 2.5 5000 milliLiter(s) CRRT <Continuous>  PrismaSOL Filtration BGK 4 / 2.5 5000 milliLiter(s) CRRT <Continuous>  PrismaSOL Filtration BGK 4 / 2.5 5000 milliLiter(s) CRRT <Continuous>      IMAGING: HISTORY: 74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 Patient has has increasingly complicated post-op course detailed below.   11/20- worsening mental status and re-intubation 11/20 for acute hypoxemic respiratory failure 2/2 aspiration  11/24- extubated & re-intubated for tachypnea & hypoxia   11/26- extubated and transitioned to NC  12/6- rising lactate, plan to RTOR for ex lap  12/7- colectomy, IABP placed   12/9- RTOR for ileostomy, abdominal closure  12/10- IABP removed  Patient was transferred to SICU on 12/13 for HD monitoring.        24 HOUR EVENTS:    NEURO  RASS (if intubated): 		CAM ICU (if concern for delirium):  Exam:   Meds: oxyCODONE    IR 5 milliGRAM(s) Oral every 4 hours PRN Moderate Pain (4 - 6)      RESPIRATORY  RR: 17 (12-13-24 @ 11:00) (17 - 38)  SpO2: 97% (12-13-24 @ 12:02) (94% - 100%)  Wt(kg): --  Exam:  Mechanical Ventilation: Mode: CPAP with PS, RR (patient): 15, FiO2: 40, PEEP: 7, PS: 10, ITime: 1, MAP: 10, PIP: 17  ABG - ( 13 Dec 2024 05:26 )  pH: 7.48  /  pCO2: 34    /  pO2: 175   / HCO3: 25    / Base Excess: 2.0   /  SaO2: 99.3    Lactate: x                Meds:     CARDIOVASCULAR  HR: 100 (12-13-24 @ 12:02) (76 - 100)  BP: --  BP(mean): --  ABP: 139/59 (12-13-24 @ 11:00) (125/51 - 173/73)  ABP(mean): 89 (12-13-24 @ 11:00) (74 - 107)  Wt(kg): --  CVP(cm H2O): --      Exam:   Cardiac Rhythm:   Perfusion     [ ]Adequate   [ ]Inadequate  Mentation   [ ]Normal       [ ]Reduced  Extremities  [ ]Warm         [ ]Cool  Volume Status [ ]Hypervolemic [ ]Euvolemic [ ]Hypovolemic  Meds: aMIOdarone Infusion 0.5 mG/Min IV Continuous <Continuous>  DOBUTamine Infusion 2 MICROgram(s)/kG/Min IV Continuous <Continuous>      GI/NUTRITION  Exam:   Diet:   Meds: pantoprazole  Injectable 40 milliGRAM(s) IV Push every 12 hours      GENITOURINARY  I&O's Detail    12-12 @ 07:01  -  12-13 @ 07:00  --------------------------------------------------------  IN:    Albumin 25%  -  50 mL: 500 mL    Amiodarone: 400.8 mL    Cryoprecipitate: 225 mL    DOBUTamine: 8.4 mL    DOBUTamine: 128.8 mL    Enteral Tube Flush: 20 mL    freetext medication - Infusion: 698.4 mL    Insulin: 56 mL    IV PiggyBack: 50 mL    IV PiggyBack: 650 mL    Nepro with Carb Steady: 240 mL    Platelets - Single Donor: 225 mL    Vasopressin: 1.5 mL  Total IN: 3203.9 mL    OUT:    Bulb (mL): 1000 mL    Bulb (mL): 1730 mL    Dexmedetomidine: 0 mL    Indwelling Catheter - Urethral (mL): 40 mL    Norepinephrine: 0 mL    Other (mL): 3308 mL  Total OUT: 6078 mL    Total NET: -2874.1 mL      12-13 @ 07:01  -  12-13 @ 12:54  --------------------------------------------------------  IN:    Amiodarone: 66.8 mL    Cryoprecipitate: 150 mL    DOBUTamine: 22.4 mL    Enteral Tube Flush: 30 mL    freetext medication - Infusion: 87.3 mL    freetext medication - Infusion: 29.1 mL    Insulin: 12 mL    IV PiggyBack: 50 mL    Nepro with Carb Steady: 70 mL    Platelets - Single Donor: 225 mL  Total IN: 742.6 mL    OUT:    Bulb (mL): 70 mL    Bulb (mL): 170 mL    Indwelling Catheter - Urethral (mL): 0 mL    Other (mL): 863 mL  Total OUT: 1103 mL    Total NET: -360.4 mL          12-13    139  |  106  |  38[H]  ----------------------------<  137[H]  4.2   |  20[L]  |  0.89    Ca    9.3      13 Dec 2024 00:38  Phos  2.7     12-13  Mg     2.4     12-13    TPro  4.1[L]  /  Alb  3.6  /  TBili  14.0[H]  /  DBili  x   /  AST  41[H]  /  ALT  56[H]  /  AlkPhos  80  12-13    Meds: thiamine Injectable 100 milliGRAM(s) IV Push daily      HEMATOLOGIC  Meds:                         10.4   9.62  )-----------( 13       ( 13 Dec 2024 00:37 )             30.4     PT/INR - ( 13 Dec 2024 00:37 )   PT: 23.6 sec;   INR: 2.09 ratio         PTT - ( 13 Dec 2024 00:37 )  PTT:60.7 sec    INFECTIOUS DISEASES  T(C): 36.8 (12-13-24 @ 11:00), Max: 37.2 (12-12-24 @ 16:00)  Wt(kg): --  WBC Count: 9.62 K/uL (12-13 @ 00:37)    Recent Cultures:  Specimen Source: .Other, 12-12 @ 18:00; Results   Testing in progress; Gram Stain: --; Organism: --  Specimen Source: .Blood BLOOD, 12-07 @ 15:30; Results   No growth at 5 days; Gram Stain: --; Organism: --  Specimen Source: Body Fluid, 12-07 @ 11:18; Results   No growth at 5 days; Gram Stain:   polymorphonuclear leukocytes seen  No organisms seen  by cytocentrifuge; Organism: --  Specimen Source: .Blood BLOOD, 12-06 @ 13:30; Results   Growth in aerobic and anaerobic bottles: Escherichia coli  See previous culture [id number][!]; Gram Stain:   Growth in aerobic bottle: Gram Negative Rods  Growth in anaerobic bottle: Gram Negative Rods[!]; Organism: --  Specimen Source: .Blood BLOOD, 12-06 @ 13:25; Results   Growth in aerobic and anaerobic bottles: Escherichia coli  Direct identification is available within approximately 3-5  hours either by Blood Panel Multiplexed PCR or Direct  MALDI-TOF. Details: [email or web address][!]; Gram Stain:   Growth in aerobic and anaerobic bottles: Gram Negative Rods[!]; Organism: Blood Culture PCR  Escherichia coli[!]    Meds: cefepime   IVPB      cefepime   IVPB 1000 milliGRAM(s) IV Intermittent every 8 hours  fluconAZOLE IVPB 400 milliGRAM(s) IV Intermittent every 24 hours  ganciclovir IVPB 120 milliGRAM(s) IV Intermittent every 24 hours  metroNIDAZOLE  IVPB 500 milliGRAM(s) IV Intermittent every 12 hours      ENDOCRINE  Capillary Blood Glucose    Meds: hydrocortisone sodium succinate Injectable 50 milliGRAM(s) IV Push every 12 hours  insulin regular Infusion 1 Unit(s)/Hr IV Continuous <Continuous>      ACCESS DEVICES:  [ ] Peripheral IV  [ ] Central Venous Line		[ ] R	[ ] L	[ ] IJ	[ ] Fem	[ ] SC	Placed:   [ ] Arterial Line			[ ] R	[ ] L	[ ] Fem	[ ] Rad	[ ] Ax	Placed:   [ ] PICC:					[ ] Mediport  [ ] Urinary Catheter, Date Placed:   [ ] Necessity of urinary, arterial, and venous catheters discussed    OTHER MEDICATIONS:  chlorhexidine 0.12% Liquid 15 milliLiter(s) Oral Mucosa every 12 hours  chlorhexidine 2% Cloths 1 Application(s) Topical <User Schedule>  CRRT Treatment    <Continuous>  Phoxillum Filtration BK 4 / 2.5 5000 milliLiter(s) CRRT <Continuous>  PrismaSOL Filtration BGK 4 / 2.5 5000 milliLiter(s) CRRT <Continuous>  PrismaSOL Filtration BGK 4 / 2.5 5000 milliLiter(s) CRRT <Continuous>      IMAGING: HISTORY: 74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 Patient has had an increasingly complicated post-op course detailed below.   11/20- worsening mental status and re-intubation 11/20 for acute hypoxemic respiratory failure 2/2 aspiration  11/24- extubated & re-intubated for tachypnea & hypoxia   11/26- extubated and transitioned to NC  12/6- rising lactate, plan to RTOR for ex lap  12/7- colectomy, IABP placed   12/9- RTOR for ileostomy, abdominal closure  12/10- IABP removed  Patient was transferred to SICU on 12/13 for HD monitoring.      24 HOUR EVENTS:  -PA cath removed this AM  -tolerating CPAP on vent  -1u platelets, 5u cryo  -insulin gtt dc'd    NEURO  RASS (if intubated): 		CAM ICU (if concern for delirium):  Exam:  Meds: oxyCODONE    IR 5 milliGRAM(s) Oral every 4 hours PRN Moderate Pain (4 - 6)      RESPIRATORY  RR: 17 (12-13-24 @ 11:00) (17 - 38)  SpO2: 97% (12-13-24 @ 12:02) (94% - 100%)  Wt(kg): --  Exam: CTA b/l  Mechanical Ventilation: Mode: CPAP with PS, RR (patient): 15, FiO2: 40, PEEP: 7, PS: 10, ITime: 1, MAP: 10, PIP: 17  ABG - ( 13 Dec 2024 05:26 )  pH: 7.48  /  pCO2: 34    /  pO2: 175   / HCO3: 25    / Base Excess: 2.0   /  SaO2: 99.3    Lactate: x                Meds:     CARDIOVASCULAR  HR: 100 (12-13-24 @ 12:02) (76 - 100)  BP: --  BP(mean): --  ABP: 139/59 (12-13-24 @ 11:00) (125/51 - 173/73)  ABP(mean): 89 (12-13-24 @ 11:00) (74 - 107)  Wt(kg): --  CVP(cm H2O): --      Exam: S1/S2 auscultated  Cardiac Rhythm: RRR  Perfusion     [ ]Adequate   [ ]Inadequate  Mentation   [ ]Normal       [ ]Reduced  Extremities  [ ]Warm         [ ]Cool  Volume Status [ ]Hypervolemic [ ]Euvolemic [ ]Hypovolemic  Meds: aMIOdarone Infusion 0.5 mG/Min IV Continuous <Continuous>  DOBUTamine Infusion 2 MICROgram(s)/kG/Min IV Continuous <Continuous>      GI/NUTRITION  Exam: soft, non-distended, NTTP  Diet: NPO w/ tube feed  Meds: pantoprazole  Injectable 40 milliGRAM(s) IV Push every 12 hours      GENITOURINARY  I&O's Detail    12-12 @ 07:01  -  12-13 @ 07:00  --------------------------------------------------------  IN:    Albumin 25%  -  50 mL: 500 mL    Amiodarone: 400.8 mL    Cryoprecipitate: 225 mL    DOBUTamine: 8.4 mL    DOBUTamine: 128.8 mL    Enteral Tube Flush: 20 mL    freetext medication - Infusion: 698.4 mL    Insulin: 56 mL    IV PiggyBack: 50 mL    IV PiggyBack: 650 mL    Nepro with Carb Steady: 240 mL    Platelets - Single Donor: 225 mL    Vasopressin: 1.5 mL  Total IN: 3203.9 mL    OUT:    Bulb (mL): 1000 mL    Bulb (mL): 1730 mL    Dexmedetomidine: 0 mL    Indwelling Catheter - Urethral (mL): 40 mL    Norepinephrine: 0 mL    Other (mL): 3308 mL  Total OUT: 6078 mL    Total NET: -2874.1 mL      12-13 @ 07:01  -  12-13 @ 12:54  --------------------------------------------------------  IN:    Amiodarone: 66.8 mL    Cryoprecipitate: 150 mL    DOBUTamine: 22.4 mL    Enteral Tube Flush: 30 mL    freetext medication - Infusion: 87.3 mL    freetext medication - Infusion: 29.1 mL    Insulin: 12 mL    IV PiggyBack: 50 mL    Nepro with Carb Steady: 70 mL    Platelets - Single Donor: 225 mL  Total IN: 742.6 mL    OUT:    Bulb (mL): 70 mL    Bulb (mL): 170 mL    Indwelling Catheter - Urethral (mL): 0 mL    Other (mL): 863 mL  Total OUT: 1103 mL    Total NET: -360.4 mL          12-13    139  |  106  |  38[H]  ----------------------------<  137[H]  4.2   |  20[L]  |  0.89    Ca    9.3      13 Dec 2024 00:38  Phos  2.7     12-13  Mg     2.4     12-13    TPro  4.1[L]  /  Alb  3.6  /  TBili  14.0[H]  /  DBili  x   /  AST  41[H]  /  ALT  56[H]  /  AlkPhos  80  12-13    Meds: thiamine Injectable 100 milliGRAM(s) IV Push daily      HEMATOLOGIC  Meds:                         10.4   9.62  )-----------( 13       ( 13 Dec 2024 00:37 )             30.4     PT/INR - ( 13 Dec 2024 00:37 )   PT: 23.6 sec;   INR: 2.09 ratio         PTT - ( 13 Dec 2024 00:37 )  PTT:60.7 sec    INFECTIOUS DISEASES  T(C): 36.8 (12-13-24 @ 11:00), Max: 37.2 (12-12-24 @ 16:00)  Wt(kg): --  WBC Count: 9.62 K/uL (12-13 @ 00:37)    Recent Cultures:  Specimen Source: .Other, 12-12 @ 18:00; Results   Testing in progress; Gram Stain: --; Organism: --  Specimen Source: .Blood BLOOD, 12-07 @ 15:30; Results   No growth at 5 days; Gram Stain: --; Organism: --  Specimen Source: Body Fluid, 12-07 @ 11:18; Results   No growth at 5 days; Gram Stain:   polymorphonuclear leukocytes seen  No organisms seen  by cytocentrifuge; Organism: --  Specimen Source: .Blood BLOOD, 12-06 @ 13:30; Results   Growth in aerobic and anaerobic bottles: Escherichia coli  See previous culture [id number][!]; Gram Stain:   Growth in aerobic bottle: Gram Negative Rods  Growth in anaerobic bottle: Gram Negative Rods[!]; Organism: --  Specimen Source: .Blood BLOOD, 12-06 @ 13:25; Results   Growth in aerobic and anaerobic bottles: Escherichia coli  Direct identification is available within approximately 3-5  hours either by Blood Panel Multiplexed PCR or Direct  MALDI-TOF. Details: [email or web address]]; Gram Stain:   Growth in aerobic and anaerobic bottles: Gram Negative Rods[!]; Organism: Blood Culture PCR  Escherichia coli[!]    Meds: cefepime   IVPB      cefepime   IVPB 1000 milliGRAM(s) IV Intermittent every 8 hours  fluconAZOLE IVPB 400 milliGRAM(s) IV Intermittent every 24 hours  ganciclovir IVPB 120 milliGRAM(s) IV Intermittent every 24 hours  metroNIDAZOLE  IVPB 500 milliGRAM(s) IV Intermittent every 12 hours      ENDOCRINE  Capillary Blood Glucose    Meds: hydrocortisone sodium succinate Injectable 50 milliGRAM(s) IV Push every 12 hours  insulin regular Infusion 1 Unit(s)/Hr IV Continuous <Continuous>      ACCESS DEVICES:  [ ] Peripheral IV  [ ] Central Venous Line		[ ] R	[ ] L	[ ] IJ	[ ] Fem	[ ] SC	Placed:   [ ] Arterial Line			[ ] R	[ ] L	[ ] Fem	[ ] Rad	[ ] Ax	Placed:   [ ] PICC:					[ ] Mediport  [ ] Urinary Catheter, Date Placed:   [ ] Necessity of urinary, arterial, and venous catheters discussed    OTHER MEDICATIONS:  chlorhexidine 0.12% Liquid 15 milliLiter(s) Oral Mucosa every 12 hours  chlorhexidine 2% Cloths 1 Application(s) Topical <User Schedule>  CRRT Treatment    <Continuous>  Phoxillum Filtration BK 4 / 2.5 5000 milliLiter(s) CRRT <Continuous>  PrismaSOL Filtration BGK 4 / 2.5 5000 milliLiter(s) CRRT <Continuous>  PrismaSOL Filtration BGK 4 / 2.5 5000 milliLiter(s) CRRT <Continuous>      IMAGING: HISTORY: 74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 Patient has had an increasingly complicated post-op course detailed below.   11/20- worsening mental status and re-intubation 11/20 for acute hypoxemic respiratory failure 2/2 aspiration  11/24- extubated & re-intubated for tachypnea & hypoxia   11/26- extubated and transitioned to NC  12/6- rising lactate, plan to RTOR for ex lap  12/7- colectomy, IABP placed   12/9- RTOR for ileostomy, abdominal closure  12/10- IABP removed  Patient was transferred to SICU on 12/13 for HD monitoring.      24 HOUR EVENTS:  -PA cath removed this AM  -tolerating CPAP on vent  -1u platelets, 5u cryo  -insulin gtt dc'd    NEURO  RASS (if intubated): 		CAM ICU (if concern for delirium):  Exam: following commands, intubated  Meds: oxyCODONE    IR 5 milliGRAM(s) Oral every 4 hours PRN Moderate Pain (4 - 6)      RESPIRATORY  RR: 17 (12-13-24 @ 11:00) (17 - 38)  SpO2: 97% (12-13-24 @ 12:02) (94% - 100%)  Wt(kg): --  Exam: CTA b/l  Mechanical Ventilation: Mode: CPAP with PS, RR (patient): 15, FiO2: 40, PEEP: 7, PS: 10, ITime: 1, MAP: 10, PIP: 17  ABG - ( 13 Dec 2024 05:26 )  pH: 7.48  /  pCO2: 34    /  pO2: 175   / HCO3: 25    / Base Excess: 2.0   /  SaO2: 99.3    Lactate: x                Meds:     CARDIOVASCULAR  HR: 100 (12-13-24 @ 12:02) (76 - 100)  BP: --  BP(mean): --  ABP: 139/59 (12-13-24 @ 11:00) (125/51 - 173/73)  ABP(mean): 89 (12-13-24 @ 11:00) (74 - 107)  Wt(kg): --  CVP(cm H2O): --      Exam: S1/S2 auscultated  Cardiac Rhythm: RRR  Perfusion     [ ]Adequate   [ ]Inadequate  Mentation   [ ]Normal       [ ]Reduced  Extremities  [ ]Warm         [ ]Cool  Volume Status [ ]Hypervolemic [ ]Euvolemic [ ]Hypovolemic  Meds: aMIOdarone Infusion 0.5 mG/Min IV Continuous <Continuous>  DOBUTamine Infusion 2 MICROgram(s)/kG/Min IV Continuous <Continuous>      GI/NUTRITION  Exam: soft, non-distended, NTTP  Diet: NPO w/ tube feed  Meds: pantoprazole  Injectable 40 milliGRAM(s) IV Push every 12 hours      GENITOURINARY  I&O's Detail    12-12 @ 07:01  -  12-13 @ 07:00  --------------------------------------------------------  IN:    Albumin 25%  -  50 mL: 500 mL    Amiodarone: 400.8 mL    Cryoprecipitate: 225 mL    DOBUTamine: 8.4 mL    DOBUTamine: 128.8 mL    Enteral Tube Flush: 20 mL    freetext medication - Infusion: 698.4 mL    Insulin: 56 mL    IV PiggyBack: 50 mL    IV PiggyBack: 650 mL    Nepro with Carb Steady: 240 mL    Platelets - Single Donor: 225 mL    Vasopressin: 1.5 mL  Total IN: 3203.9 mL    OUT:    Bulb (mL): 1000 mL    Bulb (mL): 1730 mL    Dexmedetomidine: 0 mL    Indwelling Catheter - Urethral (mL): 40 mL    Norepinephrine: 0 mL    Other (mL): 3308 mL  Total OUT: 6078 mL    Total NET: -2874.1 mL      12-13 @ 07:01  -  12-13 @ 12:54  --------------------------------------------------------  IN:    Amiodarone: 66.8 mL    Cryoprecipitate: 150 mL    DOBUTamine: 22.4 mL    Enteral Tube Flush: 30 mL    freetext medication - Infusion: 87.3 mL    freetext medication - Infusion: 29.1 mL    Insulin: 12 mL    IV PiggyBack: 50 mL    Nepro with Carb Steady: 70 mL    Platelets - Single Donor: 225 mL  Total IN: 742.6 mL    OUT:    Bulb (mL): 70 mL    Bulb (mL): 170 mL    Indwelling Catheter - Urethral (mL): 0 mL    Other (mL): 863 mL  Total OUT: 1103 mL    Total NET: -360.4 mL          12-13    139  |  106  |  38[H]  ----------------------------<  137[H]  4.2   |  20[L]  |  0.89    Ca    9.3      13 Dec 2024 00:38  Phos  2.7     12-13  Mg     2.4     12-13    TPro  4.1[L]  /  Alb  3.6  /  TBili  14.0[H]  /  DBili  x   /  AST  41[H]  /  ALT  56[H]  /  AlkPhos  80  12-13    Meds: thiamine Injectable 100 milliGRAM(s) IV Push daily      HEMATOLOGIC  Meds:                         10.4   9.62  )-----------( 13       ( 13 Dec 2024 00:37 )             30.4     PT/INR - ( 13 Dec 2024 00:37 )   PT: 23.6 sec;   INR: 2.09 ratio         PTT - ( 13 Dec 2024 00:37 )  PTT:60.7 sec    INFECTIOUS DISEASES  T(C): 36.8 (12-13-24 @ 11:00), Max: 37.2 (12-12-24 @ 16:00)  Wt(kg): --  WBC Count: 9.62 K/uL (12-13 @ 00:37)    Recent Cultures:  Specimen Source: .Other, 12-12 @ 18:00; Results   Testing in progress; Gram Stain: --; Organism: --  Specimen Source: .Blood BLOOD, 12-07 @ 15:30; Results   No growth at 5 days; Gram Stain: --; Organism: --  Specimen Source: Body Fluid, 12-07 @ 11:18; Results   No growth at 5 days; Gram Stain:   polymorphonuclear leukocytes seen  No organisms seen  by cytocentrifuge; Organism: --  Specimen Source: .Blood BLOOD, 12-06 @ 13:30; Results   Growth in aerobic and anaerobic bottles: Escherichia coli  See previous culture [id number][!]; Gram Stain:   Growth in aerobic bottle: Gram Negative Rods  Growth in anaerobic bottle: Gram Negative Rods[!]; Organism: --  Specimen Source: .Blood BLOOD, 12-06 @ 13:25; Results   Growth in aerobic and anaerobic bottles: Escherichia coli  Direct identification is available within approximately 3-5  hours either by Blood Panel Multiplexed PCR or Direct  MALDI-TOF. Details: [email or web address]]; Gram Stain:   Growth in aerobic and anaerobic bottles: Gram Negative Rods[!]; Organism: Blood Culture PCR  Escherichia coli[!]    Meds: cefepime   IVPB      cefepime   IVPB 1000 milliGRAM(s) IV Intermittent every 8 hours  fluconAZOLE IVPB 400 milliGRAM(s) IV Intermittent every 24 hours  ganciclovir IVPB 120 milliGRAM(s) IV Intermittent every 24 hours  metroNIDAZOLE  IVPB 500 milliGRAM(s) IV Intermittent every 12 hours      ENDOCRINE  Capillary Blood Glucose    Meds: hydrocortisone sodium succinate Injectable 50 milliGRAM(s) IV Push every 12 hours  insulin regular Infusion 1 Unit(s)/Hr IV Continuous <Continuous>      ACCESS DEVICES:  [ ] Peripheral IV  [ X ] Central Venous Line		[ X ] R	[ X ] L	[ X ] IJ	[ ] Fem	[ ] SC	Placed:   [ ] Arterial Line			[ ] R	[ ] L	[ ] Fem	[ ] Rad	[ ] Ax	Placed:   [ ] PICC:					[ ] Mediport  [ X ] Urinary Catheter, Date Placed:   [ ] Necessity of urinary, arterial, and venous catheters discussed    OTHER MEDICATIONS:  chlorhexidine 0.12% Liquid 15 milliLiter(s) Oral Mucosa every 12 hours  chlorhexidine 2% Cloths 1 Application(s) Topical <User Schedule>  CRRT Treatment    <Continuous>  Phoxillum Filtration BK 4 / 2.5 5000 milliLiter(s) CRRT <Continuous>  PrismaSOL Filtration BGK 4 / 2.5 5000 milliLiter(s) CRRT <Continuous>  PrismaSOL Filtration BGK 4 / 2.5 5000 milliLiter(s) CRRT <Continuous>      IMAGING: HISTORY:  74 male w/ a PMHx of HTN, DM, AF, BPH, MASH cirrhosis and HCC s/p OLT on 11/15. Case was uneventful but post-op course has been prolonged and c/b delirium, aspiration, multiple episodes of acute hypoxic respiratory failure requiring reintubation from 11/20-11/24 & 11/24-11/26, AF w/ RVR, ileus requiring NGT, distended colon requiring rectal tube, dysphagia, malnutrition, hypernatremia, fevers, pancytopenia, poorly controlled glucoses, and left brachial VTE. Patient went into severe refractory septic shock on 12/6. Blood cultures were positive for E. coli. TTE revealed LVOT obstruction & TODD. Patient began complaining of severe abdominal pain and became increasingly tachypneic requiring intubation. Additionally, despite aggressive fluid resuscitation, patient continued to worsen so decision was made to take to the patient to the OR and rest of the hospital course is as follows:  12/7 - s/p exploratory laparotomy, total abdominal colectomy, end ileostomy, 3 intentionally retained laparotomy pads for bleeding, Abthera VAC placement, IABP placement w/ initiation of Jacqui & inotropic support w/ dobutamine, and right IJ Shiley placement w/ initiation of CRRT  12/8 - amiodarone gtt started for AF w/ RVR  12/9 - RTOR for nasoduodenal tube placement & abdominal closure  12/10 - IABP removed w/ TTE demonstrating improved biventricular function  12/11 - severe thrombocytopenia requiring multiple platelet transfusions, started trickle feeds  12/12 - attempt to wean Jacqui unsuccessful, downgraded caspofungin -> fluconazole and meropenem -> cefepime    Patient was transferred back to SICU on 12/13 further management.    24 HOUR EVENTS:  -PA cath removed this AM  -tolerating CPAP on vent  -1u platelets, 5u cryo  -insulin gtt dc'd    NEURO  RASS (if intubated): 		CAM ICU (if concern for delirium):  Exam: following commands, intubated  Meds: oxyCODONE    IR 5 milliGRAM(s) Oral every 4 hours PRN Moderate Pain (4 - 6)      RESPIRATORY  RR: 17 (12-13-24 @ 11:00) (17 - 38)  SpO2: 97% (12-13-24 @ 12:02) (94% - 100%)  Wt(kg): --  Exam: CTA b/l  Mechanical Ventilation: Mode: CPAP with PS, RR (patient): 15, FiO2: 40, PEEP: 7, PS: 10, ITime: 1, MAP: 10, PIP: 17  ABG - ( 13 Dec 2024 05:26 )  pH: 7.48  /  pCO2: 34    /  pO2: 175   / HCO3: 25    / Base Excess: 2.0   /  SaO2: 99.3    Lactate: x                Meds:     CARDIOVASCULAR  HR: 100 (12-13-24 @ 12:02) (76 - 100)  BP: --  BP(mean): --  ABP: 139/59 (12-13-24 @ 11:00) (125/51 - 173/73)  ABP(mean): 89 (12-13-24 @ 11:00) (74 - 107)  Wt(kg): --  CVP(cm H2O): --      Exam: S1/S2 auscultated  Cardiac Rhythm: RRR  Perfusion     [ ]Adequate   [ ]Inadequate  Mentation   [ ]Normal       [ ]Reduced  Extremities  [ ]Warm         [ ]Cool  Volume Status [ ]Hypervolemic [ ]Euvolemic [ ]Hypovolemic  Meds: aMIOdarone Infusion 0.5 mG/Min IV Continuous <Continuous>  DOBUTamine Infusion 2 MICROgram(s)/kG/Min IV Continuous <Continuous>      GI/NUTRITION  Exam: soft, non-distended, NTTP  Diet: NPO w/ tube feed  Meds: pantoprazole  Injectable 40 milliGRAM(s) IV Push every 12 hours      GENITOURINARY  I&O's Detail    12-12 @ 07:01  -  12-13 @ 07:00  --------------------------------------------------------  IN:    Albumin 25%  -  50 mL: 500 mL    Amiodarone: 400.8 mL    Cryoprecipitate: 225 mL    DOBUTamine: 8.4 mL    DOBUTamine: 128.8 mL    Enteral Tube Flush: 20 mL    freetext medication - Infusion: 698.4 mL    Insulin: 56 mL    IV PiggyBack: 50 mL    IV PiggyBack: 650 mL    Nepro with Carb Steady: 240 mL    Platelets - Single Donor: 225 mL    Vasopressin: 1.5 mL  Total IN: 3203.9 mL    OUT:    Bulb (mL): 1000 mL    Bulb (mL): 1730 mL    Dexmedetomidine: 0 mL    Indwelling Catheter - Urethral (mL): 40 mL    Norepinephrine: 0 mL    Other (mL): 3308 mL  Total OUT: 6078 mL    Total NET: -2874.1 mL      12-13 @ 07:01  -  12-13 @ 12:54  --------------------------------------------------------  IN:    Amiodarone: 66.8 mL    Cryoprecipitate: 150 mL    DOBUTamine: 22.4 mL    Enteral Tube Flush: 30 mL    freetext medication - Infusion: 87.3 mL    freetext medication - Infusion: 29.1 mL    Insulin: 12 mL    IV PiggyBack: 50 mL    Nepro with Carb Steady: 70 mL    Platelets - Single Donor: 225 mL  Total IN: 742.6 mL    OUT:    Bulb (mL): 70 mL    Bulb (mL): 170 mL    Indwelling Catheter - Urethral (mL): 0 mL    Other (mL): 863 mL  Total OUT: 1103 mL    Total NET: -360.4 mL          12-13    139  |  106  |  38[H]  ----------------------------<  137[H]  4.2   |  20[L]  |  0.89    Ca    9.3      13 Dec 2024 00:38  Phos  2.7     12-13  Mg     2.4     12-13    TPro  4.1[L]  /  Alb  3.6  /  TBili  14.0[H]  /  DBili  x   /  AST  41[H]  /  ALT  56[H]  /  AlkPhos  80  12-13    Meds: thiamine Injectable 100 milliGRAM(s) IV Push daily      HEMATOLOGIC  Meds:                         10.4   9.62  )-----------( 13       ( 13 Dec 2024 00:37 )             30.4     PT/INR - ( 13 Dec 2024 00:37 )   PT: 23.6 sec;   INR: 2.09 ratio         PTT - ( 13 Dec 2024 00:37 )  PTT:60.7 sec    INFECTIOUS DISEASES  T(C): 36.8 (12-13-24 @ 11:00), Max: 37.2 (12-12-24 @ 16:00)  Wt(kg): --  WBC Count: 9.62 K/uL (12-13 @ 00:37)    Recent Cultures:  Specimen Source: .Other, 12-12 @ 18:00; Results   Testing in progress; Gram Stain: --; Organism: --  Specimen Source: .Blood BLOOD, 12-07 @ 15:30; Results   No growth at 5 days; Gram Stain: --; Organism: --  Specimen Source: Body Fluid, 12-07 @ 11:18; Results   No growth at 5 days; Gram Stain:   polymorphonuclear leukocytes seen  No organisms seen  by cytocentrifuge; Organism: --  Specimen Source: .Blood BLOOD, 12-06 @ 13:30; Results   Growth in aerobic and anaerobic bottles: Escherichia coli  See previous culture [id number][!]; Gram Stain:   Growth in aerobic bottle: Gram Negative Rods  Growth in anaerobic bottle: Gram Negative Rods[!]; Organism: --  Specimen Source: .Blood BLOOD, 12-06 @ 13:25; Results   Growth in aerobic and anaerobic bottles: Escherichia coli  Direct identification is available within approximately 3-5  hours either by Blood Panel Multiplexed PCR or Direct  MALDI-TOF. Details: [email or web address]]; Gram Stain:   Growth in aerobic and anaerobic bottles: Gram Negative Rods[!]; Organism: Blood Culture PCR  Escherichia coli[!]    Meds: cefepime   IVPB      cefepime   IVPB 1000 milliGRAM(s) IV Intermittent every 8 hours  fluconAZOLE IVPB 400 milliGRAM(s) IV Intermittent every 24 hours  ganciclovir IVPB 120 milliGRAM(s) IV Intermittent every 24 hours  metroNIDAZOLE  IVPB 500 milliGRAM(s) IV Intermittent every 12 hours      ENDOCRINE  Capillary Blood Glucose    Meds: hydrocortisone sodium succinate Injectable 50 milliGRAM(s) IV Push every 12 hours  insulin regular Infusion 1 Unit(s)/Hr IV Continuous <Continuous>      ACCESS DEVICES:  [ ] Peripheral IV  [ X ] Central Venous Line		[ X ] R	[ X ] L	[ X ] IJ	[ ] Fem	[ ] SC	Placed:   [ ] Arterial Line			[ ] R	[ ] L	[ ] Fem	[ ] Rad	[ ] Ax	Placed:   [ ] PICC:					[ ] Mediport  [ X ] Urinary Catheter, Date Placed:   [ ] Necessity of urinary, arterial, and venous catheters discussed    OTHER MEDICATIONS:  chlorhexidine 0.12% Liquid 15 milliLiter(s) Oral Mucosa every 12 hours  chlorhexidine 2% Cloths 1 Application(s) Topical <User Schedule>  CRRT Treatment    <Continuous>  Phoxillum Filtration BK 4 / 2.5 5000 milliLiter(s) CRRT <Continuous>  PrismaSOL Filtration BGK 4 / 2.5 5000 milliLiter(s) CRRT <Continuous>  PrismaSOL Filtration BGK 4 / 2.5 5000 milliLiter(s) CRRT <Continuous>      IMAGING: HISTORY:  74 male w/ a PMHx of HTN, DM, AF, BPH, MASH cirrhosis and HCC s/p OLT on 11/15. Case was uneventful but post-op course has been prolonged and c/b delirium, aspiration, multiple episodes of acute hypoxic respiratory failure requiring reintubation from 11/20-11/24 & 11/24-11/26, AF w/ RVR, ileus requiring NGT, distended colon requiring rectal tube, dysphagia, malnutrition, hypernatremia, fevers, pancytopenia, poorly controlled glucoses, and left brachial VTE. Patient went into severe refractory septic shock on 12/6. Blood cultures were positive for E. coli. TTE revealed LVOT obstruction & TODD. Patient began complaining of severe abdominal pain and became increasingly tachypneic requiring intubation. Additionally, despite aggressive fluid resuscitation, patient continued to worsen so decision was made to take to the patient to the OR and rest of the hospital course is as follows:  12/7 - s/p exploratory laparotomy, total abdominal colectomy, end ileostomy, 3 intentionally retained laparotomy pads for bleeding, Abthera VAC placement, IABP placement w/ initiation of Jacqui & inotropic support w/ dobutamine, and right IJ Shiley placement w/ initiation of CRRT  12/8 - amiodarone gtt started for AF w/ RVR  12/9 - RTOR for nasoduodenal tube placement & abdominal closure  12/10 - IABP removed w/ TTE demonstrating improved biventricular function  12/11 - severe thrombocytopenia requiring multiple platelet transfusions, started trickle feeds  12/12 - attempt to wean Jacqui unsuccessful, downgraded caspofungin -> fluconazole and meropenem -> cefepime    Patient was transferred back to SICU on 12/13 for further management.    24 HOUR EVENTS:  -PA cath removed this AM  -tolerating CPAP on vent  -1u platelets, 5u cryo  -insulin gtt dc'd    NEURO  RASS (if intubated): 		CAM ICU (if concern for delirium):  Exam: following commands, intubated  Meds: oxyCODONE    IR 5 milliGRAM(s) Oral every 4 hours PRN Moderate Pain (4 - 6)      RESPIRATORY  RR: 17 (12-13-24 @ 11:00) (17 - 38)  SpO2: 97% (12-13-24 @ 12:02) (94% - 100%)  Wt(kg): --  Exam: CTA b/l  Mechanical Ventilation: Mode: CPAP with PS, RR (patient): 15, FiO2: 40, PEEP: 7, PS: 10, ITime: 1, MAP: 10, PIP: 17  ABG - ( 13 Dec 2024 05:26 )  pH: 7.48  /  pCO2: 34    /  pO2: 175   / HCO3: 25    / Base Excess: 2.0   /  SaO2: 99.3    Lactate: x                Meds:     CARDIOVASCULAR  HR: 100 (12-13-24 @ 12:02) (76 - 100)  BP: --  BP(mean): --  ABP: 139/59 (12-13-24 @ 11:00) (125/51 - 173/73)  ABP(mean): 89 (12-13-24 @ 11:00) (74 - 107)  Wt(kg): --  CVP(cm H2O): --      Exam: S1/S2 auscultated  Cardiac Rhythm: RRR  Perfusion     [ ]Adequate   [ ]Inadequate  Mentation   [ ]Normal       [ ]Reduced  Extremities  [ ]Warm         [ ]Cool  Volume Status [ ]Hypervolemic [ ]Euvolemic [ ]Hypovolemic  Meds: aMIOdarone Infusion 0.5 mG/Min IV Continuous <Continuous>  DOBUTamine Infusion 2 MICROgram(s)/kG/Min IV Continuous <Continuous>      GI/NUTRITION  Exam: soft, non-distended, NTTP  Diet: NPO w/ tube feed  Meds: pantoprazole  Injectable 40 milliGRAM(s) IV Push every 12 hours      GENITOURINARY  I&O's Detail    12-12 @ 07:01  -  12-13 @ 07:00  --------------------------------------------------------  IN:    Albumin 25%  -  50 mL: 500 mL    Amiodarone: 400.8 mL    Cryoprecipitate: 225 mL    DOBUTamine: 8.4 mL    DOBUTamine: 128.8 mL    Enteral Tube Flush: 20 mL    freetext medication - Infusion: 698.4 mL    Insulin: 56 mL    IV PiggyBack: 50 mL    IV PiggyBack: 650 mL    Nepro with Carb Steady: 240 mL    Platelets - Single Donor: 225 mL    Vasopressin: 1.5 mL  Total IN: 3203.9 mL    OUT:    Bulb (mL): 1000 mL    Bulb (mL): 1730 mL    Dexmedetomidine: 0 mL    Indwelling Catheter - Urethral (mL): 40 mL    Norepinephrine: 0 mL    Other (mL): 3308 mL  Total OUT: 6078 mL    Total NET: -2874.1 mL      12-13 @ 07:01  -  12-13 @ 12:54  --------------------------------------------------------  IN:    Amiodarone: 66.8 mL    Cryoprecipitate: 150 mL    DOBUTamine: 22.4 mL    Enteral Tube Flush: 30 mL    freetext medication - Infusion: 87.3 mL    freetext medication - Infusion: 29.1 mL    Insulin: 12 mL    IV PiggyBack: 50 mL    Nepro with Carb Steady: 70 mL    Platelets - Single Donor: 225 mL  Total IN: 742.6 mL    OUT:    Bulb (mL): 70 mL    Bulb (mL): 170 mL    Indwelling Catheter - Urethral (mL): 0 mL    Other (mL): 863 mL  Total OUT: 1103 mL    Total NET: -360.4 mL          12-13    139  |  106  |  38[H]  ----------------------------<  137[H]  4.2   |  20[L]  |  0.89    Ca    9.3      13 Dec 2024 00:38  Phos  2.7     12-13  Mg     2.4     12-13    TPro  4.1[L]  /  Alb  3.6  /  TBili  14.0[H]  /  DBili  x   /  AST  41[H]  /  ALT  56[H]  /  AlkPhos  80  12-13    Meds: thiamine Injectable 100 milliGRAM(s) IV Push daily      HEMATOLOGIC  Meds:                         10.4   9.62  )-----------( 13       ( 13 Dec 2024 00:37 )             30.4     PT/INR - ( 13 Dec 2024 00:37 )   PT: 23.6 sec;   INR: 2.09 ratio         PTT - ( 13 Dec 2024 00:37 )  PTT:60.7 sec    INFECTIOUS DISEASES  T(C): 36.8 (12-13-24 @ 11:00), Max: 37.2 (12-12-24 @ 16:00)  Wt(kg): --  WBC Count: 9.62 K/uL (12-13 @ 00:37)    Recent Cultures:  Specimen Source: .Other, 12-12 @ 18:00; Results   Testing in progress; Gram Stain: --; Organism: --  Specimen Source: .Blood BLOOD, 12-07 @ 15:30; Results   No growth at 5 days; Gram Stain: --; Organism: --  Specimen Source: Body Fluid, 12-07 @ 11:18; Results   No growth at 5 days; Gram Stain:   polymorphonuclear leukocytes seen  No organisms seen  by cytocentrifuge; Organism: --  Specimen Source: .Blood BLOOD, 12-06 @ 13:30; Results   Growth in aerobic and anaerobic bottles: Escherichia coli  See previous culture [id number][!]; Gram Stain:   Growth in aerobic bottle: Gram Negative Rods  Growth in anaerobic bottle: Gram Negative Rods[!]; Organism: --  Specimen Source: .Blood BLOOD, 12-06 @ 13:25; Results   Growth in aerobic and anaerobic bottles: Escherichia coli  Direct identification is available within approximately 3-5  hours either by Blood Panel Multiplexed PCR or Direct  MALDI-TOF. Details: [email or web address]]; Gram Stain:   Growth in aerobic and anaerobic bottles: Gram Negative Rods[!]; Organism: Blood Culture PCR  Escherichia coli[!]    Meds: cefepime   IVPB      cefepime   IVPB 1000 milliGRAM(s) IV Intermittent every 8 hours  fluconAZOLE IVPB 400 milliGRAM(s) IV Intermittent every 24 hours  ganciclovir IVPB 120 milliGRAM(s) IV Intermittent every 24 hours  metroNIDAZOLE  IVPB 500 milliGRAM(s) IV Intermittent every 12 hours      ENDOCRINE  Capillary Blood Glucose    Meds: hydrocortisone sodium succinate Injectable 50 milliGRAM(s) IV Push every 12 hours  insulin regular Infusion 1 Unit(s)/Hr IV Continuous <Continuous>      ACCESS DEVICES:  [ ] Peripheral IV  [ X ] Central Venous Line		[ X ] R	[ X ] L	[ X ] IJ	[ ] Fem	[ ] SC	Placed:   [ ] Arterial Line			[ ] R	[ ] L	[ ] Fem	[ ] Rad	[ ] Ax	Placed:   [ ] PICC:					[ ] Mediport  [ X ] Urinary Catheter, Date Placed:   [ ] Necessity of urinary, arterial, and venous catheters discussed    OTHER MEDICATIONS:  chlorhexidine 0.12% Liquid 15 milliLiter(s) Oral Mucosa every 12 hours  chlorhexidine 2% Cloths 1 Application(s) Topical <User Schedule>  CRRT Treatment    <Continuous>  Phoxillum Filtration BK 4 / 2.5 5000 milliLiter(s) CRRT <Continuous>  PrismaSOL Filtration BGK 4 / 2.5 5000 milliLiter(s) CRRT <Continuous>  PrismaSOL Filtration BGK 4 / 2.5 5000 milliLiter(s) CRRT <Continuous>      IMAGING:

## 2024-12-13 NOTE — PROGRESS NOTE ADULT - SUBJECTIVE AND OBJECTIVE BOX
Follow Up:      Interval History:    REVIEW OF SYSTEMS  [  ] ROS unobtainable because:    [  ] All other systems negative except as noted below    Constitutional:  [ ] fever [ ] chills  [ ] weight loss  [ ] weakness  Skin:  [ ] rash [ ] phlebitis	  Eyes: [ ] icterus [ ] pain  [ ] discharge	  ENMT: [ ] sore throat  [ ] thrush [ ] ulcers [ ] exudates  Respiratory: [ ] dyspnea [ ] hemoptysis [ ] cough [ ] sputum	  Cardiovascular:  [ ] chest pain [ ] palpitations [ ] edema	  Gastrointestinal:  [ ] nausea [ ] vomiting [ ] diarrhea [ ] constipation [ ] pain	  Genitourinary:  [ ] dysuria [ ] frequency [ ] hematuria [ ] discharge [ ] flank pain  [ ] incontinence  Musculoskeletal:  [ ] myalgias [ ] arthralgias [ ] arthritis  [ ] back pain  Neurological:  [ ] headache [ ] seizures  [ ] confusion/altered mental status    Allergies  No Known Allergies        ANTIMICROBIALS:  cefepime   IVPB    cefepime   IVPB 1000 every 8 hours  fluconAZOLE IVPB 400 every 24 hours  ganciclovir IVPB 120 every 24 hours  metroNIDAZOLE  IVPB 500 every 12 hours      OTHER MEDS:  MEDICATIONS  (STANDING):  aMIOdarone Infusion 0.5 <Continuous>  DOBUTamine Infusion 2 <Continuous>  hydrocortisone sodium succinate Injectable 50 every 12 hours  insulin regular Infusion 1 <Continuous>  oxyCODONE    IR 5 every 4 hours PRN  pantoprazole  Injectable 40 every 12 hours      Vital Signs Last 24 Hrs  T(C): 36.8 (13 Dec 2024 11:00), Max: 37.2 (12 Dec 2024 16:00)  T(F): 98.2 (13 Dec 2024 11:00), Max: 99 (12 Dec 2024 16:00)  HR: 100 (13 Dec 2024 12:02) (76 - 100)  BP: --  BP(mean): --  RR: 17 (13 Dec 2024 11:00) (17 - 38)  SpO2: 97% (13 Dec 2024 12:02) (94% - 100%)    Parameters below as of 13 Dec 2024 11:00  Patient On (Oxygen Delivery Method): ventilator    O2 Concentration (%): 40    PHYSICAL EXAMINATION:  General: Alert and Awake, NAD  HEENT: PERRL, EOMI  Neck: Supple  Cardiac: RRR, No M/R/G  Resp: CTAB, No Wh/Rh/Ra  Abdomen: NBS, NT/ND, No HSM, No rigidity or guarding  MSK: No LE edema. No Calf tenderness  : No nichols  Skin: No rashes or lesions. Skin is warm and dry to the touch.   Neuro: Alert and Awake. CN 2-12 Grossly intact. Moves all four extremities spontaneously.  Psych: Calm, Pleasant, Cooperative                          10.4   9.62  )-----------( 13       ( 13 Dec 2024 00:37 )             30.4       12-13    139  |  106  |  38[H]  ----------------------------<  137[H]  4.2   |  20[L]  |  0.89    Ca    9.3      13 Dec 2024 00:38  Phos  2.7     12-13  Mg     2.4     12-13    TPro  4.1[L]  /  Alb  3.6  /  TBili  14.0[H]  /  DBili  x   /  AST  41[H]  /  ALT  56[H]  /  AlkPhos  80  12-13      Urinalysis Basic - ( 13 Dec 2024 00:38 )    Color: x / Appearance: x / SG: x / pH: x  Gluc: 137 mg/dL / Ketone: x  / Bili: x / Urobili: x   Blood: x / Protein: x / Nitrite: x   Leuk Esterase: x / RBC: x / WBC x   Sq Epi: x / Non Sq Epi: x / Bacteria: x        MICROBIOLOGY:  v  .Other  12-12-24   Testing in progress  --  --      .Blood BLOOD  12-07-24   No growth at 5 days  --  --      Body Fluid  12-07-24   No growth at 5 days  --    polymorphonuclear leukocytes seen  No organisms seen  by cytocentrifuge      .Blood BLOOD  12-06-24   Growth in aerobic and anaerobic bottles: Escherichia coli  See previous culture [id number]  --    Growth in aerobic bottle: Gram Negative Rods  Growth in anaerobic bottle: Gram Negative Rods      .Blood BLOOD  12-06-24   Growth in aerobic and anaerobic bottles: Escherichia coli  Direct identification is available within approximately 3-5  hours either by Blood Panel Multiplexed PCR or Direct  MALDI-TOF. Details: [email or web address]  --  Blood Culture PCR  Escherichia coli      .Blood BLOOD  12-05-24   No growth at 5 days  --  --      .Blood BLOOD  12-05-24   No growth at 5 days  --  --      .Stool  12-01-24   No enteric pathogens isolated.  (Stool culture examined for Salmonella,  Shigella, Campylobacter, Aeromonas, Plesiomonas,  Vibrio, E.coli O157 and Yersinia)  --  --      .Blood BLOOD  11-26-24   No growth at 5 days  --  --      .Blood BLOOD  11-26-24   No growth at 5 days  --  --      Bronchial  11-24-24   Few Candida glabrata  --  Candida (Torulopsis) glabrata      .Blood BLOOD  11-24-24   No growth at 5 days  --  --      .Blood BLOOD  11-24-24   No growth at 5 days  --  --      Combi-Cath  11-23-24   Commensal charity consistent with body site  --    Moderate polymorphonuclear leukocytes per low power field  No squamous epithelial cells per low power field  No organisms seen      .Blood BLOOD  11-22-24   No growth at 5 days  --  --      .Blood BLOOD  11-20-24   No growth at 5 days  --  --      .Blood BLOOD  11-20-24   No growth at 5 days  --  --      Body Fluid  11-15-24   No growth at 5 days  --    No polymorphonuclear leukocytes seen  No organisms seen  by cytocentrifuge        Toxoplasma IgG Screen: 78.00 IU/mL (11-15-24 @ 00:41)  CMV IgG Antibody: 1.50 U/mL (11-15-24 @ 00:41)    CMVPCR Log: NotDetec Ybe46PZ/mL (12-11 @ 12:38)  CMVPCR Log: Det <1.54 Assay Dynamic Range: 34.5 to 1.0E+07 IU/mL (1.54 to 7.00 Log10 IU/mL)  Assay lower limit of quantification (LLOQ) is 34.5 IU/mL (1.54 Log10  IU/mL)  CMV DNA detected below the LLOQ will be reported as Detected < 34.5 IU/mL  (<1.54 Log10 IU/mL)  Nydia Cytomegalovirus (CMV) is an FDA-cleared quantitative test that  enables the detection and quantitation of CMV DNA in EDTA plasma of  infected transplant patients on the nydia 8800 system. This test was  verified by [employer]. Results should be interpreted  with consideration of all clinical findings and laboratory findings and  should not form the sole basis for a diagnosis or treatment decision. Jln11YS/mL (12-06 @ 15:31)        RADIOLOGY:    <The imaging below has been reviewed and visualized by me independently. Findings as detailed in report below> Follow Up:  e coli bacteremia    Interval History: remains on CRRT. afebrile.     REVIEW OF SYSTEMS  [ x ] ROS unobtainable because:  intubated  [  ] All other systems negative except as noted below    Constitutional:  [ ] fever [ ] chills  [ ] weight loss  [ ] weakness  Skin:  [ ] rash [ ] phlebitis	  Eyes: [ ] icterus [ ] pain  [ ] discharge	  ENMT: [ ] sore throat  [ ] thrush [ ] ulcers [ ] exudates  Respiratory: [ ] dyspnea [ ] hemoptysis [ ] cough [ ] sputum	  Cardiovascular:  [ ] chest pain [ ] palpitations [ ] edema	  Gastrointestinal:  [ ] nausea [ ] vomiting [ ] diarrhea [ ] constipation [ ] pain	  Genitourinary:  [ ] dysuria [ ] frequency [ ] hematuria [ ] discharge [ ] flank pain  [ ] incontinence  Musculoskeletal:  [ ] myalgias [ ] arthralgias [ ] arthritis  [ ] back pain  Neurological:  [ ] headache [ ] seizures  [ ] confusion/altered mental status    Allergies  No Known Allergies        ANTIMICROBIALS:  cefepime   IVPB    cefepime   IVPB 1000 every 8 hours  fluconAZOLE IVPB 400 every 24 hours  ganciclovir IVPB 120 every 24 hours  metroNIDAZOLE  IVPB 500 every 12 hours      OTHER MEDS:  MEDICATIONS  (STANDING):  aMIOdarone Infusion 0.5 <Continuous>  DOBUTamine Infusion 2 <Continuous>  hydrocortisone sodium succinate Injectable 50 every 12 hours  insulin regular Infusion 1 <Continuous>  oxyCODONE    IR 5 every 4 hours PRN  pantoprazole  Injectable 40 every 12 hours      Vital Signs Last 24 Hrs  T(C): 36.8 (13 Dec 2024 11:00), Max: 37.2 (12 Dec 2024 16:00)  T(F): 98.2 (13 Dec 2024 11:00), Max: 99 (12 Dec 2024 16:00)  HR: 100 (13 Dec 2024 12:02) (76 - 100)  BP: --  BP(mean): --  RR: 17 (13 Dec 2024 11:00) (17 - 38)  SpO2: 97% (13 Dec 2024 12:02) (94% - 100%)    Parameters below as of 13 Dec 2024 11:00  Patient On (Oxygen Delivery Method): ventilator    O2 Concentration (%): 40    PHYSICAL EXAMINATION:  General: Intubated and Sedated  HEENT: +ETT  Neck: Supple  Cardiac: RRR, No M/R/G  Resp: CTAB, No Wh/Rh/Ra  Abdomen: +ileostomy, dressing over surgical site, NBS, NT/ND, No HSM, No rigidity or guarding  MSK: No LE edema. No Calf tenderness  Skin: No rashes or lesions. Skin is warm and dry to the touch.   Neuro: Intubated and Sedated  : +Testicular and penile edema with erythema. Cool to touch without induration.   Psych: Unable to assess - intubated and sedated                          10.4   9.62  )-----------( 13       ( 13 Dec 2024 00:37 )             30.4       12-13    139  |  106  |  38[H]  ----------------------------<  137[H]  4.2   |  20[L]  |  0.89    Ca    9.3      13 Dec 2024 00:38  Phos  2.7     12-13  Mg     2.4     12-13    TPro  4.1[L]  /  Alb  3.6  /  TBili  14.0[H]  /  DBili  x   /  AST  41[H]  /  ALT  56[H]  /  AlkPhos  80  12-13      Urinalysis Basic - ( 13 Dec 2024 00:38 )    Color: x / Appearance: x / SG: x / pH: x  Gluc: 137 mg/dL / Ketone: x  / Bili: x / Urobili: x   Blood: x / Protein: x / Nitrite: x   Leuk Esterase: x / RBC: x / WBC x   Sq Epi: x / Non Sq Epi: x / Bacteria: x        MICROBIOLOGY:  v  .Other  12-12-24   Testing in progress  --  --      .Blood BLOOD  12-07-24   No growth at 5 days  --  --      Body Fluid  12-07-24   No growth at 5 days  --    polymorphonuclear leukocytes seen  No organisms seen  by cytocentrifuge      .Blood BLOOD  12-06-24   Growth in aerobic and anaerobic bottles: Escherichia coli  See previous culture [id number]  --    Growth in aerobic bottle: Gram Negative Rods  Growth in anaerobic bottle: Gram Negative Rods      .Blood BLOOD  12-06-24   Growth in aerobic and anaerobic bottles: Escherichia coli  Direct identification is available within approximately 3-5  hours either by Blood Panel Multiplexed PCR or Direct  MALDI-TOF. Details: [email or web address]  --  Blood Culture PCR  Escherichia coli      .Blood BLOOD  12-05-24   No growth at 5 days  --  --      .Blood BLOOD  12-05-24   No growth at 5 days  --  --      .Stool  12-01-24   No enteric pathogens isolated.  (Stool culture examined for Salmonella,  Shigella, Campylobacter, Aeromonas, Plesiomonas,  Vibrio, E.coli O157 and Yersinia)  --  --      .Blood BLOOD  11-26-24   No growth at 5 days  --  --      .Blood BLOOD  11-26-24   No growth at 5 days  --  --      Bronchial  11-24-24   Few Candida glabrata  --  Candida (Torulopsis) glabrata      .Blood BLOOD  11-24-24   No growth at 5 days  --  --      .Blood BLOOD  11-24-24   No growth at 5 days  --  --      Combi-Cath  11-23-24   Commensal charity consistent with body site  --    Moderate polymorphonuclear leukocytes per low power field  No squamous epithelial cells per low power field  No organisms seen      .Blood BLOOD  11-22-24   No growth at 5 days  --  --      .Blood BLOOD  11-20-24   No growth at 5 days  --  --      .Blood BLOOD  11-20-24   No growth at 5 days  --  --      Body Fluid  11-15-24   No growth at 5 days  --    No polymorphonuclear leukocytes seen  No organisms seen  by cytocentrifuge        Toxoplasma IgG Screen: 78.00 IU/mL (11-15-24 @ 00:41)  CMV IgG Antibody: 1.50 U/mL (11-15-24 @ 00:41)    CMVPCR Log: NotDetec Qmo64CR/mL (12-11 @ 12:38)  CMVPCR Log: Det <1.54 Assay Dynamic Range: 34.5 to 1.0E+07 IU/mL (1.54 to 7.00 Log10 IU/mL)  Assay lower limit of quantification (LLOQ) is 34.5 IU/mL (1.54 Log10  IU/mL)  CMV DNA detected below the LLOQ will be reported as Detected < 34.5 IU/mL  (<1.54 Log10 IU/mL)  Nydia Cytomegalovirus (CMV) is an FDA-cleared quantitative test that  enables the detection and quantitation of CMV DNA in EDTA plasma of  infected transplant patients on the nydia 8800 system. This test was  verified by [facility]. Results should be interpreted  with consideration of all clinical findings and laboratory findings and  should not form the sole basis for a diagnosis or treatment decision. Spu32ZM/mL (12-06 @ 15:31)        RADIOLOGY:    <The imaging below has been reviewed and visualized by me independently. Findings as detailed in report below>    < from: Xray Chest 1 View- PORTABLE-Urgent (Xray Chest 1 View- PORTABLE-Urgent .) (12.13.24 @ 13:39) >  IMPRESSION:  Enteric tube courses through the stomach, crosses midline, tip likely   within distal stomach/proximal duodenum.    Interval decrease in bilateral effusions, nowtrace. Stable mild   pulmonary congestive changes.    < end of copied text >

## 2024-12-13 NOTE — CONSULT NOTE ADULT - ATTENDING COMMENTS
Seen and examined, agree with above. 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 Presentation c/w improving post-surgical ileus. Continue bowel regimen, remainder of recs as above.
post liver transplant (HCC) arrived in SICU intubated. briefly upon arrival hypotensive, per report from OR was under resuscitated    precedex 0.5, wakes up and follows command w all 4  PRVC, good gas exchange  SBT now  CXR appears non acute  PAC 30/12, CVP 6  normotensive after fluid boluses  clear lact  NPO now, g tube in place minimal non bloody output  JPs outputs appropriate  LFTs post op reviewed  protonix IV for now  duplex US trace collection, patent vessels  nichols in place, 50cc/hr uop since OR  hypokalemia, repleted  plasmalyte 125/hr  slight drift in Hb, will continue to monitor  no chem VTE PPX, SCDs on  unasyn periop  transplant ppx   steroids  ins drip @ 3
post op ileus  less distended  hypoactive bowel sounds  no role for colonoscopy decompression at this time  continue rectal tube
Edema bullae leading to ulceration in the setting of anasarca. Treating the underlying edema will be critical to prevention/treatment of these lesions. Anti-inflammatory treatments tend not to help. Recommend careful wound care as described above, and monitoring for infections. New edema bullae (blisters) are likely to develop until the underlying edema is resolved. Large sheets of skin separation may occur on the back in some cases. Please monitor this region and turn the patient only when necessary to prevent additional trauma to the skin.    West Escalera MD, PharmD, MPH  Co-Director, Inpatient Dermatology Consultation Service, [facility]
Patient seen in SICU on hospital day 29. Physical examination noted above showing him to be chronically ill on life support oral intubation , colostomy present and under treatment for shock liver post hepatic transplantation (treatment of GONZALES cirrhosis and HCC-prior history of two treatments with Y 90). The patient ECOG PS is 4 ; KPS 30%. He is not responding to questions, he has anasarca mild abdominal distension and bilateral peripheral edema likely reflecting hypoalbuminemia.  We are consulted on the use of plasma factors blood product support and thrombocytopenia.  His son a vascular surgeon in [facility] provides the history. Dr Davison is the former  of surgery at [employer] in [place] (retired Urological surgeon). The patient's  brother is personally known to me.  Review of the peripheral blood smear shows transfused red cells admixed with target cells and occasional red cell membrane abnormality. Rare (less that 1/200 cells seen are schizocytes) White cell morphology is predominately normal neutrophil. The platelet count is severely depressed.  My impression is that platelet lowering is due to multiple factors. I cannot reliably distinguish DIC with platelet lowering from sepsis related thrombocytopenia (he had E Coli bacteremia) versus abnormal coagulation including low platelets seen in severe hepatic dysfunction.  Both of the conditions of hepatic failure and sepsis may induce thrombocytopenia. I would expect more schizocytes to be seen in DIC  or in thrombocytopenia induced by intravascular balloon counter pulsation (discontinued earlier this week).  The treatment is supportive with plasma product and platelet transfusion. I do not advise plasma exchange unless the findings of TMA were present. In truth the finding so sepsis and liver failure may be difficult to distinguish as the wendi ways of disease merge.  Goals of care have to be considered if no improvement in his condition occurs given his age and liver dysfunction.
oliguric MORAIMA with worsening metabolic acidosis. consented for CRRT
seen and examined  edematous scrotum, likely secondary to third spacing / dependency of scrotum  some overlying skin breakdown / sloughing  agree w/ local wound care, scrotal elevation  Scrotal US  No interventions planned at this time

## 2024-12-14 NOTE — PROGRESS NOTE ADULT - SUBJECTIVE AND OBJECTIVE BOX
Follow Up:  e coli bacteremia    Interval History: remains on CRRT. afebrile.   Persistent scrotal edema  WBC 11.53, platelets of 22    Vital Signs Last 24 Hrs  T(C): 36.5 (14 Dec 2024 11:00), Max: 37 (14 Dec 2024 03:00)  T(F): 97.7 (14 Dec 2024 11:00), Max: 98.6 (14 Dec 2024 03:00)  HR: 95 (14 Dec 2024 11:15) (87 - 109)  BP: 170/79 (13 Dec 2024 12:00) (170/79 - 170/79)  BP(mean): 113 (13 Dec 2024 12:00) (113 - 113)  RR: 27 (14 Dec 2024 11:00) (14 - 27)  SpO2: 99% (14 Dec 2024 11:15) (93% - 99%)    Parameters below as of 14 Dec 2024 11:15  Patient On (Oxygen Delivery Method): ventilator      PHYSICAL EXAMINATION:  General: Intubated and Sedated  HEENT: +ETT  Neck: Supple  Cardiac: RRR, No M/R/G  Resp: CTAB, No Wh/Rh/Ra  Abdomen: +ileostomy, dressing over surgical site, NBS, NT/ND, No HSM, No rigidity or guarding  MSK: No LE edema. No Calf tenderness  Skin: No rashes or lesions. Skin is warm and dry to the touch.   Neuro: Intubated and Sedated  : +Testicular and penile edema with erythema. Cool to touch without induration.   Psych: Unable to assess - intubated and sedated                          10.4   9.62  )-----------( 13       ( 13 Dec 2024 00:37 )             30.4       12-13    139  |  106  |  38[H]  ----------------------------<  137[H]  4.2   |  20[L]  |  0.89    Ca    9.3      13 Dec 2024 00:38  Phos  2.7     12-13  Mg     2.4     12-13    TPro  4.1[L]  /  Alb  3.6  /  TBili  14.0[H]  /  DBili  x   /  AST  41[H]  /  ALT  56[H]  /  AlkPhos  80  12-13      Urinalysis Basic - ( 13 Dec 2024 00:38 )    Color: x / Appearance: x / SG: x / pH: x  Gluc: 137 mg/dL / Ketone: x  / Bili: x / Urobili: x   Blood: x / Protein: x / Nitrite: x   Leuk Esterase: x / RBC: x / WBC x   Sq Epi: x / Non Sq Epi: x / Bacteria: x        MICROBIOLOGY:  v  .Other  12-12-24   Testing in progress  --  --      .Blood BLOOD  12-07-24   No growth at 5 days  --  --      Body Fluid  12-07-24   No growth at 5 days  --    polymorphonuclear leukocytes seen  No organisms seen  by cytocentrifuge      .Blood BLOOD  12-06-24   Growth in aerobic and anaerobic bottles: Escherichia coli  See previous culture [id number]  --    Growth in aerobic bottle: Gram Negative Rods  Growth in anaerobic bottle: Gram Negative Rods      .Blood BLOOD  12-06-24   Growth in aerobic and anaerobic bottles: Escherichia coli  Direct identification is available within approximately 3-5  hours either by Blood Panel Multiplexed PCR or Direct  MALDI-TOF. Details: [email or web address]  --  Blood Culture PCR  Escherichia coli      .Blood BLOOD  12-05-24   No growth at 5 days  --  --      .Blood BLOOD  12-05-24   No growth at 5 days  --  --      .Stool  12-01-24   No enteric pathogens isolated.  (Stool culture examined for Salmonella,  Shigella, Campylobacter, Aeromonas, Plesiomonas,  Vibrio, E.coli O157 and Yersinia)  --  --      .Blood BLOOD  11-26-24   No growth at 5 days  --  --      .Blood BLOOD  11-26-24   No growth at 5 days  --  --      Bronchial  11-24-24   Few Candida glabrata  --  Candida (Torulopsis) glabrata      .Blood BLOOD  11-24-24   No growth at 5 days  --  --      .Blood BLOOD  11-24-24   No growth at 5 days  --  --      Combi-Cath  11-23-24   Commensal charity consistent with body site  --    Moderate polymorphonuclear leukocytes per low power field  No squamous epithelial cells per low power field  No organisms seen      .Blood BLOOD  11-22-24   No growth at 5 days  --  --      .Blood BLOOD  11-20-24   No growth at 5 days  --  --      .Blood BLOOD  11-20-24   No growth at 5 days  --  --      Body Fluid  11-15-24   No growth at 5 days  --    No polymorphonuclear leukocytes seen  No organisms seen  by cytocentrifuge        Toxoplasma IgG Screen: 78.00 IU/mL (11-15-24 @ 00:41)  CMV IgG Antibody: 1.50 U/mL (11-15-24 @ 00:41)    CMVPCR Log: NotDetec Wuc39IV/mL (12-11 @ 12:38)  CMVPCR Log: Det <1.54 Assay Dynamic Range: 34.5 to 1.0E+07 IU/mL (1.54 to 7.00 Log10 IU/mL)  Assay lower limit of quantification (LLOQ) is 34.5 IU/mL (1.54 Log10  IU/mL)  CMV DNA detected below the LLOQ will be reported as Detected < 34.5 IU/mL  (<1.54 Log10 IU/mL)  Nydia Cytomegalovirus (CMV) is an FDA-cleared quantitative test that  enables the detection and quantitation of CMV DNA in EDTA plasma of  infected transplant patients on the nydia 8800 system. This test was  verified by [employer]. Results should be interpreted  with consideration of all clinical findings and laboratory findings and  should not form the sole basis for a diagnosis or treatment decision. Qev11RF/mL (12-06 @ 15:31)        RADIOLOGY:    <The imaging below has been reviewed and visualized by me independently. Findings as detailed in report below>    < from: Xray Chest 1 View- PORTABLE-Urgent (Xray Chest 1 View- PORTABLE-Urgent .) (12.13.24 @ 13:39) >  IMPRESSION:  Enteric tube courses through the stomach, crosses midline, tip likely   within distal stomach/proximal duodenum.    Interval decrease in bilateral effusions, nowtrace. Stable mild   pulmonary congestive changes.    < end of copied text >

## 2024-12-14 NOTE — PROGRESS NOTE ADULT - NS ATTEND AMEND GEN_ALL_CORE FT
POD#5 s/p creation ileostomy and closure of abdomen; 4 weeks s/p OLT  Transaminases continue to improve, TBili slightly improved at 16  lactate stable at 2  remains intubated on low dose dobutamine and amiodarone. Aortic balloon pump removed  echo showing improvement in EF. cont to monitor  wean off dobutamine  Immunosuppression - Cyclosporine and cellcept held. Cont low dose hydrocortisone  +ileostomy functioning. Increase rate of tube feeds to goal  sepsis improving but remains critically ill. cont abx as per ID, f/u cultures and pathology.  plan for CT today of head, chest, abd/pel and scrotum to r/o abscess  Cont VAC over abdominal wound. Cont albumin replacement for large volume ascites drainage.  Ca and bicarb replacement  Cont CVVH as per nephrology. remains oliguric  thrombocytopenia likely from sepsis and medication, Linezolid and ganciclovir held. Transfuse platelets as needed

## 2024-12-14 NOTE — PROGRESS NOTE ADULT - ASSESSMENT
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     s/p OLT 11/15 with complicated post op course    [] Septic shock/Cardiogenic shock  [] s/p Colectomy 12/7 POD#7  [] RTOR for closure and Ileostomy creation   [] IAPB 12/7- removed 12/10: CTICU on board  - E coli Bacteremia (12/6) - on jeniffer/caspo -> switched to  cefepime/ flagyl/fluc Received Tobra x 1 12/6 .  - Fluc 400mg   - Neutropenia: received Neupogen 480mcg x2  - MORAIMA: CRRT started 12/7, low u/o  - solucortef q12, dc florinef  - trend lactate  - echo 12/11    [] s/p OLT POD #29  - trend LFT's  - Diet: TF    - Pain management: avoid narcotics  - Strict I&Os, nichols, wound vac placed 12/10   - US: L brachial DVT   - wound vac exchange for ileostomy (12/13)    [ ] Immunosuppression  - Tacrolimus stopped 11/18 in setting of AMS/Seizure, Started Cyclo 11/24  - Cyclo held, MMF held, on stress dose steroids  - PPx: Gancyclovir, bactrim    [] lleus   -@ home on Linzess  - imaging with distended colon (likely opioid induced)  - s/p Neostigmine x 2  - s/p Relistor, last dose 12/1  - s/p colectomy with end ileostomy  (see above)  - Daily AXR     [] CMV viremia  - currently on Gancyclovir   - CMV PCR (12/11): neg    [ ] AMS  - Reintubated 11/20 Aspiration/hypoxia, extubated 11/24->[id number]--> extubated 11/26 -> [id number]  - EEG 11/30 negative, Neurology following  - BH following   - off tacro  - on keppra    [ ] HTN/ pAFib  - On Amiodarone  - metoprolol held (hypotensive)   - Eliquis 5mg bid - held 12/6.   - Dr. 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     s/p OLT 11/15 with complicated post op course    [] Septic shock/Cardiogenic shock  [] s/p Colectomy 12/7 POD#7  [] RTOR for closure and Ileostomy creation   [] IAPB 12/7- removed 12/10: CTICU on board  - E coli Bacteremia (12/6) - on jeniffer/caspo -> switched to  cefepime/ flagyl/fluc Received Tobra x 1 12/6 . Continue with cefepime due to leukocytosis.  - Fluc 400mg   - Neutropenia: received Neupogen 480mcg x2  - MORAIMA: CRRT started 12/7, low u/o  - solucortef q12, dc florinef  - trend lactate  - echo 12/11    [] s/p OLT POD #29  - trend LFT's  - Diet: increase TFs as needed  - Pain management: avoid narcotics  - Strict I&Os, nichols, wound vac placed 12/10   - US: L brachial DVT   - wound vac exchange for ileostomy (12/13)  - Start Albumin 500 q8h  - HIT panel neg (12/14)    [ ] Immunosuppression  - Tacrolimus stopped 11/18 in setting of AMS/Seizure, Started Cyclo 11/24  - Cyclo held, MMF held, on stress dose steroids  - PPx: Gancyclovir (HELD), bactrim (HELD) in setting of thrombocytopenia    [] lleus   -@ home on Linzess  - imaging with distended colon (likely opioid induced)  - s/p Neostigmine x 2  - s/p Relistor, last dose 12/1  - s/p colectomy with end ileostomy  (see above)  - Daily AXR     [] CMV viremia  - d/c gancylovir due to thrombocytopenia  - CMV PCR (12/11): neg    [ ] AMS  - Reintubated 11/20 Aspiration/hypoxia, extubated 11/24->[id number]--> extubated 11/26 -> [id number]  - EEG 11/30 negative, Neurology following  - BH following   - off tacro  - on keppra    [ ] HTN/ pAFib  - On Amiodarone  - metoprolol held (hypotensive)   - Eliquis 5mg bid - held 12/6.   - Dr. Quintanilla following    [ ] DM  - ISS     []Scrotal abscess   - US (12/12): 2.1 x0.9 x 3.2 cm focal, right testicle- possible abscess (12/12)  -Urology c/s recs nothing to do, repeat ultrasound

## 2024-12-14 NOTE — PROGRESS NOTE ADULT - SUBJECTIVE AND OBJECTIVE BOX
Transplant Surgery - Multidisciplinary Rounds  --------------------------------------------------------------  OLT   11/15/2024        POD#29  Colectomy 12/7/2024   POD#7  IABP 12/7 removed 12/10    Present:   Patient seen and examined with multidisciplinary Transplant team including Surgeon: Dr. Palomino, Dr. Sorto, JAZZ Vegas, Hepatologist: Dr. Malagon and ICU team in AM rounds.   Disciplines not in attendance will be notified of the plan.     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     s/p OLT 11/15 with post op course c/b:  ·	Hypoxia: Reintubated 11/20, extubated 11/24->reintubated 11/24, extubated 11/26, reintubated 12/7  ·	Ileus   ·	Fever  ·	A fib  ·	AMS/Seizure   ·	L brachial DVT  ·	Neutropenia  ·	E coli Bacteremia (12/6)  ·	s/p Coloctomy 12/7.   ·	IABP 12/7, removed 12/10    Interval/Overnight Events:   - afebrile, off pressors  - on amio/dobut  - CRRT  - scrotal us concerning for scrotal wall abcess    Immunosuppression:  -Cyclo (held), MMF held this am, on stress dose steroids  -ongoing monitoring for signs of rejection    Potential Discharge date: Pending clinical course  Education: Medications  Plan of care: See Below    TX DATA     ROS: Unable to assess patient is intubated, sedated    PHYSICAL EXAM:   Constitutional: intubated, sedated   Eyes:  PERRLA  ENMT: nc/at, no thrush   Neck: supple   Respiratory: CTA B/L  Cardiovascular: RRR  Gastrointestinal: incision clean/dry/intact + Ostomy pink   Genitourinary: Castro in place   Extremities: SCD's in place and working bilaterally, + LE edema  Neurological: intubated, sedated  Skin: no rashes, ulcerations, lesions Transplant Surgery - Multidisciplinary Rounds  --------------------------------------------------------------  OLT   11/15/2024        POD#29  Colectomy 12/7/2024   POD#7  IABP 12/7 removed 12/10    Present:   Patient seen and examined with multidisciplinary Transplant team including Surgeon: Dr. Palomino, Dr. Sorto, JAZZ Vegas, Hepatologist: Dr. Malagon and ICU team in AM rounds.   Disciplines not in attendance will be notified of the plan.     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     s/p OLT 11/15 with post op course c/b:  ·	Hypoxia: Reintubated 11/20, extubated 11/24->reintubated 11/24, extubated 11/26, reintubated 12/7  ·	Ileus   ·	Fever  ·	A fib  ·	AMS/Seizure   ·	L brachial DVT  ·	Neutropenia  ·	E coli Bacteremia (12/6)  ·	s/p Coloctomy 12/7.   ·	IABP 12/7, removed 12/10    Interval/Overnight Events:   - afebrile, off pressors  - on amio/dobut 2  - CRRT  - decreasing scrotal edema     Immunosuppression:  -Cyclo (held), MMF held this am, on stress dose steroids  -ongoing monitoring for signs of rejection    Potential Discharge date: Pending clinical course  Education: Medications  Plan of care: See Below    MEDICATIONS  (STANDING):  albumin human  5% IVPB 500 milliLiter(s) IV Intermittent every 8 hours  aMIOdarone Infusion 0.25 mG/Min (8.33 mL/Hr) IV Continuous <Continuous>  buDESOnide    Inhalation Suspension 0.5 milliGRAM(s) Inhalation every 12 hours  cefepime   IVPB      cefepime   IVPB 1000 milliGRAM(s) IV Intermittent every 8 hours  chlorhexidine 0.12% Liquid 15 milliLiter(s) Oral Mucosa every 12 hours  chlorhexidine 2% Cloths 1 Application(s) Topical <User Schedule>  CRRT Treatment    <Continuous>  DOBUTamine Infusion 2 MICROgram(s)/kG/Min (5.62 mL/Hr) IV Continuous <Continuous>  fluconAZOLE IVPB 400 milliGRAM(s) IV Intermittent every 24 hours  hydrocortisone sodium succinate Injectable 50 milliGRAM(s) IV Push every 12 hours  insulin lispro (ADMELOG) corrective regimen sliding scale   SubCutaneous every 6 hours  metroNIDAZOLE  IVPB 500 milliGRAM(s) IV Intermittent every 12 hours  nystatin Powder 1 Application(s) Topical every 12 hours  pantoprazole  Injectable 40 milliGRAM(s) IV Push daily  Phoxillum Filtration BK 4 / 2.5 5000 milliLiter(s) (1500 mL/Hr) CRRT <Continuous>  PrismaSOL Filtration BGK 4 / 2.5 5000 milliLiter(s) (1250 mL/Hr) CRRT <Continuous>  PrismaSOL Filtration BGK 4 / 2.5 5000 milliLiter(s) (250 mL/Hr) CRRT <Continuous>  sodium chloride 3%  Inhalation 4 milliLiter(s) Inhalation every 6 hours    MEDICATIONS  (PRN):  oxyCODONE    Solution 5 milliGRAM(s) Enteral Tube every 4 hours PRN Moderate to Severe Pain (4 - 10)    PAST MEDICAL & SURGICAL HISTORY:  Diabetes    Transaminitis    Paroxysmal atrial fibrillation    Depression    BPH (benign prostatic hyperplasia)    Hypertension    Chronic atrial fibrillation    Coronary artery disease    Hepatocellular carcinoma    DM (diabetes mellitus)    HTN (hypertension)    Paroxysmal atrial fibrillation    Cirrhosis    HCC (hepatocellular carcinoma)    History of BPH    History of laparoscopic cholecystectomy    History of lumbar laminectomy    H/O prior ablation treatment    H/O percutaneous left heart catheterization    Vital Signs Last 24 Hrs  T(C): 36.5 (14 Dec 2024 11:00), Max: 37 (14 Dec 2024 03:00)  T(F): 97.7 (14 Dec 2024 11:00), Max: 98.6 (14 Dec 2024 03:00)  HR: 99 (14 Dec 2024 12:00) (87 - 109)  BP: --  BP(mean): --  RR: 26 (14 Dec 2024 12:00) (14 - 27)  SpO2: 99% (14 Dec 2024 12:00) (93% - 99%)    Parameters below as of 14 Dec 2024 11:15  Patient On (Oxygen Delivery Method): ventilator    I&O's Summary    13 Dec 2024 07:01  -  14 Dec 2024 07:00  --------------------------------------------------------  IN: 2619 mL / OUT: 5030 mL / NET: -2411 mL    14 Dec 2024 07:01  -  14 Dec 2024 13:55  --------------------------------------------------------  IN: 469.5 mL / OUT: 555 mL / NET: -85.5 mL                          10.6   14.97 )-----------( 25       ( 14 Dec 2024 12:22 )             32.1     12-14    138  |  105  |  39[H]  ----------------------------<  188[H]  4.6   |  18[L]  |  0.78    Ca    7.4[L]      14 Dec 2024 12:22  Phos  3.0     12-14  Mg     2.3     12-14    TPro  3.9[L]  /  Alb  3.2[L]  /  TBili  17.6[H]  /  DBili  x   /  AST  45[H]  /  ALT  61[H]  /  AlkPhos  140[H]  12-14    Culture - Fungal, Other (collected 12-12-24 @ 18:00)  Source: .Other  Preliminary Report (12-13-24 @ 11:29):    Testing in progress    Culture - Blood (collected 12-07-24 @ 15:30)  Source: .Blood BLOOD  Final Report (12-12-24 @ 19:00):    No growth at 5 days    ROS: Unable to assess patient is intubated, sedated    PHYSICAL EXAM:   Constitutional: intubated, sedated   Eyes:  PERRLA  ENMT: nc/at, no thrush   Neck: supple   Respiratory: CTA B/L  Cardiovascular: RRR  Gastrointestinal: incision clean/dry/intact + Ostomy pink   Genitourinary: Castro in place   Extremities: SCD's in place and working bilaterally, + LE edema  Neurological: intubated, sedated  Skin: no rashes, ulcerations, lesions Transplant Surgery - Multidisciplinary Rounds  --------------------------------------------------------------  OLT   11/15/2024        POD#29  Colectomy 12/7/2024   POD#7  IABP 12/7 removed 12/10    Present:   Patient seen and examined with multidisciplinary Transplant team including Surgeon: Dr. Palomino, Dr. Sorto, JAZZ Vegas, Hepatologist: Dr. Malagon and ICU team in AM rounds.   Disciplines not in attendance will be notified of the plan.     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     s/p OLT 11/15 with post op course c/b:  ·	Hypoxia: Reintubated 11/20, extubated 11/24->reintubated 11/24, extubated 11/26, reintubated 12/7  ·	Ileus   ·	Fever  ·	A fib  ·	AMS/Seizure   ·	L brachial DVT  ·	Neutropenia  ·	E coli Bacteremia (12/6)  ·	s/p Coloctomy 12/7.   ·	IABP 12/7, removed 12/10    Interval/Overnight Events:   - afebrile, off pressors  - on amio/dobut 2  - CRRT  - decreasing scrotal edema   - leukocytosis on cefepime    Immunosuppression:  -Cyclo (held), MMF held this am, on stress dose steroids  -ongoing monitoring for signs of rejection    Potential Discharge date: Pending clinical course  Education: Medications  Plan of care: See Below    MEDICATIONS  (STANDING):  albumin human  5% IVPB 500 milliLiter(s) IV Intermittent every 8 hours  aMIOdarone Infusion 0.25 mG/Min (8.33 mL/Hr) IV Continuous <Continuous>  buDESOnide    Inhalation Suspension 0.5 milliGRAM(s) Inhalation every 12 hours  cefepime   IVPB      cefepime   IVPB 1000 milliGRAM(s) IV Intermittent every 8 hours  chlorhexidine 0.12% Liquid 15 milliLiter(s) Oral Mucosa every 12 hours  chlorhexidine 2% Cloths 1 Application(s) Topical <User Schedule>  CRRT Treatment    <Continuous>  DOBUTamine Infusion 2 MICROgram(s)/kG/Min (5.62 mL/Hr) IV Continuous <Continuous>  fluconAZOLE IVPB 400 milliGRAM(s) IV Intermittent every 24 hours  hydrocortisone sodium succinate Injectable 50 milliGRAM(s) IV Push every 12 hours  insulin lispro (ADMELOG) corrective regimen sliding scale   SubCutaneous every 6 hours  metroNIDAZOLE  IVPB 500 milliGRAM(s) IV Intermittent every 12 hours  nystatin Powder 1 Application(s) Topical every 12 hours  pantoprazole  Injectable 40 milliGRAM(s) IV Push daily  Phoxillum Filtration BK 4 / 2.5 5000 milliLiter(s) (1500 mL/Hr) CRRT <Continuous>  PrismaSOL Filtration BGK 4 / 2.5 5000 milliLiter(s) (1250 mL/Hr) CRRT <Continuous>  PrismaSOL Filtration BGK 4 / 2.5 5000 milliLiter(s) (250 mL/Hr) CRRT <Continuous>  sodium chloride 3%  Inhalation 4 milliLiter(s) Inhalation every 6 hours    MEDICATIONS  (PRN):  oxyCODONE    Solution 5 milliGRAM(s) Enteral Tube every 4 hours PRN Moderate to Severe Pain (4 - 10)    PAST MEDICAL & SURGICAL HISTORY:  Diabetes    Transaminitis    Paroxysmal atrial fibrillation    Depression    BPH (benign prostatic hyperplasia)    Hypertension    Chronic atrial fibrillation    Coronary artery disease    Hepatocellular carcinoma    DM (diabetes mellitus)    HTN (hypertension)    Paroxysmal atrial fibrillation    Cirrhosis    HCC (hepatocellular carcinoma)    History of BPH    History of laparoscopic cholecystectomy    History of lumbar laminectomy    H/O prior ablation treatment    H/O percutaneous left heart catheterization    Vital Signs Last 24 Hrs  T(C): 36.5 (14 Dec 2024 11:00), Max: 37 (14 Dec 2024 03:00)  T(F): 97.7 (14 Dec 2024 11:00), Max: 98.6 (14 Dec 2024 03:00)  HR: 99 (14 Dec 2024 12:00) (87 - 109)  BP: --  BP(mean): --  RR: 26 (14 Dec 2024 12:00) (14 - 27)  SpO2: 99% (14 Dec 2024 12:00) (93% - 99%)    Parameters below as of 14 Dec 2024 11:15  Patient On (Oxygen Delivery Method): ventilator    I&O's Summary    13 Dec 2024 07:01  -  14 Dec 2024 07:00  --------------------------------------------------------  IN: 2619 mL / OUT: 5030 mL / NET: -2411 mL    14 Dec 2024 07:01  -  14 Dec 2024 13:55  --------------------------------------------------------  IN: 469.5 mL / OUT: 555 mL / NET: -85.5 mL                          10.6   14.97 )-----------( 25       ( 14 Dec 2024 12:22 )             32.1     12-14    138  |  105  |  39[H]  ----------------------------<  188[H]  4.6   |  18[L]  |  0.78    Ca    7.4[L]      14 Dec 2024 12:22  Phos  3.0     12-14  Mg     2.3     12-14    TPro  3.9[L]  /  Alb  3.2[L]  /  TBili  17.6[H]  /  DBili  x   /  AST  45[H]  /  ALT  61[H]  /  AlkPhos  140[H]  12-14    Culture - Fungal, Other (collected 12-12-24 @ 18:00)  Source: .Other  Preliminary Report (12-13-24 @ 11:29):    Testing in progress    Culture - Blood (collected 12-07-24 @ 15:30)  Source: .Blood BLOOD  Final Report (12-12-24 @ 19:00):    No growth at 5 days    ROS: Unable to assess patient is intubated, sedated    PHYSICAL EXAM:   Constitutional: intubated, sedated   Eyes:  PERRLA  ENMT: nc/at, no thrush   Neck: supple   Respiratory: CTA B/L  Cardiovascular: RRR  Gastrointestinal: incision clean/dry/intact + Ostomy pink   Genitourinary: Castro in place   Extremities: SCD's in place and working bilaterally, + LE edema  Neurological: intubated, sedated  Skin: no rashes, ulcerations, lesions Transplant Surgery - Multidisciplinary Rounds  --------------------------------------------------------------  OLT   11/15/2024        POD#29  Colectomy 12/7/2024   POD#7  IABP 12/7 removed 12/10    Present:   Patient seen and examined with multidisciplinary Transplant team including Surgeon: Dr. Palomino, Dr. Sorto, JAZZ Vegas, Hepatologist: Dr. Malagon and ICU team in AM rounds.   Disciplines not in attendance will be notified of the plan.     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     s/p OLT 11/15 with post op course c/b:  ·	Hypoxia: Reintubated 11/20, extubated 11/24->reintubated 11/24, extubated 11/26, reintubated 12/7  ·	Ileus   ·	Fever  ·	A fib  ·	AMS/Seizure   ·	L brachial DVT  ·	Neutropenia  ·	E coli Bacteremia (12/6)  ·	s/p Coloctomy 12/7.   ·	IABP 12/7, removed 12/10    Interval/Overnight Events:   - afebrile, off pressors, off NO  - on amio/dobut 2  - on CRRT  - decreasing scrotal edema   - leukocytosis    Immunosuppression:  -Cyclo (held), MMF held this am, on stress dose steroids  -ongoing monitoring for signs of rejection    Potential Discharge date: Pending clinical course  Education: Medications  Plan of care: See Below    MEDICATIONS  (STANDING):  albumin human  5% IVPB 500 milliLiter(s) IV Intermittent every 8 hours  aMIOdarone Infusion 0.25 mG/Min (8.33 mL/Hr) IV Continuous <Continuous>  buDESOnide    Inhalation Suspension 0.5 milliGRAM(s) Inhalation every 12 hours  cefepime   IVPB      cefepime   IVPB 1000 milliGRAM(s) IV Intermittent every 8 hours  chlorhexidine 0.12% Liquid 15 milliLiter(s) Oral Mucosa every 12 hours  chlorhexidine 2% Cloths 1 Application(s) Topical <User Schedule>  CRRT Treatment    <Continuous>  DOBUTamine Infusion 2 MICROgram(s)/kG/Min (5.62 mL/Hr) IV Continuous <Continuous>  fluconAZOLE IVPB 400 milliGRAM(s) IV Intermittent every 24 hours  hydrocortisone sodium succinate Injectable 50 milliGRAM(s) IV Push every 12 hours  insulin lispro (ADMELOG) corrective regimen sliding scale   SubCutaneous every 6 hours  metroNIDAZOLE  IVPB 500 milliGRAM(s) IV Intermittent every 12 hours  nystatin Powder 1 Application(s) Topical every 12 hours  pantoprazole  Injectable 40 milliGRAM(s) IV Push daily  Phoxillum Filtration BK 4 / 2.5 5000 milliLiter(s) (1500 mL/Hr) CRRT <Continuous>  PrismaSOL Filtration BGK 4 / 2.5 5000 milliLiter(s) (1250 mL/Hr) CRRT <Continuous>  PrismaSOL Filtration BGK 4 / 2.5 5000 milliLiter(s) (250 mL/Hr) CRRT <Continuous>  sodium chloride 3%  Inhalation 4 milliLiter(s) Inhalation every 6 hours    MEDICATIONS  (PRN):  oxyCODONE    Solution 5 milliGRAM(s) Enteral Tube every 4 hours PRN Moderate to Severe Pain (4 - 10)    PAST MEDICAL & SURGICAL HISTORY:  Diabetes    Transaminitis    Paroxysmal atrial fibrillation    Depression    BPH (benign prostatic hyperplasia)    Hypertension    Chronic atrial fibrillation    Coronary artery disease    Hepatocellular carcinoma    DM (diabetes mellitus)    HTN (hypertension)    Paroxysmal atrial fibrillation    Cirrhosis    HCC (hepatocellular carcinoma)    History of BPH    History of laparoscopic cholecystectomy    History of lumbar laminectomy    H/O prior ablation treatment    H/O percutaneous left heart catheterization    Vital Signs Last 24 Hrs  T(C): 36.5 (14 Dec 2024 11:00), Max: 37 (14 Dec 2024 03:00)  T(F): 97.7 (14 Dec 2024 11:00), Max: 98.6 (14 Dec 2024 03:00)  HR: 99 (14 Dec 2024 12:00) (87 - 109)  BP: --  BP(mean): --  RR: 26 (14 Dec 2024 12:00) (14 - 27)  SpO2: 99% (14 Dec 2024 12:00) (93% - 99%)    Parameters below as of 14 Dec 2024 11:15  Patient On (Oxygen Delivery Method): ventilator    I&O's Summary    13 Dec 2024 07:01  -  14 Dec 2024 07:00  --------------------------------------------------------  IN: 2619 mL / OUT: 5030 mL / NET: -2411 mL    14 Dec 2024 07:01  -  14 Dec 2024 13:55  --------------------------------------------------------  IN: 469.5 mL / OUT: 555 mL / NET: -85.5 mL                          10.6   14.97 )-----------( 25       ( 14 Dec 2024 12:22 )             32.1     12-14    138  |  105  |  39[H]  ----------------------------<  188[H]  4.6   |  18[L]  |  0.78    Ca    7.4[L]      14 Dec 2024 12:22  Phos  3.0     12-14  Mg     2.3     12-14    TPro  3.9[L]  /  Alb  3.2[L]  /  TBili  17.6[H]  /  DBili  x   /  AST  45[H]  /  ALT  61[H]  /  AlkPhos  140[H]  12-14    Culture - Fungal, Other (collected 12-12-24 @ 18:00)  Source: .Other  Preliminary Report (12-13-24 @ 11:29):    Testing in progress    Culture - Blood (collected 12-07-24 @ 15:30)  Source: .Blood BLOOD  Final Report (12-12-24 @ 19:00):    No growth at 5 days    ROS: Unable to assess patient is intubated, sedated    PHYSICAL EXAM:   Constitutional: intubated, sedated   Eyes:  PERRLA  ENMT: nc/at, no thrush   Neck: supple   Respiratory: CTA B/L  Cardiovascular: RRR  Gastrointestinal: incision clean/dry/intact + Ostomy pink   Genitourinary: Castro in place   Extremities: SCD's in place and working bilaterally, + LE edema  Neurological: intubated, sedated  Skin: no rashes, ulcerations, lesions

## 2024-12-14 NOTE — PROGRESS NOTE ADULT - ASSESSMENT
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 Pt underwent Y90 Sept, 2023 with favorable treatment response.s/p OLT 11/15/24     on 12/7/24 s/p Total abdominal colectomy with ileostomy  on 12/9 s/p ex-lap with ileostomy.    # Ileus/ischemic bowel  ileus for >1 week- Cdiff neg 12/1  S/p rectal decompression  # severe septic shock 12/6 and pancytopenia, lactic acidosis  # Ecoli bacteremia in context of above  received meropenem, linezolid/caspofungin + tobramycin x1 on 11/6  BC 12/6 positive  s/p colectomy on 12/6- noted dusky appearance- plan to go back to OR  # cardiogenic shock  on dobutamine- s/p impella  # LLL mucus plug  --Continue Cefepime 1g IV Q8H while on CVVHD  --Continue Metronidazole 500 mg IV Q12H through 12/14/24 to complete 7 days of empiric anaerobic coverage  --Fluconazole increased to 400 mg Q24H while on CVVHD  --Continue to follow CBC with diff  --Continue to follow temperature curve  --Follow up on preliminary blood cultures    #Testicular Erythema/Edema  Repeat Testicular US (12/12) 2.1 x0.9 x 3.2 cm focal, complex fluid collection within the soft tissues inferior to the right testicle, skin thickening c/w cellulitis  HSV/VZV PCR per dermatology negative  Dermatology fungal culture with no growth  --Urology with recommendation for repeat testicular US. follow results but would agree with repeating Ct A/P to reassess . If fluid is tracking from abdominal tract high risk of infected fluid collection secondary to intraabdominal infection and would then favor aspiration for cultures +/- drainage     #s/p OLT 11/15/24 CMV D?R+  EBV , toxo D?/R?  --Bactrim on hold given thrombocytopenia, unlikely to absorb atovaquone  --off ganciclovir now, If no thrombocytopenia recovery (with other adjustments) would need to switch to IV Letermovir  - CMV PCR on monday- please order    #Fever, sepsis, hypoxic respiratory failure s/p on mechanical ventilation  CT C/A/P Bilateral lower lobe consolidation with fluid and debris in the right lower lobe bronchi, concerning for aspiration pneumonia.  CMV PCR (11/24) 146 (low level CMV viremia - not likely contributing)  Adenovirus PCR (11/24) Negative  Cryptococcal Serum Ag (11/24) Negative  serum and bronch aspergillus galactomannan negative  WNV Serology and Serum PCR Negative  CT Chest (11/25) Groundglass opacities in the right lower and left upper lobes, possibly infectious in nature.  CT A/P (11/25) No acute process  s/p Meropenem 1 gram q 8hr (11/23 >11/29)          Thank you for involving us in the care of this patient  Transplant ID will continue to follow  Please call or page with additional questions  Pager; #[phone]  Teams: from 8 am to 5 pm  Rachele Lewis MD

## 2024-12-14 NOTE — PROGRESS NOTE ADULT - ASSESSMENT
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 Post-op course c/b worsening mental status and acute hypoxic respiratory failure requiring multiple intubations/extubations, currently extubated (as of 11/26/24) and colonic ileus s/p several rounds of Relistor and Neostigmine with NGT and rectal tube currently in place now with septic shock of unclear etiology. Transplant nephrology consulted for metabolic acidosis and MORAIMA.    1. s/p OLT 11/15/24 with oliguric MORAIMA in setting of septic shock.  - Likely hemodynamically mediated in setting of cardiogenic and septic shock on multiple vasopressors and IABP.   - SCr on admission was 0.48 11/15/24  - Pt. oliguric, UOP today is 0 cc.   - Urinalysis trace ketones, protein 30, 17 casts.   - CT AP non-con: Bilateral renal cysts including cysts with peripheral calcification in the left kidney.  - Pt. initiated on CRRT 12/7/24-.  - Continue with CRRT at this time, pt. appears to be tolerating well.     2. Metabolic Acidosis   - AGMA in setting of septic shock, starvation ketosis, lactic acidosis and MORAIMA.  - Lactate elevated at 8.2 now improved to 2  - BHB was 2.3, repeat BHB.

## 2024-12-14 NOTE — PROGRESS NOTE ADULT - CRITICAL CARE ATTENDING COMMENT
Dr. Rosenberg (Attending Physician)  N - Lethargic, off sedation, not following in extremities, opens eyes  P - SBT on 5/5 tolerated 12 hours yesterday, slow wean on Jacqui, abg looks good  C - dobutamine 2, amio 0.25 CI 3 weaning Jacqui  GI - postpyloric TFs increase to 40 mL/hr   - repeat scrotal ultrasound, decrease CVVHD to 50 mL/hr   H - SAVANAH, plts given for 13 yesterday  ID - cefepime, metronidazole dc today, fluc, ganciclovir  E - glucose on ssi, stress dose steroids tapering  PPx - ppi,      Lines -   Dispo - Dr. Rosenberg (Attending Physician)  N - Lethargic, off sedation, not following in extremities, opens eyes  P - SBT on 5/5 tolerated 12 hours yesterday will use as tolerated today, slow wean on Jacqui, abg looks good  C - dobutamine 2, amio 0.25 CI 3 weaning Jacqui  GI - postpyloric TFs increase to 40 mL/hr   - repeat scrotal ultrasound, stop pulling fluid with CVP 3, weaning Jacqui. adding on albumin 5% for today for drain output  H - Thrombocytopenia send SAVANAH, plts given for 13 yesterday, improved today. More likely sepsis + liver dysfunction causing symptoms.  ID - cefepime, metronidazole dc today, fluc, ganciclovir  E - glucose on ssi, stress dose steroids tapering  PPx - ppi,

## 2024-12-14 NOTE — PROGRESS NOTE ADULT - ATTENDING COMMENTS
scrotal edema. no pathology present that would require surgical intervention.  continue elevation of the scrotum.  poor overall prognosis  repeat sono pending.

## 2024-12-14 NOTE — PROGRESS NOTE ADULT - SUBJECTIVE AND OBJECTIVE BOX
Subjective  Patient seen and examined at the bedside. Interview limited by pt mental status.     Objective    Vital signs  T(F): , Max: 98.6 (12-14-24 @ 03:00)  HR: 100 (12-14-24 @ 09:00)  BP: 170/79 (12-13-24 @ 12:00)  SpO2: 97% (12-14-24 @ 09:00)  Wt(kg): --    Output     OUT:    Bulb (mL): 1440 mL    Bulb (mL): 830 mL    Indwelling Catheter - Urethral (mL): 55 mL    Nasogastric/Oral tube (mL): 100 mL    VAC (Vacuum Assisted Closure) System (mL): 550 mL  Total OUT: 2975 mL    Total NET: -2975 mL      OUT:    Bulb (mL): 50 mL    Bulb (mL): 100 mL    Indwelling Catheter - Urethral (mL): 5 mL    VAC (Vacuum Assisted Closure) System (mL): 0 mL  Total OUT: 155 mL    Total NET: -155 mL          Gen: NAD  Abd: soft, nontender, nondistended  : Scrotum globally edematous w/ erythema of skin, covered in powdered substance.  Labs      12-14 @ 06:20    WBC 11.53 / Hct 30.6  / SCr 0.87     12-14 @ 02:15    WBC 15.24 / Hct 32.6  / SCr 0.77         Culture - Fungal, Other (collected 12-12-24 @ 18:00)  Source: .Other  Preliminary Report (12-13-24 @ 11:29):    Testing in progress    Culture - Blood (collected 12-07-24 @ 15:30)  Source: .Blood BLOOD  Final Report (12-12-24 @ 19:00):    No growth at 5 days    Culture - Body Fluid with Gram Stain (collected 12-07-24 @ 11:18)  Source: Body Fluid  Gram Stain (12-07-24 @ 18:22):    polymorphonuclear leukocytes seen    No organisms seen    by cytocentrifuge  Final Report (12-12-24 @ 08:53):    No growth at 5 days        Urine Cx: ?  Blood Cx: ?    Imaging

## 2024-12-14 NOTE — PROGRESS NOTE ADULT - ASSESSMENT
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 Post-op course c/b worsening mental status and acute hypoxic respiratory failure requiring multiple intubations/extubations, currently intubated (as of 12/7/2024) and cardiogenic shock s/p Percutaneous insertion of an intra-aortic balloon pump and septic shock/colonic ischemia s/p total abdominal colectomy with ileostomy on 12/7/24,  s/p ex-lap w/ileostomy on 12/9/24. Urology consulted for scrotal edema w/ large skin breakdown. The symptom start one week ago and getting worse recent two days with large area skin breakdown and redness on the whole scrotal area, as well the bilateral groin and internal thigh. Castro in place with clear yellowish colored urine.   In CTU, pt WBC 7, H/H 10/32, Cr 1.29, Lactate 8.7. blood culture on 12/6 growth E. coli, and no growth on 12/7. Patient off pressor currently.    Scrotal edema & skin breakdown possibly 2/2 intra-abdominal fluid tracking vs third-spacing from cardiogenic shock    Recommendations:   - No acute urologic intervention indicated   - Scrotal elevation  - Serial examination of scrotum  - Repeat scrotal ultrasound  - Wound care consult, appreciate derm recs  - No surgical intervention indicated, local wound care  - Rest of care per primary team    Urology

## 2024-12-14 NOTE — PROGRESS NOTE ADULT - ASSESSMENT
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 Patient's post-op course has been c/b multiple reintubation, total colectomy w/ ileostomy, and IABP placement 2/2 cardiogenic shock with subsequent removal.     Neuro:  - Follows commands, off sedation  - pain control w/ oxycodone  - CT head 11/19, 11/21, 11/25, 11/29, 12/5 negative  - EEG: Mild diffuse cerebral dysfunction that is not specific in etiology. No epileptic discharges recorded. No seizures recorded.  - Holding home xanax, Abilify, Zoloft, Remeron, Lexapro     Resp:  -PRVC 14/500/5/30  -SBT daily  -c/w inhaled bronchodilator  -VBG daily  - nitric 4 > 3    CV:  -IABP removed 12/10  -dobutamine gtt  -amiodarone gtt  -trend lactate  -hold home metoprolol  -TTE 12/11 shows EF of 55-60%    GI:  -trend LFTs  -NPO w/ tube feeds  -protonix QD  -high output from ALYSSA drains, continue to monitor quantity & quality    /Renal:  -c/w CRRT goal net -100  -monitor BUN/Cr  -I&Os, trend UOP  -elevate scrotum i/s/o edema   -repeat scrotal US tomorrow as per urology    Heme:  -trend H/H  -monitor coags    ID:  -ABx w/ cefepime, metronidazole, fluconazole   -monitor WBC, fever curve  -CMV PPx w/ ganciclovir  -holding cellcept, cyclosporine    Endo:  -monitor glucose   -SDS   -ISS     Lines:   -NGT x 2  -nichols  -R IJ (12/7)  -L IJ (12/7)  -ALYSSA x 2

## 2024-12-14 NOTE — PROGRESS NOTE ADULT - SUBJECTIVE AND OBJECTIVE BOX
24 HOUR EVENTS:  - weaning nitric  - off insulin ggt    NEURO  RASS (if intubated): 		[facility] ICU (if concern for delirium):  Exam: sedated, follows x4  Meds: oxyCODONE    Solution 5 milliGRAM(s) Enteral Tube every 4 hours PRN Moderate to Severe Pain (4 - 10)      RESPIRATORY  RR: 21 (12-14-24 @ 00:00) (14 - 26)  SpO2: 94% (12-14-24 @ 00:00) (93% - 99%)  Wt(kg): --  Exam: Lungs CTA b/l  Mechanical Ventilation: Mode: CPAP with PS, RR (patient): 15, FiO2: 30, PEEP: 5, PS: 5, ITime: 1, MAP: 11, PIP: 12  ABG - ( 13 Dec 2024 19:43 )  pH: 7.43  /  pCO2: 36    /  pO2: 137   / HCO3: 24    / Base Excess: -0.2  /  SaO2: 100.0   Lactate: x                Meds: albuterol/ipratropium for Nebulization 3 milliLiter(s) Nebulizer every 6 hours PRN Shortness of Breath and/or Wheezing  buDESOnide    Inhalation Suspension 0.5 milliGRAM(s) Inhalation every 12 hours      CARDIOVASCULAR  HR: 107 (12-14-24 @ 00:00) (76 - 107)  BP: 170/79 (12-13-24 @ 12:00) (170/79 - 170/79)  BP(mean): 113 (12-13-24 @ 12:00) (113 - 113)  ABP: 138/62 (12-14-24 @ 00:00) (125/51 - 165/70)  ABP(mean): 91 (12-14-24 @ 00:00) (75 - 107)  Wt(kg): --  CVP(cm H2O): --      Exam: Normal S1/S2 w/o murmurs or rubs  Cardiac Rhythm: sinus  Perfusion     [ x]Adequate   [ ]Inadequate  Mentation   [ ]Normal       [x ]Reduced  Extremities  [x ]Warm         [ ]Cool  Volume Status [ ]Hypervolemic [x ]Euvolemic [ ]Hypovolemic  Meds: aMIOdarone Infusion 0.25 mG/Min IV Continuous <Continuous>  DOBUTamine Infusion 2 MICROgram(s)/kG/Min IV Continuous <Continuous>      GI/NUTRITION  Exam: abd non distended, ostomy viable  Diet: tube feeds  Last Bowel Movement: 13-Dec-2024 (12-13-24 @ 12:00)  Last Bowel Movement: 12-Dec-2024 (12-12-24 @ 20:00)  Last Bowel Movement: 10-Dec-2024 (12-10-24 @ 20:00)      Meds: pantoprazole  Injectable 40 milliGRAM(s) IV Push daily      GENITOURINARY  I&O's Detail    12-12 @ 07:01  -  12-13 @ 07:00  --------------------------------------------------------  IN:    Albumin 25%  -  50 mL: 500 mL    Amiodarone: 400.8 mL    Cryoprecipitate: 225 mL    DOBUTamine: 8.4 mL    DOBUTamine: 128.8 mL    Enteral Tube Flush: 20 mL    freetext medication - Infusion: 698.4 mL    Insulin: 56 mL    IV PiggyBack: 50 mL    IV PiggyBack: 650 mL    Nepro with Carb Steady: 240 mL    Platelets - Single Donor: 225 mL    Vasopressin: 1.5 mL  Total IN: 3203.9 mL    OUT:    Bulb (mL): 1000 mL    Bulb (mL): 1730 mL    Dexmedetomidine: 0 mL    Indwelling Catheter - Urethral (mL): 40 mL    Norepinephrine: 0 mL    Other (mL): 3308 mL  Total OUT: 6078 mL    Total NET: -2874.1 mL      12-13 @ 07:01  -  12-14 @ 00:43  --------------------------------------------------------  IN:    Amiodarone: 100.1 mL    Amiodarone: 24.9 mL    Amiodarone: 49.8 mL    Cryoprecipitate: 150 mL    DOBUTamine: 89.6 mL    Enteral Tube Flush: 60 mL    freetext medication - Infusion: 87.3 mL    freetext medication - Infusion: 29.1 mL    Insulin: 12 mL    IV PiggyBack: 500 mL    Nepro with Carb Steady: 420 mL    Platelets - Single Donor: 225 mL  Total IN: 1747.8 mL    OUT:    Bulb (mL): 480 mL    Bulb (mL): 1120 mL    Indwelling Catheter - Urethral (mL): 45 mL    Other (mL): 1649 mL    VAC (Vacuum Assisted Closure) System (mL): 500 mL  Total OUT: 3794 mL    Total NET: -2046.2 mL          12-13    139  |  105  |  37[H]  ----------------------------<  151[H]  4.2   |  21[L]  |  0.79    Ca    8.4      13 Dec 2024 20:23  Phos  3.6     12-13  Mg     2.3     12-13    TPro  3.8[L]  /  Alb  3.3  /  TBili  15.6[H]  /  DBili  x   /  AST  54[H]  /  ALT  56[H]  /  AlkPhos  107  12-13    Meds:     HEMATOLOGIC  Meds:                         10.0   11.59 )-----------( 21       ( 13 Dec 2024 20:23 )             30.0     PT/INR - ( 13 Dec 2024 14:22 )   PT: 21.5 sec;   INR: 1.88 ratio         PTT - ( 13 Dec 2024 20:23 )  PTT:47.5 sec    INFECTIOUS DISEASES  T(C): 36.8 (12-13-24 @ 23:00), Max: 36.8 (12-13-24 @ 04:00)  Wt(kg): --  WBC Count: 11.59 K/uL (12-13 @ 20:23)  WBC Count: 7.83 K/uL (12-13 @ 14:22)    Recent Cultures:  Specimen Source: .Other, 12-12 @ 18:00; Results   Testing in progress; Gram Stain: --; Organism: --  Specimen Source: .Blood BLOOD, 12-07 @ 15:30; Results   No growth at 5 days; Gram Stain: --; Organism: --  Specimen Source: Body Fluid, 12-07 @ 11:18; Results   No growth at 5 days; Gram Stain:   polymorphonuclear leukocytes seen  No organisms seen  by cytocentrifuge; Organism: --    Meds: cefepime   IVPB      cefepime   IVPB 1000 milliGRAM(s) IV Intermittent every 8 hours  fluconAZOLE IVPB 400 milliGRAM(s) IV Intermittent every 24 hours  ganciclovir IVPB 120 milliGRAM(s) IV Intermittent every 24 hours  metroNIDAZOLE  IVPB 500 milliGRAM(s) IV Intermittent every 12 hours      ENDOCRINE  Capillary Blood Glucose    Meds: hydrocortisone sodium succinate Injectable 50 milliGRAM(s) IV Push every 12 hours  insulin lispro (ADMELOG) corrective regimen sliding scale   SubCutaneous every 6 hours      ACCESS DEVICES:  [ ] Peripheral IV  [ ] Central Venous Line		[ ] R	[ ] L	[ ] IJ	[ ] Fem	[ ] SC	Placed:   [ ] Arterial Line			[ ] R	[ ] L	[ ] Fem	[ ] Rad	[ ] Ax	Placed:   [ ] PICC:					[ ] Mediport  [ ] Urinary Catheter, Date Placed:   [ ] Necessity of urinary, arterial, and venous catheters discussed    OTHER MEDICATIONS:  chlorhexidine 0.12% Liquid 15 milliLiter(s) Oral Mucosa every 12 hours  chlorhexidine 2% Cloths 1 Application(s) Topical <User Schedule>  CRRT Treatment    <Continuous>  nystatin Powder 1 Application(s) Topical every 12 hours  Phoxillum Filtration BK 4 / 2.5 5000 milliLiter(s) CRRT <Continuous>  PrismaSOL Filtration BGK 4 / 2.5 5000 milliLiter(s) CRRT <Continuous>  PrismaSOL Filtration BGK 4 / 2.5 5000 milliLiter(s) CRRT <Continuous>      IMAGING:

## 2024-12-14 NOTE — PROGRESS NOTE ADULT - SUBJECTIVE AND OBJECTIVE BOX
[facility] DIVISION OF KIDNEY DISEASES AND HYPERTENSION -- FOLLOW UP NOTE  --------------------------------------------------------------------------------  Chief Complaint:  s/p OLT 11/15/24 with oliguric MORAIMA in setting of cardiogenic/septic shock.    24 hour events/subjective: Pt. seen and examined in CTU earlier today. Pt. remains intubated and sedated. Pt. continues to receive CRRT. Acidosis improved      PAST HISTORY  --------------------------------------------------------------------------------  No significant changes to PMH, PSH, FHx, SHx, unless otherwise noted    ALLERGIES & MEDICATIONS  --------------------------------------------------------------------------------  Allergies    No Known Allergies    Intolerances        REVIEW OF SYSTEMS  --------------------------------------------------------------------------------    Unable to obtain    MEDICATIONS  (STANDING):  albumin human  5% IVPB 500 milliLiter(s) IV Intermittent every 8 hours  aMIOdarone Infusion 0.25 mG/Min (8.33 mL/Hr) IV Continuous <Continuous>  buDESOnide    Inhalation Suspension 0.5 milliGRAM(s) Inhalation every 12 hours  cefepime   IVPB      cefepime   IVPB 1000 milliGRAM(s) IV Intermittent every 8 hours  chlorhexidine 0.12% Liquid 15 milliLiter(s) Oral Mucosa every 12 hours  chlorhexidine 2% Cloths 1 Application(s) Topical <User Schedule>  DOBUTamine Infusion 2 MICROgram(s)/kG/Min (5.62 mL/Hr) IV Continuous <Continuous>  fluconAZOLE IVPB 400 milliGRAM(s) IV Intermittent every 24 hours  hydrocortisone sodium succinate Injectable   IV Push   hydrocortisone sodium succinate Injectable 50 milliGRAM(s) IV Push every 12 hours  insulin lispro (ADMELOG) corrective regimen sliding scale   SubCutaneous every 6 hours  metroNIDAZOLE  IVPB 500 milliGRAM(s) IV Intermittent every 12 hours  nystatin Powder 1 Application(s) Topical every 12 hours  pantoprazole  Injectable 40 milliGRAM(s) IV Push daily  sodium chloride 3%  Inhalation 4 milliLiter(s) Inhalation every 6 hours    MEDICATIONS  (PRN):  oxyCODONE    Solution 5 milliGRAM(s) Enteral Tube every 4 hours PRN Moderate to Severe Pain (4 - 10)      Vital Signs Last 24 Hrs  T(C): 36.5 (14 Dec 2024 11:00), Max: 37 (14 Dec 2024 03:00)  T(F): 97.7 (14 Dec 2024 11:00), Max: 98.6 (14 Dec 2024 03:00)  HR: 102 (14 Dec 2024 14:00) (85 - 109)  BP: --  BP(mean): --  RR: 17 (14 Dec 2024 14:00) (14 - 27)  SpO2: 97% (14 Dec 2024 14:00) (93% - 99%)    Parameters below as of 14 Dec 2024 11:15  Patient On (Oxygen Delivery Method): ventilator        I&O's Summary    13 Dec 2024 07:01  -  14 Dec 2024 07:00  --------------------------------------------------------  IN: 2619 mL / OUT: 5030 mL / NET: -2411 mL    14 Dec 2024 07:01  -  14 Dec 2024 14:59  --------------------------------------------------------  IN: 1013.4 mL / OUT: 645 mL / NET: 368.4 mL      Physical Exam:  Gen: critically ill, intubated and sedated.   Pulm: intubated.   CV: tachycardic, S1S2+  Abd: + distended. +incisions. +NGT in place. +ostomy   : +nichols catheter   MSK: 2+ pitting above knee including scrotum  Skin: Warm  Access: R IJ non-tunneled HD catheter being used for CRRT    LABS/STUDIES  --------------------------------------------------------------------------------                                10.6   14.97 )-----------( 25       ( 14 Dec 2024 12:22 )             32.1     12-14    138  |  105  |  39[H]  ----------------------------<  188[H]  4.6   |  18[L]  |  0.78    Ca    7.4[L]      14 Dec 2024 12:22  Phos  3.0     12-14  Mg     2.3     12-14    TPro  3.9[L]  /  Alb  3.2[L]  /  TBili  17.6[H]  /  DBili  x   /  AST  45[H]  /  ALT  61[H]  /  AlkPhos  140[H]  12-14          Culture - Bronchial (collected 12-14-24 @ 03:17)  Source: Combi-Cath  Gram Stain (12-14-24 @ 14:01):    Numerous polymorphonuclear leukocytes per low power field    No Squamous epithelial cells per low power field    No organisms seen per oil power field    Culture - Fungal, Other (collected 12-12-24 @ 18:00)  Source: .Other  Preliminary Report (12-13-24 @ 11:29):    Testing in progress    Culture - Blood (collected 12-07-24 @ 15:30)  Source: .Blood BLOOD  Final Report (12-12-24 @ 19:00):    No growth at 5 days

## 2024-12-15 NOTE — PROGRESS NOTE ADULT - ASSESSMENT
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     s/p OLT 11/15 with complicated post op course    [] Septic shock/Cardiogenic shock  [] s/p Colectomy 12/7 POD#8  [] RTOR for closure and Ileostomy creation   [] IAPB 12/7- removed 12/10: CTICU on board  - E coli Bacteremia (12/6) - on jeniffer/caspo -> switched to  cefepime/fluc. Completed 7days of emperic flagyl.  Received Tobra x 1 12/6 . Continue with cefepime due to leukocytosis.  - Fluc 400mg   - Neutropenia: received Neupogen 480mcg x2  - MORAIMA: CRRT started 12/7  - trend lactate  - echo 12/11  - AMS; CT Head pending    [] s/p OLT POD #30  - trend LFT's  - Diet: increase TFs as needed  - Pain management: avoid narcotics  - Strict I&Os, nichols, wound vac placed 12/10   - US: L brachial DVT   - wound vac exchange for ileostomy (12/13)  - HIT panel neg (12/14)    [ ] Immunosuppression  - Tacrolimus stopped 11/18 in setting of AMS/Seizure, Started Cyclo 11/24  - Cyclo held, MMF held, on stress dose steroids  - PPx: Gancyclovir (HELD), bactrim (HELD) in setting of thrombocytopenia    [] lleus   -@ home on Linzess  - imaging with distended colon (likely opioid induced)  - s/p Neostigmine x 2  - s/p Relistor, last dose 12/1  - s/p colectomy with end ileostomy  (see above)  - Daily AXR     [] CMV viremia  - d/c gancylovir due to thrombocytopenia  - CMV PCR (12/11): neg    [ ] AMS  - Reintubated 11/20 Aspiration/hypoxia, extubated 11/24->[id number]/24--> extubated 11/26 -> [id number]/7  - EEG 11/30 negative, Neurology following  - BH following   - off tacro  - on keppra    [ ] HTN/ pAFib  - On Amiodarone  - metoprolol held (hypotensive)   - Eliquis 5mg bid - held 12/6.   - Dr. 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     s/p OLT 11/15 with complicated post op course    [] Septic shock/Cardiogenic shock  [] s/p Colectomy 12/7 POD#8  [] RTOR for closure and Ileostomy creation   [] IAPB 12/7- removed 12/10: CTICU on board  - E coli Bacteremia (12/6) - on jeniffer/caspo -> switched to  cefepime/fluc. Completed 7days of emperic flagyl.  Received Tobra x 1 12/6 .   - Continue with cefepime due to leukocytosis.  - Fluc 400mg   - Neutropenia: received Neupogen 480mcg x2  - MORAIMA: CRRT started 12/7  - trend lactate  - echo 12/11  - AMS; f/u CT Head   - scrotal edema: repeat scrotal us today     [] s/p OLT POD #30  - trend LFT's  - Diet: increase TFs as needed  - Pain management: avoid narcotics  - Strict I&Os, nichols, wound vac placed 12/10   - US: L brachial DVT   - wound vac exchange for ileostomy (12/13)  - HIT panel neg (12/14)  - transf plt today    [ ] Immunosuppression  - Tacrolimus stopped 11/18 in setting of AMS/Seizure, Started Cyclo 11/24  - Cyclo held, MMF held, on stress dose steroids  - PPx: Gancyclovir (HELD), bactrim (HELD) in setting of thrombocytopenia  - check CMV PCR tomorrow (ordered)     [] lleus   -@ home on Linzess  - imaging with distended colon (likely opioid induced)  - s/p Neostigmine x 2  - s/p Relistor, last dose 12/1  - s/p colectomy with end ileostomy  (see above)  - Daily AXR   - held oxy    [] CMV viremia  - d/c gancylovir due to thrombocytopenia  - CMV PCR (12/11): neg    [ ] AMS  - Reintubated 11/20 Aspiration/hypoxia, extubated 11/24->[id number]/24--> extubated 11/26 -> [id number]/7  - EEG 11/30 negative, Neurology following  - BH following   - off tacro  - on keppra    [ ] HTN/ pAFib  - On Amiodarone  - metoprolol held (hypotensive)   - Eliquis 5mg bid - held 12/6.   - Dr. 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     s/p OLT 11/15 with complicated post op course    [] Septic shock/Cardiogenic shock  [] s/p Colectomy 12/7 POD#8  [] RTOR for closure and Ileostomy creation   [] IAPB 12/7- removed 12/10: CTICU on board  - E coli Bacteremia (12/6) - on jeniffer/caspo -> switched to  cefepime/fluc. Completed 7days of emperic flagyl.  Received Tobra x 1 12/6 .   - Continue with cefepime due to leukocytosis.  - Fluc 400mg   - Neutropenia: received Neupogen 480mcg x2  - MORAIMA: CRRT started 12/7  - trend lactate  - echo 12/11  - AMS; f/u CT Head   - scrotal edema: repeat scrotal us today     [] s/p OLT POD #30  - trend LFT's  - Diet: increase TFs as needed  - Pain management: avoid narcotics  - Strict I&Os, nichols, wound vac placed 12/10   - US: L brachial DVT   - wound vac exchange for ileostomy (12/13)  - HIT panel neg (12/14)  - transf plt today    [ ] Immunosuppression  - Tacrolimus stopped 11/18 in setting of AMS/Seizure, Started Cyclo 11/24  - Cyclo held, MMF held, Solumedrol 16mg daily  - PPx: Gancyclovir (HELD), bactrim (HELD) in setting of thrombocytopenia  - check CMV PCR tomorrow (ordered)     [] lleus   -@ home on Linzess  - imaging with distended colon (likely opioid induced)  - s/p Neostigmine x 2  - s/p Relistor, last dose 12/1  - s/p colectomy with end ileostomy  (see above)  - Daily AXR   - held oxy    [] CMV viremia  - d/c gancylovir due to thrombocytopenia  - CMV PCR (12/11): neg    [ ] AMS  - Reintubated 11/20 Aspiration/hypoxia, extubated 11/24->rvrcqhmgebj74/24--> extubated 11/26 -> [id number]/7  - EEG 11/30 negative, Neurology following  - BH following   - off tacro  - on keppra    [ ] HTN/ pAFib  - On Amiodarone  - metoprolol held (hypotensive)   - Eliquis 5mg bid - held 12/6.   - Dr. Quintanilla following    [ ] DM  - ISS     []Scrotal abscess   - US (12/12): 2.1 x0.9 x 3.2 cm focal, right testicle- possible abscess (12/12)  -Urology c/s recs nothing to do, repeat ultrasound   - CT CAP ordered

## 2024-12-15 NOTE — PROGRESS NOTE ADULT - SUBJECTIVE AND OBJECTIVE BOX
INTERVAL EVENTS:  - nitric off  - ganciclovir dc'ed for thrombocytopenia, 1u platelets given, HIT panel sent  - albumin 5% 500cc q8 for 1 day  - Call dental for crack in lower incisors  - more somnolent, ammonia normal, pending CTH, C/A/P  - lasix 80 IV for diruetic challenge after CT imaging  - repeat US scrotum  -  weaned to 1    SUBJECTIVE/ROS:  [ ] A ten-point review of systems was otherwise negative except as noted.  [ ] Due to altered mental status/intubation, subjective information were not able to be obtained from the patient. History was obtained, to the extent possible, from review of the chart and collateral sources of information.      NEURO  Exam:  moves extremities, grimaces to pain, opens eyes non-purposefully   Meds: oxyCODONE    Solution 5 milliGRAM(s) Enteral Tube every 4 hours PRN Moderate to Severe Pain (4 - 10)  [x] Adequacy of sedation and pain control has been assessed and adjusted      RESPIRATORY  RR: 17 (24 @ 23:00) (14 - 27)  SpO2: 96% (24 @ 23:37) (93% - 100%)  Exam: unlabored, clear to auscultation bilaterally  Mechanical Ventilation: Mode: AC/ CMV (Assist Control/ Continuous Mandatory Ventilation), RR (machine): 14, RR (patient): 18, TV (machine): 500, FiO2: 30, PEEP: 5, ITime: 0.9, MAP: 7.5, PIP: 14  ABG - ( 14 Dec 2024 05:54 )  pH: 7.43  /  pCO2: 33    /  pO2: 149   / HCO3: 22    / Base Excess: -1.9  /  SaO2: 100.0       [N/A] Extubation Readiness Assessed  Meds: albuterol/ipratropium for Nebulization 3 milliLiter(s) Nebulizer every 6 hours  buDESOnide    Inhalation Suspension 0.5 milliGRAM(s) Inhalation every 12 hours  sodium chloride 3%  Inhalation 4 milliLiter(s) Inhalation every 6 hours      CARDIOVASCULAR  HR: 101 (24 @ 23:37) (85 - 109)  BP: 140/85 (24 @ 20:00) (140/85 - 140/85)  BP(mean): 106 (24 @ 20:00) (106 - 106)  ABP: 139/55 (24 @ 23:00) (95/47 - 162/73)  ABP(mean): 88 (24 @ 23:00) (64 - 107)    Exam: regular rate and rhythm  Cardiac Rhythm: sinus  Perfusion     [x]Adequate   [ ]Inadequate  Mentation   [x]Normal       [ ]Reduced  Extremities  [x]Warm         [ ]Cool  Volume Status [ ]Hypervolemic [x]Euvolemic [ ]Hypovolemic  Meds: aMIOdarone Infusion 0.25 mG/Min IV Continuous <Continuous>  DOBUTamine Infusion 1 MICROgram(s)/kG/Min IV Continuous <Continuous>      GI/NUTRITION  Exam: soft, nontender, nondistended  Diet: tube feeds  Meds: pantoprazole  Injectable 40 milliGRAM(s) IV Push daily      GENITOURINARY  I&O's Detail     @ 07:  -   @ 07:00  --------------------------------------------------------  IN:    Albumin 5%  - 250 mL: 250 mL    Amiodarone: 100.1 mL    Amiodarone: 24.9 mL    Amiodarone: 116.2 mL    Cryoprecipitate: 150 mL    DOBUTamine: 134.4 mL    Enteral Tube Flush: 180 mL    freetext medication - Infusion: 87.3 mL    freetext medication - Infusion: 29.1 mL    Insulin: 12 mL    IV PiggyBack: 650 mL    Nepro with Carb Steady: 660 mL    Platelets - Single Donor: 225 mL  Total IN: 2619 mL    OUT:    Bulb (mL): 1440 mL    Bulb (mL): 830 mL    Ileostomy (mL): 100 mL    Indwelling Catheter - Urethral (mL): 55 mL    Nasogastric/Oral tube (mL): 100 mL    Other (mL): 1955 mL    VAC (Vacuum Assisted Closure) System (mL): 550 mL  Total OUT: 5030 mL    Total NET: -2411 mL       @ 07:01  -  15 @ 00:22  --------------------------------------------------------  IN:    Albumin 5%  - 250 mL: 1000 mL    Amiodarone: 74.7 mL    Amiodarone: 66.4 mL    DOBUTamine: 84 mL    DOBUTamine: 5.6 mL    Enteral Tube Flush: 30 mL    IV PiggyBack: 400 mL    Nepro with Carb Steady: 590 mL    Platelets - Single Donor: 225 mL  Total IN: 2475.7 mL    OUT:    Bulb (mL): 300 mL    Bulb (mL): 750 mL    Ileostomy (mL): 125 mL    Indwelling Catheter - Urethral (mL): 90 mL    Other (mL): 1281 mL    VAC (Vacuum Assisted Closure) System (mL): 0 mL  Total OUT: 2546 mL    Total NET: -70.3 mL          138  |  106  |  36[H]  ----------------------------<  198[H]  4.4   |  20[L]  |  0.72    Ca    7.6[L]      14 Dec 2024 23:33  Phos  3.3       Mg     2.4         TPro  4.0[L]  /  Alb  3.4  /  TBili  16.4[H]  /  DBili  x   /  AST  37  /  ALT  50[H]  /  AlkPhos  131[H]  -14    [ ] Castro catheter, indication: N/A  Meds: albumin human  5% IVPB 500 milliLiter(s) IV Intermittent every 8 hours      HEMATOLOGIC  Meds:   [x] VTE Prophylaxis                        9.0    11.50 )-----------(        ( 14 Dec 2024 23:33 )             27.3     PT/INR - ( 14 Dec 2024 06:20 )   PT: 24.2 sec;   INR: 2.12 ratio         PTT - ( 14 Dec 2024 06:20 )  PTT:53.7 sec      INFECTIOUS DISEASES  WBC Count: 11.50 K/uL ( @ 23:33)  WBC Count: 11.68 K/uL ( @ 17:20)  WBC Count: 14.97 K/uL ( @ 12:22)  WBC Count: 11.53 K/uL ( @ 06:20)  WBC Count: 15.24 K/uL ( @ 02:15)    RECENT CULTURES:  Specimen Source: Combi-Cath  Date/Time:  @ 03:17  Culture Results: --  Gram Stain:   Numerous polymorphonuclear leukocytes per low power field  No Squamous epithelial cells per low power field  No organisms seen per oil power field  Organism: --  Specimen Source: .Other  Date/Time:  @ 18:00  Culture Results:   Culture is being performed. Fungal cultures are held for 4 weeks.  Gram Stain: --  Organism: --    Meds: cefepime   IVPB      cefepime   IVPB 1000 milliGRAM(s) IV Intermittent every 8 hours  fluconAZOLE IVPB 400 milliGRAM(s) IV Intermittent every 24 hours      ENDOCRINE  CAPILLARY BLOOD GLUCOSE      POCT Blood Glucose.: 196 mg/dL (14 Dec 2024 23:47)  POCT Blood Glucose.: 173 mg/dL (14 Dec 2024 12:56)    Meds: hydrocortisone sodium succinate Injectable   IV Push   hydrocortisone sodium succinate Injectable 25 milliGRAM(s) IV Push every 12 hours  insulin lispro (ADMELOG) corrective regimen sliding scale   SubCutaneous every 6 hours        CODE STATUS: full code

## 2024-12-15 NOTE — PROGRESS NOTE ADULT - ASSESSMENT
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 Patient's post-op course has been c/b multiple reintubation, total colectomy w/ ileostomy, and IABP placement 2/2 cardiogenic shock with subsequent removal.     PLAN  Neuro:  - Follows commands, off sedation  - pain control w/ oxycodone  - CT head 11/19, 11/21, 11/25, 11/29, 12/5 negative  - EEG: Mild diffuse cerebral dysfunction that is not specific in etiology. No epileptic discharges recorded. No seizures recorded.  - Holding home xanax, Abilify, Zoloft, Remeron, Lexapro     Resp:  -PRVC 14/500/5/30  -SBT daily  -c/w inhaled bronchodilator  -VBG daily    CV:  -IABP removed 12/10  -dobutamine gtt  -amiodarone gtt  -trend lactate  -hold home metoprolol  -TTE 12/11 shows EF of 55-60%    GI:  -trend LFTs  -NPO w/ tube feeds  -protonix QD  -high output from ALYSSA drains, continue to monitor quantity & quality    /Renal:  -c/w CRRT goal net -100; f/u lasix challenge 12/15  -monitor BUN/Cr  -I&Os, trend UOP  -elevate scrotum i/s/o edema   -repeat scrotal US tomorrow as per urology    Heme:  -trend H/H  -monitor coags    ID:  -ABx w/ cefepime, fluconazole   -monitor WBC, fever curve  -CMV PPx w/ ganciclovir (holding now iso thrombocytopenia)  -holding cellcept, cyclosporine    Endo:  -monitor glucose   -SDS   -ISS     Lines:   -NGT x 2  -nichols  -R IJ (12/7)  -L IJ (12/7)  -ALYSSA x 2

## 2024-12-15 NOTE — PROGRESS NOTE ADULT - CRITICAL CARE ATTENDING COMMENT
Dr. Rosenberg (Attending Physician)  N - Improved mentation following on RUE,   P - SBT this am 5/5 4 hours yesterday, weaned off Jacqui  C - Dobutamine 1, amio gtt at 0.25  GI - restart TFs inc to goal, NGT,    - lasix 80 mg IV x1, give albumin 25% today  H - hemoglobin stable, thrombocytopenia   ID - cefepime, fluconazole  E - glucose 241, start 3 units q8  PPx - ppi, no dvt  Lines - miguel nichols  Dispo -full code, possible trach

## 2024-12-15 NOTE — PROGRESS NOTE ADULT - SUBJECTIVE AND OBJECTIVE BOX
Transplant Surgery - Multidisciplinary Rounds  --------------------------------------------------------------  OLT   11/15/2024        POD#30  Colectomy 12/7/2024   POD#8  IABP 12/7 removed 12/10    Present:   Patient seen and examined with multidisciplinary Transplant team including Surgeon: Dr. Palomino, Dr. Sorto, JAZZ Colby, Hepatologist: Dr. Malagon and ICU team in AM rounds.   Disciplines not in attendance will be notified of the plan.     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     s/p OLT 11/15 with post op course c/b:  ·	Hypoxia: Reintubated 11/20, extubated 11/24->reintubated 11/24, extubated 11/26, reintubated 12/7  ·	Ileus   ·	Fever  ·	A fib  ·	AMS/Seizure   ·	L brachial DVT  ·	Neutropenia  ·	E coli Bacteremia (12/6)  ·	s/p Coloctomy 12/7.   ·	IABP 12/7, removed 12/10    Interval/Overnight Events:   - afebrile, off pressors, off NO  - on amio/dobut  - on CRRT  - ganciclovir held due to low plts  - scrotal edema, concerning for abscess, CT pending    Immunosuppression:  -Cyclo (held), MMF held, on stress dose steroids  -ongoing monitoring for signs of rejection    Potential Discharge date: Pending clinical course  Education: Medications  Plan of care: See Below    TX DATA HERE    ROS: Unable to assess patient is intubated, sedated    PHYSICAL EXAM:   Constitutional: intubated, sedated   Eyes:  PERRLA  ENMT: nc/at, no thrush   Neck: supple   Respiratory: CTA B/L  Cardiovascular: RRR  Gastrointestinal: incision clean/dry/intact + Ostomy pink   Genitourinary: Castro in place   Extremities: SCD's in place and working bilaterally, + LE edema  Neurological: intubated, sedated  Skin: no rashes, ulcerations, lesions Transplant Surgery - Multidisciplinary Rounds  --------------------------------------------------------------  OLT   11/15/2024        POD#30  Colectomy 12/7/2024   POD#8  IABP 12/7 removed 12/10    Present:   Patient seen and examined with multidisciplinary Transplant team including Surgeon: Dr. Palomino, Dr. Sorto, JAZZ Colby, Hepatologist: Dr. Malagon and ICU team in AM rounds.   Disciplines not in attendance will be notified of the plan.     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     s/p OLT 11/15 with post op course c/b:  ·	Hypoxia: Reintubated 11/20, extubated 11/24->reintubated 11/24, extubated 11/26, reintubated 12/7  ·	Ileus   ·	Fever  ·	A fib  ·	AMS/Seizure   ·	L brachial DVT  ·	Neutropenia  ·	E coli Bacteremia (12/6)  ·	s/p Coloctomy 12/7.   ·	IABP 12/7, removed 12/10    Interval/Overnight Events:   - afebrile, off pressors, off NO  - on amio/dobut  - on CRRT  - ganciclovir held due to low plts  - scrotal edema, CT pending    Immunosuppression:  -Cyclo (held), MMF held, on stress dose steroids  -ongoing monitoring for signs of rejection    Potential Discharge date: Pending clinical course  Education: intubated  Plan of care: See Below      MEDICATIONS  (STANDING):  albuterol/ipratropium for Nebulization 3 milliLiter(s) Nebulizer every 6 hours  aMIOdarone Infusion 0.25 mG/Min (8.33 mL/Hr) IV Continuous <Continuous>  buDESOnide    Inhalation Suspension 0.5 milliGRAM(s) Inhalation every 12 hours  cefepime   IVPB      cefepime   IVPB 1000 milliGRAM(s) IV Intermittent every 8 hours  chlorhexidine 0.12% Liquid 15 milliLiter(s) Oral Mucosa every 12 hours  chlorhexidine 2% Cloths 1 Application(s) Topical <User Schedule>  CRRT Treatment    <Continuous>  DOBUTamine Infusion 1 MICROgram(s)/kG/Min (2.81 mL/Hr) IV Continuous <Continuous>  fluconAZOLE IVPB 400 milliGRAM(s) IV Intermittent every 24 hours  insulin lispro (ADMELOG) corrective regimen sliding scale   SubCutaneous every 6 hours  methylPREDNISolone sodium succinate Injectable 16 milliGRAM(s) IV Push <User Schedule>  nystatin Powder 1 Application(s) Topical every 12 hours  pantoprazole  Injectable 40 milliGRAM(s) IV Push daily  Phoxillum Filtration BK 4 / 2.5 5000 milliLiter(s) (1500 mL/Hr) CRRT <Continuous>  PrismaSOL Filtration BGK 4 / 2.5 5000 milliLiter(s) (1250 mL/Hr) CRRT <Continuous>  PrismaSOL Filtration BGK 4 / 2.5 5000 milliLiter(s) (250 mL/Hr) CRRT <Continuous>  sodium chloride 3%  Inhalation 4 milliLiter(s) Inhalation every 6 hours    PAST MEDICAL & SURGICAL HISTORY:  Diabetes  Transaminitis  Paroxysmal atrial fibrillation  Depression  BPH (benign prostatic hyperplasia)  Hypertension  Chronic atrial fibrillation  Coronary artery disease  Hepatocellular carcinoma  DM (diabetes mellitus)  HTN (hypertension)  Paroxysmal atrial fibrillation  Cirrhosis  HCC (hepatocellular carcinoma)  History of BPH  History of laparoscopic cholecystectomy  History of lumbar laminectomy  H/O prior ablation treatment  H/O percutaneous left heart catheterization    Vital Signs Last 24 Hrs  T(C): 36 (15 Dec 2024 07:00), Max: 36.5 (14 Dec 2024 15:00)  T(F): 96.8 (15 Dec 2024 07:00), Max: 97.7 (14 Dec 2024 15:00)  HR: 98 (15 Dec 2024 10:00) (85 - 109)  BP: 140/85 (14 Dec 2024 20:00) (140/85 - 140/85)  BP(mean): 106 (14 Dec 2024 20:00) (106 - 106)  RR: 22 (15 Dec 2024 10:00) (13 - 27)  SpO2: 100% (15 Dec 2024 10:00) (96% - 100%)    Parameters below as of 15 Dec 2024 07:00  Patient On (Oxygen Delivery Method): ventilator    O2 Concentration (%): 30    I&O's Summary    14 Dec 2024 07:01  -  15 Dec 2024 07:00  --------------------------------------------------------  IN: 3233.4 mL / OUT: 3815 mL / NET: -581.6 mL    15 Dec 2024 07:01  -  15 Dec 2024 11:06  --------------------------------------------------------  IN: 258.3 mL / OUT: 485 mL / NET: -226.7 mL                        9.2    10.93 )-----------( 17       ( 15 Dec 2024 06:03 )             27.8     12-15    138  |  104  |  45[H]  ----------------------------<  241[H]  4.5   |  18[L]  |  0.91    Ca    7.1[L]      15 Dec 2024 06:03  Phos  3.9     12-15  Mg     2.4     12-15    TPro  3.7[L]  /  Alb  3.2[L]  /  TBili  17.1[H]  /  DBili  x   /  AST  43[H]  /  ALT  48[H]  /  AlkPhos  144[H]  12-15    Culture - Bronchial (collected 12-14-24 @ 03:17)  Source: Combi-Cath  Gram Stain (12-14-24 @ 14:01):    Numerous polymorphonuclear leukocytes per low power field    No Squamous epithelial cells per low power field    No organisms seen per oil power field  Preliminary Report (12-15-24 @ 08:46):    No growth to date    Culture - Fungal, Other (collected 12-12-24 @ 18:00)  Source: .Other  Preliminary Report (12-14-24 @ 23:02):    Culture is being performed. Fungal cultures are held for 4 weeks.    ROS: Unable to assess patient is intubated, sedated    PHYSICAL EXAM:   Constitutional: intubated, sedated   Eyes:  PERRLA  ENMT: nc/at, no thrush   Neck: supple   Respiratory: CTA B/L  Cardiovascular: RRR  Gastrointestinal: incision clean/dry/intact + Ostomy pink   Genitourinary: Castro in place   Extremities: SCD's in place and working bilaterally, + LE edema  Neurological: intubated, sedated  Skin: no rashes, ulcerations, lesions

## 2024-12-15 NOTE — PROGRESS NOTE ADULT - NS ATTEND AMEND GEN_ALL_CORE FT
POD#6 s/p creation ileostomy and closure of abdomen; 4 weeks s/p OLT  Transaminases continue to improve, TBili remains elevated at 17  lactate stable <2  remains intubated on low dose dobutamine and amiodarone. Aortic balloon pump removed  echo showing improvement in EF. cont to monitor  wean off dobutamine  Immunosuppression - Cyclosporine and cellcept held. Start Solumedrol 20  +ileostomy functioning. Increase rate of tube feeds to goal  sepsis improving but remains critically ill. cont abx as per ID, f/u cultures and pathology.  f/u CT head, chest, abd/pel and scrotum to r/o abscess or infarct  Cont VAC over abdominal wound. Cont albumin replacement for large volume ascites drainage.  Ca and bicarb replacement  Cont CVVH as per nephrology. remains oliguric but making more urine  thrombocytopenia likely from sepsis and medication, Linezolid and ganciclovir held. Transfuse platelets as needed  check HIT panel

## 2024-12-15 NOTE — PROGRESS NOTE ADULT - SUBJECTIVE AND OBJECTIVE BOX
[facility] DIVISION OF KIDNEY DISEASES AND HYPERTENSION -- FOLLOW UP NOTE  --------------------------------------------------------------------------------  Chief Complaint:  s/p OLT 11/15/24 with oliguric MORAIMA in setting of cardiogenic/septic shock.    24 hour events/subjective: Pt. seen and examined. Pt. remains intubated and sedated.  CRRT stopped at 1am.  Received lasix, UO about 20cc/hr.       PAST HISTORY  --------------------------------------------------------------------------------  No significant changes to PMH, PSH, FHx, SHx, unless otherwise noted    ALLERGIES & MEDICATIONS  --------------------------------------------------------------------------------  Allergies    No Known Allergies    Intolerances        REVIEW OF SYSTEMS  --------------------------------------------------------------------------------    Unable to obtain      MEDICATIONS  (STANDING):  albumin human 25% IVPB 50 milliLiter(s) IV Intermittent every 6 hours  albuterol/ipratropium for Nebulization 3 milliLiter(s) Nebulizer every 6 hours  aMIOdarone Infusion 0.25 mG/Min (8.33 mL/Hr) IV Continuous <Continuous>  buDESOnide    Inhalation Suspension 0.5 milliGRAM(s) Inhalation every 12 hours  calcium gluconate IVPB 2 Gram(s) IV Intermittent once  cefepime   IVPB      cefepime   IVPB 1000 milliGRAM(s) IV Intermittent every 8 hours  chlorhexidine 0.12% Liquid 15 milliLiter(s) Oral Mucosa every 12 hours  chlorhexidine 2% Cloths 1 Application(s) Topical <User Schedule>  CRRT Treatment    <Continuous>  DOBUTamine Infusion 1 MICROgram(s)/kG/Min (2.81 mL/Hr) IV Continuous <Continuous>  fluconAZOLE IVPB 400 milliGRAM(s) IV Intermittent every 24 hours  furosemide   Injectable 80 milliGRAM(s) IV Push every 12 hours  insulin lispro (ADMELOG) corrective regimen sliding scale   SubCutaneous every 6 hours  insulin NPH human recombinant 3 Unit(s) SubCutaneous every 8 hours  methylPREDNISolone sodium succinate Injectable 16 milliGRAM(s) IV Push <User Schedule>  nystatin Powder 1 Application(s) Topical every 12 hours  pantoprazole  Injectable 40 milliGRAM(s) IV Push daily  Phoxillum Filtration BK 4 / 2.5 5000 milliLiter(s) (1500 mL/Hr) CRRT <Continuous>  PrismaSOL Filtration BGK 4 / 2.5 5000 milliLiter(s) (1250 mL/Hr) CRRT <Continuous>  PrismaSOL Filtration BGK 4 / 2.5 5000 milliLiter(s) (250 mL/Hr) CRRT <Continuous>  sodium chloride 3%  Inhalation 4 milliLiter(s) Inhalation every 6 hours    MEDICATIONS  (PRN):      Vital Signs Last 24 Hrs  T(C): 36.4 (15 Dec 2024 15:00), Max: 36.4 (14 Dec 2024 23:00)  T(F): 97.5 (15 Dec 2024 15:00), Max: 97.5 (14 Dec 2024 23:00)  HR: 105 (15 Dec 2024 15:00) (89 - 109)  BP: 140/85 (14 Dec 2024 20:00) (140/85 - 140/85)  BP(mean): 106 (14 Dec 2024 20:00) (106 - 106)  RR: 22 (15 Dec 2024 15:00) (13 - 34)  SpO2: 97% (15 Dec 2024 15:00) (96% - 100%)    Parameters below as of 15 Dec 2024 15:00  Patient On (Oxygen Delivery Method): ventilator    O2 Concentration (%): 30    I&O's Summary    14 Dec 2024 07:01  -  15 Dec 2024 07:00  --------------------------------------------------------  IN: 3233.4 mL / OUT: 3815 mL / NET: -581.6 mL    15 Dec 2024 07:01  -  15 Dec 2024 15:33  --------------------------------------------------------  IN: 1033.8 mL / OUT: 1120 mL / NET: -86.2 mL      Physical Exam:  Gen: critically ill, intubated and sedated.   Pulm: intubated.   CV: tachycardic, S1S2+  Abd: + distended. +incisions. +NGT in place. +ostomy   : +nichols catheter   MSK: 2+ pitting above knee including scrotum  Skin: Warm  Access: R IJ non-tunneled HD catheter being used for CRRT    LABS/STUDIES  --------------------------------------------------------------------------------                           9.0    11.52 )-----------( 37       ( 15 Dec 2024 12:16 )             27.6     12-15    137  |  103  |  54[H]  ----------------------------<  250[H]  4.5   |  18[L]  |  1.09    Ca    7.3[L]      15 Dec 2024 12:18  Phos  4.0     12-15  Mg     2.4     12-15    TPro  4.0[L]  /  Alb  3.3  /  TBili  16.7[H]  /  DBili  x   /  AST  46[H]  /  ALT  53[H]  /  AlkPhos  149[H]  12-15          Culture - Bronchial (collected 12-14-24 @ 03:17)  Source: Combi-Cath  Gram Stain (12-14-24 @ 14:01):    Numerous polymorphonuclear leukocytes per low power field    No Squamous epithelial cells per low power field    No organisms seen per oil power field  Preliminary Report (12-15-24 @ 08:46):    No growth to date    Culture - Fungal, Other (collected 12-12-24 @ 18:00)  Source: .Other  Preliminary Report (12-14-24 @ 23:02):    Culture is being performed. Fungal cultures are held for 4 weeks.

## 2024-12-15 NOTE — PROGRESS NOTE ADULT - ASSESSMENT
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 Pt underwent Y90 Sept, 2023 with favorable treatment response.s/p OLT 11/15/24     on 12/7/24 s/p Total abdominal colectomy with ileostomy  on 12/9 s/p ex-lap with ileostomy.    # Ileus/ischemic bowel  ileus for >1 week- Cdiff neg 12/1  S/p rectal decompression  # severe septic shock 12/6 and pancytopenia, lactic acidosis  # Ecoli bacteremia in context of above  received meropenem, linezolid/caspofungin + tobramycin x1 on 11/6  BC 12/6 positive  s/p colectomy on 12/6- noted dusky appearance- s/p repeat exlap with no new areas of necrosis  awaiting path from colon  # cardiogenic shock  on dobutamine- s/p impella  # LLL mucus plug  --Continue Cefepime 1g IV Q8H while on CVVHD  --Continue Metronidazole 500 mg IV Q12H through 12/14/24 to complete 7 days of empiric anaerobic coverage  --Fluconazole increased to 400 mg Q24H while on CVVHD  --Continue to follow CBC with diff  --Continue to follow temperature curve  --Follow up on preliminary blood cultures  --> follow combicath cultures 12/14    # new transplant collection on US 12/14   Collection with septation within the gallbladder fossa, measuring 2.7 x 1.6 x 1.5 cm.  likely too small for intervention but will need close monitoring with imaging repeats    #Testicular Erythema/Edema  Repeat Testicular US (12/12) 2.1 x0.9 x 3.2 cm focal, complex fluid collection within the soft tissues inferior to the right testicle, skin thickening c/w cellulitis  HSV/VZV PCR per dermatology negative  Dermatology fungal culture with no growth  --Urology with recommendation for repeat testicular US. follow results but would agree with repeating Ct A/P to reassess . If fluid is tracking from abdominal tract high risk of infected fluid collection secondary to intraabdominal infection and would then favor aspiration for cultures +/- drainage     #s/p OLT 11/15/24 CMV D?R+  EBV , toxo D?/R?  --Bactrim on hold given thrombocytopenia, unlikely to absorb atovaquone  --off ganciclovir now, If no thrombocytopenia recovery (with other adjustments) would need to switch to IV Letermovir  - CMV PCR on monday- please order    #Fever, sepsis, hypoxic respiratory failure s/p on mechanical ventilation  CT C/A/P Bilateral lower lobe consolidation with fluid and debris in the right lower lobe bronchi, concerning for aspiration pneumonia.  CMV PCR (11/24) 146 (low level CMV viremia - not likely contributing)  Adenovirus PCR (11/24) Negative  Cryptococcal Serum Ag (11/24) Negative  serum and bronch aspergillus galactomannan negative  WNV Serology and Serum PCR Negative  CT Chest (11/25) Groundglass opacities in the right lower and left upper lobes, possibly infectious in nature.  CT A/P (11/25) No acute process  s/p Meropenem 1 gram q 8hr (11/23 >11/29)          Thank you for involving us in the care of this patient  Transplant ID will continue to follow- Dr Carrero will follow  Please call or page with additional questions  Pager; #[phone]  Teams: from 8 am to 5 pm  Rachele Lewis MD

## 2024-12-15 NOTE — PROGRESS NOTE ADULT - ASSESSMENT
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 Post-op course c/b worsening mental status and acute hypoxic respiratory failure requiring multiple intubations/extubations, currently extubated (as of 11/26/24) and colonic ileus s/p several rounds of Relistor and Neostigmine with NGT and rectal tube currently in place now with septic shock of unclear etiology. Transplant nephrology consulted for metabolic acidosis and MORAIMA.    1. s/p OLT 11/15/24 with oliguric MORAIMA in setting of septic shock.  - Likely hemodynamically mediated in setting of cardiogenic and septic shock on multiple vasopressors and IABP.   - SCr on admission was 0.48 11/15/24  - Pt. oliguric, UOP today is 0 cc.   - Urinalysis trace ketones, protein 30, 17 casts.   - CT AP non-con: Bilateral renal cysts including cysts with peripheral calcification in the left kidney.  - Pt. initiated on CRRT 12/7/24- stopped today  - Agree pt may had third spacing but intravascular depletion in setting of low CVP and high drain output.  Agree with albumin and lasix to make ATN non oliguric.      2. Metabolic Acidosis   - AGMA in setting of septic shock, starvation ketosis, lactic acidosis and MORAIMA.  - BHB was 2.3, repeat BHB.

## 2024-12-15 NOTE — PROGRESS NOTE ADULT - SUBJECTIVE AND OBJECTIVE BOX
Follow Up:  E coli bacteremia    Interval History:  Remains intubated, afebrile, normal lactate, plat 37 today         Vital Signs Last 24 Hrs  T(C): 36.5 (14 Dec 2024 11:00), Max: 37 (14 Dec 2024 03:00)  T(F): 97.7 (14 Dec 2024 11:00), Max: 98.6 (14 Dec 2024 03:00)  HR: 95 (14 Dec 2024 11:15) (87 - 109)  BP: 170/79 (13 Dec 2024 12:00) (170/79 - 170/79)  BP(mean): 113 (13 Dec 2024 12:00) (113 - 113)  RR: 27 (14 Dec 2024 11:00) (14 - 27)  SpO2: 99% (14 Dec 2024 11:15) (93% - 99%)    Parameters below as of 14 Dec 2024 11:15  Patient On (Oxygen Delivery Method): ventilator      PHYSICAL EXAMINATION:  General: Intubated and Sedated  HEENT: +ETT  Neck: Supple  Cardiac: RRR, No M/R/G  Resp: CTAB, No Wh/Rh/Ra  Abdomen: +ileostomy, dressing over surgical site, NBS, NT/ND, No HSM, No rigidity or guarding  MSK: No LE edema. No Calf tenderness  Skin: No rashes or lesions. Skin is warm and dry to the touch.   Neuro: Intubated and Sedated  : +Testicular and penile edema with erythema. Dressing and ointment in place, no crepitation  Psych: Unable to assess - intubated and sedated

## 2024-12-16 NOTE — PROGRESS NOTE ADULT - ASSESSMENT
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 Pt underwent Y90 Sept, 2023 with favorable treatment response.s/p OLT 11/15/24     on 12/7/24 s/p Total abdominal colectomy with ileostomy  on 12/9 s/p ex-lap with ileostomy.    # Ileus/ischemic bowel  ileus for >1 week- Cdiff neg 12/1  S/p rectal decompression  # severe septic shock 12/6 and pancytopenia, lactic acidosis  # Ecoli bacteremia in context of above  received meropenem, linezolid/caspofungin + tobramycin x1 on 11/6  BC 12/6 positive  s/p colectomy on 12/6- noted dusky appearance- s/p repeat exlap with no new areas of necrosis  # cardiogenic shock  on dobutamine- s/p impella  # LLL mucus plug  --Continue Meropenem  --Continue Caspofungin  --Continue to follow CBC with diff  --Continue to follow temperature curve  --Follow up on preliminary blood cultures  --> follow combicath cultures 12/14 are negative    # new transplant collection on US 12/14   Collection with septation within the gallbladder fossa, measuring 2.7 x 1.6 x 1.5 cm.  likely too small for intervention but will need close monitoring with imaging repeats    #Testicular Erythema/Edema  Repeat Testicular US (12/12) 2.1 x0.9 x 3.2 cm focal, complex fluid collection within the soft tissues inferior to the right testicle, skin thickening c/w cellulitis  -no drainable collections seen  HSV/VZV PCR per dermatology negative  Dermatology fungal culture with no growth      #s/p OLT 11/15/24 CMV D?R+  EBV , toxo D?/R?  --Bactrim on hold given thrombocytopenia, unlikely to absorb atovaquone  --off ganciclovir now, If no thrombocytopenia recovery (with other adjustments) would need to switch to IV Letermovir  - send CMV PCR     #Fever, sepsis, hypoxic respiratory failure s/p on mechanical ventilation  CT C/A/P Bilateral lower lobe consolidation with fluid and debris in the right lower lobe bronchi, concerning for aspiration pneumonia.  CMV PCR (11/24) 146 (low level CMV viremia - not likely contributing)  Adenovirus PCR (11/24) Negative  Cryptococcal Serum Ag (11/24) Negative  serum and bronch aspergillus galactomannan negative  WNV Serology and Serum PCR Negative  CT Chest (11/25) Groundglass opacities in the right lower and left upper lobes, possibly infectious in nature.  CT A/P (11/25) No acute process  s/p Meropenem 1 gram q 8hr (11/23 >11/29)          Wes Carrero MD  Can be called via Teams  After 5pm/weekends [phone]

## 2024-12-16 NOTE — PROGRESS NOTE ADULT - ASSESSMENT
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     s/p OLT 11/15 with complicated post op course    [] Septic shock/Cardiogenic shock  [] s/p Colectomy 12/7 POD#9  [] RTOR for closure and Ileostomy creation   [] IAPB 12/7- removed 12/10: CTICU on board  - E coli Bacteremia (12/6) - on jeniffer/caspo -> switched to  cefepime/fluc. Completed 7days of emperic flagyl.  Received Tobra x 1 12/6 .   - Continue with cefepime due to leukocytosis.  - Fluc 400mg   - Neutropenia: received Neupogen 480mcg x2  - MORAIMA: CRRT started 12/7, stopped 12/15  - AMS; CT Head neg  - scrotal edema: repeat scrotal us; neg for abscess    [] s/p OLT POD #30  - trend LFT's  - Diet: increase TFs as needed  - Pain management: avoid narcotics  - Strict I&Os, nichols, wound vac placed 12/10   - US: L brachial DVT   - wound vac exchange for ileostomy (12/13)  - HIT panel neg (12/14)    [ ] Immunosuppression  - Tacrolimus stopped 11/18 in setting of AMS/Seizure, Started Cyclo 11/24  - Cyclo held, MMF held, Solumedrol 16mg daily  - PPx: Gancyclovir (HELD), bactrim (HELD) in setting of thrombocytopenia    [] lleus   -@ home on Linzess  - imaging with distended colon (likely opioid induced)  - s/p Neostigmine x 2  - s/p Relistor, last dose 12/1  - s/p colectomy with end ileostomy  (see above)  - Daily AXR     [] CMV viremia  - d/c gancylovir due to thrombocytopenia  - CMV PCR (12/11): neg  - FU CMV PCR    [ ] AMS  - Reintubated 11/20 Aspiration/hypoxia, extubated 11/24->[id number]--> extubated 11/26 -> [id number]  - EEG 11/30 negative, Neurology following  - BH following   - off tacro  - on keppra    [ ] HTN/ pAFib  - On Amiodarone, off dobutamine  - metoprolol held (hypotensive)   - Eliquis 5mg bid - held 12/6.   - Dr. 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     s/p OLT 11/15 with complicated post op course    [] Septic shock/Cardiogenic shock  [] s/p Colectomy 12/7 POD#9  [] RTOR for closure and Ileostomy creation   [] IAPB 12/7- removed 12/10: CTICU on board  - E coli Bacteremia (12/6) - on jeniffer/caspo -> switched to  cefepime/fluc. Completed 7days of emperic flagyl.  Received Tobra x 1 12/6 .   - c/w cefepime due to leukocytosis.  - Fluc 400mg   - Neutropenia: received Neupogen 480mcg x2  - MORAIMA: CRRT started 12/7, stopped 12/15  - AMS; CT Head neg  - scrotal edema: repeat scrotal us; neg for abscess  - Rising lactate 3.8: repeat BCx, urine Cx, and UA  - ID c/s to broaden antibiotic treatment  - Start meropenem    - Albumin 500 cc  - Vitamin K x 3 days  - Hold diuretics     [] s/p OLT POD #31  - trend LFT's  - Diet: increase TFs as needed  - Pain management: avoid narcotics  - Strict I&Os, nichols, wound vac placed 12/10   - US: L brachial DVT   - wound vac exchange for ileostomy (12/13)  - HIT panel neg (12/14)    [ ] Immunosuppression  - Tacrolimus stopped 11/18 in setting of AMS/Seizure, Started Cyclo 11/24  - Cyclo held, MMF held, Solumedrol 16mg daily  - PPx: Gancyclovir (HELD), bactrim (HELD) in setting of thrombocytopenia    [] lleus   -@ home on Linzess  - imaging with distended colon (likely opioid induced)  - s/p Neostigmine x 2  - s/p Relistor, last dose 12/1  - s/p colectomy with end ileostomy  (see above)  - Daily AXR     [] CMV viremia  - d/c gancylovir due to thrombocytopenia  - CMV PCR (12/11): neg  - FU CMV PCR    [ ] AMS  - Reintubated 11/20 Aspiration/hypoxia, extubated 11/24->[id number]--> extubated 11/26 -> [id number]  - EEG 11/30 negative, Neurology following  - BH following   - off tacro  - on keppra    [ ] HTN/ pAFib  - On Amiodarone, off dobutamine  - metoprolol held (hypotensive)   - Eliquis 5mg bid - held 12/6.   - Dr. Quintanilla following    [ ] DM  - ISS     []Scrotal abscess   - US (12/12): 2.1 x0.9 x 3.2 cm focal, right testicle- possible abscess (12/12)  - Urology c/s recs nothing to do, repeat ultrasound   - 12/15 CT CAP no acute changes

## 2024-12-16 NOTE — CONSULT NOTE ADULT - SUBJECTIVE AND OBJECTIVE BOX
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 Pt underwent Y90 Sept, 2023 with favorable treatment response.Pt completed OLTx evaluation and listed for OLTx 5/03/2024     PAST MEDICAL & SURGICAL HISTORY:  Diabetes  Transaminitis  Paroxysmal atrial fibrillation  Depression  BPH (benign prostatic hyperplasia)  Hypertension  Chronic atrial fibrillation  Coronary artery disease  Hepatocellular carcinoma  DM (diabetes mellitus)  HTN (hypertension)  Paroxysmal atrial fibrillation  Cirrhosis  HCC (hepatocellular carcinoma)  History of BPH  History of laparoscopic cholecystectomy  History of lumbar laminectomy  H/O prior ablation treatment  H/O percutaneous left heart catheterization    MEDICATIONS  (STANDING):  albuterol/ipratropium for Nebulization 3 milliLiter(s) Nebulizer every 6 hours  aMIOdarone Infusion 0.25 mG/Min (8.33 mL/Hr) IV Continuous <Continuous>  buDESOnide    Inhalation Suspension 0.5 milliGRAM(s) Inhalation every 12 hours  caspofungin IVPB      chlorhexidine 0.12% Liquid 15 milliLiter(s) Oral Mucosa every 12 hours  chlorhexidine 2% Cloths 1 Application(s) Topical <User Schedule>  insulin regular Infusion 3 Unit(s)/Hr (3 mL/Hr) IV Continuous <Continuous>  meropenem  IVPB 1000 milliGRAM(s) IV Intermittent every 8 hours  methylPREDNISolone sodium succinate Injectable 16 milliGRAM(s) IV Push <User Schedule>  nystatin Powder 1 Application(s) Topical every 12 hours  pantoprazole  Injectable 40 milliGRAM(s) IV Push daily  phytonadione  IVPB 10 milliGRAM(s) IV Intermittent daily  sodium chloride 3%  Inhalation 4 milliLiter(s) Inhalation every 6 hours    Allergies: No Known Allergies    *Last Dental Visit: ~1 year ago    EOE:  TMJ (-) clicks                     (-) pops                     (-) crepitus             Mandible FROM             Facial bones and MOM grossly intact             (-) trismus             (-) lymphadenopathy             (-) swelling             (-) asymmetry             (-) palpation             (-) dyspnea             (-) dysphagia             (-) loss of consciousness    IOE:  permanent dentition: multiple missing            hard/soft palate:  (-) palatal torus           tongue/FOM No pathology noted           labial/buccal mucosa No pathology noted           (-) percussion           (-) palpation           (-) swelling            (-) abscess           (-) sinus tract    Dentition: #21, 22, 23, 24 non mobile cervical buccal class V abfractions  Mobility: none   Caries: multiple      *DENTAL RADIOGRAPHS: none    *ASSESSMENT: #21,22,23,24 non-carious, non-mobile, asymptomatic, no acute clinical signs of odontogenic infection or teeth that are an aspiration risk. No emergent treatment is indicated at this time.    *PLAN: Follow up with outside dentist PRN    PROCEDURE: limited bedside clinical exam    RECOMMENDATIONS:  Dental F/U with outpatient dentist for comprehensive dental care.   [facility]  Dental Medicine  [address] [place] [address]  Tel [phone]  Fax [phone]    Wolfgang Espinal DDS [id number]  Attending: Dr. 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 Pt underwent Y90 Sept, 2023 with favorable treatment response.Pt completed OLTx evaluation and listed for OLTx 5/03/2024     PAST MEDICAL & SURGICAL HISTORY:  Diabetes  Transaminitis  Paroxysmal atrial fibrillation  Depression  BPH (benign prostatic hyperplasia)  Hypertension  Chronic atrial fibrillation  Coronary artery disease  Hepatocellular carcinoma  DM (diabetes mellitus)  HTN (hypertension)  Paroxysmal atrial fibrillation  Cirrhosis  HCC (hepatocellular carcinoma)  History of BPH  History of laparoscopic cholecystectomy  History of lumbar laminectomy  H/O prior ablation treatment  H/O percutaneous left heart catheterization    MEDICATIONS  (STANDING):  albuterol/ipratropium for Nebulization 3 milliLiter(s) Nebulizer every 6 hours  aMIOdarone Infusion 0.25 mG/Min (8.33 mL/Hr) IV Continuous <Continuous>  buDESOnide    Inhalation Suspension 0.5 milliGRAM(s) Inhalation every 12 hours  caspofungin IVPB      chlorhexidine 0.12% Liquid 15 milliLiter(s) Oral Mucosa every 12 hours  chlorhexidine 2% Cloths 1 Application(s) Topical <User Schedule>  insulin regular Infusion 3 Unit(s)/Hr (3 mL/Hr) IV Continuous <Continuous>  meropenem  IVPB 1000 milliGRAM(s) IV Intermittent every 8 hours  methylPREDNISolone sodium succinate Injectable 16 milliGRAM(s) IV Push <User Schedule>  nystatin Powder 1 Application(s) Topical every 12 hours  pantoprazole  Injectable 40 milliGRAM(s) IV Push daily  phytonadione  IVPB 10 milliGRAM(s) IV Intermittent daily  sodium chloride 3%  Inhalation 4 milliLiter(s) Inhalation every 6 hours    Allergies: No Known Allergies    *Last Dental Visit: ~1 year ago    EOE:  TMJ (-) clicks                     (-) pops                     (-) crepitus             Mandible FROM             Facial bones and MOM grossly intact             (-) trismus             (-) lymphadenopathy             (-) swelling             (-) asymmetry             (-) palpation             (-) dyspnea             (-) dysphagia             (-) loss of consciousness    IOE:  permanent dentition: multiple missing teeth           hard/soft palate:  (-) palatal torus           tongue/FOM No pathology noted           labial/buccal mucosa No pathology noted           (-) percussion           (-) palpation           (-) swelling            (-) abscess           (-) sinus tract    Dentition: #21, 22, 23, 24 non-mobile cervical buccal class V abfractions  Mobility: none   Caries: no gross caries       *DENTAL RADIOGRAPHS: none    *ASSESSMENT: #21,22,23,24 non-carious, non-mobile, asymptomatic, no acute clinical signs of odontogenic infection or teeth that are an aspiration risk. No emergent treatment is indicated at this time.    *PLAN: Follow up with outside dentist PRN    PROCEDURE: limited bedside clinical exam    RECOMMENDATIONS:  Dental F/U with outpatient dentist for comprehensive dental care.   [facility]  Dental Medicine  [address] [place] [id number]  Tel [phone]  Fax [phone]    Wolfgang Espinal DDS [id number]  Attending: Dr. Mckinney

## 2024-12-16 NOTE — ADVANCED PRACTICE NURSE CONSULT - RECOMMEDATIONS
Will recommend:  1. Monitor output.  2. Empty pouch when 1/3-1/2 full   3. Change pouching system every 3-4 days & prn leakage  4. Contact ostomy specialists if questions, concerns/issues .  5. Supplies: [place] 2 1/4" Ceraplus flat skin barrier (#89577), Lianne 2 1/4" drainable pouch (#90661); Accessory products:  stoma paste #[id number], stoma powder (#8886) & Cavilon No sting barrier film wipe (#1006)  Will f/u for ostomy education when appropriate. Pt is currently sedated/ vented.

## 2024-12-16 NOTE — PROGRESS NOTE ADULT - ATTENDING COMMENTS
SICU night rounds    improved mental status per RN  tolerateing CPAP well, PRVC to sleep  clear 2.1 after bolus of albumin  s/p amio, on metop not tachycardic  NPO, tube feeds  adequate urine output, net neg 230cc but given severe anasarca, repeat bumex IV

## 2024-12-16 NOTE — PROGRESS NOTE ADULT - SUBJECTIVE AND OBJECTIVE BOX
Transplant Surgery - Multidisciplinary Rounds  --------------------------------------------------------------  OLT   11/15/2024        POD#31  Colectomy 12/7/2024   POD#9  IABP 12/7 removed 12/10    Present:   Patient seen and examined with multidisciplinary Transplant team including Surgeon: Dr. Vora, Dr. Sorto, JAZZ Garcia/Ascencion, Hepatologist: Dr. Malagon and ICU team in AM rounds.   Disciplines not in attendance will be notified of the plan.     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     s/p OLT 11/15 with post op course c/b:  ·	Hypoxia: Reintubated 11/20, extubated 11/24->reintubated 11/24, extubated 11/26, reintubated 12/7  ·	Ileus   ·	Fever  ·	A fib  ·	AMS/Seizure   ·	L brachial DVT  ·	Neutropenia  ·	E coli Bacteremia (12/6)  ·	s/p Coloctomy 12/7.   ·	IABP 12/7, removed 12/10    Interval/Overnight Events:   - afebrile, off pressors  - on amio, stopped dobutamine  - Stopped CRRT, poor response to lasix, better with metolazone/bumex  - scrotal edema: us neg for abscess  - liver us: new collection around GBF    Immunosuppression:  -Cyclo (held), MMF held, Solu 16  -ongoing monitoring for signs of rejection    Potential Discharge date: Pending clinical course  Education: intubated  Plan of care: See Below      TX DATA HERE      ROS: Unable to assess patient is intubated, sedated    PHYSICAL EXAM:   Constitutional: intubated, sedated   Eyes:  PERRLA  ENMT: nc/at, no thrush   Neck: supple   Respiratory: CTA B/L  Cardiovascular: RRR  Gastrointestinal: incision clean/dry/intact + Ostomy pink   Genitourinary: Castro in place   Extremities: SCD's in place and working bilaterally, + LE edema  Neurological: intubated, sedated  Skin: no rashes, ulcerations, lesions Transplant Surgery - Multidisciplinary Rounds  --------------------------------------------------------------  OLT   11/15/2024        POD#31  Colectomy 12/7/2024   POD#9  IABP 12/7 removed 12/10    Present:   Patient seen and examined with multidisciplinary Transplant team including Surgeon: Dr. Vora, Dr. Sorto, JAZZ Garcia/Ascencion, Hepatologist: Dr. Malagon and ICU team in AM rounds.   Disciplines not in attendance will be notified of the plan.     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     s/p OLT 11/15 with post op course c/b:  ·	Hypoxia: Reintubated 11/20, extubated 11/24->reintubated 11/24, extubated 11/26, reintubated 12/7  ·	Ileus   ·	Fever  ·	A fib  ·	AMS/Seizure   ·	L brachial DVT  ·	Neutropenia  ·	E coli Bacteremia (12/6)  ·	s/p Coloctomy 12/7.   ·	IABP 12/7, removed 12/10    Interval/Overnight Events:   - afebrile, off pressors  - on amio, stopped dobutamine  - Stopped CRRT, poor response to lasix, better with metolazone/bumex  - scrotal edema: us neg for abscess  - liver us: new collection around GBF    Immunosuppression:  -Cyclo (held), MMF held, Solu 16  -ongoing monitoring for signs of rejection    Potential Discharge date: Pending clinical course  Education: intubated  Plan of care: See Below    MEDICATIONS  (STANDING):  albuterol/ipratropium for Nebulization 3 milliLiter(s) Nebulizer every 6 hours  buDESOnide    Inhalation Suspension 0.5 milliGRAM(s) Inhalation every 12 hours  caspofungin IVPB      chlorhexidine 0.12% Liquid 15 milliLiter(s) Oral Mucosa every 12 hours  chlorhexidine 2% Cloths 1 Application(s) Topical <User Schedule>  insulin regular Infusion 3 Unit(s)/Hr (3 mL/Hr) IV Continuous <Continuous>  meropenem  IVPB 1000 milliGRAM(s) IV Intermittent every 8 hours  methylPREDNISolone sodium succinate Injectable 16 milliGRAM(s) IV Push <User Schedule>  nystatin Powder 1 Application(s) Topical every 12 hours  pantoprazole  Injectable 40 milliGRAM(s) IV Push daily  phytonadione  IVPB 10 milliGRAM(s) IV Intermittent daily  sodium chloride 3%  Inhalation 4 milliLiter(s) Inhalation every 6 hours    MEDICATIONS  (PRN):    PAST MEDICAL & SURGICAL HISTORY:  Diabetes    Transaminitis    Paroxysmal atrial fibrillation    Depression    BPH (benign prostatic hyperplasia)    Hypertension    Chronic atrial fibrillation    Coronary artery disease    Hepatocellular carcinoma    DM (diabetes mellitus)    HTN (hypertension)    Paroxysmal atrial fibrillation    Cirrhosis    HCC (hepatocellular carcinoma)    History of BPH    History of laparoscopic cholecystectomy    History of lumbar laminectomy    H/O prior ablation treatment    H/O percutaneous left heart catheterization    Vital Signs Last 24 Hrs  T(C): 37 (16 Dec 2024 15:00), Max: 37 (16 Dec 2024 15:00)  T(F): 98.6 (16 Dec 2024 15:00), Max: 98.6 (16 Dec 2024 15:00)  HR: 87 (16 Dec 2024 17:25) (87 - 109)  BP: 142/65 (16 Dec 2024 17:00) (142/65 - 166/77)  BP(mean): 93 (16 Dec 2024 17:00) (93 - 110)  RR: 14 (16 Dec 2024 17:00) (14 - 31)  SpO2: 99% (16 Dec 2024 17:25) (97% - 100%)    Parameters below as of 16 Dec 2024 17:25  Patient On (Oxygen Delivery Method): ventilator    I&O's Summary    15 Dec 2024 07:01  -  16 Dec 2024 07:00  --------------------------------------------------------  IN: 2312.4 mL / OUT: 3355 mL / NET: -1042.6 mL    16 Dec 2024 07:01  -  16 Dec 2024 18:38  --------------------------------------------------------  IN: 1772 mL / OUT: 1345 mL / NET: 427 mL                          9.1    13.76 )-----------( 31       ( 16 Dec 2024 18:09 )             28.1     12-16    140  |  104  |  88[H]  ----------------------------<  154[H]  4.2   |  18[L]  |  1.84[H]    Ca    7.6[L]      16 Dec 2024 18:09  Phos  4.6     12-16  Mg     2.3     12-16    TPro  3.8[L]  /  Alb  3.1[L]  /  TBili  17.6[H]  /  DBili  x   /  AST  83[H]  /  ALT  68[H]  /  AlkPhos  197[H]  12-16    Culture - Bronchial (collected 12-16-24 @ 08:36)  Source: Combi-Cath  Gram Stain (12-16-24 @ 16:27):    No polymorphonuclear leukocytes seen per low power field    No Squamous epithelial cells seen per low power field    No organisms seen per oil power field    Culture - Bronchial (collected 12-14-24 @ 03:17)  Source: Combi-Cath  Gram Stain (12-14-24 @ 14:01):    Numerous polymorphonuclear leukocytes per low power field    No Squamous epithelial cells per low power field    No organisms seen per oil power field  Final Report (12-15-24 @ 16:14):    Commensal charity consistent with body site    Culture - Fungal, Other (collected 12-12-24 @ 18:00)  Source: .Other  Preliminary Report (12-14-24 @ 23:02):    Culture is being performed. Fungal cultures are held for 4 weeks.      ROS: Unable to assess patient is intubated, sedated    PHYSICAL EXAM:   Constitutional: intubated, sedated   Eyes:  PERRLA  ENMT: nc/at, no thrush   Neck: supple   Respiratory: CTA B/L  Cardiovascular: RRR  Gastrointestinal: incision clean/dry/intact + Ostomy pink   Genitourinary: Castro in place   Extremities: SCD's in place and working bilaterally, + LE edema  Neurological: intubated, sedated  Skin: no rashes, ulcerations, lesions

## 2024-12-16 NOTE — ADVANCED PRACTICE NURSE CONSULT - RECOMMEDATIONS
Will Recommend:    1.) Topical therapy:   Bilateral buttocks and Sacrum - Cleanse  with soap and water.  Pat dry.  Apply triad paste twice a day and as needed.  2.) Incontinence Management - incontinence cleanser/ Incontinence pads.   Administer tera care per policy.  Apply Sondra ointment BID and PRN for incontinent episodes  3.) Maintain on an alternating air with low air loss surface  4.) Turn and reposition Q 2 hours  5.) Inspect the skin under the medical device q shift and may use dressing to reduce the risk of injury. Avoid excessive layers of dressing to decrease the area of pressure between the device and the skin.  6.) Offload heels/feet with complete cair air fluidized boots/pillows; ensure that the soles of the feet are not resting on the foot board of the bed.  7.) Waffle chair cushion for chair sitting   8.) Pain management as needed  9.) Elevate scrotum and use intra dry fabric dressing under the scrotum / b/l groin to prevent moisture related skin breakdown.  10.)  Sween cream to the generalized area of dryness.     Care as per SICU team.  Will not actively follow but will remain available. Please recall for new issues or deterioration.  Upon discharge f/u as outpatient at [facility] [address] [place] [address] [phone]    Thank you for this consult    Brooke Damian RN BSN - Wound/Ostomy via TEAMS   Will Recommend:    1.) Topical therapy:   Bilateral buttocks and Sacrum - Cleanse  with soap and water.  Pat dry.  Apply triad paste twice a day and as needed.  2.) Incontinence Management - incontinence cleanser/ Incontinence pads.   Administer tera care per policy.  Apply Sondra ointment BID and PRN for incontinent episodes  3.) Maintain on an alternating air with low air loss surface  4.) Turn and reposition Q 2 hours  5.) Inspect the skin under the medical device q shift and may use dressing to reduce the risk of injury. Avoid excessive layers of dressing to decrease the area of pressure between the device and the skin.  6.) Offload heels/feet with complete cair air fluidized boots/pillows; ensure that the soles of the feet are not resting on the foot board of the bed.  7.) Waffle chair cushion for chair sitting   8.) Pain management as needed  9.) Elevate scrotum and use intra dry fabric dressing under the scrotum / b/l groin to prevent moisture related skin breakdown.  10.)  Sween cream to the generalized area of dryness.   11.)  Apply Foam/Allevyn to the right upper back suspected DTI for cushioning/padding.    Care as per SICU team.  Will not actively follow but will remain available. Please recall for new issues or deterioration.  Upon discharge f/u as outpatient at [facility] [address] [place] [address] [phone]    Thank you for this consult    Brooke Damian RN BSN - Wound/Ostomy via TEAMS

## 2024-12-16 NOTE — CHART NOTE - NSCHARTNOTEFT_GEN_A_CORE
Urology consulted for findings of scrotal edema with skin breakdown. On examination patient found to have significant scrotal edema with denued epidermis and erythematous scrotum. On exam patient with no evidence of crepitus so no concern for necrotizing infection requiring surgical intervention. Patient has had multiple scrotal US's all showing significant scrotal wall edema but on second US there was question of possible abscess/high density material adjacent to the L testicle. US repeated since and shows no evidence of collection. No surgical intervention required at this time. Recommend continued wound care and f/u derm recommendations.     No further recommendations from urologic perspective, urology to sign off please reconsult as needed. For reconsults page [phone].     [facility]  [address] [place] [address], [place] [address]  [phone]

## 2024-12-16 NOTE — CHART NOTE - NSCHARTNOTEFT_GEN_A_CORE
NUTRITION FOLLOW UP NOTE    PATIENT SEEN FOR: transfer back to SICU; s/p OLT    SOURCE: [] Patient  [x] Current Medical Record  [x] RN  [] Family/support person at bedside  [x] Patient unavailable/inappropriate  [x] Other: Team    CHART REVIEWED/EVENTS NOTED.  [x] Nutrition Status:  -Intubated    -Post-op ileus; NGT to suction removed   -KO feed remains in place for enteral nutrition   -CRRT now off; pending plan from Nephro team   -Currently CPAPing for exercise    DIET ORDER:   Diet, NPO with Tube Feed:   Tube Feeding Modality: Nasogastric  Nepro with Carb Steady (NEPRORTH)  Total Volume for 24 Hours (mL): 1200  Continuous  Starting Tube Feed Rate {mL per Hour}: 40  Increase Tube Feed Rate by (mL): 10     Every 4 hours  Until Goal Tube Feed Rate (mL per Hour): 50  Tube Feed Duration (in Hours): 24  Tube Feed Start Time: 20:00 (-24)    ENTERAL NUTRITION  EN Order Provides:  1200ml formula, 2124kcal, 97g protein, 872ml free water; meets 26 kcal/kg and 1.2 g protein/kg, based on wt: 80.7kg - does not meet estimated energy needs     Current Pump Rate:  EN provision: goal obtained on 12/15    ANTHROPOMETRICS:  Drug Dosing Weight  Height (cm): 182.9 (09 Dec 2024 10:11)  Weight (kg): 93.6 (09 Dec 2024 10:11)  BMI (kg/m2): 28 (09 Dec 2024 10:11)  Daily Weight in k.6 (12-11), Weight in k (12-10)     NUTRITIONALLY PERTINENT MEDICATIONS:  MEDICATIONS  (STANDING):  aMIOdarone Infusion  calcium gluconate IVPB  cefepime   IVPB  cefepime   IVPB  fluconAZOLE IVPB  insulin regular Infusion  methylPREDNISolone sodium succinate Injectable  pantoprazole  Injectable       NUTRITIONALLY PERTINENT LABS:  12-16 Na139 mmol/L Glu 131 mg/dL[H] K+ 4.0 mmol/L Cr  1.76 mg/dL[H] BUN 82 mg/dL[H] 12-16 Phos 4.2 mg/dL 12-16 Alb 3.5 g/dL12-16 ALT 62 U/L[H] AST 76 U/L[H] Alkaline Phosphatase 186 U/L[H]      Finger Sticks:  POCT Blood Glucose.: 121 mg/dL ( @ 13:01)  POCT Blood Glucose.: 123 mg/dL ( @ 11:55)  POCT Blood Glucose.: 116 mg/dL ( @ 10:58)  POCT Blood Glucose.: 103 mg/dL ( @ 10:17)  POCT Blood Glucose.: 126 mg/dL ( @ 08:58)  POCT Blood Glucose.: 124 mg/dL ( @ 08:06)  POCT Blood Glucose.: 143 mg/dL ( @ 06:59)  POCT Blood Glucose.: 166 mg/dL ( @ 05:55)  POCT Blood Glucose.: 197 mg/dL ( @ 04:55)  POCT Blood Glucose.: 235 mg/dL ( @ 03:58)  POCT Blood Glucose.: 274 mg/dL ( @ 03:01)  POCT Blood Glucose.: 305 mg/dL ( @ 02:06)  POCT Blood Glucose.: 309 mg/dL ( @ 00:58)  POCT Blood Glucose.: 318 mg/dL ( @ 00:09)  POCT Blood Glucose.: 332 mg/dL (12-15 @ 23:31)  POCT Blood Glucose.: 273 mg/dL (12-15 @ 18:19)  POCT Blood Glucose.: 236 mg/dL (12-15 @ 14:35)      NUTRITIONALLY PERTINENT MEDICATIONS/LABS:  [x] Reviewed  [x] Relevant notes on medications/labs:  -Insulin drip   -Solu-medrol      EDEMA:  [x] Reviewed  [] Relevant notes:    GI/ I&O:  [x] Reviewed  -Ostomy: 550cc out (12/15)   -NGT to suction --> 80cc out (12/15)    SKIN:   [x] Pressure injury previously noted  -Sacral/Spine: Suspected deep tissue injury  -R heel: deep tissue injury  -L heel: suspected deep tissue injury   [x] Change in pressure injury documentation:  -R upper back: Suspected deep tissue injury      ESTIMATED NEEDS:  [x] No change:  Energy:   2178-2582kcal/day (27-32 kcal/kg)  Protein:   97-121g/day (1.2-1.5 g/kg)  Fluid:   ml/day or [x] defer to team  Based on: 80.7kg   Monitor vent settings    NUTRITION DIAGNOSIS:  1. Increased protein-energy needs   2. Severe malnutrition (Acute)    EDUCATION:  [] Yes:  [x] Not appropriate/warranted    NUTRITION CARE PLAN:  1. Diet:   -As medically feasible, recommend Nepro @ 55mL x 24hrs providing 1320mL of formula, 2336kcal/day (29kcal/kg), 107g protein/day (1.3g/kg), 960mL free water - based on 80.7kg   -If iHD not indicated and lytes stay WDL, consider switching formulas to non-renal formula  2. Supplements: N/A  3. Multivitamin/mineral supplementation: Nephro-kole    MONITORING AND EVALUATION:   RD remains available upon request and will follow up per protocol.    Malorie Pike, MS, RDN, CDN (Teams)   Available on [place] TEAMS

## 2024-12-16 NOTE — PROGRESS NOTE ADULT - ASSESSMENT
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 Post-op course c/b worsening mental status and acute hypoxic respiratory failure requiring multiple intubations/extubations, currently extubated (as of 11/26/24) and colonic ileus s/p several rounds of Relistor and Neostigmine with NGT and rectal tube currently in place now with septic shock of unclear etiology. Transplant nephrology consulted for metabolic acidosis and MORAIMA.    1. s/p OLT 11/15/24 with oliguric MORAIMA in setting of septic shock.  - Likely hemodynamically mediated in setting of cardiogenic and septic shock on multiple vasopressors and IABP.   - SCr on admission was 0.48 11/15/24  - Pt. is non-oliguric, UOP today is 730 cc in 24 hours on lasix 80mg x3 on 12/15/24.   - Urinalysis trace ketones, protein 30, 17 casts.   - CT AP non-con: Bilateral renal cysts including cysts with peripheral calcification in the left kidney.  - Pt. initiated on CRRT 12/7/24- 12/15/24 at 1AM.     2. Metabolic Acidosis   - AGMA in setting of septic shock, starvation ketosis, lactic acidosis and MORAIMA.  - BHB was 2.3, not repeated.     If you have any questions, please feel free to contact me:  Magaly Tello MD PGY-4  Nephrology Fellow  [employer] Teams (Preferred)/ Pager [phone]   (After 5pm or on weekends please page the on-call fellow)

## 2024-12-16 NOTE — PROGRESS NOTE ADULT - ASSESSMENT
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 Patient's post-op course has been c/b multiple reintubation, total colectomy w/ ileostomy, and IABP placement 2/2 cardiogenic shock with subsequent removal.     PLAN  Neuro:  - Follows commands, off sedation  - pain control w/ oxycodone  - CT head 11/19, 11/21, 11/25, 11/29, 12/5 negative  - EEG: Mild diffuse cerebral dysfunction that is not specific in etiology. No epileptic discharges recorded. No seizures recorded.  - Holding home xanax, Abilify, Zoloft, Remeron, Lexapro     Resp:  -PRVC 14/500/5/30  -SBT daily  -c/w inhaled bronchodilator  -VBG daily    CV:  -IABP removed 12/10  - Dobutamine d/c'ed 12/15  -amiodarone gtt  -trend lactate  -hold home metoprolol  -TTE 12/11 shows EF of 55-60%    GI:  -trend LFTs  -NPO w/ tube feeds  -protonix QD  -high output from ALYSSA drains, continue to monitor quantity & quality    /Renal:  -c/w CRRT goal net -100  -Minimal response to lasix 12/15, given bumex 2mg and metolazone 10mg x1  -monitor BUN/Cr  -I&Os, trend UOP  -elevate scrotum i/s/o edema   -repeat scrotal US tomorrow as per urology    Heme:  -trend H/H  -monitor coags    ID:  -ABx w/ cefepime, fluconazole   -monitor WBC, fever curve  -CMV PPx w/ ganciclovir (holding now iso thrombocytopenia)  -holding cellcept, cyclosporine    Endo:  -monitor glucose   -SDS   -Insulin gtt 12/16    Lines:   -NGT x 2  -nichols  -R IJ (12/7)  -L IJ (12/7)  -ALYSSA x 2

## 2024-12-16 NOTE — PROGRESS NOTE ADULT - SUBJECTIVE AND OBJECTIVE BOX
[facility] DIVISION OF KIDNEY DISEASES AND HYPERTENSION -- FOLLOW UP NOTE  --------------------------------------------------------------------------------  Chief Complaint: s/p OLT 11/15/24 with oliguric MORAIMA in setting of cardiogenic/septic shock.    24 hour events/subjective: Pt. seen and examined at bedside in SICU earlier today. Remains intubated and sedated. No longer on pressors support and made 730 cc in 24 hours.       PAST HISTORY  --------------------------------------------------------------------------------  No significant changes to PMH, PSH, FHx, SHx, unless otherwise noted    ALLERGIES & MEDICATIONS  --------------------------------------------------------------------------------  Allergies    No Known Allergies    Intolerances      Standing Inpatient Medications  albuterol/ipratropium for Nebulization 3 milliLiter(s) Nebulizer every 6 hours  aMIOdarone Infusion 0.25 mG/Min IV Continuous <Continuous>  buDESOnide    Inhalation Suspension 0.5 milliGRAM(s) Inhalation every 12 hours  calcium gluconate IVPB 2 Gram(s) IV Intermittent once  cefepime   IVPB      cefepime   IVPB 1000 milliGRAM(s) IV Intermittent every 8 hours  chlorhexidine 0.12% Liquid 15 milliLiter(s) Oral Mucosa every 12 hours  chlorhexidine 2% Cloths 1 Application(s) Topical <User Schedule>  fluconAZOLE IVPB 400 milliGRAM(s) IV Intermittent every 24 hours  insulin regular Infusion 3 Unit(s)/Hr IV Continuous <Continuous>  methylPREDNISolone sodium succinate Injectable 16 milliGRAM(s) IV Push <User Schedule>  nystatin Powder 1 Application(s) Topical every 12 hours  pantoprazole  Injectable 40 milliGRAM(s) IV Push daily  sodium chloride 3%  Inhalation 4 milliLiter(s) Inhalation every 6 hours    PRN Inpatient Medications      REVIEW OF SYSTEMS  --------------------------------------------------------------------------------  Unable to obtain ROS due to current clinical status.     VITALS/PHYSICAL EXAM  --------------------------------------------------------------------------------  T(C): 36.9 (12-16-24 @ 11:00), Max: 36.9 (12-15-24 @ 23:00)  HR: 97 (12-16-24 @ 13:00) (93 - 109)  BP: 146/67 (12-15-24 @ 19:00) (146/67 - 146/67)  RR: 17 (12-16-24 @ 13:00) (17 - 31)  SpO2: 100% (12-16-24 @ 13:00) (97% - 100%)  Wt(kg): --        12-15-24 @ 07:01  -  12-16-24 @ 07:00  --------------------------------------------------------  IN: 2312.4 mL / OUT: 3355 mL / NET: -1042.6 mL    12-16-24 @ 07:01  -  12-16-24 @ 13:41  --------------------------------------------------------  IN: 1115.8 mL / OUT: 680 mL / NET: 435.8 mL        Physical Exam:  Gen: critically ill, intubated and sedated.   Pulm: intubated.   CV: tachycardic, S1S2+  Abd: + distended. +incisions. +NGT in place. +ostomy   : +nichols catheter   MSK: 1+ pitting above knee including scrotum  Skin: Warm    LABS/STUDIES  --------------------------------------------------------------------------------              9.1    12.93 >-----------<  28       [12-16-24 @ 12:10]              27.1     139  |  104  |  82  ----------------------------<  131      [12-16-24 @ 12:10]  4.0   |  18  |  1.76        Ca     7.2     [12-16-24 @ 12:10]      Mg     2.3     [12-16-24 @ 12:10]      Phos  4.2     [12-16-24 @ 12:10]    TPro  3.9  /  Alb  3.5  /  TBili  16.6  /  DBili  x   /  AST  76  /  ALT  62  /  AlkPhos  186  [12-16-24 @ 12:10]    PT/INR: PT 22.1 , INR 1.93       [12-16-24 @ 06:09]  PTT: 49.8       [12-16-24 @ 06:09]      Creatinine Trend:  SCr 1.76 [12-16 @ 12:10]  SCr 1.59 [12-16 @ 06:09]  SCr 1.45 [12-16 @ 00:45]  SCr 1.25 [12-15 @ 18:25]  SCr 1.09 [12-15 @ 12:18]                Vitamin D (25OH) <6.0      [11-21-24 @ 08:32]  TSH 0.68      [12-12-24 @ 12:27]  Lipid: chol 114, , HDL 47, LDL --      [04-24-24 @ 06:48]

## 2024-12-16 NOTE — PROGRESS NOTE ADULT - SUBJECTIVE AND OBJECTIVE BOX
HPI:  Patient seen and examined at bedside in SICU.    Review Of Systems:     Unable to assess       Medications:  albuterol/ipratropium for Nebulization 3 milliLiter(s) Nebulizer every 6 hours  buDESOnide    Inhalation Suspension 0.5 milliGRAM(s) Inhalation every 12 hours  caspofungin IVPB      caspofungin IVPB 50 milliGRAM(s) IV Intermittent every 24 hours  chlorhexidine 0.12% Liquid 15 milliLiter(s) Oral Mucosa every 12 hours  chlorhexidine 2% Cloths 1 Application(s) Topical <User Schedule>  insulin regular Infusion 3 Unit(s)/Hr IV Continuous <Continuous>  meropenem  IVPB 1000 milliGRAM(s) IV Intermittent every 8 hours  methylPREDNISolone sodium succinate Injectable 16 milliGRAM(s) IV Push <User Schedule>  metoprolol tartrate 50 milliGRAM(s) Enteral Tube every 8 hours  nystatin Powder 1 Application(s) Topical every 12 hours  pantoprazole  Injectable 40 milliGRAM(s) IV Push daily  phytonadione  IVPB 10 milliGRAM(s) IV Intermittent daily    PAST MEDICAL & SURGICAL HISTORY:  Diabetes      Transaminitis      Paroxysmal atrial fibrillation      Depression      BPH (benign prostatic hyperplasia)      Hypertension      Chronic atrial fibrillation      Coronary artery disease      Hepatocellular carcinoma      DM (diabetes mellitus)      HTN (hypertension)      Paroxysmal atrial fibrillation      Cirrhosis      HCC (hepatocellular carcinoma)      History of BPH      History of laparoscopic cholecystectomy      History of lumbar laminectomy      H/O prior ablation treatment      H/O percutaneous left heart catheterization        Vitals:  T(C): 37.2 (12-16-24 @ 23:00), Max: 37.2 (12-16-24 @ 23:00)  HR: 87 (12-17-24 @ 01:00) (80 - 109)  BP: 130/60 (12-17-24 @ 01:00) (130/60 - 166/77)  BP(mean): 87 (12-17-24 @ 01:00) (87 - 110)  RR: 22 (12-17-24 @ 01:00) (14 - 30)  SpO2: 97% (12-17-24 @ 01:00) (97% - 100%)  Wt(kg): --  Daily     Daily   I&O's Summary    15 Dec 2024 07:01  -  16 Dec 2024 07:00  --------------------------------------------------------  IN: 2312.4 mL / OUT: 3355 mL / NET: -1042.6 mL    16 Dec 2024 07:01  -  17 Dec 2024 01:32  --------------------------------------------------------  IN: 2436.5 mL / OUT: 2705 mL / NET: -268.5 mL        Physical Exam:  Appearance: Intubated, critically ill   Eyes: PERRL, EOMI, pink conjunctiva  HENT: +ET tube, +NG tube  Cardiovascular: RRR, S1, S2, no murmurs, rubs, or gallops; no edema; no JVD  Respiratory: ventilator support  Gastrointestinal: soft, non-tender, non-distended with normal bowel sounds  Musculoskeletal: No clubbing; no joint deformity   Skin: sacral decub ulcer                          9.3    14.70 )-----------( 36       ( 17 Dec 2024 00:39 )             28.1     12-17    138  |  104  |  95[H]  ----------------------------<  210[H]  4.2   |  18[L]  |  1.99[H]    Ca    7.4[L]      17 Dec 2024 00:40  Phos  4.9     12-17  Mg     2.2     12-17    TPro  3.5[L]  /  Alb  3.0[L]  /  TBili  16.7[H]  /  DBili  x   /  AST  77[H]  /  ALT  72[H]  /  AlkPhos  220[H]  12-17    PT/INR - ( 16 Dec 2024 06:09 )   PT: 22.1 sec;   INR: 1.93 ratio         PTT - ( 16 Dec 2024 06:09 )  PTT:49.8 sec        Cardiovascular Diagnostic Testing:  ECG: sinus rhythm    Echo: < from: Intra-Operative Transesophageal Echo W or WO Ultrasound Enhancing Agent (11.15.24 @ 07:46) >  --------------------------------------------------------------------------------  PRE-BYPASS FINDINGS     Left Ventricle:  Left ventricular ejection fraction is estimated at 60 to 65%. Normal left ventricularwall thickness. The left ventricular systolic function is normal There are no regional wall motion abnormalities seen.     Right Ventricle:  The right ventricular cavity is normal in size, with normal wall thickness and right ventricular systolic function is normal. Right ventricular systolic function is normal.     Left Atrium:  The left atrium is normal in size. No thrombus visualized in left atrial appendage.     Right Atrium:  The right atrium is normal in size. The right atrium is normal in size.     Interatrial Septum:  No PFO visualized with color flow doppler.     Aortic Valve:  The aortic valve appears trileaflet. There is no aortic valve stenosis. There is trace aortic regurgitation.     Mitral Valve:  There is no mitral valve stenosis. There is no mitral valve stenosis. There is trace mitral regurgitation.     Tricuspid Valve:  There is no evidence of tricuspid stenosis. There is trace tricuspid regurgitation.     Pericardium:  No pericardial effusion seen.     Post-Bypass:  S/P OLT. Under current loading conditions, hyperdynamic left ventricular systolic function, EF: 70-75% by visual estimate, no RWMA. Normal right ventricular systolic function. All other findings unchanged. Probe removed atraumatically, no blood. The patient tolerated the procedure well and without complications. Permanent recorded images are stored in the medical record.     Electronically signed by James Villareal on 11/15/2024 at 2:18:16 PM         *** Final ***    < end of copied text >      < from: TTE Limited W or WO Ultrasound Enhancing Agent (12.11.24 @ 09:28) >  _______________________________________________________________________________________     CONCLUSIONS:      1. Endocardial visualization enhanced with Definity (Ultrasound Enhancing Agent).   2. Left ventricular systolic function is normal with an ejection fraction visually estimated at 55 to 60 %.   3. Enlarged right ventricular cavity size and mildly reduced right ventricular systolic function.   4. Device lead is visualized in the right heart.    ________________________________________________________________________________________    < end of copied text >      Stress Testing:     Cath: 4/24/2024, patient had his LHC repeated with Ben Villareal MD at [facility].  Cath showed multivessel coronary artery disease, but the lesions previously noted were FFR negative.  He continues to have MIRTA 3 flow throughout.    Interpretation of Telemetry: Sinus     Imaging:

## 2024-12-16 NOTE — PROGRESS NOTE ADULT - ASSESSMENT
74 year-old with known coronary artery disease as above presents for liver transplant.  Seen to have chest pain post transplant.  It was atypical of angina and has resolved.  Troponin remained negative.    PAF - currently sinus rhythm     Patient was septic on Friday, 12/6, and at that time, he was seen to be hyperdynamic with TODD and severe LVOT gradient.  Post ex-lap, patient seen to have newly reduced LVEF.  IABP placed. Inotrope and pressor support. IABP removed 12/10 without significant drop in cardiac output.  Recommend weaning both inotrope and pressors.   Keep MAP >65 mmHg. Monitor output via [place].    Heart Failure follow-up appreciated.

## 2024-12-16 NOTE — PROGRESS NOTE ADULT - ATTENDING COMMENTS
I have seen and examined this patient on multidisciplinary SICU rounds this morning. I have reviewed all new labs, imaging and reports. I have participated in formulating the plan for the day, and have read and agree with the history, ROS, exam, assessment and plan as stated above, with my additions listed below:      74 year old man with a history of HTN, DM, AFib, MASH cirrhosis complicated by HCC s/p OLT 11/15/24 with postop course complicated by ischemic colitis s/p total abdominal colectomy and end ileostomy, cardiogenic shock s/p IABP now removed, and recurrent respiratory failure s/p multiple re-intubations.     Weaned off nitric and dobutamine. Continues on SBT trials. Intermittently opens eyes, nods yes appropriately to questions, unable to flex neck off pillow or lift up head. WBC count up to 14 and lactate 3.8, this morning. Received additional IV fluid and antibiotics upgraded.     Neuro: weakness of critical illness, intermittently opening eyes, limited following of commands, twitching of right hand with no appreciable  strength   CV: amio drip   Pulm: mechanical ventilation, continue SBT as tolerated, rest overnight, nebs q6  FENGI: continue tube feeds via KO, d/c NGT  Heme/ID: continue meropenem and caspofungin, trend WBC count, immunosuppression per transplant    Endo: insulin drip, wean as tolerated, glucose < 180  PPx: PPI    Discussed extensively with the patient's son at bedside about the recommendation for tracheostomy. The patient has been tolerating pressure support trials up to 12 hours, but his main reason for needing a tracheostomy is his profound weakness and resulting inability to clear his secretions. Should he develop a significant mucous plug, the hypoxic arrest would almost certainly be fatal. He would likely be able to progress quickly to trach mask trials, but would require the tracheostomy for airway protection in the setting of poor secretion management. We discussed that he may require feeding access, but would continue with KO feeds at this time. He was in agreement with the procedure. Will follow up with him tomorrow pending the patient's clinical improvement (upgrading antibiotics, giving additional fluids for lactate bump). Discussed that the procedure would be percutaneous and at bedside.      The patient required critical care. Critical care time was indicated due to the patient's inherent instability and high risk for decompensation. E & M work was done by me in multiple 10-15 minute intervals over the course of the day in the ICU. I have coordinated care with multiple teams, and reviewed the medical record, consult notes, ICU flow sheets, cardiac monitoring, mechanical ventilation, medication record, brain and body imaging, and serial sequential labs.             Total time spent in the care of this patient today (excluding procedures): 60 minutes                     Over 50% of the total time was spent in discussion and coordination of care with consulting services, dietary and rehab services.      Courtney Sewell M.D.  Department of Trauma, Acute Care Surgery and Surgical Critical Care I have seen and examined this patient on multidisciplinary SICU rounds this morning. I have reviewed all new labs, imaging and reports. I have participated in formulating the plan for the day, and have read and agree with the history, ROS, exam, assessment and plan as stated above, with my additions listed below:      74 year old man with a history of HTN, DM, AFib, MASH cirrhosis complicated by HCC s/p OLT 11/15/24 with postop course complicated by ischemic colitis s/p total abdominal colectomy and end ileostomy, cardiogenic shock s/p IABP now removed, and recurrent respiratory failure s/p multiple re-intubations.     Weaned off nitric and dobutamine. Continues on SBT trials. Intermittently opens eyes, nods yes appropriately to questions, unable to flex neck off pillow or lift up head. WBC count up to 14 and lactate 3.8, this morning. Received additional IV fluid and antibiotics upgraded.     Neuro: weakness of critical illness, intermittently opening eyes, limited following of commands, twitching of right hand with no appreciable  strength   CV: amio drip   Pulm: mechanical ventilation, continue SBT as tolerated, rest overnight, nebs q6  FENGI: continue tube feeds via KO, d/c NGT  Heme/ID: continue meropenem and caspofungin, trend WBC count, immunosuppression per transplant    Endo: insulin drip, wean as tolerated, glucose < 180  PPx: PPI    Will remove left femoral arterial line and attempt left radial arterial line placement. If unable to be placed, will give an arterial line holiday. Oxygenation and ventilation have been adequate and he is currently off vasopressors. Discussed with son at bedside, who agreed.     Discussed extensively with the patient's son at bedside about the recommendation for tracheostomy. The patient has been tolerating pressure support trials up to 12 hours, but his main reason for needing a tracheostomy is his profound weakness and resulting inability to clear his secretions. Should he develop a significant mucous plug, the hypoxic arrest would almost certainly be fatal. He would likely be able to progress quickly to trach mask trials, but would require the tracheostomy for airway protection in the setting of poor secretion management. We discussed that he may require feeding access, but would continue with KO feeds at this time. He was in agreement with the procedure. Will follow up with him tomorrow pending the patient's clinical improvement (upgrading antibiotics, giving additional fluids for lactate bump). Discussed that the procedure would be percutaneous and at bedside.      The patient required critical care. Critical care time was indicated due to the patient's inherent instability and high risk for decompensation. E & M work was done by me in multiple 10-15 minute intervals over the course of the day in the ICU. I have coordinated care with multiple teams, and reviewed the medical record, consult notes, ICU flow sheets, cardiac monitoring, mechanical ventilation, medication record, brain and body imaging, and serial sequential labs.             Total time spent in the care of this patient today (excluding procedures): 60 minutes                     Over 50% of the total time was spent in discussion and coordination of care with consulting services, dietary and rehab services.      Courtney Sewell M.D.  Department of Trauma, Acute Care Surgery and Surgical Critical Care

## 2024-12-16 NOTE — ADVANCED PRACTICE NURSE CONSULT - ASSESSMENT
In at bedside with PT wound care Sharon Morgan for routine vac dsg & complete ostomy pouching system changes. (pls see separate notes).Chart reviewed & events noted to date.Pt in bed sedated /vented, son briefly at bedside, but left the room shortly.  Complete pouching system changed. Stoma 1 5/8" red & viable, scant liquid bilious effluent noted in pouch. Some bleeding noted at mucocutaneous junction controlled by gentle pressure. Peristomal skin & mucocutaneous junction intact. + creases/skin indents at 3&9 o'clock. Repouched w/ 2 1/4" flat skin barrier, bead of stoma paste applied to skin creases to level surface & at the back of skin barrier near opening to caulk & drainable pouch. Supplies with pattern left at bedside.

## 2024-12-16 NOTE — PROGRESS NOTE ADULT - SUBJECTIVE AND OBJECTIVE BOX
SICU Daily Progress Note  =====================================================  Interval/Overnight Events:     - Minimal response to lasix, given bumex 2mg and metozalone 10mg   - Dobutamine d/c'ed  - Elevated BG -> insulin gtt  - Scrotal US -> edema, no abscess  - Dental consult in AM      Allergies: No Known Allergies      MEDICATIONS:   --------------------------------------------------------------------------------------  Neurologic Medications    Respiratory Medications  albuterol/ipratropium for Nebulization 3 milliLiter(s) Nebulizer every 6 hours  buDESOnide    Inhalation Suspension 0.5 milliGRAM(s) Inhalation every 12 hours  sodium chloride 3%  Inhalation 4 milliLiter(s) Inhalation every 6 hours    Cardiovascular Medications  aMIOdarone Infusion 0.25 mG/Min IV Continuous <Continuous>  furosemide   Injectable 80 milliGRAM(s) IV Push every 12 hours    Gastrointestinal Medications  pantoprazole  Injectable 40 milliGRAM(s) IV Push daily    Genitourinary Medications    Hematologic/Oncologic Medications    Antimicrobial/Immunologic Medications  cefepime   IVPB      cefepime   IVPB 1000 milliGRAM(s) IV Intermittent every 8 hours  fluconAZOLE IVPB 400 milliGRAM(s) IV Intermittent every 24 hours    Endocrine/Metabolic Medications  insulin regular Infusion 3 Unit(s)/Hr IV Continuous <Continuous>  methylPREDNISolone sodium succinate Injectable 16 milliGRAM(s) IV Push <User Schedule>    Topical/Other Medications  chlorhexidine 0.12% Liquid 15 milliLiter(s) Oral Mucosa every 12 hours  chlorhexidine 2% Cloths 1 Application(s) Topical <User Schedule>  nystatin Powder 1 Application(s) Topical every 12 hours    --------------------------------------------------------------------------------------    VITAL SIGNS, INS/OUTS (last 24 hours):  --------------------------------------------------------------------------------------  T(C): 36.9 (12-15-24 @ 23:00), Max: 36.9 (12-15-24 @ 23:00)  HR: 104 (12-16-24 @ 00:00) (89 - 109)  BP: 146/67 (12-15-24 @ 19:00) (146/67 - 146/67)  RR: 26 (12-16-24 @ 00:00) (13 - 34)  SpO2: 98% (12-16-24 @ 00:00) (97% - 100%)    12-14-24 @ 07:01  -  12-15-24 @ 07:00  --------------------------------------------------------  IN: 3233.4 mL / OUT: 3815 mL / NET: -581.6 mL    12-15-24 @ 07:01  -  12-16-24 @ 00:49  --------------------------------------------------------  IN: 1876.6 mL / OUT: 2170 mL / NET: -293.4 mL      --------------------------------------------------------------------------------------    EXAM  NEUROLOGY  Exam: Moves extremities, opens eyes    RESPIRATORY  Exam: On mechanical ventilation [facility] 14/500/5/30    CARDIOVASCULAR  Exam: Regular rate and rhythm.       GI/NUTRITION  Exam: Abdomen soft, Non-tender, Non-distended, ostomy pink      LABS  --------------------------------------------------------------------------------------                        8.9    9.09  )-----------( 29       ( 15 Dec 2024 18:25 )             26.5   12-15    138  |  103  |  60[H]  ----------------------------<  294[H]  4.5   |  18[L]  |  1.25    Ca    7.9[L]      15 Dec 2024 18:25  Phos  3.9     12-15  Mg     2.3     12-15    TPro  3.8[L]  /  Alb  3.3  /  TBili  16.7[H]  /  DBili  x   /  AST  44[H]  /  ALT  50[H]  /  AlkPhos  146[H]  12-15  ABG - ( 15 Dec 2024 12:12 )  pH, Arterial: 7.43  pH, Blood: x     /  pCO2: 31    /  pO2: 174   / HCO3: 21    / Base Excess: -3.1  /  SaO2: 99.4            Urinalysis Basic - ( 15 Dec 2024 18:25 )    Color: x / Appearance: x / SG: x / pH: x  Gluc: 294 mg/dL / Ketone: x  / Bili: x / Urobili: x   Blood: x / Protein: x / Nitrite: x   Leuk Esterase: x / RBC: x / WBC x   Sq Epi: x / Non Sq Epi: x / Bacteria: x    PT/INR - ( 15 Dec 2024 06:03 )   PT: 25.8 sec;   INR: 2.28 ratio         PTT - ( 15 Dec 2024 06:03 )  PTT:55.8 sec  --------------------------------------------------------------------------------------

## 2024-12-16 NOTE — ADVANCED PRACTICE NURSE CONSULT - ASSESSMENT
Request for wound care evaluation of the Upper back for suspected DTI  received from nursing.  Patient Ramón Davison encountered on SICU found lying supine on a low air loss pressure redistribution support surface style bed with Sharon Morgan (wound PT) and son at the bedside.  Patient is sedated and intubated at this time.  Son at the bedside and aware of the wound/ostomy team and left for wound/ostomy RN to make their assessment.  Re- Introduced self and the reason for the visit.  He was seen with COCN  and clinical RN.  Mr. Davison is completely dependent for his care at this time and required assist of 2 person to turn and position and for wound RN to evaluate the area of injury.  On assessment : RN able to visualize an area of persistent nonblanchable purple discoloration to right upper back  measuring approx. 4cm x 2cm.  Patient noted with deep purple maroon/ blood blistered skin discoloration to bilateral buttocks and sacrum 4 cm L x  2 cm W.   Bilateral buttock / Sacrum the entire region appears to be consistent with evolving deep tissue injury pressure injury that was present at admission (wound care following).  Patient has Castro catheter indwelling for retention/ critically ill monitoring.  Patient's immobility, inactivity, incontinence of bowel in addition to probably poor nutritional status all contribute to his risk of pressure injury development and hinder healing. Principles of pressure injury prevention including but not limited to turning and positioning reviewed with patient.  Patient turned and positioned to improve blood flow and to reduce pressure.  B/L lower extremities elevated on the pillows with heels off the mattress.  Left patient on his left lateral lying position with gel cushion used on the back for positioning  for comfort with call bell within reach.  Safety maintained.  Clinical RN at the bedside during assessment and turning and positioning.  Remains available as needed for further evaluation.

## 2024-12-16 NOTE — PROGRESS NOTE ADULT - SUBJECTIVE AND OBJECTIVE BOX
INFECTIOUS DISEASES FOLLOW UP-- Renée Carrero  [phone]    This is a follow up note for this  74yMale with  Status post liver transplantation        ROS:  CONSTITUTIONAL:  No fever, good appetite  CARDIOVASCULAR:  No chest pain or palpitations  RESPIRATORY:  No dyspnea  GASTROINTESTINAL:  No nausea, vomiting, diarrhea, or abdominal pain  GENITOURINARY:  No dysuria  NEUROLOGIC:  No headache,     Allergies    No Known Allergies    Intolerances        ANTIBIOTICS/RELEVANT:  antimicrobials  caspofungin IVPB      meropenem  IVPB 1000 milliGRAM(s) IV Intermittent every 8 hours    immunologic:    OTHER:  albuterol/ipratropium for Nebulization 3 milliLiter(s) Nebulizer every 6 hours  buDESOnide    Inhalation Suspension 0.5 milliGRAM(s) Inhalation every 12 hours  chlorhexidine 0.12% Liquid 15 milliLiter(s) Oral Mucosa every 12 hours  chlorhexidine 2% Cloths 1 Application(s) Topical <User Schedule>  insulin regular Infusion 3 Unit(s)/Hr IV Continuous <Continuous>  methylPREDNISolone sodium succinate Injectable 16 milliGRAM(s) IV Push <User Schedule>  nystatin Powder 1 Application(s) Topical every 12 hours  pantoprazole  Injectable 40 milliGRAM(s) IV Push daily  phytonadione  IVPB 10 milliGRAM(s) IV Intermittent daily  sodium chloride 3%  Inhalation 4 milliLiter(s) Inhalation every 6 hours      Objective:  Vital Signs Last 24 Hrs  T(C): 37 (16 Dec 2024 15:00), Max: 37 (16 Dec 2024 15:00)  T(F): 98.6 (16 Dec 2024 15:00), Max: 98.6 (16 Dec 2024 15:00)  HR: 87 (16 Dec 2024 17:25) (87 - 109)  BP: 142/65 (16 Dec 2024 17:00) (142/65 - 166/77)  BP(mean): 93 (16 Dec 2024 17:00) (93 - 110)  RR: 14 (16 Dec 2024 17:00) (14 - 31)  SpO2: 99% (16 Dec 2024 17:25) (97% - 100%)    Parameters below as of 16 Dec 2024 17:25  Patient On (Oxygen Delivery Method): ventilator        PHYSICAL EXAM:  Constitutional:no acute distress  Eyes:DUSTIN, EOMI  Ear/Nose/Throat: no oral lesions, 	  Respiratory: clear BL  Cardiovascular: S1S2  Gastrointestinal:soft, (+) BS, no tenderness  Extremities:no e/e/c  No Lymphadenopathy  IV sites not inflammed.    LABS:                        9.1    12.93 )-----------( 28       ( 16 Dec 2024 12:10 )             27.1     12-16    139  |  104  |  82[H]  ----------------------------<  131[H]  4.0   |  18[L]  |  1.76[H]    Ca    7.2[L]      16 Dec 2024 12:10  Phos  4.2     12-16  Mg     2.3     12-16    TPro  3.9[L]  /  Alb  3.5  /  TBili  16.6[H]  /  DBili  x   /  AST  76[H]  /  ALT  62[H]  /  AlkPhos  186[H]  12-16    PT/INR - ( 16 Dec 2024 06:09 )   PT: 22.1 sec;   INR: 1.93 ratio         PTT - ( 16 Dec 2024 06:09 )  PTT:49.8 sec  Urinalysis Basic - ( 16 Dec 2024 12:10 )    Color: x / Appearance: x / SG: x / pH: x  Gluc: 131 mg/dL / Ketone: x  / Bili: x / Urobili: x   Blood: x / Protein: x / Nitrite: x   Leuk Esterase: x / RBC: x / WBC x   Sq Epi: x / Non Sq Epi: x / Bacteria: x        MICROBIOLOGY:            RECENT CULTURES:  12-16 @ 08:36  Combi-Cath  --  --  --  --  --  12-14 @ 03:17  Combi-Cath  --  --  --    Commensal charity consistent with body site  --  12-12 @ 18:00  .Other  --  --  --    Culture is being performed. Fungal cultures are held for 4 weeks.  --      RADIOLOGY & ADDITIONAL STUDIES:    < from: US Testicles (12.15.24 @ 10:47) >    IMPRESSION:    Marked scrotal wall edema, unchanged. There is no fluid collection to   suggest an abscess.    < end of copied text >   INFECTIOUS DISEASES FOLLOW UP-- Renéesharif Carrero  [phone]    This is a follow up note for this  74yMale with  Status post liver transplantation        ROS:  CONSTITUTIONAL: intubated, sedated      Allergies    No Known Allergies    Intolerances        ANTIBIOTICS/RELEVANT:  antimicrobials  caspofungin IVPB      meropenem  IVPB 1000 milliGRAM(s) IV Intermittent every 8 hours    immunologic:    OTHER:  albuterol/ipratropium for Nebulization 3 milliLiter(s) Nebulizer every 6 hours  buDESOnide    Inhalation Suspension 0.5 milliGRAM(s) Inhalation every 12 hours  chlorhexidine 0.12% Liquid 15 milliLiter(s) Oral Mucosa every 12 hours  chlorhexidine 2% Cloths 1 Application(s) Topical <User Schedule>  insulin regular Infusion 3 Unit(s)/Hr IV Continuous <Continuous>  methylPREDNISolone sodium succinate Injectable 16 milliGRAM(s) IV Push <User Schedule>  nystatin Powder 1 Application(s) Topical every 12 hours  pantoprazole  Injectable 40 milliGRAM(s) IV Push daily  phytonadione  IVPB 10 milliGRAM(s) IV Intermittent daily  sodium chloride 3%  Inhalation 4 milliLiter(s) Inhalation every 6 hours      Objective:  Vital Signs Last 24 Hrs  T(C): 37 (16 Dec 2024 15:00), Max: 37 (16 Dec 2024 15:00)  T(F): 98.6 (16 Dec 2024 15:00), Max: 98.6 (16 Dec 2024 15:00)  HR: 87 (16 Dec 2024 17:25) (87 - 109)  BP: 142/65 (16 Dec 2024 17:00) (142/65 - 166/77)  BP(mean): 93 (16 Dec 2024 17:00) (93 - 110)  RR: 14 (16 Dec 2024 17:00) (14 - 31)  SpO2: 99% (16 Dec 2024 17:25) (97% - 100%)    Parameters below as of 16 Dec 2024 17:25  Patient On (Oxygen Delivery Method): ventilator        PHYSICAL EXAM:  Constitutional:no acute distress  Eyes:DUSTIN,   Ear/Nose/Throat: no oral lesions, et tube  RIJ HD catheter	  Respiratory: clear BL  Cardiovascular: S1S2  Gastrointestinal: OLTx, wound, vac, ostomy  Extremities:no e/e/c  No Lymphadenopathy  IV sites not inflammed.    LABS:                        9.1    12.93 )-----------( 28       ( 16 Dec 2024 12:10 )             27.1     12-16    139  |  104  |  82[H]  ----------------------------<  131[H]  4.0   |  18[L]  |  1.76[H]    Ca    7.2[L]      16 Dec 2024 12:10  Phos  4.2     12-16  Mg     2.3     12-16    TPro  3.9[L]  /  Alb  3.5  /  TBili  16.6[H]  /  DBili  x   /  AST  76[H]  /  ALT  62[H]  /  AlkPhos  186[H]  12-16    PT/INR - ( 16 Dec 2024 06:09 )   PT: 22.1 sec;   INR: 1.93 ratio         PTT - ( 16 Dec 2024 06:09 )  PTT:49.8 sec  Urinalysis Basic - ( 16 Dec 2024 12:10 )    Color: x / Appearance: x / SG: x / pH: x  Gluc: 131 mg/dL / Ketone: x  / Bili: x / Urobili: x   Blood: x / Protein: x / Nitrite: x   Leuk Esterase: x / RBC: x / WBC x   Sq Epi: x / Non Sq Epi: x / Bacteria: x        MICROBIOLOGY:            RECENT CULTURES:  12-16 @ 08:36  Combi-Cath  --  --  --  --  --  12-14 @ 03:17  Combi-Cath  --  --  --    Commensal charity consistent with body site  --  12-12 @ 18:00  .Other  --  --  --    Culture is being performed. Fungal cultures are held for 4 weeks.  --      RADIOLOGY & ADDITIONAL STUDIES:    < from: US Testicles (12.15.24 @ 10:47) >    IMPRESSION:    Marked scrotal wall edema, unchanged. There is no fluid collection to   suggest an abscess.    < end of copied text >

## 2024-12-17 NOTE — PROGRESS NOTE ADULT - ASSESSMENT
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     s/p OLT 11/15 with complicated post op course    [] Septic shock/Cardiogenic shock  [] s/p Colectomy 12/7 POD#9  [] RTOR for closure and Ileostomy creation   [] IAPB 12/7- removed 12/10: CTICU on board  - E coli Bacteremia (12/6) - on jeniffer/caspo -> switched to  cefepime/fluc. Completed 7days of emperic flagyl.  Received Tobra x 1 12/6 .   - c/w cefepime due to leukocytosis.  - Fluc 400mg   - Neutropenia: received Neupogen 480mcg x2  - MORAIMA: CRRT started 12/7, stopped 12/15  - AMS; CT Head neg  - scrotal edema: repeat scrotal us; neg for abscess  - Hold diuretics   - f/u repeat BCx, urine Cx, and UA +nitrite and leukocyte esterase   - On meropenem and caspofungin as per ID  - Vitamin K x 3 days 12/16-12/19  - Plan for tracheotomy today    [] s/p OLT POD #32  - trend LFT's  - Diet: increase TFs as needed  - Pain management: avoid narcotics  - Strict I&Os, nichols, wound vac placed 12/10   - US: L brachial DVT   - wound vac exchange for ileostomy (12/13)  - HIT panel neg (12/14)    [ ] Immunosuppression  - Tacrolimus stopped 11/18 in setting of AMS/Seizure, Started Cyclo 12/17  - Cyclo 25BID, MMF held, Solumedrol 16mg daily  - PPx: Gancyclovir (HELD), bactrim (HELD) in setting of thrombocytopenia    [] lleus   -@ home on Linzess  - imaging with distended colon (likely opioid induced)  - s/p Neostigmine x 2  - s/p Relistor, last dose 12/1  - s/p colectomy with end ileostomy  (see above)  - Daily AXR     [] CMV viremia  - d/c gancylovir due to thrombocytopenia  - CMV PCR (12/11 and 12/16): neg    [ ] AMS  - Reintubated 11/20 Aspiration/hypoxia, extubated 11/24->[id number]/24--> extubated 11/26 -> [id number]/7  - EEG 11/30 negative, Neurology following  - BH following   - off tacro  - on keppra    [ ] HTN/ pAFib  - On Amiodarone, off dobutamine  - metoprolol held (hypotensive)   - Eliquis 5mg bid - held 12/6.   - Dr. 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     s/p OLT 11/15 with complicated post op course    [] Septic shock/Cardiogenic shock  [] s/p Colectomy 12/7 POD#9  [] RTOR for closure and Ileostomy creation   [] IAPB 12/7- removed 12/10: CTICU on board  - E coli Bacteremia (12/6) - on jeniffer/caspo -> switched to  cefepime/fluc. Completed 7days of emperic flagyl.  Received Tobra x 1 12/6 .   - c/w cefepime due to leukocytosis.  - Fluc 400mg   - Neutropenia: received Neupogen 480mcg x2  - MORAIMA: CRRT started 12/7, stopped 12/15  - AMS; CT Head neg  - scrotal edema: repeat scrotal us; neg for abscess  - Hold diuretics   - f/u urine Cx  - BCx (12/16): no growth to date and UA (12/16) +nitrite and leukocyte esterase   - On meropenem and caspofungin as per ID  - Vitamin K x 3 days 12/16-12/19  - Plan for tracheotomy today    [] s/p OLT POD #32  - trend LFT's  - Diet: increase TFs as needed  - Pain management: avoid narcotics  - Strict I&Os, nichols, wound vac placed 12/10   - US: L brachial DVT   - wound vac exchange for ileostomy (12/13)  - HIT panel neg (12/14)    [ ] Immunosuppression  - Tacrolimus stopped 11/18 in setting of AMS/Seizure, Started Cyclo 12/17  - Cyclo 25BID, MMF held, Solumedrol 16mg daily  - PPx: Gancyclovir (HELD), bactrim (HELD) in setting of thrombocytopenia    [] lleus   -@ home on Linzess  - imaging with distended colon (likely opioid induced)  - s/p Neostigmine x 2  - s/p Relistor, last dose 12/1  - s/p colectomy with end ileostomy  (see above)  - Daily AXR     [] CMV viremia  - d/c gancylovir due to thrombocytopenia  - CMV PCR (12/11 and 12/16): neg    [ ] AMS  - Reintubated 11/20 Aspiration/hypoxia, extubated 11/24->[id number]/24--> extubated 11/26 -> [id number]/7  - EEG 11/30 negative, Neurology following  - BH following   - off tacro  - on keppra    [ ] HTN/ pAFib  - Off Amiodarone, off dobutamine  - metoprolol held (hypotensive)   - Eliquis 5mg bid - held 12/6.   - Dr. Quintanilla following    [ ] DM  - ISS     []Scrotal abscess   - US (12/12): 2.1 x0.9 x 3.2 cm focal, right testicle- possible abscess (12/12)  - Urology c/s recs nothing to do, repeat ultrasound   - 12/15 CT CAP no acute changes

## 2024-12-17 NOTE — PROGRESS NOTE ADULT - SUBJECTIVE AND OBJECTIVE BOX
[facility] DIVISION OF KIDNEY DISEASES AND HYPERTENSION -- FOLLOW UP NOTE  --------------------------------------------------------------------------------  Chief Complaint: s/p OLT 11/15/24 with oliguric MORAIMA in setting of cardiogenic/septic shock.    24 hour events/subjective: Pt. seen and examined at bedside earlier today with son and wife at bedside. Pt. remains intubated. However pt. no longer on sedation.        PAST HISTORY  --------------------------------------------------------------------------------  No significant changes to PMH, PSH, FHx, SHx, unless otherwise noted    ALLERGIES & MEDICATIONS  --------------------------------------------------------------------------------  Allergies    No Known Allergies    Intolerances      Standing Inpatient Medications  albuterol/ipratropium for Nebulization 3 milliLiter(s) Nebulizer every 6 hours  buDESOnide    Inhalation Suspension 0.5 milliGRAM(s) Inhalation every 12 hours  caspofungin IVPB 50 milliGRAM(s) IV Intermittent every 24 hours  caspofungin IVPB      chlorhexidine 0.12% Liquid 15 milliLiter(s) Oral Mucosa every 12 hours  chlorhexidine 2% Cloths 1 Application(s) Topical <User Schedule>  cycloSPORINE  , modified (GENGRAF) Solution 25 milliGRAM(s) Oral once  insulin regular Infusion 3 Unit(s)/Hr IV Continuous <Continuous>  meropenem  IVPB 1000 milliGRAM(s) IV Intermittent every 8 hours  methylPREDNISolone sodium succinate Injectable 16 milliGRAM(s) IV Push <User Schedule>  metoprolol tartrate 50 milliGRAM(s) Enteral Tube every 8 hours  nystatin Powder 1 Application(s) Topical every 12 hours  pantoprazole  Injectable 40 milliGRAM(s) IV Push daily  phytonadione  IVPB 10 milliGRAM(s) IV Intermittent daily    PRN Inpatient Medications      REVIEW OF SYSTEMS  --------------------------------------------------------------------------------  Unable to obtain ROS due to current clinical status.     VITALS/PHYSICAL EXAM  --------------------------------------------------------------------------------  T(C): 37.1 (12-17-24 @ 11:00), Max: 37.3 (12-17-24 @ 03:00)  HR: 90 (12-17-24 @ 12:00) (77 - 102)  BP: 146/66 (12-17-24 @ 12:00) (110/63 - 166/77)  RR: 16 (12-17-24 @ 12:00) (14 - 30)  SpO2: 97% (12-17-24 @ 12:00) (97% - 100%)  Wt(kg): --        12-16-24 @ 07:01  -  12-17-24 @ 07:00  --------------------------------------------------------  IN: 2894.5 mL / OUT: 4260 mL / NET: -1365.5 mL    12-17-24 @ 07:01  -  12-17-24 @ 12:58  --------------------------------------------------------  IN: 175 mL / OUT: 600 mL / NET: -425 mL    Physical Exam:  Gen: critically ill, intubated  Pulm: intubated.   CV: tachycardic, S1S2+  Abd: + distended. +incisions. +NGT in place. +ostomy   : +nichols catheter   MSK: 1+ pitting edema above knee including scrotum  Skin: Warm    LABS/STUDIES  --------------------------------------------------------------------------------              9.3    14.70 >-----------<  36       [12-17-24 @ 00:39]              28.1     138  |  104  |  95  ----------------------------<  210      [12-17-24 @ 00:40]  4.2   |  18  |  1.99        Ca     7.4     [12-17-24 @ 00:40]      Mg     2.2     [12-17-24 @ 00:40]      Phos  4.9     [12-17-24 @ 00:40]    TPro  3.5  /  Alb  3.0  /  TBili  16.7  /  DBili  x   /  AST  77  /  ALT  72  /  AlkPhos  220  [12-17-24 @ 00:40]    PT/INR: PT 20.4 , INR 1.80       [12-17-24 @ 06:19]  PTT: 53.0       [12-17-24 @ 06:19]      Creatinine Trend:  SCr 1.99 [12-17 @ 00:40]  SCr 1.84 [12-16 @ 18:09]  SCr 1.76 [12-16 @ 12:10]  SCr 1.59 [12-16 @ 06:09]  SCr 1.45 [12-16 @ 00:45]                Vitamin D (25OH) <6.0      [11-21-24 @ 08:32]  TSH 0.68      [12-12-24 @ 12:27]  Lipid: chol 114, , HDL 47, LDL --      [04-24-24 @ 06:48]

## 2024-12-17 NOTE — PROGRESS NOTE ADULT - ASSESSMENT
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 Post-op course c/b worsening mental status and acute hypoxic respiratory failure requiring multiple intubations/extubations, currently extubated (as of 11/26/24) and colonic ileus s/p several rounds of Relistor and Neostigmine with NGT and rectal tube currently in place now with septic shock of unclear etiology. Transplant nephrology consulted for metabolic acidosis and MORAIMA.    1. s/p OLT 11/15/24 with oliguric MORAIMA in setting of septic shock.  - Likely hemodynamically mediated in setting of cardiogenic and septic shock on multiple vasopressors and IABP.   - SCr on admission was 0.48 11/15/24  - Pt. is non-oliguric, UOP today is 1.97L in 24 hours s/p bumex and metolazone.    - Urinalysis trace ketones, protein 30, 17 casts.   - CT AP non-con: Bilateral renal cysts including cysts with peripheral calcification in the left kidney.  - Pt. initiated on CRRT 12/7/24- 12/15/24 at 1AM stopped.  - Electrolyte, labs, and volume status are acceptable. No plan for CRRT today.     2. Metabolic Acidosis   - AGMA in setting of septic shock, starvation ketosis, lactic acidosis and MORAIMA.  - BHB was 2.3, not repeated.     If you have any questions, please feel free to contact me:  Magaly Tello MD PGY-4  Nephrology Fellow  [employer] Teams (Preferred)/ Pager [phone]   (After 5pm or on weekends please page the on-call fellow

## 2024-12-17 NOTE — PROGRESS NOTE ADULT - ASSESSMENT
74 year-old with known coronary artery disease as above presents for liver transplant.  Seen to have chest pain post transplant.  It was atypical of angina and has resolved.  Troponin remained negative.    PAF - currently sinus rhythm     Patient was septic on Friday, 12/6, and at that time, he was seen to be hyperdynamic with TODD and severe LVOT gradient.  Post ex-lap, patient seen to have newly reduced LVEF.  IABP placed. Inotrope and pressor support. IABP removed 12/10 without significant drop in cardiac output.  Weaned off both inotrope and pressors. Now restarted on beta-blocker.  Keep MAP >65 mmHg. Monitor output via [place].    Heart Failure follow-up appreciated.

## 2024-12-17 NOTE — PROGRESS NOTE ADULT - SUBJECTIVE AND OBJECTIVE BOX
Transplant Surgery - Multidisciplinary Rounds  --------------------------------------------------------------  OLT   11/15/2024        POD#32  Colectomy 12/7/2024   POD#10  IABP 12/7 removed 12/10    Present:   Patient seen and examined with multidisciplinary Transplant team including Surgeon: Dr. Vora, Dr. Sorto, JAZZ Fisher/Ascencion, Hepatologist: Dr. Malagon and ICU team in AM rounds.   Disciplines not in attendance will be notified of the plan.     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     s/p OLT 11/15 with post op course c/b:  Hypoxia: Reintubated 11/20, extubated 11/24->reintubated 11/24, extubated 11/26, reintubated 12/7  Ileus   Fever  A fib  AMS/Seizure   L brachial DVT  Neutropenia  E coli Bacteremia (12/6)  s/p Coloctomy 12/7.   IABP 12/7, removed 12/10    Interval/Overnight Events:   - afebrile, off pressors, off amio  - Stopped CRRT since 12/15, poor response to lasix, better with bumex    Immunosuppression:  -Cyclo 25BID, MMF held, Solu 16  -ongoing monitoring for signs of rejection    Potential Discharge date: Pending clinical course  Education: intubated  Plan of care: See Below  MEDICATIONS  (STANDING):  albuterol/ipratropium for Nebulization 3 milliLiter(s) Nebulizer every 6 hours  buDESOnide    Inhalation Suspension 0.5 milliGRAM(s) Inhalation every 12 hours  caspofungin IVPB      caspofungin IVPB 50 milliGRAM(s) IV Intermittent every 24 hours  chlorhexidine 0.12% Liquid 15 milliLiter(s) Oral Mucosa every 12 hours  chlorhexidine 2% Cloths 1 Application(s) Topical <User Schedule>  cycloSPORINE  , modified (GENGRAF) Solution 25 milliGRAM(s) Oral once  insulin regular Infusion 3 Unit(s)/Hr (3 mL/Hr) IV Continuous <Continuous>  meropenem  IVPB 1000 milliGRAM(s) IV Intermittent every 8 hours  methylPREDNISolone sodium succinate Injectable 16 milliGRAM(s) IV Push <User Schedule>  metoprolol tartrate 50 milliGRAM(s) Enteral Tube every 8 hours  nystatin Powder 1 Application(s) Topical every 12 hours  pantoprazole  Injectable 40 milliGRAM(s) IV Push daily  phytonadione  IVPB 10 milliGRAM(s) IV Intermittent daily    MEDICATIONS  (PRN):    PAST MEDICAL & SURGICAL HISTORY:  Diabetes    Transaminitis    Paroxysmal atrial fibrillation    Depression    BPH (benign prostatic hyperplasia)    Hypertension    Chronic atrial fibrillation    Coronary artery disease    Hepatocellular carcinoma    DM (diabetes mellitus)    HTN (hypertension)    Paroxysmal atrial fibrillation    Cirrhosis    HCC (hepatocellular carcinoma)    History of BPH    History of laparoscopic cholecystectomy    History of lumbar laminectomy    H/O prior ablation treatment    H/O percutaneous left heart catheterization    Vital Signs Last 24 Hrs  T(C): 37.1 (17 Dec 2024 13:15), Max: 37.3 (17 Dec 2024 03:00)  T(F): 98.8 (17 Dec 2024 13:15), Max: 99.1 (17 Dec 2024 03:00)  HR: 88 (17 Dec 2024 13:15) (77 - 102)  BP: 126/70 (17 Dec 2024 13:15) (110/63 - 166/77)  BP(mean): 86 (17 Dec 2024 13:15) (79 - 110)  RR: 20 (17 Dec 2024 13:15) (13 - 30)  SpO2: 98% (17 Dec 2024 13:15) (97% - 100%)    Parameters below as of 17 Dec 2024 11:52  Patient On (Oxygen Delivery Method): ventilator    I&O's Summary    16 Dec 2024 07:01  -  17 Dec 2024 07:00  --------------------------------------------------------  IN: 2894.5 mL / OUT: 4260 mL / NET: -1365.5 mL    17 Dec 2024 07:01  -  17 Dec 2024 13:53  --------------------------------------------------------  IN: 176 mL / OUT: 700 mL / NET: -524 mL                          9.3    14.70 )-----------( 36       ( 17 Dec 2024 00:39 )             28.1     12-17    138  |  104  |  95[H]  ----------------------------<  210[H]  4.2   |  18[L]  |  1.99[H]    Ca    7.4[L]      17 Dec 2024 00:40  Phos  4.9     12-17  Mg     2.2     12-17    TPro  3.5[L]  /  Alb  3.0[L]  /  TBili  16.7[H]  /  DBili  x   /  AST  77[H]  /  ALT  72[H]  /  AlkPhos  220[H]  12-17    Culture - Bronchial (collected 12-16-24 @ 08:36)  Source: Combi-Cath  Gram Stain (12-16-24 @ 16:27):    No polymorphonuclear leukocytes seen per low power field    No Squamous epithelial cells seen per low power field    No organisms seen per oil power field  Preliminary Report (12-17-24 @ 07:26):    Commensal charity consistent with body site    Culture - Blood (collected 12-16-24 @ 07:45)  Source: .Blood BLOOD  Preliminary Report (12-17-24 @ 12:01):    No growth at 24 hours    Culture - Bronchial (collected 12-14-24 @ 03:17)  Source: Combi-Cath  Gram Stain (12-14-24 @ 14:01):    Numerous polymorphonuclear leukocytes per low power field    No Squamous epithelial cells per low power field    No organisms seen per oil power field  Final Report (12-15-24 @ 16:14):    Commensal charity consistent with body site    Culture - Fungal, Other (collected 12-12-24 @ 18:00)  Source: .Other  Preliminary Report (12-14-24 @ 23:02):    Culture is being performed. Fungal cultures are held for 4 weeks.    ROS: Unable to assess patient is intubated, sedated    PHYSICAL EXAM:   Constitutional: intubated, sedated   Eyes:  PERRLA  ENMT: nc/at, no thrush   Neck: supple   Respiratory: CTA B/L  Cardiovascular: RRR  Gastrointestinal: incision clean/dry/intact + Ostomy pink   Genitourinary: +scrotal edema, Castro in place  Extremities: SCD's in place and working bilaterally, + LE edema  Neurological: intubated, sedated  Skin: no rashes, ulcerations, lesions

## 2024-12-17 NOTE — BRIEF OPERATIVE NOTE - NSICDXBRIEFPROCEDURE_GEN_ALL_CORE_FT
PROCEDURES:  Percutaneous needle tracheostomy 17-Dec-2024 19:07:13  Bonnie Donahue  Bronchoscopy 17-Dec-2024 19:08:04  Bonnie Donahue  
PROCEDURES:  Insertion, intra-aortic balloon pump, percutaneous 07-Dec-2024 08:09:10  Les Dugan  
PROCEDURES:  Total abdominal colectomy with ileostomy 07-Dec-2024 08:47:45  Bere Sorto  Temporary closure of abdominal cavity 07-Dec-2024 08:48:48  Bere Sorto  
PROCEDURES:  Transplant, liver, into recipient,  donor 15-Nov-2024 18:04:17  Marisa Arce  
PROCEDURES:  Laparotomy, exploratory 09-Dec-2024 15:33:46  Bere Sorto  Ileostomy 09-Dec-2024 15:33:52  Bere Sorto  Closure of fascia of abdomen 09-Dec-2024 15:34:03  Bere Sorto  Insertion, feeding tube, Dobhoff 09-Dec-2024 15:42:07  Bere Sorto

## 2024-12-17 NOTE — PROGRESS NOTE ADULT - SUBJECTIVE AND OBJECTIVE BOX
24 HOUR EVENTS:  - Dental consult placed: NTD  - pancultured  - transition to jeniffer and caspo  - femoral A line removed    NEURO  Exam: follows commands    RESPIRATORY  RR: 21 (12-17-24 @ 04:00) (14 - 30)  SpO2: 97% (12-17-24 @ 04:00) (97% - 100%)  Exam: unlabored  Mechanical Ventilation: Mode: AC/ CMV (Assist Control/ Continuous Mandatory Ventilation), RR (machine): 14, RR (patient): 23, TV (machine): 500, FiO2: 30, PEEP: 5, ITime: 1, MAP: 8, PIP: 12  ABG - ( 16 Dec 2024 12:00 )  pH: 7.41  /  pCO2: 31    /  pO2: 159   / HCO3: 20    / Base Excess: -4.3  /  SaO2: 99.2          Meds: albuterol/ipratropium for Nebulization 3 milliLiter(s) Nebulizer every 6 hours  buDESOnide    Inhalation Suspension 0.5 milliGRAM(s) Inhalation every 12 hours      CARDIOVASCULAR  HR: 93 (12-17-24 @ 04:00) (80 - 109)  BP: 110/63 (12-17-24 @ 04:00) (110/63 - 166/77)  BP(mean): 79 (12-17-24 @ 04:00) (79 - 110)  ABP: 134/57 (12-16-24 @ 13:00) (129/64 - 163/71)  ABP(mean): 87 (12-16-24 @ 13:00) (85 - 108)  VBG - ( 17 Dec 2024 00:26 )  pH: 7.33  /  pCO2: 37    /  pO2: 51    / HCO3: 20    / Base Excess: -5.9  /  SaO2: 82.8   Lactate: 2.3        Exam:   Cardiac Rhythm:   Perfusion     [x ]Adequate   [ ]Inadequate  Mentation   [ x]Normal       [ ]Reduced  Extremities  [ x]Warm         [ ]Cool  Volume Status [ ]Hypervolemic [x ]Euvolemic [ ]Hypovolemic  Meds: metoprolol tartrate 50 milliGRAM(s) Enteral Tube every 8 hours      GI/NUTRITION  Exam: soft, NT/ND  Diet: tube feeds  Meds: pantoprazole  Injectable 40 milliGRAM(s) IV Push daily      GENITOURINARY  I&O's Detail    12-15 @ 07:01  -  12-16 @ 07:00  --------------------------------------------------------  IN:    Albumin 25%  -  50 mL: 100 mL    Albumin 5%  - 250 mL: 300 mL    Amiodarone: 99.6 mL    Amiodarone: 99.6 mL    DOBUTamine: 39.2 mL    Enteral Tube Flush: 90 mL    Insulin: 39 mL    IV PiggyBack: 350 mL    Nepro with Carb Steady: 970 mL    Platelets - Single Donor: 225 mL  Total IN: 2312.4 mL    OUT:    Bulb (mL): 1650 mL    Bulb (mL): 500 mL    Ileostomy (mL): 475 mL    Indwelling Catheter - Urethral (mL): 730 mL    Nasogastric/Oral tube (mL): 0 mL    VAC (Vacuum Assisted Closure) System (mL): 0 mL  Total OUT: 3355 mL    Total NET: -1042.6 mL      12-16 @ 07:01 - 12-17 @ 04:47  --------------------------------------------------------  IN:    Albumin 5%  - 250 mL: 500 mL    Amiodarone: 83 mL    Enteral Tube Flush: 50 mL    Insulin: 59.5 mL    IV PiggyBack: 100 mL    IV PiggyBack: 700 mL    Nepro with Carb Steady: 1175 mL  Total IN: 2667.5 mL    OUT:    Bulb (mL): 325 mL    Bulb (mL): 725 mL    Ileostomy (mL): 100 mL    Indwelling Catheter - Urethral (mL): 1810 mL  Total OUT: 2960 mL    Total NET: -292.5 mL          12-17    138  |  104  |  95[H]  ----------------------------<  210[H]  4.2   |  18[L]  |  1.99[H]    Ca    7.4[L]      17 Dec 2024 00:40  Phos  4.9     12-17  Mg     2.2     12-17    TPro  3.5[L]  /  Alb  3.0[L]  /  TBili  16.7[H]  /  DBili  x   /  AST  77[H]  /  ALT  72[H]  /  AlkPhos  220[H]  12-17    Meds: phytonadione  IVPB 10 milliGRAM(s) IV Intermittent daily      HEMATOLOGIC  Meds:                         9.3    14.70 )-----------( 36       ( 17 Dec 2024 00:39 )             28.1     PT/INR - ( 16 Dec 2024 06:09 )   PT: 22.1 sec;   INR: 1.93 ratio         PTT - ( 16 Dec 2024 06:09 )  PTT:49.8 sec    INFECTIOUS DISEASES  T(C): 37.3 (12-17-24 @ 03:00), Max: 37.3 (12-17-24 @ 03:00)  Wt(kg): --  WBC Count: 14.70 K/uL (12-17 @ 00:39)  WBC Count: 13.76 K/uL (12-16 @ 18:09)  WBC Count: 12.93 K/uL (12-16 @ 12:10)  WBC Count: 14.58 K/uL (12-16 @ 06:09)    Recent Cultures:  Specimen Source: [employer], 12-16 @ 08:36; Results --; Gram Stain:   No polymorphonuclear leukocytes seen per low power field  No Squamous epithelial cells seen per low power field  No organisms seen per oil power field; Organism: --  Specimen Source: UMicIt, 12-14 @ 03:17; Results   Commensal charity consistent with body site; Gram Stain:   Numerous polymorphonuclear leukocytes per low power field  No Squamous epithelial cells per low power field  No organisms seen per oil power field; Organism: --  Specimen Source: .Other, 12-12 @ 18:00; Results   Culture is being performed. Fungal cultures are held for 4 weeks.; Gram Stain: --; Organism: --    Meds: caspofungin IVPB      caspofungin IVPB 50 milliGRAM(s) IV Intermittent every 24 hours  meropenem  IVPB 1000 milliGRAM(s) IV Intermittent every 8 hours      ENDOCRINE  Capillary Blood Glucose    Meds: insulin regular Infusion 3 Unit(s)/Hr IV Continuous <Continuous>  methylPREDNISolone sodium succinate Injectable 16 milliGRAM(s) IV Push <User Schedule>      ACCESS DEVICES:  [ ] Peripheral IV  [ ] Central Venous Line		[ ] R	[ ] L	[ ] IJ	[ ] Fem	[ ] SC	Placed:   [ ] Arterial Line			[ ] R	[ ] L	[ ] Fem	[ ] Rad	[ ] Ax	Placed:   [ ] PICC:					[ ] Mediport  [ ] Urinary Catheter, Date Placed:   [ ] Necessity of urinary, arterial, and venous catheters discussed    OTHER MEDICATIONS:  chlorhexidine 0.12% Liquid 15 milliLiter(s) Oral Mucosa every 12 hours  chlorhexidine 2% Cloths 1 Application(s) Topical <User Schedule>  nystatin Powder 1 Application(s) Topical every 12 hours      IMAGING:

## 2024-12-17 NOTE — PROGRESS NOTE ADULT - SUBJECTIVE AND OBJECTIVE BOX
HPI:  Patient seen and examined at bedside in SICU.  Mild lactate elevation, but off pressor support.    Review Of Systems:     Unable to assess       Medications:  albuterol/ipratropium for Nebulization 3 milliLiter(s) Nebulizer every 6 hours  buDESOnide    Inhalation Suspension 0.5 milliGRAM(s) Inhalation every 12 hours  caspofungin IVPB      caspofungin IVPB 50 milliGRAM(s) IV Intermittent every 24 hours  chlorhexidine 0.12% Liquid 15 milliLiter(s) Oral Mucosa every 12 hours  chlorhexidine 2% Cloths 1 Application(s) Topical <User Schedule>  insulin lispro (ADMELOG) corrective regimen sliding scale   SubCutaneous every 4 hours  meropenem  IVPB 1000 milliGRAM(s) IV Intermittent every 8 hours  methylPREDNISolone sodium succinate Injectable 16 milliGRAM(s) IV Push <User Schedule>  metoprolol tartrate 50 milliGRAM(s) Enteral Tube every 8 hours  norepinephrine Infusion 0.05 MICROgram(s)/kG/Min IV Continuous <Continuous>  nystatin Powder 1 Application(s) Topical every 12 hours  pantoprazole  Injectable 40 milliGRAM(s) IV Push daily  phytonadione  IVPB 10 milliGRAM(s) IV Intermittent daily    PAST MEDICAL & SURGICAL HISTORY:  Diabetes      Transaminitis      Paroxysmal atrial fibrillation      Depression      BPH (benign prostatic hyperplasia)      Hypertension      Chronic atrial fibrillation      Coronary artery disease      Hepatocellular carcinoma      DM (diabetes mellitus)      HTN (hypertension)      Paroxysmal atrial fibrillation      Cirrhosis      HCC (hepatocellular carcinoma)      History of BPH      History of laparoscopic cholecystectomy      History of lumbar laminectomy      H/O prior ablation treatment      H/O percutaneous left heart catheterization        Vitals:  T(C): 36.7 (12-17-24 @ 19:00), Max: 37.3 (12-17-24 @ 03:00)  HR: 81 (12-17-24 @ 22:00) (73 - 99)  BP: 93/50 (12-17-24 @ 22:00) (66/37 - 162/65)  BP(mean): 69 (12-17-24 @ 22:00) (46 - 102)  RR: 28 (12-17-24 @ 22:00) (13 - 32)  SpO2: 100% (12-17-24 @ 22:00) (97% - 100%)  Wt(kg): --  Daily     Daily   I&O's Summary    16 Dec 2024 07:01  -  17 Dec 2024 07:00  --------------------------------------------------------  IN: 2894.5 mL / OUT: 4260 mL / NET: -1365.5 mL    17 Dec 2024 07:01  -  17 Dec 2024 22:59  --------------------------------------------------------  IN: 1299.5 mL / OUT: 2290 mL / NET: -990.5 mL        Physical Exam:  Appearance: Intubated  Eyes: PERRL, EOMI, pink conjunctiva  HENT: +ET tube  Cardiovascular: RRR, S1, S2, no murmurs, rubs, or gallops; no edema; no JVD  Respiratory: ventilator support  Gastrointestinal: soft, non-tender, non-distended with normal bowel sounds  Musculoskeletal: No clubbing; no joint deformity   Skin: Sacral wound                          9.0    16.13 )-----------( 83       ( 17 Dec 2024 16:21 )             27.2     12-17    138  |  102  |  97[H]  ----------------------------<  116[H]  4.3   |  19[L]  |  2.45[H]    Ca    7.5[L]      17 Dec 2024 16:21  Phos  5.9     12-17  Mg     2.3     12-17    TPro  3.9[L]  /  Alb  3.0[L]  /  TBili  17.3[H]  /  DBili  x   /  AST  92[H]  /  ALT  83[H]  /  AlkPhos  244[H]  12-17    PT/INR - ( 17 Dec 2024 06:19 )   PT: 20.4 sec;   INR: 1.80 ratio         PTT - ( 17 Dec 2024 06:19 )  PTT:53.0 sec        Cardiovascular Diagnostic Testing:  ECG: sinus rhythm    Echo: < from: Intra-Operative Transesophageal Echo W or WO Ultrasound Enhancing Agent (11.15.24 @ 07:46) >  --------------------------------------------------------------------------------  PRE-BYPASS FINDINGS     Left Ventricle:  Left ventricular ejection fraction is estimated at 60 to 65%. Normal left ventricularwall thickness. The left ventricular systolic function is normal There are no regional wall motion abnormalities seen.     Right Ventricle:  The right ventricular cavity is normal in size, with normal wall thickness and right ventricular systolic function is normal. Right ventricular systolic function is normal.     Left Atrium:  The left atrium is normal in size. No thrombus visualized in left atrial appendage.     Right Atrium:  The right atrium is normal in size. The right atrium is normal in size.     Interatrial Septum:  No PFO visualized with color flow doppler.     Aortic Valve:  The aortic valve appears trileaflet. There is no aortic valve stenosis. There is trace aortic regurgitation.     Mitral Valve:  There is no mitral valve stenosis. There is no mitral valve stenosis. There is trace mitral regurgitation.     Tricuspid Valve:  There is no evidence of tricuspid stenosis. There is trace tricuspid regurgitation.     Pericardium:  No pericardial effusion seen.     Post-Bypass:  S/P OLT. Under current loading conditions, hyperdynamic left ventricular systolic function, EF: 70-75% by visual estimate, no RWMA. Normal right ventricular systolic function. All other findings unchanged. Probe removed atraumatically, no blood. The patient tolerated the procedure well and without complications. Permanent recorded images are stored in the medical record.     Electronically signed by James Villareal on 11/15/2024 at 2:18:16 PM         *** Final ***    < end of copied text >      < from: TTE Limited W or WO Ultrasound Enhancing Agent (12.11.24 @ 09:28) >  _______________________________________________________________________________________     CONCLUSIONS:      1. Endocardial visualization enhanced with Definity (Ultrasound Enhancing Agent).   2. Left ventricular systolic function is normal with an ejection fraction visually estimated at 55 to 60 %.   3. Enlarged right ventricular cavity size and mildly reduced right ventricular systolic function.   4. Device lead is visualized in the right heart.    ________________________________________________________________________________________    < end of copied text >      Stress Testing:     Cath: 4/24/2024, patient had his LHC repeated with Ben Villareal MD at [facility].  Cath showed multivessel coronary artery disease, but the lesions previously noted were FFR negative.  He continues to have MIRTA 3 flow throughout.    Interpretation of Telemetry: Sinus     Imaging:

## 2024-12-17 NOTE — BRIEF OPERATIVE NOTE - FIRST ASSIST NAME
Ozzie Lopez (Fellow)
Victoria Hurtado (Physician Assistant)
Les Dugan (Resident)
Ozzie Lopez (Fellow)
Bere Sorto (Fellow)

## 2024-12-17 NOTE — PROGRESS NOTE ADULT - ASSESSMENT
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 Patient's post-op course has been c/b multiple reintubation, total colectomy w/ ileostomy, and IABP placement 2/2 cardiogenic shock with subsequent removal.     PLAN:    Neuro:  - Follows commands, off sedation  - pain control w/ oxycodone  - CT head 11/19, 11/21, 11/25, 11/29, 12/5 negative  - EEG: Mild diffuse cerebral dysfunction that is not specific in etiology. No epileptic discharges recorded. No seizures recorded.  - Holding home xanax, Abilify, Zoloft, Remeron, Lexapro     Resp:  -PRVC 14/500/5/30  -SBT daily  -c/w inhaled bronchodilator  -VBG daily    CV:  -IABP removed 12/10  -dobutamine gtt d/c'ed 12/15  -s/p amiodarone gtt  - Metoprolol 50 q8 hr  -trend lactate  -TTE 12/11 shows EF of 55-60%    GI:  -trend LFTs  -NPO w/ tube feeds  -KO tube post pyloric  -protonix QD  -high output from ALYSSA drains, continue to monitor quantity & quality    /Renal:  -off CRRT since 12/15  -Minimal response to lasix 12/15, given bumex 2mg and metolazone 10mg x1  -Monitor BUN/Cr  -I&Os, trend UOP  -elevate scrotum i/s/o edema   -Scrotal US 12/15 -> edema, no abscess    Heme:  -trend H/H  -monitor coags    ID:  -ABx transition to jeniffer, caspo (12/16-)  -monitor WBC, fever curve  -CMV PPx w/ ganciclovir (holding now iso thrombocytopenia)  -holding cellcept, cyclosporine  -f/u cultures from 12/16  -UA positive 12/16    Endo:  -monitor glucose   -SDS   -Insulin gtt 12/16    Lines:   -KO tube, remove NGT  -nichols exchanged 12/16  -RIJ (12/7)  -LIJ (12/7)  -ALYSSA x 2     Code Status: full code    Disposition: VANDANA CamposC     [id number]

## 2024-12-18 NOTE — PROGRESS NOTE ADULT - SUBJECTIVE AND OBJECTIVE BOX
HPI:  Patient seen and examined at bedside in SICU.    Review Of Systems:           Respiratory: No shortness of breath, cough, or wheezing  Cardiovascular: No chest pain or palpitations  10 point review of systems is otherwise negative except as mentioned above        Medications:  albumin human  5% IVPB 500 milliLiter(s) IV Intermittent every 12 hours PRN  albuterol/ipratropium for Nebulization 3 milliLiter(s) Nebulizer every 6 hours  buDESOnide    Inhalation Suspension 0.5 milliGRAM(s) Inhalation every 12 hours  caspofungin IVPB 50 milliGRAM(s) IV Intermittent every 24 hours  caspofungin IVPB      chlorhexidine 0.12% Liquid 15 milliLiter(s) Oral Mucosa every 12 hours  chlorhexidine 2% Cloths 1 Application(s) Topical <User Schedule>  insulin lispro (ADMELOG) corrective regimen sliding scale   SubCutaneous every 4 hours  insulin NPH human recombinant 12 Unit(s) SubCutaneous every 6 hours  meropenem  IVPB 1000 milliGRAM(s) IV Intermittent every 12 hours  methylPREDNISolone sodium succinate Injectable 16 milliGRAM(s) IV Push <User Schedule>  metoprolol tartrate 50 milliGRAM(s) Enteral Tube every 8 hours  nystatin Powder 1 Application(s) Topical every 12 hours  pantoprazole  Injectable 40 milliGRAM(s) IV Push daily    PAST MEDICAL & SURGICAL HISTORY:  Diabetes      Transaminitis      Paroxysmal atrial fibrillation      Depression      BPH (benign prostatic hyperplasia)      Hypertension      Chronic atrial fibrillation      Coronary artery disease      Hepatocellular carcinoma      DM (diabetes mellitus)      HTN (hypertension)      Paroxysmal atrial fibrillation      Cirrhosis      HCC (hepatocellular carcinoma)      History of BPH      History of laparoscopic cholecystectomy      History of lumbar laminectomy      H/O prior ablation treatment      H/O percutaneous left heart catheterization        Vitals:  T(C): 37.8 (12-18-24 @ 23:00), Max: 37.8 (12-18-24 @ 23:00)  HR: 94 (12-19-24 @ 00:55) (75 - 101)  BP: 109/56 (12-19-24 @ 00:00) (84/48 - 158/67)  BP(mean): 78 (12-19-24 @ 00:00) (60 - 112)  RR: 30 (12-19-24 @ 00:00) (21 - 41)  SpO2: 96% (12-19-24 @ 00:55) (95% - 100%)  Wt(kg): --  Daily     Daily   I&O's Summary    17 Dec 2024 07:01  -  18 Dec 2024 07:00  --------------------------------------------------------  IN: 2794.5 mL / OUT: 3495 mL / NET: -700.5 mL    18 Dec 2024 07:01  -  19 Dec 2024 01:04  --------------------------------------------------------  IN: 1941.3 mL / OUT: 1995 mL / NET: -53.7 mL        Physical Exam:  Appearance: Ventilated via trach, encephalopathic  Eyes: PERRL, EOMI, pink conjunctiva  HENT: +trach  Cardiovascular: RRR, S1, S2, no murmurs, rubs, or gallops; no edema; no JVD  Respiratory: Clear to auscultation bilaterally  Gastrointestinal: soft, non-tender, non-distended with normal bowel sounds  Musculoskeletal: No clubbing; no joint deformity   Skin: Sacral wound                          7.7    15.46 )-----------( 60       ( 18 Dec 2024 22:15 )             23.1     12-18    140  |  105  |  103[H]  ----------------------------<  237[H]  3.9   |  19[L]  |  2.19[H]    Ca    7.5[L]      18 Dec 2024 22:15  Phos  4.7     12-18  Mg     2.2     12-18    TPro  3.8[L]  /  Alb  3.0[L]  /  TBili  16.9[H]  /  DBili  x   /  AST  82[H]  /  ALT  85[H]  /  AlkPhos  253[H]  12-18    PT/INR - ( 18 Dec 2024 06:47 )   PT: 20.7 sec;   INR: 1.81 ratio         PTT - ( 18 Dec 2024 06:47 )  PTT:45.4 sec      Cardiovascular Diagnostic Testing:  ECG: sinus rhythm    Echo: < from: Intra-Operative Transesophageal Echo W or WO Ultrasound Enhancing Agent (11.15.24 @ 07:46) >  --------------------------------------------------------------------------------  PRE-BYPASS FINDINGS     Left Ventricle:  Left ventricular ejection fraction is estimated at 60 to 65%. Normal left ventricularwall thickness. The left ventricular systolic function is normal There are no regional wall motion abnormalities seen.     Right Ventricle:  The right ventricular cavity is normal in size, with normal wall thickness and right ventricular systolic function is normal. Right ventricular systolic function is normal.     Left Atrium:  The left atrium is normal in size. No thrombus visualized in left atrial appendage.     Right Atrium:  The right atrium is normal in size. The right atrium is normal in size.     Interatrial Septum:  No PFO visualized with color flow doppler.     Aortic Valve:  The aortic valve appears trileaflet. There is no aortic valve stenosis. There is trace aortic regurgitation.     Mitral Valve:  There is no mitral valve stenosis. There is no mitral valve stenosis. There is trace mitral regurgitation.     Tricuspid Valve:  There is no evidence of tricuspid stenosis. There is trace tricuspid regurgitation.     Pericardium:  No pericardial effusion seen.     Post-Bypass:  S/P OLT. Under current loading conditions, hyperdynamic left ventricular systolic function, EF: 70-75% by visual estimate, no RWMA. Normal right ventricular systolic function. All other findings unchanged. Probe removed atraumatically, no blood. The patient tolerated the procedure well and without complications. Permanent recorded images are stored in the medical record.     Electronically signed by James Villareal on 11/15/2024 at 2:18:16 PM         *** Final ***    < end of copied text >      < from: TTE Limited W or WO Ultrasound Enhancing Agent (12.11.24 @ 09:28) >  _______________________________________________________________________________________     CONCLUSIONS:      1. Endocardial visualization enhanced with Definity (Ultrasound Enhancing Agent).   2. Left ventricular systolic function is normal with an ejection fraction visually estimated at 55 to 60 %.   3. Enlarged right ventricular cavity size and mildly reduced right ventricular systolic function.   4. Device lead is visualized in the right heart.    ________________________________________________________________________________________    < end of copied text >      Stress Testing:     Cath: 4/24/2024, patient had his LHC repeated with Ben Villareal MD at [facility].  Cath showed multivessel coronary artery disease, but the lesions previously noted were FFR negative.  He continues to have MIRTA 3 flow throughout.    Interpretation of Telemetry: Sinus     Imaging:

## 2024-12-18 NOTE — PROGRESS NOTE ADULT - ASSESSMENT
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 Post-op course c/b worsening mental status and acute hypoxic respiratory failure requiring multiple intubations/extubations, currently extubated (as of 11/26/24) and colonic ileus s/p several rounds of Relistor and Neostigmine with NGT and rectal tube currently in place now with septic shock of unclear etiology. Transplant nephrology consulted for metabolic acidosis and MORAIMA.    1. s/p OLT 11/15/24 with oliguric MORAIMA in setting of septic shock.  - Likely hemodynamically mediated in setting of cardiogenic and septic shock on multiple vasopressors and IABP.   - SCr on admission was 0.48 11/15/24. SCr elevated at 2.59. Pt. is non-oliguric, UOP today is 1.92L in 24 hours  - Urinalysis trace ketones, protein 30, 17 casts.   - CT AP non-con: Bilateral renal cysts including cysts with peripheral calcification in the left kidney.  - Pt. initiated on CRRT 12/7/24- 12/15/24 at 1AM stopped.  - Electrolyte, labs, and volume status reviewed. Worsening uremia, will dialyze iHD today.      2. Metabolic Acidosis   - AGMA in setting of septic shock, starvation ketosis, lactic acidosis and MORAIMA.  - BHB was 2.3, not repeated. Improving.    If you have any questions, please feel free to contact me:  Magaly Tello MD PGY-4  Nephrology Fellow  [employer] Teams (Preferred)/ Pager [phone]   (After 5pm or on weekends please page the on-call fellow

## 2024-12-18 NOTE — ADVANCED PRACTICE NURSE CONSULT - ASSESSMENT
Chart reviewed & events noted. In at bedside with colleague & PT wound care Sharon Morgan for routine vac dsg & complete ostomy pouching system changes. (pls see separate notes).Chart reviewed & events noted to date. Complete pouching system changed. Stoma 1 5/8" red & viable, scant liquid bilious effluent noted in pouch. Some bleeding noted at mucocutaneous junction controlled by gentle pressure. Peristomal skin & mucocutaneous junction intact. + creases/skin indents at 3&9 o'clock. Repouched w/ 2 1/4" flat skin barrier, bead of stoma paste applied to skin creases to level surface & at the back of skin barrier near opening to caulk & drainable pouch. Supplies with pattern left at bedside.

## 2024-12-18 NOTE — PROGRESS NOTE ADULT - ASSESSMENT
74 year-old with known coronary artery disease as above presents for liver transplant.  Seen to have chest pain post transplant.  It was atypical of angina and has resolved.  Troponin remained negative.    PAF - currently sinus rhythm     Patient was septic on Friday, 12/6, and at that time, he was seen to be hyperdynamic with TODD and severe LVOT gradient.  Post ex-lap, patient seen to have newly reduced LVEF.  IABP placed. Inotrope and pressor support. IABP removed 12/10 without significant drop in cardiac output.  Weaned off both inotrope and pressors. Now restarted on beta-blocker.  Keep MAP >65 mmHg. Monitor output via [place].    Heart Failure follow-up appreciated.

## 2024-12-18 NOTE — PROGRESS NOTE ADULT - SUBJECTIVE AND OBJECTIVE BOX
SICU Daily Progress Note  =====================================================  Interval/Overnight Events:     - 1u platelets, 1u FFP, DDVAP  - s/p bedside tracheostomy  - Albumin 500cc for decreased UOP    Allergies: No Known Allergies      MEDICATIONS:   --------------------------------------------------------------------------------------  Neurologic Medications    Respiratory Medications  albuterol/ipratropium for Nebulization 3 milliLiter(s) Nebulizer every 6 hours  buDESOnide    Inhalation Suspension 0.5 milliGRAM(s) Inhalation every 12 hours    Cardiovascular Medications  metoprolol tartrate 50 milliGRAM(s) Enteral Tube every 8 hours  norepinephrine Infusion 0.05 MICROgram(s)/kG/Min IV Continuous <Continuous>    Gastrointestinal Medications  pantoprazole  Injectable 40 milliGRAM(s) IV Push daily  phytonadione  IVPB 10 milliGRAM(s) IV Intermittent daily    Genitourinary Medications    Hematologic/Oncologic Medications    Antimicrobial/Immunologic Medications  caspofungin IVPB      caspofungin IVPB 50 milliGRAM(s) IV Intermittent every 24 hours  meropenem  IVPB 1000 milliGRAM(s) IV Intermittent every 8 hours    Endocrine/Metabolic Medications  insulin lispro (ADMELOG) corrective regimen sliding scale   SubCutaneous every 4 hours  methylPREDNISolone sodium succinate Injectable 16 milliGRAM(s) IV Push <User Schedule>    Topical/Other Medications  chlorhexidine 0.12% Liquid 15 milliLiter(s) Oral Mucosa every 12 hours  chlorhexidine 2% Cloths 1 Application(s) Topical <User Schedule>  nystatin Powder 1 Application(s) Topical every 12 hours    --------------------------------------------------------------------------------------    VITAL SIGNS, INS/OUTS (last 24 hours):  --------------------------------------------------------------------------------------  T(C): 36.7 (12-17-24 @ 23:00), Max: 37.3 (12-17-24 @ 03:00)  HR: 86 (12-18-24 @ 00:22) (73 - 99)  BP: 92/54 (12-18-24 @ 00:00) (66/37 - 156/71)  RR: 23 (12-18-24 @ 00:00) (13 - 32)  SpO2: 98% (12-18-24 @ 00:22) (97% - 100%)    12-16-24 @ 07:01  -  12-17-24 @ 07:00  --------------------------------------------------------  IN: 2894.5 mL / OUT: 4260 mL / NET: -1365.5 mL    12-17-24 @ 07:01  -  12-18-24 @ 01:07  --------------------------------------------------------  IN: 2064.5 mL / OUT: 2675 mL / NET: -610.5 mL      --------------------------------------------------------------------------------------    EXAM  NEUROLOGY  Exam: NAD    RESPIRATORY  Exam: Tracheostomy site clean. dry, unlabored respiratory effort  Mechanical Ventilation: Mode: AC/ CMV (Assist Control/ Continuous Mandatory Ventilation), RR (machine): 14, TV (machine): 500, FiO2: 30, PEEP: 5, ITime: 1, MAP: 8.4, PIP: 19    CARDIOVASCULAR  Exam: Regular rate and rhythm    GI/NUTRITION  Exam: Abdomen soft, non-distended      LABS  --------------------------------------------------------------------------------------                        7.9    17.39 )-----------( 69       ( 17 Dec 2024 23:39 )             23.9   12-17    139  |  103  |  104[H]  ----------------------------<  222[H]  4.3   |  16[L]  |  2.40[H]    Ca    7.3[L]      17 Dec 2024 23:39  Phos  6.9     12-17  Mg     2.3     12-17    TPro  3.8[L]  /  Alb  3.1[L]  /  TBili  17.3[H]  /  DBili  x   /  AST  81[H]  /  ALT  76[H]  /  AlkPhos  224[H]  12-17  ABG - ( 16 Dec 2024 12:00 )  pH, Arterial: 7.41  pH, Blood: x     /  pCO2: 31    /  pO2: 159   / HCO3: 20    / Base Excess: -4.3  /  SaO2: 99.2            Urinalysis Basic - ( 17 Dec 2024 23:39 )    Color: x / Appearance: x / SG: x / pH: x  Gluc: 222 mg/dL / Ketone: x  / Bili: x / Urobili: x   Blood: x / Protein: x / Nitrite: x   Leuk Esterase: x / RBC: x / WBC x   Sq Epi: x / Non Sq Epi: x / Bacteria: x    PT/INR - ( 17 Dec 2024 06:19 )   PT: 20.4 sec;   INR: 1.80 ratio         PTT - ( 17 Dec 2024 06:19 )  PTT:53.0 sec  --------------------------------------------------------------------------------------

## 2024-12-18 NOTE — PROGRESS NOTE ADULT - ASSESSMENT
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 Patient's post-op course has been c/b multiple reintubation, total colectomy w/ ileostomy, and IABP placement 2/2 cardiogenic shock with subsequent removal. S/p bedside tracheostomy 12/17.    Neuro:  - Follows commands, off sedation  - pain control w/ oxycodone  - CT head 11/19, 11/21, 11/25, 11/29, 12/5 negative  - EEG: Mild diffuse cerebral dysfunction that is not specific in etiology. No epileptic discharges recorded. No seizures recorded.  - Holding home xanax, Abilify, Zoloft, Remeron, Lexapro     Resp:  - S/p bedside tracheostomy 12/17  -PRVC 14/500/5/30  -SBT daily  -c/w inhaled bronchodilator  -VBG daily    CV:  -IABP removed 12/10  -dobutamine gtt d/c'ed 12/15  -s/p amiodarone gtt  - Metoprolol 50 q8hr  -trend lactate  -hold home metoprolol  -TTE 12/11 shows EF of 55-60%    GI:  -trend LFTs  -NPO w/ tube feeds (held for trach)  -KO tube post pyloric  -protonix QD  -high output from ALYSSA drains, continue to monitor quantity & quality    /Renal:  -off CRRT since 12/15  -Minimal response to lasix 12/15, given bumex 2mg and metolazone 10mg x1  -Monitor BUN/Cr  -I&Os, trend UOP  -elevate scrotum i/s/o edema   -Scrotal US 12/15 -> edema, no abscess    Heme:  -trend H/H  -monitor coags    ID:  -ABx transition to jeniffer, caspo (12/16-)  -monitor WBC, fever curve  -CMV PPx w/ ganciclovir (holding now iso thrombocytopenia)  -holding cellcept, cyclosporine  -f/u cultures from 12/16  -UA positive 12/16    Endo:  -monitor glucose   -SDS   -Insulin gtt 12/16    Lines:   -KO tube, remove NGT  -nichols exchanged 12/16  -RIJ (12/7)  -LIJ (12/7)  -ALYSSA x 2

## 2024-12-18 NOTE — PHARMACOTHERAPY INTERVENTION NOTE - COMMENTS
STERLING BRYANT, 74y Male PMHx of HTN, DM, AF, BPH, MASH cirrhosis and HCC s/p OLT 11/2024. Patient's post-op course has been c/b multiple reintubation, total colectomy w/ ileostomy, and IABP placement 2/2 cardiogenic shock with subsequent removal. S/p bedside tracheostomy 12/17.     Creatinine: 2.59 mg/dL (12-18-24 @ 12:10)  Creatinine: 2.40 mg/dL (12-17-24 @ 23:39)    I&O's Summary    17 Dec 2024 07:01  -  18 Dec 2024 07:00  --------------------------------------------------------  IN: 2794.5 mL / OUT: 3495 mL / NET: -700.5 mL    18 Dec 2024 07:01  -  18 Dec 2024 14:33  --------------------------------------------------------  IN: 865 mL / OUT: 670 mL / NET: 195 mL    MEDICATIONS  (STANDING):  albuterol/ipratropium for Nebulization 3 milliLiter(s) Nebulizer every 6 hours  buDESOnide    Inhalation Suspension 0.5 milliGRAM(s) Inhalation every 12 hours  caspofungin IVPB 50 milliGRAM(s) IV Intermittent every 24 hours  insulin lispro (ADMELOG) corrective regimen sliding scale   SubCutaneous every 4 hours  insulin NPH human recombinant 4 Unit(s) SubCutaneous every 6 hours  meropenem  IVPB 1000 milliGRAM(s) IV Intermittent every 8 hours  methylPREDNISolone sodium succinate Injectable 16 milliGRAM(s) IV Push <User Schedule>  metoprolol tartrate 50 milliGRAM(s) Enteral Tube every 8 hours  nystatin Powder 1 Application(s) Topical every 12 hours  pantoprazole  Injectable 40 milliGRAM(s) IV Push daily    MEDICATIONS  (PRN):  albumin human  5% IVPB 500 milliLiter(s) IV Intermittent every 12 hours PRN see special instructions    Recommendation(s):  1) Adjust meropenem dose from 1000mg q8h to 1000mg q12h    Frida Aviles, PharmD, BCCCP  Clinical Pharmacist, Critical Care  Available via [employer] Teams

## 2024-12-18 NOTE — ADVANCED PRACTICE NURSE CONSULT - RECOMMEDATIONS
Will recommend:  1. Monitor output.  2. Empty pouch when 1/3-1/2 full   3. Change pouching system every 3-4 days & prn leakage  4. Contact ostomy specialists if questions, concerns/issues .  5. Supplies: Ovid 2 1/4" Ceraplus flat skin barrier (#37344), Lianne 2 1/4" drainable pouch (#24838); Accessory products:  stoma paste #[id number], stoma powder (#8153) & Cavilon No sting barrier film wipe (#0367)  Will f/u for ostomy education when appropriate. Pt remains acute requiring SICU care.

## 2024-12-18 NOTE — PROGRESS NOTE ADULT - SUBJECTIVE AND OBJECTIVE BOX
[facility] DIVISION OF KIDNEY DISEASES AND HYPERTENSION -- FOLLOW UP NOTE  --------------------------------------------------------------------------------  Chief Complaint:   s/p OLT 11/15/24 with oliguric MORAIMA in setting of cardiogenic/septic shock.    24 hour events/subjective: Pt. seen and examined at bedside earlier today. No fever, CP, SOB, LE edema, HA or dizziness during rounds today. Non-oliguric with 1.9L UOP in 24 hours.        PAST HISTORY  --------------------------------------------------------------------------------  No significant changes to PMH, PSH, FHx, SHx, unless otherwise noted    ALLERGIES & MEDICATIONS  --------------------------------------------------------------------------------  Allergies    No Known Allergies    Intolerances      Standing Inpatient Medications  albuterol/ipratropium for Nebulization 3 milliLiter(s) Nebulizer every 6 hours  buDESOnide    Inhalation Suspension 0.5 milliGRAM(s) Inhalation every 12 hours  caspofungin IVPB 50 milliGRAM(s) IV Intermittent every 24 hours  caspofungin IVPB      chlorhexidine 0.12% Liquid 15 milliLiter(s) Oral Mucosa every 12 hours  chlorhexidine 2% Cloths 1 Application(s) Topical <User Schedule>  insulin lispro (ADMELOG) corrective regimen sliding scale   SubCutaneous every 4 hours  insulin NPH human recombinant 4 Unit(s) SubCutaneous every 6 hours  meropenem  IVPB 1000 milliGRAM(s) IV Intermittent every 12 hours  methylPREDNISolone sodium succinate Injectable 16 milliGRAM(s) IV Push <User Schedule>  metoprolol tartrate 50 milliGRAM(s) Enteral Tube every 8 hours  nystatin Powder 1 Application(s) Topical every 12 hours  pantoprazole  Injectable 40 milliGRAM(s) IV Push daily    PRN Inpatient Medications  albumin human  5% IVPB 500 milliLiter(s) IV Intermittent every 12 hours PRN      REVIEW OF SYSTEMS  --------------------------------------------------------------------------------  Unable to obtain ROS due to current clinical status.     VITALS/PHYSICAL EXAM  --------------------------------------------------------------------------------  T(C): 37.1 (12-18-24 @ 11:00), Max: 37.1 (12-18-24 @ 07:00)  HR: 84 (12-18-24 @ 13:00) (73 - 94)  BP: 123/60 (12-18-24 @ 13:00) (66/37 - 151/60)  RR: 30 (12-18-24 @ 13:00) (14 - 38)  SpO2: 99% (12-18-24 @ 13:00) (95% - 100%)  Wt(kg): --        12-17-24 @ 07:01  -  12-18-24 @ 07:00  --------------------------------------------------------  IN: 2794.5 mL / OUT: 3495 mL / NET: -700.5 mL    12-18-24 @ 07:01 - 12-18-24 @ 13:30  --------------------------------------------------------  IN: 865 mL / OUT: 670 mL / NET: 195 mL      Physical Exam:  Gen: NAD, able to speak in full sentences   HEENT: PERRL, MMM   Pulm: CTA B/L, no crackles   CV: RRR, S1S2+  Abd: +BS, soft  Transplant: No tenderness, swelling  : No suprapubic tenderness  MSK: no edema   Psych: Normal affect and mood  Skin: Warm  Access: RIJ non-tunneled HD catheter.     LABS/STUDIES  --------------------------------------------------------------------------------              7.6    12.35 >-----------<  61       [12-18-24 @ 06:47]              22.6     139  |  102  |  116  ----------------------------<  319      [12-18-24 @ 12:10]  4.4   |  14  |  2.59        Ca     7.5     [12-18-24 @ 12:10]      Mg     2.3     [12-18-24 @ 12:10]      Phos  6.7     [12-18-24 @ 12:10]    TPro  3.7  /  Alb  2.9  /  TBili  18.6  /  DBili  x   /  AST  123  /  ALT  97  /  AlkPhos  295  [12-18-24 @ 12:10]    PT/INR: PT 20.7 , INR 1.81       [12-18-24 @ 06:47]  PTT: 45.4       [12-18-24 @ 06:47]      Creatinine Trend:  SCr 2.59 [12-18 @ 12:10]  SCr 2.40 [12-17 @ 23:39]  SCr 2.45 [12-17 @ 16:21]  SCr 1.99 [12-17 @ 00:40]  SCr 1.84 [12-16 @ 18:09]      Vitamin D (25OH) <6.0      [11-21-24 @ 08:32]  TSH 0.68      [12-12-24 @ 12:27]  Lipid: chol 114, , HDL 47, LDL --      [04-24-24 @ 06:48]

## 2024-12-18 NOTE — PROGRESS NOTE ADULT - SUBJECTIVE AND OBJECTIVE BOX
Transplant Surgery - Multidisciplinary Rounds  --------------------------------------------------------------  OLT   11/15/2024        POD#33  Colectomy 12/7/2024   POD#11  IABP 12/7 removed 12/10    Present:   Patient seen and examined with multidisciplinary Transplant team including Surgeon: Dr. Vora, Dr. Sorto, JAZZ Velasquez , Hepatologist: Dr. Malagon and ICU team in AM rounds.   Disciplines not in attendance will be notified of the plan.     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     s/p OLT 11/15 with post op course c/b:  Hypoxia: Reintubated 11/20, extubated 11/24->reintubated 11/24, extubated 11/26, reintubated 12/7  Ileus   Fever  A fib  AMS/Seizure   L brachial DVT  Neutropenia  E coli Bacteremia (12/6)  s/p Coloctomy 12/7.   IABP 12/7, removed 12/10    Interval/Overnight Events:                     Immunosuppression:  -Cyclo 25BID, MMF held, Solu 16  -ongoing monitoring for signs of rejection                    ROS: Unable to assess patient is intubated, sedated    PHYSICAL EXAM:   Constitutional: trach to vent , sedated   Eyes:  PERRLA  ENMT: nc/at, no thrush   Neck: supple   Respiratory: CTA B/L  Cardiovascular: RRR  Gastrointestinal: incision clean/dry/intact + Ostomy pink   Genitourinary: +scrotal edema, Castro in place  Extremities: SCD's in place and working bilaterally, + LE edema  Neurological: trach to vent , sedated   Skin: no rashes, ulcerations, lesions  Transplant Surgery - Multidisciplinary Rounds  --------------------------------------------------------------  OLT   11/15/2024        POD#33  Colectomy 12/7/2024   POD#11  IABP 12/7 removed 12/10  Tracheostomy 12/17/2024 POD#1    Present:   Patient seen and examined with multidisciplinary Transplant team including Surgeon: Dr. Vora, Dr. Sorto, JAZZ Velasquez , Hepatologist: Dr. Malagon and ICU team in AM rounds.   Disciplines not in attendance will be notified of the plan.     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     s/p OLT 11/15 with post op course c/b:  Hypoxia: Reintubated 11/20, extubated 11/24->reintubated 11/24, extubated 11/26, reintubated 12/7  Ileus   Fever  A fib  AMS/Seizure   L brachial DVT  Neutropenia  E coli Bacteremia (12/6)  s/p Coloctomy 12/7.   IABP 12/7, removed 12/10    Interval/Overnight Events:   - Afebrile, intermittent sinus bradycardia, normotensive off pressors  - Restarted Cyclo 25 BID  - S/p tracheostomy (1u FFP, 1u PLT, DDAVP prior to placement)      Immunosuppression:  -Cyclo 25BID, MMF held, Solu 16  -ongoing monitoring for signs of rejection      MEDICATIONS  (STANDING):  albuterol/ipratropium for Nebulization 3 milliLiter(s) Nebulizer every 6 hours  buDESOnide    Inhalation Suspension 0.5 milliGRAM(s) Inhalation every 12 hours  caspofungin IVPB      caspofungin IVPB 50 milliGRAM(s) IV Intermittent every 24 hours  chlorhexidine 0.12% Liquid 15 milliLiter(s) Oral Mucosa every 12 hours  chlorhexidine 2% Cloths 1 Application(s) Topical <User Schedule>  insulin lispro (ADMELOG) corrective regimen sliding scale   SubCutaneous every 4 hours  insulin NPH human recombinant 4 Unit(s) SubCutaneous every 6 hours  meropenem  IVPB 1000 milliGRAM(s) IV Intermittent every 12 hours  methylPREDNISolone sodium succinate Injectable 16 milliGRAM(s) IV Push <User Schedule>  metoprolol tartrate 50 milliGRAM(s) Enteral Tube every 8 hours  nystatin Powder 1 Application(s) Topical every 12 hours  pantoprazole  Injectable 40 milliGRAM(s) IV Push daily    MEDICATIONS  (PRN):  albumin human  5% IVPB 500 milliLiter(s) IV Intermittent every 12 hours PRN see special instructions      PAST MEDICAL & SURGICAL HISTORY:  Diabetes      Transaminitis      Paroxysmal atrial fibrillation      Depression      BPH (benign prostatic hyperplasia)      Hypertension      Chronic atrial fibrillation      Coronary artery disease      Hepatocellular carcinoma      DM (diabetes mellitus)      HTN (hypertension)      Paroxysmal atrial fibrillation      Cirrhosis      HCC (hepatocellular carcinoma)      History of BPH      History of laparoscopic cholecystectomy      History of lumbar laminectomy      H/O prior ablation treatment      H/O percutaneous left heart catheterization          Vital Signs Last 24 Hrs  T(C): 37.1 (18 Dec 2024 11:00), Max: 37.1 (18 Dec 2024 07:00)  T(F): 98.8 (18 Dec 2024 11:00), Max: 98.8 (18 Dec 2024 07:00)  HR: 84 (18 Dec 2024 13:00) (73 - 94)  BP: 123/60 (18 Dec 2024 13:00) (66/37 - 151/60)  BP(mean): 87 (18 Dec 2024 13:00) (46 - 99)  RR: 30 (18 Dec 2024 13:00) (14 - 38)  SpO2: 99% (18 Dec 2024 13:00) (95% - 100%)    Parameters below as of 18 Dec 2024 11:23  Patient On (Oxygen Delivery Method): ventilator        I&O's Summary    17 Dec 2024 07:01  -  18 Dec 2024 07:00  --------------------------------------------------------  IN: 2794.5 mL / OUT: 3495 mL / NET: -700.5 mL    18 Dec 2024 07:01  -  18 Dec 2024 13:22  --------------------------------------------------------  IN: 865 mL / OUT: 670 mL / NET: 195 mL                              7.6    12.35 )-----------( 61       ( 18 Dec 2024 06:47 )             22.6     12-18    139  |  102  |  116[H]  ----------------------------<  319[H]  4.4   |  14[L]  |  2.59[H]    Ca    7.5[L]      18 Dec 2024 12:10  Phos  6.7     12-18  Mg     2.3     12-18    TPro  3.7[L]  /  Alb  2.9[L]  /  TBili  18.6[H]  /  DBili  x   /  AST  123[H]  /  ALT  97[H]  /  AlkPhos  295[H]  12-18          Culture - Urine (collected 12-16-24 @ 13:10)  Source: Catheterized Catheterized  Preliminary Report (12-18-24 @ 13:16):    10,000 - 49,000 CFU/mL Enterococcus faecalis    Culture - Blood (collected 12-16-24 @ 13:00)  Source: .Blood BLOOD  Preliminary Report (12-17-24 @ 18:01):    No growth at 24 hours    Culture - Bronchial (collected 12-16-24 @ 08:36)  Source: Combi-Cath  Gram Stain (12-16-24 @ 16:27):    No polymorphonuclear leukocytes seen per low power field    No Squamous epithelial cells seen per low power field    No organisms seen per oil power field  Final Report (12-17-24 @ 23:17):    Commensal charity consistent with body site    Culture - Blood (collected 12-16-24 @ 07:45)  Source: .Blood BLOOD  Preliminary Report (12-18-24 @ 12:01):    No growth at 48 Hours    Culture - Bronchial (collected 12-14-24 @ 03:17)  Source: Combi-Cath  Gram Stain (12-14-24 @ 14:01):    Numerous polymorphonuclear leukocytes per low power field    No Squamous epithelial cells per low power field    No organisms seen per oil power field  Final Report (12-15-24 @ 16:14):    Commensal charity consistent with body site    Culture - Fungal, Other (collected 12-12-24 @ 18:00)  Source: .Other  Preliminary Report (12-14-24 @ 23:02):    Culture is being performed. Fungal cultures are held for 4 weeks.      ROS: Unable to assess patient is lethargic, s/p tracheostomy    PHYSICAL EXAM:   Constitutional: trach to vent, lethargic  Eyes:  PERRLA  ENMT: nc/at, no thrush   Neck: supple   Respiratory: CTA B/L  Cardiovascular: RRR  Gastrointestinal: incision clean/dry/intact + Ostomy pink   Genitourinary: +scrotal edema, Castro in place  Extremities: SCD's in place and working bilaterally, + LE edema  Neurological: trach to vent , sedated   Skin: no rashes, ulcerations, lesions

## 2024-12-18 NOTE — PROGRESS NOTE ADULT - ASSESSMENT
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   s/p OLT 11/15 with complicated post op course    [] Septic shock/Cardiogenic shock  [] s/p Colectomy 12/7 POD# 11  [] RTOR for closure and Ileostomy creation   [] IAPB 12/7- removed 12/10: CTICU on board  - E coli Bacteremia (12/6) - on jeniffer/caspo -> switched to  cefepime/fluc. Completed 7days of emperic flagyl.  Received Tobra x 1 12/6 .   - c/w cefepime due to leukocytosis.  - Fluc 400mg   - Neutropenia: received Neupogen 480mcg x2  - MORAIMA: CRRT started 12/7, stopped 12/15  - AMS; CT Head neg  - scrotal edema: repeat scrotal us; neg for abscess  - Hold diuretics   - f/u urine Cx  - BCx (12/16): no growth to date and UA (12/16) +nitrite and leukocyte esterase   - On meropenem and caspofungin as per ID  - Vitamin K x 3 days 12/16-12/19  - S/P tracheotomy 12/17    [] s/p OLT POD #33  - trend LFT's  - Diet: increase TFs as needed  - Pain management: avoid narcotics  - Strict I&Os, nichols, wound vac placed 12/10   - US: L brachial DVT   - wound vac exchange for ileostomy (12/13)  - HIT panel neg (12/14)    [ ] Immunosuppression  - Tacrolimus stopped 11/18 in setting of AMS/Seizure, Started Cyclo 12/17  - Cyclo 25BID, MMF held, Solumedrol 16mg daily  - PPx: Gancyclovir (HELD), bactrim (HELD) in setting of thrombocytopenia    [] lleus   -@ home on Linzess  - imaging with distended colon (likely opioid induced)  - s/p Neostigmine x 2  - s/p Relistor, last dose 12/1  - s/p colectomy with end ileostomy  (see above)  - Daily AXR     [] CMV viremia  - d/c gancylovir due to thrombocytopenia  - CMV PCR (12/11 and 12/16): neg    [ ] AMS  - Reintubated 11/20 Aspiration/hypoxia, extubated 11/24->[id number]--> extubated 11/26 -> [id number]  - EEG 11/30 negative, Neurology following  - BH following   - off tacro  - on keppra    [ ] HTN/ pAFib  - Off Amiodarone, off dobutamine  - metoprolol held (hypotensive)   - Eliquis 5mg bid - held 12/6.   - Dr. 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   s/p OLT 11/15 with complicated post op course    [] Septic shock/Cardiogenic shock  [] s/p Colectomy 12/7 POD# 11  [] RTOR for closure and Ileostomy creation   [] IAPB 12/7- removed 12/10: CTICU on board  - E coli Bacteremia (12/6) - on jeniffer/caspo -> switched to  cefepime/fluc. Completed 7days of emperic flagyl.  Received Tobra x 1 12/6 .   - c/w cefepime due to leukocytosis.  - Neutropenia: received Neupogen 480mcg x2  - MORAIMA: CRRT started 12/7, stopped 12/15  - AMS; CT Head neg  - scrotal edema: repeat scrotal us; neg for abscess  - Hold diuretics   - f/u urine Cx  - BCx (12/16): no growth to date and UA (12/16) +nitrite and leukocyte esterase   - On meropenem and caspofungin as per ID  - Vitamin K x 3 days 12/16-12/19  - S/P tracheotomy 12/17    [] s/p OLT POD #33  - trend LFT's  - Diet: increase TFs as needed  - Pain management: avoid narcotics  - Strict I&Os, nichols, wound vac placed 12/10   - US: L brachial DVT   - wound vac exchange for ileostomy (12/13)  - HIT panel neg (12/14)    [ ] Immunosuppression  - Tacrolimus stopped 11/18 in setting of AMS/Seizure, Started Cyclo 12/17  - Cyclo 25BID, MMF held, Solumedrol 16mg daily  - PPx: Gancyclovir (HELD), bactrim (HELD) in setting of thrombocytopenia    [] lleus   -@ home on Linzess  - imaging with distended colon (likely opioid induced)  - s/p Neostigmine x 2  - s/p Relistor, last dose 12/1  - s/p colectomy with end ileostomy  (see above)  - Daily AXR     [] CMV viremia  - d/c gancylovir due to thrombocytopenia  - CMV PCR (12/11 and 12/16): neg    [ ] AMS  - Reintubated 11/20 Aspiration/hypoxia, extubated 11/24->[id number]/24--> extubated 11/26 -> [id number] -> tracheostomy 12/17  - EEG 11/30 negative, Neurology following  - BH following   - off tacro  - on keppra    [ ] HTN/ pAFib  - Off Amiodarone, off dobutamine  - metoprolol held (hypotensive)   - Eliquis 5mg bid - held 12/6.   - Dr. Quintanilla following    [ ] DM  - ISS     []Scrotal abscess   - US (12/12): 2.1 x0.9 x 3.2 cm focal, right testicle- possible abscess (12/12)  - Urology c/s recs nothing to do, repeat ultrasound   - 12/15 CT CAP no acute changes

## 2024-12-18 NOTE — CHART NOTE - NSCHARTNOTEFT_GEN_A_CORE
I have seen the patient and reviewed dialysis prescription and flowsheet. Dialysis access is functioning. Dialysis prescription has been adjusted for optimized control of volume status, uremia and electrolytes. Management of additional metabolic abnormalities/anemia will continue to be addressed on follow up.

## 2024-12-19 NOTE — PROGRESS NOTE ADULT - SUBJECTIVE AND OBJECTIVE BOX
Follow Up:      Interval History/ROS:Patient is a 74y old  Male who presents with a chief complaint of Liver Transplant (19 Dec 2024 03:05)      REVIEW OF SYSTEMS  [  ] ROS unobtainable because:    [  ] All other systems negative except as noted below    Constitutional:  [ ] fever [ ] chills  [ ] weight loss  [ ]night sweat  [ ]poor appetite/PO intake [ ]fatigue   Skin:  [ ] rash [ ] phlebitis	  Eyes: [ ] icterus [ ] pain  [ ] discharge	  ENMT: [ ] sore throat  [ ] thrush [ ] ulcers [ ] exudates [ ]anosmia  Respiratory: [ ] dyspnea [ ] hemoptysis [ ] cough [ ] sputum	  Cardiovascular:  [ ] chest pain [ ] palpitations [ ] edema	  Gastrointestinal:  [ ] nausea [ ] vomiting [ ] diarrhea [ ] constipation [ ] pain	  Genitourinary:  [ ] dysuria [ ] frequency [ ] hematuria [ ] discharge [ ] flank pain  [ ] incontinence  Musculoskeletal:  [ ] myalgias [ ] arthralgias [ ] arthritis  [ ] back pain  Neurological:  [ ] headache [ ] weakness [ ] seizures  [ ] confusion/altered mental status    Allergies  No Known Allergies        ANTIMICROBIALS:    caspofungin IVPB 50 every 24 hours  caspofungin IVPB    meropenem  IVPB 1000 every 12 hours      OTHER MEDS: MEDICATIONS  (STANDING):  albuterol/ipratropium for Nebulization 3 every 6 hours  buDESOnide    Inhalation Suspension 0.5 every 12 hours  insulin lispro (ADMELOG) corrective regimen sliding scale  every 4 hours  insulin NPH human recombinant 12 every 6 hours  methylPREDNISolone sodium succinate Injectable 16 <User Schedule>  metoprolol tartrate 50 every 8 hours  pantoprazole  Injectable 40 daily      Vital Signs Last 24 Hrs  T(F): 100.6 (12-19-24 @ 07:00), Max: 100.6 (12-19-24 @ 07:00)    Vital Signs Last 24 Hrs  HR: 90 (12-19-24 @ 09:02) (75 - 101)  BP: 101/49 (12-19-24 @ 07:00) (84/48 - 158/67)  RR: 24 (12-19-24 @ 07:00)  SpO2: 100% (12-19-24 @ 09:02) (95% - 100%)  Wt(kg): --    EXAM:  Physical Exam:  Constitutional:  well preserved, comfortable  Head/Eyes: no icterus, PERRL, EOMI  ENT:  supple; no thrush  LUNGS:  CTA  CVS:  normal S1, S2, no murmur  Abd:  soft, non-tender; non-distended  Ext:  no edema  Vascular:  IV site no erythema tenderness or discharge  MSK:  joints without swelling  Neuro: AAO X 3, non- focal    Labs:                        7.7    15.46 )-----------( 60       ( 18 Dec 2024 22:15 )             23.1     12-18    140  |  105  |  103[H]  ----------------------------<  237[H]  3.9   |  19[L]  |  2.19[H]    Ca    7.5[L]      18 Dec 2024 22:15  Phos  4.7     12-18  Mg     2.2     12-18    TPro  3.8[L]  /  Alb  3.0[L]  /  TBili  16.9[H]  /  DBili  x   /  AST  82[H]  /  ALT  85[H]  /  AlkPhos  253[H]  12-18      WBC Trend:  WBC Count: 15.46 (12-18-24 @ 22:15)  WBC Count: 12.35 (12-18-24 @ 06:47)  WBC Count: 17.39 (12-17-24 @ 23:39)  WBC Count: 16.13 (12-17-24 @ 16:21)      Creatine Trend:  Creatinine: 2.19 (12-18)  Creatinine: 2.59 (12-18)  Creatinine: 2.40 (12-17)  Creatinine: 2.45 (12-17)      Liver Biochemical Testing Trend:  Alanine Aminotransferase (ALT/SGPT): 85 *H* (12-18)  Alanine Aminotransferase (ALT/SGPT): 97 *H* (12-18)  Alanine Aminotransferase (ALT/SGPT): 76 *H* (12-17)  Alanine Aminotransferase (ALT/SGPT): 83 *H* (12-17)  Alanine Aminotransferase (ALT/SGPT): 72 *H* (12-17)  Aspartate Aminotransferase (AST/SGOT): 82 (12-18-24 @ 22:15)  Aspartate Aminotransferase (AST/SGOT): 123 (12-18-24 @ 12:10)  Aspartate Aminotransferase (AST/SGOT): 81 (12-17-24 @ 23:39)  Aspartate Aminotransferase (AST/SGOT): 92 (12-17-24 @ 16:21)  Aspartate Aminotransferase (AST/SGOT): 77 (12-17-24 @ 00:40)  Bilirubin Total: 16.9 (12-18)  Bilirubin Total: 18.6 (12-18)  Bilirubin Total: 17.3 (12-17)  Bilirubin Total: 17.3 (12-17)  Bilirubin Total: 16.7 (12-17)      Trend LDH  12-10-24 @ 00:29  231  12-09-24 @ 00:32  241  12-08-24 @ 18:30  288[H]  12-08-24 @ 12:13  375[H]      Urinalysis Basic - ( 18 Dec 2024 22:15 )    Color: x / Appearance: x / SG: x / pH: x  Gluc: 237 mg/dL / Ketone: x  / Bili: x / Urobili: x   Blood: x / Protein: x / Nitrite: x   Leuk Esterase: x / RBC: x / WBC x   Sq Epi: x / Non Sq Epi: x / Bacteria: x        MICROBIOLOGY:    MRSA PCR Result.: NotLucina (11-24-24 @ 10:14)      Culture - Urine (collected 16 Dec 2024 13:10)  Source: Catheterized Catheterized  Final Report:    10,000 - 49,000 CFU/mL Enterococcus faecalis  Organism: Enterococcus faecalis  Organism: Enterococcus faecalis    Sensitivities:      Method Type: ESAU      -  Ampicillin: S <=2 Predicts results to ampicillin/sulbactam, amoxacillin-clavulanate and  piperacillin-tazobactam.      -  Ciprofloxacin: S <=1      -  Levofloxacin: S <=1      -  Nitrofurantoin: S <=32 Should not be used to treat pyelonephritis.      -  Tetracycline: R >8      -  Vancomycin: S 1    Culture - Blood (collected 16 Dec 2024 13:00)  Source: .Blood BLOOD  Preliminary Report:    No growth at 48 Hours    Culture - Bronchial (collected 16 Dec 2024 08:36)  Source: Combi-Cath  Final Report:    Commensal charity consistent with body site    Culture - Blood (collected 16 Dec 2024 07:45)  Source: .Blood BLOOD  Preliminary Report:    No growth at 48 Hours    Culture - Bronchial (collected 14 Dec 2024 03:17)  Source: Combi-Cath  Final Report:    Commensal charity consistent with body site    Culture - Fungal, Other (collected 12 Dec 2024 18:00)  Source: .Other  Preliminary Report:    Culture is being performed. Fungal cultures are held for 4 weeks.    Culture - Blood (collected 07 Dec 2024 15:30)  Source: .Blood BLOOD  Final Report:    No growth at 5 days    Culture - Body Fluid with Gram Stain (collected 07 Dec 2024 11:18)  Source: Body Fluid  Final Report:    No growth at 5 days    Culture - Blood (collected 06 Dec 2024 13:30)  Source: .Blood BLOOD  Final Report:    Growth in aerobic and anaerobic bottles: Escherichia coli    See previous culture [id number]    Culture - Blood (collected 06 Dec 2024 13:25)  Source: .Blood BLOOD  Final Report:    Growth in aerobic and anaerobic bottles: Escherichia coli    Direct identification is available within approximately 3-5    hours either by Blood Panel Multiplexed PCR or Direct    MALDI-TOF. Details: [email or web address]  Organism: Blood Culture PCR  Escherichia coli  Organism: Escherichia coli    Sensitivities:      Method Type: ESAU      -  Ampicillin: R >16 These ampicillin results predict results for amoxicillin      -  Ampicillin/Sulbactam: R >16/8      -  Aztreonam: I 8      -  Cefazolin: R >16      -  Cefepime: S <=2      -  Cefoxitin: R >16      -  Ceftriaxone: S <=1      -  Ciprofloxacin: S <=0.25      -  Ertapenem: S <=0.5      -  Gentamicin: S <=2      -  Imipenem: S <=1      -  Levofloxacin: S <=0.5      -  Meropenem: S <=1      -  Piperacillin/Tazobactam: SDD 16      -  Tobramycin: S <=2      -  Trimethoprim/Sulfamethoxazole: R >2/38  Organism: Blood Culture PCR    Sensitivities:      Method Type: PCR      -  Escherichia coli: Detec      HIV-1/2 Combo Result: Nonreact (11-15-24 @ 00:41)        Cytomegalovirus By PCR: NotDetec IU/mL (12-16-24 @ 00:48)              Fungitell: 188 pg/mL (12-14 @ 02:15)    COVID-19 PCR: NotDetec (11-15-24 @ 01:34)                    Blood Gas Venous - Lactate: 2.3 (12-18 @ 22:10)  Blood Gas Venous - Lactate: 2.1 (12-18 @ 12:04)  Blood Gas Venous - Lactate: 2.7 (12-18 @ 06:30)  Blood Gas Venous - Lactate: 3.1 (12-18 @ 04:52)      RADIOLOGY:  imaging below personally reviewed   Follow Up:  ID called for fever to 38.1 this AM. Patient also noted to be hypotensive with systolic in the 80s. Spoke with bedside nursing who states patient is more lethargic than yesterday. No open sacral wounds. Patient opens eyes but unable to answer questions. Patient had HD done yesterday as thought to be altered from uremia.       REVIEW OF SYSTEMS  Limited secondary to patient's clinical condition.     Allergies  No Known Allergies        ANTIMICROBIALS:    caspofungin IVPB 50 every 24 hours  caspofungin IVPB    meropenem  IVPB 1000 every 12 hours      OTHER MEDS: MEDICATIONS  (STANDING):  albuterol/ipratropium for Nebulization 3 every 6 hours  buDESOnide    Inhalation Suspension 0.5 every 12 hours  insulin lispro (ADMELOG) corrective regimen sliding scale  every 4 hours  insulin NPH human recombinant 12 every 6 hours  methylPREDNISolone sodium succinate Injectable 16 <User Schedule>  metoprolol tartrate 50 every 8 hours  pantoprazole  Injectable 40 daily      Vital Signs Last 24 Hrs  T(F): 100.6 (12-19-24 @ 07:00), Max: 100.6 (12-19-24 @ 07:00)    Vital Signs Last 24 Hrs  HR: 90 (12-19-24 @ 09:02) (75 - 101)  BP: 101/49 (12-19-24 @ 07:00) (84/48 - 158/67)  RR: 24 (12-19-24 @ 07:00)  SpO2: 100% (12-19-24 @ 09:02) (95% - 100%)  Wt(kg): --    EXAM:  Physical Exam:  Constitutional:  lethargic appearing.   Head/Eyes: PERRL.   ENT:  NG tube in place. +tracheostomy site c/d/i.   LUNGS:  CTA in anterior lung fields.   CVS:  normal S1, S2  Abd:  +hypoactive bowel sounds. +abdominal wound vac filled with brown fluid. 2 abdominal drains with orange to red output.  non-tender; non-distended. +superficial skin breakdown noted to lower abdomen and left inguinal skin region with serous drainage on overlying gauze. +ileostomy bag with liquid brown stool.   Vascular:  IV sites included right dialysis line and left IJ line (placed  12/7) with sites c/d/i.   MSK:  right knee: +swelling with some fluctuance noted to lateral superior knee. No increased warmth ot tenderness to palpation noted.. +diffusely edematous   : nichols in place.   Neuro: AAO X 0. Not following commands. +rigors noted.     Labs:                        7.7    15.46 )-----------( 60       ( 18 Dec 2024 22:15 )             23.1     12-18    140  |  105  |  103[H]  ----------------------------<  237[H]  3.9   |  19[L]  |  2.19[H]    Ca    7.5[L]      18 Dec 2024 22:15  Phos  4.7     12-18  Mg     2.2     12-18    TPro  3.8[L]  /  Alb  3.0[L]  /  TBili  16.9[H]  /  DBili  x   /  AST  82[H]  /  ALT  85[H]  /  AlkPhos  253[H]  12-18      WBC Trend:  WBC Count: 15.46 (12-18-24 @ 22:15)  WBC Count: 12.35 (12-18-24 @ 06:47)  WBC Count: 17.39 (12-17-24 @ 23:39)  WBC Count: 16.13 (12-17-24 @ 16:21)      Creatine Trend:  Creatinine: 2.19 (12-18)  Creatinine: 2.59 (12-18)  Creatinine: 2.40 (12-17)  Creatinine: 2.45 (12-17)      Liver Biochemical Testing Trend:  Alanine Aminotransferase (ALT/SGPT): 85 *H* (12-18)  Alanine Aminotransferase (ALT/SGPT): 97 *H* (12-18)  Alanine Aminotransferase (ALT/SGPT): 76 *H* (12-17)  Alanine Aminotransferase (ALT/SGPT): 83 *H* (12-17)  Alanine Aminotransferase (ALT/SGPT): 72 *H* (12-17)  Aspartate Aminotransferase (AST/SGOT): 82 (12-18-24 @ 22:15)  Aspartate Aminotransferase (AST/SGOT): 123 (12-18-24 @ 12:10)  Aspartate Aminotransferase (AST/SGOT): 81 (12-17-24 @ 23:39)  Aspartate Aminotransferase (AST/SGOT): 92 (12-17-24 @ 16:21)  Aspartate Aminotransferase (AST/SGOT): 77 (12-17-24 @ 00:40)  Bilirubin Total: 16.9 (12-18)  Bilirubin Total: 18.6 (12-18)  Bilirubin Total: 17.3 (12-17)  Bilirubin Total: 17.3 (12-17)  Bilirubin Total: 16.7 (12-17)      Trend LDH  12-10-24 @ 00:29  231  12-09-24 @ 00:32  241  12-08-24 @ 18:30  288[H]  12-08-24 @ 12:13  375[H]      Urinalysis Basic - ( 18 Dec 2024 22:15 )    Color: x / Appearance: x / SG: x / pH: x  Gluc: 237 mg/dL / Ketone: x  / Bili: x / Urobili: x   Blood: x / Protein: x / Nitrite: x   Leuk Esterase: x / RBC: x / WBC x   Sq Epi: x / Non Sq Epi: x / Bacteria: x        MICROBIOLOGY:    MRSA PCR Result.: Carlos Manuel (11-24-24 @ 10:14)      Culture - Urine (collected 16 Dec 2024 13:10)  Source: Catheterized Catheterized  Final Report:    10,000 - 49,000 CFU/mL Enterococcus faecalis  Organism: Enterococcus faecalis  Organism: Enterococcus faecalis    Sensitivities:      Method Type: ESAU      -  Ampicillin: S <=2 Predicts results to ampicillin/sulbactam, amoxacillin-clavulanate and  piperacillin-tazobactam.      -  Ciprofloxacin: S <=1      -  Levofloxacin: S <=1      -  Nitrofurantoin: S <=32 Should not be used to treat pyelonephritis.      -  Tetracycline: R >8      -  Vancomycin: S 1    Culture - Blood (collected 16 Dec 2024 13:00)  Source: .Blood BLOOD  Preliminary Report:    No growth at 48 Hours    Culture - Bronchial (collected 16 Dec 2024 08:36)  Source: Combi-Cath  Final Report:    Commensal charity consistent with body site    Culture - Blood (collected 16 Dec 2024 07:45)  Source: .Blood BLOOD  Preliminary Report:    No growth at 48 Hours    Culture - Bronchial (collected 14 Dec 2024 03:17)  Source: Combi-Cath  Final Report:    Commensal charity consistent with body site    Culture - Fungal, Other (collected 12 Dec 2024 18:00)  Source: .Other  Preliminary Report:    Culture is being performed. Fungal cultures are held for 4 weeks.    Culture - Blood (collected 07 Dec 2024 15:30)  Source: .Blood BLOOD  Final Report:    No growth at 5 days    Culture - Body Fluid with Gram Stain (collected 07 Dec 2024 11:18)  Source: Body Fluid  Final Report:    No growth at 5 days    Culture - Blood (collected 06 Dec 2024 13:30)  Source: .Blood BLOOD  Final Report:    Growth in aerobic and anaerobic bottles: Escherichia coli    See previous culture [id number]    Culture - Blood (collected 06 Dec 2024 13:25)  Source: .Blood BLOOD  Final Report:    Growth in aerobic and anaerobic bottles: Escherichia coli    Direct identification is available within approximately 3-5    hours either by Blood Panel Multiplexed PCR or Direct    MALDI-TOF. Details: [email or web address]  Organism: Blood Culture PCR  Escherichia coli  Organism: Escherichia coli    Sensitivities:      Method Type: ESAU      -  Ampicillin: R >16 These ampicillin results predict results for amoxicillin      -  Ampicillin/Sulbactam: R >16/8      -  Aztreonam: I 8      -  Cefazolin: R >16      -  Cefepime: S <=2      -  Cefoxitin: R >16      -  Ceftriaxone: S <=1      -  Ciprofloxacin: S <=0.25      -  Ertapenem: S <=0.5      -  Gentamicin: S <=2      -  Imipenem: S <=1      -  Levofloxacin: S <=0.5      -  Meropenem: S <=1      -  Piperacillin/Tazobactam: SDD 16      -  Tobramycin: S <=2      -  Trimethoprim/Sulfamethoxazole: R >2/38  Organism: Blood Culture PCR    Sensitivities:      Method Type: PCR      -  Escherichia coli: Detec      HIV-1/2 Combo Result: Nonreact (11-15-24 @ 00:41)        Cytomegalovirus By PCR: NotDetec IU/mL (12-16-24 @ 00:48)              Fungitell: 188 pg/mL (12-14 @ 02:15)    COVID-19 PCR: NotDetec (11-15-24 @ 01:34)                    Blood Gas Venous - Lactate: 2.3 (12-18 @ 22:10)  Blood Gas Venous - Lactate: 2.1 (12-18 @ 12:04)  Blood Gas Venous - Lactate: 2.7 (12-18 @ 06:30)  Blood Gas Venous - Lactate: 3.1 (12-18 @ 04:52)      RADIOLOGY:  imaging below personally reviewed

## 2024-12-19 NOTE — PROGRESS NOTE ADULT - SUBJECTIVE AND OBJECTIVE BOX
HPI:  Patient seen and examined at bedside in SICU.  No cardiac events overnight.    Review Of Systems:           Respiratory: No shortness of breath, cough, or wheezing  Cardiovascular: No chest pain or palpitations  10 point review of systems is otherwise negative except as mentioned above        Medications:  albumin human  5% IVPB 500 milliLiter(s) IV Intermittent once  albuterol/ipratropium for Nebulization 3 milliLiter(s) Nebulizer every 6 hours  aspirin  chewable 81 milliGRAM(s) Oral daily  buDESOnide    Inhalation Suspension 0.5 milliGRAM(s) Inhalation every 12 hours  caspofungin IVPB      caspofungin IVPB 50 milliGRAM(s) IV Intermittent every 24 hours  chlorhexidine 0.12% Liquid 15 milliLiter(s) Oral Mucosa every 12 hours  chlorhexidine 2% Cloths 1 Application(s) Topical <User Schedule>  heparin  Infusion 1000 Unit(s)/Hr IV Continuous <Continuous>  insulin lispro (ADMELOG) corrective regimen sliding scale   SubCutaneous every 6 hours  insulin NPH human recombinant 15 Unit(s) SubCutaneous every 6 hours  linezolid  IVPB 600 milliGRAM(s) IV Intermittent once  linezolid  IVPB 600 milliGRAM(s) IV Intermittent every 12 hours  linezolid  IVPB      meropenem  IVPB 1000 milliGRAM(s) IV Intermittent every 12 hours  methylPREDNISolone sodium succinate Injectable 32 milliGRAM(s) IV Push <User Schedule>  metoprolol tartrate 12.5 milliGRAM(s) Enteral Tube every 8 hours  nystatin Powder 1 Application(s) Topical every 12 hours  oxyCODONE    Solution 2.5 milliGRAM(s) Oral once  pantoprazole  Injectable 40 milliGRAM(s) IV Push daily    PAST MEDICAL & SURGICAL HISTORY:  Diabetes      Transaminitis      Paroxysmal atrial fibrillation      Depression      BPH (benign prostatic hyperplasia)      Hypertension      Chronic atrial fibrillation      Coronary artery disease      Hepatocellular carcinoma      DM (diabetes mellitus)      HTN (hypertension)      Paroxysmal atrial fibrillation      Cirrhosis      HCC (hepatocellular carcinoma)      History of BPH      History of laparoscopic cholecystectomy      History of lumbar laminectomy      H/O prior ablation treatment      H/O percutaneous left heart catheterization        Vitals:  T(C): 37 (12-20-24 @ 11:00), Max: 38.1 (12-19-24 @ 15:00)  HR: 83 (12-20-24 @ 11:07) (79 - 121)  BP: 126/73 (12-20-24 @ 11:00) (96/53 - 140/64)  BP(mean): 92 (12-20-24 @ 11:00) (70 - 92)  RR: 14 (12-20-24 @ 11:00) (14 - 31)  SpO2: 99% (12-20-24 @ 11:07) (97% - 100%)  Wt(kg): --  Daily     Daily   I&O's Summary    19 Dec 2024 07:01  -  20 Dec 2024 07:00  --------------------------------------------------------  IN: 4058.7 mL / OUT: 3365 mL / NET: 693.7 mL    20 Dec 2024 07:01  -  20 Dec 2024 11:59  --------------------------------------------------------  IN: 258.5 mL / OUT: 175 mL / NET: 83.5 mL        Physical Exam:  Appearance: Ventilated via trach, encephalopathic  Eyes: PERRL, EOMI, pink conjunctiva  HENT: +trach, +NGT  Cardiovascular: RRR, S1, S2, no murmurs, rubs, or gallops; no edema; no JVD  Respiratory: Clear to auscultation bilaterally  Gastrointestinal: soft, non-tender, non-distended with normal bowel sounds  Musculoskeletal: No clubbing; no joint deformity   Skin: Sacral wound                          7.7    7.70  )-----------( 44       ( 20 Dec 2024 09:05 )             22.6     12-20    138  |  103  |  123[H]  ----------------------------<  201[H]  4.4   |  15[L]  |  3.01[H]    Ca    7.7[L]      20 Dec 2024 09:05  Phos  6.0     12-20  Mg     2.6     12-20    TPro  4.0[L]  /  Alb  3.1[L]  /  TBili  10.5[H]  /  DBili  x   /  AST  52[H]  /  ALT  57[H]  /  AlkPhos  130[H]  12-20    PT/INR - ( 19 Dec 2024 21:02 )   PT: 18.0 sec;   INR: 1.57 ratio         PTT - ( 20 Dec 2024 09:05 )  PTT:139.4 sec  CARDIAC MARKERS ( 20 Dec 2024 09:05 )  x     / x     / x     / x     / 7.5 ng/mL  CARDIAC MARKERS ( 20 Dec 2024 06:24 )  x     / x     / x     / x     / 7.6 ng/mL  CARDIAC MARKERS ( 20 Dec 2024 03:03 )  x     / x     / x     / x     / 6.3 ng/mL  CARDIAC MARKERS ( 20 Dec 2024 00:25 )  x     / x     / x     / x     / 6.6 ng/mL  CARDIAC MARKERS ( 19 Dec 2024 21:04 )  x     / x     / x     / x     / 6.4 ng/mL      Cardiovascular Diagnostic Testing:  ECG: sinus rhythm    Echo: < from: Intra-Operative Transesophageal Echo W or WO Ultrasound Enhancing Agent (11.15.24 @ 07:46) >  --------------------------------------------------------------------------------  PRE-BYPASS FINDINGS     Left Ventricle:  Left ventricular ejection fraction is estimated at 60 to 65%. Normal left ventricularwall thickness. The left ventricular systolic function is normal There are no regional wall motion abnormalities seen.     Right Ventricle:  The right ventricular cavity is normal in size, with normal wall thickness and right ventricular systolic function is normal. Right ventricular systolic function is normal.     Left Atrium:  The left atrium is normal in size. No thrombus visualized in left atrial appendage.     Right Atrium:  The right atrium is normal in size. The right atrium is normal in size.     Interatrial Septum:  No PFO visualized with color flow doppler.     Aortic Valve:  The aortic valve appears trileaflet. There is no aortic valve stenosis. There is trace aortic regurgitation.     Mitral Valve:  There is no mitral valve stenosis. There is no mitral valve stenosis. There is trace mitral regurgitation.     Tricuspid Valve:  There is no evidence of tricuspid stenosis. There is trace tricuspid regurgitation.     Pericardium:  No pericardial effusion seen.     Post-Bypass:  S/P OLT. Under current loading conditions, hyperdynamic left ventricular systolic function, EF: 70-75% by visual estimate, no RWMA. Normal right ventricular systolic function. All other findings unchanged. Probe removed atraumatically, no blood. The patient tolerated the procedure well and without complications. Permanent recorded images are stored in the medical record.     Electronically signed by James Villareal on 11/15/2024 at 2:18:16 PM         *** Final ***    < end of copied text >      < from: TTE Limited W or WO Ultrasound Enhancing Agent (12.11.24 @ 09:28) >  _______________________________________________________________________________________     CONCLUSIONS:      1. Endocardial visualization enhanced with Definity (Ultrasound Enhancing Agent).   2. Left ventricular systolic function is normal with an ejection fraction visually estimated at 55 to 60 %.   3. Enlarged right ventricular cavity size and mildly reduced right ventricular systolic function.   4. Device lead is visualized in the right heart.    ________________________________________________________________________________________    < end of copied text >      Stress Testing:     Cath: 4/24/2024, patient had his LHC repeated with Ben Villareal MD at [facility].  Cath showed multivessel coronary artery disease, but the lesions previously noted were FFR negative.  He continues to have MIRTA 3 flow throughout.    Interpretation of Telemetry: Sinus     Imaging:        ECG:    Echo:    Stress Testing:     Cath:    Imaging:    Interpretation of Telemetry:

## 2024-12-19 NOTE — PROGRESS NOTE ADULT - ASSESSMENT
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 Post op course complicated by acute hypoxic respiratory failure requiring intubation multiple times, most recently on 12/7, e.coli bacteremia with RTOR on 12/7 for concern for worsening septic shock and cardiogenic shock s/p balloon pump placement and total abdominal colectomy in setting of ischemic bowel s/p OR on 12/9 for ileostomy creation, and olirugirc MORAIMA requiring CRRT       Abx:   Caspofungin ( 12/6-12/11, 12/17-)  Meropenem ( 11/22-11/29, 11/22-11/29, 12/16-)   Fluconazole (11/16-12/2, 12/12-12/16)   Cefepime (12/10-12/16)   Metronidazole (12/10-12/14)   Ganciclovir (12/7-12/13)   Linezolid (11/23-11/26, 12/6-12/11)   Bactrim (11/16-11/24, 12/8-12/11)   Atovaquone (11/29-12/6)   Zosyn (11/20-11/22,12/6)   Tobramycin (12/6)   Valganciclovir (11/6-12/4)    # Ileus/ischemic bowel s/p colectomy on 12/16 and ileostomy creation on 12/9   # severe septic shock 12/6 and pancytopenia, lactic acidosis  # Ecoli bacteremia on 12/6 blood cultures  in context of above (repeat blood cultures since cleared)   # cardiogenic shock s/p impella now removed   # acute hypoxic respiratory failure with b/l pleural effusions (CT chest on 12/15)  #mild intrahepatic biliary ductal dilatation andSeptated collection within the gallbladder fossa (seen on 12/14 US liver)  #Testicular Erythema/Edema (no fluid collection on 12/15 testicular US)   --Continue Meropenem  --Continue Caspofungin  --Continue to follow CBC with diff  --Continue to follow temperature curve  --Follow up on preliminary blood cultures  --> follow combicath cultures 12/14 are negative    -Collection with septation within the gallbladder fossa, measuring 2.7 x 1.6 x 1.5 cm.  likely too small for intervention but will need close monitoring with imaging repeats    #Testicular Erythema/Edema  Repeat Testicular US (12/12) 2.1 x0.9 x 3.2 cm focal, complex fluid collection within the soft tissues inferior to the right testicle, skin thickening c/w cellulitis  -no drainable collections seen  HSV/VZV PCR per dermatology negative  Dermatology fungal culture with no growth      #s/p OLT 11/15/24 CMV D?R+  EBV , toxo D?/R?  --Bactrim on hold given thrombocytopenia, unlikely to absorb atovaquone  --off ganciclovir now, If no thrombocytopenia recovery (with other adjustments) would need to switch to IV Letermovir  - send CMV PCR     #Fever, sepsis, hypoxic respiratory failure s/p on mechanical ventilation  CT C/A/P Bilateral lower lobe consolidation with fluid and debris in the right lower lobe bronchi, concerning for aspiration pneumonia.  CMV PCR (11/24) 146 (low level CMV viremia - not likely contributing)  Adenovirus PCR (11/24) Negative  Cryptococcal Serum Ag (11/24) Negative  serum and bronch aspergillus galactomannan negative  WNV Serology and Serum PCR Negative  CT Chest (11/25) Groundglass opacities in the right lower and left upper lobes, possibly infectious in nature.  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 Post op course complicated by acute hypoxic respiratory failure requiring intubation multiple times, most recently on 12/7 with conversion to tracheostomy on 12/17, e.coli bacteremia with RTOR on 12/7 for concern for worsening septic shock and cardiogenic shock s/p balloon pump placement and total abdominal colectomy in setting of ischemic bowel s/p OR on 12/9 for ileostomy creation, and olirugirc MORAIMA requiring CRRT and now intermittent HD.       Abx:   Caspofungin ( 12/6-12/11, 12/17-)  Meropenem ( 11/22-11/29, 11/22-11/29, 12/16-)   Fluconazole (11/16-12/2, 12/12-12/16)   Cefepime (12/10-12/16)   Metronidazole (12/10-12/14)   Ganciclovir (12/7-12/13)   Linezolid (11/23-11/26, 12/6-12/11)   Bactrim (11/16-11/24, 12/8-12/11)   Atovaquone (11/29-12/6)   Zosyn (11/20-11/22,12/6)   Tobramycin (12/6)   Valganciclovir (11/6-12/4)    # Ileus/ischemic bowel s/p colectomy on 12/16 and ileostomy creation on 12/9   # severe septic shock 12/6 and pancytopenia, lactic acidosis  # Ecoli bacteremia on 12/6 blood cultures  in context of above (repeat blood cultures since cleared)   # cardiogenic shock s/p impella now removed   # acute hypoxic respiratory failure with b/l pleural effusions (CT chest on 12/15)  #mild intrahepatic biliary ductal dilatation and Septated collection within the gallbladder fossa (seen on 12/14 US liver)  #Testicular Erythema/Edema (no fluid collection on 12/15 testicular US; HSV/VZV PCR of lesion negative)  #pyuria with recent urine culture on 12/16 with e. fecalis ( 10,000-49,000 e. fecalis)   #s/p OLT CMV D?R+  EBV , toxo D?/R?  --Continue Meropenem  --Continue Caspofungin  --start Linezolid??  --obtain repeat imaging??  --Continue to follow CBC with diff  --Continue to follow temperature curve  --F/u repeat blood cultures   --send tracheal aspiration for culture   --Bactrim on hold given thrombocytopenia, unlikely to absorb atovaquone  --off ganciclovir now, If no thrombocytopenia recovery (with other adjustments) would need to switch to IV Letermovir?  --cmv pcr negative on 12/16   --c.diff pcr negative on 12/1   --Adenovirus PCR (11/24) Negative  --Cryptococcal Serum Ag (11/24) Negative  --serum and bronch aspergillus galactomannan negative  --WNV Serology and Serum PCR Negative on 11/24   --lyme serology negative on 11/21/24   --hep b and c pcr negative on 11/15   --toxoplasma IgG positive on 11/14   ---PJP pcr from sputum sample negative on 12/8/24   --HIV non reactive on 11/15/24   --babesia PCR negative on 11/25/24        Patient is a 74 year old male with PMH of DM, HTN, pAfib s/p ablation 2018 (no AC 2/2 thrombocytopenia), CAD, depression, anxiety, BPH, 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 Post op course complicated by acute hypoxic respiratory failure requiring intubation multiple times, most recently on 12/7 with conversion to tracheostomy on 12/17, e.coli bacteremia with RTOR on 12/7 for concern for worsening septic shock and cardiogenic shock s/p balloon pump placement and total abdominal colectomy in setting of ischemic bowel s/p OR on 12/9 for ileostomy creation, and olirugirc MORAIMA requiring CRRT and now intermittent HD. ID called back for fever spike this morning.       Abx:   Caspofungin ( 12/6-12/11, 12/17-)  Meropenem ( 11/22-11/29, 11/22-11/29, 12/16-)   Fluconazole (11/16-12/2, 12/12-12/16)   Cefepime (12/10-12/16)   Metronidazole (12/10-12/14)   Ganciclovir (12/7-12/13)   Linezolid (11/23-11/26, 12/6-12/11)   Bactrim (11/16-11/24, 12/8-12/11)   Atovaquone (11/29-12/6)   Zosyn (11/20-11/22,12/6)   Tobramycin (12/6)   Valganciclovir (11/6-12/4)    # Ileus/ischemic bowel s/p colectomy on 12/16 and ileostomy creation on 12/9   # severe septic shock 12/6 and pancytopenia, lactic acidosis  # Ecoli bacteremia on 12/6 blood cultures  in context of above (repeat blood cultures since cleared)   # cardiogenic shock s/p impella now removed   # acute hypoxic respiratory failure with b/l pleural effusions (CT chest on 12/15)  #mild intrahepatic biliary ductal dilatation and Septated collection within the gallbladder fossa (seen on 12/14 US liver)  #Testicular Erythema/Edema (no fluid collection on 12/15 testicular US; HSV/VZV PCR of lesion negative)  #pyuria with recent urine culture on 12/16 with e. fecalis ( 10,000-49,000 e. fecalis)   #s/p OLT CMV D?R+  EBV , toxo D?/R?  --Continue Meropenem  --Continue Caspofungin  --start Linezolid 600 mg IVPB q12h   --obtain repeat imaging-CT chest abdomen pelvis- to assess for new foci of infection   --obtain full RVP   --send tracheal aspirate for culture   --Continue to follow CBC with diff  --Continue to follow temperature curve  --F/u repeat blood cultures   --Bactrim on hold given thrombocytopenia. Recommend starting atovaquone ppx in light of thrombocytopenia (toxoplasma IgG positive)  --off ganciclovir now in setting of thrombocytopenia. If plan to continue off, would consider initiation of letermovir  and acyclovir as ppx   --cmv pcr negative on 12/16   --c.diff pcr negative on 12/1   --Adenovirus PCR (11/24) Negative  --Cryptococcal Serum Ag (11/24) Negative  --serum and bronch aspergillus galactomannan negative  --WNV Serology and Serum PCR Negative on 11/24   --lyme serology negative on 11/21/24   --hep b and c pcr negative on 11/15   --toxoplasma IgG positive on 11/14   ---PJP pcr from sputum sample negative on 12/8/24   --HIV non reactive on 11/15/24   --babesia PCR negative on 11/25/24     Case seen and discussed with Dr. Carrero who agrees with assessment and plan. Note not final until attending addendum.

## 2024-12-19 NOTE — PROGRESS NOTE ADULT - ASSESSMENT
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   s/p OLT 11/15 with complicated post op course    [] Septic shock/Cardiogenic shock  [] s/p Colectomy 12/7 POD# 12  [] RTOR for closure and Ileostomy creation   [] IAPB 12/7- removed 12/10: CTICU on board  - E coli Bacteremia (12/6) - on jeniffer/caspo -> switched to  cefepime/fluc. Completed 7days of emperic flagyl.  Received Tobra x 1 12/6 .   - c/w cefepime due to leukocytosis.  - Neutropenia: received Neupogen 480mcg x2  - MORAIMA: CRRT started 12/7, stopped 12/15  - AMS; CT Head neg  - scrotal edema: repeat scrotal us; neg for abscess  - Hold diuretics   - f/u urine Cx  - BCx (12/16): no growth to date and UA (12/16) +nitrite and leukocyte esterase   - On meropenem and caspofungin as per ID  - Vitamin K x 3 days 12/16-12/19  - S/P tracheotomy 12/17    [] s/p OLT POD #34  - trend LFT's  - Diet: increase TFs as needed  - Pain management: avoid narcotics  - Strict I&Os, nichols, wound vac placed 12/10   - US: L brachial DVT   - wound vac exchange for ileostomy (12/13)  - HIT panel neg (12/14)    [ ] Immunosuppression  - Tacrolimus stopped 11/18 in setting of AMS/Seizure, Started Cyclo 12/17  - Cyclo 25BID, MMF held, Solumedrol 16mg daily  - PPx: Gancyclovir (HELD), bactrim (HELD) in setting of thrombocytopenia    [] lleus   -@ home on Linzess  - imaging with distended colon (likely opioid induced)  - s/p Neostigmine x 2  - s/p Relistor, last dose 12/1  - s/p colectomy with end ileostomy  (see above)  - Daily AXR     [] CMV viremia  - d/c gancylovir due to thrombocytopenia  - CMV PCR (12/11 and 12/16): neg    [ ] AMS  - Reintubated 11/20 Aspiration/hypoxia, extubated 11/24->[id number]/24--> extubated 11/26 -> [id number]/7 -> tracheostomy 12/17  - EEG 11/30 negative, Neurology following  - BH following   - off tacro  - on keppra    [ ] HTN/ pAFib  - Off Amiodarone, off dobutamine  - metoprolol held (hypotensive)   - Eliquis 5mg bid - held 12/6.   - Dr. 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   s/p OLT 11/15 with complicated post op course    [] Septic shock/Cardiogenic shock  [] s/p Colectomy 12/7 POD# 12  [] RTOR for closure and Ileostomy creation   [] IAPB 12/7- removed 12/10: CTICU on board  - E coli Bacteremia (12/6) - on jeniffer/caspo -> switched to  cefepime/fluc. Completed 7days of emperic flagyl.  Received Tobra x 1 12/6 .   - c/w cefepime due to leukocytosis.  - Neutropenia: received Neupogen 480mcg x2  - MORAIMA: CRRT started 12/7, stopped 12/15  - AMS; CT Head neg  - scrotal edema: repeat scrotal us; neg for abscess  - Hold diuretics   - f/u urine Cx  - BCx (12/16): no growth to date and UA (12/16) +nitrite and leukocyte esterase   - On meropenem and caspofungin as per ID  - Vitamin K x 3 days 12/16-12/19  - S/P tracheotomy 12/17    [] s/p OLT POD #34  - trend LFT's  - Diet: increase TFs as needed  - Pain management: avoid narcotics  - Strict I&Os, nichols, wound vac placed 12/10   - US: L brachial DVT   - wound vac exchange for ileostomy (12/13)  - HIT panel neg (12/14)  - 12/19 febrile to 100.6, recultured     [ ] Immunosuppression  - Tacrolimus stopped 11/18 in setting of AMS/Seizure, Started Cyclo 12/17  - Cyclo by level, MMF held, Solumedrol 32 mg daily  - PPx: Gancyclovir (HELD), bactrim (HELD) in setting of thrombocytopenia    [] lleus   -@ home on Linzess  - imaging with distended colon (likely opioid induced)  - s/p Neostigmine x 2  - s/p Relistor, last dose 12/1  - s/p colectomy with end ileostomy  (see above)  - Daily AXR     [] CMV viremia  - d/c gancylovir due to thrombocytopenia  - CMV PCR (12/11 and 12/16): neg    [ ] AMS  - Reintubated 11/20 Aspiration/hypoxia, extubated 11/24->[id number]/24--> extubated 11/26 -> [id number]/7 -> tracheostomy 12/17  - EEG 11/30 negative, Neurology following  - BH following   - off tacro  - on keppra    [ ] HTN/ pAFib  - Off Amiodarone, off dobutamine  - metoprolol held (hypotensive)   - Eliquis 5mg bid - held 12/6.   - Dr. Quintanilla following    [ ] DM  - ISS     []Scrotal abscess   - US (12/12): 2.1 x0.9 x 3.2 cm focal, right testicle- possible abscess (12/12)  - Urology c/s recs nothing to do, repeat ultrasound   - 12/15 CT CAP no acute changes

## 2024-12-19 NOTE — PROGRESS NOTE ADULT - ASSESSMENT
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 Post-op course c/b worsening mental status and acute hypoxic respiratory failure requiring multiple intubations/extubations, currently extubated (as of 11/26/24) and colonic ileus s/p several rounds of Relistor and Neostigmine with NGT and rectal tube currently in place now with septic shock of unclear etiology. Transplant nephrology consulted for metabolic acidosis and MORAIMA.    1. s/p OLT 11/15/24 with oliguric MORAIMA in setting of septic shock.  - Likely hemodynamically mediated in setting of cardiogenic and septic shock on multiple vasopressors and IABP.   - SCr on admission was 0.48 11/15/24. SCr elevated at 2.59. Pt. is non-oliguric, UOP today is 635L in 24 hours  - Urinalysis trace ketones, protein 30, 17 casts.   - CT AP non-con: Bilateral renal cysts including cysts with peripheral calcification in the left kidney.  - Pt. initiated on CRRT 12/7/24- 12/15/24 at 1AM stopped. s/p iHD 12/18/24 however required levophed.   - Pt. will likely require CRRT in near future as UOP is decreasing, however non-tunneled catheter removed and pt. is to be given line holiday in setting of fever. Will hold off on CRRT for now. Discussed with SICU team.  - Electrolyte, labs, and volume status reviewed. Worsening uremia, will dialyze iHD today.      2. Metabolic Acidosis   - AGMA in setting of septic shock, starvation ketosis, lactic acidosis and MORAIMA.  - SCO2 worsening to 16 today.  - Start sodium bicarbonate tabs 1300mg TID.     If you have any questions, please feel free to contact me:  Magaly Tello MD PGY-4  Nephrology Fellow  [employer] Teams (Preferred)/ Pager [phone]   (After 5pm or on weekends please page the on-call fellow

## 2024-12-19 NOTE — PROGRESS NOTE ADULT - CRITICAL CARE ATTENDING COMMENT
NEURO: Intermittentyl follows.   -CTH, EEG negative.   RESP: s/p Trach 12/17. 14/500/5/30%. Tolerated 2 hours yesterday.   CVS: Decrease metoprolol 25 Q8. Levo 0.02.  GI: NPO with tube feeds. Post pyloric [place]. Ostomy 475. 0.5 : 1 repletions > 1L with albumin.   : Tolerated HD yesterday. Net positive positive 41cc.   HEME: Hb 7.6 ---> 7.6. Plt 60. SCDs for DVT ppx. Start HSQ. Stable thrombocytopenea.   ID: Tm 100.6. , 12 --> 15. Meropenem/Caspo on 16th.   -F/up cx.   -DC L IJ Intro.   -LAURENT Fine   ENDO: Stress dose steroids.    Matthew 12/16 Patient evaluated by me on AM rounds.     Current management as follows.   #NEURO: Intermittently follows commands.   ---CTH, EEG negative.   #RESP: s/p Trach 12/17. Continues to require ventilator support. Tolerated 2 hours yesterday.   #CVS: Remains hypotensive requiring low dose Levo. Decrease metoprolol to 25 Q8.   #GI: Tolerating post pyloric tube feeds.   ---Ostomy output 475cc. Replace output 0.5 : 1 with albumin if more than 1L.  #: Tolerated HD yesterday. Net positive 41cc/24 hours.   #HEME: Stable thrombocytopenia and anemia. Start HSQ for DVT ppx.   #ID: Tm 100.6. WBC 12 --> 15. Positive urine cx.   ---Appreciate ID recs. Continue Meropenem & Caspo.   ---DC L IJ Intro after obtaining peripherals. LAURENT Fine for at least 24 hour line holiday.   #ENDO: Stress dose steroids.    Lines:   -Castro 12/16

## 2024-12-19 NOTE — PROGRESS NOTE ADULT - SUBJECTIVE AND OBJECTIVE BOX
[facility] DIVISION OF KIDNEY DISEASES AND HYPERTENSION -- FOLLOW UP NOTE  --------------------------------------------------------------------------------  Chief Complaint: s/p OLT 11/15/24 with oliguric MORAIMA in setting of cardiogenic/septic shock.    24 hour events/subjective: Pt. seen and examined at bedside earlier today. Remains intubated. Received 2 hrs HD yesterday and required levophed during treatment. Pt also febrile overnight and this morning.       PAST HISTORY  --------------------------------------------------------------------------------  No significant changes to PMH, PSH, FHx, SHx, unless otherwise noted    ALLERGIES & MEDICATIONS  --------------------------------------------------------------------------------  Allergies    No Known Allergies    Intolerances      Standing Inpatient Medications  albumin human  5% IVPB 500 milliLiter(s) IV Intermittent once  albuterol/ipratropium for Nebulization 3 milliLiter(s) Nebulizer every 6 hours  buDESOnide    Inhalation Suspension 0.5 milliGRAM(s) Inhalation every 12 hours  caspofungin IVPB 50 milliGRAM(s) IV Intermittent every 24 hours  caspofungin IVPB      chlorhexidine 0.12% Liquid 15 milliLiter(s) Oral Mucosa every 12 hours  chlorhexidine 2% Cloths 1 Application(s) Topical <User Schedule>  cycloSPORINE  , modified (GENGRAF) Solution 25 milliGRAM(s) Oral <User Schedule>  heparin   Injectable 5000 Unit(s) SubCutaneous every 12 hours  insulin lispro (ADMELOG) corrective regimen sliding scale   SubCutaneous every 6 hours  insulin NPH human recombinant 12 Unit(s) SubCutaneous every 6 hours  meropenem  IVPB 1000 milliGRAM(s) IV Intermittent every 12 hours  methylPREDNISolone sodium succinate Injectable 32 milliGRAM(s) IV Push <User Schedule>  norepinephrine Infusion 0.02 MICROgram(s)/kG/Min IV Continuous <Continuous>  nystatin Powder 1 Application(s) Topical every 12 hours  pantoprazole  Injectable 40 milliGRAM(s) IV Push daily    PRN Inpatient Medications      REVIEW OF SYSTEMS  --------------------------------------------------------------------------------    All other systems were reviewed and are negative, except as noted.    VITALS/PHYSICAL EXAM  --------------------------------------------------------------------------------  T(C): 38.5 (12-19-24 @ 11:00), Max: 38.5 (12-19-24 @ 11:00)  HR: 97 (12-19-24 @ 13:00) (82 - 101)  BP: 96/53 (12-19-24 @ 13:00) (78/43 - 158/67)  RR: 29 (12-19-24 @ 13:00) (24 - 38)  SpO2: 100% (12-19-24 @ 13:00) (96% - 100%)  Wt(kg): --        12-18-24 @ 07:01  -  12-19-24 @ 07:00  --------------------------------------------------------  IN: 2906.3 mL / OUT: 2865 mL / NET: 41.3 mL    12-19-24 @ 07:01  -  12-19-24 @ 14:34  --------------------------------------------------------  IN: 1632.7 mL / OUT: 880 mL / NET: 752.7 mL      Physical Exam:  Gen: NAD, able to speak in full sentences   HEENT: PERRL, MMM   Pulm: CTA B/L, no crackles   CV: RRR, S1S2+  Abd: +BS, soft  Transplant: No tenderness, swelling  : No suprapubic tenderness  MSK: no edema   Psych: Normal affect and mood  Skin: Warm  Access: RIJ non-tunneled HD catheter.       LABS/STUDIES  --------------------------------------------------------------------------------              7.1    11.11 >-----------<  61       [12-19-24 @ 11:35]              20.8     138  |  102  |  101  ----------------------------<  148      [12-19-24 @ 11:35]  4.0   |  16  |  2.36        Ca     7.5     [12-19-24 @ 11:35]      Mg     2.0     [12-19-24 @ 11:35]      Phos  4.5     [12-19-24 @ 11:35]    TPro  4.1  /  Alb  3.4  /  TBili  14.6  /  DBili  x   /  AST  67  /  ALT  61  /  AlkPhos  180  [12-19-24 @ 11:35]    PT/INR: PT 18.0 , INR 1.59       [12-19-24 @ 06:10]  PTT: 47.9       [12-19-24 @ 06:10]      Creatinine Trend:  SCr 2.36 [12-19 @ 11:35]  SCr 2.19 [12-18 @ 22:15]  SCr 2.59 [12-18 @ 12:10]  SCr 2.40 [12-17 @ 23:39]  SCr 2.45 [12-17 @ 16:21]                Vitamin D (25OH) <6.0      [11-21-24 @ 08:32]  TSH 0.68      [12-12-24 @ 12:27]  Lipid: chol 114, , HDL 47, LDL --      [04-24-24 @ 06:48]

## 2024-12-19 NOTE — PROGRESS NOTE ADULT - ATTENDING COMMENTS
Patient seen and examined    Case discussed with dR Beltran    I agree with her interval history exam and plans as noted above    Patient with trach and some secretions from trach site  febrile today  abdominal drains with some purulence  wound vac over OLTx site appears CDI  buttocks with some stage 2 skin breakdown    send deep suction RVP from Trach site  send blood cultures x 2 sets  send sputum for gram stain cx  obtain CT of chest and abdomen  nasal MRSA swab  add linezolid 600mg empirically    discussed with SICU team    Wes Carrero MD  Can be called via Teams  After 5pm/weekends [phone]

## 2024-12-19 NOTE — PROGRESS NOTE ADULT - SUBJECTIVE AND OBJECTIVE BOX
24 HOUR EVENTS:  - SBT for 2 hrs  - inc NPH 8u q6    NEURO  RASS (if intubated): 		[facility] ICU (if concern for delirium):  Exam: AOx0, off sedation  Meds:     RESPIRATORY  RR: 30 (12-19-24 @ 00:00) (21 - 41)  SpO2: 96% (12-19-24 @ 00:55) (95% - 100%)  Wt(kg): --  Exam: Lungs CTA b/l  Mechanical Ventilation: Mode: AC/ CMV (Assist Control/ Continuous Mandatory Ventilation), RR (machine): 14, RR (patient): 22, TV (machine): 500, FiO2: 30, PEEP: 5, ITime: 0.9, MAP: 10, PIP: 20    Meds: albuterol/ipratropium for Nebulization 3 milliLiter(s) Nebulizer every 6 hours  buDESOnide    Inhalation Suspension 0.5 milliGRAM(s) Inhalation every 12 hours      CARDIOVASCULAR  HR: 94 (12-19-24 @ 00:55) (75 - 101)  BP: 109/56 (12-19-24 @ 00:00) (84/48 - 158/67)  BP(mean): 78 (12-19-24 @ 00:00) (60 - 112)  ABP: --  ABP(mean): --  Wt(kg): --  CVP(cm H2O): --  VBG - ( 18 Dec 2024 22:10 )  pH: 7.41  /  pCO2: 32    /  pO2: 48    / HCO3: 20    / Base Excess: -3.8  /  SaO2: 82.3   Lactate: 2.3                Exam: Normal S1/S2 w/o murmurs or rubs  Cardiac Rhythm: sinus  Perfusion     [x ]Adequate   [ ]Inadequate  Mentation   [ ]Normal       [x ]Reduced  Extremities  [x ]Warm         [ ]Cool  Volume Status [ ]Hypervolemic [x ]Euvolemic [ ]Hypovolemic  Meds: metoprolol tartrate 50 milliGRAM(s) Enteral Tube every 8 hours      GI/NUTRITION  Exam: abd non distended  Diet: tube feeds  Last Bowel Movement: 17-Dec-2024 (12-17-24 @ 19:00)  Last Bowel Movement: 16-Dec-2024 (12-16-24 @ 19:00)  Last Bowel Movement: 16-Dec-2024 (12-16-24 @ 07:00)  Last Bowel Movement: 15-Dec-2024 (12-15-24 @ 19:00)  Last Bowel Movement: 13-Dec-2024 (12-13-24 @ 12:00)  Last Bowel Movement: 12-Dec-2024 (12-12-24 @ 20:00)      Meds: pantoprazole  Injectable 40 milliGRAM(s) IV Push daily      GENITOURINARY  I&O's Detail    12-17 @ 07:01  -  12-18 @ 07:00  --------------------------------------------------------  IN:    Albumin 5%  - 250 mL: 250 mL    Albumin 5%  - 500 mL: 500 mL    Enteral Tube Flush: 90 mL    Insulin: 9.5 mL    IV PiggyBack: 650 mL    Nepro with Carb Steady: 770 mL    Plasma: 300 mL    Platelets - Single Donor: 225 mL  Total IN: 2794.5 mL    OUT:    Bulb (mL): 625 mL    Bulb (mL): 775 mL    Ileostomy (mL): 175 mL    Indwelling Catheter - Urethral (mL): 1920 mL    Norepinephrine: 0 mL    VAC (Vacuum Assisted Closure) System (mL): 0 mL  Total OUT: 3495 mL    Total NET: -700.5 mL      12-18 @ 07:01  -  12-19 @ 01:07  --------------------------------------------------------  IN:    Albumin 5%  - 500 mL: 500 mL    Enteral Tube Flush: 95 mL    IV PiggyBack: 460 mL    Nepro with Carb Steady: 880 mL    Norepinephrine: 6.3 mL  Total IN: 1941.3 mL    OUT:    Bulb (mL): 550 mL    Bulb (mL): 325 mL    Ileostomy (mL): 100 mL    Indwelling Catheter - Urethral (mL): 520 mL    Other (mL): 500 mL    VAC (Vacuum Assisted Closure) System (mL): 0 mL  Total OUT: 1995 mL    Total NET: -53.7 mL          12-18    140  |  105  |  103[H]  ----------------------------<  237[H]  3.9   |  19[L]  |  2.19[H]    Ca    7.5[L]      18 Dec 2024 22:15  Phos  4.7     12-18  Mg     2.2     12-18    TPro  3.8[L]  /  Alb  3.0[L]  /  TBili  16.9[H]  /  DBili  x   /  AST  82[H]  /  ALT  85[H]  /  AlkPhos  253[H]  12-18    Meds: albumin human  5% IVPB 500 milliLiter(s) IV Intermittent every 12 hours PRN see special instructions      HEMATOLOGIC  Meds:                         7.7    15.46 )-----------( 60       ( 18 Dec 2024 22:15 )             23.1     PT/INR - ( 18 Dec 2024 06:47 )   PT: 20.7 sec;   INR: 1.81 ratio         PTT - ( 18 Dec 2024 06:47 )  PTT:45.4 sec    INFECTIOUS DISEASES  T(C): 37.8 (12-18-24 @ 23:00), Max: 37.8 (12-18-24 @ 23:00)  Wt(kg): --  WBC Count: 15.46 K/uL (12-18 @ 22:15)  WBC Count: 12.35 K/uL (12-18 @ 06:47)    Recent Cultures:  Specimen Source: Catheterized Catheterized, 12-16 @ 13:10; Results   10,000 - 49,000 CFU/mL Enterococcus faecalis[!]; Gram Stain: --; Organism: --  Specimen Source: .Blood BLOOD, 12-16 @ 13:00; Results   No growth at 48 Hours; Gram Stain: --; Organism: --  Specimen Source: Combi-Cath, 12-16 @ 08:36; Results   Commensal charity consistent with body site; Gram Stain:   No polymorphonuclear leukocytes seen per low power field  No Squamous epithelial cells seen per low power field  No organisms seen per oil power field; Organism: --  Specimen Source: .Blood BLOOD, 12-16 @ 07:45; Results   No growth at 48 Hours; Gram Stain: --; Organism: --  Specimen Source: Combi-Cath, 12-14 @ 03:17; Results   Commensal charity consistent with body site; Gram Stain:   Numerous polymorphonuclear leukocytes per low power field  No Squamous epithelial cells per low power field  No organisms seen per oil power field; Organism: --  Specimen Source: .Other, 12-12 @ 18:00; Results   Culture is being performed. Fungal cultures are held for 4 weeks.; Gram Stain: --; Organism: --    Meds: caspofungin IVPB      caspofungin IVPB 50 milliGRAM(s) IV Intermittent every 24 hours  meropenem  IVPB 1000 milliGRAM(s) IV Intermittent every 12 hours      ENDOCRINE  Capillary Blood Glucose    Meds: insulin lispro (ADMELOG) corrective regimen sliding scale   SubCutaneous every 4 hours  insulin NPH human recombinant 12 Unit(s) SubCutaneous every 6 hours  methylPREDNISolone sodium succinate Injectable 16 milliGRAM(s) IV Push <User Schedule>      ACCESS DEVICES:  [ ] Peripheral IV  [ ] Central Venous Line		[ ] R	[ ] L	[ ] IJ	[ ] Fem	[ ] SC	Placed:   [ ] Arterial Line			[ ] R	[ ] L	[ ] Fem	[ ] Rad	[ ] Ax	Placed:   [ ] PICC:					[ ] Mediport  [ ] Urinary Catheter, Date Placed:   [ ] Necessity of urinary, arterial, and venous catheters discussed    OTHER MEDICATIONS:  chlorhexidine 0.12% Liquid 15 milliLiter(s) Oral Mucosa every 12 hours  chlorhexidine 2% Cloths 1 Application(s) Topical <User Schedule>  nystatin Powder 1 Application(s) Topical every 12 hours      IMAGING:

## 2024-12-19 NOTE — PROGRESS NOTE ADULT - ASSESSMENT
74 year-old with known coronary artery disease as above presents for liver transplant.  Seen to have chest pain post transplant.  It was atypical of angina and has resolved.  Troponin remained negative.    PAF - currently sinus rhythm     Patient was septic on Friday, 12/6, and at that time, he was seen to be hyperdynamic with TODD and severe LVOT gradient.  Post ex-lap, patient seen to have newly reduced LVEF.  IABP placed. Inotrope and pressor support. IABP removed 12/10 without significant drop in cardiac output.  Weaned off both inotrope and pressors. Now restarted on beta-blocker.  Keep MAP >65 mmHg. Monitor output via [place].    Heart Failure follow-up appreciated (they are not actively following at this time but could be reconsulted as needed).

## 2024-12-19 NOTE — PROGRESS NOTE ADULT - ASSESSMENT
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 Patient's post-op course has been c/b multiple reintubation, total colectomy w/ ileostomy, and IABP placement 2/2 cardiogenic shock with subsequent removal. S/p bedside tracheostomy 12/17.    Neuro:  - Follows commands, off sedation  - pain control w/ oxycodone  - CT head 11/19, 11/21, 11/25, 11/29, 12/5 negative  - EEG: Mild diffuse cerebral dysfunction that is not specific in etiology. No epileptic discharges recorded. No seizures recorded.  - Holding home xanax, Abilify, Zoloft, Remeron, Lexapro     Resp:  - S/p bedside tracheostomy 12/17  -PRVC 14/500/5/30  -SBT daily, today 2 hrs  -c/w inhaled bronchodilator  -VBG daily    CV:  -IABP removed 12/10  -dobutamine gtt d/c'ed 12/15  -s/p amiodarone gtt  - Metoprolol 50 q8hr  -trend lactate  -hold home metoprolol  -TTE 12/11 shows EF of 55-60%    GI:  -trend LFTs  -NPO w/ tube feeds (held for trach)  -KO tube post pyloric for feeds  -protonix QD  -high output from ALYSSA drains, continue to monitor quantity & quality    /Renal:  -off CRRT since 12/15  -Monitor BUN/Cr  -I&Os, trend UOP  -elevate scrotum i/s/o edema   -Scrotal US 12/15 -> edema, no abscess    Heme:  -trend H/H  -monitor coags    ID:  -ABx transition to jeniffer, caspo (12/16-)  -monitor WBC, fever curve  -CMV PPx w/ ganciclovir (holding now iso thrombocytopenia)  -holding cellcept, cyclosporine  -f/u cultures from 12/16  -UA positive 12/16    Endo:  -monitor glucose   -SDS finished  -NPH 8 q6    Lines:   -KO tube, remove NGT  -nichols exchanged 12/16  -RIJ (12/7)  -LIKATRINA (12/7)  -ALYSSA x 2     Les Wright PA-C  Surgical Intensive Care Unit  [id number]

## 2024-12-19 NOTE — PROGRESS NOTE ADULT - SUBJECTIVE AND OBJECTIVE BOX
Transplant Surgery - Multidisciplinary Rounds  --------------------------------------------------------------  OLT   11/15/2024        POD#34  Colectomy 12/7/2024   POD#12  IABP 12/7 removed 12/10  Tracheostomy 12/17/2024 POD#2    Present:   Patient seen and examined with multidisciplinary Transplant team including Surgeon: Dr. Vora, Dr. Sorto, JAZZ Velasquez , Hepatologist: Dr. Malagon and ICU team in AM rounds.   Disciplines not in attendance will be notified of the plan.     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     s/p OLT 11/15 with post op course c/b:  Hypoxia: Reintubated 11/20, extubated 11/24->reintubated 11/24, extubated 11/26, reintubated 12/7  Ileus   Fever  A fib  AMS/Seizure   L brachial DVT  Neutropenia  E coli Bacteremia (12/6)  s/p Coloctomy 12/7.   IABP 12/7, removed 12/10    Interval/Overnight Events:                         Immunosuppression:  -Cyclo 25BID, MMF held, Solu 16  -ongoing monitoring for signs of rejection                    ROS: Unable to assess patient is lethargic, s/p tracheostomy    PHYSICAL EXAM:   Constitutional: trach to vent, lethargic  Eyes:  PERRLA  ENMT: nc/at, no thrush   Neck: supple   Respiratory: CTA B/L  Cardiovascular: RRR  Gastrointestinal: incision clean/dry/intact + Ostomy pink   Genitourinary: +scrotal edema, Castro in place  Extremities: SCD's in place and working bilaterally, + LE edema  Neurological: trach to vent , sedated   Skin: no rashes, ulcerations, lesions  Transplant Surgery - Multidisciplinary Rounds  --------------------------------------------------------------  OLT   11/15/2024        POD#34  Colectomy 12/7/2024   POD#12  IABP 12/7 removed 12/10  Tracheostomy 12/17/2024 POD#2    Present:   Patient seen and examined with multidisciplinary Transplant team including Surgeon: Dr. Vora, Dr. Sorto, JAZZ Velasquez , Hepatologist: Dr. Malagon and ICU team in AM rounds.   Disciplines not in attendance will be notified of the plan.     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     s/p OLT 11/15 with post op course c/b:  Hypoxia: Reintubated 11/20, extubated 11/24->reintubated 11/24, extubated 11/26, reintubated 12/7  Ileus   Fever  A fib  AMS/Seizure   L brachial DVT  Neutropenia  E coli Bacteremia (12/6)  s/p Coloctomy 12/7.   IABP 12/7, removed 12/10    Interval/Overnight Events:   - febrile to 100.6 ON, rest vital stable  - iHD 0.5L removed 2/2 worsening uremia        Immunosuppression:  -Cyclo by level , MMF held, Solu 32  -ongoing monitoring for signs of rejection      MEDICATIONS  (STANDING):  albumin human  5% IVPB 500 milliLiter(s) IV Intermittent once  albuterol/ipratropium for Nebulization 3 milliLiter(s) Nebulizer every 6 hours  buDESOnide    Inhalation Suspension 0.5 milliGRAM(s) Inhalation every 12 hours  caspofungin IVPB      caspofungin IVPB 50 milliGRAM(s) IV Intermittent every 24 hours  chlorhexidine 0.12% Liquid 15 milliLiter(s) Oral Mucosa every 12 hours  chlorhexidine 2% Cloths 1 Application(s) Topical <User Schedule>  cycloSPORINE  , modified (GENGRAF) Solution 25 milliGRAM(s) Oral once  cycloSPORINE  , modified (GENGRAF) Solution 25 milliGRAM(s) Oral <User Schedule>  heparin   Injectable 5000 Unit(s) SubCutaneous every 12 hours  insulin lispro (ADMELOG) corrective regimen sliding scale   SubCutaneous every 6 hours  insulin NPH human recombinant 12 Unit(s) SubCutaneous every 6 hours  meropenem  IVPB 1000 milliGRAM(s) IV Intermittent every 12 hours  methylPREDNISolone sodium succinate Injectable 32 milliGRAM(s) IV Push <User Schedule>  norepinephrine Infusion 0.02 MICROgram(s)/kG/Min (3.51 mL/Hr) IV Continuous <Continuous>  nystatin Powder 1 Application(s) Topical every 12 hours  pantoprazole  Injectable 40 milliGRAM(s) IV Push daily    MEDICATIONS  (PRN):      PAST MEDICAL & SURGICAL HISTORY:  Diabetes      Transaminitis      Paroxysmal atrial fibrillation      Depression      BPH (benign prostatic hyperplasia)      Hypertension      Chronic atrial fibrillation      Coronary artery disease      Hepatocellular carcinoma      DM (diabetes mellitus)      HTN (hypertension)      Paroxysmal atrial fibrillation      Cirrhosis      HCC (hepatocellular carcinoma)      History of BPH      History of laparoscopic cholecystectomy      History of lumbar laminectomy      H/O prior ablation treatment      H/O percutaneous left heart catheterization          Vital Signs Last 24 Hrs  T(C): 38.1 (19 Dec 2024 07:00), Max: 38.1 (19 Dec 2024 07:00)  T(F): 100.6 (19 Dec 2024 07:00), Max: 100.6 (19 Dec 2024 07:00)  HR: 91 (19 Dec 2024 11:15) (82 - 101)  BP: 96/51 (19 Dec 2024 10:00) (84/48 - 158/67)  BP(mean): 70 (19 Dec 2024 10:00) (60 - 112)  RR: 26 (19 Dec 2024 10:00) (24 - 41)  SpO2: 100% (19 Dec 2024 11:15) (96% - 100%)    Parameters below as of 19 Dec 2024 11:15  Patient On (Oxygen Delivery Method): ventilator        I&O's Summary    18 Dec 2024 07:01  -  19 Dec 2024 07:00  --------------------------------------------------------  IN: 2906.3 mL / OUT: 2865 mL / NET: 41.3 mL    19 Dec 2024 07:01  -  19 Dec 2024 12:37  --------------------------------------------------------  IN: 665 mL / OUT: 55 mL / NET: 610 mL                              7.1    11.11 )-----------( 61       ( 19 Dec 2024 11:35 )             20.8     12-19    138  |  102  |  101[H]  ----------------------------<  148[H]  4.0   |  16[L]  |  2.36[H]    Ca    7.5[L]      19 Dec 2024 11:35  Phos  4.5     12-19  Mg     2.0     12-19    TPro  4.1[L]  /  Alb  3.4  /  TBili  14.6[H]  /  DBili  x   /  AST  67[H]  /  ALT  61[H]  /  AlkPhos  180[H]  12-19          Culture - Urine (collected 12-16-24 @ 13:10)  Source: Catheterized Catheterized  Final Report (12-19-24 @ 07:41):    10,000 - 49,000 CFU/mL Enterococcus faecalis  Organism: Enterococcus faecalis (12-19-24 @ 07:41)  Organism: Enterococcus faecalis (12-19-24 @ 07:41)    Culture - Blood (collected 12-16-24 @ 13:00)  Source: .Blood BLOOD  Preliminary Report (12-18-24 @ 18:01):    No growth at 48 Hours    Culture - Bronchial (collected 12-16-24 @ 08:36)  Source: Combi-Cath  Gram Stain (12-16-24 @ 16:27):    No polymorphonuclear leukocytes seen per low power field    No Squamous epithelial cells seen per low power field    No organisms seen per oil power field  Final Report (12-17-24 @ 23:17):    Commensal charity consistent with body site    Culture - Blood (collected 12-16-24 @ 07:45)  Source: .Blood BLOOD  Preliminary Report (12-19-24 @ 12:01):    No growth at 72 Hours    Culture - Bronchial (collected 12-14-24 @ 03:17)  Source: Combi-Cath  Gram Stain (12-14-24 @ 14:01):    Numerous polymorphonuclear leukocytes per low power field    No Squamous epithelial cells per low power field    No organisms seen per oil power field  Final Report (12-15-24 @ 16:14):    Commensal charity consistent with body site    Culture - Fungal, Other (collected 12-12-24 @ 18:00)  Source: .Other  Preliminary Report (12-14-24 @ 23:02):    Culture is being performed. Fungal cultures are held for 4 weeks.                ROS: Unable to assess patient is lethargic, s/p tracheostomy    PHYSICAL EXAM:   Constitutional: trach to vent, lethargic  Eyes:  PERRLA  ENMT: nc/at, no thrush   Neck: supple   Respiratory: CTA B/L  Cardiovascular: RRR  Gastrointestinal: incision clean/dry/intact + Ostomy pink   Genitourinary: +scrotal edema, Castro in place  Extremities: SCD's in place and working bilaterally, + LE edema  Neurological: trach to vent , sedated   Skin: no rashes, ulcerations, lesions

## 2024-12-19 NOTE — CHART NOTE - NSCHARTNOTEFT_GEN_A_CORE
Dr. Rosenberg (Attending Physician)  Pt. developed narrow complex regular tachycardia at 130 with ST changes, unable to capture ECG during event. Converted after 4 minutes. Afterwards had posterior MI on ECG and lateral depressions. These resolved over the next 20 minutes. Pt. had no other change in clinical status. Unable to answer if having chest pain. Cards consulted. Bedside echo without clear wall motion abnormalities. Will give his metoprolol 12.5 mg tid. Prefer to hold amio gtt at this time but will likely need if persistent. Initial trop 195. Will trend q3h. Improving plts recently. Risks of asa, hep being considered.    This patient was critically ill with one or more vital organ systems at a high probability of imminent or life threatening deterioration. Complex decision making was required to assess and treat single or multiple vital organ system failure and/or prevent further life threatening deterioration of the patient’s condition.  All recent labwork and imaging was reviewed.  Total Critical Care Time 45 minutes.

## 2024-12-20 NOTE — PROGRESS NOTE ADULT - SUBJECTIVE AND OBJECTIVE BOX
24 HOUR EVENTS:  - Narrow complex tachycardia to 130s with ST changes, spontaneously converted, EKG after episode showing posterior MI and lateral ST depression  - Cards consulted -> possible vasopasm? will treat as ACS with hep gtt and ASA  - NPH 12u q6  - 100.6 febrile, workup sent  - Started SQH  - DC left IJ and right IJ shiley  - Levo restarted, metoprolol decreased to 25 q8  - 1 U PRBC for hgb 7.1  - Cyclosporine x2    NEURO  Exam: withdrawals to pain, occasionally follows commands    RESPIRATORY  RR: 18 (12-20-24 @ 00:00) (18 - 38)  SpO2: 100% (12-20-24 @ 00:00) (98% - 100%)  Exam: trached  Mechanical Ventilation: Mode: AC/ CMV (Assist Control/ Continuous Mandatory Ventilation), RR (machine): 14, RR (patient): 20, TV (machine): 500, FiO2: 30, PEEP: 5, ITime: 0.9, MAP: 7.6, PIP: 15    Meds: albuterol/ipratropium for Nebulization 3 milliLiter(s) Nebulizer every 6 hours  buDESOnide    Inhalation Suspension 0.5 milliGRAM(s) Inhalation every 12 hours      CARDIOVASCULAR  HR: 81 (12-20-24 @ 00:00) (81 - 121)  BP: 97/55 (12-20-24 @ 00:00) (78/43 - 156/88)  BP(mean): 73 (12-20-24 @ 00:00) (54 - 105)  VBG - ( 19 Dec 2024 20:56 )  pH: 7.33  /  pCO2: 31    /  pO2: 52    / HCO3: 16    / Base Excess: -8.7  /  SaO2: 83.1   Lactate: 2.6      Exam:   Cardiac Rhythm:   Perfusion     [ x]Adequate   [ ]Inadequate  Mentation   [ x]Normal       [ ]Reduced  Extremities  [ x]Warm         [ ]Cool  Volume Status [ ]Hypervolemic [x ]Euvolemic [ ]Hypovolemic  Meds: metoprolol tartrate 12.5 milliGRAM(s) Enteral Tube every 8 hours      GI/NUTRITION  Exam: soft, NT/ND  Diet: tube feeds  Meds: pantoprazole  Injectable 40 milliGRAM(s) IV Push daily      GENITOURINARY  I&O's Detail    12-18 @ 07:01  -  12-19 @ 07:00  --------------------------------------------------------  IN:    Albumin 5%  - 500 mL: 1000 mL    Enteral Tube Flush: 125 mL    IV PiggyBack: 510 mL    Nepro with Carb Steady: 1265 mL    Norepinephrine: 6.3 mL  Total IN: 2906.3 mL    OUT:    Bulb (mL): 550 mL    Bulb (mL): 705 mL    Ileostomy (mL): 475 mL    Indwelling Catheter - Urethral (mL): 635 mL    Other (mL): 500 mL    VAC (Vacuum Assisted Closure) System (mL): 0 mL  Total OUT: 2865 mL    Total NET: 41.3 mL      12-19 @ 07:01  -  12-20 @ 02:03  --------------------------------------------------------  IN:    Albumin 5%  - 500 mL: 1500 mL    Enteral Tube Flush: 120 mL    Heparin: 42 mL    IV PiggyBack: 610 mL    Nepro with Carb Steady: 990 mL    Norepinephrine: 2.7 mL    PRBCs (Packed Red Blood Cells): 300 mL  Total IN: 3564.7 mL    OUT:    Bulb (mL): 400 mL    Bulb (mL): 1040 mL    Ileostomy (mL): 650 mL    Indwelling Catheter - Urethral (mL): 365 mL    VAC (Vacuum Assisted Closure) System (mL): 200 mL  Total OUT: 2655 mL    Total NET: 909.7 mL          12-19    138  |  105  |  121[H]  ----------------------------<  230[H]  4.4   |  14[L]  |  2.74[H]    Ca    7.7[L]      19 Dec 2024 21:02  Phos  5.7     12-19  Mg     2.1     12-19    TPro  4.1[L]  /  Alb  3.2[L]  /  TBili  13.6[H]  /  DBili  x   /  AST  56[H]  /  ALT  64[H]  /  AlkPhos  165[H]  12-19    Meds: albumin human  5% IVPB 500 milliLiter(s) IV Intermittent once      HEMATOLOGIC  Meds: aspirin  chewable 81 milliGRAM(s) Oral daily  heparin  Infusion 1400 Unit(s)/Hr IV Continuous <Continuous>                          8.9    11.41 )-----------( 58       ( 19 Dec 2024 21:02 )             26.3     PT/INR - ( 19 Dec 2024 21:02 )   PT: 18.0 sec;   INR: 1.57 ratio         PTT - ( 19 Dec 2024 21:02 )  PTT:51.0 sec    INFECTIOUS DISEASES  T(C): 37.5 (12-19-24 @ 23:00), Max: 38.5 (12-19-24 @ 11:00)  Wt(kg): --  WBC Count: 11.41 K/uL (12-19 @ 21:02)  WBC Count: 11.11 K/uL (12-19 @ 11:35)    Recent Cultures:  Specimen Source: Trach Asp Tracheal Aspirate, 12-19 @ 14:31; Results --; Gram Stain:   Moderate polymorphonuclear leukocytes per low power field  Rare Squamous epithelial cells per low power field  Numerous Gram Negative Rods per oil power field  Numerous Yeast like cells per oil power field[!]; Organism: --  Specimen Source: Catheterized Catheterized, 12-16 @ 13:10; Results   10,000 - 49,000 CFU/mL Enterococcus faecalis[!]; Gram Stain: --; Organism: Enterococcus faecalis[!]  Specimen Source: .Blood BLOOD, 12-16 @ 13:00; Results   No growth at 72 Hours; Gram Stain: --; Organism: --  Specimen Source: Combi-Cath, 12-16 @ 08:36; Results   Commensal charity consistent with body site; Gram Stain:   No polymorphonuclear leukocytes seen per low power field  No Squamous epithelial cells seen per low power field  No organisms seen per oil power field; Organism: --  Specimen Source: .Blood BLOOD, 12-16 @ 07:45; Results   No growth at 72 Hours; Gram Stain: --; Organism: --  Specimen Source: Combi-Cath, 12-14 @ 03:17; Results   Commensal charity consistent with body site; Gram Stain:   Numerous polymorphonuclear leukocytes per low power field  No Squamous epithelial cells per low power field  No organisms seen per oil power field; Organism: --    Meds: caspofungin IVPB 50 milliGRAM(s) IV Intermittent every 24 hours  caspofungin IVPB      meropenem  IVPB 1000 milliGRAM(s) IV Intermittent every 12 hours      ENDOCRINE  Capillary Blood Glucose    Meds: insulin lispro (ADMELOG) corrective regimen sliding scale   SubCutaneous every 6 hours  insulin NPH human recombinant 12 Unit(s) SubCutaneous every 6 hours  methylPREDNISolone sodium succinate Injectable 32 milliGRAM(s) IV Push <User Schedule>      ACCESS DEVICES:  [x ] Peripheral IV  [ ] Central Venous Line		[ ] R	[ ] L	[ ] IJ	[ ] Fem	[ ] SC	Placed:   [ ] Arterial Line			[ ] R	[ ] L	[ ] Fem	[ ] Rad	[ ] Ax	Placed:   [ ] PICC:					[ ] Mediport  [ ] Urinary Catheter, Date Placed:   [ ] Necessity of urinary, arterial, and venous catheters discussed    OTHER MEDICATIONS:  chlorhexidine 0.12% Liquid 15 milliLiter(s) Oral Mucosa every 12 hours  chlorhexidine 2% Cloths 1 Application(s) Topical <User Schedule>  nystatin Powder 1 Application(s) Topical every 12 hours      IMAGING:

## 2024-12-20 NOTE — CHART NOTE - NSCHARTNOTEFT_GEN_A_CORE
NUTRITION FOLLOW UP NOTE    PATIENT SEEN FOR: Malnutrition/SICU Follow Up     SOURCE: [] Patient  [x] Current Medical Record  [] RN  [] Family/support person at bedside  [X] Patient unavailable/inappropriate  [X] Other:  interdisciplinary rounds     CHART REVIEWED/EVENTS NOTED.  [X] No changes to nutrition care plan to note  [X] Nutrition Status:  --OLT  11/15/2024 POD#34  --Colectomy 2024  POD#12  --IABP  removed 12/10  --Tracheostomy 2024 POD#2  -- S/P iHD  500 ml fluid removed      DIET ORDER:   Diet, NPO with Tube Feed:   Tube Feeding Modality: Nasogastric  Nepro with Carb Steady (NEPRORTH)  Total Volume for 24 Hours (mL): 1320  Continuous  Starting Tube Feed Rate {mL per Hour}: 55  Until Goal Tube Feed Rate (mL per Hour): 55  Tube Feed Duration (in Hours): 24 (-)      CURRENT DIET ORDER IS:  [X] Appropriate:   [] Inadequate:  [] Other:    NUTRITION INTAKE/PROVISION:  [] PO:  [X] Enteral Nutrition: ENTERAL NUTRITION Order Provides:  1320mL of formula, 2336kcal/day (29kcal/kg), 107g protein/day (1.3g/kg), 960mL free water - based on 80.7kg   Current Pump Rate: on hold during RD visit  EN provision: 88 % EN volume goal provided in past 4 days  (-)  [] Parenteral Nutrition:    ANTHROPOMETRICS:  Drug Dosing Weight  Height (cm): 182.9 (09 Dec 2024 10:11)  Weight (kg): 93.6 (09 Dec 2024 10:11)  BMI (kg/m2): 28 (09 Dec 2024 10:11)  BSA (m2): 2.16 (09 Dec 2024 10:11)  Weights:   Daily Weight in k.6 (12-11), Weight in k (-10)     NUTRITIONALLY PERTINENT MEDICATIONS  (STANDING):  albumin human  5% IVPB 500 milliLiter(s) IV Intermittent onc  caspofungin IVPB      caspofungin IVPB 50 milliGRAM(s) IV Intermittent every 24 hours  heparin  Infusion 1000 Unit(s)/Hr (10 mL/Hr) IV Continuous <Continuous>  insulin lispro (ADMELOG) corrective regimen sliding scale   SubCutaneous every 6 hours  insulin NPH human recombinant 15 Unit(s) SubCutaneous every 6 hours  linezolid  IVPB 600 milliGRAM(s) IV Intermittent once  linezolid  IVPB 600 milliGRAM(s) IV Intermittent every 12 hours  linezolid  IVPB      meropenem  IVPB 1000 milliGRAM(s) IV Intermittent every 12 hours  methylPREDNISolone sodium succinate Injectable 32 milliGRAM(s) IV Push <User Schedule>  metoprolol tartrate 12.5 milliGRAM(s) Enteral Tube every 8 hours  nystatin Powder 1 Application(s) Topical every 12 hours  oxyCODONE    Solution 2.5 milliGRAM(s) Oral once  pantoprazole  Injectable 40 milliGRAM(s) IV Push daily        NUTRITIONALLY PERTINENT LABS:   Na138 mmol/L Glu 201 mg/dL[H] K+ 4.4 mmol/L Cr  3.01 mg/dL[H]  mg/dL[H]  Phos 6.0 mg/dL[H]  Alb 3.1 g/dL[L] ALT 57 U/L[H] AST 52 U/L[H] Alkaline Phosphatase 130 U/L[H]      Finger Sticks:  POCT Blood Glucose.: 207 mg/dL ( @ 05:08)  POCT Blood Glucose.: 251 mg/dL ( @ 23:58)  POCT Blood Glucose.: 215 mg/dL ( @ 18:31)      NUTRITIONALLY PERTINENT MEDICATIONS/LABS:  [x] Reviewed  [X] Relevant notes on medications/labs:  - Hyperphosphatemic   - Solu-medrol;  insulin sliding scale and NPH 15U Q6hr for coverage     EDEMA:  [x] Reviewed  [X] Relevant notes: +4 generalized edema per flow sheets     GI/ I&O:  [x] Reviewed  [X] Relevant notes: Ileostomy output per flow sheets 0 ml thus far (), 900 ml (), 475ml ()   [] Other:    SKIN:   [] No pressure injuries documented, per nursing flowsheet  [] Pressure injury previously noted  [X] Change in pressure injury documentation: Sacral/ buttocks unstageable pressure injury per nursing wound care note on 24   [] Other:    ESTIMATED NEEDS:  [X] No change:  [] Updated:  Energy:  2178-2582kcal/day (27-32 kcal/kg)  Protein:  97-121g/day (1.2-1.5 g/kg)  Fluid:   ml/day or [X] defer to team  Based on: ideal body weight of  80.7kg     NUTRITION DIAGNOSIS:  [X] Prior Dx:  1. Increased protein-energy needs   2. Severe malnutrition (Acute)  [] New Dx:    EDUCATION:  [] Yes:  [X] Not appropriate/warranted    NUTRITION CARE PLAN:  1. Diet: As tolerated, recommend to continue Nepro @ 55mL x 24hrs providing 1320mL of formula, 2336kcal/day (29kcal/kg), 107g protein/day (1.3g/kg), 960mL free water - based on  IBW 80.7kg   - If pt continues tolerate 24 hour feedings, consider adjusting tube feeding goal rate/ duration to Nepro @ 70ml/hr X 18 hours, which would provide 1260 ml of formula, 2230 kcal/day (28kcal/kg), 102g protein/day (1.3g/kg), 916mL free water - based on  IBW 80.7kg   2. Supplements: N/A  3. Multivitamin/mineral supplementation: Add Nephro-Naz 1x/Day for micronutrient losses with hemodialysis     [] Achieved - Continue current nutrition intervention(s)  [] Current medical condition precludes nutrition intervention at this time.    MONITORING AND EVALUATION:   RD remains available upon request and will follow up per protocol.    Deedee Leigh, MS,RDN,CDN,CNSC   Available on [facility] TEAMS

## 2024-12-20 NOTE — PROGRESS NOTE ADULT - ASSESSMENT
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 Patient's post-op course has been c/b multiple reintubation, required total colectomy w/ ileostomy, and IABP placement 2/2 cardiogenic shock with subsequent removal.     PLAN:    Neuro:  - Opens eyes, Not following commands, off sedation  - pain control w/ oxycodone  - CT head 11/19, 11/21, 11/25, 11/29, 12/5 negative  - EEG: Mild diffuse cerebral dysfunction that is not specific in etiology. No epileptic discharges recorded. No seizures recorded.  - Holding home xanax, Abilify, Zoloft, Remeron, Lexapro     Resp:  - S/p bedside tracheostomy 12/17  -PRVC 14/500/5/30  -SBT daily  -c/w inhaled bronchodilator  -VBG daily    CV:  -IABP removed 12/10  -s/p dobutamine and amiodarone gtt   -home metoprolol 50 q8hr  -trend lactate 2.7 (12/18)  -TTE 12/11 shows EF of 55-60%  - +/- levo    GI:  -trend LFTs  -NPO w/ tube feeds  -KO tube post pyloric  -protonix QD  -high output from ALYSSA drains > repletions ordered    /Renal:  -off CRRT since 12/15, did not respond to lasixs afterwards  -HD 12/18  -shiley now removed for line holiday given fever  -Monitor BUN/Cr  -I&Os, trend UOP  -Scrotal US 12/15 -> edema, no abscess -> elevate    Heme:  -trend H/H  -monitor coags    ID:  -ABx transition to jeniffer, caspo (12/16-)  -monitor WBC, fever curve  -CMV PPx w/ ganciclovir (holding now iso thrombocytopenia)  -holding cellcept, cyclosporine  -line holiday    Endo:  -monitor glucose   -SDS   -NPH 12U Q6, ISS    Code Status: full code    Disposition: BON Taveras PA-C     [id number]

## 2024-12-20 NOTE — ADVANCED PRACTICE NURSE CONSULT - ASSESSMENT
In at bedside with colleague & PT wound care Sharon Morgan for routine vac dsg & complete ostomy pouching system changes. (pls see separate notes). Chart reviewed & events noted. Complete pouching system changed. Stoma 1 5/8" red & viable, scant liquid bilious effluent noted in pouch. Some bleeding noted at mucocutaneous junction controlled by gentle pressure. Peristomal skin & mucocutaneous junction intact. + creases/skin indents at 3&9 o'clock. Repouched w/ 2 1/4" flat skin barrier, bead of stoma paste applied to skin creases to level surface & at the back of skin barrier near opening to caulk & drainable pouch. Pt tolerated procedure well. Safety maintained. Supplies with pattern left at bedside.

## 2024-12-20 NOTE — PROGRESS NOTE ADULT - SUBJECTIVE AND OBJECTIVE BOX
Transplant Surgery - Multidisciplinary Rounds  --------------------------------------------------------------  OLT   11/15/2024        POD#34  Colectomy 12/7/2024   POD#12  IABP 12/7 removed 12/10  Tracheostomy 12/17/2024 POD#2    Present:   Patient seen and examined with multidisciplinary Transplant team including Surgeon: Dr. Vora, Dr. Sorto, JAZZ Velasquez , Hepatologist: Dr. Malagon and ICU team in AM rounds.   Disciplines not in attendance will be notified of the plan.     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     s/p OLT 11/15 with post op course c/b:  Hypoxia: Reintubated 11/20, extubated 11/24->reintubated 11/24, extubated 11/26, reintubated 12/7  Ileus   Fever  A fib  AMS/Seizure   L brachial DVT  Neutropenia  E coli Bacteremia (12/6)  s/p Coloctomy 12/7.   IABP 12/7, removed 12/10    Interval/Overnight Events:   - tachycardia to 130s with ST depression, resolved after 20min, trop elevated to 190s x 2, CKMB normal  - cards: posterior wall MI vs coronary vasospasm, will treat as ACS  - hep gtt initiated, no bolus, goal PTT 50-60  - intermittently off pressors, however started on beta blockade for ACS  - febrile to 100.6 12/19 ON, recultured > tracheal aspirate showing GNR and yeast like cells (already on jeniffer and caspo)    Immunosuppression:  -Cyclo by level , MMF held, Solu 32  -ongoing monitoring for signs of rejection    MEDICATIONS  (STANDING):  albumin human  5% IVPB 500 milliLiter(s) IV Intermittent once  albuterol/ipratropium for Nebulization 3 milliLiter(s) Nebulizer every 6 hours  aspirin  chewable 81 milliGRAM(s) Oral daily  buDESOnide    Inhalation Suspension 0.5 milliGRAM(s) Inhalation every 12 hours  caspofungin IVPB 50 milliGRAM(s) IV Intermittent every 24 hours  caspofungin IVPB      chlorhexidine 0.12% Liquid 15 milliLiter(s) Oral Mucosa every 12 hours  chlorhexidine 2% Cloths 1 Application(s) Topical <User Schedule>  heparin  Infusion 1400 Unit(s)/Hr (14 mL/Hr) IV Continuous <Continuous>  insulin lispro (ADMELOG) corrective regimen sliding scale   SubCutaneous every 6 hours  insulin NPH human recombinant 12 Unit(s) SubCutaneous every 6 hours  meropenem  IVPB 1000 milliGRAM(s) IV Intermittent every 12 hours  methylPREDNISolone sodium succinate Injectable 32 milliGRAM(s) IV Push <User Schedule>  metoprolol tartrate 12.5 milliGRAM(s) Enteral Tube every 8 hours  nystatin Powder 1 Application(s) Topical every 12 hours  pantoprazole  Injectable 40 milliGRAM(s) IV Push daily    MEDICATIONS  (PRN):      PAST MEDICAL & SURGICAL HISTORY:  Diabetes      Transaminitis      Paroxysmal atrial fibrillation      Depression      BPH (benign prostatic hyperplasia)      Hypertension      Chronic atrial fibrillation      Coronary artery disease      Hepatocellular carcinoma      DM (diabetes mellitus)      HTN (hypertension)      Paroxysmal atrial fibrillation      Cirrhosis      HCC (hepatocellular carcinoma)      History of BPH      History of laparoscopic cholecystectomy      History of lumbar laminectomy      H/O prior ablation treatment      H/O percutaneous left heart catheterization          Vital Signs Last 24 Hrs  T(C): 37.5 (19 Dec 2024 23:00), Max: 38.5 (19 Dec 2024 11:00)  T(F): 99.5 (19 Dec 2024 23:00), Max: 101.3 (19 Dec 2024 11:00)  HR: 81 (20 Dec 2024 00:00) (81 - 121)  BP: 97/55 (20 Dec 2024 00:00) (78/43 - 156/88)  BP(mean): 73 (20 Dec 2024 00:00) (54 - 105)  RR: 18 (20 Dec 2024 00:00) (18 - 38)  SpO2: 100% (20 Dec 2024 00:00) (98% - 100%)    Parameters below as of 19 Dec 2024 23:03  Patient On (Oxygen Delivery Method): ventilator        I&O's Summary    18 Dec 2024 07:01  -  19 Dec 2024 07:00  --------------------------------------------------------  IN: 2906.3 mL / OUT: 2865 mL / NET: 41.3 mL    19 Dec 2024 07:01  -  20 Dec 2024 01:31  --------------------------------------------------------  IN: 3564.7 mL / OUT: 2655 mL / NET: 909.7 mL                              8.9    11.41 )-----------( 58       ( 19 Dec 2024 21:02 )             26.3     12-19    138  |  105  |  121[H]  ----------------------------<  230[H]  4.4   |  14[L]  |  2.74[H]    Ca    7.7[L]      19 Dec 2024 21:02  Phos  5.7     12-19  Mg     2.1     12-19    TPro  4.1[L]  /  Alb  3.2[L]  /  TBili  13.6[H]  /  DBili  x   /  AST  56[H]  /  ALT  64[H]  /  AlkPhos  165[H]  12-19      ROS: Unable to assess patient is lethargic, s/p tracheostomy    PHYSICAL EXAM:   Constitutional: trach to vent, lethargic  Eyes:  PERRLA  ENMT: nc/at, no thrush   Neck: supple   Respiratory: CTA B/L  Cardiovascular: RRR  Gastrointestinal: incision clean/dry/intact + Ostomy pink   Genitourinary: +scrotal edema, Castro in place  Extremities: SCD's in place and working bilaterally, + LE edema  Neurological: trach to vent , sedated   Skin: no rashes, ulcerations, lesions    Transplant Surgery - Multidisciplinary Rounds  --------------------------------------------------------------  OLT   11/15/2024        POD#34  Colectomy 12/7/2024   POD#12  IABP 12/7 removed 12/10  Tracheostomy 12/17/2024 POD#2    Present:   Patient seen and examined with multidisciplinary Transplant team including Surgeon: Dr. Vora, Dr. Sorto, JAZZ Pabon, Hepatologist: Dr. Malagon and ICU team in AM rounds.   Disciplines not in attendance will be notified of the plan.     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     s/p OLT 11/15 with post op course c/b:  Hypoxia: Reintubated 11/20, extubated 11/24->reintubated 11/24, extubated 11/26, reintubated 12/7  Ileus   Fever  A fib  AMS/Seizure   L brachial DVT  Neutropenia  E coli Bacteremia (12/6)  s/p Coloctomy 12/7.   IABP 12/7, removed 12/10    Interval/Overnight Events:   - tachycardia to 130s with ST depression, resolved after 20min, trop elevated to 190s x 2, CKMB normal  - cards: posterior wall MI vs coronary vasospasm, will treat as ACS  - hep gtt initiated, no bolus, goal PTT 50-60  - intermittently off pressors, however started on beta blockade for ACS  - febrile to 100.6 12/19 ON, recultured > tracheal aspirate showing GNR and yeast like cells (already on jeniffer and caspo)    Immunosuppression:  -Cyclo by level , MMF held, Solu 32  -ongoing monitoring for signs of rejection        MEDICATIONS  (STANDING):  albumin human  5% IVPB 500 milliLiter(s) IV Intermittent once  albuterol/ipratropium for Nebulization 3 milliLiter(s) Nebulizer every 6 hours  aspirin  chewable 81 milliGRAM(s) Oral daily  buDESOnide    Inhalation Suspension 0.5 milliGRAM(s) Inhalation every 12 hours  caspofungin IVPB 50 milliGRAM(s) IV Intermittent every 24 hours  caspofungin IVPB      chlorhexidine 0.12% Liquid 15 milliLiter(s) Oral Mucosa every 12 hours  chlorhexidine 2% Cloths 1 Application(s) Topical <User Schedule>  heparin  Infusion 1000 Unit(s)/Hr (10 mL/Hr) IV Continuous <Continuous>  insulin lispro (ADMELOG) corrective regimen sliding scale   SubCutaneous every 6 hours  insulin NPH human recombinant 15 Unit(s) SubCutaneous every 6 hours  linezolid  IVPB 600 milliGRAM(s) IV Intermittent once  linezolid  IVPB 600 milliGRAM(s) IV Intermittent every 12 hours  linezolid  IVPB      meropenem  IVPB 1000 milliGRAM(s) IV Intermittent every 12 hours  methylPREDNISolone sodium succinate Injectable 32 milliGRAM(s) IV Push <User Schedule>  metoprolol tartrate 12.5 milliGRAM(s) Enteral Tube every 8 hours  nystatin Powder 1 Application(s) Topical every 12 hours  oxyCODONE    Solution 2.5 milliGRAM(s) Oral once  pantoprazole  Injectable 40 milliGRAM(s) IV Push daily    MEDICATIONS  (PRN):      PAST MEDICAL & SURGICAL HISTORY:  Diabetes      Transaminitis      Paroxysmal atrial fibrillation      Depression      BPH (benign prostatic hyperplasia)      Hypertension      Chronic atrial fibrillation      Coronary artery disease      Hepatocellular carcinoma      DM (diabetes mellitus)      HTN (hypertension)      Paroxysmal atrial fibrillation      Cirrhosis      HCC (hepatocellular carcinoma)      History of BPH      History of laparoscopic cholecystectomy      History of lumbar laminectomy      H/O prior ablation treatment      H/O percutaneous left heart catheterization          Vital Signs Last 24 Hrs  T(C): 37 (20 Dec 2024 11:00), Max: 38.1 (19 Dec 2024 15:00)  T(F): 98.6 (20 Dec 2024 11:00), Max: 100.6 (19 Dec 2024 15:00)  HR: 87 (20 Dec 2024 12:00) (79 - 121)  BP: 109/57 (20 Dec 2024 12:00) (97/55 - 140/64)  BP(mean): 77 (20 Dec 2024 12:00) (73 - 92)  RR: 22 (20 Dec 2024 12:00) (14 - 31)  SpO2: 99% (20 Dec 2024 12:00) (97% - 100%)    Parameters below as of 20 Dec 2024 11:07  Patient On (Oxygen Delivery Method): ventilator        I&O's Summary    19 Dec 2024 07:01  -  20 Dec 2024 07:00  --------------------------------------------------------  IN: 4058.7 mL / OUT: 3365 mL / NET: 693.7 mL    20 Dec 2024 07:01  -  20 Dec 2024 13:16  --------------------------------------------------------  IN: 388.5 mL / OUT: 510 mL / NET: -121.5 mL                              7.7    7.70  )-----------( 44       ( 20 Dec 2024 09:05 )             22.6     12-20    138  |  103  |  123[H]  ----------------------------<  201[H]  4.4   |  15[L]  |  3.01[H]    Ca    7.7[L]      20 Dec 2024 09:05  Phos  6.0     12-20  Mg     2.6     12-20    TPro  4.0[L]  /  Alb  3.1[L]  /  TBili  10.5[H]  /  DBili  x   /  AST  52[H]  /  ALT  57[H]  /  AlkPhos  130[H]  12-20          Culture - Sputum (collected 12-19-24 @ 14:31)  Source: Trach Asp Tracheal Aspirate  Gram Stain (12-20-24 @ 00:18):    Moderate polymorphonuclear leukocytes per low power field    Rare Squamous epithelial cells per low power field    Numerous Gram Negative Rods per oil power field    Numerous Yeast like cells per oil power field    Culture - Blood (collected 12-19-24 @ 03:28)  Source: .Blood BLOOD  Preliminary Report (12-20-24 @ 07:01):    No growth at 24 hours    Culture - Blood (collected 12-19-24 @ 03:28)  Source: .Blood BLOOD  Preliminary Report (12-20-24 @ 07:01):    No growth at 24 hours    Culture - Urine (collected 12-16-24 @ 13:10)  Source: Catheterized Catheterized  Final Report (12-19-24 @ 07:41):    10,000 - 49,000 CFU/mL Enterococcus faecalis  Organism: Enterococcus faecalis (12-19-24 @ 07:41)  Organism: Enterococcus faecalis (12-19-24 @ 07:41)    Culture - Blood (collected 12-16-24 @ 13:00)  Source: .Blood BLOOD  Preliminary Report (12-19-24 @ 18:00):    No growth at 72 Hours    Culture - Bronchial (collected 12-16-24 @ 08:36)  Source: Combi-Cath  Gram Stain (12-16-24 @ 16:27):    No polymorphonuclear leukocytes seen per low power field    No Squamous epithelial cells seen per low power field    No organisms seen per oil power field  Final Report (12-17-24 @ 23:17):    Commensal charity consistent with body site    Culture - Blood (collected 12-16-24 @ 07:45)  Source: .Blood BLOOD  Preliminary Report (12-20-24 @ 12:00):    No growth at 4 days    Culture - Bronchial (collected 12-14-24 @ 03:17)  Source: Combi-Cath  Gram Stain (12-14-24 @ 14:01):    Numerous polymorphonuclear leukocytes per low power field    No Squamous epithelial cells per low power field    No organisms seen per oil power field  Final Report (12-15-24 @ 16:14):    Commensal charity consistent with body site                ROS: Unable to assess patient is lethargic, s/p tracheostomy    PHYSICAL EXAM:   Constitutional: trach to vent, lethargic  Eyes:  PERRLA  ENMT: nc/at, no thrush   Neck: supple   Respiratory: CTA B/L  Cardiovascular: RRR  Gastrointestinal: incision clean/dry/intact + Ostomy pink   Genitourinary: +scrotal edema, Castro in place  Extremities: SCD's in place and working bilaterally, + LE edema  Neurological: trach to vent , sedated   Skin: no rashes, ulcerations, lesions    Transplant Surgery - Multidisciplinary Rounds  --------------------------------------------------------------  OLT   11/15/2024        POD#35  Colectomy 12/7/2024   POD#13  IABP 12/7 removed 12/10  Tracheostomy 12/17/2024    Present:   Patient seen and examined with multidisciplinary Transplant team including Surgeon: Dr. Vora, Dr. Sorto, JAZZ Pabon, Hepatologist: Dr. Malagon and ICU team in AM rounds.   Disciplines not in attendance will be notified of the plan.     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     s/p OLT 11/15 with post op course c/b:  Hypoxia: Reintubated 11/20, extubated 11/24->reintubated 11/24, extubated 11/26, reintubated 12/7  Ileus   Fever  A fib  AMS/Seizure   L brachial DVT  Neutropenia  E coli Bacteremia (12/6)  s/p Coloctomy 12/7.   IABP 12/7, removed 12/10    Interval/Overnight Events:   - tachycardia to 130s with ST depression, resolved after 20min, trop elevated to 190s x 2, CKMB normal  - cards: posterior wall MI vs coronary vasospasm, will treat as ACS  - hep gtt initiated, no bolus, goal PTT 50-60  - intermittently off pressors, however started on beta blockade for ACS  - febrile to 100.6 12/19 ON, recultured > tracheal aspirate showing GNR and yeast like cells (already on jeniffer and caspo)    Immunosuppression:  -Cyclo by level , MMF held, Solu 32  -ongoing monitoring for signs of rejection      MEDICATIONS  (STANDING):  albumin human  5% IVPB 500 milliLiter(s) IV Intermittent once  albuterol/ipratropium for Nebulization 3 milliLiter(s) Nebulizer every 6 hours  aspirin  chewable 81 milliGRAM(s) Oral daily  buDESOnide    Inhalation Suspension 0.5 milliGRAM(s) Inhalation every 12 hours  caspofungin IVPB 50 milliGRAM(s) IV Intermittent every 24 hours  caspofungin IVPB      chlorhexidine 0.12% Liquid 15 milliLiter(s) Oral Mucosa every 12 hours  chlorhexidine 2% Cloths 1 Application(s) Topical <User Schedule>  heparin  Infusion 1000 Unit(s)/Hr (10 mL/Hr) IV Continuous <Continuous>  insulin lispro (ADMELOG) corrective regimen sliding scale   SubCutaneous every 6 hours  insulin NPH human recombinant 15 Unit(s) SubCutaneous every 6 hours  linezolid  IVPB 600 milliGRAM(s) IV Intermittent once  linezolid  IVPB 600 milliGRAM(s) IV Intermittent every 12 hours  linezolid  IVPB      meropenem  IVPB 1000 milliGRAM(s) IV Intermittent every 12 hours  methylPREDNISolone sodium succinate Injectable 32 milliGRAM(s) IV Push <User Schedule>  metoprolol tartrate 12.5 milliGRAM(s) Enteral Tube every 8 hours  nystatin Powder 1 Application(s) Topical every 12 hours  oxyCODONE    Solution 2.5 milliGRAM(s) Oral once  pantoprazole  Injectable 40 milliGRAM(s) IV Push daily    MEDICATIONS  (PRN):      PAST MEDICAL & SURGICAL HISTORY:  Diabetes      Transaminitis      Paroxysmal atrial fibrillation      Depression      BPH (benign prostatic hyperplasia)      Hypertension      Chronic atrial fibrillation      Coronary artery disease      Hepatocellular carcinoma      DM (diabetes mellitus)      HTN (hypertension)      Paroxysmal atrial fibrillation      Cirrhosis      HCC (hepatocellular carcinoma)      History of BPH      History of laparoscopic cholecystectomy      History of lumbar laminectomy      H/O prior ablation treatment      H/O percutaneous left heart catheterization          Vital Signs Last 24 Hrs  T(C): 37 (20 Dec 2024 11:00), Max: 38.1 (19 Dec 2024 15:00)  T(F): 98.6 (20 Dec 2024 11:00), Max: 100.6 (19 Dec 2024 15:00)  HR: 87 (20 Dec 2024 12:00) (79 - 121)  BP: 109/57 (20 Dec 2024 12:00) (97/55 - 140/64)  BP(mean): 77 (20 Dec 2024 12:00) (73 - 92)  RR: 22 (20 Dec 2024 12:00) (14 - 31)  SpO2: 99% (20 Dec 2024 12:00) (97% - 100%)    Parameters below as of 20 Dec 2024 11:07  Patient On (Oxygen Delivery Method): ventilator        I&O's Summary    19 Dec 2024 07:01  -  20 Dec 2024 07:00  --------------------------------------------------------  IN: 4058.7 mL / OUT: 3365 mL / NET: 693.7 mL    20 Dec 2024 07:01  -  20 Dec 2024 13:16  --------------------------------------------------------  IN: 388.5 mL / OUT: 510 mL / NET: -121.5 mL                              7.7    7.70  )-----------( 44       ( 20 Dec 2024 09:05 )             22.6     12-20    138  |  103  |  123[H]  ----------------------------<  201[H]  4.4   |  15[L]  |  3.01[H]    Ca    7.7[L]      20 Dec 2024 09:05  Phos  6.0     12-20  Mg     2.6     12-20    TPro  4.0[L]  /  Alb  3.1[L]  /  TBili  10.5[H]  /  DBili  x   /  AST  52[H]  /  ALT  57[H]  /  AlkPhos  130[H]  12-20          Culture - Sputum (collected 12-19-24 @ 14:31)  Source: Trach Asp Tracheal Aspirate  Gram Stain (12-20-24 @ 00:18):    Moderate polymorphonuclear leukocytes per low power field    Rare Squamous epithelial cells per low power field    Numerous Gram Negative Rods per oil power field    Numerous Yeast like cells per oil power field    Culture - Blood (collected 12-19-24 @ 03:28)  Source: .Blood BLOOD  Preliminary Report (12-20-24 @ 07:01):    No growth at 24 hours    Culture - Blood (collected 12-19-24 @ 03:28)  Source: .Blood BLOOD  Preliminary Report (12-20-24 @ 07:01):    No growth at 24 hours    Culture - Urine (collected 12-16-24 @ 13:10)  Source: Catheterized Catheterized  Final Report (12-19-24 @ 07:41):    10,000 - 49,000 CFU/mL Enterococcus faecalis  Organism: Enterococcus faecalis (12-19-24 @ 07:41)  Organism: Enterococcus faecalis (12-19-24 @ 07:41)    Culture - Blood (collected 12-16-24 @ 13:00)  Source: .Blood BLOOD  Preliminary Report (12-19-24 @ 18:00):    No growth at 72 Hours    Culture - Bronchial (collected 12-16-24 @ 08:36)  Source: Combi-Cath  Gram Stain (12-16-24 @ 16:27):    No polymorphonuclear leukocytes seen per low power field    No Squamous epithelial cells seen per low power field    No organisms seen per oil power field  Final Report (12-17-24 @ 23:17):    Commensal charity consistent with body site    Culture - Blood (collected 12-16-24 @ 07:45)  Source: .Blood BLOOD  Preliminary Report (12-20-24 @ 12:00):    No growth at 4 days    Culture - Bronchial (collected 12-14-24 @ 03:17)  Source: Combi-Cath  Gram Stain (12-14-24 @ 14:01):    Numerous polymorphonuclear leukocytes per low power field    No Squamous epithelial cells per low power field    No organisms seen per oil power field  Final Report (12-15-24 @ 16:14):    Commensal charity consistent with body site        ROS: Unable to assess patient is lethargic, s/p tracheostomy    PHYSICAL EXAM:   Constitutional: trach to vent, lethargic  Eyes:  PERRLA  ENMT: nc/at, no thrush   Neck: supple   Respiratory: CTA B/L  Cardiovascular: RRR  Gastrointestinal: incision clean/dry/intact + Ostomy pink   Genitourinary: +scrotal edema, Castro in place  Extremities: SCD's in place and working bilaterally, + LE edema  Neurological: trach to vent , sedated   Skin: no rashes, ulcerations, lesions

## 2024-12-20 NOTE — PROGRESS NOTE ADULT - ASSESSMENT
74 year-old with known coronary artery disease as above presents for liver transplant. Transplant performed 11/15.  Seen to have chest pain post transplant.  It was atypical of angina and has resolved.  Troponin remained negative.    PAF - currently sinus rhythm     Patient was septic on Friday, 12/6, and at that time, he was seen to be hyperdynamic with TODD and severe LVOT gradient.  Post ex-lap, patient seen to have newly reduced LVEF.  IABP placed. Inotrope and pressor support. IABP removed 12/10 without significant drop in cardiac output.  Weaned off both inotrope and pressors. Now restarted on beta-blocker.  Keep MAP >65 mmHg.    On 12/19, he had dynamic ECG changes concerning for acute occlusion of inferior-posterior territory. It was transient, resolved spontaneously without intervention, and serial troponin did not rise afterward.  Suspect transient occlusion of coronary. Now reperfused.   Start ASA and heparin gtt for acute coronary syndrome.  Continue beta-blocker.    Plan for PCI prior to discharge, but patient is not optimized for elective PCI at this time.    Heart Failure follow-up appreciated (they are not actively following at this time but could be reconsulted as needed).

## 2024-12-20 NOTE — PROGRESS NOTE ADULT - SUBJECTIVE AND OBJECTIVE BOX
INFECTIOUS DISEASES FOLLOW UP-- Renée Carrero  [phone]    This is a follow up note for this  74yMale with  Status post liver transplantation    interval events- cardiac issues overnight    ROS:  CONSTITUTIONAL: intubated, a bit more awake today      Allergies    No Known Allergies    Intolerances        ANTIBIOTICS/RELEVANT:  antimicrobials  caspofungin IVPB 50 milliGRAM(s) IV Intermittent every 24 hours  caspofungin IVPB      linezolid  IVPB 600 milliGRAM(s) IV Intermittent every 12 hours  linezolid  IVPB      meropenem  IVPB 1000 milliGRAM(s) IV Intermittent every 12 hours    immunologic:  cycloSPORINE  , modified (GENGRAF) Solution 25 milliGRAM(s) Oral <User Schedule>    OTHER:  albumin human  5% IVPB 500 milliLiter(s) IV Intermittent once  albuterol/ipratropium for Nebulization 3 milliLiter(s) Nebulizer every 6 hours  aspirin  chewable 81 milliGRAM(s) Oral daily  buDESOnide    Inhalation Suspension 0.5 milliGRAM(s) Inhalation every 12 hours  chlorhexidine 0.12% Liquid 15 milliLiter(s) Oral Mucosa every 12 hours  chlorhexidine 2% Cloths 1 Application(s) Topical <User Schedule>  chlorhexidine 2% Cloths 1 Application(s) Topical <User Schedule>  heparin  Infusion 1000 Unit(s)/Hr IV Continuous <Continuous>  insulin lispro (ADMELOG) corrective regimen sliding scale   SubCutaneous every 6 hours  insulin NPH human recombinant 15 Unit(s) SubCutaneous every 6 hours  methylPREDNISolone sodium succinate Injectable 32 milliGRAM(s) IV Push <User Schedule>  metoprolol tartrate 12.5 milliGRAM(s) Enteral Tube every 8 hours  nystatin Powder 1 Application(s) Topical every 12 hours  oxyCODONE    Solution 2.5 milliGRAM(s) Oral once  pantoprazole  Injectable 40 milliGRAM(s) IV Push daily      Objective:  Vital Signs Last 24 Hrs  T(C): 37 (20 Dec 2024 15:00), Max: 37.6 (19 Dec 2024 19:00)  T(F): 98.6 (20 Dec 2024 15:00), Max: 99.7 (19 Dec 2024 19:00)  HR: 90 (20 Dec 2024 15:00) (79 - 121)  BP: 116/58 (20 Dec 2024 15:00) (97/55 - 153/80)  BP(mean): 83 (20 Dec 2024 15:00) (73 - 105)  RR: 26 (20 Dec 2024 15:00) (14 - 31)  SpO2: 99% (20 Dec 2024 15:00) (97% - 100%)    Parameters below as of 20 Dec 2024 11:07  Patient On (Oxygen Delivery Method): ventilator        PHYSICAL EXAM:  Constitutional:no acute distress  Eyes:DUSTIN, EOMI  Ear/Nose/Throat: no oral lesions, 	  Respiratory: clear BL  Cardiovascular: S1S2  Gastrointestinal:soft, (+) BS, no tenderness  Extremities:no e/e/c  No Lymphadenopathy  IV sites not inflammed.    LABS:                        7.7    7.70  )-----------( 44       ( 20 Dec 2024 09:05 )             22.6     12-20    138  |  103  |  123[H]  ----------------------------<  201[H]  4.4   |  15[L]  |  3.01[H]    Ca    7.7[L]      20 Dec 2024 09:05  Phos  6.0     12-20  Mg     2.6     12-20    TPro  4.0[L]  /  Alb  3.1[L]  /  TBili  10.5[H]  /  DBili  x   /  AST  52[H]  /  ALT  57[H]  /  AlkPhos  130[H]  12-20    PT/INR - ( 19 Dec 2024 21:02 )   PT: 18.0 sec;   INR: 1.57 ratio         PTT - ( 20 Dec 2024 09:05 )  PTT:139.4 sec  Urinalysis Basic - ( 20 Dec 2024 09:05 )    Color: x / Appearance: x / SG: x / pH: x  Gluc: 201 mg/dL / Ketone: x  / Bili: x / Urobili: x   Blood: x / Protein: x / Nitrite: x   Leuk Esterase: x / RBC: x / WBC x   Sq Epi: x / Non Sq Epi: x / Bacteria: x        MICROBIOLOGY:            RECENT CULTURES:  12-19 @ 14:31  Trach Asp Tracheal Aspirate  --  --  --  --  --  12-19 @ 03:28  .Blood BLOOD  --  --  --    No growth at 24 hours  --  12-16 @ 13:10  Catheterized Catheterized  Enterococcus faecalis  Enterococcus faecalis  ESAU    10,000 - 49,000 CFU/mL Enterococcus faecalis  --  12-16 @ 13:00  .Blood BLOOD  --  --  --    No growth at 72 Hours  --  12-16 @ 08:36  Combi-Cath  --  --  --    Commensal charity consistent with body site  --  12-16 @ 07:45  .Blood BLOOD  --  --  --    No growth at 4 days  --  12-14 @ 03:17  Combi-Cath  --  --  --    Commensal charity consistent with body site  --      RADIOLOGY & ADDITIONAL STUDIES:    < from: Xray Chest 1 View- PORTABLE-Urgent (Xray Chest 1 View- PORTABLE-Urgent .) (12.18.24 @ 16:17) >  IMPRESSION:    Life-support devices as described above.  No focal consolidation.    < end of copied text >   INFECTIOUS DISEASES FOLLOW UP-- Renée Carrero  [phone]    This is a follow up note for this  74yMale with  Status post liver transplantation    interval events- cardiac issues overnight  somewhat more awake    ROS:  CONSTITUTIONAL: intubated, a bit more awake today      Allergies    No Known Allergies    Intolerances        ANTIBIOTICS/RELEVANT:  antimicrobials  caspofungin IVPB 50 milliGRAM(s) IV Intermittent every 24 hours  caspofungin IVPB      linezolid  IVPB 600 milliGRAM(s) IV Intermittent every 12 hours  linezolid  IVPB      meropenem  IVPB 1000 milliGRAM(s) IV Intermittent every 12 hours    immunologic:  cycloSPORINE  , modified (GENGRAF) Solution 25 milliGRAM(s) Oral <User Schedule>    OTHER:  albumin human  5% IVPB 500 milliLiter(s) IV Intermittent once  albuterol/ipratropium for Nebulization 3 milliLiter(s) Nebulizer every 6 hours  aspirin  chewable 81 milliGRAM(s) Oral daily  buDESOnide    Inhalation Suspension 0.5 milliGRAM(s) Inhalation every 12 hours  chlorhexidine 0.12% Liquid 15 milliLiter(s) Oral Mucosa every 12 hours  chlorhexidine 2% Cloths 1 Application(s) Topical <User Schedule>  chlorhexidine 2% Cloths 1 Application(s) Topical <User Schedule>  heparin  Infusion 1000 Unit(s)/Hr IV Continuous <Continuous>  insulin lispro (ADMELOG) corrective regimen sliding scale   SubCutaneous every 6 hours  insulin NPH human recombinant 15 Unit(s) SubCutaneous every 6 hours  methylPREDNISolone sodium succinate Injectable 32 milliGRAM(s) IV Push <User Schedule>  metoprolol tartrate 12.5 milliGRAM(s) Enteral Tube every 8 hours  nystatin Powder 1 Application(s) Topical every 12 hours  oxyCODONE    Solution 2.5 milliGRAM(s) Oral once  pantoprazole  Injectable 40 milliGRAM(s) IV Push daily      Objective:  Vital Signs Last 24 Hrs  T(C): 37 (20 Dec 2024 15:00), Max: 37.6 (19 Dec 2024 19:00)  T(F): 98.6 (20 Dec 2024 15:00), Max: 99.7 (19 Dec 2024 19:00)  HR: 90 (20 Dec 2024 15:00) (79 - 121)  BP: 116/58 (20 Dec 2024 15:00) (97/55 - 153/80)  BP(mean): 83 (20 Dec 2024 15:00) (73 - 105)  RR: 26 (20 Dec 2024 15:00) (14 - 31)  SpO2: 99% (20 Dec 2024 15:00) (97% - 100%)    Parameters below as of 20 Dec 2024 11:07  Patient On (Oxygen Delivery Method): ventilator        PHYSICAL EXAM:  Constitutional:no acute distress  Eyes:DUSTIN, EOMI  Ear/Nose/Throat: no oral lesions, but secretions through trach site	  Respiratory: clear BL  Cardiovascular: S1S2  Gastrointestinal:soft, (+) BS, no tenderness  Extremities:no e/e/c  No Lymphadenopathy  IV sites not inflammed.    LABS:                        7.7    7.70  )-----------( 44       ( 20 Dec 2024 09:05 )             22.6     12-20    138  |  103  |  123[H]  ----------------------------<  201[H]  4.4   |  15[L]  |  3.01[H]    Ca    7.7[L]      20 Dec 2024 09:05  Phos  6.0     12-20  Mg     2.6     12-20    TPro  4.0[L]  /  Alb  3.1[L]  /  TBili  10.5[H]  /  DBili  x   /  AST  52[H]  /  ALT  57[H]  /  AlkPhos  130[H]  12-20    PT/INR - ( 19 Dec 2024 21:02 )   PT: 18.0 sec;   INR: 1.57 ratio         PTT - ( 20 Dec 2024 09:05 )  PTT:139.4 sec  Urinalysis Basic - ( 20 Dec 2024 09:05 )    Color: x / Appearance: x / SG: x / pH: x  Gluc: 201 mg/dL / Ketone: x  / Bili: x / Urobili: x   Blood: x / Protein: x / Nitrite: x   Leuk Esterase: x / RBC: x / WBC x   Sq Epi: x / Non Sq Epi: x / Bacteria: x        MICROBIOLOGY:      Culture - Sputum . (12.19.24 @ 14:31)    Gram Stain:   Moderate polymorphonuclear leukocytes per low power field  Rare Squamous epithelial cells per low power field  Numerous Gram Negative Rods per oil power field  Numerous Yeast like cells per oil power field   Specimen Source: Trach Asp Tracheal Aspirate   Culture Results:   Moderate Stenotrophomonas maltophilia    Urine Microscopic-Add On (NC) (12.19.24 @ 13:53)    Comment - Urine: Amorphous urates seen   Hyaline Casts: Present   Bacteria: Negative /HPF   White Blood Cell - Urine: 2 /HPF   Red Blood Cell - Urine: 4 /HPF          RECENT CULTURES:  12-19 @ 14:31  Trach Asp Tracheal Aspirate  --  --  --  --  --  12-19 @ 03:28  .Blood BLOOD  --  --  --    No growth at 24 hours  --  12-16 @ 13:10  Catheterized Catheterized  Enterococcus faecalis  Enterococcus faecalis  ESAU    10,000 - 49,000 CFU/mL Enterococcus faecalis  --  12-16 @ 13:00  .Blood BLOOD  --  --  --    No growth at 72 Hours  --  12-16 @ 08:36  Combi-Cath  --  --  --    Commensal charity consistent with body site  --  12-16 @ 07:45  .Blood BLOOD  --  --  --    No growth at 4 days  --  12-14 @ 03:17  Combi-Cath  --  --  --    Commensal charity consistent with body site  --      RADIOLOGY & ADDITIONAL STUDIES:    < from: Xray Chest 1 View- PORTABLE-Urgent (Xray Chest 1 View- PORTABLE-Urgent .) (12.18.24 @ 16:17) >  IMPRESSION:    Life-support devices as described above.  No focal consolidation.    < end of copied text >

## 2024-12-20 NOTE — CHART NOTE - NSCHARTNOTEFT_GEN_A_CORE
I have seen the patient and reviewed dialysis prescription and flowsheet. Dialysis access is functioning  .   Dialysis prescription has been adjusted for optimized control of volume status, uremia and electrolytes. Management of additional metabolic abnormalities/anemia will continue to be addressed on follow up.

## 2024-12-20 NOTE — PROGRESS NOTE ADULT - SUBJECTIVE AND OBJECTIVE BOX
[facility]-- WOUND TEAM -- FOLLOW UP NOTE  --------------------------------------------------------------------------------    24 hour events/subjective:          Diet:  Diet, NPO with Tube Feed:   Tube Feeding Modality: Nasogastric  Nepro with Carb Steady (NEPRORTH)  Total Volume for 24 Hours (mL): 1320  Continuous  Starting Tube Feed Rate mL per Hour: 55  Until Goal Tube Feed Rate (mL per Hour): 55  Tube Feed Duration (in Hours): 24 (12-16-24 @ 14:04)      ROS: pt unable to offer    ALLERGIES & MEDICATIONS  --------------------------------------------------------------------------------    No Known Allergies      STANDING INPATIENT MEDICATIONS  albumin human  5% IVPB 500 milliLiter(s) IV Intermittent once  albuterol/ipratropium for Nebulization 3 milliLiter(s) Nebulizer every 6 hours  aspirin  chewable 81 milliGRAM(s) Oral daily  buDESOnide    Inhalation Suspension 0.5 milliGRAM(s) Inhalation every 12 hours  caspofungin IVPB 50 milliGRAM(s) IV Intermittent every 24 hours  chlorhexidine 0.12% Liquid 15 milliLiter(s) Oral Mucosa every 12 hours  chlorhexidine 2% Cloths 1 Application(s) Topical <User Schedule>  heparin  Infusion 1400 Unit(s)/Hr IV Continuous <Continuous>  insulin lispro (ADMELOG) corrective regimen sliding scale   SubCutaneous every 6 hours  insulin NPH human recombinant 12 Unit(s) SubCutaneous every 6 hours  meropenem  IVPB 1000 milliGRAM(s) IV Intermittent every 12 hours  methylPREDNISolone sodium succinate Injectable 32 milliGRAM(s) IV Push <User Schedule>  metoprolol tartrate 12.5 milliGRAM(s) Enteral Tube every 8 hours  nystatin Powder 1 Application(s) Topical every 12 hours  oxyCODONE    Solution 2.5 milliGRAM(s) Oral once  pantoprazole  Injectable 40 milliGRAM(s) IV Push daily        VITALS/PHYSICAL EXAM  --------------------------------------------------------------------------------  T(C): 36.9 (12-20-24 @ 03:00), Max: 38.5 (12-19-24 @ 11:00)  HR: 80 (12-20-24 @ 06:00) (80 - 121)  BP: 133/61 (12-20-24 @ 06:00) (78/43 - 156/88)  RR: 15 (12-20-24 @ 06:00) (15 - 31)  SpO2: 100% (12-20-24 @ 06:00) (97% - 100%)  Wt(kg): --              LABS/ CULTURES/ RADIOLOGY:              7.8    7.80  >-----------<  44       [12-20-24 @ 03:03]              23.0     138  |  105  |  121  ----------------------------<  230      [12-19-24 @ 21:02]  4.4   |  14  |  2.74        Ca     7.7     [12-19-24 @ 21:02]      Mg     2.1     [12-19-24 @ 21:02]      Phos  5.7     [12-19-24 @ 21:02]    TPro  4.1  /  Alb  3.2  /  TBili  13.6  /  DBili  x   /  AST  56  /  ALT  64  /  AlkPhos  165  [12-19-24 @ 21:02]    Blood Gas Calcium, Ionized - Venous: 1.10 mmol/L (12-19-24 @ 20:56)  Blood Gas Calcium, Ionized - Venous: 1.05 mmol/L (12-19-24 @ 11:19)  Blood Gas Calcium, Ionized - Venous: 1.04 mmol/L (12-18-24 @ 22:10)  Blood Gas Calcium, Ionized - Venous: 1.06 mmol/L (12-18-24 @ 12:04)  Blood Gas Calcium, Ionized - Venous: 1.07 mmol/L (12-18-24 @ 06:30)      CAPILLARY BLOOD GLUCOSE  POCT Blood Glucose.: 207 mg/dL (20 Dec 2024 05:08)  POCT Blood Glucose.: 251 mg/dL (19 Dec 2024 23:58)  POCT Blood Glucose.: 215 mg/dL (19 Dec 2024 18:31)  POCT Blood Glucose.: 159 mg/dL (19 Dec 2024 11:12)        Culture - Blood (collected 12-16-24 @ 13:00)  Source: .Blood BLOOD  Preliminary Report (12-19-24 @ 18:00):    No growth at 72 Hours    Culture - Blood (collected 12-16-24 @ 07:45)  Source: .Blood BLOOD  Preliminary Report (12-19-24 @ 12:01):    No growth at 72 Hours           [facility]-- WOUND TEAM -- FOLLOW UP NOTE  --------------------------------------------------------------------------------    24 hour events/subjective:    afebrile  intubated/ remains in ICU  tolerating tf  (+)nichols  improved clear weeping drainage from open skin     no odor  OLT   11/15/2024        POD#34  Colectomy 12/7/2024   POD#12  IABP 12/7 removed 12/10  Tracheostomy 12/17/2024 POD#2      Diet:  Diet, NPO with Tube Feed:   Tube Feeding Modality: Nasogastric  Nepro with Carb Steady (NEPRORTH)  Total Volume for 24 Hours (mL): 1320  Continuous  Starting Tube Feed Rate mL per Hour: 55  Until Goal Tube Feed Rate (mL per Hour): 55  Tube Feed Duration (in Hours): 24 (12-16-24 @ 14:04)      ROS: pt unable to offer    ALLERGIES & MEDICATIONS  --------------------------------------------------------------------------------    No Known Allergies      STANDING INPATIENT MEDICATIONS  albumin human  5% IVPB 500 milliLiter(s) IV Intermittent once  albuterol/ipratropium for Nebulization 3 milliLiter(s) Nebulizer every 6 hours  aspirin  chewable 81 milliGRAM(s) Oral daily  buDESOnide    Inhalation Suspension 0.5 milliGRAM(s) Inhalation every 12 hours  caspofungin IVPB 50 milliGRAM(s) IV Intermittent every 24 hours  chlorhexidine 0.12% Liquid 15 milliLiter(s) Oral Mucosa every 12 hours  chlorhexidine 2% Cloths 1 Application(s) Topical <User Schedule>  heparin  Infusion 1400 Unit(s)/Hr IV Continuous <Continuous>  insulin lispro (ADMELOG) corrective regimen sliding scale   SubCutaneous every 6 hours  insulin NPH human recombinant 12 Unit(s) SubCutaneous every 6 hours  meropenem  IVPB 1000 milliGRAM(s) IV Intermittent every 12 hours  methylPREDNISolone sodium succinate Injectable 32 milliGRAM(s) IV Push <User Schedule>  metoprolol tartrate 12.5 milliGRAM(s) Enteral Tube every 8 hours  nystatin Powder 1 Application(s) Topical every 12 hours  oxyCODONE    Solution 2.5 milliGRAM(s) Oral once  pantoprazole  Injectable 40 milliGRAM(s) IV Push daily        VITALS/PHYSICAL EXAM  --------------------------------------------------------------------------------  T(C): 36.9 (12-20-24 @ 03:00), Max: 38.5 (12-19-24 @ 11:00)  HR: 80 (12-20-24 @ 06:00) (80 - 121)  BP: 133/61 (12-20-24 @ 06:00) (78/43 - 156/88)  RR: 15 (12-20-24 @ 06:00) (15 - 31)  SpO2: 100% (12-20-24 @ 06:00) (97% - 100%)  Wt(kg): --    Guarded but stable, WN/ WG/ WD  intubated, sedated, pressors  Progressa  HEENT: NC/AT, sclera clear, mucosa moist, throat clear, trachea midline, neck supple  Respiratory: Nonlabored w/ equal chest rise, vent  Gastrointestinal: soft NT/ND (+)stoma moist, viable, functioning     abdominal incision dressing lifting - staples intact     clear serosanguinous drainage noted     inferior lateral abdominal wall ruptured blister w/ denuded skin      no odor, tenderness, increased warmth, erythema, crepitus, induration, or fluctuance  : (+) nichols, scrotal edema     Scrotum into thighs pink denuded skin         w/  clear yellow serous weeping   no blistering, odor, tenderness, increased warmth, erythema, crepitus, induration, or fluctuance  Neurology: sedated  Psych: difficult to assess  Musculoskeletal:  pROM, no deformities/ contractures  Vascular: BLE edema equal, no palpable DP pulses  Skin: moist w/ good turgor, anasarca   Sacrum/bilateral buttocks unstageable pressure injury   denuded / partial thickness skin loss w/ soft black eschar w/ hyperpigmented skin in periwound   6.0cm x 4.0cm x 0.1cm, scant serosanguinous drainage, periwound erythema   scant yellow clear weeping noted on attends pad  no blistering, odor, tenderness, increased warmth, erythema, crepitus, induration, or fluctuance            LABS/ CULTURES/ RADIOLOGY:              7.8    7.80  >-----------<  44       [12-20-24 @ 03:03]              23.0     138  |  105  |  121  ----------------------------<  230      [12-19-24 @ 21:02]  4.4   |  14  |  2.74        Ca     7.7     [12-19-24 @ 21:02]      Mg     2.1     [12-19-24 @ 21:02]      Phos  5.7     [12-19-24 @ 21:02]    TPro  4.1  /  Alb  3.2  /  TBili  13.6  /  DBili  x   /  AST  56  /  ALT  64  /  AlkPhos  165  [12-19-24 @ 21:02]    Blood Gas Calcium, Ionized - Venous: 1.10 mmol/L (12-19-24 @ 20:56)  Blood Gas Calcium, Ionized - Venous: 1.05 mmol/L (12-19-24 @ 11:19)  Blood Gas Calcium, Ionized - Venous: 1.04 mmol/L (12-18-24 @ 22:10)  Blood Gas Calcium, Ionized - Venous: 1.06 mmol/L (12-18-24 @ 12:04)  Blood Gas Calcium, Ionized - Venous: 1.07 mmol/L (12-18-24 @ 06:30)      CAPILLARY BLOOD GLUCOSE  POCT Blood Glucose.: 207 mg/dL (20 Dec 2024 05:08)  POCT Blood Glucose.: 251 mg/dL (19 Dec 2024 23:58)  POCT Blood Glucose.: 215 mg/dL (19 Dec 2024 18:31)  POCT Blood Glucose.: 159 mg/dL (19 Dec 2024 11:12)        Culture - Blood (collected 12-16-24 @ 13:00)  Source: .Blood BLOOD  Preliminary Report (12-19-24 @ 18:00):    No growth at 72 Hours    Culture - Blood (collected 12-16-24 @ 07:45)  Source: .Blood BLOOD  Preliminary Report (12-19-24 @ 12:01):    No growth at 72 Hours

## 2024-12-20 NOTE — PROGRESS NOTE ADULT - ASSESSMENT
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 Post-op course c/b worsening mental status and acute hypoxic respiratory failure requiring multiple intubations/extubations, currently extubated (as of 11/26/24) and colonic ileus s/p several rounds of Relistor and Neostigmine with NGT and rectal tube currently in place now with septic shock of unclear etiology. Transplant nephrology consulted for metabolic acidosis and MORAIMA.    1. s/p OLT 11/15/24 with oliguric MORAIMA in setting of septic shock.  - Likely hemodynamically mediated in setting of cardiogenic and septic shock on multiple vasopressors and prior IABP.   - SCr on admission was 0.48 11/15/24. SCr elevated at 3.01 and BUN elevated to 123. Pt. is non-oliguric, UOP today is 530 cc in 24 hours  - Urinalysis trace ketones, protein 30, 17 casts.   - CT AP non-con: Bilateral renal cysts including cysts with peripheral calcification in the left kidney.  - Pt. initiated on CRRT 12/7/24- 12/15/24 at 1AM stopped. s/p iHD 12/18/24 however required levophed.   - Pt. will require access for HD today. Discussed with SICU team. Plan for HD today after catheter placement.   - Electrolyte, labs, and volume status reviewed. Worsening uremia, will dialyze iHD today.      2. Metabolic Acidosis   - AGMA in setting of septic shock, starvation ketosis, lactic acidosis and MORAIMA.  - SCO2 worsening to 15 today. Will do iHD today.     If you have any questions, please feel free to contact me:  Magaly Tello MD PGY-4  Nephrology Fellow  [employer] Teams (Preferred)/ Pager [phone]   (After 5pm or on weekends please page the on-call fellow

## 2024-12-20 NOTE — PROGRESS NOTE ADULT - ASSESSMENT
74M retired Urologist PMH DM, HTN, pAfib s/p ablation 2018 (no AC 2/2 thrombocytopenia), CAD, depression, anxiety, BPH, 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   s/p OLT 11/15 with complicated post op course    [] Septic shock/Cardiogenic shock  [] s/p Colectomy 12/7 POD# 12  [] RTOR for closure and Ileostomy creation   [] IAPB 12/7- removed 12/10: CTICU on board     Wound Consult requested to assist w/ management of  Sacral/ buttocks unstageable pressure injury   Moisture Associated Dermatitis scrotum/ Buttocks/ Thighs  Scrotal Edema  Ascites       Buttocks/ Sacrum/ Scrotum / thighs  TRIAD BID and prn soiling    Scrotal elevation w/ folded attends pad  surgical wound management as per surgical team    Continue w/ attends under pads and Pericare w/ Castro & Ostomy care as per protocol  Continue turning and positioning w/ offloading assistive devices as per protocol  cosnider NOEMI/PVR's if in line with GOC   Waffle Cushion to chair when oob to chair  Continue w/ alternating air with low air loss surface pressure redistribution bed surface   Moisturize intact skin w/ SWEEN cream BID  Appreciate nutrition consult - pt w/ Increased protein-energy needs and Acute Severe protein calorie malnutrition       multivitamin, vitamin C to assist w/ wound healing       diet pending as pt post op- Consider CAMI to promote wound healing when post op care allows  Care as per CTS, will follow w/ you  Upon discharge f/u as outpatient at [facility] [address] [place] [address] [phone]  Seen &  D/w attng, team & RN  Thank you for this consult  Nights/ Weekends/ Holidays please call:  General Surgery Consult pager ([phone]) for emergencies  Wound PT for multilayer leg wrapping or VAC issues (x [phone])  I spent  35 minutes face to face w/ this pt of which more than 50% of the time was spent counseling & coordinating care of this pt.

## 2024-12-20 NOTE — CHART NOTE - NSCHARTNOTEFT_GEN_A_CORE
Called to bedside for narrow complex tachycardia to 130s with ST depressions laterally and RONNELL in II that lasted 4 minutes before spontaneously converting. Telemetry reviewed and showed that a PVC triggered the episode. Repeat ECG following event was back to baseline, similar to priors. During event, patient reportedly was alert and seemingly in pain and then as soon as event ended, he went back to sleep. Discussed with interventionalist Dr. Jenkins, his inpatient cardiologist Dr. Quintanilla, and liver transplant team. Suspect this may have been coronary vasospasms but due to multiple risks factors, will treat medically as ACS for now but have to be considerate of thrombocytopenia (plts 58) and Hgb recently at 7 (s/p 1 unit pRBC today with improvement to 8.9).     Troponin noted to be 195 and multidisciplinary discussion and discussion with son ended in agreement with plan to start heparin gtt (without bolus) and Aspirin 81 mg daily as a compromise given his anemia and thrombocytopenia. Plan to trend troponins and possible LHC tomorrow depending on clinical course.

## 2024-12-20 NOTE — ADVANCED PRACTICE NURSE CONSULT - RECOMMEDATIONS
Will recommend:  1. Monitor output.  2. Empty pouch when 1/3-1/2 full   3. Change pouching system every 3-4 days & prn leakage  4. Contact ostomy specialists if questions, concerns/issues .  5. Supplies: [place] 2 1/4" Ceraplus flat skin barrier (#59210), [place]ian[place] 2 1/4" drainable pouch (#[id number]); Accessory products:  stoma paste #[id number], stoma powder (#[id number]) & Cavilon No sting barrier film wipe (#8868)  Will f/u for ostomy education when appropriate. Pt remains acute requiring SICU care.

## 2024-12-20 NOTE — PROGRESS NOTE ADULT - ASSESSMENT
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 Patient's post-op course has been c/b multiple reintubation, total colectomy w/ ileostomy, and IABP placement 2/2 cardiogenic shock with subsequent removal. S/p bedside tracheostomy 12/17.    Neuro:  - Follows commands, off sedation  - pain control w/ oxycodone  - CT head 11/19, 11/21, 11/25, 11/29, 12/5 negative  - EEG: Mild diffuse cerebral dysfunction that is not specific in etiology. No epileptic discharges recorded. No seizures recorded.  - Holding home xanax, Abilify, Zoloft, Remeron, Lexapro     Resp:  - S/p bedside tracheostomy 12/17  -PRVC 14/500/5/30  -SBT daily  -c/w inhaled bronchodilator  -VBG daily    CV:  - Hep gtt and ASA started 12/19 on for possible ACS event  - Troponin 195, trend q3  -IABP removed 12/10  -dobutamine gtt d/c'ed 12/15  -s/p amiodarone gtt  - Metoprolol 50 q8hr  -trend lactate  -hold home metoprolol  -TTE 12/11 shows EF of 55-60%    GI:  -trend LFTs  -NPO w/ tube feeds (held for trach)  -KO tube post pyloric for feeds  -protonix QD  -high output from ALYSSA drains, continue to monitor quantity & quality    /Renal:  -off CRRT since 12/15  -Monitor BUN/Cr  -I&Os, trend UOP  -elevate scrotum i/s/o edema   -Scrotal US 12/15 -> edema, no abscess    Heme:  -trend H/H  -monitor coags    ID:  -ABx transition to jeniffer, caspo (12/16-)  -monitor WBC, fever curve  -CMV PPx w/ ganciclovir (holding now iso thrombocytopenia)  -holding cellcept, cyclosporine  -f/u cultures from 12/16  -UA positive 12/16    Endo:  -monitor glucose   - Solu-cortef 32qD  -NPH 8 q6    Lines:   -KO tube, remove NGT  -nichols exchanged 12/16  -ALYSSA x 2

## 2024-12-20 NOTE — PROGRESS NOTE ADULT - SUBJECTIVE AND OBJECTIVE BOX
SICU Daily Progress Note  =====================================================  Interval/Overnight Events:     - Narrow complex tachycardia to 130s with ST changes, spontaneously converted, EKG after episode showing posterior MI and lateral ST depression  - Cards consulted -> possible vasopasm? will treat as ACS with hep gtt and ASA  - Troponin 195, trend q3  - NPH 12u q6  - 100.6 febrile, workup sent  - Started SQH  - DC left IJ and right IJ shiley  - Levo restarted, metoprolol decreased to 25 q8  - 1 U PRBC for hgb 7.1  - Cyclosporine x2      Allergies: No Known Allergies      MEDICATIONS:   --------------------------------------------------------------------------------------  Neurologic Medications    Respiratory Medications  albuterol/ipratropium for Nebulization 3 milliLiter(s) Nebulizer every 6 hours  buDESOnide    Inhalation Suspension 0.5 milliGRAM(s) Inhalation every 12 hours    Cardiovascular Medications  metoprolol tartrate 12.5 milliGRAM(s) Enteral Tube every 8 hours    Gastrointestinal Medications  albumin human  5% IVPB 500 milliLiter(s) IV Intermittent once  pantoprazole  Injectable 40 milliGRAM(s) IV Push daily    Genitourinary Medications    Hematologic/Oncologic Medications  aspirin  chewable 81 milliGRAM(s) Oral daily  heparin  Infusion 1400 Unit(s)/Hr IV Continuous <Continuous>    Antimicrobial/Immunologic Medications  caspofungin IVPB 50 milliGRAM(s) IV Intermittent every 24 hours  caspofungin IVPB      meropenem  IVPB 1000 milliGRAM(s) IV Intermittent every 12 hours    Endocrine/Metabolic Medications  insulin lispro (ADMELOG) corrective regimen sliding scale   SubCutaneous every 6 hours  insulin NPH human recombinant 12 Unit(s) SubCutaneous every 6 hours  methylPREDNISolone sodium succinate Injectable 32 milliGRAM(s) IV Push <User Schedule>    Topical/Other Medications  chlorhexidine 0.12% Liquid 15 milliLiter(s) Oral Mucosa every 12 hours  chlorhexidine 2% Cloths 1 Application(s) Topical <User Schedule>  nystatin Powder 1 Application(s) Topical every 12 hours    --------------------------------------------------------------------------------------    VITAL SIGNS, INS/OUTS (last 24 hours):  --------------------------------------------------------------------------------------  T(C): 37.5 (12-19-24 @ 23:00), Max: 38.5 (12-19-24 @ 11:00)  HR: 81 (12-20-24 @ 00:00) (81 - 121)  BP: 97/55 (12-20-24 @ 00:00) (78/43 - 156/88)  RR: 18 (12-20-24 @ 00:00) (18 - 38)  SpO2: 100% (12-20-24 @ 00:00) (98% - 100%)    12-18-24 @ 07:01  -  12-19-24 @ 07:00  --------------------------------------------------------  IN: 2906.3 mL / OUT: 2865 mL / NET: 41.3 mL    12-19-24 @ 07:01  -  12-20-24 @ 01:24  --------------------------------------------------------  IN: 3564.7 mL / OUT: 2655 mL / NET: 909.7 mL      --------------------------------------------------------------------------------------    EXAM  NEUROLOGY  Exam: NAD, alert, nods to questions    HEENT  Exam: Trach in place, clean/dry    RESPIRATORY  Exam: Normal expansion/effort., [facility] 14/500/5/30    CARDIOVASCULAR  Exam: Regular rate and rhythm, sinus HR in 80s    VASCULAR  Exam: Extremities warm, pink, well-perfused.       LABS  --------------------------------------------------------------------------------------                        8.9    11.41 )-----------( 58       ( 19 Dec 2024 21:02 )             26.3   12-19    138  |  105  |  121[H]  ----------------------------<  230[H]  4.4   |  14[L]  |  2.74[H]    Ca    7.7[L]      19 Dec 2024 21:02  Phos  5.7     12-19  Mg     2.1     12-19    TPro  4.1[L]  /  Alb  3.2[L]  /  TBili  13.6[H]  /  DBili  x   /  AST  56[H]  /  ALT  64[H]  /  AlkPhos  165[H]  12-19  Urinalysis Basic - ( 19 Dec 2024 21:02 )    Color: x / Appearance: x / SG: x / pH: x  Gluc: 230 mg/dL / Ketone: x  / Bili: x / Urobili: x   Blood: x / Protein: x / Nitrite: x   Leuk Esterase: x / RBC: x / WBC x   Sq Epi: x / Non Sq Epi: x / Bacteria: x    PT/INR - ( 19 Dec 2024 21:02 )   PT: 18.0 sec;   INR: 1.57 ratio         PTT - ( 19 Dec 2024 21:02 )  PTT:51.0 sec  --------------------------------------------------------------------------------------

## 2024-12-20 NOTE — PROGRESS NOTE ADULT - ASSESSMENT
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   s/p OLT 11/15 with complicated post op course    [] Septic shock/Cardiogenic shock  [] s/p Colectomy 12/7 POD# 12  [] RTOR for closure and Ileostomy creation   [] IAPB 12/7- removed 12/10: CTICU on board  - E coli Bacteremia (12/6) - on jeniffer/caspo -> switched to  cefepime/fluc. Completed 7days of emperic flagyl.  Received Tobra x 1 12/6 .   - c/w Jeniffer/Caspo  - f/u with ID re: tracheal cultures  - Neutropenia: received Neupogen 480mcg x2  - MORAIMA: CRRT started 12/7, stopped 12/15  - AMS; CT Head neg  - scrotal edema: repeat scrotal us; neg for abscess  - Hold diuretics   - f/u urine Cx  - BCx (12/16): no growth to date and UA (12/16) +nitrite and leukocyte esterase   - On meropenem and caspofungin as per ID  - Vitamin K x 3 days 12/16-12/19  - S/P tracheotomy 12/17    [] s/p OLT POD #34  - trend LFT's  - Diet: increase TFs as needed  - Pain management: avoid narcotics  - Strict I&Os, nichols, wound vac placed 12/10   - US: L brachial DVT   - wound vac exchange for ileostomy (12/13)  - HIT panel neg (12/14)  - 12/19 febrile to 100.6, recultured (tracheal aspirate showing GNR and yeast like cells)    [ ] Immunosuppression  - Tacrolimus stopped 11/18 in setting of AMS/Seizure, Started Cyclo 12/17  - Cyclo by level, MMF held, Solumedrol 32 mg daily  - PPx: Gancyclovir (HELD), bactrim (HELD) in setting of thrombocytopenia    [] lleus   -@ home on Linzess  - imaging with distended colon (likely opioid induced)  - s/p Neostigmine x 2  - s/p Relistor, last dose 12/1  - s/p colectomy with end ileostomy  (see above)  - Daily AXR     [] CMV viremia  - d/c gancylovir due to thrombocytopenia  - CMV PCR (12/11 and 12/16): neg    [ ] AMS  - Reintubated 11/20 Aspiration/hypoxia, extubated 11/24->[id number]/24--> extubated 11/26 -> [id number]/7 -> tracheostomy 12/17  - EEG 11/30 negative, Neurology following  -  following   - off tacro  - on keppra    [ ] HTN/ pAFib  [ ] coronary vasospasm vs posterior wall MI  - possible LHC 12/20, f/u cardiology  - on hep gtt, goal PTT 50-60  - ASA  - trend trops/CKMB q3  - Off Amiodarone, off dobutamine  - metoprolol restarted due to possible ACS  - Eliquis 5mg bid - held 12/6.   - Dr. 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   s/p OLT 11/15 with complicated post op course    [] Septic shock/Cardiogenic shock  [] s/p Colectomy 12/7 POD# 12  [] RTOR for closure and Ileostomy creation   [] IAPB 12/7- removed 12/10: CTICU on board  - E coli Bacteremia (12/6) - on jeniffer/caspo -> switched to  cefepime/fluc. Completed 7days of emperic flagyl.  Received Tobra x 1 12/6 .   - c/w Jeniffer/Caspo  - f/u with ID re: tracheal cultures  - Neutropenia: received Neupogen 480mcg x2  - MORAIMA: CRRT started 12/7, stopped 12/15  - AMS; CT Head neg  - scrotal edema: repeat scrotal us; neg for abscess  - Hold diuretics   - f/u urine Cx  - BCx (12/16): no growth to date and UA (12/16) +nitrite and leukocyte esterase   - On meropenem and caspofungin as per ID  - Vitamin K x 3 days 12/16-12/19  - S/P tracheotomy 12/17    [] s/p OLT POD #34  - trend LFT's  - Diet: increase TFs as needed  - Pain management: avoid narcotics  - Strict I&Os, nichols, wound vac placed 12/10   - US: L brachial DVT   - wound vac exchange for ileostomy (12/13)  - HIT panel neg (12/14)  - 12/19 febrile to 100.6, recultured (tracheal aspirate showing GNR and yeast like cells)    [ ] Immunosuppression  - Tacrolimus stopped 11/18 in setting of AMS/Seizure, Started Cyclo 12/17  - Cyclo by level, MMF held, Solumedrol 32 mg daily  - PPx: Gancyclovir (HELD), bactrim (HELD) in setting of thrombocytopenia    [] lleus   -@ home on Linzess  - imaging with distended colon (likely opioid induced)  - s/p Neostigmine x 2  - s/p Relistor, last dose 12/1  - s/p colectomy with end ileostomy  (see above)  - Daily AXR     [] CMV viremia  - d/c gancylovir due to thrombocytopenia  - CMV PCR (12/11 and 12/16): neg    [ ] AMS  - Reintubated 11/20 Aspiration/hypoxia, extubated 11/24->zxteoztsmaz92/24--> extubated 11/26 -> [id number]/7 -> tracheostomy 12/17  - EEG 11/30 negative, Neurology following  -  following   - off tacro  - on keppra    [ ] HTN/ pAFib  [ ] coronary vasospasm vs posterior wall MI  - possible LHC 12/20, f/u cardiology  - on hep gtt, goal PTT 50-60  - ASA  - trend trops/CKMB q3  - Off Amiodarone, off dobutamine  - metoprolol restarted due to possible ACS  - Eliquis 5mg bid - held 12/6.   - Dr. 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   s/p OLT 11/15 with complicated post op course    [] Septic shock/Cardiogenic shock  [] s/p Colectomy 12/7 POD# 12  [] RTOR for closure and Ileostomy creation   [] IAPB 12/7- removed 12/10: CTICU on board  - E coli Bacteremia (12/6) - on jeniffer/caspo -> switched to  cefepime/fluc. Completed 7days of empiric flagyl.  Received Tobra x 1 12/6 .   - Tx ID following - c/w Jeniffer/Caspo  - Neutropenia: received Neupogen 480mcg x2  - MORAIMA: CRRT started 12/7, stopped 12/15, iHD today   - AMS; CT Head neg  - scrotal edema: repeat scrotal us; neg for abscess  - Hold diuretics   - BCx (12/16): no growth to date and UA (12/16) +nitrite and leukocyte esterase   - On meropenem and caspofungin as per ID  - Vitamin K x 3 days 12/16-12/19  - S/P tracheotomy 12/17    [] s/p OLT POD #35  - trend LFT's  - Diet: increase TFs as needed  - Pain management: avoid narcotics  - Strict I&Os, nichols, wound vac placed 12/10   - US: L brachial DVT   - wound vac exchange for ileostomy (12/13)  - HIT panel neg (12/14)  - 12/19 febrile to 100.6, recultured (tracheal aspirate showing GNR and yeast like cells)    [ ] Immunosuppression  - Tacrolimus stopped 11/18 in setting of AMS/Seizure, Started Cyclo 12/17  - Cyclo by level, MMF held, Solumedrol 32 mg daily  - PPx: Gancyclovir (HELD), bactrim (HELD) in setting of thrombocytopenia    [] lleus   -@ home on Linzess  - imaging with distended colon (likely opioid induced)  - s/p Neostigmine x 2  - s/p Relistor, last dose 12/1  - s/p colectomy with end ileostomy  (see above)  - Daily AXR     [] CMV viremia  - d/c gancylovir due to thrombocytopenia  - CMV PCR (12/11 and 12/16): neg    [ ] AMS  - Reintubated 11/20 Aspiration/hypoxia, extubated 11/24->[id number]/24--> extubated 11/26 -> [id number]/7 -> tracheostomy 12/17  - EEG 11/30 negative, Neurology following  - BH following   - off tacro  - on keppra    [ ] HTN/ pAFib  [ ] coronary vasospasm vs posterior wall MI  - on hep gtt, goal PTT 50-60  - ASA  - Cards- plan for PCI prior to DC   - Off Amiodarone, off dobutamine  - c/w metoprolol  - Eliquis 5mg bid - held 12/6.   - Dr. Quintanilla following    [ ] DM  - ISS     []Scrotal abscess   - US (12/12): 2.1 x0.9 x 3.2 cm focal, right testicle- possible abscess (12/12)  - Urology c/s recs nothing to do, repeat ultrasound   - 12/15 CT CAP no acute changes

## 2024-12-20 NOTE — PROGRESS NOTE ADULT - CRITICAL CARE ATTENDING COMMENT
Patient evaluated by me on AM rounds.     Current management as follows.   #NEURO: Intermittently follows commands.   ---CTH, EEG negative.   #RESP: s/p Trach 12/17. Continues to require ventilator support. Tolerated 2 hours 12/19.   #CVS: Epidose of narrow complex tachycardia overnight for approximately 4 mins and patrient appeared to be in discomfort. Lasbd concerning for elevated trops. Patient evaluated by cardiology who concerniend for coronoary vasoprasm. Recommned ASA ad Heprain fro ACS. .Trend Trops and follow up Cards. Repeat ECHO.   #GI: Tolerating post pyloric tube feeds.   ---Ostomy output 900cc.   ---Abdominal drains with 1.6L over 24 hours. Replace output 0.5 : 1 with albumin if more than 1L.  #: Tolerated HD 12/19. UOP 515cc/24 hrs. Net positive 694cc/24 hours.   ---Place R IJ Shiley. Plan for HD today.   #HEME: Plt 58 ---> 44. Trend.   ---Continue Heparin drip and ASA.   #ID: Afebrile WBC 11.   ---12/16 Urine cx: E. Faecalis.   ---Combicath GNRs and yeastt   ---Appreciate ID recs. Continue Meropenem & Caspo. Add Linezolid. Plan for CT AP today.   ---DC L IJ Intro after obtaining peripherals. DC Babatunde for at least 24 hour line holiday.   ---Combicath 12/19: Numerous GNRs.   #ENDO: Steroids as per tranplants. Hyperglycemic to 200s. Titrate up NPH and continue ISS.     Lines:   -Castro 12/16. Patient evaluated by me on AM rounds.     Current management as follows.   #NEURO: Intermittently follows commands.   ---CTH, EEG negative.   #RESP: s/p Trach 12/17. Continues to require ventilator support. Tolerated 2 hours PSV 12/19.   #CVS: Episode of narrow complex tachycardia overnight for approximately 4 mins. Labs concerning for elevated trops. Patient evaluated by cardiology with concern for coronory vasospasm. Recommended ASA and Heparin drip for ACS. Will trend Trops and follow up Cards. Repeat ECHO.   #GI: Tolerating post pyloric tube feeds.   ---Ostomy output 900cc.   ---Abdominal drains with 1.6L over 24 hours. Replace output 0.5 : 1 with albumin if more than 1L.  #: Tolerated HD 12/19. UOP 515cc/24 hrs. Net positive 694cc/24 hours.   ---Place DENNISE Fine. Plan for HD today.   #HEME: Plt 58 ---> 44. Trend.   ---Continue Heparin drip and ASA.   #ID: Afebrile, WBC 11.   ---12/16 Urine cx: E. Faecalis.   ---Combi cath 12/19 with GNRs and yeast   ---Appreciate ID recs. Continue Meropenem & Caspo. Add Linezolid. Plan for CT Chest AP today.   #ENDO: Steroids as per transplant. Hyperglycemic to 200s. Titrate up NPH and continue ISS.     Lines:   -Castro 12/16.

## 2024-12-20 NOTE — PROGRESS NOTE ADULT - SUBJECTIVE AND OBJECTIVE BOX
HPI:  Patient seen and examined at bedside in SICU.    Review Of Systems:      Unable to assess    Medications:  albumin human  5% IVPB 500 milliLiter(s) IV Intermittent once  albuterol/ipratropium for Nebulization 3 milliLiter(s) Nebulizer every 6 hours  aspirin  chewable 81 milliGRAM(s) Oral daily  buDESOnide    Inhalation Suspension 0.5 milliGRAM(s) Inhalation every 12 hours  caspofungin IVPB      caspofungin IVPB 50 milliGRAM(s) IV Intermittent every 24 hours  chlorhexidine 0.12% Liquid 15 milliLiter(s) Oral Mucosa every 12 hours  chlorhexidine 2% Cloths 1 Application(s) Topical <User Schedule>  heparin  Infusion 1000 Unit(s)/Hr IV Continuous <Continuous>  insulin lispro (ADMELOG) corrective regimen sliding scale   SubCutaneous every 6 hours  insulin NPH human recombinant 15 Unit(s) SubCutaneous every 6 hours  linezolid  IVPB 600 milliGRAM(s) IV Intermittent once  linezolid  IVPB 600 milliGRAM(s) IV Intermittent every 12 hours  linezolid  IVPB      meropenem  IVPB 1000 milliGRAM(s) IV Intermittent every 12 hours  methylPREDNISolone sodium succinate Injectable 32 milliGRAM(s) IV Push <User Schedule>  metoprolol tartrate 12.5 milliGRAM(s) Enteral Tube every 8 hours  nystatin Powder 1 Application(s) Topical every 12 hours  oxyCODONE    Solution 2.5 milliGRAM(s) Oral once  pantoprazole  Injectable 40 milliGRAM(s) IV Push daily    PAST MEDICAL & SURGICAL HISTORY:  Diabetes      Transaminitis      Paroxysmal atrial fibrillation      Depression      BPH (benign prostatic hyperplasia)      Hypertension      Chronic atrial fibrillation      Coronary artery disease      Hepatocellular carcinoma      DM (diabetes mellitus)      HTN (hypertension)      Paroxysmal atrial fibrillation      Cirrhosis      HCC (hepatocellular carcinoma)      History of BPH      History of laparoscopic cholecystectomy      History of lumbar laminectomy      H/O prior ablation treatment      H/O percutaneous left heart catheterization        Vitals:  T(C): 37 (12-20-24 @ 11:00), Max: 38.1 (12-19-24 @ 15:00)  HR: 83 (12-20-24 @ 11:07) (79 - 121)  BP: 126/73 (12-20-24 @ 11:00) (96/53 - 140/64)  BP(mean): 92 (12-20-24 @ 11:00) (70 - 92)  RR: 14 (12-20-24 @ 11:00) (14 - 31)  SpO2: 99% (12-20-24 @ 11:07) (97% - 100%)  Wt(kg): --  Daily     Daily   I&O's Summary    19 Dec 2024 07:01  -  20 Dec 2024 07:00  --------------------------------------------------------  IN: 4058.7 mL / OUT: 3365 mL / NET: 693.7 mL    20 Dec 2024 07:01  -  20 Dec 2024 12:23  --------------------------------------------------------  IN: 258.5 mL / OUT: 175 mL / NET: 83.5 mL        Physical Exam:  Appearance: Ventilated via trach, encephalopathic  Eyes: PERRL, EOMI, pink conjunctiva  HENT: +trach, +NGT  Cardiovascular: RRR, S1, S2, no murmurs, rubs, or gallops; no edema; no JVD  Respiratory: Clear to auscultation bilaterally  Gastrointestinal: soft, non-tender, non-distended with normal bowel sounds  Musculoskeletal: No clubbing; no joint deformity   Skin: Sacral wound                          7.7    7.70  )-----------( 44       ( 20 Dec 2024 09:05 )             22.6     12-20    138  |  103  |  123[H]  ----------------------------<  201[H]  4.4   |  15[L]  |  3.01[H]    Ca    7.7[L]      20 Dec 2024 09:05  Phos  6.0     12-20  Mg     2.6     12-20    TPro  4.0[L]  /  Alb  3.1[L]  /  TBili  10.5[H]  /  DBili  x   /  AST  52[H]  /  ALT  57[H]  /  AlkPhos  130[H]  12-20    PT/INR - ( 19 Dec 2024 21:02 )   PT: 18.0 sec;   INR: 1.57 ratio         PTT - ( 20 Dec 2024 09:05 )  PTT:139.4 sec  CARDIAC MARKERS ( 20 Dec 2024 09:05 )  x     / x     / x     / x     / 7.5 ng/mL  CARDIAC MARKERS ( 20 Dec 2024 06:24 )  x     / x     / x     / x     / 7.6 ng/mL  CARDIAC MARKERS ( 20 Dec 2024 03:03 )  x     / x     / x     / x     / 6.3 ng/mL  CARDIAC MARKERS ( 20 Dec 2024 00:25 )  x     / x     / x     / x     / 6.6 ng/mL  CARDIAC MARKERS ( 19 Dec 2024 21:04 )  x     / x     / x     / x     / 6.4 ng/mL      Cardiovascular Diagnostic Testing:  ECG: sinus rhythm    Echo: < from: Intra-Operative Transesophageal Echo W or WO Ultrasound Enhancing Agent (11.15.24 @ 07:46) >  --------------------------------------------------------------------------------  PRE-BYPASS FINDINGS     Left Ventricle:  Left ventricular ejection fraction is estimated at 60 to 65%. Normal left ventricularwall thickness. The left ventricular systolic function is normal There are no regional wall motion abnormalities seen.     Right Ventricle:  The right ventricular cavity is normal in size, with normal wall thickness and right ventricular systolic function is normal. Right ventricular systolic function is normal.     Left Atrium:  The left atrium is normal in size. No thrombus visualized in left atrial appendage.     Right Atrium:  The right atrium is normal in size. The right atrium is normal in size.     Interatrial Septum:  No PFO visualized with color flow doppler.     Aortic Valve:  The aortic valve appears trileaflet. There is no aortic valve stenosis. There is trace aortic regurgitation.     Mitral Valve:  There is no mitral valve stenosis. There is no mitral valve stenosis. There is trace mitral regurgitation.     Tricuspid Valve:  There is no evidence of tricuspid stenosis. There is trace tricuspid regurgitation.     Pericardium:  No pericardial effusion seen.     Post-Bypass:  S/P OLT. Under current loading conditions, hyperdynamic left ventricular systolic function, EF: 70-75% by visual estimate, no RWMA. Normal right ventricular systolic function. All other findings unchanged. Probe removed atraumatically, no blood. The patient tolerated the procedure well and without complications. Permanent recorded images are stored in the medical record.     Electronically signed by James Villareal on 11/15/2024 at 2:18:16 PM         *** Final ***    < end of copied text >      < from: TTE Limited W or WO Ultrasound Enhancing Agent (12.11.24 @ 09:28) >  _______________________________________________________________________________________     CONCLUSIONS:      1. Endocardial visualization enhanced with Definity (Ultrasound Enhancing Agent).   2. Left ventricular systolic function is normal with an ejection fraction visually estimated at 55 to 60 %.   3. Enlarged right ventricular cavity size and mildly reduced right ventricular systolic function.   4. Device lead is visualized in the right heart.    ________________________________________________________________________________________    < end of copied text >      Stress Testing:     Cath: 4/24/2024, patient had his LHC repeated with Ben Villareal MD at [facility].  Cath showed multivessel coronary artery disease, but the lesions previously noted were FFR negative.  He continues to have MIRTA 3 flow throughout.    Interpretation of Telemetry: Sinus     Imaging:

## 2024-12-20 NOTE — PROGRESS NOTE ADULT - SUBJECTIVE AND OBJECTIVE BOX
[facility] DIVISION OF KIDNEY DISEASES AND HYPERTENSION -- FOLLOW UP NOTE  --------------------------------------------------------------------------------  Chief Complaint:  s/p OLT 11/15/24 with oliguric MORAIMA in setting of cardiogenic/septic shock.    24 hour events/subjective: Pt. with episode of NCT overnight with elevated troponin.  Pt. seen and examined at bedside earlier today. Remains trach/peg.         PAST HISTORY  --------------------------------------------------------------------------------  No significant changes to PMH, PSH, FHx, SHx, unless otherwise noted    ALLERGIES & MEDICATIONS  --------------------------------------------------------------------------------  Allergies    No Known Allergies    Intolerances      Standing Inpatient Medications  albumin human  5% IVPB 500 milliLiter(s) IV Intermittent once  albuterol/ipratropium for Nebulization 3 milliLiter(s) Nebulizer every 6 hours  aspirin  chewable 81 milliGRAM(s) Oral daily  buDESOnide    Inhalation Suspension 0.5 milliGRAM(s) Inhalation every 12 hours  caspofungin IVPB      caspofungin IVPB 50 milliGRAM(s) IV Intermittent every 24 hours  chlorhexidine 0.12% Liquid 15 milliLiter(s) Oral Mucosa every 12 hours  chlorhexidine 2% Cloths 1 Application(s) Topical <User Schedule>  cycloSPORINE  , modified (GENGRAF) Solution 25 milliGRAM(s) Oral <User Schedule>  cycloSPORINE  , modified (GENGRAF) Solution 25 milliGRAM(s) Oral once  heparin  Infusion 1000 Unit(s)/Hr IV Continuous <Continuous>  insulin lispro (ADMELOG) corrective regimen sliding scale   SubCutaneous every 6 hours  insulin NPH human recombinant 15 Unit(s) SubCutaneous every 6 hours  linezolid  IVPB 600 milliGRAM(s) IV Intermittent once  linezolid  IVPB 600 milliGRAM(s) IV Intermittent every 12 hours  linezolid  IVPB      meropenem  IVPB 1000 milliGRAM(s) IV Intermittent every 12 hours  methylPREDNISolone sodium succinate Injectable 32 milliGRAM(s) IV Push <User Schedule>  metoprolol tartrate 12.5 milliGRAM(s) Enteral Tube every 8 hours  nystatin Powder 1 Application(s) Topical every 12 hours  oxyCODONE    Solution 2.5 milliGRAM(s) Oral once  pantoprazole  Injectable 40 milliGRAM(s) IV Push daily    PRN Inpatient Medications      REVIEW OF SYSTEMS  --------------------------------------------------------------------------------  Unable to obtain ROS due to current clinical status.     VITALS/PHYSICAL EXAM  --------------------------------------------------------------------------------  T(C): 37 (12-20-24 @ 11:00), Max: 38.1 (12-19-24 @ 15:00)  HR: 87 (12-20-24 @ 12:00) (79 - 121)  BP: 109/57 (12-20-24 @ 12:00) (97/55 - 140/64)  RR: 22 (12-20-24 @ 12:00) (14 - 31)  SpO2: 99% (12-20-24 @ 12:00) (97% - 100%)  Wt(kg): --        12-19-24 @ 07:01  -  12-20-24 @ 07:00  --------------------------------------------------------  IN: 4058.7 mL / OUT: 3365 mL / NET: 693.7 mL    12-20-24 @ 07:01  -  12-20-24 @ 14:17  --------------------------------------------------------  IN: 388.5 mL / OUT: 510 mL / NET: -121.5 mL    Physical Exam:  Gen: +trach opens eyes.   Pulm: +trach  CV: RRR, S1S2+  Abd: +BS, soft +colostomy +abdominal incision c/d/i.  MSK: 1+ edema throughout up to scrotum.   Psych: Normal affect and mood  Skin: Warm  Access: RIJ non-tunneled HD catheter.     LABS/STUDIES  --------------------------------------------------------------------------------              7.7    7.70  >-----------<  44       [12-20-24 @ 09:05]              22.6     138  |  103  |  123  ----------------------------<  201      [12-20-24 @ 09:05]  4.4   |  15  |  3.01        Ca     7.7     [12-20-24 @ 09:05]      Mg     2.6     [12-20-24 @ 09:05]      Phos  6.0     [12-20-24 @ 09:05]    TPro  4.0  /  Alb  3.1  /  TBili  10.5  /  DBili  x   /  AST  52  /  ALT  57  /  AlkPhos  130  [12-20-24 @ 09:05]    PT/INR: PT 18.0 , INR 1.57       [12-19-24 @ 21:02]  PTT: 139.4      [12-20-24 @ 09:05]      Creatinine Trend:  SCr 3.01 [12-20 @ 09:05]  SCr 2.74 [12-19 @ 21:02]  SCr 2.36 [12-19 @ 11:35]  SCr 2.19 [12-18 @ 22:15]  SCr 2.59 [12-18 @ 12:10]                Vitamin D (25OH) <6.0      [11-21-24 @ 08:32]  TSH 0.68      [12-12-24 @ 12:27]  Lipid: chol 114, , HDL 47, LDL --      [04-24-24 @ 06:48]

## 2024-12-20 NOTE — PROGRESS NOTE ADULT - ASSESSMENT
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 Post op course complicated by acute hypoxic respiratory failure requiring intubation multiple times, most recently on 12/7 with conversion to tracheostomy on 12/17, e.coli bacteremia with RTOR on 12/7 for concern for worsening septic shock and cardiogenic shock s/p balloon pump placement and total abdominal colectomy in setting of ischemic bowel s/p OR on 12/9 for ileostomy creation, and olirugirc MORAIMA requiring CRRT and now intermittent HD. ID called back for fever spike yesterday morning and overnight with possible rapid heart rate and troponin leak being treated for acute cardiac event       Abx:   Caspofungin ( 12/6-12/11, 12/17-)  Meropenem ( 11/22-11/29, 11/22-11/29, 12/16-)   Fluconazole (11/16-12/2, 12/12-12/16)   Cefepime (12/10-12/16)   Metronidazole (12/10-12/14)   Ganciclovir (12/7-12/13)   Linezolid (11/23-11/26, 12/6-12/11)   Bactrim (11/16-11/24, 12/8-12/11)   Atovaquone (11/29-12/6)   Zosyn (11/20-11/22,12/6)   Tobramycin (12/6)   Valganciclovir (11/6-12/4)    # Ileus/ischemic bowel s/p colectomy on 12/16 and ileostomy creation on 12/9   # severe septic shock 12/6 and pancytopenia, lactic acidosis  # Ecoli bacteremia on 12/6 blood cultures  in context of above (repeat blood cultures since cleared)   # cardiogenic shock s/p impella now removed   # acute hypoxic respiratory failure with b/l pleural effusions (CT chest on 12/15)  #mild intrahepatic biliary ductal dilatation and Septated collection within the gallbladder fossa (seen on 12/14 US liver)  #Testicular Erythema/Edema (no fluid collection on 12/15 testicular US; HSV/VZV PCR of lesion negative)  #pyuria with recent urine culture on 12/16 with e. fecalis ( 10,000-49,000 e. fecalis)   #s/p OLT CMV D?R+  EBV , toxo D?/R?  --Continue Meropenem  --Continue Caspofungin  --started Linezolid 600 mg IVPB q12h 12/19  --obtain repeat imaging-CT chest abdomen pelvis- to assess for new foci of infection   --obtain full RVP from trach site  --send tracheal aspirate for culture and growing GNR with identification pending  --Continue to follow CBC with diff  --Continue to follow temperature curve  --F/u repeat blood cultures sent 12/19 are no growth to date  --Bactrim on hold given thrombocytopenia. Recommend starting atovaquone ppx in light of thrombocytopenia (toxoplasma IgG positive)  --off ganciclovir now in setting of thrombocytopenia. If plan to continue off, would consider initiation of letermovir  and acyclovir as ppx   --cmv pcr negative on 12/16   --c.diff pcr negative on 12/1   --Adenovirus PCR (11/24) Negative  --Cryptococcal Serum Ag (11/24) Negative  --serum and bronch aspergillus galactomannan negative  --WNV Serology and Serum PCR Negative on 11/24   --lyme serology negative on 11/21/24   --hep b and c pcr negative on 11/15   --toxoplasma IgG positive on 11/14   ---PJP pcr from sputum sample negative on 12/8/24 , could send a repeat specimen along with a fungitell and aspergillus galactomanan as he is over a month from transplant      Discussed with SICU team    Wes Carrero MD  Can be called via Teams  After 5pm/weekends [phone]         Patient is a 74 year old male with PMH of DM, HTN, pAfib s/p ablation 2018 (no AC 2/2 thrombocytopenia), CAD, depression, anxiety, BPH, 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 Post op course complicated by acute hypoxic respiratory failure requiring intubation multiple times, most recently on 12/7 with conversion to tracheostomy on 12/17, e.coli bacteremia with RTOR on 12/7 for concern for worsening septic shock and cardiogenic shock s/p balloon pump placement and total abdominal colectomy in setting of ischemic bowel s/p OR on 12/9 for ileostomy creation, and olirugirc MORAIMA requiring CRRT and now intermittent HD. ID called back for fever spike yesterday morning and overnight with possible rapid heart rate and troponin leak being treated for acute cardiac event       Abx:   Caspofungin ( 12/6-12/11, 12/17-)  Meropenem ( 11/22-11/29, 11/22-11/29, 12/16-)   Fluconazole (11/16-12/2, 12/12-12/16)   Cefepime (12/10-12/16)   Metronidazole (12/10-12/14)   Ganciclovir (12/7-12/13)   Linezolid (11/23-11/26, 12/6-12/11)   Bactrim (11/16-11/24, 12/8-12/11)   Atovaquone (11/29-12/6)   Zosyn (11/20-11/22,12/6)   Tobramycin (12/6)   Valganciclovir (11/6-12/4)    # Ileus/ischemic bowel s/p colectomy on 12/16 and ileostomy creation on 12/9   # severe septic shock 12/6 and pancytopenia, lactic acidosis  # Ecoli bacteremia on 12/6 blood cultures  in context of above (repeat blood cultures since cleared)   # cardiogenic shock s/p impella now removed   # acute hypoxic respiratory failure with b/l pleural effusions (CT chest on 12/15)  #mild intrahepatic biliary ductal dilatation and Septated collection within the gallbladder fossa (seen on 12/14 US liver)  #Testicular Erythema/Edema (no fluid collection on 12/15 testicular US; HSV/VZV PCR of lesion negative)  #pyuria with recent urine culture on 12/16 with e. fecalis ( 10,000-49,000 e. fecalis)   #s/p OLT CMV D?R+  EBV , toxo D?/R?  --Continue Meropenem  --Continue Caspofungin  --started Linezolid 600 mg IVPB q12h 12/19  --obtain repeat imaging-CT chest abdomen pelvis- to assess for new foci of infection   --obtain full RVP from trach site  --send tracheal aspirate for culture and growing GNR with identification pending  --Continue to follow CBC with diff  --Continue to follow temperature curve  --F/u repeat blood cultures sent 12/19 are no growth to date  --Bactrim on hold given thrombocytopenia. Recommend starting atovaquone ppx in light of thrombocytopenia (toxoplasma IgG positive) but now with stenotrophomonas in trach secretions  will need Bactrim plus minocycline 200mg bid for treatment  --off ganciclovir now in setting of thrombocytopenia. If plan to continue off, would consider initiation of letermovir  and acyclovir as ppx  but in discussion with stewardship letermovir is non formulary and will need to continue weekly monitoring for pre emptive tx if neede  --cmv pcr negative on 12/16   --c.diff pcr negative on 12/1   --Adenovirus PCR (11/24) Negative  --Cryptococcal Serum Ag (11/24) Negative  --serum and bronch aspergillus galactomannan negative  --WNV Serology and Serum PCR Negative on 11/24   --lyme serology negative on 11/21/24   --hep b and c pcr negative on 11/15   --toxoplasma IgG positive on 11/14   ---PJP pcr from sputum sample negative on 12/8/24 , could send a repeat specimen along with a fungitell and aspergillus galactomanan as he is over a month from transplant      Discussed with SICU team    Wes Carrero MD  Can be called via Teams  After 5pm/weekends [phone]

## 2024-12-21 NOTE — PROGRESS NOTE ADULT - SUBJECTIVE AND OBJECTIVE BOX
*************************************  NEUROLOGY PROGRESS NOTE  **************************************    STERLING BRYANT  Male  MRN-[id number]    Subjective: Patient seen and examined at bedside with Neurology team and attending     Interval History: Due to continued diminished mental status, SICU team obtained CT head on 12/20/2024, which revealed age-indeterminate posterior left frontal lobe infarct. Neurology recalled to provide recommendations for CT head results.             VITAL SIGNS:  Vital Signs Last 24 Hrs  T(C): 37 (21 Dec 2024 03:00), Max: 37.1 (20 Dec 2024 17:06)  T(F): 98.6 (21 Dec 2024 03:00), Max: 98.8 (20 Dec 2024 17:06)  HR: 86 (21 Dec 2024 06:00) (79 - 101)  BP: 117/56 (21 Dec 2024 06:00) (82/52 - 153/80)  BP(mean): 81 (21 Dec 2024 06:00) (62 - 105)  RR: 19 (21 Dec 2024 06:00) (14 - 27)  SpO2: 100% (21 Dec 2024 06:00) (97% - 100%)    Parameters below as of 21 Dec 2024 05:20  Patient On (Oxygen Delivery Method): ventilator                PHYSICAL EXAMINATION:    General - NAD, trach in place  Cardiac - pitting edema in all 4 extremities  Eyes - sclera icteric    Neurologic Exam:  Mental status - Eyes closed, opens to voice, then closes eyes again. No spontaneous verbal output. Not following commands.     Cranial nerves - PERRL, visual fields intact to threat b/l, OCR maneuver intact b/l, no facial asymmetry noted    Motor - Normal bulk throughout. No pronator drift.  Strength testing  RUE, LUE: no movement, limb hits bed immediately  RLE, LLE: no movement, limb hits bed immediately     Sensation - Grimaces but does not withdraw all 4 extremities in response to painful stimuli    DTR's -             Biceps      Triceps     Brachioradialis      Patellar    Ankle      R             1+             1+                  1+               1+           1+                  L              1+             1+               1+                  1+          1+                     Coordination - BERE due to medical conditions    Gait and station - BERE due to concern for patient safety      LABS:    CBC                       7.6    7.42  )-----------( 53       ( 20 Dec 2024 23:14 )             22.4     Chem 12-20    138  |  101  |  92[H]  ----------------------------<  209[H]  3.8   |  18[L]  |  2.06[H]    Ca    7.4[L]      20 Dec 2024 23:14  Phos  4.8     12-20  Mg     2.2     12-20    TPro  3.8[L]  /  Alb  2.9[L]  /  TBili  10.0[H]  /  DBili  x   /  AST  53[H]  /  ALT  60[H]  /  AlkPhos  135[H]  12-20    LFTs LIVER FUNCTIONS - ( 20 Dec 2024 23:14 )  Alb: 2.9 g/dL / Pro: 3.8 g/dL / ALK PHOS: 135 U/L / ALT: 60 U/L / AST: 53 U/L / GGT: x           Coagulopathy PT/INR - ( 21 Dec 2024 06:29 )   PT: 17.3 sec;   INR: 1.52 ratio         PTT - ( 21 Dec 2024 06:29 )  PTT:109.6 sec  Lipid Panel   A1c   Cardiac enzymes CARDIAC MARKERS ( 20 Dec 2024 23:14 )  x     / x     / x     / x     / 7.9 ng/mL  CARDIAC MARKERS ( 20 Dec 2024 15:11 )  x     / x     / x     / x     / 9.2 ng/mL  CARDIAC MARKERS ( 20 Dec 2024 09:05 )  x     / x     / x     / x     / 7.5 ng/mL  CARDIAC MARKERS ( 20 Dec 2024 06:24 )  x     / x     / x     / x     / 7.6 ng/mL  CARDIAC MARKERS ( 20 Dec 2024 03:03 )  x     / x     / x     / x     / 6.3 ng/mL  CARDIAC MARKERS ( 20 Dec 2024 00:25 )  x     / x     / x     / x     / 6.6 ng/mL  CARDIAC MARKERS ( 19 Dec 2024 21:04 )  x     / x     / x     / x     / 6.4 ng/mL      U/A Urinalysis Basic - ( 20 Dec 2024 23:14 )    Color: x / Appearance: x / SG: x / pH: x  Gluc: 209 mg/dL / Ketone: x  / Bili: x / Urobili: x   Blood: x / Protein: x / Nitrite: x   Leuk Esterase: x / RBC: x / WBC x   Sq Epi: x / Non Sq Epi: x / Bacteria: x      CSF  Immunological  Other      RADIOLOGY & ADDITIONAL STUDIES:      CT Head No Cont (12.20.24 @ 22:24) >  Age-indeterminate posterior left frontal lobe infarct. MRI is   recommended for further evaluation. Mild sinusitis..    TTE Limited W or WO Ultrasound Enhancing Agent (12.11.24 @ 09:28) >   1. Endocardial visualization enhanced with Definity (Ultrasound Enhancing Agent).   2. Left ventricular systolic function is normal with an ejection fraction visually estimated at 55 to 60 %.   3. Enlarged right ventricular cavity size and mildly reduced right ventricular systolic function.   4. Device lead is visualized in the right heart.         *************************************  NEUROLOGY PROGRESS NOTE  **************************************    STERLING BRYANT  Male  MRN-[id number]    Subjective: Patient seen and examined at bedside with Neurology team and attending     Interval History: Due to continued diminished mental status, SICU team obtained CT head on 12/20/2024, which revealed age-indeterminate posterior left frontal lobe infarct. Neurology recalled to provide recommendations for CT head results.             VITAL SIGNS:  Vital Signs Last 24 Hrs  T(C): 37 (21 Dec 2024 03:00), Max: 37.1 (20 Dec 2024 17:06)  T(F): 98.6 (21 Dec 2024 03:00), Max: 98.8 (20 Dec 2024 17:06)  HR: 86 (21 Dec 2024 06:00) (79 - 101)  BP: 117/56 (21 Dec 2024 06:00) (82/52 - 153/80)  BP(mean): 81 (21 Dec 2024 06:00) (62 - 105)  RR: 19 (21 Dec 2024 06:00) (14 - 27)  SpO2: 100% (21 Dec 2024 06:00) (97% - 100%)    Parameters below as of 21 Dec 2024 05:20  Patient On (Oxygen Delivery Method): ventilator                PHYSICAL EXAMINATION:    General - NAD, trach in place  Cardiac - pitting edema in all 4 extremities  Eyes - sclera icteric    Neurologic Exam:  Mental status - Eyes closed, opens to voice, then closes eyes again. No spontaneous verbal output. Not following commands.     Cranial nerves - PERRL, visual fields intact to threat b/l, OCR maneuver intact b/l, no facial asymmetry noted    Motor - Normal bulk throughout. No pronator drift.  Strength testing  RUE, LUE: no movement, limb hits bed immediately  RLE, LLE: no movement, limb hits bed immediately     Sensation - Grimaces but does not withdraw all 4 extremities in response to painful stimuli    DTR's -             Biceps      Triceps     Brachioradialis      Patellar    Ankle      R             1+             1+                  1+               1+           1+                  L              1+             1+               1+                  1+          1+                     Coordination - BERE due to medical conditions    Gait and station - BERE due to concern for patient safety    [facility] SS:  DATE: 12/21/2024  TIME: 5:15  1A: Level of consciousness (0-3): 3  1B: Questions (0-2): 2  1C: Commands (0-2): 2  2: Gaze (0-2): 0  3: Visual fields (0-3): 0  4: Facial palsy (0-3): 0  MOTOR:  5A: Left arm motor drift (0-4): 4  5B: Right arm motor drift (0-4): 4  6A: Left leg motor drift (0-4): 4  6B: Right leg motor drift (0-4): 4  7: Limb ataxia (0-2): 0  SENSORY:  8: Sensation (0-2): 0  SPEECH:  9: Language (0-3): 3  10: Dysarthria (0-2): 2  EXTINCTION:  11: Extinction/inattention (0-2): 0    TOTAL SCORE: 28      LABS:    CBC                       7.6    7.42  )-----------( 53       ( 20 Dec 2024 23:14 )             22.4     Chem 12-20    138  |  101  |  92[H]  ----------------------------<  209[H]  3.8   |  18[L]  |  2.06[H]    Ca    7.4[L]      20 Dec 2024 23:14  Phos  4.8     12-20  Mg     2.2     12-20    TPro  3.8[L]  /  Alb  2.9[L]  /  TBili  10.0[H]  /  DBili  x   /  AST  53[H]  /  ALT  60[H]  /  AlkPhos  135[H]  12-20    LFTs LIVER FUNCTIONS - ( 20 Dec 2024 23:14 )  Alb: 2.9 g/dL / Pro: 3.8 g/dL / ALK PHOS: 135 U/L / ALT: 60 U/L / AST: 53 U/L / GGT: x           Coagulopathy PT/INR - ( 21 Dec 2024 06:29 )   PT: 17.3 sec;   INR: 1.52 ratio         PTT - ( 21 Dec 2024 06:29 )  PTT:109.6 sec  Lipid Panel   A1c   Cardiac enzymes CARDIAC MARKERS ( 20 Dec 2024 23:14 )  x     / x     / x     / x     / 7.9 ng/mL  CARDIAC MARKERS ( 20 Dec 2024 15:11 )  x     / x     / x     / x     / 9.2 ng/mL  CARDIAC MARKERS ( 20 Dec 2024 09:05 )  x     / x     / x     / x     / 7.5 ng/mL  CARDIAC MARKERS ( 20 Dec 2024 06:24 )  x     / x     / x     / x     / 7.6 ng/mL  CARDIAC MARKERS ( 20 Dec 2024 03:03 )  x     / x     / x     / x     / 6.3 ng/mL  CARDIAC MARKERS ( 20 Dec 2024 00:25 )  x     / x     / x     / x     / 6.6 ng/mL  CARDIAC MARKERS ( 19 Dec 2024 21:04 )  x     / x     / x     / x     / 6.4 ng/mL      U/A Urinalysis Basic - ( 20 Dec 2024 23:14 )    Color: x / Appearance: x / SG: x / pH: x  Gluc: 209 mg/dL / Ketone: x  / Bili: x / Urobili: x   Blood: x / Protein: x / Nitrite: x   Leuk Esterase: x / RBC: x / WBC x   Sq Epi: x / Non Sq Epi: x / Bacteria: x      CSF  Immunological  Other      RADIOLOGY & ADDITIONAL STUDIES:      CT Head No Cont (12.20.24 @ 22:24) >  Age-indeterminate posterior left frontal lobe infarct. MRI is   recommended for further evaluation. Mild sinusitis..    TTE Limited W or WO Ultrasound Enhancing Agent (12.11.24 @ 09:28) >   1. Endocardial visualization enhanced with Definity (Ultrasound Enhancing Agent).   2. Left ventricular systolic function is normal with an ejection fraction visually estimated at 55 to 60 %.   3. Enlarged right ventricular cavity size and mildly reduced right ventricular systolic function.   4. Device lead is visualized in the right heart.

## 2024-12-21 NOTE — PROGRESS NOTE ADULT - ASSESSMENT
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 Post op course complicated by acute hypoxic respiratory failure requiring intubation multiple times, most recently on 12/7 with conversion to tracheostomy on 12/17, e.coli bacteremia with RTOR on 12/7 for concern for worsening septic shock and cardiogenic shock s/p balloon pump placement and total abdominal colectomy in setting of ischemic bowel s/p OR on 12/9 for ileostomy creation, and olirugirc MORAIMA requiring CRRT and now intermittent HD. ID called back for fever spike yesterday morning and overnight with possible rapid heart rate and troponin leak being treated for acute cardiac event       Abx:   Caspofungin ( 12/6-12/11, 12/17-)  Meropenem ( 11/22-11/29, 11/22-11/29, 12/16-)   Fluconazole (11/16-12/2, 12/12-12/16)   Cefepime (12/10-12/16)   Metronidazole (12/10-12/14)   Ganciclovir (12/7-12/13)   Linezolid (11/23-11/26, 12/6-12/11)   Bactrim (11/16-11/24, 12/8-12/11)   Atovaquone (11/29-12/6)   Zosyn (11/20-11/22,12/6)   Tobramycin (12/6)   Valganciclovir (11/6-12/4)    # s/p OLTx 11/15  # inability jia wean s/p tracheostomy placement  # Ileus/ischemic bowel s/p colectomy on 12/16 and ileostomy creation on 12/9   # severe septic shock 12/6 and pancytopenia, lactic acidosis  # Ecoli bacteremia on 12/6 blood cultures  in context of above (repeat blood cultures since cleared)   # cardiogenic shock s/p impella now removed   # acute hypoxic respiratory failure with b/l pleural effusions (CT chest on 12/15)  #mild intrahepatic biliary ductal dilatation and Septated collection within the gallbladder fossa (seen on 12/14 US liver)  #Testicular Erythema/Edema (no fluid collection on 12/15 testicular US; HSV/VZV PCR of lesion negative)  #pyuria with recent urine culture on 12/16 with e. fecalis ( 10,000-49,000 e. fecalis)   # 12/20 ascitic fluid growing gram+ cocci in pairs    --Continue Meropenem  --Continue Caspofungin  -E.faecalis in urine is sensitive to Vancomycin and can add that but will need to follow up to verify that similar organism in the ascitic fluid given one dose and follow up level   -stenotrophomonas in trach specimen with purulent secretions started on minocycline plus bactrim  -CMV repeat viral load on 12/22 he was non detectable on 12/16      Discussed with SICU team    Wes Carrero MD  Can be called via Teams  After 5pm/weekends [phone]

## 2024-12-21 NOTE — PROGRESS NOTE ADULT - NSPROGADDITIONALINFOA_GEN_ALL_CORE
Agree  with residents impression and plan. Modified the note with changes in impression and plan. Spoke with nephew at beside who is the critical care physician. Stroke fellow addendum:  Agree  with resident's impression and plan. Modified the note with changes in impression and plan. Spoke with nephew at beside who is the critical care physician.

## 2024-12-21 NOTE — PROGRESS NOTE ADULT - SUBJECTIVE AND OBJECTIVE BOX
Transplant Surgery - Multidisciplinary Rounds  --------------------------------------------------------------  OLT   11/15/2024        POD#36  Colectomy 12/7/2024   POD#14  IABP 12/7 removed 12/10  Tracheostomy 12/17/2024    Present:   Patient seen and examined with multidisciplinary Transplant team including Surgeon: Dr. Vora, Dr. Sorto, JAZZ Vegas, Hepatologist: Dr. Hidalgo and ICU team in AM rounds.   Disciplines not in attendance will be notified of the plan.     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     s/p OLT 11/15 with post op course c/b:  Hypoxia: Reintubated 11/20, extubated 11/24->reintubated 11/24, extubated 11/26, reintubated 12/7  Ileus   Fever  A fib  AMS/Seizure   L brachial DVT  Neutropenia  E coli Bacteremia (12/6)  s/p Coloctomy 12/7.   IABP 12/7, removed 12/10    Interval/Overnight Events:   - afebrile, VSS  - off pressors  - BLANCO Fine placed: HD 12/20  - No chest pain: Troponin 244 (12/21)    Immunosuppression:  -Cyclo by level , MMF held, Solu 32  -ongoing monitoring for signs of rejection    MEDICATIONS  (STANDING):  albumin human  5% IVPB 500 milliLiter(s) IV Intermittent once  albuterol/ipratropium for Nebulization 3 milliLiter(s) Nebulizer every 6 hours  aspirin  chewable 81 milliGRAM(s) Oral daily  atorvastatin 80 milliGRAM(s) Oral at bedtime  buDESOnide    Inhalation Suspension 0.5 milliGRAM(s) Inhalation every 12 hours  caspofungin IVPB      caspofungin IVPB 50 milliGRAM(s) IV Intermittent every 24 hours  chlorhexidine 0.12% Liquid 15 milliLiter(s) Oral Mucosa every 12 hours  chlorhexidine 2% Cloths 1 Application(s) Topical <User Schedule>  cycloSPORINE  , modified (GENGRAF) Solution 25 milliGRAM(s) Oral <User Schedule>  insulin lispro (ADMELOG) corrective regimen sliding scale   SubCutaneous every 6 hours  insulin NPH human recombinant 15 Unit(s) SubCutaneous every 6 hours  meropenem  IVPB 1000 milliGRAM(s) IV Intermittent every 12 hours  methylPREDNISolone sodium succinate Injectable 32 milliGRAM(s) IV Push <User Schedule>  metoprolol tartrate 12.5 milliGRAM(s) Enteral Tube every 8 hours  minocycline IVPB      minocycline IVPB 200 milliGRAM(s) IV Intermittent every 12 hours  nystatin Powder 1 Application(s) Topical every 12 hours  oxyCODONE    Solution 2.5 milliGRAM(s) Oral once  pantoprazole  Injectable 40 milliGRAM(s) IV Push every 12 hours  trimethoprim / sulfamethoxazole IVPB 190 milliGRAM(s) IV Intermittent every 24 hours    MEDICATIONS  (PRN):    PAST MEDICAL & SURGICAL HISTORY:  Diabetes    Transaminitis    Paroxysmal atrial fibrillation    Depression    BPH (benign prostatic hyperplasia)    Hypertension    Chronic atrial fibrillation    Coronary artery disease    Hepatocellular carcinoma    DM (diabetes mellitus)    HTN (hypertension)    Paroxysmal atrial fibrillation    Cirrhosis    HCC (hepatocellular carcinoma)    History of BPH    History of laparoscopic cholecystectomy    History of lumbar laminectomy    H/O prior ablation treatment    H/O percutaneous left heart catheterization    Vital Signs Last 24 Hrs  T(C): 37.2 (21 Dec 2024 15:00), Max: 37.2 (21 Dec 2024 15:00)  T(F): 99 (21 Dec 2024 15:00), Max: 99 (21 Dec 2024 15:00)  HR: 91 (21 Dec 2024 15:31) (79 - 101)  BP: 114/61 (21 Dec 2024 15:00) (82/52 - 131/62)  BP(mean): 81 (21 Dec 2024 15:00) (62 - 97)  RR: 25 (21 Dec 2024 15:00) (16 - 27)  SpO2: 100% (21 Dec 2024 15:31) (97% - 100%)    Parameters below as of 21 Dec 2024 15:00  Patient On (Oxygen Delivery Method): ventilator    O2 Concentration (%): 30    I&O's Summary    20 Dec 2024 07:01  -  21 Dec 2024 07:00  --------------------------------------------------------  IN: 3344 mL / OUT: 2540 mL / NET: 804 mL    21 Dec 2024 07:01  -  21 Dec 2024 15:51  --------------------------------------------------------  IN: 1418.5 mL / OUT: 1110 mL / NET: 308.5 mL                          10.4   7.53  )-----------( 54       ( 21 Dec 2024 13:39 )             29.8     12-21    138  |  101  |  56[H]  ----------------------------<  183[H]  3.7   |  21[L]  |  1.37[H]    Ca    8.5      21 Dec 2024 13:39  Phos  3.4     12-21  Mg     2.1     12-21    TPro  4.4[L]  /  Alb  3.3  /  TBili  11.5[H]  /  DBili  x   /  AST  64[H]  /  ALT  63[H]  /  AlkPhos  147[H]  12-21    Culture - Body Fluid with Gram Stain (collected 12-20-24 @ 18:23)  Source: Abdominal Fl  Gram Stain (12-21-24 @ 05:56):    polymorphonuclear leukocytes seen    Gram positive cocci in pairs seen    by cytocentrifuge    Culture - Sputum (collected 12-19-24 @ 14:31)  Source: Trach Asp Tracheal Aspirate  Gram Stain (12-20-24 @ 00:18):    Moderate polymorphonuclear leukocytes per low power field    Rare Squamous epithelial cells per low power field    Numerous Gram Negative Rods per oil power field    Numerous Yeast like cells per oil power field  Final Report (12-21-24 @ 14:57):    Moderate Stenotrophomonas maltophilia    Commensal charity consistent with body site  Organism: Stenotrophomonas maltophilia (12-21-24 @ 14:57)  Organism: Stenotrophomonas maltophilia (12-21-24 @ 14:57)  Organism: Stenotrophomonas maltophilia (12-21-24 @ 14:57)    Culture - Blood (collected 12-19-24 @ 03:28)  Source: .Blood BLOOD  Preliminary Report (12-21-24 @ 07:01):    No growth at 48 Hours    Culture - Blood (collected 12-19-24 @ 03:28)  Source: .Blood BLOOD  Preliminary Report (12-21-24 @ 07:01):    No growth at 48 Hours    Culture - Urine (collected 12-16-24 @ 13:10)  Source: Catheterized Catheterized  Final Report (12-19-24 @ 07:41):    10,000 - 49,000 CFU/mL Enterococcus faecalis  Organism: Enterococcus faecalis (12-19-24 @ 07:41)  Organism: Enterococcus faecalis (12-19-24 @ 07:41)    Culture - Blood (collected 12-16-24 @ 13:00)  Source: .Blood BLOOD  Preliminary Report (12-20-24 @ 18:01):    No growth at 4 days    Culture - Bronchial (collected 12-16-24 @ 08:36)  Source: Combi-Cath  Gram Stain (12-16-24 @ 16:27):    No polymorphonuclear leukocytes seen per low power field    No Squamous epithelial cells seen per low power field    No organisms seen per oil power field  Final Report (12-17-24 @ 23:17):    Commensal charity consistent with body site    Culture - Blood (collected 12-16-24 @ 07:45)  Source: .Blood BLOOD  Final Report (12-21-24 @ 12:01):    No growth at 5 days    ROS: Unable to assess patient is lethargic, s/p tracheostomy    PHYSICAL EXAM:   Constitutional: trach to vent, lethargic  Eyes:  PERRLA  ENMT: nc/at, no thrush   Neck: supple   Respiratory: CTA B/L  Cardiovascular: RRR  Gastrointestinal: incision clean/dry/intact + Ostomy pink   Genitourinary: +scrotal edema, Castro in place  Extremities: SCD's in place and working bilaterally, + LE edema  Neurological: trach to vent , sedated   Skin: no rashes, ulcerations, lesions

## 2024-12-21 NOTE — PROGRESS NOTE ADULT - SUBJECTIVE AND OBJECTIVE BOX
INFECTIOUS DISEASES FOLLOW UP-- Renée Carrero  [phone]    This is a follow up note for this  74yMale with  Status post liver transplantation        ROS:  CONSTITUTIONAL:  No fever, good appetite  CARDIOVASCULAR:  No chest pain or palpitations  RESPIRATORY:  No dyspnea  GASTROINTESTINAL:  No nausea, vomiting, diarrhea, or abdominal pain  GENITOURINARY:  No dysuria  NEUROLOGIC:  No headache,     Allergies    No Known Allergies    Intolerances        ANTIBIOTICS/RELEVANT:  antimicrobials  caspofungin IVPB 50 milliGRAM(s) IV Intermittent every 24 hours  caspofungin IVPB      meropenem  IVPB 1000 milliGRAM(s) IV Intermittent every 12 hours  minocycline IVPB      minocycline IVPB 200 milliGRAM(s) IV Intermittent every 12 hours  trimethoprim / sulfamethoxazole IVPB 190 milliGRAM(s) IV Intermittent every 24 hours  vancomycin  IVPB 1000 milliGRAM(s) IV Intermittent once    immunologic:  cycloSPORINE  , modified (GENGRAF) 25 milliGRAM(s) Oral <User Schedule>    OTHER:  albumin human  5% IVPB 500 milliLiter(s) IV Intermittent once  albuterol/ipratropium for Nebulization 3 milliLiter(s) Nebulizer every 6 hours  aspirin  chewable 81 milliGRAM(s) Oral daily  atorvastatin 80 milliGRAM(s) Oral at bedtime  buDESOnide    Inhalation Suspension 0.5 milliGRAM(s) Inhalation every 12 hours  chlorhexidine 0.12% Liquid 15 milliLiter(s) Oral Mucosa every 12 hours  chlorhexidine 2% Cloths 1 Application(s) Topical <User Schedule>  insulin lispro (ADMELOG) corrective regimen sliding scale   SubCutaneous every 6 hours  insulin NPH human recombinant 15 Unit(s) SubCutaneous every 6 hours  methylPREDNISolone sodium succinate Injectable 32 milliGRAM(s) IV Push <User Schedule>  metoprolol tartrate 12.5 milliGRAM(s) Enteral Tube every 8 hours  nystatin Powder 1 Application(s) Topical every 12 hours  oxyCODONE    Solution 2.5 milliGRAM(s) Oral once  pantoprazole  Injectable 40 milliGRAM(s) IV Push every 12 hours      Objective:  Vital Signs Last 24 Hrs  T(C): 36.8 (21 Dec 2024 12:30), Max: 37.1 (20 Dec 2024 17:06)  T(F): 98.2 (21 Dec 2024 12:30), Max: 98.8 (20 Dec 2024 17:06)  HR: 95 (21 Dec 2024 14:00) (79 - 101)  BP: 95/53 (21 Dec 2024 14:00) (82/52 - 131/62)  BP(mean): 67 (21 Dec 2024 14:00) (62 - 97)  RR: 26 (21 Dec 2024 14:00) (16 - 27)  SpO2: 99% (21 Dec 2024 14:00) (97% - 100%)    Parameters below as of 21 Dec 2024 12:30  Patient On (Oxygen Delivery Method): ventilator    O2 Concentration (%): 30    PHYSICAL EXAM:  Constitutional:no acute distress  Eyes:DUSTIN, EOMI  Ear/Nose/Throat: no oral lesions, 	  Respiratory: clear BL  Cardiovascular: S1S2  Gastrointestinal:soft, (+) BS, no tenderness  Extremities:no e/e/c  No Lymphadenopathy  IV sites not inflammed.    LABS:                        10.4   7.53  )-----------( 54       ( 21 Dec 2024 13:39 )             29.8     12-21    138  |  101  |  56[H]  ----------------------------<  183[H]  3.7   |  21[L]  |  1.37[H]    Ca    8.5      21 Dec 2024 13:39  Phos  3.4     12-21  Mg     2.1     12-21    TPro  4.4[L]  /  Alb  3.3  /  TBili  11.5[H]  /  DBili  x   /  AST  64[H]  /  ALT  63[H]  /  AlkPhos  147[H]  12-21    PT/INR - ( 21 Dec 2024 06:29 )   PT: 17.3 sec;   INR: 1.52 ratio         PTT - ( 21 Dec 2024 06:29 )  PTT:109.6 sec  Urinalysis Basic - ( 21 Dec 2024 13:39 )    Color: x / Appearance: x / SG: x / pH: x  Gluc: 183 mg/dL / Ketone: x  / Bili: x / Urobili: x   Blood: x / Protein: x / Nitrite: x   Leuk Esterase: x / RBC: x / WBC x   Sq Epi: x / Non Sq Epi: x / Bacteria: x        MICROBIOLOGY:            RECENT CULTURES:  12-20 @ 18:23  Abdominal Fl  --  --  --  --  --  12-19 @ 14:31  Trach Asp Tracheal Aspirate  --  --  --    Moderate Stenotrophomonas maltophilia  --  12-19 @ 03:28  .Blood BLOOD  --  --  --    No growth at 48 Hours  --  12-16 @ 13:10  Catheterized Catheterized  Enterococcus faecalis  Enterococcus faecalis  ESAU    10,000 - 49,000 CFU/mL Enterococcus faecalis  --  12-16 @ 13:00  .Blood BLOOD  --  --  --    No growth at 4 days  --  12-16 @ 08:36  Combi-Cath  --  --  --    Commensal charity consistent with body site  --  12-16 @ 07:45  .Blood BLOOD  --  --  --    No growth at 5 days  --      RADIOLOGY & ADDITIONAL STUDIES:    < from: Xray Chest 1 View- PORTABLE-Routine (Xray Chest 1 View- PORTABLE-Routine in AM.) (12.21.24 @ 07:00) >  IMPRESSION:  Interval removal of left IJ CVC. Right IJ CVC with tip overlying the SVC.   Enteric tube courses below the diaphragm with tip collimated out of view   in the final image.  Bibasilar atelectasis.    < end of copied text >

## 2024-12-21 NOTE — PROGRESS NOTE ADULT - ASSESSMENT
ASSESSMENT: 73M with PMHx of T2DM, HTN, Afib s/p ablation 2018 (no AC 2/2 thrombocytopenia), CAD, depression, anxiety, BPH, likely GONZALES liver cirrhosis with portal HTN, HCC, now s/p liver transplant. Due to continued diminished mental status, CTH on 12/20/2024 revealed age-indeterminate posterior left frontal lobe infarct. Neurological exam revealed diminished mental status, no spontaneous verbal output, and grimacing to painful stimuli with no movement of all 4 extremities.     LKW: unknown  preMRS: 1  Not tenecteplase candidate due to unknown LKW.  Not thrombectomy candidate due to unknown LKW, no imaging of vessels obtained at time of consult.     IMPRESSION: Ongoing diminished mental status. CTH on 12/20/2024 revealed age indeterminate posterior left frontal lobe infarct.     RECOMMENDATIONS:  [] Continue Aspirin 81 mg  [] MRI brain without contrast  [] MRA H/N to look at the cerebral vasculature   [] HbA1C and Lipid Panel.  [] Atorvastatin 80MG QHS, titrate to LDL<70  [] DVT prophylaxis -on heparin 2500o per SICU  [] Telemonitoring; Neurochecks and vital signs Q4H  [] BP goal <130/90  [] NPO until clears dysphagia screen, otherwise swallow evaluation  [] Fall, aspiration precautions    Note and recommendations considered incomplete until attending attestation.     ASSESSMENT: 73M with PMHx of T2DM, HTN, Afib s/p ablation 2018 (no AC 2/2 thrombocytopenia), CAD, depression, anxiety, BPH, likely GONZALES liver cirrhosis with portal HTN, HCC, now s/p liver transplant. Due to continued diminished mental status, CTH on 12/20/2024 revealed age-indeterminate posterior left frontal lobe infarct. Neurological exam revealed diminished mental status, no spontaneous verbal output, and grimacing to painful stimuli with no movement of all 4 extremities.     LKW: unknown  preMRS: 1  Not tenecteplase candidate due to unknown LKW.  Not thrombectomy candidate due to unknown LKW, no imaging of vessels obtained at time of consult.     IMPRESSION: Ongoing diminished mental status. CTH on 12/20/2024 revealed age indeterminate posterior left frontal lobe infarct.     RECOMMENDATIONS:  [] Continue Aspirin 81 mg  [] MRI brain without contrast  [] MRA H/N to look at the cerebral vasculature   [] HbA1C and Lipid Panel.  [] Atorvastatin 80MG QHS, titrate to LDL<70  [] DVT prophylaxis -on heparin 79592 per SICU  [] Telemonitoring; Neurochecks and vital signs Q4H  [] BP goal <130/90  [] NPO until clears dysphagia screen, otherwise swallow evaluation  [] Fall, aspiration precautions    Note and recommendations considered incomplete until attending attestation.     ASSESSMENT: 73M with PMHx of T2DM, HTN, Afib s/p ablation 2018 (no AC 2/2 thrombocytopenia), CAD, depression, anxiety, BPH, likely GONZALES liver cirrhosis with portal HTN, HCC, now s/p liver transplant. Due to continued diminished mental status, CTH on 12/20/2024 revealed age-indeterminate posterior left frontal lobe infarct. Neurological exam revealed diminished mental status, no spontaneous verbal output, and grimacing to painful stimuli with no movement of all 4 extremities.     LKW: unknown  NIHSS at time of progress note: 28  preMRS: 1  Not tenecteplase candidate due to unknown LKW.  Not thrombectomy candidate due to unknown LKW, no imaging of vessels obtained at time of consult.     IMPRESSION: Ongoing diminished mental status. CTH on 12/20/2024 revealed age indeterminate posterior left frontal lobe infarct.     RECOMMENDATIONS:  [] Continue Aspirin 81 mg  [] MRI brain without contrast  [] MRA H/N to look at the cerebral vasculature   [] HbA1C and Lipid Panel.  [] Atorvastatin 80MG QHS, titrate to LDL<70  [] DVT prophylaxis -on heparin 85490 per SICU  [] Telemonitoring; Neurochecks and vital signs Q4H  [] BP goal <130/90  [] NPO until clears dysphagia screen, otherwise swallow evaluation  [] Fall, aspiration precautions    Note and recommendations considered incomplete until attending attestation.     ASSESSMENT: 73M with PMHx of T2DM, HTN, Afib s/p ablation 2018 (no AC 2/2 thrombocytopenia), CAD, depression, anxiety, BPH, likely GONZALES liver cirrhosis with portal HTN, HCC, now s/p liver transplant. Due to continued diminished mental status, CTH on 12/20/2024 revealed age-indeterminate posterior left frontal lobe infarct. Neurological exam revealed diminished mental status, no spontaneous verbal output, and grimacing to painful stimuli with no movement of all 4 extremities.     LKW: unknown  NIHSS at time of progress note: 28  preMRS: 1  Not tenecteplase candidate due to unknown LKW.  Not thrombectomy candidate due to unknown LKW, no imaging of vessels obtained at time of consult.     IMPRESSION: Ongoing diminished mental status. CTH on 12/20/2024 revealed age indeterminate posterior left frontal lobe infarct.     RECOMMENDATIONS:  [] Continue Aspirin 81 mg  [] MRI brain without contrast  [] MRA H/N to look at the cerebral vasculature   [] HbA1C and Lipid Panel.  [] DVT prophylaxis -on heparin 93704 per SICU  [] Telemonitoring; Neurochecks and vital signs Q4H  [] BP goal <130/90  [] NPO until clears dysphagia screen, otherwise swallow evaluation  [] Fall, aspiration precautions    Note and recommendations considered incomplete until attending attestation.     ASSESSMENT: 73M with PMHx of T2DM, HTN, Afib s/p ablation 2018 (no AC 2/2 thrombocytopenia), CAD, depression, anxiety, BPH, likely GONZALES liver cirrhosis with portal HTN, HCC, now s/p liver transplant. Due to continued diminished mental status, CTH on 12/20/2024 revealed age-indeterminate posterior left frontal lobe infarct which was not seen in the CT on 12/15. Multiple ECHOs were completed which although showed severe reduced EF in the first few, the recent one on 12/11 showed normal EF. Neurological exam revealed diminished mental status, no spontaneous verbal output, and grimacing to painful stimuli with minimal movement of all 4 extremities.     LKW: unknown  NIHSS at time of progress note: 28  preMRS: 1  Not tenecteplase candidate due to unknown LKW.  Not thrombectomy candidate due to unknown LKW, no imaging of vessels obtained at time of consult.     IMPRESSION: Ongoing diminished mental status. CTH on 12/20/2024 revealed age indeterminate posterior left frontal lobe infarct which was not seen in the CT on 12/15. Mechanism: ESUS likely    RECOMMENDATIONS:  [] Continue Aspirin 81 mg  [] MRI brain without contrast  [] MRA H/N w contrast to look at the cerebral vasculature   [] HbA1C and Lipid Panel.  [] DVT prophylaxis -on heparin 45062 per SICU  [] Telemonitoring; Neurochecks and vital signs Q4H  [] BP goal <140/90 if able  [] NPO until clears dysphagia screen, otherwise swallow evaluation  [] Fall, aspiration precautions    Note and recommendations considered incomplete until attending attestation.     ASSESSMENT: 73M with PMHx of T2DM, HTN, Afib s/p ablation 2018 (no AC 2/2 thrombocytopenia), CAD, depression, anxiety, BPH, likely GONZALES liver cirrhosis with portal HTN, HCC, now s/p liver transplant. Due to continued diminished mental status, CTH on 12/20/2024 revealed age-indeterminate posterior left frontal lobe infarct which was not seen in the CT on 12/15. Multiple ECHOs were completed which although showed severe reduced EF in the first few, the recent one on 12/11 showed normal EF. Neurological exam revealed diminished mental status, no spontaneous verbal output, and grimacing to painful stimuli with minimal movement of all 4 extremities.     LKW: unknown  NIHSS at time of progress note: 28  preMRS: 1  Not tenecteplase candidate due to unknown LKW.  Not thrombectomy candidate due to unknown LKW, no imaging of vessels obtained at time of consult.     IMPRESSION: Ongoing diminished mental status. CTH on 12/20/2024 revealed age indeterminate posterior left frontal lobe infarct which was not seen in the CT on 12/15. Mechanism: ESUS likely, although possibly cardioembolic and  related to recently documented low EF, despite EF having subsequently improved. Stroke w/u is incomplete.    RECOMMENDATIONS:  [] Continue Aspirin 81 mg  [] MRI brain without contrast  [] MRA H/N w contrast to look at the cerebral vasculature   [] HbA1C and Lipid Panel.  [] DVT prophylaxis -on heparin 54716 per SICU  [] Telemonitoring; Neurochecks and vital signs Q4H  [] BP goal <140/90 if able  [] NPO until clears dysphagia screen, otherwise swallow evaluation  [] Fall, aspiration precautions    Note and recommendations considered incomplete until attending attestation.

## 2024-12-21 NOTE — PROGRESS NOTE ADULT - CRITICAL CARE ATTENDING COMMENT
Patient evaluated by me on AM rounds.     Current management as follows.   NEURO: Intermittently follows commands. Pain controlled with Oxycodone.   ---EEG negative. Repeat CTH 12/20 shows posterior lobe age indeterminate infarct. Evaluated by neurology, requests MRI.   RESP: s/p Trach 12/17. Continues to require ventilator support. Tolerated approximately 2 hours for the last few days.    CVS: Hemodynamically stable. Continue Heparin drip, metoprolol and ASA for ACS and follow up cardiology. Repeat ECHO.   GI: Tolerating post pyloric tube feeds.   ---Ostomy output 650cc.   ---Abdominal drains with 1L over 24 hours. Replace output 0.5 : 1 with albumin if more than 1L.  : Tolerated HD 12/19. UOP 890cc/24 hrs. Net positive 804cc/24 hours.   ---S/p HD yesterday - Net even.   HEME: Hb 6.6 ---> 7.6. Transfuse 2 units pRBCs. Plt stable at 43. Trend.   ---Continue Heparin drip and ASA.   ID: Afebrile, WBC 11 ---> 5.   ---Urine cx 12/16: E. Faecalis.   ---Combi cath 12/19: Moderate Stenotrophomonas.   ---Appreciate ID recs. Continue Meropenem, Caspo and Linezolid. F/up results of CT Chest AP. F/up ID.   ENDO: Steroids as per transplant. Hyperglycemic to 200s. Titrate up NPH and continue ISS.     Lines:   -Castro 12/16. Patient evaluated by me on AM rounds.     Current management as follows.   NEURO: Intermittently follows commands. Pain controlled with Oxycodone.   ---EEG negative. Repeat CTH 12/20 shows posterior lobe age indeterminate infarct. Evaluated by neurology. They request MRI. Discussed with Dr. Dietrich of transplant. We agree that MRI would not  at this time and the risks of obtaining this study in a critically ill patient outweigh the benefit.   RESP: s/p Trach 12/17. Continues to require ventilator support. Tolerated approximately 2 hours for the last few days.    CVS: Hemodynamically stable. Continue Heparin drip, metoprolol and ASA for ACS and follow up cardiology. Repeat ECHO.   GI: Tolerating post pyloric tube feeds.   ---Ostomy output 650cc.   ---Abdominal drains with 1L over 24 hours. Replace output 0.5 : 1 with albumin if more than 1L.  : Tolerated HD 12/19. UOP 890cc/24 hrs. Net positive 804cc/24 hours.   ---S/p HD yesterday - Net even.   HEME: Hb 6.6 ---> 7.6. Transfuse 2 units pRBCs. Plt stable at 43. .   ---Continue Heparin drip and ASA.   ID: Afebrile, WBC 11 ---> 5.   ---Urine cx 12/16: E. Faecalis.   ---Combi cath 12/19: Moderate Stenotrophomonas.   ---Appreciate ID recs. Continue Meropenem, Caspo and Linezolid. F/up results of CT Chest AP.   ENDO: Steroids as per transplant. Hyperglycemic to 200s. Titrate up NPH and continue ISS.     Lines:   -Castro 12/16.

## 2024-12-21 NOTE — PROGRESS NOTE ADULT - SUBJECTIVE AND OBJECTIVE BOX
SICU Daily Progress Note  =====================================================  Interval/Overnight Events:     - Increased NPH to 15 U  - Repeat TTE  - BLANCO Fine placed  - Added bactrim, minocycline  - CT Head, C/A/P completed  - CT head showing age-indeterminate infarct, Neurology consulted      Allergies: No Known Allergies      MEDICATIONS:   --------------------------------------------------------------------------------------  Neurologic Medications  oxyCODONE    Solution 2.5 milliGRAM(s) Oral once    Respiratory Medications  albuterol/ipratropium for Nebulization 3 milliLiter(s) Nebulizer every 6 hours  buDESOnide    Inhalation Suspension 0.5 milliGRAM(s) Inhalation every 12 hours    Cardiovascular Medications  metoprolol tartrate 12.5 milliGRAM(s) Enteral Tube every 8 hours    Gastrointestinal Medications  albumin human  5% IVPB 500 milliLiter(s) IV Intermittent once  pantoprazole  Injectable 40 milliGRAM(s) IV Push daily    Genitourinary Medications    Hematologic/Oncologic Medications  aspirin  chewable 81 milliGRAM(s) Oral daily  heparin  Infusion 1000 Unit(s)/Hr IV Continuous <Continuous>    Antimicrobial/Immunologic Medications  caspofungin IVPB 50 milliGRAM(s) IV Intermittent every 24 hours  caspofungin IVPB      meropenem  IVPB 1000 milliGRAM(s) IV Intermittent every 12 hours  minocycline IVPB      minocycline IVPB 200 milliGRAM(s) IV Intermittent once  minocycline IVPB 200 milliGRAM(s) IV Intermittent every 12 hours  trimethoprim / sulfamethoxazole IVPB 190 milliGRAM(s) IV Intermittent every 24 hours    Endocrine/Metabolic Medications  atorvastatin 80 milliGRAM(s) Oral at bedtime  insulin lispro (ADMELOG) corrective regimen sliding scale   SubCutaneous every 6 hours  insulin NPH human recombinant 15 Unit(s) SubCutaneous every 6 hours  methylPREDNISolone sodium succinate Injectable 32 milliGRAM(s) IV Push <User Schedule>    Topical/Other Medications  chlorhexidine 0.12% Liquid 15 milliLiter(s) Oral Mucosa every 12 hours  chlorhexidine 2% Cloths 1 Application(s) Topical <User Schedule>  chlorhexidine 2% Cloths 1 Application(s) Topical <User Schedule>  nystatin Powder 1 Application(s) Topical every 12 hours    --------------------------------------------------------------------------------------    VITAL SIGNS, INS/OUTS (last 24 hours):  --------------------------------------------------------------------------------------  T(C): 37 (12-20-24 @ 23:00), Max: 37.1 (12-20-24 @ 17:06)  HR: 85 (12-21-24 @ 01:00) (79 - 101)  BP: 117/55 (12-21-24 @ 01:00) (82/52 - 153/80)  RR: 19 (12-21-24 @ 01:00) (14 - 27)  SpO2: 100% (12-21-24 @ 01:00) (97% - 100%)    12-19-24 @ 07:01  -  12-20-24 @ 07:00  --------------------------------------------------------  IN: 4058.7 mL / OUT: 3365 mL / NET: 693.7 mL    12-20-24 @ 07:01  -  12-21-24 @ 01:34  --------------------------------------------------------  IN: 2008 mL / OUT: 1840 mL / NET: 168 mL      --------------------------------------------------------------------------------------    EXAM  NEUROLOGY  Exam: NAD, occasionally following commands    HEENT  Exam: Trach in place, site clean/dry    RESPIRATORY  Exam: Normal expansion/effort.  Mechanical Ventilation: Mode: AC/ CMV (Assist Control/ Continuous Mandatory Ventilation), RR (machine): 14, TV (machine): 500, FiO2: 30, PEEP: 5, ITime: 1, MAP: 6.3, PIP: 9    CARDIOVASCULAR  Exam: Regular rate and rhythm     GI/NUTRITION  Exam: Abdomen soft, Non-tender, Non-distended.     VASCULAR  Exam: Extremities warm, edematous      LABS  --------------------------------------------------------------------------------------                        7.6    7.42  )-----------( 53       ( 20 Dec 2024 23:14 )             22.4   12-20    138  |  101  |  92[H]  ----------------------------<  209[H]  3.8   |  18[L]  |  2.06[H]    Ca    7.4[L]      20 Dec 2024 23:14  Phos  4.8     12-20  Mg     2.2     12-20    TPro  3.8[L]  /  Alb  2.9[L]  /  TBili  10.0[H]  /  DBili  x   /  AST  53[H]  /  ALT  60[H]  /  AlkPhos  135[H]  12-20  Urinalysis Basic - ( 20 Dec 2024 23:14 )    Color: x / Appearance: x / SG: x / pH: x  Gluc: 209 mg/dL / Ketone: x  / Bili: x / Urobili: x   Blood: x / Protein: x / Nitrite: x   Leuk Esterase: x / RBC: x / WBC x   Sq Epi: x / Non Sq Epi: x / Bacteria: x    PT/INR - ( 19 Dec 2024 21:02 )   PT: 18.0 sec;   INR: 1.57 ratio         PTT - ( 21 Dec 2024 00:14 )  PTT:81.5 sec  --------------------------------------------------------------------------------------

## 2024-12-21 NOTE — PROGRESS NOTE ADULT - ASSESSMENT
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   s/p OLT 11/15 with complicated post op course    [] Septic shock/Cardiogenic shock  [] s/p Colectomy 12/7 POD# 14  [] RTOR for closure and Ileostomy creation   [] IAPB 12/7- removed 12/10: CTICU on board  - E coli Bacteremia (12/6) - on jeniffer/caspo -> switched to  cefepime/fluc. Completed 7days of empiric flagyl.  Received Tobra x 1 12/6 .   - Tx ID following - c/w Jeniffer/Caspo  - Minocycline 200 mg IV BID and bactrim for stenotrophomonas in trach specimen  - Neutropenia: received Neupogen 480mcg x2  - MORAIMA: CRRT started 12/7, stopped 12/15, HD 12/20  - AMS; CT Head neg  - scrotal edema: repeat scrotal us; neg for abscess  - Hold diuretics   - BCx (12/16): no growth to date and UA (12/16) +nitrite and leukocyte esterase   - On meropenem and caspofungin as per ID  - Vitamin K x 3 days 12/16-12/19  - S/P tracheotomy 12/17    [] s/p OLT POD #36  - trend LFT's  - Diet: increase TFs as needed  - Pain management: avoid narcotics  - Strict I&Os, nichols, wound vac placed 12/10   - US: L brachial DVT   - wound vac exchange for ileostomy (12/13)  - HIT panel neg (12/14)  - Recultured (tracheal aspirate showing GNR and yeast like cells)    [ ] Immunosuppression  - Tacrolimus stopped 11/18 in setting of AMS/Seizure, Started Cyclo 12/17  - Cyclo by level, MMF held, Solumedrol 32 mg daily  - PPx: Gancyclovir (HELD) in setting of thrombocytopenia    [] lleus   -@ home on Linzess  - imaging with distended colon (likely opioid induced)  - s/p Neostigmine x 2  - s/p Relistor, last dose 12/1  - s/p colectomy with end ileostomy  (see above)  - Daily AXR     [] CMV viremia  - d/c gancylovir due to thrombocytopenia  - CMV PCR (12/11 and 12/16): neg    [ ] AMS  - Reintubated 11/20 Aspiration/hypoxia, extubated 11/24->[id number]/24--> extubated 11/26 -> [id number] -> tracheostomy 12/17  - EEG 11/30 negative, Neurology following  - BH following   - off tacro  - on keppra  -CT Head No Cont (12/20): Age-indeterminate posterior left frontal lobe infarct. MRI is recommended for further evaluation. Mild sinusitis-->Neurology following    [ ] HTN/ pAFib  [ ] coronary vasospasm vs posterior wall MI  - on hep gtt, goal PTT 50-60  - ASA  - Cards- plan for PCI prior to DC   - Off Amiodarone, off dobutamine  - c/w metoprolol  - Eliquis 5mg bid - held 12/6.   - Dr. Quintanilla following    [ ] DM  - ISS     []Scrotal abscess   - US (12/12): 2.1 x0.9 x 3.2 cm focal, right testicle- possible abscess (12/12)  - Urology c/s recs nothing to do, repeat ultrasound   - 12/15 CT CAP no acute changes

## 2024-12-21 NOTE — PROGRESS NOTE ADULT - ASSESSMENT
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 Patient's post-op course has been c/b multiple reintubation, required total colectomy w/ ileostomy, and IABP placement 2/2 cardiogenic shock with subsequent removal.     Neuro:  - Opens eyes, Not following commands, off sedation  - pain control w/ oxycodone  - CT head 11/19, 11/21, 11/25, 11/29, 12/5 negative  - EEG: Mild diffuse cerebral dysfunction that is not specific in etiology. No epileptic discharges recorded. No seizures recorded.  - Holding home xanax, Abilify, Zoloft, Remeron, Lexapro  - CT head 12/20 showing age-indeterminate infarct, f/u Neurology recs    Resp:  -S/p bedside tracheostomy 12/17  -PRVC 14/500/5/30  -SBT daily  -c/w inhaled bronchodilator  -VBG daily    CV:  -IABP removed 12/10  -s/p dobutamine and amiodarone gtt   -home metoprolol 12.5 q8hr  -trend lactate 2.7 (12/18)  -TTE 12/11 shows EF of 55-60%  - +/- levo  - narrow complex tachycardia, treating as ACS with hep gtt and ASA   - trending troponin    GI:  -trend LFTs  -NPO w/ tube feeds, KO tube post pyloric  -protonix QD  -high output from ALYSSA drains > repletions ordered    /Renal:  -off CRRT since 12/15, did not respond to lasixs afterwards  -last HD 12/18  -shiley now removed 12/19 for line holiday given fever  -Monitor BUN/Cr, I&Os, trend UOP  -Scrotal US 12/15 -> edema, no abscess -> elevate  -nichols    Heme:  -trend H/H  -monitor coags  -trend platelets  -hep gtt goal 50-60    ID:  -ABx transition to jeniffer, caspo (12/16-), bactrim, minocycline  - 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

## 2024-12-22 NOTE — CHART NOTE - NSCHARTNOTEFT_GEN_A_CORE
Called by team to address the up-trending troponin.    Patient has an extended hospital course with multiple comorbidities. He is being followed by Dr. Mitesh Quintanilla for cardiac care. In brief, this is a 73 y/o male with PMHx of non-obstructive CAD (mid-RCA ~50%, mid-LCx ~50-60%), HTN, DM, paroxysmal Afib s/p ablation (not on AC due to ?thrombocytopenia), GONZALES cirrhosis who underwent a OLT on 11/15/24. Post-op course was complicated by multiple intubations (now on tracheostomy to vent), E.coli bacteremia, bowel ischemia s/p resection and ileostomy, ?cardiogenic shock requiring IABP eptic shock 2/2 E.coli bacteremia requiring alexis Called by team to address the up-trending troponin:    Patient has an extended hospital course with multiple comorbidities. He is being followed by Dr. Mitesh Quintanilla for cardiac care.     In brief, this is a 75 y/o male with PMHx of non-obstructive CAD (mid-RCA ~50%, mid-LCx ~50-60%), HTN, DM, paroxysmal Afib s/p ablation (not on AC due to ?thrombocytopenia), GONZALES cirrhosis who underwent a OLT on 11/15/24. Post-op course was complicated by ischemic bowel s/p total colectomy (12/7), septic shock 2/2 E.coli bacteremia, cardiogenic shock (briefly on dobutamine and IABP 12/7 - 12/10), multiple intubations (now on tracheostomy to vent), renal failure requiring CRRT (now on intermittent HD).     Per chart review, Dr. Quintanilla had recommended starting IV heparin for treatment of presumed acute coronary syndrome (2/2 vasospasm vs occlusion) due to dynamic EKG changes. He was briefly started on IV heparin, but it was stopped due to a drop in hemoglobin (~6.6) requiring blood transfusion.     On my evaluation today, patient awakens to voice, but does not reliably follow commands. He cannot reliably answer my questions. He is on trach to vent. (+) abdominal drains. (+) wound vac. RRR, no significant murmurs. (+) Vent sounds.     His BP ~100/60, HR ~100-110. Sat is ~100% on PRVC mechanical ventilation (FiO2 30%, PEEP 5). No pressors.    His EKG shows sinus tachycardia with non-specific T-wave changes. Telemetry shows sinus tachy with intermittent episodes of AFib with HR ~110-120.    Labs notable for:  trop 220 -> 256 -> 274 -> 321 -> 406 -> 443.   Hgb 10  Plt 61  Cr 2.02 (on iHD)  anti-PF4 negative    TTE shows grossly normal LV function (unchanged from prior)    RECOMMENDATIONS:  - Based on current assessment, there is low concern for acute coronary syndrome. Elevated troponin likely 2/2 demand ischemia and poor renal clearance. No obvious ischemic changes seen on EKG  - Given elevated CHADsVASc, patient should be on therapeutic anticoagulation for Atrial fibrillation (IF deemed safe from surgery perspective)  - at the very least, should continue DVT prophylaxis with heparin  - continue metoprolol as tolerated          Discussed case with Dr. David Marie Called by team to address the up-trending troponin:    Patient has an extended hospital course with multiple comorbidities. He is being followed by Dr. Mitesh Quintanilla for cardiac care.     In brief, this is a 75 y/o male with PMHx of non-obstructive CAD (mid-RCA ~50%, mid-LCx ~50-60%), HTN, DM, paroxysmal Afib s/p ablation (not on AC due to ?thrombocytopenia), GONZALES cirrhosis who underwent a OLT on 11/15/24. Post-op course was complicated by ischemic bowel s/p total colectomy (12/7), septic shock 2/2 E.coli bacteremia, cardiogenic shock (briefly on dobutamine and IABP 12/7 - 12/10), multiple intubations (now on tracheostomy to vent), renal failure requiring CRRT (now on intermittent HD).     Per chart review, Dr. Quintanilla had recommended starting IV heparin for treatment of presumed acute coronary syndrome (2/2 vasospasm vs occlusion) due to dynamic EKG changes. He was briefly started on IV heparin, but it was stopped due to a drop in hemoglobin (~6.6) requiring blood transfusion.     On my evaluation today, patient awakens to voice, but does not reliably follow commands. He cannot reliably answer my questions. He is on trach to vent. (+) abdominal drains. (+) wound vac. RRR, no significant murmurs. (+) Vent sounds.     His BP ~100/60, HR ~100-110. Sat is ~100% on PRVC mechanical ventilation (FiO2 30%, PEEP 5). No pressors.    His EKG shows sinus tachycardia with non-specific T-wave changes. Telemetry shows sinus tachy with intermittent episodes of AFib with HR ~110-120.    Labs notable for:  trop 220 -> 256 -> 274 -> 321 -> 406 -> 443.   Hgb 10  Plt 61  Cr 2.02 (on iHD)  anti-PF4 negative    TTE shows grossly normal LV function (unchanged from prior)    RECOMMENDATIONS:  - Based on current assessment, there is low concern for acute coronary syndrome. Elevated troponin likely 2/2 demand ischemia and poor renal clearance. No obvious ischemic changes seen on EKG  - trend troponin to peak  - serial EKGs  - Given elevated CHADsVASc, patient should be on therapeutic anticoagulation for Atrial fibrillation (IF deemed safe from surgery perspective)  - at the very least, should continue DVT prophylaxis with heparin  - continue metoprolol as tolerated          Discussed case with Dr. David Marie

## 2024-12-22 NOTE — PROGRESS NOTE ADULT - ASSESSMENT
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 Patient's post-op course has been c/b multiple reintubation, required total colectomy w/ ileostomy, and IABP placement 2/2 cardiogenic shock with subsequent removal.     INTERVAL EVENTS:  - No need for MRI   - D/cd Hep Drip  - Hgb 6/6 got 2u pRBC  - Protonix BID added  - Repeat TTE ordered  - HD (12/21) with 1L removed  - Tolerated all day CPAP (12/21)    Neuro:  - Opens eyes, Not following commands, off sedation  - pain control w/ oxycodone  - CT head 11/19, 11/21, 11/25, 11/29, 12/5 negative  - EEG: Mild diffuse cerebral dysfunction that is not specific in etiology. No epileptic discharges recorded. No seizures recorded.  - Holding home xanax, Abilify, Zoloft, Remeron, Lexapro  - CT head 12/20 showing age-indeterminate infarct, f/u Neurology recs  - No need for MRI     Resp:  -S/p bedside tracheostomy 12/17  -PRVC 14/500/5/30  -Tolerated all day CPAP (12/21)  -SBT daily  -c/w inhaled bronchodilator  -VBG daily    CV:  -IABP removed 12/10  -s/p dobutamine and amiodarone gtt   -home metoprolol 12.5 q8hr  -trend lactate 2.7 (12/18)  -TTE 12/11 shows EF of 55-60%  - +/- levo  - trending troponin    GI:  -trend LFTs  -NPO w/ tube feeds, KO tube post pyloric  -protonix QD  -high output from ALYSSA drains > repletions ordered    /Renal:  -HD (12/20, 12/21) via R IJ shiley   -Monitor BUN/Cr, I&Os, trend UOP  -Scrotal US 12/15 -> edema, no abscess -> elevate  -nichols    Heme:  -trend H/H  -monitor coags  -trend platelets  -hep gtt goal 50-60    ID:  -ABx transition to jeniffer, caspo (12/16-), bactrim, minocycline  - 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

## 2024-12-22 NOTE — PROGRESS NOTE ADULT - SUBJECTIVE AND OBJECTIVE BOX
INFECTIOUS DISEASES FOLLOW UP-- Renée Carrero  [phone]    This is a follow up note for this  74yMale with  Status post liver transplantation    opens eyes to name    ROS:  CONSTITUTIONAL: opens eyes to stimuli and name    Allergies    No Known Allergies    Intolerances        ANTIBIOTICS/RELEVANT:  antimicrobials  caspofungin IVPB 50 milliGRAM(s) IV Intermittent every 24 hours  caspofungin IVPB      meropenem  IVPB 1000 milliGRAM(s) IV Intermittent every 12 hours  minocycline IVPB      minocycline IVPB 200 milliGRAM(s) IV Intermittent every 12 hours  trimethoprim / sulfamethoxazole IVPB 190 milliGRAM(s) IV Intermittent every 24 hours    immunologic:    OTHER:  albuterol/ipratropium for Nebulization 3 milliLiter(s) Nebulizer every 6 hours  aspirin  chewable 81 milliGRAM(s) Oral daily  atorvastatin 80 milliGRAM(s) Oral at bedtime  buDESOnide    Inhalation Suspension 0.5 milliGRAM(s) Inhalation every 12 hours  chlorhexidine 0.12% Liquid 15 milliLiter(s) Oral Mucosa every 12 hours  chlorhexidine 2% Cloths 1 Application(s) Topical <User Schedule>  fentaNYL   Patch  12 MICROgram(s)/Hr. 1 Patch Transdermal every 48 hours  heparin   Injectable 5000 Unit(s) SubCutaneous every 12 hours  insulin lispro (ADMELOG) corrective regimen sliding scale   SubCutaneous every 6 hours  insulin NPH human recombinant 18 Unit(s) SubCutaneous every 6 hours  methylPREDNISolone sodium succinate Injectable 32 milliGRAM(s) IV Push <User Schedule>  metoprolol tartrate 12.5 milliGRAM(s) Enteral Tube every 8 hours  norepinephrine Infusion 0.03 MICROgram(s)/kG/Min IV Continuous <Continuous>  nystatin Powder 1 Application(s) Topical every 12 hours  pantoprazole  Injectable 40 milliGRAM(s) IV Push every 12 hours      Objective:  Vital Signs Last 24 Hrs  T(C): 37.3 (22 Dec 2024 11:00), Max: 37.7 (22 Dec 2024 03:00)  T(F): 99.1 (22 Dec 2024 11:00), Max: 99.9 (22 Dec 2024 03:00)  HR: 102 (22 Dec 2024 15:34) (90 - 113)  BP: 102/56 (22 Dec 2024 15:34) (72/42 - 119/62)  BP(mean): 74 (22 Dec 2024 15:34) (52 - 87)  RR: 26 (22 Dec 2024 15:34) (19 - 64)  SpO2: 97% (22 Dec 2024 15:34) (96% - 100%)    Parameters below as of 22 Dec 2024 07:00  Patient On (Oxygen Delivery Method): ventilator    O2 Concentration (%): 30    PHYSICAL EXAM:  Constitutional:low grade fever  Eyes:DUSTIN, EOMI  Ear/Nose/Throat: no oral lesions, trach- secretions foul smelling	  Respiratory: audible anteriorly  Cardiovascular: S1S2  Gastrointestinal:oltx wound vac overlying ostomy pink fstool in bag, drains bilateral with yellowish output  Extremities:no e/e/c  No Lymphadenopathy  IV sites not inflammed.    LABS:                        9.1    10.01 )-----------( 67       ( 22 Dec 2024 12:36 )             26.9     12-22    134[L]  |  98  |  91[H]  ----------------------------<  205[H]  4.0   |  19[L]  |  2.02[H]    Ca    8.1[L]      22 Dec 2024 12:36  Phos  4.8     12-22  Mg     2.2     12-22    TPro  4.1[L]  /  Alb  3.0[L]  /  TBili  8.9[H]  /  DBili  x   /  AST  75[H]  /  ALT  66[H]  /  AlkPhos  131[H]  12-22    PT/INR - ( 22 Dec 2024 06:17 )   PT: 15.3 sec;   INR: 1.33 ratio         PTT - ( 22 Dec 2024 06:17 )  PTT:57.8 sec  Urinalysis Basic - ( 22 Dec 2024 12:36 )    Color: x / Appearance: x / SG: x / pH: x  Gluc: 205 mg/dL / Ketone: x  / Bili: x / Urobili: x   Blood: x / Protein: x / Nitrite: x   Leuk Esterase: x / RBC: x / WBC x   Sq Epi: x / Non Sq Epi: x / Bacteria: x        MICROBIOLOGY:            RECENT CULTURES:  12-20 @ 18:23  Abdominal Fl  --  --  --    Rare Enterococcus faecalis  --  12-19 @ 14:31  Trach Asp Tracheal Aspirate  Stenotrophomonas maltophilia  Stenotrophomonas maltophilia  ESAU    Moderate Stenotrophomonas maltophilia  Commensal charity consistent with body site  --  12-19 @ 03:28  .Blood BLOOD  --  --  --    No growth at 72 Hours  --  12-16 @ 13:10  Catheterized Catheterized  Enterococcus faecalis  Enterococcus faecalis  ESAU    10,000 - 49,000 CFU/mL Enterococcus faecalis  --  12-16 @ 13:00  .Blood BLOOD  --  --  --    No growth at 5 days  --  12-16 @ 08:36  Combi-Cath  --  --  --    Commensal charity consistent with body site  --  12-16 @ 07:45  .Blood BLOOD  --  --  --    No growth at 5 days  --      RADIOLOGY & ADDITIONAL STUDIES:    < from: Xray Chest 1 View- PORTABLE-Routine (Xray Chest 1 View- PORTABLE-Routine in AM.) (12.21.24 @ 07:00) >  IMPRESSION:  Interval removal of left IJ CVC. Right IJ CVC with tip overlying the SVC.   Enteric tube courses below the diaphragm with tip collimated out of view   in the final image.  Bibasilar atelectasis.    < end of copied text >

## 2024-12-22 NOTE — PROGRESS NOTE ADULT - SUBJECTIVE AND OBJECTIVE BOX
Transplant Surgery - Multidisciplinary Rounds  --------------------------------------------------------------  OLT   11/15/2024        POD#37  Colectomy 2024   POD#15  IABP  removed 12/10  Tracheostomy 2024    Present:   Patient seen and examined with multidisciplinary Transplant team including Surgeon: Dr. Vora, Dr. Sorto, JAZZ Vegas, Hepatologist: Dr. Hidalgo and ICU team in AM rounds.   Disciplines not in attendance will be notified of the plan.     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     s/p OLT 11/15 with post op course c/b:  Hypoxia: Reintubated , extubated ->reintubated , extubated , reintubated   Ileus   Fever  A fib  AMS/Seizure   L brachial DVT  Neutropenia  E coli Bacteremia ()  s/p Coloctomy .   IABP , removed 12/10    Interval/Overnight Events:   - afebrile  - on levo 0.032, no vaso, hypotensive to 70's/40's   -Last HD   - No chest pain: Troponin uptrendin  - started on cyclo 25QD and MMF 250QD    Immunosuppression:  -Cyclo by level , MMF held, Solu 32  -ongoing monitoring for signs of rejection    MEDICATIONS  (STANDING):  albuterol/ipratropium for Nebulization 3 milliLiter(s) Nebulizer every 6 hours  aspirin  chewable 81 milliGRAM(s) Oral daily  atorvastatin 80 milliGRAM(s) Oral at bedtime  buDESOnide    Inhalation Suspension 0.5 milliGRAM(s) Inhalation every 12 hours  caspofungin IVPB 50 milliGRAM(s) IV Intermittent every 24 hours  caspofungin IVPB      chlorhexidine 0.12% Liquid 15 milliLiter(s) Oral Mucosa every 12 hours  chlorhexidine 2% Cloths 1 Application(s) Topical <User Schedule>  fentaNYL   Patch  12 MICROgram(s)/Hr. 1 Patch Transdermal every 48 hours  heparin   Injectable 5000 Unit(s) SubCutaneous every 12 hours  insulin lispro (ADMELOG) corrective regimen sliding scale   SubCutaneous every 6 hours  insulin NPH human recombinant 18 Unit(s) SubCutaneous every 6 hours  meropenem  IVPB 1000 milliGRAM(s) IV Intermittent every 12 hours  methylPREDNISolone sodium succinate Injectable 32 milliGRAM(s) IV Push <User Schedule>  metoprolol tartrate 12.5 milliGRAM(s) Enteral Tube every 8 hours  minocycline IVPB      minocycline IVPB 200 milliGRAM(s) IV Intermittent every 12 hours  norepinephrine Infusion 0.03 MICROgram(s)/kG/Min (5.27 mL/Hr) IV Continuous <Continuous>  nystatin Powder 1 Application(s) Topical every 12 hours  pantoprazole  Injectable 40 milliGRAM(s) IV Push every 12 hours  trimethoprim / sulfamethoxazole IVPB 190 milliGRAM(s) IV Intermittent every 24 hours    MEDICATIONS  (PRN):    PAST MEDICAL & SURGICAL HISTORY:  Diabetes    Transaminitis    Paroxysmal atrial fibrillation    Depression    BPH (benign prostatic hyperplasia)    Hypertension    Chronic atrial fibrillation    Coronary artery disease    Hepatocellular carcinoma    DM (diabetes mellitus)    HTN (hypertension)    Paroxysmal atrial fibrillation    Cirrhosis    HCC (hepatocellular carcinoma)    History of BPH    History of laparoscopic cholecystectomy    History of lumbar laminectomy    H/O prior ablation treatment    H/O percutaneous left heart catheterization    Vital Signs Last 24 Hrs  T(C): 37.2 (22 Dec 2024 15:00), Max: 37.7 (22 Dec 2024 03:00)  T(F): 99 (22 Dec 2024 15:00), Max: 99.9 (22 Dec 2024 03:00)  HR: 100 (22 Dec 2024 16:00) (90 - 113)  BP: 94/53 (22 Dec 2024 16:00) (72/42 - 119/62)  BP(mean): 63 (22 Dec 2024 16:00) (52 - 87)  RR: 36 (22 Dec 2024 16:00) (19 - 64)  SpO2: 98% (22 Dec 2024 16:00) (96% - 100%)    Parameters below as of 22 Dec 2024 07:00  Patient On (Oxygen Delivery Method): ventilator    O2 Concentration (%): 30    I&O's Summary    21 Dec 2024 07:01  -  22 Dec 2024 07:00  --------------------------------------------------------  IN: 3208.5 mL / OUT: 2390 mL / NET: 818.5 mL    22 Dec 2024 07:01  -  22 Dec 2024 16:55  --------------------------------------------------------  IN: 1373 mL / OUT: 20 mL / NET: 1353 mL                          9.1    10.01 )-----------( 67       ( 22 Dec 2024 12:36 )             26.9         134[L]  |  98  |  91[H]  ----------------------------<  205[H]  4.0   |  19[L]  |  2.02[H]    Ca    8.1[L]      22 Dec 2024 12:36  Phos  4.8       Mg     2.2         TPro  4.1[L]  /  Alb  3.0[L]  /  TBili  8.9[H]  /  DBili  x   /  AST  75[H]  /  ALT  66[H]  /  AlkPhos  131[H]      Culture - Body Fluid with Gram Stain (collected 24 @ 18:23)  Source: Abdominal Fl  Gram Stain (24 @ 05:56):    polymorphonuclear leukocytes seen    Gram positive cocci in pairs seen    by cytocentrifuge  Preliminary Report (24 @ 21:12):    Rare Enterococcus faecalis    Culture - Sputum (collected 24 @ 14:31)  Source: Trach Asp Tracheal Aspirate  Gram Stain (24 @ 00:18):    Moderate polymorphonuclear leukocytes per low power field    Rare Squamous epithelial cells per low power field    Numerous Gram Negative Rods per oil power field    Numerous Yeast like cells per oil power field  Final Report (24 @ 14:57):    Moderate Stenotrophomonas maltophilia    Commensal charity consistent with body site  Organism: Stenotrophomonas maltophilia (24 @ 14:57)  Organism: Stenotrophomonas maltophilia (24 @ 14:57)  Organism: Stenotrophomonas maltophilia (24 @ 14:57)    Culture - Blood (collected 24 @ 03:28)  Source: .Blood BLOOD  Preliminary Report (24 @ 07:01):    No growth at 72 Hours    Culture - Blood (collected 24 @ 03:28)  Source: .Blood BLOOD  Preliminary Report (24 @ 07:01):    No growth at 72 Hours    Culture - Urine (collected 24 @ 13:10)  Source: Catheterized Catheterized  Final Report (24 @ 07:41):    10,000 - 49,000 CFU/mL Enterococcus faecalis  Organism: Enterococcus faecalis (24 @ 07:41)  Organism: Enterococcus faecalis (24 @ 07:41)    Culture - Blood (collected 24 @ 13:00)  Source: .Blood BLOOD  Final Report (24 @ 18:00):    No growth at 5 days    Culture - Bronchial (collected 24 @ 08:36)  Source: Combi-Cath  Gram Stain (24 @ 16:27):    No polymorphonuclear leukocytes seen per low power field    No Squamous epithelial cells seen per low power field    No organisms seen per oil power field  Final Report (24 @ 23:17):    Commensal charity consistent with body site    Culture - Blood (collected 24 @ 07:45)  Source: .Blood BLOOD  Final Report (24 @ 12:01):    No growth at 5 days    ROS: Unable to assess patient is lethargic, s/p tracheostomy    PHYSICAL EXAM:   Constitutional: trach to vent, lethargic  Eyes:  PERRLA  ENMT: nc/at, no thrush   Neck: supple   Respiratory: CTA B/L  Cardiovascular: RRR  Gastrointestinal: incision clean/dry/intact + Ostomy pink   Genitourinary: +scrotal edema, Castro in place  Extremities: SCD's in place and working bilaterally, + LE edema  Neurological: trach to vent , sedated   Skin: no rashes, ulcerations, lesions

## 2024-12-22 NOTE — PROGRESS NOTE ADULT - ASSESSMENT
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   s/p OLT 11/15 with complicated post op course    [] Septic shock/Cardiogenic shock  [] s/p Colectomy 12/7 POD# 15  [] RTOR for closure and Ileostomy creation   [] IAPB 12/7- removed 12/10: CTICU on board  - E coli Bacteremia (12/6) - on jeniffer/caspo -> switched to  cefepime/fluc. Completed 7days of empiric flagyl.  Received Tobra x 1 12/6 .   - Tx ID following - c/w Jeniffer/Caspo/Minocycline  - Neutropenia: received Neupogen 480mcg x2  - MORAIMA: CRRT started 12/7, stopped 12/15, HD 12/20  - AMS; CT Head neg  - scrotal edema: repeat scrotal us; neg for abscess  - Hold diuretics   - BCx (12/16): no growth to date and UA (12/16) +nitrite and leukocyte esterase   - On meropenem and caspofungin as per ID  - Vitamin K x 3 days 12/16-12/19  - S/P tracheotomy 12/17    [] s/p OLT POD #37  - trend LFT's  - Diet: increase TFs as needed  - Pain management: avoid narcotics  - Strict I&Os, nihcols, wound vac placed 12/10   - US: L brachial DVT   - wound vac exchange for ileostomy (12/13)  - HIT panel neg (12/14)  - Recultured (tracheal aspirate showing GNR and yeast like cells)    [ ] Immunosuppression  - Tacrolimus stopped 11/18 in setting of AMS/Seizure, Started Cyclo 12/17  - Cyclo by level, MMF 250QD, Solumedrol 32 mg daily  - PPx: Gancyclovir (HELD) in setting of thrombocytopenia    [] lleus   -@ home on Linzess  - imaging with distended colon (likely opioid induced)  - s/p Neostigmine x 2  - s/p Relistor, last dose 12/1  - s/p colectomy with end ileostomy  (see above)  - Daily AXR     [] CMV viremia  - d/c gancylovir due to thrombocytopenia  - CMV PCR (12/11 and 12/16): neg    [ ] AMS  - Reintubated 11/20 Aspiration/hypoxia, extubated 11/24->[id number]/24--> extubated 11/26 -> [id number]/7 -> tracheostomy 12/17  - EEG 11/30 negative, Neurology following  - BH following   - off tacro  - on keppra  -CT Head No Cont (12/20): Age-indeterminate posterior left frontal lobe infarct. No need for MRI.    [ ] HTN/ pAFib  [ ] coronary vasospasm vs posterior wall MI  - restarted on hep gtt, goal PTT 50-60  - ASA  - Cards- plan for PCI prior to DC   - Off Amiodarone, off dobutamine  - c/w metoprolol  - Eliquis 5mg bid - held 12/6.   - Dr. Quintanilla following    [ ] DM  - ISS     []Scrotal abscess   - US (12/12): 2.1 x0.9 x 3.2 cm focal, right testicle- possible abscess (12/12)  - Urology c/s recs nothing to do, repeat ultrasound   - 12/15 CT CAP no acute changes

## 2024-12-22 NOTE — PROGRESS NOTE ADULT - ASSESSMENT
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 Post op course complicated by acute hypoxic respiratory failure requiring intubation multiple times, most recently on 12/7 with conversion to tracheostomy on 12/17, e.coli bacteremia with RTOR on 12/7 for concern for worsening septic shock and cardiogenic shock s/p balloon pump placement and total abdominal colectomy in setting of ischemic bowel s/p OR on 12/9 for ileostomy creation, and olirugirc MORAIMA requiring CRRT and now intermittent HD. ID called back for fever spike yesterday morning and overnight with possible rapid heart rate and troponin leak being treated for acute cardiac event       Abx:   Caspofungin ( 12/6-12/11, 12/17-)  Meropenem ( 11/22-11/29, 11/22-11/29, 12/16-)   Fluconazole (11/16-12/2, 12/12-12/16)   Cefepime (12/10-12/16)   Metronidazole (12/10-12/14)   Ganciclovir (12/7-12/13)   Linezolid (11/23-11/26, 12/6-12/11)   Bactrim (11/16-11/24, 12/8-12/11)   Atovaquone (11/29-12/6)   Zosyn (11/20-11/22,12/6)   Tobramycin (12/6)   Valganciclovir (11/6-12/4)    # s/p OLTx 11/15  # inability jia wean s/p tracheostomy placement  # Ileus/ischemic bowel s/p colectomy on 12/16 and ileostomy creation on 12/9   # severe septic shock 12/6 and pancytopenia, lactic acidosis  # Ecoli bacteremia on 12/6 blood cultures  in context of above (repeat blood cultures since cleared)   # cardiogenic shock s/p impella now removed   # acute hypoxic respiratory failure with b/l pleural effusions (CT chest on 12/15)  #mild intrahepatic biliary ductal dilatation and Septated collection within the gallbladder fossa (seen on 12/14 US liver)  #Testicular Erythema/Edema (no fluid collection on 12/15 testicular US; HSV/VZV PCR of lesion negative)  #pyuria with recent urine culture on 12/16 with e. fecalis ( 10,000-49,000 e. fecalis)   # 12/20 ascitic fluid growing gram+ cocci in pairs    --Continue Meropenem  --Continue Caspofungin  -E.faecalis in urine is sensitive to Vancomycin and can add that but will need to follow up to verify that similar organism in the ascitic fluid given one dose and follow up level   Vancomycin Level, Random: 11.4:  can redose  -stenotrophomonas in trach specimen with purulent secretions started on minocycline plus bactrim  -CMV repeat viral load on 12/22 he was non detectable on 12/16      Discussed with SICU team    Wes Carrero MD  Can be called via Teams  After 5pm/weekends [phone]

## 2024-12-22 NOTE — PROGRESS NOTE ADULT - SUBJECTIVE AND OBJECTIVE BOX
SICU PROGRESS NOTE:    24 HOUR INTERVAL EVENTS:  INTERVAL EVENTS:  - No need for MRI   - D/cd Hep Drip  - Hgb 6/6 got 2u pRBC  - Protonix BID added  - Repeat TTE ordered  - HD (12/21) with 1L removed  - Tolerated all day CPAP (12/21)      SUBJECTIVE/ROS:  [ ] A ten-point review of systems was otherwise negative except as noted.  [ ] Due to altered mental status/intubation, subjective information were not able to be obtained from the patient. History was obtained, to the extent possible, from review of the chart and collateral sources of information.    NEURO  RASS:     GCS:     CAM ICU:  Exam: awake, alert, oriented  Meds: oxyCODONE    Solution 2.5 milliGRAM(s) Oral once    [x] Adequacy of sedation and pain control has been assessed and adjusted    RESPIRATORY  RR: 24 (12-22-24 @ 00:00) (16 - 26)  SpO2: 99% (12-22-24 @ 00:00) (97% - 100%)  Wt(kg): --  Exam: unlabored, clear to auscultation bilaterally  Mechanical Ventilation: Mode: AC/ CMV (Assist Control/ Continuous Mandatory Ventilation), RR (machine): 14, RR (patient): 24, TV (machine): 500, FiO2: 30, PEEP: 5, ITime: 1, MAP: 8.2, PIP: 16    [N/A] Extubation Readiness Assessed  Meds: albuterol/ipratropium for Nebulization 3 milliLiter(s) Nebulizer every 6 hours  buDESOnide    Inhalation Suspension 0.5 milliGRAM(s) Inhalation every 12 hours      CARDIOVASCULAR  HR: 98 (12-22-24 @ 00:00) (79 - 99)  BP: 104/58 (12-22-24 @ 00:00) (91/59 - 122/58)  BP(mean): 77 (12-22-24 @ 00:00) (65 - 87)  ABP: --  ABP(mean): --  Wt(kg): --  CVP(cm H2O): --  VBG - ( 21 Dec 2024 23:58 )  pH: 7.37  /  pCO2: 38    /  pO2: 43    / HCO3: 22    / Base Excess: -3.0  /  SaO2: 70.6   Lactate: 2.9                Exam: regular rate and rhythm  Cardiac Rhythm: sinus  Perfusion     [x]Adequate   [ ]Inadequate  Mentation   [x]Normal       [ ]Reduced  Extremities  [x]Warm         [ ]Cool  Volume Status [ ]Hypervolemic [x]Euvolemic [ ]Hypovolemic  Meds: metoprolol tartrate 12.5 milliGRAM(s) Enteral Tube every 8 hours      GI/NUTRITION  Exam: soft, nontender, nondistended, incision C/D/I  Diet:  Meds: pantoprazole  Injectable 40 milliGRAM(s) IV Push every 12 hours      GENITOURINARY  I&O's Detail    12-20 @ 07:01  -  12-21 @ 07:00  --------------------------------------------------------  IN:    Albumin 5%  - 500 mL: 500 mL    Enteral Tube Flush: 230 mL    Heparin: 38.5 mL    Heparin: 195.5 mL    IV PiggyBack: 1060 mL    Nepro with Carb Steady: 1320 mL  Total IN: 3344 mL    OUT:    Bulb (mL): 250 mL    Bulb (mL): 750 mL    Ileostomy (mL): 650 mL    Indwelling Catheter - Urethral (mL): 890 mL    Other (mL): 0 mL    VAC (Vacuum Assisted Closure) System (mL): 0 mL  Total OUT: 2540 mL    Total NET: 804 mL      12-21 @ 07:01  -  12-22 @ 00:46  --------------------------------------------------------  IN:    Enteral Tube Flush: 100 mL    Heparin: 23.5 mL    IV PiggyBack: 850 mL    Nepro with Carb Steady: 880 mL    PRBCs (Packed Red Blood Cells): 600 mL  Total IN: 2453.5 mL    OUT:    Bulb (mL): 250 mL    Bulb (mL): 50 mL    Ileostomy (mL): 400 mL    Indwelling Catheter - Urethral (mL): 180 mL    Other (mL): 1000 mL    VAC (Vacuum Assisted Closure) System (mL): 150 mL  Total OUT: 2030 mL    Total NET: 423.5 mL          12-22    136  |  100  |  75[H]  ----------------------------<  244[H]  3.9   |  19[L]  |  1.77[H]    Ca    8.3[L]      22 Dec 2024 00:16  Phos  4.3     12-22  Mg     2.1     12-22    TPro  4.1[L]  /  Alb  3.0[L]  /  TBili  11.2[H]  /  DBili  x   /  AST  62[H]  /  ALT  63[H]  /  AlkPhos  153[H]  12-22    [ ] Castro catheter, indication: N/A  Meds: albumin human  5% IVPB 500 milliLiter(s) IV Intermittent once      HEMATOLOGIC  Meds: aspirin  chewable 81 milliGRAM(s) Oral daily    [x] VTE Prophylaxis                        10.0   7.54  )-----------( 55       ( 22 Dec 2024 00:16 )             29.3     PT/INR - ( 21 Dec 2024 06:29 )   PT: 17.3 sec;   INR: 1.52 ratio         PTT - ( 22 Dec 2024 00:16 )  PTT:53.5 sec  Transfusion     [ ] PRBC   [ ] Platelets   [ ] FFP   [ ] Cryoprecipitate    INFECTIOUS DISEASES  WBC Count: 7.54 K/uL (12-22 @ 00:16)  WBC Count: 6.42 K/uL (12-21 @ 20:01)  WBC Count: 7.53 K/uL (12-21 @ 13:39)  WBC Count: 5.08 K/uL (12-21 @ 06:28)    RECENT CULTURES:  Specimen Source: Abdominal Fl  Date/Time: 12-20 @ 18:23  Culture Results:   Rare Enterococcus faecalis[!]  Gram Stain:   polymorphonuclear leukocytes seen  Gram positive cocci in pairs seen  by cytocentrifuge[!]  Organism: --  Specimen Source: Trach Asp Tracheal Aspirate  Date/Time: 12-19 @ 14:31  Culture Results:   Moderate Stenotrophomonas maltophilia  Commensal charity consistent with body site[!]  Gram Stain:   Moderate polymorphonuclear leukocytes per low power field  Rare Squamous epithelial cells per low power field  Numerous Gram Negative Rods per oil power field  Numerous Yeast like cells per oil power field[!]  Organism: Stenotrophomonas maltophilia[!]  Specimen Source: .Blood BLOOD  Date/Time: 12-19 @ 03:28  Culture Results:   No growth at 48 Hours  Gram Stain: --  Organism: --    Meds: caspofungin IVPB      caspofungin IVPB 50 milliGRAM(s) IV Intermittent every 24 hours  cycloSPORINE  , modified (GENGRAF) Solution 25 milliGRAM(s) Oral <User Schedule>  meropenem  IVPB 1000 milliGRAM(s) IV Intermittent every 12 hours  minocycline IVPB      minocycline IVPB 200 milliGRAM(s) IV Intermittent every 12 hours  trimethoprim / sulfamethoxazole IVPB 190 milliGRAM(s) IV Intermittent every 24 hours      ENDOCRINE  CAPILLARY BLOOD GLUCOSE      POCT Blood Glucose.: 228 mg/dL (21 Dec 2024 23:05)  POCT Blood Glucose.: 218 mg/dL (21 Dec 2024 17:55)  POCT Blood Glucose.: 170 mg/dL (21 Dec 2024 12:44)  POCT Blood Glucose.: 269 mg/dL (21 Dec 2024 05:42)    Meds: atorvastatin 80 milliGRAM(s) Oral at bedtime  insulin lispro (ADMELOG) corrective regimen sliding scale   SubCutaneous every 6 hours  insulin NPH human recombinant 15 Unit(s) SubCutaneous every 6 hours  methylPREDNISolone sodium succinate Injectable 32 milliGRAM(s) IV Push <User Schedule>      ACCESS DEVICES:  [ ] Peripheral IV  [ ] Central Venous Line	[ ] R	[ ] L	[ ] IJ	[ ] Fem	[ ] SC	Placed:   [ ] Arterial Line		[ ] R	[ ] L	[ ] Fem	[ ] Rad	[ ] Ax	Placed:   [ ] PICC:					[ ] Mediport  [ ] Urinary Catheter, Date Placed:   [x] Necessity of urinary, arterial, and venous catheters discussed    OTHER MEDICATIONS:  chlorhexidine 0.12% Liquid 15 milliLiter(s) Oral Mucosa every 12 hours  chlorhexidine 2% Cloths 1 Application(s) Topical <User Schedule>  nystatin Powder 1 Application(s) Topical every 12 hours      CODE STATUS:      IMAGING:    ASSESSMENT:    PLAN:

## 2024-12-22 NOTE — PROGRESS NOTE ADULT - CRITICAL CARE ATTENDING COMMENT
Patient evaluated by me on AM rounds.     Current management as follows.   NEURO: Intermittently follows commands.   ---EEG negative. Repeat CTH 12/20 shows posterior lobe age indeterminate infarct. Evaluated by neurology. They request MRI. Discussed with Dr. Vora of transplant. We agree that MRI would not  at this time and the risks of obtaining this study in a critically ill patient outweigh the benefit.   RESP: s/p Trach 12/17. Continues to require ventilator support. Continue PS trials as tolerated.   CVS: Hemodynamically stable. Heparin drip DC'ed due to acute Hb drop. Continue ASA and metoprolol.   ---Trop 274 --> 321. F/up cardiology and repeat ECHO.   GI: Tolerating post pyloric tube feeds.   ---Ostomy output 525cc.   ---Abdominal drains with 500L over 24 hours. Replace output 0.5 : 1 with albumin if more than 1L.  : UOP 215cc/24 hrs. Net positive 819cc/24 hours.   ---S/p HD 12/21 - 1L removed.    HEME: Hb lateral at 10. Plt stable at 63.   ---Continue HSQ and ASA.   ID: Afebrile, WBC 5 --> 8.   ---Urine cx 12/16: E. Faecalis.   ---Combi cath 12/19: Moderate Stenotrophomonas.   ---Appreciate ID recs. Continue Meropenem, Caspo, Vanc, minocycline and Bactrim. F/up results of CT Chest AP.   ENDO: Steroids as per transplant. Remains hyperglycemic to 200s. Titrate up NPH and continue ISS.     Lines:   -Castro 12/16. Patient evaluated by me on AM rounds.     Current management as follows.   NEURO: Intermittently follows commands.   RESP: s/p Trach 12/17. Continues to require ventilator support. Continue PS trials as tolerated.   CVS: Concern for coronary artery vasospasm. Heparin drip DC'ed due to acute Hb drop. Continue ASA and metoprolol.   ---Trop 274 --> 321. F/up cardiology and repeat ECHO.   GI: Tolerating post pyloric tube feeds.   ---Ostomy output 525cc.   ---Abdominal drains with 500cc over 24 hours. Replace output 0.5 : 1 with albumin if more than 1L.  : UOP 215cc/24 hrs. Net positive 819cc/24 hours.   ---S/p HD 12/21 - 1L removed.    HEME: Hb lateral at 10. Plt stable at 63.   ---Continue HSQ and ASA.   ID: Afebrile, WBC 5 --> 8.   ---Urine cx 12/16: E. Faecalis.   ---Combi cath 12/19: Moderate Stenotrophomonas.   ---Appreciate ID recs. Continue Meropenem, Caspo, Vanc, minocycline and Bactrim.  ENDO: Steroids as per transplant. Remains hyperglycemic to 200s. Titrate up NPH and continue ISS.     Lines:   -Castro 12/16. Patient evaluated by me on AM rounds.     Current management as follows.   NEURO: Intermittently follows commands.   RESP: s/p Trach 12/17. Continues to require ventilator support. Continue PS trials as tolerated.   CVS: Concern for ACS 12/20. Heparin drip DC'ed due to acute Hb drop. Continue ASA and metoprolol.   ---Trop 274 --> 321. F/up cardiology and repeat ECHO.   GI: Tolerating post pyloric tube feeds.   ---Ostomy output 525cc.   ---Abdominal drains with 500cc over 24 hours. Replace output 0.5 : 1 with albumin if more than 1L.  : UOP 215cc/24 hrs. Net positive 819cc/24 hours.   ---S/p HD 12/21 - 1L removed.    HEME: Hb lateral at 10. Plt stable at 63.   ---Continue HSQ and ASA.   ID: Afebrile, WBC 5 --> 8.   ---Urine cx 12/16: E. Faecalis.   ---Combi cath 12/19: Moderate Stenotrophomonas.   ---Appreciate ID recs. Continue Meropenem, Caspo, Vanc, minocycline and Bactrim.  ENDO: Steroids as per transplant. Remains hyperglycemic to 200s. Titrate up NPH and continue ISS.     Lines:   -Castro 12/16. Patient evaluated by me on AM rounds.     Current management as follows.   NEURO: Intermittently follows commands.   RESP: s/p Trach 12/17. Continues to require ventilator support. Continue PS trials as tolerated.   CVS: Concern for ACS 12/20. Heparin drip DC'ed due to acute Hb drop but PTT remains within target range of 50 -60. Continue ASA and metoprolol. Trop 274 --> 321. F/up cardiology and repeat ECHO.   GI: Tolerating post pyloric tube feeds.   ---Ostomy output 525cc.   ---Abdominal drains with 500cc over 24 hours. Replace output 0.5 : 1 with albumin if more than 1L.  : UOP 215cc/24 hrs. Net positive 819cc/24 hours.   ---S/p HD 12/21 - 1L removed.    HEME: Hb lateral at 10. Plt stable at 63.   ---Continue HSQ and ASA.   ID: Afebrile, WBC 5 --> 8.   ---Urine cx 12/16: E. Faecalis.   ---Combi cath 12/19: Moderate Stenotrophomonas.   ---Appreciate ID recs. Continue Meropenem, Caspo, Vanc, minocycline and Bactrim.  ENDO: Steroids as per transplant. Remains hyperglycemic to 200s. Titrate up NPH and continue ISS.     Lines:   -Castro 12/16.

## 2024-12-23 NOTE — PROGRESS NOTE ADULT - SUBJECTIVE AND OBJECTIVE BOX
[facility] DIVISION OF KIDNEY DISEASES AND HYPERTENSION -- FOLLOW UP NOTE  --------------------------------------------------------------------------------  Chief Complaint:    24 hour events/subjective:  Patient was seen and examined at bedside      PAST HISTORY  --------------------------------------------------------------------------------  No significant changes to PMH, PSH, FHx, SHx, unless otherwise noted    ALLERGIES & MEDICATIONS  --------------------------------------------------------------------------------  Allergies    No Known Allergies    Intolerances      Standing Inpatient Medications  albuterol/ipratropium for Nebulization 3 milliLiter(s) Nebulizer every 6 hours  aspirin  chewable 81 milliGRAM(s) Oral daily  atorvastatin 80 milliGRAM(s) Oral at bedtime  buDESOnide    Inhalation Suspension 0.5 milliGRAM(s) Inhalation every 12 hours  caspofungin IVPB 50 milliGRAM(s) IV Intermittent every 24 hours  caspofungin IVPB      chlorhexidine 0.12% Liquid 15 milliLiter(s) Oral Mucosa every 12 hours  chlorhexidine 2% Cloths 1 Application(s) Topical <User Schedule>  cycloSPORINE  , modified (GENGRAF) Solution 25 milliGRAM(s) Oral <User Schedule>  fentaNYL   Patch  12 MICROgram(s)/Hr. 1 Patch Transdermal every 48 hours  heparin   Injectable 5000 Unit(s) SubCutaneous every 12 hours  insulin lispro (ADMELOG) corrective regimen sliding scale   SubCutaneous every 6 hours  insulin NPH human recombinant 14 Unit(s) SubCutaneous every 6 hours  meropenem  IVPB 1000 milliGRAM(s) IV Intermittent every 12 hours  methylPREDNISolone sodium succinate Injectable 32 milliGRAM(s) IV Push <User Schedule>  minocycline IVPB      minocycline IVPB 200 milliGRAM(s) IV Intermittent every 12 hours  mycophenolate mofetil Suspension 250 milliGRAM(s) Oral <User Schedule>  norepinephrine Infusion 0.05 MICROgram(s)/kG/Min IV Continuous <Continuous>  nystatin Powder 1 Application(s) Topical every 12 hours  pantoprazole  Injectable 40 milliGRAM(s) IV Push every 12 hours  trimethoprim / sulfamethoxazole IVPB 190 milliGRAM(s) IV Intermittent every 24 hours  vancomycin  IVPB 1000 milliGRAM(s) IV Intermittent once  vasopressin Infusion 0.03 Unit(s)/Min IV Continuous <Continuous>    PRN Inpatient Medications      REVIEW OF SYSTEMS- unable to obtain       VITALS/PHYSICAL EXAM  --------------------------------------------------------------------------------  T(C): 38.3 (12-23-24 @ 11:00), Max: 38.3 (12-23-24 @ 11:00)  HR: 143 (12-23-24 @ 14:00) (98 - 143)  BP: 120/73 (12-23-24 @ 14:00) (59/39 - 163/63)  RR: 36 (12-23-24 @ 14:00) (18 - 57)  SpO2: 96% (12-23-24 @ 14:00) (95% - 100%)  Wt(kg): --        12-22-24 @ 07:01  -  12-23-24 @ 07:00  --------------------------------------------------------  IN: 2771.9 mL / OUT: 1040 mL / NET: 1731.9 mL    12-23-24 @ 07:01  -  12-23-24 @ 15:04  --------------------------------------------------------  IN: 578 mL / OUT: 15 mL / NET: 563 mL      Physical Exam:  	Gen:  critically ill   	HEENT: PERRL, supple neck, clear oropharynx  	Pulm: CTA B/L  	CV: RRR, S1S2; no rub  	Abd: +BS, soft, nontender/nondistended                      Transplant: No tenderness, swelling  	: No suprapubic tenderness  	UE: Warm, FROM; no edema; no asterixis  	LE: Warm, FROM; no edema  	Skin: Warm, without rashes      LABS/STUDIES  --------------------------------------------------------------------------------              9.7    10.37 >-----------<  69       [12-23-24 @ 12:32]              29.1     133  |  97  |  102  ----------------------------<  114      [12-23-24 @ 12:32]  4.7   |  17  |  2.47        Ca     7.5     [12-23-24 @ 12:32]      Mg     2.5     [12-23-24 @ 12:32]      Phos  6.7     [12-23-24 @ 12:32]    TPro  3.8  /  Alb  2.6  /  TBili  9.2  /  DBili  x   /  AST  136  /  ALT  80  /  AlkPhos  161  [12-23-24 @ 12:32]    PT/INR: PT 15.4 , INR 1.34       [12-23-24 @ 13:23]  PTT: 42.4       [12-23-24 @ 13:23]      Creatinine Trend:  SCr 2.47 [12-23 @ 12:32]  SCr 2.31 [12-23 @ 00:26]  SCr 2.02 [12-22 @ 12:36]  SCr 1.77 [12-22 @ 00:16]  SCr 1.37 [12-21 @ 13:39]              Urinalysis - [12-23-24 @ 12:32]      Color  / Appearance  / SG  / pH       Gluc 114 / Ketone   / Bili  / Urobili        Blood  / Protein  / Leuk Est  / Nitrite       RBC  / WBC  / Hyaline  / Gran  / Sq Epi  / Non Sq Epi  / Bacteria       Vitamin D (25OH) <6.0      [11-21-24 @ 08:32]  TSH 0.68      [12-12-24 @ 12:27]  Lipid: chol 114, , HDL 47, LDL --      [04-24-24 @ 06:48]

## 2024-12-23 NOTE — PROGRESS NOTE ADULT - SUBJECTIVE AND OBJECTIVE BOX
Chief complaint:   Chief Complaint   Patient presents with   • Follow-up   • Diabetes       Vitals:  Visit Vitals  /76 (Cuff Size: Large Adult)   Pulse 82   Ht 5' 11\" (1.803 m)   Wt 106.1 kg (234 lb)   BMI 32.64 kg/m²       HISTORY OF PRESENT ILLNESS     REBEL Castillo is a 50 year old male who presents today for follow up of his diabetes mellitus type 2, hypertension, and hyperlipidemia.  His diabetes is under fair control.  He checks blood sugars at home 2 times per day and the average is fair. He has the lashon. Hypoglycemic events are rare.  His last dilated eye exam was within the last year.  There was moderate retinopathy present (macular edema)  He does not have neuropathy in his feet.  His last urine testing revealed no microalbuminuria.  His blood pressure is under good control.  His last lipid testing revealed fair control.  He notes no side effects from his medications.     He is seeing DM ed. He is on insulin 20 U at night. His BS is 160-190 in am and 100 at 2 pm and 220 at 6 pm. He eats at 4 pm. He is up at 4 am every day.    Diet:  fair still fair amount of potatoes and pasta  Exercise:  active but does not specifically exercise walks a lot at work     His most recent labs are as follows:     Lab Results   Component Value Date    HGBA1C 8.2 (H) 11/12/2022    HGBA1C 8.0 (H) 07/25/2022     Lab Results   Component Value Date    CHOLESTEROL 123 11/12/2022    HDL 58 11/12/2022    CALCLDL 48 11/12/2022    TRIGLYCERIDE 85 11/12/2022     Lab Results   Component Value Date    MALBCR 7.6 01/20/2022    CREATININE 0.72 11/12/2022       Immunization History   Administered Date(s) Administered   • COVID-19 12Y+ Pfizer-[employer] - [employer] - Requires Dilution 04/19/2021, 05/12/2021, 01/06/2022   • DTaP(INFANRIX) 1972, 03/14/1973, 04/11/1973   • Influenza Quadrivalent, MDCK, multi-dose (FLUCELVAX) 11/06/2019   • Influenza, Unspecified Formulation 10/15/2017   • Influenza, quadrivalent, MDCK, preserve-free (FLUCELVAX)  10/14/2017   • Influenza, quadrivalent, multi-dose 12/05/2018, 10/31/2022   • Influenza, quadrivalent, preserve-free 10/08/2020, 10/01/2021   • Influenza, seasonal, injectable, trivalent 10/25/2012, 12/30/2013, 09/30/2014, 10/12/2015, 10/01/2016, 09/29/2021   • MMR 09/05/1973, 05/04/1977, 03/05/2013, 04/25/2013   • PPD 03/05/2013, 04/25/2013, 04/03/2014   • Pneumococcal Polysaccharide Vacc (Pneumovax 23) 12/05/2018   • Polio, Unspecified Formulation 1972, 03/14/1973, 05/04/1977   • Tdap 10/25/2012   • Varicella 03/05/2013, 04/25/2013           Other significant problems:  Patient Active Problem List    Diagnosis Date Noted   • Fatty liver 05/12/2017     Priority: Low   • DM (diabetes mellitus), type 2 (CMS/HCC) 07/10/2014     Priority: Low   • High cholesterol 07/10/2014     Priority: Low   • HTN (hypertension) 07/10/2014     Priority: Low   • Depression 07/10/2014     Priority: Low   • BENNIE (obstructive sleep apnea) 11/29/2012     Priority: Low   • Epigastric abdominal pain      Priority: Low       PAST MEDICAL, FAMILY AND SOCIAL HISTORY     Medications:  Current Outpatient Prescriptions   Medication   Reviewed    Allergies:  ALLERGIES:  No Known Allergies    Past Medical  History/Surgeries:  Past Medical History:   Diagnosis Date   • Anxiety    • Chronic insomnia    • Depression    • Diabetes mellitus (CMS/HCC)    • Enchondroma, multiple     left index finger   • Epigastric abdominal pain    • Esophageal reflux    • Hypertension    • Obstructive sleep apnea (adult) (pediatric) 11/29/2012   • Other and unspecified hyperlipidemia        Past Surgical History:   Procedure Laterality Date   • Amputation finger/thumb  02/22/2012    Left index finger   • Colonoscopy w biopsy  01/16/2014    Repeat in ten years.   • Esophagogastroduodenoscopy transoral flex w/bx single or mult  08/30/2012       Family History:  Family History   Problem Relation Age of Onset   • Diabetes Mother    • Arthritis Mother    • Cancer  24 HOUR EVENTS:  - fent patch  - on/off levo  - trops remain elevated  - RUE duplex ordered  - vanc x1    NEURO  RASS (if intubated): 		CAM ICU (if concern for delirium):  Exam: AOx0  Meds: fentaNYL   Patch  12 MICROgram(s)/Hr. 1 Patch Transdermal every 48 hours      RESPIRATORY  RR: 34 (12-23-24 @ 00:00) (23 - 64)  SpO2: 97% (12-23-24 @ 00:00) (96% - 100%)  Wt(kg): --  Exam: Lungs CTA b/l  Mechanical Ventilation: Mode: AC/ CMV (Assist Control/ Continuous Mandatory Ventilation), RR (machine): 14, RR (patient): 24, TV (machine): 500, FiO2: 30, PEEP: 5, ITime: 0.9, MAP: 13, PIP: 25    Meds: albuterol/ipratropium for Nebulization 3 milliLiter(s) Nebulizer every 6 hours  buDESOnide    Inhalation Suspension 0.5 milliGRAM(s) Inhalation every 12 hours      CARDIOVASCULAR  HR: 107 (12-23-24 @ 00:00) (98 - 113)  BP: 98/55 (12-23-24 @ 00:00) (72/42 - 115/56)  BP(mean): 73 (12-23-24 @ 00:00) (52 - 82)  ABP: --  ABP(mean): --  Wt(kg): --  CVP(cm H2O): --  VBG - ( 23 Dec 2024 00:15 )  pH: 7.37  /  pCO2: 33    /  pO2: 62    / HCO3: 19    / Base Excess: -5.5  /  SaO2: 91.8   Lactate: 2.2                Exam: Normal S1/S2 w/o murmurs or rubs  Cardiac Rhythm: sinus  Perfusion     [x ]Adequate   [ ]Inadequate  Mentation   [x ]Normal       [ ]Reduced  Extremities  [x ]Warm         [ ]Cool  Volume Status [ ]Hypervolemic [x ]Euvolemic [ ]Hypovolemic  Meds: metoprolol tartrate 12.5 milliGRAM(s) Enteral Tube every 8 hours  norepinephrine Infusion 0.05 MICROgram(s)/kG/Min IV Continuous <Continuous>      GI/NUTRITION  Exam: abd non distended  Diet: tube feeds  Last Bowel Movement: 21-Dec-2024 (12-21-24 @ 07:00)  Last Bowel Movement: 21-Dec-2024 (12-20-24 @ 19:00)  Last Bowel Movement: 17-Dec-2024 (12-17-24 @ 19:00)  Last Bowel Movement: 16-Dec-2024 (12-16-24 @ 19:00)  Last Bowel Movement: 16-Dec-2024 (12-16-24 @ 07:00)    Meds: pantoprazole  Injectable 40 milliGRAM(s) IV Push every 12 hours      GENITOURINARY  I&O's Detail    12-21 @ 07:01  -  12-22 @ 07:00  --------------------------------------------------------  IN:    Enteral Tube Flush: 120 mL    Heparin: 23.5 mL    IV PiggyBack: 1200 mL    Nepro with Carb Steady: 1265 mL    PRBCs (Packed Red Blood Cells): 600 mL  Total IN: 3208.5 mL    OUT:    Bulb (mL): 350 mL    Bulb (mL): 150 mL    Ileostomy (mL): 525 mL    Indwelling Catheter - Urethral (mL): 215 mL    Other (mL): 1000 mL    VAC (Vacuum Assisted Closure) System (mL): 150 mL  Total OUT: 2390 mL    Total NET: 818.5 mL      12-22 @ 07:01  -  12-23 @ 00:32  --------------------------------------------------------  IN:    Albumin 5%  - 250 mL: 500 mL    Enteral Tube Flush: 20 mL    IV PiggyBack: 500 mL    Nepro with Carb Steady: 935 mL    Norepinephrine: 28 mL    Norepinephrine: 3.5 mL  Total IN: 1986.5 mL    OUT:    Ileostomy (mL): 450 mL    Indwelling Catheter - Urethral (mL): 120 mL    VAC (Vacuum Assisted Closure) System (mL): 0 mL  Total OUT: 570 mL    Total NET: 1416.5 mL          12-22    134[L]  |  98  |  91[H]  ----------------------------<  205[H]  4.0   |  19[L]  |  2.02[H]    Ca    8.1[L]      22 Dec 2024 12:36  Phos  4.8     12-22  Mg     2.2     12-22    TPro  4.1[L]  /  Alb  3.0[L]  /  TBili  8.9[H]  /  DBili  x   /  AST  75[H]  /  ALT  66[H]  /  AlkPhos  131[H]  12-22    Meds:     HEMATOLOGIC  Meds: aspirin  chewable 81 milliGRAM(s) Oral daily  heparin   Injectable 5000 Unit(s) SubCutaneous every 12 hours                          10.0   8.72  )-----------( 61       ( 22 Dec 2024 19:46 )             29.1     PT/INR - ( 22 Dec 2024 06:17 )   PT: 15.3 sec;   INR: 1.33 ratio         PTT - ( 22 Dec 2024 18:14 )  PTT:50.5 sec    INFECTIOUS DISEASES  T(C): 37.4 (12-22-24 @ 23:00), Max: 37.7 (12-22-24 @ 03:00)  Wt(kg): --  WBC Count: 8.72 K/uL (12-22 @ 19:46)  WBC Count: 10.01 K/uL (12-22 @ 12:36)  WBC Count: 8.81 K/uL (12-22 @ 06:17)    Recent Cultures:  Specimen Source: Abdominal Fl, 12-20 @ 18:23; Results   Rare Enterococcus faecalis[!]; Gram Stain:   polymorphonuclear leukocytes seen  Gram positive cocci in pairs seen  by cytocentrifuge[!]; Organism: Enterococcus faecalis[!]  Specimen Source: Trach Asp Tracheal Aspirate, 12-19 @ 14:31; Results   Moderate Stenotrophomonas maltophilia  Commensal charity consistent with body site[!]; Gram Stain:   Moderate polymorphonuclear leukocytes per low power field  Rare Squamous epithelial cells per low power field  Numerous Gram Negative Rods per oil power field  Numerous Yeast like cells per oil power field[!]; Organism: Stenotrophomonas maltophilia[!]  Specimen Source: .Blood BLOOD, 12-19 @ 03:28; Results   No growth at 72 Hours; Gram Stain: --; Organism: --  Specimen Source: Catheterized Catheterized, 12-16 @ 13:10; Results   10,000 - 49,000 CFU/mL Enterococcus faecalis[!]; Gram Stain: --; Organism: Enterococcus faecalis[!]  Specimen Source: .Blood BLOOD, 12-16 @ 13:00; Results   No growth at 5 days; Gram Stain: --; Organism: --  Specimen Source: Combi-Cath, 12-16 @ 08:36; Results   Commensal charity consistent with body site; Gram Stain:   No polymorphonuclear leukocytes seen per low power field  No Squamous epithelial cells seen per low power field  No organisms seen per oil power field; Organism: --  Specimen Source: .Blood BLOOD, 12-16 @ 07:45; Results   No growth at 5 days; Gram Stain: --; Organism: --    Meds: caspofungin IVPB      caspofungin IVPB 50 milliGRAM(s) IV Intermittent every 24 hours  cycloSPORINE  , modified (GENGRAF) Solution 25 milliGRAM(s) Oral <User Schedule>  meropenem  IVPB 1000 milliGRAM(s) IV Intermittent every 12 hours  minocycline IVPB      minocycline IVPB 200 milliGRAM(s) IV Intermittent every 12 hours  mycophenolate mofetil Suspension 250 milliGRAM(s) Oral <User Schedule>  trimethoprim / sulfamethoxazole IVPB 190 milliGRAM(s) IV Intermittent every 24 hours      ENDOCRINE  Capillary Blood Glucose    Meds: atorvastatin 80 milliGRAM(s) Oral at bedtime  insulin lispro (ADMELOG) corrective regimen sliding scale   SubCutaneous every 6 hours  insulin NPH human recombinant 18 Unit(s) SubCutaneous every 6 hours  methylPREDNISolone sodium succinate Injectable 32 milliGRAM(s) IV Push <User Schedule>      ACCESS DEVICES:  [ ] Peripheral IV  [ ] Central Venous Line		[ ] R	[ ] L	[ ] IJ	[ ] Fem	[ ] SC	Placed:   [ ] Arterial Line			[ ] R	[ ] L	[ ] Fem	[ ] Rad	[ ] Ax	Placed:   [ ] PICC:					[ ] Mediport  [ ] Urinary Catheter, Date Placed:   [ ] Necessity of urinary, arterial, and venous catheters discussed    OTHER MEDICATIONS:  chlorhexidine 0.12% Liquid 15 milliLiter(s) Oral Mucosa every 12 hours  chlorhexidine 2% Cloths 1 Application(s) Topical <User Schedule>  nystatin Powder 1 Application(s) Topical every 12 hours      IMAGING: Maternal Uncle    • Diabetes Maternal Uncle    • High blood pressure Maternal Uncle    • Arthritis Maternal Grandmother        Social History:  Social History     Tobacco Use   • Smoking status: Never Smoker   • Smokeless tobacco: Never Used   Substance Use Topics   • Alcohol use: Yes     Alcohol/week: 14.0 standard drinks     Types: 14 Standard drinks or equivalent per week       REVIEW OF SYSTEMS     Review of Systems   All other systems reviewed and are negative.      PHYSICAL EXAM     Physical Exam  Vitals reviewed.   Constitutional:       General: He is not in acute distress.     Appearance: He is well-developed.   HENT:      Neck: Neck supple.   Eyes:      General: No scleral icterus.        Right eye: No discharge.         Left eye: No discharge.      Conjunctiva/sclera: Conjunctivae normal.      Pupils: Pupils are equal, round, and reactive to light.   Neck:      Thyroid: No thyromegaly.      Vascular: No carotid bruit or JVD.      Trachea: No tracheal deviation.   Cardiovascular:      Rate and Rhythm: Normal rate and regular rhythm.      Heart sounds: No murmur heard.    No friction rub. No gallop.   Pulmonary:      Effort: Pulmonary effort is normal.      Breath sounds: No wheezing or rales.   Abdominal:      General: Bowel sounds are normal.      Palpations: Abdomen is soft.      Tenderness: There is no abdominal tenderness.   Lymphadenopathy:      Cervical: No cervical adenopathy.   Skin:     General: Skin is warm and dry.      Findings: No rash.      Comments: Feet are free of calluses, ulcerations, or other lesions of concern.   Neurological:      Mental Status: He is alert. He is not disoriented.      Sensory: No sensory deficit (no sensory deficit in the feet with monofilament testing).         ASSESSMENT/PLAN     ASSESSMENT:  1. DM (diabetes mellitus), type 2 with DM retinopathy - His HBA1c is up a little. Discussed changing metformin ER to 2 in am and 2 at dinner and titration of insulin through DM  ed after a few weeks if BS remain high   2. High cholesterol - on statin   3. HTN (hypertension) - controlled   4. Depression- continue effexor, symptoms controlled      PLAN:  No orders of the defined types were placed in this encounter.      No follow-ups on file.

## 2024-12-23 NOTE — ADVANCED PRACTICE NURSE CONSULT - RECOMMEDATIONS
Will recommend:  1. Monitor output.  2. Empty pouch when 1/3-1/2 full   3. Change pouching system every 3-4 days & prn leakage  4. Contact ostomy specialists if questions, concerns/issues .  5. Supplies: [place] 2 1/4" Ceraplus flat skin barrier (#69635), Lianne 2 1/4" drainable pouch (#[id number]); Accessory products:  stoma paste #[id number], stoma powder (#8769) & Cavilon No sting barrier film wipe (#8326)    Will f/u for ostomy education when appropriate. Pt remains acute requiring SICU care.

## 2024-12-23 NOTE — ADVANCED PRACTICE NURSE CONSULT - ASSESSMENT
Chart reviewed and events noted to date.  In at bedside and PT wound care Sharon Morgan for routine vac dressing  and complete ostomy pouching system changes. (see PT wound note for details). Patient noted with [place] 2 piece pouching system.  Seal intact.  Old pouching system removed and peristomal skin assessed.  Skin intact.    Complete pouching system changed.  On exam:  Stoma 1 3/4" red and viable.  Yellow pasty stool noted in the old pouch prior to removal.   Some bleeding noted at mucocutaneous junction controlled by gentle pressure. Peristomal skin and mucocutaneous junction intact.   Noted creases/skin indents at 3 and 9 o'clock.   Dusted the peristomal skin with stoma powder excess removed.  Dab in with Cavilon 3M no sting barrier liquid film.  Re pouched w/ 2 1/4" flat skin barrier.  Bead of stoma paste applied to skin creases to level the surface and at the back of skin barrier near opening to caulk.   Re attached the skin barrier flange with the Lianne drainable pouch.  Patient  tolerated procedure well.   Supplies and pattern  left at the bedside.  Clinical staff RN at the bedside during the change and aware of the plan of care.  Will continue to follow up.

## 2024-12-23 NOTE — PROGRESS NOTE ADULT - ASSESSMENT
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   s/p OLT 11/15 with complicated post op course    [] Septic shock/Cardiogenic shock  [] s/p Colectomy 12/7 POD# 16  [] RTOR for closure and Ileostomy creation   [] IAPB 12/7- removed 12/10: CTICU on board  - E coli Bacteremia (12/6) - on jeniffer/caspo -> switched to  cefepime/fluc. Completed 7days of empiric flagyl.  Received Tobra x 1 12/6 .   - Tx ID following - c/w Jeniffer/Caspo/Minocycline  - Neutropenia: received Neupogen 480mcg x2  - MORAIMA: CRRT started 12/7, stopped 12/15, HD 12/20  - AMS; CT Head neg  - scrotal edema: repeat scrotal us; neg for abscess  - Hold diuretics   - BCx (12/16): no growth to date and UA (12/16) +nitrite and leukocyte esterase   - On meropenem and caspofungin as per ID  - Vitamin K x 3 days 12/16-12/19  - S/P tracheotomy 12/17    [] s/p OLT POD #38  - trend LFT's  - Diet: increase TFs as needed  - Pain management: avoid narcotics  - Strict I&Os, nichols, wound vac placed 12/10   - US: L brachial DVT   - wound vac exchange for ileostomy (12/13)  - HIT panel neg (12/14)  - Recultured (tracheal aspirate showing GNR and yeast like cells)    [ ] Immunosuppression  - Tacrolimus stopped 11/18 in setting of AMS/Seizure, Started Cyclo 12/17  - Cyclo by level, MMF 250QD, Solumedrol 32 mg daily  - PPx: Gancyclovir (HELD) in setting of thrombocytopenia    [] lleus   -@ home on Linzess  - imaging with distended colon (likely opioid induced)  - s/p Neostigmine x 2  - s/p Relistor, last dose 12/1  - s/p colectomy with end ileostomy  (see above)  - Daily AXR     [] CMV viremia  - d/c gancylovir due to thrombocytopenia  - CMV PCR (12/11 and 12/16): neg    [ ] AMS  - Reintubated 11/20 Aspiration/hypoxia, extubated 11/24->[id number]/24--> extubated 11/26 -> [id number]/7 -> tracheostomy 12/17  - EEG 11/30 negative, Neurology following  - BH following   - off tacro  - on keppra  -CT Head No Cont (12/20): Age-indeterminate posterior left frontal lobe infarct. No need for MRI.    [ ] HTN/ pAFib  [ ] coronary vasospasm vs posterior wall MI  - restarted on hep gtt, goal PTT 50-60  - ASA  - Cards- plan for PCI prior to DC   - Off Amiodarone, off dobutamine  - c/w metoprolol  - Eliquis 5mg bid - held 12/6.   - Dr. 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   s/p OLT 11/15 with complicated post op course    [] Septic shock/Cardiogenic shock  [] s/p Colectomy 12/7   [] RTOR for closure and Ileostomy creation   [] IAPB 12/7- removed 12/10:   - E coli Bacteremia (12/6) - on jeniffer/caspo,  Received Tobra x 1 12/6 .   - EC faecalis asc fluid ( 12/20)  - Ec faecalis UTI (12/16)  - Tx ID following - c/w Jeniffer/Caspo/Minocycline, vanco by level, bactrim IV   - Neutropenia: received Neupogen 480mcg x2  - MORAIMA: CRRT started 12/7, stopped 12/15, now iHD   - AMS; CT Head neg  - Hold diuretics   - Repeat blood cult today  - CT chest/Abd/pelvis today  - S/P tracheotomy 12/17    [] s/p OLT POD #38  - trend LFT's  - Diet: increase TFs as needed  - Pain management: avoid narcotics  - Strict I&Os, nichols, wound vac placed 12/10   - US: L brachial DVT   - wound vac exchange for ileostomy (12/13)  - HIT panel neg (12/14)    [ ] Immunosuppression  - Tacrolimus stopped 11/18 in setting of AMS/Seizure, Started Cyclo 12/17  - Cyclo by level, MMF 250QD, Solumedrol 32 mg daily  - PPx: Gancyclovir (HELD) in setting of thrombocytopenia; repeat CMV PCR pending     [] lleus   -@ home on Linzess  - imaging with distended colon (likely opioid induced)  - s/p Neostigmine x 2  - s/p Relistor, last dose 12/1  - s/p colectomy with end ileostomy  (see above)  - Daily AXR     [] CMV viremia  - d/c gancylovir due to thrombocytopenia  - CMV PCR (12/11 and 12/16): neg, repeat CMV pending from 12/24    [ ] AMS  - Reintubated 11/20 Aspiration/hypoxia, extubated 11/24->fvfctuoqrzf33/24--> extubated 11/26 -> [id number]/7 -> tracheostomy 12/17  - EEG 11/30 negative, Neurology following  -  following   - off tacro  - on keppra  -CT Head No Cont (12/20): Age-indeterminate posterior left frontal lobe infarct.     [ ] HTN/ pAFib  [ ] coronary vasospasm vs posterior wall MI  - Heparin gtt 12/19-21  - ASA  - Cards- plan for PCI prior to DC   - Off Amiodarone, off dobutamine  - c/w metoprolol  - Eliquis 5mg bid - held 12/6.   - Dr. Quintanilla following    [ ] DM  - ISS     []Scrotal abscess   - US (12/12): 2.1 x0.9 x 3.2 cm focal, right testicle- possible abscess (12/12)  - Urology c/s recs nothing to do, repeat ultrasound   - 12/15 CT CAP no acute changes

## 2024-12-23 NOTE — PROGRESS NOTE ADULT - SUBJECTIVE AND OBJECTIVE BOX
Follow Up:      Interval History:    REVIEW OF SYSTEMS  [  ] ROS unobtainable because:    [  ] All other systems negative except as noted below    Constitutional:  [ ] fever [ ] chills  [ ] weight loss  [ ] weakness  Skin:  [ ] rash [ ] phlebitis	  Eyes: [ ] icterus [ ] pain  [ ] discharge	  ENMT: [ ] sore throat  [ ] thrush [ ] ulcers [ ] exudates  Respiratory: [ ] dyspnea [ ] hemoptysis [ ] cough [ ] sputum	  Cardiovascular:  [ ] chest pain [ ] palpitations [ ] edema	  Gastrointestinal:  [ ] nausea [ ] vomiting [ ] diarrhea [ ] constipation [ ] pain	  Genitourinary:  [ ] dysuria [ ] frequency [ ] hematuria [ ] discharge [ ] flank pain  [ ] incontinence  Musculoskeletal:  [ ] myalgias [ ] arthralgias [ ] arthritis  [ ] back pain  Neurological:  [ ] headache [ ] seizures  [ ] confusion/altered mental status    Allergies  No Known Allergies        ANTIMICROBIALS:  caspofungin IVPB 50 every 24 hours  caspofungin IVPB    meropenem  IVPB 1000 every 12 hours  minocycline IVPB    minocycline IVPB 200 every 12 hours  trimethoprim / sulfamethoxazole IVPB 190 every 24 hours  vancomycin  IVPB 1000 once      OTHER MEDS:  MEDICATIONS  (STANDING):  albuterol/ipratropium for Nebulization 3 every 6 hours  aspirin  chewable 81 daily  atorvastatin 80 at bedtime  buDESOnide    Inhalation Suspension 0.5 every 12 hours  cycloSPORINE  , modified (GENGRAF) Solution 25 <User Schedule>  fentaNYL   Patch  12 MICROgram(s)/Hr. 1 every 48 hours  heparin   Injectable 5000 every 12 hours  insulin lispro (ADMELOG) corrective regimen sliding scale  every 6 hours  insulin NPH human recombinant 14 every 6 hours  methylPREDNISolone sodium succinate Injectable 32 <User Schedule>  norepinephrine Infusion 0.05 <Continuous>  pantoprazole  Injectable 40 every 12 hours  vasopressin Infusion 0.03 <Continuous>      Vital Signs Last 24 Hrs  T(C): 38.6 (23 Dec 2024 15:00), Max: 38.7 (23 Dec 2024 13:00)  T(F): 101.5 (23 Dec 2024 15:00), Max: 101.7 (23 Dec 2024 13:00)  HR: 110 (23 Dec 2024 15:50) (101 - 143)  BP: 113/69 (23 Dec 2024 15:30) (59/39 - 163/63)  BP(mean): 86 (23 Dec 2024 15:30) (44 - 96)  RR: 34 (23 Dec 2024 15:30) (18 - 57)  SpO2: 100% (23 Dec 2024 15:50) (95% - 100%)    Parameters below as of 23 Dec 2024 12:07  Patient On (Oxygen Delivery Method): ventilator        PHYSICAL EXAMINATION:  General: Alert and Awake, NAD  HEENT: PERRL, EOMI  Neck: Supple  Cardiac: RRR, No M/R/G  Resp: CTAB, No Wh/Rh/Ra  Abdomen: NBS, NT/ND, No HSM, No rigidity or guarding  MSK: No LE edema. No Calf tenderness  : No nichols  Skin: No rashes or lesions. Skin is warm and dry to the touch.   Neuro: Alert and Awake. CN 2-12 Grossly intact. Moves all four extremities spontaneously.  Psych: Calm, Pleasant, Cooperative                          9.7    10.37 )-----------( 69       ( 23 Dec 2024 12:32 )             29.1       12-23    133[L]  |  97  |  102[H]  ----------------------------<  114[H]  4.7   |  17[L]  |  2.47[H]    Ca    7.5[L]      23 Dec 2024 12:32  Phos  6.7     12-23  Mg     2.5     12-23    TPro  3.8[L]  /  Alb  2.6[L]  /  TBili  9.2[H]  /  DBili  x   /  AST  136[H]  /  ALT  80[H]  /  AlkPhos  161[H]  12-23      Urinalysis Basic - ( 23 Dec 2024 12:32 )    Color: x / Appearance: x / SG: x / pH: x  Gluc: 114 mg/dL / Ketone: x  / Bili: x / Urobili: x   Blood: x / Protein: x / Nitrite: x   Leuk Esterase: x / RBC: x / WBC x   Sq Epi: x / Non Sq Epi: x / Bacteria: x        MICROBIOLOGY:  Vancomycin Level, Random: 16.3 ug/mL (12-23-24 @ 06:15)  v  Abdominal Fl  12-20-24   Rare Enterococcus faecalis  --  Enterococcus faecalis      Trach Asp Tracheal Aspirate  12-19-24   Moderate Stenotrophomonas maltophilia  Commensal charity consistent with body site  --  Stenotrophomonas maltophilia      .Blood BLOOD  12-19-24   No growth at 4 days  --  --      Catheterized Catheterized  12-16-24   10,000 - 49,000 CFU/mL Enterococcus faecalis  --  Enterococcus faecalis      .Blood BLOOD  12-16-24   No growth at 5 days  --  --      Combi-Cath  12-16-24   Commensal charity consistent with body site  --    No polymorphonuclear leukocytes seen per low power field  No Squamous epithelial cells seen per low power field  No organisms seen per oil power field      .Blood BLOOD  12-16-24   No growth at 5 days  --  --      Combi-Cath  12-14-24   Commensal charity consistent with body site  --    Numerous polymorphonuclear leukocytes per low power field  No Squamous epithelial cells per low power field  No organisms seen per oil power field      .Other  12-12-24   No fungus isolated at 1 week.  --  --      .Blood BLOOD  12-07-24   No growth at 5 days  --  --      Body Fluid  12-07-24   No growth at 5 days  --    polymorphonuclear leukocytes seen  No organisms seen  by cytocentrifuge      .Blood BLOOD  12-06-24   Growth in aerobic and anaerobic bottles: Escherichia coli  See previous culture [id number]  --    Growth in aerobic bottle: Gram Negative Rods  Growth in anaerobic bottle: Gram Negative Rods      .Blood BLOOD  12-06-24   Growth in aerobic and anaerobic bottles: Escherichia coli  Direct identification is available within approximately 3-5  hours either by Blood Panel Multiplexed PCR or Direct  MALDI-TOF. Details: [email or web address]  --  Blood Culture PCR  Escherichia coli      .Blood BLOOD  12-05-24   No growth at 5 days  --  --      .Blood BLOOD  12-05-24   No growth at 5 days  --  --      .Stool  12-01-24   No enteric pathogens isolated.  (Stool culture examined for Salmonella,  Shigella, Campylobacter, Aeromonas, Plesiomonas,  Vibrio, E.coli O157 and Yersinia)  --  --      .Blood BLOOD  11-26-24   No growth at 5 days  --  --      .Blood BLOOD  11-26-24   No growth at 5 days  --  --      Bronchial  11-24-24   Few Candida glabrata  --  Candida (Torulopsis) glabrata      .Blood BLOOD  11-24-24   No growth at 5 days  --  --      .Blood BLOOD  11-24-24   No growth at 5 days  --  --        HIV-1 RNA Quantitative, Viral Load Log: NOT DET. lg /mL (12-18-24 @ 22:14)  Toxoplasma IgG Screen: 78.00 IU/mL (11-15-24 @ 00:41)  CMV IgG Antibody: 1.50 U/mL (11-15-24 @ 00:41)          RADIOLOGY:    <The imaging below has been reviewed and visualized by me independently. Findings as detailed in report below> Follow Up:  Fever    Interval History: high grade fevers today. remains on double pressor.     REVIEW OF SYSTEMS  [ x ] ROS unobtainable because:  intubated  [  ] All other systems negative except as noted below    Constitutional:  [ ] fever [ ] chills  [ ] weight loss  [ ] weakness  Skin:  [ ] rash [ ] phlebitis	  Eyes: [ ] icterus [ ] pain  [ ] discharge	  ENMT: [ ] sore throat  [ ] thrush [ ] ulcers [ ] exudates  Respiratory: [ ] dyspnea [ ] hemoptysis [ ] cough [ ] sputum	  Cardiovascular:  [ ] chest pain [ ] palpitations [ ] edema	  Gastrointestinal:  [ ] nausea [ ] vomiting [ ] diarrhea [ ] constipation [ ] pain	  Genitourinary:  [ ] dysuria [ ] frequency [ ] hematuria [ ] discharge [ ] flank pain  [ ] incontinence  Musculoskeletal:  [ ] myalgias [ ] arthralgias [ ] arthritis  [ ] back pain  Neurological:  [ ] headache [ ] seizures  [ ] confusion/altered mental status    Allergies  No Known Allergies        ANTIMICROBIALS:  caspofungin IVPB 50 every 24 hours  caspofungin IVPB    meropenem  IVPB 1000 every 12 hours  minocycline IVPB    minocycline IVPB 200 every 12 hours  trimethoprim / sulfamethoxazole IVPB 190 every 24 hours  vancomycin  IVPB 1000 once      OTHER MEDS:  MEDICATIONS  (STANDING):  albuterol/ipratropium for Nebulization 3 every 6 hours  aspirin  chewable 81 daily  atorvastatin 80 at bedtime  buDESOnide    Inhalation Suspension 0.5 every 12 hours  cycloSPORINE  , modified (GENGRAF) Solution 25 <User Schedule>  fentaNYL   Patch  12 MICROgram(s)/Hr. 1 every 48 hours  heparin   Injectable 5000 every 12 hours  insulin lispro (ADMELOG) corrective regimen sliding scale  every 6 hours  insulin NPH human recombinant 14 every 6 hours  methylPREDNISolone sodium succinate Injectable 32 <User Schedule>  norepinephrine Infusion 0.05 <Continuous>  pantoprazole  Injectable 40 every 12 hours  vasopressin Infusion 0.03 <Continuous>      Vital Signs Last 24 Hrs  T(C): 38.6 (23 Dec 2024 15:00), Max: 38.7 (23 Dec 2024 13:00)  T(F): 101.5 (23 Dec 2024 15:00), Max: 101.7 (23 Dec 2024 13:00)  HR: 110 (23 Dec 2024 15:50) (101 - 143)  BP: 113/69 (23 Dec 2024 15:30) (59/39 - 163/63)  BP(mean): 86 (23 Dec 2024 15:30) (44 - 96)  RR: 34 (23 Dec 2024 15:30) (18 - 57)  SpO2: 100% (23 Dec 2024 15:50) (95% - 100%)    Parameters below as of 23 Dec 2024 12:07  Patient On (Oxygen Delivery Method): ventilator      PHYSICAL EXAMINATION:  General: Intubated and Sedated  HEENT: +ETT  Neck: Supple  Cardiac: RRR, No M/R/G  Resp: CTAB, No Wh/Rh/Ra  Abdomen: NBS, NT/ND, No HSM, No rigidity or guarding  MSK: No LE edema. No Calf tenderness  : +testicular edema with improved erythema compared to week prior  Skin: No rashes or lesions. Skin is warm and dry to the touch.   Neuro: Intubated and Sedated  Psych: Unable to assess - intubated and sedated                        9.7    10.37 )-----------( 69       ( 23 Dec 2024 12:32 )             29.1       12-23    133[L]  |  97  |  102[H]  ----------------------------<  114[H]  4.7   |  17[L]  |  2.47[H]    Ca    7.5[L]      23 Dec 2024 12:32  Phos  6.7     12-23  Mg     2.5     12-23    TPro  3.8[L]  /  Alb  2.6[L]  /  TBili  9.2[H]  /  DBili  x   /  AST  136[H]  /  ALT  80[H]  /  AlkPhos  161[H]  12-23      Urinalysis Basic - ( 23 Dec 2024 12:32 )    Color: x / Appearance: x / SG: x / pH: x  Gluc: 114 mg/dL / Ketone: x  / Bili: x / Urobili: x   Blood: x / Protein: x / Nitrite: x   Leuk Esterase: x / RBC: x / WBC x   Sq Epi: x / Non Sq Epi: x / Bacteria: x    MICROBIOLOGY:    Vancomycin Level, Random: 16.3 ug/mL (12-23-24 @ 06:15)    Abdominal Fl  12-20-24   Rare Enterococcus faecalis  --  Enterococcus faecalis      Trach Asp Tracheal Aspirate  12-19-24   Moderate Stenotrophomonas maltophilia  Commensal charity consistent with body site  --  Stenotrophomonas maltophilia      .Blood BLOOD  12-19-24   No growth at 4 days  --  --      Catheterized Catheterized  12-16-24   10,000 - 49,000 CFU/mL Enterococcus faecalis  --  Enterococcus faecalis      .Blood BLOOD  12-16-24   No growth at 5 days  --  --      Combi-Cath  12-16-24   Commensal charity consistent with body site  --    No polymorphonuclear leukocytes seen per low power field  No Squamous epithelial cells seen per low power field  No organisms seen per oil power field      .Blood BLOOD  12-16-24   No growth at 5 days  --  --      Combi-Cath  12-14-24   Commensal charity consistent with body site  --    Numerous polymorphonuclear leukocytes per low power field  No Squamous epithelial cells per low power field  No organisms seen per oil power field      .Other  12-12-24   No fungus isolated at 1 week.  --  --      .Blood BLOOD  12-07-24   No growth at 5 days  --  --      Body Fluid  12-07-24   No growth at 5 days  --    polymorphonuclear leukocytes seen  No organisms seen  by cytocentrifuge      .Blood BLOOD  12-06-24   Growth in aerobic and anaerobic bottles: Escherichia coli  See previous culture [id number]  --    Growth in aerobic bottle: Gram Negative Rods  Growth in anaerobic bottle: Gram Negative Rods      .Blood BLOOD  12-06-24   Growth in aerobic and anaerobic bottles: Escherichia coli  Direct identification is available within approximately 3-5  hours either by Blood Panel Multiplexed PCR or Direct  MALDI-TOF. Details: [email or web address]  --  Blood Culture PCR  Escherichia coli      .Blood BLOOD  12-05-24   No growth at 5 days  --  --      .Blood BLOOD  12-05-24   No growth at 5 days  --  --      .Stool  12-01-24   No enteric pathogens isolated.  (Stool culture examined for Salmonella,  Shigella, Campylobacter, Aeromonas, Plesiomonas,  Vibrio, E.coli O157 and Yersinia)  --  --      .Blood BLOOD  11-26-24   No growth at 5 days  --  --      .Blood BLOOD  11-26-24   No growth at 5 days  --  --      Bronchial  11-24-24   Few Candida glabrata  --  Candida (Torulopsis) glabrata      .Blood BLOOD  11-24-24   No growth at 5 days  --  --      .Blood BLOOD  11-24-24   No growth at 5 days  --  --        HIV-1 RNA Quantitative, Viral Load Log: NOT DET. lg /mL (12-18-24 @ 22:14)  Toxoplasma IgG Screen: 78.00 IU/mL (11-15-24 @ 00:41)  CMV IgG Antibody: 1.50 U/mL (11-15-24 @ 00:41)    RADIOLOGY:    <The imaging below has been reviewed and visualized by me independently. Findings as detailed in report below>    < from: Xray Chest 1 View- PORTABLE-Urgent (Xray Chest 1 View- PORTABLE-Urgent .) (12.23.24 @ 14:28) >  IMPRESSION:  No evidence of pulmonary congestion.    < end of copied text >

## 2024-12-23 NOTE — PROGRESS NOTE ADULT - ASSESSMENT
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 Post-op course c/b worsening mental status and acute hypoxic respiratory failure requiring multiple intubations/extubations, no longer intubated, now s/p tracheostomy (as of 12/23/2024) and cardiogenic shock s/p Percutaneous insertion of an intra-aortic balloon pump and septic shock/colonic ischemia s/p total abdominal colectomy with ileostomy on 12/7/24, s/p ex-lap w/ileostomy on 12/9/24. Urology initially consulted 12/9-12/16 for scrotal edema w/ large skin breakdown. Scrotum at that time was larger, more red edematous, swollen with skin breakdown, no crepitus felt. Scrotal/testicular ultrasounds were performed at that time, all showing scrotal edema, no necrotizing infection/abscess. Urology signed off, recommending scrotal support, wound care, no surgical intervention indicated at that time.     Urology was recalled today 12/23 for worsening of scrotal skin sloughing. Per primary team, edema and scrotal swelling has improved. However, there is a layer of brown film overlaying the ventral area of the scrotum encompassing both the ventral layer of skin of the left and right scrotum. The brown film sloughs and bleed superficially when irritated, it is cold to the touch. There is an area of similar film overlaying a section of the right inner thigh. There is clear delineation from the brown layer of film and normal skin-colored scrotum. The normal colored scrotal skin is warm to touch.     Tachycardic, /59 on vaso and levo, s/p tracheostomy     Recs  - Skin breakdown likely 2/2 to prolonged pressor requirements causing constriction of peripheral blood vessels resulting in sloughing of skin.   - No appreciable flow to testicles shown on ultrasound likely also 2/2 to prolonged pressor requirements decreasing blood flow to testicles - should resolve with continued antibiotic regiment and weaning of pressors   - Skin breakdown will likely resolve with continued course of antibiotics and eventual discontinuation of pressors once patient is able to tolerate such.   - Wet to dry dressing to aid in debridement of tissue   - CT A/P scan down to level of knees tomorrow AM to assess level of involvement   - Continue with antibiotics   - Scrotal support  - Continue with wound care  - No urologic surgical intervention indicated at this time - symptoms will hopefully resolve once patient is weaned off pressors   - Urology to follow     Discussed with Dr. 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 Post-op course c/b worsening mental status and acute hypoxic respiratory failure requiring multiple intubations/extubations, no longer intubated, now s/p tracheostomy (as of 12/23/2024) and cardiogenic shock s/p Percutaneous insertion of an intra-aortic balloon pump and septic shock/colonic ischemia s/p total abdominal colectomy with ileostomy on 12/7/24, s/p ex-lap w/ileostomy on 12/9/24. Urology initially consulted 12/9-12/16 for scrotal edema w/ large skin breakdown. Scrotum at that time was larger, more red edematous, swollen with skin breakdown, no crepitus felt. Scrotal/testicular ultrasounds were performed at that time, all showing scrotal edema, no necrotizing infection/abscess. Urology signed off, recommending scrotal support, wound care, no surgical intervention indicated at that time.     Urology was recalled today 12/23 for worsening of scrotal skin sloughing. Per primary team, edema and scrotal swelling has improved. However, there is a layer of brown film overlaying the ventral area of the scrotum encompassing both the ventral layer of skin of the left and right scrotum. The brown film sloughs and bleed superficially when irritated, it is cold to the touch. There is an area of similar film overlaying a section of the right inner thigh. There is clear delineation from the brown layer of film and normal skin-colored scrotum. The normal colored scrotal skin is warm to touch.     Patient is tachycardic, /59 on vaso and levo, s/p tracheostomy 12/17.  His WBC is downtrending 10.4 from 11.4 with improving tachycardia and fever curve. Skin breakdown likely 2/2 to prolonged pressor requirements causing constriction of peripheral blood vessels resulting in sloughing of skin and will likely resolve with continued course of antibiotics and eventual discontinuation of pressors once patient is able to tolerate such.   No appreciable flow to testicles shown on ultrasound likely also 2/2 to prolonged pressor requirements decreasing blood flow to testicles and should resolve with continued antibiotic regiment and weaning of pressors     Recs  - No urologic surgical intervention indicated at this time - symptoms will improve once patient is weaned off pressors   - Wet to dry dressing to aid in debridement of tissue   - CT A/P scan down to level of knees tomorrow AM to assess level of tissue involvement   - Continue with antibiotics   - Scrotal support  - Continue with wound care  - Urology to follow     Discussed with Dr. Guerrero     [facility]  [address] [place] [address], [address] [address] [phone]

## 2024-12-23 NOTE — PROGRESS NOTE ADULT - ASSESSMENT
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 Post op course complicated by acute hypoxic respiratory failure requiring intubation multiple times, most recently on 12/7 with conversion to tracheostomy on 12/17, e.coli bacteremia with RTOR on 12/7 for concern for worsening septic shock and cardiogenic shock s/p balloon pump placement and total abdominal colectomy in setting of ischemic bowel s/p OR on 12/9 for ileostomy creation, and olirugirc MORAIMA requiring CRRT and now intermittent HD. ID called back for fever spike yesterday morning and overnight with possible rapid heart rate and troponin leak being treated for acute cardiac event     Antibiotic Course:  Meropenem ( 11/22-11/29, 11/22-11/29, 12/16-)   Minocycline (12/21-)  Bactrim (11/16-11/24, 12/8-12/11, 12/21-)  Fluconazole (11/16-12/2, 12/12-12/16)   Caspofungin ( 12/6-12/11, 12/17-)  Cefepime (12/10-12/16)   Metronidazole (12/10-12/14)   Ganciclovir (12/7-12/13)   Linezolid (11/23-11/26, 12/6-12/11)   Atovaquone (11/29-12/6)   Zosyn (11/20-11/22,12/6)   Tobramycin (12/6)   Valganciclovir (11/6-12/4)    # s/p OLTx 11/15  # inability jia wean s/p tracheostomy placement  # Ileus/ischemic bowel s/p colectomy on 12/16 and ileostomy creation on 12/9   # severe septic shock 12/6 and pancytopenia, lactic acidosis  # Ecoli bacteremia on 12/6 blood cultures  in context of above (repeat blood cultures since cleared)   # cardiogenic shock s/p impella now removed   # acute hypoxic respiratory failure with b/l pleural effusions (CT chest on 12/15)  #mild intrahepatic biliary ductal dilatation and Septated collection within the gallbladder fossa (seen on 12/14 US liver)  #Testicular Erythema/Edema (no fluid collection on 12/15 testicular US; HSV/VZV PCR of lesion negative)  #pyuria with recent urine culture on 12/16 with e. fecalis ( 10,000-49,000 e. fecalis)   # 12/20 ascitic fluid growing e. fecalis    --High grade fevers today, recommend CT C/A/P  --If worsening clinical course would switch IV Vancomycin to Linezolid  --Reorder/Renew Meropenem pending imaging studies  --Recommend sending repeat sets of blood cultures   --Continue double coverage for Stenotrophomonas with Bactrim and Minocycline  --Continue Caspofungin for now  --Continue to follow CBC with diff  --Continue to follow temperature curve  --Follow up on preliminary blood cultures    I will continue to follow. Please feel free to contact me with any further questions.    Kyle Junior M.D.  [facility] Division of Infectious Disease  8AM-5PM Monday - Friday: Available on [employer] Teams  After Hours and Holidays (or if no response on [employer] Teams): Please contact the Infectious Diseases Office at [phone]    The above assessment and plan were discussed with SICU Team

## 2024-12-23 NOTE — PROGRESS NOTE ADULT - ASSESSMENT
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 Patient's post-op course has been c/b multiple reintubation, required total colectomy w/ ileostomy, and IABP placement 2/2 cardiogenic shock with subsequent removal.     INTERVAL EVENTS:  - No need for MRI   - D/cd Hep Drip  - Protonix BID added  - Repeat TTE ordered  - HD (12/21) with 1L removed  - Tolerated all day CPAP (12/21)    Neuro:  - Opens eyes, Not following commands, off sedation  - pain control w/ oxycodone  - CT head 11/19, 11/21, 11/25, 11/29, 12/5 negative  - EEG: Mild diffuse cerebral dysfunction that is not specific in etiology. No epileptic discharges recorded. No seizures recorded.  - Holding home xanax, Abilify, Zoloft, Remeron, Lexapro  - CT head 12/20 showing age-indeterminate infarct, f/u Neurology recs  - No need for MRI     Resp:  -S/p bedside tracheostomy 12/17  -PRVC 14/500/5/30  -Tolerated all day CPAP (12/21)  -SBT daily  -c/w inhaled bronchodilator  -VBG daily    CV:  -IABP removed 12/10  -s/p dobutamine and amiodarone gtt   -home metoprolol 12.5 q8hr  -trend lactate 2.7 (12/18)  -TTE 12/11 shows EF of 55-60%  - +/- levo  - trending troponin    GI:  -trend LFTs  -NPO w/ tube feeds, KO tube post pyloric  -protonix QD  -high output from ALYSSA drains > repletions ordered    /Renal:  -HD (12/20, 12/21) via R IJ shiley   -Monitor BUN/Cr, I&Os, trend UOP  -Scrotal US 12/15 -> edema, no abscess -> elevate  -nichols    Heme:  -trend H/H  -monitor coags  -trend platelets    ID:  -ABx transition to jeniffer, caspo (12/16-), bactrim, minocycline  - 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

## 2024-12-23 NOTE — PROGRESS NOTE ADULT - SUBJECTIVE AND OBJECTIVE BOX
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 Post-op course c/b worsening mental status and acute hypoxic respiratory failure requiring multiple intubations/extubations, no longer intubated, now s/p tracheostomy (as of 12/23/2024) and cardiogenic shock s/p Percutaneous insertion of an intra-aortic balloon pump and septic shock/colonic ischemia s/p total abdominal colectomy with ileostomy on 12/7/24, s/p ex-lap w/ileostomy on 12/9/24. Urology initially consulted 12/9-12/16 for scrotal edema w/ large skin breakdown. Scrotum at that time was larger, more red edematous, swollen with skin breakdown, no crepitus felt. Scrotal/testicular ultrasounds were performed at that time, all showing scrotal edema, no necrotizing infection/abscess. Urology signed off, recommending scrotal support, wound care, no surgical intervention indicated at that time.     Urology was recalled today 12/23 for worsening of scrotal skin sloughing. Per primary team, edema and scrotal swelling has improved. However, there is a layer of brown film overlaying the ventral area of the scrotum encompassing both the ventral layer of skin of the left and right scrotum. The brown film sloughs and bleed superficially when irritated, it is cold to the touch. There is an area of similar film overlaying a section of the right inner thigh. There is clear delineation from the brown layer of film and normal skin-colored scrotum. The normal colored scrotal skin is warm to touch.     PAST MEDICAL & SURGICAL HISTORY:  Diabetes      Transaminitis      Paroxysmal atrial fibrillation      Depression      BPH (benign prostatic hyperplasia)      Hypertension      Chronic atrial fibrillation      Coronary artery disease      Hepatocellular carcinoma      DM (diabetes mellitus)      HTN (hypertension)      Paroxysmal atrial fibrillation      Cirrhosis      HCC (hepatocellular carcinoma)      History of BPH      History of laparoscopic cholecystectomy      History of lumbar laminectomy      H/O prior ablation treatment      H/O percutaneous left heart catheterization          FAMILY HISTORY:  Family history of coronary artery disease (Father, Sibling, Sibling)    Family history of diabetes mellitus (Father, Mother)    Family history of coronary artery disease (Sibling)    No known  malignancy     Denies alcohol and drug abuse, nonsmoker     MEDICATIONS  (STANDING):  albuterol/ipratropium for Nebulization 3 milliLiter(s) Nebulizer every 6 hours  aspirin  chewable 81 milliGRAM(s) Oral daily  atorvastatin 80 milliGRAM(s) Oral at bedtime  bacitracin   Ointment 1 Application(s) Topical every 12 hours  buDESOnide    Inhalation Suspension 0.5 milliGRAM(s) Inhalation every 12 hours  caspofungin IVPB 50 milliGRAM(s) IV Intermittent every 24 hours  caspofungin IVPB      chlorhexidine 0.12% Liquid 15 milliLiter(s) Oral Mucosa every 12 hours  chlorhexidine 2% Cloths 1 Application(s) Topical <User Schedule>  CRRT Treatment    <Continuous>  cycloSPORINE  , modified (GENGRAF) Solution 25 milliGRAM(s) Oral <User Schedule>  fentaNYL   Patch  12 MICROgram(s)/Hr. 1 Patch Transdermal every 48 hours  heparin   Injectable 5000 Unit(s) SubCutaneous every 12 hours  insulin lispro (ADMELOG) corrective regimen sliding scale   SubCutaneous every 6 hours  insulin NPH human recombinant 14 Unit(s) SubCutaneous every 6 hours  meropenem  IVPB 1000 milliGRAM(s) IV Intermittent every 12 hours  methylPREDNISolone sodium succinate Injectable 32 milliGRAM(s) IV Push <User Schedule>  minocycline IVPB      minocycline IVPB 200 milliGRAM(s) IV Intermittent every 12 hours  norepinephrine Infusion 0.05 MICROgram(s)/kG/Min (8.78 mL/Hr) IV Continuous <Continuous>  nystatin Powder 1 Application(s) Topical every 12 hours  pantoprazole  Injectable 40 milliGRAM(s) IV Push every 12 hours  PrismaSATE Dialysate BGK 4 / 2.5 5000 milliLiter(s) (1500 mL/Hr) CRRT <Continuous>  PrismaSOL Filtration BGK 4 / 2.5 5000 milliLiter(s) (1250 mL/Hr) CRRT <Continuous>  PrismaSOL Filtration BGK 4 / 2.5 5000 milliLiter(s) (250 mL/Hr) CRRT <Continuous>  trimethoprim / sulfamethoxazole IVPB 190 milliGRAM(s) IV Intermittent every 24 hours  vasopressin Infusion 0.03 Unit(s)/Min (4.5 mL/Hr) IV Continuous <Continuous>    MEDICATIONS  (PRN):    Allergies    No Known Allergies    Intolerances      REVIEW OF SYSTEMS: Pertinent positives and negatives as stated in HPI, otherwise negative    Vital signs  T(C): 36.7 (12-23-24 @ 19:00), Max: 38.7 (12-23-24 @ 13:00)  HR: 105 (12-23-24 @ 21:15)  BP: 92/50 (12-23-24 @ 21:15)  SpO2: 100% (12-23-24 @ 21:15)  Wt(kg): --    Physical Exam  Gen: NAD  Pulm: Tracheostomy   CV: Tachycardic   Abd: Ostomy  : Castro catheter in place draining clear brown-orange colored urine, powdered substance overlaying penis, scrotum, and pelvic area. Scrotum less edematous, less red, with layer of brown layer of film that sloughs and bleeds superficially that is cold to the touch. Similar film to inner right thigh. Underside of scrotum skin-colored and warm to touch.   NEURO: no focal neurological deficits  SKIN: warm, dry     LABS:  CBC                       7.8    8.03  )-----------( x        ( 23 Dec 2024 22:27 )             22.9     BMP   12-23    132[L]  |  97  |  118[H]  ----------------------------<  49[LL]  4.6   |  17[L]  |  2.56[H]    Ca    7.9[L]      23 Dec 2024 18:43  Phos  6.4     12-23  Mg     2.4     12-23    TPro  3.7[L]  /  Alb  2.6[L]  /  TBili  8.7[H]  /  DBili  x   /  AST  192[H]  /  ALT  79[H]  /  AlkPhos  134[H]  12-23    PT/INR - ( 23 Dec 2024 13:23 )   PT: 15.4 sec;   INR: 1.34 ratio         PTT - ( 23 Dec 2024 13:23 )  PTT:42.4 sec    Urinalysis Basic - ( 23 Dec 2024 18:43 )    Color: x / Appearance: x / SG: x / pH: x  Gluc: 49 mg/dL / Ketone: x  / Bili: x / Urobili: x   Blood: x / Protein: x / Nitrite: x   Leuk Esterase: x / RBC: x / WBC x   Sq Epi: x / Non Sq Epi: x / Bacteria: x    Culture  Sputum   Culture - Sputum . (12.19.24 @ 14:31)   - Trimethoprim/Sulfamethoxazole: S <=0.5/9.5  Gram Stain:   Moderate polymorphonuclear leukocytes per low power field   Rare Squamous epithelial cells per low power field   Numerous Gram Negative Rods per oil power field   Numerous Yeast like cells per oil power field  - Levofloxacin: S <=0.5  - Minocycline: S  Specimen Source: Trach Asp Tracheal Aspirate  Culture Results:   Moderate Stenotrophomonas maltophilia   Commensal charity consistent with body site  Organism Identification: Stenotrophomonas maltophilia  Organism: Stenotrophomonas maltophilia  Organism: Stenotrophomonas maltophilia  Method Type: ESAU  Method Type: KB  Urine  Culture - Urine (12.16.24 @ 13:10)   - Levofloxacin: S <=1  - Vancomycin: S 1  - Ampicillin: S <=2 Predicts results to ampicillin/sulbactam, amoxacillin-clavulanate and piperacillin-tazobactam.  - Nitrofurantoin: S <=32 Should not be used to treat pyelonephritis.  - Tetracycline: R >8  - Ciprofloxacin: S <=1  Specimen Source: Catheterized Catheterized  Culture Results:   10,000 - 49,000 CFU/mL Enterococcus faecalis  Organism Identification: Enterococcus faecalis  Organism: Enterococcus faecalis  Method Type: ESAU    Radiology:  < from: US Doppler Scrotum (12.23.24 @ 22:17) >  ACC: [id number] EXAM:  US DPLX SCROTUM   ORDERED BY: JAYLAN COLLAZO     ACC: [id number] EXAM:  US SCROTUM AND CONTENTS   ORDERED BY: JAYLAN COLLAZO     PROCEDURE DATE:  12/23/2024          INTERPRETATION:  CLINICAL INFORMATION: Follow-up study for scrotal   necrosis versus abscess.    COMPARISON: Scrotal sonogram 12/15/2024.    TECHNIQUE: Testicular ultrasound utilizing color and spectral Doppler.    FINDINGS:    RIGHT:  Right testis: 2.7 cm x 1.2 cm x 2.2 cm. Normal echogenicity and   echotexture withno masses or areas of architectural distortion. No   appreciable arterial flow with limited venous flow.  Right epididymis: Within normal limits.  Right hydrocele: Small.  Right varicocele: None.    LEFT:  Left testis: 2.7 cm x 1.7 cm x 2.3 cm. Normal echogenicity and   echotexture with no masses or areas of architectural distortion. No   appreciable arterial flow with limited venous flow.  Left epididymis: Within normal limits.  Left hydrocele: Small.  Left varicocele: None.    Diffuse scrotal wall thickening/swelling, improved since prior study.    IMPRESSION:  No appreciable arterial flow with very limited venous flow in in the   testes bilaterally.  Interval decrease in diffuse scrotal edema.  Small bilateral hydroceles.    Continued sonographic follow-up is advised. Additionally, urologic   consultation is recommended.    --- End of Report ---          JAYLAN REYES DO; Resident Radiologist  This document has been electronically signed.  LUPE MARTINEZ MD; Attending Radiologist  This document has been electronically signed. 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 Post-op course c/b worsening mental status and acute hypoxic respiratory failure requiring multiple intubations/extubations, no longer intubated, now s/p tracheostomy (as of 12/23/2024) and cardiogenic shock s/p Percutaneous insertion of an intra-aortic balloon pump and septic shock/colonic ischemia s/p total abdominal colectomy with ileostomy on 12/7/24, s/p ex-lap w/ileostomy on 12/9/24. Urology initially consulted 12/9-12/16 for scrotal edema w/ large skin breakdown. Scrotum at that time was larger, more red edematous, swollen with skin breakdown, no crepitus felt. Scrotal/testicular ultrasounds were performed at that time, all showing scrotal edema, no necrotizing infection/abscess. Urology signed off, recommending scrotal support, wound care, no surgical intervention indicated at that time.     Urology was recalled today 12/23 for worsening of scrotal skin sloughing. Per primary team, edema and scrotal swelling has improved. However, there is a layer of brown film overlaying the ventral area of the scrotum encompassing both the ventral layer of skin of the left and right scrotum. The brown film sloughs and bleed superficially when irritated, it is cold to the touch. There is an area of similar film overlaying a section of the right inner thigh. There is clear delineation from the brown layer of film and normal skin-colored scrotum. The normal colored scrotal skin is warm to touch.     PAST MEDICAL & SURGICAL HISTORY:  Diabetes      Transaminitis      Paroxysmal atrial fibrillation      Depression      BPH (benign prostatic hyperplasia)      Hypertension      Chronic atrial fibrillation      Coronary artery disease      Hepatocellular carcinoma      DM (diabetes mellitus)      HTN (hypertension)      Paroxysmal atrial fibrillation      Cirrhosis      HCC (hepatocellular carcinoma)      History of BPH      History of laparoscopic cholecystectomy      History of lumbar laminectomy      H/O prior ablation treatment      H/O percutaneous left heart catheterization          FAMILY HISTORY:  Family history of coronary artery disease (Father, Sibling, Sibling)    Family history of diabetes mellitus (Father, Mother)    Family history of coronary artery disease (Sibling)    No known  malignancy     Denies alcohol and drug abuse, nonsmoker     MEDICATIONS  (STANDING):  albuterol/ipratropium for Nebulization 3 milliLiter(s) Nebulizer every 6 hours  aspirin  chewable 81 milliGRAM(s) Oral daily  atorvastatin 80 milliGRAM(s) Oral at bedtime  bacitracin   Ointment 1 Application(s) Topical every 12 hours  buDESOnide    Inhalation Suspension 0.5 milliGRAM(s) Inhalation every 12 hours  caspofungin IVPB 50 milliGRAM(s) IV Intermittent every 24 hours  caspofungin IVPB      chlorhexidine 0.12% Liquid 15 milliLiter(s) Oral Mucosa every 12 hours  chlorhexidine 2% Cloths 1 Application(s) Topical <User Schedule>  CRRT Treatment    <Continuous>  cycloSPORINE  , modified (GENGRAF) Solution 25 milliGRAM(s) Oral <User Schedule>  fentaNYL   Patch  12 MICROgram(s)/Hr. 1 Patch Transdermal every 48 hours  heparin   Injectable 5000 Unit(s) SubCutaneous every 12 hours  insulin lispro (ADMELOG) corrective regimen sliding scale   SubCutaneous every 6 hours  insulin NPH human recombinant 14 Unit(s) SubCutaneous every 6 hours  meropenem  IVPB 1000 milliGRAM(s) IV Intermittent every 12 hours  methylPREDNISolone sodium succinate Injectable 32 milliGRAM(s) IV Push <User Schedule>  minocycline IVPB      minocycline IVPB 200 milliGRAM(s) IV Intermittent every 12 hours  norepinephrine Infusion 0.05 MICROgram(s)/kG/Min (8.78 mL/Hr) IV Continuous <Continuous>  nystatin Powder 1 Application(s) Topical every 12 hours  pantoprazole  Injectable 40 milliGRAM(s) IV Push every 12 hours  PrismaSATE Dialysate BGK 4 / 2.5 5000 milliLiter(s) (1500 mL/Hr) CRRT <Continuous>  PrismaSOL Filtration BGK 4 / 2.5 5000 milliLiter(s) (1250 mL/Hr) CRRT <Continuous>  PrismaSOL Filtration BGK 4 / 2.5 5000 milliLiter(s) (250 mL/Hr) CRRT <Continuous>  trimethoprim / sulfamethoxazole IVPB 190 milliGRAM(s) IV Intermittent every 24 hours  vasopressin Infusion 0.03 Unit(s)/Min (4.5 mL/Hr) IV Continuous <Continuous>    MEDICATIONS  (PRN):    Allergies    No Known Allergies    Intolerances      REVIEW OF SYSTEMS: Pertinent positives and negatives as stated in HPI, otherwise negative    Vital signs  T(C): 36.7 (12-23-24 @ 19:00), Max: 38.7 (12-23-24 @ 13:00)  HR: 105 (12-23-24 @ 21:15)  BP: 92/50 (12-23-24 @ 21:15)  SpO2: 100% (12-23-24 @ 21:15)  Wt(kg): --    Physical Exam  Gen: NAD  Pulm: Tracheostomy   CV: Tachycardic   Abd: Ostomy  : Castro catheter in place draining clear brown-orange colored urine, powdered substance overlaying penis, scrotum, and pelvic area. Scrotum less edematous, less red, with layer of brown layer of film that sloughs and bleeds superficially that is cold to the touch. Similar film to inner right thigh. Underside of scrotum skin-colored and warm to touch. No crepitus appreciated over scrotal skin, minimal bedside debridement of sloughing tissue using 4x4 gauze resulted in brisk healthy bleeding  NEURO: no focal neurological deficits  SKIN: warm, dry     LABS:  CBC                       7.8    8.03  )-----------( x        ( 23 Dec 2024 22:27 )             22.9     BMP   12-23    132[L]  |  97  |  118[H]  ----------------------------<  49[LL]  4.6   |  17[L]  |  2.56[H]    Ca    7.9[L]      23 Dec 2024 18:43  Phos  6.4     12-23  Mg     2.4     12-23    TPro  3.7[L]  /  Alb  2.6[L]  /  TBili  8.7[H]  /  DBili  x   /  AST  192[H]  /  ALT  79[H]  /  AlkPhos  134[H]  12-23    PT/INR - ( 23 Dec 2024 13:23 )   PT: 15.4 sec;   INR: 1.34 ratio         PTT - ( 23 Dec 2024 13:23 )  PTT:42.4 sec    Urinalysis Basic - ( 23 Dec 2024 18:43 )    Color: x / Appearance: x / SG: x / pH: x  Gluc: 49 mg/dL / Ketone: x  / Bili: x / Urobili: x   Blood: x / Protein: x / Nitrite: x   Leuk Esterase: x / RBC: x / WBC x   Sq Epi: x / Non Sq Epi: x / Bacteria: x    Culture  Sputum   Culture - Sputum . (12.19.24 @ 14:31)   - Trimethoprim/Sulfamethoxazole: S <=0.5/9.5  Gram Stain:   Moderate polymorphonuclear leukocytes per low power field   Rare Squamous epithelial cells per low power field   Numerous Gram Negative Rods per oil power field   Numerous Yeast like cells per oil power field  - Levofloxacin: S <=0.5  - Minocycline: S  Specimen Source: Trach Asp Tracheal Aspirate  Culture Results:   Moderate Stenotrophomonas maltophilia   Commensal charity consistent with body site  Organism Identification: Stenotrophomonas maltophilia  Organism: Stenotrophomonas maltophilia  Organism: Stenotrophomonas maltophilia  Method Type: ESAU  Method Type:   Urine  Culture - Urine (12.16.24 @ 13:10)   - Levofloxacin: S <=1  - Vancomycin: S 1  - Ampicillin: S <=2 Predicts results to ampicillin/sulbactam, amoxacillin-clavulanate and piperacillin-tazobactam.  - Nitrofurantoin: S <=32 Should not be used to treat pyelonephritis.  - Tetracycline: R >8  - Ciprofloxacin: S <=1  Specimen Source: Catheterized Catheterized  Culture Results:   10,000 - 49,000 CFU/mL Enterococcus faecalis  Organism Identification: Enterococcus faecalis  Organism: Enterococcus faecalis  Method Type: ESAU    Radiology:  < from: US Doppler Scrotum (12.23.24 @ 22:17) >  ACC: [id number] EXAM:  US DPLX SCROTUM   ORDERED BY: JAYLAN COLLAZO     ACC: [id number] EXAM:  US SCROTUM AND CONTENTS   ORDERED BY: JAYLAN COLLAZO     PROCEDURE DATE:  12/23/2024          INTERPRETATION:  CLINICAL INFORMATION: Follow-up study for scrotal   necrosis versus abscess.    COMPARISON: Scrotal sonogram 12/15/2024.    TECHNIQUE: Testicular ultrasound utilizing color and spectral Doppler.    FINDINGS:    RIGHT:  Right testis: 2.7 cm x 1.2 cm x 2.2 cm. Normal echogenicity and   echotexture withno masses or areas of architectural distortion. No   appreciable arterial flow with limited venous flow.  Right epididymis: Within normal limits.  Right hydrocele: Small.  Right varicocele: None.    LEFT:  Left testis: 2.7 cm x 1.7 cm x 2.3 cm. Normal echogenicity and   echotexture with no masses or areas of architectural distortion. No   appreciable arterial flow with limited venous flow.  Left epididymis: Within normal limits.  Left hydrocele: Small.  Left varicocele: None.    Diffuse scrotal wall thickening/swelling, improved since prior study.    IMPRESSION:  No appreciable arterial flow with very limited venous flow in in the   testes bilaterally.  Interval decrease in diffuse scrotal edema.  Small bilateral hydroceles.    Continued sonographic follow-up is advised. Additionally, urologic   consultation is recommended.    --- End of Report ---          JAYLAN REYES DO; Resident Radiologist  This document has been electronically signed.  LUPE MARTINEZ MD; Attending Radiologist  This document has been electronically signed. Dec 23 2024 10:45PM    < end of copied text >

## 2024-12-23 NOTE — CHART NOTE - NSCHARTNOTEFT_GEN_A_CORE
Interim events:  Contacted primary team regarding plan for MR. Per discussion - family + transplant team prefer to hold off on MR until patient is more stable as would not change present management.    Impresion  Ongoing diminished mental status. CTH on 12/20/2024 revealed age indeterminate posterior left frontal lobe infarct which was not seen in the CT on 12/15. Mechanism: ESUS likely, although possibly cardioembolic and  related to recently documented low EF, despite EF having subsequently improved. Stroke w/u is incomplete.    Additional recommendations  [x] from neurovascular standpoint would continue ASA 81 daily for now if no medical contraindications  [] MR brain + MRA H/N when stable -> please notify stroke team at [phone] when imaging performed  [] HbA1C and Lipid Panel.  [x] DVT prophylaxis per SICU  [] Telemonitoring; Neurochecks and vital signs Q4H  [x] BP goal <140/90 if able  [x] NPO until clears dysphagia screen, otherwise swallow evaluation  [] Fall, aspiration precautions    Stroke team to peripherally follow at this time - please notify at [phone] when workup performed. Thank you.

## 2024-12-23 NOTE — PROGRESS NOTE ADULT - SUBJECTIVE AND OBJECTIVE BOX
Transplant Surgery - Multidisciplinary Rounds  --------------------------------------------------------------  OLT   11/15/2024        POD#38  Colectomy 12/7/2024   POD#16  IABP 12/7 removed 12/10  Tracheostomy 12/17/2024    Present:   Patient seen and examined with multidisciplinary Transplant team including Surgeon: Dr. James, Dr. Sorto, JAZZ Colby, Hepatologist: Dr. Hidalgo and ICU team in AM rounds.   Disciplines not in attendance will be notified of the plan.     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     s/p OLT 11/15 with post op course c/b:  Hypoxia: Reintubated 11/20, extubated 11/24->reintubated 11/24, extubated 11/26, reintubated 12/7  Ileus   Fever  A fib  AMS/Seizure   L brachial DVT  Neutropenia  E coli Bacteremia (12/6)  s/p Coloctomy 12/7.   IABP 12/7, removed 12/10    Interval/Overnight Events:   - afebrile, hypotensive, started on levo  - TTE unchanged from prior   -  qd started    Immunosuppression:  -Cyclo by level ,  qd, Solu 32  -ongoing monitoring for signs of rejection      TX DATA HERE    ROS: Unable to assess patient is lethargic, s/p tracheostomy    PHYSICAL EXAM:   Constitutional: trach to vent, lethargic  Eyes:  PERRLA  ENMT: nc/at, no thrush   Neck: supple   Respiratory: CTA B/L  Cardiovascular: RRR  Gastrointestinal: incision clean/dry/intact + Ostomy pink   Genitourinary: +scrotal edema, Castro in place  Extremities: SCD's in place and working bilaterally, + LE edema  Neurological: trach to vent , sedated   Skin: no rashes, ulcerations, lesions    Transplant Surgery - Multidisciplinary Rounds  --------------------------------------------------------------  OLT   11/15/2024        POD#38  Colectomy 12/7/2024   POD#16  IABP 12/7 removed 12/10  Tracheostomy 12/17/2024    Present:   Patient seen and examined with multidisciplinary Transplant team including Surgeon: Dr. James, Dr. Sorto, JAZZ Colby, Hepatologist: Dr. Hidalgo and ICU team in AM rounds.   Disciplines not in attendance will be notified of the plan.     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     s/p OLT 11/15 with post op course c/b:  ·	Hypoxia: Reintubated 11/20, extubated 11/24->reintubated 11/24, extubated 11/26, reintubated 12/7, trached 12/17  ·	Ileus   ·	A fib  ·	AMS/Seizure   ·	L brachial DVT  ·	Neutropenia  ·	E coli Bacteremia (12/6)  ·	s/p Coloctomy 12/7.   ·	IABP 12/7, removed 12/10  ·	Ec Faecalis UTI (12/16)  ·	Stenotrophamonas in trach aspirate (12/19)    Interval/Overnight Events:   - hypotensive, started on levo overnight, febrile this am  - lactate uptrending   - TTE unchanged from prior   -  qd started    Immunosuppression:  -Cyclo by level ,  qd, Solu 32  -ongoing monitoring for signs of rejection    MEDICATIONS  (STANDING):  albuterol/ipratropium for Nebulization 3 milliLiter(s) Nebulizer every 6 hours  aspirin  chewable 81 milliGRAM(s) Oral daily  atorvastatin 80 milliGRAM(s) Oral at bedtime  buDESOnide    Inhalation Suspension 0.5 milliGRAM(s) Inhalation every 12 hours  caspofungin IVPB 50 milliGRAM(s) IV Intermittent every 24 hours  caspofungin IVPB      chlorhexidine 0.12% Liquid 15 milliLiter(s) Oral Mucosa every 12 hours  chlorhexidine 2% Cloths 1 Application(s) Topical <User Schedule>  cycloSPORINE  , modified (GENGRAF) Solution 25 milliGRAM(s) Oral <User Schedule>  fentaNYL   Patch  12 MICROgram(s)/Hr. 1 Patch Transdermal every 48 hours  heparin   Injectable 5000 Unit(s) SubCutaneous every 12 hours  insulin lispro (ADMELOG) corrective regimen sliding scale   SubCutaneous every 6 hours  insulin NPH human recombinant 14 Unit(s) SubCutaneous every 6 hours  meropenem  IVPB 1000 milliGRAM(s) IV Intermittent every 12 hours  methylPREDNISolone sodium succinate Injectable 32 milliGRAM(s) IV Push <User Schedule>  minocycline IVPB      minocycline IVPB 200 milliGRAM(s) IV Intermittent every 12 hours  mycophenolate mofetil Suspension 250 milliGRAM(s) Oral <User Schedule>  norepinephrine Infusion 0.05 MICROgram(s)/kG/Min (8.78 mL/Hr) IV Continuous <Continuous>  nystatin Powder 1 Application(s) Topical every 12 hours  pantoprazole  Injectable 40 milliGRAM(s) IV Push every 12 hours  trimethoprim / sulfamethoxazole IVPB 190 milliGRAM(s) IV Intermittent every 24 hours  vancomycin  IVPB 1000 milliGRAM(s) IV Intermittent once  vasopressin Infusion 0.03 Unit(s)/Min (4.5 mL/Hr) IV Continuous <Continuous>    PAST MEDICAL & SURGICAL HISTORY:  Diabetes  Transaminitis  Paroxysmal atrial fibrillation  Depression  BPH (benign prostatic hyperplasia)  Hypertension  Chronic atrial fibrillation  Coronary artery disease  Hepatocellular carcinoma  DM (diabetes mellitus)  HTN (hypertension)  Paroxysmal atrial fibrillation  Cirrhosis  HCC (hepatocellular carcinoma)  History of BPH  History of laparoscopic cholecystectomy  History of lumbar laminectomy  H/O prior ablation treatment  H/O percutaneous left heart catheterization    Vital Signs Last 24 Hrs  T(C): 38.3 (23 Dec 2024 11:00), Max: 38.3 (23 Dec 2024 11:00)  T(F): 100.9 (23 Dec 2024 11:00), Max: 100.9 (23 Dec 2024 11:00)  HR: 126 (23 Dec 2024 13:30) (98 - 128)  BP: 105/68 (23 Dec 2024 13:30) (59/39 - 163/63)  BP(mean): 82 (23 Dec 2024 13:30) (44 - 96)  RR: 34 (23 Dec 2024 13:30) (18 - 57)  SpO2: 99% (23 Dec 2024 13:30) (95% - 100%)    Parameters below as of 23 Dec 2024 12:07  Patient On (Oxygen Delivery Method): ventilator    I&O's Summary    22 Dec 2024 07:01  -  23 Dec 2024 07:00  --------------------------------------------------------  IN: 2771.9 mL / OUT: 1040 mL / NET: 1731.9 mL    23 Dec 2024 07:01  -  23 Dec 2024 15:01  --------------------------------------------------------  IN: 578 mL / OUT: 15 mL / NET: 563 mL                        9.7    10.37 )-----------( 69       ( 23 Dec 2024 12:32 )             29.1     12-23    133[L]  |  97  |  102[H]  ----------------------------<  114[H]  4.7   |  17[L]  |  2.47[H]    Ca    7.5[L]      23 Dec 2024 12:32  Phos  6.7     12-23  Mg     2.5     12-23    TPro  3.8[L]  /  Alb  2.6[L]  /  TBili  9.2[H]  /  DBili  x   /  AST  136[H]  /  ALT  80[H]  /  AlkPhos  161[H]  12-23      Culture - Body Fluid with Gram Stain (collected 12-20-24 @ 18:23)  Source: Abdominal Fl  Gram Stain (12-21-24 @ 05:56):    polymorphonuclear leukocytes seen    Gram positive cocci in pairs seen    by cytocentrifuge  Preliminary Report (12-21-24 @ 21:12):    Rare Enterococcus faecalis  Organism: Enterococcus faecalis (12-22-24 @ 19:26)  Organism: Enterococcus faecalis (12-22-24 @ 19:26)    Culture - Sputum (collected 12-19-24 @ 14:31)  Source: Trach Asp Tracheal Aspirate  Gram Stain (12-20-24 @ 00:18):    Moderate polymorphonuclear leukocytes per low power field    Rare Squamous epithelial cells per low power field    Numerous Gram Negative Rods per oil power field    Numerous Yeast like cells per oil power field  Final Report (12-21-24 @ 14:57):    Moderate Stenotrophomonas maltophilia    Commensal charity consistent with body site  Organism: Stenotrophomonas maltophilia (12-21-24 @ 14:57)  Organism: Stenotrophomonas maltophilia (12-21-24 @ 14:57)  Organism: Stenotrophomonas maltophilia (12-21-24 @ 14:57)    Culture - Blood (collected 12-19-24 @ 03:28)  Source: .Blood BLOOD  Preliminary Report (12-23-24 @ 07:01):    No growth at 4 days    Culture - Blood (collected 12-19-24 @ 03:28)  Source: .Blood BLOOD  Preliminary Report (12-23-24 @ 07:01):    No growth at 4 days      ROS: Unable to assess patient is lethargic, s/p tracheostomy    PHYSICAL EXAM:   Constitutional: trach to vent, lethargic  Eyes:  PERRLA  ENMT: nc/at, no thrush   Neck: supple   Respiratory: CTA B/L  Cardiovascular: RRR  Gastrointestinal: incision clean/dry/intact + Ostomy pink, wound vac  Genitourinary: +scrotal edema, Castro in place  Extremities: SCD's in place and working bilaterally, + LE edema  Neurological: trach to vent , sedated   Skin: no rashes, ulcerations, lesions

## 2024-12-23 NOTE — PROGRESS NOTE ADULT - ASSESSMENT
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 Post-op course c/b worsening mental status and acute hypoxic respiratory failure requiring multiple intubations/extubations, currently extubated (as of 11/26/24) and colonic ileus s/p several rounds of Relistor and Neostigmine with NGT and rectal tube currently in place now with septic shock of unclear etiology. Transplant nephrology consulted for metabolic acidosis and MORAIMA.    1. s/p OLT 11/15/24 with oliguric MORAIMA in setting of septic shock.  Likely hemodynamically mediated in setting of cardiogenic and septic shock on multiple vasopressors and prior IABP.   CT AP non-con: Bilateral renal cysts including cysts with peripheral calcification in the left kidney.  Initiated on CRRT 12/7/24- 12/15/24 at 1AM stopped. s/p iHD 12/18/24 however required levophed.   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 Post-op course c/b worsening mental status and acute hypoxic respiratory failure requiring multiple intubations/extubations, currently extubated (as of 11/26/24) and colonic ileus s/p several rounds of Relistor and Neostigmine with NGT and rectal tube currently in place now with septic shock of unclear etiology. Transplant nephrology consulted for metabolic acidosis and MORAIMA.    1. s/p OLT 11/15/24 with oliguric MORAIMA in setting of septic shock.  Likely hemodynamically mediated in setting of cardiogenic and septic shock on multiple vasopressors and prior IABP.   CT AP non-con: Bilateral renal cysts including cysts with peripheral calcification in the left kidney.  Initiated on CRRT 12/7/24- 12/15/24 at 1AM stopped. s/p iHD 12/18/24 however required levophed.   now on 2 pressors , will do CRRT today , plan to start after CT scan is done

## 2024-12-23 NOTE — PROGRESS NOTE ADULT - NS ATTEND AMEND GEN_ALL_CORE FT
74M w/ PMHx of HTN, DM, AF, BPH, MASH cirrhosis and 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 Patient's post-op course has been c/b multiple reintubation, required total colectomy w/ ileostomy, and IABP placement 2/2 cardiogenic shock with subsequent removal.   S/P prolonged intubation, s/p trached and PEG    Poor neuro exam, intermittently appears to be more awake  Failed SBT, on full vent support, FiO2 remains on 30%, CXR unchanged  Worsening of hemodynamics on higher pressure requirement, add Vasopressin,   Ti continues to increase, demand ischemia as per cardiology  Concern for SB ischemia, CT C/A/P with contrast  Needs HD or CRRT after contrast load  LFT very high  Abx Christopher, Minocycline, Bactrim, Caspo, resp culture Stenotrophomonas  ISS/Lantus dose adjustment  Pharm DVT ppx

## 2024-12-24 NOTE — PROGRESS NOTE ADULT - SUBJECTIVE AND OBJECTIVE BOX
Transplant Surgery - Multidisciplinary Rounds  --------------------------------------------------------------  OLT   11/15/2024        POD#39  Colectomy 12/7/2024   POD#17  IABP 12/7 removed 12/10  Tracheostomy 12/17/2024    Present:   Patient seen and examined with multidisciplinary Transplant team including Surgeon: Dr. James, Dr. Sorto, JAZZ Colby, Hepatologist: Dr. Hidalgo and ICU team in AM rounds.   Disciplines not in attendance will be notified of the plan.     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     s/p OLT 11/15 with post op course c/b:  ·	Hypoxia: Reintubated 11/20, extubated 11/24->reintubated 11/24, extubated 11/26, reintubated 12/7, trached 12/17  ·	Ileus   ·	A fib  ·	AMS/Seizure   ·	L brachial DVT  ·	Neutropenia  ·	E coli Bacteremia (12/6)  ·	s/p Coloctomy 12/7.   ·	IABP 12/7, removed 12/10  ·	Ec Faecalis UTI (12/16)  ·	Stenotrophamonas in trach aspirate (12/19)    Interval/Overnight Events:   - hypotensive on levo/vaso, afebrile ON  - resumed CRRT  - troponin trending up  - 1 PRBC x1  - US doppler scrotum: no arterial flow/limited venous flow, bl hydrocele. urology recs : CT scrotum      Immunosuppression:  -Cyclo by level , MMF HELD, Solu 32  -ongoing monitoring for signs of rejection    TX DATA HERE    ROS: Unable to assess patient is lethargic, s/p tracheostomy    PHYSICAL EXAM:   Constitutional: trach to vent, lethargic  Eyes:  PERRLA  ENMT: nc/at, no thrush   Neck: supple   Respiratory: CTA B/L  Cardiovascular: RRR  Gastrointestinal: incision clean/dry/intact + Ostomy pink, wound vac  Genitourinary: +scrotal edema, Castro in place  Extremities: SCD's in place and working bilaterally, + LE edema  Neurological: trach to vent , sedated   Skin: no rashes, ulcerations, lesions    Transplant Surgery - Multidisciplinary Rounds  --------------------------------------------------------------  OLT   11/15/2024        POD#39  Colectomy 12/7/2024   POD#17  IABP 12/7 removed 12/10  Tracheostomy 12/17/2024    Present:   Patient seen and examined with multidisciplinary Transplant team including Surgeon: Dr. James, Dr. Sorto, JAZZ Colby, Hepatologist: Dr. Hidalgo and ICU team in AM rounds.   Disciplines not in attendance will be notified of the plan.     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     s/p OLT 11/15 with post op course c/b:  ·	Hypoxia: Reintubated 11/20, extubated 11/24->reintubated 11/24, extubated 11/26, reintubated 12/7, trached 12/17  ·	Ileus   ·	A fib  ·	AMS/Seizure   ·	L brachial DVT  ·	Neutropenia  ·	E coli Bacteremia (12/6)  ·	s/p Coloctomy 12/7.   ·	IABP 12/7, removed 12/10  ·	Ec Faecalis UTI (12/16)  ·	Stenotrophamonas in trach aspirate (12/19)    Interval/Overnight Events:   - hypotensive on levo/vaso, afebrile ON  - resumed CRRT  - troponin trending up  - 1 PRBC x1  - US doppler scrotum: no arterial flow/limited venous flow, bl hydrocele. urology recs : CT scrotum      Immunosuppression:  -Cyclo by level , MMF HELD, Solu 32  -ongoing monitoring for signs of rejection      MEDICATIONS  (STANDING):  albuterol/ipratropium for Nebulization 3 milliLiter(s) Nebulizer every 6 hours  aspirin  chewable 81 milliGRAM(s) Oral daily  atorvastatin 80 milliGRAM(s) Oral at bedtime  buDESOnide    Inhalation Suspension 0.5 milliGRAM(s) Inhalation every 12 hours  caspofungin IVPB 50 milliGRAM(s) IV Intermittent every 24 hours  caspofungin IVPB      chlorhexidine 0.12% Liquid 15 milliLiter(s) Oral Mucosa every 12 hours  chlorhexidine 2% Cloths 1 Application(s) Topical <User Schedule>  CRRT Treatment    <Continuous>  cycloSPORINE  , modified (GENGRAF) Solution 25 milliGRAM(s) Oral <User Schedule>  fentaNYL   Patch  12 MICROgram(s)/Hr. 1 Patch Transdermal every 48 hours  heparin   Injectable 5000 Unit(s) SubCutaneous every 12 hours  insulin lispro (ADMELOG) corrective regimen sliding scale   SubCutaneous every 6 hours  meropenem  IVPB 1000 milliGRAM(s) IV Intermittent every 12 hours  methylPREDNISolone sodium succinate Injectable 32 milliGRAM(s) IV Push <User Schedule>  minocycline IVPB      minocycline IVPB 200 milliGRAM(s) IV Intermittent every 12 hours  mupirocin 2% Ointment 1 Application(s) Topical every 12 hours  norepinephrine Infusion 0.05 MICROgram(s)/kG/Min (8.78 mL/Hr) IV Continuous <Continuous>  nystatin Powder 1 Application(s) Topical every 12 hours  pantoprazole  Injectable 40 milliGRAM(s) IV Push every 12 hours  PrismaSATE Dialysate BK 0 / 3.5 5000 milliLiter(s) (1500 mL/Hr) CRRT <Continuous>  PrismaSOL Filtration BGK 4 / 2.5 5000 milliLiter(s) (1250 mL/Hr) CRRT <Continuous>  PrismaSOL Filtration BGK 4 / 2.5 5000 milliLiter(s) (250 mL/Hr) CRRT <Continuous>  trimethoprim / sulfamethoxazole IVPB 190 milliGRAM(s) IV Intermittent every 24 hours  vasopressin Infusion 0.03 Unit(s)/Min (4.5 mL/Hr) IV Continuous <Continuous>    PAST MEDICAL & SURGICAL HISTORY:  Diabetes  Transaminitis  Paroxysmal atrial fibrillation  Depression  BPH (benign prostatic hyperplasia)  Hypertension  Chronic atrial fibrillation  Coronary artery disease  Hepatocellular carcinoma  DM (diabetes mellitus)  HTN (hypertension)  Paroxysmal atrial fibrillation  Cirrhosis  HCC (hepatocellular carcinoma)  History of BPH  History of laparoscopic cholecystectomy  History of lumbar laminectomy  H/O prior ablation treatment  H/O percutaneous left heart catheterization    Vital Signs Last 24 Hrs  T(C): 37.4 (24 Dec 2024 07:00), Max: 38.7 (23 Dec 2024 13:00)  T(F): 99.3 (24 Dec 2024 07:00), Max: 101.7 (23 Dec 2024 13:00)  HR: 116 (24 Dec 2024 08:00) (98 - 143)  BP: 66/50 (24 Dec 2024 08:00) (59/39 - 191/74)  BP(mean): 55 (24 Dec 2024 08:00) (44 - 113)  RR: 30 (24 Dec 2024 08:00) (18 - 57)  SpO2: 92% (24 Dec 2024 08:00) (92% - 100%)    Parameters below as of 24 Dec 2024 07:00  Patient On (Oxygen Delivery Method): ventilator    O2 Concentration (%): 30    I&O's Summary    23 Dec 2024 07:01  -  24 Dec 2024 07:00  --------------------------------------------------------  IN: 2732.1 mL / OUT: 1836 mL / NET: 896.1 mL    24 Dec 2024 07:01  -  24 Dec 2024 08:33  --------------------------------------------------------  IN: 73.5 mL / OUT: 0 mL / NET: 73.5 mL                        9.3    8.73  )-----------( 36       ( 24 Dec 2024 02:19 )             27.3     12-24    132[L]  |  98  |  94[H]  ----------------------------<  110[H]  4.7   |  17[L]  |  2.00[H]    Ca    7.6[L]      24 Dec 2024 04:48  Phos  5.3     12-24  Mg     2.4     12-24    TPro  3.9[L]  /  Alb  2.7[L]  /  TBili  8.9[H]  /  DBili  x   /  AST  197[H]  /  ALT  89[H]  /  AlkPhos  124[H]  12-24    Culture - Bronchial (collected 12-23-24 @ 16:37)  Source: Combi-Cath  Gram Stain (12-23-24 @ 22:50):    No polymorphonuclear cells seen per low power field    No squamous epithelial cells per low power field    No organisms seen per oil power field    Culture - Body Fluid with Gram Stain (collected 12-20-24 @ 18:23)  Source: Abdominal Fl  Gram Stain (12-21-24 @ 05:56):    polymorphonuclear leukocytes seen    Gram positive cocci in pairs seen    by cytocentrifuge  Preliminary Report (12-21-24 @ 21:12):    Rare Enterococcus faecalis  Organism: Enterococcus faecalis (12-22-24 @ 19:26)  Organism: Enterococcus faecalis (12-22-24 @ 19:26)    Culture - Sputum (collected 12-19-24 @ 14:31)  Source: Trach Asp Tracheal Aspirate  Gram Stain (12-20-24 @ 00:18):    Moderate polymorphonuclear leukocytes per low power field    Rare Squamous epithelial cells per low power field    Numerous Gram Negative Rods per oil power field    Numerous Yeast like cells per oil power field  Final Report (12-21-24 @ 14:57):    Moderate Stenotrophomonas maltophilia    Commensal charity consistent with body site  Organism: Stenotrophomonas maltophilia (12-21-24 @ 14:57)  Organism: Stenotrophomonas maltophilia (12-21-24 @ 14:57)  Organism: Stenotrophomonas maltophilia (12-21-24 @ 14:57)    Culture - Blood (collected 12-19-24 @ 03:28)  Source: .Blood BLOOD  Final Report (12-24-24 @ 07:00):    No growth at 5 days    Culture - Blood (collected 12-19-24 @ 03:28)  Source: .Blood BLOOD  Final Report (12-24-24 @ 07:00):    No growth at 5 days      ROS: Unable to assess patient is lethargic, s/p tracheostomy    PHYSICAL EXAM:   Constitutional: trach to vent, lethargic  Eyes:  PERRLA  ENMT: nc/at, no thrush   Neck: supple   Respiratory: CTA B/L  Cardiovascular: RRR  Gastrointestinal: incision clean/dry/intact + Ostomy pink, wound vac  Genitourinary: +scrotal edema, Castro in place  Extremities: SCD's in place and working bilaterally, + LE edema  Neurological: trach to vent ,  Skin: no rashes, ulcerations, lesions

## 2024-12-24 NOTE — PROGRESS NOTE ADULT - SUBJECTIVE AND OBJECTIVE BOX
Follow Up:      Interval History:    REVIEW OF SYSTEMS  [  ] ROS unobtainable because:    [  ] All other systems negative except as noted below    Constitutional:  [ ] fever [ ] chills  [ ] weight loss  [ ] weakness  Skin:  [ ] rash [ ] phlebitis	  Eyes: [ ] icterus [ ] pain  [ ] discharge	  ENMT: [ ] sore throat  [ ] thrush [ ] ulcers [ ] exudates  Respiratory: [ ] dyspnea [ ] hemoptysis [ ] cough [ ] sputum	  Cardiovascular:  [ ] chest pain [ ] palpitations [ ] edema	  Gastrointestinal:  [ ] nausea [ ] vomiting [ ] diarrhea [ ] constipation [ ] pain	  Genitourinary:  [ ] dysuria [ ] frequency [ ] hematuria [ ] discharge [ ] flank pain  [ ] incontinence  Musculoskeletal:  [ ] myalgias [ ] arthralgias [ ] arthritis  [ ] back pain  Neurological:  [ ] headache [ ] seizures  [ ] confusion/altered mental status    Allergies  No Known Allergies        ANTIMICROBIALS:  caspofungin IVPB    caspofungin IVPB 50 every 24 hours  meropenem  IVPB 1000 every 12 hours  minocycline IVPB    minocycline IVPB 200 every 12 hours  trimethoprim / sulfamethoxazole IVPB 390 every 12 hours      OTHER MEDS:  MEDICATIONS  (STANDING):  albuterol/ipratropium for Nebulization 3 every 6 hours PRN  aspirin  chewable 81 daily  atorvastatin 80 at bedtime  buDESOnide    Inhalation Suspension 0.5 every 12 hours  cycloSPORINE  , modified (GENGRAF) Solution 25 <User Schedule>  fentaNYL   Patch  12 MICROgram(s)/Hr. 1 every 48 hours  heparin   Injectable 5000 every 12 hours  insulin lispro (ADMELOG) corrective regimen sliding scale  every 6 hours  methylPREDNISolone sodium succinate Injectable 32 <User Schedule>  midodrine 10 every 8 hours  norepinephrine Infusion 0.05 <Continuous>  pantoprazole  Injectable 40 every 12 hours  vasopressin Infusion 0.03 <Continuous>      Vital Signs Last 24 Hrs  T(C): 37.7 (24 Dec 2024 15:00), Max: 37.7 (24 Dec 2024 15:00)  T(F): 99.9 (24 Dec 2024 15:00), Max: 99.9 (24 Dec 2024 15:00)  HR: 105 (24 Dec 2024 16:30) (98 - 128)  BP: 123/47 (24 Dec 2024 16:30) (51/16 - 191/74)  BP(mean): 68 (24 Dec 2024 16:30) (23 - 113)  RR: 27 (24 Dec 2024 16:30) (16 - 47)  SpO2: 100% (24 Dec 2024 16:30) (92% - 100%)    Parameters below as of 24 Dec 2024 15:00  Patient On (Oxygen Delivery Method): ventilator    O2 Concentration (%): 30    PHYSICAL EXAMINATION:  General: Alert and Awake, NAD  HEENT: PERRL, EOMI  Neck: Supple  Cardiac: RRR, No M/R/G  Resp: CTAB, No Wh/Rh/Ra  Abdomen: NBS, NT/ND, No HSM, No rigidity or guarding  MSK: No LE edema. No Calf tenderness  : No nichols  Skin: No rashes or lesions. Skin is warm and dry to the touch.   Neuro: Alert and Awake. CN 2-12 Grossly intact. Moves all four extremities spontaneously.  Psych: Calm, Pleasant, Cooperative                          9.2    11.76 )-----------( 38       ( 24 Dec 2024 13:57 )             25.9       12-24    134[L]  |  101  |  67[H]  ----------------------------<  111[H]  4.1   |  18[L]  |  1.32[H]    Ca    7.8[L]      24 Dec 2024 13:57  Phos  3.9     12-24  Mg     2.3     12-24    TPro  4.3[L]  /  Alb  2.9[L]  /  TBili  10.2[H]  /  DBili  x   /  AST  196[H]  /  ALT  95[H]  /  AlkPhos  144[H]  12-24      Urinalysis Basic - ( 24 Dec 2024 13:57 )    Color: x / Appearance: x / SG: x / pH: x  Gluc: 111 mg/dL / Ketone: x  / Bili: x / Urobili: x   Blood: x / Protein: x / Nitrite: x   Leuk Esterase: x / RBC: x / WBC x   Sq Epi: x / Non Sq Epi: x / Bacteria: x        MICROBIOLOGY:  Vancomycin Level, Random: 16.6 ug/mL (12-24-24 @ 06:25)  v  Combi-Cath  12-23-24   No growth to date  --    No polymorphonuclear cells seen per low power field  No squamous epithelial cells per low power field  No organisms seen per oil power field      Abdominal Fl  12-20-24   Rare Enterococcus faecalis  --  Enterococcus faecalis      Trach Asp Tracheal Aspirate  12-19-24   Moderate Stenotrophomonas maltophilia  Commensal charity consistent with body site  --  Stenotrophomonas maltophilia      .Blood BLOOD  12-19-24   No growth at 5 days  --  --      Catheterized Catheterized  12-16-24   10,000 - 49,000 CFU/mL Enterococcus faecalis  --  Enterococcus faecalis      .Blood BLOOD  12-16-24   No growth at 5 days  --  --      Combi-Cath  12-16-24   Commensal charity consistent with body site  --    No polymorphonuclear leukocytes seen per low power field  No Squamous epithelial cells seen per low power field  No organisms seen per oil power field      .Blood BLOOD  12-16-24   No growth at 5 days  --  --      Combi-Cath  12-14-24   Commensal charity consistent with body site  --    Numerous polymorphonuclear leukocytes per low power field  No Squamous epithelial cells per low power field  No organisms seen per oil power field      .Other  12-12-24   No fungus isolated at 1 week.  --  --      .Blood BLOOD  12-07-24   No growth at 5 days  --  --      Body Fluid  12-07-24   No growth at 5 days  --    polymorphonuclear leukocytes seen  No organisms seen  by cytocentrifuge      .Blood BLOOD  12-06-24   Growth in aerobic and anaerobic bottles: Escherichia coli  See previous culture [id number]  --    Growth in aerobic bottle: Gram Negative Rods  Growth in anaerobic bottle: Gram Negative Rods      .Blood BLOOD  12-06-24   Growth in aerobic and anaerobic bottles: Escherichia coli  Direct identification is available within approximately 3-5  hours either by Blood Panel Multiplexed PCR or Direct  MALDI-TOF. Details: [email or web address]  --  Blood Culture PCR  Escherichia coli      .Blood BLOOD  12-05-24   No growth at 5 days  --  --      .Blood BLOOD  12-05-24   No growth at 5 days  --  --      .Stool  12-01-24   No enteric pathogens isolated.  (Stool culture examined for Salmonella,  Shigella, Campylobacter, Aeromonas, Plesiomonas,  Vibrio, E.coli O157 and Yersinia)  --  --      .Blood BLOOD  11-26-24   No growth at 5 days  --  --      .Blood BLOOD  11-26-24   No growth at 5 days  --  --      Bronchial  11-24-24   Few Candida glabrata  --  Candida (Torulopsis) glabrata        HIV-1 RNA Quantitative, Viral Load Log: NOT DET. lg /mL (12-18-24 @ 22:14)  Toxoplasma IgG Screen: 78.00 IU/mL (11-15-24 @ 00:41)  CMV IgG Antibody: 1.50 U/mL (11-15-24 @ 00:41)    CMVPCR Log: Det <1.54 Assay Dynamic Range: 34.5 to 1.0E+07 IU/mL (1.54 to 7.00 Log10 IU/mL)  Assay lower limit of quantification (LLOQ) is 34.5 IU/mL (1.54 Log10  IU/mL)  CMV DNA detected below the LLOQ will be reported as Detected < 34.5 IU/mL  (<1.54 Log10 IU/mL)  Nydia Cytomegalovirus (CMV) is an FDA-cleared quantitative test that  enables the detection and quantitation of CMV DNA in EDTA plasma of  infected transplant patients on the nydia 8800 system. This test was  verified by [employer]. Results should be interpreted  with consideration of all clinical findings and laboratory findings and  should not form the sole basis for a diagnosis or treatment decision. Uvw20QE/mL (12-23 @ 06:15)        RADIOLOGY:    <The imaging below has been reviewed and visualized by me independently. Findings as detailed in report below> Follow Up:  Fever    Interval History: no further fevers. no other acute events today.     REVIEW OF SYSTEMS  [ x ] ROS unobtainable because:  intubated  [  ] All other systems negative except as noted below    Constitutional:  [ ] fever [ ] chills  [ ] weight loss  [ ] weakness  Skin:  [ ] rash [ ] phlebitis	  Eyes: [ ] icterus [ ] pain  [ ] discharge	  ENMT: [ ] sore throat  [ ] thrush [ ] ulcers [ ] exudates  Respiratory: [ ] dyspnea [ ] hemoptysis [ ] cough [ ] sputum	  Cardiovascular:  [ ] chest pain [ ] palpitations [ ] edema	  Gastrointestinal:  [ ] nausea [ ] vomiting [ ] diarrhea [ ] constipation [ ] pain	  Genitourinary:  [ ] dysuria [ ] frequency [ ] hematuria [ ] discharge [ ] flank pain  [ ] incontinence  Musculoskeletal:  [ ] myalgias [ ] arthralgias [ ] arthritis  [ ] back pain  Neurological:  [ ] headache [ ] seizures  [ ] confusion/altered mental status    Allergies  No Known Allergies        ANTIMICROBIALS:  caspofungin IVPB    caspofungin IVPB 50 every 24 hours  meropenem  IVPB 1000 every 12 hours  minocycline IVPB    minocycline IVPB 200 every 12 hours  trimethoprim / sulfamethoxazole IVPB 390 every 12 hours      OTHER MEDS:  MEDICATIONS  (STANDING):  albuterol/ipratropium for Nebulization 3 every 6 hours PRN  aspirin  chewable 81 daily  atorvastatin 80 at bedtime  buDESOnide    Inhalation Suspension 0.5 every 12 hours  cycloSPORINE  , modified (GENGRAF) Solution 25 <User Schedule>  fentaNYL   Patch  12 MICROgram(s)/Hr. 1 every 48 hours  heparin   Injectable 5000 every 12 hours  insulin lispro (ADMELOG) corrective regimen sliding scale  every 6 hours  methylPREDNISolone sodium succinate Injectable 32 <User Schedule>  midodrine 10 every 8 hours  norepinephrine Infusion 0.05 <Continuous>  pantoprazole  Injectable 40 every 12 hours  vasopressin Infusion 0.03 <Continuous>      Vital Signs Last 24 Hrs  T(C): 37.7 (24 Dec 2024 15:00), Max: 37.7 (24 Dec 2024 15:00)  T(F): 99.9 (24 Dec 2024 15:00), Max: 99.9 (24 Dec 2024 15:00)  HR: 105 (24 Dec 2024 16:30) (98 - 128)  BP: 123/47 (24 Dec 2024 16:30) (51/16 - 191/74)  BP(mean): 68 (24 Dec 2024 16:30) (23 - 113)  RR: 27 (24 Dec 2024 16:30) (16 - 47)  SpO2: 100% (24 Dec 2024 16:30) (92% - 100%)    Parameters below as of 24 Dec 2024 15:00  Patient On (Oxygen Delivery Method): ventilator    O2 Concentration (%): 30    PHYSICAL EXAMINATION:  General: Intubated and Sedated  HEENT: +ETT  Neck: Supple  Cardiac: RRR, No M/R/G  Resp: CTAB, No Wh/Rh/Ra  Abdomen: NBS, NT/ND, No HSM, No rigidity or guarding  MSK: No LE edema. No Calf tenderness  : +testicular edema with improved erythema compared to week prior  Skin: No rashes or lesions. Skin is warm and dry to the touch.   Neuro: Intubated and Sedated  Psych: Unable to assess - intubated and sedated                        9.2    11.76 )-----------( 38       ( 24 Dec 2024 13:57 )             25.9       12-24    134[L]  |  101  |  67[H]  ----------------------------<  111[H]  4.1   |  18[L]  |  1.32[H]    Ca    7.8[L]      24 Dec 2024 13:57  Phos  3.9     12-24  Mg     2.3     12-24    TPro  4.3[L]  /  Alb  2.9[L]  /  TBili  10.2[H]  /  DBili  x   /  AST  196[H]  /  ALT  95[H]  /  AlkPhos  144[H]  12-24      Urinalysis Basic - ( 24 Dec 2024 13:57 )    Color: x / Appearance: x / SG: x / pH: x  Gluc: 111 mg/dL / Ketone: x  / Bili: x / Urobili: x   Blood: x / Protein: x / Nitrite: x   Leuk Esterase: x / RBC: x / WBC x   Sq Epi: x / Non Sq Epi: x / Bacteria: x        MICROBIOLOGY:  Vancomycin Level, Random: 16.6 ug/mL (12-24-24 @ 06:25)  v  Combi-Cath  12-23-24   No growth to date  --    No polymorphonuclear cells seen per low power field  No squamous epithelial cells per low power field  No organisms seen per oil power field      Abdominal Fl  12-20-24   Rare Enterococcus faecalis  --  Enterococcus faecalis      Trach Asp Tracheal Aspirate  12-19-24   Moderate Stenotrophomonas maltophilia  Commensal charity consistent with body site  --  Stenotrophomonas maltophilia      .Blood BLOOD  12-19-24   No growth at 5 days  --  --      Catheterized Catheterized  12-16-24   10,000 - 49,000 CFU/mL Enterococcus faecalis  --  Enterococcus faecalis      .Blood BLOOD  12-16-24   No growth at 5 days  --  --      Combi-Cath  12-16-24   Commensal charity consistent with body site  --    No polymorphonuclear leukocytes seen per low power field  No Squamous epithelial cells seen per low power field  No organisms seen per oil power field      .Blood BLOOD  12-16-24   No growth at 5 days  --  --      Combi-Cath  12-14-24   Commensal charity consistent with body site  --    Numerous polymorphonuclear leukocytes per low power field  No Squamous epithelial cells per low power field  No organisms seen per oil power field      .Other  12-12-24   No fungus isolated at 1 week.  --  --      .Blood BLOOD  12-07-24   No growth at 5 days  --  --      Body Fluid  12-07-24   No growth at 5 days  --    polymorphonuclear leukocytes seen  No organisms seen  by cytocentrifuge      .Blood BLOOD  12-06-24   Growth in aerobic and anaerobic bottles: Escherichia coli  See previous culture [id number]  --    Growth in aerobic bottle: Gram Negative Rods  Growth in anaerobic bottle: Gram Negative Rods      .Blood BLOOD  12-06-24   Growth in aerobic and anaerobic bottles: Escherichia coli  Direct identification is available within approximately 3-5  hours either by Blood Panel Multiplexed PCR or Direct  MALDI-TOF. Details: [email or web address]  --  Blood Culture PCR  Escherichia coli      .Blood BLOOD  12-05-24   No growth at 5 days  --  --      .Blood BLOOD  12-05-24   No growth at 5 days  --  --      .Stool  12-01-24   No enteric pathogens isolated.  (Stool culture examined for Salmonella,  Shigella, Campylobacter, Aeromonas, Plesiomonas,  Vibrio, E.coli O157 and Yersinia)  --  --      .Blood BLOOD  11-26-24   No growth at 5 days  --  --      .Blood BLOOD  11-26-24   No growth at 5 days  --  --      Bronchial  11-24-24   Few Candida glabrata  --  Candida (Torulopsis) glabrata        HIV-1 RNA Quantitative, Viral Load Log: NOT DET. lg /mL (12-18-24 @ 22:14)  Toxoplasma IgG Screen: 78.00 IU/mL (11-15-24 @ 00:41)  CMV IgG Antibody: 1.50 U/mL (11-15-24 @ 00:41)    CMVPCR Log: Det <1.54 Assay Dynamic Range: 34.5 to 1.0E+07 IU/mL (1.54 to 7.00 Log10 IU/mL)  Assay lower limit of quantification (LLOQ) is 34.5 IU/mL (1.54 Log10  IU/mL)  CMV DNA detected below the LLOQ will be reported as Detected < 34.5 IU/mL  (<1.54 Log10 IU/mL)  Nydia Cytomegalovirus (CMV) is an FDA-cleared quantitative test that  enables the detection and quantitation of CMV DNA in EDTA plasma of  infected transplant patients on the nydia 8800 system. This test was  verified by [facility]. Results should be interpreted  with consideration of all clinical findings and laboratory findings and  should not form the sole basis for a diagnosis or treatment decision. Vxu28GI/mL (12-23 @ 06:15)        RADIOLOGY:    <The imaging below has been reviewed and visualized by me independently. Findings as detailed in report below>    < from: US Testicles (12.23.24 @ 22:17) >  IMPRESSION:  No appreciable arterial flow with very limited venous flow in in the   testes bilaterally.  Interval decrease in diffuse scrotal edema.  Small bilateral hydroceles.    < end of copied text >

## 2024-12-24 NOTE — PHARMACOTHERAPY INTERVENTION NOTE - COMMENTS
STERLING BRYANT, 74y Male      Creatinine: 2.00 mg/dL (12-24-24 @ 04:48)  Creatinine: 2.60 mg/dL (12-23-24 @ 22:27)  Creatinine: 2.56 mg/dL (12-23-24 @ 18:43)    MEDICATIONS  (STANDING):  albuterol/ipratropium for Nebulization 3 milliLiter(s) Nebulizer every 6 hours  aspirin  chewable 81 milliGRAM(s) Oral daily  atorvastatin 80 milliGRAM(s) Oral at bedtime  buDESOnide    Inhalation Suspension 0.5 milliGRAM(s) Inhalation every 12 hours  caspofungin IVPB 50 milliGRAM(s) IV Intermittent every 24 hours  caspofungin IVPB      chlorhexidine 0.12% Liquid 15 milliLiter(s) Oral Mucosa every 12 hours  chlorhexidine 2% Cloths 1 Application(s) Topical <User Schedule>  CRRT Treatment    <Continuous>  cycloSPORINE  , modified (GENGRAF) Solution 25 milliGRAM(s) Oral <User Schedule>  fentaNYL   Patch  12 MICROgram(s)/Hr. 1 Patch Transdermal every 48 hours  heparin   Injectable 5000 Unit(s) SubCutaneous every 12 hours  insulin lispro (ADMELOG) corrective regimen sliding scale   SubCutaneous every 6 hours  meropenem  IVPB 1000 milliGRAM(s) IV Intermittent every 12 hours  methylPREDNISolone sodium succinate Injectable 32 milliGRAM(s) IV Push <User Schedule>  minocycline IVPB      minocycline IVPB 200 milliGRAM(s) IV Intermittent every 12 hours  mupirocin 2% Ointment 1 Application(s) Topical every 12 hours  norepinephrine Infusion 0.05 MICROgram(s)/kG/Min (8.78 mL/Hr) IV Continuous <Continuous>  nystatin Powder 1 Application(s) Topical every 12 hours  pantoprazole  Injectable 40 milliGRAM(s) IV Push every 12 hours  PrismaSATE Dialysate BK 0 / 3.5 5000 milliLiter(s) (1500 mL/Hr) CRRT <Continuous>  PrismaSOL Filtration BGK 4 / 2.5 5000 milliLiter(s) (1250 mL/Hr) CRRT <Continuous>  PrismaSOL Filtration BGK 4 / 2.5 5000 milliLiter(s) (250 mL/Hr) CRRT <Continuous>  trimethoprim / sulfamethoxazole IVPB 190 milliGRAM(s) IV Intermittent every 24 hours  vasopressin Infusion 0.03 Unit(s)/Min (4.5 mL/Hr) IV Continuous <Continuous>    MEDICATIONS  (PRN):             Recommendation(s):  1)    Frida Aviles, PharmD, BCCCP  Clinical Pharmacist, Critical Care  Available via [employer] Teams       STERLING BRYANT, 74y Male      Creatinine: 2.00 mg/dL (12-24-24 @ 04:48)  Creatinine: 2.60 mg/dL (12-23-24 @ 22:27)  Creatinine: 2.56 mg/dL (12-23-24 @ 18:43)    12/16/24 Urine cath grew E. faecalis <50,000  12/19/24 Trach asp grew stenotrophomonas maltophilia (S- levofloxacin, trim/sulf, minocycline)  12/20/24 abdominal fluid grew E. faecalis (S- ampicillin, vancomycin)  12/23/24 Combi-cath NGTD    MEDICATIONS  (STANDING):  albuterol/ipratropium for Nebulization 3 milliLiter(s) Nebulizer every 6 hours  aspirin  chewable 81 milliGRAM(s) Oral daily  atorvastatin 80 milliGRAM(s) Oral at bedtime  buDESOnide    Inhalation Suspension 0.5 milliGRAM(s) Inhalation every 12 hours  caspofungin IVPB 50 milliGRAM(s) IV Intermittent every 24 hours  CRRT Treatment    <Continuous>  cycloSPORINE  , modified (GENGRAF) Solution 25 milliGRAM(s) Oral <User Schedule>  fentaNYL   Patch  12 MICROgram(s)/Hr. 1 Patch Transdermal every 48 hours  heparin   Injectable 5000 Unit(s) SubCutaneous every 12 hours  insulin lispro (ADMELOG) corrective regimen sliding scale   SubCutaneous every 6 hours  meropenem  IVPB 1000 milliGRAM(s) IV Intermittent every 12 hours  methylPREDNISolone sodium succinate Injectable 32 milliGRAM(s) IV Push <User Schedule>  minocycline IVPB 200 milliGRAM(s) IV Intermittent every 12 hours  mupirocin 2% Ointment 1 Application(s) Topical every 12 hours  norepinephrine Infusion 0.05 MICROgram(s)/kG/Min (8.78 mL/Hr) IV Continuous <Continuous>  nystatin Powder 1 Application(s) Topical every 12 hours  pantoprazole  Injectable 40 milliGRAM(s) IV Push every 12 hours  PrismaSATE Dialysate BK 0 / 3.5 5000 milliLiter(s) (1500 mL/Hr) CRRT <Continuous>  PrismaSOL Filtration BGK 4 / 2.5 5000 milliLiter(s) (1250 mL/Hr) CRRT <Continuous>  PrismaSOL Filtration BGK 4 / 2.5 5000 milliLiter(s) (250 mL/Hr) CRRT <Continuous>  trimethoprim / sulfamethoxazole IVPB 190 milliGRAM(s) IV Intermittent every 24 hours  vasopressin Infusion 0.03 Unit(s)/Min (4.5 mL/Hr) IV Continuous <Continuous>     Recommendation(s):  1) Increase trimethoprim/sulfamethoxazole from 190mg IV q24h to 390mg IV q12h while the patient is on CRRT    S/W SICU team    RegardsFrida, PharmD, BCCCP  Clinical Pharmacist, Critical Care  Available via [employer] Teams

## 2024-12-24 NOTE — CONSULT NOTE ADULT - ASSESSMENT
Patient with prolonged hospital course. In brief, he is a 75 y/o male with PMHx of non-obstructive CAD (mid-RCA ~50%, mid-LCx ~50-60%), HTN, DM, paroxysmal Afib s/p ablation (not on AC due to ?thrombocytopenia), GONZALES cirrhosis who underwent a OLT on 11/15/24. Post-op course was complicated by ischemic bowel s/p total colectomy (12/7), septic shock 2/2 E.coli bacteremia, cardiogenic shock (briefly on dobutamine and IABP 12/7 - 12/10), multiple intubations (now on tracheostomy to vent), renal failure requiring CRRT. Noted with developing ST depressions and continually rising troponins on 12/24 for which cardiology are consulted.    #NSTEMI  - Patient is critically ill in multi-organ failure, on multiple pressors, and, per primary team concern for possible occult/opportunistic infection  - Noted with sinus tachycardia, increasing rates, developing STD in precordial leads  - Has previously been on heparin ggt this admission for presumed ACS/NSTEMI, was stopped due to acute drop in hgb  - Troponin trend noted as follows (i/s/o shock state and ESRD):  220 -> 256 -> 274 -> 321 -> 406 -> 443 -> 454 -> 478 -> 604 -> 653 -> 721 -> (12/24) 728 -> 851 -> 975  - Given continually uprising troponins, developing ST depressions in patient with known CAD would favor re-trial of anticoagulation for ACS treatment at this time. He is not medically stable for LHC +/- PCI and is unable to tolerate DAPT  - Severe thrombocytopenia presents a barrier to heparin/LMWH  - Can trial argatroban, initial: 0.5 mcg/kg/minute; adjust dose based on aPTT results (measured 2 hours after initiation then every 4 hours) until steady state aPTT is 1.5 to 3 times the initial baseline value, is achieved not exceeding 100 seconds  - Would target goal aPTT of 60 - 80 sec  - Would also trend CBCs alongside aPTTs. Hold AC for precipitous drop in H+H or signs of bleeding  - Once platelet count stabilizes would ideally switch to an alternative agent  - C/w ASA 81 mg QD. Hold if plt are < 30k. Can consider adding P2y12 if platelet function recovers/stabilizes  - C/w atorvastatin 80 mg  - Would re-introduce beta blocker (previously on metoprolol tartrate 12.5 mg q8) if BP allows     Patient with prolonged hospital course. In brief, he is a 75 y/o male with PMHx of non-obstructive CAD (mid-RCA ~50%, mid-LCx ~50-60%), HTN, DM, paroxysmal Afib s/p ablation (not on AC due to ?thrombocytopenia), GONZALES cirrhosis who underwent a OLT on 11/15/24. Post-op course was complicated by ischemic bowel s/p total colectomy (12/7), septic shock 2/2 E.coli bacteremia, cardiogenic shock (briefly on dobutamine and IABP 12/7 - 12/10), multiple intubations (now on tracheostomy to vent), renal failure requiring CRRT. Noted with developing ST depressions and continually rising troponins on 12/24 for which cardiology are consulted.    #NSTEMI  - Patient is critically ill in multi-organ failure, on multiple pressors, and, per primary team concern for possible occult/opportunistic infection  - Noted with sinus tachycardia, increasing rates, developing STD in precordial leads  - Has previously been on heparin ggt this admission for presumed ACS/NSTEMI, was stopped due to acute drop in hgb  - Troponin trend noted as follows (i/s/o shock state and ESRD):  220 -> 256 -> 274 -> 321 -> 406 -> 443 -> 454 -> 478 -> 604 -> 653 -> 721 -> (12/24) 728 -> 851 -> 975  - Given continually uprising troponins, developing ST depressions in patient with known CAD would favor re-trial of anticoagulation for ACS treatment at this time. He is not medically stable for LHC +/- PCI and is unable to tolerate DAPT  - Severe thrombocytopenia presents a barrier to heparin/LMWH  - Can trial argatroban, initial: 0.5 mcg/kg/minute; adjust dose based on aPTT results (measured 2 hours after initiation then every 4 hours) until steady state aPTT is 1.5 to 3 times the initial baseline value, is achieved not exceeding 100 seconds  - Would target goal aPTT of 60 - 80 sec  - Would also trend CBCs alongside aPTTs. Hold AC for precipitous drop in H+H or signs of bleeding  - Once platelet count stabilizes would ideally switch to an alternative agent  - C/w ASA 81 mg QD. Hold if plt are < 30k. Can consider adding P2y12 if platelet function recovers/stabilizes  - C/w atorvastatin 80 mg  - Would re-introduce beta blocker (previously on metoprolol tartrate 12.5 mg q8) if BP allows    This patient was seen and examined personally by me and the plan was discussed with the fellow and/or resident above. Amendments were made as necessary to the above. Agree with the excellent note and plan above. 74M w non-obs CAD, HTN, DM, pAF s/p ablation no AC d/t tcp, GONZALES cirrhosis s/p OLT 11/2024 - post op ischemic bowel, septic shock, cardiogenic shock, renal failure now with rising trop, TWI on ecg in SICU.  -tele  -trending trop 975-1200  -ecg ST STD precordial leads  -severe TCP limits heparin/lmwh -- can trial argatroban, rec heme consult close Hb watch  -cont asa 81 - close monitor platelets goal >30K  -cont statin  -restart metoprolol      55 minutes were spent on this encounter for extensive review of medical record details including labs and/or imaging studies and/or adjacent care team and consultant records, as well as review and reconciliation of current medications. Time was spent on obtaining a history, performing physical examination of patient, and answering patient and/or family questions regarding plan of care. Time was also spent discussing plan of care with patient’s other care team members including primary and consulting teams. Time also was spent on documentation of this encounter into the EHR.    Ludwig Vallejo MD, MPhil, [facility]  Cardiologist, [facility]  ; Chemo [facility] and [facility]  Email: [email or web address] on-service contact/call information, go to [email or web address] and use "[email or web address]" to login.  Outpatient Cardiology appointments, call [phone] to arrange with a colleague; I do not have outpatient Cardiology clinic.

## 2024-12-24 NOTE — PROGRESS NOTE ADULT - SUBJECTIVE AND OBJECTIVE BOX
Subjective  Patient not participatory in exam.     Objective    Vital signs  T(F): , Max: 101.7 (12-23-24 @ 13:00)  HR: 118 (12-24-24 @ 11:00)  BP: 118/62 (12-24-24 @ 11:00)  SpO2: 100% (12-24-24 @ 11:00)  Wt(kg): --    Output     OUT:    Bulb (mL): 25 mL    Bulb (mL): 190 mL    Indwelling Catheter - Urethral (mL): 165 mL    VAC (Vacuum Assisted Closure) System (mL): 0 mL  Total OUT: 380 mL    Total NET: -380 mL      OUT:    Indwelling Catheter - Urethral (mL): 0 mL  Total OUT: 0 mL    Total NET: 0 mL          Gen: NAD  : : Castro catheter in place draining clear brown-orange colored urine, powdered substance overlaying penis, scrotum, and pelvic area. Scrotum less edematous, less red, with layer of brown layer of film that sloughs and bleeds superficially that is cold to the touch. Similar film to inner right thigh. Underside of scrotum skin-colored and warm to touch. No crepitus appreciated over scrotal skin. Bilateral testicles in normal orthotopic lie and soft to touch.    Labs      12-24 @ 04:48    WBC --    / Hct --    / SCr 2.00     12-24 @ 02:19    WBC 8.73  / Hct 27.3  / SCr --           Culture - Bronchial (collected 12-23-24 @ 16:37)  Source: Combi-Cath  Gram Stain (12-23-24 @ 22:50):    No polymorphonuclear cells seen per low power field    No squamous epithelial cells per low power field    No organisms seen per oil power field    Culture - Body Fluid with Gram Stain (collected 12-20-24 @ 18:23)  Source: Abdominal Fl  Gram Stain (12-21-24 @ 05:56):    polymorphonuclear leukocytes seen    Gram positive cocci in pairs seen    by cytocentrifuge  Preliminary Report (12-21-24 @ 21:12):    Rare Enterococcus faecalis  Organism: Enterococcus faecalis (12-22-24 @ 19:26)  Organism: Enterococcus faecalis (12-22-24 @ 19:26)      Method Type: ESAU      -  Ampicillin: S <=2 Predicts results to ampicillin/sulbactam, amoxacillin-clavulanate and  piperacillin-tazobactam.      -  Vancomycin: S 1    Culture - Sputum (collected 12-19-24 @ 14:31)  Source: Trach Asp Tracheal Aspirate  Gram Stain (12-20-24 @ 00:18):    Moderate polymorphonuclear leukocytes per low power field    Rare Squamous epithelial cells per low power field    Numerous Gram Negative Rods per oil power field    Numerous Yeast like cells per oil power field  Final Report (12-21-24 @ 14:57):    Moderate Stenotrophomonas maltophilia    Commensal charity consistent with body site  Organism: Stenotrophomonas maltophilia (12-21-24 @ 14:57)  Organism: Stenotrophomonas maltophilia (12-21-24 @ 14:57)      Method Type: KB      -  Minocycline: S  Organism: Stenotrophomonas maltophilia (12-21-24 @ 14:57)      Method Type: ESAU      -  Levofloxacin: S <=0.5      -  Trimethoprim/Sulfamethoxazole: S <=0.5/9.5    Culture - Blood (collected 12-19-24 @ 03:28)  Source: .Blood BLOOD  Final Report (12-24-24 @ 07:00):    No growth at 5 days    Culture - Blood (collected 12-19-24 @ 03:28)  Source: .Blood BLOOD  Final Report (12-24-24 @ 07:00):    No growth at 5 days        Urine Cx: ?  Blood Cx: ?    Imaging

## 2024-12-24 NOTE — PROGRESS NOTE ADULT - ASSESSMENT
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 Patient's post-op course has been c/b multiple reintubation, required total colectomy w/ ileostomy, and IABP placement 2/2 cardiogenic shock with subsequent removal.     Neuro:  - Opens eyes intermittently, Not following commands, off sedation  - pain control w/ oxycodone  - CT head 11/19, 11/21, 11/25, 11/29, 12/5 negative  - EEG: Mild diffuse cerebral dysfunction that is not specific in etiology. No epileptic discharges recorded. No seizures recorded.  - Holding home xanax, Abilify, Zoloft, Remeron, Lexapro  - CT head 12/20 showing age-indeterminate infarct, f/u Neurology recs  - No need for MRI     Resp:  -S/p bedside tracheostomy 12/17  -PRVC 14/500/5/30  -SBT daily  -c/w inhaled bronchodilator  -VBG daily    CV:  -IABP removed 12/10  -s/p dobutamine and amiodarone gtt   -home metoprolol 12.5 q8hr held for pressor req  -trend lactate 2.7 (12/18)  -TTE 12/11 shows EF of 55-60%  - +/- levo  - trending troponin  - levo/vaso    GI:  -trend LFTs  -NPO w/ tube feeds, KO tube post pyloric  -protonix QD  -high output from ALYSSA drains > repletions ordered    /Renal:  -HD (12/20, 12/21) via R IJ shiley   -Monitor BUN/Cr, I&Os, trend UOP  -Scrotal US 12/15 -> edema, no abscess -> elevate  - rpt scrotal doppler for concern of ischemia > low flow state, low concern for abscess  -nichols    Heme:  -trend H/H  -monitor coags  -trend platelets  - 1 PRBC    ID:  -ABx transition to jeniffer, caspo (12/16-), bactrim, minocycline  - Tracheal aspirate -> Stenotrophomas maltophilia  -U Cx 12/16 positive  -Combi cath 12/19 positive  -monitor WBC, fever curve  -CMV PPx w/ ganciclovir (holding now iso thrombocytopenia)  -holding cellcept, cyclosporine      Endo:  -monitor glucose   -methylprednisone 32 qd  -NPH 18U Q6, ISS    Lines:   -KO tube post pyloric  -nichols exchanged 12/16  -ALYSSA x 2   -R STEPHAN Wright PA-C  Surgical Intensive Care Unit  [id number]

## 2024-12-24 NOTE — PROGRESS NOTE ADULT - NS ATTEND AMEND GEN_ALL_CORE FT
74M w/ PMHx of HTN, DM, AF, BPH, MASH cirrhosis and 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 Patient's post-op course has been c/b multiple reintubation, required total colectomy w/ ileostomy, and IABP placement 2/2 cardiogenic shock with subsequent removal.   S/P prolonged intubation, s/p trached and PEG    Poor neuro exam, intermittently appears to be more awake  Failed SBT, on full vent support, FiO2 remains on 30%, CXR unchanged  Fluctuating vasopressor requirement, remains in low grade septic shock, pt volume responsive, also on septic dose  Vasopressin, add Midodrine  Ti continues to increase, demand ischemia as per cardiology, on ASA  CT C/A/P with contrast no bowel ischemia, some collection in pelvis, small rt LL infiltrate minimal effusion atelectasis.  Abx Christopher, Minocycline, Bactrim, Caspo, resp culture Stenotrophomonas  Non urgent CT of scrotam  Tolerating tube feed at goal, LFT remains high  HD schedule    ISS/Lantus dose adjustment  Pharm DVT ppx, received one U PRBC with appropriate response

## 2024-12-24 NOTE — PROGRESS NOTE ADULT - ASSESSMENT
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 Post-op course c/b worsening mental status and acute hypoxic respiratory failure requiring multiple intubations/extubations, no longer intubated, now s/p tracheostomy (as of 12/23/2024) and cardiogenic shock s/p Percutaneous insertion of an intra-aortic balloon pump and septic shock/colonic ischemia s/p total abdominal colectomy with ileostomy on 12/7/24, s/p ex-lap w/ileostomy on 12/9/24. Urology initially consulted 12/9-12/16 for scrotal edema w/ large skin breakdown. Scrotum at that time was larger, more red edematous, swollen with skin breakdown, no crepitus felt. Scrotal/testicular ultrasounds were performed at that time, all showing scrotal edema, no necrotizing infection/abscess. Urology signed off, recommending scrotal support, wound care, no surgical intervention indicated at that time.     Urology was recalled today 12/23 for worsening of scrotal skin sloughing. Per primary team, edema and scrotal swelling has improved. However, there is a layer of brown film overlaying the ventral area of the scrotum encompassing both the ventral layer of skin of the left and right scrotum. The brown film sloughs and bleed superficially when irritated, it is cold to the touch. There is an area of similar film overlaying a section of the right inner thigh. There is clear delineation from the brown layer of film and normal skin-colored scrotum. The normal colored scrotal skin is warm to touch.  No appreciable flow to testicles shown on ultrasound likely also 2/2 to prolonged pressor requirements decreasing blood flow to testicles and should resolve with improved peripheral perfusion. On examination testicles in normal lie and are soft, no role for orchiopexy for testicles.    Recs  - No appreciable flow to testicles shown on ultrasound likely also 2/2 to prolonged pressor requirements decreasing blood flow to testicles and should resolve with improved peripheral perfusion. On examination testicles in normal lie and are soft, no role for orchiopexy for testicles.  - No urologic surgical intervention indicated at this time -> patients superficial skin overlying scrotum will likely slough off with time and underlying tissue appears to be healthy.   - Wet to dry dressing to aid in debridement of tissue   - CT A/P scan down to level of knees tomorrow AM to assess level of tissue involvement   - Continue with antibiotics   - Scrotal support  - Continue with wound care  - Urology to follow     Discussed with Dr. Guerrero     [facility]  [address] [place], [place] [id number] [phone]

## 2024-12-24 NOTE — PROGRESS NOTE ADULT - SUBJECTIVE AND OBJECTIVE BOX
[facility] DIVISION OF KIDNEY DISEASES AND HYPERTENSION -- FOLLOW UP NOTE  --------------------------------------------------------------------------------  Chief Complaint:    24 hour events/subjective:  Patient was seen and examined at bedside      PAST HISTORY  --------------------------------------------------------------------------------  No significant changes to PMH, PSH, FHx, SHx, unless otherwise noted    ALLERGIES & MEDICATIONS  --------------------------------------------------------------------------------  Allergies    No Known Allergies    Intolerances      Standing Inpatient Medications  aspirin  chewable 81 milliGRAM(s) Oral daily  atorvastatin 80 milliGRAM(s) Oral at bedtime  buDESOnide    Inhalation Suspension 0.5 milliGRAM(s) Inhalation every 12 hours  caspofungin IVPB 50 milliGRAM(s) IV Intermittent every 24 hours  caspofungin IVPB      chlorhexidine 0.12% Liquid 15 milliLiter(s) Oral Mucosa every 12 hours  chlorhexidine 2% Cloths 1 Application(s) Topical <User Schedule>  CRRT Treatment    <Continuous>  cycloSPORINE  , modified (GENGRAF) Solution 25 milliGRAM(s) Oral <User Schedule>  fentaNYL   Patch  12 MICROgram(s)/Hr. 1 Patch Transdermal every 48 hours  heparin   Injectable 5000 Unit(s) SubCutaneous every 12 hours  insulin lispro (ADMELOG) corrective regimen sliding scale   SubCutaneous every 6 hours  meropenem  IVPB 1000 milliGRAM(s) IV Intermittent every 12 hours  methylPREDNISolone sodium succinate Injectable 32 milliGRAM(s) IV Push <User Schedule>  midodrine 10 milliGRAM(s) Oral every 8 hours  minocycline IVPB      minocycline IVPB 200 milliGRAM(s) IV Intermittent every 12 hours  mupirocin 2% Ointment 1 Application(s) Topical every 12 hours  norepinephrine Infusion 0.05 MICROgram(s)/kG/Min IV Continuous <Continuous>  nystatin Powder 1 Application(s) Topical every 12 hours  pantoprazole  Injectable 40 milliGRAM(s) IV Push every 12 hours  PrismaSATE Dialysate BK 0 / 3.5 5000 milliLiter(s) CRRT <Continuous>  PrismaSOL Filtration BGK 4 / 2.5 5000 milliLiter(s) CRRT <Continuous>  PrismaSOL Filtration BGK 4 / 2.5 5000 milliLiter(s) CRRT <Continuous>  trimethoprim / sulfamethoxazole IVPB 390 milliGRAM(s) IV Intermittent every 12 hours  vasopressin Infusion 0.03 Unit(s)/Min IV Continuous <Continuous>    PRN Inpatient Medications  albuterol/ipratropium for Nebulization 3 milliLiter(s) Nebulizer every 6 hours PRN      REVIEW OF SYSTEMS- unable   ----------------------------------------------------------------    VITALS/PHYSICAL EXAM  --------------------------------------------------------------------------------  T(C): 37.6 (12-24-24 @ 11:00), Max: 38.7 (12-23-24 @ 13:00)  HR: 114 (12-24-24 @ 11:30) (98 - 143)  BP: 105/53 (12-24-24 @ 11:15) (51/16 - 191/74)  RR: 18 (12-24-24 @ 11:30) (18 - 47)  SpO2: 100% (12-24-24 @ 11:30) (92% - 100%)  Wt(kg): --        12-23-24 @ 07:01  -  12-24-24 @ 07:00  --------------------------------------------------------  IN: 2732.1 mL / OUT: 1836 mL / NET: 896.1 mL    12-24-24 @ 07:01  -  12-24-24 @ 12:12  --------------------------------------------------------  IN: 520.6 mL / OUT: 338 mL / NET: 182.6 mL      Physical Exam:  	Gen:  critically ill   	HEENT: PERRL, supple neck, clear oropharynx  	Pulm: CTA B/L  	CV: RRR, S1S2; no rub  	Abd: +BS, soft, nontender/nondistended                      Transplant: No tenderness, swelling  	: No suprapubic tenderness  	UE: Warm, FROM; no edema; no asterixis  	LE: Warm, FROM; no edema  	Skin: Warm, without rashes      LABS/STUDIES  --------------------------------------------------------------------------------              9.3    8.73  >-----------<  36       [12-24-24 @ 02:19]              27.3     132  |  98  |  94  ----------------------------<  110      [12-24-24 @ 04:48]  4.7   |  17  |  2.00        Ca     7.6     [12-24-24 @ 04:48]      Mg     2.4     [12-24-24 @ 04:48]      Phos  5.3     [12-24-24 @ 04:48]    TPro  3.9  /  Alb  2.7  /  TBili  8.9  /  DBili  x   /  AST  197  /  ALT  89  /  AlkPhos  124  [12-24-24 @ 04:48]    PT/INR: PT 15.5 , INR 1.36       [12-24-24 @ 06:45]  PTT: 44.3       [12-24-24 @ 06:45]      Creatinine Trend:  SCr 2.00 [12-24 @ 04:48]  SCr 2.60 [12-23 @ 22:27]  SCr 2.56 [12-23 @ 18:43]  SCr 2.47 [12-23 @ 12:32]  SCr 2.31 [12-23 @ 00:26]              Urinalysis - [12-24-24 @ 04:48]      Color  / Appearance  / SG  / pH       Gluc 110 / Ketone   / Bili  / Urobili        Blood  / Protein  / Leuk Est  / Nitrite       RBC  / WBC  / Hyaline  / Gran  / Sq Epi  / Non Sq Epi  / Bacteria       Vitamin D (25OH) <6.0      [11-21-24 @ 08:32]  TSH 0.68      [12-12-24 @ 12:27]  Lipid: chol 114, , HDL 47, LDL --      [04-24-24 @ 06:48]

## 2024-12-24 NOTE — PROGRESS NOTE ADULT - ASSESSMENT
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   s/p OLT 11/15 with complicated post op course    [] Septic shock/Cardiogenic shock  [] s/p Colectomy 12/7   [] RTOR for closure and Ileostomy creation   [] IAPB 12/7- removed 12/10:   - E coli Bacteremia (12/6) - on jeniffer/caspo,  Received Tobra x 1 12/6 .   - EC faecalis asc fluid ( 12/20)  - Ec faecalis UTI (12/16)  - Tx ID following - c/w Jeniffer/Caspo/Minocycline, vanco by level, bactrim IV   - Neutropenia: received Neupogen 480mcg x2  - MORAIMA: CRRT started 12/7, stopped 12/15, resumed 12/23 due to pressor requirements  - AMS; CT Head neg  - Hold diuretics   - fu rpt cultures 12/23  - CT chest/Abd/pelvis: GGO, spleen infarcts, ileus  - S/P tracheotomy 12/17    [] s/p OLT POD #39  - trend LFT's  - Diet: increase TFs as needed  - Pain management: avoid narcotics  - Strict I&Os, nichols, wound vac placed 12/10   - US: L brachial DVT   - wound vac exchange for ileostomy (12/13)  - HIT panel neg (12/14)    [ ] Immunosuppression  - Tacrolimus stopped 11/18 in setting of AMS/Seizure, Started Cyclo 12/17  - Cyclo by level, MMF HELD, Solumedrol 32 mg daily  - PPx: Gancyclovir (HELD) in setting of thrombocytopenia; repeat CMV PCR pending     [] lleus   -@ home on Linzess  - imaging with distended colon (likely opioid induced)  - s/p Neostigmine x 2  - s/p Relistor, last dose 12/1  - s/p colectomy with end ileostomy  (see above)  - Daily AXR     [] CMV viremia  - d/c gancylovir due to thrombocytopenia  - CMV PCR (12/11 and 12/16): neg, repeat CMV pending from 12/24    [ ] AMS  - Reintubated 11/20 Aspiration/hypoxia, extubated 11/24->[id number]/24--> extubated 11/26 -> [id number]/7 -> tracheostomy 12/17  - EEG 11/30 negative, Neurology following  - BH following   - off tacro  - on keppra  -CT Head No Cont (12/20): Age-indeterminate posterior left frontal lobe infarct.     [ ] HTN/ pAFib  [ ] coronary vasospasm vs posterior wall MI  - Heparin gtt 12/19-21  - ASA  - Cards- plan for PCI prior to DC   - Off Amiodarone, off dobutamine  - c/w metoprolol  - Eliquis 5mg bid - held 12/6.   - Dr. 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   s/p OLT 11/15 with complicated post op course    [] Septic shock/Cardiogenic shock  [] s/p Colectomy 12/7   [] RTOR for closure and Ileostomy creation   [] IAPB 12/7- removed 12/10:   - E coli Bacteremia (12/6) - on jeniffer/caspo,  Received Tobra x 1 12/6 .   - EC faecalis asc fluid ( 12/20)  - Ec faecalis UTI (12/16)  - Tx ID following - c/w Jeniffer/Caspo/Minocycline, vanco by level, bactrim IV   - Neutropenia: received Neupogen 480mcg x2  - MORIAMA: CRRT started 12/7, stopped 12/15, resumed 12/23 due to pressor requirements  - AMS; CT Head neg  - Hold diuretics   - fu rpt cultures 12/23  - CT chest/Abd/pelvis: GGO, spleen infarcts, ileus  - S/P tracheotomy 12/17    [] s/p OLT POD #39  - trend LFT's  - Diet: increase TFs as needed  - Pain management: avoid narcotics  - Strict I&Os, nichols, wound vac placed 12/10   - US: L brachial DVT   - wound vac exchange for ileostomy (12/13)  - HIT panel neg (12/14)    [ ] Immunosuppression  - Tacrolimus stopped 11/18 in setting of AMS/Seizure, Started Cyclo 12/17  - Cyclo by level, MMF HELD, Solumedrol 32 mg daily  - PPx: Gancyclovir (HELD) in setting of thrombocytopenia; repeat CMV PCR pending     [] lleus   -@ home on Linzess  - imaging with distended colon (likely opioid induced)  - s/p Neostigmine x 2  - s/p Relistor, last dose 12/1  - s/p colectomy with end ileostomy  (see above)  - Daily AXR     [] CMV viremia  - d/c gancylovir due to thrombocytopenia  - CMV PCR (12/11 and 12/16): neg, repeat CMV pending from 12/24    [ ] AMS  - Reintubated 11/20 Aspiration/hypoxia, extubated 11/24->kfxrpzubffl26/24--> extubated 11/26 -> ixprzflavdz83/7 -> tracheostomy 12/17  - EEG 11/30 negative, Neurology following  - BH following   - off tacro  - on keppra  -CT Head No Cont (12/20): Age-indeterminate posterior left frontal lobe infarct.     [ ] HTN/ pAFib  [ ] coronary vasospasm vs posterior wall MI  - Heparin gtt 12/19-21  - ASA  - Cards- plan for PCI prior to DC   - Off Amiodarone, off dobutamine  - c/w metoprolol  - Eliquis 5mg bid - held 12/6.   - Dr. Quintanilla following    [ ] DM  - ISS     [] Scrotal abscess   - US (12/12): 2.1 x0.9 x 3.2 cm focal, right testicle- possible abscess (12/12)  - Urology reccs: CT scrotum  - 12/23US doppler scrotum: no arterial flow, limited venous flow, bl hydrocele  - 12/15 CT CAP no acute changes

## 2024-12-24 NOTE — PROGRESS NOTE ADULT - SUBJECTIVE AND OBJECTIVE BOX
24 HOUR EVENTS:  - 500 albumin, 250 crystalloid  - 1 PRBC  - hypoglycemic  - Bcx  - scrotal US for concern of ischemia    NEURO  RASS (if intubated): 		CAM ICU (if concern for delirium):  Exam: AOx0  Meds: [id number]   Patch  12 MICROgram(s)/Hr. 1 Patch Transdermal every 48 hours      RESPIRATORY  RR: 26 (12-24-24 @ 00:00) (18 - 57)  SpO2: 100% (12-24-24 @ 00:23) (95% - 100%)  Wt(kg): --  Exam: Lungs CTA b/l  Mechanical Ventilation: Mode: AC/ CMV (Assist Control/ Continuous Mandatory Ventilation), RR (machine): 14, RR (patient): 28, TV (machine): 500, FiO2: 30, PEEP: 5, ITime: 1, MAP: 11, PIP: 20    Meds: albuterol/ipratropium for Nebulization 3 milliLiter(s) Nebulizer every 6 hours  buDESOnide    Inhalation Suspension 0.5 milliGRAM(s) Inhalation every 12 hours      CARDIOVASCULAR  HR: 108 (12-24-24 @ 00:23) (98 - 143)  BP: 111/53 (12-24-24 @ 00:00) (59/39 - 191/74)  BP(mean): 76 (12-24-24 @ 00:00) (44 - 113)  ABP: --  ABP(mean): --  Wt(kg): --  CVP(cm H2O): --  VBG - ( 23 Dec 2024 22:20 )  pH: 7.31  /  pCO2: 34    /  pO2: 40    / HCO3: 17    / Base Excess: -8.4  /  SaO2: 69.6   Lactate: 2.2                Exam: Normal S1/S2 w/o murmurs or rubs  Cardiac Rhythm: sinus  Perfusion     [ x]Adequate   [ ]Inadequate  Mentation   [ ]Normal       [x ]Reduced  Extremities  [x ]Warm         [ ]Cool  Volume Status [ ]Hypervolemic [x ]Euvolemic [ ]Hypovolemic  Meds: norepinephrine Infusion 0.05 MICROgram(s)/kG/Min IV Continuous <Continuous>      GI/NUTRITION  Exam: abd non distended  Diet: tube feed  Last Bowel Movement: 22-Dec-2024 (12-23-24 @ 07:30)  Last Bowel Movement: 21-Dec-2024 (12-21-24 @ 07:00)  Last Bowel Movement: 21-Dec-2024 (12-20-24 @ 19:00)  Last Bowel Movement: 17-Dec-2024 (12-17-24 @ 19:00)  Last Bowel Movement: 16-Dec-2024 (12-16-24 @ 19:00)  Last Bowel Movement: 16-Dec-2024 (12-16-24 @ 07:00)  Last Bowel Movement: 15-Dec-2024 (12-15-24 @ 19:00)  Last Bowel Movement: 13-Dec-2024 (12-13-24 @ 12:00)  Last Bowel Movement: 12-Dec-2024 (12-12-24 @ 20:00)  Last Bowel Movement: 10-Dec-2024 (12-10-24 @ 20:00)  Last Bowel Movement: 10-Dec-2024 (12-10-24 @ 08:00)    Meds: pantoprazole  Injectable 40 milliGRAM(s) IV Push every 12 hours      GENITOURINARY  I&O's Detail    12-22 @ 07:01  -  12-23 @ 07:00  --------------------------------------------------------  IN:    Albumin 5%  - 250 mL: 500 mL    Enteral Tube Flush: 20 mL    IV PiggyBack: 850 mL    Nepro with Carb Steady: 1320 mL    Norepinephrine: 28 mL    Norepinephrine: 53.9 mL  Total IN: 2771.9 mL    OUT:    Bulb (mL): 0 mL    Bulb (mL): 350 mL    Ileostomy (mL): 525 mL    Indwelling Catheter - Urethral (mL): 165 mL    VAC (Vacuum Assisted Closure) System (mL): 0 mL  Total OUT: 1040 mL    Total NET: 1731.9 mL      12-23 @ 07:01  -  12-24 @ 00:28  --------------------------------------------------------  IN:    Albumin 5%  - 250 mL: 500 mL    Enteral Tube Flush: 80 mL    IV PiggyBack: 450 mL    Lactated Ringers Bolus: 250 mL    Nepro with Carb Steady: 170 mL    Norepinephrine: 186.1 mL    Vasopressin: 54 mL  Total IN: 1690.1 mL    OUT:    Ileostomy (mL): 200 mL    Indwelling Catheter - Urethral (mL): 145 mL    Other (mL): 0 mL    VAC (Vacuum Assisted Closure) System (mL): 0 mL  Total OUT: 345 mL    Total NET: 1345.1 mL          12-23    132[L]  |  97  |  111[H]  ----------------------------<  91  4.7   |  16[L]  |  2.60[H]    Ca    7.4[L]      23 Dec 2024 22:27  Phos  7.0     12-23  Mg     2.3     12-23    TPro  3.6[L]  /  Alb  2.4[L]  /  TBili  8.4[H]  /  DBili  x   /  AST  191[H]  /  ALT  84[H]  /  AlkPhos  124[H]  12-23    Meds:     HEMATOLOGIC  Meds: aspirin  chewable 81 milliGRAM(s) Oral daily  heparin   Injectable 5000 Unit(s) SubCutaneous every 12 hours                          7.8    8.03  )-----------( 40       ( 23 Dec 2024 22:27 )             22.9     PT/INR - ( 23 Dec 2024 13:23 )   PT: 15.4 sec;   INR: 1.34 ratio         PTT - ( 23 Dec 2024 13:23 )  PTT:42.4 sec    INFECTIOUS DISEASES  T(C): 36.7 (12-23-24 @ 19:00), Max: 38.7 (12-23-24 @ 13:00)  Wt(kg): --  WBC Count: 8.03 K/uL (12-23 @ 22:27)  WBC Count: 10.37 K/uL (12-23 @ 12:32)    Recent Cultures:  Specimen Source: Combi-Cath, 12-23 @ 16:37; Results --; Gram Stain:   No polymorphonuclear cells seen per low power field  No squamous epithelial cells per low power field  No organisms seen per oil power field; Organism: --  Specimen Source: Abdominal Fl, 12-20 @ 18:23; Results   Rare Enterococcus faecalis[!]; Gram Stain:   polymorphonuclear leukocytes seen  Gram positive cocci in pairs seen  by cytocentrifuge[!]; Organism: Enterococcus faecalis[!]  Specimen Source: Trach Asp Tracheal Aspirate, 12-19 @ 14:31; Results   Moderate Stenotrophomonas maltophilia  Commensal charity consistent with body site[!]; Gram Stain:   Moderate polymorphonuclear leukocytes per low power field  Rare Squamous epithelial cells per low power field  Numerous Gram Negative Rods per oil power field  Numerous Yeast like cells per oil power field[!]; Organism: Stenotrophomonas maltophilia[!]  Specimen Source: .Blood BLOOD, 12-19 @ 03:28; Results   No growth at 4 days; Gram Stain: --; Organism: --    Meds: caspofungin IVPB 50 milliGRAM(s) IV Intermittent every 24 hours  caspofungin IVPB      cycloSPORINE  , modified (GENGRAF) Solution 25 milliGRAM(s) Oral <User Schedule>  meropenem  IVPB 1000 milliGRAM(s) IV Intermittent every 12 hours  minocycline IVPB      minocycline IVPB 200 milliGRAM(s) IV Intermittent every 12 hours  trimethoprim / sulfamethoxazole IVPB 190 milliGRAM(s) IV Intermittent every 24 hours      ENDOCRINE  Capillary Blood Glucose    Meds: atorvastatin 80 milliGRAM(s) Oral at bedtime  insulin lispro (ADMELOG) corrective regimen sliding scale   SubCutaneous every 6 hours  insulin NPH human recombinant 14 Unit(s) SubCutaneous every 6 hours  methylPREDNISolone sodium succinate Injectable 32 milliGRAM(s) IV Push <User Schedule>  vasopressin Infusion 0.03 Unit(s)/Min IV Continuous <Continuous>      ACCESS DEVICES:  [ ] Peripheral IV  [ ] Central Venous Line		[ ] R	[ ] L	[ ] IJ	[ ] Fem	[ ] SC	Placed:   [ ] Arterial Line			[ ] R	[ ] L	[ ] Fem	[ ] Rad	[ ] Ax	Placed:   [ ] PICC:					[ ] Mediport  [ ] Urinary Catheter, Date Placed:   [ ] Necessity of urinary, arterial, and venous catheters discussed    OTHER MEDICATIONS:  chlorhexidine 0.12% Liquid 15 milliLiter(s) Oral Mucosa every 12 hours  chlorhexidine 2% Cloths 1 Application(s) Topical <User Schedule>  CRRT Treatment    <Continuous>  mupirocin 2% Ointment 1 Application(s) Topical every 12 hours  nystatin Powder 1 Application(s) Topical every 12 hours  PrismaSATE Dialysate BGK 4 / 2.5 5000 milliLiter(s) CRRT <Continuous>  PrismaSOL Filtration BGK 4 / 2.5 5000 milliLiter(s) CRRT <Continuous>  PrismaSOL Filtration BGK 4 / 2.5 5000 milliLiter(s) CRRT <Continuous>      IMAGING:

## 2024-12-24 NOTE — PROGRESS NOTE ADULT - ASSESSMENT
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 Post-op course c/b worsening mental status and acute hypoxic respiratory failure requiring multiple intubations/extubations, currently extubated (as of 11/26/24) and colonic ileus s/p several rounds of Relistor and Neostigmine with NGT and rectal tube currently in place now with septic shock of unclear etiology. Transplant nephrology consulted for metabolic acidosis and MORAIMA.    1. s/p OLT 11/15/24 with oliguric MORAIMA in setting of septic shock.  Likely hemodynamically mediated in setting of cardiogenic and septic shock on multiple vasopressors and prior IABP.   CT AP non-con: Bilateral renal cysts including cysts with peripheral calcification in the left kidney.  Initiated on CRRT 12/7/24- 12/15/24 at 1AM stopped. s/p IHD 12/18/24 however required levophed.   Now back on CRRT, will continue

## 2024-12-24 NOTE — PROGRESS NOTE ADULT - ASSESSMENT
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 Post op course complicated by acute hypoxic respiratory failure requiring intubation multiple times, most recently on 12/7 with conversion to tracheostomy on 12/17, e.coli bacteremia with RTOR on 12/7 for concern for worsening septic shock and cardiogenic shock s/p balloon pump placement and total abdominal colectomy in setting of ischemic bowel s/p OR on 12/9 for ileostomy creation, and olirugirc MORAIMA requiring CRRT and now intermittent HD.    Diagnosed with pneumonia secondary to Stenotrophomonas    Also with growth of Enterococcus faecalis from abdominal drains and urine culture    More fever and shock event on 12/23/24    UA (12/19) 2 WBC  BCx (12/23) NGTD  Bronch Cx (12/23) NGTD    CT Chest (12/23) Bibasilar groundglass and tree in bud opacities which may represent distal airway impaction versus pneumonia.    CT A/P (12/23) Peripheral wedge-shaped areas of hypoattenuation involving the superior aspect of the spleen, suggestive of splenic infarcts (12-59). Mildly dilated loops of proximal small bowel without transition point, likely ileus.    Testicular US (12/23) No appreciable arterial flow with very limited venous flow in in the testes bilaterally. Interval decrease in diffuse scrotal edema. Small bilateral hydroceles.    Antibiotic Course:  Meropenem ( 11/22-11/29, 11/22-11/29, 12/16-)   Minocycline (12/21-)  Bactrim (11/16-11/24, 12/8-12/11, 12/21-)  Fluconazole (11/16-12/2, 12/12-12/16)   Caspofungin ( 12/6-12/11, 12/17-)  Cefepime (12/10-12/16)   Metronidazole (12/10-12/14)   Ganciclovir (12/7-12/13)   Linezolid (11/23-11/26, 12/6-12/11)   Atovaquone (11/29-12/6)   Zosyn (11/20-11/22,12/6)   Tobramycin (12/6)   Valganciclovir (11/6-12/4)    #Leukocytosis, Fever, Shock, Transaminitis, Stenotrophomonas Pneumonia  --If no acute events tomorrow, continued improvement in secretions would stop Bactrim tomorrow (double Stenotrophomonas coverage)  --Recommend CT Pelvis (to further characterize scrotal contents)  --Can hold off of further Vancomycin (meropenem should confer sufficient Enterococcus faecalis coverage)  --Continue Minocycline 200 mg IV Q12H  --Continue Meropenem 1g IV Q12H  --Doubt need for Caspofungin; no growth of fungal organisms    #Liver Transplant Recipient, Prophylactic Antibiotic  --Repeat CMV PCR on 12/30  --Once IV treatment dose Bactrim stopped would switch to PO Bactrim for PCP PPx    Dr. Wes Carrero will be covering the patient starting from tomorrow. Please reach out to her for further questions and follow up.     Kyle Junior M.D.  [facility] Division of Infectious Disease  8AM-5PM Monday - Friday: Available on [facility] Teams  After Hours and Holidays (or if no response on [facility] Teams): Please contact the Infectious Diseases Office at [phone]     The above assessment and plan were discussed with SICU Team

## 2024-12-24 NOTE — CONSULT NOTE ADULT - SUBJECTIVE AND OBJECTIVE BOX
Cardiology Consult Note  ------------------------------------------------------------------------------------------  Brief HPI:  Patient with prolonged hospital course. In brief, he is a 73 y/o male with PMHx of non-obstructive CAD (mid-RCA ~50%, mid-LCx ~50-60%), HTN, DM, paroxysmal Afib s/p ablation (not on AC due to ?thrombocytopenia), GONZALES cirrhosis who underwent a OLT on 11/15/24. Post-op course was complicated by ischemic bowel s/p total colectomy (12/7), septic shock 2/2 E.coli bacteremia, cardiogenic shock (briefly on dobutamine and IABP 12/7 - 12/10), multiple intubations (now on tracheostomy to vent), renal failure requiring CRRT.     Per chart review, patient had exhibited anginal symptoms postoperatively this admission. His cardiologist Dr. Quintanilla had recommended starting IV heparin for treatment of presumed acute coronary syndrome (2/2 vasospasm vs occlusion) due to dynamic EKG changes on 12/19. He was briefly started on IV heparin, but it was stopped due to a rapid drop in hemoglobin (~6.6) requiring blood transfusion.     Cardiology was re-called on 12/22 for TWIs and up-trending troponin. Ultimately, re-challenge with anti-coagulation was deferred given grossly normal LV function on TTE and lack of dynamic ST segmental changes concerning for ischemia. In the interval period patient has been intermittently febrile, followed by transplant ID. Additionally of note he has testicular erythema/skin sloughing in the setting of poor arterial flow to the region for which urology is following.     Patient this evening was noted with increased tachypnea, tachycardia, and ST-depressions noted on monitor. 12-lead ECG w/ ST depressions in precordial leads, appear subjectively worse when compared to 12/19 tracing.     On evaluation patient is unresponsive. Does not follow commands. He is on trach to vent. (+) abdominal drains. (+) wound vac. tachycaridc regular rhythm. (+) Vent sounds.     His BPs are labile ~10s - 140s systolic/ 40 - 60s diastolic on BP cuff. No a-line as patient has severe peripheral edema and aterial access cannot reliably be maintained. HR ~100-110, appears sinus w/ APCs. Sat is ~100% on PRVC mechanical ventilation. Per RN at bedside pressor requirments have been labie, presently on 0.04 of levophed and 0.04 of vasopressin, levophed being actively titrated.     Labs notable for:  - trop 220 -> 256 -> 274 -> 321 -> 406 -> 443 -> 454 -> 478 -> 604 -> 653 -> 721 -> (12/24) 728 -> 851 -> 975    - Hgb 9.2 (s/p 1u pRBCs last night)  - Platelets 38   - anti-PF4 negative  - apTT 44.3 | INR 1.36  - Lytes WNL  - AST//102 (up-trending)  Cr 2.02 (on iHD)  - VBG WNL. Lactate 2.0  -    TTE shows grossly normal LV function (unchanged from prior)    -------------------------------------------------------------------------------------------  VS:  T(F): 99.5 (12-24), Max: 99.9 (12-24)  HR: 116 (12-24) (98 - 128)  BP: 96/54 (12-24) (51/16 - 152/66)  RR: 27 (12-24)  SpO2: 100% (12-24)  I&O's Summary    23 Dec 2024 07:01  -  24 Dec 2024 07:00  --------------------------------------------------------  IN: 2732.1 mL / OUT: 1836 mL / NET: 896.1 mL    24 Dec 2024 07:01  -  24 Dec 2024 21:12  --------------------------------------------------------  IN: 2034.5 mL / OUT: 1512 mL / NET: 522.5 mL  -------------------------------------------------------------------------------------------  LABS:                          9.2    11.76 )-----------( 38       ( 24 Dec 2024 13:57 )             25.9     12-24    133[L]  |  99  |  58[H]  ----------------------------<  150[H]  3.8   |  21[L]  |  1.17    Ca    7.7[L]      24 Dec 2024 20:04  Phos  3.4     12-24  Mg     2.3     12-24    TPro  4.0[L]  /  Alb  2.6[L]  /  TBili  10.7[H]  /  DBili  x   /  AST  236[H]  /  ALT  102[H]  /  AlkPhos  169[H]  12-24    PT/INR - ( 24 Dec 2024 06:45 )   PT: 15.5 sec;   INR: 1.36 ratio         PTT - ( 24 Dec 2024 06:45 )  PTT:44.3 sec  CARDIAC MARKERS ( 24 Dec 2024 20:04 )  975 ng/L / x     / x     / x     / x     / x      CARDIAC MARKERS ( 24 Dec 2024 13:57 )  851 ng/L / x     / x     / x     / x     / x      CARDIAC MARKERS ( 24 Dec 2024 04:48 )  728 ng/L / x     / x     / x     / x     / x      CARDIAC MARKERS ( 23 Dec 2024 22:27 )  721 ng/L / x     / x     / x     / x     / x      CARDIAC MARKERS ( 23 Dec 2024 18:43 )  653 ng/L / x     / x     / x     / x     / x                -------------------------------------------------------------------------------------------  Meds:  albuterol/ipratropium for Nebulization 3 milliLiter(s) Nebulizer every 6 hours PRN  aspirin  chewable 81 milliGRAM(s) Oral daily  atorvastatin 80 milliGRAM(s) Oral at bedtime  buDESOnide    Inhalation Suspension 0.5 milliGRAM(s) Inhalation every 12 hours  calcium gluconate IVPB 2 Gram(s) IV Intermittent once  caspofungin IVPB 50 milliGRAM(s) IV Intermittent every 24 hours  caspofungin IVPB      chlorhexidine 0.12% Liquid 15 milliLiter(s) Oral Mucosa every 12 hours  chlorhexidine 2% Cloths 1 Application(s) Topical <User Schedule>  CRRT Treatment    <Continuous>  cycloSPORINE  , modified (GENGRAF) Solution 25 milliGRAM(s) Oral <User Schedule>  fentaNYL   Patch  12 MICROgram(s)/Hr. 1 Patch Transdermal every 48 hours  heparin   Injectable 5000 Unit(s) SubCutaneous every 12 hours  insulin lispro (ADMELOG) corrective regimen sliding scale   SubCutaneous every 6 hours  meropenem  IVPB 1000 milliGRAM(s) IV Intermittent every 12 hours  methylPREDNISolone sodium succinate Injectable 32 milliGRAM(s) IV Push <User Schedule>  midodrine 10 milliGRAM(s) Oral every 8 hours  minocycline IVPB      minocycline IVPB 200 milliGRAM(s) IV Intermittent every 12 hours  mupirocin 2% Ointment 1 Application(s) Topical every 12 hours  norepinephrine Infusion 0.05 MICROgram(s)/kG/Min IV Continuous <Continuous>  nystatin Powder 1 Application(s) Topical every 12 hours  pantoprazole  Injectable 40 milliGRAM(s) IV Push every 12 hours  PrismaSATE Dialysate BK 0 / 3.5 5000 milliLiter(s) CRRT <Continuous>  PrismaSOL Filtration BGK 4 / 2.5 5000 milliLiter(s) CRRT <Continuous>  PrismaSOL Filtration BGK 4 / 2.5 5000 milliLiter(s) CRRT <Continuous>  trimethoprim / sulfamethoxazole IVPB 390 milliGRAM(s) IV Intermittent every 12 hours  vasopressin Infusion 0.03 Unit(s)/Min IV Continuous <Continuous>    -------------------------------------------------------------------------------------------  Cardiovascular Diagnostic Testing:    ECG: Sinus tachycardia, STD in precordial leads    Echo:     TTE 12/22   1. Normal right ventricular cavity size and mildly reduced right ventricular systolic function.   2. No pericardial effusion seen.   3. Compared to the transthoracic echocardiogram performed on 12/11/2024, there have been no significant interval changes.   4. Left ventricular endocardium is not well visualized; however, the left ventricular systolic function appears grossly normal.   5. Technically difficult image quality.   6. There is no evidence of a left ventricular thrombus.    Stress Testing:    Cath:    Cath 4/2024  Diagnostic Summary:   - Non-obstructive CAD - Mid RCA 50% FFR negative 0.85, Distal LCx  50-60% FFR negative 0.86  - Remainder of vessels with mild luminal irregularities, co-dominant  coronary circulation  - LVEDP 19, no LV-Ao gradient on pullback   - Cathworks FFR with 3D functional mapping of color-coded FFR utilized  for analysis    No coronary contraindications for liver transplantation - asymptomatic  non-obstructive disease to be managed medically.    Recommendations:   - Medical management of non-obstructive CAD with high intensity statin    therapy and aggressive ASCVD risk factor modification      -------------------------------------------------------------------------------------------   Cardiology Consult Note  ------------------------------------------------------------------------------------------  Brief HPI:  Patient with prolonged hospital course. In brief, he is a 75 y/o male with PMHx of non-obstructive CAD (mid-RCA ~50%, mid-LCx ~50-60%), HTN, DM, paroxysmal Afib s/p ablation (not on AC due to ?thrombocytopenia), GONZALES cirrhosis who underwent a OLT on 11/15/24. Post-op course was complicated by ischemic bowel s/p total colectomy (12/7), septic shock 2/2 E.coli bacteremia, cardiogenic shock (briefly on dobutamine and IABP 12/7 - 12/10), multiple intubations (now on tracheostomy to vent), renal failure requiring CRRT.     Per chart review, patient had exhibited anginal symptoms postoperatively this admission. His cardiologist Dr. Quintanilla had recommended starting IV heparin for treatment of presumed acute coronary syndrome (2/2 vasospasm vs occlusion) due to dynamic EKG changes on 12/19. He was briefly started on IV heparin, but it was stopped due to a rapid drop in hemoglobin (~6.6) requiring blood transfusion.     Cardiology was re-called on 12/22 for TWIs and up-trending troponin. Ultimately, re-challenge with anti-coagulation was deferred given grossly normal LV function on TTE and lack of dynamic ST segmental changes concerning for ischemia. In the interval period patient has been intermittently febrile, followed by transplant ID. Additionally of note he has testicular erythema/skin sloughing in the setting of poor arterial flow to the region for which urology is following.     Patient this evening was noted with increased tachypnea, tachycardia, and ST-depressions noted on monitor. 12-lead ECG w/ ST depressions in precordial leads, appear subjectively worse when compared to 12/19 tracing.     On evaluation patient is unresponsive. Does not follow commands. He is on trach to vent. (+) abdominal drains. (+) wound vac. tachycaridc regular rhythm. (+) Vent sounds.     His BPs are labile ~10s - 140s systolic/ 40 - 60s diastolic on BP cuff. No a-line as patient has severe peripheral edema and aterial access cannot reliably be maintained. HR ~100-110, appears sinus w/ APCs. Sat is ~100% on PRVC mechanical ventilation. Per RN at bedside pressor requirements have been labie, presently on 0.04 of levophed and 0.04 of vasopressin, levophed being actively titrated.     Labs notable for:  - trop 220 -> 256 -> 274 -> 321 -> 406 -> 443 -> 454 -> 478 -> 604 -> 653 -> 721 -> (12/24) 728 -> 851 -> 975    - Hgb 9.2 (s/p 1u pRBCs last night)  - Platelets 38   - anti-PF4 negative  - apTT 44.3 | INR 1.36  - Lytes WNL  - AST//102 (up-trending)  Cr 2.02 (on iHD)  - VBG WNL. Lactate 2.0  -    TTE shows grossly normal LV function (unchanged from prior)    -------------------------------------------------------------------------------------------  VS:  T(F): 99.5 (12-24), Max: 99.9 (12-24)  HR: 116 (12-24) (98 - 128)  BP: 96/54 (12-24) (51/16 - 152/66)  RR: 27 (12-24)  SpO2: 100% (12-24)  I&O's Summary    23 Dec 2024 07:01  -  24 Dec 2024 07:00  --------------------------------------------------------  IN: 2732.1 mL / OUT: 1836 mL / NET: 896.1 mL    24 Dec 2024 07:01  -  24 Dec 2024 21:12  --------------------------------------------------------  IN: 2034.5 mL / OUT: 1512 mL / NET: 522.5 mL  -------------------------------------------------------------------------------------------  LABS:                          9.2    11.76 )-----------( 38       ( 24 Dec 2024 13:57 )             25.9     12-24    133[L]  |  99  |  58[H]  ----------------------------<  150[H]  3.8   |  21[L]  |  1.17    Ca    7.7[L]      24 Dec 2024 20:04  Phos  3.4     12-24  Mg     2.3     12-24    TPro  4.0[L]  /  Alb  2.6[L]  /  TBili  10.7[H]  /  DBili  x   /  AST  236[H]  /  ALT  102[H]  /  AlkPhos  169[H]  12-24    PT/INR - ( 24 Dec 2024 06:45 )   PT: 15.5 sec;   INR: 1.36 ratio         PTT - ( 24 Dec 2024 06:45 )  PTT:44.3 sec  CARDIAC MARKERS ( 24 Dec 2024 20:04 )  975 ng/L / x     / x     / x     / x     / x      CARDIAC MARKERS ( 24 Dec 2024 13:57 )  851 ng/L / x     / x     / x     / x     / x      CARDIAC MARKERS ( 24 Dec 2024 04:48 )  728 ng/L / x     / x     / x     / x     / x      CARDIAC MARKERS ( 23 Dec 2024 22:27 )  721 ng/L / x     / x     / x     / x     / x      CARDIAC MARKERS ( 23 Dec 2024 18:43 )  653 ng/L / x     / x     / x     / x     / x                -------------------------------------------------------------------------------------------  Meds:  albuterol/ipratropium for Nebulization 3 milliLiter(s) Nebulizer every 6 hours PRN  aspirin  chewable 81 milliGRAM(s) Oral daily  atorvastatin 80 milliGRAM(s) Oral at bedtime  buDESOnide    Inhalation Suspension 0.5 milliGRAM(s) Inhalation every 12 hours  calcium gluconate IVPB 2 Gram(s) IV Intermittent once  caspofungin IVPB 50 milliGRAM(s) IV Intermittent every 24 hours  caspofungin IVPB      chlorhexidine 0.12% Liquid 15 milliLiter(s) Oral Mucosa every 12 hours  chlorhexidine 2% Cloths 1 Application(s) Topical <User Schedule>  CRRT Treatment    <Continuous>  cycloSPORINE  , modified (GENGRAF) Solution 25 milliGRAM(s) Oral <User Schedule>  fentaNYL   Patch  12 MICROgram(s)/Hr. 1 Patch Transdermal every 48 hours  heparin   Injectable 5000 Unit(s) SubCutaneous every 12 hours  insulin lispro (ADMELOG) corrective regimen sliding scale   SubCutaneous every 6 hours  meropenem  IVPB 1000 milliGRAM(s) IV Intermittent every 12 hours  methylPREDNISolone sodium succinate Injectable 32 milliGRAM(s) IV Push <User Schedule>  midodrine 10 milliGRAM(s) Oral every 8 hours  minocycline IVPB      minocycline IVPB 200 milliGRAM(s) IV Intermittent every 12 hours  mupirocin 2% Ointment 1 Application(s) Topical every 12 hours  norepinephrine Infusion 0.05 MICROgram(s)/kG/Min IV Continuous <Continuous>  nystatin Powder 1 Application(s) Topical every 12 hours  pantoprazole  Injectable 40 milliGRAM(s) IV Push every 12 hours  PrismaSATE Dialysate BK 0 / 3.5 5000 milliLiter(s) CRRT <Continuous>  PrismaSOL Filtration BGK 4 / 2.5 5000 milliLiter(s) CRRT <Continuous>  PrismaSOL Filtration BGK 4 / 2.5 5000 milliLiter(s) CRRT <Continuous>  trimethoprim / sulfamethoxazole IVPB 390 milliGRAM(s) IV Intermittent every 12 hours  vasopressin Infusion 0.03 Unit(s)/Min IV Continuous <Continuous>    -------------------------------------------------------------------------------------------  Cardiovascular Diagnostic Testing:    ECG: Sinus tachycardia, STD in precordial leads    Echo:     TTE 12/22   1. Normal right ventricular cavity size and mildly reduced right ventricular systolic function.   2. No pericardial effusion seen.   3. Compared to the transthoracic echocardiogram performed on 12/11/2024, there have been no significant interval changes.   4. Left ventricular endocardium is not well visualized; however, the left ventricular systolic function appears grossly normal.   5. Technically difficult image quality.   6. There is no evidence of a left ventricular thrombus.    Stress Testing:    Cath:    Cath 4/2024  Diagnostic Summary:   - Non-obstructive CAD - Mid RCA 50% FFR negative 0.85, Distal LCx  50-60% FFR negative 0.86  - Remainder of vessels with mild luminal irregularities, co-dominant  coronary circulation  - LVEDP 19, no LV-Ao gradient on pullback   - Cathworks FFR with 3D functional mapping of color-coded FFR utilized  for analysis    No coronary contraindications for liver transplantation - asymptomatic  non-obstructive disease to be managed medically.    Recommendations:   - Medical management of non-obstructive CAD with high intensity statin    therapy and aggressive ASCVD risk factor modification      -------------------------------------------------------------------------------------------

## 2024-12-25 ENCOUNTER — RESULT REVIEW (OUTPATIENT)
Age: 74
End: 2024-12-25

## 2024-12-25 NOTE — PROGRESS NOTE ADULT - SUBJECTIVE AND OBJECTIVE BOX
Transplant Surgery - Multidisciplinary Rounds  --------------------------------------------------------------  OLT   11/15/2024        POD#40  Colectomy 12/7/2024   POD#118  IABP 12/7 removed 12/10  Tracheostomy 12/17/2024    Present:   Patient seen and examined with multidisciplinary Transplant team including Surgeon: Dr. James, Dr. Sorto, JAZZ Colby, Hepatologist: Dr. Hidalgo and ICU team in AM rounds.   Disciplines not in attendance will be notified of the plan.     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     s/p OLT 11/15 with post op course c/b:  ·	Hypoxia: Reintubated 11/20, extubated 11/24->reintubated 11/24, extubated 11/26, reintubated 12/7, trached 12/17  ·	Ileus   ·	A fib  ·	AMS/Seizure   ·	L brachial DVT  ·	Neutropenia  ·	E coli Bacteremia (12/6)  ·	s/p Coloctomy 12/7.   ·	IABP 12/7, removed 12/10  ·	Ec Faecalis UTI (12/16)  ·	Stenotrophamonas in trach aspirate (12/19)    Interval/Overnight Events:   - On pressors, afebrile  - started midodrine  - EKG: ST depression, tachycardic, troponin 1k  - per cards started argatroban  - LFT's uptrending, Liver US    Immunosuppression:  -Cyclo by level , MMF HELD, Solu 32  -ongoing monitoring for signs of rejection    TX DATA HERE      ROS: Unable to assess patient is lethargic, s/p tracheostomy    PHYSICAL EXAM:   Constitutional: trach to vent, lethargic  Eyes:  PERRLA  ENMT: nc/at, no thrush   Neck: supple   Respiratory: CTA B/L  Cardiovascular: RRR  Gastrointestinal: incision clean/dry/intact + Ostomy pink, wound vac  Genitourinary: +scrotal edema, Castro in place  Extremities: SCD's in place and working bilaterally, + LE edema  Neurological: trach to vent , sedated   Skin: no rashes, ulcerations, lesions    Transplant Surgery - Multidisciplinary Rounds  --------------------------------------------------------------  OLT   11/15/2024        POD#40  Colectomy 12/7/2024   POD#18  IABP 12/7 removed 12/10  Tracheostomy 12/17/2024    Present:   Patient seen and examined with multidisciplinary Transplant team including Surgeon: Dr. James, Dr. Sorto, JAZZ Colby/[facility], Hepatologist: Dr. Hidalgo and ICU team in AM rounds.   Disciplines not in attendance will be notified of the plan.     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     s/p OLT 11/15 with post op course c/b:  ·	Hypoxia: Reintubated 11/20, extubated 11/24->reintubated 11/24, extubated 11/26, reintubated 12/7, trached 12/17  ·	Ileus   ·	A fib  ·	AMS/Seizure   ·	L brachial DVT  ·	Neutropenia  ·	E coli Bacteremia (12/6)  ·	s/p Coloctomy 12/7.   ·	IABP 12/7, removed 12/10  ·	Ec Faecalis UTI (12/16)  ·	Stenotrophamonas in trach aspirate (12/19)    Interval/Overnight Events:   - On pressors, afebrile  - uptrending trops 1800s  - uptrending lactate 5.8   - LFT's uptrending, Liver US  - Albumin 25% q6h     Immunosuppression:  -Cyclo by level , MMF HELD, Solu 32  -ongoing monitoring for signs of rejection    MEDICATIONS  (STANDING):  albumin human 25% IVPB 50 milliLiter(s) IV Intermittent every 6 hours  argatroban Infusion 0.12 MICROgram(s)/kG/Min (0.72 mL/Hr) IV Continuous <Continuous>  aspirin  chewable 81 milliGRAM(s) Oral daily  atorvastatin 80 milliGRAM(s) Oral at bedtime  buDESOnide    Inhalation Suspension 0.5 milliGRAM(s) Inhalation every 12 hours  caspofungin IVPB      caspofungin IVPB 50 milliGRAM(s) IV Intermittent every 24 hours  chlorhexidine 0.12% Liquid 15 milliLiter(s) Oral Mucosa every 12 hours  chlorhexidine 2% Cloths 1 Application(s) Topical <User Schedule>  cycloSPORINE  , modified (GENGRAF) Solution 25 milliGRAM(s) Oral <User Schedule>  fentaNYL   Patch  12 MICROgram(s)/Hr. 1 Patch Transdermal every 48 hours  insulin lispro (ADMELOG) corrective regimen sliding scale   SubCutaneous every 6 hours  meropenem  IVPB 1000 milliGRAM(s) IV Intermittent every 12 hours  methylPREDNISolone sodium succinate Injectable 32 milliGRAM(s) IV Push <User Schedule>  midodrine 10 milliGRAM(s) Oral every 8 hours  minocycline IVPB      minocycline IVPB 200 milliGRAM(s) IV Intermittent every 12 hours  mupirocin 2% Ointment 1 Application(s) Topical every 12 hours  norepinephrine Infusion 0.05 MICROgram(s)/kG/Min (9.33 mL/Hr) IV Continuous <Continuous>  nystatin Powder 1 Application(s) Topical every 12 hours  pantoprazole  Injectable 40 milliGRAM(s) IV Push every 12 hours  phenylephrine    Infusion 0.1 MICROgram(s)/kG/Min (1.87 mL/Hr) IV Continuous <Continuous>  vancomycin  IVPB 1000 milliGRAM(s) IV Intermittent once  vasopressin Infusion 0.03 Unit(s)/Min (4.5 mL/Hr) IV Continuous <Continuous>    MEDICATIONS  (PRN):  albuterol/ipratropium for Nebulization 3 milliLiter(s) Nebulizer every 6 hours PRN Shortness of Breath and/or Wheezing    PAST MEDICAL & SURGICAL HISTORY:  Diabetes    Transaminitis    Paroxysmal atrial fibrillation    Depression    BPH (benign prostatic hyperplasia)    Hypertension    Chronic atrial fibrillation    Coronary artery disease    Hepatocellular carcinoma    DM (diabetes mellitus)    HTN (hypertension)    Paroxysmal atrial fibrillation    Cirrhosis    HCC (hepatocellular carcinoma)    History of BPH    History of laparoscopic cholecystectomy    History of lumbar laminectomy    H/O prior ablation treatment    H/O percutaneous left heart catheterization    Vital Signs Last 24 Hrs  T(C): 38 (25 Dec 2024 11:00), Max: 38 (25 Dec 2024 09:30)  T(F): 100.4 (25 Dec 2024 11:00), Max: 100.4 (25 Dec 2024 09:30)  HR: 100 (25 Dec 2024 17:45) (93 - 124)  BP: 118/53 (25 Dec 2024 14:30) (54/32 - 177/140)  BP(mean): 76 (25 Dec 2024 14:30) (38 - 155)  RR: 36 (25 Dec 2024 17:45) (17 - 58)  SpO2: 99% (25 Dec 2024 17:45) (96% - 100%)    Parameters below as of 25 Dec 2024 07:00  Patient On (Oxygen Delivery Method): ventilator    O2 Concentration (%): 30    I&O's Summary    24 Dec 2024 07:01  -  25 Dec 2024 07:00  --------------------------------------------------------  IN: 3501.7 mL / OUT: 3208 mL / NET: 293.7 mL    25 Dec 2024 07:01  -  25 Dec 2024 17:57  --------------------------------------------------------  IN: 490.5 mL / OUT: 629 mL / NET: -138.5 mL               9.0    15.02 )-----------( 38       ( 25 Dec 2024 12:44 )             27.0     12-25    134[L]  |  99  |  38[H]  ----------------------------<  96  3.5   |  19[L]  |  0.81    Ca    8.0[L]      25 Dec 2024 12:44  Phos  3.1     12-25  Mg     2.5     12-25    TPro  4.1[L]  /  Alb  2.8[L]  /  TBili  11.0[H]  /  DBili  x   /  AST  530[H]  /  ALT  190[H]  /  AlkPhos  200[H]  12-25    Culture - Blood (collected 12-24-24 @ 03:30)  Source: .Blood BLOOD  Preliminary Report (12-25-24 @ 07:01):    No growth at 24 hours    Culture - Bronchial (collected 12-23-24 @ 16:37)  Source: Combi-Cath  Gram Stain (12-23-24 @ 22:50):    No polymorphonuclear cells seen per low power field    No squamous epithelial cells per low power field    No organisms seen per oil power field  Final Report (12-25-24 @ 08:06):    Commensal charity consistent with body site    Culture - Blood (collected 12-23-24 @ 16:03)  Source: .Blood BLOOD  Preliminary Report (12-24-24 @ 20:01):    No growth at 24 hours    Culture - Body Fluid with Gram Stain (collected 12-20-24 @ 18:23)  Source: Abdominal Fl  Gram Stain (12-21-24 @ 05:56):    polymorphonuclear leukocytes seen    Gram positive cocci in pairs seen    by cytocentrifuge  Preliminary Report (12-21-24 @ 21:12):    Rare Enterococcus faecalis  Organism: Enterococcus faecalis (12-22-24 @ 19:26)  Organism: Enterococcus faecalis (12-22-24 @ 19:26)    Culture - Sputum (collected 12-19-24 @ 14:31)  Source: Trach Asp Tracheal Aspirate  Gram Stain (12-20-24 @ 00:18):    Moderate polymorphonuclear leukocytes per low power field    Rare Squamous epithelial cells per low power field    Numerous Gram Negative Rods per oil power field    Numerous Yeast like cells per oil power field  Final Report (12-21-24 @ 14:57):    Moderate Stenotrophomonas maltophilia    Commensal charity consistent with body site  Organism: Stenotrophomonas maltophilia (12-21-24 @ 14:57)  Organism: Stenotrophomonas maltophilia (12-21-24 @ 14:57)  Organism: Stenotrophomonas maltophilia (12-21-24 @ 14:57)    Culture - Blood (collected 12-19-24 @ 03:28)  Source: .Blood BLOOD  Final Report (12-24-24 @ 07:00):    No growth at 5 days    Culture - Blood (collected 12-19-24 @ 03:28)  Source: .Blood BLOOD  Final Report (12-24-24 @ 07:00):    No growth at 5 days    ROS: Unable to assess patient is lethargic, s/p tracheostomy    PHYSICAL EXAM:   Constitutional: trach to vent, lethargic  Eyes:  PERRLA  ENMT: nc/at, no thrush   Neck: supple   Respiratory: CTA B/L  Cardiovascular: RRR  Gastrointestinal: incision clean/dry/intact + Ostomy pink, wound vac  Genitourinary: +scrotal edema, Castro in place  Extremities: SCD's in place and working bilaterally, + LE edema  Neurological: trach to vent , sedated   Skin: no rashes, ulcerations, lesions

## 2024-12-25 NOTE — PROGRESS NOTE ADULT - ASSESSMENT
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 Post-op course c/b worsening mental status and acute hypoxic respiratory failure requiring multiple intubations/extubations, no longer intubated, now s/p tracheostomy (as of 12/23/2024) and cardiogenic shock s/p Percutaneous insertion of an intra-aortic balloon pump and septic shock/colonic ischemia s/p total abdominal colectomy with ileostomy on 12/7/24, s/p ex-lap w/ileostomy on 12/9/24. Urology initially consulted 12/9-12/16 for scrotal edema w/ large skin breakdown. Scrotum at that time was larger, more red edematous, swollen with skin breakdown, no crepitus felt. Scrotal/testicular ultrasounds were performed at that time, all showing scrotal edema, no necrotizing infection/abscess. Urology signed off, recommending scrotal support, wound care, no surgical intervention indicated at that time.     Urology was recalled today 12/23 for worsening of scrotal skin sloughing. Per primary team, edema and scrotal swelling has improved. However, there is a layer of brown film overlaying the ventral area of the scrotum encompassing both the ventral layer of skin of the left and right scrotum. The brown film sloughs and bleed superficially when irritated, it is cold to the touch. There is an area of similar film overlaying a section of the right inner thigh. There is clear delineation from the brown layer of film and normal skin-colored scrotum. The normal colored scrotal skin is warm to touch.  No appreciable flow to testicles shown on ultrasound likely also 2/2 to prolonged pressor requirements decreasing blood flow to testicles and should resolve with improved peripheral perfusion. On examination testicles in normal lie and are soft, no role for orchiopexy for testicles.    Recs  - No appreciable flow to testicles shown on ultrasound likely also 2/2 to prolonged pressor requirements decreasing blood flow to testicles and should resolve with improved peripheral perfusion. On examination testicles in normal lie and are soft, no role for orchiopexy for testicles.  - No urologic surgical intervention indicated at this time -> Exam today on 12/25 similar to yesterday. Still with brown likely dead skin overlying the scrotal wall and underlying tissue appears to be healthy. CT reviewed and does not show any evidence of necrotizing infection to suggest more invasive process requiring debridement and source control. Recommend reconsult dermatology to see if they have any additional recommendations.   - Wet to dry dressing to aid in debridement of tissue   - Continue with antibiotics   - Scrotal support  - Continue with wound care  - Urology to follow     Discussed with Dr. Guerrero     [facility]  [address] [place] [id number] [phone]

## 2024-12-25 NOTE — PROGRESS NOTE ADULT - ASSESSMENT
74 male w/ a PMHx of HTN, DM, AF, BPH, MASH cirrhosis and HCC s/p OLT on 11/15. Case was uneventful but post-op course has been prolonged and c/b delirium, aspiration, multiple episodes of acute hypoxic respiratory failure requiring reintubation from 11/20-11/24 & 11/24-11/26, AF w/ RVR, ileus requiring NGT, distended colon requiring rectal tube, dysphagia, malnutrition, hypernatremia, fevers, pancytopenia, poorly controlled glucoses, and left brachial VTE. Patient went into severe refractory septic shock on 12/6 w/ blood cultures positive for E. coli s/p s/p ex lap, total abd colectomy, end ileostomy, 3 intentionally retained laparotomy pads for bleeding, Abthera, IABP placement w/ admission to CTICU. TTE revealed LVOT obstruction & TODD. Patient required inotropes, Jacqui, and CRRT post-op. s/p closure 12/9. IABP removed 12/10. Readmitted to SICU on 12/13. s/p tracheostomy 12/17.    Neuro:  - Opens eyes intermittently, Not following commands, off sedation  - pain control w/ oxycodone  - CT head 11/19, 11/21, 11/25, 11/29, 12/5 negative  - EEG: Mild diffuse cerebral dysfunction that is not specific in etiology. No epileptic discharges recorded. No seizures recorded.  - Holding home xanax, Abilify, Zoloft, Remeron, Lexapro  - CT head 12/20 showing age-indeterminate infarct, f/u Neurology recs  - No need for MRI     Resp:  -S/p bedside tracheostomy 12/17  -PRVC 14/480/5/30  -SBT daily  -c/w inhaled bronchodilator  -VBG daily    CV:  -IABP removed 12/10  -s/p dobutamine and amiodarone gtt   -home metoprolol 12.5 q8hr held for pressor req  -TTE 12/11 shows EF of 55-60%  - +/- levo  - cardiology reconsulted, trending troponin    GI:  -trend LFTs  -NPO w/ tube feeds, KO tube post pyloric  -protonix QD  - monitor ALYSSA output    /Renal:  -CRRT restarted 12/23  -Monitor BUN/Cr, I&Os, trend UOP  -Scrotal US 12/15 -> edema, no abscess -> elevate  - rpt scrotal doppler for concern of ischemia > low flow state, low concern for abscess  -nichols    Heme:  -trend H/H  -monitor coags  -trend platelets  -argatroban gtt started 12/25    ID:  -ABx transition to jeniffer, caspo (12/16-), bactrim, minocycline  -Tracheal aspirate -> Stenotrophomas maltophilia  -U Cx 12/16 positive, Combi cath 12/19 positive  -monitor WBC, fever curve  -CMV PPx w/ ganciclovir (holding now iso thrombocytopenia)  -holding cellcept, cyclosporine    Endo:  -monitor glucose   -methylprednisone 32 qd  -ISS, NPH d/c 2/2 hypoglycemia    Lines:   -KO tube post pyloric  -nichols exchanged 12/16  -ALYSSA x 2   -DENNISE Fine

## 2024-12-25 NOTE — PROGRESS NOTE ADULT - SUBJECTIVE AND OBJECTIVE BOX
INFECTIOUS DISEASES FOLLOW UP-- Renée Carrero  [phone]    This is a follow up note for this  74yMale with  Status post liver transplantation    febrile low grade  poor mental status    ROS:  CONSTITUTIONAL: somnolent    Allergies    No Known Allergies    Intolerances        ANTIBIOTICS/RELEVANT:  antimicrobials  caspofungin IVPB      caspofungin IVPB 50 milliGRAM(s) IV Intermittent every 24 hours  meropenem  IVPB 1000 milliGRAM(s) IV Intermittent every 12 hours  minocycline IVPB      minocycline IVPB 200 milliGRAM(s) IV Intermittent every 12 hours    immunologic:  cycloSPORINE  , modified (GENGRAF) Solution 25 milliGRAM(s) Oral <User Schedule>    OTHER:  albumin human 25% IVPB 50 milliLiter(s) IV Intermittent every 6 hours  albuterol/ipratropium for Nebulization 3 milliLiter(s) Nebulizer every 6 hours PRN  argatroban Infusion 0.12 MICROgram(s)/kG/Min IV Continuous <Continuous>  aspirin  chewable 81 milliGRAM(s) Oral daily  atorvastatin 80 milliGRAM(s) Oral at bedtime  buDESOnide    Inhalation Suspension 0.5 milliGRAM(s) Inhalation every 12 hours  chlorhexidine 0.12% Liquid 15 milliLiter(s) Oral Mucosa every 12 hours  chlorhexidine 2% Cloths 1 Application(s) Topical <User Schedule>  fentaNYL   Patch  12 MICROgram(s)/Hr. 1 Patch Transdermal every 48 hours  insulin lispro (ADMELOG) corrective regimen sliding scale   SubCutaneous every 6 hours  methylPREDNISolone sodium succinate Injectable 32 milliGRAM(s) IV Push <User Schedule>  midodrine 10 milliGRAM(s) Oral every 8 hours  mupirocin 2% Ointment 1 Application(s) Topical every 12 hours  norepinephrine Infusion 0.05 MICROgram(s)/kG/Min IV Continuous <Continuous>  nystatin Powder 1 Application(s) Topical every 12 hours  pantoprazole  Injectable 40 milliGRAM(s) IV Push every 12 hours  phenylephrine    Infusion 0.1 MICROgram(s)/kG/Min IV Continuous <Continuous>  vasopressin Infusion 0.03 Unit(s)/Min IV Continuous <Continuous>      Objective:  Vital Signs Last 24 Hrs  T(C): 38 (25 Dec 2024 11:00), Max: 38 (25 Dec 2024 09:30)  T(F): 100.4 (25 Dec 2024 11:00), Max: 100.4 (25 Dec 2024 09:30)  HR: 110 (25 Dec 2024 15:00) (93 - 124)  BP: 118/53 (25 Dec 2024 14:30) (54/32 - 177/140)  BP(mean): 76 (25 Dec 2024 14:30) (38 - 155)  RR: 31 (25 Dec 2024 15:00) (17 - 58)  SpO2: 100% (25 Dec 2024 15:00) (99% - 100%)    Parameters below as of 25 Dec 2024 07:00  Patient On (Oxygen Delivery Method): ventilator    O2 Concentration (%): 30    PHYSICAL EXAM:  Constitutional: frail chronically ill appearing  Eyes:DUSTIN,   Ear/Nose/Throat: trach/vent  HD catheter	  Respiratory: audible bilat  Cardiovascular: S1S2  Gastrointestinal:ostomy functioning, drains on left  Extremities:no e/e/c  No Lymphadenopathy  IV sites not inflammed.    LABS:                        9.0    15.02 )-----------( 38       ( 25 Dec 2024 12:44 )             27.0     12-25    134[L]  |  99  |  38[H]  ----------------------------<  96  3.5   |  19[L]  |  0.81    Ca    8.0[L]      25 Dec 2024 12:44  Phos  3.1     12-25  Mg     2.5     12-25    TPro  4.1[L]  /  Alb  2.8[L]  /  TBili  11.0[H]  /  DBili  x   /  AST  530[H]  /  ALT  190[H]  /  AlkPhos  200[H]  12-25    PT/INR - ( 25 Dec 2024 06:10 )   PT: 23.3 sec;   INR: 2.06 ratio         PTT - ( 25 Dec 2024 12:44 )  PTT:106.4 sec  Urinalysis Basic - ( 25 Dec 2024 12:44 )    Color: x / Appearance: x / SG: x / pH: x  Gluc: 96 mg/dL / Ketone: x  / Bili: x / Urobili: x   Blood: x / Protein: x / Nitrite: x   Leuk Esterase: x / RBC: x / WBC x   Sq Epi: x / Non Sq Epi: x / Bacteria: x        MICROBIOLOGY:            RECENT CULTURES:  12-24 @ 03:30  .Blood BLOOD  --  --  --    No growth at 24 hours  --  12-23 @ 16:37  Combi-Cath  --  --  --    Commensal charity consistent with body site  --  12-23 @ 16:03  .Blood BLOOD  --  --  --    No growth at 24 hours  --  12-20 @ 18:23  Abdominal Fl  Enterococcus faecalis  Enterococcus faecalis  ESAU    Rare Enterococcus faecalis  --  12-19 @ 14:31  Trach Asp Tracheal Aspirate  Stenotrophomonas maltophilia  Stenotrophomonas maltophilia  ESAU    Moderate Stenotrophomonas maltophilia  Commensal charity consistent with body site  --  12-19 @ 03:28  .Blood BLOOD  --  --  --    No growth at 5 days  --      RADIOLOGY & ADDITIONAL STUDIES:    < from: US Doppler Scrotum (12.23.24 @ 22:17) >    IMPRESSION:  No appreciable arterial flow with very limited venous flow in in the   testes bilaterally.  Interval decrease in diffuse scrotal edema.  Small bilateral hydroceles.    Continued sonographic follow-up is advised. Additionally, urologic   consultation is recommended.    --- End of Report ---    < end of copied text >

## 2024-12-25 NOTE — PROGRESS NOTE ADULT - ASSESSMENT
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   s/p OLT 11/15 with complicated post op course    [] Septic shock/Cardiogenic shock  [] s/p Colectomy 12/7   [] RTOR for closure and Ileostomy creation   [] IAPB 12/7- removed 12/10:   - E coli Bacteremia (12/6) - on jeniffer/caspo,  Received Tobra x 1 12/6 .   - EC faecalis asc fluid ( 12/20)  - Ec faecalis UTI (12/16)  - Tx ID following - c/w Jeniffer/Caspo/Minocycline, vanco by level, bactrim IV   - Neutropenia: received Neupogen 480mcg x2  - MORAIMA: CRRT started 12/7, stopped 12/15, resumed CRRT 12/23  - AMS; CT Head neg  - Hold diuretics   - 12/23 repeat blood cultures ngtd  - CT chest/Abd/pelvis: GGO, splenic infarcts, ileus  - S/P tracheotomy 12/17    [] s/p OLT POD #40  - LFT's uptrending, Liver US   - Diet: increase TFs as needed  - Pain management: avoid narcotics  - Strict I&Os, nichols, wound vac placed 12/10   - US: L brachial DVT   - wound vac exchange for ileostomy (12/13)  - HIT panel neg (12/14)    [ ] Immunosuppression  - Tacrolimus stopped 11/18 in setting of AMS/Seizure, Started Cyclo 12/17  - Cyclo by level, MMF HELD, Solumedrol 32 mg daily  - PPx: Gancyclovir (HELD) in setting of thrombocytopenia; repeat CMV PCR pending     [] lleus   -@ home on Linzess  - imaging with distended colon (likely opioid induced)  - s/p Neostigmine x 2  - s/p Relistor, last dose 12/1  - s/p colectomy with end ileostomy  (see above)  - Daily AXR     [] CMV viremia  - d/c gancylovir due to thrombocytopenia  - CMV PCR (12/11 and 12/16): neg, repeat CMV pending from 12/24    [ ] AMS  - Reintubated 11/20 Aspiration/hypoxia, extubated 11/24->[id number]/24--> extubated 11/26 -> [id number]/7 -> tracheostomy 12/17  - EEG 11/30 negative, Neurology following  - BH following   - off tacro  - on keppra  -CT Head No Cont (12/20): Age-indeterminate posterior left frontal lobe infarct.     [ ] HTN/ pAFib  [ ] coronary vasospasm vs posterior wall MI  - EKG 12/24 c/f ST depression, rising troponins  - started argatroban per cards with ptt goal 60-80  - Heparin gtt 12/19-21  - Cards- plan for PCI prior to DC   - Off Amiodarone, off dobutamine  - c/w metoprolol  - Dr. 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   s/p OLT 11/15 with complicated post op course    [] Septic shock/Cardiogenic shock  [] s/p Colectomy 12/7   [] RTOR for closure and Ileostomy creation   [] IAPB 12/7- removed 12/10:   - E coli Bacteremia (12/6) - on jeniffer/caspo,  Received Tobra x 1 12/6 .   - EC faecalis asc fluid ( 12/20)  - Ec faecalis UTI (12/16)  - Tx ID following - c/w Jeniffer/Caspo/Minocycline, vanco by level, bactrim IV   - Neutropenia: received Neupogen 480mcg x2  - MORAIMA: CRRT started 12/7, stopped 12/15, resumed CRRT 12/23  - AMS; CT Head neg  - Hold diuretics   - 12/23 repeat blood cultures ngtd  - CT chest/Abd/pelvis: GGO, splenic infarcts, ileus  - S/P tracheotomy 12/17    [] s/p OLT POD #40  - LFT's uptrending, Liver US   - Diet: increase TFs as needed  - Pain management: avoid narcotics  - Strict I&Os, nichols, wound vac placed 12/10   - US: L brachial DVT   - wound vac exchange for ileostomy (12/13)  - HIT panel neg (12/14)    [ ] Immunosuppression  - Tacrolimus stopped 11/18 in setting of AMS/Seizure, Started Cyclo 12/17  - Cyclo by level, MMF HELD, Solumedrol 32 mg daily  - PPx: Gancyclovir (HELD) in setting of thrombocytopenia; repeat CMV PCR pending     [] lleus   -@ home on Linzess  - imaging with distended colon (likely opioid induced)  - s/p Neostigmine x 2  - s/p Relistor, last dose 12/1  - s/p colectomy with end ileostomy  (see above)  - Daily AXR     [] CMV viremia  - d/c gancylovir due to thrombocytopenia  - CMV PCR (12/11 and 12/16): neg, repeat CMV pending from 12/24    [ ] AMS  - Reintubated 11/20 Aspiration/hypoxia, extubated 11/24->[id number]/24--> extubated 11/26 -> [id number]/7 -> tracheostomy 12/17  - EEG 11/30 negative, Neurology following  - BH following   - off tacro  - on keppra  -CT Head No Cont (12/20): Age-indeterminate posterior left frontal lobe infarct.   -CT Head (12/25): Evolving subacute infarct in the left supramarginal gyrus, recommend MRI.    [ ] HTN/ pAFib  [ ] coronary vasospasm vs posterior wall MI  - EKG 12/24 c/f ST depression, rising troponins: 1800s  - started argatroban per cards with ptt goal 60-80  - Heparin gtt 12/19-21  - Cards- plan for PCI prior to DC   - Off Amiodarone, off dobutamine  - c/w metoprolol  - Dr. Quintanilla following    [ ] DM  - ISS     []Scrotal abscess   - US (12/12): 2.1 x0.9 x 3.2 cm focal, right testicle- possible abscess (12/12)  - Urology recs: CT scrotum  - 12/23 US doppler scrotum: no arterial flow, limited venous flow, bl hydrocele  - 12/15 CT CAP no acute changes   - 12/15 CT CAP no acute changes

## 2024-12-25 NOTE — PROGRESS NOTE ADULT - SUBJECTIVE AND OBJECTIVE BOX
INTERVAL EVENTS:  - Failed SBT 2/2 tachycardia  - Midodrine added  - Lactate and trops increasing, continue to trend  - Continuing CRRT net even  - cardiac event > re called cardiology > rec argatroban gtt    [ ] Due to altered mental status/intubation, subjective information were not able to be obtained from the patient. History was obtained, to the extent possible, from review of the chart and collateral sources of information.    OBJECTIVE:    VITALS:  Vital Signs Last 24 Hrs  T(C): 37.3 (24 Dec 2024 23:00), Max: 37.7 (24 Dec 2024 15:00)  T(F): 99.1 (24 Dec 2024 23:00), Max: 99.9 (24 Dec 2024 15:00)  HR: 120 (25 Dec 2024 01:00) (105 - 126)  BP: 105/59 (25 Dec 2024 01:00) (51/16 - 152/66)  BP(mean): 69 (25 Dec 2024 01:00) (23 - 114)  RR: 29 (25 Dec 2024 01:00) (16 - 44)  SpO2: 100% (25 Dec 2024 01:00) (92% - 100%)    Parameters below as of 24 Dec 2024 23:00  Patient On (Oxygen Delivery Method): ventilator    O2 Concentration (%): 30  Mode: AC/ CMV (Assist Control/ Continuous Mandatory Ventilation)  RR (machine): 14  TV (machine): 480  FiO2: 30  PEEP: 5  ITime: 1  MAP: 8.7  PIP: 21    I&O's Summary    23 Dec 2024 07:01  -  24 Dec 2024 07:00  --------------------------------------------------------  IN: 2732.1 mL / OUT: 1836 mL / NET: 896.1 mL    24 Dec 2024 07:01  -  25 Dec 2024 01:46  --------------------------------------------------------  IN: 2484.2 mL / OUT: 2085 mL / NET: 399.2 mL        PHYSICAL EXAM:    NEURO  Exam:      RESPIRATORY  Exam:    Mechanical Ventilation: Mode: AC/ CMV (Assist Control/ Continuous Mandatory Ventilation), RR (machine): 14, RR (patient): 20, TV (machine): 480, FiO2: 30, PEEP: 5, ITime: 1, MAP: 8.7, PIP: 21    CARDIOVASCULAR  Exam:    Cardiac Rhythm:      GI/NUTRITION  Exam:    Diet:     GENITOURINARY  Exam:     ACCESS DEVICES:  [ ] Peripheral IV  [ ] Central Venous Line	[ ] R	[ ] L	[ ] IJ	[ ] Fem	[ ] SC	Placed:   [ ] Arterial Line		[ ] R	[ ] L	[ ] Fem	[ ] Rad	[ ] Ax	Placed:   [ ] PICC:					[ ] Mediport  [ ] Urinary Catheter, Date Placed:   [x] Necessity of urinary, arterial, and venous catheters discussed      LABS:                        9.2    11.76 )-----------( 38       ( 24 Dec 2024 13:57 )             25.9   12-24    133[L]  |  99  |  58[H]  ----------------------------<  150[H]  3.8   |  21[L]  |  1.17    Ca    7.7[L]      24 Dec 2024 20:04  Phos  3.4     12-24  Mg     2.3     12-24    TPro  4.0[L]  /  Alb  2.6[L]  /  TBili  10.7[H]  /  DBili  x   /  AST  236[H]  /  ALT  102[H]  /  AlkPhos  169[H]  12-24    CAPILLARY BLOOD GLUCOSE      POCT Blood Glucose.: 144 mg/dL (25 Dec 2024 00:18)  POCT Blood Glucose.: 157 mg/dL (24 Dec 2024 18:21)  POCT Blood Glucose.: 108 mg/dL (24 Dec 2024 13:36)  POCT Blood Glucose.: 88 mg/dL (24 Dec 2024 12:00)  POCT Blood Glucose.: 508 mg/dL (24 Dec 2024 11:58)  POCT Blood Glucose.: 107 mg/dL (24 Dec 2024 05:57)      MEDICATIONS:   MEDICATIONS  (STANDING):  argatroban Infusion 0.2 MICROgram(s)/kG/Min (1.19 mL/Hr) IV Continuous <Continuous>  aspirin  chewable 81 milliGRAM(s) Oral daily  atorvastatin 80 milliGRAM(s) Oral at bedtime  buDESOnide    Inhalation Suspension 0.5 milliGRAM(s) Inhalation every 12 hours  caspofungin IVPB 50 milliGRAM(s) IV Intermittent every 24 hours  caspofungin IVPB      chlorhexidine 0.12% Liquid 15 milliLiter(s) Oral Mucosa every 12 hours  chlorhexidine 2% Cloths 1 Application(s) Topical <User Schedule>  CRRT Treatment    <Continuous>  cycloSPORINE  , modified (GENGRAF) Solution 25 milliGRAM(s) Oral <User Schedule>  fentaNYL   Patch  12 MICROgram(s)/Hr. 1 Patch Transdermal every 48 hours  insulin lispro (ADMELOG) corrective regimen sliding scale   SubCutaneous every 6 hours  meropenem  IVPB 1000 milliGRAM(s) IV Intermittent every 12 hours  methylPREDNISolone sodium succinate Injectable 32 milliGRAM(s) IV Push <User Schedule>  midodrine 10 milliGRAM(s) Oral every 8 hours  minocycline IVPB      minocycline IVPB 200 milliGRAM(s) IV Intermittent every 12 hours  mupirocin 2% Ointment 1 Application(s) Topical every 12 hours  norepinephrine Infusion 0.07 MICROgram(s)/kG/Min (13.1 mL/Hr) IV Continuous <Continuous>  nystatin Powder 1 Application(s) Topical every 12 hours  pantoprazole  Injectable 40 milliGRAM(s) IV Push every 12 hours  PrismaSATE Dialysate BK 0 / 3.5 5000 milliLiter(s) (1500 mL/Hr) CRRT <Continuous>  PrismaSOL Filtration BGK 4 / 2.5 5000 milliLiter(s) (1250 mL/Hr) CRRT <Continuous>  PrismaSOL Filtration BGK 4 / 2.5 5000 milliLiter(s) (250 mL/Hr) CRRT <Continuous>  trimethoprim / sulfamethoxazole IVPB 390 milliGRAM(s) IV Intermittent every 12 hours  vasopressin Infusion 0.03 Unit(s)/Min (4.5 mL/Hr) IV Continuous <Continuous>    MEDICATIONS  (PRN):  albuterol/ipratropium for Nebulization 3 milliLiter(s) Nebulizer every 6 hours PRN Shortness of Breath and/or Wheezing   INTERVAL EVENTS:  - Failed SBT 2/2 tachycardia  - Midodrine added  - Lactate and trops increasing, continue to trend  - Continuing CRRT net even  - cardiac event > re called cardiology > rec argatroban gtt  - 5 metop IV x 1 for sustained HR in 120s       [ ] Due to altered mental status/intubation, subjective information were not able to be obtained from the patient. History was obtained, to the extent possible, from review of the chart and collateral sources of information.    OBJECTIVE:    VITALS:  Vital Signs Last 24 Hrs  T(C): 37.3 (24 Dec 2024 23:00), Max: 37.7 (24 Dec 2024 15:00)  T(F): 99.1 (24 Dec 2024 23:00), Max: 99.9 (24 Dec 2024 15:00)  HR: 120 (25 Dec 2024 01:00) (105 - 126)  BP: 105/59 (25 Dec 2024 01:00) (51/16 - 152/66)  BP(mean): 69 (25 Dec 2024 01:00) (23 - 114)  RR: 29 (25 Dec 2024 01:00) (16 - 44)  SpO2: 100% (25 Dec 2024 01:00) (92% - 100%)    Parameters below as of 24 Dec 2024 23:00  Patient On (Oxygen Delivery Method): ventilator    O2 Concentration (%): 30  Mode: AC/ CMV (Assist Control/ Continuous Mandatory Ventilation)  RR (machine): 14  TV (machine): 480  FiO2: 30  PEEP: 5  ITime: 1  MAP: 8.7  PIP: 21    I&O's Summary    23 Dec 2024 07:01  -  24 Dec 2024 07:00  --------------------------------------------------------  IN: 2732.1 mL / OUT: 1836 mL / NET: 896.1 mL    24 Dec 2024 07:01  -  25 Dec 2024 01:46  --------------------------------------------------------  IN: 2484.2 mL / OUT: 2085 mL / NET: 399.2 mL        PHYSICAL EXAM:    NEURO  Exam:      RESPIRATORY  Exam:    Mechanical Ventilation: Mode: AC/ CMV (Assist Control/ Continuous Mandatory Ventilation), RR (machine): 14, RR (patient): 20, TV (machine): 480, FiO2: 30, PEEP: 5, ITime: 1, MAP: 8.7, PIP: 21    CARDIOVASCULAR  Exam:    Cardiac Rhythm:      GI/NUTRITION  Exam:    Diet:     GENITOURINARY  Exam:     ACCESS DEVICES:  [ ] Peripheral IV  [ ] Central Venous Line	[ ] R	[ ] L	[ ] IJ	[ ] Fem	[ ] SC	Placed:   [ ] Arterial Line		[ ] R	[ ] L	[ ] Fem	[ ] Rad	[ ] Ax	Placed:   [ ] PICC:					[ ] Mediport  [ ] Urinary Catheter, Date Placed:   [x] Necessity of urinary, arterial, and venous catheters discussed      LABS:                        9.2    11.76 )-----------( 38       ( 24 Dec 2024 13:57 )             25.9   12-24    133[L]  |  99  |  58[H]  ----------------------------<  150[H]  3.8   |  21[L]  |  1.17    Ca    7.7[L]      24 Dec 2024 20:04  Phos  3.4     12-24  Mg     2.3     12-24    TPro  4.0[L]  /  Alb  2.6[L]  /  TBili  10.7[H]  /  DBili  x   /  AST  236[H]  /  ALT  102[H]  /  AlkPhos  169[H]  12-24    CAPILLARY BLOOD GLUCOSE      POCT Blood Glucose.: 144 mg/dL (25 Dec 2024 00:18)  POCT Blood Glucose.: 157 mg/dL (24 Dec 2024 18:21)  POCT Blood Glucose.: 108 mg/dL (24 Dec 2024 13:36)  POCT Blood Glucose.: 88 mg/dL (24 Dec 2024 12:00)  POCT Blood Glucose.: 508 mg/dL (24 Dec 2024 11:58)  POCT Blood Glucose.: 107 mg/dL (24 Dec 2024 05:57)      MEDICATIONS:   MEDICATIONS  (STANDING):  argatroban Infusion 0.2 MICROgram(s)/kG/Min (1.19 mL/Hr) IV Continuous <Continuous>  aspirin  chewable 81 milliGRAM(s) Oral daily  atorvastatin 80 milliGRAM(s) Oral at bedtime  buDESOnide    Inhalation Suspension 0.5 milliGRAM(s) Inhalation every 12 hours  caspofungin IVPB 50 milliGRAM(s) IV Intermittent every 24 hours  caspofungin IVPB      chlorhexidine 0.12% Liquid 15 milliLiter(s) Oral Mucosa every 12 hours  chlorhexidine 2% Cloths 1 Application(s) Topical <User Schedule>  CRRT Treatment    <Continuous>  cycloSPORINE  , modified (GENGRAF) Solution 25 milliGRAM(s) Oral <User Schedule>  fentaNYL   Patch  12 MICROgram(s)/Hr. 1 Patch Transdermal every 48 hours  insulin lispro (ADMELOG) corrective regimen sliding scale   SubCutaneous every 6 hours  meropenem  IVPB 1000 milliGRAM(s) IV Intermittent every 12 hours  methylPREDNISolone sodium succinate Injectable 32 milliGRAM(s) IV Push <User Schedule>  midodrine 10 milliGRAM(s) Oral every 8 hours  minocycline IVPB      minocycline IVPB 200 milliGRAM(s) IV Intermittent every 12 hours  mupirocin 2% Ointment 1 Application(s) Topical every 12 hours  norepinephrine Infusion 0.07 MICROgram(s)/kG/Min (13.1 mL/Hr) IV Continuous <Continuous>  nystatin Powder 1 Application(s) Topical every 12 hours  pantoprazole  Injectable 40 milliGRAM(s) IV Push every 12 hours  PrismaSATE Dialysate BK 0 / 3.5 5000 milliLiter(s) (1500 mL/Hr) CRRT <Continuous>  PrismaSOL Filtration BGK 4 / 2.5 5000 milliLiter(s) (1250 mL/Hr) CRRT <Continuous>  PrismaSOL Filtration BGK 4 / 2.5 5000 milliLiter(s) (250 mL/Hr) CRRT <Continuous>  trimethoprim / sulfamethoxazole IVPB 390 milliGRAM(s) IV Intermittent every 12 hours  vasopressin Infusion 0.03 Unit(s)/Min (4.5 mL/Hr) IV Continuous <Continuous>    MEDICATIONS  (PRN):  albuterol/ipratropium for Nebulization 3 milliLiter(s) Nebulizer every 6 hours PRN Shortness of Breath and/or Wheezing   INTERVAL EVENTS:  - Failed SBT 2/2 tachycardia  - Midodrine added  - Lactate and trops increasing, continue to trend  - Continuing CRRT net even  - cardiac event > re called cardiology > rec argatroban gtt  - 5 metop IV x 1 for sustained HR in 120s, restarted PO metop 12.5 BID       [ ] Due to altered mental status/intubation, subjective information were not able to be obtained from the patient. History was obtained, to the extent possible, from review of the chart and collateral sources of information.    OBJECTIVE:    VITALS:  Vital Signs Last 24 Hrs  T(C): 37.3 (24 Dec 2024 23:00), Max: 37.7 (24 Dec 2024 15:00)  T(F): 99.1 (24 Dec 2024 23:00), Max: 99.9 (24 Dec 2024 15:00)  HR: 120 (25 Dec 2024 01:00) (105 - 126)  BP: 105/59 (25 Dec 2024 01:00) (51/16 - 152/66)  BP(mean): 69 (25 Dec 2024 01:00) (23 - 114)  RR: 29 (25 Dec 2024 01:00) (16 - 44)  SpO2: 100% (25 Dec 2024 01:00) (92% - 100%)    Parameters below as of 24 Dec 2024 23:00  Patient On (Oxygen Delivery Method): ventilator    O2 Concentration (%): 30  Mode: AC/ CMV (Assist Control/ Continuous Mandatory Ventilation)  RR (machine): 14  TV (machine): 480  FiO2: 30  PEEP: 5  ITime: 1  MAP: 8.7  PIP: 21    I&O's Summary    23 Dec 2024 07:01  -  24 Dec 2024 07:00  --------------------------------------------------------  IN: 2732.1 mL / OUT: 1836 mL / NET: 896.1 mL    24 Dec 2024 07:01  -  25 Dec 2024 01:46  --------------------------------------------------------  IN: 2484.2 mL / OUT: 2085 mL / NET: 399.2 mL        PHYSICAL EXAM:    NEURO  Exam:      RESPIRATORY  Exam:    Mechanical Ventilation: Mode: AC/ CMV (Assist Control/ Continuous Mandatory Ventilation), RR (machine): 14, RR (patient): 20, TV (machine): 480, FiO2: 30, PEEP: 5, ITime: 1, MAP: 8.7, PIP: 21    CARDIOVASCULAR  Exam:    Cardiac Rhythm:      GI/NUTRITION  Exam:    Diet:     GENITOURINARY  Exam:     ACCESS DEVICES:  [ ] Peripheral IV  [ ] Central Venous Line	[ ] R	[ ] L	[ ] IJ	[ ] Fem	[ ] SC	Placed:   [ ] Arterial Line		[ ] R	[ ] L	[ ] Fem	[ ] Rad	[ ] Ax	Placed:   [ ] PICC:					[ ] Mediport  [ ] Urinary Catheter, Date Placed:   [x] Necessity of urinary, arterial, and venous catheters discussed      LABS:                        9.2    11.76 )-----------( 38       ( 24 Dec 2024 13:57 )             25.9   12-24    133[L]  |  99  |  58[H]  ----------------------------<  150[H]  3.8   |  21[L]  |  1.17    Ca    7.7[L]      24 Dec 2024 20:04  Phos  3.4     12-24  Mg     2.3     12-24    TPro  4.0[L]  /  Alb  2.6[L]  /  TBili  10.7[H]  /  DBili  x   /  AST  236[H]  /  ALT  102[H]  /  AlkPhos  169[H]  12-24    CAPILLARY BLOOD GLUCOSE      POCT Blood Glucose.: 144 mg/dL (25 Dec 2024 00:18)  POCT Blood Glucose.: 157 mg/dL (24 Dec 2024 18:21)  POCT Blood Glucose.: 108 mg/dL (24 Dec 2024 13:36)  POCT Blood Glucose.: 88 mg/dL (24 Dec 2024 12:00)  POCT Blood Glucose.: 508 mg/dL (24 Dec 2024 11:58)  POCT Blood Glucose.: 107 mg/dL (24 Dec 2024 05:57)      MEDICATIONS:   MEDICATIONS  (STANDING):  argatroban Infusion 0.2 MICROgram(s)/kG/Min (1.19 mL/Hr) IV Continuous <Continuous>  aspirin  chewable 81 milliGRAM(s) Oral daily  atorvastatin 80 milliGRAM(s) Oral at bedtime  buDESOnide    Inhalation Suspension 0.5 milliGRAM(s) Inhalation every 12 hours  caspofungin IVPB 50 milliGRAM(s) IV Intermittent every 24 hours  caspofungin IVPB      chlorhexidine 0.12% Liquid 15 milliLiter(s) Oral Mucosa every 12 hours  chlorhexidine 2% Cloths 1 Application(s) Topical <User Schedule>  CRRT Treatment    <Continuous>  cycloSPORINE  , modified (GENGRAF) Solution 25 milliGRAM(s) Oral <User Schedule>  fentaNYL   Patch  12 MICROgram(s)/Hr. 1 Patch Transdermal every 48 hours  insulin lispro (ADMELOG) corrective regimen sliding scale   SubCutaneous every 6 hours  meropenem  IVPB 1000 milliGRAM(s) IV Intermittent every 12 hours  methylPREDNISolone sodium succinate Injectable 32 milliGRAM(s) IV Push <User Schedule>  midodrine 10 milliGRAM(s) Oral every 8 hours  minocycline IVPB      minocycline IVPB 200 milliGRAM(s) IV Intermittent every 12 hours  mupirocin 2% Ointment 1 Application(s) Topical every 12 hours  norepinephrine Infusion 0.07 MICROgram(s)/kG/Min (13.1 mL/Hr) IV Continuous <Continuous>  nystatin Powder 1 Application(s) Topical every 12 hours  pantoprazole  Injectable 40 milliGRAM(s) IV Push every 12 hours  PrismaSATE Dialysate BK 0 / 3.5 5000 milliLiter(s) (1500 mL/Hr) CRRT <Continuous>  PrismaSOL Filtration BGK 4 / 2.5 5000 milliLiter(s) (1250 mL/Hr) CRRT <Continuous>  PrismaSOL Filtration BGK 4 / 2.5 5000 milliLiter(s) (250 mL/Hr) CRRT <Continuous>  trimethoprim / sulfamethoxazole IVPB 390 milliGRAM(s) IV Intermittent every 12 hours  vasopressin Infusion 0.03 Unit(s)/Min (4.5 mL/Hr) IV Continuous <Continuous>    MEDICATIONS  (PRN):  albuterol/ipratropium for Nebulization 3 milliLiter(s) Nebulizer every 6 hours PRN Shortness of Breath and/or Wheezing

## 2024-12-25 NOTE — PROGRESS NOTE ADULT - SUBJECTIVE AND OBJECTIVE BOX
Subjective  Patient not able to participate in exam.     Objective    Vital signs  T(F): , Max: 100.4 (12-25-24 @ 09:30)  HR: 101 (12-25-24 @ 19:00)  BP: 118/53 (12-25-24 @ 14:30)  SpO2: 100% (12-25-24 @ 19:00)  Wt(kg): --    Output     OUT:    Bulb (mL): 10 mL    Bulb (mL): 15 mL    Indwelling Catheter - Urethral (mL): 60 mL    VAC (Vacuum Assisted Closure) System (mL): 0 mL  Total OUT: 85 mL    Total NET: -85 mL      OUT:    Bulb (mL): 5 mL    Bulb (mL): 45 mL    Indwelling Catheter - Urethral (mL): 5 mL    VAC (Vacuum Assisted Closure) System (mL): 0 mL  Total OUT: 55 mL    Total NET: -55 mL          Gen: NAD  Abd: soft, nontender, nondistended  : Scrotum less edematous, less red, with persistent layer of brown layer of film over scrotal skin. Around the edges of the brown film can see healthy erythematous tissue. No crepitus appreciated over scrotal skin.     Labs      12-25 @ 18:20    WBC 15.09 / Hct 22.1  / SCr 0.73     12-25 @ 12:44    WBC 15.02 / Hct 27.0  / SCr 0.81         Culture - Blood (collected 12-24-24 @ 03:30)  Source: .Blood BLOOD  Preliminary Report (12-25-24 @ 07:01):    No growth at 24 hours    Culture - Bronchial (collected 12-23-24 @ 16:37)  Source: Combi-Cath  Gram Stain (12-23-24 @ 22:50):    No polymorphonuclear cells seen per low power field    No squamous epithelial cells per low power field    No organisms seen per oil power field  Final Report (12-25-24 @ 08:06):    Commensal charity consistent with body site    Culture - Blood (collected 12-23-24 @ 16:03)  Source: .Blood BLOOD  Preliminary Report (12-25-24 @ 20:01):    No growth at 48 Hours    Culture - Body Fluid with Gram Stain (collected 12-20-24 @ 18:23)  Source: Abdominal Fl  Gram Stain (12-21-24 @ 05:56):    polymorphonuclear leukocytes seen    Gram positive cocci in pairs seen    by cytocentrifuge  Preliminary Report (12-21-24 @ 21:12):    Rare Enterococcus faecalis  Organism: Enterococcus faecalis (12-22-24 @ 19:26)  Organism: Enterococcus faecalis (12-22-24 @ 19:26)      Method Type: ESAU      -  Ampicillin: S <=2 Predicts results to ampicillin/sulbactam, amoxacillin-clavulanate and  piperacillin-tazobactam.      -  Vancomycin: S 1    Culture - Sputum (collected 12-19-24 @ 14:31)  Source: Trach Asp Tracheal Aspirate  Gram Stain (12-20-24 @ 00:18):    Moderate polymorphonuclear leukocytes per low power field    Rare Squamous epithelial cells per low power field    Numerous Gram Negative Rods per oil power field    Numerous Yeast like cells per oil power field  Final Report (12-21-24 @ 14:57):    Moderate Stenotrophomonas maltophilia    Commensal charity consistent with body site  Organism: Stenotrophomonas maltophilia (12-21-24 @ 14:57)  Organism: Stenotrophomonas maltophilia (12-21-24 @ 14:57)      Method Type: KB      -  Minocycline: S  Organism: Stenotrophomonas maltophilia (12-21-24 @ 14:57)      Method Type: ESAU      -  Levofloxacin: S <=0.5      -  Trimethoprim/Sulfamethoxazole: S <=0.5/9.5    Culture - Blood (collected 12-19-24 @ 03:28)  Source: .Blood BLOOD  Final Report (12-24-24 @ 07:00):    No growth at 5 days    Culture - Blood (collected 12-19-24 @ 03:28)  Source: .Blood BLOOD  Final Report (12-24-24 @ 07:00):    No growth at 5 days        Urine Cx: ?  Blood Cx: ?    Imaging

## 2024-12-25 NOTE — CHART NOTE - NSCHARTNOTEFT_GEN_A_CORE
NUTRITION FOLLOW UP NOTE    PATIENT SEEN FOR: sicu follow up     SOURCE: [] Patient  [x] Current Medical Record  [x] RN  [] Family/support person at bedside  [x] Patient unavailable/inappropriate  [x] Other: Team     CHART REVIEWED/EVENTS NOTED.  [x] Nutrition Status:  -S/p OLT 11/15; POD #40  -S/p Trach    -Failed SBT   -LFTs uptrending   -CRRT restarted     DIET ORDER:   Diet, NPO with Tube Feed:   Tube Feeding Modality: Nasogastric  Nepro with Carb Steady (NEPRORTH)  Total Volume for 24 Hours (mL): 1320  Continuous  Starting Tube Feed Rate {mL per Hour}: 30  Increase Tube Feed Rate by (mL): 10     Every 4 hours  Until Goal Tube Feed Rate (mL per Hour): 55  Tube Feed Duration (in Hours): 24  Tube Feed Start Time: 20:00 (24)    ENTERAL NUTRITION  EN Order Provides: 1320mL of formula, 2336kcal/day (29kcal/kg), 107g protein/day (1.3g/kg), 960mL free water - based on 80.7kg     Current Pump Rate:  EN provision: 87% EN volume goal provided in past x5days     ANTHROPOMETRICS:  Drug Dosing Weight  Height (cm): 182.9 (09 Dec 2024 10:11)  Weight (kg): 99.5 (25 Dec 2024 00:00)  BMI (kg/m2): 29.7 (25 Dec 2024 00:00)  BSA (m2): 2.22 (25 Dec 2024 00:00)  Weights:   Daily Weight in k.5 (12-25), Weight in k (12-21), Weight in k (12-21)   -9% weight loss x 5 days; ? true muscle/fat loss vs fluid shifts     NUTRITIONALLY PERTINENT MEDICATIONS:  MEDICATIONS  (STANDING):  albumin human 25% IVPB  atorvastatin  caspofungin IVPB  caspofungin IVPB  insulin lispro (ADMELOG) corrective regimen sliding scale  meropenem  IVPB  methylPREDNISolone sodium succinate Injectable  midodrine  minocycline IVPB  minocycline IVPB  norepinephrine Infusion  pantoprazole  Injectable  phenylephrine    Infusion  vasopressin Infusion       NUTRITIONALLY PERTINENT LABS:   Na134 mmol/L[L] Glu 96 mg/dL K+ 3.5 mmol/L Cr  0.81 mg/dL BUN 38 mg/dL[H]  Phos 3.1 mg/dL  Alb 2.8 g/dL[L]-  U/L[H]  U/L[H] Alkaline Phosphatase 200 U/L[H]            Finger Sticks:  POCT Blood Glucose.: 97 mg/dL ( @ 12:15)  POCT Blood Glucose.: 148 mg/dL ( @ 06:31)  POCT Blood Glucose.: 144 mg/dL ( @ 00:18)  POCT Blood Glucose.: 157 mg/dL ( @ 18:21)      NUTRITIONALLY PERTINENT MEDICATIONS/LABS:  [x] Reviewed  [x] Relevant notes on medications/labs:  -Levo/Vaso/Aldo  -Cyclosporine   -Solu-medrol   -Sliding scale insulin   -Albumin     EDEMA:  [x] Reviewed  [] Relevant notes:    GI/ I&O:  [x] Reviewed  -775cc out via ostomy ()     SKIN:   [x] Pressure injury previously noted    ESTIMATED NEEDS:  [x] No change:  Energy:  2178-2582kcal/day (27-32 kcal/kg)  Protein:  97-121g/day (1.2-1.5 g/kg)  Fluid:   ml/day or [X] defer to team  Based on: ideal body weight of  80.7kg     NUTRITION DIAGNOSIS:  [X] Prior Dx:  1. Increased protein-energy needs   2. Severe malnutrition (Acute)    EDUCATION:  [] Yes:  [x] Not appropriate/warranted    NUTRITION CARE PLAN:  1. Diet: defer diet to Team in setting of pressor use   -As medically feasible, recommend continuing Nepro @ 55mL x 24hrs providing 1320mL of formula, 2336kcal/day (29kcal/kg), 107g protein/day (1.3g/kg), 960mL free water - based on  IBW 80.7kg   -Monitor pressor status and ostomy output; Spoke to tommy SCHULZ to continue with Nepro at this time    2. Supplements: N/A  3. Multivitamin/mineral supplementation: Nephro-kole    MONITORING AND EVALUATION:   RD remains available upon request and will follow up per protocol.    Malorie Pike, MS, RDN, CDN (Teams)   Available on [place] TEAMS

## 2024-12-25 NOTE — PROGRESS NOTE ADULT - ASSESSMENT
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 Post op course complicated by acute hypoxic respiratory failure requiring intubation multiple times, most recently on 12/7 with conversion to tracheostomy on 12/17, e.coli bacteremia with RTOR on 12/7 for concern for worsening septic shock and cardiogenic shock s/p balloon pump placement and total abdominal colectomy in setting of ischemic bowel s/p OR on 12/9 for ileostomy creation, and olirugirc MORAIMA requiring CRRT and now intermittent HD.    Diagnosed with pneumonia secondary to Stenotrophomonas    Also with growth of Enterococcus faecalis from abdominal drains and urine culture    More fever and shock event on 12/23/24    UA (12/19) 2 WBC  BCx (12/23) NGTD  Bronch Cx (12/23) NGTD    CT Chest (12/23) Bibasilar groundglass and tree in bud opacities which may represent distal airway impaction versus pneumonia.    CT A/P (12/23) Peripheral wedge-shaped areas of hypoattenuation involving the superior aspect of the spleen, suggestive of splenic infarcts (12-59). Mildly dilated loops of proximal small bowel without transition point, likely ileus.    Testicular US (12/23) No appreciable arterial flow with very limited venous flow in in the testes bilaterally. Interval decrease in diffuse scrotal edema. Small bilateral hydroceles.    Antibiotic Course:  Meropenem ( 11/22-11/29, 11/22-11/29, 12/16-)   Minocycline (12/21-)  Bactrim (11/16-11/24, 12/8-12/11, 12/21-)  Fluconazole (11/16-12/2, 12/12-12/16)   Caspofungin ( 12/6-12/11, 12/17-)  Cefepime (12/10-12/16)   Metronidazole (12/10-12/14)   Ganciclovir (12/7-12/13)   Linezolid (11/23-11/26, 12/6-12/11)   Atovaquone (11/29-12/6)   Zosyn (11/20-11/22,12/6)   Tobramycin (12/6)   Valganciclovir (11/6-12/4)    #Leukocytosis, Fever, Shock, Transaminitis, Stenotrophomonas Pneumonia  --If no acute events tomorrow, continued improvement in secretions would stop Bactrim tomorrow (double Stenotrophomonas coverage)  --Recommend CT Pelvis (to further characterize scrotal contents)  --Can redose Vancomycin today pending CT scan findings  --Continue Minocycline 200 mg IV Q12H  --Continue Meropenem 1g IV Q12H  --Continue Caspofungin for intra-abdominal findings    #Liver Transplant Recipient, Prophylactic Antibiotic  --Repeat CMV PCR on 12/30  --Once IV treatment dose Bactrim stopped would switch to PO Bactrim for PCP PPx    Wes Carrero MD  Can be called via Teams  After 5pm/weekends [phone]      The above assessment and plan were discussed with SICU Team

## 2024-12-26 NOTE — PROCEDURE NOTE - PRACTITIONER PERFORMING THE TIME OUT
Roxana De La Fuente PA-C
Toy Vogel
MARY Galindo
PA Martin
PA aMrtin

## 2024-12-26 NOTE — PRE PROCEDURE NOTE - PRE PROCEDURE EVALUATION
Pre-Endoscopy Evaluation    Attending Physician:  Dr. Radha Hidalgo    Procedure: EGD    Indication for Procedure & Pertinent History: Blood per feeding tube    PAST MEDICAL & SURGICAL HISTORY:  Diabetes      Transaminitis      Paroxysmal atrial fibrillation      Depression      BPH (benign prostatic hyperplasia)      Hypertension      Chronic atrial fibrillation      Coronary artery disease      Hepatocellular carcinoma      DM (diabetes mellitus)      HTN (hypertension)      Paroxysmal atrial fibrillation      Cirrhosis      HCC (hepatocellular carcinoma)      History of BPH      History of laparoscopic cholecystectomy      History of lumbar laminectomy      H/O prior ablation treatment      H/O percutaneous left heart catheterization          Allergies    No Known Allergies    Intolerances        Medications: MEDICATIONS  (STANDING):  albumin human  5% IVPB 500 milliLiter(s) IV Intermittent every 8 hours  buDESOnide    Inhalation Suspension 0.5 milliGRAM(s) Inhalation every 12 hours  calcium gluconate IVPB 2 Gram(s) IV Intermittent once  caspofungin IVPB      caspofungin IVPB 50 milliGRAM(s) IV Intermittent every 24 hours  chlorhexidine 0.12% Liquid 15 milliLiter(s) Oral Mucosa every 12 hours  chlorhexidine 2% Cloths 1 Application(s) Topical <User Schedule>  CRRT Treatment    <Continuous>  dextrose 10% + sodium chloride 0.9%. 1000 milliLiter(s) (50 mL/Hr) IV Continuous <Continuous>  fentaNYL   Patch  12 MICROgram(s)/Hr. 1 Patch Transdermal every 48 hours  meropenem  IVPB 1000 milliGRAM(s) IV Intermittent every 12 hours  methylPREDNISolone sodium succinate Injectable 32 milliGRAM(s) IV Push <User Schedule>  minocycline IVPB      minocycline IVPB 200 milliGRAM(s) IV Intermittent every 12 hours  mupirocin 2% Ointment 1 Application(s) Topical every 12 hours  norepinephrine Infusion 0.05 MICROgram(s)/kG/Min (9.33 mL/Hr) IV Continuous <Continuous>  nystatin Powder 1 Application(s) Topical every 12 hours  pantoprazole Infusion 8 mG/Hr (10 mL/Hr) IV Continuous <Continuous>  phenylephrine    Infusion 0.1 MICROgram(s)/kG/Min (1.87 mL/Hr) IV Continuous <Continuous>  PrismaSATE Dialysate BGK 4 / 2.5 5000 milliLiter(s) (1500 mL/Hr) CRRT <Continuous>  PrismaSOL Filtration BGK 4 / 2.5 5000 milliLiter(s) (1250 mL/Hr) CRRT <Continuous>  PrismaSOL Filtration BGK 4 / 2.5 5000 milliLiter(s) (250 mL/Hr) CRRT <Continuous>  propofol Infusion 50 MICROgram(s)/kG/Min (29.9 mL/Hr) IV Continuous <Continuous>  trimethoprim / sulfamethoxazole IVPB 390 milliGRAM(s) IV Intermittent every 12 hours  vasopressin Infusion 0.03 Unit(s)/Min (4.5 mL/Hr) IV Continuous <Continuous>    MEDICATIONS  (PRN):  albuterol/ipratropium for Nebulization 3 milliLiter(s) Nebulizer every 6 hours PRN Shortness of Breath and/or Wheezing      Pertinent lab data:                        7.6    9.86  )-----------( 31       ( 26 Dec 2024 12:33 )             20.6     12-26    135  |  101  |  27[H]  ----------------------------<  170[H]  3.4[L]   |  21[L]  |  0.54    Ca    7.8[L]      26 Dec 2024 07:06  Phos  2.3     12-26  Mg     2.4     12-26    TPro  4.1[L]  /  Alb  3.1[L]  /  TBili  12.2[H]  /  DBili  x   /  AST  2373[H]  /  ALT  660[H]  /  AlkPhos  177[H]  12-26    PT/INR - ( 26 Dec 2024 07:06 )   PT: 33.6 sec;   INR: 2.95 ratio         PTT - ( 26 Dec 2024 07:06 )  PTT:80.6 sec            Physical Examination:  Daily     Daily   Vital Signs Last 24 Hrs  T(C): 36.4 (26 Dec 2024 11:00), Max: 37.7 (25 Dec 2024 15:00)  T(F): 97.5 (26 Dec 2024 11:00), Max: 99.9 (25 Dec 2024 15:00)  HR: 93 (26 Dec 2024 12:30) (89 - 122)  BP: 107/53 (26 Dec 2024 07:00) (67/40 - 118/53)  BP(mean): 76 (26 Dec 2024 07:00) (47 - 89)  RR: 26 (26 Dec 2024 12:30) (15 - 51)  SpO2: 100% (26 Dec 2024 12:30) (94% - 100%)    Parameters below as of 26 Dec 2024 11:00  Patient On (Oxygen Delivery Method): ventilator      GENERAL: no acute distress. Intubated  CHEST: no respiratory distress, no accessory muscle use  CARDIAC: regular rate, +S1/S2  ABDOMEN: soft  EXTREMITIES: warm, well perfused  SKIN: no lesions noted    Comments:    ASA Class: I []  II []  III []  IV [x]    The patient is a suitable candidate for the planned procedure unless box checked [ ]  No, explain:    Note incomplete until finalized by attending signature/attestation.    El Cruz MD   Gastroenterology/Hepatology Fellow PGY-5  Available on Microsoft Teams 7am - 5pm  [id number]

## 2024-12-26 NOTE — PROGRESS NOTE ADULT - ASSESSMENT
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 Post op course complicated by acute hypoxic respiratory failure requiring intubation multiple times, most recently on 12/7 with conversion to tracheostomy on 12/17, e.coli bacteremia with RTOR on 12/7 for concern for worsening septic shock and cardiogenic shock s/p balloon pump placement and total abdominal colectomy in setting of ischemic bowel s/p OR on 12/9 for ileostomy creation, and olirugirc MORAIMA requiring CRRT and now intermittent HD.    Diagnosed with pneumonia secondary to Stenotrophomonas    Also with growth of Enterococcus faecalis from abdominal drains and urine culture    More fever and shock event on 12/23/24    UA (12/19) 2 WBC  BCx (12/23) NGTD  Bronch Cx (12/23) NGTD    CT Chest (12/23) Bibasilar groundglass and tree in bud opacities which may represent distal airway impaction versus pneumonia.    CT A/P (12/23) Peripheral wedge-shaped areas of hypoattenuation involving the superior aspect of the spleen, suggestive of splenic infarcts (12-59). Mildly dilated loops of proximal small bowel without transition point, likely ileus.    Testicular US (12/23) No appreciable arterial flow with very limited venous flow in in the testes bilaterally. Interval decrease in diffuse scrotal edema. Small bilateral hydroceles.    CT Pelvis (12/25) Moderate diffuse subcutaneous/scrotal edema without defined collection or subcutaneous air.    Shock state overnight in context of likely GIB    Antibiotic Course:  Meropenem ( 11/22-11/29, 11/22-11/29, 12/16-)   Minocycline (12/21-)  Bactrim (11/16-11/24, 12/8-12/11, 12/21-)  Fluconazole (11/16-12/2, 12/12-12/16)   Caspofungin ( 12/6-12/11, 12/17-)  Cefepime (12/10-12/16)   Metronidazole (12/10-12/14)   Ganciclovir (12/7-12/13)   Linezolid (11/23-11/26, 12/6-12/11)   Atovaquone (11/29-12/6)   Zosyn (11/20-11/22,12/6)   Tobramycin (12/6)   Valganciclovir (11/6-12/4)    #Leukocytosis, Fever, Shock, Transaminitis, Stenotrophomonas Pneumonia  --If further worsening shock overnight would repeat blood cultures and administer Tobramycin 600 mg IV x1 (7 mg/kg based on adjusted body weight).   --If no acute events tomorrow, continued improvement in secretions would stop Bactrim tomorrow (double Stenotrophomonas coverage)  --Continue Minocycline 200 mg IV Q12H  --Continue Meropenem 1g IV Q12H  --Doubt need for Caspofungin; no growth of fungal organisms    #Liver Transplant Recipient, Prophylactic Antibiotic  --Repeat CMV PCR on 12/30  --Once IV treatment dose Bactrim stopped would switch to PO Bactrim for PCP PPx    I will continue to follow. Please feel free to contact me with any further questions.    Kyle Junior M.D.  [facility] Division of Infectious Disease  8AM-5PM Monday - Friday: Available on Microsoft Teams  After Hours and Holidays (or if no response on Microsoft Teams): Please contact the Infectious Diseases Office at [phone]    The above assessment and plan were discussed with SICU Team

## 2024-12-26 NOTE — PROGRESS NOTE ADULT - ATTENDING COMMENTS
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 Patient underwent OLT on 11/15/2024 with complicated post-op course including E.coli bacteremia (12/6), cardiogenic shock requiring IAPB on 12/7, colectomy on 12/7 with RTOR for closure and ileostomy creation  IAPB was removed on 12/10, s/p trached and PEG with course complicated by bleed while on AC requiring transfusion support. Hematology was consulted for thrombocytopenia.  Thrombocytopenia likely multifactorial in setting of critical illness/bactermia, medications (pressors/ antibiotics) GONZALES cirrhosis with portal hypertension, post-OLT complication, IABP induced platelet destruction from high shear forces.     Plan-  I have reviewed and agree with recommendations of Hematology fellow as documented above.  - No role for PLEX or IVIG given etiology of thrombocytopenia  is not immune mediated or microangiopathic in nature  - Etiology of thrombocytopenia is multifactorial as described above: sepsis, shock, IABP   - Risks vs benefit discussion regarding restarting AC given recurrent bleeding risk vs increased thrombosis risk.   - High bleed risk for DAPT therapy, continue with care per primary team

## 2024-12-26 NOTE — PROGRESS NOTE ADULT - SUBJECTIVE AND OBJECTIVE BOX
Cardiology Progress Note  ------------------------------------------------------------------------------------------  SUBJECTIVE:   - Tele: NSR  - In interval period had noted drop in H+H requiring 1u pRBC transfusion. Bloody output noted from NGT, GI re-called and endocsopy is pending  - Phenylepherine added, levophed being weaned    -------------------------------------------------------------------------------------------  VS:  T(F): 97.5 (12-26), Max: 99.9 (12-25)  HR: 94 (12-26) (92 - 122)  BP: 107/53 (12-26) (67/40 - 177/140)  RR: 26 (12-26)  SpO2: 100% (12-26)  I&O's Summary    25 Dec 2024 07:01  -  26 Dec 2024 07:00  --------------------------------------------------------  IN: 6569.1 mL / OUT: 4957 mL / NET: 1612.1 mL    26 Dec 2024 07:01  -  26 Dec 2024 11:07  --------------------------------------------------------  IN: 1349.5 mL / OUT: 476 mL / NET: 873.5 mL      PHYSICAL EXAM:    NEURO  Exam:  A&Ox0, intermittently opens eyes  RESPIRATORY  Exam:  nonlabored breathing on vent  Mechanical Ventilation: Mode: AC/ CMV (Assist Control/ Continuous Mandatory Ventilation), RR (machine): 14, RR (patient): 32, TV (machine): 480, FiO2: 30, PEEP: 5, ITime: 0.8, MAP: 9.2, PIP: 17  CARDIOVASCULAR  Exam:  poorly perfused, requiring vasopressor support  Cardiac Rhythm:  RRR  GI/NUTRITION  Exam:  nondistended, soft, NGT to suction    Exam: superificial necrosis of scrotum, nichols in place    -------------------------------------------------------------------------------------------  LABS:                          8.2    13.63 )-----------( 24       ( 26 Dec 2024 07:06 )             23.7     12-26    135  |  101  |  27[H]  ----------------------------<  170[H]  3.4[L]   |  21[L]  |  0.54    Ca    7.8[L]      26 Dec 2024 07:06  Phos  2.3     12-26  Mg     2.4     12-26    TPro  4.1[L]  /  Alb  3.1[L]  /  TBili  12.2[H]  /  DBili  x   /  AST  2373[H]  /  ALT  660[H]  /  AlkPhos  177[H]  12-26    PT/INR - ( 26 Dec 2024 07:06 )   PT: 33.6 sec;   INR: 2.95 ratio         PTT - ( 26 Dec 2024 07:06 )  PTT:80.6 sec  CARDIAC MARKERS ( 26 Dec 2024 07:06 )  1909 ng/L / x     / x     / x     / x     / x      CARDIAC MARKERS ( 26 Dec 2024 04:08 )  1849 ng/L / x     / x     / x     / x     / x      CARDIAC MARKERS ( 26 Dec 2024 01:18 )  1904 ng/L / x     / x     / x     / x     / x      CARDIAC MARKERS ( 25 Dec 2024 22:23 )  1828 ng/L / x     / x     / x     / x     / x      CARDIAC MARKERS ( 25 Dec 2024 12:44 )  1808 ng/L / x     / x     / x     / x     / x                -------------------------------------------------------------------------------------------  Meds:  albumin human  5% IVPB 500 milliLiter(s) IV Intermittent every 8 hours  albuterol/ipratropium for Nebulization 3 milliLiter(s) Nebulizer every 6 hours PRN  buDESOnide    Inhalation Suspension 0.5 milliGRAM(s) Inhalation every 12 hours  caspofungin IVPB      caspofungin IVPB 50 milliGRAM(s) IV Intermittent every 24 hours  chlorhexidine 0.12% Liquid 15 milliLiter(s) Oral Mucosa every 12 hours  chlorhexidine 2% Cloths 1 Application(s) Topical <User Schedule>  CRRT Treatment    <Continuous>  dextrose 10% + sodium chloride 0.9%. 1000 milliLiter(s) IV Continuous <Continuous>  erythromycin   IVPB 500 milliGRAM(s) IV Intermittent once  fentaNYL   Patch  12 MICROgram(s)/Hr. 1 Patch Transdermal every 48 hours  magnesium sulfate  IVPB 2 Gram(s) IV Intermittent once  meropenem  IVPB 1000 milliGRAM(s) IV Intermittent every 12 hours  methylPREDNISolone sodium succinate Injectable 32 milliGRAM(s) IV Push <User Schedule>  minocycline IVPB      minocycline IVPB 200 milliGRAM(s) IV Intermittent every 12 hours  mupirocin 2% Ointment 1 Application(s) Topical every 12 hours  norepinephrine Infusion 0.05 MICROgram(s)/kG/Min IV Continuous <Continuous>  nystatin Powder 1 Application(s) Topical every 12 hours  pantoprazole Infusion 8 mG/Hr IV Continuous <Continuous>  phenylephrine    Infusion 0.1 MICROgram(s)/kG/Min IV Continuous <Continuous>  potassium phosphate IVPB 30 milliMole(s) IV Intermittent once  PrismaSATE Dialysate BGK 4 / 2.5 5000 milliLiter(s) CRRT <Continuous>  PrismaSOL Filtration BGK 4 / 2.5 5000 milliLiter(s) CRRT <Continuous>  PrismaSOL Filtration BGK 4 / 2.5 5000 milliLiter(s) CRRT <Continuous>  trimethoprim / sulfamethoxazole IVPB 390 milliGRAM(s) IV Intermittent every 12 hours  vasopressin Infusion 0.03 Unit(s)/Min IV Continuous <Continuous>    -------------------------------------------------------------------------------------------  Cardiovascular Diagnostic Testing:    ECG: Sinus tachycardia, STD in precordial leads    Echo:     TTE 12/22   1. Normal right ventricular cavity size and mildly reduced right ventricular systolic function.   2. No pericardial effusion seen.   3. Compared to the transthoracic echocardiogram performed on 12/11/2024, there have been no significant interval changes.   4. Left ventricular endocardium is not well visualized; however, the left ventricular systolic function appears grossly normal.   5. Technically difficult image quality.   6. There is no evidence of a left ventricular thrombus.    Stress Testing:    Cath:    Cath 4/2024  Diagnostic Summary:   - Non-obstructive CAD - Mid RCA 50% FFR negative 0.85, Distal LCx  50-60% FFR negative 0.86  - Remainder of vessels with mild luminal irregularities, co-dominant  coronary circulation  - LVEDP 19, no LV-Ao gradient on pullback   - Cathworks FFR with 3D functional mapping of color-coded FFR utilized  for analysis    No coronary contraindications for liver transplantation - asymptomatic  non-obstructive disease to be managed medically.    Recommendations:   - Medical management of non-obstructive CAD with high intensity statin therapy and aggressive ASCVD risk factor modification      -------------------------------------------------------------------------------------------

## 2024-12-26 NOTE — PROGRESS NOTE ADULT - SUBJECTIVE AND OBJECTIVE BOX
Subjective  Unable to participate in exam.     Objective    Vital signs  T(F): , Max: 100.4 (12-25-24 @ 09:30)  HR: 94 (12-26-24 @ 08:30)  BP: 107/53 (12-26-24 @ 07:00)  SpO2: 100% (12-26-24 @ 08:30)  Wt(kg): --    Output     OUT:    Bulb (mL): 155 mL    Bulb (mL): 65 mL    Emesis (mL): 400 mL    Indwelling Catheter - Urethral (mL): 10 mL    Nasogastric/Oral tube (mL): 1600 mL    VAC (Vacuum Assisted Closure) System (mL): 0 mL  Total OUT: 2230 mL    Total NET: -2230 mL      OUT:    Indwelling Catheter - Urethral (mL): 0 mL    Nasogastric/Oral tube (mL): 150 mL  Total OUT: 150 mL    Total NET: -150 mL          Gen: NAD  Abd: soft, nontender, nondistended  : Scrotum less edematous, less red, with persistent layer of brown layer of film over scrotal skin. Portions of the brown layer now turing black/necrotic almost like an eschar formation. Around the edges of the brown/necrotic film can see healthy erythematous tissue. No crepitus appreciated over scrotal skin.     Labs      12-26 @ 07:06    WBC 13.63 / Hct 23.7  / SCr 0.54     12-26 @ 04:08    WBC 12.13 / Hct 20.2  / SCr 0.58         Culture - Blood (collected 12-24-24 @ 03:30)  Source: .Blood BLOOD  Preliminary Report (12-26-24 @ 07:01):    No growth at 48 Hours    Culture - Bronchial (collected 12-23-24 @ 16:37)  Source: Combi-Cath  Gram Stain (12-23-24 @ 22:50):    No polymorphonuclear cells seen per low power field    No squamous epithelial cells per low power field    No organisms seen per oil power field  Final Report (12-25-24 @ 08:06):    Commensal charity consistent with body site    Culture - Blood (collected 12-23-24 @ 16:03)  Source: .Blood BLOOD  Preliminary Report (12-25-24 @ 20:01):    No growth at 48 Hours    Culture - Body Fluid with Gram Stain (collected 12-20-24 @ 18:23)  Source: Abdominal Fl  Gram Stain (12-21-24 @ 05:56):    polymorphonuclear leukocytes seen    Gram positive cocci in pairs seen    by cytocentrifuge  Final Report (12-25-24 @ 21:41):    Rare Enterococcus faecalis  Organism: Enterococcus faecalis (12-25-24 @ 21:41)  Organism: Enterococcus faecalis (12-25-24 @ 21:41)      Method Type: ESAU      -  Ampicillin: S <=2 Predicts results to ampicillin/sulbactam, amoxacillin-clavulanate and  piperacillin-tazobactam.      -  Vancomycin: S 1    Culture - Sputum (collected 12-19-24 @ 14:31)  Source: Trach Asp Tracheal Aspirate  Gram Stain (12-20-24 @ 00:18):    Moderate polymorphonuclear leukocytes per low power field    Rare Squamous epithelial cells per low power field    Numerous Gram Negative Rods per oil power field    Numerous Yeast like cells per oil power field  Final Report (12-21-24 @ 14:57):    Moderate Stenotrophomonas maltophilia    Commensal charity consistent with body site  Organism: Stenotrophomonas maltophilia (12-21-24 @ 14:57)  Organism: Stenotrophomonas maltophilia (12-21-24 @ 14:57)      Method Type: KB      -  Minocycline: S  Organism: Stenotrophomonas maltophilia (12-21-24 @ 14:57)      Method Type: ESAU      -  Levofloxacin: S <=0.5      -  Trimethoprim/Sulfamethoxazole: S <=0.5/9.5        Urine Cx: ?  Blood Cx: ?    Imaging

## 2024-12-26 NOTE — PROGRESS NOTE ADULT - REASON FOR ADMISSION
Liver Transplant Alert and oriented to person, place, time/situation. normal mood and affect. no apparent risk to self or others.

## 2024-12-26 NOTE — PROGRESS NOTE ADULT - SUBJECTIVE AND OBJECTIVE BOX
[facility] DIVISION OF KIDNEY DISEASES AND HYPERTENSION -- FOLLOW UP NOTE  --------------------------------------------------------------------------------  Chief Complaint:    24 hour events/subjective:  Patient was seen and examined at bedside    PAST HISTORY  --------------------------------------------------------------------------------  No significant changes to PMH, PSH, FHx, SHx, unless otherwise noted    ALLERGIES & MEDICATIONS  --------------------------------------------------------------------------------  Allergies    No Known Allergies    Intolerances      Standing Inpatient Medications  albumin human  5% IVPB 500 milliLiter(s) IV Intermittent every 8 hours  buDESOnide    Inhalation Suspension 0.5 milliGRAM(s) Inhalation every 12 hours  calcium gluconate IVPB 2 Gram(s) IV Intermittent once  caspofungin IVPB      caspofungin IVPB 50 milliGRAM(s) IV Intermittent every 24 hours  chlorhexidine 0.12% Liquid 15 milliLiter(s) Oral Mucosa every 12 hours  chlorhexidine 2% Cloths 1 Application(s) Topical <User Schedule>  CRRT Treatment    <Continuous>  dextrose 10% + sodium chloride 0.9%. 1000 milliLiter(s) IV Continuous <Continuous>  fentaNYL   Patch  12 MICROgram(s)/Hr. 1 Patch Transdermal every 48 hours  meropenem  IVPB 1000 milliGRAM(s) IV Intermittent every 12 hours  methylPREDNISolone sodium succinate Injectable 32 milliGRAM(s) IV Push <User Schedule>  minocycline IVPB      minocycline IVPB 200 milliGRAM(s) IV Intermittent every 12 hours  mupirocin 2% Ointment 1 Application(s) Topical every 12 hours  norepinephrine Infusion 0.05 MICROgram(s)/kG/Min IV Continuous <Continuous>  nystatin Powder 1 Application(s) Topical every 12 hours  pantoprazole Infusion 8 mG/Hr IV Continuous <Continuous>  phenylephrine    Infusion 0.1 MICROgram(s)/kG/Min IV Continuous <Continuous>  PrismaSATE Dialysate BGK 4 / 2.5 5000 milliLiter(s) CRRT <Continuous>  PrismaSOL Filtration BGK 4 / 2.5 5000 milliLiter(s) CRRT <Continuous>  PrismaSOL Filtration BGK 4 / 2.5 5000 milliLiter(s) CRRT <Continuous>  propofol Infusion 50 MICROgram(s)/kG/Min IV Continuous <Continuous>  trimethoprim / sulfamethoxazole IVPB 390 milliGRAM(s) IV Intermittent every 12 hours  vasopressin Infusion 0.03 Unit(s)/Min IV Continuous <Continuous>    PRN Inpatient Medications  albuterol/ipratropium for Nebulization 3 milliLiter(s) Nebulizer every 6 hours PRN      REVIEW OF SYSTEMS- unable to obtain       VITALS/PHYSICAL EXAM  --------------------------------------------------------------------------------  T(C): 36.4 (12-26-24 @ 11:00), Max: 37.7 (12-25-24 @ 15:00)  HR: 94 (12-26-24 @ 13:00) (89 - 122)  BP: 107/53 (12-26-24 @ 07:00) (92/50 - 118/53)  RR: 26 (12-26-24 @ 13:00) (15 - 51)  SpO2: 100% (12-26-24 @ 13:00) (94% - 100%)  Wt(kg): --    Weight (kg): 99.5 (12-25-24 @ 00:00)      12-25-24 @ 07:01  -  12-26-24 @ 07:00  --------------------------------------------------------  IN: 6569.1 mL / OUT: 4957 mL / NET: 1612.1 mL    12-26-24 @ 07:01  -  12-26-24 @ 13:42  --------------------------------------------------------  IN: 2674.2 mL / OUT: 1715 mL / NET: 959.2 mL      Physical Exam:  	Gen:  critically ill   	HEENT: PERRL, supple neck, clear oropharynx  	Pulm: CTA B/L  	CV: RRR, S1S2; no rub  	Abd: +BS, soft, nontender/nondistended                      Transplant: No tenderness, swelling  	: No suprapubic tenderness  	UE: Warm, FROM; no edema; no asterixis  	LE: Warm, FROM; no edema  	Skin: Warm, without rashes        LABS/STUDIES  --------------------------------------------------------------------------------              7.6    9.86  >-----------<  31       [12-26-24 @ 12:33]              20.6     135  |  101  |  27  ----------------------------<  170      [12-26-24 @ 07:06]  3.4   |  21  |  0.54        Ca     7.8     [12-26-24 @ 07:06]      Mg     2.4     [12-26-24 @ 07:06]      Phos  2.3     [12-26-24 @ 07:06]    TPro  4.1  /  Alb  3.1  /  TBili  12.2  /  DBili  x   /  AST  2373  /  ALT  660  /  AlkPhos  177  [12-26-24 @ 07:06]    PT/INR: PT 27.8 , INR 2.46       [12-26-24 @ 12:33]  PTT: 66.2       [12-26-24 @ 12:33]      Creatinine Trend:  SCr 0.54 [12-26 @ 07:06]  SCr 0.58 [12-26 @ 04:08]  SCr 0.60 [12-26 @ 01:18]  SCr 0.67 [12-25 @ 22:23]  SCr 0.73 [12-25 @ 18:20]        Urinalysis - [12-26-24 @ 07:06]      Color  / Appearance  / SG  / pH       Gluc 170 / Ketone   / Bili  / Urobili        Blood  / Protein  / Leuk Est  / Nitrite       RBC  / WBC  / Hyaline  / Gran  / Sq Epi  / Non Sq Epi  / Bacteria       Vitamin D (25OH) <6.0      [11-21-24 @ 08:32]  TSH 0.68      [12-12-24 @ 12:27]  Lipid: chol 114, , HDL 47, LDL --      [04-24-24 @ 06:48]

## 2024-12-26 NOTE — PROGRESS NOTE ADULT - ASSESSMENT
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 Post-op course c/b worsening mental status and acute hypoxic respiratory failure requiring multiple intubations/extubations, no longer intubated, now s/p tracheostomy (as of 12/23/2024) and cardiogenic shock s/p Percutaneous insertion of an intra-aortic balloon pump and septic shock/colonic ischemia s/p total abdominal colectomy with ileostomy on 12/7/24, s/p ex-lap w/ileostomy on 12/9/24. Urology initially consulted 12/9-12/16 for scrotal edema w/ large skin breakdown. Scrotum at that time was larger, more red edematous, swollen with skin breakdown, no crepitus felt. Scrotal/testicular ultrasounds were performed at that time, all showing scrotal edema, no necrotizing infection/abscess. Urology signed off, recommending scrotal support, wound care, no surgical intervention indicated at that time.     Urology was recalled today 12/23 for worsening of scrotal skin sloughing. Per primary team, edema and scrotal swelling has improved. However, there is a layer of brown film overlaying the ventral area of the scrotum encompassing both the ventral layer of skin of the left and right scrotum. The brown film sloughs and bleed superficially when irritated, it is cold to the touch. There is an area of similar film overlaying a section of the right inner thigh. There is clear delineation from the brown layer of film and normal skin-colored scrotum. The normal colored scrotal skin is warm to touch.  No appreciable flow to testicles shown on ultrasound likely also 2/2 to prolonged pressor requirements decreasing blood flow to testicles and should resolve with improved peripheral perfusion. On examination testicles in normal lie and are soft, no role for orchiopexy for testicles.    Recs  - No appreciable flow to testicles shown on ultrasound likely also 2/2 to prolonged pressor requirements decreasing blood flow to testicles and should resolve with improved peripheral perfusion. On examination testicles in normal lie and are soft, no role for orchiopexy for testicles.  - No urologic surgical intervention indicated at this time -> Exam today shows progression with areas of brown film now turing necrotic similar to eschar formation. Underyling tissue is still health and think this is just expected progression of dead overlying skin necrosing. Exam and and imaging do not suggest progressing necrotizing infection and do not believe this to be the source of sepsis. Recent liver US shows air in portal vein so underlying abdominal pathology may be driving sepsis.  - Reconsult derm for recommendations on topical wound care  - Wet to dry dressing to aid in debridement of tissue   - Continue with antibiotics   - Scrotal support  - Continue with wound care  - Urology to follow     Discussed with Dr. Guerrero     [facility]  [address] [place] [address], [address]  [place] [address] [phone]

## 2024-12-26 NOTE — PROGRESS NOTE ADULT - ASSESSMENT
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 Patient underwent OLT on 11/15/2024 with complicated post-op course including E.coli bacteremia (12/6), cardiogenic shock requiring IAPB on 12/7, colectomy on 12/7 with RTOR for closure and ileostomy creation. IAPB was removed on 12/10, currently intubated . Hematology was consulted for thrombocytopenia.    CBC (12/13/2024): WBC 9.62, Hgb 10.4, MCV 82 Plt 13  Coags (12/13/2024): Fibrinogen 95, PT 24, PTT 60.7, INR 2.09   Organ function: Cr 0.89, Bili 14, AST 41, ALT 56       # Thrombocytopenia  - Review of plt trend shows borderline low plt on admission (60-100s) consistent with liver disease. Plt improved post-OLT but began to decline on 12/7/2024. Timeline coincides with his acute decompensation including bacteremia, septic and cardiogenic shock requiring IAPB.   - Peripheral blood cytopenias are usually observed following hypotensive episodes (both septic shock and non-septic shock. While all cell lines can be affected, thrombocytopenia is most commonly seen. The degree of thrombocytopenia usually correlates with the severity and duration of hypotension. It is a well known phenomenon that shock results in tissue hypoperfusion and hypoxia leading to organ failure (kidney, liver, lung etc.). The bone marrow is an organ and as such the same phenomenon occurs. Hypoperfusion and  hypoxia result in injury to hematopoietic progenitor cells; leading to cytopenia.   https://pubmed.ncbi.nlm.nih.gov/07540538/    - In addition, patient was on IABP. The high shear forces during IABP pumping action can cause platelet destruction and contribute to thrombocytopenia.   - Expect continued decline in platelets while hypotension is ongoing. If hypotension remains unresolved, will eventually start seeing decrease in Hgb and WBC. It is imperative to note that upon resolution of hypotension, cytopenias will persist until bone marrow starts to recover, as such, do not anticipate immediate improvement in counts after BP normalizes.   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 Patient underwent OLT on 11/15/2024 with complicated post-op course including E.coli bacteremia (12/6), cardiogenic shock requiring IAPB on 12/7, colectomy on 12/7 with RTOR for closure and ileostomy creation. IAPB was removed on 12/10, currently intubated . Hematology was consulted for thrombocytopenia.    CBC (12/13/2024): WBC 9.62, Hgb 10.4, MCV 82 Plt 13  Coags (12/13/2024): Fibrinogen 95, PT 24, PTT 60.7, INR 2.09   Organ function: Cr 0.89, Bili 14, AST 41, ALT 56     #Thrombocytopenia likely multifactorial in setting of critical illness, medications (pressors/ antibiotics) GONZALES cirrhosis and organ failure.   - Review of plt trend shows borderline low plt on admission (60-100s) consistent with liver disease. Plt improved post-OLT but began to decline on 12/7/2024. Timeline coincides with his acute decompensation including bacteremia, septic and cardiogenic shock requiring IAPB.   - Peripheral blood cytopenias are usually observed following hypotensive episodes (both septic shock and non-septic shock. While all cell lines can be affected, thrombocytopenia is most commonly seen. The degree of thrombocytopenia usually correlates with the severity and duration of hypotension. It is a well known phenomenon that shock results in tissue hypoperfusion and hypoxia leading to organ failure (kidney, liver, lung etc.). The bone marrow is an organ and as such the same phenomenon occurs. Hypoperfusion and  hypoxia result in injury to hematopoietic progenitor cells; leading to cytopenia.   https://pubmed.ncbi.nlm.nih.gov/16848235/    - In addition, patient was on IABP. The high shear forces during IABP pumping action can cause platelet destruction and contribute to thrombocytopenia.   - Expect continued decline in platelets while hypotension is ongoing. If hypotension remains unresolved, will eventually start seeing decrease in Hgb and WBC. It is imperative to note that upon resolution of hypotension, cytopenias will persist until bone marrow starts to recover, as such, do not anticipate immediate improvement in counts after BP normalizes.   - Peripheral smear reviewed 12/26/24 showed normochromic, target cells (consistent with liver disease), 0-1 schistocytes (not consistent with DIC), 1-2 plt per HPF     #Coagulopathy   - Coagulopathy on presentation due to liver disease, which resolved with OLT but again since 12/6, has been worsening. Together with worsening of his Tbili and liver markers and decreased fibrinogen, reflects liver injury and impaired synthetic function     Recommendations:   - No role for PLEX or IVIG given etiology of thrombocytopenia  is not immune mediated or microangiopathic in nature  - Etiology of thrombocytopenia is multifactorial as described above: sepsis, shock, IABP   - Risks vs benefitis from restarting AC given recurrent bleeding risk vs increased thrombosis risk.   - Transfuse for platelets  <10k ( or <15k if febrile or <50k if non-CNS bleeding)       Note not finalized until signed by attending.   Please do not hesitate to page with questions.     Leanne Szymanski MD  Hematology/Oncology Fellow PGY5  Available on Microsoft Teams   Pager: [phone]  For weekends and evenings (5 pm - 8 am), please page fellow on call.  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 Patient underwent OLT on 11/15/2024 with complicated post-op course including E.coli bacteremia (12/6), cardiogenic shock requiring IAPB on 12/7, colectomy on 12/7 with RTOR for closure and ileostomy creation  IAPB was removed on 12/10, s/p trached and PEG with course complicated by bleed while on AC requiring transfusion support. Hematology was consulted for thrombocytopenia.    CBC (12/13/2024): WBC 9.62, Hgb 10.4, MCV 82 Plt 13  Coags (12/13/2024): Fibrinogen 95, PT 24, PTT 60.7, INR 2.09   Organ function: Cr 0.89, Bili 14, AST 41, ALT 56     #Thrombocytopenia likely multifactorial in setting of critical illness/bactermia, medications (pressors/ antibiotics) GONZALES cirrhosis with portal hypertension, post-OLT complication, IABP induced platelet destruction from high shear forces.   - Borderline thrombocytopenia (60–100k) on admission, consistent with liver disease.  - Post-OLT improvement, followed by decline since 12/7/2024, correlating with acute decompensation (bacteremia, shock, IABP).  - Hypoperfusion-induced bone marrow suppression: All cell lines can be affected, with thrombocytopenia being the most pronounced. Recovery of counts typically lags behind hemodynamic improvement.  Peripheral smear (12/26/2024): Normochromic RBCs, target cells (liver disease), 0–1 schistocytes (not consistent with DIC), 1–2 platelets/HPF.   https://pubmed.ncbi.nlm.nih.gov/11703475/    - In addition, patient was on IABP. The high shear forces during IABP pumping action can cause platelet destruction and contribute to thrombocytopenia.   - Expect continued decline in platelets while hypotension is ongoing. If hypotension remains unresolved, will eventually start seeing decrease in Hgb and WBC. It is imperative to note that upon resolution of hypotension, cytopenias will persist until bone marrow starts to recover, as such, do not anticipate immediate improvement in counts after BP normalizes.   - Peripheral smear reviewed 12/26/24 showed normochromic, target cells (consistent with liver disease), 0-1 schistocytes (not consistent with DIC), 1-2 plt per HPF     #Coagulopathy   - Coagulopathy on presentation due to liver disease, which resolved with OLT but again since 12/6, has been worsening. Together with worsening of his Tbili and liver markers and decreased fibrinogen, reflects liver injury and impaired synthetic function     Recommendations:   - No role for PLEX or IVIG given etiology of thrombocytopenia  is not immune mediated or microangiopathic in nature  - Etiology of thrombocytopenia is multifactorial as described above: sepsis, shock, IABP   - Risks vs benefit discussion regarding restarting AC given recurrent bleeding risk vs increased thrombosis risk.   - High bleed risk for DAPT therapy, continue with care per primary team  - Transfuse for platelets  <10k ( or <15k if febrile or <50k if non-CNS bleeding)   - Hematology team to sign off, please page/recall prn     Note not finalized until signed by attending.   Please do not hesitate to page with questions.     Leanne Szymanski MD  Hematology/Oncology Fellow PGY5  Available on [employer] Teams   Pager: [phone]  For weekends and evenings (5 pm - 8 am), please page fellow on call.

## 2024-12-26 NOTE — PROGRESS NOTE ADULT - ASSESSMENT
Neuro:  - Opens eyes intermittently, Not following commands, off sedation  - pain control w/ oxycodone  - CT head 11/19, 11/21, 11/25, 11/29, 12/5 negative  - EEG: Mild diffuse cerebral dysfunction that is not specific in etiology. No epileptic discharges recorded. No seizures recorded.  - Holding home xanax, Abilify, Zoloft, Remeron, Lexapro  - CT head 12/20 showing age-indeterminate infarct, f/u Neurology recs  - No need for MRI     Resp:  -S/p bedside tracheostomy 12/17  -PRVC 14/480/5/30  -SBT daily  -c/w inhaled bronchodilator  -VBG daily    CV:  -IABP removed 12/10  -s/p dobutamine and amiodarone gtt   -home metoprolol 12.5 q8hr held for pressor req, restarted 12.5 BID overnight  -TTE 12/11 shows EF of 55-60%  - +/- levo, vaso  - c/w trending troponin, cardiology following    GI:  -trend LFTs  -NPO, NGT  -protonix QD  -monitor ALYSSA output    /Renal:  -CRRT restarted 12/23  -Monitor BUN/Cr, I&Os, trend UOP  -Scrotal US 12/15 -> edema, no abscess -> elevate  -rpt scrotal doppler for concern of ischemia > low flow state, low concern for abscess  -nichols  - Albumin 25% 50ml q6     Heme:  -trend H/H  -monitor coags  -trend platelets  -argatroban gtt held  -12/25: 2/2/1/1    ID:  -ABx transition to jeniffer, caspo (12/16-), bactrim, minocycline  -Tracheal aspirate -> Stenotrophomas maltophilia  -U Cx 12/16 positive, Combi cath 12/19 positive  -monitor WBC, fever curve  -CMV PPx w/ ganciclovir (holding now iso thrombocytopenia)  -holding cellcept, cyclosporine    Endo:  -monitor glucose   -methylprednisone 32 qd  -ISS, NPH d/c 2/2 hypoglycemia    Lines:   -NGT  -nichols exchanged 12/16  -ALYSSA x 2   -R IJ Babatunde   -L IJ CVC

## 2024-12-26 NOTE — PROGRESS NOTE ADULT - SUBJECTIVE AND OBJECTIVE BOX
Follow Up:      Interval History:    REVIEW OF SYSTEMS  [  ] ROS unobtainable because:    [  ] All other systems negative except as noted below    Constitutional:  [ ] fever [ ] chills  [ ] weight loss  [ ] weakness  Skin:  [ ] rash [ ] phlebitis	  Eyes: [ ] icterus [ ] pain  [ ] discharge	  ENMT: [ ] sore throat  [ ] thrush [ ] ulcers [ ] exudates  Respiratory: [ ] dyspnea [ ] hemoptysis [ ] cough [ ] sputum	  Cardiovascular:  [ ] chest pain [ ] palpitations [ ] edema	  Gastrointestinal:  [ ] nausea [ ] vomiting [ ] diarrhea [ ] constipation [ ] pain	  Genitourinary:  [ ] dysuria [ ] frequency [ ] hematuria [ ] discharge [ ] flank pain  [ ] incontinence  Musculoskeletal:  [ ] myalgias [ ] arthralgias [ ] arthritis  [ ] back pain  Neurological:  [ ] headache [ ] seizures  [ ] confusion/altered mental status    Allergies  No Known Allergies        ANTIMICROBIALS:  caspofungin IVPB    caspofungin IVPB 50 every 24 hours  meropenem  IVPB 1000 every 12 hours  minocycline IVPB    minocycline IVPB 200 every 12 hours  trimethoprim / sulfamethoxazole IVPB 390 every 12 hours      OTHER MEDS:  MEDICATIONS  (STANDING):  albuterol/ipratropium for Nebulization 3 every 6 hours PRN  buDESOnide    Inhalation Suspension 0.5 every 12 hours  fentaNYL   Patch  12 MICROgram(s)/Hr. 1 every 48 hours  methylPREDNISolone sodium succinate Injectable 32 <User Schedule>  norepinephrine Infusion 0.05 <Continuous>  pantoprazole Infusion 8 <Continuous>  phenylephrine    Infusion 0.1 <Continuous>  propofol Infusion 50 <Continuous>  vasopressin Infusion 0.03 <Continuous>      Vital Signs Last 24 Hrs  T(C): 36.4 (26 Dec 2024 15:00), Max: 37.4 (25 Dec 2024 19:00)  T(F): 97.5 (26 Dec 2024 15:00), Max: 99.3 (25 Dec 2024 19:00)  HR: 93 (26 Dec 2024 16:00) (89 - 112)  BP: 107/53 (26 Dec 2024 07:00) (107/53 - 107/53)  BP(mean): 76 (26 Dec 2024 07:00) (76 - 76)  RR: 16 (26 Dec 2024 16:00) (14 - 48)  SpO2: 95% (26 Dec 2024 16:00) (94% - 100%)    Parameters below as of 26 Dec 2024 15:00  Patient On (Oxygen Delivery Method): ventilator        PHYSICAL EXAMINATION:  General: Alert and Awake, NAD  HEENT: PERRL, EOMI  Neck: Supple  Cardiac: RRR, No M/R/G  Resp: CTAB, No Wh/Rh/Ra  Abdomen: NBS, NT/ND, No HSM, No rigidity or guarding  MSK: No LE edema. No Calf tenderness  : No nichols  Skin: No rashes or lesions. Skin is warm and dry to the touch.   Neuro: Alert and Awake. CN 2-12 Grossly intact. Moves all four extremities spontaneously.  Psych: Calm, Pleasant, Cooperative                          7.6    9.86  )-----------( 31       ( 26 Dec 2024 12:33 )             20.6       12-26    137  |  104  |  23  ----------------------------<  219[H]  3.8   |  20[L]  |  0.52    Ca    7.8[L]      26 Dec 2024 12:33  Phos  2.5     12-26  Mg     2.6     12-26    TPro  4.2[L]  /  Alb  3.3  /  TBili  11.2[H]  /  DBili  x   /  AST  1576[H]  /  ALT  496[H]  /  AlkPhos  153[H]  12-26      Urinalysis Basic - ( 26 Dec 2024 12:33 )    Color: x / Appearance: x / SG: x / pH: x  Gluc: 219 mg/dL / Ketone: x  / Bili: x / Urobili: x   Blood: x / Protein: x / Nitrite: x   Leuk Esterase: x / RBC: x / WBC x   Sq Epi: x / Non Sq Epi: x / Bacteria: x        MICROBIOLOGY:  Vancomycin Level, Random: 15.0 ug/mL (12-26-24 @ 06:24)  v  .Blood BLOOD  12-24-24   No growth at 48 Hours  --  --      Combi-Cath  12-23-24   Commensal charity consistent with body site  --    No polymorphonuclear cells seen per low power field  No squamous epithelial cells per low power field  No organisms seen per oil power field      .Blood BLOOD  12-23-24   No growth at 48 Hours  --  --      Abdominal Fl  12-20-24   Rare Enterococcus faecalis  --  Enterococcus faecalis      Trach Asp Tracheal Aspirate  12-19-24   Moderate Stenotrophomonas maltophilia  Commensal charity consistent with body site  --  Stenotrophomonas maltophilia      .Blood BLOOD  12-19-24   No growth at 5 days  --  --      Catheterized Catheterized  12-16-24   10,000 - 49,000 CFU/mL Enterococcus faecalis  --  Enterococcus faecalis      .Blood BLOOD  12-16-24   No growth at 5 days  --  --      Combi-Cath  12-16-24   Commensal charity consistent with body site  --    No polymorphonuclear leukocytes seen per low power field  No Squamous epithelial cells seen per low power field  No organisms seen per oil power field      .Blood BLOOD  12-16-24   No growth at 5 days  --  --      Combi-Cath  12-14-24   Commensal charity consistent with body site  --    Numerous polymorphonuclear leukocytes per low power field  No Squamous epithelial cells per low power field  No organisms seen per oil power field      .Other  12-12-24   No fungus isolated at 1 week.  --  --      .Blood BLOOD  12-07-24   No growth at 5 days  --  --      Body Fluid  12-07-24   No growth at 5 days  --    polymorphonuclear leukocytes seen  No organisms seen  by cytocentrifuge      .Blood BLOOD  12-06-24   Growth in aerobic and anaerobic bottles: Escherichia coli  See previous culture [id number]  --    Growth in aerobic bottle: Gram Negative Rods  Growth in anaerobic bottle: Gram Negative Rods      .Blood BLOOD  12-06-24   Growth in aerobic and anaerobic bottles: Escherichia coli  Direct identification is available within approximately 3-5  hours either by Blood Panel Multiplexed PCR or Direct  MALDI-TOF. Details: [email or web address]  --  Blood Culture PCR  Escherichia coli      .Blood BLOOD  12-05-24   No growth at 5 days  --  --      .Blood BLOOD  12-05-24   No growth at 5 days  --  --      .Stool  12-01-24   No enteric pathogens isolated.  (Stool culture examined for Salmonella,  Shigella, Campylobacter, Aeromonas, Plesiomonas,  Vibrio, E.coli O157 and Yersinia)  --  --        HIV-1 RNA Quantitative, Viral Load Log: NOT DET. lg /mL (12-18-24 @ 22:14)  Toxoplasma IgG Screen: 78.00 IU/mL (11-15-24 @ 00:41)  CMV IgG Antibody: 1.50 U/mL (11-15-24 @ 00:41)    CMVPCR Log: Det <1.54 Assay Dynamic Range: 34.5 to 1.0E+07 IU/mL (1.54 to 7.00 Log10 IU/mL)  Assay lower limit of quantification (LLOQ) is 34.5 IU/mL (1.54 Log10  IU/mL)  CMV DNA detected below the LLOQ will be reported as Detected < 34.5 IU/mL  (<1.54 Log10 IU/mL)  Nydia Cytomegalovirus (CMV) is an FDA-cleared quantitative test that  enables the detection and quantitation of CMV DNA in EDTA plasma of  infected transplant patients on the nydia 8800 system. This test was  verified by [employer]. Results should be interpreted  with consideration of all clinical findings and laboratory findings and  should not form the sole basis for a diagnosis or treatment decision. Qdf79FJ/mL (12-23 @ 06:15)        RADIOLOGY:    <The imaging below has been reviewed and visualized by me independently. Findings as detailed in report below> Follow Up:  shock    Interval History: bloody vomitus aspirated overnight. s/p amikacin x1    REVIEW OF SYSTEMS  [ x ] ROS unobtainable because:  intubated  [  ] All other systems negative except as noted below    Constitutional:  [ ] fever [ ] chills  [ ] weight loss  [ ] weakness  Skin:  [ ] rash [ ] phlebitis	  Eyes: [ ] icterus [ ] pain  [ ] discharge	  ENMT: [ ] sore throat  [ ] thrush [ ] ulcers [ ] exudates  Respiratory: [ ] dyspnea [ ] hemoptysis [ ] cough [ ] sputum	  Cardiovascular:  [ ] chest pain [ ] palpitations [ ] edema	  Gastrointestinal:  [ ] nausea [ ] vomiting [ ] diarrhea [ ] constipation [ ] pain	  Genitourinary:  [ ] dysuria [ ] frequency [ ] hematuria [ ] discharge [ ] flank pain  [ ] incontinence  Musculoskeletal:  [ ] myalgias [ ] arthralgias [ ] arthritis  [ ] back pain  Neurological:  [ ] headache [ ] seizures  [ ] confusion/altered mental status    Allergies  No Known Allergies        ANTIMICROBIALS:  caspofungin IVPB    caspofungin IVPB 50 every 24 hours  meropenem  IVPB 1000 every 12 hours  minocycline IVPB    minocycline IVPB 200 every 12 hours  trimethoprim / sulfamethoxazole IVPB 390 every 12 hours      OTHER MEDS:  MEDICATIONS  (STANDING):  albuterol/ipratropium for Nebulization 3 every 6 hours PRN  buDESOnide    Inhalation Suspension 0.5 every 12 hours  fentaNYL   Patch  12 MICROgram(s)/Hr. 1 every 48 hours  methylPREDNISolone sodium succinate Injectable 32 <User Schedule>  norepinephrine Infusion 0.05 <Continuous>  pantoprazole Infusion 8 <Continuous>  phenylephrine    Infusion 0.1 <Continuous>  propofol Infusion 50 <Continuous>  vasopressin Infusion 0.03 <Continuous>      Vital Signs Last 24 Hrs  T(C): 36.4 (26 Dec 2024 15:00), Max: 37.4 (25 Dec 2024 19:00)  T(F): 97.5 (26 Dec 2024 15:00), Max: 99.3 (25 Dec 2024 19:00)  HR: 93 (26 Dec 2024 16:00) (89 - 112)  BP: 107/53 (26 Dec 2024 07:00) (107/53 - 107/53)  BP(mean): 76 (26 Dec 2024 07:00) (76 - 76)  RR: 16 (26 Dec 2024 16:00) (14 - 48)  SpO2: 95% (26 Dec 2024 16:00) (94% - 100%)    Parameters below as of 26 Dec 2024 15:00  Patient On (Oxygen Delivery Method): ventilator      PHYSICAL EXAMINATION:  General: Intubated and Sedated  HEENT: +ETT  Neck: Supple  Cardiac: RRR, No M/R/G  Resp: CTAB, No Wh/Rh/Ra  Abdomen: NBS, NT/ND, No HSM, No rigidity or guarding  MSK: No LE edema. No Calf tenderness  : +testicular edema with improved erythema compared to week prior  Skin: No rashes or lesions. Skin is warm and dry to the touch.   Neuro: Intubated and Sedated  Psych: Unable to assess - intubated and sedated                          7.6    9.86  )-----------( 31       ( 26 Dec 2024 12:33 )             20.6       12-26    137  |  104  |  23  ----------------------------<  219[H]  3.8   |  20[L]  |  0.52    Ca    7.8[L]      26 Dec 2024 12:33  Phos  2.5     12-26  Mg     2.6     12-26    TPro  4.2[L]  /  Alb  3.3  /  TBili  11.2[H]  /  DBili  x   /  AST  1576[H]  /  ALT  496[H]  /  AlkPhos  153[H]  12-26      Urinalysis Basic - ( 26 Dec 2024 12:33 )    Color: x / Appearance: x / SG: x / pH: x  Gluc: 219 mg/dL / Ketone: x  / Bili: x / Urobili: x   Blood: x / Protein: x / Nitrite: x   Leuk Esterase: x / RBC: x / WBC x   Sq Epi: x / Non Sq Epi: x / Bacteria: x        MICROBIOLOGY:  Vancomycin Level, Random: 15.0 ug/mL (12-26-24 @ 06:24)  v  .Blood BLOOD  12-24-24   No growth at 48 Hours  --  --      Combi-Cath  12-23-24   Commensal charity consistent with body site  --    No polymorphonuclear cells seen per low power field  No squamous epithelial cells per low power field  No organisms seen per oil power field      .Blood BLOOD  12-23-24   No growth at 48 Hours  --  --      Abdominal Fl  12-20-24   Rare Enterococcus faecalis  --  Enterococcus faecalis      Trach Asp Tracheal Aspirate  12-19-24   Moderate Stenotrophomonas maltophilia  Commensal charity consistent with body site  --  Stenotrophomonas maltophilia      .Blood BLOOD  12-19-24   No growth at 5 days  --  --      Catheterized Catheterized  12-16-24   10,000 - 49,000 CFU/mL Enterococcus faecalis  --  Enterococcus faecalis      .Blood BLOOD  12-16-24   No growth at 5 days  --  --      Combi-Cath  12-16-24   Commensal charity consistent with body site  --    No polymorphonuclear leukocytes seen per low power field  No Squamous epithelial cells seen per low power field  No organisms seen per oil power field      .Blood BLOOD  12-16-24   No growth at 5 days  --  --      Combi-Cath  12-14-24   Commensal charity consistent with body site  --    Numerous polymorphonuclear leukocytes per low power field  No Squamous epithelial cells per low power field  No organisms seen per oil power field      .Other  12-12-24   No fungus isolated at 1 week.  --  --      .Blood BLOOD  12-07-24   No growth at 5 days  --  --      Body Fluid  12-07-24   No growth at 5 days  --    polymorphonuclear leukocytes seen  No organisms seen  by cytocentrifuge      .Blood BLOOD  12-06-24   Growth in aerobic and anaerobic bottles: Escherichia coli  See previous culture [id number]  --    Growth in aerobic bottle: Gram Negative Rods  Growth in anaerobic bottle: Gram Negative Rods      .Blood BLOOD  12-06-24   Growth in aerobic and anaerobic bottles: Escherichia coli  Direct identification is available within approximately 3-5  hours either by Blood Panel Multiplexed PCR or Direct  MALDI-TOF. Details: [email or web address]  --  Blood Culture PCR  Escherichia coli      .Blood BLOOD  12-05-24   No growth at 5 days  --  --      .Blood BLOOD  12-05-24   No growth at 5 days  --  --      .Stool  12-01-24   No enteric pathogens isolated.  (Stool culture examined for Salmonella,  Shigella, Campylobacter, Aeromonas, Plesiomonas,  Vibrio, E.coli O157 and Yersinia)  --  --        HIV-1 RNA Quantitative, Viral Load Log: NOT DET. lg /mL (12-18-24 @ 22:14)  Toxoplasma IgG Screen: 78.00 IU/mL (11-15-24 @ 00:41)  CMV IgG Antibody: 1.50 U/mL (11-15-24 @ 00:41)    CMVPCR Log: Det <1.54 Assay Dynamic Range: 34.5 to 1.0E+07 IU/mL (1.54 to 7.00 Log10 IU/mL)  Assay lower limit of quantification (LLOQ) is 34.5 IU/mL (1.54 Log10  IU/mL)  CMV DNA detected below the LLOQ will be reported as Detected < 34.5 IU/mL  (<1.54 Log10 IU/mL)  Nydia Cytomegalovirus (CMV) is an FDA-cleared quantitative test that  enables the detection and quantitation of CMV DNA in EDTA plasma of  infected transplant patients on the nydia [employer] system. This test was  verified by [employer]. Results should be interpreted  with consideration of all clinical findings and laboratory findings and  should not form the sole basis for a diagnosis or treatment decision. Lzv09HI/mL (12-23 @ 06:15)        RADIOLOGY:    <The imaging below has been reviewed and visualized by me independently. Findings as detailed in report below>    < from: US Trans Liver W/ Doppler (12.26.24 @ 13:55) >  IMPRESSION:  Tortuous common hepatic artery with elevated peak systolic velocities up   to 635 cm/s, not noted on prior study.    Previously noted perihepatic collection adjacent to the dayana hepatis is   now increased in size and measures up to 4.5 cm.    Additional complex perihepatic collection with internal surgical drain is   stable in appearance.    Mildly prominent common bile duct measuring 7 mm, with mild intrahepatic   ductal dilatation.    < end of copied text >

## 2024-12-26 NOTE — PROCEDURE NOTE - NSPOSTPRCRAD_GEN_A_CORE
central line located in the superior vena cava/no pneumothorax/post-procedure radiography performed

## 2024-12-26 NOTE — PROGRESS NOTE ADULT - ASSESSMENT
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 Post-op course c/b worsening mental status and acute hypoxic respiratory failure requiring multiple intubations/extubations, currently extubated (as of 11/26/24) and colonic ileus s/p several rounds of Relistor and Neostigmine with NGT and rectal tube currently in place now with septic shock of unclear etiology. Transplant nephrology consulted for metabolic acidosis and MORAIMA.    1. s/p OLT 11/15/24 with oliguric MORAIMA in setting of septic shock.  Likely hemodynamically mediated in setting of cardiogenic and septic shock on multiple vasopressors and prior IABP.   CT AP non-con: Bilateral renal cysts including cysts with peripheral calcification in the left kidney.  Initiated on CRRT 12/7/24- 12/15/24 at 1AM stopped. s/p IHD 12/18/24 however required levophed. 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 Post-op course c/b worsening mental status and acute hypoxic respiratory failure requiring multiple intubations/extubations, currently extubated (as of 11/26/24) and colonic ileus s/p several rounds of Relistor and Neostigmine with NGT and rectal tube currently in place now with septic shock of unclear etiology. Transplant nephrology consulted for metabolic acidosis and MORAIMA.    1. s/p OLT 11/15/24 with oliguric MORAIMA in setting of septic shock.  Likely hemodynamically mediated in setting of cardiogenic and septic shock on multiple vasopressors and prior IABP.   CT AP non-con: Bilateral renal cysts including cysts with peripheral calcification in the left kidney.  Initiated on CRRT 12/7/24- 12/15/24 at 1AM stopped. s/p IHD 12/18/24 however required levophed. Now back on CRRT, will continue.

## 2024-12-26 NOTE — PROGRESS NOTE ADULT - SUBJECTIVE AND OBJECTIVE BOX
Hematology Follow-up    INTERVAL HPI/OVERNIGHT EVENTS:  Patient S&E at bedside. No o/n events, patient resting comfortably. No complaints at this time. Patient denies fever, chills, dizziness, weakness, CP, palpitations, SOB, cough, N/V/D/C, dysuria, changes in bowel movements, LE edema.    VITAL SIGNS:  T(F): 97.5 (12-26-24 @ 11:00)  HR: 97 (12-26-24 @ 13:30)  BP: 107/53 (12-26-24 @ 07:00)  RR: 26 (12-26-24 @ 13:30)  SpO2: 100% (12-26-24 @ 13:30)  Wt(kg): --    PHYSICAL EXAM:    Constitutional: AAOx3, NAD,   Eyes: PERRL, EOMI, sclera non-icteric  Neck: supple, no masses, no JVD  Respiratory: CTA b/l, good air entry b/l, no wheezing, rhonchi, rales, with normal respiratory effort and no intercostal retractions  Cardiovascular: RRR, normal S1S2, no M/R/G  Gastrointestinal: soft, NTND, no masses palpable, BS normal in all four quadrants, no HSM  Extremities:  no c/c/e  Neurological: Grossly intact  Skin: Normal temperature    MEDICATIONS  (STANDING):  albumin human  5% IVPB 500 milliLiter(s) IV Intermittent every 8 hours  buDESOnide    Inhalation Suspension 0.5 milliGRAM(s) Inhalation every 12 hours  caspofungin IVPB      caspofungin IVPB 50 milliGRAM(s) IV Intermittent every 24 hours  chlorhexidine 0.12% Liquid 15 milliLiter(s) Oral Mucosa every 12 hours  chlorhexidine 2% Cloths 1 Application(s) Topical <User Schedule>  CRRT Treatment    <Continuous>  dextrose 10% + sodium chloride 0.9%. 1000 milliLiter(s) (50 mL/Hr) IV Continuous <Continuous>  fentaNYL   Patch  12 MICROgram(s)/Hr. 1 Patch Transdermal every 48 hours  meropenem  IVPB 1000 milliGRAM(s) IV Intermittent every 12 hours  methylPREDNISolone sodium succinate Injectable 32 milliGRAM(s) IV Push <User Schedule>  minocycline IVPB      minocycline IVPB 200 milliGRAM(s) IV Intermittent every 12 hours  mupirocin 2% Ointment 1 Application(s) Topical every 12 hours  norepinephrine Infusion 0.05 MICROgram(s)/kG/Min (9.33 mL/Hr) IV Continuous <Continuous>  nystatin Powder 1 Application(s) Topical every 12 hours  pantoprazole Infusion 8 mG/Hr (10 mL/Hr) IV Continuous <Continuous>  phenylephrine    Infusion 0.1 MICROgram(s)/kG/Min (1.87 mL/Hr) IV Continuous <Continuous>  PrismaSATE Dialysate BGK 4 / 2.5 5000 milliLiter(s) (1500 mL/Hr) CRRT <Continuous>  PrismaSOL Filtration BGK 4 / 2.5 5000 milliLiter(s) (1250 mL/Hr) CRRT <Continuous>  PrismaSOL Filtration BGK 4 / 2.5 5000 milliLiter(s) (250 mL/Hr) CRRT <Continuous>  propofol Infusion 50 MICROgram(s)/kG/Min (29.9 mL/Hr) IV Continuous <Continuous>  trimethoprim / sulfamethoxazole IVPB 390 milliGRAM(s) IV Intermittent every 12 hours  vasopressin Infusion 0.03 Unit(s)/Min (4.5 mL/Hr) IV Continuous <Continuous>    MEDICATIONS  (PRN):  albuterol/ipratropium for Nebulization 3 milliLiter(s) Nebulizer every 6 hours PRN Shortness of Breath and/or Wheezing      No Known Allergies      LABS:                        7.6    9.86  )-----------( 31       ( 26 Dec 2024 12:33 )             20.6     12-26    137  |  104  |  23  ----------------------------<  219[H]  3.8   |  20[L]  |  0.52    Ca    7.8[L]      26 Dec 2024 12:33  Phos  2.5     12-26  Mg     2.6     12-26    TPro  4.2[L]  /  Alb  3.3  /  TBili  11.2[H]  /  DBili  x   /  AST  1576[H]  /  ALT  496[H]  /  AlkPhos  153[H]  12-26    PT/INR - ( 26 Dec 2024 12:33 )   PT: 27.8 sec;   INR: 2.46 ratio         PTT - ( 26 Dec 2024 12:33 )  PTT:66.2 sec Lactate Dehydrogenase, Serum: 766 U/L (12-26 @ 12:33)    Urinalysis Basic - ( 26 Dec 2024 12:33 )    Color: x / Appearance: x / SG: x / pH: x  Gluc: 219 mg/dL / Ketone: x  / Bili: x / Urobili: x   Blood: x / Protein: x / Nitrite: x   Leuk Esterase: x / RBC: x / WBC x   Sq Epi: x / Non Sq Epi: x / Bacteria: x        RADIOLOGY & ADDITIONAL TESTS:  Studies reviewed.   Hematology Follow-up    INTERVAL HPI/OVERNIGHT EVENTS:  Patient seen and evaluated at bedside, remains critically ill, on multiple pressors, sedation, CRRt.     VITAL SIGNS:  T(F): 97.5 (12-26-24 @ 11:00)  HR: 97 (12-26-24 @ 13:30)  BP: 107/53 (12-26-24 @ 07:00)  RR: 26 (12-26-24 @ 13:30)  SpO2: 100% (12-26-24 @ 13:30)  Wt(kg): --    PHYSICAL EXAM:    Constitutional: AAOx3, NAD,   Eyes: PERRL, EOMI, sclera non-icteric  Neck: supple, no masses, no JVD  Respiratory: CTA b/l, good air entry b/l, no wheezing, rhonchi, rales, with normal respiratory effort and no intercostal retractions  Cardiovascular: RRR, normal S1S2, no M/R/G  Gastrointestinal: soft, NTND, no masses palpable, BS normal in all four quadrants, no HSM  Extremities:  no c/c/e  Neurological: Grossly intact  Skin: Normal temperature    MEDICATIONS  (STANDING):  albumin human  5% IVPB 500 milliLiter(s) IV Intermittent every 8 hours  buDESOnide    Inhalation Suspension 0.5 milliGRAM(s) Inhalation every 12 hours  caspofungin IVPB      caspofungin IVPB 50 milliGRAM(s) IV Intermittent every 24 hours  chlorhexidine 0.12% Liquid 15 milliLiter(s) Oral Mucosa every 12 hours  chlorhexidine 2% Cloths 1 Application(s) Topical <User Schedule>  CRRT Treatment    <Continuous>  dextrose 10% + sodium chloride 0.9%. 1000 milliLiter(s) (50 mL/Hr) IV Continuous <Continuous>  fentaNYL   Patch  12 MICROgram(s)/Hr. 1 Patch Transdermal every 48 hours  meropenem  IVPB 1000 milliGRAM(s) IV Intermittent every 12 hours  methylPREDNISolone sodium succinate Injectable 32 milliGRAM(s) IV Push <User Schedule>  minocycline IVPB      minocycline IVPB 200 milliGRAM(s) IV Intermittent every 12 hours  mupirocin 2% Ointment 1 Application(s) Topical every 12 hours  norepinephrine Infusion 0.05 MICROgram(s)/kG/Min (9.33 mL/Hr) IV Continuous <Continuous>  nystatin Powder 1 Application(s) Topical every 12 hours  pantoprazole Infusion 8 mG/Hr (10 mL/Hr) IV Continuous <Continuous>  phenylephrine    Infusion 0.1 MICROgram(s)/kG/Min (1.87 mL/Hr) IV Continuous <Continuous>  PrismaSATE Dialysate BGK 4 / 2.5 5000 milliLiter(s) (1500 mL/Hr) CRRT <Continuous>  PrismaSOL Filtration BGK 4 / 2.5 5000 milliLiter(s) (1250 mL/Hr) CRRT <Continuous>  PrismaSOL Filtration BGK 4 / 2.5 5000 milliLiter(s) (250 mL/Hr) CRRT <Continuous>  propofol Infusion 50 MICROgram(s)/kG/Min (29.9 mL/Hr) IV Continuous <Continuous>  trimethoprim / sulfamethoxazole IVPB 390 milliGRAM(s) IV Intermittent every 12 hours  vasopressin Infusion 0.03 Unit(s)/Min (4.5 mL/Hr) IV Continuous <Continuous>    MEDICATIONS  (PRN):  albuterol/ipratropium for Nebulization 3 milliLiter(s) Nebulizer every 6 hours PRN Shortness of Breath and/or Wheezing      No Known Allergies      LABS:                        7.6    9.86  )-----------( 31       ( 26 Dec 2024 12:33 )             20.6     12-26    137  |  104  |  23  ----------------------------<  219[H]  3.8   |  20[L]  |  0.52    Ca    7.8[L]      26 Dec 2024 12:33  Phos  2.5     12-26  Mg     2.6     12-26    TPro  4.2[L]  /  Alb  3.3  /  TBili  11.2[H]  /  DBili  x   /  AST  1576[H]  /  ALT  496[H]  /  AlkPhos  153[H]  12-26    PT/INR - ( 26 Dec 2024 12:33 )   PT: 27.8 sec;   INR: 2.46 ratio         PTT - ( 26 Dec 2024 12:33 )  PTT:66.2 sec Lactate Dehydrogenase, Serum: 766 U/L (12-26 @ 12:33)    Urinalysis Basic - ( 26 Dec 2024 12:33 )    Color: x / Appearance: x / SG: x / pH: x  Gluc: 219 mg/dL / Ketone: x  / Bili: x / Urobili: x   Blood: x / Protein: x / Nitrite: x   Leuk Esterase: x / RBC: x / WBC x   Sq Epi: x / Non Sq Epi: x / Bacteria: x        RADIOLOGY & ADDITIONAL TESTS:  Studies reviewed.   Hematology Follow-up    INTERVAL HPI/OVERNIGHT EVENTS:  Patient seen and evaluated at bedside, remains critically ill, on multiple pressors, sedation, CRRt.     VITAL SIGNS:  T(F): 97.5 (12-26-24 @ 11:00)  HR: 97 (12-26-24 @ 13:30)  BP: 107/53 (12-26-24 @ 07:00)  RR: 26 (12-26-24 @ 13:30)  SpO2: 100% (12-26-24 @ 13:30)  Wt(kg): --    PHYSICAL EXAM:  GENERAL: ill appearing   HEAD:  Atraumatic, Normocephalic  EYES: EOMI, PERRLA, conjunctiva and sclera clear  CHEST/LUNG: Clear to auscultation  HEART: Regular rate and rhythm  EXTREMITIES:  LE edema   SKIN: No rashes or lesions      MEDICATIONS  (STANDING):  albumin human  5% IVPB 500 milliLiter(s) IV Intermittent every 8 hours  buDESOnide    Inhalation Suspension 0.5 milliGRAM(s) Inhalation every 12 hours  caspofungin IVPB      caspofungin IVPB 50 milliGRAM(s) IV Intermittent every 24 hours  chlorhexidine 0.12% Liquid 15 milliLiter(s) Oral Mucosa every 12 hours  chlorhexidine 2% Cloths 1 Application(s) Topical <User Schedule>  CRRT Treatment    <Continuous>  dextrose 10% + sodium chloride 0.9%. 1000 milliLiter(s) (50 mL/Hr) IV Continuous <Continuous>  fentaNYL   Patch  12 MICROgram(s)/Hr. 1 Patch Transdermal every 48 hours  meropenem  IVPB 1000 milliGRAM(s) IV Intermittent every 12 hours  methylPREDNISolone sodium succinate Injectable 32 milliGRAM(s) IV Push <User Schedule>  minocycline IVPB      minocycline IVPB 200 milliGRAM(s) IV Intermittent every 12 hours  mupirocin 2% Ointment 1 Application(s) Topical every 12 hours  norepinephrine Infusion 0.05 MICROgram(s)/kG/Min (9.33 mL/Hr) IV Continuous <Continuous>  nystatin Powder 1 Application(s) Topical every 12 hours  pantoprazole Infusion 8 mG/Hr (10 mL/Hr) IV Continuous <Continuous>  phenylephrine    Infusion 0.1 MICROgram(s)/kG/Min (1.87 mL/Hr) IV Continuous <Continuous>  PrismaSATE Dialysate BGK 4 / 2.5 5000 milliLiter(s) (1500 mL/Hr) CRRT <Continuous>  PrismaSOL Filtration BGK 4 / 2.5 5000 milliLiter(s) (1250 mL/Hr) CRRT <Continuous>  PrismaSOL Filtration BGK 4 / 2.5 5000 milliLiter(s) (250 mL/Hr) CRRT <Continuous>  propofol Infusion 50 MICROgram(s)/kG/Min (29.9 mL/Hr) IV Continuous <Continuous>  trimethoprim / sulfamethoxazole IVPB 390 milliGRAM(s) IV Intermittent every 12 hours  vasopressin Infusion 0.03 Unit(s)/Min (4.5 mL/Hr) IV Continuous <Continuous>    MEDICATIONS  (PRN):  albuterol/ipratropium for Nebulization 3 milliLiter(s) Nebulizer every 6 hours PRN Shortness of Breath and/or Wheezing      No Known Allergies      LABS:                        7.6    9.86  )-----------( 31       ( 26 Dec 2024 12:33 )             20.6     12-26    137  |  104  |  23  ----------------------------<  219[H]  3.8   |  20[L]  |  0.52    Ca    7.8[L]      26 Dec 2024 12:33  Phos  2.5     12-26  Mg     2.6     12-26    TPro  4.2[L]  /  Alb  3.3  /  TBili  11.2[H]  /  DBili  x   /  AST  1576[H]  /  ALT  496[H]  /  AlkPhos  153[H]  12-26    PT/INR - ( 26 Dec 2024 12:33 )   PT: 27.8 sec;   INR: 2.46 ratio         PTT - ( 26 Dec 2024 12:33 )  PTT:66.2 sec Lactate Dehydrogenase, Serum: 766 U/L (12-26 @ 12:33)    Urinalysis Basic - ( 26 Dec 2024 12:33 )    Color: x / Appearance: x / SG: x / pH: x  Gluc: 219 mg/dL / Ketone: x  / Bili: x / Urobili: x   Blood: x / Protein: x / Nitrite: x   Leuk Esterase: x / RBC: x / WBC x   Sq Epi: x / Non Sq Epi: x / Bacteria: x        RADIOLOGY & ADDITIONAL TESTS:  Studies reviewed.

## 2024-12-26 NOTE — PROGRESS NOTE ADULT - SUBJECTIVE AND OBJECTIVE BOX
INTERVAL EVENTS:  - argatroban gtt stopped  - Albumin 25% 50ml q6  - 500cc albumin 5% once in AM shift  - 2 U PRBC, 2 FFP, 1 plt, 1 cryo  - vomited bloody aspirate when laid flat  - 500 PL bolus ordered in PM shift  - CVC placed  - 500 albumin for NGT output  - repeat Liver U/S > free air in portal vein  - amikacin x 1    [ ] Due to altered mental status/intubation, subjective information were not able to be obtained from the patient. History was obtained, to the extent possible, from review of the chart and collateral sources of information.    OBJECTIVE:    VITALS:  Vital Signs Last 24 Hrs  T(C): 37.4 (25 Dec 2024 19:00), Max: 38 (25 Dec 2024 09:30)  T(F): 99.3 (25 Dec 2024 19:00), Max: 100.4 (25 Dec 2024 09:30)  HR: 98 (26 Dec 2024 02:30) (93 - 122)  BP: 118/53 (25 Dec 2024 14:30) (61/35 - 177/140)  BP(mean): 76 (25 Dec 2024 14:30) (43 - 155)  RR: 17 (26 Dec 2024 02:30) (17 - 58)  SpO2: 99% (26 Dec 2024 02:30) (94% - 100%)    Parameters below as of 25 Dec 2024 23:00  Patient On (Oxygen Delivery Method): ventilator    O2 Concentration (%): 30  Mode: AC/ CMV (Assist Control/ Continuous Mandatory Ventilation)  RR (machine): 14  TV (machine): 480  FiO2: 30  PEEP: 5  ITime: 0.8  MAP: 9.2  PIP: 17    I&O's Summary    24 Dec 2024 07:01  -  25 Dec 2024 07:00  --------------------------------------------------------  IN: 3501.7 mL / OUT: 3208 mL / NET: 293.7 mL    25 Dec 2024 07:01  -  26 Dec 2024 03:27  --------------------------------------------------------  IN: 2454.4 mL / OUT: 2836 mL / NET: -381.6 mL        PHYSICAL EXAM:    NEURO  Exam:  A&Ox0, intermittently opens eyes    RESPIRATORY  Exam:  nonlabored breathing on vent  Mechanical Ventilation: Mode: AC/ CMV (Assist Control/ Continuous Mandatory Ventilation), RR (machine): 14, RR (patient): 32, TV (machine): 480, FiO2: 30, PEEP: 5, ITime: 0.8, MAP: 9.2, PIP: 17    CARDIOVASCULAR  Exam:  poorly perfused, requiring vasopressor support  Cardiac Rhythm:      GI/NUTRITION  Exam:  nondistended, soft, NGT to suction  Diet: NPO      Exam: superificial necrosis of scrotum, nichols in place    ACCESS DEVICES:  [ x] Peripheral IV  [ x] Central Venous Line	[ ] R	[ ] L	[ ] IJ	[ ] Fem	[ ] SC	Placed:   [ ] Arterial Line		[ ] R	[ ] L	[ ] Fem	[ ] Rad	[ ] Ax	Placed:   [ ] PICC:					[ ] Mediport  [x ] Urinary Catheter, Date Placed:   [x] Necessity of urinary, arterial, and venous catheters discussed      LABS:                        8.3    14.29 )-----------( 43       ( 26 Dec 2024 01:18 )             24.3   12-26    136  |  100  |  31[H]  ----------------------------<  100[H]  3.4[L]   |  19[L]  |  0.60    Ca    8.0[L]      26 Dec 2024 01:18  Phos  2.8     12-26  Mg     2.4     12-26    TPro  4.2[L]  /  Alb  3.0[L]  /  TBili  12.4[H]  /  DBili  x   /  AST  3599[H]  /  ALT  885[H]  /  AlkPhos  194[H]  12-26    CAPILLARY BLOOD GLUCOSE      POCT Blood Glucose.: 125 mg/dL (25 Dec 2024 19:13)  POCT Blood Glucose.: 58 mg/dL (25 Dec 2024 17:57)  POCT Blood Glucose.: 97 mg/dL (25 Dec 2024 12:15)  POCT Blood Glucose.: 148 mg/dL (25 Dec 2024 06:31)      MEDICATIONS:   MEDICATIONS  (STANDING):  albumin human 25% IVPB 50 milliLiter(s) IV Intermittent every 6 hours  aspirin  chewable 81 milliGRAM(s) Oral daily  atorvastatin 80 milliGRAM(s) Oral at bedtime  buDESOnide    Inhalation Suspension 0.5 milliGRAM(s) Inhalation every 12 hours  caspofungin IVPB      caspofungin IVPB 50 milliGRAM(s) IV Intermittent every 24 hours  chlorhexidine 0.12% Liquid 15 milliLiter(s) Oral Mucosa every 12 hours  chlorhexidine 2% Cloths 1 Application(s) Topical <User Schedule>  CRRT Treatment    <Continuous>  cycloSPORINE  , modified (GENGRAF) Solution 25 milliGRAM(s) Oral <User Schedule>  dextrose 20%. 500 milliLiter(s) (20 mL/Hr) IV Continuous <Continuous>  fentaNYL   Patch  12 MICROgram(s)/Hr. 1 Patch Transdermal every 48 hours  meropenem  IVPB 1000 milliGRAM(s) IV Intermittent every 12 hours  methylPREDNISolone sodium succinate Injectable 32 milliGRAM(s) IV Push <User Schedule>  midodrine 10 milliGRAM(s) Oral every 8 hours  minocycline IVPB      minocycline IVPB 200 milliGRAM(s) IV Intermittent every 12 hours  mupirocin 2% Ointment 1 Application(s) Topical every 12 hours  norepinephrine Infusion 0.05 MICROgram(s)/kG/Min (9.33 mL/Hr) IV Continuous <Continuous>  nystatin Powder 1 Application(s) Topical every 12 hours  pantoprazole  Injectable 40 milliGRAM(s) IV Push every 12 hours  phenylephrine    Infusion 0.1 MICROgram(s)/kG/Min (1.87 mL/Hr) IV Continuous <Continuous>  PrismaSATE Dialysate BGK 4 / 2.5 5000 milliLiter(s) (1500 mL/Hr) CRRT <Continuous>  PrismaSOL Filtration BGK 4 / 2.5 5000 milliLiter(s) (1250 mL/Hr) CRRT <Continuous>  PrismaSOL Filtration BGK 4 / 2.5 5000 milliLiter(s) (250 mL/Hr) CRRT <Continuous>  vancomycin  IVPB 1000 milliGRAM(s) IV Intermittent once  vasopressin Infusion 0.03 Unit(s)/Min (4.5 mL/Hr) IV Continuous <Continuous>    MEDICATIONS  (PRN):  albuterol/ipratropium for Nebulization 3 milliLiter(s) Nebulizer every 6 hours PRN Shortness of Breath and/or Wheezing

## 2024-12-26 NOTE — PROGRESS NOTE ADULT - SUBJECTIVE AND OBJECTIVE BOX
Transplant Surgery - Multidisciplinary Rounds  --------------------------------------------------------------  OLT   11/15/2024        POD#41  Colectomy 12/7/2024   POD#19  IABP 12/7 removed 12/10  Tracheostomy 12/17/2024    Present:   Patient seen and examined with multidisciplinary Transplant team including Surgeon: Dr. James, Dr. Sorto, JAZZ Fisher, Hepatologist: Dr. Hidalgo and ICU team in AM rounds.   Disciplines not in attendance will be notified of the plan.     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     s/p OLT 11/15 with post op course c/b:  ·	Hypoxia: Reintubated 11/20, extubated 11/24->reintubated 11/24, extubated 11/26, reintubated 12/7, trached 12/17  ·	Ileus   ·	A fib  ·	AMS/Seizure   ·	L brachial DVT  ·	Neutropenia  ·	E coli Bacteremia (12/6)  ·	s/p Coloctomy 12/7.   ·	IABP 12/7, removed 12/10  ·	Ec Faecalis UTI (12/16)  ·	Stenotrophamonas in trach aspirate (12/19)    Interval/Overnight Events:   - On 3 pressors, septic, resuscitation per sicu, amikacin x1  - rising trops, argatroban held due to therapeutic ptt    - CT scrotum viewed by urology no debridement required  - LFT's elevated, US liver showed portal venous gas  - 2u PRBC/1uplt/2FFP/1cryo    Immunosuppression:  -Cyclo by level , MMF HELD, Solu 32  -ongoing monitoring for signs of rejection    TX DATA HERE    ROS: Unable to assess patient is lethargic, s/p tracheostomy    PHYSICAL EXAM:   Constitutional: trach to vent, lethargic  Eyes:  PERRLA  ENMT: nc/at, no thrush   Neck: supple   Respiratory: CTA B/L  Cardiovascular: RRR  Gastrointestinal: incision clean/dry/intact + Ostomy pink, wound vac  Genitourinary: +scrotal edema, Castro in place  Extremities: SCD's in place and working bilaterally, + LE edema  Neurological: trach to vent , sedated   Skin: no rashes, ulcerations, lesions    Transplant Surgery - Multidisciplinary Rounds  --------------------------------------------------------------  OLT   11/15/2024        POD#41  Colectomy 12/7/2024   POD#19  IABP 12/7 removed 12/10  Tracheostomy 12/17/2024    Present:   Patient seen and examined with multidisciplinary Transplant team including Surgeon: Dr. James, Dr. Sorto, JAZZ Fisher/Ale, Hepatologist: Dr. Hidalgo and ICU team in AM rounds.   Disciplines not in attendance will be notified of the plan.     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     s/p OLT 11/15 with post op course c/b:  ·	Hypoxia: Reintubated 11/20, extubated 11/24->reintubated 11/24, extubated 11/26, reintubated 12/7, trached 12/17  ·	Ileus   ·	A fib  ·	AMS/Seizure   ·	L brachial DVT  ·	Neutropenia  ·	E coli Bacteremia (12/6)  ·	s/p Coloctomy 12/7.   ·	IABP 12/7, removed 12/10  ·	Ec Faecalis UTI (12/16)  ·	Stenotrophamonas in trach aspirate (12/19)    Interval/Overnight Events:   - On 3 pressors, septic, resuscitation per sicu, amikacin x1  - rising trops coagulopathy addressed by SICU  - argatroban held due to therapeutic ptt    - CT scrotum viewed by urology no debridement required  - LFT's elevated, US liver showed portal venous gas  - 3u PRBC/2uplt/3FFP/2cryo    Immunosuppression:  -Cyclo by level , MMF HELD, Solu 32  -ongoing monitoring for signs of rejection      MEDICATIONS  (STANDING):  albumin human  5% IVPB 500 milliLiter(s) IV Intermittent every 8 hours  buDESOnide    Inhalation Suspension 0.5 milliGRAM(s) Inhalation every 12 hours  caspofungin IVPB 50 milliGRAM(s) IV Intermittent every 24 hours  caspofungin IVPB      chlorhexidine 0.12% Liquid 15 milliLiter(s) Oral Mucosa every 12 hours  chlorhexidine 2% Cloths 1 Application(s) Topical <User Schedule>  CRRT Treatment    <Continuous>  dextrose 10% + sodium chloride 0.9%. 1000 milliLiter(s) (50 mL/Hr) IV Continuous <Continuous>  fentaNYL   Patch  12 MICROgram(s)/Hr. 1 Patch Transdermal every 48 hours  meropenem  IVPB 1000 milliGRAM(s) IV Intermittent every 12 hours  methylPREDNISolone sodium succinate Injectable 32 milliGRAM(s) IV Push <User Schedule>  minocycline IVPB      minocycline IVPB 200 milliGRAM(s) IV Intermittent every 12 hours  mupirocin 2% Ointment 1 Application(s) Topical every 12 hours  norepinephrine Infusion 0.05 MICROgram(s)/kG/Min (9.33 mL/Hr) IV Continuous <Continuous>  nystatin Powder 1 Application(s) Topical every 12 hours  pantoprazole Infusion 8 mG/Hr (10 mL/Hr) IV Continuous <Continuous>  phenylephrine    Infusion 0.1 MICROgram(s)/kG/Min (1.87 mL/Hr) IV Continuous <Continuous>  PrismaSATE Dialysate BGK 4 / 2.5 5000 milliLiter(s) (1500 mL/Hr) CRRT <Continuous>  PrismaSOL Filtration BGK 4 / 2.5 5000 milliLiter(s) (1250 mL/Hr) CRRT <Continuous>  PrismaSOL Filtration BGK 4 / 2.5 5000 milliLiter(s) (250 mL/Hr) CRRT <Continuous>  propofol Infusion 50 MICROgram(s)/kG/Min (29.9 mL/Hr) IV Continuous <Continuous>  trimethoprim / sulfamethoxazole IVPB 390 milliGRAM(s) IV Intermittent every 12 hours  vasopressin Infusion 0.03 Unit(s)/Min (4.5 mL/Hr) IV Continuous <Continuous>    MEDICATIONS  (PRN):  albuterol/ipratropium for Nebulization 3 milliLiter(s) Nebulizer every 6 hours PRN Shortness of Breath and/or Wheezing      PAST MEDICAL & SURGICAL HISTORY:  Diabetes      Transaminitis      Paroxysmal atrial fibrillation      Depression      BPH (benign prostatic hyperplasia)      Hypertension      Chronic atrial fibrillation      Coronary artery disease      Hepatocellular carcinoma      DM (diabetes mellitus)      HTN (hypertension)      Paroxysmal atrial fibrillation      Cirrhosis      HCC (hepatocellular carcinoma)      History of BPH      History of laparoscopic cholecystectomy      History of lumbar laminectomy      H/O prior ablation treatment      H/O percutaneous left heart catheterization          Vital Signs Last 24 Hrs  T(C): 36.4 (26 Dec 2024 15:00), Max: 37.4 (25 Dec 2024 19:00)  T(F): 97.5 (26 Dec 2024 15:00), Max: 99.3 (25 Dec 2024 19:00)  HR: 93 (26 Dec 2024 16:00) (89 - 112)  BP: 107/53 (26 Dec 2024 07:00) (107/53 - 107/53)  BP(mean): 76 (26 Dec 2024 07:00) (76 - 76)  RR: 16 (26 Dec 2024 16:00) (14 - 48)  SpO2: 95% (26 Dec 2024 16:00) (94% - 100%)    Parameters below as of 26 Dec 2024 15:00  Patient On (Oxygen Delivery Method): ventilator        I&O's Summary    25 Dec 2024 07:01  -  26 Dec 2024 07:00  --------------------------------------------------------  IN: 6569.1 mL / OUT: 4957 mL / NET: 1612.1 mL    26 Dec 2024 07:01  -  26 Dec 2024 16:47  --------------------------------------------------------  IN: 3897.7 mL / OUT: 2375 mL / NET: 1522.7 mL                              7.6    9.86  )-----------( 31       ( 26 Dec 2024 12:33 )             20.6     12-26    137  |  104  |  23  ----------------------------<  219[H]  3.8   |  20[L]  |  0.52    Ca    7.8[L]      26 Dec 2024 12:33  Phos  2.5     12-26  Mg     2.6     12-26    TPro  4.2[L]  /  Alb  3.3  /  TBili  11.2[H]  /  DBili  x   /  AST  1576[H]  /  ALT  496[H]  /  AlkPhos  153[H]  12-26          Culture - Blood (collected 12-24-24 @ 03:30)  Source: .Blood BLOOD  Preliminary Report (12-26-24 @ 07:01):    No growth at 48 Hours    Culture - Bronchial (collected 12-23-24 @ 16:37)  Source: Combi-Cath  Gram Stain (12-23-24 @ 22:50):    No polymorphonuclear cells seen per low power field    No squamous epithelial cells per low power field    No organisms seen per oil power field  Final Report (12-25-24 @ 08:06):    Commensal charity consistent with body site    Culture - Blood (collected 12-23-24 @ 16:03)  Source: .Blood BLOOD  Preliminary Report (12-25-24 @ 20:01):    No growth at 48 Hours    Culture - Body Fluid with Gram Stain (collected 12-20-24 @ 18:23)  Source: Abdominal Fl  Gram Stain (12-21-24 @ 05:56):    polymorphonuclear leukocytes seen    Gram positive cocci in pairs seen    by cytocentrifuge  Final Report (12-25-24 @ 21:41):    Rare Enterococcus faecalis  Organism: Enterococcus faecalis (12-25-24 @ 21:41)  Organism: Enterococcus faecalis (12-25-24 @ 21:41)      ROS: Unable to assess patient is lethargic, s/p tracheostomy    PHYSICAL EXAM:   Constitutional: trach to vent, lethargic  Eyes:  PERRLA  ENMT: nc/at, no thrush   Neck: supple   Respiratory: CTA B/L  Cardiovascular: RRR  Gastrointestinal: incision clean/dry/intact + Ostomy pink, wound vac  Genitourinary: +scrotal edema, Castro in place  Extremities: SCD's in place and working bilaterally, + LE edema  Neurological: trach to vent , sedated   Skin: no rashes, ulcerations, lesions    Transplant Surgery - Multidisciplinary Rounds  --------------------------------------------------------------  OLT   11/15/2024        POD#41  Colectomy 12/7/2024   POD#19  IABP 12/7 removed 12/10  Tracheostomy 12/17/2024    Present:   Patient seen and examined with multidisciplinary Transplant team including Surgeon: Dr. James, Dr. Sorto, JAZZ Fisher/Ale, Hepatologist: Dr. Hidalgo and ICU team in AM rounds.   Disciplines not in attendance will be notified of the plan.     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     s/p OLT 11/15 with post op course c/b:  ·	Hypoxia: Reintubated 11/20, extubated 11/24->reintubated 11/24, extubated 11/26, reintubated 12/7, trached 12/17  ·	Ileus   ·	A fib  ·	AMS/Seizure   ·	L brachial DVT  ·	Neutropenia  ·	E coli Bacteremia (12/6)  ·	s/p Coloctomy 12/7.   ·	IABP 12/7, removed 12/10  ·	Ec Faecalis UTI (12/16)  ·	Stenotrophamonas in trach aspirate (12/19)    Interval/Overnight Events:   - On 3 pressors, septic, resuscitation per sicu, amikacin x1  - rising trops coagulopathy addressed by SICU  - argatroban held due to therapeutic ptt    - CT scrotum viewed by urology no debridement required  - LFT's elevated, US liver showed portal venous gas  - 3u PRBC/2uplt/3FFP/2cryo    Immunosuppression:  -Cyclo by level , MMF HELD, Solu 32  -ongoing monitoring for signs of rejection      MEDICATIONS  (STANDING):  albumin human  5% IVPB 500 milliLiter(s) IV Intermittent every 8 hours  buDESOnide    Inhalation Suspension 0.5 milliGRAM(s) Inhalation every 12 hours  caspofungin IVPB 50 milliGRAM(s) IV Intermittent every 24 hours  caspofungin IVPB      chlorhexidine 0.12% Liquid 15 milliLiter(s) Oral Mucosa every 12 hours  chlorhexidine 2% Cloths 1 Application(s) Topical <User Schedule>  CRRT Treatment    <Continuous>  dextrose 10% + sodium chloride 0.9%. 1000 milliLiter(s) (50 mL/Hr) IV Continuous <Continuous>  fentaNYL   Patch  12 MICROgram(s)/Hr. 1 Patch Transdermal every 48 hours  meropenem  IVPB 1000 milliGRAM(s) IV Intermittent every 12 hours  methylPREDNISolone sodium succinate Injectable 32 milliGRAM(s) IV Push <User Schedule>  minocycline IVPB      minocycline IVPB 200 milliGRAM(s) IV Intermittent every 12 hours  mupirocin 2% Ointment 1 Application(s) Topical every 12 hours  norepinephrine Infusion 0.05 MICROgram(s)/kG/Min (9.33 mL/Hr) IV Continuous <Continuous>  nystatin Powder 1 Application(s) Topical every 12 hours  pantoprazole Infusion 8 mG/Hr (10 mL/Hr) IV Continuous <Continuous>  phenylephrine    Infusion 0.1 MICROgram(s)/kG/Min (1.87 mL/Hr) IV Continuous <Continuous>  PrismaSATE Dialysate BGK 4 / 2.5 5000 milliLiter(s) (1500 mL/Hr) CRRT <Continuous>  PrismaSOL Filtration BGK 4 / 2.5 5000 milliLiter(s) (1250 mL/Hr) CRRT <Continuous>  PrismaSOL Filtration BGK 4 / 2.5 5000 milliLiter(s) (250 mL/Hr) CRRT <Continuous>  propofol Infusion 50 MICROgram(s)/kG/Min (29.9 mL/Hr) IV Continuous <Continuous>  trimethoprim / sulfamethoxazole IVPB 390 milliGRAM(s) IV Intermittent every 12 hours  vasopressin Infusion 0.03 Unit(s)/Min (4.5 mL/Hr) IV Continuous <Continuous>    MEDICATIONS  (PRN):  albuterol/ipratropium for Nebulization 3 milliLiter(s) Nebulizer every 6 hours PRN Shortness of Breath and/or Wheezing      PAST MEDICAL & SURGICAL HISTORY:  Diabetes      Transaminitis      Paroxysmal atrial fibrillation      Depression      BPH (benign prostatic hyperplasia)      Hypertension      Chronic atrial fibrillation      Coronary artery disease      Hepatocellular carcinoma      DM (diabetes mellitus)      HTN (hypertension)      Paroxysmal atrial fibrillation      Cirrhosis      HCC (hepatocellular carcinoma)      History of BPH      History of laparoscopic cholecystectomy      History of lumbar laminectomy      H/O prior ablation treatment      H/O percutaneous left heart catheterization          Vital Signs Last 24 Hrs  T(C): 36.4 (26 Dec 2024 15:00), Max: 37.4 (25 Dec 2024 19:00)  T(F): 97.5 (26 Dec 2024 15:00), Max: 99.3 (25 Dec 2024 19:00)  HR: 93 (26 Dec 2024 16:00) (89 - 112)  BP: 107/53 (26 Dec 2024 07:00) (107/53 - 107/53)  BP(mean): 76 (26 Dec 2024 07:00) (76 - 76)  RR: 16 (26 Dec 2024 16:00) (14 - 48)  SpO2: 95% (26 Dec 2024 16:00) (94% - 100%)    Parameters below as of 26 Dec 2024 15:00  Patient On (Oxygen Delivery Method): ventilator        I&O's Summary    25 Dec 2024 07:01  -  26 Dec 2024 07:00  --------------------------------------------------------  IN: 6569.1 mL / OUT: 4957 mL / NET: 1612.1 mL    26 Dec 2024 07:01  -  26 Dec 2024 16:47  --------------------------------------------------------  IN: 3897.7 mL / OUT: 2375 mL / NET: 1522.7 mL                              7.6    9.86  )-----------( 31       ( 26 Dec 2024 12:33 )             20.6     12-26    137  |  104  |  23  ----------------------------<  219[H]  3.8   |  20[L]  |  0.52    Ca    7.8[L]      26 Dec 2024 12:33  Phos  2.5     12-26  Mg     2.6     12-26    TPro  4.2[L]  /  Alb  3.3  /  TBili  11.2[H]  /  DBili  x   /  AST  1576[H]  /  ALT  496[H]  /  AlkPhos  153[H]  12-26          Culture - Blood (collected 12-24-24 @ 03:30)  Source: .Blood BLOOD  Preliminary Report (12-26-24 @ 07:01):    No growth at 48 Hours    Culture - Bronchial (collected 12-23-24 @ 16:37)  Source: Combi-Cath  Gram Stain (12-23-24 @ 22:50):    No polymorphonuclear cells seen per low power field    No squamous epithelial cells per low power field    No organisms seen per oil power field  Final Report (12-25-24 @ 08:06):    Commensal charity consistent with body site    Culture - Blood (collected 12-23-24 @ 16:03)  Source: .Blood BLOOD  Preliminary Report (12-25-24 @ 20:01):    No growth at 48 Hours    Culture - Body Fluid with Gram Stain (collected 12-20-24 @ 18:23)  Source: Abdominal Fl  Gram Stain (12-21-24 @ 05:56):    polymorphonuclear leukocytes seen    Gram positive cocci in pairs seen    by cytocentrifuge  Final Report (12-25-24 @ 21:41):    Rare Enterococcus faecalis  Organism: Enterococcus faecalis (12-25-24 @ 21:41)  Organism: Enterococcus faecalis (12-25-24 @ 21:41)      ROS: Unable to assess patient is lethargic, s/p tracheostomy    PHYSICAL EXAM:   Constitutional: trach to vent, lethargic  Eyes:  PERRLA  ENMT: nc/at, no thrush   Neck: supple   Respiratory: CTA B/L  Cardiovascular: RRR  Gastrointestinal: incision clean/dry/intact + Ostomy pink, wound vac, ALYSSA x2 SS fluid  Genitourinary: +scrotal edema, Castro in place  Extremities: SCD's in place and working bilaterally, + LE edema  Neurological: trach to vent , sedated   Skin: no rashes, ulcerations, lesions

## 2024-12-26 NOTE — PROGRESS NOTE ADULT - ASSESSMENT
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   s/p OLT 11/15 with complicated post op course    [] Septic shock/Cardiogenic shock  [] s/p Colectomy 12/7   [] RTOR for closure and Ileostomy creation   [] IAPB 12/7- removed 12/10:   - E coli Bacteremia (12/6)  Received Tobra x 1 12/6 .   - EC faecalis asc fluid ( 12/20)  - Ec faecalis UTI (12/16)  - Tx ID following - c/w Christopher/Caspo/Minocycline, vanco by level  - Amikacin x1 12/26  - Neutropenia: received Neupogen 480mcg x2  - MORAIMA: CRRT started 12/7, stopped 12/15, resumed CRRT 12/23  - AMS; CT Head neg  - Hold diuretics   - 12/23 repeat blood cultures ngtd  - CT chest/Abd/pelvis: GGO, splenic infarcts, ileus  - S/P tracheotomy 12/17    [] s/p OLT POD #41  - LFT's uptrending, Liver US: portal venous gas  - Diet: increase TFs as needed  - Pain management: avoid narcotics  - Strict I&Os, nichols, wound vac placed 12/10   - US: L brachial DVT   - wound vac exchange for ileostomy (12/13)  - HIT panel neg (12/14)    [ ] Immunosuppression  - Tacrolimus stopped 11/18 in setting of AMS/Seizure, Started Cyclo 12/17  - Cyclo by level, MMF HELD, Solumedrol 32 mg daily  - PPx: Gancyclovir (HELD) in setting of thrombocytopenia; repeat CMV PCR pending     [] lleus   -@ home on Linzess  - imaging with distended colon (likely opioid induced)  - s/p Neostigmine x 2  - s/p Relistor, last dose 12/1  - s/p colectomy with end ileostomy  (see above)  - Daily AXR     [] CMV viremia  - d/c gancylovir due to thrombocytopenia  - CMV PCR (12/11 and 12/16): neg, repeat CMV pending from 12/24    [ ] AMS  - Reintubated 11/20 Aspiration/hypoxia, extubated 11/24->[id number]/24--> extubated 11/26 -> [id number]/7 -> tracheostomy 12/17  - EEG 11/30 negative, Neurology following  - BH following   - off tacro  - on keppra  -CT Head No Cont (12/20): Age-indeterminate posterior left frontal lobe infarct.   -CT Head (12/25): Evolving subacute infarct in the left supramarginal gyrus, recommend MRI.    [ ] HTN/ pAFib  [ ] coronary vasospasm vs posterior wall MI  - EKG 12/24 c/f ST depression, rising troponins: 1800s  - started argatroban per cards with ptt goal 60-80  - Heparin gtt 12/19-21  - Cards- plan for PCI prior to DC   - Off Amiodarone, off dobutamine  - c/w metoprolol  - Dr. 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   s/p OLT 11/15 with complicated post op course    [] Septic shock/Cardiogenic shock  [] s/p Colectomy 12/7   [] RTOR for closure and Ileostomy creation   [] IAPB 12/7- removed 12/10:   - E coli Bacteremia (12/6)  Received Tobra x 1 12/6 .   - EC faecalis asc fluid ( 12/20)  - Ec faecalis UTI (12/16)  - Tx ID following - completed Erythromycin, and vanco  - Amikacin x1 12/26  - Bactrim  - Neutropenia: received Neupogen 480mcg x2  - MORAIMA: CRRT started 12/7, stopped 12/15, resumed CRRT 12/23  - AMS; CT Head neg  - Hold diuretics   - 12/23 repeat blood cultures ngtd  - 12/23 CT chest/Abd/pelvis: GGO, splenic infarcts, ileus  - S/P tracheotomy 12/17  - Upper GI Bleed for Urgent Scope today  - Correct coagulopathy per SICU    [] s/p OLT POD #41  - LFT's downtrending from 12/25, Liver US: tortuous common hepatic artery with elevated peak systolic velocities up to 635 cm/s, not noted on prior study. perihepatic collection larger but, similar in appearance  - Diet: increase TFs as needed  - Pain management: avoid narcotics  - Strict I&Os, nichols, wound vac placed 12/10   - US: L brachial DVT   - wound vac exchange for ileostomy (12/13)  - HIT panel neg (12/14)    [ ] Immunosuppression  - Tacrolimus stopped 11/18 in setting of AMS/Seizure, Started Cyclo 12/17  - Cyclo by level, MMF HELD, Solumedrol 32 mg daily  - PPx: Gancyclovir (HELD) in setting of thrombocytopenia; repeat CMV PCR pending     [] lleus   -@ home on Linzess  - imaging with distended colon (likely opioid induced)  - s/p Neostigmine x 2  - s/p Relistor, last dose 12/1  - s/p colectomy with end ileostomy  (see above)  - Daily AXR     [] CMV viremia  - d/c gancylovir due to thrombocytopenia  - CMV PCR (12/11 and 12/16): neg, positive CMVPCR on 12/23 repeat CMV pending from 12/24    [ ] AMS  - Reintubated 11/20 Aspiration/hypoxia, extubated 11/24->[id number]/24--> extubated 11/26 -> [id number]/7 -> tracheostomy 12/17  - EEG 11/30 negative, Neurology following  - BH following   - off tacro  - on keppra  -CT Head No Cont (12/20): Age-indeterminate posterior left frontal lobe infarct.   -CT Head (12/25): Evolving subacute infarct in the left supramarginal gyrus, recommend MRI.  - MRI Head when able    [ ] HTN/ pAFib  [ ] coronary vasospasm vs posterior wall MI  - EKG 12/24 c/f ST depression, rising troponins: 1800s  - d/c argatroban due to bleeding  - Heparin gtt 12/19-21  - Cards- plan for PCI prior to DC   - Off Amiodarone, off dobutamine  - Off metoprolol for soft bp.  - Dr. 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   s/p OLT 11/15 with complicated post op course    [] Septic shock/Cardiogenic shock  [] s/p Colectomy 12/7   [] RTOR for closure and Ileostomy creation   [] IAPB 12/7- removed 12/10:   - E coli Bacteremia (12/6)  Received Tobra x 1 12/6 .   - EC faecalis asc fluid ( 12/20)  - Ec faecalis UTI (12/16)  - Tx ID following - completed Erythromycin, and vanco  - Amikacin x1 12/26  - Bactrim  - Neutropenia: received Neupogen 480mcg x2  - MORAIMA: CRRT started 12/7, stopped 12/15, resumed CRRT 12/23  - AMS; CT Head neg  - Hold diuretics   - 12/23 repeat blood cultures ngtd  - 12/23 CT chest/Abd/pelvis: GGO, splenic infarcts, ileus  - S/P tracheotomy 12/17  - Upper GI Bleed for Urgent Scope today  - Correct coagulopathy per SICU    [] s/p OLT POD #41  - LFT's downtrending from 12/25, Liver US: tortuous common hepatic artery with elevated peak systolic velocities up to 635 cm/s, not noted on prior study. growing perihepatic fluid collection  - Diet: increase TFs as needed  - Pain management: avoid narcotics  - Strict I&Os, nichols, wound vac placed 12/10   - US: L brachial DVT   - wound vac exchange for ileostomy (12/13)  - HIT panel neg (12/14)    [ ] Immunosuppression  - Tacrolimus stopped 11/18 in setting of AMS/Seizure, Started Cyclo 12/17  - Cyclo by level, MMF HELD, Solumedrol 32 mg daily  - PPx: Gancyclovir (HELD) in setting of thrombocytopenia; repeat CMV PCR pending     [] lleus   -@ home on Linzess  - imaging with distended colon (likely opioid induced)  - s/p Neostigmine x 2  - s/p Relistor, last dose 12/1  - s/p colectomy with end ileostomy  (see above)  - Daily AXR     [] CMV viremia  - d/c gancylovir due to thrombocytopenia  - CMV PCR (12/11 and 12/16): neg, positive CMVPCR on 12/23 repeat CMV pending from 12/24    [ ] AMS  - Reintubated 11/20 Aspiration/hypoxia, extubated 11/24->lgtbtzpvbyk97/24--> extubated 11/26 -> fnjdyjejtvx88/7 -> tracheostomy 12/17  - EEG 11/30 negative, Neurology following  - BH following   - off tacro  - on keppra  -CT Head No Cont (12/20): Age-indeterminate posterior left frontal lobe infarct.   -CT Head (12/25): Evolving subacute infarct in the left supramarginal gyrus, recommend MRI.  - MRI Head when able    [ ] HTN/ pAFib  [ ] coronary vasospasm vs posterior wall MI  - EKG 12/24 c/f ST depression, rising troponins: 1800s  - d/c argatroban due to bleeding  - Heparin gtt 12/19-21  - Cards- plan for PCI prior to DC   - Off Amiodarone, off dobutamine  - Off metoprolol for soft bp.  - Dr. 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   s/p OLT 11/15 with complicated post op course    [] Septic shock/Cardiogenic shock  [] s/p Colectomy 12/7   [] RTOR for closure and Ileostomy creation   [] IAPB 12/7- removed 12/10:   - E coli Bacteremia (12/6)  Received Tobra x 1 12/6 .   - EC faecalis asc fluid ( 12/20)  - Ec faecalis UTI (12/16)  - Tx ID following - Christopher/Caspo/Bactrim/Minocycline  - Amikacin x1 12/26  - Neutropenia: received Neupogen 480mcg x2  - MORAIMA: CRRT started 12/7, stopped 12/15, resumed CRRT 12/23  - AMS; CT Head neg  - Hold diuretics   - 12/23 repeat blood cultures ngtd  - 12/23 CT chest/Abd/pelvis: GGO, splenic infarcts, ileus  - S/P tracheotomy 12/17  - Upper GI Bleed for Urgent Scope today  - Correct coagulopathy per SICU    [] s/p OLT POD #41  - LFT's downtrending from 12/25, Liver US: tortuous common hepatic artery with elevated peak systolic velocities up to 635 cm/s, not noted on prior study. growing perihepatic fluid collection  - Diet: increase TFs as needed  - Pain management: avoid narcotics  - Strict I&Os, nichols, wound vac placed 12/10   - US: L brachial DVT   - wound vac exchange for ileostomy (12/13)  - HIT panel neg (12/14)    [ ] Immunosuppression  - Tacrolimus stopped 11/18 in setting of AMS/Seizure, Started Cyclo 12/17  - Cyclo by level, MMF HELD, Solumedrol 32 mg daily  - PPx: Gancyclovir (HELD) in setting of thrombocytopenia; repeat CMV PCR pending     [] lleus   -@ home on Linzess  - imaging with distended colon (likely opioid induced)  - s/p Neostigmine x 2  - s/p Relistor, last dose 12/1  - s/p colectomy with end ileostomy  (see above)  - Daily AXR     [] CMV viremia  - d/c gancylovir due to thrombocytopenia  - CMV PCR (12/11 and 12/16): neg, positive CMVPCR on 12/23 repeat CMV pending from 12/24    [ ] AMS  - Reintubated 11/20 Aspiration/hypoxia, extubated 11/24->uikdcqwbzur85/24--> extubated 11/26 -> [id number]/7 -> tracheostomy 12/17  - EEG 11/30 negative, Neurology following  - BH following   - off tacro  - on keppra  -CT Head No Cont (12/20): Age-indeterminate posterior left frontal lobe infarct.   -CT Head (12/25): Evolving subacute infarct in the left supramarginal gyrus, recommend MRI.  - MRI Head when able    [ ] HTN/ pAFib  [ ] coronary vasospasm vs posterior wall MI  - EKG 12/24 c/f ST depression, rising troponins: 1800s  - d/c argatroban and aspirin due to bleeding  - Heparin gtt 12/19-21  - Cards- plan for PCI prior to DC   - Off Amiodarone, off dobutamine  - Off metoprolol for soft bp.  - Dr. Quintanilla following    [ ] DM  - ISS     []Scrotal abscess   - US (12/12): 2.1 x0.9 x 3.2 cm focal, right testicle- possible abscess (12/12)  - Urology recs: CT scrotum review no debridement required  - 12/23 US doppler scrotum: no arterial flow, limited venous flow, bl hydrocele  - 12/15 CT CAP no acute changes

## 2024-12-26 NOTE — PROGRESS NOTE ADULT - ASSESSMENT
Patient with prolonged hospital course. In brief, he is a 73 y/o male with PMHx of non-obstructive CAD (mid-RCA ~50%, mid-LCx ~50-60%), HTN, DM, paroxysmal Afib s/p ablation (not on AC due to ?thrombocytopenia), GONZALES cirrhosis who underwent a OLT on 11/15/24. Post-op course was complicated by ischemic bowel s/p total colectomy (12/7), septic shock 2/2 E.coli bacteremia, cardiogenic shock (briefly on dobutamine and IABP 12/7 - 12/10), multiple intubations (now on tracheostomy to vent), renal failure requiring CRRT. Noted with developing ST depressions and continually rising troponins on 12/24 for which cardiology are consulted.    #NSTEMI  - Patient is critically ill in multi-organ failure, on multiple pressors  - Today is NSR, ST-depressions have improved with normalization of HR  - Has previously been on heparin ggt this admission for presumed ACS/NSTEMI, was stopped due to acute drop in hgb. Therapeutic AC with argatroban (i/s/o severe thrombocytopenia) was re-introduced on 12/24 in the evening, patient again with a drop in Hgb and concern for UGIB. AC presently on hold.   - Troponin trend noted as follows (i/s/o shock state and ESRD):  220 -> 256 -> 274 -> 321 -> 406 -> 443 -> 454 -> 478 -> 604 -> 653 -> 721 -> (12/24) 728 -> 851 -> 975 --> (....) --> 1904 (peak) --> 1849 --> 1909  - Elevated troponins and STD on ECG likely in the setting of demand ischemia, though given history of CAD and rapid uptrend cannout rule out ACS as driving factor  - Given hemodynamic instability, severe thrombocytopenia limiting DAPT, patient not a candidate for LHC +/- PCI at this time  - Additionally, while he has several indications for therapeutic AC (afib, NSTEMI, DVTs), patient has shown that he is unable to tolerate systemic AC at this time without significant bleeding  - Would hold argatroban at this time given GIB  - Would recommend hematology re-consultation regarding the benefits of re-introduction of AC i/s/o GIB, multiple DVTs, and severe thrombocytopenia (SAVANAH negative)  - ASA 81 mg QD currently on hold i/s/o GIB. Also on hold due to plt < 30k  - Once bleeding resolves, would re-introduce ASA 81 mg QD, hold for plt < 30k  - C/w atorvastatin 80 mg  - Would re-introduce beta blocker (previously on metoprolol tartrate 12.5 mg q8) if BP allows  - Agree with weaning of levophed in favor of phenylepherine for BP support    At this time there are no further cardiac interventions available to the patient. Would defer the re-introduction of systemic AC to the primary team +/- consultation with hematology service. Not a candidate for LHC/PCI. Would recommend adressing underlying drivers of demand ischemia (sepsis, anemia, hypotension, etc.) at this time to optimize cardiac function. Would not trend troponin further at this time. Cardiology to sign-off, please re-call if further cardiology concerns arise Patient with prolonged hospital course. In brief, he is a 75 y/o male with PMHx of non-obstructive CAD (mid-RCA ~50%, mid-LCx ~50-60%), HTN, DM, paroxysmal Afib s/p ablation (not on AC due to ?thrombocytopenia), GONZALES cirrhosis who underwent a OLT on 11/15/24. Post-op course was complicated by ischemic bowel s/p total colectomy (12/7), septic shock 2/2 E.coli bacteremia, cardiogenic shock (briefly on dobutamine and IABP 12/7 - 12/10), multiple intubations (now on tracheostomy to vent), renal failure requiring CRRT. Noted with developing ST depressions and continually rising troponins on 12/24 for which cardiology are consulted.    #NSTEMI  - Patient is critically ill in multi-organ failure, on multiple pressors  - Today is NSR, ST-depressions have improved with normalization of HR  - Has previously been on heparin ggt this admission for presumed ACS/NSTEMI, was stopped due to acute drop in hgb. Therapeutic AC with argatroban (i/s/o severe thrombocytopenia) was re-introduced on 12/24 in the evening, patient again with a drop in Hgb and concern for UGIB. AC presently on hold.   - Troponin trend noted as follows (i/s/o shock state and ESRD):  220 -> 256 -> 274 -> 321 -> 406 -> 443 -> 454 -> 478 -> 604 -> 653 -> 721 -> (12/24) 728 -> 851 -> 975 --> (....) --> 1904 (peak) --> 1849 --> 1909  - Elevated troponins and STD on ECG likely in the setting of demand ischemia, though given history of CAD and rapid uptrend cannout rule out ACS as driving factor  - Given hemodynamic instability, severe thrombocytopenia limiting DAPT, patient not a candidate for LHC +/- PCI at this time  - Additionally, while he has several indications for therapeutic AC (afib, NSTEMI, DVTs), patient has shown that he is unable to tolerate systemic AC at this time without significant bleeding  - Would hold argatroban at this time given GIB  - Would recommend hematology re-consultation regarding the benefits of re-introduction of AC i/s/o GIB, multiple DVTs, and severe thrombocytopenia (SAVANAH negative)  - ASA 81 mg QD currently on hold i/s/o GIB. Also on hold due to plt < 30k  - Once bleeding resolves, would re-introduce ASA 81 mg QD, hold for plt < 30k  - C/w atorvastatin 80 mg  - Would re-introduce beta blocker (previously on metoprolol tartrate 12.5 mg q8) if BP allows  - Agree with weaning of levophed in favor of phenylepherine for BP support    At this time there are no further cardiac interventions available to the patient. Would defer the re-introduction of systemic AC to the primary team +/- consultation with hematology service. Not a candidate for LHC/PCI. Would recommend adressing underlying drivers of demand ischemia (sepsis, anemia, hypotension, etc.) at this time to optimize cardiac function. Would not trend troponin further at this time. Cardiology to sign-off, please re-call if further cardiology concerns arise    This patient was seen and examined personally by me and the plan was discussed with the fellow and/or resident above. Amendments were made as necessary to the above. Agree with the excellent note and plan above. Med mgmt as above, focus on underlying cause mitigation for demand ischemia, ongoing argatroban.     30 minutes were spent on this encounter for extensive review of medical record details including labs and/or imaging studies and/or adjacent care team and consultant records, as well as review and reconciliation of current medications. Time was spent on obtaining a history, performing physical examination of patient, and answering patient and/or family questions regarding plan of care. Time was also spent discussing plan of care with patient’s other care team members including primary and consulting teams. Time also was spent on documentation of this encounter into the EHR.    Ludwig Vallejo MD, MPhil, [facility]  Cardiologist, [facility]  ; Chemo [employer] and [facility]  Email: [email or web address] on-service contact/call information, go to [email or web address] and use "[email or web address]" to login.  Outpatient Cardiology appointments, call [phone] to arrange with a colleague; I do not have outpatient Cardiology clinic.

## 2024-12-26 NOTE — PROGRESS NOTE ADULT - NS ATTEND AMEND GEN_ALL_CORE FT
74M w/ PMHx of HTN, DM, AF, BPH, MASH cirrhosis and 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 Patient's post-op course has been c/b multiple reintubation, required total colectomy w/ ileostomy, and IABP placement 2/2 cardiogenic shock with subsequent removal.   S/P prolonged intubation, s/p trached and PEG    Dev UGI bleed requiring multiple U of PRBC/blood products transfusion, off Argatroban drip    Neuro exam remains poor, another CTH neg for acute finding  On full vent support, FiO2 remains on 30%, CXR unchanged  Fluctuating vasopressor requirement, tachycardia improved with lower doses of Levo, on Aldo and physiologic dose Vaso  Pt dev UGI bleed on Argatroban, INR and PTT remains therapeutic without AC, will not start AC again for now.  PPI drip for UGI bleed  Colloid and crystalloid resuscitation for hypovolemia, add flow track to assess volume responsiveness  Abx Christopher, Minocycline, Bactrim, Caspo, resp culture Stenotrophomonas, one dose Vanco for Vanco level 15, one dose Amikacin given  CT of scrotum unremarkable, no intervention as per , can DC Castro  CRRT    ISS/Lantus dose adjustment  Mech DVT ppx, transfusion to keep Hb > 8, products based on TEG    Family kept updated  Overal  Prognosis poor    Plan coordinated with transplant team

## 2024-12-27 ENCOUNTER — APPOINTMENT (OUTPATIENT)
Dept: MRI IMAGING | Facility: HOSPITAL | Age: 74
End: 2024-12-27

## 2024-12-27 NOTE — PROGRESS NOTE ADULT - NS ATTEND AMEND GEN_ALL_CORE FT
74M w/ PMHx of HTN, DM, AF, BPH, MASH cirrhosis and 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 Patient's post-op course has been c/b multiple reintubation, required total colectomy w/ ileostomy, and IABP placement 2/2 cardiogenic shock with subsequent removal.   S/P prolonged intubation, s/p trached and PEG    More awake and alert, follows  Vent adj for resp alkalosis, FiO2 remains on 30%, CXR unchanged  Fluctuating vasopressor requirement, tachycardia improved with lower doses of Levo, on Aldo and physiologic dose Vaso  Start trophic feed, LFT and lactate decreased  PPI drip for UGI bleed  Standing colloid resuscitation for high ileostomy output.  Abx Christopher, Minocycline, Bactrim, Caspo, resp culture Stenotrophomonas, Vanco dose based on level, hold today for level of 18.3  CT of scrotum unremarkable, no intervention as per , off Castro  CRRT with net evel, colloid and blood transfusion not counted    ISS/Lantus dose adjustment  Mech DVT ppx, transfusion to keep Hb > 8, products based on TEG    Family kept updated  Overall  Prognosis remains poor    Plan coordinated with transplant team

## 2024-12-27 NOTE — PROGRESS NOTE ADULT - ASSESSMENT
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 Post-op course c/b worsening mental status and acute hypoxic respiratory failure requiring multiple intubations/extubations, currently extubated (as of 11/26/24) and colonic ileus s/p several rounds of Relistor and Neostigmine with NGT and rectal tube currently in place now with septic shock of unclear etiology. Transplant nephrology consulted for metabolic acidosis and MORAIMA.    1. s/p OLT 11/15/24 with oliguric MORAIMA in setting of septic shock.  Likely hemodynamically mediated in setting of cardiogenic and septic shock on multiple vasopressors and prior IABP.   CT AP non-con: Bilateral renal cysts including cysts with peripheral calcification in the left kidney.  Initiated on CRRT 12/7/24- 12/15/24 at 1AM stopped. s/p IHD 12/18/24 however required levophed.   Now back on CRRT, remains anuric will continue.

## 2024-12-27 NOTE — ADVANCED PRACTICE NURSE CONSULT - RECOMMEDATIONS
Will recommend:  1. Monitor output.  2. Empty pouch when 1/3-1/2 full   3. Change pouching system every 3-4 days & prn leakage  4. Contact ostomy specialists if questions, concerns/issues .  5. Supplies: Tioga 2 1/4" Ceraplus flat skin barrier (#21034), Lianne 2 1/4" drainable pouch (#65050); Accessory products:  stoma paste #[id number], stoma powder (#3915) & Cavilon No sting barrier film wipe (#1323)  Will f/u for ostomy education when appropriate. Pt remains acute requiring SICU care.

## 2024-12-27 NOTE — PROGRESS NOTE ADULT - SUBJECTIVE AND OBJECTIVE BOX
Transplant Surgery - Multidisciplinary Rounds  --------------------------------------------------------------  OLT   11/15/2024        POD#42  Colectomy 12/7/2024   POD#20  IABP 12/7 removed 12/10  Tracheostomy 12/17/2024    Present:   Patient seen and examined with multidisciplinary Transplant team including Surgeon: Dr. James, Dr. Sorot, JAZZ Fisher/Ale, Hepatologist: Dr. Hidalgo and ICU team in AM rounds.   Disciplines not in attendance will be notified of the plan.     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     s/p OLT 11/15 with post op course c/b:  ·	Hypoxia: Reintubated 11/20, extubated 11/24->reintubated 11/24, extubated 11/26, reintubated 12/7, trached 12/17  ·	Ileus   ·	A fib  ·	AMS/Seizure   ·	L brachial DVT  ·	Neutropenia  ·	E coli Bacteremia (12/6)  ·	s/p Coloctomy 12/7.   ·	IABP 12/7, removed 12/10  ·	Ec Faecalis UTI (12/16)  ·	Stenotrophamonas in trach aspirate (12/19)    Interval/Overnight Events:   - remains on 3 pressors (minimal levo 0.02, vaso 0.03, kassandra 2.1)  - demand ischemia, anemic (recieved 3 uPRBC)  - UGIB s/p EGD showing no focal bleeding rather generalized oozing c/w coagulopathy  - recieved 2uFFP, 2u cryo, 2u PLT  - argatroban held due UGIB  - ileostomy with >1L dark bilious output    Immunosuppression:  -Cyclo by level , MMF HELD, Solu 32  -ongoing monitoring for signs of rejection    MEDICATIONS  (STANDING):  albumin human  5% IVPB 500 milliLiter(s) IV Intermittent every 8 hours  buDESOnide    Inhalation Suspension 0.5 milliGRAM(s) Inhalation every 12 hours  calcium gluconate IVPB 2 Gram(s) IV Intermittent once  caspofungin IVPB 50 milliGRAM(s) IV Intermittent every 24 hours  caspofungin IVPB      chlorhexidine 0.12% Liquid 15 milliLiter(s) Oral Mucosa every 12 hours  chlorhexidine 2% Cloths 1 Application(s) Topical <User Schedule>  CRRT Treatment    <Continuous>  dextrose 5% + sodium chloride 0.9%. 1000 milliLiter(s) (50 mL/Hr) IV Continuous <Continuous>  fentaNYL   Patch  12 MICROgram(s)/Hr. 1 Patch Transdermal every 48 hours  meropenem  IVPB 1000 milliGRAM(s) IV Intermittent every 12 hours  methylPREDNISolone sodium succinate Injectable 32 milliGRAM(s) IV Push <User Schedule>  minocycline IVPB      minocycline IVPB 200 milliGRAM(s) IV Intermittent every 12 hours  mupirocin 2% Ointment 1 Application(s) Topical every 12 hours  norepinephrine Infusion 0.05 MICROgram(s)/kG/Min (9.33 mL/Hr) IV Continuous <Continuous>  nystatin Powder 1 Application(s) Topical every 12 hours  pantoprazole Infusion 8 mG/Hr (10 mL/Hr) IV Continuous <Continuous>  phenylephrine    Infusion 0.1 MICROgram(s)/kG/Min (1.87 mL/Hr) IV Continuous <Continuous>  PrismaSATE Dialysate BGK 4 / 2.5 5000 milliLiter(s) (1500 mL/Hr) CRRT <Continuous>  PrismaSOL Filtration BGK 4 / 2.5 5000 milliLiter(s) (1250 mL/Hr) CRRT <Continuous>  PrismaSOL Filtration BGK 4 / 2.5 5000 milliLiter(s) (250 mL/Hr) CRRT <Continuous>  trimethoprim / sulfamethoxazole IVPB 390 milliGRAM(s) IV Intermittent every 12 hours  vasopressin Infusion 0.03 Unit(s)/Min (4.5 mL/Hr) IV Continuous <Continuous>    MEDICATIONS  (PRN):  albuterol/ipratropium for Nebulization 3 milliLiter(s) Nebulizer every 6 hours PRN Shortness of Breath and/or Wheezing      PAST MEDICAL & SURGICAL HISTORY:  Diabetes      Transaminitis      Paroxysmal atrial fibrillation      Depression      BPH (benign prostatic hyperplasia)      Hypertension      Chronic atrial fibrillation      Coronary artery disease      Hepatocellular carcinoma      DM (diabetes mellitus)      HTN (hypertension)      Paroxysmal atrial fibrillation      Cirrhosis      HCC (hepatocellular carcinoma)      History of BPH      History of laparoscopic cholecystectomy      History of lumbar laminectomy      H/O prior ablation treatment      H/O percutaneous left heart catheterization          Vital Signs Last 24 Hrs  T(C): 37 (26 Dec 2024 23:00), Max: 37 (26 Dec 2024 23:00)  T(F): 98.6 (26 Dec 2024 23:00), Max: 98.6 (26 Dec 2024 23:00)  HR: 97 (27 Dec 2024 00:15) (89 - 104)  BP: 107/53 (26 Dec 2024 07:00) (107/53 - 107/53)  BP(mean): 76 (26 Dec 2024 07:00) (76 - 76)  RR: 15 (27 Dec 2024 00:15) (14 - 32)  SpO2: 93% (27 Dec 2024 00:15) (93% - 100%)    Parameters below as of 26 Dec 2024 23:00  Patient On (Oxygen Delivery Method): ventilator    O2 Concentration (%): 30    I&O's Summary    25 Dec 2024 07:01  -  26 Dec 2024 07:00  --------------------------------------------------------  IN: 6569.1 mL / OUT: 4957 mL / NET: 1612.1 mL    26 Dec 2024 07:01  -  27 Dec 2024 00:34  --------------------------------------------------------  IN: 6001.1 mL / OUT: 5337 mL / NET: 664.1 mL                              8.9    11.72 )-----------( 32       ( 26 Dec 2024 17:22 )             25.2     12-26    137  |  105  |  21  ----------------------------<  215[H]  3.9   |  20[L]  |  0.46[L]    Ca    7.7[L]      26 Dec 2024 17:23  Phos  2.9     12-26  Mg     2.5     12-26    TPro  4.0[L]  /  Alb  3.2[L]  /  TBili  12.4[H]  /  DBili  x   /  AST  1434[H]  /  ALT  487[H]  /  AlkPhos  164[H]  12-26    ROS: Unable to assess patient is lethargic, s/p tracheostomy    PHYSICAL EXAM:   Constitutional: trach to vent, lethargic  Eyes:  PERRLA  ENMT: nc/at, no thrush   Neck: supple   Respiratory: CTA B/L  Cardiovascular: RRR  Gastrointestinal: incision clean/dry/intact + Ostomy pink, wound vac, ALYSSA x2 SS fluid  Genitourinary: +scrotal edema, Castro in place  Extremities: SCD's in place and working bilaterally, + LE edema  Neurological: trach to vent , sedated   Skin: no rashes, ulcerations, lesions      Transplant Surgery - Multidisciplinary Rounds  --------------------------------------------------------------  OLT   11/15/2024        POD#42  Colectomy 12/7/2024   POD#20  IABP 12/7 removed 12/10  Tracheostomy 12/17/2024    Present:   Patient seen and examined with multidisciplinary Transplant team including Surgeon: Dr. James, Dr. Sorto, JAZZ Fisher/Ale, Hepatologist: Dr. Hidalgo and ICU team in AM rounds.   Disciplines not in attendance will be notified of the plan.     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     s/p OLT 11/15 with post op course c/b:  ·	Hypoxia: Reintubated 11/20, extubated 11/24->reintubated 11/24, extubated 11/26, reintubated 12/7, trached 12/17  ·	Ileus   ·	A fib  ·	AMS/Seizure   ·	L brachial DVT  ·	Neutropenia  ·	E coli Bacteremia (12/6)  ·	s/p Coloctomy 12/7.   ·	IABP 12/7, removed 12/10  ·	Ec Faecalis UTI (12/16)  ·	Stenotrophamonas in trach aspirate (12/19)  ·	UGIB 12/26    Interval/Overnight Events:   - afebrile, VSS except soft BPs remains on 3 pressors (minimal levo 0.02, vaso 0.03, kassandra 2.1)  - UGIB s/p EGD showing no focal bleeding rather generalized oozing c/w coagulopathy  - recieved 2uFFP, 2u cryo, 2u PLT  - demand ischemia, anemic received 3 uPRBC  - argatroban held due UGIB  - ileostomy with >1L dark bilious output    Immunosuppression:  - Cyclo HELD , MMF HELD, Solu 32  -ongoing monitoring for signs of rejection      MEDICATIONS  (STANDING):  albumin human  5% IVPB 250 milliLiter(s) IV Intermittent every 8 hours  buDESOnide    Inhalation Suspension 0.5 milliGRAM(s) Inhalation every 12 hours  calcium gluconate IVPB 2 Gram(s) IV Intermittent every 6 hours  caspofungin IVPB 50 milliGRAM(s) IV Intermittent every 24 hours  caspofungin IVPB      chlorhexidine 0.12% Liquid 15 milliLiter(s) Oral Mucosa every 12 hours  chlorhexidine 2% Cloths 1 Application(s) Topical <User Schedule>  CRRT Treatment    <Continuous>  insulin lispro (ADMELOG) corrective regimen sliding scale   SubCutaneous every 6 hours  meropenem  IVPB 1000 milliGRAM(s) IV Intermittent every 12 hours  methylPREDNISolone sodium succinate Injectable 32 milliGRAM(s) IV Push <User Schedule>  minocycline IVPB      minocycline IVPB 200 milliGRAM(s) IV Intermittent every 12 hours  mupirocin 2% Ointment 1 Application(s) Topical every 12 hours  norepinephrine Infusion 0.05 MICROgram(s)/kG/Min (9.33 mL/Hr) IV Continuous <Continuous>  nystatin Powder 1 Application(s) Topical every 12 hours  pantoprazole Infusion 8 mG/Hr (10 mL/Hr) IV Continuous <Continuous>  phenylephrine    Infusion 0.1 MICROgram(s)/kG/Min (1.87 mL/Hr) IV Continuous <Continuous>  Phoxillum Filtration BK 4 / 2.5 5000 milliLiter(s) (1250 mL/Hr) CRRT <Continuous>  Phoxillum Filtration BK 4 / 2.5 5000 milliLiter(s) (250 mL/Hr) CRRT <Continuous>  PrismaSATE Dialysate BGK 4 / 2.5 5000 milliLiter(s) (1500 mL/Hr) CRRT <Continuous>  trimethoprim / sulfamethoxazole IVPB 390 milliGRAM(s) IV Intermittent every 12 hours  vasopressin Infusion 0.03 Unit(s)/Min (4.5 mL/Hr) IV Continuous <Continuous>    MEDICATIONS  (PRN):  albuterol/ipratropium for Nebulization 3 milliLiter(s) Nebulizer every 6 hours PRN Shortness of Breath and/or Wheezing      PAST MEDICAL & SURGICAL HISTORY:  Diabetes      Transaminitis      Paroxysmal atrial fibrillation      Depression      BPH (benign prostatic hyperplasia)      Hypertension      Chronic atrial fibrillation      Coronary artery disease      Hepatocellular carcinoma      DM (diabetes mellitus)      HTN (hypertension)      Paroxysmal atrial fibrillation      Cirrhosis      HCC (hepatocellular carcinoma)      History of BPH      History of laparoscopic cholecystectomy      History of lumbar laminectomy      H/O prior ablation treatment      H/O percutaneous left heart catheterization          Vital Signs Last 24 Hrs  T(C): 37 (27 Dec 2024 15:00), Max: 37.1 (27 Dec 2024 11:00)  T(F): 98.6 (27 Dec 2024 15:00), Max: 98.8 (27 Dec 2024 11:00)  HR: 96 (27 Dec 2024 16:00) (89 - 101)  BP: --  BP(mean): --  RR: 20 (27 Dec 2024 16:00) (15 - 34)  SpO2: 98% (27 Dec 2024 16:00) (93% - 100%)    Parameters below as of 27 Dec 2024 15:00      O2 Concentration (%): 30    I&O's Summary    26 Dec 2024 07:01  -  27 Dec 2024 07:00  --------------------------------------------------------  IN: 8716.5 mL / OUT: 7441 mL / NET: 1275.5 mL    27 Dec 2024 07:01  -  27 Dec 2024 16:44  --------------------------------------------------------  IN: 1778.5 mL / OUT: 1396 mL / NET: 382.5 mL                              8.8    8.89  )-----------( 44       ( 27 Dec 2024 12:10 )             24.4     12-27    136  |  104  |  19  ----------------------------<  219[H]  3.9   |  21[L]  |  0.39[L]    Ca    7.6[L]      27 Dec 2024 12:10  Phos  2.0     12-27  Mg     2.3     12-27    TPro  4.2[L]  /  Alb  3.0[L]  /  TBili  13.8[H]  /  DBili  x   /  AST  613[H]  /  ALT  320[H]  /  AlkPhos  147[H]  12-27          Culture - Body Fluid with Gram Stain (collected 12-26-24 @ 19:36)  Source: Body Fluid  Gram Stain (12-27-24 @ 07:04):    polymorphonuclear leukocytes seen    No organisms seen    by cytocentrifuge    Culture - Body Fluid with Gram Stain (collected 12-26-24 @ 19:33)  Source: Body Fluid  Gram Stain (12-27-24 @ 07:00):    polymorphonuclear leukocytes seen    Gram positive cocci in pairs seen    by cytocentrifuge    Culture - Blood (collected 12-24-24 @ 03:30)  Source: .Blood BLOOD  Preliminary Report (12-27-24 @ 07:00):    No growth at 72 Hours    Culture - Bronchial (collected 12-23-24 @ 16:37)  Source: Combi-Cath  Gram Stain (12-23-24 @ 22:50):    No polymorphonuclear cells seen per low power field    No squamous epithelial cells per low power field    No organisms seen per oil power field  Final Report (12-25-24 @ 08:06):    Commensal charity consistent with body site    Culture - Blood (collected 12-23-24 @ 16:03)  Source: .Blood BLOOD  Preliminary Report (12-26-24 @ 20:01):    No growth at 72 Hours    Culture - Body Fluid with Gram Stain (collected 12-20-24 @ 18:23)  Source: Abdominal Fl  Gram Stain (12-21-24 @ 05:56):    polymorphonuclear leukocytes seen    Gram positive cocci in pairs seen    by cytocentrifuge  Final Report (12-25-24 @ 21:41):    Rare Enterococcus faecalis  Organism: Enterococcus faecalis (12-25-24 @ 21:41)  Organism: Enterococcus faecalis (12-25-24 @ 21:41)      ROS: Unable to assess patient is lethargic, s/p tracheostomy    PHYSICAL EXAM:   Constitutional: trach to vent, lethargic  Eyes:  PERRLA  ENMT: nc/at, no thrush   Neck: supple   Respiratory: CTA B/L  Cardiovascular: RRR  Gastrointestinal: incision clean/dry/intact + Ostomy pink, wound vac, ALYSSA x2 SS fluid  Genitourinary: +scrotal edema, Castro in place  Extremities: SCD's in place and working bilaterally, + LE edema  Neurological: trach to vent , sedated   Skin: no rashes, ulcerations, lesions  Transplant Surgery - Multidisciplinary Rounds  --------------------------------------------------------------  OLT   11/15/2024        POD#42  Colectomy 12/7/2024   POD#20  IABP 12/7 removed 12/10  Tracheostomy 12/17/2024    Present:   Patient seen and examined with multidisciplinary Transplant team including Surgeon: Dr. James, Dr. Sorto, JAZZ Fisher/Ale, Hepatologist: Dr. Hidalgo and ICU team in AM rounds.   Disciplines not in attendance will be notified of the plan.     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     s/p OLT 11/15 with post op course c/b:  ·	Hypoxia: Reintubated 11/20, extubated 11/24->reintubated 11/24, extubated 11/26, reintubated 12/7, trached 12/17  ·	Ileus   ·	A fib  ·	AMS/Seizure   ·	L brachial DVT  ·	Neutropenia  ·	E coli Bacteremia (12/6)  ·	s/p Coloctomy 12/7.   ·	IABP 12/7, removed 12/10  ·	Ec Faecalis UTI (12/16)  ·	Stenotrophamonas in trach aspirate (12/19)  ·	UGIB 12/26    Interval/Overnight Events:   - afebrile, VSS except soft BPs remains on 3 pressors (minimal levo 0.02, vaso 0.03, kassandra 2.1)  - UGIB s/p EGD showing no focal bleeding rather generalized oozing c/w coagulopathy  - recieved 2uFFP, 2u cryo, 2u PLT  - demand ischemia, anemic received 3 uPRBC  - argatroban held due UGIB  - ileostomy with >1L dark bilious output    Immunosuppression:  - Cyclo HELD , MMF HELD, Solu 32  -ongoing monitoring for signs of rejection      MEDICATIONS  (STANDING):  albumin human  5% IVPB 250 milliLiter(s) IV Intermittent every 8 hours  buDESOnide    Inhalation Suspension 0.5 milliGRAM(s) Inhalation every 12 hours  calcium gluconate IVPB 2 Gram(s) IV Intermittent every 6 hours  caspofungin IVPB 50 milliGRAM(s) IV Intermittent every 24 hours  caspofungin IVPB      chlorhexidine 0.12% Liquid 15 milliLiter(s) Oral Mucosa every 12 hours  chlorhexidine 2% Cloths 1 Application(s) Topical <User Schedule>  CRRT Treatment    <Continuous>  insulin lispro (ADMELOG) corrective regimen sliding scale   SubCutaneous every 6 hours  meropenem  IVPB 1000 milliGRAM(s) IV Intermittent every 12 hours  methylPREDNISolone sodium succinate Injectable 32 milliGRAM(s) IV Push <User Schedule>  minocycline IVPB      minocycline IVPB 200 milliGRAM(s) IV Intermittent every 12 hours  mupirocin 2% Ointment 1 Application(s) Topical every 12 hours  norepinephrine Infusion 0.05 MICROgram(s)/kG/Min (9.33 mL/Hr) IV Continuous <Continuous>  nystatin Powder 1 Application(s) Topical every 12 hours  pantoprazole Infusion 8 mG/Hr (10 mL/Hr) IV Continuous <Continuous>  phenylephrine    Infusion 0.1 MICROgram(s)/kG/Min (1.87 mL/Hr) IV Continuous <Continuous>  Phoxillum Filtration BK 4 / 2.5 5000 milliLiter(s) (1250 mL/Hr) CRRT <Continuous>  Phoxillum Filtration BK 4 / 2.5 5000 milliLiter(s) (250 mL/Hr) CRRT <Continuous>  PrismaSATE Dialysate BGK 4 / 2.5 5000 milliLiter(s) (1500 mL/Hr) CRRT <Continuous>  trimethoprim / sulfamethoxazole IVPB 390 milliGRAM(s) IV Intermittent every 12 hours  vasopressin Infusion 0.03 Unit(s)/Min (4.5 mL/Hr) IV Continuous <Continuous>    MEDICATIONS  (PRN):  albuterol/ipratropium for Nebulization 3 milliLiter(s) Nebulizer every 6 hours PRN Shortness of Breath and/or Wheezing      PAST MEDICAL & SURGICAL HISTORY:  Diabetes      Transaminitis      Paroxysmal atrial fibrillation      Depression      BPH (benign prostatic hyperplasia)      Hypertension      Chronic atrial fibrillation      Coronary artery disease      Hepatocellular carcinoma      DM (diabetes mellitus)      HTN (hypertension)      Paroxysmal atrial fibrillation      Cirrhosis      HCC (hepatocellular carcinoma)      History of BPH      History of laparoscopic cholecystectomy      History of lumbar laminectomy      H/O prior ablation treatment      H/O percutaneous left heart catheterization          Vital Signs Last 24 Hrs  T(C): 37 (27 Dec 2024 15:00), Max: 37.1 (27 Dec 2024 11:00)  T(F): 98.6 (27 Dec 2024 15:00), Max: 98.8 (27 Dec 2024 11:00)  HR: 96 (27 Dec 2024 16:00) (89 - 101)  BP: --  BP(mean): --  RR: 20 (27 Dec 2024 16:00) (15 - 34)  SpO2: 98% (27 Dec 2024 16:00) (93% - 100%)    Parameters below as of 27 Dec 2024 15:00      O2 Concentration (%): 30    I&O's Summary    26 Dec 2024 07:01  -  27 Dec 2024 07:00  --------------------------------------------------------  IN: 8716.5 mL / OUT: 7441 mL / NET: 1275.5 mL    27 Dec 2024 07:01  -  27 Dec 2024 16:44  --------------------------------------------------------  IN: 1778.5 mL / OUT: 1396 mL / NET: 382.5 mL                              8.8    8.89  )-----------( 44       ( 27 Dec 2024 12:10 )             24.4     12-27    136  |  104  |  19  ----------------------------<  219[H]  3.9   |  21[L]  |  0.39[L]    Ca    7.6[L]      27 Dec 2024 12:10  Phos  2.0     12-27  Mg     2.3     12-27    TPro  4.2[L]  /  Alb  3.0[L]  /  TBili  13.8[H]  /  DBili  x   /  AST  613[H]  /  ALT  320[H]  /  AlkPhos  147[H]  12-27          Culture - Body Fluid with Gram Stain (collected 12-26-24 @ 19:36)  Source: Body Fluid  Gram Stain (12-27-24 @ 07:04):    polymorphonuclear leukocytes seen    No organisms seen    by cytocentrifuge    Culture - Body Fluid with Gram Stain (collected 12-26-24 @ 19:33)  Source: Body Fluid  Gram Stain (12-27-24 @ 07:00):    polymorphonuclear leukocytes seen    Gram positive cocci in pairs seen    by cytocentrifuge    Culture - Blood (collected 12-24-24 @ 03:30)  Source: .Blood BLOOD  Preliminary Report (12-27-24 @ 07:00):    No growth at 72 Hours    Culture - Bronchial (collected 12-23-24 @ 16:37)  Source: Combi-Cath  Gram Stain (12-23-24 @ 22:50):    No polymorphonuclear cells seen per low power field    No squamous epithelial cells per low power field    No organisms seen per oil power field  Final Report (12-25-24 @ 08:06):    Commensal charity consistent with body site    Culture - Blood (collected 12-23-24 @ 16:03)  Source: .Blood BLOOD  Preliminary Report (12-26-24 @ 20:01):    No growth at 72 Hours    Culture - Body Fluid with Gram Stain (collected 12-20-24 @ 18:23)  Source: Abdominal Fl  Gram Stain (12-21-24 @ 05:56):    polymorphonuclear leukocytes seen    Gram positive cocci in pairs seen    by cytocentrifuge  Final Report (12-25-24 @ 21:41):    Rare Enterococcus faecalis  Organism: Enterococcus faecalis (12-25-24 @ 21:41)  Organism: Enterococcus faecalis (12-25-24 @ 21:41)      ROS: Unable to assess patient is lethargic, s/p tracheostomy    PHYSICAL EXAM:   Constitutional: trach to vent, lethargic  Eyes:  PERRLA  ENMT: nc/at, no thrush   Neck: supple   Respiratory: CTA B/L  Cardiovascular: RRR  Gastrointestinal: incision clean/dry/intact + Ostomy pink, wound vac, ALYSSA x2 SS fluid  Genitourinary: +scrotal edema, Castro in place  Extremities: SCD's in place and working bilaterally, + LE edema  Neurological: trach to vent , sedated   Skin: no rashes, ulcerations, lesions

## 2024-12-27 NOTE — PROGRESS NOTE ADULT - ASSESSMENT
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   s/p OLT 11/15 with complicated post op course    [] Septic shock/Cardiogenic shock  [] s/p Colectomy 12/7   [] RTOR for closure and Ileostomy creation   [] IAPB 12/7- removed 12/10:   - E coli Bacteremia (12/6)  Received Tobra x 1 12/6 .   - EC faecalis asc fluid ( 12/20)  - Ec faecalis UTI (12/16)  - Tx ID following - Christopher/Caspo/Bactrim/Minocycline  - Amikacin x1 12/26  - Neutropenia: received Neupogen 480mcg x2  - MORAIMA: CRRT started 12/7, stopped 12/15, resumed CRRT 12/23  - AMS; CT Head neg  - Hold diuretics   - 12/23 repeat blood cultures ngtd  - 12/23 CT chest/Abd/pelvis: GGO, splenic infarcts, ileus  - S/P tracheotomy 12/17  - UGIB s/p EGD showing generalized oozing, coagulopathy       - given 2u FFP, 2u cryo, 2u PLT (12/26)    [] s/p OLT POD #41  - LFT's downtrending from 12/25, Liver US: tortuous common hepatic artery with elevated peak systolic velocities up to 635 cm/s, not noted on prior study. growing perihepatic fluid collection  - Diet: increase TFs as needed  - Pain management: avoid narcotics  - Strict I&Os, nichols, wound vac placed 12/10   - US: L brachial DVT   - wound vac exchange for ileostomy (12/13)  - HIT panel neg (12/14)    [ ] Immunosuppression  - Tacrolimus stopped 11/18 in setting of AMS/Seizure, Started Cyclo 12/17  - Cyclo by level, MMF HELD, Solumedrol 32 mg daily  - PPx: Gancyclovir (HELD) in setting of thrombocytopenia; repeat CMV PCR pending     [] lleus   -@ home on Linzess  - imaging with distended colon (likely opioid induced)  - s/p Neostigmine x 2  - s/p Relistor, last dose 12/1  - s/p colectomy with end ileostomy  (see above)  - ileostomy with >1L output, consider adding bulking agents  - Daily AXR     [] CMV viremia  - d/c gancylovir due to thrombocytopenia  - CMV PCR (12/11 and 12/16): neg, positive CMVPCR on 12/23 repeat CMV pending from 12/24    [ ] AMS  - Reintubated 11/20 Aspiration/hypoxia, extubated 11/24->[id number]/24--> extubated 11/26 -> [id number] -> tracheostomy 12/17  - EEG 11/30 negative, Neurology following  - BH following   - off tacro  - on keppra  -CT Head No Cont (12/20): Age-indeterminate posterior left frontal lobe infarct.   -CT Head (12/25): Evolving subacute infarct in the left supramarginal gyrus, recommend MRI.  - MRI Head when able    [ ] HTN/ pAFib  [ ] coronary vasospasm vs posterior wall MI  - EKG 12/24 c/f ST depression, rising troponins: 1800s  - d/c argatroban and aspirin due to bleeding  - Heparin gtt 12/19-21  - Cards- plan for PCI prior to DC   - Off Amiodarone, off dobutamine  - Off metoprolol for soft bp.  - Dr. 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   s/p OLT 11/15 with complicated post op course    [] Septic shock/Cardiogenic shock  [] s/p Colectomy 12/7   [] RTOR for closure and Ileostomy creation   [] IAPB 12/7- removed 12/10:   - E coli Bacteremia (12/6)  Received Tobra x 1 12/6 .   - EC faecalis asc fluid ( 12/20)  - Ec faecalis UTI (12/16)  - Tx ID following - Christopher/Caspo/Bactrim/Minocycline  - Amikacin x1 12/26  - Vanco x1 12/26  - Neutropenia: received Neupogen 480mcg x2  - MORAIMA: CRRT started 12/7, stopped 12/15, resumed CRRT 12/23  - AMS; CT Head neg  - Hold diuretics   - 12/23 repeat blood cultures ngtd  - 12/23 CT chest/Abd/pelvis: GGO, splenic infarcts, ileus  - S/P tracheotomy 12/17  - UGIB s/p EGD showing generalized oozing, coagulopathy       - given 2u FFP, 2u cryo, 2u PLT (12/26)       - recieved 2uFFP, 2u cryo, 2u PLT, 3 uPRBC 12/27       - Protonix ggt       - NPO    [] s/p OLT POD #41  - LFT's downtrending from 12/25, Liver US: tortuous common hepatic artery with elevated peak systolic velocities up to 635 cm/s, not noted on prior study. growing perihepatic fluid collection  - repeat liver DUS unchanged  - Diet: increase TFs as needed  - Pain management: avoid narcotics  - Strict I&Os, nichols, wound vac placed 12/10   - US: L brachial DVT   - wound vac exchange for ileostomy (12/13)  - HIT panel neg (12/14)      [ ] Immunosuppression  - Tacrolimus stopped 11/18 in setting of AMS/Seizure, Started Cyclo 12/17  - Cyclo HELD, MMF HELD, Solumedrol 32 mg daily  - PPx: Gancyclovir (HELD) in setting of thrombocytopenia; repeat CMV PCR pending     [] lleus   -@ home on Linzess  - imaging with distended colon (likely opioid induced)  - s/p Neostigmine x 2  - s/p Relistor, last dose 12/1  - s/p colectomy with end ileostomy  (see above)  - ileostomy with >1L output, consider adding bulking agents  - Daily AXR     [] CMV viremia  - d/c gancylovir due to thrombocytopenia  - CMV PCR (12/11 and 12/16): neg, positive CMVPCR on 12/23 repeat CMV pending from 12/24    [ ] AMS  - Reintubated 11/20 Aspiration/hypoxia, extubated 11/24->[id number]/24--> extubated 11/26 -> [id number]/7 -> tracheostomy 12/17  - EEG 11/30 negative, Neurology following  - BH following   - off tacro  - on keppra  -CT Head No Cont (12/20): Age-indeterminate posterior left frontal lobe infarct.   -CT Head (12/25): Evolving subacute infarct in the left supramarginal gyrus  - SICU stated MRI not needed     [ ] HTN/ pAFib  [ ] coronary vasospasm vs posterior wall MI  - EKG 12/24 c/f ST depression, rising troponins: 1800s  - d/c argatroban and aspirin due to bleeding  - Heparin gtt 12/19-21  - Cards- plan for PCI prior to DC   - Off Amiodarone, off dobutamine  - Off metoprolol for soft bp.  - Dr. 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   s/p OLT 11/15 with complicated post op course    [] Septic shock/Cardiogenic shock  [] s/p Colectomy 12/7   [] RTOR for closure and Ileostomy creation   [] IAPB 12/7- removed 12/10:   - E coli Bacteremia (12/6)  Received Tobra x 1 12/6 .   - EC faecalis asc fluid ( 12/20)  - Ec faecalis UTI (12/16)  - Tx ID following - Christopher/Caspo/Bactrim/Minocycline  - Amikacin x1 12/26  - Vanco x1 12/26  - Neutropenia: received Neupogen 480mcg x2  - MORAIMA: CRRT started 12/7, stopped 12/15, resumed CRRT 12/23  - AMS; CT Head neg  - Hold diuretics   - 12/23 repeat blood cultures ngtd  - 12/23 CT chest/Abd/pelvis: GGO, splenic infarcts, ileus  - S/P tracheotomy 12/17  - UGIB s/p EGD showing generalized oozing, coagulopathy       - given 2u FFP, 2u cryo, 2u PLT (12/26)       - recieved 2uFFP, 2u cryo, 2u PLT, 3 uPRBC 12/27       - Protonix ggt       - NPO    [] s/p OLT POD #41  - LFT's downtrending from 12/25, Liver US: tortuous common hepatic artery with elevated peak systolic velocities up to 635 cm/s, not noted on prior study. growing perihepatic fluid collection  - repeat liver DUS unchanged  - Diet: increase TFs as needed  - Pain management: avoid narcotics  - Strict I&Os, nichols, wound vac placed 12/10   - US: L brachial DVT   - wound vac exchange for ileostomy (12/13)  - HIT panel neg (12/14)      [ ] Immunosuppression  - Tacrolimus stopped 11/18 in setting of AMS/Seizure, Started Cyclo 12/17  - Cyclo HELD, MMF HELD, Solumedrol 32 mg daily  - PPx: Gancyclovir (HELD) in setting of thrombocytopenia; repeat CMV PCR pending     [] lleus   -@ home on Linzess  - imaging with distended colon (likely opioid induced)  - s/p Neostigmine x 2  - s/p Relistor, last dose 12/1  - s/p colectomy with end ileostomy  (see above)  - ileostomy with >1L output, consider adding bulking agents  - Daily AXR     [] CMV viremia  - d/c gancylovir due to thrombocytopenia  - CMV PCR (12/11 and 12/16): neg, positive CMVPCR on 12/23 repeat CMV pending from 12/24    [ ] AMS  - Reintubated 11/20 Aspiration/hypoxia, extubated 11/24->[id number]/24--> extubated 11/26 -> [id number]/7 -> tracheostomy 12/17  - EEG 11/30 negative, Neurology following  - BH following   - off tacro  - on keppra  -CT Head No Cont (12/20): Age-indeterminate posterior left frontal lobe infarct.   -CT Head (12/25): Evolving subacute infarct in the left supramarginal gyrus  - SICU stated MRI not needed     [ ] HTN/ pAFib  [ ] coronary vasospasm vs posterior wall MI  - EKG 12/24 c/f ST depression, rising troponins: 1800s  - d/c argatroban and aspirin due to bleeding  - Heparin gtt 12/19-21  - Cards- plan for PCI prior to DC   - Off Amiodarone, off dobutamine  - Off metoprolol for soft bp.  - Dr. Quintanilla following    [ ] DM  - ISS     []Scrotal abscess   - US (12/12): 2.1 x0.9 x 3.2 cm focal, right testicle- possible abscess (12/12)  - Urology recs: CT scrotum review no debridement required  - Urology recs Derm consult for guidance on wound care   - 12/23 US doppler scrotum: no arterial flow, limited venous flow, bl hydrocele  - 12/15 CT CAP no acute changes

## 2024-12-27 NOTE — PROGRESS NOTE ADULT - ASSESSMENT
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 Post op course complicated by acute hypoxic respiratory failure requiring intubation multiple times, most recently on 12/7 with conversion to tracheostomy on 12/17, e.coli bacteremia with RTOR on 12/7 for concern for worsening septic shock and cardiogenic shock s/p balloon pump placement and total abdominal colectomy in setting of ischemic bowel s/p OR on 12/9 for ileostomy creation, and olirugirc MORAIMA requiring CRRT and now intermittent HD.    Diagnosed with pneumonia secondary to Stenotrophomonas    Also with growth of Enterococcus faecalis from abdominal drains and urine culture    More fever and shock event on 12/23/24    UA (12/19) 2 WBC  BCx (12/23) NGTD  Bronch Cx (12/23) NGTD    CT Chest (12/23) Bibasilar groundglass and tree in bud opacities which may represent distal airway impaction versus pneumonia.    CT A/P (12/23) Peripheral wedge-shaped areas of hypoattenuation involving the superior aspect of the spleen, suggestive of splenic infarcts (12-59). Mildly dilated loops of proximal small bowel without transition point, likely ileus.    Testicular US (12/23) No appreciable arterial flow with very limited venous flow in in the testes bilaterally. Interval decrease in diffuse scrotal edema. Small bilateral hydroceles.    CT Pelvis (12/25) Moderate diffuse subcutaneous/scrotal edema without defined collection or subcutaneous air.    Shock state overnight in context of likely GIB    Antibiotic Course:  Meropenem ( 11/22-11/29, 11/22-11/29, 12/16-)   Minocycline (12/21-)  Bactrim (11/16-11/24, 12/8-12/11, 12/21-)  Fluconazole (11/16-12/2, 12/12-12/16)   Caspofungin ( 12/6-12/11, 12/17-)  Cefepime (12/10-12/16)   Metronidazole (12/10-12/14)   Ganciclovir (12/7-12/13)   Linezolid (11/23-11/26, 12/6-12/11)   Atovaquone (11/29-12/6)   Zosyn (11/20-11/22,12/6)   Tobramycin (12/6)   Valganciclovir (11/6-12/4)    #Leukocytosis, Fever, Shock, Transaminitis, Stenotrophomonas Pneumonia  --If further worsening shock overnight would repeat blood cultures and administer Tobramycin 600 mg IV x1 (7 mg/kg based on adjusted body weight).   --If no acute events tomorrow, continued improvement in secretions would stop Bactrim tomorrow (double Stenotrophomonas coverage)  --Continue Minocycline 200 mg IV Q12H  --Continue Meropenem 1g IV Q12H  --Doubt need for Caspofungin; no growth of fungal organisms    #Liver Transplant Recipient, Prophylactic Antibiotic  --Repeat CMV PCR on 12/30  --Once IV treatment dose Bactrim stopped would switch to PO Bactrim for PCP PPx    I will continue to follow. Please feel free to contact me with any further questions.    Kyle Junior M.D.  [facility] Division of Infectious Disease  8AM-5PM Monday - Friday: Available on [employer] Teams  After Hours and Holidays (or if no response on [employer] Teams): Please contact the Infectious Diseases Office at [phone]    The above assessment and plan were discussed with SICU Team

## 2024-12-27 NOTE — PROGRESS NOTE ADULT - SUBJECTIVE AND OBJECTIVE BOX
[facility] DIVISION OF KIDNEY DISEASES AND HYPERTENSION -- FOLLOW UP NOTE  --------------------------------------------------------------------------------  Chief Complaint:    24 hour events/subjective:  Patient was seen and examined at bedside  on CRRT      PAST HISTORY  --------------------------------------------------------------------------------  No significant changes to PMH, PSH, FHx, SHx, unless otherwise noted    ALLERGIES & MEDICATIONS  --------------------------------------------------------------------------------  Allergies    No Known Allergies    Intolerances      Standing Inpatient Medications  albumin human  5% IVPB 250 milliLiter(s) IV Intermittent every 8 hours  buDESOnide    Inhalation Suspension 0.5 milliGRAM(s) Inhalation every 12 hours  calcium gluconate IVPB 2 Gram(s) IV Intermittent every 6 hours  caspofungin IVPB      caspofungin IVPB 50 milliGRAM(s) IV Intermittent every 24 hours  chlorhexidine 0.12% Liquid 15 milliLiter(s) Oral Mucosa every 12 hours  chlorhexidine 2% Cloths 1 Application(s) Topical <User Schedule>  CRRT Treatment    <Continuous>  [id number]   Patch  12 MICROgram(s)/Hr. 1 Patch Transdermal every 48 hours  meropenem  IVPB 1000 milliGRAM(s) IV Intermittent every 12 hours  methylPREDNISolone sodium succinate Injectable 32 milliGRAM(s) IV Push <User Schedule>  minocycline IVPB      minocycline IVPB 200 milliGRAM(s) IV Intermittent every 12 hours  mupirocin 2% Ointment 1 Application(s) Topical every 12 hours  norepinephrine Infusion 0.05 MICROgram(s)/kG/Min IV Continuous <Continuous>  nystatin Powder 1 Application(s) Topical every 12 hours  pantoprazole Infusion 8 mG/Hr IV Continuous <Continuous>  phenylephrine    Infusion 0.1 MICROgram(s)/kG/Min IV Continuous <Continuous>  Phoxillum Filtration BK 4 / 2.5 5000 milliLiter(s) CRRT <Continuous>  Phoxillum Filtration BK 4 / 2.5 5000 milliLiter(s) CRRT <Continuous>  PrismaSATE Dialysate BGK 4 / 2.5 5000 milliLiter(s) CRRT <Continuous>  trimethoprim / sulfamethoxazole IVPB 390 milliGRAM(s) IV Intermittent every 12 hours  vasopressin Infusion 0.03 Unit(s)/Min IV Continuous <Continuous>    PRN Inpatient Medications  albuterol/ipratropium for Nebulization 3 milliLiter(s) Nebulizer every 6 hours PRN      REVIEW OF SYSTEMS- unable to obtain     --------------------------------------------------------------------------------      VITALS/PHYSICAL EXAM  --------------------------------------------------------------------------------  T(C): 36.8 (12-27-24 @ 07:00), Max: 37 (12-26-24 @ 23:00)  HR: 95 (12-27-24 @ 11:45) (89 - 101)  BP: --  RR: 18 (12-27-24 @ 11:45) (14 - 34)  SpO2: 100% (12-27-24 @ 11:45) (93% - 100%)  Wt(kg): --        12-26-24 @ 07:01  -  12-27-24 @ 07:00  --------------------------------------------------------  IN: 8716.5 mL / OUT: 7441 mL / NET: 1275.5 mL    12-27-24 @ 07:01  -  12-27-24 @ 12:06  --------------------------------------------------------  IN: 901.2 mL / OUT: 196 mL / NET: 705.2 mL      Physical Exam:  	Gen:  critically ill   	HEENT: PERRL, supple neck, clear oropharynx  	Pulm: CTA B/L  	CV: RRR, S1S2; no rub  	Abd: +BS, soft, nontender/nondistended                      Transplant: No tenderness, swelling  	: No suprapubic tenderness  	UE: Warm, FROM; no edema; no asterixis  	LE: Warm, FROM; no edema  	Skin: Warm, without rashes        LABS/STUDIES  --------------------------------------------------------------------------------              7.8    7.43  >-----------<  30       [12-27-24 @ 05:52]              22.3     134  |  103  |  20  ----------------------------<  244      [12-27-24 @ 05:52]  3.9   |  21  |  0.38        Ca     7.5     [12-27-24 @ 05:52]      Mg     2.3     [12-27-24 @ 05:52]      Phos  2.4     [12- 27-24 @ 05:52]    TPro  4.0  /  Alb  3.0  /  TBili  13.6  /  DBili  x   /  AST  712  /  ALT  348  /  AlkPhos  143  [12-27-24 @ 05:52]    PT/INR: PT 26.3 , INR 2.33       [12-27-24 @ 05:52]  PTT: 74.5       [12-27-24 @ 05:52]          [12-26-24 @ 12:33]    Creatinine Trend:  SCr 0.38 [12-27 @ 05:52]  SCr 0.40 [12-27 @ 00:22]  SCr 0.46 [12-26 @ 17:23]  SCr 0.52 [12-26 @ 12:33]  SCr 0.54 [12-26 @ 07:06]              Urinalysis - [12-27-24 @ 05:52]      Color  / Appearance  / SG  / pH       Gluc 244 / Ketone   / Bili  / Urobili        Blood  / Protein  / Leuk Est  / Nitrite       RBC  / WBC  / Hyaline  / Gran  / Sq Epi  / Non Sq Epi  / Bacteria       Vitamin D (25OH) <6.0      [11-21-24 @ 08:32]  TSH 0.68      [12-12-24 @ 12:27]  Lipid: chol 114, , HDL 47, LDL --      [04-24-24 @ 06:48]

## 2024-12-27 NOTE — PROGRESS NOTE ADULT - SUBJECTIVE AND OBJECTIVE BOX
Subjective  Denies fevers, chills, nausea, vomiting, SOB, CP.  Tolerating diet.    Objective    Vital signs  T(F): , Max: 98.6 (12-26-24 @ 23:00)  HR: 96 (12-27-24 @ 06:30)  BP: --  SpO2: 100% (12-27-24 @ 06:30)  Wt(kg): --    Output     OUT:    Bulb (mL): 530 mL    Bulb (mL): 975 mL    Indwelling Catheter - Urethral (mL): 20 mL    Nasogastric/Oral tube (mL): 630 mL    VAC (Vacuum Assisted Closure) System (mL): 0 mL  Total OUT: 2155 mL    Total NET: -2155 mL          : Gen: NAD  : Scrotum less edematous, less red, with persistent layer of brown layer of film over scrotal skin. Portions of the brown layer now turing black/necrotic almost like an eschar formation. Around the edges of the brown/necrotic film can see healthy erythematous tissue. No crepitus appreciated over scrotal skin.       Labs      12-27 @ 05:52    WBC 7.43  / Hct 22.3  / SCr 0.38     12-27 @ 00:22    WBC --    / Hct --    / SCr 0.40         Culture - Body Fluid with Gram Stain (collected 12-26-24 @ 19:36)  Source: Body Fluid  Gram Stain (12-27-24 @ 07:04):    polymorphonuclear leukocytes seen    No organisms seen    by cytocentrifuge    Culture - Body Fluid with Gram Stain (collected 12-26-24 @ 19:33)  Source: Body Fluid  Gram Stain (12-27-24 @ 07:00):    polymorphonuclear leukocytes seen    Gram positive cocci in pairs seen    by cytocentrifuge    Culture - Blood (collected 12-24-24 @ 03:30)  Source: .Blood BLOOD  Preliminary Report (12-27-24 @ 07:00):    No growth at 72 Hours    Culture - Bronchial (collected 12-23-24 @ 16:37)  Source: Combi-Cath  Gram Stain (12-23-24 @ 22:50):    No polymorphonuclear cells seen per low power field    No squamous epithelial cells per low power field    No organisms seen per oil power field  Final Report (12-25-24 @ 08:06):    Commensal charity consistent with body site    Culture - Blood (collected 12-23-24 @ 16:03)  Source: .Blood BLOOD  Preliminary Report (12-26-24 @ 20:01):    No growth at 72 Hours    Culture - Body Fluid with Gram Stain (collected 12-20-24 @ 18:23)  Source: Abdominal Fl  Gram Stain (12-21-24 @ 05:56):    polymorphonuclear leukocytes seen    Gram positive cocci in pairs seen    by cytocentrifuge  Final Report (12-25-24 @ 21:41):    Rare Enterococcus faecalis  Organism: Enterococcus faecalis (12-25-24 @ 21:41)  Organism: Enterococcus faecalis (12-25-24 @ 21:41)      Method Type: ESAU      -  Ampicillin: S <=2 Predicts results to ampicillin/sulbactam, amoxacillin-clavulanate and  piperacillin-tazobactam.      -  Vancomycin: S 1        Urine Cx: ?  Blood Cx: ?    Imaging

## 2024-12-27 NOTE — PROGRESS NOTE ADULT - SUBJECTIVE AND OBJECTIVE BOX
Follow Up:      Interval History:    REVIEW OF SYSTEMS  [  ] ROS unobtainable because:    [  ] All other systems negative except as noted below    Constitutional:  [ ] fever [ ] chills  [ ] weight loss  [ ] weakness  Skin:  [ ] rash [ ] phlebitis	  Eyes: [ ] icterus [ ] pain  [ ] discharge	  ENMT: [ ] sore throat  [ ] thrush [ ] ulcers [ ] exudates  Respiratory: [ ] dyspnea [ ] hemoptysis [ ] cough [ ] sputum	  Cardiovascular:  [ ] chest pain [ ] palpitations [ ] edema	  Gastrointestinal:  [ ] nausea [ ] vomiting [ ] diarrhea [ ] constipation [ ] pain	  Genitourinary:  [ ] dysuria [ ] frequency [ ] hematuria [ ] discharge [ ] flank pain  [ ] incontinence  Musculoskeletal:  [ ] myalgias [ ] arthralgias [ ] arthritis  [ ] back pain  Neurological:  [ ] headache [ ] seizures  [ ] confusion/altered mental status    Allergies  No Known Allergies        ANTIMICROBIALS:  caspofungin IVPB    caspofungin IVPB 50 every 24 hours  meropenem  IVPB 1000 every 12 hours  minocycline IVPB    minocycline IVPB 200 every 12 hours  trimethoprim / sulfamethoxazole IVPB 390 every 12 hours      OTHER MEDS:  MEDICATIONS  (STANDING):  albuterol/ipratropium for Nebulization 3 every 6 hours PRN  buDESOnide    Inhalation Suspension 0.5 every 12 hours  insulin lispro (ADMELOG) corrective regimen sliding scale  every 6 hours  methylPREDNISolone sodium succinate Injectable 32 <User Schedule>  norepinephrine Infusion 0.05 <Continuous>  pantoprazole Infusion 8 <Continuous>  phenylephrine    Infusion 0.1 <Continuous>  vasopressin Infusion 0.03 <Continuous>      Vital Signs Last 24 Hrs  T(C): 37 (27 Dec 2024 15:00), Max: 37.1 (27 Dec 2024 11:00)  T(F): 98.6 (27 Dec 2024 15:00), Max: 98.8 (27 Dec 2024 11:00)  HR: 97 (27 Dec 2024 17:30) (89 - 101)  BP: --  BP(mean): --  RR: 21 (27 Dec 2024 17:30) (15 - 34)  SpO2: 100% (27 Dec 2024 17:30) (93% - 100%)    Parameters below as of 27 Dec 2024 17:25  Patient On (Oxygen Delivery Method): ventilator        PHYSICAL EXAMINATION:  General: Alert and Awake, NAD  HEENT: PERRL, EOMI  Neck: Supple  Cardiac: RRR, No M/R/G  Resp: CTAB, No Wh/Rh/Ra  Abdomen: NBS, NT/ND, No HSM, No rigidity or guarding  MSK: No LE edema. No Calf tenderness  : No nichols  Skin: No rashes or lesions. Skin is warm and dry to the touch.   Neuro: Alert and Awake. CN 2-12 Grossly intact. Moves all four extremities spontaneously.  Psych: Calm, Pleasant, Cooperative                          8.8    8.89  )-----------( 44       ( 27 Dec 2024 12:10 )             24.4       12-27    136  |  104  |  19  ----------------------------<  219[H]  3.9   |  21[L]  |  0.39[L]    Ca    7.6[L]      27 Dec 2024 12:10  Phos  2.0     12-27  Mg     2.3     12-27    TPro  4.2[L]  /  Alb  3.0[L]  /  TBili  13.8[H]  /  DBili  x   /  AST  613[H]  /  ALT  320[H]  /  AlkPhos  147[H]  12-27      Urinalysis Basic - ( 27 Dec 2024 12:10 )    Color: x / Appearance: x / SG: x / pH: x  Gluc: 219 mg/dL / Ketone: x  / Bili: x / Urobili: x   Blood: x / Protein: x / Nitrite: x   Leuk Esterase: x / RBC: x / WBC x   Sq Epi: x / Non Sq Epi: x / Bacteria: x        MICROBIOLOGY:  Vancomycin Level, Random: 9.0 ug/mL (12-27-24 @ 05:52)  v  Body Fluid  12-26-24 --  --    polymorphonuclear leukocytes seen  No organisms seen  by cytocentrifuge      Body Fluid  12-26-24 --  --    polymorphonuclear leukocytes seen  Gram positive cocci in pairs seen  by cytocentrifuge      .Blood BLOOD  12-24-24   No growth at 72 Hours  --  --      Combi-Cath  12-23-24   Commensal charity consistent with body site  --    No polymorphonuclear cells seen per low power field  No squamous epithelial cells per low power field  No organisms seen per oil power field      .Blood BLOOD  12-23-24   No growth at 72 Hours  --  --      Abdominal Fl  12-20-24   Rare Enterococcus faecalis  --  Enterococcus faecalis      Trach Asp Tracheal Aspirate  12-19-24   Moderate Stenotrophomonas maltophilia  Commensal charity consistent with body site  --  Stenotrophomonas maltophilia      .Blood BLOOD  12-19-24   No growth at 5 days  --  --      Catheterized Catheterized  12-16-24   10,000 - 49,000 CFU/mL Enterococcus faecalis  --  Enterococcus faecalis      .Blood BLOOD  12-16-24   No growth at 5 days  --  --      Combi-Cath  12-16-24   Commensal charity consistent with body site  --    No polymorphonuclear leukocytes seen per low power field  No Squamous epithelial cells seen per low power field  No organisms seen per oil power field      .Blood BLOOD  12-16-24   No growth at 5 days  --  --      Combi-Cath  12-14-24   Commensal charity consistent with body site  --    Numerous polymorphonuclear leukocytes per low power field  No Squamous epithelial cells per low power field  No organisms seen per oil power field      .Other  12-12-24   No fungus isolated at 1 week.  --  --      .Blood BLOOD  12-07-24   No growth at 5 days  --  --      Body Fluid  12-07-24   No growth at 5 days  --    polymorphonuclear leukocytes seen  No organisms seen  by cytocentrifuge      .Blood BLOOD  12-06-24   Growth in aerobic and anaerobic bottles: Escherichia coli  See previous culture [id number]  --    Growth in aerobic bottle: Gram Negative Rods  Growth in anaerobic bottle: Gram Negative Rods      .Blood BLOOD  12-06-24   Growth in aerobic and anaerobic bottles: Escherichia coli  Direct identification is available within approximately 3-5  hours either by Blood Panel Multiplexed PCR or Direct  MALDI-TOF. Details: [email or web address]  --  Blood Culture PCR  Escherichia coli      .Blood BLOOD  12-05-24   No growth at 5 days  --  --      .Blood BLOOD  12-05-24   No growth at 5 days  --  --      .Stool  12-01-24   No enteric pathogens isolated.  (Stool culture examined for Salmonella,  Shigella, Campylobacter, Aeromonas, Plesiomonas,  Vibrio, E.coli O157 and Yersinia)  --  --        HIV-1 RNA Quantitative, Viral Load Log: NOT DET. lg /mL (12-18-24 @ 22:14)  Toxoplasma IgG Screen: 78.00 IU/mL (11-15-24 @ 00:41)  CMV IgG Antibody: 1.50 U/mL (11-15-24 @ 00:41)    CMVPCR Log: Det <1.54 Assay Dynamic Range: 34.5 to 1.0E+07 IU/mL (1.54 to 7.00 Log10 IU/mL)  Assay lower limit of quantification (LLOQ) is 34.5 IU/mL (1.54 Log10  IU/mL)  CMV DNA detected below the LLOQ will be reported as Detected < 34.5 IU/mL  (<1.54 Log10 IU/mL)  Nydia Cytomegalovirus (CMV) is an FDA-cleared quantitative test that  enables the detection and quantitation of CMV DNA in EDTA plasma of  infected transplant patients on the nydia 8800 system. This test was  verified by [employer]. Results should be interpreted  with consideration of all clinical findings and laboratory findings and  should not form the sole basis for a diagnosis or treatment decision. Xef69TR/mL (12-23 @ 06:15)        RADIOLOGY:    <The imaging below has been reviewed and visualized by me independently. Findings as detailed in report below> Follow Up:  shock    Interval History: afebrile. no acute events.     REVIEW OF SYSTEMS  [ x ] ROS unobtainable because:  trached  [  ] All other systems negative except as noted below    Constitutional:  [ ] fever [ ] chills  [ ] weight loss  [ ] weakness  Skin:  [ ] rash [ ] phlebitis	  Eyes: [ ] icterus [ ] pain  [ ] discharge	  ENMT: [ ] sore throat  [ ] thrush [ ] ulcers [ ] exudates  Respiratory: [ ] dyspnea [ ] hemoptysis [ ] cough [ ] sputum	  Cardiovascular:  [ ] chest pain [ ] palpitations [ ] edema	  Gastrointestinal:  [ ] nausea [ ] vomiting [ ] diarrhea [ ] constipation [ ] pain	  Genitourinary:  [ ] dysuria [ ] frequency [ ] hematuria [ ] discharge [ ] flank pain  [ ] incontinence  Musculoskeletal:  [ ] myalgias [ ] arthralgias [ ] arthritis  [ ] back pain  Neurological:  [ ] headache [ ] seizures  [ ] confusion/altered mental status    Allergies  No Known Allergies        ANTIMICROBIALS:  caspofungin IVPB    caspofungin IVPB 50 every 24 hours  meropenem  IVPB 1000 every 12 hours  minocycline IVPB    minocycline IVPB 200 every 12 hours  trimethoprim / sulfamethoxazole IVPB 390 every 12 hours      OTHER MEDS:  MEDICATIONS  (STANDING):  albuterol/ipratropium for Nebulization 3 every 6 hours PRN  buDESOnide    Inhalation Suspension 0.5 every 12 hours  insulin lispro (ADMELOG) corrective regimen sliding scale  every 6 hours  methylPREDNISolone sodium succinate Injectable 32 <User Schedule>  norepinephrine Infusion 0.05 <Continuous>  pantoprazole Infusion 8 <Continuous>  phenylephrine    Infusion 0.1 <Continuous>  vasopressin Infusion 0.03 <Continuous>      Vital Signs Last 24 Hrs  T(C): 37 (27 Dec 2024 15:00), Max: 37.1 (27 Dec 2024 11:00)  T(F): 98.6 (27 Dec 2024 15:00), Max: 98.8 (27 Dec 2024 11:00)  HR: 97 (27 Dec 2024 17:30) (89 - 101)  BP: --  BP(mean): --  RR: 21 (27 Dec 2024 17:30) (15 - 34)  SpO2: 100% (27 Dec 2024 17:30) (93% - 100%)    Parameters below as of 27 Dec 2024 17:25  Patient On (Oxygen Delivery Method): ventilator    General: Trached and Sedated  HEENT: +Trach  Neck: Supple  Cardiac: RRR, No M/R/G  Resp: CTAB, No Wh/Rh/Ra  Abdomen: abdominal drains with ascitic drainage. NBS, NT/ND, No HSM, No rigidity or guarding  MSK: No LE edema. No Calf tenderness  : +testicular edema with improved erythema compared to week prior  Skin: No rashes or lesions. Skin is warm and dry to the touch.   Neuro: Trached and Sedated  Psych: Unable to assess - trached and sedated                            8.8    8.89  )-----------( 44       ( 27 Dec 2024 12:10 )             24.4       12-27    136  |  104  |  19  ----------------------------<  219[H]  3.9   |  21[L]  |  0.39[L]    Ca    7.6[L]      27 Dec 2024 12:10  Phos  2.0     12-27  Mg     2.3     12-27    TPro  4.2[L]  /  Alb  3.0[L]  /  TBili  13.8[H]  /  DBili  x   /  AST  613[H]  /  ALT  320[H]  /  AlkPhos  147[H]  12-27      Urinalysis Basic - ( 27 Dec 2024 12:10 )    Color: x / Appearance: x / SG: x / pH: x  Gluc: 219 mg/dL / Ketone: x  / Bili: x / Urobili: x   Blood: x / Protein: x / Nitrite: x   Leuk Esterase: x / RBC: x / WBC x   Sq Epi: x / Non Sq Epi: x / Bacteria: x        MICROBIOLOGY:  Vancomycin Level, Random: 9.0 ug/mL (12-27-24 @ 05:52)  v  Body Fluid  12-26-24 --  --    polymorphonuclear leukocytes seen  No organisms seen  by cytocentrifuge      Body Fluid  12-26-24 --  --    polymorphonuclear leukocytes seen  Gram positive cocci in pairs seen  by cytocentrifuge      .Blood BLOOD  12-24-24   No growth at 72 Hours  --  --      Combi-Cath  12-23-24   Commensal charity consistent with body site  --    No polymorphonuclear cells seen per low power field  No squamous epithelial cells per low power field  No organisms seen per oil power field      .Blood BLOOD  12-23-24   No growth at 72 Hours  --  --      Abdominal Fl  12-20-24   Rare Enterococcus faecalis  --  Enterococcus faecalis      Trach Asp Tracheal Aspirate  12-19-24   Moderate Stenotrophomonas maltophilia  Commensal charity consistent with body site  --  Stenotrophomonas maltophilia      .Blood BLOOD  12-19-24   No growth at 5 days  --  --      Catheterized Catheterized  12-16-24   10,000 - 49,000 CFU/mL Enterococcus faecalis  --  Enterococcus faecalis      .Blood BLOOD  12-16-24   No growth at 5 days  --  --      Combi-Cath  12-16-24   Commensal charity consistent with body site  --    No polymorphonuclear leukocytes seen per low power field  No Squamous epithelial cells seen per low power field  No organisms seen per oil power field      .Blood BLOOD  12-16-24   No growth at 5 days  --  --      Combi-Cath  12-14-24   Commensal charity consistent with body site  --    Numerous polymorphonuclear leukocytes per low power field  No Squamous epithelial cells per low power field  No organisms seen per oil power field      .Other  12-12-24   No fungus isolated at 1 week.  --  --      .Blood BLOOD  12-07-24   No growth at 5 days  --  --      Body Fluid  12-07-24   No growth at 5 days  --    polymorphonuclear leukocytes seen  No organisms seen  by cytocentrifuge      .Blood BLOOD  12-06-24   Growth in aerobic and anaerobic bottles: Escherichia coli  See previous culture [id number]  --    Growth in aerobic bottle: Gram Negative Rods  Growth in anaerobic bottle: Gram Negative Rods      .Blood BLOOD  12-06-24   Growth in aerobic and anaerobic bottles: Escherichia coli  Direct identification is available within approximately 3-5  hours either by Blood Panel Multiplexed PCR or Direct  MALDI-TOF. Details: [email or web address]  --  Blood Culture PCR  Escherichia coli      .Blood BLOOD  12-05-24   No growth at 5 days  --  --      .Blood BLOOD  12-05-24   No growth at 5 days  --  --      .Stool  12-01-24   No enteric pathogens isolated.  (Stool culture examined for Salmonella,  Shigella, Campylobacter, Aeromonas, Plesiomonas,  Vibrio, E.coli O157 and Yersinia)  --  --        HIV-1 RNA Quantitative, Viral Load Log: NOT DET. lg /mL (12-18-24 @ 22:14)  Toxoplasma IgG Screen: 78.00 IU/mL (11-15-24 @ 00:41)  CMV IgG Antibody: 1.50 U/mL (11-15-24 @ 00:41)    CMVPCR Log: Det <1.54 Assay Dynamic Range: 34.5 to 1.0E+07 IU/mL (1.54 to 7.00 Log10 IU/mL)  Assay lower limit of quantification (LLOQ) is 34.5 IU/mL (1.54 Log10  IU/mL)  CMV DNA detected below the LLOQ will be reported as Detected < 34.5 IU/mL  (<1.54 Log10 IU/mL)  Nydia Cytomegalovirus (CMV) is an FDA-cleared quantitative test that  enables the detection and quantitation of CMV DNA in EDTA plasma of  infected transplant patients on the nydia 8800 system. This test was  verified by [employer]. Results should be interpreted  with consideration of all clinical findings and laboratory findings and  should not form the sole basis for a diagnosis or treatment decision. Cbc65GQ/mL (12-23 @ 06:15)        RADIOLOGY:    <The imaging below has been reviewed and visualized by me independently. Findings as detailed in report below>    < from: US Trans Liver W/ Doppler (12.27.24 @ 11:40) >  IMPRESSION:    No evidence of portal vein gas on the current study.    A torturous common hepatic artery is again seen with peak systolic   velocity of 361 cm/s, decreased from prior exam measuring 635 cm/s.   Recommend close interval follow-up.    Perihepatic collection at the dayana hepatis measures up to 5.4 cm, likely   not significantly changed. Additional complex collection, that appears   similar to prior exam.    Perihepatic ascites.    < end of copied text >

## 2024-12-27 NOTE — PROGRESS NOTE ADULT - ASSESSMENT
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 Post-op course c/b worsening mental status and acute hypoxic respiratory failure requiring multiple intubations/extubations, no longer intubated, now s/p tracheostomy (as of 12/23/2024) and cardiogenic shock s/p Percutaneous insertion of an intra-aortic balloon pump and septic shock/colonic ischemia s/p total abdominal colectomy with ileostomy on 12/7/24, s/p ex-lap w/ileostomy on 12/9/24. Urology initially consulted 12/9-12/16 for scrotal edema w/ large skin breakdown. Scrotum at that time was larger, more red edematous, swollen with skin breakdown, no crepitus felt. Scrotal/testicular ultrasounds were performed at that time, all showing scrotal edema, no necrotizing infection/abscess. Urology signed off, recommending scrotal support, wound care, no surgical intervention indicated at that time.     Urology was recalled today 12/23 for worsening of scrotal skin sloughing. Per primary team, edema and scrotal swelling has improved. However, there is a layer of brown film overlaying the ventral area of the scrotum encompassing both the ventral layer of skin of the left and right scrotum. The brown film sloughs and bleed superficially when irritated, it is cold to the touch. There is an area of similar film overlaying a section of the right inner thigh. There is clear delineation from the brown layer of film and normal skin-colored scrotum. The normal colored scrotal skin is warm to touch.  No appreciable flow to testicles shown on ultrasound likely also 2/2 to prolonged pressor requirements decreasing blood flow to testicles and should resolve with improved peripheral perfusion. On examination testicles in normal lie and are soft, no role for orchiopexy for testicles.    Recs  - No appreciable flow to testicles shown on ultrasound likely also 2/2 to prolonged pressor requirements decreasing blood flow to testicles and should resolve with improved peripheral perfusion. On examination testicles in normal lie and are soft, no role for orchiopexy for testicles.  - No urologic surgical intervention indicated at this time -> Exam today consistent with exam over the past few days. Underyling tissue is still health and think this is just expected progression of dead overlying skin necrosing. Exam and and imaging do not suggest progressing necrotizing infection and do not believe this to be the source of sepsis. Recent liver US shows air in portal vein so underlying abdominal pathology may be driving sepsis.  - Reconsult derm for recommendations on topical wound care  - Wet to dry dressing to aid in debridement of tissue   - Continue with antibiotics   - Scrotal support  - Continue with wound care  - Urology to follow     Discussed with Dr. Guerrero     [facility]  [address] [place] [address], [address]  [place] [address] [phone]

## 2024-12-27 NOTE — PROGRESS NOTE ADULT - SUBJECTIVE AND OBJECTIVE BOX
INTERVAL EVENTS:  - repeat liver DUS  - started protonix gtt  - dced AC/ASA  - goal HgB >8, received 1u prbc  - Erythromycin 500mg x1  - GI for EGD 12/26: diffuse oozing  - Albumin 500cc q8  - 1g vanc given  - desmopressin 30 x 1  - switched D10 to D5    SUBJECTIVE/ROS:  [ ] A ten-point review of systems was otherwise negative except as noted.  [ ] Due to altered mental status/intubation, subjective information were not able to be obtained from the patient. History was obtained, to the extent possible, from review of the chart and collateral sources of information.      NEURO  Exam:  waxes and wanes  Meds: fentaNYL   Patch  12 MICROgram(s)/Hr. 1 Patch Transdermal every 48 hours  [x] Adequacy of sedation and pain control has been assessed and adjusted      RESPIRATORY  RR: 57 (12-27-24 @ 00:00) (14 - 58)  SpO2: 94% (12-27-24 @ 00:00) (94% - 100%)  Exam: unlabored, clear to auscultation bilaterally  Mechanical Ventilation: Mode: AC/ CMV (Assist Control/ Continuous Mandatory Ventilation), RR (machine): 14, RR (patient): 18, TV (machine): 480, FiO2: 30, PEEP: 5, ITime: 1, MAP: 6.4, PIP: 17  ABG - ( 26 Dec 2024 17:01 )  pH: 7.40  /  pCO2: 35    /  pO2: 94    / HCO3: 22    / Base Excess: -2.7  /  SaO2: 98.8      [N/A] Extubation Readiness Assessed  Meds: albuterol/ipratropium for Nebulization 3 milliLiter(s) Nebulizer every 6 hours PRN Shortness of Breath and/or Wheezing  buDESOnide    Inhalation Suspension 0.5 milliGRAM(s) Inhalation every 12 hours      CARDIOVASCULAR  HR: 96 (12-27-24 @ 00:00) (89 - 104)  BP: 107/53 (12-26-24 @ 07:00) (107/53 - 107/53)  BP(mean): 76 (12-26-24 @ 07:00) (76 - 76)  ABP: 113/44 (12-27-24 @ 00:00) (105/36 - 143/50)  ABP(mean): 70 (12-27-24 @ 00:00) (53 - 88)  VBG - ( 25 Dec 2024 12:15 )  pH: 7.33  /  pCO2: 37    /  pO2: 29    / HCO3: 20    / Base Excess: -5.9  /  SaO2: 40.3   Lactate: 5.8      Exam: regular rate and rhythm  Cardiac Rhythm: sinus  Perfusion     [x]Adequate   [ ]Inadequate  Mentation   [x]Normal       [ ]Reduced  Extremities  [x]Warm         [ ]Cool  Volume Status [ ]Hypervolemic [x]Euvolemic [ ]Hypovolemic  Meds: norepinephrine Infusion 0.05 MICROgram(s)/kG/Min IV Continuous <Continuous>  phenylephrine    Infusion 0.1 MICROgram(s)/kG/Min IV Continuous <Continuous>      GI/NUTRITION  Exam: soft, nontender, nondistended  Diet: npo  Meds: pantoprazole Infusion 8 mG/Hr IV Continuous <Continuous>      GENITOURINARY  I&O's Detail    12-25 @ 07:01  -  12-26 @ 07:00  --------------------------------------------------------  IN:    Albumin 25%  -  50 mL: 200 mL    Albumin 5%  - 250 mL: 1000 mL    Argatroban: 1.2 mL    Argatroban: 2.2 mL    Argatroban: 2.1 mL    Cryoprecipitate: 75 mL    dextrose 20%: 80 mL    Enteral Tube Flush: 180 mL    IV PiggyBack: 100 mL    IV PiggyBack: 850 mL    multiple electrolytes Injection Type 1 Bolus: 500 mL    Nepro with Carb Steady: 770 mL    Norepinephrine: 168.2 mL    Norepinephrine: 103 mL    Phenylephrine: 404.4 mL    Plasma: 600 mL    Platelets - Single Donor: 225 mL    PRBCs (Packed Red Blood Cells): 1200 mL    Vasopressin: 108 mL  Total IN: 6569.1 mL    OUT:    Bulb (mL): 155 mL    Bulb (mL): 65 mL    Emesis (mL): 400 mL    Ileostomy (mL): 450 mL    Indwelling Catheter - Urethral (mL): 10 mL    Nasogastric/Oral tube (mL): 1600 mL    Other (mL): 2277 mL    VAC (Vacuum Assisted Closure) System (mL): 0 mL  Total OUT: 4957 mL    Total NET: 1612.1 mL      12-26 @ 07:01 - 12-27 @ 00:31  --------------------------------------------------------  IN:    Albumin 5%  - 500 mL: 1000 mL    Cryoprecipitate: 150 mL    dextrose 10% + sodium chloride 0.9%: 600 mL    dextrose 20%: 40 mL    dextrose 5% + sodium chloride 0.9%: 100 mL    Enteral Tube Flush: 40 mL    IV PiggyBack: 1099.8 mL    IV PiggyBack: 250 mL    IV PiggyBack: 750 mL    Norepinephrine: 5.6 mL    Norepinephrine: 50.1 mL    Pantoprazole: 140 mL    Phenylephrine: 628.2 mL    Plasma: 300 mL    Platelets - Single Donor: 450 mL    PRBCs (Packed Red Blood Cells): 300 mL    Propofol: 20.9 mL    Vasopressin: 76.5 mL  Total IN: 6001.1 mL    OUT:    Bulb (mL): 925 mL    Bulb (mL): 370 mL    Ileostomy (mL): 1500 mL    Indwelling Catheter - Urethral (mL): 20 mL    Nasogastric/Oral tube (mL): 600 mL    Other (mL): 1922 mL    VAC (Vacuum Assisted Closure) System (mL): 0 mL  Total OUT: 5337 mL  Total NET: 664.1 mL      12-26    137  |  105  |  21  ----------------------------<  215[H]  3.9   |  20[L]  |  0.46[L]    Ca    7.7[L]      26 Dec 2024 17:23  Phos  2.9     12-26  Mg     2.5     12-26    TPro  4.0[L]  /  Alb  3.2[L]  /  TBili  12.4[H]  /  DBili  x   /  AST  1434[H]  /  ALT  487[H]  /  AlkPhos  164[H]  12-26    [ ] Castro catheter, indication: N/A  Meds: albumin human  5% IVPB 500 milliLiter(s) IV Intermittent every 8 hours  dextrose 5% + sodium chloride 0.9%. 1000 milliLiter(s) IV Continuous <Continuous>      HEMATOLOGIC  Meds:   [x] VTE Prophylaxis                        8.9    11.72 )-----------( 32       ( 26 Dec 2024 17:22 )             25.2     PT/INR - ( 26 Dec 2024 17:24 )   PT: 28.1 sec;   INR: 2.49 ratio         PTT - ( 26 Dec 2024 17:24 )  PTT:66.0 sec      INFECTIOUS DISEASES  WBC Count: 11.72 K/uL (12-26 @ 17:22)  WBC Count: 9.86 K/uL (12-26 @ 12:33)  WBC Count: 13.63 K/uL (12-26 @ 07:06)  WBC Count: 12.13 K/uL (12-26 @ 04:08)  WBC Count: 14.29 K/uL (12-26 @ 01:18)    RECENT CULTURES:  Specimen Source: .Blood BLOOD  Date/Time: 12-24 @ 03:30  Culture Results:   No growth at 48 Hours  Gram Stain: --  Organism: --  Specimen Source: Combi-Cath  Date/Time: 12-23 @ 16:37  Culture Results:   Commensal charity consistent with body site  Gram Stain:   No polymorphonuclear cells seen per low power field  No squamous epithelial cells per low power field  No organisms seen per oil power field  Organism: --  Specimen Source: .Blood BLOOD  Date/Time: 12-23 @ 16:03  Culture Results:   No growth at 72 Hours  Gram Stain: --  Organism: --    Meds: caspofungin IVPB 50 milliGRAM(s) IV Intermittent every 24 hours  caspofungin IVPB      meropenem  IVPB 1000 milliGRAM(s) IV Intermittent every 12 hours  minocycline IVPB      minocycline IVPB 200 milliGRAM(s) IV Intermittent every 12 hours  trimethoprim / sulfamethoxazole IVPB 390 milliGRAM(s) IV Intermittent every 12 hours      ENDOCRINE  CAPILLARY BLOOD GLUCOSE  Meds: methylPREDNISolone sodium succinate Injectable 32 milliGRAM(s) IV Push <User Schedule>  vasopressin Infusion 0.03 Unit(s)/Min IV Continuous <Continuous>      CODE STATUS: full code

## 2024-12-27 NOTE — ADVANCED PRACTICE NURSE CONSULT - ASSESSMENT
In at bedside and PT wound care Sharon Morgan for routine vac dressing  and complete ostomy pouching system changes. (see PT wound note for details). Complete pouching system changed.  On exam:  Stoma 1 3/4" appears, congested with grayish adherent slough noted. Stoma is maroon red in middle, under loosened slough and viable. Some bleeding noted at mucocutaneous junction controlled by gentle pressure. No active bleeding noted during visit. Peristomal skin and mucocutaneous junction intact.   Noted creases/skin indents at 3 and 9 o'clock.   Dusted the peristomal skin with stoma powder excess removed.  Dab in with Cavilon 3M no sting barrier liquid film.  Re pouched w/ 2 1/4" flat skin barrier.  Bead of stoma paste applied to skin creases to level the surface and at the back of skin barrier near opening to caulk.   Re attached the skin barrier flange with the Bangs drainable pouch.  Patient  tolerated procedure well. Supplies and pattern  left at the bedside.  Will continue to follow up.

## 2024-12-27 NOTE — PROGRESS NOTE ADULT - ASSESSMENT
74 male w/ a PMHx of HTN, DM, AF, BPH, MASH cirrhosis and HCC s/p OLT on 11/15. Case was uneventful but post-op course has been prolonged and c/b delirium, aspiration, multiple episodes of acute hypoxic respiratory failure requiring reintubation from 11/20-11/24 & 11/24-11/26, AF w/ RVR, ileus requiring NGT, distended colon requiring rectal tube, dysphagia, malnutrition, hypernatremia, fevers, pancytopenia, poorly controlled glucoses, and left brachial VTE. Patient went into severe refractory septic shock on 12/6 w/ blood cultures positive for E. coli s/p s/p ex lap, total abd colectomy, end ileostomy, 3 intentionally retained laparotomy pads for bleeding, Abthera, IABP placement w/ admission to CTICU, Patient required inotropes, Jacqui, and CRRT post-op. s/p closure 12/9. IABP removed 12/10 and transferred back to SICU 12/13. SICU course c/b narrow complex arrythmia w/ ECG showing new ST elevations concerning for posterior wall MI vs vasospasms, cards consulted and started on heparin gtt for empiric ACS tx which was c/b GIB then transitioned to argatroban c/b bleed. TTE revealed LVOT obstruction & TODD.       PLAN:  Neuro:  - Opens eyes intermittently, Not following commands, off sedation  - CT head 11/19, 11/21, 11/25, 11/29, 12/5 negative  - EEG: Mild diffuse cerebral dysfunction that is not specific in etiology. No epileptic discharges recorded. No seizures recorded.  - Holding home xanax, Abilify, Zoloft, Remeron, Lexapro  - CT head 12/20 showing age-indeterminate infarct, f/u Neurology recs  - No need for MRI     Resp:  - S/p bedside tracheostomy 12/17  - PRVC 14/480/5/30  - SBT daily  - c/w inhaled bronchodilator    CV:  - Pressors: Levo, vaso, Aldo  - s/p dobutamine and amiodarone gtt   - home metoprolol 12.5 q8hr held for pressor req  - IABP removed 12/10  - TTE 12/6 w/ LVOT obstruction & TODD  - TTE 12/11 shows EF of 55-60%    GI:  - trend LFTs  - NPO, NGT  (can be  to gravity tube feeds or to gravity, no suction)   - protonix gtt  - monitor ALYSSA output    /Renal:  - CRRT restarted 12/23  - Monitor BUN/Cr, I&Os, trend UOP  - Scrotal US 12/15 -> edema, no abscess -> elevate  - repeat scrotal doppler for concern of ischemia > low flow state, low concern for abscess  - nichols in place  - Albumin 5% 500 cc q8hr  - D5NS at 50/hr    Heme:  - trend H/H, PLTs, coags  - argatroban gtt held i/s/o bleed  - 12/26: 1 prbc, 1 plt, 1 cryo  - s/p desmopression 30 x 1    ID:  - ABX: jeniffer, caspo, bactrim, minocycline  - 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

## 2024-12-28 NOTE — PROGRESS NOTE ADULT - NS ATTEND AMEND GEN_ALL_CORE FT
74M w/ PMHx of HTN, DM, AF, BPH, MASH cirrhosis and 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 Patient's post-op course has been c/b multiple reintubation, required total colectomy w/ ileostomy, and IABP placement 2/2 cardiogenic shock with subsequent removal.   S/P prolonged intubation, s/p trached and PEG    Continues to be awake and alert, follows, off Fent patch to prn Fent pushes  Tolerated 6 hrs of PS 5/10 yesterday, will try TC as tolerated  Overall improving vasopressor requirement, off Levo, titrate  Aldo and physiologic dose Vaso  Increase tube feed at goal, LFT and lactate decreased  PPI bid for UGI bleed  Standing colloid resuscitation for high ileostomy output.  Abx Christopher, Minocycline, Bactrim, Caspo, resp culture Stenotrophomonas, Vanco dose based on level. BC GPR likely contaminant.  CT of scrotum unremarkable, no intervention as per , off Castro  CRRT with net even, colloid and blood transfusion not counted    ISS/off Lantus  Mech DVT ppx, transfusion to keep Hb > 8, products based on TEG    To start low dose cyclosporine today    Family kept updated  Bed side PT    Plan coordinated with transplant team

## 2024-12-28 NOTE — PROGRESS NOTE ADULT - ASSESSMENT
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 Post-op course c/b worsening mental status and acute hypoxic respiratory failure requiring multiple intubations/extubations, no longer intubated, now s/p tracheostomy (as of 12/23/2024) and cardiogenic shock s/p Percutaneous insertion of an intra-aortic balloon pump and septic shock/colonic ischemia s/p total abdominal colectomy with ileostomy on 12/7/24, s/p ex-lap w/ileostomy on 12/9/24. Urology initially consulted 12/9-12/16 for scrotal edema w/ large skin breakdown. Scrotum at that time was larger, more red edematous, swollen with skin breakdown, no crepitus felt. Scrotal/testicular ultrasounds were performed at that time, all showing scrotal edema, no necrotizing infection/abscess. Urology signed off, recommending scrotal support, wound care, no surgical intervention indicated at that time.     Urology was recalled today 12/23 for worsening of scrotal skin sloughing. Per primary team, edema and scrotal swelling has improved. However, there is a layer of brown film overlaying the ventral area of the scrotum encompassing both the ventral layer of skin of the left and right scrotum. The brown film sloughs and bleed superficially when irritated, it is cold to the touch. There is an area of similar film overlaying a section of the right inner thigh. There is clear delineation from the brown layer of film and normal skin-colored scrotum. The normal colored scrotal skin is warm to touch.  No appreciable flow to testicles shown on ultrasound likely also 2/2 to prolonged pressor requirements decreasing blood flow to testicles and should resolve with improved peripheral perfusion. On examination testicles in normal lie and are soft, no role for orchiopexy for testicles.    12/28/24  Scrotum with no evidence of gangrene, non-indurated, non erythematous. Dark olive appearance suggestive of ischemia possible d/t dependent position and pressors.    Recs  - No appreciable flow to testicles shown on ultrasound likely also 2/2 to prolonged pressor requirements decreasing blood flow to testicles and should resolve with improved peripheral perfusion. On examination testicles in normal lie and are soft, no role for orchiopexy for testicles.  - No urologic surgical intervention indicated at this time -> Exam today consistent with exam over the past few days. Underyling tissue is still health and think this is just expected progression of dead overlying skin necrosing. Exam and and imaging do not suggest progressing necrotizing infection and do not believe this to be the source of sepsis. Recent liver US shows air in portal vein so underlying abdominal pathology may be driving sepsis.  - Reconsult derm for recommendations on topical wound care  - Continue with antibiotics   - Scrotal support  - Continue with wound care  - Urology to follow         [facility]  [address] [place] [address], [address]  [place] [address] [phone]

## 2024-12-28 NOTE — PROGRESS NOTE ADULT - SUBJECTIVE AND OBJECTIVE BOX
Follow Up:      Interval History:    REVIEW OF SYSTEMS  [  ] ROS unobtainable because:    [  ] All other systems negative except as noted below    Constitutional:  [ ] fever [ ] chills  [ ] weight loss  [ ] weakness  Skin:  [ ] rash [ ] phlebitis	  Eyes: [ ] icterus [ ] pain  [ ] discharge	  ENMT: [ ] sore throat  [ ] thrush [ ] ulcers [ ] exudates  Respiratory: [ ] dyspnea [ ] hemoptysis [ ] cough [ ] sputum	  Cardiovascular:  [ ] chest pain [ ] palpitations [ ] edema	  Gastrointestinal:  [ ] nausea [ ] vomiting [ ] diarrhea [ ] constipation [ ] pain	  Genitourinary:  [ ] dysuria [ ] frequency [ ] hematuria [ ] discharge [ ] flank pain  [ ] incontinence  Musculoskeletal:  [ ] myalgias [ ] arthralgias [ ] arthritis  [ ] back pain  Neurological:  [ ] headache [ ] seizures  [ ] confusion/altered mental status    Allergies  No Known Allergies        ANTIMICROBIALS:  caspofungin IVPB    caspofungin IVPB 50 every 24 hours  linezolid  IVPB    meropenem  IVPB 1000 every 12 hours  minocycline IVPB    minocycline IVPB 200 every 12 hours      OTHER MEDS:  MEDICATIONS  (STANDING):  albuterol/ipratropium for Nebulization 3 every 6 hours PRN  buDESOnide    Inhalation Suspension 0.5 every 12 hours  fentaNYL    Injectable 25 every 6 hours PRN  insulin lispro (ADMELOG) corrective regimen sliding scale  every 6 hours  methylPREDNISolone sodium succinate Injectable 32 <User Schedule>  norepinephrine Infusion 0.05 <Continuous>  oxyCODONE    Solution 5 every 4 hours PRN  oxyCODONE    Solution 2.5 every 4 hours PRN  pantoprazole Infusion 8 <Continuous>  phenylephrine    Infusion 1.5 <Continuous>  vasopressin Infusion 0.03 <Continuous>      Vital Signs Last 24 Hrs  T(C): 36.8 (28 Dec 2024 15:00), Max: 37.1 (28 Dec 2024 07:00)  T(F): 98.2 (28 Dec 2024 15:00), Max: 98.8 (28 Dec 2024 07:00)  HR: 88 (28 Dec 2024 16:00) (84 - 118)  BP: --  BP(mean): --  RR: 19 (28 Dec 2024 16:00) (14 - 85)  SpO2: 98% (28 Dec 2024 16:00) (93% - 100%)    Parameters below as of 28 Dec 2024 15:00  Patient On (Oxygen Delivery Method): tracheostomy collar    O2 Concentration (%): 40    PHYSICAL EXAMINATION:  General: Alert and Awake, NAD  HEENT: PERRL, EOMI  Neck: Supple  Cardiac: RRR, No M/R/G  Resp: CTAB, No Wh/Rh/Ra  Abdomen: NBS, NT/ND, No HSM, No rigidity or guarding  MSK: No LE edema. No Calf tenderness  : No nichols  Skin: No rashes or lesions. Skin is warm and dry to the touch.   Neuro: Alert and Awake. CN 2-12 Grossly intact. Moves all four extremities spontaneously.  Psych: Calm, Pleasant, Cooperative                          7.7    8.70  )-----------( 22       ( 28 Dec 2024 12:29 )             22.0       12-28    136  |  103  |  19  ----------------------------<  196[H]  3.9   |  21[L]  |  0.37[L]    Ca    7.5[L]      28 Dec 2024 12:29  Phos  2.6     12-28  Mg     2.3     12-28    TPro  4.1[L]  /  Alb  3.0[L]  /  TBili  13.8[H]  /  DBili  x   /  AST  335[H]  /  ALT  232[H]  /  AlkPhos  142[H]  12-28      Urinalysis Basic - ( 28 Dec 2024 12:29 )    Color: x / Appearance: x / SG: x / pH: x  Gluc: 196 mg/dL / Ketone: x  / Bili: x / Urobili: x   Blood: x / Protein: x / Nitrite: x   Leuk Esterase: x / RBC: x / WBC x   Sq Epi: x / Non Sq Epi: x / Bacteria: x        MICROBIOLOGY:  Vancomycin Level, Random: <4.0 ug/mL (12-28-24 @ 06:19)  v  Body Fluid  12-26-24   No growth  --    polymorphonuclear leukocytes seen  No organisms seen  by cytocentrifuge      Body Fluid  12-26-24   Rare Enterococcus faecalis  --    polymorphonuclear leukocytes seen  Gram positive cocci in pairs seen  by cytocentrifuge      .Blood BLOOD  12-24-24   No growth at 4 days  --  --      Combi-Cath  12-23-24   Commensal charity consistent with body site  --    No polymorphonuclear cells seen per low power field  No squamous epithelial cells per low power field  No organisms seen per oil power field      .Blood BLOOD  12-23-24   Growth in anaerobic bottle: Gram Positive Rods  Direct identification is available within approximately 3-5  hours either by Blood Panel Multiplexed PCR or Direct  MALDI-TOF. Details: [email or web address]  --  Blood Culture PCR      Abdominal Fl  12-20-24   Rare Enterococcus faecalis  --  Enterococcus faecalis      Trach Asp Tracheal Aspirate  12-19-24   Moderate Stenotrophomonas maltophilia  Commensal charity consistent with body site  --  Stenotrophomonas maltophilia      .Blood BLOOD  12-19-24   No growth at 5 days  --  --      Catheterized Catheterized  12-16-24   10,000 - 49,000 CFU/mL Enterococcus faecalis  --  Enterococcus faecalis      .Blood BLOOD  12-16-24   No growth at 5 days  --  --      Combi-Cath  12-16-24   Commensal charity consistent with body site  --    No polymorphonuclear leukocytes seen per low power field  No Squamous epithelial cells seen per low power field  No organisms seen per oil power field      .Blood BLOOD  12-16-24   No growth at 5 days  --  --      Combi-Cath  12-14-24   Commensal charity consistent with body site  --    Numerous polymorphonuclear leukocytes per low power field  No Squamous epithelial cells per low power field  No organisms seen per oil power field      .Other  12-12-24   No fungus isolated at 1 week.  --  --      .Blood BLOOD  12-07-24   No growth at 5 days  --  --      Body Fluid  12-07-24   No growth at 5 days  --    polymorphonuclear leukocytes seen  No organisms seen  by cytocentrifuge      .Blood BLOOD  12-06-24   Growth in aerobic and anaerobic bottles: Escherichia coli  See previous culture [id number]  --    Growth in aerobic bottle: Gram Negative Rods  Growth in anaerobic bottle: Gram Negative Rods      .Blood BLOOD  12-06-24   Growth in aerobic and anaerobic bottles: Escherichia coli  Direct identification is available within approximately 3-5  hours either by Blood Panel Multiplexed PCR or Direct  MALDI-TOF. Details: [email or web address]  --  Blood Culture PCR  Escherichia coli      .Blood BLOOD  12-05-24   No growth at 5 days  --  --      .Blood BLOOD  12-05-24   No growth at 5 days  --  --      .Stool  12-01-24   No enteric pathogens isolated.  (Stool culture examined for Salmonella,  Shigella, Campylobacter, Aeromonas, Plesiomonas,  Vibrio, E.coli O157 and Yersinia)  --  --        HIV-1 RNA Quantitative, Viral Load Log: NOT DET. lg /mL (12-18-24 @ 22:14)  Toxoplasma IgG Screen: 78.00 IU/mL (11-15-24 @ 00:41)  CMV IgG Antibody: 1.50 U/mL (11-15-24 @ 00:41)    CMVPCR Log: Det <1.54 Assay Dynamic Range: 34.5 to 1.0E+07 IU/mL (1.54 to 7.00 Log10 IU/mL)  Assay lower limit of quantification (LLOQ) is 34.5 IU/mL (1.54 Log10  IU/mL)  CMV DNA detected below the LLOQ will be reported as Detected < 34.5 IU/mL  (<1.54 Log10 IU/mL)  Nydia Cytomegalovirus (CMV) is an FDA-cleared quantitative test that  enables the detection and quantitation of CMV DNA in EDTA plasma of  infected transplant patients on the nydia 8800 system. This test was  verified by [facility]. Results should be interpreted  with consideration of all clinical findings and laboratory findings and  should not form the sole basis for a diagnosis or treatment decision. Xre22OK/mL (12-23 @ 06:15)        RADIOLOGY:    <The imaging below has been reviewed and visualized by me independently. Findings as detailed in report below> Follow Up:  shock    Interval History: blood culture from 12/23 resulted with GPR. afebrile.     REVIEW OF SYSTEMS  [ x ] ROS unobtainable because:  trached  [  ] All other systems negative except as noted below    Constitutional:  [ ] fever [ ] chills  [ ] weight loss  [ ] weakness  Skin:  [ ] rash [ ] phlebitis	  Eyes: [ ] icterus [ ] pain  [ ] discharge	  ENMT: [ ] sore throat  [ ] thrush [ ] ulcers [ ] exudates  Respiratory: [ ] dyspnea [ ] hemoptysis [ ] cough [ ] sputum	  Cardiovascular:  [ ] chest pain [ ] palpitations [ ] edema	  Gastrointestinal:  [ ] nausea [ ] vomiting [ ] diarrhea [ ] constipation [ ] pain	  Genitourinary:  [ ] dysuria [ ] frequency [ ] hematuria [ ] discharge [ ] flank pain  [ ] incontinence  Musculoskeletal:  [ ] myalgias [ ] arthralgias [ ] arthritis  [ ] back pain  Neurological:  [ ] headache [ ] seizures  [ ] confusion/altered mental status      Allergies  No Known Allergies        ANTIMICROBIALS:  caspofungin IVPB    caspofungin IVPB 50 every 24 hours  linezolid  IVPB    meropenem  IVPB 1000 every 12 hours  minocycline IVPB    minocycline IVPB 200 every 12 hours      OTHER MEDS:  MEDICATIONS  (STANDING):  albuterol/ipratropium for Nebulization 3 every 6 hours PRN  buDESOnide    Inhalation Suspension 0.5 every 12 hours  fentaNYL    Injectable 25 every 6 hours PRN  insulin lispro (ADMELOG) corrective regimen sliding scale  every 6 hours  methylPREDNISolone sodium succinate Injectable 32 <User Schedule>  norepinephrine Infusion 0.05 <Continuous>  oxyCODONE    Solution 5 every 4 hours PRN  oxyCODONE    Solution 2.5 every 4 hours PRN  pantoprazole Infusion 8 <Continuous>  phenylephrine    Infusion 1.5 <Continuous>  vasopressin Infusion 0.03 <Continuous>      Vital Signs Last 24 Hrs  T(C): 36.8 (28 Dec 2024 15:00), Max: 37.1 (28 Dec 2024 07:00)  T(F): 98.2 (28 Dec 2024 15:00), Max: 98.8 (28 Dec 2024 07:00)  HR: 88 (28 Dec 2024 16:00) (84 - 118)  BP: --  BP(mean): --  RR: 19 (28 Dec 2024 16:00) (14 - 85)  SpO2: 98% (28 Dec 2024 16:00) (93% - 100%)    Parameters below as of 28 Dec 2024 15:00  Patient On (Oxygen Delivery Method): tracheostomy collar    O2 Concentration (%): 40    PHYSICAL EXAMINATION:  General: Trached and Sedated  HEENT: +Trach  Neck: Supple  Cardiac: RRR, No M/R/G  Resp: CTAB, No Wh/Rh/Ra  Abdomen: abdominal drains with ascitic drainage. NBS, NT/ND, No HSM, No rigidity or guarding  MSK: No LE edema. No Calf tenderness  : +testicular edema with improved erythema compared to week prior  Skin: No rashes or lesions. Skin is warm and dry to the touch.   Neuro: Trached and Sedated  Psych: Unable to assess - trached and sedated                          7.7    8.70  )-----------( 22       ( 28 Dec 2024 12:29 )             22.0       12-28    136  |  103  |  19  ----------------------------<  196[H]  3.9   |  21[L]  |  0.37[L]    Ca    7.5[L]      28 Dec 2024 12:29  Phos  2.6     12-28  Mg     2.3     12-28    TPro  4.1[L]  /  Alb  3.0[L]  /  TBili  13.8[H]  /  DBili  x   /  AST  335[H]  /  ALT  232[H]  /  AlkPhos  142[H]  12-28      Urinalysis Basic - ( 28 Dec 2024 12:29 )    Color: x / Appearance: x / SG: x / pH: x  Gluc: 196 mg/dL / Ketone: x  / Bili: x / Urobili: x   Blood: x / Protein: x / Nitrite: x   Leuk Esterase: x / RBC: x / WBC x   Sq Epi: x / Non Sq Epi: x / Bacteria: x        MICROBIOLOGY:  Vancomycin Level, Random: <4.0 ug/mL (12-28-24 @ 06:19)  v  Body Fluid  12-26-24   No growth  --    polymorphonuclear leukocytes seen  No organisms seen  by cytocentrifuge      Body Fluid  12-26-24   Rare Enterococcus faecalis  --    polymorphonuclear leukocytes seen  Gram positive cocci in pairs seen  by cytocentrifuge      .Blood BLOOD  12-24-24   No growth at 4 days  --  --      Combi-Cath  12-23-24   Commensal charity consistent with body site  --    No polymorphonuclear cells seen per low power field  No squamous epithelial cells per low power field  No organisms seen per oil power field      .Blood BLOOD  12-23-24   Growth in anaerobic bottle: Gram Positive Rods  Direct identification is available within approximately 3-5  hours either by Blood Panel Multiplexed PCR or Direct  MALDI-TOF. Details: [email or web address]  --  Blood Culture PCR      Abdominal Fl  12-20-24   Rare Enterococcus faecalis  --  Enterococcus faecalis      Trach Asp Tracheal Aspirate  12-19-24   Moderate Stenotrophomonas maltophilia  Commensal charity consistent with body site  --  Stenotrophomonas maltophilia      .Blood BLOOD  12-19-24   No growth at 5 days  --  --      Catheterized Catheterized  12-16-24   10,000 - 49,000 CFU/mL Enterococcus faecalis  --  Enterococcus faecalis      .Blood BLOOD  12-16-24   No growth at 5 days  --  --      Combi-Cath  12-16-24   Commensal charity consistent with body site  --    No polymorphonuclear leukocytes seen per low power field  No Squamous epithelial cells seen per low power field  No organisms seen per oil power field      .Blood BLOOD  12-16-24   No growth at 5 days  --  --      Combi-Cath  12-14-24   Commensal charity consistent with body site  --    Numerous polymorphonuclear leukocytes per low power field  No Squamous epithelial cells per low power field  No organisms seen per oil power field      .Other  12-12-24   No fungus isolated at 1 week.  --  --      .Blood BLOOD  12-07-24   No growth at 5 days  --  --      Body Fluid  12-07-24   No growth at 5 days  --    polymorphonuclear leukocytes seen  No organisms seen  by cytocentrifuge      .Blood BLOOD  12-06-24   Growth in aerobic and anaerobic bottles: Escherichia coli  See previous culture [id number]  --    Growth in aerobic bottle: Gram Negative Rods  Growth in anaerobic bottle: Gram Negative Rods      .Blood BLOOD  12-06-24   Growth in aerobic and anaerobic bottles: Escherichia coli  Direct identification is available within approximately 3-5  hours either by Blood Panel Multiplexed PCR or Direct  MALDI-TOF. Details: [email or web address]  --  Blood Culture PCR  Escherichia coli      .Blood BLOOD  12-05-24   No growth at 5 days  --  --      .Blood BLOOD  12-05-24   No growth at 5 days  --  --      .Stool  12-01-24   No enteric pathogens isolated.  (Stool culture examined for Salmonella,  Shigella, Campylobacter, Aeromonas, Plesiomonas,  Vibrio, E.coli O157 and Yersinia)  --  --        HIV-1 RNA Quantitative, Viral Load Log: NOT DET. lg /mL (12-18-24 @ 22:14)  Toxoplasma IgG Screen: 78.00 IU/mL (11-15-24 @ 00:41)  CMV IgG Antibody: 1.50 U/mL (11-15-24 @ 00:41)    CMVPCR Log: Det <1.54 Assay Dynamic Range: 34.5 to 1.0E+07 IU/mL (1.54 to 7.00 Log10 IU/mL)  Assay lower limit of quantification (LLOQ) is 34.5 IU/mL (1.54 Log10  IU/mL)  CMV DNA detected below the LLOQ will be reported as Detected < 34.5 IU/mL  (<1.54 Log10 IU/mL)  Nydia Cytomegalovirus (CMV) is an FDA-cleared quantitative test that  enables the detection and quantitation of CMV DNA in EDTA plasma of  infected transplant patients on the nydia 8800 system. This test was  verified by [facility]. Results should be interpreted  with consideration of all clinical findings and laboratory findings and  should not form the sole basis for a diagnosis or treatment decision. Lsl42EV/mL (12-23 @ 06:15)        RADIOLOGY:    <The imaging below has been reviewed and visualized by me independently. Findings as detailed in report below>    < from: Xray Chest 1 View- PORTABLE-Routine (Xray Chest 1 View- PORTABLE-Routine in AM.) (12.28.24 @ 07:39) >  Bilateral central lines are seen in the SVC region. The nasogastric tube   courses below the left hemidiaphragm, tip off edge of film.  HEART: normal in size.  LUNGS: There is a small infiltrate left lower lobe, similar..  BONES: cervical fusion hardware    < end of copied text >

## 2024-12-28 NOTE — PROGRESS NOTE ADULT - SUBJECTIVE AND OBJECTIVE BOX
[facility] DIVISION OF KIDNEY DISEASES AND HYPERTENSION -- FOLLOW UP NOTE  --------------------------------------------------------------------------------  Chief Complaint:    24 hour events/subjective:  Patient was seen and examined at bedside        PAST HISTORY  --------------------------------------------------------------------------------  No significant changes to PMH, PSH, FHx, SHx, unless otherwise noted    ALLERGIES & MEDICATIONS  --------------------------------------------------------------------------------  Allergies    No Known Allergies    Intolerances      Standing Inpatient Medications  bacitracin   Ointment 1 Application(s) Topical once  buDESOnide    Inhalation Suspension 0.5 milliGRAM(s) Inhalation every 12 hours  caspofungin IVPB 50 milliGRAM(s) IV Intermittent every 24 hours  caspofungin IVPB      chlorhexidine 0.12% Liquid 15 milliLiter(s) Oral Mucosa every 12 hours  chlorhexidine 2% Cloths 1 Application(s) Topical <User Schedule>  CRRT Treatment    <Continuous>  insulin lispro (ADMELOG) corrective regimen sliding scale   SubCutaneous every 6 hours  meropenem  IVPB 1000 milliGRAM(s) IV Intermittent every 12 hours  methylPREDNISolone sodium succinate Injectable 32 milliGRAM(s) IV Push <User Schedule>  minocycline IVPB      minocycline IVPB 200 milliGRAM(s) IV Intermittent every 12 hours  mupirocin 2% Ointment 1 Application(s) Topical every 12 hours  norepinephrine Infusion 0.05 MICROgram(s)/kG/Min IV Continuous <Continuous>  nystatin Powder 1 Application(s) Topical every 12 hours  pantoprazole Infusion 8 mG/Hr IV Continuous <Continuous>  phenylephrine    Infusion 1.5 MICROgram(s)/kG/Min IV Continuous <Continuous>  Phoxillum Filtration BK 4 / 2.5 5000 milliLiter(s) CRRT <Continuous>  Phoxillum Filtration BK 4 / 2.5 5000 milliLiter(s) CRRT <Continuous>  PrismaSATE Dialysate BGK 4 / 2.5 5000 milliLiter(s) CRRT <Continuous>  trimethoprim / sulfamethoxazole IVPB 390 milliGRAM(s) IV Intermittent every 12 hours  vasopressin Infusion 0.03 Unit(s)/Min IV Continuous <Continuous>    PRN Inpatient Medications  albuterol/ipratropium for Nebulization 3 milliLiter(s) Nebulizer every 6 hours PRN  fentaNYL    Injectable 25 MICROGram(s) IV Push every 6 hours PRN  FIRST- Mouthwash  BLM 10 milliLiter(s) Swish and Spit every 8 hours PRN  oxyCODONE    Solution 5 milliGRAM(s) Oral every 4 hours PRN  oxyCODONE    Solution 2.5 milliGRAM(s) Oral every 4 hours PRN      REVIEW OF SYSTEMS- unable to obtain as patient     VITALS/PHYSICAL EXAM  --------------------------------------------------------------------------------  T(C): 37.1 (12-28-24 @ 07:00), Max: 37.1 (12-27-24 @ 11:00)  HR: 100 (12-28-24 @ 09:30) (88 - 118)  BP: --  RR: 19 (12-28-24 @ 09:30) (17 - 85)  SpO2: 100% (12-28-24 @ 09:30) (98% - 100%)  Wt(kg): --        12-27-24 @ 07:01  -  12-28-24 @ 07:00  --------------------------------------------------------  IN: 3950.2 mL / OUT: 4594 mL / NET: -643.8 mL    12-28-24 @ 07:01  -  12-28-24 @ 10:58  --------------------------------------------------------  IN: 105 mL / OUT: 0 mL / NET: 105 mL      Physical Exam:  	Gen:  critically ill   	HEENT: PERRL, supple neck, clear oropharynx  	Pulm: CTA B/L  	CV: RRR, S1S2; no rub  	Abd: +BS, soft, nontender/nondistended                      Transplant: No tenderness, swelling  	: No suprapubic tenderness  	UE: Warm, FROM; no edema; no asterixis  	LE: Warm, FROM; no edema  	Skin: Warm, without rashes    	      LABS/STUDIES  --------------------------------------------------------------------------------              8.1    8.16  >-----------<  25       [12-28-24 @ 06:19]              23.0     137  |  103  |  19  ----------------------------<  216      [12-28-24 @ 06:19]  4.2   |  20  |  0.38        Ca     8.0     [12-28-24 @ 06:19]      Mg     2.3     [12-28-24 @ 06:19]      Phos  3.7     [12-28-24 @ 06:19]    TPro  4.1  /  Alb  3.1  /  TBili  13.6  /  DBili  x   /  AST  383  /  ALT  249  /  AlkPhos  141  [12-28-24 @ 06:19]    PT/INR: PT 17.8 , INR 1.55       [12-28-24 @ 06:19]  PTT: 46.8       [12-28-24 @ 06:19]          [12-26-24 @ 12:33]    Creatinine Trend:  SCr 0.38 [12-28 @ 06:19]  SCr 0.40 [12-28 @ 00:13]  SCr 0.42 [12-27 @ 18:04]  SCr 0.39 [12-27 @ 12:10]  SCr 0.38 [12-27 @ 05:52]            Urinalysis - [12-28-24 @ 06:19]      Color  / Appearance  / SG  / pH       Gluc 216 / Ketone   / Bili  / Urobili        Blood  / Protein  / Leuk Est  / Nitrite       RBC  / WBC  / Hyaline  / Gran  / Sq Epi  / Non Sq Epi  / Bacteria       Vitamin D (25OH) <6.0      [11-21-24 @ 08:32]  TSH 0.68      [12-12-24 @ 12:27]  Lipid: chol 114, , HDL 47, LDL --      [04-24-24 @ 06:48]

## 2024-12-28 NOTE — PROGRESS NOTE ADULT - ASSESSMENT
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   s/p OLT 11/15 with complicated post op course    [] Septic shock/Cardiogenic shock  [] s/p Colectomy 12/7   [] RTOR for closure and Ileostomy creation   [] IAPB 12/7- removed 12/10:   - E coli Bacteremia (12/6)  Received Tobra x 1 12/6 .   - EC faecalis asc fluid ( 12/20)  - Ec faecalis UTI (12/16)  - Tx ID following - Christopher/Caspo/Bactrim/Minocycline  - Amikacin x1 12/26  - Vanco x1 12/26  - Neutropenia: received Neupogen 480mcg x2  - MORAIMA: CRRT started 12/7, stopped 12/15, resumed CRRT 12/23  - AMS; CT Head neg  - Hold diuretics   - 12/23 repeat blood cultures ngtd  - 12/23 CT chest/Abd/pelvis: GGO, splenic infarcts, ileus  - S/P tracheotomy 12/17  - UGIB s/p EGD showing generalized oozing, coagulopathy       - given 2u FFP, 2u cryo, 2u PLT (12/26)       - recieved 2uFFP, 2u cryo, 2u PLT, 3 uPRBC 12/27       - Protonix ggt       - NPO    [] s/p OLT POD #42  - LFT's downtrending from 12/25, Liver US: tortuous common hepatic artery with elevated peak systolic velocities up to 635 cm/s, not noted on prior study. growing perihepatic fluid collection  - repeat liver DUS unchanged  - Diet: increase TFs as needed  - Pain management: avoid narcotics  - Strict I&Os, nichols, wound vac placed 12/10   - US: L brachial DVT   - wound vac exchange for ileostomy (12/13)  - HIT panel neg (12/14)      [ ] Immunosuppression  - Tacrolimus stopped 11/18 in setting of AMS/Seizure, Started Cyclo 12/17  - Cyclo HELD, MMF HELD, Solumedrol 32 mg daily  - PPx: Gancyclovir (HELD) in setting of thrombocytopenia; repeat CMV PCR pending     [] lleus   -@ home on Linzess  - imaging with distended colon (likely opioid induced)  - s/p Neostigmine x 2  - s/p Relistor, last dose 12/1  - s/p colectomy with end ileostomy  (see above)  - ileostomy with >1L output, consider adding bulking agents  - Daily AXR     [] CMV viremia  - d/c gancylovir due to thrombocytopenia  - CMV PCR (12/11 and 12/16): neg, positive CMVPCR on 12/23 repeat CMV pending from 12/24    [ ] AMS  - Reintubated 11/20 Aspiration/hypoxia, extubated 11/24->[id number]/24--> extubated 11/26 -> [id number] -> tracheostomy 12/17  - EEG 11/30 negative, Neurology following  - BH following   - off tacro  - on keppra  -CT Head No Cont (12/20): Age-indeterminate posterior left frontal lobe infarct.   -CT Head (12/25): Evolving subacute infarct in the left supramarginal gyrus  - SICU stated MRI not needed     [ ] HTN/ pAFib  [ ] coronary vasospasm vs posterior wall MI  - EKG 12/24 c/f ST depression, rising troponins: 1800s  - d/c argatroban and aspirin due to bleeding  - Heparin gtt 12/19-21  - Cards- plan for PCI prior to DC   - Off Amiodarone, off dobutamine  - Off metoprolol for soft bp.  - Dr. 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   s/p OLT 11/15 with complicated post op course    [] Septic shock/Cardiogenic shock  [] s/p Colectomy 12/7   [] RTOR for closure and Ileostomy creation   [] IAPB 12/7- removed 12/10  - E coli Bacteremia (12/6)  Received Tobra x 1 12/6 .   - EC faecalis asc fluid ( 12/20)  - Ec faecalis UTI (12/16)  - Tx ID following - Christopher/Caspo/Bactrim/Minocycline  - Amikacin x1 12/26  - Vanco x1 12/26  - Neutropenia: received Neupogen 480mcg x2  - MORAIMA: CRRT started 12/7, stopped 12/15, resumed CRRT 12/23  - AMS; CT Head neg  - Hold diuretics   - 12/23 repeat blood cultures ngtd  - 12/23 CT chest/Abd/pelvis: GGO, splenic infarcts, ileus  - S/P tracheotomy 12/17  - UGIB s/p EGD showing generalized oozing, coagulopathy       - given 2u FFP, 2u cryo, 2u PLT (12/26)       - recieved 2uFFP, 2u cryo, 2u PLT, 3 uPRBC 12/27       - Protonix ggt       - NPO    [] s/p OLT POD #42  - LFT's downtrending from 12/25, Liver US: tortuous common hepatic artery with elevated peak systolic velocities up to 635 cm/s, not noted on prior study. growing perihepatic fluid collection  - repeat liver DUS unchanged  - Diet: increase TFs as needed  - Pain management: avoid narcotics  - Strict I&Os, nichols, wound vac placed 12/10   - US: L brachial DVT   - wound vac exchange for ileostomy (12/13)  - HIT panel neg (12/14)      [ ] Immunosuppression  - Tacrolimus stopped 11/18 in setting of AMS/Seizure, Started Cyclo 12/17  - Cyclo HELD, MMF HELD, Solumedrol 32 mg daily  - PPx: Gancyclovir (HELD) in setting of thrombocytopenia; repeat CMV PCR pending     [] lleus   -@ home on Linzess  - imaging with distended colon (likely opioid induced)  - s/p Neostigmine x 2  - s/p Relistor, last dose 12/1  - s/p colectomy with end ileostomy  (see above)  - ileostomy with >1L output, consider adding bulking agents  - Daily AXR     [] CMV viremia  - d/c gancylovir due to thrombocytopenia  - CMV PCR (12/11 and 12/16): neg, positive CMVPCR on 12/23 repeat CMV pending from 12/24    [ ] AMS  - Reintubated 11/20 Aspiration/hypoxia, extubated 11/24->[id number]/24--> extubated 11/26 -> [id number]/7 -> tracheostomy 12/17  - EEG 11/30 negative, Neurology following  - BH following   - off tacro  - on keppra  -CT Head No Cont (12/20): Age-indeterminate posterior left frontal lobe infarct.   -CT Head (12/25): Evolving subacute infarct in the left supramarginal gyrus  - SICU stated MRI not needed     [ ] HTN/ pAFib  [ ] coronary vasospasm vs posterior wall MI  - EKG 12/24 c/f ST depression, rising troponins: 1800s  - d/c argatroban and aspirin due to bleeding  - Heparin gtt 12/19-21  - Cards- plan for PCI prior to DC   - Off Amiodarone, off dobutamine  - Off metoprolol for soft bp.  - Dr. 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   s/p OLT 11/15 with complicated post op course    [] Septic shock/Cardiogenic shock  [] s/p Colectomy 12/7   [] RTOR for closure and Ileostomy creation   [] IAPB 12/7- removed 12/10  - E coli Bacteremia (12/6)  Received Tobra x 1 12/6 .   - EC faecalis asc fluid ( 12/20)  - Ec faecalis UTI (12/16)  - Tx ID following - Christopher/Caspo/Bactrim/Minocycline  - Amikacin x1 12/26  - Vanco x1 12/26  - Neutropenia: received Neupogen 480mcg x2  - MORAIMA: CRRT started 12/7, stopped 12/15, resumed CRRT 12/23  - AMS; CT Head neg  - Hold diuretics   - 12/23 repeat blood cultures ngtd  - repeat cultures pending  - 12/23 CT chest/Abd/pelvis: GGO, splenic infarcts, ileus  - S/P tracheotomy 12/17  - UGIB s/p EGD showing generalized oozing, coagulopathy       - given 2u FFP, 2u cryo, 2u PLT (12/26)       - recieved 2uFFP, 2u cryo, 2u PLT, 3 uPRBC 12/27       - Protonix ggt       - NPO    [] s/p OLT POD #42  - LFT's downtrending from 12/25, Liver US: tortuous common hepatic artery with elevated peak systolic velocities up to 635 cm/s, not noted on prior study. growing perihepatic fluid collection  - repeat liver DUS unchanged  - Diet: increase TFs as needed  - Pain management: avoid narcotics  - Strict I&Os, nichols, wound vac placed 12/10   - US: L brachial DVT   - wound vac exchange for ileostomy (12/13)  - HIT panel neg (12/14)      [ ] Immunosuppression  - Tacrolimus stopped 11/18 in setting of AMS/Seizure, Started Cyclo 12/17  - Cyclo restarted 25mg QD, MMF HELD, Solumedrol 32 mg daily  - PPx: Gancyclovir (HELD) in setting of thrombocytopenia; repeat CMV PCR pending     [] lleus   -@ home on Linzess  - imaging with distended colon (likely opioid induced)  - s/p Neostigmine x 2  - s/p Relistor, last dose 12/1  - s/p colectomy with end ileostomy  (see above)  - ileostomy with >1L output, consider adding bulking agents  - Daily AXR     [] CMV viremia  - d/c gancylovir due to thrombocytopenia  - CMV PCR (12/11 and 12/16): neg, positive CMVPCR on 12/23 repeat CMV pending from 12/24    [ ] AMS  - Reintubated 11/20 Aspiration/hypoxia, extubated 11/24->[id number]/24--> extubated 11/26 -> [id number]/7 -> tracheostomy 12/17  - EEG 11/30 negative, Neurology following  - BH following   - off tacro  - on keppra  -CT Head No Cont (12/20): Age-indeterminate posterior left frontal lobe infarct.   -CT Head (12/25): Evolving subacute infarct in the left supramarginal gyrus  - SICU stated MRI not needed     [ ] HTN/ pAFib  [ ] coronary vasospasm vs posterior wall MI  - EKG 12/24 c/f ST depression, rising troponins: 1800s  - d/c argatroban and aspirin due to bleeding  - Heparin gtt 12/19-21  - Cards- plan for PCI prior to DC   - Off Amiodarone, off dobutamine  - Off metoprolol for soft bp.  - Dr. 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   s/p OLT 11/15 with complicated post op course    [] Septic shock/Cardiogenic shock  [] s/p Colectomy 12/7   [] RTOR for closure and Ileostomy creation   [] IAPB 12/7- removed 12/10  - E coli Bacteremia (12/6)  Received Tobra x 1 12/6 .   - EC faecalis asc fluid ( 12/20) (12/26)  - Ec faecalis UTI (12/16)  - Tx ID following - Christopher/Caspo/Bactrim/Minocycline/added Linezolid  - Amikacin x1 12/26  - Vanco x1 12/26, 12/28  - Neutropenia: received Neupogen 480mcg x2  - MORAIMA: CRRT started 12/7, stopped 12/15, resumed CRRT 12/23  - AMS; CT Head neg  - Hold diuretics   - 12/23 BCX: GPR  - repeat cultures today  - 12/23 CT chest/Abd/pelvis: GGO, splenic infarcts, ileus  - S/P tracheotomy 12/17  - UGIB s/p EGD showing generalized oozing, coagulopathy       - given 2u FFP, 2u cryo, 2u PLT (12/26)       - recieved 2uFFP, 2u cryo, 2u PLT, 3 uPRBC 12/27       - Protonix ggt       - TF at goal    [] s/p OLT POD #43  - LFT's downtrending from 12/25, Liver US: tortuous common hepatic artery with elevated peak systolic velocities up to 635 cm/s, not noted on prior study. growing perihepatic fluid collection  - repeat liver DUS unchanged  - Diet: increase TFs as needed  - Pain management: avoid narcotics  - Strict I&Os, nichols, wound vac placed 12/10   - US: L brachial DVT   - wound vac exchange for ileostomy (12/13)  - HIT panel neg (12/14)      [ ] Immunosuppression  - Tacrolimus stopped 11/18 in setting of AMS/Seizure, Started Cyclo 12/17  - Cyclo restarted 25mg QD, MMF HELD, Solumedrol 32 mg daily  - PPx: Gancyclovir (HELD) in setting of thrombocytopenia; repeat CMV PCR pending     [] lleus   -@ home on Linzess  - imaging with distended colon (likely opioid induced)  - s/p Neostigmine x 2  - s/p Relistor, last dose 12/1  - s/p colectomy with end ileostomy  (see above)  - ileostomy with >1L output, consider adding bulking agents  - Daily AXR     [] CMV viremia  - d/c gancylovir due to thrombocytopenia  - CMV PCR (12/11 and 12/16): neg, positive CMVPCR on 12/23 repeat CMV pending from 12/24    [ ] AMS  - Reintubated 11/20 Aspiration/hypoxia, extubated 11/24->[id number]/24--> extubated 11/26 -> [id number]/7 -> tracheostomy 12/17  - EEG 11/30 negative, Neurology following  - BH following   - off tacro  - on keppra  -CT Head No Cont (12/20): Age-indeterminate posterior left frontal lobe infarct.   -CT Head (12/25): Evolving subacute infarct in the left supramarginal gyrus  - SICU stated MRI not needed     [ ] HTN/ pAFib  [ ] coronary vasospasm vs posterior wall MI  - EKG 12/24 c/f ST depression, rising troponins: 1800s  - d/c argatroban and aspirin due to bleeding  - Heparin gtt 12/19-21  - Cards- plan for PCI prior to DC   - Off Amiodarone, off dobutamine  - Off metoprolol for soft bp.  - Dr. Quintanilla following    [ ] DM  - ISS     []Scrotal abscess   - US (12/12): 2.1 x0.9 x 3.2 cm focal, right testicle- possible abscess (12/12)  - Urology recs: CT scrotum review no debridement required  - Urology recs Derm consult for guidance on wound care   - 12/23 US doppler scrotum: no arterial flow, limited venous flow, bl hydrocele  - 12/15 CT CAP no acute changes

## 2024-12-28 NOTE — PROGRESS NOTE ADULT - ASSESSMENT
ASSESSMENT: 74 male w/ a PMHx of HTN, DM, AF, BPH, MASH cirrhosis and HCC s/p OLT on 11/15. Case was uneventful but post-op course has been prolonged and c/b delirium, aspiration, multiple episodes of acute hypoxic respiratory failure requiring reintubation from 11/20-11/24 & 11/24-11/26, AF w/ RVR, ileus requiring NGT, distended colon requiring rectal tube, dysphagia, malnutrition, hypernatremia, fevers, pancytopenia, poorly controlled glucoses, and left brachial VTE. Patient went into severe refractory septic shock on 12/6 w/ blood cultures positive for E. coli s/p s/p ex lap, total abd colectomy, end ileostomy, 3 intentionally retained laparotomy pads for bleeding, Abthera, IABP placement w/ admission to CTICU, Patient required inotropes, Jacqui, and CRRT post-op. s/p closure 12/9. IABP removed 12/10 and transferred back to SICU 12/13. SICU course c/b narrow complex arrythmia w/ ECG showing new ST elevations concerning for posterior wall MI vs vasospasms, cards consulted and started on heparin gtt for empiric ACS tx which was c/b GIB then transitioned to argatroban c/b bleed. TTE revealed LVOT obstruction & TODD.     PLAN:    Neuro:  - Pain: PRN oxy solution  - Opens eyes intermittently, Not following commands, off sedation  - CT head 11/19, 11/21, 11/25, 11/29, 12/5 negative  - EEG: Mild diffuse cerebral dysfunction that is not specific in etiology. No epileptic discharges recorded. No seizures recorded.  - Holding home xanax, Abilify, Zoloft, Remeron, Lexapro  - CT head 12/20 showing age-indeterminate infarct, f/u Neurology recs  - No need for MRI     Resp:  - S/p bedside tracheostomy 12/17  - PRVC 14/480/5/30  - SBT daily, full support overnight  - c/w inhaled bronchodilator    CV:  - Pressors: vaso, Aldo, off levo   - s/p dobutamine and amiodarone gtt   - home metoprolol 12.5 q8hr held for pressor req  - IABP removed 12/10  - TTE 12/6 w/ LVOT obstruction & TODD  - TTE 12/11 shows EF of 55-60%    GI:  - trend LFTs  - NPO, NGT (can be  to gravity tube feeds or to gravity, no suction)   - protonix gtt  - monitor ALYSSA output    /Renal:  - CRRT restarted 12/23  - Monitor BUN/Cr, I&Os, trend UOP  - Scrotal US 12/15 -> edema, no abscess -> elevate  - repeat scrotal doppler for concern of ischemia > low flow state, low concern for abscess  - Albumin 5% 250 cc q8hr for 3 doses  - Bladder scan q12    Heme:  - trend H/H, PLTs, coags  - hep gtt and argatroban gtt held i/s/o bleed  - 12/27: 2 PRBC, 1 ffp, 1 plt  - s/p desmopression 30 x 1 on 12/26    ID:  - ABX: jeniffer, caspo, bactrim, minocycline  - 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 Case was uneventful but post-op course has been prolonged and c/b delirium, aspiration, multiple episodes of acute hypoxic respiratory failure requiring reintubation from 11/20-11/24 & 11/24-11/26, AF w/ RVR, ileus requiring NGT, distended colon requiring rectal tube, dysphagia, malnutrition, hypernatremia, fevers, pancytopenia, poorly controlled glucoses, and left brachial VTE. Patient went into severe refractory septic shock on 12/6 w/ blood cultures positive for E. coli s/p s/p ex lap, total abd colectomy, end ileostomy, 3 intentionally retained laparotomy pads for bleeding, Abthera, IABP placement w/ admission to CTICU, Patient required inotropes, Jacqui, and CRRT post-op. s/p closure 12/9. IABP removed 12/10 and transferred back to SICU 12/13. SICU course c/b narrow complex arrythmia w/ ECG showing new ST elevations concerning for posterior wall MI vs vasospasms, cards consulted and started on heparin gtt for empiric ACS tx which was c/b GIB then transitioned to argatroban c/b bleed. TTE revealed LVOT obstruction & TODD.     PLAN:    Neuro:  - Pain: PRN oxy solution  - Opens eyes intermittently, Not following commands, off sedation  - CT head 11/19, 11/21, 11/25, 11/29, 12/5 negative  - EEG: Mild diffuse cerebral dysfunction that is not specific in etiology. No epileptic discharges recorded. No seizures recorded.  - Holding home xanax, Abilify, Zoloft, Remeron, Lexapro  - CT head 12/20 showing age-indeterminate infarct, f/u Neurology recs  - No need for MRI     Resp:  - S/p bedside tracheostomy 12/17  - PRVC 14/480/5/30  - SBT daily, full support overnight  - c/w inhaled bronchodilator    CV:  - Pressors: vaso, Aldo, off levo   - s/p dobutamine and amiodarone gtt   - home metoprolol 12.5 q8hr held for pressor req  - IABP removed 12/10  - TTE 12/6 w/ LVOT obstruction & TODD  - TTE 12/11 shows EF of 55-60%    GI:  - trend LFTs  - NPO, NGT (can be  to gravity tube feeds or to gravity, no suction)   - protonix gtt  - monitor ALYSSA output    /Renal:  - CRRT restarted 12/23  - Monitor BUN/Cr, I&Os, trend UOP  - Scrotal US 12/15 -> edema, no abscess -> elevate  - repeat scrotal doppler for concern of ischemia > low flow state, low concern for abscess  - Albumin 5% 250 cc q8hr for 3 doses  - Bladder scan q12    Heme:  - trend H/H, PLTs, coags  - hep gtt and argatroban gtt held i/s/o bleed  - 12/27: 2 PRBC, 1 ffp, 1 plt  - s/p desmopression 30 x 1 on 12/26    ID:  - ABX: jeniffer, caspo, bactrim, minocycline  - 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  ASSESSMENT: 74 male w/ a PMHx of HTN, DM, AF, BPH, MASH cirrhosis and HCC s/p OLT on 11/15. Case was uneventful but post-op course has been prolonged and c/b delirium, aspiration, multiple episodes of acute hypoxic respiratory failure requiring reintubation from 11/20-11/24 & 11/24-11/26, AF w/ RVR, ileus requiring NGT, distended colon requiring rectal tube, dysphagia, malnutrition, hypernatremia, fevers, pancytopenia, poorly controlled glucoses, and left brachial VTE. Patient went into severe refractory septic shock on 12/6 w/ blood cultures positive for E. coli s/p s/p ex lap, total abd colectomy, end ileostomy, 3 intentionally retained laparotomy pads for bleeding, Abthera, IABP placement w/ admission to CTICU, Patient required inotropes, Jacqui, and CRRT post-op. s/p closure 12/9. IABP removed 12/10 and transferred back to SICU 12/13. SICU course c/b narrow complex arrythmia w/ ECG showing new ST elevations concerning for posterior wall MI vs vasospasms, cards consulted and started on heparin gtt for empiric ACS tx which was c/b GIB then transitioned to argatroban c/b bleed. TTE revealed LVOT obstruction & TODD.     PLAN:    Neuro:  - Pain: PRN oxy solution  - Opens eyes intermittently, Not following commands, off sedation  - CT head 11/19, 11/21, 11/25, 11/29, 12/5 negative  - EEG: Mild diffuse cerebral dysfunction that is not specific in etiology. No epileptic discharges recorded. No seizures recorded.  - Holding home xanax, Abilify, Zoloft, Remeron, Lexapro  - CT head 12/20 showing age-indeterminate infarct, f/u Neurology recs  - No need for MRI     Resp:  - S/p bedside tracheostomy 12/17  - PRVC 14/480/5/30  - SBT daily, full support overnight  - c/w inhaled bronchodilator    CV:  - Pressors: vaso, Aldo, off levo   - s/p dobutamine and amiodarone gtt   - home metoprolol 12.5 q8hr held for pressor req  - IABP removed 12/10  - TTE 12/6 w/ LVOT obstruction & TODD  - TTE 12/11 shows EF of 55-60%    GI:  - trend LFTs  - NPO, NGT (can be  to gravity tube feeds or to gravity, no suction)   - protonix gtt  - monitor ALYSSA output    /Renal:  - CRRT restarted 12/23  - Monitor BUN/Cr, I&Os, trend UOP  - Scrotal US 12/15 -> edema, no abscess -> elevate  - repeat scrotal doppler for concern of ischemia > low flow state, low concern for abscess  - Albumin 5% 250 cc q8hr for 3 doses  - Bladder scan q12    Heme:  - trend H/H, PLTs, coags  - hep gtt and argatroban gtt held i/s/o bleed  - 12/27: 2 PRBC, 1 ffp, 1 plt  - s/p desmopression 30 x 1 on 12/26    ID:  - ABX: jeniffer, caspo, bactrim, minocycline  - 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

## 2024-12-28 NOTE — PROGRESS NOTE ADULT - SUBJECTIVE AND OBJECTIVE BOX
Subjective  Patient seen and examined at the bedside. Interview limited d/t mental status of patient.     Objective    Vital signs  T(F): , Max: 98.8 (12-27-24 @ 11:00)  HR: 102 (12-28-24 @ 08:30)  BP: --  SpO2: 100% (12-28-24 @ 08:30)  Wt(kg): --    Output     OUT:    Bulb (mL): 350 mL    Bulb (mL): 65 mL    Intermittent Catheterization - Urethral (mL): 150 mL  Total OUT: 565 mL    Total NET: -565 mL          : Scrotal skin with brown/green discoloration grossly, with patches of black discoloration/possible eschar. On palpation, scrotum non-indurated, and no appreciation of crepitus.     Labs      12-28 @ 06:19    WBC 8.16  / Hct 23.0  / SCr 0.38     12-28 @ 00:13    WBC 9.17  / Hct 21.7  / SCr 0.40         Culture - Body Fluid with Gram Stain (collected 12-26-24 @ 19:36)  Source: Body Fluid  Gram Stain (12-27-24 @ 07:04):    polymorphonuclear leukocytes seen    No organisms seen    by cytocentrifuge  Preliminary Report (12-27-24 @ 19:08):    No growth    Culture - Body Fluid with Gram Stain (collected 12-26-24 @ 19:33)  Source: Body Fluid  Gram Stain (12-27-24 @ 07:00):    polymorphonuclear leukocytes seen    Gram positive cocci in pairs seen    by cytocentrifuge    Culture - Blood (collected 12-24-24 @ 03:30)  Source: .Blood BLOOD  Preliminary Report (12-28-24 @ 07:00):    No growth at 4 days    Culture - Bronchial (collected 12-23-24 @ 16:37)  Source: Combi-Cath  Gram Stain (12-23-24 @ 22:50):    No polymorphonuclear cells seen per low power field    No squamous epithelial cells per low power field    No organisms seen per oil power field  Final Report (12-25-24 @ 08:06):    Commensal charity consistent with body site    Culture - Blood (collected 12-23-24 @ 16:03)  Source: .Blood BLOOD  Preliminary Report (12-27-24 @ 20:00):    No growth at 4 days          Imaging

## 2024-12-28 NOTE — PROGRESS NOTE ADULT - ASSESSMENT
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 Post op course complicated by acute hypoxic respiratory failure requiring intubation multiple times, most recently on 12/7 with conversion to tracheostomy on 12/17, e.coli bacteremia with RTOR on 12/7 for concern for worsening septic shock and cardiogenic shock s/p balloon pump placement and total abdominal colectomy in setting of ischemic bowel s/p OR on 12/9 for ileostomy creation, and olirugirc MORAIMA requiring CRRT and now intermittent HD.    Diagnosed with pneumonia secondary to Stenotrophomonas    Also with growth of Enterococcus faecalis from abdominal drains and urine culture    More fever and shock event on 12/23/24    UA (12/19) 2 WBC  BCx (12/23) NGTD  Bronch Cx (12/23) NGTD    CT Chest (12/23) Bibasilar groundglass and tree in bud opacities which may represent distal airway impaction versus pneumonia.    CT A/P (12/23) Peripheral wedge-shaped areas of hypoattenuation involving the superior aspect of the spleen, suggestive of splenic infarcts (12-59). Mildly dilated loops of proximal small bowel without transition point, likely ileus.    Testicular US (12/23) No appreciable arterial flow with very limited venous flow in in the testes bilaterally. Interval decrease in diffuse scrotal edema. Small bilateral hydroceles.    CT Pelvis (12/25) Moderate diffuse subcutaneous/scrotal edema without defined collection or subcutaneous air.    Shock state overnight in context of likely GIB    Antibiotic Course:  Meropenem ( 11/22-11/29, 11/22-11/29, 12/16-)   Minocycline (12/21-)  Bactrim (11/16-11/24, 12/8-12/11, 12/21-)  Fluconazole (11/16-12/2, 12/12-12/16)   Caspofungin ( 12/6-12/11, 12/17-)  Cefepime (12/10-12/16)   Metronidazole (12/10-12/14)   Ganciclovir (12/7-12/13)   Linezolid (11/23-11/26, 12/6-12/11)   Atovaquone (11/29-12/6)   Zosyn (11/20-11/22,12/6)   Tobramycin (12/6)   Valganciclovir (11/6-12/4)    #Positive Blood Culture (12/23 GPR, BCID PCR Negative)  --Stop Vancomycin, Start Linezolid. Hopefully this organism is skin contaminant but GI translocation is possible.   --Repeat 2x blood culturs     #Leukocytosis, Fever, Shock, Transaminitis, Stenotrophomonas Pneumonia  --If further worsening shock overnight would repeat blood cultures and administer Tobramycin 600 mg IV x1 (7 mg/kg based on adjusted body weight).   --Stop IV Bactrim treatment dose and switch to PPX  --Continue Minocycline 200 mg IV Q12H  --Continue Meropenem 1g IV Q12H  --Doubt need for Caspofungin; no growth of fungal organisms    #Liver Transplant Recipient, Prophylactic Antibiotic  --Repeat CMV PCR on 12/30  --Stop IV Bactrim treatment dose and switch to PPX    I will continue to follow. Please feel free to contact me with any further questions.    Kyle Junior M.D.  [facility] Division of Infectious Disease  8AM-5PM Monday - Friday: Available on [employer] Teams  After Hours and Holidays (or if no response on [employer] Teams): Please contact the Infectious Diseases Office at [phone]    The above assessment and plan were discussed with SICU Team

## 2024-12-28 NOTE — PROGRESS NOTE ADULT - SUBJECTIVE AND OBJECTIVE BOX
24 HOUR EVENTS:  - 1 U PRBC for Hgb < 8 overnight > did not respond > additional unit ordered  - 1u platelets, 1u FFP to correct TEG  - restarted Trickle feeds @10cc, IVL  - 250cc albumin q8 for 3 doses ordered  - D/c'ed fentanyl patch, but started oxy solution PRN    NEURO  Exam: follows commands  Meds: oxyCODONE    Solution 5 milliGRAM(s) Oral every 4 hours PRN Severe Pain (7 - 10)  oxyCODONE    Solution 2.5 milliGRAM(s) Oral every 4 hours PRN Moderate Pain (4 - 6)      RESPIRATORY  RR: 61 (12-27-24 @ 23:15) (16 - 61)  SpO2: 100% (12-27-24 @ 23:15) (98% - 100%)  Exam: trahced  Mechanical Ventilation: Mode: AC/ CMV (Assist Control/ Continuous Mandatory Ventilation), RR (machine): 14, RR (patient): 19, TV (machine): 450, FiO2: 30, PEEP: 5, ITime: 1, MAP: 6.9, PIP: 14  ABG - ( 28 Dec 2024 00:00 )  pH: 7.46  /  pCO2: 30    /  pO2: 107   / HCO3: 21    / Base Excess: -2.1  /  SaO2: 100.0       Meds: albuterol/ipratropium for Nebulization 3 milliLiter(s) Nebulizer every 6 hours PRN Shortness of Breath and/or Wheezing  buDESOnide    Inhalation Suspension 0.5 milliGRAM(s) Inhalation every 12 hours      CARDIOVASCULAR  HR: 93 (12-27-24 @ 23:15) (89 - 101)  ABP: 120/53 (12-27-24 @ 23:15) (106/49 - 131/53)  ABP(mean): 79 (12-27-24 @ 23:15) (68 - 86)      Exam:   Cardiac Rhythm:   Perfusion     [ x]Adequate   [ ]Inadequate  Mentation   [ ]Normal       [ x]Reduced  Extremities  [x ]Warm         [ ]Cool  Volume Status [ ]Hypervolemic [x ]Euvolemic [ ]Hypovolemic  Meds: norepinephrine Infusion 0.05 MICROgram(s)/kG/Min IV Continuous <Continuous>  phenylephrine    Infusion 0.1 MICROgram(s)/kG/Min IV Continuous <Continuous>      GI/NUTRITION  Exam: soft, NT/ND  Diet: trickle feeds  Meds: pantoprazole Infusion 8 mG/Hr IV Continuous <Continuous>      GENITOURINARY  I&O's Detail    12-26 @ 07:01 - 12-27 @ 07:00  --------------------------------------------------------  IN:    Albumin 5%  - 500 mL: 1500 mL    Cryoprecipitate: 150 mL    dextrose 10% + sodium chloride 0.9%: 600 mL    dextrose 20%: 40 mL    dextrose 5% + sodium chloride 0.9%: 450 mL    Enteral Tube Flush: 40 mL    IV PiggyBack: 625 mL    IV PiggyBack: 850 mL    IV PiggyBack: 1499.8 mL    Norepinephrine: 5.6 mL    Norepinephrine: 76 mL    Pantoprazole: 210 mL    Phenylephrine: 891.2 mL    Plasma: 300 mL    Platelets - Single Donor: 450 mL    PRBCs (Packed Red Blood Cells): 900 mL    Propofol: 20.9 mL    Vasopressin: 108 mL  Total IN: 8716.5 mL    OUT:    Bulb (mL): 530 mL    Bulb (mL): 975 mL    Ileostomy (mL): 2120 mL    Indwelling Catheter - Urethral (mL): 20 mL    Nasogastric/Oral tube (mL): 630 mL    Other (mL): 3166 mL    VAC (Vacuum Assisted Closure) System (mL): 0 mL  Total OUT: 7441 mL    Total NET: 1275.5 mL      12-27 @ 07:01 - 12-28 @ 00:21  --------------------------------------------------------  IN:    Albumin 5%  - 250 mL: 250 mL    dextrose 5% + sodium chloride 0.9%: 150 mL    IV PiggyBack: 50 mL    IV PiggyBack: 450 mL    IV PiggyBack: 100 mL    Norepinephrine: 43.6 mL    Pantoprazole: 150 mL    Phenylephrine: 575.9 mL    Plasma: 300 mL    Platelets - Single Donor: 225 mL    Vasopressin: 67.5 mL  Total IN: 2362 mL    OUT:    Bulb (mL): 210 mL    Bulb (mL): 25 mL    Ileostomy (mL): 420 mL    Other (mL): 1527 mL  Total OUT: 2182 mL    Total NET: 180 mL          12-27    134[L]  |  105  |  19  ----------------------------<  187[H]  3.9   |  21[L]  |  0.42[L]    Ca    8.1[L]      27 Dec 2024 18:04  Phos  1.8     12-27  Mg     2.3     12-27    TPro  3.7[L]  /  Alb  2.9[L]  /  TBili  13.4[H]  /  DBili  x   /  AST  536[H]  /  ALT  293[H]  /  AlkPhos  146[H]  12-27    Meds: albumin human  5% IVPB 250 milliLiter(s) IV Intermittent every 8 hours  calcium gluconate IVPB 2 Gram(s) IV Intermittent every 6 hours      HEMATOLOGIC  Meds:                         8.2    8.13  )-----------( 35       ( 27 Dec 2024 18:04 )             22.7     PT/INR - ( 27 Dec 2024 18:04 )   PT: 21.3 sec;   INR: 1.86 ratio         PTT - ( 27 Dec 2024 18:04 )  PTT:57.8 sec    INFECTIOUS DISEASES  T(C): 36.9 (12-27-24 @ 23:00), Max: 37.1 (12-27-24 @ 11:00)  Wt(kg): --  WBC Count: 8.13 K/uL (12-27 @ 18:04)  WBC Count: 8.89 K/uL (12-27 @ 12:10)  WBC Count: 7.43 K/uL (12-27 @ 05:52)    Recent Cultures:  Specimen Source: Body Fluid, 12-26 @ 19:36; Results   No growth; Gram Stain:   polymorphonuclear leukocytes seen  No organisms seen  by cytocentrifuge; Organism: --  Specimen Source: Body Fluid, 12-26 @ 19:33; Results --; Gram Stain:   polymorphonuclear leukocytes seen  Gram positive cocci in pairs seen  by cytocentrifuge[!]; Organism: --  Specimen Source: .Blood BLOOD, 12-24 @ 03:30; Results   No growth at 72 Hours; Gram Stain: --; Organism: --  Specimen Source: Combi-Cath, 12-23 @ 16:37; Results   Commensal charity consistent with body site; Gram Stain:   No polymorphonuclear cells seen per low power field  No squamous epithelial cells per low power field  No organisms seen per oil power field; Organism: --  Specimen Source: .Blood BLOOD, 12-23 @ 16:03; Results   No growth at 4 days; Gram Stain: --; Organism: --    Meds: caspofungin IVPB      caspofungin IVPB 50 milliGRAM(s) IV Intermittent every 24 hours  meropenem  IVPB 1000 milliGRAM(s) IV Intermittent every 12 hours  minocycline IVPB      minocycline IVPB 200 milliGRAM(s) IV Intermittent every 12 hours  trimethoprim / sulfamethoxazole IVPB 390 milliGRAM(s) IV Intermittent every 12 hours      ENDOCRINE  Capillary Blood Glucose    Meds: insulin lispro (ADMELOG) corrective regimen sliding scale   SubCutaneous every 6 hours  methylPREDNISolone sodium succinate Injectable 32 milliGRAM(s) IV Push <User Schedule>  vasopressin Infusion 0.03 Unit(s)/Min IV Continuous <Continuous>      ACCESS DEVICES:  [ ] Peripheral IV  [ ] Central Venous Line		[ ] R	[ ] L	[ ] IJ	[ ] Fem	[ ] SC	Placed:   [ ] Arterial Line			[ ] R	[ ] L	[ ] Fem	[ ] Rad	[ ] Ax	Placed:   [ ] PICC:					[ ] Mediport  [ ] Urinary Catheter, Date Placed:   [ ] Necessity of urinary, arterial, and venous catheters discussed    OTHER MEDICATIONS:  chlorhexidine 0.12% Liquid 15 milliLiter(s) Oral Mucosa every 12 hours  chlorhexidine 2% Cloths 1 Application(s) Topical <User Schedule>  CRRT Treatment    <Continuous>  mupirocin 2% Ointment 1 Application(s) Topical every 12 hours  nystatin Powder 1 Application(s) Topical every 12 hours  Phoxillum Filtration BK 4 / 2.5 5000 milliLiter(s) CRRT <Continuous>  Phoxillum Filtration BK 4 / 2.5 5000 milliLiter(s) CRRT <Continuous>  PrismaSATE Dialysate BGK 4 / 2.5 5000 milliLiter(s) CRRT <Continuous>      IMAGING: 24 HOUR EVENTS:  - 1 U PRBC for Hgb < 8 overnight > did not respond > additional unit ordered  - 1u platelets, 1u FFP to correct TEG  - restarted Trickle feeds @10cc, IVL  - 250cc albumin q8 for 3 doses ordered  - D/c'ed fentanyl patch, but started oxy solution PRN  - 1 U PRBC for Hgb < 8 and 1 U FFP for TEG correction overnight     NEURO  Exam: follows commands  Meds: oxyCODONE    Solution 5 milliGRAM(s) Oral every 4 hours PRN Severe Pain (7 - 10)  oxyCODONE    Solution 2.5 milliGRAM(s) Oral every 4 hours PRN Moderate Pain (4 - 6)      RESPIRATORY  RR: 61 (12-27-24 @ 23:15) (16 - 61)  SpO2: 100% (12-27-24 @ 23:15) (98% - 100%)  Exam: trahced  Mechanical Ventilation: Mode: AC/ CMV (Assist Control/ Continuous Mandatory Ventilation), RR (machine): 14, RR (patient): 19, TV (machine): 450, FiO2: 30, PEEP: 5, ITime: 1, MAP: 6.9, PIP: 14  ABG - ( 28 Dec 2024 00:00 )  pH: 7.46  /  pCO2: 30    /  pO2: 107   / HCO3: 21    / Base Excess: -2.1  /  SaO2: 100.0       Meds: albuterol/ipratropium for Nebulization 3 milliLiter(s) Nebulizer every 6 hours PRN Shortness of Breath and/or Wheezing  buDESOnide    Inhalation Suspension 0.5 milliGRAM(s) Inhalation every 12 hours      CARDIOVASCULAR  HR: 93 (12-27-24 @ 23:15) (89 - 101)  ABP: 120/53 (12-27-24 @ 23:15) (106/49 - 131/53)  ABP(mean): 79 (12-27-24 @ 23:15) (68 - 86)      Exam:   Cardiac Rhythm:   Perfusion     [ x]Adequate   [ ]Inadequate  Mentation   [ ]Normal       [ x]Reduced  Extremities  [x ]Warm         [ ]Cool  Volume Status [ ]Hypervolemic [x ]Euvolemic [ ]Hypovolemic  Meds: norepinephrine Infusion 0.05 MICROgram(s)/kG/Min IV Continuous <Continuous>  phenylephrine    Infusion 0.1 MICROgram(s)/kG/Min IV Continuous <Continuous>      GI/NUTRITION  Exam: soft, NT/ND  Diet: trickle feeds  Meds: pantoprazole Infusion 8 mG/Hr IV Continuous <Continuous>      GENITOURINARY  I&O's Jackie    12-26 @ 07:01 - 12-27 @ 07:00  --------------------------------------------------------  IN:    Albumin 5%  - 500 mL: 1500 mL    Cryoprecipitate: 150 mL    dextrose 10% + sodium chloride 0.9%: 600 mL    dextrose 20%: 40 mL    dextrose 5% + sodium chloride 0.9%: 450 mL    Enteral Tube Flush: 40 mL    IV PiggyBack: 625 mL    IV PiggyBack: 850 mL    IV PiggyBack: 1499.8 mL    Norepinephrine: 5.6 mL    Norepinephrine: 76 mL    Pantoprazole: 210 mL    Phenylephrine: 891.2 mL    Plasma: 300 mL    Platelets - Single Donor: 450 mL    PRBCs (Packed Red Blood Cells): 900 mL    Propofol: 20.9 mL    Vasopressin: 108 mL  Total IN: 8716.5 mL    OUT:    Bulb (mL): 530 mL    Bulb (mL): 975 mL    Ileostomy (mL): 2120 mL    Indwelling Catheter - Urethral (mL): 20 mL    Nasogastric/Oral tube (mL): 630 mL    Other (mL): 3166 mL    VAC (Vacuum Assisted Closure) System (mL): 0 mL  Total OUT: 7441 mL    Total NET: 1275.5 mL      12-27 @ 07:01 - 12-28 @ 00:21  --------------------------------------------------------  IN:    Albumin 5%  - 250 mL: 250 mL    dextrose 5% + sodium chloride 0.9%: 150 mL    IV PiggyBack: 50 mL    IV PiggyBack: 450 mL    IV PiggyBack: 100 mL    Norepinephrine: 43.6 mL    Pantoprazole: 150 mL    Phenylephrine: 575.9 mL    Plasma: 300 mL    Platelets - Single Donor: 225 mL    Vasopressin: 67.5 mL  Total IN: 2362 mL    OUT:    Bulb (mL): 210 mL    Bulb (mL): 25 mL    Ileostomy (mL): 420 mL    Other (mL): 1527 mL  Total OUT: 2182 mL    Total NET: 180 mL          12-27    134[L]  |  105  |  19  ----------------------------<  187[H]  3.9   |  21[L]  |  0.42[L]    Ca    8.1[L]      27 Dec 2024 18:04  Phos  1.8     12-27  Mg     2.3     12-27    TPro  3.7[L]  /  Alb  2.9[L]  /  TBili  13.4[H]  /  DBili  x   /  AST  536[H]  /  ALT  293[H]  /  AlkPhos  146[H]  12-27    Meds: albumin human  5% IVPB 250 milliLiter(s) IV Intermittent every 8 hours  calcium gluconate IVPB 2 Gram(s) IV Intermittent every 6 hours      HEMATOLOGIC  Meds:                         8.2    8.13  )-----------( 35       ( 27 Dec 2024 18:04 )             22.7     PT/INR - ( 27 Dec 2024 18:04 )   PT: 21.3 sec;   INR: 1.86 ratio         PTT - ( 27 Dec 2024 18:04 )  PTT:57.8 sec    INFECTIOUS DISEASES  T(C): 36.9 (12-27-24 @ 23:00), Max: 37.1 (12-27-24 @ 11:00)  Wt(kg): --  WBC Count: 8.13 K/uL (12-27 @ 18:04)  WBC Count: 8.89 K/uL (12-27 @ 12:10)  WBC Count: 7.43 K/uL (12-27 @ 05:52)    Recent Cultures:  Specimen Source: Body Fluid, 12-26 @ 19:36; Results   No growth; Gram Stain:   polymorphonuclear leukocytes seen  No organisms seen  by cytocentrifuge; Organism: --  Specimen Source: Body Fluid, 12-26 @ 19:33; Results --; Gram Stain:   polymorphonuclear leukocytes seen  Gram positive cocci in pairs seen  by cytocentrifuge[!]; Organism: --  Specimen Source: .Blood BLOOD, 12-24 @ 03:30; Results   No growth at 72 Hours; Gram Stain: --; Organism: --  Specimen Source: Combi-Cath, 12-23 @ 16:37; Results   Commensal charity consistent with body site; Gram Stain:   No polymorphonuclear cells seen per low power field  No squamous epithelial cells per low power field  No organisms seen per oil power field; Organism: --  Specimen Source: .Blood BLOOD, 12-23 @ 16:03; Results   No growth at 4 days; Gram Stain: --; Organism: --    Meds: caspofungin IVPB      caspofungin IVPB 50 milliGRAM(s) IV Intermittent every 24 hours  meropenem  IVPB 1000 milliGRAM(s) IV Intermittent every 12 hours  minocycline IVPB      minocycline IVPB 200 milliGRAM(s) IV Intermittent every 12 hours  trimethoprim / sulfamethoxazole IVPB 390 milliGRAM(s) IV Intermittent every 12 hours      ENDOCRINE  Capillary Blood Glucose    Meds: insulin lispro (ADMELOG) corrective regimen sliding scale   SubCutaneous every 6 hours  methylPREDNISolone sodium succinate Injectable 32 milliGRAM(s) IV Push <User Schedule>  vasopressin Infusion 0.03 Unit(s)/Min IV Continuous <Continuous>      ACCESS DEVICES:  [ ] Peripheral IV  [ ] Central Venous Line		[ ] R	[ ] L	[ ] IJ	[ ] Fem	[ ] SC	Placed:   [ ] Arterial Line			[ ] R	[ ] L	[ ] Fem	[ ] Rad	[ ] Ax	Placed:   [ ] PICC:					[ ] Mediport  [ ] Urinary Catheter, Date Placed:   [ ] Necessity of urinary, arterial, and venous catheters discussed    OTHER MEDICATIONS:  chlorhexidine 0.12% Liquid 15 milliLiter(s) Oral Mucosa every 12 hours  chlorhexidine 2% Cloths 1 Application(s) Topical <User Schedule>  CRRT Treatment    <Continuous>  mupirocin 2% Ointment 1 Application(s) Topical every 12 hours  nystatin Powder 1 Application(s) Topical every 12 hours  Phoxillum Filtration BK 4 / 2.5 5000 milliLiter(s) CRRT <Continuous>  Phoxillum Filtration BK 4 / 2.5 5000 milliLiter(s) CRRT <Continuous>  PrismaSATE Dialysate BGK 4 / 2.5 5000 milliLiter(s) CRRT <Continuous>      IMAGING:

## 2024-12-28 NOTE — PROGRESS NOTE ADULT - SUBJECTIVE AND OBJECTIVE BOX
Transplant Surgery - Multidisciplinary Rounds  --------------------------------------------------------------  OLT   11/15/2024        POD#43  Colectomy 12/7/2024   POD#21  IABP 12/7 removed 12/10  Tracheostomy 12/17/2024    Present:   Patient seen and examined with multidisciplinary Transplant team including Surgeon: Dr. James, Dr. Sorto, JAZZ Nguyễn, Hepatologist: Dr. Hidalgo and ICU team in AM rounds.   Disciplines not in attendance will be notified of the plan.     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     s/p OLT 11/15 with post op course c/b:  ·	Hypoxia: Reintubated 11/20, extubated 11/24->reintubated 11/24, extubated 11/26, reintubated 12/7, trached 12/17  ·	Ileus   ·	A fib  ·	AMS/Seizure   ·	L brachial DVT  ·	Neutropenia  ·	E coli Bacteremia (12/6)  ·	s/p Coloctomy 12/7.   ·	IABP 12/7, removed 12/10  ·	Ec Faecalis UTI (12/16)  ·	Stenotrophamonas in trach aspirate (12/19)  ·	UGIB 12/26    Interval/Overnight Events:   - afebrile, remains on levo/vaso/kassandra  - LFTs downtrending  - started on trickle feeds  - h/h continues to be unresponsive to prbc  - correcting per teg       Immunosuppression:  - Cyclo HELD , MMF HELD, Solu 32  -ongoing monitoring for signs of rejection      TX DATA HERE          ROS: Unable to assess patient is lethargic, s/p tracheostomy    PHYSICAL EXAM:   Constitutional: trach to vent, lethargic  Eyes:  PERRLA  ENMT: nc/at, no thrush   Neck: supple   Respiratory: CTA B/L  Cardiovascular: RRR  Gastrointestinal: incision clean/dry/intact + Ostomy pink, wound vac, ALYSSA x2 SS fluid  Genitourinary: +scrotal edema, Castro in place  Extremities: SCD's in place and working bilaterally, + LE edema  Neurological: trach to vent , sedated   Skin: no rashes, ulcerations, lesions  Transplant Surgery - Multidisciplinary Rounds  --------------------------------------------------------------  OLT   11/15/2024        POD#43  Colectomy 12/7/2024   POD#21  IABP 12/7 removed 12/10  Tracheostomy 12/17/2024    Present:   Patient seen and examined with multidisciplinary Transplant team including Surgeon: Dr. James, Dr. Sorto, JAZZ Nguyễn/Jasmine, Hepatologist: Dr. Hidalgo and ICU team in AM rounds.   Disciplines not in attendance will be notified of the plan.     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     s/p OLT 11/15 with post op course c/b:  ·	Hypoxia: Reintubated 11/20, extubated 11/24->reintubated 11/24, extubated 11/26, reintubated 12/7, trached 12/17  ·	Ileus   ·	A fib  ·	AMS/Seizure   ·	L brachial DVT  ·	Neutropenia  ·	E coli Bacteremia (12/6)  ·	s/p Coloctomy 12/7.   ·	IABP 12/7, removed 12/10  ·	Ec Faecalis UTI (12/16)  ·	Stenotrophamonas in trach aspirate (12/19)  ·	UGIB 12/26    Interval/Overnight Events:   - afebrile, remains on vaso/kassandra  - LFTs downtrending  - on trickle feeds  - h/h continues to be unresponsive to prbc  - correcting per teg      Immunosuppression:  - Cyclo 25mg QD , MMF HELD, Solu 32  -ongoing monitoring for signs of rejection      TX DATA HERE      ROS: Unable to assess patient is lethargic, s/p tracheostomy    PHYSICAL EXAM:   Constitutional: trach to vent, lethargic  Eyes:  PERRLA  ENMT: nc/at, no thrush   Neck: supple   Respiratory: CTA B/L  Cardiovascular: RRR  Gastrointestinal: incision clean/dry/intact + Ostomy pink, wound vac, ALYSSA x2 SS fluid  Genitourinary: +scrotal edema  Extremities: SCD's in place and working bilaterally, + LE edema  Neurological: trach to vent , sedated   Skin: no rashes, ulcerations, lesions  Transplant Surgery - Multidisciplinary Rounds  --------------------------------------------------------------  OLT   11/15/2024        POD#43  Colectomy 12/7/2024   POD#21  IABP 12/7 removed 12/10  Tracheostomy 12/17/2024    Present:   Patient seen and examined with multidisciplinary Transplant team including Surgeon: Dr. Vora, Dr. Sorto, JAZZ Nguyễn/Jasmine, Hepatologist: Dr. Hidalgo and ICU team in AM rounds.   Disciplines not in attendance will be notified of the plan.     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     s/p OLT 11/15 with post op course c/b:  ·	Hypoxia: Reintubated 11/20, extubated 11/24->reintubated 11/24, extubated 11/26, reintubated 12/7, trached 12/17  ·	Ileus   ·	A fib  ·	AMS/Seizure   ·	L brachial DVT  ·	Neutropenia  ·	E coli Bacteremia (12/6)  ·	s/p Coloctomy 12/7.   ·	IABP 12/7, removed 12/10  ·	Ec Faecalis UTI (12/16)  ·	Stenotrophamonas in trach aspirate (12/19)  ·	UGIB 12/26    Interval/Overnight Events:   - afebrile, remains on vaso/kassandra  - LFTs downtrending  - 1uRBC, 1u PLT, 1uFFP; correcting per teg  - cyclo and MMF on hold   - on trickle feeds @10cc, IVL   - repeat US       Immunosuppression:  - Cyclo per level, MMF HELD, Solu 32  -ongoing monitoring for signs of rejection        MEDICATIONS  (STANDING):  buDESOnide    Inhalation Suspension 0.5 milliGRAM(s) Inhalation every 12 hours  caspofungin IVPB      caspofungin IVPB 50 milliGRAM(s) IV Intermittent every 24 hours  chlorhexidine 0.12% Liquid 15 milliLiter(s) Oral Mucosa every 12 hours  chlorhexidine 2% Cloths 1 Application(s) Topical <User Schedule>  insulin lispro (ADMELOG) corrective regimen sliding scale   SubCutaneous every 6 hours  linezolid  IVPB      meropenem  IVPB 1000 milliGRAM(s) IV Intermittent every 12 hours  methylPREDNISolone sodium succinate Injectable 32 milliGRAM(s) IV Push <User Schedule>  minocycline IVPB      minocycline IVPB 200 milliGRAM(s) IV Intermittent every 12 hours  mupirocin 2% Ointment 1 Application(s) Topical every 12 hours  norepinephrine Infusion 0.05 MICROgram(s)/kG/Min (9.33 mL/Hr) IV Continuous <Continuous>  nystatin Powder 1 Application(s) Topical every 12 hours  pantoprazole Infusion 8 mG/Hr (10 mL/Hr) IV Continuous <Continuous>  phenylephrine    Infusion 1.5 MICROgram(s)/kG/Min (28 mL/Hr) IV Continuous <Continuous>  vasopressin Infusion 0.03 Unit(s)/Min (4.5 mL/Hr) IV Continuous <Continuous>    MEDICATIONS  (PRN):  albuterol/ipratropium for Nebulization 3 milliLiter(s) Nebulizer every 6 hours PRN Shortness of Breath and/or Wheezing  fentaNYL    Injectable 25 MICROGram(s) IV Push every 6 hours PRN Breakthrough Pain  FIRST- Mouthwash  BLM 10 milliLiter(s) Swish and Spit every 8 hours PRN Mouth Care  oxyCODONE    Solution 5 milliGRAM(s) Oral every 4 hours PRN Severe Pain (7 - 10)  oxyCODONE    Solution 2.5 milliGRAM(s) Oral every 4 hours PRN Moderate Pain (4 - 6)      PAST MEDICAL & SURGICAL HISTORY:  Diabetes      Transaminitis      Paroxysmal atrial fibrillation      Depression      BPH (benign prostatic hyperplasia)      Hypertension      Chronic atrial fibrillation      Coronary artery disease      Hepatocellular carcinoma      DM (diabetes mellitus)      HTN (hypertension)      Paroxysmal atrial fibrillation      Cirrhosis      HCC (hepatocellular carcinoma)      History of BPH      History of laparoscopic cholecystectomy      History of lumbar laminectomy      H/O prior ablation treatment      H/O percutaneous left heart catheterization          Vital Signs Last 24 Hrs  T(C): 36.8 (28 Dec 2024 15:00), Max: 37.1 (28 Dec 2024 07:00)  T(F): 98.2 (28 Dec 2024 15:00), Max: 98.8 (28 Dec 2024 07:00)  HR: 84 (28 Dec 2024 15:45) (84 - 118)  BP: --  BP(mean): --  RR: 14 (28 Dec 2024 15:45) (14 - 85)  SpO2: 98% (28 Dec 2024 15:45) (93% - 100%)    Parameters below as of 28 Dec 2024 15:00  Patient On (Oxygen Delivery Method): tracheostomy collar    O2 Concentration (%): 40    I&O's Summary    27 Dec 2024 07:01  -  28 Dec 2024 07:00  --------------------------------------------------------  IN: 3950.2 mL / OUT: 4594 mL / NET: -643.8 mL    28 Dec 2024 07:01  -  28 Dec 2024 16:04  --------------------------------------------------------  IN: 2321.4 mL / OUT: 1426 mL / NET: 895.4 mL                              7.7    8.70  )-----------( 22       ( 28 Dec 2024 12:29 )             22.0     12-28    136  |  103  |  19  ----------------------------<  196[H]  3.9   |  21[L]  |  0.37[L]    Ca    7.5[L]      28 Dec 2024 12:29  Phos  2.6     12-28  Mg     2.3     12-28    TPro  4.1[L]  /  Alb  3.0[L]  /  TBili  13.8[H]  /  DBili  x   /  AST  335[H]  /  ALT  232[H]  /  AlkPhos  142[H]  12-28          Culture - Body Fluid with Gram Stain (collected 12-26-24 @ 19:36)  Source: Body Fluid  Gram Stain (12-27-24 @ 07:04):    polymorphonuclear leukocytes seen    No organisms seen    by cytocentrifuge  Preliminary Report (12-27-24 @ 19:08):    No growth    Culture - Body Fluid with Gram Stain (collected 12-26-24 @ 19:33)  Source: Body Fluid  Gram Stain (12-27-24 @ 07:00):    polymorphonuclear leukocytes seen    Gram positive cocci in pairs seen    by cytocentrifuge  Preliminary Report (12-28-24 @ 09:48):    Rare Enterococcus faecalis    Culture - Blood (collected 12-24-24 @ 03:30)  Source: .Blood BLOOD  Preliminary Report (12-28-24 @ 07:00):    No growth at 4 days    Culture - Bronchial (collected 12-23-24 @ 16:37)  Source: Combi-Cath  Gram Stain (12-23-24 @ 22:50):    No polymorphonuclear cells seen per low power field    No squamous epithelial cells per low power field    No organisms seen per oil power field  Final Report (12-25-24 @ 08:06):    Commensal charity consistent with body site    Culture - Blood (collected 12-23-24 @ 16:03)  Source: .Blood BLOOD  Gram Stain (12-28-24 @ 12:55):    Growth in anaerobic bottle: Gram Positive Rods  Preliminary Report (12-28-24 @ 12:55):    Growth in anaerobic bottle: Gram Positive Rods    Direct identification is available within approximately 3-5    hours either by Blood Panel Multiplexed PCR or Direct    MALDI-TOF. Details: [email or web address]  Organism: Blood Culture PCR (12-28-24 @ 14:27)  Organism: Blood Culture PCR (12-28-24 @ 14:27)      ROS: Unable to assess patient is lethargic, s/p tracheostomy    PHYSICAL EXAM:   Constitutional: trach to vent, lethargic  Eyes:  PERRLA  ENMT: nc/at, no thrush   Neck: supple   Respiratory: CTA B/L  Cardiovascular: RRR  Gastrointestinal: incision clean/dry/intact + Ostomy pink, wound vac, ALYSSA x2 SS fluid  Genitourinary: +scrotal edema w/ large fluid collection; black discoloration   Extremities: SCD's in place and working bilaterally, + LE edema  Neurological: trach to vent , sedated   Skin: no rashes, ulcerations, lesions  Transplant Surgery - Multidisciplinary Rounds  --------------------------------------------------------------  OLT   11/15/2024        POD#43  Colectomy 12/7/2024   POD#21  IABP 12/7 removed 12/10  Tracheostomy 12/17/2024    Present:   Patient seen and examined with multidisciplinary Transplant team including Surgeon: Dr. Vora, Dr. Sorto, JAZZ Nguyễn/Jasmine, Hepatologist: Dr. Conteh and ICU team in AM rounds.   Disciplines not in attendance will be notified of the plan.     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     s/p OLT 11/15 with post op course c/b:  ·	Hypoxia: Reintubated 11/20, extubated 11/24->reintubated 11/24, extubated 11/26, reintubated 12/7, trached 12/17  ·	Ileus   ·	A fib  ·	AMS/Seizure   ·	L brachial DVT  ·	Neutropenia  ·	E coli Bacteremia (12/6)  ·	s/p Coloctomy 12/7.   ·	IABP 12/7, removed 12/10  ·	Ec Faecalis UTI (12/16)  ·	Stenotrophamonas in trach aspirate (12/19)  ·	UGIB 12/26    Interval/Overnight Events:   - afebrile, remains on vaso/kassandra  - LFTs downtrending  - 1uRBC, 1u PLT, 1uFFP; correcting per teg  - cyclo and MMF on hold   - on trickle feeds @10cc, IVL   - repeat US       Immunosuppression:  - Cyclo per level, MMF HELD, Solu 32  -ongoing monitoring for signs of rejection      MEDICATIONS  (STANDING):  buDESOnide    Inhalation Suspension 0.5 milliGRAM(s) Inhalation every 12 hours  caspofungin IVPB      caspofungin IVPB 50 milliGRAM(s) IV Intermittent every 24 hours  chlorhexidine 0.12% Liquid 15 milliLiter(s) Oral Mucosa every 12 hours  chlorhexidine 2% Cloths 1 Application(s) Topical <User Schedule>  insulin lispro (ADMELOG) corrective regimen sliding scale   SubCutaneous every 6 hours  linezolid  IVPB      meropenem  IVPB 1000 milliGRAM(s) IV Intermittent every 12 hours  methylPREDNISolone sodium succinate Injectable 32 milliGRAM(s) IV Push <User Schedule>  minocycline IVPB      minocycline IVPB 200 milliGRAM(s) IV Intermittent every 12 hours  mupirocin 2% Ointment 1 Application(s) Topical every 12 hours  norepinephrine Infusion 0.05 MICROgram(s)/kG/Min (9.33 mL/Hr) IV Continuous <Continuous>  nystatin Powder 1 Application(s) Topical every 12 hours  pantoprazole Infusion 8 mG/Hr (10 mL/Hr) IV Continuous <Continuous>  phenylephrine    Infusion 1.5 MICROgram(s)/kG/Min (28 mL/Hr) IV Continuous <Continuous>  vasopressin Infusion 0.03 Unit(s)/Min (4.5 mL/Hr) IV Continuous <Continuous>    MEDICATIONS  (PRN):  albuterol/ipratropium for Nebulization 3 milliLiter(s) Nebulizer every 6 hours PRN Shortness of Breath and/or Wheezing  fentaNYL    Injectable 25 MICROGram(s) IV Push every 6 hours PRN Breakthrough Pain  FIRST- Mouthwash  BLM 10 milliLiter(s) Swish and Spit every 8 hours PRN Mouth Care  oxyCODONE    Solution 5 milliGRAM(s) Oral every 4 hours PRN Severe Pain (7 - 10)  oxyCODONE    Solution 2.5 milliGRAM(s) Oral every 4 hours PRN Moderate Pain (4 - 6)      PAST MEDICAL & SURGICAL HISTORY:  Diabetes      Transaminitis      Paroxysmal atrial fibrillation      Depression      BPH (benign prostatic hyperplasia)      Hypertension      Chronic atrial fibrillation      Coronary artery disease      Hepatocellular carcinoma      DM (diabetes mellitus)      HTN (hypertension)      Paroxysmal atrial fibrillation      Cirrhosis      HCC (hepatocellular carcinoma)      History of BPH      History of laparoscopic cholecystectomy      History of lumbar laminectomy      H/O prior ablation treatment      H/O percutaneous left heart catheterization          Vital Signs Last 24 Hrs  T(C): 36.8 (28 Dec 2024 15:00), Max: 37.1 (28 Dec 2024 07:00)  T(F): 98.2 (28 Dec 2024 15:00), Max: 98.8 (28 Dec 2024 07:00)  HR: 84 (28 Dec 2024 15:45) (84 - 118)  BP: --  BP(mean): --  RR: 14 (28 Dec 2024 15:45) (14 - 85)  SpO2: 98% (28 Dec 2024 15:45) (93% - 100%)    Parameters below as of 28 Dec 2024 15:00  Patient On (Oxygen Delivery Method): tracheostomy collar    O2 Concentration (%): 40    I&O's Summary    27 Dec 2024 07:01  -  28 Dec 2024 07:00  --------------------------------------------------------  IN: 3950.2 mL / OUT: 4594 mL / NET: -643.8 mL    28 Dec 2024 07:01  -  28 Dec 2024 16:04  --------------------------------------------------------  IN: 2321.4 mL / OUT: 1426 mL / NET: 895.4 mL                              7.7    8.70  )-----------( 22       ( 28 Dec 2024 12:29 )             22.0     12-28    136  |  103  |  19  ----------------------------<  196[H]  3.9   |  21[L]  |  0.37[L]    Ca    7.5[L]      28 Dec 2024 12:29  Phos  2.6     12-28  Mg     2.3     12-28    TPro  4.1[L]  /  Alb  3.0[L]  /  TBili  13.8[H]  /  DBili  x   /  AST  335[H]  /  ALT  232[H]  /  AlkPhos  142[H]  12-28          Culture - Body Fluid with Gram Stain (collected 12-26-24 @ 19:36)  Source: Body Fluid  Gram Stain (12-27-24 @ 07:04):    polymorphonuclear leukocytes seen    No organisms seen    by cytocentrifuge  Preliminary Report (12-27-24 @ 19:08):    No growth    Culture - Body Fluid with Gram Stain (collected 12-26-24 @ 19:33)  Source: Body Fluid  Gram Stain (12-27-24 @ 07:00):    polymorphonuclear leukocytes seen    Gram positive cocci in pairs seen    by cytocentrifuge  Preliminary Report (12-28-24 @ 09:48):    Rare Enterococcus faecalis    Culture - Blood (collected 12-24-24 @ 03:30)  Source: .Blood BLOOD  Preliminary Report (12-28-24 @ 07:00):    No growth at 4 days    Culture - Bronchial (collected 12-23-24 @ 16:37)  Source: Combi-Cath  Gram Stain (12-23-24 @ 22:50):    No polymorphonuclear cells seen per low power field    No squamous epithelial cells per low power field    No organisms seen per oil power field  Final Report (12-25-24 @ 08:06):    Commensal charity consistent with body site    Culture - Blood (collected 12-23-24 @ 16:03)  Source: .Blood BLOOD  Gram Stain (12-28-24 @ 12:55):    Growth in anaerobic bottle: Gram Positive Rods  Preliminary Report (12-28-24 @ 12:55):    Growth in anaerobic bottle: Gram Positive Rods    Direct identification is available within approximately 3-5    hours either by Blood Panel Multiplexed PCR or Direct    MALDI-TOF. Details: [email or web address]  Organism: Blood Culture PCR (12-28-24 @ 14:27)  Organism: Blood Culture PCR (12-28-24 @ 14:27)      ROS: Unable to assess patient is lethargic, s/p tracheostomy    PHYSICAL EXAM:   Constitutional: trach to vent, lethargic  Eyes:  PERRLA  ENMT: nc/at, no thrush   Neck: supple   Respiratory: CTA B/L  Cardiovascular: RRR  Gastrointestinal: incision clean/dry/intact + Ostomy pink, wound vac, ALYSSA x2 SS fluid  Genitourinary: +scrotal edema w/ large fluid collection; black discoloration   Extremities: SCD's in place and working bilaterally, + LE edema  Neurological: trach to vent , sedated   Skin: no rashes, ulcerations, lesions

## 2024-12-29 NOTE — PROGRESS NOTE ADULT - SUBJECTIVE AND OBJECTIVE BOX
[facility] DIVISION OF KIDNEY DISEASES AND HYPERTENSION -- FOLLOW UP NOTE  --------------------------------------------------------------------------------  Chief Complaint:    24 hour events/subjective:        PAST HISTORY  --------------------------------------------------------------------------------  No significant changes to PMH, PSH, FHx, SHx, unless otherwise noted    ALLERGIES & MEDICATIONS  --------------------------------------------------------------------------------  Allergies    No Known Allergies    Intolerances      Standing Inpatient Medications  buDESOnide    Inhalation Suspension 0.5 milliGRAM(s) Inhalation every 12 hours  caspofungin IVPB      caspofungin IVPB 50 milliGRAM(s) IV Intermittent every 24 hours  chlorhexidine 0.12% Liquid 15 milliLiter(s) Oral Mucosa every 12 hours  chlorhexidine 2% Cloths 1 Application(s) Topical <User Schedule>  CRRT Treatment    <Continuous>  cycloSPORINE  , modified (GENGRAF) Solution 25 milliGRAM(s) Oral once  insulin lispro (ADMELOG) corrective regimen sliding scale   SubCutaneous every 6 hours  linezolid  IVPB      linezolid  IVPB 600 milliGRAM(s) IV Intermittent every 12 hours  meropenem  IVPB 1000 milliGRAM(s) IV Intermittent every 12 hours  methylPREDNISolone sodium succinate Injectable 32 milliGRAM(s) IV Push <User Schedule>  minocycline IVPB      minocycline IVPB 200 milliGRAM(s) IV Intermittent every 12 hours  mupirocin 2% Ointment 1 Application(s) Topical every 12 hours  nystatin Powder 1 Application(s) Topical every 12 hours  pantoprazole Infusion 8 mG/Hr IV Continuous <Continuous>  phenylephrine    Infusion 1.5 MICROgram(s)/kG/Min IV Continuous <Continuous>  Phoxillum Filtration BK 4 / 2.5 5000 milliLiter(s) CRRT <Continuous>  Phoxillum Filtration BK 4 / 2.5 5000 milliLiter(s) CRRT <Continuous>  PrismaSATE Dialysate BGK 4 / 2.5 5000 milliLiter(s) CRRT <Continuous>  trimethoprim / sulfamethoxazole IVPB 160 milliGRAM(s) IV Intermittent daily  vasopressin Infusion 0.03 Unit(s)/Min IV Continuous <Continuous>    PRN Inpatient Medications  albuterol/ipratropium for Nebulization 3 milliLiter(s) Nebulizer every 6 hours PRN  fentaNYL    Injectable 25 MICROGram(s) IV Push every 6 hours PRN  FIRST- Mouthwash  BLM 10 milliLiter(s) Swish and Spit every 8 hours PRN  oxyCODONE    Solution 5 milliGRAM(s) Oral every 4 hours PRN  oxyCODONE    Solution 2.5 milliGRAM(s) Oral every 4 hours PRN      REVIEW OF SYSTEMS- unable to obtain    --------------------------------------------------------------------------------      All other systems were reviewed and are negative, except as noted.    VITALS/PHYSICAL EXAM  --------------------------------------------------------------------------------  T(C): 37.6 (12-29-24 @ 07:30), Max: 37.6 (12-29-24 @ 07:30)  HR: 98 (12-29-24 @ 08:30) (82 - 118)  BP: --  RR: 23 (12-29-24 @ 08:30) (14 - 26)  SpO2: 100% (12-29-24 @ 08:30) (93% - 100%)  Wt(kg): --        12-28-24 @ 07:01  -  12-29-24 @ 07:00  --------------------------------------------------------  IN: 3603.4 mL / OUT: 3660 mL / NET: -56.6 mL    12-29-24 @ 07:01  -  12-29-24 @ 08:59  --------------------------------------------------------  IN: 107.6 mL / OUT: 199 mL / NET: -91.4 mL      Physical Exam:  	Gen:  critically ill   	HEENT: PERRL, supple neck, clear oropharynx  	Pulm: CTA B/L  	CV: RRR, S1S2; no rub  	Abd: +BS, soft, nontender/nondistended                      Transplant: No tenderness, swelling  	: No suprapubic tenderness  	UE: Warm, FROM; no edema; no asterixis  	LE: Warm, FROM; no edema  	Skin: Warm, without rashes      LABS/STUDIES  --------------------------------------------------------------------------------              8.1    7.45  >-----------<  21       [12-29-24 @ 06:55]              22.7     137  |  105  |  20  ----------------------------<  193      [12-29-24 @ 06:55]  4.1   |  21  |  0.40        Ca     7.4     [12-29-24 @ 06:55]      Mg     2.3     [12-29-24 @ 06:55]      Phos  3.8     [12-29-24 @ 06:55]    TPro  3.6  /  Alb  2.6  /  TBili  14.8  /  DBili  x   /  AST  250  /  ALT  205  /  AlkPhos  148  [12-29-24 @ 06:55]    PT/INR: PT 18.3 , INR 1.60       [12-29-24 @ 06:55]  PTT: 52.4       [12-29-24 @ 06:55]      Creatinine Trend:  SCr 0.40 [12-29 @ 06:55]  SCr 0.36 [12-29 @ 01:25]  SCr 0.40 [12-28 @ 18:17]  SCr 0.37 [12-28 @ 12:29]  SCr 0.38 [12-28 @ 06:19]            Urinalysis - [12-29-24 @ 06:55]      Color  / Appearance  / SG  / pH       Gluc 193 / Ketone   / Bili  / Urobili        Blood  / Protein  / Leuk Est  / Nitrite       RBC  / WBC  / Hyaline  / Gran  / Sq Epi  / Non Sq Epi  / Bacteria       Vitamin D (25OH) <6.0      [11-21-24 @ 08:32]  TSH 0.68      [12-12-24 @ 12:27]  Lipid: chol 114, , HDL 47, LDL --      [04-24-24 @ 06:48]

## 2024-12-29 NOTE — PROGRESS NOTE ADULT - ASSESSMENT
ASSESSMENT: 74 male w/ a PMHx of HTN, DM, AF, BPH, MASH cirrhosis and HCC s/p OLT on 11/15. Case was uneventful but post-op course has been prolonged and c/b delirium, aspiration, multiple episodes of acute hypoxic respiratory failure requiring reintubation from 11/20-11/24 & 11/24-11/26, AF w/ RVR, ileus requiring NGT, distended colon requiring rectal tube, dysphagia, malnutrition, hypernatremia, fevers, pancytopenia, poorly controlled glucoses, and left brachial VTE. Patient went into severe refractory septic shock on 12/6 w/ blood cultures positive for E. coli s/p s/p ex lap, total abd colectomy, end ileostomy, 3 intentionally retained laparotomy pads for bleeding, Abthera, IABP placement w/ admission to CTICU, Patient required inotropes, Jacqui, and CRRT post-op. s/p closure 12/9. IABP removed 12/10 and transferred back to SICU 12/13. SICU course c/b narrow complex arrythmia w/ ECG showing new ST elevations concerning for posterior wall MI vs vasospasms, cards consulted and started on heparin gtt for empiric ACS tx which was c/b GIB then transitioned to argatroban c/b bleed. TTE revealed LVOT obstruction & TODD.       PLAN:  Neuro:  - Pain: PRN oxy solution  - Opens eyes intermittently, Not following commands, off sedation  - CT head 11/19, 11/21, 11/25, 11/29, 12/5 negative  - EEG: Mild diffuse cerebral dysfunction that is not specific in etiology. No epileptic discharges recorded. No seizures recorded.  - Holding home xanax, Abilify, Zoloft, Remeron, Lexapro  - CT head 12/20 showing age-indeterminate infarct, f/u Neurology recs  - No need for MRI     Resp:  - S/p bedside tracheostomy 12/17  - PRVC 14/480/5/30  - SBT daily, full support overnight  - c/w inhaled bronchodilator    CV:  - Pressors: vaso, kassandra, off levo   - s/p dobutamine and amiodarone gtt   - home metoprolol 12.5 q8hr held for pressor req  - IABP removed 12/10  - TTE 12/6 w/ LVOT obstruction & TODD  - TTE 12/11 shows EF of 55-60%    GI:  - trend LFTs  - NPO, NGT (can be to gravity tube feeds or to gravity, no suction)   - protonix gtt  - monitor ALYSSA output    /Renal:  - CRRT restarted 12/23  - Monitor BUN/Cr, I&Os, trend UOP  - Scrotal US 12/15 -> edema, no abscess -> elevate  - repeat scrotal doppler for concern of ischemia > low flow state, low concern for abscess  - Albumin 5% 250 cc q8hr for 3 doses  - Bladder scan q12    Heme:  - trend H/H, PLTs, coags  - hep gtt and argatroban gtt held i/s/o bleed  - s/p desmopression 30 x 1 on 12/26    ID:  - ABX: jeniffer, caspo, bactrim, minocycline, linezolid, cyclosporine  - Tracheal aspirate -> Stenotrophomas maltophilia  - UCx 12/16 positive, Combi cath 12/19 positive  - CMV PPx w/ ganciclovir (holding now iso thrombocytopenia)  - holding cellcept  - Mouthwash for mouth ulcers  - Bacitracin for scabs    Endo:  - monitor glucose   - methylprednisone 32 qd  - ISS    Lines:   - NGT   - ALYSSA x 2   - BLANCO CAMACHO CVC

## 2024-12-29 NOTE — PROGRESS NOTE ADULT - SUBJECTIVE AND OBJECTIVE BOX
Transplant Surgery - Multidisciplinary Rounds  --------------------------------------------------------------  OLT   11/15/2024        POD#44  Colectomy 12/7/2024   POD#23  IABP 12/7 removed 12/10  Tracheostomy 12/17/2024    Present:   Patient seen and examined with multidisciplinary Transplant team including Surgeon: Dr. James, Dr. Sorto, JAZZ Caraballo, Hepatologist: Dr. Conteh and ICU team in AM rounds.   Disciplines not in attendance will be notified of the plan.     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     s/p OLT 11/15 with post op course c/b:  Hypoxia: Reintubated 11/20, extubated 11/24->reintubated 11/24, extubated 11/26, reintubated 12/7, trached 12/17  Ileus   A fib  AMS/Seizure   L brachial DVT  Neutropenia  E coli Bacteremia (12/6)  s/p Coloctomy 12/7.   IABP 12/7, removed 12/10  Ec Faecalis UTI (12/16)  Stenotrophamonas in trach aspirate (12/19)  UGIB 12/26    Interval/Overnight Events:                   Immunosuppression:  - Cyclo per level, MMF HELD, Solu 32  -ongoing monitoring for signs of rejection                          ROS: Unable to assess patient is lethargic, s/p tracheostomy    PHYSICAL EXAM:   Constitutional: trach to vent, lethargic  Eyes:  PERRLA  ENMT: nc/at, no thrush   Neck: supple   Respiratory: CTA B/L  Cardiovascular: RRR  Gastrointestinal: incision clean/dry/intact + Ostomy pink, wound vac, ALYSSA x2 SS fluid  Genitourinary: +scrotal edema w/ large fluid collection; black discoloration   Extremities: SCD's in place and working bilaterally, + LE edema  Neurological: trach to vent , sedated   Skin: no rashes, ulcerations, lesions  Transplant Surgery - Multidisciplinary Rounds  --------------------------------------------------------------  OLT   11/15/2024        POD#44  Colectomy 12/7/2024   POD#22  IABP 12/7 removed 12/10  Tracheostomy 12/17/2024    Present:   Patient seen and examined with multidisciplinary Transplant team including Surgeon: Dr. Vora, PA Clifton/Ale, Hepatologist: Dr. Conteh and bedside RN in AM rounds.   Disciplines not in attendance will be notified of the plan.     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     s/p OLT 11/15 with post op course c/b:  Hypoxia: Reintubated 11/20, extubated 11/24->reintubated 11/24, extubated 11/26, reintubated 12/7, trached 12/17  Ileus   A fib  AMS/Seizure   L brachial DVT  Neutropenia  E coli Bacteremia (12/6)  s/p Coloctomy 12/7.   IABP 12/7, removed 12/10  Ec Faecalis UTI (12/16)  Stenotrophamonas in trach aspirate (12/19)  UGIB 12/26    Interval/Overnight Events:   - Afebrile, on vasopressors and trach  - LFT's stable        Immunosuppression:  - Cyclo per level, MMF HELD, Solu 32  -ongoing monitoring for signs of rejection        MEDICATIONS  (STANDING):  buDESOnide    Inhalation Suspension 0.5 milliGRAM(s) Inhalation every 12 hours  caspofungin IVPB      caspofungin IVPB 50 milliGRAM(s) IV Intermittent every 24 hours  chlorhexidine 0.12% Liquid 15 milliLiter(s) Oral Mucosa every 12 hours  chlorhexidine 2% Cloths 1 Application(s) Topical <User Schedule>  CRRT Treatment    <Continuous>  insulin lispro (ADMELOG) corrective regimen sliding scale   SubCutaneous every 6 hours  linezolid  IVPB      linezolid  IVPB 600 milliGRAM(s) IV Intermittent every 12 hours  meropenem  IVPB 1000 milliGRAM(s) IV Intermittent every 12 hours  methylPREDNISolone sodium succinate Injectable 32 milliGRAM(s) IV Push <User Schedule>  minocycline IVPB      minocycline IVPB 200 milliGRAM(s) IV Intermittent every 12 hours  mupirocin 2% Ointment 1 Application(s) Topical every 12 hours  nystatin Powder 1 Application(s) Topical every 12 hours  pantoprazole Infusion 8 mG/Hr (10 mL/Hr) IV Continuous <Continuous>  phenylephrine    Infusion 1.5 MICROgram(s)/kG/Min (28 mL/Hr) IV Continuous <Continuous>  Phoxillum Filtration BK 4 / 2.5 5000 milliLiter(s) (1250 mL/Hr) CRRT <Continuous>  Phoxillum Filtration BK 4 / 2.5 5000 milliLiter(s) (250 mL/Hr) CRRT <Continuous>  PrismaSATE Dialysate BGK 4 / 2.5 5000 milliLiter(s) (1500 mL/Hr) CRRT <Continuous>  trimethoprim / sulfamethoxazole IVPB 160 milliGRAM(s) IV Intermittent daily  vasopressin Infusion 0.03 Unit(s)/Min (4.5 mL/Hr) IV Continuous <Continuous>    MEDICATIONS  (PRN):  albuterol/ipratropium for Nebulization 3 milliLiter(s) Nebulizer every 6 hours PRN Shortness of Breath and/or Wheezing  fentaNYL    Injectable 25 MICROGram(s) IV Push every 6 hours PRN Breakthrough Pain  FIRST- Mouthwash  BLM 10 milliLiter(s) Swish and Spit every 8 hours PRN Mouth Care  oxyCODONE    Solution 5 milliGRAM(s) Oral every 4 hours PRN Severe Pain (7 - 10)  oxyCODONE    Solution 2.5 milliGRAM(s) Oral every 4 hours PRN Moderate Pain (4 - 6)      PAST MEDICAL & SURGICAL HISTORY:  Diabetes      Transaminitis      Paroxysmal atrial fibrillation      Depression      BPH (benign prostatic hyperplasia)      Hypertension      Chronic atrial fibrillation      Coronary artery disease      Hepatocellular carcinoma      DM (diabetes mellitus)      HTN (hypertension)      Paroxysmal atrial fibrillation      Cirrhosis      HCC (hepatocellular carcinoma)      History of BPH      History of laparoscopic cholecystectomy      History of lumbar laminectomy      H/O prior ablation treatment      H/O percutaneous left heart catheterization          Vital Signs Last 24 Hrs  T(C): 37.6 (29 Dec 2024 07:30), Max: 37.6 (29 Dec 2024 07:30)  T(F): 99.7 (29 Dec 2024 07:30), Max: 99.7 (29 Dec 2024 07:30)  HR: 100 (29 Dec 2024 12:06) (82 - 105)  BP: --  BP(mean): --  RR: 24 (29 Dec 2024 12:06) (14 - 26)  SpO2: 100% (29 Dec 2024 12:06) (93% - 100%)    Parameters below as of 29 Dec 2024 12:06  Patient On (Oxygen Delivery Method): tracheostomy collar    O2 Concentration (%): 40    I&O's Summary    28 Dec 2024 07:01  -  29 Dec 2024 07:00  --------------------------------------------------------  IN: 3603.4 mL / OUT: 3660 mL / NET: -56.6 mL    29 Dec 2024 07:01  -  29 Dec 2024 12:57  --------------------------------------------------------  IN: 836.7 mL / OUT: 1450 mL / NET: -613.3 mL                              8.2    8.55  )-----------( 19       ( 29 Dec 2024 12:40 )             23.1     12-29    137  |  105  |  20  ----------------------------<  193[H]  4.1   |  21[L]  |  0.40[L]    Ca    7.4[L]      29 Dec 2024 06:55  Phos  3.8     12-29  Mg     2.3     12-29    TPro  3.6[L]  /  Alb  2.6[L]  /  TBili  14.8[H]  /  DBili  x   /  AST  250[H]  /  ALT  205[H]  /  AlkPhos  148[H]  12-29          Culture - Body Fluid with Gram Stain (collected 12-26-24 @ 19:36)  Source: Body Fluid  Gram Stain (12-28-24 @ 20:11):    polymorphonuclear leukocytes seen    No organisms seen    by cytocentrifuge  Preliminary Report (12-28-24 @ 20:11):    Rare Enterococcus faecalis    Culture - Body Fluid with Gram Stain (collected 12-26-24 @ 19:33)  Source: Body Fluid  Gram Stain (12-27-24 @ 07:00):    polymorphonuclear leukocytes seen    Gram positive cocci in pairs seen    by cytocentrifuge  Preliminary Report (12-28-24 @ 09:48):    Rare Enterococcus faecalis  Organism: Enterococcus faecalis (12-29-24 @ 07:55)  Organism: Enterococcus faecalis (12-29-24 @ 07:55)    Culture - Blood (collected 12-24-24 @ 03:30)  Source: .Blood BLOOD  Final Report (12-29-24 @ 07:00):    No growth at 5 days    Culture - Bronchial (collected 12-23-24 @ 16:37)  Source: Combi-Cath  Gram Stain (12-23-24 @ 22:50):    No polymorphonuclear cells seen per low power field    No squamous epithelial cells per low power field    No organisms seen per oil power field  Final Report (12-25-24 @ 08:06):    Commensal charity consistent with body site    Culture - Blood (collected 12-23-24 @ 16:03)  Source: .Blood BLOOD  Gram Stain (12-28-24 @ 12:55):    Growth in anaerobic bottle: Gram Positive Rods  Final Report (12-29-24 @ 11:10):    Growth in anaerobic bottle: Clostridium paraputrificum "Susceptibilities    not performed"    Direct identification is available within approximately 3-5    hours either by Blood Panel Multiplexed PCR or Direct    MALDI-TOF. Details: [email or web address]  Organism: Blood Culture PCR (12-29-24 @ 11:10)  Organism: Blood Culture PCR (12-29-24 @ 11:10)          ROS: Unable to assess patient is lethargic, s/p tracheostomy    PHYSICAL EXAM:   Constitutional: trach to vent, awake  Eyes:  PERRLA  ENMT: nc/at, no thrush   Neck: supple, trach   Respiratory: CTA B/L  Cardiovascular: RRR  Gastrointestinal: incision clean/dry/intact + Ostomy pink output in bag, wound vac, ALYSSA x2 SS fluid  Genitourinary: +scrotal edema w/ large fluid collection; black discoloration   Extremities: SCD's in place and working bilaterally, + LE edema  Neurological: trach to vent , sedated   Skin: no rashes, ulcerations, lesions

## 2024-12-29 NOTE — PROGRESS NOTE ADULT - ASSESSMENT
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 Post op course complicated by acute hypoxic respiratory failure requiring intubation multiple times, most recently on 12/7 with conversion to tracheostomy on 12/17, e.coli bacteremia with RTOR on 12/7 for concern for worsening septic shock and cardiogenic shock s/p balloon pump placement and total abdominal colectomy in setting of ischemic bowel s/p OR on 12/9 for ileostomy creation, and olirugirc MORAIMA requiring CRRT and now intermittent HD.    Diagnosed with pneumonia secondary to Stenotrophomonas    Also with growth of Enterococcus faecalis from abdominal drains and urine culture    More fever and shock event on 12/23/24    UA (12/19) 2 WBC  BCx (12/23) NGTD  Bronch Cx (12/23) NGTD    CT Chest (12/23) Bibasilar groundglass and tree in bud opacities which may represent distal airway impaction versus pneumonia.    CT A/P (12/23) Peripheral wedge-shaped areas of hypoattenuation involving the superior aspect of the spleen, suggestive of splenic infarcts (12-59). Mildly dilated loops of proximal small bowel without transition point, likely ileus.    Testicular US (12/23) No appreciable arterial flow with very limited venous flow in in the testes bilaterally. Interval decrease in diffuse scrotal edema. Small bilateral hydroceles.    CT Pelvis (12/25) Moderate diffuse subcutaneous/scrotal edema without defined collection or subcutaneous air.    Shock state overnight in context of likely GIB    Antibiotic Course:  Meropenem ( 11/22-11/29, 11/22-11/29, 12/16-)   Minocycline (12/21-)  Bactrim (11/16-11/24, 12/8-12/11, 12/21-)  Fluconazole (11/16-12/2, 12/12-12/16)   Caspofungin ( 12/6-12/11, 12/17-)  Cefepime (12/10-12/16)   Metronidazole (12/10-12/14)   Ganciclovir (12/7-12/13)   Linezolid (11/23-11/26, 12/6-12/11, 12/28-12/29)   Atovaquone (11/29-12/6)   Zosyn (11/20-11/22,12/6)   Tobramycin (12/6)   Valganciclovir (11/6-12/4)    #Positive Blood Culture (12/23 Clostridium paraputrificum)  Clostridium paraputrificum is generally penicillin and metronidazole susceptible  Meropenem should have conferred adequate coverage  --Stop Linezolid  --Out of overabundance of caution will add Metronidazole 500 mg IV Q8H    #Leukocytosis, Fever, Shock, Transaminitis, Stenotrophomonas Pneumonia  --If further worsening shock overnight would repeat blood cultures and administer Tobramycin 600 mg IV x1 (7 mg/kg based on adjusted body weight).   --Recommend repeat CT A/P (to reevaluate intraabdominal collections and testicular contents)  --Continue Minocycline 200 mg IV Q12H (12/21 >) Would treat through 12/30 to complete 10 day course.   --Continue Meropenem 1g IV Q12H  --Doubt need for Caspofungin; no growth of fungal organisms    #Liver Transplant Recipient, Prophylactic Antibiotic  --Repeat CMV PCR on 12/30  --Given thrombocytopenia can consider holding Bactrim or now    Dr. Wes Carrero will be covering the patient starting from tomorrow. Please reach out to her for further questions and follow up.     Kyle Junior M.D.  [facility] Division of Infectious Disease  8AM-5PM Monday - Friday: Available on [employer] Teams  After Hours and Holidays (or if no response on [employer] Teams): Please contact the Infectious Diseases Office at [phone]     The above assessment and plan were discussed with Dr Vora, transplant surgery attending

## 2024-12-29 NOTE — PROGRESS NOTE ADULT - ASSESSMENT
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   s/p OLT 11/15 with complicated post op course    [] Septic shock/Cardiogenic shock  [] s/p Colectomy 12/7   [] RTOR for closure and Ileostomy creation   [] IAPB 12/7- removed 12/10  - E coli Bacteremia (12/6)  Received Tobra x 1 12/6 .   - EC faecalis asc fluid ( 12/20) (12/26)  - Ec faecalis UTI (12/16)  - Tx ID following - Christopher/Caspo/Bactrim/Minocycline/added Linezolid  - Amikacin x1 12/26  - Vanco x1 12/26, 12/28  - Neutropenia: received Neupogen 480mcg x2  - MORAIMA: CRRT started 12/7, stopped 12/15, resumed CRRT 12/23  - AMS; CT Head neg  - Hold diuretics   - 12/23 BCX: GPR  - repeat cultures today  - 12/23 CT chest/Abd/pelvis: GGO, splenic infarcts, ileus  - S/P tracheotomy 12/17  - UGIB s/p EGD showing generalized oozing, coagulopathy       - given 2u FFP, 2u cryo, 2u PLT (12/26)       - recieved 2uFFP, 2u cryo, 2u PLT, 3 uPRBC 12/27       - Protonix ggt       - TF at goal    [] s/p OLT POD #44  - LFT's downtrending from 12/25, Liver US: tortuous common hepatic artery with elevated peak systolic velocities up to 635 cm/s, not noted on prior study. growing perihepatic fluid collection  - repeat liver DUS unchanged  - Diet: increase TFs as needed  - Pain management: avoid narcotics  - Strict I&Os, nichols, wound vac placed 12/10   - US: L brachial DVT   - wound vac exchange for ileostomy (12/13)  - HIT panel neg (12/14)      [ ] Immunosuppression  - Tacrolimus stopped 11/18 in setting of AMS/Seizure, Started Cyclo 12/17  - Cyclo restarted 25mg QD, MMF HELD, Solumedrol 32 mg daily  - PPx: Gancyclovir (HELD) in setting of thrombocytopenia; repeat CMV PCR pending     [] lleus   -@ home on Linzess  - imaging with distended colon (likely opioid induced)  - s/p Neostigmine x 2  - s/p Relistor, last dose 12/1  - s/p colectomy with end ileostomy  (see above)  - ileostomy with >1L output, consider adding bulking agents  - Daily AXR     [] CMV viremia  - d/c gancylovir due to thrombocytopenia  - CMV PCR (12/11 and 12/16): neg, positive CMVPCR on 12/23 repeat CMV pending from 12/24    [ ] AMS  - Reintubated 11/20 Aspiration/hypoxia, extubated 11/24->[id number]/24--> extubated 11/26 -> [id number]/7 -> tracheostomy 12/17  - EEG 11/30 negative, Neurology following  - BH following   - off tacro  - on keppra  -CT Head No Cont (12/20): Age-indeterminate posterior left frontal lobe infarct.   -CT Head (12/25): Evolving subacute infarct in the left supramarginal gyrus  - SICU stated MRI not needed     [ ] HTN/ pAFib  [ ] coronary vasospasm vs posterior wall MI  - EKG 12/24 c/f ST depression, rising troponins: 1800s  - d/c argatroban and aspirin due to bleeding  - Heparin gtt 12/19-21  - Cards- plan for PCI prior to DC   - Off Amiodarone, off dobutamine  - Off metoprolol for soft bp.  - Dr. 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   s/p OLT 11/15 with complicated post op course    [] Septic shock/Cardiogenic shock  [] s/p Colectomy 12/7   [] RTOR for closure and Ileostomy creation   [] IAPB 12/7- removed 12/10  - E coli Bacteremia (12/6)  Received Tobra x 1 12/6 .   - EC faecalis asc fluid ( 12/20) (12/26)  - Ec faecalis UTI (12/16)  - Tx ID following - Christopher/Caspo/Bactrim/Minocycline/added Linezolid  - Amikacin x1 12/26  - Vanco x1 12/26, 12/28 -> Linezolid added  - Neutropenia: received Neupogen 480mcg x2  - MORAIMA: CRRT started 12/7, stopped 12/15, resumed CRRT 12/23  - AMS; CT Head neg  - Hold diuretics   - 12/23 BCX: GPR  - repeat cultures today  - 12/23 CT chest/Abd/pelvis: GGO, splenic infarcts, ileus  - S/P tracheotomy 12/17  - UGIB s/p EGD showing generalized oozing, coagulopathy       - given 2u FFP, 2u cryo, 2u PLT (12/26)       - recieved 2uFFP, 2u cryo, 2u PLT, 3 uPRBC 12/27       - Protonix ggt       - TF at goal    [] s/p OLT POD #44  - LFT's downtrending from 12/25, Liver US: tortuous common hepatic artery with elevated peak systolic velocities up to 635 cm/s, not noted on prior study. growing perihepatic fluid collection  - repeat liver DUS unchanged  - Diet: increase TFs as needed  - Pain management: avoid narcotics  - Strict I&Os, nichols, wound vac placed 12/10   - US: L brachial DVT (holding daap)  - wound vac exchange for ileostomy (12/13)  - HIT panel neg (12/14)      [ ] Immunosuppression  - Tacrolimus stopped 11/18 in setting of AMS/Seizure, Started Cyclo 12/17  - Cyclo restarted 25mg QD, MMF HELD, Solumedrol 32 mg daily  - PPx: Gancyclovir (HELD) in setting of thrombocytopenia; repeat CMV PCR pending     [] lleus   -@ home on Linzess  - imaging with distended colon (likely opioid induced)  - s/p Neostigmine x 2  - s/p Relistor, last dose 12/1  - s/p colectomy with end ileostomy  (see above)  - ileostomy with >1L output, consider adding bulking agents  - Daily AXR     [] CMV viremia  - d/c gancylovir due to thrombocytopenia  - CMV PCR (12/11 and 12/16): neg, positive CMVPCR on 12/23 repeat CMV pending from 12/24    [ ] AMS  - Reintubated 11/20 Aspiration/hypoxia, extubated 11/24->ryprjaknceg04/24--> extubated 11/26 -> advswsumnuw46/7 -> tracheostomy 12/17 -> trach collar trials starting 12/29  - EEG 11/30 negative, Neurology following  - BH following   - off tacro  - on keppra  -CT Head No Cont (12/20): Age-indeterminate posterior left frontal lobe infarct.   -CT Head (12/25): Evolving subacute infarct in the left supramarginal gyrus  - SICU stated MRI not needed     [ ] HTN/ pAFib  [ ] coronary vasospasm vs posterior wall MI  - EKG 12/24 c/f ST depression, rising troponins: 1800s  - d/c argatroban and aspirin due to bleeding  - Heparin gtt 12/19-21  - Cards- plan for PCI prior to DC   - Off Amiodarone, off dobutamine  - Off metoprolol for soft bp.  - Dr. Quintanilla following    [ ] DM  - ISS     []Scrotal abscess   - US (12/12): 2.1 x0.9 x 3.2 cm focal, right testicle- possible abscess (12/12)  - Urology recs: CT scrotum review no debridement required  - Urology recs Derm consult for guidance on wound care   - 12/23 US doppler scrotum: no arterial flow, limited venous flow, bl hydrocele  - 12/15 CT CAP no acute changes   - Elevate scrotum w/ support Urology signed off 12/29

## 2024-12-29 NOTE — PROGRESS NOTE ADULT - SUBJECTIVE AND OBJECTIVE BOX
SICU Daily Progress Note  =====================================================  Interval/Overnight Events:     - Repeat blood cultures x2  - Cyclosporine added  - 1g vancomycin -> vanc d/c'ed, linezolid added for E faecalis in body fluid cx 12/26  - Fentanyl patch d/c'ed, added fentanyl IV prn  - Bacitracin for scabs  - Mouthwash for mouth ulcers  - TF to goal  - SBP goal to 100 from 110  - 1u prbc, 1u platelets  - Trach collar during the day      Allergies: No Known Allergies      MEDICATIONS:   --------------------------------------------------------------------------------------  Neurologic Medications  fentaNYL    Injectable 25 MICROGram(s) IV Push every 6 hours PRN Breakthrough Pain  oxyCODONE    Solution 5 milliGRAM(s) Oral every 4 hours PRN Severe Pain (7 - 10)  oxyCODONE    Solution 2.5 milliGRAM(s) Oral every 4 hours PRN Moderate Pain (4 - 6)    Respiratory Medications  albuterol/ipratropium for Nebulization 3 milliLiter(s) Nebulizer every 6 hours PRN Shortness of Breath and/or Wheezing  buDESOnide    Inhalation Suspension 0.5 milliGRAM(s) Inhalation every 12 hours    Cardiovascular Medications  phenylephrine    Infusion 1.5 MICROgram(s)/kG/Min IV Continuous <Continuous>    Gastrointestinal Medications  pantoprazole Infusion 8 mG/Hr IV Continuous <Continuous>    Genitourinary Medications    Hematologic/Oncologic Medications  cycloSPORINE  , modified (GENGRAF) 25 milliGRAM(s) Oral <User Schedule>    Antimicrobial/Immunologic Medications  caspofungin IVPB      caspofungin IVPB 50 milliGRAM(s) IV Intermittent every 24 hours  linezolid  IVPB 600 milliGRAM(s) IV Intermittent every 12 hours  linezolid  IVPB      meropenem  IVPB 1000 milliGRAM(s) IV Intermittent every 12 hours  minocycline IVPB      minocycline IVPB 200 milliGRAM(s) IV Intermittent every 12 hours  trimethoprim / sulfamethoxazole IVPB 160 milliGRAM(s) IV Intermittent daily    Endocrine/Metabolic Medications  insulin lispro (ADMELOG) corrective regimen sliding scale   SubCutaneous every 6 hours  methylPREDNISolone sodium succinate Injectable 32 milliGRAM(s) IV Push <User Schedule>  vasopressin Infusion 0.03 Unit(s)/Min IV Continuous <Continuous>    Topical/Other Medications  chlorhexidine 0.12% Liquid 15 milliLiter(s) Oral Mucosa every 12 hours  chlorhexidine 2% Cloths 1 Application(s) Topical <User Schedule>  FIRST- Mouthwash  BLM 10 milliLiter(s) Swish and Spit every 8 hours PRN Mouth Care  mupirocin 2% Ointment 1 Application(s) Topical every 12 hours  nystatin Powder 1 Application(s) Topical every 12 hours    --------------------------------------------------------------------------------------    VITAL SIGNS, INS/OUTS (last 24 hours):  --------------------------------------------------------------------------------------  T(C): 36.8 (12-28-24 @ 15:00), Max: 37.1 (12-28-24 @ 07:00)  HR: 92 (12-29-24 @ 00:30) (82 - 118)  BP: --  RR: 20 (12-29-24 @ 00:30) (14 - 77)  SpO2: 100% (12-29-24 @ 00:30) (93% - 100%)    12-27-24 @ 07:01  -  12-28-24 @ 07:00  --------------------------------------------------------  IN: 3950.2 mL / OUT: 4594 mL / NET: -643.8 mL    12-28-24 @ 07:01  -  12-29-24 @ 01:40  --------------------------------------------------------  IN: 3131.3 mL / OUT: 2271 mL / NET: 860.3 mL      --------------------------------------------------------------------------------------    EXAM  NEUROLOGY  Exam: Following commands    RESPIRATORY  Exam: Trach collar, on full vent support    CARDIOVASCULAR  Exam: Regular rate and rhythm    GI/NUTRITION  Exam: Abdomen soft, non-distended      LABS  --------------------------------------------------------------------------------------                        8.1    8.08  )-----------( 29       ( 28 Dec 2024 18:16 )             23.2   12-28    137  |  107  |  19  ----------------------------<  169[H]  3.9   |  21[L]  |  0.40[L]    Ca    7.8[L]      28 Dec 2024 18:17  Phos  2.5     12-28  Mg     2.3     12-28    TPro  3.9[L]  /  Alb  2.9[L]  /  TBili  13.8[H]  /  DBili  x   /  AST  308[H]  /  ALT  221[H]  /  AlkPhos  139[H]  12-28  ABG - ( 28 Dec 2024 18:10 )  pH, Arterial: 7.45  pH, Blood: x     /  pCO2: 33    /  pO2: 131   / HCO3: 23    / Base Excess: -0.8  /  SaO2: 99.1            Urinalysis Basic - ( 28 Dec 2024 18:17 )    Color: x / Appearance: x / SG: x / pH: x  Gluc: 169 mg/dL / Ketone: x  / Bili: x / Urobili: x   Blood: x / Protein: x / Nitrite: x   Leuk Esterase: x / RBC: x / WBC x   Sq Epi: x / Non Sq Epi: x / Bacteria: x    PT/INR - ( 28 Dec 2024 18:16 )   PT: 17.6 sec;   INR: 1.55 ratio         PTT - ( 28 Dec 2024 18:16 )  PTT:48.5 sec  --------------------------------------------------------------------------------------

## 2024-12-29 NOTE — PROGRESS NOTE ADULT - SUBJECTIVE AND OBJECTIVE BOX
HPI  No acute urologic events overnight.     PAST MEDICAL & SURGICAL HISTORY:  Diabetes      Transaminitis      Paroxysmal atrial fibrillation      Depression      BPH (benign prostatic hyperplasia)      Hypertension      Chronic atrial fibrillation      Coronary artery disease      Hepatocellular carcinoma      DM (diabetes mellitus)      HTN (hypertension)      Paroxysmal atrial fibrillation      Cirrhosis      HCC (hepatocellular carcinoma)      History of BPH      History of laparoscopic cholecystectomy      History of lumbar laminectomy      H/O prior ablation treatment      H/O percutaneous left heart catheterization          MEDICATIONS  (STANDING):  buDESOnide    Inhalation Suspension 0.5 milliGRAM(s) Inhalation every 12 hours  caspofungin IVPB 50 milliGRAM(s) IV Intermittent every 24 hours  caspofungin IVPB      chlorhexidine 0.12% Liquid 15 milliLiter(s) Oral Mucosa every 12 hours  chlorhexidine 2% Cloths 1 Application(s) Topical <User Schedule>  CRRT Treatment    <Continuous>  insulin lispro (ADMELOG) corrective regimen sliding scale   SubCutaneous every 6 hours  linezolid  IVPB      linezolid  IVPB 600 milliGRAM(s) IV Intermittent every 12 hours  meropenem  IVPB 1000 milliGRAM(s) IV Intermittent every 12 hours  methylPREDNISolone sodium succinate Injectable 32 milliGRAM(s) IV Push <User Schedule>  minocycline IVPB      minocycline IVPB 200 milliGRAM(s) IV Intermittent every 12 hours  mupirocin 2% Ointment 1 Application(s) Topical every 12 hours  nystatin Powder 1 Application(s) Topical every 12 hours  pantoprazole Infusion 8 mG/Hr (10 mL/Hr) IV Continuous <Continuous>  phenylephrine    Infusion 1.5 MICROgram(s)/kG/Min (28 mL/Hr) IV Continuous <Continuous>  Phoxillum Filtration BK 4 / 2.5 5000 milliLiter(s) (1250 mL/Hr) CRRT <Continuous>  Phoxillum Filtration BK 4 / 2.5 5000 milliLiter(s) (250 mL/Hr) CRRT <Continuous>  PrismaSATE Dialysate BGK 4 / 2.5 5000 milliLiter(s) (1500 mL/Hr) CRRT <Continuous>  trimethoprim / sulfamethoxazole IVPB 160 milliGRAM(s) IV Intermittent daily  vasopressin Infusion 0.03 Unit(s)/Min (4.5 mL/Hr) IV Continuous <Continuous>    MEDICATIONS  (PRN):  albuterol/ipratropium for Nebulization 3 milliLiter(s) Nebulizer every 6 hours PRN Shortness of Breath and/or Wheezing  fentaNYL    Injectable 25 MICROGram(s) IV Push every 6 hours PRN Breakthrough Pain  FIRST- Mouthwash  BLM 10 milliLiter(s) Swish and Spit every 8 hours PRN Mouth Care  oxyCODONE    Solution 5 milliGRAM(s) Oral every 4 hours PRN Severe Pain (7 - 10)  oxyCODONE    Solution 2.5 milliGRAM(s) Oral every 4 hours PRN Moderate Pain (4 - 6)      FAMILY HISTORY:  Family history of coronary artery disease (Father, Sibling, Sibling)    Family history of diabetes mellitus (Father, Mother)    Family history of coronary artery disease (Sibling)        Allergies    No Known Allergies    Intolerances        SOCIAL HISTORY:    REVIEW OF SYSTEMS: Otherwise negative as stated in HPI    Physical Exam  Vital signs  T(C): 37.6 (12-29-24 @ 07:30), Max: 37.6 (12-29-24 @ 07:30)  HR: 99 (12-29-24 @ 10:00)  BP: --  SpO2: 100% (12-29-24 @ 10:00)  Wt(kg): --    Output    OUT:    Bulb (mL): 190 mL    Bulb (mL): 200 mL    Other (mL): 700 mL    Other (mL): 1620 mL    VAC (Vacuum Assisted Closure) System (mL): 0 mL  Total OUT: 2710 mL    Total NET: -2710 mL      OUT:    Bulb (mL): 200 mL    Bulb (mL): 200 mL    Other (mL): 302 mL  Total OUT: 702 mL    Total NET: -702 mL          Gen: NAD  Pulm: No respiratory distress	  CV: RRR  GI: S/ND/NT  : Scrotal skin w/ brown/green discoloration grossly. Scabbing on prepuce dorsally. No evidence of Dipika, w/o crepitus, or induration.      MSK: moves all 4 limbs spontaneously    LABS:      12-29 @ 06:55    WBC 7.45  / Hct 22.7  / SCr 0.40     12-29 @ 01:25    WBC 7.78  / Hct 23.3  / SCr 0.36     12-29    137  |  105  |  20  ----------------------------<  193[H]  4.1   |  21[L]  |  0.40[L]    Ca    7.4[L]      29 Dec 2024 06:55  Phos  3.8     12-29  Mg     2.3     12-29    TPro  3.6[L]  /  Alb  2.6[L]  /  TBili  14.8[H]  /  DBili  x   /  AST  250[H]  /  ALT  205[H]  /  AlkPhos  148[H]  12-29    PT/INR - ( 29 Dec 2024 06:55 )   PT: 18.3 sec;   INR: 1.60 ratio         PTT - ( 29 Dec 2024 06:55 )  PTT:52.4 sec  Urinalysis Basic - ( 29 Dec 2024 06:55 )    Color: x / Appearance: x / SG: x / pH: x  Gluc: 193 mg/dL / Ketone: x  / Bili: x / Urobili: x   Blood: x / Protein: x / Nitrite: x   Leuk Esterase: x / RBC: x / WBC x   Sq Epi: x / Non Sq Epi: x / Bacteria: x

## 2024-12-29 NOTE — PROGRESS NOTE ADULT - ASSESSMENT
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 Post-op course c/b worsening mental status and acute hypoxic respiratory failure requiring multiple intubations/extubations, no longer intubated, now s/p tracheostomy (as of 12/23/2024) and cardiogenic shock s/p Percutaneous insertion of an intra-aortic balloon pump and septic shock/colonic ischemia s/p total abdominal colectomy with ileostomy on 12/7/24, s/p ex-lap w/ileostomy on 12/9/24. Urology initially consulted 12/9-12/16 for scrotal edema w/ large skin breakdown. Scrotum at that time was larger, more red edematous, swollen with skin breakdown, no crepitus felt. Scrotal/testicular ultrasounds were performed at that time, all showing scrotal edema, no necrotizing infection/abscess. Urology signed off, recommending scrotal support, wound care, no surgical intervention indicated at that time.     Urology was recalled today 12/23 for worsening of scrotal skin sloughing. Per primary team, edema and scrotal swelling has improved. However, there is a layer of brown film overlaying the ventral area of the scrotum encompassing both the ventral layer of skin of the left and right scrotum. The brown film sloughs and bleed superficially when irritated, it is cold to the touch. There is an area of similar film overlaying a section of the right inner thigh. There is clear delineation from the brown layer of film and normal skin-colored scrotum. The normal colored scrotal skin is warm to touch.  No appreciable flow to testicles shown on ultrasound likely also 2/2 to prolonged pressor requirements decreasing blood flow to testicles and should resolve with improved peripheral perfusion. On examination testicles in normal lie and are soft, no role for orchiopexy for testicles.    12/28/24  Scrotum with no evidence of gangrene, non-indurated, non erythematous. Dark olive appearance suggestive of ischemia possible d/t dependent position and pressors.    Recs  - Pending discussion w/ Dr. 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 Post-op course c/b worsening mental status and acute hypoxic respiratory failure requiring multiple intubations/extubations, no longer intubated, now s/p tracheostomy (as of 12/23/2024) and cardiogenic shock s/p Percutaneous insertion of an intra-aortic balloon pump and septic shock/colonic ischemia s/p total abdominal colectomy with ileostomy on 12/7/24, s/p ex-lap w/ileostomy on 12/9/24. Urology initially consulted 12/9-12/16 for scrotal edema w/ large skin breakdown. Scrotum at that time was larger, more red edematous, swollen with skin breakdown, no crepitus felt. Scrotal/testicular ultrasounds were performed at that time, all showing scrotal edema, no necrotizing infection/abscess. Urology signed off, recommending scrotal support, wound care, no surgical intervention indicated at that time.     Urology was recalled today 12/23 for worsening of scrotal skin sloughing. Per primary team, edema and scrotal swelling has improved. However, there is a layer of brown film overlaying the ventral area of the scrotum encompassing both the ventral layer of skin of the left and right scrotum. The brown film sloughs and bleed superficially when irritated, it is cold to the touch. There is an area of similar film overlaying a section of the right inner thigh. There is clear delineation from the brown layer of film and normal skin-colored scrotum. The normal colored scrotal skin is warm to touch.  No appreciable flow to testicles shown on ultrasound likely also 2/2 to prolonged pressor requirements decreasing blood flow to testicles and should resolve with improved peripheral perfusion. On examination testicles in normal lie and are soft, no role for orchiopexy for testicles.    12/28/24  Scrotum with no evidence of gangrene, non-indurated, non erythematous. Dark olive appearance suggestive of ischemia possible d/t dependent position and pressors.  12/29/24  Dorsal surface of scrotum appears unchanged.     Recs  -No acute intervention indicated at this time  -Scrotum w/ dependent skin changes, possibly exacerbated by pressor.  However, mucosa appears viable  -Continue to elevate scrotum, w/ support  -Urology to sign off at this time. Please reconsult with any new questions or concerns.     Plan discussed w/ Dr. Shahzad Steiner    [facility]  [address] [place] [id number]  [phone]

## 2024-12-29 NOTE — PROGRESS NOTE ADULT - NS ATTEND AMEND GEN_ALL_CORE FT
74M w/ PMHx of HTN, DM, AF, BPH, MASH cirrhosis and 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 Patient's post-op course has been c/b multiple reintubation, required total colectomy w/ ileostomy, and IABP placement 2/2 cardiogenic shock with subsequent removal.   S/P prolonged intubation, s/p trached and PEG    Periodically more awake and alert, follows, off Fent patch to prn Fent pushes  TC/CPAP trials, CXR no acute finding  Overall improving vasopressor requirement, remains off Levo with decreasing  Aldo and physiologic dose Vaso  Increase tube feed at goal, LFT and lactate decreased  PPI bid for UGI bleed  Standing colloid resuscitation for high ileostomy output.  Abx Christopher, Minocycline, Bactrim, Caspo, resp culture Stenotrophomonas, Peritoneal fluid has persistent E fecalis. BC X1 GPR on Linezolid, monitor thrombocytopenia  CRRT with net even, colloid and blood transfusion not counted    ISS/off Lantus  Mech DVT ppx, transfusion to keep Hb > 8, products based on TEG    On low dose cyclosporine today    Family kept updated  Bed side PT    Plan coordinated with transplant team 74M w/ PMHx of HTN, DM, AF, BPH, MASH cirrhosis and 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 Patient's post-op course has been c/b multiple reintubation, required total colectomy w/ ileostomy, and IABP placement 2/2 cardiogenic shock with subsequent removal.   S/P prolonged intubation, s/p trached and PEG    Periodically more awake and alert, follows, off Fent patch to prn Fent pushes  TC/CPAP trials, CXR no acute finding  Overall improving vasopressor requirement, remains off Levo with decreasing  Aldo and physiologic dose Vaso  Increase tube feed at goal, LFT and lactate decreased  PPI bid for UGI bleed  Standing colloid resuscitation for high ileostomy output.  Abx Christopher, Minocycline, Bactrim, Caspo, resp culture Stenotrophomonas, Peritoneal fluid has persistent E fecalis. BC X1 GPR on Linezolid, monitor thrombocytopenia  CRRT with net even, colloid and blood transfusion not counted  No intervention for scrotal finding  ISS/off Lantus  Mech DVT ppx, transfusion to keep Hb > 8, products based on TEG    On low dose cyclosporine today    Family kept updated  Bed side PT    Plan coordinated with transplant team

## 2024-12-29 NOTE — PROGRESS NOTE ADULT - SUBJECTIVE AND OBJECTIVE BOX
Follow Up:      Interval History:    REVIEW OF SYSTEMS  [  ] ROS unobtainable because:    [  ] All other systems negative except as noted below    Constitutional:  [ ] fever [ ] chills  [ ] weight loss  [ ] weakness  Skin:  [ ] rash [ ] phlebitis	  Eyes: [ ] icterus [ ] pain  [ ] discharge	  ENMT: [ ] sore throat  [ ] thrush [ ] ulcers [ ] exudates  Respiratory: [ ] dyspnea [ ] hemoptysis [ ] cough [ ] sputum	  Cardiovascular:  [ ] chest pain [ ] palpitations [ ] edema	  Gastrointestinal:  [ ] nausea [ ] vomiting [ ] diarrhea [ ] constipation [ ] pain	  Genitourinary:  [ ] dysuria [ ] frequency [ ] hematuria [ ] discharge [ ] flank pain  [ ] incontinence  Musculoskeletal:  [ ] myalgias [ ] arthralgias [ ] arthritis  [ ] back pain  Neurological:  [ ] headache [ ] seizures  [ ] confusion/altered mental status    Allergies  No Known Allergies        ANTIMICROBIALS:  caspofungin IVPB    caspofungin IVPB 50 every 24 hours  meropenem  IVPB 1000 every 12 hours  metroNIDAZOLE  IVPB 500 every 8 hours  minocycline IVPB    minocycline IVPB 200 every 12 hours  trimethoprim / sulfamethoxazole IVPB 160 daily      OTHER MEDS:  MEDICATIONS  (STANDING):  albuterol/ipratropium for Nebulization 3 every 6 hours PRN  buDESOnide    Inhalation Suspension 0.5 every 12 hours  fentaNYL    Injectable 25 every 6 hours PRN  insulin lispro (ADMELOG) corrective regimen sliding scale  every 6 hours  methylPREDNISolone sodium succinate Injectable 32 <User Schedule>  oxyCODONE    Solution 5 every 4 hours PRN  oxyCODONE    Solution 2.5 every 4 hours PRN  pantoprazole Infusion 8 <Continuous>  phenylephrine    Infusion 1.5 <Continuous>  vasopressin Infusion 0.03 <Continuous>      Vital Signs Last 24 Hrs  T(C): 37.5 (29 Dec 2024 15:00), Max: 38.2 (29 Dec 2024 12:00)  T(F): 99.5 (29 Dec 2024 15:00), Max: 100.8 (29 Dec 2024 12:00)  HR: 97 (29 Dec 2024 17:21) (82 - 105)  BP: --  BP(mean): --  RR: 25 (29 Dec 2024 17:15) (17 - 28)  SpO2: 100% (29 Dec 2024 17:21) (94% - 100%)    Parameters below as of 29 Dec 2024 17:21  Patient On (Oxygen Delivery Method): ventilator        PHYSICAL EXAMINATION:  General: Alert and Awake, NAD  HEENT: PERRL, EOMI  Neck: Supple  Cardiac: RRR, No M/R/G  Resp: CTAB, No Wh/Rh/Ra  Abdomen: NBS, NT/ND, No HSM, No rigidity or guarding  MSK: No LE edema. No Calf tenderness  : No nichols  Skin: No rashes or lesions. Skin is warm and dry to the touch.   Neuro: Alert and Awake. CN 2-12 Grossly intact. Moves all four extremities spontaneously.  Psych: Calm, Pleasant, Cooperative                          7.4    7.94  )-----------( 46       ( 29 Dec 2024 16:27 )             21.2       12-29    136  |  104  |  20  ----------------------------<  197[H]  4.0   |  21[L]  |  0.38[L]    Ca    7.3[L]      29 Dec 2024 12:40  Phos  3.2     12-29  Mg     2.3     12-29    TPro  3.6[L]  /  Alb  2.4[L]  /  TBili  15.8[H]  /  DBili  x   /  AST  233[H]  /  ALT  206[H]  /  AlkPhos  151[H]  12-29      Urinalysis Basic - ( 29 Dec 2024 12:40 )    Color: x / Appearance: x / SG: x / pH: x  Gluc: 197 mg/dL / Ketone: x  / Bili: x / Urobili: x   Blood: x / Protein: x / Nitrite: x   Leuk Esterase: x / RBC: x / WBC x   Sq Epi: x / Non Sq Epi: x / Bacteria: x        MICROBIOLOGY:  v  Body Fluid  12-26-24   Rare Enterococcus faecalis  --  Enterococcus faecalis      Body Fluid  12-26-24   Rare Enterococcus faecalis  --  Enterococcus faecalis      .Blood BLOOD  12-24-24   No growth at 5 days  --  --      Combi-Cath  12-23-24   Commensal charity consistent with body site  --    No polymorphonuclear cells seen per low power field  No squamous epithelial cells per low power field  No organisms seen per oil power field      .Blood BLOOD  12-23-24   Growth in anaerobic bottle: Clostridium paraputrificum "Susceptibilities  not performed"  Direct identification is available within approximately 3-5  hours either by Blood Panel Multiplexed PCR or Direct  MALDI-TOF. Details: [email or web address]  --  Blood Culture PCR      Abdominal Fl  12-20-24   Rare Enterococcus faecalis  --  Enterococcus faecalis      Trach Asp Tracheal Aspirate  12-19-24   Moderate Stenotrophomonas maltophilia  Commensal charity consistent with body site  --  Stenotrophomonas maltophilia      .Blood BLOOD  12-19-24   No growth at 5 days  --  --      Catheterized Catheterized  12-16-24   10,000 - 49,000 CFU/mL Enterococcus faecalis  --  Enterococcus faecalis      .Blood BLOOD  12-16-24   No growth at 5 days  --  --      Combi-Cath  12-16-24   Commensal charity consistent with body site  --    No polymorphonuclear leukocytes seen per low power field  No Squamous epithelial cells seen per low power field  No organisms seen per oil power field      .Blood BLOOD  12-16-24   No growth at 5 days  --  --      Combi-Cath  12-14-24   Commensal charity consistent with body site  --    Numerous polymorphonuclear leukocytes per low power field  No Squamous epithelial cells per low power field  No organisms seen per oil power field      .Other  12-12-24   No fungus isolated at 2 weeks.  --  --      .Blood BLOOD  12-07-24   No growth at 5 days  --  --      Body Fluid  12-07-24   No growth at 5 days  --    polymorphonuclear leukocytes seen  No organisms seen  by cytocentrifuge      .Blood BLOOD  12-06-24   Growth in aerobic and anaerobic bottles: Escherichia coli  See previous culture [id number]  --    Growth in aerobic bottle: Gram Negative Rods  Growth in anaerobic bottle: Gram Negative Rods      .Blood BLOOD  12-06-24   Growth in aerobic and anaerobic bottles: Escherichia coli  Direct identification is available within approximately 3-5  hours either by Blood Panel Multiplexed PCR or Direct  MALDI-TOF. Details: [email or web address]  --  Blood Culture PCR  Escherichia coli      .Blood BLOOD  12-05-24   No growth at 5 days  --  --      .Blood BLOOD  12-05-24   No growth at 5 days  --  --      .Stool  12-01-24   No enteric pathogens isolated.  (Stool culture examined for Salmonella,  Shigella, Campylobacter, Aeromonas, Plesiomonas,  Vibrio, E.coli O157 and Yersinia)  --  --        HIV-1 RNA Quantitative, Viral Load Log: NOT DET. lg /mL (12-18-24 @ 22:14)  Toxoplasma IgG Screen: 78.00 IU/mL (11-15-24 @ 00:41)  CMV IgG Antibody: 1.50 U/mL (11-15-24 @ 00:41)    CMVPCR Log: Det <1.54 Assay Dynamic Range: 34.5 to 1.0E+07 IU/mL (1.54 to 7.00 Log10 IU/mL)  Assay lower limit of quantification (LLOQ) is 34.5 IU/mL (1.54 Log10  IU/mL)  CMV DNA detected below the LLOQ will be reported as Detected < 34.5 IU/mL  (<1.54 Log10 IU/mL)  Nydia Cytomegalovirus (CMV) is an FDA-cleared quantitative test that  enables the detection and quantitation of CMV DNA in EDTA plasma of  infected transplant patients on the nydia 8800 system. This test was  verified by [employer]. Results should be interpreted  with consideration of all clinical findings and laboratory findings and  should not form the sole basis for a diagnosis or treatment decision. Sql16DV/mL (12-23 @ 06:15)        RADIOLOGY:    <The imaging below has been reviewed and visualized by me independently. Findings as detailed in report below> Follow Up:  shock    Interval History: ID of GPR resulted as clostridium spp. afebrile.     REVIEW OF SYSTEMS  [ x ] ROS unobtainable because:  trached  [  ] All other systems negative except as noted below    Constitutional:  [ ] fever [ ] chills  [ ] weight loss  [ ] weakness  Skin:  [ ] rash [ ] phlebitis	  Eyes: [ ] icterus [ ] pain  [ ] discharge	  ENMT: [ ] sore throat  [ ] thrush [ ] ulcers [ ] exudates  Respiratory: [ ] dyspnea [ ] hemoptysis [ ] cough [ ] sputum	  Cardiovascular:  [ ] chest pain [ ] palpitations [ ] edema	  Gastrointestinal:  [ ] nausea [ ] vomiting [ ] diarrhea [ ] constipation [ ] pain	  Genitourinary:  [ ] dysuria [ ] frequency [ ] hematuria [ ] discharge [ ] flank pain  [ ] incontinence  Musculoskeletal:  [ ] myalgias [ ] arthralgias [ ] arthritis  [ ] back pain  Neurological:  [ ] headache [ ] seizures  [ ] confusion/altered mental status    Allergies  No Known Allergies        ANTIMICROBIALS:  caspofungin IVPB    caspofungin IVPB 50 every 24 hours  meropenem  IVPB 1000 every 12 hours  metroNIDAZOLE  IVPB 500 every 8 hours  minocycline IVPB    minocycline IVPB 200 every 12 hours  trimethoprim / sulfamethoxazole IVPB 160 daily      OTHER MEDS:  MEDICATIONS  (STANDING):  albuterol/ipratropium for Nebulization 3 every 6 hours PRN  buDESOnide    Inhalation Suspension 0.5 every 12 hours  fentaNYL    Injectable 25 every 6 hours PRN  insulin lispro (ADMELOG) corrective regimen sliding scale  every 6 hours  methylPREDNISolone sodium succinate Injectable 32 <User Schedule>  oxyCODONE    Solution 5 every 4 hours PRN  oxyCODONE    Solution 2.5 every 4 hours PRN  pantoprazole Infusion 8 <Continuous>  phenylephrine    Infusion 1.5 <Continuous>  vasopressin Infusion 0.03 <Continuous>      Vital Signs Last 24 Hrs  T(C): 37.5 (29 Dec 2024 15:00), Max: 38.2 (29 Dec 2024 12:00)  T(F): 99.5 (29 Dec 2024 15:00), Max: 100.8 (29 Dec 2024 12:00)  HR: 97 (29 Dec 2024 17:21) (82 - 105)  BP: --  BP(mean): --  RR: 25 (29 Dec 2024 17:15) (17 - 28)  SpO2: 100% (29 Dec 2024 17:21) (94% - 100%)    Parameters below as of 29 Dec 2024 17:21  Patient On (Oxygen Delivery Method): ventilator        PHYSICAL EXAMINATION:  General: Trached and Sedated  HEENT: +Trach  Neck: Supple  Cardiac: RRR, No M/R/G  Resp: CTAB, No Wh/Rh/Ra  Abdomen: abdominal drains with ascitic drainage. NBS, NT/ND, No HSM, No rigidity or guarding  MSK: No LE edema. No Calf tenderness  : +testicular edema with skin sloughing  Skin: No rashes or lesions. Skin is warm and dry to the touch.   Neuro: Trached and Sedated  Psych: Unable to assess - trached and sedated                        7.4    7.94  )-----------( 46       ( 29 Dec 2024 16:27 )             21.2       12-29    136  |  104  |  20  ----------------------------<  197[H]  4.0   |  21[L]  |  0.38[L]    Ca    7.3[L]      29 Dec 2024 12:40  Phos  3.2     12-29  Mg     2.3     12-29    TPro  3.6[L]  /  Alb  2.4[L]  /  TBili  15.8[H]  /  DBili  x   /  AST  233[H]  /  ALT  206[H]  /  AlkPhos  151[H]  12-29      Urinalysis Basic - ( 29 Dec 2024 12:40 )    Color: x / Appearance: x / SG: x / pH: x  Gluc: 197 mg/dL / Ketone: x  / Bili: x / Urobili: x   Blood: x / Protein: x / Nitrite: x   Leuk Esterase: x / RBC: x / WBC x   Sq Epi: x / Non Sq Epi: x / Bacteria: x        MICROBIOLOGY:  v  Body Fluid  12-26-24   Rare Enterococcus faecalis  --  Enterococcus faecalis      Body Fluid  12-26-24   Rare Enterococcus faecalis  --  Enterococcus faecalis      .Blood BLOOD  12-24-24   No growth at 5 days  --  --      Combi-Cath  12-23-24   Commensal charity consistent with body site  --    No polymorphonuclear cells seen per low power field  No squamous epithelial cells per low power field  No organisms seen per oil power field      .Blood BLOOD  12-23-24   Growth in anaerobic bottle: Clostridium paraputrificum "Susceptibilities  not performed"  Direct identification is available within approximately 3-5  hours either by Blood Panel Multiplexed PCR or Direct  MALDI-TOF. Details: [email or web address]  --  Blood Culture PCR      Abdominal Fl  12-20-24   Rare Enterococcus faecalis  --  Enterococcus faecalis      Trach Asp Tracheal Aspirate  12-19-24   Moderate Stenotrophomonas maltophilia  Commensal charity consistent with body site  --  Stenotrophomonas maltophilia      .Blood BLOOD  12-19-24   No growth at 5 days  --  --      Catheterized Catheterized  12-16-24   10,000 - 49,000 CFU/mL Enterococcus faecalis  --  Enterococcus faecalis      .Blood BLOOD  12-16-24   No growth at 5 days  --  --      Combi-Cath  12-16-24   Commensal charity consistent with body site  --    No polymorphonuclear leukocytes seen per low power field  No Squamous epithelial cells seen per low power field  No organisms seen per oil power field      .Blood BLOOD  12-16-24   No growth at 5 days  --  --      Combi-Cath  12-14-24   Commensal charity consistent with body site  --    Numerous polymorphonuclear leukocytes per low power field  No Squamous epithelial cells per low power field  No organisms seen per oil power field      .Other  12-12-24   No fungus isolated at 2 weeks.  --  --      .Blood BLOOD  12-07-24   No growth at 5 days  --  --      Body Fluid  12-07-24   No growth at 5 days  --    polymorphonuclear leukocytes seen  No organisms seen  by cytocentrifuge      .Blood BLOOD  12-06-24   Growth in aerobic and anaerobic bottles: Escherichia coli  See previous culture [id number]  --    Growth in aerobic bottle: Gram Negative Rods  Growth in anaerobic bottle: Gram Negative Rods      .Blood BLOOD  12-06-24   Growth in aerobic and anaerobic bottles: Escherichia coli  Direct identification is available within approximately 3-5  hours either by Blood Panel Multiplexed PCR or Direct  MALDI-TOF. Details: [email or web address]  --  Blood Culture PCR  Escherichia coli      .Blood BLOOD  12-05-24   No growth at 5 days  --  --      .Blood BLOOD  12-05-24   No growth at 5 days  --  --      .Stool  12-01-24   No enteric pathogens isolated.  (Stool culture examined for Salmonella,  Shigella, Campylobacter, Aeromonas, Plesiomonas,  Vibrio, E.coli O157 and Yersinia)  --  --        HIV-1 RNA Quantitative, Viral Load Log: NOT DET. lg /mL (12-18-24 @ 22:14)  Toxoplasma IgG Screen: 78.00 IU/mL (11-15-24 @ 00:41)  CMV IgG Antibody: 1.50 U/mL (11-15-24 @ 00:41)    CMVPCR Log: Det <1.54 Assay Dynamic Range: 34.5 to 1.0E+07 IU/mL (1.54 to 7.00 Log10 IU/mL)  Assay lower limit of quantification (LLOQ) is 34.5 IU/mL (1.54 Log10  IU/mL)  CMV DNA detected below the LLOQ will be reported as Detected < 34.5 IU/mL  (<1.54 Log10 IU/mL)  Nydia Cytomegalovirus (CMV) is an FDA-cleared quantitative test that  enables the detection and quantitation of CMV DNA in EDTA plasma of  infected transplant patients on the nydia 8800 system. This test was  verified by [employer]. Results should be interpreted  with consideration of all clinical findings and laboratory findings and  should not form the sole basis for a diagnosis or treatment decision. Nwl13OV/mL (12-23 @ 06:15)        RADIOLOGY:    <The imaging below has been reviewed and visualized by me independently. Findings as detailed in report below>    < from: CT Pelvis No Cont (12.25.24 @ 17:02) >  IMPRESSION:  Moderate diffuse subcutaneous/scrotal edema without defined collection or   subcutaneous air.    Small ascites with surgical drains terminating in the pelvis.    < end of copied text >

## 2024-12-30 NOTE — PROGRESS NOTE ADULT - ASSESSMENT
ASSESSMENT: 74 male w/ a PMHx of HTN, DM, AF, BPH, MASH cirrhosis and HCC s/p OLT on 11/15. Case was uneventful but post-op course has been prolonged and c/b delirium, aspiration, multiple episodes of acute hypoxic respiratory failure requiring reintubation from 11/20-11/24 & 11/24-11/26, AF w/ RVR, ileus requiring NGT, distended colon requiring rectal tube, dysphagia, malnutrition, hypernatremia, fevers, pancytopenia, poorly controlled glucoses, and left brachial VTE. Patient went into severe refractory septic shock on 12/6 w/ blood cultures positive for E. coli s/p s/p ex lap, total abd colectomy, end ileostomy, 3 intentionally retained laparotomy pads for bleeding, Abthera, IABP placement w/ admission to CTICU, Patient required inotropes, Jacqui, and CRRT post-op. s/p closure 12/9. IABP removed 12/10 and transferred back to SICU 12/13. SICU course c/b narrow complex arrythmia w/ ECG showing new ST elevations concerning for posterior wall MI vs vasospasms, cards consulted and started on heparin gtt for empiric ACS tx which was c/b GIB then transitioned to argatroban c/b bleed. TTE revealed LVOT obstruction & TODD.       PLAN:  Neuro:  - Pain: PRN oxy solution  - Opens eyes intermittently, Not following commands, off sedation  - CT head 11/19, 11/21, 11/25, 11/29, 12/5 negative  - EEG: Mild diffuse cerebral dysfunction that is not specific in etiology. No epileptic discharges recorded. No seizures recorded.  - Holding home xanax, Abilify, Zoloft, Remeron, Lexapro  - CT head 12/20 showing age-indeterminate infarct, f/u Neurology recs  - No need for MRI     Resp:  - S/p bedside tracheostomy 12/17  - PRVC 14/480/5/30  - trach collar daily, full support overnight  - c/w inhaled bronchodilator    CV:  - Pressors: kassandra  - home metoprolol 12.5 q8hr held for pressor req  - IABP removed 12/10  - TTE 12/6 w/ LVOT obstruction & TODD  - TTE 12/11 shows EF of 55-60%  - 250 albumin overnight for lactate  - 5 midodrine q8    GI:  - trend LFTs  - NPO, NGT (can be to gravity tube feeds or to gravity, no suction)   - protonix now BID  - monitor ALYSSA output    /Renal:  - CRRT restarted 12/23  - Monitor BUN/Cr, I&Os, trend UOP  - Scrotal US 12/15 -> edema, no abscess -> elevate  - repeat scrotal doppler for concern of ischemia > low flow state, low concern for abscess  - Bladder scan q12    Heme:  - trend H/H, PLTs, coags  - hep gtt and argatroban gtt held i/s/o bleed  - s/p desmopression 30 x 1 on 12/26    ID:  - ABX: jeniffer, caspo, bactrim, minocycline, flagyl, cyclosporine  - 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

## 2024-12-30 NOTE — PROGRESS NOTE ADULT - SUBJECTIVE AND OBJECTIVE BOX
24 HOUR EVENTS:  - trach collar 4hrs  - 250 albumin ON  - midodrine 5 q8  - BID protonix  - 1 PRBC/1 plt    NEURO  RASS (if intubated): 		CAM ICU (if concern for delirium):  Exam: AOx0  Meds: fentaNYL    Injectable 25 MICROGram(s) IV Push every 6 hours PRN Breakthrough Pain  oxyCODONE    Solution 5 milliGRAM(s) Oral every 4 hours PRN Severe Pain (7 - 10)  oxyCODONE    Solution 2.5 milliGRAM(s) Oral every 4 hours PRN Moderate Pain (4 - 6)      RESPIRATORY  RR: 19 (12-30-24 @ 00:15) (15 - 28)  SpO2: 100% (12-30-24 @ 00:15) (94% - 100%)  Wt(kg): --  Exam: Lungs CTA b/l  Mechanical Ventilation: Mode: AC/ CMV (Assist Control/ Continuous Mandatory Ventilation), RR (machine): 14, RR (patient): 23, TV (machine): 450, FiO2: 30, PEEP: 5, ITime: 0.8, MAP: 8, PIP: 15  ABG - ( 29 Dec 2024 23:20 )  pH: 7.43  /  pCO2: 32    /  pO2: 76    / HCO3: 21    / Base Excess: -2.5  /  SaO2: 97.8    Lactate: x                Meds: albuterol/ipratropium for Nebulization 3 milliLiter(s) Nebulizer every 6 hours PRN Shortness of Breath and/or Wheezing  buDESOnide    Inhalation Suspension 0.5 milliGRAM(s) Inhalation every 12 hours      CARDIOVASCULAR  HR: 100 (12-30-24 @ 00:15) (89 - 105)  BP: --  BP(mean): --  ABP: 110/48 (12-30-24 @ 00:15) (95/50 - 134/62)  ABP(mean): 68 (12-30-24 @ 00:15) (57 - 91)  Wt(kg): --  CVP(cm H2O): --      Exam: Normal S1/S2 w/o murmurs or rubs  Cardiac Rhythm: sinus  Perfusion     [ x]Adequate   [ ]Inadequate  Mentation   [ ]Normal       [x ]Reduced  Extremities  [x ]Warm         [ ]Cool  Volume Status [ ]Hypervolemic [ x]Euvolemic [ ]Hypovolemic  Meds: midodrine 5 milliGRAM(s) Oral every 8 hours  phenylephrine    Infusion 1.5 MICROgram(s)/kG/Min IV Continuous <Continuous>      GI/NUTRITION  Exam: abd mildly distended  Diet: tube feeds  Last Bowel Movement: 29-Dec-2024 (12-29-24 @ 19:00)  Last Bowel Movement: 28-Dec-2024 (12-28-24 @ 07:00)  Last Bowel Movement: 26-Dec-2024 (12-26-24 @ 19:15)  Last Bowel Movement: 25-Dec-2024 (12-25-24 @ 19:00)  Last Bowel Movement: 24-Dec-2024 (12-24-24 @ 19:00)  Last Bowel Movement: 23-Dec-2024 (12-23-24 @ 19:00)  Last Bowel Movement: 22-Dec-2024 (12-23-24 @ 07:30)  Last Bowel Movement: 21-Dec-2024 (12-21-24 @ 07:00)  Last Bowel Movement: 21-Dec-2024 (12-20-24 @ 19:00)  Last Bowel Movement: 17-Dec-2024 (12-17-24 @ 19:00)  Last Bowel Movement: 16-Dec-2024 (12-16-24 @ 19:00)  Last Bowel Movement: 16-Dec-2024 (12-16-24 @ 07:00)    Meds: pantoprazole  Injectable 40 milliGRAM(s) IV Push every 12 hours      GENITOURINARY  I&O's Detail    12-28 @ 07:01  -  12-29 @ 07:00  --------------------------------------------------------  IN:    Enteral Tube Flush: 100 mL    IV PiggyBack: 100 mL    IV PiggyBack: 850 mL    IV PiggyBack: 725 mL    Nepro with Carb Steady: 630 mL    Pantoprazole: 240 mL    Phenylephrine: 56 mL    Phenylephrine: 269.4 mL    Platelets - Single Donor: 225 mL    PRBCs (Packed Red Blood Cells): 300 mL    Vasopressin: 108 mL  Total IN: 3603.4 mL    OUT:    Bulb (mL): 190 mL    Bulb (mL): 200 mL    Ileostomy (mL): 950 mL    Other (mL): 700 mL    Other (mL): 1620 mL    VAC (Vacuum Assisted Closure) System (mL): 0 mL  Total OUT: 3660 mL    Total NET: -56.6 mL      12-29 @ 07:01  -  12-30 @ 00:41  --------------------------------------------------------  IN:    Enteral Tube Flush: 50 mL    IV PiggyBack: 100 mL    IV PiggyBack: 800 mL    IV PiggyBack: 100 mL    Nepro with Carb Steady: 810 mL    Pantoprazole: 150 mL    Phenylephrine: 156.8 mL    Platelets - Single Donor: 225 mL    PRBCs (Packed Red Blood Cells): 300 mL    Vasopressin: 76.5 mL  Total IN: 2768.3 mL    OUT:    Bulb (mL): 650 mL    Bulb (mL): 350 mL    Ileostomy (mL): 1600 mL    Other (mL): 960 mL    VAC (Vacuum Assisted Closure) System (mL): 200 mL  Total OUT: 3760 mL    Total NET: -991.7 mL          12-29    134[L]  |  105  |  20  ----------------------------<  169[H]  4.4   |  20[L]  |  0.38[L]    Ca    7.4[L]      29 Dec 2024 18:33  Phos  3.2     12-29  Mg     2.3     12-29    TPro  3.7[L]  /  Alb  2.5[L]  /  TBili  15.7[H]  /  DBili  x   /  AST  231[H]  /  ALT  210[H]  /  AlkPhos  150[H]  12-29    Meds: albumin human  5% IVPB 250 milliLiter(s) IV Intermittent once      HEMATOLOGIC  Meds:                         8.5    9.00  )-----------( 27       ( 30 Dec 2024 00:06 )             24.3     PT/INR - ( 30 Dec 2024 00:06 )   PT: 19.2 sec;   INR: 1.68 ratio         PTT - ( 30 Dec 2024 00:06 )  PTT:49.4 sec    INFECTIOUS DISEASES  T(C): 36.9 (12-29-24 @ 23:00), Max: 38.2 (12-29-24 @ 12:00)  Wt(kg): --  WBC Count: 9.00 K/uL (12-30 @ 00:06)  WBC Count: 9.46 K/uL (12-29 @ 20:23)  WBC Count: 7.94 K/uL (12-29 @ 16:27)  WBC Count: 8.55 K/uL (12-29 @ 12:40)  WBC Count: 7.45 K/uL (12-29 @ 06:55)  WBC Count: 7.78 K/uL (12-29 @ 01:25)    Recent Cultures:  Specimen Source: .Blood BLOOD, 12-28 @ 12:15; Results   No growth at 24 hours; Gram Stain: --; Organism: --  Specimen Source: Body Fluid, 12-26 @ 19:36; Results   Rare Enterococcus faecalis[!]; Gram Stain:   polymorphonuclear leukocytes seen  No organisms seen  by cytocentrifuge[!]; Organism: Enterococcus faecalis[!]  Specimen Source: Body Fluid, 12-26 @ 19:33; Results   Rare Enterococcus faecalis[!]; Gram Stain:   polymorphonuclear leukocytes seen  Gram positive cocci in pairs seen  by cytocentrifuge[!]; Organism: Enterococcus faecalis[!]  Specimen Source: .Blood BLOOD, 12-24 @ 03:30; Results   No growth at 5 days; Gram Stain: --; Organism: --  Specimen Source: Combi-Cath, 12-23 @ 16:37; Results   Commensal charity consistent with body site; Gram Stain:   No polymorphonuclear cells seen per low power field  No squamous epithelial cells per low power field  No organisms seen per oil power field; Organism: --  Specimen Source: .Blood BLOOD, 12-23 @ 16:03; Results   Growth in anaerobic bottle: Clostridium paraputrificum "Susceptibilities  not performed"  Direct identification is available within approximately 3-5  hours either by Blood Panel Multiplexed PCR or Direct  MALDI-TOF. Details: [email or web address][!]; Gram Stain:   Growth in anaerobic bottle: Gram Positive Rods[!]; Organism: Blood Culture PCR[!]    Meds: caspofungin IVPB      caspofungin IVPB 50 milliGRAM(s) IV Intermittent every 24 hours  cycloSPORINE  , modified (GENGRAF) Solution 25 milliGRAM(s) Oral <User Schedule>  meropenem  IVPB 1000 milliGRAM(s) IV Intermittent every 12 hours  metroNIDAZOLE  IVPB 500 milliGRAM(s) IV Intermittent every 8 hours  minocycline IVPB      minocycline IVPB 200 milliGRAM(s) IV Intermittent every 12 hours  trimethoprim / sulfamethoxazole IVPB 160 milliGRAM(s) IV Intermittent daily      ENDOCRINE  Capillary Blood Glucose    Meds: insulin lispro (ADMELOG) corrective regimen sliding scale   SubCutaneous every 6 hours  methylPREDNISolone sodium succinate Injectable 32 milliGRAM(s) IV Push <User Schedule>  vasopressin Infusion 0.03 Unit(s)/Min IV Continuous <Continuous>      ACCESS DEVICES:  [ ] Peripheral IV  [ ] Central Venous Line		[ ] R	[ ] L	[ ] IJ	[ ] Fem	[ ] SC	Placed:   [ ] Arterial Line			[ ] R	[ ] L	[ ] Fem	[ ] Rad	[ ] Ax	Placed:   [ ] PICC:					[ ] Mediport  [ ] Urinary Catheter, Date Placed:   [ ] Necessity of urinary, arterial, and venous catheters discussed    OTHER MEDICATIONS:  chlorhexidine 0.12% Liquid 15 milliLiter(s) Oral Mucosa every 12 hours  chlorhexidine 2% Cloths 1 Application(s) Topical <User Schedule>  CRRT Treatment    <Continuous>  FIRST- Mouthwash  BLM 10 milliLiter(s) Swish and Spit every 8 hours PRN  mupirocin 2% Ointment 1 Application(s) Topical every 12 hours  nystatin Powder 1 Application(s) Topical every 12 hours  Phoxillum Filtration BK 4 / 2.5 5000 milliLiter(s) CRRT <Continuous>  Phoxillum Filtration BK 4 / 2.5 5000 milliLiter(s) CRRT <Continuous>  PrismaSATE Dialysate BGK 4 / 2.5 5000 milliLiter(s) CRRT <Continuous>      IMAGING:

## 2024-12-30 NOTE — PROGRESS NOTE ADULT - SUBJECTIVE AND OBJECTIVE BOX
INFECTIOUS DISEASES FOLLOW UP-- Renée Carrero  [phone]    This is a follow up note for this  74yMale with  Status post liver transplantation    clinically doing poorly overall    ROS:  CONSTITUTIONAL:  Non interactive      Allergies    No Known Allergies    Intolerances        ANTIBIOTICS/RELEVANT:  antimicrobials  caspofungin IVPB 50 milliGRAM(s) IV Intermittent every 24 hours  caspofungin IVPB      metroNIDAZOLE  IVPB 500 milliGRAM(s) IV Intermittent every 8 hours  minocycline IVPB      minocycline IVPB 200 milliGRAM(s) IV Intermittent every 12 hours  trimethoprim / sulfamethoxazole IVPB 160 milliGRAM(s) IV Intermittent daily    immunologic:  cycloSPORINE  , modified (GENGRAF) Solution 25 milliGRAM(s) Oral <User Schedule>    OTHER:  albuterol/ipratropium for Nebulization 3 milliLiter(s) Nebulizer every 6 hours PRN  buDESOnide    Inhalation Suspension 0.5 milliGRAM(s) Inhalation every 12 hours  chlorhexidine 0.12% Liquid 15 milliLiter(s) Oral Mucosa every 12 hours  chlorhexidine 2% Cloths 1 Application(s) Topical <User Schedule>  CRRT Treatment    <Continuous>  fentaNYL    Injectable 25 MICROGram(s) IV Push every 6 hours PRN  FIRST- Mouthwash  BLM 10 milliLiter(s) Swish and Spit every 8 hours PRN  insulin lispro (ADMELOG) corrective regimen sliding scale   SubCutaneous every 6 hours  methylPREDNISolone sodium succinate Injectable 32 milliGRAM(s) IV Push <User Schedule>  midodrine 5 milliGRAM(s) Oral every 8 hours  mupirocin 2% Ointment 1 Application(s) Topical every 12 hours  nystatin Powder 1 Application(s) Topical every 12 hours  oxyCODONE    Solution 5 milliGRAM(s) Oral every 4 hours PRN  oxyCODONE    Solution 2.5 milliGRAM(s) Oral every 4 hours PRN  pantoprazole  Injectable 40 milliGRAM(s) IV Push every 12 hours  phenylephrine    Infusion 1.5 MICROgram(s)/kG/Min IV Continuous <Continuous>  Phoxillum Filtration BK 4 / 2.5 5000 milliLiter(s) CRRT <Continuous>  Phoxillum Filtration BK 4 / 2.5 5000 milliLiter(s) CRRT <Continuous>  PrismaSATE Dialysate BGK 4 / 2.5 5000 milliLiter(s) CRRT <Continuous>  vasopressin Infusion 0.03 Unit(s)/Min IV Continuous <Continuous>      Objective:  Vital Signs Last 24 Hrs  T(C): 36.4 (30 Dec 2024 11:00), Max: 36.9 (29 Dec 2024 23:00)  T(F): 97.6 (30 Dec 2024 11:00), Max: 98.5 (29 Dec 2024 23:00)  HR: 99 (30 Dec 2024 15:23) (83 - 103)  BP: --  BP(mean): --  RR: 19 (30 Dec 2024 14:30) (9 - 28)  SpO2: 98% (30 Dec 2024 15:23) (96% - 100%)    Parameters below as of 30 Dec 2024 12:11  Patient On (Oxygen Delivery Method): tracheostomy collar    O2 Concentration (%): 40    PHYSICAL EXAM:  Constitutional:non interactive  Eyes:DUSTIN, EOMI  Ear/Nose/Throat: no oral lesions, trach	  Respiratory: clear BL  Cardiovascular: S1S2  Gastrointestinal:midline surgical wound, ostomy  Extremities:no e/e/c  No Lymphadenopathy  IV sites not inflammed.    LABS:                        7.7    5.78  )-----------( 43       ( 30 Dec 2024 13:42 )             22.3     12-30    140  |  105  |  22  ----------------------------<  167[H]  3.9   |  20[L]  |  0.35[L]    Ca    7.6[L]      30 Dec 2024 13:42  Phos  2.7     12-30  Mg     2.3     12-30    TPro  3.7[L]  /  Alb  2.9[L]  /  TBili  17.5[H]  /  DBili  x   /  AST  165[H]  /  ALT  167[H]  /  AlkPhos  116  12-30    PT/INR - ( 30 Dec 2024 13:42 )   PT: 19.9 sec;   INR: 1.76 ratio         PTT - ( 30 Dec 2024 13:42 )  PTT:49.5 sec  Urinalysis Basic - ( 30 Dec 2024 13:42 )    Color: x / Appearance: x / SG: x / pH: x  Gluc: 167 mg/dL / Ketone: x  / Bili: x / Urobili: x   Blood: x / Protein: x / Nitrite: x   Leuk Esterase: x / RBC: x / WBC x   Sq Epi: x / Non Sq Epi: x / Bacteria: x        MICROBIOLOGY:            RECENT CULTURES:  12-28 @ 18:10  .Blood BLOOD  --  --  --    No growth at 24 hours  --  12-28 @ 12:15  .Blood BLOOD  --  --  --    No growth at 24 hours  --  12-26 @ 19:36  Body Fluid  Enterococcus faecalis  Enterococcus faecalis  ESAU    Rare Enterococcus faecalis  --  12-26 @ 19:33  Body Fluid  Enterococcus faecalis  Enterococcus faecalis  ESAU    Rare Enterococcus faecalis  --  12-24 @ 03:30  .Blood BLOOD  --  --  --    No growth at 5 days  --  12-23 @ 16:37  Combi-Cath  --  --  --    Commensal charity consistent with body site  --  12-23 @ 16:03  .Blood BLOOD  Blood Culture PCR  Blood Culture PCR  PCR    Growth in anaerobic bottle: Clostridium paraputrificum "Susceptibilities  not performed"  Direct identification is available within approximately 3-5  hours either by Blood Panel Multiplexed PCR or Direct  MALDI-TOF. Details: [email or web address]  --      RADIOLOGY & ADDITIONAL STUDIES:    < from: Xray Chest 1 View- PORTABLE-Routine (Xray Chest 1 View- PORTABLE-Routine in AM.) (12.29.24 @ 07:45) >  TECHNIQUE: Portable frontal view of the chest, 1 view.  COMPARISON 12/28/2024.  FINDINGS/  IMPRESSION:  Bilateral central lines in the SVC region. Tracheostomy tube. The   nasogastric tube courses below the left hemidiaphragm, tip off edge of   film.  HEART:difficult to access in this projection.  LUNGS: Atelectasis left base..  BONES: cervical fusion hardware    --- End of Report ---    < end of copied text >   INFECTIOUS DISEASES FOLLOW UP-- Renée Carrero  [phone]    This is a follow up note for this  74yMale with  Status post liver transplantation    clinically doing poorly overall    ROS:  CONSTITUTIONAL:  Non interactive      Allergies    No Known Allergies    Intolerances        ANTIBIOTICS/RELEVANT:  antimicrobials  caspofungin IVPB 50 milliGRAM(s) IV Intermittent every 24 hours  caspofungin IVPB      metroNIDAZOLE  IVPB 500 milliGRAM(s) IV Intermittent every 8 hours  minocycline IVPB      minocycline IVPB 200 milliGRAM(s) IV Intermittent every 12 hours  trimethoprim / sulfamethoxazole IVPB 160 milliGRAM(s) IV Intermittent daily    immunologic:  cycloSPORINE  , modified (GENGRAF) Solution 25 milliGRAM(s) Oral <User Schedule>    OTHER:  albuterol/ipratropium for Nebulization 3 milliLiter(s) Nebulizer every 6 hours PRN  buDESOnide    Inhalation Suspension 0.5 milliGRAM(s) Inhalation every 12 hours  chlorhexidine 0.12% Liquid 15 milliLiter(s) Oral Mucosa every 12 hours  chlorhexidine 2% Cloths 1 Application(s) Topical <User Schedule>  CRRT Treatment    <Continuous>  fentaNYL    Injectable 25 MICROGram(s) IV Push every 6 hours PRN  FIRST- Mouthwash  BLM 10 milliLiter(s) Swish and Spit every 8 hours PRN  insulin lispro (ADMELOG) corrective regimen sliding scale   SubCutaneous every 6 hours  methylPREDNISolone sodium succinate Injectable 32 milliGRAM(s) IV Push <User Schedule>  midodrine 5 milliGRAM(s) Oral every 8 hours  mupirocin 2% Ointment 1 Application(s) Topical every 12 hours  nystatin Powder 1 Application(s) Topical every 12 hours  oxyCODONE    Solution 5 milliGRAM(s) Oral every 4 hours PRN  oxyCODONE    Solution 2.5 milliGRAM(s) Oral every 4 hours PRN  pantoprazole  Injectable 40 milliGRAM(s) IV Push every 12 hours  phenylephrine    Infusion 1.5 MICROgram(s)/kG/Min IV Continuous <Continuous>  Phoxillum Filtration BK 4 / 2.5 5000 milliLiter(s) CRRT <Continuous>  Phoxillum Filtration BK 4 / 2.5 5000 milliLiter(s) CRRT <Continuous>  PrismaSATE Dialysate BGK 4 / 2.5 5000 milliLiter(s) CRRT <Continuous>  vasopressin Infusion 0.03 Unit(s)/Min IV Continuous <Continuous>      Objective:  Vital Signs Last 24 Hrs  T(C): 36.4 (30 Dec 2024 11:00), Max: 36.9 (29 Dec 2024 23:00)  T(F): 97.6 (30 Dec 2024 11:00), Max: 98.5 (29 Dec 2024 23:00)  HR: 99 (30 Dec 2024 15:23) (83 - 103)  BP: --  BP(mean): --  RR: 19 (30 Dec 2024 14:30) (9 - 28)  SpO2: 98% (30 Dec 2024 15:23) (96% - 100%)    Parameters below as of 30 Dec 2024 12:11  Patient On (Oxygen Delivery Method): tracheostomy collar    O2 Concentration (%): 40    PHYSICAL EXAM:  Constitutional:non interactive  Eyes:DUSTIN, EOMI  Ear/Nose/Throat: no oral lesions, trach  HD catheter RIJ	  Respiratory: clear BL  Cardiovascular: S1S2  Gastrointestinal:midline surgical wound, ostomy  Extremities:no e/e/c  No Lymphadenopathy  IV sites not inflammed.    LABS:                        7.7    5.78  )-----------( 43       ( 30 Dec 2024 13:42 )             22.3     12-30    140  |  105  |  22  ----------------------------<  167[H]  3.9   |  20[L]  |  0.35[L]    Ca    7.6[L]      30 Dec 2024 13:42  Phos  2.7     12-30  Mg     2.3     12-30    TPro  3.7[L]  /  Alb  2.9[L]  /  TBili  17.5[H]  /  DBili  x   /  AST  165[H]  /  ALT  167[H]  /  AlkPhos  116  12-30    PT/INR - ( 30 Dec 2024 13:42 )   PT: 19.9 sec;   INR: 1.76 ratio         PTT - ( 30 Dec 2024 13:42 )  PTT:49.5 sec  Urinalysis Basic - ( 30 Dec 2024 13:42 )    Color: x / Appearance: x / SG: x / pH: x  Gluc: 167 mg/dL / Ketone: x  / Bili: x / Urobili: x   Blood: x / Protein: x / Nitrite: x   Leuk Esterase: x / RBC: x / WBC x   Sq Epi: x / Non Sq Epi: x / Bacteria: x        MICROBIOLOGY:            RECENT CULTURES:  12-28 @ 18:10  .Blood BLOOD  --  --  --    No growth at 24 hours  --  12-28 @ 12:15  .Blood BLOOD  --  --  --    No growth at 24 hours  --  12-26 @ 19:36  Body Fluid  Enterococcus faecalis  Enterococcus faecalis  ESAU    Rare Enterococcus faecalis  --  12-26 @ 19:33  Body Fluid  Enterococcus faecalis  Enterococcus faecalis  ESAU    Rare Enterococcus faecalis  --  12-24 @ 03:30  .Blood BLOOD  --  --  --    No growth at 5 days  --  12-23 @ 16:37  Combi-Cath  --  --  --    Commensal charity consistent with body site  --  12-23 @ 16:03  .Blood BLOOD  Blood Culture PCR  Blood Culture PCR  PCR    Growth in anaerobic bottle: Clostridium paraputrificum "Susceptibilities  not performed"  Direct identification is available within approximately 3-5  hours either by Blood Panel Multiplexed PCR or Direct  MALDI-TOF. Details: [email or web address]  --      RADIOLOGY & ADDITIONAL STUDIES:    < from: Xray Chest 1 View- PORTABLE-Routine (Xray Chest 1 View- PORTABLE-Routine in AM.) (12.29.24 @ 07:45) >  TECHNIQUE: Portable frontal view of the chest, 1 view.  COMPARISON 12/28/2024.  FINDINGS/  IMPRESSION:  Bilateral central lines in the SVC region. Tracheostomy tube. The   nasogastric tube courses below the left hemidiaphragm, tip off edge of   film.  HEART:difficult to access in this projection.  LUNGS: Atelectasis left base..  BONES: cervical fusion hardware    --- End of Report ---    < end of copied text >

## 2024-12-30 NOTE — PROGRESS NOTE ADULT - ASSESSMENT
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 Post-op course c/b worsening mental status and acute hypoxic respiratory failure requiring multiple intubations/extubations, currently extubated (as of 11/26/24) and colonic ileus s/p several rounds of Relistor and Neostigmine with NGT and rectal tube currently in place now with septic shock of unclear etiology. Transplant nephrology consulted for metabolic acidosis and MORAIMA.    1. s/p OLT 11/15/24 with oliguric MORAIMA in setting of septic shock.  Likely hemodynamically mediated in setting of cardiogenic and septic shock on multiple vasopressors and prior IABP.   - CT AP non-con: Bilateral renal cysts including cysts with peripheral calcification in the left kidney.  - Initiated on CRRT 12/7/24- 12/15/24 at 1AM stopped. s/p IHD 12/18/24 however required levophed.   - Now back on CRRT, remains anuric with 0 UOP in 24 hour.   - Will continue CRRT for now   - Dose medication per CRRT. Monitor BP and labs.     If you have any questions, please feel free to contact me:  Magaly Tello MD PGY-4  Nephrology Fellow  [employer] Teams (Preferred)/ Pager [phone]   (After 5pm or on weekends please page the on-call fellow)

## 2024-12-30 NOTE — PROGRESS NOTE ADULT - ASSESSMENT
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 Post op course complicated by acute hypoxic respiratory failure requiring intubation multiple times, most recently on 12/7 with conversion to tracheostomy on 12/17, e.coli bacteremia with RTOR on 12/7 for concern for worsening septic shock and cardiogenic shock s/p balloon pump placement and total abdominal colectomy in setting of ischemic bowel s/p OR on 12/9 for ileostomy creation, and olirugirc MORAIMA requiring CRRT and now intermittent HD.    Diagnosed with pneumonia secondary to Stenotrophomonas    Also with growth of Enterococcus faecalis from abdominal drains and urine culture    More fever and shock event on 12/23/24    UA (12/19) 2 WBC  BCx (12/23) NGTD  Bronch Cx (12/23) NGTD    CT Chest (12/23) Bibasilar groundglass and tree in bud opacities which may represent distal airway impaction versus pneumonia.    CT A/P (12/23) Peripheral wedge-shaped areas of hypoattenuation involving the superior aspect of the spleen, suggestive of splenic infarcts (12-59). Mildly dilated loops of proximal small bowel without transition point, likely ileus.    Testicular US (12/23) No appreciable arterial flow with very limited venous flow in in the testes bilaterally. Interval decrease in diffuse scrotal edema. Small bilateral hydroceles.    CT Pelvis (12/25) Moderate diffuse subcutaneous/scrotal edema without defined collection or subcutaneous air.    Shock state overnight in context of likely GIB    Antibiotic Course:  Meropenem ( 11/22-11/29, 11/22-11/29, 12/16-)   Minocycline (12/21-)  Bactrim (11/16-11/24, 12/8-12/11, 12/21-)  Fluconazole (11/16-12/2, 12/12-12/16)   Caspofungin ( 12/6-12/11, 12/17-)  Cefepime (12/10-12/16)   Metronidazole (12/10-12/14)   Ganciclovir (12/7-12/13)   Linezolid (11/23-11/26, 12/6-12/11, 12/28-12/29)   Atovaquone (11/29-12/6)   Zosyn (11/20-11/22,12/6)   Tobramycin (12/6)   Valganciclovir (11/6-12/4)    #Positive Blood Culture (12/23 Clostridium paraputrificum)  Clostridium paraputrificum is generally penicillin and metronidazole susceptible  Meropenem should have conferred adequate coverage  -- Metronidazole 500 mg IV Q8H    #Leukocytosis, Fever, Shock, Transaminitis, Stenotrophomonas Pneumonia  --If further worsening shock overnight would repeat blood cultures and administer Tobramycin 600 mg IV x1 (7 mg/kg based on adjusted body weight).   --Recommend repeat CT A/P (to reevaluate intraabdominal collections and testicular contents)  --Continue Minocycline 200 mg IV Q12H (12/21 >) Would treat through 12/30 to complete 10 day course.   --Continue Meropenem 1g IV Q12H  --Doubt need for Caspofungin; no growth of fungal organisms    #Liver Transplant Recipient, Prophylactic Antibiotic  --Repeat CMV PCR on 12/30  --Given thrombocytopenia can consider holding Bactrim or now  Cytomegalovirus By PCR: Det <34.5 IU/mL (12.23.24 @ 06:15)

## 2024-12-30 NOTE — PROVIDER CONTACT NOTE (CHANGE IN STATUS NOTIFICATION) - ACTION/TREATMENT ORDERED:
cardizem gtt started, cardizem push given. Haldol IVP given to rule out withdrawal from home psych meds.
Give 500cc 5% albumin, verbal order. Send full set of labs
same as above

## 2024-12-30 NOTE — PROGRESS NOTE ADULT - SUBJECTIVE AND OBJECTIVE BOX
HPI:  Patient seen and examined at bedside in SICU.  Remains on pressor support.    Review Of Systems:    Unable to assess        Medications:  albuterol/ipratropium for Nebulization 3 milliLiter(s) Nebulizer every 6 hours PRN  buDESOnide    Inhalation Suspension 0.5 milliGRAM(s) Inhalation every 12 hours  caspofungin IVPB 50 milliGRAM(s) IV Intermittent every 24 hours  caspofungin IVPB      chlorhexidine 0.12% Liquid 15 milliLiter(s) Oral Mucosa every 12 hours  chlorhexidine 2% Cloths 1 Application(s) Topical <User Schedule>  CRRT Treatment    <Continuous>  cycloSPORINE  , modified (GENGRAF) Solution 25 milliGRAM(s) Oral <User Schedule>  fentaNYL    Injectable 25 MICROGram(s) IV Push every 6 hours PRN  FIRST- Mouthwash  BLM 10 milliLiter(s) Swish and Spit every 8 hours PRN  insulin lispro (ADMELOG) corrective regimen sliding scale   SubCutaneous every 6 hours  methylPREDNISolone sodium succinate Injectable 32 milliGRAM(s) IV Push <User Schedule>  metroNIDAZOLE  IVPB 500 milliGRAM(s) IV Intermittent every 8 hours  midodrine 5 milliGRAM(s) Oral every 8 hours  minocycline IVPB      minocycline IVPB 200 milliGRAM(s) IV Intermittent every 12 hours  mupirocin 2% Ointment 1 Application(s) Topical every 12 hours  nystatin Powder 1 Application(s) Topical every 12 hours  oxyCODONE    Solution 5 milliGRAM(s) Oral every 4 hours PRN  oxyCODONE    Solution 2.5 milliGRAM(s) Oral every 4 hours PRN  pantoprazole  Injectable 40 milliGRAM(s) IV Push every 12 hours  phenylephrine    Infusion 0.1 MICROgram(s)/kG/Min IV Continuous <Continuous>  Phoxillum Filtration BK 4 / 2.5 5000 milliLiter(s) CRRT <Continuous>  Phoxillum Filtration BK 4 / 2.5 5000 milliLiter(s) CRRT <Continuous>  PrismaSATE Dialysate BGK 4 / 2.5 5000 milliLiter(s) CRRT <Continuous>  trimethoprim / sulfamethoxazole IVPB 160 milliGRAM(s) IV Intermittent daily  vasopressin Infusion 0.03 Unit(s)/Min IV Continuous <Continuous>    PAST MEDICAL & SURGICAL HISTORY:  Diabetes      Transaminitis      Paroxysmal atrial fibrillation      Depression      BPH (benign prostatic hyperplasia)      Hypertension      Chronic atrial fibrillation      Coronary artery disease      Hepatocellular carcinoma      DM (diabetes mellitus)      HTN (hypertension)      Paroxysmal atrial fibrillation      Cirrhosis      HCC (hepatocellular carcinoma)      History of BPH      History of laparoscopic cholecystectomy      History of lumbar laminectomy      H/O prior ablation treatment      H/O percutaneous left heart catheterization        Vitals:  T(C): 36.6 (12-30-24 @ 19:00), Max: 36.9 (12-29-24 @ 23:00)  HR: 90 (12-30-24 @ 20:00) (81 - 103)  BP: --  BP(mean): --  RR: 21 (12-30-24 @ 20:00) (9 - 28)  SpO2: 100% (12-30-24 @ 20:00) (93% - 100%)  Wt(kg): --  Daily     Daily   I&O's Summary    29 Dec 2024 07:01  -  30 Dec 2024 07:00  --------------------------------------------------------  IN: 3973.5 mL / OUT: 5132 mL / NET: -1158.5 mL    30 Dec 2024 07:01  -  30 Dec 2024 20:41  --------------------------------------------------------  IN: 3461.9 mL / OUT: 2595 mL / NET: 866.9 mL        Physical Exam:  Appearance: Critically ill  Eyes: PERRL, EOMI, pink conjunctiva  HENT: +Trach, +NGT  Cardiovascular: RRR, S1, S2  Respiratory: Clear to auscultation bilaterally  Gastrointestinal: soft, non-tender, non-distended with normal bowel sounds  Musculoskeletal: No clubbing; no joint deformity   Neurologic: Non-focal                            8.8    5.03  )-----------( 24       ( 30 Dec 2024 18:13 )             25.0     12-30    137  |  107  |  21  ----------------------------<  158[H]  4.0   |  20[L]  |  0.34[L]    Ca    7.9[L]      30 Dec 2024 18:13  Phos  2.9     12-30  Mg     2.4     12-30    TPro  3.9[L]  /  Alb  3.0[L]  /  TBili  18.2[H]  /  DBili  x   /  AST  178[H]  /  ALT  187[H]  /  AlkPhos  117  12-30    PT/INR - ( 30 Dec 2024 18:13 )   PT: 20.7 sec;   INR: 1.83 ratio         PTT - ( 30 Dec 2024 18:13 )  PTT:52.9 sec      Cardiovascular Diagnostic Testing:  ECG: sinus rhythm    Echo: < from: Intra-Operative Transesophageal Echo W or WO Ultrasound Enhancing Agent (11.15.24 @ 07:46) >  --------------------------------------------------------------------------------  PRE-BYPASS FINDINGS     Left Ventricle:  Left ventricular ejection fraction is estimated at 60 to 65%. Normal left ventricularwall thickness. The left ventricular systolic function is normal There are no regional wall motion abnormalities seen.     Right Ventricle:  The right ventricular cavity is normal in size, with normal wall thickness and right ventricular systolic function is normal. Right ventricular systolic function is normal.     Left Atrium:  The left atrium is normal in size. No thrombus visualized in left atrial appendage.     Right Atrium:  The right atrium is normal in size. The right atrium is normal in size.     Interatrial Septum:  No PFO visualized with color flow doppler.     Aortic Valve:  The aortic valve appears trileaflet. There is no aortic valve stenosis. There is trace aortic regurgitation.     Mitral Valve:  There is no mitral valve stenosis. There is no mitral valve stenosis. There is trace mitral regurgitation.     Tricuspid Valve:  There is no evidence of tricuspid stenosis. There is trace tricuspid regurgitation.     Pericardium:  No pericardial effusion seen.     Post-Bypass:  S/P OLT. Under current loading conditions, hyperdynamic left ventricular systolic function, EF: 70-75% by visual estimate, no RWMA. Normal right ventricular systolic function. All other findings unchanged. Probe removed atraumatically, no blood. The patient tolerated the procedure well and without complications. Permanent recorded images are stored in the medical record.     Electronically signed by James Villareal on 11/15/2024 at 2:18:16 PM         *** Final ***    < end of copied text >      < from: TTE Limited W or WO Ultrasound Enhancing Agent (12.11.24 @ 09:28) >  _______________________________________________________________________________________     CONCLUSIONS:      1. Endocardial visualization enhanced with Definity (Ultrasound Enhancing Agent).   2. Left ventricular systolic function is normal with an ejection fraction visually estimated at 55 to 60 %.   3. Enlarged right ventricular cavity size and mildly reduced right ventricular systolic function.   4. Device lead is visualized in the right heart.    ________________________________________________________________________________________    < end of copied text >      Stress Testing:     Cath: 4/24/2024, patient had his LHC repeated with Ben Villareal MD at [facility].  Cath showed multivessel coronary artery disease, but the lesions previously noted were FFR negative.  He continues to have MIRTA 3 flow throughout.    Interpretation of Telemetry: Sinus     Imaging:

## 2024-12-30 NOTE — PROGRESS NOTE ADULT - ATTENDING COMMENTS
73 year old man with GONZALES cirrhosis and HCC, HTN, DM, AF, BPH  S/p OLT 11/15/24. Post-op course complicated by worsening mental status and acute hypoxic respiratory failure requiring multiple intubations/extubations, septic shock in the setting of ischemic bowel s/p total colectomy and ileostomy creation 12/9/24, MORAIMA in the setting of septic shock requiring CRRT.     s/p OLT 11/15/24 with oliguric MORAIMA in setting of septic shock.   - Initiated on CRRT on 12/7, not tolerating iHD due to hypotension, continue CRRT, minimal UF given low BP

## 2024-12-30 NOTE — PROGRESS NOTE ADULT - SUBJECTIVE AND OBJECTIVE BOX
Transplant Surgery - Multidisciplinary Rounds  --------------------------------------------------------------  OLT   11/15/2024        POD#45  Colectomy 12/7/2024   POD#23  IABP 12/7 removed 12/10  Tracheostomy 12/17/2024    Present:   Patient seen and examined with multidisciplinary Transplant team including Surgeon: Dr. Vora, PA Verito/ Ascencion, Hepatologist: Dr. Conteh and bedside RN in AM rounds.   Disciplines not in attendance will be notified of the plan.     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     s/p OLT 11/15 with post op course c/b:  Hypoxia: Reintubated 11/20, extubated 11/24->reintubated 11/24, extubated 11/26, reintubated 12/7, trached 12/17  Ileus   A fib  AMS/Seizure   L brachial DVT  Neutropenia  E coli Bacteremia (12/6)  s/p Coloctomy 12/7.   IABP 12/7, removed 12/10  Ec Faecalis UTI (12/16)  Stenotrophamonas in trach aspirate (12/19)  UGIB 12/26    Interval/Overnight Events:             Immunosuppression:  - Cyclo per level, MMF HELD, Solu 32  -ongoing monitoring for signs of rejection                      ROS: Unable to assess patient is lethargic, s/p tracheostomy    PHYSICAL EXAM:   Constitutional: trach to vent, awake  Eyes:  PERRLA  ENMT: nc/at, no thrush   Neck: supple, trach   Respiratory: CTA B/L  Cardiovascular: RRR  Gastrointestinal: incision clean/dry/intact + Ostomy pink output in bag, wound vac, ALYSSA x2 SS fluid  Genitourinary: +scrotal edema w/ large fluid collection; black discoloration   Extremities: SCD's in place and working bilaterally, + LE edema  Neurological: trach to vent , sedated   Skin: no rashes, ulcerations, lesions  Transplant Surgery - Multidisciplinary Rounds  --------------------------------------------------------------  OLT   11/15/2024        POD#45  Colectomy 12/7/2024   POD#24  IABP 12/7 removed 12/10  Tracheostomy 12/17/2024    Present:   Patient seen and examined with multidisciplinary Transplant team including Surgeon: Dr. Vora, PA Verito/ Ascencion, Hepatologist: Dr. Conteh and bedside RN in AM rounds.   Disciplines not in attendance will be notified of the plan.     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     s/p OLT 11/15 with post op course c/b:  Hypoxia: Reintubated 11/20, extubated 11/24->reintubated 11/24, extubated 11/26, reintubated 12/7, trached 12/17  Ileus   A fib  AMS/Seizure   L brachial DVT  Neutropenia  E coli Bacteremia (12/6)  s/p Coloctomy 12/7.   IABP 12/7, removed 12/10  Ec Faecalis UTI (12/16)  Stenotrophamonas in trach aspirate (12/19)  UGIB 12/26    Interval/Overnight Events:   -Afebrile, VSS  - on phenyl 0.4 and vaso 0.03   - on CRRT, anuric   -graft function: downtrending LFTs  -1 U PRBC for low H&H, 1 Plt and 1 Cryo per TEG  -s/p albumin 5% 250 cc x 2     Immunosuppression:  - Cyclo per level, MMF HELD, Solu 32  -ongoing monitoring for signs of rejection    MEDICATIONS  (STANDING):  buDESOnide    Inhalation Suspension 0.5 milliGRAM(s) Inhalation every 12 hours  caspofungin IVPB      caspofungin IVPB 50 milliGRAM(s) IV Intermittent every 24 hours  chlorhexidine 0.12% Liquid 15 milliLiter(s) Oral Mucosa every 12 hours  chlorhexidine 2% Cloths 1 Application(s) Topical <User Schedule>  CRRT Treatment    <Continuous>  cycloSPORINE  , modified (GENGRAF) Solution 25 milliGRAM(s) Oral <User Schedule>  insulin lispro (ADMELOG) corrective regimen sliding scale   SubCutaneous every 6 hours  methylPREDNISolone sodium succinate Injectable 32 milliGRAM(s) IV Push <User Schedule>  metroNIDAZOLE  IVPB 500 milliGRAM(s) IV Intermittent every 8 hours  midodrine 5 milliGRAM(s) Oral every 8 hours  minocycline IVPB      minocycline IVPB 200 milliGRAM(s) IV Intermittent every 12 hours  mupirocin 2% Ointment 1 Application(s) Topical every 12 hours  nystatin Powder 1 Application(s) Topical every 12 hours  pantoprazole  Injectable 40 milliGRAM(s) IV Push every 12 hours  phenylephrine    Infusion 1.5 MICROgram(s)/kG/Min (28 mL/Hr) IV Continuous <Continuous>  Phoxillum Filtration BK 4 / 2.5 5000 milliLiter(s) (1250 mL/Hr) CRRT <Continuous>  Phoxillum Filtration BK 4 / 2.5 5000 milliLiter(s) (250 mL/Hr) CRRT <Continuous>  PrismaSATE Dialysate BGK 4 / 2.5 5000 milliLiter(s) (1500 mL/Hr) CRRT <Continuous>  trimethoprim / sulfamethoxazole IVPB 160 milliGRAM(s) IV Intermittent daily  vasopressin Infusion 0.03 Unit(s)/Min (4.5 mL/Hr) IV Continuous <Continuous>    MEDICATIONS  (PRN):  albuterol/ipratropium for Nebulization 3 milliLiter(s) Nebulizer every 6 hours PRN Shortness of Breath and/or Wheezing  fentaNYL    Injectable 25 MICROGram(s) IV Push every 6 hours PRN Breakthrough Pain  FIRST- Mouthwash  BLM 10 milliLiter(s) Swish and Spit every 8 hours PRN Mouth Care  oxyCODONE    Solution 5 milliGRAM(s) Oral every 4 hours PRN Severe Pain (7 - 10)  oxyCODONE    Solution 2.5 milliGRAM(s) Oral every 4 hours PRN Moderate Pain (4 - 6)    PAST MEDICAL & SURGICAL HISTORY:  Diabetes    Transaminitis    Paroxysmal atrial fibrillation    Depression    BPH (benign prostatic hyperplasia)    Hypertension    Chronic atrial fibrillation    Coronary artery disease    Hepatocellular carcinoma    DM (diabetes mellitus)    HTN (hypertension)    Paroxysmal atrial fibrillation    Cirrhosis    HCC (hepatocellular carcinoma)    History of BPH    History of laparoscopic cholecystectomy    History of lumbar laminectomy    H/O prior ablation treatment    H/O percutaneous left heart catheterization    Vital Signs Last 24 Hrs  T(C): 36.4 (30 Dec 2024 11:00), Max: 37.5 (29 Dec 2024 15:00)  T(F): 97.6 (30 Dec 2024 11:00), Max: 99.5 (29 Dec 2024 15:00)  HR: 97 (30 Dec 2024 14:00) (83 - 103)  BP: --  BP(mean): --  RR: 27 (30 Dec 2024 14:00) (9 - 28)  SpO2: 98% (30 Dec 2024 14:00) (96% - 100%)    Parameters below as of 30 Dec 2024 12:11  Patient On (Oxygen Delivery Method): tracheostomy collar    O2 Concentration (%): 40    I&O's Summary    29 Dec 2024 07:01  -  30 Dec 2024 07:00  --------------------------------------------------------  IN: 3973.5 mL / OUT: 5132 mL / NET: -1158.5 mL    30 Dec 2024 07:01  -  30 Dec 2024 14:18  --------------------------------------------------------  IN: 1894 mL / OUT: 1394 mL / NET: 500 mL                        7.7    5.78  )-----------( 43       ( 30 Dec 2024 13:42 )             22.3     12-30    140  |  105  |  22  ----------------------------<  167[H]  3.9   |  20[L]  |  0.35[L]    Ca    7.6[L]      30 Dec 2024 13:42  Phos  2.7     12-30  Mg     2.3     12-30    TPro  3.7[L]  /  Alb  2.9[L]  /  TBili  17.5[H]  /  DBili  x   /  AST  165[H]  /  ALT  167[H]  /  AlkPhos  116  12-30    Culture - Blood (collected 12-28-24 @ 18:10)  Source: .Blood BLOOD  Preliminary Report (12-30-24 @ 01:02):    No growth at 24 hours    Culture - Blood (collected 12-28-24 @ 12:15)  Source: .Blood BLOOD  Preliminary Report (12-29-24 @ 18:01):    No growth at 24 hours    Culture - Body Fluid with Gram Stain (collected 12-26-24 @ 19:36)  Source: Body Fluid  Gram Stain (12-28-24 @ 20:11):    polymorphonuclear leukocytes seen    No organisms seen    by cytocentrifuge  Preliminary Report (12-28-24 @ 20:11):    Rare Enterococcus faecalis  Organism: Enterococcus faecalis (12-29-24 @ 15:59)  Organism: Enterococcus faecalis (12-29-24 @ 15:59)    Culture - Body Fluid with Gram Stain (collected 12-26-24 @ 19:33)  Source: Body Fluid  Gram Stain (12-27-24 @ 07:00):    polymorphonuclear leukocytes seen    Gram positive cocci in pairs seen    by cytocentrifuge  Preliminary Report (12-28-24 @ 09:48):    Rare Enterococcus faecalis  Organism: Enterococcus faecalis (12-29-24 @ 07:55)  Organism: Enterococcus faecalis (12-29-24 @ 07:55)    Culture - Blood (collected 12-24-24 @ 03:30)  Source: .Blood BLOOD  Final Report (12-29-24 @ 07:00):    No growth at 5 days    Culture - Bronchial (collected 12-23-24 @ 16:37)  Source: Combi-Cath  Gram Stain (12-23-24 @ 22:50):    No polymorphonuclear cells seen per low power field    No squamous epithelial cells per low power field    No organisms seen per oil power field  Final Report (12-25-24 @ 08:06):    Commensal charity consistent with body site    Culture - Blood (collected 12-23-24 @ 16:03)  Source: .Blood BLOOD  Gram Stain (12-28-24 @ 12:55):    Growth in anaerobic bottle: Gram Positive Rods  Final Report (12-29-24 @ 11:10):    Growth in anaerobic bottle: Clostridium paraputrificum "Susceptibilities    not performed"    Direct identification is available within approximately 3-5    hours either by Blood Panel Multiplexed PCR or Direct    MALDI-TOF. Details: [email or web address]  Organism: Blood Culture PCR (12-29-24 @ 11:10)  Organism: Blood Culture PCR (12-29-24 @ 11:10)    ROS: Unable to assess patient is lethargic, s/p tracheostomy    PHYSICAL EXAM:   Constitutional: trach to vent, awake  Eyes:  PERRLA  ENMT: nc/at, no thrush   Neck: supple, trach   Respiratory: CTA B/L  Cardiovascular: RRR  Gastrointestinal: incision clean/dry/intact + Ostomy pink output in bag, wound vac, ALYSSA x2 SS fluid  Genitourinary: +scrotal edema w/ large fluid collection; black discoloration   Extremities: SCD's in place and working bilaterally, + LE edema  Neurological: trach to vent , sedated   Skin: no rashes, ulcerations, lesions

## 2024-12-30 NOTE — ADVANCED PRACTICE NURSE CONSULT - ASSESSMENT
Seen with PT wound care Rico Angeles for routine vac dressing  and complete ostomy pouching system changes. (see PT wound note for details). Son at bedside. Pt in bed , eyes opens briefly with verbal stimuli, otherwise closed most of the times.  Complete pouching system changed.  On exam:  Stoma 1 3/4" with known grayish adherent slough remains. Stoma is maroon red in middle, more loosened slough noted, stoma is viable. Some bleeding noted at mucocutaneous junction controlled by gentle pressure. No active bleeding noted during visit. Stoma is functioning for reddish -brown drainage, Peristomal skin and mucocutaneous junction intact.   Noted creases/skin indents at 3 and 9 o'clock. Dusted the peristomal skin with stoma powder excess removed.  Dab in with Cavilon 3M no sting barrier liquid film.  Re pouched w/ 2 1/4" flat skin barrier.  Bead of stoma paste applied to skin creases to level the surface and at the back of skin barrier near opening to caulk & high output pouch re- attached to bedside drainage bag. Patient  tolerated procedure well. Supplies and pattern  left at the bedside.  Will continue to follow up.

## 2024-12-30 NOTE — PROGRESS NOTE ADULT - ATTENDING COMMENTS
I have seen and examined this patient on multidisciplinary SICU rounds this morning. I have reviewed all new labs, imaging and reports. 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     Received 1 unit platelets overnight and 1 unit pRBC this morning for Hgb 7.6. Receiving additional albumin boluses given persistent neosynephrine drip requirements.     Waxing and waning mental status.    Wean kassandra and vaso as tolerated.   Midodrine 5 mg q8h, can increase tomorrow if persistent pressor requirements.   Given significant CAD, goal Hgb > 8. Transfuse as needed.  Continue TEG based resuscitation.   Trach mask trials as tolerated. Goal for 6 hours today, can divide into 4 hours + 2 hours after rest if needed.   Monitor ileostomy output and replete as needed.   Continue tube feeds at goal with gastric feeds.   Antibiotics per Transplant.      The patient required critical care. Critical care time was indicated due to the patient's inherent instability and high risk for decompensation. E & M work was done by me in multiple 10-15 minute intervals over the course of the day in the ICU. I have coordinated care with multiple teams, and reviewed the medical record, consult notes, ICU flow sheets, cardiac monitoring, mechanical ventilation, medication record, brain and body imaging, and serial sequential labs.             Total time spent in the care of this patient today (excluding procedures): 50 minutes                     Over 50% of the total time was spent in discussion and coordination of care with consulting services, dietary and rehab services.      Courtney Sewell M.D.  Department of Trauma, Acute Care Surgery and Surgical Critical Care

## 2024-12-30 NOTE — ADVANCED PRACTICE NURSE CONSULT - RECOMMEDATIONS
Will recommend:  1. Monitor output.  2. Empty pouch when 1/3-1/2 full   3. Change pouching system every 3-4 days & prn leakage  4. Contact ostomy specialists if questions, concerns/issues .  5. Supplies: [place] 2 1/4" Ceraplus flat skin barrier (#99960), Lianne 2 1/4" drainable pouch (#61141); Accessory products:  stoma paste #[id number], stoma powder (#8615) & Cavilon No sting barrier film wipe (#1343)  Will f/u for ostomy education when appropriate. Pt remains acute requiring SICU care.

## 2024-12-30 NOTE — PROVIDER CONTACT NOTE (CHANGE IN STATUS NOTIFICATION) - SITUATION
patient becoming more hypotensive, increasing kassandra requirements quickly, currently at 1.5mcg
pt now hypophonic, a&ox1, opens eyes to commands, diaphoretic with rectal temp of 99.6, afib RVR with HR as high as 160s.
Pt febrile, , Pt restless

## 2024-12-30 NOTE — PROGRESS NOTE ADULT - ASSESSMENT
74 year-old with known coronary artery disease as above presents for liver transplant. Transplant performed 11/15.    PAF - currently sinus rhythm     Patient was septic on Friday, 12/6, and at that time, he was seen to be hyperdynamic with TODD and severe LVOT gradient.  Post ex-lap, patient seen to have newly reduced LVEF.  IABP placed. Inotrope and pressor support. IABP removed 12/10 without significant drop in cardiac output.  Weaned off both inotrope and pressors. Now restarted on beta-blocker.  Keep MAP >65 mmHg.    On 12/19, he had dynamic ECG changes concerning for acute occlusion of inferior-posterior territory. It was transient, resolved spontaneously without intervention, and serial troponin did not rise afterward.  Suspect transient occlusion of coronary. Now reperfused. Subsequent TTE without new wall motion abnormalities.   ASA and anticoagulation held for ongoing GI bleeding.  Beta-blockers on hold for shock state.    Plan for PCI prior to discharge, but patient is not optimized for elective PCI at this time.    Discussed with Transplant Team and family at length.

## 2024-12-30 NOTE — PROVIDER CONTACT NOTE (CHANGE IN STATUS NOTIFICATION) - ASSESSMENT
Pt febrile, restless, increase heart rate
hypotensive to 70s/30s, was on 0.4mcg kassandra drip since 7am, now acutely up to 1.5mcg kassandra drip
pt now hypophonic, a&ox1 change from a&ox4, opens eyes to commands, diaphoretic with rectal temp of 99.6, afib RVR with HR as high as 160s.

## 2024-12-30 NOTE — PROGRESS NOTE ADULT - ASSESSMENT
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   s/p OLT 11/15 with complicated post op course    [] Septic shock/Cardiogenic shock  [] s/p Colectomy 12/7   [] RTOR for closure and Ileostomy creation   [] IAPB 12/7- removed 12/10  - E coli Bacteremia (12/6)  Received Tobra x 1 12/6 .   - EC faecalis asc fluid ( 12/20) (12/26)  - Ec faecalis UTI (12/16)  - Tx ID following - Christopher/Caspo/Bactrim/Minocycline/added Linezolid  - Amikacin x1 12/26  - Vanco x1 12/26, 12/28 -> Linezolid added  - Neutropenia: received Neupogen 480mcg x2  - MORAIMA: CRRT started 12/7, stopped 12/15, resumed CRRT 12/23  - AMS; CT Head neg  - Hold diuretics   - 12/23 BCX: GPR  - repeat cultures today  - 12/23 CT chest/Abd/pelvis: GGO, splenic infarcts, ileus  - S/P tracheotomy 12/17  - UGIB s/p EGD showing generalized oozing, coagulopathy       - given 2u FFP, 2u cryo, 2u PLT (12/26)       - recieved 2uFFP, 2u cryo, 2u PLT, 3 uPRBC 12/27       - Protonix ggt       - TF at goal    [] s/p OLT POD #45  - LFT's downtrending from 12/25, Liver US: tortuous common hepatic artery with elevated peak systolic velocities up to 635 cm/s, not noted on prior study. growing perihepatic fluid collection  - repeat liver DUS unchanged  - Diet: increase TFs as needed  - Pain management: avoid narcotics  - Strict I&Os, nichols, wound vac placed 12/10   - US: L brachial DVT (holding daap)  - wound vac exchange for ileostomy (12/13)  - HIT panel neg (12/14)      [ ] Immunosuppression  - Tacrolimus stopped 11/18 in setting of AMS/Seizure, Started Cyclo 12/17  - Cyclo restarted 25mg QD, MMF HELD, Solumedrol 32 mg daily  - PPx: Gancyclovir (HELD) in setting of thrombocytopenia; repeat CMV PCR pending     [] lleus   -@ home on Linzess  - imaging with distended colon (likely opioid induced)  - s/p Neostigmine x 2  - s/p Relistor, last dose 12/1  - s/p colectomy with end ileostomy  (see above)  - ileostomy with >1L output, consider adding bulking agents  - Daily AXR     [] CMV viremia  - d/c gancylovir due to thrombocytopenia  - CMV PCR (12/11 and 12/16): neg, positive CMVPCR on 12/23 repeat CMV pending from 12/24    [ ] AMS  - Reintubated 11/20 Aspiration/hypoxia, extubated 11/24->fxwrttrufle48/24--> extubated 11/26 -> [id number]/7 -> tracheostomy 12/17 -> trach collar trials starting 12/29  - EEG 11/30 negative, Neurology following  - BH following   - off tacro  - on keppra  -CT Head No Cont (12/20): Age-indeterminate posterior left frontal lobe infarct.   -CT Head (12/25): Evolving subacute infarct in the left supramarginal gyrus  - SICU stated MRI not needed     [ ] HTN/ pAFib  [ ] coronary vasospasm vs posterior wall MI  - EKG 12/24 c/f ST depression, rising troponins: 1800s  - d/c argatroban and aspirin due to bleeding  - Heparin gtt 12/19-21  - Cards- plan for PCI prior to DC   - Off Amiodarone, off dobutamine  - Off metoprolol for soft bp.  - Dr. 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   s/p OLT 11/15 with complicated post op course    [] Septic shock/Cardiogenic shock  [] s/p Colectomy 12/7   [] RTOR for closure and Ileostomy creation   [] IAPB 12/7- removed 12/10  - E coli Bacteremia (12/6)  Received Tobra x 1 12/6 .   - EC faecalis asc fluid ( 12/20) (12/26)  - Ec faecalis UTI (12/16)  - Tx ID following - Christopher/Caspo/Bactrim/Minocycline/added Linezolid  - Amikacin x1 12/26  - Vanco x1 12/26, 12/28 -> Linezolid added  - Neutropenia: received Neupogen 480mcg x2  - MORAIMA: CRRT started 12/7, stopped 12/15, resumed CRRT 12/23  - AMS; CT Head neg  - Hold diuretics   - 12/23 BCX: GPR  - repeat cultures today  - 12/23 CT chest/Abd/pelvis: GGO, splenic infarcts, ileus  - S/P tracheotomy 12/17  - UGIB s/p EGD showing generalized oozing, coagulopathy       - given 2u FFP, 2u cryo, 2u PLT (12/26)       - recieved 2uFFP, 2u cryo, 2u PLT, 3 uPRBC 12/27       - Protonix ggt       - TF at goal    [] s/p OLT POD #45  - LFT's downtrending from 12/25, Liver US: tortuous common hepatic artery with elevated peak systolic velocities up to 635 cm/s, not noted on prior study. growing perihepatic fluid collection  - repeat liver DUS unchanged  - Diet: increase TFs as needed  - Pain management: avoid narcotics  - Strict I&Os, nichols, wound vac placed 12/10   - US: L brachial DVT (holding daap)  - wound vac exchange for ileostomy (12/13)  - HIT panel neg (12/14)  - f/u fract bili  -Albumin prn     [ ] Immunosuppression  - Tacrolimus stopped 11/18 in setting of AMS/Seizure, Started Cyclo 12/17  - Cyclo 25mg BID, MMF HELD, Solumedrol 32 mg daily  - PPx: Gancyclovir (HELD) in setting of thrombocytopenia; repeat CMV PCR pending     [] lleus   -@ home on Linzess  - imaging with distended colon (likely opioid induced)  - s/p Neostigmine x 2  - s/p Relistor, last dose 12/1  - s/p colectomy with end ileostomy  (see above)  - ileostomy with >1L output, consider adding bulking agents  - Daily AXR     [] CMV viremia  - d/c gancylovir due to thrombocytopenia  - CMV PCR (12/11 and 12/16): neg, positive CMVPCR on 12/23 repeat CMV pending from 12/24    [ ] AMS  - Reintubated 11/20 Aspiration/hypoxia, extubated 11/24->[id number]/24--> extubated 11/26 -> [id number]/7 -> tracheostomy 12/17 -> trach collar trials starting 12/29  - EEG 11/30 negative, Neurology following  - BH following   - off tacro  - on keppra  -CT Head No Cont (12/20): Age-indeterminate posterior left frontal lobe infarct.   -CT Head (12/25): Evolving subacute infarct in the left supramarginal gyrus  - SICU stated MRI not needed     [ ] HTN/ pAFib  [ ] coronary vasospasm vs posterior wall MI  - EKG 12/24 c/f ST depression, rising troponins: 1800s  - d/c argatroban and aspirin due to bleeding  - Heparin gtt 12/19-21  - Cards- plan for PCI prior to DC   - Off Amiodarone, off dobutamine  - Off metoprolol for soft bp.  - Dr. Quintanilla following    [ ] DM  - ISS     []Scrotal abscess   - US (12/12): 2.1 x0.9 x 3.2 cm focal, right testicle- possible abscess (12/12)  - Urology recs: CT scrotum review no debridement required  - Urology recs Derm consult for guidance on wound care   - 12/23 US doppler scrotum: no arterial flow, limited venous flow, bl hydrocele  - 12/15 CT CAP no acute changes   - Elevate scrotum w/ support Urology signed off 12/29  - CT scrotum when able

## 2024-12-30 NOTE — PROGRESS NOTE ADULT - SUBJECTIVE AND OBJECTIVE BOX
[facility] DIVISION OF KIDNEY DISEASES AND HYPERTENSION -- FOLLOW UP NOTE  --------------------------------------------------------------------------------  Chief Complaint: s/p OLT 11/15/24 with oliguric MORAIMA in setting of septic shock.    24 hour events/subjective: Pt. seen and examined at bedside earlier today. Pt. remains on vaso and phenyl and midodrine. Pt. on trach collar.     PAST HISTORY  --------------------------------------------------------------------------------  No significant changes to PMH, PSH, FHx, SHx, unless otherwise noted    ALLERGIES & MEDICATIONS  --------------------------------------------------------------------------------  Allergies    No Known Allergies    Intolerances      Standing Inpatient Medications  buDESOnide    Inhalation Suspension 0.5 milliGRAM(s) Inhalation every 12 hours  caspofungin IVPB      caspofungin IVPB 50 milliGRAM(s) IV Intermittent every 24 hours  chlorhexidine 0.12% Liquid 15 milliLiter(s) Oral Mucosa every 12 hours  chlorhexidine 2% Cloths 1 Application(s) Topical <User Schedule>  CRRT Treatment    <Continuous>  cycloSPORINE  , modified (GENGRAF) Solution 25 milliGRAM(s) Oral <User Schedule>  insulin lispro (ADMELOG) corrective regimen sliding scale   SubCutaneous every 6 hours  methylPREDNISolone sodium succinate Injectable 32 milliGRAM(s) IV Push <User Schedule>  metroNIDAZOLE  IVPB 500 milliGRAM(s) IV Intermittent every 8 hours  midodrine 5 milliGRAM(s) Oral every 8 hours  minocycline IVPB      minocycline IVPB 200 milliGRAM(s) IV Intermittent every 12 hours  mupirocin 2% Ointment 1 Application(s) Topical every 12 hours  nystatin Powder 1 Application(s) Topical every 12 hours  pantoprazole  Injectable 40 milliGRAM(s) IV Push every 12 hours  phenylephrine    Infusion 1.5 MICROgram(s)/kG/Min IV Continuous <Continuous>  Phoxillum Filtration BK 4 / 2.5 5000 milliLiter(s) CRRT <Continuous>  Phoxillum Filtration BK 4 / 2.5 5000 milliLiter(s) CRRT <Continuous>  PrismaSATE Dialysate BGK 4 / 2.5 5000 milliLiter(s) CRRT <Continuous>  trimethoprim / sulfamethoxazole IVPB 160 milliGRAM(s) IV Intermittent daily  vasopressin Infusion 0.03 Unit(s)/Min IV Continuous <Continuous>    PRN Inpatient Medications  albuterol/ipratropium for Nebulization 3 milliLiter(s) Nebulizer every 6 hours PRN  fentaNYL    Injectable 25 MICROGram(s) IV Push every 6 hours PRN  FIRST- Mouthwash  BLM 10 milliLiter(s) Swish and Spit every 8 hours PRN  oxyCODONE    Solution 5 milliGRAM(s) Oral every 4 hours PRN  oxyCODONE    Solution 2.5 milliGRAM(s) Oral every 4 hours PRN      REVIEW OF SYSTEMS  --------------------------------------------------------------------------------  Unable to obtain ROS due to current clinical status.     VITALS/PHYSICAL EXAM  --------------------------------------------------------------------------------  T(C): 36.4 (12-30-24 @ 11:00), Max: 37.5 (12-29-24 @ 14:00)  HR: 96 (12-30-24 @ 13:30) (83 - 103)  BP: --  RR: 22 (12-30-24 @ 13:30) (9 - 28)  SpO2: 98% (12-30-24 @ 13:30) (94% - 100%)  Wt(kg): --        12-29-24 @ 07:01  -  12-30-24 @ 07:00  --------------------------------------------------------  IN: 3973.5 mL / OUT: 5132 mL / NET: -1158.5 mL    12-30-24 @ 07:01  -  12-30-24 @ 13:31  --------------------------------------------------------  IN: 1882 mL / OUT: 1273 mL / NET: 609 mL      Physical Exam:  	Gen: trach  	Pulm: trach on collar.   	CV: RRR, S1S2+  	Abd: +BS, soft  	: No suprapubic tenderness  	MSK: 2+ edema   	Psych: Normal affect and mood  	Skin: Warm    LABS/STUDIES  --------------------------------------------------------------------------------              7.6    7.69  >-----------<  43       [12-30-24 @ 06:25]              22.3     138  |  106  |  23  ----------------------------<  144      [12-30-24 @ 06:25]  4.0   |  21  |  0.36        Ca     7.4     [12-30-24 @ 06:25]      Mg     2.3     [12-30-24 @ 06:25]      Phos  2.7     [12-30-24 @ 06:25]    TPro  3.6  /  Alb  2.5  /  TBili  18.8  /  DBili  x   /  AST  195  /  ALT  189  /  AlkPhos  146  [12-30-24 @ 06:25]    PT/INR: PT 19.4 , INR 1.70       [12-30-24 @ 06:25]  PTT: 49.5       [12-30-24 @ 06:25]      Creatinine Trend:  SCr 0.36 [12-30 @ 06:25]  SCr 0.37 [12-30 @ 00:06]  SCr 0.38 [12-29 @ 18:33]  SCr 0.38 [12-29 @ 12:40]  SCr 0.40 [12-29 @ 06:55]              Urinalysis - [12-30-24 @ 06:25]      Color  / Appearance  / SG  / pH       Gluc 144 / Ketone   / Bili  / Urobili        Blood  / Protein  / Leuk Est  / Nitrite       RBC  / WBC  / Hyaline  / Gran  / Sq Epi  / Non Sq Epi  / Bacteria       Vitamin D (25OH) <6.0      [11-21-24 @ 08:32]  TSH 0.68      [12-12-24 @ 12:27]  Lipid: chol 114, , HDL 47, LDL --      [04-24-24 @ 06:48]          IMAGING/RADIOLOGY: Reviewed.

## 2024-12-31 NOTE — PROGRESS NOTE ADULT - ASSESSMENT
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   s/p OLT 11/15 with complicated post op course    [] Septic shock/Cardiogenic shock  [] s/p Colectomy 12/7   [] RTOR for closure and Ileostomy creation   [] IAPB 12/7- removed 12/10  - E coli Bacteremia (12/6)  Received Tobra x 1 12/6 .   - EC faecalis asc fluid ( 12/20) (12/26)  - Ec faecalis UTI (12/16)  - Tx ID following - Christopher/Caspo/Bactrim/Minocycline/added Linezolid  - Amikacin x1 12/26  - Vanco x1 12/26, 12/28 -> Linezolid added  - Neutropenia: received Neupogen 480mcg x2  - MORAIMA: CRRT started 12/7, stopped 12/15, resumed CRRT 12/23  - AMS; CT Head neg  - Hold diuretics   - 12/23 BCX: GPR  - repeat cultures today  - 12/23 CT chest/Abd/pelvis: GGO, splenic infarcts, ileus  - S/P tracheotomy 12/17  - UGIB s/p EGD showing generalized oozing, coagulopathy       - given 2u FFP, 2u cryo, 2u PLT (12/26)       - recieved 2uFFP, 2u cryo, 2u PLT, 3 uPRBC 12/27       - Protonix ggt       - TF at goal    [] s/p OLT POD #45  - LFT's downtrending from 12/25, Liver US: tortuous common hepatic artery with elevated peak systolic velocities up to 635 cm/s, not noted on prior study. growing perihepatic fluid collection  - repeat liver DUS unchanged  - Diet: increase TFs as needed  - Pain management: avoid narcotics  - Strict I&Os, nichols, wound vac placed 12/10   - US: L brachial DVT (holding daap)  - wound vac exchange for ileostomy (12/13)  - HIT panel neg (12/14)  - f/u fract bili  -Albumin prn     [ ] Immunosuppression  - Tacrolimus stopped 11/18 in setting of AMS/Seizure, Started Cyclo 12/17  - Cyclo 25mg BID, MMF HELD, Solumedrol 32 mg daily  - PPx: Gancyclovir (HELD) in setting of thrombocytopenia; repeat CMV PCR pending     [] lleus   -@ home on Linzess  - imaging with distended colon (likely opioid induced)  - s/p Neostigmine x 2  - s/p Relistor, last dose 12/1  - s/p colectomy with end ileostomy  (see above)  - ileostomy with >1L output, consider adding bulking agents  - Daily AXR     [] CMV viremia  - d/c gancylovir due to thrombocytopenia  - CMV PCR (12/11 and 12/16): neg, positive CMVPCR on 12/23 repeat CMV pending from 12/24    [ ] AMS  - Reintubated 11/20 Aspiration/hypoxia, extubated 11/24->zhrimdcjaxp44/24--> extubated 11/26 -> rskkkrlwjez47/7 -> tracheostomy 12/17 -> trach collar trials starting 12/29  - EEG 11/30 negative, Neurology following  - BH following   - off tacro  - on keppra  -CT Head No Cont (12/20): Age-indeterminate posterior left frontal lobe infarct.   -CT Head (12/25): Evolving subacute infarct in the left supramarginal gyrus  - SICU stated MRI not needed     [ ] HTN/ pAFib  [ ] coronary vasospasm vs posterior wall MI  - EKG 12/24 c/f ST depression, rising troponins: 1800s  - d/c argatroban and aspirin due to bleeding  - Heparin gtt 12/19-21  - Cards- plan for PCI prior to DC   - Off Amiodarone, off dobutamine  - Off metoprolol for soft bp.  - Dr. 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   s/p OLT 11/15 with complicated post op course    [] Septic shock/Cardiogenic shock  [] s/p Colectomy 12/7   [] RTOR for closure and Ileostomy creation   [] IAPB 12/7- removed 12/10  - E coli Bacteremia (12/6)  Received Tobra x 1 12/6 .   - EC faecalis asc fluid ( 12/20) (12/26)  - Ec faecalis UTI (12/16)  - Tx ID following - Caspo/Bactrim/Minocycline/Flagyl   - Amikacin x1 12/26  - Vanco x1 12/26, 12/28 -> Linezolid added  - Neutropenia: received Neupogen 480mcg x2  - MORAIMA: CRRT started 12/7, stopped 12/15, resumed CRRT 12/23  - AMS; CT Head neg  - Hold diuretics   - 12/23 BCX: GPR  - 12/23 CT chest/Abd/pelvis: GGO, splenic infarcts, ileus  - S/P tracheotomy 12/17  - UGIB s/p EGD showing generalized oozing, coagulopathy       - given 2u FFP, 2u cryo, 2u PLT (12/26)       - receive 2uFFP, 2u cryo, 2u PLT, 3 uPRBC 12/27       - Protonix ggt       - TF at goal    [] s/p OLT POD #45  - LFT's downtrending from 12/25, Liver US: tortuous common hepatic artery with elevated peak systolic velocities up to 635 cm/s, not noted on prior study. growing perihepatic fluid collection  - repeat liver US unchanged  - Diet: increase TFs as needed  - Pain management: avoid narcotics  - Strict I&Os, wound vac placed 12/10   - US: L brachial DVT (holding daap)  - wound vac exchange for ileostomy (12/13)  - HIT panel neg (12/14)  - s/p 2PRBC, 2 cryo, 3 plts  - repeat CT CAP    [ ] Immunosuppression  - Tacrolimus stopped 11/18 in setting of AMS/Seizure, Started Cyclo 12/17  - Cyclo per level, MMF HELD, Solumedrol 32 mg daily  - PPx: Gancyclovir (HELD) in setting of thrombocytopenia; repeat CMV PCR pending     [] lleus   -@ home on Linzess  - imaging with distended colon (likely opioid induced)  - s/p Neostigmine x 2  - s/p Relistor, last dose 12/1  - s/p colectomy with end ileostomy  (see above)  - ileostomy with >1L output, consider adding bulking agents    [] CMV viremia  - d/c gancylovir due to thrombocytopenia  - CMV PCR (12/11 and 12/16): neg, positive CMVPCR on 12/23 repeat CMV pending from 12/24    [ ] AMS  - Reintubated 11/20 Aspiration/hypoxia, extubated 11/24->olevomfpdmy79/24--> extubated 11/26 -> [id number]/7 -> tracheostomy 12/17 -> trach collar trials starting 12/29  - EEG 11/30 negative, Neurology following  - BH following   - off tacro  - on keppra  -CT Head No Cont (12/20): Age-indeterminate posterior left frontal lobe infarct.   -CT Head (12/25): Evolving subacute infarct in the left supramarginal gyrus  -repeat CTH     [ ] HTN/ pAFib  [ ] coronary vasospasm vs posterior wall MI  - EKG 12/24 c/f ST depression, rising troponins: 1800s  - d/c argatroban and aspirin due to bleeding  - Heparin gtt 12/19-21  - Cards- plan for PCI prior to DC   - Off Amiodarone, off dobutamine  - Off metoprolol for soft bp.  - Dr. Quintanilla following    [ ] DM  - ISS     []Scrotal abscess   - US (12/12): 2.1 x0.9 x 3.2 cm focal, right testicle- possible abscess (12/12)  - Urology recs: CT scrotum review no debridement required  - Urology recs Derm consult for guidance on wound care   - 12/23 US doppler scrotum: no arterial flow, limited venous flow, bl hydrocele  - 12/15 CT CAP no acute changes   - Elevate scrotum w/ support Urology signed off 12/29  - CT scrotum when able

## 2024-12-31 NOTE — PROGRESS NOTE ADULT - ATTENDING COMMENTS
73 year old man with GONZALES cirrhosis and HCC, HTN, DM, AF, BPH  S/p OLT 11/15/24. Post-op course complicated by worsening mental status and acute hypoxic respiratory failure requiring multiple intubations/extubations, septic shock in the setting of ischemic bowel s/p total colectomy and ileostomy creation 12/9/24  S/p tracheostomy on 12/17/24.  UGI bleed 12/26/24.     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    E. coli Bacteremia (12/6), high fungitell 188 (12/14), Ec Faecalis UTI (12/16), Stenotrophomonas in trach aspirate (12/19) - on Caspofungin, metronidazole and minocycline.   He remains on phenylephrine and vasopressin.   Initiated on CRRT on 12/7, not tolerating iHD due to hypotension, has some edema but not tolerating UF due to low BP.   Lyes fair on CRRT, continue CRRT net even

## 2024-12-31 NOTE — PROGRESS NOTE ADULT - SUBJECTIVE AND OBJECTIVE BOX
[facility] DIVISION OF KIDNEY DISEASES AND HYPERTENSION -- FOLLOW UP NOTE  --------------------------------------- -----------------------------------------  Chief Complaint: : s/p OLT 11/15/24 with oliguric MORAIMA in setting of septic shock.    24 hour events/subjective: Pt. seen and examined at bedside earlier today in SICU. Pt. remains on trach/peg and vasopressor support. Son at bedside.        PAST HISTORY  --------------------------------------------------------------------------------  No significant changes to PMH, PSH, FHx, SHx, unless otherwise noted    ALLERGIES & MEDICATIONS  --------------------------------------------------------------------------------  Allergies    No Known Allergies    Intolerances      Standing Inpatient Medications  buDESOnide    Inhalation Suspension 0.5 milliGRAM(s) Inhalation every 12 hours  caspofungin IVPB 50 milliGRAM(s) IV Intermittent every 24 hours  caspofungin IVPB      chlorhexidine 0.12% Liquid 15 milliLiter(s) Oral Mucosa every 12 hours  chlorhexidine 2% Cloths 1 Application(s) Topical <User Schedule>  CRRT Treatment    <Continuous>  cycloSPORINE  , modified (GENGRAF) Solution 25 milliGRAM(s) Oral once  cycloSPORINE  , modified (GENGRAF) Solution 25 milliGRAM(s) Oral <User Schedule>  cycloSPORINE  , modified (GENGRAF) Solution 50 milliGRAM(s) Oral <User Schedule>  insulin lispro (ADMELOG) corrective regimen sliding scale   SubCutaneous every 6 hours  methylPREDNISolone sodium succinate Injectable 32 milliGRAM(s) IV Push <User Schedule>  metroNIDAZOLE  IVPB 500 milliGRAM(s) IV Intermittent every 8 hours  midodrine 5 milliGRAM(s) Oral every 8 hours  minocycline IVPB      minocycline IVPB 200 milliGRAM(s) IV Intermittent every 12 hours  mupirocin 2% Ointment 1 Application(s) Topical every 12 hours  nystatin Powder 1 Application(s) Topical every 12 hours  pantoprazole  Injectable 40 milliGRAM(s) IV Push every 12 hours  phenylephrine    Infusion 0.1 MICROgram(s)/kG/Min IV Continuous <Continuous>  Phoxillum Filtration BK 4 / 2.5 5000 milliLiter(s) CRRT <Continuous>  Phoxillum Filtration BK 4 / 2.5 5000 milliLiter(s) CRRT <Continuous>  PrismaSATE Dialysate BGK 4 / 2.5 5000 milliLiter(s) CRRT <Continuous>  trimethoprim / sulfamethoxazole IVPB 160 milliGRAM(s) IV Intermittent daily  vasopressin Infusion 0.03 Unit(s)/Min IV Continuous <Continuous>    PRN Inpatient Medications  albuterol/ipratropium for Nebulization 3 milliLiter(s) Nebulizer every 6 hours PRN  fentaNYL    Injectable 25 MICROGram(s) IV Push every 6 hours PRN  FIRST- Mouthwash  BLM 10 milliLiter(s) Swish and Spit every 8 hours PRN  oxyCODONE    Solution 5 milliGRAM(s) Oral every 4 hours PRN  oxyCODONE    Solution 2.5 milliGRAM(s) Oral every 4 hours PRN      REVIEW OF SYSTEMS  --------------------------------------------------------------------------------  Unable to obtain ROS due to current clinical status.     VITALS/PHYSICAL EXAM  --------------------------------------------------------------------------------  T(C): 36.6 (12-31-24 @ 07:00), Max: 36.6 (12-30-24 @ 19:00)  HR: 91 (12-31-24 @ 11:30) (81 - 118)  BP: 109/55 (12-31-24 @ 08:00) (109/55 - 109/55)  RR: 19 (12-31-24 @ 11:30) (13 - 46)  SpO2: 100% (12-31-24 @ 11:30) (89% - 100%)  Wt(kg): --        12-30-24 @ 07:01  -  12-31-24 @ 07:00  --------------------------------------------------------  IN: 5231.6 mL / OUT: 5528 mL / NET: -296.4 mL    12-31-24 @ 07:01  -  12-31-24 @ 11:48  --------------------------------------------------------  IN: 357.8 mL / OUT: 779 mL / NET: -421.2 mL      Physical Exam:  	Gen: trach  	Pulm: trach on collar.   	CV: RRR, S1S2+  	Abd: +BS, soft  	: No suprapubic tenderness  	MSK: 2+ edema   	Psych: Normal affect and mood  	Skin: Warm    LABS/STUDIES  --------------------------------------------------------------------------------              9.3    7.47  >-----------<  60       [12-31-24 @ 06:32]              28.1     137  |  106  |  22  ----------------------------<  164      [12-31-24 @ 06:32]  4.1   |  19  |  0.32        Ca     8.0     [12-31-24 @ 06:32]      Mg     2.4     [12-31-24 @ 06:32]      Phos  3.7     [12-31-24 @ 06:32]    TPro  4.2  /  Alb  3.0  /  TBili  20.1  /  DBili  x   /  AST  173  /  ALT  213  /  AlkPhos  129  [12-31-24 @ 06:32]    PT/INR: PT 19.2 , INR 1.69       [12-31-24 @ 06:32]  PTT: 44.9       [12-31-24 @ 06:32]      Creatinine Trend:  SCr 0.32 [12-31 @ 06:32]  SCr 0.32 [12-31 @ 00:21]  SCr 0.34 [12-30 @ 18:13]  SCr 0.35 [12-30 @ 13:42]  SCr 0.36 [12-30 @ 06:25]        Urinalysis - [12-31-24 @ 06:32]      Color  / Appearance  / SG  / pH       Gluc 164 / Ketone   / Bili  / Urobili        Blood  / Protein  / Leuk Est  / Nitrite       RBC  / WBC  / Hyaline  / Gran  / Sq Epi  / Non Sq Epi  / Bacteria       Vitamin D (25OH) <6.0      [11-21-24 @ 08:32]  TSH 0.68      [12-12-24 @ 12:27]  Lipid: chol 114, , HDL 47, LDL --      [04-24-24 @ 06:48]          IMAGING/RADIOLOGY: Reviewed.

## 2024-12-31 NOTE — PROGRESS NOTE ADULT - SUBJECTIVE AND OBJECTIVE BOX
24 HOUR EVENTS:  INTERVAL EVENTS:  - 1 U prbc for Hgb < 8, 1 Plt and 1 cryo for TEG  - did not respond to PRBC, so additional PRBC ordered  - 1 plt and 1 cryo again  - f/u CTAP/scrotum   - trach collar in AM    NEURO  AOx0  Meds: fentaNYL    Injectable 25 MICROGram(s) IV Push every 6 hours PRN Breakthrough Pain  oxyCODONE    Solution 5 milliGRAM(s) Oral every 4 hours PRN Severe Pain (7 - 10)  oxyCODONE    Solution 2.5 milliGRAM(s) Oral every 4 hours PRN Moderate Pain (4 - 6)    [x] Adequacy of sedation and pain control has been assessed and adjusted      RESPIRATORY  RR: 22 (12-30-24 @ 23:15) (9 - 28)  SpO2: 100% (12-30-24 @ 23:15) (93% - 100%)  Wt(kg): --  Exam: unlabored, clear to auscultation bilaterally  Mechanical Ventilation: Mode: AC/ CMV (Assist Control/ Continuous Mandatory Ventilation), RR (machine): 14, RR (patient): 17, TV (machine): 450, FiO2: 30, PEEP: 5, ITime: 0.9, MAP: 7, PIP: 17  ABG - ( 31 Dec 2024 00:04 )  pH: 7.41  /  pCO2: 35    /  pO2: 142   / HCO3: 22    / Base Excess: -2.1  /  SaO2: 99.9    Lactate: x        Meds: albuterol/ipratropium for Nebulization 3 milliLiter(s) Nebulizer every 6 hours PRN Shortness of Breath and/or Wheezing  buDESOnide    Inhalation Suspension 0.5 milliGRAM(s) Inhalation every 12 hours      CARDIOVASCULAR  HR: 91 (12-30-24 @ 23:15) (81 - 103)  BP: --  BP(mean): --  ABP: 117/52 (12-30-24 @ 23:15) (80/37 - 148/55)  ABP(mean): 78 (12-30-24 @ 23:15) (54 - 101)  Wt(kg): --  CVP(cm H2O): --      Exam: regular rate and rhythm  Cardiac Rhythm: sinus  Perfusion     [x]Adequate   [ ]Inadequate  Mentation   []Normal       [x]Reduced  Extremities  [x]Warm         [ ]Cool  Volume Status [ ]Hypervolemic [x]Euvolemic [ ]Hypovolemic  Meds: midodrine 5 milliGRAM(s) Oral every 8 hours  phenylephrine    Infusion 0.1 MICROgram(s)/kG/Min IV Continuous <Continuous>        GI/NUTRITION  Exam: mildly distended  Meds: pantoprazole  Injectable 40 milliGRAM(s) IV Push every 12 hours      GENITOURINARY  I&O's Detail    12-29 @ 07:01 - 12-30 @ 07:00  --------------------------------------------------------  IN:    Albumin 5%  - 250 mL: 250 mL    Enteral Tube Flush: 100 mL    IV PiggyBack: 100 mL    IV PiggyBack: 200 mL    IV PiggyBack: 950 mL    Nepro with Carb Steady: 1160 mL    Pantoprazole: 150 mL    Phenylephrine: 205.5 mL    Platelets - Single Donor: 450 mL    PRBCs (Packed Red Blood Cells): 300 mL    Vasopressin: 108 mL  Total IN: 3973.5 mL    OUT:    Bulb (mL): 500 mL    Bulb (mL): 670 mL    Ileostomy (mL): 2400 mL    Other (mL): 1262 mL    VAC (Vacuum Assisted Closure) System (mL): 300 mL  Total OUT: 5132 mL    Total NET: -1158.5 mL      12-30 @ 07:01  -  12-31 @ 00:31  --------------------------------------------------------  IN:    Albumin 5%  - 250 mL: 1000 mL    Cryoprecipitate: 150 mL    Enteral Tube Flush: 190 mL    IV PiggyBack: 250 mL    IV PiggyBack: 600 mL    Nepro with Carb Steady: 800 mL    Phenylephrine: 61.8 mL    Phenylephrine: 40.7 mL    Platelets - Single Donor: 445 mL    PRBCs (Packed Red Blood Cells): 300 mL    Vasopressin: 72 mL  Total IN: 3909.5 mL    OUT:    Bulb (mL): 100 mL    Bulb (mL): 200 mL    Ileostomy (mL): 1375 mL    Other (mL): 920 mL  Total OUT: 2595 mL    Total NET: 1314.5 mL          12-30    137  |  107  |  21  ----------------------------<  158[H]  4.0   |  20[L]  |  0.34[L]    Ca    7.9[L]      30 Dec 2024 18:13  Phos  2.9     12-30  Mg     2.4     12-30    TPro  3.9[L]  /  Alb  3.0[L]  /  TBili  18.2[H]  /  DBili  x   /  AST  178[H]  /  ALT  187[H]  /  AlkPhos  117  12-30    HEMATOLOGIC  Meds:   [x] VTE Prophylaxis                        8.8    5.03  )-----------( 24       ( 30 Dec 2024 18:13 )             25.0     PT/INR - ( 30 Dec 2024 18:13 )   PT: 20.7 sec;   INR: 1.83 ratio         PTT - ( 30 Dec 2024 18:13 )  PTT:52.9 sec      INFECTIOUS DISEASES  WBC Count: 5.03 K/uL (12-30 @ 18:13)  WBC Count: 5.78 K/uL (12-30 @ 13:42)  WBC Count: 7.69 K/uL (12-30 @ 06:25)    RECENT CULTURES:  Specimen Source: .Blood BLOOD  Date/Time: 12-28 @ 18:10  Culture Results:   No growth at 24 hours  Gram Stain: --  Organism: --  Specimen Source: .Blood BLOOD  Date/Time: 12-28 @ 12:15  Culture Results:   No growth at 48 Hours  Gram Stain: --  Organism: --  Specimen Source: Body Fluid  Date/Time: 12-26 @ 19:36  Culture Results:   Rare Enterococcus faecalis[!]  Gram Stain:   polymorphonuclear leukocytes seen  No organisms seen  by cytocentrifuge[!]  Organism: Enterococcus faecalis[!]  Specimen Source: Body Fluid  Date/Time: 12-26 @ 19:33  Culture Results:   Rare Enterococcus faecalis[!]  Gram Stain:   polymorphonuclear leukocytes seen  Gram positive cocci in pairs seen  by cytocentrifuge[!]  Organism: Enterococcus faecalis[!]    Meds: caspofungin IVPB 50 milliGRAM(s) IV Intermittent every 24 hours  caspofungin IVPB      cycloSPORINE  , modified (GENGRAF) Solution 25 milliGRAM(s) Oral <User Schedule>  metroNIDAZOLE  IVPB 500 milliGRAM(s) IV Intermittent every 8 hours  minocycline IVPB      minocycline IVPB 200 milliGRAM(s) IV Intermittent every 12 hours  trimethoprim / sulfamethoxazole IVPB 160 milliGRAM(s) IV Intermittent daily        ENDOCRINE  CAPILLARY BLOOD GLUCOSE      POCT Blood Glucose.: 160 mg/dL (30 Dec 2024 23:51)  POCT Blood Glucose.: 157 mg/dL (30 Dec 2024 11:56)  POCT Blood Glucose.: 137 mg/dL (30 Dec 2024 06:17)    Meds: insulin lispro (ADMELOG) corrective regimen sliding scale   SubCutaneous every 6 hours  methylPREDNISolone sodium succinate Injectable 32 milliGRAM(s) IV Push <User Schedule>  vasopressin Infusion 0.03 Unit(s)/Min IV Continuous <Continuous>

## 2024-12-31 NOTE — PROGRESS NOTE ADULT - ASSESSMENT
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 Post-op course c/b worsening mental status and acute hypoxic respiratory failure requiring multiple intubations/extubations, currently extubated (as of 11/26/24) and colonic ileus s/p several rounds of Relistor and Neostigmine with NGT and rectal tube currently in place now with septic shock due to ischemia bowel s/p total colectomy with ostomy creation.  Transplant nephrology consulted for metabolic acidosis and MORAIMA.    1. s/p OLT 11/15/24 with oliguric MORAIMA in setting of septic shock.  Likely hemodynamically mediated in setting of cardiogenic and septic shock on multiple vasopressors and prior IABP.   - CT AP non-con: Bilateral renal cysts including cysts with peripheral calcification in the left kidney.  - Initiated on CRRT 12/7/24- 12/15/24 at 1AM stopped. s/p IHD 12/18/24 however required levophed.   - Now back on CRRT, remains anuric with 0 UOP in 24 hour. Pt. remains on vasopressor support.   - Will continue CRRT for now   - Dose medication per CRRT. Monitor BP and labs.     If you have any questions, please feel free to contact me:  Magaly Tello MD PGY-4  Nephrology Fellow  [employer] Teams (Preferred)/ Pager [phone]   (After 5pm or on weekends please page the on-call fellow)

## 2024-12-31 NOTE — PROGRESS NOTE ADULT - ASSESSMENT
74 male w/ a PMHx of HTN, DM, AF, BPH, MASH cirrhosis and HCC s/p OLT on 11/15. Case was uneventful but post-op course has been prolonged and c/b delirium, aspiration, multiple episodes of acute hypoxic respiratory failure requiring reintubation from 11/20-11/24 & 11/24-11/26, AF w/ RVR, ileus requiring NGT, distended colon requiring rectal tube, dysphagia, malnutrition, hypernatremia, fevers, pancytopenia, poorly controlled glucoses, and left brachial VTE. Patient went into severe refractory septic shock on 12/6 w/ blood cultures positive for E. coli s/p s/p ex lap, total abd colectomy, end ileostomy, 3 intentionally retained laparotomy pads for bleeding, Abthera, IABP placement w/ admission to CTICU, Patient required inotropes, Jacqui, and CRRT post-op. s/p closure 12/9. IABP removed 12/10 and transferred back to SICU 12/13. SICU course c/b narrow complex arrythmia w/ ECG showing new ST elevations concerning for posterior wall MI vs vasospasms, cards consulted and started on heparin gtt for empiric ACS tx which was c/b GIB then transitioned to argatroban c/b bleed. TTE revealed LVOT obstruction & TODD.       Neuro:  - Pain: PRN oxy solution  - Opens eyes intermittently, Not following commands, off sedation  - CT head 11/19, 11/21, 11/25, 11/29, 12/5 negative  - EEG: Mild diffuse cerebral dysfunction that is not specific in etiology. No epileptic discharges recorded. No seizures recorded.  - Holding home xanax, Abilify, Zoloft, Remeron, Lexapro  - CT head 12/20 showing age-indeterminate infarct, f/u Neurology recs  - No need for MRI     Resp:  - S/p bedside tracheostomy 12/17  - PRVC 14/450/5/30  - trach collar daily, full support overnight  - c/w inhaled bronchodilator    CV:  - Pressors: kassandra  - home metoprolol 12.5 q8hr held for pressor req  - IABP removed 12/10  - TTE 12/6 w/ LVOT obstruction & TODD  - TTE 12/11 shows EF of 55-60%  - 250 albumin overnight for lactate  - 5 midodrine q8    GI:  - trend LFTs  - NPO, NGT (can be to gravity tube feeds or to gravity, no suction)   - protonix now BID  - monitor ALYSSA output    /Renal:  - CRRT restarted 12/23  - Monitor BUN/Cr, I&Os, trend UOP  - Scrotal US 12/15 -> edema, no abscess -> elevate  - repeat scrotal doppler for concern of ischemia > low flow state, low concern for abscess  - Bladder scan q12    Heme:  - trend H/H, PLTs, coags  - hep gtt and argatroban gtt held i/s/o bleed  - s/p desmopression 30 x 1 on 12/26    ID:  - ABX: caspo, bactrim, minocycline, flagyl, cyclosporine  - 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

## 2024-12-31 NOTE — CHART NOTE - NSCHARTNOTEFT_GEN_A_CORE
NUTRITION FOLLOW UP NOTE    PATIENT SEEN FOR: sicu follow up    SOURCE: [] Patient  [x] Current Medical Record  [x] RN  [] Family/support person at bedside  [x] Patient unavailable/inappropriate  [x] Other: Team     CHART REVIEWED/EVENTS NOTED.  [x] Nutrition Status:  -S/p OLT 11/15  -S/p Trach    -CRRT    DIET ORDER:   Diet, NPO with Tube Feed:   Tube Feeding Modality: Nasogastric  Nepro with Carb Steady (NEPRORTH)  Total Volume for 24 Hours (mL): 1200  Continuous  Starting Tube Feed Rate {mL per Hour}: 20  Increase Tube Feed Rate by (mL): 10     Every 4 hours  Until Goal Tube Feed Rate (mL per Hour): 50  Tube Feed Duration (in Hours): 24  Tube Feed Start Time: 10:00 (24)    ENTERAL NUTRITION  EN Order Provides:  1200ml formula, 2160kcal, 97g protein; meets 27kcal/kg and 1.2g protein/kg, based on wt: 80.7kg    Current Pump Rate:  EN provision: 83% EN volume goal provided in past x3days - since initiating new enteral regimen; previously on Nepro @ 55mL/hr     ANTHROPOMETRICS:  Drug Dosing Weight  Height (cm): 182.9 (09 Dec 2024 10:11)  Weight (kg): 99.5 (25 Dec 2024 00:00)  BMI (kg/m2): 29.7 (25 Dec 2024 00:00)  Daily Weight in k.5 (12-25)     NUTRITIONALLY PERTINENT MEDICATIONS:  MEDICATIONS  (STANDING):  caspofungin IVPB  caspofungin IVPB  insulin lispro (ADMELOG) corrective regimen sliding scale  methylPREDNISolone sodium succinate Injectable  metroNIDAZOLE  IVPB  midodrine  minocycline IVPB  minocycline IVPB  pantoprazole  Injectable  phenylephrine    Infusion  trimethoprim / sulfamethoxazole IVPB  vasopressin Infusion       NUTRITIONALLY PERTINENT LABS:   Na137 mmol/L Glu 164 mg/dL[H] K+ 4.1 mmol/L Cr  0.32 mg/dL[L] BUN 22 mg/dL  Phos 3.7 mg/dL  Alb 3.0 g/dL[L]  U/L[H]  U/L[H] Alkaline Phosphatase 129 U/L[H]            Finger Sticks:  POCT Blood Glucose.: 158 mg/dL ( @ 06:10)  POCT Blood Glucose.: 160 mg/dL ( @ 23:51)  POCT Blood Glucose.: 157 mg/dL ( @ 11:56)      NUTRITIONALLY PERTINENT MEDICATIONS/LABS:  [x] Reviewed  [x] Relevant notes on medications/labs:  -Vaso/Aldo  -Cyclosporine  -Solu-medrol; sliding scale insulin     EDEMA:  [x] Reviewed  [] Relevant notes:    GI/ I&O:  [x] Reviewed  -Ostomy: 2.2L ()     SKIN:   [x] Pressure injury previously noted    ESTIMATED NEEDS:  [x] No change:  Energy:  2178-2582kcal/day (27-32 kcal/kg)  Protein:  97-121g/day (1.2-1.5 g/kg)  Fluid:   ml/day or [X] defer to team  Based on: ideal body weight of  80.7kg     NUTRITION DIAGNOSIS:  [X] Prior Dx:  1. Increased protein-energy needs   2. Severe malnutrition (Acute)    EDUCATION:  [] Yes:  [x] Not appropriate/warranted    NUTRITION CARE PLAN:  1. Diet: defer diet to Team in setting of pressor use   -As medically feasible, recommend continuing Nepro @ 55mL x 24hrs providing 1320mL of formula, 2336kcal/day (29kcal/kg), 107g protein/day (1.3g/kg), 960mL free water - based on  IBW 80.7kg   -Monitor pressor status and ostomy output  2. Supplements: recommend Banatrol TID   3. Multivitamin/mineral supplementation: Nephro-kole    MONITORING AND EVALUATION:   RD remains available upon request and will follow up per protocol.    Malorie Pike, MS, RDN, CDN (Teams)   Available on [place] TEAMS

## 2024-12-31 NOTE — PROGRESS NOTE ADULT - SUBJECTIVE AND OBJECTIVE BOX
Transplant Surgery - Multidisciplinary Rounds  --------------------------------------------------------------  OLT   11/15/2024        POD#45  Colectomy 12/7/2024   POD#24  IABP 12/7 removed 12/10  Tracheostomy 12/17/2024    Present:   Patient seen and examined with multidisciplinary Transplant team including Surgeon: Dr. Vora, PA Verito/ [facility], Hepatologist: Dr. Conteh and bedside RN in AM rounds.   Disciplines not in attendance will be notified of the plan.     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     s/p OLT 11/15 with post op course c/b:  Hypoxia: Reintubated 11/20, extubated 11/24->reintubated 11/24, extubated 11/26, reintubated 12/7, trached 12/17  Ileus   A fib  AMS/Seizure   L brachial DVT  Neutropenia  E coli Bacteremia (12/6)  s/p Coloctomy 12/7.   IABP 12/7, removed 12/10  Ec Faecalis UTI (12/16)  Stenotrophamonas in trach aspirate (12/19)  UGIB 12/26    Interval/Overnight Events:   -Afebrile, VSS  -remains on dual pressors  - on CRRT net even, anuric   -continued correction per teg      Immunosuppression:  - Cyclo per level, MMF HELD, Solu 32  -ongoing monitoring for signs of rejection      TX DATA HERE      ROS: Unable to assess patient is lethargic, s/p tracheostomy    PHYSICAL EXAM:   Constitutional: trach to vent, awake  Eyes:  PERRLA  ENMT: nc/at, no thrush   Neck: supple, trach   Respiratory: CTA B/L  Cardiovascular: RRR  Gastrointestinal: incision clean/dry/intact + Ostomy pink output in bag, wound vac, ALYSSA x2 SS fluid  Genitourinary: +scrotal edema w/ large fluid collection; black discoloration   Extremities: SCD's in place and working bilaterally, + LE edema  Neurological: trach to vent , sedated   Skin: no rashes, ulcerations, lesions  Transplant Surgery - Multidisciplinary Rounds  --------------------------------------------------------------  OLT   11/15/2024        POD#46  Colectomy 12/7/2024   POD#25  IABP 12/7 removed 12/10  Tracheostomy 12/17/2024    Present:   Patient seen and examined with multidisciplinary Transplant team including Surgeon: Dr. Vora, JAZZ Pabon, Hepatologist: Dr. Conteh and bedside RN in AM rounds.   Disciplines not in attendance will be notified of the plan.     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     s/p OLT 11/15 with post op course c/b:  Hypoxia: Reintubated 11/20, extubated 11/24->reintubated 11/24, extubated 11/26, reintubated 12/7, trached 12/17  Ileus   A fib  AMS/Seizure   L brachial DVT  Neutropenia  E coli Bacteremia (12/6)  s/p Coloctomy 12/7.   IABP 12/7, removed 12/10  Ec Faecalis UTI (12/16)  Stenotrophamonas in trach aspirate (12/19)  UGIB 12/26    Interval/Overnight Events:   -Afebrile, on kassandra and vaso   -on CRRT net even, anuric   -continued correction per teg    Immunosuppression:  - Cyclo per level, MMF HELD, Solu 32  -ongoing monitoring for signs of rejection        MEDICATIONS  (STANDING):  buDESOnide    Inhalation Suspension 0.5 milliGRAM(s) Inhalation every 12 hours  caspofungin IVPB 50 milliGRAM(s) IV Intermittent every 24 hours  caspofungin IVPB      chlorhexidine 0.12% Liquid 15 milliLiter(s) Oral Mucosa every 12 hours  chlorhexidine 2% Cloths 1 Application(s) Topical <User Schedule>  CRRT Treatment    <Continuous>  cycloSPORINE  , modified (GENGRAF) Solution 25 milliGRAM(s) Oral <User Schedule>  cycloSPORINE  , modified (GENGRAF) Solution 50 milliGRAM(s) Oral <User Schedule>  insulin lispro (ADMELOG) corrective regimen sliding scale   SubCutaneous every 6 hours  methylPREDNISolone sodium succinate Injectable 32 milliGRAM(s) IV Push <User Schedule>  metroNIDAZOLE  IVPB 500 milliGRAM(s) IV Intermittent every 8 hours  midodrine 10 milliGRAM(s) Oral every 8 hours  minocycline IVPB      minocycline IVPB 200 milliGRAM(s) IV Intermittent every 12 hours  mupirocin 2% Ointment 1 Application(s) Topical every 12 hours  nystatin Powder 1 Application(s) Topical every 12 hours  pantoprazole  Injectable 40 milliGRAM(s) IV Push every 12 hours  phenylephrine    Infusion 0.1 MICROgram(s)/kG/Min (3.73 mL/Hr) IV Continuous <Continuous>  Phoxillum Filtration BK 4 / 2.5 5000 milliLiter(s) (1250 mL/Hr) CRRT <Continuous>  Phoxillum Filtration BK 4 / 2.5 5000 milliLiter(s) (250 mL/Hr) CRRT <Continuous>  PrismaSATE Dialysate BGK 4 / 2.5 5000 milliLiter(s) (1500 mL/Hr) CRRT <Continuous>  trimethoprim / sulfamethoxazole IVPB 160 milliGRAM(s) IV Intermittent daily  vasopressin Infusion 0.03 Unit(s)/Min (4.5 mL/Hr) IV Continuous <Continuous>    MEDICATIONS  (PRN):  albuterol/ipratropium for Nebulization 3 milliLiter(s) Nebulizer every 6 hours PRN Shortness of Breath and/or Wheezing  fentaNYL    Injectable 25 MICROGram(s) IV Push every 6 hours PRN Breakthrough Pain  FIRST- Mouthwash  BLM 10 milliLiter(s) Swish and Spit every 8 hours PRN Mouth Care  oxyCODONE    Solution 5 milliGRAM(s) Oral every 4 hours PRN Severe Pain (7 - 10)  oxyCODONE    Solution 2.5 milliGRAM(s) Oral every 4 hours PRN Moderate Pain (4 - 6)      PAST MEDICAL & SURGICAL HISTORY:  Diabetes      Transaminitis      Paroxysmal atrial fibrillation      Depression      BPH (benign prostatic hyperplasia)      Hypertension      Chronic atrial fibrillation      Coronary artery disease      Hepatocellular carcinoma      DM (diabetes mellitus)      HTN (hypertension)      Paroxysmal atrial fibrillation      Cirrhosis      HCC (hepatocellular carcinoma)      History of BPH      History of laparoscopic cholecystectomy      History of lumbar laminectomy      H/O prior ablation treatment      H/O percutaneous left heart catheterization          Vital Signs Last 24 Hrs  T(C): 36.2 (31 Dec 2024 15:00), Max: 36.6 (30 Dec 2024 19:00)  T(F): 97.2 (31 Dec 2024 15:00), Max: 97.9 (30 Dec 2024 19:00)  HR: 94 (31 Dec 2024 16:15) (81 - 118)  BP: 109/55 (31 Dec 2024 08:00) (109/55 - 109/55)  BP(mean): 75 (31 Dec 2024 08:00) (75 - 75)  RR: 30 (31 Dec 2024 16:15) (13 - 46)  SpO2: 98% (31 Dec 2024 16:15) (89% - 100%)    Parameters below as of 31 Dec 2024 12:17  Patient On (Oxygen Delivery Method): tracheostomy collar  O2 Flow (L/min): 10  O2 Concentration (%): 40    I&O's Summary    30 Dec 2024 07:01  -  31 Dec 2024 07:00  --------------------------------------------------------  IN: 5231.6 mL / OUT: 5528 mL / NET: -296.4 mL    31 Dec 2024 07:01  -  31 Dec 2024 16:33  --------------------------------------------------------  IN: 1015.1 mL / OUT: 1767 mL / NET: -751.9 mL                              8.8    5.75  )-----------( 39       ( 31 Dec 2024 12:06 )             25.6     12-31    139  |  108  |  21  ----------------------------<  129[H]  4.0   |  19[L]  |  0.30[L]    Ca    7.8[L]      31 Dec 2024 12:06  Phos  3.2     12-31  Mg     2.4     12-31    TPro  3.9[L]  /  Alb  2.8[L]  /  TBili  19.2[H]  /  DBili  x   /  AST  161[H]  /  ALT  211[H]  /  AlkPhos  124[H]  12-31          Culture - Blood (collected 12-28-24 @ 18:10)  Source: .Blood BLOOD  Preliminary Report (12-31-24 @ 01:01):    No growth at 48 Hours    Culture - Blood (collected 12-28-24 @ 12:15)  Source: .Blood BLOOD  Preliminary Report (12-30-24 @ 18:02):    No growth at 48 Hours    Culture - Body Fluid with Gram Stain (collected 12-26-24 @ 19:36)  Source: Body Fluid  Gram Stain (12-28-24 @ 20:11):    polymorphonuclear leukocytes seen    No organisms seen    by cytocentrifuge  Preliminary Report (12-28-24 @ 20:11):    Rare Enterococcus faecalis  Organism: Enterococcus faecalis (12-29-24 @ 15:59)  Organism: Enterococcus faecalis (12-29-24 @ 15:59)    Culture - Body Fluid with Gram Stain (collected 12-26-24 @ 19:33)  Source: Body Fluid  Gram Stain (12-27-24 @ 07:00):    polymorphonuclear leukocytes seen    Gram positive cocci in pairs seen    by cytocentrifuge  Preliminary Report (12-28-24 @ 09:48):    Rare Enterococcus faecalis  Organism: Enterococcus faecalis (12-29-24 @ 07:55)  Organism: Enterococcus faecalis (12-29-24 @ 07:55)        ROS: Unable to assess patient is lethargic, s/p tracheostomy    PHYSICAL EXAM:   Constitutional: trach to vent, awake  Eyes:  PERRLA  ENMT: nc/at, no thrush   Neck: supple, trach   Respiratory: CTA B/L  Cardiovascular: RRR  Gastrointestinal: incision clean/dry/intact + Ostomy pink output in bag, wound vac, ALYSSA x2 SS fluid  Genitourinary: +scrotal edema w/ large fluid collection; black discoloration   Extremities: SCD's in place and working bilaterally, + LE edema  Neurological: trach to vent    Skin: no rashes, ulcerations, lesions

## 2025-01-01 ENCOUNTER — RESULT REVIEW (OUTPATIENT)
Age: 75
End: 2025-01-01

## 2025-01-01 VITALS — RESPIRATION RATE: 12 BRPM

## 2025-01-01 DIAGNOSIS — E87.8 OTHER DISORDERS OF ELECTROLYTE AND FLUID BALANCE, NOT ELSEWHERE CLASSIFIED: ICD-10-CM

## 2025-01-01 LAB
-  AMPICILLIN/SULBACTAM: SIGNIFICANT CHANGE UP
-  AMPICILLIN: SIGNIFICANT CHANGE UP
-  AZTREONAM: SIGNIFICANT CHANGE UP
-  BLOOD PCR PANEL: SIGNIFICANT CHANGE UP
-  CEFAZOLIN: SIGNIFICANT CHANGE UP
-  CEFEPIME: SIGNIFICANT CHANGE UP
-  CEFIDEROCOL: SIGNIFICANT CHANGE UP
-  CEFOXITIN: SIGNIFICANT CHANGE UP
-  CEFTAZIDIME/AVIBACTAM: 8 — SIGNIFICANT CHANGE UP
-  CEFTRIAXONE: SIGNIFICANT CHANGE UP
-  CIPROFLOXACIN: SIGNIFICANT CHANGE UP
-  ERTAPENEM: SIGNIFICANT CHANGE UP
-  GENTAMICIN: SIGNIFICANT CHANGE UP
-  IMIPENEM: SIGNIFICANT CHANGE UP
-  LEVOFLOXACIN: SIGNIFICANT CHANGE UP
-  MEROPENEM: SIGNIFICANT CHANGE UP
-  MINOCYCLINE: SIGNIFICANT CHANGE UP
-  PIPERACILLIN/TAZOBACTAM: SIGNIFICANT CHANGE UP
-  STENOTROPHOMONAS MALTOPHILIA: SIGNIFICANT CHANGE UP
-  STENOTROPHOMONAS MALTOPHILIA: SIGNIFICANT CHANGE UP
-  TOBRAMYCIN: SIGNIFICANT CHANGE UP
-  TRIMETHOPRIM/SULFAMETHOXAZOLE: SIGNIFICANT CHANGE UP
ADD ON TEST-SPECIMEN IN LAB: SIGNIFICANT CHANGE UP
ALBUMIN SERPL ELPH-MCNC: 2.2 G/DL — LOW (ref 3.3–5)
ALBUMIN SERPL ELPH-MCNC: 2.6 G/DL — LOW (ref 3.3–5)
ALBUMIN SERPL ELPH-MCNC: 2.7 G/DL — LOW (ref 3.3–5)
ALBUMIN SERPL ELPH-MCNC: 2.8 G/DL — LOW (ref 3.3–5)
ALBUMIN SERPL ELPH-MCNC: 2.9 G/DL — LOW (ref 3.3–5)
ALBUMIN SERPL ELPH-MCNC: 3 G/DL — LOW (ref 3.3–5)
ALBUMIN SERPL ELPH-MCNC: 3.1 G/DL — LOW (ref 3.3–5)
ALBUMIN SERPL ELPH-MCNC: 3.2 G/DL — LOW (ref 3.3–5)
ALBUMIN SERPL ELPH-MCNC: 3.3 G/DL — SIGNIFICANT CHANGE UP (ref 3.3–5)
ALBUMIN SERPL ELPH-MCNC: 3.4 G/DL — SIGNIFICANT CHANGE UP (ref 3.3–5)
ALBUMIN SERPL ELPH-MCNC: 3.5 G/DL — SIGNIFICANT CHANGE UP (ref 3.3–5)
ALBUMIN SERPL ELPH-MCNC: 3.6 G/DL — SIGNIFICANT CHANGE UP (ref 3.3–5)
ALBUMIN SERPL ELPH-MCNC: 3.7 G/DL — SIGNIFICANT CHANGE UP (ref 3.3–5)
ALBUMIN SERPL ELPH-MCNC: 3.7 G/DL — SIGNIFICANT CHANGE UP (ref 3.3–5)
ALBUMIN SERPL ELPH-MCNC: 3.8 G/DL — SIGNIFICANT CHANGE UP (ref 3.3–5)
ALBUMIN SERPL ELPH-MCNC: 3.9 G/DL — SIGNIFICANT CHANGE UP (ref 3.3–5)
ALP SERPL-CCNC: 100 U/L — SIGNIFICANT CHANGE UP (ref 40–120)
ALP SERPL-CCNC: 104 U/L — SIGNIFICANT CHANGE UP (ref 40–120)
ALP SERPL-CCNC: 104 U/L — SIGNIFICANT CHANGE UP (ref 40–120)
ALP SERPL-CCNC: 105 U/L — SIGNIFICANT CHANGE UP (ref 40–120)
ALP SERPL-CCNC: 106 U/L — SIGNIFICANT CHANGE UP (ref 40–120)
ALP SERPL-CCNC: 107 U/L — SIGNIFICANT CHANGE UP (ref 40–120)
ALP SERPL-CCNC: 108 U/L — SIGNIFICANT CHANGE UP (ref 40–120)
ALP SERPL-CCNC: 109 U/L — SIGNIFICANT CHANGE UP (ref 40–120)
ALP SERPL-CCNC: 113 U/L — SIGNIFICANT CHANGE UP (ref 40–120)
ALP SERPL-CCNC: 114 U/L — SIGNIFICANT CHANGE UP (ref 40–120)
ALP SERPL-CCNC: 119 U/L — SIGNIFICANT CHANGE UP (ref 40–120)
ALP SERPL-CCNC: 120 U/L — SIGNIFICANT CHANGE UP (ref 40–120)
ALP SERPL-CCNC: 121 U/L — HIGH (ref 40–120)
ALP SERPL-CCNC: 121 U/L — HIGH (ref 40–120)
ALP SERPL-CCNC: 125 U/L — HIGH (ref 40–120)
ALP SERPL-CCNC: 126 U/L — HIGH (ref 40–120)
ALP SERPL-CCNC: 127 U/L — HIGH (ref 40–120)
ALP SERPL-CCNC: 128 U/L — HIGH (ref 40–120)
ALP SERPL-CCNC: 130 U/L — HIGH (ref 40–120)
ALP SERPL-CCNC: 130 U/L — HIGH (ref 40–120)
ALP SERPL-CCNC: 131 U/L — HIGH (ref 40–120)
ALP SERPL-CCNC: 132 U/L — HIGH (ref 40–120)
ALP SERPL-CCNC: 136 U/L — HIGH (ref 40–120)
ALP SERPL-CCNC: 136 U/L — HIGH (ref 40–120)
ALP SERPL-CCNC: 140 U/L — HIGH (ref 40–120)
ALP SERPL-CCNC: 142 U/L — HIGH (ref 40–120)
ALP SERPL-CCNC: 149 U/L — HIGH (ref 40–120)
ALP SERPL-CCNC: 151 U/L — HIGH (ref 40–120)
ALP SERPL-CCNC: 152 U/L — HIGH (ref 40–120)
ALP SERPL-CCNC: 153 U/L — HIGH (ref 40–120)
ALP SERPL-CCNC: 154 U/L — HIGH (ref 40–120)
ALP SERPL-CCNC: 156 U/L — HIGH (ref 40–120)
ALP SERPL-CCNC: 162 U/L — HIGH (ref 40–120)
ALP SERPL-CCNC: 163 U/L — HIGH (ref 40–120)
ALP SERPL-CCNC: 163 U/L — HIGH (ref 40–120)
ALP SERPL-CCNC: 164 U/L — HIGH (ref 40–120)
ALP SERPL-CCNC: 165 U/L — HIGH (ref 40–120)
ALP SERPL-CCNC: 167 U/L — HIGH (ref 40–120)
ALP SERPL-CCNC: 168 U/L — HIGH (ref 40–120)
ALP SERPL-CCNC: 169 U/L — HIGH (ref 40–120)
ALP SERPL-CCNC: 170 U/L — HIGH (ref 40–120)
ALP SERPL-CCNC: 171 U/L — HIGH (ref 40–120)
ALP SERPL-CCNC: 173 U/L — HIGH (ref 40–120)
ALP SERPL-CCNC: 174 U/L — HIGH (ref 40–120)
ALP SERPL-CCNC: 175 U/L — HIGH (ref 40–120)
ALP SERPL-CCNC: 177 U/L — HIGH (ref 40–120)
ALP SERPL-CCNC: 182 U/L — HIGH (ref 40–120)
ALP SERPL-CCNC: 182 U/L — HIGH (ref 40–120)
ALP SERPL-CCNC: 60 U/L — SIGNIFICANT CHANGE UP (ref 40–120)
ALP SERPL-CCNC: 62 U/L — SIGNIFICANT CHANGE UP (ref 40–120)
ALP SERPL-CCNC: 62 U/L — SIGNIFICANT CHANGE UP (ref 40–120)
ALP SERPL-CCNC: 64 U/L — SIGNIFICANT CHANGE UP (ref 40–120)
ALP SERPL-CCNC: 65 U/L — SIGNIFICANT CHANGE UP (ref 40–120)
ALP SERPL-CCNC: 68 U/L — SIGNIFICANT CHANGE UP (ref 40–120)
ALP SERPL-CCNC: 69 U/L — SIGNIFICANT CHANGE UP (ref 40–120)
ALP SERPL-CCNC: 72 U/L — SIGNIFICANT CHANGE UP (ref 40–120)
ALP SERPL-CCNC: 73 U/L — SIGNIFICANT CHANGE UP (ref 40–120)
ALP SERPL-CCNC: 74 U/L — SIGNIFICANT CHANGE UP (ref 40–120)
ALP SERPL-CCNC: 75 U/L — SIGNIFICANT CHANGE UP (ref 40–120)
ALP SERPL-CCNC: 77 U/L — SIGNIFICANT CHANGE UP (ref 40–120)
ALP SERPL-CCNC: 78 U/L — SIGNIFICANT CHANGE UP (ref 40–120)
ALP SERPL-CCNC: 78 U/L — SIGNIFICANT CHANGE UP (ref 40–120)
ALP SERPL-CCNC: 79 U/L — SIGNIFICANT CHANGE UP (ref 40–120)
ALP SERPL-CCNC: 80 U/L — SIGNIFICANT CHANGE UP (ref 40–120)
ALP SERPL-CCNC: 81 U/L — SIGNIFICANT CHANGE UP (ref 40–120)
ALP SERPL-CCNC: 82 U/L — SIGNIFICANT CHANGE UP (ref 40–120)
ALP SERPL-CCNC: 83 U/L — SIGNIFICANT CHANGE UP (ref 40–120)
ALP SERPL-CCNC: 83 U/L — SIGNIFICANT CHANGE UP (ref 40–120)
ALP SERPL-CCNC: 84 U/L — SIGNIFICANT CHANGE UP (ref 40–120)
ALP SERPL-CCNC: 85 U/L — SIGNIFICANT CHANGE UP (ref 40–120)
ALP SERPL-CCNC: 86 U/L — SIGNIFICANT CHANGE UP (ref 40–120)
ALP SERPL-CCNC: 87 U/L — SIGNIFICANT CHANGE UP (ref 40–120)
ALP SERPL-CCNC: 89 U/L — SIGNIFICANT CHANGE UP (ref 40–120)
ALP SERPL-CCNC: 89 U/L — SIGNIFICANT CHANGE UP (ref 40–120)
ALP SERPL-CCNC: 91 U/L — SIGNIFICANT CHANGE UP (ref 40–120)
ALP SERPL-CCNC: 92 U/L — SIGNIFICANT CHANGE UP (ref 40–120)
ALP SERPL-CCNC: 94 U/L — SIGNIFICANT CHANGE UP (ref 40–120)
ALP SERPL-CCNC: 94 U/L — SIGNIFICANT CHANGE UP (ref 40–120)
ALP SERPL-CCNC: 98 U/L — SIGNIFICANT CHANGE UP (ref 40–120)
ALP SERPL-CCNC: 98 U/L — SIGNIFICANT CHANGE UP (ref 40–120)
ALP SERPL-CCNC: 99 U/L — SIGNIFICANT CHANGE UP (ref 40–120)
ALT FLD-CCNC: 103 U/L — HIGH (ref 10–45)
ALT FLD-CCNC: 106 U/L — HIGH (ref 10–45)
ALT FLD-CCNC: 111 U/L — HIGH (ref 10–45)
ALT FLD-CCNC: 112 U/L — HIGH (ref 10–45)
ALT FLD-CCNC: 113 U/L — HIGH (ref 10–45)
ALT FLD-CCNC: 17 U/L — SIGNIFICANT CHANGE UP (ref 10–45)
ALT FLD-CCNC: 18 U/L — SIGNIFICANT CHANGE UP (ref 10–45)
ALT FLD-CCNC: 185 U/L — HIGH (ref 10–45)
ALT FLD-CCNC: 19 U/L — SIGNIFICANT CHANGE UP (ref 10–45)
ALT FLD-CCNC: 192 U/L — HIGH (ref 10–45)
ALT FLD-CCNC: 193 U/L — HIGH (ref 10–45)
ALT FLD-CCNC: 194 U/L — HIGH (ref 10–45)
ALT FLD-CCNC: 20 U/L — SIGNIFICANT CHANGE UP (ref 10–45)
ALT FLD-CCNC: 20 U/L — SIGNIFICANT CHANGE UP (ref 10–45)
ALT FLD-CCNC: 201 U/L — HIGH (ref 10–45)
ALT FLD-CCNC: 206 U/L — HIGH (ref 10–45)
ALT FLD-CCNC: 21 U/L — SIGNIFICANT CHANGE UP (ref 10–45)
ALT FLD-CCNC: 210 U/L — HIGH (ref 10–45)
ALT FLD-CCNC: 22 U/L — SIGNIFICANT CHANGE UP (ref 10–45)
ALT FLD-CCNC: 23 U/L — SIGNIFICANT CHANGE UP (ref 10–45)
ALT FLD-CCNC: 237 U/L — HIGH (ref 10–45)
ALT FLD-CCNC: 237 U/L — HIGH (ref 10–45)
ALT FLD-CCNC: 238 U/L — HIGH (ref 10–45)
ALT FLD-CCNC: 24 U/L — SIGNIFICANT CHANGE UP (ref 10–45)
ALT FLD-CCNC: 246 U/L — HIGH (ref 10–45)
ALT FLD-CCNC: 25 U/L — SIGNIFICANT CHANGE UP (ref 10–45)
ALT FLD-CCNC: 26 U/L — SIGNIFICANT CHANGE UP (ref 10–45)
ALT FLD-CCNC: 27 U/L — SIGNIFICANT CHANGE UP (ref 10–45)
ALT FLD-CCNC: 28 U/L — SIGNIFICANT CHANGE UP (ref 10–45)
ALT FLD-CCNC: 29 U/L — SIGNIFICANT CHANGE UP (ref 10–45)
ALT FLD-CCNC: 30 U/L — SIGNIFICANT CHANGE UP (ref 10–45)
ALT FLD-CCNC: 31 U/L — SIGNIFICANT CHANGE UP (ref 10–45)
ALT FLD-CCNC: 31 U/L — SIGNIFICANT CHANGE UP (ref 10–45)
ALT FLD-CCNC: 32 U/L — SIGNIFICANT CHANGE UP (ref 10–45)
ALT FLD-CCNC: 32 U/L — SIGNIFICANT CHANGE UP (ref 10–45)
ALT FLD-CCNC: 33 U/L — SIGNIFICANT CHANGE UP (ref 10–45)
ALT FLD-CCNC: 33 U/L — SIGNIFICANT CHANGE UP (ref 10–45)
ALT FLD-CCNC: 36 U/L — SIGNIFICANT CHANGE UP (ref 10–45)
ALT FLD-CCNC: 37 U/L — SIGNIFICANT CHANGE UP (ref 10–45)
ALT FLD-CCNC: 42 U/L — SIGNIFICANT CHANGE UP (ref 10–45)
ALT FLD-CCNC: 43 U/L — SIGNIFICANT CHANGE UP (ref 10–45)
ALT FLD-CCNC: 44 U/L — SIGNIFICANT CHANGE UP (ref 10–45)
ALT FLD-CCNC: 44 U/L — SIGNIFICANT CHANGE UP (ref 10–45)
ALT FLD-CCNC: 49 U/L — HIGH (ref 10–45)
ALT FLD-CCNC: 67 U/L — HIGH (ref 10–45)
ALT FLD-CCNC: 74 U/L — HIGH (ref 10–45)
ALT FLD-CCNC: 75 U/L — HIGH (ref 10–45)
ALT FLD-CCNC: 76 U/L — HIGH (ref 10–45)
ALT FLD-CCNC: 76 U/L — HIGH (ref 10–45)
ALT FLD-CCNC: 80 U/L — HIGH (ref 10–45)
ALT FLD-CCNC: 82 U/L — HIGH (ref 10–45)
ALT FLD-CCNC: 82 U/L — HIGH (ref 10–45)
ALT FLD-CCNC: 92 U/L — HIGH (ref 10–45)
ALT FLD-CCNC: 93 U/L — HIGH (ref 10–45)
AMMONIA BLD-MCNC: 31 UMOL/L — SIGNIFICANT CHANGE UP (ref 11–55)
ANION GAP SERPL CALC-SCNC: 12 MMOL/L — SIGNIFICANT CHANGE UP (ref 5–17)
ANION GAP SERPL CALC-SCNC: 13 MMOL/L — SIGNIFICANT CHANGE UP (ref 5–17)
ANION GAP SERPL CALC-SCNC: 14 MMOL/L — SIGNIFICANT CHANGE UP (ref 5–17)
ANION GAP SERPL CALC-SCNC: 15 MMOL/L — SIGNIFICANT CHANGE UP (ref 5–17)
ANION GAP SERPL CALC-SCNC: 16 MMOL/L — SIGNIFICANT CHANGE UP (ref 5–17)
ANION GAP SERPL CALC-SCNC: 17 MMOL/L — SIGNIFICANT CHANGE UP (ref 5–17)
ANION GAP SERPL CALC-SCNC: 18 MMOL/L — HIGH (ref 5–17)
ANION GAP SERPL CALC-SCNC: 19 MMOL/L — HIGH (ref 5–17)
ANION GAP SERPL CALC-SCNC: 20 MMOL/L — HIGH (ref 5–17)
ANION GAP SERPL CALC-SCNC: 21 MMOL/L — HIGH (ref 5–17)
APTT BLD: 100.8 SEC — HIGH (ref 24.5–35.6)
APTT BLD: 31.3 SEC — SIGNIFICANT CHANGE UP (ref 24.5–35.6)
APTT BLD: 31.6 SEC — SIGNIFICANT CHANGE UP (ref 24.5–35.6)
APTT BLD: 32.3 SEC — SIGNIFICANT CHANGE UP (ref 24.5–35.6)
APTT BLD: 33 SEC — SIGNIFICANT CHANGE UP (ref 24.5–35.6)
APTT BLD: 33.8 SEC — SIGNIFICANT CHANGE UP (ref 24.5–35.6)
APTT BLD: 35 SEC — SIGNIFICANT CHANGE UP (ref 24.5–35.6)
APTT BLD: 35.2 SEC — SIGNIFICANT CHANGE UP (ref 24.5–35.6)
APTT BLD: 35.9 SEC — HIGH (ref 24.5–35.6)
APTT BLD: 35.9 SEC — HIGH (ref 24.5–35.6)
APTT BLD: 37.3 SEC — HIGH (ref 24.5–35.6)
APTT BLD: 37.7 SEC — HIGH (ref 24.5–35.6)
APTT BLD: 40.3 SEC — HIGH (ref 24.5–35.6)
APTT BLD: 41.5 SEC — HIGH (ref 24.5–35.6)
APTT BLD: 42.2 SEC — HIGH (ref 24.5–35.6)
APTT BLD: 43.2 SEC — HIGH (ref 24.5–35.6)
APTT BLD: 43.8 SEC — HIGH (ref 24.5–35.6)
APTT BLD: 44.6 SEC — HIGH (ref 24.5–35.6)
APTT BLD: 46.8 SEC — HIGH (ref 24.5–35.6)
APTT BLD: 48.1 SEC — HIGH (ref 24.5–35.6)
APTT BLD: 48.2 SEC — HIGH (ref 24.5–35.6)
APTT BLD: 48.3 SEC — HIGH (ref 24.5–35.6)
APTT BLD: 50.3 SEC — HIGH (ref 24.5–35.6)
APTT BLD: 52.1 SEC — HIGH (ref 24.5–35.6)
APTT BLD: 53.4 SEC — HIGH (ref 24.5–35.6)
APTT BLD: 54.6 SEC — HIGH (ref 24.5–35.6)
APTT BLD: 55.1 SEC — HIGH (ref 24.5–35.6)
APTT BLD: 55.6 SEC — HIGH (ref 24.5–35.6)
APTT BLD: 57.3 SEC — HIGH (ref 24.5–35.6)
APTT BLD: 57.7 SEC — HIGH (ref 24.5–35.6)
APTT BLD: 58.1 SEC — HIGH (ref 24.5–35.6)
APTT BLD: 59.2 SEC — HIGH (ref 24.5–35.6)
APTT BLD: 59.5 SEC — HIGH (ref 24.5–35.6)
APTT BLD: 59.9 SEC — HIGH (ref 24.5–35.6)
APTT BLD: 60.1 SEC — HIGH (ref 24.5–35.6)
APTT BLD: 60.2 SEC — HIGH (ref 24.5–35.6)
APTT BLD: 60.3 SEC — HIGH (ref 24.5–35.6)
APTT BLD: 63 SEC — HIGH (ref 24.5–35.6)
APTT BLD: 64 SEC — HIGH (ref 24.5–35.6)
APTT BLD: 64.2 SEC — HIGH (ref 24.5–35.6)
APTT BLD: 65.5 SEC — HIGH (ref 24.5–35.6)
APTT BLD: 66.2 SEC — HIGH (ref 24.5–35.6)
APTT BLD: 67.2 SEC — HIGH (ref 24.5–35.6)
APTT BLD: 67.2 SEC — HIGH (ref 24.5–35.6)
APTT BLD: 67.5 SEC — HIGH (ref 24.5–35.6)
APTT BLD: 67.8 SEC — HIGH (ref 24.5–35.6)
APTT BLD: 68.4 SEC — HIGH (ref 24.5–35.6)
APTT BLD: 69.9 SEC — HIGH (ref 24.5–35.6)
APTT BLD: 70.8 SEC — HIGH (ref 24.5–35.6)
APTT BLD: 71.1 SEC — HIGH (ref 24.5–35.6)
APTT BLD: 72 SEC — HIGH (ref 24.5–35.6)
APTT BLD: 75.4 SEC — HIGH (ref 24.5–35.6)
APTT BLD: 75.8 SEC — HIGH (ref 24.5–35.6)
APTT BLD: 84.8 SEC — HIGH (ref 24.5–35.6)
APTT BLD: 92.8 SEC — HIGH (ref 24.5–35.6)
AST SERPL-CCNC: 117 U/L — HIGH (ref 10–40)
AST SERPL-CCNC: 119 U/L — HIGH (ref 10–40)
AST SERPL-CCNC: 135 U/L — HIGH (ref 10–40)
AST SERPL-CCNC: 154 U/L — HIGH (ref 10–40)
AST SERPL-CCNC: 26 U/L — SIGNIFICANT CHANGE UP (ref 10–40)
AST SERPL-CCNC: 31 U/L — SIGNIFICANT CHANGE UP (ref 10–40)
AST SERPL-CCNC: 32 U/L — SIGNIFICANT CHANGE UP (ref 10–40)
AST SERPL-CCNC: 33 U/L — SIGNIFICANT CHANGE UP (ref 10–40)
AST SERPL-CCNC: 33 U/L — SIGNIFICANT CHANGE UP (ref 10–40)
AST SERPL-CCNC: 34 U/L — SIGNIFICANT CHANGE UP (ref 10–40)
AST SERPL-CCNC: 34 U/L — SIGNIFICANT CHANGE UP (ref 10–40)
AST SERPL-CCNC: 35 U/L — SIGNIFICANT CHANGE UP (ref 10–40)
AST SERPL-CCNC: 35 U/L — SIGNIFICANT CHANGE UP (ref 10–40)
AST SERPL-CCNC: 36 U/L — SIGNIFICANT CHANGE UP (ref 10–40)
AST SERPL-CCNC: 37 U/L — SIGNIFICANT CHANGE UP (ref 10–40)
AST SERPL-CCNC: 37 U/L — SIGNIFICANT CHANGE UP (ref 10–40)
AST SERPL-CCNC: 38 U/L — SIGNIFICANT CHANGE UP (ref 10–40)
AST SERPL-CCNC: 38 U/L — SIGNIFICANT CHANGE UP (ref 10–40)
AST SERPL-CCNC: 39 U/L — SIGNIFICANT CHANGE UP (ref 10–40)
AST SERPL-CCNC: 41 U/L — HIGH (ref 10–40)
AST SERPL-CCNC: 42 U/L — HIGH (ref 10–40)
AST SERPL-CCNC: 42 U/L — HIGH (ref 10–40)
AST SERPL-CCNC: 43 U/L — HIGH (ref 10–40)
AST SERPL-CCNC: 44 U/L — HIGH (ref 10–40)
AST SERPL-CCNC: 45 U/L — HIGH (ref 10–40)
AST SERPL-CCNC: 46 U/L — HIGH (ref 10–40)
AST SERPL-CCNC: 46 U/L — HIGH (ref 10–40)
AST SERPL-CCNC: 47 U/L — HIGH (ref 10–40)
AST SERPL-CCNC: 48 U/L — HIGH (ref 10–40)
AST SERPL-CCNC: 49 U/L — HIGH (ref 10–40)
AST SERPL-CCNC: 49 U/L — HIGH (ref 10–40)
AST SERPL-CCNC: 52 U/L — HIGH (ref 10–40)
AST SERPL-CCNC: 53 U/L — HIGH (ref 10–40)
AST SERPL-CCNC: 54 U/L — HIGH (ref 10–40)
AST SERPL-CCNC: 55 U/L — HIGH (ref 10–40)
AST SERPL-CCNC: 55 U/L — HIGH (ref 10–40)
AST SERPL-CCNC: 56 U/L — HIGH (ref 10–40)
AST SERPL-CCNC: 57 U/L — HIGH (ref 10–40)
AST SERPL-CCNC: 58 U/L — HIGH (ref 10–40)
AST SERPL-CCNC: 59 U/L — HIGH (ref 10–40)
AST SERPL-CCNC: 60 U/L — HIGH (ref 10–40)
AST SERPL-CCNC: 61 U/L — HIGH (ref 10–40)
AST SERPL-CCNC: 63 U/L — HIGH (ref 10–40)
AST SERPL-CCNC: 63 U/L — HIGH (ref 10–40)
AST SERPL-CCNC: 64 U/L — HIGH (ref 10–40)
AST SERPL-CCNC: 64 U/L — HIGH (ref 10–40)
AST SERPL-CCNC: 77 U/L — HIGH (ref 10–40)
AST SERPL-CCNC: 78 U/L — HIGH (ref 10–40)
AST SERPL-CCNC: 82 U/L — HIGH (ref 10–40)
AST SERPL-CCNC: 86 U/L — HIGH (ref 10–40)
AST SERPL-CCNC: 86 U/L — HIGH (ref 10–40)
AST SERPL-CCNC: 89 U/L — HIGH (ref 10–40)
AST SERPL-CCNC: 95 U/L — HIGH (ref 10–40)
BASOPHILS # BLD AUTO: 0 K/UL — SIGNIFICANT CHANGE UP (ref 0–0.2)
BASOPHILS # BLD AUTO: 0.01 K/UL — SIGNIFICANT CHANGE UP (ref 0–0.2)
BASOPHILS # BLD AUTO: 0.01 K/UL — SIGNIFICANT CHANGE UP (ref 0–0.2)
BASOPHILS # BLD AUTO: 0.02 K/UL — SIGNIFICANT CHANGE UP (ref 0–0.2)
BASOPHILS # BLD AUTO: 0.02 K/UL — SIGNIFICANT CHANGE UP (ref 0–0.2)
BASOPHILS NFR BLD AUTO: 0 % — SIGNIFICANT CHANGE UP (ref 0–2)
BASOPHILS NFR BLD AUTO: 1 % — SIGNIFICANT CHANGE UP (ref 0–2)
BASOPHILS NFR BLD AUTO: 1.5 % — SIGNIFICANT CHANGE UP (ref 0–2)
BASOPHILS NFR BLD AUTO: 1.7 % — SIGNIFICANT CHANGE UP (ref 0–2)
BASOPHILS NFR BLD AUTO: 3.6 % — HIGH (ref 0–2)
BILIRUB DIRECT SERPL-MCNC: >10 MG/DL — HIGH (ref 0–0.3)
BILIRUB SERPL-MCNC: 10.5 MG/DL — HIGH (ref 0.2–1.2)
BILIRUB SERPL-MCNC: 12.5 MG/DL — HIGH (ref 0.2–1.2)
BILIRUB SERPL-MCNC: 13.1 MG/DL — HIGH (ref 0.2–1.2)
BILIRUB SERPL-MCNC: 13.2 MG/DL — HIGH (ref 0.2–1.2)
BILIRUB SERPL-MCNC: 13.4 MG/DL — HIGH (ref 0.2–1.2)
BILIRUB SERPL-MCNC: 14.1 MG/DL — HIGH (ref 0.2–1.2)
BILIRUB SERPL-MCNC: 14.1 MG/DL — HIGH (ref 0.2–1.2)
BILIRUB SERPL-MCNC: 15 MG/DL — HIGH (ref 0.2–1.2)
BILIRUB SERPL-MCNC: 15.1 MG/DL — HIGH (ref 0.2–1.2)
BILIRUB SERPL-MCNC: 15.3 MG/DL — HIGH (ref 0.2–1.2)
BILIRUB SERPL-MCNC: 15.5 MG/DL — HIGH (ref 0.2–1.2)
BILIRUB SERPL-MCNC: 16 MG/DL — HIGH (ref 0.2–1.2)
BILIRUB SERPL-MCNC: 16.2 MG/DL — HIGH (ref 0.2–1.2)
BILIRUB SERPL-MCNC: 16.2 MG/DL — HIGH (ref 0.2–1.2)
BILIRUB SERPL-MCNC: 16.4 MG/DL — HIGH (ref 0.2–1.2)
BILIRUB SERPL-MCNC: 16.6 MG/DL — HIGH (ref 0.2–1.2)
BILIRUB SERPL-MCNC: 17.2 MG/DL — HIGH (ref 0.2–1.2)
BILIRUB SERPL-MCNC: 17.2 MG/DL — HIGH (ref 0.2–1.2)
BILIRUB SERPL-MCNC: 17.5 MG/DL — HIGH (ref 0.2–1.2)
BILIRUB SERPL-MCNC: 17.8 MG/DL — HIGH (ref 0.2–1.2)
BILIRUB SERPL-MCNC: 18.1 MG/DL — HIGH (ref 0.2–1.2)
BILIRUB SERPL-MCNC: 18.6 MG/DL — HIGH (ref 0.2–1.2)
BILIRUB SERPL-MCNC: 19.9 MG/DL — HIGH (ref 0.2–1.2)
BILIRUB SERPL-MCNC: 20.4 MG/DL — HIGH (ref 0.2–1.2)
BILIRUB SERPL-MCNC: 21.3 MG/DL — HIGH (ref 0.2–1.2)
BILIRUB SERPL-MCNC: 21.4 MG/DL — HIGH (ref 0.2–1.2)
BILIRUB SERPL-MCNC: 21.6 MG/DL — HIGH (ref 0.2–1.2)
BILIRUB SERPL-MCNC: 21.9 MG/DL — HIGH (ref 0.2–1.2)
BILIRUB SERPL-MCNC: 22 MG/DL — HIGH (ref 0.2–1.2)
BILIRUB SERPL-MCNC: 22.2 MG/DL — HIGH (ref 0.2–1.2)
BILIRUB SERPL-MCNC: 22.4 MG/DL — HIGH (ref 0.2–1.2)
BILIRUB SERPL-MCNC: 22.4 MG/DL — HIGH (ref 0.2–1.2)
BILIRUB SERPL-MCNC: 24.4 MG/DL — HIGH (ref 0.2–1.2)
BILIRUB SERPL-MCNC: 24.4 MG/DL — HIGH (ref 0.2–1.2)
BILIRUB SERPL-MCNC: 24.6 MG/DL — HIGH (ref 0.2–1.2)
BILIRUB SERPL-MCNC: 25.2 MG/DL — HIGH (ref 0.2–1.2)
BILIRUB SERPL-MCNC: 25.2 MG/DL — HIGH (ref 0.2–1.2)
BILIRUB SERPL-MCNC: 25.3 MG/DL — HIGH (ref 0.2–1.2)
BILIRUB SERPL-MCNC: 25.4 MG/DL — HIGH (ref 0.2–1.2)
BILIRUB SERPL-MCNC: 25.5 MG/DL — HIGH (ref 0.2–1.2)
BILIRUB SERPL-MCNC: 25.5 MG/DL — HIGH (ref 0.2–1.2)
BILIRUB SERPL-MCNC: 25.6 MG/DL — HIGH (ref 0.2–1.2)
BILIRUB SERPL-MCNC: 25.8 MG/DL — HIGH (ref 0.2–1.2)
BILIRUB SERPL-MCNC: 25.9 MG/DL — HIGH (ref 0.2–1.2)
BILIRUB SERPL-MCNC: 25.9 MG/DL — HIGH (ref 0.2–1.2)
BILIRUB SERPL-MCNC: 26.1 MG/DL — HIGH (ref 0.2–1.2)
BILIRUB SERPL-MCNC: 26.1 MG/DL — HIGH (ref 0.2–1.2)
BILIRUB SERPL-MCNC: 26.6 MG/DL — HIGH (ref 0.2–1.2)
BILIRUB SERPL-MCNC: 26.7 MG/DL — HIGH (ref 0.2–1.2)
BILIRUB SERPL-MCNC: 26.9 MG/DL — HIGH (ref 0.2–1.2)
BILIRUB SERPL-MCNC: 27 MG/DL — HIGH (ref 0.2–1.2)
BILIRUB SERPL-MCNC: 27.3 MG/DL — HIGH (ref 0.2–1.2)
BILIRUB SERPL-MCNC: 27.3 MG/DL — HIGH (ref 0.2–1.2)
BILIRUB SERPL-MCNC: 27.4 MG/DL — HIGH (ref 0.2–1.2)
BILIRUB SERPL-MCNC: 27.5 MG/DL — HIGH (ref 0.2–1.2)
BILIRUB SERPL-MCNC: 27.6 MG/DL — HIGH (ref 0.2–1.2)
BILIRUB SERPL-MCNC: 27.7 MG/DL — HIGH (ref 0.2–1.2)
BILIRUB SERPL-MCNC: 27.7 MG/DL — HIGH (ref 0.2–1.2)
BILIRUB SERPL-MCNC: 27.8 MG/DL — HIGH (ref 0.2–1.2)
BILIRUB SERPL-MCNC: 28 MG/DL — HIGH (ref 0.2–1.2)
BILIRUB SERPL-MCNC: 28 MG/DL — HIGH (ref 0.2–1.2)
BILIRUB SERPL-MCNC: 28.1 MG/DL — HIGH (ref 0.2–1.2)
BILIRUB SERPL-MCNC: 28.3 MG/DL — HIGH (ref 0.2–1.2)
BILIRUB SERPL-MCNC: 28.4 MG/DL — HIGH (ref 0.2–1.2)
BILIRUB SERPL-MCNC: 28.5 MG/DL — HIGH (ref 0.2–1.2)
BILIRUB SERPL-MCNC: 28.7 MG/DL — HIGH (ref 0.2–1.2)
BILIRUB SERPL-MCNC: 28.7 MG/DL — HIGH (ref 0.2–1.2)
BILIRUB SERPL-MCNC: 28.8 MG/DL — HIGH (ref 0.2–1.2)
BILIRUB SERPL-MCNC: 28.8 MG/DL — HIGH (ref 0.2–1.2)
BILIRUB SERPL-MCNC: 28.9 MG/DL — HIGH (ref 0.2–1.2)
BILIRUB SERPL-MCNC: 29.5 MG/DL — HIGH (ref 0.2–1.2)
BILIRUB SERPL-MCNC: 29.7 MG/DL — HIGH (ref 0.2–1.2)
BILIRUB SERPL-MCNC: 29.9 MG/DL — HIGH (ref 0.2–1.2)
BILIRUB SERPL-MCNC: 30.2 MG/DL — HIGH (ref 0.2–1.2)
BILIRUB SERPL-MCNC: 30.2 MG/DL — HIGH (ref 0.2–1.2)
BILIRUB SERPL-MCNC: 30.4 MG/DL — HIGH (ref 0.2–1.2)
BILIRUB SERPL-MCNC: 30.7 MG/DL — HIGH (ref 0.2–1.2)
BILIRUB SERPL-MCNC: 30.8 MG/DL — HIGH (ref 0.2–1.2)
BILIRUB SERPL-MCNC: 30.8 MG/DL — HIGH (ref 0.2–1.2)
BILIRUB SERPL-MCNC: 31 MG/DL — HIGH (ref 0.2–1.2)
BILIRUB SERPL-MCNC: 8.6 MG/DL — HIGH (ref 0.2–1.2)
BILIRUB SERPL-MCNC: 9.1 MG/DL — HIGH (ref 0.2–1.2)
BILIRUB SERPL-MCNC: 9.9 MG/DL — HIGH (ref 0.2–1.2)
BLD GP AB SCN SERPL QL: NEGATIVE — SIGNIFICANT CHANGE UP
BUN SERPL-MCNC: 16 MG/DL — SIGNIFICANT CHANGE UP (ref 7–23)
BUN SERPL-MCNC: 17 MG/DL — SIGNIFICANT CHANGE UP (ref 7–23)
BUN SERPL-MCNC: 18 MG/DL — SIGNIFICANT CHANGE UP (ref 7–23)
BUN SERPL-MCNC: 19 MG/DL — SIGNIFICANT CHANGE UP (ref 7–23)
BUN SERPL-MCNC: 19 MG/DL — SIGNIFICANT CHANGE UP (ref 7–23)
BUN SERPL-MCNC: 20 MG/DL — SIGNIFICANT CHANGE UP (ref 7–23)
BUN SERPL-MCNC: 21 MG/DL — SIGNIFICANT CHANGE UP (ref 7–23)
BUN SERPL-MCNC: 22 MG/DL — SIGNIFICANT CHANGE UP (ref 7–23)
BUN SERPL-MCNC: 23 MG/DL — SIGNIFICANT CHANGE UP (ref 7–23)
BUN SERPL-MCNC: 25 MG/DL — HIGH (ref 7–23)
BUN SERPL-MCNC: 26 MG/DL — HIGH (ref 7–23)
BUN SERPL-MCNC: 26 MG/DL — HIGH (ref 7–23)
BUN SERPL-MCNC: 27 MG/DL — HIGH (ref 7–23)
BUN SERPL-MCNC: 28 MG/DL — HIGH (ref 7–23)
BUN SERPL-MCNC: 29 MG/DL — HIGH (ref 7–23)
BUN SERPL-MCNC: 30 MG/DL — HIGH (ref 7–23)
BUN SERPL-MCNC: 31 MG/DL — HIGH (ref 7–23)
BUN SERPL-MCNC: 32 MG/DL — HIGH (ref 7–23)
BUN SERPL-MCNC: 33 MG/DL — HIGH (ref 7–23)
BUN SERPL-MCNC: 33 MG/DL — HIGH (ref 7–23)
BUN SERPL-MCNC: 34 MG/DL — HIGH (ref 7–23)
BUN SERPL-MCNC: 35 MG/DL — HIGH (ref 7–23)
BUN SERPL-MCNC: 35 MG/DL — HIGH (ref 7–23)
BUN SERPL-MCNC: 36 MG/DL — HIGH (ref 7–23)
BUN SERPL-MCNC: 36 MG/DL — HIGH (ref 7–23)
BUN SERPL-MCNC: 37 MG/DL — HIGH (ref 7–23)
BUN SERPL-MCNC: 39 MG/DL — HIGH (ref 7–23)
BUN SERPL-MCNC: 40 MG/DL — HIGH (ref 7–23)
BUN SERPL-MCNC: 41 MG/DL — HIGH (ref 7–23)
BUN SERPL-MCNC: 42 MG/DL — HIGH (ref 7–23)
BUN SERPL-MCNC: 42 MG/DL — HIGH (ref 7–23)
BUN SERPL-MCNC: 43 MG/DL — HIGH (ref 7–23)
BUN SERPL-MCNC: 44 MG/DL — HIGH (ref 7–23)
BUN SERPL-MCNC: 44 MG/DL — HIGH (ref 7–23)
BUN SERPL-MCNC: 45 MG/DL — HIGH (ref 7–23)
BUN SERPL-MCNC: 48 MG/DL — HIGH (ref 7–23)
BUN SERPL-MCNC: 49 MG/DL — HIGH (ref 7–23)
BUN SERPL-MCNC: 51 MG/DL — HIGH (ref 7–23)
BUN SERPL-MCNC: 57 MG/DL — HIGH (ref 7–23)
BUN SERPL-MCNC: 58 MG/DL — HIGH (ref 7–23)
C DIFF GDH STL QL: NEGATIVE — SIGNIFICANT CHANGE UP
C DIFF GDH STL QL: SIGNIFICANT CHANGE UP
CALCIUM SERPL-MCNC: 5.8 MG/DL — CRITICAL LOW (ref 8.4–10.5)
CALCIUM SERPL-MCNC: 7.8 MG/DL — LOW (ref 8.4–10.5)
CALCIUM SERPL-MCNC: 7.9 MG/DL — LOW (ref 8.4–10.5)
CALCIUM SERPL-MCNC: 8 MG/DL — LOW (ref 8.4–10.5)
CALCIUM SERPL-MCNC: 8 MG/DL — LOW (ref 8.4–10.5)
CALCIUM SERPL-MCNC: 8.1 MG/DL — LOW (ref 8.4–10.5)
CALCIUM SERPL-MCNC: 8.2 MG/DL — LOW (ref 8.4–10.5)
CALCIUM SERPL-MCNC: 8.3 MG/DL — LOW (ref 8.4–10.5)
CALCIUM SERPL-MCNC: 8.4 MG/DL — SIGNIFICANT CHANGE UP (ref 8.4–10.5)
CALCIUM SERPL-MCNC: 8.5 MG/DL — SIGNIFICANT CHANGE UP (ref 8.4–10.5)
CALCIUM SERPL-MCNC: 8.6 MG/DL — SIGNIFICANT CHANGE UP (ref 8.4–10.5)
CALCIUM SERPL-MCNC: 8.7 MG/DL — SIGNIFICANT CHANGE UP (ref 8.4–10.5)
CALCIUM SERPL-MCNC: 8.8 MG/DL — SIGNIFICANT CHANGE UP (ref 8.4–10.5)
CALCIUM SERPL-MCNC: 8.9 MG/DL — SIGNIFICANT CHANGE UP (ref 8.4–10.5)
CALCIUM SERPL-MCNC: 9 MG/DL — SIGNIFICANT CHANGE UP (ref 8.4–10.5)
CALCIUM SERPL-MCNC: 9.2 MG/DL — SIGNIFICANT CHANGE UP (ref 8.4–10.5)
CALCIUM SERPL-MCNC: 9.2 MG/DL — SIGNIFICANT CHANGE UP (ref 8.4–10.5)
CALCIUM SERPL-MCNC: 9.3 MG/DL — SIGNIFICANT CHANGE UP (ref 8.4–10.5)
CALCIUM SERPL-MCNC: 9.3 MG/DL — SIGNIFICANT CHANGE UP (ref 8.4–10.5)
CALCIUM SERPL-MCNC: 9.6 MG/DL — SIGNIFICANT CHANGE UP (ref 8.4–10.5)
CALCIUM SERPL-MCNC: 9.8 MG/DL — SIGNIFICANT CHANGE UP (ref 8.4–10.5)
CHLORIDE SERPL-SCNC: 100 MMOL/L — SIGNIFICANT CHANGE UP (ref 96–108)
CHLORIDE SERPL-SCNC: 101 MMOL/L — SIGNIFICANT CHANGE UP (ref 96–108)
CHLORIDE SERPL-SCNC: 102 MMOL/L — SIGNIFICANT CHANGE UP (ref 96–108)
CHLORIDE SERPL-SCNC: 103 MMOL/L — SIGNIFICANT CHANGE UP (ref 96–108)
CHLORIDE SERPL-SCNC: 104 MMOL/L — SIGNIFICANT CHANGE UP (ref 96–108)
CHLORIDE SERPL-SCNC: 105 MMOL/L — SIGNIFICANT CHANGE UP (ref 96–108)
CHLORIDE SERPL-SCNC: 106 MMOL/L — SIGNIFICANT CHANGE UP (ref 96–108)
CHLORIDE SERPL-SCNC: 107 MMOL/L — SIGNIFICANT CHANGE UP (ref 96–108)
CHLORIDE SERPL-SCNC: 108 MMOL/L — SIGNIFICANT CHANGE UP (ref 96–108)
CHLORIDE SERPL-SCNC: 114 MMOL/L — HIGH (ref 96–108)
CHLORIDE SERPL-SCNC: 99 MMOL/L — SIGNIFICANT CHANGE UP (ref 96–108)
CK MB CFR SERPL CALC: 10.4 NG/ML — HIGH (ref 0–6.7)
CMV DNA CSF QL NAA+PROBE: 1240 IU/ML — HIGH
CMV DNA CSF QL NAA+PROBE: 1280 IU/ML — HIGH
CMV DNA CSF QL NAA+PROBE: 537 IU/ML — HIGH
CMV DNA CSF QL NAA+PROBE: 611 IU/ML — HIGH
CMV DNA CSF QL NAA+PROBE: 721 IU/ML — HIGH
CMV DNA SPEC NAA+PROBE-LOG#: 2.73 LOG10IU/ML — HIGH
CMV DNA SPEC NAA+PROBE-LOG#: 2.79 LOG10IU/ML — HIGH
CMV DNA SPEC NAA+PROBE-LOG#: 2.86 LOG10IU/ML — HIGH
CMV DNA SPEC NAA+PROBE-LOG#: 3.09 LOG10IU/ML — HIGH
CMV DNA SPEC NAA+PROBE-LOG#: 3.11 LOG10IU/ML — HIGH
CO2 SERPL-SCNC: 13 MMOL/L — LOW (ref 22–31)
CO2 SERPL-SCNC: 15 MMOL/L — LOW (ref 22–31)
CO2 SERPL-SCNC: 16 MMOL/L — LOW (ref 22–31)
CO2 SERPL-SCNC: 17 MMOL/L — LOW (ref 22–31)
CO2 SERPL-SCNC: 18 MMOL/L — LOW (ref 22–31)
CO2 SERPL-SCNC: 19 MMOL/L — LOW (ref 22–31)
CO2 SERPL-SCNC: 20 MMOL/L — LOW (ref 22–31)
CO2 SERPL-SCNC: 21 MMOL/L — LOW (ref 22–31)
CO2 SERPL-SCNC: 22 MMOL/L — SIGNIFICANT CHANGE UP (ref 22–31)
CO2 SERPL-SCNC: <10 MMOL/L — CRITICAL LOW (ref 22–31)
CREAT SERPL-MCNC: 0.3 MG/DL — LOW (ref 0.5–1.3)
CREAT SERPL-MCNC: 0.32 MG/DL — LOW (ref 0.5–1.3)
CREAT SERPL-MCNC: 0.33 MG/DL — LOW (ref 0.5–1.3)
CREAT SERPL-MCNC: 0.35 MG/DL — LOW (ref 0.5–1.3)
CREAT SERPL-MCNC: 0.35 MG/DL — LOW (ref 0.5–1.3)
CREAT SERPL-MCNC: 0.36 MG/DL — LOW (ref 0.5–1.3)
CREAT SERPL-MCNC: 0.37 MG/DL — LOW (ref 0.5–1.3)
CREAT SERPL-MCNC: 0.38 MG/DL — LOW (ref 0.5–1.3)
CREAT SERPL-MCNC: 0.4 MG/DL — LOW (ref 0.5–1.3)
CREAT SERPL-MCNC: 0.41 MG/DL — LOW (ref 0.5–1.3)
CREAT SERPL-MCNC: 0.43 MG/DL — LOW (ref 0.5–1.3)
CREAT SERPL-MCNC: 0.44 MG/DL — LOW (ref 0.5–1.3)
CREAT SERPL-MCNC: 0.44 MG/DL — LOW (ref 0.5–1.3)
CREAT SERPL-MCNC: 0.49 MG/DL — LOW (ref 0.5–1.3)
CREAT SERPL-MCNC: 0.51 MG/DL — SIGNIFICANT CHANGE UP (ref 0.5–1.3)
CREAT SERPL-MCNC: 0.53 MG/DL — SIGNIFICANT CHANGE UP (ref 0.5–1.3)
CREAT SERPL-MCNC: 0.54 MG/DL — SIGNIFICANT CHANGE UP (ref 0.5–1.3)
CREAT SERPL-MCNC: 0.55 MG/DL — SIGNIFICANT CHANGE UP (ref 0.5–1.3)
CREAT SERPL-MCNC: 0.62 MG/DL — SIGNIFICANT CHANGE UP (ref 0.5–1.3)
CREAT SERPL-MCNC: <0.3 MG/DL — LOW (ref 0.5–1.3)
CRP SERPL-MCNC: 16 MG/L — HIGH (ref 0–4)
CRP SERPL-MCNC: 17 MG/L — HIGH (ref 0–4)
CRP SERPL-MCNC: 18 MG/L — HIGH (ref 0–4)
CRP SERPL-MCNC: 20 MG/L — HIGH (ref 0–4)
CRP SERPL-MCNC: 25 MG/L — HIGH (ref 0–4)
CRP SERPL-MCNC: 43 MG/L — HIGH (ref 0–4)
CRP SERPL-MCNC: 45 MG/L — HIGH (ref 0–4)
CRP SERPL-MCNC: 46 MG/L — HIGH (ref 0–4)
CRP SERPL-MCNC: 52 MG/L — HIGH (ref 0–4)
CRP SERPL-MCNC: 57 MG/L — HIGH (ref 0–4)
CRP SERPL-MCNC: 57 MG/L — HIGH (ref 0–4)
CRP SERPL-MCNC: 71 MG/L — HIGH (ref 0–4)
CRP SERPL-MCNC: 86 MG/L — HIGH (ref 0–4)
CULTURE RESULTS: ABNORMAL
CULTURE RESULTS: SIGNIFICANT CHANGE UP
CYCLOSPORINE SER-MCNC: 143 NG/ML — LOW (ref 150–400)
CYCLOSPORINE SER-MCNC: 42 NG/ML — LOW (ref 150–400)
CYCLOSPORINE SER-MCNC: 45 NG/ML — LOW (ref 150–400)
CYCLOSPORINE SER-MCNC: 46 NG/ML — LOW (ref 150–400)
CYCLOSPORINE SER-MCNC: 51 NG/ML — LOW (ref 150–400)
CYCLOSPORINE SER-MCNC: 52 NG/ML — LOW (ref 150–400)
CYCLOSPORINE SER-MCNC: 54 NG/ML — LOW (ref 150–400)
CYCLOSPORINE SER-MCNC: 65 NG/ML — LOW (ref 150–400)
CYCLOSPORINE SER-MCNC: 67 NG/ML — LOW (ref 150–400)
CYCLOSPORINE SER-MCNC: 69 NG/ML — LOW (ref 150–400)
CYCLOSPORINE SER-MCNC: 70 NG/ML — LOW (ref 150–400)
CYCLOSPORINE SER-MCNC: 71 NG/ML — LOW (ref 150–400)
CYCLOSPORINE SER-MCNC: 72 NG/ML — LOW (ref 150–400)
CYCLOSPORINE SER-MCNC: 75 NG/ML — LOW (ref 150–400)
CYCLOSPORINE SER-MCNC: 79 NG/ML — LOW (ref 150–400)
CYCLOSPORINE SER-MCNC: 82 NG/ML — LOW (ref 150–400)
CYCLOSPORINE SER-MCNC: 95 NG/ML — LOW (ref 150–400)
EGFR: 100 ML/MIN/1.73M2 — SIGNIFICANT CHANGE UP
EGFR: 104 ML/MIN/1.73M2 — SIGNIFICANT CHANGE UP
EGFR: 105 ML/MIN/1.73M2 — SIGNIFICANT CHANGE UP
EGFR: 105 ML/MIN/1.73M2 — SIGNIFICANT CHANGE UP
EGFR: 106 ML/MIN/1.73M2 — SIGNIFICANT CHANGE UP
EGFR: 108 ML/MIN/1.73M2 — SIGNIFICANT CHANGE UP
EGFR: 111 ML/MIN/1.73M2 — SIGNIFICANT CHANGE UP
EGFR: 111 ML/MIN/1.73M2 — SIGNIFICANT CHANGE UP
EGFR: 112 ML/MIN/1.73M2 — SIGNIFICANT CHANGE UP
EGFR: 114 ML/MIN/1.73M2 — SIGNIFICANT CHANGE UP
EGFR: 116 ML/MIN/1.73M2 — SIGNIFICANT CHANGE UP
EGFR: 117 ML/MIN/1.73M2 — SIGNIFICANT CHANGE UP
EGFR: 118 ML/MIN/1.73M2 — SIGNIFICANT CHANGE UP
EGFR: 119 ML/MIN/1.73M2 — SIGNIFICANT CHANGE UP
EGFR: 119 ML/MIN/1.73M2 — SIGNIFICANT CHANGE UP
EGFR: 121 ML/MIN/1.73M2 — SIGNIFICANT CHANGE UP
EGFR: 122 ML/MIN/1.73M2 — SIGNIFICANT CHANGE UP
EGFR: 125 ML/MIN/1.73M2 — SIGNIFICANT CHANGE UP
EOSINOPHIL # BLD AUTO: 0 K/UL — SIGNIFICANT CHANGE UP (ref 0–0.5)
EOSINOPHIL # BLD AUTO: 0.01 K/UL — SIGNIFICANT CHANGE UP (ref 0–0.5)
EOSINOPHIL # BLD AUTO: 0.04 K/UL — SIGNIFICANT CHANGE UP (ref 0–0.5)
EOSINOPHIL NFR BLD AUTO: 0 % — SIGNIFICANT CHANGE UP (ref 0–6)
EOSINOPHIL NFR BLD AUTO: 0.8 % — SIGNIFICANT CHANGE UP (ref 0–6)
EOSINOPHIL NFR BLD AUTO: 3 % — SIGNIFICANT CHANGE UP (ref 0–6)
FERRITIN SERPL-MCNC: 867 NG/ML — HIGH (ref 30–400)
FIBRINOGEN PPP-MCNC: 105 MG/DL — LOW (ref 200–445)
FIBRINOGEN PPP-MCNC: 111 MG/DL — LOW (ref 200–445)
FIBRINOGEN PPP-MCNC: 112 MG/DL — LOW (ref 200–445)
FIBRINOGEN PPP-MCNC: 112 MG/DL — LOW (ref 200–445)
FIBRINOGEN PPP-MCNC: 121 MG/DL — LOW (ref 200–445)
FIBRINOGEN PPP-MCNC: 121 MG/DL — LOW (ref 200–445)
FIBRINOGEN PPP-MCNC: 123 MG/DL — LOW (ref 200–445)
FIBRINOGEN PPP-MCNC: 124 MG/DL — LOW (ref 200–445)
FIBRINOGEN PPP-MCNC: 126 MG/DL — LOW (ref 200–445)
FIBRINOGEN PPP-MCNC: 127 MG/DL — LOW (ref 200–445)
FIBRINOGEN PPP-MCNC: 128 MG/DL — LOW (ref 200–445)
FIBRINOGEN PPP-MCNC: 130 MG/DL — LOW (ref 200–445)
FIBRINOGEN PPP-MCNC: 132 MG/DL — LOW (ref 200–445)
FIBRINOGEN PPP-MCNC: 144 MG/DL — LOW (ref 200–445)
FIBRINOGEN PPP-MCNC: 148 MG/DL — LOW (ref 200–445)
FIBRINOGEN PPP-MCNC: 160 MG/DL — LOW (ref 200–445)
FIBRINOGEN PPP-MCNC: 165 MG/DL — LOW (ref 200–445)
FIBRINOGEN PPP-MCNC: 175 MG/DL — LOW (ref 200–445)
FIBRINOGEN PPP-MCNC: 177 MG/DL — LOW (ref 200–445)
FIBRINOGEN PPP-MCNC: 181 MG/DL — LOW (ref 200–445)
FIBRINOGEN PPP-MCNC: 75 MG/DL — CRITICAL LOW (ref 200–445)
FIBRINOGEN PPP-MCNC: 88 MG/DL — CRITICAL LOW (ref 200–445)
FUNGITELL: >500 PG/ML — HIGH
GALACTOMANNAN AG SERPL-ACNC: 0.02 INDEX — SIGNIFICANT CHANGE UP (ref 0–0.49)
GAS PNL BLDA: SIGNIFICANT CHANGE UP
GAS PNL BLDV: SIGNIFICANT CHANGE UP
GI PCR PANEL: SIGNIFICANT CHANGE UP
GLUCOSE BLDC GLUCOMTR-MCNC: 100 MG/DL — HIGH (ref 70–99)
GLUCOSE BLDC GLUCOMTR-MCNC: 105 MG/DL — HIGH (ref 70–99)
GLUCOSE BLDC GLUCOMTR-MCNC: 105 MG/DL — HIGH (ref 70–99)
GLUCOSE BLDC GLUCOMTR-MCNC: 108 MG/DL — HIGH (ref 70–99)
GLUCOSE BLDC GLUCOMTR-MCNC: 111 MG/DL — HIGH (ref 70–99)
GLUCOSE BLDC GLUCOMTR-MCNC: 111 MG/DL — HIGH (ref 70–99)
GLUCOSE BLDC GLUCOMTR-MCNC: 112 MG/DL — HIGH (ref 70–99)
GLUCOSE BLDC GLUCOMTR-MCNC: 113 MG/DL — HIGH (ref 70–99)
GLUCOSE BLDC GLUCOMTR-MCNC: 113 MG/DL — HIGH (ref 70–99)
GLUCOSE BLDC GLUCOMTR-MCNC: 118 MG/DL — HIGH (ref 70–99)
GLUCOSE BLDC GLUCOMTR-MCNC: 119 MG/DL — HIGH (ref 70–99)
GLUCOSE BLDC GLUCOMTR-MCNC: 119 MG/DL — HIGH (ref 70–99)
GLUCOSE BLDC GLUCOMTR-MCNC: 122 MG/DL — HIGH (ref 70–99)
GLUCOSE BLDC GLUCOMTR-MCNC: 122 MG/DL — HIGH (ref 70–99)
GLUCOSE BLDC GLUCOMTR-MCNC: 123 MG/DL — HIGH (ref 70–99)
GLUCOSE BLDC GLUCOMTR-MCNC: 124 MG/DL — HIGH (ref 70–99)
GLUCOSE BLDC GLUCOMTR-MCNC: 125 MG/DL — HIGH (ref 70–99)
GLUCOSE BLDC GLUCOMTR-MCNC: 126 MG/DL — HIGH (ref 70–99)
GLUCOSE BLDC GLUCOMTR-MCNC: 129 MG/DL — HIGH (ref 70–99)
GLUCOSE BLDC GLUCOMTR-MCNC: 134 MG/DL — HIGH (ref 70–99)
GLUCOSE BLDC GLUCOMTR-MCNC: 135 MG/DL — HIGH (ref 70–99)
GLUCOSE BLDC GLUCOMTR-MCNC: 137 MG/DL — HIGH (ref 70–99)
GLUCOSE BLDC GLUCOMTR-MCNC: 138 MG/DL — HIGH (ref 70–99)
GLUCOSE BLDC GLUCOMTR-MCNC: 139 MG/DL — HIGH (ref 70–99)
GLUCOSE BLDC GLUCOMTR-MCNC: 140 MG/DL — HIGH (ref 70–99)
GLUCOSE BLDC GLUCOMTR-MCNC: 142 MG/DL — HIGH (ref 70–99)
GLUCOSE BLDC GLUCOMTR-MCNC: 146 MG/DL — HIGH (ref 70–99)
GLUCOSE BLDC GLUCOMTR-MCNC: 149 MG/DL — HIGH (ref 70–99)
GLUCOSE BLDC GLUCOMTR-MCNC: 149 MG/DL — HIGH (ref 70–99)
GLUCOSE BLDC GLUCOMTR-MCNC: 153 MG/DL — HIGH (ref 70–99)
GLUCOSE BLDC GLUCOMTR-MCNC: 159 MG/DL — HIGH (ref 70–99)
GLUCOSE BLDC GLUCOMTR-MCNC: 160 MG/DL — HIGH (ref 70–99)
GLUCOSE BLDC GLUCOMTR-MCNC: 161 MG/DL — HIGH (ref 70–99)
GLUCOSE BLDC GLUCOMTR-MCNC: 164 MG/DL — HIGH (ref 70–99)
GLUCOSE BLDC GLUCOMTR-MCNC: 165 MG/DL — HIGH (ref 70–99)
GLUCOSE BLDC GLUCOMTR-MCNC: 166 MG/DL — HIGH (ref 70–99)
GLUCOSE BLDC GLUCOMTR-MCNC: 169 MG/DL — HIGH (ref 70–99)
GLUCOSE BLDC GLUCOMTR-MCNC: 182 MG/DL — HIGH (ref 70–99)
GLUCOSE BLDC GLUCOMTR-MCNC: 182 MG/DL — HIGH (ref 70–99)
GLUCOSE BLDC GLUCOMTR-MCNC: 193 MG/DL — HIGH (ref 70–99)
GLUCOSE BLDC GLUCOMTR-MCNC: 194 MG/DL — HIGH (ref 70–99)
GLUCOSE BLDC GLUCOMTR-MCNC: 199 MG/DL — HIGH (ref 70–99)
GLUCOSE BLDC GLUCOMTR-MCNC: 201 MG/DL — HIGH (ref 70–99)
GLUCOSE BLDC GLUCOMTR-MCNC: 202 MG/DL — HIGH (ref 70–99)
GLUCOSE BLDC GLUCOMTR-MCNC: 203 MG/DL — HIGH (ref 70–99)
GLUCOSE BLDC GLUCOMTR-MCNC: 204 MG/DL — HIGH (ref 70–99)
GLUCOSE BLDC GLUCOMTR-MCNC: 206 MG/DL — HIGH (ref 70–99)
GLUCOSE BLDC GLUCOMTR-MCNC: 209 MG/DL — HIGH (ref 70–99)
GLUCOSE BLDC GLUCOMTR-MCNC: 212 MG/DL — HIGH (ref 70–99)
GLUCOSE BLDC GLUCOMTR-MCNC: 215 MG/DL — HIGH (ref 70–99)
GLUCOSE BLDC GLUCOMTR-MCNC: 216 MG/DL — HIGH (ref 70–99)
GLUCOSE BLDC GLUCOMTR-MCNC: 219 MG/DL — HIGH (ref 70–99)
GLUCOSE BLDC GLUCOMTR-MCNC: 225 MG/DL — HIGH (ref 70–99)
GLUCOSE BLDC GLUCOMTR-MCNC: 227 MG/DL — HIGH (ref 70–99)
GLUCOSE BLDC GLUCOMTR-MCNC: 228 MG/DL — HIGH (ref 70–99)
GLUCOSE BLDC GLUCOMTR-MCNC: 230 MG/DL — HIGH (ref 70–99)
GLUCOSE BLDC GLUCOMTR-MCNC: 235 MG/DL — HIGH (ref 70–99)
GLUCOSE BLDC GLUCOMTR-MCNC: 240 MG/DL — HIGH (ref 70–99)
GLUCOSE BLDC GLUCOMTR-MCNC: 244 MG/DL — HIGH (ref 70–99)
GLUCOSE BLDC GLUCOMTR-MCNC: 247 MG/DL — HIGH (ref 70–99)
GLUCOSE BLDC GLUCOMTR-MCNC: 257 MG/DL — HIGH (ref 70–99)
GLUCOSE BLDC GLUCOMTR-MCNC: 268 MG/DL — HIGH (ref 70–99)
GLUCOSE BLDC GLUCOMTR-MCNC: 269 MG/DL — HIGH (ref 70–99)
GLUCOSE BLDC GLUCOMTR-MCNC: 274 MG/DL — HIGH (ref 70–99)
GLUCOSE BLDC GLUCOMTR-MCNC: 281 MG/DL — HIGH (ref 70–99)
GLUCOSE BLDC GLUCOMTR-MCNC: 298 MG/DL — HIGH (ref 70–99)
GLUCOSE BLDC GLUCOMTR-MCNC: 300 MG/DL — HIGH (ref 70–99)
GLUCOSE BLDC GLUCOMTR-MCNC: 324 MG/DL — HIGH (ref 70–99)
GLUCOSE BLDC GLUCOMTR-MCNC: 346 MG/DL — HIGH (ref 70–99)
GLUCOSE BLDC GLUCOMTR-MCNC: 353 MG/DL — HIGH (ref 70–99)
GLUCOSE BLDC GLUCOMTR-MCNC: 353 MG/DL — HIGH (ref 70–99)
GLUCOSE BLDC GLUCOMTR-MCNC: 383 MG/DL — HIGH (ref 70–99)
GLUCOSE BLDC GLUCOMTR-MCNC: 69 MG/DL — LOW (ref 70–99)
GLUCOSE BLDC GLUCOMTR-MCNC: 78 MG/DL — SIGNIFICANT CHANGE UP (ref 70–99)
GLUCOSE BLDC GLUCOMTR-MCNC: 81 MG/DL — SIGNIFICANT CHANGE UP (ref 70–99)
GLUCOSE BLDC GLUCOMTR-MCNC: 82 MG/DL — SIGNIFICANT CHANGE UP (ref 70–99)
GLUCOSE BLDC GLUCOMTR-MCNC: 83 MG/DL — SIGNIFICANT CHANGE UP (ref 70–99)
GLUCOSE BLDC GLUCOMTR-MCNC: 87 MG/DL — SIGNIFICANT CHANGE UP (ref 70–99)
GLUCOSE BLDC GLUCOMTR-MCNC: 88 MG/DL — SIGNIFICANT CHANGE UP (ref 70–99)
GLUCOSE BLDC GLUCOMTR-MCNC: 88 MG/DL — SIGNIFICANT CHANGE UP (ref 70–99)
GLUCOSE BLDC GLUCOMTR-MCNC: 90 MG/DL — SIGNIFICANT CHANGE UP (ref 70–99)
GLUCOSE BLDC GLUCOMTR-MCNC: 96 MG/DL — SIGNIFICANT CHANGE UP (ref 70–99)
GLUCOSE BLDC GLUCOMTR-MCNC: 96 MG/DL — SIGNIFICANT CHANGE UP (ref 70–99)
GLUCOSE BLDC GLUCOMTR-MCNC: 97 MG/DL — SIGNIFICANT CHANGE UP (ref 70–99)
GLUCOSE BLDC GLUCOMTR-MCNC: 97 MG/DL — SIGNIFICANT CHANGE UP (ref 70–99)
GLUCOSE BLDC GLUCOMTR-MCNC: 99 MG/DL — SIGNIFICANT CHANGE UP (ref 70–99)
GLUCOSE SERPL-MCNC: 100 MG/DL — HIGH (ref 70–99)
GLUCOSE SERPL-MCNC: 101 MG/DL — HIGH (ref 70–99)
GLUCOSE SERPL-MCNC: 103 MG/DL — HIGH (ref 70–99)
GLUCOSE SERPL-MCNC: 104 MG/DL — HIGH (ref 70–99)
GLUCOSE SERPL-MCNC: 105 MG/DL — HIGH (ref 70–99)
GLUCOSE SERPL-MCNC: 105 MG/DL — HIGH (ref 70–99)
GLUCOSE SERPL-MCNC: 106 MG/DL — HIGH (ref 70–99)
GLUCOSE SERPL-MCNC: 111 MG/DL — HIGH (ref 70–99)
GLUCOSE SERPL-MCNC: 111 MG/DL — HIGH (ref 70–99)
GLUCOSE SERPL-MCNC: 112 MG/DL — HIGH (ref 70–99)
GLUCOSE SERPL-MCNC: 113 MG/DL — HIGH (ref 70–99)
GLUCOSE SERPL-MCNC: 114 MG/DL — HIGH (ref 70–99)
GLUCOSE SERPL-MCNC: 115 MG/DL — HIGH (ref 70–99)
GLUCOSE SERPL-MCNC: 116 MG/DL — HIGH (ref 70–99)
GLUCOSE SERPL-MCNC: 117 MG/DL — HIGH (ref 70–99)
GLUCOSE SERPL-MCNC: 117 MG/DL — HIGH (ref 70–99)
GLUCOSE SERPL-MCNC: 119 MG/DL — HIGH (ref 70–99)
GLUCOSE SERPL-MCNC: 122 MG/DL — HIGH (ref 70–99)
GLUCOSE SERPL-MCNC: 124 MG/DL — HIGH (ref 70–99)
GLUCOSE SERPL-MCNC: 124 MG/DL — HIGH (ref 70–99)
GLUCOSE SERPL-MCNC: 125 MG/DL — HIGH (ref 70–99)
GLUCOSE SERPL-MCNC: 127 MG/DL — HIGH (ref 70–99)
GLUCOSE SERPL-MCNC: 129 MG/DL — HIGH (ref 70–99)
GLUCOSE SERPL-MCNC: 129 MG/DL — HIGH (ref 70–99)
GLUCOSE SERPL-MCNC: 133 MG/DL — HIGH (ref 70–99)
GLUCOSE SERPL-MCNC: 134 MG/DL — HIGH (ref 70–99)
GLUCOSE SERPL-MCNC: 136 MG/DL — HIGH (ref 70–99)
GLUCOSE SERPL-MCNC: 136 MG/DL — HIGH (ref 70–99)
GLUCOSE SERPL-MCNC: 138 MG/DL — HIGH (ref 70–99)
GLUCOSE SERPL-MCNC: 139 MG/DL — HIGH (ref 70–99)
GLUCOSE SERPL-MCNC: 141 MG/DL — HIGH (ref 70–99)
GLUCOSE SERPL-MCNC: 144 MG/DL — HIGH (ref 70–99)
GLUCOSE SERPL-MCNC: 145 MG/DL — HIGH (ref 70–99)
GLUCOSE SERPL-MCNC: 146 MG/DL — HIGH (ref 70–99)
GLUCOSE SERPL-MCNC: 147 MG/DL — HIGH (ref 70–99)
GLUCOSE SERPL-MCNC: 147 MG/DL — HIGH (ref 70–99)
GLUCOSE SERPL-MCNC: 148 MG/DL — HIGH (ref 70–99)
GLUCOSE SERPL-MCNC: 149 MG/DL — HIGH (ref 70–99)
GLUCOSE SERPL-MCNC: 154 MG/DL — HIGH (ref 70–99)
GLUCOSE SERPL-MCNC: 156 MG/DL — HIGH (ref 70–99)
GLUCOSE SERPL-MCNC: 157 MG/DL — HIGH (ref 70–99)
GLUCOSE SERPL-MCNC: 157 MG/DL — HIGH (ref 70–99)
GLUCOSE SERPL-MCNC: 158 MG/DL — HIGH (ref 70–99)
GLUCOSE SERPL-MCNC: 159 MG/DL — HIGH (ref 70–99)
GLUCOSE SERPL-MCNC: 160 MG/DL — HIGH (ref 70–99)
GLUCOSE SERPL-MCNC: 165 MG/DL — HIGH (ref 70–99)
GLUCOSE SERPL-MCNC: 168 MG/DL — HIGH (ref 70–99)
GLUCOSE SERPL-MCNC: 171 MG/DL — HIGH (ref 70–99)
GLUCOSE SERPL-MCNC: 173 MG/DL — HIGH (ref 70–99)
GLUCOSE SERPL-MCNC: 179 MG/DL — HIGH (ref 70–99)
GLUCOSE SERPL-MCNC: 183 MG/DL — HIGH (ref 70–99)
GLUCOSE SERPL-MCNC: 185 MG/DL — HIGH (ref 70–99)
GLUCOSE SERPL-MCNC: 185 MG/DL — HIGH (ref 70–99)
GLUCOSE SERPL-MCNC: 187 MG/DL — HIGH (ref 70–99)
GLUCOSE SERPL-MCNC: 191 MG/DL — HIGH (ref 70–99)
GLUCOSE SERPL-MCNC: 195 MG/DL — HIGH (ref 70–99)
GLUCOSE SERPL-MCNC: 199 MG/DL — HIGH (ref 70–99)
GLUCOSE SERPL-MCNC: 201 MG/DL — HIGH (ref 70–99)
GLUCOSE SERPL-MCNC: 208 MG/DL — HIGH (ref 70–99)
GLUCOSE SERPL-MCNC: 208 MG/DL — HIGH (ref 70–99)
GLUCOSE SERPL-MCNC: 209 MG/DL — HIGH (ref 70–99)
GLUCOSE SERPL-MCNC: 213 MG/DL — HIGH (ref 70–99)
GLUCOSE SERPL-MCNC: 216 MG/DL — HIGH (ref 70–99)
GLUCOSE SERPL-MCNC: 218 MG/DL — HIGH (ref 70–99)
GLUCOSE SERPL-MCNC: 220 MG/DL — HIGH (ref 70–99)
GLUCOSE SERPL-MCNC: 222 MG/DL — HIGH (ref 70–99)
GLUCOSE SERPL-MCNC: 223 MG/DL — HIGH (ref 70–99)
GLUCOSE SERPL-MCNC: 229 MG/DL — HIGH (ref 70–99)
GLUCOSE SERPL-MCNC: 231 MG/DL — HIGH (ref 70–99)
GLUCOSE SERPL-MCNC: 236 MG/DL — HIGH (ref 70–99)
GLUCOSE SERPL-MCNC: 240 MG/DL — HIGH (ref 70–99)
GLUCOSE SERPL-MCNC: 244 MG/DL — HIGH (ref 70–99)
GLUCOSE SERPL-MCNC: 247 MG/DL — HIGH (ref 70–99)
GLUCOSE SERPL-MCNC: 250 MG/DL — HIGH (ref 70–99)
GLUCOSE SERPL-MCNC: 272 MG/DL — HIGH (ref 70–99)
GLUCOSE SERPL-MCNC: 273 MG/DL — HIGH (ref 70–99)
GLUCOSE SERPL-MCNC: 311 MG/DL — HIGH (ref 70–99)
GLUCOSE SERPL-MCNC: 327 MG/DL — HIGH (ref 70–99)
GLUCOSE SERPL-MCNC: 413 MG/DL — HIGH (ref 70–99)
GLUCOSE SERPL-MCNC: 66 MG/DL — LOW (ref 70–99)
GLUCOSE SERPL-MCNC: 79 MG/DL — SIGNIFICANT CHANGE UP (ref 70–99)
GLUCOSE SERPL-MCNC: 81 MG/DL — SIGNIFICANT CHANGE UP (ref 70–99)
GLUCOSE SERPL-MCNC: 82 MG/DL — SIGNIFICANT CHANGE UP (ref 70–99)
GLUCOSE SERPL-MCNC: 88 MG/DL — SIGNIFICANT CHANGE UP (ref 70–99)
GLUCOSE SERPL-MCNC: 90 MG/DL — SIGNIFICANT CHANGE UP (ref 70–99)
GLUCOSE SERPL-MCNC: 94 MG/DL — SIGNIFICANT CHANGE UP (ref 70–99)
GLUCOSE SERPL-MCNC: 96 MG/DL — SIGNIFICANT CHANGE UP (ref 70–99)
GLUCOSE SERPL-MCNC: 98 MG/DL — SIGNIFICANT CHANGE UP (ref 70–99)
GRAM STN FLD: ABNORMAL
HCT VFR BLD CALC: 18 % — CRITICAL LOW (ref 39–50)
HCT VFR BLD CALC: 19.8 % — CRITICAL LOW (ref 39–50)
HCT VFR BLD CALC: 20.4 % — CRITICAL LOW (ref 39–50)
HCT VFR BLD CALC: 20.6 % — CRITICAL LOW (ref 39–50)
HCT VFR BLD CALC: 22 % — LOW (ref 39–50)
HCT VFR BLD CALC: 22.1 % — LOW (ref 39–50)
HCT VFR BLD CALC: 22.2 % — LOW (ref 39–50)
HCT VFR BLD CALC: 22.3 % — LOW (ref 39–50)
HCT VFR BLD CALC: 22.8 % — LOW (ref 39–50)
HCT VFR BLD CALC: 23.1 % — LOW (ref 39–50)
HCT VFR BLD CALC: 23.3 % — LOW (ref 39–50)
HCT VFR BLD CALC: 23.4 % — LOW (ref 39–50)
HCT VFR BLD CALC: 23.6 % — LOW (ref 39–50)
HCT VFR BLD CALC: 23.6 % — LOW (ref 39–50)
HCT VFR BLD CALC: 23.8 % — LOW (ref 39–50)
HCT VFR BLD CALC: 23.9 % — LOW (ref 39–50)
HCT VFR BLD CALC: 24 % — LOW (ref 39–50)
HCT VFR BLD CALC: 24 % — LOW (ref 39–50)
HCT VFR BLD CALC: 24.1 % — LOW (ref 39–50)
HCT VFR BLD CALC: 24.1 % — LOW (ref 39–50)
HCT VFR BLD CALC: 24.2 % — LOW (ref 39–50)
HCT VFR BLD CALC: 24.4 % — LOW (ref 39–50)
HCT VFR BLD CALC: 24.4 % — LOW (ref 39–50)
HCT VFR BLD CALC: 24.5 % — LOW (ref 39–50)
HCT VFR BLD CALC: 24.5 % — LOW (ref 39–50)
HCT VFR BLD CALC: 24.7 % — LOW (ref 39–50)
HCT VFR BLD CALC: 24.8 % — LOW (ref 39–50)
HCT VFR BLD CALC: 24.8 % — LOW (ref 39–50)
HCT VFR BLD CALC: 24.9 % — LOW (ref 39–50)
HCT VFR BLD CALC: 24.9 % — LOW (ref 39–50)
HCT VFR BLD CALC: 25 % — LOW (ref 39–50)
HCT VFR BLD CALC: 25.1 % — LOW (ref 39–50)
HCT VFR BLD CALC: 25.2 % — LOW (ref 39–50)
HCT VFR BLD CALC: 25.4 % — LOW (ref 39–50)
HCT VFR BLD CALC: 25.4 % — LOW (ref 39–50)
HCT VFR BLD CALC: 25.5 % — LOW (ref 39–50)
HCT VFR BLD CALC: 25.6 % — LOW (ref 39–50)
HCT VFR BLD CALC: 25.7 % — LOW (ref 39–50)
HCT VFR BLD CALC: 25.7 % — LOW (ref 39–50)
HCT VFR BLD CALC: 25.8 % — LOW (ref 39–50)
HCT VFR BLD CALC: 25.8 % — LOW (ref 39–50)
HCT VFR BLD CALC: 25.9 % — LOW (ref 39–50)
HCT VFR BLD CALC: 26.1 % — LOW (ref 39–50)
HCT VFR BLD CALC: 26.2 % — LOW (ref 39–50)
HCT VFR BLD CALC: 26.3 % — LOW (ref 39–50)
HCT VFR BLD CALC: 26.4 % — LOW (ref 39–50)
HCT VFR BLD CALC: 26.5 % — LOW (ref 39–50)
HCT VFR BLD CALC: 26.6 % — LOW (ref 39–50)
HCT VFR BLD CALC: 26.6 % — LOW (ref 39–50)
HCT VFR BLD CALC: 26.7 % — LOW (ref 39–50)
HCT VFR BLD CALC: 26.9 % — LOW (ref 39–50)
HCT VFR BLD CALC: 27.1 % — LOW (ref 39–50)
HCT VFR BLD CALC: 27.2 % — LOW (ref 39–50)
HCT VFR BLD CALC: 27.2 % — LOW (ref 39–50)
HCT VFR BLD CALC: 27.3 % — LOW (ref 39–50)
HCT VFR BLD CALC: 27.3 % — LOW (ref 39–50)
HCT VFR BLD CALC: 27.4 % — LOW (ref 39–50)
HCT VFR BLD CALC: 27.5 % — LOW (ref 39–50)
HCT VFR BLD CALC: 27.8 % — LOW (ref 39–50)
HCT VFR BLD CALC: 27.8 % — LOW (ref 39–50)
HCT VFR BLD CALC: 27.9 % — LOW (ref 39–50)
HCT VFR BLD CALC: 28 % — LOW (ref 39–50)
HCT VFR BLD CALC: 29.1 % — LOW (ref 39–50)
HCT VFR BLD CALC: 30.3 % — LOW (ref 39–50)
HGB BLD-MCNC: 10.1 G/DL — LOW (ref 13–17)
HGB BLD-MCNC: 10.2 G/DL — LOW (ref 13–17)
HGB BLD-MCNC: 6.2 G/DL — CRITICAL LOW (ref 13–17)
HGB BLD-MCNC: 6.6 G/DL — CRITICAL LOW (ref 13–17)
HGB BLD-MCNC: 6.7 G/DL — CRITICAL LOW (ref 13–17)
HGB BLD-MCNC: 6.9 G/DL — CRITICAL LOW (ref 13–17)
HGB BLD-MCNC: 7.5 G/DL — LOW (ref 13–17)
HGB BLD-MCNC: 7.5 G/DL — LOW (ref 13–17)
HGB BLD-MCNC: 7.6 G/DL — LOW (ref 13–17)
HGB BLD-MCNC: 7.7 G/DL — LOW (ref 13–17)
HGB BLD-MCNC: 7.7 G/DL — LOW (ref 13–17)
HGB BLD-MCNC: 7.8 G/DL — LOW (ref 13–17)
HGB BLD-MCNC: 7.9 G/DL — LOW (ref 13–17)
HGB BLD-MCNC: 8 G/DL — LOW (ref 13–17)
HGB BLD-MCNC: 8.1 G/DL — LOW (ref 13–17)
HGB BLD-MCNC: 8.1 G/DL — LOW (ref 13–17)
HGB BLD-MCNC: 8.2 G/DL — LOW (ref 13–17)
HGB BLD-MCNC: 8.3 G/DL — LOW (ref 13–17)
HGB BLD-MCNC: 8.4 G/DL — LOW (ref 13–17)
HGB BLD-MCNC: 8.5 G/DL — LOW (ref 13–17)
HGB BLD-MCNC: 8.6 G/DL — LOW (ref 13–17)
HGB BLD-MCNC: 8.7 G/DL — LOW (ref 13–17)
HGB BLD-MCNC: 8.8 G/DL — LOW (ref 13–17)
HGB BLD-MCNC: 8.9 G/DL — LOW (ref 13–17)
HGB BLD-MCNC: 9 G/DL — LOW (ref 13–17)
HGB BLD-MCNC: 9.1 G/DL — LOW (ref 13–17)
HGB BLD-MCNC: 9.2 G/DL — LOW (ref 13–17)
HGB BLD-MCNC: 9.3 G/DL — LOW (ref 13–17)
HGB BLD-MCNC: 9.4 G/DL — LOW (ref 13–17)
HGB BLD-MCNC: 9.4 G/DL — LOW (ref 13–17)
HGB BLD-MCNC: 9.5 G/DL — LOW (ref 13–17)
HGB BLD-MCNC: 9.6 G/DL — LOW (ref 13–17)
HGB BLD-MCNC: 9.7 G/DL — LOW (ref 13–17)
HGB BLD-MCNC: 9.8 G/DL — LOW (ref 13–17)
HGB BLD-MCNC: 9.8 G/DL — LOW (ref 13–17)
HGB BLD-MCNC: 9.9 G/DL — LOW (ref 13–17)
IMM GRANULOCYTES NFR BLD AUTO: 1 % — HIGH (ref 0–0.9)
IMM GRANULOCYTES NFR BLD AUTO: 1.5 % — HIGH (ref 0–0.9)
IMM GRANULOCYTES NFR BLD AUTO: 68.5 % — HIGH (ref 0–0.9)
INR BLD: 1.5 RATIO — HIGH (ref 0.85–1.16)
INR BLD: 1.5 RATIO — HIGH (ref 0.85–1.16)
INR BLD: 1.55 RATIO — HIGH (ref 0.85–1.16)
INR BLD: 1.65 RATIO — HIGH (ref 0.85–1.16)
INR BLD: 1.7 RATIO — HIGH (ref 0.85–1.16)
INR BLD: 1.75 RATIO — HIGH (ref 0.85–1.16)
INR BLD: 1.76 RATIO — HIGH (ref 0.85–1.16)
INR BLD: 1.77 RATIO — HIGH (ref 0.85–1.16)
INR BLD: 1.78 RATIO — HIGH (ref 0.85–1.16)
INR BLD: 1.79 RATIO — HIGH (ref 0.85–1.16)
INR BLD: 1.84 RATIO — HIGH (ref 0.85–1.16)
INR BLD: 1.84 RATIO — HIGH (ref 0.85–1.16)
INR BLD: 1.91 RATIO — HIGH (ref 0.85–1.16)
INR BLD: 1.93 RATIO — HIGH (ref 0.85–1.16)
INR BLD: 1.97 RATIO — HIGH (ref 0.85–1.16)
INR BLD: 1.99 RATIO — HIGH (ref 0.85–1.16)
INR BLD: 2.04 RATIO — HIGH (ref 0.85–1.16)
INR BLD: 2.07 RATIO — HIGH (ref 0.85–1.16)
INR BLD: 2.16 RATIO — HIGH (ref 0.85–1.16)
INR BLD: 2.16 RATIO — HIGH (ref 0.85–1.16)
INR BLD: 2.17 RATIO — HIGH (ref 0.85–1.16)
INR BLD: 2.23 RATIO — HIGH (ref 0.85–1.16)
INR BLD: 2.24 RATIO — HIGH (ref 0.85–1.16)
INR BLD: 2.26 RATIO — HIGH (ref 0.85–1.16)
INR BLD: 2.26 RATIO — HIGH (ref 0.85–1.16)
INR BLD: 2.27 RATIO — HIGH (ref 0.85–1.16)
INR BLD: 2.34 RATIO — HIGH (ref 0.85–1.16)
INR BLD: 2.37 RATIO — HIGH (ref 0.85–1.16)
INR BLD: 2.37 RATIO — HIGH (ref 0.85–1.16)
INR BLD: 2.48 RATIO — HIGH (ref 0.85–1.16)
INR BLD: 2.48 RATIO — HIGH (ref 0.85–1.16)
INR BLD: 2.49 RATIO — HIGH (ref 0.85–1.16)
INR BLD: 2.49 RATIO — HIGH (ref 0.85–1.16)
INR BLD: 2.53 RATIO — HIGH (ref 0.85–1.16)
INR BLD: 2.54 RATIO — HIGH (ref 0.85–1.16)
INR BLD: 2.54 RATIO — HIGH (ref 0.85–1.16)
INR BLD: 2.55 RATIO — HIGH (ref 0.85–1.16)
INR BLD: 2.57 RATIO — HIGH (ref 0.85–1.16)
INR BLD: 2.58 RATIO — HIGH (ref 0.85–1.16)
INR BLD: 2.64 RATIO — HIGH (ref 0.85–1.16)
INR BLD: 2.64 RATIO — HIGH (ref 0.85–1.16)
INR BLD: 2.68 RATIO — HIGH (ref 0.85–1.16)
INR BLD: 2.73 RATIO — HIGH (ref 0.85–1.16)
INR BLD: 2.74 RATIO — HIGH (ref 0.85–1.16)
INR BLD: 2.75 RATIO — HIGH (ref 0.85–1.16)
INR BLD: 2.77 RATIO — HIGH (ref 0.85–1.16)
INR BLD: 2.8 RATIO — HIGH (ref 0.85–1.16)
INR BLD: 2.85 RATIO — HIGH (ref 0.85–1.16)
INR BLD: 2.89 RATIO — HIGH (ref 0.85–1.16)
INR BLD: 2.91 RATIO — HIGH (ref 0.85–1.16)
INR BLD: 3.01 RATIO — HIGH (ref 0.85–1.16)
INR BLD: 3.02 RATIO — HIGH (ref 0.85–1.16)
INR BLD: 3.17 RATIO — HIGH (ref 0.85–1.16)
INR BLD: 3.19 RATIO — HIGH (ref 0.85–1.16)
INR BLD: 3.45 RATIO — HIGH (ref 0.85–1.16)
IRON SATN MFR SERPL: 76 UG/DL — SIGNIFICANT CHANGE UP (ref 45–165)
IRON SATN MFR SERPL: SIGNIFICANT CHANGE UP % (ref 16–55)
LDH SERPL L TO P-CCNC: 258 U/L — HIGH (ref 50–242)
LYMPHOCYTES # BLD AUTO: 0 % — LOW (ref 13–44)
LYMPHOCYTES # BLD AUTO: 0 K/UL — LOW (ref 1–3.3)
LYMPHOCYTES # BLD AUTO: 0.01 K/UL — LOW (ref 1–3.3)
LYMPHOCYTES # BLD AUTO: 0.01 K/UL — LOW (ref 1–3.3)
LYMPHOCYTES # BLD AUTO: 0.03 K/UL — LOW (ref 1–3.3)
LYMPHOCYTES # BLD AUTO: 0.05 K/UL — LOW (ref 1–3.3)
LYMPHOCYTES # BLD AUTO: 0.06 K/UL — LOW (ref 1–3.3)
LYMPHOCYTES # BLD AUTO: 0.07 K/UL — LOW (ref 1–3.3)
LYMPHOCYTES # BLD AUTO: 1.5 % — LOW (ref 13–44)
LYMPHOCYTES # BLD AUTO: 1.5 % — LOW (ref 13–44)
LYMPHOCYTES # BLD AUTO: 1.8 % — LOW (ref 13–44)
LYMPHOCYTES # BLD AUTO: 2.5 % — LOW (ref 13–44)
LYMPHOCYTES # BLD AUTO: 6 % — LOW (ref 13–44)
LYMPHOCYTES # BLD AUTO: 6.3 % — LOW (ref 13–44)
LYMPHOCYTES # BLD AUTO: 6.3 % — LOW (ref 13–44)
LYMPHOCYTES # BLD AUTO: 8 % — LOW (ref 13–44)
MAGNESIUM SERPL-MCNC: 1.8 MG/DL — SIGNIFICANT CHANGE UP (ref 1.6–2.6)
MAGNESIUM SERPL-MCNC: 2 MG/DL — SIGNIFICANT CHANGE UP (ref 1.6–2.6)
MAGNESIUM SERPL-MCNC: 2.1 MG/DL — SIGNIFICANT CHANGE UP (ref 1.6–2.6)
MAGNESIUM SERPL-MCNC: 2.2 MG/DL — SIGNIFICANT CHANGE UP (ref 1.6–2.6)
MAGNESIUM SERPL-MCNC: 2.3 MG/DL — SIGNIFICANT CHANGE UP (ref 1.6–2.6)
MAGNESIUM SERPL-MCNC: 2.4 MG/DL — SIGNIFICANT CHANGE UP (ref 1.6–2.6)
MAGNESIUM SERPL-MCNC: 2.5 MG/DL — SIGNIFICANT CHANGE UP (ref 1.6–2.6)
MAGNESIUM SERPL-MCNC: 2.6 MG/DL — SIGNIFICANT CHANGE UP (ref 1.6–2.6)
MAGNESIUM SERPL-MCNC: 2.8 MG/DL — HIGH (ref 1.6–2.6)
MAGNESIUM SERPL-MCNC: 2.9 MG/DL — HIGH (ref 1.6–2.6)
MAGNESIUM SERPL-MCNC: 3 MG/DL — HIGH (ref 1.6–2.6)
MANUAL SMEAR VERIFICATION: SIGNIFICANT CHANGE UP
MCHC RBC-ENTMCNC: 29.4 PG — SIGNIFICANT CHANGE UP (ref 27–34)
MCHC RBC-ENTMCNC: 29.7 PG — SIGNIFICANT CHANGE UP (ref 27–34)
MCHC RBC-ENTMCNC: 29.8 PG — SIGNIFICANT CHANGE UP (ref 27–34)
MCHC RBC-ENTMCNC: 29.9 PG — SIGNIFICANT CHANGE UP (ref 27–34)
MCHC RBC-ENTMCNC: 30 PG — SIGNIFICANT CHANGE UP (ref 27–34)
MCHC RBC-ENTMCNC: 30.1 PG — SIGNIFICANT CHANGE UP (ref 27–34)
MCHC RBC-ENTMCNC: 30.2 PG — SIGNIFICANT CHANGE UP (ref 27–34)
MCHC RBC-ENTMCNC: 30.3 PG — SIGNIFICANT CHANGE UP (ref 27–34)
MCHC RBC-ENTMCNC: 30.4 PG — SIGNIFICANT CHANGE UP (ref 27–34)
MCHC RBC-ENTMCNC: 30.5 PG — SIGNIFICANT CHANGE UP (ref 27–34)
MCHC RBC-ENTMCNC: 30.6 PG — SIGNIFICANT CHANGE UP (ref 27–34)
MCHC RBC-ENTMCNC: 30.7 PG — SIGNIFICANT CHANGE UP (ref 27–34)
MCHC RBC-ENTMCNC: 30.8 PG — SIGNIFICANT CHANGE UP (ref 27–34)
MCHC RBC-ENTMCNC: 30.8 PG — SIGNIFICANT CHANGE UP (ref 27–34)
MCHC RBC-ENTMCNC: 30.9 PG — SIGNIFICANT CHANGE UP (ref 27–34)
MCHC RBC-ENTMCNC: 31 PG — SIGNIFICANT CHANGE UP (ref 27–34)
MCHC RBC-ENTMCNC: 31.1 PG — SIGNIFICANT CHANGE UP (ref 27–34)
MCHC RBC-ENTMCNC: 31.2 PG — SIGNIFICANT CHANGE UP (ref 27–34)
MCHC RBC-ENTMCNC: 31.3 PG — SIGNIFICANT CHANGE UP (ref 27–34)
MCHC RBC-ENTMCNC: 31.5 PG — SIGNIFICANT CHANGE UP (ref 27–34)
MCHC RBC-ENTMCNC: 31.6 PG — SIGNIFICANT CHANGE UP (ref 27–34)
MCHC RBC-ENTMCNC: 31.7 PG — SIGNIFICANT CHANGE UP (ref 27–34)
MCHC RBC-ENTMCNC: 31.7 PG — SIGNIFICANT CHANGE UP (ref 27–34)
MCHC RBC-ENTMCNC: 31.8 PG — SIGNIFICANT CHANGE UP (ref 27–34)
MCHC RBC-ENTMCNC: 31.9 PG — SIGNIFICANT CHANGE UP (ref 27–34)
MCHC RBC-ENTMCNC: 32 PG — SIGNIFICANT CHANGE UP (ref 27–34)
MCHC RBC-ENTMCNC: 32.1 PG — SIGNIFICANT CHANGE UP (ref 27–34)
MCHC RBC-ENTMCNC: 32.2 PG — SIGNIFICANT CHANGE UP (ref 27–34)
MCHC RBC-ENTMCNC: 32.3 PG — SIGNIFICANT CHANGE UP (ref 27–34)
MCHC RBC-ENTMCNC: 32.4 PG — SIGNIFICANT CHANGE UP (ref 27–34)
MCHC RBC-ENTMCNC: 32.5 G/DL — SIGNIFICANT CHANGE UP (ref 32–36)
MCHC RBC-ENTMCNC: 32.5 PG — SIGNIFICANT CHANGE UP (ref 27–34)
MCHC RBC-ENTMCNC: 32.5 PG — SIGNIFICANT CHANGE UP (ref 27–34)
MCHC RBC-ENTMCNC: 32.6 PG — SIGNIFICANT CHANGE UP (ref 27–34)
MCHC RBC-ENTMCNC: 32.7 G/DL — SIGNIFICANT CHANGE UP (ref 32–36)
MCHC RBC-ENTMCNC: 32.7 PG — SIGNIFICANT CHANGE UP (ref 27–34)
MCHC RBC-ENTMCNC: 33 G/DL — SIGNIFICANT CHANGE UP (ref 32–36)
MCHC RBC-ENTMCNC: 33 G/DL — SIGNIFICANT CHANGE UP (ref 32–36)
MCHC RBC-ENTMCNC: 33.1 G/DL — SIGNIFICANT CHANGE UP (ref 32–36)
MCHC RBC-ENTMCNC: 33.2 G/DL — SIGNIFICANT CHANGE UP (ref 32–36)
MCHC RBC-ENTMCNC: 33.3 G/DL — SIGNIFICANT CHANGE UP (ref 32–36)
MCHC RBC-ENTMCNC: 33.5 G/DL — SIGNIFICANT CHANGE UP (ref 32–36)
MCHC RBC-ENTMCNC: 33.5 G/DL — SIGNIFICANT CHANGE UP (ref 32–36)
MCHC RBC-ENTMCNC: 33.6 G/DL — SIGNIFICANT CHANGE UP (ref 32–36)
MCHC RBC-ENTMCNC: 33.7 G/DL — SIGNIFICANT CHANGE UP (ref 32–36)
MCHC RBC-ENTMCNC: 33.8 G/DL — SIGNIFICANT CHANGE UP (ref 32–36)
MCHC RBC-ENTMCNC: 33.9 G/DL — SIGNIFICANT CHANGE UP (ref 32–36)
MCHC RBC-ENTMCNC: 34 G/DL — SIGNIFICANT CHANGE UP (ref 32–36)
MCHC RBC-ENTMCNC: 34.1 G/DL — SIGNIFICANT CHANGE UP (ref 32–36)
MCHC RBC-ENTMCNC: 34.2 G/DL — SIGNIFICANT CHANGE UP (ref 32–36)
MCHC RBC-ENTMCNC: 34.3 G/DL — SIGNIFICANT CHANGE UP (ref 32–36)
MCHC RBC-ENTMCNC: 34.4 G/DL — SIGNIFICANT CHANGE UP (ref 32–36)
MCHC RBC-ENTMCNC: 34.5 G/DL — SIGNIFICANT CHANGE UP (ref 32–36)
MCHC RBC-ENTMCNC: 34.6 G/DL — SIGNIFICANT CHANGE UP (ref 32–36)
MCHC RBC-ENTMCNC: 34.7 G/DL — SIGNIFICANT CHANGE UP (ref 32–36)
MCHC RBC-ENTMCNC: 34.8 G/DL — SIGNIFICANT CHANGE UP (ref 32–36)
MCHC RBC-ENTMCNC: 34.8 G/DL — SIGNIFICANT CHANGE UP (ref 32–36)
MCHC RBC-ENTMCNC: 34.9 G/DL — SIGNIFICANT CHANGE UP (ref 32–36)
MCHC RBC-ENTMCNC: 35 G/DL — SIGNIFICANT CHANGE UP (ref 32–36)
MCHC RBC-ENTMCNC: 35 G/DL — SIGNIFICANT CHANGE UP (ref 32–36)
MCHC RBC-ENTMCNC: 35.1 G/DL — SIGNIFICANT CHANGE UP (ref 32–36)
MCHC RBC-ENTMCNC: 35.1 G/DL — SIGNIFICANT CHANGE UP (ref 32–36)
MCHC RBC-ENTMCNC: 35.2 G/DL — SIGNIFICANT CHANGE UP (ref 32–36)
MCHC RBC-ENTMCNC: 35.2 G/DL — SIGNIFICANT CHANGE UP (ref 32–36)
MCHC RBC-ENTMCNC: 35.4 G/DL — SIGNIFICANT CHANGE UP (ref 32–36)
MCHC RBC-ENTMCNC: 35.5 G/DL — SIGNIFICANT CHANGE UP (ref 32–36)
MCHC RBC-ENTMCNC: 35.6 G/DL — SIGNIFICANT CHANGE UP (ref 32–36)
MCHC RBC-ENTMCNC: 35.6 G/DL — SIGNIFICANT CHANGE UP (ref 32–36)
MCHC RBC-ENTMCNC: 35.8 G/DL — SIGNIFICANT CHANGE UP (ref 32–36)
MCHC RBC-ENTMCNC: 35.9 G/DL — SIGNIFICANT CHANGE UP (ref 32–36)
MCHC RBC-ENTMCNC: 35.9 G/DL — SIGNIFICANT CHANGE UP (ref 32–36)
MCHC RBC-ENTMCNC: 36 G/DL — SIGNIFICANT CHANGE UP (ref 32–36)
MCHC RBC-ENTMCNC: 36.3 G/DL — HIGH (ref 32–36)
MCHC RBC-ENTMCNC: 36.4 G/DL — HIGH (ref 32–36)
MCV RBC AUTO: 87.1 FL — SIGNIFICANT CHANGE UP (ref 80–100)
MCV RBC AUTO: 87.4 FL — SIGNIFICANT CHANGE UP (ref 80–100)
MCV RBC AUTO: 87.4 FL — SIGNIFICANT CHANGE UP (ref 80–100)
MCV RBC AUTO: 87.5 FL — SIGNIFICANT CHANGE UP (ref 80–100)
MCV RBC AUTO: 88.3 FL — SIGNIFICANT CHANGE UP (ref 80–100)
MCV RBC AUTO: 88.4 FL — SIGNIFICANT CHANGE UP (ref 80–100)
MCV RBC AUTO: 88.6 FL — SIGNIFICANT CHANGE UP (ref 80–100)
MCV RBC AUTO: 88.7 FL — SIGNIFICANT CHANGE UP (ref 80–100)
MCV RBC AUTO: 89.2 FL — SIGNIFICANT CHANGE UP (ref 80–100)
MCV RBC AUTO: 89.3 FL — SIGNIFICANT CHANGE UP (ref 80–100)
MCV RBC AUTO: 89.3 FL — SIGNIFICANT CHANGE UP (ref 80–100)
MCV RBC AUTO: 89.4 FL — SIGNIFICANT CHANGE UP (ref 80–100)
MCV RBC AUTO: 89.5 FL — SIGNIFICANT CHANGE UP (ref 80–100)
MCV RBC AUTO: 89.7 FL — SIGNIFICANT CHANGE UP (ref 80–100)
MCV RBC AUTO: 89.7 FL — SIGNIFICANT CHANGE UP (ref 80–100)
MCV RBC AUTO: 89.8 FL — SIGNIFICANT CHANGE UP (ref 80–100)
MCV RBC AUTO: 89.9 FL — SIGNIFICANT CHANGE UP (ref 80–100)
MCV RBC AUTO: 89.9 FL — SIGNIFICANT CHANGE UP (ref 80–100)
MCV RBC AUTO: 90.1 FL — SIGNIFICANT CHANGE UP (ref 80–100)
MCV RBC AUTO: 90.2 FL — SIGNIFICANT CHANGE UP (ref 80–100)
MCV RBC AUTO: 90.2 FL — SIGNIFICANT CHANGE UP (ref 80–100)
MCV RBC AUTO: 90.3 FL — SIGNIFICANT CHANGE UP (ref 80–100)
MCV RBC AUTO: 90.3 FL — SIGNIFICANT CHANGE UP (ref 80–100)
MCV RBC AUTO: 90.4 FL — SIGNIFICANT CHANGE UP (ref 80–100)
MCV RBC AUTO: 90.4 FL — SIGNIFICANT CHANGE UP (ref 80–100)
MCV RBC AUTO: 90.5 FL — SIGNIFICANT CHANGE UP (ref 80–100)
MCV RBC AUTO: 90.6 FL — SIGNIFICANT CHANGE UP (ref 80–100)
MCV RBC AUTO: 90.7 FL — SIGNIFICANT CHANGE UP (ref 80–100)
MCV RBC AUTO: 90.7 FL — SIGNIFICANT CHANGE UP (ref 80–100)
MCV RBC AUTO: 90.8 FL — SIGNIFICANT CHANGE UP (ref 80–100)
MCV RBC AUTO: 90.8 FL — SIGNIFICANT CHANGE UP (ref 80–100)
MCV RBC AUTO: 90.9 FL — SIGNIFICANT CHANGE UP (ref 80–100)
MCV RBC AUTO: 90.9 FL — SIGNIFICANT CHANGE UP (ref 80–100)
MCV RBC AUTO: 91 FL — SIGNIFICANT CHANGE UP (ref 80–100)
MCV RBC AUTO: 91.1 FL — SIGNIFICANT CHANGE UP (ref 80–100)
MCV RBC AUTO: 91.2 FL — SIGNIFICANT CHANGE UP (ref 80–100)
MCV RBC AUTO: 91.3 FL — SIGNIFICANT CHANGE UP (ref 80–100)
MCV RBC AUTO: 91.4 FL — SIGNIFICANT CHANGE UP (ref 80–100)
MCV RBC AUTO: 91.5 FL — SIGNIFICANT CHANGE UP (ref 80–100)
MCV RBC AUTO: 91.6 FL — SIGNIFICANT CHANGE UP (ref 80–100)
MCV RBC AUTO: 91.6 FL — SIGNIFICANT CHANGE UP (ref 80–100)
MCV RBC AUTO: 91.7 FL — SIGNIFICANT CHANGE UP (ref 80–100)
MCV RBC AUTO: 91.8 FL — SIGNIFICANT CHANGE UP (ref 80–100)
MCV RBC AUTO: 92 FL — SIGNIFICANT CHANGE UP (ref 80–100)
MCV RBC AUTO: 92 FL — SIGNIFICANT CHANGE UP (ref 80–100)
MCV RBC AUTO: 92.1 FL — SIGNIFICANT CHANGE UP (ref 80–100)
MCV RBC AUTO: 92.2 FL — SIGNIFICANT CHANGE UP (ref 80–100)
MCV RBC AUTO: 92.3 FL — SIGNIFICANT CHANGE UP (ref 80–100)
MCV RBC AUTO: 92.3 FL — SIGNIFICANT CHANGE UP (ref 80–100)
MCV RBC AUTO: 92.5 FL — SIGNIFICANT CHANGE UP (ref 80–100)
MCV RBC AUTO: 92.6 FL — SIGNIFICANT CHANGE UP (ref 80–100)
MCV RBC AUTO: 93.4 FL — SIGNIFICANT CHANGE UP (ref 80–100)
MCV RBC AUTO: 93.6 FL — SIGNIFICANT CHANGE UP (ref 80–100)
MCV RBC AUTO: 93.7 FL — SIGNIFICANT CHANGE UP (ref 80–100)
MCV RBC AUTO: 93.7 FL — SIGNIFICANT CHANGE UP (ref 80–100)
MCV RBC AUTO: 94 FL — SIGNIFICANT CHANGE UP (ref 80–100)
MCV RBC AUTO: 94 FL — SIGNIFICANT CHANGE UP (ref 80–100)
MCV RBC AUTO: 94.1 FL — SIGNIFICANT CHANGE UP (ref 80–100)
MCV RBC AUTO: 94.1 FL — SIGNIFICANT CHANGE UP (ref 80–100)
MCV RBC AUTO: 94.5 FL — SIGNIFICANT CHANGE UP (ref 80–100)
MCV RBC AUTO: 94.7 FL — SIGNIFICANT CHANGE UP (ref 80–100)
MCV RBC AUTO: 94.8 FL — SIGNIFICANT CHANGE UP (ref 80–100)
MCV RBC AUTO: 96.1 FL — SIGNIFICANT CHANGE UP (ref 80–100)
METHOD TYPE: SIGNIFICANT CHANGE UP
MONOCYTES # BLD AUTO: 0 K/UL — SIGNIFICANT CHANGE UP (ref 0–0.9)
MONOCYTES # BLD AUTO: 0.02 K/UL — SIGNIFICANT CHANGE UP (ref 0–0.9)
MONOCYTES # BLD AUTO: 0.04 K/UL — SIGNIFICANT CHANGE UP (ref 0–0.9)
MONOCYTES # BLD AUTO: 0.05 K/UL — SIGNIFICANT CHANGE UP (ref 0–0.9)
MONOCYTES # BLD AUTO: 0.05 K/UL — SIGNIFICANT CHANGE UP (ref 0–0.9)
MONOCYTES # BLD AUTO: 0.07 K/UL — SIGNIFICANT CHANGE UP (ref 0–0.9)
MONOCYTES # BLD AUTO: 0.19 K/UL — SIGNIFICANT CHANGE UP (ref 0–0.9)
MONOCYTES # BLD AUTO: 0.27 K/UL — SIGNIFICANT CHANGE UP (ref 0–0.9)
MONOCYTES # BLD AUTO: 1.11 K/UL — HIGH (ref 0–0.9)
MONOCYTES NFR BLD AUTO: 0 % — LOW (ref 2–14)
MONOCYTES NFR BLD AUTO: 100 % — HIGH (ref 2–14)
MONOCYTES NFR BLD AUTO: 2.7 % — SIGNIFICANT CHANGE UP (ref 2–14)
MONOCYTES NFR BLD AUTO: 28 % — HIGH (ref 2–14)
MONOCYTES NFR BLD AUTO: 3.1 % — SIGNIFICANT CHANGE UP (ref 2–14)
MONOCYTES NFR BLD AUTO: 3.4 % — SIGNIFICANT CHANGE UP (ref 2–14)
MONOCYTES NFR BLD AUTO: 3.6 % — SIGNIFICANT CHANGE UP (ref 2–14)
MONOCYTES NFR BLD AUTO: 4.4 % — SIGNIFICANT CHANGE UP (ref 2–14)
MONOCYTES NFR BLD AUTO: 5.2 % — SIGNIFICANT CHANGE UP (ref 2–14)
MONOCYTES NFR BLD AUTO: 6 % — SIGNIFICANT CHANGE UP (ref 2–14)
MONOCYTES NFR BLD AUTO: 8.7 % — SIGNIFICANT CHANGE UP (ref 2–14)
NEUTROPHILS # BLD AUTO: 0 K/UL — LOW (ref 1.8–7.4)
NEUTROPHILS # BLD AUTO: 0.21 K/UL — LOW (ref 1.8–7.4)
NEUTROPHILS # BLD AUTO: 0.4 K/UL — LOW (ref 1.8–7.4)
NEUTROPHILS # BLD AUTO: 0.51 K/UL — LOW (ref 1.8–7.4)
NEUTROPHILS # BLD AUTO: 0.6 K/UL — LOW (ref 1.8–7.4)
NEUTROPHILS # BLD AUTO: 0.76 K/UL — LOW (ref 1.8–7.4)
NEUTROPHILS # BLD AUTO: 0.83 K/UL — LOW (ref 1.8–7.4)
NEUTROPHILS # BLD AUTO: 1.04 K/UL — LOW (ref 1.8–7.4)
NEUTROPHILS # BLD AUTO: 1.09 K/UL — LOW (ref 1.8–7.4)
NEUTROPHILS # BLD AUTO: 1.09 K/UL — LOW (ref 1.8–7.4)
NEUTROPHILS # BLD AUTO: 2.68 K/UL — SIGNIFICANT CHANGE UP (ref 1.8–7.4)
NEUTROPHILS NFR BLD AUTO: 0 % — LOW (ref 43–77)
NEUTROPHILS NFR BLD AUTO: 28.8 % — LOW (ref 43–77)
NEUTROPHILS NFR BLD AUTO: 56 % — SIGNIFICANT CHANGE UP (ref 43–77)
NEUTROPHILS NFR BLD AUTO: 76.8 % — SIGNIFICANT CHANGE UP (ref 43–77)
NEUTROPHILS NFR BLD AUTO: 84 % — HIGH (ref 43–77)
NEUTROPHILS NFR BLD AUTO: 85.3 % — HIGH (ref 43–77)
NEUTROPHILS NFR BLD AUTO: 86.5 % — HIGH (ref 43–77)
NEUTROPHILS NFR BLD AUTO: 91 % — HIGH (ref 43–77)
NEUTROPHILS NFR BLD AUTO: 91.6 % — HIGH (ref 43–77)
NEUTROPHILS NFR BLD AUTO: 92.4 % — HIGH (ref 43–77)
NEUTROPHILS NFR BLD AUTO: 93.7 % — HIGH (ref 43–77)
NRBC # BLD: 0 /100 WBCS — SIGNIFICANT CHANGE UP (ref 0–0)
NRBC # BLD: 1 /100 WBCS — HIGH (ref 0–0)
NRBC # BLD: 10 /100 WBCS — HIGH (ref 0–0)
NRBC # BLD: 11 /100 WBCS — HIGH (ref 0–0)
NRBC # BLD: 12 /100 WBCS — HIGH (ref 0–0)
NRBC # BLD: 14 /100 WBCS — HIGH (ref 0–0)
NRBC # BLD: 16 /100 WBCS — HIGH (ref 0–0)
NRBC # BLD: 18 /100 WBCS — HIGH (ref 0–0)
NRBC # BLD: 2 /100 WBCS — HIGH (ref 0–0)
NRBC # BLD: 23 /100 WBCS — HIGH (ref 0–0)
NRBC # BLD: 3 /100 WBCS — HIGH (ref 0–0)
NRBC # BLD: 4 /100 WBCS — HIGH (ref 0–0)
NRBC # BLD: 44 /100 WBCS — HIGH (ref 0–0)
NRBC # BLD: 5 /100 WBCS — HIGH (ref 0–0)
NRBC # BLD: 50 /100 WBCS — HIGH (ref 0–0)
NRBC # BLD: 6 /100 WBCS — HIGH (ref 0–0)
NRBC # BLD: 7 /100 WBCS — HIGH (ref 0–0)
NRBC # BLD: 8 /100 WBCS — HIGH (ref 0–0)
NRBC # BLD: 9 /100 WBCS — HIGH (ref 0–0)
NRBC BLD-RTO: 0 /100 WBCS — SIGNIFICANT CHANGE UP (ref 0–0)
NRBC BLD-RTO: 1 /100 WBCS — HIGH (ref 0–0)
NRBC BLD-RTO: 10 /100 WBCS — HIGH (ref 0–0)
NRBC BLD-RTO: 11 /100 WBCS — HIGH (ref 0–0)
NRBC BLD-RTO: 12 /100 WBCS — HIGH (ref 0–0)
NRBC BLD-RTO: 14 /100 WBCS — HIGH (ref 0–0)
NRBC BLD-RTO: 16 /100 WBCS — HIGH (ref 0–0)
NRBC BLD-RTO: 18 /100 WBCS — HIGH (ref 0–0)
NRBC BLD-RTO: 2 /100 WBCS — HIGH (ref 0–0)
NRBC BLD-RTO: 23 /100 WBCS — HIGH (ref 0–0)
NRBC BLD-RTO: 3 /100 WBCS — HIGH (ref 0–0)
NRBC BLD-RTO: 4 /100 WBCS — HIGH (ref 0–0)
NRBC BLD-RTO: 44 /100 WBCS — HIGH (ref 0–0)
NRBC BLD-RTO: 5 /100 WBCS — HIGH (ref 0–0)
NRBC BLD-RTO: 50 /100 WBCS — HIGH (ref 0–0)
NRBC BLD-RTO: 6 /100 WBCS — HIGH (ref 0–0)
NRBC BLD-RTO: 7 /100 WBCS — HIGH (ref 0–0)
NRBC BLD-RTO: 8 /100 WBCS — HIGH (ref 0–0)
NRBC BLD-RTO: 9 /100 WBCS — HIGH (ref 0–0)
ORGANISM # SPEC MICROSCOPIC CNT: ABNORMAL
PHOSPHATE SERPL-MCNC: 2.5 MG/DL — SIGNIFICANT CHANGE UP (ref 2.5–4.5)
PHOSPHATE SERPL-MCNC: 2.6 MG/DL — SIGNIFICANT CHANGE UP (ref 2.5–4.5)
PHOSPHATE SERPL-MCNC: 2.6 MG/DL — SIGNIFICANT CHANGE UP (ref 2.5–4.5)
PHOSPHATE SERPL-MCNC: 2.7 MG/DL — SIGNIFICANT CHANGE UP (ref 2.5–4.5)
PHOSPHATE SERPL-MCNC: 2.8 MG/DL — SIGNIFICANT CHANGE UP (ref 2.5–4.5)
PHOSPHATE SERPL-MCNC: 2.9 MG/DL — SIGNIFICANT CHANGE UP (ref 2.5–4.5)
PHOSPHATE SERPL-MCNC: 3 MG/DL — SIGNIFICANT CHANGE UP (ref 2.5–4.5)
PHOSPHATE SERPL-MCNC: 3.1 MG/DL — SIGNIFICANT CHANGE UP (ref 2.5–4.5)
PHOSPHATE SERPL-MCNC: 3.2 MG/DL — SIGNIFICANT CHANGE UP (ref 2.5–4.5)
PHOSPHATE SERPL-MCNC: 3.3 MG/DL — SIGNIFICANT CHANGE UP (ref 2.5–4.5)
PHOSPHATE SERPL-MCNC: 3.4 MG/DL — SIGNIFICANT CHANGE UP (ref 2.5–4.5)
PHOSPHATE SERPL-MCNC: 3.5 MG/DL — SIGNIFICANT CHANGE UP (ref 2.5–4.5)
PHOSPHATE SERPL-MCNC: 3.6 MG/DL — SIGNIFICANT CHANGE UP (ref 2.5–4.5)
PHOSPHATE SERPL-MCNC: 3.7 MG/DL — SIGNIFICANT CHANGE UP (ref 2.5–4.5)
PHOSPHATE SERPL-MCNC: 3.8 MG/DL — SIGNIFICANT CHANGE UP (ref 2.5–4.5)
PHOSPHATE SERPL-MCNC: 3.8 MG/DL — SIGNIFICANT CHANGE UP (ref 2.5–4.5)
PHOSPHATE SERPL-MCNC: 3.9 MG/DL — SIGNIFICANT CHANGE UP (ref 2.5–4.5)
PHOSPHATE SERPL-MCNC: 4 MG/DL — SIGNIFICANT CHANGE UP (ref 2.5–4.5)
PHOSPHATE SERPL-MCNC: 4.1 MG/DL — SIGNIFICANT CHANGE UP (ref 2.5–4.5)
PHOSPHATE SERPL-MCNC: 4.2 MG/DL — SIGNIFICANT CHANGE UP (ref 2.5–4.5)
PHOSPHATE SERPL-MCNC: 4.3 MG/DL — SIGNIFICANT CHANGE UP (ref 2.5–4.5)
PHOSPHATE SERPL-MCNC: 4.4 MG/DL — SIGNIFICANT CHANGE UP (ref 2.5–4.5)
PHOSPHATE SERPL-MCNC: 4.4 MG/DL — SIGNIFICANT CHANGE UP (ref 2.5–4.5)
PLAT MORPH BLD: NORMAL — SIGNIFICANT CHANGE UP
PLATELET # BLD AUTO: 10 K/UL — CRITICAL LOW (ref 150–400)
PLATELET # BLD AUTO: 10 K/UL — CRITICAL LOW (ref 150–400)
PLATELET # BLD AUTO: 11 K/UL — CRITICAL LOW (ref 150–400)
PLATELET # BLD AUTO: 11 K/UL — CRITICAL LOW (ref 150–400)
PLATELET # BLD AUTO: 12 K/UL — CRITICAL LOW (ref 150–400)
PLATELET # BLD AUTO: 13 K/UL — CRITICAL LOW (ref 150–400)
PLATELET # BLD AUTO: 14 K/UL — CRITICAL LOW (ref 150–400)
PLATELET # BLD AUTO: 16 K/UL — CRITICAL LOW (ref 150–400)
PLATELET # BLD AUTO: 17 K/UL — CRITICAL LOW (ref 150–400)
PLATELET # BLD AUTO: 19 K/UL — CRITICAL LOW (ref 150–400)
PLATELET # BLD AUTO: 20 K/UL — CRITICAL LOW (ref 150–400)
PLATELET # BLD AUTO: 21 K/UL — LOW (ref 150–400)
PLATELET # BLD AUTO: 22 K/UL — LOW (ref 150–400)
PLATELET # BLD AUTO: 23 K/UL — LOW (ref 150–400)
PLATELET # BLD AUTO: 24 K/UL — LOW (ref 150–400)
PLATELET # BLD AUTO: 24 K/UL — LOW (ref 150–400)
PLATELET # BLD AUTO: 25 K/UL — LOW (ref 150–400)
PLATELET # BLD AUTO: 26 K/UL — LOW (ref 150–400)
PLATELET # BLD AUTO: 27 K/UL — LOW (ref 150–400)
PLATELET # BLD AUTO: 27 K/UL — LOW (ref 150–400)
PLATELET # BLD AUTO: 28 K/UL — LOW (ref 150–400)
PLATELET # BLD AUTO: 3 K/UL — CRITICAL LOW (ref 150–400)
PLATELET # BLD AUTO: 30 K/UL — LOW (ref 150–400)
PLATELET # BLD AUTO: 30 K/UL — LOW (ref 150–400)
PLATELET # BLD AUTO: 32 K/UL — LOW (ref 150–400)
PLATELET # BLD AUTO: 32 K/UL — LOW (ref 150–400)
PLATELET # BLD AUTO: 33 K/UL — LOW (ref 150–400)
PLATELET # BLD AUTO: 34 K/UL — LOW (ref 150–400)
PLATELET # BLD AUTO: 35 K/UL — LOW (ref 150–400)
PLATELET # BLD AUTO: 37 K/UL — LOW (ref 150–400)
PLATELET # BLD AUTO: 38 K/UL — LOW (ref 150–400)
PLATELET # BLD AUTO: 39 K/UL — LOW (ref 150–400)
PLATELET # BLD AUTO: 4 K/UL — CRITICAL LOW (ref 150–400)
PLATELET # BLD AUTO: 4 K/UL — CRITICAL LOW (ref 150–400)
PLATELET # BLD AUTO: 42 K/UL — LOW (ref 150–400)
PLATELET # BLD AUTO: 42 K/UL — LOW (ref 150–400)
PLATELET # BLD AUTO: 47 K/UL — LOW (ref 150–400)
PLATELET # BLD AUTO: 48 K/UL — LOW (ref 150–400)
PLATELET # BLD AUTO: 6 K/UL — CRITICAL LOW (ref 150–400)
PLATELET # BLD AUTO: 6 K/UL — CRITICAL LOW (ref 150–400)
PLATELET # BLD AUTO: 7 K/UL — CRITICAL LOW (ref 150–400)
PLATELET # BLD AUTO: 8 K/UL — CRITICAL LOW (ref 150–400)
PLATELET # BLD AUTO: 9 K/UL — CRITICAL LOW (ref 150–400)
POTASSIUM SERPL-MCNC: 2.9 MMOL/L — CRITICAL LOW (ref 3.5–5.3)
POTASSIUM SERPL-MCNC: 3.1 MMOL/L — LOW (ref 3.5–5.3)
POTASSIUM SERPL-MCNC: 3.2 MMOL/L — LOW (ref 3.5–5.3)
POTASSIUM SERPL-MCNC: 3.2 MMOL/L — LOW (ref 3.5–5.3)
POTASSIUM SERPL-MCNC: 3.5 MMOL/L — SIGNIFICANT CHANGE UP (ref 3.5–5.3)
POTASSIUM SERPL-MCNC: 3.5 MMOL/L — SIGNIFICANT CHANGE UP (ref 3.5–5.3)
POTASSIUM SERPL-MCNC: 3.6 MMOL/L — SIGNIFICANT CHANGE UP (ref 3.5–5.3)
POTASSIUM SERPL-MCNC: 3.7 MMOL/L — SIGNIFICANT CHANGE UP (ref 3.5–5.3)
POTASSIUM SERPL-MCNC: 3.7 MMOL/L — SIGNIFICANT CHANGE UP (ref 3.5–5.3)
POTASSIUM SERPL-MCNC: 3.8 MMOL/L — SIGNIFICANT CHANGE UP (ref 3.5–5.3)
POTASSIUM SERPL-MCNC: 3.9 MMOL/L — SIGNIFICANT CHANGE UP (ref 3.5–5.3)
POTASSIUM SERPL-MCNC: 4 MMOL/L — SIGNIFICANT CHANGE UP (ref 3.5–5.3)
POTASSIUM SERPL-MCNC: 4.1 MMOL/L — SIGNIFICANT CHANGE UP (ref 3.5–5.3)
POTASSIUM SERPL-MCNC: 4.2 MMOL/L — SIGNIFICANT CHANGE UP (ref 3.5–5.3)
POTASSIUM SERPL-MCNC: 4.3 MMOL/L — SIGNIFICANT CHANGE UP (ref 3.5–5.3)
POTASSIUM SERPL-MCNC: 4.4 MMOL/L — SIGNIFICANT CHANGE UP (ref 3.5–5.3)
POTASSIUM SERPL-MCNC: 4.5 MMOL/L — SIGNIFICANT CHANGE UP (ref 3.5–5.3)
POTASSIUM SERPL-MCNC: 4.5 MMOL/L — SIGNIFICANT CHANGE UP (ref 3.5–5.3)
POTASSIUM SERPL-MCNC: 4.6 MMOL/L — SIGNIFICANT CHANGE UP (ref 3.5–5.3)
POTASSIUM SERPL-SCNC: 2.9 MMOL/L — CRITICAL LOW (ref 3.5–5.3)
POTASSIUM SERPL-SCNC: 3.1 MMOL/L — LOW (ref 3.5–5.3)
POTASSIUM SERPL-SCNC: 3.2 MMOL/L — LOW (ref 3.5–5.3)
POTASSIUM SERPL-SCNC: 3.2 MMOL/L — LOW (ref 3.5–5.3)
POTASSIUM SERPL-SCNC: 3.5 MMOL/L — SIGNIFICANT CHANGE UP (ref 3.5–5.3)
POTASSIUM SERPL-SCNC: 3.5 MMOL/L — SIGNIFICANT CHANGE UP (ref 3.5–5.3)
POTASSIUM SERPL-SCNC: 3.6 MMOL/L — SIGNIFICANT CHANGE UP (ref 3.5–5.3)
POTASSIUM SERPL-SCNC: 3.7 MMOL/L — SIGNIFICANT CHANGE UP (ref 3.5–5.3)
POTASSIUM SERPL-SCNC: 3.7 MMOL/L — SIGNIFICANT CHANGE UP (ref 3.5–5.3)
POTASSIUM SERPL-SCNC: 3.8 MMOL/L — SIGNIFICANT CHANGE UP (ref 3.5–5.3)
POTASSIUM SERPL-SCNC: 3.9 MMOL/L — SIGNIFICANT CHANGE UP (ref 3.5–5.3)
POTASSIUM SERPL-SCNC: 4 MMOL/L — SIGNIFICANT CHANGE UP (ref 3.5–5.3)
POTASSIUM SERPL-SCNC: 4.1 MMOL/L — SIGNIFICANT CHANGE UP (ref 3.5–5.3)
POTASSIUM SERPL-SCNC: 4.2 MMOL/L — SIGNIFICANT CHANGE UP (ref 3.5–5.3)
POTASSIUM SERPL-SCNC: 4.3 MMOL/L — SIGNIFICANT CHANGE UP (ref 3.5–5.3)
POTASSIUM SERPL-SCNC: 4.4 MMOL/L — SIGNIFICANT CHANGE UP (ref 3.5–5.3)
POTASSIUM SERPL-SCNC: 4.5 MMOL/L — SIGNIFICANT CHANGE UP (ref 3.5–5.3)
POTASSIUM SERPL-SCNC: 4.5 MMOL/L — SIGNIFICANT CHANGE UP (ref 3.5–5.3)
POTASSIUM SERPL-SCNC: 4.6 MMOL/L — SIGNIFICANT CHANGE UP (ref 3.5–5.3)
PROCALCITONIN SERPL-MCNC: 1.78 NG/ML — HIGH (ref 0.02–0.1)
PROCALCITONIN SERPL-MCNC: 2.16 NG/ML — HIGH (ref 0.02–0.1)
PROCALCITONIN SERPL-MCNC: 2.2 NG/ML — HIGH (ref 0.02–0.1)
PROCALCITONIN SERPL-MCNC: 2.22 NG/ML — HIGH (ref 0.02–0.1)
PROCALCITONIN SERPL-MCNC: 2.26 NG/ML — HIGH (ref 0.02–0.1)
PROCALCITONIN SERPL-MCNC: 2.37 NG/ML — HIGH (ref 0.02–0.1)
PROCALCITONIN SERPL-MCNC: 2.87 NG/ML — HIGH (ref 0.02–0.1)
PROT SERPL-MCNC: 3.1 G/DL — LOW (ref 6–8.3)
PROT SERPL-MCNC: 3.4 G/DL — LOW (ref 6–8.3)
PROT SERPL-MCNC: 3.5 G/DL — LOW (ref 6–8.3)
PROT SERPL-MCNC: 3.6 G/DL — LOW (ref 6–8.3)
PROT SERPL-MCNC: 3.7 G/DL — LOW (ref 6–8.3)
PROT SERPL-MCNC: 3.8 G/DL — LOW (ref 6–8.3)
PROT SERPL-MCNC: 3.9 G/DL — LOW (ref 6–8.3)
PROT SERPL-MCNC: 4 G/DL — LOW (ref 6–8.3)
PROT SERPL-MCNC: 4.1 G/DL — LOW (ref 6–8.3)
PROT SERPL-MCNC: 4.2 G/DL — LOW (ref 6–8.3)
PROT SERPL-MCNC: 4.3 G/DL — LOW (ref 6–8.3)
PROT SERPL-MCNC: 4.4 G/DL — LOW (ref 6–8.3)
PROT SERPL-MCNC: 4.5 G/DL — LOW (ref 6–8.3)
PROT SERPL-MCNC: 4.6 G/DL — LOW (ref 6–8.3)
PROT SERPL-MCNC: 4.7 G/DL — LOW (ref 6–8.3)
PROT SERPL-MCNC: 5 G/DL — LOW (ref 6–8.3)
PROT SERPL-MCNC: 5 G/DL — LOW (ref 6–8.3)
PROTHROM AB SERPL-ACNC: 17 SEC — HIGH (ref 9.9–13.4)
PROTHROM AB SERPL-ACNC: 17.2 SEC — HIGH (ref 9.9–13.4)
PROTHROM AB SERPL-ACNC: 17.8 SEC — HIGH (ref 9.9–13.4)
PROTHROM AB SERPL-ACNC: 18.9 SEC — HIGH (ref 9.9–13.4)
PROTHROM AB SERPL-ACNC: 19.3 SEC — HIGH (ref 9.9–13.4)
PROTHROM AB SERPL-ACNC: 19.9 SEC — HIGH (ref 9.9–13.4)
PROTHROM AB SERPL-ACNC: 20 SEC — HIGH (ref 9.9–13.4)
PROTHROM AB SERPL-ACNC: 20.2 SEC — HIGH (ref 9.9–13.4)
PROTHROM AB SERPL-ACNC: 20.4 SEC — HIGH (ref 9.9–13.4)
PROTHROM AB SERPL-ACNC: 20.5 SEC — HIGH (ref 9.9–13.4)
PROTHROM AB SERPL-ACNC: 20.8 SEC — HIGH (ref 9.9–13.4)
PROTHROM AB SERPL-ACNC: 21 SEC — HIGH (ref 9.9–13.4)
PROTHROM AB SERPL-ACNC: 21.6 SEC — HIGH (ref 9.9–13.4)
PROTHROM AB SERPL-ACNC: 22.1 SEC — HIGH (ref 9.9–13.4)
PROTHROM AB SERPL-ACNC: 22.3 SEC — HIGH (ref 9.9–13.4)
PROTHROM AB SERPL-ACNC: 22.7 SEC — HIGH (ref 9.9–13.4)
PROTHROM AB SERPL-ACNC: 23.3 SEC — HIGH (ref 9.9–13.4)
PROTHROM AB SERPL-ACNC: 23.6 SEC — HIGH (ref 9.9–13.4)
PROTHROM AB SERPL-ACNC: 24.4 SEC — HIGH (ref 9.9–13.4)
PROTHROM AB SERPL-ACNC: 24.4 SEC — HIGH (ref 9.9–13.4)
PROTHROM AB SERPL-ACNC: 24.6 SEC — HIGH (ref 9.9–13.4)
PROTHROM AB SERPL-ACNC: 25.4 SEC — HIGH (ref 9.9–13.4)
PROTHROM AB SERPL-ACNC: 25.5 SEC — HIGH (ref 9.9–13.4)
PROTHROM AB SERPL-ACNC: 25.8 SEC — HIGH (ref 9.9–13.4)
PROTHROM AB SERPL-ACNC: 25.8 SEC — HIGH (ref 9.9–13.4)
PROTHROM AB SERPL-ACNC: 25.9 SEC — HIGH (ref 9.9–13.4)
PROTHROM AB SERPL-ACNC: 26.4 SEC — HIGH (ref 9.9–13.4)
PROTHROM AB SERPL-ACNC: 26.8 SEC — HIGH (ref 9.9–13.4)
PROTHROM AB SERPL-ACNC: 27 SEC — HIGH (ref 9.9–13.4)
PROTHROM AB SERPL-ACNC: 28 SEC — HIGH (ref 9.9–13.4)
PROTHROM AB SERPL-ACNC: 28 SEC — HIGH (ref 9.9–13.4)
PROTHROM AB SERPL-ACNC: 28.1 SEC — HIGH (ref 9.9–13.4)
PROTHROM AB SERPL-ACNC: 28.4 SEC — HIGH (ref 9.9–13.4)
PROTHROM AB SERPL-ACNC: 28.7 SEC — HIGH (ref 9.9–13.4)
PROTHROM AB SERPL-ACNC: 28.8 SEC — HIGH (ref 9.9–13.4)
PROTHROM AB SERPL-ACNC: 28.8 SEC — HIGH (ref 9.9–13.4)
PROTHROM AB SERPL-ACNC: 28.9 SEC — HIGH (ref 9.9–13.4)
PROTHROM AB SERPL-ACNC: 29 SEC — HIGH (ref 9.9–13.4)
PROTHROM AB SERPL-ACNC: 29.4 SEC — HIGH (ref 9.9–13.4)
PROTHROM AB SERPL-ACNC: 30.1 SEC — HIGH (ref 9.9–13.4)
PROTHROM AB SERPL-ACNC: 30.1 SEC — HIGH (ref 9.9–13.4)
PROTHROM AB SERPL-ACNC: 30.2 SEC — HIGH (ref 9.9–13.4)
PROTHROM AB SERPL-ACNC: 30.8 SEC — HIGH (ref 9.9–13.4)
PROTHROM AB SERPL-ACNC: 30.9 SEC — HIGH (ref 9.9–13.4)
PROTHROM AB SERPL-ACNC: 31.3 SEC — HIGH (ref 9.9–13.4)
PROTHROM AB SERPL-ACNC: 31.3 SEC — HIGH (ref 9.9–13.4)
PROTHROM AB SERPL-ACNC: 31.6 SEC — HIGH (ref 9.9–13.4)
PROTHROM AB SERPL-ACNC: 32.2 SEC — HIGH (ref 9.9–13.4)
PROTHROM AB SERPL-ACNC: 32.6 SEC — HIGH (ref 9.9–13.4)
PROTHROM AB SERPL-ACNC: 32.8 SEC — HIGH (ref 9.9–13.4)
PROTHROM AB SERPL-ACNC: 33.9 SEC — HIGH (ref 9.9–13.4)
PROTHROM AB SERPL-ACNC: 34.3 SEC — HIGH (ref 9.9–13.4)
PROTHROM AB SERPL-ACNC: 35.7 SEC — HIGH (ref 9.9–13.4)
PROTHROM AB SERPL-ACNC: 35.9 SEC — HIGH (ref 9.9–13.4)
PROTHROM AB SERPL-ACNC: 39.2 SEC — HIGH (ref 9.9–13.4)
RAPIDTEG MAXIMUM AMPLITUDE: 40.3 MM — LOW (ref 52–70)
RAPIDTEG MAXIMUM AMPLITUDE: 41.2 MM — LOW (ref 52–70)
RAPIDTEG MAXIMUM AMPLITUDE: 43.4 MM — LOW (ref 52–70)
RAPIDTEG MAXIMUM AMPLITUDE: 43.6 MM — LOW (ref 52–70)
RAPIDTEG MAXIMUM AMPLITUDE: 45.5 MM — LOW (ref 52–70)
RAPIDTEG MAXIMUM AMPLITUDE: 46.1 MM — LOW (ref 52–70)
RAPIDTEG MAXIMUM AMPLITUDE: 46.5 MM — LOW (ref 52–70)
RAPIDTEG MAXIMUM AMPLITUDE: 48.9 MM — LOW (ref 52–70)
RAPIDTEG MAXIMUM AMPLITUDE: 52.1 MM — SIGNIFICANT CHANGE UP (ref 52–70)
RAPIDTEG MAXIMUM AMPLITUDE: <40 MM — LOW (ref 52–70)
RBC # BLD: 1.96 M/UL — LOW (ref 4.2–5.8)
RBC # BLD: 2.06 M/UL — LOW (ref 4.2–5.8)
RBC # BLD: 2.17 M/UL — LOW (ref 4.2–5.8)
RBC # BLD: 2.2 M/UL — LOW (ref 4.2–5.8)
RBC # BLD: 2.33 M/UL — LOW (ref 4.2–5.8)
RBC # BLD: 2.38 M/UL — LOW (ref 4.2–5.8)
RBC # BLD: 2.42 M/UL — LOW (ref 4.2–5.8)
RBC # BLD: 2.43 M/UL — LOW (ref 4.2–5.8)
RBC # BLD: 2.44 M/UL — LOW (ref 4.2–5.8)
RBC # BLD: 2.48 M/UL — LOW (ref 4.2–5.8)
RBC # BLD: 2.52 M/UL — LOW (ref 4.2–5.8)
RBC # BLD: 2.56 M/UL — LOW (ref 4.2–5.8)
RBC # BLD: 2.57 M/UL — LOW (ref 4.2–5.8)
RBC # BLD: 2.57 M/UL — LOW (ref 4.2–5.8)
RBC # BLD: 2.59 M/UL — LOW (ref 4.2–5.8)
RBC # BLD: 2.6 M/UL — LOW (ref 4.2–5.8)
RBC # BLD: 2.6 M/UL — LOW (ref 4.2–5.8)
RBC # BLD: 2.63 M/UL — LOW (ref 4.2–5.8)
RBC # BLD: 2.63 M/UL — LOW (ref 4.2–5.8)
RBC # BLD: 2.65 M/UL — LOW (ref 4.2–5.8)
RBC # BLD: 2.66 M/UL — LOW (ref 4.2–5.8)
RBC # BLD: 2.66 M/UL — LOW (ref 4.2–5.8)
RBC # BLD: 2.67 M/UL — LOW (ref 4.2–5.8)
RBC # BLD: 2.68 M/UL — LOW (ref 4.2–5.8)
RBC # BLD: 2.68 M/UL — LOW (ref 4.2–5.8)
RBC # BLD: 2.71 M/UL — LOW (ref 4.2–5.8)
RBC # BLD: 2.71 M/UL — LOW (ref 4.2–5.8)
RBC # BLD: 2.72 M/UL — LOW (ref 4.2–5.8)
RBC # BLD: 2.73 M/UL — LOW (ref 4.2–5.8)
RBC # BLD: 2.73 M/UL — LOW (ref 4.2–5.8)
RBC # BLD: 2.75 M/UL — LOW (ref 4.2–5.8)
RBC # BLD: 2.76 M/UL — LOW (ref 4.2–5.8)
RBC # BLD: 2.78 M/UL — LOW (ref 4.2–5.8)
RBC # BLD: 2.78 M/UL — LOW (ref 4.2–5.8)
RBC # BLD: 2.8 M/UL — LOW (ref 4.2–5.8)
RBC # BLD: 2.81 M/UL — LOW (ref 4.2–5.8)
RBC # BLD: 2.83 M/UL — LOW (ref 4.2–5.8)
RBC # BLD: 2.84 M/UL — LOW (ref 4.2–5.8)
RBC # BLD: 2.86 M/UL — LOW (ref 4.2–5.8)
RBC # BLD: 2.87 M/UL — LOW (ref 4.2–5.8)
RBC # BLD: 2.89 M/UL — LOW (ref 4.2–5.8)
RBC # BLD: 2.9 M/UL — LOW (ref 4.2–5.8)
RBC # BLD: 2.91 M/UL — LOW (ref 4.2–5.8)
RBC # BLD: 2.92 M/UL — LOW (ref 4.2–5.8)
RBC # BLD: 2.94 M/UL — LOW (ref 4.2–5.8)
RBC # BLD: 2.95 M/UL — LOW (ref 4.2–5.8)
RBC # BLD: 2.95 M/UL — LOW (ref 4.2–5.8)
RBC # BLD: 2.96 M/UL — LOW (ref 4.2–5.8)
RBC # BLD: 2.98 M/UL — LOW (ref 4.2–5.8)
RBC # BLD: 2.99 M/UL — LOW (ref 4.2–5.8)
RBC # BLD: 3.01 M/UL — LOW (ref 4.2–5.8)
RBC # BLD: 3.01 M/UL — LOW (ref 4.2–5.8)
RBC # BLD: 3.02 M/UL — LOW (ref 4.2–5.8)
RBC # BLD: 3.03 M/UL — LOW (ref 4.2–5.8)
RBC # BLD: 3.03 M/UL — LOW (ref 4.2–5.8)
RBC # BLD: 3.05 M/UL — LOW (ref 4.2–5.8)
RBC # BLD: 3.05 M/UL — LOW (ref 4.2–5.8)
RBC # BLD: 3.07 M/UL — LOW (ref 4.2–5.8)
RBC # BLD: 3.08 M/UL — LOW (ref 4.2–5.8)
RBC # BLD: 3.09 M/UL — LOW (ref 4.2–5.8)
RBC # BLD: 3.1 M/UL — LOW (ref 4.2–5.8)
RBC # BLD: 3.1 M/UL — LOW (ref 4.2–5.8)
RBC # BLD: 3.11 M/UL — LOW (ref 4.2–5.8)
RBC # BLD: 3.12 M/UL — LOW (ref 4.2–5.8)
RBC # BLD: 3.2 M/UL — LOW (ref 4.2–5.8)
RBC # BLD: 3.43 M/UL — LOW (ref 4.2–5.8)
RBC # FLD: 17.8 % — HIGH (ref 10.3–14.5)
RBC # FLD: 18 % — HIGH (ref 10.3–14.5)
RBC # FLD: 18.6 % — HIGH (ref 10.3–14.5)
RBC # FLD: 18.6 % — HIGH (ref 10.3–14.5)
RBC # FLD: 18.7 % — HIGH (ref 10.3–14.5)
RBC # FLD: 18.8 % — HIGH (ref 10.3–14.5)
RBC # FLD: 18.9 % — HIGH (ref 10.3–14.5)
RBC # FLD: 19 % — HIGH (ref 10.3–14.5)
RBC # FLD: 19 % — HIGH (ref 10.3–14.5)
RBC # FLD: 19.3 % — HIGH (ref 10.3–14.5)
RBC # FLD: 19.3 % — HIGH (ref 10.3–14.5)
RBC # FLD: 19.4 % — HIGH (ref 10.3–14.5)
RBC # FLD: 19.4 % — HIGH (ref 10.3–14.5)
RBC # FLD: 19.5 % — HIGH (ref 10.3–14.5)
RBC # FLD: 19.6 % — HIGH (ref 10.3–14.5)
RBC # FLD: 19.7 % — HIGH (ref 10.3–14.5)
RBC # FLD: 19.8 % — HIGH (ref 10.3–14.5)
RBC # FLD: 19.9 % — HIGH (ref 10.3–14.5)
RBC # FLD: 20 % — HIGH (ref 10.3–14.5)
RBC # FLD: 20.1 % — HIGH (ref 10.3–14.5)
RBC # FLD: 20.3 % — HIGH (ref 10.3–14.5)
RBC # FLD: 20.7 % — HIGH (ref 10.3–14.5)
RBC # FLD: 20.7 % — HIGH (ref 10.3–14.5)
RBC # FLD: 20.8 % — HIGH (ref 10.3–14.5)
RBC # FLD: 20.9 % — HIGH (ref 10.3–14.5)
RBC # FLD: 21 % — HIGH (ref 10.3–14.5)
RBC # FLD: 21.2 % — HIGH (ref 10.3–14.5)
RBC # FLD: 21.4 % — HIGH (ref 10.3–14.5)
RBC # FLD: 21.4 % — HIGH (ref 10.3–14.5)
RBC # FLD: 21.6 % — HIGH (ref 10.3–14.5)
RBC # FLD: 22 % — HIGH (ref 10.3–14.5)
RBC # FLD: 22.1 % — HIGH (ref 10.3–14.5)
RBC # FLD: 22.2 % — HIGH (ref 10.3–14.5)
RBC # FLD: 22.2 % — HIGH (ref 10.3–14.5)
RBC # FLD: 22.3 % — HIGH (ref 10.3–14.5)
RBC # FLD: 22.6 % — HIGH (ref 10.3–14.5)
RBC # FLD: 22.7 % — HIGH (ref 10.3–14.5)
RBC # FLD: 22.8 % — HIGH (ref 10.3–14.5)
RBC # FLD: 22.9 % — HIGH (ref 10.3–14.5)
RBC # FLD: 22.9 % — HIGH (ref 10.3–14.5)
RBC # FLD: 23 % — HIGH (ref 10.3–14.5)
RBC # FLD: 23.1 % — HIGH (ref 10.3–14.5)
RBC # FLD: 23.3 % — HIGH (ref 10.3–14.5)
RBC # FLD: 23.4 % — HIGH (ref 10.3–14.5)
RBC # FLD: 23.4 % — HIGH (ref 10.3–14.5)
RBC # FLD: 23.7 % — HIGH (ref 10.3–14.5)
RBC # FLD: 23.9 % — HIGH (ref 10.3–14.5)
RBC # FLD: 23.9 % — HIGH (ref 10.3–14.5)
RBC # FLD: 24.2 % — HIGH (ref 10.3–14.5)
RBC # FLD: 24.3 % — HIGH (ref 10.3–14.5)
RBC # FLD: 24.3 % — HIGH (ref 10.3–14.5)
RBC # FLD: 24.4 % — HIGH (ref 10.3–14.5)
RBC # FLD: 24.5 % — HIGH (ref 10.3–14.5)
RBC # FLD: 24.6 % — HIGH (ref 10.3–14.5)
RBC # FLD: 24.8 % — HIGH (ref 10.3–14.5)
RBC # FLD: 25.1 % — HIGH (ref 10.3–14.5)
RBC # FLD: 25.1 % — HIGH (ref 10.3–14.5)
RBC # FLD: 25.3 % — HIGH (ref 10.3–14.5)
RBC # FLD: 25.4 % — HIGH (ref 10.3–14.5)
RBC # FLD: 25.4 % — HIGH (ref 10.3–14.5)
RBC # FLD: 25.7 % — HIGH (ref 10.3–14.5)
RBC # FLD: 26.2 % — HIGH (ref 10.3–14.5)
RBC BLD AUTO: SIGNIFICANT CHANGE UP
RETICS #: 138.1 K/UL — HIGH (ref 25–125)
RETICS/RBC NFR: 4.7 % — HIGH (ref 0.5–2.5)
RH IG SCN BLD-IMP: NEGATIVE — SIGNIFICANT CHANGE UP
SMUDGE CELLS # BLD: PRESENT — SIGNIFICANT CHANGE UP
SODIUM SERPL-SCNC: 132 MMOL/L — LOW (ref 135–145)
SODIUM SERPL-SCNC: 133 MMOL/L — LOW (ref 135–145)
SODIUM SERPL-SCNC: 134 MMOL/L — LOW (ref 135–145)
SODIUM SERPL-SCNC: 135 MMOL/L — SIGNIFICANT CHANGE UP (ref 135–145)
SODIUM SERPL-SCNC: 136 MMOL/L — SIGNIFICANT CHANGE UP (ref 135–145)
SODIUM SERPL-SCNC: 137 MMOL/L — SIGNIFICANT CHANGE UP (ref 135–145)
SODIUM SERPL-SCNC: 138 MMOL/L — SIGNIFICANT CHANGE UP (ref 135–145)
SODIUM SERPL-SCNC: 139 MMOL/L — SIGNIFICANT CHANGE UP (ref 135–145)
SODIUM SERPL-SCNC: 140 MMOL/L — SIGNIFICANT CHANGE UP (ref 135–145)
SODIUM SERPL-SCNC: 141 MMOL/L — SIGNIFICANT CHANGE UP (ref 135–145)
SODIUM SERPL-SCNC: 142 MMOL/L — SIGNIFICANT CHANGE UP (ref 135–145)
SODIUM SERPL-SCNC: 142 MMOL/L — SIGNIFICANT CHANGE UP (ref 135–145)
SODIUM SERPL-SCNC: 143 MMOL/L — SIGNIFICANT CHANGE UP (ref 135–145)
SODIUM SERPL-SCNC: 144 MMOL/L — SIGNIFICANT CHANGE UP (ref 135–145)
SODIUM SERPL-SCNC: 145 MMOL/L — SIGNIFICANT CHANGE UP (ref 135–145)
SODIUM SERPL-SCNC: 146 MMOL/L — HIGH (ref 135–145)
SODIUM SERPL-SCNC: 146 MMOL/L — HIGH (ref 135–145)
SPECIMEN SOURCE: SIGNIFICANT CHANGE UP
TEG FUNCTIONAL FIBRINOGEN: 10.7 MM — LOW (ref 15–32)
TEG FUNCTIONAL FIBRINOGEN: 10.7 MM — LOW (ref 15–32)
TEG FUNCTIONAL FIBRINOGEN: 11.7 MM — LOW (ref 15–32)
TEG FUNCTIONAL FIBRINOGEN: 12.1 MM — LOW (ref 15–32)
TEG FUNCTIONAL FIBRINOGEN: 12.2 MM — LOW (ref 15–32)
TEG FUNCTIONAL FIBRINOGEN: 12.4 MM — LOW (ref 15–32)
TEG FUNCTIONAL FIBRINOGEN: 12.6 MM — LOW (ref 15–32)
TEG FUNCTIONAL FIBRINOGEN: 14.5 MM — LOW (ref 15–32)
TEG FUNCTIONAL FIBRINOGEN: 14.5 MM — LOW (ref 15–32)
TEG FUNCTIONAL FIBRINOGEN: 15.3 MM — SIGNIFICANT CHANGE UP (ref 15–32)
TEG FUNCTIONAL FIBRINOGEN: 15.8 MM — SIGNIFICANT CHANGE UP (ref 15–32)
TEG FUNCTIONAL FIBRINOGEN: 16.1 MM — SIGNIFICANT CHANGE UP (ref 15–32)
TEG FUNCTIONAL FIBRINOGEN: 16.5 MM — SIGNIFICANT CHANGE UP (ref 15–32)
TEG FUNCTIONAL FIBRINOGEN: 16.6 MM — SIGNIFICANT CHANGE UP (ref 15–32)
TEG FUNCTIONAL FIBRINOGEN: 18 MM — SIGNIFICANT CHANGE UP (ref 15–32)
TEG FUNCTIONAL FIBRINOGEN: 4 MM — LOW (ref 15–32)
TEG FUNCTIONAL FIBRINOGEN: 6 MM — LOW (ref 15–32)
TEG FUNCTIONAL FIBRINOGEN: 8.6 MM — LOW (ref 15–32)
TEG FUNCTIONAL FIBRINOGEN: 8.8 MM — LOW (ref 15–32)
TEG FUNCTIONAL FIBRINOGEN: 9 MM — LOW (ref 15–32)
TEG FUNCTIONAL FIBRINOGEN: 9.2 MM — LOW (ref 15–32)
TEG FUNCTIONAL FIBRINOGEN: 9.5 MM — LOW (ref 15–32)
TEG FUNCTIONAL FIBRINOGEN: <4 MM — LOW (ref 15–32)
TEG LY30 (LYSIS): 0 % — SIGNIFICANT CHANGE UP (ref 0–2.6)
TEG REACTION TIME: 4.9 MIN — SIGNIFICANT CHANGE UP (ref 4.6–9.1)
TEG REACTION TIME: 5.2 MIN — SIGNIFICANT CHANGE UP (ref 4.6–9.1)
TEG REACTION TIME: 5.3 MIN — SIGNIFICANT CHANGE UP (ref 4.6–9.1)
TEG REACTION TIME: 5.8 MIN — SIGNIFICANT CHANGE UP (ref 4.6–9.1)
TEG REACTION TIME: 6.2 MIN — SIGNIFICANT CHANGE UP (ref 4.6–9.1)
TEG REACTION TIME: 6.3 MIN — SIGNIFICANT CHANGE UP (ref 4.6–9.1)
TEG REACTION TIME: 6.4 MIN — SIGNIFICANT CHANGE UP (ref 4.6–9.1)
TEG REACTION TIME: 6.7 MIN — SIGNIFICANT CHANGE UP (ref 4.6–9.1)
TEG REACTION TIME: 6.8 MIN — SIGNIFICANT CHANGE UP (ref 4.6–9.1)
TEG REACTION TIME: 7.2 MIN — SIGNIFICANT CHANGE UP (ref 4.6–9.1)
TEG REACTION TIME: 7.2 MIN — SIGNIFICANT CHANGE UP (ref 4.6–9.1)
TEG REACTION TIME: 7.6 MIN — SIGNIFICANT CHANGE UP (ref 4.6–9.1)
TEG REACTION TIME: 7.6 MIN — SIGNIFICANT CHANGE UP (ref 4.6–9.1)
TEG REACTION TIME: 7.7 MIN — SIGNIFICANT CHANGE UP (ref 4.6–9.1)
TEG REACTION TIME: 7.8 MIN — SIGNIFICANT CHANGE UP (ref 4.6–9.1)
TEG REACTION TIME: 8 MIN — SIGNIFICANT CHANGE UP (ref 4.6–9.1)
TEG REACTION TIME: 8.2 MIN — SIGNIFICANT CHANGE UP (ref 4.6–9.1)
TEG REACTION TIME: 8.2 MIN — SIGNIFICANT CHANGE UP (ref 4.6–9.1)
TEG REACTION TIME: 8.3 MIN — SIGNIFICANT CHANGE UP (ref 4.6–9.1)
TEG REACTION TIME: 8.7 MIN — SIGNIFICANT CHANGE UP (ref 4.6–9.1)
TIBC SERPL-MCNC: SIGNIFICANT CHANGE UP UG/DL (ref 220–430)
TRANSFERRIN SERPL-MCNC: 54 MG/DL — LOW (ref 200–360)
TROPONIN T, HIGH SENSITIVITY RESULT: 1824 NG/L — HIGH (ref 0–51)
TROPONIN T, HIGH SENSITIVITY RESULT: 1920 NG/L — HIGH (ref 0–51)
TROPONIN T, HIGH SENSITIVITY RESULT: 1999 NG/L — HIGH (ref 0–51)
TROPONIN T, HIGH SENSITIVITY RESULT: 2386 NG/L — HIGH (ref 0–51)
UIBC SERPL-MCNC: <20 UG/DL — LOW (ref 110–370)
VORICONAZOLE SERPL-MCNC: 5.5 MCG/ML — SIGNIFICANT CHANGE UP (ref 1–5.5)
VORICONAZOLE SERPL-MCNC: 5.7 MCG/ML — HIGH (ref 1–5.5)
VORICONAZOLE SERPL-MCNC: 5.8 MCG/ML — HIGH (ref 1–5.5)
VORICONAZOLE SERPL-MCNC: 7.7 MCG/ML — HIGH (ref 1–5.5)
VORICONAZOLE SERPL-MCNC: 8 MCG/ML — HIGH (ref 1–5.5)
WBC # BLD: 0.56 K/UL — CRITICAL LOW (ref 3.8–10.5)
WBC # BLD: 0.56 K/UL — CRITICAL LOW (ref 3.8–10.5)
WBC # BLD: 0.59 K/UL — CRITICAL LOW (ref 3.8–10.5)
WBC # BLD: 0.63 K/UL — CRITICAL LOW (ref 3.8–10.5)
WBC # BLD: 0.63 K/UL — CRITICAL LOW (ref 3.8–10.5)
WBC # BLD: 0.65 K/UL — CRITICAL LOW (ref 3.8–10.5)
WBC # BLD: 0.66 K/UL — CRITICAL LOW (ref 3.8–10.5)
WBC # BLD: 0.67 K/UL — CRITICAL LOW (ref 3.8–10.5)
WBC # BLD: 0.68 K/UL — CRITICAL LOW (ref 3.8–10.5)
WBC # BLD: 0.7 K/UL — CRITICAL LOW (ref 3.8–10.5)
WBC # BLD: 0.73 K/UL — CRITICAL LOW (ref 3.8–10.5)
WBC # BLD: 0.73 K/UL — CRITICAL LOW (ref 3.8–10.5)
WBC # BLD: 0.74 K/UL — CRITICAL LOW (ref 3.8–10.5)
WBC # BLD: 0.77 K/UL — CRITICAL LOW (ref 3.8–10.5)
WBC # BLD: 0.79 K/UL — CRITICAL LOW (ref 3.8–10.5)
WBC # BLD: 0.81 K/UL — CRITICAL LOW (ref 3.8–10.5)
WBC # BLD: 0.82 K/UL — CRITICAL LOW (ref 3.8–10.5)
WBC # BLD: 0.91 K/UL — CRITICAL LOW (ref 3.8–10.5)
WBC # BLD: 0.96 K/UL — CRITICAL LOW (ref 3.8–10.5)
WBC # BLD: 0.97 K/UL — CRITICAL LOW (ref 3.8–10.5)
WBC # BLD: 1.05 K/UL — LOW (ref 3.8–10.5)
WBC # BLD: 1.08 K/UL — LOW (ref 3.8–10.5)
WBC # BLD: 1.11 K/UL — LOW (ref 3.8–10.5)
WBC # BLD: 1.15 K/UL — LOW (ref 3.8–10.5)
WBC # BLD: 1.18 K/UL — LOW (ref 3.8–10.5)
WBC # BLD: 1.19 K/UL — LOW (ref 3.8–10.5)
WBC # BLD: 1.19 K/UL — LOW (ref 3.8–10.5)
WBC # BLD: 1.24 K/UL — LOW (ref 3.8–10.5)
WBC # BLD: 1.32 K/UL — LOW (ref 3.8–10.5)
WBC # BLD: 1.6 K/UL — LOW (ref 3.8–10.5)
WBC # BLD: 1.71 K/UL — LOW (ref 3.8–10.5)
WBC # BLD: 1.79 K/UL — LOW (ref 3.8–10.5)
WBC # BLD: 1.83 K/UL — LOW (ref 3.8–10.5)
WBC # BLD: 1.92 K/UL — LOW (ref 3.8–10.5)
WBC # BLD: 1.99 K/UL — LOW (ref 3.8–10.5)
WBC # BLD: 2.07 K/UL — LOW (ref 3.8–10.5)
WBC # BLD: 2.19 K/UL — LOW (ref 3.8–10.5)
WBC # BLD: 2.3 K/UL — LOW (ref 3.8–10.5)
WBC # BLD: 2.33 K/UL — LOW (ref 3.8–10.5)
WBC # BLD: 2.43 K/UL — LOW (ref 3.8–10.5)
WBC # BLD: 2.6 K/UL — LOW (ref 3.8–10.5)
WBC # BLD: 2.61 K/UL — LOW (ref 3.8–10.5)
WBC # BLD: 2.66 K/UL — LOW (ref 3.8–10.5)
WBC # BLD: 2.77 K/UL — LOW (ref 3.8–10.5)
WBC # BLD: 2.78 K/UL — LOW (ref 3.8–10.5)
WBC # BLD: 2.78 K/UL — LOW (ref 3.8–10.5)
WBC # BLD: 2.81 K/UL — LOW (ref 3.8–10.5)
WBC # BLD: 2.82 K/UL — LOW (ref 3.8–10.5)
WBC # BLD: 2.86 K/UL — LOW (ref 3.8–10.5)
WBC # BLD: 2.9 K/UL — LOW (ref 3.8–10.5)
WBC # BLD: 2.96 K/UL — LOW (ref 3.8–10.5)
WBC # BLD: 3.09 K/UL — LOW (ref 3.8–10.5)
WBC # BLD: 3.13 K/UL — LOW (ref 3.8–10.5)
WBC # BLD: 3.18 K/UL — LOW (ref 3.8–10.5)
WBC # BLD: 3.3 K/UL — LOW (ref 3.8–10.5)
WBC # BLD: 3.38 K/UL — LOW (ref 3.8–10.5)
WBC # BLD: 3.55 K/UL — LOW (ref 3.8–10.5)
WBC # BLD: 3.65 K/UL — LOW (ref 3.8–10.5)
WBC # BLD: 3.68 K/UL — LOW (ref 3.8–10.5)
WBC # BLD: 3.77 K/UL — LOW (ref 3.8–10.5)
WBC # BLD: 3.86 K/UL — SIGNIFICANT CHANGE UP (ref 3.8–10.5)
WBC # BLD: 3.94 K/UL — SIGNIFICANT CHANGE UP (ref 3.8–10.5)
WBC # BLD: 4 K/UL — SIGNIFICANT CHANGE UP (ref 3.8–10.5)
WBC # BLD: 4.03 K/UL — SIGNIFICANT CHANGE UP (ref 3.8–10.5)
WBC # BLD: 4.06 K/UL — SIGNIFICANT CHANGE UP (ref 3.8–10.5)
WBC # BLD: 4.42 K/UL — SIGNIFICANT CHANGE UP (ref 3.8–10.5)
WBC # BLD: 4.47 K/UL — SIGNIFICANT CHANGE UP (ref 3.8–10.5)
WBC # BLD: 4.6 K/UL — SIGNIFICANT CHANGE UP (ref 3.8–10.5)
WBC # BLD: 4.61 K/UL — SIGNIFICANT CHANGE UP (ref 3.8–10.5)
WBC # BLD: 4.63 K/UL — SIGNIFICANT CHANGE UP (ref 3.8–10.5)
WBC # BLD: 4.74 K/UL — SIGNIFICANT CHANGE UP (ref 3.8–10.5)
WBC # BLD: 4.77 K/UL — SIGNIFICANT CHANGE UP (ref 3.8–10.5)
WBC # BLD: 4.93 K/UL — SIGNIFICANT CHANGE UP (ref 3.8–10.5)
WBC # BLD: 5.11 K/UL — SIGNIFICANT CHANGE UP (ref 3.8–10.5)
WBC # BLD: 5.12 K/UL — SIGNIFICANT CHANGE UP (ref 3.8–10.5)
WBC # BLD: 5.32 K/UL — SIGNIFICANT CHANGE UP (ref 3.8–10.5)
WBC # BLD: 5.89 K/UL — SIGNIFICANT CHANGE UP (ref 3.8–10.5)
WBC # BLD: 6.29 K/UL — SIGNIFICANT CHANGE UP (ref 3.8–10.5)
WBC # BLD: 6.34 K/UL — SIGNIFICANT CHANGE UP (ref 3.8–10.5)
WBC # BLD: 6.45 K/UL — SIGNIFICANT CHANGE UP (ref 3.8–10.5)
WBC # BLD: 6.49 K/UL — SIGNIFICANT CHANGE UP (ref 3.8–10.5)
WBC # BLD: 6.51 K/UL — SIGNIFICANT CHANGE UP (ref 3.8–10.5)
WBC # BLD: 6.84 K/UL — SIGNIFICANT CHANGE UP (ref 3.8–10.5)
WBC # FLD AUTO: 0.56 K/UL — CRITICAL LOW (ref 3.8–10.5)
WBC # FLD AUTO: 0.56 K/UL — CRITICAL LOW (ref 3.8–10.5)
WBC # FLD AUTO: 0.59 K/UL — CRITICAL LOW (ref 3.8–10.5)
WBC # FLD AUTO: 0.63 K/UL — CRITICAL LOW (ref 3.8–10.5)
WBC # FLD AUTO: 0.63 K/UL — CRITICAL LOW (ref 3.8–10.5)
WBC # FLD AUTO: 0.65 K/UL — CRITICAL LOW (ref 3.8–10.5)
WBC # FLD AUTO: 0.66 K/UL — CRITICAL LOW (ref 3.8–10.5)
WBC # FLD AUTO: 0.67 K/UL — CRITICAL LOW (ref 3.8–10.5)
WBC # FLD AUTO: 0.68 K/UL — CRITICAL LOW (ref 3.8–10.5)
WBC # FLD AUTO: 0.7 K/UL — CRITICAL LOW (ref 3.8–10.5)
WBC # FLD AUTO: 0.73 K/UL — CRITICAL LOW (ref 3.8–10.5)
WBC # FLD AUTO: 0.73 K/UL — CRITICAL LOW (ref 3.8–10.5)
WBC # FLD AUTO: 0.74 K/UL — CRITICAL LOW (ref 3.8–10.5)
WBC # FLD AUTO: 0.77 K/UL — CRITICAL LOW (ref 3.8–10.5)
WBC # FLD AUTO: 0.79 K/UL — CRITICAL LOW (ref 3.8–10.5)
WBC # FLD AUTO: 0.81 K/UL — CRITICAL LOW (ref 3.8–10.5)
WBC # FLD AUTO: 0.82 K/UL — CRITICAL LOW (ref 3.8–10.5)
WBC # FLD AUTO: 0.91 K/UL — CRITICAL LOW (ref 3.8–10.5)
WBC # FLD AUTO: 0.96 K/UL — CRITICAL LOW (ref 3.8–10.5)
WBC # FLD AUTO: 0.97 K/UL — CRITICAL LOW (ref 3.8–10.5)
WBC # FLD AUTO: 1.05 K/UL — LOW (ref 3.8–10.5)
WBC # FLD AUTO: 1.08 K/UL — LOW (ref 3.8–10.5)
WBC # FLD AUTO: 1.11 K/UL — LOW (ref 3.8–10.5)
WBC # FLD AUTO: 1.15 K/UL — LOW (ref 3.8–10.5)
WBC # FLD AUTO: 1.18 K/UL — LOW (ref 3.8–10.5)
WBC # FLD AUTO: 1.19 K/UL — LOW (ref 3.8–10.5)
WBC # FLD AUTO: 1.19 K/UL — LOW (ref 3.8–10.5)
WBC # FLD AUTO: 1.24 K/UL — LOW (ref 3.8–10.5)
WBC # FLD AUTO: 1.32 K/UL — LOW (ref 3.8–10.5)
WBC # FLD AUTO: 1.6 K/UL — LOW (ref 3.8–10.5)
WBC # FLD AUTO: 1.71 K/UL — LOW (ref 3.8–10.5)
WBC # FLD AUTO: 1.79 K/UL — LOW (ref 3.8–10.5)
WBC # FLD AUTO: 1.83 K/UL — LOW (ref 3.8–10.5)
WBC # FLD AUTO: 1.92 K/UL — LOW (ref 3.8–10.5)
WBC # FLD AUTO: 1.99 K/UL — LOW (ref 3.8–10.5)
WBC # FLD AUTO: 2.07 K/UL — LOW (ref 3.8–10.5)
WBC # FLD AUTO: 2.19 K/UL — LOW (ref 3.8–10.5)
WBC # FLD AUTO: 2.3 K/UL — LOW (ref 3.8–10.5)
WBC # FLD AUTO: 2.33 K/UL — LOW (ref 3.8–10.5)
WBC # FLD AUTO: 2.43 K/UL — LOW (ref 3.8–10.5)
WBC # FLD AUTO: 2.6 K/UL — LOW (ref 3.8–10.5)
WBC # FLD AUTO: 2.61 K/UL — LOW (ref 3.8–10.5)
WBC # FLD AUTO: 2.66 K/UL — LOW (ref 3.8–10.5)
WBC # FLD AUTO: 2.77 K/UL — LOW (ref 3.8–10.5)
WBC # FLD AUTO: 2.78 K/UL — LOW (ref 3.8–10.5)
WBC # FLD AUTO: 2.78 K/UL — LOW (ref 3.8–10.5)
WBC # FLD AUTO: 2.81 K/UL — LOW (ref 3.8–10.5)
WBC # FLD AUTO: 2.82 K/UL — LOW (ref 3.8–10.5)
WBC # FLD AUTO: 2.86 K/UL — LOW (ref 3.8–10.5)
WBC # FLD AUTO: 2.9 K/UL — LOW (ref 3.8–10.5)
WBC # FLD AUTO: 2.96 K/UL — LOW (ref 3.8–10.5)
WBC # FLD AUTO: 3.09 K/UL — LOW (ref 3.8–10.5)
WBC # FLD AUTO: 3.13 K/UL — LOW (ref 3.8–10.5)
WBC # FLD AUTO: 3.18 K/UL — LOW (ref 3.8–10.5)
WBC # FLD AUTO: 3.3 K/UL — LOW (ref 3.8–10.5)
WBC # FLD AUTO: 3.38 K/UL — LOW (ref 3.8–10.5)
WBC # FLD AUTO: 3.55 K/UL — LOW (ref 3.8–10.5)
WBC # FLD AUTO: 3.65 K/UL — LOW (ref 3.8–10.5)
WBC # FLD AUTO: 3.68 K/UL — LOW (ref 3.8–10.5)
WBC # FLD AUTO: 3.77 K/UL — LOW (ref 3.8–10.5)
WBC # FLD AUTO: 3.86 K/UL — SIGNIFICANT CHANGE UP (ref 3.8–10.5)
WBC # FLD AUTO: 3.94 K/UL — SIGNIFICANT CHANGE UP (ref 3.8–10.5)
WBC # FLD AUTO: 4 K/UL — SIGNIFICANT CHANGE UP (ref 3.8–10.5)
WBC # FLD AUTO: 4.03 K/UL — SIGNIFICANT CHANGE UP (ref 3.8–10.5)
WBC # FLD AUTO: 4.06 K/UL — SIGNIFICANT CHANGE UP (ref 3.8–10.5)
WBC # FLD AUTO: 4.42 K/UL — SIGNIFICANT CHANGE UP (ref 3.8–10.5)
WBC # FLD AUTO: 4.47 K/UL — SIGNIFICANT CHANGE UP (ref 3.8–10.5)
WBC # FLD AUTO: 4.6 K/UL — SIGNIFICANT CHANGE UP (ref 3.8–10.5)
WBC # FLD AUTO: 4.61 K/UL — SIGNIFICANT CHANGE UP (ref 3.8–10.5)
WBC # FLD AUTO: 4.63 K/UL — SIGNIFICANT CHANGE UP (ref 3.8–10.5)
WBC # FLD AUTO: 4.74 K/UL — SIGNIFICANT CHANGE UP (ref 3.8–10.5)
WBC # FLD AUTO: 4.77 K/UL — SIGNIFICANT CHANGE UP (ref 3.8–10.5)
WBC # FLD AUTO: 4.93 K/UL — SIGNIFICANT CHANGE UP (ref 3.8–10.5)
WBC # FLD AUTO: 5.11 K/UL — SIGNIFICANT CHANGE UP (ref 3.8–10.5)
WBC # FLD AUTO: 5.12 K/UL — SIGNIFICANT CHANGE UP (ref 3.8–10.5)
WBC # FLD AUTO: 5.32 K/UL — SIGNIFICANT CHANGE UP (ref 3.8–10.5)
WBC # FLD AUTO: 5.89 K/UL — SIGNIFICANT CHANGE UP (ref 3.8–10.5)
WBC # FLD AUTO: 6.29 K/UL — SIGNIFICANT CHANGE UP (ref 3.8–10.5)
WBC # FLD AUTO: 6.34 K/UL — SIGNIFICANT CHANGE UP (ref 3.8–10.5)
WBC # FLD AUTO: 6.45 K/UL — SIGNIFICANT CHANGE UP (ref 3.8–10.5)
WBC # FLD AUTO: 6.49 K/UL — SIGNIFICANT CHANGE UP (ref 3.8–10.5)
WBC # FLD AUTO: 6.51 K/UL — SIGNIFICANT CHANGE UP (ref 3.8–10.5)
WBC # FLD AUTO: 6.84 K/UL — SIGNIFICANT CHANGE UP (ref 3.8–10.5)

## 2025-01-01 PROCEDURE — 97602 WOUND(S) CARE NON-SELECTIVE: CPT

## 2025-01-01 PROCEDURE — 90945 DIALYSIS ONE EVALUATION: CPT

## 2025-01-01 PROCEDURE — 93975 VASCULAR STUDY: CPT

## 2025-01-01 PROCEDURE — 99232 SBSQ HOSP IP/OBS MODERATE 35: CPT | Mod: FS,GC,24

## 2025-01-01 PROCEDURE — 85730 THROMBOPLASTIN TIME PARTIAL: CPT

## 2025-01-01 PROCEDURE — 87324 CLOSTRIDIUM AG IA: CPT

## 2025-01-01 PROCEDURE — 71045 X-RAY EXAM CHEST 1 VIEW: CPT | Mod: 26

## 2025-01-01 PROCEDURE — 99233 SBSQ HOSP IP/OBS HIGH 50: CPT | Mod: GC

## 2025-01-01 PROCEDURE — 88300 SURGICAL PATH GROSS: CPT

## 2025-01-01 PROCEDURE — 93306 TTE W/DOPPLER COMPLETE: CPT | Mod: 26

## 2025-01-01 PROCEDURE — G0545: CPT

## 2025-01-01 PROCEDURE — 80202 ASSAY OF VANCOMYCIN: CPT

## 2025-01-01 PROCEDURE — 93306 TTE W/DOPPLER COMPLETE: CPT

## 2025-01-01 PROCEDURE — 85014 HEMATOCRIT: CPT

## 2025-01-01 PROCEDURE — 87449 NOS EACH ORGANISM AG IA: CPT

## 2025-01-01 PROCEDURE — 87517 HEPATITIS B DNA QUANT: CPT

## 2025-01-01 PROCEDURE — C1751: CPT

## 2025-01-01 PROCEDURE — 99233 SBSQ HOSP IP/OBS HIGH 50: CPT

## 2025-01-01 PROCEDURE — 36514 APHERESIS PLASMA: CPT

## 2025-01-01 PROCEDURE — C8924: CPT

## 2025-01-01 PROCEDURE — 36415 COLL VENOUS BLD VENIPUNCTURE: CPT

## 2025-01-01 PROCEDURE — 87536 HIV-1 QUANT&REVRSE TRNSCRPJ: CPT

## 2025-01-01 PROCEDURE — 99291 CRITICAL CARE FIRST HOUR: CPT

## 2025-01-01 PROCEDURE — 99261: CPT

## 2025-01-01 PROCEDURE — 99232 SBSQ HOSP IP/OBS MODERATE 35: CPT

## 2025-01-01 PROCEDURE — 99291 CRITICAL CARE FIRST HOUR: CPT | Mod: 24

## 2025-01-01 PROCEDURE — 87150 DNA/RNA AMPLIFIED PROBE: CPT

## 2025-01-01 PROCEDURE — 74176 CT ABD & PELVIS W/O CONTRAST: CPT | Mod: 26

## 2025-01-01 PROCEDURE — 90945 DIALYSIS ONE EVALUATION: CPT | Mod: GC

## 2025-01-01 PROCEDURE — 83690 ASSAY OF LIPASE: CPT

## 2025-01-01 PROCEDURE — P9011: CPT

## 2025-01-01 PROCEDURE — 86900 BLOOD TYPING SEROLOGIC ABO: CPT

## 2025-01-01 PROCEDURE — P9047: CPT

## 2025-01-01 PROCEDURE — C9399: CPT

## 2025-01-01 PROCEDURE — 84100 ASSAY OF PHOSPHORUS: CPT

## 2025-01-01 PROCEDURE — P9059: CPT

## 2025-01-01 PROCEDURE — 82248 BILIRUBIN DIRECT: CPT

## 2025-01-01 PROCEDURE — 74018 RADEX ABDOMEN 1 VIEW: CPT | Mod: 26

## 2025-01-01 PROCEDURE — 82010 KETONE BODYS QUAN: CPT

## 2025-01-01 PROCEDURE — 82553 CREATINE MB FRACTION: CPT

## 2025-01-01 PROCEDURE — 93010 ELECTROCARDIOGRAM REPORT: CPT

## 2025-01-01 PROCEDURE — 86923 COMPATIBILITY TEST ELECTRIC: CPT

## 2025-01-01 PROCEDURE — 86850 RBC ANTIBODY SCREEN: CPT

## 2025-01-01 PROCEDURE — C1894: CPT

## 2025-01-01 PROCEDURE — 80285 DRUG ASSAY VORICONAZOLE: CPT

## 2025-01-01 PROCEDURE — 71260 CT THORAX DX C+: CPT | Mod: 26

## 2025-01-01 PROCEDURE — C1889: CPT

## 2025-01-01 PROCEDURE — 71045 X-RAY EXAM CHEST 1 VIEW: CPT | Mod: 26,76

## 2025-01-01 PROCEDURE — 93325 DOPPLER ECHO COLOR FLOW MAPG: CPT

## 2025-01-01 PROCEDURE — 82787 IGG 1 2 3 OR 4 EACH: CPT

## 2025-01-01 PROCEDURE — 31720 CLEARANCE OF AIRWAYS: CPT

## 2025-01-01 PROCEDURE — 99232 SBSQ HOSP IP/OBS MODERATE 35: CPT | Mod: 24

## 2025-01-01 PROCEDURE — 87103 BLOOD FUNGUS CULTURE: CPT

## 2025-01-01 PROCEDURE — 76937 US GUIDE VASCULAR ACCESS: CPT | Mod: 26

## 2025-01-01 PROCEDURE — 85027 COMPLETE CBC AUTOMATED: CPT

## 2025-01-01 PROCEDURE — 94003 VENT MGMT INPAT SUBQ DAY: CPT

## 2025-01-01 PROCEDURE — 94799 UNLISTED PULMONARY SVC/PX: CPT

## 2025-01-01 PROCEDURE — P9037: CPT

## 2025-01-01 PROCEDURE — 82784 ASSAY IGA/IGD/IGG/IGM EACH: CPT

## 2025-01-01 PROCEDURE — 93975 VASCULAR STUDY: CPT | Mod: 26

## 2025-01-01 PROCEDURE — 97530 THERAPEUTIC ACTIVITIES: CPT

## 2025-01-01 PROCEDURE — 97112 NEUROMUSCULAR REEDUCATION: CPT

## 2025-01-01 PROCEDURE — 84295 ASSAY OF SERUM SODIUM: CPT

## 2025-01-01 PROCEDURE — 99233 SBSQ HOSP IP/OBS HIGH 50: CPT | Mod: 24

## 2025-01-01 PROCEDURE — 83010 ASSAY OF HAPTOGLOBIN QUANT: CPT

## 2025-01-01 PROCEDURE — 99232 SBSQ HOSP IP/OBS MODERATE 35: CPT | Mod: GC

## 2025-01-01 PROCEDURE — 87798 DETECT AGENT NOS DNA AMP: CPT

## 2025-01-01 PROCEDURE — 87305 ASPERGILLUS AG IA: CPT

## 2025-01-01 PROCEDURE — 87075 CULTR BACTERIA EXCEPT BLOOD: CPT

## 2025-01-01 PROCEDURE — 85025 COMPLETE CBC W/AUTO DIFF WBC: CPT

## 2025-01-01 PROCEDURE — 82150 ASSAY OF AMYLASE: CPT

## 2025-01-01 PROCEDURE — 87529 HSV DNA AMP PROBE: CPT

## 2025-01-01 PROCEDURE — 86985 SPLIT BLOOD OR PRODUCTS: CPT

## 2025-01-01 PROCEDURE — P9073: CPT

## 2025-01-01 PROCEDURE — 87040 BLOOD CULTURE FOR BACTERIA: CPT

## 2025-01-01 PROCEDURE — 93971 EXTREMITY STUDY: CPT

## 2025-01-01 PROCEDURE — 74174 CTA ABD&PLVS W/CONTRAST: CPT | Mod: MC

## 2025-01-01 PROCEDURE — 87507 IADNA-DNA/RNA PROBE TQ 12-25: CPT

## 2025-01-01 PROCEDURE — 84443 ASSAY THYROID STIM HORMONE: CPT

## 2025-01-01 PROCEDURE — C8929: CPT

## 2025-01-01 PROCEDURE — 97606 NEG PRS WND THER DME>50 SQCM: CPT

## 2025-01-01 PROCEDURE — 36430 TRANSFUSION BLD/BLD COMPNT: CPT

## 2025-01-01 PROCEDURE — 84484 ASSAY OF TROPONIN QUANT: CPT

## 2025-01-01 PROCEDURE — 82947 ASSAY GLUCOSE BLOOD QUANT: CPT

## 2025-01-01 PROCEDURE — 85610 PROTHROMBIN TIME: CPT

## 2025-01-01 PROCEDURE — 93308 TTE F-UP OR LMTD: CPT

## 2025-01-01 PROCEDURE — 87205 SMEAR GRAM STAIN: CPT

## 2025-01-01 PROCEDURE — 97110 THERAPEUTIC EXERCISES: CPT

## 2025-01-01 PROCEDURE — 84145 PROCALCITONIN (PCT): CPT

## 2025-01-01 PROCEDURE — 87594 PNEUMCYSTS JIROVECII AMP PRB: CPT

## 2025-01-01 PROCEDURE — 80158 DRUG ASSAY CYCLOSPORINE: CPT

## 2025-01-01 PROCEDURE — 74176 CT ABD & PELVIS W/O CONTRAST: CPT | Mod: MC

## 2025-01-01 PROCEDURE — 87522 HEPATITIS C REVRS TRNSCRPJ: CPT

## 2025-01-01 PROCEDURE — 83735 ASSAY OF MAGNESIUM: CPT

## 2025-01-01 PROCEDURE — 54450 PREPUTIAL STRETCHING: CPT | Mod: 59

## 2025-01-01 PROCEDURE — 85018 HEMOGLOBIN: CPT

## 2025-01-01 PROCEDURE — 94640 AIRWAY INHALATION TREATMENT: CPT

## 2025-01-01 PROCEDURE — 85611 PROTHROMBIN TEST: CPT

## 2025-01-01 PROCEDURE — 84466 ASSAY OF TRANSFERRIN: CPT

## 2025-01-01 PROCEDURE — 93321 DOPPLER ECHO F-UP/LMTD STD: CPT

## 2025-01-01 PROCEDURE — 70450 CT HEAD/BRAIN W/O DYE: CPT | Mod: 26

## 2025-01-01 PROCEDURE — 83605 ASSAY OF LACTIC ACID: CPT

## 2025-01-01 PROCEDURE — 82962 GLUCOSE BLOOD TEST: CPT

## 2025-01-01 PROCEDURE — 87086 URINE CULTURE/COLONY COUNT: CPT

## 2025-01-01 PROCEDURE — 80162 ASSAY OF DIGOXIN TOTAL: CPT

## 2025-01-01 PROCEDURE — 86965 POOLING BLOOD PLATELETS: CPT

## 2025-01-01 PROCEDURE — 86140 C-REACTIVE PROTEIN: CPT

## 2025-01-01 PROCEDURE — 70450 CT HEAD/BRAIN W/O DYE: CPT | Mod: MC

## 2025-01-01 PROCEDURE — P9100: CPT

## 2025-01-01 PROCEDURE — 87077 CULTURE AEROBIC IDENTIFY: CPT

## 2025-01-01 PROCEDURE — 74177 CT ABD & PELVIS W/CONTRAST: CPT | Mod: 26

## 2025-01-01 PROCEDURE — 72192 CT PELVIS W/O DYE: CPT | Mod: MC

## 2025-01-01 PROCEDURE — 88307 TISSUE EXAM BY PATHOLOGIST: CPT

## 2025-01-01 PROCEDURE — 82550 ASSAY OF CK (CPK): CPT

## 2025-01-01 PROCEDURE — 82140 ASSAY OF AMMONIA: CPT

## 2025-01-01 PROCEDURE — 85049 AUTOMATED PLATELET COUNT: CPT

## 2025-01-01 PROCEDURE — 84132 ASSAY OF SERUM POTASSIUM: CPT

## 2025-01-01 PROCEDURE — 76705 ECHO EXAM OF ABDOMEN: CPT

## 2025-01-01 PROCEDURE — 85520 HEPARIN ASSAY: CPT

## 2025-01-01 PROCEDURE — 82435 ASSAY OF BLOOD CHLORIDE: CPT

## 2025-01-01 PROCEDURE — 82803 BLOOD GASES ANY COMBINATION: CPT

## 2025-01-01 PROCEDURE — 94002 VENT MGMT INPAT INIT DAY: CPT

## 2025-01-01 PROCEDURE — 51703 INSERT BLADDER CATH COMPLEX: CPT | Mod: 59

## 2025-01-01 PROCEDURE — 71250 CT THORAX DX C-: CPT | Mod: MC

## 2025-01-01 PROCEDURE — 93005 ELECTROCARDIOGRAM TRACING: CPT

## 2025-01-01 PROCEDURE — 87015 SPECIMEN INFECT AGNT CONCNTJ: CPT

## 2025-01-01 PROCEDURE — 74177 CT ABD & PELVIS W/CONTRAST: CPT | Mod: MC

## 2025-01-01 PROCEDURE — ZZZZZ: CPT

## 2025-01-01 PROCEDURE — 87102 FUNGUS ISOLATION CULTURE: CPT

## 2025-01-01 PROCEDURE — P9040: CPT

## 2025-01-01 PROCEDURE — 87186 SC STD MICRODIL/AGAR DIL: CPT

## 2025-01-01 PROCEDURE — 87184 SC STD DISK METHOD PER PLATE: CPT

## 2025-01-01 PROCEDURE — 97164 PT RE-EVAL EST PLAN CARE: CPT

## 2025-01-01 PROCEDURE — 97129 THER IVNTJ 1ST 15 MIN: CPT

## 2025-01-01 PROCEDURE — 85732 THROMBOPLASTIN TIME PARTIAL: CPT

## 2025-01-01 PROCEDURE — 87070 CULTURE OTHR SPECIMN AEROBIC: CPT

## 2025-01-01 PROCEDURE — 85045 AUTOMATED RETICULOCYTE COUNT: CPT

## 2025-01-01 PROCEDURE — 83615 LACTATE (LD) (LDH) ENZYME: CPT

## 2025-01-01 PROCEDURE — 36556 INSERT NON-TUNNEL CV CATH: CPT | Mod: 78,59,RT

## 2025-01-01 PROCEDURE — 86022 PLATELET ANTIBODIES: CPT

## 2025-01-01 PROCEDURE — 76870 US EXAM SCROTUM: CPT

## 2025-01-01 PROCEDURE — 36620 INSERTION CATHETER ARTERY: CPT

## 2025-01-01 PROCEDURE — 87910 NFCT AGT GNTYP ALYS CMV: CPT

## 2025-01-01 PROCEDURE — 82330 ASSAY OF CALCIUM: CPT

## 2025-01-01 PROCEDURE — 85384 FIBRINOGEN ACTIVITY: CPT

## 2025-01-01 PROCEDURE — 86901 BLOOD TYPING SEROLOGIC RH(D): CPT

## 2025-01-01 PROCEDURE — 71250 CT THORAX DX C-: CPT | Mod: 26

## 2025-01-01 PROCEDURE — 99221 1ST HOSP IP/OBS SF/LOW 40: CPT | Mod: 25

## 2025-01-01 PROCEDURE — 74018 RADEX ABDOMEN 1 VIEW: CPT

## 2025-01-01 PROCEDURE — 85396 CLOTTING ASSAY WHOLE BLOOD: CPT

## 2025-01-01 PROCEDURE — 84436 ASSAY OF TOTAL THYROXINE: CPT

## 2025-01-01 PROCEDURE — 81001 URINALYSIS AUTO W/SCOPE: CPT

## 2025-01-01 PROCEDURE — 71045 X-RAY EXAM CHEST 1 VIEW: CPT

## 2025-01-01 PROCEDURE — P9012: CPT

## 2025-01-01 PROCEDURE — 80053 COMPREHEN METABOLIC PANEL: CPT

## 2025-01-01 PROCEDURE — 99232 SBSQ HOSP IP/OBS MODERATE 35: CPT | Mod: 25

## 2025-01-01 PROCEDURE — P9045: CPT

## 2025-01-01 PROCEDURE — 82728 ASSAY OF FERRITIN: CPT

## 2025-01-01 PROCEDURE — 87637 SARSCOV2&INF A&B&RSV AMP PRB: CPT

## 2025-01-01 PROCEDURE — 71260 CT THORAX DX C+: CPT | Mod: MC

## 2025-01-01 PROCEDURE — 85670 THROMBIN TIME PLASMA: CPT

## 2025-01-01 PROCEDURE — 97605 NEG PRS WND THER DME<=50SQCM: CPT

## 2025-01-01 RX ORDER — MIDODRINE HYDROCHLORIDE 5 MG/1
30 TABLET ORAL EVERY 8 HOURS
Refills: 0 | Status: DISCONTINUED | OUTPATIENT
Start: 2025-01-01 | End: 2025-01-01

## 2025-01-01 RX ORDER — FILGRASTIM-SNDZ 300 UG/.5ML
480 INJECTION, SOLUTION INTRAVENOUS; SUBCUTANEOUS ONCE
Refills: 0 | Status: COMPLETED | OUTPATIENT
Start: 2025-01-01 | End: 2025-01-01

## 2025-01-01 RX ORDER — SULFAMETHOXAZOLE AND TRIMETHOPRIM 400; 80 MG/1; MG/1
300 TABLET ORAL
Refills: 0 | Status: DISCONTINUED | OUTPATIENT
Start: 2025-01-01 | End: 2025-01-01

## 2025-01-01 RX ORDER — ALBUMIN HUMAN 50 G/1000ML
50 SOLUTION INTRAVENOUS EVERY 6 HOURS
Refills: 0 | Status: DISCONTINUED | OUTPATIENT
Start: 2025-01-01 | End: 2025-01-01

## 2025-01-01 RX ORDER — ALTEPLASE 100 MG
2 KIT INTRAVENOUS ONCE
Refills: 0 | Status: COMPLETED | OUTPATIENT
Start: 2025-01-01 | End: 2025-01-01

## 2025-01-01 RX ORDER — DIPHENOXYLATE HYDROCHLORIDE AND ATROPINE SULFATE 2.5; .025 MG/1; MG/1
2 TABLET ORAL EVERY 6 HOURS
Refills: 0 | Status: DISCONTINUED | OUTPATIENT
Start: 2025-01-01 | End: 2025-01-01

## 2025-01-01 RX ORDER — OXYCODONE HYDROCHLORIDE 30 MG/1
2.5 TABLET ORAL EVERY 4 HOURS
Refills: 0 | Status: DISCONTINUED | OUTPATIENT
Start: 2025-01-01 | End: 2025-01-01

## 2025-01-01 RX ORDER — OXYCODONE HYDROCHLORIDE 30 MG/1
5 TABLET ORAL EVERY 4 HOURS
Refills: 0 | Status: DISCONTINUED | OUTPATIENT
Start: 2025-01-01 | End: 2025-01-01

## 2025-01-01 RX ORDER — ACETAMINOPHEN, DIPHENHYDRAMINE HCL, PHENYLEPHRINE HCL 325; 25; 5 MG/1; MG/1; MG/1
5 TABLET ORAL AT BEDTIME
Refills: 0 | Status: DISCONTINUED | OUTPATIENT
Start: 2025-01-01 | End: 2025-01-01

## 2025-01-01 RX ORDER — INSULIN NPH HUMAN ISOPHANE 100/ML (3)
6 INSULIN PEN (ML) SUBCUTANEOUS EVERY 6 HOURS
Refills: 0 | Status: DISCONTINUED | OUTPATIENT
Start: 2025-01-01 | End: 2025-01-01

## 2025-01-01 RX ORDER — ALBUMIN HUMAN 50 G/1000ML
250 SOLUTION INTRAVENOUS ONCE
Refills: 0 | Status: COMPLETED | OUTPATIENT
Start: 2025-01-01 | End: 2025-01-01

## 2025-01-01 RX ORDER — METOPROLOL SUCCINATE 25 MG
2.5 TABLET, EXTENDED RELEASE 24 HR ORAL EVERY 6 HOURS
Refills: 0 | Status: DISCONTINUED | OUTPATIENT
Start: 2025-01-01 | End: 2025-01-01

## 2025-01-01 RX ORDER — MEROPENEM 500 MG/20ML
1000 INJECTION INTRAVENOUS EVERY 12 HOURS
Refills: 0 | Status: DISCONTINUED | OUTPATIENT
Start: 2025-01-01 | End: 2025-01-01

## 2025-01-01 RX ORDER — GANCICLOVIR 250 MG/1
250 CAPSULE ORAL EVERY 24 HOURS
Refills: 0 | Status: DISCONTINUED | OUTPATIENT
Start: 2025-01-01 | End: 2025-01-01

## 2025-01-01 RX ORDER — FILGRASTIM-SNDZ 300 UG/.5ML
480 INJECTION, SOLUTION INTRAVENOUS; SUBCUTANEOUS EVERY 24 HOURS
Refills: 0 | Status: DISCONTINUED | OUTPATIENT
Start: 2025-01-01 | End: 2025-01-01

## 2025-01-01 RX ORDER — MIRTAZAPINE 30 MG/1
7.5 TABLET, FILM COATED ORAL AT BEDTIME
Refills: 0 | Status: DISCONTINUED | OUTPATIENT
Start: 2025-01-01 | End: 2025-01-01

## 2025-01-01 RX ORDER — GANCICLOVIR 250 MG/1
110 CAPSULE ORAL
Refills: 0 | Status: DISCONTINUED | OUTPATIENT
Start: 2025-01-01 | End: 2025-01-01

## 2025-01-01 RX ORDER — ALBUMIN HUMAN 50 G/1000ML
500 SOLUTION INTRAVENOUS EVERY 8 HOURS
Refills: 0 | Status: DISCONTINUED | OUTPATIENT
Start: 2025-01-01 | End: 2025-01-01

## 2025-01-01 RX ORDER — BUMETANIDE 2 MG/1
3 TABLET ORAL ONCE
Refills: 0 | Status: COMPLETED | OUTPATIENT
Start: 2025-01-01 | End: 2025-01-01

## 2025-01-01 RX ORDER — QUETIAPINE FUMARATE 300 MG/1
25 TABLET ORAL AT BEDTIME
Refills: 0 | Status: DISCONTINUED | OUTPATIENT
Start: 2025-01-01 | End: 2025-01-01

## 2025-01-01 RX ORDER — METOPROLOL SUCCINATE 25 MG
25 TABLET, EXTENDED RELEASE 24 HR ORAL EVERY 12 HOURS
Refills: 0 | Status: DISCONTINUED | OUTPATIENT
Start: 2025-01-01 | End: 2025-01-01

## 2025-01-01 RX ORDER — MEROPENEM 500 MG/20ML
1000 INJECTION INTRAVENOUS ONCE
Refills: 0 | Status: COMPLETED | OUTPATIENT
Start: 2025-01-01 | End: 2025-01-01

## 2025-01-01 RX ORDER — SULFAMETHOXAZOLE AND TRIMETHOPRIM 400; 80 MG/1; MG/1
390 TABLET ORAL EVERY 12 HOURS
Refills: 0 | Status: DISCONTINUED | OUTPATIENT
Start: 2025-01-01 | End: 2025-01-01

## 2025-01-01 RX ORDER — NOREPINEPHRINE BITARTRATE 1 MG/ML
0.05 INJECTION, SOLUTION, CONCENTRATE INTRAVENOUS
Qty: 8 | Refills: 0 | Status: DISCONTINUED | OUTPATIENT
Start: 2025-01-01 | End: 2025-01-01

## 2025-01-01 RX ORDER — CEFIDEROCOL SULFATE TOSYLATE 1 G/10ML
1500 INJECTION, POWDER, FOR SOLUTION INTRAVENOUS EVERY 8 HOURS
Refills: 0 | Status: DISCONTINUED | OUTPATIENT
Start: 2025-01-01 | End: 2025-01-01

## 2025-01-01 RX ORDER — PHENYLEPHRINE HYDROCHLORIDE 10 MG/ML
0.2 INJECTION INTRAVENOUS
Qty: 40 | Refills: 0 | Status: DISCONTINUED | OUTPATIENT
Start: 2025-01-01 | End: 2025-01-01

## 2025-01-01 RX ORDER — EPOETIN ALFA 2000 [IU]/ML
20000 SOLUTION INTRAVENOUS; SUBCUTANEOUS
Refills: 0 | Status: DISCONTINUED | OUTPATIENT
Start: 2025-01-01 | End: 2025-01-01

## 2025-01-01 RX ORDER — ANTISEPTIC SURGICAL SCRUB 0.04 MG/ML
15 SOLUTION TOPICAL EVERY 12 HOURS
Refills: 0 | Status: DISCONTINUED | OUTPATIENT
Start: 2025-01-01 | End: 2025-01-01

## 2025-01-01 RX ORDER — QUETIAPINE FUMARATE 300 MG/1
50 TABLET ORAL AT BEDTIME
Refills: 0 | Status: DISCONTINUED | OUTPATIENT
Start: 2025-01-01 | End: 2025-01-01

## 2025-01-01 RX ORDER — PHYTONADIONE 2 MG/ML
5 INJECTION, EMULSION INTRAMUSCULAR; INTRAVENOUS; SUBCUTANEOUS EVERY 24 HOURS
Refills: 0 | Status: COMPLETED | OUTPATIENT
Start: 2025-01-01 | End: 2025-01-01

## 2025-01-01 RX ORDER — MIRTAZAPINE 30 MG/1
7.5 TABLET, FILM COATED ORAL ONCE
Refills: 0 | Status: COMPLETED | OUTPATIENT
Start: 2025-01-01 | End: 2025-01-01

## 2025-01-01 RX ORDER — BUMETANIDE 2 MG/1
2 TABLET ORAL ONCE
Refills: 0 | Status: COMPLETED | OUTPATIENT
Start: 2025-01-01 | End: 2025-01-01

## 2025-01-01 RX ORDER — METOPROLOL SUCCINATE 25 MG
5 TABLET, EXTENDED RELEASE 24 HR ORAL ONCE
Refills: 0 | Status: COMPLETED | OUTPATIENT
Start: 2025-01-01 | End: 2025-01-01

## 2025-01-01 RX ORDER — CYCLOSPORINE 100 MG/1
75 CAPSULE, GELATIN COATED ORAL
Refills: 0 | Status: DISCONTINUED | OUTPATIENT
Start: 2025-01-01 | End: 2025-01-01

## 2025-01-01 RX ORDER — INSULIN NPH HUMAN ISOPHANE 100/ML (3)
4 INSULIN PEN (ML) SUBCUTANEOUS EVERY 6 HOURS
Refills: 0 | Status: DISCONTINUED | OUTPATIENT
Start: 2025-01-01 | End: 2025-01-01

## 2025-01-01 RX ORDER — SOD PHOSPHATE,MONOBASIC-DIBAS 3MMOL/ML
15 VIAL (ML) INTRAVENOUS ONCE
Refills: 0 | Status: COMPLETED | OUTPATIENT
Start: 2025-01-01 | End: 2025-01-01

## 2025-01-01 RX ORDER — INSULIN NPH HUMAN ISOPHANE 100/ML (3)
3 INSULIN PEN (ML) SUBCUTANEOUS ONCE
Refills: 0 | Status: COMPLETED | OUTPATIENT
Start: 2025-01-01 | End: 2025-01-01

## 2025-01-01 RX ORDER — SULFAMETHOXAZOLE AND TRIMETHOPRIM 400; 80 MG/1; MG/1
300 TABLET ORAL EVERY 8 HOURS
Refills: 0 | Status: DISCONTINUED | OUTPATIENT
Start: 2025-01-01 | End: 2025-01-01

## 2025-01-01 RX ORDER — CYCLOSPORINE 100 MG/1
25 CAPSULE, GELATIN COATED ORAL
Refills: 0 | Status: DISCONTINUED | OUTPATIENT
Start: 2025-01-01 | End: 2025-01-01

## 2025-01-01 RX ORDER — METOPROLOL SUCCINATE 25 MG
25 TABLET, EXTENDED RELEASE 24 HR ORAL EVERY 8 HOURS
Refills: 0 | Status: DISCONTINUED | OUTPATIENT
Start: 2025-01-01 | End: 2025-01-01

## 2025-01-01 RX ORDER — METOPROLOL SUCCINATE 25 MG
5 TABLET, EXTENDED RELEASE 24 HR ORAL EVERY 6 HOURS
Refills: 0 | Status: DISCONTINUED | OUTPATIENT
Start: 2025-01-01 | End: 2025-01-01

## 2025-01-01 RX ORDER — PHENYLEPHRINE HYDROCHLORIDE 10 MG/ML
4 INJECTION INTRAVENOUS
Qty: 160 | Refills: 0 | Status: DISCONTINUED | OUTPATIENT
Start: 2025-01-01 | End: 2025-01-01

## 2025-01-01 RX ORDER — DM/PSEUDOEPHED/ACETAMINOPHEN 10-30-250
25 CAPSULE ORAL ONCE
Refills: 0 | Status: COMPLETED | OUTPATIENT
Start: 2025-01-01 | End: 2025-01-01

## 2025-01-01 RX ORDER — MORPHINE 10 MG/ML
2 SOLUTION ORAL
Refills: 0 | Status: DISCONTINUED | OUTPATIENT
Start: 2025-01-01 | End: 2025-01-01

## 2025-01-01 RX ORDER — SULFAMETHOXAZOLE AND TRIMETHOPRIM 400; 80 MG/1; MG/1
150 TABLET ORAL
Refills: 0 | Status: DISCONTINUED | OUTPATIENT
Start: 2025-01-01 | End: 2025-01-01

## 2025-01-01 RX ORDER — FILGRASTIM-SNDZ 300 UG/.5ML
300 INJECTION, SOLUTION INTRAVENOUS; SUBCUTANEOUS ONCE
Refills: 0 | Status: COMPLETED | OUTPATIENT
Start: 2025-01-01 | End: 2025-01-01

## 2025-01-01 RX ORDER — ROMIPLOSTIM 250 UG/.5ML
200 INJECTION, POWDER, LYOPHILIZED, FOR SOLUTION SUBCUTANEOUS
Refills: 0 | Status: DISCONTINUED | OUTPATIENT
Start: 2025-01-01 | End: 2025-01-01

## 2025-01-01 RX ORDER — ALBUMIN HUMAN 50 G/1000ML
250 SOLUTION INTRAVENOUS ONCE
Refills: 0 | Status: DISCONTINUED | OUTPATIENT
Start: 2025-01-01 | End: 2025-01-01

## 2025-01-01 RX ORDER — BACTERIOSTATIC SODIUM CHLORIDE 0.9 %
4 VIAL (ML) INJECTION EVERY 12 HOURS
Refills: 0 | Status: DISCONTINUED | OUTPATIENT
Start: 2025-01-01 | End: 2025-01-01

## 2025-01-01 RX ORDER — VORICONAZOLE 10 MG/1
200 INJECTION, POWDER, LYOPHILIZED, FOR SOLUTION INTRAVENOUS EVERY 12 HOURS
Refills: 0 | Status: DISCONTINUED | OUTPATIENT
Start: 2025-01-01 | End: 2025-01-01

## 2025-01-01 RX ORDER — HYDROMORPHONE HYDROCHLORIDE 4 MG/ML
0.5 INJECTION, SOLUTION INTRAMUSCULAR; INTRAVENOUS; SUBCUTANEOUS ONCE
Refills: 0 | Status: DISCONTINUED | OUTPATIENT
Start: 2025-01-01 | End: 2025-01-01

## 2025-01-01 RX ORDER — POTASSIUM CHLORIDE 750 MG/1
10 TABLET, EXTENDED RELEASE ORAL
Refills: 0 | Status: DISCONTINUED | OUTPATIENT
Start: 2025-01-01 | End: 2025-01-01

## 2025-01-01 RX ORDER — GANCICLOVIR 250 MG/1
250 CAPSULE ORAL EVERY 12 HOURS
Refills: 0 | Status: DISCONTINUED | OUTPATIENT
Start: 2025-01-01 | End: 2025-01-01

## 2025-01-01 RX ORDER — POTASSIUM CHLORIDE 750 MG/1
20 TABLET, EXTENDED RELEASE ORAL
Refills: 0 | Status: DISCONTINUED | OUTPATIENT
Start: 2025-01-01 | End: 2025-01-01

## 2025-01-01 RX ORDER — MEROPENEM 500 MG/20ML
1000 INJECTION INTRAVENOUS EVERY 8 HOURS
Refills: 0 | Status: DISCONTINUED | OUTPATIENT
Start: 2025-01-01 | End: 2025-01-01

## 2025-01-01 RX ORDER — DIPHENOXYLATE HYDROCHLORIDE AND ATROPINE SULFATE 2.5; .025 MG/1; MG/1
1 TABLET ORAL
Refills: 0 | Status: DISCONTINUED | OUTPATIENT
Start: 2025-01-01 | End: 2025-01-01

## 2025-01-01 RX ORDER — TOBRAMYCIN 40 MG/ML
700 INJECTION INTRAMUSCULAR; INTRAVENOUS ONCE
Refills: 0 | Status: COMPLETED | OUTPATIENT
Start: 2025-01-01 | End: 2025-01-01

## 2025-01-01 RX ORDER — BACTERIOSTATIC SODIUM CHLORIDE 0.9 %
4 VIAL (ML) INJECTION EVERY 6 HOURS
Refills: 0 | Status: DISCONTINUED | OUTPATIENT
Start: 2025-01-01 | End: 2025-01-01

## 2025-01-01 RX ORDER — HYDROMORPHONE HYDROCHLORIDE 4 MG/ML
0.5 INJECTION, SOLUTION INTRAMUSCULAR; INTRAVENOUS; SUBCUTANEOUS
Refills: 0 | Status: DISCONTINUED | OUTPATIENT
Start: 2025-01-01 | End: 2025-01-01

## 2025-01-01 RX ORDER — INSULIN LISPRO 100/ML
VIAL (ML) SUBCUTANEOUS EVERY 4 HOURS
Refills: 0 | Status: DISCONTINUED | OUTPATIENT
Start: 2025-01-01 | End: 2025-01-01

## 2025-01-01 RX ORDER — INSULIN LISPRO 100/ML
VIAL (ML) SUBCUTANEOUS EVERY 6 HOURS
Refills: 0 | Status: DISCONTINUED | OUTPATIENT
Start: 2025-01-01 | End: 2025-01-01

## 2025-01-01 RX ORDER — ALBUMIN HUMAN 50 G/1000ML
100 SOLUTION INTRAVENOUS EVERY 6 HOURS
Refills: 0 | Status: COMPLETED | OUTPATIENT
Start: 2025-01-01 | End: 2025-01-01

## 2025-01-01 RX ORDER — METHYLPREDNISOLONE ACETATE 40 MG/ML
16 VIAL (ML) INJECTION DAILY
Refills: 0 | Status: DISCONTINUED | OUTPATIENT
Start: 2025-01-01 | End: 2025-01-01

## 2025-01-01 RX ORDER — MORPHINE 10 MG/ML
6 SOLUTION ORAL
Refills: 0 | Status: DISCONTINUED | OUTPATIENT
Start: 2025-01-01 | End: 2025-01-01

## 2025-01-01 RX ORDER — SULFAMETHOXAZOLE AND TRIMETHOPRIM 400; 80 MG/1; MG/1
80 TABLET ORAL DAILY
Refills: 0 | Status: DISCONTINUED | OUTPATIENT
Start: 2025-01-01 | End: 2025-01-01

## 2025-01-01 RX ORDER — SULFAMETHOXAZOLE AND TRIMETHOPRIM 400; 80 MG/1; MG/1
160 TABLET ORAL DAILY
Refills: 0 | Status: DISCONTINUED | OUTPATIENT
Start: 2025-01-01 | End: 2025-01-01

## 2025-01-01 RX ORDER — POTASSIUM CHLORIDE 750 MG/1
10 TABLET, EXTENDED RELEASE ORAL
Refills: 0 | Status: COMPLETED | OUTPATIENT
Start: 2025-01-01 | End: 2025-01-01

## 2025-01-01 RX ORDER — CEFIDEROCOL SULFATE TOSYLATE 1 G/10ML
750 INJECTION, POWDER, FOR SOLUTION INTRAVENOUS EVERY 12 HOURS
Refills: 0 | Status: DISCONTINUED | OUTPATIENT
Start: 2025-01-01 | End: 2025-01-01

## 2025-01-01 RX ORDER — METHYLPREDNISOLONE ACETATE 40 MG/ML
8 VIAL (ML) INJECTION DAILY
Refills: 0 | Status: DISCONTINUED | OUTPATIENT
Start: 2025-01-01 | End: 2025-01-01

## 2025-01-01 RX ORDER — MIDODRINE HYDROCHLORIDE 5 MG/1
20 TABLET ORAL EVERY 8 HOURS
Refills: 0 | Status: DISCONTINUED | OUTPATIENT
Start: 2025-01-01 | End: 2025-01-01

## 2025-01-01 RX ORDER — CYCLOSPORINE 100 MG/1
50 CAPSULE, GELATIN COATED ORAL
Refills: 0 | Status: DISCONTINUED | OUTPATIENT
Start: 2025-01-01 | End: 2025-01-01

## 2025-01-01 RX ORDER — DM/PSEUDOEPHED/ACETAMINOPHEN 10-30-250
50 CAPSULE ORAL ONCE
Refills: 0 | Status: COMPLETED | OUTPATIENT
Start: 2025-01-01 | End: 2025-01-01

## 2025-01-01 RX ORDER — INSULIN NPH HUMAN ISOPHANE 100/ML (3)
8 INSULIN PEN (ML) SUBCUTANEOUS EVERY 6 HOURS
Refills: 0 | Status: DISCONTINUED | OUTPATIENT
Start: 2025-01-01 | End: 2025-01-01

## 2025-01-01 RX ORDER — POTASSIUM CHLORIDE 750 MG/1
20 TABLET, EXTENDED RELEASE ORAL ONCE
Refills: 0 | Status: COMPLETED | OUTPATIENT
Start: 2025-01-01 | End: 2025-01-01

## 2025-01-01 RX ORDER — VORICONAZOLE 10 MG/1
150 INJECTION, POWDER, LYOPHILIZED, FOR SOLUTION INTRAVENOUS EVERY 12 HOURS
Refills: 0 | Status: DISCONTINUED | OUTPATIENT
Start: 2025-01-01 | End: 2025-01-01

## 2025-01-01 RX ORDER — METOPROLOL SUCCINATE 25 MG
12.5 TABLET, EXTENDED RELEASE 24 HR ORAL EVERY 12 HOURS
Refills: 0 | Status: DISCONTINUED | OUTPATIENT
Start: 2025-01-01 | End: 2025-01-01

## 2025-01-01 RX ORDER — POTASSIUM PHOSPHATE, MONOBASIC POTASSIUM PHOSPHATE, DIBASIC 236; 224 MG/ML; MG/ML
15 INJECTION, SOLUTION INTRAVENOUS ONCE
Refills: 0 | Status: COMPLETED | OUTPATIENT
Start: 2025-01-01 | End: 2025-01-01

## 2025-01-01 RX ORDER — IMMUNE GLOBULIN INFUSION (HUMAN) 100 MG/ML
10 INJECTION, SOLUTION INTRAVENOUS; SUBCUTANEOUS ONCE
Refills: 0 | Status: COMPLETED | OUTPATIENT
Start: 2025-01-01 | End: 2025-01-01

## 2025-01-01 RX ORDER — ACETAMINOPHEN, DIPHENHYDRAMINE HCL, PHENYLEPHRINE HCL 325; 25; 5 MG/1; MG/1; MG/1
5 TABLET ORAL ONCE
Refills: 0 | Status: COMPLETED | OUTPATIENT
Start: 2025-01-01 | End: 2025-01-01

## 2025-01-01 RX ORDER — CYCLOSPORINE 100 MG/1
25 CAPSULE, GELATIN COATED ORAL ONCE
Refills: 0 | Status: COMPLETED | OUTPATIENT
Start: 2025-01-01 | End: 2025-01-01

## 2025-01-01 RX ORDER — ALBUMIN HUMAN 50 G/1000ML
50 SOLUTION INTRAVENOUS EVERY 8 HOURS
Refills: 0 | Status: COMPLETED | OUTPATIENT
Start: 2025-01-01 | End: 2025-01-01

## 2025-01-01 RX ORDER — ROMIPLOSTIM 250 UG/.5ML
100 INJECTION, POWDER, LYOPHILIZED, FOR SOLUTION SUBCUTANEOUS
Refills: 0 | Status: DISCONTINUED | OUTPATIENT
Start: 2025-01-01 | End: 2025-01-01

## 2025-01-01 RX ORDER — URSODIOL 250 MG/1
300 TABLET, FILM COATED ORAL EVERY 12 HOURS
Refills: 0 | Status: DISCONTINUED | OUTPATIENT
Start: 2025-01-01 | End: 2025-01-01

## 2025-01-01 RX ORDER — MEROPENEM 500 MG/20ML
INJECTION INTRAVENOUS
Refills: 0 | Status: DISCONTINUED | OUTPATIENT
Start: 2025-01-01 | End: 2025-01-01

## 2025-01-01 RX ORDER — IMIPENEM AND CILASTATIN 250; 250 MG/100ML; MG/100ML
500 INJECTION, POWDER, FOR SOLUTION INTRAVENOUS EVERY 6 HOURS
Refills: 0 | Status: DISCONTINUED | OUTPATIENT
Start: 2025-01-01 | End: 2025-01-01

## 2025-01-01 RX ORDER — MAGNESIUM SULFATE 0.8 MEQ/ML
2 AMPUL (ML) INJECTION ONCE
Refills: 0 | Status: COMPLETED | OUTPATIENT
Start: 2025-01-01 | End: 2025-01-01

## 2025-01-01 RX ORDER — HYDROMORPHONE HYDROCHLORIDE 4 MG/ML
0.25 INJECTION, SOLUTION INTRAMUSCULAR; INTRAVENOUS; SUBCUTANEOUS
Refills: 0 | Status: DISCONTINUED | OUTPATIENT
Start: 2025-01-01 | End: 2025-01-01

## 2025-01-01 RX ORDER — ALBUMIN HUMAN 50 G/1000ML
500 SOLUTION INTRAVENOUS ONCE
Refills: 0 | Status: COMPLETED | OUTPATIENT
Start: 2025-01-01 | End: 2025-01-01

## 2025-01-01 RX ORDER — PHENYLEPHRINE HYDROCHLORIDE 10 MG/ML
0.1 INJECTION INTRAVENOUS
Qty: 40 | Refills: 0 | Status: DISCONTINUED | OUTPATIENT
Start: 2025-01-01 | End: 2025-01-01

## 2025-01-01 RX ORDER — ALBUMIN HUMAN 50 G/1000ML
250 SOLUTION INTRAVENOUS EVERY 6 HOURS
Refills: 0 | Status: COMPLETED | OUTPATIENT
Start: 2025-01-01 | End: 2025-01-01

## 2025-01-01 RX ORDER — LOPERAMIDE HYDROCHLORIDE 2 MG/1
4 CAPSULE ORAL EVERY 6 HOURS
Refills: 0 | Status: DISCONTINUED | OUTPATIENT
Start: 2025-01-01 | End: 2025-01-01

## 2025-01-01 RX ORDER — GANCICLOVIR 250 MG/1
250 CAPSULE ORAL
Refills: 0 | Status: DISCONTINUED | OUTPATIENT
Start: 2025-01-01 | End: 2025-01-01

## 2025-01-01 RX ORDER — LOPERAMIDE HYDROCHLORIDE 2 MG/1
2 CAPSULE ORAL EVERY 6 HOURS
Refills: 0 | Status: DISCONTINUED | OUTPATIENT
Start: 2025-01-01 | End: 2025-01-01

## 2025-01-01 RX ORDER — ALBUMIN HUMAN 50 G/1000ML
500 SOLUTION INTRAVENOUS EVERY 6 HOURS
Refills: 0 | Status: COMPLETED | OUTPATIENT
Start: 2025-01-01 | End: 2025-01-01

## 2025-01-01 RX ORDER — MINOCYCLINE HCL 100 MG
200 CAPSULE ORAL EVERY 12 HOURS
Refills: 0 | Status: DISCONTINUED | OUTPATIENT
Start: 2025-01-01 | End: 2025-01-01

## 2025-01-01 RX ORDER — ALBUMIN HUMAN 50 G/1000ML
50 SOLUTION INTRAVENOUS EVERY 6 HOURS
Refills: 0 | Status: COMPLETED | OUTPATIENT
Start: 2025-01-01 | End: 2025-01-01

## 2025-01-01 RX ORDER — GANCICLOVIR 250 MG/1
125 CAPSULE ORAL
Refills: 0 | Status: DISCONTINUED | OUTPATIENT
Start: 2025-01-01 | End: 2025-01-01

## 2025-01-01 RX ORDER — NOREPINEPHRINE BITARTRATE 1 MG/ML
0.05 INJECTION, SOLUTION, CONCENTRATE INTRAVENOUS
Qty: 16 | Refills: 0 | Status: DISCONTINUED | OUTPATIENT
Start: 2025-01-01 | End: 2025-01-01

## 2025-01-01 RX ORDER — PHYTONADIONE 2 MG/ML
10 INJECTION, EMULSION INTRAMUSCULAR; INTRAVENOUS; SUBCUTANEOUS ONCE
Refills: 0 | Status: COMPLETED | OUTPATIENT
Start: 2025-01-01 | End: 2025-01-01

## 2025-01-01 RX ADMIN — URSODIOL 300 MILLIGRAM(S): 250 TABLET, FILM COATED ORAL at 05:49

## 2025-01-01 RX ADMIN — Medication 100 MILLIGRAM(S): at 05:49

## 2025-01-01 RX ADMIN — CYCLOSPORINE 50 MILLIGRAM(S): 100 CAPSULE, GELATIN COATED ORAL at 20:10

## 2025-01-01 RX ADMIN — GANCICLOVIR 100 MILLIGRAM(S): 250 CAPSULE ORAL at 18:03

## 2025-01-01 RX ADMIN — MIDODRINE HYDROCHLORIDE 30 MILLIGRAM(S): 5 TABLET ORAL at 13:58

## 2025-01-01 RX ADMIN — Medication 8 UNIT(S): at 11:54

## 2025-01-01 RX ADMIN — PHENYLEPHRINE HYDROCHLORIDE 7.46 MICROGRAM(S)/KG/MIN: 10 INJECTION INTRAVENOUS at 19:54

## 2025-01-01 RX ADMIN — IMIPENEM AND CILASTATIN 100 MILLIGRAM(S): 250; 250 INJECTION, POWDER, FOR SOLUTION INTRAVENOUS at 05:47

## 2025-01-01 RX ADMIN — Medication 100 MILLIGRAM(S): at 06:26

## 2025-01-01 RX ADMIN — HYDROMORPHONE HYDROCHLORIDE 0.25 MILLIGRAM(S): 4 INJECTION, SOLUTION INTRAMUSCULAR; INTRAVENOUS; SUBCUTANEOUS at 19:00

## 2025-01-01 RX ADMIN — Medication 2: at 17:16

## 2025-01-01 RX ADMIN — IPRATROPIUM BROMIDE AND ALBUTEROL SULFATE 3 MILLILITER(S): .5; 2.5 SOLUTION RESPIRATORY (INHALATION) at 05:22

## 2025-01-01 RX ADMIN — HYDROMORPHONE HYDROCHLORIDE 0.25 MILLIGRAM(S): 4 INJECTION, SOLUTION INTRAMUSCULAR; INTRAVENOUS; SUBCUTANEOUS at 04:56

## 2025-01-01 RX ADMIN — Medication 100 MILLIGRAM(S): at 06:30

## 2025-01-01 RX ADMIN — CASPOFUNGIN ACETATE 250 MILLIGRAM(S): 5 INJECTION, POWDER, LYOPHILIZED, FOR SOLUTION INTRAVENOUS at 16:56

## 2025-01-01 RX ADMIN — NYSTATIN 1 APPLICATION(S): 100000 POWDER TOPICAL at 17:12

## 2025-01-01 RX ADMIN — NYSTATIN 1 APPLICATION(S): 100000 POWDER TOPICAL at 05:36

## 2025-01-01 RX ADMIN — ACETAMINOPHEN, DIPHENHYDRAMINE HCL, PHENYLEPHRINE HCL 5 MILLIGRAM(S): 325; 25; 5 TABLET ORAL at 21:58

## 2025-01-01 RX ADMIN — Medication 6 UNIT(S): at 23:47

## 2025-01-01 RX ADMIN — GANCICLOVIR 100 MILLIGRAM(S): 250 CAPSULE ORAL at 08:11

## 2025-01-01 RX ADMIN — POTASSIUM CHLORIDE 100 MILLIEQUIVALENT(S): 750 TABLET, EXTENDED RELEASE ORAL at 08:33

## 2025-01-01 RX ADMIN — CYCLOSPORINE 50 MILLIGRAM(S): 100 CAPSULE, GELATIN COATED ORAL at 20:21

## 2025-01-01 RX ADMIN — VORICONAZOLE 125 MILLIGRAM(S): 10 INJECTION, POWDER, LYOPHILIZED, FOR SOLUTION INTRAVENOUS at 18:03

## 2025-01-01 RX ADMIN — BUDESONIDE 0.5 MILLIGRAM(S): 9 TABLET, EXTENDED RELEASE ORAL at 17:12

## 2025-01-01 RX ADMIN — VORICONAZOLE 125 MILLIGRAM(S): 10 INJECTION, POWDER, LYOPHILIZED, FOR SOLUTION INTRAVENOUS at 06:50

## 2025-01-01 RX ADMIN — MIDODRINE HYDROCHLORIDE 20 MILLIGRAM(S): 5 TABLET ORAL at 13:42

## 2025-01-01 RX ADMIN — Medication 5 MILLIGRAM(S): at 06:05

## 2025-01-01 RX ADMIN — QUETIAPINE FUMARATE 25 MILLIGRAM(S): 300 TABLET ORAL at 21:06

## 2025-01-01 RX ADMIN — PHYTONADIONE 5 MILLIGRAM(S): 2 INJECTION, EMULSION INTRAMUSCULAR; INTRAVENOUS; SUBCUTANEOUS at 11:56

## 2025-01-01 RX ADMIN — IMMUNE GLOBULIN INFUSION (HUMAN) 16.67 GRAM(S): 100 INJECTION, SOLUTION INTRAVENOUS; SUBCUTANEOUS at 13:12

## 2025-01-01 RX ADMIN — Medication 16 MILLIGRAM(S): at 06:35

## 2025-01-01 RX ADMIN — MUPIROCIN 1 APPLICATION(S): 2 CREAM TOPICAL at 17:05

## 2025-01-01 RX ADMIN — Medication 1 APPLICATION(S): at 11:06

## 2025-01-01 RX ADMIN — Medication 1 APPLICATION(S): at 11:48

## 2025-01-01 RX ADMIN — ANTISEPTIC SURGICAL SCRUB 15 MILLILITER(S): 0.04 SOLUTION TOPICAL at 17:13

## 2025-01-01 RX ADMIN — DIPHENOXYLATE HYDROCHLORIDE AND ATROPINE SULFATE 2 TABLET(S): 2.5; .025 TABLET ORAL at 05:12

## 2025-01-01 RX ADMIN — Medication 4 MILLILITER(S): at 05:03

## 2025-01-01 RX ADMIN — ANTISEPTIC SURGICAL SCRUB 15 MILLILITER(S): 0.04 SOLUTION TOPICAL at 06:31

## 2025-01-01 RX ADMIN — PHENYLEPHRINE HYDROCHLORIDE 7.46 MICROGRAM(S)/KG/MIN: 10 INJECTION INTRAVENOUS at 19:30

## 2025-01-01 RX ADMIN — Medication 4.5 UNIT(S)/MIN: at 19:39

## 2025-01-01 RX ADMIN — Medication 6 UNIT(S): at 18:39

## 2025-01-01 RX ADMIN — HYDROMORPHONE HYDROCHLORIDE 0.25 MILLIGRAM(S): 4 INJECTION, SOLUTION INTRAMUSCULAR; INTRAVENOUS; SUBCUTANEOUS at 03:48

## 2025-01-01 RX ADMIN — MORPHINE 2 MILLIGRAM(S): 10 SOLUTION ORAL at 12:45

## 2025-01-01 RX ADMIN — MIDODRINE HYDROCHLORIDE 20 MILLIGRAM(S): 5 TABLET ORAL at 13:13

## 2025-01-01 RX ADMIN — ANTISEPTIC SURGICAL SCRUB 15 MILLILITER(S): 0.04 SOLUTION TOPICAL at 18:01

## 2025-01-01 RX ADMIN — Medication 2.5 MILLIGRAM(S): at 12:09

## 2025-01-01 RX ADMIN — CYCLOSPORINE 75 MILLIGRAM(S): 100 CAPSULE, GELATIN COATED ORAL at 08:04

## 2025-01-01 RX ADMIN — Medication 32 MILLIGRAM(S): at 13:40

## 2025-01-01 RX ADMIN — PANTOPRAZOLE 40 MILLIGRAM(S): 20 TABLET, DELAYED RELEASE ORAL at 05:00

## 2025-01-01 RX ADMIN — SULFAMETHOXAZOLE AND TRIMETHOPRIM 268.75 MILLIGRAM(S): 400; 80 TABLET ORAL at 23:22

## 2025-01-01 RX ADMIN — PHENYLEPHRINE HYDROCHLORIDE 74.6 MICROGRAM(S)/KG/MIN: 10 INJECTION INTRAVENOUS at 09:36

## 2025-01-01 RX ADMIN — MIDODRINE HYDROCHLORIDE 20 MILLIGRAM(S): 5 TABLET ORAL at 06:28

## 2025-01-01 RX ADMIN — ALBUMIN HUMAN 50 MILLILITER(S): 50 SOLUTION INTRAVENOUS at 03:48

## 2025-01-01 RX ADMIN — OXYCODONE HYDROCHLORIDE 2.5 MILLIGRAM(S): 30 TABLET ORAL at 04:24

## 2025-01-01 RX ADMIN — OXYCODONE HYDROCHLORIDE 2.5 MILLIGRAM(S): 30 TABLET ORAL at 13:27

## 2025-01-01 RX ADMIN — MEROPENEM 100 MILLIGRAM(S): 500 INJECTION INTRAVENOUS at 14:10

## 2025-01-01 RX ADMIN — Medication 1 APPLICATION(S): at 11:51

## 2025-01-01 RX ADMIN — DIPHENOXYLATE HYDROCHLORIDE AND ATROPINE SULFATE 2 TABLET(S): 2.5; .025 TABLET ORAL at 11:07

## 2025-01-01 RX ADMIN — Medication 4.5 UNIT(S)/MIN: at 19:51

## 2025-01-01 RX ADMIN — PANTOPRAZOLE 40 MILLIGRAM(S): 20 TABLET, DELAYED RELEASE ORAL at 18:23

## 2025-01-01 RX ADMIN — ALBUMIN HUMAN 50 MILLILITER(S): 50 SOLUTION INTRAVENOUS at 13:20

## 2025-01-01 RX ADMIN — Medication 5 MILLIGRAM(S): at 12:32

## 2025-01-01 RX ADMIN — DIPHENOXYLATE HYDROCHLORIDE AND ATROPINE SULFATE 2 TABLET(S): 2.5; .025 TABLET ORAL at 05:16

## 2025-01-01 RX ADMIN — Medication 4.5 UNIT(S)/MIN: at 08:11

## 2025-01-01 RX ADMIN — Medication 2.5 MILLIGRAM(S): at 05:18

## 2025-01-01 RX ADMIN — PANTOPRAZOLE 40 MILLIGRAM(S): 20 TABLET, DELAYED RELEASE ORAL at 05:24

## 2025-01-01 RX ADMIN — Medication 5 MILLIGRAM(S): at 17:37

## 2025-01-01 RX ADMIN — IPRATROPIUM BROMIDE AND ALBUTEROL SULFATE 3 MILLILITER(S): .5; 2.5 SOLUTION RESPIRATORY (INHALATION) at 23:18

## 2025-01-01 RX ADMIN — URSODIOL 300 MILLIGRAM(S): 250 TABLET, FILM COATED ORAL at 17:39

## 2025-01-01 RX ADMIN — BUDESONIDE 0.5 MILLIGRAM(S): 9 TABLET, EXTENDED RELEASE ORAL at 05:10

## 2025-01-01 RX ADMIN — PANTOPRAZOLE 40 MILLIGRAM(S): 20 TABLET, DELAYED RELEASE ORAL at 05:32

## 2025-01-01 RX ADMIN — POTASSIUM CHLORIDE 100 MILLIEQUIVALENT(S): 750 TABLET, EXTENDED RELEASE ORAL at 09:45

## 2025-01-01 RX ADMIN — NYSTATIN 1 APPLICATION(S): 100000 POWDER TOPICAL at 05:34

## 2025-01-01 RX ADMIN — MUPIROCIN 1 APPLICATION(S): 2 CREAM TOPICAL at 17:07

## 2025-01-01 RX ADMIN — Medication 4.5 UNIT(S)/MIN: at 19:59

## 2025-01-01 RX ADMIN — Medication 5 MILLIGRAM(S): at 17:09

## 2025-01-01 RX ADMIN — IMIPENEM AND CILASTATIN 100 MILLIGRAM(S): 250; 250 INJECTION, POWDER, FOR SOLUTION INTRAVENOUS at 17:19

## 2025-01-01 RX ADMIN — SULFAMETHOXAZOLE AND TRIMETHOPRIM 266.25 MILLIGRAM(S): 400; 80 TABLET ORAL at 09:17

## 2025-01-01 RX ADMIN — DIPHENOXYLATE HYDROCHLORIDE AND ATROPINE SULFATE 2 TABLET(S): 2.5; .025 TABLET ORAL at 23:20

## 2025-01-01 RX ADMIN — NYSTATIN 1 APPLICATION(S): 100000 POWDER TOPICAL at 05:14

## 2025-01-01 RX ADMIN — ANTISEPTIC SURGICAL SCRUB 15 MILLILITER(S): 0.04 SOLUTION TOPICAL at 17:28

## 2025-01-01 RX ADMIN — Medication 2.5 MILLIGRAM(S): at 06:33

## 2025-01-01 RX ADMIN — HYDROMORPHONE HYDROCHLORIDE 0.25 MILLIGRAM(S): 4 INJECTION, SOLUTION INTRAMUSCULAR; INTRAVENOUS; SUBCUTANEOUS at 12:39

## 2025-01-01 RX ADMIN — ALBUMIN HUMAN 100 MILLILITER(S): 50 SOLUTION INTRAVENOUS at 00:07

## 2025-01-01 RX ADMIN — CASPOFUNGIN ACETATE 250 MILLIGRAM(S): 5 INJECTION, POWDER, LYOPHILIZED, FOR SOLUTION INTRAVENOUS at 13:41

## 2025-01-01 RX ADMIN — BUDESONIDE 0.5 MILLIGRAM(S): 9 TABLET, EXTENDED RELEASE ORAL at 17:31

## 2025-01-01 RX ADMIN — MIDODRINE HYDROCHLORIDE 30 MILLIGRAM(S): 5 TABLET ORAL at 21:59

## 2025-01-01 RX ADMIN — Medication 32 MILLIGRAM(S): at 13:27

## 2025-01-01 RX ADMIN — Medication 1 APPLICATION(S): at 11:33

## 2025-01-01 RX ADMIN — Medication 5 MILLIGRAM(S): at 12:04

## 2025-01-01 RX ADMIN — SULFAMETHOXAZOLE AND TRIMETHOPRIM 80 MILLIGRAM(S): 400; 80 TABLET ORAL at 11:08

## 2025-01-01 RX ADMIN — Medication 100 MILLIGRAM(S): at 13:57

## 2025-01-01 RX ADMIN — Medication 4.5 UNIT(S)/MIN: at 19:56

## 2025-01-01 RX ADMIN — Medication 4.5 UNIT(S)/MIN: at 04:56

## 2025-01-01 RX ADMIN — LOPERAMIDE HYDROCHLORIDE 2 MILLIGRAM(S): 2 CAPSULE ORAL at 17:20

## 2025-01-01 RX ADMIN — BUDESONIDE 0.5 MILLIGRAM(S): 9 TABLET, EXTENDED RELEASE ORAL at 05:13

## 2025-01-01 RX ADMIN — PANTOPRAZOLE 40 MILLIGRAM(S): 20 TABLET, DELAYED RELEASE ORAL at 05:56

## 2025-01-01 RX ADMIN — Medication 32 MILLIGRAM(S): at 13:47

## 2025-01-01 RX ADMIN — Medication 8 UNIT(S): at 17:22

## 2025-01-01 RX ADMIN — ALBUMIN HUMAN 250 MILLILITER(S): 50 SOLUTION INTRAVENOUS at 05:47

## 2025-01-01 RX ADMIN — PANTOPRAZOLE 40 MILLIGRAM(S): 20 TABLET, DELAYED RELEASE ORAL at 05:04

## 2025-01-01 RX ADMIN — DIPHENHYDRAMINE HYDROCHLORIDE AND LIDOCAINE HYDROCHLORIDE AND ALUMINUM HYDROXIDE AND MAGNESIUM HYDRO 10 MILLILITER(S): KIT at 21:04

## 2025-01-01 RX ADMIN — Medication 4: at 23:15

## 2025-01-01 RX ADMIN — LOPERAMIDE HYDROCHLORIDE 2 MILLIGRAM(S): 2 CAPSULE ORAL at 18:03

## 2025-01-01 RX ADMIN — PHENYLEPHRINE HYDROCHLORIDE 7.46 MICROGRAM(S)/KG/MIN: 10 INJECTION INTRAVENOUS at 07:29

## 2025-01-01 RX ADMIN — Medication 100 MILLIGRAM(S): at 13:19

## 2025-01-01 RX ADMIN — MIDODRINE HYDROCHLORIDE 10 MILLIGRAM(S): 5 TABLET ORAL at 05:16

## 2025-01-01 RX ADMIN — MIDODRINE HYDROCHLORIDE 10 MILLIGRAM(S): 5 TABLET ORAL at 21:05

## 2025-01-01 RX ADMIN — NYSTATIN 1 APPLICATION(S): 100000 POWDER TOPICAL at 17:09

## 2025-01-01 RX ADMIN — Medication 4: at 09:36

## 2025-01-01 RX ADMIN — ALBUMIN HUMAN 100 MILLILITER(S): 50 SOLUTION INTRAVENOUS at 17:42

## 2025-01-01 RX ADMIN — Medication 200 GRAM(S): at 19:27

## 2025-01-01 RX ADMIN — Medication 1 APPLICATION(S): at 12:36

## 2025-01-01 RX ADMIN — PANTOPRAZOLE 40 MILLIGRAM(S): 20 TABLET, DELAYED RELEASE ORAL at 05:45

## 2025-01-01 RX ADMIN — MUPIROCIN 1 APPLICATION(S): 2 CREAM TOPICAL at 05:47

## 2025-01-01 RX ADMIN — GANCICLOVIR 100 MILLIGRAM(S): 250 CAPSULE ORAL at 05:23

## 2025-01-01 RX ADMIN — ALBUMIN HUMAN 250 MILLILITER(S): 50 SOLUTION INTRAVENOUS at 20:56

## 2025-01-01 RX ADMIN — HYDROMORPHONE HYDROCHLORIDE 0.25 MILLIGRAM(S): 4 INJECTION, SOLUTION INTRAMUSCULAR; INTRAVENOUS; SUBCUTANEOUS at 23:30

## 2025-01-01 RX ADMIN — Medication 100 MILLIGRAM(S): at 13:11

## 2025-01-01 RX ADMIN — MIDODRINE HYDROCHLORIDE 10 MILLIGRAM(S): 5 TABLET ORAL at 05:48

## 2025-01-01 RX ADMIN — Medication 32 MILLIGRAM(S): at 11:23

## 2025-01-01 RX ADMIN — IMIPENEM AND CILASTATIN 100 MILLIGRAM(S): 250; 250 INJECTION, POWDER, FOR SOLUTION INTRAVENOUS at 06:27

## 2025-01-01 RX ADMIN — PHENYLEPHRINE HYDROCHLORIDE 74.6 MICROGRAM(S)/KG/MIN: 10 INJECTION INTRAVENOUS at 08:16

## 2025-01-01 RX ADMIN — Medication 2.5 MILLIGRAM(S): at 05:39

## 2025-01-01 RX ADMIN — Medication 2: at 00:06

## 2025-01-01 RX ADMIN — NYSTATIN 1 APPLICATION(S): 100000 POWDER TOPICAL at 17:54

## 2025-01-01 RX ADMIN — Medication 4.5 UNIT(S)/MIN: at 19:44

## 2025-01-01 RX ADMIN — MUPIROCIN 1 APPLICATION(S): 2 CREAM TOPICAL at 17:17

## 2025-01-01 RX ADMIN — Medication 4: at 23:46

## 2025-01-01 RX ADMIN — IMIPENEM AND CILASTATIN 100 MILLIGRAM(S): 250; 250 INJECTION, POWDER, FOR SOLUTION INTRAVENOUS at 16:09

## 2025-01-01 RX ADMIN — PHENYLEPHRINE HYDROCHLORIDE 3.73 MICROGRAM(S)/KG/MIN: 10 INJECTION INTRAVENOUS at 19:25

## 2025-01-01 RX ADMIN — MIDODRINE HYDROCHLORIDE 30 MILLIGRAM(S): 5 TABLET ORAL at 14:31

## 2025-01-01 RX ADMIN — BUDESONIDE 0.5 MILLIGRAM(S): 9 TABLET, EXTENDED RELEASE ORAL at 05:16

## 2025-01-01 RX ADMIN — HYDROMORPHONE HYDROCHLORIDE 0.5 MILLIGRAM(S): 4 INJECTION, SOLUTION INTRAMUSCULAR; INTRAVENOUS; SUBCUTANEOUS at 06:29

## 2025-01-01 RX ADMIN — Medication 1 APPLICATION(S): at 12:03

## 2025-01-01 RX ADMIN — ANTISEPTIC SURGICAL SCRUB 1 APPLICATION(S): 0.04 SOLUTION TOPICAL at 05:36

## 2025-01-01 RX ADMIN — Medication 50 MILLILITER(S): at 18:23

## 2025-01-01 RX ADMIN — IPRATROPIUM BROMIDE AND ALBUTEROL SULFATE 3 MILLILITER(S): .5; 2.5 SOLUTION RESPIRATORY (INHALATION) at 05:45

## 2025-01-01 RX ADMIN — NYSTATIN 1 APPLICATION(S): 100000 POWDER TOPICAL at 18:36

## 2025-01-01 RX ADMIN — SULFAMETHOXAZOLE AND TRIMETHOPRIM 268.75 MILLIGRAM(S): 400; 80 TABLET ORAL at 06:26

## 2025-01-01 RX ADMIN — Medication 2.5 MILLIGRAM(S): at 17:07

## 2025-01-01 RX ADMIN — Medication 5 MILLIGRAM(S): at 23:28

## 2025-01-01 RX ADMIN — HYDROMORPHONE HYDROCHLORIDE 0.25 MILLIGRAM(S): 4 INJECTION, SOLUTION INTRAMUSCULAR; INTRAVENOUS; SUBCUTANEOUS at 21:00

## 2025-01-01 RX ADMIN — ANTISEPTIC SURGICAL SCRUB 15 MILLILITER(S): 0.04 SOLUTION TOPICAL at 05:07

## 2025-01-01 RX ADMIN — Medication 2: at 06:25

## 2025-01-01 RX ADMIN — LOPERAMIDE HYDROCHLORIDE 2 MILLIGRAM(S): 2 CAPSULE ORAL at 17:11

## 2025-01-01 RX ADMIN — Medication 4 MILLILITER(S): at 05:06

## 2025-01-01 RX ADMIN — Medication 100 MILLIGRAM(S): at 05:46

## 2025-01-01 RX ADMIN — CEFIDEROCOL SULFATE TOSYLATE 33.33 MILLIGRAM(S): 1 INJECTION, POWDER, FOR SOLUTION INTRAVENOUS at 12:29

## 2025-01-01 RX ADMIN — PHENYLEPHRINE HYDROCHLORIDE 74.6 MICROGRAM(S)/KG/MIN: 10 INJECTION INTRAVENOUS at 19:58

## 2025-01-01 RX ADMIN — Medication 4.5 UNIT(S)/MIN: at 05:06

## 2025-01-01 RX ADMIN — IMIPENEM AND CILASTATIN 100 MILLIGRAM(S): 250; 250 INJECTION, POWDER, FOR SOLUTION INTRAVENOUS at 07:43

## 2025-01-01 RX ADMIN — Medication 6: at 11:56

## 2025-01-01 RX ADMIN — Medication 6 UNIT(S): at 18:07

## 2025-01-01 RX ADMIN — HYDROMORPHONE HYDROCHLORIDE 0.25 MILLIGRAM(S): 4 INJECTION, SOLUTION INTRAMUSCULAR; INTRAVENOUS; SUBCUTANEOUS at 14:30

## 2025-01-01 RX ADMIN — VORICONAZOLE 125 MILLIGRAM(S): 10 INJECTION, POWDER, LYOPHILIZED, FOR SOLUTION INTRAVENOUS at 09:14

## 2025-01-01 RX ADMIN — Medication 4.5 UNIT(S)/MIN: at 05:46

## 2025-01-01 RX ADMIN — Medication 4 MILLILITER(S): at 23:18

## 2025-01-01 RX ADMIN — PANTOPRAZOLE 40 MILLIGRAM(S): 20 TABLET, DELAYED RELEASE ORAL at 05:02

## 2025-01-01 RX ADMIN — MIDODRINE HYDROCHLORIDE 20 MILLIGRAM(S): 5 TABLET ORAL at 21:10

## 2025-01-01 RX ADMIN — Medication 2: at 05:13

## 2025-01-01 RX ADMIN — ALBUMIN HUMAN 50 MILLILITER(S): 50 SOLUTION INTRAVENOUS at 17:03

## 2025-01-01 RX ADMIN — MIDODRINE HYDROCHLORIDE 10 MILLIGRAM(S): 5 TABLET ORAL at 05:47

## 2025-01-01 RX ADMIN — HYDROMORPHONE HYDROCHLORIDE 0.25 MILLIGRAM(S): 4 INJECTION, SOLUTION INTRAMUSCULAR; INTRAVENOUS; SUBCUTANEOUS at 04:41

## 2025-01-01 RX ADMIN — MEROPENEM 100 MILLIGRAM(S): 500 INJECTION INTRAVENOUS at 05:48

## 2025-01-01 RX ADMIN — CYCLOSPORINE 50 MILLIGRAM(S): 100 CAPSULE, GELATIN COATED ORAL at 21:30

## 2025-01-01 RX ADMIN — LOPERAMIDE HYDROCHLORIDE 2 MILLIGRAM(S): 2 CAPSULE ORAL at 23:20

## 2025-01-01 RX ADMIN — PANTOPRAZOLE 40 MILLIGRAM(S): 20 TABLET, DELAYED RELEASE ORAL at 05:48

## 2025-01-01 RX ADMIN — ANTISEPTIC SURGICAL SCRUB 15 MILLILITER(S): 0.04 SOLUTION TOPICAL at 05:01

## 2025-01-01 RX ADMIN — LOPERAMIDE HYDROCHLORIDE 2 MILLIGRAM(S): 2 CAPSULE ORAL at 11:14

## 2025-01-01 RX ADMIN — Medication 1 APPLICATION(S): at 15:36

## 2025-01-01 RX ADMIN — MIDODRINE HYDROCHLORIDE 10 MILLIGRAM(S): 5 TABLET ORAL at 13:14

## 2025-01-01 RX ADMIN — ALBUMIN HUMAN 100 MILLILITER(S): 50 SOLUTION INTRAVENOUS at 12:46

## 2025-01-01 RX ADMIN — LOPERAMIDE HYDROCHLORIDE 2 MILLIGRAM(S): 2 CAPSULE ORAL at 13:20

## 2025-01-01 RX ADMIN — MIDODRINE HYDROCHLORIDE 30 MILLIGRAM(S): 5 TABLET ORAL at 05:08

## 2025-01-01 RX ADMIN — Medication 4.5 UNIT(S)/MIN: at 07:06

## 2025-01-01 RX ADMIN — Medication 4: at 23:35

## 2025-01-01 RX ADMIN — MUPIROCIN 1 APPLICATION(S): 2 CREAM TOPICAL at 05:05

## 2025-01-01 RX ADMIN — MIDODRINE HYDROCHLORIDE 30 MILLIGRAM(S): 5 TABLET ORAL at 14:27

## 2025-01-01 RX ADMIN — PANTOPRAZOLE 40 MILLIGRAM(S): 20 TABLET, DELAYED RELEASE ORAL at 17:00

## 2025-01-01 RX ADMIN — URSODIOL 300 MILLIGRAM(S): 250 TABLET, FILM COATED ORAL at 18:46

## 2025-01-01 RX ADMIN — HYDROMORPHONE HYDROCHLORIDE 0.5 MILLIGRAM(S): 4 INJECTION, SOLUTION INTRAMUSCULAR; INTRAVENOUS; SUBCUTANEOUS at 04:30

## 2025-01-01 RX ADMIN — PHENYLEPHRINE HYDROCHLORIDE 74.6 MICROGRAM(S)/KG/MIN: 10 INJECTION INTRAVENOUS at 00:37

## 2025-01-01 RX ADMIN — CYCLOSPORINE 25 MILLIGRAM(S): 100 CAPSULE, GELATIN COATED ORAL at 16:25

## 2025-01-01 RX ADMIN — BUDESONIDE 0.5 MILLIGRAM(S): 9 TABLET, EXTENDED RELEASE ORAL at 05:06

## 2025-01-01 RX ADMIN — DIPHENOXYLATE HYDROCHLORIDE AND ATROPINE SULFATE 1 TABLET(S): 2.5; .025 TABLET ORAL at 17:30

## 2025-01-01 RX ADMIN — Medication 100 MILLIGRAM(S): at 14:36

## 2025-01-01 RX ADMIN — Medication 4 MILLILITER(S): at 23:23

## 2025-01-01 RX ADMIN — CYCLOSPORINE 25 MILLIGRAM(S): 100 CAPSULE, GELATIN COATED ORAL at 20:54

## 2025-01-01 RX ADMIN — PHENYLEPHRINE HYDROCHLORIDE 7.46 MICROGRAM(S)/KG/MIN: 10 INJECTION INTRAVENOUS at 11:25

## 2025-01-01 RX ADMIN — ANTISEPTIC SURGICAL SCRUB 15 MILLILITER(S): 0.04 SOLUTION TOPICAL at 05:32

## 2025-01-01 RX ADMIN — PHYTONADIONE 102 MILLIGRAM(S): 2 INJECTION, EMULSION INTRAMUSCULAR; INTRAVENOUS; SUBCUTANEOUS at 16:20

## 2025-01-01 RX ADMIN — PANTOPRAZOLE 40 MILLIGRAM(S): 20 TABLET, DELAYED RELEASE ORAL at 17:02

## 2025-01-01 RX ADMIN — GANCICLOVIR 100 MILLIGRAM(S): 250 CAPSULE ORAL at 05:55

## 2025-01-01 RX ADMIN — ANTISEPTIC SURGICAL SCRUB 15 MILLILITER(S): 0.04 SOLUTION TOPICAL at 17:31

## 2025-01-01 RX ADMIN — IMIPENEM AND CILASTATIN 100 MILLIGRAM(S): 250; 250 INJECTION, POWDER, FOR SOLUTION INTRAVENOUS at 09:12

## 2025-01-01 RX ADMIN — VORICONAZOLE 125 MILLIGRAM(S): 10 INJECTION, POWDER, LYOPHILIZED, FOR SOLUTION INTRAVENOUS at 06:03

## 2025-01-01 RX ADMIN — URSODIOL 300 MILLIGRAM(S): 250 TABLET, FILM COATED ORAL at 18:51

## 2025-01-01 RX ADMIN — LOPERAMIDE HYDROCHLORIDE 4 MILLIGRAM(S): 2 CAPSULE ORAL at 12:45

## 2025-01-01 RX ADMIN — BUDESONIDE 0.5 MILLIGRAM(S): 9 TABLET, EXTENDED RELEASE ORAL at 17:02

## 2025-01-01 RX ADMIN — ALBUMIN HUMAN 50 MILLILITER(S): 50 SOLUTION INTRAVENOUS at 21:05

## 2025-01-01 RX ADMIN — ALBUMIN HUMAN 500 MILLILITER(S): 50 SOLUTION INTRAVENOUS at 00:49

## 2025-01-01 RX ADMIN — Medication 32 MILLIGRAM(S): at 11:58

## 2025-01-01 RX ADMIN — SULFAMETHOXAZOLE AND TRIMETHOPRIM 268.75 MILLIGRAM(S): 400; 80 TABLET ORAL at 01:14

## 2025-01-01 RX ADMIN — Medication 200 GRAM(S): at 20:31

## 2025-01-01 RX ADMIN — OXYCODONE HYDROCHLORIDE 5 MILLIGRAM(S): 30 TABLET ORAL at 04:20

## 2025-01-01 RX ADMIN — BUDESONIDE 0.5 MILLIGRAM(S): 9 TABLET, EXTENDED RELEASE ORAL at 17:16

## 2025-01-01 RX ADMIN — HYDROMORPHONE HYDROCHLORIDE 0.25 MILLIGRAM(S): 4 INJECTION, SOLUTION INTRAMUSCULAR; INTRAVENOUS; SUBCUTANEOUS at 05:16

## 2025-01-01 RX ADMIN — Medication 4 UNIT(S): at 23:36

## 2025-01-01 RX ADMIN — SULFAMETHOXAZOLE AND TRIMETHOPRIM 318.75 MILLIGRAM(S): 400; 80 TABLET ORAL at 08:31

## 2025-01-01 RX ADMIN — Medication 100 MILLIGRAM(S): at 13:41

## 2025-01-01 RX ADMIN — URSODIOL 300 MILLIGRAM(S): 250 TABLET, FILM COATED ORAL at 05:54

## 2025-01-01 RX ADMIN — Medication 2: at 12:55

## 2025-01-01 RX ADMIN — OXYCODONE HYDROCHLORIDE 5 MILLIGRAM(S): 30 TABLET ORAL at 03:30

## 2025-01-01 RX ADMIN — MIDODRINE HYDROCHLORIDE 10 MILLIGRAM(S): 5 TABLET ORAL at 05:46

## 2025-01-01 RX ADMIN — ALBUMIN HUMAN 50 MILLILITER(S): 50 SOLUTION INTRAVENOUS at 22:36

## 2025-01-01 RX ADMIN — MUPIROCIN 1 APPLICATION(S): 2 CREAM TOPICAL at 05:48

## 2025-01-01 RX ADMIN — ALBUMIN HUMAN 250 MILLILITER(S): 50 SOLUTION INTRAVENOUS at 17:35

## 2025-01-01 RX ADMIN — Medication 100 MILLIGRAM(S): at 05:26

## 2025-01-01 RX ADMIN — GANCICLOVIR 100 MILLIGRAM(S): 250 CAPSULE ORAL at 05:28

## 2025-01-01 RX ADMIN — ALBUMIN HUMAN 500 MILLILITER(S): 50 SOLUTION INTRAVENOUS at 23:09

## 2025-01-01 RX ADMIN — DIPHENOXYLATE HYDROCHLORIDE AND ATROPINE SULFATE 2 TABLET(S): 2.5; .025 TABLET ORAL at 05:59

## 2025-01-01 RX ADMIN — VORICONAZOLE 125 MILLIGRAM(S): 10 INJECTION, POWDER, LYOPHILIZED, FOR SOLUTION INTRAVENOUS at 21:56

## 2025-01-01 RX ADMIN — HYDROMORPHONE HYDROCHLORIDE 0.25 MILLIGRAM(S): 4 INJECTION, SOLUTION INTRAMUSCULAR; INTRAVENOUS; SUBCUTANEOUS at 01:45

## 2025-01-01 RX ADMIN — Medication 6 UNIT(S): at 23:09

## 2025-01-01 RX ADMIN — NYSTATIN 1 APPLICATION(S): 100000 POWDER TOPICAL at 05:07

## 2025-01-01 RX ADMIN — PANTOPRAZOLE 40 MILLIGRAM(S): 20 TABLET, DELAYED RELEASE ORAL at 18:01

## 2025-01-01 RX ADMIN — BUDESONIDE 0.5 MILLIGRAM(S): 9 TABLET, EXTENDED RELEASE ORAL at 18:07

## 2025-01-01 RX ADMIN — ALBUMIN HUMAN 1000 MILLILITER(S): 50 SOLUTION INTRAVENOUS at 06:29

## 2025-01-01 RX ADMIN — Medication 2.5 MILLIGRAM(S): at 11:18

## 2025-01-01 RX ADMIN — BUDESONIDE 0.5 MILLIGRAM(S): 9 TABLET, EXTENDED RELEASE ORAL at 17:28

## 2025-01-01 RX ADMIN — NYSTATIN 1 APPLICATION(S): 100000 POWDER TOPICAL at 17:02

## 2025-01-01 RX ADMIN — DIPHENHYDRAMINE HYDROCHLORIDE AND LIDOCAINE HYDROCHLORIDE AND ALUMINUM HYDROXIDE AND MAGNESIUM HYDRO 10 MILLILITER(S): KIT at 11:22

## 2025-01-01 RX ADMIN — ANTISEPTIC SURGICAL SCRUB 15 MILLILITER(S): 0.04 SOLUTION TOPICAL at 05:43

## 2025-01-01 RX ADMIN — PHENYLEPHRINE HYDROCHLORIDE 3.73 MICROGRAM(S)/KG/MIN: 10 INJECTION INTRAVENOUS at 22:07

## 2025-01-01 RX ADMIN — Medication 25 MILLIGRAM(S): at 21:25

## 2025-01-01 RX ADMIN — BUDESONIDE 0.5 MILLIGRAM(S): 9 TABLET, EXTENDED RELEASE ORAL at 17:44

## 2025-01-01 RX ADMIN — CYCLOSPORINE 50 MILLIGRAM(S): 100 CAPSULE, GELATIN COATED ORAL at 07:44

## 2025-01-01 RX ADMIN — IMIPENEM AND CILASTATIN 100 MILLIGRAM(S): 250; 250 INJECTION, POWDER, FOR SOLUTION INTRAVENOUS at 16:30

## 2025-01-01 RX ADMIN — BUDESONIDE 0.5 MILLIGRAM(S): 9 TABLET, EXTENDED RELEASE ORAL at 18:24

## 2025-01-01 RX ADMIN — Medication 4.5 UNIT(S)/MIN: at 23:39

## 2025-01-01 RX ADMIN — GANCICLOVIR 100 MILLIGRAM(S): 250 CAPSULE ORAL at 18:31

## 2025-01-01 RX ADMIN — PANTOPRAZOLE 40 MILLIGRAM(S): 20 TABLET, DELAYED RELEASE ORAL at 17:29

## 2025-01-01 RX ADMIN — PHENYLEPHRINE HYDROCHLORIDE 7.46 MICROGRAM(S)/KG/MIN: 10 INJECTION INTRAVENOUS at 07:07

## 2025-01-01 RX ADMIN — Medication 4: at 19:27

## 2025-01-01 RX ADMIN — ACETAMINOPHEN, DIPHENHYDRAMINE HCL, PHENYLEPHRINE HCL 5 MILLIGRAM(S): 325; 25; 5 TABLET ORAL at 01:33

## 2025-01-01 RX ADMIN — Medication 5 MILLIGRAM(S): at 05:02

## 2025-01-01 RX ADMIN — Medication 1 APPLICATION(S): at 11:02

## 2025-01-01 RX ADMIN — FILGRASTIM-SNDZ 480 MICROGRAM(S): 300 INJECTION, SOLUTION INTRAVENOUS; SUBCUTANEOUS at 10:40

## 2025-01-01 RX ADMIN — Medication 16 MILLIGRAM(S): at 05:02

## 2025-01-01 RX ADMIN — PANTOPRAZOLE 40 MILLIGRAM(S): 20 TABLET, DELAYED RELEASE ORAL at 05:59

## 2025-01-01 RX ADMIN — PHENYLEPHRINE HYDROCHLORIDE 7.46 MICROGRAM(S)/KG/MIN: 10 INJECTION INTRAVENOUS at 07:23

## 2025-01-01 RX ADMIN — GANCICLOVIR 100 MILLIGRAM(S): 250 CAPSULE ORAL at 17:51

## 2025-01-01 RX ADMIN — PHENYLEPHRINE HYDROCHLORIDE 7.46 MICROGRAM(S)/KG/MIN: 10 INJECTION INTRAVENOUS at 17:20

## 2025-01-01 RX ADMIN — IMIPENEM AND CILASTATIN 100 MILLIGRAM(S): 250; 250 INJECTION, POWDER, FOR SOLUTION INTRAVENOUS at 11:04

## 2025-01-01 RX ADMIN — MIDODRINE HYDROCHLORIDE 20 MILLIGRAM(S): 5 TABLET ORAL at 21:07

## 2025-01-01 RX ADMIN — ALBUMIN HUMAN 100 MILLILITER(S): 50 SOLUTION INTRAVENOUS at 21:56

## 2025-01-01 RX ADMIN — PHENYLEPHRINE HYDROCHLORIDE 7.46 MICROGRAM(S)/KG/MIN: 10 INJECTION INTRAVENOUS at 19:46

## 2025-01-01 RX ADMIN — ALBUMIN HUMAN 125 MILLILITER(S): 50 SOLUTION INTRAVENOUS at 22:09

## 2025-01-01 RX ADMIN — Medication 32 MILLIGRAM(S): at 11:24

## 2025-01-01 RX ADMIN — Medication 25 MILLIGRAM(S): at 05:25

## 2025-01-01 RX ADMIN — IMIPENEM AND CILASTATIN 100 MILLIGRAM(S): 250; 250 INJECTION, POWDER, FOR SOLUTION INTRAVENOUS at 03:23

## 2025-01-01 RX ADMIN — Medication 2.5 MILLIGRAM(S): at 11:12

## 2025-01-01 RX ADMIN — PHENYLEPHRINE HYDROCHLORIDE 74.6 MICROGRAM(S)/KG/MIN: 10 INJECTION INTRAVENOUS at 02:26

## 2025-01-01 RX ADMIN — MUPIROCIN 1 APPLICATION(S): 2 CREAM TOPICAL at 17:30

## 2025-01-01 RX ADMIN — CYCLOSPORINE 75 MILLIGRAM(S): 100 CAPSULE, GELATIN COATED ORAL at 19:50

## 2025-01-01 RX ADMIN — MUPIROCIN 1 APPLICATION(S): 2 CREAM TOPICAL at 17:28

## 2025-01-01 RX ADMIN — Medication 4.5 UNIT(S)/MIN: at 00:37

## 2025-01-01 RX ADMIN — BUDESONIDE 0.5 MILLIGRAM(S): 9 TABLET, EXTENDED RELEASE ORAL at 05:01

## 2025-01-01 RX ADMIN — Medication 4: at 23:17

## 2025-01-01 RX ADMIN — MIDODRINE HYDROCHLORIDE 10 MILLIGRAM(S): 5 TABLET ORAL at 05:33

## 2025-01-01 RX ADMIN — MUPIROCIN 1 APPLICATION(S): 2 CREAM TOPICAL at 17:12

## 2025-01-01 RX ADMIN — Medication 25 MILLIGRAM(S): at 18:07

## 2025-01-01 RX ADMIN — Medication 1 APPLICATION(S): at 11:54

## 2025-01-01 RX ADMIN — VORICONAZOLE 125 MILLIGRAM(S): 10 INJECTION, POWDER, LYOPHILIZED, FOR SOLUTION INTRAVENOUS at 07:43

## 2025-01-01 RX ADMIN — BUDESONIDE 0.5 MILLIGRAM(S): 9 TABLET, EXTENDED RELEASE ORAL at 17:24

## 2025-01-01 RX ADMIN — Medication 25 MILLIGRAM(S): at 05:12

## 2025-01-01 RX ADMIN — OXYCODONE HYDROCHLORIDE 5 MILLIGRAM(S): 30 TABLET ORAL at 23:55

## 2025-01-01 RX ADMIN — QUETIAPINE FUMARATE 50 MILLIGRAM(S): 300 TABLET ORAL at 21:35

## 2025-01-01 RX ADMIN — IMIPENEM AND CILASTATIN 100 MILLIGRAM(S): 250; 250 INJECTION, POWDER, FOR SOLUTION INTRAVENOUS at 16:23

## 2025-01-01 RX ADMIN — MORPHINE 2 MILLIGRAM(S): 10 SOLUTION ORAL at 05:12

## 2025-01-01 RX ADMIN — ALBUMIN HUMAN 250 MILLILITER(S): 50 SOLUTION INTRAVENOUS at 12:02

## 2025-01-01 RX ADMIN — PHENYLEPHRINE HYDROCHLORIDE 74.6 MICROGRAM(S)/KG/MIN: 10 INJECTION INTRAVENOUS at 01:27

## 2025-01-01 RX ADMIN — Medication 4 MILLILITER(S): at 17:30

## 2025-01-01 RX ADMIN — ANTISEPTIC SURGICAL SCRUB 1 APPLICATION(S): 0.04 SOLUTION TOPICAL at 05:34

## 2025-01-01 RX ADMIN — FILGRASTIM-SNDZ 480 MICROGRAM(S): 300 INJECTION, SOLUTION INTRAVENOUS; SUBCUTANEOUS at 18:30

## 2025-01-01 RX ADMIN — CASPOFUNGIN ACETATE 250 MILLIGRAM(S): 5 INJECTION, POWDER, LYOPHILIZED, FOR SOLUTION INTRAVENOUS at 13:48

## 2025-01-01 RX ADMIN — ALBUMIN HUMAN 100 MILLILITER(S): 50 SOLUTION INTRAVENOUS at 22:52

## 2025-01-01 RX ADMIN — SULFAMETHOXAZOLE AND TRIMETHOPRIM 268.75 MILLIGRAM(S): 400; 80 TABLET ORAL at 01:11

## 2025-01-01 RX ADMIN — ALBUMIN HUMAN 1000 MILLILITER(S): 50 SOLUTION INTRAVENOUS at 23:34

## 2025-01-01 RX ADMIN — VORICONAZOLE 125 MILLIGRAM(S): 10 INJECTION, POWDER, LYOPHILIZED, FOR SOLUTION INTRAVENOUS at 18:44

## 2025-01-01 RX ADMIN — Medication 100 MILLIGRAM(S): at 05:59

## 2025-01-01 RX ADMIN — Medication 4.5 UNIT(S)/MIN: at 07:40

## 2025-01-01 RX ADMIN — VORICONAZOLE 125 MILLIGRAM(S): 10 INJECTION, POWDER, LYOPHILIZED, FOR SOLUTION INTRAVENOUS at 09:43

## 2025-01-01 RX ADMIN — ANTISEPTIC SURGICAL SCRUB 1 APPLICATION(S): 0.04 SOLUTION TOPICAL at 06:26

## 2025-01-01 RX ADMIN — Medication 6 UNIT(S): at 11:57

## 2025-01-01 RX ADMIN — NYSTATIN 1 APPLICATION(S): 100000 POWDER TOPICAL at 06:27

## 2025-01-01 RX ADMIN — ALTEPLASE 2 MILLIGRAM(S): KIT at 04:00

## 2025-01-01 RX ADMIN — MUPIROCIN 1 APPLICATION(S): 2 CREAM TOPICAL at 06:03

## 2025-01-01 RX ADMIN — Medication 6 UNIT(S): at 05:13

## 2025-01-01 RX ADMIN — PHENYLEPHRINE HYDROCHLORIDE 7.46 MICROGRAM(S)/KG/MIN: 10 INJECTION INTRAVENOUS at 19:19

## 2025-01-01 RX ADMIN — Medication 4: at 12:04

## 2025-01-01 RX ADMIN — ALBUMIN HUMAN 250 MILLILITER(S): 50 SOLUTION INTRAVENOUS at 06:33

## 2025-01-01 RX ADMIN — Medication 2.5 MILLIGRAM(S): at 23:49

## 2025-01-01 RX ADMIN — Medication 6 UNIT(S): at 17:47

## 2025-01-01 RX ADMIN — MUPIROCIN 1 APPLICATION(S): 2 CREAM TOPICAL at 17:29

## 2025-01-01 RX ADMIN — Medication 5 MILLIGRAM(S): at 18:02

## 2025-01-01 RX ADMIN — Medication 5 MILLIGRAM(S): at 08:07

## 2025-01-01 RX ADMIN — Medication 4.5 UNIT(S)/MIN: at 19:31

## 2025-01-01 RX ADMIN — ALBUMIN HUMAN 50 MILLILITER(S): 50 SOLUTION INTRAVENOUS at 03:11

## 2025-01-01 RX ADMIN — Medication 4.5 UNIT(S)/MIN: at 21:06

## 2025-01-01 RX ADMIN — ANTISEPTIC SURGICAL SCRUB 1 APPLICATION(S): 0.04 SOLUTION TOPICAL at 05:03

## 2025-01-01 RX ADMIN — PHENYLEPHRINE HYDROCHLORIDE 3.73 MICROGRAM(S)/KG/MIN: 10 INJECTION INTRAVENOUS at 20:21

## 2025-01-01 RX ADMIN — ANTISEPTIC SURGICAL SCRUB 15 MILLILITER(S): 0.04 SOLUTION TOPICAL at 05:02

## 2025-01-01 RX ADMIN — PHENYLEPHRINE HYDROCHLORIDE 74.6 MICROGRAM(S)/KG/MIN: 10 INJECTION INTRAVENOUS at 19:54

## 2025-01-01 RX ADMIN — ALBUMIN HUMAN 500 MILLILITER(S): 50 SOLUTION INTRAVENOUS at 05:03

## 2025-01-01 RX ADMIN — SULFAMETHOXAZOLE AND TRIMETHOPRIM 318.75 MILLIGRAM(S): 400; 80 TABLET ORAL at 21:59

## 2025-01-01 RX ADMIN — NYSTATIN 1 APPLICATION(S): 100000 POWDER TOPICAL at 18:16

## 2025-01-01 RX ADMIN — Medication 6 UNIT(S): at 12:32

## 2025-01-01 RX ADMIN — Medication 1 APPLICATION(S): at 11:08

## 2025-01-01 RX ADMIN — Medication 2: at 11:43

## 2025-01-01 RX ADMIN — Medication 4.5 UNIT(S)/MIN: at 23:07

## 2025-01-01 RX ADMIN — Medication 4 UNIT(S): at 12:04

## 2025-01-01 RX ADMIN — SULFAMETHOXAZOLE AND TRIMETHOPRIM 259.38 MILLIGRAM(S): 400; 80 TABLET ORAL at 09:57

## 2025-01-01 RX ADMIN — GANCICLOVIR 100 MILLIGRAM(S): 250 CAPSULE ORAL at 18:58

## 2025-01-01 RX ADMIN — ANTISEPTIC SURGICAL SCRUB 1 APPLICATION(S): 0.04 SOLUTION TOPICAL at 05:06

## 2025-01-01 RX ADMIN — NYSTATIN 1 APPLICATION(S): 100000 POWDER TOPICAL at 05:57

## 2025-01-01 RX ADMIN — Medication 16 MILLIGRAM(S): at 05:56

## 2025-01-01 RX ADMIN — DIPHENOXYLATE HYDROCHLORIDE AND ATROPINE SULFATE 2 TABLET(S): 2.5; .025 TABLET ORAL at 11:14

## 2025-01-01 RX ADMIN — PHENYLEPHRINE HYDROCHLORIDE 7.46 MICROGRAM(S)/KG/MIN: 10 INJECTION INTRAVENOUS at 07:43

## 2025-01-01 RX ADMIN — QUETIAPINE FUMARATE 25 MILLIGRAM(S): 300 TABLET ORAL at 21:16

## 2025-01-01 RX ADMIN — MUPIROCIN 1 APPLICATION(S): 2 CREAM TOPICAL at 05:08

## 2025-01-01 RX ADMIN — LOPERAMIDE HYDROCHLORIDE 2 MILLIGRAM(S): 2 CAPSULE ORAL at 06:27

## 2025-01-01 RX ADMIN — MUPIROCIN 1 APPLICATION(S): 2 CREAM TOPICAL at 05:35

## 2025-01-01 RX ADMIN — PANTOPRAZOLE 40 MILLIGRAM(S): 20 TABLET, DELAYED RELEASE ORAL at 05:01

## 2025-01-01 RX ADMIN — ALBUMIN HUMAN 50 MILLILITER(S): 50 SOLUTION INTRAVENOUS at 10:41

## 2025-01-01 RX ADMIN — ANTISEPTIC SURGICAL SCRUB 1 APPLICATION(S): 0.04 SOLUTION TOPICAL at 05:57

## 2025-01-01 RX ADMIN — POTASSIUM PHOSPHATE, MONOBASIC POTASSIUM PHOSPHATE, DIBASIC 62.5 MILLIMOLE(S): 236; 224 INJECTION, SOLUTION INTRAVENOUS at 20:42

## 2025-01-01 RX ADMIN — IMIPENEM AND CILASTATIN 100 MILLIGRAM(S): 250; 250 INJECTION, POWDER, FOR SOLUTION INTRAVENOUS at 09:37

## 2025-01-01 RX ADMIN — GANCICLOVIR 100 MILLIGRAM(S): 250 CAPSULE ORAL at 17:19

## 2025-01-01 RX ADMIN — Medication 5 MILLIGRAM(S): at 05:29

## 2025-01-01 RX ADMIN — MUPIROCIN 1 APPLICATION(S): 2 CREAM TOPICAL at 17:25

## 2025-01-01 RX ADMIN — IMIPENEM AND CILASTATIN 100 MILLIGRAM(S): 250; 250 INJECTION, POWDER, FOR SOLUTION INTRAVENOUS at 05:29

## 2025-01-01 RX ADMIN — Medication 6 UNIT(S): at 05:46

## 2025-01-01 RX ADMIN — GANCICLOVIR 100 MILLIGRAM(S): 250 CAPSULE ORAL at 05:06

## 2025-01-01 RX ADMIN — ANTISEPTIC SURGICAL SCRUB 15 MILLILITER(S): 0.04 SOLUTION TOPICAL at 05:29

## 2025-01-01 RX ADMIN — VORICONAZOLE 125 MILLIGRAM(S): 10 INJECTION, POWDER, LYOPHILIZED, FOR SOLUTION INTRAVENOUS at 20:54

## 2025-01-01 RX ADMIN — IMIPENEM AND CILASTATIN 100 MILLIGRAM(S): 250; 250 INJECTION, POWDER, FOR SOLUTION INTRAVENOUS at 23:57

## 2025-01-01 RX ADMIN — NYSTATIN 1 APPLICATION(S): 100000 POWDER TOPICAL at 18:01

## 2025-01-01 RX ADMIN — IMIPENEM AND CILASTATIN 100 MILLIGRAM(S): 250; 250 INJECTION, POWDER, FOR SOLUTION INTRAVENOUS at 17:39

## 2025-01-01 RX ADMIN — Medication 4 MILLILITER(S): at 05:09

## 2025-01-01 RX ADMIN — HYDROMORPHONE HYDROCHLORIDE 0.5 MILLIGRAM(S): 4 INJECTION, SOLUTION INTRAMUSCULAR; INTRAVENOUS; SUBCUTANEOUS at 04:46

## 2025-01-01 RX ADMIN — MUPIROCIN 1 APPLICATION(S): 2 CREAM TOPICAL at 17:16

## 2025-01-01 RX ADMIN — TOBRAMYCIN 235 MILLIGRAM(S): 40 INJECTION INTRAMUSCULAR; INTRAVENOUS at 17:18

## 2025-01-01 RX ADMIN — FILGRASTIM-SNDZ 480 MICROGRAM(S): 300 INJECTION, SOLUTION INTRAVENOUS; SUBCUTANEOUS at 10:28

## 2025-01-01 RX ADMIN — PHENYLEPHRINE HYDROCHLORIDE 74.6 MICROGRAM(S)/KG/MIN: 10 INJECTION INTRAVENOUS at 05:45

## 2025-01-01 RX ADMIN — PANTOPRAZOLE 40 MILLIGRAM(S): 20 TABLET, DELAYED RELEASE ORAL at 18:09

## 2025-01-01 RX ADMIN — Medication 4 MILLILITER(S): at 17:23

## 2025-01-01 RX ADMIN — Medication 255 MILLIMOLE(S): at 01:34

## 2025-01-01 RX ADMIN — PHENYLEPHRINE HYDROCHLORIDE 74.6 MICROGRAM(S)/KG/MIN: 10 INJECTION INTRAVENOUS at 03:43

## 2025-01-01 RX ADMIN — Medication 25 MILLIGRAM(S): at 17:11

## 2025-01-01 RX ADMIN — ANTISEPTIC SURGICAL SCRUB 1 APPLICATION(S): 0.04 SOLUTION TOPICAL at 05:07

## 2025-01-01 RX ADMIN — CYCLOSPORINE 75 MILLIGRAM(S): 100 CAPSULE, GELATIN COATED ORAL at 07:18

## 2025-01-01 RX ADMIN — ALBUMIN HUMAN 100 MILLILITER(S): 50 SOLUTION INTRAVENOUS at 05:15

## 2025-01-01 RX ADMIN — ALBUMIN HUMAN 100 MILLILITER(S): 50 SOLUTION INTRAVENOUS at 23:57

## 2025-01-01 RX ADMIN — MIDODRINE HYDROCHLORIDE 20 MILLIGRAM(S): 5 TABLET ORAL at 05:29

## 2025-01-01 RX ADMIN — BUDESONIDE 0.5 MILLIGRAM(S): 9 TABLET, EXTENDED RELEASE ORAL at 05:47

## 2025-01-01 RX ADMIN — BUDESONIDE 0.5 MILLIGRAM(S): 9 TABLET, EXTENDED RELEASE ORAL at 17:21

## 2025-01-01 RX ADMIN — Medication 1 APPLICATION(S): at 11:18

## 2025-01-01 RX ADMIN — Medication 5 MILLIGRAM(S): at 17:28

## 2025-01-01 RX ADMIN — ANTISEPTIC SURGICAL SCRUB 15 MILLILITER(S): 0.04 SOLUTION TOPICAL at 05:24

## 2025-01-01 RX ADMIN — MIDODRINE HYDROCHLORIDE 20 MILLIGRAM(S): 5 TABLET ORAL at 13:16

## 2025-01-01 RX ADMIN — ALBUMIN HUMAN 50 MILLILITER(S): 50 SOLUTION INTRAVENOUS at 12:36

## 2025-01-01 RX ADMIN — ALBUMIN HUMAN 500 MILLILITER(S): 50 SOLUTION INTRAVENOUS at 20:07

## 2025-01-01 RX ADMIN — BUDESONIDE 0.5 MILLIGRAM(S): 9 TABLET, EXTENDED RELEASE ORAL at 17:22

## 2025-01-01 RX ADMIN — BUDESONIDE 0.5 MILLIGRAM(S): 9 TABLET, EXTENDED RELEASE ORAL at 17:57

## 2025-01-01 RX ADMIN — HYDROMORPHONE HYDROCHLORIDE 0.25 MILLIGRAM(S): 4 INJECTION, SOLUTION INTRAMUSCULAR; INTRAVENOUS; SUBCUTANEOUS at 11:00

## 2025-01-01 RX ADMIN — IPRATROPIUM BROMIDE AND ALBUTEROL SULFATE 3 MILLILITER(S): .5; 2.5 SOLUTION RESPIRATORY (INHALATION) at 05:08

## 2025-01-01 RX ADMIN — ANTISEPTIC SURGICAL SCRUB 1 APPLICATION(S): 0.04 SOLUTION TOPICAL at 06:17

## 2025-01-01 RX ADMIN — ALBUMIN HUMAN 500 MILLILITER(S): 50 SOLUTION INTRAVENOUS at 02:54

## 2025-01-01 RX ADMIN — ALBUMIN HUMAN 50 MILLILITER(S): 50 SOLUTION INTRAVENOUS at 12:52

## 2025-01-01 RX ADMIN — Medication 100 MILLIGRAM(S): at 21:10

## 2025-01-01 RX ADMIN — PHENYLEPHRINE HYDROCHLORIDE 74.6 MICROGRAM(S)/KG/MIN: 10 INJECTION INTRAVENOUS at 07:40

## 2025-01-01 RX ADMIN — MUPIROCIN 1 APPLICATION(S): 2 CREAM TOPICAL at 05:01

## 2025-01-01 RX ADMIN — PHENYLEPHRINE HYDROCHLORIDE 74.6 MICROGRAM(S)/KG/MIN: 10 INJECTION INTRAVENOUS at 17:47

## 2025-01-01 RX ADMIN — PHENYLEPHRINE HYDROCHLORIDE 74.6 MICROGRAM(S)/KG/MIN: 10 INJECTION INTRAVENOUS at 16:50

## 2025-01-01 RX ADMIN — DIPHENOXYLATE HYDROCHLORIDE AND ATROPINE SULFATE 2 TABLET(S): 2.5; .025 TABLET ORAL at 12:27

## 2025-01-01 RX ADMIN — NYSTATIN 1 APPLICATION(S): 100000 POWDER TOPICAL at 05:48

## 2025-01-01 RX ADMIN — PHENYLEPHRINE HYDROCHLORIDE 74.6 MICROGRAM(S)/KG/MIN: 10 INJECTION INTRAVENOUS at 19:39

## 2025-01-01 RX ADMIN — Medication 16 MILLIGRAM(S): at 06:05

## 2025-01-01 RX ADMIN — HYDROMORPHONE HYDROCHLORIDE 0.25 MILLIGRAM(S): 4 INJECTION, SOLUTION INTRAMUSCULAR; INTRAVENOUS; SUBCUTANEOUS at 15:34

## 2025-01-01 RX ADMIN — GANCICLOVIR 100 MILLIGRAM(S): 250 CAPSULE ORAL at 06:08

## 2025-01-01 RX ADMIN — Medication 4: at 06:17

## 2025-01-01 RX ADMIN — OXYCODONE HYDROCHLORIDE 5 MILLIGRAM(S): 30 TABLET ORAL at 06:50

## 2025-01-01 RX ADMIN — Medication 4 MILLILITER(S): at 05:22

## 2025-01-01 RX ADMIN — Medication 2.5 MILLIGRAM(S): at 17:01

## 2025-01-01 RX ADMIN — MUPIROCIN 1 APPLICATION(S): 2 CREAM TOPICAL at 17:02

## 2025-01-01 RX ADMIN — LOPERAMIDE HYDROCHLORIDE 2 MILLIGRAM(S): 2 CAPSULE ORAL at 00:08

## 2025-01-01 RX ADMIN — Medication 100 MILLIGRAM(S): at 05:57

## 2025-01-01 RX ADMIN — PHENYLEPHRINE HYDROCHLORIDE 74.6 MICROGRAM(S)/KG/MIN: 10 INJECTION INTRAVENOUS at 07:30

## 2025-01-01 RX ADMIN — ANTISEPTIC SURGICAL SCRUB 15 MILLILITER(S): 0.04 SOLUTION TOPICAL at 05:13

## 2025-01-01 RX ADMIN — LOPERAMIDE HYDROCHLORIDE 2 MILLIGRAM(S): 2 CAPSULE ORAL at 05:32

## 2025-01-01 RX ADMIN — DIPHENOXYLATE HYDROCHLORIDE AND ATROPINE SULFATE 1 TABLET(S): 2.5; .025 TABLET ORAL at 00:01

## 2025-01-01 RX ADMIN — Medication 8 UNIT(S): at 06:13

## 2025-01-01 RX ADMIN — ALBUMIN HUMAN 100 MILLILITER(S): 50 SOLUTION INTRAVENOUS at 18:12

## 2025-01-01 RX ADMIN — Medication 25 MILLIGRAM(S): at 13:14

## 2025-01-01 RX ADMIN — Medication 100 MILLIGRAM(S): at 18:46

## 2025-01-01 RX ADMIN — PHENYLEPHRINE HYDROCHLORIDE 3.73 MICROGRAM(S)/KG/MIN: 10 INJECTION INTRAVENOUS at 07:59

## 2025-01-01 RX ADMIN — ANTISEPTIC SURGICAL SCRUB 1 APPLICATION(S): 0.04 SOLUTION TOPICAL at 05:40

## 2025-01-01 RX ADMIN — GANCICLOVIR 100 MILLIGRAM(S): 250 CAPSULE ORAL at 06:12

## 2025-01-01 RX ADMIN — Medication 100 MILLIGRAM(S): at 21:34

## 2025-01-01 RX ADMIN — PANTOPRAZOLE 40 MILLIGRAM(S): 20 TABLET, DELAYED RELEASE ORAL at 06:31

## 2025-01-01 RX ADMIN — LOPERAMIDE HYDROCHLORIDE 4 MILLIGRAM(S): 2 CAPSULE ORAL at 17:42

## 2025-01-01 RX ADMIN — Medication 25 MILLIGRAM(S): at 21:34

## 2025-01-01 RX ADMIN — GANCICLOVIR 100 MILLIGRAM(S): 250 CAPSULE ORAL at 18:35

## 2025-01-01 RX ADMIN — Medication 4: at 06:06

## 2025-01-01 RX ADMIN — MIDODRINE HYDROCHLORIDE 20 MILLIGRAM(S): 5 TABLET ORAL at 14:04

## 2025-01-01 RX ADMIN — Medication 25 MILLIGRAM(S): at 21:12

## 2025-01-01 RX ADMIN — Medication 100 MILLIGRAM(S): at 13:08

## 2025-01-01 RX ADMIN — MIDODRINE HYDROCHLORIDE 30 MILLIGRAM(S): 5 TABLET ORAL at 05:44

## 2025-01-01 RX ADMIN — ALBUMIN HUMAN 125 MILLILITER(S): 50 SOLUTION INTRAVENOUS at 18:09

## 2025-01-01 RX ADMIN — CYCLOSPORINE 75 MILLIGRAM(S): 100 CAPSULE, GELATIN COATED ORAL at 08:17

## 2025-01-01 RX ADMIN — BUMETANIDE 2 MILLIGRAM(S): 2 TABLET ORAL at 16:15

## 2025-01-01 RX ADMIN — Medication 2.5 MILLIGRAM(S): at 18:07

## 2025-01-01 RX ADMIN — NYSTATIN 1 APPLICATION(S): 100000 POWDER TOPICAL at 17:27

## 2025-01-01 RX ADMIN — MIDODRINE HYDROCHLORIDE 30 MILLIGRAM(S): 5 TABLET ORAL at 21:06

## 2025-01-01 RX ADMIN — HYDROMORPHONE HYDROCHLORIDE 0.25 MILLIGRAM(S): 4 INJECTION, SOLUTION INTRAMUSCULAR; INTRAVENOUS; SUBCUTANEOUS at 15:17

## 2025-01-01 RX ADMIN — HYDROMORPHONE HYDROCHLORIDE 0.25 MILLIGRAM(S): 4 INJECTION, SOLUTION INTRAMUSCULAR; INTRAVENOUS; SUBCUTANEOUS at 21:15

## 2025-01-01 RX ADMIN — OXYCODONE HYDROCHLORIDE 5 MILLIGRAM(S): 30 TABLET ORAL at 09:35

## 2025-01-01 RX ADMIN — HYDROMORPHONE HYDROCHLORIDE 0.25 MILLIGRAM(S): 4 INJECTION, SOLUTION INTRAMUSCULAR; INTRAVENOUS; SUBCUTANEOUS at 17:29

## 2025-01-01 RX ADMIN — Medication 4: at 17:47

## 2025-01-01 RX ADMIN — PHENYLEPHRINE HYDROCHLORIDE 74.6 MICROGRAM(S)/KG/MIN: 10 INJECTION INTRAVENOUS at 12:50

## 2025-01-01 RX ADMIN — SULFAMETHOXAZOLE AND TRIMETHOPRIM 80 MILLIGRAM(S): 400; 80 TABLET ORAL at 11:14

## 2025-01-01 RX ADMIN — PHENYLEPHRINE HYDROCHLORIDE 3.73 MICROGRAM(S)/KG/MIN: 10 INJECTION INTRAVENOUS at 07:31

## 2025-01-01 RX ADMIN — ANTISEPTIC SURGICAL SCRUB 15 MILLILITER(S): 0.04 SOLUTION TOPICAL at 18:07

## 2025-01-01 RX ADMIN — ALBUMIN HUMAN 50 MILLILITER(S): 50 SOLUTION INTRAVENOUS at 11:15

## 2025-01-01 RX ADMIN — PANTOPRAZOLE 40 MILLIGRAM(S): 20 TABLET, DELAYED RELEASE ORAL at 05:16

## 2025-01-01 RX ADMIN — NYSTATIN 1 APPLICATION(S): 100000 POWDER TOPICAL at 05:03

## 2025-01-01 RX ADMIN — URSODIOL 300 MILLIGRAM(S): 250 TABLET, FILM COATED ORAL at 05:23

## 2025-01-01 RX ADMIN — PANTOPRAZOLE 40 MILLIGRAM(S): 20 TABLET, DELAYED RELEASE ORAL at 17:07

## 2025-01-01 RX ADMIN — CYCLOSPORINE 75 MILLIGRAM(S): 100 CAPSULE, GELATIN COATED ORAL at 09:18

## 2025-01-01 RX ADMIN — MIDODRINE HYDROCHLORIDE 10 MILLIGRAM(S): 5 TABLET ORAL at 21:32

## 2025-01-01 RX ADMIN — LOPERAMIDE HYDROCHLORIDE 2 MILLIGRAM(S): 2 CAPSULE ORAL at 23:49

## 2025-01-01 RX ADMIN — ACETAMINOPHEN, DIPHENHYDRAMINE HCL, PHENYLEPHRINE HCL 5 MILLIGRAM(S): 325; 25; 5 TABLET ORAL at 21:46

## 2025-01-01 RX ADMIN — SULFAMETHOXAZOLE AND TRIMETHOPRIM 268.75 MILLIGRAM(S): 400; 80 TABLET ORAL at 21:06

## 2025-01-01 RX ADMIN — Medication 5 MILLIGRAM(S): at 00:27

## 2025-01-01 RX ADMIN — HYDROMORPHONE HYDROCHLORIDE 0.25 MILLIGRAM(S): 4 INJECTION, SOLUTION INTRAMUSCULAR; INTRAVENOUS; SUBCUTANEOUS at 04:55

## 2025-01-01 RX ADMIN — Medication 4.5 UNIT(S)/MIN: at 19:54

## 2025-01-01 RX ADMIN — NYSTATIN 1 APPLICATION(S): 100000 POWDER TOPICAL at 05:47

## 2025-01-01 RX ADMIN — MUPIROCIN 1 APPLICATION(S): 2 CREAM TOPICAL at 17:08

## 2025-01-01 RX ADMIN — IMIPENEM AND CILASTATIN 100 MILLIGRAM(S): 250; 250 INJECTION, POWDER, FOR SOLUTION INTRAVENOUS at 04:04

## 2025-01-01 RX ADMIN — ALBUMIN HUMAN 50 MILLILITER(S): 50 SOLUTION INTRAVENOUS at 03:14

## 2025-01-01 RX ADMIN — ALBUMIN HUMAN 500 MILLILITER(S): 50 SOLUTION INTRAVENOUS at 04:06

## 2025-01-01 RX ADMIN — Medication 4: at 13:07

## 2025-01-01 RX ADMIN — ANTISEPTIC SURGICAL SCRUB 1 APPLICATION(S): 0.04 SOLUTION TOPICAL at 05:32

## 2025-01-01 RX ADMIN — MORPHINE 2 MILLIGRAM(S): 10 SOLUTION ORAL at 23:56

## 2025-01-01 RX ADMIN — BUDESONIDE 0.5 MILLIGRAM(S): 9 TABLET, EXTENDED RELEASE ORAL at 05:38

## 2025-01-01 RX ADMIN — MUPIROCIN 1 APPLICATION(S): 2 CREAM TOPICAL at 18:23

## 2025-01-01 RX ADMIN — Medication 1 APPLICATION(S): at 11:14

## 2025-01-01 RX ADMIN — ALBUMIN HUMAN 500 MILLILITER(S): 50 SOLUTION INTRAVENOUS at 19:30

## 2025-01-01 RX ADMIN — ALBUMIN HUMAN 500 MILLILITER(S): 50 SOLUTION INTRAVENOUS at 11:59

## 2025-01-01 RX ADMIN — PANTOPRAZOLE 40 MILLIGRAM(S): 20 TABLET, DELAYED RELEASE ORAL at 17:20

## 2025-01-01 RX ADMIN — Medication 5 MILLIGRAM(S): at 17:27

## 2025-01-01 RX ADMIN — PANTOPRAZOLE 40 MILLIGRAM(S): 20 TABLET, DELAYED RELEASE ORAL at 05:42

## 2025-01-01 RX ADMIN — PHENYLEPHRINE HYDROCHLORIDE 3.73 MICROGRAM(S)/KG/MIN: 10 INJECTION INTRAVENOUS at 05:58

## 2025-01-01 RX ADMIN — PHENYLEPHRINE HYDROCHLORIDE 7.46 MICROGRAM(S)/KG/MIN: 10 INJECTION INTRAVENOUS at 00:15

## 2025-01-01 RX ADMIN — CYCLOSPORINE 50 MILLIGRAM(S): 100 CAPSULE, GELATIN COATED ORAL at 20:40

## 2025-01-01 RX ADMIN — Medication 4 MILLILITER(S): at 17:18

## 2025-01-01 RX ADMIN — Medication 6 UNIT(S): at 18:17

## 2025-01-01 RX ADMIN — IMIPENEM AND CILASTATIN 100 MILLIGRAM(S): 250; 250 INJECTION, POWDER, FOR SOLUTION INTRAVENOUS at 00:27

## 2025-01-01 RX ADMIN — MIDODRINE HYDROCHLORIDE 30 MILLIGRAM(S): 5 TABLET ORAL at 06:31

## 2025-01-01 RX ADMIN — MUPIROCIN 1 APPLICATION(S): 2 CREAM TOPICAL at 05:15

## 2025-01-01 RX ADMIN — IMIPENEM AND CILASTATIN 100 MILLIGRAM(S): 250; 250 INJECTION, POWDER, FOR SOLUTION INTRAVENOUS at 23:40

## 2025-01-01 RX ADMIN — PHENYLEPHRINE HYDROCHLORIDE 3.73 MICROGRAM(S)/KG/MIN: 10 INJECTION INTRAVENOUS at 19:09

## 2025-01-01 RX ADMIN — CEFIDEROCOL SULFATE TOSYLATE 33.33 MILLIGRAM(S): 1 INJECTION, POWDER, FOR SOLUTION INTRAVENOUS at 20:50

## 2025-01-01 RX ADMIN — QUETIAPINE FUMARATE 25 MILLIGRAM(S): 300 TABLET ORAL at 21:47

## 2025-01-01 RX ADMIN — ANTISEPTIC SURGICAL SCRUB 15 MILLILITER(S): 0.04 SOLUTION TOPICAL at 17:08

## 2025-01-01 RX ADMIN — SULFAMETHOXAZOLE AND TRIMETHOPRIM 268.75 MILLIGRAM(S): 400; 80 TABLET ORAL at 20:23

## 2025-01-01 RX ADMIN — MIDODRINE HYDROCHLORIDE 20 MILLIGRAM(S): 5 TABLET ORAL at 05:03

## 2025-01-01 RX ADMIN — ALBUMIN HUMAN 50 MILLILITER(S): 50 SOLUTION INTRAVENOUS at 15:58

## 2025-01-01 RX ADMIN — HYDROMORPHONE HYDROCHLORIDE 0.25 MILLIGRAM(S): 4 INJECTION, SOLUTION INTRAMUSCULAR; INTRAVENOUS; SUBCUTANEOUS at 22:15

## 2025-01-01 RX ADMIN — Medication 25 MILLIGRAM(S): at 22:07

## 2025-01-01 RX ADMIN — Medication 100 MILLIGRAM(S): at 05:45

## 2025-01-01 RX ADMIN — PHENYLEPHRINE HYDROCHLORIDE 74.6 MICROGRAM(S)/KG/MIN: 10 INJECTION INTRAVENOUS at 21:06

## 2025-01-01 RX ADMIN — IMIPENEM AND CILASTATIN 100 MILLIGRAM(S): 250; 250 INJECTION, POWDER, FOR SOLUTION INTRAVENOUS at 09:56

## 2025-01-01 RX ADMIN — Medication 25 MILLIGRAM(S): at 13:09

## 2025-01-01 RX ADMIN — ALTEPLASE 2 MILLIGRAM(S): KIT at 16:48

## 2025-01-01 RX ADMIN — IMIPENEM AND CILASTATIN 100 MILLIGRAM(S): 250; 250 INJECTION, POWDER, FOR SOLUTION INTRAVENOUS at 10:40

## 2025-01-01 RX ADMIN — HYDROMORPHONE HYDROCHLORIDE 0.25 MILLIGRAM(S): 4 INJECTION, SOLUTION INTRAMUSCULAR; INTRAVENOUS; SUBCUTANEOUS at 23:07

## 2025-01-01 RX ADMIN — LOPERAMIDE HYDROCHLORIDE 4 MILLIGRAM(S): 2 CAPSULE ORAL at 05:23

## 2025-01-01 RX ADMIN — Medication 2.5 MILLIGRAM(S): at 00:36

## 2025-01-01 RX ADMIN — POTASSIUM CHLORIDE 100 MILLIEQUIVALENT(S): 750 TABLET, EXTENDED RELEASE ORAL at 07:47

## 2025-01-01 RX ADMIN — MIDODRINE HYDROCHLORIDE 10 MILLIGRAM(S): 5 TABLET ORAL at 13:47

## 2025-01-01 RX ADMIN — ALBUMIN HUMAN 50 MILLILITER(S): 50 SOLUTION INTRAVENOUS at 21:06

## 2025-01-01 RX ADMIN — MIDODRINE HYDROCHLORIDE 20 MILLIGRAM(S): 5 TABLET ORAL at 05:24

## 2025-01-01 RX ADMIN — DIPHENOXYLATE HYDROCHLORIDE AND ATROPINE SULFATE 2 TABLET(S): 2.5; .025 TABLET ORAL at 23:49

## 2025-01-01 RX ADMIN — Medication 2: at 18:17

## 2025-01-01 RX ADMIN — Medication 6 UNIT(S): at 11:44

## 2025-01-01 RX ADMIN — ALBUMIN HUMAN 250 MILLILITER(S): 50 SOLUTION INTRAVENOUS at 23:28

## 2025-01-01 RX ADMIN — DIPHENOXYLATE HYDROCHLORIDE AND ATROPINE SULFATE 2 TABLET(S): 2.5; .025 TABLET ORAL at 23:53

## 2025-01-01 RX ADMIN — HYDROMORPHONE HYDROCHLORIDE 0.25 MILLIGRAM(S): 4 INJECTION, SOLUTION INTRAMUSCULAR; INTRAVENOUS; SUBCUTANEOUS at 11:39

## 2025-01-01 RX ADMIN — Medication 100 MILLIGRAM(S): at 21:30

## 2025-01-01 RX ADMIN — BUDESONIDE 0.5 MILLIGRAM(S): 9 TABLET, EXTENDED RELEASE ORAL at 05:27

## 2025-01-01 RX ADMIN — GANCICLOVIR 100 MILLIGRAM(S): 250 CAPSULE ORAL at 05:29

## 2025-01-01 RX ADMIN — IMIPENEM AND CILASTATIN 100 MILLIGRAM(S): 250; 250 INJECTION, POWDER, FOR SOLUTION INTRAVENOUS at 17:27

## 2025-01-01 RX ADMIN — QUETIAPINE FUMARATE 50 MILLIGRAM(S): 300 TABLET ORAL at 21:46

## 2025-01-01 RX ADMIN — Medication 25 MILLIGRAM(S): at 06:28

## 2025-01-01 RX ADMIN — BUDESONIDE 0.5 MILLIGRAM(S): 9 TABLET, EXTENDED RELEASE ORAL at 17:53

## 2025-01-01 RX ADMIN — CYCLOSPORINE 75 MILLIGRAM(S): 100 CAPSULE, GELATIN COATED ORAL at 07:59

## 2025-01-01 RX ADMIN — NYSTATIN 1 APPLICATION(S): 100000 POWDER TOPICAL at 17:31

## 2025-01-01 RX ADMIN — MIDODRINE HYDROCHLORIDE 20 MILLIGRAM(S): 5 TABLET ORAL at 13:09

## 2025-01-01 RX ADMIN — PHYTONADIONE 5 MILLIGRAM(S): 2 INJECTION, EMULSION INTRAMUSCULAR; INTRAVENOUS; SUBCUTANEOUS at 09:40

## 2025-01-01 RX ADMIN — IMIPENEM AND CILASTATIN 100 MILLIGRAM(S): 250; 250 INJECTION, POWDER, FOR SOLUTION INTRAVENOUS at 03:00

## 2025-01-01 RX ADMIN — HYDROMORPHONE HYDROCHLORIDE 0.25 MILLIGRAM(S): 4 INJECTION, SOLUTION INTRAMUSCULAR; INTRAVENOUS; SUBCUTANEOUS at 16:29

## 2025-01-01 RX ADMIN — ANTISEPTIC SURGICAL SCRUB 15 MILLILITER(S): 0.04 SOLUTION TOPICAL at 17:12

## 2025-01-01 RX ADMIN — MUPIROCIN 1 APPLICATION(S): 2 CREAM TOPICAL at 05:23

## 2025-01-01 RX ADMIN — Medication 4 MILLILITER(S): at 17:11

## 2025-01-01 RX ADMIN — PANTOPRAZOLE 40 MILLIGRAM(S): 20 TABLET, DELAYED RELEASE ORAL at 05:46

## 2025-01-01 RX ADMIN — Medication 25 MILLIGRAM(S): at 13:13

## 2025-01-01 RX ADMIN — Medication 4: at 18:39

## 2025-01-01 RX ADMIN — MIDODRINE HYDROCHLORIDE 10 MILLIGRAM(S): 5 TABLET ORAL at 06:00

## 2025-01-01 RX ADMIN — NYSTATIN 1 APPLICATION(S): 100000 POWDER TOPICAL at 05:43

## 2025-01-01 RX ADMIN — DIPHENOXYLATE HYDROCHLORIDE AND ATROPINE SULFATE 2 TABLET(S): 2.5; .025 TABLET ORAL at 23:45

## 2025-01-01 RX ADMIN — PHENYLEPHRINE HYDROCHLORIDE 7.46 MICROGRAM(S)/KG/MIN: 10 INJECTION INTRAVENOUS at 23:39

## 2025-01-01 RX ADMIN — Medication 25 MILLIGRAM(S): at 13:42

## 2025-01-01 RX ADMIN — ALBUMIN HUMAN 50 MILLILITER(S): 50 SOLUTION INTRAVENOUS at 23:45

## 2025-01-01 RX ADMIN — ALBUMIN HUMAN 50 MILLILITER(S): 50 SOLUTION INTRAVENOUS at 08:42

## 2025-01-01 RX ADMIN — PHENYLEPHRINE HYDROCHLORIDE 74.6 MICROGRAM(S)/KG/MIN: 10 INJECTION INTRAVENOUS at 19:57

## 2025-01-01 RX ADMIN — Medication 5 MILLIGRAM(S): at 05:04

## 2025-01-01 RX ADMIN — OXYCODONE HYDROCHLORIDE 5 MILLIGRAM(S): 30 TABLET ORAL at 05:30

## 2025-01-01 RX ADMIN — MUPIROCIN 1 APPLICATION(S): 2 CREAM TOPICAL at 05:03

## 2025-01-01 RX ADMIN — Medication 4.5 UNIT(S)/MIN: at 07:52

## 2025-01-01 RX ADMIN — PHENYLEPHRINE HYDROCHLORIDE 7.46 MICROGRAM(S)/KG/MIN: 10 INJECTION INTRAVENOUS at 00:27

## 2025-01-01 RX ADMIN — PHENYLEPHRINE HYDROCHLORIDE 7.46 MICROGRAM(S)/KG/MIN: 10 INJECTION INTRAVENOUS at 22:51

## 2025-01-01 RX ADMIN — ALBUMIN HUMAN 250 MILLILITER(S): 50 SOLUTION INTRAVENOUS at 22:09

## 2025-01-01 RX ADMIN — LOPERAMIDE HYDROCHLORIDE 4 MILLIGRAM(S): 2 CAPSULE ORAL at 12:27

## 2025-01-01 RX ADMIN — Medication 4.5 UNIT(S)/MIN: at 08:16

## 2025-01-01 RX ADMIN — OXYCODONE HYDROCHLORIDE 5 MILLIGRAM(S): 30 TABLET ORAL at 03:15

## 2025-01-01 RX ADMIN — GANCICLOVIR 100 MILLIGRAM(S): 250 CAPSULE ORAL at 17:22

## 2025-01-01 RX ADMIN — ALBUMIN HUMAN 50 MILLILITER(S): 50 SOLUTION INTRAVENOUS at 23:40

## 2025-01-01 RX ADMIN — MIDODRINE HYDROCHLORIDE 20 MILLIGRAM(S): 5 TABLET ORAL at 21:15

## 2025-01-01 RX ADMIN — ACETAMINOPHEN, DIPHENHYDRAMINE HCL, PHENYLEPHRINE HCL 5 MILLIGRAM(S): 325; 25; 5 TABLET ORAL at 21:16

## 2025-01-01 RX ADMIN — OXYCODONE HYDROCHLORIDE 5 MILLIGRAM(S): 30 TABLET ORAL at 20:31

## 2025-01-01 RX ADMIN — Medication 32 MILLIGRAM(S): at 11:07

## 2025-01-01 RX ADMIN — MIDODRINE HYDROCHLORIDE 10 MILLIGRAM(S): 5 TABLET ORAL at 22:07

## 2025-01-01 RX ADMIN — Medication 4.5 UNIT(S)/MIN: at 07:22

## 2025-01-01 RX ADMIN — CYCLOSPORINE 25 MILLIGRAM(S): 100 CAPSULE, GELATIN COATED ORAL at 19:30

## 2025-01-01 RX ADMIN — Medication 5 MILLIGRAM(S): at 18:07

## 2025-01-01 RX ADMIN — POTASSIUM CHLORIDE 100 MILLIEQUIVALENT(S): 750 TABLET, EXTENDED RELEASE ORAL at 04:47

## 2025-01-01 RX ADMIN — PANTOPRAZOLE 40 MILLIGRAM(S): 20 TABLET, DELAYED RELEASE ORAL at 17:04

## 2025-01-01 RX ADMIN — DIPHENOXYLATE HYDROCHLORIDE AND ATROPINE SULFATE 2 TABLET(S): 2.5; .025 TABLET ORAL at 23:59

## 2025-01-01 RX ADMIN — ALBUMIN HUMAN 50 MILLILITER(S): 50 SOLUTION INTRAVENOUS at 11:08

## 2025-01-01 RX ADMIN — ALBUMIN HUMAN 50 MILLILITER(S): 50 SOLUTION INTRAVENOUS at 05:32

## 2025-01-01 RX ADMIN — LOPERAMIDE HYDROCHLORIDE 2 MILLIGRAM(S): 2 CAPSULE ORAL at 17:25

## 2025-01-01 RX ADMIN — ANTISEPTIC SURGICAL SCRUB 15 MILLILITER(S): 0.04 SOLUTION TOPICAL at 06:27

## 2025-01-01 RX ADMIN — IMIPENEM AND CILASTATIN 100 MILLIGRAM(S): 250; 250 INJECTION, POWDER, FOR SOLUTION INTRAVENOUS at 17:01

## 2025-01-01 RX ADMIN — ALBUMIN HUMAN 100 MILLILITER(S): 50 SOLUTION INTRAVENOUS at 09:39

## 2025-01-01 RX ADMIN — VORICONAZOLE 125 MILLIGRAM(S): 10 INJECTION, POWDER, LYOPHILIZED, FOR SOLUTION INTRAVENOUS at 21:52

## 2025-01-01 RX ADMIN — URSODIOL 300 MILLIGRAM(S): 250 TABLET, FILM COATED ORAL at 05:14

## 2025-01-01 RX ADMIN — ALBUMIN HUMAN 50 MILLILITER(S): 50 SOLUTION INTRAVENOUS at 23:21

## 2025-01-01 RX ADMIN — DIPHENOXYLATE HYDROCHLORIDE AND ATROPINE SULFATE 2 TABLET(S): 2.5; .025 TABLET ORAL at 17:26

## 2025-01-01 RX ADMIN — PHENYLEPHRINE HYDROCHLORIDE 3.73 MICROGRAM(S)/KG/MIN: 10 INJECTION INTRAVENOUS at 07:37

## 2025-01-01 RX ADMIN — LOPERAMIDE HYDROCHLORIDE 2 MILLIGRAM(S): 2 CAPSULE ORAL at 23:53

## 2025-01-01 RX ADMIN — VORICONAZOLE 125 MILLIGRAM(S): 10 INJECTION, POWDER, LYOPHILIZED, FOR SOLUTION INTRAVENOUS at 19:45

## 2025-01-01 RX ADMIN — ACETAMINOPHEN, DIPHENHYDRAMINE HCL, PHENYLEPHRINE HCL 5 MILLIGRAM(S): 325; 25; 5 TABLET ORAL at 21:34

## 2025-01-01 RX ADMIN — GANCICLOVIR 100 MILLIGRAM(S): 250 CAPSULE ORAL at 18:22

## 2025-01-01 RX ADMIN — MIDODRINE HYDROCHLORIDE 30 MILLIGRAM(S): 5 TABLET ORAL at 13:43

## 2025-01-01 RX ADMIN — Medication 5 MILLIGRAM(S): at 17:31

## 2025-01-01 RX ADMIN — ACETAMINOPHEN, DIPHENHYDRAMINE HCL, PHENYLEPHRINE HCL 5 MILLIGRAM(S): 325; 25; 5 TABLET ORAL at 22:14

## 2025-01-01 RX ADMIN — PHENYLEPHRINE HYDROCHLORIDE 74.6 MICROGRAM(S)/KG/MIN: 10 INJECTION INTRAVENOUS at 04:56

## 2025-01-01 RX ADMIN — ANTISEPTIC SURGICAL SCRUB 1 APPLICATION(S): 0.04 SOLUTION TOPICAL at 05:18

## 2025-01-01 RX ADMIN — NYSTATIN 1 APPLICATION(S): 100000 POWDER TOPICAL at 18:22

## 2025-01-01 RX ADMIN — URSODIOL 300 MILLIGRAM(S): 250 TABLET, FILM COATED ORAL at 17:18

## 2025-01-01 RX ADMIN — PANTOPRAZOLE 40 MILLIGRAM(S): 20 TABLET, DELAYED RELEASE ORAL at 06:32

## 2025-01-01 RX ADMIN — SULFAMETHOXAZOLE AND TRIMETHOPRIM 80 MILLIGRAM(S): 400; 80 TABLET ORAL at 12:45

## 2025-01-01 RX ADMIN — PHENYLEPHRINE HYDROCHLORIDE 3.73 MICROGRAM(S)/KG/MIN: 10 INJECTION INTRAVENOUS at 23:45

## 2025-01-01 RX ADMIN — Medication 100 MILLIGRAM(S): at 05:32

## 2025-01-01 RX ADMIN — Medication 4: at 12:31

## 2025-01-01 RX ADMIN — HYDROMORPHONE HYDROCHLORIDE 0.25 MILLIGRAM(S): 4 INJECTION, SOLUTION INTRAMUSCULAR; INTRAVENOUS; SUBCUTANEOUS at 10:30

## 2025-01-01 RX ADMIN — NYSTATIN 1 APPLICATION(S): 100000 POWDER TOPICAL at 06:03

## 2025-01-01 RX ADMIN — ANTISEPTIC SURGICAL SCRUB 15 MILLILITER(S): 0.04 SOLUTION TOPICAL at 17:15

## 2025-01-01 RX ADMIN — URSODIOL 300 MILLIGRAM(S): 250 TABLET, FILM COATED ORAL at 18:07

## 2025-01-01 RX ADMIN — IMIPENEM AND CILASTATIN 100 MILLIGRAM(S): 250; 250 INJECTION, POWDER, FOR SOLUTION INTRAVENOUS at 03:10

## 2025-01-01 RX ADMIN — CYCLOSPORINE 50 MILLIGRAM(S): 100 CAPSULE, GELATIN COATED ORAL at 20:58

## 2025-01-01 RX ADMIN — URSODIOL 300 MILLIGRAM(S): 250 TABLET, FILM COATED ORAL at 06:34

## 2025-01-01 RX ADMIN — HYDROMORPHONE HYDROCHLORIDE 0.25 MILLIGRAM(S): 4 INJECTION, SOLUTION INTRAMUSCULAR; INTRAVENOUS; SUBCUTANEOUS at 14:06

## 2025-01-01 RX ADMIN — MUPIROCIN 1 APPLICATION(S): 2 CREAM TOPICAL at 17:26

## 2025-01-01 RX ADMIN — ANTISEPTIC SURGICAL SCRUB 15 MILLILITER(S): 0.04 SOLUTION TOPICAL at 17:25

## 2025-01-01 RX ADMIN — GANCICLOVIR 100 MILLIGRAM(S): 250 CAPSULE ORAL at 17:27

## 2025-01-01 RX ADMIN — GANCICLOVIR 100 MILLIGRAM(S): 250 CAPSULE ORAL at 13:09

## 2025-01-01 RX ADMIN — Medication 2: at 11:28

## 2025-01-01 RX ADMIN — Medication 4.5 UNIT(S)/MIN: at 19:46

## 2025-01-01 RX ADMIN — URSODIOL 300 MILLIGRAM(S): 250 TABLET, FILM COATED ORAL at 18:35

## 2025-01-01 RX ADMIN — SULFAMETHOXAZOLE AND TRIMETHOPRIM 318.75 MILLIGRAM(S): 400; 80 TABLET ORAL at 23:00

## 2025-01-01 RX ADMIN — ALBUMIN HUMAN 125 MILLILITER(S): 50 SOLUTION INTRAVENOUS at 18:10

## 2025-01-01 RX ADMIN — MORPHINE 2 MILLIGRAM(S): 10 SOLUTION ORAL at 17:48

## 2025-01-01 RX ADMIN — PANTOPRAZOLE 40 MILLIGRAM(S): 20 TABLET, DELAYED RELEASE ORAL at 17:15

## 2025-01-01 RX ADMIN — Medication 100 MILLIGRAM(S): at 10:40

## 2025-01-01 RX ADMIN — ANTISEPTIC SURGICAL SCRUB 15 MILLILITER(S): 0.04 SOLUTION TOPICAL at 18:00

## 2025-01-01 RX ADMIN — URSODIOL 300 MILLIGRAM(S): 250 TABLET, FILM COATED ORAL at 05:56

## 2025-01-01 RX ADMIN — MIDODRINE HYDROCHLORIDE 10 MILLIGRAM(S): 5 TABLET ORAL at 22:08

## 2025-01-01 RX ADMIN — CASPOFUNGIN ACETATE 250 MILLIGRAM(S): 5 INJECTION, POWDER, LYOPHILIZED, FOR SOLUTION INTRAVENOUS at 14:21

## 2025-01-01 RX ADMIN — ALBUMIN HUMAN 50 MILLILITER(S): 50 SOLUTION INTRAVENOUS at 04:23

## 2025-01-01 RX ADMIN — MIRTAZAPINE 7.5 MILLIGRAM(S): 30 TABLET, FILM COATED ORAL at 21:49

## 2025-01-01 RX ADMIN — HYDROMORPHONE HYDROCHLORIDE 0.25 MILLIGRAM(S): 4 INJECTION, SOLUTION INTRAMUSCULAR; INTRAVENOUS; SUBCUTANEOUS at 08:50

## 2025-01-01 RX ADMIN — Medication 1 APPLICATION(S): at 12:43

## 2025-01-01 RX ADMIN — ANTISEPTIC SURGICAL SCRUB 15 MILLILITER(S): 0.04 SOLUTION TOPICAL at 05:46

## 2025-01-01 RX ADMIN — ANTISEPTIC SURGICAL SCRUB 1 APPLICATION(S): 0.04 SOLUTION TOPICAL at 06:30

## 2025-01-01 RX ADMIN — DIPHENOXYLATE HYDROCHLORIDE AND ATROPINE SULFATE 2 TABLET(S): 2.5; .025 TABLET ORAL at 23:16

## 2025-01-01 RX ADMIN — ALBUMIN HUMAN 50 MILLILITER(S): 50 SOLUTION INTRAVENOUS at 12:03

## 2025-01-01 RX ADMIN — HYDROMORPHONE HYDROCHLORIDE 0.25 MILLIGRAM(S): 4 INJECTION, SOLUTION INTRAMUSCULAR; INTRAVENOUS; SUBCUTANEOUS at 09:17

## 2025-01-01 RX ADMIN — LOPERAMIDE HYDROCHLORIDE 2 MILLIGRAM(S): 2 CAPSULE ORAL at 11:07

## 2025-01-01 RX ADMIN — PHENYLEPHRINE HYDROCHLORIDE 3.73 MICROGRAM(S)/KG/MIN: 10 INJECTION INTRAVENOUS at 19:52

## 2025-01-01 RX ADMIN — Medication 255 MILLIMOLE(S): at 19:24

## 2025-01-01 RX ADMIN — CASPOFUNGIN ACETATE 250 MILLIGRAM(S): 5 INJECTION, POWDER, LYOPHILIZED, FOR SOLUTION INTRAVENOUS at 15:36

## 2025-01-01 RX ADMIN — ALBUMIN HUMAN 100 MILLILITER(S): 50 SOLUTION INTRAVENOUS at 05:25

## 2025-01-01 RX ADMIN — Medication 4: at 23:31

## 2025-01-01 RX ADMIN — ANTISEPTIC SURGICAL SCRUB 1 APPLICATION(S): 0.04 SOLUTION TOPICAL at 05:02

## 2025-01-01 RX ADMIN — SULFAMETHOXAZOLE AND TRIMETHOPRIM 259.38 MILLIGRAM(S): 400; 80 TABLET ORAL at 11:44

## 2025-01-01 RX ADMIN — Medication 4: at 18:26

## 2025-01-01 RX ADMIN — QUETIAPINE FUMARATE 25 MILLIGRAM(S): 300 TABLET ORAL at 21:35

## 2025-01-01 RX ADMIN — URSODIOL 300 MILLIGRAM(S): 250 TABLET, FILM COATED ORAL at 06:49

## 2025-01-01 RX ADMIN — OXYCODONE HYDROCHLORIDE 2.5 MILLIGRAM(S): 30 TABLET ORAL at 23:43

## 2025-01-01 RX ADMIN — MIDODRINE HYDROCHLORIDE 30 MILLIGRAM(S): 5 TABLET ORAL at 21:47

## 2025-01-01 RX ADMIN — ALBUMIN HUMAN 250 MILLILITER(S): 50 SOLUTION INTRAVENOUS at 12:21

## 2025-01-01 RX ADMIN — BUMETANIDE 2 MILLIGRAM(S): 2 TABLET ORAL at 09:57

## 2025-01-01 RX ADMIN — ANTISEPTIC SURGICAL SCRUB 1 APPLICATION(S): 0.04 SOLUTION TOPICAL at 05:49

## 2025-01-01 RX ADMIN — GANCICLOVIR 100 MILLIGRAM(S): 250 CAPSULE ORAL at 04:49

## 2025-01-01 RX ADMIN — MIDODRINE HYDROCHLORIDE 20 MILLIGRAM(S): 5 TABLET ORAL at 21:34

## 2025-01-01 RX ADMIN — PANTOPRAZOLE 40 MILLIGRAM(S): 20 TABLET, DELAYED RELEASE ORAL at 17:25

## 2025-01-01 RX ADMIN — ANTISEPTIC SURGICAL SCRUB 1 APPLICATION(S): 0.04 SOLUTION TOPICAL at 05:55

## 2025-01-01 RX ADMIN — POTASSIUM CHLORIDE 100 MILLIEQUIVALENT(S): 750 TABLET, EXTENDED RELEASE ORAL at 00:48

## 2025-01-01 RX ADMIN — BUDESONIDE 0.5 MILLIGRAM(S): 9 TABLET, EXTENDED RELEASE ORAL at 18:03

## 2025-01-01 RX ADMIN — IMIPENEM AND CILASTATIN 100 MILLIGRAM(S): 250; 250 INJECTION, POWDER, FOR SOLUTION INTRAVENOUS at 04:17

## 2025-01-01 RX ADMIN — ALBUMIN HUMAN 50 MILLILITER(S): 50 SOLUTION INTRAVENOUS at 21:16

## 2025-01-01 RX ADMIN — OXYCODONE HYDROCHLORIDE 5 MILLIGRAM(S): 30 TABLET ORAL at 23:21

## 2025-01-01 RX ADMIN — IMIPENEM AND CILASTATIN 100 MILLIGRAM(S): 250; 250 INJECTION, POWDER, FOR SOLUTION INTRAVENOUS at 21:34

## 2025-01-01 RX ADMIN — CASPOFUNGIN ACETATE 250 MILLIGRAM(S): 5 INJECTION, POWDER, LYOPHILIZED, FOR SOLUTION INTRAVENOUS at 13:16

## 2025-01-01 RX ADMIN — CYCLOSPORINE 75 MILLIGRAM(S): 100 CAPSULE, GELATIN COATED ORAL at 09:42

## 2025-01-01 RX ADMIN — MORPHINE 2 MILLIGRAM(S): 10 SOLUTION ORAL at 17:56

## 2025-01-01 RX ADMIN — PHENYLEPHRINE HYDROCHLORIDE 74.6 MICROGRAM(S)/KG/MIN: 10 INJECTION INTRAVENOUS at 07:22

## 2025-01-01 RX ADMIN — LOPERAMIDE HYDROCHLORIDE 2 MILLIGRAM(S): 2 CAPSULE ORAL at 05:17

## 2025-01-01 RX ADMIN — DIPHENOXYLATE HYDROCHLORIDE AND ATROPINE SULFATE 2 TABLET(S): 2.5; .025 TABLET ORAL at 05:33

## 2025-01-01 RX ADMIN — ALBUMIN HUMAN 50 MILLILITER(S): 50 SOLUTION INTRAVENOUS at 09:08

## 2025-01-01 RX ADMIN — ANTISEPTIC SURGICAL SCRUB 15 MILLILITER(S): 0.04 SOLUTION TOPICAL at 17:01

## 2025-01-01 RX ADMIN — VORICONAZOLE 125 MILLIGRAM(S): 10 INJECTION, POWDER, LYOPHILIZED, FOR SOLUTION INTRAVENOUS at 18:22

## 2025-01-01 RX ADMIN — Medication 8 UNIT(S): at 23:58

## 2025-01-01 RX ADMIN — NYSTATIN 1 APPLICATION(S): 100000 POWDER TOPICAL at 17:11

## 2025-01-01 RX ADMIN — MIDODRINE HYDROCHLORIDE 10 MILLIGRAM(S): 5 TABLET ORAL at 22:17

## 2025-01-01 RX ADMIN — BUDESONIDE 0.5 MILLIGRAM(S): 9 TABLET, EXTENDED RELEASE ORAL at 05:29

## 2025-01-01 RX ADMIN — Medication 4.5 UNIT(S)/MIN: at 20:12

## 2025-01-01 RX ADMIN — GANCICLOVIR 100 MILLIGRAM(S): 250 CAPSULE ORAL at 17:12

## 2025-01-01 RX ADMIN — Medication 32 MILLIGRAM(S): at 11:05

## 2025-01-01 RX ADMIN — GANCICLOVIR 100 MILLIGRAM(S): 250 CAPSULE ORAL at 17:54

## 2025-01-01 RX ADMIN — OXYCODONE HYDROCHLORIDE 5 MILLIGRAM(S): 30 TABLET ORAL at 19:25

## 2025-01-01 RX ADMIN — MIRTAZAPINE 7.5 MILLIGRAM(S): 30 TABLET, FILM COATED ORAL at 01:41

## 2025-01-01 RX ADMIN — Medication 25 MILLILITER(S): at 09:10

## 2025-01-01 RX ADMIN — Medication 32 MILLIGRAM(S): at 11:09

## 2025-01-01 RX ADMIN — HYDROMORPHONE HYDROCHLORIDE 0.25 MILLIGRAM(S): 4 INJECTION, SOLUTION INTRAMUSCULAR; INTRAVENOUS; SUBCUTANEOUS at 08:35

## 2025-01-01 RX ADMIN — Medication 2.5 MILLIGRAM(S): at 00:24

## 2025-01-01 RX ADMIN — Medication 6: at 06:51

## 2025-01-01 RX ADMIN — VORICONAZOLE 125 MILLIGRAM(S): 10 INJECTION, POWDER, LYOPHILIZED, FOR SOLUTION INTRAVENOUS at 19:05

## 2025-01-01 RX ADMIN — ALBUMIN HUMAN 50 MILLILITER(S): 50 SOLUTION INTRAVENOUS at 17:47

## 2025-01-01 RX ADMIN — ANTISEPTIC SURGICAL SCRUB 1 APPLICATION(S): 0.04 SOLUTION TOPICAL at 06:34

## 2025-01-01 RX ADMIN — ALBUMIN HUMAN 50 MILLILITER(S): 50 SOLUTION INTRAVENOUS at 17:20

## 2025-01-01 RX ADMIN — Medication 16 MILLIGRAM(S): at 05:01

## 2025-01-01 RX ADMIN — Medication 6 UNIT(S): at 17:41

## 2025-01-01 RX ADMIN — Medication 4.5 UNIT(S)/MIN: at 07:37

## 2025-01-01 RX ADMIN — VORICONAZOLE 125 MILLIGRAM(S): 10 INJECTION, POWDER, LYOPHILIZED, FOR SOLUTION INTRAVENOUS at 07:29

## 2025-01-01 RX ADMIN — HYDROMORPHONE HYDROCHLORIDE 0.25 MILLIGRAM(S): 4 INJECTION, SOLUTION INTRAMUSCULAR; INTRAVENOUS; SUBCUTANEOUS at 11:24

## 2025-01-01 RX ADMIN — ANTISEPTIC SURGICAL SCRUB 15 MILLILITER(S): 0.04 SOLUTION TOPICAL at 05:08

## 2025-01-01 RX ADMIN — OXYCODONE HYDROCHLORIDE 2.5 MILLIGRAM(S): 30 TABLET ORAL at 23:00

## 2025-01-01 RX ADMIN — BUDESONIDE 0.5 MILLIGRAM(S): 9 TABLET, EXTENDED RELEASE ORAL at 05:19

## 2025-01-01 RX ADMIN — Medication 4.5 UNIT(S)/MIN: at 07:31

## 2025-01-01 RX ADMIN — ALBUMIN HUMAN 50 MILLILITER(S): 50 SOLUTION INTRAVENOUS at 05:03

## 2025-01-01 RX ADMIN — Medication 100 MILLIGRAM(S): at 22:08

## 2025-01-01 RX ADMIN — ANTISEPTIC SURGICAL SCRUB 15 MILLILITER(S): 0.04 SOLUTION TOPICAL at 05:45

## 2025-01-01 RX ADMIN — BUDESONIDE 0.5 MILLIGRAM(S): 9 TABLET, EXTENDED RELEASE ORAL at 05:03

## 2025-01-01 RX ADMIN — PANTOPRAZOLE 40 MILLIGRAM(S): 20 TABLET, DELAYED RELEASE ORAL at 18:02

## 2025-01-01 RX ADMIN — SULFAMETHOXAZOLE AND TRIMETHOPRIM 268.75 MILLIGRAM(S): 400; 80 TABLET ORAL at 18:03

## 2025-01-01 RX ADMIN — ALBUMIN HUMAN 125 MILLILITER(S): 50 SOLUTION INTRAVENOUS at 13:10

## 2025-01-01 RX ADMIN — ANTISEPTIC SURGICAL SCRUB 15 MILLILITER(S): 0.04 SOLUTION TOPICAL at 05:56

## 2025-01-01 RX ADMIN — Medication 4: at 01:58

## 2025-01-01 RX ADMIN — NYSTATIN 1 APPLICATION(S): 100000 POWDER TOPICAL at 17:01

## 2025-01-01 RX ADMIN — LOPERAMIDE HYDROCHLORIDE 2 MILLIGRAM(S): 2 CAPSULE ORAL at 11:09

## 2025-01-01 RX ADMIN — ALBUMIN HUMAN 250 MILLILITER(S): 50 SOLUTION INTRAVENOUS at 18:06

## 2025-01-01 RX ADMIN — HYDROMORPHONE HYDROCHLORIDE 0.25 MILLIGRAM(S): 4 INJECTION, SOLUTION INTRAMUSCULAR; INTRAVENOUS; SUBCUTANEOUS at 22:00

## 2025-01-01 RX ADMIN — VORICONAZOLE 125 MILLIGRAM(S): 10 INJECTION, POWDER, LYOPHILIZED, FOR SOLUTION INTRAVENOUS at 20:40

## 2025-01-01 RX ADMIN — ANTISEPTIC SURGICAL SCRUB 15 MILLILITER(S): 0.04 SOLUTION TOPICAL at 05:15

## 2025-01-01 RX ADMIN — HYDROMORPHONE HYDROCHLORIDE 0.25 MILLIGRAM(S): 4 INJECTION, SOLUTION INTRAMUSCULAR; INTRAVENOUS; SUBCUTANEOUS at 19:38

## 2025-01-01 RX ADMIN — NYSTATIN 1 APPLICATION(S): 100000 POWDER TOPICAL at 17:15

## 2025-01-01 RX ADMIN — MUPIROCIN 1 APPLICATION(S): 2 CREAM TOPICAL at 05:02

## 2025-01-01 RX ADMIN — IMIPENEM AND CILASTATIN 100 MILLIGRAM(S): 250; 250 INJECTION, POWDER, FOR SOLUTION INTRAVENOUS at 09:34

## 2025-01-01 RX ADMIN — Medication 6 UNIT(S): at 00:07

## 2025-01-01 RX ADMIN — Medication 100 MILLIGRAM(S): at 17:23

## 2025-01-01 RX ADMIN — Medication 10: at 17:41

## 2025-01-01 RX ADMIN — Medication 4.5 UNIT(S)/MIN: at 07:30

## 2025-01-01 RX ADMIN — NYSTATIN 1 APPLICATION(S): 100000 POWDER TOPICAL at 17:08

## 2025-01-01 RX ADMIN — NYSTATIN 1 APPLICATION(S): 100000 POWDER TOPICAL at 05:08

## 2025-01-01 RX ADMIN — HYDROMORPHONE HYDROCHLORIDE 0.5 MILLIGRAM(S): 4 INJECTION, SOLUTION INTRAMUSCULAR; INTRAVENOUS; SUBCUTANEOUS at 05:00

## 2025-01-01 RX ADMIN — NYSTATIN 1 APPLICATION(S): 100000 POWDER TOPICAL at 17:05

## 2025-01-01 RX ADMIN — ANTISEPTIC SURGICAL SCRUB 15 MILLILITER(S): 0.04 SOLUTION TOPICAL at 17:00

## 2025-01-01 RX ADMIN — IMIPENEM AND CILASTATIN 100 MILLIGRAM(S): 250; 250 INJECTION, POWDER, FOR SOLUTION INTRAVENOUS at 12:04

## 2025-01-01 RX ADMIN — NYSTATIN 1 APPLICATION(S): 100000 POWDER TOPICAL at 05:15

## 2025-01-01 RX ADMIN — PHENYLEPHRINE HYDROCHLORIDE 7.46 MICROGRAM(S)/KG/MIN: 10 INJECTION INTRAVENOUS at 21:38

## 2025-01-01 RX ADMIN — IMIPENEM AND CILASTATIN 100 MILLIGRAM(S): 250; 250 INJECTION, POWDER, FOR SOLUTION INTRAVENOUS at 09:42

## 2025-01-01 RX ADMIN — DIPHENOXYLATE HYDROCHLORIDE AND ATROPINE SULFATE 2 TABLET(S): 2.5; .025 TABLET ORAL at 05:46

## 2025-01-01 RX ADMIN — FILGRASTIM-SNDZ 480 MICROGRAM(S): 300 INJECTION, SOLUTION INTRAVENOUS; SUBCUTANEOUS at 09:54

## 2025-01-01 RX ADMIN — NYSTATIN 1 APPLICATION(S): 100000 POWDER TOPICAL at 17:25

## 2025-01-01 RX ADMIN — Medication 4.5 UNIT(S)/MIN: at 07:43

## 2025-01-01 RX ADMIN — Medication 100 MILLIGRAM(S): at 13:12

## 2025-01-01 RX ADMIN — Medication 200 GRAM(S): at 14:21

## 2025-01-01 RX ADMIN — Medication 4 UNIT(S): at 06:05

## 2025-01-01 RX ADMIN — IMIPENEM AND CILASTATIN 100 MILLIGRAM(S): 250; 250 INJECTION, POWDER, FOR SOLUTION INTRAVENOUS at 04:06

## 2025-01-01 RX ADMIN — NYSTATIN 1 APPLICATION(S): 100000 POWDER TOPICAL at 05:01

## 2025-01-01 RX ADMIN — SULFAMETHOXAZOLE AND TRIMETHOPRIM 268.75 MILLIGRAM(S): 400; 80 TABLET ORAL at 18:53

## 2025-01-01 RX ADMIN — ALBUMIN HUMAN 50 MILLILITER(S): 50 SOLUTION INTRAVENOUS at 13:03

## 2025-01-01 RX ADMIN — URSODIOL 300 MILLIGRAM(S): 250 TABLET, FILM COATED ORAL at 05:47

## 2025-01-01 RX ADMIN — ALBUMIN HUMAN 250 MILLILITER(S): 50 SOLUTION INTRAVENOUS at 06:52

## 2025-01-01 RX ADMIN — PHENYLEPHRINE HYDROCHLORIDE 3.73 MICROGRAM(S)/KG/MIN: 10 INJECTION INTRAVENOUS at 07:33

## 2025-01-01 RX ADMIN — Medication 16 MILLIGRAM(S): at 05:39

## 2025-01-01 RX ADMIN — Medication 4 UNIT(S): at 05:56

## 2025-01-01 RX ADMIN — PHENYLEPHRINE HYDROCHLORIDE 7.46 MICROGRAM(S)/KG/MIN: 10 INJECTION INTRAVENOUS at 19:44

## 2025-01-01 RX ADMIN — VORICONAZOLE 125 MILLIGRAM(S): 10 INJECTION, POWDER, LYOPHILIZED, FOR SOLUTION INTRAVENOUS at 21:19

## 2025-01-01 RX ADMIN — ANTISEPTIC SURGICAL SCRUB 1 APPLICATION(S): 0.04 SOLUTION TOPICAL at 05:15

## 2025-01-01 RX ADMIN — Medication 5 MILLIGRAM(S): at 11:04

## 2025-01-01 RX ADMIN — Medication 6 UNIT(S): at 06:19

## 2025-01-01 RX ADMIN — MIDODRINE HYDROCHLORIDE 30 MILLIGRAM(S): 5 TABLET ORAL at 22:13

## 2025-01-01 RX ADMIN — BUMETANIDE 124 MILLIGRAM(S): 2 TABLET ORAL at 15:58

## 2025-01-01 RX ADMIN — ALBUMIN HUMAN 500 MILLILITER(S): 50 SOLUTION INTRAVENOUS at 23:48

## 2025-01-01 RX ADMIN — Medication 1 APPLICATION(S): at 12:31

## 2025-01-01 RX ADMIN — Medication 6 UNIT(S): at 18:25

## 2025-01-01 RX ADMIN — ALBUMIN HUMAN 250 MILLILITER(S): 50 SOLUTION INTRAVENOUS at 00:02

## 2025-01-01 RX ADMIN — ACETAMINOPHEN, DIPHENHYDRAMINE HCL, PHENYLEPHRINE HCL 5 MILLIGRAM(S): 325; 25; 5 TABLET ORAL at 21:06

## 2025-01-01 RX ADMIN — MIDODRINE HYDROCHLORIDE 30 MILLIGRAM(S): 5 TABLET ORAL at 05:07

## 2025-01-01 RX ADMIN — ALBUMIN HUMAN 100 MILLILITER(S): 50 SOLUTION INTRAVENOUS at 22:48

## 2025-01-01 RX ADMIN — ALBUMIN HUMAN 100 MILLILITER(S): 50 SOLUTION INTRAVENOUS at 16:27

## 2025-01-01 RX ADMIN — IMIPENEM AND CILASTATIN 100 MILLIGRAM(S): 250; 250 INJECTION, POWDER, FOR SOLUTION INTRAVENOUS at 10:28

## 2025-01-01 RX ADMIN — DIPHENOXYLATE HYDROCHLORIDE AND ATROPINE SULFATE 2 TABLET(S): 2.5; .025 TABLET ORAL at 23:56

## 2025-01-01 RX ADMIN — PHENYLEPHRINE HYDROCHLORIDE 3.73 MICROGRAM(S)/KG/MIN: 10 INJECTION INTRAVENOUS at 07:16

## 2025-01-01 RX ADMIN — PANTOPRAZOLE 40 MILLIGRAM(S): 20 TABLET, DELAYED RELEASE ORAL at 06:04

## 2025-01-01 RX ADMIN — Medication 100 MILLIGRAM(S): at 07:13

## 2025-01-01 RX ADMIN — SULFAMETHOXAZOLE AND TRIMETHOPRIM 80 MILLIGRAM(S): 400; 80 TABLET ORAL at 11:58

## 2025-01-01 RX ADMIN — IMIPENEM AND CILASTATIN 100 MILLIGRAM(S): 250; 250 INJECTION, POWDER, FOR SOLUTION INTRAVENOUS at 10:16

## 2025-01-01 RX ADMIN — ALBUMIN HUMAN 1000 MILLILITER(S): 50 SOLUTION INTRAVENOUS at 21:46

## 2025-01-01 RX ADMIN — MIDODRINE HYDROCHLORIDE 20 MILLIGRAM(S): 5 TABLET ORAL at 05:13

## 2025-01-01 RX ADMIN — PANTOPRAZOLE 40 MILLIGRAM(S): 20 TABLET, DELAYED RELEASE ORAL at 17:12

## 2025-01-01 RX ADMIN — MUPIROCIN 1 APPLICATION(S): 2 CREAM TOPICAL at 18:03

## 2025-01-01 RX ADMIN — ANTISEPTIC SURGICAL SCRUB 1 APPLICATION(S): 0.04 SOLUTION TOPICAL at 06:47

## 2025-01-01 RX ADMIN — Medication 2.5 MILLIGRAM(S): at 05:02

## 2025-01-01 RX ADMIN — IMIPENEM AND CILASTATIN 100 MILLIGRAM(S): 250; 250 INJECTION, POWDER, FOR SOLUTION INTRAVENOUS at 18:03

## 2025-01-01 RX ADMIN — LOPERAMIDE HYDROCHLORIDE 2 MILLIGRAM(S): 2 CAPSULE ORAL at 11:19

## 2025-01-01 RX ADMIN — EPOETIN ALFA 20000 UNIT(S): 2000 SOLUTION INTRAVENOUS; SUBCUTANEOUS at 12:13

## 2025-01-01 RX ADMIN — URSODIOL 300 MILLIGRAM(S): 250 TABLET, FILM COATED ORAL at 18:41

## 2025-01-01 RX ADMIN — FILGRASTIM-SNDZ 300 MICROGRAM(S): 300 INJECTION, SOLUTION INTRAVENOUS; SUBCUTANEOUS at 04:17

## 2025-01-01 RX ADMIN — Medication 100 MILLIGRAM(S): at 17:57

## 2025-01-01 RX ADMIN — Medication 2.5 MILLIGRAM(S): at 12:31

## 2025-01-01 RX ADMIN — IMIPENEM AND CILASTATIN 100 MILLIGRAM(S): 250; 250 INJECTION, POWDER, FOR SOLUTION INTRAVENOUS at 21:05

## 2025-01-01 RX ADMIN — Medication 5 MILLIGRAM(S): at 05:55

## 2025-01-01 RX ADMIN — Medication 4 UNIT(S): at 23:17

## 2025-01-01 RX ADMIN — ACETAMINOPHEN, DIPHENHYDRAMINE HCL, PHENYLEPHRINE HCL 5 MILLIGRAM(S): 325; 25; 5 TABLET ORAL at 21:47

## 2025-01-01 RX ADMIN — Medication 3 UNIT(S): at 00:12

## 2025-01-01 RX ADMIN — Medication 32 MILLIGRAM(S): at 12:14

## 2025-01-01 RX ADMIN — PANTOPRAZOLE 40 MILLIGRAM(S): 20 TABLET, DELAYED RELEASE ORAL at 05:07

## 2025-01-01 RX ADMIN — Medication 200 GRAM(S): at 13:52

## 2025-01-01 RX ADMIN — Medication 25 GRAM(S): at 00:57

## 2025-01-01 RX ADMIN — OXYCODONE HYDROCHLORIDE 5 MILLIGRAM(S): 30 TABLET ORAL at 20:00

## 2025-01-01 RX ADMIN — HYDROMORPHONE HYDROCHLORIDE 0.25 MILLIGRAM(S): 4 INJECTION, SOLUTION INTRAMUSCULAR; INTRAVENOUS; SUBCUTANEOUS at 03:00

## 2025-01-01 RX ADMIN — DIPHENOXYLATE HYDROCHLORIDE AND ATROPINE SULFATE 2 TABLET(S): 2.5; .025 TABLET ORAL at 11:56

## 2025-01-01 RX ADMIN — Medication 2.5 MILLIGRAM(S): at 23:50

## 2025-01-01 RX ADMIN — VORICONAZOLE 125 MILLIGRAM(S): 10 INJECTION, POWDER, LYOPHILIZED, FOR SOLUTION INTRAVENOUS at 06:04

## 2025-01-01 RX ADMIN — ANTISEPTIC SURGICAL SCRUB 15 MILLILITER(S): 0.04 SOLUTION TOPICAL at 05:48

## 2025-01-01 RX ADMIN — ACETAMINOPHEN, DIPHENHYDRAMINE HCL, PHENYLEPHRINE HCL 5 MILLIGRAM(S): 325; 25; 5 TABLET ORAL at 21:31

## 2025-01-01 RX ADMIN — Medication 4 UNIT(S): at 18:00

## 2025-01-01 RX ADMIN — Medication 4: at 06:50

## 2025-01-01 RX ADMIN — PHENYLEPHRINE HYDROCHLORIDE 74.6 MICROGRAM(S)/KG/MIN: 10 INJECTION INTRAVENOUS at 20:12

## 2025-01-01 RX ADMIN — Medication 4.5 UNIT(S)/MIN: at 14:04

## 2025-01-01 RX ADMIN — SULFAMETHOXAZOLE AND TRIMETHOPRIM 80 MILLIGRAM(S): 400; 80 TABLET ORAL at 12:15

## 2025-01-01 RX ADMIN — DIPHENHYDRAMINE HYDROCHLORIDE AND LIDOCAINE HYDROCHLORIDE AND ALUMINUM HYDROXIDE AND MAGNESIUM HYDRO 10 MILLILITER(S): KIT at 15:15

## 2025-01-01 RX ADMIN — ALBUMIN HUMAN 500 MILLILITER(S): 50 SOLUTION INTRAVENOUS at 17:18

## 2025-01-01 RX ADMIN — LOPERAMIDE HYDROCHLORIDE 2 MILLIGRAM(S): 2 CAPSULE ORAL at 17:04

## 2025-01-01 RX ADMIN — BUDESONIDE 0.5 MILLIGRAM(S): 9 TABLET, EXTENDED RELEASE ORAL at 06:17

## 2025-01-01 RX ADMIN — DIPHENOXYLATE HYDROCHLORIDE AND ATROPINE SULFATE 2 TABLET(S): 2.5; .025 TABLET ORAL at 17:04

## 2025-01-01 RX ADMIN — Medication 16 MILLIGRAM(S): at 05:14

## 2025-01-01 RX ADMIN — SULFAMETHOXAZOLE AND TRIMETHOPRIM 259.38 MILLIGRAM(S): 400; 80 TABLET ORAL at 12:30

## 2025-01-01 RX ADMIN — MUPIROCIN 1 APPLICATION(S): 2 CREAM TOPICAL at 05:07

## 2025-01-01 RX ADMIN — DIPHENOXYLATE HYDROCHLORIDE AND ATROPINE SULFATE 2 TABLET(S): 2.5; .025 TABLET ORAL at 18:02

## 2025-01-01 RX ADMIN — ALBUMIN HUMAN 50 MILLILITER(S): 50 SOLUTION INTRAVENOUS at 11:32

## 2025-01-01 RX ADMIN — Medication 4 UNIT(S): at 21:10

## 2025-01-01 RX ADMIN — GANCICLOVIR 100 MILLIGRAM(S): 250 CAPSULE ORAL at 05:45

## 2025-01-01 RX ADMIN — LOPERAMIDE HYDROCHLORIDE 4 MILLIGRAM(S): 2 CAPSULE ORAL at 18:47

## 2025-01-01 RX ADMIN — Medication 6: at 18:22

## 2025-01-01 RX ADMIN — BUDESONIDE 0.5 MILLIGRAM(S): 9 TABLET, EXTENDED RELEASE ORAL at 05:12

## 2025-01-01 RX ADMIN — IMIPENEM AND CILASTATIN 100 MILLIGRAM(S): 250; 250 INJECTION, POWDER, FOR SOLUTION INTRAVENOUS at 21:11

## 2025-01-01 RX ADMIN — MIDODRINE HYDROCHLORIDE 10 MILLIGRAM(S): 5 TABLET ORAL at 13:10

## 2025-01-01 RX ADMIN — HYDROMORPHONE HYDROCHLORIDE 0.25 MILLIGRAM(S): 4 INJECTION, SOLUTION INTRAMUSCULAR; INTRAVENOUS; SUBCUTANEOUS at 04:45

## 2025-01-01 RX ADMIN — OXYCODONE HYDROCHLORIDE 5 MILLIGRAM(S): 30 TABLET ORAL at 19:54

## 2025-01-01 RX ADMIN — Medication 100 MILLIGRAM(S): at 14:20

## 2025-01-01 RX ADMIN — BUDESONIDE 0.5 MILLIGRAM(S): 9 TABLET, EXTENDED RELEASE ORAL at 17:20

## 2025-01-01 RX ADMIN — Medication 2.5 MILLIGRAM(S): at 11:02

## 2025-01-01 RX ADMIN — ALBUMIN HUMAN 50 MILLILITER(S): 50 SOLUTION INTRAVENOUS at 12:21

## 2025-01-01 RX ADMIN — Medication 2.5 MILLIGRAM(S): at 23:06

## 2025-01-01 RX ADMIN — MIDODRINE HYDROCHLORIDE 30 MILLIGRAM(S): 5 TABLET ORAL at 21:50

## 2025-01-01 RX ADMIN — MIDODRINE HYDROCHLORIDE 10 MILLIGRAM(S): 5 TABLET ORAL at 22:09

## 2025-01-01 RX ADMIN — Medication 2: at 23:08

## 2025-01-01 RX ADMIN — Medication 4: at 17:54

## 2025-01-01 RX ADMIN — Medication 2: at 23:58

## 2025-01-01 RX ADMIN — MUPIROCIN 1 APPLICATION(S): 2 CREAM TOPICAL at 17:27

## 2025-01-01 RX ADMIN — LOPERAMIDE HYDROCHLORIDE 2 MILLIGRAM(S): 2 CAPSULE ORAL at 17:12

## 2025-01-01 RX ADMIN — SULFAMETHOXAZOLE AND TRIMETHOPRIM 268.75 MILLIGRAM(S): 400; 80 TABLET ORAL at 19:13

## 2025-01-01 RX ADMIN — CYCLOSPORINE 50 MILLIGRAM(S): 100 CAPSULE, GELATIN COATED ORAL at 20:39

## 2025-01-01 RX ADMIN — Medication 2.5 MILLIGRAM(S): at 05:06

## 2025-01-01 RX ADMIN — Medication 100 MILLIGRAM(S): at 22:09

## 2025-01-01 RX ADMIN — ANTISEPTIC SURGICAL SCRUB 15 MILLILITER(S): 0.04 SOLUTION TOPICAL at 17:07

## 2025-01-01 RX ADMIN — Medication 100 MILLIGRAM(S): at 06:59

## 2025-01-01 RX ADMIN — VORICONAZOLE 125 MILLIGRAM(S): 10 INJECTION, POWDER, LYOPHILIZED, FOR SOLUTION INTRAVENOUS at 07:57

## 2025-01-01 RX ADMIN — PHENYLEPHRINE HYDROCHLORIDE 74.6 MICROGRAM(S)/KG/MIN: 10 INJECTION INTRAVENOUS at 14:49

## 2025-01-01 RX ADMIN — IMIPENEM AND CILASTATIN 100 MILLIGRAM(S): 250; 250 INJECTION, POWDER, FOR SOLUTION INTRAVENOUS at 03:20

## 2025-01-01 RX ADMIN — Medication 2: at 12:04

## 2025-01-01 RX ADMIN — CASPOFUNGIN ACETATE 250 MILLIGRAM(S): 5 INJECTION, POWDER, LYOPHILIZED, FOR SOLUTION INTRAVENOUS at 13:36

## 2025-01-01 RX ADMIN — MIRTAZAPINE 7.5 MILLIGRAM(S): 30 TABLET, FILM COATED ORAL at 23:49

## 2025-01-01 RX ADMIN — Medication 16 MILLIGRAM(S): at 05:04

## 2025-01-01 RX ADMIN — IMIPENEM AND CILASTATIN 100 MILLIGRAM(S): 250; 250 INJECTION, POWDER, FOR SOLUTION INTRAVENOUS at 04:58

## 2025-01-01 RX ADMIN — Medication 4: at 22:16

## 2025-01-01 RX ADMIN — ANTISEPTIC SURGICAL SCRUB 1 APPLICATION(S): 0.04 SOLUTION TOPICAL at 05:45

## 2025-01-01 RX ADMIN — GANCICLOVIR 100 MILLIGRAM(S): 250 CAPSULE ORAL at 17:00

## 2025-01-01 RX ADMIN — HYDROMORPHONE HYDROCHLORIDE 0.25 MILLIGRAM(S): 4 INJECTION, SOLUTION INTRAMUSCULAR; INTRAVENOUS; SUBCUTANEOUS at 10:45

## 2025-01-01 RX ADMIN — IMIPENEM AND CILASTATIN 100 MILLIGRAM(S): 250; 250 INJECTION, POWDER, FOR SOLUTION INTRAVENOUS at 21:30

## 2025-01-01 RX ADMIN — MUPIROCIN 1 APPLICATION(S): 2 CREAM TOPICAL at 06:17

## 2025-01-01 RX ADMIN — OXYCODONE HYDROCHLORIDE 5 MILLIGRAM(S): 30 TABLET ORAL at 02:45

## 2025-01-01 RX ADMIN — MUPIROCIN 1 APPLICATION(S): 2 CREAM TOPICAL at 18:17

## 2025-01-01 RX ADMIN — MIDODRINE HYDROCHLORIDE 10 MILLIGRAM(S): 5 TABLET ORAL at 13:06

## 2025-01-01 RX ADMIN — Medication 8 MILLIGRAM(S): at 05:17

## 2025-01-01 RX ADMIN — OXYCODONE HYDROCHLORIDE 2.5 MILLIGRAM(S): 30 TABLET ORAL at 23:58

## 2025-01-01 RX ADMIN — MIDODRINE HYDROCHLORIDE 30 MILLIGRAM(S): 5 TABLET ORAL at 05:16

## 2025-01-01 RX ADMIN — SULFAMETHOXAZOLE AND TRIMETHOPRIM 80 MILLIGRAM(S): 400; 80 TABLET ORAL at 12:27

## 2025-01-01 RX ADMIN — Medication 5 MILLIGRAM(S): at 00:05

## 2025-01-01 RX ADMIN — Medication 100 MILLIGRAM(S): at 22:26

## 2025-01-01 RX ADMIN — FILGRASTIM-SNDZ 480 MICROGRAM(S): 300 INJECTION, SOLUTION INTRAVENOUS; SUBCUTANEOUS at 17:19

## 2025-01-01 RX ADMIN — HYDROMORPHONE HYDROCHLORIDE 0.25 MILLIGRAM(S): 4 INJECTION, SOLUTION INTRAMUSCULAR; INTRAVENOUS; SUBCUTANEOUS at 03:15

## 2025-01-01 RX ADMIN — SULFAMETHOXAZOLE AND TRIMETHOPRIM 80 MILLIGRAM(S): 400; 80 TABLET ORAL at 11:57

## 2025-01-01 RX ADMIN — HYDROMORPHONE HYDROCHLORIDE 0.25 MILLIGRAM(S): 4 INJECTION, SOLUTION INTRAMUSCULAR; INTRAVENOUS; SUBCUTANEOUS at 09:58

## 2025-01-01 RX ADMIN — SULFAMETHOXAZOLE AND TRIMETHOPRIM 268.75 MILLIGRAM(S): 400; 80 TABLET ORAL at 22:48

## 2025-01-01 RX ADMIN — Medication 8 UNIT(S): at 18:27

## 2025-01-01 RX ADMIN — Medication 5 MILLIGRAM(S): at 12:21

## 2025-01-01 RX ADMIN — SULFAMETHOXAZOLE AND TRIMETHOPRIM 318.75 MILLIGRAM(S): 400; 80 TABLET ORAL at 14:21

## 2025-01-01 RX ADMIN — IMIPENEM AND CILASTATIN 100 MILLIGRAM(S): 250; 250 INJECTION, POWDER, FOR SOLUTION INTRAVENOUS at 22:47

## 2025-01-01 RX ADMIN — ANTISEPTIC SURGICAL SCRUB 15 MILLILITER(S): 0.04 SOLUTION TOPICAL at 18:13

## 2025-01-01 RX ADMIN — HYDROMORPHONE HYDROCHLORIDE 0.25 MILLIGRAM(S): 4 INJECTION, SOLUTION INTRAMUSCULAR; INTRAVENOUS; SUBCUTANEOUS at 16:34

## 2025-01-01 RX ADMIN — SULFAMETHOXAZOLE AND TRIMETHOPRIM 80 MILLIGRAM(S): 400; 80 TABLET ORAL at 11:19

## 2025-01-01 RX ADMIN — Medication 5 MILLIGRAM(S): at 01:24

## 2025-01-01 RX ADMIN — Medication 4 MILLILITER(S): at 18:24

## 2025-01-01 RX ADMIN — OXYCODONE HYDROCHLORIDE 2.5 MILLIGRAM(S): 30 TABLET ORAL at 00:28

## 2025-01-01 RX ADMIN — ANTISEPTIC SURGICAL SCRUB 1 APPLICATION(S): 0.04 SOLUTION TOPICAL at 05:04

## 2025-01-01 RX ADMIN — MIDODRINE HYDROCHLORIDE 30 MILLIGRAM(S): 5 TABLET ORAL at 05:04

## 2025-01-01 RX ADMIN — Medication 5 MILLIGRAM(S): at 11:43

## 2025-01-01 RX ADMIN — PHENYLEPHRINE HYDROCHLORIDE 7.46 MICROGRAM(S)/KG/MIN: 10 INJECTION INTRAVENOUS at 08:12

## 2025-01-01 RX ADMIN — PHENYLEPHRINE HYDROCHLORIDE 74.6 MICROGRAM(S)/KG/MIN: 10 INJECTION INTRAVENOUS at 23:03

## 2025-01-01 RX ADMIN — MIDODRINE HYDROCHLORIDE 30 MILLIGRAM(S): 5 TABLET ORAL at 21:58

## 2025-01-01 RX ADMIN — DIPHENOXYLATE HYDROCHLORIDE AND ATROPINE SULFATE 2 TABLET(S): 2.5; .025 TABLET ORAL at 17:20

## 2025-01-01 RX ADMIN — SULFAMETHOXAZOLE AND TRIMETHOPRIM 318.75 MILLIGRAM(S): 400; 80 TABLET ORAL at 08:04

## 2025-01-01 RX ADMIN — Medication 100 MILLIGRAM(S): at 05:13

## 2025-01-01 RX ADMIN — Medication 100 MILLIGRAM(S): at 17:43

## 2025-01-01 RX ADMIN — ALBUMIN HUMAN 100 MILLILITER(S): 50 SOLUTION INTRAVENOUS at 04:30

## 2025-01-01 RX ADMIN — HYDROMORPHONE HYDROCHLORIDE 0.25 MILLIGRAM(S): 4 INJECTION, SOLUTION INTRAMUSCULAR; INTRAVENOUS; SUBCUTANEOUS at 06:45

## 2025-01-01 RX ADMIN — ALBUMIN HUMAN 500 MILLILITER(S): 50 SOLUTION INTRAVENOUS at 15:46

## 2025-01-01 RX ADMIN — Medication 4.5 UNIT(S)/MIN: at 19:13

## 2025-01-01 RX ADMIN — DIPHENOXYLATE HYDROCHLORIDE AND ATROPINE SULFATE 2 TABLET(S): 2.5; .025 TABLET ORAL at 17:56

## 2025-01-01 RX ADMIN — OXYCODONE HYDROCHLORIDE 5 MILLIGRAM(S): 30 TABLET ORAL at 03:00

## 2025-01-01 RX ADMIN — CASPOFUNGIN ACETATE 250 MILLIGRAM(S): 5 INJECTION, POWDER, LYOPHILIZED, FOR SOLUTION INTRAVENOUS at 14:57

## 2025-01-01 RX ADMIN — ALBUMIN HUMAN 500 MILLILITER(S): 50 SOLUTION INTRAVENOUS at 06:28

## 2025-01-01 RX ADMIN — PHENYLEPHRINE HYDROCHLORIDE 7.46 MICROGRAM(S)/KG/MIN: 10 INJECTION INTRAVENOUS at 01:33

## 2025-01-01 RX ADMIN — Medication 4: at 12:18

## 2025-01-01 RX ADMIN — SULFAMETHOXAZOLE AND TRIMETHOPRIM 259.38 MILLIGRAM(S): 400; 80 TABLET ORAL at 10:34

## 2025-01-01 RX ADMIN — HYDROMORPHONE HYDROCHLORIDE 0.25 MILLIGRAM(S): 4 INJECTION, SOLUTION INTRAMUSCULAR; INTRAVENOUS; SUBCUTANEOUS at 16:17

## 2025-01-01 RX ADMIN — LOPERAMIDE HYDROCHLORIDE 2 MILLIGRAM(S): 2 CAPSULE ORAL at 05:47

## 2025-01-01 RX ADMIN — ACETAMINOPHEN, DIPHENHYDRAMINE HCL, PHENYLEPHRINE HCL 5 MILLIGRAM(S): 325; 25; 5 TABLET ORAL at 21:59

## 2025-01-01 RX ADMIN — MUPIROCIN 1 APPLICATION(S): 2 CREAM TOPICAL at 05:14

## 2025-01-01 RX ADMIN — Medication 4: at 13:57

## 2025-01-01 RX ADMIN — ALBUMIN HUMAN 50 MILLILITER(S): 50 SOLUTION INTRAVENOUS at 17:11

## 2025-01-01 RX ADMIN — LOPERAMIDE HYDROCHLORIDE 4 MILLIGRAM(S): 2 CAPSULE ORAL at 23:56

## 2025-01-01 RX ADMIN — IMIPENEM AND CILASTATIN 100 MILLIGRAM(S): 250; 250 INJECTION, POWDER, FOR SOLUTION INTRAVENOUS at 04:16

## 2025-01-01 RX ADMIN — PHENYLEPHRINE HYDROCHLORIDE 74.6 MICROGRAM(S)/KG/MIN: 10 INJECTION INTRAVENOUS at 23:07

## 2025-01-01 RX ADMIN — ANTISEPTIC SURGICAL SCRUB 15 MILLILITER(S): 0.04 SOLUTION TOPICAL at 06:03

## 2025-01-01 RX ADMIN — Medication 200 GRAM(S): at 13:06

## 2025-01-01 RX ADMIN — IPRATROPIUM BROMIDE AND ALBUTEROL SULFATE 3 MILLILITER(S): .5; 2.5 SOLUTION RESPIRATORY (INHALATION) at 05:27

## 2025-01-01 RX ADMIN — OXYCODONE HYDROCHLORIDE 5 MILLIGRAM(S): 30 TABLET ORAL at 03:20

## 2025-01-01 RX ADMIN — BUDESONIDE 0.5 MILLIGRAM(S): 9 TABLET, EXTENDED RELEASE ORAL at 05:35

## 2025-01-01 RX ADMIN — PHYTONADIONE 5 MILLIGRAM(S): 2 INJECTION, EMULSION INTRAMUSCULAR; INTRAVENOUS; SUBCUTANEOUS at 09:45

## 2025-01-01 RX ADMIN — HYDROMORPHONE HYDROCHLORIDE 0.25 MILLIGRAM(S): 4 INJECTION, SOLUTION INTRAMUSCULAR; INTRAVENOUS; SUBCUTANEOUS at 11:09

## 2025-01-01 RX ADMIN — IMIPENEM AND CILASTATIN 100 MILLIGRAM(S): 250; 250 INJECTION, POWDER, FOR SOLUTION INTRAVENOUS at 22:02

## 2025-01-01 RX ADMIN — DIPHENHYDRAMINE HYDROCHLORIDE AND LIDOCAINE HYDROCHLORIDE AND ALUMINUM HYDROXIDE AND MAGNESIUM HYDRO 10 MILLILITER(S): KIT at 04:52

## 2025-01-01 RX ADMIN — GANCICLOVIR 100 MILLIGRAM(S): 250 CAPSULE ORAL at 17:01

## 2025-01-01 RX ADMIN — CEFIDEROCOL SULFATE TOSYLATE 33.33 MILLIGRAM(S): 1 INJECTION, POWDER, FOR SOLUTION INTRAVENOUS at 18:35

## 2025-01-01 RX ADMIN — Medication 2: at 05:55

## 2025-01-01 RX ADMIN — HYDROMORPHONE HYDROCHLORIDE 0.25 MILLIGRAM(S): 4 INJECTION, SOLUTION INTRAMUSCULAR; INTRAVENOUS; SUBCUTANEOUS at 05:35

## 2025-01-01 RX ADMIN — Medication 4.5 UNIT(S)/MIN: at 04:52

## 2025-01-01 RX ADMIN — PHENYLEPHRINE HYDROCHLORIDE 7.46 MICROGRAM(S)/KG/MIN: 10 INJECTION INTRAVENOUS at 00:50

## 2025-01-01 RX ADMIN — MIDODRINE HYDROCHLORIDE 20 MILLIGRAM(S): 5 TABLET ORAL at 05:01

## 2025-01-01 RX ADMIN — IMIPENEM AND CILASTATIN 100 MILLIGRAM(S): 250; 250 INJECTION, POWDER, FOR SOLUTION INTRAVENOUS at 16:56

## 2025-01-01 RX ADMIN — ALBUMIN HUMAN 50 MILLILITER(S): 50 SOLUTION INTRAVENOUS at 00:33

## 2025-01-01 RX ADMIN — HYDROMORPHONE HYDROCHLORIDE 0.25 MILLIGRAM(S): 4 INJECTION, SOLUTION INTRAMUSCULAR; INTRAVENOUS; SUBCUTANEOUS at 06:30

## 2025-01-01 RX ADMIN — ALBUMIN HUMAN 50 MILLILITER(S): 50 SOLUTION INTRAVENOUS at 15:43

## 2025-01-01 RX ADMIN — ALBUMIN HUMAN 100 MILLILITER(S): 50 SOLUTION INTRAVENOUS at 04:40

## 2025-01-01 RX ADMIN — Medication 2.5 MILLIGRAM(S): at 23:04

## 2025-01-01 RX ADMIN — URSODIOL 300 MILLIGRAM(S): 250 TABLET, FILM COATED ORAL at 17:11

## 2025-01-01 RX ADMIN — MUPIROCIN 1 APPLICATION(S): 2 CREAM TOPICAL at 06:30

## 2025-01-01 RX ADMIN — PANTOPRAZOLE 40 MILLIGRAM(S): 20 TABLET, DELAYED RELEASE ORAL at 06:27

## 2025-01-01 RX ADMIN — VORICONAZOLE 125 MILLIGRAM(S): 10 INJECTION, POWDER, LYOPHILIZED, FOR SOLUTION INTRAVENOUS at 08:59

## 2025-01-01 RX ADMIN — VORICONAZOLE 125 MILLIGRAM(S): 10 INJECTION, POWDER, LYOPHILIZED, FOR SOLUTION INTRAVENOUS at 06:19

## 2025-01-01 RX ADMIN — Medication 100 MILLIGRAM(S): at 21:05

## 2025-01-01 RX ADMIN — Medication 100 MILLIGRAM(S): at 13:47

## 2025-01-01 RX ADMIN — Medication 4.5 UNIT(S)/MIN: at 23:03

## 2025-01-01 RX ADMIN — Medication 16 MILLIGRAM(S): at 05:29

## 2025-01-01 RX ADMIN — SULFAMETHOXAZOLE AND TRIMETHOPRIM 259.38 MILLIGRAM(S): 400; 80 TABLET ORAL at 09:54

## 2025-01-01 RX ADMIN — Medication 6 UNIT(S): at 00:02

## 2025-01-01 RX ADMIN — IMIPENEM AND CILASTATIN 100 MILLIGRAM(S): 250; 250 INJECTION, POWDER, FOR SOLUTION INTRAVENOUS at 22:10

## 2025-01-01 RX ADMIN — BUDESONIDE 0.5 MILLIGRAM(S): 9 TABLET, EXTENDED RELEASE ORAL at 05:45

## 2025-01-01 RX ADMIN — Medication 4 UNIT(S)/HR: at 21:13

## 2025-01-01 RX ADMIN — NYSTATIN 1 APPLICATION(S): 100000 POWDER TOPICAL at 06:34

## 2025-01-01 RX ADMIN — MUPIROCIN 1 APPLICATION(S): 2 CREAM TOPICAL at 17:09

## 2025-01-01 RX ADMIN — PANTOPRAZOLE 40 MILLIGRAM(S): 20 TABLET, DELAYED RELEASE ORAL at 17:01

## 2025-01-01 RX ADMIN — MIDODRINE HYDROCHLORIDE 30 MILLIGRAM(S): 5 TABLET ORAL at 13:20

## 2025-01-01 RX ADMIN — IMIPENEM AND CILASTATIN 100 MILLIGRAM(S): 250; 250 INJECTION, POWDER, FOR SOLUTION INTRAVENOUS at 16:40

## 2025-01-01 RX ADMIN — CYCLOSPORINE 75 MILLIGRAM(S): 100 CAPSULE, GELATIN COATED ORAL at 07:32

## 2025-01-01 RX ADMIN — Medication 6: at 21:16

## 2025-01-01 RX ADMIN — Medication 4.5 UNIT(S)/MIN: at 07:29

## 2025-01-01 RX ADMIN — IMIPENEM AND CILASTATIN 100 MILLIGRAM(S): 250; 250 INJECTION, POWDER, FOR SOLUTION INTRAVENOUS at 16:16

## 2025-01-01 RX ADMIN — OXYCODONE HYDROCHLORIDE 5 MILLIGRAM(S): 30 TABLET ORAL at 05:00

## 2025-01-01 RX ADMIN — Medication 100 MILLIGRAM(S): at 22:15

## 2025-01-01 RX ADMIN — Medication 6 UNIT(S): at 12:29

## 2025-01-01 RX ADMIN — ANTISEPTIC SURGICAL SCRUB 15 MILLILITER(S): 0.04 SOLUTION TOPICAL at 17:27

## 2025-01-01 RX ADMIN — ALBUMIN HUMAN 50 MILLILITER(S): 50 SOLUTION INTRAVENOUS at 19:46

## 2025-01-01 RX ADMIN — URSODIOL 300 MILLIGRAM(S): 250 TABLET, FILM COATED ORAL at 17:26

## 2025-01-01 RX ADMIN — MIDODRINE HYDROCHLORIDE 10 MILLIGRAM(S): 5 TABLET ORAL at 14:10

## 2025-01-01 RX ADMIN — CYCLOSPORINE 75 MILLIGRAM(S): 100 CAPSULE, GELATIN COATED ORAL at 19:11

## 2025-01-01 RX ADMIN — Medication 25 MILLIGRAM(S): at 06:00

## 2025-01-01 RX ADMIN — MIDODRINE HYDROCHLORIDE 30 MILLIGRAM(S): 5 TABLET ORAL at 05:01

## 2025-01-01 RX ADMIN — MIRTAZAPINE 7.5 MILLIGRAM(S): 30 TABLET, FILM COATED ORAL at 21:58

## 2025-01-01 RX ADMIN — IMIPENEM AND CILASTATIN 100 MILLIGRAM(S): 250; 250 INJECTION, POWDER, FOR SOLUTION INTRAVENOUS at 21:20

## 2025-01-01 RX ADMIN — ALBUMIN HUMAN 100 MILLILITER(S): 50 SOLUTION INTRAVENOUS at 11:52

## 2025-01-01 RX ADMIN — PANTOPRAZOLE 40 MILLIGRAM(S): 20 TABLET, DELAYED RELEASE ORAL at 17:30

## 2025-01-01 RX ADMIN — NYSTATIN 1 APPLICATION(S): 100000 POWDER TOPICAL at 18:05

## 2025-01-01 RX ADMIN — PANTOPRAZOLE 40 MILLIGRAM(S): 20 TABLET, DELAYED RELEASE ORAL at 17:18

## 2025-01-01 RX ADMIN — BUDESONIDE 0.5 MILLIGRAM(S): 9 TABLET, EXTENDED RELEASE ORAL at 17:29

## 2025-01-01 RX ADMIN — ANTISEPTIC SURGICAL SCRUB 15 MILLILITER(S): 0.04 SOLUTION TOPICAL at 18:22

## 2025-01-01 RX ADMIN — ROMIPLOSTIM 100 MICROGRAM(S): 250 INJECTION, POWDER, LYOPHILIZED, FOR SOLUTION SUBCUTANEOUS at 11:54

## 2025-01-01 RX ADMIN — ANTISEPTIC SURGICAL SCRUB 15 MILLILITER(S): 0.04 SOLUTION TOPICAL at 17:04

## 2025-01-01 RX ADMIN — NOREPINEPHRINE BITARTRATE 4.66 MICROGRAM(S)/KG/MIN: 1 INJECTION, SOLUTION, CONCENTRATE INTRAVENOUS at 16:40

## 2025-01-01 RX ADMIN — Medication 2: at 17:30

## 2025-01-01 RX ADMIN — Medication 100 MILLIGRAM(S): at 13:14

## 2025-01-01 RX ADMIN — EPOETIN ALFA 20000 UNIT(S): 2000 SOLUTION INTRAVENOUS; SUBCUTANEOUS at 22:16

## 2025-01-01 RX ADMIN — DIPHENOXYLATE HYDROCHLORIDE AND ATROPINE SULFATE 2 TABLET(S): 2.5; .025 TABLET ORAL at 17:25

## 2025-01-01 RX ADMIN — ANTISEPTIC SURGICAL SCRUB 15 MILLILITER(S): 0.04 SOLUTION TOPICAL at 05:16

## 2025-01-01 RX ADMIN — URSODIOL 300 MILLIGRAM(S): 250 TABLET, FILM COATED ORAL at 17:35

## 2025-01-01 RX ADMIN — Medication 4 UNIT(S)/HR: at 07:16

## 2025-01-01 RX ADMIN — MIDODRINE HYDROCHLORIDE 30 MILLIGRAM(S): 5 TABLET ORAL at 21:05

## 2025-01-01 RX ADMIN — MIDODRINE HYDROCHLORIDE 10 MILLIGRAM(S): 5 TABLET ORAL at 13:17

## 2025-01-01 RX ADMIN — CYCLOSPORINE 25 MILLIGRAM(S): 100 CAPSULE, GELATIN COATED ORAL at 08:12

## 2025-01-01 RX ADMIN — CASPOFUNGIN ACETATE 250 MILLIGRAM(S): 5 INJECTION, POWDER, LYOPHILIZED, FOR SOLUTION INTRAVENOUS at 14:08

## 2025-01-01 RX ADMIN — HYDROMORPHONE HYDROCHLORIDE 0.25 MILLIGRAM(S): 4 INJECTION, SOLUTION INTRAMUSCULAR; INTRAVENOUS; SUBCUTANEOUS at 04:38

## 2025-01-01 RX ADMIN — HYDROMORPHONE HYDROCHLORIDE 0.25 MILLIGRAM(S): 4 INJECTION, SOLUTION INTRAMUSCULAR; INTRAVENOUS; SUBCUTANEOUS at 02:00

## 2025-01-01 RX ADMIN — OXYCODONE HYDROCHLORIDE 5 MILLIGRAM(S): 30 TABLET ORAL at 06:25

## 2025-01-01 RX ADMIN — PHENYLEPHRINE HYDROCHLORIDE 3.73 MICROGRAM(S)/KG/MIN: 10 INJECTION INTRAVENOUS at 08:05

## 2025-01-01 RX ADMIN — Medication 4.5 UNIT(S)/MIN: at 20:05

## 2025-01-01 RX ADMIN — IPRATROPIUM BROMIDE AND ALBUTEROL SULFATE 3 MILLILITER(S): .5; 2.5 SOLUTION RESPIRATORY (INHALATION) at 17:12

## 2025-01-01 RX ADMIN — CYCLOSPORINE 75 MILLIGRAM(S): 100 CAPSULE, GELATIN COATED ORAL at 09:14

## 2025-01-01 RX ADMIN — MUPIROCIN 1 APPLICATION(S): 2 CREAM TOPICAL at 17:21

## 2025-01-01 RX ADMIN — ALBUMIN HUMAN 50 MILLILITER(S): 50 SOLUTION INTRAVENOUS at 23:18

## 2025-01-01 RX ADMIN — FILGRASTIM-SNDZ 480 MICROGRAM(S): 300 INJECTION, SOLUTION INTRAVENOUS; SUBCUTANEOUS at 12:02

## 2025-01-01 RX ADMIN — CYCLOSPORINE 75 MILLIGRAM(S): 100 CAPSULE, GELATIN COATED ORAL at 07:38

## 2025-01-01 RX ADMIN — HYDROMORPHONE HYDROCHLORIDE 0.25 MILLIGRAM(S): 4 INJECTION, SOLUTION INTRAMUSCULAR; INTRAVENOUS; SUBCUTANEOUS at 04:15

## 2025-01-01 RX ADMIN — MIDODRINE HYDROCHLORIDE 10 MILLIGRAM(S): 5 TABLET ORAL at 06:31

## 2025-01-01 RX ADMIN — HYDROMORPHONE HYDROCHLORIDE 0.25 MILLIGRAM(S): 4 INJECTION, SOLUTION INTRAMUSCULAR; INTRAVENOUS; SUBCUTANEOUS at 19:30

## 2025-01-01 RX ADMIN — PANTOPRAZOLE 40 MILLIGRAM(S): 20 TABLET, DELAYED RELEASE ORAL at 17:26

## 2025-01-01 RX ADMIN — DIPHENOXYLATE HYDROCHLORIDE AND ATROPINE SULFATE 2 TABLET(S): 2.5; .025 TABLET ORAL at 11:57

## 2025-01-01 RX ADMIN — GANCICLOVIR 100 MILLIGRAM(S): 250 CAPSULE ORAL at 05:01

## 2025-01-01 RX ADMIN — BUDESONIDE 0.5 MILLIGRAM(S): 9 TABLET, EXTENDED RELEASE ORAL at 05:04

## 2025-01-01 RX ADMIN — PANTOPRAZOLE 40 MILLIGRAM(S): 20 TABLET, DELAYED RELEASE ORAL at 17:36

## 2025-01-01 RX ADMIN — MEROPENEM 100 MILLIGRAM(S): 500 INJECTION INTRAVENOUS at 22:11

## 2025-01-01 RX ADMIN — HYDROMORPHONE HYDROCHLORIDE 0.5 MILLIGRAM(S): 4 INJECTION, SOLUTION INTRAMUSCULAR; INTRAVENOUS; SUBCUTANEOUS at 06:45

## 2025-01-01 RX ADMIN — PHENYLEPHRINE HYDROCHLORIDE 74.6 MICROGRAM(S)/KG/MIN: 10 INJECTION INTRAVENOUS at 07:52

## 2025-01-01 RX ADMIN — GANCICLOVIR 100 MILLIGRAM(S): 250 CAPSULE ORAL at 18:09

## 2025-01-01 RX ADMIN — SULFAMETHOXAZOLE AND TRIMETHOPRIM 268.75 MILLIGRAM(S): 400; 80 TABLET ORAL at 07:23

## 2025-01-01 RX ADMIN — Medication 5 MILLIGRAM(S): at 23:16

## 2025-01-01 RX ADMIN — NYSTATIN 1 APPLICATION(S): 100000 POWDER TOPICAL at 05:24

## 2025-01-01 RX ADMIN — PHENYLEPHRINE HYDROCHLORIDE 3.73 MICROGRAM(S)/KG/MIN: 10 INJECTION INTRAVENOUS at 19:50

## 2025-01-01 RX ADMIN — PHENYLEPHRINE HYDROCHLORIDE 74.6 MICROGRAM(S)/KG/MIN: 10 INJECTION INTRAVENOUS at 20:04

## 2025-01-01 RX ADMIN — MUPIROCIN 1 APPLICATION(S): 2 CREAM TOPICAL at 17:37

## 2025-01-01 RX ADMIN — Medication 1 APPLICATION(S): at 12:08

## 2025-01-01 RX ADMIN — Medication 8 UNIT(S): at 13:06

## 2025-01-01 RX ADMIN — Medication 25 MILLIGRAM(S): at 05:33

## 2025-01-01 RX ADMIN — HYDROMORPHONE HYDROCHLORIDE 0.25 MILLIGRAM(S): 4 INJECTION, SOLUTION INTRAMUSCULAR; INTRAVENOUS; SUBCUTANEOUS at 09:47

## 2025-01-01 RX ADMIN — HYDROMORPHONE HYDROCHLORIDE 0.25 MILLIGRAM(S): 4 INJECTION, SOLUTION INTRAMUSCULAR; INTRAVENOUS; SUBCUTANEOUS at 05:00

## 2025-01-01 RX ADMIN — Medication 6 UNIT(S): at 12:17

## 2025-01-01 RX ADMIN — LOPERAMIDE HYDROCHLORIDE 2 MILLIGRAM(S): 2 CAPSULE ORAL at 11:58

## 2025-01-01 RX ADMIN — CEFIDEROCOL SULFATE TOSYLATE 33.33 MILLIGRAM(S): 1 INJECTION, POWDER, FOR SOLUTION INTRAVENOUS at 04:03

## 2025-01-01 RX ADMIN — SULFAMETHOXAZOLE AND TRIMETHOPRIM 268.75 MILLIGRAM(S): 400; 80 TABLET ORAL at 03:14

## 2025-01-01 RX ADMIN — PANTOPRAZOLE 40 MILLIGRAM(S): 20 TABLET, DELAYED RELEASE ORAL at 17:10

## 2025-01-01 RX ADMIN — QUETIAPINE FUMARATE 25 MILLIGRAM(S): 300 TABLET ORAL at 21:31

## 2025-01-01 RX ADMIN — NYSTATIN 1 APPLICATION(S): 100000 POWDER TOPICAL at 05:05

## 2025-01-01 RX ADMIN — Medication 4 MILLILITER(S): at 11:14

## 2025-01-01 RX ADMIN — MIDODRINE HYDROCHLORIDE 20 MILLIGRAM(S): 5 TABLET ORAL at 13:14

## 2025-01-01 RX ADMIN — OXYCODONE HYDROCHLORIDE 2.5 MILLIGRAM(S): 30 TABLET ORAL at 13:57

## 2025-01-01 RX ADMIN — MUPIROCIN 1 APPLICATION(S): 2 CREAM TOPICAL at 18:02

## 2025-01-01 RX ADMIN — LOPERAMIDE HYDROCHLORIDE 2 MILLIGRAM(S): 2 CAPSULE ORAL at 05:59

## 2025-01-01 RX ADMIN — DIPHENOXYLATE HYDROCHLORIDE AND ATROPINE SULFATE 1 TABLET(S): 2.5; .025 TABLET ORAL at 05:48

## 2025-01-01 RX ADMIN — HYDROMORPHONE HYDROCHLORIDE 0.25 MILLIGRAM(S): 4 INJECTION, SOLUTION INTRAMUSCULAR; INTRAVENOUS; SUBCUTANEOUS at 19:55

## 2025-01-01 RX ADMIN — MIDODRINE HYDROCHLORIDE 30 MILLIGRAM(S): 5 TABLET ORAL at 13:07

## 2025-01-01 RX ADMIN — Medication 100 MILLIGRAM(S): at 05:17

## 2025-01-01 RX ADMIN — MEROPENEM 100 MILLIGRAM(S): 500 INJECTION INTRAVENOUS at 16:07

## 2025-01-01 RX ADMIN — NYSTATIN 1 APPLICATION(S): 100000 POWDER TOPICAL at 17:29

## 2025-01-01 RX ADMIN — VORICONAZOLE 125 MILLIGRAM(S): 10 INJECTION, POWDER, LYOPHILIZED, FOR SOLUTION INTRAVENOUS at 06:07

## 2025-01-01 RX ADMIN — Medication 32 MILLIGRAM(S): at 11:13

## 2025-01-01 RX ADMIN — CYCLOSPORINE 75 MILLIGRAM(S): 100 CAPSULE, GELATIN COATED ORAL at 20:47

## 2025-01-01 RX ADMIN — POTASSIUM CHLORIDE 100 MILLIEQUIVALENT(S): 750 TABLET, EXTENDED RELEASE ORAL at 00:49

## 2025-01-01 RX ADMIN — Medication 12.5 MILLIGRAM(S): at 17:27

## 2025-01-01 RX ADMIN — LOPERAMIDE HYDROCHLORIDE 2 MILLIGRAM(S): 2 CAPSULE ORAL at 23:16

## 2025-01-01 RX ADMIN — Medication 5 MILLIGRAM(S): at 18:38

## 2025-01-01 RX ADMIN — GANCICLOVIR 100 MILLIGRAM(S): 250 CAPSULE ORAL at 06:34

## 2025-01-01 RX ADMIN — Medication 16 MILLIGRAM(S): at 05:07

## 2025-01-01 RX ADMIN — ALBUMIN HUMAN 100 MILLILITER(S): 50 SOLUTION INTRAVENOUS at 10:55

## 2025-01-01 RX ADMIN — MIDODRINE HYDROCHLORIDE 20 MILLIGRAM(S): 5 TABLET ORAL at 06:04

## 2025-01-01 RX ADMIN — ANTISEPTIC SURGICAL SCRUB 1 APPLICATION(S): 0.04 SOLUTION TOPICAL at 05:47

## 2025-01-01 RX ADMIN — GANCICLOVIR 100 MILLIGRAM(S): 250 CAPSULE ORAL at 19:30

## 2025-01-01 RX ADMIN — HYDROMORPHONE HYDROCHLORIDE 0.5 MILLIGRAM(S): 4 INJECTION, SOLUTION INTRAMUSCULAR; INTRAVENOUS; SUBCUTANEOUS at 05:05

## 2025-01-01 RX ADMIN — Medication 4.5 UNIT(S)/MIN: at 16:57

## 2025-01-01 RX ADMIN — HYDROMORPHONE HYDROCHLORIDE 0.25 MILLIGRAM(S): 4 INJECTION, SOLUTION INTRAMUSCULAR; INTRAVENOUS; SUBCUTANEOUS at 13:06

## 2025-01-01 RX ADMIN — SULFAMETHOXAZOLE AND TRIMETHOPRIM 268.75 MILLIGRAM(S): 400; 80 TABLET ORAL at 13:17

## 2025-01-01 RX ADMIN — LOPERAMIDE HYDROCHLORIDE 2 MILLIGRAM(S): 2 CAPSULE ORAL at 23:45

## 2025-01-01 RX ADMIN — ANTISEPTIC SURGICAL SCRUB 15 MILLILITER(S): 0.04 SOLUTION TOPICAL at 17:20

## 2025-01-01 RX ADMIN — MIDODRINE HYDROCHLORIDE 20 MILLIGRAM(S): 5 TABLET ORAL at 21:35

## 2025-01-01 RX ADMIN — MIDODRINE HYDROCHLORIDE 20 MILLIGRAM(S): 5 TABLET ORAL at 13:18

## 2025-01-01 RX ADMIN — IMIPENEM AND CILASTATIN 100 MILLIGRAM(S): 250; 250 INJECTION, POWDER, FOR SOLUTION INTRAVENOUS at 00:09

## 2025-01-01 RX ADMIN — Medication 5 MILLIGRAM(S): at 11:33

## 2025-01-01 RX ADMIN — BUDESONIDE 0.5 MILLIGRAM(S): 9 TABLET, EXTENDED RELEASE ORAL at 17:59

## 2025-01-01 RX ADMIN — MIDODRINE HYDROCHLORIDE 20 MILLIGRAM(S): 5 TABLET ORAL at 21:46

## 2025-01-01 RX ADMIN — MUPIROCIN 1 APPLICATION(S): 2 CREAM TOPICAL at 06:28

## 2025-01-01 RX ADMIN — Medication 6: at 18:01

## 2025-01-01 RX ADMIN — ALBUMIN HUMAN 50 MILLILITER(S): 50 SOLUTION INTRAVENOUS at 05:08

## 2025-01-01 RX ADMIN — NYSTATIN 1 APPLICATION(S): 100000 POWDER TOPICAL at 06:30

## 2025-01-01 RX ADMIN — Medication 100 MILLIGRAM(S): at 21:32

## 2025-01-01 RX ADMIN — VORICONAZOLE 125 MILLIGRAM(S): 10 INJECTION, POWDER, LYOPHILIZED, FOR SOLUTION INTRAVENOUS at 18:07

## 2025-01-01 RX ADMIN — PANTOPRAZOLE 40 MILLIGRAM(S): 20 TABLET, DELAYED RELEASE ORAL at 05:13

## 2025-01-01 RX ADMIN — PANTOPRAZOLE 40 MILLIGRAM(S): 20 TABLET, DELAYED RELEASE ORAL at 17:56

## 2025-01-01 RX ADMIN — ACETAMINOPHEN, DIPHENHYDRAMINE HCL, PHENYLEPHRINE HCL 5 MILLIGRAM(S): 325; 25; 5 TABLET ORAL at 21:10

## 2025-01-01 RX ADMIN — BUDESONIDE 0.5 MILLIGRAM(S): 9 TABLET, EXTENDED RELEASE ORAL at 05:23

## 2025-01-01 RX ADMIN — MUPIROCIN 1 APPLICATION(S): 2 CREAM TOPICAL at 05:40

## 2025-01-01 RX ADMIN — ALBUMIN HUMAN 50 MILLILITER(S): 50 SOLUTION INTRAVENOUS at 23:27

## 2025-01-01 RX ADMIN — DIPHENOXYLATE HYDROCHLORIDE AND ATROPINE SULFATE 2 TABLET(S): 2.5; .025 TABLET ORAL at 12:44

## 2025-01-01 RX ADMIN — MEROPENEM 100 MILLIGRAM(S): 500 INJECTION INTRAVENOUS at 05:43

## 2025-01-01 RX ADMIN — URSODIOL 300 MILLIGRAM(S): 250 TABLET, FILM COATED ORAL at 05:22

## 2025-01-01 RX ADMIN — ANTISEPTIC SURGICAL SCRUB 15 MILLILITER(S): 0.04 SOLUTION TOPICAL at 05:05

## 2025-01-01 RX ADMIN — SULFAMETHOXAZOLE AND TRIMETHOPRIM 210 MILLIGRAM(S): 400; 80 TABLET ORAL at 11:09

## 2025-01-01 RX ADMIN — MIDODRINE HYDROCHLORIDE 30 MILLIGRAM(S): 5 TABLET ORAL at 13:03

## 2025-01-01 RX ADMIN — PHENYLEPHRINE HYDROCHLORIDE 7.46 MICROGRAM(S)/KG/MIN: 10 INJECTION INTRAVENOUS at 09:40

## 2025-01-01 RX ADMIN — IMMUNE GLOBULIN INFUSION (HUMAN) 33.33 GRAM(S): 100 INJECTION, SOLUTION INTRAVENOUS; SUBCUTANEOUS at 14:00

## 2025-01-01 RX ADMIN — ANTISEPTIC SURGICAL SCRUB 15 MILLILITER(S): 0.04 SOLUTION TOPICAL at 17:37

## 2025-01-01 RX ADMIN — MUPIROCIN 1 APPLICATION(S): 2 CREAM TOPICAL at 05:57

## 2025-01-01 RX ADMIN — CYCLOSPORINE 75 MILLIGRAM(S): 100 CAPSULE, GELATIN COATED ORAL at 19:42

## 2025-01-01 RX ADMIN — IMIPENEM AND CILASTATIN 100 MILLIGRAM(S): 250; 250 INJECTION, POWDER, FOR SOLUTION INTRAVENOUS at 22:08

## 2025-01-01 RX ADMIN — IPRATROPIUM BROMIDE AND ALBUTEROL SULFATE 3 MILLILITER(S): .5; 2.5 SOLUTION RESPIRATORY (INHALATION) at 23:24

## 2025-01-01 RX ADMIN — BUDESONIDE 0.5 MILLIGRAM(S): 9 TABLET, EXTENDED RELEASE ORAL at 17:23

## 2025-01-01 RX ADMIN — SULFAMETHOXAZOLE AND TRIMETHOPRIM 210 MILLIGRAM(S): 400; 80 TABLET ORAL at 11:31

## 2025-01-01 RX ADMIN — HYDROMORPHONE HYDROCHLORIDE 0.25 MILLIGRAM(S): 4 INJECTION, SOLUTION INTRAMUSCULAR; INTRAVENOUS; SUBCUTANEOUS at 14:45

## 2025-01-01 RX ADMIN — NYSTATIN 1 APPLICATION(S): 100000 POWDER TOPICAL at 05:02

## 2025-01-01 RX ADMIN — IMIPENEM AND CILASTATIN 100 MILLIGRAM(S): 250; 250 INJECTION, POWDER, FOR SOLUTION INTRAVENOUS at 09:58

## 2025-01-01 RX ADMIN — SULFAMETHOXAZOLE AND TRIMETHOPRIM 268.75 MILLIGRAM(S): 400; 80 TABLET ORAL at 01:29

## 2025-01-01 RX ADMIN — PANTOPRAZOLE 40 MILLIGRAM(S): 20 TABLET, DELAYED RELEASE ORAL at 05:28

## 2025-01-01 RX ADMIN — Medication 4.5 UNIT(S)/MIN: at 11:48

## 2025-01-01 RX ADMIN — ALBUMIN HUMAN 100 MILLILITER(S): 50 SOLUTION INTRAVENOUS at 16:40

## 2025-01-01 RX ADMIN — MUPIROCIN 1 APPLICATION(S): 2 CREAM TOPICAL at 05:56

## 2025-01-01 RX ADMIN — ALBUMIN HUMAN 50 MILLILITER(S): 50 SOLUTION INTRAVENOUS at 04:42

## 2025-01-01 RX ADMIN — DIPHENOXYLATE HYDROCHLORIDE AND ATROPINE SULFATE 2 TABLET(S): 2.5; .025 TABLET ORAL at 12:14

## 2025-01-01 RX ADMIN — ACETAMINOPHEN, DIPHENHYDRAMINE HCL, PHENYLEPHRINE HCL 5 MILLIGRAM(S): 325; 25; 5 TABLET ORAL at 21:49

## 2025-01-01 RX ADMIN — Medication 4 MILLILITER(S): at 05:16

## 2025-01-01 RX ADMIN — ALBUMIN HUMAN 50 MILLILITER(S): 50 SOLUTION INTRAVENOUS at 06:00

## 2025-01-01 RX ADMIN — MEROPENEM 100 MILLIGRAM(S): 500 INJECTION INTRAVENOUS at 14:11

## 2025-01-01 RX ADMIN — DIPHENOXYLATE HYDROCHLORIDE AND ATROPINE SULFATE 2 TABLET(S): 2.5; .025 TABLET ORAL at 17:48

## 2025-01-01 RX ADMIN — IMIPENEM AND CILASTATIN 100 MILLIGRAM(S): 250; 250 INJECTION, POWDER, FOR SOLUTION INTRAVENOUS at 15:53

## 2025-01-01 RX ADMIN — CYCLOSPORINE 50 MILLIGRAM(S): 100 CAPSULE, GELATIN COATED ORAL at 07:36

## 2025-01-01 RX ADMIN — DIPHENOXYLATE HYDROCHLORIDE AND ATROPINE SULFATE 2 TABLET(S): 2.5; .025 TABLET ORAL at 05:24

## 2025-01-01 RX ADMIN — BUDESONIDE 0.5 MILLIGRAM(S): 9 TABLET, EXTENDED RELEASE ORAL at 17:18

## 2025-01-01 RX ADMIN — PHENYLEPHRINE HYDROCHLORIDE 74.6 MICROGRAM(S)/KG/MIN: 10 INJECTION INTRAVENOUS at 05:06

## 2025-01-01 RX ADMIN — GANCICLOVIR 100 MILLIGRAM(S): 250 CAPSULE ORAL at 18:12

## 2025-01-01 RX ADMIN — GANCICLOVIR 100 MILLIGRAM(S): 250 CAPSULE ORAL at 06:39

## 2025-01-01 RX ADMIN — CASPOFUNGIN ACETATE 250 MILLIGRAM(S): 5 INJECTION, POWDER, LYOPHILIZED, FOR SOLUTION INTRAVENOUS at 13:14

## 2025-01-01 RX ADMIN — MUPIROCIN 1 APPLICATION(S): 2 CREAM TOPICAL at 05:16

## 2025-01-01 RX ADMIN — CASPOFUNGIN ACETATE 250 MILLIGRAM(S): 5 INJECTION, POWDER, LYOPHILIZED, FOR SOLUTION INTRAVENOUS at 15:15

## 2025-01-01 RX ADMIN — PANTOPRAZOLE 40 MILLIGRAM(S): 20 TABLET, DELAYED RELEASE ORAL at 05:39

## 2025-01-01 RX ADMIN — Medication 6 UNIT(S): at 05:31

## 2025-01-01 RX ADMIN — GANCICLOVIR 100 MILLIGRAM(S): 250 CAPSULE ORAL at 20:03

## 2025-01-01 RX ADMIN — ALBUMIN HUMAN 1000 MILLILITER(S): 50 SOLUTION INTRAVENOUS at 20:29

## 2025-01-01 RX ADMIN — HYDROMORPHONE HYDROCHLORIDE 0.25 MILLIGRAM(S): 4 INJECTION, SOLUTION INTRAMUSCULAR; INTRAVENOUS; SUBCUTANEOUS at 22:12

## 2025-01-01 RX ADMIN — PHENYLEPHRINE HYDROCHLORIDE 74.6 MICROGRAM(S)/KG/MIN: 10 INJECTION INTRAVENOUS at 04:52

## 2025-01-01 RX ADMIN — MUPIROCIN 1 APPLICATION(S): 2 CREAM TOPICAL at 17:31

## 2025-01-01 RX ADMIN — DIPHENOXYLATE HYDROCHLORIDE AND ATROPINE SULFATE 2 TABLET(S): 2.5; .025 TABLET ORAL at 11:24

## 2025-01-01 RX ADMIN — HYDROMORPHONE HYDROCHLORIDE 0.5 MILLIGRAM(S): 4 INJECTION, SOLUTION INTRAMUSCULAR; INTRAVENOUS; SUBCUTANEOUS at 04:36

## 2025-01-01 RX ADMIN — MIDODRINE HYDROCHLORIDE 20 MILLIGRAM(S): 5 TABLET ORAL at 21:31

## 2025-01-01 RX ADMIN — ALBUMIN HUMAN 1000 MILLILITER(S): 50 SOLUTION INTRAVENOUS at 19:23

## 2025-01-01 RX ADMIN — SULFAMETHOXAZOLE AND TRIMETHOPRIM 268.75 MILLIGRAM(S): 400; 80 TABLET ORAL at 01:03

## 2025-01-01 RX ADMIN — URSODIOL 300 MILLIGRAM(S): 250 TABLET, FILM COATED ORAL at 05:58

## 2025-01-01 RX ADMIN — GANCICLOVIR 100 MILLIGRAM(S): 250 CAPSULE ORAL at 18:08

## 2025-01-01 RX ADMIN — NYSTATIN 1 APPLICATION(S): 100000 POWDER TOPICAL at 17:28

## 2025-01-01 RX ADMIN — Medication 4.5 UNIT(S)/MIN: at 14:15

## 2025-01-01 RX ADMIN — Medication 4.5 UNIT(S)/MIN: at 19:19

## 2025-01-01 RX ADMIN — MUPIROCIN 1 APPLICATION(S): 2 CREAM TOPICAL at 06:34

## 2025-01-01 RX ADMIN — PHENYLEPHRINE HYDROCHLORIDE 74.6 MICROGRAM(S)/KG/MIN: 10 INJECTION INTRAVENOUS at 21:48

## 2025-01-01 RX ADMIN — OXYCODONE HYDROCHLORIDE 5 MILLIGRAM(S): 30 TABLET ORAL at 08:50

## 2025-01-01 RX ADMIN — PANTOPRAZOLE 40 MILLIGRAM(S): 20 TABLET, DELAYED RELEASE ORAL at 17:08

## 2025-01-01 RX ADMIN — Medication 25 MILLILITER(S): at 06:32

## 2025-01-01 RX ADMIN — HYDROMORPHONE HYDROCHLORIDE 0.25 MILLIGRAM(S): 4 INJECTION, SOLUTION INTRAMUSCULAR; INTRAVENOUS; SUBCUTANEOUS at 22:42

## 2025-01-01 RX ADMIN — LOPERAMIDE HYDROCHLORIDE 4 MILLIGRAM(S): 2 CAPSULE ORAL at 05:12

## 2025-01-01 RX ADMIN — Medication 4: at 06:29

## 2025-01-01 RX ADMIN — NYSTATIN 1 APPLICATION(S): 100000 POWDER TOPICAL at 05:59

## 2025-01-01 RX ADMIN — PANTOPRAZOLE 40 MILLIGRAM(S): 20 TABLET, DELAYED RELEASE ORAL at 17:27

## 2025-01-01 RX ADMIN — DIPHENOXYLATE HYDROCHLORIDE AND ATROPINE SULFATE 2 TABLET(S): 2.5; .025 TABLET ORAL at 17:11

## 2025-01-01 NOTE — PROGRESS NOTE ADULT - ASSESSMENT
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   s/p OLT 11/15 with complicated post op course    [] Septic shock/Cardiogenic shock  [] s/p Colectomy 12/7   [] RTOR for closure and Ileostomy creation   [] IAPB 12/7- removed 12/10  - E coli Bacteremia (12/6)  Received Tobra x 1 12/6 .   - EC faecalis asc fluid ( 12/20) (12/26)  - Ec faecalis UTI (12/16)  - Tx ID following - Caspo/Bactrim/Minocycline/Flagyl   - Amikacin x1 12/26  - Vanco x1 12/26, 12/28 -> Linezolid added  - Neutropenia: received Neupogen 480mcg x2  - MORAIMA: CRRT started 12/7, stopped 12/15, resumed CRRT 12/23  - AMS; CT Head neg  - Hold diuretics   - 12/23 BCX: GPR  - 12/23 CT chest/Abd/pelvis: GGO, splenic infarcts, ileus  - S/P tracheotomy 12/17  - UGIB s/p EGD showing generalized oozing, coagulopathy       - given 2u FFP, 2u cryo, 2u PLT (12/26)       - receive 2uFFP, 2u cryo, 2u PLT, 3 uPRBC 12/27       - Protonix ggt       - TF at goal    [] s/p OLT POD #47  - LFT's downtrending from 12/25, Liver US: tortuous common hepatic artery with elevated peak systolic velocities up to 635 cm/s, not noted on prior study. growing perihepatic fluid collection  - repeat liver US unchanged  - Diet: increase TFs as needed  - Pain management: avoid narcotics  - Strict I&Os, wound vac placed 12/10   - US: L brachial DVT (holding daap)  - wound vac exchange for ileostomy (12/13)  - HIT panel neg (12/14)  - s/p 2PRBC, 2 cryo, 3 plts  - repeat CT CAP    [ ] Immunosuppression  - Tacrolimus stopped 11/18 in setting of AMS/Seizure, Started Cyclo 12/17  - Cyclo per level, MMF HELD, Solumedrol 32 mg daily  - PPx: Gancyclovir (HELD) in setting of thrombocytopenia; repeat CMV PCR pending     [] lleus   -@ home on Linzess  - imaging with distended colon (likely opioid induced)  - s/p Neostigmine x 2  - s/p Relistor, last dose 12/1  - s/p colectomy with end ileostomy  (see above)  - ileostomy with >1L output, consider adding bulking agents    [] CMV viremia  - d/c gancylovir due to thrombocytopenia  - CMV PCR (12/11 and 12/16): neg, positive CMVPCR on 12/23 repeat CMV pending from 12/24    [ ] AMS  - Reintubated 11/20 Aspiration/hypoxia, extubated 11/24->[id number]--> extubated 11/26 -> [id number] -> tracheostomy 12/17 -> trach collar trials starting 12/29  - EEG 11/30 negative, Neurology following  - BH following   - off tacro  - on keppra  -CT Head No Cont (12/20): Age-indeterminate posterior left frontal lobe infarct.   -CT Head (12/25): Evolving subacute infarct in the left supramarginal gyrus  -repeat CTH     [ ] HTN/ pAFib  [ ] coronary vasospasm vs posterior wall MI  - EKG 12/24 c/f ST depression, rising troponins: 1800s  - d/c argatroban and aspirin due to bleeding  - Heparin gtt 12/19-21  - Cards- plan for PCI prior to DC   - Off Amiodarone, off dobutamine  - Off metoprolol for soft bp.  - Dr. 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   s/p OLT 11/15 with complicated post op course    [] Septic shock/Cardiogenic shock  [] s/p Colectomy 12/7   [] RTOR for closure and Ileostomy creation   [] IAPB 12/7- removed 12/10  - E coli Bacteremia (12/6)  Received Tobra x 1 12/6 .   - EC faecalis asc fluid ( 12/20) (12/26)  - Ec faecalis UTI (12/16)  - Tx ID following - Caspo/Bactrim/Flagyl   - Amikacin x1 12/26  - Vanco x1 12/26, 12/28 -> Linezolid added  - Neutropenia: received Neupogen 480mcg x2  - MORAIMA: CRRT started 12/7, stopped 12/15, resumed CRRT 12/23  - AMS; CT Head neg  - Hold diuretics   - 12/23 BCX: GPR  - 12/23 CT chest/Abd/pelvis: GGO, splenic infarcts, ileus  - S/P tracheotomy 12/17  - UGIB s/p EGD showing generalized oozing, coagulopathy       - given 2u FFP, 2u cryo, 2u PLT (12/26)       - receive 2uFFP, 2u cryo, 2u PLT, 3 uPRBC 12/27       - Protonix ggt       - TF at goal    [] s/p OLT POD #47  - LFT's downtrending from 12/25, Liver US: tortuous common hepatic artery with elevated peak systolic velocities up to 635 cm/s, not noted on prior study. growing perihepatic fluid collection  - repeat liver US unchanged  - Diet: increase TFs as needed  - Pain management: avoid narcotics  - Strict I&Os, wound vac placed 12/10   - US: L brachial DVT (holding daap)  - wound vac exchange for ileostomy (12/13)  - HIT panel neg (12/14)  - add ursodiol 300mgBID   - repeat CT CAP    [ ] Immunosuppression  - Tacrolimus stopped 11/18 in setting of AMS/Seizure, Started Cyclo 12/17  - Cyclo per level, MMF HELD, Solumedrol 32 mg daily  - PPx: Gancyclovir (HELD) in setting of thrombocytopenia; repeat CMV PCR pending     [] lleus   -@ home on Linzess  - imaging with distended colon (likely opioid induced)  - s/p Neostigmine x 2  - s/p Relistor, last dose 12/1  - s/p colectomy with end ileostomy  (see above)  - ileostomy with >1L output, consider adding bulking agents    [] CMV viremia  - d/c gancylovir due to thrombocytopenia  - CMV PCR (12/11 and 12/16): neg, positive CMVPCR on 12/23 repeat CMV pending from 12/24    [ ] AMS  - Reintubated 11/20 Aspiration/hypoxia, extubated 11/24->[id number]/24--> extubated 11/26 -> [id number]/7 -> tracheostomy 12/17 -> trach collar trials starting 12/29  - EEG 11/30 negative, Neurology following  - BH following   - off tacro  - on keppra  -CT Head No Cont (12/20): Age-indeterminate posterior left frontal lobe infarct.   -CT Head (12/25): Evolving subacute infarct in the left supramarginal gyrus  -repeat CTH     [ ] HTN/ pAFib  [ ] coronary vasospasm vs posterior wall MI  - EKG 12/24 c/f ST depression, rising troponins: 1800s  - d/c argatroban and aspirin due to bleeding  - Heparin gtt 12/19-21  - Cards- plan for PCI prior to DC   - Off Amiodarone, off dobutamine  - Off metoprolol for soft bp.  - Dr. Quintanilla following    [ ] DM  - ISS     []Scrotal abscess   - US (12/12): 2.1 x0.9 x 3.2 cm focal, right testicle- possible abscess (12/12)  - Urology recs: CT scrotum review no debridement required  - Urology recs Derm consult for guidance on wound care   - 12/23 US doppler scrotum: no arterial flow, limited venous flow, bl hydrocele  - 12/15 CT CAP no acute changes   - Elevate scrotum w/ support Urology signed off 12/29  - CT scrotum when able

## 2025-01-01 NOTE — PROGRESS NOTE ADULT - SUBJECTIVE AND OBJECTIVE BOX
24 HOUR EVENTS:  INTERVAL EVENTS:  - Midodrine increased to 10mg q8  - Try trach collar for 6hrs total AM  - 1u cryo  - Trach collar 4h x2 tomorrow  - f/u CTH and abd/pelvis/scrotum non-con  - plan to replace NG with KO tube      NEURO  Exam: awake, alert, oriented  Meds: fentaNYL    Injectable 25 MICROGram(s) IV Push every 6 hours PRN Breakthrough Pain  oxyCODONE    Solution 5 milliGRAM(s) Oral every 4 hours PRN Severe Pain (7 - 10)  oxyCODONE    Solution 2.5 milliGRAM(s) Oral every 4 hours PRN Moderate Pain (4 - 6)    [x] Adequacy of sedation and pain control has been assessed and adjusted      RESPIRATORY  RR: 20 (01-01-25 @ 00:45) (13 - 46)  SpO2: 100% (01-01-25 @ 00:45) (89% - 100%)  Wt(kg): --  Exam: unlabored, clear to auscultation bilaterally  Mechanical Ventilation: Mode: AC/ CMV (Assist Control/ Continuous Mandatory Ventilation), RR (machine): 14, RR (patient): 21, TV (machine): 450, FiO2: 30, PEEP: 5, ITime: 1, MAP: 7.3, PIP: 14  ABG - ( 31 Dec 2024 23:35 )  pH: 7.41  /  pCO2: 36    /  pO2: 116   / HCO3: 23    / Base Excess: -1.6  /  SaO2: 98.8    Lactate: x                [N/A] Extubation Readiness Assessed  Meds: albuterol/ipratropium for Nebulization 3 milliLiter(s) Nebulizer every 6 hours PRN Shortness of Breath and/or Wheezing  buDESOnide    Inhalation Suspension 0.5 milliGRAM(s) Inhalation every 12 hours        CARDIOVASCULAR  HR: 95 (01-01-25 @ 00:45) (84 - 118)  BP: 113/57 (12-31-24 @ 19:45) (109/55 - 113/57)  BP(mean): 82 (12-31-24 @ 19:45) (75 - 82)  ABP: 112/54 (01-01-25 @ 00:45) (95/44 - 148/68)  ABP(mean): 78 (01-01-25 @ 00:45) (63 - 104)  Wt(kg): --  CVP(cm H2O): --      Exam: regular rate and rhythm  Cardiac Rhythm: sinus  Perfusion     [x]Adequate   [ ]Inadequate  Mentation   [x]Normal       [ ]Reduced  Extremities  [x]Warm         [ ]Cool  Volume Status [ ]Hypervolemic [x]Euvolemic [ ]Hypovolemic  Meds: midodrine 10 milliGRAM(s) Oral every 8 hours  phenylephrine    Infusion 0.1 MICROgram(s)/kG/Min IV Continuous <Continuous>        GI/NUTRITION  Exam: soft, nontender, nondistended, incision C/D/I  Diet:  Meds: pantoprazole  Injectable 40 milliGRAM(s) IV Push every 12 hours      GENITOURINARY  I&O's Detail    12-30 @ 07:01  -  12-31 @ 07:00  --------------------------------------------------------  IN:    Albumin 5%  - 250 mL: 1000 mL    Cryoprecipitate: 150 mL    Enteral Tube Flush: 290 mL    IV PiggyBack: 350 mL    IV PiggyBack: 950 mL    Nepro with Carb Steady: 1200 mL    Phenylephrine: 61.8 mL    Phenylephrine: 111.3 mL    Platelets - Single Donor: 670 mL    PRBCs (Packed Red Blood Cells): 300 mL    Vasopressin: 148.5 mL  Total IN: 5231.6 mL    OUT:    Bulb (mL): 300 mL    Bulb (mL): 500 mL    Ileostomy (mL): 2575 mL    Other (mL): 2153 mL  Total OUT: 5528 mL    Total NET: -296.4 mL      12-31 @ 07:01  -  01-01 @ 01:01  --------------------------------------------------------  IN:    Albumin 5%  - 250 mL: 250 mL    Cryoprecipitate: 75 mL    Enteral Tube Flush: 180 mL    IV PiggyBack: 510 mL    Nepro with Carb Steady: 900 mL    Phenylephrine: 175.6 mL    Vasopressin: 76.5 mL  Total IN: 2167.1 mL    OUT:    Bulb (mL): 375 mL    Bulb (mL): 70 mL    Ileostomy (mL): 1550 mL    Other (mL): 1068 mL    VAC (Vacuum Assisted Closure) System (mL): 0 mL  Total OUT: 3063 mL    Total NET: -895.9 mL          12-31    137  |  104  |  21  ----------------------------<  160[H]  4.1   |  21[L]  |  <0.30[L]    Ca    7.8[L]      31 Dec 2024 23:44  Phos  3.0     12-31  Mg     2.5     12-31    TPro  3.8[L]  /  Alb  2.8[L]  /  TBili  21.3[H]  /  DBili  x   /  AST  154[H]  /  ALT  238[H]  /  AlkPhos  128[H]  12-31    [ ] Castro catheter, indication: N/A  Meds:       HEMATOLOGIC  Meds:   [x] VTE Prophylaxis                        9.0    6.45  )-----------( 26       ( 31 Dec 2024 23:44 )             26.4     PT/INR - ( 31 Dec 2024 23:44 )   PT: 22.1 sec;   INR: 1.93 ratio         PTT - ( 31 Dec 2024 23:44 )  PTT:58.1 sec      INFECTIOUS DISEASES  WBC Count: 6.45 K/uL (12-31 @ 23:44)  WBC Count: 7.49 K/uL (12-31 @ 18:32)  WBC Count: 5.75 K/uL (12-31 @ 12:06)  WBC Count: 7.47 K/uL (12-31 @ 06:32)    RECENT CULTURES:  Specimen Source: .Blood BLOOD  Date/Time: 12-28 @ 18:10  Culture Results:   No growth at 48 Hours  Gram Stain: --  Organism: --  Specimen Source: .Blood BLOOD  Date/Time: 12-28 @ 12:15  Culture Results:   No growth at 72 Hours  Gram Stain: --  Organism: --    Meds: caspofungin IVPB      caspofungin IVPB 50 milliGRAM(s) IV Intermittent every 24 hours  cycloSPORINE  , modified (GENGRAF) Solution 25 milliGRAM(s) Oral <User Schedule>  cycloSPORINE  , modified (GENGRAF) Solution 50 milliGRAM(s) Oral <User Schedule>  metroNIDAZOLE  IVPB 500 milliGRAM(s) IV Intermittent every 8 hours  minocycline IVPB      minocycline IVPB 200 milliGRAM(s) IV Intermittent every 12 hours  trimethoprim / sulfamethoxazole IVPB 160 milliGRAM(s) IV Intermittent daily        ENDOCRINE  CAPILLARY BLOOD GLUCOSE      POCT Blood Glucose.: 146 mg/dL (31 Dec 2024 23:03)  POCT Blood Glucose.: 174 mg/dL (31 Dec 2024 18:27)  POCT Blood Glucose.: 92 mg/dL (31 Dec 2024 11:56)  POCT Blood Glucose.: 158 mg/dL (31 Dec 2024 06:10)    Meds: insulin lispro (ADMELOG) corrective regimen sliding scale   SubCutaneous every 6 hours  methylPREDNISolone sodium succinate Injectable 32 milliGRAM(s) IV Push <User Schedule>  vasopressin Infusion 0.03 Unit(s)/Min IV Continuous <Continuous>        CODE STATUS:

## 2025-01-01 NOTE — PROGRESS NOTE ADULT - SUBJECTIVE AND OBJECTIVE BOX
HPI:  Patient seen and examined at bedside in SICU.    Review Of Systems:   Unable to assess        Medications:  albumin human  5% IVPB 500 milliLiter(s) IV Intermittent every 8 hours  albuterol/ipratropium for Nebulization 3 milliLiter(s) Nebulizer every 6 hours PRN  buDESOnide    Inhalation Suspension 0.5 milliGRAM(s) Inhalation every 12 hours  caspofungin IVPB      caspofungin IVPB 50 milliGRAM(s) IV Intermittent every 24 hours  chlorhexidine 0.12% Liquid 15 milliLiter(s) Oral Mucosa every 12 hours  chlorhexidine 2% Cloths 1 Application(s) Topical <User Schedule>  CRRT Treatment    <Continuous>  cycloSPORINE  , modified (GENGRAF) Solution 50 milliGRAM(s) Oral <User Schedule>  diphenoxylate/atropine 1 Tablet(s) Oral four times a day  fentaNYL    Injectable 25 MICROGram(s) IV Push every 6 hours PRN  FIRST- Mouthwash  BLM 10 milliLiter(s) Swish and Spit every 8 hours PRN  insulin lispro (ADMELOG) corrective regimen sliding scale   SubCutaneous every 6 hours  meropenem  IVPB      meropenem  IVPB 1000 milliGRAM(s) IV Intermittent every 12 hours  methylPREDNISolone sodium succinate Injectable 32 milliGRAM(s) IV Push <User Schedule>  metroNIDAZOLE  IVPB 500 milliGRAM(s) IV Intermittent every 8 hours  midodrine 10 milliGRAM(s) Oral every 8 hours  mupirocin 2% Ointment 1 Application(s) Topical every 12 hours  nystatin Powder 1 Application(s) Topical every 12 hours  oxyCODONE    Solution 5 milliGRAM(s) Oral every 4 hours PRN  oxyCODONE    Solution 2.5 milliGRAM(s) Oral every 4 hours PRN  pantoprazole  Injectable 40 milliGRAM(s) IV Push every 12 hours  phenylephrine    Infusion 0.1 MICROgram(s)/kG/Min IV Continuous <Continuous>  Phoxillum Filtration BK 4 / 2.5 5000 milliLiter(s) CRRT <Continuous>  Phoxillum Filtration BK 4 / 2.5 5000 milliLiter(s) CRRT <Continuous>  PrismaSATE Dialysate BGK 4 / 2.5 5000 milliLiter(s) CRRT <Continuous>  trimethoprim  40 mG/sulfamethoxazole 200 mG Suspension 80 milliGRAM(s) Enteral Tube daily  ursodiol Suspension 300 milliGRAM(s) Oral every 12 hours  vasopressin Infusion 0.03 Unit(s)/Min IV Continuous <Continuous>    PAST MEDICAL & SURGICAL HISTORY:  Diabetes      Transaminitis      Paroxysmal atrial fibrillation      Depression      BPH (benign prostatic hyperplasia)      Hypertension      Chronic atrial fibrillation      Coronary artery disease      Hepatocellular carcinoma      DM (diabetes mellitus)      HTN (hypertension)      Paroxysmal atrial fibrillation      Cirrhosis      HCC (hepatocellular carcinoma)      History of BPH      History of laparoscopic cholecystectomy      History of lumbar laminectomy      H/O prior ablation treatment      H/O percutaneous left heart catheterization        Vitals:  T(C): 36.5 (01-02-25 @ 07:00), Max: 36.8 (01-01-25 @ 15:00)  HR: 95 (01-02-25 @ 07:30) (85 - 105)  BP: 104/66 (01-01-25 @ 19:45) (104/66 - 115/58)  BP(mean): 80 (01-01-25 @ 19:45) (80 - 82)  RR: 20 (01-02-25 @ 07:30) (15 - 35)  SpO2: 96% (01-02-25 @ 07:30) (93% - 100%)  Wt(kg): --  Daily     Daily   I&O's Summary    01 Jan 2025 07:01  -  02 Jan 2025 07:00  --------------------------------------------------------  IN: 5081 mL / OUT: 4678 mL / NET: 403 mL        Physical Exam:  Appearance: Critically ill  Eyes: PERRL, EOMI, pink conjunctiva  HENT: +Trach, +NGT  Cardiovascular: RRR, S1, S2  Respiratory: Clear to auscultation bilaterally  Gastrointestinal: soft, non-tender, non-distended with normal bowel sounds  Musculoskeletal: No clubbing; no joint deformity   Neurologic: Non-focal                          7.7    4.42  )-----------( 22       ( 02 Jan 2025 07:02 )             23.1     01-02    138  |  106  |  21  ----------------------------<  133[H]  4.0   |  19[L]  |  <0.30[L]    Ca    8.0[L]      02 Jan 2025 00:47  Phos  3.0     01-02  Mg     2.4     01-02    TPro  3.8[L]  /  Alb  3.1[L]  /  TBili  22.4[H]  /  DBili  x   /  AST  95[H]  /  ALT  210[H]  /  AlkPhos  106  01-02    PT/INR - ( 02 Jan 2025 00:52 )   PT: 25.9 sec;   INR: 2.27 ratio         PTT - ( 02 Jan 2025 00:52 )  PTT:75.8 sec      Cardiovascular Diagnostic Testing:  ECG: sinus rhythm    Echo: < from: Intra-Operative Transesophageal Echo W or WO Ultrasound Enhancing Agent (11.15.24 @ 07:46) >  --------------------------------------------------------------------------------  PRE-BYPASS FINDINGS     Left Ventricle:  Left ventricular ejection fraction is estimated at 60 to 65%. Normal left ventricularwall thickness. The left ventricular systolic function is normal There are no regional wall motion abnormalities seen.     Right Ventricle:  The right ventricular cavity is normal in size, with normal wall thickness and right ventricular systolic function is normal. Right ventricular systolic function is normal.     Left Atrium:  The left atrium is normal in size. No thrombus visualized in left atrial appendage.     Right Atrium:  The right atrium is normal in size. The right atrium is normal in size.     Interatrial Septum:  No PFO visualized with color flow doppler.     Aortic Valve:  The aortic valve appears trileaflet. There is no aortic valve stenosis. There is trace aortic regurgitation.     Mitral Valve:  There is no mitral valve stenosis. There is no mitral valve stenosis. There is trace mitral regurgitation.     Tricuspid Valve:  There is no evidence of tricuspid stenosis. There is trace tricuspid regurgitation.     Pericardium:  No pericardial effusion seen.     Post-Bypass:  S/P OLT. Under current loading conditions, hyperdynamic left ventricular systolic function, EF: 70-75% by visual estimate, no RWMA. Normal right ventricular systolic function. All other findings unchanged. Probe removed atraumatically, no blood. The patient tolerated the procedure well and without complications. Permanent recorded images are stored in the medical record.     Electronically signed by James Villareal on 11/15/2024 at 2:18:16 PM         *** Final ***    < end of copied text >      < from: TTE Limited W or WO Ultrasound Enhancing Agent (12.11.24 @ 09:28) >  _______________________________________________________________________________________     CONCLUSIONS:      1. Endocardial visualization enhanced with Definity (Ultrasound Enhancing Agent).   2. Left ventricular systolic function is normal with an ejection fraction visually estimated at 55 to 60 %.   3. Enlarged right ventricular cavity size and mildly reduced right ventricular systolic function.   4. Device lead is visualized in the right heart.    ________________________________________________________________________________________    < end of copied text >      Stress Testing:     Cath: 4/24/2024, patient had his LHC repeated with Ben Villareal MD at [facility].  Cath showed multivessel coronary artery disease, but the lesions previously noted were FFR negative.  He continues to have MIRTA 3 flow throughout.    Interpretation of Telemetry: Sinus     Imaging:

## 2025-01-01 NOTE — PROGRESS NOTE ADULT - ASSESSMENT
74 male w/ a PMHx of HTN, DM, AF, BPH, MASH cirrhosis and HCC s/p OLT on 11/15. Case was uneventful but post-op course has been prolonged and c/b delirium, aspiration, multiple episodes of acute hypoxic respiratory failure requiring reintubation from 11/20-11/24 & 11/24-11/26, AF w/ RVR, ileus requiring NGT, distended colon requiring rectal tube, dysphagia, malnutrition, hypernatremia, fevers, pancytopenia, poorly controlled glucoses, and left brachial VTE. Patient went into severe refractory septic shock on 12/6 w/ blood cultures positive for E. coli s/p s/p ex lap, total abd colectomy, end ileostomy, 3 intentionally retained laparotomy pads for bleeding, Abthera, IABP placement w/ admission to CTICU, Patient required inotropes, Jacqui, and CRRT post-op. s/p closure 12/9. IABP removed 12/10 and transferred back to SICU 12/13. SICU course c/b narrow complex arrythmia w/ ECG showing new ST elevations concerning for posterior wall MI vs vasospasms, cards consulted and started on heparin gtt for empiric ACS tx which was c/b GIB then transitioned to argatroban c/b bleed. TTE revealed LVOT obstruction & TODD.       Neuro:  - Pain: PRN oxy solution  - Opens eyes intermittently, Not following commands, off sedation  - CT head 11/19, 11/21, 11/25, 11/29, 12/5 negative  - EEG: Mild diffuse cerebral dysfunction that is not specific in etiology. No epileptic discharges recorded. No seizures recorded.  - Holding home xanax, Abilify, Zoloft, Remeron, Lexapro  - CT head 12/20 showing age-indeterminate infarct, f/u Neurology recs  - No need for MRI     Resp:  - S/p bedside tracheostomy 12/17  - PRVC 14/450/5/30  - trach collar daily, full support overnight  - c/w inhaled bronchodilator    CV:  - Pressors: kassandra  - home metoprolol 12.5 q8hr held for pressor req  - IABP removed 12/10  - TTE 12/6 w/ LVOT obstruction & TODD  - TTE 12/11 shows EF of 55-60%  - 10mg midodrine q8    GI:  - trend LFTs  - NPO, NGT (can be to gravity tube feeds or to gravity, no suction)   - protonix now BID  - monitor ALYSSA output    /Renal:  - CRRT restarted 12/23  - Monitor BUN/Cr, I&Os, trend UOP  - Scrotal US 12/15 -> edema, no abscess -> elevate  - repeat scrotal doppler for concern of ischemia > low flow state, low concern for abscess  - Bladder scan q12      Heme:  - trend H/H, PLTs, coags  - hep gtt and argatroban gtt held i/s/o bleed  - s/p desmopression 30 x 1 on 12/26    ID:  - ABX: caspo, bactrim, minocycline, flagyl, cyclosporine  - 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

## 2025-01-01 NOTE — PROGRESS NOTE ADULT - SUBJECTIVE AND OBJECTIVE BOX
[facility] DIVISION OF KIDNEY DISEASES AND HYPERTENSION -- FOLLOW UP NOTE  --------------------------------------------------------------------------------  Authored by: Terra Christie   Cell # [phone]     STERLING BRYANT was seen and examined at bedside.   Patient is a 74y old  Male who presents with a chief complaint of Liver Transplant (01-01-25)      24 hour events/subjective: On CRRT, net even, anuric, on vasopressors with vaso and kassandra, midodrine increased to 10mg q8h. Afebrile       Standing Inpatient Medications  buDESOnide    Inhalation Suspension 0.5 milliGRAM(s) Inhalation every 12 hours   caspofungin IVPB 50 milliGRAM(s) IV Intermittent every 24 hours  cycloSPORINE  , modified (GENGRAF) Solution 25 milliGRAM(s) Oral <User Schedule>  cycloSPORINE  , modified (GENGRAF) Solution 50 milliGRAM(s) Oral <User Schedule>  insulin lispro (ADMELOG) corrective regimen sliding scale   SubCutaneous every 6 hours  methylPREDNISolone sodium succinate Injectable 32 milliGRAM(s) IV Push <User Schedule>  metroNIDAZOLE  IVPB 500 milliGRAM(s) IV Intermittent every 8 hours  midodrine 10 milliGRAM(s) Oral every 8 hours  minocycline IVPB 200 milliGRAM(s) IV Intermittent every 12 hours  mupirocin 2% Ointment 1 Application(s) Topical every 12 hours  nystatin Powder 1 Application(s) Topical every 12 hours  pantoprazole  Injectable 40 milliGRAM(s) IV Push every 12 hours  phenylephrine    Infusion 0.1 MICROgram(s)/kG/Min IV Continuous <Continuous>  trimethoprim / sulfamethoxazole IVPB 160 milliGRAM(s) IV Intermittent daily  vasopressin Infusion 0.03 Unit(s)/Min IV Continuous <Continuous>    PRN Inpatient Medications  albuterol/ipratropium for Nebulization 3 milliLiter(s) Nebulizer every 6 hours PRN  fentaNYL    Injectable 25 MICROGram(s) IV Push every 6 hours PRN  FIRST- Mouthwash  BLM 10 milliLiter(s) Swish and Spit every 8 hours PRN  oxyCODONE    Solution 5 milliGRAM(s) Oral every 4 hours PRN  oxyCODONE    Solution 2.5 milliGRAM(s) Oral every 4 hours PRN        VITALS/PHYSICAL EXAM  --------------------------------------------------------------------------------  T(C): 36.3 (01-01-25 @ 07:00), Max: 36.8 (01-01-25 @ 03:00)  HR: 97 (01-01-25 @ 10:00) (84 - 118)  BP: 115/58 (01-01-25 @ 08:30) (113/57 - 115/58)  RR: 16 (01-01-25 @ 10:00) (15 - 35)  SpO2: 100% (01-01-25 @ 10:00) (92% - 100%)    12-31-24 @ 07:01  -  01-01-25 @ 07:00  --------------------------------------------------------  IN: 3252 mL / OUT: 4209 mL / NET: -957 mL    01-01-25 @ 07:01 - 01-01-25 @ 10:32  --------------------------------------------------------  IN: 292.1 mL / OUT: 527 mL / NET: -234.9 mL      Physical Exam:  Unresponsive, on vent via tracheostomy  Icterus present   NGT +  Right IJ temporary dialysis catheter   Abdomen + ileostomy with liquid brown stool, VAC dressing present   Minimal edema   Superficial skin ulceration b/l thighs          LABS/STUDIES  --------------------------------------------------------------------------------              9.0    6.29  >-----------<  47       [01-01-25 @ 06:18]              26.3     136  |  104  |  22  ----------------------------<  158      [01-01-25 @ 06:18]  4.1   |  20  |  <0.30        Ca     7.9     [01-01-25 @ 06:18]      Mg     2.6     [01-01-25 @ 06:18]      Phos  3.1     [01-01-25 @ 06:18]    TPro  4.0  /  Alb  2.9  /  TBili  21.4  /  DBili  x   /  AST  135  /  ALT  237  /  AlkPhos  131  [01-01-25 @ 06:18]    PT/INR: PT 20.5 , INR 1.79       [01-01-25 @ 06:18]  PTT: 50.3       [01-01-25 @ 06:18]      Creatinine Trend:  SCr <0.30 [01-01 @ 06:18]  SCr <0.30 [12-31 @ 23:44]  SCr <0.30 [12-31 @ 18:32]  SCr 0.30 [12-31 @ 12:06]  SCr 0.32 [12-31 @ 06:32]        CAPILLARY BLOOD GLUCOSE  POCT Blood Glucose.: 146 mg/dL (31 Dec 2024 23:03)  POCT Blood Glucose.: 174 mg/dL (31 Dec 2024 18:27)  POCT Blood Glucose.: 92 mg/dL (31 Dec 2024 11:56)

## 2025-01-01 NOTE — PROGRESS NOTE ADULT - SUBJECTIVE AND OBJECTIVE BOX
Subjective:  Patient resting with son at bedside.    Objective:    Vital signs  T(C): , Max: 36.8 (01-01-25 @ 03:00)  HR: 93 (01-01-25 @ 15:15)  BP: 115/58 (01-01-25 @ 08:30)  SpO2: 100% (01-01-25 @ 15:15)  Wt(kg): --    Output     12-31 @ 07:01  -  01-01 @ 07:00  --------------------------------------------------------  IN: 3252 mL / OUT: 4209 mL / NET: -957 mL    01-01 @ 07:01  -  01-01 @ 15:14  --------------------------------------------------------  IN: 1286 mL / OUT: 1478 mL / NET: -192 mL        Gen: NAD  Abd: soft, nontender, nondistended  : nichols secured in place, draining cola colored urine, scrotal skin surface a green-tinged color with healthy mucosa underlying, ulcer noted on foreskin with healthy mucosa underlying    Labs                        9.1    6.51  )-----------( 34       ( 01 Jan 2025 12:26 )             27.3     01 Jan 2025 12:26    136    |  104    |  22     ----------------------------<  173    4.0     |  20     |  <0.30    Ca    7.9        01 Jan 2025 12:26  Phos  3.0       01 Jan 2025 12:26  Mg     2.5       01 Jan 2025 12:26        Urine Cx: ?  Blood Cx: ?      Imaging

## 2025-01-01 NOTE — PROGRESS NOTE ADULT - ASSESSMENT
73 year old man with GONZALES cirrhosis and HCC, HTN, DM, AF, BPH  Underwent liver transplant on  11/15/24. Post-op course complicated by  septic shock in the setting of ischemic bowel s/p total colectomy and ileostomy creation 12/9/24  Has poor mental status, acute hypoxic respiratory failure requiring multiple intubations/extubations, eventually had tracheostomy on 12/17/24.    UGI bleed 12/26/24.     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    E. coli Bacteremia (12/6), high fungitell 188 (12/14), Ec Faecalis UTI (12/16), Stenotrophomonas in trach aspirate (12/19) - on Caspofungin, metronidazole and minocycline.   Shock requiring vasopressors - on  phenylephrine and vasopressin, also on midodrine.   Initiated on CRRT on 12/7, not tolerating iHD due to hypotension  Lyes and volume status fair,  continue CRRT net even  OLT on cyclosporine and Methylpred

## 2025-01-01 NOTE — PROGRESS NOTE ADULT - SUBJECTIVE AND OBJECTIVE BOX
Transplant Surgery - Multidisciplinary Rounds  --------------------------------------------------------------  OLT   11/15/2024        POD#47  Colectomy 12/7/2024   POD#23  IABP 12/7 removed 12/10  Tracheostomy 12/17/2024    Present:   Patient seen and examined with multidisciplinary Transplant team including Surgeon: Dr. Vora, JAZZ Pabon, Hepatologist: Dr. Conteh and bedside RN in AM rounds.   Disciplines not in attendance will be notified of the plan.     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     s/p OLT 11/15 with post op course c/b:  Hypoxia: Reintubated 11/20, extubated 11/24->reintubated 11/24, extubated 11/26, reintubated 12/7, trached 12/17  Ileus   A fib  AMS/Seizure   L brachial DVT  Neutropenia  E coli Bacteremia (12/6)  s/p Coloctomy 12/7.   IABP 12/7, removed 12/10  Ec Faecalis UTI (12/16)  Stenotrophamonas in trach aspirate (12/19)  UGIB 12/26    Interval/Overnight Events:   -Afebrile, on kassandra and vaso   -on CRRT net even, anuric   -continued correction per teg    Immunosuppression:  - Cyclo per level, MMF HELD, Solu 32  -ongoing monitoring for signs of rejection      TX DATA here      ROS: Unable to assess patient is lethargic, s/p tracheostomy    PHYSICAL EXAM:   Constitutional: trach to vent, awake  Eyes:  PERRLA  ENMT: nc/at, no thrush   Neck: supple, trach   Respiratory: CTA B/L  Cardiovascular: RRR  Gastrointestinal: incision clean/dry/intact + Ostomy pink output in bag, wound vac, ALYSSA x2 SS fluid  Genitourinary: +scrotal edema w/ large fluid collection; black discoloration   Extremities: SCD's in place and working bilaterally, + LE edema  Neurological: trach to vent    Skin: no rashes, ulcerations, lesions  Transplant Surgery - Multidisciplinary Rounds  --------------------------------------------------------------  OLT   11/15/2024        POD#47  Colectomy 12/7/2024   POD#26  IABP 12/7 removed 12/10  Tracheostomy 12/17/2024    Present:   Patient seen and examined with multidisciplinary Transplant team including Surgeon: Dr. Vora, JAZZ Pabon, Hepatologist: Dr. Conteh and bedside RN in AM rounds.   Disciplines not in attendance will be notified of the plan.     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     s/p OLT 11/15 with post op course c/b:  Hypoxia: Reintubated 11/20, extubated 11/24->reintubated 11/24, extubated 11/26, reintubated 12/7, trached 12/17  Ileus   A fib  AMS/Seizure   L brachial DVT  Neutropenia  E coli Bacteremia (12/6)  s/p Coloctomy 12/7.   IABP 12/7, removed 12/10  Ec Faecalis UTI (12/16)  Stenotrophamonas in trach aspirate (12/19)  UGIB 12/26    Interval/Overnight Events:   -Afebrile, on kassandra and vaso   -on CRRT net even, anuric   -continued correction per teg    Immunosuppression:  - Cyclo per level, MMF HELD, Solu 32  -ongoing monitoring for signs of rejection        MEDICATIONS  (STANDING):  albumin human  5% IVPB 500 milliLiter(s) IV Intermittent every 8 hours  buDESOnide    Inhalation Suspension 0.5 milliGRAM(s) Inhalation every 12 hours  caspofungin IVPB      caspofungin IVPB 50 milliGRAM(s) IV Intermittent every 24 hours  chlorhexidine 0.12% Liquid 15 milliLiter(s) Oral Mucosa every 12 hours  chlorhexidine 2% Cloths 1 Application(s) Topical <User Schedule>  CRRT Treatment    <Continuous>  cycloSPORINE  , modified (GENGRAF) Solution 50 milliGRAM(s) Oral <User Schedule>  diphenoxylate/atropine 1 Tablet(s) Oral four times a day  insulin lispro (ADMELOG) corrective regimen sliding scale   SubCutaneous every 6 hours  meropenem  IVPB      methylPREDNISolone sodium succinate Injectable 32 milliGRAM(s) IV Push <User Schedule>  metroNIDAZOLE  IVPB 500 milliGRAM(s) IV Intermittent every 8 hours  midodrine 10 milliGRAM(s) Oral every 8 hours  mupirocin 2% Ointment 1 Application(s) Topical every 12 hours  nystatin Powder 1 Application(s) Topical every 12 hours  pantoprazole  Injectable 40 milliGRAM(s) IV Push every 12 hours  phenylephrine    Infusion 0.1 MICROgram(s)/kG/Min (3.73 mL/Hr) IV Continuous <Continuous>  Phoxillum Filtration BK 4 / 2.5 5000 milliLiter(s) (1250 mL/Hr) CRRT <Continuous>  Phoxillum Filtration BK 4 / 2.5 5000 milliLiter(s) (250 mL/Hr) CRRT <Continuous>  PrismaSATE Dialysate BGK 4 / 2.5 5000 milliLiter(s) (1500 mL/Hr) CRRT <Continuous>  trimethoprim / sulfamethoxazole IVPB 160 milliGRAM(s) IV Intermittent daily  ursodiol Suspension 300 milliGRAM(s) Oral every 12 hours  vasopressin Infusion 0.03 Unit(s)/Min (4.5 mL/Hr) IV Continuous <Continuous>    MEDICATIONS  (PRN):  albuterol/ipratropium for Nebulization 3 milliLiter(s) Nebulizer every 6 hours PRN Shortness of Breath and/or Wheezing  fentaNYL    Injectable 25 MICROGram(s) IV Push every 6 hours PRN Breakthrough Pain  FIRST- Mouthwash  BLM 10 milliLiter(s) Swish and Spit every 8 hours PRN Mouth Care  oxyCODONE    Solution 5 milliGRAM(s) Oral every 4 hours PRN Severe Pain (7 - 10)  oxyCODONE    Solution 2.5 milliGRAM(s) Oral every 4 hours PRN Moderate Pain (4 - 6)      PAST MEDICAL & SURGICAL HISTORY:  Diabetes      Transaminitis      Paroxysmal atrial fibrillation      Depression      BPH (benign prostatic hyperplasia)      Hypertension      Chronic atrial fibrillation      Coronary artery disease      Hepatocellular carcinoma      DM (diabetes mellitus)      HTN (hypertension)      Paroxysmal atrial fibrillation      Cirrhosis      HCC (hepatocellular carcinoma)      History of BPH      History of laparoscopic cholecystectomy      History of lumbar laminectomy      H/O prior ablation treatment      H/O percutaneous left heart catheterization          Vital Signs Last 24 Hrs  T(C): 36.8 (01 Jan 2025 15:00), Max: 36.8 (01 Jan 2025 03:00)  T(F): 98.2 (01 Jan 2025 15:00), Max: 98.2 (01 Jan 2025 03:00)  HR: 97 (01 Jan 2025 17:00) (84 - 105)  BP: 115/58 (01 Jan 2025 08:30) (113/57 - 115/58)  BP(mean): 82 (01 Jan 2025 08:30) (82 - 82)  RR: 29 (01 Jan 2025 17:00) (15 - 34)  SpO2: 100% (01 Jan 2025 17:00) (95% - 100%)    Parameters below as of 01 Jan 2025 07:00  Patient On (Oxygen Delivery Method): ventilator    O2 Concentration (%): 30    I&O's Summary    31 Dec 2024 07:01  -  01 Jan 2025 07:00  --------------------------------------------------------  IN: 3252 mL / OUT: 4209 mL / NET: -957 mL    01 Jan 2025 07:01  -  01 Jan 2025 17:11  --------------------------------------------------------  IN: 1792.4 mL / OUT: 2190 mL / NET: -397.6 mL                              9.1    6.51  )-----------( 34       ( 01 Jan 2025 12:26 )             27.3     01-01    136  |  104  |  22  ----------------------------<  173[H]  4.0   |  20[L]  |  <0.30[L]    Ca    7.9[L]      01 Jan 2025 12:26  Phos  3.0     01-01  Mg     2.5     01-01    TPro  3.8[L]  /  Alb  2.6[L]  /  TBili  22.0[H]  /  DBili  x   /  AST  119[H]  /  ALT  237[H]  /  AlkPhos  132[H]  01-01          Culture - Blood (collected 12-28-24 @ 18:10)  Source: .Blood BLOOD  Preliminary Report (01-01-25 @ 01:01):    No growth at 72 Hours    Culture - Blood (collected 12-28-24 @ 12:15)  Source: .Blood BLOOD  Preliminary Report (12-31-24 @ 18:01):    No growth at 72 Hours    Culture - Body Fluid with Gram Stain (collected 12-26-24 @ 19:36)  Source: Body Fluid  Gram Stain (12-28-24 @ 20:11):    polymorphonuclear leukocytes seen    No organisms seen    by cytocentrifuge  Final Report (12-31-24 @ 22:42):    Rare Enterococcus faecalis  Organism: Enterococcus faecalis (12-31-24 @ 22:42)  Organism: Enterococcus faecalis (12-31-24 @ 22:42)    Culture - Body Fluid with Gram Stain (collected 12-26-24 @ 19:33)  Source: Body Fluid  Gram Stain (12-27-24 @ 07:00):    polymorphonuclear leukocytes seen    Gram positive cocci in pairs seen    by cytocentrifuge  Final Report (01-01-25 @ 07:44):    Rare Enterococcus faecalis  Organism: Enterococcus faecalis (01-01-25 @ 07:44)  Organism: Enterococcus faecalis (01-01-25 @ 07:44)          ROS: Unable to assess patient is lethargic, s/p tracheostomy    PHYSICAL EXAM:   Constitutional: trach to vent, awake  Eyes:  PERRLA  ENMT: nc/at, no thrush   Neck: supple, trach   Respiratory: CTA B/L  Cardiovascular: RRR  Gastrointestinal: incision clean/dry/intact + Ostomy pink output in bag, wound vac, ALYSSA x2 SS fluid  Genitourinary: +scrotal edema w/ large fluid collection; black discoloration   Extremities: SCD's in place and working bilaterally, + LE edema  Neurological: trach to vent    Skin: no rashes, ulcerations, lesions

## 2025-01-01 NOTE — PROGRESS NOTE ADULT - ATTENDING COMMENTS
called to bedside for difficult nichols and request for nichols to diversion of  urine from scrotum wound and     phimosis required foreskin stretching and complex cath placement.  cont. cath

## 2025-01-01 NOTE — PROCEDURE NOTE - NSICDXPROCEDURE_GEN_ALL_CORE_FT
PROCEDURES:  Foreskin manipulation 01-Jan-2025 15:32:41  Hermilo Castañeda  Complex insertion of Castro catheter 01-Jan-2025 15:33:05  Hermilo Castañeda

## 2025-01-01 NOTE — PROGRESS NOTE ADULT - ASSESSMENT
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 Post-op course c/b worsening mental status and acute hypoxic respiratory failure requiring multiple intubations/extubations, no longer intubated, now s/p tracheostomy (as of 12/23/2024) and cardiogenic shock s/p Percutaneous insertion of an intra-aortic balloon pump and septic shock/colonic ischemia s/p total abdominal colectomy with ileostomy on 12/7/24, s/p ex-lap w/ileostomy on 12/9/24. Urology initially consulted 12/9-12/16 for scrotal edema w/ large skin breakdown. Scrotum at that time was larger, more red edematous, swollen with skin breakdown, no crepitus felt. Scrotal/testicular ultrasounds were performed at that time, all showing scrotal edema, no necrotizing infection/abscess. Urology signed off, recommending scrotal support, wound care, no surgical intervention indicated at that time.     Urology was recalled today 12/23 for worsening of scrotal skin sloughing. Per primary team, edema and scrotal swelling has improved. However, there is a layer of brown film overlaying the ventral area of the scrotum encompassing both the ventral layer of skin of the left and right scrotum. The brown film sloughs and bleed superficially when irritated, it is cold to the touch. There is an area of similar film overlaying a section of the right inner thigh. There is clear delineation from the brown layer of film and normal skin-colored scrotum. The normal colored scrotal skin is warm to touch.  No appreciable flow to testicles shown on ultrasound likely also 2/2 to prolonged pressor requirements decreasing blood flow to testicles and should resolve with improved peripheral perfusion. On examination testicles in normal lie and are soft, no role for orchiopexy for testicles.    12/28/24  Scrotum with no evidence of gangrene, non-indurated, non erythematous. Dark olive appearance suggestive of ischemia possible d/t dependent position and pressors.  12/29/24  Dorsal surface of scrotum appears unchanged.     Urology was recalled today 1/1/25 for ulceration at foreskin of penis and concern for leakage of urine over healing scrotal skin. Decision was made to replace indwelling nichols catheter to divert urine from scrotal skin, STAT lock placed in a way to minimize risk of pressure ulcer where nichols catheter will naturally lie, suprapubic tube not recommended at this time given abdominal vac proximity to potential suprapubic incision site    Recs  -No acute intervention indicated at this time  -Nichols catheter placed > continue nichols catheter as scrotum heals  -Mucosa underneath foreskin appears viable > continue topical wound care  -Scrotum w/ dependent skin changes, possibly exacerbated by pressor.  However, mucosa appears viable  -Continue to elevate scrotum, w/ support  -Urology to sign off at this time. Please reconsult with any new questions or concerns.     Seen and discussed w/ Dr. Jenaro Wells    [facility]  [address] [place] [phone]

## 2025-01-02 ENCOUNTER — APPOINTMENT (OUTPATIENT)
Dept: INTERVENTIONAL RADIOLOGY/VASCULAR | Facility: HOSPITAL | Age: 75
End: 2025-01-02

## 2025-01-02 NOTE — PROGRESS NOTE ADULT - ASSESSMENT
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 Post op course complicated by acute hypoxic respiratory failure requiring intubation multiple times, most recently on 12/7 with conversion to tracheostomy on 12/17, e.coli bacteremia with RTOR on 12/7 for concern for worsening septic shock and cardiogenic shock s/p balloon pump placement and total abdominal colectomy in setting of ischemic bowel s/p OR on 12/9 for ileostomy creation, and olirugirc MORAIMA requiring CRRT and now intermittent HD.    Diagnosed with pneumonia secondary to Stenotrophomonas    Also with growth of Enterococcus faecalis from abdominal drains and urine culture    More fever and shock event on 12/29/24 likely 2/2 GIB    UA (12/19) 2 WBC  BCx (12/23) NGTD  Bronch Cx (12/23) NGTD    CT Chest (12/23) Bibasilar groundglass and tree in bud opacities which may represent distal airway impaction versus pneumonia.    CT A/P (12/23) Peripheral wedge-shaped areas of hypoattenuation involving the superior aspect of the spleen, suggestive of splenic infarcts (12-59). Mildly dilated loops of proximal small bowel without transition point, likely ileus.    Testicular US (12/23) No appreciable arterial flow with very limited venous flow in in the testes bilaterally. Interval decrease in diffuse scrotal edema. Small bilateral hydroceles.    CT Pelvis (12/25) Moderate diffuse subcutaneous/scrotal edema without defined collection or subcutaneous air.    BCX (12/28): Gram variable rods (Blood PCR neg)    Antibiotic Course:  Meropenem ( 11/22-11/29, 11/22-11/29, 12/16-)   Minocycline (12/21-1/1)  Bactrim (11/16-11/24, 12/8-12/11, 12/21-)  Fluconazole (11/16-12/2, 12/12-12/16)   Caspofungin ( 12/6-12/11, 12/17-)  Cefepime (12/10-12/16)   Metronidazole (12/10-12/14)   Ganciclovir (12/7-12/13)   Linezolid (11/23-11/26, 12/6-12/11, 12/28-12/29)   Atovaquone (11/29-12/6)   Zosyn (11/20-11/22,12/6)   Tobramycin (12/6)   Valganciclovir (11/6-12/4)    #Positive Blood Culture (12/23 Clostridium paraputrificum) (12/28: Gram variable rods -Blood PCR neg)  Clostridium paraputrificum is generally penicillin and metronidazole susceptible  Cont Meropenem and Flagyl    #Leukocytosis, Fever, Shock, Transaminitis, Stenotrophomonas Pneumonia  --If further worsening shock overnight would repeat blood cultures and administer Tobramycin 600 mg IV x1 (7 mg/kg based on adjusted body weight).   --Recommend repeat CT A/P (to reevaluate intraabdominal collections and testicular contents)  --s/p 10 day course of Minocycline 200 mg IV Q12H (12/21 >1/1)   --Continue Meropenem 1g IV Q12H  --Doubt need for Caspofungin; no growth of fungal organisms    #Liver Transplant Recipient, Prophylactic Antibiotic  --Repeat CMV PCR on 12/30  --Given thrombocytopenia can consider holding Bactrim or now  Cytomegalovirus By PCR: Det <34.5 IU/mL (12.23.24 @ 06:15)         74 year old male with PMH of DM, HTN, pAfib s/p ablation 2018 (no AC 2/2 thrombocytopenia), CAD, depression, anxiety, BPH, 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 Post op course complicated by acute hypoxic respiratory failure requiring intubation multiple times, most recently on 12/7 with conversion to tracheostomy on 12/17, e.coli bacteremia with RTOR on 12/7 for concern for worsening septic shock and cardiogenic shock s/p balloon pump placement and total abdominal colectomy in setting of ischemic bowel s/p OR on 12/9 for ileostomy creation, and olirugirc MORAIMA requiring CRRT and now intermittent HD.    Diagnosed with pneumonia secondary to Stenotrophomonas    Also with growth of Enterococcus faecalis from abdominal drains and urine culture    More fever and shock event on 12/29/24 likely 2/2 GIB    UA (12/19) 2 WBC  BCx (12/23) Clostridium paraputrificum  Bronch Cx (12/23) NGTD    CT Chest (12/23) Bibasilar groundglass and tree in bud opacities which may represent distal airway impaction versus pneumonia.    CT A/P (12/23) Peripheral wedge-shaped areas of hypoattenuation involving the superior aspect of the spleen, suggestive of splenic infarcts (12-59). Mildly dilated loops of proximal small bowel without transition point, likely ileus.    Testicular US (12/23) No appreciable arterial flow with very limited venous flow in in the testes bilaterally. Interval decrease in diffuse scrotal edema. Small bilateral hydroceles.    CT Pelvis (12/25) Moderate diffuse subcutaneous/scrotal edema without defined collection or subcutaneous air.    BCX (12/28): Gram variable rods (Blood PCR neg)    Antibiotic Course:  Meropenem ( 11/22-11/29, 11/22-11/29, 12/16-)   Minocycline (12/21-1/1)  Bactrim (11/16-11/24, 12/8-12/11, 12/21-)  Fluconazole (11/16-12/2, 12/12-12/16)   Caspofungin ( 12/6-12/11, 12/17-)  Cefepime (12/10-12/16)   Metronidazole (12/10-12/14)   Ganciclovir (12/7-12/13)   Linezolid (11/23-11/26, 12/6-12/11, 12/28-12/29)   Atovaquone (11/29-12/6)   Zosyn (11/20-11/22,12/6)   Tobramycin (12/6)   Valganciclovir (11/6-12/4)    #Positive Blood Culture (12/23 Clostridium paraputrificum) (12/28: Gram variable rods -Blood PCR neg)  Clostridium paraputrificum is generally penicillin and metronidazole susceptible  Cont Meropenem and Flagyl    #Leukocytosis, Fever, Shock, Transaminitis, Stenotrophomonas Pneumonia  --If further worsening shock overnight would repeat blood cultures and administer Tobramycin 600 mg IV x1 (7 mg/kg based on adjusted body weight).   --CT C/A/P showing Small bilateral pleural effusions with associated compressive atelectasis, increased since prior exam. Mild distention and mural thickening of small bowel of concern for enteritis. Status post liver transplant and colectomy with stable postsurgical changes.  --s/p 10 day course of Minocycline 200 mg IV Q12H (12/21 >1/1)   --Continue Meropenem 1g IV Q12H and flagyl 400mg Q8  --Doubt need for Caspofungin; no growth of fungal organisms  -Decrease immunosuppression if possible     #Liver Transplant Recipient, Prophylactic Antibiotic  --Repeat CMV PCR today   --Given thrombocytopenia can consider holding Bactrim or now  Cytomegalovirus By PCR: Det <34.5 IU/mL (12.23.24 @ 06:15)    Discussed w/ Dr. 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 Post op course complicated by acute hypoxic respiratory failure requiring intubation multiple times, most recently on 12/7 with conversion to tracheostomy on 12/17, e.coli bacteremia with RTOR on 12/7 for concern for worsening septic shock and cardiogenic shock s/p balloon pump placement and total abdominal colectomy in setting of ischemic bowel s/p OR on 12/9 for ileostomy creation, and olirugirc MORAIMA requiring CRRT and now intermittent HD.    Diagnosed with pneumonia secondary to Stenotrophomonas    Also with growth of Enterococcus faecalis from abdominal drains and urine culture    More fever and shock event on 12/29/24 likely 2/2 GIB    UA (12/19) 2 WBC  BCx (12/23) Clostridium paraputrificum  Bronch Cx (12/23) NGTD    CT Chest (12/23) Bibasilar groundglass and tree in bud opacities which may represent distal airway impaction versus pneumonia.    CT A/P (12/23) Peripheral wedge-shaped areas of hypoattenuation involving the superior aspect of the spleen, suggestive of splenic infarcts (12-59). Mildly dilated loops of proximal small bowel without transition point, likely ileus.    Testicular US (12/23) No appreciable arterial flow with very limited venous flow in in the testes bilaterally. Interval decrease in diffuse scrotal edema. Small bilateral hydroceles.    CT Pelvis (12/25) Moderate diffuse subcutaneous/scrotal edema without defined collection or subcutaneous air.    BCX (12/28): Gram variable rods (Blood PCR neg)    Antibiotic Course:  Meropenem ( 11/22-11/29, 11/22-11/29, 12/16-)   Minocycline (12/21-1/1)  Bactrim (11/16-11/24, 12/8-12/11, 12/21-)  Fluconazole (11/16-12/2, 12/12-12/16)   Caspofungin ( 12/6-12/11, 12/17-)  Cefepime (12/10-12/16)   Metronidazole (12/10-12/14)   Ganciclovir (12/7-12/13)   Linezolid (11/23-11/26, 12/6-12/11, 12/28-12/29)   Atovaquone (11/29-12/6)   Zosyn (11/20-11/22,12/6)   Tobramycin (12/6)   Valganciclovir (11/6-12/4)    #Positive Blood Culture (12/23 Clostridium paraputrificum) (12/28: Gram variable rods -Blood PCR neg)  Clostridium paraputrificum is generally penicillin and metronidazole susceptible  Cont Meropenem and Flagyl    #Leukocytosis, Fever, Shock, Transaminitis, Stenotrophomonas Pneumonia  --If further worsening shock overnight would repeat blood cultures and administer Tobramycin 600 mg IV x1 (7 mg/kg based on adjusted body weight).   --CT C/A/P showing Small bilateral pleural effusions with associated compressive atelectasis, increased since prior exam. Mild distention and mural thickening of small bowel of concern for enteritis. Status post liver transplant and colectomy with stable postsurgical changes.  --s/p 10 day course of Minocycline 200 mg IV Q12H (12/21 >1/1)   --Continue Meropenem 1g IV Q12H and flagyl 400mg Q8  --Doubt need for Caspofungin; no growth of fungal organisms  -Decrease immunosuppression if possible     #Liver Transplant Recipient, Prophylactic Antibiotic  --Repeat CMV detectable on 12/31  --Given thrombocytopenia can consider holding Bactrim or now  Cytomegalovirus By PCR: Det <34.5 IU/mL (12.23.24 @ 06:15)  Cytomegalovirus By PCR: 537 IU/mL (12.31.24 @ 13:36)  will need to restart induction therapy with ganciclovir IV adjusted for CVVH dosing        Discussed w/ Dr. Alise Mccabe  ID Fellow

## 2025-01-02 NOTE — ADVANCED PRACTICE NURSE CONSULT - ASSESSMENT
Chart reviewed & events noted. Seen with PT wound care Rico Angeles for routine vac dressing  and complete ostomy pouching system changes. (see PT wound note for details). Son at bedside. Pt in bed, asleep appears comfortable. Complete pouching system changed.  On exam:  Stoma 1 5/8" with known grayish adherent slough remains. Stoma is maroon red in middle, slough continue to loosen, stoma is viable.No active bleeding noted during visit. Stoma is functioning for reddish -brown drainage, Peristomal skin and mucocutaneous junction intact. Noted dried blood vs adherent brown slough at mucocutaneous junction from 6- 9 o'clock, known + creases/skin indents at 3 and 9 o'clock. Dusted the peristomal skin with stoma powder excess removed.  Dab in with Cavilon 3M no sting barrier liquid film.  Re pouched w/ 2 1/4" flat skin barrier.  Bead of stoma paste applied to skin creases to level the surface and at the back of skin barrier near opening to caulk & high output pouch re- attached to bedside drainage bag. Patient  tolerated procedure well. Supplies and pattern  left at the bedside.  Will continue to follow up.

## 2025-01-02 NOTE — PROGRESS NOTE ADULT - SUBJECTIVE AND OBJECTIVE BOX
HISTORY  74y Male    24 HOUR EVENTS:  INTERVAL EVENTS:  - prbc x 1 2/2 circuit clotting  - d/c minocycline  - d/c TEG, start only if bleed occurs  - nichols placed  - Albumin 500cc q8   - Lomotil started  - Midodrine 10mg q8  - repeat BCx      NEURO  Exam: opens eyes intermittlently, does not follow commands  Meds: [id number]    Injectable 25 MICROGram(s) IV Push every 6 hours PRN Breakthrough Pain  oxyCODONE    Solution 5 milliGRAM(s) Oral every 4 hours PRN Severe Pain (7 - 10)  oxyCODONE    Solution 2.5 milliGRAM(s) Oral every 4 hours PRN Moderate Pain (4 - 6)    [x] Adequacy of sedation and pain control has been assessed and adjusted      RESPIRATORY  RR: 21 (01-02-25 @ 01:00) (15 - 35)  SpO2: 100% (01-02-25 @ 01:00) (98% - 100%)  Wt(kg): --  Exam: unlabored, clear to auscultation bilaterally  Mechanical Ventilation: Mode: AC/ CMV (Assist Control/ Continuous Mandatory Ventilation), RR (machine): 14, RR (patient): 24, TV (machine): 450, FiO2: 30, PEEP: 5, ITime: 1, MAP: 9.3, PIP: 20  ABG - ( 02 Jan 2025 00:42 )  pH: 7.38  /  pCO2: 37    /  pO2: 104   / HCO3: 22    / Base Excess: -2.9  /  SaO2: 98.7    Lactate: x                [N/A] Extubation Readiness Assessed  Meds: albuterol/ipratropium for Nebulization 3 milliLiter(s) Nebulizer every 6 hours PRN Shortness of Breath and/or Wheezing  buDESOnide    Inhalation Suspension 0.5 milliGRAM(s) Inhalation every 12 hours        CARDIOVASCULAR  HR: 93 (01-02-25 @ 01:00) (85 - 105)  BP: 104/66 (01-01-25 @ 19:45) (104/66 - 115/58)  BP(mean): 80 (01-01-25 @ 19:45) (80 - 82)  ABP: 120/55 (01-02-25 @ 01:00) (89/48 - 134/69)  ABP(mean): 81 (01-02-25 @ 01:00) (63 - 95)  Wt(kg): --  CVP(cm H2O): --      Exam: regular rate and rhythm  Cardiac Rhythm: sinus  Perfusion     [x]Adequate   [ ]Inadequate  Extremities  [x]Warm         [ ]Cool  Volume Status [ ]Hypervolemic [x]Euvolemic [ ]Hypovolemic  Meds: midodrine 10 milliGRAM(s) Oral every 8 hours  phenylephrine    Infusion 0.1 MICROgram(s)/kG/Min IV Continuous <Continuous>        GI/NUTRITION  Exam: soft, nontender, nondistended  Diet: NPO  Meds: diphenoxylate/atropine 1 Tablet(s) Oral four times a day  pantoprazole  Injectable 40 milliGRAM(s) IV Push every 12 hours  ursodiol Suspension 300 milliGRAM(s) Oral every 12 hours      GENITOURINARY  I&O's Detail    12-31 @ 07:01 - 01-01 @ 07:00  --------------------------------------------------------  IN:    Albumin 5%  - 250 mL: 250 mL    Cryoprecipitate: 150 mL    Enteral Tube Flush: 270 mL    IV PiggyBack: 610 mL    IV PiggyBack: 100 mL    Nepro with Carb Steady: 1285 mL    Phenylephrine: 254 mL    Platelets - Single Donor: 225 mL    Vasopressin: 108 mL  Total IN: 3252 mL    OUT:    Bulb (mL): 380 mL    Bulb (mL): 75 mL    Ileostomy (mL): 2340 mL    Other (mL): 1414 mL    VAC (Vacuum Assisted Closure) System (mL): 0 mL  Total OUT: 4209 mL    Total NET: -957 mL      01-01 @ 07:01  -  01-02 @ 02:01  --------------------------------------------------------  IN:    Albumin 5%  - 500 mL: 1000 mL    Enteral Tube Flush: 330 mL    IV PiggyBack: 710 mL    Nepro with Carb Steady: 990 mL    Phenylephrine: 274.5 mL    PRBCs (Packed Red Blood Cells): 300 mL    Vasopressin: 81 mL  Total IN: 3685.5 mL    OUT:    Bulb (mL): 430 mL    Bulb (mL): 10 mL    Ileostomy (mL): 1750 mL    Indwelling Catheter - Urethral (mL): 145 mL    Other (mL): 1206 mL    VAC (Vacuum Assisted Closure) System (mL): 0 mL  Total OUT: 3541 mL    Total NET: 144.5 mL          01-02    138  |  106  |  21  ----------------------------<  133[H]  4.0   |  19[L]  |  <0.30[L]    Ca    8.0[L]      02 Jan 2025 00:47  Phos  3.0     01-02  Mg     2.4     01-02    TPro  3.8[L]  /  Alb  3.1[L]  /  TBili  22.4[H]  /  DBili  x   /  AST  95[H]  /  ALT  210[H]  /  AlkPhos  106  01-02  Meds: albumin human  5% IVPB 500 milliLiter(s) IV Intermittent every 8 hours        HEMATOLOGIC  Meds:   [x] VTE Prophylaxis                        8.5    4.74  )-----------( 13       ( 02 Jan 2025 00:47 )             24.8     PT/INR - ( 02 Jan 2025 00:52 )   PT: 25.9 sec;   INR: 2.27 ratio         PTT - ( 02 Jan 2025 00:52 )  PTT:75.8 sec      INFECTIOUS DISEASES  WBC Count: 4.74 K/uL (01-02 @ 00:47)  WBC Count: 6.49 K/uL (01-01 @ 17:51)  WBC Count: 6.51 K/uL (01-01 @ 12:26)  WBC Count: 6.29 K/uL (01-01 @ 06:18)    RECENT CULTURES:  Specimen Source: .Blood BLOOD  Date/Time: 12-28 @ 18:10  Culture Results:   Growth in anaerobic bottle: Gram Variable Rods  Direct identification is available within approximately 3-5  hours either by Blood Panel Multiplexed PCR or Direct  MALDI-TOF. Details: [email or web address][!]  Gram Stain:   Growth in anaerobic bottle: Gram Variable Rods[!]  Organism: Blood Culture PCR[!]  Specimen Source: .Blood BLOOD  Date/Time: 12-28 @ 12:15  Culture Results:   No growth at 4 days  Gram Stain: --  Organism: --    Meds: caspofungin IVPB      caspofungin IVPB 50 milliGRAM(s) IV Intermittent every 24 hours  cycloSPORINE  , modified (GENGRAF) Solution 50 milliGRAM(s) Oral <User Schedule>  meropenem  IVPB      meropenem  IVPB 1000 milliGRAM(s) IV Intermittent every 12 hours  metroNIDAZOLE  IVPB 500 milliGRAM(s) IV Intermittent every 8 hours  trimethoprim / sulfamethoxazole IVPB 160 milliGRAM(s) IV Intermittent daily        ENDOCRINE  CAPILLARY BLOOD GLUCOSE      POCT Blood Glucose.: 122 mg/dL (01 Jan 2025 23:59)  POCT Blood Glucose.: 159 mg/dL (01 Jan 2025 17:28)  POCT Blood Glucose.: 165 mg/dL (01 Jan 2025 11:26)    Meds: insulin lispro (ADMELOG) corrective regimen sliding scale   SubCutaneous every 6 hours  methylPREDNISolone sodium succinate Injectable 32 milliGRAM(s) IV Push <User Schedule>  vasopressin Infusion 0.03 Unit(s)/Min IV Continuous <Continuous>        CODE STATUS:

## 2025-01-02 NOTE — PROGRESS NOTE ADULT - SUBJECTIVE AND OBJECTIVE BOX
Follow Up:      Interval History/ROS:Patient is a 74y old  Male who presents with a chief complaint of Liver Transplant (02 Jan 2025 10:34)      REVIEW OF SYSTEMS  Constitutional: No fevers, chills, weight loss or fatigue   Skin: No rash, no phlebitis	  Eyes: No discharge	  ENMT: No sore throat, oral thrush, ulcers or exudate  Respiratory: No cough, no SOB  Cardiovascular:  No chest pain, palpitations or edema   Gastrointestinal: No pain, nausea, vomiting, diarrhea or constipation	  Genitourinary: No dysuria, discharge or flank pain  MSK: No arthralgias or back pain   Neurological: No HA, no weakness, no seizures, no AMS       Allergies  No Known Allergies        ANTIMICROBIALS:    caspofungin IVPB    caspofungin IVPB 50 every 24 hours  meropenem  IVPB    meropenem  IVPB 1000 every 12 hours  metroNIDAZOLE  IVPB 500 every 8 hours  trimethoprim  40 mG/sulfamethoxazole 200 mG Suspension 80 daily      OTHER MEDS: MEDICATIONS  (STANDING):  albuterol/ipratropium for Nebulization 3 every 6 hours PRN  buDESOnide    Inhalation Suspension 0.5 every 12 hours  cycloSPORINE  , modified (GENGRAF) Solution 50 <User Schedule>  diphenoxylate/atropine 2 every 6 hours  insulin lispro (ADMELOG) corrective regimen sliding scale  every 6 hours  methylPREDNISolone sodium succinate Injectable 32 <User Schedule>  midodrine 10 every 8 hours  oxyCODONE    Solution 5 every 4 hours PRN  oxyCODONE    Solution 2.5 every 4 hours PRN  pantoprazole  Injectable 40 every 12 hours  phenylephrine    Infusion 0.1 <Continuous>  ursodiol Suspension 300 every 12 hours  vasopressin Infusion 0.03 <Continuous>      Vital Signs Last 24 Hrs  T(F): 97.7 (01-02-25 @ 07:00), Max: 100.8 (12-29-24 @ 12:00)    Vital Signs Last 24 Hrs  HR: 101 (01-02-25 @ 10:32) (85 - 105)  BP: 119/56 (01-02-25 @ 07:45) (104/66 - 119/56)  RR: 22 (01-02-25 @ 10:30)  SpO2: 100% (01-02-25 @ 10:32) (93% - 100%)  Wt(kg): --    EXAM:  General: Patient in no acute distress   HEENT: NCAT, EOMI, PERRL, no oral lesions  CV: S1+S2, no m/r/g appreciated   Lungs: No respiratory distress, CTAB  Abd: Soft, nontender, no guarding, no rebound tenderness, + bowel sounds   Ext: No cyanosis, no edema  Neuro: Alert and oriented, no focal deficits, CN II-XII grossly intact   Skin: No rash   IV: No phlebitis      Labs:                        7.7    4.42  )-----------( 22       ( 02 Jan 2025 07:02 )             23.1     01-02    138  |  106  |  20  ----------------------------<  138[H]  3.8   |  19[L]  |  <0.30[L]    Ca    8.3[L]      02 Jan 2025 07:01  Phos  2.8     01-02  Mg     2.4     01-02    TPro  3.8[L]  /  Alb  3.3  /  TBili  21.9[H]  /  DBili  x   /  AST  86[H]  /  ALT  192[H]  /  AlkPhos  98  01-02      WBC Trend:  WBC Count: 4.42 (01-02-25 @ 07:02)  WBC Count: 4.74 (01-02-25 @ 00:47)  WBC Count: 6.49 (01-01-25 @ 17:51)  WBC Count: 6.51 (01-01-25 @ 12:26)      Creatine Trend:  Creatinine: <0.30 (01-02)  Creatinine: <0.30 (01-02)  Creatinine: <0.30 (01-01)  Creatinine: <0.30 (01-01)      Liver Biochemical Testing Trend:  Alanine Aminotransferase (ALT/SGPT): 192 *H* (01-02)  Alanine Aminotransferase (ALT/SGPT): 210 *H* (01-02)  Alanine Aminotransferase (ALT/SGPT): 246 *H* (01-01)  Alanine Aminotransferase (ALT/SGPT): 237 *H* (01-01)  Alanine Aminotransferase (ALT/SGPT): 237 *H* (01-01)  Aspartate Aminotransferase (AST/SGOT): 86 (01-02-25 @ 07:01)  Aspartate Aminotransferase (AST/SGOT): 95 (01-02-25 @ 00:47)  Aspartate Aminotransferase (AST/SGOT): 117 (01-01-25 @ 17:51)  Aspartate Aminotransferase (AST/SGOT): 119 (01-01-25 @ 12:26)  Aspartate Aminotransferase (AST/SGOT): 135 (01-01-25 @ 06:18)  Bilirubin Total: 21.9 (01-02)  Bilirubin Total: 22.4 (01-02)  Bilirubin Total: 22.2 (01-01)  Bilirubin Total: 22.0 (01-01)  Bilirubin Total: 21.4 (01-01)      Trend LDH  12-26-24 @ 12:33  766[H]  12-10-24 @ 00:29  231  12-09-24 @ 00:32  241  12-08-24 @ 18:30  288[H]  12-08-24 @ 12:13  375[H]      Urinalysis Basic - ( 02 Jan 2025 07:01 )    Color: x / Appearance: x / SG: x / pH: x  Gluc: 138 mg/dL / Ketone: x  / Bili: x / Urobili: x   Blood: x / Protein: x / Nitrite: x   Leuk Esterase: x / RBC: x / WBC x   Sq Epi: x / Non Sq Epi: x / Bacteria: x        MICROBIOLOGY:  Vancomycin Level, Random: <4.0 (12-28 @ 06:19)  Vancomycin Level, Random: 9.0 (12-27 @ 05:52)  Vancomycin Level, Random: 15.0 (12-26 @ 06:24)  Vancomycin Level, Random: 9.3 (12-25 @ 06:10)  Vancomycin Level, Random: 16.6 (12-24 @ 06:25)  Vancomycin Level, Random: 16.3 (12-23 @ 06:15)  Vancomycin Level, Random: 11.4 (12-22 @ 06:17)  Vancomycin Level, Random: 4.4 (12-21 @ 06:30)  Vancomycin Level, Random: 8.5 (12-20 @ 06:25)    MRSA PCR Result.: NotDetec (11-24-24 @ 10:14)      Culture - Blood (collected 28 Dec 2024 18:10)  Source: .Blood BLOOD  Preliminary Report:    Growth in anaerobic bottle: Gram Variable Rods    Direct identification is available within approximately 3-5    hours either by Blood Panel Multiplexed PCR or Direct    MALDI-TOF. Details: [email or web address]  Organism: Blood Culture PCR  Organism: Blood Culture PCR    Sensitivities:      Method Type: PCR      -  Blood PCR Panel: NEG    Culture - Blood (collected 28 Dec 2024 12:15)  Source: .Blood BLOOD  Preliminary Report:    No growth at 4 days    Culture - Body Fluid with Gram Stain (collected 26 Dec 2024 19:36)  Source: Body Fluid  Final Report:    Rare Enterococcus faecalis  Organism: Enterococcus faecalis  Organism: Enterococcus faecalis    Sensitivities:      Method Type: ESAU      -  Ampicillin: S <=2 Predicts results to ampicillin/sulbactam, amoxacillin-clavulanate and  piperacillin-tazobactam.      -  Vancomycin: S 1      -  Gentamicin synergy: R >500      -  Streptomycin synergy: S <=1000    Culture - Body Fluid with Gram Stain (collected 26 Dec 2024 19:33)  Source: Body Fluid  Final Report:    Rare Enterococcus faecalis  Organism: Enterococcus faecalis  Organism: Enterococcus faecalis    Sensitivities:      Method Type: ESAU      -  Ampicillin: S <=2 Predicts results to ampicillin/sulbactam, amoxacillin-clavulanate and  piperacillin-tazobactam.      -  Vancomycin: S 1      -  Gentamicin synergy: R >500      -  Streptomycin synergy: S <=1000    Culture - Blood (collected 24 Dec 2024 03:30)  Source: .Blood BLOOD  Final Report:    No growth at 5 days    Culture - Bronchial (collected 23 Dec 2024 16:37)  Source: Combi-Cath  Final Report:    Commensal charity consistent with body site    Culture - Blood (collected 23 Dec 2024 16:03)  Source: .Blood BLOOD  Final Report:    Growth in anaerobic bottle: Clostridium paraputrificum "Susceptibilities    not performed"    Direct identification is available within approximately 3-5    hours either by Blood Panel Multiplexed PCR or Direct    MALDI-TOF. Details: [email or web address]  Organism: Blood Culture PCR  Organism: Blood Culture PCR    Sensitivities:      Method Type: PCR      -  Blood PCR Panel: NEG    Culture - Body Fluid with Gram Stain (collected 20 Dec 2024 18:23)  Source: Abdominal Fl  Final Report:    Rare Enterococcus faecalis  Organism: Enterococcus faecalis  Organism: Enterococcus faecalis    Sensitivities:      Method Type: ESAU      -  Ampicillin: S <=2 Predicts results to ampicillin/sulbactam, amoxacillin-clavulanate and  piperacillin-tazobactam.      -  Vancomycin: S 1    Culture - Sputum (collected 19 Dec 2024 14:31)  Source: Trach Asp Tracheal Aspirate  Final Report:    Moderate Stenotrophomonas maltophilia    Commensal charity consistent with body site  Organism: Stenotrophomonas maltophilia  Organism: Stenotrophomonas maltophilia    Sensitivities:      Method Type: KB      -  Minocycline: S  Organism: Stenotrophomonas maltophilia    Sensitivities:      Method Type: ESAU      -  Levofloxacin: S <=0.5      -  Trimethoprim/Sulfamethoxazole: S <=0.5/9.5    Culture - Blood (collected 19 Dec 2024 03:28)  Source: .Blood BLOOD  Final Report:    No growth at 5 days      HIV-1/2 Combo Result: Nonreact (11-15-24 @ 00:41)      HIV-1 RNA Quantitative, Viral Load: NOT DET. copies/mL (12-18-24 @ 22:14)  HIV-1 Viral Load Result: NOT DET. (12-18-24 @ 22:14)                  COVID-19 PCR: NotDetec (11-15-24 @ 01:34)      Procalcitonin: 1.78 (01-01)  Procalcitonin: 1.43 (12-30)  Procalcitonin: 2.20 (12-27)  Procalcitonin: 2.49 (12-26)    C-Reactive Protein: 46 (01-01)  C-Reactive Protein: 42 (12-30)  C-Reactive Protein: 51 (12-27)        Lactate Dehydrogenase, Serum: 766 (12-26)        Blood Gas Arterial, Lactate: 3.2 (01-02 @ 06:07)  Blood Gas Arterial, Lactate: 3.5 (01-02 @ 00:42)  Blood Gas Arterial, Lactate: 3.8 (01-01 @ 17:47)  Blood Gas Arterial, Lactate: 2.8 (01-01 @ 11:54)      RADIOLOGY:  imaging below personally reviewed   Follow Up:      Interval History/ROS:Patient is a 74y old  Male who presents with a chief complaint of Liver Transplant (02 Jan 2025 10:34)    Pt intubated with minimal mental status       Allergies  No Known Allergies        ANTIMICROBIALS:    caspofungin IVPB    caspofungin IVPB 50 every 24 hours  meropenem  IVPB    meropenem  IVPB 1000 every 12 hours  metroNIDAZOLE  IVPB 500 every 8 hours  trimethoprim  40 mG/sulfamethoxazole 200 mG Suspension 80 daily      OTHER MEDS: MEDICATIONS  (STANDING):  albuterol/ipratropium for Nebulization 3 every 6 hours PRN  buDESOnide    Inhalation Suspension 0.5 every 12 hours  cycloSPORINE  , modified (GENGRAF) Solution 50 <User Schedule>  diphenoxylate/atropine 2 every 6 hours  insulin lispro (ADMELOG) corrective regimen sliding scale  every 6 hours  methylPREDNISolone sodium succinate Injectable 32 <User Schedule>  midodrine 10 every 8 hours  oxyCODONE    Solution 5 every 4 hours PRN  oxyCODONE    Solution 2.5 every 4 hours PRN  pantoprazole  Injectable 40 every 12 hours  phenylephrine    Infusion 0.1 <Continuous>  ursodiol Suspension 300 every 12 hours  vasopressin Infusion 0.03 <Continuous>      Vital Signs Last 24 Hrs  T(F): 97.7 (01-02-25 @ 07:00), Max: 100.8 (12-29-24 @ 12:00)    Vital Signs Last 24 Hrs  HR: 101 (01-02-25 @ 10:32) (85 - 105)  BP: 119/56 (01-02-25 @ 07:45) (104/66 - 119/56)  RR: 22 (01-02-25 @ 10:30)  SpO2: 100% (01-02-25 @ 10:32) (93% - 100%)  Wt(kg): --    EXAM:  General: Patient in no acute distress, intubated   CV: S1+S2, no m/r/g appreciated   Lungs: No respiratory distress, CTAB  Abd: Soft, nontender, no guarding, no rebound tenderness   Ext: No cyanosis, no edema  Neuro: Intubated    Skin: No rash   IV: No phlebitis      Labs:                        7.7    4.42  )-----------( 22       ( 02 Jan 2025 07:02 )             23.1     01-02    138  |  106  |  20  ----------------------------<  138[H]  3.8   |  19[L]  |  <0.30[L]    Ca    8.3[L]      02 Jan 2025 07:01  Phos  2.8     01-02  Mg     2.4     01-02    TPro  3.8[L]  /  Alb  3.3  /  TBili  21.9[H]  /  DBili  x   /  AST  86[H]  /  ALT  192[H]  /  AlkPhos  98  01-02      WBC Trend:  WBC Count: 4.42 (01-02-25 @ 07:02)  WBC Count: 4.74 (01-02-25 @ 00:47)  WBC Count: 6.49 (01-01-25 @ 17:51)  WBC Count: 6.51 (01-01-25 @ 12:26)      Creatine Trend:  Creatinine: <0.30 (01-02)  Creatinine: <0.30 (01-02)  Creatinine: <0.30 (01-01)  Creatinine: <0.30 (01-01)      Liver Biochemical Testing Trend:  Alanine Aminotransferase (ALT/SGPT): 192 *H* (01-02)  Alanine Aminotransferase (ALT/SGPT): 210 *H* (01-02)  Alanine Aminotransferase (ALT/SGPT): 246 *H* (01-01)  Alanine Aminotransferase (ALT/SGPT): 237 *H* (01-01)  Alanine Aminotransferase (ALT/SGPT): 237 *H* (01-01)  Aspartate Aminotransferase (AST/SGOT): 86 (01-02-25 @ 07:01)  Aspartate Aminotransferase (AST/SGOT): 95 (01-02-25 @ 00:47)  Aspartate Aminotransferase (AST/SGOT): 117 (01-01-25 @ 17:51)  Aspartate Aminotransferase (AST/SGOT): 119 (01-01-25 @ 12:26)  Aspartate Aminotransferase (AST/SGOT): 135 (01-01-25 @ 06:18)  Bilirubin Total: 21.9 (01-02)  Bilirubin Total: 22.4 (01-02)  Bilirubin Total: 22.2 (01-01)  Bilirubin Total: 22.0 (01-01)  Bilirubin Total: 21.4 (01-01)      Trend LDH  12-26-24 @ 12:33  766[H]  12-10-24 @ 00:29  231  12-09-24 @ 00:32  241  12-08-24 @ 18:30  288[H]  12-08-24 @ 12:13  375[H]      Urinalysis Basic - ( 02 Jan 2025 07:01 )    Color: x / Appearance: x / SG: x / pH: x  Gluc: 138 mg/dL / Ketone: x  / Bili: x / Urobili: x   Blood: x / Protein: x / Nitrite: x   Leuk Esterase: x / RBC: x / WBC x   Sq Epi: x / Non Sq Epi: x / Bacteria: x        MICROBIOLOGY:  Vancomycin Level, Random: <4.0 (12-28 @ 06:19)  Vancomycin Level, Random: 9.0 (12-27 @ 05:52)  Vancomycin Level, Random: 15.0 (12-26 @ 06:24)  Vancomycin Level, Random: 9.3 (12-25 @ 06:10)  Vancomycin Level, Random: 16.6 (12-24 @ 06:25)  Vancomycin Level, Random: 16.3 (12-23 @ 06:15)  Vancomycin Level, Random: 11.4 (12-22 @ 06:17)  Vancomycin Level, Random: 4.4 (12-21 @ 06:30)  Vancomycin Level, Random: 8.5 (12-20 @ 06:25)    MRSA PCR Result.: NotDetec (11-24-24 @ 10:14)      Culture - Blood (collected 28 Dec 2024 18:10)  Source: .Blood BLOOD  Preliminary Report:    Growth in anaerobic bottle: Gram Variable Rods    Direct identification is available within approximately 3-5    hours either by Blood Panel Multiplexed PCR or Direct    MALDI-TOF. Details: [email or web address]  Organism: Blood Culture PCR  Organism: Blood Culture PCR    Sensitivities:      Method Type: PCR      -  Blood PCR Panel: NEG    Culture - Blood (collected 28 Dec 2024 12:15)  Source: .Blood BLOOD  Preliminary Report:    No growth at 4 days    Culture - Body Fluid with Gram Stain (collected 26 Dec 2024 19:36)  Source: Body Fluid  Final Report:    Rare Enterococcus faecalis  Organism: Enterococcus faecalis  Organism: Enterococcus faecalis    Sensitivities:      Method Type: ESAU      -  Ampicillin: S <=2 Predicts results to ampicillin/sulbactam, amoxacillin-clavulanate and  piperacillin-tazobactam.      -  Vancomycin: S 1      -  Gentamicin synergy: R >500      -  Streptomycin synergy: S <=1000    Culture - Body Fluid with Gram Stain (collected 26 Dec 2024 19:33)  Source: Body Fluid  Final Report:    Rare Enterococcus faecalis  Organism: Enterococcus faecalis  Organism: Enterococcus faecalis    Sensitivities:      Method Type: ESAU      -  Ampicillin: S <=2 Predicts results to ampicillin/sulbactam, amoxacillin-clavulanate and  piperacillin-tazobactam.      -  Vancomycin: S 1      -  Gentamicin synergy: R >500      -  Streptomycin synergy: S <=1000    Culture - Blood (collected 24 Dec 2024 03:30)  Source: .Blood BLOOD  Final Report:    No growth at 5 days    Culture - Bronchial (collected 23 Dec 2024 16:37)  Source: Combi-Cath  Final Report:    Commensal charity consistent with body site    Culture - Blood (collected 23 Dec 2024 16:03)  Source: .Blood BLOOD  Final Report:    Growth in anaerobic bottle: Clostridium paraputrificum "Susceptibilities    not performed"    Direct identification is available within approximately 3-5    hours either by Blood Panel Multiplexed PCR or Direct    MALDI-TOF. Details: [email or web address]  Organism: Blood Culture PCR  Organism: Blood Culture PCR    Sensitivities:      Method Type: PCR      -  Blood PCR Panel: NEG    Culture - Body Fluid with Gram Stain (collected 20 Dec 2024 18:23)  Source: Abdominal Fl  Final Report:    Rare Enterococcus faecalis  Organism: Enterococcus faecalis  Organism: Enterococcus faecalis    Sensitivities:      Method Type: ESAU      -  Ampicillin: S <=2 Predicts results to ampicillin/sulbactam, amoxacillin-clavulanate and  piperacillin-tazobactam.      -  Vancomycin: S 1    Culture - Sputum (collected 19 Dec 2024 14:31)  Source: Trach Asp Tracheal Aspirate  Final Report:    Moderate Stenotrophomonas maltophilia    Commensal charity consistent with body site  Organism: Stenotrophomonas maltophilia  Organism: Stenotrophomonas maltophilia    Sensitivities:      Method Type: KB      -  Minocycline: S  Organism: Stenotrophomonas maltophilia    Sensitivities:      Method Type: ESAU      -  Levofloxacin: S <=0.5      -  Trimethoprim/Sulfamethoxazole: S <=0.5/9.5    Culture - Blood (collected 19 Dec 2024 03:28)  Source: .Blood BLOOD  Final Report:    No growth at 5 days      HIV-1/2 Combo Result: Nonreact (11-15-24 @ 00:41)      HIV-1 RNA Quantitative, Viral Load: NOT DET. copies/mL (12-18-24 @ 22:14)  HIV-1 Viral Load Result: NOT DET. (12-18-24 @ 22:14)                  COVID-19 PCR: NotDetec (11-15-24 @ 01:34)      Procalcitonin: 1.78 (01-01)  Procalcitonin: 1.43 (12-30)  Procalcitonin: 2.20 (12-27)  Procalcitonin: 2.49 (12-26)    C-Reactive Protein: 46 (01-01)  C-Reactive Protein: 42 (12-30)  C-Reactive Protein: 51 (12-27)        Lactate Dehydrogenase, Serum: 766 (12-26)        Blood Gas Arterial, Lactate: 3.2 (01-02 @ 06:07)  Blood Gas Arterial, Lactate: 3.5 (01-02 @ 00:42)  Blood Gas Arterial, Lactate: 3.8 (01-01 @ 17:47)  Blood Gas Arterial, Lactate: 2.8 (01-01 @ 11:54)      RADIOLOGY:  imaging below personally reviewed

## 2025-01-02 NOTE — PROGRESS NOTE ADULT - ASSESSMENT
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 Post-op course c/b worsening mental status and acute hypoxic respiratory failure requiring multiple intubations/extubations, currently extubated (as of 11/26/24) and colonic ileus s/p several rounds of Relistor and Neostigmine with NGT and rectal tube currently in place now with septic shock due to ischemia bowel s/p total colectomy with ostomy creation.  Transplant nephrology consulted for metabolic acidosis and MORAIMA.    1. s/p OLT 11/15/24 with oliguric MORAIMA in setting of septic shock.  Likely hemodynamically mediated in setting of cardiogenic and septic shock on multiple vasopressors and prior IABP.   - CT AP non-con: Bilateral renal cysts including cysts with peripheral calcification in the left kidney.  - Initiated on CRRT 12/7/24- 12/15/24 at 1AM stopped. s/p IHD 12/18/24 however required levophed.   - Now back on CRRT, remains oliguric with 150 cc UOP in 24 hour. Pt. remains on vasopressor support with vaso, phenyl, and midodrine 10mg TID.   - Will continue CRRT for now   - Dose medication per CRRT. Monitor BP and labs.     If you have any questions, please feel free to contact me:  Magaly Tello MD PGY-4  Nephrology Fellow  [employer] Teams (Preferred)/ Pager [phone]   (After 5pm or on weekends please page the on-call fellow)

## 2025-01-02 NOTE — PROGRESS NOTE ADULT - SUBJECTIVE AND OBJECTIVE BOX
HPI:  Patient seen and examined at bedside in SICU.  Wounds on buttock and scortal necrosis examined on rounds.    Review Of Systems:           Respiratory: No shortness of breath, cough, or wheezing  Cardiovascular: No chest pain or palpitations  10 point review of systems is otherwise negative except as mentioned above        Medications:  albumin human  5% IVPB 500 milliLiter(s) IV Intermittent every 6 hours  albuterol/ipratropium for Nebulization 3 milliLiter(s) Nebulizer every 6 hours PRN  buDESOnide    Inhalation Suspension 0.5 milliGRAM(s) Inhalation every 12 hours  caspofungin IVPB 50 milliGRAM(s) IV Intermittent every 24 hours  caspofungin IVPB      chlorhexidine 0.12% Liquid 15 milliLiter(s) Oral Mucosa every 12 hours  chlorhexidine 2% Cloths 1 Application(s) Topical <User Schedule>  CRRT Treatment    <Continuous>  cycloSPORINE  , modified (GENGRAF) Solution 75 milliGRAM(s) Oral <User Schedule>  cycloSPORINE  , modified (GENGRAF) Solution 50 milliGRAM(s) Oral <User Schedule>  diphenoxylate/atropine 2 Tablet(s) Oral every 6 hours  FIRST- Mouthwash  BLM 10 milliLiter(s) Swish and Spit every 8 hours PRN  ganciclovir IVPB 250 milliGRAM(s) IV Intermittent every 24 hours  insulin lispro (ADMELOG) corrective regimen sliding scale   SubCutaneous every 6 hours  meropenem  IVPB 1000 milliGRAM(s) IV Intermittent every 8 hours  methylPREDNISolone sodium succinate Injectable 32 milliGRAM(s) IV Push <User Schedule>  metroNIDAZOLE  IVPB 500 milliGRAM(s) IV Intermittent every 8 hours  midodrine 10 milliGRAM(s) Oral every 8 hours  mupirocin 2% Ointment 1 Application(s) Topical every 12 hours  nystatin Powder 1 Application(s) Topical every 12 hours  oxyCODONE    Solution 5 milliGRAM(s) Oral every 4 hours PRN  oxyCODONE    Solution 2.5 milliGRAM(s) Oral every 4 hours PRN  pantoprazole  Injectable 40 milliGRAM(s) IV Push every 12 hours  phenylephrine    Infusion 0.1 MICROgram(s)/kG/Min IV Continuous <Continuous>  Phoxillum Filtration BK 4 / 2.5 5000 milliLiter(s) CRRT <Continuous>  Phoxillum Filtration BK 4 / 2.5 5000 milliLiter(s) CRRT <Continuous>  potassium phosphate IVPB 15 milliMole(s) IV Intermittent once  PrismaSATE Dialysate BGK 4 / 2.5 5000 milliLiter(s) CRRT <Continuous>  trimethoprim  40 mG/sulfamethoxazole 200 mG Suspension 80 milliGRAM(s) Enteral Tube daily  ursodiol Suspension 300 milliGRAM(s) Oral every 12 hours  vasopressin Infusion 0.03 Unit(s)/Min IV Continuous <Continuous>    PAST MEDICAL & SURGICAL HISTORY:  Diabetes      Transaminitis      Paroxysmal atrial fibrillation      Depression      BPH (benign prostatic hyperplasia)      Hypertension      Chronic atrial fibrillation      Coronary artery disease      Hepatocellular carcinoma      DM (diabetes mellitus)      HTN (hypertension)      Paroxysmal atrial fibrillation      Cirrhosis      HCC (hepatocellular carcinoma)      History of BPH      History of laparoscopic cholecystectomy      History of lumbar laminectomy      H/O prior ablation treatment      H/O percutaneous left heart catheterization        Vitals:  T(C): 36 (01-02-25 @ 19:00), Max: 36.8 (01-02-25 @ 03:00)  HR: 98 (01-02-25 @ 19:00) (85 - 104)  BP: 119/56 (01-02-25 @ 07:45) (104/66 - 119/56)  BP(mean): 81 (01-02-25 @ 07:45) (80 - 81)  RR: 25 (01-02-25 @ 19:00) (16 - 31)  SpO2: 100% (01-02-25 @ 19:00) (93% - 100%)  Wt(kg): --  Daily     Daily   I&O's Summary    01 Jan 2025 07:01  -  02 Jan 2025 07:00  --------------------------------------------------------  IN: 5081 mL / OUT: 4678 mL / NET: 403 mL    02 Jan 2025 07:01  -  02 Jan 2025 19:09  --------------------------------------------------------  IN: 3472.5 mL / OUT: 1738 mL / NET: 1734.5 mL        Physical Exam:  Appearance: Critically ill  Eyes: PERRL, EOMI, pink conjunctiva  HENT: +Trach, +NGT  Cardiovascular: RRR, S1, S2  Respiratory: Clear to auscultation bilaterally  Gastrointestinal: soft, non-tender, non-distended with normal bowel sounds  Musculoskeletal: No clubbing; no joint deformity   Neurologic: Non-focal  Skin: sacral decubitus ulcer and scrotal necrosis noted                          8.8    3.94  )-----------( 33       ( 02 Jan 2025 18:09 )             25.7     01-02    139  |  107  |  21  ----------------------------<  145[H]  3.8   |  19[L]  |  <0.30[L]    Ca    8.3[L]      02 Jan 2025 17:42  Phos  2.7     01-02  Mg     2.4     01-02    TPro  3.7[L]  /  Alb  3.2[L]  /  TBili  24.4[H]  /  DBili  x   /  AST  86[H]  /  ALT  201[H]  /  AlkPhos  100  01-02    PT/INR - ( 02 Jan 2025 17:42 )   PT: 28.7 sec;   INR: 2.54 ratio         PTT - ( 02 Jan 2025 17:42 )  PTT:72.0 sec        Cardiovascular Diagnostic Testing:  ECG: sinus rhythm    Echo: < from: Intra-Operative Transesophageal Echo W or WO Ultrasound Enhancing Agent (11.15.24 @ 07:46) >  --------------------------------------------------------------------------------  PRE-BYPASS FINDINGS     Left Ventricle:  Left ventricular ejection fraction is estimated at 60 to 65%. Normal left ventricularwall thickness. The left ventricular systolic function is normal There are no regional wall motion abnormalities seen.     Right Ventricle:  The right ventricular cavity is normal in size, with normal wall thickness and right ventricular systolic function is normal. Right ventricular systolic function is normal.     Left Atrium:  The left atrium is normal in size. No thrombus visualized in left atrial appendage.     Right Atrium:  The right atrium is normal in size. The right atrium is normal in size.     Interatrial Septum:  No PFO visualized with color flow doppler.     Aortic Valve:  The aortic valve appears trileaflet. There is no aortic valve stenosis. There is trace aortic regurgitation.     Mitral Valve:  There is no mitral valve stenosis. There is no mitral valve stenosis. There is trace mitral regurgitation.     Tricuspid Valve:  There is no evidence of tricuspid stenosis. There is trace tricuspid regurgitation.     Pericardium:  No pericardial effusion seen.     Post-Bypass:  S/P OLT. Under current loading conditions, hyperdynamic left ventricular systolic function, EF: 70-75% by visual estimate, no RWMA. Normal right ventricular systolic function. All other findings unchanged. Probe removed atraumatically, no blood. The patient tolerated the procedure well and without complications. Permanent recorded images are stored in the medical record.     Electronically signed by James Villareal on 11/15/2024 at 2:18:16 PM         *** Final ***    < end of copied text >      < from: TTE Limited W or WO Ultrasound Enhancing Agent (12.11.24 @ 09:28) >  _______________________________________________________________________________________     CONCLUSIONS:      1. Endocardial visualization enhanced with Definity (Ultrasound Enhancing Agent).   2. Left ventricular systolic function is normal with an ejection fraction visually estimated at 55 to 60 %.   3. Enlarged right ventricular cavity size and mildly reduced right ventricular systolic function.   4. Device lead is visualized in the right heart.    ________________________________________________________________________________________    < end of copied text >      Stress Testing:     Cath: 4/24/2024, patient had his LHC repeated with Ben Villareal MD at [facility].  Cath showed multivessel coronary artery disease, but the lesions previously noted were FFR negative.  He continues to have MIRTA 3 flow throughout.    Interpretation of Telemetry: Sinus     Imaging:

## 2025-01-02 NOTE — PROGRESS NOTE ADULT - SUBJECTIVE AND OBJECTIVE BOX
Transplant Surgery - Multidisciplinary Rounds  --------------------------------------------------------------  OLT   11/15/2024        POD#48  Colectomy 12/7/2024   POD#27  IABP 12/7 removed 12/10  Tracheostomy 12/17/2024    Present:   Patient seen and examined with multidisciplinary Transplant team including Surgeon: Dr. Vora, JAZZ Fisher , Hepatologist: Dr. Conteh and bedside RN in AM rounds.   Disciplines not in attendance will be notified of the plan.     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     s/p OLT 11/15 with post op course c/b:  Hypoxia: Reintubated 11/20, extubated 11/24->reintubated 11/24, extubated 11/26, reintubated 12/7, trached 12/17  Ileus   A fib  AMS/Seizure   L brachial DVT  Neutropenia  E coli Bacteremia (12/6)  s/p Coloctomy 12/7.   IABP 12/7, removed 12/10  Ec Faecalis UTI (12/16)  Stenotrophamonas in trach aspirate (12/19)  UGIB 12/26    Interval/Overnight Events:   -Afebrile, on kassandra and vaso   -on CRRT net even, anuric   - nichols replaced by uro  - 1u prbc for CRRT clot  - repeat culture 1/2 + for gram variable rods, repeat cultures sent     Immunosuppression:  - Cyclo per level, MMF HELD, Solu 32  -ongoing monitoring for signs of rejection      TX DATA HERE       ROS: Unable to assess patient is lethargic, s/p tracheostomy    PHYSICAL EXAM:   Constitutional: trach to vent, awake  Eyes:  PERRLA  ENMT: nc/at, no thrush   Neck: supple, trach   Respiratory: CTA B/L  Cardiovascular: RRR  Gastrointestinal: incision clean/dry/intact + Ostomy pink output in bag, wound vac, ALYSSA x2 SS fluid  Genitourinary: +scrotal edema w/ large fluid collection; black discoloration   Extremities: SCD's in place and working bilaterally, + LE edema  Neurological: trach to vent    Skin: no rashes, ulcerations, lesions  Transplant Surgery - Multidisciplinary Progress Note  --------------------------------------------------------------  OLT   11/15/2024         Colectomy 12/7/2024     IABP 12/7 removed 12/10  Tracheostomy 12/17/2024    Present:   Patient seen and examined with multidisciplinary Transplant team including Surgeon: Dr. Hutchinson, Dr. Sorto, JAZZ Fisher/NP Jasmine, Hepatologist: Dr. Ponce/Jyothi, Pharmacist: Ralph and bedside RN in AM rounds.   Disciplines not in attendance will be notified of the plan.     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     s/p OLT 11/15 with post op course c/b:  Hypoxia: Reintubated 11/20, extubated 11/24->reintubated 11/24, extubated 11/26, reintubated 12/7, trached 12/17  Ileus   A fib  AMS/Seizure   L brachial DVT  Neutropenia  E coli Bacteremia (12/6)  s/p Colectomy 12/7.   IABP 12/7, removed 12/10  Ec Faecalis UTI (12/16)  Stenotrophamonas in trach aspirate (12/19)  Clostridium in blood 12/23  UGIB 12/26    Interval/Overnight Events:   -Afebrile, on kassandra   -on CRRT net even, anuric   -coagulopathy corrected by SICU    Immunosuppression:  - Cyclo per level, MMF HELD, Solu 32  -ongoing monitoring for signs of rejection        MEDICATIONS  (STANDING):  albumin human  5% IVPB 500 milliLiter(s) IV Intermittent every 6 hours  albumin human  5% IVPB 250 milliLiter(s) IV Intermittent once  albumin human  5% IVPB 250 milliLiter(s) IV Intermittent once  albumin human  5% IVPB 250 milliLiter(s) IV Intermittent once  albumin human  5% IVPB 250 milliLiter(s) IV Intermittent once  buDESOnide    Inhalation Suspension 0.5 milliGRAM(s) Inhalation every 12 hours  caspofungin IVPB 50 milliGRAM(s) IV Intermittent every 24 hours  caspofungin IVPB      chlorhexidine 0.12% Liquid 15 milliLiter(s) Oral Mucosa every 12 hours  chlorhexidine 2% Cloths 1 Application(s) Topical <User Schedule>  CRRT Treatment    <Continuous>  cycloSPORINE  , modified (GENGRAF) Solution 75 milliGRAM(s) Oral <User Schedule>  cycloSPORINE  , modified (GENGRAF) Solution 50 milliGRAM(s) Oral <User Schedule>  diphenoxylate/atropine 2 Tablet(s) Oral every 6 hours  insulin lispro (ADMELOG) corrective regimen sliding scale   SubCutaneous every 6 hours  meropenem  IVPB 1000 milliGRAM(s) IV Intermittent every 8 hours  methylPREDNISolone sodium succinate Injectable 32 milliGRAM(s) IV Push <User Schedule>  metroNIDAZOLE  IVPB 500 milliGRAM(s) IV Intermittent every 8 hours  midodrine 10 milliGRAM(s) Oral every 8 hours  mupirocin 2% Ointment 1 Application(s) Topical every 12 hours  nystatin Powder 1 Application(s) Topical every 12 hours  pantoprazole  Injectable 40 milliGRAM(s) IV Push every 12 hours  phenylephrine    Infusion 0.1 MICROgram(s)/kG/Min (3.73 mL/Hr) IV Continuous <Continuous>  Phoxillum Filtration BK 4 / 2.5 5000 milliLiter(s) (1250 mL/Hr) CRRT <Continuous>  Phoxillum Filtration BK 4 / 2.5 5000 milliLiter(s) (250 mL/Hr) CRRT <Continuous>  PrismaSATE Dialysate BGK 4 / 2.5 5000 milliLiter(s) (1500 mL/Hr) CRRT <Continuous>  trimethoprim  40 mG/sulfamethoxazole 200 mG Suspension 80 milliGRAM(s) Enteral Tube daily  ursodiol Suspension 300 milliGRAM(s) Oral every 12 hours  vasopressin Infusion 0.03 Unit(s)/Min (4.5 mL/Hr) IV Continuous <Continuous>    MEDICATIONS  (PRN):  albuterol/ipratropium for Nebulization 3 milliLiter(s) Nebulizer every 6 hours PRN Shortness of Breath and/or Wheezing  FIRST- Mouthwash  BLM 10 milliLiter(s) Swish and Spit every 8 hours PRN Mouth Care  oxyCODONE    Solution 5 milliGRAM(s) Oral every 4 hours PRN Severe Pain (7 - 10)  oxyCODONE    Solution 2.5 milliGRAM(s) Oral every 4 hours PRN Moderate Pain (4 - 6)      PAST MEDICAL & SURGICAL HISTORY:  Diabetes      Transaminitis      Paroxysmal atrial fibrillation      Depression      BPH (benign prostatic hyperplasia)      Hypertension      Chronic atrial fibrillation      Coronary artery disease      Hepatocellular carcinoma      DM (diabetes mellitus)      HTN (hypertension)      Paroxysmal atrial fibrillation      Cirrhosis      HCC (hepatocellular carcinoma)      History of BPH      History of laparoscopic cholecystectomy      History of lumbar laminectomy      H/O prior ablation treatment      H/O percutaneous left heart catheterization          Vital Signs Last 24 Hrs  T(C): 36.4 (02 Jan 2025 15:00), Max: 36.8 (02 Jan 2025 03:00)  T(F): 97.5 (02 Jan 2025 15:00), Max: 98.2 (02 Jan 2025 03:00)  HR: 101 (02 Jan 2025 16:45) (85 - 105)  BP: 119/56 (02 Jan 2025 07:45) (104/66 - 119/56)  BP(mean): 81 (02 Jan 2025 07:45) (80 - 81)  RR: 22 (02 Jan 2025 16:45) (16 - 35)  SpO2: 100% (02 Jan 2025 16:45) (93% - 100%)    Parameters below as of 02 Jan 2025 07:00  Patient On (Oxygen Delivery Method): ventilator    O2 Concentration (%): 30    I&O's Summary    01 Jan 2025 07:01  -  02 Jan 2025 07:00  --------------------------------------------------------  IN: 5081 mL / OUT: 4678 mL / NET: 403 mL    02 Jan 2025 07:01  -  02 Jan 2025 17:01  --------------------------------------------------------  IN: 2184.4 mL / OUT: 1009 mL / NET: 1175.4 mL                              9.4    4.06  )-----------( 13       ( 02 Jan 2025 13:01 )             27.1     01-02    138  |  105  |  22  ----------------------------<  156[H]  3.9   |  19[L]  |  <0.30[L]    Ca    8.0[L]      02 Jan 2025 13:01  Phos  2.8     01-02  Mg     2.4     01-02    TPro  3.5[L]  /  Alb  3.1[L]  /  TBili  24.4[H]  /  DBili  x   /  AST  89[H]  /  ALT  206[H]  /  AlkPhos  104  01-02          Culture - Blood (collected 12-28-24 @ 18:10)  Source: .Blood BLOOD  Gram Stain (01-01-25 @ 20:10):    Growth in anaerobic bottle: Gram Variable Rods  Preliminary Report (01-01-25 @ 20:10):    Growth in anaerobic bottle: Gram Variable Rods    Direct identification is available within approximately 3-5    hours either by Blood Panel Multiplexed PCR or Direct    MALDI-TOF. Details: [email or web address]  Organism: Blood Culture PCR (01-01-25 @ 22:12)  Organism: Blood Culture PCR (01-01-25 @ 22:12)    Culture - Blood (collected 12-28-24 @ 12:15)  Source: .Blood BLOOD  Preliminary Report (01-01-25 @ 18:01):    No growth at 4 days    Culture - Body Fluid with Gram Stain (collected 12-26-24 @ 19:36)  Source: Body Fluid  Gram Stain (12-28-24 @ 20:11):    polymorphonuclear leukocytes seen    No organisms seen    by cytocentrifuge  Final Report (12-31-24 @ 22:42):    Rare Enterococcus faecalis  Organism: Enterococcus faecalis (12-31-24 @ 22:42)  Organism: Enterococcus faecalis (12-31-24 @ 22:42)    Culture - Body Fluid with Gram Stain (collected 12-26-24 @ 19:33)  Source: Body Fluid  Gram Stain (12-27-24 @ 07:00):    polymorphonuclear leukocytes seen    Gram positive cocci in pairs seen    by cytocentrifuge  Final Report (01-01-25 @ 07:44):    Rare Enterococcus faecalis  Organism: Enterococcus faecalis (01-01-25 @ 07:44)  Organism: Enterococcus faecalis (01-01-25 @ 07:44)                  ROS: Unable to assess patient is lethargic, s/p tracheostomy    PHYSICAL EXAM:   Constitutional: trach to vent, awake  Eyes:  PERRLA  ENMT: nc/at, no thrush   Neck: supple, trach   Respiratory: CTA B/L  Cardiovascular: RRR  Gastrointestinal: incision clean/dry/intact + Ostomy pink output in bag, wound vac, ALYSSA x2 SS fluid  Genitourinary: +scrotal edema w/ large fluid collection; black discoloration   Extremities: SCD's in place and working bilaterally, + LE edema  Neurological: trach to vent    Skin: no rashes, ulcerations, lesions

## 2025-01-02 NOTE — PROGRESS NOTE ADULT - ATTENDING COMMENTS
Patient seen and examined    Case discussed with DR Mccabe    I agree with her interval history exam and plans as noted above and the note has been changed to reflect my assessment    Patient without any evidence of clinical improvement  Remains with trach/vent and poor mental status    will restart CMV therapy with IV ganciclovir and monitor platelets    Wes Carrero MD  Can be called via Teams  After 5pm/weekends [phone]

## 2025-01-02 NOTE — PROGRESS NOTE ADULT - SUBJECTIVE AND OBJECTIVE BOX
[facility] DIVISION OF KIDNEY DISEASES AND HYPERTENSION -- FOLLOW UP NOTE  --------------------------------------------------------------------------------  Chief Complaint: s/p OLT with anuric MORAIMA.     24 hour events/subjective: Pt. seen and examined at bedside earlier today in SICU. Pt. remains on CRRT on trach/peg. Castro catheter replaced yesterday for scrotum healing.         PAST HISTORY  --------------------------------------------------------------------------------  No significant changes to PMH, PSH, FHx, SHx, unless otherwise noted    ALLERGIES & MEDICATIONS  --------------------------------------------------------------------------------  Allergies    No Known Allergies    Intolerances      Standing Inpatient Medications  albumin human  5% IVPB 500 milliLiter(s) IV Intermittent every 6 hours  buDESOnide    Inhalation Suspension 0.5 milliGRAM(s) Inhalation every 12 hours  caspofungin IVPB      caspofungin IVPB 50 milliGRAM(s) IV Intermittent every 24 hours  chlorhexidine 0.12% Liquid 15 milliLiter(s) Oral Mucosa every 12 hours  chlorhexidine 2% Cloths 1 Application(s) Topical <User Schedule>  CRRT Treatment    <Continuous>  cycloSPORINE  , modified (GENGRAF) Solution 50 milliGRAM(s) Oral <User Schedule>  diphenoxylate/atropine 2 Tablet(s) Oral every 6 hours  insulin lispro (ADMELOG) corrective regimen sliding scale   SubCutaneous every 6 hours  meropenem  IVPB      meropenem  IVPB 1000 milliGRAM(s) IV Intermittent every 12 hours  methylPREDNISolone sodium succinate Injectable 32 milliGRAM(s) IV Push <User Schedule>  metroNIDAZOLE  IVPB 500 milliGRAM(s) IV Intermittent every 8 hours  midodrine 10 milliGRAM(s) Oral every 8 hours  mupirocin 2% Ointment 1 Application(s) Topical every 12 hours  nystatin Powder 1 Application(s) Topical every 12 hours  pantoprazole  Injectable 40 milliGRAM(s) IV Push every 12 hours  phenylephrine    Infusion 0.1 MICROgram(s)/kG/Min IV Continuous <Continuous>  Phoxillum Filtration BK 4 / 2.5 5000 milliLiter(s) CRRT <Continuous>  Phoxillum Filtration BK 4 / 2.5 5000 milliLiter(s) CRRT <Continuous>  PrismaSATE Dialysate BGK 4 / 2.5 5000 milliLiter(s) CRRT <Continuous>  trimethoprim  40 mG/sulfamethoxazole 200 mG Suspension 80 milliGRAM(s) Enteral Tube daily  ursodiol Suspension 300 milliGRAM(s) Oral every 12 hours  vasopressin Infusion 0.03 Unit(s)/Min IV Continuous <Continuous>    PRN Inpatient Medications  albuterol/ipratropium for Nebulization 3 milliLiter(s) Nebulizer every 6 hours PRN  FIRST- Mouthwash  BLM 10 milliLiter(s) Swish and Spit every 8 hours PRN  oxyCODONE    Solution 5 milliGRAM(s) Oral every 4 hours PRN  oxyCODONE    Solution 2.5 milliGRAM(s) Oral every 4 hours PRN      REVIEW OF SYSTEMS  --------------------------------------------------------------------------------  Unable to obtain ROS due to current clinical status.     VITALS/PHYSICAL EXAM  --------------------------------------------------------------------------------  T(C): 36.5 (01-02-25 @ 07:00), Max: 36.8 (01-01-25 @ 15:00)  HR: 101 (01-02-25 @ 10:32) (85 - 105)  BP: 119/56 (01-02-25 @ 07:45) (104/66 - 119/56)  RR: 22 (01-02-25 @ 10:30) (15 - 35)  SpO2: 100% (01-02-25 @ 10:32) (93% - 100%)  Wt(kg): --        01-01-25 @ 07:01  -  01-02-25 @ 07:00  --------------------------------------------------------  IN: 5081 mL / OUT: 4678 mL / NET: 403 mL    01-02-25 @ 07:01  -  01-02-25 @ 10:34  --------------------------------------------------------  IN: 606.6 mL / OUT: 288 mL / NET: 318.6 mL        Physical Exam:  Gen: Unresponsive, on vent via tracheostomy  HEENT: Icterus present, +NGT  Abdomen: + ileostomy with liquid brown stool, VAC dressing present   MSK: Minimal edema   Skin: Superficial skin ulceration b/l thighs   Right IJ temporary dialysis catheter     LABS/STUDIES  --------------------------------------------------------------------------------              7.7    4.42  >-----------<  22       [01-02-25 @ 07:02]              23.1     138  |  106  |  20  ----------------------------<  138      [01-02-25 @ 07:01]  3.8   |  19  |  <0.30        Ca     8.3     [01-02-25 @ 07:01]      Mg     2.4     [01-02-25 @ 07:01]      Phos  2.8     [01-02-25 @ 07:01]    TPro  3.8  /  Alb  3.3  /  TBili  21.9  /  DBili  x   /  AST  86  /  ALT  192  /  AlkPhos  98  [01-02-25 @ 07:01]    PT/INR: PT 26.4 , INR 2.34       [01-02-25 @ 07:04]  PTT: 71.1       [01-02-25 @ 07:04]      Creatinine Trend:  SCr <0.30 [01-02 @ 07:01]  SCr <0.30 [01-02 @ 00:47]  SCr <0.30 [01-01 @ 17:51]  SCr <0.30 [01-01 @ 12:26]  SCr <0.30 [01-01 @ 06:18]              Urinalysis - [01-02-25 @ 07:01]      Color  / Appearance  / SG  / pH       Gluc 138 / Ketone   / Bili  / Urobili        Blood  / Protein  / Leuk Est  / Nitrite       RBC  / WBC  / Hyaline  / Gran  / Sq Epi  / Non Sq Epi  / Bacteria       Vitamin D (25OH) <6.0      [11-21-24 @ 08:32]  TSH 0.68      [12-12-24 @ 12:27]  Lipid: chol 114, , HDL 47, LDL --      [04-24-24 @ 06:48]          IMAGING/RADIOLOGY: Reviewed.

## 2025-01-02 NOTE — PROGRESS NOTE ADULT - ASSESSMENT
74 male w/ a PMHx of HTN, DM, AF, BPH, MASH cirrhosis and HCC s/p OLT on 11/15. Case was uneventful but post-op course has been prolonged and c/b delirium, aspiration, multiple episodes of acute hypoxic respiratory failure requiring reintubation from 11/20-11/24 & 11/24-11/26, AF w/ RVR, ileus requiring NGT, distended colon requiring rectal tube, dysphagia, malnutrition, hypernatremia, fevers, pancytopenia, poorly controlled glucoses, and left brachial VTE. Patient went into severe refractory septic shock on 12/6 w/ blood cultures positive for E. coli s/p s/p ex lap, total abd colectomy, end ileostomy, 3 intentionally retained laparotomy pads for bleeding, Abthera, IABP placement w/ admission to CTICU, Patient required inotropes, Jacqui, and CRRT post-op. s/p closure 12/9. IABP removed 12/10 and transferred back to SICU 12/13. SICU course c/b narrow complex arrythmia w/ ECG showing new ST elevations concerning for posterior wall MI vs vasospasms, cards consulted and started on heparin gtt for empiric ACS tx which was c/b GIB then transitioned to argatroban c/b bleed. TTE revealed LVOT obstruction &     Wound Consult requested to assist w/ management of  Sacral/ buttocks unstageable pressure injury   Moisture Associated Dermatitis scrotum/ Buttocks/ Thighs  Scrotal Edema  Ascites       Buttocks/ Sacrum/ Scrotum / thighs  TRIAD BID and prn soiling    Scrotal elevation w/ folded attends pad  surgical wound management as per surgical team    Continue w/ attends under pads and Pericare w/ Castro & Ostomy care as per protocol  Continue turning and positioning w/ offloading assistive devices as per protocol  consider NOEMI/PVR's if in line with GOC   Waffle Cushion to chair when oob to chair  Continue w/ alternating air with low air loss surface pressure redistribution bed surface   Moisturize intact skin w/ SWEEN cream BID  Appreciate nutrition consult - pt w/ Increased protein-energy needs and Acute Severe protein calorie malnutrition       multivitamin, vitamin C to assist w/ wound healing       diet pending as pt post op- Consider CAMI to promote wound healing when post op care allows  Care as per CTS, will follow w/ you  Upon discharge f/u as outpatient at [facility] [address] [facility] [phone]  Seen &  D/w attng, team & RN  Thank you for this consult  Nights/ Weekends/ Holidays please call:  General Surgery Consult pager ([phone]) for emergencies  Wound PT for multilayer leg wrapping or VAC issues (x [phone])  I spent  35 minutes face to face w/ this pt of which more than 50% of the time was spent counseling & coordinating care of this pt.      74 male w/ a PMHx of HTN, DM, AF, BPH, MASH cirrhosis and HCC s/p OLT on 11/15. Case was uneventful but post-op course has been prolonged and c/b delirium, aspiration, multiple episodes of acute hypoxic respiratory failure requiring reintubation from 11/20-11/24 & 11/24-11/26, AF w/ RVR, ileus requiring NGT, distended colon requiring rectal tube, dysphagia, malnutrition, hypernatremia, fevers, pancytopenia, poorly controlled glucoses, and left brachial VTE. Patient went into severe refractory septic shock on 12/6 w/ blood cultures positive for E. coli s/p s/p ex lap, total abd colectomy, end ileostomy, 3 intentionally retained laparotomy pads for bleeding, Abthera, IABP placement w/ admission to CTICU, Patient required inotropes, Jacqui, and CRRT post-op. s/p closure 12/9. IABP removed 12/10 and transferred back to SICU 12/13. SICU course c/b narrow complex arrythmia w/ ECG showing new ST elevations concerning for posterior wall MI vs vasospasms, cards consulted and started on heparin gtt for empiric ACS tx which was c/b GIB then transitioned to argatroban c/b bleed. TTE revealed LVOT obstruction & TODD    Wound Consult requested to assist w/ management of  Sacral/ buttocks unstageable pressure injury   Moisture Associated Dermatitis scrotum/ Buttocks/ Thighs  Scrotal Edema  Ascites       Buttocks/ Sacrum/ Scrotum / thighs  TRIAD BID and prn soiling    Scrotal elevation continue  Bilateral groin - interDry Ag QD  surgical wound management as per surgical team    Continue w/ attends under pads and Pericare w/ Castro & Ostomy care as per protocol  Continue turning and positioning w/ offloading assistive devices as per protocol  consider NOEMI/PVR's if in line with GOC   Waffle Cushion to chair when oob to chair  Continue w/ alternating air with low air loss surface pressure redistribution bed surface   Moisturize intact skin w/ SWEEN cream BID  Appreciate nutrition consult - pt w/ Increased protein-energy needs and Acute Severe protein calorie malnutrition       multivitamin, vitamin C to assist w/ wound healing       diet pending as pt post op- Consider CAMI to promote wound healing when post op care allows  Abx as per [facility] / ID  Care as per SICU, will follow w/ you  Upon discharge f/u as outpatient at Wound Center [address] [facility] [phone]  Seen w/ RN and  D/w attng, team   Thank you for this consult  Nights/ Weekends/ Holidays please call:  General Surgery Consult pager ([phone]) for emergencies  Wound PT for multilayer leg wrapping or VAC issues (x [phone])  I spent 50minutes face to face w/ this pt of which more than 50% of the time was spent counseling & coordinating care of this pt.

## 2025-01-02 NOTE — ADVANCED PRACTICE NURSE CONSULT - RECOMMEDATIONS
Will recommend:  1. Monitor output.  2. Empty pouch when 1/3-1/2 full   3. Change pouching system every 3-4 days & prn leakage  4. Contact ostomy specialists if questions, concerns/issues .  5. Supplies: [place] 2 1/4" Ceraplus flat skin barrier (#03768), Lianne 2 1/4" drainable pouch (#25451); Accessory products:  stoma paste #[id number], stoma powder (#6627) & Cavilon No sting barrier film wipe (#5150)  Will f/u for ostomy education when appropriate. Pt remains acute requiring SICU care.

## 2025-01-02 NOTE — PROGRESS NOTE ADULT - ASSESSMENT
74 male w/ a PMHx of HTN, DM, AF, BPH, MASH cirrhosis and HCC s/p OLT on 11/15. Case was uneventful but post-op course has been prolonged and c/b delirium, aspiration, multiple episodes of acute hypoxic respiratory failure requiring reintubation from 11/20-11/24 & 11/24-11/26, AF w/ RVR, ileus requiring NGT, distended colon requiring rectal tube, dysphagia, malnutrition, hypernatremia, fevers, pancytopenia, poorly controlled glucoses, and left brachial VTE. Patient went into severe refractory septic shock on 12/6 w/ blood cultures positive for E. coli s/p s/p ex lap, total abd colectomy, end ileostomy, 3 intentionally retained laparotomy pads for bleeding, Abthera, IABP placement w/ admission to CTICU, Patient required inotropes, Jacqui, and CRRT post-op. s/p closure 12/9. IABP removed 12/10 and transferred back to SICU 12/13. SICU course c/b narrow complex arrythmia w/ ECG showing new ST elevations concerning for posterior wall MI vs vasospasms, cards consulted and started on heparin gtt for empiric ACS tx which was c/b GIB then transitioned to argatroban c/b bleed. TTE revealed LVOT obstruction & TODD.       PLAN:  Neuro:  - Pain: PRN oxy solution  - Opens eyes intermittently, Not following commands, off sedation  - CT head 11/19, 11/21, 11/25, 11/29, 12/5 negative  - EEG: Mild diffuse cerebral dysfunction that is not specific in etiology. No epileptic discharges recorded. No seizures recorded.  - Holding home xanax, Abilify, Zoloft, Remeron, Lexapro  - CT head 12/20 showing age-indeterminate infarct, f/u Neurology recs  - No need for MRI     Resp:  - S/p bedside tracheostomy 12/17  - PRVC 14/450/5/30  - trach collar daily, full support overnight  - c/w inhaled bronchodilator    CV:  - Aldo, vaso  - home metoprolol 12.5 q8hr held for pressor req  - IABP removed 12/10  - TTE 12/6 w/ LVOT obstruction & TODD  - TTE 12/11 shows EF of 55-60%  - 10mg midodrine q8    GI:  - trend LFTs  - NPO, NGT (can be to gravity tube feeds or to gravity, no suction)   - protonix now BID  - monitor ALYSSA output    /Renal:  - CRRT restarted 12/23  - Monitor BUN/Cr, I&Os, trend UOP  - Scrotal US 12/15 -> edema, no abscess -> elevate  - repeat scrotal doppler for concern of ischemia > low flow state, low concern for abscess  - Bladder scan q12    Heme:  - trend H/H, PLTs, coags  - hep gtt and argatroban gtt held i/s/o bleed  - s/p desmopression 30 x 1 on 12/26    ID:  - ABX: caspo, bactrim, jeniffer, flagyl  - 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

## 2025-01-02 NOTE — PROGRESS NOTE ADULT - SUBJECTIVE AND OBJECTIVE BOX
[facility]-- WOUND TEAM -- FOLLOW UP NOTE  --------------------------------------------------------------------------------    24 hour events/subjective:    afebrile  intubated/ remains in ICU  tolerating tf  (+)nichols      Diet:  Diet, NPO with Tube Feed:   Tube Feeding Modality: Nasogastric  Nepro with Carb Steady (NEPRORTH)  Total Volume for 24 Hours (mL): 1320  Continuous  Starting Tube Feed Rate mL per Hour: 55     Every 4 hours  Until Goal Tube Feed Rate (mL per Hour): 55  Tube Feed Duration (in Hours): 24  Tube Feed Start Time: 10:00  Banatrol TF     Qty per Day:  3 (12-31-24 @ 13:48)      ROS: pt unable to offer    ALLERGIES & MEDICATIONS  --------------------------------------------------------------------------------    No Known Allergies        STANDING INPATIENT MEDICATIONS  albumin human  5% IVPB 500 milliLiter(s) IV Intermittent every 6 hours  albumin human  5% IVPB 250 milliLiter(s) IV Intermittent once  albumin human  5% IVPB 250 milliLiter(s) IV Intermittent once  albumin human  5% IVPB 250 milliLiter(s) IV Intermittent once  albumin human  5% IVPB 250 milliLiter(s) IV Intermittent once  buDESOnide    Inhalation Suspension 0.5 milliGRAM(s) Inhalation every 12 hours  caspofungin IVPB 50 milliGRAM(s) IV Intermittent every 24 hours      chlorhexidine 0.12% Liquid 15 milliLiter(s) Oral Mucosa every 12 hours  chlorhexidine 2% Cloths 1 Application(s) Topical <User Schedule>  CRRT Treatment    <Continuous>  cycloSPORINE  , modified (GENGRAF) Solution 75 milliGRAM(s) Oral <User Schedule>  cycloSPORINE  , modified (GENGRAF) Solution 50 milliGRAM(s) Oral <User Schedule>  diphenoxylate/atropine 2 Tablet(s) Oral every 6 hours  ganciclovir IVPB 250 milliGRAM(s) IV Intermittent every 24 hours  insulin lispro (ADMELOG) corrective regimen sliding scale   SubCutaneous every 6 hours  meropenem  IVPB 1000 milliGRAM(s) IV Intermittent every 8 hours  methylPREDNISolone sodium succinate Injectable 32 milliGRAM(s) IV Push <User Schedule>  metroNIDAZOLE  IVPB 500 milliGRAM(s) IV Intermittent every 8 hours  midodrine 10 milliGRAM(s) Oral every 8 hours  mupirocin 2% Ointment 1 Application(s) Topical every 12 hours  nystatin Powder 1 Application(s) Topical every 12 hours  pantoprazole  Injectable 40 milliGRAM(s) IV Push every 12 hours  phenylephrine    Infusion 0.1 MICROgram(s)/kG/Min IV Continuous <Continuous>  Phoxillum Filtration BK 4 / 2.5 5000 milliLiter(s) CRRT <Continuous>  Phoxillum Filtration BK 4 / 2.5 5000 milliLiter(s) CRRT <Continuous>  PrismaSATE Dialysate BGK 4 / 2.5 5000 milliLiter(s) CRRT <Continuous>  trimethoprim 40 mG/sulfamethoxazole 200 mG Suspension 80 milliGRAM(s) Enteral Tube daily  ursodiol Suspension 300 milliGRAM(s) Oral every 12 hours  vasopressin Infusion 0.03 Unit(s)/Min IV Continuous <Continuous>      PRN INPATIENT MEDICATION  albuterol/ipratropium for Nebulization 3 milliLiter(s) Nebulizer every 6 hours PRN  FIRST- Mouthwash  BLM 10 milliLiter(s) Swish and Spit every 8 hours PRN  oxyCODONE Solution 5 milliGRAM(s) Oral every 4 hours PRN  oxyCODONE Solution 2.5 milliGRAM(s) Oral every 4 hours PRN        VITALS/PHYSICAL EXAM  --------------------------------------------------------------------------------  T(C): 36.4 (01-02-25 @ 15:00), Max: 36.8 (01-02-25 @ 03:00)  HR: 101 (01-02-25 @ 16:45) (85 - 105)  BP: 119/56 (01-02-25 @ 07:45) (104/66 - 119/56)  RR: 22 (01-02-25 @ 16:45) (16 - 35)  SpO2: 100% (01-02-25 @ 16:45) (93% - 100%)  Wt(kg): --    Guarded but stable, WN/ WG/ WD  intubated, sedated, pressors  Progressa bed  HEENT: NC/AT, sclera clear, mucosa moist, throat clear, trachea midline, neck supple  Respiratory: Nonlabored w/ equal chest rise, vent  Gastrointestinal: soft NT/ND (+)stoma moist, viable, functioning     abdominal incision dressing lifting - staples intact     clear serosanguinous drainage noted     inferior lateral abdominal wall ruptured blister w/ denuded skin      no odor, tenderness, increased warmth, erythema, crepitus, induration, or fluctuance  : (+) nichols, scrotal edema     Scrotum into thighs pink denuded skin         w/  clear yellow serous weeping   no blistering, odor, tenderness, increased warmth, erythema, crepitus, induration, or fluctuance  Neurology: sedated  Psych: difficult to assess  Musculoskeletal:  pROM, no deformities/ contractures  Vascular: BLE edema equal, no palpable DP pulses  Skin: moist w/ good turgor, anasarca   Sacrum/bilateral buttocks unstageable pressure injury   denuded / partial thickness skin loss w/ soft black eschar w/ hyperpigmented skin in periwound   6.0cm x 4.0cm x 0.1cm, scant serosanguinous drainage, periwound erythema   scant yellow clear weeping noted on attends pad  no blistering, odor, tenderness, increased warmth, erythema, crepitus, induration, or fluctuance        LABS/ CULTURES/ RADIOLOGY:              9.4    4.06  >-----------<  13       [01-02-25 @ 13:01]              27.1     138  |  105  |  22  ----------------------------<  156      [01-02-25 @ 13:01]  3.9   |  19  |  <0.30        Ca     8.0     [01-02-25 @ 13:01]      Mg     2.4     [01-02-25 @ 13:01]      Phos  2.8     [01-02-25 @ 13:01]    TPro  3.5  /  Alb  3.1  /  TBili  24.4  /  DBili  x   /  AST  89  /  ALT  206  /  AlkPhos  104  [01-02-25 @ 13:01]    PT/INR: PT 28.1 , INR 2.49       [01-02-25 @ 13:01]  PTT: 75.4       [01-02-25 @ 13:01]      CAPILLARY BLOOD GLUCOSE  POCT Blood Glucose.: 153 mg/dL (02 Jan 2025 12:25)  POCT Blood Glucose.: 137 mg/dL (02 Jan 2025 06:24)  POCT Blood Glucose.: 122 mg/dL (01 Jan 2025 23:59)  POCT Blood Glucose.: 159 mg/dL (01 Jan 2025 17:28)      Culture - Blood (collected 12-28-24 @ 18:10)  Source: .Blood BLOOD  Gram Stain (01-01-25 @ 20:10):    Growth in anaerobic bottle: Gram Variable Rods  Preliminary Report (01-01-25 @ 20:10):    Growth in anaerobic bottle: Gram Variable Rods    Direct identification is available within approximately 3-5    hours either by Blood Panel Multiplexed PCR or Direct    MALDI-TOF. Details: [email or web address]  Organism: Blood Culture PCR (01-01-25 @ 22:12)  Organism: Blood Culture PCR (01-01-25 @ 22:12)      Method Type: PCR      -  Blood PCR Panel: NEG    Culture - Blood (collected 12-28-24 @ 12:15)  Source: .Blood BLOOD  Preliminary Report (01-01-25 @ 18:01):    No growth at 4 days   [facility]-- WOUND TEAM -- FOLLOW UP NOTE  --------------------------------------------------------------------------------    24 hour events/subjective:    afebrile  trach collar/ remains in ICU  tolerating tf  (+)nichols  family at bedside, all questions answered to their expressed understanding      Diet:  Diet, NPO with Tube Feed:   Tube Feeding Modality: Nasogastric  Nepro with Carb Steady (NEPRORTH)  Total Volume for 24 Hours (mL): 1320  Continuous  Starting Tube Feed Rate mL per Hour: 55     Every 4 hours  Until Goal Tube Feed Rate (mL per Hour): 55  Tube Feed Duration (in Hours): 24  Tube Feed Start Time: 10:00  Banatrol TF     Qty per Day:  3 (12-31-24 @ 13:48)      ROS: pt unable to offer    ALLERGIES & MEDICATIONS  --------------------------------------------------------------------------------    No Known Allergies        STANDING INPATIENT MEDICATIONS  albumin human  5% IVPB 500 milliLiter(s) IV Intermittent every 6 hours  albumin human  5% IVPB 250 milliLiter(s) IV Intermittent once  albumin human  5% IVPB 250 milliLiter(s) IV Intermittent once  albumin human  5% IVPB 250 milliLiter(s) IV Intermittent once  albumin human  5% IVPB 250 milliLiter(s) IV Intermittent once  buDESOnide    Inhalation Suspension 0.5 milliGRAM(s) Inhalation every 12 hours  caspofungin IVPB 50 milliGRAM(s) IV Intermittent every 24 hours      chlorhexidine 0.12% Liquid 15 milliLiter(s) Oral Mucosa every 12 hours  chlorhexidine 2% Cloths 1 Application(s) Topical <User Schedule>  CRRT Treatment    <Continuous>  cycloSPORINE  , modified (GENGRAF) Solution 75 milliGRAM(s) Oral <User Schedule>  cycloSPORINE  , modified (GENGRAF) Solution 50 milliGRAM(s) Oral <User Schedule>  diphenoxylate/atropine 2 Tablet(s) Oral every 6 hours  ganciclovir IVPB 250 milliGRAM(s) IV Intermittent every 24 hours  insulin lispro (ADMELOG) corrective regimen sliding scale   SubCutaneous every 6 hours  meropenem  IVPB 1000 milliGRAM(s) IV Intermittent every 8 hours  methylPREDNISolone sodium succinate Injectable 32 milliGRAM(s) IV Push <User Schedule>  metroNIDAZOLE  IVPB 500 milliGRAM(s) IV Intermittent every 8 hours  midodrine 10 milliGRAM(s) Oral every 8 hours  mupirocin 2% Ointment 1 Application(s) Topical every 12 hours  nystatin Powder 1 Application(s) Topical every 12 hours  pantoprazole  Injectable 40 milliGRAM(s) IV Push every 12 hours  phenylephrine    Infusion 0.1 MICROgram(s)/kG/Min IV Continuous <Continuous>  Phoxillum Filtration BK 4 / 2.5 5000 milliLiter(s) CRRT <Continuous>  Phoxillum Filtration BK 4 / 2.5 5000 milliLiter(s) CRRT <Continuous>  PrismaSATE Dialysate BGK 4 / 2.5 5000 milliLiter(s) CRRT <Continuous>  trimethoprim 40 mG/sulfamethoxazole 200 mG Suspension 80 milliGRAM(s) Enteral Tube daily  ursodiol Suspension 300 milliGRAM(s) Oral every 12 hours  vasopressin Infusion 0.03 Unit(s)/Min IV Continuous <Continuous>      PRN INPATIENT MEDICATION  albuterol/ipratropium for Nebulization 3 milliLiter(s) Nebulizer every 6 hours PRN  FIRST- Mouthwash  BLM 10 milliLiter(s) Swish and Spit every 8 hours PRN  oxyCODONE Solution 5 milliGRAM(s) Oral every 4 hours PRN  oxyCODONE Solution 2.5 milliGRAM(s) Oral every 4 hours PRN        VITALS/PHYSICAL EXAM  --------------------------------------------------------------------------------  T(C): 36.4 (01-02-25 @ 15:00), Max: 36.8 (01-02-25 @ 03:00)  HR: 101 (01-02-25 @ 16:45) (85 - 105)  BP: 119/56 (01-02-25 @ 07:45) (104/66 - 119/56)  RR: 22 (01-02-25 @ 16:45) (16 - 35)  SpO2: 100% (01-02-25 @ 16:45) (93% - 100%)  Wt(kg): --    Guarded but stable, WN/ WG/ WD, pressors  Progressa bed  HEENT: NC/AT, jaundice, mucosa moist, throat clear, trach collar  Respiratory: Nonlabored w/ equal chest rise  Gastrointestinal: soft NT/ND (+)stoma moist, viable, functioning      VAC in place w/ good seal as per surgery  : (+) nichols, scrotal edema much improved     Scrotum grey dusky skin w/o drainage or blistering   no blistering, odor, tenderness, increased warmth, erythema, crepitus, induration, or fluctuance  Neurology: sedated  Psych: difficult to assess  Musculoskeletal:  pROM, no deformities/ contractures  Vascular: BLE edema equal, no palpable DP pulses  Skin: moist w/ good turgor, anasarca   Bilateral thighs w/ areas of grey slough and pink denuded skin         w/  clear yellow serous weeping    Sacrum/bilateral buttocks unstageable pressure injury    dry adherent black eschar w/ hyperpigmented skin in periwound    10cm x 8.0cm x 0.1cm,     no drainage or blistering  no blistering, odor, tenderness, increased warmth, erythema, crepitus, induration, or fluctuance        LABS/ CULTURES/ RADIOLOGY:              9.4    4.06  >-----------<  13       [01-02-25 @ 13:01]              27.1     138  |  105  |  22  ----------------------------<  156      [01-02-25 @ 13:01]  3.9   |  19  |  <0.30        Ca     8.0     [01-02-25 @ 13:01]      Mg     2.4     [01-02-25 @ 13:01]      Phos  2.8     [01-02-25 @ 13:01]    TPro  3.5  /  Alb  3.1  /  TBili  24.4  /  DBili  x   /  AST  89  /  ALT  206  /  AlkPhos  104  [01-02-25 @ 13:01]    PT/INR: PT 28.1 , INR 2.49       [01-02-25 @ 13:01]  PTT: 75.4       [01-02-25 @ 13:01]      CAPILLARY BLOOD GLUCOSE  POCT Blood Glucose.: 153 mg/dL (02 Jan 2025 12:25)  POCT Blood Glucose.: 137 mg/dL (02 Jan 2025 06:24)  POCT Blood Glucose.: 122 mg/dL (01 Jan 2025 23:59)  POCT Blood Glucose.: 159 mg/dL (01 Jan 2025 17:28)      Culture - Blood (collected 12-28-24 @ 18:10)  Source: .Blood BLOOD  Gram Stain (01-01-25 @ 20:10):    Growth in anaerobic bottle: Gram Variable Rods  Preliminary Report (01-01-25 @ 20:10):    Growth in anaerobic bottle: Gram Variable Rods    Direct identification is available within approximately 3-5    hours either by Blood Panel Multiplexed PCR or Direct    MALDI-TOF. Details: [email or web address]  Organism: Blood Culture PCR (01-01-25 @ 22:12)  Organism: Blood Culture PCR (01-01-25 @ 22:12)      Method Type: PCR      -  Blood PCR Panel: NEG    Culture - Blood (collected 12-28-24 @ 12:15)  Source: .Blood BLOOD  Preliminary Report (01-01-25 @ 18:01):    No growth at 4 days

## 2025-01-02 NOTE — PROGRESS NOTE ADULT - ATTENDING COMMENTS
S/p OLT course complicated by septic shock and ischemic colitis s/p total colectomy and ileostomy.  S/p Tracheostomy, remains on vent   Remains on pressors vaso and kassandra, anuric, on CRRT, net even.   Continue CRRT

## 2025-01-02 NOTE — PROGRESS NOTE ADULT - ASSESSMENT
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   s/p OLT 11/15 with complicated post op course    [] Septic shock/Cardiogenic shock  [] s/p Colectomy 12/7   [] RTOR for closure and Ileostomy creation   [] IAPB 12/7- removed 12/10  - E coli Bacteremia (12/6)  Received Tobra x 1 12/6 .   - EC faecalis asc fluid ( 12/20) (12/26)  - Ec faecalis UTI (12/16)  - Tx ID following - Caspo/Bactrim/Flagyl   - Amikacin x1 12/26  - Vanco x1 12/26, 12/28 -> Linezolid added  - Neutropenia: received Neupogen 480mcg x2  - MORAIMA: CRRT started 12/7, stopped 12/15, resumed CRRT 12/23  - AMS; CT Head neg  - Hold diuretics   - 12/23 BCX: GPR  - 12/23 CT chest/Abd/pelvis: GGO, splenic infarcts, ileus  - repeat cx 12/28 + for Gram variable rods, repeat cx sent 1/1  - S/P tracheotomy 12/17  - UGIB s/p EGD showing generalized oozing, coagulopathy       - given 2u FFP, 2u cryo, 2u PLT (12/26)       - receive 2uFFP, 2u cryo, 2u PLT, 3 uPRBC 12/27       - Protonix ggt       - TF at goal    [] s/p OLT POD #48  - LFT's downtrending from 12/25, Liver US: tortuous common hepatic artery with elevated peak systolic velocities up to 635 cm/s, not noted on prior study. growing perihepatic fluid collection  - repeat liver US unchanged  - Diet: increase TFs as needed  - Pain management: avoid narcotics  - Strict I&Os, wound vac placed 12/10   - US: L brachial DVT (holding daap)  - wound vac exchange for ileostomy (12/13)  - HIT panel neg (12/14)  - ursodiol 300mgBID   - repeat CT CAP    [ ] Immunosuppression  - Tacrolimus stopped 11/18 in setting of AMS/Seizure, Started Cyclo 12/17  - Cyclo per level, MMF HELD, Solumedrol 32 mg daily  - PPx: Gancyclovir (HELD) in setting of thrombocytopenia; repeat CMV PCR pending     [] lleus   -@ home on Linzess  - imaging with distended colon (likely opioid induced)  - s/p Neostigmine x 2  - s/p Relistor, last dose 12/1  - s/p colectomy with end ileostomy  (see above)  - ileostomy with >1L output, consider adding bulking agents    [] CMV viremia  - d/c gancylovir due to thrombocytopenia  - CMV PCR (12/11 and 12/16): neg, positive CMVPCR on 12/23 repeat CMV pending from 12/24    [ ] AMS  - Reintubated 11/20 Aspiration/hypoxia, extubated 11/24->[id number]--> extubated 11/26 -> [id number] -> tracheostomy 12/17 -> trach collar trials starting 12/29  - EEG 11/30 negative, Neurology following  - BH following   - off tacro  - on keppra  -CT Head No Cont (12/20): Age-indeterminate posterior left frontal lobe infarct.   -CT Head (12/25): Evolving subacute infarct in the left supramarginal gyrus  -repeat CTH     [ ] HTN/ pAFib  [ ] coronary vasospasm vs posterior wall MI  - EKG 12/24 c/f ST depression, rising troponins: 1800s  - d/c argatroban and aspirin due to bleeding  - Heparin gtt 12/19-21  - Cards- plan for PCI prior to DC   - Off Amiodarone, off dobutamine  - Off metoprolol for soft bp.  - Dr. 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   s/p OLT 11/15 with complicated post op course    [] Septic shock/Cardiogenic shock  [] s/p Colectomy 12/7   [] RTOR for closure and Ileostomy creation   [] IAPB 12/7- removed 12/10  - E coli Bacteremia (12/6)  Received Tobra x 1 12/6 .   - EC faecalis asc fluid ( 12/20) (12/26)  - Ec faecalis UTI (12/16)  - Tx ID following - on Christopher/Caspo.Flagyl/Bactrim DS  - Neutropenia: received Neupogen 480mcg x2  - MORAIMA: CRRT started 12/7, stopped 12/15, resumed CRRT 12/23  - 12/23 BCX: Clostridium  - 12/23 CT chest/Abd/pelvis: GGO, splenic infarcts, ileus  - 1/1 CT CAP: small pleural effusions, enteritis, rest ok  - repeat cx 12/28 + for Gram variable rods, repeat cx sent 1/1  - S/P tracheotomy 12/17  - UGIB s/p EGD showing generalized oozing, coagulopathy       - Protonix BID       - TF at goal    [] s/p OLT  - stable graft function, recovering shock  - repeat liver US unchanged  - Diet: TFs to goal  - Pain management: avoid narcotics  - Strict I&Os, wound vac placed 12/10   - US: L brachial DVT (holding A/C)  - wound vac exchange for ileostomy (12/13)  - HIT panel neg (12/14)  - ursodiol 300mgBID     [ ] Immunosuppression  - Tacrolimus stopped 11/18 in setting of AMS/Seizure, Started Cyclo 12/17  - Cyclo per level, MMF HELD, Solumedrol 32 mg daily    [] lleus -- resolving  -@ home on Linzess  - imaging with distended colon, improving, (likely opioid induced)  - s/p Neostigmine x 2  - s/p Relistor, last dose 12/1  - s/p colectomy with end ileostomy  (see above)  - ileostomy with >1L output, lomotil as needed    [] CMV viremia  - CMV PCR (12/11 and 12/16): neg, positive CMV PCR on 12/23  - PPx: Gancyclovir (HELD) in setting of thrombocytopenia initially due to low PLT  - CMV viral load 537 12/31, will attempt Ganciclovir again     [ ] AMS  - Reintubated 11/20 Aspiration/hypoxia, extubated 11/24->[id number]/24--> extubated 11/26 -> [id number]/7 -> tracheostomy 12/17 -> trach collar trials starting 12/29  - EEG 11/30 negative, Neurology following  - BH following   - off tacro  - on keppra  -CT Head No Cont (12/20): Age-indeterminate posterior left frontal lobe infarct.   -CT Head (12/25): Evolving subacute infarct in the left supramarginal gyrus  -repeat CTH ok    [ ] HTN/ pAFib  [ ] coronary vasospasm vs posterior wall MI  - EKG 12/24 c/f ST depression, rising troponins: 1800s  - d/c argatroban and aspirin due to bleeding  - Heparin gtt 12/19-21  - Cards- plan for PCI prior to DC   - Off Amiodarone, off dobutamine  - Off metoprolol for soft bp.  - Dr. Quintanilla following    [ ] DM  - per SICU     []Scrotal abscess   - US (12/12): 2.1 x0.9 x 3.2 cm focal, right testicle- possible abscess (12/12)  - Urology recs: CT scrotum review no debridement required  - Urology recs Derm consult for guidance on wound care   - 12/23 US doppler scrotum: no arterial flow, limited venous flow, bl hydrocele  - 12/15 CT CAP no acute changes   - Elevate scrotum w/ support Urology signed off 12/29

## 2025-01-02 NOTE — PROGRESS NOTE ADULT - NS ATTEND AMEND GEN_ALL_CORE FT
Gram variable rods --- CT planned today. ID following.  Tb/INR remain elevated  On stable vaspressors.  TFs at goal.  Immuno: cyclo 50/25, solumedrol

## 2025-01-03 NOTE — PROGRESS NOTE ADULT - ATTENDING COMMENTS
73 year old man with GONZALES cirrhosis and HCC, HTN, DM, AF, BPH  Underwent liver transplant on  11/15/24. Post-op course complicated by  septic shock in the setting of ischemic bowel s/p total colectomy and ileostomy creation 12/9/24  Has poor mental status, acute hypoxic respiratory failure requiring multiple intubations/extubations, eventually had tracheostomy on 12/17/24, recurrent infections, UGI bleed 12/26/24.    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    E. 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 Completed 10 day course of minocycline 12/21-1/1  Shock requiring vasopressors - on  phenylephrine, also on midodrine and albumin q6h.   Initiated on CRRT on 12/7, not tolerating iHD due to hypotension. Lyes and volume status fair,  continue CRRT net even  OLT on cyclosporine and Methylpred, ppx Ganciclovir, Bactrim

## 2025-01-03 NOTE — CONSULT NOTE ADULT - SUBJECTIVE AND OBJECTIVE BOX
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 Pt underwent Y90 Sept, 2023 with favorable treatment response. Pt completed OLTx evaluation and listed for OLTx 5/03/2024. Completed OLT on 11/15. Course c/b septic shock, s/p colectomy, on ABX, CRRT, s/p trach on tube feeds.     Transfusion Medicine contacted to initiate therapeutic plasma exchange (TPE).     Male    74y    PAST MEDICAL & SURGICAL HISTORY:  Diabetes    Transaminitis    Paroxysmal atrial fibrillation    Depression    BPH (benign prostatic hyperplasia)    Hypertension    Chronic atrial fibrillation    Coronary artery disease    Hepatocellular carcinoma    Paroxysmal atrial fibrillation    Cirrhosis    HCC (hepatocellular carcinoma)    History of BPH    History of laparoscopic cholecystectomy    History of lumbar laminectomy    H/O prior ablation treatment    H/O percutaneous left heart catheterization    MEDICATIONS  (STANDING):  albumin human  5% IVPB 500 milliLiter(s) IV Intermittent every 6 hours  buDESOnide    Inhalation Suspension 0.5 milliGRAM(s) Inhalation every 12 hours  caspofungin IVPB      caspofungin IVPB 50 milliGRAM(s) IV Intermittent every 24 hours  chlorhexidine 0.12% Liquid 15 milliLiter(s) Oral Mucosa every 12 hours  chlorhexidine 2% Cloths 1 Application(s) Topical <User Schedule>  CRRT Treatment    <Continuous>  cycloSPORINE  , modified (GENGRAF) Solution 75 milliGRAM(s) Oral <User Schedule>  cycloSPORINE  , modified (GENGRAF) Solution 50 milliGRAM(s) Oral <User Schedule>  diphenoxylate/atropine 2 Tablet(s) Oral every 6 hours  ganciclovir IVPB 250 milliGRAM(s) IV Intermittent every 24 hours  insulin lispro (ADMELOG) corrective regimen sliding scale   SubCutaneous every 6 hours  loperamide Liquid 2 milliGRAM(s) Enteral Tube every 6 hours  meropenem  IVPB 1000 milliGRAM(s) IV Intermittent every 8 hours  methylPREDNISolone sodium succinate Injectable 32 milliGRAM(s) IV Push <User Schedule>  metroNIDAZOLE  IVPB 500 milliGRAM(s) IV Intermittent every 8 hours  midodrine 10 milliGRAM(s) Oral every 8 hours  mupirocin 2% Ointment 1 Application(s) Topical every 12 hours  nystatin Powder 1 Application(s) Topical every 12 hours  pantoprazole  Injectable 40 milliGRAM(s) IV Push every 12 hours  phenylephrine    Infusion 0.1 MICROgram(s)/kG/Min (3.73 mL/Hr) IV Continuous <Continuous>  Phoxillum Filtration BK 4 / 2.5 5000 milliLiter(s) (1250 mL/Hr) CRRT <Continuous>  Phoxillum Filtration BK 4 / 2.5 5000 milliLiter(s) (250 mL/Hr) CRRT <Continuous>  phytonadione   Solution 5 milliGRAM(s) Enteral Tube every 24 hours  PrismaSATE Dialysate BK 0 / 3.5 5000 milliLiter(s) (1500 mL/Hr) CRRT <Continuous>  trimethoprim  40 mG/sulfamethoxazole 200 mG Suspension 80 milliGRAM(s) Enteral Tube daily  ursodiol Suspension 300 milliGRAM(s) Oral every 12 hours  vasopressin Infusion 0.03 Unit(s)/Min (4.5 mL/Hr) IV Continuous <Continuous>    MEDICATIONS  (PRN):  albuterol/ipratropium for Nebulization 3 milliLiter(s) Nebulizer every 6 hours PRN Shortness of Breath and/or Wheezing  FIRST- Mouthwash  BLM 10 milliLiter(s) Swish and Spit every 8 hours PRN Mouth Care  oxyCODONE    Solution 5 milliGRAM(s) Oral every 4 hours PRN Severe Pain (7 - 10)  oxyCODONE    Solution 2.5 milliGRAM(s) Oral every 4 hours PRN Moderate Pain (4 - 6)      Social History:  Drinking:   Y(  )   N(  )                Smoking:  Y(  )  N(  )           Marital Status:  Y(  )  N(  )      FAMILY HISTORY:  Family history of coronary artery disease (Father, Sibling, Sibling)    Family history of diabetes mellitus (Father, Mother)    Family history of coronary artery disease (Sibling)    Allergies    No Known Allergies    Intolerances    REVIEW OF SYSTEMS:  Unable to obtain:    Vital Signs Last 24 Hrs  T(C): 36.8 (03 Jan 2025 12:00), Max: 37 (03 Jan 2025 08:00)  T(F): 98.2 (03 Jan 2025 12:00), Max: 98.6 (03 Jan 2025 08:00)  HR: 104 (03 Jan 2025 14:00) (96 - 109)  BP: --  BP(mean): --  RR: 33 (03 Jan 2025 14:00) (19 - 37)  SpO2: 100% (03 Jan 2025 14:00) (96% - 100%)    Parameters below as of 03 Jan 2025 08:00  Patient On (Oxygen Delivery Method): ventilator    Daily     Daily     PHYSICAL EXAM:  General: obtunded, frail appearing, on vent via tracheostomy  ENT: no epistaxis   Cardiovascular: NSR  Pulmonary: on trach collar   Abdomen: + ileostomy   Catheters/Tubes/Wires: Right IJ dialysis catheter                         8.0    4.60  )-----------( 19       ( 03 Jan 2025 12:11 )             24.0     Reticulocyte Percent: 4.7 % (01-03 @ 00:32)    Hematocrit: 24.0 % (01-03 @ 12:11)  Hematocrit: 24.9 % (01-03 @ 06:34)  Hematocrit: 26.4 % (01-03 @ 00:32)  Hematocrit: 25.7 % (01-02 @ 18:09)  Hematocrit: 27.1 % (01-02 @ 13:01)  Hematocrit: 23.1 % (01-02 @ 07:02)  Hematocrit: 24.8 % (01-02 @ 00:47)  Hematocrit: 30.3 % (01-01 @ 17:51)  Hematocrit: 27.3 % (01-01 @ 12:26)  Hematocrit: 26.3 % (01-01 @ 06:18)  Hematocrit: 26.4 % (12-31 @ 23:44)  Hematocrit: 27.4 % (12-31 @ 18:32)    01-03    138  |  105  |  21  ----------------------------<  134[H]  3.8   |  19[L]  |  <0.30[L]    Ca    8.4      03 Jan 2025 12:11  Phos  2.8     01-03  Mg     2.3     01-03    TPro  3.6[L]  /  Alb  3.4  /  TBili  25.3[H]  /  DBili  x   /  AST  77[H]  /  ALT  185[H]  /  AlkPhos  81  01-03    Bilirubin Direct: >10.0 mg/dL (01-03 @ 00:32)  Bilirubin Direct: >10.0 mg/dL (01-02 @ 17:42)    Lactate Dehydrogenase, Serum: 258 U/L (01-03 @ 00:32)    PT/INR - ( 03 Jan 2025 12:11 )   PT: 39.2 sec;   INR: 3.45 ratio       PTT - ( 03 Jan 2025 12:11 )  PTT:100.8 sec

## 2025-01-03 NOTE — CONSULT NOTE ADULT - ASSESSMENT
74M with PMH of Afib (s/p ablation), CAD, GONZALES cirrhosis/HCC w/ portal HTN s/p OLT on 11/15. Post-operative course most notably complicated by hypoxic respiratory failure with re-intubations (11/20-11/24, and 11/24-11/26), Afib RVR, and mesenteric ischemia and cardiogenic shock s/p laparotomy, total abdominal colectomy (left in discontinuity), and IABP placement on 12/7, s/p second look laparotomy, ileostomy creation (Dr. Monae) on 12/9. S/p IABP removal on 12/10. Post-operative course since complicated by renal failure (on CRRT), liver failure, concern for transient ACS event (AC stopped due to GI bleed), and bacteremia. Colorectal Surgery consulted for high ileostomy output.    RECOMMENDATIONS:  -     ** NOTE INCOMPLETE 74M with PMH of Afib (s/p ablation), CAD, GONZALES cirrhosis/HCC w/ portal HTN s/p OLT on 11/15. Post-operative course most notably complicated by hypoxic respiratory failure with re-intubations (11/20-11/24, and 11/24-11/26), Afib RVR, and mesenteric ischemia and cardiogenic shock s/p laparotomy, total abdominal colectomy (left in discontinuity), and IABP placement on 12/7, s/p second look laparotomy, ileostomy creation (Dr. Monae) on 12/9. S/p IABP removal on 12/10. Post-operative course since complicated by renal failure (on CRRT), liver failure, concern for transient ACS event (AC stopped due to GI bleed), and bacteremia. Colorectal Surgery consulted for high ileostomy output.    RECOMMENDATIONS:  - Continue lomotil 2 tabs q6  - Imodium 2mg q6 added today, monitor for changes in ileostomy output  - If ostomy output does not improve with maximum lomotil and imodium, consider tincture of opium  - Appreciate remainder of care per SICU/Transplant    Discussed with senior resident    [facility] [phone] ASSESSMENT:  Ramón Davison is a 74 y.o. man with history of Afib (s/p ablation), CAD, GONZALES cirrhosis/HCC w/ portal HTN s/p OLT on 11/15 c/b cardiogenic shock s/p IABP (12/7-10), septic shock, respiratory failure s/p trach, renal failure (on CCRT), and mesenteric ischemia s/p total abdominal colectomy and end ileostomy (12/9) who remains critically ill in the SICU.     Colorectal consultation called back for recommendations regarding high ileostomy output. Agree with continued lomotil and the additional of Imodium today. If output is not adequately controlled, would suggest the addition of tincture of opium. Following ostomy creation, patient initially had an ileus. Sudden increase in output maybe secondary to another cause. May be in response to GIB given color and quality of ostomy output. Ostomy itself is pink and viable appearing. Blood in output is dark, suggesting possible UGI source. Given EGD finding from 12/26, likely due to ongoing oozing from stomach, especially in the setting of elevated INR.      RECOMMENDATIONS:  - Continue lomotil 2 tabs q6  - Imodium 2mg q6 added today, monitor for changes in ileostomy output  - If ostomy output does not improve with maximum lomotil and imodium, consider tincture of opium  - No colorectal surgery intervention indicated at this time  - Appreciate remainder of care per SICU/Transplant    Discussed with senior resident    [facility] [phone]

## 2025-01-03 NOTE — PROGRESS NOTE ADULT - ASSESSMENT
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   s/p OLT 11/15 with complicated post op course    [] Septic shock/Cardiogenic shock  [] s/p Colectomy 12/7   [] RTOR for closure and Ileostomy creation   [] IAPB 12/7- removed 12/10  - E coli Bacteremia (12/6)  Received Tobra x 1 12/6 .   - EC faecalis asc fluid ( 12/20) (12/26)  - Ec faecalis UTI (12/16)  - Tx ID following - on Christopher/Caspo.Flagyl/Bactrim DS  - Neutropenia: received Neupogen 480mcg x2  - MORAIMA: CRRT started 12/7, stopped 12/15, resumed CRRT 12/23  - 12/23 BCX: Clostridium  - 12/23 CT chest/Abd/pelvis: GGO, splenic infarcts, ileus  - 1/1 CT CAP: small pleural effusions, enteritis, rest ok  - repeat cx 12/28 + for Gram variable rods, repeat cx sent 1/1  - S/P tracheotomy 12/17  - UGIB s/p EGD showing generalized oozing, coagulopathy       - Protonix BID       - TF at goal    [] s/p OLT  - stable graft function, recovering shock  - repeat liver US unchanged  - Diet: TFs to goal  - Pain management: avoid narcotics  - Strict I&Os, wound vac placed 12/10   - US: L brachial DVT (holding A/C)  - wound vac exchange for ileostomy (12/13)  - HIT panel neg (12/14)  - ursodiol 300mgBID     [ ] Immunosuppression  - Tacrolimus stopped 11/18 in setting of AMS/Seizure, Started Cyclo 12/17  - Cyclo per level, MMF HELD, Solumedrol 32 mg daily    [] lleus -- resolving  -@ home on Linzess  - imaging with distended colon, improving, (likely opioid induced)  - s/p Neostigmine x 2  - s/p Relistor, last dose 12/1  - s/p colectomy with end ileostomy  (see above)  - ileostomy with >1L output, lomotil as needed    [] CMV viremia  - CMV PCR (12/11 and 12/16): neg, positive CMV PCR on 12/23  - PPx: Gancyclovir (HELD) in setting of thrombocytopenia initially due to low PLT  - CMV viral load 537 12/31, will attempt Ganciclovir again     [ ] AMS  - Reintubated 11/20 Aspiration/hypoxia, extubated 11/24->[id number]/24--> extubated 11/26 -> [id number]/7 -> tracheostomy 12/17 -> trach collar trials starting 12/29  - EEG 11/30 negative, Neurology following  - BH following   - off tacro  - on keppra  -CT Head No Cont (12/20): Age-indeterminate posterior left frontal lobe infarct.   -CT Head (12/25): Evolving subacute infarct in the left supramarginal gyrus  -repeat CTH ok    [ ] HTN/ pAFib  [ ] coronary vasospasm vs posterior wall MI  - EKG 12/24 c/f ST depression, rising troponins: 1800s  - d/c argatroban and aspirin due to bleeding  - Heparin gtt 12/19-21  - Cards- plan for PCI prior to DC   - Off Amiodarone, off dobutamine  - Off metoprolol for soft bp.  - Dr. 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   s/p OLT 11/15 with complicated post op course    [] Septic shock/Cardiogenic shock  [] s/p Colectomy 12/7   [] RTOR for closure and Ileostomy creation   [] IAPB 12/7- removed 12/10  - E coli Bacteremia (12/6)  Received Tobra x 1 12/6 .   - EC faecalis asc fluid ( 12/20) (12/26)  - Ec faecalis UTI (12/16)  - Tx ID following - on Christopher/Caspo.Flagyl/Bactrim DS add Tobramycin  - Neutropenia: received Neupogen 480mcg x2  - MORAIMA: CRRT started 12/7, stopped 12/15, resumed CRRT 12/23  - 12/23 BCX: Clostridium  - 12/23 CT chest/Abd/pelvis: GGO, splenic infarcts, ileus  - 1/1 CT CAP: small pleural effusions, enteritis, rest ok  - repeat cx 12/28 + for Gram variable rods, repeat cx sent 1/1  - S/P tracheotomy 12/17  - Change out all lines      [ ] UGIB s/p EGD showing generalized oozing, coagulopathy  - Correct coagulopathy per SICU  - Protonix BID  - TF at goal    [] s/p OLT  - stable graft function, recovering shock  - repeat liver US unchanged  - Diet: TFs to goal  - Pain management: avoid narcotics  - Strict I&Os, wound vac placed 12/10   - US: L brachial DVT (holding A/C)  - wound vac exchange for ileostomy (12/13)  - HIT panel neg (12/14)  - ursodiol 300mgBID     [ ] Immunosuppression  - Tacrolimus stopped 11/18 in setting of AMS/Seizure, Started Cyclo 12/17  - Cyclo per level, MMF HELD, Solumedrol 32 mg daily    [] lleus -- resolving  -@ home on Linzess  - imaging with distended colon, improving, (likely opioid induced)  - s/p Neostigmine x 2  - s/p Relistor, last dose 12/1  - s/p colectomy with end ileostomy  (see above)  - ileostomy with >1L output, lomotil as needed, and change tube feeds    [] CMV viremia  - CMV PCR (12/11 and 12/16): neg, positive CMV PCR on 12/23  - PPx: Gancyclovir (HELD) in setting of thrombocytopenia initially due to low PLT  - CMV viral load 537 12/31, will attempt Ganciclovir again     [ ] AMS  - Reintubated 11/20 Aspiration/hypoxia, extubated 11/24->[id number]/24--> extubated 11/26 -> aitgnjlltcd24/7 -> tracheostomy 12/17 -> trach collar trials starting 12/29  - EEG 11/30 negative, Neurology following  - BH following   - off tacro  - on keppra  -CT Head No Cont (12/20): Age-indeterminate posterior left frontal lobe infarct.   -CT Head (12/25): Evolving subacute infarct in the left supramarginal gyrus  -repeat CTH ok    [ ] HTN/ pAFib  [ ] coronary vasospasm vs posterior wall MI  - EKG 12/24 c/f ST depression, rising troponins: 1800s  - d/c argatroban and aspirin due to bleeding  - Heparin gtt 12/19-21  - Cards- plan for PCI prior to DC   - Off Amiodarone, off dobutamine  - Off metoprolol for soft bp.  - Dr. 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   s/p OLT 11/15 with complicated post op course    [] Septic shock/Cardiogenic shock  [] s/p Colectomy 12/7   [] RTOR for closure and Ileostomy creation   [] IAPB 12/7- removed 12/10  - E coli Bacteremia (12/6)  Received Tobra x 1 12/6 .   - EC faecalis asc fluid ( 12/20) (12/26)  - Ec faecalis UTI (12/16)  - Tx ID following - on Christopher/Caspo.Flagyl/Bactrim DS add Tobramycin   - Neutropenia: received Neupogen 480mcg x2  - MORAIMA: CRRT started 12/7, stopped 12/15, resumed CRRT 12/23  - 12/23 BCX: Clostridium  - 12/23 CT chest/Abd/pelvis: GGO, splenic infarcts, ileus  - 1/1 CT CAP: small pleural effusions, enteritis, rest ok  - repeat cx 12/28 clostridium, repeat cx sent 1/1 NGTD  - S/P tracheotomy 12/17  - Change out all lines  - Wound care for sacral decubitus ulcer      [ ] UGIB s/p EGD showing generalized oozing, coagulopathy  - Correct coagulopathy per SICU  - Protonix BID  - TF at goal  - Vitamin K 3 times weekly w/ feeds    [] s/p OLT  - poor graft function, trial of plasmapheresis today  - repeat liver US unchanged  - Diet: TFs to goal  - Pain management: avoid narcotics  - Strict I&Os, wound vac placed 12/10   - US: L brachial DVT (holding A/C)  - wound vac exchange for ileostomy (12/13)  - HIT panel neg (12/14)  - ursodiol 300mgBID     [ ] Immunosuppression  - Tacrolimus stopped 11/18 in setting of AMS/Seizure, Started Cyclo 12/17  - Cyclo per level, MMF HELD, Solumedrol 32 mg daily    [] lleus -- resolving  -@ home on Linzess  - imaging with distended colon, improving, (likely opioid induced)  - s/p Neostigmine x 2  - s/p Relistor, last dose 12/1  - s/p colectomy with end ileostomy  (see above)  - ileostomy with >1L output, lomotil as needed, and change tube feeds  - Colorectal consulted    [] CMV viremia  - CMV PCR (12/11 and 12/16): neg, positive CMV PCR on 12/23  - PPx: Gancyclovir (HELD) in setting of thrombocytopenia initially due to low PLT  - CMV viral load 537 12/31, will attempt Ganciclovir again     [ ] AMS  - Reintubated 11/20 Aspiration/hypoxia, extubated 11/24->gnmbzkylbdg62/24--> extubated 11/26 -> qbzwgdhjyjv17/7 -> tracheostomy 12/17 -> trach collar trials starting 12/29  - EEG 11/30 negative, Neurology following  - BH following   - off tacro  - on keppra  -CT Head No Cont (12/20): Age-indeterminate posterior left frontal lobe infarct.   -CT Head (12/25): Evolving subacute infarct in the left supramarginal gyrus  -repeat CTH ok    [ ] HTN/ pAFib  [ ] coronary vasospasm vs posterior wall MI  - EKG 12/24 c/f ST depression, rising troponins: 1800s  - d/c argatroban and aspirin due to bleeding  - Heparin gtt 12/19-21  - Cards- plan for PCI prior to DC   - Off Amiodarone, off dobutamine  - Off metoprolol for soft bp.  - Dr. Quintanilla following    [ ] DM  - per SICU     []Scrotal abscess   - US (12/12): 2.1 x0.9 x 3.2 cm focal, right testicle- possible abscess (12/12)  - Urology recs: CT scrotum review no debridement required  - Urology recs Derm consult for guidance on wound care   - 12/23 US doppler scrotum: no arterial flow, limited venous flow, bl hydrocele  - 12/15 CT CAP no acute changes   - Elevate scrotum w/ support Urology signed off 12/29

## 2025-01-03 NOTE — PROGRESS NOTE ADULT - ASSESSMENT
ASSESSMENT: 74 male w/ a PMHx of HTN, DM, AF, BPH, MASH cirrhosis and HCC s/p OLT on 11/15. Case was uneventful but post-op course has been prolonged and c/b delirium, aspiration, multiple episodes of acute hypoxic respiratory failure requiring reintubation from 11/20-11/24 & 11/24-11/26, AF w/ RVR, ileus requiring NGT, distended colon requiring rectal tube, dysphagia, malnutrition, hypernatremia, fevers, pancytopenia, poorly controlled glucoses, and left brachial VTE. Patient went into severe refractory septic shock on 12/6 w/ blood cultures positive for E. coli s/p s/p ex lap, total abd colectomy, end ileostomy, 3 intentionally retained laparotomy pads for bleeding, Abthera, IABP placement w/ admission to CTICU, Patient required inotropes, Jacqui, and CRRT post-op. s/p closure 12/9. IABP removed 12/10 and transferred back to SICU 12/13. SICU course c/b narrow complex arrythmia w/ ECG showing new ST elevations concerning for posterior wall MI vs vasospasms, cards consulted and started on heparin gtt for empiric ACS tx which was c/b GIB then transitioned to argatroban c/b bleed. TTE revealed LVOT obstruction & TODD.     PLAN:    Neuro:  - Pain: PRN oxy solution  - Opens eyes intermittently, intermittently following commands, off sedation  - CT head 11/19, 11/21, 11/25, 11/29, 12/5 negative  - EEG: Mild diffuse cerebral dysfunction that is not specific in etiology. No epileptic discharges recorded. No seizures recorded.  - Holding home xanax, Abilify, Zoloft, Remeron, Lexapro  - CT head 12/20 showing age-indeterminate infarct, f/u Neurology recs  - No need for MRI     Resp:  - S/p bedside tracheostomy 12/17  - PRVC 14/450/5/30  - trach collar daily, full support overnight  - c/w inhaled bronchodilator    CV:  - Aldo, vaso  - 10mg midodrine q8  - home metoprolol 12.5 q8hr held for pressor req  - IABP removed 12/10  - TTE 12/6 w/ LVOT obstruction & TODD  - TTE 12/11 shows EF of 55-60%    GI:  - trend LFTs  - NPO, NGT (can be to gravity tube feeds or to gravity, no suction)   - lomotil 2 tabs QID  - protonix BID  - monitor ALYSSA output    /Renal:  - albumin 500 q 6  - CRRT restarted 12/23  - Monitor BUN/Cr, I&Os, trend UOP  - Scrotal US 12/15 -> edema, no abscess -> elevate  - repeat scrotal doppler for concern of ischemia > low flow state, low concern for abscess  - Bladder scan q12    Heme:  - trend H/H, PLTs, coags  - hep gtt and argatroban gtt held i/s/o bleed  - s/p desmopression 30 x 1 on 12/26    ID:  - ABX: caspo, jeniffer, flagyl  - transplant ppx w/ bactrim  - Ganciclovir for CMV   - immunosuppression w/ cyclosporine  - 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

## 2025-01-03 NOTE — CONSULT NOTE ADULT - ASSESSMENT
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 Post-op course c/b worsening mental status and acute hypoxic respiratory failure requiring multiple intubations/extubations, most recently on 12/7 with conversion to tracheostomy on 12/17, e.coli bacteremia with RTOR on 12/7 for concern for worsening septic shock and cardiogenic shock s/p balloon pump placement and total abdominal colectomy in setting of ischemic bowel s/p OR on 12/9 for ileostomy creation, and olirugirc MORAIMA requiring CRRT and now intermittent HD. Now with total bilirubin uptrending (Tbili 25.8), rising INR (3.35), transaminitis, and thrombocytopenia. Transfusion Medicine consulted by transplant team to initiate therapeutic plasma exchange (TPE) due to poor graft function.     - Plan for course of daily plasma exchange over 3 days  - 1/03/24: TPE#1 scheduled, with donor plasma as replacement fluid.     Therapeutic plasma exchange (TPE) procedure (including replacement with 5% albumin and/or plasma and anticoagulation), benefits, risks and complications of the procedure (electrolyte imbalance, fluid imbalance, transfusion transmitted infections and transfusion reactions which may be life threatening) and alternative options explained in detail to son of the patient who verbalized understanding and consented to the procedure after all questions answered.

## 2025-01-03 NOTE — CHART NOTE - NSCHARTNOTEFT_GEN_A_CORE
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 Post-op course c/b worsening mental status and acute hypoxic respiratory failure requiring multiple intubations/extubations, most recently on 12/7 with conversion to tracheostomy on 12/17, e.coli bacteremia with RTOR on 12/7 for concern for worsening septic shock and cardiogenic shock s/p balloon pump placement and total abdominal colectomy in setting of ischemic bowel s/p OR on 12/9 for ileostomy creation, and oligouric MORAIMA requiring CRRT and now intermittent HD. As of 01/03/2025, the total bilirubin is uptrending (Tbili 25.8), rising INR (3.35), transaminitis, and thrombocytopenia. Transfusion Medicine was consulted by transplant team to initiate therapeutic plasma exchange (TPE) due to poor graft function. Course of 3 TPE planned over next 3 days.     - PLEX#1: Performed on 01/03/24 with one plasma volume using donor plasma as replacement fluid. Patient tolerated the procedure well.    - PLEX#2: 01/04/24 Scheduled.

## 2025-01-03 NOTE — PROGRESS NOTE ADULT - SUBJECTIVE AND OBJECTIVE BOX
Transplant Surgery - Multidisciplinary Rounds  --------------------------------------------------------------  OLT   11/15/2024         Colectomy 12/7/2024     IABP 12/7 removed 12/10  Tracheostomy 12/17/2024    Present:   Patient seen and examined with multidisciplinary Transplant team including Surgeon: Dr. Hutchinson, Dr. Sorto, JAZZ Fisher/JOHN Ferraro, Hepatologist: Dr. Ponce/Jyothi, Pharmacist: Ralph and bedside RN in AM rounds.   Disciplines not in attendance will be notified of the plan.     HPI: 73M retired Urologist 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     s/p OLT 11/15 with post op course c/b:  Hypoxia: Reintubated 11/20, extubated 11/24->reintubated 11/24, extubated 11/26, reintubated 12/7, trached 12/17  Ileus   A fib  AMS/Seizure   L brachial DVT  Neutropenia  E coli Bacteremia (12/6)  s/p Colectomy 12/7.   IABP 12/7, removed 12/10  Ec Faecalis UTI (12/16)  Stenotrophamonas in trach aspirate (12/19)  Clostridium in blood 12/23  UGIB 12/26    Interval/Overnight Events:   -Afebrile, on kassandra   -on CRRT net even, anuric   -coagulopathy corrected by SICU (1u PRBC, 2uPLT)    Immunosuppression:  - Cyclo per level, MMF HELD, Solu 32  -ongoing monitoring for signs of rejection    MEDICATIONS  (STANDING):  albumin human  5% IVPB 500 milliLiter(s) IV Intermittent every 6 hours  buDESOnide    Inhalation Suspension 0.5 milliGRAM(s) Inhalation every 12 hours  caspofungin IVPB      caspofungin IVPB 50 milliGRAM(s) IV Intermittent every 24 hours  chlorhexidine 0.12% Liquid 15 milliLiter(s) Oral Mucosa every 12 hours  chlorhexidine 2% Cloths 1 Application(s) Topical <User Schedule>  CRRT Treatment    <Continuous>  cycloSPORINE  , modified (GENGRAF) Solution 75 milliGRAM(s) Oral <User Schedule>  cycloSPORINE  , modified (GENGRAF) Solution 50 milliGRAM(s) Oral <User Schedule>  diphenoxylate/atropine 2 Tablet(s) Oral every 6 hours  ganciclovir IVPB 250 milliGRAM(s) IV Intermittent every 24 hours  insulin lispro (ADMELOG) corrective regimen sliding scale   SubCutaneous every 6 hours  meropenem  IVPB 1000 milliGRAM(s) IV Intermittent every 8 hours  methylPREDNISolone sodium succinate Injectable 32 milliGRAM(s) IV Push <User Schedule>  metroNIDAZOLE  IVPB 500 milliGRAM(s) IV Intermittent every 8 hours  midodrine 10 milliGRAM(s) Oral every 8 hours  mupirocin 2% Ointment 1 Application(s) Topical every 12 hours  nystatin Powder 1 Application(s) Topical every 12 hours  pantoprazole  Injectable 40 milliGRAM(s) IV Push every 12 hours  phenylephrine    Infusion 0.1 MICROgram(s)/kG/Min (3.73 mL/Hr) IV Continuous <Continuous>  Phoxillum Filtration BK 4 / 2.5 5000 milliLiter(s) (1250 mL/Hr) CRRT <Continuous>  Phoxillum Filtration BK 4 / 2.5 5000 milliLiter(s) (250 mL/Hr) CRRT <Continuous>  PrismaSATE Dialysate BGK 4 / 2.5 5000 milliLiter(s) (1500 mL/Hr) CRRT <Continuous>  trimethoprim  40 mG/sulfamethoxazole 200 mG Suspension 80 milliGRAM(s) Enteral Tube daily  ursodiol Suspension 300 milliGRAM(s) Oral every 12 hours    MEDICATIONS  (PRN):  albuterol/ipratropium for Nebulization 3 milliLiter(s) Nebulizer every 6 hours PRN Shortness of Breath and/or Wheezing  FIRST- Mouthwash  BLM 10 milliLiter(s) Swish and Spit every 8 hours PRN Mouth Care  oxyCODONE    Solution 5 milliGRAM(s) Oral every 4 hours PRN Severe Pain (7 - 10)  oxyCODONE    Solution 2.5 milliGRAM(s) Oral every 4 hours PRN Moderate Pain (4 - 6)      PAST MEDICAL & SURGICAL HISTORY:  Diabetes      Transaminitis      Paroxysmal atrial fibrillation      Depression      BPH (benign prostatic hyperplasia)      Hypertension      Chronic atrial fibrillation      Coronary artery disease      Hepatocellular carcinoma      DM (diabetes mellitus)      HTN (hypertension)      Paroxysmal atrial fibrillation      Cirrhosis      HCC (hepatocellular carcinoma)      History of BPH      History of laparoscopic cholecystectomy      History of lumbar laminectomy      H/O prior ablation treatment      H/O percutaneous left heart catheterization          Vital Signs Last 24 Hrs  T(C): 36 (02 Jan 2025 19:00), Max: 36.5 (02 Jan 2025 07:00)  T(F): 96.8 (02 Jan 2025 19:00), Max: 97.7 (02 Jan 2025 07:00)  HR: 107 (03 Jan 2025 05:00) (85 - 109)  BP: 119/56 (02 Jan 2025 07:45) (119/56 - 119/56)  BP(mean): 81 (02 Jan 2025 07:45) (81 - 81)  RR: 31 (03 Jan 2025 05:00) (17 - 31)  SpO2: 100% (03 Jan 2025 05:00) (94% - 100%)    Parameters below as of 02 Jan 2025 21:30  Patient On (Oxygen Delivery Method): ventilator        I&O's Summary    01 Jan 2025 07:01  -  02 Jan 2025 07:00  --------------------------------------------------------  IN: 5081 mL / OUT: 4678 mL / NET: 403 mL    02 Jan 2025 07:01  -  03 Jan 2025 06:00  --------------------------------------------------------  IN: 5407.8 mL / OUT: 4831 mL / NET: 576.8 mL                              8.8    4.63  )-----------( 24       ( 03 Jan 2025 00:32 )             26.4     01-03    140  |  105  |  21  ----------------------------<  159[H]  4.1   |  18[L]  |  <0.30[L]    Ca    8.1[L]      03 Jan 2025 00:32  Phos  3.2     01-03  Mg     2.4     01-03    TPro  3.9[L]  /  Alb  3.5  /  TBili  25.2[H]  /  DBili  >10.0[H]  /  AST  82[H]  /  ALT  194[H]  /  AlkPhos  89  01-03    ROS: Unable to assess patient is lethargic, s/p tracheostomy    PHYSICAL EXAM:   Constitutional: trach to vent, awake  Eyes:  PERRLA  ENMT: nc/at, no thrush   Neck: supple, trach   Respiratory: CTA B/L  Cardiovascular: RRR  Gastrointestinal: incision clean/dry/intact + Ostomy pink output in bag, wound vac, ALYSSA x2 SS fluid  Genitourinary: +scrotal edema w/ large fluid collection; black discoloration   Extremities: SCD's in place and working bilaterally, + LE edema  Neurological: trach to vent    Skin: no rashes, ulcerations, lesions  Transplant Surgery - Multidisciplinary Rounds  --------------------------------------------------------------  OLT   11/15/2024         Colectomy 12/7/2024     IABP 12/7 removed 12/10  Tracheostomy 12/17/2024    Present:   Patient seen and examined with multidisciplinary Transplant team including Surgeon: Dr. Hutchinson, Dr. Sorto, JAZZ Fisher/JOHN Ferraro, Hepatologist: Dr. Ponce/Jyothi, Pharmacist: Ralph and bedside RN in AM rounds.   Disciplines not in attendance will be notified of the plan.     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     s/p OLT 11/15 with post op course c/b:  Hypoxia: Reintubated 11/20, extubated 11/24->reintubated 11/24, extubated 11/26, reintubated 12/7, trached 12/17  Ileus   A fib  AMS/Seizure   L brachial DVT  Neutropenia  E coli Bacteremia (12/6)  s/p Colectomy 12/7.   IABP 12/7, removed 12/10  Ec Faecalis UTI (12/16)  Stenotrophamonas in trach aspirate (12/19)  Clostridium in blood 12/23  UGIB 12/26    Interval/Overnight Events:   -Afebrile, on kassandra   -on CRRT net even, anuric   -coagulopathy corrected by SICU (1u PRBC, 2uPLT)    Immunosuppression:  - Cyclo hold increase, MMF HELD, Solu 32  -ongoing monitoring for signs of rejection      MEDICATIONS  (STANDING):  albumin human  5% IVPB 500 milliLiter(s) IV Intermittent every 6 hours  buDESOnide    Inhalation Suspension 0.5 milliGRAM(s) Inhalation every 12 hours  caspofungin IVPB 50 milliGRAM(s) IV Intermittent every 24 hours  caspofungin IVPB      chlorhexidine 0.12% Liquid 15 milliLiter(s) Oral Mucosa every 12 hours  chlorhexidine 2% Cloths 1 Application(s) Topical <User Schedule>  CRRT Treatment    <Continuous>  cycloSPORINE  , modified (GENGRAF) Solution 75 milliGRAM(s) Oral <User Schedule>  cycloSPORINE  , modified (GENGRAF) Solution 50 milliGRAM(s) Oral <User Schedule>  diphenoxylate/atropine 2 Tablet(s) Oral every 6 hours  ganciclovir IVPB 250 milliGRAM(s) IV Intermittent every 24 hours  insulin lispro (ADMELOG) corrective regimen sliding scale   SubCutaneous every 6 hours  meropenem  IVPB 1000 milliGRAM(s) IV Intermittent every 8 hours  methylPREDNISolone sodium succinate Injectable 32 milliGRAM(s) IV Push <User Schedule>  metroNIDAZOLE  IVPB 500 milliGRAM(s) IV Intermittent every 8 hours  midodrine 10 milliGRAM(s) Oral every 8 hours  mupirocin 2% Ointment 1 Application(s) Topical every 12 hours  nystatin Powder 1 Application(s) Topical every 12 hours  pantoprazole  Injectable 40 milliGRAM(s) IV Push every 12 hours  phenylephrine    Infusion 0.1 MICROgram(s)/kG/Min (3.73 mL/Hr) IV Continuous <Continuous>  Phoxillum Filtration BK 4 / 2.5 5000 milliLiter(s) (1250 mL/Hr) CRRT <Continuous>  Phoxillum Filtration BK 4 / 2.5 5000 milliLiter(s) (250 mL/Hr) CRRT <Continuous>  PrismaSATE Dialysate BK 0 / 3.5 5000 milliLiter(s) (1500 mL/Hr) CRRT <Continuous>  trimethoprim  40 mG/sulfamethoxazole 200 mG Suspension 80 milliGRAM(s) Enteral Tube daily  ursodiol Suspension 300 milliGRAM(s) Oral every 12 hours    MEDICATIONS  (PRN):  albuterol/ipratropium for Nebulization 3 milliLiter(s) Nebulizer every 6 hours PRN Shortness of Breath and/or Wheezing  FIRST- Mouthwash  BLM 10 milliLiter(s) Swish and Spit every 8 hours PRN Mouth Care  oxyCODONE    Solution 5 milliGRAM(s) Oral every 4 hours PRN Severe Pain (7 - 10)  oxyCODONE    Solution 2.5 milliGRAM(s) Oral every 4 hours PRN Moderate Pain (4 - 6)      PAST MEDICAL & SURGICAL HISTORY:  Diabetes      Transaminitis      Paroxysmal atrial fibrillation      Depression      BPH (benign prostatic hyperplasia)      Hypertension      Chronic atrial fibrillation      Coronary artery disease      Hepatocellular carcinoma      DM (diabetes mellitus)      HTN (hypertension)      Paroxysmal atrial fibrillation      Cirrhosis      HCC (hepatocellular carcinoma)      History of BPH      History of laparoscopic cholecystectomy      History of lumbar laminectomy      H/O prior ablation treatment      H/O percutaneous left heart catheterization          Vital Signs Last 24 Hrs  T(C): 37 (03 Jan 2025 08:00), Max: 37 (03 Jan 2025 08:00)  T(F): 98.6 (03 Jan 2025 08:00), Max: 98.6 (03 Jan 2025 08:00)  HR: 107 (03 Jan 2025 10:00) (96 - 109)  BP: --  BP(mean): --  RR: 35 (03 Jan 2025 10:00) (19 - 36)  SpO2: 100% (03 Jan 2025 10:00) (96% - 100%)    Parameters below as of 03 Jan 2025 08:00  Patient On (Oxygen Delivery Method): ventilator        I&O's Summary    02 Jan 2025 07:01  -  03 Jan 2025 07:00  --------------------------------------------------------  IN: 6263.9 mL / OUT: 4902 mL / NET: 1361.9 mL    03 Jan 2025 07:01  -  03 Jan 2025 10:20  --------------------------------------------------------  IN: 248.1 mL / OUT: 395 mL / NET: -146.9 mL                              8.5    4.93  )-----------( 23       ( 03 Jan 2025 06:34 )             24.9     01-03    138  |  105  |  21  ----------------------------<  146[H]  4.2   |  18[L]  |  <0.30[L]    Ca    8.3[L]      03 Jan 2025 06:34  Phos  2.9     01-03  Mg     2.4     01-03    TPro  3.8[L]  /  Alb  3.4  /  TBili  25.8[H]  /  DBili  x   /  AST  78[H]  /  ALT  193[H]  /  AlkPhos  87  01-03          Culture - Blood (collected 01-02-25 @ 00:30)  Source: .Blood BLOOD  Preliminary Report (01-03-25 @ 05:01):    No growth at 24 hours    Culture - Blood (collected 01-01-25 @ 01:23)  Source: .Blood BLOOD  Preliminary Report (01-03-25 @ 05:01):    No growth at 24 hours    Culture - Blood (collected 12-28-24 @ 18:10)  Source: .Blood BLOOD  Gram Stain (01-01-25 @ 20:10):    Growth in anaerobic bottle: Gram Variable Rods  Final Report (01-02-25 @ 22:43):    Growth in anaerobic bottle: Clostridium paraputrificum "Susceptibilities    not performed"    Direct identification is available within approximately 3-5    hours either by Blood Panel Multiplexed PCR or Direct    MALDI-TOF. Details: [email or web address]  Organism: Blood Culture PCR (01-02-25 @ 22:43)  Organism: Blood Culture PCR (01-02-25 @ 22:43)    Culture - Blood (collected 12-28-24 @ 12:15)  Source: .Blood BLOOD  Final Report (01-02-25 @ 18:00):    No growth at 5 days        ROS: Unable to assess patient is lethargic, s/p tracheostomy    PHYSICAL EXAM:   Constitutional: trach to vent, awake, unresponsive  Eyes:  PERRLA, Scleral icterus  ENMT: nc/at, no thrush   Neck: supple, trach   Respiratory: CTA B/L  Cardiovascular: RRR  Gastrointestinal: incision clean/dry/intact + Ostomy pink output in bag w/ blood, wound vac, ALYSSA x2 SS fluid  Genitourinary: +scrotal edema w/ large fluid collection; black discoloration   Extremities: SCD's in place and working bilaterally, + LE edema  Neurological: trach to vent, not following commands  Skin: large ulcer on R. buttock and thigh Transplant Surgery - Multidisciplinary Rounds  --------------------------------------------------------------  OLT   11/15/2024         Colectomy 12/7/2024     IABP 12/7 removed 12/10  Tracheostomy 12/17/2024    Present:   Patient seen and examined with multidisciplinary Transplant team including Surgeon: Dr. Hutchinson, Dr. Sorto, JAZZ Fisher/Ale/JOHN Ferraro, Hepatologist: Dr. Ponce/Jyothi, Pharmacist: Jose Juan and bedside RN in AM rounds.   Disciplines not in attendance will be notified of the plan.     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     s/p OLT 11/15 with post op course c/b:  Hypoxia: Reintubated 11/20, extubated 11/24->reintubated 11/24, extubated 11/26, reintubated 12/7, trached 12/17  Ileus   A fib  AMS/Seizure   L brachial DVT  Neutropenia  E coli Bacteremia (12/6)  s/p Colectomy 12/7.   IABP 12/7, removed 12/10  Ec Faecalis UTI (12/16)  Stenotrophamonas in trach aspirate (12/19)  Clostridium in blood 12/23  UGIB 12/26    Interval/Overnight Events:   -Afebrile, on kassandra   -on CRRT net even, anuric   -coagulopathy corrected by SICU (1u PRBC, 2uPLT)  -poor mental status  -high ostomy output >1L    Immunosuppression:  - Cyclo per level, MMF HELD, Solu 32  -ongoing monitoring for signs of rejection      MEDICATIONS  (STANDING):  albumin human  5% IVPB 500 milliLiter(s) IV Intermittent every 6 hours  buDESOnide    Inhalation Suspension 0.5 milliGRAM(s) Inhalation every 12 hours  caspofungin IVPB 50 milliGRAM(s) IV Intermittent every 24 hours  caspofungin IVPB      chlorhexidine 0.12% Liquid 15 milliLiter(s) Oral Mucosa every 12 hours  chlorhexidine 2% Cloths 1 Application(s) Topical <User Schedule>  CRRT Treatment    <Continuous>  cycloSPORINE  , modified (GENGRAF) Solution 75 milliGRAM(s) Oral <User Schedule>  cycloSPORINE  , modified (GENGRAF) Solution 50 milliGRAM(s) Oral <User Schedule>  diphenoxylate/atropine 2 Tablet(s) Oral every 6 hours  ganciclovir IVPB 250 milliGRAM(s) IV Intermittent every 24 hours  insulin lispro (ADMELOG) corrective regimen sliding scale   SubCutaneous every 6 hours  meropenem  IVPB 1000 milliGRAM(s) IV Intermittent every 8 hours  methylPREDNISolone sodium succinate Injectable 32 milliGRAM(s) IV Push <User Schedule>  metroNIDAZOLE  IVPB 500 milliGRAM(s) IV Intermittent every 8 hours  midodrine 10 milliGRAM(s) Oral every 8 hours  mupirocin 2% Ointment 1 Application(s) Topical every 12 hours  nystatin Powder 1 Application(s) Topical every 12 hours  pantoprazole  Injectable 40 milliGRAM(s) IV Push every 12 hours  phenylephrine    Infusion 0.1 MICROgram(s)/kG/Min (3.73 mL/Hr) IV Continuous <Continuous>  Phoxillum Filtration BK 4 / 2.5 5000 milliLiter(s) (1250 mL/Hr) CRRT <Continuous>  Phoxillum Filtration BK 4 / 2.5 5000 milliLiter(s) (250 mL/Hr) CRRT <Continuous>  PrismaSATE Dialysate BK 0 / 3.5 5000 milliLiter(s) (1500 mL/Hr) CRRT <Continuous>  trimethoprim  40 mG/sulfamethoxazole 200 mG Suspension 80 milliGRAM(s) Enteral Tube daily  ursodiol Suspension 300 milliGRAM(s) Oral every 12 hours    MEDICATIONS  (PRN):  albuterol/ipratropium for Nebulization 3 milliLiter(s) Nebulizer every 6 hours PRN Shortness of Breath and/or Wheezing  FIRST- Mouthwash  BLM 10 milliLiter(s) Swish and Spit every 8 hours PRN Mouth Care  oxyCODONE    Solution 5 milliGRAM(s) Oral every 4 hours PRN Severe Pain (7 - 10)  oxyCODONE    Solution 2.5 milliGRAM(s) Oral every 4 hours PRN Moderate Pain (4 - 6)      PAST MEDICAL & SURGICAL HISTORY:  Diabetes      Transaminitis      Paroxysmal atrial fibrillation      Depression      BPH (benign prostatic hyperplasia)      Hypertension      Chronic atrial fibrillation      Coronary artery disease      Hepatocellular carcinoma      DM (diabetes mellitus)      HTN (hypertension)      Paroxysmal atrial fibrillation      Cirrhosis      HCC (hepatocellular carcinoma)      History of BPH      History of laparoscopic cholecystectomy      History of lumbar laminectomy      H/O prior ablation treatment      H/O percutaneous left heart catheterization          Vital Signs Last 24 Hrs  T(C): 37 (03 Jan 2025 08:00), Max: 37 (03 Jan 2025 08:00)  T(F): 98.6 (03 Jan 2025 08:00), Max: 98.6 (03 Jan 2025 08:00)  HR: 107 (03 Jan 2025 10:00) (96 - 109)  BP: --  BP(mean): --  RR: 35 (03 Jan 2025 10:00) (19 - 36)  SpO2: 100% (03 Jan 2025 10:00) (96% - 100%)    Parameters below as of 03 Jan 2025 08:00  Patient On (Oxygen Delivery Method): ventilator        I&O's Summary    02 Jan 2025 07:01  -  03 Jan 2025 07:00  --------------------------------------------------------  IN: 6263.9 mL / OUT: 4902 mL / NET: 1361.9 mL    03 Jan 2025 07:01  -  03 Jan 2025 10:20  --------------------------------------------------------  IN: 248.1 mL / OUT: 395 mL / NET: -146.9 mL                              8.5    4.93  )-----------( 23       ( 03 Jan 2025 06:34 )             24.9     01-03    138  |  105  |  21  ----------------------------<  146[H]  4.2   |  18[L]  |  <0.30[L]    Ca    8.3[L]      03 Jan 2025 06:34  Phos  2.9     01-03  Mg     2.4     01-03    TPro  3.8[L]  /  Alb  3.4  /  TBili  25.8[H]  /  DBili  x   /  AST  78[H]  /  ALT  193[H]  /  AlkPhos  87  01-03          Culture - Blood (collected 01-02-25 @ 00:30)  Source: .Blood BLOOD  Preliminary Report (01-03-25 @ 05:01):    No growth at 24 hours    Culture - Blood (collected 01-01-25 @ 01:23)  Source: .Blood BLOOD  Preliminary Report (01-03-25 @ 05:01):    No growth at 24 hours    Culture - Blood (collected 12-28-24 @ 18:10)  Source: .Blood BLOOD  Gram Stain (01-01-25 @ 20:10):    Growth in anaerobic bottle: Gram Variable Rods  Final Report (01-02-25 @ 22:43):    Growth in anaerobic bottle: Clostridium paraputrificum "Susceptibilities    not performed"    Direct identification is available within approximately 3-5    hours either by Blood Panel Multiplexed PCR or Direct    MALDI-TOF. Details: [email or web address]  Organism: Blood Culture PCR (01-02-25 @ 22:43)  Organism: Blood Culture PCR (01-02-25 @ 22:43)    Culture - Blood (collected 12-28-24 @ 12:15)  Source: .Blood BLOOD  Final Report (01-02-25 @ 18:00):    No growth at 5 days        ROS: Unable to assess patient is lethargic, s/p tracheostomy    PHYSICAL EXAM:   Constitutional: trach to vent, awake, unresponsive  Eyes:  PERRLA, Scleral icterus  ENMT: nc/at, no thrush   Neck: supple, trach   Respiratory: CTA B/L  Cardiovascular: RRR  Gastrointestinal: incision clean/dry/intact + Ostomy pink output in stool with old blood, wound vac, ALYSSA x2 SS fluid  Genitourinary: +scrotal edema w/ large fluid collection; black discoloration   Extremities: SCD's in place and working bilaterally, + LE edema  Neurological: trach to vent, not following commands  Skin: large ulcer on R. buttock and thigh

## 2025-01-03 NOTE — CONSULT NOTE ADULT - PROVIDER SPECIALTY LIST ADULT
Electrophysiology
Gastroenterology
Heme/Onc
Urology
Gastroenterology
Cardiology
Heart Failure
Transplant Nephrology
Dental
SICU
Transfusion Medicine
Transplant ID
Colorectal Surgery
Dermatology
Neurology
Wound Care
Cardiology
ENT

## 2025-01-03 NOTE — PROGRESS NOTE ADULT - ATTENDING COMMENTS
Patient seen and examined    Case discussed with dr Mccabe    mental status remains poor and plans for plasmapheresis to see if mental status will improve  continues with CVVH  continues with high output from his ostomy site  large sacral decubitus  skin changes in scrotal region    recurrent bacteremia with GI organisms consistent with gut translocation  stenotrophomonas colonization of respiratory tract  remains on meropenem/Caspo/flagyl  linezolid held in the setting of low plts  would administer Vancomycin in CVVH dosing given E.faecalis bacteremia x 2weeks post clearance    Wes Carrero MD  Can be called via Teams  After 5pm/weekends [phone] Patient seen and examined    Case discussed with dr Mccabe    mental status remains poor and plans for plasmapheresis to see if mental status will improve  continues with CVVH  continues with high output from his ostomy site  large sacral decubitus  skin changes in scrotal region    recurrent bacteremia with GI organisms consistent with gut translocation  stenotrophomonas colonization of respiratory tract  remains on meropenem/Caspo/flagyl- plans to change meropenem to imipenem for possible increased enterococcal coverage  linezolid held in the setting of low plts  would administer Vancomycin in CVVH dosing given E.faecalis bacteremia x 2weeks post clearance    Wes Carrero MD  Can be called via Teams  After 5pm/weekends [phone]

## 2025-01-03 NOTE — PROGRESS NOTE ADULT - ASSESSMENT
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 Post op course complicated by acute hypoxic respiratory failure requiring intubation multiple times, most recently on 12/7 with conversion to tracheostomy on 12/17, e.coli bacteremia with RTOR on 12/7 for concern for worsening septic shock and cardiogenic shock s/p balloon pump placement and total abdominal colectomy in setting of ischemic bowel s/p OR on 12/9 for ileostomy creation, and olirugirc MORAIMA requiring CRRT and now intermittent HD.    Diagnosed with pneumonia secondary to Stenotrophomonas    Also with growth of Enterococcus faecalis from abdominal drains and urine culture    More fever and shock event on 12/29/24 likely 2/2 GIB    UA (12/19) 2 WBC  BCx (12/23) Clostridium paraputrificum  Bronch Cx (12/23) NGTD    CT Chest (12/23) Bibasilar groundglass and tree in bud opacities which may represent distal airway impaction versus pneumonia.    CT A/P (12/23) Peripheral wedge-shaped areas of hypoattenuation involving the superior aspect of the spleen, suggestive of splenic infarcts (12-59). Mildly dilated loops of proximal small bowel without transition point, likely ileus.    Testicular US (12/23) No appreciable arterial flow with very limited venous flow in in the testes bilaterally. Interval decrease in diffuse scrotal edema. Small bilateral hydroceles.    CT Pelvis (12/25) Moderate diffuse subcutaneous/scrotal edema without defined collection or subcutaneous air.    BCX (12/28): Gram variable rods (Blood PCR neg)    Antibiotic Course:  Meropenem ( 11/22-11/29, 11/22-11/29, 12/16-)   Minocycline (12/21-1/1)  Bactrim (11/16-11/24, 12/8-12/11, 12/21-)  Fluconazole (11/16-12/2, 12/12-12/16)   Caspofungin ( 12/6-12/11, 12/17-)  Cefepime (12/10-12/16)   Metronidazole (12/10-12/14)   Ganciclovir (12/7-12/13)   Linezolid (11/23-11/26, 12/6-12/11, 12/28-12/29)   Atovaquone (11/29-12/6)   Zosyn (11/20-11/22,12/6)   Tobramycin (12/6)   Valganciclovir (11/6-12/4)    #Positive Blood Culture (12/23 Clostridium paraputrificum) (12/28: Gram variable rods -Blood PCR neg)  Clostridium paraputrificum is generally penicillin and metronidazole susceptible  Cont Meropenem and Flagyl    #Leukocytosis, Fever, Shock, Transaminitis, Stenotrophomonas Pneumonia  --If further worsening shock overnight would repeat blood cultures and administer Tobramycin 600 mg IV x1 (7 mg/kg based on adjusted body weight).   --CT C/A/P showing Small bilateral pleural effusions with associated compressive atelectasis, increased since prior exam. Mild distention and mural thickening of small bowel of concern for enteritis. Status post liver transplant and colectomy with stable postsurgical changes.  --s/p 10 day course of Minocycline 200 mg IV Q12H (12/21 >1/1)   --Continue Meropenem 1g IV Q12H and flagyl 400mg Q8  --Doubt need for Caspofungin; no growth of fungal organisms  -Decrease immunosuppression if possible     #Liver Transplant Recipient, Prophylactic Antibiotic  --Repeat CMV detectable on 12/31  --Given thrombocytopenia can consider holding Bactrim or now  Cytomegalovirus By PCR: Det <34.5 IU/mL (12.23.24 @ 06:15)  Cytomegalovirus By PCR: 537 IU/mL (12.31.24 @ 13:36)  will need to restart induction therapy with ganciclovir IV adjusted for CVVH dosing        Discussed w/ Dr. 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 Post op course complicated by acute hypoxic respiratory failure requiring intubation multiple times, most recently on 12/7 with conversion to tracheostomy on 12/17, e.coli bacteremia with RTOR on 12/7 for concern for worsening septic shock and cardiogenic shock s/p balloon pump placement and total abdominal colectomy in setting of ischemic bowel s/p OR on 12/9 for ileostomy creation, and olirugirc MORAIMA requiring CRRT and now intermittent HD.    Diagnosed with pneumonia secondary to Stenotrophomonas    Also with growth of Enterococcus faecalis from abdominal drains and urine culture    More fever and shock event on 12/29/24 likely 2/2 GIB    UA (12/19) 2 WBC  BCx (12/23) Clostridium paraputrificum  Bronch Cx (12/23) NGTD    CT Chest (12/23) Bibasilar groundglass and tree in bud opacities which may represent distal airway impaction versus pneumonia.    CT A/P (12/23) Peripheral wedge-shaped areas of hypoattenuation involving the superior aspect of the spleen, suggestive of splenic infarcts (12-59). Mildly dilated loops of proximal small bowel without transition point, likely ileus.    Testicular US (12/23) No appreciable arterial flow with very limited venous flow in in the testes bilaterally. Interval decrease in diffuse scrotal edema. Small bilateral hydroceles.    CT Pelvis (12/25) Moderate diffuse subcutaneous/scrotal edema without defined collection or subcutaneous air.    BCX (12/28): Gram variable rods (Blood PCR neg)    Antibiotic Course:  Meropenem ( 11/22-11/29, 11/22-11/29, 12/16-)   Minocycline (12/21-1/1)  Bactrim (11/16-11/24, 12/8-12/11, 12/21-)  Fluconazole (11/16-12/2, 12/12-12/16)   Caspofungin ( 12/6-12/11, 12/17-)  Cefepime (12/10-12/16)   Metronidazole (12/10-12/14)   Ganciclovir (12/7-12/13)   Linezolid (11/23-11/26, 12/6-12/11, 12/28-12/29)   Atovaquone (11/29-12/6)   Zosyn (11/20-11/22,12/6)   Tobramycin (12/6)   Valganciclovir (11/6-12/4)    #Positive Blood Culture (12/23 Clostridium paraputrificum) (12/28: Gram variable rods -Blood PCR neg)  Clostridium paraputrificum is generally penicillin and metronidazole susceptible  Cont Meropenem and Flagyl    #Leukocytosis, Fever, Shock, Transaminitis, Stenotrophomonas Pneumonia  --If further worsening shock overnight would repeat blood cultures and administer Tobramycin 600 mg IV x1 (7 mg/kg based on adjusted body weight).   --CT C/A/P showing Small bilateral pleural effusions with associated compressive atelectasis, increased since prior exam. Mild distention and mural thickening of small bowel of concern for enteritis. Status post liver transplant and colectomy with stable postsurgical changes.  --s/p 10 day course of Minocycline 200 mg IV Q12H (12/21 >1/1)   --Continue flagyl 400mg Q8, will switch Meropenem 1g IV Q12H to Imipenem 500mg Q6  --Doubt need for Caspofungin; no growth of fungal organisms  -Decrease immunosuppression if possible     #Liver Transplant Recipient, Prophylactic Antibiotic  --Repeat CMV detectable on 12/31  --Given thrombocytopenia can consider holding Bactrim or now  Cytomegalovirus By PCR: Det <34.5 IU/mL (12.23.24 @ 06:15)  Cytomegalovirus By PCR: 537 IU/mL (12.31.24 @ 13:36)  --will need to restart induction therapy with ganciclovir IV adjusted for CVVH dosing    Discussed w/ Dr. Alise Mccabe  ID Fellow

## 2025-01-03 NOTE — PROGRESS NOTE ADULT - ASSESSMENT
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 Post-op course c/b worsening mental status and acute hypoxic respiratory failure requiring multiple intubations/extubations, currently extubated (as of 11/26/24) and colonic ileus s/p several rounds of Relistor and Neostigmine with NGT and rectal tube currently in place now with septic shock due to ischemia bowel s/p total colectomy with ostomy creation.  Transplant nephrology consulted for metabolic acidosis and MORAIMA.    1. s/p OLT 11/15/24 with oliguric MORAIMA in setting of septic shock.  Likely hemodynamically mediated in setting of cardiogenic and septic shock on multiple vasopressors and prior IABP.   - CT AP non-con: Bilateral renal cysts including cysts with peripheral calcification in the left kidney.  - Initiated on CRRT 12/7/24- 12/15/24 at 1AM stopped. s/p IHD 12/18/24 however required levophed.   - Now back on CRRT, remains oliguric with 33 cc UOP in 24 hour. Pt. remains on vasopressor support with phenyl, and midodrine 10mg TID.   - Will continue CRRT for now   - Dose medication per CRRT. Monitor BP and labs.     If you have any questions, please feel free to contact me:  Magaly Tello MD PGY-4  Nephrology Fellow  [employer] Teams (Preferred)/ Pager [phone]   (After 5pm or on weekends please page the on-call fellow)

## 2025-01-03 NOTE — PROGRESS NOTE ADULT - SUBJECTIVE AND OBJECTIVE BOX
24 HOUR EVENTS:  - 1 U PRBC for hgb < 8  - lomotil increased to 2 tabs Q6  - albumin frequency increased to Q6  - CTH, and CT C/A/P performed  - 1 U Plts for < 20  - BCx positive for clostridium paraputrificum     NEURO  Exam: arouses to name and voice  Meds: oxyCODONE    Solution 5 milliGRAM(s) Oral every 4 hours PRN Severe Pain (7 - 10)  oxyCODONE    Solution 2.5 milliGRAM(s) Oral every 4 hours PRN Moderate Pain (4 - 6)      RESPIRATORY  RR: 25 (01-03-25 @ 00:15) (17 - 30)  SpO2: 98% (01-03-25 @ 00:15) (93% - 100%)  Exam: trached   Mechanical Ventilation: Mode: AC/ CMV (Assist Control/ Continuous Mandatory Ventilation), RR (machine): 14, RR (patient): 24, TV (machine): 450, FiO2: 30, PEEP: 5, ITime: 1, MAP: 8.5, PIP: 15  ABG - ( 02 Jan 2025 17:31 )  pH: 7.37  /  pCO2: 36    /  pO2: 112   / HCO3: 21    / Base Excess: -4.0  /  SaO2: 99.6      Meds: albuterol/ipratropium for Nebulization 3 milliLiter(s) Nebulizer every 6 hours PRN Shortness of Breath and/or Wheezing  buDESOnide    Inhalation Suspension 0.5 milliGRAM(s) Inhalation every 12 hours      CARDIOVASCULAR  HR: 102 (01-03-25 @ 00:15) (85 - 104)  BP: 119/56 (01-02-25 @ 07:45) (119/56 - 119/56)  BP(mean): 81 (01-02-25 @ 07:45) (81 - 81)  ABP: 96/45 (01-03-25 @ 00:15) (92/45 - 147/64)  ABP(mean): 64 (01-03-25 @ 00:15) (61 - 98)      Exam:   Cardiac Rhythm:   Perfusion     [x ]Adequate   [ ]Inadequate  Mentation   [ ]Normal       [x ]Reduced  Extremities  [x ]Warm         [ ]Cool  Volume Status [ ]Hypervolemic [ x]Euvolemic [ ]Hypovolemic  Meds: midodrine 10 milliGRAM(s) Oral every 8 hours  phenylephrine    Infusion 0.1 MICROgram(s)/kG/Min IV Continuous <Continuous>      GI/NUTRITION  Exam: soft, NT/ND  Diet: tube feeds  Meds: diphenoxylate/atropine 2 Tablet(s) Oral every 6 hours  pantoprazole  Injectable 40 milliGRAM(s) IV Push every 12 hours  ursodiol Suspension 300 milliGRAM(s) Oral every 12 hours      GENITOURINARY  I&O's Detail    01-01 @ 07:01 - 01-02 @ 07:00  --------------------------------------------------------  IN:    Albumin 5%  - 500 mL: 1500 mL    Enteral Tube Flush: 430 mL    IV PiggyBack: 50 mL    IV PiggyBack: 810 mL    Nepro with Carb Steady: 1320 mL    Phenylephrine: 338 mL    Platelets - Single Donor: 225 mL    PRBCs (Packed Red Blood Cells): 300 mL    Vasopressin: 108 mL  Total IN: 5081 mL    OUT:    Bulb (mL): 605 mL    Bulb (mL): 10 mL    Ileostomy (mL): 2300 mL    Indwelling Catheter - Urethral (mL): 150 mL    Other (mL): 1613 mL    VAC (Vacuum Assisted Closure) System (mL): 0 mL  Total OUT: 4678 mL    Total NET: 403 mL      01-02 @ 07:01 - 01-03 @ 00:24  --------------------------------------------------------  IN:    Albumin 5%  - 250 mL: 500 mL    Albumin 5%  - 500 mL: 1500 mL    Enteral Tube Flush: 380 mL    IV PiggyBack: 150 mL    IV PiggyBack: 100 mL    IV PiggyBack: 650 mL    Nepro with Carb Steady: 935 mL    Phenylephrine: 116.7 mL    Platelets - Single Donor: 225 mL    PRBCs (Packed Red Blood Cells): 300 mL    Vasopressin: 36 mL  Total IN: 4892.7 mL    OUT:    Bulb (mL): 0 mL    Bulb (mL): 100 mL    Ileostomy (mL): 1400 mL    Indwelling Catheter - Urethral (mL): 23 mL    Other (mL): 1176 mL    VAC (Vacuum Assisted Closure) System (mL): 0 mL  Total OUT: 2699 mL    Total NET: 2193.7 mL          01-02    139  |  107  |  21  ----------------------------<  145[H]  3.8   |  19[L]  |  <0.30[L]    Ca    8.3[L]      02 Jan 2025 17:42  Phos  2.7     01-02  Mg     2.4     01-02    TPro  3.7[L]  /  Alb  3.2[L]  /  TBili  24.4[H]  /  DBili  >10.0[H]  /  AST  86[H]  /  ALT  201[H]  /  AlkPhos  100  01-02    Meds: albumin human  5% IVPB 500 milliLiter(s) IV Intermittent every 6 hours      HEMATOLOGIC  Meds:                         8.8    3.94  )-----------( 33       ( 02 Jan 2025 18:09 )             25.7     PT/INR - ( 02 Jan 2025 17:42 )   PT: 28.7 sec;   INR: 2.54 ratio         PTT - ( 02 Jan 2025 17:42 )  PTT:72.0 sec    INFECTIOUS DISEASES  T(C): 36 (01-02-25 @ 19:00), Max: 36.8 (01-02-25 @ 03:00)  Wt(kg): --  WBC Count: 3.94 K/uL (01-02 @ 18:09)  WBC Count: 4.06 K/uL (01-02 @ 13:01)  WBC Count: 4.42 K/uL (01-02 @ 07:02)  WBC Count: 4.74 K/uL (01-02 @ 00:47)    Recent Cultures:  Specimen Source: .Blood BLOOD, 12-28 @ 18:10; Results   Growth in anaerobic bottle: Clostridium paraputrificum "Susceptibilities  not performed"  Direct identification is available within approximately 3-5  hours either by Blood Panel Multiplexed PCR or Direct  MALDI-TOF. Details: [email or web address][!]; Gram Stain:   Growth in anaerobic bottle: Gram Variable Rods[!]; Organism: Blood Culture PCR[!]  Specimen Source: .Blood BLOOD, 12-28 @ 12:15; Results   No growth at 5 days; Gram Stain: --; Organism: --    Meds: caspofungin IVPB 50 milliGRAM(s) IV Intermittent every 24 hours  caspofungin IVPB      cycloSPORINE  , modified (GENGRAF) Solution 75 milliGRAM(s) Oral <User Schedule>  cycloSPORINE  , modified (GENGRAF) Solution 50 milliGRAM(s) Oral <User Schedule>  ganciclovir IVPB 250 milliGRAM(s) IV Intermittent every 24 hours  meropenem  IVPB 1000 milliGRAM(s) IV Intermittent every 8 hours  metroNIDAZOLE  IVPB 500 milliGRAM(s) IV Intermittent every 8 hours  trimethoprim  40 mG/sulfamethoxazole 200 mG Suspension 80 milliGRAM(s) Enteral Tube daily      ENDOCRINE  Capillary Blood Glucose    Meds: insulin lispro (ADMELOG) corrective regimen sliding scale   SubCutaneous every 6 hours  methylPREDNISolone sodium succinate Injectable 32 milliGRAM(s) IV Push <User Schedule>      ACCESS DEVICES:  [ ] Peripheral IV  [ ] Central Venous Line		[ ] R	[ ] L	[ ] IJ	[ ] Fem	[ ] SC	Placed:   [ ] Arterial Line			[ ] R	[ ] L	[ ] Fem	[ ] Rad	[ ] Ax	Placed:   [ ] PICC:					[ ] Mediport  [ ] Urinary Catheter, Date Placed:   [ ] Necessity of urinary, arterial, and venous catheters discussed    OTHER MEDICATIONS:  chlorhexidine 0.12% Liquid 15 milliLiter(s) Oral Mucosa every 12 hours  chlorhexidine 2% Cloths 1 Application(s) Topical <User Schedule>  CRRT Treatment    <Continuous>  FIRST- Mouthwash  BLM 10 milliLiter(s) Swish and Spit every 8 hours PRN  mupirocin 2% Ointment 1 Application(s) Topical every 12 hours  nystatin Powder 1 Application(s) Topical every 12 hours  Phoxillum Filtration BK 4 / 2.5 5000 milliLiter(s) CRRT <Continuous>  Phoxillum Filtration BK 4 / 2.5 5000 milliLiter(s) CRRT <Continuous>  PrismaSATE Dialysate BGK 4 / 2.5 5000 milliLiter(s) CRRT <Continuous>      IMAGING:

## 2025-01-03 NOTE — PROGRESS NOTE ADULT - NS ATTEND AMEND GEN_ALL_CORE FT
Remains critically ill with concerns that liver is not compensating after most recent insult last week.  His bilirubin and INR continue to climb.  He has a poor mental status.   I had a long conversation with the patient's family about options for management, we will proceed with pheresis/FFP to correct his hyperbilirubinemia and elevated INR. We discussed that this would only temporize his numbers, but likely would not improve his liver function.  His sepsis appears controlled, recent blood cultures neg on abx.  He has an acceptable CO/CI.

## 2025-01-03 NOTE — CONSULT NOTE ADULT - CONSULT REQUESTED DATE/TIME
15-Nov-2024 19:30
24-Dec-2024 21:12
03-Jan-2025 12:12
03-Jan-2025 12:54
12-Dec-2024 15:39
13-Dec-2024 10:34
16-Dec-2024 16:13
18-Nov-2024 06:00
20-Nov-2024
30-Nov-2024 18:20
06-Dec-2024 17:05
22-Nov-2024
29-Nov-2024 16:42
23-Nov-2024 17:23
09-Dec-2024
20-Nov-2024 17:09
30-Nov-2024 02:26
08-Dec-2024 11:00

## 2025-01-03 NOTE — PROGRESS NOTE ADULT - SUBJECTIVE AND OBJECTIVE BOX
[facility] DIVISION OF KIDNEY DISEASES AND HYPERTENSION -- FOLLOW UP NOTE  --------------------------------------------------------------------------------  Chief Complaint: s/p OLT 11/15/24 with oliguric MORAIMA    24 hour events/subjective: Pt. seen and examined at bedside earlier today in SICU. Pt. remains on trach to vent. Pt. is oliguric with 33 cc UOP in 24 hours.         PAST HISTORY  --------------------------------------------------------------------------------  No significant changes to PMH, PSH, FHx, SHx, unless otherwise noted    ALLERGIES & MEDICATIONS  --------------------------------------------------------------------------------  Allergies    No Known Allergies    Intolerances      Standing Inpatient Medications  albumin human  5% IVPB 500 milliLiter(s) IV Intermittent every 6 hours  buDESOnide    Inhalation Suspension 0.5 milliGRAM(s) Inhalation every 12 hours  caspofungin IVPB      caspofungin IVPB 50 milliGRAM(s) IV Intermittent every 24 hours  chlorhexidine 0.12% Liquid 15 milliLiter(s) Oral Mucosa every 12 hours  chlorhexidine 2% Cloths 1 Application(s) Topical <User Schedule>  CRRT Treatment    <Continuous>  cycloSPORINE  , modified (GENGRAF) Solution 75 milliGRAM(s) Oral <User Schedule>  cycloSPORINE  , modified (GENGRAF) Solution 50 milliGRAM(s) Oral <User Schedule>  diphenoxylate/atropine 2 Tablet(s) Oral every 6 hours  ganciclovir IVPB 250 milliGRAM(s) IV Intermittent every 24 hours  insulin lispro (ADMELOG) corrective regimen sliding scale   SubCutaneous every 6 hours  loperamide Liquid 2 milliGRAM(s) Enteral Tube every 6 hours  meropenem  IVPB 1000 milliGRAM(s) IV Intermittent every 8 hours  methylPREDNISolone sodium succinate Injectable 32 milliGRAM(s) IV Push <User Schedule>  metroNIDAZOLE  IVPB 500 milliGRAM(s) IV Intermittent every 8 hours  midodrine 10 milliGRAM(s) Oral every 8 hours  mupirocin 2% Ointment 1 Application(s) Topical every 12 hours  nystatin Powder 1 Application(s) Topical every 12 hours  pantoprazole  Injectable 40 milliGRAM(s) IV Push every 12 hours  phenylephrine    Infusion 0.1 MICROgram(s)/kG/Min IV Continuous <Continuous>  Phoxillum Filtration BK 4 / 2.5 5000 milliLiter(s) CRRT <Continuous>  Phoxillum Filtration BK 4 / 2.5 5000 milliLiter(s) CRRT <Continuous>  phytonadione   Solution 5 milliGRAM(s) Enteral Tube every 24 hours  PrismaSATE Dialysate BK 0 / 3.5 5000 milliLiter(s) CRRT <Continuous>  trimethoprim  40 mG/sulfamethoxazole 200 mG Suspension 80 milliGRAM(s) Enteral Tube daily  ursodiol Suspension 300 milliGRAM(s) Oral every 12 hours  vasopressin Infusion 0.03 Unit(s)/Min IV Continuous <Continuous>    PRN Inpatient Medications  albuterol/ipratropium for Nebulization 3 milliLiter(s) Nebulizer every 6 hours PRN  FIRST- Mouthwash  BLM 10 milliLiter(s) Swish and Spit every 8 hours PRN  oxyCODONE    Solution 5 milliGRAM(s) Oral every 4 hours PRN  oxyCODONE    Solution 2.5 milliGRAM(s) Oral every 4 hours PRN      REVIEW OF SYSTEMS  --------------------------------------------------------------------------------  Gen: No fevers/chills  Respiratory: No dyspnea, cough,   CV: No chest pain, PND, orthopnea  GI: No abdominal pain, diarrhea, constipation, nausea, vomiting  Transplant: No pain  : No increased frequency, dysuria, hematuria   MSK: No edema  Neuro: No dizziness/lightheadedness    All other systems were reviewed and are negative, except as noted.    VITALS/PHYSICAL EXAM  --------------------------------------------------------------------------------  T(C): 37 (01-03-25 @ 08:00), Max: 37 (01-03-25 @ 08:00)  HR: 102 (01-03-25 @ 11:00) (96 - 109)  BP: --  RR: 31 (01-03-25 @ 11:00) (19 - 36)  SpO2: 100% (01-03-25 @ 11:00) (96% - 100%)  Wt(kg): --        01-02-25 @ 07:01  -  01-03-25 @ 07:00  --------------------------------------------------------  IN: 6263.9 mL / OUT: 4902 mL / NET: 1361.9 mL    01-03-25 @ 07:01  -  01-03-25 @ 12:28  --------------------------------------------------------  IN: 373 mL / OUT: 445 mL / NET: -72 mL      Physical Exam:  Gen: Unresponsive, on vent via tracheostomy  HEENT: Icterus present, +NGT  Abdomen: + ileostomy with liquid brown stool, VAC dressing present   MSK: Minimal edema   Skin: Superficial skin ulceration b/l thighs/dark necrotic appearing scrotum.   Vascular: Right IJ temporary dialysis catheter       LABS/STUDIES  --------------------------------------------------------------------------------              8.5    4.93  >-----------<  23       [01-03-25 @ 06:34]              24.9     138  |  105  |  21  ----------------------------<  146      [01-03-25 @ 06:34]  4.2   |  18  |  <0.30        Ca     8.3     [01-03-25 @ 06:34]      Mg     2.4     [01-03-25 @ 06:34]      Phos  2.9     [01-03-25 @ 06:34]    TPro  3.8  /  Alb  3.4  /  TBili  25.8  /  DBili  x   /  AST  78  /  ALT  193  /  AlkPhos  87  [01-03-25 @ 06:34]    PT/INR: PT 33.9 , INR 3.01       [01-03-25 @ 06:34]  PTT: 92.8       [01-03-25 @ 06:34]          [01-03-25 @ 00:32]    Creatinine Trend:  SCr <0.30 [01-03 @ 06:34]  SCr <0.30 [01-03 @ 00:32]  SCr <0.30 [01-02 @ 17:42]  SCr <0.30 [01-02 @ 13:01]  SCr <0.30 [01-02 @ 07:01]        Urinalysis - [01-03-25 @ 06:34]      Color  / Appearance  / SG  / pH       Gluc 146 / Ketone   / Bili  / Urobili        Blood  / Protein  / Leuk Est  / Nitrite       RBC  / WBC  / Hyaline  / Gran  / Sq Epi  / Non Sq Epi  / Bacteria       Vitamin D (25OH) <6.0      [11-21-24 @ 08:32]  TSH 0.68      [12-12-24 @ 12:27]  Lipid: chol 114, , HDL 47, LDL --      [04-24-24 @ 06:48]          IMAGING/RADIOLOGY: Reviewed.

## 2025-01-03 NOTE — CONSULT NOTE ADULT - CONSULT REASON
Broken
AF with RVR post OLT
NGT placement
NSTEMI
scrotal edema w/ skin breakdown
Therapeutic Plasma Exchange (TPE)
altered mental status
sacral/bilateral buttocks skin damage
New LV dysfunction
Thrombocytopenia
post op ileus
scrotal edema and skin breakdown
hemodynamic monitoring s/p liver transplant
Chest pain after liver transplant
High ileostomy output
Ileus
MORAIMA, metabolic acidosis s/p OLT 11/15/24.
sepsis post transplant  ? aspiration pneumonia

## 2025-01-03 NOTE — CONSULT NOTE ADULT - REASON FOR ADMISSION
Liver Transplant
s/p liver transplant
Liver Transplant

## 2025-01-03 NOTE — PROGRESS NOTE ADULT - SUBJECTIVE AND OBJECTIVE BOX
HPI:  Patient seen and examined at bedside in SICU.    Review Of Systems:        Unable to assess       Medications:  albuterol/ipratropium for Nebulization 3 milliLiter(s) Nebulizer every 6 hours PRN  buDESOnide    Inhalation Suspension 0.5 milliGRAM(s) Inhalation every 12 hours  caspofungin IVPB      caspofungin IVPB 50 milliGRAM(s) IV Intermittent every 24 hours  chlorhexidine 0.12% Liquid 15 milliLiter(s) Oral Mucosa every 12 hours  chlorhexidine 2% Cloths 1 Application(s) Topical <User Schedule>  CRRT Treatment    <Continuous>  cycloSPORINE  , modified (GENGRAF) Solution 75 milliGRAM(s) Oral <User Schedule>  cycloSPORINE  , modified (GENGRAF) Solution 50 milliGRAM(s) Oral <User Schedule>  diphenoxylate/atropine 2 Tablet(s) Oral every 6 hours  FIRST- Mouthwash  BLM 10 milliLiter(s) Swish and Spit every 8 hours PRN  ganciclovir IVPB 250 milliGRAM(s) IV Intermittent every 24 hours  imipenem/cilastatin  IVPB 500 milliGRAM(s) IV Intermittent every 6 hours  insulin lispro (ADMELOG) corrective regimen sliding scale   SubCutaneous every 6 hours  loperamide Liquid 2 milliGRAM(s) Enteral Tube every 6 hours  methylPREDNISolone sodium succinate Injectable 32 milliGRAM(s) IV Push <User Schedule>  metroNIDAZOLE  IVPB 500 milliGRAM(s) IV Intermittent every 8 hours  midodrine 10 milliGRAM(s) Oral every 8 hours  mupirocin 2% Ointment 1 Application(s) Topical every 12 hours  nystatin Powder 1 Application(s) Topical every 12 hours  oxyCODONE    Solution 5 milliGRAM(s) Oral every 4 hours PRN  oxyCODONE    Solution 2.5 milliGRAM(s) Oral every 4 hours PRN  pantoprazole  Injectable 40 milliGRAM(s) IV Push every 12 hours  Phoxillum Filtration BK 4 / 2.5 5000 milliLiter(s) CRRT <Continuous>  Phoxillum Filtration BK 4 / 2.5 5000 milliLiter(s) CRRT <Continuous>  phytonadione   Solution 5 milliGRAM(s) Enteral Tube every 24 hours  PrismaSATE Dialysate BK 0 / 3.5 5000 milliLiter(s) CRRT <Continuous>  trimethoprim  40 mG/sulfamethoxazole 200 mG Suspension 80 milliGRAM(s) Enteral Tube daily  ursodiol Suspension 300 milliGRAM(s) Oral every 12 hours    PAST MEDICAL & SURGICAL HISTORY:  Diabetes      Transaminitis      Paroxysmal atrial fibrillation      Depression      BPH (benign prostatic hyperplasia)      Hypertension      Chronic atrial fibrillation      Coronary artery disease      Hepatocellular carcinoma      DM (diabetes mellitus)      HTN (hypertension)      Paroxysmal atrial fibrillation      Cirrhosis      HCC (hepatocellular carcinoma)      History of BPH      History of laparoscopic cholecystectomy      History of lumbar laminectomy      H/O prior ablation treatment      H/O percutaneous left heart catheterization        Vitals:  T(C): 36.3 (01-04-25 @ 07:00), Max: 37 (01-03-25 @ 08:00)  HR: 105 (01-04-25 @ 07:15) (97 - 118)  BP: 152/67 (01-04-25 @ 07:15) (114/59 - 154/69)  BP(mean): 96 (01-04-25 @ 07:15) (83 - 101)  RR: 23 (01-04-25 @ 07:15) (20 - 40)  SpO2: 95% (01-04-25 @ 07:15) (95% - 100%)  Wt(kg): --  Daily     Daily   I&O's Summary    03 Jan 2025 07:01  -  04 Jan 2025 07:00  --------------------------------------------------------  IN: 7389.5 mL / OUT: 4275 mL / NET: 3114.5 mL        Physical Exam:  Appearance: Critically ill  Eyes: PERRL, EOMI, pink conjunctiva  HENT: +Trach, +NGT  Cardiovascular: RRR, S1, S2  Respiratory: Clear to auscultation bilaterally  Gastrointestinal: soft, non-tender, non-distended with normal bowel sounds  Musculoskeletal: No clubbing; no joint deformity   Neurologic: Non-focal  Skin: sacral decubitus ulcer and scrotal necrosis noted                        8.8    2.78  )-----------( 30       ( 04 Jan 2025 02:08 )             25.7     01-04    140  |  106  |  40[H]  ----------------------------<  273[H]  3.7   |  18[L]  |  0.43[L]    Ca    8.9      04 Jan 2025 06:56  Phos  3.2     01-04  Mg     2.2     01-04    TPro  4.4[L]  /  Alb  3.2[L]  /  TBili  17.2[H]  /  DBili  x   /  AST  52[H]  /  ALT  112[H]  /  AlkPhos  72  01-04    PT/INR - ( 04 Jan 2025 00:15 )   PT: 20.4 sec;   INR: 1.78 ratio         PTT - ( 04 Jan 2025 00:15 )  PTT:37.3 sec        Cardiovascular Diagnostic Testing:  ECG: sinus rhythm    Echo: < from: Intra-Operative Transesophageal Echo W or WO Ultrasound Enhancing Agent (11.15.24 @ 07:46) >  --------------------------------------------------------------------------------  PRE-BYPASS FINDINGS     Left Ventricle:  Left ventricular ejection fraction is estimated at 60 to 65%. Normal left ventricularwall thickness. The left ventricular systolic function is normal There are no regional wall motion abnormalities seen.     Right Ventricle:  The right ventricular cavity is normal in size, with normal wall thickness and right ventricular systolic function is normal. Right ventricular systolic function is normal.     Left Atrium:  The left atrium is normal in size. No thrombus visualized in left atrial appendage.     Right Atrium:  The right atrium is normal in size. The right atrium is normal in size.     Interatrial Septum:  No PFO visualized with color flow doppler.     Aortic Valve:  The aortic valve appears trileaflet. There is no aortic valve stenosis. There is trace aortic regurgitation.     Mitral Valve:  There is no mitral valve stenosis. There is no mitral valve stenosis. There is trace mitral regurgitation.     Tricuspid Valve:  There is no evidence of tricuspid stenosis. There is trace tricuspid regurgitation.     Pericardium:  No pericardial effusion seen.     Post-Bypass:  S/P OLT. Under current loading conditions, hyperdynamic left ventricular systolic function, EF: 70-75% by visual estimate, no RWMA. Normal right ventricular systolic function. All other findings unchanged. Probe removed atraumatically, no blood. The patient tolerated the procedure well and without complications. Permanent recorded images are stored in the medical record.     Electronically signed by James Villareal on 11/15/2024 at 2:18:16 PM         *** Final ***    < end of copied text >      < from: TTE Limited W or WO Ultrasound Enhancing Agent (12.11.24 @ 09:28) >  _______________________________________________________________________________________     CONCLUSIONS:      1. Endocardial visualization enhanced with Definity (Ultrasound Enhancing Agent).   2. Left ventricular systolic function is normal with an ejection fraction visually estimated at 55 to 60 %.   3. Enlarged right ventricular cavity size and mildly reduced right ventricular systolic function.   4. Device lead is visualized in the right heart.    ________________________________________________________________________________________    < end of copied text >      Stress Testing:     Cath: 4/24/2024, patient had his LHC repeated with Ben Villareal MD at [facility].  Cath showed multivessel coronary artery disease, but the lesions previously noted were FFR negative.  He continues to have MIRTA 3 flow throughout.    Interpretation of Telemetry: Sinus     Imaging:

## 2025-01-03 NOTE — PROGRESS NOTE ADULT - ATTENDING COMMENTS
SICU ATTENDING ATTESTATION    I have seen and examined this patient on multidisciplinary SICU rounds thismorning. I have reviewed all new labs, imaging and reports. I have participated in formulating the plan for the day, and have read and agree with the history, ROS, exam, assessment and plan as stated above, with my additions listed below:     Patient's two sons joined us on rounds thismorning.     N: no improvement, no sedation. RHCT yesterday showed stable infarct.   R: continues to tolerate trach collar during the day, back on the vent with full support overnight.   C: Tachycardic --> changed from Levo to Aldo. Vaso is off. Tachycardia has resolved to 90's on rounds. On Midodrine 10 TID.   GI: on TF at goal.   Ostomy output remains high despite starting and increasing Lomotil to 2tabs TID. Will add Immodium today. GI CPR from stool was negative for c.diff and other viral infections. Will ask colorectal to follow up for high ostomy output.   Tbili remains high (climbing slowly) with concordant increase in INR and intermittent requirements for platelet transfusion for thrombocytopenia, all suggesting graft insufficiency. Will plan for plasmaphoresis with ffp to clear tbili and improve INR. Will plan for 3 rounds of treatment. I have discussed this plan with Dr. Hutchinson and we have agreed that if bili and INR continue to rise after plasmphoresis, then there is no other option for recovery of the liver.   Starting oral vitK x 3 days.   No fevers, WBC 4. Continues on same abx without new cultures.   Will need to have arterial line, TLC and vascath all changed as all have been in a long time.     The patient required critical care. Critical care time was indicated due to the patient's inherent instability and high risk for decompensation. E & M work was done by me in multiple 10-15 minute intervals over the course of the day in the ICU. I have coordinated care with multiple teams, and reviewed the medical record, consult notes, ICU flow sheets, cardiac monitoring, mechanical ventilation, medication record, brain and body imaging, and serial sequential labs.       Total time spent in the care of this patient today (excluding procedures & teaching): CCT 45 min                 Over 50% of the total time was spent in discussion and coordination of care with consulting services, dietary and rehab services.       Amanda Hale M.D., M.S.  Division of Acute Care Surgery

## 2025-01-03 NOTE — CHART NOTE - NSCHARTNOTEFT_GEN_A_CORE
Verbal Nutrition Consult - tube feed regimen   Sources: Electronic Medical Record, RN, Team (Rounds)    Chart reviewed, events noted.     Diet, NPO with Tube Feed:   Tube Feeding Modality: Nasogastric  Nepro with Carb Steady (NEPRORTH)  Total Volume for 24 Hours (mL): 1320  Continuous  Starting Tube Feed Rate {mL per Hour}: 55     Every 4 hours  Until Goal Tube Feed Rate (mL per Hour): 55  Tube Feed Duration (in Hours): 24  Tube Feed Start Time: 10:00  Banatrol TF     Qty per Day:  3 (12-31-24 @ 13:48) [Active]    ENTERAL NUTRITION  EN Order Provides: 1320mL of formula, 2336kcal/day (29kcal/kg), 107g protein/day (1.3g/kg), 960mL free water - based on  IBW 80.7kg     NUTRITION EVENTS:   -Trach   -CRRT   -Cyclosporine for immunosuppression   -Solu-medrol; sliding scale insulin   -Aldo for pressor support   -Increased ostomy output: 1.95L (1/02); Lomotil added; Banatrol ordered   -Worsening sacral pressure injury: per Team, unstageable     Plan to change enteral formula to semi-elemental to aid in lowering ostomy output.     ESTIMATED NEEDS:  [x] No change:  Energy:  2178-2582kcal/day (27-32 kcal/kg)  Protein:  97-121g/day (1.2-1.5 g/kg)  Fluid:   ml/day or [X] defer to team  Based on: ideal body weight of  80.7kg     RECOMMENDATIONS:   1. EN: Vital 1.5 @ 65mL x 24hrs providing 1560mL of formula, 2340kcal/day (29kcal/kg), 105g protein/day (1.3g/kg), 1192mL free water - based on 80.7kg   -Defer free water to Team   2. Add Alfredo BID to aid in wound healing   3. Banatrol TID to aid in ostomy output   4. Monitor BG levels closely     RD to remain available.   Malorie Pike, MS, RDN, CDN (Teams)

## 2025-01-03 NOTE — CONSULT NOTE ADULT - CONSULT REQUESTED BY NAME
Dr. Shaji Hutchinson
Eloisa Wesley MD
Dr. Vora
ICU team
Ibrahima Palomino)
SICU
Team
Team
Dr. Vora
SICU
Transplant Surgery
Dr. Quintanilla
primary team
Dr Vora
Eligio Vora MD
Family/Med Team
Primary team
RN

## 2025-01-03 NOTE — PROGRESS NOTE ADULT - SUBJECTIVE AND OBJECTIVE BOX
Follow Up:      Interval History/ROS:Patient is a 74y old  Male who presents with a chief complaint of Liver Transplant (03 Jan 2025 06:00)      REVIEW OF SYSTEMS  Constitutional: No fevers, chills, weight loss or fatigue   Skin: No rash, no phlebitis	  Eyes: No discharge	  ENMT: No sore throat, oral thrush, ulcers or exudate  Respiratory: No cough, no SOB  Cardiovascular:  No chest pain, palpitations or edema   Gastrointestinal: No pain, nausea, vomiting, diarrhea or constipation	  Genitourinary: No dysuria, discharge or flank pain  MSK: No arthralgias or back pain   Neurological: No HA, no weakness, no seizures, no AMS       Allergies  No Known Allergies        ANTIMICROBIALS:    caspofungin IVPB 50 every 24 hours  caspofungin IVPB    ganciclovir IVPB 250 every 24 hours  meropenem  IVPB 1000 every 8 hours  metroNIDAZOLE  IVPB 500 every 8 hours  trimethoprim  40 mG/sulfamethoxazole 200 mG Suspension 80 daily      OTHER MEDS: MEDICATIONS  (STANDING):  albuterol/ipratropium for Nebulization 3 every 6 hours PRN  buDESOnide    Inhalation Suspension 0.5 every 12 hours  cycloSPORINE  , modified (GENGRAF) Solution 75 <User Schedule>  cycloSPORINE  , modified (GENGRAF) Solution 50 <User Schedule>  diphenoxylate/atropine 2 every 6 hours  insulin lispro (ADMELOG) corrective regimen sliding scale  every 6 hours  loperamide Liquid 2 every 6 hours  methylPREDNISolone sodium succinate Injectable 32 <User Schedule>  midodrine 10 every 8 hours  oxyCODONE    Solution 5 every 4 hours PRN  oxyCODONE    Solution 2.5 every 4 hours PRN  pantoprazole  Injectable 40 every 12 hours  phenylephrine    Infusion 0.1 <Continuous>  ursodiol Suspension 300 every 12 hours  vasopressin Infusion 0.03 <Continuous>      Vital Signs Last 24 Hrs  T(F): 98.6 (01-03-25 @ 08:00), Max: 100.8 (12-29-24 @ 12:00)    Vital Signs Last 24 Hrs  HR: 107 (01-03-25 @ 10:00) (96 - 109)  BP: --  RR: 35 (01-03-25 @ 10:00)  SpO2: 100% (01-03-25 @ 10:00) (96% - 100%)  Wt(kg): --    EXAM:  General: Patient in no acute distress   HEENT: NCAT, EOMI, PERRL, no oral lesions  CV: S1+S2, no m/r/g appreciated   Lungs: No respiratory distress, CTAB  Abd: Soft, nontender, no guarding, no rebound tenderness, + bowel sounds   Ext: No cyanosis, no edema  Neuro: Alert and oriented, no focal deficits, CN II-XII grossly intact   Skin: No rash   IV: No phlebitis      Labs:                        8.5    4.93  )-----------( 23       ( 03 Jan 2025 06:34 )             24.9     01-03    138  |  105  |  21  ----------------------------<  146[H]  4.2   |  18[L]  |  <0.30[L]    Ca    8.3[L]      03 Jan 2025 06:34  Phos  2.9     01-03  Mg     2.4     01-03    TPro  3.8[L]  /  Alb  3.4  /  TBili  25.8[H]  /  DBili  x   /  AST  78[H]  /  ALT  193[H]  /  AlkPhos  87  01-03      WBC Trend:  WBC Count: 4.93 (01-03-25 @ 06:34)  WBC Count: 4.63 (01-03-25 @ 00:32)  WBC Count: 3.94 (01-02-25 @ 18:09)  WBC Count: 4.06 (01-02-25 @ 13:01)      Creatine Trend:  Creatinine: <0.30 (01-03)  Creatinine: <0.30 (01-03)  Creatinine: <0.30 (01-02)  Creatinine: <0.30 (01-02)      Liver Biochemical Testing Trend:  Alanine Aminotransferase (ALT/SGPT): 193 *H* (01-03)  Alanine Aminotransferase (ALT/SGPT): 194 *H* (01-03)  Alanine Aminotransferase (ALT/SGPT): 201 *H* (01-02)  Alanine Aminotransferase (ALT/SGPT): 206 *H* (01-02)  Alanine Aminotransferase (ALT/SGPT): 192 *H* (01-02)  Aspartate Aminotransferase (AST/SGOT): 78 (01-03-25 @ 06:34)  Aspartate Aminotransferase (AST/SGOT): 82 (01-03-25 @ 00:32)  Aspartate Aminotransferase (AST/SGOT): 86 (01-02-25 @ 17:42)  Aspartate Aminotransferase (AST/SGOT): 89 (01-02-25 @ 13:01)  Aspartate Aminotransferase (AST/SGOT): 86 (01-02-25 @ 07:01)  Bilirubin Total: 25.8 (01-03)  Bilirubin Total: 25.2 (01-03)  Bilirubin Direct: >10.0 (01-03)  Bilirubin Total: 24.4 (01-02)  Bilirubin Direct: >10.0 (01-02)      Trend LDH  01-03-25 @ 00:32  258[H]  12-26-24 @ 12:33  766[H]  12-10-24 @ 00:29  231  12-09-24 @ 00:32  241  12-08-24 @ 18:30  288[H]      Urinalysis Basic - ( 03 Jan 2025 06:34 )    Color: x / Appearance: x / SG: x / pH: x  Gluc: 146 mg/dL / Ketone: x  / Bili: x / Urobili: x   Blood: x / Protein: x / Nitrite: x   Leuk Esterase: x / RBC: x / WBC x   Sq Epi: x / Non Sq Epi: x / Bacteria: x        MICROBIOLOGY:  Vancomycin Level, Random: <4.0 (12-28 @ 06:19)  Vancomycin Level, Random: 9.0 (12-27 @ 05:52)  Vancomycin Level, Random: 15.0 (12-26 @ 06:24)  Vancomycin Level, Random: 9.3 (12-25 @ 06:10)  Vancomycin Level, Random: 16.6 (12-24 @ 06:25)  Vancomycin Level, Random: 16.3 (12-23 @ 06:15)  Vancomycin Level, Random: 11.4 (12-22 @ 06:17)  Vancomycin Level, Random: 4.4 (12-21 @ 06:30)  Vancomycin Level, Random: 8.5 (12-20 @ 06:25)    MRSA PCR Result.: NotDetec (11-24-24 @ 10:14)      Culture - Blood (collected 02 Jan 2025 00:30)  Source: .Blood BLOOD  Preliminary Report:    No growth at 24 hours    Culture - Blood (collected 01 Jan 2025 01:23)  Source: .Blood BLOOD  Preliminary Report:    No growth at 24 hours    Culture - Blood (collected 28 Dec 2024 18:10)  Source: .Blood BLOOD  Final Report:    Growth in anaerobic bottle: Clostridium paraputrificum "Susceptibilities    not performed"    Direct identification is available within approximately 3-5    hours either by Blood Panel Multiplexed PCR or Direct    MALDI-TOF. Details: [email or web address]  Organism: Blood Culture PCR  Organism: Blood Culture PCR    Sensitivities:      Method Type: PCR      -  Blood PCR Panel: NEG    Culture - Blood (collected 28 Dec 2024 12:15)  Source: .Blood BLOOD  Final Report:    No growth at 5 days    Culture - Body Fluid with Gram Stain (collected 26 Dec 2024 19:36)  Source: Body Fluid  Final Report:    Rare Enterococcus faecalis  Organism: Enterococcus faecalis  Organism: Enterococcus faecalis    Sensitivities:      Method Type: ESAU      -  Ampicillin: S <=2 Predicts results to ampicillin/sulbactam, amoxacillin-clavulanate and  piperacillin-tazobactam.      -  Vancomycin: S 1      -  Gentamicin synergy: R >500      -  Streptomycin synergy: S <=1000    Culture - Body Fluid with Gram Stain (collected 26 Dec 2024 19:33)  Source: Body Fluid  Final Report:    Rare Enterococcus faecalis  Organism: Enterococcus faecalis  Organism: Enterococcus faecalis    Sensitivities:      Method Type: ESAU      -  Ampicillin: S <=2 Predicts results to ampicillin/sulbactam, amoxacillin-clavulanate and  piperacillin-tazobactam.      -  Vancomycin: S 1      -  Gentamicin synergy: R >500      -  Streptomycin synergy: S <=1000    Culture - Blood (collected 24 Dec 2024 03:30)  Source: .Blood BLOOD  Final Report:    No growth at 5 days    Culture - Bronchial (collected 23 Dec 2024 16:37)  Source: Combi-Cath  Final Report:    [employer] consistent with body site    Culture - Blood (collected 23 Dec 2024 16:03)  Source: .Blood BLOOD  Final Report:    Growth in anaerobic bottle: Clostridium paraputrificum "Susceptibilities    not performed"    Direct identification is available within approximately 3-5    hours either by Blood Panel Multiplexed PCR or Direct    MALDI-TOF. Details: [email or web address]  Organism: Blood Culture PCR  Organism: Blood Culture PCR    Sensitivities:      Method Type: PCR      -  Blood PCR Panel: NEG    Culture - Body Fluid with Gram Stain (collected 20 Dec 2024 18:23)  Source: Abdominal Fl  Final Report:    Rare Enterococcus faecalis  Organism: Enterococcus faecalis  Organism: Enterococcus faecalis    Sensitivities:      Method Type: ESAU      -  Ampicillin: S <=2 Predicts results to ampicillin/sulbactam, amoxacillin-clavulanate and  piperacillin-tazobactam.      -  Vancomycin: S 1      HIV-1/2 Combo Result: Nonreact (11-15-24 @ 00:41)      HIV-1 RNA Quantitative, Viral Load: NOT DET. copies/mL (12-18-24 @ 22:14)  HIV-1 Viral Load Result: NOT DET. (12-18-24 @ 22:14)    Cytomegalovirus By PCR: 537 IU/mL (12-31-24 @ 13:36)                COVID-19 PCR: NotDetec (11-15-24 @ 01:34)      Procalcitonin: 2.20 (01-03)  Procalcitonin: 1.78 (01-01)  Procalcitonin: 1.43 (12-30)    C-Reactive Protein: 43 (01-03)  C-Reactive Protein: 46 (01-01)  C-Reactive Protein: 42 (12-30)        Lactate Dehydrogenase, Serum: 258 (01-03)        Blood Gas Arterial, Lactate: 3.1 (01-03 @ 06:30)  Blood Gas Arterial, Lactate: 2.6 (01-03 @ 00:26)  Blood Gas Arterial, Lactate: 3.0 (01-02 @ 17:31)  Blood Gas Arterial, Lactate: 3.3 (01-02 @ 12:25)      RADIOLOGY:  imaging below personally reviewed   Follow Up:      Interval History/ROS:Patient is a 74y old  Male who presents with a chief complaint of Liver Transplant (03 Jan 2025 06:00)  Pt intubated with minimal mental status       Allergies  No Known Allergies        ANTIMICROBIALS:    caspofungin IVPB 50 every 24 hours  caspofungin IVPB    ganciclovir IVPB 250 every 24 hours  meropenem  IVPB 1000 every 8 hours  metroNIDAZOLE  IVPB 500 every 8 hours  trimethoprim  40 mG/sulfamethoxazole 200 mG Suspension 80 daily      OTHER MEDS: MEDICATIONS  (STANDING):  albuterol/ipratropium for Nebulization 3 every 6 hours PRN  buDESOnide    Inhalation Suspension 0.5 every 12 hours  cycloSPORINE  , modified (GENGRAF) Solution 75 <User Schedule>  cycloSPORINE  , modified (GENGRAF) Solution 50 <User Schedule>  diphenoxylate/atropine 2 every 6 hours  insulin lispro (ADMELOG) corrective regimen sliding scale  every 6 hours  loperamide Liquid 2 every 6 hours  methylPREDNISolone sodium succinate Injectable 32 <User Schedule>  midodrine 10 every 8 hours  oxyCODONE    Solution 5 every 4 hours PRN  oxyCODONE    Solution 2.5 every 4 hours PRN  pantoprazole  Injectable 40 every 12 hours  phenylephrine    Infusion 0.1 <Continuous>  ursodiol Suspension 300 every 12 hours  vasopressin Infusion 0.03 <Continuous>      Vital Signs Last 24 Hrs  T(F): 98.6 (01-03-25 @ 08:00), Max: 100.8 (12-29-24 @ 12:00)    Vital Signs Last 24 Hrs  HR: 107 (01-03-25 @ 10:00) (96 - 109)  BP: --  RR: 35 (01-03-25 @ 10:00)  SpO2: 100% (01-03-25 @ 10:00) (96% - 100%)  Wt(kg): --    EXAM:  General: Patient in no acute distress, intubated   CV: S1+S2, no m/r/g appreciated   Lungs: No respiratory distress, CTAB  Abd: Soft, no guarding, no rebound tenderness,  Ext: No cyanosis, no edema  Neuro: Intubated   Skin: No rash   IV: No phlebitis      Labs:                        8.5    4.93  )-----------( 23       ( 03 Jan 2025 06:34 )             24.9     01-03    138  |  105  |  21  ----------------------------<  146[H]  4.2   |  18[L]  |  <0.30[L]    Ca    8.3[L]      03 Jan 2025 06:34  Phos  2.9     01-03  Mg     2.4     01-03    TPro  3.8[L]  /  Alb  3.4  /  TBili  25.8[H]  /  DBili  x   /  AST  78[H]  /  ALT  193[H]  /  AlkPhos  87  01-03      WBC Trend:  WBC Count: 4.93 (01-03-25 @ 06:34)  WBC Count: 4.63 (01-03-25 @ 00:32)  WBC Count: 3.94 (01-02-25 @ 18:09)  WBC Count: 4.06 (01-02-25 @ 13:01)      Creatine Trend:  Creatinine: <0.30 (01-03)  Creatinine: <0.30 (01-03)  Creatinine: <0.30 (01-02)  Creatinine: <0.30 (01-02)      Liver Biochemical Testing Trend:  Alanine Aminotransferase (ALT/SGPT): 193 *H* (01-03)  Alanine Aminotransferase (ALT/SGPT): 194 *H* (01-03)  Alanine Aminotransferase (ALT/SGPT): 201 *H* (01-02)  Alanine Aminotransferase (ALT/SGPT): 206 *H* (01-02)  Alanine Aminotransferase (ALT/SGPT): 192 *H* (01-02)  Aspartate Aminotransferase (AST/SGOT): 78 (01-03-25 @ 06:34)  Aspartate Aminotransferase (AST/SGOT): 82 (01-03-25 @ 00:32)  Aspartate Aminotransferase (AST/SGOT): 86 (01-02-25 @ 17:42)  Aspartate Aminotransferase (AST/SGOT): 89 (01-02-25 @ 13:01)  Aspartate Aminotransferase (AST/SGOT): 86 (01-02-25 @ 07:01)  Bilirubin Total: 25.8 (01-03)  Bilirubin Total: 25.2 (01-03)  Bilirubin Direct: >10.0 (01-03)  Bilirubin Total: 24.4 (01-02)  Bilirubin Direct: >10.0 (01-02)      Trend LDH  01-03-25 @ 00:32  258[H]  12-26-24 @ 12:33  766[H]  12-10-24 @ 00:29  231  12-09-24 @ 00:32  241  12-08-24 @ 18:30  288[H]      Urinalysis Basic - ( 03 Jan 2025 06:34 )    Color: x / Appearance: x / SG: x / pH: x  Gluc: 146 mg/dL / Ketone: x  / Bili: x / Urobili: x   Blood: x / Protein: x / Nitrite: x   Leuk Esterase: x / RBC: x / WBC x   Sq Epi: x / Non Sq Epi: x / Bacteria: x        MICROBIOLOGY:  Vancomycin Level, Random: <4.0 (12-28 @ 06:19)  Vancomycin Level, Random: 9.0 (12-27 @ 05:52)  Vancomycin Level, Random: 15.0 (12-26 @ 06:24)  Vancomycin Level, Random: 9.3 (12-25 @ 06:10)  Vancomycin Level, Random: 16.6 (12-24 @ 06:25)  Vancomycin Level, Random: 16.3 (12-23 @ 06:15)  Vancomycin Level, Random: 11.4 (12-22 @ 06:17)  Vancomycin Level, Random: 4.4 (12-21 @ 06:30)  Vancomycin Level, Random: 8.5 (12-20 @ 06:25)    MRSA PCR Result.: NotDetec (11-24-24 @ 10:14)      Culture - Blood (collected 02 Jan 2025 00:30)  Source: .Blood BLOOD  Preliminary Report:    No growth at 24 hours    Culture - Blood (collected 01 Jan 2025 01:23)  Source: .Blood BLOOD  Preliminary Report:    No growth at 24 hours    Culture - Blood (collected 28 Dec 2024 18:10)  Source: .Blood BLOOD  Final Report:    Growth in anaerobic bottle: Clostridium paraputrificum "Susceptibilities    not performed"    Direct identification is available within approximately 3-5    hours either by Blood Panel Multiplexed PCR or Direct    MALDI-TOF. Details: [email or web address]  Organism: Blood Culture PCR  Organism: Blood Culture PCR    Sensitivities:      Method Type: PCR      -  Blood PCR Panel: NEG    Culture - Blood (collected 28 Dec 2024 12:15)  Source: .Blood BLOOD  Final Report:    No growth at 5 days    Culture - Body Fluid with Gram Stain (collected 26 Dec 2024 19:36)  Source: Body Fluid  Final Report:    Rare Enterococcus faecalis  Organism: Enterococcus faecalis  Organism: Enterococcus faecalis    Sensitivities:      Method Type: ESAU      -  Ampicillin: S <=2 Predicts results to ampicillin/sulbactam, amoxacillin-clavulanate and  piperacillin-tazobactam.      -  Vancomycin: S 1      -  Gentamicin synergy: R >500      -  Streptomycin synergy: S <=1000    Culture - Body Fluid with Gram Stain (collected 26 Dec 2024 19:33)  Source: Body Fluid  Final Report:    Rare Enterococcus faecalis  Organism: Enterococcus faecalis  Organism: Enterococcus faecalis    Sensitivities:      Method Type: ESAU      -  Ampicillin: S <=2 Predicts results to ampicillin/sulbactam, amoxacillin-clavulanate and  piperacillin-tazobactam.      -  Vancomycin: S 1      -  Gentamicin synergy: R >500      -  Streptomycin synergy: S <=1000    Culture - Blood (collected 24 Dec 2024 03:30)  Source: .Blood BLOOD  Final Report:    No growth at 5 days    Culture - Bronchial (collected 23 Dec 2024 16:37)  Source: Combi-Cath  Final Report:    [employer] consistent with body site    Culture - Blood (collected 23 Dec 2024 16:03)  Source: .Blood BLOOD  Final Report:    Growth in anaerobic bottle: Clostridium paraputrificum "Susceptibilities    not performed"    Direct identification is available within approximately 3-5    hours either by Blood Panel Multiplexed PCR or Direct    MALDI-TOF. Details: [email or web address]  Organism: Blood Culture PCR  Organism: Blood Culture PCR    Sensitivities:      Method Type: PCR      -  Blood PCR Panel: NEG    Culture - Body Fluid with Gram Stain (collected 20 Dec 2024 18:23)  Source: Abdominal Fl  Final Report:    Rare Enterococcus faecalis  Organism: Enterococcus faecalis  Organism: Enterococcus faecalis    Sensitivities:      Method Type: ESAU      -  Ampicillin: S <=2 Predicts results to ampicillin/sulbactam, amoxacillin-clavulanate and  piperacillin-tazobactam.      -  Vancomycin: S 1      HIV-1/2 Combo Result: Nonreact (11-15-24 @ 00:41)      HIV-1 RNA Quantitative, Viral Load: NOT DET. copies/mL (12-18-24 @ 22:14)  HIV-1 Viral Load Result: NOT DET. (12-18-24 @ 22:14)    Cytomegalovirus By PCR: 537 IU/mL (12-31-24 @ 13:36)                COVID-19 PCR: NotDetec (11-15-24 @ 01:34)      Procalcitonin: 2.20 (01-03)  Procalcitonin: 1.78 (01-01)  Procalcitonin: 1.43 (12-30)    C-Reactive Protein: 43 (01-03)  C-Reactive Protein: 46 (01-01)  C-Reactive Protein: 42 (12-30)        Lactate Dehydrogenase, Serum: 258 (01-03)        Blood Gas Arterial, Lactate: 3.1 (01-03 @ 06:30)  Blood Gas Arterial, Lactate: 2.6 (01-03 @ 00:26)  Blood Gas Arterial, Lactate: 3.0 (01-02 @ 17:31)  Blood Gas Arterial, Lactate: 3.3 (01-02 @ 12:25)      RADIOLOGY:  imaging below personally reviewed   Follow Up:      Interval History/ROS:Patient is a 74y old  Male who presents with a chief complaint of Liver Transplant (03 Jan 2025 06:00)  Pt trach/vent with minimal mental status       Allergies  No Known Allergies        ANTIMICROBIALS:    caspofungin IVPB 50 every 24 hours  caspofungin IVPB    ganciclovir IVPB 250 every 24 hours  meropenem  IVPB 1000 every 8 hours  metroNIDAZOLE  IVPB 500 every 8 hours  trimethoprim  40 mG/sulfamethoxazole 200 mG Suspension 80 daily      OTHER MEDS: MEDICATIONS  (STANDING):  albuterol/ipratropium for Nebulization 3 every 6 hours PRN  buDESOnide    Inhalation Suspension 0.5 every 12 hours  cycloSPORINE  , modified (GENGRAF) Solution 75 <User Schedule>  cycloSPORINE  , modified (GENGRAF) Solution 50 <User Schedule>  diphenoxylate/atropine 2 every 6 hours  insulin lispro (ADMELOG) corrective regimen sliding scale  every 6 hours  loperamide Liquid 2 every 6 hours  methylPREDNISolone sodium succinate Injectable 32 <User Schedule>  midodrine 10 every 8 hours  oxyCODONE    Solution 5 every 4 hours PRN  oxyCODONE    Solution 2.5 every 4 hours PRN  pantoprazole  Injectable 40 every 12 hours  phenylephrine    Infusion 0.1 <Continuous>  ursodiol Suspension 300 every 12 hours  vasopressin Infusion 0.03 <Continuous>      Vital Signs Last 24 Hrs  T(F): 98.6 (01-03-25 @ 08:00), Max: 100.8 (12-29-24 @ 12:00)    Vital Signs Last 24 Hrs  HR: 107 (01-03-25 @ 10:00) (96 - 109)  BP: --  RR: 35 (01-03-25 @ 10:00)  SpO2: 100% (01-03-25 @ 10:00) (96% - 100%)  Wt(kg): --    EXAM:  General: Patient in no acute distress, intubated   CV: S1+S2, no m/r/g appreciated   Lungs: No respiratory distress, CTAB  Abd: Soft, no guarding, no rebound tenderness,  Ext: No cyanosis, no edema  Neuro: Intubated   Skin: No rash   IV: No phlebitis      Labs:                        8.5    4.93  )-----------( 23       ( 03 Jan 2025 06:34 )             24.9     01-03    138  |  105  |  21  ----------------------------<  146[H]  4.2   |  18[L]  |  <0.30[L]    Ca    8.3[L]      03 Jan 2025 06:34  Phos  2.9     01-03  Mg     2.4     01-03    TPro  3.8[L]  /  Alb  3.4  /  TBili  25.8[H]  /  DBili  x   /  AST  78[H]  /  ALT  193[H]  /  AlkPhos  87  01-03      WBC Trend:  WBC Count: 4.93 (01-03-25 @ 06:34)  WBC Count: 4.63 (01-03-25 @ 00:32)  WBC Count: 3.94 (01-02-25 @ 18:09)  WBC Count: 4.06 (01-02-25 @ 13:01)      Creatine Trend:  Creatinine: <0.30 (01-03)  Creatinine: <0.30 (01-03)  Creatinine: <0.30 (01-02)  Creatinine: <0.30 (01-02)      Liver Biochemical Testing Trend:  Alanine Aminotransferase (ALT/SGPT): 193 *H* (01-03)  Alanine Aminotransferase (ALT/SGPT): 194 *H* (01-03)  Alanine Aminotransferase (ALT/SGPT): 201 *H* (01-02)  Alanine Aminotransferase (ALT/SGPT): 206 *H* (01-02)  Alanine Aminotransferase (ALT/SGPT): 192 *H* (01-02)  Aspartate Aminotransferase (AST/SGOT): 78 (01-03-25 @ 06:34)  Aspartate Aminotransferase (AST/SGOT): 82 (01-03-25 @ 00:32)  Aspartate Aminotransferase (AST/SGOT): 86 (01-02-25 @ 17:42)  Aspartate Aminotransferase (AST/SGOT): 89 (01-02-25 @ 13:01)  Aspartate Aminotransferase (AST/SGOT): 86 (01-02-25 @ 07:01)  Bilirubin Total: 25.8 (01-03)  Bilirubin Total: 25.2 (01-03)  Bilirubin Direct: >10.0 (01-03)  Bilirubin Total: 24.4 (01-02)  Bilirubin Direct: >10.0 (01-02)      Trend LDH  01-03-25 @ 00:32  258[H]  12-26-24 @ 12:33  766[H]  12-10-24 @ 00:29  231  12-09-24 @ 00:32  241  12-08-24 @ 18:30  288[H]      Urinalysis Basic - ( 03 Jan 2025 06:34 )    Color: x / Appearance: x / SG: x / pH: x  Gluc: 146 mg/dL / Ketone: x  / Bili: x / Urobili: x   Blood: x / Protein: x / Nitrite: x   Leuk Esterase: x / RBC: x / WBC x   Sq Epi: x / Non Sq Epi: x / Bacteria: x        MICROBIOLOGY:  Vancomycin Level, Random: <4.0 (12-28 @ 06:19)  Vancomycin Level, Random: 9.0 (12-27 @ 05:52)  Vancomycin Level, Random: 15.0 (12-26 @ 06:24)  Vancomycin Level, Random: 9.3 (12-25 @ 06:10)  Vancomycin Level, Random: 16.6 (12-24 @ 06:25)  Vancomycin Level, Random: 16.3 (12-23 @ 06:15)  Vancomycin Level, Random: 11.4 (12-22 @ 06:17)  Vancomycin Level, Random: 4.4 (12-21 @ 06:30)  Vancomycin Level, Random: 8.5 (12-20 @ 06:25)    MRSA PCR Result.: NotDetec (11-24-24 @ 10:14)      Culture - Blood (collected 02 Jan 2025 00:30)  Source: .Blood BLOOD  Preliminary Report:    No growth at 24 hours    Culture - Blood (collected 01 Jan 2025 01:23)  Source: .Blood BLOOD  Preliminary Report:    No growth at 24 hours    Culture - Blood (collected 28 Dec 2024 18:10)  Source: .Blood BLOOD  Final Report:    Growth in anaerobic bottle: Clostridium paraputrificum "Susceptibilities    not performed"    Direct identification is available within approximately 3-5    hours either by Blood Panel Multiplexed PCR or Direct    MALDI-TOF. Details: [email or web address]  Organism: Blood Culture PCR  Organism: Blood Culture PCR    Sensitivities:      Method Type: PCR      -  Blood PCR Panel: NEG    Culture - Blood (collected 28 Dec 2024 12:15)  Source: .Blood BLOOD  Final Report:    No growth at 5 days    Culture - Body Fluid with Gram Stain (collected 26 Dec 2024 19:36)  Source: Body Fluid  Final Report:    Rare Enterococcus faecalis  Organism: Enterococcus faecalis  Organism: Enterococcus faecalis    Sensitivities:      Method Type: ESAU      -  Ampicillin: S <=2 Predicts results to ampicillin/sulbactam, amoxacillin-clavulanate and  piperacillin-tazobactam.      -  Vancomycin: S 1      -  Gentamicin synergy: R >500      -  Streptomycin synergy: S <=1000    Culture - Body Fluid with Gram Stain (collected 26 Dec 2024 19:33)  Source: Body Fluid  Final Report:    Rare Enterococcus faecalis  Organism: Enterococcus faecalis  Organism: Enterococcus faecalis    Sensitivities:      Method Type: ESAU      -  Ampicillin: S <=2 Predicts results to ampicillin/sulbactam, amoxacillin-clavulanate and  piperacillin-tazobactam.      -  Vancomycin: S 1      -  Gentamicin synergy: R >500      -  Streptomycin synergy: S <=1000    Culture - Blood (collected 24 Dec 2024 03:30)  Source: .Blood BLOOD  Final Report:    No growth at 5 days    Culture - Bronchial (collected 23 Dec 2024 16:37)  Source: Combi-Cath  Final Report:    [employer] consistent with body site    Culture - Blood (collected 23 Dec 2024 16:03)  Source: .Blood BLOOD  Final Report:    Growth in anaerobic bottle: Clostridium paraputrificum "Susceptibilities    not performed"    Direct identification is available within approximately 3-5    hours either by Blood Panel Multiplexed PCR or Direct    MALDI-TOF. Details: [email or web address]  Organism: Blood Culture PCR  Organism: Blood Culture PCR    Sensitivities:      Method Type: PCR      -  Blood PCR Panel: NEG    Culture - Body Fluid with Gram Stain (collected 20 Dec 2024 18:23)  Source: Abdominal Fl  Final Report:    Rare Enterococcus faecalis  Organism: Enterococcus faecalis  Organism: Enterococcus faecalis    Sensitivities:      Method Type: ESAU      -  Ampicillin: S <=2 Predicts results to ampicillin/sulbactam, amoxacillin-clavulanate and  piperacillin-tazobactam.      -  Vancomycin: S 1      HIV-1/2 Combo Result: Nonreact (11-15-24 @ 00:41)      HIV-1 RNA Quantitative, Viral Load: NOT DET. copies/mL (12-18-24 @ 22:14)  HIV-1 Viral Load Result: NOT DET. (12-18-24 @ 22:14)    Cytomegalovirus By PCR: 537 IU/mL (12-31-24 @ 13:36)                COVID-19 PCR: NotDetec (11-15-24 @ 01:34)      Procalcitonin: 2.20 (01-03)  Procalcitonin: 1.78 (01-01)  Procalcitonin: 1.43 (12-30)    C-Reactive Protein: 43 (01-03)  C-Reactive Protein: 46 (01-01)  C-Reactive Protein: 42 (12-30)        Lactate Dehydrogenase, Serum: 258 (01-03)        Blood Gas Arterial, Lactate: 3.1 (01-03 @ 06:30)  Blood Gas Arterial, Lactate: 2.6 (01-03 @ 00:26)  Blood Gas Arterial, Lactate: 3.0 (01-02 @ 17:31)  Blood Gas Arterial, Lactate: 3.3 (01-02 @ 12:25)      RADIOLOGY:  imaging below personally reviewed

## 2025-01-04 NOTE — PROGRESS NOTE ADULT - ASSESSMENT
74 year-old with known coronary artery disease as above presents for liver transplant. Transplant performed 11/15.    PAF - currently sinus rhythm     Patient was septic on Friday, 12/6, and at that time, he was seen to be hyperdynamic with TODD and severe LVOT gradient.  Post ex-lap, patient seen to have newly reduced LVEF.  IABP placed. Inotrope and pressor support. IABP removed 12/10 without significant drop in cardiac output.  Weaned off both inotrope and pressors. Now restarted on beta-blocker.  Keep MAP >65 mmHg.    On 12/19, he had dynamic ECG changes concerning for acute occlusion of inferior-posterior territory. It was transient, resolved spontaneously without intervention, and serial troponin did not rise afterward.  Suspect transient occlusion of coronary. Now reperfused. Subsequent TTE without new wall motion abnormalities.   ASA and anticoagulation held for ongoing GI bleeding.  Beta-blockers on hold for shock state.    Plan for PCI prior to discharge, but patient is not optimized for elective PCI at this time.    Discussed with Transplant Team and family.

## 2025-01-04 NOTE — PROGRESS NOTE ADULT - ASSESSMENT
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 Post op course complicated by acute hypoxic respiratory failure requiring intubation multiple times, most recently on 12/7 with conversion to tracheostomy on 12/17, e.coli bacteremia with RTOR on 12/7 for concern for worsening septic shock and cardiogenic shock s/p balloon pump placement and total abdominal colectomy in setting of ischemic bowel s/p OR on 12/9 for ileostomy creation, and olirugirc MORAIMA requiring CRRT and now intermittent HD.    Diagnosed with pneumonia secondary to Stenotrophomonas    Also with growth of Enterococcus faecalis from abdominal drains and urine culture    More fever and shock event on 12/29/24 likely 2/2 GIB    UA (12/19) 2 WBC  BCx (12/23) Clostridium paraputrificum  Bronch Cx (12/23) NGTD    CT Chest (12/23) Bibasilar groundglass and tree in bud opacities which may represent distal airway impaction versus pneumonia.    CT A/P (12/23) Peripheral wedge-shaped areas of hypoattenuation involving the superior aspect of the spleen, suggestive of splenic infarcts (12-59). Mildly dilated loops of proximal small bowel without transition point, likely ileus.    Testicular US (12/23) No appreciable arterial flow with very limited venous flow in in the testes bilaterally. Interval decrease in diffuse scrotal edema. Small bilateral hydroceles.    CT Pelvis (12/25) Moderate diffuse subcutaneous/scrotal edema without defined collection or subcutaneous air.    BCX (12/28): Gram variable rods (Blood PCR neg)    Antibiotic Course:  Meropenem ( 11/22-11/29, 11/22-11/29, 12/16-)   Minocycline (12/21-1/1)  Bactrim (11/16-11/24, 12/8-12/11, 12/21-)  Fluconazole (11/16-12/2, 12/12-12/16)   Caspofungin ( 12/6-12/11, 12/17-)  Cefepime (12/10-12/16)   Metronidazole (12/10-12/14)   Ganciclovir (12/7-12/13)   Linezolid (11/23-11/26, 12/6-12/11, 12/28-12/29)   Atovaquone (11/29-12/6)   Zosyn (11/20-11/22,12/6)   Tobramycin (12/6)   Valganciclovir (11/6-12/4)    #Positive Blood Culture (12/23 Clostridium paraputrificum) (12/28: Gram variable rods -Blood PCR neg)  Clostridium paraputrificum is generally penicillin and metronidazole susceptible      #Leukocytosis, Fever, Shock, Transaminitis, Stenotrophomonas Pneumonia  --If further worsening shock overnight would repeat blood cultures and administer Tobramycin 600 mg IV x1 (7 mg/kg based on adjusted body weight).   --CT C/A/P showing Small bilateral pleural effusions with associated compressive atelectasis, increased since prior exam. Mild distention and mural thickening of small bowel of concern for enteritis. Status post liver transplant and colectomy with stable postsurgical changes.  --s/p 10 day course of Minocycline 200 mg IV Q12H (12/21 >1/1)   --Continue flagyl 400mg Q8, and  Imipenem 500mg Q6  --remains on Caspofungin; no growth of fungal organisms  -Decrease immunosuppression if possible     #Liver Transplant Recipient, Prophylactic Antibiotic  --Repeat CMV detectable on 12/31  --Given thrombocytopenia can consider holding Bactrim or now  Cytomegalovirus By PCR: Det <34.5 IU/mL (12.23.24 @ 06:15)  Cytomegalovirus By PCR: 537 IU/mL (12.31.24 @ 13:36)  --restarted on induction therapy with ganciclovir IV adjusted for CVVH dosing        Thank you for involving us in the care of this patient  Transplant ID will continue to follow  Please call or page with additional questions  Pager; #[phone]  Teams: from 8 am to 5 pm  Rachele Lewis MD     74 year old male with PMH of DM, HTN, pAfib s/p ablation 2018 (no AC 2/2 thrombocytopenia), CAD, depression, anxiety, BPH, 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 Post op course complicated by acute hypoxic respiratory failure requiring intubation multiple times, most recently on 12/7 with conversion to tracheostomy on 12/17, e.coli bacteremia with RTOR on 12/7 for concern for worsening septic shock and cardiogenic shock s/p balloon pump placement and total abdominal colectomy in setting of ischemic bowel s/p OR on 12/9 for ileostomy creation, and olirugirc MORAIMA requiring CRRT and now intermittent HD.    Diagnosed with pneumonia secondary to Stenotrophomonas    Also with growth of Enterococcus faecalis from abdominal drains and urine culture    More fever and shock event on 12/29/24 likely 2/2 GIB    UA (12/19) 2 WBC  BCx (12/23) Clostridium paraputrificum  Bronch Cx (12/23) NGTD    CT Chest (12/23) Bibasilar groundglass and tree in bud opacities which may represent distal airway impaction versus pneumonia.    CT A/P (12/23) Peripheral wedge-shaped areas of hypoattenuation involving the superior aspect of the spleen, suggestive of splenic infarcts (12-59). Mildly dilated loops of proximal small bowel without transition point, likely ileus.    Testicular US (12/23) No appreciable arterial flow with very limited venous flow in in the testes bilaterally. Interval decrease in diffuse scrotal edema. Small bilateral hydroceles.    CT Pelvis (12/25) Moderate diffuse subcutaneous/scrotal edema without defined collection or subcutaneous air.    BCX (12/28): Gram variable rods (Blood PCR neg)    Antibiotic Course:  Meropenem ( 11/22-11/29, 11/22-11/29, 12/16-)   Minocycline (12/21-1/1)  Bactrim (11/16-11/24, 12/8-12/11, 12/21-)  Fluconazole (11/16-12/2, 12/12-12/16)   Caspofungin ( 12/6-12/11, 12/17-)  Cefepime (12/10-12/16)   Metronidazole (12/10-12/14)   Ganciclovir (12/7-12/13)   Linezolid (11/23-11/26, 12/6-12/11, 12/28-12/29)   Atovaquone (11/29-12/6)   Zosyn (11/20-11/22,12/6)   Tobramycin (12/6)   Valganciclovir (11/6-12/4)    #Positive Blood Culture (12/23 Clostridium paraputrificum) (12/28: Gram variable rods -Blood PCR neg)  Clostridium paraputrificum is generally penicillin and metronidazole susceptible      #Leukocytosis, Fever, Shock, Transaminitis,   #Stenotrophomonas Pneumonia  # polymicrobial bacteremia E faecalis; Clostridium spp in c/o Gi pathology but also necrotic scrotum     --If further worsening shock overnight would repeat blood cultures and administer Tobramycin 600 mg IV x1 (7 mg/kg based on adjusted body weight).   --CT C/A/P showing Small bilateral pleural effusions with associated compressive atelectasis, increased since prior exam. Mild distention and mural thickening of small bowel of concern for enteritis. Status post liver transplant and colectomy with stable postsurgical changes.  --s/p 10 day course of Minocycline 200 mg IV Q12H (12/21 >1/1) for Stenorophomnas   --Continue flagyl 500mg Q8, and  Imipenem 500mg Q6  --remains on Caspofungin; no growth of fungal organisms  -Decrease immunosuppression if possible     #Liver Transplant Recipient, Prophylactic Antibiotic  --Repeat CMV detectable on 12/31  --Given thrombocytopenia can consider holding Bactrim or now  Cytomegalovirus By PCR: Det <34.5 IU/mL (12.23.24 @ 06:15)  Cytomegalovirus By PCR: 537 IU/mL (12.31.24 @ 13:36)  --restarted on induction therapy with ganciclovir IV adjusted for CVVH dosing  check CMV PCR monday      Thank you for involving us in the care of this patient  Transplant ID will continue to follow  Please call or page with additional questions  Pager; #[phone]  Teams: from 8 am to 5 pm  Rachele Lewis MD

## 2025-01-04 NOTE — CHART NOTE - NSCHARTNOTEFT_GEN_A_CORE
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 Post-op course c/b worsening mental status and acute hypoxic respiratory failure requiring multiple intubations/extubations, most recently on 12/7 with conversion to tracheostomy on 12/17, e.coli bacteremia with RTOR on 12/7 for concern for worsening septic shock and cardiogenic shock s/p balloon pump placement and total abdominal colectomy in setting of ischemic bowel s/p OR on 12/9 for ileostomy creation, and oligouric MORAIMA requiring CRRT and now intermittent HD. As of 01/03/2025, the total bilirubin is uptrending (Tbili 25.8), rising INR (3.35), transaminitis, and thrombocytopenia. Transfusion Medicine was consulted by transplant team to initiate therapeutic plasma exchange (TPE) due to poor graft function (Course of 3 TPE over 3 days).     - PLEX#1: Performed on 01/03/25 with one plasma volume exchanged using donor plasma as replacement fluid. Patient tolerated the procedure well.    - PLEX#2: Performed on 01/04/25 with one plasma volume exchanged using donor plasma as replacement fluid. Patient tolerated the procedure well.    - PLEX#3: Scheduled for 01/05/25.

## 2025-01-04 NOTE — PROGRESS NOTE ADULT - SUBJECTIVE AND OBJECTIVE BOX
SURGERY DAILY PROGRESS NOTE      INTERVAL EVENTS PER SICU NOTE:  - BCx positive for clostridium paraputrificum   - imipenem started  - added immodium  - vitK 5 PO for 3 days   - 1 rbc, 1 platelet, 1 cryo    SUBJECTIVE: Patient seen and evaluated on AM rounds.     -----------------------------------------------------------------------------------------------------------------------------------------------------------------------------------------------------------  OBJECTIVE:  Vital Signs Last 24 Hrs  T(C): 36.7 (04 Jan 2025 03:00), Max: 37 (03 Jan 2025 08:00)  T(F): 98.1 (04 Jan 2025 03:00), Max: 98.6 (03 Jan 2025 08:00)  HR: 106 (04 Jan 2025 07:00) (97 - 118)  BP: 154/69 (04 Jan 2025 07:00) (114/59 - 154/69)  BP(mean): 99 (04 Jan 2025 07:00) (83 - 101)  RR: 22 (04 Jan 2025 07:00) (20 - 40)  SpO2: 96% (04 Jan 2025 07:00) (95% - 100%)    Parameters below as of 04 Jan 2025 05:10  Patient On (Oxygen Delivery Method): ventilator      I&O's Detail    03 Jan 2025 07:01  -  04 Jan 2025 07:00  --------------------------------------------------------  IN:    Albumin 5%  - 500 mL: 1250 mL    Cryoprecipitate: 225 mL    Enteral Tube Flush: 730 mL    IV PiggyBack: 100 mL    IV PiggyBack: 750 mL    IV PiggyBack: 200 mL    Nepro with Carb Steady: 275 mL    Phenylephrine: 149.5 mL    Platelets - Single Donor: 975 mL    PRBCs (Packed Red Blood Cells): 900 mL    Vital1.5: 845 mL  Total IN: 6399.5 mL    OUT:    Bulb (mL): 725 mL    Bulb (mL): 20 mL    Ileostomy (mL): 3375 mL    Indwelling Catheter - Urethral (mL): 10 mL    Other (mL): 145 mL    VAC (Vacuum Assisted Closure) System (mL): 0 mL    Vasopressin: 0 mL  Total OUT: 4275 mL    Total NET: 2124.5 mL        Daily     Daily   MEDICATIONS  (STANDING):  buDESOnide    Inhalation Suspension 0.5 milliGRAM(s) Inhalation every 12 hours  caspofungin IVPB      caspofungin IVPB 50 milliGRAM(s) IV Intermittent every 24 hours  chlorhexidine 0.12% Liquid 15 milliLiter(s) Oral Mucosa every 12 hours  chlorhexidine 2% Cloths 1 Application(s) Topical <User Schedule>  CRRT Treatment    <Continuous>  cycloSPORINE  , modified (GENGRAF) Solution 75 milliGRAM(s) Oral <User Schedule>  cycloSPORINE  , modified (GENGRAF) Solution 50 milliGRAM(s) Oral <User Schedule>  diphenoxylate/atropine 2 Tablet(s) Oral every 6 hours  ganciclovir IVPB 250 milliGRAM(s) IV Intermittent every 24 hours  imipenem/cilastatin  IVPB 500 milliGRAM(s) IV Intermittent every 6 hours  insulin lispro (ADMELOG) corrective regimen sliding scale   SubCutaneous every 6 hours  loperamide Liquid 2 milliGRAM(s) Enteral Tube every 6 hours  methylPREDNISolone sodium succinate Injectable 32 milliGRAM(s) IV Push <User Schedule>  metroNIDAZOLE  IVPB 500 milliGRAM(s) IV Intermittent every 8 hours  midodrine 10 milliGRAM(s) Oral every 8 hours  mupirocin 2% Ointment 1 Application(s) Topical every 12 hours  nystatin Powder 1 Application(s) Topical every 12 hours  pantoprazole  Injectable 40 milliGRAM(s) IV Push every 12 hours  Phoxillum Filtration BK 4 / 2.5 5000 milliLiter(s) (1250 mL/Hr) CRRT <Continuous>  Phoxillum Filtration BK 4 / 2.5 5000 milliLiter(s) (250 mL/Hr) CRRT <Continuous>  phytonadione   Solution 5 milliGRAM(s) Enteral Tube every 24 hours  PrismaSATE Dialysate BK 0 / 3.5 5000 milliLiter(s) (1500 mL/Hr) CRRT <Continuous>  trimethoprim  40 mG/sulfamethoxazole 200 mG Suspension 80 milliGRAM(s) Enteral Tube daily  ursodiol Suspension 300 milliGRAM(s) Oral every 12 hours    MEDICATIONS  (PRN):  albuterol/ipratropium for Nebulization 3 milliLiter(s) Nebulizer every 6 hours PRN Shortness of Breath and/or Wheezing  FIRST- Mouthwash  BLM 10 milliLiter(s) Swish and Spit every 8 hours PRN Mouth Care  oxyCODONE    Solution 5 milliGRAM(s) Oral every 4 hours PRN Severe Pain (7 - 10)  oxyCODONE    Solution 2.5 milliGRAM(s) Oral every 4 hours PRN Moderate Pain (4 - 6)      LABS:                        8.8    2.78  )-----------( 30       ( 04 Jan 2025 02:08 )             25.7     01-04    142  |  106  |  35[H]  ----------------------------<  250[H]  3.2[L]   |  18[L]  |  0.37[L]    Ca    9.0      04 Jan 2025 00:15  Phos  3.5     01-04  Mg     2.2     01-04    TPro  4.2[L]  /  Alb  3.0[L]  /  TBili  16.2[H]  /  DBili  x   /  AST  52[H]  /  ALT  106[H]  /  AlkPhos  65  01-04    PT/INR - ( 04 Jan 2025 00:15 )   PT: 20.4 sec;   INR: 1.78 ratio         PTT - ( 04 Jan 2025 00:15 )  PTT:37.3 sec  Urinalysis Basic - ( 04 Jan 2025 00:15 )    Color: x / Appearance: x / SG: x / pH: x  Gluc: 250 mg/dL / Ketone: x  / Bili: x / Urobili: x   Blood: x / Protein: x / Nitrite: x   Leuk Esterase: x / RBC: x / WBC x   Sq Epi: x / Non Sq Epi: x / Bacteria: x            PHYSICAL EXAM:  General: Not on sedation, not following commands, frail appearing  Pulmonary: On trach collar  Cardiovascular: borderline hypotension on pressor support  Abdominal: soft, ostomy pink and viable with blood-tinged liquid output

## 2025-01-04 NOTE — PROGRESS NOTE ADULT - SUBJECTIVE AND OBJECTIVE BOX
[facility] DIVISION OF KIDNEY DISEASES AND HYPERTENSION -- FOLLOW UP NOTE  --------------------------------------------------------------------------------  Authored by: Terra Christie   Cell # [phone]     STERLING BRYANT was seen and examined at bedside.   Patient is a 74y old  Male who presents with a chief complaint of Liver Transplant (01-04-25)      24 hour events/subjective: Blood culture positive for clostridium paraputrificum, Started imipenem, lines changes.   Plex done yesterday. CRRT stopped for plex and remained off overnight.   Off vasopressors, BP improved 140's this AM. Received albumin, PRBC, FFP and Platelet transfusions. Anuric. On vent via trach.         Standing Inpatient Medications  buDESOnide    Inhalation Suspension 0.5 milliGRAM(s) Inhalation every 12 hours  caspofungin IVPB 50 milliGRAM(s) IV Intermittent every 24 hours  cycloSPORINE  , modified (GENGRAF) Solution 75 milliGRAM(s) Oral <User Schedule>  cycloSPORINE  , modified (GENGRAF) Solution 50 milliGRAM(s) Oral <User Schedule>  diphenoxylate/atropine 2 Tablet(s) Oral every 6 hours  ganciclovir IVPB 250 milliGRAM(s) IV Intermittent every 24 hours  imipenem/cilastatin  IVPB 500 milliGRAM(s) IV Intermittent every 6 hours  insulin lispro (ADMELOG) corrective regimen sliding scale   SubCutaneous every 6 hours  loperamide Liquid 2 milliGRAM(s) Enteral Tube every 6 hours  methylPREDNISolone sodium succinate Injectable 32 milliGRAM(s) IV Push <User Schedule>  metroNIDAZOLE  IVPB 500 milliGRAM(s) IV Intermittent every 8 hours  midodrine 10 milliGRAM(s) Oral every 8 hours  mupirocin 2% Ointment 1 Application(s) Topical every 12 hours  nystatin Powder 1 Application(s) Topical every 12 hours  pantoprazole  Injectable 40 milliGRAM(s) IV Push every 12 hours  phytonadione   Solution 5 milliGRAM(s) Enteral Tube every 24 hours  trimethoprim  40 mG/sulfamethoxazole 200 mG Suspension 80 milliGRAM(s) Enteral Tube daily  ursodiol Suspension 300 milliGRAM(s) Oral every 12 hours    PRN Inpatient Medications  albuterol/ipratropium for Nebulization 3 milliLiter(s) Nebulizer every 6 hours PRN  FIRST- Mouthwash  BLM 10 milliLiter(s) Swish and Spit every 8 hours PRN  oxyCODONE    Solution 5 milliGRAM(s) Oral every 4 hours PRN  oxyCODONE    Solution 2.5 milliGRAM(s) Oral every 4 hours PRN        VITALS/PHYSICAL EXAM  --------------------------------------------------------------------------------  T(C): 36.6 (01-04-25 @ 07:30), Max: 36.8 (01-03-25 @ 12:00)  HR: 109 (01-04-25 @ 08:00) (97 - 118)  BP: 154/68 (01-04-25 @ 08:00) (114/59 - 154/69)  RR: 24 (01-04-25 @ 08:00) (20 - 40)  SpO2: 95% (01-04-25 @ 08:00) (95% - 100%)      01-03-25 @ 07:01  -  01-04-25 @ 07:00  --------------------------------------------------------  IN: 7389.5 mL / OUT: 4275 mL / NET: 3114.5 mL    01-04-25 @ 07:01  -  01-04-25 @ 08:39  --------------------------------------------------------  IN: 65 mL / OUT: 0 mL / NET: 65 mL      Physical Exam:  More awake, opens eyes, tracks a bit   On vent via trach   Left IJ temporary dialysis catheter   NGT +  Icterus +  Abdomen - vac dressing, ileostomy  Castro in place minimal UOP   minimal LE edema  superficial skin ulcerations b/l things and necrotic appearing scrotal skin       LABS/STUDIES  --------------------------------------------------------------------------------              8.8    2.81  >-----------<  32       [01-04-25 @ 07:06]              26.5     140  |  106  |  40  ----------------------------<  273      [01-04-25 @ 06:56]  3.7   |  18  |  0.43        Ca     8.9     [01-04-25 @ 06:56]      Mg     2.2     [01-04-25 @ 06:56]      Phos  3.2     [01-04-25 @ 06:56]    TPro  4.4  /  Alb  3.2  /  TBili  17.2  /  DBili  x   /  AST  52  /  ALT  112  /  AlkPhos  72  [01-04-25 @ 06:56]    PT/INR: PT 21.6 , INR 1.91       [01-04-25 @ 07:06]  PTT: 37.7       [01-04-25 @ 07:06]          [01-03-25 @ 00:32]    Creatinine Trend:  SCr 0.43 [01-04 @ 06:56]  SCr 0.37 [01-04 @ 00:15]  SCr 0.33 [01-03 @ 18:08]  SCr <0.30 [01-03 @ 12:11]  SCr <0.30 [01-03 @ 06:34]

## 2025-01-04 NOTE — PROGRESS NOTE ADULT - ASSESSMENT
Ramón Davison is a 74 y.o. man with history of Afib (s/p ablation), CAD, GONZALES cirrhosis/HCC w/ portal HTN s/p OLT on 11/15 c/b cardiogenic shock s/p IABP (12/7-10), septic shock, respiratory failure s/p trach, renal failure (on CCRT), and mesenteric ischemia s/p total abdominal colectomy and end ileostomy (12/9) who remains critically ill in the SICU.     Colorectal consultation called back for recommendations regarding high ileostomy output. Agree with continued lomotil and the additional of Imodium today. If output is not adequately controlled, would suggest the addition of tincture of opium. Following ostomy creation, patient initially had an ileus. Sudden increase in output maybe secondary to another cause. May be in response to GIB given color and quality of ostomy output. Ostomy itself is pink and viable appearing. Blood in output is dark, suggesting possible UGI source. Given EGD finding from 12/26, likely due to ongoing oozing from stomach, especially in the setting of elevated INR.      RECOMMENDATIONS:  - Continue lomotil 2 tabs q6  - Imodium 2mg q6 added today, monitor for changes in ileostomy output  - If ostomy output does not improve with maximum lomotil and imodium, consider tincture of opium  - No colorectal surgery intervention indicated at this time  - Appreciate remainder of care per SICU/Transplant    [facility] [phone]

## 2025-01-04 NOTE — PROGRESS NOTE ADULT - SUBJECTIVE AND OBJECTIVE BOX
Transplant Surgery - Multidisciplinary Rounds  --------------------------------------------------------------  OLT   11/15/2024         Colectomy 12/7/2024     IABP 12/7 removed 12/10  Tracheostomy 12/17/2024    Present:   Patient seen and examined with multidisciplinary Transplant team including Surgeon: Dr. Hutchinson, Hepatologist Dr. Luis, JAZZ Fisher/Ale/JOHN Ferraro, Hepatologist: Dr. Ponce/Jyothi, Pharmacist: Jose Juan and bedside RN in AM rounds.   Disciplines not in attendance will be notified of the plan.     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     s/p OLT 11/15 with post op course c/b:  Hypoxia: Reintubated 11/20, extubated 11/24->reintubated 11/24, extubated 11/26, reintubated 12/7, trached 12/17  Ileus   A fib  AMS/Seizure   L brachial DVT  Neutropenia  E coli Bacteremia (12/6)  s/p Colectomy 12/7.   IABP 12/7, removed 12/10  Ec Faecalis UTI (12/16)  Stenotrophamonas in trach aspirate (12/19)  Clostridium in blood 12/23  UGIB 12/26    Interval/Overnight Events:   -Afebrile, off pressor drips on Midodrine 10  -on CRRT net even, anuric and PLEX  -coagulopathy corrected by SICU (3uCryo, 2uPRBC, 3uPlatelets)  -high ostomy output >1L adding immodium    Immunosuppression:  - Cyclo per level, MMF HELD, Solu 32  -ongoing monitoring for signs of rejection      MEDICATIONS  (STANDING):  buDESOnide    Inhalation Suspension 0.5 milliGRAM(s) Inhalation every 12 hours  caspofungin IVPB      caspofungin IVPB 50 milliGRAM(s) IV Intermittent every 24 hours  chlorhexidine 0.12% Liquid 15 milliLiter(s) Oral Mucosa every 12 hours  chlorhexidine 2% Cloths 1 Application(s) Topical <User Schedule>  CRRT Treatment    <Continuous>  cycloSPORINE  , modified (GENGRAF) Solution 75 milliGRAM(s) Oral <User Schedule>  cycloSPORINE  , modified (GENGRAF) Solution 50 milliGRAM(s) Oral <User Schedule>  diphenoxylate/atropine 2 Tablet(s) Oral every 6 hours  ganciclovir IVPB 250 milliGRAM(s) IV Intermittent every 24 hours  imipenem/cilastatin  IVPB 500 milliGRAM(s) IV Intermittent every 6 hours  insulin lispro (ADMELOG) corrective regimen sliding scale   SubCutaneous every 6 hours  insulin NPH human recombinant 6 Unit(s) SubCutaneous every 6 hours  loperamide Liquid 2 milliGRAM(s) Enteral Tube every 6 hours  methylPREDNISolone sodium succinate Injectable 32 milliGRAM(s) IV Push <User Schedule>  metroNIDAZOLE  IVPB 500 milliGRAM(s) IV Intermittent every 8 hours  midodrine 10 milliGRAM(s) Oral every 8 hours  mupirocin 2% Ointment 1 Application(s) Topical every 12 hours  nystatin Powder 1 Application(s) Topical every 12 hours  pantoprazole  Injectable 40 milliGRAM(s) IV Push every 12 hours  Phoxillum Filtration BK 4 / 2.5 5000 milliLiter(s) (1250 mL/Hr) CRRT <Continuous>  Phoxillum Filtration BK 4 / 2.5 5000 milliLiter(s) (250 mL/Hr) CRRT <Continuous>  phytonadione   Solution 5 milliGRAM(s) Enteral Tube every 24 hours  PrismaSATE Dialysate BK 0 / 3.5 5000 milliLiter(s) (1500 mL/Hr) CRRT <Continuous>  trimethoprim  40 mG/sulfamethoxazole 200 mG Suspension 80 milliGRAM(s) Enteral Tube daily  ursodiol Suspension 300 milliGRAM(s) Oral every 12 hours    MEDICATIONS  (PRN):  albuterol/ipratropium for Nebulization 3 milliLiter(s) Nebulizer every 6 hours PRN Shortness of Breath and/or Wheezing  FIRST- Mouthwash  BLM 10 milliLiter(s) Swish and Spit every 8 hours PRN Mouth Care  oxyCODONE    Solution 5 milliGRAM(s) Oral every 4 hours PRN Severe Pain (7 - 10)  oxyCODONE    Solution 2.5 milliGRAM(s) Oral every 4 hours PRN Moderate Pain (4 - 6)      PAST MEDICAL & SURGICAL HISTORY:  Diabetes      Transaminitis      Paroxysmal atrial fibrillation      Depression      BPH (benign prostatic hyperplasia)      Hypertension      Chronic atrial fibrillation      Coronary artery disease      Hepatocellular carcinoma      DM (diabetes mellitus)      HTN (hypertension)      Paroxysmal atrial fibrillation      Cirrhosis      HCC (hepatocellular carcinoma)      History of BPH      History of laparoscopic cholecystectomy      History of lumbar laminectomy      H/O prior ablation treatment      H/O percutaneous left heart catheterization          Vital Signs Last 24 Hrs  T(C): 36.4 (04 Jan 2025 11:00), Max: 36.8 (03 Jan 2025 12:00)  T(F): 97.5 (04 Jan 2025 11:00), Max: 98.2 (03 Jan 2025 12:00)  HR: 105 (04 Jan 2025 11:15) (97 - 118)  BP: 149/65 (04 Jan 2025 11:15) (114/59 - 155/65)  BP(mean): 94 (04 Jan 2025 11:15) (83 - 101)  RR: 23 (04 Jan 2025 11:15) (20 - 40)  SpO2: 97% (04 Jan 2025 11:15) (95% - 100%)    Parameters below as of 04 Jan 2025 09:30  Patient On (Oxygen Delivery Method): tracheostomy collar    O2 Concentration (%): 40    I&O's Summary    03 Jan 2025 07:01  -  04 Jan 2025 07:00  --------------------------------------------------------  IN: 7389.5 mL / OUT: 4275 mL / NET: 3114.5 mL    04 Jan 2025 07:01  -  04 Jan 2025 11:29  --------------------------------------------------------  IN: 585 mL / OUT: 0 mL / NET: 585 mL                              8.8    2.81  )-----------( 32       ( 04 Jan 2025 07:06 )             26.5     01-04    140  |  106  |  40[H]  ----------------------------<  273[H]  3.7   |  18[L]  |  0.43[L]    Ca    8.9      04 Jan 2025 06:56  Phos  3.2     01-04  Mg     2.2     01-04    TPro  4.4[L]  /  Alb  3.2[L]  /  TBili  17.2[H]  /  DBili  x   /  AST  52[H]  /  ALT  112[H]  /  AlkPhos  72  01-04          Culture - Blood (collected 01-02-25 @ 00:30)  Source: .Blood BLOOD  Preliminary Report (01-04-25 @ 05:01):    No growth at 48 Hours    Culture - Blood (collected 01-01-25 @ 01:23)  Source: .Blood BLOOD  Preliminary Report (01-04-25 @ 05:01):    No growth at 48 Hours    Culture - Blood (collected 12-28-24 @ 18:10)  Source: .Blood BLOOD  Gram Stain (01-01-25 @ 20:10):    Growth in anaerobic bottle: Gram Variable Rods  Final Report (01-02-25 @ 22:43):    Growth in anaerobic bottle: Clostridium paraputrificum "Susceptibilities    not performed"    Direct identification is available within approximately 3-5    hours either by Blood Panel Multiplexed PCR or Direct    MALDI-TOF. Details: [email or web address]  Organism: Blood Culture PCR (01-02-25 @ 22:43)  Organism: Blood Culture PCR (01-02-25 @ 22:43)    Culture - Blood (collected 12-28-24 @ 12:15)  Source: .Blood BLOOD  Final Report (01-02-25 @ 18:00):    No growth at 5 days        ROS: Unable to assess patient is lethargic, s/p tracheostomy    PHYSICAL EXAM:   Constitutional: trach to vent, awake, unresponsive  Eyes:  PERRLA, Scleral icterus  ENMT: nc/at, no thrush   Neck: supple, trach   Respiratory: CTA B/L  Cardiovascular: RRR  Gastrointestinal: incision clean/dry/intact + Ostomy pink output stool, wound vac, ALYSSA x2 Bilious fluid  Genitourinary: +scrotal edema w/ large fluid collection; black/green discoloration  Extremities: SCD's in place and working bilaterally, + LE edema  Neurological: trach to vent, not following commands  Skin: large ulcer on R. buttock and thigh, mild amounts of skin breakdown throughout Transplant Surgery - Multidisciplinary Rounds  --------------------------------------------------------------  OLT   11/15/2024         Colectomy 12/7/2024     IABP 12/7 removed 12/10  Tracheostomy 12/17/2024    Present:   Patient seen and examined with multidisciplinary Transplant team including Surgeon: Dr. Hutchinson, Hepatologist Dr. Luis, JAZZ Fisher/Ale/JOHN Ferraro, and bedside RN in AM rounds.   Disciplines not in attendance will be notified of the plan.     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     s/p OLT 11/15 with post op course c/b:  Hypoxia: Reintubated 11/20, extubated 11/24->reintubated 11/24, extubated 11/26, reintubated 12/7, trached 12/17  Ileus   A fib  AMS/Seizure   L brachial DVT  Neutropenia  E coli Bacteremia (12/6)  s/p Colectomy 12/7.   IABP 12/7, removed 12/10  Ec Faecalis UTI (12/16)  Stenotrophamonas in trach aspirate (12/19)  Clostridium in blood 12/23  UGIB 12/26    Interval/Overnight Events:   -Afebrile, off pressor drips on Midodrine 10  -on CRRT net even, anuric and PLEX  -coagulopathy corrected by SICU (3uCryo, 2uPRBC, 3uPlatelets)  -Switched ABX to imipenem overnight  -high ostomy output >1L adding imodium    Immunosuppression:  - Cyclo per level, MMF HELD, Solu 32  -ongoing monitoring for signs of rejection      MEDICATIONS  (STANDING):  buDESOnide    Inhalation Suspension 0.5 milliGRAM(s) Inhalation every 12 hours  caspofungin IVPB      caspofungin IVPB 50 milliGRAM(s) IV Intermittent every 24 hours  chlorhexidine 0.12% Liquid 15 milliLiter(s) Oral Mucosa every 12 hours  chlorhexidine 2% Cloths 1 Application(s) Topical <User Schedule>  CRRT Treatment    <Continuous>  cycloSPORINE  , modified (GENGRAF) Solution 75 milliGRAM(s) Oral <User Schedule>  cycloSPORINE  , modified (GENGRAF) Solution 50 milliGRAM(s) Oral <User Schedule>  diphenoxylate/atropine 2 Tablet(s) Oral every 6 hours  ganciclovir IVPB 250 milliGRAM(s) IV Intermittent every 24 hours  imipenem/cilastatin  IVPB 500 milliGRAM(s) IV Intermittent every 6 hours  insulin lispro (ADMELOG) corrective regimen sliding scale   SubCutaneous every 6 hours  insulin NPH human recombinant 6 Unit(s) SubCutaneous every 6 hours  loperamide Liquid 2 milliGRAM(s) Enteral Tube every 6 hours  methylPREDNISolone sodium succinate Injectable 32 milliGRAM(s) IV Push <User Schedule>  metroNIDAZOLE  IVPB 500 milliGRAM(s) IV Intermittent every 8 hours  midodrine 10 milliGRAM(s) Oral every 8 hours  mupirocin 2% Ointment 1 Application(s) Topical every 12 hours  nystatin Powder 1 Application(s) Topical every 12 hours  pantoprazole  Injectable 40 milliGRAM(s) IV Push every 12 hours  Phoxillum Filtration BK 4 / 2.5 5000 milliLiter(s) (1250 mL/Hr) CRRT <Continuous>  Phoxillum Filtration BK 4 / 2.5 5000 milliLiter(s) (250 mL/Hr) CRRT <Continuous>  phytonadione   Solution 5 milliGRAM(s) Enteral Tube every 24 hours  PrismaSATE Dialysate BK 0 / 3.5 5000 milliLiter(s) (1500 mL/Hr) CRRT <Continuous>  trimethoprim  40 mG/sulfamethoxazole 200 mG Suspension 80 milliGRAM(s) Enteral Tube daily  ursodiol Suspension 300 milliGRAM(s) Oral every 12 hours    MEDICATIONS  (PRN):  albuterol/ipratropium for Nebulization 3 milliLiter(s) Nebulizer every 6 hours PRN Shortness of Breath and/or Wheezing  FIRST- Mouthwash  BLM 10 milliLiter(s) Swish and Spit every 8 hours PRN Mouth Care  oxyCODONE    Solution 5 milliGRAM(s) Oral every 4 hours PRN Severe Pain (7 - 10)  oxyCODONE    Solution 2.5 milliGRAM(s) Oral every 4 hours PRN Moderate Pain (4 - 6)      PAST MEDICAL & SURGICAL HISTORY:  Diabetes      Transaminitis      Paroxysmal atrial fibrillation      Depression      BPH (benign prostatic hyperplasia)      Hypertension      Chronic atrial fibrillation      Coronary artery disease      Hepatocellular carcinoma      DM (diabetes mellitus)      HTN (hypertension)      Paroxysmal atrial fibrillation      Cirrhosis      HCC (hepatocellular carcinoma)      History of BPH      History of laparoscopic cholecystectomy      History of lumbar laminectomy      H/O prior ablation treatment      H/O percutaneous left heart catheterization          Vital Signs Last 24 Hrs  T(C): 36.4 (04 Jan 2025 11:00), Max: 36.8 (03 Jan 2025 12:00)  T(F): 97.5 (04 Jan 2025 11:00), Max: 98.2 (03 Jan 2025 12:00)  HR: 105 (04 Jan 2025 11:15) (97 - 118)  BP: 149/65 (04 Jan 2025 11:15) (114/59 - 155/65)  BP(mean): 94 (04 Jan 2025 11:15) (83 - 101)  RR: 23 (04 Jan 2025 11:15) (20 - 40)  SpO2: 97% (04 Jan 2025 11:15) (95% - 100%)    Parameters below as of 04 Jan 2025 09:30  Patient On (Oxygen Delivery Method): tracheostomy collar    O2 Concentration (%): 40    I&O's Summary    03 Jan 2025 07:01  -  04 Jan 2025 07:00  --------------------------------------------------------  IN: 7389.5 mL / OUT: 4275 mL / NET: 3114.5 mL    04 Jan 2025 07:01  -  04 Jan 2025 11:29  --------------------------------------------------------  IN: 585 mL / OUT: 0 mL / NET: 585 mL                              8.8    2.81  )-----------( 32       ( 04 Jan 2025 07:06 )             26.5     01-04    140  |  106  |  40[H]  ----------------------------<  273[H]  3.7   |  18[L]  |  0.43[L]    Ca    8.9      04 Jan 2025 06:56  Phos  3.2     01-04  Mg     2.2     01-04    TPro  4.4[L]  /  Alb  3.2[L]  /  TBili  17.2[H]  /  DBili  x   /  AST  52[H]  /  ALT  112[H]  /  AlkPhos  72  01-04          Culture - Blood (collected 01-02-25 @ 00:30)  Source: .Blood BLOOD  Preliminary Report (01-04-25 @ 05:01):    No growth at 48 Hours    Culture - Blood (collected 01-01-25 @ 01:23)  Source: .Blood BLOOD  Preliminary Report (01-04-25 @ 05:01):    No growth at 48 Hours    Culture - Blood (collected 12-28-24 @ 18:10)  Source: .Blood BLOOD  Gram Stain (01-01-25 @ 20:10):    Growth in anaerobic bottle: Gram Variable Rods  Final Report (01-02-25 @ 22:43):    Growth in anaerobic bottle: Clostridium paraputrificum "Susceptibilities    not performed"    Direct identification is available within approximately 3-5    hours either by Blood Panel Multiplexed PCR or Direct    MALDI-TOF. Details: [email or web address]  Organism: Blood Culture PCR (01-02-25 @ 22:43)  Organism: Blood Culture PCR (01-02-25 @ 22:43)    Culture - Blood (collected 12-28-24 @ 12:15)  Source: .Blood BLOOD  Final Report (01-02-25 @ 18:00):    No growth at 5 days        ROS: Unable to assess patient is lethargic, s/p tracheostomy    PHYSICAL EXAM:   Constitutional: trach to vent, awake, unresponsive  Eyes:  PERRLA, Scleral icterus  ENMT: nc/at, no thrush   Neck: supple, trach   Respiratory: CTA B/L  Cardiovascular: RRR  Gastrointestinal: incision clean/dry/intact + Ostomy pink output stool, wound vac, ALYSSA x2 Bilious fluid  Genitourinary: +scrotal edema w/ large fluid collection; black/green discoloration  Extremities: SCD's in place and working bilaterally, + LE edema  Neurological: trach to vent, not following commands  Skin: large ulcer on R. buttock and thigh, mild amounts of skin breakdown throughout Transplant Surgery - Multidisciplinary Rounds  --------------------------------------------------------------  OLT   11/15/2024         Colectomy 12/7/2024     IABP 12/7 removed 12/10  Tracheostomy 12/17/2024    Present:   Patient seen and examined with multidisciplinary Transplant team including Surgeon: Dr. Hutchinson, Hepatologist Dr. Luis, JAZZ Fisher/Ale/JOHN Ferraro, and bedside RN in AM rounds.   Disciplines not in attendance will be notified of the plan.     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     s/p OLT 11/15 with post op course c/b:  Hypoxia: Reintubated 11/20, extubated 11/24->reintubated 11/24, extubated 11/26, reintubated 12/7, trached 12/17  Ileus   A fib  AMS/Seizure   L brachial DVT  Neutropenia  E coli Bacteremia (12/6)  s/p Colectomy 12/7.   IABP 12/7, removed 12/10  Ec Faecalis UTI (12/16)  Stenotrophamonas in trach aspirate (12/19)  Clostridium in blood 12/23  UGIB 12/26    Interval/Overnight Events:   -Afebrile, off pressor drips on Midodrine 10  -on CRRT net even, anuric   - PLEX initiated yesterday, tolerated  -coagulopathy corrected by SICU (3uCryo, 2uPRBC, 3uPlatelets)  -Switched ABX to imipenem overnight  -high ostomy output >1L adding imodium    Immunosuppression:  - Cyclo per level, MMF HELD, Solu 32  -ongoing monitoring for signs of rejection      MEDICATIONS  (STANDING):  buDESOnide    Inhalation Suspension 0.5 milliGRAM(s) Inhalation every 12 hours  caspofungin IVPB      caspofungin IVPB 50 milliGRAM(s) IV Intermittent every 24 hours  chlorhexidine 0.12% Liquid 15 milliLiter(s) Oral Mucosa every 12 hours  chlorhexidine 2% Cloths 1 Application(s) Topical <User Schedule>  CRRT Treatment    <Continuous>  cycloSPORINE  , modified (GENGRAF) Solution 75 milliGRAM(s) Oral <User Schedule>  cycloSPORINE  , modified (GENGRAF) Solution 50 milliGRAM(s) Oral <User Schedule>  diphenoxylate/atropine 2 Tablet(s) Oral every 6 hours  ganciclovir IVPB 250 milliGRAM(s) IV Intermittent every 24 hours  imipenem/cilastatin  IVPB 500 milliGRAM(s) IV Intermittent every 6 hours  insulin lispro (ADMELOG) corrective regimen sliding scale   SubCutaneous every 6 hours  insulin NPH human recombinant 6 Unit(s) SubCutaneous every 6 hours  loperamide Liquid 2 milliGRAM(s) Enteral Tube every 6 hours  methylPREDNISolone sodium succinate Injectable 32 milliGRAM(s) IV Push <User Schedule>  metroNIDAZOLE  IVPB 500 milliGRAM(s) IV Intermittent every 8 hours  midodrine 10 milliGRAM(s) Oral every 8 hours  mupirocin 2% Ointment 1 Application(s) Topical every 12 hours  nystatin Powder 1 Application(s) Topical every 12 hours  pantoprazole  Injectable 40 milliGRAM(s) IV Push every 12 hours  Phoxillum Filtration BK 4 / 2.5 5000 milliLiter(s) (1250 mL/Hr) CRRT <Continuous>  Phoxillum Filtration BK 4 / 2.5 5000 milliLiter(s) (250 mL/Hr) CRRT <Continuous>  phytonadione   Solution 5 milliGRAM(s) Enteral Tube every 24 hours  PrismaSATE Dialysate BK 0 / 3.5 5000 milliLiter(s) (1500 mL/Hr) CRRT <Continuous>  trimethoprim  40 mG/sulfamethoxazole 200 mG Suspension 80 milliGRAM(s) Enteral Tube daily  ursodiol Suspension 300 milliGRAM(s) Oral every 12 hours    MEDICATIONS  (PRN):  albuterol/ipratropium for Nebulization 3 milliLiter(s) Nebulizer every 6 hours PRN Shortness of Breath and/or Wheezing  FIRST- Mouthwash  BLM 10 milliLiter(s) Swish and Spit every 8 hours PRN Mouth Care  oxyCODONE    Solution 5 milliGRAM(s) Oral every 4 hours PRN Severe Pain (7 - 10)  oxyCODONE    Solution 2.5 milliGRAM(s) Oral every 4 hours PRN Moderate Pain (4 - 6)      PAST MEDICAL & SURGICAL HISTORY:  Diabetes      Transaminitis      Paroxysmal atrial fibrillation      Depression      BPH (benign prostatic hyperplasia)      Hypertension      Chronic atrial fibrillation      Coronary artery disease      Hepatocellular carcinoma      DM (diabetes mellitus)      HTN (hypertension)      Paroxysmal atrial fibrillation      Cirrhosis      HCC (hepatocellular carcinoma)      History of BPH      History of laparoscopic cholecystectomy      History of lumbar laminectomy      H/O prior ablation treatment      H/O percutaneous left heart catheterization          Vital Signs Last 24 Hrs  T(C): 36.4 (04 Jan 2025 11:00), Max: 36.8 (03 Jan 2025 12:00)  T(F): 97.5 (04 Jan 2025 11:00), Max: 98.2 (03 Jan 2025 12:00)  HR: 105 (04 Jan 2025 11:15) (97 - 118)  BP: 149/65 (04 Jan 2025 11:15) (114/59 - 155/65)  BP(mean): 94 (04 Jan 2025 11:15) (83 - 101)  RR: 23 (04 Jan 2025 11:15) (20 - 40)  SpO2: 97% (04 Jan 2025 11:15) (95% - 100%)    Parameters below as of 04 Jan 2025 09:30  Patient On (Oxygen Delivery Method): tracheostomy collar    O2 Concentration (%): 40    I&O's Summary    03 Jan 2025 07:01  -  04 Jan 2025 07:00  --------------------------------------------------------  IN: 7389.5 mL / OUT: 4275 mL / NET: 3114.5 mL    04 Jan 2025 07:01  -  04 Jan 2025 11:29  --------------------------------------------------------  IN: 585 mL / OUT: 0 mL / NET: 585 mL                              8.8    2.81  )-----------( 32       ( 04 Jan 2025 07:06 )             26.5     01-04    140  |  106  |  40[H]  ----------------------------<  273[H]  3.7   |  18[L]  |  0.43[L]    Ca    8.9      04 Jan 2025 06:56  Phos  3.2     01-04  Mg     2.2     01-04    TPro  4.4[L]  /  Alb  3.2[L]  /  TBili  17.2[H]  /  DBili  x   /  AST  52[H]  /  ALT  112[H]  /  AlkPhos  72  01-04          Culture - Blood (collected 01-02-25 @ 00:30)  Source: .Blood BLOOD  Preliminary Report (01-04-25 @ 05:01):    No growth at 48 Hours    Culture - Blood (collected 01-01-25 @ 01:23)  Source: .Blood BLOOD  Preliminary Report (01-04-25 @ 05:01):    No growth at 48 Hours    Culture - Blood (collected 12-28-24 @ 18:10)  Source: .Blood BLOOD  Gram Stain (01-01-25 @ 20:10):    Growth in anaerobic bottle: Gram Variable Rods  Final Report (01-02-25 @ 22:43):    Growth in anaerobic bottle: Clostridium paraputrificum "Susceptibilities    not performed"    Direct identification is available within approximately 3-5    hours either by Blood Panel Multiplexed PCR or Direct    MALDI-TOF. Details: [email or web address]  Organism: Blood Culture PCR (01-02-25 @ 22:43)  Organism: Blood Culture PCR (01-02-25 @ 22:43)    Culture - Blood (collected 12-28-24 @ 12:15)  Source: .Blood BLOOD  Final Report (01-02-25 @ 18:00):    No growth at 5 days        ROS: Unable to assess patient is lethargic, s/p tracheostomy    PHYSICAL EXAM:   Constitutional: trach to vent, awake, unresponsive  Eyes:  PERRLA, Scleral icterus  ENMT: nc/at, no thrush, NGT  Neck: supple, trach   Respiratory: CTA B/L  Cardiovascular: RRR  Gastrointestinal: incision clean/dry/intact + Ostomy pink output stool, wound vac, ALYSSA x2 ss  Genitourinary: +scrotal edema w/ large fluid collection; black/green discoloration  Extremities: SCD's in place and working bilaterally, + LE edema  Neurological: trach to vent, not following commands  Skin: large sacral decub, poor healing with breakdown, wound care applied

## 2025-01-04 NOTE — PROGRESS NOTE ADULT - ASSESSMENT
73 year old man with GONZALES cirrhosis and HCC, HTN, DM, AF, BPH  Underwent liver transplant on  11/15/24. Post-op course complicated by  septic shock in the setting of ischemic bowel s/p total colectomy and ileostomy creation 12/9/24  Has poor mental status, acute hypoxic respiratory failure requiring multiple intubations/extubations, eventually had tracheostomy on 12/17/24  Course further complicated by recurrent infections:  Candida glabrata in bronchial asp (11/24), E. coli Bacteremia (12/6), high fungitell 188 (12/14), E.Faecalis UTI (12/16), Stenotrophomonas in trach aspirate (12/19), clostridium paraputrificum bacteremia 12/23  On Caspofungin, metronidazole and imipenem.  Completed 10 day course of minocycline 12/21-1/1    Oliguric MORAIMA s/p OLT in the setting of septic shock and ischemic colitis requiring CRRT since 12/7/24   Remains oligoanuric  Hemodynamically stable now off pressors   Started plasmapheresis yesterday for alfonso/AMS - planned for 2nd session today   Will resume CRRT post plex, can transition to iHD tomorrow if he remains stable       OLT on cyclosporine and Methylpred, ppx Ganciclovir, Bactrim .

## 2025-01-04 NOTE — PROGRESS NOTE ADULT - ASSESSMENT
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   s/p OLT 11/15 with complicated post op course    [] Septic shock/Cardiogenic shock  [] s/p Colectomy 12/7   [] RTOR for closure and Ileostomy creation   [] IAPB 12/7- removed 12/10  - E coli Bacteremia (12/6)  Received Tobra x 1 12/6 .   - EC faecalis asc fluid ( 12/20) (12/26)  - Ec faecalis UTI (12/16)  - Tx ID following - on Imipenem/Caspo/Flagyl/Bactrim DS   - Neutropenia: received Neupogen 480mcg x2  - MORAIMA: CRRT started 12/7, stopped 12/15, resumed CRRT 12/23  - 12/23 BCX: Clostridium  - 12/23 CT chest/Abd/pelvis: GGO, splenic infarcts, ileus  - 1/1 CT CAP: small pleural effusions, enteritis, rest ok  - repeat cx 12/28 clostridium, repeat cx sent 1/1 NGTD  - S/P tracheotomy 12/17  - Change out all lines 1/3  - Wound care for sacral decubitus ulcer      [ ] UGIB s/p EGD showing generalized oozing, coagulopathy  - Correct coagulopathy per SICU  - Protonix BID  - TF at goal  - Vitamin K 3 days ends 1/5/25    [] s/p OLT  - poor graft function, trial of plasmapheresis 1/3 for (3-5 days)  - repeat liver US unchanged  - Diet: TFs to goal  - Pain management: avoid narcotics  - Strict I&Os, wound vac placed 12/10   - US: L brachial DVT (holding A/C)  - wound vac exchange for ileostomy (12/13)  - HIT panel neg (12/14)  - ursodiol 300mgBID     [ ] Immunosuppression  - Tacrolimus stopped 11/18 in setting of AMS/Seizure, Started Cyclo 12/17  - Cyclo per level, MMF HELD, Solumedrol 32 mg daily    [] lleus -- resolving  -@ home on Linzess  - imaging with distended colon, improving, (likely opioid induced)  - s/p Neostigmine x 2  - s/p Relistor, last dose 12/1  - s/p colectomy with end ileostomy  (see above)  - ileostomy with >1L output, lomotil and imodium as needed, and change tube feeds  - Colorectal consulted    [] CMV viremia  - CMV PCR (12/11 and 12/16): neg, positive CMV PCR on 12/23  - PPx: Gancyclovir (HELD) in setting of thrombocytopenia initially due to low PLT  - CMV viral load 537 12/31, will attempt Ganciclovir again     [ ] AMS  - Reintubated 11/20 Aspiration/hypoxia, extubated 11/24->[id number]/24--> extubated 11/26 -> [id number]/7 -> tracheostomy 12/17 -> trach collar trials starting 12/29  - EEG 11/30 negative, Neurology following  - BH following   - off tacro  - on keppra  -CT Head No Cont (12/20): Age-indeterminate posterior left frontal lobe infarct.   -CT Head (12/25): Evolving subacute infarct in the left supramarginal gyrus  -repeat CTH ok    [ ] HTN/ pAFib  [ ] coronary vasospasm vs posterior wall MI  - EKG 12/24 c/f ST depression, rising troponins: 1800s  - d/c argatroban and aspirin due to bleeding  - Heparin gtt 12/19-21  - Cards- plan for PCI prior to DC   - Off Amiodarone, off dobutamine  - Off metoprolol for soft bp.  - Dr. 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   s/p OLT 11/15 with complicated post op course    [] Septic shock/Cardiogenic shock  [] s/p Colectomy 12/7   [] RTOR for closure and Ileostomy creation   [] IAPB 12/7- removed 12/10  -> E Coli Bacteremia 12/6  -> Ec faecalis UTI (12/16)  -> Stenotrophaonas in trach apirate (12/19)  -> Ec Faecalis in Asc fluid (12/20) (12/26)  -> Clostridium paraputrifucum in blood 12/23, cultures have since cleared  - Tx ID following - on Imipenem/Caspo/Flagyl/Bactrim DS   - 1/1 CT CAP: small pleural effusions, enteritis, rest ok  - all lines changed over 1/3  - Wound care for sacral decubitus ulcer    [] MORAIMA:   -CRRT started 12/7, stopped 12/15, resumed CRRT 12/23      [ ] UGIB s/p EGD showing generalized oozing, coagulopathy  - Correct coagulopathy per SICU  - Protonix BID  - TF at goal  - Vitamin K 3 days ends 1/5/25    [] s/p OLT  - poor graft function, trial of plasmapheresis 1/3 for (3-5 days)  - repeat liver US unchanged  - Diet: TFs to goal  - Pain management: avoid narcotics  - Strict I&Os, wound vac placed 12/10   - US: L brachial DVT (holding A/C)  - wound vac care  - ursodiol 300mgBID     [ ] Immunosuppression  - Tacrolimus stopped 11/18 in setting of AMS/Seizure, Started Cyclo 12/17  - Cyclo per level, MMF HELD, Solumedrol 32 mg daily    [] lleus -- resolving  -@ home on Linzess  - imaging with distended colon, improving, (likely opioid induced)  - s/p Neostigmine x 2  - s/p Relistor, last dose 12/1  - s/p colectomy with end ileostomy  (see above)  - ileostomy with >1L output, lomotil and imodium as needed, and change tube feeds  - Colorectal following  - replace losses as able    [] CMV viremia  - CMV PCR (12/11 and 12/16): neg, positive CMV PCR on 12/23  - PPx: Gancyclovir (HELD) in setting of thrombocytopenia initially due to low PLT  - CMV viral load 537 12/31, will attempt Ganciclovir again, next check 1/7    [ ] AMS  - Reintubated 11/20 Aspiration/hypoxia, extubated 11/24->busukeqshea72/24--> extubated 11/26 -> ucowldsyyzu22/7 -> tracheostomy 12/17 -> trach collar trials starting 12/29  - EEG 11/30 negative, Neurology following  - BH following   - off tacro  - on keppra  -CT Head No Cont (12/20): Age-indeterminate posterior left frontal lobe infarct.   -CT Head (12/25): Evolving subacute infarct in the left supramarginal gyrus  -repeat CTH ok, poor MS    [ ] HTN/ pAFib  [ ] coronary vasospasm vs posterior wall MI  - remains off all a/c, failed hep gtt/argatroban due to UGIB  - Cards- plan for PCI prior to DC   - Off Amiodarone, off dobutamine  - BB as needed  - Dr. Quintanilla following    [ ] DM  - per SICU     [] Scrotal abscess   - US (12/12): 2.1 x0.9 x 3.2 cm focal, right testicle- possible abscess (12/12)  - Urology recs: CT scrotum review no debridement required  - Urology recs Derm consult for guidance on wound care   - 12/23 US doppler scrotum: no arterial flow, limited venous flow, bl hydrocele  - 12/15 CT CAP no acute changes   - Elevate scrotum w/ support Urology signed off 12/29

## 2025-01-04 NOTE — PROGRESS NOTE ADULT - SUBJECTIVE AND OBJECTIVE BOX
Transplant ID follow up     Afebrile  Repeat Cx NGTD  off pressors      Vital Signs Last 24 Hrs  T(C): 36.4 (2025 11:00), Max: 36.7 (2025 03:00)  T(F): 97.5 (2025 11:00), Max: 98.1 (2025 03:00)  HR: 107 (2025 12:15) (97 - 118)  BP: 149/64 (2025 12:15) (114/59 - 155/65)  BP(mean): 92 (2025 12:15) (83 - 101)  RR: 24 (2025 12:15) (20 - 40)  SpO2: 98% (2025 12:15) (95% - 100%)    Parameters below as of 2025 09:30  Patient On (Oxygen Delivery Method): tracheostomy collar    O2 Concentration (%): 40      EXAM:  General: Patient in no acute distress, intubated   CV: S1+S2, no m/r/g appreciated   Lungs: No respiratory distress, CTAB  Abd: Soft, no guarding, no rebound tenderness,  Ext: No cyanosis, no edema  Neuro: Intubated   Skin: No rash   IV: No phlebitis        RADIOLOGY:  imaging below personally reviewed        ____________________________________________________  ROS  GENERAL: denies chills, , night sweats, weight loss.   PSYCH: denies depression, anxiety, suicidal ideation, hallucination, and delusions  SKIN: no rash or lesions; no color changes, no abnormal nevi,no  dryness, and nojaundice    EYES: denies visual changes, floaters, pain, inflammation, blurred vision, and discharge  ENT: denies tinnitus, vertigo, epistaxis, oral lesion, and decreased acuity  PULM: denies, hemoptysis, pleurisy  CVS: denies angina, palpitations,+ orthopnea, no syncope, or heart murmur  GI: denies constipation, diarrhea, melena, abdominal pain, nausea.   : denies dysuria, frequency, discharge, incontinence, stones or macroscopic hematuria  MS: no arthralgias, no erythema or swelling, no myalgias, noedema, or lower back pain.   CNS: denies numbness, dizziness, seizure, or tremor  ENDO: denies heat/cold intolerance, polyuria, polydipsia, malaise.    HEME: denies bruising, bleeding, lymphadenopathy, anemia, and calf pain    Allergies  No Known Allergies    __________________________________________________  MEDS:  MEDICATIONS  (STANDING):  albuterol/ipratropium for Nebulization 3 every 6 hours PRN  buDESOnide    Inhalation Suspension 0.5 every 12 hours  cycloSPORINE  , modified (GENGRAF) Solution 75 <User Schedule>  cycloSPORINE  , modified (GENGRAF) Solution 50 <User Schedule>  diphenoxylate/atropine 2 every 6 hours  insulin lispro (ADMELOG) corrective regimen sliding scale  every 6 hours  insulin NPH human recombinant 6 every 6 hours  loperamide Liquid 2 every 6 hours  methylPREDNISolone sodium succinate Injectable 32 <User Schedule>  midodrine 10 every 8 hours  oxyCODONE    Solution 5 every 4 hours PRN  oxyCODONE    Solution 2.5 every 4 hours PRN  pantoprazole  Injectable 40 every 12 hours  ursodiol Suspension 300 every 12 hours    _________________________________________________  ANTIMICOBIALS  caspofungin IVPB    caspofungin IVPB 50 every 24 hours  cycloSPORINE  , modified (GENGRAF) Solution 75 <User Schedule>  cycloSPORINE  , modified (GENGRAF) Solution 50 <User Schedule>  ganciclovir IVPB 250 every 24 hours  imipenem/cilastatin  IVPB 500 every 6 hours  metroNIDAZOLE  IVPB 500 every 8 hours  trimethoprim  40 mG/sulfamethoxazole 200 mG Suspension 80 daily      GENERAL LABS              8.2                  146  | 20   | 44           3.13  >-----------< 39      ------------------------< 311                   23.9                 3.5  | 105  | 0.54                                         Ca 9.2   Mg 2.3   Ph 4.2      Vancomycin Level, Random: <4.0 ( @ 06:19)  Vancomycin Level, Random: 9.0 ( @ 05:52)  Vancomycin Level, Random: 15.0 ( @ 06:24)  Vancomycin Level, Random: 9.3 ( @ 06:10)  Vancomycin Level, Random: 16.6 ( @ 06:25)  Vancomycin Level, Random: 16.3 ( @ 06:15)  Vancomycin Level, Random: 11.4 ( @ 06:17)  Vancomycin Level, Random: 4.4 ( @ 06:30)  Vancomycin Level, Random: 8.5 ( @ 06:25)    Urinalysis Basic - ( 2025 12:07 )    Color: x / Appearance: x / SG: x / pH: x  Gluc: 311 mg/dL / Ketone: x  / Bili: x / Urobili: x   Blood: x / Protein: x / Nitrite: x   Leuk Esterase: x / RBC: x / WBC x   Sq Epi: x / Non Sq Epi: x / Bacteria: x        _________________________________________________  MICROBIOLOGY  -----------    Culture - Blood (collected 2025 00:30)  Source: .Blood BLOOD  Preliminary Report (2025 05:01):    No growth at 48 Hours    Culture - Blood (collected 2025 01:23)  Source: .Blood BLOOD  Preliminary Report (2025 05:01):    No growth at 48 Hours            CMVPCR Lo.73 Rax16GD/mL ( @ 13:36)    Clostridium difficile GDH Toxins A&amp;B, EIA:   Negative (25 @ 12:59)  Clostridium difficile GDH Interpretation: Negative for toxigenic C. Difficile.  This specimen is negative for C.  Difficile glutamate dehydrogenase (GDH) antigen and negative for C.  Difficile Toxins A & B, by EIA.  GDH is a highly sensitive screening  marker for C. Difficile that is produced in large amounts by all C.  Difficile strains, both toxigenic and nontoxigenic.  This assay has not  been validated as a test of cure.  Repeat testing during the same episode  of diarrhea is of limited value and is discouraged.  The results of this  assay should always be interpreted in conjunction with patient's clinical  history. (25 @ 12:59)        Fungitell:   _______________________________________________  PERTINENT IMAGING   Transplant ID follow up     Afebrile  Repeat Cx NGTD  off pressors  awake, tracking but encephalopathic    Vital Signs Last 24 Hrs  T(C): 36.4 (2025 11:00), Max: 36.7 (2025 03:00)  T(F): 97.5 (2025 11:00), Max: 98.1 (2025 03:00)  HR: 107 (2025 12:15) (97 - 118)  BP: 149/64 (2025 12:15) (114/59 - 155/65)  BP(mean): 92 (2025 12:15) (83 - 101)  RR: 24 (2025 12:15) (20 - 40)  SpO2: 98% (2025 12:15) (95% - 100%)    Parameters below as of 2025 09:30  Patient On (Oxygen Delivery Method): tracheostomy collar    O2 Concentration (%): 40      EXAM:  General: Patient in no acute distress, intubated   CV: S1+S2, no m/r/g appreciated   Lungs: No respiratory distress, CTAB  Abd: Soft, no guarding, no rebound tenderness,  wound vac in place  distended  Ext: No cyanosis, no edema  Neuro: Intubated   Skin: No rash   IV: No phlebitis  scrotal necrosis        RADIOLOGY:  imaging below personally reviewed        ____________________________________________________  ROS  GENERAL: denies chills, , night sweats, weight loss.   PSYCH: denies depression, anxiety, suicidal ideation, hallucination, and delusions  SKIN: no rash or lesions; no color changes, no abnormal nevi,no  dryness, and nojaundice    EYES: denies visual changes, floaters, pain, inflammation, blurred vision, and discharge  ENT: denies tinnitus, vertigo, epistaxis, oral lesion, and decreased acuity  PULM: denies, hemoptysis, pleurisy  CVS: denies angina, palpitations,+ orthopnea, no syncope, or heart murmur  GI: denies constipation, diarrhea, melena, abdominal pain, nausea.   : denies dysuria, frequency, discharge, incontinence, stones or macroscopic hematuria  MS: no arthralgias, no erythema or swelling, no myalgias, noedema, or lower back pain.   CNS: denies numbness, dizziness, seizure, or tremor  ENDO: denies heat/cold intolerance, polyuria, polydipsia, malaise.    HEME: denies bruising, bleeding, lymphadenopathy, anemia, and calf pain    Allergies  No Known Allergies    __________________________________________________  MEDS:  MEDICATIONS  (STANDING):  albuterol/ipratropium for Nebulization 3 every 6 hours PRN  buDESOnide    Inhalation Suspension 0.5 every 12 hours  cycloSPORINE  , modified (GENGRAF) Solution 75 <User Schedule>  cycloSPORINE  , modified (GENGRAF) Solution 50 <User Schedule>  diphenoxylate/atropine 2 every 6 hours  insulin lispro (ADMELOG) corrective regimen sliding scale  every 6 hours  insulin NPH human recombinant 6 every 6 hours  loperamide Liquid 2 every 6 hours  methylPREDNISolone sodium succinate Injectable 32 <User Schedule>  midodrine 10 every 8 hours  oxyCODONE    Solution 5 every 4 hours PRN  oxyCODONE    Solution 2.5 every 4 hours PRN  pantoprazole  Injectable 40 every 12 hours  ursodiol Suspension 300 every 12 hours    _________________________________________________  ANTIMICOBIALS  caspofungin IVPB    caspofungin IVPB 50 every 24 hours  cycloSPORINE  , modified (GENGRAF) Solution 75 <User Schedule>  cycloSPORINE  , modified (GENGRAF) Solution 50 <User Schedule>  ganciclovir IVPB 250 every 24 hours  imipenem/cilastatin  IVPB 500 every 6 hours  metroNIDAZOLE  IVPB 500 every 8 hours  trimethoprim  40 mG/sulfamethoxazole 200 mG Suspension 80 daily      GENERAL LABS              8.2                  146  | 20   | 44           3.13  >-----------< 39      ------------------------< 311                   23.9                 3.5  | 105  | 0.54                                         Ca 9.2   Mg 2.3   Ph 4.2      Vancomycin Level, Random: <4.0 ( @ 06:19)  Vancomycin Level, Random: 9.0 ( @ 05:52)  Vancomycin Level, Random: 15.0 ( @ 06:24)  Vancomycin Level, Random: 9.3 ( @ 06:10)  Vancomycin Level, Random: 16.6 ( @ 06:25)  Vancomycin Level, Random: 16.3 ( @ 06:15)  Vancomycin Level, Random: 11.4 ( @ 06:17)  Vancomycin Level, Random: 4.4 ( @ 06:30)  Vancomycin Level, Random: 8.5 ( @ 06:25)    Urinalysis Basic - ( 2025 12:07 )    Color: x / Appearance: x / SG: x / pH: x  Gluc: 311 mg/dL / Ketone: x  / Bili: x / Urobili: x   Blood: x / Protein: x / Nitrite: x   Leuk Esterase: x / RBC: x / WBC x   Sq Epi: x / Non Sq Epi: x / Bacteria: x        _________________________________________________  MICROBIOLOGY  -----------    Culture - Blood (collected 2025 00:30)  Source: .Blood BLOOD  Preliminary Report (2025 05:01):    No growth at 48 Hours    Culture - Blood (collected 2025 01:23)  Source: .Blood BLOOD  Preliminary Report (2025 05:01):    No growth at 48 Hours            CMVPCR Lo.73 Dgm26AE/mL ( @ 13:36)    Clostridium difficile GDH Toxins A&amp;B, EIA:   Negative (25 @ 12:59)  Clostridium difficile GDH Interpretation: Negative for toxigenic C. Difficile.  This specimen is negative for C.  Difficile glutamate dehydrogenase (GDH) antigen and negative for C.  Difficile Toxins A & B, by EIA.  GDH is a highly sensitive screening  marker for C. Difficile that is produced in large amounts by all C.  Difficile strains, both toxigenic and nontoxigenic.  This assay has not  been validated as a test of cure.  Repeat testing during the same episode  of diarrhea is of limited value and is discouraged.  The results of this  assay should always be interpreted in conjunction with patient's clinical  history. (25 @ 12:59)        Fungitell:   _______________________________________________  PERTINENT IMAGING

## 2025-01-04 NOTE — PROGRESS NOTE ADULT - SUBJECTIVE AND OBJECTIVE BOX
HPI:  Patient seen and examined at bedside in SICU.    Review Of Systems:           Respiratory: No shortness of breath, cough, or wheezing  Cardiovascular: No chest pain or palpitations  10 point review of systems is otherwise negative except as mentioned above        Medications:  albuterol/ipratropium for Nebulization 3 milliLiter(s) Nebulizer every 6 hours PRN  buDESOnide    Inhalation Suspension 0.5 milliGRAM(s) Inhalation every 12 hours  caspofungin IVPB      caspofungin IVPB 50 milliGRAM(s) IV Intermittent every 24 hours  chlorhexidine 0.12% Liquid 15 milliLiter(s) Oral Mucosa every 12 hours  chlorhexidine 2% Cloths 1 Application(s) Topical <User Schedule>  CRRT Treatment    <Continuous>  cycloSPORINE  , modified (GENGRAF) Solution 75 milliGRAM(s) Oral <User Schedule>  cycloSPORINE  , modified (GENGRAF) Solution 50 milliGRAM(s) Oral <User Schedule>  diphenoxylate/atropine 2 Tablet(s) Oral every 6 hours  FIRST- Mouthwash  BLM 10 milliLiter(s) Swish and Spit every 8 hours PRN  ganciclovir IVPB 250 milliGRAM(s) IV Intermittent every 24 hours  imipenem/cilastatin  IVPB 500 milliGRAM(s) IV Intermittent every 6 hours  insulin lispro (ADMELOG) corrective regimen sliding scale   SubCutaneous every 6 hours  insulin NPH human recombinant 6 Unit(s) SubCutaneous every 6 hours  loperamide Liquid 2 milliGRAM(s) Enteral Tube every 6 hours  methylPREDNISolone sodium succinate Injectable 32 milliGRAM(s) IV Push <User Schedule>  metoprolol tartrate 25 milliGRAM(s) Oral every 12 hours  metroNIDAZOLE  IVPB 500 milliGRAM(s) IV Intermittent every 8 hours  midodrine 10 milliGRAM(s) Oral every 8 hours  mupirocin 2% Ointment 1 Application(s) Topical every 12 hours  nystatin Powder 1 Application(s) Topical every 12 hours  oxyCODONE    Solution 5 milliGRAM(s) Oral every 4 hours PRN  oxyCODONE    Solution 2.5 milliGRAM(s) Oral every 4 hours PRN  pantoprazole  Injectable 40 milliGRAM(s) IV Push every 12 hours  Phoxillum Filtration BK 4 / 2.5 5000 milliLiter(s) CRRT <Continuous>  Phoxillum Filtration BK 4 / 2.5 5000 milliLiter(s) CRRT <Continuous>  phytonadione   Solution 5 milliGRAM(s) Enteral Tube every 24 hours  PrismaSATE Dialysate BK 0 / 3.5 5000 milliLiter(s) CRRT <Continuous>  trimethoprim  40 mG/sulfamethoxazole 200 mG Suspension 80 milliGRAM(s) Enteral Tube daily  ursodiol Suspension 300 milliGRAM(s) Oral every 12 hours    PAST MEDICAL & SURGICAL HISTORY:  Diabetes      Transaminitis      Paroxysmal atrial fibrillation      Depression      BPH (benign prostatic hyperplasia)      Hypertension      Chronic atrial fibrillation      Coronary artery disease      Hepatocellular carcinoma      DM (diabetes mellitus)      HTN (hypertension)      Paroxysmal atrial fibrillation      Cirrhosis      HCC (hepatocellular carcinoma)      History of BPH      History of laparoscopic cholecystectomy      History of lumbar laminectomy      H/O prior ablation treatment      H/O percutaneous left heart catheterization        Vitals:  T(C): 36.3 (01-04-25 @ 15:00), Max: 36.7 (01-04-25 @ 03:00)  HR: 127 (01-04-25 @ 18:17) (92 - 130)  BP: 122/61 (01-04-25 @ 18:15) (114/59 - 172/76)  BP(mean): 86 (01-04-25 @ 18:15) (83 - 113)  RR: 21 (01-04-25 @ 18:15) (20 - 38)  SpO2: 99% (01-04-25 @ 18:17) (93% - 100%)  Wt(kg): --  Daily     Daily   I&O's Summary    03 Jan 2025 07:01  -  04 Jan 2025 07:00  --------------------------------------------------------  IN: 7389.5 mL / OUT: 4275 mL / NET: 3114.5 mL    04 Jan 2025 07:01  -  04 Jan 2025 18:26  --------------------------------------------------------  IN: 2065 mL / OUT: 1457 mL / NET: 608 mL        Physical Exam:  Appearance: Critically ill  Eyes: PERRL, EOMI, pink conjunctiva  HENT: +Trach, +NGT  Cardiovascular: RRR, S1, S2  Respiratory: Clear to auscultation bilaterally  Gastrointestinal: soft, non-tender, non-distended with normal bowel sounds  Musculoskeletal: No clubbing; no joint deformity   Neurologic: Non-focal  Skin: sacral decubitus ulcer and scrotal necrosis noted                        8.8    2.78  )-----------( 32       ( 04 Jan 2025 17:47 )             26.3     01-04    145  |  108  |  49[H]  ----------------------------<  413[H]  3.5   |  20[L]  |  0.55    Ca    9.8      04 Jan 2025 17:47  Phos  4.1     01-04  Mg     2.2     01-04    TPro  4.3[L]  /  Alb  2.9[L]  /  TBili  12.5[H]  /  DBili  x   /  AST  44[H]  /  ALT  93[H]  /  AlkPhos  75  01-04    PT/INR - ( 04 Jan 2025 17:47 )   PT: 19.3 sec;   INR: 1.70 ratio         PTT - ( 04 Jan 2025 17:47 )  PTT:31.6 sec      Cardiovascular Diagnostic Testing:  ECG: sinus rhythm    Echo: < from: Intra-Operative Transesophageal Echo W or WO Ultrasound Enhancing Agent (11.15.24 @ 07:46) >  --------------------------------------------------------------------------------  PRE-BYPASS FINDINGS     Left Ventricle:  Left ventricular ejection fraction is estimated at 60 to 65%. Normal left ventricularwall thickness. The left ventricular systolic function is normal There are no regional wall motion abnormalities seen.     Right Ventricle:  The right ventricular cavity is normal in size, with normal wall thickness and right ventricular systolic function is normal. Right ventricular systolic function is normal.     Left Atrium:  The left atrium is normal in size. No thrombus visualized in left atrial appendage.     Right Atrium:  The right atrium is normal in size. The right atrium is normal in size.     Interatrial Septum:  No PFO visualized with color flow doppler.     Aortic Valve:  The aortic valve appears trileaflet. There is no aortic valve stenosis. There is trace aortic regurgitation.     Mitral Valve:  There is no mitral valve stenosis. There is no mitral valve stenosis. There is trace mitral regurgitation.     Tricuspid Valve:  There is no evidence of tricuspid stenosis. There is trace tricuspid regurgitation.     Pericardium:  No pericardial effusion seen.     Post-Bypass:  S/P OLT. Under current loading conditions, hyperdynamic left ventricular systolic function, EF: 70-75% by visual estimate, no RWMA. Normal right ventricular systolic function. All other findings unchanged. Probe removed atraumatically, no blood. The patient tolerated the procedure well and without complications. Permanent recorded images are stored in the medical record.     Electronically signed by James Villareal on 11/15/2024 at 2:18:16 PM         *** Final ***    < end of copied text >      < from: TTE Limited W or WO Ultrasound Enhancing Agent (12.11.24 @ 09:28) >  _______________________________________________________________________________________     CONCLUSIONS:      1. Endocardial visualization enhanced with Definity (Ultrasound Enhancing Agent).   2. Left ventricular systolic function is normal with an ejection fraction visually estimated at 55 to 60 %.   3. Enlarged right ventricular cavity size and mildly reduced right ventricular systolic function.   4. Device lead is visualized in the right heart.    ________________________________________________________________________________________    < end of copied text >      Stress Testing:     Cath: 4/24/2024, patient had his LHC repeated with Ben Villareal MD at [facility].  Cath showed multivessel coronary artery disease, but the lesions previously noted were FFR negative.  He continues to have MIRTA 3 flow throughout.    Interpretation of Telemetry: Sinus     Imaging:

## 2025-01-04 NOTE — PROGRESS NOTE ADULT - SUBJECTIVE AND OBJECTIVE BOX
HISTORY  74y Male    24 HOUR EVENTS:  INTERVAL EVENTS:  - BCx positive for clostridium paraputrificum   - imipenem started  - added immodium  - vitK 5 PO for 3 days   - 1 rbc, 1 platelet, 1 cryo      NEURO  Exam: arousable  Meds: oxyCODONE    Solution 5 milliGRAM(s) Oral every 4 hours PRN Severe Pain (7 - 10)  oxyCODONE    Solution 2.5 milliGRAM(s) Oral every 4 hours PRN Moderate Pain (4 - 6)    [x] Adequacy of sedation and pain control has been assessed and adjusted      RESPIRATORY  RR: 24 (01-04-25 @ 00:30) (23 - 40)  SpO2: 99% (01-04-25 @ 00:30) (96% - 100%)  Wt(kg): --  Exam: unlabored, clear to auscultation bilaterally  Mechanical Ventilation: Mode: AC/ CMV (Assist Control/ Continuous Mandatory Ventilation), RR (machine): 14, RR (patient): 29, TV (machine): 450, FiO2: 30, PEEP: 5, ITime: 1, MAP: 9.3, PIP: 17  ABG - ( 03 Jan 2025 22:05 )  pH: 7.38  /  pCO2: 33    /  pO2: 118   / HCO3: 20    / Base Excess: -5.0  /  SaO2: 99.0    Lactate: x                [N/A] Extubation Readiness Assessed  Meds: albuterol/ipratropium for Nebulization 3 milliLiter(s) Nebulizer every 6 hours PRN Shortness of Breath and/or Wheezing  buDESOnide    Inhalation Suspension 0.5 milliGRAM(s) Inhalation every 12 hours        CARDIOVASCULAR  HR: 107 (01-04-25 @ 00:30) (97 - 118)  BP: 151/76 (01-03-25 @ 20:36) (116/60 - 151/76)  BP(mean): 101 (01-03-25 @ 20:36) (83 - 101)  ABP: 118/60 (01-04-25 @ 00:30) (95/43 - 140/65)  ABP(mean): 82 (01-04-25 @ 00:30) (63 - 97)  Wt(kg): --  CVP(cm H2O): --      Exam: regular rate and rhythm  Cardiac Rhythm: sinus  Perfusion     [x]Adequate   [ ]Inadequate  Extremities  [x]Warm         [ ]Cool  Volume Status [ ]Hypervolemic [x]Euvolemic [ ]Hypovolemic  Meds: midodrine 10 milliGRAM(s) Oral every 8 hours        GI/NUTRITION  Exam: soft, nontender, nondistended  Diet: tube feeds  Meds: diphenoxylate/atropine 2 Tablet(s) Oral every 6 hours  loperamide Liquid 2 milliGRAM(s) Enteral Tube every 6 hours  pantoprazole  Injectable 40 milliGRAM(s) IV Push every 12 hours  ursodiol Suspension 300 milliGRAM(s) Oral every 12 hours      GENITOURINARY  I&O's Detail    01-02 @ 07:01 - 01-03 @ 07:00  --------------------------------------------------------  IN:    Albumin 5%  - 250 mL: 500 mL    Albumin 5%  - 500 mL: 2000 mL    Enteral Tube Flush: 590 mL    IV PiggyBack: 100 mL    IV PiggyBack: 250 mL    IV PiggyBack: 700 mL    Nepro with Carb Steady: 1320 mL    Phenylephrine: 242.9 mL    Platelets - Single Donor: 225 mL    PRBCs (Packed Red Blood Cells): 300 mL    Vasopressin: 36 mL  Total IN: 6263.9 mL    OUT:    Bulb (mL): 50 mL    Bulb (mL): 275 mL    Ileostomy (mL): 2525 mL    Indwelling Catheter - Urethral (mL): 33 mL    Other (mL): 2019 mL    VAC (Vacuum Assisted Closure) System (mL): 0 mL  Total OUT: 4902 mL    Total NET: 1361.9 mL      01-03 @ 07:01 - 01-04 @ 00:34  --------------------------------------------------------  IN:    Albumin 5%  - 500 mL: 1250 mL    Cryoprecipitate: 75 mL    Enteral Tube Flush: 730 mL    IV PiggyBack: 100 mL    IV PiggyBack: 100 mL    IV PiggyBack: 650 mL    Nepro with Carb Steady: 275 mL    Phenylephrine: 149.5 mL    Platelets - Single Donor: 525 mL    PRBCs (Packed Red Blood Cells): 900 mL    Vital1.5: 715 mL  Total IN: 5469.5 mL    OUT:    Bulb (mL): 20 mL    Bulb (mL): 450 mL    Ileostomy (mL): 2800 mL    Indwelling Catheter - Urethral (mL): 5 mL    Other (mL): 145 mL    VAC (Vacuum Assisted Closure) System (mL): 0 mL    Vasopressin: 0 mL  Total OUT: 3420 mL    Total NET: 2049.5 mL          01-03    141  |  104  |  26[H]  ----------------------------<  209[H]  3.6   |  18[L]  |  0.33[L]    Ca    8.9      03 Jan 2025 18:08  Phos  3.9     01-03  Mg     2.3     01-03    TPro  4.3[L]  /  Alb  3.1[L]  /  TBili  13.2[H]  /  DBili  x   /  AST  49[H]  /  ALT  92[H]  /  AlkPhos  64  01-03    Meds: albumin human  5% IVPB 500 milliLiter(s) IV Intermittent every 6 hours  phytonadione   Solution 5 milliGRAM(s) Enteral Tube every 24 hours        HEMATOLOGIC  Meds:   [x] VTE Prophylaxis                        8.0    2.90  )-----------( 37       ( 03 Jan 2025 22:14 )             23.6     PT/INR - ( 03 Jan 2025 18:08 )   PT: 17.2 sec;   INR: 1.50 ratio         PTT - ( 03 Jan 2025 18:08 )  PTT:33.8 sec      INFECTIOUS DISEASES  WBC Count: 2.90 K/uL (01-03 @ 22:14)  WBC Count: 3.18 K/uL (01-03 @ 18:08)  WBC Count: 4.60 K/uL (01-03 @ 12:11)  WBC Count: 4.93 K/uL (01-03 @ 06:34)    RECENT CULTURES:  Specimen Source: .Blood BLOOD  Date/Time: 01-02 @ 00:30  Culture Results:   No growth at 24 hours  Gram Stain: --  Organism: --  Specimen Source: .Blood BLOOD  Date/Time: 01-01 @ 01:23  Culture Results:   No growth at 24 hours  Gram Stain: --  Organism: --    Meds: caspofungin IVPB 50 milliGRAM(s) IV Intermittent every 24 hours  caspofungin IVPB      cycloSPORINE  , modified (GENGRAF) Solution 75 milliGRAM(s) Oral <User Schedule>  cycloSPORINE  , modified (GENGRAF) Solution 50 milliGRAM(s) Oral <User Schedule>  ganciclovir IVPB 250 milliGRAM(s) IV Intermittent every 24 hours  imipenem/cilastatin  IVPB 500 milliGRAM(s) IV Intermittent every 6 hours  metroNIDAZOLE  IVPB 500 milliGRAM(s) IV Intermittent every 8 hours  trimethoprim  40 mG/sulfamethoxazole 200 mG Suspension 80 milliGRAM(s) Enteral Tube daily        ENDOCRINE  CAPILLARY BLOOD GLUCOSE      POCT Blood Glucose.: 240 mg/dL (03 Jan 2025 23:14)  POCT Blood Glucose.: 240 mg/dL (03 Jan 2025 19:25)  POCT Blood Glucose.: 142 mg/dL (03 Jan 2025 06:18)    Meds: insulin lispro (ADMELOG) corrective regimen sliding scale   SubCutaneous every 6 hours  methylPREDNISolone sodium succinate Injectable 32 milliGRAM(s) IV Push <User Schedule>

## 2025-01-04 NOTE — PROGRESS NOTE ADULT - ASSESSMENT
74 male w/ a PMHx of HTN, DM, AF, BPH, MASH cirrhosis and HCC s/p OLT on 11/15. Case was uneventful but post-op course has been prolonged and c/b delirium, aspiration, multiple episodes of acute hypoxic respiratory failure requiring reintubation from 11/20-11/24 & 11/24-11/26, AF w/ RVR, ileus requiring NGT, distended colon requiring rectal tube, dysphagia, malnutrition, hypernatremia, fevers, pancytopenia, poorly controlled glucoses, and left brachial VTE. Patient went into severe refractory septic shock on 12/6 w/ blood cultures positive for E. coli s/p s/p ex lap, total abd colectomy, end ileostomy, 3 intentionally retained laparotomy pads for bleeding, Abthera, IABP placement w/ admission to CTICU, Patient required inotropes, Jacqui, and CRRT post-op. s/p closure 12/9. IABP removed 12/10 and transferred back to SICU 12/13. SICU course c/b narrow complex arrythmia w/ ECG showing new ST elevations concerning for posterior wall MI vs vasospasms, cards consulted and started on heparin gtt for empiric ACS tx which was c/b GIB then transitioned to argatroban c/b bleed. TTE revealed LVOT obstruction & TODD.     PLAN:  Neuro:  - Pain: PRN oxy solution  - Opens eyes intermittently, intermittently following commands, off sedation  - CT head 11/19, 11/21, 11/25, 11/29, 12/5 negative  - EEG: Mild diffuse cerebral dysfunction that is not specific in etiology. No epileptic discharges recorded. No seizures recorded.  - Holding home xanax, Abilify, Zoloft, Remeron, Lexapro  - CT head 12/20 showing age-indeterminate infarct, f/u Neurology recs  - No need for MRI     Resp:  - S/p bedside tracheostomy 12/17  - PRVC 14/450/5/30  - trach collar daily, full support overnight  - c/w inhaled bronchodilator    CV:  - Aldo, vaso  - 10mg midodrine q8  - home metoprolol 12.5 q8hr held for pressor req  - IABP removed 12/10  - TTE 12/6 w/ LVOT obstruction & TODD  - TTE 12/11 shows EF of 55-60%    GI:  - trend LFTs  - NPO, NGT (can be to gravity tube feeds or to gravity, no suction)   - lomotil 2 tabs QID, add immodium  - protonix BID  - monitor ALYSSA output    /Renal:  - albumin 500 q 6  - CRRT restarted 12/23  - Monitor BUN/Cr, I&Os, trend UOP  - Scrotal US 12/15 -> edema, no abscess -> elevate  - repeat scrotal doppler for concern of ischemia > low flow state, low concern for abscess  - Bladder scan q12    Heme:  - trend H/H, PLTs, coags  - hep gtt and argatroban gtt held i/s/o bleed  - s/p desmopression 30 x 1 on 12/26  - vitK 5 PO for 3 days (1/3-1/5)    ID:  - ABX: caspo, jeniffer, imepenem, flaygyl  - transplant ppx w/ bactrim  - Ganciclovir for CMV   - immunosuppression w/ cyclosporine  - 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

## 2025-01-05 NOTE — PROGRESS NOTE ADULT - ASSESSMENT
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   s/p OLT 11/15 with complicated post op course    [] Septic shock/Cardiogenic shock  [] s/p Colectomy 12/7   [] RTOR for closure and Ileostomy creation   [] IAPB 12/7- removed 12/10  -> E Coli Bacteremia 12/6  -> Ec faecalis UTI (12/16)  -> Stenotrophaonas in trach apirate (12/19)  -> Ec Faecalis in Asc fluid (12/20) (12/26)  -> Clostridium paraputrifucum in blood 12/23, cultures have since cleared  - Tx ID following - on Imipenem/Caspo/Flagyl/Bactrim DS   - 1/1 CT CAP: small pleural effusions, enteritis, rest ok  - all lines changed over 1/3  - Wound care for sacral decubitus ulcer    [] MOARIMA:   -CRRT started 12/7, stopped 12/15, resumed CRRT 12/23    [ ] UGIB s/p EGD showing generalized oozing, coagulopathy  - Correct coagulopathy per SICU  - Protonix BID  - TF at goal  - Vitamin K 3 days ends 1/5/25    [] s/p OLT  - poor graft function, trial of plasmapheresis 1/3 for (3-5 days)  - repeat liver US unchanged  - Diet: TFs to goal  - Pain management: avoid narcotics  - Strict I&Os, wound vac placed 12/10   - US: L brachial DVT (holding A/C)  - wound vac care  - ursodiol 300mgBID     [ ] Immunosuppression  - Tacrolimus stopped 11/18 in setting of AMS/Seizure, Started Cyclo 12/17  - Cyclo per level, MMF HELD, Solumedrol 32 mg daily    [] lleus -- resolving  -@ home on Linzess  - imaging with distended colon, improving, (likely opioid induced)  - s/p Neostigmine x 2  - s/p Relistor, last dose 12/1  - s/p colectomy with end ileostomy  (see above)  - ileostomy with >1L output, lomotil and imodium as needed, and change tube feeds  - Colorectal following  - replace losses as able    [] CMV viremia  - CMV PCR (12/11 and 12/16): neg, positive CMV PCR on 12/23  - PPx: Gancyclovir (HELD) in setting of thrombocytopenia initially due to low PLT  - CMV viral load 537 12/31, will attempt Ganciclovir again, next check 1/7    [ ] AMS  - Reintubated 11/20 Aspiration/hypoxia, extubated 11/24->[id number]/24--> extubated 11/26 -> [id number] -> tracheostomy 12/17 -> trach collar trials starting 12/29  - EEG 11/30 negative, Neurology following  - BH following   - off tacro  - on keppra  -CT Head No Cont (12/20): Age-indeterminate posterior left frontal lobe infarct.   -CT Head (12/25): Evolving subacute infarct in the left supramarginal gyrus  -repeat CTH ok, poor MS    [ ] HTN/ pAFib  [ ] coronary vasospasm vs posterior wall MI  - remains off all a/c, failed hep gtt/argatroban due to UGIB  - Cards- plan for PCI prior to DC   - Off Amiodarone, off dobutamine  - BB as needed  - Dr. 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   s/p OLT 11/15 with complicated post op course    [] Septic shock/Cardiogenic shock  [] s/p Colectomy 12/7   [] RTOR for closure and Ileostomy creation   [] IAPB 12/7- removed 12/10  -> E Coli Bacteremia 12/6  -> Ec faecalis UTI (12/16)  -> Stenotrophaonas in trach apirate (12/19)  -> Ec Faecalis in Asc fluid (12/20) (12/26)  -> Clostridium paraputrifucum in blood 12/23, cultures have since cleared  - Tx ID following - on Imipenem/Caspo/Flagyl/Bactrim DS   - 1/1 CT CAP: small pleural effusions, enteritis, rest ok  - all lines changed over 1/3  - Wound care for sacral decubitus ulcer    [] MORAIMA:   -CRRT started 12/7, stopped 12/15, resumed CRRT 12/23    [ ] UGIB s/p EGD (12/26) showing generalized oozing, coagulopathy  - Correct coagulopathy per SICU  - Protonix BID  - TF at goal  - Vitamin K 3 days ends 1/5/25    [] s/p OLT  - poor graft function, trial of plasmapheresis 1/3 for (3-5 days)  - repeat liver US unchanged  - Diet: TFs to goal  - Pain management: avoid narcotics  - Strict I&Os, wound vac placed 12/10   - US: L brachial DVT (holding A/C)  - wound vac care  - ursodiol 300mgBID     [ ] Immunosuppression  - Tacrolimus stopped 11/18 in setting of AMS/Seizure, Started Cyclo 12/17  - Cyclo per level, MMF HELD, Solumedrol 32 mg daily    [] lleus -- resolving  -@ home on Linzess  - imaging with distended colon, improving, (likely opioid induced)  - s/p Neostigmine x 2  - s/p Relistor, last dose 12/1  - s/p colectomy with end ileostomy  (see above)  - ileostomy with >1L output, lomotil and imodium as needed, and change tube feeds  - Colorectal following  - replace losses as able    [] CMV viremia  - CMV PCR (12/11 and 12/16): neg, positive CMV PCR on 12/23  - PPx: Gancyclovir (HELD) in setting of thrombocytopenia initially due to low PLT  - CMV viral load 537 12/31, will attempt Ganciclovir again, next check 1/7    [ ] AMS  - Reintubated 11/20 Aspiration/hypoxia, extubated 11/24->[id number]/24--> extubated 11/26 -> [id number] -> tracheostomy 12/17 -> trach collar trials starting 12/29  - EEG 11/30 negative, Neurology following  - BH following   - off tacro  - on keppra  -CT Head No Cont (12/20): Age-indeterminate posterior left frontal lobe infarct.   -CT Head (12/25): Evolving subacute infarct in the left supramarginal gyrus  -repeat CTH ok, poor MS    [ ] HTN/ pAFib  [ ] coronary vasospasm vs posterior wall MI  - remains off all a/c, failed hep gtt/argatroban due to UGIB  - Cards- plan for PCI prior to DC   - Off Amiodarone, off dobutamine  - BB as needed  - Dr. Quintanilla following    [ ] DM  - per SICU     [] Scrotal abscess   - US (12/12): 2.1 x0.9 x 3.2 cm focal, right testicle- possible abscess (12/12)  - Urology recs: CT scrotum review no debridement required  - Urology recs Derm consult for guidance on wound care   - 12/23 US doppler scrotum: no arterial flow, limited venous flow, bl hydrocele  - 12/15 CT CAP no acute changes   - Elevate scrotum w/ support Urology signed off 12/29

## 2025-01-05 NOTE — PROGRESS NOTE ADULT - ASSESSMENT
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 Post op course complicated by acute hypoxic respiratory failure requiring intubation multiple times, most recently on 12/7 with conversion to tracheostomy on 12/17, e.coli bacteremia with RTOR on 12/7 for concern for worsening septic shock and cardiogenic shock s/p balloon pump placement and total abdominal colectomy in setting of ischemic bowel s/p OR on 12/9 for ileostomy creation, and olirugirc MORAIMA requiring CRRT and now intermittent HD.    Diagnosed with pneumonia secondary to Stenotrophomonas    Also with growth of Enterococcus faecalis from abdominal drains and urine culture    More fever and shock event on 12/29/24 likely 2/2 GIB    UA (12/19) 2 WBC  BCx (12/23) Clostridium paraputrificum  Bronch Cx (12/23) NGTD    CT Chest (12/23) Bibasilar groundglass and tree in bud opacities which may represent distal airway impaction versus pneumonia.    CT A/P (12/23) Peripheral wedge-shaped areas of hypoattenuation involving the superior aspect of the spleen, suggestive of splenic infarcts (12-59). Mildly dilated loops of proximal small bowel without transition point, likely ileus.    Testicular US (12/23) No appreciable arterial flow with very limited venous flow in in the testes bilaterally. Interval decrease in diffuse scrotal edema. Small bilateral hydroceles.    CT Pelvis (12/25) Moderate diffuse subcutaneous/scrotal edema without defined collection or subcutaneous air.    BCX (12/28): Gram variable rods (Blood PCR neg)    Antibiotic Course:  Meropenem ( 11/22-11/29, 11/22-11/29, 12/16-)   Minocycline (12/21-1/1)  Bactrim (11/16-11/24, 12/8-12/11, 12/21-)  Fluconazole (11/16-12/2, 12/12-12/16)   Caspofungin ( 12/6-12/11, 12/17-)  Cefepime (12/10-12/16)   Metronidazole (12/10-12/14)   Ganciclovir (12/7-12/13)   Linezolid (11/23-11/26, 12/6-12/11, 12/28-12/29)   Atovaquone (11/29-12/6)   Zosyn (11/20-11/22,12/6)   Tobramycin (12/6)   Valganciclovir (11/6-12/4)    #Positive Blood Culture (12/23 Clostridium paraputrificum) (12/28: Gram variable rods -Blood PCR neg)  Clostridium paraputrificum is generally penicillin and metronidazole susceptible      #Leukocytosis, Fever, Shock, Transaminitis,   #Stenotrophomonas tracheo/Pneumonia? s/p minocycline course  # polymicrobial bacteremia E faecalis; Clostridium spp in c/o Gi pathology but also necrotic scrotum     --If further worsening shock overnight would repeat blood cultures and administer Tobramycin 600 mg IV x1 (7 mg/kg based on adjusted body weight).   --CT C/A/P showing Small bilateral pleural effusions with associated compressive atelectasis, increased since prior exam. Mild distention and mural thickening of small bowel of concern for enteritis. Status post liver transplant and colectomy with stable postsurgical changes.  --s/p 10 day course of Minocycline 200 mg IV Q12H (12/21 >1/1) for Stenorophomnas   --Continue flagyl 500mg Q8, and  Imipenem 500mg Q6  --remains on Caspofungin;   -Decrease immunosuppression if possible   -- now with hypotension- agree with repeat BCx2, also tracheal aspirate cultures   -- at risk of VRE from antimicrobial exposures-     #Liver Transplant Recipient, Prophylactic Antibiotic  --Repeat CMV detectable on 12/31  --Given thrombocytopenia can consider holding Bactrim or now  Cytomegalovirus By PCR: Det <34.5 IU/mL (12.23.24 @ 06:15)  Cytomegalovirus By PCR: 537 IU/mL (12.31.24 @ 13:36)  --restarted on induction therapy with ganciclovir IV adjusted for CVVH dosing  check CMV PCR monday      Thank you for involving us in the care of this patient  Transplant ID will continue to follow  Please call or page with additional questions  Pager; #[phone]  Teams: from 8 am to 5 pm  Rachele Lewis MD     74 year old male with PMH of DM, HTN, pAfib s/p ablation 2018 (no AC 2/2 thrombocytopenia), CAD, depression, anxiety, BPH, 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 Post op course complicated by acute hypoxic respiratory failure requiring intubation multiple times, most recently on 12/7 with conversion to tracheostomy on 12/17, e.coli bacteremia with RTOR on 12/7 for concern for worsening septic shock and cardiogenic shock s/p balloon pump placement and total abdominal colectomy in setting of ischemic bowel s/p OR on 12/9 for ileostomy creation, and olirugirc MORAIMA requiring CRRT and now intermittent HD.    Diagnosed with pneumonia secondary to Stenotrophomonas    Also with growth of Enterococcus faecalis from abdominal drains and urine culture    More fever and shock event on 12/29/24 likely 2/2 GIB    UA (12/19) 2 WBC  BCx (12/23) Clostridium paraputrificum  Bronch Cx (12/23) NGTD    CT Chest (12/23) Bibasilar groundglass and tree in bud opacities which may represent distal airway impaction versus pneumonia.    CT A/P (12/23) Peripheral wedge-shaped areas of hypoattenuation involving the superior aspect of the spleen, suggestive of splenic infarcts (12-59). Mildly dilated loops of proximal small bowel without transition point, likely ileus.    Testicular US (12/23) No appreciable arterial flow with very limited venous flow in in the testes bilaterally. Interval decrease in diffuse scrotal edema. Small bilateral hydroceles.    CT Pelvis (12/25) Moderate diffuse subcutaneous/scrotal edema without defined collection or subcutaneous air.    BCX (12/28): Gram variable rods (Blood PCR neg)    Antibiotic Course:  Meropenem ( 11/22-11/29, 11/22-11/29, 12/16-)   Minocycline (12/21-1/1)  Bactrim (11/16-11/24, 12/8-12/11, 12/21-)  Fluconazole (11/16-12/2, 12/12-12/16)   Caspofungin ( 12/6-12/11, 12/17-)  Cefepime (12/10-12/16)   Metronidazole (12/10-12/14)   Ganciclovir (12/7-12/13)   Linezolid (11/23-11/26, 12/6-12/11, 12/28-12/29)   Atovaquone (11/29-12/6)   Zosyn (11/20-11/22,12/6)   Tobramycin (12/6)   Valganciclovir (11/6-12/4)    #Positive Blood Culture (12/23 Clostridium paraputrificum) (12/28: Gram variable rods -Blood PCR neg)  Clostridium paraputrificum is generally penicillin and metronidazole susceptible      #Leukocytosis, Fever, Shock, Transaminitis,   #Stenotrophomonas tracheo/Pneumonia? s/p minocycline course  # polymicrobial bacteremia E faecalis; Clostridium spp in c/o Gi pathology but also necrotic scrotum     --If further worsening shock overnight would repeat blood cultures and administer Tobramycin 600 mg IV x1 (7 mg/kg based on adjusted body weight).   --CT C/A/P showing Small bilateral pleural effusions with associated compressive atelectasis, increased since prior exam. Mild distention and mural thickening of small bowel of concern for enteritis. Status post liver transplant and colectomy with stable postsurgical changes.  --s/p 10 day course of Minocycline 200 mg IV Q12H (12/21 >1/1) for Stenorophomnas   --Continue flagyl 500mg Q8, and  Imipenem 500mg Q6  --remains on Caspofungin;   -Decrease immunosuppression if possible   -- now with hypotension- agree with repeat BCx2, also send  tracheal aspirate cultures   -- at risk of VRE from antimicrobial exposures- add linezolid to expand coverage  -- discussed with team and given hemodynamic changes, will also add tobramycin 7 mg/kg x1 - pending repeat BC  - will need level tomorrow after 12-14 hours since trbramycin dose    #Liver Transplant Recipient, Prophylactic Antibiotic  --Repeat CMV detectable on 12/31  --Given thrombocytopenia can consider holding Bactrim or now  Cytomegalovirus By PCR: Det <34.5 IU/mL (12.23.24 @ 06:15)  Cytomegalovirus By PCR: 537 IU/mL (12.31.24 @ 13:36)  --restarted on induction therapy with ganciclovir IV adjusted for CVVH dosing  check CMV PCR monday      Thank you for involving us in the care of this patient  Transplant ID will continue to follow  Please call or page with additional questions  Pager; #[phone]  Teams: from 8 am to 5 pm  Rachele Lewis MD

## 2025-01-05 NOTE — PROGRESS NOTE ADULT - ASSESSMENT
73 year old man with GONZALES cirrhosis and HCC, HTN, DM, AF, BPH  Underwent liver transplant on  11/15/24. Post-op course complicated by  septic shock in the setting of ischemic bowel s/p total colectomy and ileostomy creation 12/9/24  Has poor mental status, acute hypoxic respiratory failure requiring multiple intubations/extubations, eventually had tracheostomy on 12/17/24 remains on vent.   Course further complicated by recurrent infections:  Candida glabrata in bronchial asp (11/24), E. coli Bacteremia (12/6), high fungitell 188 (12/14), E.Faecalis UTI (12/16), Stenotrophomonas in trach aspirate (12/19), clostridium paraputrificum bacteremia 12/23  On Caspofungin, metronidazole and imipenem.  Completed 10 day course of minocycline 12/21-1/1    Oliguric MORAIMA s/p OLT in the setting of septic shock and ischemic colitis requiring CRRT since 12/7/24   Remains oligoanuric  Back on phenyleprine   s/p Plex #2  yesterday for hyhperbilli/AMS   Continue CRRT net even   OLT on cyclosporine and Methylpred, ppx Ganciclovir, Bactrim .   73 year old man with GONZALES cirrhosis and HCC, HTN, DM, AF, BPH  Underwent liver transplant on  11/15/24. Post-op course complicated by  septic shock in the setting of ischemic bowel s/p total colectomy and ileostomy creation 12/9/24  Has poor mental status, acute hypoxic respiratory failure requiring multiple intubations/extubations, eventually had tracheostomy on 12/17/24 remains on vent.   Course further complicated by recurrent infections:  Candida glabrata in bronchial asp (11/24), E. coli Bacteremia (12/6), high fungitell 188 (12/14), E.Faecalis UTI (12/16), Stenotrophomonas in trach aspirate (12/19), clostridium paraputrificum bacteremia 12/23  On Caspofungin, metronidazole and imipenem.  Completed 10 day course of minocycline 12/21-1/1    Oliguric MORAIMA s/p OLT in the setting of septic shock and ischemic colitis requiring CRRT since 12/7/24   Remains oligoanuric  Back on phenyleprine   s/p Plex #2  yesterday for hyhperbilli/AMS   Continue CRRT net even , will change dialysate to 4K for hypokalemia   OLT on cyclosporine and Methylpred, ppx Ganciclovir, Bactrim .

## 2025-01-05 NOTE — CHART NOTE - [facility] PATIENT INTERVIEW PRIVATE SPACE
Patient interviewed in a private space.
Patient interviewed in a private space.
Rotation Flap Text: The defect edges were debeveled with a #15 scalpel blade.  Given the location of the defect, shape of the defect and the proximity to free margins a rotation flap was deemed most appropriate.  Using a sterile surgical marker, an appropriate rotation flap was drawn incorporating the defect and placing the expected incisions within the relaxed skin tension lines where possible.    The area thus outlined was incised deep to adipose tissue with a #15 scalpel blade.  The skin margins were undermined to an appropriate distance in all directions utilizing iris scissors.

## 2025-01-05 NOTE — PROGRESS NOTE ADULT - ASSESSMENT
74 male w/ a PMHx of HTN, DM, AF, BPH, MASH cirrhosis and HCC s/p OLT on 11/15. Case was uneventful but post-op course has been prolonged and c/b delirium, aspiration, multiple episodes of acute hypoxic respiratory failure requiring reintubation from 11/20-11/24 & 11/24-11/26, AF w/ RVR, ileus requiring NGT, distended colon requiring rectal tube, dysphagia, malnutrition, hypernatremia, fevers, pancytopenia, poorly controlled glucoses, and left brachial VTE. Patient went into severe refractory septic shock on 12/6 w/ blood cultures positive for E. coli s/p s/p ex lap, total abd colectomy, end ileostomy, 3 intentionally retained laparotomy pads for bleeding, Abthera, IABP placement w/ admission to CTICU, Patient required inotropes, Jacqui, and CRRT post-op. s/p closure 12/9. IABP removed 12/10 and transferred back to SICU 12/13. SICU course c/b narrow complex arrythmia w/ ECG showing new ST elevations concerning for posterior wall MI vs vasospasms, cards consulted and started on heparin gtt for empiric ACS tx which was c/b GIB then transitioned to argatroban c/b bleed. TTE revealed LVOT obstruction & TODD.     PLAN:  Neuro:  - Pain: PRN oxy solution  - Opens eyes intermittently, intermittently following commands, off sedation  - CT head 11/19, 11/21, 11/25, 11/29, 12/5 negative  - EEG: Mild diffuse cerebral dysfunction that is not specific in etiology. No epileptic discharges recorded. No seizures recorded.  - Holding home xanax, Abilify, Zoloft, Remeron, Lexapro  - CT head 12/20 showing age-indeterminate infarct, f/u Neurology recs  - No need for MRI     Resp:  - S/p bedside tracheostomy 12/17  - PRVC 14/450/5/30  - trach collar daily, full support overnight  - c/w inhaled bronchodilator    CV:  - Aldo, vaso  - 10mg midodrine q8  - home metoprolol 25 BID  - IABP removed 12/10  - TTE 12/6 w/ LVOT obstruction & TODD  - TTE 12/11 shows EF of 55-60%    GI:  - trend LFTs  - NPO, NGT (can be to gravity tube feeds or to gravity, no suction)   - lomotil 2 tabs QID,   - immodium  - protonix BID  - monitor ALYSSA output    /Renal:  - CRRT held 1/4 w plans for possible diuretic challenge  - Monitor BUN/Cr, I&Os, trend UOP  - Monitor scrotal necrosis, maintain nichols at all times  - Bladder scan q12    Heme:  - trend H/H, PLTs, coags  - hep gtt and argatroban gtt held i/s/o bleed  - s/p desmopression 30 x 1 on 12/26  - vitK 5 PO for 3 days (1/3-1/5)  - PLEX started 1/3    ID:  - ABX: caspo, jeniffer, flagyl, imepenem, flagyl  - transplant ppx w/ bactrim  - Ganciclovir for CMV   - immunosuppression w/ cyclosporine  - 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

## 2025-01-05 NOTE — PROGRESS NOTE ADULT - SUBJECTIVE AND OBJECTIVE BOX
[facility] DIVISION OF KIDNEY DISEASES AND HYPERTENSION -- FOLLOW UP NOTE  --------------------------------------------------------------------------------  Authored by: Terra Christie   Cell # [phone]     STERLING BRYANT was seen and examined at bedside.   Patient is a 74y old  Male who presents with a chief complaint of Liver Transplant (01-05-25)      24 hour events/subjective: Plex #2 done yesterday, hypotension back on phenylephrine, remains anuric, CRRT resumed, net even.       Standing Inpatient Medications  buDESOnide    Inhalation Suspension 0.5 milliGRAM(s) Inhalation every 12 hours  caspofungin IVPB 50 milliGRAM(s) IV Intermittent every 24 hours  cycloSPORINE  , modified (GENGRAF) Solution 75 milliGRAM(s) Oral <User Schedule>  cycloSPORINE  , modified (GENGRAF) Solution 50 milliGRAM(s) Oral <User Schedule>  diphenoxylate/atropine 2 Tablet(s) Oral every 6 hours  ganciclovir IVPB 250 milliGRAM(s) IV Intermittent every 24 hours  imipenem/cilastatin  IVPB 500 milliGRAM(s) IV Intermittent every 6 hours  insulin NPH human recombinant 6 Unit(s) SubCutaneous every 6 hours  insulin regular Infusion 4 Unit(s)/Hr IV Continuous <Continuous>  loperamide Liquid 2 milliGRAM(s) Enteral Tube every 6 hours  methylPREDNISolone sodium succinate Injectable 32 milliGRAM(s) IV Push <User Schedule>  metoprolol tartrate 25 milliGRAM(s) Oral every 12 hours  metroNIDAZOLE  IVPB 500 milliGRAM(s) IV Intermittent every 8 hours  midodrine 10 milliGRAM(s) Oral every 8 hours  mupirocin 2% Ointment 1 Application(s) Topical every 12 hours  nystatin Powder 1 Application(s) Topical every 12 hours  pantoprazole  Injectable 40 milliGRAM(s) IV Push every 12 hours  phenylephrine    Infusion 0.1 MICROgram(s)/kG/Min IV Continuous <Continuous>  phytonadione   Solution 5 milliGRAM(s) Enteral Tube every 24 hours  potassium chloride  10 mEq/100 mL IVPB 10 milliEquivalent(s) IV Intermittent every 1 hour  trimethoprim  40 mG/sulfamethoxazole 200 mG Suspension 80 milliGRAM(s) Enteral Tube daily  ursodiol Suspension 300 milliGRAM(s) Oral every 12 hours    PRN Inpatient Medications  albuterol/ipratropium for Nebulization 3 milliLiter(s) Nebulizer every 6 hours PRN  FIRST- Mouthwash  BLM 10 milliLiter(s) Swish and Spit every 8 hours PRN  oxyCODONE    Solution 5 milliGRAM(s) Oral every 4 hours PRN  oxyCODONE    Solution 2.5 milliGRAM(s) Oral every 4 hours PRN        VITALS/PHYSICAL EXAM  --------------------------------------------------------------------------------  T(C): 37.5 (01-05-25 @ 07:00), Max: 37.6 (01-05-25 @ 03:00)  HR: 104 (01-05-25 @ 08:00) (84 - 130)  BP: 102/54 (01-05-25 @ 08:00) (79/42 - 172/76)  RR: 24 (01-05-25 @ 08:00) (7 - 38)  SpO2: 98% (01-05-25 @ 08:00) (91% - 99%)      01-04-25 @ 07:01  -  01-05-25 @ 07:00  --------------------------------------------------------  IN: 4335.8 mL / OUT: 3134 mL / NET: 1201.8 mL    01-05-25 @ 07:01  -  01-05-25 @ 08:43  --------------------------------------------------------  IN: 172.4 mL / OUT: 241 mL / NET: -68.6 mL      Physical Exam:  Eyes open but not responsive   On vent via trach   Left IJ temporary dialysis catheter   NGT +  Icterus skin and sclera+  Abdomen - vac dressing, ileostomy  Castro in place minimal UOP   B/l pitting LE edema  superficial skin ulcerations b/l things and necrotic appearing scrotal skin       LABS/STUDIES  --------------------------------------------------------------------------------              8.0    4.77  >-----------<  42       [01-05-25 @ 05:46]              24.2     143  |  106  |  49  ----------------------------<  157      [01-05-25 @ 05:45]  3.1   |  17  |  0.51        Ca     9.6     [01-05-25 @ 05:45]      Mg     2.2     [01-05-25 @ 05:45]      Phos  3.0     [01-05-25 @ 05:45]    TPro  4.6  /  Alb  3.3  /  TBili  16.4  /  DBili  x   /  AST  54  /  ALT  111  /  AlkPhos  84  [01-05-25 @ 05:45]    PT/INR: PT 19.9 , INR 1.76       [01-05-25 @ 05:47]  PTT: 35.9       [01-05-25 @ 05:47]      Creatinine Trend:  SCr 0.51 [01-05 @ 05:45]  SCr 0.62 [01-04 @ 23:13]  SCr 0.55 [01-04 @ 17:47]  SCr 0.54 [01-04 @ 12:07]  SCr 0.43 [01-04 @ 06:56]      CAPILLARY BLOOD GLUCOSE  POCT Blood Glucose.: 99 mg/dL (05 Jan 2025 08:02)  POCT Blood Glucose.: 122 mg/dL (05 Jan 2025 07:09)  POCT Blood Glucose.: 135 mg/dL (05 Jan 2025 06:10)  POCT Blood Glucose.: 166 mg/dL (05 Jan 2025 05:05)  POCT Blood Glucose.: 201 mg/dL (05 Jan 2025 04:07)  POCT Blood Glucose.: 228 mg/dL (05 Jan 2025 03:02)

## 2025-01-05 NOTE — PROGRESS NOTE ADULT - SUBJECTIVE AND OBJECTIVE BOX
Transplant ID follow up     Afebrile  Repeat Cx NGTD  off pressors  awake, tracking but encephalopathic  hypotension overnight- given albumin on phenylephrine  Repeat BC sent        Vital Signs Last 24 Hrs  T(C): 37.3 (2025 11:00), Max: 37.6 (2025 03:00)  T(F): 99.1 (2025 11:00), Max: 99.7 (2025 03:00)  HR: 94 (2025 13:45) (84 - 130)  BP: 129/61 (2025 13:45) (79/42 - 172/76)  BP(mean): 88 (2025 13:45) (54 - 113)  RR: 20 (2025 13:45) (7 - 38)  SpO2: 98% (2025 13:45) (85% - 100%)    Parameters below as of 2025 07:00  Patient On (Oxygen Delivery Method): ventilator          EXAM:  General: Patient in no acute distress, intubated   CV: S1+S2, no m/r/g appreciated   Lungs: No respiratory distress, CTAB  Abd: Soft, no guarding, no rebound tenderness,  wound vac in place  distended  Ext: No cyanosis, no edema  Neuro: Intubated   Skin: No rash   IV: No phlebitis  scrotal necrosis            ____________________________________________________  ROS  GENERAL: denies chills, , night sweats, weight loss.   PSYCH: denies depression, anxiety, suicidal ideation, hallucination, and delusions  SKIN: no rash or lesions; no color changes, no abnormal nevi,no  dryness, and nojaundice    EYES: denies visual changes, floaters, pain, inflammation, blurred vision, and discharge  ENT: denies tinnitus, vertigo, epistaxis, oral lesion, and decreased acuity  PULM: denies, hemoptysis, pleurisy  CVS: denies angina, palpitations,+ orthopnea, no syncope, or heart murmur  GI: denies constipation, diarrhea, melena, abdominal pain, nausea.   : denies dysuria, frequency, discharge, incontinence, stones or macroscopic hematuria  MS: no arthralgias, no erythema or swelling, no myalgias, noedema, or lower back pain.   CNS: denies numbness, dizziness, seizure, or tremor  ENDO: denies heat/cold intolerance, polyuria, polydipsia, malaise.    HEME: denies bruising, bleeding, lymphadenopathy, anemia, and calf pain    Allergies  No Known Allergies    __________________________________________________  MEDS:  MEDICATIONS  (STANDING):  albuterol/ipratropium for Nebulization 3 every 6 hours PRN  buDESOnide    Inhalation Suspension 0.5 every 12 hours  cycloSPORINE  , modified (GENGRAF) Solution 75 <User Schedule>  cycloSPORINE  , modified (GENGRAF) Solution 50 <User Schedule>  diphenoxylate/atropine 2 every 6 hours  insulin lispro (ADMELOG) corrective regimen sliding scale  every 4 hours  loperamide Liquid 2 every 6 hours  methylPREDNISolone sodium succinate Injectable 32 <User Schedule>  metoprolol tartrate 25 every 12 hours  midodrine 10 every 8 hours  oxyCODONE    Solution 5 every 4 hours PRN  oxyCODONE    Solution 2.5 every 4 hours PRN  pantoprazole  Injectable 40 every 12 hours  phenylephrine    Infusion 0.1 <Continuous>  ursodiol Suspension 300 every 12 hours    _________________________________________________  ANTIMICOBIALS  caspofungin IVPB 50 every 24 hours  caspofungin IVPB    cycloSPORINE  , modified (GENGRAF) Solution 75 <User Schedule>  cycloSPORINE  , modified (GENGRAF) Solution 50 <User Schedule>  ganciclovir IVPB 250 every 24 hours  imipenem/cilastatin  IVPB 500 every 6 hours  metroNIDAZOLE  IVPB 500 every 8 hours  trimethoprim  40 mG/sulfamethoxazole 200 mG Suspension 80 daily      GENERAL LABS              8.9                  141  | 21   | 48           5.12  >-----------< 22      ------------------------< 119                   26.1                 4.0  | 107  | 0.49                                         Ca 8.5   Mg 2.1   Ph 2.8      Vancomycin Level, Random: <4.0 ( @ 06:19)  Vancomycin Level, Random: 9.0 ( @ 05:52)  Vancomycin Level, Random: 15.0 ( @ 06:24)  Vancomycin Level, Random: 9.3 ( @ 06:10)  Vancomycin Level, Random: 16.6 ( @ 06:25)  Vancomycin Level, Random: 16.3 ( @ 06:15)  Vancomycin Level, Random: 11.4 ( @ 06:17)  Vancomycin Level, Random: 4.4 ( @ 06:30)  Vancomycin Level, Random: 8.5 ( @ 06:25)    Urinalysis Basic - ( 2025 12:07 )    Color: x / Appearance: x / SG: x / pH: x  Gluc: 119 mg/dL / Ketone: x  / Bili: x / Urobili: x   Blood: x / Protein: x / Nitrite: x   Leuk Esterase: x / RBC: x / WBC x   Sq Epi: x / Non Sq Epi: x / Bacteria: x        _________________________________________________  MICROBIOLOGY  -----------    Culture - Blood (collected 2025 00:30)  Source: .Blood BLOOD  Preliminary Report (2025 05:01):    No growth at 72 Hours    Culture - Blood (collected 2025 01:23)  Source: .Blood BLOOD  Preliminary Report (2025 05:01):    No growth at 72 Hours            CMVPCR Lo.73 Guh33WN/mL ( @ 13:36)    Clostridium difficile GDH Toxins A&amp;B, EIA:   Negative (25 @ 12:59)  Clostridium difficile GDH Interpretation: Negative for toxigenic C. Difficile.  This specimen is negative for C.  Difficile glutamate dehydrogenase (GDH) antigen and negative for C.  Difficile Toxins A & B, by EIA.  GDH is a highly sensitive screening  marker for C. Difficile that is produced in large amounts by all C.  Difficile strains, both toxigenic and nontoxigenic.  This assay has not  been validated as a test of cure.  Repeat testing during the same episode  of diarrhea is of limited value and is discouraged.  The results of this  assay should always be interpreted in conjunction with patient's clinical  history. (25 @ 12:59)        Fungitell:   _______________________________________________  PERTINENT IMAGING

## 2025-01-05 NOTE — PROGRESS NOTE ADULT - SUBJECTIVE AND OBJECTIVE BOX
SURGERY DAILY PROGRESS NOTE    24 Hour/Overnight Events:   - tolerating trach collar  - PLEX 2nd session  - holding CRRT w possible diuretic challenge  - 1 plt for TEG correction  - started NPH 6 U q 6   - Bumex 2mg x1  - Afib w RVR > metop 5 IV  - insulin gtt started for -400s  - hypotensive to SBP 90s, given 250 albumin given high output from drains and ileostomy  - another 250 albumin   - TEG corrected w 1 plt and 1 cryo    SUBJECTIVE: Patient seen and evaluated on AM rounds.   ------------------------------------------------------------------------------------------------------------  OBJECTIVE:  Vital Signs Last 24 Hrs  T(C): 37.5 (05 Jan 2025 07:00), Max: 37.6 (05 Jan 2025 03:00)  T(F): 99.5 (05 Jan 2025 07:00), Max: 99.7 (05 Jan 2025 03:00)  HR: 105 (05 Jan 2025 09:45) (84 - 130)  BP: 129/58 (05 Jan 2025 09:45) (79/42 - 172/76)  BP(mean): 84 (05 Jan 2025 09:45) (54 - 113)  RR: 22 (05 Jan 2025 09:45) (7 - 38)  SpO2: 98% (05 Jan 2025 09:45) (88% - 100%)    Parameters below as of 05 Jan 2025 07:00  Patient On (Oxygen Delivery Method): ventilator      I&O's Detail    04 Jan 2025 07:01  -  05 Jan 2025 07:00  --------------------------------------------------------  IN:    Albumin 5%  - 500 mL: 750 mL    Cryoprecipitate: 75 mL    Enteral Tube Flush: 190 mL    Insulin: 61 mL    IV PiggyBack: 600 mL    IV PiggyBack: 250 mL    IV PiggyBack: 600 mL    Phenylephrine: 59.8 mL    Platelets - Single Donor: 450 mL    Vital1.5: 1300 mL  Total IN: 4335.8 mL    OUT:    Bulb (mL): 1175 mL    Bulb (mL): 5 mL    Ileostomy (mL): 1600 mL    Indwelling Catheter - Urethral (mL): 12 mL    Other (mL): 342 mL    VAC (Vacuum Assisted Closure) System (mL): 0 mL  Total OUT: 3134 mL    Total NET: 1201.8 mL      05 Jan 2025 07:01  -  05 Jan 2025 09:55  --------------------------------------------------------  IN:    Enteral Tube Flush: 50 mL    IV PiggyBack: 200 mL    IV PiggyBack: 100 mL    Phenylephrine: 49.1 mL  Total IN: 399.1 mL    OUT:    Ileostomy (mL): 200 mL    Indwelling Catheter - Urethral (mL): 0 mL    Insulin: 0 mL    Other (mL): 41 mL    Vital1.5: 0 mL  Total OUT: 241 mL    Total NET: 158.1 mL          LABS:                        8.0    4.77  )-----------( 42       ( 05 Jan 2025 05:46 )             24.2     01-05    143  |  106  |  49[H]  ----------------------------<  157[H]  3.1[L]   |  17[L]  |  0.51    Ca    9.6      05 Jan 2025 05:45  Phos  3.0     01-05  Mg     2.2     01-05    TPro  4.6[L]  /  Alb  3.3  /  TBili  16.4[H]  /  DBili  x   /  AST  54[H]  /  ALT  111[H]  /  AlkPhos  84  01-05    LIVER FUNCTIONS - ( 05 Jan 2025 05:45 )  Alb: 3.3 g/dL / Pro: 4.6 g/dL / ALK PHOS: 84 U/L / ALT: 111 U/L / AST: 54 U/L / GGT: x           PT/INR - ( 05 Jan 2025 05:47 )   PT: 19.9 sec;   INR: 1.76 ratio         PTT - ( 05 Jan 2025 05:47 )  PTT:35.9 sec  Urinalysis Basic - ( 05 Jan 2025 05:45 )    Color: x / Appearance: x / SG: x / pH: x  Gluc: 157 mg/dL / Ketone: x  / Bili: x / Urobili: x   Blood: x / Protein: x / Nitrite: x   Leuk Esterase: x / RBC: x / WBC x   Sq Epi: x / Non Sq Epi: x / Bacteria: x            PHYSICAL EXAM:  General: Not on sedation, not following commands, frail appearing  Pulmonary: On trach collar  Cardiovascular: borderline hypotension on pressor support  Abdominal: soft, ostomy pink and viable with blood-tinged liquid output

## 2025-01-05 NOTE — PROGRESS NOTE ADULT - ATTENDING COMMENTS
SICU ATTENDING ATTESTATION    I have seen and examined this patient on multidisciplinary SICU rounds thismorning. I have reviewed all new labs, imaging and reports. I have participated in formulating the plan for the day, and have read and agree with the history, ROS, exam, assessment and plan as stated above, with my additions listed below:     Persistently poor mental status.   CXR shows worsening bilateral effusions vs. atalectasis --> has been doing trach collar during the day, will keep on PRVC with PEEP 7 today/overnight for recruitment.   Overnight had an increase in lactate to 6.7 and is now on Neosynephrine again. Unclear if septic or related to volume, as lactate improved with infusion of albumin.   TF were held due to concern for abdominal distension, however on my exam he is soft and unchanged from prior. Restarting TF.   Ostomy output is better today (1.6L from 3+L). Continue dose of Lomotil and Immodium, which may continue to work over the course of the next few days. Giving alb 250 now for goal net even today.   TEG was corrected overnight, though no evidence of active bleeding. TEGS only prn now if evidence of bleeding. Plt 22, but trend down very quickly after transfusion 35 -- 1unit plt --> 42 --> 22... Will only give if bleeding or plt ~15.  Planned for 3rd session of ffp-plasmaphoresis today (aimed to tbili clearance and coagulation correction). AST/ALT/tbili have continued to slowly worsen despite plasmorphoresis, and his coagulopathy has not improved. Followig third session, labs can be reviewed and family can discuss liver viability with the transplant team, though I did discuss with him today that liver function is continuing to worsen despite plasmophoresis.   Was hyperglycemic yesterday, started on NPH which was escalated to an insulin gtt overnight --> stopped today for hypoglycemia of 80's, likely resultant after holding TF. Will monitor off ins gtt.   Patient's son has joined us on rounds today.    The patient required critical care. Critical care time was indicated due to the patient's inherent instability and high risk for decompensation. E & M work was done by me in multiple 10-15 minute intervals over the course of the day in the ICU. I have coordinated care with multiple teams, and reviewed the medical record, consult notes, ICU flow sheets, cardiac monitoring, mechanical ventilation, medication record, brain and body imaging, and serial sequential labs.       Total time spent in the care of this patient today (excluding procedures & teaching): CCT 30 min                 Over 50% of the total time was spent in discussion and coordination of care with consulting services, dietary and rehab services.       Amanda Hale M.D., M.S.  Division of Acute Care Surgery

## 2025-01-05 NOTE — CHART NOTE - NSCHARTNOTEFT_GEN_A_CORE
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 Post-op course c/b worsening mental status and acute hypoxic respiratory failure requiring multiple intubations/extubations, most recently on 12/7 with conversion to tracheostomy on 12/17, e.coli bacteremia with RTOR on 12/7 for concern for worsening septic shock and cardiogenic shock s/p balloon pump placement and total abdominal colectomy in setting of ischemic bowel s/p OR on 12/9 for ileostomy creation, and oligouric MORAIMA requiring CRRT and now intermittent HD. As of 01/03/2025, the total bilirubin is uptrending (Tbili 25.8), rising INR (3.35), transaminitis, and thrombocytopenia. Transfusion Medicine was consulted by transplant team to initiate therapeutic plasma exchange (TPE) due to poor graft function (Course of 3 TPE over 3 days).     - PLEX#1: Performed on 01/03/25 with one plasma volume exchanged using donor plasma as replacement fluid. Patient tolerated the procedure well.    - PLEX#2: Performed on 01/04/25 with one plasma volume exchanged using donor plasma as replacement fluid. Patient tolerated the procedure well.    - PLEX#3: Performed on 01/05/25 with one plasma volume exchanged using donor plasma as replacement fluid. Patient tolerated the procedure well.    No additional TPE scheduled. Will assess response to TPE and review further management with the transplant team.

## 2025-01-05 NOTE — PROGRESS NOTE ADULT - SUBJECTIVE AND OBJECTIVE BOX
Transplant Surgery - Multidisciplinary Rounds  --------------------------------------------------------------  OLT   11/15/2024         Colectomy 12/7/2024     IABP 12/7 removed 12/10  Tracheostomy 12/17/2024    Present:   Patient seen and examined with multidisciplinary Transplant team including Surgeon: Dr. Hutchinson, Hepatologist Dr. Luis, JAZZ Caraballo  and bedside RN in AM rounds.   Disciplines not in attendance will be notified of the plan.     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     s/p OLT 11/15 with post op course c/b:  Hypoxia: Reintubated 11/20, extubated 11/24->reintubated 11/24, extubated 11/26, reintubated 12/7, trached 12/17  Ileus   A fib  AMS/Seizure   L brachial DVT  Neutropenia  E coli Bacteremia (12/6)  s/p Colectomy 12/7.   IABP 12/7, removed 12/10  Ec Faecalis UTI (12/16)  Stenotrophamonas in trach aspirate (12/19)  Clostridium in blood 12/23  UGIB 12/26    Interval/Overnight Events:                 Immunosuppression:  - Cyclo per level, MMF HELD, Solu 32  -ongoing monitoring for signs of rejection                        ROS: Unable to assess patient is lethargic, s/p tracheostomy    PHYSICAL EXAM:   Constitutional: trach to vent, awake, unresponsive  Eyes:  PERRLA, Scleral icterus  ENMT: nc/at, no thrush, NGT  Neck: supple, trach   Respiratory: CTA B/L  Cardiovascular: RRR  Gastrointestinal: incision clean/dry/intact + Ostomy pink output stool, wound vac, ALYSSA x2 ss  Genitourinary: +scrotal edema w/ large fluid collection; black/green discoloration  Extremities: SCD's in place and working bilaterally, + LE edema  Neurological: trach to vent, not following commands  Skin: large sacral decub, poor healing with breakdown, wound care applied  Transplant Surgery - Multidisciplinary Rounds  --------------------------------------------------------------  OLT   11/15/2024         Colectomy 12/7/2024     IABP 12/7 removed 12/10  Tracheostomy 12/17/2024    Present:   Patient seen and examined with multidisciplinary Transplant team including Surgeon: Dr. Hutchinson, Hepatologist Dr. Luis, JAZZ Colby/Ale  and bedside RN in AM rounds.   Disciplines not in attendance will be notified of the plan.     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     s/p OLT 11/15 with post op course c/b:  ·	Hypoxia: Reintubated 11/20, extubated 11/24->reintubated 11/24, extubated 11/26, reintubated 12/7, trached 12/17  ·	Ileus   ·	A fib  ·	AMS/Seizure   ·	L brachial DVT  ·	Neutropenia  ·	E coli Bacteremia (12/6)  ·	s/p Colectomy 12/7.   ·	IABP 12/7, removed 12/10  ·	Ec Faecalis UTI (12/16)  ·	Stenotrophamonas in trach aspirate (12/19)  ·	Clostridium in blood 12/23  ·	UGIB 12/26  ·	CMV Viremia  ·	MORAIMA requiring CRRT  ·	Shock liver    Interval/Overnight Events:   - s/p PLEX yest (session #2)  - resumed CRRT overnight   - a fib with RVR: s/p metoprolol     Immunosuppression:  - Cyclo per level, MMF HELD, Solu 32  -ongoing monitoring for signs of rejection      MEDICATIONS  (STANDING):  buDESOnide    Inhalation Suspension 0.5 milliGRAM(s) Inhalation every 12 hours  caspofungin IVPB      caspofungin IVPB 50 milliGRAM(s) IV Intermittent every 24 hours  chlorhexidine 0.12% Liquid 15 milliLiter(s) Oral Mucosa every 12 hours  chlorhexidine 2% Cloths 1 Application(s) Topical <User Schedule>  CRRT Treatment    <Continuous>  cycloSPORINE  , modified (GENGRAF) Solution 75 milliGRAM(s) Oral <User Schedule>  cycloSPORINE  , modified (GENGRAF) Solution 50 milliGRAM(s) Oral <User Schedule>  diphenoxylate/atropine 2 Tablet(s) Oral every 6 hours  ganciclovir IVPB 250 milliGRAM(s) IV Intermittent every 24 hours  imipenem/cilastatin  IVPB 500 milliGRAM(s) IV Intermittent every 6 hours  insulin NPH human recombinant 6 Unit(s) SubCutaneous every 6 hours  insulin regular Infusion 4 Unit(s)/Hr (4 mL/Hr) IV Continuous <Continuous>  loperamide Liquid 2 milliGRAM(s) Enteral Tube every 6 hours  methylPREDNISolone sodium succinate Injectable 32 milliGRAM(s) IV Push <User Schedule>  metoprolol tartrate 25 milliGRAM(s) Oral every 12 hours  metroNIDAZOLE  IVPB 500 milliGRAM(s) IV Intermittent every 8 hours  midodrine 10 milliGRAM(s) Oral every 8 hours  mupirocin 2% Ointment 1 Application(s) Topical every 12 hours  nystatin Powder 1 Application(s) Topical every 12 hours  pantoprazole  Injectable 40 milliGRAM(s) IV Push every 12 hours  phenylephrine    Infusion 0.1 MICROgram(s)/kG/Min (3.73 mL/Hr) IV Continuous <Continuous>  Phoxillum Filtration BK 4 / 2.5 5000 milliLiter(s) (1250 mL/Hr) CRRT <Continuous>  Phoxillum Filtration BK 4 / 2.5 5000 milliLiter(s) (250 mL/Hr) CRRT <Continuous>  phytonadione   Solution 5 milliGRAM(s) Enteral Tube every 24 hours  potassium chloride  10 mEq/100 mL IVPB 10 milliEquivalent(s) IV Intermittent every 1 hour  PrismaSATE Dialysate BK 0 / 3.5 5000 milliLiter(s) (1500 mL/Hr) CRRT <Continuous>  trimethoprim  40 mG/sulfamethoxazole 200 mG Suspension 80 milliGRAM(s) Enteral Tube daily  ursodiol Suspension 300 milliGRAM(s) Oral every 12 hours    MEDICATIONS  (PRN):  albuterol/ipratropium for Nebulization 3 milliLiter(s) Nebulizer every 6 hours PRN Shortness of Breath and/or Wheezing  FIRST- Mouthwash  BLM 10 milliLiter(s) Swish and Spit every 8 hours PRN Mouth Care  oxyCODONE    Solution 5 milliGRAM(s) Oral every 4 hours PRN Severe Pain (7 - 10)  oxyCODONE    Solution 2.5 milliGRAM(s) Oral every 4 hours PRN Moderate Pain (4 - 6)      PAST MEDICAL & SURGICAL HISTORY:  Diabetes  Transaminitis  Paroxysmal atrial fibrillation  Depression  BPH (benign prostatic hyperplasia)  Hypertension  Chronic atrial fibrillation  Coronary artery disease  Hepatocellular carcinoma  DM (diabetes mellitus)  HTN (hypertension)  Paroxysmal atrial fibrillation  Cirrhosis  HCC (hepatocellular carcinoma)  History of BPH  History of laparoscopic cholecystectomy  History of lumbar laminectomy  H/O prior ablation treatment  H/O percutaneous left heart catheterization    Vital Signs Last 24 Hrs  T(C): 37.5 (05 Jan 2025 07:00), Max: 37.6 (05 Jan 2025 03:00)  T(F): 99.5 (05 Jan 2025 07:00), Max: 99.7 (05 Jan 2025 03:00)  HR: 104 (05 Jan 2025 08:00) (84 - 130)  BP: 102/54 (05 Jan 2025 08:00) (79/42 - 172/76)  BP(mean): 74 (05 Jan 2025 08:00) (54 - 113)  RR: 24 (05 Jan 2025 08:00) (7 - 38)  SpO2: 98% (05 Jan 2025 08:00) (91% - 99%)    Parameters below as of 05 Jan 2025 07:00  Patient On (Oxygen Delivery Method): ventilator    I&O's Summary    04 Jan 2025 07:01  -  05 Jan 2025 07:00  --------------------------------------------------------  IN: 4335.8 mL / OUT: 3134 mL / NET: 1201.8 mL    05 Jan 2025 07:01  -  05 Jan 2025 08:44  --------------------------------------------------------  IN: 172.4 mL / OUT: 241 mL / NET: -68.6 mL                        8.0    4.77  )-----------( 42       ( 05 Jan 2025 05:46 )             24.2     01-05    143  |  106  |  49[H]  ----------------------------<  157[H]  3.1[L]   |  17[L]  |  0.51    Ca    9.6      05 Jan 2025 05:45  Phos  3.0     01-05  Mg     2.2     01-05    TPro  4.6[L]  /  Alb  3.3  /  TBili  16.4[H]  /  DBili  x   /  AST  54[H]  /  ALT  111[H]  /  AlkPhos  84  01-05      Culture - Blood (collected 01-02-25 @ 00:30)  Source: .Blood BLOOD  Preliminary Report (01-05-25 @ 05:01):    No growth at 72 Hours    Culture - Blood (collected 01-01-25 @ 01:23)  Source: .Blood BLOOD  Preliminary Report (01-05-25 @ 05:01):    No growth at 72 Hours          ROS: Unable to assess patient is lethargic, s/p tracheostomy    PHYSICAL EXAM:   Constitutional: trach to vent, awake, unresponsive  Eyes:  PERRLA, Scleral icterus  ENMT: nc/at, no thrush, NGT  Neck: supple, trach   Respiratory: CTA B/L  Cardiovascular: RRR  Gastrointestinal: incision clean/dry/intact + Ostomy pink output stool, wound vac, ALYSSA x2 ss  Genitourinary: +scrotal edema w/ large fluid collection; black/green discoloration  Extremities: SCD's in place and working bilaterally, + LE edema  Neurological: trach to vent, not following commands  Skin: large sacral decub, poor healing with breakdown, wound care applied

## 2025-01-05 NOTE — PROGRESS NOTE ADULT - NS ATTEND AMEND GEN_ALL_CORE FT
Plex #3 today  restarted on phenylephrine this AM.  CMV recheck tomorrow. Cultures otherwise negative  ID: Caspo/Flaygl/Imipenem.  Ostomy output decreased to 1600

## 2025-01-05 NOTE — PROGRESS NOTE ADULT - ASSESSMENT
74 y.o. man with history of Afib (s/p ablation), CAD, GONZALES cirrhosis/HCC w/ portal HTN s/p OLT on 11/15 c/b cardiogenic shock s/p IABP (12/7-10), septic shock, respiratory failure s/p trach, renal failure (on CCRT), and mesenteric ischemia s/p total abdominal colectomy and end ileostomy (12/9) who remains critically ill in the SICU. Ostomy itself is pink and viable appearing. Blood in output is dark, suggesting possible UGI source. Given EGD finding from 12/26, likely due to ongoing oozing from stomach, especially in the setting of elevated INR.      RECOMMENDATIONS:  - Continue lomotil 2 tabs q6  - Imodium 2mg q6 added 1/4/24, monitor for changes in ileostomy output  - No indication for tincture of opium at this time  - No colorectal surgery intervention indicated at this time  - Appreciate remainder of care per SICU/Transplant    [facility] [phone]

## 2025-01-05 NOTE — PROGRESS NOTE ADULT - SUBJECTIVE AND OBJECTIVE BOX
HPI:  Patient seen and examined at bedside in SICU.    Review Of Systems:           Respiratory: No shortness of breath, cough, or wheezing  Cardiovascular: No chest pain or palpitations  10 point review of systems is otherwise negative except as mentioned above        Medications:  albumin human 25% IVPB 50 milliLiter(s) IV Intermittent every 6 hours  albuterol/ipratropium for Nebulization 3 milliLiter(s) Nebulizer every 6 hours PRN  buDESOnide    Inhalation Suspension 0.5 milliGRAM(s) Inhalation every 12 hours  caspofungin IVPB 50 milliGRAM(s) IV Intermittent every 24 hours  caspofungin IVPB      chlorhexidine 0.12% Liquid 15 milliLiter(s) Oral Mucosa every 12 hours  chlorhexidine 2% Cloths 1 Application(s) Topical <User Schedule>  CRRT Treatment    <Continuous>  diphenoxylate/atropine 2 Tablet(s) Oral every 6 hours  FIRST- Mouthwash  BLM 10 milliLiter(s) Swish and Spit every 8 hours PRN  ganciclovir IVPB 250 milliGRAM(s) IV Intermittent every 24 hours  imipenem/cilastatin  IVPB 500 milliGRAM(s) IV Intermittent every 6 hours  insulin lispro (ADMELOG) corrective regimen sliding scale   SubCutaneous every 4 hours  loperamide Liquid 2 milliGRAM(s) Enteral Tube every 6 hours  methylPREDNISolone sodium succinate Injectable 32 milliGRAM(s) IV Push <User Schedule>  metoprolol tartrate 25 milliGRAM(s) Oral every 8 hours  metroNIDAZOLE  IVPB 500 milliGRAM(s) IV Intermittent every 8 hours  midodrine 10 milliGRAM(s) Oral every 8 hours  mupirocin 2% Ointment 1 Application(s) Topical every 12 hours  nystatin Powder 1 Application(s) Topical every 12 hours  oxyCODONE    Solution 5 milliGRAM(s) Oral every 4 hours PRN  oxyCODONE    Solution 2.5 milliGRAM(s) Oral every 4 hours PRN  pantoprazole  Injectable 40 milliGRAM(s) IV Push every 12 hours  phenylephrine    Infusion 0.1 MICROgram(s)/kG/Min IV Continuous <Continuous>  Phoxillum Filtration BK 4 / 2.5 5000 milliLiter(s) CRRT <Continuous>  Phoxillum Filtration BK 4 / 2.5 5000 milliLiter(s) CRRT <Continuous>  PrismaSATE Dialysate BGK 4 / 2.5 5000 milliLiter(s) CRRT <Continuous>  trimethoprim  40 mG/sulfamethoxazole 200 mG Suspension 80 milliGRAM(s) Enteral Tube daily  ursodiol Suspension 300 milliGRAM(s) Oral every 12 hours    PAST MEDICAL & SURGICAL HISTORY:  Diabetes      Transaminitis      Paroxysmal atrial fibrillation      Depression      BPH (benign prostatic hyperplasia)      Hypertension      Chronic atrial fibrillation      Coronary artery disease      Hepatocellular carcinoma      DM (diabetes mellitus)      HTN (hypertension)      Paroxysmal atrial fibrillation      Cirrhosis      HCC (hepatocellular carcinoma)      History of BPH      History of laparoscopic cholecystectomy      History of lumbar laminectomy      H/O prior ablation treatment      H/O percutaneous left heart catheterization        Vitals:  T(C): 36.9 (01-06-25 @ 07:00), Max: 37.6 (01-06-25 @ 03:00)  HR: 81 (01-06-25 @ 08:30) (81 - 109)  BP: 95/48 (01-06-25 @ 08:30) (84/45 - 143/63)  BP(mean): 69 (01-06-25 @ 08:30) (59 - 91)  RR: 15 (01-06-25 @ 08:30) (15 - 36)  SpO2: 97% (01-06-25 @ 08:30) (85% - 100%)  Wt(kg): --  Daily     Daily   I&O's Summary    05 Jan 2025 07:01  -  06 Jan 2025 07:00  --------------------------------------------------------  IN: 5812.3 mL / OUT: 2934 mL / NET: 2878.3 mL    06 Jan 2025 07:01  -  06 Jan 2025 08:45  --------------------------------------------------------  IN: 72.5 mL / OUT: 0 mL / NET: 72.5 mL        Physical Exam:  Appearance: Critically ill  Eyes: PERRL, EOMI, pink conjunctiva  HENT: +Trach, +NGT  Cardiovascular: RRR, S1, S2  Respiratory: Clear to auscultation bilaterally  Gastrointestinal: soft, non-tender, non-distended with normal bowel sounds  Musculoskeletal: No clubbing; no joint deformity   Neurologic: Non-focal  Skin: sacral decubitus ulcer and scrotal necrosis noted                          8.8    5.11  )-----------( 14       ( 06 Jan 2025 06:17 )             25.8     01-06    141  |  107  |  43[H]  ----------------------------<  220[H]  4.0   |  20[L]  |  0.41[L]    Ca    8.4      06 Jan 2025 06:17  Phos  3.7     01-06  Mg     2.2     01-06    TPro  4.2[L]  /  Alb  2.8[L]  /  TBili  15.1[H]  /  DBili  x   /  AST  47[H]  /  ALT  82[H]  /  AlkPhos  89  01-06    PT/INR - ( 06 Jan 2025 06:17 )   PT: 23.6 sec;   INR: 2.07 ratio         PTT - ( 06 Jan 2025 06:17 )  PTT:42.2 sec        Cardiovascular Diagnostic Testing:  ECG: sinus rhythm    Echo: < from: Intra-Operative Transesophageal Echo W or WO Ultrasound Enhancing Agent (11.15.24 @ 07:46) >  --------------------------------------------------------------------------------  PRE-BYPASS FINDINGS     Left Ventricle:  Left ventricular ejection fraction is estimated at 60 to 65%. Normal left ventricularwall thickness. The left ventricular systolic function is normal There are no regional wall motion abnormalities seen.     Right Ventricle:  The right ventricular cavity is normal in size, with normal wall thickness and right ventricular systolic function is normal. Right ventricular systolic function is normal.     Left Atrium:  The left atrium is normal in size. No thrombus visualized in left atrial appendage.     Right Atrium:  The right atrium is normal in size. The right atrium is normal in size.     Interatrial Septum:  No PFO visualized with color flow doppler.     Aortic Valve:  The aortic valve appears trileaflet. There is no aortic valve stenosis. There is trace aortic regurgitation.     Mitral Valve:  There is no mitral valve stenosis. There is no mitral valve stenosis. There is trace mitral regurgitation.     Tricuspid Valve:  There is no evidence of tricuspid stenosis. There is trace tricuspid regurgitation.     Pericardium:  No pericardial effusion seen.     Post-Bypass:  S/P OLT. Under current loading conditions, hyperdynamic left ventricular systolic function, EF: 70-75% by visual estimate, no RWMA. Normal right ventricular systolic function. All other findings unchanged. Probe removed atraumatically, no blood. The patient tolerated the procedure well and without complications. Permanent recorded images are stored in the medical record.     Electronically signed by James Villareal on 11/15/2024 at 2:18:16 PM         *** Final ***    < end of copied text >      < from: TTE Limited W or WO Ultrasound Enhancing Agent (12.11.24 @ 09:28) >  _______________________________________________________________________________________     CONCLUSIONS:      1. Endocardial visualization enhanced with Definity (Ultrasound Enhancing Agent).   2. Left ventricular systolic function is normal with an ejection fraction visually estimated at 55 to 60 %.   3. Enlarged right ventricular cavity size and mildly reduced right ventricular systolic function.   4. Device lead is visualized in the right heart.    ________________________________________________________________________________________    < end of copied text >      Stress Testing:     Cath: 4/24/2024, patient had his LHC repeated with Ben Villareal MD at [facility].  Cath showed multivessel coronary artery disease, but the lesions previously noted were FFR negative.  He continues to have MIRTA 3 flow throughout.    Interpretation of Telemetry: Sinus     Imaging:

## 2025-01-05 NOTE — PROGRESS NOTE ADULT - SUBJECTIVE AND OBJECTIVE BOX
24 HOUR EVENTS:  INTERVAL EVENTS:  - tolerating trach collar  - PLEX 2nd session  - holding CRRT w possible diuretic challenge  - 1 plt for TEG correction  - started NPH 6 U q 6   - Bumex 2mg x1  - Afib w RVR > metop 5 IV  - insulin gtt started for -400s  - hypotensive to SBP 90s, given 250 albumin given high output from drains and ileostomy  - another 250 albumin   - TEG corrected w 1 plt and 1 cryo      NEURO  Exam: AOx0 somewhat arousable  Meds: oxyCODONE    Solution 5 milliGRAM(s) Oral every 4 hours PRN Severe Pain (7 - 10)  oxyCODONE    Solution 2.5 milliGRAM(s) Oral every 4 hours PRN Moderate Pain (4 - 6)      RESPIRATORY  RR: 27 (01-04-25 @ 22:30) (20 - 38)  SpO2: 98% (01-04-25 @ 22:30) (93% - 100%)  Wt(kg): --  Exam: unlabored, clear to auscultation bilaterally  Mechanical Ventilation: Mode: AC/ CMV (Assist Control/ Continuous Mandatory Ventilation), RR (machine): 14, RR (patient): 25, TV (machine): 450, FiO2: 30, PEEP: 5, ITime: 0.9, MAP: 9.8, PIP: 19  ABG - ( 04 Jan 2025 02:00 )  pH: 7.41  /  pCO2: 32    /  pO2: 107   / HCO3: 20    / Base Excess: -3.7  /  SaO2: 98.6    Lactate: x                [N/A] Extubation Readiness Assessed  Meds: albuterol/ipratropium for Nebulization 3 milliLiter(s) Nebulizer every 6 hours PRN Shortness of Breath and/or Wheezing  buDESOnide    Inhalation Suspension 0.5 milliGRAM(s) Inhalation every 12 hours        CARDIOVASCULAR  HR: 93 (01-04-25 @ 22:30) (84 - 130)  BP: 102/56 (01-04-25 @ 22:30) (91/52 - 172/76)  BP(mean): 76 (01-04-25 @ 22:30) (65 - 113)  ABP: 276/276 (01-04-25 @ 03:00) (113/53 - 276/276)  ABP(mean): 276 (01-04-25 @ 03:00) (74 - 276)  Wt(kg): --  CVP(cm H2O): --  VBG - ( 04 Jan 2025 23:00 )  pH: 7.30  /  pCO2: 40    /  pO2: 37    / HCO3: 20    / Base Excess: -6.3  /  SaO2: 62.0   Lactate: 6.7                Exam: regular rate and rhythm  Cardiac Rhythm: sinus  Perfusion     [x]Adequate   [ ]Inadequate  Extremities  [x]Warm         [ ]Cool  Volume Status [ ]Hypervolemic [x]Euvolemic [ ]Hypovolemic  Meds: metoprolol tartrate 25 milliGRAM(s) Oral every 12 hours  midodrine 10 milliGRAM(s) Oral every 8 hours      GI/NUTRITION  Exam: soft, nontender, nondistended, ostomy viable  Diet: tube feeds  Meds: diphenoxylate/atropine 2 Tablet(s) Oral every 6 hours  loperamide Liquid 2 milliGRAM(s) Enteral Tube every 6 hours  pantoprazole  Injectable 40 milliGRAM(s) IV Push every 12 hours  ursodiol Suspension 300 milliGRAM(s) Oral every 12 hours      GENITOURINARY  I&O's Detail    01-03 @ 07:01 - 01-04 @ 07:00  --------------------------------------------------------  IN:    Albumin 5%  - 500 mL: 1750 mL    Cryoprecipitate: 225 mL    Enteral Tube Flush: 990 mL    IV PiggyBack: 100 mL    IV PiggyBack: 200 mL    IV PiggyBack: 850 mL    Nepro with Carb Steady: 275 mL    Phenylephrine: 149.5 mL    Platelets - Single Donor: 975 mL    PRBCs (Packed Red Blood Cells): 900 mL    Vital1.5: 975 mL  Total IN: 7389.5 mL    OUT:    Bulb (mL): 725 mL    Bulb (mL): 20 mL    Ileostomy (mL): 3375 mL    Indwelling Catheter - Urethral (mL): 10 mL    Other (mL): 145 mL    VAC (Vacuum Assisted Closure) System (mL): 0 mL    Vasopressin: 0 mL  Total OUT: 4275 mL    Total NET: 3114.5 mL      01-04 @ 07:01  -  01-05 @ 01:17  --------------------------------------------------------  IN:    Albumin 5%  - 500 mL: 250 mL    Enteral Tube Flush: 190 mL    Insulin: 9 mL    IV PiggyBack: 250 mL    IV PiggyBack: 400 mL    IV PiggyBack: 500 mL    Platelets - Single Donor: 225 mL    Vital1.5: 975 mL  Total IN: 2799 mL    OUT:    Bulb (mL): 925 mL    Ileostomy (mL): 800 mL    Indwelling Catheter - Urethral (mL): 7 mL  Total OUT: 1732 mL    Total NET: 1067 mL          01-04    146[H]  |  107  |  58[H]  ----------------------------<  327[H]  3.2[L]   |  18[L]  |  0.62    Ca    9.3      04 Jan 2025 23:13  Phos  4.0     01-04  Mg     2.2     01-04    TPro  4.3[L]  /  Alb  3.0[L]  /  TBili  14.1[H]  /  DBili  x   /  AST  45[H]  /  ALT  103[H]  /  AlkPhos  79  01-04    Meds: phytonadione   Solution 5 milliGRAM(s) Enteral Tube every 24 hours  potassium chloride  10 mEq/100 mL IVPB 10 milliEquivalent(s) IV Intermittent every 1 hour        HEMATOLOGIC  Meds:   [x] VTE Prophylaxis                        8.3    4.00  )-----------( 35       ( 04 Jan 2025 23:13 )             25.4     PT/INR - ( 04 Jan 2025 23:13 )   PT: 20.8 sec;   INR: 1.84 ratio         PTT - ( 04 Jan 2025 23:13 )  PTT:35.9 sec      INFECTIOUS DISEASES  WBC Count: 4.00 K/uL (01-04 @ 23:13)  WBC Count: 2.78 K/uL (01-04 @ 17:47)  WBC Count: 3.13 K/uL (01-04 @ 12:07)  WBC Count: 2.81 K/uL (01-04 @ 07:06)  WBC Count: 2.78 K/uL (01-04 @ 02:08)    RECENT CULTURES:  Specimen Source: .Blood BLOOD  Date/Time: 01-02 @ 00:30  Culture Results:   No growth at 48 Hours  Gram Stain: --  Organism: --  Specimen Source: .Blood BLOOD  Date/Time: 01-01 @ 01:23  Culture Results:   No growth at 48 Hours  Gram Stain: --  Organism: --    Meds: caspofungin IVPB      caspofungin IVPB 50 milliGRAM(s) IV Intermittent every 24 hours  cycloSPORINE  , modified (GENGRAF) Solution 75 milliGRAM(s) Oral <User Schedule>  cycloSPORINE  , modified (GENGRAF) Solution 50 milliGRAM(s) Oral <User Schedule>  ganciclovir IVPB 250 milliGRAM(s) IV Intermittent every 24 hours  imipenem/cilastatin  IVPB 500 milliGRAM(s) IV Intermittent every 6 hours  metroNIDAZOLE  IVPB 500 milliGRAM(s) IV Intermittent every 8 hours  trimethoprim  40 mG/sulfamethoxazole 200 mG Suspension 80 milliGRAM(s) Enteral Tube daily        ENDOCRINE  CAPILLARY BLOOD GLUCOSE      POCT Blood Glucose.: 298 mg/dL (05 Jan 2025 00:55)  POCT Blood Glucose.: 353 mg/dL (04 Jan 2025 23:53)  POCT Blood Glucose.: 324 mg/dL (04 Jan 2025 23:07)  POCT Blood Glucose.: 346 mg/dL (04 Jan 2025 22:03)  POCT Blood Glucose.: 353 mg/dL (04 Jan 2025 20:58)  POCT Blood Glucose.: 383 mg/dL (04 Jan 2025 17:40)  POCT Blood Glucose.: 300 mg/dL (04 Jan 2025 11:51)  POCT Blood Glucose.: 269 mg/dL (04 Jan 2025 06:36)    Meds: insulin NPH human recombinant 6 Unit(s) SubCutaneous every 6 hours  insulin regular Infusion 4 Unit(s)/Hr IV Continuous <Continuous>  methylPREDNISolone sodium succinate Injectable 32 milliGRAM(s) IV Push <User Schedule>        CODE STATUS:

## 2025-01-06 NOTE — PROGRESS NOTE ADULT - ASSESSMENT
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 Post op course complicated by acute hypoxic respiratory failure requiring intubation multiple times, most recently on 12/7 with conversion to tracheostomy on 12/17, e.coli bacteremia with RTOR on 12/7 for concern for worsening septic shock and cardiogenic shock s/p balloon pump placement and total abdominal colectomy in setting of ischemic bowel s/p OR on 12/9 for ileostomy creation, and olirugirc MORAIMA requiring CRRT and now intermittent HD.    Diagnosed with pneumonia secondary to Stenotrophomonas    Also with growth of Enterococcus faecalis from abdominal drains and urine culture    More fever and shock event on 12/29/24 likely 2/2 GIB    UA (12/19) 2 WBC  BCx (12/23) Clostridium paraputrificum  Bronch Cx (12/23) NGTD    CT Chest (12/23) Bibasilar groundglass and tree in bud opacities which may represent distal airway impaction versus pneumonia.    CT A/P (12/23) Peripheral wedge-shaped areas of hypoattenuation involving the superior aspect of the spleen, suggestive of splenic infarcts (12-59). Mildly dilated loops of proximal small bowel without transition point, likely ileus.    Testicular US (12/23) No appreciable arterial flow with very limited venous flow in in the testes bilaterally. Interval decrease in diffuse scrotal edema. Small bilateral hydroceles.    CT Pelvis (12/25) Moderate diffuse subcutaneous/scrotal edema without defined collection or subcutaneous air.    BCX (12/28): Gram variable rods (Blood PCR neg)    Antibiotic Course:  Meropenem ( 11/22-11/29, 11/22-11/29, 12/16-)   Minocycline (12/21-1/1)  Bactrim (11/16-11/24, 12/8-12/11, 12/21-)  Fluconazole (11/16-12/2, 12/12-12/16)   Caspofungin ( 12/6-12/11, 12/17-)  Cefepime (12/10-12/16)   Metronidazole (12/10-12/14)   Ganciclovir (12/7-12/13)   Linezolid (11/23-11/26, 12/6-12/11, 12/28-12/29)   Atovaquone (11/29-12/6)   Zosyn (11/20-11/22,12/6)   Tobramycin (12/6)   Valganciclovir (11/6-12/4)    #Positive Blood Culture (12/23 Clostridium paraputrificum) (12/28: Gram variable rods -Blood PCR neg)  Clostridium paraputrificum is generally penicillin and metronidazole susceptible      #Leukocytosis, Fever, Shock, Transaminitis,   #Stenotrophomonas tracheo/Pneumonia? s/p minocycline course  # polymicrobial bacteremia E faecalis; Clostridium spp in c/o Gi pathology but also necrotic scrotum     --If further worsening shock overnight would repeat blood cultures and administer Tobramycin 600 mg IV x1 (7 mg/kg based on adjusted body weight).   --CT C/A/P showing Small bilateral pleural effusions with associated compressive atelectasis, increased since prior exam. Mild distention and mural thickening of small bowel of concern for enteritis. Status post liver transplant and colectomy with stable postsurgical changes.  --s/p 10 day course of Minocycline 200 mg IV Q12H (12/21 >1/1) for Stenorophomnas   --Continue flagyl 500mg Q8, and  Imipenem 500mg Q6  --remains on Caspofungin;   -Decrease immunosuppression if possible   improved hemodynamics and likely from fluid shift, recommend no additional doses of tobtramycin and can stop linezolid  BiT today - continuing plasmapheresis    #Liver Transplant Recipient, Prophylactic Antibiotic  --Repeat CMV detectable on 12/31  --Given thrombocytopenia can consider holding Bactrim or now  Cytomegalovirus By PCR: Det <34.5 IU/mL (12.23.24 @ 06:15)  Cytomegalovirus By PCR: 537 IU/mL (12.31.24 @ 13:36)  --restarted on induction therapy with ganciclovir IV adjusted for CVVH dosing  check CMV PCR monday      Thank you for involving us in the care of this patient  Transplant ID will continue to follow  Please call or page with additional questions  Pager; #[phone]  Teams: from 8 am to 5 pm  Rachele Lewis MD

## 2025-01-06 NOTE — PROGRESS NOTE ADULT - NS ATTEND AMEND GEN_ALL_CORE FT
74 yr M w liver failure, s/p OLT in nov 24 now w trach 12/17, bacteremia, return to OR, total colectomy, ileostomy creation, cardiogenic shock, oliguric renal fialure requiring CRRT    ON: no major changes    only opens eyes, arouses to voice, not following  brain imaging unreavling as the cause  portex 7 trach  AM CXR persistent bibasilar pl effusion  PRVC unable to wean  off kassandra for now  on home metop q8hr  TTE 12/11 EF 55%  NPO  NG tube feeds at goals  lomotil, imodium, has ostomy output high  LFTs worsening, despite 3 rounds of plasmapheresis   anuric, net balance 2.8 lit pos   CRRT net even, may advance slow to volume removal given anasarca  hb stable, severe thrombocytopenia, requiring frequent transfusions  no chem VTE PPX  caspo, merop, flagyl, imipenem per ID transplant  combicath steno, bcx clostridium, e fecal from abd  afebriel overnight  glycemic control  prednisone for immunosuppresion  family at bedside   rt IJ HD, left IJ CVC 74 yr M w liver failure, s/p OLT in nov 24 now w trach 12/17, bacteremia, return to OR, total colectomy, ileostomy creation, cardiogenic shock, oliguric renal fialure requiring CRRT    ON: no major changes    only opens eyes, arouses to voice, not following  brain imaging unreavling as the cause  portex 7 trach  AM CXR persistent bibasilar pl effusion  PRVC unable to wean  off kassandra for now  on home metop q8hr  TTE 12/11 EF 55%  NPO  NG tube feeds at goals  lomotil, imodium, has ostomy output high  LFTs worsening, despite 3 rounds of plasmapheresis   anuric, net balance 2.8 lit pos   albumin boluses q6hr  CRRT net even, may advance slow to volume removal given anasarca  hb stable, severe thrombocytopenia, requiring frequent transfusions  no chem VTE PPX  caspo, merop, flagyl, imipenem per ID transplant  combicath steno, bcx clostridium, e fecal from abd  afebriel overnight  glycemic control  prednisone for immunosuppresion  family at bedside   rt IJ HD, left IJ CVC

## 2025-01-06 NOTE — PROGRESS NOTE ADULT - SUBJECTIVE AND OBJECTIVE BOX
Transplant Surgery - Multidisciplinary Rounds  --------------------------------------------------------------  OLT   11/15/2024         Colectomy 12/7/2024     IABP 12/7 removed 12/10  Tracheostomy 12/17/2024    Present:   Patient seen and examined with multidisciplinary Transplant team including Surgeon: Dr. Dagher, Hepatologist Dr. Luis, JAZZ Garcia/Gopi and bedside RN in AM rounds.   Disciplines not in attendance will be notified of the plan.     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   s/p OLT 11/15 with post op course c/b:  ·	Hypoxia: Reintubated 11/20, extubated 11/24->reintubated 11/24, extubated 11/26, reintubated 12/7, trached 12/17  ·	Ileus   ·	A fib  ·	AMS/Seizure   ·	L brachial DVT  ·	Neutropenia  ·	E coli Bacteremia (12/6)  ·	s/p Colectomy 12/7.   ·	IABP 12/7, removed 12/10  ·	Ec Faecalis UTI (12/16)  ·	Stenotrophamonas in trach aspirate (12/19)  ·	Clostridium in blood 12/23  ·	UGIB 12/26  ·	CMV Viremia  ·	MORAIMA requiring CRRT  ·	Shock liver    Interval/Overnight Events:   - s/p PLEX yest (session #3)  - resumed tube feeds      Immunosuppression:  - Cyclo per level, MMF HELD, Solu 32  -ongoing monitoring for signs of rejection      MEDICATIONS  (STANDING):  albumin human 25% IVPB 50 milliLiter(s) IV Intermittent every 6 hours  buDESOnide    Inhalation Suspension 0.5 milliGRAM(s) Inhalation every 12 hours  caspofungin IVPB      caspofungin IVPB 50 milliGRAM(s) IV Intermittent every 24 hours  chlorhexidine 0.12% Liquid 15 milliLiter(s) Oral Mucosa every 12 hours  chlorhexidine 2% Cloths 1 Application(s) Topical <User Schedule>  CRRT Treatment    <Continuous>  cycloSPORINE  , modified (GENGRAF) Solution 75 milliGRAM(s) Oral <User Schedule>  diphenoxylate/atropine 2 Tablet(s) Oral every 6 hours  ganciclovir IVPB 250 milliGRAM(s) IV Intermittent every 24 hours  imipenem/cilastatin  IVPB 500 milliGRAM(s) IV Intermittent every 6 hours  insulin lispro (ADMELOG) corrective regimen sliding scale   SubCutaneous every 4 hours  loperamide Liquid 2 milliGRAM(s) Enteral Tube every 6 hours  methylPREDNISolone sodium succinate Injectable 32 milliGRAM(s) IV Push <User Schedule>  metoprolol tartrate 25 milliGRAM(s) Oral every 8 hours  metroNIDAZOLE  IVPB 500 milliGRAM(s) IV Intermittent every 8 hours  midodrine 10 milliGRAM(s) Oral every 8 hours  mupirocin 2% Ointment 1 Application(s) Topical every 12 hours  nystatin Powder 1 Application(s) Topical every 12 hours  pantoprazole  Injectable 40 milliGRAM(s) IV Push every 12 hours  phenylephrine    Infusion 0.1 MICROgram(s)/kG/Min (3.73 mL/Hr) IV Continuous <Continuous>  Phoxillum Filtration BK 4 / 2.5 5000 milliLiter(s) (1200 mL/Hr) CRRT <Continuous>  Phoxillum Filtration BK 4 / 2.5 5000 milliLiter(s) (200 mL/Hr) CRRT <Continuous>  PrismaSATE Dialysate BGK 4 / 2.5 5000 milliLiter(s) (1500 mL/Hr) CRRT <Continuous>  trimethoprim  40 mG/sulfamethoxazole 200 mG Suspension 80 milliGRAM(s) Enteral Tube daily  ursodiol Suspension 300 milliGRAM(s) Oral every 12 hours    MEDICATIONS  (PRN):  albuterol/ipratropium for Nebulization 3 milliLiter(s) Nebulizer every 6 hours PRN Shortness of Breath and/or Wheezing  FIRST- Mouthwash  BLM 10 milliLiter(s) Swish and Spit every 8 hours PRN Mouth Care  oxyCODONE    Solution 5 milliGRAM(s) Oral every 4 hours PRN Severe Pain (7 - 10)  oxyCODONE    Solution 2.5 milliGRAM(s) Oral every 4 hours PRN Moderate Pain (4 - 6)      PAST MEDICAL & SURGICAL HISTORY:  Diabetes      Transaminitis      Paroxysmal atrial fibrillation      Depression      BPH (benign prostatic hyperplasia)      Hypertension      Chronic atrial fibrillation      Coronary artery disease      Hepatocellular carcinoma      DM (diabetes mellitus)      HTN (hypertension)      Paroxysmal atrial fibrillation      Cirrhosis      HCC (hepatocellular carcinoma)      History of BPH      History of laparoscopic cholecystectomy      History of lumbar laminectomy      H/O prior ablation treatment      H/O percutaneous left heart catheterization          Vital Signs Last 24 Hrs  T(C): 36.4 (06 Jan 2025 15:00), Max: 37.6 (06 Jan 2025 03:00)  T(F): 97.5 (06 Jan 2025 15:00), Max: 99.7 (06 Jan 2025 03:00)  HR: 78 (06 Jan 2025 17:21) (69 - 108)  BP: 132/63 (06 Jan 2025 17:00) (84/45 - 143/63)  BP(mean): 91 (06 Jan 2025 17:00) (59 - 91)  RR: 16 (06 Jan 2025 17:00) (15 - 36)  SpO2: 92% (06 Jan 2025 17:21) (90% - 100%)    Parameters below as of 06 Jan 2025 17:21  Patient On (Oxygen Delivery Method): tracheostomy collar        I&O's Summary    05 Jan 2025 07:01  -  06 Jan 2025 07:00  --------------------------------------------------------  IN: 5812.3 mL / OUT: 2934 mL / NET: 2878.3 mL    06 Jan 2025 07:01  -  06 Jan 2025 18:00  --------------------------------------------------------  IN: 1702.5 mL / OUT: 1353 mL / NET: 349.5 mL                              8.3    2.86  )-----------( 19       ( 06 Jan 2025 12:35 )             24.9     01-06    140  |  106  |  41[H]  ----------------------------<  208[H]  4.1   |  21[L]  |  0.38[L]    Ca    7.9[L]      06 Jan 2025 12:35  Phos  3.4     01-06  Mg     2.3     01-06    TPro  4.1[L]  /  Alb  2.9[L]  /  TBili  15.0[H]  /  DBili  x   /  AST  44[H]  /  ALT  76[H]  /  AlkPhos  84  01-06          Culture - Blood (collected 01-05-25 @ 05:00)  Source: .Blood BLOOD  Preliminary Report (01-06-25 @ 09:01):    No growth at 24 hours    Culture - Blood (collected 01-05-25 @ 02:20)  Source: .Blood BLOOD  Preliminary Report (01-06-25 @ 09:01):    No growth at 24 hours    Culture - Blood (collected 01-02-25 @ 00:30)  Source: .Blood BLOOD  Preliminary Report (01-06-25 @ 05:00):    No growth at 4 days    Culture - Blood (collected 01-01-25 @ 01:23)  Source: .Blood BLOOD  Preliminary Report (01-06-25 @ 05:00):    No growth at 4 days                                                                                                              ROS: Unable to assess patient is lethargic, s/p tracheostomy    PHYSICAL EXAM:   Constitutional: trach to vent, awake, unresponsive  Eyes:  PERRLA, Scleral icterus  ENMT: nc/at, no thrush, NGT  Neck: supple, trach   Respiratory: CTA B/L  Cardiovascular: RRR  Gastrointestinal: incision clean/dry/intact + Ostomy pink output stool, wound vac, ALYSSA x2 ss  Genitourinary: +scrotal edema w/ large fluid collection; black/green discoloration  Extremities: SCD's in place and working bilaterally, + LE edema  Neurological: trach to vent, not following commands  Skin: large sacral decub, poor healing with breakdown, wound care applied

## 2025-01-06 NOTE — PROGRESS NOTE ADULT - ASSESSMENT
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 Post-op course c/b worsening mental status and acute hypoxic respiratory failure requiring multiple intubations/extubations, currently extubated (as of 11/26/24) and colonic ileus s/p several rounds of Relistor and Neostigmine with NGT and rectal tube currently in place now with septic shock due to ischemic bowel s/p total colectomy with ostomy creation. Pt. now septic with blood culture positive for clostridium paraputrificum and s/p PLEX 3 session for hyperbilirubinemia.  Transplant nephrology consulted for metabolic acidosis and MORAIMA.    1. s/p OLT 11/15/24 with oliguric MORAIMA in setting of septic shock.  Likely hemodynamically mediated in setting of cardiogenic and septic shock on multiple vasopressors and prior IABP.   - CT AP non-con: Bilateral renal cysts including cysts with peripheral calcification in the left kidney.  - Initiated on CRRT 12/7/24- 12/15/24 at 1AM stopped. s/p IHD 12/18/24 however required levophed.   - Now back on CRRT, remains oliguric with 20 cc UOP in 24 hour. Pt. remains on and off vasopressor support and midodrine 10mg TID.   - Will continue CRRT for now   - Dose medication per CRRT. Monitor BP and labs.     If you have any questions, please feel free to contact me:  Magaly Tello MD PGY-4  Nephrology Fellow  [employer] Teams (Preferred)/ Pager [phone]   (After 5pm or on weekends please page the on-call fellow)

## 2025-01-06 NOTE — PROGRESS NOTE ADULT - SUBJECTIVE AND OBJECTIVE BOX
[facility] DIVISION OF KIDNEY DISEASES AND HYPERTENSION -- FOLLOW UP NOTE  --------------------------------------------------------------------------------  Chief Complaint: oliguric MORAIMA in OLT patient    24 hour events/subjective: Pt. seen and examined at bedside earlier today in SICU with son present at bedside. Pt. remained on CRRT overnight but stopped in AM due to high filter pressures requiring circuit change. CRRT restarted later in AM and pt. given 1u prbcs. Pt. had been on phenylephrine overnight, stopped at 830 this AM. Pt. remains intubated.         PAST HISTORY  --------------------------------------------------------------------------------  No significant changes to PMH, PSH, FHx, SHx, unless otherwise noted    ALLERGIES & MEDICATIONS  --------------------------------------------------------------------------------  Allergies    No Known Allergies    Intolerances      Standing Inpatient Medications  albumin human 25% IVPB 50 milliLiter(s) IV Intermittent every 6 hours  buDESOnide    Inhalation Suspension 0.5 milliGRAM(s) Inhalation every 12 hours  caspofungin IVPB 50 milliGRAM(s) IV Intermittent every 24 hours  caspofungin IVPB      chlorhexidine 0.12% Liquid 15 milliLiter(s) Oral Mucosa every 12 hours  chlorhexidine 2% Cloths 1 Application(s) Topical <User Schedule>  CRRT Treatment    <Continuous>  cycloSPORINE  , modified (GENGRAF) Solution 75 milliGRAM(s) Oral <User Schedule>  diphenoxylate/atropine 2 Tablet(s) Oral every 6 hours  ganciclovir IVPB 250 milliGRAM(s) IV Intermittent every 24 hours  imipenem/cilastatin  IVPB 500 milliGRAM(s) IV Intermittent every 6 hours  insulin lispro (ADMELOG) corrective regimen sliding scale   SubCutaneous every 4 hours  loperamide Liquid 2 milliGRAM(s) Enteral Tube every 6 hours  methylPREDNISolone sodium succinate Injectable 32 milliGRAM(s) IV Push <User Schedule>  metoprolol tartrate 25 milliGRAM(s) Oral every 8 hours  metroNIDAZOLE  IVPB 500 milliGRAM(s) IV Intermittent every 8 hours  midodrine 10 milliGRAM(s) Oral every 8 hours  mupirocin 2% Ointment 1 Application(s) Topical every 12 hours  nystatin Powder 1 Application(s) Topical every 12 hours  pantoprazole  Injectable 40 milliGRAM(s) IV Push every 12 hours  phenylephrine    Infusion 0.1 MICROgram(s)/kG/Min IV Continuous <Continuous>  Phoxillum Filtration BK 4 / 2.5 5000 milliLiter(s) CRRT <Continuous>  Phoxillum Filtration BK 4 / 2.5 5000 milliLiter(s) CRRT <Continuous>  PrismaSATE Dialysate BGK 4 / 2.5 5000 milliLiter(s) CRRT <Continuous>  trimethoprim  40 mG/sulfamethoxazole 200 mG Suspension 80 milliGRAM(s) Enteral Tube daily  ursodiol Suspension 300 milliGRAM(s) Oral every 12 hours    PRN Inpatient Medications  albuterol/ipratropium for Nebulization 3 milliLiter(s) Nebulizer every 6 hours PRN  FIRST- Mouthwash  BLM 10 milliLiter(s) Swish and Spit every 8 hours PRN  oxyCODONE    Solution 5 milliGRAM(s) Oral every 4 hours PRN  oxyCODONE    Solution 2.5 milliGRAM(s) Oral every 4 hours PRN      REVIEW OF SYSTEMS  --------------------------------------------------------------------------------  Unable to obtain ROS due to current clinical status.     VITALS/PHYSICAL EXAM  --------------------------------------------------------------------------------  T(C): 36.8 (01-06-25 @ 11:00), Max: 37.6 (01-06-25 @ 03:00)  HR: 81 (01-06-25 @ 12:45) (69 - 108)  BP: 108/59 (01-06-25 @ 12:45) (84/45 - 143/63)  RR: 16 (01-06-25 @ 12:45) (15 - 36)  SpO2: 97% (01-06-25 @ 12:45) (90% - 100%)  Wt(kg): --        01-05-25 @ 07:01  -  01-06-25 @ 07:00  --------------------------------------------------------  IN: 5812.3 mL / OUT: 2934 mL / NET: 2878.3 mL    01-06-25 @ 07:01  -  01-06-25 @ 13:16  --------------------------------------------------------  IN: 832.5 mL / OUT: 1353 mL / NET: -520.5 mL      Physical Exam:  Gen: on vent via tracheostomy. opens eyes does not track.   HEENT: Icterus present, +NGT  Abdomen: + ileostomy with liquid brown stool, VAC dressing present   MSK: +LE edema   Skin: Superficial skin ulceration b/l thighs/dark necrotic appearing scrotum.   Vascular: Right IJ temporary dialysis catheter     LABS/STUDIES  --------------------------------------------------------------------------------              8.3    2.86  >-----------<  19       [01-06-25 @ 12:35]              24.9     140  |  106  |  41  ----------------------------<  208      [01-06-25 @ 12:35]  4.1   |  21  |  0.38        Ca     7.9     [01-06-25 @ 12:35]      Mg     2.3     [01-06-25 @ 12:35]      Phos  3.4     [01-06-25 @ 12:35]    TPro  4.1  /  Alb  2.9  /  TBili  15.0  /  DBili  x   /  AST  44  /  ALT  76  /  AlkPhos  84  [01-06-25 @ 12:35]    PT/INR: PT 28.0 , INR 2.48       [01-06-25 @ 12:35]  PTT: 48.1       [01-06-25 @ 12:35]      Creatinine Trend:  SCr 0.38 [01-06 @ 12:35]  SCr 0.41 [01-06 @ 06:17]  SCr 0.49 [01-06 @ 00:25]  SCr 0.53 [01-05 @ 18:00]  SCr 0.49 [01-05 @ 12:07]      Urinalysis - [01-06-25 @ 12:35]      Color  / Appearance  / SG  / pH       Gluc 208 / Ketone   / Bili  / Urobili        Blood  / Protein  / Leuk Est  / Nitrite       RBC  / WBC  / Hyaline  / Gran  / Sq Epi  / Non Sq Epi  / Bacteria       Vitamin D (25OH) <6.0      [11-21-24 @ 08:32]  TSH 0.68      [12-12-24 @ 12:27]  Lipid: chol 114, , HDL 47, LDL --      [04-24-24 @ 06:48]          IMAGING/RADIOLOGY: Reviewed.

## 2025-01-06 NOTE — ADVANCED PRACTICE NURSE CONSULT - ASSESSMENT
Seen with PT wound care Sharon Morgan for routine vac dressing  and complete ostomy pouching system changes. (see PT wound note for details). Chart reviewed & events noted. No family member at bedside. Pt in bed, sleeping most of the visit, tolerated procedure well. Complete pouching system changed. Stoma 1 5/8" with known grayish adherent slough remains at 9 o'clock. Stoma is maroon red in middle, slough continue to loosen, stoma is viable. No active bleeding noted during visit. Stoma is functioning for reddish -brown drainage, Peristomal skin and mucocutaneous junction intact. Noted dried blood vs adherent brown slough at mucocutaneous junction from 6- 9 o'clock, known + creases/skin indents at 3 and 9 o'clock. Dusted the peristomal skin with stoma powder excess removed.  Dab in with Cavilon 3M no sting barrier liquid film.  Re pouched w/ 2 1/4" flat skin barrier.  Bead of stoma paste applied to skin creases to level the surface and at the back of skin barrier near opening to caulk & high output pouch re- attached to bedside drainage bag. Patient  tolerated procedure well. Supplies and pattern  left at the bedside.  Will continue to follow up.

## 2025-01-06 NOTE — PROGRESS NOTE ADULT - ASSESSMENT
74 male w/ a PMHx of HTN, DM, AF, BPH, MASH cirrhosis and HCC s/p OLT on 11/15. Case was uneventful but post-op course has been prolonged and c/b delirium, aspiration, multiple episodes of acute hypoxic respiratory failure requiring reintubation from 11/20-11/24 & 11/24-11/26, AF w/ RVR, ileus requiring NGT, distended colon requiring rectal tube, dysphagia, malnutrition, hypernatremia, fevers, pancytopenia, poorly controlled glucoses, and left brachial VTE. Patient went into severe refractory septic shock on 12/6 w/ blood cultures positive for E. coli s/p s/p ex lap, total abd colectomy, end ileostomy, 3 intentionally retained laparotomy pads for bleeding, Abthera, IABP placement w/ admission to CTICU, Patient required inotropes, Jacqui, and CRRT post-op. s/p closure 12/9. IABP removed 12/10 and transferred back to SICU 12/13. SICU course c/b narrow complex arrythmia w/ ECG showing new ST elevations concerning for posterior wall MI vs vasospasms, cards consulted and started on heparin gtt for empiric ACS tx which was c/b GIB then transitioned to argatroban c/b bleed. TTE revealed LVOT obstruction & TODD.     PLAN:  Neuro:  - Pain: PRN oxy solution  - Opens eyes intermittently, intermittently following commands, off sedation  - CT head 11/19, 11/21, 11/25, 11/29, 12/5 negative  - EEG: Mild diffuse cerebral dysfunction that is not specific in etiology. No epileptic discharges recorded. No seizures recorded.  - Holding home xanax, Abilify, Zoloft, Remeron, Lexapro  - CT head 12/20 showing age-indeterminate infarct, f/u Neurology recs  - No need for MRI     Resp:  - S/p bedside tracheostomy 12/17  - PRVC 14/470/6/30  - trach collar daily, full support overnight  - c/w inhaled bronchodilator  - c/w suctioning PRN    CV:  - Aldo  - 250 alb x2  - 10mg midodrine q8  - home metoprolol 25 q8  - IABP removed 12/10  - TTE 12/6 w/ LVOT obstruction & TODD  - TTE 12/11 shows EF of 55-60%    GI:  - trend LFTs  - NPO, NGT w/ TFs  - lomotil 2 tabs QID,   - protonix BID  - monitor ALYSSA output    /Renal:  - CRRT held 1/4 w plans for possible diuretic challenge  - Monitor BUN/Cr, I&Os, trend UOP  - Monitor scrotal necrosis, maintain nichols at all times  - Bladder scan q12    Heme:  - trend H/H, PLTs, coags  - hep gtt and argatroban gtt held i/s/o bleed  - vitK 5 PO for 3 days (1/3-1/5)  - PLEX started 1/3, last 1/5  - 2 PRBC, 1 plt today    ID:  - ABX: caspo, jeniffer, flagyl, imepenem, flagyl  - transplant ppx w/ bactrim  - Ganciclovir for CMV   - immunosuppression w/ cyclosporine  - 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

## 2025-01-06 NOTE — PROGRESS NOTE ADULT - SUBJECTIVE AND OBJECTIVE BOX
Transplant ID follow up     Afebrile  Repeat Cx NGTD  hypotension on  given albumin on phenylephrine+ plasmapheresis  now off phenylephrine    Repeat BC sent NGTd  improved hemodynamics today , no Afib this am  non responsive  (was more awake on Saturday)    Vital Signs Last 24 Hrs  T(C): 36.8 (2025 11:00), Max: 37.6 (2025 03:00)  T(F): 98.2 (2025 11:00), Max: 99.7 (2025 03:00)  HR: 79 (2025 12:15) (78 - 108)  BP: 115/57 (2025 12:15) (84/45 - 143/63)  BP(mean): 82 (2025 12:15) (59 - 91)  RR: 16 (2025 12:15) (15 - 36)  SpO2: 98% (2025 12:15) (90% - 100%)    Parameters below as of 2025 11:00  Patient On (Oxygen Delivery Method): ventilator    O2 Concentration (%): 30      EXAM:  General: Patient in no acute distress, intubated   CV: S1+S2, no m/r/g appreciated   Lungs: No respiratory distress, CTAB  Abd: Soft, no guarding, no rebound tenderness,  wound vac in place  distended  Ext: No cyanosis, no edema  Neuro: Intubated   Skin: No rash   IV: No phlebitis  scrotal necrosis        ____________________________________________________  ROS  GENERAL: denies chills, , night sweats, weight loss.   PSYCH: denies depression, anxiety, suicidal ideation, hallucination, and delusions  SKIN: no rash or lesions; no color changes, no abnormal nevi,no  dryness, and nojaundice    EYES: denies visual changes, floaters, pain, inflammation, blurred vision, and discharge  ENT: denies tinnitus, vertigo, epistaxis, oral lesion, and decreased acuity  PULM: denies, hemoptysis, pleurisy  CVS: denies angina, palpitations,+ orthopnea, no syncope, or heart murmur  GI: denies constipation, diarrhea, melena, abdominal pain, nausea.   : denies dysuria, frequency, discharge, incontinence, stones or macroscopic hematuria  MS: no arthralgias, no erythema or swelling, no myalgias, noedema, or lower back pain.   CNS: denies numbness, dizziness, seizure, or tremor  ENDO: denies heat/cold intolerance, polyuria, polydipsia, malaise.    HEME: denies bruising, bleeding, lymphadenopathy, anemia, and calf pain    Allergies  No Known Allergies    __________________________________________________  MEDS:  MEDICATIONS  (STANDING):  albuterol/ipratropium for Nebulization 3 every 6 hours PRN  buDESOnide    Inhalation Suspension 0.5 every 12 hours  cycloSPORINE  , modified (GENGRAF) Solution 75 <User Schedule>  diphenoxylate/atropine 2 every 6 hours  insulin lispro (ADMELOG) corrective regimen sliding scale  every 4 hours  loperamide Liquid 2 every 6 hours  methylPREDNISolone sodium succinate Injectable 32 <User Schedule>  metoprolol tartrate 25 every 8 hours  midodrine 10 every 8 hours  oxyCODONE    Solution 5 every 4 hours PRN  oxyCODONE    Solution 2.5 every 4 hours PRN  pantoprazole  Injectable 40 every 12 hours  phenylephrine    Infusion 0.1 <Continuous>  ursodiol Suspension 300 every 12 hours    _________________________________________________  ANTIMICOBIALS  caspofungin IVPB    caspofungin IVPB 50 every 24 hours  cycloSPORINE  , modified (GENGRAF) Solution 75 <User Schedule>  ganciclovir IVPB 250 every 24 hours  imipenem/cilastatin  IVPB 500 every 6 hours  metroNIDAZOLE  IVPB 500 every 8 hours  trimethoprim  40 mG/sulfamethoxazole 200 mG Suspension 80 daily      GENERAL LABS              8.8                  141  | 20   | 43           5.11  >-----------< 14      ------------------------< 220                   25.8                 4.0  | 107  | 0.41                                         Ca 8.4   Mg 2.2   Ph 3.7      Vancomycin Level, Random: <4.0 ( @ 06:19)  Vancomycin Level, Random: 9.0 ( @ 05:52)  Vancomycin Level, Random: 15.0 ( @ 06:24)  Vancomycin Level, Random: 9.3 ( @ 06:10)  Vancomycin Level, Random: 16.6 ( @ 06:25)  Vancomycin Level, Random: 16.3 ( @ 06:15)  Vancomycin Level, Random: 11.4 ( @ 06:17)  Vancomycin Level, Random: 4.4 ( @ 06:30)  Vancomycin Level, Random: 8.5 ( @ 06:25)    Urinalysis Basic - ( 2025 06:17 )    Color: x / Appearance: x / SG: x / pH: x  Gluc: 220 mg/dL / Ketone: x  / Bili: x / Urobili: x   Blood: x / Protein: x / Nitrite: x   Leuk Esterase: x / RBC: x / WBC x   Sq Epi: x / Non Sq Epi: x / Bacteria: x        _________________________________________________  MICROBIOLOGY  -----------    Culture - Blood (collected 2025 05:00)  Source: .Blood BLOOD  Preliminary Report (2025 09:01):    No growth at 24 hours    Culture - Blood (collected 2025 02:20)  Source: .Blood BLOOD  Preliminary Report (2025 09:01):    No growth at 24 hours    Culture - Blood (collected 2025 00:30)  Source: .Blood BLOOD  Preliminary Report (2025 05:00):    No growth at 4 days            CMVPCR Lo.73 Wir08MJ/mL ( @ 13:36)    Clostridium difficile GDH Toxins A&amp;B, EIA:   Negative (25 @ 12:59)  Clostridium difficile GDH Interpretation: Negative for toxigenic C. Difficile.  This specimen is negative for C.  Difficile glutamate dehydrogenase (GDH) antigen and negative for C.  Difficile Toxins A & B, by EIA.  GDH is a highly sensitive screening  marker for C. Difficile that is produced in large amounts by all C.  Difficile strains, both toxigenic and nontoxigenic.  This assay has not  been validated as a test of cure.  Repeat testing during the same episode  of diarrhea is of limited value and is discouraged.  The results of this  assay should always be interpreted in conjunction with patient's clinical  history. (25 @ 12:59)        Fungitell:   _______________________________________________  PERTINENT IMAGING

## 2025-01-06 NOTE — PROGRESS NOTE ADULT - SUBJECTIVE AND OBJECTIVE BOX
24 HOUR EVENTS:  - inc PEEP  - 250x2 albumin for kassandra req  - PLEX  - 2 PRBC, 1 plt  - tobra x1    NEURO  RASS (if intubated): 		CAM ICU (if concern for delirium):  Exam: AOx0, lethargic, follows x4  Meds: oxyCODONE    Solution 5 milliGRAM(s) Oral every 4 hours PRN Severe Pain (7 - 10)  oxyCODONE    Solution 2.5 milliGRAM(s) Oral every 4 hours PRN Moderate Pain (4 - 6)      RESPIRATORY  RR: 17 (01-06-25 @ 01:30) (7 - 36)  SpO2: 97% (01-06-25 @ 01:30) (85% - 100%)  Wt(kg): --  Exam: Lungs CTA b/l  Mechanical Ventilation: Mode: AC/ CMV (Assist Control/ Continuous Mandatory Ventilation), RR (machine): 14, RR (patient): 23, TV (machine): 470, FiO2: 30, PEEP: 6, ITime: 0.9, MAP: 9.3, PIP: 19  ABG - ( 05 Jan 2025 05:25 )  pH: 7.36  /  pCO2: 34    /  pO2: 100   / HCO3: 19    / Base Excess: -5.6  /  SaO2: 98.7    Lactate: x                Meds: albuterol/ipratropium for Nebulization 3 milliLiter(s) Nebulizer every 6 hours PRN Shortness of Breath and/or Wheezing  buDESOnide    Inhalation Suspension 0.5 milliGRAM(s) Inhalation every 12 hours      CARDIOVASCULAR  HR: 95 (01-06-25 @ 01:30) (88 - 110)  BP: 96/54 (01-06-25 @ 01:30) (79/42 - 143/63)  BP(mean): 72 (01-06-25 @ 01:30) (54 - 91)  ABP: --  ABP(mean): --  Wt(kg): --  CVP(cm H2O): --  VBG - ( 06 Jan 2025 00:17 )  pH: 7.33  /  pCO2: 42    /  pO2: 40    / HCO3: 22    / Base Excess: -3.6  /  SaO2: 73.0   Lactate: 3.2                Exam: Normal S1/S2 w/o murmurs or rubs  Cardiac Rhythm: sinus  Perfusion     [ x]Adequate   [ ]Inadequate  Mentation   [ ]Normal       [ x]Reduced  Extremities  [x ]Warm         [ ]Cool  Volume Status [ ]Hypervolemic [ x]Euvolemic [ ]Hypovolemic  Meds: metoprolol tartrate 25 milliGRAM(s) Oral every 8 hours  midodrine 10 milliGRAM(s) Oral every 8 hours  phenylephrine    Infusion 0.1 MICROgram(s)/kG/Min IV Continuous <Continuous>      GI/NUTRITION  Exam: abd mildly distended, non TTP, ostomy with dusky patches/sloughing  Diet: tube feed  Last Bowel Movement: 05-Jan-2025 (01-05-25 @ 19:00)  Last Bowel Movement: 05-Jan-2025 (01-05-25 @ 07:00)  Last Bowel Movement: 04-Jan-2025 (01-04-25 @ 19:00)  Last Bowel Movement: 04-Jan-2025 (01-04-25 @ 07:00)  Last Bowel Movement: 03-Jan-2025 (01-03-25 @ 19:00)  Last Bowel Movement: 02-Jan-2025 (01-02-25 @ 19:00)  Last Bowel Movement: 02-Jan-2025 (01-02-25 @ 07:00)      Meds: diphenoxylate/atropine 2 Tablet(s) Oral every 6 hours  loperamide Liquid 2 milliGRAM(s) Enteral Tube every 6 hours  pantoprazole  Injectable 40 milliGRAM(s) IV Push every 12 hours  ursodiol Suspension 300 milliGRAM(s) Oral every 12 hours      GENITOURINARY  I&O's Detail    01-04 @ 07:01  -  01-05 @ 07:00  --------------------------------------------------------  IN:    Albumin 5%  - 500 mL: 750 mL    Cryoprecipitate: 75 mL    Enteral Tube Flush: 190 mL    Insulin: 61 mL    IV PiggyBack: 600 mL    IV PiggyBack: 250 mL    IV PiggyBack: 600 mL    Phenylephrine: 59.8 mL    Platelets - Single Donor: 450 mL    Vital1.5: 1300 mL  Total IN: 4335.8 mL    OUT:    Bulb (mL): 1175 mL    Bulb (mL): 5 mL    Ileostomy (mL): 1600 mL    Indwelling Catheter - Urethral (mL): 12 mL    Other (mL): 342 mL    VAC (Vacuum Assisted Closure) System (mL): 0 mL  Total OUT: 3134 mL    Total NET: 1201.8 mL      01-05 @ 07:01  -  01-06 @ 01:42  --------------------------------------------------------  IN:    Albumin 25%  -  50 mL: 100 mL    Albumin 5%  - 250 mL: 250 mL    Albumin 5%  - 500 mL: 50 mL    Enteral Tube Flush: 620 mL    IV PiggyBack: 650 mL    IV PiggyBack: 300 mL    IV PiggyBack: 400 mL    Phenylephrine: 131 mL    Phenylephrine: 78.6 mL    Platelets - Single Donor: 225 mL    PRBCs (Packed Red Blood Cells): 600 mL    Vital1.5: 845 mL  Total IN: 4249.6 mL    OUT:    Bulb (mL): 475 mL    Bulb (mL): 25 mL    Ileostomy (mL): 1150 mL    Indwelling Catheter - Urethral (mL): 15 mL    Insulin: 0 mL    Other (mL): 326 mL    VAC (Vacuum Assisted Closure) System (mL): 0 mL  Total OUT: 1991 mL    Total NET: 2258.6 mL          01-06    141  |  105  |  48[H]  ----------------------------<  223[H]  4.0   |  21[L]  |  0.49[L]    Ca    8.5      06 Jan 2025 00:25  Phos  3.9     01-06  Mg     2.2     01-06    TPro  4.4[L]  /  Alb  3.0[L]  /  TBili  13.4[H]  /  DBili  x   /  AST  44[H]  /  ALT  74[H]  /  AlkPhos  84  01-06    Meds: albumin human 25% IVPB 50 milliLiter(s) IV Intermittent every 6 hours      HEMATOLOGIC  Meds:                         8.9    5.89  )-----------( 22       ( 06 Jan 2025 00:25 )             26.4     PT/INR - ( 06 Jan 2025 00:25 )   PT: 20.2 sec;   INR: 1.77 ratio         PTT - ( 06 Jan 2025 00:25 )  PTT:35.2 sec    INFECTIOUS DISEASES  T(C): 37.1 (01-05-25 @ 23:00), Max: 37.6 (01-05-25 @ 03:00)  Wt(kg): --  WBC Count: 5.89 K/uL (01-06 @ 00:25)  WBC Count: 3.68 K/uL (01-05 @ 21:49)  WBC Count: 3.30 K/uL (01-05 @ 18:00)  WBC Count: 5.12 K/uL (01-05 @ 12:07)  WBC Count: 4.77 K/uL (01-05 @ 05:46)    Recent Cultures:  Specimen Source: .Blood BLOOD, 01-02 @ 00:30; Results   No growth at 72 Hours; Gram Stain: --; Organism: --  Specimen Source: .Blood BLOOD, 01-01 @ 01:23; Results   No growth at 72 Hours; Gram Stain: --; Organism: --    Meds: caspofungin IVPB 50 milliGRAM(s) IV Intermittent every 24 hours  caspofungin IVPB      cycloSPORINE  , modified (GENGRAF) Solution 75 milliGRAM(s) Oral <User Schedule>  cycloSPORINE  , modified (GENGRAF) Solution 50 milliGRAM(s) Oral <User Schedule>  ganciclovir IVPB 250 milliGRAM(s) IV Intermittent every 24 hours  imipenem/cilastatin  IVPB 500 milliGRAM(s) IV Intermittent every 6 hours  metroNIDAZOLE  IVPB 500 milliGRAM(s) IV Intermittent every 8 hours  trimethoprim  40 mG/sulfamethoxazole 200 mG Suspension 80 milliGRAM(s) Enteral Tube daily      ENDOCRINE  Capillary Blood Glucose    Meds: insulin lispro (ADMELOG) corrective regimen sliding scale   SubCutaneous every 4 hours  methylPREDNISolone sodium succinate Injectable 32 milliGRAM(s) IV Push <User Schedule>      ACCESS DEVICES:  [ ] Peripheral IV  [ ] Central Venous Line		[ ] R	[ ] L	[ ] IJ	[ ] Fem	[ ] SC	Placed:   [ ] Arterial Line			[ ] R	[ ] L	[ ] Fem	[ ] Rad	[ ] Ax	Placed:   [ ] PICC:					[ ] Mediport  [ ] Urinary Catheter, Date Placed:   [ ] Necessity of urinary, arterial, and venous catheters discussed    OTHER MEDICATIONS:  chlorhexidine 0.12% Liquid 15 milliLiter(s) Oral Mucosa every 12 hours  chlorhexidine 2% Cloths 1 Application(s) Topical <User Schedule>  CRRT Treatment    <Continuous>  FIRST- Mouthwash  BLM 10 milliLiter(s) Swish and Spit every 8 hours PRN  mupirocin 2% Ointment 1 Application(s) Topical every 12 hours  nystatin Powder 1 Application(s) Topical every 12 hours  Phoxillum Filtration BK 4 / 2.5 5000 milliLiter(s) CRRT <Continuous>  Phoxillum Filtration BK 4 / 2.5 5000 milliLiter(s) CRRT <Continuous>  PrismaSATE Dialysate BGK 4 / 2.5 5000 milliLiter(s) CRRT <Continuous>      IMAGING:

## 2025-01-06 NOTE — PROGRESS NOTE ADULT - ASSESSMENT
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   s/p OLT 11/15 with complicated post op course    [] Septic shock/Cardiogenic shock  [] s/p Colectomy 12/7   [] RTOR for closure and Ileostomy creation   [] IAPB 12/7- removed 12/10  -> E Coli Bacteremia 12/6  -> Ec faecalis UTI (12/16)  -> Stenotrophaonas in trach apirate (12/19)  -> Ec Faecalis in Asc fluid (12/20) (12/26)  -> Clostridium paraputrifucum in blood 12/23, cultures have since cleared  - Tx ID following - on Imipenem/Caspo/Flagyl/Bactrim DS   - 1/1 CT CAP: small pleural effusions, enteritis, rest ok  - all lines changed over 1/3  - Wound care for sacral decubitus ulcer    [] MORAIMA:   -CRRT started 12/7, stopped 12/15, resumed CRRT 12/23    [ ] UGIB s/p EGD (12/26) showing generalized oozing, coagulopathy  - Correct coagulopathy per SICU  - Protonix IV BID  - TF at goal  - Vitamin K 3 days ends 1/5/25    [] s/p OLT  - poor graft function, trial of plasmapheresis 1/3 for (3-5 days)  - repeat liver US unchanged  - Diet: TFs to goal  - Pain management: avoid narcotics  - Strict I&Os, wound vac placed 12/10   - US: L brachial DVT (holding A/C)  - wound vac care  - ursodiol 300mgBID     [ ] Immunosuppression  - Tacrolimus stopped 11/18 in setting of AMS/Seizure, Started Cyclo 12/17  - Cyclo per level, MMF HELD, Solumedrol 32 mg daily    [] lleus -- resolving  -@ home on Linzess  - imaging with distended colon, improving, (likely opioid induced)  - s/p Neostigmine x 2  - s/p Relistor, last dose 12/1  - s/p colectomy with end ileostomy  (see above)  - ileostomy with >1L output, lomotil and imodium as needed, and change tube feeds  - Colorectal following  - replace losses as able    [] CMV viremia  - CMV PCR (12/11 and 12/16): neg, positive CMV PCR on 12/23  - PPx: Gancyclovir (HELD) in setting of thrombocytopenia initially due to low PLT  - CMV viral load 537 12/31, will attempt Ganciclovir again, next check 1/7    [ ] AMS  - Reintubated 11/20 Aspiration/hypoxia, extubated 11/24->[id number]/24--> extubated 11/26 -> [id number]/7 -> tracheostomy 12/17 -> trach collar trials starting 12/29  - EEG 11/30 negative, Neurology following  - BH following   - off tacro  - on keppra  -CT Head No Cont (12/20): Age-indeterminate posterior left frontal lobe infarct.   -CT Head (12/25): Evolving subacute infarct in the left supramarginal gyrus  -repeat CTH ok, poor MS    [ ] HTN/ pAFib  [ ] coronary vasospasm vs posterior wall MI  - remains off all a/c, failed hep gtt/argatroban due to UGIB  - Cards- plan for PCI prior to DC   - Off Amiodarone, off dobutamine  - BB as needed  - Dr. Quintanilla following    [ ] DM  - per SICU     [] Scrotal abscess   - US (12/12): 2.1 x0.9 x 3.2 cm focal, right testicle- possible abscess (12/12)  - Urology recs: CT scrotum review no debridement required  - Urology recs Derm consult for guidance on wound care   - 12/23 US doppler scrotum: no arterial flow, limited venous flow, bl hydrocele  - 12/15 CT CAP no acute changes   - Elevate scrotum w/ support Urology signed off 12/29

## 2025-01-06 NOTE — ADVANCED PRACTICE NURSE CONSULT - RECOMMEDATIONS
Will recommend:  1. Monitor output.  2. Empty pouch when 1/3-1/2 full   3. Change pouching system every 3-4 days & prn leakage  4. Contact ostomy specialists if questions, concerns/issues .  5. Supplies: [place] 2 1/4" Ceraplus flat skin barrier (#[id number]), Lianne 2 1/4" drainable pouch (#[id number]); Accessory products:  stoma paste #[id number], stoma powder (#4422) & Cavilon No sting barrier film wipe (#2689)  Will f/u for ostomy education when appropriate. Pt remains acute requiring SICU care.

## 2025-01-07 NOTE — BH CONSULTATION LIAISON PROGRESS NOTE - NSBHCONSULTFOLLOWAFTERCARE_PSY_A_CORE FT
C/W psychiatry with Dr. Ratliff 

## 2025-01-07 NOTE — CHART NOTE - NSCHARTNOTEFT_GEN_A_CORE
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 Post-op course c/b worsening mental status and acute hypoxic respiratory failure requiring multiple intubations/extubations, most recently on 12/7 with conversion to tracheostomy on 12/17, e.coli bacteremia with RTOR on 12/7 for concern for worsening septic shock and cardiogenic shock s/p balloon pump placement and total abdominal colectomy in setting of ischemic bowel s/p OR on 12/9 for ileostomy creation, and oligouric MORAIMA requiring CRRT and now intermittent HD. Patient previously had plasma exchange from 01/03/25 to 01/05/25, patient tolerated the procedure well. In view of rising billirubin (20.1 as of 01/07/25) transplant team contacted transfusion medicine to have additional TPE procedures. A course of 3 TPE over 3 days is planned, starting 01/07/25.    - PLEX#1: Scheduled on 01/07/25. 1 Plasma volume of the patient being replaced 100% by donor plasma.

## 2025-01-07 NOTE — BH CONSULTATION LIAISON PROGRESS NOTE - NSBHCHARTREVIEWVS_PSY_A_CORE FT
Vital Signs Last 24 Hrs  T(C): 36.5 (27 Nov 2024 11:00), Max: 36.9 (26 Nov 2024 23:00)  T(F): 97.7 (27 Nov 2024 11:00), Max: 98.5 (26 Nov 2024 23:00)  HR: 86 (27 Nov 2024 14:00) (80 - 130)  BP: 178/70 (27 Nov 2024 14:00) (106/53 - 198/84)  BP(mean): 101 (27 Nov 2024 14:00) (71 - 120)  RR: 24 (27 Nov 2024 14:00) (18 - 37)  SpO2: 96% (27 Nov 2024 14:00) (95% - 100%)    Parameters below as of 27 Nov 2024 11:00  Patient On (Oxygen Delivery Method): room air    
Vital Signs Last 24 Hrs  T(C): 37.4 (29 Nov 2024 11:00), Max: 37.4 (29 Nov 2024 11:00)  T(F): 99.3 (29 Nov 2024 11:00), Max: 99.3 (29 Nov 2024 11:00)  HR: 112 (29 Nov 2024 12:00) (85 - 117)  BP: 125/70 (29 Nov 2024 12:00) (105/64 - 136/62)  BP(mean): 93 (29 Nov 2024 12:00) (76 - 93)  RR: 27 (29 Nov 2024 12:00) (11 - 31)  SpO2: 95% (29 Nov 2024 12:00) (93% - 100%)    Parameters below as of 29 Nov 2024 11:02  Patient On (Oxygen Delivery Method): room air    
Vital Signs Last 24 Hrs  T(C): 36.7 (05 Dec 2024 07:00), Max: 36.8 (05 Dec 2024 03:00)  T(F): 98 (05 Dec 2024 07:00), Max: 98.2 (05 Dec 2024 03:00)  HR: 112 (05 Dec 2024 09:05) (94 - 124)  BP: 99/55 (05 Dec 2024 09:05) (91/55 - 152/97)  BP(mean): 72 (05 Dec 2024 09:05) (66 - 120)  RR: 31 (05 Dec 2024 09:05) (24 - 38)  SpO2: 94% (05 Dec 2024 09:05) (94% - 100%)    Parameters below as of 05 Dec 2024 07:00  Patient On (Oxygen Delivery Method): room air    
Vital Signs Last 24 Hrs  T(C): 37.3 (21 Nov 2024 07:00), Max: 37.3 (21 Nov 2024 07:00)  T(F): 99.1 (21 Nov 2024 07:00), Max: 99.1 (21 Nov 2024 07:00)  HR: 110 (21 Nov 2024 11:36) (94 - 123)  BP: 112/53 (21 Nov 2024 10:00) (75/47 - 131/60)  BP(mean): 76 (21 Nov 2024 10:00) (58 - 646)  RR: 24 (21 Nov 2024 10:00) (16 - 33)  SpO2: 95% (21 Nov 2024 11:36) (88% - 100%)    Parameters below as of 21 Nov 2024 11:36  Patient On (Oxygen Delivery Method): ventilator    
Vital Signs Last 24 Hrs  T(C): 36.7 (19 Nov 2024 11:00), Max: 37.6 (19 Nov 2024 10:15)  T(F): 98.1 (19 Nov 2024 11:00), Max: 99.6 (19 Nov 2024 10:15)  HR: 111 (19 Nov 2024 13:30) (75 - 148)  BP: 121/57 (19 Nov 2024 13:30) (102/59 - 187/83)  BP(mean): 82 (19 Nov 2024 13:30) (72 - 118)  RR: 31 (19 Nov 2024 13:30) (17 - 37)  SpO2: 100% (19 Nov 2024 13:30) (92% - 100%)    Parameters below as of 19 Nov 2024 07:00  Patient On (Oxygen Delivery Method): nasal cannula  O2 Flow (L/min): 2  
Vital Signs Last 24 Hrs  T(C): 36.4 (02 Dec 2024 11:00), Max: 37 (01 Dec 2024 19:00)  T(F): 97.6 (02 Dec 2024 11:00), Max: 98.6 (01 Dec 2024 19:00)  HR: 107 (02 Dec 2024 14:00) (83 - 117)  BP: 118/62 (02 Dec 2024 14:00) (92/61 - 131/65)  BP(mean): 84 (02 Dec 2024 14:00) (71 - 96)  RR: 14 (02 Dec 2024 14:00) (11 - 25)  SpO2: 99% (02 Dec 2024 14:00) (96% - 100%)    Parameters below as of 02 Dec 2024 11:05  Patient On (Oxygen Delivery Method): room air    
Vital Signs Last 24 Hrs  T(C): 37.3 (07 Jan 2025 11:00), Max: 37.5 (07 Jan 2025 03:00)  T(F): 99.1 (07 Jan 2025 11:00), Max: 99.5 (07 Jan 2025 03:00)  HR: 83 (07 Jan 2025 16:00) (75 - 99)  BP: 115/56 (07 Jan 2025 16:00) (80/53 - 133/64)  BP(mean): 80 (07 Jan 2025 16:00) (61 - 95)  RR: 28 (07 Jan 2025 16:00) (16 - 31)  SpO2: 97% (07 Jan 2025 16:00) (87% - 100%)    Parameters below as of 07 Jan 2025 11:00  Patient On (Oxygen Delivery Method): ventilator    
Vital Signs Last 24 Hrs  T(C): 36.5 (27 Nov 2024 11:00), Max: 36.9 (26 Nov 2024 23:00)  T(F): 97.7 (27 Nov 2024 11:00), Max: 98.5 (26 Nov 2024 23:00)  HR: 106 (27 Nov 2024 13:00) (80 - 130)  BP: 198/84 (27 Nov 2024 13:00) (106/53 - 198/84)  BP(mean): 120 (27 Nov 2024 13:00) (71 - 120)  RR: 23 (27 Nov 2024 13:00) (18 - 37)  SpO2: 97% (27 Nov 2024 13:00) (95% - 100%)    Parameters below as of 27 Nov 2024 11:00  Patient On (Oxygen Delivery Method): room air

## 2025-01-07 NOTE — BH CONSULTATION LIAISON PROGRESS NOTE - NSBHATTESTTYPEVISIT_PSY_A_CORE
Attending evaluating patient with OSCAR ([id number] code)
Attending evaluating patient with OSCAR ([id number] code)
Attending Only
Attending evaluating patient with OSCAR ([id number] code)
Attending evaluating patient with OSCAR ([id number] code)
Attending evaluating patient with OSCAR ([id number] code)
On-site Attending supervising OSCAR ([id number] codes)

## 2025-01-07 NOTE — BH CONSULTATION LIAISON PROGRESS NOTE - LEVEL OF CONSCIOUSNESS
Alert
Alert
Obtunded
Lethargic, arousable to verbal stimulus
Alert
Lethargic, arousable to painful stimulus
Lethargic, arousable to verbal stimulus

## 2025-01-07 NOTE — BH CONSULTATION LIAISON PROGRESS NOTE - NSBHMSELANG_PSY_A_CORE
No abnormalities noted
Unable to assess
Unable to assess
No abnormalities noted
No abnormalities noted

## 2025-01-07 NOTE — PROGRESS NOTE ADULT - REASON FOR ADMISSION
Referred by: Carmen Thomas PA-C; Medical Diagnosis (from order):    Diagnosis Information      Diagnosis    V58.89, 847.2 (ICD-9-CM) - S39.012D (ICD-10-CM) - Strain of lumbar region, subsequent encounter                Physical Therapy -  Daily Treatment Note    Visit:  Visit count could not be calculated. Make sure you are using a visit which is associated with an episode.     SUBJECTIVE                                                                                                             Patient reports he went up [facility] and setting up Sunday was pretty sore.  Then he was able to relax and take it easy.  Since tues it's been staying at a low 1/10         OBJECTIVE                                                                                                                          TREATMENT                                                                                                                  Therapeutic Exercise:  Upright bike, seat 7, x 5 minutes resistance   Standing hamstring stretch 2x30 each leg  multi hip abduction plate 2 2x15  Standing with small squat, rows with cable nasir plate 6, 2x16    Therapeutic Activities   Lifting 25# crate from floor to waist x10, 35# x 5, 45# x 5, 50# x 5   carrying 25# crate walking 80 feet x 1  Pushing and pulling 50# on sled x 50 feet x 3  12 inch step ups no hands x15 each leg.  (needs to step onto platforms at work)    Manual Therapy:  In side lying: manual skills to the right lumbar muscles.    Therapeutic Activity:     Neuromuscular Re-Education:        Skilled input: verbal instruction/cues and as detailed above    Writer verbally educated and received verbal consent for hand placement, positioning of patient, and techniques to be performed today from patient for hand placement and palpation for techniques and clothing adjustments for techniques as described above and how they are pertinent to the patient's plan of care.    Home Exercise  Program: (*above indicates provided as part of home exercise program)  Access Code: [email or web address]   URL: [email or web address]   Date: 05/11/2020   Prepared by: Windy Andersen     Exercises  Supine Single Bent Knee Fallout - 10 reps - 1 sets - 2x daily - 7x weekly  Bridge - 15 reps - 2 sets - 5 hold - 2x daily - 7x weekly  Seated Hamstring Stretch - 5 reps - 1 sets - 30 hold - 2x daily - 7x weekly  Supine Piriformis Stretch with Foot on Ground - 3 reps - 1 sets - 30 hold - 2x daily - 7x weekly  Clamshell - 10 reps - 3 sets - 1x daily - 7x weekly  Supine Lower Trunk Rotation - 10 reps - 3 sets - 1x daily - 7x weekly  Supine March - 5 reps - 1-2 sets - 1-2x daily - 7x weekly  Squatting Shoulder Row with Anchored Resistance - 15 reps - 2 sets - 1-2x daily  Shoulder extension with resistance - Neutral - 15 reps - 2 sets - 1-2x daily  Cat-Camel - 5 reps - 1-2x daily  Child's Pose Stretch - 5 reps - 20 hold - 1-2x daily  Supine Chest Stretch on Foam Roll - 1x daily  Quadruped Alternating Arm Lift - 10 reps - 1x daily  Quadruped Leg Lifts - 10 reps - 1x daily  Patient Education  Standard Posture and Body Mechanics        ASSESSMENT                                                                                                             Patient had no tenderness or tightness today in his low back.  Pain was one notch higher after performing all of the above exercises.  He felt some pain in his low back with lifting the  Box with 50#.  He will get an MRI next week Wednesday.      Pain/symptoms after session: 2  Patient Education:   Results of above outlined education: Demonstrates understanding and Verbalizes understanding     PLAN                                                                                                                             Suggestions for next session as indicated: Progress per plan of care, work related lifting/tasks, strengthening       Procedures and total treatment time documented  Liver Transplant Time Entry flowsheet.

## 2025-01-07 NOTE — PROGRESS NOTE ADULT - ASSESSMENT
74 male w/ a PMHx of HTN, DM, AF, BPH, MASH cirrhosis and HCC s/p OLT on 11/15. Case was uneventful but post-op course has been prolonged and c/b delirium, aspiration, multiple episodes of acute hypoxic respiratory failure requiring reintubation from 11/20-11/24 & 11/24-11/26, AF w/ RVR, ileus requiring NGT, distended colon requiring rectal tube, dysphagia, malnutrition, hypernatremia, fevers, pancytopenia, poorly controlled glucoses, and left brachial VTE. Patient went into severe refractory septic shock on 12/6 w/ blood cultures positive for E. coli s/p s/p ex lap, total abd colectomy, end ileostomy, 3 intentionally retained laparotomy pads for bleeding, Abthera, IABP placement w/ admission to CTICU, Patient required inotropes, Jacqui, and CRRT post-op. s/p closure 12/9. IABP removed 12/10 and transferred back to SICU 12/13. SICU course c/b narrow complex arrythmia w/ ECG showing new ST elevations concerning for posterior wall MI vs vasospasms, cards consulted and started on heparin gtt for empiric ACS tx which was c/b GIB then transitioned to argatroban c/b bleed. TTE revealed LVOT obstruction & TODD.     PLAN:  Neuro:  - Pain: PRN oxy solution  - Opens eyes intermittently, intermittently following commands, off sedation  - CT head 11/19, 11/21, 11/25, 11/29, 12/5 negative  - EEG: Mild diffuse cerebral dysfunction that is not specific in etiology. No epileptic discharges recorded. No seizures recorded.  - Holding home xanax, Abilify, Zoloft, Remeron, Lexapro  - CT head 12/20 showing age-indeterminate infarct, f/u Neurology recs  - No need for MRI     Resp:  - S/p bedside tracheostomy 12/17  - PRVC 14/470/6/30  - trach collar daily, full support overnight  - c/w inhaled bronchodilator  - c/w suctioning PRN    CV:  - Aldo  - 10mg midodrine q8  - home metoprolol 25 q8  - IABP removed 12/10  - TTE 12/6 w/ LVOT obstruction & TODD  - TTE 12/11 shows EF of 55-60%    GI:  - trend LFTs  - NPO, NGT w/ TFs  - lomotil 2 tabs QID,   - protonix BID  - monitor ALYSSA output    /Renal:  - CRRT held 1/4 w plans for possible diuretic challenge  - Monitor BUN/Cr, I&Os, trend UOP  - Monitor scrotal necrosis, maintain nichols at all times  - Bladder scan q12    Heme:  - trend H/H, PLTs, coags  - hep gtt and argatroban gtt held i/s/o bleed  - vitK 5 PO for 3 days (1/3-1/5)  - PLEX started 1/3, last 1/5  - 2 plt, goal >20    ID:  - ABX: caspo, jeniffer, flagyl, imepenem, flagyl  - transplant ppx w/ bactrim  - Ganciclovir for CMV   - immunosuppression w/ cyclosporine  - 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

## 2025-01-07 NOTE — PROGRESS NOTE ADULT - NS ATTEND AMEND GEN_ALL_CORE FT
74 yr M w liver failure, s/p OLT in nov 24 now w trach 12/17, bacteremia, return to OR, total colectomy, ileostomy creation, cardiogenic shock, oliguric renal failure requiring CRRT    ON: no major changes    only opens eyes, arouses to voice, not following  brain imaging unreavling as the cause of poor mental status  melatonin qhs  CPAP 5/5 30%, TC 4-6 hours today   portex 7 trach  AM CXR persistent bibasilar pl effusion  kassandra 0.2   on home meto 25p q8hr  midodrine increase to 20 q8hrs  TTE 12/11 EF 55%  NPO  NG tube feeds at goals  lomotil, imodium, ostomy 1.4 lit, add banana flakes  LFTs worsening, despite 3 rounds of plasmapheresis , per transplant repeat today   bulb 1.4 lit output in total , wound vac  anuric, net balance 0.5 lit pos   albumin boluses q6hr  CRRT net even for now  hb stable, severe thrombocytopenia, requiring frequent transfusions  no chem VTE PPX  caspo, merop, flagyl, imipenem gancyclovir per ID transplant  combicath steno, bcx clostridium, e fecal from abd  afebrile overnight  glycemic control w ins nph  prednisone for immunosuppresion  family at bedside updated  PT when able    rt IJ HD, left IJ CVC 74 yr M w liver failure, s/p OLT in nov 24 now w trach 12/17, bacteremia, return to OR, total colectomy, ileostomy creation, cardiogenic shock, oliguric renal failure requiring CRRT    ON: no major changes    opened eyes and blinks but unable to move ext due to deconditioning.   melatonin qhs  CPAP 5/5 30%, TC 4-6 hours today   portex 7 trach  AM CXR persistent bibasilar pl effusion  kassandra 0.2   on home meto 25p q8hr  midodrine increase to 20 q8hrs  TTE 12/11 EF 55%  NPO  NG tube feeds at goals  lomotil, imodium, ostomy 1.4 lit, add banana flakes  LFTs worsening, despite 3 rounds of plasmapheresis , per transplant repeat today   bulb 1.4 lit output in total , wound vac  anuric, net balance 0.5 lit pos   albumin boluses q6hr  CRRT net even for now  hb stable, severe thrombocytopenia, requiring frequent transfusions  no chem VTE PPX  caspo, merop, flagyl, imipenem gancyclovir per ID transplant  combicath steno, bcx clostridium, e fecal from abd  afebrile overnight  glycemic control w ins nph  prednisone for immunosuppresion  family at bedside updated  PT when able    rt IJ HD, left IJ CVC

## 2025-01-07 NOTE — BH CONSULTATION LIAISON PROGRESS NOTE - CURRENT MEDICATION
MEDICATIONS  (STANDING):  albuterol/ipratropium for Nebulization 3 milliLiter(s) Nebulizer every 6 hours  aspirin  chewable 81 milliGRAM(s) Enteral Tube daily  atovaquone  Suspension 1500 milliGRAM(s) Oral daily  buDESOnide    Inhalation Suspension 0.5 milliGRAM(s) Inhalation two times a day  calcium carbonate 1250 mG  + Vitamin D (OsCal 500 + D) 1 Tablet(s) Oral every 12 hours  chlorhexidine 2% Cloths 1 Application(s) Topical <User Schedule>  cycloSPORINE  , modified (GENGRAF) Solution 50 milliGRAM(s) Enteral Tube <User Schedule>  digoxin  Injectable 125 MICROGram(s) IV Push daily  finasteride 5 milliGRAM(s) Oral daily  fluconAZOLE IVPB 400 milliGRAM(s) IV Intermittent every 24 hours  heparin   Injectable 5000 Unit(s) SubCutaneous every 12 hours  insulin lispro (ADMELOG) corrective regimen sliding scale   SubCutaneous every 6 hours  insulin NPH human recombinant 8 Unit(s) SubCutaneous every 6 hours  levETIRAcetam  Solution 250 milliGRAM(s) Oral every 12 hours  meropenem  IVPB 1000 milliGRAM(s) IV Intermittent every 8 hours  methylnaltrexone Injectable 12 milliGRAM(s) SubCutaneous once  metoprolol tartrate 50 milliGRAM(s) Enteral Tube every 8 hours  pantoprazole  Injectable 40 milliGRAM(s) IV Push daily  polyethylene glycol 3350 17 Gram(s) Oral every 24 hours  predniSONE   Tablet 20 milliGRAM(s) Oral daily  QUEtiapine 50 milliGRAM(s) Oral at bedtime  senna Syrup 10 milliLiter(s) Oral every 24 hours  ursodiol Suspension 300 milliGRAM(s) Oral every 12 hours  valGANciclovir 50 mG/mL Oral Solution 900 milliGRAM(s) Oral daily    MEDICATIONS  (PRN):  acetaminophen   IVPB .. 500 milliGRAM(s) IV Intermittent every 6 hours PRN Mild Pain (1 - 3)  bacitracin   Ointment 1 Application(s) Topical two times a day PRN abrasions  HYDROmorphone  Injectable 0.5 milliGRAM(s) IV Push every 4 hours PRN Breakthrough Pain  LORazepam     Tablet 0.5 milliGRAM(s) Oral every 8 hours PRN Agitation  oxyCODONE    Solution 5 milliGRAM(s) Enteral Tube every 4 hours PRN Moderate Pain (4 - 6)  oxyCODONE    Solution 10 milliGRAM(s) Enteral Tube every 4 hours PRN Severe Pain (7 - 10)  QUEtiapine 12.5 milliGRAM(s) Oral every 8 hours PRN agitation  
MEDICATIONS  (STANDING):  apixaban 5 milliGRAM(s) Oral every 12 hours  ascorbic acid 500 milliGRAM(s) Oral daily  aspirin  chewable 81 milliGRAM(s) Oral daily  atovaquone  Suspension 1500 milliGRAM(s) Oral daily  buDESOnide    Inhalation Suspension 0.5 milliGRAM(s) Inhalation two times a day  calcium carbonate 1250 mG  + Vitamin D (OsCal 500 + D) 1 Tablet(s) Oral every 12 hours  chlorhexidine 2% Cloths 1 Application(s) Topical <User Schedule>  cycloSPORINE  , modified (GENGRAF) Solution 75 milliGRAM(s) Oral <User Schedule>  cycloSPORINE  , modified (GENGRAF) Solution 50 milliGRAM(s) Oral <User Schedule>  digoxin  Injectable 125 MICROGram(s) IV Push daily  doxazosin 2 milliGRAM(s) Oral at bedtime  insulin lispro (ADMELOG) corrective regimen sliding scale   SubCutaneous three times a day before meals  insulin lispro (ADMELOG) corrective regimen sliding scale   SubCutaneous at bedtime  levETIRAcetam  IVPB 250 milliGRAM(s) IV Intermittent every 12 hours  lidocaine   4% Patch 1 Patch Transdermal every 24 hours  linaclotide 290 MICROGram(s) Oral daily  melatonin 5 milliGRAM(s) Oral at bedtime  metoprolol tartrate 50 milliGRAM(s) Oral every 8 hours  multivitamin 1 Tablet(s) Oral daily  mycophenolate mofetil Suspension 500 milliGRAM(s) Oral <User Schedule>  nystatin Powder 1 Application(s) Topical three times a day  pantoprazole  Injectable 40 milliGRAM(s) IV Push daily  potassium chloride   Powder 40 milliEquivalent(s) Oral once  potassium chloride  10 mEq/100 mL IVPB 10 milliEquivalent(s) IV Intermittent every 1 hour  predniSONE   Tablet 20 milliGRAM(s) Oral daily  QUEtiapine 50 milliGRAM(s) Oral at bedtime  sodium bicarbonate 1300 milliGRAM(s) Oral two times a day  valGANciclovir 50 mG/mL Oral Solution 900 milliGRAM(s) Oral daily    MEDICATIONS  (PRN):  acetaminophen     Tablet .. 500 milliGRAM(s) Oral every 6 hours PRN Mild Pain (1 - 3)  albuterol/ipratropium for Nebulization 3 milliLiter(s) Nebulizer every 6 hours PRN Shortness of Breath and/or Wheezing  bacitracin   Ointment 1 Application(s) Topical two times a day PRN abrasions  LORazepam   Injectable 0.5 milliGRAM(s) IV Push every 8 hours PRN Agitation  QUEtiapine 12.5 milliGRAM(s) Oral three times a day PRN anxiety  
MEDICATIONS  (STANDING):  aspirin  chewable 81 milliGRAM(s) Enteral Tube daily  buDESOnide    Inhalation Suspension 0.5 milliGRAM(s) Inhalation two times a day  calcium carbonate 1250 mG  + Vitamin D (OsCal 500 + D) 1 Tablet(s) Oral every 12 hours  chlorhexidine 2% Cloths 1 Application(s) Topical <User Schedule>  cycloSPORINE  , modified (GENGRAF) Solution 50 milliGRAM(s) Enteral Tube <User Schedule>  digoxin  Injectable 125 MICROGram(s) IV Push daily  finasteride 5 milliGRAM(s) Oral daily  fluconAZOLE IVPB 400 milliGRAM(s) IV Intermittent every 24 hours  heparin   Injectable 5000 Unit(s) SubCutaneous every 12 hours  insulin lispro (ADMELOG) corrective regimen sliding scale   SubCutaneous every 6 hours  insulin NPH human recombinant 10 Unit(s) SubCutaneous every 6 hours  levETIRAcetam  Solution 250 milliGRAM(s) Oral every 12 hours  meropenem  IVPB 1000 milliGRAM(s) IV Intermittent every 8 hours  metoprolol tartrate 50 milliGRAM(s) Enteral Tube every 8 hours  pantoprazole  Injectable 40 milliGRAM(s) IV Push daily  polyethylene glycol 3350 17 Gram(s) Oral every 24 hours  predniSONE   Tablet 20 milliGRAM(s) Oral daily  QUEtiapine 25 milliGRAM(s) Oral <User Schedule>  senna Syrup 10 milliLiter(s) Oral every 24 hours  ursodiol Suspension 300 milliGRAM(s) Oral every 12 hours  valGANciclovir 50 mG/mL Oral Solution 450 milliGRAM(s) Oral once    MEDICATIONS  (PRN):  acetaminophen   IVPB .. 500 milliGRAM(s) IV Intermittent every 6 hours PRN Mild Pain (1 - 3)  bacitracin   Ointment 1 Application(s) Topical two times a day PRN abrasions  HYDROmorphone  Injectable 1 milliGRAM(s) IV Push every 3 hours PRN Breakthrough Pain  oxyCODONE    Solution 5 milliGRAM(s) Enteral Tube every 4 hours PRN Moderate Pain (4 - 6)  oxyCODONE    Solution 10 milliGRAM(s) Enteral Tube every 4 hours PRN Severe Pain (7 - 10)  
MEDICATIONS  (STANDING):  aspirin  chewable 81 milliGRAM(s) Enteral Tube daily  buDESOnide    Inhalation Suspension 0.5 milliGRAM(s) Inhalation two times a day  calcium carbonate 1250 mG  + Vitamin D (OsCal 500 + D) 1 Tablet(s) Oral every 12 hours  chlorhexidine 2% Cloths 1 Application(s) Topical <User Schedule>  cycloSPORINE  , modified (GENGRAF) Solution 50 milliGRAM(s) Enteral Tube <User Schedule>  digoxin  Injectable 125 MICROGram(s) IV Push daily  finasteride 5 milliGRAM(s) Oral daily  fluconAZOLE IVPB 400 milliGRAM(s) IV Intermittent every 24 hours  heparin   Injectable 5000 Unit(s) SubCutaneous every 12 hours  insulin lispro (ADMELOG) corrective regimen sliding scale   SubCutaneous every 6 hours  insulin NPH human recombinant 10 Unit(s) SubCutaneous every 6 hours  levETIRAcetam  Solution 250 milliGRAM(s) Oral every 12 hours  meropenem  IVPB 1000 milliGRAM(s) IV Intermittent every 8 hours  metoprolol tartrate 50 milliGRAM(s) Enteral Tube every 8 hours  pantoprazole  Injectable 40 milliGRAM(s) IV Push daily  polyethylene glycol 3350 17 Gram(s) Oral every 24 hours  predniSONE   Tablet 20 milliGRAM(s) Oral daily  QUEtiapine 25 milliGRAM(s) Oral <User Schedule>  senna Syrup 10 milliLiter(s) Oral every 24 hours  ursodiol Suspension 300 milliGRAM(s) Oral every 12 hours  valGANciclovir 50 mG/mL Oral Solution 450 milliGRAM(s) Oral once    MEDICATIONS  (PRN):  acetaminophen   IVPB .. 500 milliGRAM(s) IV Intermittent every 6 hours PRN Mild Pain (1 - 3)  bacitracin   Ointment 1 Application(s) Topical two times a day PRN abrasions  HYDROmorphone  Injectable 1 milliGRAM(s) IV Push every 3 hours PRN Breakthrough Pain  oxyCODONE    Solution 5 milliGRAM(s) Enteral Tube every 4 hours PRN Moderate Pain (4 - 6)  oxyCODONE    Solution 10 milliGRAM(s) Enteral Tube every 4 hours PRN Severe Pain (7 - 10)  
MEDICATIONS  (STANDING):  albumin human 25% IVPB 50 milliLiter(s) IV Intermittent every 6 hours  buDESOnide    Inhalation Suspension 0.5 milliGRAM(s) Inhalation every 12 hours  caspofungin IVPB      caspofungin IVPB 50 milliGRAM(s) IV Intermittent every 24 hours  chlorhexidine 0.12% Liquid 15 milliLiter(s) Oral Mucosa every 12 hours  chlorhexidine 2% Cloths 1 Application(s) Topical <User Schedule>  CRRT Treatment    <Continuous>  cycloSPORINE  , modified (GENGRAF) Solution 75 milliGRAM(s) Oral <User Schedule>  diphenoxylate/atropine 2 Tablet(s) Oral every 6 hours  ganciclovir IVPB 250 milliGRAM(s) IV Intermittent every 12 hours  imipenem/cilastatin  IVPB 500 milliGRAM(s) IV Intermittent every 6 hours  insulin lispro (ADMELOG) corrective regimen sliding scale   SubCutaneous every 6 hours  insulin NPH human recombinant 6 Unit(s) SubCutaneous every 6 hours  loperamide Liquid 2 milliGRAM(s) Enteral Tube every 6 hours  melatonin 5 milliGRAM(s) Oral at bedtime  methylPREDNISolone sodium succinate Injectable 32 milliGRAM(s) IV Push <User Schedule>  metoprolol tartrate 25 milliGRAM(s) Oral every 8 hours  metroNIDAZOLE  IVPB 500 milliGRAM(s) IV Intermittent every 8 hours  midodrine 20 milliGRAM(s) Oral every 8 hours  mupirocin 2% Ointment 1 Application(s) Topical every 12 hours  nystatin Powder 1 Application(s) Topical every 12 hours  pantoprazole  Injectable 40 milliGRAM(s) IV Push every 12 hours  phenylephrine    Infusion 0.1 MICROgram(s)/kG/Min (3.73 mL/Hr) IV Continuous <Continuous>  Phoxillum Filtration BK 4 / 2.5 5000 milliLiter(s) (1200 mL/Hr) CRRT <Continuous>  Phoxillum Filtration BK 4 / 2.5 5000 milliLiter(s) (200 mL/Hr) CRRT <Continuous>  PrismaSATE Dialysate BGK 4 / 2.5 5000 milliLiter(s) (1500 mL/Hr) CRRT <Continuous>  trimethoprim  40 mG/sulfamethoxazole 200 mG Suspension 80 milliGRAM(s) Enteral Tube daily  ursodiol Suspension 300 milliGRAM(s) Oral every 12 hours    MEDICATIONS  (PRN):  albuterol/ipratropium for Nebulization 3 milliLiter(s) Nebulizer every 6 hours PRN Shortness of Breath and/or Wheezing  FIRST- Mouthwash  BLM 10 milliLiter(s) Swish and Spit every 8 hours PRN Mouth Care  oxyCODONE    Solution 5 milliGRAM(s) Oral every 4 hours PRN Severe Pain (7 - 10)  oxyCODONE    Solution 2.5 milliGRAM(s) Oral every 4 hours PRN Moderate Pain (4 - 6)  
MEDICATIONS  (STANDING):  albuterol/ipratropium for Nebulization 3 milliLiter(s) Nebulizer every 6 hours  aspirin Suppository 300 milliGRAM(s) Rectal daily  buDESOnide    Inhalation Suspension 0.5 milliGRAM(s) Inhalation two times a day  calcium carbonate 1250 mG  + Vitamin D (OsCal 500 + D) 1 Tablet(s) Oral two times a day  chlorhexidine 0.12% Liquid 15 milliLiter(s) Oral Mucosa every 12 hours  chlorhexidine 2% Cloths 1 Application(s) Topical <User Schedule>  fluconAZOLE IVPB 400 milliGRAM(s) IV Intermittent every 24 hours  heparin   Injectable 5000 Unit(s) SubCutaneous every 12 hours  insulin glargine Injectable (LANTUS) 16 Unit(s) SubCutaneous at bedtime  insulin lispro (ADMELOG) corrective regimen sliding scale   SubCutaneous every 6 hours  levETIRAcetam  IVPB 500 milliGRAM(s) IV Intermittent every 12 hours  LORazepam   Injectable 1 milliGRAM(s) IV Push every 8 hours  metoprolol tartrate Injectable 5 milliGRAM(s) IV Push every 6 hours  naloxegol 12.5 milliGRAM(s) Oral <User Schedule>  pantoprazole  Injectable 40 milliGRAM(s) IV Push daily  piperacillin/tazobactam IVPB.. 3.375 Gram(s) IV Intermittent every 8 hours  predniSONE   Tablet 20 milliGRAM(s) Oral daily  propofol Infusion 30 MICROgram(s)/kG/Min (16.8 mL/Hr) IV Continuous <Continuous>  sodium chloride 0.9%. 1000 milliLiter(s) (30 mL/Hr) IV Continuous <Continuous>  sodium chloride 3%  Inhalation 4 milliLiter(s) Inhalation every 6 hours  trimethoprim   80 mG/sulfamethoxazole 400 mG 1 Tablet(s) Oral daily  ursodiol Suspension 300 milliGRAM(s) Oral two times a day  valGANciclovir 450 milliGRAM(s) Oral daily    MEDICATIONS  (PRN):  HYDROmorphone  Injectable 1 milliGRAM(s) IV Push every 3 hours PRN Breakthrough Pain  
MEDICATIONS  (STANDING):  aspirin enteric coated 81 milliGRAM(s) Oral daily  basiliximab  IVPB 20 milliGRAM(s) IV Intermittent <User Schedule>  buDESOnide    Inhalation Suspension 0.5 milliGRAM(s) Inhalation two times a day  calcium carbonate 1250 mG  + Vitamin D (OsCal 500 + D) 1 Tablet(s) Oral two times a day  chlorhexidine 2% Cloths 1 Application(s) Topical <User Schedule>  dexMEDEtomidine Infusion 0.2 MICROgram(s)/kG/Hr (4.68 mL/Hr) IV Continuous <Continuous>  diltiazem Infusion 10 mG/Hr (10 mL/Hr) IV Continuous <Continuous>  fluconAZOLE   Tablet 400 milliGRAM(s) Oral daily  heparin   Injectable 5000 Unit(s) SubCutaneous every 12 hours  insulin glargine Injectable (LANTUS) 18 Unit(s) SubCutaneous at bedtime  insulin lispro (ADMELOG) corrective regimen sliding scale   SubCutaneous three times a day with meals  insulin lispro (ADMELOG) corrective regimen sliding scale   SubCutaneous at bedtime  insulin lispro Injectable (ADMELOG) 6 Unit(s) SubCutaneous three times a day before meals  lidocaine 1% Injectable 10 milliLiter(s) Local Injection once  melatonin 5 milliGRAM(s) Oral at bedtime  methylPREDNISolone sodium succinate Injectable   IV Push   metoprolol tartrate 50 milliGRAM(s) Oral every 8 hours  mycophenolate mofetil 1000 milliGRAM(s) Oral <User Schedule>  NIFEdipine XL 60 milliGRAM(s) Oral daily  pantoprazole    Tablet 40 milliGRAM(s) Oral before breakfast  polyethylene glycol 3350 17 Gram(s) Oral daily  QUEtiapine 25 milliGRAM(s) Oral at bedtime  senna 2 Tablet(s) Oral at bedtime  tamsulosin 0.4 milliGRAM(s) Oral at bedtime  trimethoprim   80 mG/sulfamethoxazole 400 mG 1 Tablet(s) Oral daily  ursodiol Capsule 300 milliGRAM(s) Oral two times a day  valGANciclovir 450 milliGRAM(s) Oral daily    MEDICATIONS  (PRN):  albuterol/ipratropium for Nebulization 3 milliLiter(s) Nebulizer every 6 hours PRN Shortness of Breath and/or Wheezing  HYDROmorphone  Injectable 1 milliGRAM(s) IV Push every 3 hours PRN Breakthrough Pain  oxyCODONE    IR 5 milliGRAM(s) Oral every 4 hours PRN Moderate Pain (4 - 6)  oxyCODONE    IR 10 milliGRAM(s) Oral every 4 hours PRN Severe Pain (7 - 10)  
MEDICATIONS  (STANDING):  apixaban 2.5 milliGRAM(s) Enteral Tube every 12 hours  aspirin  chewable 81 milliGRAM(s) Enteral Tube daily  atovaquone  Suspension 1500 milliGRAM(s) Oral daily  buDESOnide    Inhalation Suspension 0.5 milliGRAM(s) Inhalation two times a day  chlorhexidine 2% Cloths 1 Application(s) Topical <User Schedule>  cycloSPORINE  , modified (GENGRAF) Solution 75 milliGRAM(s) Oral <User Schedule>  cycloSPORINE  , modified (GENGRAF) Solution 50 milliGRAM(s) Oral <User Schedule>  digoxin  Injectable 125 MICROGram(s) IV Push daily  doxazosin 2 milliGRAM(s) Oral at bedtime  fluconAZOLE IVPB 400 milliGRAM(s) IV Intermittent every 24 hours  insulin lispro (ADMELOG) corrective regimen sliding scale   SubCutaneous every 4 hours  levETIRAcetam  IVPB 250 milliGRAM(s) IV Intermittent every 12 hours  lidocaine   4% Patch 1 Patch Transdermal every 24 hours  metoprolol tartrate 12.5 milliGRAM(s) Oral every 12 hours  mycophenolate mofetil Suspension 500 milliGRAM(s) Oral <User Schedule>  nystatin Powder 1 Application(s) Topical three times a day  pantoprazole  Injectable 40 milliGRAM(s) IV Push daily  predniSONE  Solution 20 milliGRAM(s) Oral every 24 hours  QUEtiapine 50 milliGRAM(s) Oral at bedtime  valGANciclovir 50 mG/mL Oral Solution 900 milliGRAM(s) Oral daily    MEDICATIONS  (PRN):  acetaminophen   IVPB .. 500 milliGRAM(s) IV Intermittent every 6 hours PRN Moderate Pain (4 - 6)  bacitracin   Ointment 1 Application(s) Topical two times a day PRN abrasions  LORazepam   Injectable 0.5 milliGRAM(s) IV Push every 8 hours PRN Agitation

## 2025-01-07 NOTE — BH CONSULTATION LIAISON PROGRESS NOTE - NSICDXBHSECONDARYDX_PSY_ALL_CORE
Status post liver transplant   Z94.4  

## 2025-01-07 NOTE — PROGRESS NOTE ADULT - SUBJECTIVE AND OBJECTIVE BOX
[facility] DIVISION OF KIDNEY DISEASES AND HYPERTENSION -- FOLLOW UP NOTE  --------------------------------------------------------------------------------  Chief Complaint: Oliguric MORAIMA in OLT transplant     24 hour events/subjective: Pt. seen and examined at bedside earlier today. Pt. remains on trach and on phenyl ephrine. Pt. tolerating CRRT, however required cathflo yesterday and has been functioning well. Plan for PLEX today.         PAST HISTORY  --------------------------------------------------------------------------------  No significant changes to PMH, PSH, FHx, SHx, unless otherwise noted    ALLERGIES & MEDICATIONS  --------------------------------------------------------------------------------  Allergies    No Known Allergies    Intolerances      Standing Inpatient Medications  albumin human 25% IVPB 50 milliLiter(s) IV Intermittent every 6 hours  buDESOnide    Inhalation Suspension 0.5 milliGRAM(s) Inhalation every 12 hours  caspofungin IVPB      caspofungin IVPB 50 milliGRAM(s) IV Intermittent every 24 hours  chlorhexidine 0.12% Liquid 15 milliLiter(s) Oral Mucosa every 12 hours  chlorhexidine 2% Cloths 1 Application(s) Topical <User Schedule>  CRRT Treatment    <Continuous>  cycloSPORINE  , modified (GENGRAF) Solution 75 milliGRAM(s) Oral <User Schedule>  diphenoxylate/atropine 2 Tablet(s) Oral every 6 hours  ganciclovir IVPB 250 milliGRAM(s) IV Intermittent every 12 hours  imipenem/cilastatin  IVPB 500 milliGRAM(s) IV Intermittent every 6 hours  insulin lispro (ADMELOG) corrective regimen sliding scale   SubCutaneous every 6 hours  insulin NPH human recombinant 6 Unit(s) SubCutaneous every 6 hours  loperamide Liquid 2 milliGRAM(s) Enteral Tube every 6 hours  melatonin 5 milliGRAM(s) Oral at bedtime  methylPREDNISolone sodium succinate Injectable 32 milliGRAM(s) IV Push <User Schedule>  metoprolol tartrate 25 milliGRAM(s) Oral every 8 hours  metroNIDAZOLE  IVPB 500 milliGRAM(s) IV Intermittent every 8 hours  midodrine 20 milliGRAM(s) Oral every 8 hours  mupirocin 2% Ointment 1 Application(s) Topical every 12 hours  nystatin Powder 1 Application(s) Topical every 12 hours  pantoprazole  Injectable 40 milliGRAM(s) IV Push every 12 hours  phenylephrine    Infusion 0.1 MICROgram(s)/kG/Min IV Continuous <Continuous>  Phoxillum Filtration BK 4 / 2.5 5000 milliLiter(s) CRRT <Continuous>  Phoxillum Filtration BK 4 / 2.5 5000 milliLiter(s) CRRT <Continuous>  PrismaSATE Dialysate BGK 4 / 2.5 5000 milliLiter(s) CRRT <Continuous>  trimethoprim  40 mG/sulfamethoxazole 200 mG Suspension 80 milliGRAM(s) Enteral Tube daily  ursodiol Suspension 300 milliGRAM(s) Oral every 12 hours    PRN Inpatient Medications  albuterol/ipratropium for Nebulization 3 milliLiter(s) Nebulizer every 6 hours PRN  FIRST- Mouthwash  BLM 10 milliLiter(s) Swish and Spit every 8 hours PRN  oxyCODONE    Solution 5 milliGRAM(s) Oral every 4 hours PRN  oxyCODONE    Solution 2.5 milliGRAM(s) Oral every 4 hours PRN      REVIEW OF SYSTEMS  --------------------------------------------------------------------------------  Unable to obtain ROS due to current clinical status.     VITALS/PHYSICAL EXAM  --------------------------------------------------------------------------------  T(C): 37.3 (01-07-25 @ 11:00), Max: 37.5 (01-07-25 @ 03:00)  HR: 96 (01-07-25 @ 12:30) (75 - 98)  BP: 94/53 (01-07-25 @ 12:30) (80/53 - 135/63)  RR: 25 (01-07-25 @ 12:30) (15 - 29)  SpO2: 96% (01-07-25 @ 12:30) (87% - 100%)  Wt(kg): --        01-06-25 @ 07:01  -  01-07-25 @ 07:00  --------------------------------------------------------  IN: 3713.3 mL / OUT: 3940 mL / NET: -226.7 mL    01-07-25 @ 07:01  -  01-07-25 @ 13:05  --------------------------------------------------------  IN: 742.4 mL / OUT: 967 mL / NET: -224.6 mL      Physical Exam:  Gen: on vent via tracheostomy. opens eyes.  HEENT: Icterus present, +NGT  Abdomen: + ileostomy with liquid brown stool, VAC dressing present   MSK: +LE edema and UE edema.   Skin: Superficial skin ulceration b/l thighs/dark necrotic appearing scrotum.   Vascular: Right IJ temporary dialysis catheter     LABS/STUDIES  --------------------------------------------------------------------------------              9.2    6.84  >-----------<  16       [01-07-25 @ 12:19]              27.4     138  |  105  |  32  ----------------------------<  144      [01-07-25 @ 12:19]  4.4   |  19  |  <0.30        Ca     8.5     [01-07-25 @ 12:19]      Mg     2.4     [01-07-25 @ 12:19]      Phos  2.8     [01-07-25 @ 12:19]    TPro  4.2  /  Alb  3.2  /  TBili  21.6  /  DBili  x   /  AST  56  /  ALT  80  /  AlkPhos  105  [01-07-25 @ 12:19]    PT/INR: PT 34.3 , INR 3.02       [01-07-25 @ 12:19]  PTT: 64.2       [01-07-25 @ 12:19]      Creatinine Trend:  SCr <0.30 [01-07 @ 12:19]  SCr <0.30 [01-07 @ 06:08]  SCr 0.35 [01-06 @ 23:43]  SCr 0.44 [01-06 @ 18:25]  SCr 0.38 [01-06 @ 12:35]    Urinalysis - [01-07-25 @ 12:19]      Color  / Appearance  / SG  / pH       Gluc 144 / Ketone   / Bili  / Urobili        Blood  / Protein  / Leuk Est  / Nitrite       RBC  / WBC  / Hyaline  / Gran  / Sq Epi  / Non Sq Epi  / Bacteria       Vitamin D (25OH) <6.0      [11-21-24 @ 08:32]  TSH 0.68      [12-12-24 @ 12:27]  Lipid: chol 114, , HDL 47, LDL --      [04-24-24 @ 06:48]          IMAGING/RADIOLOGY: Reviewed.

## 2025-01-07 NOTE — PROGRESS NOTE ADULT - SUBJECTIVE AND OBJECTIVE BOX
Transplant ID follow up     Afebrile  Repeat Cx NGTD  hypotension on  given albumin on phenylephrine+ plasmapheresis  Repeat BC sent NGTd  remains intubated, BiT trending up despite plasmapheresis    Vital Signs Last 24 Hrs  T(C): 37.2 (2025 07:00), Max: 37.5 (2025 03:00)  T(F): 99 (2025 07:00), Max: 99.5 (2025 03:00)  HR: 95 (2025 09:45) (69 - 97)  BP: 108/58 (2025 09:45) (86/51 - 135/63)  BP(mean): 74 (2025 09:45) (63 - 95)  RR: 26 (2025 09:45) (15 - 28)  SpO2: 95% (2025 09:45) (87% - 100%)    Parameters below as of 2025 07:00  Patient On (Oxygen Delivery Method): ventilator    O2 Concentration (%): 30    EXAM:  General: Patient in no acute distress, intubated   CV: S1+S2, no m/r/g appreciated   Lungs: No respiratory distress, CTAB  Abd: Soft, no guarding, no rebound tenderness,  wound vac in place  distended  Ext: No cyanosis, no edema  Neuro: Intubated   Skin: No rash   IV: No phlebitis  scrotal necrosis          ____________________________________________________  ROS  GENERAL: denies chills, , night sweats, weight loss.   PSYCH: denies depression, anxiety, suicidal ideation, hallucination, and delusions  SKIN: no rash or lesions; no color changes, no abnormal nevi,no  dryness, and nojaundice    EYES: denies visual changes, floaters, pain, inflammation, blurred vision, and discharge  ENT: denies tinnitus, vertigo, epistaxis, oral lesion, and decreased acuity  PULM: denies, hemoptysis, pleurisy  CVS: denies angina, palpitations,+ orthopnea, no syncope, or heart murmur  GI: denies constipation, diarrhea, melena, abdominal pain, nausea.   : denies dysuria, frequency, discharge, incontinence, stones or macroscopic hematuria  MS: no arthralgias, no erythema or swelling, no myalgias, noedema, or lower back pain.   CNS: denies numbness, dizziness, seizure, or tremor  ENDO: denies heat/cold intolerance, polyuria, polydipsia, malaise.    HEME: denies bruising, bleeding, lymphadenopathy, anemia, and calf pain    Allergies  No Known Allergies    __________________________________________________  MEDS:  MEDICATIONS  (STANDING):  albuterol/ipratropium for Nebulization 3 every 6 hours PRN  buDESOnide    Inhalation Suspension 0.5 every 12 hours  cycloSPORINE  , modified (GENGRAF) Solution 75 <User Schedule>  diphenoxylate/atropine 2 every 6 hours  insulin lispro (ADMELOG) corrective regimen sliding scale  every 6 hours  insulin NPH human recombinant 6 every 6 hours  loperamide Liquid 2 every 6 hours  melatonin 5 at bedtime  methylPREDNISolone sodium succinate Injectable 32 <User Schedule>  metoprolol tartrate 25 every 8 hours  midodrine 20 every 8 hours  oxyCODONE    Solution 5 every 4 hours PRN  oxyCODONE    Solution 2.5 every 4 hours PRN  pantoprazole  Injectable 40 every 12 hours  phenylephrine    Infusion 0.1 <Continuous>  ursodiol Suspension 300 every 12 hours    _________________________________________________  ANTIMICOBIALS  caspofungin IVPB 50 every 24 hours  caspofungin IVPB    cycloSPORINE  , modified (GENGRAF) Solution 75 <User Schedule>  ganciclovir IVPB 250 every 24 hours  imipenem/cilastatin  IVPB 500 every 6 hours  metroNIDAZOLE  IVPB 500 every 8 hours  trimethoprim  40 mG/sulfamethoxazole 200 mG Suspension 80 daily      GENERAL LABS              9.2                  139  | 19   | 39           6.34  >-----------< 23      ------------------------< 168                   27.9                 4.3  | 108  | <0.30                                        Ca 8.5   Mg 2.3   Ph 3.0      Vancomycin Level, Random: <4.0 ( @ 06:19)  Vancomycin Level, Random: 9.0 ( @ 05:52)  Vancomycin Level, Random: 15.0 ( @ 06:24)  Vancomycin Level, Random: 9.3 ( @ 06:10)  Vancomycin Level, Random: 16.6 ( @ 06:25)  Vancomycin Level, Random: 16.3 ( @ 06:15)  Vancomycin Level, Random: 11.4 ( @ 06:17)  Vancomycin Level, Random: 4.4 ( @ 06:30)  Vancomycin Level, Random: 8.5 ( @ 06:25)    Urinalysis Basic - ( 2025 06:08 )    Color: x / Appearance: x / SG: x / pH: x  Gluc: 168 mg/dL / Ketone: x  / Bili: x / Urobili: x   Blood: x / Protein: x / Nitrite: x   Leuk Esterase: x / RBC: x / WBC x   Sq Epi: x / Non Sq Epi: x / Bacteria: x        _________________________________________________  MICROBIOLOGY  -----------    Culture - Blood (collected 2025 05:00)  Source: .Blood BLOOD  Preliminary Report (2025 09:01):    No growth at 48 Hours    Culture - Blood (collected 2025 02:20)  Source: .Blood BLOOD  Preliminary Report (2025 09:01):    No growth at 48 Hours            CMVPCR Log: 3.09 Zyd52JV/mL ( @ 00:25)  CMVPCR Lo.73 Kbf63LN/mL ( @ 13:36)    Clostridium difficile GDH Toxins A&amp;B, EIA:   Negative (25 @ 12:59)  Clostridium difficile GDH Interpretation: Negative for toxigenic C. Difficile.  This specimen is negative for C.  Difficile glutamate dehydrogenase (GDH) antigen and negative for C.  Difficile Toxins A & B, by EIA.  GDH is a highly sensitive screening  marker for C. Difficile that is produced in large amounts by all C.  Difficile strains, both toxigenic and nontoxigenic.  This assay has not  been validated as a test of cure.  Repeat testing during the same episode  of diarrhea is of limited value and is discouraged.  The results of this  assay should always be interpreted in conjunction with patient's clinical  history. (25 @ 12:59)        Fungitell:   _______________________________________________  PERTINENT IMAGING

## 2025-01-07 NOTE — PROGRESS NOTE ADULT - ASSESSMENT
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   s/p OLT 11/15 with complicated post op course    [] Septic shock/Cardiogenic shock  [] s/p Colectomy 12/7   [] RTOR for closure and Ileostomy creation   [] IAPB 12/7- removed 12/10  -> E Coli Bacteremia 12/6  -> Ec faecalis UTI (12/16)  -> Stenotrophaonas in trach apirate (12/19)  -> Ec Faecalis in Asc fluid (12/20) (12/26)  -> Clostridium paraputrifucum in blood 12/23, cultures have since cleared  - Tx ID following - on Imipenem/Caspo/Flagyl/Bactrim DS   - 1/1 CT CAP: small pleural effusions, enteritis, rest ok  - all lines changed over 1/3  - Wound care for sacral decubitus ulcer- add collagenase     [] MORAIMA:   -CRRT started 12/7, stopped 12/15, resumed CRRT 12/23    [ ] UGIB s/p EGD (12/26) showing generalized oozing, coagulopathy  - Correct coagulopathy per SICU  - Protonix IV BID  - TF at goal  - Vitamin K 3 days ends 1/5/25    [] s/p OLT  - poor graft function, trial of plasmapheresis 1/3 for (3-5 days), PLEX today, 1/7  - repeat liver US unchanged  - Diet: TFs to goal  - Pain management: avoid narcotics  - Strict I&Os, wound vac placed 12/10   - US: L brachial DVT (holding A/C)  - wound vac care  - ursodiol 300mgBID     [ ] Immunosuppression  - Tacrolimus stopped 11/18 in setting of AMS/Seizure, Started Cyclo 12/17  - Cyclo per level, MMF HELD, Solumedrol 32 mg daily    [] lleus -- resolving  -@ home on Linzess  - imaging with distended colon, improving, (likely opioid induced)  - s/p Neostigmine x 2  - s/p Relistor, last dose 12/1  - s/p colectomy with end ileostomy  (see above)  - ileostomy with >1L output, lomotil and imodium as needed, and change tube feeds  - Colorectal following  - replace losses as able    [] CMV viremia  - CMV PCR (12/11 and 12/16): neg, positive CMV PCR on 12/23  - PPx: Gancyclovir (HELD) in setting of thrombocytopenia initially due to low PLT  - CMV viral load 537 12/31, will attempt Ganciclovir again, next check 1/7    [ ] AMS  - Reintubated 11/20 Aspiration/hypoxia, extubated 11/24->[id number]/24--> extubated 11/26 -> [id number]/7 -> tracheostomy 12/17 -> trach collar trials starting 12/29  - EEG 11/30 negative, Neurology following  - BH following   - off tacro  - on keppra  -CT Head No Cont (12/20): Age-indeterminate posterior left frontal lobe infarct.   -CT Head (12/25): Evolving subacute infarct in the left supramarginal gyrus  -repeat CTH ok, poor MS   - melatonin QHS     [ ] HTN/ pAFib  [ ] coronary vasospasm vs posterior wall MI  - remains off all a/c, failed hep gtt/argatroban due to UGIB  - Cards- plan for PCI prior to DC   - Off Amiodarone, off dobutamine  - BB as needed  - Dr. Quintanilla following    [ ] DM  - per SICU     [] Scrotal abscess   - US (12/12): 2.1 x0.9 x 3.2 cm focal, right testicle- possible abscess (12/12)  - Urology recs: CT scrotum review no debridement required  - Urology recs Derm consult for guidance on wound care   - 12/23 US doppler scrotum: no arterial flow, limited venous flow, bl hydrocele  - 12/15 CT CAP no acute changes   - Elevate scrotum w/ support Urology signed off 12/29

## 2025-01-07 NOTE — BH CONSULTATION LIAISON PROGRESS NOTE - NSBHMSESPEECH_PSY_A_CORE
Non-verbal: unable to assess speech further
Non-verbal: unable to assess speech further
Normal volume, rate, productivity, spontaneity and articulation
Normal volume, rate, productivity, spontaneity and articulation
Abnormal as indicated, otherwise normal...
Abnormal as indicated, otherwise normal...
Normal volume, rate, productivity, spontaneity and articulation

## 2025-01-07 NOTE — PROGRESS NOTE ADULT - SUBJECTIVE AND OBJECTIVE BOX
24 HOUR EVENTS:  - 2 plt for goal >20  - kassandra overnight    NEURO  RASS (if intubated): 		CAM ICU (if concern for delirium):  Exam: AOx0, follows x4  Meds: oxyCODONE    Solution 5 milliGRAM(s) Oral every 4 hours PRN Severe Pain (7 - 10)  oxyCODONE    Solution 2.5 milliGRAM(s) Oral every 4 hours PRN Moderate Pain (4 - 6)      RESPIRATORY  RR: 24 (01-07-25 @ 02:00) (15 - 28)  SpO2: 97% (01-07-25 @ 02:00) (87% - 100%)  Wt(kg): --  Exam: Lungs CTA b/l  Mechanical Ventilation: Mode: AC/ CMV (Assist Control/ Continuous Mandatory Ventilation), RR (machine): 14, RR (patient): 20, TV (machine): 470, FiO2: 30, PEEP: 6, ITime: 1, MAP: 7.8, PIP: 13  ABG - ( 05 Jan 2025 05:25 )  pH: 7.36  /  pCO2: 34    /  pO2: 100   / HCO3: 19    / Base Excess: -5.6  /  SaO2: 98.7    Lactate: x                Meds: albuterol/ipratropium for Nebulization 3 milliLiter(s) Nebulizer every 6 hours PRN Shortness of Breath and/or Wheezing  buDESOnide    Inhalation Suspension 0.5 milliGRAM(s) Inhalation every 12 hours      CARDIOVASCULAR  HR: 90 (01-07-25 @ 02:00) (69 - 108)  BP: 88/53 (01-07-25 @ 02:00) (84/45 - 135/63)  BP(mean): 66 (01-07-25 @ 02:00) (59 - 92)  ABP: --  ABP(mean): --  Wt(kg): --  CVP(cm H2O): --  VBG - ( 06 Jan 2025 23:30 )  pH: 7.31  /  pCO2: 42    /  pO2: 38    / HCO3: 21    / Base Excess: -4.9  /  SaO2: 67.3   Lactate: 3.4                Exam: Normal S1/S2 w/o murmurs or rubs  Cardiac Rhythm: afib  Perfusion     [x ]Adequate   [ ]Inadequate  Mentation   [x ]Normal       [ ]Reduced  Extremities  [ x]Warm         [ ]Cool  Volume Status [ ]Hypervolemic [x ]Euvolemic [ ]Hypovolemic  Meds: metoprolol tartrate 25 milliGRAM(s) Oral every 8 hours  midodrine 10 milliGRAM(s) Oral every 8 hours  phenylephrine    Infusion 0.1 MICROgram(s)/kG/Min IV Continuous <Continuous>      GI/NUTRITION  Exam: abd mildly distended  Diet: tube feeds  Last Bowel Movement: 06-Jan-2025 (01-06-25 @ 19:00)  Last Bowel Movement: 05-Jan-2025 (01-05-25 @ 19:00)  Last Bowel Movement: 05-Jan-2025 (01-05-25 @ 07:00)  Last Bowel Movement: 04-Jan-2025 (01-04-25 @ 19:00)  Last Bowel Movement: 04-Jan-2025 (01-04-25 @ 07:00)  Last Bowel Movement: 03-Jan-2025 (01-03-25 @ 19:00)  Last Bowel Movement: 02-Jan-2025 (01-02-25 @ 19:00)      Meds: diphenoxylate/atropine 2 Tablet(s) Oral every 6 hours  loperamide Liquid 2 milliGRAM(s) Enteral Tube every 6 hours  pantoprazole  Injectable 40 milliGRAM(s) IV Push every 12 hours  ursodiol Suspension 300 milliGRAM(s) Oral every 12 hours      GENITOURINARY  I&O's Detail    01-05 @ 07:01  -  01-06 @ 07:00  --------------------------------------------------------  IN:    Albumin 25%  -  50 mL: 150 mL    Albumin 5%  - 250 mL: 500 mL    Albumin 5%  - 500 mL: 50 mL    Cryoprecipitate: 75 mL    Enteral Tube Flush: 720 mL    IV PiggyBack: 650 mL    IV PiggyBack: 400 mL    IV PiggyBack: 500 mL    Phenylephrine: 131 mL    Phenylephrine: 276.3 mL    Platelets - Single Donor: 225 mL    PRBCs (Packed Red Blood Cells): 900 mL    Vital1.5: 1235 mL  Total IN: 5812.3 mL    OUT:    Bulb (mL): 600 mL    Bulb (mL): 75 mL    Ileostomy (mL): 1500 mL    Indwelling Catheter - Urethral (mL): 20 mL    Insulin: 0 mL    Other (mL): 729 mL    VAC (Vacuum Assisted Closure) System (mL): 10 mL  Total OUT: 2934 mL    Total NET: 2878.3 mL      01-06 @ 07:01  -  01-07 @ 02:55  --------------------------------------------------------  IN:    Albumin 25%  -  50 mL: 150 mL    Enteral Tube Flush: 180 mL    IV PiggyBack: 750 mL    IV PiggyBack: 100 mL    Phenylephrine: 63.5 mL    Platelets - Single Donor: 450 mL    Vital1.5: 1235 mL  Total IN: 2928.5 mL    OUT:    Bulb (mL): 350 mL    Bulb (mL): 800 mL    Ileostomy (mL): 1325 mL    Indwelling Catheter - Urethral (mL): 20 mL    Other (mL): 870 mL    VAC (Vacuum Assisted Closure) System (mL): 0 mL  Total OUT: 3365 mL    Total NET: -436.5 mL          01-06    139  |  106  |  45[H]  ----------------------------<  213[H]  4.2   |  19[L]  |  0.35[L]    Ca    8.5      06 Jan 2025 23:43  Phos  3.6     01-06  Mg     2.2     01-06    TPro  4.2[L]  /  Alb  3.2[L]  /  TBili  17.8[H]  /  DBili  x   /  AST  46[H]  /  ALT  76[H]  /  AlkPhos  104  01-06    Meds: albumin human 25% IVPB 50 milliLiter(s) IV Intermittent every 6 hours      HEMATOLOGIC  Meds:                         8.5    5.32  )-----------( 28       ( 06 Jan 2025 23:43 )             25.5     PT/INR - ( 06 Jan 2025 23:43 )   PT: 30.8 sec;   INR: 2.73 ratio         PTT - ( 06 Jan 2025 23:43 )  PTT:52.1 sec    INFECTIOUS DISEASES  T(C): 36.6 (01-06-25 @ 23:00), Max: 37.6 (01-06-25 @ 03:00)  Wt(kg): --  WBC Count: 5.32 K/uL (01-06 @ 23:43)  WBC Count: 3.86 K/uL (01-06 @ 18:25)  WBC Count: 2.86 K/uL (01-06 @ 12:35)  WBC Count: 5.11 K/uL (01-06 @ 06:17)    Recent Cultures:  Specimen Source: .Blood BLOOD, 01-05 @ 05:00; Results   No growth at 24 hours; Gram Stain: --; Organism: --  Specimen Source: .Blood BLOOD, 01-05 @ 02:20; Results   No growth at 24 hours; Gram Stain: --; Organism: --  Specimen Source: .Blood BLOOD, 01-02 @ 00:30; Results   No growth at 4 days; Gram Stain: --; Organism: --  Specimen Source: .Blood BLOOD, 01-01 @ 01:23; Results   No growth at 4 days; Gram Stain: --; Organism: --    Meds: caspofungin IVPB      caspofungin IVPB 50 milliGRAM(s) IV Intermittent every 24 hours  cycloSPORINE  , modified (GENGRAF) Solution 75 milliGRAM(s) Oral <User Schedule>  ganciclovir IVPB 250 milliGRAM(s) IV Intermittent every 24 hours  imipenem/cilastatin  IVPB 500 milliGRAM(s) IV Intermittent every 6 hours  metroNIDAZOLE  IVPB 500 milliGRAM(s) IV Intermittent every 8 hours  trimethoprim  40 mG/sulfamethoxazole 200 mG Suspension 80 milliGRAM(s) Enteral Tube daily      ENDOCRINE  Capillary Blood Glucose    Meds: insulin lispro (ADMELOG) corrective regimen sliding scale   SubCutaneous every 6 hours  insulin NPH human recombinant 4 Unit(s) SubCutaneous every 6 hours  methylPREDNISolone sodium succinate Injectable 32 milliGRAM(s) IV Push <User Schedule>      ACCESS DEVICES:  [ ] Peripheral IV  [ ] Central Venous Line		[ ] R	[ ] L	[ ] IJ	[ ] Fem	[ ] SC	Placed:   [ ] Arterial Line			[ ] R	[ ] L	[ ] Fem	[ ] Rad	[ ] Ax	Placed:   [ ] PICC:					[ ] Mediport  [ ] Urinary Catheter, Date Placed:   [ ] Necessity of urinary, arterial, and venous catheters discussed    OTHER MEDICATIONS:  chlorhexidine 0.12% Liquid 15 milliLiter(s) Oral Mucosa every 12 hours  chlorhexidine 2% Cloths 1 Application(s) Topical <User Schedule>  CRRT Treatment    <Continuous>  FIRST- Mouthwash  BLM 10 milliLiter(s) Swish and Spit every 8 hours PRN  mupirocin 2% Ointment 1 Application(s) Topical every 12 hours  nystatin Powder 1 Application(s) Topical every 12 hours  Phoxillum Filtration BK 4 / 2.5 5000 milliLiter(s) CRRT <Continuous>  Phoxillum Filtration BK 4 / 2.5 5000 milliLiter(s) CRRT <Continuous>  PrismaSATE Dialysate BGK 4 / 2.5 5000 milliLiter(s) CRRT <Continuous>      IMAGING:

## 2025-01-07 NOTE — BH CONSULTATION LIAISON PROGRESS NOTE - NSBHCONSULTPRIMARYDISCUSSYES_PSY_A_CORE FT
details above 

## 2025-01-07 NOTE — BH CONSULTATION LIAISON PROGRESS NOTE - NSBHFUPINTERVALHXFT_PSY_A_CORE
Patient seen with nursing staff at bedside, noted to be hypersomnolent, unable to follow commands or assess orientation status on exam. Patient mental status limited exam at this. Per nursing staff patient has been sleeping though out the day, however was unaware if patient sleeps well throughout the night.    Patient seen with nursing staff at bedside, noted to be hypersomnolent, unable to follow commands or assess orientation status on exam. Patient mental status limited exam at this. Per nursing staff patient has been sleeping throughout the however was unaware if patient sleeps well throughout the night as patient has had some periods of restlessness overnight.

## 2025-01-07 NOTE — BH CONSULTATION LIAISON PROGRESS NOTE - NSBHCHARTREVIEWINVESTIGATE_PSY_A_CORE FT
EKG 11/19      Ventricular Rate 128 BPM    QRS Duration 76 ms    Q-T Interval 326 ms    QTC Calculation(Bazett) 475 ms    R Axis -4 degrees    T Axis -37 degrees    Diagnosis Line ATRIAL FIBRILLATION WITH RAPID VENTRICULAR RESPONSE  MINIMAL VOLTAGE CRITERIA FOR LVH, MAY BE NORMAL VARIANT ( R in aVL )  NONSPECIFIC T WAVE ABNORMALITY  ABNORMAL ECG  WHEN COMPARED WITH ECG OF 18-NOV-2024 01:24,  SIGNIFICANT CHANGES HAVE OCCURRED  new AF  Confirmed by SPIKE Maynard Zlata ([id number]) on 11/19/2024 6:44:56 PM  
EKG 11/19      Ventricular Rate 128 BPM    QRS Duration 76 ms    Q-T Interval 326 ms    QTC Calculation(Bazett) 475 ms    R Axis -4 degrees    T Axis -37 degrees    Diagnosis Line ATRIAL FIBRILLATION WITH RAPID VENTRICULAR RESPONSE  MINIMAL VOLTAGE CRITERIA FOR LVH, MAY BE NORMAL VARIANT ( R in aVL )  NONSPECIFIC T WAVE ABNORMALITY  ABNORMAL ECG  WHEN COMPARED WITH ECG OF 18-NOV-2024 01:24,  SIGNIFICANT CHANGES HAVE OCCURRED  new AF  Confirmed by SPIKE Maynard Zlata ([id number]) on 11/19/2024 6:44:56 PM  
EKG 12/01      Ventricular Rate 94 BPM    QRS Duration 86 ms    Q-T Interval 362 ms    QTC Calculation(Bazett) 452 ms    R Axis 12 degrees    T Axis 224 degrees    Diagnosis Line POSSIBLE ATRIAL FIBRILLATION  ST & T WAVE ABNORMALITY, CONSIDER INFERIOR ISCHEMIA  ST & T WAVE ABNORMALITY, CONSIDER ANTEROLATERAL ISCHEMIA  ABNORMAL ECG  WHEN COMPARED WITH ECG OF 01-DEC-2024 16:52, (UNCONFIRMED)  NO SIGNIFICANT CHANGE WAS FOUND    Confirmed by MD CHRIS, ALFREDO ([id number]) on 12/2/2024 9:08:03 AM        
EKG 11/19      Ventricular Rate 128 BPM    QRS Duration 76 ms    Q-T Interval 326 ms    QTC Calculation(Bazett) 475 ms    R Axis -4 degrees    T Axis -37 degrees    Diagnosis Line ATRIAL FIBRILLATION WITH RAPID VENTRICULAR RESPONSE  MINIMAL VOLTAGE CRITERIA FOR LVH, MAY BE NORMAL VARIANT ( R in aVL )  NONSPECIFIC T WAVE ABNORMALITY  ABNORMAL ECG  WHEN COMPARED WITH ECG OF 18-NOV-2024 01:24,  SIGNIFICANT CHANGES HAVE OCCURRED  new AF  Confirmed by SPIKE Maynard Zlata ([id number]) on 11/19/2024 6:44:56 PM  
EKG 12/01      Ventricular Rate 94 BPM    QRS Duration 86 ms    Q-T Interval 362 ms    QTC Calculation(Bazett) 452 ms    R Axis 12 degrees    T Axis 224 degrees    Diagnosis Line POSSIBLE ATRIAL FIBRILLATION  ST & T WAVE ABNORMALITY, CONSIDER INFERIOR ISCHEMIA  ST & T WAVE ABNORMALITY, CONSIDER ANTEROLATERAL ISCHEMIA  ABNORMAL ECG  WHEN COMPARED WITH ECG OF 01-DEC-2024 16:52, (UNCONFIRMED)  NO SIGNIFICANT CHANGE WAS FOUND    Confirmed by MD CHRIS, ALFREDO ([id number]) on 12/2/2024 9:08:03 AM        
EKG 12/31    Ventricular Rate 116 BPM    Atrial Rate 116 BPM    P-R Interval 154 ms    QRS Duration 68 ms    Q-T Interval 310 ms    QTC Calculation(Bazett) 430 ms    P Axis 77 degrees    R Axis 52 degrees    T Axis 247 degrees    Diagnosis Line SINUS TACHYCARDIA  LOW VOLTAGE QRS  MARKED ST ABNORMALITY, POSSIBLE ANTERIOR SUBENDOCARDIAL INJURY  ABNORMAL ECG  WHEN COMPARED WITH 24-DEC-2024  NO SIGNIFICANT CHANGE WAS FOUND  Confirmed by GEORGIA PINO MD ([id number]) on 12/31/2024 8:26:56 AM    
EKG 11/14      Ventricular Rate 81 BPM    Atrial Rate 81 BPM    P-R Interval 174 ms    QRS Duration 82 ms    Q-T Interval 390 ms    QTC Calculation(Bazett) 453 ms    P Axis 14 degrees    R Axis 0 degrees    T Axis -3 degrees    Diagnosis Line NORMAL SINUS RHYTHM  MINIMAL VOLTAGE CRITERIA FOR LVH, MAY BE NORMAL VARIANT ( R in aVL )  NONSPECIFIC T WAVE ABNORMALITY  ABNORMAL ECG  NO PREVIOUS ECGS AVAILABLE  Confirmed by Destiny Swartz ([id number]) on 11/17/2024 11:40:44 PM  
EKG 11/19      Ventricular Rate 128 BPM    QRS Duration 76 ms    Q-T Interval 326 ms    QTC Calculation(Bazett) 475 ms    R Axis -4 degrees    T Axis -37 degrees    Diagnosis Line ATRIAL FIBRILLATION WITH RAPID VENTRICULAR RESPONSE  MINIMAL VOLTAGE CRITERIA FOR LVH, MAY BE NORMAL VARIANT ( R in aVL )  NONSPECIFIC T WAVE ABNORMALITY  ABNORMAL ECG  WHEN COMPARED WITH ECG OF 18-NOV-2024 01:24,  SIGNIFICANT CHANGES HAVE OCCURRED  new AF  Confirmed by SPIKE Maynard Zlata ([id number]) on 11/19/2024 6:44:56 PM

## 2025-01-07 NOTE — BH CONSULTATION LIAISON PROGRESS NOTE - NSBHMSEAFFCONG_PSY_A_CORE
Unable to assess
Unable to assess
Non-congruent
Non-congruent
Congruent
Non-congruent
Unable to assess

## 2025-01-07 NOTE — BH CONSULTATION LIAISON PROGRESS NOTE - NSBHFUPREASONCONS_PSY_A_CORE
anxiety/med management

## 2025-01-07 NOTE — PROGRESS NOTE ADULT - ASSESSMENT
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 Post-op course c/b worsening mental status and acute hypoxic respiratory failure requiring multiple intubations/extubations, currently extubated (as of 11/26/24) and colonic ileus s/p several rounds of Relistor and Neostigmine with NGT and rectal tube currently in place now with septic shock due to ischemic bowel s/p total colectomy with ostomy creation. Pt. now septic with blood culture positive for clostridium paraputrificum and s/p PLEX 3 session for hyperbilirubinemia.  Transplant nephrology consulted for metabolic acidosis and MORAIMA.    1. s/p OLT 11/15/24 with oliguric MORAIMA in setting of septic shock.  Likely hemodynamically mediated in setting of cardiogenic and septic shock on multiple vasopressors and prior IABP.   - CT AP non-con: Bilateral renal cysts including cysts with peripheral calcification in the left kidney.  - Initiated on CRRT 12/7/24- 12/15/24 at 1AM stopped. s/p IHD 12/18/24 however required levophed.   - Now back on CRRT, remains oliguric with 30 cc UOP in 24 hour. Pt. remains on vasopressor support and midodrine 10mg TID.   - Will continue CRRT for now, can pause CRRT for PLEX treatment today  - Dose medication per CRRT. Monitor BP and labs.     If you have any questions, please feel free to contact me:  Magaly Tello MD PGY-4  Nephrology Fellow  [employer] Teams (Preferred)/ Pager [phone]   (After 5pm or on weekends please page the on-call fellow)

## 2025-01-07 NOTE — BH CONSULTATION LIAISON PROGRESS NOTE - NSBHPTASSESSDT_PSY_A_CORE
05-Dec-2024 10:02
02-Dec-2024 14:36
27-Nov-2024 15:38
29-Nov-2024 12:43
07-Jan-2025 16:32
19-Nov-2024 14:11
21-Nov-2024 11:49
29-Nov-2024 12:35

## 2025-01-07 NOTE — BH CONSULTATION LIAISON PROGRESS NOTE - NSBHATTESTCOMMENTATTENDFT_PSY_A_CORE
Patient seen at bedside with ACP, agree with above assessment and plan, labs chart reviewed. Coordination of care provided. Patient now extubated, off Linezolid will consider reinitiation of antidepressant after Seroquel optimized. Patient seen with wife at bedside as well as spoke to son with patient's permission. Per patient anxious rumination at night notes due to discomfort and "feeling thirsty." Notes daily 4mg use of Xanax for about 6 months prior to presentation. Will consider PRN Ativan, to be held for respiratory depression. Discussed risk of worsening mental status on high dose of benzodiazepine. Can consider Seroquel in setting of delirium, will also address anxious rumination. Monitor EKg to ensure Qtc < 500.     
Patient seen with ACP, agree with above assessment and plan. Labs/chart reviewed. Coordination of care provided. Patient intubated, unable to participate in interview. Spoke to son at bedside who believes father may have used more than prescribed dose of Xanax of up to 8mg. Discussed Ativan was previously recommended, although delirium can likely be multifactorial, CNI vs. withdrawal vs. metabolic etiology. Additionally, notes patient had extensive psychotropic medication trials predating the time period patient had provided to writer. Will continue to monitor and reassess.   
Patient seen with ACP, agree with above assessment and plan. Labs/chart reviewed. Coordination of care provided. Patient seen at bedside with son present, pt. continues to have anxious ruminations with initial insomnia. Can consider further optimization of Seroquel if Qtc < 500. C/w PRN Ativan w/ PRN Seroquel.
Patient seen with ACP, coordination of care provided with primary team, labs/chart reviewed, agree with above assessment and plan. Frequent day and night reorientation recommended. Melatonin may be protective for delirium, if concerns of hyperactive delirium arise which may lead to sleep disturbance can consider low dose Seroquel use if Qtc < 500.
Patient seen at bedside with ACP, agree with above assessment and plan, labs chart reviewed. Coordination of care provided. Patient with acute concerns of hyperactive delirium, would hold Abilify and Remeron. Consider Seroquel use if Qtc remains below 500. Should mental status worsen in context of CNI use (held currently) would obtain further neurological w/u.

## 2025-01-07 NOTE — BH CONSULTATION LIAISON PROGRESS NOTE - NSBHMSEPERCEPT_PSY_A_CORE
No abnormalities
Unable to assess
No abnormalities
Unable to assess
No abnormalities

## 2025-01-07 NOTE — BH CONSULTATION LIAISON PROGRESS NOTE - NSBHASSESSMENTFT_PSY_ALL_CORE
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 Patient seen by transplant psychiatry for concerns of anxiety post transplant. Patient on exam noted a hx of low mood, anxiety secondary to recent health problems 3 months ago, with need for liver transplant, of which he has been followed by Dr. Hobbs outpatient for management for his mood and anxiety. He noted post operatively experiencing symptoms, anxiety, with anxious ruminations, difficulty with sleep as well as  with feelings of guilt regarding organ from decreased donor.  He denied overt symptoms of panic attacks, lara, AH/Vh/HI/SI, intent or plan. Patient educated at bedside regarding medication regiment, including recommendations for both acute anxiety alleviation as well as sleep. Patient verbalized agreement and understanding with plan proposed.     11/19:Patient on exam A/Ox2, noted to be delirious, denied any AH/VH/HI/SI, intent or plan, educated about medication regiment.    11/21:Patient on exam intubated, collateral from Son at bedside, noted patient has been using Xanax more than prescribed amount on ISTOP.     11/27:Patient on exam A/Ox2-3, noted hx of 4mg of xanax use daily for the last several months, stated he has been anxious d/t physical discomfort on exam, denied any AH/VH/HI/SI, intent or plan.  11/29:Patient on exam noted difficultly sleeping, will increase Seroquel at this time to address sleep, patient denied any AH/Vh/Hi/Si.    12/2:Patient on exam noted having difficultly with sleep overnight, A/Ox3-4 on exam, educated regarding restarting Seroquel of which patient is amenable too.   12/5: Obtunded on exam due to ?syncopal episode as per nursing staff; please hold Seroquel/Ativan if patient remains hypersomnolent; monitor Qtc if < 500  1/7:Patient on exam A/Ox0, unable to follow command, nursing staff at bedside noted patient sleep throughout the day, however unsure if patient is sleeping well at night.    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 Patient seen by transplant psychiatry for concerns of anxiety post transplant. Patient on exam noted a hx of low mood, anxiety secondary to recent health problems 3 months ago, with need for liver transplant, of which he has been followed by Dr. Hobbs outpatient for management for his mood and anxiety. He noted post operatively experiencing symptoms, anxiety, with anxious ruminations, difficulty with sleep as well as  with feelings of guilt regarding organ from decreased donor.  He denied overt symptoms of panic attacks, lara, AH/Vh/HI/SI, intent or plan. Patient educated at bedside regarding medication regiment, including recommendations for both acute anxiety alleviation as well as sleep. Patient verbalized agreement and understanding with plan proposed.     11/19:Patient on exam A/Ox2, noted to be delirious, denied any AH/VH/HI/SI, intent or plan, educated about medication regiment.    11/21:Patient on exam intubated, collateral from Son at bedside, noted patient has been using Xanax more than prescribed amount on ISTOP.     11/27:Patient on exam A/Ox2-3, noted hx of 4mg of xanax use daily for the last several months, stated he has been anxious d/t physical discomfort on exam, denied any AH/VH/HI/SI, intent or plan.  11/29:Patient on exam noted difficultly sleeping, will increase Seroquel at this time to address sleep, patient denied any AH/Vh/Hi/Si.    12/2:Patient on exam noted having difficultly with sleep overnight, A/Ox3-4 on exam, educated regarding restarting Seroquel of which patient is amenable too.   12/5: Obtunded on exam due to ?syncopal episode as per nursing staff; please hold Seroquel/Ativan if patient remains hypersomnolent; monitor Qtc if < 500  1/7:Patient on exam A/Ox0, unable to follow command, nursing staff at bedside noted at bedside unsure if patient is sleeping well at night.

## 2025-01-07 NOTE — BH CONSULTATION LIAISON PROGRESS NOTE - NSBHFUPINTERVALCCFT_PSY_A_CORE
Patient seen as follow up for initial consult of medication management for anxiety post transplant 
Patient obtunded on exam
Patient obtunded on exam
"I am okay" 
"I am tired" 
Patient seen as follow up for initial consult of medication management for anxiety post transplant 
"I could not sleep" 
"I have been a little anxious"

## 2025-01-07 NOTE — CHART NOTE - NSCHARTNOTEFT_GEN_A_CORE
NUTRITION FOLLOW UP NOTE    PATIENT SEEN FOR: sicu follow up     SOURCE: [] Patient  [x] Current Medical Record  [x] RN  [] Family/support person at bedside  [x] Patient unavailable/inappropriate  [x] Other: Team     CHART REVIEWED/EVENTS NOTED.  [x] Nutrition Status:  -S/p OLT 11/15  -Trach   -CRRT       DIET ORDER:   Diet, NPO with Tube Feed:   Tube Feeding Modality: Nasogastric  Vital 1.5 [place] (VITAL1.5RTH)  Total Volume for 24 Hours (mL): 1560  Continuous  Starting Tube Feed Rate {mL per Hour}: 65     Every 4 hours  Until Goal Tube Feed Rate (mL per Hour): 65  Tube Feed Duration (in Hours): 24  Tube Feed Start Time: 10:30  Alfredo(7 Gm Arginine/7 Gm Glut/1.2 Gm HMB     Qty per Day:  2  Banatrol TF     Qty per Day:  3 (01-03-25)    ENTERAL NUTRITION  EN Order Provides:   1560mL of formula, 2340kcal/day (29kcal/kg), 105g protein/day (1.3g/kg), 1192mL free water - based on 80.7kg     Current Pump Rate:  EN provision: 81% EN volume goal provided in past x4 days     -No documentation of Alfredo or Banatrol use since 1/3     ANTHROPOMETRICS:  Drug Dosing Weight  Height (cm): 182.9 (09 Dec 2024 10:11)  Weight (kg): 99.5 (25 Dec 2024 00:00)  BMI (kg/m2): 29.7 (25 Dec 2024 00:00)  BSA (m2): 2.22 (25 Dec 2024 00:00)    NUTRITIONALLY PERTINENT MEDICATIONS:  MEDICATIONS  (STANDING):  albumin human 25% IVPB  caspofungin IVPB  caspofungin IVPB  diphenoxylate/atropine  ganciclovir IVPB  imipenem/cilastatin  IVPB  insulin lispro (ADMELOG) corrective regimen sliding scale  insulin NPH human recombinant  loperamide Liquid  methylPREDNISolone sodium succinate Injectable  metoprolol tartrate  metroNIDAZOLE  IVPB  midodrine  pantoprazole  Injectable  phenylephrine    Infusion  trimethoprim  40 mG/sulfamethoxazole 200 mG Suspension  ursodiol Suspension       NUTRITIONALLY PERTINENT LABS:  01-07 Na138 mmol/L Glu 144 mg/dL[H] K+ 4.4 mmol/L Cr  <0.30 mg/dL[L] BUN 32 mg/dL[H] 01-07 Phos 2.8 mg/dL 01-07 Alb 3.2 g/dL[L]01-07 ALT 80 U/L[H] AST 56 U/L[H] Alkaline Phosphatase 105 U/L      Finger Sticks:  POCT Blood Glucose.: 146 mg/dL (01-07 @ 11:55)  POCT Blood Glucose.: 164 mg/dL (01-07 @ 05:46)  POCT Blood Glucose.: 219 mg/dL (01-06 @ 23:30)  POCT Blood Glucose.: 268 mg/dL (01-06 @ 21:14)  POCT Blood Glucose.: 281 mg/dL (01-06 @ 18:11)      NUTRITIONALLY PERTINENT MEDICATIONS/LABS:  [x] Reviewed  [x] Relevant notes on medications/labs:  -6 units Humulin, sliding scale insulin   -Cyclosporine  -Solu-medrol  -Phenyephrine     EDEMA:  [x] Reviewed  [] Relevant notes:    GI/ I&O:  [x] Reviewed  -Ostomy: 1.7L (1/6)     SKIN:   [x] Pressure injury previously noted  -Sacrum/buttocks: suspected deep tissue injury   -Right upper back: Suspected deep tissue injury      ESTIMATED NEEDS:  [x] No change:  Energy:  2178-2582kcal/day (27-32 kcal/kg)  Protein:  97-121g/day (1.2-1.5 g/kg)  Fluid:   ml/day or [X] defer to team  Based on: IBW of  80.7kg     NUTRITION DIAGNOSIS:  [X] Prior Dx:  1. Increased protein-energy needs   2. Severe malnutrition (Acute)    EDUCATION:  [] Yes:  [x] Not appropriate/warranted    NUTRITION CARE PLAN:  1. EN: Vital 1.5 @ 65mL x 24hrs providing 1560mL of formula, 2340kcal/day (29kcal/kg), 105g protein/day (1.3g/kg), 1192mL free water - based on 80.7kg   -Defer free water to Team   2. Add Alfredo BID to aid in wound healing   3. Banatrol TID to aid in ostomy output   4. Monitor BG levels closely     MONITORING AND EVALUATION:   RD remains available upon request and will follow up per protocol.    Malorie Pike, MS, RDN, CDN (Teams)   Available on [place] TEAMS

## 2025-01-07 NOTE — BH CONSULTATION LIAISON PROGRESS NOTE - NSBHPSYCHOLCOGORIENT_PSY_A_CORE
Not fully oriented...
Oriented to time, place, person, situation
Not fully oriented...
Unable to assess
Not fully oriented...
Unable to assess
Not fully oriented...

## 2025-01-07 NOTE — BH CONSULTATION LIAISON PROGRESS NOTE - NSBHCHARTREVIEWLAB_PSY_A_CORE FT
9.4    11.91 )-----------( 170      ( 02 Dec 2024 05:05 )             31.5     12-02    143  |  114[H]  |  52[H]  ----------------------------<  166[H]  3.5   |  19[L]  |  1.17    Ca    7.0[L]      02 Dec 2024 05:05  Phos  2.3     12-02  Mg     2.2     12-02    TPro  3.6[L]  /  Alb  2.0[L]  /  TBili  0.6  /  DBili  x   /  AST  14  /  ALT  30  /  AlkPhos  136[H]  12-02  
                            10.8   18.62 )-----------( 225      ( 29 Nov 2024 06:13 )             36.1     11-29    145  |  114[H]  |  56[H]  ----------------------------<  60[L]  3.6   |  24  |  1.12    Ca    7.1[L]      29 Nov 2024 00:09  Phos  2.6     11-29  Mg     2.3     11-29    TPro  3.8[L]  /  Alb  2.3[L]  /  TBili  0.7  /  DBili  x   /  AST  47[H]  /  ALT  44  /  AlkPhos  206[H]  11-29  
                           10.5   15.56 )-----------( 120      ( 19 Nov 2024 06:06 )             32.6     11-19    132[L]  |  99  |  44[H]  ----------------------------<  151[H]  3.8   |  24  |  0.61    Ca    7.7[L]      19 Nov 2024 06:06  Phos  2.5     11-19  Mg     2.8     11-19    TPro  5.0[L]  /  Alb  3.3  /  TBili  1.7[H]  /  DBili  x   /  AST  49[H]  /  ALT  175[H]  /  AlkPhos  194[H]  11-19  
                         9.7    13.93 )-----------( 146      ( 21 Nov 2024 05:48 )             30.2     11-21    133[L]  |  99  |  74[H]  ----------------------------<  194[H]  3.2[L]   |  19[L]  |  1.17    Ca    7.8[L]      21 Nov 2024 05:49  Phos  5.8     11-21  Mg     2.8     11-21    TPro  5.0[L]  /  Alb  2.9[L]  /  TBili  0.9  /  DBili  x   /  AST  22  /  ALT  77[H]  /  AlkPhos  114  11-21  
                      10.8   11.13 )-----------( 175      ( 05 Dec 2024 06:25 )             36.0     12-05    143  |  111[H]  |  60[H]  ----------------------------<  241[H]  3.3[L]   |  14[L]  |  1.70[H]    Ca    7.5[L]      05 Dec 2024 06:25  Phos  3.8     12-05  Mg     2.3     12-05    TPro  4.2[L]  /  Alb  2.4[L]  /  TBili  0.6  /  DBili  x   /  AST  17  /  ALT  29  /  AlkPhos  121[H]  12-05    
                           9.2    6.84  )-----------( 16       ( 07 Jan 2025 12:19 )             27.4     01-07    138  |  105  |  32[H]  ----------------------------<  144[H]  4.4   |  19[L]  |  <0.30[L]    Ca    8.5      07 Jan 2025 12:19  Phos  2.8     01-07  Mg     2.4     01-07    TPro  4.2[L]  /  Alb  3.2[L]  /  TBili  21.6[H]  /  DBili  x   /  AST  56[H]  /  ALT  80[H]  /  AlkPhos  105  01-07  
                              10.8   18.62 )-----------( 225      ( 29 Nov 2024 06:13 )             36.1     11-29    145  |  114[H]  |  56[H]  ----------------------------<  60[L]  3.6   |  24  |  1.12    Ca    7.1[L]      29 Nov 2024 00:09  Phos  2.6     11-29  Mg     2.3     11-29    TPro  3.8[L]  /  Alb  2.3[L]  /  TBili  0.7  /  DBili  x   /  AST  47[H]  /  ALT  44  /  AlkPhos  206[H]  11-29  
                           9.6    14.96 )-----------( 189      ( 27 Nov 2024 06:34 )             32.7     11-27    148[H]  |  115[H]  |  54[H]  ----------------------------<  254[H]  4.1   |  20[L]  |  1.27    Ca    7.4[L]      27 Nov 2024 06:34  Phos  3.3     11-27  Mg     2.3     11-27    TPro  3.7[L]  /  Alb  2.1[L]  /  TBili  0.5  /  DBili  x   /  AST  23  /  ALT  33  /  AlkPhos  128[H]  11-27

## 2025-01-07 NOTE — BH CONSULTATION LIAISON PROGRESS NOTE - NSBHCONSULTRECOMMENDOTHER_PSY_A_CORE FT
Plan:    -C/W Melatonin 5mg Po QHS for sleep   -If patient mental status improves, and patient is A/Ox4, with no delirium noted can consider using Trazodone 25mg PPo QHS if QTC < 500  -Please HOLD: Wellbutrin, Lexapro, Abilify, Remeron given patient altered mental status   -Transplant psychiatry will continue to follow patient       Plan:  -C/W Melatonin 5mg Po QHS for sleep   -If patient is restless, with symptoms of delirium can consider Seroquel 12.5mg PO QHS   -Please HOLD: Wellbutrin, Lexapro, Abilify, Remeron given patient altered mental status   -Transplant psychiatry will continue to follow patient

## 2025-01-07 NOTE — PROGRESS NOTE ADULT - SUBJECTIVE AND OBJECTIVE BOX
Transplant Surgery - Multidisciplinary Rounds  --------------------------------------------------------------  OLT   11/15/2024         Colectomy 12/7/2024     IABP 12/7 removed 12/10  Tracheostomy 12/17/2024    Present:   Patient seen and examined with multidisciplinary Transplant team including Surgeon: Dr. Dagher, Hepatologist Dr. Luis, JAZZ Garcia/Gopi and bedside RN in AM rounds.   Disciplines not in attendance will be notified of the plan.     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   s/p OLT 11/15 with post op course c/b:  ·	Hypoxia: Reintubated 11/20, extubated 11/24->reintubated 11/24, extubated 11/26, reintubated 12/7, trached 12/17  ·	Ileus   ·	A fib  ·	AMS/Seizure   ·	L brachial DVT  ·	Neutropenia  ·	E coli Bacteremia (12/6)  ·	s/p Colectomy 12/7.   ·	IABP 12/7, removed 12/10  ·	Ec Faecalis UTI (12/16)  ·	Stenotrophamonas in trach aspirate (12/19)  ·	Clostridium in blood 12/23  ·	UGIB 12/26  ·	CMV Viremia  ·	MORAIMA requiring CRRT  ·	Shock liver    Interval/Overnight Events:   - mentation remains the same   - on CRRT, required kassandra ON     Immunosuppression:  - Cyclo per level, MMF HELD, Solu 32  -ongoing monitoring for signs of rejection        MEDICATIONS  (STANDING):  albumin human 25% IVPB 50 milliLiter(s) IV Intermittent every 6 hours  buDESOnide    Inhalation Suspension 0.5 milliGRAM(s) Inhalation every 12 hours  caspofungin IVPB      caspofungin IVPB 50 milliGRAM(s) IV Intermittent every 24 hours  chlorhexidine 0.12% Liquid 15 milliLiter(s) Oral Mucosa every 12 hours  chlorhexidine 2% Cloths 1 Application(s) Topical <User Schedule>  CRRT Treatment    <Continuous>  cycloSPORINE  , modified (GENGRAF) Solution 75 milliGRAM(s) Oral <User Schedule>  diphenoxylate/atropine 2 Tablet(s) Oral every 6 hours  ganciclovir IVPB 250 milliGRAM(s) IV Intermittent every 12 hours  imipenem/cilastatin  IVPB 500 milliGRAM(s) IV Intermittent every 6 hours  insulin lispro (ADMELOG) corrective regimen sliding scale   SubCutaneous every 6 hours  insulin NPH human recombinant 6 Unit(s) SubCutaneous every 6 hours  loperamide Liquid 2 milliGRAM(s) Enteral Tube every 6 hours  melatonin 5 milliGRAM(s) Oral at bedtime  methylPREDNISolone sodium succinate Injectable 32 milliGRAM(s) IV Push <User Schedule>  metoprolol tartrate 25 milliGRAM(s) Oral every 8 hours  metroNIDAZOLE  IVPB 500 milliGRAM(s) IV Intermittent every 8 hours  midodrine 20 milliGRAM(s) Oral every 8 hours  mupirocin 2% Ointment 1 Application(s) Topical every 12 hours  nystatin Powder 1 Application(s) Topical every 12 hours  pantoprazole  Injectable 40 milliGRAM(s) IV Push every 12 hours  phenylephrine    Infusion 0.1 MICROgram(s)/kG/Min (3.73 mL/Hr) IV Continuous <Continuous>  Phoxillum Filtration BK 4 / 2.5 5000 milliLiter(s) (1200 mL/Hr) CRRT <Continuous>  Phoxillum Filtration BK 4 / 2.5 5000 milliLiter(s) (200 mL/Hr) CRRT <Continuous>  PrismaSATE Dialysate BGK 4 / 2.5 5000 milliLiter(s) (1500 mL/Hr) CRRT <Continuous>  trimethoprim  40 mG/sulfamethoxazole 200 mG Suspension 80 milliGRAM(s) Enteral Tube daily  ursodiol Suspension 300 milliGRAM(s) Oral every 12 hours    MEDICATIONS  (PRN):  albuterol/ipratropium for Nebulization 3 milliLiter(s) Nebulizer every 6 hours PRN Shortness of Breath and/or Wheezing  FIRST- Mouthwash  BLM 10 milliLiter(s) Swish and Spit every 8 hours PRN Mouth Care  oxyCODONE    Solution 5 milliGRAM(s) Oral every 4 hours PRN Severe Pain (7 - 10)  oxyCODONE    Solution 2.5 milliGRAM(s) Oral every 4 hours PRN Moderate Pain (4 - 6)      PAST MEDICAL & SURGICAL HISTORY:  Diabetes      Transaminitis      Paroxysmal atrial fibrillation      Depression      BPH (benign prostatic hyperplasia)      Hypertension      Chronic atrial fibrillation      Coronary artery disease      Hepatocellular carcinoma      DM (diabetes mellitus)      HTN (hypertension)      Paroxysmal atrial fibrillation      Cirrhosis      HCC (hepatocellular carcinoma)      History of BPH      History of laparoscopic cholecystectomy      History of lumbar laminectomy      H/O prior ablation treatment      H/O percutaneous left heart catheterization          Vital Signs Last 24 Hrs  T(C): 37.1 (07 Jan 2025 15:00), Max: 37.5 (07 Jan 2025 03:00)  T(F): 98.8 (07 Jan 2025 15:00), Max: 99.5 (07 Jan 2025 03:00)  HR: 88 (07 Jan 2025 17:00) (75 - 99)  BP: 113/56 (07 Jan 2025 17:00) (80/53 - 133/64)  BP(mean): 76 (07 Jan 2025 17:00) (61 - 95)  RR: 24 (07 Jan 2025 17:00) (18 - 31)  SpO2: 99% (07 Jan 2025 17:00) (87% - 100%)    Parameters below as of 07 Jan 2025 15:00  Patient On (Oxygen Delivery Method): ventilator    O2 Concentration (%): 30    I&O's Summary    06 Jan 2025 07:01  -  07 Jan 2025 07:00  --------------------------------------------------------  IN: 3713.3 mL / OUT: 3940 mL / NET: -226.7 mL    07 Jan 2025 07:01  -  07 Jan 2025 17:07  --------------------------------------------------------  IN: 1777 mL / OUT: 1132 mL / NET: 645 mL                              9.2    6.84  )-----------( 16       ( 07 Jan 2025 12:19 )             27.4     01-07    138  |  105  |  32[H]  ----------------------------<  144[H]  4.4   |  19[L]  |  <0.30[L]    Ca    8.5      07 Jan 2025 12:19  Phos  2.8     01-07  Mg     2.4     01-07    TPro  4.2[L]  /  Alb  3.2[L]  /  TBili  21.6[H]  /  DBili  x   /  AST  56[H]  /  ALT  80[H]  /  AlkPhos  105  01-07          Culture - Blood (collected 01-05-25 @ 05:00)  Source: .Blood BLOOD  Preliminary Report (01-07-25 @ 09:01):    No growth at 48 Hours    Culture - Blood (collected 01-05-25 @ 02:20)  Source: .Blood BLOOD  Preliminary Report (01-07-25 @ 09:01):    No growth at 48 Hours    Culture - Blood (collected 01-02-25 @ 00:30)  Source: .Blood BLOOD  Final Report (01-07-25 @ 05:01):    No growth at 5 days    Culture - Blood (collected 01-01-25 @ 01:23)  Source: .Blood BLOOD  Final Report (01-07-25 @ 05:01):    No growth at 5 days                                    ROS: Unable to assess patient is lethargic, s/p tracheostomy    PHYSICAL EXAM:   Constitutional: trach to vent, awake, unresponsive  Eyes:  PERRLA, Scleral icterus  ENMT: nc/at, no thrush, NGT  Neck: supple, trach   Respiratory: CTA B/L  Cardiovascular: RRR  Gastrointestinal: incision clean/dry/intact + Ostomy pink output stool, wound vac, ALYSSA x2 ss  Genitourinary: +scrotal edema w/ large fluid collection; black/green discoloration  Extremities: SCD's in place and working bilaterally, + LE edema  Neurological: trach to vent, opens eyes   Skin: large sacral decub, poor healing with breakdown, wound care applied

## 2025-01-07 NOTE — BH CONSULTATION LIAISON PROGRESS NOTE - NSBHCONSULTWORKUPLABSFT_PSY_ALL_CORE
folate, Vit B12, TSH w/ FT4

## 2025-01-07 NOTE — PROGRESS NOTE ADULT - ASSESSMENT
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 Post op course complicated by acute hypoxic respiratory failure requiring intubation multiple times, most recently on 12/7 with conversion to tracheostomy on 12/17, e.coli bacteremia with RTOR on 12/7 for concern for worsening septic shock and cardiogenic shock s/p balloon pump placement and total abdominal colectomy in setting of ischemic bowel s/p OR on 12/9 for ileostomy creation, and olirugirc MORAIMA requiring CRRT and now intermittent HD.    Diagnosed with pneumonia secondary to Stenotrophomonas    Also with growth of Enterococcus faecalis from abdominal drains and urine culture    More fever and shock event on 12/29/24 likely 2/2 GIB    UA (12/19) 2 WBC  BCx (12/23) Clostridium paraputrificum  Bronch Cx (12/23) NGTD    CT Chest (12/23) Bibasilar groundglass and tree in bud opacities which may represent distal airway impaction versus pneumonia.    CT A/P (12/23) Peripheral wedge-shaped areas of hypoattenuation involving the superior aspect of the spleen, suggestive of splenic infarcts (12-59). Mildly dilated loops of proximal small bowel without transition point, likely ileus.    Testicular US (12/23) No appreciable arterial flow with very limited venous flow in in the testes bilaterally. Interval decrease in diffuse scrotal edema. Small bilateral hydroceles.    CT Pelvis (12/25) Moderate diffuse subcutaneous/scrotal edema without defined collection or subcutaneous air.    BCX (12/28): Gram variable rods (Blood PCR neg)    Antibiotic Course:  Meropenem ( 11/22-11/29, 11/22-11/29, 12/16-)   Minocycline (12/21-1/1)  Bactrim (11/16-11/24, 12/8-12/11, 12/21-)  Fluconazole (11/16-12/2, 12/12-12/16)   Caspofungin ( 12/6-12/11, 12/17-)  Cefepime (12/10-12/16)   Metronidazole (12/10-12/14)   Ganciclovir (12/7-12/13)   Linezolid (11/23-11/26, 12/6-12/11, 12/28-12/29)   Atovaquone (11/29-12/6)   Zosyn (11/20-11/22,12/6)   Tobramycin (12/6)   Valganciclovir (11/6-12/4)    #Positive Blood Culture (12/23 Clostridium paraputrificum) (12/28: Gram variable rods -Blood PCR neg)  Clostridium paraputrificum is generally penicillin and metronidazole susceptible      #Leukocytosis, Fever, Shock, Transaminitis,   #Stenotrophomonas tracheo/Pneumonia? s/p minocycline course  # polymicrobial bacteremia E faecalis; Clostridium spp in c/o Gi pathology but also necrotic scrotum     --If further worsening shock overnight would repeat blood cultures and administer Tobramycin 600 mg IV x1 (7 mg/kg based on adjusted body weight).   --CT C/A/P showing Small bilateral pleural effusions with associated compressive atelectasis, increased since prior exam. Mild distention and mural thickening of small bowel of concern for enteritis. Status post liver transplant and colectomy with stable postsurgical changes.  --s/p 10 day course of Minocycline 200 mg IV Q12H (12/21 >1/1) for Stenorophomnas   --Continue flagyl 500mg Q8, and  Imipenem 500mg Q6  --remains on Caspofungin;   -Decrease immunosuppression if possible   improved hemodynamics and likely from fluid shift, recommend no additional doses of tobtramycin and can stop linezolid  BiT today - continuing plasmapheresis    #Liver Transplant Recipient, Prophylactic Antibiotic  --Repeat CMV detectable on 12/31  --Given thrombocytopenia can consider holding Bactrim or now  Cytomegalovirus By PCR: Det <34.5 IU/mL (12.23.24 @ 06:15)  Cytomegalovirus By PCR: 537 IU/mL (12.31.24 @ 13:36)  --restarted  ganciclovir IV adjusted for CVVH dosing- but at 2.5 mg/kg daily- now CMV PCR 1200--> would increase ganciclovir to 2.5 mg/kg q12h  check CMV PCR next monday      Thank you for involving us in the care of this patient  Transplant ID will continue to follow  Please call or page with additional questions  Pager; #[phone]  Teams: from 8 am to 5 pm  Rachele Lewis MD

## 2025-01-07 NOTE — BH CONSULTATION LIAISON PROGRESS NOTE - NSICDXBHPRIMARYDX_PSY_ALL_CORE
Major depressive disorder, recurrent episode, mild with anxious distress   F33.0  

## 2025-01-07 NOTE — BH CONSULTATION LIAISON PROGRESS NOTE - MSE OPTIONS
Unstructured MSE
Structured MSE

## 2025-01-08 NOTE — PROGRESS NOTE ADULT - SUBJECTIVE AND OBJECTIVE BOX
Transplant ID follow up     Afebrile  Repeat Cx NGTD  hypotension on  given albumin on phenylephrine+ plasmapheresis  Repeat BC sent NGTd  remains intubated, BiT trending up despite plasmapheresis    resumed plasmapheresis  more awake     EXAM:  General: Patient in no acute distress, intubated   CV: S1+S2, no m/r/g appreciated   Lungs: No respiratory distress, CTAB  Abd: Soft, no guarding, no rebound tenderness,  wound vac in place  distended  Ext: No cyanosis, no edema  Neuro: Intubated   Skin: No rash   IV: No phlebitis  scrotal necrosis          ____________________________________________________  ROS  GENERAL: denies chills, , night sweats, weight loss.   PSYCH: denies depression, anxiety, suicidal ideation, hallucination, and delusions  SKIN: no rash or lesions; no color changes, no abnormal nevi,no  dryness, and nojaundice    EYES: denies visual changes, floaters, pain, inflammation, blurred vision, and discharge  ENT: denies tinnitus, vertigo, epistaxis, oral lesion, and decreased acuity  PULM: denies, hemoptysis, pleurisy  CVS: denies angina, palpitations,+ orthopnea, no syncope, or heart murmur  GI: denies constipation, diarrhea, melena, abdominal pain, nausea.   : denies dysuria, frequency, discharge, incontinence, stones or macroscopic hematuria  MS: no arthralgias, no erythema or swelling, no myalgias, noedema, or lower back pain.   CNS: denies numbness, dizziness, seizure, or tremor  ENDO: denies heat/cold intolerance, polyuria, polydipsia, malaise.    HEME: denies bruising, bleeding, lymphadenopathy, anemia, and calf pain    Allergies  No Known Allergies    __________________________________________________  MEDS:  MEDICATIONS  (STANDING):  albuterol/ipratropium for Nebulization 3 every 6 hours PRN  buDESOnide    Inhalation Suspension 0.5 every 12 hours  cycloSPORINE  , modified (GENGRAF) Solution 75 <User Schedule>  diphenoxylate/atropine 2 every 6 hours  insulin lispro (ADMELOG) corrective regimen sliding scale  every 6 hours  insulin NPH human recombinant 6 every 6 hours  loperamide Liquid 2 every 6 hours  melatonin 5 at bedtime  methylPREDNISolone sodium succinate Injectable 32 <User Schedule>  metoprolol tartrate 25 every 8 hours  midodrine 20 every 8 hours  oxyCODONE    Solution 5 every 4 hours PRN  oxyCODONE    Solution 2.5 every 4 hours PRN  pantoprazole  Injectable 40 every 12 hours  phenylephrine    Infusion 0.1 <Continuous>  ursodiol Suspension 300 every 12 hours    _________________________________________________  ANTIMICOBIALS  caspofungin IVPB 50 every 24 hours  caspofungin IVPB    cycloSPORINE  , modified (GENGRAF) Solution 75 <User Schedule>  ganciclovir IVPB 250 every 24 hours  imipenem/cilastatin  IVPB 500 every 6 hours  metroNIDAZOLE  IVPB 500 every 8 hours  trimethoprim  40 mG/sulfamethoxazole 200 mG Suspension 80 daily      GENERAL LABS              9.2                  139  | 19   | 39           6.34  >-----------< 23      ------------------------< 168                   27.9                 4.3  | 108  | <0.30                                        Ca 8.5   Mg 2.3   Ph 3.0      Vancomycin Level, Random: <4.0 ( @ 06:19)  Vancomycin Level, Random: 9.0 ( @ 05:52)  Vancomycin Level, Random: 15.0 ( @ 06:24)  Vancomycin Level, Random: 9.3 ( @ 06:10)  Vancomycin Level, Random: 16.6 ( @ 06:25)  Vancomycin Level, Random: 16.3 ( @ 06:15)  Vancomycin Level, Random: 11.4 ( @ 06:17)  Vancomycin Level, Random: 4.4 ( @ 06:30)  Vancomycin Level, Random: 8.5 ( @ 06:25)    Urinalysis Basic - ( 2025 06:08 )    Color: x / Appearance: x / SG: x / pH: x  Gluc: 168 mg/dL / Ketone: x  / Bili: x / Urobili: x   Blood: x / Protein: x / Nitrite: x   Leuk Esterase: x / RBC: x / WBC x   Sq Epi: x / Non Sq Epi: x / Bacteria: x        _________________________________________________  MICROBIOLOGY  -----------    Culture - Blood (collected 2025 05:00)  Source: .Blood BLOOD  Preliminary Report (2025 09:01):    No growth at 48 Hours    Culture - Blood (collected 2025 02:20)  Source: .Blood BLOOD  Preliminary Report (2025 09:01):    No growth at 48 Hours            CMVPCR Log: 3.09 Ohw60ZM/mL ( @ 00:25)  CMVPCR Lo.73 Xbi74EB/mL ( @ 13:36)    Clostridium difficile GDH Toxins A&amp;B, EIA:   Negative (25 @ 12:59)  Clostridium difficile GDH Interpretation: Negative for toxigenic C. Difficile.  This specimen is negative for C.  Difficile glutamate dehydrogenase (GDH) antigen and negative for C.  Difficile Toxins A & B, by EIA.  GDH is a highly sensitive screening  marker for C. Difficile that is produced in large amounts by all C.  Difficile strains, both toxigenic and nontoxigenic.  This assay has not  been validated as a test of cure.  Repeat testing during the same episode  of diarrhea is of limited value and is discouraged.  The results of this  assay should always be interpreted in conjunction with patient's clinical  history. (25 @ 12:59)        Fungitell:   _______________________________________________  PERTINENT IMAGING

## 2025-01-08 NOTE — ADVANCED PRACTICE NURSE CONSULT - ASSESSMENT
Seen with PT wound care Rico Angeles for routine vac dressing  and complete ostomy pouching system changes.  Liver transplant MDs at bedside. (pls see separate notes for details)Chart reviewed & events noted. Pt in bed, asleep, son was at bedside but left room during visit. Pt tolerated procedure well. Complete pouching system changed. Stoma 1 5/8" with known grayish adherent slough remains at 9 o'clock. Stoma red & viable, some yellow-grayish slough remains. No bleeding noted. Stoma is functioning for bilious brown drainage, Peristomal skin and mucocutaneous junction intact. Noted dried blood vs adherent brown slough at mucocutaneous junction from 6- 9 o'clock, known + creases/skin indents at 3 and 9 o'clock. Dusted the peristomal skin with stoma powder excess removed.  Dab in with [employer] no sting barrier liquid film.  Re pouched w/ 2 1/4" flat skin barrier.  Bead of stoma paste applied to skin creases to level the surface and at the back of skin barrier near opening to caulk & high output pouch re- attached to bedside drainage bag. Supplies and pattern  left at the bedside.  Will continue to follow up.

## 2025-01-08 NOTE — PROVIDER CONTACT NOTE (OTHER) - RECOMMENDATIONS
Ice packs
Perform 12 lead EKG, send labs.
EKG? Trops? provider to bedside.
JAZZ Wright aware is situation, CRRT not working due to access issues, blood unable to be returned, JAZZ Wright aware, 1 unit PRBC Ordered due to blood unable to be returned
continue to monitor
give additional dose of Metoroprolol. ECG?
JAZZ Wright come to bedside to access catheter

## 2025-01-08 NOTE — PROGRESS NOTE ADULT - SUBJECTIVE AND OBJECTIVE BOX
Transplant Surgery - Multidisciplinary Rounds  --------------------------------------------------------------  OLT   11/15/2024         Colectomy 12/7/2024     IABP 12/7 removed 12/10  Tracheostomy 12/17/2024    Present:   Patient seen and examined with multidisciplinary Transplant team including Surgeon: Dr. Dagher, Hepatologist Dr. Luis, JAZZ Velasquez and bedside RN in AM rounds.   Disciplines not in attendance will be notified of the plan.     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   s/p OLT 11/15 with post op course c/b:  ·	Hypoxia: Reintubated 11/20, extubated 11/24->reintubated 11/24, extubated 11/26, reintubated 12/7, trached 12/17  ·	Ileus   ·	A fib  ·	AMS/Seizure   ·	L brachial DVT  ·	Neutropenia  ·	E coli Bacteremia (12/6)  ·	s/p Colectomy 12/7.   ·	IABP 12/7, removed 12/10  ·	Ec Faecalis UTI (12/16)  ·	Stenotrophamonas in trach aspirate (12/19)  ·	Clostridium in blood 12/23  ·	UGIB 12/26  ·	CMV Viremia  ·	MORAIMA requiring CRRT  ·	Shock liver    Interval/Overnight Events:   - on/off kassandra   - on CRRT net even   - s/p PLEX   - rising CMV- ganciclovir inc BID     Immunosuppression:  - Cyclo per level, MMF HELD, Solu 32  -ongoing monitoring for signs of rejection      TX data here                                 ROS: Unable to assess patient is lethargic, s/p tracheostomy    PHYSICAL EXAM:   Constitutional: trach to vent, awake, unresponsive  Eyes:  PERRLA, Scleral icterus  ENMT: nc/at, no thrush, NGT  Neck: supple, trach   Respiratory: CTA B/L  Cardiovascular: RRR  Gastrointestinal: incision clean/dry/intact + Ostomy pink output stool, wound vac, ALYSSA x2 ss  Genitourinary: +scrotal edema w/ large fluid collection; black/green discoloration  Extremities: SCD's in place and working bilaterally, + LE edema  Neurological: trach to vent, opens eyes   Skin: large sacral decub, poor healing with breakdown, wound care applied  Transplant Surgery - Multidisciplinary Rounds  --------------------------------------------------------------  OLT   11/15/2024         Colectomy 12/7/2024     IABP 12/7 removed 12/10  Tracheostomy 12/17/2024    Present:   Patient seen and examined with multidisciplinary Transplant team including Surgeon: Dr. Dagher, Hepatologist Dr. Luis, JAZZ Velasquez and bedside RN in AM rounds.   Disciplines not in attendance will be notified of the plan.     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   s/p OLT 11/15 with post op course c/b:  ·	Hypoxia: Reintubated 11/20, extubated 11/24->reintubated 11/24, extubated 11/26, reintubated 12/7, trached 12/17  ·	Ileus   ·	A fib  ·	AMS/Seizure   ·	L brachial DVT  ·	Neutropenia  ·	E coli Bacteremia (12/6)  ·	s/p Colectomy 12/7.   ·	IABP 12/7, removed 12/10  ·	Ec Faecalis UTI (12/16)  ·	Stenotrophamonas in trach aspirate (12/19)  ·	Clostridium in blood 12/23  ·	UGIB 12/26  ·	CMV Viremia  ·	MORAIMA requiring CRRT  ·	Shock liver    Interval/Overnight Events:   - on/off kassandra   - midodrine increased 20mg Q8hrs - wean off pressors   - lactate 4.1 s/p albumin [id number]   - s/p PLEX   - rising CMV- ganciclovir inc BID     Immunosuppression:  - Cyclo per level, MMF HELD, Solu 32  -ongoing monitoring for signs of rejection      TX data here                                 ROS: Unable to assess patient is lethargic, s/p tracheostomy    PHYSICAL EXAM:   Constitutional: trach to vent, awake, unresponsive  Eyes:  PERRLA, Scleral icterus  ENMT: nc/at, no thrush, NGT  Neck: supple, trach   Respiratory: CTA B/L  Cardiovascular: RRR  Gastrointestinal: incision clean/dry/intact + Ostomy pink output stool, wound vac, ALYSSA x2 ss  Genitourinary: +scrotal edema w/ large fluid collection; black/green discoloration  Extremities: SCD's in place and working bilaterally, + LE edema  Neurological: trach to vent, opens eyes   Skin: large sacral decub, poor healing with breakdown, wound care applied  Transplant Surgery - Multidisciplinary Rounds  --------------------------------------------------------------  OLT   11/15/2024         Colectomy 12/7/2024     IABP 12/7 removed 12/10  Tracheostomy 12/17/2024    Present:   Patient seen and examined with multidisciplinary Transplant team including Surgeon: Dr. Dagher, Hepatologist Dr. Luis, JAZZ Velasquez/Jasmine and bedside RN in AM rounds.   Disciplines not in attendance will be notified of the plan.     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   s/p OLT 11/15 with post op course c/b:  ·	Hypoxia: Reintubated 11/20, extubated 11/24->reintubated 11/24, extubated 11/26, reintubated 12/7, trached 12/17  ·	Ileus   ·	A fib  ·	AMS/Seizure   ·	L brachial DVT  ·	Neutropenia  ·	E coli Bacteremia (12/6)  ·	s/p Colectomy 12/7.   ·	IABP 12/7, removed 12/10  ·	Ec Faecalis UTI (12/16)  ·	Stenotrophamonas in trach aspirate (12/19)  ·	Clostridium in blood 12/23  ·	UGIB 12/26  ·	CMV Viremia  ·	MORAIMA requiring CRRT  ·	Shock liver    Interval/Overnight Events:   - afebrile, VSS  - plex yesterday   - midodrine inc 20 q8h  - ganciclovir inc BID for rising CMV   - off Aldo, lactate 4.5 s/p albumin 250x1  - 2u PRBC/2u Plt with good response   - CRRT clotted, s/p cathflow    Immunosuppression:  - Cyclo per level, MMF HELD, Solu 32  -ongoing monitoring for signs of rejection        MEDICATIONS  (STANDING):  albumin human 25% IVPB 100 milliLiter(s) IV Intermittent every 6 hours  buDESOnide    Inhalation Suspension 0.5 milliGRAM(s) Inhalation every 12 hours  caspofungin IVPB 50 milliGRAM(s) IV Intermittent every 24 hours  caspofungin IVPB      chlorhexidine 0.12% Liquid 15 milliLiter(s) Oral Mucosa every 12 hours  chlorhexidine 2% Cloths 1 Application(s) Topical <User Schedule>  collagenase Ointment 1 Application(s) Topical daily  CRRT Treatment    <Continuous>  cycloSPORINE  , modified (GENGRAF) Solution 75 milliGRAM(s) Oral <User Schedule>  diphenoxylate/atropine 2 Tablet(s) Oral every 6 hours  ganciclovir IVPB 250 milliGRAM(s) IV Intermittent every 12 hours  imipenem/cilastatin  IVPB 500 milliGRAM(s) IV Intermittent every 6 hours  insulin lispro (ADMELOG) corrective regimen sliding scale   SubCutaneous every 6 hours  insulin NPH human recombinant 6 Unit(s) SubCutaneous every 6 hours  loperamide Liquid 4 milliGRAM(s) Enteral Tube every 6 hours  melatonin 5 milliGRAM(s) Oral at bedtime  methylPREDNISolone sodium succinate Injectable 32 milliGRAM(s) IV Push <User Schedule>  metoprolol tartrate 25 milliGRAM(s) Oral every 8 hours  metroNIDAZOLE  IVPB 500 milliGRAM(s) IV Intermittent every 8 hours  midodrine 20 milliGRAM(s) Oral every 8 hours  mupirocin 2% Ointment 1 Application(s) Topical every 12 hours  nystatin Powder 1 Application(s) Topical every 12 hours  opium Tincture 2 milliGRAM(s) Oral four times a day  pantoprazole  Injectable 40 milliGRAM(s) IV Push every 12 hours  Phoxillum Filtration BK 4 / 2.5 5000 milliLiter(s) (1200 mL/Hr) CRRT <Continuous>  PrismaSATE Dialysate BGK 4 / 2.5 5000 milliLiter(s) (1500 mL/Hr) CRRT <Continuous>  PrismaSOL Filtration BGK 0 / 2.5 5000 milliLiter(s) (200 mL/Hr) CRRT <Continuous>  QUEtiapine 25 milliGRAM(s) Oral at bedtime  trimethoprim  40 mG/sulfamethoxazole 200 mG Suspension 80 milliGRAM(s) Enteral Tube daily  ursodiol Suspension 300 milliGRAM(s) Oral every 12 hours    MEDICATIONS  (PRN):  albuterol/ipratropium for Nebulization 3 milliLiter(s) Nebulizer every 6 hours PRN Shortness of Breath and/or Wheezing  FIRST- Mouthwash  BLM 10 milliLiter(s) Swish and Spit every 8 hours PRN Mouth Care  oxyCODONE    Solution 5 milliGRAM(s) Oral every 4 hours PRN Severe Pain (7 - 10)  oxyCODONE    Solution 2.5 milliGRAM(s) Oral every 4 hours PRN Moderate Pain (4 - 6)      PAST MEDICAL & SURGICAL HISTORY:  Diabetes      Transaminitis      Paroxysmal atrial fibrillation      Depression      BPH (benign prostatic hyperplasia)      Hypertension      Chronic atrial fibrillation      Coronary artery disease      Hepatocellular carcinoma      DM (diabetes mellitus)      HTN (hypertension)      Paroxysmal atrial fibrillation      Cirrhosis      HCC (hepatocellular carcinoma)      History of BPH      History of laparoscopic cholecystectomy      History of lumbar laminectomy      H/O prior ablation treatment      H/O percutaneous left heart catheterization          Vital Signs Last 24 Hrs  T(C): 36 (08 Jan 2025 13:30), Max: 37.2 (07 Jan 2025 19:00)  T(F): 96.8 (08 Jan 2025 13:30), Max: 99 (07 Jan 2025 19:00)  HR: 82 (08 Jan 2025 13:30) (80 - 94)  BP: 123/60 (08 Jan 2025 13:30) (86/47 - 143/65)  BP(mean): 87 (08 Jan 2025 13:30) (61 - 97)  RR: 28 (08 Jan 2025 13:30) (17 - 31)  SpO2: 100% (08 Jan 2025 13:30) (92% - 100%)    Parameters below as of 08 Jan 2025 11:00  Patient On (Oxygen Delivery Method): ventilator    O2 Concentration (%): 30    I&O's Summary    07 Jan 2025 07:01  -  08 Jan 2025 07:00  --------------------------------------------------------  IN: 4889.4 mL / OUT: 3953 mL / NET: 936.4 mL    08 Jan 2025 07:01  -  08 Jan 2025 14:31  --------------------------------------------------------  IN: 985 mL / OUT: 110 mL / NET: 875 mL                              8.0    3.65  )-----------( 42       ( 08 Jan 2025 13:25 )             24.1     01-08    141  |  107  |  51[H]  ----------------------------<  213[H]  4.3   |  18[L]  |  0.44[L]    Ca    8.9      08 Jan 2025 13:25  Phos  4.3     01-08  Mg     2.4     01-08    TPro  4.2[L]  /  Alb  3.2[L]  /  TBili  16.2[H]  /  DBili  x   /  AST  38  /  ALT  44  /  AlkPhos  81  01-08          Culture - Blood (collected 01-05-25 @ 05:00)  Source: .Blood BLOOD  Preliminary Report (01-08-25 @ 09:01):    No growth at 72 Hours    Culture - Blood (collected 01-05-25 @ 02:20)  Source: .Blood BLOOD  Preliminary Report (01-08-25 @ 09:01):    No growth at 72 Hours    Culture - Blood (collected 01-02-25 @ 00:30)  Source: .Blood BLOOD  Final Report (01-07-25 @ 05:01):    No growth at 5 days      ROS: Unable to assess patient is lethargic, s/p tracheostomy    PHYSICAL EXAM:   Constitutional: trach to vent, awake, unresponsive  Eyes:  PERRLA, Scleral icterus  ENMT: nc/at, no thrush, NGT  Neck: supple, trach   Respiratory: CTA B/L  Cardiovascular: RRR  Gastrointestinal: incision clean/dry/intact + Ostomy pink output stool, wound vac, peritoneal drains x2 bilious   Genitourinary: +scrotal edema w/ large fluid collection; black/green discoloration  Extremities: SCD's in place and working bilaterally, + LE edema  Neurological: trach to vent, opens eyes to voice   Skin: large sacral decub, poor healing with breakdown Transplant Surgery - Multidisciplinary Rounds  --------------------------------------------------------------  OLT   11/15/2024         Colectomy 12/7/2024     IABP 12/7 removed 12/10  Tracheostomy 12/17/2024    Present:   Patient seen and examined with multidisciplinary Transplant team including Surgeon: Dr. Dagher, Hepatologist Dr. Luis, JAZZ Velasquez/Jasmine and bedside RN in AM rounds.   Disciplines not in attendance will be notified of the plan.     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   s/p OLT 11/15 with post op course c/b:  ·	Hypoxia: Reintubated 11/20, extubated 11/24->reintubated 11/24, extubated 11/26, reintubated 12/7, trached 12/17  ·	Ileus   ·	A fib  ·	AMS/Seizure   ·	L brachial DVT  ·	Neutropenia  ·	E coli Bacteremia (12/6)  ·	s/p Colectomy 12/7.   ·	IABP 12/7, removed 12/10  ·	Ec Faecalis UTI (12/16)  ·	Stenotrophamonas in trach aspirate (12/19)  ·	Clostridium in blood 12/23  ·	UGIB 12/26  ·	CMV Viremia  ·	MORAIMA requiring CRRT  ·	Shock liver    Interval/Overnight Events:   - afebrile, VSS  - plex#4 yesterday   - midodrine inc 20 q8h  - ganciclovir inc BID for rising CMV   - off Aldo, lactate 4.5 s/p albumin 250x1  - 2u PRBC/2u Plt with good response   - CRRT clotted, s/p cathflow    Immunosuppression:  - Cyclo per level, MMF HELD, Solu 32  -ongoing monitoring for signs of rejection        MEDICATIONS  (STANDING):  albumin human 25% IVPB 100 milliLiter(s) IV Intermittent every 6 hours  buDESOnide    Inhalation Suspension 0.5 milliGRAM(s) Inhalation every 12 hours  caspofungin IVPB 50 milliGRAM(s) IV Intermittent every 24 hours  caspofungin IVPB      chlorhexidine 0.12% Liquid 15 milliLiter(s) Oral Mucosa every 12 hours  chlorhexidine 2% Cloths 1 Application(s) Topical <User Schedule>  collagenase Ointment 1 Application(s) Topical daily  CRRT Treatment    <Continuous>  cycloSPORINE  , modified (GENGRAF) Solution 75 milliGRAM(s) Oral <User Schedule>  diphenoxylate/atropine 2 Tablet(s) Oral every 6 hours  ganciclovir IVPB 250 milliGRAM(s) IV Intermittent every 12 hours  imipenem/cilastatin  IVPB 500 milliGRAM(s) IV Intermittent every 6 hours  insulin lispro (ADMELOG) corrective regimen sliding scale   SubCutaneous every 6 hours  insulin NPH human recombinant 6 Unit(s) SubCutaneous every 6 hours  loperamide Liquid 4 milliGRAM(s) Enteral Tube every 6 hours  melatonin 5 milliGRAM(s) Oral at bedtime  methylPREDNISolone sodium succinate Injectable 32 milliGRAM(s) IV Push <User Schedule>  metoprolol tartrate 25 milliGRAM(s) Oral every 8 hours  metroNIDAZOLE  IVPB 500 milliGRAM(s) IV Intermittent every 8 hours  midodrine 20 milliGRAM(s) Oral every 8 hours  mupirocin 2% Ointment 1 Application(s) Topical every 12 hours  nystatin Powder 1 Application(s) Topical every 12 hours  opium Tincture 2 milliGRAM(s) Oral four times a day  pantoprazole  Injectable 40 milliGRAM(s) IV Push every 12 hours  Phoxillum Filtration BK 4 / 2.5 5000 milliLiter(s) (1200 mL/Hr) CRRT <Continuous>  PrismaSATE Dialysate BGK 4 / 2.5 5000 milliLiter(s) (1500 mL/Hr) CRRT <Continuous>  PrismaSOL Filtration BGK 0 / 2.5 5000 milliLiter(s) (200 mL/Hr) CRRT <Continuous>  QUEtiapine 25 milliGRAM(s) Oral at bedtime  trimethoprim  40 mG/sulfamethoxazole 200 mG Suspension 80 milliGRAM(s) Enteral Tube daily  ursodiol Suspension 300 milliGRAM(s) Oral every 12 hours    MEDICATIONS  (PRN):  albuterol/ipratropium for Nebulization 3 milliLiter(s) Nebulizer every 6 hours PRN Shortness of Breath and/or Wheezing  FIRST- Mouthwash  BLM 10 milliLiter(s) Swish and Spit every 8 hours PRN Mouth Care  oxyCODONE    Solution 5 milliGRAM(s) Oral every 4 hours PRN Severe Pain (7 - 10)  oxyCODONE    Solution 2.5 milliGRAM(s) Oral every 4 hours PRN Moderate Pain (4 - 6)      PAST MEDICAL & SURGICAL HISTORY:  Diabetes      Transaminitis      Paroxysmal atrial fibrillation      Depression      BPH (benign prostatic hyperplasia)      Hypertension      Chronic atrial fibrillation      Coronary artery disease      Hepatocellular carcinoma      DM (diabetes mellitus)      HTN (hypertension)      Paroxysmal atrial fibrillation      Cirrhosis      HCC (hepatocellular carcinoma)      History of BPH      History of laparoscopic cholecystectomy      History of lumbar laminectomy      H/O prior ablation treatment      H/O percutaneous left heart catheterization          Vital Signs Last 24 Hrs  T(C): 36 (08 Jan 2025 13:30), Max: 37.2 (07 Jan 2025 19:00)  T(F): 96.8 (08 Jan 2025 13:30), Max: 99 (07 Jan 2025 19:00)  HR: 82 (08 Jan 2025 13:30) (80 - 94)  BP: 123/60 (08 Jan 2025 13:30) (86/47 - 143/65)  BP(mean): 87 (08 Jan 2025 13:30) (61 - 97)  RR: 28 (08 Jan 2025 13:30) (17 - 31)  SpO2: 100% (08 Jan 2025 13:30) (92% - 100%)    Parameters below as of 08 Jan 2025 11:00  Patient On (Oxygen Delivery Method): ventilator    O2 Concentration (%): 30    I&O's Summary    07 Jan 2025 07:01  -  08 Jan 2025 07:00  --------------------------------------------------------  IN: 4889.4 mL / OUT: 3953 mL / NET: 936.4 mL    08 Jan 2025 07:01  -  08 Jan 2025 14:31  --------------------------------------------------------  IN: 985 mL / OUT: 110 mL / NET: 875 mL                              8.0    3.65  )-----------( 42       ( 08 Jan 2025 13:25 )             24.1     01-08    141  |  107  |  51[H]  ----------------------------<  213[H]  4.3   |  18[L]  |  0.44[L]    Ca    8.9      08 Jan 2025 13:25  Phos  4.3     01-08  Mg     2.4     01-08    TPro  4.2[L]  /  Alb  3.2[L]  /  TBili  16.2[H]  /  DBili  x   /  AST  38  /  ALT  44  /  AlkPhos  81  01-08          Culture - Blood (collected 01-05-25 @ 05:00)  Source: .Blood BLOOD  Preliminary Report (01-08-25 @ 09:01):    No growth at 72 Hours    Culture - Blood (collected 01-05-25 @ 02:20)  Source: .Blood BLOOD  Preliminary Report (01-08-25 @ 09:01):    No growth at 72 Hours    Culture - Blood (collected 01-02-25 @ 00:30)  Source: .Blood BLOOD  Final Report (01-07-25 @ 05:01):    No growth at 5 days      ROS: Unable to assess patient is lethargic, s/p tracheostomy    PHYSICAL EXAM:   Constitutional: trach to vent, awake, unresponsive  Eyes:  PERRLA, Scleral icterus  ENMT: nc/at, no thrush, NGT  Neck: supple, trach   Respiratory: CTA B/L  Cardiovascular: RRR  Gastrointestinal: incision clean/dry/intact + Ostomy pink output stool, wound vac, peritoneal drains x2 bilious   Genitourinary: +scrotal edema w/ large fluid collection; black/green discoloration  Extremities: SCD's in place and working bilaterally, + LE edema  Neurological: trach to vent, opens eyes to voice   Skin: large sacral decub, poor healing with breakdown

## 2025-01-08 NOTE — PROGRESS NOTE ADULT - ASSESSMENT
74 male w/ a PMHx of HTN, DM, AF, BPH, MASH cirrhosis and HCC s/p OLT on 11/15. Case was uneventful but post-op course has been prolonged and c/b delirium, aspiration, multiple episodes of acute hypoxic respiratory failure requiring reintubation from 11/20-11/24 & 11/24-11/26, AF w/ RVR, ileus requiring NGT, distended colon requiring rectal tube, dysphagia, malnutrition, hypernatremia, fevers, pancytopenia, poorly controlled glucoses, and left brachial VTE. Patient went into severe refractory septic shock on 12/6 w/ blood cultures positive for E. coli s/p s/p ex lap, total abd colectomy, end ileostomy, 3 intentionally retained laparotomy pads for bleeding, Abthera, IABP placement w/ admission to CTICU, Patient required inotropes, Jacqui, and CRRT post-op. s/p closure 12/9. IABP removed 12/10 and transferred back to SICU 12/13. SICU course c/b narrow complex arrythmia w/ ECG showing new ST elevations concerning for posterior wall MI vs vasospasms, cards consulted and started on heparin gtt for empiric ACS tx which was c/b GIB then transitioned to argatroban c/b bleed. TTE revealed LVOT obstruction & TODD.     PLAN:  Neuro:  - Pain: PRN oxy solution  - Opens eyes intermittently, intermittently following commands, off sedation  - CT head 11/19, 11/21, 11/25, 11/29, 12/5 negative  - EEG: Mild diffuse cerebral dysfunction that is not specific in etiology. No epileptic discharges recorded. No seizures recorded.  - Holding home xanax, Abilify, Zoloft, Remeron, Lexapro  - CT head 12/20 showing age-indeterminate infarct, f/u Neurology recs  - No need for MRI     Resp:  - S/p bedside tracheostomy 12/17  - PRVC 14/470/6/30  - trach collar daily, full support overnight  - c/w inhaled bronchodilator  - c/w suctioning PRN    CV:  - Aldo  - 10mg midodrine q8  - home metoprolol 25 q8  - IABP removed 12/10  - TTE 12/6 w/ LVOT obstruction & TODD  - TTE 12/11 shows EF of 55-60%    GI:  - trend LFTs  - NPO, NGT w/ TFs  - lomotil 2 tabs QID,   - protonix BID  - monitor ALYSSA output    /Renal:  - CRRT held 1/4 w plans for possible diuretic challenge  - Monitor BUN/Cr, I&Os, trend UOP  - Monitor scrotal necrosis, maintain nichols at all times  - Bladder scan q12    Heme:  - trend H/H, PLTs, coags  - hep gtt and argatroban gtt held i/s/o bleed  - vitK 5 PO for 3 days (1/3-1/5)  - PLEX started 1/3, last 1/5  - 2 plt, goal >20    ID:  - ABX: caspo, jeniffer, flagyl, imepenem, flagyl  - transplant ppx w/ bactrim  - Ganciclovir for CMV   - immunosuppression w/ cyclosporine  - Tracheal aspirate -> Stenotrophomas maltophilia  - UCx 12/16 positive, Combi cath 12/19 positive  - BCx positive for clostridium paraputrificum 12/28  - Mouthwash for mouth ulcers  - C diff and GI stool PCR negative    Endo:  - monitor glucose   - methylprednisone 32 qd  - iss    Lines:   - NGT   - ALYSSA x 2   - RIJ Babatunde   - DOUG CVC   74 male w/ a PMHx of HTN, DM, AF, BPH, MASH cirrhosis and HCC s/p OLT on 11/15. Case was uneventful but post-op course has been prolonged and c/b delirium, aspiration, multiple episodes of acute hypoxic respiratory failure requiring reintubation from 11/20-11/24 & 11/24-11/26, AF w/ RVR, ileus requiring NGT, distended colon requiring rectal tube, dysphagia, malnutrition, hypernatremia, fevers, pancytopenia, poorly controlled glucoses, and left brachial VTE. Patient went into severe refractory septic shock on 12/6 w/ blood cultures positive for E. coli s/p s/p ex lap, total abd colectomy, end ileostomy, 3 intentionally retained laparotomy pads for bleeding, Abthera, IABP placement w/ admission to CTICU, Patient required inotropes, Jacqui, and CRRT post-op. s/p closure 12/9. IABP removed 12/10 and transferred back to SICU 12/13. SICU course c/b narrow complex arrythmia w/ ECG showing new ST elevations concerning for posterior wall MI vs vasospasms, cards consulted and started on heparin gtt for empiric ACS tx which was c/b GIB then transitioned to argatroban c/b bleed. TTE revealed LVOT obstruction & TODD.     PLAN:  Neuro:  - Pain: PRN oxy solution  - Opens eyes intermittently, intermittently following commands, off sedation  - CT head 11/19, 11/21, 11/25, 11/29, 12/5 negative  - EEG: Mild diffuse cerebral dysfunction that is not specific in etiology. No epileptic discharges recorded. No seizures recorded.  - Holding home xanax, Abilify, Zoloft, Remeron, Lexapro  - CT head 12/20 showing age-indeterminate infarct, f/u Neurology recs  - No need for MRI     Resp:  - S/p bedside tracheostomy 12/17  - PRVC 14/470/6/30  - trach collar daily, full support overnight  - c/w inhaled bronchodilator  - c/w suctioning PRN    CV:  - Aldo  - 10mg midodrine q8  - home metoprolol 25 q8  - IABP removed 12/10  - TTE 12/6 w/ LVOT obstruction & TODD  - TTE 12/11 shows EF of 55-60%    GI:  - trend LFTs  - NPO, NGT w/ TFs  - lomotil 2 tabs QID,   - protonix BID  - Imodium increased to 4mg   - monitor ALYSSA output    /Renal:  - CRRT held 1/4 w plans for possible diuretic challenge  - Monitor BUN/Cr, I&Os, trend UOP  - Monitor scrotal necrosis, maintain nichols at all times  - Bladder scan q12    Heme:  - trend H/H, PLTs, coags  - hep gtt and argatroban gtt held i/s/o bleed  - vitK 5 PO for 3 days (1/3-1/5)  - PLEX started 1/3, last 1/5  - 2 plt, goal >20    ID:  - ABX: caspo, jeniffer, flagyl, imepenem, flagyl  - transplant ppx w/ bactrim  - Ganciclovir for CMV   - immunosuppression w/ cyclosporine  - Tracheal aspirate -> Stenotrophomas maltophilia  - UCx 12/16 positive, Combi cath 12/19 positive  - BCx positive for clostridium paraputrificum 12/28  - Mouthwash for mouth ulcers  - C diff and GI stool PCR negative    Endo:  - monitor glucose   - methylprednisone 32 qd  - iss    Lines:   - NGT   - ALYSSA x 2   - BLANCO CAMACHO CVC

## 2025-01-08 NOTE — PROVIDER CONTACT NOTE (OTHER) - ACTION/TREATMENT ORDERED:
same as above
EKG. Trops. Provider Assessed patient at bedside
JAZZ Wright says he will order cathflo for the blue lumen on the shiley, waiting for orders at this time
ECG// stat dose of metoprolol 12.5mg po// increased BID dose to 25 mg
Put ice packs on patient and try to bring down fever
SICU Team aware- ativan given as per order
EKG performed, full set of labs sent, cardiology consulted, standing metoprolol given.
JAZZ Wright says that the day team will manipulate the left IJ shiley and try to fix it, shiley may need to be replaced, CRRT remains off during this shift due to access issue not resolved

## 2025-01-08 NOTE — PROGRESS NOTE ADULT - ATTENDING COMMENTS
74 yr M w liver failure, s/p OLT in nov 24 now w trach 12/17, bacteremia, return to OR, total colectomy, ileostomy creation, cardiogenic shock, oliguric renal failure requiring CRRT    ON: poor sleep    deconditioned, opens eyes not following today   add seroquel for sleep  CPAP 5/5 30%, TC 4-6 hours today   portex 7 trach  AM CXR persistent bibasilar pl effusions  persistent met acidosis gas  off kassandra   on home meto 25p q8hr  midodrine 20 q8hrs  fluctuating lactate, likely liver dysfunction related  TTE 12/11 EF 55%  NPO  NG tube feeds at goals  lomotil, imodium, ostomy 2.2 lit, banana flakes, add tincture of opium   LFTs worsening, plasmapheresis yesterday, next 1/9 tentative  IVIG per transplant  bulb 300 lit output in total , wound vac  anuric, net balance even  albumin boluses q6hr  CRRT net even for now  hb stable, severe thrombocytopenia, requiring frequent transfusions  no chem VTE PPX  caspo, flagyl, imipenem gancyclovir per ID transplant  combicath steno, bcx clostridium, e fecal from abd  afebrile overnight  glycemic control w ins nph  prednisone for immunosuppresion  family at bedside updated  PT when able    rt IJ HD, left IJ CVC, plan to replace with trialysis cath

## 2025-01-08 NOTE — ADVANCED PRACTICE NURSE CONSULT - RECOMMEDATIONS
Will recommend:  1. Monitor output.  2. Empty pouch when 1/3-1/2 full   3. Change pouching system every 3-4 days & prn leakage  4. Contact ostomy specialists if questions, concerns/issues .  5. Supplies: Swink 2 1/4" Ceraplus flat skin barrier (#27774), Lianne 2 1/4" drainable pouch (#33940); Accessory products:  stoma paste #[id number], stoma powder (#7699) & Cavilon No sting barrier film wipe (#1626)  Will f/u for ostomy education when appropriate. Pt remains acute, requiring SICU care.

## 2025-01-08 NOTE — PROVIDER CONTACT NOTE (OTHER) - REASON
JAZZ Wright came to bedside at 0400 to administer cathflo for shiley into blue lumen
Low blood pressure
Pt alarming for vtach
access issues on blue lumen on left IJ Babatunde
patient c/o anxiety and agitation- SICU Team aware and 0.5mg ativan given as per order
Tachycardic and Febrile
Pt had c/o of Chest Pain
pt has co/ of CP with ST depression

## 2025-01-08 NOTE — PROVIDER CONTACT NOTE (OTHER) - DATE AND TIME:
08-Jan-2025 05:08
19-Dec-2024 20:42
03-Dec-2024 21:00
06-Dec-2024 04:00
01-Dec-2024 06:25
07-Jan-2025 22:00
03-Dec-2024 07:15
24-Nov-2024 08:15

## 2025-01-08 NOTE — PROCEDURE NOTE - NSCHLORHEXIDINEBATH_GEN_A_CORE
To be discontinued when line removed
2% wipes
2% wipes
To be discontinued when line removed
2% wipes
2% wipes/To be discontinued when line removed

## 2025-01-08 NOTE — PROCEDURE NOTE - NSINFORMCONSENT_GEN_A_CORE
Exchange attempted over R IJ wire. Wire would not pass despite pullback and flush. Decision made to procedure with new insertion./This was an emergent procedure. Exchange attempted over R IJ wire. Wire would not pass despite pullback and flush. Decision made to proceed with new insertion./This was an emergent procedure.

## 2025-01-08 NOTE — PROGRESS NOTE ADULT - ASSESSMENT
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   s/p OLT 11/15 with complicated post op course    [] Septic shock/Cardiogenic shock  [] s/p Colectomy 12/7   [] RTOR for closure and Ileostomy creation   [] IAPB 12/7- removed 12/10  -> E Coli Bacteremia 12/6  -> Ec faecalis UTI (12/16)  -> Stenotrophaonas in trach apirate (12/19)  -> Ec Faecalis in Asc fluid (12/20) (12/26)  -> Clostridium paraputrifucum in blood 12/23, cultures have since cleared  - Tx ID following - on Imipenem/Caspo/Flagyl/Bactrim DS   - 1/1 CT CAP: small pleural effusions, enteritis, rest ok  - all lines changed over 1/3  - Wound care for sacral decubitus ulcer- add collagenase     [] MORAIMA:   -CRRT started 12/7, stopped 12/15, resumed CRRT 12/23    [ ] UGIB s/p EGD (12/26) showing generalized oozing, coagulopathy  - Correct coagulopathy per SICU  - Protonix IV BID  - TF at goal  - Vitamin K 3 days ends 1/5/25    [] s/p OLT  - poor graft function, trial of plasmapheresis 1/3 for (3-5 days), PLEX 1/7  - repeat liver US unchanged  - Diet: TFs to goal  - Pain management: avoid narcotics  - Strict I&Os, wound vac placed 12/10   - US: L brachial DVT (holding A/C)  - wound vac care  - ursodiol 300mgBID     [ ] Immunosuppression  - Tacrolimus stopped 11/18 in setting of AMS/Seizure, Started Cyclo 12/17  - Cyclo per level, MMF HELD, Solumedrol 32 mg daily    [] lleus -- resolving  -@ home on Linzess  - imaging with distended colon, improving, (likely opioid induced)  - s/p Neostigmine x 2  - s/p Relistor, last dose 12/1  - s/p colectomy with end ileostomy  (see above)  - ileostomy with >1L output, lomotil and imodium as needed, and change tube feeds  - Colorectal following  - replace losses as able    [] CMV viremia  - CMV PCR (12/11 and 12/16): neg, positive CMV PCR on 12/23  - PPx: Gancyclovir (HELD) in setting of thrombocytopenia initially due to low PLT  - CMV viral load 537 12/31, will attempt Ganciclovir again, next check 1/7    [ ] AMS  - Reintubated 11/20 Aspiration/hypoxia, extubated 11/24->[id number]/24--> extubated 11/26 -> [id number] -> tracheostomy 12/17 -> trach collar trials starting 12/29  - EEG 11/30 negative, Neurology following  - BH following   - off tacro  - on keppra  -CT Head No Cont (12/20): Age-indeterminate posterior left frontal lobe infarct.   -CT Head (12/25): Evolving subacute infarct in the left supramarginal gyrus  -repeat CTH ok, poor MS   - melatonin QHS     [ ] HTN/ pAFib  [ ] coronary vasospasm vs posterior wall MI  - remains off all a/c, failed hep gtt/argatroban due to UGIB  - Cards- plan for PCI prior to DC   - Off Amiodarone, off dobutamine  - BB as needed  - Dr. 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   s/p OLT 11/15 with complicated post op course    [] Septic shock/Cardiogenic shock  [] s/p Colectomy 12/7   [] RTOR for closure and Ileostomy creation   [] IAPB 12/7- removed 12/10  -> E Coli Bacteremia 12/6  -> Ec faecalis UTI (12/16)  -> Stenotrophaonas in trach apirate (12/19)  -> Ec Faecalis in Asc fluid (12/20) (12/26)  -> Clostridium paraputrifucum in blood 12/23, cultures have since cleared  - Tx ID following - on Imipenem/Caspo/Flagyl/Bactrim DS   - 1/1 CT CAP: small pleural effusions, enteritis, rest ok  - all lines changed over 1/3  - Wound care for sacral decubitus ulcer- add collagenase   - midodrine inc 20 q8h    [] MORAIMA:   -CRRT started 12/7, stopped 12/15, resumed CRRT 12/23      [ ] UGIB s/p EGD (12/26) showing generalized oozing, coagulopathy  - Correct coagulopathy per SICU  - Protonix IV BID  - TF at goal  - Vitamin K 3 days ends 1/5/25    [] s/p OLT  - poor graft function, trial of plasmapheresis 1/3, 1/5, 1/7 for (3-5 days), PLEX 1/9  - IVIG x1  - repeat liver US unchanged  - Diet: TFs to goal  - Pain management: avoid narcotics  - Strict I&Os, wound vac placed 12/10   - US: L brachial DVT (holding A/C)  - wound vac care  - ursodiol 300mgBID   - d/c R peritoneal drain    [ ] Immunosuppression  - Tacrolimus stopped 11/18 in setting of AMS/Seizure, Started Cyclo 12/17  - Cyclo per level, MMF HELD, Solumedrol 32 mg daily    [] lleus -- resolving  -@ home on Linzess  - imaging with distended colon, improving, (likely opioid induced)  - s/p Neostigmine x 2  - s/p Relistor, last dose 12/1  - s/p colectomy with end ileostomy  (see above)  - ileostomy with >1L output, lomotil and imodium as needed, and change tube feeds  - Colorectal following  - replace losses as able    [] CMV viremia  - CMV PCR (12/11 and 12/16): neg, positive CMV PCR on 12/23  - CMV viral load incr from 537 to 1240 1/6; ganciclovir inc to BID     [ ] AMS  - Reintubated 11/20 Aspiration/hypoxia, extubated 11/24->[id number]/24--> extubated 11/26 -> [id number]/7 -> tracheostomy 12/17 -> trach collar trials starting 12/29  - EEG 11/30 negative, Neurology following  - BH following   - off tacro  - on keppra  -CT Head No Cont (12/20): Age-indeterminate posterior left frontal lobe infarct.   -CT Head (12/25): Evolving subacute infarct in the left supramarginal gyrus  -repeat CTH ok, poor MS   - melatonin QHS   - Seroquel 25mg QD per psych    [ ] HTN/ pAFib  [ ] coronary vasospasm vs posterior wall MI  - remains off all a/c, failed hep gtt/argatroban due to UGIB  - Cards- plan for PCI prior to DC   - Off Amiodarone, off dobutamine  - BB as needed  - Dr. 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   s/p OLT 11/15 with complicated post op course    [] Septic shock/Cardiogenic shock  [] s/p Colectomy 12/7   [] RTOR for closure and Ileostomy creation   [] IAPB 12/7- removed 12/10  -> E Coli Bacteremia 12/6  -> Ec faecalis UTI (12/16)  -> Stenotrophaonas in trach apirate (12/19)  -> Ec Faecalis in Asc fluid (12/20) (12/26)  -> Clostridium paraputrifucum in blood 12/23, cultures have since cleared  - Tx ID following - on Imipenem/Caspo/Flagyl/Bactrim DS   - 1/1 CT CAP: small pleural effusions, enteritis, rest ok  - all lines changed over 1/3  - Wound care for sacral decubitus ulcer- add collagenase   - midodrine inc 20 q8h    [] MORAIMA:   -CRRT started 12/7, stopped 12/15, resumed CRRT 12/23  - still anuric       [ ] UGIB s/p EGD (12/26) showing generalized oozing, coagulopathy  - Correct coagulopathy per SICU  - Protonix IV BID  - TF at goal  - Vitamin K 3 days ends 1/5/25    [] s/p OLT  - poor graft function, trial of plasmapheresis 1/3, 1/5, 1/7 for (3-5 days), PLEX #4 1/9  - IVIG #1 x1  - repeat liver US unchanged  - Diet: TFs to goal  - Pain management: avoid narcotics  - Strict I&Os, wound vac placed 12/10   - US: L brachial DVT (holding A/C)  - wound vac care  - ursodiol 300mgBID   - d/c R peritoneal drain    [ ] Immunosuppression  - Tacrolimus stopped 11/18 in setting of AMS/Seizure, Started Cyclo 12/17  - Cyclo per level, MMF HELD, Solumedrol 32 mg daily    [] lleus -- resolving  -@ home on Linzess  - imaging with distended colon, improving, (likely opioid induced)  - s/p Neostigmine x 2  - s/p Relistor, last dose 12/1  - s/p colectomy with end ileostomy  (see above)  - ileostomy with >1L output, lomotil and imodium as needed, and change tube feeds  - Colorectal following  - replace losses as able    [] CMV viremia  - CMV PCR (12/11 and 12/16): neg, positive CMV PCR on 12/23  - CMV viral load incr from 537 to 1240 1/6; ganciclovir inc to BID     [ ] AMS  - Reintubated 11/20 Aspiration/hypoxia, extubated 11/24->[id number]/24--> extubated 11/26 -> [id number]/7 -> tracheostomy 12/17 -> trach collar trials starting 12/29  - EEG 11/30 negative, Neurology following  - BH following   - off tacro  - on keppra  -CT Head No Cont (12/20): Age-indeterminate posterior left frontal lobe infarct.   -CT Head (12/25): Evolving subacute infarct in the left supramarginal gyrus  -repeat CTH ok, poor MS   - melatonin QHS   - Seroquel 25mg QD per psych    [ ] HTN/ pAFib  [ ] coronary vasospasm vs posterior wall MI  - remains off all a/c, failed hep gtt/argatroban due to UGIB  - Cards- plan for PCI prior to DC   - Off Amiodarone, off dobutamine  - BB as needed  - Dr. Quintanilla following    [ ] DM  - per SICU     [] Scrotal abscess   - US (12/12): 2.1 x0.9 x 3.2 cm focal, right testicle- possible abscess (12/12)  - Urology recs: CT scrotum review no debridement required  - Urology recs Derm consult for guidance on wound care   - 12/23 US doppler scrotum: no arterial flow, limited venous flow, bl hydrocele  - 12/15 CT CAP no acute changes   - Elevate scrotum w/ support Urology signed off 12/29

## 2025-01-08 NOTE — PROGRESS NOTE ADULT - SUBJECTIVE AND OBJECTIVE BOX
24 HOUR EVENTS:  - NPH increased to 6 U  - midodrine increased to 20  - 1u plt for plt count 16  - 1u prbc  - plex   - melatonin added    NEURO  Exam: axox3  Meds: melatonin 5 milliGRAM(s) Oral at bedtime  oxyCODONE    Solution 5 milliGRAM(s) Oral every 4 hours PRN Severe Pain (7 - 10)  oxyCODONE    Solution 2.5 milliGRAM(s) Oral every 4 hours PRN Moderate Pain (4 - 6)      RESPIRATORY  RR: 21 (01-08-25 @ 01:00) (17 - 31)  SpO2: 98% (01-08-25 @ 03:29) (92% - 100%)  Wt(kg): --  Exam: Lungs CTA b/l  Mechanical Ventilation: Mode: AC/ CMV (Assist Control/ Continuous Mandatory Ventilation), RR (machine): 14, RR (patient): 27, TV (machine): 470, FiO2: 30, PEEP: 6, ITime: 0.9, MAP: 9.6, PIP: 19    Meds: albuterol/ipratropium for Nebulization 3 milliLiter(s) Nebulizer every 6 hours PRN Shortness of Breath and/or Wheezing  buDESOnide    Inhalation Suspension 0.5 milliGRAM(s) Inhalation every 12 hours      CARDIOVASCULAR  HR: 83 (01-08-25 @ 03:29) (80 - 99)  BP: 109/59 (01-08-25 @ 01:00) (80/53 - 143/65)  BP(mean): 81 (01-08-25 @ 01:00) (61 - 97)  ABP: --  ABP(mean): --  Wt(kg): --  CVP(cm H2O): --  VBG - ( 07 Jan 2025 23:34 )  pH: 7.34  /  pCO2: 42    /  pO2: 44    / HCO3: 23    / Base Excess: -2.9  /  SaO2: 76.8   Lactate: 4.1                Exam: Normal S1/S2 w/o murmurs or rubs  Cardiac Rhythm:   Perfusion     [x ]Adequate   [ ]Inadequate  Mentation   [ x]Normal       [ ]Reduced  Extremities  [x ]Warm         [ ]Cool  Meds: metoprolol tartrate 25 milliGRAM(s) Oral every 8 hours  midodrine 20 milliGRAM(s) Oral every 8 hours  phenylephrine    Infusion 0.1 MICROgram(s)/kG/Min IV Continuous <Continuous>      GI/NUTRITION  Exam: +mild distention  Diet: tube feeds  Last Bowel Movement: 07-Jan-2025 (01-07-25 @ 19:00)  Last Bowel Movement: 06-Jan-2025 (01-06-25 @ 19:00)  Last Bowel Movement: 05-Jan-2025 (01-05-25 @ 19:00)  Last Bowel Movement: 05-Jan-2025 (01-05-25 @ 07:00)  Last Bowel Movement: 04-Jan-2025 (01-04-25 @ 19:00)  Last Bowel Movement: 04-Jan-2025 (01-04-25 @ 07:00)  Last Bowel Movement: 03-Jan-2025 (01-03-25 @ 19:00)  Last Bowel Movement: 02-Jan-2025 (01-02-25 @ 19:00)  Last Bowel Movement: 02-Jan-2025 (01-02-25 @ 07:00)  Last Bowel Movement: 01-Jan-2025 (01-01-25 @ 19:00)  Last Bowel Movement: 01-Jan-2025 (01-01-25 @ 07:00)  Last Bowel Movement: 31-Dec-2024 (12-31-24 @ 19:00)  Last Bowel Movement: 31-Dec-2024 (12-31-24 @ 07:00)  Last Bowel Movement: 30-Dec-2024 (12-30-24 @ 19:00)  Last Bowel Movement: 30-Dec-2024 (12-30-24 @ 07:00)  Last Bowel Movement: 29-Dec-2024 (12-29-24 @ 19:00)  Last Bowel Movement: 28-Dec-2024 (12-28-24 @ 07:00)  Last Bowel Movement: 26-Dec-2024 (12-26-24 @ 19:15)  Last Bowel Movement: 25-Dec-2024 (12-25-24 @ 19:00)    Meds: diphenoxylate/atropine 2 Tablet(s) Oral every 6 hours  loperamide Liquid 4 milliGRAM(s) Enteral Tube every 6 hours  pantoprazole  Injectable 40 milliGRAM(s) IV Push every 12 hours  ursodiol Suspension 300 milliGRAM(s) Oral every 12 hours      GENITOURINARY  I&O's Detail    01-06 @ 07:01  -  01-07 @ 07:00  --------------------------------------------------------  IN:    Albumin 25%  -  50 mL: 200 mL    Enteral Tube Flush: 330 mL    IV PiggyBack: 100 mL    IV PiggyBack: 950 mL    Phenylephrine: 123.3 mL    Platelets - Single Donor: 450 mL    Vital1.5: 1560 mL  Total IN: 3713.3 mL    OUT:    Bulb (mL): 845 mL    Bulb (mL): 525 mL    Ileostomy (mL): 1425 mL    Indwelling Catheter - Urethral (mL): 30 mL    Other (mL): 1115 mL    VAC (Vacuum Assisted Closure) System (mL): 0 mL  Total OUT: 3940 mL    Total NET: -226.7 mL      01-07 @ 07:01 - 01-08 @ 04:01  --------------------------------------------------------  IN:    Albumin 25%  -  50 mL: 150 mL    Albumin 5%  - 250 mL: 250 mL    Enteral Tube Flush: 230 mL    IV PiggyBack: 850 mL    IV PiggyBack: 100 mL    Phenylephrine: 149.4 mL    Platelets - Single Donor: 450 mL    PRBCs (Packed Red Blood Cells): 300 mL    Vital1.5: 1170 mL  Total IN: 3649.4 mL    OUT:    Bulb (mL): 205 mL    Bulb (mL): 5 mL    Ileostomy (mL): 1825 mL    Indwelling Catheter - Urethral (mL): 20 mL    Other (mL): 1328 mL    VAC (Vacuum Assisted Closure) System (mL): 0 mL  Total OUT: 3383 mL    Total NET: 266.4 mL          01-07    140  |  106  |  39[H]  ----------------------------<  201[H]  4.2   |  20[L]  |  0.36[L]    Ca    8.7      07 Jan 2025 23:51  Phos  3.8     01-07  Mg     2.3     01-07    TPro  4.1[L]  /  Alb  3.0[L]  /  TBili  14.1[H]  /  DBili  x   /  AST  41[H]  /  ALT  42  /  AlkPhos  72  01-07    Meds: albumin human 25% IVPB 100 milliLiter(s) IV Intermittent every 6 hours      HEMATOLOGIC  Meds: alteplase for catheter clearance 2 milliGRAM(s) Catheter once                          7.9    3.38  )-----------( 26       ( 07 Jan 2025 23:51 )             23.8     PT/INR - ( 07 Jan 2025 23:51 )   PT: 22.7 sec;   INR: 1.99 ratio         PTT - ( 07 Jan 2025 23:51 )  PTT:35.0 sec    INFECTIOUS DISEASES  T(C): 36.9 (01-07-25 @ 23:00), Max: 37.3 (01-07-25 @ 06:00)  Wt(kg): --  WBC Count: 3.38 K/uL (01-07 @ 23:51)  WBC Count: 3.77 K/uL (01-07 @ 20:46)  WBC Count: 4.47 K/uL (01-07 @ 17:50)  WBC Count: 6.84 K/uL (01-07 @ 12:19)  WBC Count: 6.34 K/uL (01-07 @ 06:08)    Recent Cultures:  Specimen Source: .Blood BLOOD, 01-05 @ 05:00; Results   No growth at 48 Hours; Gram Stain: --; Organism: --  Specimen Source: .Blood BLOOD, 01-05 @ 02:20; Results   No growth at 48 Hours; Gram Stain: --; Organism: --  Specimen Source: .Blood BLOOD, 01-02 @ 00:30; Results   No growth at 5 days; Gram Stain: --; Organism: --    Meds: caspofungin IVPB 50 milliGRAM(s) IV Intermittent every 24 hours  caspofungin IVPB      cycloSPORINE  , modified (GENGRAF) Solution 75 milliGRAM(s) Oral <User Schedule>  ganciclovir IVPB 250 milliGRAM(s) IV Intermittent every 12 hours  imipenem/cilastatin  IVPB 500 milliGRAM(s) IV Intermittent every 6 hours  metroNIDAZOLE  IVPB 500 milliGRAM(s) IV Intermittent every 8 hours  trimethoprim  40 mG/sulfamethoxazole 200 mG Suspension 80 milliGRAM(s) Enteral Tube daily      ENDOCRINE  Capillary Blood Glucose    Meds: insulin lispro (ADMELOG) corrective regimen sliding scale   SubCutaneous every 6 hours  insulin NPH human recombinant 6 Unit(s) SubCutaneous every 6 hours  methylPREDNISolone sodium succinate Injectable 32 milliGRAM(s) IV Push <User Schedule>      OTHER MEDICATIONS:  chlorhexidine 0.12% Liquid 15 milliLiter(s) Oral Mucosa every 12 hours  chlorhexidine 2% Cloths 1 Application(s) Topical <User Schedule>  collagenase Ointment 1 Application(s) Topical daily  CRRT Treatment    <Continuous>  FIRST- Mouthwash  BLM 10 milliLiter(s) Swish and Spit every 8 hours PRN  mupirocin 2% Ointment 1 Application(s) Topical every 12 hours  nystatin Powder 1 Application(s) Topical every 12 hours  Phoxillum Filtration BK 4 / 2.5 5000 milliLiter(s) CRRT <Continuous>  Phoxillum Filtration BK 4 / 2.5 5000 milliLiter(s) CRRT <Continuous>  PrismaSATE Dialysate BGK 4 / 2.5 5000 milliLiter(s) CRRT <Continuous>      IMAGING: 24 HOUR EVENTS:  - NPH increased to 6 U  - midodrine increased to 20  - 1u plt for plt count 16  - 1u prbc  - plex   - melatonin added  - Imodium increased to 4mg     NEURO  Exam: axox3  Meds: melatonin 5 milliGRAM(s) Oral at bedtime  oxyCODONE    Solution 5 milliGRAM(s) Oral every 4 hours PRN Severe Pain (7 - 10)  oxyCODONE    Solution 2.5 milliGRAM(s) Oral every 4 hours PRN Moderate Pain (4 - 6)      RESPIRATORY  RR: 21 (01-08-25 @ 01:00) (17 - 31)  SpO2: 98% (01-08-25 @ 03:29) (92% - 100%)  Wt(kg): --  Exam: Lungs CTA b/l  Mechanical Ventilation: Mode: AC/ CMV (Assist Control/ Continuous Mandatory Ventilation), RR (machine): 14, RR (patient): 27, TV (machine): 470, FiO2: 30, PEEP: 6, ITime: 0.9, MAP: 9.6, PIP: 19    Meds: albuterol/ipratropium for Nebulization 3 milliLiter(s) Nebulizer every 6 hours PRN Shortness of Breath and/or Wheezing  buDESOnide    Inhalation Suspension 0.5 milliGRAM(s) Inhalation every 12 hours      CARDIOVASCULAR  HR: 83 (01-08-25 @ 03:29) (80 - 99)  BP: 109/59 (01-08-25 @ 01:00) (80/53 - 143/65)  BP(mean): 81 (01-08-25 @ 01:00) (61 - 97)  ABP: --  ABP(mean): --  Wt(kg): --  CVP(cm H2O): --  VBG - ( 07 Jan 2025 23:34 )  pH: 7.34  /  pCO2: 42    /  pO2: 44    / HCO3: 23    / Base Excess: -2.9  /  SaO2: 76.8   Lactate: 4.1                Exam: Normal S1/S2 w/o murmurs or rubs  Cardiac Rhythm:   Perfusion     [x ]Adequate   [ ]Inadequate  Mentation   [ x]Normal       [ ]Reduced  Extremities  [x ]Warm         [ ]Cool  Meds: metoprolol tartrate 25 milliGRAM(s) Oral every 8 hours  midodrine 20 milliGRAM(s) Oral every 8 hours  phenylephrine    Infusion 0.1 MICROgram(s)/kG/Min IV Continuous <Continuous>      GI/NUTRITION  Exam: +mild distention  Diet: tube feeds  Last Bowel Movement: 07-Jan-2025 (01-07-25 @ 19:00)  Last Bowel Movement: 06-Jan-2025 (01-06-25 @ 19:00)  Last Bowel Movement: 05-Jan-2025 (01-05-25 @ 19:00)  Last Bowel Movement: 05-Jan-2025 (01-05-25 @ 07:00)  Last Bowel Movement: 04-Jan-2025 (01-04-25 @ 19:00)  Last Bowel Movement: 04-Jan-2025 (01-04-25 @ 07:00)  Last Bowel Movement: 03-Jan-2025 (01-03-25 @ 19:00)  Last Bowel Movement: 02-Jan-2025 (01-02-25 @ 19:00)  Last Bowel Movement: 02-Jan-2025 (01-02-25 @ 07:00)  Last Bowel Movement: 01-Jan-2025 (01-01-25 @ 19:00)  Last Bowel Movement: 01-Jan-2025 (01-01-25 @ 07:00)  Last Bowel Movement: 31-Dec-2024 (12-31-24 @ 19:00)  Last Bowel Movement: 31-Dec-2024 (12-31-24 @ 07:00)  Last Bowel Movement: 30-Dec-2024 (12-30-24 @ 19:00)  Last Bowel Movement: 30-Dec-2024 (12-30-24 @ 07:00)  Last Bowel Movement: 29-Dec-2024 (12-29-24 @ 19:00)  Last Bowel Movement: 28-Dec-2024 (12-28-24 @ 07:00)  Last Bowel Movement: 26-Dec-2024 (12-26-24 @ 19:15)  Last Bowel Movement: 25-Dec-2024 (12-25-24 @ 19:00)    Meds: diphenoxylate/atropine 2 Tablet(s) Oral every 6 hours  loperamide Liquid 4 milliGRAM(s) Enteral Tube every 6 hours  pantoprazole  Injectable 40 milliGRAM(s) IV Push every 12 hours  ursodiol Suspension 300 milliGRAM(s) Oral every 12 hours      GENITOURINARY  I&O's Detail    01-06 @ 07:01  -  01-07 @ 07:00  --------------------------------------------------------  IN:    Albumin 25%  -  50 mL: 200 mL    Enteral Tube Flush: 330 mL    IV PiggyBack: 100 mL    IV PiggyBack: 950 mL    Phenylephrine: 123.3 mL    Platelets - Single Donor: 450 mL    Vital1.5: 1560 mL  Total IN: 3713.3 mL    OUT:    Bulb (mL): 845 mL    Bulb (mL): 525 mL    Ileostomy (mL): 1425 mL    Indwelling Catheter - Urethral (mL): 30 mL    Other (mL): 1115 mL    VAC (Vacuum Assisted Closure) System (mL): 0 mL  Total OUT: 3940 mL    Total NET: -226.7 mL      01-07 @ 07:01 - 01-08 @ 04:01  --------------------------------------------------------  IN:    Albumin 25%  -  50 mL: 150 mL    Albumin 5%  - 250 mL: 250 mL    Enteral Tube Flush: 230 mL    IV PiggyBack: 850 mL    IV PiggyBack: 100 mL    Phenylephrine: 149.4 mL    Platelets - Single Donor: 450 mL    PRBCs (Packed Red Blood Cells): 300 mL    Vital1.5: 1170 mL  Total IN: 3649.4 mL    OUT:    Bulb (mL): 205 mL    Bulb (mL): 5 mL    Ileostomy (mL): 1825 mL    Indwelling Catheter - Urethral (mL): 20 mL    Other (mL): 1328 mL    VAC (Vacuum Assisted Closure) System (mL): 0 mL  Total OUT: 3383 mL    Total NET: 266.4 mL          01-07    140  |  106  |  39[H]  ----------------------------<  201[H]  4.2   |  20[L]  |  0.36[L]    Ca    8.7      07 Jan 2025 23:51  Phos  3.8     01-07  Mg     2.3     01-07    TPro  4.1[L]  /  Alb  3.0[L]  /  TBili  14.1[H]  /  DBili  x   /  AST  41[H]  /  ALT  42  /  AlkPhos  72  01-07    Meds: albumin human 25% IVPB 100 milliLiter(s) IV Intermittent every 6 hours      HEMATOLOGIC  Meds: alteplase for catheter clearance 2 milliGRAM(s) Catheter once                          7.9    3.38  )-----------( 26       ( 07 Jan 2025 23:51 )             23.8     PT/INR - ( 07 Jan 2025 23:51 )   PT: 22.7 sec;   INR: 1.99 ratio         PTT - ( 07 Jan 2025 23:51 )  PTT:35.0 sec    INFECTIOUS DISEASES  T(C): 36.9 (01-07-25 @ 23:00), Max: 37.3 (01-07-25 @ 06:00)  Wt(kg): --  WBC Count: 3.38 K/uL (01-07 @ 23:51)  WBC Count: 3.77 K/uL (01-07 @ 20:46)  WBC Count: 4.47 K/uL (01-07 @ 17:50)  WBC Count: 6.84 K/uL (01-07 @ 12:19)  WBC Count: 6.34 K/uL (01-07 @ 06:08)    Recent Cultures:  Specimen Source: .Blood BLOOD, 01-05 @ 05:00; Results   No growth at 48 Hours; Gram Stain: --; Organism: --  Specimen Source: .Blood BLOOD, 01-05 @ 02:20; Results   No growth at 48 Hours; Gram Stain: --; Organism: --  Specimen Source: .Blood BLOOD, 01-02 @ 00:30; Results   No growth at 5 days; Gram Stain: --; Organism: --    Meds: caspofungin IVPB 50 milliGRAM(s) IV Intermittent every 24 hours  caspofungin IVPB      cycloSPORINE  , modified (GENGRAF) Solution 75 milliGRAM(s) Oral <User Schedule>  ganciclovir IVPB 250 milliGRAM(s) IV Intermittent every 12 hours  imipenem/cilastatin  IVPB 500 milliGRAM(s) IV Intermittent every 6 hours  metroNIDAZOLE  IVPB 500 milliGRAM(s) IV Intermittent every 8 hours  trimethoprim  40 mG/sulfamethoxazole 200 mG Suspension 80 milliGRAM(s) Enteral Tube daily      ENDOCRINE  Capillary Blood Glucose    Meds: insulin lispro (ADMELOG) corrective regimen sliding scale   SubCutaneous every 6 hours  insulin NPH human recombinant 6 Unit(s) SubCutaneous every 6 hours  methylPREDNISolone sodium succinate Injectable 32 milliGRAM(s) IV Push <User Schedule>      OTHER MEDICATIONS:  chlorhexidine 0.12% Liquid 15 milliLiter(s) Oral Mucosa every 12 hours  chlorhexidine 2% Cloths 1 Application(s) Topical <User Schedule>  collagenase Ointment 1 Application(s) Topical daily  CRRT Treatment    <Continuous>  FIRST- Mouthwash  BLM 10 milliLiter(s) Swish and Spit every 8 hours PRN  mupirocin 2% Ointment 1 Application(s) Topical every 12 hours  nystatin Powder 1 Application(s) Topical every 12 hours  Phoxillum Filtration BK 4 / 2.5 5000 milliLiter(s) CRRT <Continuous>  Phoxillum Filtration BK 4 / 2.5 5000 milliLiter(s) CRRT <Continuous>  PrismaSATE Dialysate BGK 4 / 2.5 5000 milliLiter(s) CRRT <Continuous>      IMAGING:

## 2025-01-08 NOTE — PROVIDER CONTACT NOTE (OTHER) - BACKGROUND
Patient is a 64yo M with a PMH of HTN,HLD, DMII, Afib, BPH, MASH cirrhosis and status post Liver transplant
pt has been having CP intermittently over past few days.
pt  prior to getting OOB.  in chair
s/p liver transplant
CRRT was restarted at 3524
pt on CRRT net even
s/p OLT 11/15. Patient's post-op course has been c/b multiple reintubation, required total colectomy w/ ileostomy, and IABP placement 2/2 cardiogenic shock with subsequent removal.

## 2025-01-08 NOTE — PROVIDER CONTACT NOTE (OTHER) - ASSESSMENT
pt tachycardic, and anxious, son at bedside, pt A&OX4, lethargic, SICU Team aware
pt on CRRT for 30 minutes before the blue lumen started giving difficulty, the blue lumen is flushing without difficulty but when you draw back it feels like it is hitting a valve because blood flow stops
All other VS stable. Pt eyes open and alert.
Patient is Tachycardic to 130s, febrile to 38.1 and is shaking from being cold
c/o of CP with 5 lead ECG showing ST depressions.  BP stable   Hr 130  o2 >97% on RA
PT   BP stable  c/o of CP
received lopressor 5 mg

## 2025-01-08 NOTE — PROGRESS NOTE ADULT - SUBJECTIVE AND OBJECTIVE BOX
[facility], [place] DIVISION OF KIDNEY DISEASES AND HYPERTENSION -- FOLLOW UP NOTE  --------------------------------------------------------------------------------  Chief Complaint: Oliguric MORAIMA in OLT transplant     24 hour events/subjective: Pt. seen and examined at bedside earlier today. Pt. remains on trach and on phenyl ephrine. CRRT stopped due to catheter malfunction.         PAST HISTORY  --------------------------------------------------------------------------------  No significant changes to PMH, PSH, FHx, SHx, unless otherwise noted    ALLERGIES & MEDICATIONS  --------------------------------------------------------------------------------  Allergies    No Known Allergies    Intolerances    REVIEW OF SYSTEMS  --------------------------------------------------------------------------------  Unable to obtain ROS due to current clinical status.     MEDICATIONS  (STANDING):  albumin human 25% IVPB 100 milliLiter(s) IV Intermittent every 6 hours  buDESOnide    Inhalation Suspension 0.5 milliGRAM(s) Inhalation every 12 hours  caspofungin IVPB 50 milliGRAM(s) IV Intermittent every 24 hours  caspofungin IVPB      chlorhexidine 0.12% Liquid 15 milliLiter(s) Oral Mucosa every 12 hours  chlorhexidine 2% Cloths 1 Application(s) Topical <User Schedule>  collagenase Ointment 1 Application(s) Topical daily  CRRT Treatment    <Continuous>  cycloSPORINE  , modified (GENGRAF) Solution 75 milliGRAM(s) Oral <User Schedule>  diphenoxylate/atropine 2 Tablet(s) Oral every 6 hours  ganciclovir IVPB 250 milliGRAM(s) IV Intermittent every 12 hours  imipenem/cilastatin  IVPB 500 milliGRAM(s) IV Intermittent every 6 hours  insulin lispro (ADMELOG) corrective regimen sliding scale   SubCutaneous every 6 hours  insulin NPH human recombinant 6 Unit(s) SubCutaneous every 6 hours  loperamide Liquid 4 milliGRAM(s) Enteral Tube every 6 hours  melatonin 5 milliGRAM(s) Oral at bedtime  methylPREDNISolone sodium succinate Injectable 32 milliGRAM(s) IV Push <User Schedule>  metoprolol tartrate 25 milliGRAM(s) Oral every 8 hours  metroNIDAZOLE  IVPB 500 milliGRAM(s) IV Intermittent every 8 hours  midodrine 20 milliGRAM(s) Oral every 8 hours  mupirocin 2% Ointment 1 Application(s) Topical every 12 hours  nystatin Powder 1 Application(s) Topical every 12 hours  pantoprazole  Injectable 40 milliGRAM(s) IV Push every 12 hours  Phoxillum Filtration BK 4 / 2.5 5000 milliLiter(s) (1200 mL/Hr) CRRT <Continuous>  Phoxillum Filtration BK 4 / 2.5 5000 milliLiter(s) (200 mL/Hr) CRRT <Continuous>  PrismaSATE Dialysate BGK 4 / 2.5 5000 milliLiter(s) (1500 mL/Hr) CRRT <Continuous>  QUEtiapine 25 milliGRAM(s) Oral at bedtime  trimethoprim  40 mG/sulfamethoxazole 200 mG Suspension 80 milliGRAM(s) Enteral Tube daily  ursodiol Suspension 300 milliGRAM(s) Oral every 12 hours    MEDICATIONS  (PRN):  albuterol/ipratropium for Nebulization 3 milliLiter(s) Nebulizer every 6 hours PRN Shortness of Breath and/or Wheezing  FIRST- Mouthwash  BLM 10 milliLiter(s) Swish and Spit every 8 hours PRN Mouth Care  oxyCODONE    Solution 5 milliGRAM(s) Oral every 4 hours PRN Severe Pain (7 - 10)  oxyCODONE    Solution 2.5 milliGRAM(s) Oral every 4 hours PRN Moderate Pain (4 - 6)      Vital Signs Last 24 Hrs  T(C): 36 (08 Jan 2025 07:00), Max: 37.2 (07 Jan 2025 19:00)  T(F): 96.8 (08 Jan 2025 07:00), Max: 99 (07 Jan 2025 19:00)  HR: 80 (08 Jan 2025 10:00) (80 - 99)  BP: 116/55 (08 Jan 2025 10:00) (82/54 - 143/65)  BP(mean): 78 (08 Jan 2025 10:00) (61 - 97)  RR: 25 (08 Jan 2025 10:00) (17 - 31)  SpO2: 100% (08 Jan 2025 10:00) (92% - 100%)    Parameters below as of 08 Jan 2025 07:00  Patient On (Oxygen Delivery Method): ventilator    O2 Concentration (%): 30    I&O's Summary    07 Jan 2025 07:01  -  08 Jan 2025 07:00  --------------------------------------------------------  IN: 4889.4 mL / OUT: 3953 mL / NET: 936.4 mL    08 Jan 2025 07:01  -  08 Jan 2025 11:44  --------------------------------------------------------  IN: 295 mL / OUT: 0 mL / NET: 295 mL      Physical Exam:  Gen: on vent via tracheostomy. opens eyes.  HEENT: Icterus present, +NGT  Abdomen: + ileostomy with liquid brown stool, VAC dressing present   MSK: +LE edema and UE edema.   Skin: Superficial skin ulceration b/l thighs/dark necrotic appearing scrotum.   Vascular: Right IJ temporary dialysis catheter     LABS/STUDIES  --------------------------------------------------------------------------------                               8.8    4.61  )-----------( 17       ( 08 Jan 2025 06:21 )             26.2     01-08    141  |  105  |  44[H]  ----------------------------<  218[H]  4.3   |  18[L]  |  0.41[L]    Ca    8.4      08 Jan 2025 06:21  Phos  4.3     01-08  Mg     2.4     01-08    TPro  4.4[L]  /  Alb  3.4  /  TBili  15.5[H]  /  DBili  x   /  AST  39  /  ALT  44  /  AlkPhos  72  01-08          Culture - Blood (collected 01-05-25 @ 05:00)  Source: .Blood BLOOD  Preliminary Report (01-08-25 @ 09:01):    No growth at 72 Hours    Culture - Blood (collected 01-05-25 @ 02:20)  Source: .Blood BLOOD  Preliminary Report (01-08-25 @ 09:01):    No growth at 72 Hours    Culture - Blood (collected 01-02-25 @ 00:30)  Source: .Blood BLOOD  Final Report (01-07-25 @ 05:01):    No growth at 5 days

## 2025-01-08 NOTE — PROGRESS NOTE ADULT - ASSESSMENT
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 Post op course complicated by acute hypoxic respiratory failure requiring intubation multiple times, most recently on 12/7 with conversion to tracheostomy on 12/17, e.coli bacteremia with RTOR on 12/7 for concern for worsening septic shock and cardiogenic shock s/p balloon pump placement and total abdominal colectomy in setting of ischemic bowel s/p OR on 12/9 for ileostomy creation, and olirugirc MORAIMA requiring CRRT and now intermittent HD.    Diagnosed with pneumonia secondary to Stenotrophomonas    Also with growth of Enterococcus faecalis from abdominal drains and urine culture    More fever and shock event on 12/29/24 likely 2/2 GIB    UA (12/19) 2 WBC  BCx (12/23) Clostridium paraputrificum  Bronch Cx (12/23) NGTD    CT Chest (12/23) Bibasilar groundglass and tree in bud opacities which may represent distal airway impaction versus pneumonia.    CT A/P (12/23) Peripheral wedge-shaped areas of hypoattenuation involving the superior aspect of the spleen, suggestive of splenic infarcts (12-59). Mildly dilated loops of proximal small bowel without transition point, likely ileus.    Testicular US (12/23) No appreciable arterial flow with very limited venous flow in in the testes bilaterally. Interval decrease in diffuse scrotal edema. Small bilateral hydroceles.    CT Pelvis (12/25) Moderate diffuse subcutaneous/scrotal edema without defined collection or subcutaneous air.    BCX (12/28): Gram variable rods (Blood PCR neg)    Antibiotic Course:  Meropenem ( 11/22-11/29, 11/22-11/29, 12/16-)   Minocycline (12/21-1/1)  Bactrim (11/16-11/24, 12/8-12/11, 12/21-)  Fluconazole (11/16-12/2, 12/12-12/16)   Caspofungin ( 12/6-12/11, 12/17-)  Cefepime (12/10-12/16)   Metronidazole (12/10-12/14)   Ganciclovir (12/7-12/13)   Linezolid (11/23-11/26, 12/6-12/11, 12/28-12/29)   Atovaquone (11/29-12/6)   Zosyn (11/20-11/22,12/6)   Tobramycin (12/6)   Valganciclovir (11/6-12/4)    #Positive Blood Culture (12/23 Clostridium paraputrificum) (12/28: Gram variable rods -Blood PCR neg)  Clostridium paraputrificum is generally penicillin and metronidazole susceptible      #Leukocytosis, Fever, Shock, Transaminitis,   #Stenotrophomonas tracheo/Pneumonia? s/p minocycline course  # polymicrobial bacteremia E faecalis; Clostridium spp in c/o Gi pathology but also necrotic scrotum     --If further worsening shock overnight would repeat blood cultures and administer Tobramycin 600 mg IV x1 (7 mg/kg based on adjusted body weight).   --CT C/A/P showing Small bilateral pleural effusions with associated compressive atelectasis, increased since prior exam. Mild distention and mural thickening of small bowel of concern for enteritis. Status post liver transplant and colectomy with stable postsurgical changes.  --s/p 10 day course of Minocycline 200 mg IV Q12H (12/21 >1/1) for Stenorophomnas   --Continue flagyl 500mg Q8, and  Imipenem 500mg Q6- anticipate total of 14 days (day 11)  --remains on Caspofungin;   -Decrease immunosuppression if possible   BiT today - continuing plasmapheresis    #Liver Transplant Recipient, Prophylactic Antibiotic  --Repeat CMV detectable on 12/31  --Given thrombocytopenia can consider holding Bactrim or now  Cytomegalovirus By PCR: Det <34.5 IU/mL (12.23.24 @ 06:15)  Cytomegalovirus By PCR: 537 IU/mL (12.31.24 @ 13:36)  --restarted  ganciclovir IV adjusted for CVVH dosing- but at 2.5 mg/kg daily- now CMV PCR 1200--> would increase ganciclovir to 2.5 mg/kg q12h  check CMV PCR next monday      Thank you for involving us in the care of this patient  Transplant ID will continue to follow  Please call or page with additional questions  Pager; #[phone]  Teams: from 8 am to 5 pm  Rachele Lewis MD

## 2025-01-08 NOTE — PROGRESS NOTE ADULT - ASSESSMENT
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 Post-op course c/b worsening mental status and acute hypoxic respiratory failure requiring multiple intubations/extubations, currently extubated (as of 11/26/24) and colonic ileus s/p several rounds of Relistor and Neostigmine with NGT and rectal tube currently in place now with septic shock due to ischemic bowel s/p total colectomy with ostomy creation. Pt. now septic with blood culture positive for clostridium paraputrificum and s/p PLEX 3 session for hyperbilirubinemia.  Transplant nephrology consulted for metabolic acidosis and MORAIMA.    1. s/p OLT 11/15/24 with oliguric MORAIMA in setting of septic shock.  Likely hemodynamically mediated in setting of cardiogenic and septic shock on multiple vasopressors and prior IABP.   - CT AP non-con: Bilateral renal cysts including cysts with peripheral calcification in the left kidney.  - Initiated on CRRT 12/7/24- 12/15/24 at 1AM stopped. s/p IHD 12/18/24 however required levophed.   - Has been back on CRRT but stopped last night due to catheter malfunction.  Plan to replace catheter and resume CRRT.  If BP remains ok off of pressers can consider iHD by end of this week or early next week.

## 2025-01-08 NOTE — PROVIDER CONTACT NOTE (OTHER) - SITUATION
access issues on blue lumen on left IJ Babatunde
patient c/o anxiety and agitation- SICU Team aware and 0.5mg ativan given as per order
pt has c/o of CP with 5 lead ECG showing ST depression on lead V1
low blood pressor systolic 84 and 87
Pt monitor alarming for vtach up to 130s. Self resolved after 3 minutes back into normal sinus rhythm 90s.
JAZZ Wright came to bedside at 0400 to administer cathflo for shiley into blue lumen, the cathflo was removed at 0430 by provider Kyle
Patient is Tachycardic to the 130s, febrile 38.1 and is shaking from being cold.
pt Vicky steadied to chair when placed in chair pt has c/o of chest pain with ST changes.

## 2025-01-08 NOTE — PROCEDURE NOTE - NSCVLACTUALSITE_VASC_A_CORE
left/internal jugular
right/internal jugular
internal jugular
right/internal jugular
left/internal jugular
right

## 2025-01-08 NOTE — PROVIDER CONTACT NOTE (OTHER) - NAME OF MD/NP/PA/DO NOTIFIED:
JAZZ Galindo
JAZZ Wright
Roxana SCHULZ
JAZZ Galindo and JAZZ Wright
Raissa Carranza
JAZZ Wright
MD Rosenberg
JAZZ Taveras and JAZZ Wright

## 2025-01-09 NOTE — PROGRESS NOTE ADULT - ASSESSMENT
74 male w/ a PMHx of HTN, DM, AF, BPH, MASH cirrhosis and HCC s/p OLT on 11/15. Case was uneventful but post-op course has been prolonged and c/b delirium, aspiration, multiple episodes of acute hypoxic respiratory failure requiring reintubation from 11/20-11/24 & 11/24-11/26, AF w/ RVR, ileus requiring NGT, distended colon requiring rectal tube, dysphagia, malnutrition, hypernatremia, fevers, pancytopenia, poorly controlled glucoses, and left brachial VTE. Patient went into severe refractory septic shock on 12/6 w/ blood cultures positive for E. coli s/p s/p ex lap, total abd colectomy, end ileostomy, 3 intentionally retained laparotomy pads for bleeding, Abthera, IABP placement w/ admission to CTICU, Patient required inotropes, Jacqui, and CRRT post-op. s/p closure 12/9. IABP removed 12/10 and transferred back to SICU 12/13. SICU course c/b narrow complex arrythmia w/ ECG showing new ST elevations concerning for posterior wall MI vs vasospasms, cards consulted and started on heparin gtt for empiric ACS tx which was c/b GIB then transitioned to argatroban c/b bleed. TTE revealed LVOT obstruction & TODD    Wound Consult requested to assist w/ management of  Sacral/ buttocks unstageable pressure injury   Moisture Associated Dermatitis scrotum/ Buttocks/ Thighs  Scrotal Edema  Ascites       Buttocks/ Sacrum/ Scrotum / thighs  TRIAD BID and prn soiling    Scrotal elevation continue  Bilateral groin - interDry Ag QD  surgical wound management as per surgical team    Continue w/ attends under pads and Pericare w/ Castro & Ostomy care as per protocol  Continue turning and positioning w/ offloading assistive devices as per protocol  consider NOEMI/PVR's if in line with GOC   Waffle Cushion to chair when oob to chair  Continue w/ alternating air with low air loss surface pressure redistribution bed surface   Moisturize intact skin w/ SWEEN cream BID  Appreciate nutrition consult - pt w/ Increased protein-energy needs and Acute Severe protein calorie malnutrition       multivitamin, vitamin C to assist w/ wound healing       diet pending as pt post op- Consider CAMI to promote wound healing when post op care allows  Abx as per SiCU / ID  Care as per SICU, will follow w/ you  Upon discharge f/u as outpatient at [facility] [address] [facility] [phone]  Seen w/ RN and  D/w attng, team   Thank you for this consult  Nights/ Weekends/ Holidays please call:  General Surgery Consult pager ([phone]) for emergencies  Wound PT for multilayer leg wrapping or VAC issues ([phone])  I spent 50minutes face to face w/ this pt of which more than 50% of the time was spent counseling & coordinating care of this pt.

## 2025-01-09 NOTE — CHART NOTE - NSCHARTNOTEFT_GEN_A_CORE
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 Post-op course c/b worsening mental status and acute hypoxic respiratory failure requiring multiple intubations/extubations, most recently on 12/7 with conversion to tracheostomy on 12/17, e.coli bacteremia with RTOR on 12/7 for concern for worsening septic shock and cardiogenic shock s/p balloon pump placement and total abdominal colectomy in setting of ischemic bowel s/p OR on 12/9 for ileostomy creation, and oligouric MORAIMA requiring CRRT and now intermittent HD. Patient previously had plasma exchange from 01/03/25 to 01/05/25, patient tolerated the procedure well. In view of rising billirubin (20.1 as of 01/07/25) transplant team contacted transfusion medicine to have additional TPE procedures. A course of 3 TPE over 5 days is planned, starting 01/07/25.    - PLEX#1: Performed on 01/07/25. 1 Plasma volume of the patient replaced with 100% donor plasma. Patient tolerated procedure well.    - PLEX#2: Performed on 01/09/25. 1 Plasma volume of the patient replaced with 100% donor plasma. Patient tolerated procedure well.    - PLEX#3: Scheduled for 01/11/25. 1 Plasma volume of the patient to be replaced with 100% donor plasma.

## 2025-01-09 NOTE — PROGRESS NOTE ADULT - ASSESSMENT
ASSESSMENT: 74 male w/ a PMHx of HTN, DM, AF, BPH, MASH cirrhosis and HCC s/p OLT on 11/15. Case was uneventful but post-op course has been prolonged and c/b delirium, aspiration, multiple episodes of acute hypoxic respiratory failure requiring reintubation from 11/20-11/24 & 11/24-11/26, AF w/ RVR, ileus requiring NGT, distended colon requiring rectal tube, dysphagia, malnutrition, hypernatremia, fevers, pancytopenia, poorly controlled glucoses, and left brachial VTE. Patient went into severe refractory septic shock on 12/6 w/ blood cultures positive for E. coli s/p s/p ex lap, total abd colectomy, end ileostomy, 3 intentionally retained laparotomy pads for bleeding, Abthera, IABP placement w/ admission to CTICU, Patient required inotropes, Jacqui, and CRRT post-op. s/p closure 12/9. IABP removed 12/10 and transferred back to SICU 12/13. SICU course c/b narrow complex arrythmia w/ ECG showing new ST elevations concerning for posterior wall MI vs vasospasms, cards consulted and started on heparin gtt for empiric ACS tx which was c/b GIB then transitioned to argatroban c/b bleed. TTE revealed LVOT obstruction & TODD.     PLAN:    Neuro:  - Pain: PRN oxy solution  - Opens eyes intermittently, intermittently following commands, off sedation  - CT head 11/19, 11/21, 11/25, 11/29, 12/5 negative  - EEG: Mild diffuse cerebral dysfunction that is not specific in etiology. No epileptic discharges recorded. No seizures recorded.  - Holding home xanax, Abilify, Zoloft, Remeron, Lexapro  - melatonin added  - CT head 12/20 showing age-indeterminate infarct, f/u Neurology recs  - No need for MRI     Resp:  - S/p bedside tracheostomy 12/17  - PRVC 14/470/6/30  - trach collar daily, full support overnight  - c/w inhaled bronchodilator  - c/w suctioning PRN    CV:  - off pressors  - 20mg midodrine q8  - home metoprolol 25 q8  - IABP removed 12/10  - TTE 12/6 w/ LVOT obstruction & TODD  - TTE 12/11 shows EF of 55-60%    GI:  - trend LFTs  - NPO, NGT w/ TFs  - lomotil, imodium, opium tincture for high ostomy output   - protonix BID  - monitor ALYSSA output    /Renal:  - CRRT net even  - Monitor BUN/Cr, I&Os, trend UOP  - Monitor scrotal necrosis, maintain nichols at all times  - Bladder scan q12    Heme:  - trend H/H, PLTs, coags  - hep gtt and argatroban gtt held i/s/o bleed  - vitK 5 PO for 3 days (1/3-1/5)  - PLEX started 1/3, last 1/5    ID:  - ABX: caspo, flagyl, imepenem, flagyl  - transplant ppx w/ bactrim  - Ganciclovir for CMV   - immunosuppression w/ cyclosporine  - 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

## 2025-01-09 NOTE — PROGRESS NOTE ADULT - REASON FOR ADMISSION
Both echoes were done in [facility] in [place]. The second echo showed no LV apex no thrombus identified. Ok to cancel the echo? Liver Transplant

## 2025-01-09 NOTE — PROGRESS NOTE ADULT - SUBJECTIVE AND OBJECTIVE BOX
[facility] DIVISION OF KIDNEY DISEASES AND HYPERTENSION -- FOLLOW UP NOTE  --------------------------------------------------------------------------------  Chief Complaint: Oliguric MORAIMA in OLT transplant       24 hour events/subjective: Pt. seen and examined at bedside earlier today in SICU. Pt. restarted on CRRT at 8PM yesterday evening after catheter placement. Pt. remains on trach, not following commands.      PAST HISTORY  --------------------------------------------------------------------------------  No significant changes to PMH, PSH, FHx, SHx, unless otherwise noted    ALLERGIES & MEDICATIONS  --------------------------------------------------------------------------------  Allergies    No Known Allergies    Intolerances      Standing Inpatient Medications  albumin human 25% IVPB 100 milliLiter(s) IV Intermittent every 6 hours  buDESOnide    Inhalation Suspension 0.5 milliGRAM(s) Inhalation every 12 hours  caspofungin IVPB      caspofungin IVPB 50 milliGRAM(s) IV Intermittent every 24 hours  chlorhexidine 0.12% Liquid 15 milliLiter(s) Oral Mucosa every 12 hours  chlorhexidine 2% Cloths 1 Application(s) Topical <User Schedule>  collagenase Ointment 1 Application(s) Topical daily  CRRT Treatment    <Continuous>  cycloSPORINE  , modified (GENGRAF) Solution 75 milliGRAM(s) Oral <User Schedule>  diphenoxylate/atropine 2 Tablet(s) Oral every 6 hours  ganciclovir IVPB 250 milliGRAM(s) IV Intermittent every 12 hours  imipenem/cilastatin  IVPB 500 milliGRAM(s) IV Intermittent every 6 hours  insulin lispro (ADMELOG) corrective regimen sliding scale   SubCutaneous every 6 hours  insulin NPH human recombinant 8 Unit(s) SubCutaneous every 6 hours  loperamide Liquid 4 milliGRAM(s) Enteral Tube every 6 hours  melatonin 5 milliGRAM(s) Oral at bedtime  methylPREDNISolone sodium succinate Injectable 32 milliGRAM(s) IV Push <User Schedule>  metoprolol tartrate 25 milliGRAM(s) Oral every 8 hours  metroNIDAZOLE  IVPB 500 milliGRAM(s) IV Intermittent every 8 hours  midodrine 20 milliGRAM(s) Oral every 8 hours  mupirocin 2% Ointment 1 Application(s) Topical every 12 hours  nystatin Powder 1 Application(s) Topical every 12 hours  opium Tincture 2 milliGRAM(s) Oral four times a day  pantoprazole  Injectable 40 milliGRAM(s) IV Push every 12 hours  Phoxillum Filtration BK 4 / 2.5 5000 milliLiter(s) CRRT <Continuous>  PrismaSATE Dialysate BGK 4 / 2.5 5000 milliLiter(s) CRRT <Continuous>  PrismaSOL Filtration BGK 0 / 2.5 5000 milliLiter(s) CRRT <Continuous>  QUEtiapine 50 milliGRAM(s) Oral at bedtime  trimethoprim  40 mG/sulfamethoxazole 200 mG Suspension 80 milliGRAM(s) Enteral Tube daily  ursodiol Suspension 300 milliGRAM(s) Oral every 12 hours    PRN Inpatient Medications  albuterol/ipratropium for Nebulization 3 milliLiter(s) Nebulizer every 6 hours PRN  FIRST- Mouthwash  BLM 10 milliLiter(s) Swish and Spit every 8 hours PRN  oxyCODONE    Solution 5 milliGRAM(s) Oral every 4 hours PRN  oxyCODONE    Solution 2.5 milliGRAM(s) Oral every 4 hours PRN      REVIEW OF SYSTEMS  --------------------------------------------------------------------------------  Unable to obtain ROS due to current clinical status.     VITALS/PHYSICAL EXAM  --------------------------------------------------------------------------------  T(C): 37 (01-09-25 @ 11:00), Max: 37 (01-09-25 @ 11:00)  HR: 91 (01-09-25 @ 13:00) (75 - 91)  BP: 138/61 (01-09-25 @ 13:00) (103/55 - 143/65)  RR: 28 (01-09-25 @ 13:00) (17 - 29)  SpO2: 100% (01-09-25 @ 13:00) (95% - 100%)  Wt(kg): --        01-08-25 @ 07:01  -  01-09-25 @ 07:00  --------------------------------------------------------  IN: 4665 mL / OUT: 2255 mL / NET: 2410 mL    01-09-25 @ 07:01  -  01-09-25 @ 14:07  --------------------------------------------------------  IN: 690 mL / OUT: 407 mL / NET: 283 mL        Physical Exam:  Gen: on vent via tracheostomy. opens eyes.  HEENT: Icterus present, +NGT  Abdomen: + ileostomy with liquid brown stool, VAC dressing present   MSK: +LE edema and UE edema.   Skin: Superficial skin ulceration b/l thighs/dark necrotic appearing scrotum.   Vascular: Right IJ temporary dialysis catheter     LABS/STUDIES  --------------------------------------------------------------------------------              8.3    2.77  >-----------<  25       [01-09-25 @ 06:00]              24.5     140  |  105  |  40  ----------------------------<  149      [01-09-25 @ 06:00]  4.2   |  20  |  <0.30        Ca     8.5     [01-09-25 @ 06:00]      Mg     2.4     [01-09-25 @ 06:00]      Phos  3.1     [01-09-25 @ 06:00]    TPro  4.7  /  Alb  3.7  /  TBili  18.1  /  DBili  x   /  AST  36  /  ALT  30  /  AlkPhos  79  [01-09-25 @ 06:00]    PT/INR: PT 32.6 , INR 2.89       [01-09-25 @ 06:00]  PTT: 57.7       [01-09-25 @ 06:00]      Creatinine Trend:  SCr <0.30 [01-09 @ 06:00]  SCr 0.32 [01-08 @ 23:55]  SCr 0.49 [01-08 @ 18:27]  SCr 0.44 [01-08 @ 13:25]  SCr 0.41 [01-08 @ 06:21]      Urinalysis - [01-09-25 @ 06:00]      Color  / Appearance  / SG  / pH       Gluc 149 / Ketone   / Bili  / Urobili        Blood  / Protein  / Leuk Est  / Nitrite       RBC  / WBC  / Hyaline  / Gran  / Sq Epi  / Non Sq Epi  / Bacteria       Vitamin D (25OH) <6.0      [11-21-24 @ 08:32]  TSH 0.68      [12-12-24 @ 12:27]  Lipid: chol 114, , HDL 47, LDL --      [04-24-24 @ 06:48]          IMAGING/RADIOLOGY: Reviewed.

## 2025-01-09 NOTE — PROGRESS NOTE ADULT - SUBJECTIVE AND OBJECTIVE BOX
[facility]-- WOUND TEAM -- FOLLOW UP NOTE  --------------------------------------------------------------------------------    24 hour events/subjective:    afebrile  trach collar/ remains in ICU  tolerating tf  CVVHD  (+)nichols  family at bedside, all questions answered to their expressed understanding      Diet:  Diet, NPO with Tube Feed:   Tube Feeding Modality: Nasogastric  Vital 1.5 [place] (VITAL1.5RTH)  Total Volume for 24 Hours (mL): 1560  Continuous  Starting Tube Feed Rate mL per Hour: 65     Every 4 hours  Until Goal Tube Feed Rate (mL per Hour): 65  Tube Feed Duration (in Hours): 24  Tube Feed Start Time: 10:30  Alfredo(7 Gm Arginine/7 Gm Glut/1.2 Gm HMB     Qty per Day:  2  Banatrol TF     Qty per Day:  3 (01-03-25 @ 10:23)      ROS: pt unable to offer    ALLERGIES & MEDICATIONS  --------------------------------------------------------------------------------      No Known Allergies      STANDING INPATIENT MEDICATIONS  albumin human 25% IVPB 100 milliLiter(s) IV Intermittent every 6 hours  buDESOnide  Inhalation Suspension 0.5 milliGRAM(s) Inhalation every 12 hours  caspofungin IVPB 50 milliGRAM(s) IV Intermittent every 24 hours      chlorhexidine 0.12% Liquid 15 milliLiter(s) Oral Mucosa every 12 hours  chlorhexidine 2% Cloths 1 Application(s) Topical <User Schedule>  collagenase Ointment 1 Application(s) Topical daily  CRRT Treatment    <Continuous>  cycloSPORINE  , modified (GENGRAF) Solution 75 milliGRAM(s) Oral <User Schedule>  diphenoxylate/atropine 2 Tablet(s) Oral every 6 hours  ganciclovir IVPB 250 milliGRAM(s) IV Intermittent every 12 hours  imipenem/cilastatin  IVPB 500 milliGRAM(s) IV Intermittent every 6 hours  insulin lispro (ADMELOG) corrective regimen sliding scale   SubCutaneous every 6 hours  insulin NPH human recombinant 8 Unit(s) SubCutaneous every 6 hours  loperamide Liquid 4 milliGRAM(s) Enteral Tube every 6 hours  melatonin 5 milliGRAM(s) Oral at bedtime  methylPREDNISolone sodium succinate Injectable 32 milliGRAM(s) IV Push <User Schedule>  metoprolol tartrate 25 milliGRAM(s) Oral every 8 hours  metroNIDAZOLE  IVPB 500 milliGRAM(s) IV Intermittent every 8 hours  midodrine 20 milliGRAM(s) Oral every 8 hours  mupirocin 2% Ointment 1 Application(s) Topical every 12 hours  nystatin Powder 1 Application(s) Topical every 12 hours  opium Tincture 2 milliGRAM(s) Oral four times a day  pantoprazole  Injectable 40 milliGRAM(s) IV Push every 12 hours  Phoxillum Filtration BK 4 / 2.5 5000 milliLiter(s) CRRT <Continuous>  PrismaSATE Dialysate BGK 4 / 2.5 5000 milliLiter(s) CRRT <Continuous>  PrismaSOL Filtration BGK 0 / 2.5 5000 milliLiter(s) CRRT <Continuous>  QUEtiapine 50 milliGRAM(s) Oral at bedtime  trimethoprim  40 mG/sulfamethoxazole 200 mG Suspension 80 milliGRAM(s) Enteral Tube daily  ursodiol Suspension 300 milliGRAM(s) Oral every 12 hours      PRN INPATIENT MEDICATION  albuterol/ipratropium for Nebulization 3 milliLiter(s) Nebulizer every 6 hours PRN  FIRST- Mouthwash  BLM 10 milliLiter(s) Swish and Spit every 8 hours PRN  oxyCODONE    Solution 5 milliGRAM(s) Oral every 4 hours PRN  oxyCODONE    Solution 2.5 milliGRAM(s) Oral every 4 hours PRN        VITALS/PHYSICAL EXAM  --------------------------------------------------------------------------------  T(C): 36.7 (01-09-25 @ 15:00), Max: 37 (01-09-25 @ 11:00)  HR: 85 (01-09-25 @ 15:00) (75 - 91)  BP: 120/58 (01-09-25 @ 15:00) (103/55 - 143/65)  RR: 23 (01-09-25 @ 15:00) (17 - 29)  SpO2: 93% (01-09-25 @ 15:00) (93% - 100%)  Wt(kg): --      Guarded but stable, arousable, WN/ WG/ WD, pressors  Progressa bed  HEENT: NC/AT, jaundice, mucosa moist, throat clear, trach collar  Respiratory: Nonlabored w/ equal chest rise  Gastrointestinal: soft NT/ND (+)stoma moist, viable, functioning      VAC in place w/ good seal care as per surgery  : (+) nichols, scrotal edema much improved     Scrotum grey dusky skin w/o drainage or blistering   no blistering, odor, tenderness, increased warmth, erythema, crepitus, induration, or fluctuance  Neurology: sedated  Psych: difficult to assess  Musculoskeletal:  pROM, no deformities/ contractures  Vascular: BLE edema equal, no palpable DP pulses  Skin: moist w/ good turgor, anasarca   Bilateral thighs w/ areas of grey slough and pink denuded skin         w/  clear yellow serous weeping    Sacrum/bilateral buttocks unstageable pressure injury    dry adherent black eschar w/ denuded and hyperpigmented skin in periwound    10cm x 8.0cm x 0.1cm,     no drainage or blistering  no blistering, odor, tenderness, increased warmth, erythema, crepitus, induration, or fluctuance          LABS/ CULTURES/ RADIOLOGY:              8.3    2.77  >-----------<  25       [01-09-25 @ 06:00]              24.5     139  |  103  |  42  ----------------------------<  208      [01-09-25 @ 13:38]  4.3   |  18  |  0.40        Ca     9.3     [01-09-25 @ 13:38]      Mg     2.4     [01-09-25 @ 13:38]      Phos  4.0     [01-09-25 @ 13:38]    TPro  5.0  /  Alb  3.5  /  TBili  9.1  /  DBili  x   /  AST  26  /  ALT  18  /  AlkPhos  62  [01-09-25 @ 13:38]    PT/INR: PT 32.6 , INR 2.89       [01-09-25 @ 06:00]  PTT: 57.7       [01-09-25 @ 06:00]      CAPILLARY BLOOD GLUCOSE  POCT Blood Glucose.: 215 mg/dL (09 Jan 2025 12:48)  POCT Blood Glucose.: 149 mg/dL (09 Jan 2025 05:44)  POCT Blood Glucose.: 160 mg/dL (08 Jan 2025 23:47)  POCT Blood Glucose.: 230 mg/dL (08 Jan 2025 18:19)        Culture - Blood (collected 01-05-25 @ 05:00)  Source: .Blood BLOOD  Preliminary Report (01-09-25 @ 09:00):    No growth at 4 days    Culture - Blood (collected 01-05-25 @ 02:20)  Source: .Blood BLOOD  Preliminary Report (01-09-25 @ 09:00):    No growth at 4 days

## 2025-01-09 NOTE — PROGRESS NOTE ADULT - SUBJECTIVE AND OBJECTIVE BOX
Transplant Surgery - Multidisciplinary Rounds  --------------------------------------------------------------  OLT   11/15/2024         Colectomy 12/7/2024     IABP 12/7 removed 12/10  Tracheostomy 12/17/2024    Present:   Patient seen and examined with multidisciplinary Transplant team including Surgeon: Dr. Dagher, Hepatologist Dr. Luis, JAZZ Velasquez/Jasmine and bedside RN in AM rounds.   Disciplines not in attendance will be notified of the plan.     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   s/p OLT 11/15 with post op course c/b:  ·	Hypoxia: Reintubated 11/20, extubated 11/24->reintubated 11/24, extubated 11/26, reintubated 12/7, trached 12/17  ·	Ileus   ·	A fib  ·	AMS/Seizure   ·	L brachial DVT  ·	Neutropenia  ·	E coli Bacteremia (12/6)  ·	s/p Colectomy 12/7.   ·	IABP 12/7, removed 12/10  ·	Ec Faecalis UTI (12/16)  ·	Stenotrophamonas in trach aspirate (12/19)  ·	Clostridium in blood 12/23  ·	UGIB 12/26  ·	CMV Viremia  ·	MORAIMA requiring CRRT  ·	Shock liver    Interval/Overnight Events:   - afebrile on abx, VSS off kassandra  - IVIG#1  - Blood products PRN  - CRRT net even  - high ileostomy output, started opium tincture  - seroquel    Immunosuppression:  - Cyclo per level, MMF HELD, Solu 32  -ongoing monitoring for signs of rejection      TX DATA HERE      ROS: Unable to assess patient is lethargic, s/p tracheostomy    PHYSICAL EXAM:   Constitutional: trach to vent, awake, unresponsive  Eyes:  PERRLA, Scleral icterus  ENMT: nc/at, no thrush, NGT  Neck: supple, trach   Respiratory: CTA B/L  Cardiovascular: RRR  Gastrointestinal: incision clean/dry/intact + Ostomy pink output stool, wound vac, peritoneal drains x2 bilious   Genitourinary: +scrotal edema w/ large fluid collection; black/green discoloration  Extremities: SCD's in place and working bilaterally, + LE edema  Neurological: trach to vent, opens eyes to voice   Skin: large sacral decub, poor healing with breakdown Transplant Surgery - Multidisciplinary Rounds  --------------------------------------------------------------  OLT   11/15/2024         Colectomy 12/7/2024     IABP 12/7 removed 12/10  Tracheostomy 12/17/2024    Present:   Patient seen and examined with multidisciplinary Transplant team including Surgeon: Dr. Dagher, Hepatologist Dr. Luis, JAZZ Velasquez and bedside RN in AM rounds.   Disciplines not in attendance will be notified of the plan.     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   s/p OLT 11/15 with post op course c/b:  ·	Hypoxia: Reintubated 11/20, extubated 11/24->reintubated 11/24, extubated 11/26, reintubated 12/7, trached 12/17  ·	Ileus   ·	A fib  ·	AMS/Seizure   ·	L brachial DVT  ·	Neutropenia  ·	E coli Bacteremia (12/6)  ·	s/p Colectomy 12/7.   ·	IABP 12/7, removed 12/10  ·	Ec Faecalis UTI (12/16)  ·	Stenotrophamonas in trach aspirate (12/19)  ·	Clostridium in blood 12/23  ·	UGIB 12/26  ·	CMV Viremia  ·	MORAIMA requiring CRRT  ·	Shock liver    Interval/Overnight Events:   - afebrile on abx, VSS off kassandra  - IVIG#1  - Blood products PRN  - CRRT net even  - high ileostomy output, started opium tincture  - seroquel    Immunosuppression:  - Cyclo per level, MMF HELD, Solu 32  -ongoing monitoring for signs of rejection        MEDICATIONS  (STANDING):  albumin human 25% IVPB 100 milliLiter(s) IV Intermittent every 6 hours  buDESOnide    Inhalation Suspension 0.5 milliGRAM(s) Inhalation every 12 hours  caspofungin IVPB 50 milliGRAM(s) IV Intermittent every 24 hours  caspofungin IVPB      chlorhexidine 0.12% Liquid 15 milliLiter(s) Oral Mucosa every 12 hours  chlorhexidine 2% Cloths 1 Application(s) Topical <User Schedule>  collagenase Ointment 1 Application(s) Topical daily  CRRT Treatment    <Continuous>  cycloSPORINE  , modified (GENGRAF) Solution 75 milliGRAM(s) Oral <User Schedule>  diphenoxylate/atropine 2 Tablet(s) Oral every 6 hours  ganciclovir IVPB 250 milliGRAM(s) IV Intermittent every 12 hours  imipenem/cilastatin  IVPB 500 milliGRAM(s) IV Intermittent every 6 hours  insulin lispro (ADMELOG) corrective regimen sliding scale   SubCutaneous every 6 hours  insulin NPH human recombinant 8 Unit(s) SubCutaneous every 6 hours  loperamide Liquid 4 milliGRAM(s) Enteral Tube every 6 hours  melatonin 5 milliGRAM(s) Oral at bedtime  methylPREDNISolone sodium succinate Injectable 32 milliGRAM(s) IV Push <User Schedule>  metoprolol tartrate 25 milliGRAM(s) Oral every 8 hours  metroNIDAZOLE  IVPB 500 milliGRAM(s) IV Intermittent every 8 hours  midodrine 20 milliGRAM(s) Oral every 8 hours  mupirocin 2% Ointment 1 Application(s) Topical every 12 hours  nystatin Powder 1 Application(s) Topical every 12 hours  opium Tincture 2 milliGRAM(s) Oral four times a day  pantoprazole  Injectable 40 milliGRAM(s) IV Push every 12 hours  Phoxillum Filtration BK 4 / 2.5 5000 milliLiter(s) (1200 mL/Hr) CRRT <Continuous>  PrismaSATE Dialysate BGK 4 / 2.5 5000 milliLiter(s) (1500 mL/Hr) CRRT <Continuous>  PrismaSOL Filtration BGK 0 / 2.5 5000 milliLiter(s) (200 mL/Hr) CRRT <Continuous>  QUEtiapine 50 milliGRAM(s) Oral at bedtime  trimethoprim  40 mG/sulfamethoxazole 200 mG Suspension 80 milliGRAM(s) Enteral Tube daily  ursodiol Suspension 300 milliGRAM(s) Oral every 12 hours    MEDICATIONS  (PRN):  albuterol/ipratropium for Nebulization 3 milliLiter(s) Nebulizer every 6 hours PRN Shortness of Breath and/or Wheezing  FIRST- Mouthwash  BLM 10 milliLiter(s) Swish and Spit every 8 hours PRN Mouth Care  oxyCODONE    Solution 5 milliGRAM(s) Oral every 4 hours PRN Severe Pain (7 - 10)  oxyCODONE    Solution 2.5 milliGRAM(s) Oral every 4 hours PRN Moderate Pain (4 - 6)      PAST MEDICAL & SURGICAL HISTORY:  Diabetes      Transaminitis      Paroxysmal atrial fibrillation      Depression      BPH (benign prostatic hyperplasia)      Hypertension      Chronic atrial fibrillation      Coronary artery disease      Hepatocellular carcinoma      DM (diabetes mellitus)      HTN (hypertension)      Paroxysmal atrial fibrillation      Cirrhosis      HCC (hepatocellular carcinoma)      History of BPH      History of laparoscopic cholecystectomy      History of lumbar laminectomy      H/O prior ablation treatment      H/O percutaneous left heart catheterization          Vital Signs Last 24 Hrs  T(C): 37 (09 Jan 2025 11:00), Max: 37 (09 Jan 2025 11:00)  T(F): 98.6 (09 Jan 2025 11:00), Max: 98.6 (09 Jan 2025 11:00)  HR: 91 (09 Jan 2025 14:00) (75 - 91)  BP: 110/54 (09 Jan 2025 14:00) (103/55 - 143/65)  BP(mean): 77 (09 Jan 2025 14:00) (75 - 94)  RR: 29 (09 Jan 2025 14:00) (17 - 29)  SpO2: 93% (09 Jan 2025 14:00) (93% - 100%)    Parameters below as of 09 Jan 2025 11:00  Patient On (Oxygen Delivery Method): tracheostomy collar  O2 Flow (L/min): 30      I&O's Summary    08 Jan 2025 07:01  -  09 Jan 2025 07:00  --------------------------------------------------------  IN: 4665 mL / OUT: 2255 mL / NET: 2410 mL    09 Jan 2025 07:01  -  09 Jan 2025 14:47  --------------------------------------------------------  IN: 755 mL / OUT: 407 mL / NET: 348 mL                              8.3    2.77  )-----------( 25       ( 09 Jan 2025 06:00 )             24.5     01-09    139  |  103  |  42[H]  ----------------------------<  208[H]  4.3   |  18[L]  |  0.40[L]    Ca    9.3      09 Jan 2025 13:38  Phos  4.0     01-09  Mg     2.4     01-09    TPro  5.0[L]  /  Alb  3.5  /  TBili  9.1[H]  /  DBili  x   /  AST  26  /  ALT  18  /  AlkPhos  62  01-09          Culture - Blood (collected 01-05-25 @ 05:00)  Source: .Blood BLOOD  Preliminary Report (01-09-25 @ 09:00):    No growth at 4 days    Culture - Blood (collected 01-05-25 @ 02:20)  Source: .Blood BLOOD  Preliminary Report (01-09-25 @ 09:00):    No growth at 4 days                  ROS: Unable to assess patient is lethargic, s/p tracheostomy    PHYSICAL EXAM:   Constitutional: trach to vent, awake, unresponsive  Eyes:  PERRLA, Scleral icterus  ENMT: nc/at, no thrush, NGT  Neck: supple, trach   Respiratory: CTA B/L  Cardiovascular: RRR  Gastrointestinal: incision clean/dry/intact + Ostomy pink output stool, wound vac, peritoneal drains x2 bilious   Genitourinary: +scrotal edema w/ large fluid collection; black/green discoloration  Extremities: SCD's in place and working bilaterally, + LE edema  Neurological: trach to vent, opens eyes to voice   Skin: large sacral decub, poor healing with breakdown

## 2025-01-09 NOTE — PROGRESS NOTE ADULT - SUBJECTIVE AND OBJECTIVE BOX
24 HOUR EVENTS:  - trach collar today, goal 4h  - added opium tinctum for high ileostomy output   - Night time seroquel   - 1 plt 1 cyro   - IVIG  - Remove L IJ shiley; Trialysis catheter exchange over a wire on R IJ       NEURO  Exam: follows commands  Meds: melatonin 5 milliGRAM(s) Oral at bedtime  opium Tincture 2 milliGRAM(s) Oral four times a day  oxyCODONE    Solution 5 milliGRAM(s) Oral every 4 hours PRN Severe Pain (7 - 10)  oxyCODONE    Solution 2.5 milliGRAM(s) Oral every 4 hours PRN Moderate Pain (4 - 6)  QUEtiapine 25 milliGRAM(s) Oral at bedtime      RESPIRATORY  RR: 24 (01-09-25 @ 00:00) (19 - 29)  SpO2: 100% (01-09-25 @ 00:00) (95% - 100%)  Exam: trach collar during day, full support ON  Mechanical Ventilation: Mode: AC/ CMV (Assist Control/ Continuous Mandatory Ventilation), RR (machine): 14, RR (patient): 23, TV (machine): 470, FiO2: 30, PEEP: 5, ITime: 0.9, MAP: 9, PIP: 17    Meds: albuterol/ipratropium for Nebulization 3 milliLiter(s) Nebulizer every 6 hours PRN Shortness of Breath and/or Wheezing  buDESOnide    Inhalation Suspension 0.5 milliGRAM(s) Inhalation every 12 hours      CARDIOVASCULAR  HR: 80 (01-09-25 @ 00:00) (75 - 88)  BP: 124/60 (01-09-25 @ 00:00) (102/57 - 136/63)  BP(mean): 87 (01-09-25 @ 00:00) (76 - 91)  VBG - ( 08 Jan 2025 23:48 )  pH: 7.32  /  pCO2: 46    /  pO2: 42    / HCO3: 24    / Base Excess: -2.4  /  SaO2: 72.4   Lactate: 2.7            Exam:   Cardiac Rhythm:   Perfusion     [ ]Adequate   [ x]Inadequate  Mentation   [ ]Normal       [ x]Reduced  Extremities  [x ]Warm         [ ]Cool  Volume Status [ ]Hypervolemic [ x]Euvolemic [ ]Hypovolemic  Meds: metoprolol tartrate 25 milliGRAM(s) Oral every 8 hours  midodrine 20 milliGRAM(s) Oral every 8 hours      GI/NUTRITION  Exam: soft, NT/ND  Diet: tube feeds  Meds: diphenoxylate/atropine 2 Tablet(s) Oral every 6 hours  loperamide Liquid 4 milliGRAM(s) Enteral Tube every 6 hours  pantoprazole  Injectable 40 milliGRAM(s) IV Push every 12 hours  ursodiol Suspension 300 milliGRAM(s) Oral every 12 hours      GENITOURINARY  I&O's Detail    01-07 @ 07:01 - 01-08 @ 07:00  --------------------------------------------------------  IN:    Albumin 25%  -  50 mL: 250 mL    Albumin 5%  - 250 mL: 250 mL    Enteral Tube Flush: 380 mL    IV PiggyBack: 1050 mL    IV PiggyBack: 200 mL    Phenylephrine: 149.4 mL    Platelets - Single Donor: 450 mL    PRBCs (Packed Red Blood Cells): 600 mL    Vital1.5: 1560 mL  Total IN: 4889.4 mL    OUT:    Bulb (mL): 55 mL    Bulb (mL): 405 mL    Ileostomy (mL): 2125 mL    Indwelling Catheter - Urethral (mL): 40 mL    Other (mL): 1328 mL    VAC (Vacuum Assisted Closure) System (mL): 0 mL  Total OUT: 3953 mL    Total NET: 936.4 mL      01-08 @ 07:01 - 01-09 @ 00:43  --------------------------------------------------------  IN:    Albumin 25%  -  50 mL: 300 mL    Cryoprecipitate: 150 mL    Enteral Tube Flush: 520 mL    IV PiggyBack: 100 mL    IV PiggyBack: 400 mL    IV PiggyBack: 450 mL    Platelets - Single Donor: 450 mL    PRBCs (Packed Red Blood Cells): 300 mL    Vital1.5: 1040 mL  Total IN: 3710 mL    OUT:    Bulb (mL): 5 mL    Bulb (mL): 240 mL    Ileostomy (mL): 600 mL    Indwelling Catheter - Urethral (mL): 25 mL    Other (mL): 511 mL    VAC (Vacuum Assisted Closure) System (mL): 0 mL  Total OUT: 1381 mL    Total NET: 2329 mL          01-08    137  |  105  |  42[H]  ----------------------------<  171[H]  4.1   |  19[L]  |  0.32[L]    Ca    8.5      08 Jan 2025 23:55  Phos  3.4     01-08  Mg     2.3     01-08    TPro  4.2[L]  /  Alb  3.0[L]  /  TBili  17.2[H]  /  DBili  x   /  AST  39  /  ALT  37  /  AlkPhos  86  01-08    Meds: albumin human 25% IVPB 100 milliLiter(s) IV Intermittent every 6 hours      HEMATOLOGIC  Meds:                         8.7    2.61  )-----------( 27       ( 08 Jan 2025 23:55 )             25.8     PT/INR - ( 08 Jan 2025 23:55 )   PT: 28.8 sec;   INR: 2.55 ratio         PTT - ( 08 Jan 2025 23:55 )  PTT:43.2 sec    INFECTIOUS DISEASES  T(C): 36.4 (01-08-25 @ 23:00), Max: 36.5 (01-08-25 @ 03:00)  Wt(kg): --  WBC Count: 2.61 K/uL (01-08 @ 23:55)  WBC Count: 2.96 K/uL (01-08 @ 18:27)  WBC Count: 3.65 K/uL (01-08 @ 13:25)  WBC Count: 4.61 K/uL (01-08 @ 06:21)    Recent Cultures:  Specimen Source: .Blood BLOOD, 01-05 @ 05:00; Results   No growth at 72 Hours; Gram Stain: --; Organism: --  Specimen Source: .Blood BLOOD, 01-05 @ 02:20; Results   No growth at 72 Hours; Gram Stain: --; Organism: --    Meds: caspofungin IVPB 50 milliGRAM(s) IV Intermittent every 24 hours  caspofungin IVPB      cycloSPORINE  , modified (GENGRAF) Solution 75 milliGRAM(s) Oral <User Schedule>  ganciclovir IVPB 250 milliGRAM(s) IV Intermittent every 12 hours  imipenem/cilastatin  IVPB 500 milliGRAM(s) IV Intermittent every 6 hours  metroNIDAZOLE  IVPB 500 milliGRAM(s) IV Intermittent every 8 hours  trimethoprim  40 mG/sulfamethoxazole 200 mG Suspension 80 milliGRAM(s) Enteral Tube daily      ENDOCRINE  Capillary Blood Glucose    Meds: insulin lispro (ADMELOG) corrective regimen sliding scale   SubCutaneous every 6 hours  insulin NPH human recombinant 8 Unit(s) SubCutaneous every 6 hours  methylPREDNISolone sodium succinate Injectable 32 milliGRAM(s) IV Push <User Schedule>      ACCESS DEVICES:  [ ] Peripheral IV  [ ] Central Venous Line		[ ] R	[ ] L	[ ] IJ	[ ] Fem	[ ] SC	Placed:   [ ] Arterial Line			[ ] R	[ ] L	[ ] Fem	[ ] Rad	[ ] Ax	Placed:   [ ] PICC:					[ ] Mediport  [ ] Urinary Catheter, Date Placed:   [ ] Necessity of urinary, arterial, and venous catheters discussed    OTHER MEDICATIONS:  chlorhexidine 0.12% Liquid 15 milliLiter(s) Oral Mucosa every 12 hours  chlorhexidine 2% Cloths 1 Application(s) Topical <User Schedule>  collagenase Ointment 1 Application(s) Topical daily  CRRT Treatment    <Continuous>  FIRST- Mouthwash  BLM 10 milliLiter(s) Swish and Spit every 8 hours PRN  mupirocin 2% Ointment 1 Application(s) Topical every 12 hours  nystatin Powder 1 Application(s) Topical every 12 hours  Phoxillum Filtration BK 4 / 2.5 5000 milliLiter(s) CRRT <Continuous>  PrismaSATE Dialysate BGK 4 / 2.5 5000 milliLiter(s) CRRT <Continuous>  PrismaSOL Filtration BGK 0 / 2.5 5000 milliLiter(s) CRRT <Continuous>      IMAGING:

## 2025-01-09 NOTE — PROGRESS NOTE ADULT - ASSESSMENT
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 Post-op course c/b worsening mental status and acute hypoxic respiratory failure requiring multiple intubations/extubations, currently extubated (as of 11/26/24) and colonic ileus s/p several rounds of Relistor and Neostigmine with NGT and rectal tube currently in place now with septic shock due to ischemic bowel s/p total colectomy with ostomy creation. Pt. now septic with blood culture positive for clostridium paraputrificum and s/p PLEX 3 session for hyperbilirubinemia.  Transplant nephrology consulted for metabolic acidosis and MORAIMA.    1. s/p OLT 11/15/24 with oliguric MORAIMA in setting of septic shock.  Likely hemodynamically mediated in setting of cardiogenic and septic shock on multiple vasopressors and prior IABP.   - CT AP non-con: Bilateral renal cysts including cysts with peripheral calcification in the left kidney.  - Initiated on CRRT 12/7/24- 12/15/24 at 1AM stopped. s/p IHD 12/18/24 however required levophed.   - Has been back on CRRT but stopped 1/7/25 due to catheter malfunction. Restarted 1/8/25 at 8PM with new catheter.   - Continue CRRT today.   - If BP remains ok off of pressers can consider iHD by end of this week or early next week.    - Monitor labs and I/Os. Avoid nephrotoxins including, ACE/ARB, NSAIDs, contrast, etc. Dose medications as per CRRT.

## 2025-01-09 NOTE — PROGRESS NOTE ADULT - ASSESSMENT
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   s/p OLT 11/15 with complicated post op course    [] Septic shock/Cardiogenic shock  [] s/p Colectomy 12/7   [] RTOR for closure and Ileostomy creation   [] IAPB 12/7- removed 12/10  -> E Coli Bacteremia 12/6  -> Ec faecalis UTI (12/16)  -> Stenotrophaonas in trach apirate (12/19)  -> Ec Faecalis in Asc fluid (12/20) (12/26)  -> Clostridium paraputrifucum in blood 12/23, cultures have since cleared  - Tx ID following - on Imipenem/Caspo/Flagyl/Bactrim DS   - 1/1 CT CAP: small pleural effusions, enteritis, rest ok  - all lines changed over 1/3  - Wound care for sacral decubitus ulcer- add collagenase   - midodrine inc 20 q8h    [] MORAIMA:   -CRRT started 12/7, stopped 12/15, resumed CRRT 12/23  - still anuric       [ ] UGIB s/p EGD (12/26) showing generalized oozing, coagulopathy  - Correct coagulopathy per SICU  - Protonix IV BID  - TF at goal  - Vitamin K 3 days ends 1/5/25    [] s/p OLT  - poor graft function, trial of plasmapheresis 1/3, 1/5, 1/7 for (3-5 days), PLEX #4 1/9  - IVIG #1 1/8  - repeat liver US unchanged  - Diet: TFs to goal  - Pain management: avoid narcotics  - Strict I&Os, wound vac placed 12/10   - US: L brachial DVT (holding A/C)  - wound vac care  - ursodiol 300mgBID     [ ] Immunosuppression  - Tacrolimus stopped 11/18 in setting of AMS/Seizure, Started Cyclo 12/17  - Cyclo per level, MMF HELD, Solumedrol 32 mg daily    [] lleus -- resolving  -@ home on Linzess  - imaging with distended colon, improving, (likely opioid induced)  - s/p Neostigmine x 2  - s/p Relistor, last dose 12/1  - s/p colectomy with end ileostomy  (see above)  - ileostomy with >1L output, lomotil and imodium as needed, and change tube feeds  - Colorectal following  - replace losses as able    [] CMV viremia  - CMV PCR (12/11 and 12/16): neg, positive CMV PCR on 12/23  - CMV viral load incr from 537 to 1240 1/6; ganciclovir inc to BID     [ ] AMS  - Reintubated 11/20 Aspiration/hypoxia, extubated 11/24->[id number]/24--> extubated 11/26 -> [id number]/7 -> tracheostomy 12/17 -> trach collar trials starting 12/29  - EEG 11/30 negative, Neurology following  - BH following   - off tacro  - on keppra  -CT Head No Cont (12/20): Age-indeterminate posterior left frontal lobe infarct.   -CT Head (12/25): Evolving subacute infarct in the left supramarginal gyrus  -repeat CTH ok, poor MS   - melatonin QHS   - Seroquel 25mg QD per psych    [ ] HTN/ pAFib  [ ] coronary vasospasm vs posterior wall MI  - remains off all a/c, failed hep gtt/argatroban due to UGIB  - Cards- plan for PCI prior to DC   - Off Amiodarone, off dobutamine  - BB as needed  - Dr. 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   s/p OLT 11/15 with complicated post op course    [] Septic shock/Cardiogenic shock  [] s/p Colectomy 12/7   [] RTOR for closure and Ileostomy creation   [] IAPB 12/7- removed 12/10  -> E Coli Bacteremia 12/6  -> Ec faecalis UTI (12/16)  -> Stenotrophaonas in trach apirate (12/19)  -> Ec Faecalis in Asc fluid (12/20) (12/26)  -> Clostridium paraputrifucum in blood 12/23, cultures have since cleared  - Tx ID following - on Imipenem/Caspo/Flagyl/Bactrim DS   - 1/1 CT CAP: small pleural effusions, enteritis, rest ok  - all lines changed over 1/3  - Wound care for sacral decubitus ulcer- add collagenase   - midodrine inc 20 q8h    [] MORAIMA:   -CRRT started 12/7, stopped 12/15, resumed CRRT 12/23  - still anuric       [ ] UGIB s/p EGD (12/26) showing generalized oozing, coagulopathy  - Correct coagulopathy per SICU  - Protonix IV BID  - TF at goal  - Vitamin K 3 days ends 1/5/25    [] s/p OLT  - poor graft function, trial of plasmapheresis 1/3, 1/5, 1/7 for (3-5 days), PLEX #4 1/9  - IVIG w/ plex, started on 1/8, #2 1/9  - repeat liver US unchanged  - Diet: TFs to goal  - Pain management: avoid narcotics  - Strict I&Os, wound vac placed 12/10   - US: L brachial DVT (holding A/C)  - wound vac care  - ursodiol 300mgBID     [ ] Immunosuppression  - Tacrolimus stopped 11/18 in setting of AMS/Seizure, Started Cyclo 12/17  - Cyclo per level, MMF HELD, Solumedrol 32 mg daily    [] lleus -- resolving  -@ home on Linzess  - imaging with distended colon, improving, (likely opioid induced)  - s/p Neostigmine x 2  - s/p Relistor, last dose 12/1  - s/p colectomy with end ileostomy  (see above)  - ileostomy with >1L output, lomotil and imodium as needed, and change tube feeds  - Colorectal following  - replace losses as able    [] CMV viremia  - CMV PCR (12/11 and 12/16): neg, positive CMV PCR on 12/23  - CMV viral load incr from 537 to 1240 1/6; ganciclovir inc to BID     [ ] AMS  - Reintubated 11/20 Aspiration/hypoxia, extubated 11/24->[id number]/24--> extubated 11/26 -> [id number]/7 -> tracheostomy 12/17 -> trach collar trials starting 12/29  - EEG 11/30 negative, Neurology following  - BH following   - off tacro  - on keppra  -CT Head No Cont (12/20): Age-indeterminate posterior left frontal lobe infarct.   -CT Head (12/25): Evolving subacute infarct in the left supramarginal gyrus  -repeat CTH ok, poor MS   - melatonin QHS   - Seroquel 25mg QD per psych    [ ] HTN/ pAFib  [ ] coronary vasospasm vs posterior wall MI  - remains off all a/c, failed hep gtt/argatroban due to UGIB  - Cards- plan for PCI prior to DC   - Off Amiodarone, off dobutamine  - BB as needed  - Dr. Quintanilla following    [ ] DM  - per SICU     [] Scrotal abscess   - US (12/12): 2.1 x0.9 x 3.2 cm focal, right testicle- possible abscess (12/12)  - Urology recs: CT scrotum review no debridement required  - Urology recs Derm consult for guidance on wound care   - 12/23 US doppler scrotum: no arterial flow, limited venous flow, bl hydrocele  - 12/15 CT CAP no acute changes   - Elevate scrotum w/ support Urology signed off 12/29

## 2025-01-09 NOTE — PROGRESS NOTE ADULT - NS ATTEND AMEND GEN_ALL_CORE FT
74 yr M w liver failure, s/p OLT in nov 24 now w trach 12/17, bacteremia, return to OR, total colectomy, ileostomy creation, cardiogenic shock, oliguric renal failure requiring CRRT    ON: improved sleep, received transfusions plt + PRBC    deconditioned, sitting in chair, open eyes, and very limited movements of toes bilaterally  increase seroquel to promote better sleep  CPAP 5/5 30%, TC as long as he tolerates during day  portex 7 trach  AM CXR bibasilar pl effusions, left atelectasis?  persistent met acidosis gas, likely liver related  off kassandra   on home meto 25p q8hr  midodrine 20 q8hrs  fluctuating lactate, likely liver dysfunction related  TTE 12/11 EF 55%  NPO  NG tube feeds at goals  lomotil, imodium, ostomy 0.9 lit, banana flakes, tincture of opium   LFTs worsening, plasmapheresis today, IVIG  ursodiol  bulb 300cc output in total , wound vac  anuric, net balance even  albumin boluses q6hr  phimosis, skin irritation of area, given this keep nichols   CRRT -50 cc/hr  hb stable, severe thrombocytopenia, requiring frequent transfusions  no chem VTE PPX  ID transplant following: caspo, flagyl, imipenem gancyclovir per ID transplant  combicath steno, bcx clostridium, e fecal from abd  afebrile overnight  procal 2.26 (1/9)  glycemic control w ins nph 8 q6hr  prednisone for immunosuppression  family updated   PT when able    rt IJ THC

## 2025-01-10 NOTE — ADVANCED PRACTICE NURSE CONSULT - RECOMMEDATIONS
Will recommend:    1. Monitor output.  2. Empty pouch when 1/3-1/2 full   3. Change pouching system every 3-4 days & prn leakage  4. Contact ostomy specialists if questions, concerns/issues .  5. Supplies: Rousseau 2 1/4" Ceraplus flat skin barrier (#05380), Lianne 2 1/4" drainable pouch (#59967); Accessory products:  stoma paste #[id number], stoma powder (#7074) & Cavilon No sting barrier film wipe (#7104)  Will f/u for ostomy education when appropriate. Pt remains acute, requiring SICU care.

## 2025-01-10 NOTE — PROGRESS NOTE ADULT - SUBJECTIVE AND OBJECTIVE BOX
INTERVAL EVENTS:  - Increased seroquel to 50mg  - Plan for trach collar durin day and CPAP overnight   - Chest PT  - PLEX #4 and IVIG  - CRRT to -50cc  - given 1 platelet for platelet count of 12  - abdomen distended, held TF overnight. D/c opium tincture, lomotil. Decreased imodium from 4mg to 2mg q6 hours  - went in to afib with increasing tachycardia, given 250cc albumin bolus and made CVV even. Tachycardia improved    SUBJECTIVE/ROS:  [ ] A ten-point review of systems was otherwise negative except as noted.  [ ] Due to altered mental status/intubation, subjective information were not able to be obtained from the patient. History was obtained, to the extent possible, from review of the chart and collateral sources of information.      NEURO  Exam: weakly follows, waxes and wanes  Meds: melatonin 5 milliGRAM(s) Oral at bedtime  oxyCODONE    Solution 5 milliGRAM(s) Oral every 4 hours PRN Severe Pain (7 - 10)  oxyCODONE    Solution 2.5 milliGRAM(s) Oral every 4 hours PRN Moderate Pain (4 - 6)  QUEtiapine 50 milliGRAM(s) Oral at bedtime  [x] Adequacy of sedation and pain control has been assessed and adjusted      RESPIRATORY  RR: 24 (01-10-25 @ 00:00) (17 - 33)  SpO2: 96% (01-10-25 @ 00:00) (93% - 100%)  Exam: unlabored, clear to auscultation bilaterally  Mechanical Ventilation: Mode: CPAP with PS, RR (patient): 28, FiO2: 30, PEEP: 5, PS: 5, MAP: 7.5, PIP: 16  [N/A] Extubation Readiness Assessed  Meds: albuterol/ipratropium for Nebulization 3 milliLiter(s) Nebulizer every 6 hours PRN Shortness of Breath and/or Wheezing  buDESOnide    Inhalation Suspension 0.5 milliGRAM(s) Inhalation every 12 hours      CARDIOVASCULAR  HR: 89 (01-10-25 @ 00:00) (78 - 102)  BP: 109/55 (01-10-25 @ 00:00) (101/55 - 143/65)  BP(mean): 79 (01-10-25 @ 00:00) (75 - 94)  VBG - ( 09 Jan 2025 18:15 )  pH: 7.37  /  pCO2: 40    /  pO2: 50    / HCO3: 23    / Base Excess: -2.0  /  SaO2: 83.3   Lactate: 3.7      Exam: regular rate and rhythm  Cardiac Rhythm: sinus  Perfusion     [x]Adequate   [ ]Inadequate  Mentation   [x]Normal       [ ]Reduced  Extremities  [x]Warm         [ ]Cool  Volume Status [ ]Hypervolemic [x]Euvolemic [ ]Hypovolemic  Meds: metoprolol tartrate 25 milliGRAM(s) Oral every 8 hours  midodrine 20 milliGRAM(s) Oral every 8 hours      GI/NUTRITION  Exam: soft, nontender, nondistended  Diet: TF  Meds: loperamide Liquid 2 milliGRAM(s) Enteral Tube every 6 hours  pantoprazole  Injectable 40 milliGRAM(s) IV Push every 12 hours  ursodiol Suspension 300 milliGRAM(s) Oral every 12 hours      GENITOURINARY  I&O's Detail    01-08 @ 07:01  -  01-09 @ 07:00  --------------------------------------------------------  IN:    Albumin 25%  -  50 mL: 400 mL    Cryoprecipitate: 150 mL    Enteral Tube Flush: 620 mL    IV PiggyBack: 100 mL    IV PiggyBack: 450 mL    IV PiggyBack: 700 mL    Platelets - Single Donor: 450 mL    PRBCs (Packed Red Blood Cells): 300 mL    Vital1.5: 1495 mL  Total IN: 4665 mL    OUT:    Bulb (mL): 5 mL    Bulb (mL): 290 mL    Ileostomy (mL): 900 mL    Indwelling Catheter - Urethral (mL): 45 mL    Other (mL): 1015 mL    VAC (Vacuum Assisted Closure) System (mL): 0 mL  Total OUT: 2255 mL    Total NET: 2410 mL      01-09 @ 07:01  -  01-10 @ 00:31  --------------------------------------------------------  IN:    Albumin 25%  -  50 mL: 300 mL    Albumin 5%  - 250 mL: 350 mL    Enteral Tube Flush: 410 mL    IV PiggyBack: 350 mL    IV PiggyBack: 500 mL    Platelets - Single Donor: 225 mL    Vital1.5: 780 mL  Total IN: 2915 mL    OUT:    Bulb (mL): 300 mL    Ileostomy (mL): 800 mL    Indwelling Catheter - Urethral (mL): 5 mL    Other (mL): 1276 mL    VAC (Vacuum Assisted Closure) System (mL): 50 mL  Total OUT: 2431 mL    Total NET: 484 mL      01-09    139  |  104  |  40[H]  ----------------------------<  185[H]  4.2   |  19[L]  |  0.35[L]    Ca    9.0      09 Jan 2025 18:26  Phos  3.5     01-09  Mg     2.4     01-09    TPro  5.0[L]  /  Alb  3.5  /  TBili  13.1[H]  /  DBili  x   /  AST  37  /  ALT  24  /  AlkPhos  69  01-09    [ ] Castro catheter, indication: N/A  Meds:       HEMATOLOGIC  Meds:   [x] VTE Prophylaxis                        8.3    3.09  )-----------( 12       ( 09 Jan 2025 18:25 )             24.4     PT/INR - ( 09 Jan 2025 18:26 )   PT: 20.0 sec;   INR: 1.75 ratio         PTT - ( 09 Jan 2025 18:26 )  PTT:33.0 sec      INFECTIOUS DISEASES  WBC Count: 3.09 K/uL (01-09 @ 18:25)  WBC Count: 2.77 K/uL (01-09 @ 06:00)    RECENT CULTURES:  Specimen Source: .Blood BLOOD  Date/Time: 01-05 @ 05:00  Culture Results:   No growth at 4 days  Gram Stain: --  Organism: --  Specimen Source: .Blood BLOOD  Date/Time: 01-05 @ 02:20  Culture Results:   No growth at 4 days  Gram Stain: --  Organism: --    Meds: caspofungin IVPB      caspofungin IVPB 50 milliGRAM(s) IV Intermittent every 24 hours  cycloSPORINE  , modified (GENGRAF) Solution 75 milliGRAM(s) Oral <User Schedule>  ganciclovir IVPB 250 milliGRAM(s) IV Intermittent every 12 hours  imipenem/cilastatin  IVPB 500 milliGRAM(s) IV Intermittent every 6 hours  metroNIDAZOLE  IVPB 500 milliGRAM(s) IV Intermittent every 8 hours  trimethoprim  40 mG/sulfamethoxazole 200 mG Suspension 80 milliGRAM(s) Enteral Tube daily      ENDOCRINE  CAPILLARY BLOOD GLUCOSE  POCT Blood Glucose.: 204 mg/dL (09 Jan 2025 18:17)  POCT Blood Glucose.: 215 mg/dL (09 Jan 2025 12:48)  POCT Blood Glucose.: 149 mg/dL (09 Jan 2025 05:44)    Meds: insulin lispro (ADMELOG) corrective regimen sliding scale   SubCutaneous every 6 hours  insulin NPH human recombinant 8 Unit(s) SubCutaneous every 6 hours  methylPREDNISolone sodium succinate Injectable 32 milliGRAM(s) IV Push <User Schedule>      CODE STATUS: full code

## 2025-01-10 NOTE — PROGRESS NOTE ADULT - SUBJECTIVE AND OBJECTIVE BOX
Transplant ID follow up     Afebrile  Awake alert and smiling  Vital Signs Last 24 Hrs    T(C): 37 (10 Giancarlo 2025 22:00), Max: 37 (10 Giancarlo 2025 19:00)  T(F): 98.6 (10 Giancarlo 2025 22:00), Max: 98.6 (10 Giancarlo 2025 19:00)  HR: 82 (10 Giancarlo 2025 22:00) (72 - 91)  BP: 94/52 (10 Giancarlo 2025 22:00) (80/46 - 124/56)  BP(mean): 69 (10 Giancarlo 2025 22:00) (58 - 86)  RR: 36 (10 Giancarlo 2025 22:00) (18 - 41)  SpO2: 96% (10 Giancarlo 2025 22:00) (92% - 97%)    Parameters below as of 10 Giancarlo 2025 19:00  Patient On (Oxygen Delivery Method): tracheostomy collar      Mode: CPAP with PS  FiO2: 30  PEEP: 5  PS: 5  MAP: 9.4  PIP: 16      EXAM:  General: Patient in no acute distress, trached  CV: S1+S2, no m/r/g appreciated   Lungs: No respiratory distress, CTAB  Abd: Soft, no guarding, no rebound tenderness,  wound vac in place  distended  Ext: No cyanosis, no edema  Neuro: Intubated   Skin: No rash   IV: No phlebitis  scrotal necrosis          ____________________________________________________  ROS  GENERAL: denies chills, , night sweats, weight loss.   PSYCH: denies depression, anxiety, suicidal ideation, hallucination, and delusions  SKIN: no rash or lesions; no color changes, no abnormal nevi,no  dryness, and nojaundice    EYES: denies visual changes, floaters, pain, inflammation, blurred vision, and discharge  ENT: denies tinnitus, vertigo, epistaxis, oral lesion, and decreased acuity  PULM: denies, hemoptysis, pleurisy  CVS: denies angina, palpitations,+ orthopnea, no syncope, or heart murmur  GI: denies constipation, diarrhea, melena, abdominal pain, nausea.   : denies dysuria, frequency, discharge, incontinence, stones or macroscopic hematuria  MS: no arthralgias, no erythema or swelling, no myalgias, noedema, or lower back pain.   CNS: denies numbness, dizziness, seizure, or tremor  ENDO: denies heat/cold intolerance, polyuria, polydipsia, malaise.    HEME: denies bruising, bleeding, lymphadenopathy, anemia, and calf pain    Allergies  No Known Allergies    __________________________________________________  MEDS:  MEDICATIONS  (STANDING):  albuterol/ipratropium for Nebulization 3 every 6 hours PRN  buDESOnide    Inhalation Suspension 0.5 every 12 hours  cycloSPORINE  , modified (GENGRAF) Solution 75 <User Schedule>  diphenoxylate/atropine 2 every 6 hours  insulin lispro (ADMELOG) corrective regimen sliding scale  every 6 hours  insulin NPH human recombinant 6 every 6 hours  loperamide Liquid 2 every 6 hours  melatonin 5 at bedtime  methylPREDNISolone sodium succinate Injectable 32 <User Schedule>  metoprolol tartrate 25 every 8 hours  midodrine 20 every 8 hours  oxyCODONE    Solution 5 every 4 hours PRN  oxyCODONE    Solution 2.5 every 4 hours PRN  pantoprazole  Injectable 40 every 12 hours  phenylephrine    Infusion 0.1 <Continuous>  ursodiol Suspension 300 every 12 hours    _________________________________________________  ANTIMICOBIALS  caspofungin IVPB 50 every 24 hours  caspofungin IVPB    cycloSPORINE  , modified (GENGRAF) Solution 75 <User Schedule>  ganciclovir IVPB 250 every 24 hours  imipenem/cilastatin  IVPB 500 every 6 hours  metroNIDAZOLE  IVPB 500 every 8 hours  trimethoprim  40 mG/sulfamethoxazole 200 mG Suspension 80 daily      GENERAL LABS              9.2                  139  | 19   | 39           6.34  >-----------< 23      ------------------------< 168                   27.9                 4.3  | 108  | <0.30                                        Ca 8.5   Mg 2.3   Ph 3.0      Vancomycin Level, Random: <4.0 ( @ 06:19)  Vancomycin Level, Random: 9.0 ( @ 05:52)  Vancomycin Level, Random: 15.0 ( @ 06:24)  Vancomycin Level, Random: 9.3 ( @ 06:10)  Vancomycin Level, Random: 16.6 ( @ 06:25)  Vancomycin Level, Random: 16.3 ( @ 06:15)  Vancomycin Level, Random: 11.4 ( @ 06:17)  Vancomycin Level, Random: 4.4 ( @ 06:30)  Vancomycin Level, Random: 8.5 ( @ 06:25)    Urinalysis Basic - ( 2025 06:08 )    Color: x / Appearance: x / SG: x / pH: x  Gluc: 168 mg/dL / Ketone: x  / Bili: x / Urobili: x   Blood: x / Protein: x / Nitrite: x   Leuk Esterase: x / RBC: x / WBC x   Sq Epi: x / Non Sq Epi: x / Bacteria: x        _________________________________________________  MICROBIOLOGY  -----------    Culture - Blood (collected 2025 05:00)  Source: .Blood BLOOD  Preliminary Report (2025 09:01):    No growth at 48 Hours    Culture - Blood (collected 2025 02:20)  Source: .Blood BLOOD  Preliminary Report (2025 09:01):    No growth at 48 Hours            CMVPCR Log: 3.09 Tof98PL/mL ( @ 00:25)  CMVPCR Lo.73 Vrj11GQ/mL ( @ 13:36)    Clostridium difficile GDH Toxins A&amp;B, EIA:   Negative (25 @ 12:59)  Clostridium difficile GDH Interpretation: Negative for toxigenic C. Difficile.  This specimen is negative for C.  Difficile glutamate dehydrogenase (GDH) antigen and negative for C.  Difficile Toxins A & B, by EIA.  GDH is a highly sensitive screening  marker for C. Difficile that is produced in large amounts by all C.  Difficile strains, both toxigenic and nontoxigenic.  This assay has not  been validated as a test of cure.  Repeat testing during the same episode  of diarrhea is of limited value and is discouraged.  The results of this  assay should always be interpreted in conjunction with patient's clinical  history. (25 @ 12:59)        Fungitell:   _______________________________________________  PERTINENT IMAGING

## 2025-01-10 NOTE — PROGRESS NOTE ADULT - ASSESSMENT
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 Post op course complicated by acute hypoxic respiratory failure requiring intubation multiple times, most recently on 12/7 with conversion to tracheostomy on 12/17, e.coli bacteremia with RTOR on 12/7 for concern for worsening septic shock and cardiogenic shock s/p balloon pump placement and total abdominal colectomy in setting of ischemic bowel s/p OR on 12/9 for ileostomy creation, and olirugirc MORAIMA requiring CRRT and now intermittent HD.    Diagnosed with pneumonia secondary to Stenotrophomonas    Also with growth of Enterococcus faecalis from abdominal drains and urine culture    More fever and shock event on 12/29/24 likely 2/2 GIB    UA (12/19) 2 WBC  BCx (12/23) Clostridium paraputrificum  Bronch Cx (12/23) NGTD    CT Chest (12/23) Bibasilar groundglass and tree in bud opacities which may represent distal airway impaction versus pneumonia.    CT A/P (12/23) Peripheral wedge-shaped areas of hypoattenuation involving the superior aspect of the spleen, suggestive of splenic infarcts (12-59). Mildly dilated loops of proximal small bowel without transition point, likely ileus.    Testicular US (12/23) No appreciable arterial flow with very limited venous flow in in the testes bilaterally. Interval decrease in diffuse scrotal edema. Small bilateral hydroceles.    CT Pelvis (12/25) Moderate diffuse subcutaneous/scrotal edema without defined collection or subcutaneous air.    BCX (12/28): Gram variable rods (Blood PCR neg)    Antibiotic Course:  Meropenem ( 11/22-11/29, 11/22-11/29, 12/16-)   Minocycline (12/21-1/1)  Bactrim (11/16-11/24, 12/8-12/11, 12/21-)  Fluconazole (11/16-12/2, 12/12-12/16)   Caspofungin ( 12/6-12/11, 12/17-)  Cefepime (12/10-12/16)   Metronidazole (12/10-12/14)   Ganciclovir (12/7-12/13)   Linezolid (11/23-11/26, 12/6-12/11, 12/28-12/29)   Atovaquone (11/29-12/6)   Zosyn (11/20-11/22,12/6)   Tobramycin (12/6)   Valganciclovir (11/6-12/4)    #Positive Blood Culture (12/23 Clostridium paraputrificum) (12/28: Gram variable rods -Blood PCR neg)  Clostridium paraputrificum is generally penicillin and metronidazole susceptible      #Leukocytosis, Fever, Shock, Transaminitis,   #Stenotrophomonas tracheo/Pneumonia? s/p minocycline course  # polymicrobial bacteremia E faecalis; Clostridium spp in c/o Gi pathology but also necrotic scrotum     --If further worsening shock overnight would repeat blood cultures and administer Tobramycin 600 mg IV x1 (7 mg/kg based on adjusted body weight).   --CT C/A/P showing Small bilateral pleural effusions with associated compressive atelectasis, increased since prior exam. Mild distention and mural thickening of small bowel of concern for enteritis. Status post liver transplant and colectomy with stable postsurgical changes.  --s/p 10 day course of Minocycline 200 mg IV Q12H (12/21 >1/1) for Stenorophomnas   --Continue flagyl 500mg Q8, and  Imipenem 500mg Q6- anticipate total of 14 days of imipenem,  Would now discontinue flagyl  --remains on Caspofungin;   -Decrease immunosuppression if possible       # elevated BiT, persists- s/p trial of plasmapheresis  Multifactorial, contribution of medication? penems?    #Liver Transplant Recipient, Prophylactic Antibiotic  --Repeat CMV detectable on 12/31  --Given thrombocytopenia can consider holding Bactrim or now  Cytomegalovirus By PCR: Det <34.5 IU/mL (12.23.24 @ 06:15)  Cytomegalovirus By PCR: 537 IU/mL (12.31.24 @ 13:36)  --restarted  ganciclovir IV adjusted for CVVH dosing- but at 2.5 mg/kg daily- now CMV PCR 1200--> would increase ganciclovir to 2.5 mg/kg q12h  check CMV PCR next monday      Thank you for involving us in the care of this patient  Transplant ID will continue to follow- Dr Carrero will bee available over the weekend for questions  Please call or page with additional questions  Pager; #[phone]  Teams: from 8 am to 5 pm  Rachele Lewis MD

## 2025-01-10 NOTE — PROGRESS NOTE ADULT - NS ATTEND AMEND GEN_ALL_CORE FT
74 yr M w liver failure, s/p OLT in nov 24 now w trach 12/17, bacteremia, return to OR, total colectomy, ileostomy creation, cardiogenic shock, oliguric renal failure requiring CRRT    ON: abd distention, afib rvr    deconditioned, sitting in chair, open eyes, and very limited movements of toes bilaterally  seroquel qhs, slept better   TC day time 12 hr goal, CPAP/PSV ON  portex 7 trach  AM CXR bibasilar pl effusions  persistent met acidosis gas, likely liver related  home meto 25p q8hr  midodrine 20 q8hrs  fluctuating lactate, likely liver dysfunction related  TTE 12/11 EF 55%  NPO  NG tube feeds on hold  imodium, ostomy: 800cc  LFTs worsening, plasmapheresis, IVIG intermittently  ursodiol  bulb 700cc output in total , wound vac  anuric, net balance slight post  phimosis, skin irritation of area, given this keep nichols   CRRT even  hb stable, severe thrombocytopenia, requiring frequent transfusions, plt today  no chem VTE PPX  ID transplant following: caspo, flagyl, imipenem gancyclovir per ID transplant  combicath steno, bcx clostridium, e fecal from abd  afebrile overnight, chronic leukopenia likely from chronic   procal 2.26 (1/9)  glycemic control w ins nph 8 q6hr  steroid for immunosuppression  family at bedside   PT when able    rt IJ THC (1/8)

## 2025-01-10 NOTE — PROGRESS NOTE ADULT - ASSESSMENT
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   s/p OLT 11/15 with complicated post op course    [] Septic shock/Cardiogenic shock  [] s/p Colectomy 12/7   [] RTOR for closure and Ileostomy creation   [] IAPB 12/7- removed 12/10  -> E Coli Bacteremia 12/6  -> Ec faecalis UTI (12/16)  -> Stenotrophaonas in trach apirate (12/19)  -> Ec Faecalis in Asc fluid (12/20) (12/26)  -> Clostridium paraputrifucum in blood 12/23, cultures have since cleared  - Tx ID following - on Imipenem/Caspo/Flagyl/Bactrim DS   - 1/1 CT CAP: small pleural effusions, enteritis, rest ok  - all lines changed over 1/3  - Wound care for sacral decubitus ulcer- add collagenase   - midodrine inc 20 q8h    [] MORAIMA:   -CRRT started 12/7, stopped 12/15, resumed CRRT 12/23  - still anuric       [ ] UGIB s/p EGD (12/26) showing generalized oozing, coagulopathy  - Correct coagulopathy per SICU  - Protonix IV BID  - TF at goal  - Vitamin K 3 days ends 1/5/25    [] s/p OLT  - poor graft function, trial of plasmapheresis 1/3, 1/5, 1/7 for (3-5 days), PLEX #4 1/9  - IVIG w/ plex, started on 1/8, #2 1/9  - repeat liver US unchanged  - Diet: TFs to goal  - Pain management: avoid narcotics  - Strict I&Os, wound vac placed 12/10   - US: L brachial DVT (holding A/C)  - wound vac care  - ursodiol 300mgBID     [ ] Immunosuppression  - Tacrolimus stopped 11/18 in setting of AMS/Seizure, Started Cyclo 12/17  - Cyclo per level, MMF HELD, Solumedrol 32 mg daily    [] lleus -- resolving  -@ home on Linzess  - imaging with distended colon, improving, (likely opioid induced)  - s/p Neostigmine x 2  - s/p Relistor, last dose 12/1  - s/p colectomy with end ileostomy  (see above)  - ileostomy with >1L output, lomotil and imodium as needed, and change tube feeds  - Colorectal following  - replace losses as able    [] CMV viremia  - CMV PCR (12/11 and 12/16): neg, positive CMV PCR on 12/23  - CMV viral load incr from 537 to 1240 1/6; ganciclovir inc to BID     [ ] AMS  - Reintubated 11/20 Aspiration/hypoxia, extubated 11/24->[id number]--> extubated 11/26 -> [id number] -> tracheostomy 12/17 -> trach collar trials starting 12/29  - EEG 11/30 negative, Neurology following  - BH following   - off tacro  - on keppra  -CT Head No Cont (12/20): Age-indeterminate posterior left frontal lobe infarct.   -CT Head (12/25): Evolving subacute infarct in the left supramarginal gyrus  -repeat CTH ok, poor MS   - melatonin QHS   - Seroquel 25mg QD per psych    [ ] HTN/ pAFib  [ ] coronary vasospasm vs posterior wall MI  - remains off all a/c, failed hep gtt/argatroban due to UGIB  - Cards- plan for PCI prior to DC   - Off Amiodarone, off dobutamine  - BB as needed  - Dr. 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   s/p OLT 11/15 with complicated post op course    [] Septic shock/Cardiogenic shock  [] s/p Colectomy 12/7   [] RTOR for closure and Ileostomy creation   [] IAPB 12/7- removed 12/10  -> E Coli Bacteremia 12/6  -> Ec faecalis UTI (12/16)  -> Stenotrophaonas in trach apirate (12/19)  -> Ec Faecalis in Asc fluid (12/20) (12/26)  -> Clostridium paraputrifucum in blood 12/23, cultures have since cleared  - Tx ID following - on Imipenem/Caspo/Flagyl/Bactrim DS   - 1/1 CT CAP: small pleural effusions, enteritis, rest ok  - all lines changed over 1/3  - Wound care for sacral decubitus ulcer  - midodrine inc 20 q8h    [] MORAIMA:   -CRRT started 12/7, stopped 12/15, resumed CRRT 12/23  - still anuric       [ ] UGIB s/p EGD (12/26) showing generalized oozing, coagulopathy  - Correct coagulopathy per SICU  - Protonix IV BID  - TF at goal    [] s/p OLT  - poor graft function, trial of plasmapheresis 1/3, 1/5, 1/7 for (3-5 days), PLEX #4 1/9, #5 tomorrow  - IVIG #1 on 1/8, #2 1/9, #3 tomorrow  - repeat liver US unchanged  - Diet: TFs to goal  - Pain management: avoid narcotics  - Strict I&Os, wound vac placed 12/10   - US: L brachial DVT (holding A/C)  - wound vac care  - ursodiol 300mgBID     [ ] Immunosuppression  - Tacrolimus stopped 11/18 in setting of AMS/Seizure, Started Cyclo 12/17  - Cyclo per level, MMF HELD, Solumedrol 32 mg daily    [] lleus -- resolving  -@ home on Linzess  - imaging with distended colon, improving, (likely opioid induced)  - s/p Neostigmine x 2  - s/p Relistor, last dose 12/1  - s/p colectomy with end ileostomy  (see above)  - ileostomy with >1L output, lomotil and imodium as needed, and change tube feeds  - Colorectal following  - replace losses as able    [] CMV viremia  - CMV PCR (12/11 and 12/16): neg, positive CMV PCR on 12/23  - CMV viral load incr from 537 to 1240 1/6; ganciclovir inc to BID, next check 1/13      [ ] AMS  - Reintubated 11/20 Aspiration/hypoxia, extubated 11/24->[id number]/24--> extubated 11/26 -> [id number]/7 -> tracheostomy 12/17 -> trach collar trials starting 12/29  - EEG 11/30 negative, Neurology following  - BH following   - off tacro  - on keppra  -CT Head No Cont (12/20): Age-indeterminate posterior left frontal lobe infarct.   -CT Head (12/25): Evolving subacute infarct in the left supramarginal gyrus  -repeat CTH ok, poor MS   - melatonin QHS   - Seroquel 25mg QD per psych    [ ] HTN/ pAFib  [ ] coronary vasospasm vs posterior wall MI  - remains off all a/c, failed hep gtt/argatroban due to UGIB  - Cards- plan for PCI prior to DC   - Off Amiodarone, off dobutamine  - BB as needed  - Dr. Quintanilla following    [ ] DM  - per SICU     [] Scrotal abscess   - US (12/12): 2.1 x0.9 x 3.2 cm focal, right testicle- possible abscess (12/12)  - Urology recs: CT scrotum review no debridement required  - Urology recs Derm consult for guidance on wound care   - 12/23 US doppler scrotum: no arterial flow, limited venous flow, bl hydrocele  - 12/15 CT CAP no acute changes   - Elevate scrotum w/ support Urology signed off 12/29

## 2025-01-10 NOTE — PROGRESS NOTE ADULT - SUBJECTIVE AND OBJECTIVE BOX
[facility] DIVISION OF KIDNEY DISEASES AND HYPERTENSION -- FOLLOW UP NOTE  --------------------------------------------------------------------------------  Chief Complaint: Oliguric MORAIMA in OLT transplant     24 hour events/subjective: Pt. seen and examined at bedside earlier today with son-in-law at bedside. Pt. sitting up in chair, eyes open, tracking. Remains on trach.       PAST HISTORY  --------------------------------------------------------------------------------  No significant changes to PMH, PSH, FHx, SHx, unless otherwise noted    ALLERGIES & MEDICATIONS  --------------------------------------------------------------------------------  Allergies    No Known Allergies    Intolerances      Standing Inpatient Medications  buDESOnide    Inhalation Suspension 0.5 milliGRAM(s) Inhalation every 12 hours  caspofungin IVPB      caspofungin IVPB 50 milliGRAM(s) IV Intermittent every 24 hours  chlorhexidine 0.12% Liquid 15 milliLiter(s) Oral Mucosa every 12 hours  chlorhexidine 2% Cloths 1 Application(s) Topical <User Schedule>  collagenase Ointment 1 Application(s) Topical daily  CRRT Treatment    <Continuous>  cycloSPORINE  , modified (GENGRAF) Solution 50 milliGRAM(s) Oral <User Schedule>  ganciclovir IVPB 250 milliGRAM(s) IV Intermittent every 12 hours  imipenem/cilastatin  IVPB 500 milliGRAM(s) IV Intermittent every 6 hours  insulin lispro (ADMELOG) corrective regimen sliding scale   SubCutaneous every 6 hours  insulin NPH human recombinant 8 Unit(s) SubCutaneous every 6 hours  loperamide Liquid 2 milliGRAM(s) Enteral Tube every 6 hours  melatonin 5 milliGRAM(s) Oral at bedtime  methylPREDNISolone sodium succinate Injectable 32 milliGRAM(s) IV Push <User Schedule>  metoprolol tartrate 25 milliGRAM(s) Oral every 8 hours  metroNIDAZOLE  IVPB 500 milliGRAM(s) IV Intermittent every 8 hours  midodrine 20 milliGRAM(s) Oral every 8 hours  mupirocin 2% Ointment 1 Application(s) Topical every 12 hours  nystatin Powder 1 Application(s) Topical every 12 hours  pantoprazole  Injectable 40 milliGRAM(s) IV Push every 12 hours  Phoxillum Filtration BK 4 / 2.5 5000 milliLiter(s) CRRT <Continuous>  PrismaSATE Dialysate BGK 4 / 2.5 5000 milliLiter(s) CRRT <Continuous>  PrismaSOL Filtration BGK 0 / 2.5 5000 milliLiter(s) CRRT <Continuous>  QUEtiapine 50 milliGRAM(s) Oral at bedtime  trimethoprim  40 mG/sulfamethoxazole 200 mG Suspension 80 milliGRAM(s) Enteral Tube daily  ursodiol Suspension 300 milliGRAM(s) Oral every 12 hours    PRN Inpatient Medications  albuterol/ipratropium for Nebulization 3 milliLiter(s) Nebulizer every 6 hours PRN  FIRST- Mouthwash  BLM 10 milliLiter(s) Swish and Spit every 8 hours PRN  oxyCODONE    Solution 5 milliGRAM(s) Oral every 4 hours PRN  oxyCODONE    Solution 2.5 milliGRAM(s) Oral every 4 hours PRN      REVIEW OF SYSTEMS  --------------------------------------------------------------------------------  Unable to obtain ROS due to current clinical status.     VITALS/PHYSICAL EXAM  --------------------------------------------------------------------------------  T(C): 36.9 (01-10-25 @ 11:00), Max: 36.9 (01-10-25 @ 05:00)  HR: 84 (01-10-25 @ 12:00) (72 - 102)  BP: 103/55 (01-10-25 @ 12:00) (96/53 - 121/58)  RR: 23 (01-10-25 @ 12:00) (18 - 33)  SpO2: 96% (01-10-25 @ 12:00) (92% - 97%)  Wt(kg): --        01-09-25 @ 07:01  -  01-10-25 @ 07:00  --------------------------------------------------------  IN: 3905 mL / OUT: 3520 mL / NET: 385 mL    01-10-25 @ 07:01  -  01-10-25 @ 13:04  --------------------------------------------------------  IN: 620 mL / OUT: 453 mL / NET: 167 mL      Physical Exam:  Gen: on vent via tracheostomy. opens eyes.  HEENT: Icterus present, +NGT  Abdomen: + ileostomy with liquid brown stool, VAC dressing present   MSK: +LE edema and UE edema.   Skin: Superficial skin ulceration b/l thighs/dark necrotic appearing scrotum.   Vascular: Right IJ temporary dialysis catheter     LABS/STUDIES  --------------------------------------------------------------------------------              8.4    2.43  >-----------<  17       [01-10-25 @ 06:30]              25.2     138  |  103  |  37  ----------------------------<  147      [01-10-25 @ 06:30]  4.2   |  20  |  <0.30        Ca     8.3     [01-10-25 @ 06:30]      Mg     2.4     [01-10-25 @ 06:30]      Phos  3.3     [01-10-25 @ 06:30]    TPro  4.4  /  Alb  3.4  /  TBili  18.6  /  DBili  x   /  AST  41  /  ALT  26  /  AlkPhos  74  [01-10-25 @ 06:30]    PT/INR: PT 28.8 , INR 2.53       [01-10-25 @ 06:30]  PTT: 41.5       [01-10-25 @ 06:30]      Creatinine Trend:  SCr <0.30 [01-10 @ 06:30]  SCr <0.30 [01-10 @ 00:39]  SCr 0.35 [01-09 @ 18:26]  SCr 0.40 [01-09 @ 13:38]  SCr <0.30 [01-09 @ 06:00]      Urinalysis - [01-10-25 @ 06:30]      Color  / Appearance  / SG  / pH       Gluc 147 / Ketone   / Bili  / Urobili        Blood  / Protein  / Leuk Est  / Nitrite       RBC  / WBC  / Hyaline  / Gran  / Sq Epi  / Non Sq Epi  / Bacteria       Vitamin D (25OH) <6.0      [11-21-24 @ 08:32]  TSH 0.68      [12-12-24 @ 12:27]  Lipid: chol 114, , HDL 47, LDL --      [04-24-24 @ 06:48]          IMAGING/RADIOLOGY: Reviewed.

## 2025-01-10 NOTE — PROGRESS NOTE ADULT - SUBJECTIVE AND OBJECTIVE BOX
Transplant Surgery - Multidisciplinary Rounds  --------------------------------------------------------------  OLT   11/15/2024         Colectomy 12/7/2024     IABP 12/7 removed 12/10  Tracheostomy 12/17/2024    Present:   Patient seen and examined with multidisciplinary Transplant team including Surgeon: Dr. Dagher, Hepatologist Dr. Luis, JAZZ Velasquez and bedside RN in AM rounds.   Disciplines not in attendance will be notified of the plan.     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   s/p OLT 11/15 with post op course c/b:  ·	Hypoxia: Reintubated 11/20, extubated 11/24->reintubated 11/24, extubated 11/26, reintubated 12/7, trached 12/17  ·	Ileus   ·	A fib  ·	AMS/Seizure   ·	L brachial DVT  ·	Neutropenia  ·	E coli Bacteremia (12/6)  ·	s/p Colectomy 12/7.   ·	IABP 12/7, removed 12/10  ·	Ec Faecalis UTI (12/16)  ·	Stenotrophamonas in trach aspirate (12/19)  ·	Clostridium in blood 12/23  ·	UGIB 12/26  ·	CMV Viremia  ·	MORAIMA requiring CRRT  ·	Shock liver    Interval/Overnight Events:   - afebrile, x1 ep hypothermia.  - tachycardic c/f afib - albumin bolus given  - 1u PRBC, 1u PLT  - remains off pressors  - CRRT net even    Immunosuppression:  - Cyclo per level, MMF HELD, Solu 32  -ongoing monitoring for signs of rejection                                                      ROS: Unable to assess patient is lethargic, s/p tracheostomy    PHYSICAL EXAM:   Constitutional: trach to vent, awake, unresponsive  Eyes:  PERRLA, Scleral icterus  ENMT: nc/at, no thrush, NGT  Neck: supple, trach   Respiratory: CTA B/L  Cardiovascular: RRR  Gastrointestinal: incision clean/dry/intact + Ostomy pink output stool, wound vac, peritoneal drains x2 bilious   Genitourinary: +scrotal edema w/ large fluid collection; black/green discoloration  Extremities: SCD's in place and working bilaterally, + LE edema  Neurological: trach to vent, opens eyes to voice   Skin: large sacral decub, poor healing with breakdown Transplant Surgery - Multidisciplinary Rounds  --------------------------------------------------------------  OLT   11/15/2024         Colectomy 12/7/2024     IABP 12/7 removed 12/10  Tracheostomy 12/17/2024    Present:   Patient seen and examined with multidisciplinary Transplant team including Surgeon: Dr. Dagher, Hepatologist Dr. Luis, JAZZ Velasquez and bedside RN in AM rounds.   Disciplines not in attendance will be notified of the plan.     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   s/p OLT 11/15 with post op course c/b:  ·	Hypoxia: Reintubated 11/20, extubated 11/24->reintubated 11/24, extubated 11/26, reintubated 12/7, trached 12/17  ·	Ileus   ·	A fib  ·	AMS/Seizure   ·	L brachial DVT  ·	Neutropenia  ·	E coli Bacteremia (12/6)  ·	s/p Colectomy 12/7.   ·	IABP 12/7, removed 12/10  ·	Ec Faecalis UTI (12/16)  ·	Stenotrophamonas in trach aspirate (12/19)  ·	Clostridium in blood 12/23  ·	UGIB 12/26  ·	CMV Viremia  ·	MORAIMA requiring CRRT  ·	Shock liver    Interval/Overnight Events:   - afebrile, x1 ep hypothermia.  - tachycardic c/f afib - albumin bolus given  - 1u PRBC, 1u PLT  - remains off pressors  - CRRT net even  - tolerating PLEX    Immunosuppression:  - Cyclo per level, MMF HELD, Solu 32  -ongoing monitoring for signs of rejection        MEDICATIONS  (STANDING):  buDESOnide    Inhalation Suspension 0.5 milliGRAM(s) Inhalation every 12 hours  caspofungin IVPB      caspofungin IVPB 50 milliGRAM(s) IV Intermittent every 24 hours  chlorhexidine 0.12% Liquid 15 milliLiter(s) Oral Mucosa every 12 hours  chlorhexidine 2% Cloths 1 Application(s) Topical <User Schedule>  collagenase Ointment 1 Application(s) Topical daily  CRRT Treatment    <Continuous>  cycloSPORINE  , modified (GENGRAF) Solution 50 milliGRAM(s) Oral <User Schedule>  ganciclovir IVPB 250 milliGRAM(s) IV Intermittent every 12 hours  imipenem/cilastatin  IVPB 500 milliGRAM(s) IV Intermittent every 6 hours  insulin lispro (ADMELOG) corrective regimen sliding scale   SubCutaneous every 6 hours  insulin NPH human recombinant 8 Unit(s) SubCutaneous every 6 hours  loperamide Liquid 2 milliGRAM(s) Enteral Tube every 6 hours  melatonin 5 milliGRAM(s) Oral at bedtime  methylPREDNISolone sodium succinate Injectable 32 milliGRAM(s) IV Push <User Schedule>  metoprolol tartrate 25 milliGRAM(s) Oral every 8 hours  metroNIDAZOLE  IVPB 500 milliGRAM(s) IV Intermittent every 8 hours  midodrine 20 milliGRAM(s) Oral every 8 hours  mupirocin 2% Ointment 1 Application(s) Topical every 12 hours  nystatin Powder 1 Application(s) Topical every 12 hours  pantoprazole  Injectable 40 milliGRAM(s) IV Push every 12 hours  Phoxillum Filtration BK 4 / 2.5 5000 milliLiter(s) (1200 mL/Hr) CRRT <Continuous>  PrismaSATE Dialysate BGK 4 / 2.5 5000 milliLiter(s) (1500 mL/Hr) CRRT <Continuous>  PrismaSOL Filtration BGK 0 / 2.5 5000 milliLiter(s) (200 mL/Hr) CRRT <Continuous>  QUEtiapine 50 milliGRAM(s) Oral at bedtime  trimethoprim  40 mG/sulfamethoxazole 200 mG Suspension 80 milliGRAM(s) Enteral Tube daily  ursodiol Suspension 300 milliGRAM(s) Oral every 12 hours    MEDICATIONS  (PRN):  albuterol/ipratropium for Nebulization 3 milliLiter(s) Nebulizer every 6 hours PRN Shortness of Breath and/or Wheezing  FIRST- Mouthwash  BLM 10 milliLiter(s) Swish and Spit every 8 hours PRN Mouth Care  oxyCODONE    Solution 5 milliGRAM(s) Oral every 4 hours PRN Severe Pain (7 - 10)  oxyCODONE    Solution 2.5 milliGRAM(s) Oral every 4 hours PRN Moderate Pain (4 - 6)      PAST MEDICAL & SURGICAL HISTORY:  Diabetes      Transaminitis      Paroxysmal atrial fibrillation      Depression      BPH (benign prostatic hyperplasia)      Hypertension      Chronic atrial fibrillation      Coronary artery disease      Hepatocellular carcinoma      DM (diabetes mellitus)      HTN (hypertension)      Paroxysmal atrial fibrillation      Cirrhosis      HCC (hepatocellular carcinoma)      History of BPH      History of laparoscopic cholecystectomy      History of lumbar laminectomy      H/O prior ablation treatment      H/O percutaneous left heart catheterization          Vital Signs Last 24 Hrs  T(C): 36.9 (10 Giancarlo 2025 11:00), Max: 36.9 (10 Giancarlo 2025 05:00)  T(F): 98.4 (10 Giancarlo 2025 11:00), Max: 98.4 (10 Giancarlo 2025 05:00)  HR: 83 (10 Giancarlo 2025 11:00) (72 - 102)  BP: 104/55 (10 Giancarlo 2025 11:00) (96/53 - 139/64)  BP(mean): 77 (10 Giancarlo 2025 11:00) (72 - 92)  RR: 26 (10 Giancarlo 2025 11:00) (18 - 33)  SpO2: 92% (10 Giancarlo 2025 11:00) (92% - 100%)    Parameters below as of 10 Giancarlo 2025 11:00  Patient On (Oxygen Delivery Method): tracheostomy collar        I&O's Summary    09 Jan 2025 07:01  -  10 Giancarlo 2025 07:00  --------------------------------------------------------  IN: 3905 mL / OUT: 3520 mL / NET: 385 mL    10 Giancarlo 2025 07:01  -  10 Giancarlo 2025 11:23  --------------------------------------------------------  IN: 555 mL / OUT: 353 mL / NET: 202 mL                              8.4    2.43  )-----------( 17       ( 10 Giancarlo 2025 06:30 )             25.2     01-10    138  |  103  |  37[H]  ----------------------------<  147[H]  4.2   |  20[L]  |  <0.30[L]    Ca    8.3[L]      10 Giancarlo 2025 06:30  Phos  3.3     01-10  Mg     2.4     01-10    TPro  4.4[L]  /  Alb  3.4  /  TBili  18.6[H]  /  DBili  x   /  AST  41[H]  /  ALT  26  /  AlkPhos  74  01-10          Culture - Blood (collected 01-05-25 @ 05:00)  Source: .Blood BLOOD  Final Report (01-10-25 @ 09:01):    No growth at 5 days    Culture - Blood (collected 01-05-25 @ 02:20)  Source: .Blood BLOOD  Final Report (01-10-25 @ 09:01):    No growth at 5 days          ROS: Unable to assess patient is lethargic, s/p tracheostomy    PHYSICAL EXAM:   Constitutional: trach to vent, awake, unresponsive  Eyes:  PERRLA, Scleral icterus  ENMT: nc/at, no thrush, NGT  Neck: supple, trach   Respiratory: CTA B/L  Cardiovascular: RRR  Gastrointestinal: incision clean/dry/intact + Ostomy pink output stool, wound vac, peritoneal drains x1 bilious   Genitourinary: +scrotal edema w/ large fluid collection; black/green discoloration  Extremities: SCD's in place and working bilaterally, + LE edema  Neurological: trach to vent, opens eyes to voice   Skin: large sacral decub, poor healing with breakdown

## 2025-01-10 NOTE — ADVANCED PRACTICE NURSE CONSULT - ASSESSMENT
Chart reviewed and events noted.   In to see patient with PT wound (All) for routine vac dressing and complete ostomy pouching system changes.  Transplant team at the bedside prior to vac dressing changes.  Son notified of the reason of the visit and gave consent for team to proceed.  Patient remains non verbal and lethargic in SICU receiving hemodialysis at the bedside.  Transplant team at the bedside to evaluate mid abdominal surgical incision wounds.  Wound vac dressings on hold at this time per transplant team due to bleeding from the wounds (see PT wound note for details).  Complete pouching system changed:  Stoma 1 3/8" discolored tan/yellowish on the top and dark brown slough at the base circumferentially.  Transplant team at the bedside and aware of the findings.  No bleeding from stoma.  Stoma is functioning for bilious brown drainage, Peristomal skin and mucocutaneous junction intact.  Adherent brown slough  VS necrosis at mucocutaneous junction circumferentially.  Creases/skin indents at 3 and 9 o'clock. Dusted the peristomal skin with stoma powder excess removed.  Dab in with Cavilon 3M no sting barrier liquid film.  Re pouched w/ 2 1/4" flat skin barrier.  Bead of stoma paste applied to skin creases to level the surface and at the back of skin barrier near opening to caulk & high output pouch re- attached to bedside drainage bag. Supplies and pattern  left at the bedside.  Clinical RN Radha Vera at the bedside and discussed the plan of care with her.  Will continue to follow up. Chart reviewed and events noted.   In to see patient with PT wound (All) for routine vac dressing and complete ostomy pouching system changes.  Transplant team at the bedside prior to vac dressing changes.  Son notified of the reason of the visit and gave consent for team to proceed.  Patient remains non verbal and lethargic in SICU receiving CRRT at the bedside.  Transplant team at the bedside to evaluate mid abdominal surgical incision wounds.  Wound vac dressings on hold at this time per transplant team due to bleeding from the wounds (see PT wound note for details).  Complete pouching system changed:  Stoma 1 3/8" discolored tan/yellowish on the top and dark brown slough at the base circumferentially.  Transplant team at the bedside and aware of the findings.  No bleeding from stoma.  Stoma is functioning for bilious brown drainage, Peristomal skin and mucocutaneous junction intact.  Adherent brown slough  VS necrosis at mucocutaneous junction circumferentially.  Creases/skin indents at 3 and 9 o'clock. Dusted the peristomal skin with stoma powder excess removed.  Dab in with Cavilon 3M no sting barrier liquid film.  Re pouched w/ 2 1/4" flat skin barrier.  Bead of stoma paste applied to skin creases to level the surface and at the back of skin barrier near opening to caulk & high output pouch re- attached to bedside drainage bag. Supplies and pattern  left at the bedside.  Clinical RN Radha Vera at the bedside and discussed the plan of care with her.  Will continue to follow up.

## 2025-01-10 NOTE — PROGRESS NOTE ADULT - ASSESSMENT
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 Post-op course c/b worsening mental status and acute hypoxic respiratory failure requiring multiple intubations/extubations, currently extubated (as of 11/26/24) and colonic ileus s/p several rounds of Relistor and Neostigmine with NGT and rectal tube currently in place now with septic shock due to ischemic bowel s/p total colectomy with ostomy creation. Pt. now septic with blood culture positive for clostridium paraputrificum and s/p PLEX 3 session for hyperbilirubinemia.  Transplant nephrology consulted for metabolic acidosis and MORAIMA.    1. s/p OLT 11/15/24 with oliguric MORAIMA in setting of septic shock.  Likely hemodynamically mediated in setting of cardiogenic and septic shock on multiple vasopressors and prior IABP.   - CT AP non-con: Bilateral renal cysts including cysts with peripheral calcification in the left kidney.  - Initiated on CRRT 12/7/24- 12/15/24 at 1AM stopped. s/p IHD 12/18/24 however required levophed.   - Has been back on CRRT but stopped 1/7/25 due to catheter malfunction. Restarted 1/8/25 at 8PM with new catheter.   - Continue CRRT today. Pt. is off pressor support, with some mild hypotension. Can consider transition to iHD if pt. remains stable early next week.    - Monitor labs and I/Os. Avoid nephrotoxins including, ACE/ARB, NSAIDs, contrast, etc. Dose medications as per CRRT.     If you have any questions, please feel free to contact me:  Magaly Tello MD PGY-4  Nephrology Fellow  [employer] Teams (Preferred)/ Pager [phone]   (After 5pm or on weekends please page the on-call fellow)

## 2025-01-10 NOTE — PROGRESS NOTE ADULT - ASSESSMENT
74 male w/ a PMHx of HTN, DM, AF, BPH, MASH cirrhosis and HCC s/p OLT on 11/15. Case was uneventful but post-op course has been prolonged and c/b delirium, aspiration, multiple episodes of acute hypoxic respiratory failure requiring reintubation from 11/20-11/24 & 11/24-11/26, AF w/ RVR, ileus requiring NGT, distended colon requiring rectal tube, dysphagia, malnutrition, hypernatremia, fevers, pancytopenia, poorly controlled glucoses, and left brachial VTE. Patient went into severe refractory septic shock on 12/6 w/ blood cultures positive for E. coli s/p s/p ex lap, total abd colectomy, end ileostomy, 3 intentionally retained laparotomy pads for bleeding, Abthera, IABP placement w/ admission to CTICU, Patient required inotropes, Jacqui, and CRRT post-op. s/p closure 12/9. IABP removed 12/10 and transferred back to SICU 12/13. SICU course c/b narrow complex arrythmia w/ ECG showing new ST elevations concerning for posterior wall MI vs vasospasms, cards consulted and started on heparin gtt for empiric ACS tx which was c/b GIB then transitioned to argatroban c/b bleed. TTE revealed LVOT obstruction & TODD.     PLAN:  Neuro:  - Pain: PRN oxy solution  - Seroquel 50mg qHS  - Opens eyes intermittently, intermittently following commands, off sedation  - CT head 11/19, 11/21, 11/25, 11/29, 12/5 negative  - EEG: Mild diffuse cerebral dysfunction that is not specific in etiology. No epileptic discharges recorded. No seizures recorded.  - Holding home xanax, Abilify, Zoloft, Remeron, Lexapro  - melatonin added  - CT head 12/20 showing age-indeterminate infarct, f/u Neurology recs  - No need for MRI    Resp:  - S/p bedside tracheostomy 12/17  - trach collar daily, CPAP overnight  - c/w chest PT  - c/w inhaled bronchodilator  - c/w suctioning PRN    CV:  - off pressors  - 20mg midodrine q8  - home metoprolol 25mg q8  - IABP removed 12/10  - TTE 12/6 w/ LVOT obstruction & TODD  - TTE 12/11 shows EF of 55-60%    GI:  - trend LFTs  - NPO, NGT w/ TFs (held overnight)  - imodium for high ostomy output   - protonix BID  - monitor ALYSSA output    /Renal:  - CRRT net even  - Monitor BUN/Cr, I&Os, trend UOP  - Monitor scrotal necrosis, maintain nichols at all times  - Bladder scan q12    Heme:  - trend H/H, PLTs, coags  - hep gtt and argatroban gtt held i/s/o bleed  - PLEX x4, last session 1/9    ID:  - ABX: caspo, flagyl, imepenem  - transplant ppx w/ bactrim  - Ganciclovir for CMV   - immunosuppression w/ cyclosporine  - 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

## 2025-01-11 NOTE — PROGRESS NOTE ADULT - ATTENDING COMMENTS
74 yr M w liver failure, s/p OLT in nov 24 now w trach 12/17, bacteremia, return to OR, total colectomy, ileostomy creation, cardiogenic shock, oliguric renal failure requiring CRRT    ON: high NG output, hypotensive, resp distress, placed back on PRVC    deconditioned, sitting in chair, open eyes, unable to move extremeties now  seroquel qhs  PRVC 14/470/5/45% PIP 12 given resp distress and   portex 7 trach  AM CXR bibasilar pl effusions  persistent met acidosis gas, likely liver related  decreased metop 12.5 bid  kassandra @2  midodrine on hold for now  fluctuating lactate, likely liver dysfunction related  TTE 12/11 EF 55%  NPO  NG tube feeds on hold  imodium, ostomy: 200cc  LFTs worsening, plasmapheresis, IVIG intermittently  ursodiol  bulb 600cc output in total , wound vac  anuric, net balance slight post (due to high NG output)  phimosis, skin irritation of area, given this keep nichols   CRRT even  intermittent 25% albumin  hb stable, severe thrombocytopenia, requiring transfusions frequently, TEG w low FF  no chem VTE PPX  CT h/c/a/p now  ID transplant following: caspo, flagyl, imipenem gancyclovir per ID transplant  combicath steno, bcx clostridium, e fecal from abd  afebrile overnight, chronic leukopenia likely from chronic   procal 2.26 (1/9)  glycemic control w ins nph 8 q6hr  steroid for immunosuppression  family at bedside   PT when able    rt IJ THC (1/8)

## 2025-01-11 NOTE — PROGRESS NOTE ADULT - SUBJECTIVE AND OBJECTIVE BOX
SUBJECTIVE: Patient is a 74 M s/p OLT 11/2024 c/b delirium, aspiration, multiple episodes of acute hypoxic respiratory failure requiring reintubation from 11/20-11/24 & 11/24-11/26, AF w/ RVR, ileus requiring NGT, distended colon requiring rectal tube, dysphagia, malnutrition, hypernatremia, fevers, pancytopenia, poorly controlled glucoses, and left brachial VTE. Patient went into severe refractory septic shock on 12/6 w/ blood cultures positive for E. coli s/p s/p ex lap, total abd colectomy, end ileostomy, 3 intentionally retained laparotomy pads for bleeding, Abthera, IABP placement w/ admission to CTICU, Patient required inotropes, Jacqui, and CRRT post-op. s/p closure 12/9. IABP removed 12/10 and transferred back to SICU 12/13. SICU course c/b narrow complex arrythmia w/ ECG showing new ST elevations concerning for posterior wall MI vs vasospasms, cards consulted and started on heparin gtt for empiric ACS tx which was c/b GIB then transitioned to argatroban c/b bleed. TTE revealed LVOT obstruction & TODD.     Colorectal surgery consulted intra-op for assistance during total abdominal colectomy. Reconsulted approx 1 week ago for high ileostomy output. At that time, SICU team had started lomotil and imodium which colorectal agreed with. Ileostomy output became within goal volume. Re-consulted this AM bc of acute change in ileostomy output. Significant decrease in output approx 100 cc in 24 hrs. Patient was having feces pooling at mouth and out of trach. NGT was placed to suction by SICU team with approx 1 L of immediate feces-like output. Ostomy was interrogated by transplant surgeon without evidence of distal obstruction at fascial level.     Patient currently with worsening hemodynamic status and on increasing vasopressor requirements.     Vital Signs Last 24 Hrs  T(C): 37.4 (11 Jan 2025 07:00), Max: 37.4 (11 Jan 2025 05:00)  T(F): 99.3 (11 Jan 2025 07:00), Max: 99.3 (11 Jan 2025 05:00)  HR: 104 (11 Jan 2025 13:15) (79 - 107)  BP: 93/55 (11 Jan 2025 13:15) (79/48 - 124/56)  BP(mean): 69 (11 Jan 2025 13:15) (58 - 89)  RR: 21 (11 Jan 2025 13:15) (20 - 41)  SpO2: 94% (11 Jan 2025 13:15) (89% - 99%)    Parameters below as of 11 Jan 2025 07:00  Patient On (Oxygen Delivery Method): ventilator        I&O's Detail    10 Giancarlo 2025 07:01  -  11 Jan 2025 07:00  --------------------------------------------------------  IN:    Albumin 25%  -  50 mL: 200 mL    Albumin 5%  - 250 mL: 750 mL    Cryoprecipitate: 75 mL    Enteral Tube Flush: 430 mL    IV PiggyBack: 150 mL    IV PiggyBack: 1200 mL    Phenylephrine: 205.4 mL    Platelets - Single Donor: 225 mL    Vital1.5: 1170 mL  Total IN: 4405.4 mL    OUT:    Bulb (mL): 575 mL    Ileostomy (mL): 150 mL    Indwelling Catheter - Urethral (mL): 20 mL    Nasogastric/Oral tube (mL): 1000 mL    Other (mL): 2194 mL    VAC (Vacuum Assisted Closure) System (mL): 200 mL  Total OUT: 4139 mL    Total NET: 266.4 mL      11 Jan 2025 07:01  -  11 Jan 2025 13:48  --------------------------------------------------------  IN:    Enteral Tube Flush: 30 mL    IV PiggyBack: 200 mL    Phenylephrine: 309.8 mL    Platelets - Single Donor: 225 mL    PRBCs (Packed Red Blood Cells): 300 mL  Total IN: 1064.8 mL    OUT:    Ileostomy (mL): 50 mL    Indwelling Catheter - Urethral (mL): 0 mL    Nasogastric/Oral tube (mL): 600 mL    Other (mL): 102 mL  Total OUT: 752 mL    Total NET: 312.8 mL      Physical Exam:  General Appearance: ill-appearing, jaundice  Respiratory: trach, ventilated full support  Abdomen: distended, ileostomy edematous without any significant output, midline incision with packing, ALYSSA x 1 w s/s output    LABS:                        8.1    1.60  )-----------( 28       ( 11 Jan 2025 11:32 )             24.1     01-11    138  |  102  |  29[H]  ----------------------------<  88  4.0   |  19[L]  |  <0.30[L]    Ca    8.5      11 Jan 2025 11:32  Phos  3.4     01-11  Mg     2.5     01-11    TPro  4.4[L]  /  Alb  3.6  /  TBili  26.7[H]  /  DBili  x   /  AST  58[H]  /  ALT  33  /  AlkPhos  78  01-11    PT/INR - ( 11 Jan 2025 06:01 )   PT: 30.9 sec;   INR: 2.74 ratio         PTT - ( 11 Jan 2025 06:01 )  PTT:60.2 sec  Urinalysis Basic - ( 11 Jan 2025 11:32 )    Color: x / Appearance: x / SG: x / pH: x  Gluc: 88 mg/dL / Ketone: x  / Bili: x / Urobili: x   Blood: x / Protein: x / Nitrite: x   Leuk Esterase: x / RBC: x / WBC x   Sq Epi: x / Non Sq Epi: x / Bacteria: x        RADIOLOGY & ADDITIONAL STUDIES:

## 2025-01-11 NOTE — PROGRESS NOTE ADULT - SUBJECTIVE AND OBJECTIVE BOX
INFECTIOUS DISEASES FOLLOW UP-- Renée Carrero  [phone]    This is a follow up note for this  74yMale with  Status post liver transplantation    clinical deterioration requiring pressor  no ostomy output    pressure dressing over abdomen-wound vac removed        ROS:  CONSTITUTIONAL:  Non responsive trach/vent      Allergies    No Known Allergies    Intolerances        ANTIBIOTICS/RELEVANT:  antimicrobials  caspofungin IVPB 50 milliGRAM(s) IV Intermittent every 24 hours  caspofungin IVPB      ganciclovir IVPB 250 milliGRAM(s) IV Intermittent every 12 hours  imipenem/cilastatin  IVPB 500 milliGRAM(s) IV Intermittent every 6 hours  trimethoprim  40 mG/sulfamethoxazole 200 mG Suspension 80 milliGRAM(s) Enteral Tube daily    immunologic:  cycloSPORINE  , modified (GENGRAF) Solution 75 milliGRAM(s) Oral <User Schedule>  cycloSPORINE  , modified (GENGRAF) Solution 50 milliGRAM(s) Oral <User Schedule>  epoetin tonja (PROCRIT) Injectable 36336 Unit(s) IV Push every 7 days    OTHER:  albumin human 25% IVPB 100 milliLiter(s) IV Intermittent every 6 hours  albuterol/ipratropium for Nebulization 3 milliLiter(s) Nebulizer every 6 hours PRN  buDESOnide    Inhalation Suspension 0.5 milliGRAM(s) Inhalation every 12 hours  chlorhexidine 0.12% Liquid 15 milliLiter(s) Oral Mucosa every 12 hours  chlorhexidine 2% Cloths 1 Application(s) Topical <User Schedule>  collagenase Ointment 1 Application(s) Topical daily  CRRT Treatment    <Continuous>  FIRST- Mouthwash  BLM 10 milliLiter(s) Swish and Spit every 8 hours PRN  insulin lispro (ADMELOG) corrective regimen sliding scale   SubCutaneous every 6 hours  melatonin 5 milliGRAM(s) Oral at bedtime  methylPREDNISolone sodium succinate Injectable 32 milliGRAM(s) IV Push <User Schedule>  metoprolol tartrate 12.5 milliGRAM(s) Oral every 12 hours  mupirocin 2% Ointment 1 Application(s) Topical every 12 hours  nystatin Powder 1 Application(s) Topical every 12 hours  oxyCODONE    Solution 5 milliGRAM(s) Oral every 4 hours PRN  oxyCODONE    Solution 2.5 milliGRAM(s) Oral every 4 hours PRN  pantoprazole  Injectable 40 milliGRAM(s) IV Push every 12 hours  phenylephrine    Infusion 0.2 MICROgram(s)/kG/Min IV Continuous <Continuous>  Phoxillum Filtration BK 4 / 2.5 5000 milliLiter(s) CRRT <Continuous>  PrismaSATE Dialysate BGK 4 / 2.5 5000 milliLiter(s) CRRT <Continuous>  PrismaSOL Filtration BGK 0 / 2.5 5000 milliLiter(s) CRRT <Continuous>  QUEtiapine 50 milliGRAM(s) Oral at bedtime  ursodiol Suspension 300 milliGRAM(s) Oral every 12 hours  vasopressin Infusion 0.03 Unit(s)/Min IV Continuous <Continuous>      Objective:  Vital Signs Last 24 Hrs  T(C): 37.3 (11 Jan 2025 15:00), Max: 37.4 (11 Jan 2025 05:00)  T(F): 99.1 (11 Jan 2025 15:00), Max: 99.3 (11 Jan 2025 05:00)  HR: 101 (11 Jan 2025 18:15) (79 - 107)  BP: 113/63 (11 Jan 2025 16:00) (79/48 - 124/56)  BP(mean): 82 (11 Jan 2025 16:00) (58 - 89)  RR: 17 (11 Jan 2025 18:15) (16 - 41)  SpO2: 96% (11 Jan 2025 18:15) (89% - 99%)    Parameters below as of 11 Jan 2025 07:00  Patient On (Oxygen Delivery Method): ventilator        PHYSICAL EXAM:  Constitutional:no acute distress  Eyes:DUSTIN, EOMI  Ear/Nose/Throat: no oral lesions, 	  Respiratory: clear BL  Cardiovascular: S1S2  Gastrointestinal:soft, (+) BS, no tenderness  Extremities:no e/e/c  No Lymphadenopathy  IV sites not inflammed.    LABS:                        7.9    2.30  )-----------( 38       ( 11 Jan 2025 18:10 )             22.8     01-11    138  |  102  |  29[H]  ----------------------------<  88  4.0   |  19[L]  |  <0.30[L]    Ca    8.5      11 Jan 2025 11:32  Phos  3.4     01-11  Mg     2.5     01-11    TPro  4.4[L]  /  Alb  3.6  /  TBili  26.7[H]  /  DBili  x   /  AST  58[H]  /  ALT  33  /  AlkPhos  78  01-11    PT/INR - ( 11 Jan 2025 06:01 )   PT: 30.9 sec;   INR: 2.74 ratio         PTT - ( 11 Jan 2025 06:01 )  PTT:60.2 sec  Urinalysis Basic - ( 11 Jan 2025 11:32 )    Color: x / Appearance: x / SG: x / pH: x  Gluc: 88 mg/dL / Ketone: x  / Bili: x / Urobili: x   Blood: x / Protein: x / Nitrite: x   Leuk Esterase: x / RBC: x / WBC x   Sq Epi: x / Non Sq Epi: x / Bacteria: x        MICROBIOLOGY:    Culture - Blood (12.28.24 @ 18:10)    -  Blood PCR Panel: NEG   Gram Stain:   Growth in anaerobic bottle: Gram Variable Rods   Specimen Source: .Blood BLOOD   Organism: Blood Culture PCR   Culture Results:   Growth in anaerobic bottle: Clostridium paraputrificum "Susceptibilities  not performed"  Direct identification is available within approximately 3-5  hours either by Blood Panel Multiplexed PCR or Direct  MALDI-TOF. Details: [email or web address]   Organism Identification: Blood Culture PCR   Method Type: PCR            RECENT CULTURES:  01-05 @ 05:00  .Blood BLOOD  --  --  --    No growth at 5 days  --  01-05 @ 02:20  .Blood BLOOD  --  --  --    No growth at 5 days  --      RADIOLOGY & ADDITIONAL STUDIES:    < from: CT Chest w/ IV Cont (01.11.25 @ 12:28) >  IMPRESSION:  New cavitary left apical lesions concerning for atypical infection,   likely fungal.    Bilateral pleural effusions with complete right lower lobe and near   complete left lower lobe atelectasis.    Fluid-filled mildly distended left abdominal small bowel loops are again   noted which may represent enteritis. Questionable focal pneumatosis   versus trapped bubbles of intraluminal air in a loop of small bowel.   Lactate level correlation is recommended.    Mild to moderate ascites and diffuse anasarca.    < end of copied text >

## 2025-01-11 NOTE — PROGRESS NOTE ADULT - SUBJECTIVE AND OBJECTIVE BOX
Transplant Surgery - Multidisciplinary Rounds  --------------------------------------------------------------  OLT   11/15/2024         Colectomy 12/7/2024     IABP 12/7 removed 12/10  Tracheostomy 12/17/2024    Present:   Patient seen and examined with multidisciplinary Transplant team including Surgeon: Dr. Dagher, Hepatologist Dr. Luis, JAZZ Velasquez and bedside RN in AM rounds.   Disciplines not in attendance will be notified of the plan.     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   s/p OLT 11/15 with post op course c/b:  ·	Hypoxia: Reintubated 11/20, extubated 11/24->reintubated 11/24, extubated 11/26, reintubated 12/7, trached 12/17  ·	Ileus   ·	A fib  ·	AMS/Seizure   ·	L brachial DVT  ·	Neutropenia  ·	E coli Bacteremia (12/6)  ·	s/p Colectomy 12/7.   ·	IABP 12/7, removed 12/10  ·	Ec Faecalis UTI (12/16)  ·	Stenotrophamonas in trach aspirate (12/19)  ·	Clostridium in blood 12/23  ·	UGIB 12/26  ·	CMV Viremia  ·	MORAIMA requiring CRRT  ·	Shock liver    Interval/Overnight Events:   - afebrile, hypotensive back on vasopressors  - 1uPlatelets, 1 uCryo  - CRRT net even  - Ostomy no longer having output, digitized and d/c antimotility agents      Immunosuppression:  - Cyclo per level, MMF HELD, Solu 32  -ongoing monitoring for signs of rejection        MEDICATIONS  (STANDING):  albumin human 25% IVPB 100 milliLiter(s) IV Intermittent every 6 hours  buDESOnide    Inhalation Suspension 0.5 milliGRAM(s) Inhalation every 12 hours  caspofungin IVPB 50 milliGRAM(s) IV Intermittent every 24 hours  caspofungin IVPB      chlorhexidine 0.12% Liquid 15 milliLiter(s) Oral Mucosa every 12 hours  chlorhexidine 2% Cloths 1 Application(s) Topical <User Schedule>  collagenase Ointment 1 Application(s) Topical daily  CRRT Treatment    <Continuous>  cycloSPORINE  , modified (GENGRAF) Solution 75 milliGRAM(s) Oral <User Schedule>  cycloSPORINE  , modified (GENGRAF) Solution 50 milliGRAM(s) Oral <User Schedule>  epoetin tonja (PROCRIT) Injectable 98716 Unit(s) IV Push every 7 days  ganciclovir IVPB 250 milliGRAM(s) IV Intermittent every 12 hours  imipenem/cilastatin  IVPB 500 milliGRAM(s) IV Intermittent every 6 hours  insulin lispro (ADMELOG) corrective regimen sliding scale   SubCutaneous every 6 hours  insulin NPH human recombinant 4 Unit(s) SubCutaneous every 6 hours  melatonin 5 milliGRAM(s) Oral at bedtime  methylPREDNISolone sodium succinate Injectable 32 milliGRAM(s) IV Push <User Schedule>  metoprolol tartrate 12.5 milliGRAM(s) Oral every 12 hours  mupirocin 2% Ointment 1 Application(s) Topical every 12 hours  nystatin Powder 1 Application(s) Topical every 12 hours  pantoprazole  Injectable 40 milliGRAM(s) IV Push every 12 hours  phenylephrine    Infusion 0.2 MICROgram(s)/kG/Min (7.46 mL/Hr) IV Continuous <Continuous>  Phoxillum Filtration BK 4 / 2.5 5000 milliLiter(s) (1200 mL/Hr) CRRT <Continuous>  PrismaSATE Dialysate BGK 4 / 2.5 5000 milliLiter(s) (1500 mL/Hr) CRRT <Continuous>  PrismaSOL Filtration BGK 0 / 2.5 5000 milliLiter(s) (200 mL/Hr) CRRT <Continuous>  QUEtiapine 50 milliGRAM(s) Oral at bedtime  trimethoprim  40 mG/sulfamethoxazole 200 mG Suspension 80 milliGRAM(s) Enteral Tube daily  ursodiol Suspension 300 milliGRAM(s) Oral every 12 hours    MEDICATIONS  (PRN):  albuterol/ipratropium for Nebulization 3 milliLiter(s) Nebulizer every 6 hours PRN Shortness of Breath and/or Wheezing  FIRST- Mouthwash  BLM 10 milliLiter(s) Swish and Spit every 8 hours PRN Mouth Care  oxyCODONE    Solution 5 milliGRAM(s) Oral every 4 hours PRN Severe Pain (7 - 10)  oxyCODONE    Solution 2.5 milliGRAM(s) Oral every 4 hours PRN Moderate Pain (4 - 6)      PAST MEDICAL & SURGICAL HISTORY:  Diabetes      Transaminitis      Paroxysmal atrial fibrillation      Depression      BPH (benign prostatic hyperplasia)      Hypertension      Chronic atrial fibrillation      Coronary artery disease      Hepatocellular carcinoma      DM (diabetes mellitus)      HTN (hypertension)      Paroxysmal atrial fibrillation      Cirrhosis      HCC (hepatocellular carcinoma)      History of BPH      History of laparoscopic cholecystectomy      History of lumbar laminectomy      H/O prior ablation treatment      H/O percutaneous left heart catheterization          Vital Signs Last 24 Hrs  T(C): 37.4 (11 Jan 2025 07:00), Max: 37.4 (11 Jan 2025 05:00)  T(F): 99.3 (11 Jan 2025 07:00), Max: 99.3 (11 Jan 2025 05:00)  HR: 102 (11 Jan 2025 11:00) (79 - 105)  BP: 92/52 (11 Jan 2025 11:00) (79/48 - 124/56)  BP(mean): 67 (11 Jan 2025 11:00) (58 - 89)  RR: 20 (11 Jan 2025 11:00) (20 - 41)  SpO2: 94% (11 Jan 2025 11:00) (89% - 99%)    Parameters below as of 11 Jan 2025 07:00  Patient On (Oxygen Delivery Method): ventilator        I&O's Summary    10 Giancarlo 2025 07:01  -  11 Jan 2025 07:00  --------------------------------------------------------  IN: 4405.4 mL / OUT: 4139 mL / NET: 266.4 mL    11 Jan 2025 07:01  -  11 Jan 2025 12:04  --------------------------------------------------------  IN: 1064.8 mL / OUT: 752 mL / NET: 312.8 mL                              8.1    1.60  )-----------( 28       ( 11 Jan 2025 11:32 )             24.1     01-11    139  |  102  |  28[H]  ----------------------------<  115[H]  4.4   |  19[L]  |  <0.30[L]    Ca    8.6      11 Jan 2025 06:01  Phos  3.3     01-11  Mg     2.5     01-11    TPro  4.7[L]  /  Alb  3.9  /  TBili  27.4[H]  /  DBili  x   /  AST  53[H]  /  ALT  31  /  AlkPhos  78  01-11          Culture - Blood (collected 01-05-25 @ 05:00)  Source: .Blood BLOOD  Final Report (01-10-25 @ 09:01):    No growth at 5 days    Culture - Blood (collected 01-05-25 @ 02:20)  Source: .Blood BLOOD  Final Report (01-10-25 @ 09:01):    No growth at 5 days              ROS: Unable to assess patient is lethargic, s/p tracheostomy    PHYSICAL EXAM:   Constitutional: trach to vent, awake, responsive only to pain  Eyes:  PERRLA, Scleral icterus  ENMT: nc/at, no thrush, NGT  Neck: supple, trach   Respiratory: CTA B/L  Cardiovascular: RRR  Gastrointestinal: incision clean/dry/intact + Ostomy dusky low/no output, wound vac, peritoneal drains x1 bilious   Genitourinary: +scrotal edema w/ large fluid collection; black/green discoloration  Extremities: SCD's in place and working bilaterally, + LE edema  Neurological: trach to vent, opens eyes to voice   Skin: large sacral decub, poor healing with breakdown Transplant Surgery - Multidisciplinary Rounds  --------------------------------------------------------------  OLT   11/15/2024         Colectomy 12/7/2024     IABP 12/7 removed 12/10  Tracheostomy 12/17/2024    Present:   Patient seen and examined with multidisciplinary Transplant team including Surgeon: Dr. Dagher, Hepatologist Dr. White, JAZZ Colby/Ale and bedside RN in AM rounds.   Disciplines not in attendance will be notified of the plan.     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   s/p OLT 11/15 with post op course c/b:  ·	Hypoxia: Reintubated 11/20, extubated 11/24->reintubated 11/24, extubated 11/26, reintubated 12/7, trached 12/17  ·	Ileus   ·	A fib  ·	AMS/Seizure   ·	L brachial DVT  ·	Neutropenia  ·	E coli Bacteremia (12/6)  ·	s/p Colectomy 12/7.   ·	IABP 12/7, removed 12/10  ·	Ec Faecalis UTI (12/16)  ·	Stenotrophamonas in trach aspirate (12/19)  ·	Clostridium in blood 12/23  ·	UGIB 12/26  ·	CMV Viremia  ·	MORAIMA requiring CRRT  ·	Shock liver    Interval/Overnight Events:   - afebrile, hypotensive back on vasopressors  - 1uPlatelets, 1 uCryo  - CRRT net even  - Ostomy no longer having output, digitized and d/c antimotility agents      Immunosuppression:  - Cyclo per level, MMF HELD, Solu 32  -ongoing monitoring for signs of rejection        MEDICATIONS  (STANDING):  albumin human 25% IVPB 100 milliLiter(s) IV Intermittent every 6 hours  buDESOnide    Inhalation Suspension 0.5 milliGRAM(s) Inhalation every 12 hours  caspofungin IVPB 50 milliGRAM(s) IV Intermittent every 24 hours  caspofungin IVPB      chlorhexidine 0.12% Liquid 15 milliLiter(s) Oral Mucosa every 12 hours  chlorhexidine 2% Cloths 1 Application(s) Topical <User Schedule>  collagenase Ointment 1 Application(s) Topical daily  CRRT Treatment    <Continuous>  cycloSPORINE  , modified (GENGRAF) Solution 75 milliGRAM(s) Oral <User Schedule>  cycloSPORINE  , modified (GENGRAF) Solution 50 milliGRAM(s) Oral <User Schedule>  epoetin tonja (PROCRIT) Injectable 42069 Unit(s) IV Push every 7 days  ganciclovir IVPB 250 milliGRAM(s) IV Intermittent every 12 hours  imipenem/cilastatin  IVPB 500 milliGRAM(s) IV Intermittent every 6 hours  insulin lispro (ADMELOG) corrective regimen sliding scale   SubCutaneous every 6 hours  insulin NPH human recombinant 4 Unit(s) SubCutaneous every 6 hours  melatonin 5 milliGRAM(s) Oral at bedtime  methylPREDNISolone sodium succinate Injectable 32 milliGRAM(s) IV Push <User Schedule>  metoprolol tartrate 12.5 milliGRAM(s) Oral every 12 hours  mupirocin 2% Ointment 1 Application(s) Topical every 12 hours  nystatin Powder 1 Application(s) Topical every 12 hours  pantoprazole  Injectable 40 milliGRAM(s) IV Push every 12 hours  phenylephrine    Infusion 0.2 MICROgram(s)/kG/Min (7.46 mL/Hr) IV Continuous <Continuous>  Phoxillum Filtration BK 4 / 2.5 5000 milliLiter(s) (1200 mL/Hr) CRRT <Continuous>  PrismaSATE Dialysate BGK 4 / 2.5 5000 milliLiter(s) (1500 mL/Hr) CRRT <Continuous>  PrismaSOL Filtration BGK 0 / 2.5 5000 milliLiter(s) (200 mL/Hr) CRRT <Continuous>  QUEtiapine 50 milliGRAM(s) Oral at bedtime  trimethoprim  40 mG/sulfamethoxazole 200 mG Suspension 80 milliGRAM(s) Enteral Tube daily  ursodiol Suspension 300 milliGRAM(s) Oral every 12 hours    MEDICATIONS  (PRN):  albuterol/ipratropium for Nebulization 3 milliLiter(s) Nebulizer every 6 hours PRN Shortness of Breath and/or Wheezing  FIRST- Mouthwash  BLM 10 milliLiter(s) Swish and Spit every 8 hours PRN Mouth Care  oxyCODONE    Solution 5 milliGRAM(s) Oral every 4 hours PRN Severe Pain (7 - 10)  oxyCODONE    Solution 2.5 milliGRAM(s) Oral every 4 hours PRN Moderate Pain (4 - 6)      PAST MEDICAL & SURGICAL HISTORY:  Diabetes      Transaminitis      Paroxysmal atrial fibrillation      Depression      BPH (benign prostatic hyperplasia)      Hypertension      Chronic atrial fibrillation      Coronary artery disease      Hepatocellular carcinoma      DM (diabetes mellitus)      HTN (hypertension)      Paroxysmal atrial fibrillation      Cirrhosis      HCC (hepatocellular carcinoma)      History of BPH      History of laparoscopic cholecystectomy      History of lumbar laminectomy      H/O prior ablation treatment      H/O percutaneous left heart catheterization          Vital Signs Last 24 Hrs  T(C): 37.4 (11 Jan 2025 07:00), Max: 37.4 (11 Jan 2025 05:00)  T(F): 99.3 (11 Jan 2025 07:00), Max: 99.3 (11 Jan 2025 05:00)  HR: 102 (11 Jan 2025 11:00) (79 - 105)  BP: 92/52 (11 Jan 2025 11:00) (79/48 - 124/56)  BP(mean): 67 (11 Jan 2025 11:00) (58 - 89)  RR: 20 (11 Jan 2025 11:00) (20 - 41)  SpO2: 94% (11 Jan 2025 11:00) (89% - 99%)    Parameters below as of 11 Jan 2025 07:00  Patient On (Oxygen Delivery Method): ventilator        I&O's Summary    10 Giancarlo 2025 07:01  -  11 Jan 2025 07:00  --------------------------------------------------------  IN: 4405.4 mL / OUT: 4139 mL / NET: 266.4 mL    11 Jan 2025 07:01  -  11 Jan 2025 12:04  --------------------------------------------------------  IN: 1064.8 mL / OUT: 752 mL / NET: 312.8 mL                              8.1    1.60  )-----------( 28       ( 11 Jan 2025 11:32 )             24.1     01-11    139  |  102  |  28[H]  ----------------------------<  115[H]  4.4   |  19[L]  |  <0.30[L]    Ca    8.6      11 Jan 2025 06:01  Phos  3.3     01-11  Mg     2.5     01-11    TPro  4.7[L]  /  Alb  3.9  /  TBili  27.4[H]  /  DBili  x   /  AST  53[H]  /  ALT  31  /  AlkPhos  78  01-11          Culture - Blood (collected 01-05-25 @ 05:00)  Source: .Blood BLOOD  Final Report (01-10-25 @ 09:01):    No growth at 5 days    Culture - Blood (collected 01-05-25 @ 02:20)  Source: .Blood BLOOD  Final Report (01-10-25 @ 09:01):    No growth at 5 days              ROS: Unable to assess patient is lethargic, s/p tracheostomy    PHYSICAL EXAM:   Constitutional: trach to vent, awake, responsive only to pain  Eyes:  PERRLA, Scleral icterus  ENMT: nc/at, no thrush, NGT  Neck: supple, trach   Respiratory: CTA B/L  Cardiovascular: RRR  Gastrointestinal: incision clean/dry/intact + Ostomy dusky low/no output, wound vac, peritoneal drains x1 bilious   Genitourinary: +scrotal edema w/ large fluid collection; black/green discoloration  Extremities: SCD's in place and working bilaterally, + LE edema  Neurological: trach to vent, opens eyes to voice   Skin: large sacral decub, poor healing with breakdown

## 2025-01-11 NOTE — PROGRESS NOTE ADULT - ASSESSMENT
PLAN:  Neuro:  - Pain: PRN oxy solution  - Seroquel 50mg qHS  - Opens eyes intermittently, intermittently following commands, off sedation  - CT head 11/19, 11/21, 11/25, 11/29, 12/5 negative  - EEG: Mild diffuse cerebral dysfunction that is not specific in etiology. No epileptic discharges recorded. No seizures recorded.  - Holding home xanax, Abilify, Zoloft, Remeron, Lexapro  - melatonin added  - CT head 12/20 showing age-indeterminate infarct, f/u Neurology recs  - No need for MRI    Resp:  - S/p bedside tracheostomy 12/17  - trach collar daily, CPAP overnight  - c/w chest PT  - c/w inhaled bronchodilator  - c/w suctioning PRN    CV:  - off pressors  - 20mg midodrine q8  - home metoprolol 25mg q8  - IABP removed 12/10  - TTE 12/6 w/ LVOT obstruction & TODD  - TTE 12/11 shows EF of 55-60%    GI:  - trend LFTs  - NPO, NGT w/ TFs (held overnight)  - imodium for high ostomy output   - protonix BID  - monitor ALYSSA output    /Renal:  - CRRT net even  - Monitor BUN/Cr, I&Os, trend UOP  - Monitor scrotal necrosis, maintain nichols at all times  - Bladder scan q12    Heme:  - trend H/H, PLTs, coags  - hep gtt and argatroban gtt held i/s/o bleed  - PLEX x4, last session 1/9    ID:  - ABX: caspo, flagyl, imepenem  - transplant ppx w/ bactrim  - Ganciclovir for CMV   - immunosuppression w/ cyclosporine  - 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 No 74 male w/ a PMHx of HTN, DM, AF, BPH, MASH cirrhosis and HCC s/p OLT on 11/15. Case was uneventful but post-op course has been prolonged and c/b delirium, aspiration, multiple episodes of acute hypoxic respiratory failure requiring reintubation from 11/20-11/24 & 11/24-11/26, AF w/ RVR, ileus requiring NGT, distended colon requiring rectal tube, dysphagia, malnutrition, hypernatremia, fevers, pancytopenia, poorly controlled glucoses, and left brachial VTE. Patient went into severe refractory septic shock on 12/6 w/ blood cultures positive for E. coli s/p s/p ex lap, total abd colectomy, end ileostomy, 3 intentionally retained laparotomy pads for bleeding, Abthera, IABP placement w/ admission to CTICU, Patient required inotropes, Jacqui, and CRRT post-op. s/p closure 12/9. IABP removed 12/10 and transferred back to SICU 12/13. SICU course c/b narrow complex arrythmia w/ ECG showing new ST elevations concerning for posterior wall MI vs vasospasms, cards consulted and started on heparin gtt for empiric ACS tx which was c/b GIB then transitioned to argatroban c/b bleed. TTE revealed LVOT obstruction & TODD.     PLAN:  Neuro:  - Pain: PRN oxy solution  - Seroquel 50mg qHS  - Opens eyes intermittently, intermittently following commands, off sedation  - CT head 11/19, 11/21, 11/25, 11/29, 12/5 negative  - EEG: Mild diffuse cerebral dysfunction that is not specific in etiology. No epileptic discharges recorded. No seizures recorded.  - Holding home xanax, Abilify, Zoloft, Remeron, Lexapro  - melatonin added  - CT head 12/20 showing age-indeterminate infarct, f/u Neurology recs  - No need for MRI    Resp:  - S/p bedside tracheostomy 12/17  - trach collar daily, CPAP overnight  - c/w chest PT  - c/w inhaled bronchodilator  - c/w suctioning PRN    CV:  - on kassandra  - 20mg midodrine q8  - home metoprolol 25mg q8  - IABP removed 12/10  - TTE 12/6 w/ LVOT obstruction & TODD  - TTE 12/11 shows EF of 55-60%    GI:  - trend LFTs  - NPO, NGT w/ TFs (held overnight)  - held imodium overnight  - protonix BID  - monitor ALYSSA output    /Renal:  - started 100cc 25% albumin q6 for 2 days  - CRRT net even  - Monitor BUN/Cr, I&Os, trend UOP  - Monitor scrotal necrosis, maintain nichols at all times  - Bladder scan q12    Heme:  - trend H/H, PLTs, coags  - hep gtt and argatroban gtt held i/s/o bleed  - PLEX x4, last session 1/9    ID:  - ABX: caspo, imepenem. d/c flagyl per ID  - transplant ppx w/ bactrim  - Ganciclovir for CMV   - immunosuppression w/ cyclosporine  - 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

## 2025-01-11 NOTE — PROGRESS NOTE ADULT - ASSESSMENT
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 Post op course complicated by acute hypoxic respiratory failure requiring intubation multiple times, most recently on 12/7 with conversion to tracheostomy on 12/17, e.coli bacteremia with RTOR on 12/7 for concern for worsening septic shock and cardiogenic shock s/p balloon pump placement and total abdominal colectomy in setting of ischemic bowel s/p OR on 12/9 for ileostomy creation, and olirugirc MORAIMA requiring CRRT and now intermittent HD.    Diagnosed with pneumonia secondary to Stenotrophomonas    Also with growth of Enterococcus faecalis from abdominal drains and urine culture    More fever and shock event on 12/29/24 likely 2/2 GIB    UA (12/19) 2 WBC  BCx (12/23) Clostridium paraputrificum  Bronch Cx (12/23) NGTD    CT Chest (12/23) Bibasilar groundglass and tree in bud opacities which may represent distal airway impaction versus pneumonia.    CT A/P (12/23) Peripheral wedge-shaped areas of hypoattenuation involving the superior aspect of the spleen, suggestive of splenic infarcts (12-59). Mildly dilated loops of proximal small bowel without transition point, likely ileus.    Testicular US (12/23) No appreciable arterial flow with very limited venous flow in in the testes bilaterally. Interval decrease in diffuse scrotal edema. Small bilateral hydroceles.    CT Pelvis (12/25) Moderate diffuse subcutaneous/scrotal edema without defined collection or subcutaneous air.    BCX (12/28): Gram variable rods (Blood PCR neg)    Antibiotic Course:  Meropenem ( 11/22-11/29, 11/22-11/29, 12/16-)   Minocycline (12/21-1/1)  Bactrim (11/16-11/24, 12/8-12/11, 12/21-)  Fluconazole (11/16-12/2, 12/12-12/16)   Caspofungin ( 12/6-12/11, 12/17-)  Cefepime (12/10-12/16)   Metronidazole (12/10-12/14)   Ganciclovir (12/7-12/13)   Linezolid (11/23-11/26, 12/6-12/11, 12/28-12/29)   Atovaquone (11/29-12/6)   Zosyn (11/20-11/22,12/6)   Tobramycin (12/6)   Valganciclovir (11/6-12/4)    #Positive Blood Culture (12/23 Clostridium paraputrificum) (12/28: Gram variable rods -Blood PCR neg)  Clostridium paraputrificum is generally penicillin and metronidazole susceptible      #Leukocytosis, Fever, Shock, Transaminitis,   #Stenotrophomonas tracheo/Pneumonia? s/p minocycline course  # polymicrobial bacteremia E faecalis; Clostridium spp in c/o Gi pathology but also necrotic scrotum     # requiring pressors again, no ostomy output, new cavitary chest lesion seen on ct  sent blood cultures x2 sets  new A-line placed  Send fungitell  send galactomanan  add voriconazole 200mg iV q 12hr      -Decrease immunosuppression       # elevated BiT, persists- s/p trial of plasmapheresis  Multifactorial, contribution of medication? penems?    #Liver Transplant Recipient, Prophylactic Antibiotic  --Repeat CMV detectable on 12/31  --Given thrombocytopenia can consider holding Bactrim or now  Cytomegalovirus By PCR: Det <34.5 IU/mL (12.23.24 @ 06:15)  Cytomegalovirus By PCR: 537 IU/mL (12.31.24 @ 13:36)  --restarted  ganciclovir IV adjusted for CVVH dosing- but at 2.5 mg/kg daily- now CMV PCR 1200--> would increase ganciclovir to 2.5 mg/kg q12h  check CMV PCR next monday      Thank you for involving us in the care of this patient  Transplant ID will continue to follow- Dr Carrero will bee available over the weekend for questions  Please call or page with additional questions  Pager; #[phone]  Teams: from 8 am to 5 pm  Rachele Lewis MD     74 year old male with PMH of DM, HTN, pAfib s/p ablation 2018 (no AC 2/2 thrombocytopenia), CAD, depression, anxiety, BPH, 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 Post op course complicated by acute hypoxic respiratory failure requiring intubation multiple times, most recently on 12/7 with conversion to tracheostomy on 12/17, e.coli bacteremia with RTOR on 12/7 for concern for worsening septic shock and cardiogenic shock s/p balloon pump placement and total abdominal colectomy in setting of ischemic bowel s/p OR on 12/9 for ileostomy creation, and olirugirc MORAIMA requiring CRRT and now intermittent HD.    Diagnosed with pneumonia secondary to Stenotrophomonas    Also with growth of Enterococcus faecalis from abdominal drains and urine culture    More fever and shock event on 12/29/24 likely 2/2 GIB    UA (12/19) 2 WBC  BCx (12/23) Clostridium paraputrificum  Bronch Cx (12/23) NGTD    CT Chest (12/23) Bibasilar groundglass and tree in bud opacities which may represent distal airway impaction versus pneumonia.    CT A/P (12/23) Peripheral wedge-shaped areas of hypoattenuation involving the superior aspect of the spleen, suggestive of splenic infarcts (12-59). Mildly dilated loops of proximal small bowel without transition point, likely ileus.    Testicular US (12/23) No appreciable arterial flow with very limited venous flow in in the testes bilaterally. Interval decrease in diffuse scrotal edema. Small bilateral hydroceles.    CT Pelvis (12/25) Moderate diffuse subcutaneous/scrotal edema without defined collection or subcutaneous air.    BCX (12/28): Gram variable rods (Blood PCR neg)    Antibiotic Course:  Meropenem ( 11/22-11/29, 11/22-11/29, 12/16-)   Minocycline (12/21-1/1)  Bactrim (11/16-11/24, 12/8-12/11, 12/21-)  Fluconazole (11/16-12/2, 12/12-12/16)   Caspofungin ( 12/6-12/11, 12/17-)  Cefepime (12/10-12/16)   Metronidazole (12/10-12/14)   Ganciclovir (12/7-12/13)   Linezolid (11/23-11/26, 12/6-12/11, 12/28-12/29)   Atovaquone (11/29-12/6)   Zosyn (11/20-11/22,12/6)   Tobramycin (12/6)   Valganciclovir (11/6-12/4)    #Positive Blood Culture (12/23 Clostridium paraputrificum) (12/28: Gram variable rods -Blood PCR neg)  Clostridium paraputrificum is generally penicillin and metronidazole susceptible      #Leukocytosis, Fever, Shock, Transaminitis,   #Stenotrophomonas tracheo/Pneumonia? s/p minocycline course  # polymicrobial bacteremia E faecalis; Clostridium spp in c/o Gi pathology but also necrotic scrotum     # requiring pressors again, no ostomy output, new cavitary chest lesion seen on ct  sent blood cultures x2 sets  new A-line placed  Send fungitell  send galactomanan  add voriconazole 200mg iV q 12hr      -Decrease immunosuppression       # elevated BiT, persists- s/p trial of plasmapheresis  Multifactorial, contribution of medication? penems?    #Liver Transplant Recipient, Prophylactic Antibiotic  --Repeat CMV detectable on 12/31  --Given thrombocytopenia can consider holding Bactrim or now  Cytomegalovirus By PCR: Det <34.5 IU/mL (12.23.24 @ 06:15)  Cytomegalovirus By PCR: 537 IU/mL (12.31.24 @ 13:36)  --restarted  ganciclovir IV adjusted for CVVH dosing-  check CMV PCR next monday 1/13/25      Discussed with SICU and transplant teams    Wes Carrero MD  Can be called via Teams  After 5pm/weekends [phone]

## 2025-01-11 NOTE — PROGRESS NOTE ADULT - SUBJECTIVE AND OBJECTIVE BOX
24 HOUR EVENTS:  - 1x PLT for Plt 17, maintain over 20  - restart tube feeds given improved abdominal distension  - Vitamin K 10mg x1  - TEG  - 1 cryo  - 750 albumin  - started kassandra for pressure support  - switched metoprolol from 50 tid to 12.5 bid  - held imodium  - d/c bannatrol  - started 100cc 25% albumin q6 for 2 days  - d/c flagyl per ID      NEURO  Exam:   Meds: melatonin 5 milliGRAM(s) Oral at bedtime  oxyCODONE    Solution 5 milliGRAM(s) Oral every 4 hours PRN Severe Pain (7 - 10)  oxyCODONE    Solution 2.5 milliGRAM(s) Oral every 4 hours PRN Moderate Pain (4 - 6)  QUEtiapine 50 milliGRAM(s) Oral at bedtime      RESPIRATORY  RR: 25 (01-11-25 @ 02:45) (18 - 41)  SpO2: 97% (01-11-25 @ 02:45) (92% - 97%)  Wt(kg): --  Exam: Lungs CTA b/l  Mechanical Ventilation: Mode: CPAP with PS, RR (patient): 22, FiO2: 30, PEEP: 5, PS: 5, MAP: 9.4, PIP: 16    Meds: albuterol/ipratropium for Nebulization 3 milliLiter(s) Nebulizer every 6 hours PRN Shortness of Breath and/or Wheezing  buDESOnide    Inhalation Suspension 0.5 milliGRAM(s) Inhalation every 12 hours      CARDIOVASCULAR  HR: 87 (01-11-25 @ 02:45) (72 - 91)  BP: 91/55 (01-11-25 @ 02:45) (80/46 - 124/56)  BP(mean): 71 (01-11-25 @ 02:45) (58 - 86)  ABP: --  ABP(mean): --  Wt(kg): --  CVP(cm H2O): --  VBG - ( 10 Giancarlo 2025 18:11 )  pH: 7.36  /  pCO2: 43    /  pO2: 39    / HCO3: 24    / Base Excess: -1.1  /  SaO2: 65.4   Lactate: 3.0                Exam: Normal S1/S2 w/o murmurs or rubs  Cardiac Rhythm:   Perfusion     [ ]Adequate   [ ]Inadequate  Mentation   [ ]Normal       [ ]Reduced  Extremities  [ ]Warm         [ ]Cool  Volume Status [ ]Hypervolemic [ ]Euvolemic [ ]Hypovolemic  Meds: metoprolol tartrate 12.5 milliGRAM(s) Oral every 12 hours  midodrine 20 milliGRAM(s) Oral every 8 hours  phenylephrine    Infusion 0.2 MICROgram(s)/kG/Min IV Continuous <Continuous>      GI/NUTRITION  Exam:   Diet:   Last Bowel Movement: 10-Giancarlo-2025 (01-10-25 @ 19:00)  Last Bowel Movement: 10-Giancarlo-2025 (01-10-25 @ 07:00)  Last Bowel Movement: 09-Jan-2025 (01-09-25 @ 19:00)  Last Bowel Movement: 09-Jan-2025 (01-09-25 @ 07:00)  Last Bowel Movement: 08-Jan-2025 (01-08-25 @ 19:00)  Last Bowel Movement: 08-Jan-2025 (01-08-25 @ 07:00)  Last Bowel Movement: 07-Jan-2025 (01-07-25 @ 19:00)  Last Bowel Movement: 06-Jan-2025 (01-06-25 @ 19:00)  Last Bowel Movement: 05-Jan-2025 (01-05-25 @ 19:00)  Last Bowel Movement: 05-Jan-2025 (01-05-25 @ 07:00)  Last Bowel Movement: 04-Jan-2025 (01-04-25 @ 19:00)  Last Bowel Movement: 04-Jan-2025 (01-04-25 @ 07:00)  Last Bowel Movement: 03-Jan-2025 (01-03-25 @ 19:00)  Last Bowel Movement: 02-Jan-2025 (01-02-25 @ 19:00)  Last Bowel Movement: 02-Jan-2025 (01-02-25 @ 07:00)  Last Bowel Movement: 01-Jan-2025 (01-01-25 @ 19:00)  Last Bowel Movement: 01-Jan-2025 (01-01-25 @ 07:00)  Last Bowel Movement: 31-Dec-2024 (12-31-24 @ 19:00)  Last Bowel Movement: 31-Dec-2024 (12-31-24 @ 07:00)  Last Bowel Movement: 30-Dec-2024 (12-30-24 @ 19:00)  Last Bowel Movement: 30-Dec-2024 (12-30-24 @ 07:00)  Last Bowel Movement: 29-Dec-2024 (12-29-24 @ 19:00)  Last Bowel Movement: 28-Dec-2024 (12-28-24 @ 07:00)    Meds: loperamide Liquid 2 milliGRAM(s) Enteral Tube every 6 hours  pantoprazole  Injectable 40 milliGRAM(s) IV Push every 12 hours  ursodiol Suspension 300 milliGRAM(s) Oral every 12 hours      GENITOURINARY  I&O's Detail    01-09 @ 07:01  -  01-10 @ 07:00  --------------------------------------------------------  IN:    Albumin 25%  -  50 mL: 300 mL    Albumin 5%  - 250 mL: 350 mL    Enteral Tube Flush: 550 mL    IV PiggyBack: 950 mL    IV PiggyBack: 450 mL    Platelets - Single Donor: 225 mL    PRBCs (Packed Red Blood Cells): 300 mL    Vital1.5: 780 mL  Total IN: 3905 mL    OUT:    Bulb (mL): 700 mL    Ileostomy (mL): 875 mL    Indwelling Catheter - Urethral (mL): 5 mL    Other (mL): 1890 mL    VAC (Vacuum Assisted Closure) System (mL): 50 mL  Total OUT: 3520 mL    Total NET: 385 mL      01-10 @ 07:01  -  01-11 @ 02:51  --------------------------------------------------------  IN:    Albumin 25%  -  50 mL: 100 mL    Albumin 5%  - 250 mL: 750 mL    Cryoprecipitate: 75 mL    Enteral Tube Flush: 380 mL    IV PiggyBack: 1000 mL    IV PiggyBack: 150 mL    Phenylephrine: 33.2 mL    Platelets - Single Donor: 225 mL    Vital1.5: 1040 mL  Total IN: 3753.2 mL    OUT:    Bulb (mL): 525 mL    Ileostomy (mL): 150 mL    Indwelling Catheter - Urethral (mL): 15 mL    Other (mL): 1606 mL    VAC (Vacuum Assisted Closure) System (mL): 200 mL  Total OUT: 2496 mL    Total NET: 1257.2 mL          01-11    136  |  103  |  27[H]  ----------------------------<  104[H]  4.1   |  21[L]  |  <0.30[L]    Ca    8.6      11 Jan 2025 00:23  Phos  3.1     01-11  Mg     2.5     01-11    TPro  4.5[L]  /  Alb  3.7  /  TBili  24.6[H]  /  DBili  x   /  AST  55[H]  /  ALT  28  /  AlkPhos  73  01-11    Meds: albumin human 25% IVPB 100 milliLiter(s) IV Intermittent every 6 hours      HEMATOLOGIC  Meds:                         8.4    3.55  )-----------( 30       ( 10 Giancarlo 2025 18:24 )             24.7     PT/INR - ( 10 Giancarlo 2025 18:24 )   PT: 32.2 sec;   INR: 2.85 ratio         PTT - ( 10 Giancarlo 2025 18:24 )  PTT:48.3 sec    INFECTIOUS DISEASES  T(C): 37 (01-10-25 @ 22:00), Max: 37 (01-10-25 @ 19:00)  Wt(kg): --  WBC Count: 3.55 K/uL (01-10 @ 18:24)  WBC Count: 2.43 K/uL (01-10 @ 06:30)    Recent Cultures:  Specimen Source: .Blood BLOOD, 01-05 @ 05:00; Results   No growth at 5 days; Gram Stain: --; Organism: --  Specimen Source: .Blood BLOOD, 01-05 @ 02:20; Results   No growth at 5 days; Gram Stain: --; Organism: --    Meds: caspofungin IVPB      caspofungin IVPB 50 milliGRAM(s) IV Intermittent every 24 hours  cycloSPORINE  , modified (GENGRAF) Solution 75 milliGRAM(s) Oral <User Schedule>  cycloSPORINE  , modified (GENGRAF) Solution 50 milliGRAM(s) Oral <User Schedule>  epoetin tonja (PROCRIT) Injectable 86259 Unit(s) IV Push every 7 days  ganciclovir IVPB 250 milliGRAM(s) IV Intermittent every 12 hours  imipenem/cilastatin  IVPB 500 milliGRAM(s) IV Intermittent every 6 hours  trimethoprim  40 mG/sulfamethoxazole 200 mG Suspension 80 milliGRAM(s) Enteral Tube daily      ENDOCRINE  Capillary Blood Glucose    Meds: insulin lispro (ADMELOG) corrective regimen sliding scale   SubCutaneous every 6 hours  insulin NPH human recombinant 4 Unit(s) SubCutaneous every 6 hours  methylPREDNISolone sodium succinate Injectable 32 milliGRAM(s) IV Push <User Schedule>      ACCESS DEVICES:  [ ] Peripheral IV  [ ] Central Venous Line		[ ] R	[ ] L	[ ] IJ	[ ] Fem	[ ] SC	Placed:   [ ] Arterial Line			[ ] R	[ ] L	[ ] Fem	[ ] Rad	[ ] Ax	Placed:   [ ] PICC:					[ ] Mediport  [ ] Urinary Catheter, Date Placed:   [ ] Necessity of urinary, arterial, and venous catheters discussed    OTHER MEDICATIONS:  chlorhexidine 0.12% Liquid 15 milliLiter(s) Oral Mucosa every 12 hours  chlorhexidine 2% Cloths 1 Application(s) Topical <User Schedule>  collagenase Ointment 1 Application(s) Topical daily  CRRT Treatment    <Continuous>  FIRST- Mouthwash  BLM 10 milliLiter(s) Swish and Spit every 8 hours PRN  mupirocin 2% Ointment 1 Application(s) Topical every 12 hours  nystatin Powder 1 Application(s) Topical every 12 hours  Phoxillum Filtration BK 4 / 2.5 5000 milliLiter(s) CRRT <Continuous>  PrismaSATE Dialysate BGK 4 / 2.5 5000 milliLiter(s) CRRT <Continuous>  PrismaSOL Filtration BGK 0 / 2.5 5000 milliLiter(s) CRRT <Continuous>      IMAGING: 24 HOUR EVENTS:  - 1x PLT for Plt 17, maintain over 20  - restart tube feeds given improved abdominal distension  - Vitamin K 10mg x1  - TEG  - 1 cryo  - 750 albumin  - started kassandra for pressure support  - switched metoprolol from 50 tid to 12.5 bid  - held imodium  - d/c bannatrol  - started 100cc 25% albumin q6 for 2 days  - d/c flagyl per ID      NEURO  Exam: follows at times, waxes and wanes  Meds: melatonin 5 milliGRAM(s) Oral at bedtime  oxyCODONE    Solution 5 milliGRAM(s) Oral every 4 hours PRN Severe Pain (7 - 10)  oxyCODONE    Solution 2.5 milliGRAM(s) Oral every 4 hours PRN Moderate Pain (4 - 6)  QUEtiapine 50 milliGRAM(s) Oral at bedtime      RESPIRATORY  RR: 25 (01-11-25 @ 02:45) (18 - 41)  SpO2: 97% (01-11-25 @ 02:45) (92% - 97%)  Wt(kg): --  Exam: Lungs CTA b/l  Mechanical Ventilation: Mode: CPAP with PS, RR (patient): 22, FiO2: 30, PEEP: 5, PS: 5, MAP: 9.4, PIP: 16    Meds: albuterol/ipratropium for Nebulization 3 milliLiter(s) Nebulizer every 6 hours PRN Shortness of Breath and/or Wheezing  buDESOnide    Inhalation Suspension 0.5 milliGRAM(s) Inhalation every 12 hours      CARDIOVASCULAR  HR: 87 (01-11-25 @ 02:45) (72 - 91)  BP: 91/55 (01-11-25 @ 02:45) (80/46 - 124/56)  BP(mean): 71 (01-11-25 @ 02:45) (58 - 86)  ABP: --  ABP(mean): --  Wt(kg): --  CVP(cm H2O): --  VBG - ( 10 Giancarlo 2025 18:11 )  pH: 7.36  /  pCO2: 43    /  pO2: 39    / HCO3: 24    / Base Excess: -1.1  /  SaO2: 65.4   Lactate: 3.0                Exam: Normal S1/S2 w/o murmurs or rubs  Cardiac Rhythm:   Perfusion     [ x]Adequate   [ ]Inadequate  Mentation   [x ]Normal       [ ]Reduced  Extremities  [x ]Warm         [ ]Cool  Volume Status [ ]Hypervolemic [x ]Euvolemic [ ]Hypovolemic  Meds: metoprolol tartrate 12.5 milliGRAM(s) Oral every 12 hours  midodrine 20 milliGRAM(s) Oral every 8 hours  phenylephrine    Infusion 0.2 MICROgram(s)/kG/Min IV Continuous <Continuous>      GI/NUTRITION  Exam: soft, nontender  Diet: TF  Last Bowel Movement: 10-Giancarlo-2025 (01-10-25 @ 19:00)  Last Bowel Movement: 10-Giancarlo-2025 (01-10-25 @ 07:00)  Last Bowel Movement: 09-Jan-2025 (01-09-25 @ 19:00)  Last Bowel Movement: 09-Jan-2025 (01-09-25 @ 07:00)  Last Bowel Movement: 08-Jan-2025 (01-08-25 @ 19:00)  Last Bowel Movement: 08-Jan-2025 (01-08-25 @ 07:00)  Last Bowel Movement: 07-Jan-2025 (01-07-25 @ 19:00)  Last Bowel Movement: 06-Jan-2025 (01-06-25 @ 19:00)  Last Bowel Movement: 05-Jan-2025 (01-05-25 @ 19:00)  Last Bowel Movement: 05-Jan-2025 (01-05-25 @ 07:00)  Last Bowel Movement: 04-Jan-2025 (01-04-25 @ 19:00)  Last Bowel Movement: 04-Jan-2025 (01-04-25 @ 07:00)  Last Bowel Movement: 03-Jan-2025 (01-03-25 @ 19:00)  Last Bowel Movement: 02-Jan-2025 (01-02-25 @ 19:00)  Last Bowel Movement: 02-Jan-2025 (01-02-25 @ 07:00)  Last Bowel Movement: 01-Jan-2025 (01-01-25 @ 19:00)  Last Bowel Movement: 01-Jan-2025 (01-01-25 @ 07:00)  Last Bowel Movement: 31-Dec-2024 (12-31-24 @ 19:00)  Last Bowel Movement: 31-Dec-2024 (12-31-24 @ 07:00)  Last Bowel Movement: 30-Dec-2024 (12-30-24 @ 19:00)  Last Bowel Movement: 30-Dec-2024 (12-30-24 @ 07:00)  Last Bowel Movement: 29-Dec-2024 (12-29-24 @ 19:00)  Last Bowel Movement: 28-Dec-2024 (12-28-24 @ 07:00)    Meds: loperamide Liquid 2 milliGRAM(s) Enteral Tube every 6 hours  pantoprazole  Injectable 40 milliGRAM(s) IV Push every 12 hours  ursodiol Suspension 300 milliGRAM(s) Oral every 12 hours      GENITOURINARY  I&O's Detail    01-09 @ 07:01  -  01-10 @ 07:00  --------------------------------------------------------  IN:    Albumin 25%  -  50 mL: 300 mL    Albumin 5%  - 250 mL: 350 mL    Enteral Tube Flush: 550 mL    IV PiggyBack: 950 mL    IV PiggyBack: 450 mL    Platelets - Single Donor: 225 mL    PRBCs (Packed Red Blood Cells): 300 mL    Vital1.5: 780 mL  Total IN: 3905 mL    OUT:    Bulb (mL): 700 mL    Ileostomy (mL): 875 mL    Indwelling Catheter - Urethral (mL): 5 mL    Other (mL): 1890 mL    VAC (Vacuum Assisted Closure) System (mL): 50 mL  Total OUT: 3520 mL    Total NET: 385 mL      01-10 @ 07:01 - 01-11 @ 02:51  --------------------------------------------------------  IN:    Albumin 25%  -  50 mL: 100 mL    Albumin 5%  - 250 mL: 750 mL    Cryoprecipitate: 75 mL    Enteral Tube Flush: 380 mL    IV PiggyBack: 1000 mL    IV PiggyBack: 150 mL    Phenylephrine: 33.2 mL    Platelets - Single Donor: 225 mL    Vital1.5: 1040 mL  Total IN: 3753.2 mL    OUT:    Bulb (mL): 525 mL    Ileostomy (mL): 150 mL    Indwelling Catheter - Urethral (mL): 15 mL    Other (mL): 1606 mL    VAC (Vacuum Assisted Closure) System (mL): 200 mL  Total OUT: 2496 mL    Total NET: 1257.2 mL          01-11    136  |  103  |  27[H]  ----------------------------<  104[H]  4.1   |  21[L]  |  <0.30[L]    Ca    8.6      11 Jan 2025 00:23  Phos  3.1     01-11  Mg     2.5     01-11    TPro  4.5[L]  /  Alb  3.7  /  TBili  24.6[H]  /  DBili  x   /  AST  55[H]  /  ALT  28  /  AlkPhos  73  01-11    Meds: albumin human 25% IVPB 100 milliLiter(s) IV Intermittent every 6 hours      HEMATOLOGIC  Meds:                         8.4    3.55  )-----------( 30       ( 10 Giancarlo 2025 18:24 )             24.7     PT/INR - ( 10 Giancarlo 2025 18:24 )   PT: 32.2 sec;   INR: 2.85 ratio         PTT - ( 10 Giancarol 2025 18:24 )  PTT:48.3 sec    INFECTIOUS DISEASES  T(C): 37 (01-10-25 @ 22:00), Max: 37 (01-10-25 @ 19:00)  Wt(kg): --  WBC Count: 3.55 K/uL (01-10 @ 18:24)  WBC Count: 2.43 K/uL (01-10 @ 06:30)    Recent Cultures:  Specimen Source: .Blood BLOOD, 01-05 @ 05:00; Results   No growth at 5 days; Gram Stain: --; Organism: --  Specimen Source: .Blood BLOOD, 01-05 @ 02:20; Results   No growth at 5 days; Gram Stain: --; Organism: --    Meds: caspofungin IVPB      caspofungin IVPB 50 milliGRAM(s) IV Intermittent every 24 hours  cycloSPORINE  , modified (GENGRAF) Solution 75 milliGRAM(s) Oral <User Schedule>  cycloSPORINE  , modified (GENGRAF) Solution 50 milliGRAM(s) Oral <User Schedule>  epoetin tonja (PROCRIT) Injectable 54965 Unit(s) IV Push every 7 days  ganciclovir IVPB 250 milliGRAM(s) IV Intermittent every 12 hours  imipenem/cilastatin  IVPB 500 milliGRAM(s) IV Intermittent every 6 hours  trimethoprim  40 mG/sulfamethoxazole 200 mG Suspension 80 milliGRAM(s) Enteral Tube daily      ENDOCRINE  Capillary Blood Glucose    Meds: insulin lispro (ADMELOG) corrective regimen sliding scale   SubCutaneous every 6 hours  insulin NPH human recombinant 4 Unit(s) SubCutaneous every 6 hours  methylPREDNISolone sodium succinate Injectable 32 milliGRAM(s) IV Push <User Schedule>      ACCESS DEVICES:  [ ] Peripheral IV  [ ] Central Venous Line		[ ] R	[ ] L	[ ] IJ	[ ] Fem	[ ] SC	Placed:   [ ] Arterial Line			[ ] R	[ ] L	[ ] Fem	[ ] Rad	[ ] Ax	Placed:   [ ] PICC:					[ ] Mediport  [ ] Urinary Catheter, Date Placed:   [ ] Necessity of urinary, arterial, and venous catheters discussed    OTHER MEDICATIONS:  chlorhexidine 0.12% Liquid 15 milliLiter(s) Oral Mucosa every 12 hours  chlorhexidine 2% Cloths 1 Application(s) Topical <User Schedule>  collagenase Ointment 1 Application(s) Topical daily  CRRT Treatment    <Continuous>  FIRST- Mouthwash  BLM 10 milliLiter(s) Swish and Spit every 8 hours PRN  mupirocin 2% Ointment 1 Application(s) Topical every 12 hours  nystatin Powder 1 Application(s) Topical every 12 hours  Phoxillum Filtration BK 4 / 2.5 5000 milliLiter(s) CRRT <Continuous>  PrismaSATE Dialysate BGK 4 / 2.5 5000 milliLiter(s) CRRT <Continuous>  PrismaSOL Filtration BGK 0 / 2.5 5000 milliLiter(s) CRRT <Continuous>      IMAGING:

## 2025-01-11 NOTE — PROGRESS NOTE ADULT - ASSESSMENT
74 severely ill male s/p OLT 11/2024 w prolonged hospital course requiring total abdominal colectomy and end ileostomy and eventual tracheostomy w prolonged SICU stay for continued hemodynamic instability and on-going possible sepsis vs liver failure. Colorectal surgery initially consulted for colectomy assistance and then high ileostomy output. Now, reconsulted for decreased output concerning for obstruction. Patient with worsening hemodynamic status requiring increased amounts of vasopressors.     Plan:  - CT A/P obtained by SICU team  - Colorectal to review imaging and f.u final read  - Maintain NPO w NGT to suction    Discussed with senior resident Dr. Karlos Hoyos  Pending discussion with Dr. Gibson  [facility]  [phone]   74 severely ill male s/p OLT 11/2024 w prolonged hospital course requiring total abdominal colectomy and end ileostomy and eventual tracheostomy w prolonged SICU stay for continued hemodynamic instability and on-going possible sepsis vs liver failure. Colorectal surgery initially consulted for colectomy assistance and then high ileostomy output. Now, reconsulted for decreased output concerning for obstruction. Patient with worsening hemodynamic status requiring increased amounts of vasopressors.     Plan:  - CT A/P obtained by SICU team  - Colorectal to review imaging and f.u final read  - Maintain NPO w NGT to suction    ADDENDUM:  CTA/P with no signs of obstruction. Possible enteritis. Small foci of air: pneumatosis vs air trapped intra-luminal. Recommend to correlate with lactate.   Patient with persistently elevated lactate, unable to correlate clinically at this time. Pending further discussion with attending.     Discussed with senior resident Dr. Karlos Hoyos  Pending discussion with Dr. Gibson  [facility]  [phone]   74 severely ill male s/p OLT 11/2024 w prolonged hospital course requiring total abdominal colectomy and end ileostomy and eventual tracheostomy w prolonged SICU stay for continued hemodynamic instability and on-going possible sepsis vs liver failure. Colorectal surgery initially consulted for colectomy assistance and then high ileostomy output. Now, reconsulted for decreased output concerning for obstruction. Patient with worsening hemodynamic status requiring increased amounts of vasopressors.     Plan:  - CT A/P obtained by SICU team  - Colorectal to review imaging and f.u final read  - Maintain NPO w NGT to suction    ADDENDUM:  CTA/P with no signs of obstruction. Possible enteritis. Small foci of air: pneumatosis vs air trapped intra-luminal. Recommend to correlate with lactate.   Patient with persistently elevated lactate, unable to correlate clinically at this time.  Patient is a very poor surgical candidate. Would not tolerate an exploration. NSQIP calculated 85% risk of death if taken to OR for emergent exploration.     Recommend palliative care consult and GOC.     Discussed with senior resident Dr. Karlos Hoyos  Discussed with Dr. Gibson  [facility]  [phone]   74 severely ill male s/p OLT 11/2024 w prolonged hospital course requiring total abdominal colectomy and end ileostomy and eventual tracheostomy w prolonged SICU stay for continued hemodynamic instability and on-going possible sepsis vs liver failure. Colorectal surgery initially consulted for colectomy assistance and then high ileostomy output. Now, reconsulted for decreased output concerning for obstruction. Patient with worsening hemodynamic status requiring increased amounts of vasopressors.     Plan:  - CT A/P obtained by SICU team  - Colorectal to review imaging and f.u final read  - Maintain NPO w NGT to suction    ADDENDUM:  CTA/P with no signs of obstruction. Possible enteritis. Small foci of air: pneumatosis vs air trapped intra-luminal. Recommend to correlate with lactate.   Patient with persistently elevated lactate, unable to correlate clinically at this time.  Patient is a very poor surgical candidate. Would not tolerate an exploration. NSQIP calculated at least 85% risk of death if taken to OR for emergent exploration. Risk is likely even higher given inability to account for transplant status in NSQIP calculator.      Recommend palliative care consult and GOC.     Discussed with senior resident Dr. Karlos Hoyos  Discussed with Dr. Gibson  [facility]  [phone]

## 2025-01-11 NOTE — PROGRESS NOTE ADULT - SUBJECTIVE AND OBJECTIVE BOX
[facility] DIVISION OF KIDNEY DISEASES AND HYPERTENSION -- FOLLOW UP NOTE  --------------------------------------------------------------------------------  Chief Complaint: Oliguric MORAIMA in OLT transplant     24 hour events/subjective: Pt. seen and examined on CRRT.  bAck on vasopressors.       PAST HISTORY  --------------------------------------------------------------------------------  No significant changes to PMH, PSH, FHx, SHx, unless otherwise noted    ALLERGIES & MEDICATIONS  --------------------------------------------------------------------------------  Allergies    No Known Allergies    Intolerances            REVIEW OF SYSTEMS  --------------------------------------------------------------------------------  Unable to obtain ROS due to current clinical status.     MEDICATIONS  (STANDING):  albumin human 25% IVPB 100 milliLiter(s) IV Intermittent every 6 hours  buDESOnide    Inhalation Suspension 0.5 milliGRAM(s) Inhalation every 12 hours  caspofungin IVPB 50 milliGRAM(s) IV Intermittent every 24 hours  caspofungin IVPB      chlorhexidine 0.12% Liquid 15 milliLiter(s) Oral Mucosa every 12 hours  chlorhexidine 2% Cloths 1 Application(s) Topical <User Schedule>  collagenase Ointment 1 Application(s) Topical daily  CRRT Treatment    <Continuous>  cycloSPORINE  , modified (GENGRAF) Solution 75 milliGRAM(s) Oral <User Schedule>  cycloSPORINE  , modified (GENGRAF) Solution 50 milliGRAM(s) Oral <User Schedule>  epoetin tonja (PROCRIT) Injectable 85304 Unit(s) IV Push every 7 days  ganciclovir IVPB 250 milliGRAM(s) IV Intermittent every 12 hours  imipenem/cilastatin  IVPB 500 milliGRAM(s) IV Intermittent every 6 hours  insulin lispro (ADMELOG) corrective regimen sliding scale   SubCutaneous every 6 hours  insulin NPH human recombinant 4 Unit(s) SubCutaneous every 6 hours  melatonin 5 milliGRAM(s) Oral at bedtime  methylPREDNISolone sodium succinate Injectable 32 milliGRAM(s) IV Push <User Schedule>  metoprolol tartrate 12.5 milliGRAM(s) Oral every 12 hours  mupirocin 2% Ointment 1 Application(s) Topical every 12 hours  nystatin Powder 1 Application(s) Topical every 12 hours  pantoprazole  Injectable 40 milliGRAM(s) IV Push every 12 hours  phenylephrine    Infusion 0.2 MICROgram(s)/kG/Min (7.46 mL/Hr) IV Continuous <Continuous>  Phoxillum Filtration BK 4 / 2.5 5000 milliLiter(s) (1200 mL/Hr) CRRT <Continuous>  PrismaSATE Dialysate BGK 4 / 2.5 5000 milliLiter(s) (1500 mL/Hr) CRRT <Continuous>  PrismaSOL Filtration BGK 0 / 2.5 5000 milliLiter(s) (200 mL/Hr) CRRT <Continuous>  QUEtiapine 50 milliGRAM(s) Oral at bedtime  trimethoprim  40 mG/sulfamethoxazole 200 mG Suspension 80 milliGRAM(s) Enteral Tube daily  ursodiol Suspension 300 milliGRAM(s) Oral every 12 hours    MEDICATIONS  (PRN):  albuterol/ipratropium for Nebulization 3 milliLiter(s) Nebulizer every 6 hours PRN Shortness of Breath and/or Wheezing  FIRST- Mouthwash  BLM 10 milliLiter(s) Swish and Spit every 8 hours PRN Mouth Care  oxyCODONE    Solution 5 milliGRAM(s) Oral every 4 hours PRN Severe Pain (7 - 10)  oxyCODONE    Solution 2.5 milliGRAM(s) Oral every 4 hours PRN Moderate Pain (4 - 6)      Vital Signs Last 24 Hrs  T(C): 37.4 (11 Jan 2025 07:00), Max: 37.4 (11 Jan 2025 05:00)  T(F): 99.3 (11 Jan 2025 07:00), Max: 99.3 (11 Jan 2025 05:00)  HR: 102 (11 Jan 2025 11:00) (79 - 105)  BP: 92/52 (11 Jan 2025 11:00) (79/48 - 124/56)  BP(mean): 67 (11 Jan 2025 11:00) (58 - 89)  RR: 20 (11 Jan 2025 11:00) (20 - 41)  SpO2: 94% (11 Jan 2025 11:00) (89% - 99%)    Parameters below as of 11 Jan 2025 07:00  Patient On (Oxygen Delivery Method): ventilator        I&O's Summary    10 Giancarlo 2025 07:01  -  11 Jan 2025 07:00  --------------------------------------------------------  IN: 4405.4 mL / OUT: 4139 mL / NET: 266.4 mL    11 Jan 2025 07:01  -  11 Jan 2025 11:20  --------------------------------------------------------  IN: 1064.8 mL / OUT: 752 mL / NET: 312.8 mL      Physical Exam:  Gen: on vent via tracheostomy. opens eyes.  HEENT: Icterus present, +NGT  Abdomen: + ileostomy with liquid brown stool, VAC dressing present   MSK: +LE edema and UE edema.   Skin: Superficial skin ulceration b/l thighs/dark necrotic appearing scrotum.   Vascular: Right IJ temporary dialysis catheter     LABS/STUDIES  --------------------------------------------------------------------------------                              7.5    4.03  )-----------( 19       ( 11 Jan 2025 06:01 )             22.1     01-11    139  |  102  |  28[H]  ----------------------------<  115[H]  4.4   |  19[L]  |  <0.30[L]    Ca    8.6      11 Jan 2025 06:01  Phos  3.3     01-11  Mg     2.5     01-11    TPro  4.7[L]  /  Alb  3.9  /  TBili  27.4[H]  /  DBili  x   /  AST  53[H]  /  ALT  31  /  AlkPhos  78  01-11      Culture - Blood (collected 01-05-25 @ 05:00)  Source: .Blood BLOOD  Final Report (01-10-25 @ 09:01):    No growth at 5 days    Culture - Blood (collected 01-05-25 @ 02:20)  Source: .Blood BLOOD  Final Report (01-10-25 @ 09:01):    No growth at 5 days

## 2025-01-11 NOTE — PROCEDURE NOTE - NSANESTHESIA_GEN_A_CORE
1% lidocaine
3% lidocaine
2% lidocaine
1% lidocaine
1% lidocaine
2% lidocaine
1% lidocaine
no anesthesia administered
1% lidocaine

## 2025-01-11 NOTE — CHART NOTE - NSCHARTNOTEFT_GEN_A_CORE
Called by SICU for hole in external nichols bag.  Given pt with significant phimosis and difficulty catheterizing, would recommend changing external nichols bag rather than catheter given increased risk during catheter exchange.   Discussed with Dr. Srivastava. Called by SICU for hole in external nichols bag.  Given pt with significant phimosis and difficulty catheterizing, would recommend changing external nichols bag rather than catheter given increased risk of infeciton/trauma during repeat catheter exchange.   Discussed with attending on call, Dr. Loving Called by SICU for hole in external nichols bag.  Given pt with significant phimosis and difficulty catheterizing, would recommend changing external nichols bag rather than catheter given increased risk of infeciton/trauma during repeat catheter exchange.   Discussed with attending on call, Dr. Loving.    please recall prn for any urologic questions or concerns  pager [phone]

## 2025-01-11 NOTE — PROGRESS NOTE ADULT - ASSESSMENT
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   s/p OLT 11/15 with complicated post op course    [] Septic shock/Cardiogenic shock  [] s/p Colectomy 12/7   [] RTOR for closure and Ileostomy creation   [] IAPB 12/7- removed 12/10  -> E Coli Bacteremia 12/6  -> Ec faecalis UTI (12/16)  -> Stenotrophaonas in trach apirate (12/19)  -> Ec Faecalis in Asc fluid (12/20) (12/26)  -> Clostridium paraputrifucum in blood 12/23, cultures have since cleared  - Tx ID following - on Imipenem/Caspo/Flagyl/Bactrim DS   - 1/1 CT CAP: small pleural effusions, enteritis, rest ok  - all lines changed over 1/3  - Wound care for sacral decubitus ulcer  - midodrine inc 20 q8h, back on vasopressor drip  - New blood and urine Cx ordered 1/11    [] MORAIMA:   -CRRT started 12/7, stopped 12/15, resumed CRRT 12/23  - still anuric     [ ] UGIB s/p EGD (12/26) showing generalized oozing, coagulopathy  - Correct coagulopathy per SICU  - Protonix IV BID  - TF at goal    [] s/p OLT  - poor graft function, trial of plasmapheresis 1/3, 1/5, 1/7 for (3-5 days), PLEX #4 1/9,   - IVIG #1 on 1/8, #2 1/9, #3 tomorrow  - repeat liver US unchanged  - Diet: TFs to goal  - Pain management: avoid narcotics  - Strict I&Os, wound vac placed 12/10   - US: L brachial DVT (holding A/C)  - wound vac care  - ursodiol 300mgBID     [ ] Immunosuppression  - Tacrolimus stopped 11/18 in setting of AMS/Seizure, Started Cyclo 12/17  - Cyclo per level, MMF HELD, Solumedrol 32 mg daily    [] lleus   -@ home on Linzess  - imaging with distended colon, improving, (likely opioid induced)  - s/p Neostigmine x 2  - s/p Relistor, last dose 12/1  - s/p colectomy with end ileostomy  (see above)  - ileostomy with >1L output, lomotil and imodium as needed, and change tube feeds  - Colorectal following  - replace losses as able  - Ileostomy output down to 150 mL/day. HOLD antimotility drugs, digitized (fascia wnl)  - CT Abd ordered f/u    [] CMV viremia  - CMV PCR (12/11 and 12/16): neg, positive CMV PCR on 12/23  - CMV viral load incr from 537 to 1240 1/6; ganciclovir inc to BID, next check 1/13      [ ] AMS  - Reintubated 11/20 Aspiration/hypoxia, extubated 11/24->[id number]/24--> extubated 11/26 -> [id number] -> tracheostomy 12/17 -> trach collar trials starting 12/29  - EEG 11/30 negative, Neurology following  - BH following   - off tacro  - on keppra  -CT Head No Cont (12/20): Age-indeterminate posterior left frontal lobe infarct.   -CT Head (12/25): Evolving subacute infarct in the left supramarginal gyrus  -repeat CTH ok, poor MS   - melatonin QHS   - Seroquel 25mg QD per psych    [ ] HTN/ pAFib  [ ] coronary vasospasm vs posterior wall MI  - remains off all a/c, failed hep gtt/argatroban due to UGIB  - Cards- plan for PCI prior to DC   - Off Amiodarone, off dobutamine  - BB as needed  - Dr. 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   s/p OLT 11/15 with complicated post op course    [] Septic shock/Cardiogenic shock  [] s/p Colectomy 12/7   [] RTOR for closure and Ileostomy creation   [] IAPB 12/7- removed 12/10  -> E Coli Bacteremia 12/6  -> Ec faecalis UTI (12/16)  -> Stenotrophaonas in trach apirate (12/19)  -> Ec Faecalis in Asc fluid (12/20) (12/26)  -> Clostridium paraputrifucum in blood 12/23, cultures have since cleared  - Tx ID following - on Imipenem/Caspo/Flagyl/Bactrim DS   - 1/1 CT CAP: small pleural effusions, enteritis, rest ok  - all lines changed over 1/3  - Wound care for sacral decubitus ulcer  - midodrine inc 20 q8h, back on vasopressor drip  - New blood and urine Cx ordered 1/11  - CT chest/abd/pelvis today 1/11    [] MORAIMA:   -CRRT started 12/7, stopped 12/15, resumed CRRT 12/23  - still anuric     [ ] UGIB s/p EGD (12/26) showing generalized oozing, coagulopathy  - Correct coagulopathy per SICU  - Protonix IV BID  - TF at goal    [] s/p OLT  - poor graft function, trial of plasmapheresis 1/3, 1/5, 1/7 for (3-5 days), PLEX #4 1/9,   - IVIG #1 on 1/8, #2 1/9   - PLEX and IVIG held today in setting of hypotension 1/11  - repeat liver US unchanged  - Diet: TFs to goal  - Pain management: avoid narcotics  - Strict I&Os, wound vac placed 12/10   - US: L brachial DVT (holding A/C)  - wound vac care  - ursodiol 300mgBID     [ ] Immunosuppression  - Tacrolimus stopped 11/18 in setting of AMS/Seizure, Started Cyclo 12/17  - Cyclo per level, MMF HELD, Solumedrol 32 mg daily    [] lleus   -@ home on Linzess  - imaging with distended colon, improving, (likely opioid induced)  - s/p Neostigmine x 2  - s/p Relistor, last dose 12/1  - s/p colectomy with end ileostomy  (see above)  - ileostomy with >1L output, lomotil and imodium as needed, and change tube feeds  - Colorectal following  - replace losses as able  - Ileostomy output down to 150 mL/day. HOLD antimotility drugs, digitized (fascia wnl)    [] CMV viremia  - CMV PCR (12/11 and 12/16): neg, positive CMV PCR on 12/23  - CMV viral load incr from 537 to 1240 1/6; ganciclovir inc to BID, next check 1/13      [ ] AMS  - Reintubated 11/20 Aspiration/hypoxia, extubated 11/24->[id number]/24--> extubated 11/26 -> [id number]/7 -> tracheostomy 12/17 -> trach collar trials starting 12/29  - EEG 11/30 negative, Neurology following  - BH following   - off tacro  - on keppra  -CT Head No Cont (12/20): Age-indeterminate posterior left frontal lobe infarct.   -CT Head (12/25): Evolving subacute infarct in the left supramarginal gyrus  -repeat CTH ok, poor MS   - melatonin QHS   - Seroquel 25mg QD per psych    [ ] HTN/ pAFib  [ ] coronary vasospasm vs posterior wall MI  - remains off all a/c, failed hep gtt/argatroban due to UGIB  - Cards- plan for PCI prior to DC   - Off Amiodarone, off dobutamine  - BB as needed  - Dr. Quintanilla following    [ ] DM  - per SICU     [] Scrotal abscess   - US (12/12): 2.1 x0.9 x 3.2 cm focal, right testicle- possible abscess (12/12)  - Urology recs: CT scrotum review no debridement required  - Urology recs Derm consult for guidance on wound care   - 12/23 US doppler scrotum: no arterial flow, limited venous flow, bl hydrocele  - 12/15 CT CAP no acute changes   - Elevate scrotum w/ support Urology signed off 12/29

## 2025-01-11 NOTE — PROGRESS NOTE ADULT - ASSESSMENT
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 Post-op course c/b worsening mental status and acute hypoxic respiratory failure requiring multiple intubations/extubations, currently extubated (as of 11/26/24) and colonic ileus s/p several rounds of Relistor and Neostigmine with NGT and rectal tube currently in place now with septic shock due to ischemic bowel s/p total colectomy with ostomy creation with MORAIMA on CRRT.      1. s/p OLT 11/15/24 with oliguric MORAIMA in setting of septic shock and poor liver function.   - CT AP non-con: Bilateral renal cysts including cysts with peripheral calcification in the left kidney.  - Initiated on CRRT 12/7/24- 12/15/24 at 1AM stopped. s/p IHD 12/18/24 however required levophed.   - Has been back on CRRT but stopped 1/7/25 due to catheter malfunction. Restarted 1/8/25 at 8PM with new catheter.   - Continue CRRT today.

## 2025-01-12 NOTE — PROGRESS NOTE ADULT - SUBJECTIVE AND OBJECTIVE BOX
INFECTIOUS DISEASES FOLLOW UP-- Renée Carrero  [phone]    This is a follow up note for this  74yMale with  Status post liver transplantation  trach/vent  non responsive  increasing pressor requirements  minimal output froom ostomy, but output from NG tube ?early SBO      ROS:  CONSTITUTIONAL:  Non responsive  trach/vent    Allergies    No Known Allergies    Intolerances        ANTIBIOTICS/RELEVANT:  antimicrobials  ganciclovir IVPB 250 milliGRAM(s) IV Intermittent every 12 hours  imipenem/cilastatin  IVPB 500 milliGRAM(s) IV Intermittent every 6 hours  trimethoprim / sulfamethoxazole IVPB 160 milliGRAM(s) IV Intermittent daily  voriconazole IVPB 200 milliGRAM(s) IV Intermittent every 12 hours    immunologic:  epoetin tonja (PROCRIT) Injectable 74532 Unit(s) IV Push every 7 days    OTHER:  albumin human 25% IVPB 100 milliLiter(s) IV Intermittent every 6 hours  albuterol/ipratropium for Nebulization 3 milliLiter(s) Nebulizer every 6 hours PRN  buDESOnide    Inhalation Suspension 0.5 milliGRAM(s) Inhalation every 12 hours  chlorhexidine 0.12% Liquid 15 milliLiter(s) Oral Mucosa every 12 hours  chlorhexidine 2% Cloths 1 Application(s) Topical <User Schedule>  collagenase Ointment 1 Application(s) Topical daily  CRRT Treatment    <Continuous>  FIRST- Mouthwash  BLM 10 milliLiter(s) Swish and Spit every 8 hours PRN  HYDROmorphone  Injectable 0.25 milliGRAM(s) IV Push every 3 hours PRN  metoprolol tartrate Injectable 5 milliGRAM(s) IV Push every 6 hours  mupirocin 2% Ointment 1 Application(s) Topical every 12 hours  nystatin Powder 1 Application(s) Topical every 12 hours  pantoprazole  Injectable 40 milliGRAM(s) IV Push every 12 hours  phenylephrine    Infusion 0.2 MICROgram(s)/kG/Min IV Continuous <Continuous>  Phoxillum Filtration BK 4 / 2.5 5000 milliLiter(s) CRRT <Continuous>  PrismaSATE Dialysate BGK 4 / 2.5 5000 milliLiter(s) CRRT <Continuous>  PrismaSOL Filtration BGK 0 / 2.5 5000 milliLiter(s) CRRT <Continuous>  vasopressin Infusion 0.03 Unit(s)/Min IV Continuous <Continuous>      Objective:  Vital Signs Last 24 Hrs  T(C): 37.3 (12 Jan 2025 11:00), Max: 37.6 (12 Jan 2025 05:00)  T(F): 99.1 (12 Jan 2025 11:00), Max: 99.7 (12 Jan 2025 05:00)  HR: 85 (12 Jan 2025 12:08) (84 - 119)  BP: 144/71 (12 Jan 2025 01:15) (80/50 - 144/71)  BP(mean): 99 (12 Jan 2025 01:15) (60 - 100)  RR: 14 (12 Jan 2025 11:30) (14 - 27)  SpO2: 97% (12 Jan 2025 12:08) (90% - 100%)    Parameters below as of 12 Jan 2025 05:44  Patient On (Oxygen Delivery Method): ventilator        PHYSICAL EXAM:  Constitutional: sarcopenic  Eyes:DUSTIN,   Ear/Nose/Throat: trach dried blood around site	  Respiratory: coarse Bs diminished bilaterally  Cardiovascular: S1S2 tachy  Gastrointestinal: distended, open vertical midline, ostomy pink but no stool in bag  Extremities:no e/e/c  No Lymphadenopathy  IV sites not inflammed.    LABS:                        6.9    1.99  )-----------( 20       ( 12 Jan 2025 11:43 )             20.4     01-12    138  |  103  |  25[H]  ----------------------------<  113[H]  4.0   |  19[L]  |  <0.30[L]    Ca    8.6      12 Jan 2025 11:43  Phos  2.8     01-12  Mg     2.4     01-12    TPro  4.4[L]  /  Alb  3.8  /  TBili  25.2[H]  /  DBili  x   /  AST  38  /  ALT  22  /  AlkPhos  74  01-12    PT/INR - ( 12 Jan 2025 11:43 )   PT: 35.7 sec;   INR: 3.17 ratio         PTT - ( 12 Jan 2025 11:43 )  PTT:67.2 sec  Urinalysis Basic - ( 12 Jan 2025 11:43 )    Color: x / Appearance: x / SG: x / pH: x  Gluc: 113 mg/dL / Ketone: x  / Bili: x / Urobili: x   Blood: x / Protein: x / Nitrite: x   Leuk Esterase: x / RBC: x / WBC x   Sq Epi: x / Non Sq Epi: x / Bacteria: x        MICROBIOLOGY:    blood cxs x2 sent 1/11 and pending  sputum sent 1/11 pending        RECENT CULTURES:  Cytomegalovirus By PCR: 1240 IU/mL (01.06.25 @ 00:25)        RADIOLOGY & ADDITIONAL STUDIES:    < from: CT Abdomen and Pelvis w/ IV Cont (01.11.25 @ 12:28) >    IMPRESSION:  New cavitary left apical lesions concerning for atypical infection,   likely fungal.    Bilateral pleural effusions with complete right lower lobe and near   complete left lower lobe atelectasis.    Fluid-filled mildly distended left abdominal small bowel loops are again   noted which may represent enteritis. Questionable focal pneumatosis   versus trapped bubbles of intraluminal air in a loop of small bowel.   Lactate level correlation is recommended.    Mild to moderate ascites and diffuse anasarca.    --- End of Report ---    < end of copied text >  < from: Xray Chest 1 View- PORTABLE-Routine (Xray Chest 1 View- PORTABLE-Routine in AM.) (01.12.25 @ 07:42) >  FINDINGS/  IMPRESSION:  The nasogastric tube courses below the left hemidiaphragm, tip off edge   of film. Tracheostomy tube is present. Right sided central line is in the   region of the superior vena cava.  HEART:Enlarged.  LUNGS: There are bibasilar opacities compatible with   effusion/infiltrate.. Similar to prior.  BONES: cervical fusion hardware    --- End of Report ---    < end of copied text >

## 2025-01-12 NOTE — PROGRESS NOTE ADULT - ASSESSMENT
74 severely ill male s/p OLT 11/2024 w prolonged hospital course requiring total abdominal colectomy and end ileostomy and eventual tracheostomy w prolonged SICU stay for continued hemodynamic instability and on-going possible sepsis vs liver failure. Colorectal surgery initially consulted for colectomy assistance and then high ileostomy output. Now, reconsulted for decreased output concerning for obstruction. Patient with worsening hemodynamic status requiring increased amounts of vasopressors. CT performed yesterday.       Plan:  -Patient is a very poor surgical candidate. Would not tolerate an exploration. NSQIP calculated at least 85% risk of death if taken to OR for emergent exploration. Risk is likely even higher given inability to account for transplant status in NSQIP calculator.    -Appreciate Palliative care consult and GOC.   -Appreciate SICU care

## 2025-01-12 NOTE — PROGRESS NOTE ADULT - SUBJECTIVE AND OBJECTIVE BOX
[facility] DIVISION OF KIDNEY DISEASES AND HYPERTENSION -- FOLLOW UP NOTE  --------------------------------------------------------------------------------  Chief Complaint: Oliguric MORAIMA in OLT transplant     24 hour events/subjective: Pt. seen and examined on CRRT.        PAST HISTORY  --------------------------------------------------------------------------------  No significant changes to PMH, PSH, FHx, SHx, unless otherwise noted    ALLERGIES & MEDICATIONS  --------------------------------------------------------------------------------  Allergies    No Known Allergies    Intolerances            REVIEW OF SYSTEMS  --------------------------------------------------------------------------------  Unable to obtain ROS due to current clinical status.     MEDICATIONS  (STANDING):  albumin human 25% IVPB 100 milliLiter(s) IV Intermittent every 6 hours  buDESOnide    Inhalation Suspension 0.5 milliGRAM(s) Inhalation every 12 hours  chlorhexidine 0.12% Liquid 15 milliLiter(s) Oral Mucosa every 12 hours  chlorhexidine 2% Cloths 1 Application(s) Topical <User Schedule>  collagenase Ointment 1 Application(s) Topical daily  CRRT Treatment    <Continuous>  epoetin tonja (PROCRIT) Injectable 23469 Unit(s) IV Push every 7 days  ganciclovir IVPB 250 milliGRAM(s) IV Intermittent every 12 hours  imipenem/cilastatin  IVPB 500 milliGRAM(s) IV Intermittent every 6 hours  metoprolol tartrate Injectable 5 milliGRAM(s) IV Push every 6 hours  mupirocin 2% Ointment 1 Application(s) Topical every 12 hours  nystatin Powder 1 Application(s) Topical every 12 hours  pantoprazole  Injectable 40 milliGRAM(s) IV Push every 12 hours  phenylephrine    Infusion 0.2 MICROgram(s)/kG/Min (7.46 mL/Hr) IV Continuous <Continuous>  Phoxillum Filtration BK 4 / 2.5 5000 milliLiter(s) (1200 mL/Hr) CRRT <Continuous>  PrismaSATE Dialysate BGK 4 / 2.5 5000 milliLiter(s) (1500 mL/Hr) CRRT <Continuous>  PrismaSOL Filtration BGK 0 / 2.5 5000 milliLiter(s) (200 mL/Hr) CRRT <Continuous>  trimethoprim / sulfamethoxazole IVPB 160 milliGRAM(s) IV Intermittent daily  vasopressin Infusion 0.03 Unit(s)/Min (4.5 mL/Hr) IV Continuous <Continuous>  voriconazole IVPB 200 milliGRAM(s) IV Intermittent every 12 hours    MEDICATIONS  (PRN):  albuterol/ipratropium for Nebulization 3 milliLiter(s) Nebulizer every 6 hours PRN Shortness of Breath and/or Wheezing  FIRST- Mouthwash  BLM 10 milliLiter(s) Swish and Spit every 8 hours PRN Mouth Care  HYDROmorphone  Injectable 0.25 milliGRAM(s) IV Push every 3 hours PRN Moderate Pain (4 - 6)      Vital Signs Last 24 Hrs  T(C): 37.3 (12 Jan 2025 11:00), Max: 37.6 (12 Jan 2025 05:00)  T(F): 99.1 (12 Jan 2025 11:00), Max: 99.7 (12 Jan 2025 05:00)  HR: 85 (12 Jan 2025 11:30) (84 - 119)  BP: 144/71 (12 Jan 2025 01:15) (80/50 - 144/71)  BP(mean): 99 (12 Jan 2025 01:15) (60 - 100)  RR: 14 (12 Jan 2025 11:30) (14 - 28)  SpO2: 97% (12 Jan 2025 11:30) (90% - 100%)    Parameters below as of 12 Jan 2025 05:44  Patient On (Oxygen Delivery Method): ventilator        I&O's Summary    11 Jan 2025 07:01  -  12 Jan 2025 07:00  --------------------------------------------------------  IN: 6008 mL / OUT: 3859 mL / NET: 2149 mL    12 Jan 2025 07:01  -  12 Jan 2025 11:38  --------------------------------------------------------  IN: 858.3 mL / OUT: 364 mL / NET: 494.3 mL            Physical Exam:  Gen: on vent via tracheostomy. opens eyes.  HEENT: Icterus present, +NGT  Abdomen: + ileostomy with liquid brown stool, VAC dressing present   MSK: +LE edema and UE edema.   Skin: Superficial skin ulceration b/l thighs/dark necrotic appearing scrotum.   Vascular: Right IJ temporary dialysis catheter     LABS/STUDIES  --------------------------------------------------------------------------------                           6.6    2.82  )-----------( 48       ( 12 Jan 2025 06:31 )             19.8     01-12    136  |  103  |  27[H]  ----------------------------<  105[H]  4.0   |  18[L]  |  <0.30[L]    Ca    9.2      12 Jan 2025 06:31  Phos  2.9     01-12  Mg     2.4     01-12    TPro  4.6[L]  /  Alb  3.8  /  TBili  26.1[H]  /  DBili  x   /  AST  45[H]  /  ALT  26  /  AlkPhos  82  01-12

## 2025-01-12 NOTE — PROGRESS NOTE ADULT - ASSESSMENT
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 Post op course complicated by acute hypoxic respiratory failure requiring intubation multiple times, most recently on 12/7 with conversion to tracheostomy on 12/17, e.coli bacteremia with RTOR on 12/7 for concern for worsening septic shock and cardiogenic shock s/p balloon pump placement and total abdominal colectomy in setting of ischemic bowel s/p OR on 12/9 for ileostomy creation, and olirugirc MORAIMA requiring CRRT and now intermittent HD.    Diagnosed with pneumonia secondary to Stenotrophomonas    Also with growth of Enterococcus faecalis from abdominal drains and urine culture    More fever and shock event on 12/29/24 likely 2/2 GIB    UA (12/19) 2 WBC  BCx (12/23) Clostridium paraputrificum  Bronch Cx (12/23) NGTD    CT Chest (12/23) Bibasilar groundglass and tree in bud opacities which may represent distal airway impaction versus pneumonia.    CT A/P (12/23) Peripheral wedge-shaped areas of hypoattenuation involving the superior aspect of the spleen, suggestive of splenic infarcts (12-59). Mildly dilated loops of proximal small bowel without transition point, likely ileus.    Testicular US (12/23) No appreciable arterial flow with very limited venous flow in in the testes bilaterally. Interval decrease in diffuse scrotal edema. Small bilateral hydroceles.    CT Pelvis (12/25) Moderate diffuse subcutaneous/scrotal edema without defined collection or subcutaneous air.    BCX (12/28): Gram variable rods (Blood PCR neg)    Antibiotic Course:  Meropenem ( 11/22-11/29, 11/22-11/29, 12/16-)   Minocycline (12/21-1/1)  Bactrim (11/16-11/24, 12/8-12/11, 12/21-)  Fluconazole (11/16-12/2, 12/12-12/16)   Caspofungin ( 12/6-12/11, 12/17-)  Cefepime (12/10-12/16)   Metronidazole (12/10-12/14)   Ganciclovir (12/7-12/13)   Linezolid (11/23-11/26, 12/6-12/11, 12/28-12/29)   Atovaquone (11/29-12/6)   Zosyn (11/20-11/22,12/6)   Tobramycin (12/6)   Valganciclovir (11/6-12/4)    #Positive Blood Culture (12/23 Clostridium paraputrificum) (12/28: Gram variable rods -Blood PCR neg)  Clostridium paraputrificum is generally penicillin and metronidazole susceptible      #Leukocytosis, Fever, Shock, Transaminitis,   #Stenotrophomonas tracheo/Pneumonia? s/p minocycline course  # polymicrobial bacteremia E faecalis; Clostridium spp in c/o Gi pathology but also necrotic scrotum     # 1/11-12 ; requiring pressors again, no ostomy output, new cavitary chest lesion seen on ct  sent blood cultures x2 sets and pending  sent sputum from trach and pending  new A-line placed  Send fungitell  send galactomanan  added voriconazole 200mg iV q 12hr      -Decrease immunosuppression to as low as feasible      #Liver Transplant Recipient, Prophylactic Antibiotic  --Restarted IV ganciclovir in induction dosing adjusted for dialysis  Cytomegalovirus By PCR: Det <34.5 IU/mL (12.23.24 @ 06:15)  Cytomegalovirus By PCR: 537 IU/mL (12.31.24 @ 13:36)  Cytomegalovirus By PCR: 1240 IU/mL (01.06.25 @ 00:25)    check CMV PCR 1/13/25      Wes Carrero MD  Can be called via Teams  After 5pm/weekends [phone]

## 2025-01-12 NOTE — PROGRESS NOTE ADULT - NS ATTEND AMEND GEN_ALL_CORE FT
74 yr M w liver failure, s/p OLT in nov 24 now w trach 12/17, bacteremia, return to OR, total colectomy, ileostomy creation, cardiogenic shock, oliguric renal failure requiring CRRT    ON: no operative plan, strict NPO    in bed, open eyes, unable to move extremeties due to deconditioning  CPAP 10/5 40%   portex 7 trach  AM CXR bibasilar pl effusions unchanged, but improved parenchymal disease  persistent met acidosis gas, likely liver related  decreased metop IV 5 q6hr  kassandra @ 0.9, vaso @ 0.03  midodrine on hold  fluctuating lactate, likely liver dysfunction related  TTE 12/11 EF 55%  NPO, NG in place 850cc  imodium, ostomy: minimal output  LFTs worsening, plasmapheresis, IVIG intermittently  ursodiol  bulb 300cc output in total , wound vac  CT shows possible ileus/obstruction, ? for pneumoatosis but not operative candidate due to high mortality   anuric, net balance 2.2 lit pos  phimosis, skin irritation of area, given this keep nichols   CRRT even  intermittent 25% albumin  hb stable, severe thrombocytopenia, requiring transfusions frequently  TEG w low FF  no chem VTE PPX  ID transplant following: voriconazole, flagyl, imipenem gancyclovir   combicath steno, bcx clostridium, e fecal from abd  afebrile overnight, chronic leukopenia likely from chronic   procal 2.26 (1/9)  monitor glucoses off insulin given no enteric feeds  steroid for immunosuppression  family at bedside   PT when able    rt IJ THC (1/8), left fem a line 1/11

## 2025-01-12 NOTE — PROGRESS NOTE ADULT - SUBJECTIVE AND OBJECTIVE BOX
24 HOUR EVENTS:  - NGT placed to suction given ileostomy output stop > over 1 L immediate output  - placed on full support given worsening medical condition  - CTH, chest, A/P, scrotum given concern for obstruction > no obstruction, enteritis, possible foci of air  - d/c midodrine given concern for absorption  - 1 U PRBC for Hgb < 8  - TEG corrected w 1 U Plt and 1 U cryo  - obtained troponins given EKG with ST changes again> elevated to 2300  - colorectal reconsulted, ntd  - started vaso for increasing kassandra requirements  - 250 albumin x 1      NEURO  Exam: follows commands  Meds: HYDROmorphone  Injectable 0.25 milliGRAM(s) IV Push every 3 hours PRN Moderate Pain (4 - 6)  HYDROmorphone  Injectable 0.5 milliGRAM(s) IV Push every 3 hours PRN Severe Pain (7 - 10)      RESPIRATORY  RR: 18 (01-11-25 @ 23:45) (16 - 35)  SpO2: 98% (01-11-25 @ 23:45) (89% - 100%)  Exam: unlabored  Mechanical Ventilation: Mode: AC/ CMV (Assist Control/ Continuous Mandatory Ventilation), RR (machine): 14, RR (patient): 21, TV (machine): 470, FiO2: 45, PEEP: 5, ITime: 1, MAP: 7.3, PIP: 14  ABG - ( 11 Jan 2025 18:09 )  pH: 7.34  /  pCO2: 38    /  pO2: 119   / HCO3: 20    / Base Excess: -4.8  /  SaO2: >100.0       Meds: albuterol/ipratropium for Nebulization 3 milliLiter(s) Nebulizer every 6 hours PRN Shortness of Breath and/or Wheezing  buDESOnide    Inhalation Suspension 0.5 milliGRAM(s) Inhalation every 12 hours      CARDIOVASCULAR  HR: 98 (01-11-25 @ 23:45) (79 - 119)  BP: 113/63 (01-11-25 @ 16:00) (79/48 - 113/63)  BP(mean): 82 (01-11-25 @ 16:00) (58 - 89)  ABP: 105/72 (01-11-25 @ 23:45) (87/47 - 120/61)  ABP(mean): 88 (01-11-25 @ 23:45) (60 - 92)  VBG - ( 11 Jan 2025 05:53 )  pH: 7.29  /  pCO2: 45    /  pO2: 46    / HCO3: 22    / Base Excess: -4.7  /  SaO2: 76.2   Lactate: 3.5          Exam:   Cardiac Rhythm:   Perfusion     [x ]Adequate   [ ]Inadequate  Mentation   [ x]Normal       [ ]Reduced  Extremities  [x ]Warm         [ ]Cool  Volume Status [ ]Hypervolemic [x]Euvolemic [ ]Hypovolemic  Meds: metoprolol tartrate Injectable 5 milliGRAM(s) IV Push every 6 hours  phenylephrine    Infusion 0.2 MICROgram(s)/kG/Min IV Continuous <Continuous>      GI/NUTRITION  Exam: soft, NT/ND  Diet: NPO  Meds: pantoprazole  Injectable 40 milliGRAM(s) IV Push every 12 hours      GENITOURINARY  I&O's Detail    01-10 @ 07:01 - 01-11 @ 07:00  --------------------------------------------------------  IN:    Albumin 25%  -  50 mL: 200 mL    Albumin 5%  - 250 mL: 750 mL    Cryoprecipitate: 75 mL    Enteral Tube Flush: 430 mL    IV PiggyBack: 150 mL    IV PiggyBack: 1200 mL    Phenylephrine: 205.4 mL    Platelets - Single Donor: 225 mL    Vital1.5: 1170 mL  Total IN: 4405.4 mL    OUT:    Bulb (mL): 575 mL    Ileostomy (mL): 150 mL    Indwelling Catheter - Urethral (mL): 20 mL    Nasogastric/Oral tube (mL): 1000 mL    Other (mL): 2194 mL    VAC (Vacuum Assisted Closure) System (mL): 200 mL  Total OUT: 4139 mL    Total NET: 266.4 mL      01-11 @ 07:01 - 01-12 @ 00:39  --------------------------------------------------------  IN:    Albumin 25%  -  50 mL: 100 mL    Albumin 5%  - 250 mL: 500 mL    Cryoprecipitate: 75 mL    Enteral Tube Flush: 100 mL    IV PiggyBack: 750 mL    Phenylephrine: 1801.9 mL    Platelets - Single Donor: 450 mL    PRBCs (Packed Red Blood Cells): 300 mL    Vasopressin: 18 mL  Total IN: 4094.9 mL    OUT:    Bulb (mL): 100 mL    Ileostomy (mL): 50 mL    Indwelling Catheter - Urethral (mL): 0 mL    Nasogastric/Oral tube (mL): 600 mL    Other (mL): 593 mL  Total OUT: 1343 mL    Total NET: 2751.9 mL          01-11    137  |  104  |  33[H]  ----------------------------<  94  4.1   |  21[L]  |  <0.30[L]    Ca    8.9      11 Jan 2025 18:11  Phos  3.4     01-11  Mg     2.4     01-11    TPro  4.4[L]  /  Alb  3.5  /  TBili  25.4[H]  /  DBili  x   /  AST  52[H]  /  ALT  27  /  AlkPhos  83  01-11    Meds: albumin human 25% IVPB 100 milliLiter(s) IV Intermittent every 6 hours      HEMATOLOGIC  Meds:                         7.9    2.30  )-----------( 38       ( 11 Jan 2025 18:10 )             22.8     PT/INR - ( 11 Jan 2025 18:11 )   PT: 29.4 sec;   INR: 2.58 ratio         PTT - ( 11 Jan 2025 18:11 )  PTT:57.3 sec    INFECTIOUS DISEASES  T(C): 37.4 (01-11-25 @ 22:00), Max: 37.4 (01-11-25 @ 05:00)  Wt(kg): --  WBC Count: 2.30 K/uL (01-11 @ 18:10)  WBC Count: 1.60 K/uL (01-11 @ 11:32)  WBC Count: 4.03 K/uL (01-11 @ 06:01)    Recent Cultures:  Specimen Source: .Blood BLOOD, 01-05 @ 05:00; Results   No growth at 5 days; Gram Stain: --; Organism: --  Specimen Source: .Blood BLOOD, 01-05 @ 02:20; Results   No growth at 5 days; Gram Stain: --; Organism: --    Meds: epoetin tonja (PROCRIT) Injectable 31175 Unit(s) IV Push every 7 days  ganciclovir IVPB 250 milliGRAM(s) IV Intermittent every 12 hours  imipenem/cilastatin  IVPB 500 milliGRAM(s) IV Intermittent every 6 hours  trimethoprim / sulfamethoxazole IVPB 160 milliGRAM(s) IV Intermittent daily  voriconazole IVPB 200 milliGRAM(s) IV Intermittent every 12 hours      ENDOCRINE  Capillary Blood Glucose    Meds: insulin lispro (ADMELOG) corrective regimen sliding scale   SubCutaneous every 6 hours  methylPREDNISolone sodium succinate Injectable 32 milliGRAM(s) IV Push <User Schedule>  vasopressin Infusion 0.03 Unit(s)/Min IV Continuous <Continuous>      ACCESS DEVICES:  [ ] Peripheral IV  [ ] Central Venous Line		[ ] R	[ ] L	[ ] IJ	[ ] Fem	[ ] SC	Placed:   [ ] Arterial Line			[ ] R	[ ] L	[ ] Fem	[ ] Rad	[ ] Ax	Placed:   [ ] PICC:					[ ] Mediport  [ ] Urinary Catheter, Date Placed:   [ ] Necessity of urinary, arterial, and venous catheters discussed    OTHER MEDICATIONS:  chlorhexidine 0.12% Liquid 15 milliLiter(s) Oral Mucosa every 12 hours  chlorhexidine 2% Cloths 1 Application(s) Topical <User Schedule>  collagenase Ointment 1 Application(s) Topical daily  CRRT Treatment    <Continuous>  FIRST- Mouthwash  BLM 10 milliLiter(s) Swish and Spit every 8 hours PRN  mupirocin 2% Ointment 1 Application(s) Topical every 12 hours  nystatin Powder 1 Application(s) Topical every 12 hours  Phoxillum Filtration BK 4 / 2.5 5000 milliLiter(s) CRRT <Continuous>  PrismaSATE Dialysate BGK 4 / 2.5 5000 milliLiter(s) CRRT <Continuous>  PrismaSOL Filtration BGK 0 / 2.5 5000 milliLiter(s) CRRT <Continuous>      IMAGING:

## 2025-01-12 NOTE — PROGRESS NOTE ADULT - SUBJECTIVE AND OBJECTIVE BOX
Transplant Surgery - Multidisciplinary Rounds  --------------------------------------------------------------  OLT   11/15/2024         Colectomy 12/7/2024     IABP 12/7 removed 12/10  Tracheostomy 12/17/2024    Present:   Patient seen and examined with multidisciplinary Transplant team including Surgeon: Dr. Dagher, Hepatologist Dr. White, JAZZ Colby/Ale/Jasmine, Fellow Dr. Garcia and bedside RN in AM rounds.   Disciplines not in attendance will be notified of the plan.     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   s/p OLT 11/15 with post op course c/b:  ·	Hypoxia: Reintubated 11/20, extubated 11/24->reintubated 11/24, extubated 11/26, reintubated 12/7, trached 12/17  ·	Ileus   ·	A fib  ·	AMS/Seizure   ·	L brachial DVT  ·	Neutropenia  ·	E coli Bacteremia (12/6)  ·	s/p Colectomy 12/7.   ·	IABP 12/7, removed 12/10  ·	Ec Faecalis UTI (12/16)  ·	Stenotrophamonas in trach aspirate (12/19)  ·	Clostridium in blood 12/23  ·	UGIB 12/26  ·	CMV Viremia  ·	MORAIMA requiring CRRT  ·	Shock liver    Interval/Overnight Events:   - afebrile, on levo and vaso  - 1uPlatelets, 1 uCryo, 1 uPRBC, albumin 25% x4  - CRRT net even  - Ostomy no longer having output    Immunosuppression:  - Cyclo HELD, MMF HELD, solumed 16  -ongoing monitoring for signs of rejection        MEDICATIONS  (STANDING):  albumin human 25% IVPB 100 milliLiter(s) IV Intermittent every 6 hours  buDESOnide    Inhalation Suspension 0.5 milliGRAM(s) Inhalation every 12 hours  chlorhexidine 0.12% Liquid 15 milliLiter(s) Oral Mucosa every 12 hours  chlorhexidine 2% Cloths 1 Application(s) Topical <User Schedule>  collagenase Ointment 1 Application(s) Topical daily  CRRT Treatment    <Continuous>  epoetin tonja (PROCRIT) Injectable 28013 Unit(s) IV Push every 7 days  ganciclovir IVPB 250 milliGRAM(s) IV Intermittent every 12 hours  imipenem/cilastatin  IVPB 500 milliGRAM(s) IV Intermittent every 6 hours  metoprolol tartrate Injectable 5 milliGRAM(s) IV Push every 6 hours  mupirocin 2% Ointment 1 Application(s) Topical every 12 hours  nystatin Powder 1 Application(s) Topical every 12 hours  pantoprazole  Injectable 40 milliGRAM(s) IV Push every 12 hours  phenylephrine    Infusion 0.2 MICROgram(s)/kG/Min (7.46 mL/Hr) IV Continuous <Continuous>  Phoxillum Filtration BK 4 / 2.5 5000 milliLiter(s) (1200 mL/Hr) CRRT <Continuous>  PrismaSATE Dialysate BGK 4 / 2.5 5000 milliLiter(s) (1500 mL/Hr) CRRT <Continuous>  PrismaSOL Filtration BGK 0 / 2.5 5000 milliLiter(s) (200 mL/Hr) CRRT <Continuous>  trimethoprim / sulfamethoxazole IVPB 160 milliGRAM(s) IV Intermittent daily  vasopressin Infusion 0.03 Unit(s)/Min (4.5 mL/Hr) IV Continuous <Continuous>  voriconazole IVPB 200 milliGRAM(s) IV Intermittent every 12 hours    MEDICATIONS  (PRN):  albuterol/ipratropium for Nebulization 3 milliLiter(s) Nebulizer every 6 hours PRN Shortness of Breath and/or Wheezing  FIRST- Mouthwash  BLM 10 milliLiter(s) Swish and Spit every 8 hours PRN Mouth Care  HYDROmorphone  Injectable 0.25 milliGRAM(s) IV Push every 3 hours PRN Moderate Pain (4 - 6)      PAST MEDICAL & SURGICAL HISTORY:  Diabetes      Transaminitis      Paroxysmal atrial fibrillation      Depression      BPH (benign prostatic hyperplasia)      Hypertension      Chronic atrial fibrillation      Coronary artery disease      Hepatocellular carcinoma      DM (diabetes mellitus)      HTN (hypertension)      Paroxysmal atrial fibrillation      Cirrhosis      HCC (hepatocellular carcinoma)      History of BPH      History of laparoscopic cholecystectomy      History of lumbar laminectomy      H/O prior ablation treatment      H/O percutaneous left heart catheterization          Vital Signs Last 24 Hrs  T(C): 37.3 (12 Jan 2025 11:00), Max: 37.6 (12 Jan 2025 05:00)  T(F): 99.1 (12 Jan 2025 11:00), Max: 99.7 (12 Jan 2025 05:00)  HR: 85 (12 Jan 2025 12:30) (84 - 119)  BP: 144/71 (12 Jan 2025 01:15) (80/50 - 144/71)  BP(mean): 99 (12 Jan 2025 01:15) (60 - 100)  RR: 15 (12 Jan 2025 12:30) (14 - 25)  SpO2: 97% (12 Jan 2025 12:30) (91% - 100%)    Parameters below as of 12 Jan 2025 05:44  Patient On (Oxygen Delivery Method): ventilator        I&O's Summary    11 Jan 2025 07:01  -  12 Jan 2025 07:00  --------------------------------------------------------  IN: 6008 mL / OUT: 3859 mL / NET: 2149 mL    12 Jan 2025 07:01  -  12 Jan 2025 12:49  --------------------------------------------------------  IN: 892.7 mL / OUT: 396 mL / NET: 496.7 mL                              6.9    1.99  )-----------( 20       ( 12 Jan 2025 11:43 )             20.4     01-12    138  |  103  |  25[H]  ----------------------------<  113[H]  4.0   |  19[L]  |  <0.30[L]    Ca    8.6      12 Jan 2025 11:43  Phos  2.8     01-12  Mg     2.4     01-12    TPro  4.4[L]  /  Alb  3.8  /  TBili  25.2[H]  /  DBili  x   /  AST  38  /  ALT  22  /  AlkPhos  74  01-12            ROS: Unable to assess patient is lethargic, s/p tracheostomy    PHYSICAL EXAM:   Constitutional: trach to vent, awake  Eyes:  PERRLA, Scleral icterus  ENMT: nc/at, no thrush, NGT  Neck: supple, trach   Respiratory: CTA B/L  Cardiovascular: RRR  Gastrointestinal: incision clean/dry/intact + Ostomy dusky low/no output, wound vac, peritoneal drains x1 bilious   Genitourinary: +scrotal edema w/ large fluid collection; black/green discoloration  Extremities: SCD's in place and working bilaterally, + LE edema  Neurological: trach to vent, opens eyes to voice   Skin: large sacral decub, poor healing with breakdown Transplant Surgery - Multidisciplinary Rounds  --------------------------------------------------------------  OLT   11/15/2024         Colectomy 12/7/2024     IABP 12/7 removed 12/10  Tracheostomy 12/17/2024    Present:   Patient seen and examined with multidisciplinary Transplant team including Surgeon: Dr. Dagher, Hepatologist Dr. White, JAZZ Colby/Ale/Jasmine, Fellow Dr. Garcia and bedside RN in AM rounds.   Disciplines not in attendance will be notified of the plan.     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   s/p OLT 11/15 with post op course c/b:  ·	Hypoxia: Reintubated 11/20, extubated 11/24->reintubated 11/24, extubated 11/26, reintubated 12/7, trached 12/17  ·	Ileus   ·	A fib  ·	AMS/Seizure   ·	L brachial DVT  ·	Neutropenia  ·	E coli Bacteremia (12/6)  ·	s/p Colectomy 12/7.   ·	IABP 12/7, removed 12/10  ·	Ec Faecalis UTI (12/16)  ·	Stenotrophamonas in trach aspirate (12/19)  ·	Clostridium in blood 12/23  ·	UGIB 12/26  ·	CMV Viremia  ·	MORAIMA requiring CRRT  ·	Shock liver    Interval/Overnight Events:   - afebrile, on kassandra and vaso  - 1uPlatelets, 1 uCryo, 1 uPRBC, albumin 25% x4  - CRRT net even  - Ostomy w/ minimal output    Immunosuppression:  - Cyclo HELD, MMF HELD, solumed 16  -ongoing monitoring for signs of rejection        MEDICATIONS  (STANDING):  albumin human 25% IVPB 100 milliLiter(s) IV Intermittent every 6 hours  buDESOnide    Inhalation Suspension 0.5 milliGRAM(s) Inhalation every 12 hours  chlorhexidine 0.12% Liquid 15 milliLiter(s) Oral Mucosa every 12 hours  chlorhexidine 2% Cloths 1 Application(s) Topical <User Schedule>  collagenase Ointment 1 Application(s) Topical daily  CRRT Treatment    <Continuous>  epoetin tonja (PROCRIT) Injectable 03238 Unit(s) IV Push every 7 days  ganciclovir IVPB 250 milliGRAM(s) IV Intermittent every 12 hours  imipenem/cilastatin  IVPB 500 milliGRAM(s) IV Intermittent every 6 hours  metoprolol tartrate Injectable 5 milliGRAM(s) IV Push every 6 hours  mupirocin 2% Ointment 1 Application(s) Topical every 12 hours  nystatin Powder 1 Application(s) Topical every 12 hours  pantoprazole  Injectable 40 milliGRAM(s) IV Push every 12 hours  phenylephrine    Infusion 0.2 MICROgram(s)/kG/Min (7.46 mL/Hr) IV Continuous <Continuous>  Phoxillum Filtration BK 4 / 2.5 5000 milliLiter(s) (1200 mL/Hr) CRRT <Continuous>  PrismaSATE Dialysate BGK 4 / 2.5 5000 milliLiter(s) (1500 mL/Hr) CRRT <Continuous>  PrismaSOL Filtration BGK 0 / 2.5 5000 milliLiter(s) (200 mL/Hr) CRRT <Continuous>  trimethoprim / sulfamethoxazole IVPB 160 milliGRAM(s) IV Intermittent daily  vasopressin Infusion 0.03 Unit(s)/Min (4.5 mL/Hr) IV Continuous <Continuous>  voriconazole IVPB 200 milliGRAM(s) IV Intermittent every 12 hours    MEDICATIONS  (PRN):  albuterol/ipratropium for Nebulization 3 milliLiter(s) Nebulizer every 6 hours PRN Shortness of Breath and/or Wheezing  FIRST- Mouthwash  BLM 10 milliLiter(s) Swish and Spit every 8 hours PRN Mouth Care  HYDROmorphone  Injectable 0.25 milliGRAM(s) IV Push every 3 hours PRN Moderate Pain (4 - 6)      PAST MEDICAL & SURGICAL HISTORY:  Diabetes      Transaminitis      Paroxysmal atrial fibrillation      Depression      BPH (benign prostatic hyperplasia)      Hypertension      Chronic atrial fibrillation      Coronary artery disease      Hepatocellular carcinoma      DM (diabetes mellitus)      HTN (hypertension)      Paroxysmal atrial fibrillation      Cirrhosis      HCC (hepatocellular carcinoma)      History of BPH      History of laparoscopic cholecystectomy      History of lumbar laminectomy      H/O prior ablation treatment      H/O percutaneous left heart catheterization          Vital Signs Last 24 Hrs  T(C): 37.3 (12 Jan 2025 11:00), Max: 37.6 (12 Jan 2025 05:00)  T(F): 99.1 (12 Jan 2025 11:00), Max: 99.7 (12 Jan 2025 05:00)  HR: 85 (12 Jan 2025 12:30) (84 - 119)  BP: 144/71 (12 Jan 2025 01:15) (80/50 - 144/71)  BP(mean): 99 (12 Jan 2025 01:15) (60 - 100)  RR: 15 (12 Jan 2025 12:30) (14 - 25)  SpO2: 97% (12 Jan 2025 12:30) (91% - 100%)    Parameters below as of 12 Jan 2025 05:44  Patient On (Oxygen Delivery Method): ventilator        I&O's Summary    11 Jan 2025 07:01  -  12 Jan 2025 07:00  --------------------------------------------------------  IN: 6008 mL / OUT: 3859 mL / NET: 2149 mL    12 Jan 2025 07:01  -  12 Jan 2025 12:49  --------------------------------------------------------  IN: 892.7 mL / OUT: 396 mL / NET: 496.7 mL                              6.9    1.99  )-----------( 20       ( 12 Jan 2025 11:43 )             20.4     01-12    138  |  103  |  25[H]  ----------------------------<  113[H]  4.0   |  19[L]  |  <0.30[L]    Ca    8.6      12 Jan 2025 11:43  Phos  2.8     01-12  Mg     2.4     01-12    TPro  4.4[L]  /  Alb  3.8  /  TBili  25.2[H]  /  DBili  x   /  AST  38  /  ALT  22  /  AlkPhos  74  01-12            ROS: Unable to assess patient is lethargic, s/p tracheostomy    PHYSICAL EXAM:   Constitutional: trach to vent, awake  Eyes:  PERRLA, Scleral icterus  ENMT: nc/at, no thrush, NGT  Neck: supple, trach   Respiratory: CTA B/L  Cardiovascular: RRR  Gastrointestinal: incision clean/dry/intact + Ostomy dusky low/no output, wound vac, peritoneal drains x1 bilious   Genitourinary: +scrotal edema w/ large fluid collection; black/green discoloration  Extremities: SCD's in place and working bilaterally, + LE edema  Neurological: trach to vent, opens eyes to voice   Skin: large sacral decub, poor healing with breakdown

## 2025-01-12 NOTE — PROGRESS NOTE ADULT - ASSESSMENT
ASSESSMENT: 74 male w/ a PMHx of HTN, DM, AF, BPH, MASH cirrhosis and HCC s/p OLT on 11/15. Case was uneventful but post-op course has been prolonged and c/b delirium, aspiration, multiple episodes of acute hypoxic respiratory failure requiring reintubation from 11/20-11/24 & 11/24-11/26, AF w/ RVR, ileus requiring NGT, distended colon requiring rectal tube, dysphagia, malnutrition, hypernatremia, fevers, pancytopenia, poorly controlled glucoses, and left brachial VTE. Patient went into severe refractory septic shock on 12/6 w/ blood cultures positive for E. coli s/p s/p ex lap, total abd colectomy, end ileostomy, 3 intentionally retained laparotomy pads for bleeding, Abthera, IABP placement w/ admission to CTICU, Patient required inotropes, Jacqui, and CRRT post-op. s/p closure 12/9. IABP removed 12/10 and transferred back to SICU 12/13. SICU course c/b narrow complex arrythmia w/ ECG showing new ST elevations concerning for posterior wall MI vs vasospasms, cards consulted and started on heparin gtt for empiric ACS tx which was c/b GIB then transitioned to argatroban c/b bleed. TTE revealed LVOT obstruction & TODD.    PLAN:    Neuro:  - Pain: PRN dilaudid  - holding Seroquel and melatonin d/t poor mental status  - Opens eyes intermittently, intermittently following commands, off sedation  - CT head 11/19, 11/21, 11/25, 11/29, 12/5, 12/20, 1/11 negative  - EEG: Mild diffuse cerebral dysfunction that is not specific in etiology. No epileptic discharges recorded. No seizures recorded.  - Holding home xanax, Abilify, Zoloft, Remeron, Lexapro  - No need for MRI    Resp:  - S/p bedside tracheostomy 12/17  - trach collar daily, CPAP overnight  - c/w chest PT  - c/w inhaled bronchodilator  - c/w suctioning PRN    CV:  - on kassandra, vaso  - holding midodrine while NPO   - IV Metoprolol 5mg q6hr  - IABP removed 12/10  - TTE 12/6 w/ LVOT obstruction & TODD  - TTE 12/11 shows EF of 55-60%    GI:  - trend LFTs  - NPO, NGT  - all anti-motility agents held  - protonix BID  - monitor ALYSSA output  - CT 1/11: no obstruction, enteritis, foci of air concerning for early SBO    /Renal:  - started 100cc 25% albumin q6 for 2 days  - CRRT net even  - Monitor BUN/Cr, I&Os, trend UOP  - Monitor scrotal necrosis, maintain nichols at all times  - Bladder scan q12    Heme:  - trend H/H, PLTs, coags  - hep gtt and argatroban gtt held i/s/o bleed  - PLEX x4, last session 1/9    ID:  - ABX: Voriconazole, imepenem   - transplant ppx w/ bactrim  - Ganciclovir for CMV   - holding immunosuppresion 2/2 cavitary lesion on CT chest  - Tracheal aspirate -> Stenotrophomas maltophilia  - UCx 12/16 positive, Combi cath 12/19 positive  - BCx positive for clostridium paraputrificum 12/28  - Mouthwash for mouth ulcers  - C diff and GI stool PCR negative  - ID->anticipate 14 total days antibiotics    Endo:  - monitor glucose   - methylprednisone 32 qd  - ISS q6 hours    Lines:   - NGT   - ALYSSA  - RIJ trialysis    Code Status: full code    Disposition: BON Taveras PA-C     Please call [phone] after 6am

## 2025-01-12 NOTE — PROGRESS NOTE ADULT - ASSESSMENT
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 Post-op course c/b worsening mental status and acute hypoxic respiratory failure requiring multiple intubations/extubations, currently extubated (as of 11/26/24) and colonic ileus s/p several rounds of Relistor and Neostigmine with NGT and rectal tube currently in place now with septic shock due to ischemic bowel s/p total colectomy with ostomy creation with MORAIMA on CRRT.      1. s/p OLT 11/15/24 with oliguric MORAIMA in setting of septic shock and poor liver function.   - CT AP non-con: Bilateral renal cysts including cysts with peripheral calcification in the left kidney.  - Initiated on CRRT 12/7/24- 12/15/24 at 1AM stopped. s/p IHD 12/18/24 however required levophed.   - Has been back on CRRT but stopped 1/7/25 due to catheter malfunction. Restarted 1/8/25 at 8PM with new catheter.   - Pt anuric and hypotensive, continue CRRT.

## 2025-01-12 NOTE — PROGRESS NOTE ADULT - ASSESSMENT
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   s/p OLT 11/15 with complicated post op course    [] Septic shock/Cardiogenic shock  [] s/p Colectomy 12/7   [] RTOR for closure and Ileostomy creation   [] IAPB 12/7- removed 12/10  -> E Coli Bacteremia 12/6  -> Ec faecalis UTI (12/16)  -> Stenotrophaonas in trach apirate (12/19)  -> Ec Faecalis in Asc fluid (12/20) (12/26)  -> Clostridium paraputrifucum in blood 12/23, cultures have since cleared  - Tx ID following - on Imipenem/Flagyl/Bactrim DS    - 1/1 CT CAP: small pleural effusions, enteritis, rest ok  - all lines changed over 1/3  - Wound care for sacral decubitus ulcer  - midodrine inc 20 q8h, on levo and vaso  - New blood and urine Cx ordered 1/11  - CT chest/abd/pelvis today showed apical cavitating lesions likely fungal ID switched Caspo to Voriconazole, possible bronchoscope for aspiration culture    [] MORAIMA:   -CRRT started 12/7, stopped 12/15, resumed CRRT 12/23  - still anuric     [ ] UGIB s/p EGD (12/26) showing generalized oozing, coagulopathy  - Correct coagulopathy per SICU  - Protonix IV BID  - TF at goal    [] s/p OLT  - poor graft function, trial of plasmapheresis 1/3, 1/5, 1/7 for (3-5 days), PLEX #4 1/9,   - IVIG #1 on 1/8, #2 1/9   - PLEX and IVIG held today in setting of hypotension 1/12  - repeat liver US unchanged  - Diet: TFs to goal  - Pain management: avoid narcotics  - Strict I&Os, wound vac placed 12/10   - US: L brachial DVT (holding A/C)  - wound vac care  - ursodiol 300mgBID     [ ] Immunosuppression  - Tacrolimus stopped 11/18 in setting of AMS/Seizure, Started Cyclo 12/17  - Cyclo HELD for Voriconazole, MMF HELD, Solumedrol 32 mg daily    [] lleus   -@ home on Linzess  - imaging with distended colon, improving, (likely opioid induced)  - s/p Neostigmine x 2  - s/p Relistor, last dose 12/1  - s/p colectomy with end ileostomy  (see above)  - ileostomy with >1L output, lomotil and imodium as needed, and change tube feeds  - Colorectal following  - replace losses as able  - Ileostomy output down to 150 mL/day. HOLD antimotility drugs, digitized (fascia wnl)    [] CMV viremia  - CMV PCR (12/11 and 12/16): neg, positive CMV PCR on 12/23  - CMV viral load incr from 537 to 1240 1/6; ganciclovir inc to BID, next check 1/13      [ ] AMS  - Reintubated 11/20 Aspiration/hypoxia, extubated 11/24->[id number]--> extubated 11/26 -> [id number] -> tracheostomy 12/17 -> trach collar trials starting 12/29  - EEG 11/30 negative, Neurology following  - BH following   - off tacro  - on keppra  -CT Head No Cont (12/20): Age-indeterminate posterior left frontal lobe infarct.   -CT Head (12/25): Evolving subacute infarct in the left supramarginal gyrus  -repeat CTH ok, poor MS   - melatonin QHS   - Seroquel 25mg QD per psych    [ ] HTN/ pAFib  [ ] coronary vasospasm vs posterior wall MI  - remains off all a/c, failed hep gtt/argatroban due to UGIB  - Cards- plan for PCI prior to DC   - Off Amiodarone, off dobutamine  - BB as needed  - Dr. 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   s/p OLT 11/15 with complicated post op course    [] Septic shock/Cardiogenic shock  [] s/p Colectomy 12/7   [] RTOR for closure and Ileostomy creation   [] IAPB 12/7- removed 12/10  -> E Coli Bacteremia 12/6  -> Ec faecalis UTI (12/16)  -> Stenotrophaonas in trach apirate (12/19)  -> Ec Faecalis in Asc fluid (12/20) (12/26)  -> Clostridium paraputrifucum in blood 12/23, cultures have since cleared  - Tx ID following - on Imipenem/Flagyl/Bactrim DS    - 1/1 CT CAP: small pleural effusions, enteritis, rest ok  - all lines changed over 1/3  - Wound care for sacral decubitus ulcer  - on kassandra and vaso  - f/u blood and urine Cx ordered 1/11  - CT chest/abd/pelvis 1/11 showed apical cavitating lesions likely fungal ID switched Caspo to Voriconazole, possible bronch for cultures    [] MORAIMA:   -CRRT started 12/7, stopped 12/15, resumed CRRT 12/23  - still anuric     [ ] UGIB s/p EGD (12/26) showing generalized oozing, coagulopathy  - Correct coagulopathy per SICU  - Protonix IV BID  - TF at goal    [] s/p OLT  - poor graft function, trial of plasmapheresis 1/3, 1/5, 1/7 for (3-5 days), PLEX #4 1/9,   - IVIG #1 on 1/8, #2 1/9   - PLEX and IVIG held 1/11 +1/12  - repeat liver US unchanged  - Diet: TFs to goal  - Pain management: avoid narcotics  - Strict I&Os, wound vac placed 12/10   - US: L brachial DVT (holding A/C)  - wound vac care  - ursodiol 300mgBID     [ ] Immunosuppression  - Tacrolimus stopped 11/18 in setting of AMS/Seizure, Started Cyclo 12/17  - Cyclo HELD for Voriconazole, MMF HELD, Solumedrol 32 mg daily    [] lleus   -@ home on Linzess  - imaging with distended colon, improving, (likely opioid induced)  - s/p Neostigmine x 2  - s/p Relistor, last dose 12/1  - s/p colectomy with end ileostomy  (see above)  - ileostomy with >1L output, lomotil and imodium as needed, and change tube feeds  - Colorectal following  - replace losses as able  - Ileostomy output down to 150 mL/day. HOLD antimotility drugs, digitized (fascia wnl)    [] CMV viremia  - CMV PCR (12/11 and 12/16): neg, positive CMV PCR on 12/23  - CMV viral load incr from 537 to 1240 1/6; ganciclovir inc to BID, next check 1/13      [ ] AMS  - Reintubated 11/20 Aspiration/hypoxia, extubated 11/24->[id number]/24--> extubated 11/26 -> [id number]/7 -> tracheostomy 12/17 -> trach collar trials starting 12/29  - EEG 11/30 negative, Neurology following  - BH following   - off tacro  - on keppra  -CT Head No Cont (12/20): Age-indeterminate posterior left frontal lobe infarct.   -CT Head (12/25): Evolving subacute infarct in the left supramarginal gyrus  -repeat CTH ok, poor MS   - melatonin QHS   - Seroquel 25mg QD per psych    [ ] HTN/ pAFib  [ ] coronary vasospasm vs posterior wall MI  - remains off all a/c, failed hep gtt/argatroban due to UGIB  - Cards- plan for PCI prior to DC   - Off Amiodarone, off dobutamine  - BB as needed  - Dr. Quintanilla following    [ ] DM  - per SICU     [] Scrotal abscess   - US (12/12): 2.1 x0.9 x 3.2 cm focal, right testicle- possible abscess (12/12)  - Urology recs: CT scrotum review no debridement required  - Urology recs Derm consult for guidance on wound care   - 12/23 US doppler scrotum: no arterial flow, limited venous flow, bl hydrocele  - 12/15 CT CAP no acute changes   - Elevate scrotum w/ support Urology signed off 12/29

## 2025-01-12 NOTE — PROGRESS NOTE ADULT - SUBJECTIVE AND OBJECTIVE BOX
SURGERY DAILY PROGRESS NOTE:     SUBJECTIVE/ROS: Patient seen and examined. Unable to obtain ROS. Discussed plan of care with son.      MEDICATIONS  (STANDING):  albumin human 25% IVPB 100 milliLiter(s) IV Intermittent every 6 hours  buDESOnide    Inhalation Suspension 0.5 milliGRAM(s) Inhalation every 12 hours  chlorhexidine 0.12% Liquid 15 milliLiter(s) Oral Mucosa every 12 hours  chlorhexidine 2% Cloths 1 Application(s) Topical <User Schedule>  collagenase Ointment 1 Application(s) Topical daily  CRRT Treatment    <Continuous>  epoetin tonja (PROCRIT) Injectable 68286 Unit(s) IV Push every 7 days  ganciclovir IVPB 250 milliGRAM(s) IV Intermittent every 12 hours  imipenem/cilastatin  IVPB 500 milliGRAM(s) IV Intermittent every 6 hours  insulin lispro (ADMELOG) corrective regimen sliding scale   SubCutaneous every 6 hours  methylPREDNISolone sodium succinate Injectable 32 milliGRAM(s) IV Push <User Schedule>  metoprolol tartrate Injectable 5 milliGRAM(s) IV Push every 6 hours  mupirocin 2% Ointment 1 Application(s) Topical every 12 hours  nystatin Powder 1 Application(s) Topical every 12 hours  pantoprazole  Injectable 40 milliGRAM(s) IV Push every 12 hours  phenylephrine    Infusion 0.2 MICROgram(s)/kG/Min (7.46 mL/Hr) IV Continuous <Continuous>  Phoxillum Filtration BK 4 / 2.5 5000 milliLiter(s) (1200 mL/Hr) CRRT <Continuous>  PrismaSATE Dialysate BGK 4 / 2.5 5000 milliLiter(s) (1500 mL/Hr) CRRT <Continuous>  PrismaSOL Filtration BGK 0 / 2.5 5000 milliLiter(s) (200 mL/Hr) CRRT <Continuous>  trimethoprim / sulfamethoxazole IVPB 160 milliGRAM(s) IV Intermittent daily  vasopressin Infusion 0.03 Unit(s)/Min (4.5 mL/Hr) IV Continuous <Continuous>  voriconazole IVPB 200 milliGRAM(s) IV Intermittent every 12 hours    MEDICATIONS  (PRN):  albuterol/ipratropium for Nebulization 3 milliLiter(s) Nebulizer every 6 hours PRN Shortness of Breath and/or Wheezing  FIRST- Mouthwash  BLM 10 milliLiter(s) Swish and Spit every 8 hours PRN Mouth Care  HYDROmorphone  Injectable 0.25 milliGRAM(s) IV Push every 3 hours PRN Moderate Pain (4 - 6)  HYDROmorphone  Injectable 0.5 milliGRAM(s) IV Push every 3 hours PRN Severe Pain (7 - 10)      OBJECTIVE:  Vital Signs Last 24 Hrs  T(C): 37.3 (12 Jan 2025 07:00), Max: 37.6 (12 Jan 2025 05:00)  T(F): 99.1 (12 Jan 2025 07:00), Max: 99.7 (12 Jan 2025 05:00)  HR: 90 (12 Jan 2025 09:00) (90 - 119)  BP: 144/71 (12 Jan 2025 01:15) (80/50 - 144/71)  BP(mean): 99 (12 Jan 2025 01:15) (60 - 100)  RR: 25 (12 Jan 2025 09:00) (15 - 28)  SpO2: 98% (12 Jan 2025 09:00) (90% - 100%)    Parameters below as of 12 Jan 2025 05:44  Patient On (Oxygen Delivery Method): ventilator      I&O's Detail    11 Jan 2025 07:01  -  12 Jan 2025 07:00  --------------------------------------------------------  IN:    Albumin 25%  -  50 mL: 200 mL    Albumin 5%  - 250 mL: 250 mL    Cryoprecipitate: 150 mL    Enteral Tube Flush: 100 mL    IV PiggyBack: 1300 mL    Phenylephrine: 2974.5 mL    Platelets - Single Donor: 675 mL    PRBCs (Packed Red Blood Cells): 300 mL    Vasopressin: 58.5 mL  Total IN: 6008 mL    OUT:    Bulb (mL): 375 mL    Ileostomy (mL): 50 mL    Indwelling Catheter - Urethral (mL): 5 mL    Nasogastric/Oral tube (mL): 850 mL    Other (mL): 2579 mL  Total OUT: 3859 mL    Total NET: 2149 mL      12 Jan 2025 07:01  -  12 Jan 2025 09:15  --------------------------------------------------------  IN:    IV PiggyBack: 250 mL    Phenylephrine: 123.1 mL    Vasopressin: 9 mL  Total IN: 382.1 mL    OUT:    Other (mL): 198 mL  Total OUT: 198 mL  Total NET: 184.1 mL        LABS:                        6.6    2.82  )-----------( 48       ( 12 Jan 2025 06:31 )             19.8     01-12    136  |  103  |  27[H]  ----------------------------<  105[H]  4.0   |  18[L]  |  <0.30[L]    Ca    9.2      12 Jan 2025 06:31  Phos  2.9     01-12  Mg     2.4     01-12    TPro  4.6[L]  /  Alb  3.8  /  TBili  26.1[H]  /  DBili  x   /  AST  45[H]  /  ALT  26  /  AlkPhos  82  01-12    PT/INR - ( 12 Jan 2025 06:31 )   PT: 31.3 sec;   INR: 2.75 ratio    PTT - ( 12 Jan 2025 06:31 )  PTT:59.9 sec    Urinalysis Basic - ( 12 Jan 2025 06:31 )  Color: x / Appearance: x / SG: x / pH: x  Gluc: 105 mg/dL / Ketone: x  / Bili: x / Urobili: x   Blood: x / Protein: x / Nitrite: x   Leuk Esterase: x / RBC: x / WBC x   Sq Epi: x / Non Sq Epi: x / Bacteria: x    Physical Exam:  General Appearance: ill-appearing, jaundice  Respiratory: trach, ventilated full support  Abdomen: distended, ileostomy edematous without any significant output, midline incision with packing, ALYSSA x 1 w s/s output

## 2025-01-13 NOTE — PROGRESS NOTE ADULT - ASSESSMENT
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   s/p OLT 11/15 with complicated post op course    [] Septic shock/Cardiogenic shock  [] s/p Colectomy 12/7   [] RTOR for closure and Ileostomy creation   [] IAPB 12/7- removed 12/10  -> E Coli Bacteremia 12/6  -> Ec faecalis UTI (12/16)  -> Stenotrophaonas in trach apirate (12/19)  -> Ec Faecalis in Asc fluid (12/20) (12/26)  -> Clostridium paraputrifucum in blood 12/23, cultures have since cleared  - Tx ID following - on Imipenem/Flagyl/Bactrim DS    - 1/1 CT CAP: small pleural effusions, enteritis, rest ok  - all lines changed over 1/3  - Wound care for sacral decubitus ulcer  - on kassandra and vaso  - f/u blood and urine Cx ordered 1/11  - CT chest/abd/pelvis 1/11 showed apical cavitating lesions likely fungal ID switched Caspo to Voriconazole, possible bronch for cultures    [] MORAIMA:   -CRRT started 12/7, stopped 12/15, resumed CRRT 12/23  - still anuric     [ ] UGIB s/p EGD (12/26) showing generalized oozing, coagulopathy  - Correct coagulopathy per SICU  - Protonix IV BID  - TF at goal    [] s/p OLT  - poor graft function, trial of plasmapheresis 1/3, 1/5, 1/7 for (3-5 days), PLEX #4 1/9,   - IVIG #1 on 1/8, #2 1/9   - PLEX and IVIG held 1/11 +1/12  - repeat liver US unchanged  - Diet: TFs to goal  - Pain management: avoid narcotics  - Strict I&Os, wound vac placed 12/10   - US: L brachial DVT (holding A/C)  - wound vac care  - ursodiol 300mgBID     [ ] Immunosuppression  - Tacrolimus stopped 11/18 in setting of AMS/Seizure, Started Cyclo 12/17  - Cyclo HELD for Voriconazole, MMF HELD, Solumedrol 32 mg daily    [] lleus   -@ home on Linzess  - imaging with distended colon, improving, (likely opioid induced)  - s/p Neostigmine x 2  - s/p Relistor, last dose 12/1  - s/p colectomy with end ileostomy  (see above)  - ileostomy with >1L output, lomotil and imodium as needed, and change tube feeds  - Colorectal following  - replace losses as able  - Ileostomy output down to 150 mL/day. HOLD antimotility drugs, digitized (fascia wnl)    [] CMV viremia  - CMV PCR (12/11 and 12/16): neg, positive CMV PCR on 12/23  - CMV viral load incr from 537 to 1240 1/6; ganciclovir inc to BID, next check 1/13      [ ] AMS  - Reintubated 11/20 Aspiration/hypoxia, extubated 11/24->[id number]/24--> extubated 11/26 -> [id number]/7 -> tracheostomy 12/17 -> trach collar trials starting 12/29  - EEG 11/30 negative, Neurology following  - BH following   - off tacro  - on keppra  -CT Head No Cont (12/20): Age-indeterminate posterior left frontal lobe infarct.   -CT Head (12/25): Evolving subacute infarct in the left supramarginal gyrus  -repeat CTH ok, poor MS   - melatonin QHS   - Seroquel 25mg QD per psych    [ ] HTN/ pAFib  [ ] coronary vasospasm vs posterior wall MI  - remains off all a/c, failed hep gtt/argatroban due to UGIB  - Cards- plan for PCI prior to DC   - Off Amiodarone, off dobutamine  - BB as needed  - Dr. 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   s/p OLT 11/15 with complicated post op course    [] Septic shock/Cardiogenic shock  [] s/p Colectomy 12/7   [] RTOR for closure and Ileostomy creation   [] IAPB 12/7- removed 12/10  -> E Coli Bacteremia 12/6  -> Ec faecalis UTI (12/16)  -> Stenotrophaonas in trach apirate (12/19)  -> Ec Faecalis in Asc fluid (12/20) (12/26)  -> Clostridium paraputrifucum in blood 12/23, cultures have since cleared  - Tx ID following - on Imipenem/Bactrim DS/Voriconazole  - 1/1 CT CAP: small pleural effusions, enteritis, rest ok  - all lines changed over 1/3  - Wound care for sacral decubitus ulcer  - on kassandra and vaso  - f/u blood and urine Cx ordered 1/11  - CT chest/abd/pelvis 1/11 showed apical cavitating lesions likely fungal ID switched Caspo to Voriconazole  - Blood Cx grew Stenotrophaonas    [] MORAIMA:   -CRRT started 12/7, stopped 12/15, resumed CRRT 12/23  - still anuric     [ ] UGIB s/p EGD (12/26) showing generalized oozing, coagulopathy  - Correct coagulopathy per SICU  - Protonix IV BID  - TF at goal    [] s/p OLT  - poor graft function, trial of plasmapheresis 1/3, 1/5, 1/7 for (3-5 days), PLEX #4 1/9,   - IVIG #1 on 1/8, #2 1/9   - PLEX and IVIG held 1/11 +1/12  - repeat liver US unchanged  - Diet: TFs to goal  - Pain management: avoid narcotics  - Strict I&Os, wound vac placed 12/10   - US: L brachial DVT (holding A/C)  - wound vac care  - ursodiol 300mgBID     [ ] Immunosuppression  - Tacrolimus stopped 11/18 in setting of AMS/Seizure, Started Cyclo 12/17  - Cyclo HELD for Voriconazole, MMF HELD, Solumedrol 32 mg daily    [] lleus   -@ home on Linzess  - imaging with distended colon, improving, (likely opioid induced)  - s/p Neostigmine x 2  - s/p Relistor, last dose 12/1  - s/p colectomy with end ileostomy  (see above)  - ileostomy with >1L output, lomotil and imodium as needed, and change tube feeds  - Colorectal following  - replace losses as able  - Ileostomy output down to 150 mL/day. HOLD antimotility drugs, digitized (fascia wnl)    [] CMV viremia  - CMV PCR (12/11 and 12/16): neg, positive CMV PCR on 12/23  - CMV viral load incr from 537 to 1240 1/6, at 1280 on 1/13, ganciclovir 250 BID      [ ] AMS  - Reintubated 11/20 Aspiration/hypoxia, extubated 11/24->[id number]/24--> extubated 11/26 -> [id number]/7 -> tracheostomy 12/17 -> trach collar trials starting 12/29  - EEG 11/30 negative, Neurology following  - BH following   - off tacro  - on keppra  -CT Head No Cont (12/20): Age-indeterminate posterior left frontal lobe infarct.   -CT Head (12/25): Evolving subacute infarct in the left supramarginal gyrus  -repeat CTH ok, poor MS   - melatonin QHS   - Seroquel 25mg QD per psych    [ ] HTN/ pAFib  [ ] coronary vasospasm vs posterior wall MI  - remains off all a/c, failed hep gtt/argatroban due to UGIB  - Cards- plan for PCI prior to DC   - Off Amiodarone, off dobutamine  - BB as needed  - Dr. Quintanilla following    [ ] DM  - per SICU     [] Scrotal abscess   - US (12/12): 2.1 x0.9 x 3.2 cm focal, right testicle- possible abscess (12/12)  - Urology recs: CT scrotum review no debridement required  - Urology recs Derm consult for guidance on wound care   - 12/23 US doppler scrotum: no arterial flow, limited venous flow, bl hydrocele  - 12/15 CT CAP no acute changes   - Elevate scrotum w/ support Urology signed off 12/29

## 2025-01-13 NOTE — PROGRESS NOTE ADULT - NS ATTEND AMEND GEN_ALL_CORE FT
worsening sepsis on 2 pressors  cont abx and voriconazole for left lung cavitary lesion  Immunosuppression - Cyclosporine and MMF held. Cont solumedrol 16mg daily  start trickle feeds via NGT

## 2025-01-13 NOTE — PROGRESS NOTE ADULT - ASSESSMENT
74 severely ill male s/p OLT 11/2024 w prolonged hospital course requiring total abdominal colectomy and end ileostomy and eventual tracheostomy w prolonged SICU stay for continued hemodynamic instability and on-going possible sepsis vs liver failure. Colorectal surgery initially consulted for colectomy assistance and then high ileostomy output. Now, reconsulted for decreased output concerning for obstruction. Patient with worsening hemodynamic status requiring increased amounts of vasopressors. CT performed yesterday.       Plan:  -Patient is a very poor surgical candidate. Would not tolerate an exploration. No plans for surgical intervention at this time  -Appreciate Palliative care consult and GOC.   -Appreciate SICU care

## 2025-01-13 NOTE — PROGRESS NOTE ADULT - SUBJECTIVE AND OBJECTIVE BOX
HPI:  Patient seen and examined at bedside in SICU.  Trach collar.  Multiple pressors - midodrine, phenylephrine, and vasopressin    Review Of Systems:   Unable to assess        Medications:  albumin human 25% IVPB 50 milliLiter(s) IV Intermittent every 8 hours  albuterol/ipratropium for Nebulization 3 milliLiter(s) Nebulizer every 6 hours PRN  buDESOnide    Inhalation Suspension 0.5 milliGRAM(s) Inhalation every 12 hours  chlorhexidine 0.12% Liquid 15 milliLiter(s) Oral Mucosa every 12 hours  chlorhexidine 2% Cloths 1 Application(s) Topical <User Schedule>  collagenase Ointment 1 Application(s) Topical daily  CRRT Treatment    <Continuous>  epoetin tonja (PROCRIT) Injectable 09687 Unit(s) IV Push every 7 days  FIRST- Mouthwash  BLM 10 milliLiter(s) Swish and Spit every 8 hours PRN  ganciclovir IVPB 250 milliGRAM(s) IV Intermittent every 12 hours  HYDROmorphone  Injectable 0.5 milliGRAM(s) IV Push once  HYDROmorphone  Injectable 0.25 milliGRAM(s) IV Push every 3 hours PRN  imipenem/cilastatin  IVPB 500 milliGRAM(s) IV Intermittent every 6 hours  methylPREDNISolone sodium succinate Injectable 16 milliGRAM(s) IV Push daily  metoprolol tartrate Injectable 5 milliGRAM(s) IV Push every 6 hours  mupirocin 2% Ointment 1 Application(s) Topical every 12 hours  nystatin Powder 1 Application(s) Topical every 12 hours  pantoprazole  Injectable 40 milliGRAM(s) IV Push every 12 hours  phenylephrine    Infusion 0.2 MICROgram(s)/kG/Min IV Continuous <Continuous>  Phoxillum Filtration BK 4 / 2.5 5000 milliLiter(s) CRRT <Continuous>  PrismaSATE Dialysate BGK 4 / 2.5 5000 milliLiter(s) CRRT <Continuous>  PrismaSOL Filtration BGK 0 / 2.5 5000 milliLiter(s) CRRT <Continuous>  trimethoprim / sulfamethoxazole IVPB 300 milliGRAM(s) IV Intermittent every 8 hours  vasopressin Infusion 0.03 Unit(s)/Min IV Continuous <Continuous>  voriconazole IVPB 200 milliGRAM(s) IV Intermittent every 12 hours    PAST MEDICAL & SURGICAL HISTORY:  Diabetes      Transaminitis      Paroxysmal atrial fibrillation      Depression      BPH (benign prostatic hyperplasia)      Hypertension      Chronic atrial fibrillation      Coronary artery disease      Hepatocellular carcinoma      DM (diabetes mellitus)      HTN (hypertension)      Paroxysmal atrial fibrillation      Cirrhosis      HCC (hepatocellular carcinoma)      History of BPH      History of laparoscopic cholecystectomy      History of lumbar laminectomy      H/O prior ablation treatment      H/O percutaneous left heart catheterization        Vitals:  T(C): 36.5 (01-13-25 @ 19:00), Max: 37 (01-13-25 @ 05:00)  HR: 91 (01-13-25 @ 21:47) (79 - 133)  BP: --  BP(mean): --  RR: 18 (01-13-25 @ 21:15) (14 - 24)  SpO2: 96% (01-13-25 @ 21:47) (91% - 100%)  Wt(kg): --  Daily     Daily   I&O's Summary    12 Jan 2025 07:01  -  13 Jan 2025 07:00  --------------------------------------------------------  IN: 3203.7 mL / OUT: 2881 mL / NET: 322.7 mL    13 Jan 2025 07:01  -  13 Jan 2025 23:24  --------------------------------------------------------  IN: 1455.8 mL / OUT: 1784 mL / NET: -328.2 mL        Physical Exam:  Appearance: Critically ill  Eyes: PERRL, EOMI, pink conjunctiva  HENT: +Trach, +NGT  Cardiovascular: RRR, S1, S2  Respiratory: Clear to auscultation bilaterally  Gastrointestinal: soft, non-tender, non-distended with normal bowel sounds  Musculoskeletal: No clubbing; no joint deformity   Neurologic: Non-focal  Skin: sacral decubitus ulcer and scrotal necrosis noted                            9.2    2.19  )-----------( 27       ( 13 Jan 2025 17:51 )             26.9     01-13    137  |  103  |  20  ----------------------------<  136[H]  4.0   |  19[L]  |  <0.30[L]    Ca    8.9      13 Jan 2025 17:51  Phos  2.9     01-13  Mg     2.4     01-13    TPro  4.2[L]  /  Alb  3.4  /  TBili  27.0[H]  /  DBili  x   /  AST  53[H]  /  ALT  24  /  AlkPhos  99  01-13    PT/INR - ( 13 Jan 2025 17:51 )   PT: 30.1 sec;   INR: 2.64 ratio         PTT - ( 13 Jan 2025 17:51 )  PTT:65.5 sec        Cardiovascular Diagnostic Testing:  ECG: sinus rhythm    Echo: < from: Intra-Operative Transesophageal Echo W or WO Ultrasound Enhancing Agent (11.15.24 @ 07:46) >  --------------------------------------------------------------------------------  PRE-BYPASS FINDINGS     Left Ventricle:  Left ventricular ejection fraction is estimated at 60 to 65%. Normal left ventricularwall thickness. The left ventricular systolic function is normal There are no regional wall motion abnormalities seen.     Right Ventricle:  The right ventricular cavity is normal in size, with normal wall thickness and right ventricular systolic function is normal. Right ventricular systolic function is normal.     Left Atrium:  The left atrium is normal in size. No thrombus visualized in left atrial appendage.     Right Atrium:  The right atrium is normal in size. The right atrium is normal in size.     Interatrial Septum:  No PFO visualized with color flow doppler.     Aortic Valve:  The aortic valve appears trileaflet. There is no aortic valve stenosis. There is trace aortic regurgitation.     Mitral Valve:  There is no mitral valve stenosis. There is no mitral valve stenosis. There is trace mitral regurgitation.     Tricuspid Valve:  There is no evidence of tricuspid stenosis. There is trace tricuspid regurgitation.     Pericardium:  No pericardial effusion seen.     Post-Bypass:  S/P OLT. Under current loading conditions, hyperdynamic left ventricular systolic function, EF: 70-75% by visual estimate, no RWMA. Normal right ventricular systolic function. All other findings unchanged. Probe removed atraumatically, no blood. The patient tolerated the procedure well and without complications. Permanent recorded images are stored in the medical record.     Electronically signed by James Villareal on 11/15/2024 at 2:18:16 PM         *** Final ***    < end of copied text >      < from: TTE Limited W or WO Ultrasound Enhancing Agent (12.11.24 @ 09:28) >  _______________________________________________________________________________________     CONCLUSIONS:      1. Endocardial visualization enhanced with Definity (Ultrasound Enhancing Agent).   2. Left ventricular systolic function is normal with an ejection fraction visually estimated at 55 to 60 %.   3. Enlarged right ventricular cavity size and mildly reduced right ventricular systolic function.   4. Device lead is visualized in the right heart.    ________________________________________________________________________________________    < end of copied text >      Stress Testing:     Cath: 4/24/2024, patient had his LHC repeated with Ben Villareal MD at [facility].  Cath showed multivessel coronary artery disease, but the lesions previously noted were FFR negative.  He continues to have MIRTA 3 flow throughout.    Interpretation of Telemetry: Sinus     Imaging:

## 2025-01-13 NOTE — PROGRESS NOTE ADULT - SUBJECTIVE AND OBJECTIVE BOX
patient is on CRRT for persistent MORAIMA, post liver transplant    MEDICATIONS  (STANDING):  albumin human 25% IVPB 50 milliLiter(s) IV Intermittent every 8 hours  buDESOnide    Inhalation Suspension 0.5 milliGRAM(s) Inhalation every 12 hours  chlorhexidine 0.12% Liquid 15 milliLiter(s) Oral Mucosa every 12 hours  chlorhexidine 2% Cloths 1 Application(s) Topical <User Schedule>  collagenase Ointment 1 Application(s) Topical daily  CRRT Treatment    <Continuous>  epoetin tonja (PROCRIT) Injectable 22904 Unit(s) IV Push every 7 days  ganciclovir IVPB 250 milliGRAM(s) IV Intermittent every 12 hours  HYDROmorphone  Injectable 0.5 milliGRAM(s) IV Push once  imipenem/cilastatin  IVPB 500 milliGRAM(s) IV Intermittent every 6 hours  methylPREDNISolone sodium succinate Injectable 16 milliGRAM(s) IV Push daily  metoprolol tartrate Injectable 5 milliGRAM(s) IV Push every 6 hours  mupirocin 2% Ointment 1 Application(s) Topical every 12 hours  nystatin Powder 1 Application(s) Topical every 12 hours  pantoprazole  Injectable 40 milliGRAM(s) IV Push every 12 hours  phenylephrine    Infusion 0.2 MICROgram(s)/kG/Min (7.46 mL/Hr) IV Continuous <Continuous>  Phoxillum Filtration BK 4 / 2.5 5000 milliLiter(s) (1200 mL/Hr) CRRT <Continuous>  PrismaSATE Dialysate BGK 4 / 2.5 5000 milliLiter(s) (1500 mL/Hr) CRRT <Continuous>  PrismaSOL Filtration BGK 0 / 2.5 5000 milliLiter(s) (200 mL/Hr) CRRT <Continuous>  trimethoprim / sulfamethoxazole IVPB 390 milliGRAM(s) IV Intermittent every 12 hours  vasopressin Infusion 0.03 Unit(s)/Min (4.5 mL/Hr) IV Continuous <Continuous>  voriconazole IVPB 200 milliGRAM(s) IV Intermittent every 12 hours    MEDICATIONS  (PRN):  albuterol/ipratropium for Nebulization 3 milliLiter(s) Nebulizer every 6 hours PRN Shortness of Breath and/or Wheezing  FIRST- Mouthwash  BLM 10 milliLiter(s) Swish and Spit every 8 hours PRN Mouth Care  HYDROmorphone  Injectable 0.25 milliGRAM(s) IV Push every 3 hours PRN Moderate Pain (4 - 6)    Physical findings reviewed as documented by SICU team.    Vital Signs Last 24 Hrs  T(C): 36.8 (01-13-25 @ 07:00)  T(F): 98.2 (01-13-25 @ 07:00), Max: 98.8 (01-12-25 @ 22:00)  HR: 86 (01-13-25 @ 12:30) (79 - 133)  BP: --  BP(mean): --  RR: 20 (01-13-25 @ 12:30) (14 - 23)  SpO2: 94% (01-13-25 @ 12:30) (91% - 99%)  Wt(kg): --    01-12 @ 07:01  -  01-13 @ 07:00  --------------------------------------------------------  IN: 3203.7 mL / OUT: 2881 mL / NET: 322.7 mL    01-13 @ 07:01  -  01-13 @ 13:16  --------------------------------------------------------  IN: 634.3 mL / OUT: 427 mL / NET: 207.3 mL      LABS/STUDIES  --------------------------------------------------------------------------------              9.6    2.66  >-----------<  19       [01-13-25 @ 12:05]              27.8     137  |  102  |  21  ----------------------------<  148      [01-13-25 @ 12:05]  4.1   |  18  |  <0.30        Ca     8.8     [01-13-25 @ 12:05]      Mg     2.4     [01-13-25 @ 12:05]      Phos  2.9     [01-13-25 @ 12:05]    TPro  4.3  /  Alb  3.4  /  TBili  27.5  /  DBili  x   /  AST  41  /  ALT  25  /  AlkPhos  94  [01-13-25 @ 12:05]    PT/INR: PT 35.9 , INR 3.19       [01-13-25 @ 06:20]  PTT: 64.0       [01-13-25 @ 06:20]      Creatinine Trend:  SCr <0.30 [01-13 @ 12:05]  SCr <0.30 [01-13 @ 06:20]  SCr <0.30 [01-13 @ 00:46]  SCr <0.30 [01-12 @ 17:45]  SCr <0.30 [01-12 @ 11:43]

## 2025-01-13 NOTE — PROGRESS NOTE ADULT - ASSESSMENT
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 Post op course complicated by acute hypoxic respiratory failure requiring intubation multiple times, most recently on 12/7 with conversion to tracheostomy on 12/17, e.coli bacteremia with RTOR on 12/7 for concern for worsening septic shock and cardiogenic shock s/p balloon pump placement and total abdominal colectomy in setting of ischemic bowel s/p OR on 12/9 for ileostomy creation, and olirugirc MORAIMA requiring CRRT and now intermittent HD.    Diagnosed with pneumonia secondary to Stenotrophomonas    Also with growth of Enterococcus faecalis from abdominal drains and urine culture    More fever and shock event on 12/29/24 likely 2/2 GIB    UA (12/19) 2 WBC  BCx (12/23) Clostridium paraputrificum  Bronch Cx (12/23) NGTD    CT Chest (12/23) Bibasilar groundglass and tree in bud opacities which may represent distal airway impaction versus pneumonia.    CT A/P (12/23) Peripheral wedge-shaped areas of hypoattenuation involving the superior aspect of the spleen, suggestive of splenic infarcts (12-59). Mildly dilated loops of proximal small bowel without transition point, likely ileus.    Testicular US (12/23) No appreciable arterial flow with very limited venous flow in in the testes bilaterally. Interval decrease in diffuse scrotal edema. Small bilateral hydroceles.    CT Pelvis (12/25) Moderate diffuse subcutaneous/scrotal edema without defined collection or subcutaneous air.    BCX (12/28): Gram variable rods (Blood PCR neg)    Antibiotic Course:  Meropenem ( 11/22-11/29, 11/22-11/29, 12/16-)   Minocycline (12/21-1/1)  Bactrim (11/16-11/24, 12/8-12/11, 12/21-)  Fluconazole (11/16-12/2, 12/12-12/16)   Caspofungin ( 12/6-12/11, 12/17-)  Cefepime (12/10-12/16)   Metronidazole (12/10-12/14)   Ganciclovir (12/7-12/13)   Linezolid (11/23-11/26, 12/6-12/11, 12/28-12/29)   Atovaquone (11/29-12/6)   Zosyn (11/20-11/22,12/6)   Tobramycin (12/6)   Valganciclovir (11/6-12/4)    #Positive Blood Culture (12/23 Clostridium paraputrificum) (12/28: Gram variable rods -Blood PCR neg)  Clostridium paraputrificum is generally penicillin and metronidazole susceptible      #Leukocytosis, Fever, Shock, Transaminitis,   #Stenotrophomonas tracheo/Pneumonia? s/p minocycline course and now with steontrophomonas bacteremia  # Prior polymicrobial bacteremia E faecalis; Clostridium spp in c/o Gi pathology but also necrotic scrotum     # 1/11-12 ; requiring pressors again, poor ostomy output, new cavitary chest lesion seen on ct  sent blood cultures x2 sets and growing stenotrophomonas in one set  sent sputum from trach with mixed charity but repsiratory tract is steno source and aeration on CXR appears worse  Send fungitell  send galactomanan  added voriconazole 200mg iV q 12hr for cavitary pneumonia  send Voriconazole trough after steady state is reached  Stenotrophomonas bacteremia-  Bactrim plus Levaquin in CVVH dosing started as he has failed minocycline      -Decrease immunosuppression to as low as feasible      #Liver Transplant Recipient, Prophylactic Antibiotic  --Restarted IV ganciclovir in induction dosing adjusted for dialysis  Cytomegalovirus By PCR: Det <34.5 IU/mL (12.23.24 @ 06:15)  Cytomegalovirus By PCR: 537 IU/mL (12.31.24 @ 13:36)  Cytomegalovirus By PCR: 1240 IU/mL (01.06.25 @ 00:25)  Cytomegalovirus By PCR: 1280 IU/mL (01.12.25 @ 17:45)    Send repeat CMV viral load on 1/14 after two full weeks of therapy  Send CMV resistance testing on 1/14  may need to switch to foscarnet for suspected resistance  received a dose of IVIG for hypogammaglobulinemia    prognosis remains very poor wiht multiple infections despite minimal immunosuppressive medications       Discussed with patient's son      Wes Carrero MD  Can be called via Teams  After 5pm/weekends [phone]

## 2025-01-13 NOTE — PROGRESS NOTE ADULT - ASSESSMENT
ASSESSMENT: 74 male w/ a PMHx of HTN, DM, AF, BPH, MASH cirrhosis and HCC s/p OLT on 11/15. Case was uneventful but post-op course has been prolonged and c/b delirium, aspiration, multiple episodes of acute hypoxic respiratory failure requiring reintubation from 11/20-11/24 & 11/24-11/26, AF w/ RVR, ileus requiring NGT, distended colon requiring rectal tube, dysphagia, malnutrition, hypernatremia, fevers, pancytopenia, poorly controlled glucoses, and left brachial VTE. Patient went into severe refractory septic shock on 12/6 w/ blood cultures positive for E. coli s/p s/p ex lap, total abd colectomy, end ileostomy, 3 intentionally retained laparotomy pads for bleeding, Abthera, IABP placement w/ admission to CTICU, Patient required inotropes, Jacqui, and CRRT post-op. s/p closure 12/9. IABP removed 12/10 and transferred back to SICU 12/13. SICU course c/b narrow complex arrythmia w/ ECG showing new ST elevations concerning for posterior wall MI vs vasospasms, cards consulted and started on heparin gtt for empiric ACS tx which was c/b GIB then transitioned to argatroban c/b bleed. TTE revealed LVOT obstruction & TODD    PLAN:    Neuro:  - Pain: PRN dilaudid  - holding Seroquel and melatonin d/t poor mental status  - Opens eyes intermittently, intermittently following commands, off sedation  - CT head 11/19, 11/21, 11/25, 11/29, 12/5, 12/20, 1/11 negative  - EEG: Mild diffuse cerebral dysfunction that is not specific in etiology. No epileptic discharges recorded. No seizures recorded.  - Holding home xanax, Abilify, Zoloft, Remeron, Lexapro  - No need for MRI    Resp:  - S/p bedside tracheostomy 12/17  - trach collar daily, CPAP overnight  - c/w chest PT  - c/w inhaled bronchodilator  - c/w suctioning PRN    CV:  - on kassandra, vaso  - holding midodrine while NPO   - IV Metoprolol 5mg q6hr  - IABP removed 12/10  - TTE 12/6 w/ LVOT obstruction & TODD  - TTE 12/11 shows EF of 55-60%    GI:  - trend LFTs  - NPO, NGT  - all anti-motility agents held  - protonix BID  - monitor ALYSSA output  - CT 1/11: no obstruction, enteritis, foci of air concerning for early SBO    /Renal:  - CRRT net even  - Monitor BUN/Cr, I&Os, trend UOP  - Monitor scrotal necrosis, maintain nichols at all times  - Bladder scan q12    Heme:  - trend H/H, PLTs, coags  - hep gtt and argatroban gtt held i/s/o bleed  - PLEX x4, last session 1/9    ID:  - ABX: Voriconazole, imepenem   - transplant ppx w/ bactrim  - Ganciclovir for CMV   - holding immunosuppresion 2/2 cavitary lesion on CT chest  - Tracheal aspirate -> Stenotrophomas maltophilia  - UCx 12/16 positive, Combi cath 12/19 positive  - BCx positive for clostridium paraputrificum 12/28  - Mouthwash for mouth ulcers  - C diff and GI stool PCR negative  - ID->anticipate 14 total days antibiotics    Endo:  - monitor glucose   - methylprednisone 32 qd    Lines:   - NGT   - ALYSSA  - RIJ trialysis    Code Status: full code    Disposition: BON Taveras PA-C     Please call [phone] after 6am ASSESSMENT: 74 male w/ a PMHx of HTN, DM, AF, BPH, MASH cirrhosis and HCC s/p OLT on 11/15. Case was uneventful but post-op course has been prolonged and c/b delirium, aspiration, multiple episodes of acute hypoxic respiratory failure requiring reintubation from 11/20-11/24 & 11/24-11/26, AF w/ RVR, ileus requiring NGT, distended colon requiring rectal tube, dysphagia, malnutrition, hypernatremia, fevers, pancytopenia, poorly controlled glucoses, and left brachial VTE. Patient went into severe refractory septic shock on 12/6 w/ blood cultures positive for E. coli s/p s/p ex lap, total abd colectomy, end ileostomy, 3 intentionally retained laparotomy pads for bleeding, Abthera, IABP placement w/ admission to CTICU, Patient required inotropes, Jacqui, and CRRT post-op. s/p closure 12/9. IABP removed 12/10 and transferred back to SICU 12/13. SICU course c/b narrow complex arrythmia w/ ECG showing new ST elevations concerning for posterior wall MI vs vasospasms, cards consulted and started on heparin gtt for empiric ACS tx which was c/b GIB then transitioned to argatroban c/b bleed. TTE revealed LVOT obstruction & TODD    PLAN:    Neuro:  - Pain: PRN dilaudid (0.5)  - holding Seroquel and melatonin d/t poor mental status  - Opens eyes intermittently, intermittently following commands, off sedation (more awake 1/13)  - CT head 11/19, 11/21, 11/25, 11/29, 12/5, 12/20, 1/11 negative  - EEG: Mild diffuse cerebral dysfunction that is not specific in etiology. No epileptic discharges recorded. No seizures recorded.  - Holding home xanax, Abilify, Zoloft, Remeron, Lexapro  - No need for MRI    Resp:  - S/p bedside tracheostomy 12/17  - trach collar daily, CPAP overnight  - c/w chest PT  - c/w inhaled bronchodilator  - c/w suctioning PRN    CV:  - on kassandra, vaso  - holding midodrine while NPO  - IV Metoprolol 5mg q6hr  - IABP removed 12/10  - TTE 12/6 w/ LVOT obstruction & TODD  - TTE 12/11 shows EF of 55-60%    GI:  - trend LFTs  - NPO, NGT (potentially start trickle feeds 1/13)  - all anti-motility agents held  - protonix BID  - monitor ALYSSA output  - CT 1/11: no obstruction, enteritis, foci of air concerning for early SBO    /Renal:  - CRRT negative (10-25) if okay with transplant  50q8 albumin  - Monitor BUN/Cr, I&Os, trend UOP  - Monitor scrotal necrosis, maintain nichols at all times  - Bladder scan q12    Heme:  - trend H/H, PLTs, coags  - hep gtt and argatroban gtt held i/s/o bleed  - PLEX x4, last session 1/9    ID:  - ABX: Voriconazole, imepenem   - transplant ppx w/ bactrim  - Ganciclovir for CMV   - holding immunosuppresion 2/2 cavitary lesion on CT chest  - Tracheal aspirate -> Stenotrophomas maltophilia  - UCx 12/16 positive, Combi cath 12/19 positive  - BCx positive for clostridium paraputrificum 12/28  - BCx 01/13 f/u  - Mouthwash for mouth ulcers  - C diff and GI stool PCR negative  - ID->anticipate 14 total days antibiotics  - Bactrim dose escalatated to treatment dose    Endo:  - monitor glucose   - methylprednisone 16 qd    Lines:   - NGT   - ALYSSA  - BLANCO trialysis  Code Status: full code    Disposition: BON Taveras PA-C     Please call [phone] after 6am

## 2025-01-13 NOTE — PROGRESS NOTE ADULT - SUBJECTIVE AND OBJECTIVE BOX
Transplant Surgery - Multidisciplinary Rounds  --------------------------------------------------------------  OLT   11/15/2024         Colectomy 12/7/2024     IABP 12/7 removed 12/10  Tracheostomy 12/17/2024    Present:   Patient seen and examined with multidisciplinary Transplant team including Surgeon: Dr. Palomino, Hepatologist Dr. Hidalgo , JAZZ Colby, and bedside RN in AM rounds.   Disciplines not in attendance will be notified of the plan.     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   s/p OLT 11/15 with post op course c/b:  ·	Hypoxia: Reintubated 11/20, extubated 11/24->reintubated 11/24, extubated 11/26, reintubated 12/7, trached 12/17  ·	Ileus   ·	A fib  ·	AMS/Seizure   ·	L brachial DVT  ·	Neutropenia  ·	E coli Bacteremia (12/6)  ·	s/p Colectomy 12/7.   ·	IABP 12/7, removed 12/10  ·	Ec Faecalis UTI (12/16)  ·	Stenotrophamonas in trach aspirate (12/19)  ·	Clostridium in blood 12/23  ·	UGIB 12/26  ·	CMV Viremia  ·	MORAIMA requiring CRRT  ·	Shock liver    Interval/Overnight Events:   - afebrile, on kassandra and vaso  - held cyclo 2/2 voriconazole   -s/p 2u prbc/1pt     Immunosuppression:  - Cyclo HELD, MMF HELD, solumed 16  -ongoing monitoring for signs of rejection        TX DATA HERE         ROS: Unable to assess patient is lethargic, s/p tracheostomy    PHYSICAL EXAM:   Constitutional: trach to vent, awake  Eyes:  PERRLA, Scleral icterus  ENMT: nc/at, no thrush, NGT  Neck: supple, trach   Respiratory: CTA B/L  Cardiovascular: RRR  Gastrointestinal: incision clean/dry/intact + Ostomy dusky low/no output, wound vac, peritoneal drains x1 bilious   Genitourinary: +scrotal edema w/ large fluid collection; black/green discoloration  Extremities: SCD's in place and working bilaterally, + LE edema  Neurological: trach to vent, opens eyes to voice   Skin: large sacral decub, poor healing with breakdown Transplant Surgery - Multidisciplinary Rounds  --------------------------------------------------------------  OLT   11/15/2024         Colectomy 12/7/2024     IABP 12/7 removed 12/10  Tracheostomy 12/17/2024    Present:   Patient seen and examined with multidisciplinary Transplant team including Surgeon: Dr. Palomino, Hepatologist Dr. Hidalgo , JAZZ Colby, and bedside RN in AM rounds.   Disciplines not in attendance will be notified of the plan.     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   s/p OLT 11/15 with post op course c/b:  ·	Hypoxia: Reintubated 11/20, extubated 11/24->reintubated 11/24, extubated 11/26, reintubated 12/7, trached 12/17  ·	Ileus   ·	A fib  ·	AMS/Seizure   ·	L brachial DVT  ·	Neutropenia  ·	E coli Bacteremia (12/6)  ·	s/p Colectomy 12/7.   ·	IABP 12/7, removed 12/10  ·	Ec Faecalis UTI (12/16)  ·	Stenotrophamonas in trach aspirate (12/19)  ·	Clostridium in blood 12/23  ·	UGIB 12/26  ·	CMV Viremia  ·	MORAIMA requiring CRRT  ·	Shock liver    Interval/Overnight Events:   - afebrile, on kassandra and vaso  - held cyclo 2/2 voriconazole   -s/p 2u prbc/1pt     Immunosuppression:  - Cyclo HELD, MMF HELD, solumed 16  -ongoing monitoring for signs of rejection          MEDICATIONS  (STANDING):  albumin human 25% IVPB 50 milliLiter(s) IV Intermittent every 8 hours  buDESOnide    Inhalation Suspension 0.5 milliGRAM(s) Inhalation every 12 hours  chlorhexidine 0.12% Liquid 15 milliLiter(s) Oral Mucosa every 12 hours  chlorhexidine 2% Cloths 1 Application(s) Topical <User Schedule>  collagenase Ointment 1 Application(s) Topical daily  CRRT Treatment    <Continuous>  epoetin tonja (PROCRIT) Injectable 71574 Unit(s) IV Push every 7 days  ganciclovir IVPB 250 milliGRAM(s) IV Intermittent every 12 hours  HYDROmorphone  Injectable 0.5 milliGRAM(s) IV Push once  imipenem/cilastatin  IVPB 500 milliGRAM(s) IV Intermittent every 6 hours  methylPREDNISolone sodium succinate Injectable 16 milliGRAM(s) IV Push daily  metoprolol tartrate Injectable 5 milliGRAM(s) IV Push every 6 hours  mupirocin 2% Ointment 1 Application(s) Topical every 12 hours  nystatin Powder 1 Application(s) Topical every 12 hours  pantoprazole  Injectable 40 milliGRAM(s) IV Push every 12 hours  phenylephrine    Infusion 0.2 MICROgram(s)/kG/Min (7.46 mL/Hr) IV Continuous <Continuous>  Phoxillum Filtration BK 4 / 2.5 5000 milliLiter(s) (1200 mL/Hr) CRRT <Continuous>  PrismaSATE Dialysate BGK 4 / 2.5 5000 milliLiter(s) (1500 mL/Hr) CRRT <Continuous>  PrismaSOL Filtration BGK 0 / 2.5 5000 milliLiter(s) (200 mL/Hr) CRRT <Continuous>  trimethoprim / sulfamethoxazole IVPB 390 milliGRAM(s) IV Intermittent every 12 hours  vasopressin Infusion 0.03 Unit(s)/Min (4.5 mL/Hr) IV Continuous <Continuous>  voriconazole IVPB 200 milliGRAM(s) IV Intermittent every 12 hours    MEDICATIONS  (PRN):  albuterol/ipratropium for Nebulization 3 milliLiter(s) Nebulizer every 6 hours PRN Shortness of Breath and/or Wheezing  FIRST- Mouthwash  BLM 10 milliLiter(s) Swish and Spit every 8 hours PRN Mouth Care  HYDROmorphone  Injectable 0.25 milliGRAM(s) IV Push every 3 hours PRN Moderate Pain (4 - 6)      PAST MEDICAL & SURGICAL HISTORY:  Diabetes      Transaminitis      Paroxysmal atrial fibrillation      Depression      BPH (benign prostatic hyperplasia)      Hypertension      Chronic atrial fibrillation      Coronary artery disease      Hepatocellular carcinoma      DM (diabetes mellitus)      HTN (hypertension)      Paroxysmal atrial fibrillation      Cirrhosis      HCC (hepatocellular carcinoma)      History of BPH      History of laparoscopic cholecystectomy      History of lumbar laminectomy      H/O prior ablation treatment      H/O percutaneous left heart catheterization          Vital Signs Last 24 Hrs  T(C): 36.7 (13 Jan 2025 15:00), Max: 37.1 (12 Jan 2025 22:00)  T(F): 98.1 (13 Jan 2025 15:00), Max: 98.8 (12 Jan 2025 22:00)  HR: 91 (13 Jan 2025 16:45) (79 - 133)  BP: --  BP(mean): --  RR: 17 (13 Jan 2025 16:45) (14 - 24)  SpO2: 92% (13 Jan 2025 16:45) (91% - 99%)    Parameters below as of 13 Jan 2025 07:00  Patient On (Oxygen Delivery Method): ventilator    O2 Concentration (%): 40    I&O's Summary    12 Jan 2025 07:01  -  13 Jan 2025 07:00  --------------------------------------------------------  IN: 3203.7 mL / OUT: 2881 mL / NET: 322.7 mL    13 Jan 2025 07:01  -  13 Jan 2025 17:09  --------------------------------------------------------  IN: 1013.3 mL / OUT: 950 mL / NET: 63.3 mL                              9.6    2.66  )-----------( 19       ( 13 Jan 2025 12:05 )             27.8     01-13    137  |  102  |  21  ----------------------------<  148[H]  4.1   |  18[L]  |  <0.30[L]    Ca    8.8      13 Jan 2025 12:05  Phos  2.9     01-13  Mg     2.4     01-13    TPro  4.3[L]  /  Alb  3.4  /  TBili  27.5[H]  /  DBili  x   /  AST  41[H]  /  ALT  25  /  AlkPhos  94  01-13          Culture - Sputum (collected 01-12-25 @ 09:52)  Source: Trach Asp Tracheal Aspirate  Gram Stain (01-12-25 @ 23:20):    No polymorphonuclear leukocytes per low power field    Rare Squamous epithelial cells per low power field    Numerous Gram Negative Rods per oil power field    Few Gram Positive Cocci in Clusters per oil power field    Rare Yeast like cells per oil power field  Preliminary Report (01-13-25 @ 15:39):    Commensal charity consistent with body site    Culture - Blood (collected 01-11-25 @ 17:50)  Source: .Blood BLOOD  Preliminary Report (01-13-25 @ 03:01):    No growth at 24 hours    Culture - Blood (collected 01-11-25 @ 16:58)  Source: .Blood BLOOD  Gram Stain (01-13-25 @ 02:56):    Growth in aerobic bottle: Gram Negative Rods  Preliminary Report (01-13-25 @ 02:56):    Growth in aerobic bottle: Gram Negative Rods    Direct identification is available within approximately 3-5    hours either by Blood Panel Multiplexed PCR or Direct    MALDI-TOF. Details: [email or web address]  Organism: Blood Culture PCR (01-13-25 @ 05:28)  Organism: Blood Culture PCR (01-13-25 @ 05:28)          ROS: Unable to assess patient is lethargic, s/p tracheostomy    PHYSICAL EXAM:   Constitutional: trach to vent, awake  Eyes:  PERRLA, Scleral icterus  ENMT: nc/at, no thrush, NGT  Neck: supple, trach   Respiratory: CTA B/L  Cardiovascular: RRR  Gastrointestinal: incision clean/dry/intact + Ostomy dusky low/no output, wound vac, peritoneal drains x1 bilious   Genitourinary: +scrotal edema w/ large fluid collection; black/green discoloration  Extremities: SCD's in place and working bilaterally, + LE edema  Neurological: trach to vent, opens eyes to voice   Skin: large sacral decub, poor healing with breakdown

## 2025-01-13 NOTE — PROGRESS NOTE ADULT - SUBJECTIVE AND OBJECTIVE BOX
INFECTIOUS DISEASES FOLLOW UP-- Renée Carrero  [phone]    This is a follow up note for this  74yMale with  Status post liver transplantation  remains critically ill now with Stenotrophomonas bacteremia from pulmonary source  also with CMV viremia        ROS:  CONSTITUTIONAL: trach/vent  opens eyes to name      Allergies    No Known Allergies    Intolerances        ANTIBIOTICS/RELEVANT:  antimicrobials  ganciclovir IVPB 250 milliGRAM(s) IV Intermittent every 12 hours  imipenem/cilastatin  IVPB 500 milliGRAM(s) IV Intermittent every 6 hours  levoFLOXacin IVPB 750 milliGRAM(s) IV Intermittent every 48 hours  trimethoprim / sulfamethoxazole IVPB 390 milliGRAM(s) IV Intermittent every 12 hours  voriconazole IVPB 200 milliGRAM(s) IV Intermittent every 12 hours    immunologic:  epoetin tonja (PROCRIT) Injectable 20326 Unit(s) IV Push every 7 days    OTHER:  albumin human 25% IVPB 50 milliLiter(s) IV Intermittent every 8 hours  albuterol/ipratropium for Nebulization 3 milliLiter(s) Nebulizer every 6 hours PRN  buDESOnide    Inhalation Suspension 0.5 milliGRAM(s) Inhalation every 12 hours  chlorhexidine 0.12% Liquid 15 milliLiter(s) Oral Mucosa every 12 hours  chlorhexidine 2% Cloths 1 Application(s) Topical <User Schedule>  collagenase Ointment 1 Application(s) Topical daily  CRRT Treatment    <Continuous>  FIRST- Mouthwash  BLM 10 milliLiter(s) Swish and Spit every 8 hours PRN  HYDROmorphone  Injectable 0.25 milliGRAM(s) IV Push every 3 hours PRN  HYDROmorphone  Injectable 0.5 milliGRAM(s) IV Push once  methylPREDNISolone sodium succinate Injectable 16 milliGRAM(s) IV Push daily  metoprolol tartrate Injectable 5 milliGRAM(s) IV Push every 6 hours  mupirocin 2% Ointment 1 Application(s) Topical every 12 hours  nystatin Powder 1 Application(s) Topical every 12 hours  pantoprazole  Injectable 40 milliGRAM(s) IV Push every 12 hours  Phoxillum Filtration BK 4 / 2.5 5000 milliLiter(s) CRRT <Continuous>  PrismaSATE Dialysate BGK 4 / 2.5 5000 milliLiter(s) CRRT <Continuous>  PrismaSOL Filtration BGK 0 / 2.5 5000 milliLiter(s) CRRT <Continuous>  vasopressin Infusion 0.03 Unit(s)/Min IV Continuous <Continuous>      Objective:  Vital Signs Last 24 Hrs  T(C): 36.5 (13 Jan 2025 19:00), Max: 37.1 (12 Jan 2025 22:00)  T(F): 97.7 (13 Jan 2025 19:00), Max: 98.8 (12 Jan 2025 22:00)  HR: 85 (13 Jan 2025 19:00) (79 - 133)  BP: --  BP(mean): --  RR: 16 (13 Jan 2025 19:00) (14 - 24)  SpO2: 95% (13 Jan 2025 19:00) (91% - 100%)    Parameters below as of 13 Jan 2025 17:54  Patient On (Oxygen Delivery Method): tracheostomy collar        PHYSICAL EXAM:  Constitutional:minimally responsive on vent  Eyes:DUSTIN, icteric  Ear/Nose/Throat: no oral lesions,trach with dried blood around site   HD ctheter	  Respiratory: crackles bilaterally  Cardiovascular: S1S2 tachy  Gastrointestinal: dressing in place, ostomy with small amount of output  Extremities:no e/e/c  No Lymphadenopathy  IV sites not inflammed.    LABS:                        9.2    2.19  )-----------( 27       ( 13 Jan 2025 17:51 )             26.9     01-13    137  |  103  |  20  ----------------------------<  136[H]  4.0   |  19[L]  |  <0.30[L]    Ca    8.9      13 Jan 2025 17:51  Phos  2.9     01-13  Mg     2.4     01-13    TPro  4.2[L]  /  Alb  3.4  /  TBili  27.0[H]  /  DBili  x   /  AST  53[H]  /  ALT  24  /  AlkPhos  99  01-13    PT/INR - ( 13 Jan 2025 17:51 )   PT: 30.1 sec;   INR: 2.64 ratio         PTT - ( 13 Jan 2025 17:51 )  PTT:65.5 sec  Urinalysis Basic - ( 13 Jan 2025 17:51 )    Color: x / Appearance: x / SG: x / pH: x  Gluc: 136 mg/dL / Ketone: x  / Bili: x / Urobili: x   Blood: x / Protein: x / Nitrite: x   Leuk Esterase: x / RBC: x / WBC x   Sq Epi: x / Non Sq Epi: x / Bacteria: x        MICROBIOLOGY:            RECENT CULTURES:  01-12 @ 09:52  Trach Asp Tracheal Aspirate  --  --  --    Commensal charity consistent with body site  --  01-11 @ 17:50  .Blood BLOOD  --  --  --    No growth at 24 hours  --  01-11 @ 16:58  .Blood BLOOD  Blood Culture PCR  Blood Culture PCR  PCR    Growth in aerobic bottle: Stenotrophomonas maltophilia  Direct identification is available within approximately 3-5  hours either by Blood Panel Multiplexed PCR or Direct  MALDI-TOF. Details: [email or web address]  --  Cytomegalovirus By PCR: 1280 IU/mL (01.12.25 @ 17:45)    Culture - Sputum . (01.12.25 @ 09:52)    Gram Stain:   No polymorphonuclear leukocytes per low power field  Rare Squamous epithelial cells per low power field  Numerous Gram Negative Rods per oil power field  Few Gram Positive Cocci in Clusters per oil power field  Rare Yeast like cells per oil power field   Specimen Source: Trach Asp Tracheal Aspirate   Culture Results:   Commensal charity consistent with body site        RADIOLOGY & ADDITIONAL STUDIES:    < from: Xray Chest 1 View- PORTABLE-Urgent (Xray Chest 1 View- PORTABLE-Urgent .) (01.12.25 @ 16:57) >    INTERPRETATION:  HISTORY: Admitting Dxs: Z94.4 LIVER TRANSPLANT STATUS;    NGT s/p repositioning;  TECHNIQUE: Portable frontal view of the chest, 2 films.  COMPARISON: 1/12.  FINDINGS/  IMPRESSION:  The NG tube overlies the stomach region. The tip may be at the EG   junction. Right central line in the SVC region.  HEART:  Enlarged.  LUNGS: Bilateral effusions and basilar infiltrates. Likely congestive   heart failure.. Aeration appears worse  BONES: degenerative changes. Lower cervical spine fusion hardware.    --- End of Report ---    < end of copied text >

## 2025-01-13 NOTE — PROGRESS NOTE ADULT - SUBJECTIVE AND OBJECTIVE BOX
SURGERY DAILY PROGRESS NOTE:     SUBJECTIVE/ROS: Patient seen and examined. Unable to obtain ROS.     OBJECTIVE:    Vitals: T(F): 98.2 (01-13-25 @ 07:00), Max: 99.1 (01-12-25 @ 11:00)  HR: 108 (01-13-25 @ 09:22)  BP: --  RR: 20 (01-13-25 @ 09:15)  SpO2: 96% (01-13-25 @ 09:22)  Mode: AC/ CMV (Assist Control/ Continuous Mandatory Ventilation), RR (machine): 14, TV (machine): 470, FiO2: 40, PEEP: 5, ITime: 0.9, MAP: 11, PIP: 24      Physical Exam:  General Appearance: ill-appearing, jaundiced  Respiratory: trach, ventilated full support  Abdomen: distended, ileostomy edematous with minimal output, midline incision with packing, ALYSSA x 1 w s/s output    Diet, NPO:   Except Medications      01-12-25 @ 07:01  -  01-13-25 @ 07:00  --------------------------------------------------------  IN:    Albumin 25%  -  50 mL: 100 mL    Cryoprecipitate: 75 mL    Enteral Tube Flush: 60 mL    IV PiggyBack: 200 mL    IV PiggyBack: 1425 mL    Phenylephrine: 410.7 mL    Platelets - Single Donor: 225 mL    PRBCs (Packed Red Blood Cells): 600 mL    Vasopressin: 108 mL  Total IN: 3203.7 mL    OUT:    Bulb (mL): 1025 mL    Ileostomy (mL): 45 mL    Indwelling Catheter - Urethral (mL): 5 mL    Nasogastric/Oral tube (mL): 300 mL    Other (mL): 1506 mL  Total OUT: 2881 mL    Total NET: 322.7 mL        Bowel Movement: : [] YES [] NO  Flatus: : [] YES [] NO    PHYSICAL EXAM  GEN:  CV:  LUNGS:  ABD:  EXT:  WOUND:  INCISION:    MEDICATIONS  (STANDING):  buDESOnide    Inhalation Suspension 0.5 milliGRAM(s) Inhalation every 12 hours  chlorhexidine 0.12% Liquid 15 milliLiter(s) Oral Mucosa every 12 hours  chlorhexidine 2% Cloths 1 Application(s) Topical <User Schedule>  collagenase Ointment 1 Application(s) Topical daily  CRRT Treatment    <Continuous>  epoetin tonja (PROCRIT) Injectable 74979 Unit(s) IV Push every 7 days  ganciclovir IVPB 250 milliGRAM(s) IV Intermittent every 12 hours  HYDROmorphone  Injectable 0.5 milliGRAM(s) IV Push once  imipenem/cilastatin  IVPB 500 milliGRAM(s) IV Intermittent every 6 hours  methylPREDNISolone sodium succinate Injectable 16 milliGRAM(s) IV Push daily  metoprolol tartrate Injectable 5 milliGRAM(s) IV Push every 6 hours  mupirocin 2% Ointment 1 Application(s) Topical every 12 hours  nystatin Powder 1 Application(s) Topical every 12 hours  pantoprazole  Injectable 40 milliGRAM(s) IV Push every 12 hours  phenylephrine    Infusion 0.2 MICROgram(s)/kG/Min (7.46 mL/Hr) IV Continuous <Continuous>  Phoxillum Filtration BK 4 / 2.5 5000 milliLiter(s) (1200 mL/Hr) CRRT <Continuous>  PrismaSATE Dialysate BGK 4 / 2.5 5000 milliLiter(s) (1500 mL/Hr) CRRT <Continuous>  PrismaSOL Filtration BGK 0 / 2.5 5000 milliLiter(s) (200 mL/Hr) CRRT <Continuous>  trimethoprim / sulfamethoxazole IVPB 390 milliGRAM(s) IV Intermittent every 12 hours  vasopressin Infusion 0.03 Unit(s)/Min (4.5 mL/Hr) IV Continuous <Continuous>  voriconazole IVPB 200 milliGRAM(s) IV Intermittent every 12 hours    MEDICATIONS  (PRN):  albuterol/ipratropium for Nebulization 3 milliLiter(s) Nebulizer every 6 hours PRN Shortness of Breath and/or Wheezing  FIRST- Mouthwash  BLM 10 milliLiter(s) Swish and Spit every 8 hours PRN Mouth Care  HYDROmorphone  Injectable 0.25 milliGRAM(s) IV Push every 3 hours PRN Moderate Pain (4 - 6)      DVT PROPHYLAXIS: SCDs,   GI PROPHYLAXIS: pantoprazole  Injectable 40 milliGRAM(s) IV Push every 12 hours    ANTICOAGULATION:   ANTIBIOTICS: ganciclovir IVPB 250 milliGRAM(s)  imipenem/cilastatin  IVPB 500 milliGRAM(s)  trimethoprim / sulfamethoxazole IVPB 390 milliGRAM(s)  voriconazole IVPB 200 milliGRAM(s)        LAB/STUDIES:  CAPILLARY BLOOD GLUCOSE                              9.0    2.07  )-----------( 22       ( 13 Jan 2025 06:19 )             25.6     01-13    137  |  102  |  22  ----------------------------<  125[H]  4.0   |  19[L]  |  <0.30[L]    Ca    9.0      13 Jan 2025 06:20  Phos  2.8     01-13  Mg     2.3     01-13    TPro  4.2[L]  /  Alb  3.4  /  TBili  26.7[H]  /  DBili  x   /  AST  41[H]  /  ALT  22  /  AlkPhos  85  01-13               4.2  | 26.7 | 41       ------------------[85      ( 13 Jan 2025 06:20 )  3.4  | x    | 22          Lipase:x      Amylase:x        PT/INR - ( 13 Jan 2025 06:20 )   PT: 35.9 sec;   INR: 3.19 ratio         PTT - ( 13 Jan 2025 06:20 )  PTT:64.0 sec  Urinalysis Basic - ( 13 Jan 2025 06:20 )    Color: x / Appearance: x / SG: x / pH: x  Gluc: 125 mg/dL / Ketone: x  / Bili: x / Urobili: x   Blood: x / Protein: x / Nitrite: x   Leuk Esterase: x / RBC: x / WBC x   Sq Epi: x / Non Sq Epi: x / Bacteria: x      CARDIAC MARKERS ( 11 Jan 2025 10:06 )  x     / x     / x     / x     / 10.4 ng/mL      ABG - ( 13 Jan 2025 06:02 )  pH, Arterial: 7.39  pH, Blood: x     /  pCO2: 37    /  pO2: 110   / HCO3: 22    / Base Excess: -2.3  /  SaO2: 99.3                Culture - Sputum (collected 12 Jan 2025 09:52)  Source: Trach Asp Tracheal Aspirate  Gram Stain (12 Jan 2025 23:20):    No polymorphonuclear leukocytes per low power field    Rare Squamous epithelial cells per low power field    Numerous Gram Negative Rods per oil power field    Few Gram Positive Cocci in Clusters per oil power field    Rare Yeast like cells per oil power field    Culture - Blood (collected 11 Jan 2025 17:50)  Source: .Blood BLOOD  Preliminary Report (13 Jan 2025 03:01):    No growth at 24 hours    Culture - Blood (collected 11 Jan 2025 16:58)  Source: .Blood BLOOD  Gram Stain (13 Jan 2025 02:56):    Growth in aerobic bottle: Gram Negative Rods  Preliminary Report (13 Jan 2025 02:56):    Growth in aerobic bottle: Gram Negative Rods    Direct identification is available within approximately 3-5    hours either by Blood Panel Multiplexed PCR or Direct    MALDI-TOF. Details: [email or web address]  Organism: Blood Culture PCR (13 Jan 2025 05:28)  Organism: Blood Culture PCR (13 Jan 2025 05:28)

## 2025-01-13 NOTE — PROGRESS NOTE ADULT - NS ATTEND AMEND GEN_ALL_CORE FT
s/p OLT 11/15 with post op course c/b:  Hypoxia: Reintubated 11/20, extubated 11/24->reintubated 11/24, extubated 11/26, reintubated 12/7, trached 12/17  Ileus   A fib  AMS/Seizure   L brachial DVT  Neutropenia  E coli Bacteremia (12/6)  s/p Colectomy 12/7.   IABP 12/7, removed 12/10  Ec Faecalis UTI (12/16)  Stenotrophamonas in trach aspirate (12/19)  Clostridium in blood 12/23  UGIB 12/26  CMV Viremia  MORAIMA requiring CRRT  Shock liver    Arousable, off sedatives   TC trials  Titrate Aldo, Vaso, avoid tachycardia  NPO, ?trophic feed, stoma output minimal  CRRT with net even  Abx Meropenem, Voriconazole for possible aspergillus, , Rx dose Bactrim for Stenotrophomonas   Intermittent alb  Transfusion as needed, mech DVT PPx    Prognosis remains very poor  Family kept updated at bed side

## 2025-01-13 NOTE — PROGRESS NOTE ADULT - SUBJECTIVE AND OBJECTIVE BOX
24 HOUR EVENTS:  - 3u PRBC for hgb 6.6  - 1PLT, 1 Cryo per TEG      NEURO  Exam: follows commands  Meds: HYDROmorphone  Injectable 0.25 milliGRAM(s) IV Push every 3 hours PRN Moderate Pain (4 - 6)      RESPIRATORY  RR: 16 (01-13-25 @ 01:45) (14 - 25)  SpO2: 96% (01-13-25 @ 01:45) (93% - 100%)  Exam: unlabored  Mechanical Ventilation: Mode: AC/ CMV (Assist Control/ Continuous Mandatory Ventilation), RR (machine): 14, RR (patient): 17, TV (machine): 470, FiO2: 40, PEEP: 5, ITime: 1, MAP: 7.2, PIP: 14  ABG - ( 13 Jan 2025 00:39 )  pH: 7.38  /  pCO2: 36    /  pO2: 135   / HCO3: 21    / Base Excess: -3.4  /  SaO2: 99.6            Meds: albuterol/ipratropium for Nebulization 3 milliLiter(s) Nebulizer every 6 hours PRN Shortness of Breath and/or Wheezing  buDESOnide    Inhalation Suspension 0.5 milliGRAM(s) Inhalation every 12 hours      CARDIOVASCULAR  HR: 88 (01-13-25 @ 01:45) (79 - 98)  ABP: 101/48 (01-13-25 @ 01:45) (86/52 - 121/57)  ABP(mean): 69 (01-13-25 @ 01:45) (64 - 98)  VBG - ( 11 Jan 2025 05:53 )  pH: 7.29  /  pCO2: 45    /  pO2: 46    / HCO3: 22    / Base Excess: -4.7  /  SaO2: 76.2   Lactate: 3.5        Exam:   Cardiac Rhythm:   Perfusion     [ x]Adequate   [ ]Inadequate  Mentation   [ ]Normal       [x ]Reduced  Extremities  [x ]Warm         [ ]Cool  Volume Status [ ]Hypervolemic [x ]Euvolemic [ ]Hypovolemic  Meds: metoprolol tartrate Injectable 5 milliGRAM(s) IV Push every 6 hours  phenylephrine    Infusion 0.2 MICROgram(s)/kG/Min IV Continuous <Continuous>      GI/NUTRITION  Exam: soft, NT/mild distention  Diet: NPO  Meds: pantoprazole  Injectable 40 milliGRAM(s) IV Push every 12 hours      GENITOURINARY  I&O's Detail    01-11 @ 07:01 - 01-12 @ 07:00  --------------------------------------------------------  IN:    Albumin 25%  -  50 mL: 200 mL    Albumin 5%  - 250 mL: 250 mL    Cryoprecipitate: 150 mL    Enteral Tube Flush: 100 mL    IV PiggyBack: 1300 mL    Phenylephrine: 2974.5 mL    Platelets - Single Donor: 675 mL    PRBCs (Packed Red Blood Cells): 600 mL    Vasopressin: 58.5 mL  Total IN: 6308 mL    OUT:    Bulb (mL): 375 mL    Ileostomy (mL): 50 mL    Indwelling Catheter - Urethral (mL): 5 mL    Nasogastric/Oral tube (mL): 850 mL    Other (mL): 2579 mL  Total OUT: 3859 mL    Total NET: 2449 mL      01-12 @ 07:01 - 01-13 @ 02:01  --------------------------------------------------------  IN:    Albumin 25%  -  50 mL: 100 mL    Cryoprecipitate: 75 mL    Enteral Tube Flush: 30 mL    IV PiggyBack: 200 mL    IV PiggyBack: 975 mL    Phenylephrine: 373.3 mL    Platelets - Single Donor: 225 mL    PRBCs (Packed Red Blood Cells): 600 mL    Vasopressin: 81 mL  Total IN: 2659.3 mL    OUT:    Bulb (mL): 550 mL    Ileostomy (mL): 30 mL    Indwelling Catheter - Urethral (mL): 0 mL    Nasogastric/Oral tube (mL): 300 mL    Other (mL): 1158 mL  Total OUT: 2038 mL    Total NET: 621.3 mL          01-13    138  |  102  |  22  ----------------------------<  127[H]  4.2   |  19[L]  |  <0.30[L]    Ca    9.0      13 Jan 2025 00:46  Phos  2.9     01-13  Mg     2.4     01-13    TPro  4.5[L]  /  Alb  3.8  /  TBili  25.9[H]  /  DBili  x   /  AST  39  /  ALT  23  /  AlkPhos  80  01-13    Meds:     HEMATOLOGIC  Meds:                         8.6    1.83  )-----------( 25 ( 13 Jan 2025 00:47 )             24.4     PT/INR - ( 12 Jan 2025 11:43 )   PT: 35.7 sec;   INR: 3.17 ratio         PTT - ( 12 Jan 2025 11:43 )  PTT:67.2 sec    INFECTIOUS DISEASES  T(C): 37.1 (01-12-25 @ 22:00), Max: 37.6 (01-12-25 @ 05:00)  Wt(kg): --  WBC Count: 1.83 K/uL (01-13 @ 00:47)  WBC Count: 1.79 K/uL (01-12 @ 17:45)  WBC Count: 1.99 K/uL (01-12 @ 11:43)  WBC Count: 2.82 K/uL (01-12 @ 06:31)    Recent Cultures:  Specimen Source: Trach Asp Tracheal Aspirate, 01-12 @ 09:52; Results --; Gram Stain:   No polymorphonuclear leukocytes per low power field  Rare Squamous epithelial cells per low power field  Numerous Gram Negative Rods per oil power field  Few Gram Positive Cocci in Clusters per oil power field  Rare Yeast like cells per oil power field[!]; Organism: --    Meds: epoetin tonja (PROCRIT) Injectable 81971 Unit(s) IV Push every 7 days  ganciclovir IVPB 250 milliGRAM(s) IV Intermittent every 12 hours  imipenem/cilastatin  IVPB 500 milliGRAM(s) IV Intermittent every 6 hours  trimethoprim / sulfamethoxazole IVPB 160 milliGRAM(s) IV Intermittent daily  voriconazole IVPB 200 milliGRAM(s) IV Intermittent every 12 hours      ENDOCRINE  Capillary Blood Glucose    Meds: methylPREDNISolone sodium succinate Injectable 16 milliGRAM(s) IV Push daily  vasopressin Infusion 0.03 Unit(s)/Min IV Continuous <Continuous>      OTHER MEDICATIONS:  chlorhexidine 0.12% Liquid 15 milliLiter(s) Oral Mucosa every 12 hours  chlorhexidine 2% Cloths 1 Application(s) Topical <User Schedule>  collagenase Ointment 1 Application(s) Topical daily  CRRT Treatment    <Continuous>  FIRST- Mouthwash  BLM 10 milliLiter(s) Swish and Spit every 8 hours PRN  mupirocin 2% Ointment 1 Application(s) Topical every 12 hours  nystatin Powder 1 Application(s) Topical every 12 hours  Phoxillum Filtration BK 4 / 2.5 5000 milliLiter(s) CRRT <Continuous>  PrismaSATE Dialysate BGK 4 / 2.5 5000 milliLiter(s) CRRT <Continuous>  PrismaSOL Filtration BGK 0 / 2.5 5000 milliLiter(s) CRRT <Continuous>   24 HOUR EVENTS:  - 3u PRBC for hgb 6.6  - 1PLT, 1 Cryo per TEG  - Trach collar placed  - Repeat BC ordered  - Bactrim dose increased to treatment dose        NEURO  Exam: follows commands  Meds: HYDROmorphone  Injectable 0.25 milliGRAM(s) IV Push every 3 hours PRN Moderate Pain (4 - 6)      RESPIRATORY  RR: 16 (01-13-25 @ 01:45) (14 - 25)  SpO2: 96% (01-13-25 @ 01:45) (93% - 100%)  Exam: unlabored  Mechanical Ventilation: Mode: AC/ CMV (Assist Control/ Continuous Mandatory Ventilation), RR (machine): 14, RR (patient): 17, TV (machine): 470, FiO2: 40, PEEP: 5, ITime: 1, MAP: 7.2, PIP: 14  ABG - ( 13 Jan 2025 00:39 )  pH: 7.38  /  pCO2: 36    /  pO2: 135   / HCO3: 21    / Base Excess: -3.4  /  SaO2: 99.6            Meds: albuterol/ipratropium for Nebulization 3 milliLiter(s) Nebulizer every 6 hours PRN Shortness of Breath and/or Wheezing  buDESOnide    Inhalation Suspension 0.5 milliGRAM(s) Inhalation every 12 hours      CARDIOVASCULAR  HR: 88 (01-13-25 @ 01:45) (79 - 98)  ABP: 101/48 (01-13-25 @ 01:45) (86/52 - 121/57)  ABP(mean): 69 (01-13-25 @ 01:45) (64 - 98)  VBG - ( 11 Jan 2025 05:53 )  pH: 7.29  /  pCO2: 45    /  pO2: 46    / HCO3: 22    / Base Excess: -4.7  /  SaO2: 76.2   Lactate: 3.5        Exam:   Cardiac Rhythm:   Perfusion     [ x]Adequate   [ ]Inadequate  Mentation   [ ]Normal       [x ]Reduced  Extremities  [x ]Warm         [ ]Cool  Volume Status [ ]Hypervolemic [x ]Euvolemic [ ]Hypovolemic  Meds: metoprolol tartrate Injectable 5 milliGRAM(s) IV Push every 6 hours  phenylephrine    Infusion 0.2 MICROgram(s)/kG/Min IV Continuous <Continuous>      GI/NUTRITION  Exam: soft, NT/mild distention  Diet: NPO  Meds: pantoprazole  Injectable 40 milliGRAM(s) IV Push every 12 hours      GENITOURINARY  I&O's Detail    01-11 @ 07:01 - 01-12 @ 07:00  --------------------------------------------------------  IN:    Albumin 25%  -  50 mL: 200 mL    Albumin 5%  - 250 mL: 250 mL    Cryoprecipitate: 150 mL    Enteral Tube Flush: 100 mL    IV PiggyBack: 1300 mL    Phenylephrine: 2974.5 mL    Platelets - Single Donor: 675 mL    PRBCs (Packed Red Blood Cells): 600 mL    Vasopressin: 58.5 mL  Total IN: 6308 mL    OUT:    Bulb (mL): 375 mL    Ileostomy (mL): 50 mL    Indwelling Catheter - Urethral (mL): 5 mL    Nasogastric/Oral tube (mL): 850 mL    Other (mL): 2579 mL  Total OUT: 3859 mL    Total NET: 2449 mL      01-12 @ 07:01 - 01-13 @ 02:01  --------------------------------------------------------  IN:    Albumin 25%  -  50 mL: 100 mL    Cryoprecipitate: 75 mL    Enteral Tube Flush: 30 mL    IV PiggyBack: 200 mL    IV PiggyBack: 975 mL    Phenylephrine: 373.3 mL    Platelets - Single Donor: 225 mL    PRBCs (Packed Red Blood Cells): 600 mL    Vasopressin: 81 mL  Total IN: 2659.3 mL    OUT:    Bulb (mL): 550 mL    Ileostomy (mL): 30 mL    Indwelling Catheter - Urethral (mL): 0 mL    Nasogastric/Oral tube (mL): 300 mL    Other (mL): 1158 mL  Total OUT: 2038 mL    Total NET: 621.3 mL          01-13    138  |  102  |  22  ----------------------------<  127[H]  4.2   |  19[L]  |  <0.30[L]    Ca    9.0      13 Jan 2025 00:46  Phos  2.9     01-13  Mg     2.4     01-13    TPro  4.5[L]  /  Alb  3.8  /  TBili  25.9[H]  /  DBili  x   /  AST  39  /  ALT  23  /  AlkPhos  80  01-13    Meds:     HEMATOLOGIC  Meds:                         8.6    1.83  )-----------( 25       ( 13 Jan 2025 00:47 )             24.4     PT/INR - ( 12 Jan 2025 11:43 )   PT: 35.7 sec;   INR: 3.17 ratio         PTT - ( 12 Jan 2025 11:43 )  PTT:67.2 sec    INFECTIOUS DISEASES  T(C): 37.1 (01-12-25 @ 22:00), Max: 37.6 (01-12-25 @ 05:00)  Wt(kg): --  WBC Count: 1.83 K/uL (01-13 @ 00:47)  WBC Count: 1.79 K/uL (01-12 @ 17:45)  WBC Count: 1.99 K/uL (01-12 @ 11:43)  WBC Count: 2.82 K/uL (01-12 @ 06:31)    Recent Cultures:  Specimen Source: Trach Asp Tracheal Aspirate, 01-12 @ 09:52; Results --; Gram Stain:   No polymorphonuclear leukocytes per low power field  Rare Squamous epithelial cells per low power field  Numerous Gram Negative Rods per oil power field  Few Gram Positive Cocci in Clusters per oil power field  Rare Yeast like cells per oil power field[!]; Organism: --    Meds: epoetin tonja (PROCRIT) Injectable 86442 Unit(s) IV Push every 7 days  ganciclovir IVPB 250 milliGRAM(s) IV Intermittent every 12 hours  imipenem/cilastatin  IVPB 500 milliGRAM(s) IV Intermittent every 6 hours  trimethoprim / sulfamethoxazole IVPB 160 milliGRAM(s) IV Intermittent daily  voriconazole IVPB 200 milliGRAM(s) IV Intermittent every 12 hours      ENDOCRINE  Capillary Blood Glucose    Meds: methylPREDNISolone sodium succinate Injectable 16 milliGRAM(s) IV Push daily  vasopressin Infusion 0.03 Unit(s)/Min IV Continuous <Continuous>      OTHER MEDICATIONS:  chlorhexidine 0.12% Liquid 15 milliLiter(s) Oral Mucosa every 12 hours  chlorhexidine 2% Cloths 1 Application(s) Topical <User Schedule>  collagenase Ointment 1 Application(s) Topical daily  CRRT Treatment    <Continuous>  FIRST- Mouthwash  BLM 10 milliLiter(s) Swish and Spit every 8 hours PRN  mupirocin 2% Ointment 1 Application(s) Topical every 12 hours  nystatin Powder 1 Application(s) Topical every 12 hours  Phoxillum Filtration BK 4 / 2.5 5000 milliLiter(s) CRRT <Continuous>  PrismaSATE Dialysate BGK 4 / 2.5 5000 milliLiter(s) CRRT <Continuous>  PrismaSOL Filtration BGK 0 / 2.5 5000 milliLiter(s) CRRT <Continuous>

## 2025-01-14 NOTE — PROGRESS NOTE ADULT - SUBJECTIVE AND OBJECTIVE BOX
PINK SURGERY DAILY PROGRESS NOTE    24 HOUR SICU EVENTS:  - 3u PRBC for hgb 6.6  - 1PLT, 1 Cryo per TEG  - Trach collar placed  - Repeat BC ordered  - Bactrim dose increased to treatment dose    SUBJECTIVE:  Patient seen and examined at bedside during morning rounds with Dr. Monae.    Vital Signs Last 24 Hrs  T(C): 37.3 (14 Jan 2025 07:00), Max: 37.3 (14 Jan 2025 05:00)  T(F): 99.1 (14 Jan 2025 07:00), Max: 99.1 (14 Jan 2025 05:00)  HR: 93 (14 Jan 2025 10:00) (79 - 105)  BP: --  BP(mean): --  RR: 19 (14 Jan 2025 10:00) (13 - 30)  SpO2: 96% (14 Jan 2025 10:00) (92% - 100%)    Parameters below as of 14 Jan 2025 09:15  Patient On (Oxygen Delivery Method): tracheostomy collar    O2 Concentration (%): 30    I&Os    01-13-25 @ 07:01 - 01-14-25 @ 07:00  --------------------------------------------------------  IN: 2460.5 mL / OUT: 3221 mL / NET: -760.5 mL    01-14-25 @ 07:01  - 01-14-25 @ 11:10  --------------------------------------------------------  IN: 674.3 mL / OUT: 482 mL / NET: 192.3 mL      PHYSICAL EXAM:  GENERAL: NAD  Head: NG in place with tube feeds running  Neck: Trach in place  LUNG: Equal chest rise, on vent  ABD: Soft, nondistended. Ostomy pink, bag empty.    MEDICATIONS:  albumin human 25% IVPB 50 milliLiter(s) IV Intermittent every 8 hours  albuterol/ipratropium for Nebulization 3 milliLiter(s) Nebulizer every 6 hours PRN  buDESOnide    Inhalation Suspension 0.5 milliGRAM(s) Inhalation every 12 hours  chlorhexidine 0.12% Liquid 15 milliLiter(s) Oral Mucosa every 12 hours  chlorhexidine 2% Cloths 1 Application(s) Topical <User Schedule>  collagenase Ointment 1 Application(s) Topical daily  CRRT Treatment    <Continuous>  CRRT Treatment    <Continuous>  epoetin tonja (PROCRIT) Injectable 71255 Unit(s) IV Push every 7 days  FIRST- Mouthwash  BLM 10 milliLiter(s) Swish and Spit every 8 hours PRN  ganciclovir IVPB 250 milliGRAM(s) IV Intermittent every 12 hours  HYDROmorphone  Injectable 0.25 milliGRAM(s) IV Push every 3 hours PRN  imipenem/cilastatin  IVPB 500 milliGRAM(s) IV Intermittent every 6 hours  insulin lispro (ADMELOG) corrective regimen sliding scale   SubCutaneous every 6 hours  levoFLOXacin IVPB 500 milliGRAM(s) IV Intermittent every 24 hours  melatonin 5 milliGRAM(s) Oral at bedtime  methylPREDNISolone sodium succinate Injectable 16 milliGRAM(s) IV Push daily  metoprolol tartrate Injectable 5 milliGRAM(s) IV Push every 6 hours  midodrine 20 milliGRAM(s) Oral every 8 hours  mupirocin 2% Ointment 1 Application(s) Topical every 12 hours  nystatin Powder 1 Application(s) Topical every 12 hours  pantoprazole  Injectable 40 milliGRAM(s) IV Push every 12 hours  phenylephrine    Infusion 0.2 MICROgram(s)/kG/Min IV Continuous <Continuous>  Phoxillum Filtration BK 4 / 2.5 5000 milliLiter(s) CRRT <Continuous>  PrismaSATE Dialysate BGK 4 / 2.5 5000 milliLiter(s) CRRT <Continuous>  PrismaSATE Dialysate BGK 4 / 2.5 5000 milliLiter(s) CRRT <Continuous>  PrismaSOL Filtration BGK 0 / 2.5 5000 milliLiter(s) CRRT <Continuous>  PrismaSOL Filtration BGK 0 / 2.5 5000 milliLiter(s) CRRT <Continuous>  PrismaSOL Filtration BGK 0 / 2.5 5000 milliLiter(s) CRRT <Continuous>  QUEtiapine 25 milliGRAM(s) Oral at bedtime  trimethoprim / sulfamethoxazole IVPB 300 milliGRAM(s) IV Intermittent every 8 hours  vasopressin Infusion 0.03 Unit(s)/Min IV Continuous <Continuous>  voriconazole IVPB 200 milliGRAM(s) IV Intermittent every 12 hours      LABS:                        9.6    2.33  )-----------( 23       ( 14 Jan 2025 06:15 )             27.9     01-14    138  |  102  |  20  ----------------------------<  157[H]  4.2   |  17[L]  |  <0.30[L]    Ca    9.0      14 Jan 2025 06:15  Phos  2.9     01-14  Mg     2.4     01-14    TPro  4.2[L]  /  Alb  3.5  /  TBili  27.7[H]  /  DBili  x   /  AST  54[H]  /  ALT  29  /  AlkPhos  109  01-14    PT/INR - ( 14 Jan 2025 06:15 )   PT: 30.1 sec;   INR: 2.64 ratio      PTT - ( 14 Jan 2025 06:15 )  PTT:70.8 sec    Urinalysis Basic - ( 14 Jan 2025 06:15 )  Color: x / Appearance: x / SG: x / pH: x  Gluc: 157 mg/dL / Ketone: x  / Bili: x / Urobili: x   Blood: x / Protein: x / Nitrite: x   Leuk Esterase: x / RBC: x / WBC x   Sq Epi: x / Non Sq Epi: x / Bacteria: x

## 2025-01-14 NOTE — PROGRESS NOTE ADULT - ASSESSMENT
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 Post op course complicated by acute hypoxic respiratory failure requiring intubation multiple times, most recently on 12/7 with conversion to tracheostomy on 12/17, e.coli bacteremia with RTOR on 12/7 for concern for worsening septic shock and cardiogenic shock s/p balloon pump placement and total abdominal colectomy in setting of ischemic bowel s/p OR on 12/9 for ileostomy creation, and olirugirc MORAIMA requiring CRRT and now intermittent HD.    Diagnosed with pneumonia secondary to Stenotrophomonas    Also with growth of Enterococcus faecalis from abdominal drains and urine culture    More fever and shock event on 12/29/24 likely 2/2 GIB    UA (12/19) 2 WBC  BCx (12/23) Clostridium paraputrificum  Bronch Cx (12/23) NGTD    CT Chest (12/23) Bibasilar groundglass and tree in bud opacities which may represent distal airway impaction versus pneumonia.    CT A/P (12/23) Peripheral wedge-shaped areas of hypoattenuation involving the superior aspect of the spleen, suggestive of splenic infarcts (12-59). Mildly dilated loops of proximal small bowel without transition point, likely ileus.    Testicular US (12/23) No appreciable arterial flow with very limited venous flow in in the testes bilaterally. Interval decrease in diffuse scrotal edema. Small bilateral hydroceles.    CT Pelvis (12/25) Moderate diffuse subcutaneous/scrotal edema without defined collection or subcutaneous air.    BCX (12/28): Gram variable rods (Blood PCR neg)    Antibiotic Course:  Meropenem ( 11/22-11/29, 11/22-11/29, 12/16-)   Minocycline (12/21-1/1)  Bactrim (11/16-11/24, 12/8-12/11, 12/21-)  Fluconazole (11/16-12/2, 12/12-12/16)   Caspofungin ( 12/6-12/11, 12/17-)  Cefepime (12/10-12/16)   Metronidazole (12/10-12/14)   Ganciclovir (12/7-12/13)   Linezolid (11/23-11/26, 12/6-12/11, 12/28-12/29)   Atovaquone (11/29-12/6)   Zosyn (11/20-11/22,12/6)   Tobramycin (12/6)   Valganciclovir (11/6-12/4)    #Positive Blood Culture (12/23 Clostridium paraputrificum) (12/28: Gram variable rods -Blood PCR neg)  Clostridium paraputrificum is generally penicillin and metronidazole susceptible      #Leukocytosis, Fever, Shock, Transaminitis,   #Stenotrophomonas tracheo/Pneumonia? s/p minocycline course and now with steontrophomonas bacteremia  # Prior polymicrobial bacteremia E faecalis; Clostridium spp in c/o Gi pathology but also necrotic scrotum     # 1/11-12 ; requiring pressors again, poor ostomy output, new cavitary chest lesion seen on ct  sent blood cultures x2 sets and growing stenotrophomonas in one set  sent sputum from trach with mixed charity but repsiratory tract is steno source and aeration on CXR appears worse  Send fungitell  send galactomanan  added voriconazole 200mg iV q 12hr for cavitary pneumonia  send Voriconazole trough after steady state is reached  Stenotrophomonas bacteremia-  Bactrim plus Levaquin in CVVH dosing started as he has failed minocycline  -Decrease immunosuppression to as low as feasible  -Given the cavitation and since we cannot exclude additional opportunistic infection favor pursuing bronchoscopy for deeper respiratory cultures if safe / feasible.    #Liver Transplant Recipient, Prophylactic Antibiotic  --Restarted IV ganciclovir in induction dosing adjusted for dialysis  Cytomegalovirus By PCR: Det <34.5 IU/mL (12.23.24 @ 06:15)  Cytomegalovirus By PCR: 537 IU/mL (12.31.24 @ 13:36)  Cytomegalovirus By PCR: 1240 IU/mL (01.06.25 @ 00:25)  Cytomegalovirus By PCR: 1280 IU/mL (01.12.25 @ 17:45)    CMV resistance testing sent on 1/14  may need to switch to foscarnet for suspected resistance  received a dose of IVIG for hypogammaglobulinemia    prognosis remains very poor wiht multiple infections despite minimal immunosuppressive medications     I will continue to follow. Please feel free to contact me with any further questions.    Kyle Junior M.D.  [facility] [place] [facility]  8AM-5PM Monday - Friday: Available on [employer] Teams  After Hours and Holidays (or if no response on [employer] Teams): Please contact the Infectious Diseases Office at [phone]    The above assessment and plan were discussed with SICU Team

## 2025-01-14 NOTE — PROGRESS NOTE ADULT - ASSESSMENT
ASSESSMENT: 74 male w/ a PMHx of HTN, DM, AF, BPH, MASH cirrhosis and HCC s/p OLT on 11/15. Case was uneventful but post-op course has been prolonged and c/b delirium, aspiration, multiple episodes of acute hypoxic respiratory failure requiring reintubation from 11/20-11/24 & 11/24-11/26, AF w/ RVR, ileus requiring NGT, distended colon requiring rectal tube, dysphagia, malnutrition, hypernatremia, fevers, pancytopenia, poorly controlled glucoses, and left brachial VTE. Patient went into severe refractory septic shock on 12/6 w/ blood cultures positive for E. coli s/p s/p ex lap, total abd colectomy, end ileostomy, 3 intentionally retained laparotomy pads for bleeding, Abthera, IABP placement w/ admission to CTICU, Patient required inotropes, Jacqui, and CRRT post-op. s/p closure 12/9. IABP removed 12/10 and transferred back to SICU 12/13. SICU course c/b narrow complex arrythmia w/ ECG showing new ST elevations concerning for posterior wall MI vs vasospasms, cards consulted and started on heparin gtt for empiric ACS tx which was c/b GIB then transitioned to argatroban c/b bleed. TTE revealed LVOT obstruction & TODD.     PLAN:    Neuro:  - Pain: PRN dilaudid (0.5)  - holding Seroquel and melatonin d/t poor mental status  - Opens eyes intermittently, intermittently following commands, off sedation (more awake 1/13)  - CT head 11/19, 11/21, 11/25, 11/29, 12/5, 12/20, 1/11 negative  - EEG: Mild diffuse cerebral dysfunction that is not specific in etiology. No epileptic discharges recorded. No seizures recorded.  - Holding home xanax, Abilify, Zoloft, Remeron, Lexapro  - No need for MRI    Resp:  - S/p bedside tracheostomy 12/17  - trach collar daily, CPAP overnight  - c/w chest PT  - c/w inhaled bronchodilator  - c/w suctioning PRN    CV:  - on kassandra, vaso  - holding midodrine while NPO  - IV Metoprolol 5mg q6hr  - IABP removed 12/10  - TTE 12/6 w/ LVOT obstruction & TODD  - TTE 12/11 shows EF of 55-60%    GI:  - trend LFTs  - NPO, NGT (potentially start trickle feeds 1/13)  - all anti-motility agents held  - protonix BID  - monitor ALYSSA output  - CT 1/11: no obstruction, enteritis, foci of air concerning for early SBO    /Renal:  - CRRT negative (10-25) if okay with transplant  50q8 albumin  - Monitor BUN/Cr, I&Os, trend UOP  - Monitor scrotal necrosis, maintain nichols at all times  - Bladder scan q12    Heme:  - trend H/H, PLTs, coags  - hep gtt and argatroban gtt held i/s/o bleed  - PLEX x4, last session 1/9    ID:  - ABX: Voriconazole, imipenem bactrim, levaquin  - Ganciclovir for CMV   - holding immunosuppresion 2/2 cavitary lesion on CT chest  - Tracheal aspirate -> Stenotrophomas maltophilia  - UCx 12/16 positive, Combi cath 12/19 positive  - BCx positive for clostridium paraputrificum 12/28  - BCx 01/13 positive for stenotrophomonoas maltophilia  - Mouthwash for mouth ulcers  - C diff and GI stool PCR negative  - ID->anticipate 14 total days antibiotics  - Bactrim dose escalatated to treatment dose    Endo:  - monitor glucose   - methylprednisone 16 qd    Code Status: full code    Disposition: BON aTveras PA-C     Please call [phone] after 6am Your child was seen in the ED today for your nausea and comiting  At this time it is likely that your child has a viral syndrome with nausea and vomiting  I have sent a prescription to your pharmacy for zofran which you can use 1 mL twice a day for the nausea  Please follow up with your pediatrician within 48 hours  Return to the ED if you have any concerns  Ruiz hijo fue visto en el servicio de urgencias hoy por hemal náuseas y comezón  En mj momento, es probable que ruiz hijo tenga un síndrome viral con náuseas y vómitos  He enviado jaymie receta a ruiz farmacia de zofran, que puede usar 1 ml [address] para las náuseas  Aly un seguimiento con ruiz pediatra dentro de las 48 horas  Regrese al servicio de urgencias si tiene alguna inquietud

## 2025-01-14 NOTE — PROGRESS NOTE ADULT - NS ATTEND AMEND GEN_ALL_CORE FT
worsening sepsis on 2 pressors  cont abx for bacteremia and voriconazole for left lung cavitary lesion  Immunosuppression - Cyclosporine and MMF held. Cont solumedrol 16mg daily  cont trickle feeds via NGT

## 2025-01-14 NOTE — PROGRESS NOTE ADULT - SUBJECTIVE AND OBJECTIVE BOX
Transplant Surgery - Multidisciplinary Rounds  --------------------------------------------------------------  OLT   11/15/2024         Colectomy 12/7/2024     IABP 12/7 removed 12/10  Tracheostomy 12/17/2024    Present:   Patient seen and examined with multidisciplinary Transplant team including Surgeon: Dr. Palomino, Hepatologist Dr. Hidalgo , JAZZ Fisher and bedside RN in AM rounds.   Disciplines not in attendance will be notified of the plan.     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   s/p OLT 11/15 with post op course c/b:  ·	Hypoxia: Reintubated 11/20, extubated 11/24->reintubated 11/24, extubated 11/26, reintubated 12/7, trached 12/17  ·	Ileus   ·	A fib  ·	AMS/Seizure   ·	L brachial DVT  ·	Neutropenia  ·	E coli Bacteremia (12/6)  ·	s/p Colectomy 12/7.   ·	IABP 12/7, removed 12/10  ·	Ec Faecalis UTI (12/16)  ·	Stenotrophamonas in trach aspirate (12/19)  ·	Clostridium in blood 12/23  ·	UGIB 12/26  ·	CMV Viremia  ·	MORAIMA requiring CRRT  ·	Shock liver    Interval/Overnight Events:   - afebrile, on kassandra and vaso  - held cyclo 2/2 voriconazole   - s/p 3U PRBC, 1plt, 1 cryo per TEG     Immunosuppression:  - Cyclo HELD, MMF HELD, solumed 16  -ongoing monitoring for signs of rejection    TX DATA HERE    ROS: Unable to assess patient is lethargic, s/p tracheostomy    PHYSICAL EXAM:   Constitutional:  awake  Eyes:  PERRLA, Scleral icterus  ENMT: nc/at, no thrush, NGT  Neck: supple, trach   Respiratory: CTA B/L  Cardiovascular: RRR  Gastrointestinal: incision clean/dry/intact + Ostomy dusky low/no output, wound vac, peritoneal drains x1 bilious   Genitourinary: +scrotal edema w/ large fluid collection; black/green discoloration  Extremities: SCD's in place and working bilaterally, + LE edema  Neurological: opens eyes to voice   Skin: large sacral decub, poor healing with breakdown Transplant Surgery - Multidisciplinary Rounds  --------------------------------------------------------------  OLT   11/15/2024         Colectomy 12/7/2024     IABP 12/7 removed 12/10  Tracheostomy 12/17/2024    Present:   Patient seen and examined with multidisciplinary Transplant team including Surgeon: Dr. Palomino, Hepatologist Dr. Hidaglo , JAZZ Fisher/Ale and bedside RN in AM rounds.   Disciplines not in attendance will be notified of the plan.     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   s/p OLT 11/15 with post op course c/b:  ·	Hypoxia: Reintubated 11/20, extubated 11/24->reintubated 11/24, extubated 11/26, reintubated 12/7, trached 12/17  ·	Ileus   ·	A fib  ·	AMS/Seizure   ·	L brachial DVT  ·	Neutropenia  ·	E coli Bacteremia (12/6)  ·	s/p Colectomy 12/7.   ·	IABP 12/7, removed 12/10  ·	Ec Faecalis UTI (12/16)  ·	Stenotrophamonas in trach aspirate (12/19)  ·	Clostridium in blood 12/23  ·	UGIB 12/26  ·	CMV Viremia  ·	MORAIMA requiring CRRT  ·	Shock liver    Interval/Overnight Events:   - afebrile, on kassandra and vaso  - held cyclo 2/2 voriconazole   - s/p 3U PRBC, 1plt, 1 cryo per TEG     Immunosuppression:  - Cyclo HELD, MMF HELD, solumed 16  -ongoing monitoring for signs of rejection      MEDICATIONS  (STANDING):  albumin human 25% IVPB 50 milliLiter(s) IV Intermittent every 8 hours  buDESOnide    Inhalation Suspension 0.5 milliGRAM(s) Inhalation every 12 hours  chlorhexidine 0.12% Liquid 15 milliLiter(s) Oral Mucosa every 12 hours  chlorhexidine 2% Cloths 1 Application(s) Topical <User Schedule>  collagenase Ointment 1 Application(s) Topical daily  CRRT Treatment    <Continuous>  epoetin tonja (PROCRIT) Injectable 06294 Unit(s) IV Push every 7 days  ganciclovir IVPB 250 milliGRAM(s) IV Intermittent every 12 hours  imipenem/cilastatin  IVPB 500 milliGRAM(s) IV Intermittent every 6 hours  insulin lispro (ADMELOG) corrective regimen sliding scale   SubCutaneous every 6 hours  levoFLOXacin IVPB 500 milliGRAM(s) IV Intermittent every 24 hours  melatonin 5 milliGRAM(s) Oral at bedtime  methylPREDNISolone sodium succinate Injectable 16 milliGRAM(s) IV Push daily  metoprolol tartrate Injectable 5 milliGRAM(s) IV Push every 6 hours  midodrine 20 milliGRAM(s) Oral every 8 hours  mupirocin 2% Ointment 1 Application(s) Topical every 12 hours  nystatin Powder 1 Application(s) Topical every 12 hours  pantoprazole  Injectable 40 milliGRAM(s) IV Push every 12 hours  phenylephrine    Infusion 0.2 MICROgram(s)/kG/Min (7.46 mL/Hr) IV Continuous <Continuous>  PrismaSATE Dialysate BGK 4 / 2.5 5000 milliLiter(s) (1500 mL/Hr) CRRT <Continuous>  PrismaSOL Filtration BGK 0 / 2.5 5000 milliLiter(s) (1200 mL/Hr) CRRT <Continuous>  PrismaSOL Filtration BGK 0 / 2.5 5000 milliLiter(s) (200 mL/Hr) CRRT <Continuous>  QUEtiapine 25 milliGRAM(s) Oral at bedtime  trimethoprim / sulfamethoxazole IVPB 300 milliGRAM(s) IV Intermittent every 8 hours  vasopressin Infusion 0.03 Unit(s)/Min (4.5 mL/Hr) IV Continuous <Continuous>  voriconazole IVPB 200 milliGRAM(s) IV Intermittent every 12 hours    MEDICATIONS  (PRN):  albuterol/ipratropium for Nebulization 3 milliLiter(s) Nebulizer every 6 hours PRN Shortness of Breath and/or Wheezing  FIRST- Mouthwash  BLM 10 milliLiter(s) Swish and Spit every 8 hours PRN Mouth Care  HYDROmorphone  Injectable 0.25 milliGRAM(s) IV Push every 3 hours PRN Moderate Pain (4 - 6)      PAST MEDICAL & SURGICAL HISTORY:  Diabetes      Transaminitis      Paroxysmal atrial fibrillation      Depression      BPH (benign prostatic hyperplasia)      Hypertension      Chronic atrial fibrillation      Coronary artery disease      Hepatocellular carcinoma      DM (diabetes mellitus)      HTN (hypertension)      Paroxysmal atrial fibrillation      Cirrhosis      HCC (hepatocellular carcinoma)      History of BPH      History of laparoscopic cholecystectomy      History of lumbar laminectomy      H/O prior ablation treatment      H/O percutaneous left heart catheterization          Vital Signs Last 24 Hrs  T(C): 37.1 (14 Jan 2025 15:00), Max: 37.3 (14 Jan 2025 05:00)  T(F): 98.8 (14 Jan 2025 15:00), Max: 99.1 (14 Jan 2025 05:00)  HR: 91 (14 Jan 2025 16:15) (79 - 105)  BP: --  BP(mean): --  RR: 22 (14 Jan 2025 16:15) (13 - 30)  SpO2: 95% (14 Jan 2025 16:15) (92% - 100%)    Parameters below as of 14 Jan 2025 09:15  Patient On (Oxygen Delivery Method): tracheostomy collar    O2 Concentration (%): 30    I&O's Summary    13 Jan 2025 07:01  -  14 Jan 2025 07:00  --------------------------------------------------------  IN: 2460.5 mL / OUT: 3221 mL / NET: -760.5 mL    14 Jan 2025 07:01  -  14 Jan 2025 16:55  --------------------------------------------------------  IN: 1633.5 mL / OUT: 592 mL / NET: 1041.5 mL                              8.7    1.92  )-----------( 14       ( 14 Jan 2025 12:29 )             25.6     01-14    136  |  102  |  21  ----------------------------<  187[H]  4.0   |  18[L]  |  <0.30[L]    Ca    8.8      14 Jan 2025 12:29  Phos  2.9     01-14  Mg     2.4     01-14    TPro  3.7[L]  /  Alb  2.7[L]  /  TBili  25.8[H]  /  DBili  x   /  AST  53[H]  /  ALT  27  /  AlkPhos  107  01-14          Culture - Sputum (collected 01-12-25 @ 09:52)  Source: Trach Asp Tracheal Aspirate  Gram Stain (01-12-25 @ 23:20):    No polymorphonuclear leukocytes per low power field    Rare Squamous epithelial cells per low power field    Numerous Gram Negative Rods per oil power field    Few Gram Positive Cocci in Clusters per oil power field    Rare Yeast like cells per oil power field  Preliminary Report (01-14-25 @ 10:40):    Mixed gram negative rods including    Moderate Stenotrophomonas maltophilia    Commensal charity consistent with body site  Organism: Stenotrophomonas maltophilia (01-14-25 @ 09:33)  Organism: Stenotrophomonas maltophilia (01-14-25 @ 09:33)    Culture - Blood (collected 01-11-25 @ 17:50)  Source: .Blood BLOOD  Preliminary Report (01-14-25 @ 03:01):    No growth at 48 Hours    Culture - Blood (collected 01-11-25 @ 16:58)  Source: .Blood BLOOD  Gram Stain (01-13-25 @ 02:56):    Growth in aerobic bottle: Gram Negative Rods  Final Report (01-14-25 @ 16:07):    Growth in aerobic bottle: Stenotrophomonas maltophilia    Direct identification is available within approximately 3-5    hours either by Blood Panel Multiplexed PCR or Direct    MALDI-TOF. Details: [email or web address]  Organism: Blood Culture PCR  Stenotrophomonas maltophilia (01-14-25 @ 16:07)  Organism: Stenotrophomonas maltophilia (01-14-25 @ 16:07)  Organism: Stenotrophomonas maltophilia (01-14-25 @ 16:07)  Organism: Blood Culture PCR (01-14-25 @ 16:07)          ROS: Unable to assess patient is lethargic, s/p tracheostomy    PHYSICAL EXAM:   Constitutional:  awake, semi-responsive  Eyes:  PERRLA, Scleral icterus  ENMT: nc/at, no thrush, NGT  Neck: supple, trach   Respiratory: CTA B/L  Cardiovascular: RRR  Gastrointestinal: incision clean/dry/intact + Ostomy red low/no output, wound vac, peritoneal drains x1 bilious   Genitourinary: +scrotal edema w/ large fluid collection; black/green discoloration  Extremities: SCD's in place and working bilaterally, + UE/LE edema  Neurological: opens eyes to voice   Skin: large sacral decub, poor healing with breakdown

## 2025-01-14 NOTE — PROGRESS NOTE ADULT - SUBJECTIVE AND OBJECTIVE BOX
Follow Up:      Interval History:    REVIEW OF SYSTEMS  [  ] ROS unobtainable because:    [  ] All other systems negative except as noted below    Constitutional:  [ ] fever [ ] chills  [ ] weight loss  [ ] weakness  Skin:  [ ] rash [ ] phlebitis	  Eyes: [ ] icterus [ ] pain  [ ] discharge	  ENMT: [ ] sore throat  [ ] thrush [ ] ulcers [ ] exudates  Respiratory: [ ] dyspnea [ ] hemoptysis [ ] cough [ ] sputum	  Cardiovascular:  [ ] chest pain [ ] palpitations [ ] edema	  Gastrointestinal:  [ ] nausea [ ] vomiting [ ] diarrhea [ ] constipation [ ] pain	  Genitourinary:  [ ] dysuria [ ] frequency [ ] hematuria [ ] discharge [ ] flank pain  [ ] incontinence  Musculoskeletal:  [ ] myalgias [ ] arthralgias [ ] arthritis  [ ] back pain  Neurological:  [ ] headache [ ] seizures  [ ] confusion/altered mental status    Allergies  No Known Allergies        ANTIMICROBIALS:  ganciclovir IVPB 250 every 12 hours  imipenem/cilastatin  IVPB 500 every 6 hours  levoFLOXacin IVPB 500 every 24 hours  trimethoprim / sulfamethoxazole IVPB 300 every 8 hours  voriconazole IVPB 200 every 12 hours      OTHER MEDS:  MEDICATIONS  (STANDING):  albuterol/ipratropium for Nebulization 3 every 6 hours PRN  buDESOnide    Inhalation Suspension 0.5 every 12 hours  epoetin tonja (PROCRIT) Injectable 37040 every 7 days  HYDROmorphone  Injectable 0.25 every 3 hours PRN  insulin lispro (ADMELOG) corrective regimen sliding scale  every 6 hours  melatonin 5 at bedtime  methylPREDNISolone sodium succinate Injectable 16 daily  metoprolol tartrate Injectable 5 every 6 hours  midodrine 20 every 8 hours  pantoprazole  Injectable 40 every 12 hours  phenylephrine    Infusion 0.2 <Continuous>  QUEtiapine 25 at bedtime  vasopressin Infusion 0.03 <Continuous>      Vital Signs Last 24 Hrs  T(C): 37.2 (14 Jan 2025 11:00), Max: 37.3 (14 Jan 2025 05:00)  T(F): 99 (14 Jan 2025 11:00), Max: 99.1 (14 Jan 2025 05:00)  HR: 92 (14 Jan 2025 14:53) (79 - 105)  BP: --  BP(mean): --  RR: 23 (14 Jan 2025 14:45) (13 - 30)  SpO2: 95% (14 Jan 2025 14:53) (92% - 100%)    Parameters below as of 14 Jan 2025 09:15  Patient On (Oxygen Delivery Method): tracheostomy collar    O2 Concentration (%): 30    PHYSICAL EXAMINATION:  General: Alert and Awake, NAD  HEENT: PERRL, EOMI  Neck: Supple  Cardiac: RRR, No M/R/G  Resp: CTAB, No Wh/Rh/Ra  Abdomen: NBS, NT/ND, No HSM, No rigidity or guarding  MSK: No LE edema. No Calf tenderness  : No nichols  Skin: No rashes or lesions. Skin is warm and dry to the touch.   Neuro: Alert and Awake. CN 2-12 Grossly intact. Moves all four extremities spontaneously.  Psych: Calm, Pleasant, Cooperative                          8.7    1.92  )-----------( 14       ( 14 Jan 2025 12:29 )             25.6       01-14    136  |  102  |  21  ----------------------------<  187[H]  4.0   |  18[L]  |  <0.30[L]    Ca    8.8      14 Jan 2025 12:29  Phos  2.9     01-14  Mg     2.4     01-14    TPro  3.7[L]  /  Alb  2.7[L]  /  TBili  25.8[H]  /  DBili  x   /  AST  53[H]  /  ALT  27  /  AlkPhos  107  01-14      Urinalysis Basic - ( 14 Jan 2025 12:29 )    Color: x / Appearance: x / SG: x / pH: x  Gluc: 187 mg/dL / Ketone: x  / Bili: x / Urobili: x   Blood: x / Protein: x / Nitrite: x   Leuk Esterase: x / RBC: x / WBC x   Sq Epi: x / Non Sq Epi: x / Bacteria: x        MICROBIOLOGY:  v  Trach Asp Tracheal Aspirate  01-12-25   Mixed gram negative rods including  Moderate Stenotrophomonas maltophilia  Commensal charity consistent with body site  --  Stenotrophomonas maltophilia      .Blood BLOOD  01-11-25   No growth at 48 Hours  --  --      .Blood BLOOD  01-11-25   Growth in aerobic bottle: Stenotrophomonas maltophilia  Direct identification is available within approximately 3-5  hours either by Blood Panel Multiplexed PCR or Direct  MALDI-TOF. Details: [email or web address]  --  Blood Culture PCR  Stenotrophomonas maltophilia      .Blood BLOOD  01-05-25   No growth at 5 days  --  --      .Blood BLOOD  01-05-25   No growth at 5 days  --  --      .Blood BLOOD  01-02-25   No growth at 5 days  --  --      .Blood BLOOD  01-01-25   No growth at 5 days  --  --      .Blood BLOOD  12-28-24   Growth in anaerobic bottle: Clostridium paraputrificum "Susceptibilities  not performed"  Direct identification is available within approximately 3-5  hours either by Blood Panel Multiplexed PCR or Direct  MALDI-TOF. Details: [email or web address]  --  Blood Culture PCR      .Blood BLOOD  12-28-24   No growth at 5 days  --  --      Body Fluid  12-26-24   Rare Enterococcus faecalis  --  Enterococcus faecalis      Body Fluid  12-26-24   Rare Enterococcus faecalis  --  Enterococcus faecalis      .Blood BLOOD  12-24-24   No growth at 5 days  --  --      Combi-Cath  12-23-24   Commensal charity consistent with body site  --    No polymorphonuclear cells seen per low power field  No squamous epithelial cells per low power field  No organisms seen per oil power field      .Blood BLOOD  12-23-24   Growth in anaerobic bottle: Clostridium paraputrificum "Susceptibilities  not performed"  Direct identification is available within approximately 3-5  hours either by Blood Panel Multiplexed PCR or Direct  MALDI-TOF. Details: [email or web address]  --  Blood Culture PCR      Abdominal Fl  12-20-24   Rare Enterococcus faecalis  --  Enterococcus faecalis      Trach Asp Tracheal Aspirate  12-19-24   Moderate Stenotrophomonas maltophilia  Commensal charity consistent with body site  --  Stenotrophomonas maltophilia      .Blood BLOOD  12-19-24   No growth at 5 days  --  --      Catheterized Catheterized  12-16-24   10,000 - 49,000 CFU/mL Enterococcus faecalis  --  Enterococcus faecalis      .Blood BLOOD  12-16-24   No growth at 5 days  --  --      Combi-Cath  12-16-24   Commensal charity consistent with body site  --    No polymorphonuclear leukocytes seen per low power field  No Squamous epithelial cells seen per low power field  No organisms seen per oil power field      .Blood BLOOD  12-16-24   No growth at 5 days  --  --        HIV-1 RNA Quantitative, Viral Load Log: NOT DET. lg /mL (12-18-24 @ 22:14)  Toxoplasma IgG Screen: 78.00 IU/mL (11-15-24 @ 00:41)  CMV IgG Antibody: 1.50 U/mL (11-15-24 @ 00:41)    CMVPCR Log: 3.11 Qrf14FO/mL (01-12 @ 17:45)        RADIOLOGY:    <The imaging below has been reviewed and visualized by me independently. Findings as detailed in report below> Follow Up:  Shock    Interval History: afebrile. no acute events today.     REVIEW OF SYSTEMS  [ x ] ROS unobtainable because: encephalopathic.     [  ] All other systems negative except as noted below    Constitutional:  [ ] fever [ ] chills  [ ] weight loss  [ ] weakness  Skin:  [ ] rash [ ] phlebitis	  Eyes: [ ] icterus [ ] pain  [ ] discharge	  ENMT: [ ] sore throat  [ ] thrush [ ] ulcers [ ] exudates  Respiratory: [ ] dyspnea [ ] hemoptysis [ ] cough [ ] sputum	  Cardiovascular:  [ ] chest pain [ ] palpitations [ ] edema	  Gastrointestinal:  [ ] nausea [ ] vomiting [ ] diarrhea [ ] constipation [ ] pain	  Genitourinary:  [ ] dysuria [ ] frequency [ ] hematuria [ ] discharge [ ] flank pain  [ ] incontinence  Musculoskeletal:  [ ] myalgias [ ] arthralgias [ ] arthritis  [ ] back pain  Neurological:  [ ] headache [ ] seizures  [ ] confusion/altered mental status    Allergies  No Known Allergies        ANTIMICROBIALS:  ganciclovir IVPB 250 every 12 hours  imipenem/cilastatin  IVPB 500 every 6 hours  levoFLOXacin IVPB 500 every 24 hours  trimethoprim / sulfamethoxazole IVPB 300 every 8 hours  voriconazole IVPB 200 every 12 hours      OTHER MEDS:  MEDICATIONS  (STANDING):  albuterol/ipratropium for Nebulization 3 every 6 hours PRN  buDESOnide    Inhalation Suspension 0.5 every 12 hours  epoetin tonaj (PROCRIT) Injectable 11807 every 7 days  HYDROmorphone  Injectable 0.25 every 3 hours PRN  insulin lispro (ADMELOG) corrective regimen sliding scale  every 6 hours  melatonin 5 at bedtime  methylPREDNISolone sodium succinate Injectable 16 daily  metoprolol tartrate Injectable 5 every 6 hours  midodrine 20 every 8 hours  pantoprazole  Injectable 40 every 12 hours  phenylephrine    Infusion 0.2 <Continuous>  QUEtiapine 25 at bedtime  vasopressin Infusion 0.03 <Continuous>      Vital Signs Last 24 Hrs  T(C): 37.2 (14 Jan 2025 11:00), Max: 37.3 (14 Jan 2025 05:00)  T(F): 99 (14 Jan 2025 11:00), Max: 99.1 (14 Jan 2025 05:00)  HR: 92 (14 Jan 2025 14:53) (79 - 105)  BP: --  BP(mean): --  RR: 23 (14 Jan 2025 14:45) (13 - 30)  SpO2: 95% (14 Jan 2025 14:53) (92% - 100%)    Parameters below as of 14 Jan 2025 09:15  Patient On (Oxygen Delivery Method): tracheostomy collar    O2 Concentration (%): 30    PHYSICAL EXAMINATION:  General: Alert and Awake, NAD  Neck: +Trach  Cardiac: RRR, No M/R/G  Resp: CTAB, No Wh/Rh/Ra  Abdomen: +wound vac over abdomen No HSM, No rigidity or guarding  MSK: No LE edema. No Calf tenderness  : Dressing over testes  Skin: No rashes or lesions. Skin is warm and dry to the touch.   Neuro: Alert and Awake. CN 2-12 Grossly intact. Moves all four extremities spontaneously.  Psych: Calm, Pleasant, Cooperative                          8.7    1.92  )-----------( 14       ( 14 Jan 2025 12:29 )             25.6       01-14    136  |  102  |  21  ----------------------------<  187[H]  4.0   |  18[L]  |  <0.30[L]    Ca    8.8      14 Jan 2025 12:29  Phos  2.9     01-14  Mg     2.4     01-14    TPro  3.7[L]  /  Alb  2.7[L]  /  TBili  25.8[H]  /  DBili  x   /  AST  53[H]  /  ALT  27  /  AlkPhos  107  01-14      Urinalysis Basic - ( 14 Jan 2025 12:29 )    Color: x / Appearance: x / SG: x / pH: x  Gluc: 187 mg/dL / Ketone: x  / Bili: x / Urobili: x   Blood: x / Protein: x / Nitrite: x   Leuk Esterase: x / RBC: x / WBC x   Sq Epi: x / Non Sq Epi: x / Bacteria: x        MICROBIOLOGY:  v  Trach Asp Tracheal Aspirate  01-12-25   Mixed gram negative rods including  Moderate Stenotrophomonas maltophilia  Commensal charity consistent with body site  --  Stenotrophomonas maltophilia      .Blood BLOOD  01-11-25   No growth at 48 Hours  --  --      .Blood BLOOD  01-11-25   Growth in aerobic bottle: Stenotrophomonas maltophilia  Direct identification is available within approximately 3-5  hours either by Blood Panel Multiplexed PCR or Direct  MALDI-TOF. Details: [email or web address]  --  Blood Culture PCR  Stenotrophomonas maltophilia      .Blood BLOOD  01-05-25   No growth at 5 days  --  --      .Blood BLOOD  01-05-25   No growth at 5 days  --  --      .Blood BLOOD  01-02-25   No growth at 5 days  --  --      .Blood BLOOD  01-01-25   No growth at 5 days  --  --      .Blood BLOOD  12-28-24   Growth in anaerobic bottle: Clostridium paraputrificum "Susceptibilities  not performed"  Direct identification is available within approximately 3-5  hours either by Blood Panel Multiplexed PCR or Direct  MALDI-TOF. Details: [email or web address]  --  Blood Culture PCR      .Blood BLOOD  12-28-24   No growth at 5 days  --  --      Body Fluid  12-26-24   Rare Enterococcus faecalis  --  Enterococcus faecalis      Body Fluid  12-26-24   Rare Enterococcus faecalis  --  Enterococcus faecalis      .Blood BLOOD  12-24-24   No growth at 5 days  --  --      Combi-Cath  12-23-24   Commensal charity consistent with body site  --    No polymorphonuclear cells seen per low power field  No squamous epithelial cells per low power field  No organisms seen per oil power field      .Blood BLOOD  12-23-24   Growth in anaerobic bottle: Clostridium paraputrificum "Susceptibilities  not performed"  Direct identification is available within approximately 3-5  hours either by Blood Panel Multiplexed PCR or Direct  MALDI-TOF. Details: [email or web address]  --  Blood Culture PCR      Abdominal Fl  12-20-24   Rare Enterococcus faecalis  --  Enterococcus faecalis      Trach Asp Tracheal Aspirate  12-19-24   Moderate Stenotrophomonas maltophilia  Commensal charity consistent with body site  --  Stenotrophomonas maltophilia      .Blood BLOOD  12-19-24   No growth at 5 days  --  --      Catheterized Catheterized  12-16-24   10,000 - 49,000 CFU/mL Enterococcus faecalis  --  Enterococcus faecalis      .Blood BLOOD  12-16-24   No growth at 5 days  --  --      Combi-Cath  12-16-24   Commensal charity consistent with body site  --    No polymorphonuclear leukocytes seen per low power field  No Squamous epithelial cells seen per low power field  No organisms seen per oil power field      .Blood BLOOD  12-16-24   No growth at 5 days  --  --        HIV-1 RNA Quantitative, Viral Load Log: NOT DET. lg /mL (12-18-24 @ 22:14)  Toxoplasma IgG Screen: 78.00 IU/mL (11-15-24 @ 00:41)  CMV IgG Antibody: 1.50 U/mL (11-15-24 @ 00:41)    CMVPCR Log: 3.11 Wki14WM/mL (01-12 @ 17:45)        RADIOLOGY:    <The imaging below has been reviewed and visualized by me independently. Findings as detailed in report below>    < from: Xray Chest 1 View- PORTABLE-Routine (Xray Chest 1 View- PORTABLE-Routine in AM.) (01.14.25 @ 07:10) >  IMPRESSION:  Resolving pleural effusions.    < end of copied text >

## 2025-01-14 NOTE — PROGRESS NOTE ADULT - SUBJECTIVE AND OBJECTIVE BOX
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 seen and examined at bedside earlier today. Pt. remains on CRRT this morning. Pt. on trach collar.     PAST HISTORY  --------------------------------------------------------------------------------  No significant changes to PMH, PSH, FHx, SHx, unless otherwise noted    ALLERGIES & MEDICATIONS  --------------------------------------------------------------------------------  Allergies    No Known Allergies    Intolerances      Standing Inpatient Medications  albumin human 25% IVPB 50 milliLiter(s) IV Intermittent every 8 hours  buDESOnide    Inhalation Suspension 0.5 milliGRAM(s) Inhalation every 12 hours  chlorhexidine 0.12% Liquid 15 milliLiter(s) Oral Mucosa every 12 hours  chlorhexidine 2% Cloths 1 Application(s) Topical <User Schedule>  collagenase Ointment 1 Application(s) Topical daily  CRRT Treatment    <Continuous>  epoetin tonja (PROCRIT) Injectable 93046 Unit(s) IV Push every 7 days  ganciclovir IVPB 250 milliGRAM(s) IV Intermittent every 12 hours  imipenem/cilastatin  IVPB 500 milliGRAM(s) IV Intermittent every 6 hours  insulin lispro (ADMELOG) corrective regimen sliding scale   SubCutaneous every 6 hours  levoFLOXacin IVPB 500 milliGRAM(s) IV Intermittent every 24 hours  melatonin 5 milliGRAM(s) Oral at bedtime  methylPREDNISolone sodium succinate Injectable 16 milliGRAM(s) IV Push daily  metoprolol tartrate Injectable 5 milliGRAM(s) IV Push every 6 hours  midodrine 20 milliGRAM(s) Oral every 8 hours  mupirocin 2% Ointment 1 Application(s) Topical every 12 hours  nystatin Powder 1 Application(s) Topical every 12 hours  pantoprazole  Injectable 40 milliGRAM(s) IV Push every 12 hours  phenylephrine    Infusion 0.2 MICROgram(s)/kG/Min IV Continuous <Continuous>  PrismaSATE Dialysate BGK 4 / 2.5 5000 milliLiter(s) CRRT <Continuous>  PrismaSOL Filtration BGK 0 / 2.5 5000 milliLiter(s) CRRT <Continuous>  PrismaSOL Filtration BGK 0 / 2.5 5000 milliLiter(s) CRRT <Continuous>  QUEtiapine 25 milliGRAM(s) Oral at bedtime  trimethoprim / sulfamethoxazole IVPB 300 milliGRAM(s) IV Intermittent every 8 hours  vasopressin Infusion 0.03 Unit(s)/Min IV Continuous <Continuous>  voriconazole IVPB 200 milliGRAM(s) IV Intermittent every 12 hours    PRN Inpatient Medications  albuterol/ipratropium for Nebulization 3 milliLiter(s) Nebulizer every 6 hours PRN  FIRST- Mouthwash  BLM 10 milliLiter(s) Swish and Spit every 8 hours PRN  HYDROmorphone  Injectable 0.25 milliGRAM(s) IV Push every 3 hours PRN      REVIEW OF SYSTEMS  --------------------------------------------------------------------------------  Unable to obtain ROS due to current clinical status.     VITALS/PHYSICAL EXAM  --------------------------------------------------------------------------------  T(C): 37.2 (01-14-25 @ 11:00), Max: 37.3 (01-14-25 @ 05:00)  HR: 90 (01-14-25 @ 12:00) (79 - 105)  BP: --  RR: 19 (01-14-25 @ 12:00) (13 - 30)  SpO2: 96% (01-14-25 @ 12:00) (92% - 100%)  Wt(kg): --        01-13-25 @ 07:01  -  01-14-25 @ 07:00  --------------------------------------------------------  IN: 2460.5 mL / OUT: 3221 mL / NET: -760.5 mL    01-14-25 @ 07:01  -  01-14-25 @ 12:19  --------------------------------------------------------  IN: 985.8 mL / OUT: 482 mL / NET: 503.8 mL    Physical Exam:  Gen: on vent via tracheostomy. opens eyes.  HEENT: Icterus present, +NGT  Abdomen: + ileostomy with liquid brown stool, VAC dressing present   MSK: +LE edema and UE edema.   Skin: Superficial skin ulceration b/l thighs/dark necrotic appearing scrotum.   Vascular: Right IJ temporary dialysis catheter     LABS/STUDIES  --------------------------------------------------------------------------------              9.6    2.33  >-----------<  23       [01-14-25 @ 06:15]              27.9     138  |  102  |  20  ----------------------------<  157      [01-14-25 @ 06:15]  4.2   |  17  |  <0.30        Ca     9.0     [01-14-25 @ 06:15]      Mg     2.4     [01-14-25 @ 06:15]      Phos  2.9     [01-14-25 @ 06:15]    TPro  4.2  /  Alb  3.5  /  TBili  27.7  /  DBili  x   /  AST  54  /  ALT  29  /  AlkPhos  109  [01-14-25 @ 06:15]    PT/INR: PT 30.1 , INR 2.64       [01-14-25 @ 06:15]  PTT: 70.8       [01-14-25 @ 06:15]      Creatinine Trend:  SCr <0.30 [01-14 @ 06:15]  SCr <0.30 [01-14 @ 00:29]  SCr <0.30 [01-13 @ 17:51]  SCr <0.30 [01-13 @ 12:05]  SCr <0.30 [01-13 @ 06:20]              Urinalysis - [01-14-25 @ 06:15]      Color  / Appearance  / SG  / pH       Gluc 157 / Ketone   / Bili  / Urobili        Blood  / Protein  / Leuk Est  / Nitrite       RBC  / WBC  / Hyaline  / Gran  / Sq Epi  / Non Sq Epi  / Bacteria       Iron 76, TIBC Unable to calculate Test Repeated, %sat Unable to calculate Test Repeated      [01-12-25 @ 17:45]  Ferritin 867      [01-12-25 @ 17:45]  Vitamin D (25OH) <6.0      [11-21-24 @ 08:32]  TSH 0.68      [12-12-24 @ 12:27]  Lipid: chol 114, , HDL 47, LDL --      [04-24-24 @ 06:48]          IMAGING/RADIOLOGY: Reviewed.

## 2025-01-14 NOTE — CHART NOTE - NSCHARTNOTEFT_GEN_A_CORE
NUTRITION FOLLOW UP NOTE    PATIENT SEEN FOR: sicu follow up     SOURCE: [] Patient  [x] Current Medical Record  [x] RN  [] Family/support person at bedside  [x] Patient unavailable/inappropriate  [x] Other: Team     CHART REVIEWED/EVENTS NOTED.  [x] Nutrition Status:  -OLT 11/15   -CRRT   -Trach collar; intermittent trach to vent use   -Made NPO (1/11-1/13) in setting of "stopped" ostomy output   -Now re-started on trickle feeds     DIET ORDER:   Diet, NPO with Tube Feed:   Tube Feeding Modality: Nasogastric  Vital 1.5 [place] (VITAL1.5RTH)  Total Volume for 24 Hours (mL): 480  Continuous  Starting Tube Feed Rate {mL per Hour}: 10  Increase Tube Feed Rate by (mL): 10     Every 4 hours  Until Goal Tube Feed Rate (mL per Hour): 20  Tube Feed Duration (in Hours): 24  Tube Feed Start Time: 15:30 (01-13-25)  -Trickle feeds     ANTHROPOMETRICS:  Drug Dosing Weight  Height (cm): 182.9 (09 Dec 2024 10:11)  Weight (kg): 99.5 (25 Dec 2024 00:00)  BMI (kg/m2): 29.7 (25 Dec 2024 00:00)    NUTRITIONALLY PERTINENT MEDICATIONS:  MEDICATIONS  (STANDING):  albumin human 25% IVPB  ganciclovir IVPB  imipenem/cilastatin  IVPB  insulin lispro (ADMELOG) corrective regimen sliding scale  levoFLOXacin IVPB  methylPREDNISolone sodium succinate Injectable  metoprolol tartrate Injectable  midodrine  pantoprazole  Injectable  phenylephrine    Infusion  trimethoprim / sulfamethoxazole IVPB  vasopressin Infusion  voriconazole IVPB       NUTRITIONALLY PERTINENT LABS:  01-14 Na138 mmol/L Glu 157 mg/dL[H] K+ 4.2 mmol/L Cr  <0.30 mg/dL[L] BUN 20 mg/dL 01-14 Phos 2.9 mg/dL 01-14 Alb 3.5 g/dL01-14 ALT 29 U/L AST 54 U/L[H] Alkaline Phosphatase 109 U/L      NUTRITIONALLY PERTINENT MEDICATIONS/LABS:  [x] Reviewed  [x] Relevant notes on medications/labs:  -Phylephrine/Vaso   -Solu-medrol   -Sliding scale insulin  -Albumin     EDEMA:  [x] Reviewed  [] Relevant notes:    GI/ I&O:  [x] Reviewed  -Ostomy: 30cc (1/13)     SKIN:   [x] Pressure injury previously noted  -sacrum: unstageable  -R upper back: suspected deep tissue injury       ESTIMATED NEEDS:  [x] No change:  Energy:  2178-2582kcal/day (27-32 kcal/kg)  Protein:  97-121g/day (1.2-1.5 g/kg)  Fluid:   ml/day or [X] defer to team  Based on: IBW of  80.7kg     NUTRITION DIAGNOSIS:  [X] Prior Dx:  1. Increased protein-energy needs   2. Severe malnutrition (Acute)    EDUCATION:  [] Yes:  [x] Not appropriate/warranted    NUTRITION CARE PLAN:  1. Diet: when medically feasible:   - EN: Vital 1.5 @ 65mL x 24hrs providing 1560mL of formula, 2340kcal/day (29kcal/kg), 105g protein/day (1.3g/kg), 1192mL free water - based on 80.7kg   2. Supplements: Alfredo BID   3. Multivitamin/mineral supplementation: Nephro-kole     MONITORING AND EVALUATION:   RD remains available upon request and will follow up per protocol.    Malorie Pike, MS, RDN, CDN (Teams)   Available on [place] TEAMS

## 2025-01-14 NOTE — PROGRESS NOTE ADULT - SUBJECTIVE AND OBJECTIVE BOX
24 HOUR EVENTS:  - 3u PRBC for hgb 6.6  - 1PLT, 1 Cryo per TEG  - Trach collar placed  - Repeat BC ordered  - Bactrim dose increased to treatment dose      NEURO  Exam: follows commands  Meds: HYDROmorphone  Injectable 0.5 milliGRAM(s) IV Push once  HYDROmorphone  Injectable 0.25 milliGRAM(s) IV Push every 3 hours PRN Moderate Pain (4 - 6)      RESPIRATORY  RR: 13 (01-14-25 @ 00:16) (13 - 24)  SpO2: 97% (01-14-25 @ 00:16) (91% - 100%)  Exam: trach  Mechanical Ventilation: Mode: AC/ CMV (Assist Control/ Continuous Mandatory Ventilation), RR (machine): 14, RR (patient): 19, TV (machine): 470, FiO2: 40, PEEP: 5, ITime: 1, MAP: 7.7, PIP: 14  ABG - ( 13 Jan 2025 17:40 )  pH: 7.36  /  pCO2: 38    /  pO2: 111   / HCO3: 22    / Base Excess: -3.6  /  SaO2: 99.2        Meds: albuterol/ipratropium for Nebulization 3 milliLiter(s) Nebulizer every 6 hours PRN Shortness of Breath and/or Wheezing  buDESOnide    Inhalation Suspension 0.5 milliGRAM(s) Inhalation every 12 hours      CARDIOVASCULAR  HR: 79 (01-14-25 @ 00:16) (79 - 133)  ABP: 85/42 (01-14-25 @ 00:16) (80/40 - 112/54)  ABP(mean): 58 (01-14-25 @ 00:16) (55 - 80)      Exam:   Cardiac Rhythm:   Perfusion     [x ]Adequate   [ ]Inadequate  Mentation   [ ]Normal       [x ]Reduced  Extremities  [ x]Warm         [ ]Cool  Volume Status [ ]Hypervolemic [ x]Euvolemic [ ]Hypovolemic  Meds: metoprolol tartrate Injectable 5 milliGRAM(s) IV Push every 6 hours  phenylephrine    Infusion 0.2 MICROgram(s)/kG/Min IV Continuous <Continuous>      GI/NUTRITION  Exam: soft, NT/ND  Diet: tube feeds  Meds: pantoprazole  Injectable 40 milliGRAM(s) IV Push every 12 hours      GENITOURINARY  I&O's Detail    01-12 @ 07:01 - 01-13 @ 07:00  --------------------------------------------------------  IN:    Albumin 25%  -  50 mL: 100 mL    Cryoprecipitate: 75 mL    Enteral Tube Flush: 60 mL    IV PiggyBack: 200 mL    IV PiggyBack: 1425 mL    Phenylephrine: 410.7 mL    Platelets - Single Donor: 225 mL    PRBCs (Packed Red Blood Cells): 600 mL    Vasopressin: 108 mL  Total IN: 3203.7 mL    OUT:    Bulb (mL): 1025 mL    Ileostomy (mL): 45 mL    Indwelling Catheter - Urethral (mL): 5 mL    Nasogastric/Oral tube (mL): 300 mL    Other (mL): 1506 mL  Total OUT: 2881 mL    Total NET: 322.7 mL      01-13 @ 07:01 - 01-14 @ 00:32  --------------------------------------------------------  IN:    IV PiggyBack: 550 mL    IV PiggyBack: 725 mL    Phenylephrine: 112.8 mL    Phenylephrine: 22.5 mL    Platelets - Single Donor: 225 mL    Vasopressin: 72 mL    Vital1.5: 120 mL  Total IN: 1827.3 mL    OUT:    Bulb (mL): 1025 mL    Ileostomy (mL): 0 mL    Indwelling Catheter - Urethral (mL): 0 mL    Nasogastric/Oral tube (mL): 0 mL    Other (mL): 1391 mL  Total OUT: 2416 mL    Total NET: -588.7 mL          01-13    137  |  103  |  20  ----------------------------<  136[H]  4.0   |  19[L]  |  <0.30[L]    Ca    8.9      13 Jan 2025 17:51  Phos  2.9     01-13  Mg     2.4     01-13    TPro  4.2[L]  /  Alb  3.4  /  TBili  27.0[H]  /  DBili  x   /  AST  53[H]  /  ALT  24  /  AlkPhos  99  01-13    Meds: albumin human 25% IVPB 50 milliLiter(s) IV Intermittent every 8 hours      HEMATOLOGIC  Meds:                         9.2    2.19  )-----------( 27       ( 13 Jan 2025 17:51 )             26.9     PT/INR - ( 13 Jan 2025 17:51 )   PT: 30.1 sec;   INR: 2.64 ratio         PTT - ( 13 Jan 2025 17:51 )  PTT:65.5 sec    INFECTIOUS DISEASES  T(C): 36.5 (01-13-25 @ 22:00), Max: 37 (01-13-25 @ 05:00)  Wt(kg): --  WBC Count: 2.19 K/uL (01-13 @ 17:51)  WBC Count: 2.66 K/uL (01-13 @ 12:05)  WBC Count: 2.07 K/uL (01-13 @ 06:19)  WBC Count: 1.83 K/uL (01-13 @ 00:47)    Recent Cultures:  Specimen Source: Trach Asp Tracheal Aspirate, 01-12 @ 09:52; Results   Commensal charity consistent with body site[!]; Gram Stain:   No polymorphonuclear leukocytes per low power field  Rare Squamous epithelial cells per low power field  Numerous Gram Negative Rods per oil power field  Few Gram Positive Cocci in Clusters per oil power field  Rare Yeast like cells per oil power field[!]; Organism: --  Specimen Source: .Blood BLOOD, 01-11 @ 17:50; Results   No growth at 24 hours; Gram Stain: --; Organism: --  Specimen Source: .Blood BLOOD, 01-11 @ 16:58; Results   Growth in aerobic bottle: Stenotrophomonas maltophilia  Direct identification is available within approximately 3-5  hours either by Blood Panel Multiplexed PCR or Direct  MALDI-TOF. Details: [email or web address][!]; Gram Stain:   Growth in aerobic bottle: Gram Negative Rods[!]; Organism: Blood Culture PCR[!]    Meds: epoetin tonja (PROCRIT) Injectable 01767 Unit(s) IV Push every 7 days  ganciclovir IVPB 250 milliGRAM(s) IV Intermittent every 12 hours  imipenem/cilastatin  IVPB 500 milliGRAM(s) IV Intermittent every 6 hours  levoFLOXacin IVPB 500 milliGRAM(s) IV Intermittent every 24 hours  trimethoprim / sulfamethoxazole IVPB 300 milliGRAM(s) IV Intermittent every 8 hours  voriconazole IVPB 200 milliGRAM(s) IV Intermittent every 12 hours      ENDOCRINE  Capillary Blood Glucose    Meds: methylPREDNISolone sodium succinate Injectable 16 milliGRAM(s) IV Push daily  vasopressin Infusion 0.03 Unit(s)/Min IV Continuous <Continuous>      ACCESS DEVICES:  [x ] Peripheral IV  [ x] Central Venous Line		[ ] R	[ ] L	[ ] IJ	[ ] Fem	[ ] SC	Placed:   [ x] Arterial Line			[ ] R	[ ] L	[ ] Fem	[ ] Rad	[ ] Ax	Placed:   [ ] PICC:					[ ] Mediport  [x ] Urinary Catheter, Date Placed:   [ ] Necessity of urinary, arterial, and venous catheters discussed    OTHER MEDICATIONS:  chlorhexidine 0.12% Liquid 15 milliLiter(s) Oral Mucosa every 12 hours  chlorhexidine 2% Cloths 1 Application(s) Topical <User Schedule>  collagenase Ointment 1 Application(s) Topical daily  CRRT Treatment    <Continuous>  FIRST- Mouthwash  BLM 10 milliLiter(s) Swish and Spit every 8 hours PRN  mupirocin 2% Ointment 1 Application(s) Topical every 12 hours  nystatin Powder 1 Application(s) Topical every 12 hours  Phoxillum Filtration BK 4 / 2.5 5000 milliLiter(s) CRRT <Continuous>  PrismaSATE Dialysate BGK 4 / 2.5 5000 milliLiter(s) CRRT <Continuous>  PrismaSOL Filtration BGK 0 / 2.5 5000 milliLiter(s) CRRT <Continuous>      IMAGING:

## 2025-01-14 NOTE — PROGRESS NOTE ADULT - ASSESSMENT
74 severely ill male s/p OLT 11/2024 w prolonged hospital course requiring total abdominal colectomy and end ileostomy and eventual tracheostomy w prolonged SICU stay for continued hemodynamic instability and on-going possible sepsis vs liver failure. Colorectal surgery initially consulted for colectomy assistance and then high ileostomy output. Now, reconsulted for decreased output concerning for obstruction. Patient with worsening hemodynamic status requiring increased amounts of vasopressors. CT performed yesterday.       Plan:  -Patient is a very poor surgical candidate. Would not tolerate an exploration. No plans for surgical intervention at this time  -Monitor and record ostomy ouptut  -Continue tube feeds via NG as tolerated  -Imodium/lomotil discontinued   -Recommend Ostomy RN for stoma intubation  -Appreciate SICU care    [facility]  Pager # [phone]

## 2025-01-14 NOTE — PROGRESS NOTE ADULT - NS ATTEND AMEND GEN_ALL_CORE FT
s/p OLT 11/15 with post op course c/b:  Hypoxia: Reintubated 11/20, extubated 11/24->reintubated 11/24, extubated 11/26, reintubated 12/7, trached 12/17  Ileus   A fib  AMS/Seizure   L brachial DVT  Neutropenia  E coli Bacteremia (12/6)  s/p Colectomy 12/7.   IABP 12/7, removed 12/10  Ec Faecalis UTI (12/16)  Stenotrophamonas in trach aspirate (12/19)  Clostridium in blood 12/23  UGIB 12/26  CMV Viremia  MORAIMA requiring CRRT  Shock liver    Arousable, off sedatives   Tolerated TC during day, full vent support at night, will try PS at night  Titrate Aldo, Vaso, avoid tachycardia  Tolerating trophic feed, stoma output minimal  CRRT with net even, will change duration to nocturnal 12 hrs  Abx Meropenem, Voriconazole for possible aspergillus, , Rx dose Bactrim for Stenotrophomonas   Intermittent alb  Transfe Cryo for low Fibrinogen,  mech DVT PPx    Prognosis remains very poor  Family kept updated at bed side

## 2025-01-14 NOTE — PROGRESS NOTE ADULT - ASSESSMENT
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   s/p OLT 11/15 with complicated post op course    [] Septic shock/Cardiogenic shock  [] s/p Colectomy 12/7   [] RTOR for closure and Ileostomy creation   [] IAPB 12/7- removed 12/10  -> E Coli Bacteremia 12/6  -> Ec faecalis UTI (12/16)  -> Stenotrophaonas in trach apirate (12/19)  -> Ec Faecalis in Asc fluid (12/20) (12/26)  -> Clostridium paraputrifucum in blood 12/23, cultures have since cleared  - Tx ID following - on Imipenem/Bactrim DS/Voriconazole  - 1/1 CT CAP: small pleural effusions, enteritis, rest ok  - all lines changed over 1/3  - Wound care for sacral decubitus ulcer  - CT chest/abd/pelvis 1/11 showed apical cavitating lesions likely fungal ID switched Caspo to Voriconazole  - 1/11 Blood Cx + Stenotrophaonas    [] MORAIMA:   -CRRT started 12/7, stopped 12/15, resumed CRRT 12/23  - still anuric     [ ] UGIB s/p EGD (12/26) showing generalized oozing, coagulopathy  - Correct coagulopathy per SICU  - Protonix IV BID  - TF at goal    [] s/p OLT  - poor graft function, trial of plasmapheresis 1/3, 1/5, 1/7 for (3-5 days), PLEX #4 1/9,   - IVIG #1 on 1/8, #2 1/9   - PLEX and IVIG held 1/11 +1/12  - repeat liver US unchanged  - Diet: TFs to goal  - Pain management: avoid narcotics  - Strict I&Os, wound vac placed 12/10   - US: L brachial DVT (holding A/C)  - wound vac care  - ursodiol 300mgBID     [ ] Immunosuppression  - Tacrolimus stopped 11/18 in setting of AMS/Seizure, Started Cyclo 12/17  - Cyclo HELD for Voriconazole, MMF HELD, Solumedrol 32 mg daily    [] lleus   -@ home on Linzess  - imaging with distended colon, improving, (likely opioid induced)  - s/p Neostigmine x 2  - s/p Relistor, last dose 12/1  - s/p colectomy with end ileostomy  (see above)  - ileostomy with >1L output, lomotil and imodium as needed, and change tube feeds  - Colorectal following  - replace losses as able  - Ileostomy output down to 150 mL/day. HOLD antimotility drugs, digitized (fascia wnl)    [] CMV viremia  - CMV PCR (12/11 and 12/16): neg, positive CMV PCR on 12/23  - CMV viral load incr from 537 to 1240 1/6, at 1280 on 1/13, ganciclovir 250 BID    [ ] AMS  - Reintubated 11/20 Aspiration/hypoxia, extubated 11/24->[id number]--> extubated 11/26 -> [id number] -> tracheostomy 12/17 -> trach collar trials starting 12/29  - EEG 11/30 negative, Neurology following  - BH following   - off tacro  -CT Head No Cont (12/20): Age-indeterminate posterior left frontal lobe infarct.   -CT Head (12/25): Evolving subacute infarct in the left supramarginal gyrus  -repeat CTH ok, poor MS   - melatonin QHS     [ ] HTN/ pAFib  [ ] coronary vasospasm vs posterior wall MI  - remains off all a/c, failed hep gtt/argatroban due to UGIB  - Cards- plan for PCI prior to DC   - Off Amiodarone, off dobutamine  - BB as needed  - Dr. 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   s/p OLT 11/15 with complicated post op course    [] Septic shock/Cardiogenic shock  [] s/p Colectomy 12/7   [] RTOR for closure and Ileostomy creation   [] IAPB 12/7- removed 12/10  -> E Coli Bacteremia 12/6  -> Ec faecalis UTI (12/16)  -> Stenotrophaonas in trach apirate (12/19)  -> Ec Faecalis in Asc fluid (12/20) (12/26)  -> Clostridium paraputrifucum in blood 12/23, cultures have since cleared  - Tx ID following - on Imipenem/Levo/Bactrim DS/Voriconazole  - 1/1 CT CAP: small pleural effusions, enteritis, rest ok  - all lines changed over 1/3  - Wound care for sacral decubitus ulcer  - CT chest/abd/pelvis 1/11 showed apical cavitating lesions likely fungal ID switched Caspo to Voriconazole  - 1/13 Blood Cx + Stenotrophaonas    [] MORAIMA:   -CRRT started 12/7, stopped 12/15, resumed CRRT 12/23  - still anuric     [ ] UGIB s/p EGD (12/26) showing generalized oozing, coagulopathy  - Correct coagulopathy per SICU  - Protonix IV BID  - TF at goal    [] s/p OLT  - poor graft function, trial of plasmapheresis 1/3, 1/5, 1/7 for (3-5 days), PLEX #4 1/9,   - IVIG #1 on 1/8, #2 1/9   - PLEX and IVIG held 1/11 +1/12  - repeat liver US unchanged  - Diet: TFs to goal  - Pain management: avoid narcotics  - Strict I&Os, wound vac placed 12/10   - US: L brachial DVT (holding A/C)  - wound vac care  - ursodiol 300mgBID     [ ] Immunosuppression  - Tacrolimus stopped 11/18 in setting of AMS/Seizure, Started Cyclo 12/17  - Cyclo HELD for Voriconazole, MMF HELD, Solumedrol 16 mg daily    [] lleus   -@ home on Linzess  - imaging with distended colon, improving, (likely opioid induced)  - s/p Neostigmine x 2  - s/p Relistor, last dose 12/1  - s/p colectomy with end ileostomy  (see above)  - ileostomy with >1L output, lomotil and imodium as needed, and change tube feeds  - Colorectal following  - replace losses as able  - Ileostomy output down to 30 mL/day. HOLD antimotility drugs, digitized (fascia wnl)    [] CMV viremia  - CMV PCR (12/11 and 12/16): neg, positive CMV PCR on 12/23  - CMV viral load incr from 537 to 1240 1/6, at 1280 on 1/13, ganciclovir 250 BID    [ ] AMS  - Reintubated 11/20 Aspiration/hypoxia, extubated 11/24->qpthjifhcul75/24--> extubated 11/26 -> [id number]/7 -> tracheostomy 12/17 -> trach collar trials starting 12/29  - EEG 11/30 negative, Neurology following  - BH following   - off tacro  -CT Head No Cont (12/20): Age-indeterminate posterior left frontal lobe infarct.   -CT Head (12/25): Evolving subacute infarct in the left supramarginal gyrus  -repeat CTH ok, poor MS   - melatonin QHS     [ ] HTN/ pAFib  [ ] coronary vasospasm vs posterior wall MI  - remains off all a/c, failed hep gtt/argatroban due to UGIB  - Cards- plan for PCI prior to DC   - Off Amiodarone, off dobutamine  - BB as needed  - Dr. Quintanilla following    [ ] DM  - per SICU     [] Scrotal abscess   - US (12/12): 2.1 x0.9 x 3.2 cm focal, right testicle- possible abscess (12/12)  - Urology recs: CT scrotum review no debridement required  - Urology recs Derm consult for guidance on wound care   - 12/23 US doppler scrotum: no arterial flow, limited venous flow, bl hydrocele  - 12/15 CT CAP no acute changes   - Elevate scrotum w/ support Urology signed off 12/29

## 2025-01-14 NOTE — PROGRESS NOTE ADULT - ASSESSMENT
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 Post-op course c/b worsening mental status and acute hypoxic respiratory failure requiring multiple intubations/extubations, currently extubated (as of 11/26/24) and colonic ileus s/p several rounds of Relistor and Neostigmine with NGT and rectal tube currently in place now with septic shock due to ischemic bowel s/p total colectomy with ostomy creation with MORAIMA on CRRT.      1. s/p OLT 11/15/24 with oliguric MORAIMA in setting of septic shock and poor liver function.   - CT AP non-con: Bilateral renal cysts including cysts with peripheral calcification in the left kidney.  - Initiated on CRRT 12/7/24- 12/15/24 at 1AM stopped. s/p IHD 12/18/24 however required levophed.   - Has been back on CRRT but stopped 1/7/25 due to catheter malfunction. Restarted 1/8/25 at 8PM with new catheter.   - Pt anuric and hypotensive on vasopressors, continue CRRT. Will transition to Nocturnal CRRT to facilitate PT/OT and day time mobility.   - Monitor labs and I/Os. Avoid nephrotoxins including, ACE/ARB, NSAIDs, contrast, etc. Dose medications as per eGFR.     If you have any questions, please feel free to contact me:  Magaly Tello MD PGY-4  Nephrology Fellow  [employer] Teams (Preferred)/ Pager [phone]   (After 5pm or on weekends please page the on-call fellow)

## 2025-01-14 NOTE — ADVANCED PRACTICE NURSE CONSULT - RECOMMEDATIONS
Will recommend:    1. Monitor output.  2. Empty pouch when 1/3-1/2 full   3. Change pouching system every 3-4 days & prn leakage  4. Contact ostomy specialists if questions, concerns/issues .  5. Supplies: [place] 2 1/4" Ceraplus flat skin barrier (#32959), Lianne 2 1/4" drainable pouch (#29419); Accessory products:  stoma paste #[id number], stoma powder (#1386) & Cavilon No sting barrier film wipe (#5478)  Will f/u for ostomy education when appropriate. Pt remains acute, requiring SICU care.    Peristomal skin slough -  Apply Aquacel rope hydrofiber dressing cut to size followed by Comfeel Plus Transparent dressing to the dry adherent slough from 5-7 o'clock and to the area of black/brown tissue at 1 o'clock  prior to the application of skin barrier.

## 2025-01-14 NOTE — ADVANCED PRACTICE NURSE CONSULT - ASSESSMENT
Chart reviewed and events noted.   In to see patient with PT wound (Josafat Angeles) for routine vac dressing and complete ostomy pouching system changes.  Patient remain in SICU with son at the bedside.  NG tube feeds and trach collar   Son aware of the reason of the visit and gave consent for team to proceed.  Patient remains non verbal and lethargic in SICU receiving CRRT at the bedside.  Complete pouching system changed:  Stoma 1 3/8" discolored tan/yellowish on the top and dark brown/black slough/necrosis at the base circumferentially.  No bleeding from stoma. Minimal output in the pouch.  New findings - Peristomal skin noted with adherent yellow tan slough from  5-7 o'clock with a bridge of intact skin and brown/black dry adherent tissue noted at 1'oclock.  Mucocutaneous junction intact.  Adherent brown/brown tissue noted  at the base circumferentially traveling towards the OS.  Surgeon (Dr. Monae) notified of the new findings.  Creases/skin indents at 3 and 9 o'clock. Dusted the peristomal skin with stoma powder excess removed.  Dab in with Cavilon 3M no sting barrier liquid film.  Aquacel rope cut to size applied at the peristomal area followed by Comfeel Plus transparent dressing as a cover dressing.  Re pouched w/ 2 1/4" flat skin barrier cut the opening to patient's measured stoma size (cut a little larger).  Bead of stoma paste applied to skin creases to level the surface and at the back of skin barrier near opening to caulk.   Re-pouched with the  Lianne drainable pouch. Supplies and pattern  left at the bedside.  Clinical RN at the bedside and discussed the plan of care with her.  Will continue to follow up.

## 2025-01-15 NOTE — PROGRESS NOTE ADULT - SUBJECTIVE AND OBJECTIVE BOX
[facility] DIVISION OF KIDNEY DISEASES AND HYPERTENSION -- FOLLOW UP NOTE  ------------------------------------------------------------- -------------------  Chief Complaint: MORAIMA in OLT transplant    24 hour events/subjective: Pt. seen and examined at bedside earlier today. Pt. sitting up in chair on trach collar, eyes open. Not responding to commands.  On vasopressor support, off CRRT during day time.         PAST HISTORY  --------------------------------------------------------------------------------  No significant changes to PMH, PSH, FHx, SHx, unless otherwise noted    ALLERGIES & MEDICATIONS  --------------------------------------------------------------------------------  Allergies    No Known Allergies    Intolerances      Standing Inpatient Medications  albumin human 25% IVPB 50 milliLiter(s) IV Intermittent every 8 hours  buDESOnide    Inhalation Suspension 0.5 milliGRAM(s) Inhalation every 12 hours  chlorhexidine 0.12% Liquid 15 milliLiter(s) Oral Mucosa every 12 hours  chlorhexidine 2% Cloths 1 Application(s) Topical <User Schedule>  collagenase Ointment 1 Application(s) Topical daily  CRRT Treatment    <Continuous>  epoetin tonja (PROCRIT) Injectable 74096 Unit(s) IV Push every 7 days  ganciclovir IVPB 250 milliGRAM(s) IV Intermittent every 12 hours  imipenem/cilastatin  IVPB 500 milliGRAM(s) IV Intermittent every 6 hours  insulin lispro (ADMELOG) corrective regimen sliding scale   SubCutaneous every 6 hours  insulin NPH human recombinant 4 Unit(s) SubCutaneous every 6 hours  levoFLOXacin IVPB 500 milliGRAM(s) IV Intermittent every 24 hours  melatonin 5 milliGRAM(s) Oral at bedtime  methylPREDNISolone sodium succinate Injectable 16 milliGRAM(s) IV Push daily  metoprolol tartrate Injectable 5 milliGRAM(s) IV Push every 6 hours  midodrine 20 milliGRAM(s) Oral every 8 hours  mupirocin 2% Ointment 1 Application(s) Topical every 12 hours  nystatin Powder 1 Application(s) Topical every 12 hours  pantoprazole  Injectable 40 milliGRAM(s) IV Push every 12 hours  phenylephrine    Infusion 0.2 MICROgram(s)/kG/Min IV Continuous <Continuous>  Phoxillum Filtration BK 4 / 2.5 5000 milliLiter(s) CRRT <Continuous>  PrismaSATE Dialysate BGK 4 / 2.5 5000 milliLiter(s) CRRT <Continuous>  PrismaSOL Filtration BGK 4 / 2.5 5000 milliLiter(s) CRRT <Continuous>  QUEtiapine 25 milliGRAM(s) Oral at bedtime  trimethoprim / sulfamethoxazole IVPB 300 milliGRAM(s) IV Intermittent every 8 hours  vasopressin Infusion 0.03 Unit(s)/Min IV Continuous <Continuous>  voriconazole IVPB 200 milliGRAM(s) IV Intermittent every 12 hours    PRN Inpatient Medications  albuterol/ipratropium for Nebulization 3 milliLiter(s) Nebulizer every 6 hours PRN  FIRST- Mouthwash  BLM 10 milliLiter(s) Swish and Spit every 8 hours PRN  HYDROmorphone  Injectable 0.25 milliGRAM(s) IV Push every 3 hours PRN      REVIEW OF SYSTEMS  --------------------------------------------------------------------------------  Unable to obtain ROS due to current clinical status.     VITALS/PHYSICAL EXAM  --------------------------------------------------------------------------------  T(C): 36.8 (01-15-25 @ 11:00), Max: 37.3 (01-15-25 @ 05:00)  HR: 89 (01-15-25 @ 11:45) (82 - 105)  BP: --  RR: 25 (01-15-25 @ 11:45) (18 - 32)  SpO2: 96% (01-15-25 @ 11:45) (92% - 99%)  Wt(kg): --        01-14-25 @ 07:01  -  01-15-25 @ 07:00  --------------------------------------------------------  IN: 4023.4 mL / OUT: 2500 mL / NET: 1523.4 mL    01-15-25 @ 07:01  -  01-15-25 @ 12:30  --------------------------------------------------------  IN: 492.8 mL / OUT: 310 mL / NET: 182.8 mL      Physical Exam:  Gen: on vent via tracheostomy. opens eyes.  HEENT: Icterus present, +NGT  Abdomen: + ileostomy with liquid brown stool, VAC dressing present   MSK: +LE edema and UE edema, improved.  Skin: Superficial skin ulceration b/l thighs/dark necrotic appearing scrotum.   Vascular: Right IJ temporary dialysis catheter       LABS/STUDIES  --------------------------------------------------------------------------------              9.3    1.11  >-----------<  22       [01-15-25 @ 06:56]              26.6     133  |  100  |  25  ----------------------------<  216      [01-15-25 @ 06:57]  3.6   |  19  |  <0.30        Ca     8.6     [01-15-25 @ 06:57]      Mg     2.4     [01-15-25 @ 06:57]      Phos  2.5     [01-15-25 @ 06:57]    TPro  3.9  /  Alb  3.0  /  TBili  26.9  /  DBili  x   /  AST  53  /  ALT  27  /  AlkPhos  119  [01-15-25 @ 06:57]    PT/INR: PT 25.8 , INR 2.26       [01-15-25 @ 06:56]  PTT: 60.1       [01-15-25 @ 06:56]      Creatinine Trend:  SCr <0.30 [01-15 @ 06:57]  SCr <0.30 [01-15 @ 00:13]  SCr <0.30 [01-14 @ 18:11]  SCr <0.30 [01-14 @ 12:29]  SCr <0.30 [01-14 @ 06:15]        Urinalysis - [01-15-25 @ 06:57]      Color  / Appearance  / SG  / pH       Gluc 216 / Ketone   / Bili  / Urobili        Blood  / Protein  / Leuk Est  / Nitrite       RBC  / WBC  / Hyaline  / Gran  / Sq Epi  / Non Sq Epi  / Bacteria       Iron 76, TIBC Unable to calculate Test Repeated, %sat Unable to calculate Test Repeated      [01-12-25 @ 17:45]  Ferritin 867      [01-12-25 @ 17:45]  Vitamin D (25OH) <6.0      [11-21-24 @ 08:32]  TSH 0.68      [12-12-24 @ 12:27]  Lipid: chol 114, , HDL 47, LDL --      [04-24-24 @ 06:48]          IMAGING/RADIOLOGY: Reviewed.

## 2025-01-15 NOTE — PROGRESS NOTE ADULT - ASSESSMENT
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 Post op course complicated by acute hypoxic respiratory failure requiring intubation multiple times, most recently on 12/7 with conversion to tracheostomy on 12/17, e.coli bacteremia with RTOR on 12/7 for concern for worsening septic shock and cardiogenic shock s/p balloon pump placement and total abdominal colectomy in setting of ischemic bowel s/p OR on 12/9 for ileostomy creation, and olirugirc MORAIMA requiring CRRT and now intermittent HD.    Diagnosed with pneumonia secondary to Stenotrophomonas    Also with growth of Enterococcus faecalis from abdominal drains and urine culture    More fever and shock event on 12/29/24 likely 2/2 GIB    UA (12/19) 2 WBC  BCx (12/23) Clostridium paraputrificum  Bronch Cx (12/23) NGTD    CT Chest (12/23) Bibasilar groundglass and tree in bud opacities which may represent distal airway impaction versus pneumonia.    CT A/P (12/23) Peripheral wedge-shaped areas of hypoattenuation involving the superior aspect of the spleen, suggestive of splenic infarcts (12-59). Mildly dilated loops of proximal small bowel without transition point, likely ileus.    Testicular US (12/23) No appreciable arterial flow with very limited venous flow in in the testes bilaterally. Interval decrease in diffuse scrotal edema. Small bilateral hydroceles.    CT Pelvis (12/25) Moderate diffuse subcutaneous/scrotal edema without defined collection or subcutaneous air.    BCX (12/28): Gram variable rods (Blood PCR neg)    Antibiotic Course:  Meropenem ( 11/22-11/29, 11/22-11/29, 12/16-)   Minocycline (12/21-1/1)  Bactrim (11/16-11/24, 12/8-12/11, 12/21-)  Fluconazole (11/16-12/2, 12/12-12/16)   Caspofungin ( 12/6-12/11, 12/17-)  Cefepime (12/10-12/16)   Metronidazole (12/10-12/14)   Ganciclovir (12/7-12/13)   Linezolid (11/23-11/26, 12/6-12/11, 12/28-12/29)   Atovaquone (11/29-12/6)   Zosyn (11/20-11/22,12/6)   Tobramycin (12/6)   Valganciclovir (11/6-12/4)    #Positive Blood Culture (12/23 Clostridium paraputrificum) (12/28: Gram variable rods -Blood PCR neg)  Clostridium paraputrificum is generally penicillin and metronidazole susceptible      #Leukocytosis, Fever, Shock, Transaminitis,   #Stenotrophomonas tracheo/Pneumonia? s/p minocycline course and now with steontrophomonas bacteremia  # Prior polymicrobial bacteremia E faecalis; Clostridium spp in c/o Gi pathology but also necrotic scrotum     # 1/11-12 ; requiring pressors again, poor ostomy output, new cavitary chest lesion seen on ct  sent blood cultures x2 sets and growing stenotrophomonas in one set  sent sputum from trach with mixed charity but repsiratory tract is steno source and aeration on CXR appears worse  Send fungitell  send galactomanan  added voriconazole 200mg iV q 12hr for cavitary pneumonia  send Voriconazole trough after steady state is reached  Stenotrophomonas bacteremia-  Bactrim plus Levaquin in CVVH dosing started as he has failed minocycline  -Decrease immunosuppression to as low as feasible  -Given the cavitation and since we cannot exclude additional opportunistic infection favor pursuing bronchoscopy for deeper respiratory cultures if safe / feasible.  -Would stop Imipenem by tomorrow as patient has been on extended course of carbapenem and cavitary lung lesions developed on carbapenem therapy    #Liver Transplant Recipient, Prophylactic Antibiotic  --Restarted IV ganciclovir in induction dosing adjusted for dialysis  Cytomegalovirus By PCR: Det <34.5 IU/mL (12.23.24 @ 06:15)  Cytomegalovirus By PCR: 537 IU/mL (12.31.24 @ 13:36)  Cytomegalovirus By PCR: 1240 IU/mL (01.06.25 @ 00:25)  Cytomegalovirus By PCR: 1280 IU/mL (01.12.25 @ 17:45)    CMV resistance testing sent on 1/14  may need to switch to foscarnet for suspected resistance  received a dose of IVIG for hypogammaglobulinemia    prognosis remains very poor with multiple infections despite minimal immunosuppressive medications     I will continue to follow. Please feel free to contact me with any further questions.    Kyle Junior M.D.  [facility] Division of Infectious Disease  8AM-5PM Monday - Friday: Available on [employer] Teams  After Hours and Holidays (or if no response on [employer] Teams): Please contact the Infectious Diseases Office at [phone]    The above assessment and plan were discussed with SICU Team

## 2025-01-15 NOTE — PROGRESS NOTE ADULT - ASSESSMENT
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 Post-op course c/b worsening mental status and acute hypoxic respiratory failure requiring multiple intubations/extubations, currently extubated (as of 11/26/24) and colonic ileus s/p several rounds of Relistor and Neostigmine with NGT and rectal tube currently in place now with septic shock due to ischemic bowel s/p total colectomy with ostomy creation with MORAIMA on CRRT.      1. s/p OLT 11/15/24 with oliguric MORAIMA in setting of septic shock and poor liver function.   - CT AP non-con: Bilateral renal cysts including cysts with peripheral calcification in the left kidney.  - Initiated on CRRT 12/7/24- 12/15/24 at 1AM stopped. s/p IHD 12/18/24 however required levophed.   - Has been back on CRRT but stopped 1/7/25 due to catheter malfunction. Restarted 1/8/25 at 8PM with new catheter.   - Pt oliguric and hypotensive on vasopressors, continue Nocturnal CRRT (transitioned to nocturnal CRRT 1/14/25)  - Monitor labs and I/Os. Avoid nephrotoxins including, ACE/ARB, NSAIDs, contrast, etc. Dose medications as per eGFR.     If you have any questions, please feel free to contact me:  Magaly Tello MD PGY-4  Nephrology Fellow  [employer] Teams (Preferred)/ Pager [phone]   (After 5pm or on weekends please page the on-call fellow)

## 2025-01-15 NOTE — PROGRESS NOTE ADULT - ASSESSMENT
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   s/p OLT 11/15 with complicated post op course    [] Septic shock/Cardiogenic shock  [] s/p Colectomy 12/7   [] RTOR for closure and Ileostomy creation   [] IAPB 12/7- removed 12/10  -> E Coli Bacteremia 12/6  -> Ec faecalis UTI (12/16)  -> Stenotrophaonas in trach apirate (12/19)  -> Ec Faecalis in Asc fluid (12/20) (12/26)  -> Clostridium paraputrifucum in blood 12/23, cultures have since cleared  - Tx ID following - on Imipenem/Levo/Bactrim DS/Voriconazole  - 1/1 CT CAP: small pleural effusions, enteritis, rest ok  - all lines changed over 1/3  - Wound care for sacral decubitus ulcer  - CT chest/abd/pelvis 1/11 showed apical cavitating lesions likely fungal ID switched Caspo to Voriconazole  - 1/13 Blood Cx + Stenotrophaonas    [] MORAIMA:   -CRRT started 12/7, stopped 12/15, resumed CRRT 12/23  - still anuric     [ ] UGIB s/p EGD (12/26) showing generalized oozing, coagulopathy  - Correct coagulopathy per SICU  - Protonix IV BID  - TF at goal    [] s/p OLT  - poor graft function, trial of plasmapheresis 1/3, 1/5, 1/7 for (3-5 days), PLEX #4 1/9,   - IVIG #1 on 1/8, #2 1/9   - PLEX and IVIG held 1/11 +1/12  - repeat liver US unchanged  - Diet: TFs to goal  - Pain management: avoid narcotics  - Strict I&Os, wound vac placed 12/10   - US: L brachial DVT (holding A/C)  - wound vac care  - ursodiol 300mgBID     [ ] Immunosuppression  - Tacrolimus stopped 11/18 in setting of AMS/Seizure, Started Cyclo 12/17  - Cyclo HELD for Voriconazole, MMF HELD, Solumedrol 16 mg daily    [] lleus   -@ home on Linzess  - imaging with distended colon, improving, (likely opioid induced)  - s/p Neostigmine x 2  - s/p Relistor, last dose 12/1  - s/p colectomy with end ileostomy  (see above)  - ileostomy with >1L output, lomotil and imodium as needed, and change tube feeds  - Colorectal following  - replace losses as able  - Ileostomy output down to 30 mL/day. HOLD antimotility drugs, digitized (fascia wnl)    [] CMV viremia  - CMV PCR (12/11 and 12/16): neg, positive CMV PCR on 12/23  - CMV viral load incr from 537 to 1240 1/6, at 1280 on 1/13, ganciclovir 250 BID    [ ] AMS  - Reintubated 11/20 Aspiration/hypoxia, extubated 11/24->[id number]/24--> extubated 11/26 -> [id number] -> tracheostomy 12/17 -> trach collar trials starting 12/29  - EEG 11/30 negative, Neurology following  - BH following   - off tacro  -CT Head No Cont (12/20): Age-indeterminate posterior left frontal lobe infarct.   -CT Head (12/25): Evolving subacute infarct in the left supramarginal gyrus  -repeat CTH ok, poor MS   - melatonin QHS     [ ] HTN/ pAFib  [ ] coronary vasospasm vs posterior wall MI  - remains off all a/c, failed hep gtt/argatroban due to UGIB  - Cards- plan for PCI prior to DC   - Off Amiodarone, off dobutamine  - BB as needed  - Dr. 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   s/p OLT 11/15 with complicated post op course    [] Septic shock/Cardiogenic shock  [] s/p Colectomy 12/7   [] RTOR for closure and Ileostomy creation   [] IAPB 12/7- removed 12/10  -> E Coli Bacteremia 12/6  -> Ec faecalis UTI (12/16)  -> Stenotrophaonas in trach apirate (12/19)  -> Ec Faecalis in Asc fluid (12/20) (12/26)  -> Clostridium paraputrifucum in blood 12/23, cultures have since cleared  - Tx ID following - on Imipenem/Levo/Bactrim DS/Voriconazole  - 1/1 CT CAP: small pleural effusions, enteritis, rest ok  - all lines changed over 1/3  - Wound care for sacral decubitus ulcer  - CT chest/abd/pelvis 1/11 showed apical cavitating lesions likely fungal ID switched Caspo to Voriconazole  - 1/13 Blood Cx + Stenotrophaonas    [] MORAIMA:   -CRRT started 12/7, stopped 12/15, resumed CRRT 12/23, now on nocturnal CRRT negative 100cc/hr  - still anuric     [ ] UGIB s/p EGD (12/26) showing generalized oozing, coagulopathy  - Correct coagulopathy per SICU  - Protonix IV BID  - TF at goal    [] s/p OLT  - poor graft function, trial of plasmapheresis 1/3, 1/5, 1/7 for (3-5 days), PLEX #4 1/9,   - IVIG #1 on 1/8, #2 1/9   - PLEX and IVIG held 1/11 +1/12  - repeat liver US unchanged  - Diet: TFs to goal  - Pain management: avoid narcotics  - Strict I&Os, wound vac placed 12/10   - US: L brachial DVT (holding A/C)  - wound vac care  - ursodiol 300mgBID     [ ] Immunosuppression  - Tacrolimus stopped 11/18 in setting of AMS/Seizure, Started Cyclo 12/17  - Cyclo restarted 25 BID, MMF HELD, Solumedrol 16 mg daily    [] lleus   -@ home on Linzess  - imaging with distended colon, improving, (likely opioid induced)  - s/p Neostigmine x 2  - s/p Relistor, last dose 12/1  - s/p colectomy with end ileostomy  (see above)  - low ileostomy output post lomotil, restarted trickle feeds  - Colorectal following  - replace losses as able  - Ileostomy output down to 30 mL/day. HOLD antimotility drugs, digitized (fascia wnl)    [] CMV viremia  - CMV PCR (12/11 and 12/16): neg, positive CMV PCR on 12/23  - CMV viral load incr from 537 to 1240 1/6, at 1280 on 1/13, ganciclovir 250 BID    [ ] AMS  - Reintubated 11/20 Aspiration/hypoxia, extubated 11/24->[id number]/24--> extubated 11/26 -> [id number]/7 -> tracheostomy 12/17 -> trach collar trials starting 12/29  - EEG 11/30 negative, Neurology following  - BH following   - off tacro  -CT Head No Cont (12/20): Age-indeterminate posterior left frontal lobe infarct.   -CT Head (12/25): Evolving subacute infarct in the left supramarginal gyrus  -repeat CTH ok, poor MS   - melatonin QHS     [ ] HTN/ pAFib  [ ] coronary vasospasm vs posterior wall MI  - remains off all a/c, failed hep gtt/argatroban due to UGIB  - Cards- plan for PCI prior to DC   - Off Amiodarone, off dobutamine  - BB as needed  - Dr. 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, cultures have since cleared  - Tx ID following - on Imipenem/Levo/Bactrim DS/Voriconazole  - 1/1 CT CAP: small pleural effusions, enteritis, rest ok  - all lines changed over 1/3  - Wound care for sacral decubitus ulcer  - CT chest/abd/pelvis 1/11 showed apical cavitating lesions likely fungal ID switched Caspo to Voriconazole  - 1/13 Blood Cx + Stenotrophomonas    [] MORAIMA:   - CRRT started 12/7, stopped 12/15, resumed CRRT 12/23, now on nocturnal CRRT negative 100cc/hr  - still anuric     [ ] UGIB s/p EGD (12/26) showing generalized oozing, coagulopathy  - Correct coagulopathy per SICU  - Protonix  - TF at goal    [] s/p OLT  - poor graft function, trial of plasmapheresis 1/3, 1/5, 1/7 for (3-5 days), PLEX #4 1/9,   - IVIG #1 on 1/8, #2 1/9   - PLEX and IVIG held 1/11 +1/12  - repeat liver US unchanged  - Diet: restart trickle feeds  - Pain management: avoid narcotics  - Strict I&Os, wound vac placed 12/10   - US: L brachial DVT (holding A/C)  - wound vac care  - ursodiol 300mgBID     [ ] Immunosuppression  - Tacrolimus stopped 11/18 in setting of AMS/Seizure, Started Cyclo 12/17  - Cyclo per level, MMF HELD, Solumedrol 16 mg daily    [] lleus   -@ home on Linzess  - imaging with distended colon, improving, (likely opioid induced)  - s/p Neostigmine x 2  - s/p Relistor, last dose 12/1  - s/p colectomy with end ileostomy  (see above)  - low ileostomy output post lomotil, restarted trickle feeds  - Colorectal following  - replace losses as able  - Ileostomy output down to 30 mL/day. HOLD antimotility drugs, digitized (fascia wnl)    [] CMV viremia  - CMV PCR (12/11 and 12/16): neg, positive CMV PCR on 12/23  - CMV viral load incr from 537 to 1240 1/6, at 1280 on 1/13, ganciclovir    [ ] AMS  - Reintubated 11/20 Aspiration/hypoxia, extubated 11/24->[id number]/24--> extubated 11/26 -> [id number]/7 -> tracheostomy 12/17 -> trach collar trials starting 12/29  - EEG 11/30 negative, Neurology following  - BH following   - off tacro  -CT Head No Cont (12/20): Age-indeterminate posterior left frontal lobe infarct.   -CT Head (12/25): Evolving subacute infarct in the left supramarginal gyrus  -repeat CTH ok, poor MS   - melatonin QHS     [ ] HTN/ pAFib  [ ] coronary vasospasm vs posterior wall MI  - remains off all a/c, failed hep gtt/argatroban due to UGIB  - Cards- plan for PCI prior to DC   - Off Amiodarone, off dobutamine  - BB as needed  - Dr. Quintanilla following    [ ] DM  - per SICU     [] Scrotal abscess   - US (12/12): 2.1 x0.9 x 3.2 cm focal, right testicle- possible abscess (12/12)  - Urology recs: CT scrotum review no debridement required  - Urology recs Derm consult for guidance on wound care   - 12/23 US doppler scrotum: no arterial flow, limited venous flow, bl hydrocele  - 12/15 CT CAP no acute changes   - Elevate scrotum w/ support Urology signed off 12/29

## 2025-01-15 NOTE — PROGRESS NOTE ADULT - SUBJECTIVE AND OBJECTIVE BOX
Transplant Surgery - Multidisciplinary Rounds  --------------------------------------------------------------  OLT   11/15/2024         Colectomy 12/7/2024     IABP 12/7 removed 12/10  Tracheostomy 12/17/2024    Present:   Patient seen and examined with multidisciplinary Transplant team including Surgeon: Dr. Palomino, Hepatologist Dr. Hidalgo , JAZZ Fisher/Ale and bedside RN in AM rounds.   Disciplines not in attendance will be notified of the plan.     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   s/p OLT 11/15 with post op course c/b:  ·	Hypoxia: Reintubated 11/20, extubated 11/24->reintubated 11/24, extubated 11/26, reintubated 12/7, trached 12/17  ·	Ileus   ·	A fib  ·	AMS/Seizure   ·	L brachial DVT  ·	Neutropenia  ·	E coli Bacteremia (12/6)  ·	s/p Colectomy 12/7.   ·	IABP 12/7, removed 12/10  ·	Ec Faecalis UTI (12/16)  ·	Stenotrophamonas in trach aspirate (12/19)  ·	Clostridium in blood 12/23  ·	UGIB 12/26  ·	CMV Viremia  ·	MORAIMA requiring CRRT  ·	Shock liver    Interval/Overnight Events:   - afebrile, on kassandra and vaso  - inc midodrine 30mg Q8hrs   - cyclo on hold 2/2 voriconazole   - s/p 1 prbc, 2 plt, 1 cryo per TEG     Immunosuppression:  - Cyclo HELD, MMF HELD, solumed 16  -ongoing monitoring for signs of rejection    TX Data here     ROS: Unable to assess patient is lethargic, s/p tracheostomy    PHYSICAL EXAM:   Constitutional:  awake, semi-responsive  Eyes:  PERRLA, Scleral icterus  ENMT: nc/at, no thrush, NGT  Neck: supple, trach   Respiratory: CTA B/L  Cardiovascular: RRR  Gastrointestinal: incision clean/dry/intact + Ostomy red low/no output, wound vac, peritoneal drains x1 bilious   Genitourinary: +scrotal edema w/ large fluid collection; black/green discoloration  Extremities: SCD's in place and working bilaterally, + UE/LE edema  Neurological: opens eyes to voice   Skin: large sacral decub, poor healing with breakdown Transplant Surgery - Multidisciplinary Rounds  --------------------------------------------------------------  OLT   11/15/2024         Colectomy 12/7/2024     IABP 12/7 removed 12/10  Tracheostomy 12/17/2024    Present:   Patient seen and examined with multidisciplinary Transplant team including Surgeon: Dr. Palomino, Hepatologist Dr. Hidalgo , JAZZ Fisher/Jasmine and bedside RN in AM rounds.   Disciplines not in attendance will be notified of the plan.     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   s/p OLT 11/15 with post op course c/b:  ·	Hypoxia: Reintubated 11/20, extubated 11/24->reintubated 11/24, extubated 11/26, reintubated 12/7, trached 12/17  ·	Ileus   ·	A fib  ·	AMS/Seizure   ·	L brachial DVT  ·	Neutropenia  ·	E coli Bacteremia (12/6)  ·	s/p Colectomy 12/7.   ·	IABP 12/7, removed 12/10  ·	Ec Faecalis UTI (12/16)  ·	Stenotrophamonas in trach aspirate (12/19)  ·	Clostridium in blood 12/23  ·	UGIB 12/26  ·	CMV Viremia  ·	MORAIMA requiring CRRT  ·	Shock liver    Interval/Overnight Events:   - afebrile, on kassandra and vaso  - inc midodrine 30mg Q8hrs   - cyclo on hold 2/2 voriconazole   - s/p 1 prbc, 2 plt, 1 cryo per TEG     Immunosuppression:  - Cyclo HELD, MMF HELD, solumed 16  -ongoing monitoring for signs of rejection    TX Data here     ROS: Unable to assess patient is lethargic, s/p tracheostomy    PHYSICAL EXAM:   Constitutional:  awake, semi-responsive  Eyes:  PERRLA, Scleral icterus  ENMT: nc/at, no thrush, NGT  Neck: supple, trach   Respiratory: CTA B/L  Cardiovascular: RRR  Gastrointestinal: incision clean/dry/intact + Ostomy red low/no output, wound vac, peritoneal drains x1 bilious   Genitourinary: +scrotal edema w/ large fluid collection; black/green discoloration  Extremities: SCD's in place and working bilaterally, + UE/LE edema  Neurological: opens eyes to voice   Skin: large sacral decub, poor healing with breakdown Transplant Surgery - Multidisciplinary Rounds  --------------------------------------------------------------  OLT   11/15/2024         Colectomy 12/7/2024     IABP 12/7 removed 12/10  Tracheostomy 12/17/2024    Present:   Patient seen and examined with multidisciplinary Transplant team including Surgeon: Dr. Palomino, Hepatologist Dr. Hidalgo , JAZZ Fisher/Jasmine and bedside RN in AM rounds.   Disciplines not in attendance will be notified of the plan.     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   s/p OLT 11/15 with post op course c/b:  ·	Hypoxia: Reintubated 11/20, extubated 11/24->reintubated 11/24, extubated 11/26, reintubated 12/7, trached 12/17  ·	Ileus   ·	A fib  ·	AMS/Seizure   ·	L brachial DVT  ·	Neutropenia  ·	E coli Bacteremia (12/6)  ·	s/p Colectomy 12/7.   ·	IABP 12/7, removed 12/10  ·	Ec Faecalis UTI (12/16)  ·	Stenotrophamonas in trach aspirate (12/19)  ·	Clostridium in blood 12/23  ·	UGIB 12/26  ·	CMV Viremia  ·	MORAIMA requiring CRRT  ·	Shock liver    Interval/Overnight Events:   - afebrile, on kassandra and vaso  - inc midodrine 30mg Q8hrs   - cyclo on hold 2/2 voriconazole   - s/p 1 prbc, 2 plt, 1 cryo per TEG     Immunosuppression:  - Cyclo per level, MMF HELD, solumed 16  -ongoing monitoring for signs of rejection    TX Data here     ROS: Unable to assess patient is lethargic, s/p tracheostomy    PHYSICAL EXAM:   Constitutional:  awake, semi-responsive  Eyes:  PERRLA, Scleral icterus  ENMT: nc/at, no thrush, NGT  Neck: supple, trach   Respiratory: CTA B/L  Cardiovascular: RRR  Gastrointestinal: incision clean/dry/intact + Ostomy red low/no output, wound vac, peritoneal drains x1 bilious   Genitourinary: +scrotal edema w/ large fluid collection; black/green discoloration  Extremities: SCD's in place and working bilaterally, + UE/LE edema  Neurological: opens eyes to voice   Skin: large sacral decub, poor healing with breakdown Transplant Surgery - Multidisciplinary Progress Note  --------------------------------------------------------------  OLT   11/15/2024         Colectomy 12/7/2024     IABP 12/7 removed 12/10  Tracheostomy 12/17/2024    Present:   Patient seen and examined with multidisciplinary Transplant team including Surgeon: Dr. Palomino, Hepatologist Dr. Hidalgo , JAZZ Fisher/Jasmine and SICU team in AM rounds.   Disciplines not in attendance will be notified of the plan.     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   s/p OLT 11/15 with post op course c/b:  ·	Hypoxia: Reintubated 11/20, extubated 11/24->reintubated 11/24, extubated 11/26, reintubated 12/7, trached 12/17  ·	Ileus   ·	A fib  ·	AMS/Seizure   ·	L brachial DVT  ·	Neutropenia  ·	E coli Bacteremia (12/6)  ·	s/p Colectomy 12/7.   ·	IABP 12/7, removed 12/10  ·	Ec Faecalis UTI (12/16)  ·	Stenotrophamonas in trach aspirate (12/19)  ·	Clostridium in blood 12/23  ·	UGIB 12/26  ·	CMV Viremia  ·	MORAIMA requiring CRRT  ·	Shock liver    Interval/Overnight Events:   - afebrile, on kassandra and vaso  - inc midodrine 30mg Q8hrs   - nocturnal CRRT, anuric  - s/p 1 prbc, 2 plt, 1 cryo per TEG     Immunosuppression:  - Cyclo per level, MMF HELD, solumed 16  -ongoing monitoring for signs of rejection      MEDICATIONS  (STANDING):  albumin human 25% IVPB 50 milliLiter(s) IV Intermittent every 8 hours  buDESOnide    Inhalation Suspension 0.5 milliGRAM(s) Inhalation every 12 hours  chlorhexidine 0.12% Liquid 15 milliLiter(s) Oral Mucosa every 12 hours  chlorhexidine 2% Cloths 1 Application(s) Topical <User Schedule>  collagenase Ointment 1 Application(s) Topical daily  CRRT Treatment    <Continuous>  cycloSPORINE  , modified (GENGRAF) Solution 25 milliGRAM(s) Oral <User Schedule>  epoetin tonja (PROCRIT) Injectable 44433 Unit(s) IV Push every 7 days  ganciclovir IVPB 250 milliGRAM(s) IV Intermittent <User Schedule>  imipenem/cilastatin  IVPB 500 milliGRAM(s) IV Intermittent <User Schedule>  insulin lispro (ADMELOG) corrective regimen sliding scale   SubCutaneous every 6 hours  insulin NPH human recombinant 4 Unit(s) SubCutaneous every 6 hours  levoFLOXacin IVPB 500 milliGRAM(s) IV Intermittent every 24 hours  melatonin 5 milliGRAM(s) Oral at bedtime  methylPREDNISolone sodium succinate Injectable 16 milliGRAM(s) IV Push daily  metoprolol tartrate Injectable 5 milliGRAM(s) IV Push every 6 hours  midodrine 20 milliGRAM(s) Oral every 8 hours  mupirocin 2% Ointment 1 Application(s) Topical every 12 hours  nystatin Powder 1 Application(s) Topical every 12 hours  pantoprazole  Injectable 40 milliGRAM(s) IV Push every 12 hours  phenylephrine    Infusion 0.2 MICROgram(s)/kG/Min (7.46 mL/Hr) IV Continuous <Continuous>  Phoxillum Filtration BK 4 / 2.5 5000 milliLiter(s) (1200 mL/Hr) CRRT <Continuous>  PrismaSATE Dialysate BGK 4 / 2.5 5000 milliLiter(s) (1500 mL/Hr) CRRT <Continuous>  PrismaSOL Filtration BGK 4 / 2.5 5000 milliLiter(s) (200 mL/Hr) CRRT <Continuous>  QUEtiapine 25 milliGRAM(s) Oral at bedtime  trimethoprim / sulfamethoxazole IVPB 150 milliGRAM(s) IV Intermittent <User Schedule>  trimethoprim / sulfamethoxazole IVPB 300 milliGRAM(s) IV Intermittent <User Schedule>  vasopressin Infusion 0.03 Unit(s)/Min (4.5 mL/Hr) IV Continuous <Continuous>  voriconazole IVPB 200 milliGRAM(s) IV Intermittent every 12 hours    MEDICATIONS  (PRN):  albuterol/ipratropium for Nebulization 3 milliLiter(s) Nebulizer every 6 hours PRN Shortness of Breath and/or Wheezing  FIRST- Mouthwash  BLM 10 milliLiter(s) Swish and Spit every 8 hours PRN Mouth Care  HYDROmorphone  Injectable 0.25 milliGRAM(s) IV Push every 3 hours PRN Moderate Pain (4 - 6)      PAST MEDICAL & SURGICAL HISTORY:  Diabetes      Transaminitis      Paroxysmal atrial fibrillation      Depression      BPH (benign prostatic hyperplasia)      Hypertension      Chronic atrial fibrillation      Coronary artery disease      Hepatocellular carcinoma      DM (diabetes mellitus)      HTN (hypertension)      Paroxysmal atrial fibrillation      Cirrhosis      HCC (hepatocellular carcinoma)      History of BPH      History of laparoscopic cholecystectomy      History of lumbar laminectomy      H/O prior ablation treatment      H/O percutaneous left heart catheterization          Vital Signs Last 24 Hrs  T(C): 36.3 (15 Giancarlo 2025 19:00), Max: 37.3 (15 Giancarlo 2025 05:00)  T(F): 97.3 (15 Giancarlo 2025 19:00), Max: 99.1 (15 Giancarlo 2025 05:00)  HR: 76 (15 Giancarlo 2025 21:12) (75 - 109)  BP: --  BP(mean): --  RR: 22 (15 Giancarlo 2025 21:00) (17 - 36)  SpO2: 98% (15 Giancarlo 2025 21:12) (91% - 99%)    Parameters below as of 15 Giancarlo 2025 19:00  Patient On (Oxygen Delivery Method): tracheostomy collar    O2 Concentration (%): 40    I&O's Summary    14 Jan 2025 07:01  -  15 Giancarlo 2025 07:00  --------------------------------------------------------  IN: 4023.4 mL / OUT: 2500 mL / NET: 1523.4 mL    15 Giancarlo 2025 07:01  -  15 Giancarlo 2025 22:06  --------------------------------------------------------  IN: 1973.3 mL / OUT: 1445 mL / NET: 528.3 mL                              9.3    0.74  )-----------( 24       ( 15 Giancarlo 2025 18:08 )             26.3     01-15    134[L]  |  100  |  36[H]  ----------------------------<  244[H]  3.7   |  19[L]  |  <0.30[L]    Ca    8.7      15 Giancarlo 2025 18:08  Phos  3.1     01-15  Mg     2.4     01-15    TPro  3.5[L]  /  Alb  3.0[L]  /  TBili  26.1[H]  /  DBili  x   /  AST  57[H]  /  ALT  32  /  AlkPhos  121[H]  01-15          Culture - Blood (collected 01-13-25 @ 17:30)  Source: .Blood BLOOD  Preliminary Report (01-15-25 @ 22:01):    No growth at 48 Hours    Culture - Blood (collected 01-13-25 @ 17:13)  Source: .Blood BLOOD  Preliminary Report (01-15-25 @ 22:01):    No growth at 48 Hours    Culture - Sputum (collected 01-12-25 @ 09:52)  Source: Trach Asp Tracheal Aspirate  Gram Stain (01-12-25 @ 23:20):    No polymorphonuclear leukocytes per low power field    Rare Squamous epithelial cells per low power field    Numerous Gram Negative Rods per oil power field    Few Gram Positive Cocci in Clusters per oil power field    Rare Yeast like cells per oil power field  Final Report (01-15-25 @ 08:23):    Mixed gram negative rods including    Moderate Stenotrophomonas maltophilia    Commensal charity consistent with body site  Organism: Stenotrophomonas maltophilia (01-15-25 @ 08:23)  Organism: Stenotrophomonas maltophilia (01-15-25 @ 08:23)  Organism: Stenotrophomonas maltophilia (01-15-25 @ 08:23)    Culture - Blood (collected 01-11-25 @ 17:50)  Source: .Blood BLOOD  Preliminary Report (01-15-25 @ 03:02):    No growth at 72 Hours    Culture - Blood (collected 01-11-25 @ 16:58)  Source: .Blood BLOOD  Gram Stain (01-13-25 @ 02:56):    Growth in aerobic bottle: Gram Negative Rods  Final Report (01-14-25 @ 16:07):    Growth in aerobic bottle: Stenotrophomonas maltophilia    Direct identification is available within approximately 3-5    hours either by Blood Panel Multiplexed PCR or Direct    MALDI-TOF. Details: [email or web address]  Organism: Blood Culture PCR  Stenotrophomonas maltophilia (01-14-25 @ 16:07)  Organism: Stenotrophomonas maltophilia (01-14-25 @ 16:07)  Organism: Stenotrophomonas maltophilia (01-14-25 @ 16:07)  Organism: Blood Culture PCR (01-14-25 @ 16:07)                ROS: Unable to assess patient is lethargic, s/p tracheostomy    PHYSICAL EXAM:   Constitutional:  awake, semi-responsive  Eyes:  PERRLA, Scleral icterus  ENMT: nc/at, no thrush, NGT  Neck: supple, trach   Respiratory: CTA B/L  Cardiovascular: RRR  Gastrointestinal: incision clean/dry/intact + Ostomy red low/no output, wound vac, peritoneal drains x1 bilious   Genitourinary: +scrotal edema w/ large fluid collection; black/green discoloration  Extremities: SCD's in place and working bilaterally, + UE/LE edema  Neurological: opens eyes to voice   Skin: large sacral decub, poor healing with breakdown

## 2025-01-15 NOTE — PROGRESS NOTE ADULT - SUBJECTIVE AND OBJECTIVE BOX
INTERVAL EVENTS:  - Trach collar day CPAP at night  - Bactrim dose increased to treatment dose  - nocturnal CRRT negative 25cc/hr  - given 1 prbc, 2 plt, 1 cryo  - increased midodrine to 30 TID  - re-started trickle feeds    SUBJECTIVE/ROS:  [ ] A ten-point review of systems was otherwise negative except as noted.  [ ] Due to altered mental status/intubation, subjective information were not able to be obtained from the patient. History was obtained, to the extent possible, from review of the chart and collateral sources of information.      NEURO  Exam: intermittently follows commands  Meds: HYDROmorphone  Injectable 0.25 milliGRAM(s) IV Push every 3 hours PRN Moderate Pain (4 - 6)  melatonin 5 milliGRAM(s) Oral at bedtime  QUEtiapine 25 milliGRAM(s) Oral at bedtime  [x] Adequacy of sedation and pain control has been assessed and adjusted      RESPIRATORY  RR: 24 (01-15-25 @ 01:15) (16 - 30)  SpO2: 96% (01-15-25 @ 01:15) (92% - 100%)  Exam: unlabored, clear to auscultation bilaterally  Mechanical Ventilation: Mode: CPAP with PS, RR (patient): 25, FiO2: 40, PEEP: 5, PS: 5, MAP: 7.3, PIP: 11  ABG - ( 15 Giancarlo 2025 00:01 )  pH: 7.36  /  pCO2: 36    /  pO2: 104   / HCO3: 20    / Base Excess: -4.7  /  SaO2: 100.0     [N/A] Extubation Readiness Assessed  Meds: albuterol/ipratropium for Nebulization 3 milliLiter(s) Nebulizer every 6 hours PRN Shortness of Breath and/or Wheezing  buDESOnide    Inhalation Suspension 0.5 milliGRAM(s) Inhalation every 12 hours      CARDIOVASCULAR  HR: 98 (01-15-25 @ 01:15) (82 - 105)  ABP: 92/45 (01-15-25 @ 01:15) (81/40 - 111/55)  ABP(mean): 64 (01-15-25 @ 01:15) (56 - 80)    Exam: regular rate and rhythm  Cardiac Rhythm: sinus  Perfusion     [x]Adequate   [ ]Inadequate  Mentation   [x]Normal       [ ]Reduced  Extremities  [x]Warm         [ ]Cool  Volume Status [ ]Hypervolemic [x]Euvolemic [ ]Hypovolemic  Meds: metoprolol tartrate Injectable 5 milliGRAM(s) IV Push every 6 hours  midodrine 20 milliGRAM(s) Oral every 8 hours  phenylephrine    Infusion 0.2 MICROgram(s)/kG/Min IV Continuous <Continuous>      GI/NUTRITION  Exam: soft, nontender, nondistended  Diet: tube feeds  Meds: pantoprazole  Injectable 40 milliGRAM(s) IV Push every 12 hours      GENITOURINARY  I&O's Detail    01-13 @ 07:01 - 01-14 @ 07:00  --------------------------------------------------------  IN:    Enteral Tube Flush: 10 mL    IV PiggyBack: 725 mL    IV PiggyBack: 850 mL    Phenylephrine: 112.8 mL    Phenylephrine: 41.1 mL    Phenylephrine: 153.1 mL    Platelets - Single Donor: 225 mL    Vasopressin: 103.5 mL    Vital1.5: 240 mL  Total IN: 2460.5 mL    OUT:    Bulb (mL): 1800 mL    Ileostomy (mL): 30 mL    Indwelling Catheter - Urethral (mL): 0 mL    Nasogastric/Oral tube (mL): 0 mL    Other (mL): 1391 mL  Total OUT: 3221 mL    Total NET: -760.5 mL      01-14 @ 07:01 - 01-15 @ 01:36  --------------------------------------------------------  IN:    Albumin 25%  -  50 mL: 300 mL    Cryoprecipitate: 75 mL    IV PiggyBack: 475 mL    IV PiggyBack: 1150 mL    Phenylephrine: 407.1 mL    Platelets - Single Donor: 225 mL    Vasopressin: 81 mL    Vital1.5: 360 mL  Total IN: 3073.1 mL    OUT:    Bulb (mL): 1075 mL    Ileostomy (mL): 5 mL    Indwelling Catheter - Urethral (mL): 10 mL    Nasogastric/Oral tube (mL): 0 mL    Other (mL): 921 mL  Total OUT: 2011 mL  Total NET: 1062.1 mL        01-14    135  |  101  |  28[H]  ----------------------------<  222[H]  4.1   |  19[L]  |  <0.30[L]    Ca    8.8      14 Jan 2025 18:11  Phos  3.2     01-14  Mg     2.4     01-14    TPro  3.7[L]  /  Alb  3.0[L]  /  TBili  25.8[H]  /  DBili  x   /  AST  53[H]  /  ALT  29  /  AlkPhos  114  01-14    [ ] Castro catheter, indication: N/A  Meds: albumin human 25% IVPB 50 milliLiter(s) IV Intermittent every 8 hours      HEMATOLOGIC  Meds:   [x] VTE Prophylaxis                        7.9    1.05  )-----------( 14       ( 15 Giancarlo 2025 00:13 )             23.3     PT/INR - ( 14 Jan 2025 18:11 )   PT: 26.8 sec;   INR: 2.37 ratio         PTT - ( 14 Jan 2025 18:11 )  PTT:60.3 sec      INFECTIOUS DISEASES  WBC Count: 1.05 K/uL (01-15 @ 00:13)  WBC Count: 1.15 K/uL (01-14 @ 18:11)  WBC Count: 1.92 K/uL (01-14 @ 12:29)  WBC Count: 2.33 K/uL (01-14 @ 06:15)    RECENT CULTURES:  Specimen Source: .Blood BLOOD  Date/Time: 01-13 @ 17:30  Culture Results:   No growth at 24 hours  Gram Stain: --  Organism: --  Specimen Source: .Blood BLOOD  Date/Time: 01-13 @ 17:13  Culture Results:   No growth at 24 hours  Gram Stain: --  Organism: --  Specimen Source: Trach Asp Tracheal Aspirate  Date/Time: 01-12 @ 09:52  Culture Results:   Mixed gram negative rods including  Moderate Stenotrophomonas maltophilia  Commensal charity consistent with body site[!]  Gram Stain:   No polymorphonuclear leukocytes per low power field  Rare Squamous epithelial cells per low power field  Numerous Gram Negative Rods per oil power field  Few Gram Positive Cocci in Clusters per oil power field  Rare Yeast like cells per oil power field[!]  Organism: Stenotrophomonas maltophilia[!]  Specimen Source: .Blood BLOOD  Date/Time: 01-11 @ 17:50  Culture Results:   No growth at 48 Hours  Gram Stain: --  Organism: --  Specimen Source: .Blood BLOOD  Date/Time: 01-11 @ 16:58  Culture Results:   Growth in aerobic bottle: Stenotrophomonas maltophilia  Direct identification is available within approximately 3-5  hours either by Blood Panel Multiplexed PCR or Direct  MALDI-TOF. Details: [email or web address][!]  Gram Stain:   Growth in aerobic bottle: Gram Negative Rods[!]  Organism: Blood Culture PCR  Stenotrophomonas maltophilia[!]    Meds: epoetin tonja (PROCRIT) Injectable 83988 Unit(s) IV Push every 7 days  ganciclovir IVPB 250 milliGRAM(s) IV Intermittent every 12 hours  imipenem/cilastatin  IVPB 500 milliGRAM(s) IV Intermittent every 6 hours  levoFLOXacin IVPB 500 milliGRAM(s) IV Intermittent every 24 hours  trimethoprim / sulfamethoxazole IVPB 300 milliGRAM(s) IV Intermittent every 8 hours  voriconazole IVPB 200 milliGRAM(s) IV Intermittent every 12 hours      ENDOCRINE  CAPILLARY BLOOD GLUCOSE      POCT Blood Glucose.: 247 mg/dL (14 Jan 2025 23:29)  POCT Blood Glucose.: 219 mg/dL (14 Jan 2025 17:51)  POCT Blood Glucose.: 194 mg/dL (14 Jan 2025 12:01)    Meds: insulin lispro (ADMELOG) corrective regimen sliding scale   SubCutaneous every 6 hours  methylPREDNISolone sodium succinate Injectable 16 milliGRAM(s) IV Push daily  vasopressin Infusion 0.03 Unit(s)/Min IV Continuous <Continuous>        CODE STATUS: full code

## 2025-01-15 NOTE — PHARMACOTHERAPY INTERVENTION NOTE - COMMENTS
STERLING BRYANT, 74y Male    Patient is anuric and currently on CRRT. Yesterday, it was switched to 12H CRRT (started last night from 6pm to 6am)    Current antibiotic(s)  Ganciclovir 250mg IV q12h (last dose 1/15 5am)  Imipenem/cilastatin 500mg IV q6h (last dose 1/15 7:40am)  Levofloxacin 500mg IV q24h (last dose 1/14 21:30)  Trim/sulfamethoxazole 300mg IV q8h (last dose 1/15 6:25am)  Voriconazole 200mg IV q12h (last dose 1/15 7:40am)           Recommendation(s):  1) Continue ganciclovir with current dose  2) Imipenem/cilastatin dose adjust to 500mg at 6:00, 18:00, and 24:00; hold 12:00 noon dosing while CRRT is off  3) Continue levofloxacin with current dose  4) Trim/sulfamethoxazole dose adjust to 150mg IV at 10:00, 300mg iV at 18:00 and 2:00  5) Continue voriconazole with current dose    -Discussed with SICU team      Regards,  Frida Covington, PharmD, BCCCP  Clinical Pharmacist, Critical Care  Available via [employer] Teams

## 2025-01-15 NOTE — PROGRESS NOTE ADULT - ASSESSMENT
74 male w/ a PMHx of HTN, DM, AF, BPH, MASH cirrhosis and HCC s/p OLT on 11/15. Case was uneventful but post-op course has been prolonged and c/b delirium, aspiration, multiple episodes of acute hypoxic respiratory failure requiring reintubation from 11/20-11/24 & 11/24-11/26, AF w/ RVR, ileus requiring NGT, distended colon requiring rectal tube, dysphagia, malnutrition, hypernatremia, fevers, pancytopenia, poorly controlled glucoses, and left brachial VTE. Patient went into severe refractory septic shock on 12/6 w/ blood cultures positive for E. coli s/p s/p ex lap, total abd colectomy, end ileostomy, 3 intentionally retained laparotomy pads for bleeding, Abthera, IABP placement w/ admission to CTICU, Patient required inotropes, Jacqui, and CRRT post-op. s/p closure 12/9. IABP removed 12/10 and transferred back to SICU 12/13. SICU course c/b narrow complex arrythmia w/ ECG showing new ST elevations concerning for posterior wall MI vs vasospasms, cards consulted and started on heparin gtt for empiric ACS tx which was c/b GIB then transitioned to argatroban c/b bleed. TTE revealed LVOT obstruction & TODD.     Neuro:  - Pain: PRN dilaudid  - seroquel and melatonin at bedtime  - Opens eyes intermittently, intermittently following commands, off sedation (more awake 1/13)  - CT head 11/19, 11/21, 11/25, 11/29, 12/5, 12/20, 1/11 negative  - EEG: Mild diffuse cerebral dysfunction that is not specific in etiology. No epileptic discharges recorded. No seizures recorded.  - Holding home xanax, Abilify, Zoloft, Remeron, Lexapro  - No need for MRI    Resp:  - S/p bedside tracheostomy 12/17  - trach collar daily, CPAP overnight  - c/w chest PT  - c/w inhaled bronchodilator  - c/w suctioning PRN    CV:  - on kassandra, vaso  - midodrine 30mg q8h  - IV Metoprolol 5mg q6hr  - IABP removed 12/10  - TTE 12/6 w/ LVOT obstruction & TODD  - TTE 12/11 shows EF of 55-60%    GI:  - trend LFTs  - Tube feeds at 20cc/hr  - all anti-motility agents held  - protonix BID  - monitor ALYSSA output  - CT 1/11: no obstruction, enteritis, foci of air concerning for early SBO    /Renal:  - nocturnal CRRT 6p-6a net negative 25cc/hr  - 50cc 25% albumin q8   - Monitor BUN/Cr, I&Os, trend UOP  - Monitor scrotal necrosis, maintain nichols at all times  - Bladder scan q12    Heme:  - trend H/H, PLTs, coags  - hep gtt and argatroban gtt held i/s/o bleed  - PLEX x4, last session 1/9    ID:  - ABX: Voriconazole, imipenem   - bactrim tx dose  - Ganciclovir for CMV   - holding immunosuppresion 2/2 cavitary lesion on CT chest  - 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

## 2025-01-15 NOTE — PROGRESS NOTE ADULT - NS ATTEND AMEND GEN_ALL_CORE FT
s/p OLT 11/15 with post op course c/b:  Hypoxia: Reintubated 11/20, extubated 11/24->reintubated 11/24, extubated 11/26, reintubated 12/7, trached 12/17  Ileus   A fib  AMS/Seizure   L brachial DVT  Neutropenia  E coli Bacteremia (12/6)  s/p Colectomy 12/7.   IABP 12/7, removed 12/10  Ec Faecalis UTI (12/16)  Stenotrophamonas in trach aspirate (12/19)  Clostridium in blood 12/23  UGIB 12/26  CMV Viremia  MORAIMA requiring CRRT  Shock liver    Arousable, off sedatives   Tolerated TC during day and CPAP at night, will try continuous TC, CXR developing large b/l effusion  Titrate Aldo, Vaso, overall dose of vasopressor requirement decreasing  Continue trophic feed at 20 cc, stoma output still minimal 10 cc/24 hrs  Start net  neg fluid balance on CRRT, on nocturnal CRRT  Abx Meropenem, Voriconazole for possible aspergillus, , Bactrim changed to Levaquin for persistent Stenotrophomonas bacteremia.  Intermittent alb  Transed PRBC Pl FFP in last 24 hrs  mech DVT PPx    Prognosis remains very poor  Family kept updated at bed side s/p OLT 11/15 with post op course c/b:  Hypoxia: Reintubated 11/20, extubated 11/24->reintubated 11/24, extubated 11/26, reintubated 12/7, trached 12/17  Ileus   A fib  AMS/Seizure   L brachial DVT  Neutropenia  E coli Bacteremia (12/6)  s/p Colectomy 12/7.   IABP 12/7, removed 12/10  Ec Faecalis UTI (12/16)  Stenotrophomonas in trach aspirate (12/19)  Clostridium in blood 12/23  UGIB 12/26  CMV Viremia  MORAIMA requiring CRRT  Shock liver    Arousable, off sedatives   Tolerated TC during day and CPAP at night, will try continuous TC, CXR developing large b/l effusion  Titrate Aldo, Vaso, overall dose of vasopressor requirement decreasing  Continue trophic feed at 20 cc, stoma output still minimal 10 cc/24 hrs  Start net  neg fluid balance on CRRT, on nocturnal CRRT  Abx Meropenem, Voriconazole for possible aspergillus, , Bactrim changed to Levaquin for persistent Stenotrophomonas bacteremia.  Intermittent alb  Transfused PRBC Pl FFP in last 24 hrs  mech DVT PPx    Prognosis remains very poor  Family kept updated at bed side

## 2025-01-15 NOTE — PROGRESS NOTE ADULT - SUBJECTIVE AND OBJECTIVE BOX
Discharge instructions were given to the patient by Lashae BASSETT.     The patient left the Emergency Department alert and oriented and in no acute distress with 2 prescriptions. The patient was encouraged to call or return to the ED for worsening issues or problems and was encouraged to schedule a follow up appointment for continuing care.     Ambulation assessment completed before discharge.  Pt left Emergency Department ambulating at baseline with no ortho devices  Ortho device education: none    The patient verbalized understanding of discharge instructions and prescriptions, all questions were answered. The patient has no further concerns at this time.      Follow Up:      Interval History:    REVIEW OF SYSTEMS  [  ] ROS unobtainable because:    [  ] All other systems negative except as noted below    Constitutional:  [ ] fever [ ] chills  [ ] weight loss  [ ] weakness  Skin:  [ ] rash [ ] phlebitis	  Eyes: [ ] icterus [ ] pain  [ ] discharge	  ENMT: [ ] sore throat  [ ] thrush [ ] ulcers [ ] exudates  Respiratory: [ ] dyspnea [ ] hemoptysis [ ] cough [ ] sputum	  Cardiovascular:  [ ] chest pain [ ] palpitations [ ] edema	  Gastrointestinal:  [ ] nausea [ ] vomiting [ ] diarrhea [ ] constipation [ ] pain	  Genitourinary:  [ ] dysuria [ ] frequency [ ] hematuria [ ] discharge [ ] flank pain  [ ] incontinence  Musculoskeletal:  [ ] myalgias [ ] arthralgias [ ] arthritis  [ ] back pain  Neurological:  [ ] headache [ ] seizures  [ ] confusion/altered mental status    Allergies  No Known Allergies        ANTIMICROBIALS:  ganciclovir IVPB 250 every 12 hours  imipenem/cilastatin  IVPB 500 <User Schedule>  levoFLOXacin IVPB 500 every 24 hours  trimethoprim / sulfamethoxazole IVPB 150 <User Schedule>  trimethoprim / sulfamethoxazole IVPB 300 <User Schedule>  voriconazole IVPB 200 every 12 hours      OTHER MEDS:  MEDICATIONS  (STANDING):  albuterol/ipratropium for Nebulization 3 every 6 hours PRN  buDESOnide    Inhalation Suspension 0.5 every 12 hours  epoetin tonja (PROCRIT) Injectable 06662 every 7 days  HYDROmorphone  Injectable 0.25 every 3 hours PRN  insulin lispro (ADMELOG) corrective regimen sliding scale  every 6 hours  insulin NPH human recombinant 4 every 6 hours  melatonin 5 at bedtime  methylPREDNISolone sodium succinate Injectable 16 daily  metoprolol tartrate Injectable 5 every 6 hours  midodrine 20 every 8 hours  pantoprazole  Injectable 40 every 12 hours  phenylephrine    Infusion 0.2 <Continuous>  QUEtiapine 25 at bedtime  vasopressin Infusion 0.03 <Continuous>      Vital Signs Last 24 Hrs  T(C): 36.6 (15 Giancarlo 2025 15:00), Max: 37.3 (15 Giancarlo 2025 05:00)  T(F): 97.9 (15 Giancarlo 2025 15:00), Max: 99.1 (15 Giancarlo 2025 05:00)  HR: 86 (15 Giancarlo 2025 15:00) (82 - 109)  BP: --  BP(mean): --  RR: 21 (15 Giancarlo 2025 15:00) (20 - 36)  SpO2: 96% (15 Giancarlo 2025 15:00) (91% - 99%)    Parameters below as of 15 Giancarlo 2025 11:34  Patient On (Oxygen Delivery Method): tracheostomy collar        PHYSICAL EXAMINATION:  General: Alert and Awake, NAD  HEENT: PERRL, EOMI  Neck: Supple  Cardiac: RRR, No M/R/G  Resp: CTAB, No Wh/Rh/Ra  Abdomen: NBS, NT/ND, No HSM, No rigidity or guarding  MSK: No LE edema. No Calf tenderness  : No nichols  Skin: No rashes or lesions. Skin is warm and dry to the touch.   Neuro: Alert and Awake. CN 2-12 Grossly intact. Moves all four extremities spontaneously.  Psych: Calm, Pleasant, Cooperative                          9.2    1.08  )-----------( 11       ( 15 Giancarlo 2025 12:17 )             26.2       01-15    134[L]  |  101  |  30[H]  ----------------------------<  240[H]  3.8   |  20[L]  |  <0.30[L]    Ca    8.5      15 Giancarlo 2025 12:17  Phos  2.5     01-15  Mg     2.4     01-15    TPro  3.6[L]  /  Alb  2.8[L]  /  TBili  26.7[H]  /  DBili  x   /  AST  54[H]  /  ALT  27  /  AlkPhos  120  01-15      Urinalysis Basic - ( 15 Giancarlo 2025 12:17 )    Color: x / Appearance: x / SG: x / pH: x  Gluc: 240 mg/dL / Ketone: x  / Bili: x / Urobili: x   Blood: x / Protein: x / Nitrite: x   Leuk Esterase: x / RBC: x / WBC x   Sq Epi: x / Non Sq Epi: x / Bacteria: x        MICROBIOLOGY:  v  .Blood BLOOD  01-13-25   No growth at 24 hours  --  --      .Blood BLOOD  01-13-25   No growth at 24 hours  --  --      Trach Asp Tracheal Aspirate  01-12-25   Mixed gram negative rods including  Moderate Stenotrophomonas maltophilia  Commensal charity consistent with body site  --  Stenotrophomonas maltophilia      .Blood BLOOD  01-11-25   No growth at 72 Hours  --  --      .Blood BLOOD  01-11-25   Growth in aerobic bottle: Stenotrophomonas maltophilia  Direct identification is available within approximately 3-5  hours either by Blood Panel Multiplexed PCR or Direct  MALDI-TOF. Details: [email or web address]  --  Blood Culture PCR  Stenotrophomonas maltophilia      .Blood BLOOD  01-05-25   No growth at 5 days  --  --      .Blood BLOOD  01-05-25   No growth at 5 days  --  --      .Blood BLOOD  01-02-25   No growth at 5 days  --  --      .Blood BLOOD  01-01-25   No growth at 5 days  --  --      .Blood BLOOD  12-28-24   Growth in anaerobic bottle: Clostridium paraputrificum "Susceptibilities  not performed"  Direct identification is available within approximately 3-5  hours either by Blood Panel Multiplexed PCR or Direct  MALDI-TOF. Details: [email or web address]  --  Blood Culture PCR      .Blood BLOOD  12-28-24   No growth at 5 days  --  --      Body Fluid  12-26-24   Rare Enterococcus faecalis  --  Enterococcus faecalis      Body Fluid  12-26-24   Rare Enterococcus faecalis  --  Enterococcus faecalis      .Blood BLOOD  12-24-24   No growth at 5 days  --  --      Combi-Cath  12-23-24   Commensal charity consistent with body site  --    No polymorphonuclear cells seen per low power field  No squamous epithelial cells per low power field  No organisms seen per oil power field      .Blood BLOOD  12-23-24   Growth in anaerobic bottle: Clostridium paraputrificum "Susceptibilities  not performed"  Direct identification is available within approximately 3-5  hours either by Blood Panel Multiplexed PCR or Direct  MALDI-TOF. Details: [email or web address]  --  Blood Culture PCR      Abdominal Fl  12-20-24   Rare Enterococcus faecalis  --  Enterococcus faecalis      Trach Asp Tracheal Aspirate  12-19-24   Moderate Stenotrophomonas maltophilia  Commensal charity consistent with body site  --  Stenotrophomonas maltophilia      .Blood BLOOD  12-19-24   No growth at 5 days  --  --        HIV-1 RNA Quantitative, Viral Load Log: NOT DET. lg /mL (12-18-24 @ 22:14)  Toxoplasma IgG Screen: 78.00 IU/mL (11-15-24 @ 00:41)  CMV IgG Antibody: 1.50 U/mL (11-15-24 @ 00:41)    CMVPCR Log: 3.11 Xyf54YY/mL (01-12 @ 17:45)        RADIOLOGY:    <The imaging below has been reviewed and visualized by me independently. Findings as detailed in report below> Follow Up:  shock    Interval History: afebrile. undergoing nocturnal CRRT.     REVIEW OF SYSTEMS  [  ] ROS unobtainable because:    [ x ] All other systems negative except as noted below    Constitutional:  [ ] fever [ ] chills  [ ] weight loss  [ ] weakness  Skin:  [ ] rash [ ] phlebitis	  Eyes: [ ] icterus [ ] pain  [ ] discharge	  ENMT: [ ] sore throat  [ ] thrush [ ] ulcers [ ] exudates  Respiratory: [ ] dyspnea [ ] hemoptysis [ ] cough [ ] sputum	  Cardiovascular:  [ ] chest pain [ ] palpitations [ ] edema	  Gastrointestinal:  [ ] nausea [ ] vomiting [ ] diarrhea [ ] constipation [ ] pain	  Genitourinary:  [ ] dysuria [ ] frequency [ ] hematuria [ ] discharge [ ] flank pain  [ ] incontinence  Musculoskeletal:  [ ] myalgias [ ] arthralgias [ ] arthritis  [ ] back pain  Neurological:  [ ] headache [ ] seizures  [ ] confusion/altered mental status    Allergies  No Known Allergies        ANTIMICROBIALS:  ganciclovir IVPB 250 every 12 hours  imipenem/cilastatin  IVPB 500 <User Schedule>  levoFLOXacin IVPB 500 every 24 hours  trimethoprim / sulfamethoxazole IVPB 150 <User Schedule>  trimethoprim / sulfamethoxazole IVPB 300 <User Schedule>  voriconazole IVPB 200 every 12 hours      OTHER MEDS:  MEDICATIONS  (STANDING):  albuterol/ipratropium for Nebulization 3 every 6 hours PRN  buDESOnide    Inhalation Suspension 0.5 every 12 hours  epoetin tonja (PROCRIT) Injectable 65803 every 7 days  HYDROmorphone  Injectable 0.25 every 3 hours PRN  insulin lispro (ADMELOG) corrective regimen sliding scale  every 6 hours  insulin NPH human recombinant 4 every 6 hours  melatonin 5 at bedtime  methylPREDNISolone sodium succinate Injectable 16 daily  metoprolol tartrate Injectable 5 every 6 hours  midodrine 20 every 8 hours  pantoprazole  Injectable 40 every 12 hours  phenylephrine    Infusion 0.2 <Continuous>  QUEtiapine 25 at bedtime  vasopressin Infusion 0.03 <Continuous>      Vital Signs Last 24 Hrs  T(C): 36.6 (15 Giancarlo 2025 15:00), Max: 37.3 (15 Giancarlo 2025 05:00)  T(F): 97.9 (15 Giancarlo 2025 15:00), Max: 99.1 (15 Giancarlo 2025 05:00)  HR: 86 (15 Giancarlo 2025 15:00) (82 - 109)  BP: --  BP(mean): --  RR: 21 (15 Giancarlo 2025 15:00) (20 - 36)  SpO2: 96% (15 Giancarlo 2025 15:00) (91% - 99%)    Parameters below as of 15 Giancarlo 2025 11:34  Patient On (Oxygen Delivery Method): tracheostomy collar        PHYSICAL EXAMINATION:  General: Alert and Awake, NAD  HEENT: PERRL, EOMI  Neck: Supple  Cardiac: RRR, No M/R/G  Resp: CTAB, No Wh/Rh/Ra  Abdomen: NBS, NT/ND, No HSM, No rigidity or guarding  MSK: No LE edema. No Calf tenderness  : No nichols  Skin: No rashes or lesions. Skin is warm and dry to the touch.   Neuro: Alert and Awake. CN 2-12 Grossly intact. Moves all four extremities spontaneously.  Psych: Calm, Pleasant, Cooperative                          9.2    1.08  )-----------( 11       ( 15 Giancarlo 2025 12:17 )             26.2       01-15    134[L]  |  101  |  30[H]  ----------------------------<  240[H]  3.8   |  20[L]  |  <0.30[L]    Ca    8.5      15 Giancarlo 2025 12:17  Phos  2.5     01-15  Mg     2.4     01-15    TPro  3.6[L]  /  Alb  2.8[L]  /  TBili  26.7[H]  /  DBili  x   /  AST  54[H]  /  ALT  27  /  AlkPhos  120  01-15      Urinalysis Basic - ( 15 Giancarlo 2025 12:17 )    Color: x / Appearance: x / SG: x / pH: x  Gluc: 240 mg/dL / Ketone: x  / Bili: x / Urobili: x   Blood: x / Protein: x / Nitrite: x   Leuk Esterase: x / RBC: x / WBC x   Sq Epi: x / Non Sq Epi: x / Bacteria: x        MICROBIOLOGY:  v  .Blood BLOOD  01-13-25   No growth at 24 hours  --  --      .Blood BLOOD  01-13-25   No growth at 24 hours  --  --      Trach Asp Tracheal Aspirate  01-12-25   Mixed gram negative rods including  Moderate Stenotrophomonas maltophilia  Commensal charity consistent with body site  --  Stenotrophomonas maltophilia      .Blood BLOOD  01-11-25   No growth at 72 Hours  --  --      .Blood BLOOD  01-11-25   Growth in aerobic bottle: Stenotrophomonas maltophilia  Direct identification is available within approximately 3-5  hours either by Blood Panel Multiplexed PCR or Direct  MALDI-TOF. Details: [email or web address]  --  Blood Culture PCR  Stenotrophomonas maltophilia      .Blood BLOOD  01-05-25   No growth at 5 days  --  --      .Blood BLOOD  01-05-25   No growth at 5 days  --  --      .Blood BLOOD  01-02-25   No growth at 5 days  --  --      .Blood BLOOD  01-01-25   No growth at 5 days  --  --      .Blood BLOOD  12-28-24   Growth in anaerobic bottle: Clostridium paraputrificum "Susceptibilities  not performed"  Direct identification is available within approximately 3-5  hours either by Blood Panel Multiplexed PCR or Direct  MALDI-TOF. Details: [email or web address]  --  Blood Culture PCR      .Blood BLOOD  12-28-24   No growth at 5 days  --  --      Body Fluid  12-26-24   Rare Enterococcus faecalis  --  Enterococcus faecalis      Body Fluid  12-26-24   Rare Enterococcus faecalis  --  Enterococcus faecalis      .Blood BLOOD  12-24-24   No growth at 5 days  --  --      Combi-Cath  12-23-24   Commensal charity consistent with body site  --    No polymorphonuclear cells seen per low power field  No squamous epithelial cells per low power field  No organisms seen per oil power field      .Blood BLOOD  12-23-24   Growth in anaerobic bottle: Clostridium paraputrificum "Susceptibilities  not performed"  Direct identification is available within approximately 3-5  hours either by Blood Panel Multiplexed PCR or Direct  MALDI-TOF. Details: [email or web address]  --  Blood Culture PCR      Abdominal Fl  12-20-24   Rare Enterococcus faecalis  --  Enterococcus faecalis      Trach Asp Tracheal Aspirate  12-19-24   Moderate Stenotrophomonas maltophilia  Commensal charity consistent with body site  --  Stenotrophomonas maltophilia      .Blood BLOOD  12-19-24   No growth at 5 days  --  --        HIV-1 RNA Quantitative, Viral Load Log: NOT DET. lg /mL (12-18-24 @ 22:14)  Toxoplasma IgG Screen: 78.00 IU/mL (11-15-24 @ 00:41)  CMV IgG Antibody: 1.50 U/mL (11-15-24 @ 00:41)    CMVPCR Log: 3.11 Tgw50BV/mL (01-12 @ 17:45)        RADIOLOGY:    <The imaging below has been reviewed and visualized by me independently. Findings as detailed in report below>    < from: Xray Chest 1 View- PORTABLE-Routine (Xray Chest 1 View- PORTABLE-Routine in AM.) (01.14.25 @ 07:10) >  IMPRESSION:  Resolving pleural effusions.    < end of copied text >

## 2025-01-16 NOTE — PROGRESS NOTE ADULT - ASSESSMENT
74 male w/ a PMHx of HTN, DM, AF, BPH, MASH cirrhosis and HCC s/p OLT on 11/15. Case was uneventful but post-op course has been prolonged and c/b delirium, aspiration, multiple episodes of acute hypoxic respiratory failure requiring reintubation from 11/20-11/24 & 11/24-11/26, AF w/ RVR, ileus requiring NGT, distended colon requiring rectal tube, dysphagia, malnutrition, hypernatremia, fevers, pancytopenia, poorly controlled glucoses, and left brachial VTE. Patient went into severe refractory septic shock on 12/6 w/ blood cultures positive for E. coli s/p s/p ex lap, total abd colectomy, end ileostomy, 3 intentionally retained laparotomy pads for bleeding, Abthera, IABP placement w/ admission to CTICU, Patient required inotropes, Jacqui, and CRRT post-op. s/p closure 12/9. IABP removed 12/10 and transferred back to SICU 12/13. SICU course c/b narrow complex arrythmia w/ ECG showing new ST elevations concerning for posterior wall MI vs vasospasms, cards consulted and started on heparin gtt for empiric ACS tx which was c/b GIB then transitioned to argatroban c/b bleed. TTE revealed LVOT obstruction & TODD.     Wound Consult requested to assist w/ management of  Sacral/ buttocks unstageable pressure injury   Moisture Associated Dermatitis scrotum/ Buttocks/ Thighs  Scrotal Edema  Ascites       Buttocks/ Sacrum/ Scrotum / thighs  TRIAD BID and prn soiling    Scrotal elevation continue  Scrotal wound as per Urology for definitive plan  Bilateral groin - interDry Ag QD  surgical wound management as per surgical team    Continue w/ attends under pads and Pericare w/ Castro & Ostomy care as per protocol  Continue turning and positioning w/ offloading assistive devices as per protocol  consider NOEMI/PVR's if in line with GOC   Waffle Cushion to chair when oob to chair  Continue w/ alternating air with low air loss surface pressure redistribution bed surface   Moisturize intact skin w/ SWEEN cream BID  Appreciate nutrition consult - pt w/ Increased protein-energy needs and Acute Severe protein calorie malnutrition       multivitamin, vitamin C to assist w/ wound healing       Vital 1.5 TF w/CAMI to promote wound healing  Abx as per SiCU / ID  Care as per SICU, will follow w/ you  Upon discharge f/u as outpatient at [facility] [address] [facility] [phone]  Seen w/ RN and  D/w attng, team   Thank you for this consult  Nights/ Weekends/ Holidays please call:  General Surgery Consult pager [phone]) for emergencies  Wound PT for multilayer leg wrapping or VAC issues ([phone])  I spent 50minutes face to face w/ this pt of which more than 50% of the time was spent counseling & coordinating care of this pt.    74 male w/ a PMHx of HTN, DM, AF, BPH, MASH cirrhosis and HCC s/p OLT on 11/15. Case was uneventful but post-op course has been prolonged and c/b delirium, aspiration, multiple episodes of acute hypoxic respiratory failure requiring reintubation from 11/20-11/24 & 11/24-11/26, AF w/ RVR, ileus requiring NGT, distended colon requiring rectal tube, dysphagia, malnutrition, hypernatremia, fevers, pancytopenia, poorly controlled glucoses, and left brachial VTE. Patient went into severe refractory septic shock on 12/6 w/ blood cultures positive for E. coli s/p s/p ex lap, total abd colectomy, end ileostomy, 3 intentionally retained laparotomy pads for bleeding, Abthera, IABP placement w/ admission to CTICU, Patient required inotropes, Jacqui, and CRRT post-op. s/p closure 12/9. IABP removed 12/10 and transferred back to SICU 12/13. SICU course c/b narrow complex arrythmia w/ ECG showing new ST elevations concerning for posterior wall MI vs vasospasms, cards consulted and started on heparin gtt for empiric ACS tx which was c/b GIB then transitioned to argatroban c/b bleed. TTE revealed LVOT obstruction & TODD.     Wound Consult requested to assist w/ management of  Sacral/ buttocks unstageable pressure injury   Moisture Associated Dermatitis scrotum/ Buttocks/ Thighs  Scrotal Edema  Ascites       Buttocks/ Sacrum/ Scrotum / thighs  TRIAD BID and prn soiling    Scrotal elevation continue  Scrotal wound as per Urology for definitive plan  Bilateral groin - interDry Ag QD  surgical wound management as per surgical team    Continue w/ attends under pads and Pericare w/ Castro & Ostomy care as per protocol  Continue turning and positioning w/ offloading assistive devices as per protocol  consider NOEMI/PVR's if in line with GOC   Waffle Cushion to chair when oob to chair  Continue w/ alternating air with low air loss surface pressure redistribution bed surface   Moisturize intact skin w/ SWEEN cream BID  Appreciate nutrition consult - pt w/ Increased protein-energy needs and Acute Severe protein calorie malnutrition       multivitamin, vitamin C to assist w/ wound healing       Vital 1.5 TF w/CAMI to promote wound healing  Abx as per SiCU / ID  Care as per SICU, will follow w/ you  Upon discharge f/u as outpatient at Wound Center [address] [facility] [phone]  Seen w/ RN and  D/w attng, team   Thank you for this consult  Nights/ Weekends/ Holidays please call:  General Surgery Consult pager ([phone]) for emergencies  Wound PT for multilayer leg wrapping or VAC issues (x [phone])

## 2025-01-16 NOTE — CHART NOTE - NSCHARTNOTEFT_GEN_A_CORE
Scrotum was examined at the bedside. No evidence of gangrene, non-indurated, non erythematous. Dark olive appearance suggestive of ischemia. Etiology multifactorial, likely a combination of dependent position and pressor requirements.     - No acute urologic intervention indicated at this time.  - Continue to elevate scrotum with support.    Urology   Pager #[phone]

## 2025-01-16 NOTE — PROGRESS NOTE ADULT - ASSESSMENT
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 Post-op course c/b worsening mental status and acute hypoxic respiratory failure requiring multiple intubations/extubations, currently extubated (as of 11/26/24) and colonic ileus s/p several rounds of Relistor and Neostigmine with NGT and rectal tube currently in place now with septic shock due to ischemic bowel s/p total colectomy with ostomy creation with MORAIMA on CRRT.      1. s/p OLT 11/15/24 with oliguric MORAIMA in setting of septic shock and poor liver function.   - CT AP non-con: Bilateral renal cysts including cysts with peripheral calcification in the left kidney.  - Initiated on CRRT 12/7/24- 12/15/24 at 1AM stopped. s/p IHD 12/18/24 however required levophed.   - Has been back on CRRT but stopped 1/7/25 due to catheter malfunction. Restarted 1/8/25 at 8PM with new catheter.   - Pt oliguric and hypotensive on vasopressors, continue Nocturnal CRRT (transitioned to nocturnal CRRT 1/14/25)  - Monitor labs and I/Os. Avoid nephrotoxins including, ACE/ARB, NSAIDs, contrast, etc. Dose medications as per eGFR.     If you have any questions, please feel free to contact me  Vonnie Schaefer  Nephrology Fellow  [employer])/Page [phone]  (After 5pm or on weekends please page the on-call fellow)

## 2025-01-16 NOTE — PROGRESS NOTE ADULT - NS ATTEND AMEND GEN_ALL_CORE FT
remains extremely ill with multiple infectious issues.  on vasopressor support.  multiple antibiotics/antifungals  Cyclo 25mg BID, Methylpred 16

## 2025-01-16 NOTE — PROGRESS NOTE ADULT - ASSESSMENT
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, cultures have since cleared  - Tx ID following - on Imipenem/Levo/Bactrim DS/Voriconazole  - 1/1 CT CAP: small pleural effusions, enteritis, rest ok  - all lines changed over 1/3  - Wound care for sacral decubitus ulcer  - CT chest/abd/pelvis 1/11 showed apical cavitating lesions likely fungal ID switched Caspo to Voriconazole  - 1/13 Blood Cx + Stenotrophomonas    [] MORAIMA:   - CRRT started 12/7, stopped 12/15, resumed CRRT 12/23, now on nocturnal CRRT negative 100cc/hr  - still anuric     [ ] UGIB s/p EGD (12/26) showing generalized oozing, coagulopathy  - Correct coagulopathy per SICU  - Protonix  - TF at goal    [] s/p OLT  - poor graft function, trial of plasmapheresis 1/3, 1/5, 1/7 for (3-5 days), PLEX #4 1/9,   - IVIG #1 on 1/8, #2 1/9   - PLEX and IVIG held 1/11 +1/12  - repeat liver US unchanged  - Diet: restart trickle feeds  - Pain management: avoid narcotics  - Strict I&Os, wound vac placed 12/10   - US: L brachial DVT (holding A/C)  - wound vac care  - ursodiol 300mgBID     [ ] Immunosuppression  - Tacrolimus stopped 11/18 in setting of AMS/Seizure, Started Cyclo 12/17  - Cyclo per level, MMF HELD, Solumedrol 16 mg daily    [] lleus   -@ home on Linzess  - imaging with distended colon, improving, (likely opioid induced)  - s/p Neostigmine x 2  - s/p Relistor, last dose 12/1  - s/p colectomy with end ileostomy  (see above)  - low ileostomy output post lomotil, restarted trickle feeds  - Colorectal following  - replace losses as able  - Ileostomy output down to 30 mL/day. HOLD antimotility drugs, digitized (fascia wnl)    [] CMV viremia  - CMV PCR (12/11 and 12/16): neg, positive CMV PCR on 12/23  - CMV viral load incr from 537 to 1240 1/6, at 1280 on 1/13, ganciclovir    [ ] AMS  - Reintubated 11/20 Aspiration/hypoxia, extubated 11/24->[id number]--> extubated 11/26 -> [id number] -> tracheostomy 12/17 -> trach collar trials starting 12/29  - EEG 11/30 negative, Neurology following  - BH following   - off tacro  -CT Head No Cont (12/20): Age-indeterminate posterior left frontal lobe infarct.   -CT Head (12/25): Evolving subacute infarct in the left supramarginal gyrus  -repeat CTH ok, poor MS   - melatonin QHS     [ ] HTN/ pAFib  [ ] coronary vasospasm vs posterior wall MI  - remains off all a/c, failed hep gtt/argatroban due to UGIB  - Cards- plan for PCI prior to DC   - Off Amiodarone, off dobutamine  - BB as needed  - Dr. 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, cultures have since cleared  - Tx ID following - on Imipenem/Levo/Bactrim DS/Voriconazole  - 1/1 CT CAP: small pleural effusions, enteritis, rest ok  - all lines changed over 1/3  - Wound care for sacral decubitus ulcer  - CT chest/abd/pelvis 1/11 showed apical cavitating lesions likely fungal ID switched Caspo to Voriconazole  - 1/13 Blood Cx + Stenotrophomonas  - neupogen PRN for low WBC     [] MORAIMA:   - CRRT started 12/7, stopped 12/15, resumed CRRT 12/23, now on nocturnal CRRT negative 100cc/hr  - still anuric     [ ] UGIB s/p EGD (12/26) showing generalized oozing, coagulopathy  - Correct coagulopathy per SICU  - Protonix  - TF at goal    [] s/p OLT  - poor graft function, trial of plasmapheresis 1/3, 1/5, 1/7 for (3-5 days), PLEX #4 1/9,   - IVIG #1 on 1/8, #2 1/9   - PLEX and IVIG held 1/11 +1/12  - repeat liver US unchanged  - Diet: restart trickle feeds  - Pain management: avoid narcotics  - Strict I&Os, wound vac placed 12/10   - US: L brachial DVT (holding A/C)  - wound vac care  - ursodiol 300mgBID     [ ] Immunosuppression  - Tacrolimus stopped 11/18 in setting of AMS/Seizure, Started Cyclo 12/17  - Cyclo per level, MMF HELD, Solumedrol 16 mg daily    [] lleus   -@ home on Linzess  - imaging with distended colon, improving, (likely opioid induced)  - s/p Neostigmine x 2  - s/p Relistor, last dose 12/1  - s/p colectomy with end ileostomy  (see above)  - low ileostomy output post lomotil, restarted trickle feeds  - Colorectal following  - replace losses as able  - Ileostomy output down to 30 mL/day. HOLD antimotility drugs, digitized (fascia wnl)    [] CMV viremia  - CMV PCR (12/11 and 12/16): neg, positive CMV PCR on 12/23  - CMV viral load incr from 537 to 1240 1/6, at 1280 on 1/13, ganciclovir    [ ] AMS  - Reintubated 11/20 Aspiration/hypoxia, extubated 11/24->[id number]/24--> extubated 11/26 -> [id number]/7 -> tracheostomy 12/17 -> trach collar trials starting 12/29  - EEG 11/30 negative, Neurology following  - BH following   - off tacro  -CT Head No Cont (12/20): Age-indeterminate posterior left frontal lobe infarct.   -CT Head (12/25): Evolving subacute infarct in the left supramarginal gyrus  -repeat CTH ok, poor MS   - melatonin QHS     [ ] HTN/ pAFib  [ ] coronary vasospasm vs posterior wall MI  - remains off all a/c, failed hep gtt/argatroban due to UGIB  - Cards- plan for PCI prior to DC   - Off Amiodarone, off dobutamine  - BB as needed  - Dr. Quintanilla following    [ ] DM  - per SICU     [] Scrotal abscess   - US (12/12): 2.1 x0.9 x 3.2 cm focal, right testicle- possible abscess (12/12)  - Urology recs: CT scrotum review no debridement required  - Urology recs Derm consult for guidance on wound care   - 12/23 US doppler scrotum: no arterial flow, limited venous flow, bl hydrocele  - 12/15 CT CAP no acute changes   - Elevate scrotum w/ support Urology signed off 12/29

## 2025-01-16 NOTE — PROGRESS NOTE ADULT - NS ATTEND AMEND GEN_ALL_CORE FT
Pt seen and examined with ACP.  Assessment and plan reviewed and discussed.  Agree with above.    Status of wounds and treatment recommendations d/w  pt's family at bedside..  All questions answered.   Family expressed understanding.    I spent 50  minutes face to face w/ this pt of which more than 50% of the time was spent counseling & coordinating care of this pt.

## 2025-01-16 NOTE — PROGRESS NOTE ADULT - SUBJECTIVE AND OBJECTIVE BOX
Follow Up:      Interval History:    REVIEW OF SYSTEMS  [  ] ROS unobtainable because:    [  ] All other systems negative except as noted below    Constitutional:  [ ] fever [ ] chills  [ ] weight loss  [ ] weakness  Skin:  [ ] rash [ ] phlebitis	  Eyes: [ ] icterus [ ] pain  [ ] discharge	  ENMT: [ ] sore throat  [ ] thrush [ ] ulcers [ ] exudates  Respiratory: [ ] dyspnea [ ] hemoptysis [ ] cough [ ] sputum	  Cardiovascular:  [ ] chest pain [ ] palpitations [ ] edema	  Gastrointestinal:  [ ] nausea [ ] vomiting [ ] diarrhea [ ] constipation [ ] pain	  Genitourinary:  [ ] dysuria [ ] frequency [ ] hematuria [ ] discharge [ ] flank pain  [ ] incontinence  Musculoskeletal:  [ ] myalgias [ ] arthralgias [ ] arthritis  [ ] back pain  Neurological:  [ ] headache [ ] seizures  [ ] confusion/altered mental status    Allergies  No Known Allergies        ANTIMICROBIALS:  ganciclovir IVPB 250 <User Schedule>  ganciclovir IVPB 125 <User Schedule>  levoFLOXacin IVPB 500 every 24 hours  trimethoprim / sulfamethoxazole IVPB 150 <User Schedule>  trimethoprim / sulfamethoxazole IVPB 300 <User Schedule>  voriconazole IVPB 200 every 12 hours      OTHER MEDS:  MEDICATIONS  (STANDING):  albuterol/ipratropium for Nebulization 3 every 6 hours PRN  buDESOnide    Inhalation Suspension 0.5 every 12 hours  cycloSPORINE  , modified (GENGRAF) Solution 25 <User Schedule>  epoetin tonja (PROCRIT) Injectable 80539 every 7 days  filgrastim-sndz (ZARXIO) Injectable 480 once  HYDROmorphone  Injectable 0.25 every 3 hours PRN  insulin lispro (ADMELOG) corrective regimen sliding scale  every 6 hours  insulin NPH human recombinant 6 every 6 hours  melatonin 5 at bedtime  methylPREDNISolone sodium succinate Injectable 16 daily  metoprolol tartrate Injectable 5 every 6 hours  midodrine 20 every 8 hours  pantoprazole  Injectable 40 every 12 hours  phenylephrine    Infusion 0.2 <Continuous>  QUEtiapine 25 at bedtime  vasopressin Infusion 0.03 <Continuous>      Vital Signs Last 24 Hrs  T(C): 36.3 (16 Jan 2025 07:00), Max: 36.6 (15 Giancarlo 2025 15:00)  T(F): 97.3 (16 Jan 2025 07:00), Max: 97.9 (15 Giancarlo 2025 15:00)  HR: 75 (16 Jan 2025 12:45) (70 - 90)  BP: --  BP(mean): --  RR: 14 (16 Jan 2025 12:45) (10 - 33)  SpO2: 94% (16 Jan 2025 12:45) (92% - 100%)    Parameters below as of 16 Jan 2025 07:00  Patient On (Oxygen Delivery Method): tracheostomy collar        PHYSICAL EXAMINATION:  General: Alert and Awake, NAD  HEENT: PERRL, EOMI  Neck: Supple  Cardiac: RRR, No M/R/G  Resp: CTAB, No Wh/Rh/Ra  Abdomen: NBS, NT/ND, No HSM, No rigidity or guarding  MSK: No LE edema. No Calf tenderness  : No nichols  Skin: No rashes or lesions. Skin is warm and dry to the touch.   Neuro: Alert and Awake. CN 2-12 Grossly intact. Moves all four extremities spontaneously.  Psych: Calm, Pleasant, Cooperative                          9.7    1.18  )-----------( 8        ( 16 Jan 2025 06:48 )             27.3       01-16    135  |  102  |  28[H]  ----------------------------<  185[H]  3.9   |  20[L]  |  <0.30[L]    Ca    8.5      16 Jan 2025 06:49  Phos  2.7     01-16  Mg     2.3     01-16    TPro  3.8[L]  /  Alb  3.3  /  TBili  28.8[H]  /  DBili  x   /  AST  60[H]  /  ALT  30  /  AlkPhos  140[H]  01-16      Urinalysis Basic - ( 16 Jan 2025 06:49 )    Color: x / Appearance: x / SG: x / pH: x  Gluc: 185 mg/dL / Ketone: x  / Bili: x / Urobili: x   Blood: x / Protein: x / Nitrite: x   Leuk Esterase: x / RBC: x / WBC x   Sq Epi: x / Non Sq Epi: x / Bacteria: x        MICROBIOLOGY:  v  .Blood BLOOD  01-13-25   No growth at 48 Hours  --  --      .Blood BLOOD  01-13-25   No growth at 48 Hours  --  --      Trach Asp Tracheal Aspirate  01-12-25   Mixed gram negative rods including  Moderate Stenotrophomonas maltophilia  Commensal charity consistent with body site  --  Stenotrophomonas maltophilia      .Blood BLOOD  01-11-25   No growth at 4 days  --  --      .Blood BLOOD  01-11-25   Growth in aerobic bottle: Stenotrophomonas maltophilia  Direct identification is available within approximately 3-5  hours either by Blood Panel Multiplexed PCR or Direct  MALDI-TOF. Details: [email or web address]  --  Blood Culture PCR  Stenotrophomonas maltophilia      .Blood BLOOD  01-05-25   No growth at 5 days  --  --      .Blood BLOOD  01-05-25   No growth at 5 days  --  --      .Blood BLOOD  01-02-25   No growth at 5 days  --  --      .Blood BLOOD  01-01-25   No growth at 5 days  --  --      .Blood BLOOD  12-28-24   Growth in anaerobic bottle: Clostridium paraputrificum "Susceptibilities  not performed"  Direct identification is available within approximately 3-5  hours either by Blood Panel Multiplexed PCR or Direct  MALDI-TOF. Details: [email or web address]  --  Blood Culture PCR      .Blood BLOOD  12-28-24   No growth at 5 days  --  --      Body Fluid  12-26-24   Rare Enterococcus faecalis  --  Enterococcus faecalis      Body Fluid  12-26-24   Rare Enterococcus faecalis  --  Enterococcus faecalis      .Blood BLOOD  12-24-24   No growth at 5 days  --  --      Combi-Cath  12-23-24   Commensal charity consistent with body site  --    No polymorphonuclear cells seen per low power field  No squamous epithelial cells per low power field  No organisms seen per oil power field      .Blood BLOOD  12-23-24   Growth in anaerobic bottle: Clostridium paraputrificum "Susceptibilities  not performed"  Direct identification is available within approximately 3-5  hours either by Blood Panel Multiplexed PCR or Direct  MALDI-TOF. Details: [email or web address]  --  Blood Culture PCR      Abdominal Fl  12-20-24   Rare Enterococcus faecalis  --  Enterococcus faecalis      Trach Asp Tracheal Aspirate  12-19-24   Moderate Stenotrophomonas maltophilia  Commensal charity consistent with body site  --  Stenotrophomonas maltophilia      .Blood BLOOD  12-19-24   No growth at 5 days  --  --        HIV-1 RNA Quantitative, Viral Load Log: NOT DET. lg /mL (12-18-24 @ 22:14)  Toxoplasma IgG Screen: 78.00 IU/mL (11-15-24 @ 00:41)  CMV IgG Antibody: 1.50 U/mL (11-15-24 @ 00:41)    CMVPCR Log: 3.11 Ibm31ZD/mL (01-12 @ 17:45)        RADIOLOGY:    <The imaging below has been reviewed and visualized by me independently. Findings as detailed in report below> Follow Up:  shock    Interval History: continued nocturnal CRRT. afebrile.     REVIEW OF SYSTEMS  [ x ] ROS unobtainable because:  encephalopathic  [  ] All other systems negative except as noted below    Constitutional:  [ ] fever [ ] chills  [ ] weight loss  [ ] weakness  Skin:  [ ] rash [ ] phlebitis	  Eyes: [ ] icterus [ ] pain  [ ] discharge	  ENMT: [ ] sore throat  [ ] thrush [ ] ulcers [ ] exudates  Respiratory: [ ] dyspnea [ ] hemoptysis [ ] cough [ ] sputum	  Cardiovascular:  [ ] chest pain [ ] palpitations [ ] edema	  Gastrointestinal:  [ ] nausea [ ] vomiting [ ] diarrhea [ ] constipation [ ] pain	  Genitourinary:  [ ] dysuria [ ] frequency [ ] hematuria [ ] discharge [ ] flank pain  [ ] incontinence  Musculoskeletal:  [ ] myalgias [ ] arthralgias [ ] arthritis  [ ] back pain  Neurological:  [ ] headache [ ] seizures  [ ] confusion/altered mental status    Allergies  No Known Allergies        ANTIMICROBIALS:  ganciclovir IVPB 250 <User Schedule>  ganciclovir IVPB 125 <User Schedule>  levoFLOXacin IVPB 500 every 24 hours  trimethoprim / sulfamethoxazole IVPB 150 <User Schedule>  trimethoprim / sulfamethoxazole IVPB 300 <User Schedule>  voriconazole IVPB 200 every 12 hours      OTHER MEDS:  MEDICATIONS  (STANDING):  albuterol/ipratropium for Nebulization 3 every 6 hours PRN  buDESOnide    Inhalation Suspension 0.5 every 12 hours  cycloSPORINE  , modified (GENGRAF) Solution 25 <User Schedule>  epoetin tonja (PROCRIT) Injectable 92182 every 7 days  filgrastim-sndz (ZARXIO) Injectable 480 once  HYDROmorphone  Injectable 0.25 every 3 hours PRN  insulin lispro (ADMELOG) corrective regimen sliding scale  every 6 hours  insulin NPH human recombinant 6 every 6 hours  melatonin 5 at bedtime  methylPREDNISolone sodium succinate Injectable 16 daily  metoprolol tartrate Injectable 5 every 6 hours  midodrine 20 every 8 hours  pantoprazole  Injectable 40 every 12 hours  phenylephrine    Infusion 0.2 <Continuous>  QUEtiapine 25 at bedtime  vasopressin Infusion 0.03 <Continuous>      Vital Signs Last 24 Hrs  T(C): 36.3 (16 Jan 2025 07:00), Max: 36.6 (15 Giancarlo 2025 15:00)  T(F): 97.3 (16 Jan 2025 07:00), Max: 97.9 (15 Giancarlo 2025 15:00)  HR: 75 (16 Jan 2025 12:45) (70 - 90)  BP: --  BP(mean): --  RR: 14 (16 Jan 2025 12:45) (10 - 33)  SpO2: 94% (16 Jan 2025 12:45) (92% - 100%)    Parameters below as of 16 Jan 2025 07:00  Patient On (Oxygen Delivery Method): tracheostomy collar    PHYSICAL EXAMINATION:  General: Alert and Awake, NAD  Neck: +Trach  Cardiac: RRR, No M/R/G  Resp: CTAB, No Wh/Rh/Ra  Abdomen: +wound vac over abdomen No HSM, No rigidity or guarding  MSK: No LE edema. No Calf tenderness  : Dressing over testes  Skin: No rashes or lesions. Skin is warm and dry to the touch.   Neuro: Alert and Awake. CN 2-12 Grossly intact. Moves all four extremities spontaneously.  Psych: Calm, Pleasant, Cooperative                        9.7    1.18  )-----------( 8        ( 16 Jan 2025 06:48 )             27.3       01-16    135  |  102  |  28[H]  ----------------------------<  185[H]  3.9   |  20[L]  |  <0.30[L]    Ca    8.5      16 Jan 2025 06:49  Phos  2.7     01-16  Mg     2.3     01-16    TPro  3.8[L]  /  Alb  3.3  /  TBili  28.8[H]  /  DBili  x   /  AST  60[H]  /  ALT  30  /  AlkPhos  140[H]  01-16      Urinalysis Basic - ( 16 Jan 2025 06:49 )    Color: x / Appearance: x / SG: x / pH: x  Gluc: 185 mg/dL / Ketone: x  / Bili: x / Urobili: x   Blood: x / Protein: x / Nitrite: x   Leuk Esterase: x / RBC: x / WBC x   Sq Epi: x / Non Sq Epi: x / Bacteria: x        MICROBIOLOGY:  v  .Blood BLOOD  01-13-25   No growth at 48 Hours  --  --      .Blood BLOOD  01-13-25   No growth at 48 Hours  --  --      Trach Asp Tracheal Aspirate  01-12-25   Mixed gram negative rods including  Moderate Stenotrophomonas maltophilia  Commensal charity consistent with body site  --  Stenotrophomonas maltophilia      .Blood BLOOD  01-11-25   No growth at 4 days  --  --      .Blood BLOOD  01-11-25   Growth in aerobic bottle: Stenotrophomonas maltophilia  Direct identification is available within approximately 3-5  hours either by Blood Panel Multiplexed PCR or Direct  MALDI-TOF. Details: [email or web address]  --  Blood Culture PCR  Stenotrophomonas maltophilia      .Blood BLOOD  01-05-25   No growth at 5 days  --  --      .Blood BLOOD  01-05-25   No growth at 5 days  --  --      .Blood BLOOD  01-02-25   No growth at 5 days  --  --      .Blood BLOOD  01-01-25   No growth at 5 days  --  --      .Blood BLOOD  12-28-24   Growth in anaerobic bottle: Clostridium paraputrificum "Susceptibilities  not performed"  Direct identification is available within approximately 3-5  hours either by Blood Panel Multiplexed PCR or Direct  MALDI-TOF. Details: [email or web address]  --  Blood Culture PCR      .Blood BLOOD  12-28-24   No growth at 5 days  --  --      Body Fluid  12-26-24   Rare Enterococcus faecalis  --  Enterococcus faecalis      Body Fluid  12-26-24   Rare Enterococcus faecalis  --  Enterococcus faecalis      .Blood BLOOD  12-24-24   No growth at 5 days  --  --      Combi-Cath  12-23-24   Commensal charity consistent with body site  --    No polymorphonuclear cells seen per low power field  No squamous epithelial cells per low power field  No organisms seen per oil power field      .Blood BLOOD  12-23-24   Growth in anaerobic bottle: Clostridium paraputrificum "Susceptibilities  not performed"  Direct identification is available within approximately 3-5  hours either by Blood Panel Multiplexed PCR or Direct  MALDI-TOF. Details: [email or web address]  --  Blood Culture PCR      Abdominal Fl  12-20-24   Rare Enterococcus faecalis  --  Enterococcus faecalis      Trach Asp Tracheal Aspirate  12-19-24   Moderate Stenotrophomonas maltophilia  Commensal charity consistent with body site  --  Stenotrophomonas maltophilia      .Blood BLOOD  12-19-24   No growth at 5 days  --  --        HIV-1 RNA Quantitative, Viral Load Log: NOT DET. lg /mL (12-18-24 @ 22:14)  Toxoplasma IgG Screen: 78.00 IU/mL (11-15-24 @ 00:41)  CMV IgG Antibody: 1.50 U/mL (11-15-24 @ 00:41)    CMVPCR Log: 3.11 Qai95AF/mL (01-12 @ 17:45)        RADIOLOGY:    <The imaging below has been reviewed and visualized by me independently. Findings as detailed in report below>    < from: Xray Chest 1 View- PORTABLE-Routine (Xray Chest 1 View- PORTABLE-Routine in AM.) (01.16.25 @ 06:28) >  IMPRESSION:  Decreasing congestion. NG tube as noted.    < end of copied text >

## 2025-01-16 NOTE — PROGRESS NOTE ADULT - NS ATTEND AMEND GEN_ALL_CORE FT
s/p OLT 11/15 with post op course c/b:  Hypoxia: Reintubated 11/20, extubated 11/24->reintubated 11/24, extubated 11/26, reintubated 12/7, trached 12/17  Ileus   A fib  AMS/Seizure   L brachial DVT  Neutropenia  E coli Bacteremia (12/6)  s/p Colectomy 12/7.   IABP 12/7, removed 12/10  Ec Faecalis UTI (12/16)  Stenotrophomonas in trach aspirate (12/19)  Clostridium in blood 12/23  UGIB 12/26  CMV Viremia  MORAIMA requiring CRRT  Shock liver    Arousable, off sedatives   Tolerated TC during day and CPAP at night, will try continuous TC, CXR improvement in  b/l effusion  Titrate Aldo, Vaso, overall dose of vasopressor requirement decreasing  Increase tube feed to 30 cc, increase stoma output to 80 cc/24 hrs  Continue net neg 100 cc fluid balance on CRRT, on nocturnal CRRT  Abx changed to Ganciclovir, Imipenem, Voriconazole for possible aspergillus, , Bactrim  Levaquin for persistent Stenotrophomonas bacteremia.  Intermittent alb  Received Filgrastim for neutropenia    Prognosis remains very poor  Family kept updated at bed side

## 2025-01-16 NOTE — PROGRESS NOTE ADULT - ASSESSMENT
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 Post op course complicated by acute hypoxic respiratory failure requiring intubation multiple times, most recently on 12/7 with conversion to tracheostomy on 12/17, e.coli bacteremia with RTOR on 12/7 for concern for worsening septic shock and cardiogenic shock s/p balloon pump placement and total abdominal colectomy in setting of ischemic bowel s/p OR on 12/9 for ileostomy creation, and olirugirc MORAIMA requiring CRRT and now intermittent HD.    Diagnosed with pneumonia secondary to Stenotrophomonas    Also with growth of Enterococcus faecalis from abdominal drains and urine culture    More fever and shock event on 12/29/24 likely 2/2 GIB    UA (12/19) 2 WBC  BCx (12/23) Clostridium paraputrificum  Bronch Cx (12/23) NGTD    CT Chest (12/23) Bibasilar groundglass and tree in bud opacities which may represent distal airway impaction versus pneumonia.    CT A/P (12/23) Peripheral wedge-shaped areas of hypoattenuation involving the superior aspect of the spleen, suggestive of splenic infarcts (12-59). Mildly dilated loops of proximal small bowel without transition point, likely ileus.    Testicular US (12/23) No appreciable arterial flow with very limited venous flow in in the testes bilaterally. Interval decrease in diffuse scrotal edema. Small bilateral hydroceles.    CT Pelvis (12/25) Moderate diffuse subcutaneous/scrotal edema without defined collection or subcutaneous air.    BCX (12/28): Gram variable rods (Blood PCR neg)    Antibiotic Course:  Meropenem ( 11/22-11/29, 11/22-11/29, 12/16-)   Minocycline (12/21-1/1)  Bactrim (11/16-11/24, 12/8-12/11, 12/21-)  Fluconazole (11/16-12/2, 12/12-12/16)   Caspofungin ( 12/6-12/11, 12/17-)  Cefepime (12/10-12/16)   Metronidazole (12/10-12/14)   Ganciclovir (12/7-12/13)   Linezolid (11/23-11/26, 12/6-12/11, 12/28-12/29)   Atovaquone (11/29-12/6)   Zosyn (11/20-11/22,12/6)   Tobramycin (12/6)   Valganciclovir (11/6-12/4)    #Positive Blood Culture (12/23 Clostridium paraputrificum) (12/28: Gram variable rods -Blood PCR neg)  Clostridium paraputrificum is generally penicillin and metronidazole susceptible      #Leukocytosis, Fever, Shock, Transaminitis,   #Stenotrophomonas tracheo/Pneumonia? s/p minocycline course and now with steontrophomonas bacteremia  # Prior polymicrobial bacteremia E faecalis; Clostridium spp in c/o Gi pathology but also necrotic scrotum     # 1/11-12 ; requiring pressors again, poor ostomy output, new cavitary chest lesion seen on ct  sent blood cultures x2 sets and growing stenotrophomonas in one set  sent sputum from trach with mixed charity but repsiratory tract is steno source and aeration on CXR appears worse  Fungitell 1/13 >500  Aspergillus galactomannan (1/12) 0.02  added voriconazole 200mg iV q 12hr for cavitary pneumonia  send Voriconazole trough  Would repeat CT Chest on/after 1/18 to follow up on cavitary lung lesions  Stenotrophomonas bacteremia-  Bactrim plus Levaquin in CVVH dosing started as he has failed minocycline  -Decrease immunosuppression to as low as feasible  -Given the cavitation and since we cannot exclude additional opportunistic infection favor pursuing bronchoscopy for deeper respiratory cultures if safe / feasible.  -Would stop Imipenem as patient has been on extended course of carbapenem and cavitary lung lesions developed on carbapenem therapy    #Liver Transplant Recipient, Prophylactic Antibiotic  --Restarted IV ganciclovir in induction dosing adjusted for CRRT  Cytomegalovirus By PCR: Det <34.5 IU/mL (12.23.24 @ 06:15)  Cytomegalovirus By PCR: 537 IU/mL (12.31.24 @ 13:36)  Cytomegalovirus By PCR: 1240 IU/mL (01.06.25 @ 00:25)  Cytomegalovirus By PCR: 1280 IU/mL (01.12.25 @ 17:45)    CMV resistance testing sent on 1/14  may need to switch to foscarnet for suspected resistance  received a dose of IVIG for hypogammaglobulinemia    prognosis remains very poor with multiple infections despite minimal immunosuppressive medications     I will continue to follow. Please feel free to contact me with any further questions.    Kyle Junior M.D.  [facility] Division of Infectious Disease  8AM-5PM Monday - Friday: Available on [employer] Teams  After Hours and Holidays (or if no response on [employer] Teams): Please contact the Infectious Diseases Office at [phone]    The above assessment and plan were discussed with SICU Team

## 2025-01-16 NOTE — PROGRESS NOTE ADULT - SUBJECTIVE AND OBJECTIVE BOX
Transplant Surgery - Multidisciplinary Progress Note  --------------------------------------------------------------  OLT   11/15/2024         Colectomy 12/7/2024     IABP 12/7 removed 12/10  Tracheostomy 12/17/2024    Present: Patient seen and examined with multidisciplinary transplant team including Surgeon Dr. Hutchinson, Hepatologist Dr. Hidalgo, ACP Earnestine, Pharmacist: Ralph and bedside RN during AM rounds. Disciplines not in attendance will be notified of plan.    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   s/p OLT 11/15 with post op course c/b:  Hypoxia: Reintubated 11/20, extubated 11/24->reintubated 11/24, extubated 11/26, reintubated 12/7, trached 12/17  Ileus   A fib  AMS/Seizure   L brachial DVT  Neutropenia  E coli Bacteremia (12/6)  s/p Colectomy 12/7.   IABP 12/7, removed 12/10  Ec Faecalis UTI (12/16)  Stenotrophamonas in trach aspirate (12/19)  Clostridium in blood 12/23  UGIB 12/26  CMV Viremia  MORAIMA requiring CRRT  Shock liver    Interval/Overnight Events:                         Immunosuppression:  - Cyclo per level, MMF HELD, solumed 16  -ongoing monitoring for signs of rejection                  ROS: Unable to assess patient is lethargic, s/p tracheostomy    PHYSICAL EXAM:   Constitutional:  awake, semi-responsive  Eyes:  PERRLA, Scleral icterus  ENMT: nc/at, no thrush, NGT  Neck: supple, trach   Respiratory: CTA B/L  Cardiovascular: RRR  Gastrointestinal: incision clean/dry/intact + Ostomy red low/no output, wound vac, peritoneal drains x1 bilious   Genitourinary: +scrotal edema w/ large fluid collection; black/green discoloration  Extremities: SCD's in place and working bilaterally, + UE/LE edema  Neurological: opens eyes to voice   Skin: large sacral decub, poor healing with breakdown Transplant Surgery - Multidisciplinary Progress Note  --------------------------------------------------------------  OLT   11/15/2024         Colectomy 12/7/2024     IABP 12/7 removed 12/10  Tracheostomy 12/17/2024    Present: Patient seen and examined with multidisciplinary transplant team including Surgeon Dr. Hutchinson, Hepatologist Dr. Hidalgo, ACP Earnestine/Jasmine/Ascencion, Pharmacist, and bedside RN during AM rounds. Disciplines not in attendance will be notified of plan.    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   s/p OLT 11/15 with post op course c/b:  Hypoxia: Reintubated 11/20, extubated 11/24->reintubated 11/24, extubated 11/26, reintubated 12/7, trached 12/17  Ileus   A fib  AMS/Seizure   L brachial DVT  Neutropenia  E coli Bacteremia (12/6)  s/p Colectomy 12/7.   IABP 12/7, removed 12/10  Ec Faecalis UTI (12/16)  Stenotrophamonas in trach aspirate (12/19)  Clostridium in blood 12/23  UGIB 12/26  CMV Viremia  MORAIMA requiring CRRT  Shock liver    Interval/Overnight Events:   - cyclo restarted   - trach collar day, CPAP at night  - trickle feeds  - worsening neutropenia, Neupogen 300 x1 with response     Immunosuppression:  - Cyclo per level, MMF HELD, solumed 16  -ongoing monitoring for signs of rejection        MEDICATIONS  (STANDING):  buDESOnide    Inhalation Suspension 0.5 milliGRAM(s) Inhalation every 12 hours  chlorhexidine 0.12% Liquid 15 milliLiter(s) Oral Mucosa every 12 hours  chlorhexidine 2% Cloths 1 Application(s) Topical <User Schedule>  collagenase Ointment 1 Application(s) Topical daily  CRRT Treatment    <Continuous>  cycloSPORINE  , modified (GENGRAF) Solution 25 milliGRAM(s) Oral <User Schedule>  epoetin tonja (PROCRIT) Injectable 95915 Unit(s) IV Push every 7 days  ganciclovir IVPB 250 milliGRAM(s) IV Intermittent <User Schedule>  ganciclovir IVPB 125 milliGRAM(s) IV Intermittent <User Schedule>  insulin lispro (ADMELOG) corrective regimen sliding scale   SubCutaneous every 6 hours  insulin NPH human recombinant 6 Unit(s) SubCutaneous every 6 hours  levoFLOXacin IVPB 500 milliGRAM(s) IV Intermittent every 24 hours  melatonin 5 milliGRAM(s) Oral at bedtime  methylPREDNISolone sodium succinate Injectable 16 milliGRAM(s) IV Push daily  metoprolol tartrate Injectable 5 milliGRAM(s) IV Push every 6 hours  midodrine 20 milliGRAM(s) Oral every 8 hours  mupirocin 2% Ointment 1 Application(s) Topical every 12 hours  nystatin Powder 1 Application(s) Topical every 12 hours  pantoprazole  Injectable 40 milliGRAM(s) IV Push every 12 hours  phenylephrine    Infusion 0.2 MICROgram(s)/kG/Min (7.46 mL/Hr) IV Continuous <Continuous>  Phoxillum Filtration BK 4 / 2.5 5000 milliLiter(s) (1500 mL/Hr) CRRT <Continuous>  Phoxillum Filtration BK 4 / 2.5 5000 milliLiter(s) (1200 mL/Hr) CRRT <Continuous>  PrismaSOL Filtration BGK 4 / 2.5 5000 milliLiter(s) (200 mL/Hr) CRRT <Continuous>  QUEtiapine 25 milliGRAM(s) Oral at bedtime  trimethoprim / sulfamethoxazole IVPB 150 milliGRAM(s) IV Intermittent <User Schedule>  trimethoprim / sulfamethoxazole IVPB 300 milliGRAM(s) IV Intermittent <User Schedule>  vasopressin Infusion 0.03 Unit(s)/Min (4.5 mL/Hr) IV Continuous <Continuous>  voriconazole IVPB 200 milliGRAM(s) IV Intermittent every 12 hours    MEDICATIONS  (PRN):  albuterol/ipratropium for Nebulization 3 milliLiter(s) Nebulizer every 6 hours PRN Shortness of Breath and/or Wheezing  FIRST- Mouthwash  BLM 10 milliLiter(s) Swish and Spit every 8 hours PRN Mouth Care  HYDROmorphone  Injectable 0.25 milliGRAM(s) IV Push every 3 hours PRN Moderate Pain (4 - 6)      PAST MEDICAL & SURGICAL HISTORY:  Diabetes      Transaminitis      Paroxysmal atrial fibrillation      Depression      BPH (benign prostatic hyperplasia)      Hypertension      Chronic atrial fibrillation      Coronary artery disease      Hepatocellular carcinoma      DM (diabetes mellitus)      HTN (hypertension)      Paroxysmal atrial fibrillation      Cirrhosis      HCC (hepatocellular carcinoma)      History of BPH      History of laparoscopic cholecystectomy      History of lumbar laminectomy      H/O prior ablation treatment      H/O percutaneous left heart catheterization          Vital Signs Last 24 Hrs  T(C): 35.8 (16 Jan 2025 15:00), Max: 36.6 (16 Jan 2025 03:00)  T(F): 96.4 (16 Jan 2025 15:00), Max: 97.9 (16 Jan 2025 03:00)  HR: 76 (16 Jan 2025 17:15) (70 - 80)  BP: --  BP(mean): --  RR: 23 (16 Jan 2025 17:15) (10 - 33)  SpO2: 95% (16 Jan 2025 17:15) (92% - 100%)    Parameters below as of 16 Jan 2025 15:00  Patient On (Oxygen Delivery Method): tracheostomy collar    O2 Concentration (%): 40    I&O's Summary    15 Giancarlo 2025 07:01  -  16 Jan 2025 07:00  --------------------------------------------------------  IN: 3545.5 mL / OUT: 4753 mL / NET: -1207.5 mL    16 Jan 2025 07:01  -  16 Jan 2025 17:33  --------------------------------------------------------  IN: 1330.2 mL / OUT: 305 mL / NET: 1025.2 mL                              9.7    1.18  )-----------( 8        ( 16 Jan 2025 06:48 )             27.3     01-16    135  |  102  |  28[H]  ----------------------------<  185[H]  3.9   |  20[L]  |  <0.30[L]    Ca    8.5      16 Jan 2025 06:49  Phos  2.7     01-16  Mg     2.3     01-16    TPro  3.8[L]  /  Alb  3.3  /  TBili  28.8[H]  /  DBili  x   /  AST  60[H]  /  ALT  30  /  AlkPhos  140[H]  01-16          Culture - Blood (collected 01-13-25 @ 17:30)  Source: .Blood BLOOD  Preliminary Report (01-15-25 @ 22:01):    No growth at 48 Hours    Culture - Blood (collected 01-13-25 @ 17:13)  Source: .Blood BLOOD  Preliminary Report (01-15-25 @ 22:01):    No growth at 48 Hours    Culture - Sputum (collected 01-12-25 @ 09:52)  Source: Trach Asp Tracheal Aspirate  Gram Stain (01-12-25 @ 23:20):    No polymorphonuclear leukocytes per low power field    Rare Squamous epithelial cells per low power field    Numerous Gram Negative Rods per oil power field    Few Gram Positive Cocci in Clusters per oil power field    Rare Yeast like cells per oil power field  Final Report (01-15-25 @ 08:23):    Mixed gram negative rods including    Moderate Stenotrophomonas maltophilia    Commensal charity consistent with body site  Organism: Stenotrophomonas maltophilia (01-15-25 @ 08:23)  Organism: Stenotrophomonas maltophilia (01-15-25 @ 08:23)  Organism: Stenotrophomonas maltophilia (01-15-25 @ 08:23)    Culture - Blood (collected 01-11-25 @ 17:50)  Source: .Blood BLOOD  Preliminary Report (01-16-25 @ 03:00):    No growth at 4 days    Culture - Blood (collected 01-11-25 @ 16:58)  Source: .Blood BLOOD  Gram Stain (01-13-25 @ 02:56):    Growth in aerobic bottle: Gram Negative Rods  Final Report (01-14-25 @ 16:07):    Growth in aerobic bottle: Stenotrophomonas maltophilia    Direct identification is available within approximately 3-5    hours either by Blood Panel Multiplexed PCR or Direct    MALDI-TOF. Details: [email or web address]  Organism: Blood Culture PCR  Stenotrophomonas maltophilia (01-14-25 @ 16:07)  Organism: Stenotrophomonas maltophilia (01-14-25 @ 16:07)  Organism: Stenotrophomonas maltophilia (01-14-25 @ 16:07)  Organism: Blood Culture PCR (01-14-25 @ 16:07)        ROS: Unable to assess patient is lethargic, s/p tracheostomy    PHYSICAL EXAM:   Constitutional:  awake, semi-responsive  Eyes:  PERRLA, Scleral icterus  ENMT: nc/at, no thrush, NGT  Neck: supple, trach   Respiratory: CTA B/L  Cardiovascular: RRR  Gastrointestinal: incision clean/dry/intact + Ostomy red low/no output, wound vac, peritoneal drains x1 bilious   Genitourinary: +scrotal edema w/ large fluid collection; black/green discoloration  Extremities: SCD's in place and working bilaterally, + UE/LE edema  Neurological: opens eyes to voice   Skin: large sacral decub, poor healing with breakdown

## 2025-01-16 NOTE — PROGRESS NOTE ADULT - ASSESSMENT
74 male w/ a PMHx of HTN, DM, AF, BPH, MASH cirrhosis and HCC s/p OLT on 11/15. Case was uneventful but post-op course has been prolonged and c/b delirium, aspiration, multiple episodes of acute hypoxic respiratory failure requiring reintubation from 11/20-11/24 & 11/24-11/26, AF w/ RVR, ileus requiring NGT, distended colon requiring rectal tube, dysphagia, malnutrition, hypernatremia, fevers, pancytopenia, poorly controlled glucoses, and left brachial VTE. Patient went into severe refractory septic shock on 12/6 w/ blood cultures positive for E. coli s/p s/p ex lap, total abd colectomy, end ileostomy, 3 intentionally retained laparotomy pads for bleeding, Abthera, IABP placement w/ admission to CTICU, Patient required inotropes, Jacqui, and CRRT post-op. s/p closure 12/9. IABP removed 12/10 and transferred back to SICU 12/13. SICU course c/b narrow complex arrythmia w/ ECG showing new ST elevations concerning for posterior wall MI vs vasospasms, cards consulted and started on heparin gtt for empiric ACS tx which was c/b GIB then transitioned to argatroban c/b bleed. TTE revealed LVOT obstruction & TODD.     Neuro:  - Pain: PRN dilaudid  - seroquel and melatonin at bedtime  - Opens eyes intermittently, intermittently following commands, off sedation (more awake 1/13)  - CT head 11/19, 11/21, 11/25, 11/29, 12/5, 12/20, 1/11 negative  - EEG: Mild diffuse cerebral dysfunction that is not specific in etiology. No epileptic discharges recorded. No seizures recorded.  - Holding home xanax, Abilify, Zoloft, Remeron, Lexapro  - No need for MRI    Resp:  - S/p bedside tracheostomy 12/17  - trach collar daily, CPAP overnight  - c/w chest PT  - c/w inhaled bronchodilator  - c/w suctioning PRN    CV:  - on kassandra, vaso  - midodrine 30mg q8h  - IV Metoprolol 5mg q6hr  - IABP removed 12/10  - TTE 12/6 w/ LVOT obstruction & TODD  - TTE 12/11 shows EF of 55-60%    GI:  - trend LFTs  - Tube feeds at 20cc/hr  - all anti-motility agents held  - protonix BID  - monitor ALYSSA output  - CT 1/11: no obstruction, enteritis, foci of air concerning for early SBO    /Renal:  - nocturnal CRRT 6p-6a net negative 100cc/hr  - 50cc 25% albumin q8   - Monitor BUN/Cr, I&Os, trend UOP  - Monitor scrotal necrosis, maintain nichols at all times  - Bladder scan q12    Heme:  - trend H/H, PLTs, coags; Plt goal 20   - hep gtt and argatroban gtt held i/s/o bleed  - PLEX x4, last session 1/9    ID:  - ABX: Voriconazole, imipenem, levaquin, bactrim   - Ganciclovir for CMV   - holding immunosuppresion 2/2 cavitary lesion on CT chest  - Tracheal aspirate -> Stenotrophomas maltophilia  - UCx 12/16 positive, Combi cath 12/19 positive  - BCx positive for clostridium paraputrificum 12/28  - BCx 01/13 1/2 bottles positive for stenotrophomonoas maltophilia --> on levaquin and bactrim   - Mouthwash for mouth ulcers  - C diff and GI stool PCR negative    Endo:  - monitor glucose   - NPH 4u q6  - methylprednisolone 16 qd    Lines:   - NGT   - ALYSSA  - [facility] trialysis

## 2025-01-16 NOTE — PROGRESS NOTE ADULT - SUBJECTIVE AND OBJECTIVE BOX
[facility]-- WOUND TEAM -- FOLLOW UP NOTE  --------------------------------------------------------------------------------    24 hour events/subjective:    afebrile  trach collar/ remains in ICU  tolerating tf  CVVHD  (+)magdalena  family at bedside, all questions answered to their expressed understanding          Diet:  Diet, NPO with Tube Feed:   Tube Feeding Modality: Nasogastric  Vital 1.5 [place] (VITAL1.5RTH)  Total Volume for 24 Hours (mL): 720  Continuous  Starting Tube Feed Rate mL per Hour: 30  Until Goal Tube Feed Rate (mL per Hour): 30  Tube Feed Duration (in Hours): 24  Tube Feed Start Time: 12:00 (01-16-25 @ 12:01)      ROS: pt unable to offer    ALLERGIES & MEDICATIONS  --------------------------------------------------------------------------------      No Known Allergies    STANDING INPATIENT MEDICATIONS  buDESOnide Inhalation Suspension 0.5 milliGRAM(s) Inhalation every 12 hours  chlorhexidine 0.12% Liquid 15 milliLiter(s) Oral Mucosa every 12 hours  chlorhexidine 2% Cloths 1 Application(s) Topical <User Schedule>  collagenase Ointment 1 Application(s) Topical daily  CRRT Treatment    <Continuous>  cycloSPORINE  , modified (GENGRAF) Solution 25 milliGRAM(s) Oral <User Schedule>  epoetin tonja (PROCRIT) Injectable 66787 Unit(s) IV Push every 7 days  filgrastim-sndz (ZARXIO) Injectable 480 MICROGram(s) SubCutaneous once  ganciclovir IVPB 250 milliGRAM(s) IV Intermittent <User Schedule>  ganciclovir IVPB 125 milliGRAM(s) IV Intermittent <User Schedule>  insulin lispro (ADMELOG) corrective regimen sliding scale   SubCutaneous every 6 hours  insulin NPH human recombinant 6 Unit(s) SubCutaneous every 6 hours  levoFLOXacin IVPB 500 milliGRAM(s) IV Intermittent every 24 hours  melatonin 5 milliGRAM(s) Oral at bedtime  methylPREDNISolone sodium succinate Injectable 16 milliGRAM(s) IV Push daily  metoprolol tartrate Injectable 5 milliGRAM(s) IV Push every 6 hours  midodrine 20 milliGRAM(s) Oral every 8 hours  mupirocin 2% Ointment 1 Application(s) Topical every 12 hours  nystatin Powder 1 Application(s) Topical every 12 hours  pantoprazole  Injectable 40 milliGRAM(s) IV Push every 12 hours  phenylephrine    Infusion 0.2 MICROgram(s)/kG/Min IV Continuous <Continuous>  Phoxillum Filtration BK 4 / 2.5 5000 milliLiter(s) CRRT <Continuous>  Phoxillum Filtration BK 4 / 2.5 5000 milliLiter(s) CRRT <Continuous>  PrismaSOL Filtration BGK 4 / 2.5 5000 milliLiter(s) CRRT <Continuous>  QUEtiapine 25 milliGRAM(s) Oral at bedtime  trimethoprim / sulfamethoxazole IVPB 150 milliGRAM(s) IV Intermittent <User Schedule>  trimethoprim / sulfamethoxazole IVPB 300 milliGRAM(s) IV Intermittent <User Schedule>  vasopressin Infusion 0.03 Unit(s)/Min IV Continuous <Continuous>  voriconazole IVPB 200 milliGRAM(s) IV Intermittent every 12 hours      PRN INPATIENT MEDICATION  albuterol/ipratropium for Nebulization 3 milliLiter(s) Nebulizer every 6 hours PRN  FIRST- Mouthwash  BLM 10 milliLiter(s) Swish and Spit every 8 hours PRN  HYDROmorphone  Injectable 0.25 milliGRAM(s) IV Push every 3 hours PRN        VITALS/PHYSICAL EXAM  --------------------------------------------------------------------------------  T(C): 36.3 (01-16-25 @ 07:00), Max: 36.6 (01-15-25 @ 15:00)  HR: 75 (01-16-25 @ 12:45) (70 - 92)  BP: --  RR: 14 (01-16-25 @ 12:45) (10 - 33)  SpO2: 94% (01-16-25 @ 12:45) (91% - 100%)  Wt(kg): --      Guarded but stable, arousable, WN/ WG/ WD, pressors gtt  Progressa bed  HEENT: NC/AT, jaundice, mucosa moist, throat clear, trach collar  Respiratory: Nonlabored w/ equal chest rise  Gastrointestinal: soft NT/ND (+)stoma moist, viable, functioning      VAC in place w/ good seal care as per surgery       (+)ALYSSA Lt abdomin w/serosanguinous drainage  : (+) nichols, scrotal edema much improved     Scrotum grey dusky skin w/o drainage or blistering   no blistering, odor, tenderness, increased warmth, erythema, crepitus, induration, or fluctuance  Neurology: sedated  Psych: difficult to assess  Musculoskeletal:  pROM, no deformities/ contractures  Vascular: BLE edema equal, no palpable DP pulses  Skin: moist w/ good turgor, anasarca   Bilateral thighs & flank w/ scattered areas of grey slough and pink denuded skin         w/  clear yellow serous weeping    Sacrum/bilateral buttocks unstageable pressure injury    dry adherent black eschar w/ denuded and hyperpigmented skin in periwound    12cm x 10.0cm x 0.1cm,     no drainage or blistering  no blistering, odor, tenderness, increased warmth, erythema, crepitus, induration, or fluctuance        LABS/ CULTURES/ RADIOLOGY:              9.7    1.18  >-----------<  8        [01-16-25 @ 06:48]              27.3     135  |  102  |  28  ----------------------------<  185      [01-16-25 @ 06:49]  3.9   |  20  |  <0.30        Ca     8.5     [01-16-25 @ 06:49]      Mg     2.3     [01-16-25 @ 06:49]      Phos  2.7     [01-16-25 @ 06:49]    TPro  3.8  /  Alb  3.3  /  TBili  28.8  /  DBili  x   /  AST  60  /  ALT  30  /  AlkPhos  140  [01-16-25 @ 06:49]    PT/INR: PT 24.6 , INR 2.17       [01-16-25 @ 06:46]  PTT: 67.2       [01-16-25 @ 06:46]      CAPILLARY BLOOD GLUCOSE  POCT Blood Glucose.: 202 mg/dL (16 Jan 2025 12:29)  POCT Blood Glucose.: 206 mg/dL (16 Jan 2025 05:31)  POCT Blood Glucose.: 219 mg/dL (15 Giancarlo 2025 23:15)  POCT Blood Glucose.: 274 mg/dL (15 Giancarlo 2025 18:00)      Culture - Blood (collected 01-13-25 @ 17:30)  Source: .Blood BLOOD  Preliminary Report (01-15-25 @ 22:01):    No growth at 48 Hours    Culture - Blood (collected 01-13-25 @ 17:13)  Source: .Blood BLOOD  Preliminary Report (01-15-25 @ 22:01):    No growth at 48 Hours    Culture - Blood (collected 01-11-25 @ 17:50)  Source: .Blood BLOOD  Preliminary Report (01-16-25 @ 03:00):    No growth at 4 days

## 2025-01-16 NOTE — PROGRESS NOTE ADULT - SUBJECTIVE AND OBJECTIVE BOX
ON:   - nocturnal CRRT negative 100cc/hr, got a little soft titrated pressors   - heme c/s in AM for monocytic predominance         SUBJECTIVE:    MEDICATIONS  (STANDING):  albumin human 25% IVPB 50 milliLiter(s) IV Intermittent every 8 hours  buDESOnide    Inhalation Suspension 0.5 milliGRAM(s) Inhalation every 12 hours  chlorhexidine 0.12% Liquid 15 milliLiter(s) Oral Mucosa every 12 hours  chlorhexidine 2% Cloths 1 Application(s) Topical <User Schedule>  collagenase Ointment 1 Application(s) Topical daily  CRRT Treatment    <Continuous>  cycloSPORINE  , modified (GENGRAF) Solution 25 milliGRAM(s) Oral <User Schedule>  epoetin tonja (PROCRIT) Injectable 74885 Unit(s) IV Push every 7 days  filgrastim-sndz (ZARXIO) Injectable 300 MICROGram(s) SubCutaneous once  ganciclovir IVPB 250 milliGRAM(s) IV Intermittent <User Schedule>  ganciclovir IVPB 125 milliGRAM(s) IV Intermittent <User Schedule>  imipenem/cilastatin  IVPB 500 milliGRAM(s) IV Intermittent <User Schedule>  insulin lispro (ADMELOG) corrective regimen sliding scale   SubCutaneous every 6 hours  insulin NPH human recombinant 4 Unit(s) SubCutaneous every 6 hours  levoFLOXacin IVPB 500 milliGRAM(s) IV Intermittent every 24 hours  melatonin 5 milliGRAM(s) Oral at bedtime  methylPREDNISolone sodium succinate Injectable 16 milliGRAM(s) IV Push daily  metoprolol tartrate Injectable 5 milliGRAM(s) IV Push every 6 hours  midodrine 20 milliGRAM(s) Oral every 8 hours  mupirocin 2% Ointment 1 Application(s) Topical every 12 hours  nystatin Powder 1 Application(s) Topical every 12 hours  pantoprazole  Injectable 40 milliGRAM(s) IV Push every 12 hours  phenylephrine    Infusion 0.2 MICROgram(s)/kG/Min (7.46 mL/Hr) IV Continuous <Continuous>  Phoxillum Filtration BK 4 / 2.5 5000 milliLiter(s) (1200 mL/Hr) CRRT <Continuous>  PrismaSATE Dialysate BGK 4 / 2.5 5000 milliLiter(s) (1500 mL/Hr) CRRT <Continuous>  PrismaSOL Filtration BGK 4 / 2.5 5000 milliLiter(s) (200 mL/Hr) CRRT <Continuous>  QUEtiapine 25 milliGRAM(s) Oral at bedtime  trimethoprim / sulfamethoxazole IVPB 150 milliGRAM(s) IV Intermittent <User Schedule>  trimethoprim / sulfamethoxazole IVPB 300 milliGRAM(s) IV Intermittent <User Schedule>  vasopressin Infusion 0.03 Unit(s)/Min (4.5 mL/Hr) IV Continuous <Continuous>  voriconazole IVPB 200 milliGRAM(s) IV Intermittent every 12 hours    MEDICATIONS  (PRN):  albuterol/ipratropium for Nebulization 3 milliLiter(s) Nebulizer every 6 hours PRN Shortness of Breath and/or Wheezing  FIRST- Mouthwash  BLM 10 milliLiter(s) Swish and Spit every 8 hours PRN Mouth Care  HYDROmorphone  Injectable 0.25 milliGRAM(s) IV Push every 3 hours PRN Moderate Pain (4 - 6)      Drips:     ICU Vital Signs Last 24 Hrs  T(C): 36.3 (15 Giancarlo 2025 22:00), Max: 37.3 (15 Giancarlo 2025 05:00)  T(F): 97.3 (15 Giancarlo 2025 22:00), Max: 99.1 (15 Giancarlo 2025 05:00)  HR: 75 (16 Jan 2025 00:15) (73 - 109)  BP: --  BP(mean): --  ABP: 98/48 (16 Jan 2025 00:15) (20/20 - 115/46)  ABP(mean): 67 (16 Jan 2025 00:15) (20 - 77)  RR: 17 (16 Jan 2025 00:15) (14 - 36)  SpO2: 98% (16 Jan 2025 00:15) (91% - 99%)    O2 Parameters below as of 15 Giancarlo 2025 22:00  Patient On (Oxygen Delivery Method): tracheostomy collar    O2 Concentration (%): 40        Physical Exam:  General: NAD  HEENT: NC/AT, EOMI, PERRLA, normal hearing, no oral lesions, neck supple w/o LAD  Pulmonary: Nonlabored breathing, no respiratory distress, CTA-B  Cardiovascular: NSR, no murmurs  Abdominal: soft, NT/ND, +BS, no organomegaly  Extremities: WWP, 5/5 strength x 4, no clubbing/cyanosis/edema  Neuro: A/O x3, CNs II-XII grossly intact, normal motor/sensation, no focal deficits  Pulses: palpable distal pulses    Lines/tubes/drains:  Castro:	      Vent settings:  Mode: AC/ CMV (Assist Control/ Continuous Mandatory Ventilation), RR (machine): 14, TV (machine): 470, FiO2: 40, PEEP: 5, ITime: 0.9, MAP: 8.5, PIP: 18    I&O's Summary    14 Jan 2025 07:01  -  15 Giancarlo 2025 07:00  --------------------------------------------------------  IN: 4023.4 mL / OUT: 2500 mL / NET: 1523.4 mL    15 Giancarlo 2025 07:01  -  16 Jan 2025 00:30  --------------------------------------------------------  IN: 2451.8 mL / OUT: 2921 mL / NET: -469.2 mL        LABS:                        9.3    0.74  )-----------( 24       ( 15 Giancarlo 2025 18:08 )             26.3     01-15    134[L]  |  100  |  36[H]  ----------------------------<  244[H]  3.7   |  19[L]  |  <0.30[L]    Ca    8.7      15 Giancarlo 2025 18:08  Phos  3.1     01-15  Mg     2.4     01-15    TPro  3.5[L]  /  Alb  3.0[L]  /  TBili  26.1[H]  /  DBili  x   /  AST  57[H]  /  ALT  32  /  AlkPhos  121[H]  01-15    PT/INR - ( 15 Giancarlo 2025 06:56 )   PT: 25.8 sec;   INR: 2.26 ratio         PTT - ( 15 Giancarlo 2025 06:56 )  PTT:60.1 sec  Urinalysis Basic - ( 15 Giancarlo 2025 18:08 )    Color: x / Appearance: x / SG: x / pH: x  Gluc: 244 mg/dL / Ketone: x  / Bili: x / Urobili: x   Blood: x / Protein: x / Nitrite: x   Leuk Esterase: x / RBC: x / WBC x   Sq Epi: x / Non Sq Epi: x / Bacteria: x      CAPILLARY BLOOD GLUCOSE      POCT Blood Glucose.: 219 mg/dL (15 Giancarlo 2025 23:15)  POCT Blood Glucose.: 274 mg/dL (15 Giancarlo 2025 18:00)  POCT Blood Glucose.: 240 mg/dL (15 Giancarlo 2025 11:58)  POCT Blood Glucose.: 219 mg/dL (15 Giancarlo 2025 06:42)    LIVER FUNCTIONS - ( 15 Giancarlo 2025 18:08 )  Alb: 3.0 g/dL / Pro: 3.5 g/dL / ALK PHOS: 121 U/L / ALT: 32 U/L / AST: 57 U/L / GGT: x             Cultures:    RADIOLOGY & ADDITIONAL STUDIES:

## 2025-01-17 NOTE — PROGRESS NOTE ADULT - NS ATTEND AMEND GEN_ALL_CORE FT
1u pRBC and PLT  ID - bactrim, levoquin, now off imipenem.  ganciclovir/vori.  has CMV  Immuno - d/c cyclo, cellcept, methylpred 16

## 2025-01-17 NOTE — PROGRESS NOTE ADULT - ATTENDING COMMENTS
74-yr-old man s/p liver transplant developed numerous [id number] in the post-op period needing ICU care. The patient continues to be in ICU on s/p tracheostomy. He developed severe thrombocytopenia ans neutropenia, as well as anemia too.  The etiology of his cytopenia is multifactorial. He is on GSCF, continue daily for ANC goal of >1.0K (stop if does not respond in 10 days; TPO-RA may also be started (Nplate starting at 1 mcg/kg, increase by 1 mcg qw to max 10 mcg/kg until platelet is >50K). Supportive PRBC transfusion as needed. Management as per ICU team.

## 2025-01-17 NOTE — PROGRESS NOTE ADULT - ASSESSMENT
74 male w/ a PMHx of HTN, DM, AF, BPH, MASH cirrhosis and HCC s/p OLT on 11/15. Case was uneventful but post-op course has been prolonged and c/b delirium, aspiration, multiple episodes of acute hypoxic respiratory failure requiring reintubation from 11/20-11/24 & 11/24-11/26, AF w/ RVR, ileus requiring NGT, distended colon requiring rectal tube, dysphagia, malnutrition, hypernatremia, fevers, pancytopenia, poorly controlled glucoses, and left brachial VTE. Patient went into severe refractory septic shock on 12/6 w/ blood cultures positive for E. coli s/p s/p ex lap, total abd colectomy, end ileostomy, 3 intentionally retained laparotomy pads for bleeding, Abthera, IABP placement w/ admission to CTICU, Patient required inotropes, Jacqui, and CRRT post-op. s/p closure 12/9. IABP removed 12/10 and transferred back to SICU 12/13. SICU course c/b narrow complex arrythmia w/ ECG showing new ST elevations concerning for posterior wall MI vs vasospasms, cards consulted and started on heparin gtt for empiric ACS tx which was c/b GIB then transitioned to argatroban c/b bleed. TTE revealed LVOT obstruction & TODD.     Neuro:  - Pain: PRN dilaudid  - seroquel and melatonin at bedtime  - Opens eyes intermittently, intermittently following commands, off sedation (more awake 1/13)  - CT head 11/19, 11/21, 11/25, 11/29, 12/5, 12/20, 1/11 negative  - EEG: Mild diffuse cerebral dysfunction that is not specific in etiology. No epileptic discharges recorded. No seizures recorded.  - Holding home xanax, Abilify, Zoloft, Remeron, Lexapro  - No need for MRI    Resp:  - S/p bedside tracheostomy 12/17  - trach collar daily, CPAP overnight  - c/w chest PT  - c/w inhaled bronchodilator  - c/w suctioning PRN    CV:  - on kassandra, vaso  - inc vaso req overnight  - 250 5% albumin for kassandra req  - midodrine 30mg q8h  - IV Metoprolol 5mg q6hr  - IABP removed 12/10  - TTE 12/6 w/ LVOT obstruction & TODD  - TTE 12/11 shows EF of 55-60%    GI:  - trend LFTs  - Tube feeds at 20cc/hr held for kassandra req  - all anti-motility agents held  - protonix BID  - monitor ALYSSA output  - CT 1/11: no obstruction, enteritis, foci of air concerning for early SBO    /Renal:  - nocturnal CRRT 6p-6a net negative 100cc/hr, made net even for kassandra req  - 50cc 25% albumin q6 for 24 hrs  - Monitor BUN/Cr, I&Os, trend UOP  - Monitor scrotal necrosis, maintain nichols at all times  - Bladder scan q12    Heme:  - trend H/H, PLTs, coags; Plt goal 20   - hep gtt and argatroban gtt held i/s/o bleed  - PLEX x4, last session 1/9    ID:  - ABX: Voriconazole, imipenem, levaquin, bactrim   - Ganciclovir for CMV   - holding immunosuppresion 2/2 cavitary lesion on CT chest  - 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

## 2025-01-17 NOTE — PROGRESS NOTE ADULT - SUBJECTIVE AND OBJECTIVE BOX
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 Pt underwent Y90 Sept, 2023 with favorable treatment response.Pt completed OLTx evaluation and listed for OLTx 5/03/2024    (15 Nov 2024 00:03)        REVIEW OF SYSTEMS:    CONSTITUTIONAL: No weakness, fevers or chills  EYES/ENT: No visual changes;  No vertigo or throat pain   NECK: No pain or stiffness  RESPIRATORY: No cough, wheezing, hemoptysis; No shortness of breath  CARDIOVASCULAR: No chest pain or palpitations  GASTROINTESTINAL: No abdominal or epigastric pain. No nausea, vomiting, or hematemesis; No diarrhea or constipation. No melena or hematochezia.  GENITOURINARY: No dysuria, frequency or hematuria  NEUROLOGICAL: No numbness or weakness  SKIN: No itching, burning, rashes, or lesions   All other review of systems is negative unless indicated above.    PAST MEDICAL & SURGICAL HISTORY:  Diabetes      Transaminitis      Paroxysmal atrial fibrillation      Depression      BPH (benign prostatic hyperplasia)      Hypertension      Chronic atrial fibrillation      Coronary artery disease      Hepatocellular carcinoma      DM (diabetes mellitus)      HTN (hypertension)      Paroxysmal atrial fibrillation      Cirrhosis      HCC (hepatocellular carcinoma)      History of BPH      History of laparoscopic cholecystectomy      History of lumbar laminectomy      H/O prior ablation treatment      H/O percutaneous left heart catheterization          FAMILY HISTORY:  Family history of coronary artery disease (Father, Sibling, Sibling)    Family history of diabetes mellitus (Father, Mother)    Family history of coronary artery disease (Sibling)        SOCIAL HISTORY:     Allergies    No Known Allergies    Intolerances        MEDICATIONS  (STANDING):  albumin human  5% IVPB 250 milliLiter(s) IV Intermittent every 6 hours  albumin human 25% IVPB 50 milliLiter(s) IV Intermittent every 6 hours  buDESOnide    Inhalation Suspension 0.5 milliGRAM(s) Inhalation every 12 hours  chlorhexidine 0.12% Liquid 15 milliLiter(s) Oral Mucosa every 12 hours  chlorhexidine 2% Cloths 1 Application(s) Topical <User Schedule>  collagenase Ointment 1 Application(s) Topical daily  CRRT Treatment    <Continuous>  epoetin tonja (PROCRIT) Injectable 03980 Unit(s) IV Push every 7 days  filgrastim-sndz (ZARXIO) Injectable 480 MICROGram(s) SubCutaneous once  ganciclovir IVPB 250 milliGRAM(s) IV Intermittent <User Schedule>  ganciclovir IVPB 125 milliGRAM(s) IV Intermittent <User Schedule>  insulin lispro (ADMELOG) corrective regimen sliding scale   SubCutaneous every 6 hours  insulin NPH human recombinant 6 Unit(s) SubCutaneous every 6 hours  levoFLOXacin IVPB 500 milliGRAM(s) IV Intermittent every 24 hours  melatonin 5 milliGRAM(s) Oral at bedtime  methylPREDNISolone sodium succinate Injectable 16 milliGRAM(s) IV Push daily  metoprolol tartrate Injectable 5 milliGRAM(s) IV Push every 6 hours  midodrine 30 milliGRAM(s) Oral every 8 hours  mupirocin 2% Ointment 1 Application(s) Topical every 12 hours  nystatin Powder 1 Application(s) Topical every 12 hours  pantoprazole  Injectable 40 milliGRAM(s) IV Push every 12 hours  phenylephrine    Infusion 4 MICROgram(s)/kG/Min (74.6 mL/Hr) IV Continuous <Continuous>  Phoxillum Filtration BK 4 / 2.5 5000 milliLiter(s) (1500 mL/Hr) CRRT <Continuous>  Phoxillum Filtration BK 4 / 2.5 5000 milliLiter(s) (1200 mL/Hr) CRRT <Continuous>  PrismaSOL Filtration BGK 4 / 2.5 5000 milliLiter(s) (200 mL/Hr) CRRT <Continuous>  QUEtiapine 25 milliGRAM(s) Oral at bedtime  sodium chloride 3%  Inhalation 4 milliLiter(s) Inhalation every 12 hours  trimethoprim / sulfamethoxazole IVPB 150 milliGRAM(s) IV Intermittent <User Schedule>  trimethoprim / sulfamethoxazole IVPB 300 milliGRAM(s) IV Intermittent <User Schedule>  vasopressin Infusion 0.03 Unit(s)/Min (4.5 mL/Hr) IV Continuous <Continuous>  voriconazole IVPB 200 milliGRAM(s) IV Intermittent every 12 hours    MEDICATIONS  (PRN):  albuterol/ipratropium for Nebulization 3 milliLiter(s) Nebulizer every 6 hours PRN Shortness of Breath and/or Wheezing  FIRST- Mouthwash  BLM 10 milliLiter(s) Swish and Spit every 8 hours PRN Mouth Care  HYDROmorphone  Injectable 0.25 milliGRAM(s) IV Push every 3 hours PRN Moderate Pain (4 - 6)      OBJECTIVE       T(F): 97 (01-17-25 @ 11:00), Max: 98.1 (01-16-25 @ 23:00)  HR: 93 (01-17-25 @ 11:15)  BP: --  RR: 11 (01-17-25 @ 11:15)  SpO2: 93% (01-17-25 @ 11:15)  Wt(kg): --    PHYSICAL EXAM   GENERAL: NAD, well-developed  HEAD:  Atraumatic, Normocephalic  EYES: EOMI, PERRLA, conjunctiva and sclera clear  NECK: Supple, No JVD  CHEST/LUNG: Clear to auscultation bilaterally; No wheeze  HEART: Regular rate and rhythm; No murmurs, rubs, or gallops  ABDOMEN: Soft, Nontender, Nondistended; Bowel sounds present  EXTREMITIES:  2+ Peripheral Pulses, No clubbing, cyanosis, or edema  NEUROLOGY: non-focal  SKIN: No rashes or lesions    LABS:                        9.8    0.73  )-----------( 10       ( 17 Jan 2025 06:12 )             27.9       01-17    135  |  102  |  27[H]  ----------------------------<  154[H]  4.1   |  17[L]  |  <0.30[L]    Ca    8.5      17 Jan 2025 06:12  Phos  3.2     01-17  Mg     2.3     01-17    TPro  3.8[L]  /  Alb  3.3  /  TBili  28.5[H]  /  DBili  x   /  AST  64[H]  /  ALT  31  /  AlkPhos  168[H]  01-17      Magnesium: 2.3 mg/dL (01-17 @ 06:12)  Phosphorus: 3.2 mg/dL (01-17 @ 06:12)  Magnesium: 2.3 mg/dL (01-17 @ 00:12)  Phosphorus: 3.2 mg/dL (01-17 @ 00:12)  Magnesium: 2.4 mg/dL (01-16 @ 18:23)  Phosphorus: 3.2 mg/dL (01-16 @ 18:23)       Hematology Follow-up    INTERVAL HPI/OVERNIGHT EVENTS:  Patient seen and examined at bedside, s/p trach on phenylephrine, vaso and bactrim.     VITAL SIGNS:  T(F): 97 (01-17-25 @ 11:00)  HR: 94 (01-17-25 @ 13:30)  BP: --  RR: 27 (01-17-25 @ 13:30)  SpO2: 91% (01-17-25 @ 13:30)  Wt(kg): --    PHYSICAL EXAMINATION:  General: Alert and Awake  Neck: +Trach  Cardiac: RRR, No M/R/G  Resp: CTAB  Abdomen: +wound vac over abdomen     MEDICATIONS  (STANDING):  albumin human  5% IVPB 250 milliLiter(s) IV Intermittent every 6 hours  albumin human 25% IVPB 50 milliLiter(s) IV Intermittent every 6 hours  buDESOnide    Inhalation Suspension 0.5 milliGRAM(s) Inhalation every 12 hours  chlorhexidine 0.12% Liquid 15 milliLiter(s) Oral Mucosa every 12 hours  chlorhexidine 2% Cloths 1 Application(s) Topical <User Schedule>  collagenase Ointment 1 Application(s) Topical daily  CRRT Treatment    <Continuous>  epoetin tonja (PROCRIT) Injectable 56266 Unit(s) IV Push every 7 days  filgrastim-sndz (ZARXIO) Injectable 480 MICROGram(s) SubCutaneous once  ganciclovir IVPB 250 milliGRAM(s) IV Intermittent <User Schedule>  ganciclovir IVPB 125 milliGRAM(s) IV Intermittent <User Schedule>  insulin lispro (ADMELOG) corrective regimen sliding scale   SubCutaneous every 6 hours  insulin NPH human recombinant 6 Unit(s) SubCutaneous every 6 hours  levoFLOXacin IVPB 500 milliGRAM(s) IV Intermittent every 24 hours  melatonin 5 milliGRAM(s) Oral at bedtime  methylPREDNISolone sodium succinate Injectable 16 milliGRAM(s) IV Push daily  metoprolol tartrate Injectable 5 milliGRAM(s) IV Push every 6 hours  midodrine 30 milliGRAM(s) Oral every 8 hours  mupirocin 2% Ointment 1 Application(s) Topical every 12 hours  nystatin Powder 1 Application(s) Topical every 12 hours  pantoprazole  Injectable 40 milliGRAM(s) IV Push every 12 hours  phenylephrine    Infusion 4 MICROgram(s)/kG/Min (74.6 mL/Hr) IV Continuous <Continuous>  Phoxillum Filtration BK 4 / 2.5 5000 milliLiter(s) (1500 mL/Hr) CRRT <Continuous>  Phoxillum Filtration BK 4 / 2.5 5000 milliLiter(s) (1200 mL/Hr) CRRT <Continuous>  PrismaSOL Filtration BGK 4 / 2.5 5000 milliLiter(s) (200 mL/Hr) CRRT <Continuous>  QUEtiapine 25 milliGRAM(s) Oral at bedtime  sodium chloride 3%  Inhalation 4 milliLiter(s) Inhalation every 12 hours  trimethoprim / sulfamethoxazole IVPB 150 milliGRAM(s) IV Intermittent <User Schedule>  trimethoprim / sulfamethoxazole IVPB 300 milliGRAM(s) IV Intermittent <User Schedule>  vasopressin Infusion 0.03 Unit(s)/Min (4.5 mL/Hr) IV Continuous <Continuous>  voriconazole IVPB 200 milliGRAM(s) IV Intermittent every 12 hours    MEDICATIONS  (PRN):  albuterol/ipratropium for Nebulization 3 milliLiter(s) Nebulizer every 6 hours PRN Shortness of Breath and/or Wheezing  FIRST- Mouthwash  BLM 10 milliLiter(s) Swish and Spit every 8 hours PRN Mouth Care  HYDROmorphone  Injectable 0.25 milliGRAM(s) IV Push every 3 hours PRN Moderate Pain (4 - 6)      No Known Allergies      LABS:                        9.8    0.73  )-----------( 10       ( 17 Jan 2025 06:12 )             27.9     01-17    135  |  102  |  27[H]  ----------------------------<  154[H]  4.1   |  17[L]  |  <0.30[L]    Ca    8.5      17 Jan 2025 06:12  Phos  3.2     01-17  Mg     2.3     01-17    TPro  3.8[L]  /  Alb  3.3  /  TBili  28.5[H]  /  DBili  x   /  AST  64[H]  /  ALT  31  /  AlkPhos  168[H]  01-17    PT/INR - ( 17 Jan 2025 06:12 )   PT: 24.4 sec;   INR: 2.16 ratio         PTT - ( 17 Jan 2025 06:12 )  PTT:67.5 sec   Urinalysis Basic - ( 17 Jan 2025 06:12 )    Color: x / Appearance: x / SG: x / pH: x  Gluc: 154 mg/dL / Ketone: x  / Bili: x / Urobili: x   Blood: x / Protein: x / Nitrite: x   Leuk Esterase: x / RBC: x / WBC x   Sq Epi: x / Non Sq Epi: x / Bacteria: x        RADIOLOGY & ADDITIONAL TESTS:  Studies reviewed.

## 2025-01-17 NOTE — PROGRESS NOTE ADULT - ASSESSMENT
74 male w/ a PMHx of HTN, DM, AF, BPH, MASH cirrhosis and HCC s/p OLT on 11/15. Case was uneventful but post-op course has been prolonged and c/b delirium, aspiration, multiple episodes of acute hypoxic respiratory failure requiring reintubation from 11/20-11/24 & 11/24-11/26, AF w/ RVR, ileus requiring NGT, distended colon requiring rectal tube, dysphagia, malnutrition, hypernatremia, fevers, pancytopenia, poorly controlled glucoses, and left brachial VTE. Patient went into severe refractory septic shock on 12/6 w/ blood cultures positive for E. coli s/p s/p ex lap, total abd colectomy, end ileostomy, 3 intentionally retained laparotomy pads for bleeding, Abthera, IABP placement w/ admission to CTICU, Patient required inotropes, Jacqui, and CRRT post-op. s/p closure 12/9. IABP removed 12/10 and transferred back to SICU 12/13. SICU course c/b narrow complex arrythmia w/ ECG showing new ST elevations concerning for posterior wall MI vs vasospasms, cards consulted and started on heparin gtt for empiric ACS tx which was c/b GIB then transitioned to argatroban c/b bleed. TTE revealed LVOT obstruction & TODD. Hematology consulted for neutropenia.    #Severe neutropenia and thrombocytopenia in setting of prolonged hospitalization, critical illness, multiple infections, multiple medications (pressors/ antibiotics), GONZALES cirrhosis with portal hypertension, post-OLT complication, IABP induced platelet destruction from high shear forces.   - Borderline thrombocytopenia (60–100k) on admission, consistent with liver disease.  - Post-OLT improvement, followed by decline since 12/7/2024, correlating with acute decompensation (bacteremia, shock, IABP).  - Hypoperfusion-induced bone marrow suppression: All cell lines can be affected, with thrombocytopenia being the most pronounced. Recovery of counts typically lags behind hemodynamic improvement.  - Hematology previously following and multiple smears reviewed: No evidence of hemolysis  - Peripheral smear reviewed 1/17/25: RBCs are normocytic and normochromic . + alice cells, + spur cells + acanthocytes. 1-2 schistocytes per HPF. Very scant neutrophils see on peripheral smear. 1-2 plts seen per HPF, no platelet clumps. True thrombocytopenia No evidence of schistocytosis.    Recommendations  - Recommend starting zarxio 480 mcg daily until ANC >1000  - Recommend starting nplate 1mcg/kg given severe thrombocytopenia unresponsive to transfusion if no contraindications.  ((Initial: 1 mcg/kg once weekly (based on actual body weight); adjust dose by 1 mcg/kg/week increments to achieve platelet count =50,000/mm3 and to reduce the risk of bleeding; Maximum dose: 10 mcg/kg/week (median dose needed to achieve response in clinical trials: 2 to 3 mcg/kg).  - Continue with excellent care per SICU/MICU/ID/Nephrology  - Hematology team to follow.      Note not finalized until signed by attending.   Please do not hesitate to page with questions.     Leanne Szymanski MD  Hematology/Oncology Fellow PGY5  Available on [employer] Teams   Pager: [phone]  For weekends and evenings (5 pm - 8 am), please page fellow on call.

## 2025-01-17 NOTE — ADVANCED PRACTICE NURSE CONSULT - RECOMMEDATIONS
Will recommend:    1. Monitor output.  2. Empty pouch when 1/3-1/2 full   3. Change pouching system every 3-4 days & prn leakage  4. Contact ostomy specialists if questions, concerns/issues .  5. Supplies: Rincon 2 1/4" Ceraplus flat skin barrier (#65662), Lianne 2 - 1/4" drainable pouch (#25968); Accessory products:  stoma paste #[id number], stoma powder (#9020) & Cavilon No sting barrier film wipe (#9254)  Peristomal skin slough -  Apply Aquacel rope hydrofiber dressing cut to size followed by Comfeel Plus Transparent dressing to the dry adherent slough from 5-7 o'clock and to the area of black/brown tissue at 1 o'clock  prior to the application of skin barrier.

## 2025-01-17 NOTE — ADVANCED PRACTICE NURSE CONSULT - ASSESSMENT
Chart reviewed and events noted.  Patient remains acute, requiring SICU Care  In to see patient with PT wound (Josafat Angeles) for routine vac dressing and complete ostomy pouching system changes.      Complete pouching system changed:  Stoma 1- 5/8", cut slightly larger. Stoma discolored tan/yellowish on the top and dark brown/black slough/necrosis at the base circumferentially. ~ 150cc of blood in pouch. Peristomal skin noted with adherent yellow tan slough from 5-7 o'clock with a bridge of intact skin and brown/black dry adherent tissue noted at 1'oclock.  Mucocutaneous junction intact.  Adherent brown/brown tissue noted  at the base circumferentially traveling towards the OS.  Transplant team in at bed side.   Creases/skin indents at 3 and 9 o'clock. Dusted the peristomal skin with stoma powder excess removed.  Dab in with [employer] no sting barrier liquid film.  Aquacel rope cut to size applied at the peristomal area followed by Comfeel Plus transparent dressing as a cover dressing.  Re pouched w/ 2-1/4" flat skin barrier cut the opening to patient's measured stoma size (cut a little larger).  Bead of stoma paste applied to skin creases to level the surface and at the back of skin barrier near opening to caulk.  Re-pouched with the  Lianne drainable pouch. Supplies and pattern left at the bedside. Discussed plan of care with SHIVANI Funes  Will continue to follow up.   Chart reviewed and events noted.  Patient remains acute, requiring SICU Care  In to see patient with PT wound (Josafat Angeles) for routine vac dressing and complete ostomy pouching system changes.      Complete pouching system changed:  Stoma 1- 5/8", cut slightly larger. Stoma discolored tan/yellowish on the top and dark brown/black slough/necrosis at the base circumferentially. ~ 150cc of bloody drainage in pouch. Peristomal skin noted with adherent yellow tan slough from 5-7 o'clock with a bridge of intact skin and brown/black dry adherent tissue noted at 1'oclock.  Mucocutaneous junction intact.  Adherent brown/brown tissue noted  at the base circumferentially traveling towards the OS.  Transplant team in at bed side.   Creases/skin indents at 3 and 9 o'clock. Dusted the peristomal skin with stoma powder excess removed.  Dab in with CavEvalve no sting barrier liquid film.  Aquacel rope cut to size applied at the peristomal area followed by Comfeel Plus transparent dressing as a cover dressing.  Re pouched w/ 2-1/4" flat skin barrier cut the opening to patient's measured stoma size (cut a little larger).  Bead of stoma paste applied to skin creases to level the surface and at the back of skin barrier near opening to caulk.  Re-pouched with the  Lianne drainable pouch. Supplies and pattern left at the bedside. Discussed plan of care with SHIVANI Funes  Will continue to follow up.

## 2025-01-17 NOTE — PROGRESS NOTE ADULT - SUBJECTIVE AND OBJECTIVE BOX
24 HOUR EVENTS:  - trach collar all day  - inc kassandra req  - inc TF  - 25% albumin for 24hrs  - net even CRRT overnight for inc kassandra req    NEURO  RASS (if intubated): 		CAM ICU (if concern for delirium):  Exam: lethargic, weakly following  Meds: HYDROmorphone  Injectable 0.25 milliGRAM(s) IV Push every 3 hours PRN Moderate Pain (4 - 6)  melatonin 5 milliGRAM(s) Oral at bedtime  QUEtiapine 25 milliGRAM(s) Oral at bedtime      RESPIRATORY  RR: 24 (01-17-25 @ 00:00) (10 - 28)  SpO2: 96% (01-17-25 @ 00:00) (92% - 100%)  Wt(kg): --  Exam: Lungs CTA b/l  Mechanical Ventilation: Mode: AC/ CMV (Assist Control/ Continuous Mandatory Ventilation), RR (machine): 14, RR (patient): 20, TV (machine): 470, FiO2: 40, PEEP: 5, ITime: 0.9, MAP: 11, PIP: 18  ABG - ( 15 Giancarlo 2025 12:03 )  pH: 7.35  /  pCO2: 38    /  pO2: 88    / HCO3: 21    / Base Excess: -4.2  /  SaO2: 99.2    Lactate: x                Meds: albuterol/ipratropium for Nebulization 3 milliLiter(s) Nebulizer every 6 hours PRN Shortness of Breath and/or Wheezing  buDESOnide    Inhalation Suspension 0.5 milliGRAM(s) Inhalation every 12 hours  sodium chloride 3%  Inhalation 4 milliLiter(s) Inhalation every 12 hours      CARDIOVASCULAR  HR: 92 (01-17-25 @ 00:00) (70 - 92)  BP: --  BP(mean): --  ABP: 89/41 (01-17-25 @ 00:00) (85/39 - 119/48)  ABP(mean): 59 (01-17-25 @ 00:00) (56 - 77)  Wt(kg): --  CVP(cm H2O): --  VBG - ( 16 Jan 2025 06:36 )  pH: 7.35  /  pCO2: 39    /  pO2: 157   / HCO3: 22    / Base Excess: -3.8  /  SaO2: 99.7   Lactate: 2.0                Exam: Normal S1/S2 w/o murmurs or rubs  Cardiac Rhythm: sinus  Perfusion     [ x]Adequate   [ ]Inadequate  Mentation   [ ]Normal       [x ]Reduced  Extremities  [x ]Warm         [ ]Cool  Volume Status [ ]Hypervolemic [x ]Euvolemic [ ]Hypovolemic  Meds: metoprolol tartrate Injectable 5 milliGRAM(s) IV Push every 6 hours  midodrine 30 milliGRAM(s) Oral every 8 hours  phenylephrine    Infusion 0.2 MICROgram(s)/kG/Min IV Continuous <Continuous>      GI/NUTRITION  Exam: abd distended, non TTP  Diet: tube feeds  Last Bowel Movement: 16-Jan-2025 (01-16-25 @ 07:00)  Last Bowel Movement: 14-Jan-2025 (01-14-25 @ 07:00)  Last Bowel Movement: 13-Jan-2025 (01-13-25 @ 19:00)  Last Bowel Movement: 13-Jan-2025 (01-13-25 @ 07:00)  Last Bowel Movement: 12-Jan-2025 (01-12-25 @ 19:00)  Last Bowel Movement: 10-Giancarlo-2025 (01-12-25 @ 07:00)  Last Bowel Movement: 10-Giancarlo-2025 (01-11-25 @ 19:00)  Last Bowel Movement: 11-Jan-2025 (01-11-25 @ 07:00)    Meds: pantoprazole  Injectable 40 milliGRAM(s) IV Push every 12 hours      GENITOURINARY  I&O's Detail    01-15 @ 07:01  -  01-16 @ 07:00  --------------------------------------------------------  IN:    Albumin 25%  -  50 mL: 150 mL    IV PiggyBack: 600 mL    IV PiggyBack: 1375 mL    Phenylephrine: 607.5 mL    Platelets - Single Donor: 225 mL    Vasopressin: 108 mL    Vital1.5: 480 mL  Total IN: 3545.5 mL    OUT:    Bulb (mL): 1890 mL    Ileostomy (mL): 80 mL    Indwelling Catheter - Urethral (mL): 20 mL    Nasogastric/Oral tube (mL): 0 mL    Other (mL): 2763 mL  Total OUT: 4753 mL    Total NET: -1207.5 mL      01-16 @ 07:01  -  01-17 @ 02:30  --------------------------------------------------------  IN:    IV PiggyBack: 1350 mL    Phenylephrine: 757.5 mL    Platelets - Single Donor: 225 mL    Vasopressin: 81 mL    Vital1.5: 510 mL  Total IN: 2923.5 mL    OUT:    Bulb (mL): 800 mL    Ileostomy (mL): 75 mL    Indwelling Catheter - Urethral (mL): 55 mL    Other (mL): 623 mL  Total OUT: 1553 mL    Total NET: 1370.5 mL          01-17    134[L]  |  100  |  32[H]  ----------------------------<  199[H]  4.0   |  17[L]  |  <0.30[L]    Ca    8.6      17 Jan 2025 00:12  Phos  3.2     01-17  Mg     2.3     01-17    TPro  3.7[L]  /  Alb  2.8[L]  /  TBili  27.8[H]  /  DBili  x   /  AST  59[H]  /  ALT  33  /  AlkPhos  163[H]  01-17    Meds: albumin human 25% IVPB 50 milliLiter(s) IV Intermittent every 6 hours      HEMATOLOGIC  Meds:                         9.7    0.97  )-----------( 17       ( 16 Jan 2025 18:23 )             28.0     PT/INR - ( 16 Jan 2025 06:46 )   PT: 24.6 sec;   INR: 2.17 ratio         PTT - ( 16 Jan 2025 06:46 )  PTT:67.2 sec    INFECTIOUS DISEASES  T(C): 36.7 (01-16-25 @ 23:00), Max: 36.7 (01-16-25 @ 23:00)  Wt(kg): --  WBC Count: 0.97 K/uL (01-16 @ 18:23)  WBC Count: 1.18 K/uL (01-16 @ 06:48)    Recent Cultures:  Specimen Source: .Blood BLOOD, 01-13 @ 17:30; Results   No growth at 72 Hours; Gram Stain: --; Organism: --  Specimen Source: .Blood BLOOD, 01-13 @ 17:13; Results   No growth at 72 Hours; Gram Stain: --; Organism: --  Specimen Source: Trach Asp Tracheal Aspirate, 01-12 @ 09:52; Results   Mixed gram negative rods including  Moderate Stenotrophomonas maltophilia  Commensal charity consistent with body site[!]; Gram Stain:   No polymorphonuclear leukocytes per low power field  Rare Squamous epithelial cells per low power field  Numerous Gram Negative Rods per oil power field  Few Gram Positive Cocci in Clusters per oil power field  Rare Yeast like cells per oil power field[!]; Organism: Stenotrophomonas maltophilia[!]  Specimen Source: .Blood BLOOD, 01-11 @ 17:50; Results   No growth at 4 days; Gram Stain: --; Organism: --  Specimen Source: .Blood BLOOD, 01-11 @ 16:58; Results   Growth in aerobic bottle: Stenotrophomonas maltophilia  Direct identification is available within approximately 3-5  hours either by Blood Panel Multiplexed PCR or Direct  MALDI-TOF. Details: [email or web address][!]; Gram Stain:   Growth in aerobic bottle: Gram Negative Rods[!]; Organism: Blood Culture PCR  Stenotrophomonas maltophilia[!]    Meds: cycloSPORINE  , modified (GENGRAF) Solution 25 milliGRAM(s) Oral <User Schedule>  epoetin tonja (PROCRIT) Injectable 03314 Unit(s) IV Push every 7 days  ganciclovir IVPB 250 milliGRAM(s) IV Intermittent <User Schedule>  ganciclovir IVPB 125 milliGRAM(s) IV Intermittent <User Schedule>  levoFLOXacin IVPB 500 milliGRAM(s) IV Intermittent every 24 hours  trimethoprim / sulfamethoxazole IVPB 150 milliGRAM(s) IV Intermittent <User Schedule>  trimethoprim / sulfamethoxazole IVPB 300 milliGRAM(s) IV Intermittent <User Schedule>  voriconazole IVPB 200 milliGRAM(s) IV Intermittent every 12 hours      ENDOCRINE  Capillary Blood Glucose    Meds: insulin lispro (ADMELOG) corrective regimen sliding scale   SubCutaneous every 6 hours  insulin NPH human recombinant 6 Unit(s) SubCutaneous every 6 hours  methylPREDNISolone sodium succinate Injectable 16 milliGRAM(s) IV Push daily  vasopressin Infusion 0.03 Unit(s)/Min IV Continuous <Continuous>      ACCESS DEVICES:  [ ] Peripheral IV  [ ] Central Venous Line		[ ] R	[ ] L	[ ] IJ	[ ] Fem	[ ] SC	Placed:   [ ] Arterial Line			[ ] R	[ ] L	[ ] Fem	[ ] Rad	[ ] Ax	Placed:   [ ] PICC:					[ ] Mediport  [ ] Urinary Catheter, Date Placed:   [ ] Necessity of urinary, arterial, and venous catheters discussed    OTHER MEDICATIONS:  chlorhexidine 0.12% Liquid 15 milliLiter(s) Oral Mucosa every 12 hours  chlorhexidine 2% Cloths 1 Application(s) Topical <User Schedule>  collagenase Ointment 1 Application(s) Topical daily  CRRT Treatment    <Continuous>  FIRST- Mouthwash  BLM 10 milliLiter(s) Swish and Spit every 8 hours PRN  mupirocin 2% Ointment 1 Application(s) Topical every 12 hours  nystatin Powder 1 Application(s) Topical every 12 hours  Phoxillum Filtration BK 4 / 2.5 5000 milliLiter(s) CRRT <Continuous>  Phoxillum Filtration BK 4 / 2.5 5000 milliLiter(s) CRRT <Continuous>  PrismaSOL Filtration BGK 4 / 2.5 5000 milliLiter(s) CRRT <Continuous>      IMAGING:

## 2025-01-17 NOTE — PROGRESS NOTE ADULT - ASSESSMENT
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 Post op course complicated by acute hypoxic respiratory failure requiring intubation multiple times, most recently on 12/7 with conversion to tracheostomy on 12/17, e.coli bacteremia with RTOR on 12/7 for concern for worsening septic shock and cardiogenic shock s/p balloon pump placement and total abdominal colectomy in setting of ischemic bowel s/p OR on 12/9 for ileostomy creation, and olirugirc MORAIMA requiring CRRT and now intermittent HD.    Diagnosed with pneumonia secondary to Stenotrophomonas    Also with growth of Enterococcus faecalis from abdominal drains and urine culture    More fever and shock event on 12/29/24 likely 2/2 GIB    UA (12/19) 2 WBC  BCx (12/23) Clostridium paraputrificum  Bronch Cx (12/23) NGTD    CT Chest (12/23) Bibasilar groundglass and tree in bud opacities which may represent distal airway impaction versus pneumonia.    CT A/P (12/23) Peripheral wedge-shaped areas of hypoattenuation involving the superior aspect of the spleen, suggestive of splenic infarcts (12-59). Mildly dilated loops of proximal small bowel without transition point, likely ileus.    Testicular US (12/23) No appreciable arterial flow with very limited venous flow in in the testes bilaterally. Interval decrease in diffuse scrotal edema. Small bilateral hydroceles.    CT Pelvis (12/25) Moderate diffuse subcutaneous/scrotal edema without defined collection or subcutaneous air.    BCX (12/28): Gram variable rods (Blood PCR neg)    Antibiotic Course:  Meropenem ( 11/22-11/29, 11/22-11/29, 12/16-)   Minocycline (12/21-1/1)  Bactrim (11/16-11/24, 12/8-12/11, 12/21-)  Fluconazole (11/16-12/2, 12/12-12/16)   Caspofungin ( 12/6-12/11, 12/17-)  Cefepime (12/10-12/16)   Metronidazole (12/10-12/14)   Ganciclovir (12/7-12/13)   Linezolid (11/23-11/26, 12/6-12/11, 12/28-12/29)   Atovaquone (11/29-12/6)   Zosyn (11/20-11/22,12/6)   Tobramycin (12/6)   Valganciclovir (11/6-12/4)    #Positive Blood Culture (12/23 Clostridium paraputrificum) (12/28: Gram variable rods -Blood PCR neg)  Clostridium paraputrificum is generally penicillin and metronidazole susceptible      #Leukocytosis, Fever, Shock, Transaminitis,   #Stenotrophomonas tracheo/Pneumonia? s/p minocycline course and now with steontrophomonas bacteremia  # Prior polymicrobial bacteremia E faecalis; Clostridium spp in c/o Gi pathology but also necrotic scrotum     # 1/11-12 ; requiring pressors again, poor ostomy output, new cavitary chest lesion seen on ct  sent blood cultures x2 sets and growing stenotrophomonas in one set  sent sputum from trach with mixed charity but repsiratory tract is steno source and aeration on CXR appears worse  Fungitell 1/13 >500  Aspergillus galactomannan (1/12) 0.02  added voriconazole 200mg iV q 12hr for cavitary pneumonia  send Voriconazole trough (ordered for tomorrow)  Would repeat CT Chest to follow up on cavitary lung lesions  Stenotrophomonas bacteremia-  Bactrim plus Levaquin in CVVH dosing started as he has failed minocycline  -Decrease immunosuppression to as low as feasible  -Given the cavitation and since we cannot exclude additional opportunistic infection favor pursuing bronchoscopy for deeper respiratory cultures if safe / feasible.    #Liver Transplant Recipient, Prophylactic Antibiotic  --Restarted IV ganciclovir in induction dosing adjusted for CRRT  Cytomegalovirus By PCR: Det <34.5 IU/mL (12.23.24 @ 06:15)  Cytomegalovirus By PCR: 537 IU/mL (12.31.24 @ 13:36)  Cytomegalovirus By PCR: 1240 IU/mL (01.06.25 @ 00:25)  Cytomegalovirus By PCR: 1280 IU/mL (01.12.25 @ 17:45)    CMV resistance testing sent on 1/14  may need to switch to foscarnet for suspected resistance  received a dose of IVIG for hypogammaglobulinemia    prognosis remains very poor with multiple infections despite minimal immunosuppressive medications     I will continue to follow. Please feel free to contact me with any further questions.    Kyle Junior M.D.  [facility] Division of Infectious Disease  8AM-5PM Monday - Friday: Available on [employer] Teams  After Hours and Holidays (or if no response on Microsoft Teams): Please contact the Infectious Diseases Office at [phone]    The above assessment and plan were discussed with BON Schmidt

## 2025-01-17 NOTE — PROGRESS NOTE ADULT - ASSESSMENT
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, cultures have since cleared  - Tx ID following - on Imipenem/Levo/Bactrim DS/Voriconazole  - 1/1 CT CAP: small pleural effusions, enteritis, rest ok  - all lines changed over 1/3  - Wound care for sacral decubitus ulcer  - CT chest/abd/pelvis 1/11 showed apical cavitating lesions likely fungal ID switched Caspo to Voriconazole  - 1/13 Blood Cx + Stenotrophomonas  - neupogen PRN for low WBC     [] MORAIMA:   - CRRT started 12/7, stopped 12/15, resumed CRRT 12/23, now on nocturnal CRRT negative 100cc/hr  - still anuric     [ ] UGIB s/p EGD (12/26) showing generalized oozing, coagulopathy  - Correct coagulopathy per SICU  - Protonix  - TF at goal    [] s/p OLT  - poor graft function, trial of plasmapheresis 1/3, 1/5, 1/7 for (3-5 days), PLEX #4 1/9,   - IVIG #1 on 1/8, #2 1/9   - PLEX and IVIG held 1/11 +1/12  - repeat liver US unchanged  - Diet: restart trickle feeds  - Pain management: avoid narcotics  - Strict I&Os, wound vac placed 12/10   - US: L brachial DVT (holding A/C)  - wound vac care  - ursodiol 300mgBID     [ ] Immunosuppression  - Tacrolimus stopped 11/18 in setting of AMS/Seizure, Started Cyclo 12/17  - Cyclo per level, MMF HELD, Solumedrol 16 mg daily    [] lleus   -@ home on Linzess  - imaging with distended colon, improving, (likely opioid induced)  - s/p Neostigmine x 2  - s/p Relistor, last dose 12/1  - s/p colectomy with end ileostomy  (see above)  - low ileostomy output post lomotil, restarted trickle feeds  - Colorectal following  - replace losses as able  - Ileostomy output increasing (now 75cc/24 from 30), melanotic stool    [] CMV viremia  - CMV PCR (12/11 and 12/16): neg, positive CMV PCR on 12/23  - CMV viral load incr from 537 to 1240 1/6, at 1280 on 1/13, ganciclovir    [ ] AMS  - Reintubated 11/20 Aspiration/hypoxia, extubated 11/24->[id number]--> extubated 11/26 -> [id number] -> tracheostomy 12/17 -> trach collar trials starting 12/29  - EEG 11/30 negative, Neurology following  - BH following   - off tacro  -CT Head No Cont (12/20): Age-indeterminate posterior left frontal lobe infarct.   -CT Head (12/25): Evolving subacute infarct in the left supramarginal gyrus  -repeat CTH ok, poor MS   - melatonin QHS     [ ] HTN/ pAFib  [ ] coronary vasospasm vs posterior wall MI  - remains off all a/c, failed hep gtt/argatroban due to UGIB  - Cards- plan for PCI prior to DC   - Off Amiodarone, off dobutamine  - BB as needed  - Dr. 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, cultures have since cleared  - Tx ID following - on Imipenem/Levo/Bactrim DS/Voriconazole  - 1/1 CT CAP: small pleural effusions, enteritis, rest ok  - all lines changed over 1/3  - Wound care for sacral decubitus ulcer  - CT chest/abd/pelvis 1/11 showed apical cavitating lesions likely fungal ID switched Caspo to Voriconazole  - 1/13 Blood Cx + Stenotrophomonas  - neupogen PRN for low WBC     [] MORAIMA:   - CRRT started 12/7, stopped 12/15, resumed CRRT 12/23, now on nocturnal CRRT negative 100cc/hr  - still anuric     [ ] UGIB s/p EGD (12/26) showing generalized oozing, coagulopathy  - Correct coagulopathy per SICU  - Protonix  - TF at goal    [] s/p OLT  - poor graft function, trial of plasmapheresis 1/3, 1/5, 1/7 for (3-5 days), PLEX #4 1/9,   - IVIG #1 on 1/8, #2 1/9   - PLEX and IVIG held 1/11 +1/12  - repeat liver US unchanged  - Diet: restart trickle feeds  - Pain management: avoid narcotics  - Strict I&Os, wound vac placed 12/10   - US: L brachial DVT (holding A/C)  - wound vac care  - ursodiol 300mgBID     [ ] Immunosuppression  - Tacrolimus stopped 11/18 in setting of AMS/Seizure, Started Cyclo 12/17, now held for worsening functional status   - Cyclo held, MMF HELD, Solumedrol 16 mg daily    [] lleus   -@ home on Linzess  - imaging with distended colon, improving, (likely opioid induced)  - s/p Neostigmine x 2  - s/p Relistor, last dose 12/1  - s/p colectomy with end ileostomy  (see above)  - low ileostomy output post lomotil, restarted trickle feeds  - Colorectal following  - replace losses as able  - Ileostomy output increasing (now 75cc/24 from 30), melanotic stool    [] CMV viremia  - CMV PCR (12/11 and 12/16): neg, positive CMV PCR on 12/23  - CMV viral load incr from 537 to 1240 1/6, at 1280 on 1/13, ganciclovir    [ ] AMS  - Reintubated 11/20 Aspiration/hypoxia, extubated 11/24->[id number]/24--> extubated 11/26 -> [id number]/7 -> tracheostomy 12/17 -> trach collar trials starting 12/29  - EEG 11/30 negative, Neurology following  - BH following   - off tacro  -CT Head No Cont (12/20): Age-indeterminate posterior left frontal lobe infarct.   -CT Head (12/25): Evolving subacute infarct in the left supramarginal gyrus  -repeat CTH ok, poor MS   - melatonin QHS     [ ] HTN/ pAFib  [ ] coronary vasospasm vs posterior wall MI  - remains off all a/c, failed hep gtt/argatroban due to UGIB  - Cards- plan for PCI prior to DC   - Off Amiodarone, off dobutamine  - BB as needed  - Dr. Quintanilla following    [ ] DM  - per SICU     [] Scrotal abscess   - US (12/12): 2.1 x0.9 x 3.2 cm focal, right testicle- possible abscess (12/12)  - Urology recs: CT scrotum review no debridement required  - Urology recs Derm consult for guidance on wound care   - 12/23 US doppler scrotum: no arterial flow, limited venous flow, bl hydrocele  - 12/15 CT CAP no acute changes   - Elevate scrotum w/ support Urology consult appreciated

## 2025-01-17 NOTE — PROGRESS NOTE ADULT - ASSESSMENT
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 Post-op course c/b worsening mental status and acute hypoxic respiratory failure requiring multiple intubations/extubations, currently extubated (as of 11/26/24) and colonic ileus s/p several rounds of Relistor and Neostigmine with NGT and rectal tube currently in place now with septic shock due to ischemic bowel s/p total colectomy with ostomy creation with MORAIMA on CRRT.      1. s/p OLT 11/15/24 with oliguric MORAIMA in setting of septic shock and poor liver function.   - CT AP non-con: Bilateral renal cysts including cysts with peripheral calcification in the left kidney.  - Initiated on CRRT 12/7/24- 12/15/24 at 1AM stopped. s/p IHD 12/18/24 however required levophed.   - Has been back on CRRT but stopped 1/7/25 due to catheter malfunction. Restarted 1/8/25 at 8PM with new catheter.   - Pt oliguric and hypotensive on vasopressors, continue Nocturnal CRRT (transitioned to nocturnal CRRT 1/14/25) UF 0-50cc/hr as tolerated.   - Monitor labs and I/Os. Avoid nephrotoxins including, ACE/ARB, NSAIDs, contrast, etc. Dose medications as per eGFR.     If you have any questions, please feel free to contact me  Vonnie Schaefer  Nephrology Fellow  [employer] Mobile/Page [phone]  (After 5pm or on weekends please page the on-call fellow)

## 2025-01-17 NOTE — PROGRESS NOTE ADULT - SUBJECTIVE AND OBJECTIVE BOX
Transplant Surgery - Multidisciplinary Rounds  --------------------------------------------------------------  OLT   11/15/2024         Colectomy 12/7/2024     IABP 12/7 removed 12/10  Tracheostomy 12/17/2024    Present: Patient seen and examined with multidisciplinary transplant team including Surgeon Dr. Hutcihnson, Hepatologist Dr. Hidalgo, ACP Earnestine/Jasmine/Ascencion, Pharmacist, and bedside RN during AM rounds. Disciplines not in attendance will be notified of plan.    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   s/p OLT 11/15 with post op course c/b:  Hypoxia: Reintubated 11/20, extubated 11/24->reintubated 11/24, extubated 11/26, reintubated 12/7, trached 12/17  Ileus   A fib  AMS/Seizure   L brachial DVT  Neutropenia  E coli Bacteremia (12/6)  s/p Colectomy 12/7.   IABP 12/7, removed 12/10  Ec Faecalis UTI (12/16)  Stenotrophamonas in trach aspirate (12/19)  Clostridium in blood 12/23  UGIB 12/26  CMV Viremia  MORAIMA requiring CRRT  Shock liver    Interval/Overnight Events:   - cyclo restarted   - tolerating trach collar all day  - increasing pressor requirements  - CRRT -100cc > net even  - ileostomy output increasing with melanotic stool  - given 2u PLT throughout past 24hr    Immunosuppression:  - Cyclo per level, MMF HELD, solumed 16  -ongoing monitoring for signs of rejection    MEDICATIONS  (STANDING):  albumin human 25% IVPB 50 milliLiter(s) IV Intermittent every 6 hours  buDESOnide    Inhalation Suspension 0.5 milliGRAM(s) Inhalation every 12 hours  chlorhexidine 0.12% Liquid 15 milliLiter(s) Oral Mucosa every 12 hours  chlorhexidine 2% Cloths 1 Application(s) Topical <User Schedule>  collagenase Ointment 1 Application(s) Topical daily  CRRT Treatment    <Continuous>  cycloSPORINE  , modified (GENGRAF) Solution 25 milliGRAM(s) Oral <User Schedule>  epoetin tonja (PROCRIT) Injectable 78383 Unit(s) IV Push every 7 days  ganciclovir IVPB 250 milliGRAM(s) IV Intermittent <User Schedule>  ganciclovir IVPB 125 milliGRAM(s) IV Intermittent <User Schedule>  insulin lispro (ADMELOG) corrective regimen sliding scale   SubCutaneous every 6 hours  insulin NPH human recombinant 6 Unit(s) SubCutaneous every 6 hours  levoFLOXacin IVPB 500 milliGRAM(s) IV Intermittent every 24 hours  melatonin 5 milliGRAM(s) Oral at bedtime  methylPREDNISolone sodium succinate Injectable 16 milliGRAM(s) IV Push daily  metoprolol tartrate Injectable 5 milliGRAM(s) IV Push every 6 hours  midodrine 30 milliGRAM(s) Oral every 8 hours  mupirocin 2% Ointment 1 Application(s) Topical every 12 hours  nystatin Powder 1 Application(s) Topical every 12 hours  pantoprazole  Injectable 40 milliGRAM(s) IV Push every 12 hours  phenylephrine    Infusion 4 MICROgram(s)/kG/Min (74.6 mL/Hr) IV Continuous <Continuous>  Phoxillum Filtration BK 4 / 2.5 5000 milliLiter(s) (1500 mL/Hr) CRRT <Continuous>  Phoxillum Filtration BK 4 / 2.5 5000 milliLiter(s) (1200 mL/Hr) CRRT <Continuous>  PrismaSOL Filtration BGK 4 / 2.5 5000 milliLiter(s) (200 mL/Hr) CRRT <Continuous>  QUEtiapine 25 milliGRAM(s) Oral at bedtime  sodium chloride 3%  Inhalation 4 milliLiter(s) Inhalation every 12 hours  trimethoprim / sulfamethoxazole IVPB 150 milliGRAM(s) IV Intermittent <User Schedule>  trimethoprim / sulfamethoxazole IVPB 300 milliGRAM(s) IV Intermittent <User Schedule>  vasopressin Infusion 0.03 Unit(s)/Min (4.5 mL/Hr) IV Continuous <Continuous>  voriconazole IVPB 200 milliGRAM(s) IV Intermittent every 12 hours    MEDICATIONS  (PRN):  albuterol/ipratropium for Nebulization 3 milliLiter(s) Nebulizer every 6 hours PRN Shortness of Breath and/or Wheezing  FIRST- Mouthwash  BLM 10 milliLiter(s) Swish and Spit every 8 hours PRN Mouth Care  HYDROmorphone  Injectable 0.25 milliGRAM(s) IV Push every 3 hours PRN Moderate Pain (4 - 6)      PAST MEDICAL & SURGICAL HISTORY:  Diabetes      Transaminitis      Paroxysmal atrial fibrillation      Depression      BPH (benign prostatic hyperplasia)      Hypertension      Chronic atrial fibrillation      Coronary artery disease      Hepatocellular carcinoma      DM (diabetes mellitus)      HTN (hypertension)      Paroxysmal atrial fibrillation      Cirrhosis      HCC (hepatocellular carcinoma)      History of BPH      History of laparoscopic cholecystectomy      History of lumbar laminectomy      H/O prior ablation treatment      H/O percutaneous left heart catheterization          Vital Signs Last 24 Hrs  T(C): 36.7 (16 Jan 2025 23:00), Max: 36.7 (16 Jan 2025 23:00)  T(F): 98.1 (16 Jan 2025 23:00), Max: 98.1 (16 Jan 2025 23:00)  HR: 86 (17 Jan 2025 04:15) (70 - 93)  BP: --  BP(mean): --  RR: 19 (17 Jan 2025 04:15) (10 - 39)  SpO2: 98% (17 Jan 2025 04:15) (92% - 100%)    Parameters below as of 17 Jan 2025 03:37  Patient On (Oxygen Delivery Method): ventilator    O2 Concentration (%): 40    I&O's Summary    15 Giancarlo 2025 07:01  -  16 Jan 2025 07:00  --------------------------------------------------------  IN: 3545.5 mL / OUT: 4753 mL / NET: -1207.5 mL    16 Jan 2025 07:01  -  17 Jan 2025 04:27  --------------------------------------------------------  IN: 3725.3 mL / OUT: 1882 mL / NET: 1843.3 mL                              9.7    0.97  )-----------( 17       ( 16 Jan 2025 18:23 )             28.0     01-17    134[L]  |  100  |  32[H]  ----------------------------<  199[H]  4.0   |  17[L]  |  <0.30[L]    Ca    8.6      17 Jan 2025 00:12  Phos  3.2     01-17  Mg     2.3     01-17    TPro  3.7[L]  /  Alb  2.8[L]  /  TBili  27.8[H]  /  DBili  x   /  AST  59[H]  /  ALT  33  /  AlkPhos  163[H]  01-17      ROS: Unable to assess patient is lethargic, s/p tracheostomy    PHYSICAL EXAM:   Constitutional:  awake, semi-responsive  Eyes:  PERRLA, Scleral icterus  ENMT: nc/at, no thrush, NGT  Neck: supple, trach   Respiratory: CTA B/L  Cardiovascular: RRR  Gastrointestinal: incision clean/dry/intact + Ostomy red low/no output, wound vac, peritoneal drains x1 bilious   Genitourinary: +scrotal edema w/ large fluid collection; black/green discoloration  Extremities: SCD's in place and working bilaterally, + UE/LE edema  Neurological: opens eyes to voice   Skin: large sacral decub, poor healing with breakdown Transplant Surgery - Multidisciplinary Rounds  --------------------------------------------------------------  OLT   11/15/2024         Colectomy 12/7/2024     IABP 12/7 removed 12/10  Tracheostomy 12/17/2024    Present: Patient seen and examined with multidisciplinary transplant team including Surgeon Dr. Hutchinson, Hepatologist Dr. Hidalgo, ACP Earnestine/Jasmine/Ascencion, Pharmacist, and bedside RN during AM rounds. Disciplines not in attendance will be notified of plan.    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   s/p OLT 11/15 with post op course c/b:  Hypoxia: Reintubated 11/20, extubated 11/24->reintubated 11/24, extubated 11/26, reintubated 12/7, trached 12/17  Ileus   A fib  AMS/Seizure   L brachial DVT  Neutropenia  E coli Bacteremia (12/6)  s/p Colectomy 12/7.   IABP 12/7, removed 12/10  Ec Faecalis UTI (12/16)  Stenotrophamonas in trach aspirate (12/19)  Clostridium in blood 12/23  UGIB 12/26  CMV Viremia  MORAIMA requiring CRRT  Shock liver    Interval/Overnight Events:   - cyclo restarted   - tolerating trach collar all day  - increasing pressor requirements  - CRRT -100cc > net even  - ileostomy output increasing with melanotic stool  - given 2u PLT throughout past 24hr    Immunosuppression:  - Cyclo per level, MMF HELD, solumed 16  -ongoing monitoring for signs of rejection      MEDICATIONS  (STANDING):  albumin human 25% IVPB 50 milliLiter(s) IV Intermittent every 6 hours  buDESOnide    Inhalation Suspension 0.5 milliGRAM(s) Inhalation every 12 hours  chlorhexidine 0.12% Liquid 15 milliLiter(s) Oral Mucosa every 12 hours  chlorhexidine 2% Cloths 1 Application(s) Topical <User Schedule>  collagenase Ointment 1 Application(s) Topical daily  CRRT Treatment    <Continuous>  epoetin tonja (PROCRIT) Injectable 86196 Unit(s) IV Push every 7 days  ganciclovir IVPB 250 milliGRAM(s) IV Intermittent <User Schedule>  ganciclovir IVPB 125 milliGRAM(s) IV Intermittent <User Schedule>  insulin lispro (ADMELOG) corrective regimen sliding scale   SubCutaneous every 6 hours  insulin NPH human recombinant 6 Unit(s) SubCutaneous every 6 hours  levoFLOXacin IVPB 500 milliGRAM(s) IV Intermittent every 24 hours  melatonin 5 milliGRAM(s) Oral at bedtime  methylPREDNISolone sodium succinate Injectable 16 milliGRAM(s) IV Push daily  metoprolol tartrate Injectable 5 milliGRAM(s) IV Push every 6 hours  midodrine 30 milliGRAM(s) Oral every 8 hours  mupirocin 2% Ointment 1 Application(s) Topical every 12 hours  nystatin Powder 1 Application(s) Topical every 12 hours  pantoprazole  Injectable 40 milliGRAM(s) IV Push every 12 hours  phenylephrine    Infusion 4 MICROgram(s)/kG/Min (74.6 mL/Hr) IV Continuous <Continuous>  Phoxillum Filtration BK 4 / 2.5 5000 milliLiter(s) (1500 mL/Hr) CRRT <Continuous>  Phoxillum Filtration BK 4 / 2.5 5000 milliLiter(s) (1200 mL/Hr) CRRT <Continuous>  PrismaSOL Filtration BGK 4 / 2.5 5000 milliLiter(s) (200 mL/Hr) CRRT <Continuous>  QUEtiapine 25 milliGRAM(s) Oral at bedtime  sodium chloride 3%  Inhalation 4 milliLiter(s) Inhalation every 12 hours  trimethoprim / sulfamethoxazole IVPB 150 milliGRAM(s) IV Intermittent <User Schedule>  trimethoprim / sulfamethoxazole IVPB 300 milliGRAM(s) IV Intermittent <User Schedule>  vasopressin Infusion 0.03 Unit(s)/Min (4.5 mL/Hr) IV Continuous <Continuous>  voriconazole IVPB 200 milliGRAM(s) IV Intermittent every 12 hours    MEDICATIONS  (PRN):  albuterol/ipratropium for Nebulization 3 milliLiter(s) Nebulizer every 6 hours PRN Shortness of Breath and/or Wheezing  FIRST- Mouthwash  BLM 10 milliLiter(s) Swish and Spit every 8 hours PRN Mouth Care  HYDROmorphone  Injectable 0.25 milliGRAM(s) IV Push every 3 hours PRN Moderate Pain (4 - 6)      PAST MEDICAL & SURGICAL HISTORY:  Diabetes      Transaminitis      Paroxysmal atrial fibrillation      Depression      BPH (benign prostatic hyperplasia)      Hypertension      Chronic atrial fibrillation      Coronary artery disease      Hepatocellular carcinoma      DM (diabetes mellitus)      HTN (hypertension)      Paroxysmal atrial fibrillation      Cirrhosis      HCC (hepatocellular carcinoma)      History of BPH      History of laparoscopic cholecystectomy      History of lumbar laminectomy      H/O prior ablation treatment      H/O percutaneous left heart catheterization          Vital Signs Last 24 Hrs  T(C): 36.7 (17 Jan 2025 07:00), Max: 36.7 (16 Jan 2025 23:00)  T(F): 98.1 (17 Jan 2025 07:00), Max: 98.1 (16 Jan 2025 23:00)  HR: 83 (17 Jan 2025 08:00) (71 - 93)  BP: --  BP(mean): --  RR: 13 (17 Jan 2025 08:00) (13 - 39)  SpO2: 98% (17 Jan 2025 08:00) (92% - 100%)    Parameters below as of 17 Jan 2025 07:00  Patient On (Oxygen Delivery Method): ventilator    O2 Concentration (%): 40    I&O's Summary    16 Jan 2025 07:01  -  17 Jan 2025 07:00  --------------------------------------------------------  IN: 4694.7 mL / OUT: 2386 mL / NET: 2308.7 mL    17 Jan 2025 07:01  -  17 Jan 2025 08:29  --------------------------------------------------------  IN: 304.1 mL / OUT: 0 mL / NET: 304.1 mL                              9.8    0.73  )-----------( 10       ( 17 Jan 2025 06:12 )             27.9     01-17    135  |  102  |  27[H]  ----------------------------<  154[H]  4.1   |  17[L]  |  <0.30[L]    Ca    8.5      17 Jan 2025 06:12  Phos  3.2     01-17  Mg     2.3     01-17    TPro  3.8[L]  /  Alb  3.3  /  TBili  28.5[H]  /  DBili  x   /  AST  64[H]  /  ALT  31  /  AlkPhos  168[H]  01-17          Culture - Blood (collected 01-13-25 @ 17:30)  Source: .Blood BLOOD  Preliminary Report (01-16-25 @ 22:01):    No growth at 72 Hours    Culture - Blood (collected 01-13-25 @ 17:13)  Source: .Blood BLOOD  Preliminary Report (01-16-25 @ 22:01):    No growth at 72 Hours    Culture - Sputum (collected 01-12-25 @ 09:52)  Source: Trach Asp Tracheal Aspirate  Gram Stain (01-12-25 @ 23:20):    No polymorphonuclear leukocytes per low power field    Rare Squamous epithelial cells per low power field    Numerous Gram Negative Rods per oil power field    Few Gram Positive Cocci in Clusters per oil power field    Rare Yeast like cells per oil power field  Final Report (01-15-25 @ 08:23):    Mixed gram negative rods including    Moderate Stenotrophomonas maltophilia    Commensal charity consistent with body site  Organism: Stenotrophomonas maltophilia (01-15-25 @ 08:23)  Organism: Stenotrophomonas maltophilia (01-15-25 @ 08:23)  Organism: Stenotrophomonas maltophilia (01-15-25 @ 08:23)    Culture - Blood (collected 01-11-25 @ 17:50)  Source: .Blood BLOOD  Final Report (01-17-25 @ 03:01):    No growth at 5 days    Culture - Blood (collected 01-11-25 @ 16:58)  Source: .Blood BLOOD  Gram Stain (01-13-25 @ 02:56):    Growth in aerobic bottle: Gram Negative Rods  Final Report (01-14-25 @ 16:07):    Growth in aerobic bottle: Stenotrophomonas maltophilia    Direct identification is available within approximately 3-5    hours either by Blood Panel Multiplexed PCR or Direct    MALDI-TOF. Details: [email or web address]  Organism: Blood Culture PCR  Stenotrophomonas maltophilia (01-14-25 @ 16:07)  Organism: Stenotrophomonas maltophilia (01-14-25 @ 16:07)  Organism: Stenotrophomonas maltophilia (01-14-25 @ 16:07)  Organism: Blood Culture PCR (01-14-25 @ 16:07)                ROS: Unable to assess patient is lethargic, s/p tracheostomy    PHYSICAL EXAM:   Constitutional:  awake, semi-responsive  Eyes:  PERRLA, Scleral icterus  ENMT: nc/at, no thrush, NGT  Neck: supple, trach   Respiratory: CTA B/L  Cardiovascular: RRR  Gastrointestinal: incision clean/dry/intact + Ostomy red low/no output, wound vac, peritoneal drains x1 bilious   Genitourinary: +scrotal edema w/ large fluid collection; black/green discoloration  Extremities: SCD's in place and working bilaterally, + UE/LE edema  Neurological: opens eyes to voice   Skin: large sacral decub, poor healing with breakdown

## 2025-01-17 NOTE — PROGRESS NOTE ADULT - SUBJECTIVE AND OBJECTIVE BOX
[facility] DIVISION OF KIDNEY DISEASES AND HYPERTENSION -- FOLLOW UP NOTE  --------------------------------------------------------------------------------  Chief Complaint: MORAIMA in OLT transplant    24 hour events/subjective: Pt. seen and examined at bedside earlier today. Pt. lying in bed, eyes open. Not responding to commands. On vasopressor support, off CRRT during day time.     PAST HISTORY  --------------------------------------------------------------------------------  No significant changes to PMH, PSH, FHx, SHx, unless otherwise noted    ALLERGIES & MEDICATIONS  --------------------------------------------------------------------------------  Allergies    No Known Allergies    Intolerances    Standing Inpatient Medications  albumin human  5% IVPB 250 milliLiter(s) IV Intermittent every 6 hours  albumin human 25% IVPB 50 milliLiter(s) IV Intermittent every 6 hours  buDESOnide    Inhalation Suspension 0.5 milliGRAM(s) Inhalation every 12 hours  chlorhexidine 0.12% Liquid 15 milliLiter(s) Oral Mucosa every 12 hours  chlorhexidine 2% Cloths 1 Application(s) Topical <User Schedule>  collagenase Ointment 1 Application(s) Topical daily  CRRT Treatment    <Continuous>  epoetin tonja (PROCRIT) Injectable 33030 Unit(s) IV Push every 7 days  filgrastim-sndz (ZARXIO) Injectable 480 MICROGram(s) SubCutaneous once  ganciclovir IVPB 250 milliGRAM(s) IV Intermittent <User Schedule>  ganciclovir IVPB 125 milliGRAM(s) IV Intermittent <User Schedule>  insulin lispro (ADMELOG) corrective regimen sliding scale   SubCutaneous every 6 hours  insulin NPH human recombinant 6 Unit(s) SubCutaneous every 6 hours  levoFLOXacin IVPB 500 milliGRAM(s) IV Intermittent every 24 hours  melatonin 5 milliGRAM(s) Oral at bedtime  methylPREDNISolone sodium succinate Injectable 16 milliGRAM(s) IV Push daily  metoprolol tartrate Injectable 5 milliGRAM(s) IV Push every 6 hours  midodrine 30 milliGRAM(s) Oral every 8 hours  mupirocin 2% Ointment 1 Application(s) Topical every 12 hours  nystatin Powder 1 Application(s) Topical every 12 hours  pantoprazole  Injectable 40 milliGRAM(s) IV Push every 12 hours  phenylephrine    Infusion 4 MICROgram(s)/kG/Min IV Continuous <Continuous>  Phoxillum Filtration BK 4 / 2.5 5000 milliLiter(s) CRRT <Continuous>  Phoxillum Filtration BK 4 / 2.5 5000 milliLiter(s) CRRT <Continuous>  PrismaSOL Filtration BGK 4 / 2.5 5000 milliLiter(s) CRRT <Continuous>  QUEtiapine 25 milliGRAM(s) Oral at bedtime  sodium chloride 3%  Inhalation 4 milliLiter(s) Inhalation every 12 hours  trimethoprim / sulfamethoxazole IVPB 150 milliGRAM(s) IV Intermittent <User Schedule>  trimethoprim / sulfamethoxazole IVPB 300 milliGRAM(s) IV Intermittent <User Schedule>  vasopressin Infusion 0.03 Unit(s)/Min IV Continuous <Continuous>  voriconazole IVPB 200 milliGRAM(s) IV Intermittent every 12 hours    PRN Inpatient Medications  albuterol/ipratropium for Nebulization 3 milliLiter(s) Nebulizer every 6 hours PRN  FIRST- Mouthwash  BLM 10 milliLiter(s) Swish and Spit every 8 hours PRN  HYDROmorphone  Injectable 0.25 milliGRAM(s) IV Push every 3 hours PRN    REVIEW OF SYSTEMS  --------------------------------------------------------------------------------  see above    VITALS/PHYSICAL EXAM  --------------------------------------------------------------------------------  T(C): 36.1 (01-17-25 @ 11:00), Max: 36.7 (01-16-25 @ 23:00)  HR: 87 (01-17-25 @ 12:00) (71 - 94)  BP: --  RR: 23 (01-17-25 @ 12:00) (11 - 42)  SpO2: 93% (01-17-25 @ 12:00) (92% - 100%)  Wt(kg): --    01-16-25 @ 07:01  -  01-17-25 @ 07:00  --------------------------------------------------------  IN: 4694.7 mL / OUT: 2386 mL / NET: 2308.7 mL    01-17-25 @ 07:01  -  01-17-25 @ 12:17  --------------------------------------------------------  IN: 1576.7 mL / OUT: 125 mL / NET: 1451.7 mL    Physical Exam:  Gen: on vent via tracheostomy. opens eyes.  HEENT: Icterus present  Abdomen: + ileostomy with liquid brown stool, VAC dressing present   MSK: +LE edema and UE edema.  Skin: Superficial skin ulceration b/l thighs/dark necrotic appearing scrotum.   Vascular: Right IJ temporary dialysis catheter     LABS/STUDIES  --------------------------------------------------------------------------------              9.8    0.73  >-----------<  10       [01-17-25 @ 06:12]              27.9     135  |  102  |  27  ----------------------------<  154      [01-17-25 @ 06:12]  4.1   |  17  |  <0.30        Ca     8.5     [01-17-25 @ 06:12]      Mg     2.3     [01-17-25 @ 06:12]      Phos  3.2     [01-17-25 @ 06:12]    TPro  3.8  /  Alb  3.3  /  TBili  28.5  /  DBili  x   /  AST  64  /  ALT  31  /  AlkPhos  168  [01-17-25 @ 06:12]    PT/INR: PT 24.4 , INR 2.16       [01-17-25 @ 06:12]  PTT: 67.5       [01-17-25 @ 06:12]    Creatinine Trend:  SCr <0.30 [01-17 @ 06:12]  SCr <0.30 [01-17 @ 00:12]  SCr <0.30 [01-16 @ 18:23]  SCr <0.30 [01-16 @ 06:49]  SCr <0.30 [01-16 @ 00:43]    Iron 76, TIBC Unable to calculate Test Repeated, %sat Unable to calculate Test Repeated      [01-12-25 @ 17:45]  Ferritin 867      [01-12-25 @ 17:45]  Vitamin D (25OH) <6.0      [11-21-24 @ 08:32]  TSH 0.68      [12-12-24 @ 12:27]  Lipid: chol 114, , HDL 47, LDL --      [04-24-24 @ 06:48]

## 2025-01-17 NOTE — PROGRESS NOTE ADULT - NS ATTEND AMEND GEN_ALL_CORE FT
s/p OLT 11/15 with post op course c/b:  Hypoxia: Reintubated 11/20, extubated 11/24->reintubated 11/24, extubated 11/26, reintubated 12/7, trached 12/17  Ileus   A fib  AMS/Seizure   L brachial DVT  Neutropenia  E coli Bacteremia (12/6)  s/p Colectomy 12/7.   IABP 12/7, removed 12/10  Ec Faecalis UTI (12/16)  Stenotrophomonas in trach aspirate (12/19)  Clostridium in blood 12/23  UGIB 12/26  CMV Viremia  MORAIMA requiring CRRT  Shock liver    Arousable, off sedatives   Tolerated TC during day and CPAP at night, , CXR stable  b/l effusion  Worsening of hemodynamics requiring very high dose of Aldo, Vaso, received albumin bolus  Increase tube feed to 30 cc, increase stoma output to 80 cc/24 hrs  nocturnal CRRT with net even for now  Abx Ganciclovir, Imipenem, Voriconazole for possible aspergillus, , Bactrim  Levaquin for persistent Stenotrophomonas bacteremia.  Intermittent alb  Received Filgrastim for refractory neutropenia, awaiting hematology recommendation  Not stable for plasmapheresis    Prognosis remains very poor  Family kept updated at bed side

## 2025-01-17 NOTE — PROGRESS NOTE ADULT - SUBJECTIVE AND OBJECTIVE BOX
Follow Up:      Interval History:    REVIEW OF SYSTEMS  [  ] ROS unobtainable because:    [  ] All other systems negative except as noted below    Constitutional:  [ ] fever [ ] chills  [ ] weight loss  [ ] weakness  Skin:  [ ] rash [ ] phlebitis	  Eyes: [ ] icterus [ ] pain  [ ] discharge	  ENMT: [ ] sore throat  [ ] thrush [ ] ulcers [ ] exudates  Respiratory: [ ] dyspnea [ ] hemoptysis [ ] cough [ ] sputum	  Cardiovascular:  [ ] chest pain [ ] palpitations [ ] edema	  Gastrointestinal:  [ ] nausea [ ] vomiting [ ] diarrhea [ ] constipation [ ] pain	  Genitourinary:  [ ] dysuria [ ] frequency [ ] hematuria [ ] discharge [ ] flank pain  [ ] incontinence  Musculoskeletal:  [ ] myalgias [ ] arthralgias [ ] arthritis  [ ] back pain  Neurological:  [ ] headache [ ] seizures  [ ] confusion/altered mental status    Allergies  No Known Allergies        ANTIMICROBIALS:  ganciclovir IVPB 250 <User Schedule>  ganciclovir IVPB 125 <User Schedule>  levoFLOXacin IVPB 500 every 24 hours  trimethoprim / sulfamethoxazole IVPB 150 <User Schedule>  trimethoprim / sulfamethoxazole IVPB 300 <User Schedule>  voriconazole IVPB 200 every 12 hours      OTHER MEDS:  MEDICATIONS  (STANDING):  albuterol/ipratropium for Nebulization 3 every 6 hours PRN  buDESOnide    Inhalation Suspension 0.5 every 12 hours  epoetin tonja (PROCRIT) Injectable 33590 every 7 days  filgrastim-sndz (ZARXIO) Injectable 480 once  HYDROmorphone  Injectable 0.25 every 3 hours PRN  insulin lispro (ADMELOG) corrective regimen sliding scale  every 6 hours  insulin NPH human recombinant 6 every 6 hours  melatonin 5 at bedtime  methylPREDNISolone sodium succinate Injectable 16 daily  metoprolol tartrate Injectable 5 every 6 hours  midodrine 30 every 8 hours  pantoprazole  Injectable 40 every 12 hours  phenylephrine    Infusion 4 <Continuous>  QUEtiapine 25 at bedtime  sodium chloride 3%  Inhalation 4 every 12 hours  vasopressin Infusion 0.03 <Continuous>      Vital Signs Last 24 Hrs  T(C): 36.1 (17 Jan 2025 11:00), Max: 36.7 (16 Jan 2025 23:00)  T(F): 97 (17 Jan 2025 11:00), Max: 98.1 (16 Jan 2025 23:00)  HR: 93 (17 Jan 2025 11:45) (71 - 94)  BP: --  BP(mean): --  RR: 36 (17 Jan 2025 11:45) (11 - 42)  SpO2: 93% (17 Jan 2025 11:45) (92% - 100%)    Parameters below as of 17 Jan 2025 11:00  Patient On (Oxygen Delivery Method): tracheostomy collar    O2 Concentration (%): 40    PHYSICAL EXAMINATION:  General: Alert and Awake, NAD  HEENT: PERRL, EOMI  Neck: Supple  Cardiac: RRR, No M/R/G  Resp: CTAB, No Wh/Rh/Ra  Abdomen: NBS, NT/ND, No HSM, No rigidity or guarding  MSK: No LE edema. No Calf tenderness  : No nichols  Skin: No rashes or lesions. Skin is warm and dry to the touch.   Neuro: Alert and Awake. CN 2-12 Grossly intact. Moves all four extremities spontaneously.  Psych: Calm, Pleasant, Cooperative                          9.8    0.73  )-----------( 10       ( 17 Jan 2025 06:12 )             27.9       01-17    135  |  102  |  27[H]  ----------------------------<  154[H]  4.1   |  17[L]  |  <0.30[L]    Ca    8.5      17 Jan 2025 06:12  Phos  3.2     01-17  Mg     2.3     01-17    TPro  3.8[L]  /  Alb  3.3  /  TBili  28.5[H]  /  DBili  x   /  AST  64[H]  /  ALT  31  /  AlkPhos  168[H]  01-17      Urinalysis Basic - ( 17 Jan 2025 06:12 )    Color: x / Appearance: x / SG: x / pH: x  Gluc: 154 mg/dL / Ketone: x  / Bili: x / Urobili: x   Blood: x / Protein: x / Nitrite: x   Leuk Esterase: x / RBC: x / WBC x   Sq Epi: x / Non Sq Epi: x / Bacteria: x        MICROBIOLOGY:  v  .Blood BLOOD  01-13-25   No growth at 72 Hours  --  --      .Blood BLOOD  01-13-25   No growth at 72 Hours  --  --      Trach Asp Tracheal Aspirate  01-12-25   Mixed gram negative rods including  Moderate Stenotrophomonas maltophilia  Commensal charity consistent with body site  --  Stenotrophomonas maltophilia      .Blood BLOOD  01-11-25   No growth at 5 days  --  --      .Blood BLOOD  01-11-25   Growth in aerobic bottle: Stenotrophomonas maltophilia  Direct identification is available within approximately 3-5  hours either by Blood Panel Multiplexed PCR or Direct  MALDI-TOF. Details: [email or web address]  --  Blood Culture PCR  Stenotrophomonas maltophilia      .Blood BLOOD  01-05-25   No growth at 5 days  --  --      .Blood BLOOD  01-05-25   No growth at 5 days  --  --      .Blood BLOOD  01-02-25   No growth at 5 days  --  --      .Blood BLOOD  01-01-25   No growth at 5 days  --  --      .Blood BLOOD  12-28-24   Growth in anaerobic bottle: Clostridium paraputrificum "Susceptibilities  not performed"  Direct identification is available within approximately 3-5  hours either by Blood Panel Multiplexed PCR or Direct  MALDI-TOF. Details: [email or web address]  --  Blood Culture PCR      .Blood BLOOD  12-28-24   No growth at 5 days  --  --      Body Fluid  12-26-24   Rare Enterococcus faecalis  --  Enterococcus faecalis      Body Fluid  12-26-24   Rare Enterococcus faecalis  --  Enterococcus faecalis      .Blood BLOOD  12-24-24   No growth at 5 days  --  --      Combi-Cath  12-23-24   Commensal charity consistent with body site  --    No polymorphonuclear cells seen per low power field  No squamous epithelial cells per low power field  No organisms seen per oil power field      .Blood BLOOD  12-23-24   Growth in anaerobic bottle: Clostridium paraputrificum "Susceptibilities  not performed"  Direct identification is available within approximately 3-5  hours either by Blood Panel Multiplexed PCR or Direct  MALDI-TOF. Details: [email or web address]  --  Blood Culture PCR      Abdominal Fl  12-20-24   Rare Enterococcus faecalis  --  Enterococcus faecalis      Trach Asp Tracheal Aspirate  12-19-24   Moderate Stenotrophomonas maltophilia  Commensal charity consistent with body site  --  Stenotrophomonas maltophilia      .Blood BLOOD  12-19-24   No growth at 5 days  --  --        HIV-1 RNA Quantitative, Viral Load Log: NOT DET. lg /mL (12-18-24 @ 22:14)  Toxoplasma IgG Screen: 78.00 IU/mL (11-15-24 @ 00:41)  CMV IgG Antibody: 1.50 U/mL (11-15-24 @ 00:41)    CMVPCR Log: 3.11 Epl79IM/mL (01-12 @ 17:45)        RADIOLOGY:    <The imaging below has been reviewed and visualized by me independently. Findings as detailed in report below> Follow Up:  shock    Interval History: afebrile. continued CRRT overnight    REVIEW OF SYSTEMS  [ x ] ROS unobtainable because:  encephalopathic  [  ] All other systems negative except as noted below    Constitutional:  [ ] fever [ ] chills  [ ] weight loss  [ ] weakness  Skin:  [ ] rash [ ] phlebitis	  Eyes: [ ] icterus [ ] pain  [ ] discharge	  ENMT: [ ] sore throat  [ ] thrush [ ] ulcers [ ] exudates  Respiratory: [ ] dyspnea [ ] hemoptysis [ ] cough [ ] sputum	  Cardiovascular:  [ ] chest pain [ ] palpitations [ ] edema	  Gastrointestinal:  [ ] nausea [ ] vomiting [ ] diarrhea [ ] constipation [ ] pain	  Genitourinary:  [ ] dysuria [ ] frequency [ ] hematuria [ ] discharge [ ] flank pain  [ ] incontinence  Musculoskeletal:  [ ] myalgias [ ] arthralgias [ ] arthritis  [ ] back pain  Neurological:  [ ] headache [ ] seizures  [ ] confusion/altered mental status    Allergies  No Known Allergies        ANTIMICROBIALS:  ganciclovir IVPB 250 <User Schedule>  ganciclovir IVPB 125 <User Schedule>  levoFLOXacin IVPB 500 every 24 hours  trimethoprim / sulfamethoxazole IVPB 150 <User Schedule>  trimethoprim / sulfamethoxazole IVPB 300 <User Schedule>  voriconazole IVPB 200 every 12 hours      OTHER MEDS:  MEDICATIONS  (STANDING):  albuterol/ipratropium for Nebulization 3 every 6 hours PRN  buDESOnide    Inhalation Suspension 0.5 every 12 hours  epoetin tonja (PROCRIT) Injectable 70398 every 7 days  filgrastim-sndz (ZARXIO) Injectable 480 once  HYDROmorphone  Injectable 0.25 every 3 hours PRN  insulin lispro (ADMELOG) corrective regimen sliding scale  every 6 hours  insulin NPH human recombinant 6 every 6 hours  melatonin 5 at bedtime  methylPREDNISolone sodium succinate Injectable 16 daily  metoprolol tartrate Injectable 5 every 6 hours  midodrine 30 every 8 hours  pantoprazole  Injectable 40 every 12 hours  phenylephrine    Infusion 4 <Continuous>  QUEtiapine 25 at bedtime  sodium chloride 3%  Inhalation 4 every 12 hours  vasopressin Infusion 0.03 <Continuous>      Vital Signs Last 24 Hrs  T(C): 36.1 (17 Jan 2025 11:00), Max: 36.7 (16 Jan 2025 23:00)  T(F): 97 (17 Jan 2025 11:00), Max: 98.1 (16 Jan 2025 23:00)  HR: 93 (17 Jan 2025 11:45) (71 - 94)  BP: --  BP(mean): --  RR: 36 (17 Jan 2025 11:45) (11 - 42)  SpO2: 93% (17 Jan 2025 11:45) (92% - 100%)    Parameters below as of 17 Jan 2025 11:00  Patient On (Oxygen Delivery Method): tracheostomy collar    O2 Concentration (%): 40    PHYSICAL EXAMINATION:  General: Alert and Awake, NAD  Neck: +Trach  Cardiac: RRR, No M/R/G  Resp: CTAB, No Wh/Rh/Ra  Abdomen: +wound vac over abdomen No HSM, No rigidity or guarding  MSK: No LE edema. No Calf tenderness  : Dressing over testes  Skin: No rashes or lesions. Skin is warm and dry to the touch.   Neuro: Alert and Awake. CN 2-12 Grossly intact. Moves all four extremities spontaneously.  Psych: Calm, Pleasant, Cooperative                          9.8    0.73  )-----------( 10       ( 17 Jan 2025 06:12 )             27.9       01-17    135  |  102  |  27[H]  ----------------------------<  154[H]  4.1   |  17[L]  |  <0.30[L]    Ca    8.5      17 Jan 2025 06:12  Phos  3.2     01-17  Mg     2.3     01-17    TPro  3.8[L]  /  Alb  3.3  /  TBili  28.5[H]  /  DBili  x   /  AST  64[H]  /  ALT  31  /  AlkPhos  168[H]  01-17      Urinalysis Basic - ( 17 Jan 2025 06:12 )    Color: x / Appearance: x / SG: x / pH: x  Gluc: 154 mg/dL / Ketone: x  / Bili: x / Urobili: x   Blood: x / Protein: x / Nitrite: x   Leuk Esterase: x / RBC: x / WBC x   Sq Epi: x / Non Sq Epi: x / Bacteria: x        MICROBIOLOGY:  v  .Blood BLOOD  01-13-25   No growth at 72 Hours  --  --      .Blood BLOOD  01-13-25   No growth at 72 Hours  --  --      Trach Asp Tracheal Aspirate  01-12-25   Mixed gram negative rods including  Moderate Stenotrophomonas maltophilia  Commensal charity consistent with body site  --  Stenotrophomonas maltophilia      .Blood BLOOD  01-11-25   No growth at 5 days  --  --      .Blood BLOOD  01-11-25   Growth in aerobic bottle: Stenotrophomonas maltophilia  Direct identification is available within approximately 3-5  hours either by Blood Panel Multiplexed PCR or Direct  MALDI-TOF. Details: [email or web address]  --  Blood Culture PCR  Stenotrophomonas maltophilia      .Blood BLOOD  01-05-25   No growth at 5 days  --  --      .Blood BLOOD  01-05-25   No growth at 5 days  --  --      .Blood BLOOD  01-02-25   No growth at 5 days  --  --      .Blood BLOOD  01-01-25   No growth at 5 days  --  --      .Blood BLOOD  12-28-24   Growth in anaerobic bottle: Clostridium paraputrificum "Susceptibilities  not performed"  Direct identification is available within approximately 3-5  hours either by Blood Panel Multiplexed PCR or Direct  MALDI-TOF. Details: [email or web address]  --  Blood Culture PCR      .Blood BLOOD  12-28-24   No growth at 5 days  --  --      Body Fluid  12-26-24   Rare Enterococcus faecalis  --  Enterococcus faecalis      Body Fluid  12-26-24   Rare Enterococcus faecalis  --  Enterococcus faecalis      .Blood BLOOD  12-24-24   No growth at 5 days  --  --      Combi-Cath  12-23-24   Commensal charity consistent with body site  --    No polymorphonuclear cells seen per low power field  No squamous epithelial cells per low power field  No organisms seen per oil power field      .Blood BLOOD  12-23-24   Growth in anaerobic bottle: Clostridium paraputrificum "Susceptibilities  not performed"  Direct identification is available within approximately 3-5  hours either by Blood Panel Multiplexed PCR or Direct  MALDI-TOF. Details: [email or web address]  --  Blood Culture PCR      Abdominal Fl  12-20-24   Rare Enterococcus faecalis  --  Enterococcus faecalis      Trach Asp Tracheal Aspirate  12-19-24   Moderate Stenotrophomonas maltophilia  Commensal charity consistent with body site  --  Stenotrophomonas maltophilia      .Blood BLOOD  12-19-24   No growth at 5 days  --  --        HIV-1 RNA Quantitative, Viral Load Log: NOT DET. lg /mL (12-18-24 @ 22:14)  Toxoplasma IgG Screen: 78.00 IU/mL (11-15-24 @ 00:41)  CMV IgG Antibody: 1.50 U/mL (11-15-24 @ 00:41)    CMVPCR Log: 3.11 Keo85EH/mL (01-12 @ 17:45)        RADIOLOGY:    <The imaging below has been reviewed and visualized by me independently. Findings as detailed in report below>    < from: Xray Chest 1 View- PORTABLE-Routine (Xray Chest 1 View- PORTABLE-Routine in AM.) (01.16.25 @ 06:28) >  IMPRESSION:  Decreasing congestion. NG tube as noted.    < end of copied text >

## 2025-01-18 NOTE — PROGRESS NOTE ADULT - NS ATTEND AMEND GEN_ALL_CORE FT
s/p OLT 11/15 with post op course c/b:  Hypoxia: Reintubated 11/20, extubated 11/24->reintubated 11/24, extubated 11/26, reintubated 12/7, trached 12/17  Ileus   A fib  AMS/Seizure   L brachial DVT  Neutropenia  E coli Bacteremia (12/6)  s/p Colectomy 12/7.   IABP 12/7, removed 12/10  Ec Faecalis UTI (12/16)  Stenotrophomonas in trach aspirate (12/19)  Clostridium in blood 12/23  UGIB 12/26  CMV Viremia  MORAIMA requiring CRRT  Shock liver    Arousable, off sedatives   Failed SBT,  CXR worsening  b/l effusion, will rest on vent today  Worsening of hemodynamics requiring very high dose of Aldo, Vaso, continue albumin bolus  Restart tube feed at 10 cc again  nocturnal CRRT with net even, one dose Bumex 2 mg IV  Abx Ganciclovir, Imipenem, Voriconazole for possible aspergillus, , Bactrim  Levaquin for persistent Stenotrophomonas bacteremia.  Filgrastim for refractory neutropenia, Nplate for thrombocytopenia, pt is pancytopenic   Not stable for plasmapheresis    Prognosis remains very poor  Extensive discussion with family DNR offered, want to continue aggressive treatment.

## 2025-01-18 NOTE — PROGRESS NOTE ADULT - ASSESSMENT
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, cultures have since cleared  - Tx ID following - on Imipenem/Levo/Bactrim DS/Voriconazole  - 1/1 CT CAP: small pleural effusions, enteritis, rest ok  - all lines changed over 1/3  - Wound care for sacral decubitus ulcer  - CT chest/abd/pelvis 1/11 showed apical cavitating lesions likely fungal ID switched Caspo to Voriconazole  - 1/13 Blood Cx + Stenotrophomonas  - neupogen PRN for low WBC     [] MORAIMA:   - CRRT started 12/7, stopped 12/15, resumed CRRT 12/23, now on nocturnal CRRT negative 100cc/hr  - still anuric     [ ] UGIB s/p EGD (12/26) showing generalized oozing, coagulopathy  - Correct coagulopathy per SICU  - Protonix  - TF at goal    [] s/p OLT  - poor graft function, trial of plasmapheresis 1/3, 1/5, 1/7 for (3-5 days), PLEX #4 1/9,   - IVIG #1 on 1/8, #2 1/9   - PLEX and IVIG held 1/11 +1/12  - repeat liver US unchanged  - Diet: restart trickle feeds  - Pain management: avoid narcotics  - Strict I&Os, wound vac placed 12/10   - US: L brachial DVT (holding A/C)  - wound vac care  - ursodiol 300mgBID     [ ] Immunosuppression  - Tacrolimus stopped 11/18 in setting of AMS/Seizure, Started Cyclo 12/17, now held for worsening functional status   - Cyclo held, MMF HELD, Solumedrol 16 mg daily    [] lleus   -@ home on Linzess  - imaging with distended colon, improving, (likely opioid induced)  - s/p Neostigmine x 2  - s/p Relistor, last dose 12/1  - s/p colectomy with end ileostomy  (see above)  - low ileostomy output post lomotil, restarted trickle feeds  - Colorectal following  - replace losses as able  - Ileostomy output increasing (now 75cc/24 from 30), melanotic stool    [] CMV viremia  - CMV PCR (12/11 and 12/16): neg, positive CMV PCR on 12/23  - CMV viral load incr from 537 to 1240 1/6, at 1280 on 1/13, ganciclovir    [ ] AMS  - Reintubated 11/20 Aspiration/hypoxia, extubated 11/24->[id number]/24--> extubated 11/26 -> [id number]/7 -> tracheostomy 12/17 -> trach collar trials starting 12/29  - EEG 11/30 negative, Neurology following  - BH following   - off tacro  -CT Head No Cont (12/20): Age-indeterminate posterior left frontal lobe infarct.   -CT Head (12/25): Evolving subacute infarct in the left supramarginal gyrus  -repeat CTH ok, poor MS   - melatonin QHS     [ ] HTN/ pAFib  [ ] coronary vasospasm vs posterior wall MI  - remains off all a/c, failed hep gtt/argatroban due to UGIB  - Cards- plan for PCI prior to DC   - Off Amiodarone, off dobutamine  - BB as needed  - Dr. Quintanilla following    [ ] DM  - per SICU     [] Scrotal abscess   - US (12/12): 2.1 x0.9 x 3.2 cm focal, right testicle- possible abscess (12/12)  - Urology recs: CT scrotum review no debridement required  - Urology recs Derm consult for guidance on wound care   - 12/23 US doppler scrotum: no arterial flow, limited venous flow, bl hydrocele  - 12/15 CT CAP no acute changes   - Elevate scrotum w/ support Urology consult appreciated

## 2025-01-18 NOTE — CHART NOTE - NSCHARTNOTEFT_GEN_A_CORE
CRRT prescription reviewed and renewed for nocturnal CRRT with interruption during day time to facilitative supportive nursing care/physical therapy.  I have seen the patient and reviewed CRRT prescription t. Vascular access is noted. CRRT prescription has been adjusted for optimized control of volume status, uremia and electrolytes. Management of additional metabolic abnormalities  will continue to be addressed on follow up. Fluid removal is limited by hemodynamic status.

## 2025-01-18 NOTE — PROGRESS NOTE ADULT - ASSESSMENT
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 Post op course complicated by acute hypoxic respiratory failure requiring intubation multiple times, most recently on 12/7 with conversion to tracheostomy on 12/17, e.coli bacteremia with RTOR on 12/7 for concern for worsening septic shock and cardiogenic shock s/p balloon pump placement and total abdominal colectomy in setting of ischemic bowel s/p OR on 12/9 for ileostomy creation, and olirugirc MORAIMA requiring CRRT and now intermittent HD.    Clinical course complicated by numerous infectious complications including Stenotrophomonas pneumonia, Enterococcus  faecalis isolated on abdominal drains and Clostridium paraputrificum bacteremia. Also with development of wounds including the sacrum and sloughing of scrotum in setting of loss of blood flow to the testes. Most recently with new cavitary pneumonia with coinciding Stenotrophomonas bacteremia.      BCx (1/11/25) Stenotrophomonas  Trach Culture (1/12/25) Stenotrophomonas  Fungitell 1/13 >500  Aspergillus galactomannan (1/12) 0.02    CT Chest (1/11/25) Cavitary left apical lesions measuring 1.9 cm and 1.5 cm in the region of previously groundglass opacity. Branching nodular opacities in the left upper lobe.     Cavitary pneumonia could be secondary to atypical pathogen although its also possible it is secondary to Stenotrophomonas    #Septic Shock, Cavitary pneumonia, Stenotrophomonas Bacteremia  --Recommend repeat CT Chest to reevaluate cavitary lung lesions  --Recommend 2x repeat blood culture sets  --If worsening pressor trend would add Imipenem back to patient's antibiotic regimen  --Continue IV Bactrim and IV Levofloxacin pending repeat CT Chest  --Continue Voriconazole 200 mg IV Q12H  --Follow up on Voriconazole trough    #Pancytopenia  Likely multifactorial etiology  --If CT Chest without worsening cavitation, may be able to switch Bactrim to minocycline and maintain Levofloxacin for double coverage.     #CMV Viremia  CMV resistance testing sent on 1/14  may need to switch to foscarnet for suspected resistance  received a dose of IVIG for hypogammaglobulinemia  --Restarted IV ganciclovir in induction dosing adjusted for CRRT  --Repeat next CMV PCR on 1/20/25    #Positive Blood Culture (12/23 Clostridium paraputrificum)   Clostridium paraputrificum is generally penicillin and metronidazole susceptible  --s/p Metronidazole and Imipenem course    #Liver Transplant Recipient, Prophylactic Antibiotic  --On ganciclovir already as above  --On bactrim already as above    prognosis remains very poor with multiple infections despite minimal immunosuppressive medications     I will continue to follow. Please feel free to contact me with any further questions.    Kyle Junior M.D.  [facility] Division of Infectious Disease  8AM-5PM Monday - Friday: Available on [employer] Teams  After Hours and Holidays (or if no response on [employer] Teams): Please contact the Infectious Diseases Office at [phone]    The above assessment and plan were discussed with SICU Team

## 2025-01-18 NOTE — PROGRESS NOTE ADULT - ASSESSMENT
74 male w/ a PMHx of HTN, DM, AF, BPH, MASH cirrhosis and HCC s/p OLT on 11/15. Case was uneventful but post-op course has been prolonged and c/b delirium, aspiration, multiple episodes of acute hypoxic respiratory failure requiring reintubation from 11/20-11/24 & 11/24-11/26, AF w/ RVR, ileus requiring NGT, distended colon requiring rectal tube, dysphagia, malnutrition, hypernatremia, fevers, pancytopenia, poorly controlled glucoses, and left brachial VTE. Patient went into severe refractory septic shock on 12/6 w/ blood cultures positive for E. coli s/p s/p ex lap, total abd colectomy, end ileostomy, 3 intentionally retained laparotomy pads for bleeding, Abthera, IABP placement w/ admission to CTICU, Patient required inotropes, Jacqui, and CRRT post-op. s/p closure 12/9. IABP removed 12/10 and transferred back to SICU 12/13. SICU course c/b narrow complex arrythmia w/ ECG showing new ST elevations concerning for posterior wall MI vs vasospasms, cards consulted and started on heparin gtt for empiric ACS tx which was c/b GIB then transitioned to argatroban c/b bleed. TTE revealed LVOT obstruction & TODD.     Neuro:  - Pain: PRN dilaudid  - seroquel and melatonin at bedtime  - Opens eyes intermittently, intermittently following commands, off sedation (more awake 1/13)  - CT head 11/19, 11/21, 11/25, 11/29, 12/5, 12/20, 1/11 negative  - EEG: Mild diffuse cerebral dysfunction that is not specific in etiology. No epileptic discharges recorded. No seizures recorded.  - Holding home xanax, Abilify, Zoloft, Remeron, Lexapro  - No need for MRI    Resp:  - S/p bedside tracheostomy 12/17  - trach collar daily, CPAP overnight  - c/w chest PT  - c/w inhaled bronchodilator  - c/w suctioning PRN    CV:  - on kassandra, vaso  - 250 5% x4 albumin for kassandra req  - midodrine 30mg q8h  - IV Metoprolol 5mg q6hr  - IABP removed 12/10  - TTE 12/6 w/ LVOT obstruction & TODD  - TTE 12/11 shows EF of 55-60%    GI:  - trend LFTs  - Tube feeds at 20cc/hr held for kassandra req  - all anti-motility agents held  - protonix BID  - monitor ALYSSA output  - CT 1/11: no obstruction, enteritis, foci of air concerning for early SBO  - holding cyclosporine    /Renal:  - nocturnal CRRT 6p-6a net negative 100cc/hr, made net even for kassandra req  - 50cc 25% albumin q6 for 24 hrs  - Monitor BUN/Cr, I&Os, trend UOP  - Monitor scrotal necrosis, maintain nichols at all times  - Bladder scan q12    Heme:  - trend H/H, PLTs, coags; Plt goal 20   - hep gtt and argatroban gtt held i/s/o bleed  - PLEX x4, last session 1/9    ID:  - ABX: Voriconazole, imipenem, levaquin, bactrim   - Ganciclovir for CMV   - holding immunosuppresion 2/2 cavitary lesion on CT chest  - 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

## 2025-01-18 NOTE — PROGRESS NOTE ADULT - SUBJECTIVE AND OBJECTIVE BOX
Follow Up:      Interval History:    REVIEW OF SYSTEMS  [  ] ROS unobtainable because:    [  ] All other systems negative except as noted below    Constitutional:  [ ] fever [ ] chills  [ ] weight loss  [ ] weakness  Skin:  [ ] rash [ ] phlebitis	  Eyes: [ ] icterus [ ] pain  [ ] discharge	  ENMT: [ ] sore throat  [ ] thrush [ ] ulcers [ ] exudates  Respiratory: [ ] dyspnea [ ] hemoptysis [ ] cough [ ] sputum	  Cardiovascular:  [ ] chest pain [ ] palpitations [ ] edema	  Gastrointestinal:  [ ] nausea [ ] vomiting [ ] diarrhea [ ] constipation [ ] pain	  Genitourinary:  [ ] dysuria [ ] frequency [ ] hematuria [ ] discharge [ ] flank pain  [ ] incontinence  Musculoskeletal:  [ ] myalgias [ ] arthralgias [ ] arthritis  [ ] back pain  Neurological:  [ ] headache [ ] seizures  [ ] confusion/altered mental status    Allergies  No Known Allergies        ANTIMICROBIALS:  ganciclovir IVPB 250 <User Schedule>  ganciclovir IVPB 125 <User Schedule>  levoFLOXacin IVPB 500 every 24 hours  trimethoprim / sulfamethoxazole IVPB 150 <User Schedule>  trimethoprim / sulfamethoxazole IVPB 300 <User Schedule>  voriconazole IVPB 200 every 12 hours      OTHER MEDS:  MEDICATIONS  (STANDING):  albuterol/ipratropium for Nebulization 3 every 6 hours PRN  buDESOnide    Inhalation Suspension 0.5 every 12 hours  epoetin tonja (PROCRIT) Injectable 12695 every 7 days  HYDROmorphone  Injectable 0.25 every 3 hours PRN  insulin lispro (ADMELOG) corrective regimen sliding scale  every 6 hours  insulin NPH human recombinant 6 every 6 hours  melatonin 5 at bedtime  methylPREDNISolone sodium succinate Injectable 16 daily  metoprolol tartrate Injectable 5 every 6 hours  midodrine 30 every 8 hours  pantoprazole  Injectable 40 every 12 hours  phenylephrine    Infusion 4 <Continuous>  QUEtiapine 25 at bedtime  romiPLOStim Injectable 100 every 7 days  sodium chloride 3%  Inhalation 4 every 12 hours  vasopressin Infusion 0.03 <Continuous>      Vital Signs Last 24 Hrs  T(C): 37.2 (18 Jan 2025 15:00), Max: 37.3 (18 Jan 2025 07:00)  T(F): 99 (18 Jan 2025 15:00), Max: 99.1 (18 Jan 2025 07:00)  HR: 91 (18 Jan 2025 15:35) (80 - 98)  BP: 133/63 (17 Jan 2025 19:45) (133/63 - 133/63)  BP(mean): 91 (17 Jan 2025 19:45) (91 - 91)  RR: 20 (18 Jan 2025 15:15) (10 - 34)  SpO2: 95% (18 Jan 2025 15:35) (93% - 99%)    Parameters below as of 18 Jan 2025 07:00  Patient On (Oxygen Delivery Method): ventilator    O2 Concentration (%): 40    PHYSICAL EXAMINATION:  General: Alert and Awake, NAD  HEENT: PERRL, EOMI  Neck: Supple  Cardiac: RRR, No M/R/G  Resp: CTAB, No Wh/Rh/Ra  Abdomen: NBS, NT/ND, No HSM, No rigidity or guarding  MSK: No LE edema. No Calf tenderness  : No nichols  Skin: No rashes or lesions. Skin is warm and dry to the touch.   Neuro: Alert and Awake. CN 2-12 Grossly intact. Moves all four extremities spontaneously.  Psych: Calm, Pleasant, Cooperative                          8.4    1.19  )-----------( 9        ( 18 Jan 2025 05:55 )             23.6       01-18    133[L]  |  100  |  32[H]  ----------------------------<  141[H]  4.2   |  18[L]  |  <0.30[L]    Ca    8.4      18 Jan 2025 11:45  Phos  3.7     01-18  Mg     2.3     01-18    TPro  3.8[L]  /  Alb  3.2[L]  /  TBili  27.7[H]  /  DBili  x   /  AST  52[H]  /  ALT  21  /  AlkPhos  152[H]  01-18      Urinalysis Basic - ( 18 Jan 2025 11:45 )    Color: x / Appearance: x / SG: x / pH: x  Gluc: 141 mg/dL / Ketone: x  / Bili: x / Urobili: x   Blood: x / Protein: x / Nitrite: x   Leuk Esterase: x / RBC: x / WBC x   Sq Epi: x / Non Sq Epi: x / Bacteria: x        MICROBIOLOGY:  v  .Blood Blood  01-17-25   Testing in progress  --  --      .Blood BLOOD  01-13-25   No growth at 4 days  --  --      .Blood BLOOD  01-13-25   No growth at 4 days  --  --      Trach Asp Tracheal Aspirate  01-12-25   Mixed gram negative rods including  Moderate Stenotrophomonas maltophilia  Commensal charity consistent with body site  --  Stenotrophomonas maltophilia      .Blood BLOOD  01-11-25   No growth at 5 days  --  --      .Blood BLOOD  01-11-25   Growth in aerobic bottle: Stenotrophomonas maltophilia  Direct identification is available within approximately 3-5  hours either by Blood Panel Multiplexed PCR or Direct  MALDI-TOF. Details: [email or web address]  --  Blood Culture PCR  Stenotrophomonas maltophilia      .Blood BLOOD  01-05-25   No growth at 5 days  --  --      .Blood BLOOD  01-05-25   No growth at 5 days  --  --      .Blood BLOOD  01-02-25   No growth at 5 days  --  --      .Blood BLOOD  01-01-25   No growth at 5 days  --  --      .Blood BLOOD  12-28-24   Growth in anaerobic bottle: Clostridium paraputrificum "Susceptibilities  not performed"  Direct identification is available within approximately 3-5  hours either by Blood Panel Multiplexed PCR or Direct  MALDI-TOF. Details: [email or web address]  --  Blood Culture PCR      .Blood BLOOD  12-28-24   No growth at 5 days  --  --      Body Fluid  12-26-24   Rare Enterococcus faecalis  --  Enterococcus faecalis      Body Fluid  12-26-24   Rare Enterococcus faecalis  --  Enterococcus faecalis      .Blood BLOOD  12-24-24   No growth at 5 days  --  --      Combi-Cath  12-23-24   Commensal charity consistent with body site  --    No polymorphonuclear cells seen per low power field  No squamous epithelial cells per low power field  No organisms seen per oil power field      .Blood BLOOD  12-23-24   Growth in anaerobic bottle: Clostridium paraputrificum "Susceptibilities  not performed"  Direct identification is available within approximately 3-5  hours either by Blood Panel Multiplexed PCR or Direct  MALDI-TOF. Details: [email or web address]  --  Blood Culture PCR      Abdominal Fl  12-20-24   Rare Enterococcus faecalis  --  Enterococcus faecalis        HIV-1 RNA Quantitative, Viral Load Log: NOT DET. lg /mL (12-18-24 @ 22:14)  Toxoplasma IgG Screen: 78.00 IU/mL (11-15-24 @ 00:41)  CMV IgG Antibody: 1.50 U/mL (11-15-24 @ 00:41)    CMVPCR Log: 3.11 Qky77YK/mL (01-12 @ 17:45)        RADIOLOGY:    <The imaging below has been reviewed and visualized by me independently. Findings as detailed in report below> Follow Up:  shock    Interval History: afebrile. uptrend in pressor requirements overnight.     REVIEW OF SYSTEMS  [ x ] ROS unobtainable because:  encephalopathic  [  ] All other systems negative except as noted below    Constitutional:  [ ] fever [ ] chills  [ ] weight loss  [ ] weakness  Skin:  [ ] rash [ ] phlebitis	  Eyes: [ ] icterus [ ] pain  [ ] discharge	  ENMT: [ ] sore throat  [ ] thrush [ ] ulcers [ ] exudates  Respiratory: [ ] dyspnea [ ] hemoptysis [ ] cough [ ] sputum	  Cardiovascular:  [ ] chest pain [ ] palpitations [ ] edema	  Gastrointestinal:  [ ] nausea [ ] vomiting [ ] diarrhea [ ] constipation [ ] pain	  Genitourinary:  [ ] dysuria [ ] frequency [ ] hematuria [ ] discharge [ ] flank pain  [ ] incontinence  Musculoskeletal:  [ ] myalgias [ ] arthralgias [ ] arthritis  [ ] back pain  Neurological:  [ ] headache [ ] seizures  [ ] confusion/altered mental status    Allergies  No Known Allergies        ANTIMICROBIALS:  ganciclovir IVPB 250 <User Schedule>  ganciclovir IVPB 125 <User Schedule>  levoFLOXacin IVPB 500 every 24 hours  trimethoprim / sulfamethoxazole IVPB 150 <User Schedule>  trimethoprim / sulfamethoxazole IVPB 300 <User Schedule>  voriconazole IVPB 200 every 12 hours      OTHER MEDS:  MEDICATIONS  (STANDING):  albuterol/ipratropium for Nebulization 3 every 6 hours PRN  buDESOnide    Inhalation Suspension 0.5 every 12 hours  epoetin tonja (PROCRIT) Injectable 65327 every 7 days  HYDROmorphone  Injectable 0.25 every 3 hours PRN  insulin lispro (ADMELOG) corrective regimen sliding scale  every 6 hours  insulin NPH human recombinant 6 every 6 hours  melatonin 5 at bedtime  methylPREDNISolone sodium succinate Injectable 16 daily  metoprolol tartrate Injectable 5 every 6 hours  midodrine 30 every 8 hours  pantoprazole  Injectable 40 every 12 hours  phenylephrine    Infusion 4 <Continuous>  QUEtiapine 25 at bedtime  romiPLOStim Injectable 100 every 7 days  sodium chloride 3%  Inhalation 4 every 12 hours  vasopressin Infusion 0.03 <Continuous>      Vital Signs Last 24 Hrs  T(C): 37.2 (18 Jan 2025 15:00), Max: 37.3 (18 Jan 2025 07:00)  T(F): 99 (18 Jan 2025 15:00), Max: 99.1 (18 Jan 2025 07:00)  HR: 91 (18 Jan 2025 15:35) (80 - 98)  BP: 133/63 (17 Jan 2025 19:45) (133/63 - 133/63)  BP(mean): 91 (17 Jan 2025 19:45) (91 - 91)  RR: 20 (18 Jan 2025 15:15) (10 - 34)  SpO2: 95% (18 Jan 2025 15:35) (93% - 99%)    Parameters below as of 18 Jan 2025 07:00  Patient On (Oxygen Delivery Method): ventilator    O2 Concentration (%): 40    PHYSICAL EXAMINATION:  General: awake but not alert, NAD  Neck: +Trach  Cardiac: RRR, No M/R/G  Resp: CTAB, No Wh/Rh/Ra  Abdomen: +wound vac over abdomen No HSM, No rigidity or guarding  MSK: No LE edema. No Calf tenderness  : Dressing over testes  Skin: No rashes or lesions. Skin is warm and dry to the touch.   Neuro: awake but not alert. CN 2-12 Grossly intact. Moves all four extremities spontaneously.  Psych: Encephalopathic - unable to assess                            8.4    1.19  )-----------( 9        ( 18 Jan 2025 05:55 )             23.6       01-18    133[L]  |  100  |  32[H]  ----------------------------<  141[H]  4.2   |  18[L]  |  <0.30[L]    Ca    8.4      18 Jan 2025 11:45  Phos  3.7     01-18  Mg     2.3     01-18    TPro  3.8[L]  /  Alb  3.2[L]  /  TBili  27.7[H]  /  DBili  x   /  AST  52[H]  /  ALT  21  /  AlkPhos  152[H]  01-18      Urinalysis Basic - ( 18 Jan 2025 11:45 )    Color: x / Appearance: x / SG: x / pH: x  Gluc: 141 mg/dL / Ketone: x  / Bili: x / Urobili: x   Blood: x / Protein: x / Nitrite: x   Leuk Esterase: x / RBC: x / WBC x   Sq Epi: x / Non Sq Epi: x / Bacteria: x        MICROBIOLOGY:  v  .Blood Blood  01-17-25   Testing in progress  --  --      .Blood BLOOD  01-13-25   No growth at 4 days  --  --      .Blood BLOOD  01-13-25   No growth at 4 days  --  --      Trach Asp Tracheal Aspirate  01-12-25   Mixed gram negative rods including  Moderate Stenotrophomonas maltophilia  Commensal charity consistent with body site  --  Stenotrophomonas maltophilia      .Blood BLOOD  01-11-25   No growth at 5 days  --  --      .Blood BLOOD  01-11-25   Growth in aerobic bottle: Stenotrophomonas maltophilia  Direct identification is available within approximately 3-5  hours either by Blood Panel Multiplexed PCR or Direct  MALDI-TOF. Details: [email or web address]  --  Blood Culture PCR  Stenotrophomonas maltophilia      .Blood BLOOD  01-05-25   No growth at 5 days  --  --      .Blood BLOOD  01-05-25   No growth at 5 days  --  --      .Blood BLOOD  01-02-25   No growth at 5 days  --  --      .Blood BLOOD  01-01-25   No growth at 5 days  --  --      .Blood BLOOD  12-28-24   Growth in anaerobic bottle: Clostridium paraputrificum "Susceptibilities  not performed"  Direct identification is available within approximately 3-5  hours either by Blood Panel Multiplexed PCR or Direct  MALDI-TOF. Details: [email or web address]  --  Blood Culture PCR      .Blood BLOOD  12-28-24   No growth at 5 days  --  --      Body Fluid  12-26-24   Rare Enterococcus faecalis  --  Enterococcus faecalis      Body Fluid  12-26-24   Rare Enterococcus faecalis  --  Enterococcus faecalis      .Blood BLOOD  12-24-24   No growth at 5 days  --  --      Combi-Cath  12-23-24   Commensal charity consistent with body site  --    No polymorphonuclear cells seen per low power field  No squamous epithelial cells per low power field  No organisms seen per oil power field      .Blood BLOOD  12-23-24   Growth in anaerobic bottle: Clostridium paraputrificum "Susceptibilities  not performed"  Direct identification is available within approximately 3-5  hours either by Blood Panel Multiplexed PCR or Direct  MALDI-TOF. Details: [email or web address]  --  Blood Culture PCR      Abdominal Fl  12-20-24   Rare Enterococcus faecalis  --  Enterococcus faecalis        HIV-1 RNA Quantitative, Viral Load Log: NOT DET. lg /mL (12-18-24 @ 22:14)  Toxoplasma IgG Screen: 78.00 IU/mL (11-15-24 @ 00:41)  CMV IgG Antibody: 1.50 U/mL (11-15-24 @ 00:41)    CMVPCR Log: 3.11 Xis95DM/mL (01-12 @ 17:45)        RADIOLOGY:    <The imaging below has been reviewed and visualized by me independently. Findings as detailed in report below>    < from: Xray Chest 1 View- PORTABLE-Routine (Xray Chest 1 View- PORTABLE-Routine in AM.) (01.18.25 @ 06:53) >  IMPRESSION:  Right infiltrate and effusions as noted.    < end of copied text >

## 2025-01-18 NOTE — PROGRESS NOTE ADULT - SUBJECTIVE AND OBJECTIVE BOX
Transplant Surgery - Multidisciplinary Rounds  --------------------------------------------------------------  OLT   11/15/2024         Colectomy 12/7/2024     IABP 12/7 removed 12/10  Tracheostomy 12/17/2024    Present: Patient seen and examined with multidisciplinary transplant team including Surgeon Dr. Hutchinson, Hepatologist Dr. Isabel Velasquez/[facility], Pharmacist, and bedside RN during AM rounds. Disciplines not in attendance will be notified of plan.    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   s/p OLT 11/15 with post op course c/b:  Hypoxia: Reintubated 11/20, extubated 11/24->reintubated 11/24, extubated 11/26, reintubated 12/7, trached 12/17  Ileus   A fib  AMS/Seizure   L brachial DVT  Neutropenia  E coli Bacteremia (12/6)  s/p Colectomy 12/7.   IABP 12/7, removed 12/10  Ec Faecalis UTI (12/16)  Stenotrophamonas in trach aspirate (12/19)  Clostridium in blood 12/23  UGIB 12/26  CMV Viremia  MORAIMA requiring CRRT  Shock liver    Interval/Overnight Events:                   Immunosuppression:  - Cyclo per level, MMF HELD, solumed 16  -ongoing monitoring for signs of rejection                ROS: Unable to assess patient is lethargic, s/p tracheostomy    PHYSICAL EXAM:   Constitutional:  awake, semi-responsive  Eyes:  PERRLA, Scleral icterus  ENMT: nc/at, no thrush, NGT  Neck: supple, trach   Respiratory: CTA B/L  Cardiovascular: RRR  Gastrointestinal: incision clean/dry/intact + Ostomy red low/no output, wound vac, peritoneal drains x1 bilious   Genitourinary: +scrotal edema w/ large fluid collection; black/green discoloration  Extremities: SCD's in place and working bilaterally, + UE/LE edema  Neurological: opens eyes to voice   Skin: large sacral decub, poor healing with breakdown

## 2025-01-18 NOTE — PROGRESS NOTE ADULT - SUBJECTIVE AND OBJECTIVE BOX
24 HOUR EVENTS:  - kassandra 3.4  - albumin 5% 250 x4  - 1 PRBC for CRRT circuit  - 2 plt, 1 FFP    NEURO  RASS (if intubated): 		[facility] ICU (if concern for delirium):  Exam: AOx0, lethargic  Meds: HYDROmorphone  Injectable 0.25 milliGRAM(s) IV Push every 3 hours PRN Moderate Pain (4 - 6)  melatonin 5 milliGRAM(s) Oral at bedtime  QUEtiapine 25 milliGRAM(s) Oral at bedtime      RESPIRATORY  RR: 12 (01-18-25 @ 01:15) (10 - 42)  SpO2: 97% (01-18-25 @ 01:15) (91% - 99%)  Wt(kg): --  Exam: Lungs CTA b/l  Mechanical Ventilation: Mode: AC/ CMV (Assist Control/ Continuous Mandatory Ventilation), RR (machine): 14, RR (patient): 17, TV (machine): 470, FiO2: 40, PEEP: 5, ITime: 0.9, MAP: 9, PIP: 19    Meds: albuterol/ipratropium for Nebulization 3 milliLiter(s) Nebulizer every 6 hours PRN Shortness of Breath and/or Wheezing  buDESOnide    Inhalation Suspension 0.5 milliGRAM(s) Inhalation every 12 hours  sodium chloride 3%  Inhalation 4 milliLiter(s) Inhalation every 12 hours      CARDIOVASCULAR  HR: 84 (01-18-25 @ 01:15) (81 - 94)  BP: 133/63 (01-17-25 @ 19:45) (133/63 - 133/63)  BP(mean): 91 (01-17-25 @ 19:45) (91 - 91)  ABP: 92/38 (01-18-25 @ 01:15) (92/38 - 122/41)  ABP(mean): 58 (01-18-25 @ 01:15) (58 - 77)  Wt(kg): --  CVP(cm H2O): --  VBG - ( 17 Jan 2025 06:12 )  pH: 7.32  /  pCO2: 38    /  pO2: 117   / HCO3: 20    / Base Excess: -6.0  /  SaO2: 99.0   Lactate: 2.9                Exam: Normal S1/S2 w/o murmurs or rubs  Cardiac Rhythm: sinus  Perfusion     [x ]Adequate   [ ]Inadequate  Mentation   [ ]Normal       [x ]Reduced  Extremities  [x ]Warm         [ ]Cool  Volume Status [ ]Hypervolemic [x ]Euvolemic [ ]Hypovolemic  Meds: metoprolol tartrate Injectable 5 milliGRAM(s) IV Push every 6 hours  midodrine 30 milliGRAM(s) Oral every 8 hours  phenylephrine    Infusion 4 MICROgram(s)/kG/Min IV Continuous <Continuous>      GI/NUTRITION  Exam: abd distended  Diet: tube feeds held  Last Bowel Movement: 17-Jan-2025 (01-17-25 @ 19:00)  Last Bowel Movement: 17-Jan-2025 (01-17-25 @ 07:00)  Last Bowel Movement: 17-Jan-2025 (01-16-25 @ 19:00)  Last Bowel Movement: 16-Jan-2025 (01-16-25 @ 07:00)  Last Bowel Movement: 14-Jan-2025 (01-14-25 @ 07:00)  Last Bowel Movement: 13-Jan-2025 (01-13-25 @ 19:00)      Meds: pantoprazole  Injectable 40 milliGRAM(s) IV Push every 12 hours      GENITOURINARY  I&O's Detail    01-16 @ 07:01 - 01-17 @ 07:00  --------------------------------------------------------  IN:    Albumin 5%  - 250 mL: 250 mL    Enteral Tube Flush: 150 mL    IV PiggyBack: 1550 mL    Phenylephrine: 321 mL    Phenylephrine: 1205.7 mL    Platelets - Single Donor: 450 mL    Vasopressin: 108 mL    Vital1.5: 660 mL  Total IN: 4694.7 mL    OUT:    Bulb (mL): 1050 mL    Ileostomy (mL): 75 mL    Indwelling Catheter - Urethral (mL): 60 mL    Nasogastric/Oral tube (mL): 150 mL    Other (mL): 1051 mL  Total OUT: 2386 mL    Total NET: 2308.7 mL      01-17 @ 07:01  - 01-18 @ 01:16  --------------------------------------------------------  IN:    Albumin 25%  -  50 mL: 200 mL    Albumin 5%  - 250 mL: 750 mL    Enteral Tube Flush: 110 mL    IV PiggyBack: 250 mL    IV PiggyBack: 1200 mL    Phenylephrine: 1369.5 mL    Plasma: 300 mL    Platelets - Single Donor: 450 mL    PRBCs (Packed Red Blood Cells): 300 mL    Vasopressin: 81 mL  Total IN: 5010.5 mL    OUT:    Bulb (mL): 665 mL    Ileostomy (mL): 125 mL    Indwelling Catheter - Urethral (mL): 20 mL    Other (mL): 338 mL    Vital1.5: 0 mL  Total OUT: 1148 mL    Total NET: 3862.5 mL          01-18    133[L]  |  100  |  30[H]  ----------------------------<  179[H]  4.0   |  18[L]  |  <0.30[L]    Ca    8.4      18 Jan 2025 00:19  Phos  3.6     01-18  Mg     2.3     01-18    TPro  3.8[L]  /  Alb  3.4  /  TBili  25.8[H]  /  DBili  x   /  AST  46[H]  /  ALT  23  /  AlkPhos  142[H]  01-18    Meds: albumin human  5% IVPB 250 milliLiter(s) IV Intermittent every 6 hours      HEMATOLOGIC  Meds: romiPLOStim Injectable 100 MICROGram(s) SubCutaneous every 7 days                          8.9    1.19  )-----------( 14       ( 17 Jan 2025 18:26 )             25.9     PT/INR - ( 17 Jan 2025 06:12 )   PT: 24.4 sec;   INR: 2.16 ratio         PTT - ( 17 Jan 2025 06:12 )  PTT:67.5 sec    INFECTIOUS DISEASES  T(C): 36.6 (01-17-25 @ 23:00), Max: 36.7 (01-17-25 @ 07:00)  Wt(kg): --  WBC Count: 1.19 K/uL (01-17 @ 18:26)  WBC Count: 0.73 K/uL (01-17 @ 06:12)    Recent Cultures:  Specimen Source: .Blood BLOOD, 01-13 @ 17:30; Results   No growth at 4 days; Gram Stain: --; Organism: --  Specimen Source: .Blood BLOOD, 01-13 @ 17:13; Results   No growth at 4 days; Gram Stain: --; Organism: --  Specimen Source: Trach Asp Tracheal Aspirate, 01-12 @ 09:52; Results   Mixed gram negative rods including  Moderate Stenotrophomonas maltophilia  Commensal charity consistent with body site[!]; Gram Stain:   No polymorphonuclear leukocytes per low power field  Rare Squamous epithelial cells per low power field  Numerous Gram Negative Rods per oil power field  Few Gram Positive Cocci in Clusters per oil power field  Rare Yeast like cells per oil power field[!]; Organism: Stenotrophomonas maltophilia[!]  Specimen Source: .Blood BLOOD, 01-11 @ 17:50; Results   No growth at 5 days; Gram Stain: --; Organism: --  Specimen Source: .Blood BLOOD, 01-11 @ 16:58; Results   Growth in aerobic bottle: Stenotrophomonas maltophilia  Direct identification is available within approximately 3-5  hours either by Blood Panel Multiplexed PCR or Direct  MALDI-TOF. Details: [email or web address][!]; Gram Stain:   Growth in aerobic bottle: Gram Negative Rods[!]; Organism: Blood Culture PCR  Stenotrophomonas maltophilia[!]    Meds: epoetin tonja (PROCRIT) Injectable 92011 Unit(s) IV Push every 7 days  ganciclovir IVPB 250 milliGRAM(s) IV Intermittent <User Schedule>  ganciclovir IVPB 125 milliGRAM(s) IV Intermittent <User Schedule>  levoFLOXacin IVPB 500 milliGRAM(s) IV Intermittent every 24 hours  trimethoprim / sulfamethoxazole IVPB 150 milliGRAM(s) IV Intermittent <User Schedule>  trimethoprim / sulfamethoxazole IVPB 300 milliGRAM(s) IV Intermittent <User Schedule>  voriconazole IVPB 200 milliGRAM(s) IV Intermittent every 12 hours      ENDOCRINE  Capillary Blood Glucose    Meds: insulin lispro (ADMELOG) corrective regimen sliding scale   SubCutaneous every 6 hours  insulin NPH human recombinant 6 Unit(s) SubCutaneous every 6 hours  methylPREDNISolone sodium succinate Injectable 16 milliGRAM(s) IV Push daily  vasopressin Infusion 0.03 Unit(s)/Min IV Continuous <Continuous>      ACCESS DEVICES:  [ ] Peripheral IV  [ ] Central Venous Line		[ ] R	[ ] L	[ ] IJ	[ ] Fem	[ ] SC	Placed:   [ ] Arterial Line			[ ] R	[ ] L	[ ] Fem	[ ] Rad	[ ] Ax	Placed:   [ ] PICC:					[ ] Mediport  [ ] Urinary Catheter, Date Placed:   [ ] Necessity of urinary, arterial, and venous catheters discussed    OTHER MEDICATIONS:  chlorhexidine 0.12% Liquid 15 milliLiter(s) Oral Mucosa every 12 hours  chlorhexidine 2% Cloths 1 Application(s) Topical <User Schedule>  collagenase Ointment 1 Application(s) Topical daily  CRRT Treatment    <Continuous>  FIRST- Mouthwash  BLM 10 milliLiter(s) Swish and Spit every 8 hours PRN  mupirocin 2% Ointment 1 Application(s) Topical every 12 hours  nystatin Powder 1 Application(s) Topical every 12 hours  Phoxillum Filtration BK 4 / 2.5 5000 milliLiter(s) CRRT <Continuous>  Phoxillum Filtration BK 4 / 2.5 5000 milliLiter(s) CRRT <Continuous>  PrismaSOL Filtration BGK 4 / 2.5 5000 milliLiter(s) CRRT <Continuous>      IMAGING:

## 2025-01-19 NOTE — PROGRESS NOTE ADULT - SUBJECTIVE AND OBJECTIVE BOX
Follow Up:      Interval History:    REVIEW OF SYSTEMS  [  ] ROS unobtainable because:    [  ] All other systems negative except as noted below    Constitutional:  [ ] fever [ ] chills  [ ] weight loss  [ ] weakness  Skin:  [ ] rash [ ] phlebitis	  Eyes: [ ] icterus [ ] pain  [ ] discharge	  ENMT: [ ] sore throat  [ ] thrush [ ] ulcers [ ] exudates  Respiratory: [ ] dyspnea [ ] hemoptysis [ ] cough [ ] sputum	  Cardiovascular:  [ ] chest pain [ ] palpitations [ ] edema	  Gastrointestinal:  [ ] nausea [ ] vomiting [ ] diarrhea [ ] constipation [ ] pain	  Genitourinary:  [ ] dysuria [ ] frequency [ ] hematuria [ ] discharge [ ] flank pain  [ ] incontinence  Musculoskeletal:  [ ] myalgias [ ] arthralgias [ ] arthritis  [ ] back pain  Neurological:  [ ] headache [ ] seizures  [ ] confusion/altered mental status    Allergies  No Known Allergies        ANTIMICROBIALS:  ganciclovir IVPB 250 <User Schedule>  ganciclovir IVPB 125 <User Schedule>  levoFLOXacin IVPB 500 every 24 hours  trimethoprim / sulfamethoxazole IVPB 150 <User Schedule>  trimethoprim / sulfamethoxazole IVPB 300 <User Schedule>  voriconazole IVPB 200 every 12 hours      OTHER MEDS:  MEDICATIONS  (STANDING):  albuterol/ipratropium for Nebulization 3 every 6 hours PRN  buDESOnide    Inhalation Suspension 0.5 every 12 hours  epoetin tonja (PROCRIT) Injectable 79294 every 7 days  HYDROmorphone  Injectable 0.25 every 3 hours PRN  insulin lispro (ADMELOG) corrective regimen sliding scale  every 6 hours  insulin NPH human recombinant 6 every 6 hours  melatonin 5 at bedtime  methylPREDNISolone sodium succinate Injectable 16 daily  metoprolol tartrate Injectable 2.5 every 6 hours  midodrine 30 every 8 hours  pantoprazole  Injectable 40 every 12 hours  phenylephrine    Infusion 4 <Continuous>  QUEtiapine 25 at bedtime  romiPLOStim Injectable 100 every 7 days  sodium chloride 3%  Inhalation 4 every 12 hours  vasopressin Infusion 0.03 <Continuous>      Vital Signs Last 24 Hrs  T(C): 36.5 (19 Jan 2025 11:00), Max: 37.4 (19 Jan 2025 03:00)  T(F): 97.7 (19 Jan 2025 11:00), Max: 99.3 (19 Jan 2025 03:00)  HR: 79 (19 Jan 2025 13:15) (79 - 91)  BP: 118/59 (18 Jan 2025 20:30) (118/59 - 118/59)  BP(mean): 85 (18 Jan 2025 20:30) (85 - 85)  RR: 26 (19 Jan 2025 13:15) (16 - 39)  SpO2: 96% (19 Jan 2025 13:15) (92% - 99%)    Parameters below as of 19 Jan 2025 07:00  Patient On (Oxygen Delivery Method): ventilator    O2 Concentration (%): 40    PHYSICAL EXAMINATION:  General: Alert and Awake, NAD  HEENT: PERRL, EOMI  Neck: Supple  Cardiac: RRR, No M/R/G  Resp: CTAB, No Wh/Rh/Ra  Abdomen: NBS, NT/ND, No HSM, No rigidity or guarding  MSK: No LE edema. No Calf tenderness  : No nichols  Skin: No rashes or lesions. Skin is warm and dry to the touch.   Neuro: Alert and Awake. CN 2-12 Grossly intact. Moves all four extremities spontaneously.  Psych: Calm, Pleasant, Cooperative                          8.7    1.24  )-----------( 11       ( 19 Jan 2025 05:41 )             24.2       01-19    133[L]  |  100  |  26[H]  ----------------------------<  105[H]  4.1   |  19[L]  |  <0.30[L]    Ca    8.3[L]      19 Jan 2025 11:48  Phos  3.6     01-19  Mg     2.3     01-19    TPro  3.8[L]  /  Alb  3.1[L]  /  TBili  28.7[H]  /  DBili  x   /  AST  59[H]  /  ALT  22  /  AlkPhos  163[H]  01-19      Urinalysis Basic - ( 19 Jan 2025 11:48 )    Color: x / Appearance: x / SG: x / pH: x  Gluc: 105 mg/dL / Ketone: x  / Bili: x / Urobili: x   Blood: x / Protein: x / Nitrite: x   Leuk Esterase: x / RBC: x / WBC x   Sq Epi: x / Non Sq Epi: x / Bacteria: x        MICROBIOLOGY:  v  .Blood Blood  01-17-25   Culture is being performed. Fungal cultures are held for 6 weeks.  --  --      .Blood BLOOD  01-13-25   No growth at 5 days  --  --      .Blood BLOOD  01-13-25   No growth at 5 days  --  --      Trach Asp Tracheal Aspirate  01-12-25   Mixed gram negative rods including  Moderate Stenotrophomonas maltophilia  Commensal charity consistent with body site  --  Stenotrophomonas maltophilia      .Blood BLOOD  01-11-25   No growth at 5 days  --  --      .Blood BLOOD  01-11-25   Growth in aerobic bottle: Stenotrophomonas maltophilia  Direct identification is available within approximately 3-5  hours either by Blood Panel Multiplexed PCR or Direct  MALDI-TOF. Details: [email or web address]  --  Blood Culture PCR  Stenotrophomonas maltophilia      .Blood BLOOD  01-05-25   No growth at 5 days  --  --      .Blood BLOOD  01-05-25   No growth at 5 days  --  --      .Blood BLOOD  01-02-25   No growth at 5 days  --  --      .Blood BLOOD  01-01-25   No growth at 5 days  --  --      .Blood BLOOD  12-28-24   Growth in anaerobic bottle: Clostridium paraputrificum "Susceptibilities  not performed"  Direct identification is available within approximately 3-5  hours either by Blood Panel Multiplexed PCR or Direct  MALDI-TOF. Details: [email or web address]  --  Blood Culture PCR      .Blood BLOOD  12-28-24   No growth at 5 days  --  --      Body Fluid  12-26-24   Rare Enterococcus faecalis  --  Enterococcus faecalis      Body Fluid  12-26-24   Rare Enterococcus faecalis  --  Enterococcus faecalis      .Blood BLOOD  12-24-24   No growth at 5 days  --  --      Combi-Cath  12-23-24   Commensal charity consistent with body site  --    No polymorphonuclear cells seen per low power field  No squamous epithelial cells per low power field  No organisms seen per oil power field      .Blood BLOOD  12-23-24   Growth in anaerobic bottle: Clostridium paraputrificum "Susceptibilities  not performed"  Direct identification is available within approximately 3-5  hours either by Blood Panel Multiplexed PCR or Direct  MALDI-TOF. Details: [email or web address]  --  Blood Culture PCR      Abdominal Fl  12-20-24   Rare Enterococcus faecalis  --  Enterococcus faecalis        HIV-1 RNA Quantitative, Viral Load Log: NOT DET. lg /mL (12-18-24 @ 22:14)  Toxoplasma IgG Screen: 78.00 IU/mL (11-15-24 @ 00:41)  CMV IgG Antibody: 1.50 U/mL (11-15-24 @ 00:41)    CMVPCR Log: 3.11 Lhg54RW/mL (01-12 @ 17:45)        RADIOLOGY:    <The imaging below has been reviewed and visualized by me independently. Findings as detailed in report below> Follow Up:  shock    Interval History: afebrile. no acute events.     REVIEW OF SYSTEMS  [ x ] ROS unobtainable because:  encephalopathic  [  ] All other systems negative except as noted below    Constitutional:  [ ] fever [ ] chills  [ ] weight loss  [ ] weakness  Skin:  [ ] rash [ ] phlebitis	  Eyes: [ ] icterus [ ] pain  [ ] discharge	  ENMT: [ ] sore throat  [ ] thrush [ ] ulcers [ ] exudates  Respiratory: [ ] dyspnea [ ] hemoptysis [ ] cough [ ] sputum	  Cardiovascular:  [ ] chest pain [ ] palpitations [ ] edema	  Gastrointestinal:  [ ] nausea [ ] vomiting [ ] diarrhea [ ] constipation [ ] pain	  Genitourinary:  [ ] dysuria [ ] frequency [ ] hematuria [ ] discharge [ ] flank pain  [ ] incontinence  Musculoskeletal:  [ ] myalgias [ ] arthralgias [ ] arthritis  [ ] back pain  Neurological:  [ ] headache [ ] seizures  [ ] confusion/altered mental status    Allergies  No Known Allergies        ANTIMICROBIALS:  ganciclovir IVPB 250 <User Schedule>  ganciclovir IVPB 125 <User Schedule>  levoFLOXacin IVPB 500 every 24 hours  trimethoprim / sulfamethoxazole IVPB 150 <User Schedule>  trimethoprim / sulfamethoxazole IVPB 300 <User Schedule>  voriconazole IVPB 200 every 12 hours      OTHER MEDS:  MEDICATIONS  (STANDING):  albuterol/ipratropium for Nebulization 3 every 6 hours PRN  buDESOnide    Inhalation Suspension 0.5 every 12 hours  epoetin tonja (PROCRIT) Injectable 56074 every 7 days  HYDROmorphone  Injectable 0.25 every 3 hours PRN  insulin lispro (ADMELOG) corrective regimen sliding scale  every 6 hours  insulin NPH human recombinant 6 every 6 hours  melatonin 5 at bedtime  methylPREDNISolone sodium succinate Injectable 16 daily  metoprolol tartrate Injectable 2.5 every 6 hours  midodrine 30 every 8 hours  pantoprazole  Injectable 40 every 12 hours  phenylephrine    Infusion 4 <Continuous>  QUEtiapine 25 at bedtime  romiPLOStim Injectable 100 every 7 days  sodium chloride 3%  Inhalation 4 every 12 hours  vasopressin Infusion 0.03 <Continuous>      Vital Signs Last 24 Hrs  T(C): 36.5 (19 Jan 2025 11:00), Max: 37.4 (19 Jan 2025 03:00)  T(F): 97.7 (19 Jan 2025 11:00), Max: 99.3 (19 Jan 2025 03:00)  HR: 79 (19 Jan 2025 13:15) (79 - 91)  BP: 118/59 (18 Jan 2025 20:30) (118/59 - 118/59)  BP(mean): 85 (18 Jan 2025 20:30) (85 - 85)  RR: 26 (19 Jan 2025 13:15) (16 - 39)  SpO2: 96% (19 Jan 2025 13:15) (92% - 99%)    Parameters below as of 19 Jan 2025 07:00  Patient On (Oxygen Delivery Method): ventilator    O2 Concentration (%): 40    PHYSICAL EXAMINATION:  General: awake but not alert, NAD  Neck: +Trach  Cardiac: RRR, No M/R/G  Resp: CTAB, No Wh/Rh/Ra  Abdomen: +wound vac over abdomen No HSM, No rigidity or guarding  MSK: No LE edema. No Calf tenderness  : Dressing over testes  Skin: No rashes or lesions. Skin is warm and dry to the touch.   Neuro: awake but not alert. CN 2-12 Grossly intact. Moves all four extremities spontaneously.  Psych: Encephalopathic - unable to assess                          8.7    1.24  )-----------( 11       ( 19 Jan 2025 05:41 )             24.2       01-19    133[L]  |  100  |  26[H]  ----------------------------<  105[H]  4.1   |  19[L]  |  <0.30[L]    Ca    8.3[L]      19 Jan 2025 11:48  Phos  3.6     01-19  Mg     2.3     01-19    TPro  3.8[L]  /  Alb  3.1[L]  /  TBili  28.7[H]  /  DBili  x   /  AST  59[H]  /  ALT  22  /  AlkPhos  163[H]  01-19      Urinalysis Basic - ( 19 Jan 2025 11:48 )    Color: x / Appearance: x / SG: x / pH: x  Gluc: 105 mg/dL / Ketone: x  / Bili: x / Urobili: x   Blood: x / Protein: x / Nitrite: x   Leuk Esterase: x / RBC: x / WBC x   Sq Epi: x / Non Sq Epi: x / Bacteria: x        MICROBIOLOGY:  v  .Blood Blood  01-17-25   Culture is being performed. Fungal cultures are held for 6 weeks.  --  --      .Blood BLOOD  01-13-25   No growth at 5 days  --  --      .Blood BLOOD  01-13-25   No growth at 5 days  --  --      Trach Asp Tracheal Aspirate  01-12-25   Mixed gram negative rods including  Moderate Stenotrophomonas maltophilia  Commensal charity consistent with body site  --  Stenotrophomonas maltophilia      .Blood BLOOD  01-11-25   No growth at 5 days  --  --      .Blood BLOOD  01-11-25   Growth in aerobic bottle: Stenotrophomonas maltophilia  Direct identification is available within approximately 3-5  hours either by Blood Panel Multiplexed PCR or Direct  MALDI-TOF. Details: [email or web address]  --  Blood Culture PCR  Stenotrophomonas maltophilia      .Blood BLOOD  01-05-25   No growth at 5 days  --  --      .Blood BLOOD  01-05-25   No growth at 5 days  --  --      .Blood BLOOD  01-02-25   No growth at 5 days  --  --      .Blood BLOOD  01-01-25   No growth at 5 days  --  --      .Blood BLOOD  12-28-24   Growth in anaerobic bottle: Clostridium paraputrificum "Susceptibilities  not performed"  Direct identification is available within approximately 3-5  hours either by Blood Panel Multiplexed PCR or Direct  MALDI-TOF. Details: [email or web address]  --  Blood Culture PCR      .Blood BLOOD  12-28-24   No growth at 5 days  --  --      Body Fluid  12-26-24   Rare Enterococcus faecalis  --  Enterococcus faecalis      Body Fluid  12-26-24   Rare Enterococcus faecalis  --  Enterococcus faecalis      .Blood BLOOD  12-24-24   No growth at 5 days  --  --      Combi-Cath  12-23-24   Commensal charity consistent with body site  --    No polymorphonuclear cells seen per low power field  No squamous epithelial cells per low power field  No organisms seen per oil power field      .Blood BLOOD  12-23-24   Growth in anaerobic bottle: Clostridium paraputrificum "Susceptibilities  not performed"  Direct identification is available within approximately 3-5  hours either by Blood Panel Multiplexed PCR or Direct  MALDI-TOF. Details: [email or web address]  --  Blood Culture PCR      Abdominal Fl  12-20-24   Rare Enterococcus faecalis  --  Enterococcus faecalis        HIV-1 RNA Quantitative, Viral Load Log: NOT DET. lg /mL (12-18-24 @ 22:14)  Toxoplasma IgG Screen: 78.00 IU/mL (11-15-24 @ 00:41)  CMV IgG Antibody: 1.50 U/mL (11-15-24 @ 00:41)    CMVPCR Log: 3.11 Wpj45CI/mL (01-12 @ 17:45)        RADIOLOGY:    <The imaging below has been reviewed and visualized by me independently. Findings as detailed in report below>    < from: Xray Chest 1 View- PORTABLE-Routine (Xray Chest 1 View- PORTABLE-Routine in AM.) (01.18.25 @ 06:53) >  IMPRESSION:  Right infiltrate and effusions as noted.    < end of copied text >

## 2025-01-19 NOTE — PROGRESS NOTE ADULT - SUBJECTIVE AND OBJECTIVE BOX
Patient is on CRRT for MORAIMA, post liver transplant, hemodynamic instability    Vital Signs Last 24 Hrs  T(C): 36.9 (01-19-25 @ 15:00)  T(F): 98.4 (01-19-25 @ 15:00), Max: 99.3 (01-19-25 @ 03:00)  HR: 77 (01-19-25 @ 18:00) (75 - 87)  BP: 118/59 (01-18-25 @ 20:30)  BP(mean): 85 (01-18-25 @ 20:30) (85 - 85)  RR: 15 (01-19-25 @ 18:00) (13 - 39)  SpO2: 98% (01-19-25 @ 18:00) (93% - 99%)  Wt(kg): --    01-18 @ 07:01  -  01-19 @ 07:00  --------------------------------------------------------  IN: 3434.8 mL / OUT: 2143 mL / NET: 1291.8 mL    01-19 @ 07:01  -  01-19 @ 18:36  --------------------------------------------------------  IN: 1087.8 mL / OUT: 1980 mL / NET: -892.2 mL    Vascular access functining.    LABS/STUDIES  --------------------------------------------------------------------------------              8.1    0.77  >-----------<  7        [01-19-25 @ 18:02]              23.3     134  |  102  |  22  ----------------------------<  79      [01-19-25 @ 18:02]  4.1   |  18  |  <0.30        Ca     8.4     [01-19-25 @ 18:02]      Mg     2.4     [01-19-25 @ 18:02]      Phos  3.6     [01-19-25 @ 18:02]    TPro  3.7  /  Alb  3.1  /  TBili  x   /  DBili  x   /  AST  63  /  ALT  20  /  AlkPhos  170  [01-19-25 @ 18:02]    PT/INR: PT 24.4 , INR 2.16       [01-19-25 @ 05:41]  PTT: 63.0       [01-19-25 @ 05:41]      Creatinine Trend:  SCr <0.30 [01-19 @ 18:02]  SCr <0.30 [01-19 @ 11:48]  SCr <0.30 [01-19 @ 05:41]  SCr <0.30 [01-19 @ 00:07]  SCr <0.30 [01-18 @ 18:07]

## 2025-01-19 NOTE — PROGRESS NOTE ADULT - PROBLEM SELECTOR PROBLEM 3
Cardiogenic shock
Electrolyte and fluid disorder
Electrolyte and fluid disorder
Status post liver transplant

## 2025-01-19 NOTE — PROGRESS NOTE ADULT - ASSESSMENT
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, cultures have since cleared  - Tx ID following - on Imipenem/Levo/Bactrim DS/Voriconazole  - 1/1 CT CAP: small pleural effusions, enteritis, rest ok  - all lines changed over 1/3  - Wound care for sacral decubitus ulcer  - CT chest/abd/pelvis 1/11 showed apical cavitating lesions likely fungal ID switched Caspo to Voriconazole  - 1/13 Blood Cx + Stenotrophomonas  - neupogen PRN for low WBC     [] MORAIMA:   - CRRT started 12/7, stopped 12/15, resumed CRRT 12/23, now on nocturnal CRRT negative 100cc/hr  - still anuric     [ ] UGIB s/p EGD (12/26) showing generalized oozing, coagulopathy  - Correct coagulopathy per SICU  - Protonix  - TF at goal    [] s/p OLT  - poor graft function, trial of plasmapheresis 1/3, 1/5, 1/7 for (3-5 days), PLEX #4 1/9,   - IVIG #1 on 1/8, #2 1/9   - PLEX and IVIG held 1/11 +1/12  - repeat liver US unchanged  - Diet: restart trickle feeds  - Pain management: avoid narcotics  - Strict I&Os, wound vac placed 12/10   - US: L brachial DVT (holding A/C)  - wound vac care  - ursodiol 300mgBID     [ ] Immunosuppression  - Tacrolimus stopped 11/18 in setting of AMS/Seizure, Started Cyclo 12/17, now held for worsening functional status   - Cyclo held, MMF HELD, Solumedrol 16 mg daily    [] lleus   -@ home on Linzess  - imaging with distended colon, improving, (likely opioid induced)  - s/p Neostigmine x 2  - s/p Relistor, last dose 12/1  - s/p colectomy with end ileostomy  (see above)  - low ileostomy output post lomotil, restarted trickle feeds  - Colorectal following  - replace losses as able  - Ileostomy output increasing (now 75cc/24 from 30), melanotic stool    [] CMV viremia  - CMV PCR (12/11 and 12/16): neg, positive CMV PCR on 12/23  - CMV viral load incr from 537 to 1240 1/6, at 1280 on 1/13, ganciclovir    [ ] AMS  - Reintubated 11/20 Aspiration/hypoxia, extubated 11/24->[id number]/24--> extubated 11/26 -> [id number]/7 -> tracheostomy 12/17 -> trach collar trials starting 12/29  - EEG 11/30 negative, Neurology following  - BH following   - off tacro  -CT Head No Cont (12/20): Age-indeterminate posterior left frontal lobe infarct.   -CT Head (12/25): Evolving subacute infarct in the left supramarginal gyrus  -repeat CTH ok, poor MS   - melatonin QHS     [ ] HTN/ pAFib  [ ] coronary vasospasm vs posterior wall MI  - remains off all a/c, failed hep gtt/argatroban due to UGIB  - Cards- plan for PCI prior to DC   - Off Amiodarone, off dobutamine  - BB as needed  - Dr. 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, cultures have since cleared  - Tx ID following - on Imipenem/Levo/Bactrim DS/Voriconazole  - 1/1 CT CAP: small pleural effusions, enteritis, rest ok  - all lines changed over 1/3  - Wound care for sacral decubitus ulcer  - CT chest/abd/pelvis 1/11 showed apical cavitating lesions likely fungal ID switched Caspo to Voriconazole  - 1/13 Blood Cx + Stenotrophomonas  - neupogen PRN for low WBC     [] MORAIMA:   - CRRT started 12/7, stopped 12/15, resumed CRRT 12/23, now on nocturnal CRRT negative 50cc/hr  - still anuric     [ ] UGIB s/p EGD (12/26) showing generalized oozing, coagulopathy  - Correct coagulopathy per SICU  - Protonix  - TF at goal  -nplate started q7days for thrombocytopenia    [] s/p OLT  - poor graft function, trial of plasmapheresis 1/3, 1/5, 1/7 for (3-5 days), PLEX #4 1/9,   - IVIG #1 on 1/8, #2 1/9   - PLEX and IVIG held 1/11 +1/12  - repeat liver US unchanged  - Diet: restart trickle feeds  - Pain management: avoid narcotics  - Strict I&Os, wound vac placed 12/10   - US: L brachial DVT (holding A/C)  - wound vac care  - ursodiol 300mgBID     [ ] Immunosuppression  - Tacrolimus stopped 11/18 in setting of AMS/Seizure, Started Cyclo 12/17, now held for worsening functional status   - Cyclo held, MMF HELD, Solumedrol 16 mg daily    [] lleus   -@ home on Linzess  - imaging with distended colon, improving, (likely opioid induced)  - s/p Neostigmine x 2  - s/p Relistor, last dose 12/1  - s/p colectomy with end ileostomy  (see above)  - low ileostomy output post lomotil, restarted trickle feeds  - Colorectal following  - replace losses as able  - Ileostomy output increasing (now 75cc/24 from 30), melanotic stool    [] CMV viremia  - CMV PCR (12/11 and 12/16): neg, positive CMV PCR on 12/23  - CMV viral load incr from 537 to 1240 1/6, at 1280 on 1/13, ganciclovir    [ ] AMS  - Reintubated 11/20 Aspiration/hypoxia, extubated 11/24->xgmexphocnk90/24--> extubated 11/26 -> xljogmzimns09/7 -> tracheostomy 12/17 -> trach collar trials starting 12/29  - EEG 11/30 negative, Neurology following  - BH following   - off tacro  -CT Head No Cont (12/20): Age-indeterminate posterior left frontal lobe infarct.   -CT Head (12/25): Evolving subacute infarct in the left supramarginal gyrus  -repeat CTH ok, poor MS   - melatonin QHS     [ ] HTN/ pAFib  [ ] coronary vasospasm vs posterior wall MI  - remains off all a/c, failed hep gtt/argatroban due to UGIB  - Cards- plan for PCI prior to DC   - Off Amiodarone, off dobutamine  - BB as needed  - Dr. Quintanilla following    [ ] DM  - per SICU     [] Scrotal abscess   - US (12/12): 2.1 x0.9 x 3.2 cm focal, right testicle- possible abscess (12/12)  - Urology recs: CT scrotum review no debridement required  - Urology recs Derm consult for guidance on wound care   - 12/23 US doppler scrotum: no arterial flow, limited venous flow, bl hydrocele  - 12/15 CT CAP no acute changes   - Elevate scrotum w/ support Urology consult appreciated

## 2025-01-19 NOTE — PROGRESS NOTE ADULT - SUBJECTIVE AND OBJECTIVE BOX
Transplant Surgery - Multidisciplinary Rounds  --------------------------------------------------------------  OLT   11/15/2024         Colectomy 12/7/2024     IABP 12/7 removed 12/10  Tracheostomy 12/17/2024    Present: Patient seen and examined with multidisciplinary transplant team including Surgeon Dr. Hutchinson, Hepatologist Dr. Hall [facility] Pharmacist, and bedside RN during AM rounds. Disciplines not in attendance will be notified of plan.    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   s/p OLT 11/15 with post op course c/b:  Hypoxia: Reintubated 11/20, extubated 11/24->reintubated 11/24, extubated 11/26, reintubated 12/7, trached 12/17  Ileus   A fib  AMS/Seizure   L brachial DVT  Neutropenia  E coli Bacteremia (12/6)  s/p Colectomy 12/7.   IABP 12/7, removed 12/10  Ec Faecalis UTI (12/16)  Stenotrophamonas in trach aspirate (12/19)  Clostridium in blood 12/23  UGIB 12/26  CMV Viremia  MORAIMA requiring CRRT  Shock liver    Interval/Overnight Events:                   Immunosuppression:  - Cyclo per level, MMF HELD, solumed 16  -ongoing monitoring for signs of rejection                ROS: Unable to assess patient is lethargic, s/p tracheostomy    PHYSICAL EXAM:   Constitutional:  awake, semi-responsive  Eyes:  PERRLA, Scleral icterus  ENMT: nc/at, no thrush, NGT  Neck: supple, trach   Respiratory: CTA B/L  Cardiovascular: RRR  Gastrointestinal: incision clean/dry/intact + Ostomy red low/no output, wound vac, peritoneal drains x1 bilious   Genitourinary: +scrotal edema w/ large fluid collection; black/green discoloration  Extremities: SCD's in place and working bilaterally, + UE/LE edema  Neurological: opens eyes to voice   Skin: large sacral decub, poor healing with breakdown  Transplant Surgery - Multidisciplinary Rounds  --------------------------------------------------------------  OLT   11/15/2024         Colectomy 12/7/2024     IABP 12/7 removed 12/10  Tracheostomy 12/17/2024    Present: Patient seen and examined with multidisciplinary transplant team including Surgeon Dr. Hutchinson, Hepatologist Dr. Hall [facility] Pharmacist, and bedside RN during AM rounds. Disciplines not in attendance will be notified of plan.    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   s/p OLT 11/15 with post op course c/b:  Hypoxia: Reintubated 11/20, extubated 11/24->reintubated 11/24, extubated 11/26, reintubated 12/7, trached 12/17  Ileus   A fib  AMS/Seizure   L brachial DVT  Neutropenia  E coli Bacteremia (12/6)  s/p Colectomy 12/7.   IABP 12/7, removed 12/10  Ec Faecalis UTI (12/16)  Stenotrophamonas in trach aspirate (12/19)  Clostridium in blood 12/23  UGIB 12/26  CMV Viremia  MORAIMA requiring CRRT  Shock liver    Interval/Overnight Events:   - afebrile  - correcting plt daily  - Nplate started   - diuresing PRN  - CRRT @-50            Immunosuppression:  - Cyclo per level, MMF HELD, solumed 16  -ongoing monitoring for signs of rejection            MEDICATIONS  (STANDING):  albumin human 25% IVPB 50 milliLiter(s) IV Intermittent every 8 hours  buDESOnide    Inhalation Suspension 0.5 milliGRAM(s) Inhalation every 12 hours  chlorhexidine 0.12% Liquid 15 milliLiter(s) Oral Mucosa every 12 hours  chlorhexidine 2% Cloths 1 Application(s) Topical <User Schedule>  collagenase Ointment 1 Application(s) Topical daily  CRRT Treatment    <Continuous>  epoetin tonja (PROCRIT) Injectable 31932 Unit(s) IV Push every 7 days  ganciclovir IVPB 250 milliGRAM(s) IV Intermittent <User Schedule>  ganciclovir IVPB 125 milliGRAM(s) IV Intermittent <User Schedule>  insulin lispro (ADMELOG) corrective regimen sliding scale   SubCutaneous every 6 hours  insulin NPH human recombinant 6 Unit(s) SubCutaneous every 6 hours  levoFLOXacin IVPB 500 milliGRAM(s) IV Intermittent every 24 hours  melatonin 5 milliGRAM(s) Oral at bedtime  methylPREDNISolone sodium succinate Injectable 16 milliGRAM(s) IV Push daily  metoprolol tartrate Injectable 2.5 milliGRAM(s) IV Push every 6 hours  midodrine 30 milliGRAM(s) Oral every 8 hours  mupirocin 2% Ointment 1 Application(s) Topical every 12 hours  nystatin Powder 1 Application(s) Topical every 12 hours  pantoprazole  Injectable 40 milliGRAM(s) IV Push every 12 hours  phenylephrine    Infusion 4 MICROgram(s)/kG/Min (74.6 mL/Hr) IV Continuous <Continuous>  Phoxillum Filtration BK 4 / 2.5 5000 milliLiter(s) (1500 mL/Hr) CRRT <Continuous>  Phoxillum Filtration BK 4 / 2.5 5000 milliLiter(s) (1200 mL/Hr) CRRT <Continuous>  PrismaSOL Filtration BGK 4 / 2.5 5000 milliLiter(s) (200 mL/Hr) CRRT <Continuous>  QUEtiapine 25 milliGRAM(s) Oral at bedtime  romiPLOStim Injectable 100 MICROGram(s) SubCutaneous every 7 days  sodium chloride 3%  Inhalation 4 milliLiter(s) Inhalation every 12 hours  trimethoprim / sulfamethoxazole IVPB 150 milliGRAM(s) IV Intermittent <User Schedule>  trimethoprim / sulfamethoxazole IVPB 300 milliGRAM(s) IV Intermittent <User Schedule>  vasopressin Infusion 0.03 Unit(s)/Min (4.5 mL/Hr) IV Continuous <Continuous>  voriconazole IVPB 200 milliGRAM(s) IV Intermittent every 12 hours    MEDICATIONS  (PRN):  albuterol/ipratropium for Nebulization 3 milliLiter(s) Nebulizer every 6 hours PRN Shortness of Breath and/or Wheezing  FIRST- Mouthwash  BLM 10 milliLiter(s) Swish and Spit every 8 hours PRN Mouth Care  HYDROmorphone  Injectable 0.25 milliGRAM(s) IV Push every 3 hours PRN Moderate Pain (4 - 6)      PAST MEDICAL & SURGICAL HISTORY:  Diabetes      Transaminitis      Paroxysmal atrial fibrillation      Depression      BPH (benign prostatic hyperplasia)      Hypertension      Chronic atrial fibrillation      Coronary artery disease      Hepatocellular carcinoma      DM (diabetes mellitus)      HTN (hypertension)      Paroxysmal atrial fibrillation      Cirrhosis      HCC (hepatocellular carcinoma)      History of BPH      History of laparoscopic cholecystectomy      History of lumbar laminectomy      H/O prior ablation treatment      H/O percutaneous left heart catheterization          Vital Signs Last 24 Hrs  T(C): 36.5 (19 Jan 2025 11:00), Max: 37.4 (19 Jan 2025 03:00)  T(F): 97.7 (19 Jan 2025 11:00), Max: 99.3 (19 Jan 2025 03:00)  HR: 80 (19 Jan 2025 14:00) (78 - 91)  BP: 118/59 (18 Jan 2025 20:30) (118/59 - 118/59)  BP(mean): 85 (18 Jan 2025 20:30) (85 - 85)  RR: 19 (19 Jan 2025 14:00) (16 - 39)  SpO2: 97% (19 Jan 2025 14:00) (92% - 99%)    Parameters below as of 19 Jan 2025 07:00  Patient On (Oxygen Delivery Method): ventilator    O2 Concentration (%): 40    I&O's Summary    18 Jan 2025 07:01  -  19 Jan 2025 07:00  --------------------------------------------------------  IN: 3434.8 mL / OUT: 2143 mL / NET: 1291.8 mL    19 Jan 2025 07:01  -  19 Jan 2025 14:25  --------------------------------------------------------  IN: 887.1 mL / OUT: 1171 mL / NET: -283.9 mL                              8.7    1.24  )-----------( 11       ( 19 Jan 2025 05:41 )             24.2     01-19    133[L]  |  100  |  26[H]  ----------------------------<  105[H]  4.1   |  19[L]  |  <0.30[L]    Ca    8.3[L]      19 Jan 2025 11:48  Phos  3.6     01-19  Mg     2.3     01-19    TPro  3.8[L]  /  Alb  3.1[L]  /  TBili  28.7[H]  /  DBili  x   /  AST  59[H]  /  ALT  22  /  AlkPhos  163[H]  01-19          Culture - Fungal, Blood (collected 01-17-25 @ 02:36)  Source: .Blood Blood  Preliminary Report (01-18-25 @ 23:03):    Culture is being performed. Fungal cultures are held for 6 weeks.    Culture - Blood (collected 01-13-25 @ 17:30)  Source: .Blood BLOOD  Final Report (01-18-25 @ 22:01):    No growth at 5 days    Culture - Blood (collected 01-13-25 @ 17:13)  Source: .Blood BLOOD  Final Report (01-18-25 @ 22:01):    No growth at 5 days                  ROS: Unable to assess patient is lethargic, s/p tracheostomy    PHYSICAL EXAM:   Constitutional:  awake, semi-responsive  Eyes:  PERRLA, Scleral icterus  ENMT: nc/at, no thrush, NGT  Neck: supple, trach   Respiratory: CTA B/L  Cardiovascular: RRR  Gastrointestinal: incision clean/dry/intact + Ostomy red low/no output, wound vac, peritoneal drains x1 bilious   Genitourinary: +scrotal edema w/ large fluid collection; black/green discoloration  Extremities: SCD's in place and working bilaterally, + UE/LE edema  Neurological: opens eyes to voice   Skin: large sacral decub, poor healing with breakdown

## 2025-01-19 NOTE — PROGRESS NOTE ADULT - SUBJECTIVE AND OBJECTIVE BOX
INTERVAL EVENTS:  - Filgastrin 01/18  - given 2u platelet  - increasing vasopressor requirements, held metoprolol dose x2. Lowered metoprolol to 2.5mg q6 hours  - given 2 bumex with no response  - CXR with more pulmonary vascular congestion, consider 24h CRRT to help with fluid removal    SUBJECTIVE/ROS:  [ ] A ten-point review of systems was otherwise negative except as noted.  [ ] Due to altered mental status/intubation, subjective information were not able to be obtained from the patient. History was obtained, to the extent possible, from review of the chart and collateral sources of information.      NEURO  Exam: grimaces to pain, tracks  Meds: HYDROmorphone  Injectable 0.25 milliGRAM(s) IV Push every 3 hours PRN Moderate Pain (4 - 6)  melatonin 5 milliGRAM(s) Oral at bedtime  QUEtiapine 25 milliGRAM(s) Oral at bedtime  [x] Adequacy of sedation and pain control has been assessed and adjusted      RESPIRATORY  RR: 32 (01-19-25 @ 03:00) (11 - 34)  SpO2: 96% (01-19-25 @ 03:11) (92% - 99%)  Exam: unlabored, clear to auscultation bilaterally  Mechanical Ventilation: Mode: AC/ CMV (Assist Control/ Continuous Mandatory Ventilation), RR (machine): 14, RR (patient): 18, TV (machine): 470, FiO2: 40, PEEP: 5, ITime: 0.9, MAP: 7.7, PIP: 16  ABG - ( 18 Jan 2025 05:42 )  pH: 7.33  /  pCO2: 39    /  pO2: 138   / HCO3: 21    / Base Excess: -4.9  /  SaO2: 99.6        [N/A] Extubation Readiness Assessed  Meds: albuterol/ipratropium for Nebulization 3 milliLiter(s) Nebulizer every 6 hours PRN Shortness of Breath and/or Wheezing  buDESOnide    Inhalation Suspension 0.5 milliGRAM(s) Inhalation every 12 hours  sodium chloride 3%  Inhalation 4 milliLiter(s) Inhalation every 12 hours      CARDIOVASCULAR  HR: 84 (01-19-25 @ 03:11) (80 - 98)  BP: 118/59 (01-18-25 @ 20:30) (118/59 - 118/59)  BP(mean): 85 (01-18-25 @ 20:30) (85 - 85)  ABP: 93/41 (01-19-25 @ 03:00) (88/40 - 112/48)  ABP(mean): 61 (01-19-25 @ 03:00) (57 - 76)  VBG - ( 17 Jan 2025 06:12 )  pH: 7.32  /  pCO2: 38    /  pO2: 117   / HCO3: 20    / Base Excess: -6.0  /  SaO2: 99.0   Lactate: 2.9        Exam: regular rate and rhythm  Cardiac Rhythm: sinus  Perfusion     [x]Adequate   [ ]Inadequate  Mentation   [x]Normal       [ ]Reduced  Extremities  [x]Warm         [ ]Cool  Volume Status [ ]Hypervolemic [x]Euvolemic [ ]Hypovolemic  Meds: metoprolol tartrate Injectable 2.5 milliGRAM(s) IV Push every 6 hours  midodrine 30 milliGRAM(s) Oral every 8 hours  phenylephrine    Infusion 4 MICROgram(s)/kG/Min IV Continuous <Continuous>      GI/NUTRITION  Exam: soft, nontender, nondistended  Diet:  trickle feeds  Meds: pantoprazole  Injectable 40 milliGRAM(s) IV Push every 12 hours      GENITOURINARY  I&O's Detail    01-17 @ 07:01 - 01-18 @ 07:00  --------------------------------------------------------  IN:    Albumin 25%  -  50 mL: 200 mL    Albumin 5%  - 250 mL: 1000 mL    Enteral Tube Flush: 160 mL    IV PiggyBack: 375 mL    IV PiggyBack: 1300 mL    Phenylephrine: 1789.4 mL    Plasma: 300 mL    Platelets - Single Donor: 450 mL    PRBCs (Packed Red Blood Cells): 300 mL    Vasopressin: 108 mL  Total IN: 5982.4 mL    OUT:    Bulb (mL): 1115 mL    Ileostomy (mL): 225 mL    Indwelling Catheter - Urethral (mL): 25 mL    Other (mL): 828 mL    VAC (Vacuum Assisted Closure) System (mL): 100 mL    Vital1.5: 0 mL  Total OUT: 2293 mL    Total NET: 3689.4 mL      01-18 @ 07:01 - 01-19 @ 03:45  --------------------------------------------------------  IN:    Albumin 25%  -  50 mL: 100 mL    Enteral Tube Flush: 50 mL    IV PiggyBack: 125 mL    IV PiggyBack: 825 mL    Phenylephrine: 949.6 mL    Platelets - Single Donor: 450 mL    Vasopressin: 90 mL    Vital1.5: 160 mL  Total IN: 2749.6 mL    OUT:    Bulb (mL): 575 mL    Ileostomy (mL): 25 mL    Indwelling Catheter - Urethral (mL): 10 mL    Other (mL): 741 mL    VAC (Vacuum Assisted Closure) System (mL): 0 mL  Total OUT: 1351 mL    Total NET: 1398.6 mL        01-19    132[L]  |  99  |  32[H]  ----------------------------<  136[H]  4.2   |  18[L]  |  <0.30[L]    Ca    8.4      19 Jan 2025 00:07  Phos  3.7     01-19  Mg     2.3     01-19    TPro  3.9[L]  /  Alb  3.2[L]  /  TBili  28.4[H]  /  DBili  x   /  AST  52[H]  /  ALT  19  /  AlkPhos  156[H]  01-19    [ ] Castro catheter, indication: N/A  Meds: albumin human 25% IVPB 50 milliLiter(s) IV Intermittent every 8 hours      HEMATOLOGIC  Meds: romiPLOStim Injectable 100 MICROGram(s) SubCutaneous every 7 days    [x] VTE Prophylaxis                        8.3    1.32  )-----------( 10       ( 18 Jan 2025 18:07 )             24.0     PT/INR - ( 18 Jan 2025 05:55 )   PT: 25.5 sec;   INR: 2.24 ratio         PTT - ( 18 Jan 2025 05:55 )  PTT:69.9 sec      INFECTIOUS DISEASES  WBC Count: 1.32 K/uL (01-18 @ 18:07)  WBC Count: 1.19 K/uL (01-18 @ 05:55)    RECENT CULTURES:  Specimen Source: .Blood Blood  Date/Time: 01-17 @ 02:36  Culture Results:   Culture is being performed. Fungal cultures are held for 6 weeks.  Gram Stain: --  Organism: --    Meds: epoetin tonja (PROCRIT) Injectable 86129 Unit(s) IV Push every 7 days  ganciclovir IVPB 250 milliGRAM(s) IV Intermittent <User Schedule>  ganciclovir IVPB 125 milliGRAM(s) IV Intermittent <User Schedule>  levoFLOXacin IVPB 500 milliGRAM(s) IV Intermittent every 24 hours  trimethoprim / sulfamethoxazole IVPB 150 milliGRAM(s) IV Intermittent <User Schedule>  trimethoprim / sulfamethoxazole IVPB 300 milliGRAM(s) IV Intermittent <User Schedule>  voriconazole IVPB 200 milliGRAM(s) IV Intermittent every 12 hours        ENDOCRINE  CAPILLARY BLOOD GLUCOSE      POCT Blood Glucose.: 134 mg/dL (18 Jan 2025 23:53)  POCT Blood Glucose.: 125 mg/dL (18 Jan 2025 18:02)  POCT Blood Glucose.: 140 mg/dL (18 Jan 2025 11:38)  POCT Blood Glucose.: 129 mg/dL (18 Jan 2025 05:40)    Meds: insulin lispro (ADMELOG) corrective regimen sliding scale   SubCutaneous every 6 hours  insulin NPH human recombinant 6 Unit(s) SubCutaneous every 6 hours  methylPREDNISolone sodium succinate Injectable 16 milliGRAM(s) IV Push daily  vasopressin Infusion 0.03 Unit(s)/Min IV Continuous <Continuous>        CODE STATUS: full code

## 2025-01-19 NOTE — PROGRESS NOTE ADULT - ASSESSMENT
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 Post op course complicated by acute hypoxic respiratory failure requiring intubation multiple times, most recently on 12/7 with conversion to tracheostomy on 12/17, e.coli bacteremia with RTOR on 12/7 for concern for worsening septic shock and cardiogenic shock s/p balloon pump placement and total abdominal colectomy in setting of ischemic bowel s/p OR on 12/9 for ileostomy creation, and olirugirc MORAIMA requiring CRRT and now intermittent HD.    Clinical course complicated by numerous infectious complications including Stenotrophomonas pneumonia, Enterococcus  faecalis isolated on abdominal drains and Clostridium paraputrificum bacteremia. Also with development of wounds including the sacrum and sloughing of scrotum in setting of loss of blood flow to the testes. Most recently with new cavitary pneumonia with coinciding Stenotrophomonas bacteremia.      BCx (1/11/25) Stenotrophomonas  Trach Culture (1/12/25) Stenotrophomonas  Fungitell 1/13 >500  Aspergillus galactomannan (1/12) 0.02    CT Chest (1/11/25) Cavitary left apical lesions measuring 1.9 cm and 1.5 cm in the region of previously groundglass opacity. Branching nodular opacities in the left upper lobe.     Cavitary pneumonia could be secondary to atypical pathogen although its also possible it is secondary to Stenotrophomonas    #Septic Shock, Cavitary pneumonia, Stenotrophomonas Bacteremia  --Recommend repeat CT Chest to reevaluate cavitary lung lesionssets  --If worsening pressor trend would add Imipenem back to patient's antibiotic regimen  --Continue IV Bactrim and IV Levofloxacin pending repeat CT Chest  --Continue Voriconazole 200 mg IV Q12H  --Follow up on Voriconazole trough    #Pancytopenia  Likely multifactorial etiology  --If CT Chest without worsening cavitation, may be able to switch Bactrim to minocycline and maintain Levofloxacin for double coverage.     #CMV Viremia  CMV resistance testing sent on 1/14  may need to switch to foscarnet for suspected resistance  received a dose of IVIG for hypogammaglobulinemia  --Restarted IV ganciclovir in induction dosing adjusted for CRRT  --Repeat next CMV PCR on 1/20/25    #Positive Blood Culture (12/23 Clostridium paraputrificum)   Clostridium paraputrificum is generally penicillin and metronidazole susceptible  --s/p Metronidazole and Imipenem course    #Liver Transplant Recipient, Prophylactic Antibiotic  --On ganciclovir already as above  --On bactrim already as above    prognosis remains very poor with multiple infections despite minimal immunosuppressive medications     I will continue to follow. Please feel free to contact me with any further questions.    Kyle Junior M.D.  [facility] Division of Infectious Disease  8AM-5PM Monday - Friday: Available on [employer] Teams  After Hours and Holidays (or if no response on [employer] Teams): Please contact the Infectious Diseases Office at [phone]    The above assessment and plan were discussed with SICU Team

## 2025-01-19 NOTE — PROGRESS NOTE ADULT - ASSESSMENT
74 male w/ a PMHx of HTN, DM, AF, BPH, MASH cirrhosis and HCC s/p OLT on 11/15. Case was uneventful but post-op course has been prolonged and c/b delirium, aspiration, multiple episodes of acute hypoxic respiratory failure requiring reintubation from 11/20-11/24 & 11/24-11/26, AF w/ RVR, ileus requiring NGT, distended colon requiring rectal tube, dysphagia, malnutrition, hypernatremia, fevers, pancytopenia, poorly controlled glucoses, and left brachial VTE. Patient went into severe refractory septic shock on 12/6 w/ blood cultures positive for E. coli s/p s/p ex lap, total abd colectomy, end ileostomy, 3 intentionally retained laparotomy pads for bleeding, Abthera, IABP placement w/ admission to CTICU, Patient required inotropes, Jacqui, and CRRT post-op. s/p closure 12/9. IABP removed 12/10 and transferred back to SICU 12/13. SICU course c/b narrow complex arrythmia w/ ECG showing new ST elevations concerning for posterior wall MI vs vasospasms, cards consulted and started on heparin gtt for empiric ACS tx which was c/b GIB then transitioned to argatroban c/b bleed. TTE revealed LVOT obstruction & TODD.     Neuro:  - Pain: PRN dilaudid  - seroquel and melatonin at bedtime  - Opens eyes intermittently, intermittently following commands, off sedation (more awake 1/13)  - CT head 11/19, 11/21, 11/25, 11/29, 12/5, 12/20, 1/11 negative  - EEG: Mild diffuse cerebral dysfunction that is not specific in etiology. No epileptic discharges recorded. No seizures recorded.  - Holding home xanax, Abilify, Zoloft, Remeron, Lexapro  - No need for MRI    Resp:  - S/p bedside tracheostomy 12/17  - full support iso pulmonary vascular congestion on CXR, unable to tolerate trach collar 1/18.  - c/w chest PT  - c/w inhaled bronchodilator  - c/w suctioning PRN    CV:  - on kassandra, vaso  - midodrine 30mg q8h  - IV Metoprolol 2.5mg q6hr  - IABP removed 12/10  - TTE 12/6 w/ LVOT obstruction & TODD  - TTE 12/11 shows EF of 55-60%    GI:  - trend LFTs  - Tube feeds at 10cc/hr   - protonix BID  - monitor ALYSSA output  - monitor ileostomy output  - CT 1/11: no obstruction, enteritis, foci of air concerning for early SBO  - holding cyclosporine    /Renal:  - nocturnal CRRT 6p-6a net even, consider 24h CRRT  - 50cc 25% albumin q6 for 24 hrs  - Monitor BUN/Cr, I&Os, trend UOP  - Monitor scrotal necrosis, maintain nichols at all times  - Bladder scan q12    Heme:  - trend H/H, PLTs, coags; Plt goal 20   - hep gtt and argatroban gtt held i/s/o bleed  - PLEX x4, last session 1/9    ID:  - ABX: Voriconazole, imipenem, levaquin, bactrim   - Ganciclovir for CMV   - holding immunosuppresion 2/2 cavitary lesion on CT chest  - Tracheal aspirate -> Stenotrophomas maltophilia  - BCx 12/28 positive for clostridium paraputrificum  - 1/11 L apical cavitary lung lesions c/f aspergillus  - BCx 1/13 positive for stenotrophomonoas maltophilia   - Mouthwash for mouth ulcers  - C diff and GI stool PCR negative  - fungal bcx sent    Endo:  - monitor glucose   - NPH 6u q6h, ISS q6h  - methylprednisolone 16 qd    Lines:   - NGT   - ALYSSA  - RI trialysis

## 2025-01-19 NOTE — PROGRESS NOTE ADULT - NS ATTEND OPT1A GEN_ALL_CORE
History/Exam/Medical decision making

## 2025-01-19 NOTE — PROGRESS NOTE ADULT - NS ATTEND AMEND GEN_ALL_CORE FT
s/p OLT 11/15 with post op course c/b:  Hypoxia: Reintubated 11/20, extubated 11/24->reintubated 11/24, extubated 11/26, reintubated 12/7, trached 12/17  Ileus   A fib  AMS/Seizure   L brachial DVT  Neutropenia  E coli Bacteremia (12/6)  s/p Colectomy 12/7.   IABP 12/7, removed 12/10  Ec Faecalis UTI (12/16)  Stenotrophomonas in trach aspirate (12/19)  Clostridium in blood 12/23  UGIB 12/26  CMV Viremia  MORAIMA requiring CRRT  Shock liver    Arousable, remains off sedatives   CXR worsening  b/l effusion, hold off weaning trial today  Remains on high dose of Aldo, Vaso,   Increase tube feed at 20 cc as tolerated  On CRRT with neg fluid balance as tolerated, did not respond to  Bumex 2 mg IV  Abx Ganciclovir, Imipenem, Voriconazole, Bactrim, Levaquin   Filgrastim for refractory neutropenia, Weekly Nplate for thrombocytopenia without effect, pt is pancytopenic requiring transfusion almost on daily basis   Not stable for plasmapheresis    Prognosis remains very poor  Family kept updated

## 2025-01-20 NOTE — PROGRESS NOTE ADULT - SUBJECTIVE AND OBJECTIVE BOX
Follow Up:      Interval History:    REVIEW OF SYSTEMS  [  ] ROS unobtainable because:    [  ] All other systems negative except as noted below    Constitutional:  [ ] fever [ ] chills  [ ] weight loss  [ ] weakness  Skin:  [ ] rash [ ] phlebitis	  Eyes: [ ] icterus [ ] pain  [ ] discharge	  ENMT: [ ] sore throat  [ ] thrush [ ] ulcers [ ] exudates  Respiratory: [ ] dyspnea [ ] hemoptysis [ ] cough [ ] sputum	  Cardiovascular:  [ ] chest pain [ ] palpitations [ ] edema	  Gastrointestinal:  [ ] nausea [ ] vomiting [ ] diarrhea [ ] constipation [ ] pain	  Genitourinary:  [ ] dysuria [ ] frequency [ ] hematuria [ ] discharge [ ] flank pain  [ ] incontinence  Musculoskeletal:  [ ] myalgias [ ] arthralgias [ ] arthritis  [ ] back pain  Neurological:  [ ] headache [ ] seizures  [ ] confusion/altered mental status    Allergies  No Known Allergies        ANTIMICROBIALS:  ganciclovir IVPB 250 every 24 hours  levoFLOXacin IVPB 500 every 24 hours  minocycline IVPB 200 every 12 hours  trimethoprim / sulfamethoxazole IVPB 300 every 8 hours  voriconazole IVPB 200 every 12 hours      OTHER MEDS:  MEDICATIONS  (STANDING):  albuterol/ipratropium for Nebulization 3 every 6 hours PRN  buDESOnide    Inhalation Suspension 0.5 every 12 hours  epoetin tonja (PROCRIT) Injectable 90898 every 7 days  filgrastim-sndz (ZARXIO) Injectable 480 every 24 hours  HYDROmorphone  Injectable 0.25 every 3 hours PRN  insulin lispro (ADMELOG) corrective regimen sliding scale  every 6 hours  insulin NPH human recombinant 6 every 6 hours  melatonin 5 at bedtime  methylPREDNISolone sodium succinate Injectable 16 daily  metoprolol tartrate Injectable 2.5 every 6 hours  midodrine 30 every 8 hours  pantoprazole  Injectable 40 every 12 hours  phenylephrine    Infusion 4 <Continuous>  QUEtiapine 25 at bedtime  romiPLOStim Injectable 100 every 7 days  sodium chloride 3%  Inhalation 4 every 12 hours  vasopressin Infusion 0.03 <Continuous>      Vital Signs Last 24 Hrs  T(C): 37.1 (20 Jan 2025 15:00), Max: 37.5 (19 Jan 2025 23:00)  T(F): 98.8 (20 Jan 2025 15:00), Max: 99.5 (19 Jan 2025 23:00)  HR: 85 (20 Jan 2025 16:15) (75 - 109)  BP: 144/78 (19 Jan 2025 19:45) (144/78 - 144/78)  BP(mean): 101 (19 Jan 2025 19:45) (101 - 101)  RR: 22 (20 Jan 2025 16:15) (13 - 33)  SpO2: 97% (20 Jan 2025 16:15) (87% - 98%)    Parameters below as of 20 Jan 2025 15:00  Patient On (Oxygen Delivery Method): ventilator    O2 Concentration (%): 40    PHYSICAL EXAMINATION:  General: Alert and Awake, NAD  HEENT: PERRL, EOMI  Neck: Supple  Cardiac: RRR, No M/R/G  Resp: CTAB, No Wh/Rh/Ra  Abdomen: NBS, NT/ND, No HSM, No rigidity or guarding  MSK: No LE edema. No Calf tenderness  : No nichols  Skin: No rashes or lesions. Skin is warm and dry to the touch.   Neuro: Alert and Awake. CN 2-12 Grossly intact. Moves all four extremities spontaneously.  Psych: Calm, Pleasant, Cooperative                          9.5    0.65  )-----------( 7        ( 20 Jan 2025 05:52 )             26.4       01-20    135  |  103  |  19  ----------------------------<  103[H]  4.2   |  19[L]  |  <0.30[L]    Ca    8.3[L]      20 Jan 2025 12:14  Phos  3.6     01-20  Mg     2.3     01-20    TPro  3.8[L]  /  Alb  3.1[L]  /  TBili  28.9[H]  /  DBili  x   /  AST  60[H]  /  ALT  24  /  AlkPhos  169[H]  01-20      Urinalysis Basic - ( 20 Jan 2025 12:14 )    Color: x / Appearance: x / SG: x / pH: x  Gluc: 103 mg/dL / Ketone: x  / Bili: x / Urobili: x   Blood: x / Protein: x / Nitrite: x   Leuk Esterase: x / RBC: x / WBC x   Sq Epi: x / Non Sq Epi: x / Bacteria: x        MICROBIOLOGY:  v  Combi-Cath  01-19-25   Commensal charity consistent with body site  --    No polymorphonuclear leukocytes per low power field  No Squamous epithelial cells per low power field  Rare Yeast like cells per oil power field      .Blood BLOOD  01-18-25   Growth in aerobic bottle: Gram Negative Rods  --    Growth in aerobic bottle: Gram Negative Rods      .Blood BLOOD  01-18-25   Growth in aerobic bottle: Gram Negative Rods  Direct identification is available within approximately 3-5  hours either by Blood Panel Multiplexed PCR or Direct  MALDI-TOF. Details: [email or web address]  --  Blood Culture PCR      .Blood Blood  01-17-25   Growth in Myco/F Lytic Bottle: Gram Negative Rods  --    Growth in Myco/F Lytic Bottle: Gram Negative Rods      .Blood BLOOD  01-13-25   No growth at 5 days  --  --      .Blood BLOOD  01-13-25   No growth at 5 days  --  --      Trach Asp Tracheal Aspirate  01-12-25   Mixed gram negative rods including  Moderate Stenotrophomonas maltophilia  Commensal charity consistent with body site  --  Stenotrophomonas maltophilia      .Blood BLOOD  01-11-25   No growth at 5 days  --  --      .Blood BLOOD  01-11-25   Growth in aerobic bottle: Stenotrophomonas maltophilia  Direct identification is available within approximately 3-5  hours either by Blood Panel Multiplexed PCR or Direct  MALDI-TOF. Details: [email or web address]  --  Blood Culture PCR  Stenotrophomonas maltophilia      .Blood BLOOD  01-05-25   No growth at 5 days  --  --      .Blood BLOOD  01-05-25   No growth at 5 days  --  --      .Blood BLOOD  01-02-25   No growth at 5 days  --  --      .Blood BLOOD  01-01-25   No growth at 5 days  --  --      .Blood BLOOD  12-28-24   Growth in anaerobic bottle: Clostridium paraputrificum "Susceptibilities  not performed"  Direct identification is available within approximately 3-5  hours either by Blood Panel Multiplexed PCR or Direct  MALDI-TOF. Details: [email or web address]  --  Blood Culture PCR      .Blood BLOOD  12-28-24   No growth at 5 days  --  --      Body Fluid  12-26-24   Rare Enterococcus faecalis  --  Enterococcus faecalis      Body Fluid  12-26-24   Rare Enterococcus faecalis  --  Enterococcus faecalis      .Blood BLOOD  12-24-24   No growth at 5 days  --  --      Combi-Cath  12-23-24   Commensal charity consistent with body site  --    No polymorphonuclear cells seen per low power field  No squamous epithelial cells per low power field  No organisms seen per oil power field      .Blood BLOOD  12-23-24   Growth in anaerobic bottle: Clostridium paraputrificum "Susceptibilities  not performed"  Direct identification is available within approximately 3-5  hours either by Blood Panel Multiplexed PCR or Direct  MALDI-TOF. Details: [email or web address]  --  Blood Culture PCR        HIV-1 RNA Quantitative, Viral Load Log: NOT DET. lg /mL (12-18-24 @ 22:14)  Toxoplasma IgG Screen: 78.00 IU/mL (11-15-24 @ 00:41)  CMV IgG Antibody: 1.50 U/mL (11-15-24 @ 00:41)          RADIOLOGY:    <The imaging below has been reviewed and visualized by me independently. Findings as detailed in report below> Follow Up:  shock    Interval History: blood cultures from 1/17 and 1/18 resulted with Stenotrophomonas remains on pressors.     REVIEW OF SYSTEMS  [ x ] ROS unobtainable because:  encephalopathic  [  ] All other systems negative except as noted below    Constitutional:  [ ] fever [ ] chills  [ ] weight loss  [ ] weakness  Skin:  [ ] rash [ ] phlebitis	  Eyes: [ ] icterus [ ] pain  [ ] discharge	  ENMT: [ ] sore throat  [ ] thrush [ ] ulcers [ ] exudates  Respiratory: [ ] dyspnea [ ] hemoptysis [ ] cough [ ] sputum	  Cardiovascular:  [ ] chest pain [ ] palpitations [ ] edema	  Gastrointestinal:  [ ] nausea [ ] vomiting [ ] diarrhea [ ] constipation [ ] pain	  Genitourinary:  [ ] dysuria [ ] frequency [ ] hematuria [ ] discharge [ ] flank pain  [ ] incontinence  Musculoskeletal:  [ ] myalgias [ ] arthralgias [ ] arthritis  [ ] back pain  Neurological:  [ ] headache [ ] seizures  [ ] confusion/altered mental status    Allergies  No Known Allergies        ANTIMICROBIALS:  ganciclovir IVPB 250 every 24 hours  levoFLOXacin IVPB 500 every 24 hours  minocycline IVPB 200 every 12 hours  trimethoprim / sulfamethoxazole IVPB 300 every 8 hours  voriconazole IVPB 200 every 12 hours      OTHER MEDS:  MEDICATIONS  (STANDING):  albuterol/ipratropium for Nebulization 3 every 6 hours PRN  buDESOnide    Inhalation Suspension 0.5 every 12 hours  epoetin tonja (PROCRIT) Injectable 78605 every 7 days  filgrastim-sndz (ZARXIO) Injectable 480 every 24 hours  HYDROmorphone  Injectable 0.25 every 3 hours PRN  insulin lispro (ADMELOG) corrective regimen sliding scale  every 6 hours  insulin NPH human recombinant 6 every 6 hours  melatonin 5 at bedtime  methylPREDNISolone sodium succinate Injectable 16 daily  metoprolol tartrate Injectable 2.5 every 6 hours  midodrine 30 every 8 hours  pantoprazole  Injectable 40 every 12 hours  phenylephrine    Infusion 4 <Continuous>  QUEtiapine 25 at bedtime  romiPLOStim Injectable 100 every 7 days  sodium chloride 3%  Inhalation 4 every 12 hours  vasopressin Infusion 0.03 <Continuous>      Vital Signs Last 24 Hrs  T(C): 37.1 (20 Jan 2025 15:00), Max: 37.5 (19 Jan 2025 23:00)  T(F): 98.8 (20 Jan 2025 15:00), Max: 99.5 (19 Jan 2025 23:00)  HR: 85 (20 Jan 2025 16:15) (75 - 109)  BP: 144/78 (19 Jan 2025 19:45) (144/78 - 144/78)  BP(mean): 101 (19 Jan 2025 19:45) (101 - 101)  RR: 22 (20 Jan 2025 16:15) (13 - 33)  SpO2: 97% (20 Jan 2025 16:15) (87% - 98%)    Parameters below as of 20 Jan 2025 15:00  Patient On (Oxygen Delivery Method): ventilator    O2 Concentration (%): 40    PHYSICAL EXAMINATION:  General: awake but not alert, NAD  Neck: +Trach  Cardiac: RRR, No M/R/G  Resp: CTAB, No Wh/Rh/Ra  Abdomen: +wound vac over abdomen No HSM, No rigidity or guarding  MSK: No LE edema. No Calf tenderness  : Dressing over testes  Skin: No rashes or lesions. Skin is warm and dry to the touch.   Neuro: awake but not alert. CN 2-12 Grossly intact. Moves all four extremities spontaneously.  Psych: Encephalopathic - unable to assess                            9.5    0.65  )-----------( 7        ( 20 Jan 2025 05:52 )             26.4       01-20    135  |  103  |  19  ----------------------------<  103[H]  4.2   |  19[L]  |  <0.30[L]    Ca    8.3[L]      20 Jan 2025 12:14  Phos  3.6     01-20  Mg     2.3     01-20    TPro  3.8[L]  /  Alb  3.1[L]  /  TBili  28.9[H]  /  DBili  x   /  AST  60[H]  /  ALT  24  /  AlkPhos  169[H]  01-20      Urinalysis Basic - ( 20 Jan 2025 12:14 )    Color: x / Appearance: x / SG: x / pH: x  Gluc: 103 mg/dL / Ketone: x  / Bili: x / Urobili: x   Blood: x / Protein: x / Nitrite: x   Leuk Esterase: x / RBC: x / WBC x   Sq Epi: x / Non Sq Epi: x / Bacteria: x        MICROBIOLOGY:  v  Combi-Cath  01-19-25   Commensal charity consistent with body site  --    No polymorphonuclear leukocytes per low power field  No Squamous epithelial cells per low power field  Rare Yeast like cells per oil power field      .Blood BLOOD  01-18-25   Growth in aerobic bottle: Gram Negative Rods  --    Growth in aerobic bottle: Gram Negative Rods      .Blood BLOOD  01-18-25   Growth in aerobic bottle: Gram Negative Rods  Direct identification is available within approximately 3-5  hours either by Blood Panel Multiplexed PCR or Direct  MALDI-TOF. Details: [email or web address]  --  Blood Culture PCR      .Blood Blood  01-17-25   Growth in Myco/F Lytic Bottle: Gram Negative Rods  --    Growth in Myco/F Lytic Bottle: Gram Negative Rods      .Blood BLOOD  01-13-25   No growth at 5 days  --  --      .Blood BLOOD  01-13-25   No growth at 5 days  --  --      Trach Asp Tracheal Aspirate  01-12-25   Mixed gram negative rods including  Moderate Stenotrophomonas maltophilia  Commensal charity consistent with body site  --  Stenotrophomonas maltophilia      .Blood BLOOD  01-11-25   No growth at 5 days  --  --      .Blood BLOOD  01-11-25   Growth in aerobic bottle: Stenotrophomonas maltophilia  Direct identification is available within approximately 3-5  hours either by Blood Panel Multiplexed PCR or Direct  MALDI-TOF. Details: [email or web address]  --  Blood Culture PCR  Stenotrophomonas maltophilia      .Blood BLOOD  01-05-25   No growth at 5 days  --  --      .Blood BLOOD  01-05-25   No growth at 5 days  --  --      .Blood BLOOD  01-02-25   No growth at 5 days  --  --      .Blood BLOOD  01-01-25   No growth at 5 days  --  --      .Blood BLOOD  12-28-24   Growth in anaerobic bottle: Clostridium paraputrificum "Susceptibilities  not performed"  Direct identification is available within approximately 3-5  hours either by Blood Panel Multiplexed PCR or Direct  MALDI-TOF. Details: [email or web address]  --  Blood Culture PCR      .Blood BLOOD  12-28-24   No growth at 5 days  --  --      Body Fluid  12-26-24   Rare Enterococcus faecalis  --  Enterococcus faecalis      Body Fluid  12-26-24   Rare Enterococcus faecalis  --  Enterococcus faecalis      .Blood BLOOD  12-24-24   No growth at 5 days  --  --      Combi-Cath  12-23-24   Commensal charity consistent with body site  --    No polymorphonuclear cells seen per low power field  No squamous epithelial cells per low power field  No organisms seen per oil power field      .Blood BLOOD  12-23-24   Growth in anaerobic bottle: Clostridium paraputrificum "Susceptibilities  not performed"  Direct identification is available within approximately 3-5  hours either by Blood Panel Multiplexed PCR or Direct  MALDI-TOF. Details: [email or web address]  --  Blood Culture PCR        HIV-1 RNA Quantitative, Viral Load Log: NOT DET. lg /mL (12-18-24 @ 22:14)  Toxoplasma IgG Screen: 78.00 IU/mL (11-15-24 @ 00:41)  CMV IgG Antibody: 1.50 U/mL (11-15-24 @ 00:41)          RADIOLOGY:    <The imaging below has been reviewed and visualized by me independently. Findings as detailed in report below>    < from: Xray Chest 1 View- PORTABLE-Routine (Xray Chest 1 View- PORTABLE-Routine in AM.) (01.20.25 @ 07:17) >  IMPRESSION:  Right lung clearing. Smaller left effusion.    < end of copied text >

## 2025-01-20 NOTE — PROGRESS NOTE ADULT - ASSESSMENT
Problem: NORMAL   Goal: Experiences normal transition  Description  INTERVENTIONS:  - Assess and monitor vital signs and lab values.   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, cultures have since cleared  - Tx ID following - on Imipenem/Levo/Bactrim DS/Voriconazole  - 1/1 CT CAP: small pleural effusions, enteritis, rest ok  - all lines changed over 1/3  - Wound care for sacral decubitus ulcer  - CT chest/abd/pelvis 1/11 showed apical cavitating lesions likely fungal ID switched Caspo to Voriconazole  - 1/13 Blood Cx + Stenotrophomonas  - neupogen PRN for low WBC     [] MORAIMA:   - CRRT started 12/7, stopped 12/15, resumed CRRT 12/23, now on nocturnal CRRT negative 50cc/hr  - still anuric     [ ] UGIB s/p EGD (12/26) showing generalized oozing, coagulopathy  - Correct coagulopathy per SICU  - Protonix  - TF at goal  -nplate started q7days for thrombocytopenia    [] s/p OLT  - poor graft function, trial of plasmapheresis 1/3, 1/5, 1/7 for (3-5 days), PLEX #4 1/9,   - IVIG #1 on 1/8, #2 1/9   - PLEX and IVIG held 1/11 +1/12  - repeat liver US unchanged  - Diet: restart trickle feeds  - Pain management: avoid narcotics  - Strict I&Os, wound vac placed 12/10   - US: L brachial DVT (holding A/C)  - wound vac care  - ursodiol 300mgBID     [ ] Immunosuppression  - Tacrolimus stopped 11/18 in setting of AMS/Seizure, Started Cyclo 12/17, now held for worsening functional status   - Cyclo held, MMF HELD, Solumedrol 16 mg daily    [] lleus   -@ home on Linzess  - imaging with distended colon, improving, (likely opioid induced)  - s/p Neostigmine x 2  - s/p Relistor, last dose 12/1  - s/p colectomy with end ileostomy  (see above)  - low ileostomy output post lomotil, restarted trickle feeds  - Colorectal following  - replace losses as able  - Ileostomy output increasing (now 75cc/24 from 30), melanotic stool    [] CMV viremia  - CMV PCR (12/11 and 12/16): neg, positive CMV PCR on 12/23  - CMV viral load incr from 537 to 1240 1/6, at 1280 on 1/13, ganciclovir    [ ] AMS  - Reintubated 11/20 Aspiration/hypoxia, extubated 11/24->[id number]/24--> extubated 11/26 -> [id number]/7 -> tracheostomy 12/17 -> trach collar trials starting 12/29  - EEG 11/30 negative, Neurology following  - BH following   - off tacro  -CT Head No Cont (12/20): Age-indeterminate posterior left frontal lobe infarct.   -CT Head (12/25): Evolving subacute infarct in the left supramarginal gyrus  -repeat CTH ok, poor MS   - melatonin QHS     [ ] HTN/ pAFib  [ ] coronary vasospasm vs posterior wall MI  - remains off all a/c, failed hep gtt/argatroban due to UGIB  - Cards- plan for PCI prior to DC   - Off Amiodarone, off dobutamine  - BB as needed  - Dr. 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, cultures have since cleared  - Tx ID following - on Imipenem/Levo/Bactrim DS/Voriconazole/Nitin  - 1/1 CT CAP: small pleural effusions, enteritis, rest ok  - all lines changed over 1/3  - Wound care for sacral decubitus ulcer  - CT chest/abd/pelvis 1/11 showed apical cavitating lesions likely fungal ID switched Caspo to Voriconazole  - 1/13 Blood Cx + Stenotrophomonas, 1/18 Cx - growing gram - rods  - neupogen PRN for low WBC     [] MORAIMA:   - CRRT started 12/7, stopped 12/15, resumed CRRT 12/23, now on nocturnal CRRT negative 50cc/hr  - still anuric     [ ] UGIB s/p EGD (12/26) showing generalized oozing, coagulopathy  - Correct coagulopathy per SICU  - Protonix  - TF at goal  -nplate started q7days for thrombocytopenia    [] s/p OLT  - poor graft function, trial of plasmapheresis 1/3, 1/5, 1/7 for (3-5 days), PLEX #4 1/9,   - IVIG #1 on 1/8, #2 1/9   - PLEX and IVIG held 1/11 +1/12  - repeat liver US unchanged  - Diet: restart trickle feeds  - Pain management: avoid narcotics  - Strict I&Os, wound vac placed 12/10   - US: L brachial DVT (holding A/C)  - wound vac care  - ursodiol 300mgBID     [ ] Immunosuppression  - Tacrolimus stopped 11/18 in setting of AMS/Seizure, Started Cyclo 12/17, now held for worsening functional status   - Cyclo held, MMF HELD, Solumedrol 16 mg daily    [] lleus   -@ home on Linzess  - imaging with distended colon, improving, (likely opioid induced)  - s/p Neostigmine x 2  - s/p Relistor, last dose 12/1  - s/p colectomy with end ileostomy  (see above)  - low ileostomy output post lomotil, restarted trickle feeds  - Colorectal following  - replace losses as able  - Ileostomy output increasing (now 75cc/24 from 30), melanotic stool    [] CMV viremia  - CMV PCR (12/11 and 12/16): neg, positive CMV PCR on 12/23  - CMV viral load incr from 537 to 1240 1/6, at 1280 on 1/13, ganciclovir    [ ] AMS  - Reintubated 11/20 Aspiration/hypoxia, extubated 11/24->ctsdjniatay74/24--> extubated 11/26 -> frupiyypems85/7 -> tracheostomy 12/17 -> trach collar trials starting 12/29  - EEG 11/30 negative, Neurology following  - BH following   - off tacro  -CT Head No Cont (12/20): Age-indeterminate posterior left frontal lobe infarct.   -CT Head (12/25): Evolving subacute infarct in the left supramarginal gyrus  -repeat CTH ok, poor MS   - melatonin QHS     [ ] HTN/ pAFib  [ ] coronary vasospasm vs posterior wall MI  - remains off all a/c, failed hep gtt/argatroban due to UGIB  - Cards- plan for PCI prior to DC   - Off Amiodarone, off dobutamine  - BB as needed  - Dr. Quintanilla following    [ ] DM  - per SICU     [] Scrotal abscess   - US (12/12): 2.1 x0.9 x 3.2 cm focal, right testicle- possible abscess (12/12)  - Urology recs: CT scrotum review no debridement required  - Urology recs Derm consult for guidance on wound care   - 12/23 US doppler scrotum: no arterial flow, limited venous flow, bl hydrocele  - 12/15 CT CAP no acute changes   - Elevate scrotum w/ support Urology consult appreciated

## 2025-01-20 NOTE — PROGRESS NOTE ADULT - ASSESSMENT
74 male w/ a PMHx of HTN, DM, AF, BPH, MASH cirrhosis and HCC s/p OLT on 11/15. Case was uneventful but post-op course has been prolonged and c/b delirium, aspiration, multiple episodes of acute hypoxic respiratory failure requiring reintubation from 11/20-11/24 & 11/24-11/26, AF w/ RVR, ileus requiring NGT, distended colon requiring rectal tube, dysphagia, malnutrition, hypernatremia, fevers, pancytopenia, poorly controlled glucoses, and left brachial VTE. Patient went into severe refractory septic shock on 12/6 w/ blood cultures positive for E. coli s/p s/p ex lap, total abd colectomy, end ileostomy, 3 intentionally retained laparotomy pads for bleeding, Abthera, IABP placement w/ admission to CTICU, Patient required inotropes, Jacqui, and CRRT post-op. s/p closure 12/9. IABP removed 12/10 and transferred back to SICU 12/13. SICU course c/b narrow complex arrythmia w/ ECG showing new ST elevations concerning for posterior wall MI vs vasospasms, cards consulted and started on heparin gtt for empiric ACS tx which was c/b GIB then transitioned to argatroban c/b bleed. TTE revealed LVOT obstruction & TODD.       Neuro:  - Pain: PRN dilaudid  - seroquel and melatonin at bedtime  - Opens eyes intermittently, intermittently following commands, off sedation (more awake 1/13)  - CT head 11/19, 11/21, 11/25, 11/29, 12/5, 12/20, 1/11 negative  - EEG: Mild diffuse cerebral dysfunction that is not specific in etiology. No epileptic discharges recorded. No seizures recorded.  - Holding home xanax, Abilify, Zoloft, Remeron, Lexapro  - No need for MRI    Resp:  - S/p bedside tracheostomy 12/17  - full support iso pulmonary vascular congestion on CXR, unable to tolerate trach collar 1/18.  - c/w chest PT  - c/w inhaled bronchodilator  - c/w suctioning PRN    CV:  - on kassandra, vaso  - midodrine 30mg q8h  - IV Metoprolol 2.5mg q6hr  - IABP removed 12/10  - TTE 12/6 w/ LVOT obstruction & TODD  - TTE 12/11 shows EF of 55-60%    GI:  - trend LFTs  - Tube feeds at 10cc/hr   - protonix BID  - monitor ALYSSA output  - monitor ileostomy output  - CT 1/11: no obstruction, enteritis, foci of air concerning for early SBO  - holding cyclosporine    /Renal:  - nocturnal CRRT 6p-6a net even, consider 24h CRRT  - 50cc 25% albumin q6 for 24 hrs  - Monitor BUN/Cr, I&Os, trend UOP  - Monitor scrotal necrosis, maintain nichols at all times  - Bladder scan q12    Heme:  - Filgastrim QD  - trend H/H, PLTs, coags; Plt goal 20   - hep gtt and argatroban gtt held i/s/o bleed  - PLEX x4, last session 1/9    ID:  - ABX: Voriconazole, imipenem, levaquin, bactrim   - Ganciclovir for CMV   - holding immunosuppresion 2/2 cavitary lesion on CT chest  - 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

## 2025-01-20 NOTE — PROGRESS NOTE ADULT - SUBJECTIVE AND OBJECTIVE BOX
Transplant Surgery - Multidisciplinary Rounds  --------------------------------------------------------------  OLT   11/15/2024         Colectomy 12/7/2024     IABP 12/7 removed 12/10  Tracheostomy 12/17/2024    Present: Patient seen and examined with multidisciplinary transplant team including Surgeon Dr. Hutchinson, Hepatologist Dr. Hall [facility] Ron/ Kem Pharmacist, and bedside RN during AM rounds. Disciplines not in attendance will be notified of plan.    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   s/p OLT 11/15 with post op course c/b:  Hypoxia: Reintubated 11/20, extubated 11/24->reintubated 11/24, extubated 11/26, reintubated 12/7, trached 12/17  Ileus   A fib  AMS/Seizure   L brachial DVT  Neutropenia  E coli Bacteremia (12/6)  s/p Colectomy 12/7.   IABP 12/7, removed 12/10  Ec Faecalis UTI (12/16)  Stenotrophamonas in trach aspirate (12/19)  Clostridium in blood 12/23  UGIB 12/26  CMV Viremia  MORAIMA requiring CRRT  Shock liver    Interval/Overnight Events:                 Immunosuppression:  - Cyclo per level, MMF HELD, solumed 16  -ongoing monitoring for signs of rejection                ROS: Unable to assess patient is lethargic, s/p tracheostomy    PHYSICAL EXAM:   Constitutional:  awake, semi-responsive  Eyes:  PERRLA, Scleral icterus  ENMT: nc/at, no thrush, NGT  Neck: supple, trach   Respiratory: CTA B/L  Cardiovascular: RRR  Gastrointestinal: incision clean/dry/intact + Ostomy red low/no output, wound vac, peritoneal drains x1 bilious   Genitourinary: +scrotal edema w/ large fluid collection; black/green discoloration  Extremities: SCD's in place and working bilaterally, + UE/LE edema  Neurological: opens eyes to voice   Skin: large sacral decub, poor healing with breakdown  Transplant Surgery - Multidisciplinary Rounds  --------------------------------------------------------------  OLT   11/15/2024         Colectomy 12/7/2024     IABP 12/7 removed 12/10  Tracheostomy 12/17/2024    Present: Patient seen and examined with multidisciplinary transplant team including Surgeon Dr. Hutchinson, Hepatologist Dr. Hall [facility] Ron/ Kem/Ale, and bedside RN during AM rounds. Disciplines not in attendance will be notified of plan.    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   s/p OLT 11/15 with post op course c/b:  Hypoxia: Reintubated 11/20, extubated 11/24->reintubated 11/24, extubated 11/26, reintubated 12/7, trached 12/17  Ileus   A fib  AMS/Seizure   L brachial DVT  Neutropenia  E coli Bacteremia (12/6)  s/p Colectomy 12/7.   IABP 12/7, removed 12/10  Ec Faecalis UTI (12/16)  Stenotrophamonas in trach aspirate (12/19)  Clostridium in blood 12/23  UGIB 12/26  CMV Viremia  MORAIMA requiring CRRT  Shock liver    Interval/Overnight Events:   - Vasopressors  - CRRT net even  - Tracheostomy to collar or vent  - WBC 0.65, Pt on procrit  - 2uPlatelets, 2uPRBC       Immunosuppression:  - Cyclo per level, MMF HELD, solumed 16  -ongoing monitoring for signs of rejection        MEDICATIONS  (STANDING):  buDESOnide    Inhalation Suspension 0.5 milliGRAM(s) Inhalation every 12 hours  chlorhexidine 0.12% Liquid 15 milliLiter(s) Oral Mucosa every 12 hours  chlorhexidine 2% Cloths 1 Application(s) Topical <User Schedule>  collagenase Ointment 1 Application(s) Topical daily  CRRT Treatment    <Continuous>  epoetin tonja (PROCRIT) Injectable 14607 Unit(s) IV Push every 7 days  filgrastim-sndz (ZARXIO) Injectable 480 MICROGram(s) SubCutaneous every 24 hours  ganciclovir IVPB 250 milliGRAM(s) IV Intermittent <User Schedule>  ganciclovir IVPB 125 milliGRAM(s) IV Intermittent <User Schedule>  insulin lispro (ADMELOG) corrective regimen sliding scale   SubCutaneous every 6 hours  insulin NPH human recombinant 6 Unit(s) SubCutaneous every 6 hours  levoFLOXacin IVPB 500 milliGRAM(s) IV Intermittent every 24 hours  melatonin 5 milliGRAM(s) Oral at bedtime  methylPREDNISolone sodium succinate Injectable 16 milliGRAM(s) IV Push daily  metoprolol tartrate Injectable 2.5 milliGRAM(s) IV Push every 6 hours  midodrine 30 milliGRAM(s) Oral every 8 hours  mupirocin 2% Ointment 1 Application(s) Topical every 12 hours  nystatin Powder 1 Application(s) Topical every 12 hours  pantoprazole  Injectable 40 milliGRAM(s) IV Push every 12 hours  phenylephrine    Infusion 4 MICROgram(s)/kG/Min (74.6 mL/Hr) IV Continuous <Continuous>  Phoxillum Filtration BK 4 / 2.5 5000 milliLiter(s) (1200 mL/Hr) CRRT <Continuous>  Phoxillum Filtration BK 4 / 2.5 5000 milliLiter(s) (1500 mL/Hr) CRRT <Continuous>  PrismaSOL Filtration BGK 4 / 2.5 5000 milliLiter(s) (200 mL/Hr) CRRT <Continuous>  QUEtiapine 25 milliGRAM(s) Oral at bedtime  romiPLOStim Injectable 100 MICROGram(s) SubCutaneous every 7 days  sodium chloride 3%  Inhalation 4 milliLiter(s) Inhalation every 12 hours  trimethoprim / sulfamethoxazole IVPB 150 milliGRAM(s) IV Intermittent <User Schedule>  trimethoprim / sulfamethoxazole IVPB 300 milliGRAM(s) IV Intermittent <User Schedule>  vasopressin Infusion 0.03 Unit(s)/Min (4.5 mL/Hr) IV Continuous <Continuous>  voriconazole IVPB 200 milliGRAM(s) IV Intermittent every 12 hours    MEDICATIONS  (PRN):  albuterol/ipratropium for Nebulization 3 milliLiter(s) Nebulizer every 6 hours PRN Shortness of Breath and/or Wheezing  FIRST- Mouthwash  BLM 10 milliLiter(s) Swish and Spit every 8 hours PRN Mouth Care  HYDROmorphone  Injectable 0.25 milliGRAM(s) IV Push every 3 hours PRN Moderate Pain (4 - 6)      PAST MEDICAL & SURGICAL HISTORY:  Diabetes      Transaminitis      Paroxysmal atrial fibrillation      Depression      BPH (benign prostatic hyperplasia)      Hypertension      Chronic atrial fibrillation      Coronary artery disease      Hepatocellular carcinoma      DM (diabetes mellitus)      HTN (hypertension)      Paroxysmal atrial fibrillation      Cirrhosis      HCC (hepatocellular carcinoma)      History of BPH      History of laparoscopic cholecystectomy      History of lumbar laminectomy      H/O prior ablation treatment      H/O percutaneous left heart catheterization          Vital Signs Last 24 Hrs  T(C): 37 (20 Jan 2025 11:00), Max: 37.5 (19 Jan 2025 23:00)  T(F): 98.6 (20 Jan 2025 11:00), Max: 99.5 (19 Jan 2025 23:00)  HR: 88 (20 Jan 2025 14:30) (75 - 109)  BP: 144/78 (19 Jan 2025 19:45) (144/78 - 144/78)  BP(mean): 101 (19 Jan 2025 19:45) (101 - 101)  RR: 28 (20 Jan 2025 14:30) (13 - 36)  SpO2: 94% (20 Jan 2025 14:30) (87% - 99%)    Parameters below as of 20 Jan 2025 13:00  Patient On (Oxygen Delivery Method): ventilator    O2 Concentration (%): 40    I&O's Summary    19 Jan 2025 07:01  -  20 Jan 2025 07:00  --------------------------------------------------------  IN: 3864.6 mL / OUT: 4017 mL / NET: -152.4 mL    20 Jan 2025 07:01  -  20 Jan 2025 14:54  --------------------------------------------------------  IN: 757.6 mL / OUT: 831 mL / NET: -73.4 mL                              9.5    0.65  )-----------( 7        ( 20 Jan 2025 05:52 )             26.4     01-20    135  |  103  |  19  ----------------------------<  103[H]  4.2   |  19[L]  |  <0.30[L]    Ca    8.3[L]      20 Jan 2025 12:14  Phos  3.6     01-20  Mg     2.3     01-20    TPro  3.8[L]  /  Alb  3.1[L]  /  TBili  28.9[H]  /  DBili  x   /  AST  60[H]  /  ALT  24  /  AlkPhos  169[H]  01-20          Culture - Bronchial (collected 01-19-25 @ 12:51)  Source: Combi-Cath  Gram Stain (01-19-25 @ 22:24):    No polymorphonuclear leukocytes per low power field    No Squamous epithelial cells per low power field    Rare Yeast like cells per oil power field  Preliminary Report (01-20-25 @ 11:01):    Commensal charity consistent with body site    Culture - Blood (collected 01-18-25 @ 15:45)  Source: .Blood BLOOD  Gram Stain (01-20-25 @ 04:05):    Growth in aerobic bottle: Gram Negative Rods  Preliminary Report (01-20-25 @ 04:06):    Growth in aerobic bottle: Gram Negative Rods    Culture - Blood (collected 01-18-25 @ 15:43)  Source: .Blood BLOOD  Gram Stain (01-20-25 @ 03:25):    Growth in aerobic bottle: Gram Negative Rods  Preliminary Report (01-20-25 @ 03:25):    Growth in aerobic bottle: Gram Negative Rods    Direct identification is available within approximately 3-5    hours either by Blood Panel Multiplexed PCR or Direct    MALDI-TOF. Details: [email or web address]  Organism: Blood Culture PCR (01-20-25 @ 07:03)  Organism: Blood Culture PCR (01-20-25 @ 07:03)    Culture - Fungal, Blood (collected 01-17-25 @ 02:36)  Source: .Blood Blood  Gram Stain (01-20-25 @ 10:04):    Growth in Myco/F Lytic Bottle: Gram Negative Rods  Preliminary Report (01-20-25 @ 10:04):    Growth in Myco/F Lytic Bottle: Gram Negative Rods    Culture - Blood (collected 01-13-25 @ 17:30)  Source: .Blood BLOOD  Final Report (01-18-25 @ 22:01):    No growth at 5 days    Culture - Blood (collected 01-13-25 @ 17:13)  Source: .Blood BLOOD  Final Report (01-18-25 @ 22:01):    No growth at 5 days          ROS: Unable to assess patient is lethargic, s/p tracheostomy    PHYSICAL EXAM:   Constitutional:  awake  Eyes:  PERRLA, Scleral icterus  ENMT: nc/at, no thrush, NGT  Neck: supple, trach   Respiratory: CTA B/L  Cardiovascular: RRR  Gastrointestinal: incision clean/dry/intact + Ostomy red low/no output, wound vac, peritoneal drains x1 bilious   Genitourinary: +scrotal edema w/ large fluid collection; black/green discoloration  Extremities: SCD's in place and working bilaterally, + UE/LE edema  Neurological: opens eyes to voice   Skin: large sacral decub, poor healing with breakdown

## 2025-01-20 NOTE — PROGRESS NOTE ADULT - NS_MD_PANP_GEN_ALL_CORE

## 2025-01-20 NOTE — PROGRESS NOTE ADULT - ASSESSMENT
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 Post op course complicated by acute hypoxic respiratory failure requiring intubation multiple times, most recently on 12/7 with conversion to tracheostomy on 12/17, e.coli bacteremia with RTOR on 12/7 for concern for worsening septic shock and cardiogenic shock s/p balloon pump placement and total abdominal colectomy in setting of ischemic bowel s/p OR on 12/9 for ileostomy creation, and olirugirc MORAIMA requiring CRRT and now intermittent HD.    Clinical course complicated by numerous infectious complications including Stenotrophomonas pneumonia, Enterococcus  faecalis isolated on abdominal drains and Clostridium paraputrificum bacteremia. Also with development of wounds including the sacrum and sloughing of scrotum in setting of loss of blood flow to the testes. Most recently with new cavitary pneumonia with coinciding Stenotrophomonas bacteremia.      BCx (1/11/25) Stenotrophomonas  Trach Culture (1/12/25) Stenotrophomonas  Fungitell 1/13 >500  Aspergillus galactomannan (1/12) 0.02    CT Chest (1/11/25) Cavitary left apical lesions measuring 1.9 cm and 1.5 cm in the region of previously groundglass opacity. Branching nodular opacities in the left upper lobe.     Cavitary pneumonia could be secondary to atypical pathogen although its also possible it is secondary to Stenotrophomonas    #Septic Shock, Cavitary pneumonia, Stenotrophomonas Bacteremia (recurrent)  --Pending susceptibilities of new Stenotrophomonas isolate recommend addition of Minocycline 200 mg BID  --Recommend exchanging central lines  --Recommend repeat CT Chest to reevaluate cavitary lung lesions. Would also pursue CT A/P  --Recommend sending 2x repeat blood cultures  --Continue IV Bactrim and IV Levofloxacin pending repeat CT Chest  --Continue Voriconazole 200 mg IV Q12H  --Follow up on Voriconazole trough    #Pancytopenia  Likely multifactorial etiology  --If CT Chest without worsening cavitation, may be able to switch Bactrim to minocycline and maintain Levofloxacin for double coverage.     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 Wes Carrero will be covering the patient starting from tomorrow. Please reach out to her for further questions and follow up.     Kyle Junior M.D.  [facility] Division of Infectious Disease  8AM-5PM Monday - Friday: Available on [employer] Teams  After Hours and Holidays (or if no response on [employer] Teams): Please contact the Infectious Diseases Office at [phone]     The above assessment and plan were discussed with SICU Team

## 2025-01-20 NOTE — PROGRESS NOTE ADULT - SUBJECTIVE AND OBJECTIVE BOX
[facility] DIVISION OF KIDNEY DISEASES AND HYPERTENSION -- FOLLOW UP NOTE  --------------------------------------------------------------------------------  Chief Complaint:    24 hour events/subjective:  Patient was seen and examined at bedside        PAST HISTORY  --------------------------------------------------------------------------------  No significant changes to PMH, PSH, FHx, SHx, unless otherwise noted    ALLERGIES & MEDICATIONS  --------------------------------------------------------------------------------  Allergies    No Known Allergies    Intolerances      Standing Inpatient Medications  buDESOnide    Inhalation Suspension 0.5 milliGRAM(s) Inhalation every 12 hours  chlorhexidine 0.12% Liquid 15 milliLiter(s) Oral Mucosa every 12 hours  chlorhexidine 2% Cloths 1 Application(s) Topical <User Schedule>  collagenase Ointment 1 Application(s) Topical daily  CRRT Treatment    <Continuous>  epoetin tonja (PROCRIT) Injectable 57373 Unit(s) IV Push every 7 days  filgrastim-sndz (ZARXIO) Injectable 480 MICROGram(s) SubCutaneous every 24 hours  ganciclovir IVPB 250 milliGRAM(s) IV Intermittent <User Schedule>  ganciclovir IVPB 125 milliGRAM(s) IV Intermittent <User Schedule>  insulin lispro (ADMELOG) corrective regimen sliding scale   SubCutaneous every 6 hours  insulin NPH human recombinant 6 Unit(s) SubCutaneous every 6 hours  levoFLOXacin IVPB 500 milliGRAM(s) IV Intermittent every 24 hours  melatonin 5 milliGRAM(s) Oral at bedtime  methylPREDNISolone sodium succinate Injectable 16 milliGRAM(s) IV Push daily  metoprolol tartrate Injectable 2.5 milliGRAM(s) IV Push every 6 hours  midodrine 30 milliGRAM(s) Oral every 8 hours  mupirocin 2% Ointment 1 Application(s) Topical every 12 hours  nystatin Powder 1 Application(s) Topical every 12 hours  pantoprazole  Injectable 40 milliGRAM(s) IV Push every 12 hours  phenylephrine    Infusion 4 MICROgram(s)/kG/Min IV Continuous <Continuous>  Phoxillum Filtration BK 4 / 2.5 5000 milliLiter(s) CRRT <Continuous>  Phoxillum Filtration BK 4 / 2.5 5000 milliLiter(s) CRRT <Continuous>  PrismaSOL Filtration BGK 4 / 2.5 5000 milliLiter(s) CRRT <Continuous>  QUEtiapine 25 milliGRAM(s) Oral at bedtime  romiPLOStim Injectable 100 MICROGram(s) SubCutaneous every 7 days  sodium chloride 3%  Inhalation 4 milliLiter(s) Inhalation every 12 hours  trimethoprim / sulfamethoxazole IVPB 150 milliGRAM(s) IV Intermittent <User Schedule>  trimethoprim / sulfamethoxazole IVPB 300 milliGRAM(s) IV Intermittent <User Schedule>  vasopressin Infusion 0.03 Unit(s)/Min IV Continuous <Continuous>  voriconazole IVPB 200 milliGRAM(s) IV Intermittent every 12 hours    PRN Inpatient Medications  albuterol/ipratropium for Nebulization 3 milliLiter(s) Nebulizer every 6 hours PRN  FIRST- Mouthwash  BLM 10 milliLiter(s) Swish and Spit every 8 hours PRN  HYDROmorphone  Injectable 0.25 milliGRAM(s) IV Push every 3 hours PRN      REVIEW OF SYSTEMS- unable to obtain          All other systems were reviewed and are negative, except as noted.    VITALS/PHYSICAL EXAM  --------------------------------------------------------------------------------  T(C): 37 (01-20-25 @ 11:00), Max: 37.5 (01-19-25 @ 23:00)  HR: 104 (01-20-25 @ 11:00) (75 - 109)  BP: 144/78 (01-19-25 @ 19:45) (144/78 - 144/78)  RR: 26 (01-20-25 @ 11:00) (13 - 36)  SpO2: 90% (01-20-25 @ 11:00) (90% - 99%)  Wt(kg): --        01-19-25 @ 07:01  -  01-20-25 @ 07:00  --------------------------------------------------------  IN: 3864.6 mL / OUT: 4017 mL / NET: -152.4 mL    01-20-25 @ 07:01 - 01-20-25 @ 11:20  --------------------------------------------------------  IN: 602.9 mL / OUT: 421 mL / NET: 181.9 mL      Physical Exam:  Gen: on vent via tracheostomy. opens eyes.  HEENT: Icterus present  Abdomen: + ileostomy with liquid brown stool, VAC dressing present   MSK: +LE edema and UE edema.  Skin: Superficial skin ulceration b/l thighs/dark necrotic appearing scrotum.   Vascular: Right IJ temporary dialysis catheter       LABS/STUDIES  --------------------------------------------------------------------------------              9.5    0.65  >-----------<  7        [01-20-25 @ 05:52]              26.4     136  |  103  |  20  ----------------------------<  98      [01-20-25 @ 05:52]  4.1   |  19  |  <0.30        Ca     8.1     [01-20-25 @ 05:52]      Mg     2.3     [01-20-25 @ 05:52]      Phos  3.7     [01-20-25 @ 05:52]    TPro  3.5  /  Alb  3.0  /  TBili  28.8  /  DBili  x   /  AST  59  /  ALT  21  /  AlkPhos  165  [01-20-25 @ 05:52]    PT/INR: PT 25.4 , INR 2.23       [01-20-25 @ 05:52]  PTT: 66.2       [01-20-25 @ 05:52]      Creatinine Trend:  SCr <0.30 [01-20 @ 05:52]  SCr <0.30 [01-20 @ 00:30]  SCr <0.30 [01-19 @ 18:02]  SCr <0.30 [01-19 @ 11:48]  SCr <0.30 [01-19 @ 05:41]          Urinalysis - [01-20-25 @ 05:52]      Color  / Appearance  / SG  / pH       Gluc 98 / Ketone   / Bili  / Urobili        Blood  / Protein  / Leuk Est  / Nitrite       RBC  / WBC  / Hyaline  / Gran  / Sq Epi  / Non Sq Epi  / Bacteria       Iron 76, TIBC Unable to calculate Test Repeated, %sat Unable to calculate Test Repeated      [01-12-25 @ 17:45]  Ferritin 867      [01-12-25 @ 17:45]  Vitamin D (25OH) <6.0      [11-21-24 @ 08:32]  TSH 0.68      [12-12-24 @ 12:27]  Lipid: chol 114, , HDL 47, LDL --      [04-24-24 @ 06:48]

## 2025-01-20 NOTE — PROGRESS NOTE ADULT - NS ATTEND OPT1 GEN_ALL_CORE

## 2025-01-20 NOTE — PROGRESS NOTE ADULT - NS ATTEND AMEND GEN_ALL_CORE FT
Continues to have (+) blood cx for Stenotrophamonas.   ID following.  Significant marrow failure.  Rec'd plt.  NPlate and Neupogen dosed.  CRRT overnight, circuit clotting however.  Stable doses of vasopressors.  TBili 28. Not improving.  GOC conversation had w family multiple times.  No interest in descalating care.

## 2025-01-20 NOTE — PROGRESS NOTE ADULT - ASSESSMENT
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 Post-op course c/b worsening mental status and acute hypoxic respiratory failure requiring multiple intubations/extubations, currently extubated (as of 11/26/24) and colonic ileus s/p several rounds of Relistor and Neostigmine with NGT and rectal tube currently in place now with septic shock due to ischemic bowel s/p total colectomy with ostomy creation with MORAIMA on CRRT.        1. s/p OLT 11/15/24 with oliguric MORAIMA in setting of septic shock and poor liver function.   CT AP non-con: Bilateral renal cysts including cysts with peripheral calcification in the left kidney.  Initiated on CRRT 12/7/24- 12/15/24 at 1AM stopped. s/p IHD 12/18/24 however required levophed.    Has been back on CRRT but stopped 1/7/25 due to catheter malfunction. Restarted 1/8/25 at 8PM with new catheter.   oliguric and hypotensive on vasopressors, continue Nocturnal CRRT (transitioned to nocturnal CRRT 1/14/25) UF 0-50cc/hr as tolerated.

## 2025-01-20 NOTE — PROGRESS NOTE ADULT - NS ATTEND BILL GEN_ALL_CORE
Attending to bill

## 2025-01-20 NOTE — PROGRESS NOTE ADULT - SUBJECTIVE AND OBJECTIVE BOX
INTERVAL EVENTS:  - Filgastrin 01/19   - TF 20   - CCRT -50   - sending combicath fungal culture   - 1 U PLTs, 1 U PRBC  - CPAP in AM  - Daily Filgastrim       NEURO  RASS (if intubated): 		CAM ICU (if concern for delirium):  Exam:   Meds: HYDROmorphone  Injectable 0.25 milliGRAM(s) IV Push every 3 hours PRN Moderate Pain (4 - 6)  melatonin 5 milliGRAM(s) Oral at bedtime  QUEtiapine 25 milliGRAM(s) Oral at bedtime      RESPIRATORY  RR: 20 (01-20-25 @ 01:15) (13 - 39)  SpO2: 97% (01-20-25 @ 01:15) (94% - 99%)  Wt(kg): --  Exam:  Mechanical Ventilation: Mode: AC/ CMV (Assist Control/ Continuous Mandatory Ventilation), RR (machine): 14, RR (patient): 26, TV (machine): 470, FiO2: 40, PEEP: 5, ITime: 0.9, MAP: 7.9, PIP: 19  ABG - ( 19 Jan 2025 05:37 )  pH: 7.34  /  pCO2: 35    /  pO2: 111   / HCO3: 19    / Base Excess: -6.2  /  SaO2: 99.9    Lactate: x                Meds: albuterol/ipratropium for Nebulization 3 milliLiter(s) Nebulizer every 6 hours PRN Shortness of Breath and/or Wheezing  buDESOnide    Inhalation Suspension 0.5 milliGRAM(s) Inhalation every 12 hours  sodium chloride 3%  Inhalation 4 milliLiter(s) Inhalation every 12 hours      CARDIOVASCULAR  HR: 90 (01-20-25 @ 01:15) (75 - 109)  BP: 144/78 (01-19-25 @ 19:45) (144/78 - 144/78)  BP(mean): 101 (01-19-25 @ 19:45) (101 - 101)  ABP: 93/44 (01-20-25 @ 01:15) (90/41 - 115/61)  ABP(mean): 63 (01-20-25 @ 01:15) (59 - 83)  Wt(kg): --  CVP(cm H2O): --        Meds: metoprolol tartrate Injectable 2.5 milliGRAM(s) IV Push every 6 hours  midodrine 30 milliGRAM(s) Oral every 8 hours  phenylephrine    Infusion 4 MICROgram(s)/kG/Min IV Continuous <Continuous>      GI/NUTRITION  Exam:   Diet:   Meds: pantoprazole  Injectable 40 milliGRAM(s) IV Push every 12 hours      GENITOURINARY  I&O's Detail    01-18 @ 07:01 - 01-19 @ 07:00  --------------------------------------------------------  IN:    Albumin 25%  -  50 mL: 150 mL    Enteral Tube Flush: 100 mL    IV PiggyBack: 125 mL    IV PiggyBack: 1175 mL    Phenylephrine: 1126.8 mL    Platelets - Single Donor: 450 mL    Vasopressin: 108 mL    Vital1.5: 200 mL  Total IN: 3434.8 mL    OUT:    Bulb (mL): 1075 mL    Ileostomy (mL): 50 mL    Indwelling Catheter - Urethral (mL): 20 mL    Other (mL): 998 mL    VAC (Vacuum Assisted Closure) System (mL): 0 mL  Total OUT: 2143 mL    Total NET: 1291.8 mL      01-19 @ 07:01 - 01-20 @ 01:26  --------------------------------------------------------  IN:    Albumin 25%  -  50 mL: 100 mL    Enteral Tube Flush: 50 mL    IV PiggyBack: 1100 mL    Phenylephrine: 470.3 mL    Platelets - Single Donor: 450 mL    Vasopressin: 81 mL    Vital1.5: 310 mL  Total IN: 2561.3 mL    OUT:    Bulb (mL): 1200 mL    Ileostomy (mL): 0 mL    Indwelling Catheter - Urethral (mL): 5 mL    Other (mL): 1660 mL    VAC (Vacuum Assisted Closure) System (mL): 200 mL  Total OUT: 3065 mL    Total NET: -503.7 mL          01-20    133[L]  |  101  |  20  ----------------------------<  104[H]  4.1   |  19[L]  |  <0.30[L]    Ca    8.6      20 Jan 2025 00:30  Phos  3.5     01-20  Mg     2.4     01-20    TPro  3.7[L]  /  Alb  3.2[L]  /  TBili  28.9[H]  /  DBili  x   /  AST  58[H]  /  ALT  19  /  AlkPhos  171[H]  01-20    Meds: albumin human 25% IVPB 50 milliLiter(s) IV Intermittent every 8 hours      HEMATOLOGIC  Meds: romiPLOStim Injectable 100 MICROGram(s) SubCutaneous every 7 days                          7.9    0.59  )-----------( 12       ( 20 Jan 2025 00:30 )             22.0     PT/INR - ( 19 Jan 2025 05:41 )   PT: 24.4 sec;   INR: 2.16 ratio         PTT - ( 19 Jan 2025 05:41 )  PTT:63.0 sec    INFECTIOUS DISEASES  T(C): 37.5 (01-19-25 @ 23:00), Max: 37.5 (01-19-25 @ 23:00)  Wt(kg): --  WBC Count: 0.59 K/uL (01-20 @ 00:30)  WBC Count: 0.77 K/uL (01-19 @ 18:02)  WBC Count: 1.24 K/uL (01-19 @ 05:41)    Recent Cultures:  Specimen Source: Combi-Cath, 01-19 @ 12:51; Results --; Gram Stain:   No polymorphonuclear leukocytes per low power field  No Squamous epithelial cells per low power field  Rare Yeast like cells per oil power field[!]; Organism: --  Specimen Source: .Blood BLOOD, 01-18 @ 15:45; Results   No growth at 24 hours; Gram Stain: --; Organism: --  Specimen Source: .Blood BLOOD, 01-18 @ 15:43; Results   No growth at 24 hours; Gram Stain: --; Organism: --  Specimen Source: .Blood Blood, 01-17 @ 02:36; Results   Culture is being performed. Fungal cultures are held for 6 weeks.; Gram Stain: --; Organism: --  Specimen Source: .Blood BLOOD, 01-13 @ 17:30; Results   No growth at 5 days; Gram Stain: --; Organism: --  Specimen Source: .Blood BLOOD, 01-13 @ 17:13; Results   No growth at 5 days; Gram Stain: --; Organism: --    Meds: epoetin tonja (PROCRIT) Injectable 52313 Unit(s) IV Push every 7 days  filgrastim-sndz (ZARXIO) Injectable 480 MICROGram(s) SubCutaneous every 24 hours  ganciclovir IVPB 250 milliGRAM(s) IV Intermittent <User Schedule>  ganciclovir IVPB 125 milliGRAM(s) IV Intermittent <User Schedule>  levoFLOXacin IVPB 500 milliGRAM(s) IV Intermittent every 24 hours  trimethoprim / sulfamethoxazole IVPB 150 milliGRAM(s) IV Intermittent <User Schedule>  trimethoprim / sulfamethoxazole IVPB 300 milliGRAM(s) IV Intermittent <User Schedule>  voriconazole IVPB 200 milliGRAM(s) IV Intermittent every 12 hours      ENDOCRINE  Capillary Blood Glucose    Meds: insulin lispro (ADMELOG) corrective regimen sliding scale   SubCutaneous every 6 hours  insulin NPH human recombinant 6 Unit(s) SubCutaneous every 6 hours  methylPREDNISolone sodium succinate Injectable 16 milliGRAM(s) IV Push daily  vasopressin Infusion 0.03 Unit(s)/Min IV Continuous <Continuous>      OTHER MEDICATIONS:  chlorhexidine 0.12% Liquid 15 milliLiter(s) Oral Mucosa every 12 hours  chlorhexidine 2% Cloths 1 Application(s) Topical <User Schedule>  collagenase Ointment 1 Application(s) Topical daily  CRRT Treatment    <Continuous>  FIRST- Mouthwash  BLM 10 milliLiter(s) Swish and Spit every 8 hours PRN  mupirocin 2% Ointment 1 Application(s) Topical every 12 hours  nystatin Powder 1 Application(s) Topical every 12 hours  Phoxillum Filtration BK 4 / 2.5 5000 milliLiter(s) CRRT <Continuous>  Phoxillum Filtration BK 4 / 2.5 5000 milliLiter(s) CRRT <Continuous>  PrismaSOL Filtration BGK 4 / 2.5 5000 milliLiter(s) CRRT <Continuous>      IMAGING: INTERVAL EVENTS:  - Filgastrin 01/19 - TF 20   - CCRT -50   - sending combicath fungal culture   - 1 U PLTs, 1 U PRBC  - CPAP in AM  - Daily Filgastrin      NEURO  RASS (if intubated): 		CAM ICU (if concern for delirium):  Exam: grimaces to pain, tracks  Meds: HYDROmorphone  Injectable 0.25 milliGRAM(s) IV Push every 3 hours PRN Moderate Pain (4 - 6)  melatonin 5 milliGRAM(s) Oral at bedtime  QUEtiapine 25 milliGRAM(s) Oral at bedtime      RESPIRATORY  RR: 20 (01-20-25 @ 01:15) (13 - 39)  SpO2: 97% (01-20-25 @ 01:15) (94% - 99%)  Wt(kg): --  Exam: unlabored, clear to auscultation bilaterally  Mechanical Ventilation: Mode: AC/ CMV (Assist Control/ Continuous Mandatory Ventilation), RR (machine): 14, RR (patient): 26, TV (machine): 470, FiO2: 40, PEEP: 5, ITime: 0.9, MAP: 7.9, PIP: 19  ABG - ( 19 Jan 2025 05:37 )  pH: 7.34  /  pCO2: 35    /  pO2: 111   / HCO3: 19    / Base Excess: -6.2  /  SaO2: 99.9    Lactate: x                Meds: albuterol/ipratropium for Nebulization 3 milliLiter(s) Nebulizer every 6 hours PRN Shortness of Breath and/or Wheezing  buDESOnide    Inhalation Suspension 0.5 milliGRAM(s) Inhalation every 12 hours  sodium chloride 3%  Inhalation 4 milliLiter(s) Inhalation every 12 hours      CARDIOVASCULAR  HR: 90 (01-20-25 @ 01:15) (75 - 109)  BP: 144/78 (01-19-25 @ 19:45) (144/78 - 144/78)  BP(mean): 101 (01-19-25 @ 19:45) (101 - 101)  ABP: 93/44 (01-20-25 @ 01:15) (90/41 - 115/61)  ABP(mean): 63 (01-20-25 @ 01:15) (59 - 83)  Wt(kg): --  CVP(cm H2O): --      Exam: regular rate and rhythm  Cardiac Rhythm: sinus  Perfusion     [x]Adequate   [ ]Inadequate  Mentation   [x]Normal       [ ]Reduced  Extremities  [x]Warm         [ ]Cool  Volume Status [ ]Hypervolemic [x]Euvolemic [ ]Hypovolemic  Meds: metoprolol tartrate Injectable 2.5 milliGRAM(s) IV Push every 6 hours  midodrine 30 milliGRAM(s) Oral every 8 hours  phenylephrine    Infusion 4 MICROgram(s)/kG/Min IV Continuous <Continuous>      GI/NUTRITION  Exam: soft, nontender, nondistended  Diet:  trickle feeds  Meds: pantoprazole  Injectable 40 milliGRAM(s) IV Push every 12 hours      GENITOURINARY  I&O's Detail    01-18 @ 07:01 - 01-19 @ 07:00  --------------------------------------------------------  IN:    Albumin 25%  -  50 mL: 150 mL    Enteral Tube Flush: 100 mL    IV PiggyBack: 125 mL    IV PiggyBack: 1175 mL    Phenylephrine: 1126.8 mL    Platelets - Single Donor: 450 mL    Vasopressin: 108 mL    Vital1.5: 200 mL  Total IN: 3434.8 mL    OUT:    Bulb (mL): 1075 mL    Ileostomy (mL): 50 mL    Indwelling Catheter - Urethral (mL): 20 mL    Other (mL): 998 mL    VAC (Vacuum Assisted Closure) System (mL): 0 mL  Total OUT: 2143 mL    Total NET: 1291.8 mL      01-19 @ 07:01 - 01-20 @ 01:26  --------------------------------------------------------  IN:    Albumin 25%  -  50 mL: 100 mL    Enteral Tube Flush: 50 mL    IV PiggyBack: 1100 mL    Phenylephrine: 470.3 mL    Platelets - Single Donor: 450 mL    Vasopressin: 81 mL    Vital1.5: 310 mL  Total IN: 2561.3 mL    OUT:    Bulb (mL): 1200 mL    Ileostomy (mL): 0 mL    Indwelling Catheter - Urethral (mL): 5 mL    Other (mL): 1660 mL    VAC (Vacuum Assisted Closure) System (mL): 200 mL  Total OUT: 3065 mL    Total NET: -503.7 mL          01-20    133[L]  |  101  |  20  ----------------------------<  104[H]  4.1   |  19[L]  |  <0.30[L]    Ca    8.6      20 Jan 2025 00:30  Phos  3.5     01-20  Mg     2.4     01-20    TPro  3.7[L]  /  Alb  3.2[L]  /  TBili  28.9[H]  /  DBili  x   /  AST  58[H]  /  ALT  19  /  AlkPhos  171[H]  01-20    Meds: albumin human 25% IVPB 50 milliLiter(s) IV Intermittent every 8 hours      HEMATOLOGIC  Meds: romiPLOStim Injectable 100 MICROGram(s) SubCutaneous every 7 days                          7.9    0.59  )-----------( 12       ( 20 Jan 2025 00:30 )             22.0     PT/INR - ( 19 Jan 2025 05:41 )   PT: 24.4 sec;   INR: 2.16 ratio         PTT - ( 19 Jan 2025 05:41 )  PTT:63.0 sec    INFECTIOUS DISEASES  T(C): 37.5 (01-19-25 @ 23:00), Max: 37.5 (01-19-25 @ 23:00)  Wt(kg): --  WBC Count: 0.59 K/uL (01-20 @ 00:30)  WBC Count: 0.77 K/uL (01-19 @ 18:02)  WBC Count: 1.24 K/uL (01-19 @ 05:41)    Recent Cultures:  Specimen Source: Combi-Cath, 01-19 @ 12:51; Results --; Gram Stain:   No polymorphonuclear leukocytes per low power field  No Squamous epithelial cells per low power field  Rare Yeast like cells per oil power field[!]; Organism: --  Specimen Source: .Blood BLOOD, 01-18 @ 15:45; Results   No growth at 24 hours; Gram Stain: --; Organism: --  Specimen Source: .Blood BLOOD, 01-18 @ 15:43; Results   No growth at 24 hours; Gram Stain: --; Organism: --  Specimen Source: .Blood Blood, 01-17 @ 02:36; Results   Culture is being performed. Fungal cultures are held for 6 weeks.; Gram Stain: --; Organism: --  Specimen Source: .Blood BLOOD, 01-13 @ 17:30; Results   No growth at 5 days; Gram Stain: --; Organism: --  Specimen Source: .Blood BLOOD, 01-13 @ 17:13; Results   No growth at 5 days; Gram Stain: --; Organism: --    Meds: epoetin tonja (PROCRIT) Injectable 62437 Unit(s) IV Push every 7 days  filgrastim-sndz (ZARXIO) Injectable 480 MICROGram(s) SubCutaneous every 24 hours  ganciclovir IVPB 250 milliGRAM(s) IV Intermittent <User Schedule>  ganciclovir IVPB 125 milliGRAM(s) IV Intermittent <User Schedule>  levoFLOXacin IVPB 500 milliGRAM(s) IV Intermittent every 24 hours  trimethoprim / sulfamethoxazole IVPB 150 milliGRAM(s) IV Intermittent <User Schedule>  trimethoprim / sulfamethoxazole IVPB 300 milliGRAM(s) IV Intermittent <User Schedule>  voriconazole IVPB 200 milliGRAM(s) IV Intermittent every 12 hours      ENDOCRINE  Capillary Blood Glucose    Meds: insulin lispro (ADMELOG) corrective regimen sliding scale   SubCutaneous every 6 hours  insulin NPH human recombinant 6 Unit(s) SubCutaneous every 6 hours  methylPREDNISolone sodium succinate Injectable 16 milliGRAM(s) IV Push daily  vasopressin Infusion 0.03 Unit(s)/Min IV Continuous <Continuous>      OTHER MEDICATIONS:  chlorhexidine 0.12% Liquid 15 milliLiter(s) Oral Mucosa every 12 hours  chlorhexidine 2% Cloths 1 Application(s) Topical <User Schedule>  collagenase Ointment 1 Application(s) Topical daily  CRRT Treatment    <Continuous>  FIRST- Mouthwash  BLM 10 milliLiter(s) Swish and Spit every 8 hours PRN  mupirocin 2% Ointment 1 Application(s) Topical every 12 hours  nystatin Powder 1 Application(s) Topical every 12 hours  Phoxillum Filtration BK 4 / 2.5 5000 milliLiter(s) CRRT <Continuous>  Phoxillum Filtration BK 4 / 2.5 5000 milliLiter(s) CRRT <Continuous>  PrismaSOL Filtration BGK 4 / 2.5 5000 milliLiter(s) CRRT <Continuous>      IMAGING:

## 2025-01-21 NOTE — PROCEDURE NOTE - NSPOSTCAREGUIDE_GEN_A_CORE
Care for catheter as per unit/ICU protocols
Verbal/written post procedure instructions were given to patient/caregiver/Care for catheter as per unit/ICU protocols
Care for catheter as per unit/ICU protocols

## 2025-01-21 NOTE — PROCEDURE NOTE - NSSECUREBY_VASC_A_CORE
stabilization device/sterile occulsive dressing/sterile pressure dressing/tape
stabilization device/sterile occulsive dressing/tape
sterile occulsive dressing
stabilization device/sterile occulsive dressing

## 2025-01-21 NOTE — PROCEDURE NOTE - NSTOLERANCE_GEN_A_CORE
Patient tolerated procedure well.

## 2025-01-21 NOTE — CHART NOTE - NSCHARTNOTEFT_GEN_A_CORE
NUTRITION FOLLOW UP NOTE    PATIENT SEEN FOR: sicu follow up     SOURCE: [] Patient  [x] Current Medical Record  [] RN  [] Family/support person at bedside  [x] Patient unavailable/inappropriate  [x] Other: Team     CHART REVIEWED/EVENTS NOTED.  [x] Nutrition Status:  -OLT 11/15  -Trach   -CRRT     DIET ORDER:   Diet, NPO:   Except Medications (01-21-25)      NUTRITION INTAKE/PROVISION:  [x] Enteral Nutrition: Held  -Decreased ostomy output   -High pressor support   -Now noted with emesis     ANTHROPOMETRICS:  Drug Dosing Weight  Height (cm): 182.9 (09 Dec 2024 10:11)  Weight (kg): 99.5 (25 Dec 2024 00:00)  BMI (kg/m2): 29.7 (25 Dec 2024 00:00)    NUTRITIONALLY PERTINENT MEDICATIONS:  MEDICATIONS  (STANDING):  ganciclovir IVPB  insulin lispro (ADMELOG) corrective regimen sliding scale  levoFLOXacin IVPB  methylPREDNISolone sodium succinate Injectable  metoprolol tartrate Injectable  midodrine  minocycline IVPB  pantoprazole  Injectable  phenylephrine    Infusion  trimethoprim / sulfamethoxazole IVPB  vasopressin Infusion  voriconazole IVPB       NUTRITIONALLY PERTINENT LABS:  01-21 Na135 mmol/L Glu 117 mg/dL[H] K+ 4.3 mmol/L Cr  <0.30 mg/dL[L] BUN 20 mg/dL 01-21 Phos 3.6 mg/dL 01-21 Alb 2.7 g/dL[L]01-21 ALT 26 U/L AST 54 U/L[H] Alkaline Phosphatase 175 U/L[H]            Finger Sticks:  POCT Blood Glucose.: 112 mg/dL (01-21 @ 12:27)  POCT Blood Glucose.: 118 mg/dL (01-20 @ 19:45)  POCT Blood Glucose.: 88 mg/dL (01-20 @ 18:04)      NUTRITIONALLY PERTINENT MEDICATIONS/LABS:  [x] Reviewed  [x] Relevant notes on medications/labs:  -Phylephrine/Vaso   -Solu-medrol  -Sliding scale insulin    EDEMA:  [x] Reviewed  [] Relevant notes:    GI/ I&O: (per flowsheet)  [x] Reviewed  -Ostomy: 95cc (1/21)     SKIN:   [x] Pressure injury previously noted  -sacrum: unstageable  -R upper back: suspected deep tissue injury       ESTIMATED NEEDS:  [x] No change:  Energy:  2178-2582kcal/day (27-32 kcal/kg)  Protein:  97-121g/day (1.2-1.5 g/kg)  Fluid:   ml/day or [X] defer to team  Based on: IBW of  80.7kg     NUTRITION DIAGNOSIS:  [X] Prior Dx:  1. Increased protein-energy needs   2. Severe malnutrition (Acute)    EDUCATION:  [] Yes:  [x] Not appropriate/warranted    NUTRITION CARE PLAN:  1. Diet: when medically feasible:   - EN: Vital 1.5 @ 65mL x 24hrs providing 1560mL of formula, 2340kcal/day (29kcal/kg), 105g protein/day (1.3g/kg), 1192mL free water - based on 80.7kg   2. Supplements: Alfredo BID   3. Multivitamin/mineral supplementation: Nephro-kole     MONITORING AND EVALUATION:   RD remains available upon request and will follow up per protocol.    Malorie Pike, MS, RDN, CDN (Teams)   Available on [facility] TEAMS

## 2025-01-21 NOTE — ADVANCED PRACTICE NURSE CONSULT - REASON FOR CONSULT
Continued Ostomy maintenance ; Complete pouch change.  
Drain management, pouching. (s/p ileostomy on 12/9/2024)  Complete pouching system & incisional vac dressing changes. HPI is noted.     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 Post-op course c/b worsening mental status and acute hypoxic respiratory failure requiring multiple intubations/extubations, currently intubated (as of 12/7/2024) and cardiogenic shock s/p Percutaneous insertion of an intra-aortic balloon pump and septic shock/colonic ischemia s/p total abdominal colectomy with ileostomy on 12/7/24, s/p ex-lap w/ileostomy on 12/9/24. Urology consulted for scrotal edema w/ large skin breakdown.   Patient seen and examined in CTU with family members present at bedside. Patient somnolent and intubated. Per family member, patient has scrotal swelling since one week ago, they elevated his scrotum by using towel. However, the symptom is getting worse recent two days with large area skin breakdown and redness on the whole scrotal area, as well the bilateral groin and internal thigh. Castro in place with clear yellowish colored urine.       
Drain management, pouching. (s/p ileostomy on 12/9/2024)  Ongoing f/u for complete pouching system & incisional vac dressing changes with PT wound care.     
Ongoing f/u for complete pouching system & incisional vac dressing changes with PT wound care.   s/p ileostomy on 12/9/2024    
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 Post-op course c/b worsening mental status and re-intubation 11/20 for acute hypoxemic respiratory failure 2/2 aspiration, extubated 11/24 and re-intubated 11/24. Pt then extubated 11/26, now on nasal cannula.  Colonic ileus s/p several rounds of Relistor and Neostigmine, rectal tube, now with improvement. 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  Admitted for liver transplant.           
Ongoing f/u for complete pouching system & incisional vac dressing changes with PT wound care.   s/p ileostomy on 12/9/2024  
Wound care consult initiated by RN to assess patient's skin for a possible skin injury on knees, left hip, and forehead    Reason for Admission: Liver Transplant  History of Present Illness:   73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 Pt underwent Y90 Sept, 2023 with favorable treatment response.Pt completed OLTx evaluation and listed for OLTx 5/03/2024       
Drain management, pouching. (s/p ileostomy on 12/9/2024)  Ongoing f/u for complete pouching system & incisional vac dressing changes with PT wound care.     
Ongoing f/u for complete pouching system & abd vac dressing changes with PT wound care.   s/p ileostomy on 12/9/2024  
Ongoing f/u for complete pouching system & incisional vac dressing changes with PT wound care.   s/p ileostomy on 12/9/2024    
Ongoing f/u for complete pouching system and abdominal wound vac dressing changes with PT wound care.   s/p ileostomy on 12/9/2024
Drain management, pouching. (s/p ileostomy on 12/9/2024)  Ongoing f/u for complete pouching system & incisional vac dressing changes with PT wound care.   
Drain management, pouching. (s/p ileostomy on 12/9/2024)  Ongoing f/u for complete pouching system & incisional vac dressing changes with PT wound care.     
Ongoing f/u for complete pouching system & abd vac dressing changes with PT wound care.   s/p ileostomy on 12/9/2024
Ongoing f/u for complete pouching system & incisional vac dressing changes with PT wound care.   s/p ileostomy on 12/9/2024  
Ongoing f/u for complete pouching system & incisional vac dressing changes with PT wound care.   s/p ileostomy on 12/9/2024    
Ongoing f/u for complete pouching system and abdominal wound vac dressing changes with PT wound care.   s/p ileostomy on 12/9/2024

## 2025-01-21 NOTE — PROGRESS NOTE ADULT - ATTENDING COMMENTS
Pt seen and examined with SICU team, agree with above.     Poor prognosis discussed with patient's son at bedside. 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 Patient's son noted that he is doing much better than he was previously, when he had an IABP and was on multiple pressors, and that he is going to get through this, that statistics don't apply in his situation because he's already survived so many things. This conversation was consistent with others that the Critical Care team have had with multiple members of Dr. Davison's family, who seem certain that he has a good prognosis.     S/p OLT:  - Immunosuppression mostly held for infections  - On Solumedrol  - Tbili 29, INR 1.9    CM viremia:  - On ganciclovir  - Repeat titer 721 today (from 1280)    Bone marrow suppression/ failure:  - WBC has been less than one since 1/17  - On filgrastim-sndz, Nplate  - Profound thrombocytopenia (BM suppression as well as consumption) - has been relatively resistant to platelet transfusion. Yesterday received 2 units for line changes, increased as high as 28, then decreased to 13 over 4 hours.  - Chronic anemia - on epo, with intermittent PRBC transfusions  - Seroquel stopped for TCP  - ID decision regarding stopping bactrim pending    Shock:  - Persistently requiring pressors, doses varying. Yesterday kassandra 0.2mcg/kg/min, today 1.7mcg/kg/min  - Vasopressin 0.03units/min  - Midodrine 30mg tid  - LA 3.1  - Etiology likely multifactorial, including sepsis  - On kassandra due to multiple episodes of RVR    Altered mental status:  - Multifactorial, including shock, sepsis, and hepatic    Hypoxic respiratory failure:  - Multiple intubations and extubations, s/p trach  - Tolerating a few hours of T/C daily, will continue as tolerated  - On 3% saline/ duonebs for secretions    Afib with RVR:  - Has been in RVR multiple times - kassandra was chosen instead of levo for this reason. Additionally, will continue very low-dose metoprolol to help control heart rate.    Stenotrophomonas bacteremia:  - Likely source is pulmonary  - Lines were changed yesterday as Steno can form biofilms (lines are not the original source of this infection).   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 Of the past 30 days, there were approximately 5 days when patient was able to receive near-goal amount of tube feeds with ileostomy output of less than 1L.  - Pt is not a candidate for TPN given liver dysfunction and bacteremia  - Will check patient's weight, although given edema, this is unlikely to reflect body condition.  - Albumin will not reflect nutritional status given frequent alb administration, and pre-albumin will not reflect nutritional status as it's an acute phase reactant

## 2025-01-21 NOTE — PROCEDURE NOTE - NSNUMOFLUMENS_VASC_A_CORE
single lumen
triple lumen
triple lumen
double lumen
double lumen
triple lumen
double lumen
single lumen
single lumen
triple lumen
triple lumen
single lumen
triple lumen

## 2025-01-21 NOTE — PROCEDURE NOTE - NSPATIENTPOSTION_GEN_A_CORE
supine
Trendelenburg
Trendelenburg
supine
supine
Trendelenburg
supine
Trendelenburg
supine
supine
Trendelenburg

## 2025-01-21 NOTE — ADVANCED PRACTICE NURSE CONSULT - ASSESSMENT
Chart reviewed and events noted.   In to see patient with PT wound (BRYSON Angeles) for routine vac dressing and complete ostomy pouching system changes. Pls see separate notes.  Patient remain in SICU with son at the bedside.  NG tube feeds and trach collar in place. Patient in SICU bed awake, non verbal verbal, no s/s of resp distress. Complete pouching system changed:  Stoma 1 3/8" discolored tan/yellowish on the top and dark brown/black slough/necrosis at the base circumferentially.  No bleeding from stoma noted. Minimal output in the pouch.  Peristomal skin noted with adherent yellow tan slough from  5-7 o'clock remains  with a bridge of intact skin and brown/black dry adherent tissue noted at 1'oclock.  Mucocutaneous junction intact.  Remains with adherent black/ brown tissue noted  at the base circumferentially Creases/skin indents at 3 and 9 o'clock. Dusted the peristomal skin with stoma powder excess removed.  Dab in with Cavilon 3M no sting barrier liquid film. Comfeel Plus transparent dressing applied to peristomal skin . Repouched w/ 2 1/4" flat skin barrier cut the opening to patient's measured stoma size (cut a little larger).  Bead of stoma paste applied to skin creases to level the surface and at the back of skin barrier near opening to caulk.   Re-pouched with the  Lianne drainable pouch. Supplies and pattern  left at the bedside. Will continue to follow.

## 2025-01-21 NOTE — CHART NOTE - NSCHARTNOTEFT_GEN_A_CORE
Patient with severe thrombocytopenia currently on nplate 1mcg/kg, bactrim and Seroquel known to worsen thrombocytopenia; consider finding alternative medication if possible. (Consider switching bactrim to atovaquone).

## 2025-01-21 NOTE — PROCEDURE NOTE - NSPROCNAME_GEN_A_CORE
Midline Insertion
Midline Insertion
Central Line Insertion
Midline Insertion
Central Line Insertion
Arterial Puncture/Cannulation
Central Line Insertion
Midline Insertion
Arterial Puncture/Cannulation
Arterial Puncture/Cannulation
Urinary Device Placement
Arterial Puncture/Cannulation
Central Line Insertion
Arterial Puncture/Cannulation
Central Line Insertion

## 2025-01-21 NOTE — PROGRESS NOTE ADULT - ASSESSMENT
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 Post-op course c/b worsening mental status and acute hypoxic respiratory failure requiring multiple intubations/extubations, currently extubated (as of 11/26/24) and colonic ileus s/p several rounds of Relistor and Neostigmine with NGT and rectal tube currently in place now with septic shock due to ischemic bowel s/p total colectomy with ostomy creation with MORAIMA on CRRT.      1. s/p OLT 11/15/24 with oliguric MORAIMA in setting of septic shock and poor liver function.   - CT AP non-con: Bilateral renal cysts including cysts with peripheral calcification in the left kidney.  - Initiated on CRRT 12/7/24- 12/15/24 at 1AM stopped. s/p IHD 12/18/24 however required levophed.   - Has been back on CRRT but stopped 1/7/25 due to catheter malfunction. Restarted 1/8/25 at 8PM with new catheter.   - Pt oliguric and hypotensive on vasopressors, continue Nocturnal CRRT (transitioned to nocturnal CRRT 1/14/25) UF 0-50cc/hr as tolerated.   - Monitor labs and I/Os. Avoid nephrotoxins including, ACE/ARB, NSAIDs, contrast, etc. Dose medications as per eGFR.     If you have any questions, please feel free to contact me  Vonnie Schaefer  Nephrology Fellow  [place]/Page [phone]  (After 5pm or on weekends please page the on-call fellow)

## 2025-01-21 NOTE — PROCEDURE NOTE - NSPERFORMEDBY_GEN_A_CORE
Myself
Toy Vogel/PA
Myself
Attending/NP
Myself
Myself

## 2025-01-21 NOTE — ADVANCED PRACTICE NURSE CONSULT - RECOMMEDATIONS
Will recommend:    1. Monitor output.  2. Empty pouch when 1/3-1/2 full   3. Change pouching system every 3-4 days & prn leakage  4. Contact ostomy specialists if questions, concerns/issues .  5. Supplies: [place] 2 1/4" Ceraplus flat skin barrier (#00820), Lianne 2 1/4" drainable pouch (#08029); Accessory products:  stoma paste #[id number], stoma powder (#7682) & Cavilon No sting barrier film wipe (#5109)  Will f/u for ostomy education when appropriate. Pt remains acute, requiring SICU care.    Peristomal skin slough -  Apply Comfeel Plus Transparent dressing to the adherent slough from 5-7 o'clock and to the area of black/brown tissue at 1 o'clock  prior to the application of skin barrier.

## 2025-01-21 NOTE — PROCEDURE NOTE - NSINDICATIONS_GEN_A_CORE
dialysis/CRRT
critical illness/venous access
dialysis/CRRT
arterial puncture to obtain ABG's
blood sampling/critical patient/monitoring purposes
critical illness/dialysis/CRRT
dialysis/CRRT/venous access
monitoring purposes
venous access
critical patient/monitoring purposes
dialysis/CRRT
venous access
altered anatomy
venous access
critical illness/emergency venous access/hemodynamic monitoring/venous access
critical illness/emergency venous access/volume resuscitation
dialysis/CRRT

## 2025-01-21 NOTE — PROCEDURE NOTE - NSANATOMICLLOCATION_GEN_A_CORE
left/femoral artery
right/femoral artery
left/femoral artery
right
left
right/basilic vein
right/basilic vein
left/basilic vein
left/femoral artery

## 2025-01-21 NOTE — PROCEDURE NOTE - PROCEDURE DATE TIME, MLM
23-Dec-2024 14:02
07-Dec-2024
21-Jan-2025 05:25
25-Dec-2024 12:15
19-Dec-2024 18:44
06-Dec-2024 14:15
04-Jan-2025 02:49
01-Jan-2025 15:30
20-Dec-2024 14:20
06-Dec-2024 10:56
26-Nov-2024 16:54
11-Jan-2025 16:37
08-Jan-2025 16:57
21-Jan-2025 05:28
25-Dec-2024 15:20
04-Jan-2025 02:51
21-Jan-2025 07:48
24-Nov-2024 17:15
19-Nov-2024 10:42

## 2025-01-21 NOTE — PROGRESS NOTE ADULT - SUBJECTIVE AND OBJECTIVE BOX
[facility] DIVISION OF KIDNEY DISEASES AND HYPERTENSION -- FOLLOW UP NOTE  --------------------------------------------------------------------------------  Chief Complaint: MORAIMA in OLT transplant    24 hour events/subjective: Pt. seen and examined at bedside earlier today. On vasopressor support, off CRRT during day time.     PAST HISTORY  --------------------------------------------------------------------------------  No significant changes to PMH, PSH, FHx, SHx, unless otherwise noted    ALLERGIES & MEDICATIONS  --------------------------------------------------------------------------------  Allergies    No Known Allergies    Intolerances    Standing Inpatient Medications  buDESOnide    Inhalation Suspension 0.5 milliGRAM(s) Inhalation every 12 hours  chlorhexidine 0.12% Liquid 15 milliLiter(s) Oral Mucosa every 12 hours  chlorhexidine 2% Cloths 1 Application(s) Topical <User Schedule>  collagenase Ointment 1 Application(s) Topical daily  CRRT Treatment    <Continuous>  epoetin tonja (PROCRIT) Injectable 89356 Unit(s) IV Push every 7 days  filgrastim-sndz (ZARXIO) Injectable 480 MICROGram(s) SubCutaneous every 24 hours  ganciclovir IVPB 250 milliGRAM(s) IV Intermittent every 24 hours  insulin lispro (ADMELOG) corrective regimen sliding scale   SubCutaneous every 6 hours  levoFLOXacin IVPB 500 milliGRAM(s) IV Intermittent every 24 hours  melatonin 5 milliGRAM(s) Oral at bedtime  methylPREDNISolone sodium succinate Injectable 16 milliGRAM(s) IV Push daily  metoprolol tartrate Injectable 2.5 milliGRAM(s) IV Push every 6 hours  midodrine 30 milliGRAM(s) Oral every 8 hours  minocycline IVPB 200 milliGRAM(s) IV Intermittent every 12 hours  mupirocin 2% Ointment 1 Application(s) Topical every 12 hours  nystatin Powder 1 Application(s) Topical every 12 hours  pantoprazole  Injectable 40 milliGRAM(s) IV Push every 12 hours  phenylephrine    Infusion 4 MICROgram(s)/kG/Min IV Continuous <Continuous>  Phoxillum Filtration BK 4 / 2.5 5000 milliLiter(s) CRRT <Continuous>  Phoxillum Filtration BK 4 / 2.5 5000 milliLiter(s) CRRT <Continuous>  PrismaSOL Filtration BGK 4 / 2.5 5000 milliLiter(s) CRRT <Continuous>  QUEtiapine 25 milliGRAM(s) Oral at bedtime  romiPLOStim Injectable 100 MICROGram(s) SubCutaneous every 7 days  sodium chloride 3%  Inhalation 4 milliLiter(s) Inhalation every 6 hours  trimethoprim / sulfamethoxazole IVPB 300 milliGRAM(s) IV Intermittent every 8 hours  vasopressin Infusion 0.03 Unit(s)/Min IV Continuous <Continuous>  voriconazole IVPB 200 milliGRAM(s) IV Intermittent every 12 hours    PRN Inpatient Medications  albuterol/ipratropium for Nebulization 3 milliLiter(s) Nebulizer every 6 hours PRN  FIRST- Mouthwash  BLM 10 milliLiter(s) Swish and Spit every 8 hours PRN  HYDROmorphone  Injectable 0.25 milliGRAM(s) IV Push every 3 hours PRN    REVIEW OF SYSTEMS  --------------------------------------------------------------------------------  see above    VITALS/PHYSICAL EXAM  --------------------------------------------------------------------------------  T(C): 37.2 (01-21-25 @ 07:00), Max: 37.7 (01-21-25 @ 03:00)  HR: 100 (01-21-25 @ 07:15) (85 - 112)  BP: 109/56 (01-20-25 @ 20:15) (109/56 - 109/56)  RR: 23 (01-21-25 @ 07:15) (22 - 33)  SpO2: 97% (01-21-25 @ 07:15) (87% - 98%)  Wt(kg): --    01-20-25 @ 07:01  -  01-21-25 @ 07:00  --------------------------------------------------------  IN: 3031.2 mL / OUT: 3206 mL / NET: -174.8 mL    Physical Exam:  Gen: on vent via tracheostomy. opens eyes.  HEENT: Icterus present  Abdomen: + ileostomy with liquid brown stool, VAC dressing present   MSK: +LE edema and UE edema.  Skin: Superficial skin ulceration b/l thighs/dark necrotic appearing scrotum.   Vascular: Right IJ temporary dialysis catheter     LABS/STUDIES  --------------------------------------------------------------------------------              9.9    0.81  >-----------<  13       [01-21-25 @ 06:14]              27.2     135  |  101  |  20  ----------------------------<  111      [01-21-25 @ 06:14]  4.2   |  18  |  <0.30        Ca     8.2     [01-21-25 @ 06:14]      Mg     2.4     [01-21-25 @ 06:14]      Phos  3.4     [01-21-25 @ 06:14]    TPro  3.6  /  Alb  2.8  /  TBili  31.0  /  DBili  x   /  AST  56  /  ALT  24  /  AlkPhos  182  [01-21-25 @ 06:14]    PT/INR: PT 22.3 , INR 1.97       [01-21-25 @ 06:14]  PTT: 55.6       [01-21-25 @ 06:14]    Creatinine Trend:  SCr <0.30 [01-21 @ 06:14]  SCr <0.30 [01-21 @ 00:20]  SCr <0.30 [01-20 @ 18:15]  SCr <0.30 [01-20 @ 12:14]  SCr <0.30 [01-20 @ 05:52]    Iron 76, TIBC Unable to calculate Test Repeated, %sat Unable to calculate Test Repeated      [01-12-25 @ 17:45]  Ferritin 867      [01-12-25 @ 17:45]  Vitamin D (25OH) <6.0      [11-21-24 @ 08:32]  TSH 0.68      [12-12-24 @ 12:27]  Lipid: chol 114, , HDL 47, LDL --   [04-24-24 @ 06:48]

## 2025-01-21 NOTE — PROGRESS NOTE ADULT - SUBJECTIVE AND OBJECTIVE BOX
Transplant Surgery - Multidisciplinary Rounds  --------------------------------------------------------------  OLT   11/15/2024         Colectomy 12/7/2024     IABP 12/7 removed 12/10  Tracheostomy 12/17/2024    Present: Patient seen and examined with multidisciplinary transplant team including Surgeon Dr. Dagher, Hepatologist Dr. Hall, [facility] Cm/Gopi and bedside RN during AM rounds. Disciplines not in attendance will be notified of plan.    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   s/p OLT 11/15 with post op course c/b:  Hypoxia: Reintubated 11/20, extubated 11/24->reintubated 11/24, extubated 11/26, reintubated 12/7, trached 12/17  Ileus   A fib  AMS/Seizure   L brachial DVT  Neutropenia  E coli Bacteremia (12/6)  s/p Colectomy 12/7.   IABP 12/7, removed 12/10  Ec Faecalis UTI (12/16)  Stenotrophamonas in trach aspirate (12/19)  Clostridium in blood 12/23  UGIB 12/26  CMV Viremia  MORAIMA requiring CRRT  Shock liver    Interval/Overnight Events:       Immunosuppression:  - Cyclo per level, MMF HELD, solumed 16  -ongoing monitoring for signs of rejection    TX DATA HERE     ROS: Unable to assess patient is lethargic, s/p tracheostomy    PHYSICAL EXAM:   Constitutional:  awake  Eyes:  PERRLA, Scleral icterus  ENMT: nc/at, no thrush, NGT  Neck: supple, trach   Respiratory: CTA B/L  Cardiovascular: RRR  Gastrointestinal: incision clean/dry/intact + Ostomy red low/no output, wound vac, peritoneal drains x1 bilious   Genitourinary: +scrotal edema w/ large fluid collection; black/green discoloration  Extremities: SCD's in place and working bilaterally, + UE/LE edema  Neurological: opens eyes to voice   Skin: large sacral decub, poor healing with breakdown  Transplant Surgery - Multidisciplinary Rounds  --------------------------------------------------------------  OLT   11/15/2024         Colectomy 12/7/2024     IABP 12/7 removed 12/10  Tracheostomy 12/17/2024    Present: Patient seen and examined with multidisciplinary transplant team including Surgeon Dr. Dagher, Hepatologist Dr. Hall, [facility] Cm/Gopi and bedside RN during AM rounds. Disciplines not in attendance will be notified of plan.    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   s/p OLT 11/15 with post op course c/b:  Hypoxia: Reintubated 11/20, extubated 11/24->reintubated 11/24, extubated 11/26, reintubated 12/7, trached 12/17  Ileus   A fib  AMS/Seizure   L brachial DVT  Neutropenia  E coli Bacteremia (12/6)  s/p Colectomy 12/7.   IABP 12/7, removed 12/10  Ec Faecalis UTI (12/16)  Stenotrophamonas in trach aspirate (12/19)  Clostridium in blood 12/23  UGIB 12/26  CMV Viremia  MORAIMA requiring CRRT  Shock liver    Interval/Overnight Events:   - Trach to vent  - On pressors  - Off CRRT overnight to exchange lines  - 2u Plt    Immunosuppression:  - Cyclo HELD, MMF HELD, solumed 16  -ongoing monitoring for signs of rejection      MEDICATIONS  (STANDING):  buDESOnide    Inhalation Suspension 0.5 milliGRAM(s) Inhalation every 12 hours  chlorhexidine 0.12% Liquid 15 milliLiter(s) Oral Mucosa every 12 hours  chlorhexidine 2% Cloths 1 Application(s) Topical <User Schedule>  collagenase Ointment 1 Application(s) Topical daily  CRRT Treatment    <Continuous>  epoetin tonja (PROCRIT) Injectable 04830 Unit(s) IV Push every 7 days  filgrastim-sndz (ZARXIO) Injectable 480 MICROGram(s) SubCutaneous every 24 hours  ganciclovir IVPB 250 milliGRAM(s) IV Intermittent every 24 hours  insulin lispro (ADMELOG) corrective regimen sliding scale   SubCutaneous every 6 hours  levoFLOXacin IVPB 500 milliGRAM(s) IV Intermittent every 24 hours  melatonin 5 milliGRAM(s) Oral at bedtime  methylPREDNISolone sodium succinate Injectable 16 milliGRAM(s) IV Push daily  metoprolol tartrate Injectable 2.5 milliGRAM(s) IV Push every 6 hours  midodrine 30 milliGRAM(s) Oral every 8 hours  minocycline IVPB 200 milliGRAM(s) IV Intermittent every 12 hours  mupirocin 2% Ointment 1 Application(s) Topical every 12 hours  nystatin Powder 1 Application(s) Topical every 12 hours  pantoprazole  Injectable 40 milliGRAM(s) IV Push every 12 hours  phenylephrine    Infusion 4 MICROgram(s)/kG/Min (74.6 mL/Hr) IV Continuous <Continuous>  Phoxillum Filtration BK 4 / 2.5 5000 milliLiter(s) (1200 mL/Hr) CRRT <Continuous>  Phoxillum Filtration BK 4 / 2.5 5000 milliLiter(s) (1500 mL/Hr) CRRT <Continuous>  PrismaSOL Filtration BGK 4 / 2.5 5000 milliLiter(s) (200 mL/Hr) CRRT <Continuous>  romiPLOStim Injectable 100 MICROGram(s) SubCutaneous every 7 days  sodium chloride 3%  Inhalation 4 milliLiter(s) Inhalation every 6 hours  trimethoprim / sulfamethoxazole IVPB 300 milliGRAM(s) IV Intermittent every 8 hours  vasopressin Infusion 0.03 Unit(s)/Min (4.5 mL/Hr) IV Continuous <Continuous>  voriconazole IVPB 200 milliGRAM(s) IV Intermittent every 12 hours    MEDICATIONS  (PRN):  albuterol/ipratropium for Nebulization 3 milliLiter(s) Nebulizer every 6 hours PRN Shortness of Breath and/or Wheezing  FIRST- Mouthwash  BLM 10 milliLiter(s) Swish and Spit every 8 hours PRN Mouth Care  HYDROmorphone  Injectable 0.25 milliGRAM(s) IV Push every 3 hours PRN Moderate Pain (4 - 6)      PAST MEDICAL & SURGICAL HISTORY:  Diabetes      Transaminitis      Paroxysmal atrial fibrillation      Depression      BPH (benign prostatic hyperplasia)      Hypertension      Chronic atrial fibrillation      Coronary artery disease      Hepatocellular carcinoma      DM (diabetes mellitus)      HTN (hypertension)      Paroxysmal atrial fibrillation      Cirrhosis      HCC (hepatocellular carcinoma)      History of BPH      History of laparoscopic cholecystectomy      History of lumbar laminectomy      H/O prior ablation treatment      H/O percutaneous left heart catheterization          Vital Signs Last 24 Hrs  T(C): 36.9 (21 Jan 2025 11:00), Max: 37.7 (21 Jan 2025 03:00)  T(F): 98.4 (21 Jan 2025 11:00), Max: 99.9 (21 Jan 2025 03:00)  HR: 100 (21 Jan 2025 15:15) (85 - 112)  BP: 109/56 (20 Jan 2025 20:15) (109/56 - 109/56)  BP(mean): 79 (20 Jan 2025 20:15) (79 - 79)  RR: 28 (21 Jan 2025 15:15) (21 - 32)  SpO2: 96% (21 Jan 2025 15:15) (90% - 100%)    Parameters below as of 21 Jan 2025 11:14  Patient On (Oxygen Delivery Method): tracheostomy collar        I&O's Summary    20 Jan 2025 07:01  -  21 Jan 2025 07:00  --------------------------------------------------------  IN: 3031.2 mL / OUT: 3206 mL / NET: -174.8 mL    21 Jan 2025 07:01  -  21 Jan 2025 15:27  --------------------------------------------------------  IN: 1047.7 mL / OUT: 846 mL / NET: 201.7 mL                              9.9    0.81  )-----------( 13       ( 21 Jan 2025 06:14 )             27.2     01-21    135  |  102  |  20  ----------------------------<  117[H]  4.3   |  20[L]  |  <0.30[L]    Ca    8.1[L]      21 Jan 2025 12:27  Phos  3.6     01-21  Mg     2.5     01-21    TPro  3.5[L]  /  Alb  2.7[L]  /  TBili  29.7[H]  /  DBili  x   /  AST  54[H]  /  ALT  26  /  AlkPhos  175[H]  01-21          Culture - Bronchial (collected 01-19-25 @ 12:51)  Source: Combi-Cath  Gram Stain (01-19-25 @ 22:24):    No polymorphonuclear leukocytes per low power field    No Squamous epithelial cells per low power field    Rare Yeast like cells per oil power field  Preliminary Report (01-20-25 @ 11:01):    Commensal charity consistent with body site    Culture - Blood (collected 01-18-25 @ 15:45)  Source: .Blood BLOOD  Gram Stain (01-20-25 @ 04:05):    Growth in aerobic bottle: Gram Negative Rods  Preliminary Report (01-20-25 @ 18:36):    Growth in aerobic bottle: Stenotrophomonas maltophilia    See previous culture [id number]    Culture - Blood (collected 01-18-25 @ 15:43)  Source: .Blood BLOOD  Gram Stain (01-20-25 @ 03:25):    Growth in aerobic bottle: Gram Negative Rods  Preliminary Report (01-20-25 @ 18:36):    Growth in aerobic bottle: Stenotrophomonas maltophilia    Direct identification is available within approximately 3-5    hours either by Blood Panel Multiplexed PCR or Direct    MALDI-TOF. Details: [email or web address]  Organism: Blood Culture PCR (01-20-25 @ 07:03)  Organism: Blood Culture PCR (01-20-25 @ 07:03)    Culture - Fungal, Blood (collected 01-17-25 @ 02:36)  Source: .Blood Blood  Gram Stain (01-20-25 @ 10:04):    Growth in Myco/F Lytic Bottle: Gram Negative Rods  Preliminary Report (01-21-25 @ 10:30):    Growth in Myco/F Lytic Bottle: Stenotrophomonas maltophilia          ROS: Unable to assess patient is lethargic, s/p tracheostomy    PHYSICAL EXAM:   Constitutional:  awake  Eyes:  PERRLA, Scleral icterus  ENMT: nc/at, no thrush, NGT  Neck: supple, trach   Respiratory: CTA B/L  Cardiovascular: RRR  Gastrointestinal: incision clean/dry/intact + Ostomy red low/no output, wound vac, peritoneal drains x1 bilious   Genitourinary: +scrotal edema w/ large fluid collection; black/green discoloration  Extremities: SCD's in place and working bilaterally, + UE/LE edema  Neurological: opens eyes to voice   Skin: large sacral decub, poor healing with breakdown

## 2025-01-21 NOTE — PROGRESS NOTE ADULT - ASSESSMENT
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, cultures have since cleared  - Tx ID following - on Imipenem/Levo/Bactrim DS/Voriconazole/Nitin  - 1/1 CT CAP: small pleural effusions, enteritis, rest ok  - all lines changed over 1/3  - Wound care for sacral decubitus ulcer  - CT chest/abd/pelvis 1/11 showed apical cavitating lesions likely fungal ID switched Caspo to Voriconazole  - 1/13 Blood Cx + Stenotrophomonas, 1/18 Cx - growing gram - rods  - neupogen PRN for low WBC     [] MORAIMA:   - CRRT started 12/7, stopped 12/15, resumed CRRT 12/23, now on nocturnal CRRT negative 50cc/hr  - still anuric     [ ] UGIB s/p EGD (12/26) showing generalized oozing, coagulopathy  - Correct coagulopathy per SICU  - Protonix  - TF at goal  -nplate started q7days for thrombocytopenia    [] s/p OLT  - poor graft function, trial of plasmapheresis 1/3, 1/5, 1/7 for (3-5 days), PLEX #4 1/9,   - IVIG #1 on 1/8, #2 1/9   - PLEX and IVIG held 1/11 +1/12  - repeat liver US unchanged  - Diet: restart trickle feeds  - Pain management: avoid narcotics  - Strict I&Os, wound vac placed 12/10   - US: L brachial DVT (holding A/C)  - wound vac care  - ursodiol 300mgBID     [ ] Immunosuppression  - Tacrolimus stopped 11/18 in setting of AMS/Seizure, Started Cyclo 12/17, now held for worsening functional status   - Cyclo held, MMF HELD, Solumedrol 16 mg daily    [] lleus   -@ home on Linzess  - imaging with distended colon, improving, (likely opioid induced)  - s/p Neostigmine x 2  - s/p Relistor, last dose 12/1  - s/p colectomy with end ileostomy  (see above)  - low ileostomy output post lomotil, restarted trickle feeds  - Colorectal following  - replace losses as able  - Ileostomy output increasing (now 75cc/24 from 30), melanotic stool    [] CMV viremia  - CMV PCR (12/11 and 12/16): neg, positive CMV PCR on 12/23  - CMV viral load incr from 537 to 1240 1/6, at 1280 on 1/13, ganciclovir    [ ] AMS  - Reintubated 11/20 Aspiration/hypoxia, extubated 11/24->[id number]/24--> extubated 11/26 -> [id number]/7 -> tracheostomy 12/17 -> trach collar trials starting 12/29  - EEG 11/30 negative, Neurology following  - BH following   - off tacro  -CT Head No Cont (12/20): Age-indeterminate posterior left frontal lobe infarct.   -CT Head (12/25): Evolving subacute infarct in the left supramarginal gyrus  -repeat CTH ok, poor MS   - melatonin QHS     [ ] HTN/ pAFib  [ ] coronary vasospasm vs posterior wall MI  - remains off all a/c, failed hep gtt/argatroban due to UGIB  - Cards- plan for PCI prior to DC   - Off Amiodarone, off dobutamine  - BB as needed  - Dr. 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, cultures have since cleared  - Tx ID following - on Levo/Bactrim DS/Voriconazole/Nitin  - 1/1 CT CAP: small pleural effusions, enteritis, rest ok  - all lines changed over 1/3  - Wound care for sacral decubitus ulcer  - CT chest/abd/pelvis 1/11 showed apical cavitating lesions likely fungal ID switched Caspo to Voriconazole  - 1/13 Blood Cx + Stenotrophomonas, 1/18 Cx - growing gram - rods  - neupogen PRN for low WBC   - Wound Vac change 1/21    [] MORAIMA:   - CRRT started 12/7, stopped 12/15, resumed CRRT 12/23, now on nocturnal CRRT negative 50cc/hr  - CRRT stopped overnight for line exchange, prev. clotting machine 1/21  - still anuric     [ ] UGIB s/p EGD (12/26) showing generalized oozing, coagulopathy  - Correct coagulopathy per SICU  - Protonix  - TF at goal  -nplate started q7days for thrombocytopenia    [] s/p OLT  - poor graft function, trial of plasmapheresis 1/3, 1/5, 1/7 for (3-5 days), PLEX #4 1/9,   - IVIG #1 on 1/8, #2 1/9   - PLEX and IVIG held 1/11 +1/12  - repeat liver US unchanged  - Diet: NPO, low/no ostomy output  - Pain management: avoid narcotics  - Strict I&Os, wound vac placed 12/10   - US: L brachial DVT (holding A/C)  - wound vac care  - ursodiol 300mgBID     [ ] Immunosuppression  - Tacrolimus stopped 11/18 in setting of AMS/Seizure, Started Cyclo 12/17, now held for worsening functional status   - Cyclo held, MMF HELD, Solumedrol 16 mg daily    [] lleus   -@ home on Linzess  - imaging with distended colon, improving, (likely opioid induced)  - s/p Neostigmine x 2  - s/p Relistor, last dose 12/1  - s/p colectomy with end ileostomy  (see above)  - low ileostomy output post lomotil, no tube feeds  - Colorectal following  - replace losses as able  - Ileostomy output increasing (now 75cc/24 from 30), melanotic stool    [] CMV viremia  - CMV PCR (12/11 and 12/16): neg, positive CMV PCR on 12/23  - CMV viral load incr from 537 to 1240 1/6, at 1280 on 1/13, ganciclovir, at 721 on 1/21    [ ] AMS  - Reintubated 11/20 Aspiration/hypoxia, extubated 11/24->[id number]/24--> extubated 11/26 -> [id number]/7 -> tracheostomy 12/17 -> trach collar trials starting 12/29  - EEG 11/30 negative, Neurology following  - BH following   - off tacro  -CT Head No Cont (12/20): Age-indeterminate posterior left frontal lobe infarct.   -CT Head (12/25): Evolving subacute infarct in the left supramarginal gyrus  -repeat CTH ok, poor MS   - melatonin QHS     [ ] HTN/ pAFib  [ ] coronary vasospasm vs posterior wall MI  - remains off all a/c, failed hep gtt/argatroban due to UGIB  - Cards- plan for PCI prior to DC   - Off Amiodarone, off dobutamine  - BB as needed  - Dr. Quintanilla following    [ ] DM  - per SICU     [] Scrotal abscess   - US (12/12): 2.1 x0.9 x 3.2 cm focal, right testicle- possible abscess (12/12)  - Urology recs: CT scrotum review no debridement required  - Urology recs Derm consult for guidance on wound care   - 12/23 US doppler scrotum: no arterial flow, limited venous flow, bl hydrocele  - 12/15 CT CAP no acute changes   - Elevate scrotum w/ support Urology consult appreciated

## 2025-01-21 NOTE — CHART NOTE - NSCHARTNOTEFT_GEN_A_CORE
Chart reviewed, Spoke with SICU ACP. SICU to arrange for IDT family meeting. Palliative available this Wed-Fri 10A-3P for family meetings. Please notify palliative team for time/day of meeting. Can be reached by TEAMS M-F [phone] Radha Hernandez Any other time please page [phone] if needed

## 2025-01-21 NOTE — PROCEDURE NOTE - NSSITEPREP_SKIN_A_CORE
chlorhexidine
povidone iodine (if allergic to chlorhexidine)
chlorhexidine

## 2025-01-21 NOTE — PROGRESS NOTE ADULT - ASSESSMENT
74 year old male with PMH of DM, HTN, pAfib s/p ablation 2018 (no AC 2/2 thrombocytopenia), CAD, depression, anxiety, BPH, likely GONZALES liver cirrhosis with portal htn (splenomegaly, recanalized paraumbilical vein, paraoesophageal and trea splenic varices), and with HCC found on 9/11/23 MRI, 1.8 cm seg 5 LR-5 HCC and a 3-4 cm seg 8 LR 4 HCC, s/p Y90 Sept, 2023 initially admitted for liver transplant now s/p  OLT on 11/15/24. Post op course complicated by acute hypoxic respiratory failure requiring intubation multiple times, most recently on 12/7 with conversion to tracheostomy on 12/17, e.coli bacteremia with RTOR on 12/7 for concern for worsening septic shock and cardiogenic shock s/p balloon pump placement and total abdominal colectomy in setting of ischemic bowel s/p OR on 12/9 for ileostomy creation, and olirugirc MORAIMA requiring CRRT and now intermittent HD.    Clinical course complicated by numerous infectious complications including Stenotrophomonas pneumonia, Enterococcus  faecalis isolated on abdominal drains and Clostridium paraputrificum bacteremia. Also with development of wounds including the sacrum and sloughing of scrotum in setting of loss of blood flow to the testes. Most recently with new cavitary pneumonia with coinciding Stenotrophomonas bacteremia.      BCx (1/11/25) Stenotrophomonas  Trach Culture (1/12/25) Stenotrophomonas  Fungitell 1/13 >500  Aspergillus galactomannan (1/12) 0.02    CT Chest (1/11/25) Cavitary left apical lesions measuring 1.9 cm and 1.5 cm in the region of previously groundglass opacity. Branching nodular opacities in the left upper lobe.     Cavitary pneumonia could be secondary to atypical pathogen although its also possible it is secondary to Stenotrophomonas    #Septic Shock, Cavitary pneumonia, Stenotrophomonas Bacteremia (recurrent)  --Pending susceptibilities of new Stenotrophomonas isolate added Minocycline 200 mg BID  --Recommend exchanging central lines  --Recommend repeat CT Chest to reevaluate cavitary lung lesions. Would also pursue CT A/P  --Recommend sending 2x repeat blood cultures  --Continue IV Bactrim and IV Levofloxacin pending repeat CT Chest  --Hold Voriconazole 200 mg IV Q12H  --Follow up on Voriconazole trough was 7.7    #Pancytopenia  Likely multifactorial etiology  --If CT Chest without worsening cavitation, may be able to switch Bactrim to minocycline and maintain Levofloxacin for double coverage.     #CMV Viremia  CMV resistance testing sent on 1/14  may need to switch to foscarnet for suspected resistance  received a dose of IVIG for hypogammaglobulinemia  --Restarted IV ganciclovir in induction dosing adjusted for CRRT  --Repeat next CMV PCR on 1/20/25    #Positive Blood Culture (12/23 Clostridium paraputrificum)   Clostridium paraputrificum is generally penicillin and metronidazole susceptible  --s/p Metronidazole and Imipenem course    #Liver Transplant Recipient, Prophylactic Antibiotic  --On ganciclovir already as above  --On bactrim already as above    prognosis remains very poor with multiple infections despite minimal immunosuppressive medications     Wes aCrrero MD  Can be called via Teams  After 5pm/weekends [phone]      The above assessment and plan were discussed with SICU Team  74 year old male with PMH of DM, HTN, pAfib s/p ablation 2018 (no AC 2/2 thrombocytopenia), CAD, depression, anxiety, BPH, 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 Post op course complicated by acute hypoxic respiratory failure requiring intubation multiple times, most recently on 12/7 with conversion to tracheostomy on 12/17, e.coli bacteremia with RTOR on 12/7 for concern for worsening septic shock and cardiogenic shock s/p balloon pump placement and total abdominal colectomy in setting of ischemic bowel s/p OR on 12/9 for ileostomy creation, and olirugirc MORAIMA requiring CRRT and now intermittent HD.    Clinical course complicated by numerous infectious complications including Stenotrophomonas pneumonia, Enterococcus  faecalis isolated on abdominal drains and Clostridium paraputrificum bacteremia. Also with development of wounds including the sacrum and sloughing of scrotum in setting of loss of blood flow to the testes. Most recently with new cavitary pneumonia with coinciding Stenotrophomonas bacteremia.      BCx (1/11/25) Stenotrophomonas  Trach Culture (1/12/25) Stenotrophomonas  Fungitell 1/13 >500  Aspergillus galactomannan (1/12) 0.02    CT Chest (1/11/25) Cavitary left apical lesions measuring 1.9 cm and 1.5 cm in the region of previously groundglass opacity. Branching nodular opacities in the left upper lobe.     Cavitary pneumonia could be secondary to atypical pathogen although its also possible it is secondary to Stenotrophomonas    #Septic Shock, Cavitary pneumonia, Stenotrophomonas Bacteremia (recurrent)  --Pending susceptibilities of new Stenotrophomonas isolate added Minocycline 200 mg BID  --Recommend exchanging central lines  --Recommend repeat CT Chest to reevaluate cavitary lung lesions. Would also pursue CT A/P  --Recommend sending 2x repeat blood cultures  --Continue IV Bactrim and IV Levofloxacin pending repeat CT Chest  --Hold Voriconazole 200 mg IV Q12H  --Follow up on Voriconazole trough was 7.7  --Will ask if Cefiderocol is an option for the stenotrophomonas    #Pancytopenia  Likely multifactorial etiology  --If CT Chest without worsening cavitation, may be able to switch Bactrim to minocycline and maintain Levofloxacin for double coverage.     #CMV Viremia  CMV resistance testing sent on 1/14  may need to switch to foscarnet for suspected resistance  received a dose of IVIG for hypogammaglobulinemia  --Restarted IV ganciclovir in induction dosing adjusted for CRRT  --Repeat next CMV PCR on 1/20/25    #Positive Blood Culture (12/23 Clostridium paraputrificum)   Clostridium paraputrificum is generally penicillin and metronidazole susceptible  --s/p Metronidazole and Imipenem course    #Liver Transplant Recipient, Prophylactic Antibiotic  --On ganciclovir already as above  --On bactrim already as above    prognosis remains very poor with multiple infections despite minimal immunosuppressive medications     Wes Carrero MD  Can be called via Teams  After 5pm/weekends [phone]      The above assessment and plan were discussed with SICU Team

## 2025-01-21 NOTE — PROGRESS NOTE ADULT - SUBJECTIVE AND OBJECTIVE BOX
INTERVAL EVENTS:  - Filgrastim daily (end 01/25)  - TF 20   - CCRT -50  went for net even due to increased pressure support req  - f/u combicath fungal culture 	  - Cult gram - rods  - hold on NPH   - repeat BCx  - Hold line changes until platelet >20 as per shared decision making conversation with family      NEURO  Exam: grimaces to pain, tracks  Meds: HYDROmorphone  Injectable 0.25 milliGRAM(s) IV Push every 3 hours PRN Moderate Pain (4 - 6)  melatonin 5 milliGRAM(s) Oral at bedtime  QUEtiapine 25 milliGRAM(s) Oral at bedtime      RESPIRATORY  RR: 27 (01-21-25 @ 01:15) (18 - 33)  SpO2: 97% (01-21-25 @ 01:15) (87% - 98%)  Wt(kg): --  Exam: unlabored, clear to auscultation bilaterally  Mechanical Ventilation: Mode: AC/ CMV (Assist Control/ Continuous Mandatory Ventilation), RR (machine): 14, RR (patient): 29, TV (machine): 470, FiO2: 40, PEEP: 5, ITime: 1, MAP: 9, PIP: 16  ABG - ( 21 Jan 2025 00:01 )  pH: 7.39  /  pCO2: 33    /  pO2: 130   / HCO3: 20    / Base Excess: -4.3  /  SaO2: 100.0   Lactate: x                Meds: albuterol/ipratropium for Nebulization 3 milliLiter(s) Nebulizer every 6 hours PRN Shortness of Breath and/or Wheezing  buDESOnide    Inhalation Suspension 0.5 milliGRAM(s) Inhalation every 12 hours  sodium chloride 3%  Inhalation 4 milliLiter(s) Inhalation every 6 hours      CARDIOVASCULAR  HR: 101 (01-21-25 @ 01:15) (84 - 107)  BP: 109/56 (01-20-25 @ 20:15) (109/56 - 109/56)  BP(mean): 79 (01-20-25 @ 20:15) (79 - 79)  ABP: 103/48 (01-21-25 @ 01:15) (84/50 - 118/57)  ABP(mean): 71 (01-21-25 @ 01:15) (59 - 83)  Wt(kg): --  CVP(cm H2O): --      Exam: regular rate and rhythm  Cardiac Rhythm: sinus  Perfusion     [x]Adequate   [ ]Inadequate  Mentation   [x]Normal       [ ]Reduced  Extremities  [x]Warm         [ ]Cool  Volume Status [ ]Hypervolemic [x]Euvolemic [ ]Hypovolemic  Meds: metoprolol tartrate Injectable 2.5 milliGRAM(s) IV Push every 6 hours  midodrine 30 milliGRAM(s) Oral every 8 hours  phenylephrine    Infusion 4 MICROgram(s)/kG/Min IV Continuous <Continuous>      GI/NUTRITION  Exam: soft, nontender, nondistended  Diet:  trickle feeds  Meds: pantoprazole  Injectable 40 milliGRAM(s) IV Push every 12 hours      GENITOURINARY  I&O's Detail    01-19 @ 07:01 - 01-20 @ 07:00  --------------------------------------------------------  IN:    Albumin 25%  -  50 mL: 150 mL    Enteral Tube Flush: 100 mL    IV PiggyBack: 1450 mL    Phenylephrine: 576.6 mL    Platelets - Single Donor: 450 mL    PRBCs (Packed Red Blood Cells): 600 mL    Vasopressin: 108 mL    Vital1.5: 430 mL  Total IN: 3864.6 mL    OUT:    Bulb (mL): 1400 mL    Ileostomy (mL): 25 mL    Indwelling Catheter - Urethral (mL): 10 mL    Other (mL): 2382 mL    VAC (Vacuum Assisted Closure) System (mL): 200 mL  Total OUT: 4017 mL    Total NET: -152.4 mL      01-20 @ 07:01 - 01-21 @ 01:56  --------------------------------------------------------  IN:    Enteral Tube Flush: 100 mL    IV PiggyBack: 450 mL    IV PiggyBack: 569 mL    IV PiggyBack: 100 mL    Phenylephrine: 242.6 mL    Platelets - Single Donor: 225 mL    Vasopressin: 81 mL    Vital1.5: 360 mL  Total IN: 2127.6 mL    OUT:    Bulb (mL): 25 mL    Ileostomy (mL): 20 mL    Indwelling Catheter - Urethral (mL): 10 mL    Other (mL): 2785 mL    VAC (Vacuum Assisted Closure) System (mL): 0 mL  Total OUT: 2840 mL    Total NET: -712.4 mL          01-21    134[L]  |  101  |  18  ----------------------------<  139[H]  4.1   |  18[L]  |  <0.30[L]    Ca    8.1[L]      21 Jan 2025 00:20  Phos  3.3     01-21  Mg     2.4     01-21    TPro  3.6[L]  /  Alb  2.9[L]  /  TBili  30.2[H]  /  DBili  x   /  AST  53[H]  /  ALT  23  /  AlkPhos  177[H]  01-21    Meds:     HEMATOLOGIC  Meds: romiPLOStim Injectable 100 MICROGram(s) SubCutaneous every 7 days                          9.4    0.82  )-----------( 7        ( 20 Jan 2025 23:33 )             26.4     PT/INR - ( 20 Jan 2025 05:52 )   PT: 25.4 sec;   INR: 2.23 ratio         PTT - ( 20 Jan 2025 05:52 )  PTT:66.2 sec    INFECTIOUS DISEASES  T(C): 37.6 (01-20-25 @ 23:00), Max: 37.6 (01-20-25 @ 23:00)  Wt(kg): --  WBC Count: 0.82 K/uL (01-20 @ 23:33)  WBC Count: 0.68 K/uL (01-20 @ 18:15)  WBC Count: 0.65 K/uL (01-20 @ 05:52)    Recent Cultures:  Specimen Source: Combi-Cath, 01-19 @ 12:51; Results   Commensal charity consistent with body site[!]; Gram Stain:   No polymorphonuclear leukocytes per low power field  No Squamous epithelial cells per low power field  Rare Yeast like cells per oil power field[!]; Organism: --  Specimen Source: .Blood BLOOD, 01-18 @ 15:45; Results   Growth in aerobic bottle: Stenotrophomonas maltophilia  See previous culture [id number][!]; Gram Stain:   Growth in aerobic bottle: Gram Negative Rods[!]; Organism: --  Specimen Source: .Blood BLOOD, 01-18 @ 15:43; Results   Growth in aerobic bottle: Stenotrophomonas maltophilia  Direct identification is available within approximately 3-5  hours either by Blood Panel Multiplexed PCR or Direct  MALDI-TOF. Details: [email or web address]]; Gram Stain:   Growth in aerobic bottle: Gram Negative Rods[!]; Organism: Blood Culture PCR[!]  Specimen Source: .Blood Blood, 01-17 @ 02:36; Results   Growth in Myco/F Lytic Bottle: Gram Negative Rods[!]; Gram Stain:   Growth in Myco/F Lytic Bottle: Gram Negative Rods[!]; Organism: --    Meds: epoetin tonja (PROCRIT) Injectable 74802 Unit(s) IV Push every 7 days  filgrastim-sndz (ZARXIO) Injectable 480 MICROGram(s) SubCutaneous every 24 hours  ganciclovir IVPB 250 milliGRAM(s) IV Intermittent every 24 hours  levoFLOXacin IVPB 500 milliGRAM(s) IV Intermittent every 24 hours  minocycline IVPB 200 milliGRAM(s) IV Intermittent every 12 hours  trimethoprim / sulfamethoxazole IVPB 300 milliGRAM(s) IV Intermittent every 8 hours  voriconazole IVPB 200 milliGRAM(s) IV Intermittent every 12 hours      ENDOCRINE  Capillary Blood Glucose    Meds: insulin lispro (ADMELOG) corrective regimen sliding scale   SubCutaneous every 6 hours  methylPREDNISolone sodium succinate Injectable 16 milliGRAM(s) IV Push daily  vasopressin Infusion 0.03 Unit(s)/Min IV Continuous <Continuous>        OTHER MEDICATIONS:  chlorhexidine 0.12% Liquid 15 milliLiter(s) Oral Mucosa every 12 hours  chlorhexidine 2% Cloths 1 Application(s) Topical <User Schedule>  collagenase Ointment 1 Application(s) Topical daily  CRRT Treatment    <Continuous>  FIRST- Mouthwash  BLM 10 milliLiter(s) Swish and Spit every 8 hours PRN  mupirocin 2% Ointment 1 Application(s) Topical every 12 hours  nystatin Powder 1 Application(s) Topical every 12 hours  Phoxillum Filtration BK 4 / 2.5 5000 milliLiter(s) CRRT <Continuous>  Phoxillum Filtration BK 4 / 2.5 5000 milliLiter(s) CRRT <Continuous>  PrismaSOL Filtration BGK 4 / 2.5 5000 milliLiter(s) CRRT <Continuous>      IMAGING:

## 2025-01-21 NOTE — PROCEDURE NOTE - ESTIMATED BLOOD LOSS
None
Minimal
None
Minimal
Minimal
None
Minimal
None
Minimal
None
Minimal
None
Minimal
Minimal

## 2025-01-21 NOTE — PROGRESS NOTE ADULT - ASSESSMENT
74 male w/ a PMHx of HTN, DM, AF, BPH, MASH cirrhosis and HCC s/p OLT on 11/15. Case was uneventful but post-op course has been prolonged and c/b delirium, aspiration, multiple episodes of acute hypoxic respiratory failure requiring reintubation from 11/20-11/24 & 11/24-11/26, AF w/ RVR, ileus requiring NGT, distended colon requiring rectal tube, dysphagia, malnutrition, hypernatremia, fevers, pancytopenia, poorly controlled glucoses, and left brachial VTE. Patient went into severe refractory septic shock on 12/6 w/ blood cultures positive for E. coli s/p s/p ex lap, total abd colectomy, end ileostomy, 3 intentionally retained laparotomy pads for bleeding, Abthera, IABP placement w/ admission to CTICU, Patient required inotropes, Jacqui, and CRRT post-op. s/p closure 12/9. IABP removed 12/10 and transferred back to SICU 12/13. SICU course c/b narrow complex arrythmia w/ ECG showing new ST elevations concerning for posterior wall MI vs vasospasms, cards consulted and started on heparin gtt for empiric ACS tx which was c/b GIB then transitioned to argatroban c/b bleed. TTE revealed LVOT obstruction & TODD.       Neuro:  - Pain: PRN dilaudid  - seroquel and melatonin at bedtime  - Opens eyes intermittently, intermittently following commands, off sedation (more awake 1/13)  - CT head 11/19, 11/21, 11/25, 11/29, 12/5, 12/20, 1/11 negative  - EEG: Mild diffuse cerebral dysfunction that is not specific in etiology. No epileptic discharges recorded. No seizures recorded.  - Holding home xanax, Abilify, Zoloft, Remeron, Lexapro  - No need for MRI    Resp:  - S/p bedside tracheostomy 12/17  - full support iso pulmonary vascular congestion on CXR, unable to tolerate trach collar 1/18.  - c/w chest PT  - c/w inhaled bronchodilator  - c/w suctioning PRN    CV:  - on kassandra, vaso  - midodrine 30mg q8  - IV metoprolol 2.5 mg q6  - IABP removed 12/10  - TTE 12/6 w/ LVOT obstruction & TODD  - TTE 12/11 shows EF of 55-60%    GI:  - trend LFTs  - Tube feeds at 20cc/hr   - protonix BID  - monitor ALYSSA output  - monitor ileostomy output  - CT 1/11: no obstruction, enteritis, foci of air concerning for early SBO  - holding cyclosporine    /Renal:  - CRRT net even  - 50cc 25% albumin q6 for 24 hrs  - Monitor BUN/Cr, I&Os, trend UOP  - Monitor scrotal necrosis, maintain nichols at all times  - Bladder scan q12    Heme:  - Filgastrim QD (end 01/25)  - trend H/H, PLTs, coags; Plt goal 20   - hep gtt and argatroban gtt held i/s/o bleed  - PLEX x4, last session 1/9    ID:  - ABX: Voriconazole, levaquin, bactrim, minocycline   - Ganciclovir for CMV   - holding immunosuppresion 2/2 cavitary lesion on CT chest  - 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

## 2025-01-21 NOTE — CHART NOTE - NSCHARTNOTEFT_GEN_A_CORE
Spoke with SICU ACP. Goals are established. No indication for palliative consult at this time. Please reconsult as needed. Can be reached by TEAMS M-F 9-5 Radha Hernandez Any other time please page [phone] if needed

## 2025-01-21 NOTE — PROCEDURE NOTE - NSPROCDETAILS_GEN_ALL_CORE
guidewire recovered/lumen(s) aspirated and flushed/sterile dressing applied/sterile technique, catheter placed/ultrasound guidance with use of sterile gel and probe cove
location identified, draped/prepped, sterile technique used/ultrasound guidance
location identified, draped/prepped, sterile technique used, needle inserted/introduced/positive blood return obtained via catheter/connected to a pressurized flush line/sutured in place/hemostasis with direct pressure, dressing applied/all materials/supplies accounted for at end of procedure
location identified, draped/prepped, sterile technique used, needle inserted/introduced/positive blood return obtained via catheter/connected to a pressurized flush line/sutured in place/hemostasis with direct pressure, dressing applied/Seldinger technique/all materials/supplies accounted for at end of procedure
location identified, draped/prepped, sterile technique used/sterile dressing applied/sterile technique, catheter placed/supine position/ultrasound guidance
guidewire recovered/lumen(s) aspirated and flushed/sterile dressing applied/sterile technique, catheter placed
guidewire recovered/lumen(s) aspirated and flushed/sterile dressing applied/sterile technique, catheter placed/ultrasound guidance with use of sterile gel and probe cove
location identified, draped/prepped, sterile technique used, needle inserted/introduced/positive blood return obtained via catheter/connected to a pressurized flush line/sutured in place/Seldinger technique/all materials/supplies accounted for at end of procedure
location identified, draped/prepped, sterile technique used/sterile dressing applied/ultrasound guidance
sterile technique, indwelling urinary device inserted
guidewire recovered/lumen(s) aspirated and flushed/sterile dressing applied/sterile technique, catheter placed/ultrasound guidance with use of sterile gel and probe cove
guidewire recovered/lumen(s) aspirated and flushed/ultrasound guidance with use of sterile gel and probe cove
guidewire recovered/lumen(s) aspirated and flushed/sterile dressing applied/sterile technique, catheter placed/ultrasound guidance with use of sterile gel and probe cove
guidewire recovered/lumen(s) aspirated and flushed/sterile dressing applied/sterile technique, catheter placed/ultrasound guidance with use of sterile gel and probe cove
guidewire recovered/lumen(s) aspirated and flushed/sterile dressing applied/sterile technique, catheter placed

## 2025-01-21 NOTE — PROCEDURE NOTE - NSCVLATTEMPTSITEVASC_A_CORE
right
left/internal jugular
right/internal jugular
left/internal jugular
right/internal jugular
right

## 2025-01-21 NOTE — PROCEDURE NOTE - ADDITIONAL PROCEDURE DETAILS
Exchanged over a wire from prior trialysis catheter
exchanged over a wire from prior arterial line
exchanged over a wire from prior midline site
Complex catheter placement with foreskin manipulation and stretching    Urology contacted regarding diverting patient's urine leakage from healing scrotal skin, decision made to place 16fr catheter that required manipulation of foreskin and stretching to allow passage of nichols catheter, making this a complex catheter placement.
A total of 4 attempts to obtain arterial line placement were performed. First attempt was done at the right radial artery. The right wrist was prepped and draped in usual manner and needle was inserted into the right radial artery under ultrasound guidance. No blood return occurred despite confirmation of needle in radial artery. Placement in right radial artery was aborted.    Due to reported history by patient's son who is a vascular surgeon of decreased arterial flow in the left arm, placement of arterial line in left radial artery was deferred for the second attempt. Ultrasound of right femoral did not demonstrate favorable anatomy. For second attempt, left femoral artery was attempted. The left groin was prepped and draped. Under ultrasound guidance, needle was placed in left femoral artery with brisk blood return. Guidewire was difficult to advance and catheter did not advance into lumen of left femoral artery. Placement was aborted.    For third attempt, left radial artery was visualized with ultrasound. Site was prepped and draped. Under ultrasound guidance, successful cannulation of the left radial artery was performed but blood return was slow, arterial pressure tracing was poor (no dicrotic notch) and not correlating with non-invasive blood pressure monitor.    A fourth attempt was performed with decision to reattempt left femoral artery with plan of making incision slightly larger than normal, more distal site than previous, and to be attempted by Dr. Vital. Left groin site was prepped and draped. Successful cannulation of left femoral artery was performed under ultrasound guidance - brisk blood return, correlation with non-invasive blood pressure monitor, and physiologic arterial pressure tracing. Blood loss was minimal despite multiple attempts at different sites.
Patient's R IJ hemodialysis catheter (shiley) was exchanged for a trialysis catheter over a guidewire in the setting of increased pressor requirements. Shiley catheter, ports, and surrounding area was thoroughly sanitized with chlorhexidine. Guidewire passed through the distal lumen of shiley catheter and into IJ. Shiley removed and exchanged for a trialysis catheter over the wire, with subsequent wire removal. Lumens aspirated and flushed. Sterile dressing applied.

## 2025-01-21 NOTE — PROGRESS NOTE ADULT - SUBJECTIVE AND OBJECTIVE BOX
INFECTIOUS DISEASES FOLLOW UP-- Renée Carrero  [phone]    This is a follow up note for this  74yMale with  Status post liver transplantation  prolonged hospitalization with multiple infections post op        ROS:  CONSTITUTIONAL:  Non interactive    Allergies    No Known Allergies    Intolerances        ANTIBIOTICS/RELEVANT:  antimicrobials  ganciclovir IVPB 250 milliGRAM(s) IV Intermittent every 24 hours  levoFLOXacin IVPB 500 milliGRAM(s) IV Intermittent every 24 hours  minocycline IVPB 200 milliGRAM(s) IV Intermittent every 12 hours  trimethoprim / sulfamethoxazole IVPB 300 milliGRAM(s) IV Intermittent every 8 hours    immunologic:  epoetin tonja (PROCRIT) Injectable 79363 Unit(s) IV Push every 7 days  filgrastim-sndz (ZARXIO) Injectable 480 MICROGram(s) SubCutaneous every 24 hours    OTHER:  albuterol/ipratropium for Nebulization 3 milliLiter(s) Nebulizer every 6 hours PRN  buDESOnide    Inhalation Suspension 0.5 milliGRAM(s) Inhalation every 12 hours  chlorhexidine 0.12% Liquid 15 milliLiter(s) Oral Mucosa every 12 hours  chlorhexidine 2% Cloths 1 Application(s) Topical <User Schedule>  collagenase Ointment 1 Application(s) Topical daily  CRRT Treatment    <Continuous>  FIRST- Mouthwash  BLM 10 milliLiter(s) Swish and Spit every 8 hours PRN  HYDROmorphone  Injectable 0.25 milliGRAM(s) IV Push every 3 hours PRN  insulin lispro (ADMELOG) corrective regimen sliding scale   SubCutaneous every 6 hours  melatonin 5 milliGRAM(s) Oral at bedtime  methylPREDNISolone sodium succinate Injectable 16 milliGRAM(s) IV Push daily  metoprolol tartrate Injectable 2.5 milliGRAM(s) IV Push every 6 hours  midodrine 30 milliGRAM(s) Oral every 8 hours  mupirocin 2% Ointment 1 Application(s) Topical every 12 hours  nystatin Powder 1 Application(s) Topical every 12 hours  pantoprazole  Injectable 40 milliGRAM(s) IV Push every 12 hours  phenylephrine    Infusion 4 MICROgram(s)/kG/Min IV Continuous <Continuous>  Phoxillum Filtration BK 4 / 2.5 5000 milliLiter(s) CRRT <Continuous>  Phoxillum Filtration BK 4 / 2.5 5000 milliLiter(s) CRRT <Continuous>  PrismaSOL Filtration BGK 4 / 2.5 5000 milliLiter(s) CRRT <Continuous>  romiPLOStim Injectable 100 MICROGram(s) SubCutaneous every 7 days  sodium chloride 3%  Inhalation 4 milliLiter(s) Inhalation every 6 hours  vasopressin Infusion 0.03 Unit(s)/Min IV Continuous <Continuous>      Objective:  Vital Signs Last 24 Hrs  T(C): 36.7 (21 Jan 2025 15:00), Max: 37.7 (21 Jan 2025 03:00)  T(F): 98.1 (21 Jan 2025 15:00), Max: 99.9 (21 Jan 2025 03:00)  HR: 96 (21 Jan 2025 16:30) (89 - 112)  BP: 109/56 (20 Jan 2025 20:15) (109/56 - 109/56)  BP(mean): 79 (20 Jan 2025 20:15) (79 - 79)  RR: 26 (21 Jan 2025 16:30) (21 - 32)  SpO2: 100% (21 Jan 2025 16:30) (90% - 100%)    Parameters below as of 21 Jan 2025 11:14  Patient On (Oxygen Delivery Method): tracheostomy collar        PHYSICAL EXAM:  Constitutional:non interactive  Eyes:DUSTIN, EOMI  Ear/Nose/Throat: no oral lesions, trach	  Respiratory: clear BL  Cardiovascular: S1S2  Gastrointestinal:  Extremities:no e/e/c  No Lymphadenopathy  IV sites not inflammed.    LABS:                        9.9    0.81  )-----------( 13       ( 21 Jan 2025 06:14 )             27.2     01-21    135  |  102  |  20  ----------------------------<  117[H]  4.3   |  20[L]  |  <0.30[L]    Ca    8.1[L]      21 Jan 2025 12:27  Phos  3.6     01-21  Mg     2.5     01-21    TPro  3.5[L]  /  Alb  2.7[L]  /  TBili  29.7[H]  /  DBili  x   /  AST  54[H]  /  ALT  26  /  AlkPhos  175[H]  01-21    PT/INR - ( 21 Jan 2025 06:14 )   PT: 22.3 sec;   INR: 1.97 ratio         PTT - ( 21 Jan 2025 06:14 )  PTT:55.6 sec  Urinalysis Basic - ( 21 Jan 2025 12:27 )    Color: x / Appearance: x / SG: x / pH: x  Gluc: 117 mg/dL / Ketone: x  / Bili: x / Urobili: x   Blood: x / Protein: x / Nitrite: x   Leuk Esterase: x / RBC: x / WBC x   Sq Epi: x / Non Sq Epi: x / Bacteria: x        MICROBIOLOGY:            RECENT CULTURES:  01-19 @ 12:51  Combi-Cath  --  --  --    Commensal charity consistent with body site  --  01-18 @ 15:45  .Blood BLOOD  --  --  --    Growth in aerobic bottle: Stenotrophomonas maltophilia  See previous culture [id number]  --  01-18 @ 15:43  .Blood BLOOD  Blood Culture PCR  Stenotrophomonas maltophilia  Blood Culture PCR  PCR    Growth in aerobic bottle: Stenotrophomonas maltophilia  Direct identification is available within approximately 3-5  hours either by Blood Panel Multiplexed PCR or Direct  MALDI-TOF. Details: [email or web address]  --  01-17 @ 02:36  .Blood Blood  --  --  --    Growth in Myco/F Lytic Bottle: Stenotrophomonas maltophilia  --      RADIOLOGY & ADDITIONAL STUDIES:    < from: Xray Chest 1 View- PORTABLE-Urgent (Xray Chest 1 View- PORTABLE-Urgent .) (01.21.25 @ 05:50) >  IMPRESSION:  Support devices as described above.    --- End of Report ---      < end of copied text >   INFECTIOUS DISEASES FOLLOW UP-- Renée Carrero  [phone]    This is a follow up note for this  74yMale with  Status post liver transplantation  prolonged hospitalization with multiple infections post op        ROS:  CONSTITUTIONAL:  Non interactive    Allergies    No Known Allergies    Intolerances        ANTIBIOTICS/RELEVANT:  antimicrobials  ganciclovir IVPB 250 milliGRAM(s) IV Intermittent every 24 hours  levoFLOXacin IVPB 500 milliGRAM(s) IV Intermittent every 24 hours  minocycline IVPB 200 milliGRAM(s) IV Intermittent every 12 hours  trimethoprim / sulfamethoxazole IVPB 300 milliGRAM(s) IV Intermittent every 8 hours    immunologic:  epoetin tonja (PROCRIT) Injectable 10362 Unit(s) IV Push every 7 days  filgrastim-sndz (ZARXIO) Injectable 480 MICROGram(s) SubCutaneous every 24 hours    OTHER:  albuterol/ipratropium for Nebulization 3 milliLiter(s) Nebulizer every 6 hours PRN  buDESOnide    Inhalation Suspension 0.5 milliGRAM(s) Inhalation every 12 hours  chlorhexidine 0.12% Liquid 15 milliLiter(s) Oral Mucosa every 12 hours  chlorhexidine 2% Cloths 1 Application(s) Topical <User Schedule>  collagenase Ointment 1 Application(s) Topical daily  CRRT Treatment    <Continuous>  FIRST- Mouthwash  BLM 10 milliLiter(s) Swish and Spit every 8 hours PRN  HYDROmorphone  Injectable 0.25 milliGRAM(s) IV Push every 3 hours PRN  insulin lispro (ADMELOG) corrective regimen sliding scale   SubCutaneous every 6 hours  melatonin 5 milliGRAM(s) Oral at bedtime  methylPREDNISolone sodium succinate Injectable 16 milliGRAM(s) IV Push daily  metoprolol tartrate Injectable 2.5 milliGRAM(s) IV Push every 6 hours  midodrine 30 milliGRAM(s) Oral every 8 hours  mupirocin 2% Ointment 1 Application(s) Topical every 12 hours  nystatin Powder 1 Application(s) Topical every 12 hours  pantoprazole  Injectable 40 milliGRAM(s) IV Push every 12 hours  phenylephrine    Infusion 4 MICROgram(s)/kG/Min IV Continuous <Continuous>  Phoxillum Filtration BK 4 / 2.5 5000 milliLiter(s) CRRT <Continuous>  Phoxillum Filtration BK 4 / 2.5 5000 milliLiter(s) CRRT <Continuous>  PrismaSOL Filtration BGK 4 / 2.5 5000 milliLiter(s) CRRT <Continuous>  romiPLOStim Injectable 100 MICROGram(s) SubCutaneous every 7 days  sodium chloride 3%  Inhalation 4 milliLiter(s) Inhalation every 6 hours  vasopressin Infusion 0.03 Unit(s)/Min IV Continuous <Continuous>      Objective:  Vital Signs Last 24 Hrs  T(C): 36.7 (21 Jan 2025 15:00), Max: 37.7 (21 Jan 2025 03:00)  T(F): 98.1 (21 Jan 2025 15:00), Max: 99.9 (21 Jan 2025 03:00)  HR: 96 (21 Jan 2025 16:30) (89 - 112)  BP: 109/56 (20 Jan 2025 20:15) (109/56 - 109/56)  BP(mean): 79 (20 Jan 2025 20:15) (79 - 79)  RR: 26 (21 Jan 2025 16:30) (21 - 32)  SpO2: 100% (21 Jan 2025 16:30) (90% - 100%)    Parameters below as of 21 Jan 2025 11:14  Patient On (Oxygen Delivery Method): tracheostomy collar        PHYSICAL EXAM:  Constitutional:non interactive  Eyes:DUSTIN, EOMI  Ear/Nose/Throat: no oral lesions, trach	  Respiratory: clear BL  Cardiovascular: S1S2  Gastrointestinal: pale appearing ostomy  scrotal area discolored/pale  Extremities:no e/e/c  No Lymphadenopathy  IV sites not inflammed.    LABS:                        9.9    0.81  )-----------( 13       ( 21 Jan 2025 06:14 )             27.2     01-21    135  |  102  |  20  ----------------------------<  117[H]  4.3   |  20[L]  |  <0.30[L]    Ca    8.1[L]      21 Jan 2025 12:27  Phos  3.6     01-21  Mg     2.5     01-21    TPro  3.5[L]  /  Alb  2.7[L]  /  TBili  29.7[H]  /  DBili  x   /  AST  54[H]  /  ALT  26  /  AlkPhos  175[H]  01-21    PT/INR - ( 21 Jan 2025 06:14 )   PT: 22.3 sec;   INR: 1.97 ratio         PTT - ( 21 Jan 2025 06:14 )  PTT:55.6 sec  Urinalysis Basic - ( 21 Jan 2025 12:27 )    Color: x / Appearance: x / SG: x / pH: x  Gluc: 117 mg/dL / Ketone: x  / Bili: x / Urobili: x   Blood: x / Protein: x / Nitrite: x   Leuk Esterase: x / RBC: x / WBC x   Sq Epi: x / Non Sq Epi: x / Bacteria: x        MICROBIOLOGY:            RECENT CULTURES:  01-19 @ 12:51  Combi-Cath  --  --  --    Commensal charity consistent with body site  --  01-18 @ 15:45  .Blood BLOOD  --  --  --    Growth in aerobic bottle: Stenotrophomonas maltophilia  See previous culture [id number]  --  01-18 @ 15:43  .Blood BLOOD  Blood Culture PCR  Stenotrophomonas maltophilia  Blood Culture PCR  PCR    Growth in aerobic bottle: Stenotrophomonas maltophilia  Direct identification is available within approximately 3-5  hours either by Blood Panel Multiplexed PCR or Direct  MALDI-TOF. Details: [email or web address]  --  01-17 @ 02:36  .Blood Blood  --  --  --    Growth in Myco/F Lytic Bottle: Stenotrophomonas maltophilia  --      RADIOLOGY & ADDITIONAL STUDIES:    < from: Xray Chest 1 View- PORTABLE-Urgent (Xray Chest 1 View- PORTABLE-Urgent .) (01.21.25 @ 05:50) >  IMPRESSION:  Support devices as described above.    --- End of Report ---      < end of copied text >

## 2025-01-21 NOTE — ADVANCED PRACTICE NURSE CONSULT - APN SPECIALTY LIST
Wound Ostomy Care

## 2025-01-22 NOTE — PROGRESS NOTE ADULT - SUBJECTIVE AND OBJECTIVE BOX
[facility] DIVISION OF KIDNEY DISEASES AND HYPERTENSION -- FOLLOW UP NOTE  --------------------------------------------------------------------------------  Chief Complaint: MORAIMA in OLT transplant    24 hour events/subjective: Pt. seen and examined at bedside earlier today. On CRRT and IV vasopressor, well tolerated. Per RN no issues with CRRT/catheter. No UOP.    PAST HISTORY  --------------------------------------------------------------------------------  No significant changes to PMH, PSH, FHx, SHx, unless otherwise noted    ALLERGIES & MEDICATIONS  --------------------------------------------------------------------------------  Allergies    No Known Allergies    Intolerances    Standing Inpatient Medications  albumin human 25% IVPB 50 milliLiter(s) IV Intermittent every 6 hours  buDESOnide    Inhalation Suspension 0.5 milliGRAM(s) Inhalation every 12 hours  cefiderocol IVPB 1500 milliGRAM(s) IV Intermittent every 8 hours  chlorhexidine 0.12% Liquid 15 milliLiter(s) Oral Mucosa every 12 hours  chlorhexidine 2% Cloths 1 Application(s) Topical <User Schedule>  collagenase Ointment 1 Application(s) Topical daily  CRRT Treatment    <Continuous>  epoetin tonja (PROCRIT) Injectable 21963 Unit(s) IV Push every 7 days  filgrastim-sndz (ZARXIO) Injectable 480 MICROGram(s) SubCutaneous every 24 hours  ganciclovir IVPB 250 milliGRAM(s) IV Intermittent every 24 hours  insulin lispro (ADMELOG) corrective regimen sliding scale   SubCutaneous every 6 hours  melatonin 5 milliGRAM(s) Oral at bedtime  metoprolol tartrate Injectable 2.5 milliGRAM(s) IV Push every 6 hours  midodrine 30 milliGRAM(s) Oral every 8 hours  minocycline IVPB 200 milliGRAM(s) IV Intermittent every 12 hours  mirtazapine 7.5 milliGRAM(s) Oral at bedtime  mupirocin 2% Ointment 1 Application(s) Topical every 12 hours  norepinephrine Infusion 0.05 MICROgram(s)/kG/Min IV Continuous <Continuous>  nystatin Powder 1 Application(s) Topical every 12 hours  pantoprazole  Injectable 40 milliGRAM(s) IV Push every 12 hours  phenylephrine    Infusion 4 MICROgram(s)/kG/Min IV Continuous <Continuous>  Phoxillum Filtration BK 4 / 2.5 5000 milliLiter(s) CRRT <Continuous>  Phoxillum Filtration BK 4 / 2.5 5000 milliLiter(s) CRRT <Continuous>  PrismaSOL Filtration BGK 4 / 2.5 5000 milliLiter(s) CRRT <Continuous>  romiPLOStim Injectable 100 MICROGram(s) SubCutaneous every 7 days  vasopressin Infusion 0.03 Unit(s)/Min IV Continuous <Continuous>    PRN Inpatient Medications  albuterol/ipratropium for Nebulization 3 milliLiter(s) Nebulizer every 6 hours PRN  FIRST- Mouthwash  BLM 10 milliLiter(s) Swish and Spit every 8 hours PRN  HYDROmorphone  Injectable 0.25 milliGRAM(s) IV Push every 3 hours PRN    REVIEW OF SYSTEMS  --------------------------------------------------------------------------------  see above    VITALS/PHYSICAL EXAM  --------------------------------------------------------------------------------  T(C): 37.4 (01-22-25 @ 11:00), Max: 37.4 (01-22-25 @ 03:00)  HR: 98 (01-22-25 @ 14:00) (91 - 105)  BP: 101/52 (01-22-25 @ 08:30) (101/52 - 101/52)  RR: 20 (01-22-25 @ 14:00) (18 - 50)  SpO2: 100% (01-22-25 @ 14:00) (94% - 100%)  Wt(kg): --    Weight (kg): 100.5 (01-21-25 @ 20:59)    01-21-25 @ 07:01  -  01-22-25 @ 07:00  --------------------------------------------------------  IN: 3629.6 mL / OUT: 3294 mL / NET: 335.6 mL    01-22-25 @ 07:01  -  01-22-25 @ 14:02  --------------------------------------------------------  IN: 950.5 mL / OUT: 1038 mL / NET: -87.5 mL    Physical Exam:  Gen: on vent via tracheostomy. opens eyes.  HEENT: Icterus present  Abdomen: + ileostomy with liquid brown stool, VAC dressing present   MSK: +LE edema and UE edema.  Skin: Superficial skin ulceration b/l thighs/dark necrotic appearing scrotum.   Vascular: Right IJ temporary dialysis catheter connected to CRRT     LABS/STUDIES  --------------------------------------------------------------------------------              8.9    0.56  >-----------<  7        [01-22-25 @ 05:56]              24.8     135  |  102  |  19  ----------------------------<  104      [01-22-25 @ 13:17]  4.2   |  19  |  <0.30        Ca     8.5     [01-22-25 @ 13:17]      Mg     2.5     [01-22-25 @ 13:17]      Phos  3.6     [01-22-25 @ 13:17]    TPro  3.8  /  Alb  3.1  /  TBili  x   /  DBili  x   /  AST  47  /  ALT  25  /  AlkPhos  153  [01-22-25 @ 13:17]    PT/INR: PT 25.8 , INR 2.26       [01-22-25 @ 05:56]  PTT: 59.5       [01-22-25 @ 05:56]    Creatinine Trend:  SCr <0.30 [01-22 @ 13:17]  SCr <0.30 [01-22 @ 05:56]  SCr <0.30 [01-22 @ 00:35]  SCr <0.30 [01-21 @ 18:18]  SCr <0.30 [01-21 @ 12:27]    Iron 76, TIBC Unable to calculate Test Repeated, %sat Unable to calculate Test Repeated      [01-12-25 @ 17:45]  Ferritin 867      [01-12-25 @ 17:45]  Vitamin D (25OH) <6.0      [11-21-24 @ 08:32]  TSH 0.68      [12-12-24 @ 12:27]  Lipid: chol 114, , HDL 47, LDL --      [04-24-24 @ 06:48]

## 2025-01-22 NOTE — PROGRESS NOTE ADULT - ASSESSMENT
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 Post-op course c/b worsening mental status and acute hypoxic respiratory failure requiring multiple intubations/extubations, currently extubated (as of 11/26/24) and colonic ileus s/p several rounds of Relistor and Neostigmine with NGT and rectal tube currently in place now with septic shock due to ischemic bowel s/p total colectomy with ostomy creation with MORAIMA on CRRT.      1. s/p OLT 11/15/24 with oliguric MORAIMA in setting of septic shock and poor liver function.   - CT AP non-con: Bilateral renal cysts including cysts with peripheral calcification in the left kidney.  - Initiated on CRRT 12/7/24- 12/15/24 at 1AM stopped. s/p IHD 12/18/24 however required levophed.   - Has been back on CRRT but stopped 1/7/25 due to catheter malfunction. Restarted 1/8/25 at 8PM with new catheter. Was on nocturnal CRRT 1/14-1/18. Now back on regular CRRT.   - Pt oliguric and hypotensive on vasopressors, continue CRRT  UF 0-50cc/hr as tolerated.   - Monitor labs and I/Os. Avoid nephrotoxins including, ACE/ARB, NSAIDs, contrast, etc. Dose medications as per eGFR.     If you have any questions, please feel free to contact me  Vonnie Schaefer  Nephrology Fellow  [employer]/Page [phone]  (After 5pm or on weekends please page the on-call fellow)

## 2025-01-22 NOTE — PROGRESS NOTE ADULT - ASSESSMENT
74 male w/ a PMHx of HTN, DM, AF, BPH, MASH cirrhosis and HCC s/p OLT on 11/15. Case was uneventful but post-op course has been prolonged and c/b delirium, aspiration, multiple episodes of acute hypoxic respiratory failure requiring reintubation from 11/20-11/24 & 11/24-11/26, AF w/ RVR, ileus requiring NGT, distended colon requiring rectal tube, dysphagia, malnutrition, hypernatremia, fevers, pancytopenia, poorly controlled glucoses, and left brachial VTE. Patient went into severe refractory septic shock on 12/6 w/ blood cultures positive for E. coli s/p s/p ex lap, total abd colectomy, end ileostomy, 3 intentionally retained laparotomy pads for bleeding, Abthera, IABP placement w/ admission to CTICU, Patient required inotropes, Jacqui, and CRRT post-op. s/p closure 12/9. IABP removed 12/10 and transferred back to SICU 12/13. SICU course c/b narrow complex arrythmia w/ ECG showing new ST elevations concerning for posterior wall MI vs vasospasms, cards consulted and started on heparin gtt for empiric ACS tx which was c/b GIB then transitioned to argatroban c/b bleed. TTE revealed LVOT obstruction & TODD.       Neuro:  - Pain: PRN dilaudid  - melatonin at bedtime/ dc seroquel  - Opens eyes intermittently, intermittently following commands, off sedation (more awake 1/13)  - CT head 11/19, 11/21, 11/25, 11/29, 12/5, 12/20, 1/11 negative  - EEG: Mild diffuse cerebral dysfunction that is not specific in etiology. No epileptic discharges recorded. No seizures recorded.  - Holding home xanax, Abilify, Zoloft, Remeron, Lexapro  - No need for MRI    Resp:  - S/p bedside tracheostomy 12/17  - full support iso pulmonary vascular congestion on CXR, unable to tolerate trach collar 1/18.  - c/w chest PT  - c/w inhaled bronchodilator  - c/w suctioning PRN    CV:  - on kassandra, vaso  - midodrine 30mg q8  - IV metoprolol 2.5 mg q6  - IABP removed 12/10  - TTE 12/6 w/ LVOT obstruction & TODD  - TTE 12/11 shows EF of 55-60%    GI:  - trend LFTs  - Tube feeds at 20cc/hr   - protonix BID  - monitor ALYSSA output  - monitor ileostomy output  - CT 1/11: no obstruction, enteritis, foci of air concerning for early SBO  - holding cyclosporine    /Renal:  - CRRT net even  - 50cc 25% albumin q6 for 24 hrs  - Monitor BUN/Cr, I&Os, trend UOP  - Monitor scrotal necrosis, maintain nichols at all times  - Bladder scan q12    Heme:  - Filgastrim QD (end 01/25)  - trend H/H, PLTs, coags; Plt goal 20   - hep gtt and argatroban gtt held i/s/o bleed  - PLEX x4, last session 1/9    ID:  - ABX: Voriconazole, levaquin, bactrim, minocycline   - Ganciclovir for CMV   - holding immunosuppresion 2/2 cavitary lesion on CT chest  - 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

## 2025-01-22 NOTE — PROGRESS NOTE ADULT - ASSESSMENT
IMPRESSION:  1) STAGE III LUNG CANCER  2) RADIATION ESOPHAGITIS (INFLAMMATION OF ESOPHAGUS FROM RADIATION)  3) RADIATION INJURY TO LUNG  4) DIZZINESS.  5) mouth sores  6) neuropathy      PLAN:   1) FINISH CHEMOTHERAPY   2) PET SCAN IN JAN 2025   3) REPEAT MRI OF BRAIN FEB 2025   4) stay on antiviral/cold sore (valacylovir)         ) if any concerns call:   # constipation, nausea,vomiting, fevers, mouth sores (Call !))     ) baking soda/salt water   # 1/2 tsp of salt, and 1/2 tsp of baking soda in quart of water, swish /spit.   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, cultures have since cleared  - Tx ID following - on Levo/Bactrim DS/Voriconazole/Nitin  - 1/1 CT CAP: small pleural effusions, enteritis, rest ok  - all lines changed over 1/3  - Wound care for sacral decubitus ulcer  - CT chest/abd/pelvis 1/11 showed apical cavitating lesions likely fungal ID switched Caspo to Voriconazole  - 1/13 Blood Cx + Stenotrophomonas, 1/18 Cx - growing gram - rods  - neupogen PRN for low WBC   - Wound Vac change 1/21    [] MORAIMA:   - CRRT started 12/7, stopped 12/15, resumed CRRT 12/23, now on nocturnal CRRT negative 50cc/hr  - CRRT stopped overnight for line exchange, prev. clotting machine 1/21  - still anuric     [ ] UGIB s/p EGD (12/26) showing generalized oozing, coagulopathy  - Correct coagulopathy per SICU  - Protonix  - TF at goal  -nplate started q7days for thrombocytopenia    [] s/p OLT  - poor graft function, trial of plasmapheresis 1/3, 1/5, 1/7 for (3-5 days), PLEX #4 1/9,   - IVIG #1 on 1/8, #2 1/9   - PLEX and IVIG held 1/11 +1/12  - repeat liver US unchanged  - Diet: NPO, low/no ostomy output  - Pain management: avoid narcotics  - Strict I&Os, wound vac placed 12/10   - US: L brachial DVT (holding A/C)  - wound vac care  - ursodiol 300mgBID     [ ] Immunosuppression  - Tacrolimus stopped 11/18 in setting of AMS/Seizure, Started Cyclo 12/17, now held for worsening functional status   - Cyclo held, MMF HELD, Solumedrol 16 mg daily    [] lleus   -@ home on Linzess  - imaging with distended colon, improving, (likely opioid induced)  - s/p Neostigmine x 2  - s/p Relistor, last dose 12/1  - s/p colectomy with end ileostomy  (see above)  - low ileostomy output post lomotil, no tube feeds  - Colorectal following  - replace losses as able  - Ileostomy output increasing (now 75cc/24 from 30), melanotic stool    [] CMV viremia  - CMV PCR (12/11 and 12/16): neg, positive CMV PCR on 12/23  - CMV viral load incr from 537 to 1240 1/6, at 1280 on 1/13, ganciclovir, at 721 on 1/21    [ ] AMS  - Reintubated 11/20 Aspiration/hypoxia, extubated 11/24->[id number]/24--> extubated 11/26 -> [id number]/7 -> tracheostomy 12/17 -> trach collar trials starting 12/29  - EEG 11/30 negative, Neurology following  - BH following   - off tacro  -CT Head No Cont (12/20): Age-indeterminate posterior left frontal lobe infarct.   -CT Head (12/25): Evolving subacute infarct in the left supramarginal gyrus  -repeat CTH ok, poor MS   - melatonin QHS     [ ] HTN/ pAFib  [ ] coronary vasospasm vs posterior wall MI  - remains off all a/c, failed hep gtt/argatroban due to UGIB  - Cards- plan for PCI prior to DC   - Off Amiodarone, off dobutamine  - BB as needed  - Dr. 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, cultures have since cleared  - Tx ID following - dc Levaquin and bactrim, start cefiderocal per ID, cont with minocycline  - 1/1 CT CAP: small pleural effusions, enteritis, rest ok  - all lines changed over 1/3  - Wound care for sacral decubitus ulcer  - CT chest/abd/pelvis 1/11 showed apical cavitating lesions likely fungal ID switched Caspo to Voriconazole  - 1/13 Blood Cx + Stenotrophomonas, 1/18 Cx - growing gram - rods  - neupogen PRN for low WBC   - Wound Vac change 1/21    [] MORAIMA:   - CRRT started 12/7, stopped 12/15, resumed CRRT 12/23, now on nocturnal CRRT negative 50cc/hr  - CRRT stopped overnight for line exchange, prev. clotting machine 1/21  - still anuric     [ ] UGIB s/p EGD (12/26) showing generalized oozing, coagulopathy  - Correct coagulopathy per SICU  - Protonix  - TF at goal  -nplate started q7days for thrombocytopenia    [] s/p OLT  - poor graft function, trial of plasmapheresis 1/3, 1/5, 1/7 for (3-5 days), PLEX #4 1/9,   - IVIG #1 on 1/8, #2 1/9   - PLEX and IVIG held 1/11 +1/12  - repeat liver US unchanged  - Diet: NPO, low/no ostomy output  - Pain management: avoid narcotics  - Strict I&Os, wound vac placed 12/10   - US: L brachial DVT (holding A/C)  - wound vac care  - ursodiol 300mgBID     [ ] Immunosuppression  - Tacrolimus stopped 11/18 in setting of AMS/Seizure, Started Cyclo 12/17, now held for worsening functional status   - Cyclo held, MMF HELD, Solumedrol decr to 8 mg daily    [] lleus   -@ home on Linzess  - imaging with distended colon, improving, (likely opioid induced)  - s/p Neostigmine x 2  - s/p Relistor, last dose 12/1  - s/p colectomy with end ileostomy  (see above)  - low ileostomy output post lomotil, no tube feeds  - Colorectal following  - replace losses as able  - Ileostomy output increasing (now 75cc/24 from 30), melanotic stool    [] CMV viremia  - CMV PCR (12/11 and 12/16): neg, positive CMV PCR on 12/23  - CMV viral load incr from 537 to 1240 1/6, at 1280 on 1/13, ganciclovir, at 721 on 1/21    [ ] AMS  - Reintubated 11/20 Aspiration/hypoxia, extubated 11/24->[id number]/24--> extubated 11/26 -> ikagvumspwb37/7 -> tracheostomy 12/17 -> trach collar trials starting 12/29  - EEG 11/30 negative, Neurology following  - BH following   - off tacro  -CT Head No Cont (12/20): Age-indeterminate posterior left frontal lobe infarct.   -CT Head (12/25): Evolving subacute infarct in the left supramarginal gyrus  -repeat CTH ok, poor MS   - melatonin QHS     [ ] HTN/ pAFib  [ ] coronary vasospasm vs posterior wall MI  - remains off all a/c, failed hep gtt/argatroban due to UGIB  - Cards- plan for PCI prior to DC   - Off Amiodarone, off dobutamine  - BB as needed  - Dr. Quintanilla following    [ ] DM  - per SICU     [] Scrotal abscess   - US (12/12): 2.1 x0.9 x 3.2 cm focal, right testicle- possible abscess (12/12)  - Urology recs: CT scrotum review no debridement required  - Urology recs Derm consult for guidance on wound care   - 12/23 US doppler scrotum: no arterial flow, limited venous flow, bl hydrocele  - 12/15 CT CAP no acute changes   - Elevate scrotum w/ support Urology consult appreciated

## 2025-01-22 NOTE — PROGRESS NOTE ADULT - SUBJECTIVE AND OBJECTIVE BOX
INFECTIOUS DISEASES FOLLOW UP-- Renée Carrero  [phone]    This is a follow up note for this  74yMale with  Status post liver transplantation    Blood cultures again positive for stenotrophomonas despite broad abx    ROS:  CONSTITUTIONAL: frail, trachcollar, non interactive    Allergies    No Known Allergies    Intolerances        ANTIBIOTICS/RELEVANT:  antimicrobials  cefiderocol IVPB 1500 milliGRAM(s) IV Intermittent every 8 hours  ganciclovir IVPB 250 milliGRAM(s) IV Intermittent every 24 hours  minocycline IVPB 200 milliGRAM(s) IV Intermittent every 12 hours    immunologic:  epoetin tonja (PROCRIT) Injectable 57336 Unit(s) IV Push every 7 days  filgrastim-sndz (ZARXIO) Injectable 480 MICROGram(s) SubCutaneous every 24 hours    OTHER:  albumin human 25% IVPB 50 milliLiter(s) IV Intermittent every 6 hours  albuterol/ipratropium for Nebulization 3 milliLiter(s) Nebulizer every 6 hours PRN  buDESOnide    Inhalation Suspension 0.5 milliGRAM(s) Inhalation every 12 hours  chlorhexidine 0.12% Liquid 15 milliLiter(s) Oral Mucosa every 12 hours  chlorhexidine 2% Cloths 1 Application(s) Topical <User Schedule>  collagenase Ointment 1 Application(s) Topical daily  CRRT Treatment    <Continuous>  FIRST- Mouthwash  BLM 10 milliLiter(s) Swish and Spit every 8 hours PRN  HYDROmorphone  Injectable 0.25 milliGRAM(s) IV Push every 3 hours PRN  insulin lispro (ADMELOG) corrective regimen sliding scale   SubCutaneous every 6 hours  melatonin 5 milliGRAM(s) Oral at bedtime  metoprolol tartrate Injectable 2.5 milliGRAM(s) IV Push every 6 hours  midodrine 30 milliGRAM(s) Oral every 8 hours  mirtazapine 7.5 milliGRAM(s) Oral at bedtime  mupirocin 2% Ointment 1 Application(s) Topical every 12 hours  norepinephrine Infusion 0.05 MICROgram(s)/kG/Min IV Continuous <Continuous>  nystatin Powder 1 Application(s) Topical every 12 hours  pantoprazole  Injectable 40 milliGRAM(s) IV Push every 12 hours  phenylephrine    Infusion 4 MICROgram(s)/kG/Min IV Continuous <Continuous>  Phoxillum Filtration BK 4 / 2.5 5000 milliLiter(s) CRRT <Continuous>  Phoxillum Filtration BK 4 / 2.5 5000 milliLiter(s) CRRT <Continuous>  PrismaSOL Filtration BGK 4 / 2.5 5000 milliLiter(s) CRRT <Continuous>  romiPLOStim Injectable 100 MICROGram(s) SubCutaneous every 7 days  vasopressin Infusion 0.03 Unit(s)/Min IV Continuous <Continuous>      Objective:  Vital Signs Last 24 Hrs  T(C): 37.8 (22 Jan 2025 15:00), Max: 37.8 (22 Jan 2025 15:00)  T(F): 100 (22 Jan 2025 15:00), Max: 100 (22 Jan 2025 15:00)  HR: 92 (22 Jan 2025 17:00) (91 - 105)  BP: 101/52 (22 Jan 2025 08:30) (101/52 - 101/52)  BP(mean): 72 (22 Jan 2025 08:30) (72 - 72)  RR: 16 (22 Jan 2025 17:00) (15 - 50)  SpO2: 100% (22 Jan 2025 17:00) (96% - 100%)    Parameters below as of 22 Jan 2025 15:00  Patient On (Oxygen Delivery Method): ventilator    O2 Concentration (%): 40    PHYSICAL EXAM:  Constitutional:no clinical improvement  Eyes:DUSTIN, son states he tracks with his eyes  Ear/Nose/Throat: trach collar	  Respiratory: coarse  Cardiovascular: tachy  Gastrointestinal: cachectic, ostomy with pale tip  Extremities:no e/e/c  No Lymphadenopathy  IV sites not inflammed changed on 1/20    LABS:                        8.9    0.56  )-----------( 7        ( 22 Jan 2025 05:56 )             24.8     01-22    135  |  102  |  19  ----------------------------<  104[H]  4.2   |  19[L]  |  <0.30[L]    Ca    8.5      22 Jan 2025 13:17  Phos  3.6     01-22  Mg     2.5     01-22    TPro  3.8[L]  /  Alb  3.1[L]  /  TBili  30.7[H]  /  DBili  x   /  AST  47[H]  /  ALT  25  /  AlkPhos  153[H]  01-22    PT/INR - ( 22 Jan 2025 05:56 )   PT: 25.8 sec;   INR: 2.26 ratio         PTT - ( 22 Jan 2025 05:56 )  PTT:59.5 sec  Urinalysis Basic - ( 22 Jan 2025 13:17 )    Color: x / Appearance: x / SG: x / pH: x  Gluc: 104 mg/dL / Ketone: x  / Bili: x / Urobili: x   Blood: x / Protein: x / Nitrite: x   Leuk Esterase: x / RBC: x / WBC x   Sq Epi: x / Non Sq Epi: x / Bacteria: x        MICROBIOLOGY:            RECENT CULTURES:  01-21 @ 08:57  .Blood BLOOD  --  --  --    Growth in aerobic bottle: Gram Negative Rods  --  01-21 @ 08:27  .Blood BLOOD  --  --  --    Growth in anaerobic bottle: Gram Negative Rods  Growth in aerobic bottle: Gram Negative Rods  --  01-19 @ 12:51  Combi-Cath  Stenotrophomonas maltophilia  Stenotrophomonas maltophilia  ESAU    Moderate Stenotrophomonas maltophilia  Commensal charity consistent with body site  --  01-18 @ 15:45  .Blood BLOOD  --  --  --    Growth in aerobic bottle: Stenotrophomonas maltophilia  See previous culture [id number]  --  01-18 @ 15:43  .Blood BLOOD  Blood Culture PCR  Stenotrophomonas maltophilia  Blood Culture PCR  PCR    Growth in aerobic bottle: Stenotrophomonas maltophilia  Direct identification is available within approximately 3-5  hours either by Blood Panel Multiplexed PCR or Direct  MALDI-TOF. Details: [email or web address]  --  01-17 @ 02:36  .Blood Blood  Stenotrophomonas maltophilia  Stenotrophomonas maltophilia  ESAU    Growth in Myco/F Lytic Bottle: Stenotrophomonas maltophilia  --      RADIOLOGY & ADDITIONAL STUDIES:    < from: Xray Chest 1 View- PORTABLE-Urgent (Xray Chest 1 View- PORTABLE-Urgent .) (01.21.25 @ 05:50) >  FINDINGS:    Interval placement of right IJ dialysis catheter with tip overlying the   SVC. Tracheostomy. Enteric tube overlies the stomach.  Hazy right lower lobe opacity. Small left pleural effusion. No   pneumothorax.  The heart size cannot be accurately assessed on this projection.  No acute osseous abnormalities. Partially visualized upper cervical spine   surgical hardware.    IMPRESSION:  Support devices as described above.    < end of copied text >

## 2025-01-22 NOTE — PHARMACOTHERAPY INTERVENTION NOTE - COMMENTS
STERLING BRYANT, 74y Male with Stenotrophomonas maltophila bacteremia, has now been persistent despite triple therapy with bactrim/minocycline/levofloxacin. Patient remains critically ill and pressor requirements also increasing.    Recommendation(s):  Given the patient's clinical status and persistence in bacteremia, would suggest changing antimicrobial regimen for the S. maltophila to cefiderocol 1.5 grams IV Q8H (over 3 hour infusion) and c/w minocycline 200 mg Q12H. Would suggest D/C bactrim and levofloxacin.    Cefiderocol dosing is based on the active CVVHDF order which says the approximate clearance while on this renal replacement therapy is 48 mL/min.    With kind regards,  Baudilio Aguila, RandyD, BCIDP  Infectious Diseases Clinical Pharmacist  Available on [employer] Teams  .

## 2025-01-22 NOTE — PROGRESS NOTE ADULT - SUBJECTIVE AND OBJECTIVE BOX
INTERVAL EVENTS:  - Filgrastim daily (end 01/25)  - TF 10 after emesis    - CCRT -net even  - f/u combicath fungal culture 	  - Cult gram - rods  - repeat BCx  - lines changed out  - 2 u platelets    Exam: grimaces to pain, tracks  Meds: HYDROmorphone  Injectable 0.25 milliGRAM(s) IV Push every 3 hours PRN Moderate Pain (4 - 6)  melatonin 5 milliGRAM(s) Oral at bedtime  mirtazapine 7.5 milliGRAM(s) Oral at bedtime      RESPIRATORY  RR: 22 (01-21-25 @ 22:30) (20 - 32)  SpO2: 96% (01-21-25 @ 23:29) (90% - 100%)  Wt(kg): --  Exam: unlabored, clear to auscultation bilaterally  Mechanical Ventilation: Mode: AC/ CMV (Assist Control/ Continuous Mandatory Ventilation), RR (machine): 14, RR (patient): 25, TV (machine): 470, FiO2: 40, PEEP: 5, ITime: 0.9, MAP: 9, PIP: 22  ABG - ( 21 Jan 2025 06:00 )  pH: 7.43  /  pCO2: 30    /  pO2: 176   / HCO3: 20    / Base Excess: -3.6  /  SaO2: 100.0   Lactate: x                Meds: albuterol/ipratropium for Nebulization 3 milliLiter(s) Nebulizer every 6 hours PRN Shortness of Breath and/or Wheezing  buDESOnide    Inhalation Suspension 0.5 milliGRAM(s) Inhalation every 12 hours  sodium chloride 3%  Inhalation 4 milliLiter(s) Inhalation every 6 hours      CARDIOVASCULAR  HR: 97 (01-21-25 @ 23:29) (91 - 112)  BP: --  BP(mean): --  ABP: 82/39 (01-21-25 @ 22:30) (82/39 - 128/60)  ABP(mean): 56 (01-21-25 @ 22:30) (51 - 85)  Wt(kg): --  CVP(cm H2O): --      Exam: regular rate and rhythm  Cardiac Rhythm: sinus  Perfusion     [x]Adequate   [ ]Inadequate  Mentation   [x]Normal       [ ]Reduced  Extremities  [x]Warm         [ ]Cool  Volume Status [ ]Hypervolemic [x]Euvolemic [ ]Hypovolemic  Meds: metoprolol tartrate Injectable 2.5 milliGRAM(s) IV Push every 6 hours  midodrine 30 milliGRAM(s) Oral every 8 hours  phenylephrine    Infusion 4 MICROgram(s)/kG/Min IV Continuous <Continuous>      GI/NUTRITION  Exam: soft, nontender, nondistended  Diet:  trickle feeds  Meds: pantoprazole  Injectable 40 milliGRAM(s) IV Push every 12 hours      GENITOURINARY  I&O's Detail    01-20 @ 07:01 - 01-21 @ 07:00  --------------------------------------------------------  IN:    Enteral Tube Flush: 180 mL    IV PiggyBack: 569 mL    IV PiggyBack: 100 mL    IV PiggyBack: 550 mL    Phenylephrine: 429.2 mL    Platelets - Single Donor: 675 mL    Vasopressin: 108 mL    Vital1.5: 420 mL  Total IN: 3031.2 mL    OUT:    Bulb (mL): 225 mL    Ileostomy (mL): 40 mL    Indwelling Catheter - Urethral (mL): 10 mL    Nasogastric/Oral tube (mL): 400 mL    Other (mL): 2931 mL    VAC (Vacuum Assisted Closure) System (mL): 0 mL  Total OUT: 3606 mL    Total NET: -574.8 mL      01-21 @ 07:01 - 01-22 @ 00:39  --------------------------------------------------------  IN:    Albumin 25%  -  50 mL: 50 mL    Enteral Tube Flush: 100 mL    IV PiggyBack: 250 mL    IV PiggyBack: 700 mL    Phenylephrine: 485.6 mL    Platelets - Single Donor: 450 mL    Vasopressin: 72 mL  Total IN: 2107.6 mL    OUT:    Bulb (mL): 250 mL    Ileostomy (mL): 75 mL    Indwelling Catheter - Urethral (mL): 10 mL    Nasogastric/Oral tube (mL): 25 mL    Other (mL): 1061 mL    VAC (Vacuum Assisted Closure) System (mL): 0 mL  Total OUT: 1421 mL    Total NET: 686.6 mL        Weight (kg): 100.5 (01-21 @ 20:59)  01-21    135  |  102  |  20  ----------------------------<  100[H]  4.3   |  19[L]  |  <0.30[L]    Ca    8.3[L]      21 Jan 2025 18:18  Phos  3.7     01-21  Mg     2.5     01-21    TPro  3.4[L]  /  Alb  2.8[L]  /  TBili  29.5[H]  /  DBili  x   /  AST  58[H]  /  ALT  25  /  AlkPhos  182[H]  01-21    Meds: albumin human 25% IVPB 50 milliLiter(s) IV Intermittent every 6 hours      HEMATOLOGIC  Meds: romiPLOStim Injectable 100 MICROGram(s) SubCutaneous every 7 days                          9.3    0.63  )-----------( 14       ( 21 Jan 2025 21:21 )             26.6     PT/INR - ( 21 Jan 2025 06:14 )   PT: 22.3 sec;   INR: 1.97 ratio         PTT - ( 21 Jan 2025 06:14 )  PTT:55.6 sec    INFECTIOUS DISEASES  T(C): 36.7 (01-21-25 @ 15:00), Max: 37.7 (01-21-25 @ 03:00)  Wt(kg): --  WBC Count: 0.63 K/uL (01-21 @ 21:21)  WBC Count: 0.73 K/uL (01-21 @ 18:18)  WBC Count: 0.81 K/uL (01-21 @ 06:14)  WBC Count: 0.79 K/uL (01-21 @ 02:21)    Recent Cultures:  Specimen Source: Combi-Cath, 01-19 @ 12:51; Results   Commensal charity consistent with body site[!]; Gram Stain:   No polymorphonuclear leukocytes per low power field  No Squamous epithelial cells per low power field  Rare Yeast like cells per oil power field[!]; Organism: --  Specimen Source: .Blood BLOOD, 01-18 @ 15:45; Results   Growth in aerobic bottle: Stenotrophomonas maltophilia  See previous culture [id number][!]; Gram Stain:   Growth in aerobic bottle: Gram Negative Rods[!]; Organism: --  Specimen Source: .Blood BLOOD, 01-18 @ 15:43; Results   Growth in aerobic bottle: Stenotrophomonas maltophilia  Direct identification is available within approximately 3-5  hours either by Blood Panel Multiplexed PCR or Direct  MALDI-TOF. Details: [email or web address][!]; Gram Stain:   Growth in aerobic bottle: Gram Negative Rods[!]; Organism: Blood Culture PCR  Stenotrophomonas maltophilia[!]  Specimen Source: .Blood Blood, 01-17 @ 02:36; Results   Growth in Myco/F Lytic Bottle: Stenotrophomonas maltophilia[!]; Gram Stain:   Growth in Myco/F Lytic Bottle: Gram Negative Rods[!]; Organism: --    Meds: epoetin tonja (PROCRIT) Injectable 26929 Unit(s) IV Push every 7 days  filgrastim-sndz (ZARXIO) Injectable 480 MICROGram(s) SubCutaneous every 24 hours  ganciclovir IVPB 250 milliGRAM(s) IV Intermittent every 24 hours  levoFLOXacin IVPB 500 milliGRAM(s) IV Intermittent every 24 hours  minocycline IVPB 200 milliGRAM(s) IV Intermittent every 12 hours  trimethoprim / sulfamethoxazole IVPB 300 milliGRAM(s) IV Intermittent every 8 hours      ENDOCRINE  Capillary Blood Glucose    Meds: insulin lispro (ADMELOG) corrective regimen sliding scale   SubCutaneous every 6 hours  methylPREDNISolone sodium succinate Injectable 16 milliGRAM(s) IV Push daily  vasopressin Infusion 0.03 Unit(s)/Min IV Continuous <Continuous>      OTHER MEDICATIONS:  chlorhexidine 0.12% Liquid 15 milliLiter(s) Oral Mucosa every 12 hours  chlorhexidine 2% Cloths 1 Application(s) Topical <User Schedule>  collagenase Ointment 1 Application(s) Topical daily  CRRT Treatment    <Continuous>  FIRST- Mouthwash  BLM 10 milliLiter(s) Swish and Spit every 8 hours PRN  mupirocin 2% Ointment 1 Application(s) Topical every 12 hours  nystatin Powder 1 Application(s) Topical every 12 hours  Phoxillum Filtration BK 4 / 2.5 5000 milliLiter(s) CRRT <Continuous>  Phoxillum Filtration BK 4 / 2.5 5000 milliLiter(s) CRRT <Continuous>  PrismaSOL Filtration BGK 4 / 2.5 5000 milliLiter(s) CRRT <Continuous>      IMAGING:

## 2025-01-22 NOTE — PROGRESS NOTE ADULT - SUBJECTIVE AND OBJECTIVE BOX
Transplant Surgery - Multidisciplinary Rounds  --------------------------------------------------------------  OLT   11/15/2024         Colectomy 12/7/2024     IABP 12/7 removed 12/10  Tracheostomy 12/17/2024    Present: Patient seen and examined with multidisciplinary transplant team including Surgeon Dr. Dagher, Hepatologist Dr. Hall, [facility] Cm/Gopi/Ale/Jasmine and bedside RN during AM rounds. Disciplines not in attendance will be notified of plan.    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   s/p OLT 11/15 with post op course c/b:  Hypoxia: Reintubated 11/20, extubated 11/24->reintubated 11/24, extubated 11/26, reintubated 12/7, trached 12/17  Ileus   A fib  AMS/Seizure   L brachial DVT  Neutropenia  E coli Bacteremia (12/6)  s/p Colectomy 12/7.   IABP 12/7, removed 12/10  Ec Faecalis UTI (12/16)  Stenotrophamonas in trach aspirate (12/19)  Clostridium in blood 12/23  UGIB 12/26  CMV Viremia  MORAIMA requiring CRRT  Shock liver    Interval/Overnight Events:     Immunosuppression:  - Cyclo HELD, MMF HELD, solumed 16  -ongoing monitoring for signs of rejection    TX DATA HERE    ROS: Unable to assess patient is lethargic, s/p tracheostomy    PHYSICAL EXAM:   Constitutional:  awake  Eyes:  PERRLA, Scleral icterus  ENMT: nc/at, no thrush, NGT  Neck: supple, trach   Respiratory: CTA B/L  Cardiovascular: RRR  Gastrointestinal: incision clean/dry/intact + Ostomy red low/no output, wound vac, peritoneal drains x1 bilious   Genitourinary: +scrotal edema w/ large fluid collection; black/green discoloration  Extremities: SCD's in place and working bilaterally, + UE/LE edema  Neurological: opens eyes to voice   Skin: large sacral decub, poor healing with breakdown  Transplant Surgery - Multidisciplinary Rounds  --------------------------------------------------------------  OLT   11/15/2024         Colectomy 12/7/2024     IABP 12/7 removed 12/10  Tracheostomy 12/17/2024    Present: Patient seen and examined with multidisciplinary transplant team including Surgeon Dr. Dagher, Hepatologist Dr. Hall, [facility] Cm/Gopi/Jasmine and bedside RN during AM rounds. Disciplines not in attendance will be notified of plan.    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   s/p OLT 11/15 with post op course c/b:  Hypoxia: Reintubated 11/20, extubated 11/24->reintubated 11/24, extubated 11/26, reintubated 12/7, trached 12/17  Ileus   A fib  AMS/Seizure   L brachial DVT  Neutropenia  E coli Bacteremia (12/6)  s/p Colectomy 12/7.   IABP 12/7, removed 12/10  Ec Faecalis UTI (12/16)  Stenotrophamonas in trach aspirate (12/19)  Clostridium in blood 12/23  UGIB 12/26  CMV Viremia  MORAIMA requiring CRRT  Shock liver    Interval/Overnight Events:   - on/off pressors, low grade temps on abx  - CRRT net even  - low ileostomy output, Trickle feeds  -  on gangciclovir  - 2u plt    Immunosuppression:  - Cyclo HELD, MMF HELD, solumed 8  -ongoing monitoring for signs of rejection      MEDICATIONS  (STANDING):  albumin human 25% IVPB 50 milliLiter(s) IV Intermittent every 6 hours  buDESOnide    Inhalation Suspension 0.5 milliGRAM(s) Inhalation every 12 hours  cefiderocol IVPB 1500 milliGRAM(s) IV Intermittent every 8 hours  chlorhexidine 0.12% Liquid 15 milliLiter(s) Oral Mucosa every 12 hours  chlorhexidine 2% Cloths 1 Application(s) Topical <User Schedule>  collagenase Ointment 1 Application(s) Topical daily  CRRT Treatment    <Continuous>  epoetin tonja (PROCRIT) Injectable 89175 Unit(s) IV Push every 7 days  filgrastim-sndz (ZARXIO) Injectable 480 MICROGram(s) SubCutaneous every 24 hours  ganciclovir IVPB 250 milliGRAM(s) IV Intermittent every 24 hours  insulin lispro (ADMELOG) corrective regimen sliding scale   SubCutaneous every 6 hours  melatonin 5 milliGRAM(s) Oral at bedtime  metoprolol tartrate Injectable 2.5 milliGRAM(s) IV Push every 6 hours  midodrine 30 milliGRAM(s) Oral every 8 hours  minocycline IVPB 200 milliGRAM(s) IV Intermittent every 12 hours  mirtazapine 7.5 milliGRAM(s) Oral at bedtime  mupirocin 2% Ointment 1 Application(s) Topical every 12 hours  norepinephrine Infusion 0.05 MICROgram(s)/kG/Min (9.42 mL/Hr) IV Continuous <Continuous>  nystatin Powder 1 Application(s) Topical every 12 hours  pantoprazole  Injectable 40 milliGRAM(s) IV Push every 12 hours  phenylephrine    Infusion 4 MICROgram(s)/kG/Min (74.6 mL/Hr) IV Continuous <Continuous>  Phoxillum Filtration BK 4 / 2.5 5000 milliLiter(s) (1500 mL/Hr) CRRT <Continuous>  Phoxillum Filtration BK 4 / 2.5 5000 milliLiter(s) (1200 mL/Hr) CRRT <Continuous>  PrismaSOL Filtration BGK 4 / 2.5 5000 milliLiter(s) (200 mL/Hr) CRRT <Continuous>  romiPLOStim Injectable 100 MICROGram(s) SubCutaneous every 7 days  vasopressin Infusion 0.03 Unit(s)/Min (4.5 mL/Hr) IV Continuous <Continuous>    MEDICATIONS  (PRN):  albuterol/ipratropium for Nebulization 3 milliLiter(s) Nebulizer every 6 hours PRN Shortness of Breath and/or Wheezing  FIRST- Mouthwash  BLM 10 milliLiter(s) Swish and Spit every 8 hours PRN Mouth Care  HYDROmorphone  Injectable 0.25 milliGRAM(s) IV Push every 3 hours PRN Moderate Pain (4 - 6)      PAST MEDICAL & SURGICAL HISTORY:  Diabetes      Transaminitis      Paroxysmal atrial fibrillation      Depression      BPH (benign prostatic hyperplasia)      Hypertension      Chronic atrial fibrillation      Coronary artery disease      Hepatocellular carcinoma      DM (diabetes mellitus)      HTN (hypertension)      Paroxysmal atrial fibrillation      Cirrhosis      HCC (hepatocellular carcinoma)      History of BPH      History of laparoscopic cholecystectomy      History of lumbar laminectomy      H/O prior ablation treatment      H/O percutaneous left heart catheterization          Vital Signs Last 24 Hrs  T(C): 37.8 (22 Jan 2025 15:00), Max: 37.8 (22 Jan 2025 15:00)  T(F): 100 (22 Jan 2025 15:00), Max: 100 (22 Jan 2025 15:00)  HR: 94 (22 Jan 2025 15:15) (91 - 105)  BP: 101/52 (22 Jan 2025 08:30) (101/52 - 101/52)  BP(mean): 72 (22 Jan 2025 08:30) (72 - 72)  RR: 19 (22 Jan 2025 15:15) (18 - 50)  SpO2: 100% (22 Jan 2025 15:15) (94% - 100%)    Parameters below as of 22 Jan 2025 15:00  Patient On (Oxygen Delivery Method): ventilator    O2 Concentration (%): 40    I&O's Summary    21 Jan 2025 07:01  -  22 Jan 2025 07:00  --------------------------------------------------------  IN: 3629.6 mL / OUT: 3294 mL / NET: 335.6 mL    22 Jan 2025 07:01  -  22 Jan 2025 16:17  --------------------------------------------------------  IN: 1059.8 mL / OUT: 1043 mL / NET: 16.8 mL                              8.9    0.56  )-----------( 7        ( 22 Jan 2025 05:56 )             24.8     01-22    135  |  102  |  19  ----------------------------<  104[H]  4.2   |  19[L]  |  <0.30[L]    Ca    8.5      22 Jan 2025 13:17  Phos  3.6     01-22  Mg     2.5     01-22    TPro  3.8[L]  /  Alb  3.1[L]  /  TBili  30.7[H]  /  DBili  x   /  AST  47[H]  /  ALT  25  /  AlkPhos  153[H]  01-22          Culture - Blood (collected 01-21-25 @ 08:57)  Source: .Blood BLOOD  Gram Stain (01-22-25 @ 09:59):    Growth in aerobic bottle: Gram Negative Rods  Preliminary Report (01-22-25 @ 10:00):    Growth in aerobic bottle: Gram Negative Rods    Culture - Blood (collected 01-21-25 @ 08:27)  Source: .Blood BLOOD  Gram Stain (01-22-25 @ 11:20):    Growth in anaerobic bottle: Gram Negative Rods    Growth in aerobic bottle: Gram Negative Rods  Preliminary Report (01-22-25 @ 11:20):    Growth in anaerobic bottle: Gram Negative Rods    Growth in aerobic bottle: Gram Negative Rods    Culture - Bronchial (collected 01-19-25 @ 12:51)  Source: Combi-Cath  Gram Stain (01-19-25 @ 22:24):    No polymorphonuclear leukocytes per low power field    No Squamous epithelial cells per low power field    Rare Yeast like cells per oil power field  Preliminary Report (01-22-25 @ 13:34):    Moderate Stenotrophomonas maltophilia    Commensal charity consistent with body site  Organism: Stenotrophomonas maltophilia (01-22-25 @ 13:34)  Organism: Stenotrophomonas maltophilia (01-22-25 @ 13:34)    Culture - Blood (collected 01-18-25 @ 15:45)  Source: .Blood BLOOD  Gram Stain (01-20-25 @ 04:05):    Growth in aerobic bottle: Gram Negative Rods  Final Report (01-21-25 @ 18:40):    Growth in aerobic bottle: Stenotrophomonas maltophilia    See previous culture [id number]    Culture - Blood (collected 01-18-25 @ 15:43)  Source: .Blood BLOOD  Gram Stain (01-20-25 @ 03:25):    Growth in aerobic bottle: Gram Negative Rods  Preliminary Report (01-20-25 @ 18:36):    Growth in aerobic bottle: Stenotrophomonas maltophilia    Direct identification is available within approximately 3-5    hours either by Blood Panel Multiplexed PCR or Direct    MALDI-TOF. Details: [email or web address]  Organism: Stenotrophomonas maltophilia (01-21-25 @ 16:03)  Organism: Blood Culture PCR  Stenotrophomonas maltophilia (01-21-25 @ 16:01)  Organism: Stenotrophomonas maltophilia (01-21-25 @ 16:01)  Organism: Blood Culture PCR (01-20-25 @ 07:03)    Culture - Fungal, Blood (collected 01-17-25 @ 02:36)  Source: .Blood Blood  Gram Stain (01-20-25 @ 10:04):    Growth in Myco/F Lytic Bottle: Gram Negative Rods  Preliminary Report (01-21-25 @ 10:30):    Growth in Myco/F Lytic Bottle: Stenotrophomonas maltophilia  Organism: Stenotrophomonas maltophilia (01-22-25 @ 08:00)  Organism: Stenotrophomonas maltophilia (01-22-25 @ 08:00)      ROS: Unable to assess patient is lethargic, s/p tracheostomy    PHYSICAL EXAM:   Constitutional:  awake  Eyes:  PERRLA, Scleral icterus  ENMT: nc/at, no thrush, NGT  Neck: supple, trach   Respiratory: CTA B/L  Cardiovascular: RRR  Gastrointestinal: incision clean/dry/intact + Ostomy red low/no output, wound vac, peritoneal drains x1 bilious   Genitourinary: +scrotal edema w/ large fluid collection; black/green discoloration  Extremities: SCD's in place and working bilaterally, + UE/LE edema  Neurological: opens eyes to voice   Skin: large sacral decub, poor healing with breakdown

## 2025-01-22 NOTE — PROGRESS NOTE ADULT - ATTENDING COMMENTS
Pt seen and examined with SICU team.    Patient's status has acutely worsened today. Since this morning, kassandra has increased to 4mcg/kg/min. Alb bolus did not help at all. On rounds, CRRT stopped and TF held, repeat TTE ordered. Pt was on T/C, put back on full support. Labs show ongoing severe pancytopenia, including severe TCP which is likely both consumptive and due to poor production related to bone marrow failure/ suppression. Yesterday's blood cultures show ongoing Stenotrophomonas bacteremia despite bactrim, levaquin, and minocycline.    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 We discussed that I will stop CRRT, continue kassandra, vaso, midodrine, and if kassandra dose reaches 5mcg/kg/min, will add levo, which could precipitate RVR (has been a problem in the past, which is why patient has remained on tiny dose of beta blocker despite being on pressors). He asked me to give more platelets - at this time, given patient's status, his rapid consumption of platelets recently where platelet transfusions have not helped, I feel that additional platelet transfusion is futile - I explained that I will not order additional platelets at this time as it will not affect his outcome. He noted that he will speak to the rest of the family about making the patient DNR. Pt seen and examined with SICU team.    Patient's status has acutely worsened today. Since this morning, aldo has increased to 4mcg/kg/min. Alb bolus did not help at all. On rounds, CRRT stopped and TF held, repeat TTE ordered. Pt was on T/C, put back on full support. Labs show ongoing severe pancytopenia, including severe TCP which is likely both consumptive and due to poor production related to bone marrow failure/ suppression. Yesterday's blood cultures show ongoing Stenotrophomonas bacteremia despite bactrim, levaquin, and minocycline.    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 We discussed that I will stop CRRT, continue aldo, vaso, midodrine, and if aldo dose reaches 5mcg/kg/min, will add levo, which could precipitate RVR (has been a problem in the past, which is why patient has remained on tiny dose of beta blocker despite being on pressors). He asked me to give more platelets - at this time, given patient's status, his rapid consumption of platelets recently where platelet transfusions have not helped, I feel that additional platelet transfusion is futile - I explained that I will not order additional platelets at this time as it will not affect his outcome. He noted that he will speak to the rest of the family about making the patient DNR.    S/p OLT:  - Immunosuppression mostly held for infections  - Has been on solumedrol - today, increased to stress dose  - Tbili 30, INR 2.26    CM viremia:  - On ganciclovir  - Repeat titer 721 on 1/21 (from 1280)    Bone marrow suppression/ failure:  - WBC has been less than one since 1/17  - On filgrastim-sndz, Nplate  - Profound thrombocytopenia (BM suppression as well as consumption) - has been relatively resistant to platelet transfusion.  - Chronic anemia - on epo, with intermittent PRBC transfusions  - Seroquel stopped for TCP  - ID recommended continuing bactrim yesterday    Shock:  - Aldo as high as 4.3mcg/kg/min this morning, now 3.2mcg/kg/min  - Vasopressin 0.03units/min  - Midodrine 30mg tid  - LA 3.2  - Etiology likely multifactorial, including sepsis  - On aldo due to multiple episodes of RVR (as above)  - On Flotrak, CI 4.1 L/min/m2, SVV 6    Altered mental status:  - Multifactorial, including shock, sepsis, and hepatic    Hypoxic respiratory failure:  - Multiple intubations and extubations, s/p trach  - Tolerating a few hours of T/C daily - as patient with worsening shock today, will continue on full vent support  - On 3% saline/ duonebs for secretions    Afib with RVR:  - Has been in RVR multiple times - aldo was chosen instead of levo for this reason. Additionally, will continue very low-dose metoprolol to help control heart rate.    Stenotrophomonas bacteremia:  - Likely source is pulmonary  - Lines were changed 1/20 as Steno can form biofilms (lines are not the original source of this infection).     - 1/11 blood cultures: Stenotrophomonas maltophilia (S to levaquin and bactrim)  - 1/12 Combicath: S. maltophilia  - 1/13 blood cultures: neg  - 1/17 blood cultures: S. maltophilia  - 1/18 blood cultures: S. maltophilia  - 1/19 Combicath: S. maltophilia  - 1/21 blood cultures: GNR    - On minocycline  - Cefiderocol started today; levaquin and bactrim stopped    Prolonged ileus with severe protein-calorie malnutrition:  - Tube feeds at trickle rate had been resumed, but given very high aldo dose, held again.  - Upon review of the past month, patient has received less than goal tube feeds (typically half or less) 50% of the time  - Additionally, of the approximately 14 days when he was receiving full tube feeds, 10 of those days he had more than 1L ileostomy output - although it's not possible to know how much of the nutrition provided was absorbed, it is unlikely that adequate nutrition was absorbed on those days. Of the past 30 days, there were approximately 5 days when patient was able to receive near-goal amount of tube feeds with ileostomy output of less than 1L.  - Pt is not a candidate for TPN given liver dysfunction and bacteremia  - Weight checked but absolutely does not reflect lean body mass.  - Albumin will not reflect nutritional status given frequent alb administration, and pre-albumin will not reflect nutritional status as it's an acute phase reactant .

## 2025-01-22 NOTE — PROGRESS NOTE ADULT - ASSESSMENT
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 Post op course complicated by acute hypoxic respiratory failure requiring intubation multiple times, most recently on 12/7 with conversion to tracheostomy on 12/17, e.coli bacteremia with RTOR on 12/7 for concern for worsening septic shock and cardiogenic shock s/p balloon pump placement and total abdominal colectomy in setting of ischemic bowel s/p OR on 12/9 for ileostomy creation, and olirugirc MORAIMA requiring CRRT and now intermittent HD.    Clinical course complicated by numerous infectious complications including Stenotrophomonas pneumonia, Enterococcus  faecalis isolated on abdominal drains and Clostridium paraputrificum bacteremia. Also with development of wounds including the sacrum and sloughing of scrotum in setting of loss of blood flow to the testes. Most recently with new cavitary pneumonia with coinciding Stenotrophomonas bacteremia.      BCx (1/11/25) Stenotrophomonas  Trach Culture (1/12/25) Stenotrophomonas  Fungitell 1/13 >500  Aspergillus galactomannan (1/12) 0.02    CT Chest (1/11/25) Cavitary left apical lesions measuring 1.9 cm and 1.5 cm in the region of previously groundglass opacity. Branching nodular opacities in the left upper lobe.     Cavitary pneumonia could be secondary to atypical pathogen although its also possible it is secondary to Stenotrophomonas    #Septic Shock, Cavitary pneumonia, Stenotrophomonas Bacteremia (recurrent)  --Disontinue IV Bactrim and IV Levofloxacin  --Begin Cefiderocol and continue the minocycline  --Hold Voriconazole 200 mg IV Q12H and restart tomorrow at 150mg q 12hr  --Follow up on Voriconazole trough repeat in five-seven days      #Pancytopenia  Likely multifactorial etiology  -- neupogen type agents an option?    #CMV Viremia  CMV resistance testing- please send  may need to switch to foscarnet for suspected resistance  received a dose of IVIG for hypogammaglobulinemia  --Restarted IV ganciclovir in induction dosing adjusted for CRRT will continue  --Repeat  Cytomegalovirus By PCR: 611 IU/mL (01.21.25 @ 12:27)      #Positive Blood Culture (12/23 Clostridium paraputrificum)   Clostridium paraputrificum is generally penicillin and metronidazole susceptible  --s/p Metronidazole and Imipenem course    #Liver Transplant Recipient, Prophylactic Antibiotic  --On ganciclovir already as above  --On bactrim already as above    prognosis remains very poor with multiple infections despite minimal immunosuppressive medications     Discussed with SICU team and patient's son    Wes Carrero MD  Can be called via Teams  After 5pm/weekends [phone]      The above assessment and plan were discussed with SICU Team  Home

## 2025-01-23 NOTE — PHARMACOTHERAPY INTERVENTION NOTE - NSPHARMCOMMASP
ASP - Renal dose adjustment
ASP - Therapy recommended/ Alternative therapy
ASP - Renal dose adjustment
ASP - Dose optimization/Non-Renal dose adjustment
ASP - Dose optimization/Non-Renal dose adjustment

## 2025-01-23 NOTE — PROGRESS NOTE ADULT - ASSESSMENT
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, cultures have since cleared  - Tx ID following - dc Levaquin and bactrim, start cefiderocal per ID, cont with minocycline  - 1/1 CT CAP: small pleural effusions, enteritis, rest ok  - all lines changed over 1/3  - Wound care for sacral decubitus ulcer  - CT chest/abd/pelvis 1/11 showed apical cavitating lesions likely fungal ID switched Caspo to Voriconazole  - 1/13 Blood Cx + Stenotrophomonas, 1/18 Cx - growing gram - rods  - neupogen PRN for low WBC   - Wound Vac change 1/21    [] MORAIMA:   - CRRT started 12/7, stopped 12/15, resumed CRRT 12/23, now on nocturnal CRRT negative 50cc/hr  - CRRT stopped overnight for line exchange, prev. clotting machine 1/21  - still anuric     [ ] UGIB s/p EGD (12/26) showing generalized oozing, coagulopathy  - Correct coagulopathy per SICU  - Protonix  - TF at goal  -nplate started q7days for thrombocytopenia    [] s/p OLT  - poor graft function, trial of plasmapheresis 1/3, 1/5, 1/7 for (3-5 days), PLEX #4 1/9,   - IVIG #1 on 1/8, #2 1/9   - PLEX and IVIG held 1/11 +1/12  - repeat liver US unchanged  - Diet: NPO, low/no ostomy output  - Pain management: avoid narcotics  - Strict I&Os, wound vac placed 12/10   - US: L brachial DVT (holding A/C)  - wound vac care  - ursodiol 300mgBID     [ ] Immunosuppression  - Tacrolimus stopped 11/18 in setting of AMS/Seizure, Started Cyclo 12/17, now held for worsening functional status   - Cyclo held, MMF HELD, Solumedrol decr to 8 mg daily    [] lleus   -@ home on Linzess  - imaging with distended colon, improving, (likely opioid induced)  - s/p Neostigmine x 2  - s/p Relistor, last dose 12/1  - s/p colectomy with end ileostomy  (see above)  - low ileostomy output post lomotil, no tube feeds  - Colorectal following  - replace losses as able  - Ileostomy output increasing (now 75cc/24 from 30), melanotic stool    [] CMV viremia  - CMV PCR (12/11 and 12/16): neg, positive CMV PCR on 12/23  - CMV viral load incr from 537 to 1240 1/6, at 1280 on 1/13, ganciclovir, at 721 on 1/21    [ ] AMS  - Reintubated 11/20 Aspiration/hypoxia, extubated 11/24->[id number]/24--> extubated 11/26 -> [id number]/7 -> tracheostomy 12/17 -> trach collar trials starting 12/29  - EEG 11/30 negative, Neurology following  - BH following   - off tacro  -CT Head No Cont (12/20): Age-indeterminate posterior left frontal lobe infarct.   -CT Head (12/25): Evolving subacute infarct in the left supramarginal gyrus  -repeat CTH ok, poor MS   - melatonin QHS     [ ] HTN/ pAFib  [ ] coronary vasospasm vs posterior wall MI  - remains off all a/c, failed hep gtt/argatroban due to UGIB  - Cards- plan for PCI prior to DC   - Off Amiodarone, off dobutamine  - BB as needed  - Dr. 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, cultures have since cleared  - 1/1 CT CAP: small pleural effusions, enteritis, rest ok  - all lines changed over 1/3  - Wound care for sacral decubitus ulcer  - CT chest/abd/pelvis 1/11 showed apical cavitating lesions likely fungal ID switched Caspo to Voriconazole  - 1/13 Blood Cx + Stenotrophomonas, 1/18 Cx - growing gram - rods  - neupogen PRN for low WBC   - Wound Vac change 1/21  - Tx ID following - dc Levaquin and bactrim, start cefiderocal per ID, cont with minocycline  - repeat cultures 1/23    [] MORAIMA:   - CRRT started 12/7, stopped 12/15, resumed CRRT 12/23, now on nocturnal CRRT negative 50cc/hr  - still anuric     [ ] UGIB s/p EGD (12/26) showing generalized oozing, coagulopathy  - Correct coagulopathy per SICU  - Protonix  - TF at goal  -nplate started q7days for thrombocytopenia    [] s/p OLT  - poor graft function, trial of plasmapheresis 1/3, 1/5, 1/7 for (3-5 days), PLEX #4 1/9,   - IVIG #1 on 1/8, #2 1/9   - PLEX and IVIG held 1/11 +1/12  - repeat liver US unchanged  - Diet: NPO, low/no ostomy output  - Pain management: avoid narcotics  - Strict I&Os, wound vac placed 12/10   - US: L brachial DVT (holding A/C)  - wound vac care    [ ] Immunosuppression  - Tacrolimus stopped 11/18 in setting of AMS/Seizure, Started Cyclo 12/17, now held for worsening functional status   - Cyclo held, MMF HELD, Solumedrol 8 mg daily    [] lleus   -@ home on Linzess  - imaging with distended colon, improving, (likely opioid induced)  - s/p Neostigmine x 2  - s/p Relistor, last dose 12/1  - s/p colectomy with end ileostomy  (see above)  - low ileostomy output post lomotil, no tube feeds  - Colorectal following  - replace losses as able  - Ileostomy output increasing (now 75cc/24 from 30), melanotic stool    [] CMV viremia  - CMV PCR (12/11 and 12/16): neg, positive CMV PCR on 12/23  - CMV viral load incr from 537 to 1240 1/6, at 1280 on 1/13, ganciclovir, at 721 on 1/21    [ ] AMS  - Reintubated 11/20 Aspiration/hypoxia, extubated 11/24->[id number]/24--> extubated 11/26 -> [id number]/7 -> tracheostomy 12/17 -> trach collar trials starting 12/29  - EEG 11/30 negative, Neurology following  - BH following   - off tacro  -CT Head No Cont (12/20): Age-indeterminate posterior left frontal lobe infarct.   -CT Head (12/25): Evolving subacute infarct in the left supramarginal gyrus  -repeat CTH ok, poor MS   - melatonin QHS     [ ] HTN/ pAFib  [ ] coronary vasospasm vs posterior wall MI  - remains off all a/c, failed hep gtt/argatroban due to UGIB  - Cards- plan for PCI prior to DC   - Off Amiodarone, off dobutamine  - BB as needed  - Dr. Quintanilla following    [ ] DM  - per SICU     [] Scrotal abscess   - US (12/12): 2.1 x0.9 x 3.2 cm focal, right testicle- possible abscess (12/12)  - Urology recs: CT scrotum review no debridement required  - Urology recs Derm consult for guidance on wound care   - 12/23 US doppler scrotum: no arterial flow, limited venous flow, bl hydrocele  - 12/15 CT CAP no acute changes   - Elevate scrotum w/ support Urology consult appreciated

## 2025-01-23 NOTE — PROGRESS NOTE ADULT - SUBJECTIVE AND OBJECTIVE BOX
Transplant Surgery - Multidisciplinary Rounds  --------------------------------------------------------------  OLT   11/15/2024         Colectomy 12/7/2024     IABP 12/7 removed 12/10  Tracheostomy 12/17/2024    Present: Patient seen and examined with multidisciplinary transplant team including Surgeon Dr. Dagher, Hepatologist Dr. Hall, [facility] Cm/Gopi/Jasmine and bedside RN during AM rounds. Disciplines not in attendance will be notified of plan.    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   s/p OLT 11/15 with post op course c/b:  Hypoxia: Reintubated 11/20, extubated 11/24->reintubated 11/24, extubated 11/26, reintubated 12/7, trached 12/17  Ileus   A fib  AMS/Seizure   L brachial DVT  Neutropenia  E coli Bacteremia (12/6)  s/p Colectomy 12/7.   IABP 12/7, removed 12/10  Ec Faecalis UTI (12/16)  Stenotrophamonas in trach aspirate (12/19)  Clostridium in blood 12/23  UGIB 12/26  CMV Viremia  MORAIMA requiring CRRT  Shock liver    Interval/Overnight Events:       Immunosuppression:  - Cyclo HELD, MMF HELD, solumed 8  -ongoing monitoring for signs of rejection    TX DATA HERE    ROS: Unable to assess patient is lethargic, s/p tracheostomy    PHYSICAL EXAM:   Constitutional:  awake  Eyes:  PERRLA, Scleral icterus  ENMT: nc/at, no thrush, NGT  Neck: supple, trach   Respiratory: CTA B/L  Cardiovascular: RRR  Gastrointestinal: incision clean/dry/intact + Ostomy red low/no output, wound vac, peritoneal drains x1 bilious   Genitourinary: +scrotal edema w/ large fluid collection; black/green discoloration  Extremities: SCD's in place and working bilaterally, + UE/LE edema  Neurological: opens eyes to voice   Skin: large sacral decub, poor healing with breakdown  Transplant Surgery - Multidisciplinary Rounds  --------------------------------------------------------------  OLT   11/15/2024         Colectomy 12/7/2024     IABP 12/7 removed 12/10  Tracheostomy 12/17/2024    Present: Patient seen and examined with multidisciplinary transplant team including Surgeon Dr. Dagher, Hepatologist Dr. Hall, [facility] Radha/Gopi/Jasmine and bedside RN during AM rounds. Disciplines not in attendance will be notified of plan.    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   s/p OLT 11/15 with post op course c/b:  Hypoxia: Reintubated 11/20, extubated 11/24->reintubated 11/24, extubated 11/26, reintubated 12/7, trached 12/17  Ileus   A fib  AMS/Seizure   L brachial DVT  Neutropenia  E coli Bacteremia (12/6)  s/p Colectomy 12/7.   IABP 12/7, removed 12/10  Ec Faecalis UTI (12/16)  Stenotrophamonas in trach aspirate (12/19)  Clostridium in blood 12/23  UGIB 12/26  CMV Viremia  MORAIMA requiring CRRT  Shock liver    Interval/Overnight Events:       Immunosuppression:  - Cyclo HELD, MMF HELD, solumed 8  -ongoing monitoring for signs of rejection    TX DATA HERE    ROS: Unable to assess patient is lethargic, s/p tracheostomy    PHYSICAL EXAM:   Constitutional:  awake  Eyes:  PERRLA, Scleral icterus  ENMT: nc/at, no thrush, NGT  Neck: supple, trach   Respiratory: CTA B/L  Cardiovascular: RRR  Gastrointestinal: incision clean/dry/intact + Ostomy red low/no output, wound vac, peritoneal drains x1 bilious   Genitourinary: +scrotal edema w/ large fluid collection; black/green discoloration  Extremities: SCD's in place and working bilaterally, + UE/LE edema  Neurological: opens eyes to voice   Skin: large sacral decub, poor healing with breakdown  Transplant Surgery - Multidisciplinary Rounds  --------------------------------------------------------------  OLT   11/15/2024         Colectomy 12/7/2024     IABP 12/7 removed 12/10  Tracheostomy 12/17/2024    Present: Patient seen and examined with multidisciplinary transplant team including Surgeon Dr. Dagher, Hepatologist Dr. Hall, [facility] Radha/Cm/Jasmine and bedside RN during AM rounds. Disciplines not in attendance will be notified of plan.    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   s/p OLT 11/15 with post op course c/b:  Hypoxia: Reintubated 11/20, extubated 11/24->reintubated 11/24, extubated 11/26, reintubated 12/7, trached 12/17  Ileus   A fib  AMS/Seizure   L brachial DVT  Neutropenia  E coli Bacteremia (12/6)  s/p Colectomy 12/7.   IABP 12/7, removed 12/10  Ec Faecalis UTI (12/16)  Stenotrophamonas in trach aspirate (12/19)  Clostridium in blood 12/23  UGIB 12/26  CMV Viremia  MORAIMA requiring CRRT  Shock liver    Interval/Overnight Events:   - on trickle feeds  - remains on kassandra and vaso      Immunosuppression:  - Cyclo HELD, MMF HELD, solumed 8  -ongoing monitoring for signs of rejection      MEDICATIONS  (STANDING):  buDESOnide    Inhalation Suspension 0.5 milliGRAM(s) Inhalation every 12 hours  cefiderocol IVPB 750 milliGRAM(s) IV Intermittent every 12 hours  chlorhexidine 0.12% Liquid 15 milliLiter(s) Oral Mucosa every 12 hours  chlorhexidine 2% Cloths 1 Application(s) Topical <User Schedule>  collagenase Ointment 1 Application(s) Topical daily  epoetin tonja (PROCRIT) Injectable 72829 Unit(s) IV Push every 7 days  filgrastim-sndz (ZARXIO) Injectable 480 MICROGram(s) SubCutaneous every 24 hours  insulin lispro (ADMELOG) corrective regimen sliding scale   SubCutaneous every 6 hours  melatonin 5 milliGRAM(s) Oral at bedtime  methylPREDNISolone sodium succinate Injectable 8 milliGRAM(s) IV Push daily  metoprolol tartrate Injectable 2.5 milliGRAM(s) IV Push every 6 hours  midodrine 30 milliGRAM(s) Oral every 8 hours  minocycline IVPB 200 milliGRAM(s) IV Intermittent every 12 hours  mirtazapine 7.5 milliGRAM(s) Oral at bedtime  mupirocin 2% Ointment 1 Application(s) Topical every 12 hours  nystatin Powder 1 Application(s) Topical every 12 hours  pantoprazole  Injectable 40 milliGRAM(s) IV Push every 12 hours  phenylephrine    Infusion 4 MICROgram(s)/kG/Min (74.6 mL/Hr) IV Continuous <Continuous>  vasopressin Infusion 0.03 Unit(s)/Min (4.5 mL/Hr) IV Continuous <Continuous>  voriconazole IVPB 150 milliGRAM(s) IV Intermittent every 12 hours    MEDICATIONS  (PRN):  albuterol/ipratropium for Nebulization 3 milliLiter(s) Nebulizer every 6 hours PRN Shortness of Breath and/or Wheezing  FIRST- Mouthwash  BLM 10 milliLiter(s) Swish and Spit every 8 hours PRN Mouth Care  HYDROmorphone  Injectable 0.25 milliGRAM(s) IV Push every 3 hours PRN Moderate Pain (4 - 6)      PAST MEDICAL & SURGICAL HISTORY:  Diabetes      Transaminitis      Paroxysmal atrial fibrillation      Depression      BPH (benign prostatic hyperplasia)      Hypertension      Chronic atrial fibrillation      Coronary artery disease      Hepatocellular carcinoma      DM (diabetes mellitus)      HTN (hypertension)      Paroxysmal atrial fibrillation      Cirrhosis      HCC (hepatocellular carcinoma)      History of BPH      History of laparoscopic cholecystectomy      History of lumbar laminectomy      H/O prior ablation treatment      H/O percutaneous left heart catheterization          Vital Signs Last 24 Hrs  T(C): 37 (23 Jan 2025 15:00), Max: 37.2 (22 Jan 2025 19:00)  T(F): 98.6 (23 Jan 2025 15:00), Max: 99 (22 Jan 2025 19:00)  HR: 93 (23 Jan 2025 17:45) (85 - 106)  BP: 114/58 (22 Jan 2025 20:00) (114/58 - 114/58)  BP(mean): 78 (22 Jan 2025 20:00) (78 - 78)  RR: 21 (23 Jan 2025 17:45) (16 - 27)  SpO2: 97% (23 Jan 2025 17:45) (90% - 100%)    Parameters below as of 23 Jan 2025 11:00  Patient On (Oxygen Delivery Method): ventilator        I&O's Summary    22 Jan 2025 07:01  -  23 Jan 2025 07:00  --------------------------------------------------------  IN: 3843.3 mL / OUT: 1173 mL / NET: 2670.3 mL    23 Jan 2025 07:01  -  23 Jan 2025 17:58  --------------------------------------------------------  IN: 1414.2 mL / OUT: 0 mL / NET: 1414.2 mL                              9.7    0.66  )-----------( 6        ( 23 Jan 2025 14:48 )             27.9     01-23    134[L]  |  102  |  31[H]  ----------------------------<  116[H]  4.3   |  16[L]  |  <0.30[L]    Ca    8.5      23 Jan 2025 12:22  Phos  4.1     01-23  Mg     3.0     01-23    TPro  3.7[L]  /  Alb  3.1[L]  /  TBili  29.9[H]  /  DBili  x   /  AST  37  /  ALT  25  /  AlkPhos  121[H]  01-23          Culture - Blood (collected 01-21-25 @ 08:57)  Source: .Blood BLOOD  Gram Stain (01-22-25 @ 09:59):    Growth in aerobic bottle: Gram Negative Rods  Final Report (01-23-25 @ 14:04):    Growth in aerobic bottle: Klebsiella pneumoniae    See previous culture [id number]    Culture - Blood (collected 01-21-25 @ 08:27)  Source: .Blood BLOOD  Gram Stain (01-22-25 @ 11:20):    Growth in anaerobic bottle: Gram Negative Rods    Growth in aerobic bottle: Gram Negative Rods  Final Report (01-23-25 @ 14:03):    Growth in aerobic and anaerobic bottles: Klebsiella pneumoniae  Organism: Klebsiella pneumoniae (01-23-25 @ 14:03)  Organism: Klebsiella pneumoniae (01-23-25 @ 14:03)    Culture - Bronchial (collected 01-19-25 @ 12:51)  Source: Combi-Cath  Gram Stain (01-19-25 @ 22:24):    No polymorphonuclear leukocytes per low power field    No Squamous epithelial cells per low power field    Rare Yeast like cells per oil power field  Final Report (01-23-25 @ 08:08):    Moderate Stenotrophomonas maltophilia    Commensal charity consistent with body site  Organism: Stenotrophomonas maltophilia (01-23-25 @ 08:08)  Organism: Stenotrophomonas maltophilia (01-23-25 @ 08:08)  Organism: Stenotrophomonas maltophilia (01-23-25 @ 08:08)    Culture - Blood (collected 01-18-25 @ 15:45)  Source: .Blood BLOOD  Gram Stain (01-20-25 @ 04:05):    Growth in aerobic bottle: Gram Negative Rods  Final Report (01-21-25 @ 18:40):    Growth in aerobic bottle: Stenotrophomonas maltophilia    See previous culture [id number]    Culture - Blood (collected 01-18-25 @ 15:43)  Source: .Blood BLOOD  Gram Stain (01-20-25 @ 03:25):    Growth in aerobic bottle: Gram Negative Rods  Final Report (01-22-25 @ 17:49):    Growth in aerobic bottle: Stenotrophomonas maltophilia    Direct identification is available within approximately 3-5    hours either by Blood Panel Multiplexed PCR or Direct    MALDI-TOF. Details: [email or web address]  Organism: Blood Culture PCR  Stenotrophomonas maltophilia (01-22-25 @ 17:49)  Organism: Stenotrophomonas maltophilia (01-22-25 @ 17:49)  Organism: Stenotrophomonas maltophilia (01-22-25 @ 17:49)  Organism: Blood Culture PCR (01-22-25 @ 17:49)    Culture - Fungal, Blood (collected 01-17-25 @ 02:36)  Source: .Blood Blood  Gram Stain (01-20-25 @ 10:04):    Growth in Myco/F Lytic Bottle: Gram Negative Rods  Preliminary Report (01-21-25 @ 10:30):    Growth in Myco/F Lytic Bottle: Stenotrophomonas maltophilia  Organism: Stenotrophomonas maltophilia (01-22-25 @ 08:00)  Organism: Stenotrophomonas maltophilia (01-22-25 @ 08:00)      ROS: Unable to assess    PHYSICAL EXAM:   Constitutional:  awake  Eyes:  PERRLA, Scleral icterus  ENMT: nc/at, no thrush, NGT  Neck: supple, trach   Respiratory: CTA B/L  Cardiovascular: RRR  Gastrointestinal: incision clean/dry/intact + Ostomy red low/no output, wound vac, peritoneal drains x1 bilious   Genitourinary: +scrotal edema w/ large fluid collection; black/green discoloration  Extremities: SCD's in place and working bilaterally, + UE/LE edema  Neurological: opens eyes to voice   Skin: large sacral decub, poor healing with breakdown

## 2025-01-23 NOTE — PROGRESS NOTE ADULT - SUBJECTIVE AND OBJECTIVE BOX
INFECTIOUS DISEASES FOLLOW UP-- Renée Carrero  [phone]    This is a follow up note for this  74yMale with  Status post liver transplantation  bacteremic again- now with a relatively sensitive klebsiella      ROS:  CONSTITUTIONAL:  Non responsive    Allergies    No Known Allergies    Intolerances        ANTIBIOTICS/RELEVANT:  antimicrobials  cefiderocol IVPB 750 milliGRAM(s) IV Intermittent every 12 hours  minocycline IVPB 200 milliGRAM(s) IV Intermittent every 12 hours  voriconazole IVPB 150 milliGRAM(s) IV Intermittent every 12 hours    immunologic:  epoetin tonja (PROCRIT) Injectable 66121 Unit(s) IV Push every 7 days  filgrastim-sndz (ZARXIO) Injectable 480 MICROGram(s) SubCutaneous every 24 hours    OTHER:  albuterol/ipratropium for Nebulization 3 milliLiter(s) Nebulizer every 6 hours PRN  buDESOnide    Inhalation Suspension 0.5 milliGRAM(s) Inhalation every 12 hours  chlorhexidine 0.12% Liquid 15 milliLiter(s) Oral Mucosa every 12 hours  chlorhexidine 2% Cloths 1 Application(s) Topical <User Schedule>  collagenase Ointment 1 Application(s) Topical daily  FIRST- Mouthwash  BLM 10 milliLiter(s) Swish and Spit every 8 hours PRN  HYDROmorphone  Injectable 0.25 milliGRAM(s) IV Push every 3 hours PRN  insulin lispro (ADMELOG) corrective regimen sliding scale   SubCutaneous every 6 hours  melatonin 5 milliGRAM(s) Oral at bedtime  methylPREDNISolone sodium succinate Injectable 8 milliGRAM(s) IV Push daily  metoprolol tartrate Injectable 2.5 milliGRAM(s) IV Push every 6 hours  midodrine 30 milliGRAM(s) Oral every 8 hours  mirtazapine 7.5 milliGRAM(s) Oral at bedtime  mupirocin 2% Ointment 1 Application(s) Topical every 12 hours  nystatin Powder 1 Application(s) Topical every 12 hours  pantoprazole  Injectable 40 milliGRAM(s) IV Push every 12 hours  phenylephrine    Infusion 4 MICROgram(s)/kG/Min IV Continuous <Continuous>  vasopressin Infusion 0.03 Unit(s)/Min IV Continuous <Continuous>      Objective:  Vital Signs Last 24 Hrs  T(C): 37 (23 Jan 2025 15:00), Max: 37.2 (22 Jan 2025 19:00)  T(F): 98.6 (23 Jan 2025 15:00), Max: 99 (22 Jan 2025 19:00)  HR: 92 (23 Jan 2025 17:00) (85 - 106)  BP: 114/58 (22 Jan 2025 20:00) (114/58 - 114/58)  BP(mean): 78 (22 Jan 2025 20:00) (78 - 78)  RR: 21 (23 Jan 2025 17:00) (15 - 27)  SpO2: 97% (23 Jan 2025 17:00) (90% - 100%)    Parameters below as of 23 Jan 2025 11:00  Patient On (Oxygen Delivery Method): ventilator        PHYSICAL EXAM:  Constitutional:no change  Eyes:DUSTIN, marked icterus  Ear/Nose/Throat: trach	CVC HD catheter  Respiratory: crackles coarse BS BL  Cardiovascular: S1S2  Gastrointestinal:scaphoid, midline vertical open wound packed  ostomy  drains with serosanguinous material  Extremities:no e/e/c  No Lymphadenopathy  IV sites not inflammed.    LABS:                        9.7    0.66  )-----------( 6        ( 23 Jan 2025 14:48 )             27.9     01-23    134[L]  |  102  |  31[H]  ----------------------------<  116[H]  4.3   |  16[L]  |  <0.30[L]    Ca    8.5      23 Jan 2025 12:22  Phos  4.1     01-23  Mg     3.0     01-23    TPro  3.7[L]  /  Alb  3.1[L]  /  TBili  29.9[H]  /  DBili  x   /  AST  37  /  ALT  25  /  AlkPhos  121[H]  01-23    PT/INR - ( 23 Jan 2025 06:21 )   PT: 31.3 sec;   INR: 2.77 ratio         PTT - ( 23 Jan 2025 06:21 )  PTT:68.4 sec  Urinalysis Basic - ( 23 Jan 2025 12:22 )    Color: x / Appearance: x / SG: x / pH: x  Gluc: 116 mg/dL / Ketone: x  / Bili: x / Urobili: x   Blood: x / Protein: x / Nitrite: x   Leuk Esterase: x / RBC: x / WBC x   Sq Epi: x / Non Sq Epi: x / Bacteria: x        MICROBIOLOGY:            RECENT CULTURES:  01-21 @ 08:57  .Blood BLOOD  --  --  --    Growth in aerobic bottle: Klebsiella pneumoniae  See previous culture [id number]  --  01-21 @ 08:27  .Blood BLOOD  Klebsiella pneumoniae  Klebsiella pneumoniae  ESAU    Growth in aerobic and anaerobic bottles: Klebsiella pneumoniae  --  01-19 @ 12:51  Combi-Cath  Stenotrophomonas maltophilia  Stenotrophomonas maltophilia  ESAU    Moderate Stenotrophomonas maltophilia  [employer] consistent with body site  --  01-18 @ 15:45  .Blood BLOOD  --  --  --    Growth in aerobic bottle: Stenotrophomonas maltophilia  See previous culture [id number]  --  01-18 @ 15:43  .Blood BLOOD  Blood Culture PCR  Stenotrophomonas maltophilia  Blood Culture PCR  PCR    Growth in aerobic bottle: Stenotrophomonas maltophilia  Direct identification is available within approximately 3-5  hours either by Blood Panel Multiplexed PCR or Direct  MALDI-TOF. Details: [email or web address]  --  01-17 @ 02:36  .Blood Blood  Stenotrophomonas maltophilia  Stenotrophomonas maltophilia  ESAU    Growth in Myco/F Lytic Bottle: Stenotrophomonas maltophilia  --      RADIOLOGY & ADDITIONAL STUDIES:    < from: Xray Chest 1 View- PORTABLE-Urgent (Xray Chest 1 View- PORTABLE-Urgent .) (01.21.25 @ 05:50) >  FINDINGS:    Interval placement of right IJ dialysis catheter with tip overlying the   SVC. Tracheostomy. Enteric tube overlies the stomach.  Hazy right lower lobe opacity. Small left pleural effusion. No   pneumothorax.  The heart size cannot be accurately assessed on this projection.  No acute osseous abnormalities. Partially visualized upper cervical spine   surgical hardware.    IMPRESSION:  Support devices as described above.    < end of copied text >

## 2025-01-23 NOTE — PROGRESS NOTE ADULT - SUBJECTIVE AND OBJECTIVE BOX
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 seen and examined at bedside earlier today, with son who is a physician at bedside. Off CRRT since ~5pm on 1/22 due to increasing IV vasopressor requirements. Per RN minimal UOP. Unable to obtain ROS due to clinical status     PAST HISTORY  --------------------------------------------------------------------------------  No significant changes to PMH, PSH, FHx, SHx, unless otherwise noted    ALLERGIES & MEDICATIONS  --------------------------------------------------------------------------------  Allergies    No Known Allergies    Intolerances      Standing Inpatient Medications  albumin human 25% IVPB 50 milliLiter(s) IV Intermittent every 6 hours  buDESOnide    Inhalation Suspension 0.5 milliGRAM(s) Inhalation every 12 hours  cefiderocol IVPB 1500 milliGRAM(s) IV Intermittent every 8 hours  chlorhexidine 0.12% Liquid 15 milliLiter(s) Oral Mucosa every 12 hours  chlorhexidine 2% Cloths 1 Application(s) Topical <User Schedule>  collagenase Ointment 1 Application(s) Topical daily  CRRT Treatment    <Continuous>  epoetin tonja (PROCRIT) Injectable 07953 Unit(s) IV Push every 7 days  filgrastim-sndz (ZARXIO) Injectable 480 MICROGram(s) SubCutaneous every 24 hours  ganciclovir IVPB 250 milliGRAM(s) IV Intermittent every 24 hours  insulin lispro (ADMELOG) corrective regimen sliding scale   SubCutaneous every 6 hours  melatonin 5 milliGRAM(s) Oral at bedtime  methylPREDNISolone sodium succinate Injectable 8 milliGRAM(s) IV Push daily  metoprolol tartrate Injectable 2.5 milliGRAM(s) IV Push every 6 hours  midodrine 30 milliGRAM(s) Oral every 8 hours  minocycline IVPB 200 milliGRAM(s) IV Intermittent every 12 hours  mirtazapine 7.5 milliGRAM(s) Oral at bedtime  mupirocin 2% Ointment 1 Application(s) Topical every 12 hours  nystatin Powder 1 Application(s) Topical every 12 hours  pantoprazole  Injectable 40 milliGRAM(s) IV Push every 12 hours  phenylephrine    Infusion 4 MICROgram(s)/kG/Min IV Continuous <Continuous>  Phoxillum Filtration BK 4 / 2.5 5000 milliLiter(s) CRRT <Continuous>  Phoxillum Filtration BK 4 / 2.5 5000 milliLiter(s) CRRT <Continuous>  PrismaSOL Filtration BGK 4 / 2.5 5000 milliLiter(s) CRRT <Continuous>  romiPLOStim Injectable 100 MICROGram(s) SubCutaneous every 7 days  vasopressin Infusion 0.03 Unit(s)/Min IV Continuous <Continuous>  voriconazole IVPB 150 milliGRAM(s) IV Intermittent every 12 hours    PRN Inpatient Medications  albuterol/ipratropium for Nebulization 3 milliLiter(s) Nebulizer every 6 hours PRN  FIRST- Mouthwash  BLM 10 milliLiter(s) Swish and Spit every 8 hours PRN  HYDROmorphone  Injectable 0.25 milliGRAM(s) IV Push every 3 hours PRN      REVIEW OF SYSTEMS  --------------------------------------------------------------------------------  see above    VITALS/PHYSICAL EXAM  --------------------------------------------------------------------------------  T(C): 36.2 (01-23-25 @ 07:00), Max: 37.8 (01-22-25 @ 15:00)  HR: 86 (01-23-25 @ 08:15) (85 - 102)  BP: 114/58 (01-22-25 @ 20:00) (114/58 - 114/58)  RR: 18 (01-23-25 @ 08:15) (15 - 32)  SpO2: 100% (01-23-25 @ 08:15) (90% - 100%)  Wt(kg): --    Weight (kg): 100.5 (01-21-25 @ 20:59)      01-22-25 @ 07:01  -  01-23-25 @ 07:00  --------------------------------------------------------  IN: 3843.3 mL / OUT: 1173 mL / NET: 2670.3 mL    01-23-25 @ 07:01  -  01-23-25 @ 08:41  --------------------------------------------------------  IN: 26 mL / OUT: 0 mL / NET: 26 mL      Physical Exam:  Gen: on vent via tracheostomy.  HEENT: Icterus present  Abdomen: + ileostomy with liquid brown stool, VAC dressing present   MSK: +++LE edema and UE edema.  Skin: Superficial skin ulceration b/l thighs/dark necrotic appearing scrotum.   Vascular: Right IJ temporary dialysis catheter     LABS/STUDIES  --------------------------------------------------------------------------------              9.7    0.67  >-----------<  12       [01-23-25 @ 06:18]              27.8     134  |  101  |  29  ----------------------------<  101      [01-23-25 @ 06:24]  4.2   |  16  |  <0.30        Ca     8.6     [01-23-25 @ 06:24]      Mg     2.8     [01-23-25 @ 06:24]      Phos  3.9     [01-23-25 @ 06:24]    TPro  3.8  /  Alb  3.2  /  TBili  30.4  /  DBili  x   /  AST  42  /  ALT  25  /  AlkPhos  127  [01-23-25 @ 06:24]    PT/INR: PT 31.3 , INR 2.77       [01-23-25 @ 06:21]  PTT: 68.4       [01-23-25 @ 06:21]    Creatinine Trend:  SCr <0.30 [01-23 @ 06:24]  SCr <0.30 [01-23 @ 00:24]  SCr <0.30 [01-22 @ 18:20]  SCr <0.30 [01-22 @ 13:17]  SCr <0.30 [01-22 @ 05:56]    Iron 76, TIBC Unable to calculate Test Repeated, %sat Unable to calculate Test Repeated      [01-12-25 @ 17:45]  Ferritin 867      [01-12-25 @ 17:45]  Vitamin D (25OH) <6.0      [11-21-24 @ 08:32]  TSH 0.68      [12-12-24 @ 12:27]  Lipid: chol 114, , HDL 47, LDL --      [04-24-24 @ 06:48]

## 2025-01-23 NOTE — PROGRESS NOTE ADULT - SUBJECTIVE AND OBJECTIVE BOX
[facility]-- WOUND TEAM -- FOLLOW UP NOTE  --------------------------------------------------------------------------------    24 hour events/subjective:          Diet:  Diet, NPO with Tube Feed:   Tube Feeding Modality: Nasogastric  Vital 1.5 [place] (VITAL1.5RTH)  Total Volume for 24 Hours (mL): 240  Continuous  Starting Tube Feed Rate mL per Hour: 10  Until Goal Tube Feed Rate (mL per Hour): 10  Tube Feed Duration (in Hours): 24  Tube Feed Start Time: 09:30 (01-23-25 @ 09:15)      ROS: pt unable to offer    ALLERGIES & MEDICATIONS  --------------------------------------------------------------------------------    No Known Allergies        STANDING INPATIENT MEDICATIONS  albumin human 25% IVPB 50 milliLiter(s) IV Intermittent every 6 hours  buDESOnide  Inhalation Suspension 0.5 milliGRAM(s) Inhalation every 12 hours  buMETAnide IVPB 3 milliGRAM(s) IV Intermittent once  cefiderocol IVPB 750 milliGRAM(s) IV Intermittent every 12 hours  chlorhexidine 0.12% Liquid 15 milliLiter(s) Oral Mucosa every 12 hours  chlorhexidine 2% Cloths 1 Application(s) Topical <User Schedule>  collagenase Ointment 1 Application(s) Topical daily  epoetin tonja (PROCRIT) Injectable 47234 Unit(s) IV Push every 7 days  filgrastim-sndz (ZARXIO) Injectable 480 MICROGram(s) SubCutaneous every 24 hours  insulin lispro (ADMELOG) corrective regimen sliding scale   SubCutaneous every 6 hours  melatonin 5 milliGRAM(s) Oral at bedtime  methylPREDNISolone sodium succinate Injectable 8 milliGRAM(s) IV Push daily  metoprolol tartrate Injectable 2.5 milliGRAM(s) IV Push every 6 hours  midodrine 30 milliGRAM(s) Oral every 8 hours  minocycline IVPB 200 milliGRAM(s) IV Intermittent every 12 hours  mirtazapine 7.5 milliGRAM(s) Oral at bedtime  mupirocin 2% Ointment 1 Application(s) Topical every 12 hours  nystatin Powder 1 Application(s) Topical every 12 hours  pantoprazole  Injectable 40 milliGRAM(s) IV Push every 12 hours  phenylephrine    Infusion 4 MICROgram(s)/kG/Min IV Continuous <Continuous>  romiPLOStim Injectable 100 MICROGram(s) SubCutaneous every 7 days  vasopressin Infusion 0.03 Unit(s)/Min IV Continuous <Continuous>  voriconazole IVPB 150 milliGRAM(s) IV Intermittent every 12 hours      PRN INPATIENT MEDICATION  albuterol/ipratropium for Nebulization 3 milliLiter(s) Nebulizer every 6 hours PRN  FIRST- Mouthwash  BLM 10 milliLiter(s) Swish and Spit every 8 hours PRN  HYDROmorphone  Injectable 0.25 milliGRAM(s) IV Push every 3 hours PRN        VITALS/PHYSICAL EXAM  --------------------------------------------------------------------------------  T(C): 36.7 (01-23-25 @ 11:00), Max: 37.2 (01-22-25 @ 19:00)  HR: 94 (01-23-25 @ 14:45) (85 - 106)  BP: 114/58 (01-22-25 @ 20:00) (114/58 - 114/58)  RR: 23 (01-23-25 @ 14:30) (15 - 27)  SpO2: 96% (01-23-25 @ 14:45) (90% - 100%)  Wt(kg): --    Weight (kg): 100.5 (01-21-25 @ 20:59)              LABS/ CULTURES/ RADIOLOGY:              9.7    0.66  >-----------<  6        [01-23-25 @ 14:48]              27.9     134  |  102  |  31  ----------------------------<  116      [01-23-25 @ 12:22]  4.3   |  16  |  <0.30        Ca     8.5     [01-23-25 @ 12:22]      Mg     3.0     [01-23-25 @ 12:22]      Phos  4.1     [01-23-25 @ 12:22]    TPro  3.7  /  Alb  3.1  /  TBili  29.9  /  DBili  x   /  AST  37  /  ALT  25  /  AlkPhos  121  [01-23-25 @ 12:22]    PT/INR: PT 31.3 , INR 2.77       [01-23-25 @ 06:21]  PTT: 68.4       [01-23-25 @ 06:21]      CAPILLARY BLOOD GLUCOSE  POCT Blood Glucose.: 96 mg/dL (23 Jan 2025 06:04)  POCT Blood Glucose.: 113 mg/dL (22 Jan 2025 23:59)  POCT Blood Glucose.: 108 mg/dL (22 Jan 2025 19:56)  POCT Blood Glucose.: 81 mg/dL (22 Jan 2025 18:05)      Culture - Blood (collected 01-21-25 @ 08:57)  Source: .Blood BLOOD  Gram Stain (01-22-25 @ 09:59):    Growth in aerobic bottle: Gram Negative Rods  Final Report (01-23-25 @ 14:04):    Growth in aerobic bottle: Klebsiella pneumoniae    See previous culture [id number]    Culture - Blood (collected 01-21-25 @ 08:27)  Source: .Blood BLOOD  Gram Stain (01-22-25 @ 11:20):    Growth in anaerobic bottle: Gram Negative Rods    Growth in aerobic bottle: Gram Negative Rods  Final Report (01-23-25 @ 14:03):    Growth in aerobic and anaerobic bottles: Klebsiella pneumoniae  Organism: Klebsiella pneumoniae (01-23-25 @ 14:03)  Organism: Klebsiella pneumoniae (01-23-25 @ 14:03)      Method Type: ESAU      -  Ampicillin: R >16 These ampicillin results predict results for amoxicillin      -  Ampicillin/Sulbactam: R >16/8      -  Aztreonam: S <=4      -  Cefazolin: I 4      -  Cefepime: S <=2      -  Cefoxitin: S <=8      -  Ceftriaxone: S <=1      -  Ciprofloxacin: R >2      -  Ertapenem: S <=0.5      -  Gentamicin: S <=2      -  Imipenem: S <=1      -  Levofloxacin: R >4      -  Meropenem: S <=1      -  Piperacillin/Tazobactam: SDD 16      -  Tobramycin: R 8      -  Trimethoprim/Sulfamethoxazole: R >2/38    Culture - Blood (collected 01-18-25 @ 15:45)  Source: .Blood BLOOD  Gram Stain (01-20-25 @ 04:05):    Growth in aerobic bottle: Gram Negative Rods  Final Report (01-21-25 @ 18:40):    Growth in aerobic bottle: Stenotrophomonas maltophilia    See previous culture [id number]           [facility]-- WOUND TEAM -- FOLLOW UP NOTE  --------------------------------------------------------------------------------    24 hour events/subjective:    afebrile  trach collar/ remains in ICU  tolerating tf  (+)nichols  family at bedside, all questions answered to their expressed understanding      Diet:  Diet, NPO with Tube Feed:   Tube Feeding Modality: Nasogastric  Vital 1.5 [place] (VITAL1.5RTH)  Total Volume for 24 Hours (mL): 240  Continuous  Starting Tube Feed Rate mL per Hour: 10  Until Goal Tube Feed Rate (mL per Hour): 10  Tube Feed Duration (in Hours): 24  Tube Feed Start Time: 09:30 (01-23-25 @ 09:15)      ROS: pt unable to offer    ALLERGIES & MEDICATIONS  --------------------------------------------------------------------------------    No Known Allergies        STANDING INPATIENT MEDICATIONS  albumin human 25% IVPB 50 milliLiter(s) IV Intermittent every 6 hours  buDESOnide  Inhalation Suspension 0.5 milliGRAM(s) Inhalation every 12 hours  buMETAnide IVPB 3 milliGRAM(s) IV Intermittent once  cefiderocol IVPB 750 milliGRAM(s) IV Intermittent every 12 hours  chlorhexidine 0.12% Liquid 15 milliLiter(s) Oral Mucosa every 12 hours  chlorhexidine 2% Cloths 1 Application(s) Topical <User Schedule>  collagenase Ointment 1 Application(s) Topical daily  epoetin tonja (PROCRIT) Injectable 71334 Unit(s) IV Push every 7 days  filgrastim-sndz (ZARXIO) Injectable 480 MICROGram(s) SubCutaneous every 24 hours  insulin lispro (ADMELOG) corrective regimen sliding scale   SubCutaneous every 6 hours  melatonin 5 milliGRAM(s) Oral at bedtime  methylPREDNISolone sodium succinate Injectable 8 milliGRAM(s) IV Push daily  metoprolol tartrate Injectable 2.5 milliGRAM(s) IV Push every 6 hours  midodrine 30 milliGRAM(s) Oral every 8 hours  minocycline IVPB 200 milliGRAM(s) IV Intermittent every 12 hours  mirtazapine 7.5 milliGRAM(s) Oral at bedtime  mupirocin 2% Ointment 1 Application(s) Topical every 12 hours  nystatin Powder 1 Application(s) Topical every 12 hours  pantoprazole  Injectable 40 milliGRAM(s) IV Push every 12 hours  phenylephrine    Infusion 4 MICROgram(s)/kG/Min IV Continuous <Continuous>  romiPLOStim Injectable 100 MICROGram(s) SubCutaneous every 7 days  vasopressin Infusion 0.03 Unit(s)/Min IV Continuous <Continuous>  voriconazole IVPB 150 milliGRAM(s) IV Intermittent every 12 hours      PRN INPATIENT MEDICATION  albuterol/ipratropium for Nebulization 3 milliLiter(s) Nebulizer every 6 hours PRN  FIRST- Mouthwash  BLM 10 milliLiter(s) Swish and Spit every 8 hours PRN  HYDROmorphone  Injectable 0.25 milliGRAM(s) IV Push every 3 hours PRN        VITALS/PHYSICAL EXAM  --------------------------------------------------------------------------------  T(C): 36.7 (01-23-25 @ 11:00), Max: 37.2 (01-22-25 @ 19:00)  HR: 94 (01-23-25 @ 14:45) (85 - 106)  BP: 114/58 (01-22-25 @ 20:00) (114/58 - 114/58)  RR: 23 (01-23-25 @ 14:30) (15 - 27)  SpO2: 96% (01-23-25 @ 14:45) (90% - 100%)  Wt(kg): --    Weight (kg): 100.5 (01-21-25 @ 20:59)    Guarded but stable, arousable, WN/ WG/ WD, pressors gtt  Progressa bed  HEENT: NC/AT, jaundice, mucosa moist, throat clear, trach collar  Respiratory: Nonlabored w/ equal chest rise  Gastrointestinal: soft NT/ND (+)stoma       VAC in place w/ good seal care as per surger  : (+) nichols, scrotal edema much improved     Scrotum grey dusky skin w/o drainage or blistering   no blistering, odor, tenderness, increased warmth, erythema, crepitus, induration, or fluctuance  Neurology: sedated  Psych: difficult to assess  Musculoskeletal:  pROM, no deformities/ contractures  Vascular: BLE edema equal, no palpable DP pulses  Skin: moist w/ good turgor, anasarca   Bilateral thighs & flank w/ scattered areas of yellow/ pink denuded skin         w/  clear yellow serous weeping    Sacrum/bilateral buttocks unstageable pressure injury    dry adherent black eschar w/ denuded and hyperpigmented skin in periwound    12cm x 10.0cm x 0.1cm,     no drainage or blistering  no blistering, odor, tenderness, increased warmth, erythema, crepitus, induration, or fluctuance        LABS/ CULTURES/ RADIOLOGY:              9.7    0.66  >-----------<  6        [01-23-25 @ 14:48]              27.9     134  |  102  |  31  ----------------------------<  116      [01-23-25 @ 12:22]  4.3   |  16  |  <0.30        Ca     8.5     [01-23-25 @ 12:22]      Mg     3.0     [01-23-25 @ 12:22]      Phos  4.1     [01-23-25 @ 12:22]    TPro  3.7  /  Alb  3.1  /  TBili  29.9  /  DBili  x   /  AST  37  /  ALT  25  /  AlkPhos  121  [01-23-25 @ 12:22]    PT/INR: PT 31.3 , INR 2.77       [01-23-25 @ 06:21]  PTT: 68.4       [01-23-25 @ 06:21]      CAPILLARY BLOOD GLUCOSE  POCT Blood Glucose.: 96 mg/dL (23 Jan 2025 06:04)  POCT Blood Glucose.: 113 mg/dL (22 Jan 2025 23:59)  POCT Blood Glucose.: 108 mg/dL (22 Jan 2025 19:56)  POCT Blood Glucose.: 81 mg/dL (22 Jan 2025 18:05)      Culture - Blood (collected 01-21-25 @ 08:57)  Source: .Blood BLOOD  Gram Stain (01-22-25 @ 09:59):    Growth in aerobic bottle: Gram Negative Rods  Final Report (01-23-25 @ 14:04):    Growth in aerobic bottle: Klebsiella pneumoniae    See previous culture [id number]    Culture - Blood (collected 01-21-25 @ 08:27)  Source: .Blood BLOOD  Gram Stain (01-22-25 @ 11:20):    Growth in anaerobic bottle: Gram Negative Rods    Growth in aerobic bottle: Gram Negative Rods  Final Report (01-23-25 @ 14:03):    Growth in aerobic and anaerobic bottles: Klebsiella pneumoniae  Organism: Klebsiella pneumoniae (01-23-25 @ 14:03)  Organism: Klebsiella pneumoniae (01-23-25 @ 14:03)      Method Type: ESAU      -  Ampicillin: R >16 These ampicillin results predict results for amoxicillin      -  Ampicillin/Sulbactam: R >16/8      -  Aztreonam: S <=4      -  Cefazolin: I 4      -  Cefepime: S <=2      -  Cefoxitin: S <=8      -  Ceftriaxone: S <=1      -  Ciprofloxacin: R >2      -  Ertapenem: S <=0.5      -  Gentamicin: S <=2      -  Imipenem: S <=1      -  Levofloxacin: R >4      -  Meropenem: S <=1      -  Piperacillin/Tazobactam: SDD 16      -  Tobramycin: R 8      -  Trimethoprim/Sulfamethoxazole: R >2/38    Culture - Blood (collected 01-18-25 @ 15:45)  Source: .Blood BLOOD  Gram Stain (01-20-25 @ 04:05):    Growth in aerobic bottle: Gram Negative Rods  Final Report (01-21-25 @ 18:40):    Growth in aerobic bottle: Stenotrophomonas maltophilia    See previous culture [id number]

## 2025-01-23 NOTE — PROGRESS NOTE ADULT - TIME-BASED BILLING (NON-CRITICAL CARE)
Time-based billing (NON-critical care)

## 2025-01-23 NOTE — PROGRESS NOTE ADULT - ASSESSMENT
ASSESSMENT: 74 male w/ a PMHx of HTN, DM, AF, BPH, MASH cirrhosis and HCC s/p OLT on 11/15. Case was uneventful but post-op course has been prolonged and c/b delirium, aspiration, multiple episodes of acute hypoxic respiratory failure requiring reintubation from 11/20-11/24 & 11/24-11/26, AF w/ RVR, ileus requiring NGT, distended colon requiring rectal tube, dysphagia, malnutrition, hypernatremia, fevers, pancytopenia, poorly controlled glucoses, and left brachial VTE. Patient went into severe refractory septic shock on 12/6 w/ blood cultures positive for E. coli s/p s/p ex lap, total abd colectomy, end ileostomy, 3 intentionally retained laparotomy pads for bleeding, Abthera, IABP placement w/ admission to CTICU, Patient required inotropes, Jacqui, and CRRT post-op. s/p closure 12/9. IABP removed 12/10 and transferred back to SICU 12/13. SICU course c/b narrow complex arrythmia w/ ECG showing new ST elevations concerning for posterior wall MI vs vasospasms, cards consulted and started on heparin gtt for empiric ACS tx which was c/b GIB then transitioned to argatroban c/b bleed. TTE revealed LVOT obstruction & TODD.     PLAN:    Neuro:  - Pain: PRN dilaudid  - melatonin and remeron at bedtime (holding seroquel iso TCP)  - Opens eyes intermittently, intermittently following commands, off sedation   - CT head 11/19, 11/21, 11/25, 11/29, 12/5, 12/20, 1/11 negative  - EEG: Mild diffuse cerebral dysfunction that is not specific in etiology. No epileptic discharges recorded. No seizures recorded.  - Holding home xanax, Abilify, Zoloft, Remeron, Lexapro    Resp:  - S/p bedside tracheostomy 12/17  - trach collar during daytime, full support overnight   - c/w chest PT, inhaled bronchodilator, suctioning PRN    CV:  - on kassandra, vaso  - midodrine 30mg q8  - IV metoprolol 2.5 mg q6  - IABP removed 12/10  - TTE 12/6 w/ LVOT obstruction & TODD  - TTE 12/11 shows EF of 55-60%    GI: s/p OLT & colectomy, ileostomy  - NPO in the settingof high vasopressor requirements  - protonix BID  - monitor ALYSSA output  - monitor ileostomy output  - CT 1/11: no obstruction, enteritis, foci of air concerning for early SBO  - holding cyclosporine    /Renal:  - CRRT held in the setting of hemodynamic instability 1/22  - 50cc 25% albumin q6 for 24 hrs  - Monitor BUN/Cr, I&Os, trend UOP  - Monitor scrotal necrosis, maintain nichols at all times  - Bladder scan q12    Heme:  - Filgastrim QD (end 01/25)  - trend H/H, PLTs, coags; Plt goal 20   - hep gtt and argatroban gtt held i/s/o bleed  - PLEX x4, last session 1/9    ID:  - ABX: Voriconazole, minocycline, cefiderocol  - Ganciclovir for CMV   - holding immunosuppresion 2/2 cavitary lesion on CT chest  - 1/11 L apical cavitary lung lesions c/f aspergillus  - BCx 1/18 positive for stenotrophomonoas maltophilia   - BCx 1/21 positive for kliebsiella pneumoniae  - Mouthwash for mouth ulcers    Endo:  - ISS q6 hours  - methylprednisolone 16 qd    Code Status: full code    Disposition: BON Taveras PA-C     Please call [phone] after 6am

## 2025-01-23 NOTE — PROGRESS NOTE ADULT - TIME BILLING
Atrial fibrillation   Encephalopathy.
Coronary artery disease  Liver transplant recipient  Distributive shock
Coronary artery disease  Liver transplant recipient  Distributive shock
Coronary artery disease  Liver transplant recipient  Distributive shock  Severely reduced LV systolic function
Coronary artery disease  Liver transplant recipient  Distributive shock resolved  Severely reduced LV systolic function.
Coronary artery disease  Liver transplant recipient  Distributive shock.
Liver transplant recipient  Post-op PAF  Coronary artery disease.
clinical exam, review of labs, review of recent imaging and discussion of assessment and plan with other team members and ICU team.
face-to-face encounter, review of extensive medical records in this and prior charts, laboratory findings, radiographic and microbiology results; documentation as noted above and discussion of diagnostic impressions and plan with the patient and team
Coronary artery disease  Liver transplant recipient  Distributive shock  IABP
Coronary artery disease  Liver transplant recipient  Distributive shock  IABP
Coronary artery disease  Liver transplant recipient  Distributive shock.
I have seen the patient and reviewed CRRT prescription and flow sheet. Vascular access is functioning well. Patient is tolerating CRRT and the  prescription has been adjusted for optimized control of volume status, uremia and electrolytes. Management of additional metabolic abnormalities/anemia will continue to be addressed on follow up.  Patient's son is at bedside, spoke to him D/w SICU attending.  I was present during and reviewed clinical and lab data as well as assessment and plan as documented  . Please contact if any additional questions with any change in clinical condition or on availability of any additional information or reports.
OLT with MORAIMA, on renal replacement therapy.  Reviewed clinical, lab data  D/w SICU team   Plan: continue CRRT, d/w SICU team.  Once hemodynamic status improves can consider trial of IHD, currently unlikely to tolerate  I have  reviewed CRRT prescription . Vascular access is noted and the  prescription has been adjusted for optimized control of volume status, uremia and electrolytes. Management of additional metabolic abnormalities/anemia will continue to be addressed on follow up.  I was present during and reviewed clinical and lab data as well as assessment and plan as documented . Please contact if any additional questions with any change in clinical condition or on availability of any additional information or reports.
clinical exam, review of labs, review of recent imaging and discussion of assessment and plan with other team members and ICU team.
face-to-face encounter, review of extensive medical records in this and prior charts, laboratory findings, radiographic and microbiology results; documentation as noted above and discussion of diagnostic impressions and plan with the patient and team
Atrial fibrillation   Encephalopathy
Atrial fibrillation   Encephalopathy.
Coronary artery disease  Liver transplant recipient  Distributive shock  Severely reduced LV systolic function.
Liver transplant recipient  Post-op PAF  Coronary artery disease
Atrial fibrillation   Encephalopathy
Coronary artery disease  Liver transplant recipient  Distributive shock
Liver transplant   Atrial fibrillation   Encephalopathy
clinical exam, review of labs, review of recent imaging and discussion of assessment and plan with other team members and ICU team.
clinical exam, review of labs, review of recent imaging and discussion of assessment and plan with other team members and ICU team.
clinical exam, review of labs, review of recent imaging and discussion of assessment and plan with other team members.
face-to-face encounter, review of extensive medical records in this and prior charts, laboratory findings, radiographic and microbiology results; documentation as noted above and discussion of diagnostic impressions and plan with the patient and team
face-to-face encounter, review of extensive medical records in this and prior charts, laboratory findings, radiographic and microbiology results; documentation as noted above and discussion of diagnostic impressions and plan with the patient and team
Clinical, lab data reviewed, discussed with team, agree with plan for dialysis support. Maintain mAP >65 at hemodialysis, If not tolerating will need CRRT  Will follow  I was present during and reviewed clinical and lab data as well as assessment and plan as documented by the house staff as noted. Please contact if any additional questions with any change in clinical condition or on availability of any additional information or reports.
Coronary artery disease  Liver transplant recipient  Distributive shock
Liver recipient with MORAIMA, now non oliguric, off CRRT  Reviewed clinical, lab, I/o data  Patient seen in ICU  Intubated  On vent support  Suggestions:  Monitor I/O, blood chemistry  Monitor for renal function recovery  Will monitor for need for renal replacement therapy  I was present during and reviewed clinical and lab data as well as assessment and plan as documented by the house staff as noted. Please contact if any additional questions with any change in clinical condition or on availability of any additional information or reports.
Liver transplant   Atrial fibrillation   Encephalopathy
OLT with MORAIMA, on renal replacement therapy.  Reviewed clinical, lab data  D/w SICU team   Agree with plan as outlined above with CRRT interruption during day to facilitate Physical therapy and supportive care.   Once hemodynamic status improves can consider trial of IHD, currently unlikely to tolerate  I have  reviewed CRRT prescription . Vascular access is noted and the  prescription has been adjusted for optimized control of volume status, uremia and electrolytes. Management of additional metabolic abnormalities/anemia will continue to be addressed on follow up.  I was present during and reviewed clinical and lab data as well as assessment and plan as documented by the house staff as noted. Please contact if any additional questions with any change in clinical condition or on availability of any additional information or reports.
face-to-face encounter, review of extensive medical records in this and prior charts, laboratory findings, radiographic and microbiology results; documentation as noted above and discussion of diagnostic impressions and plan with the patient and team
Coronary artery disease  Liver transplant recipient  Distributive shock resolved
Coronary artery disease  Liver transplant recipient  Distributive shock.
Liver recipient with MORAIMA  Monitoring off renal replacement therapy.  Reviewed clinical, lab data  Suggestions:  Will monitor closely for need for renal replacement therapy  D/w team  I was present during and reviewed clinical and lab data as well as assessment and plan as documented by the housestaff as noted. Please contact if any additional questions with any change in clinical condition or on availability of any additional information or reports.
Liver transplant   Atrial fibrillation   Encephalopathy
Liver transplant   Atrial fibrillation   Encephalopathy
Liver transplant   Atrial fibrillation   Encephalopathy.
Liver transplant recipient  Post-op PAF  Coronary artery disease
Liver transplant recipient  Post-op PAF  Coronary artery disease
OLT with MORAIMA, on CRRT.  Reviewed clinical, lab data, hemodynamic status, microbiology, antimicrobial regimen.  D/w SICU attending and team  Agree with plan as outlined above with CRRT interruption during day to facilitate Physical therapy and supportive care  I have seen the patient and reviewed CRRT prescription and flowsheet. Vascular access is noted and the  prescription has been adjusted for optimized control of volume status, uremia and electrolytes. Management of additional metabolic abnormalities/anemia will continue to be addressed on follow up.  I was present during and reviewed clinical and lab data as well as assessment and plan as documented by the housestaff as noted. Please contact if any additional questions with any change in clinical condition or on availability of any additional information or reports.
OLT with MORAIMA, on CRRT.  Reviewed clinical, lab data, hemodynamic status, microbiology, antimicrobial regimen.  D/w SICU attending, house staff  Agree with plan as outlined above with CRRT interruption during day to facilitate Physical therapy and supportive care  I have seen the patient and reviewed CRRT prescription and flowsheet. Vascular access is noted and the  prescription has been adjusted for optimized control of volume status, uremia and electrolytes. Management of additional metabolic abnormalities/anemia will continue to be addressed on follow up.  I was present during and reviewed clinical and lab data as well as assessment and plan as documented by the housestaff as noted. Please contact if any additional questions with any change in clinical condition or on availability of any additional information or reports.
clinical exam, review of labs, review of recent imaging and discussion of assessment and plan with other team members and ICU team.
Coronary artery disease  Liver transplant recipient  Distributive shock  Severely reduced LV systolic function
Coronary artery disease  Liver transplant recipient  Distributive shock resolved  Severely reduced LV systolic function.
Coronary artery disease  Liver transplant recipient  Distributive shock resolved  Severely reduced LV systolic function.
Liver transplant   Atrial fibrillation   Encephalopathy
OLT recipient with G[place]iota, azotemia  Reviewed clinical, lab data, I/O  Noted rising BUN  Suggestions:  Will dialyze once vascular access is available  D/w SICU Team  Will follow  I was present during and reviewed clinical and lab data as well as assessment and plan as documented by the house staff as noted. Please contact if any additional questions with any change in clinical condition or on availability of any additional information or reports.
OLT recipient with MORAIMA  Reviewed clinical, lab data, I/O, clinical course, events  Suggestions:  Monitor I/O, blood chemistry,    Will monitor for need for renal replacement therapy  Will follow  I was present during and reviewed clinical and lab data as well as assessment and plan as documented by the house staff as noted. Please contact if any additional questions with any change in clinical condition or on availability of any additional information or reports.
OLT with MORAIMA, on renal replacement therapy.  Reviewed clinical, lab data, I/O, blood chemistry, hemodynamic status, microbiology data, antimicrobial regimen.  D/w SICU attending, house staff  Agree with plan as outlined above with CRRT interruption during day to facilitate Physical therapy and supportive care. Once hemodynamic status improves can consider trial of IHD  I have seen the patient and reviewed CRRT prescription and flowsheet. Vascular access is noted and the  prescription has been adjusted for optimized control of volume status, uremia and electrolytes. Management of additional metabolic abnormalities/anemia will continue to be addressed on follow up.  I was present during and reviewed clinical and lab data as well as assessment and plan as documented by the housestaff as noted. Please contact if any additional questions with any change in clinical condition or on availability of any additional information or reports.
clinical exam, review of labs, review of recent imaging and discussion of assessment and plan with other team members and ICU team.
I have personally seen and examined the patient.  I fully participated in the care of this patient.  I have made amendments to the documentation where necessary, and agree with the history, physical exam and plan as documented  reviewed chart  reviewed laboratory data  reviewed plan of care with resident house staff  doing well overall  hypoxia requiring 2 liters of nasal cannula oxygen  coordinating care with transplant team
I have personally seen and examined the patient.  I fully participated in the care of this patient.  I have made amendments to the documentation where necessary, and agree with the history, physical exam and plan as documented   reviewed chart  reviewed laboratory data  reviewed plan of care with resident house staff  coordinating care with transplant team  adjusting hypertensive medications  came off insulin drip

## 2025-01-23 NOTE — PROGRESS NOTE ADULT - ATTENDING SUPERVISION STATEMENT
Resident
Fellow
Resident
Fellow
Resident
Fellow
Resident
Resident
Fellow
Fellow
Resident
Fellow
Fellow
Resident

## 2025-01-23 NOTE — PROGRESS NOTE ADULT - ASSESSMENT
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 Post op course complicated by acute hypoxic respiratory failure requiring intubation multiple times, most recently on 12/7 with conversion to tracheostomy on 12/17, e.coli bacteremia with RTOR on 12/7 for concern for worsening septic shock and cardiogenic shock s/p balloon pump placement and total abdominal colectomy in setting of ischemic bowel s/p OR on 12/9 for ileostomy creation, and olirugirc MORAIMA requiring CRRT and now intermittent HD.    Clinical course complicated by numerous infectious complications including Stenotrophomonas pneumonia, Enterococcus  faecalis isolated on abdominal drains and Clostridium paraputrificum bacteremia. Also with development of wounds including the sacrum and sloughing of scrotum in setting of loss of blood flow to the testes. Most recently with new cavitary pneumonia with coinciding Stenotrophomonas bacteremia.      BCx (1/11/25) Stenotrophomonas  Trach Culture (1/12/25) Stenotrophomonas  Fungitell 1/13 >500  Aspergillus galactomannan (1/12) 0.02    CT Chest (1/11/25) Cavitary left apical lesions measuring 1.9 cm and 1.5 cm in the region of previously groundglass opacity. Branching nodular opacities in the left upper lobe.     Cavitary pneumonia could be secondary to atypical pathogen although its also possible it is secondary to Stenotrophomonas    #Septic Shock, Cavitary pneumonia, Stenotrophomonas Bacteremia (recurrent)  --Disontinue IV Bactrim and IV Levofloxacin  --Begin Cefiderocol and continue the minocycline  --Hold Voriconazole 200 mg IV Q12H and restart tomorrow at 150mg q 12hr  --Follow up on Voriconazole trough repeat in five-seven days      #Pancytopenia  Likely multifactorial etiology  -- neupogen type agents an option?    #CMV Viremia  CMV resistance testing- please send  may need to switch to foscarnet for suspected resistance  received a dose of IVIG for hypogammaglobulinemia  --Restarted IV ganciclovir in induction dosing adjusted for CRRT will continue  --Repeat  Cytomegalovirus By PCR: 611 IU/mL (01.21.25 @ 12:27)      #Positive Blood Culture (1/23 with klebsiella pneumoniae)  breakthrough from prior abx coverage with quinolone/bactrim  Should be adequately covered by the cefiderocol  please send additional blood cultures in AM      #Liver Transplant Recipient, Prophylactic Antibiotic  --On ganciclovir already as above  --On bactrim already as above    prognosis remains very poor with multiple infections despite minimal immunosuppressive medications     Discussed with SICU team and patient's son    Wes Carrero MD  Can be called via Teams  After 5pm/weekends [phone]      The above assessment and plan were discussed with SICU Team

## 2025-01-23 NOTE — PHARMACOTHERAPY INTERVENTION NOTE - COMMENTS
STERLING BRYANT, 74y Male with Stenotrophomonas maltophila bacteremia, was persistent despite triple therapy with bactrim/minocycline/levofloxacin but now cleared as of 1/21 blood cultures. The 1/21 blood cultures that had growth of GNR came back as K. pneumoniae, which was levofloxacin and bactrim resistant, zosyn SDD but otherwise mostly susceptible to cephalosporins and carbapenems.    Today the patient came off of CRRT, and they are anuric/making minimal urine.    Recommendation(s):  1) Based on the blood culture results, for optimal coverage of the S. maltophila and K. pneumoniae should likely continue with cefiderocol and minocycline combination.  2) Would adjust dose of cefiderocol to 750 mg IV Q12H, and ganciclovir to 1.25 mg/kg IV three times weekly (110 mg TIW based on adjusted body weight).    With kind regards,  Baudilio Aguila, PharmD, BCIDP  Infectious Diseases Clinical Pharmacist  Available on [employer] Teams  .

## 2025-01-23 NOTE — PROGRESS NOTE ADULT - ASSESSMENT
74 male w/ a PMHx of HTN, DM, AF, BPH, MASH cirrhosis and HCC s/p OLT on 11/15. Case was uneventful but post-op course has been prolonged and c/b delirium, aspiration, multiple episodes of acute hypoxic respiratory failure requiring reintubation from 11/20-11/24 & 11/24-11/26, AF w/ RVR, ileus requiring NGT, distended colon requiring rectal tube, dysphagia, malnutrition, hypernatremia, fevers, pancytopenia, poorly controlled glucoses, and left brachial VTE. Patient went into severe refractory septic shock on 12/6 w/ blood cultures positive for E. coli s/p s/p ex lap, total abd colectomy, end ileostomy, 3 intentionally retained laparotomy pads for bleeding, Abthera, IABP placement w/ admission to CTICU, Patient required inotropes, Jacqui, and CRRT post-op. s/p closure 12/9. IABP removed 12/10 and transferred back to SICU 12/13. SICU course c/b narrow complex arrythmia w/ ECG showing new ST elevations concerning for posterior wall MI vs vasospasms, cards consulted and started on heparin gtt for empiric ACS tx which was c/b GIB then transitioned to argatroban c/b bleed. TTE revealed LVOT obstruction & TODD.     Wound Consult requested to assist w/ management of  Sacral/ buttocks unstageable pressure injury   Moisture Associated Dermatitis scrotum/ Buttocks/ Thighs  Scrotal Edema  Ascites       Buttocks/ Sacrum/ Scrotum / thighs  TRIAD BID and prn soiling    Scrotal elevation continue  Scrotal wound as per Urology for definitive plan  Bilateral groin - interDry Ag QD  surgical wound management as per surgical team    Continue w/ attends under pads and Pericare w/ Castro & Ostomy care as per protocol  Continue turning and positioning w/ offloading assistive devices as per protocol  consider NOEMI/PVR's if in line with GOC   Waffle Cushion to chair when oob to chair  Continue w/ alternating air with low air loss surface pressure redistribution bed surface   Moisturize intact skin w/ SWEEN cream BID  Appreciate nutrition consult - pt w/ Increased protein-energy needs and Acute Severe protein calorie malnutrition       multivitamin, vitamin C to assist w/ wound healing       Vital 1.5 TF w/[facility] to promote wound healing  Abx as per SiCU / ID  Care as per SICU, will follow w/ you  Upon discharge f/u as outpatient at [facility] [address] [facility] [phone]  Seen w/ RN and  D/w attng, team   Thank you for this consult  Nights/ Weekends/ Holidays please call:  General Surgery Consult pager ([phone]) for emergencies  Wound PT for multilayer leg wrapping or VAC issues ([phone])  I spent 35  minutes face to face w/ this pt of which more than 50% of the time was spent counseling & coordinating care of this pt.

## 2025-01-23 NOTE — PROGRESS NOTE ADULT - ASSESSMENT
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 Post-op course c/b worsening mental status and acute hypoxic respiratory failure requiring multiple intubations/extubations, currently extubated (as of 11/26/24) and colonic ileus s/p several rounds of Relistor and Neostigmine with NGT and rectal tube currently in place now with septic shock due to ischemic bowel s/p total colectomy with ostomy creation with MORAIMA on CRRT.      1. s/p OLT 11/15/24 with oliguric MORAIMA in setting of septic shock and poor liver function.   - CT AP non-con: Bilateral renal cysts including cysts with peripheral calcification in the left kidney.  - Initiated on CRRT 12/7/24- 12/15/24 at 1AM stopped. s/p IHD 12/18/24 however required levophed.   - Has been back on CRRT but stopped 1/7/25 due to catheter malfunction. Restarted 1/8/25 at 8PM with new catheter. Was on nocturnal CRRT 1/14-1/18. Now back on regular CRRT. CRRT held since 5pm on 1/22 due to increasing IV vasopressor requirements.   - Labs reviewed. Electrolytes acceptable. Pt oliguric and hypotensive on vasopressors. No urgent indication to resume CRRT. Recommend diuretic challenge give Bumex 3mg IVP.   - Monitor labs and I/Os. Avoid nephrotoxins including, ACE/ARB, NSAIDs, contrast, etc. Dose medications as per eGFR.     If you have any questions, please feel free to contact me  Vonnie Schaefer  Nephrology Fellow  Spinal Modulation/Page [phone]  (After 5pm or on weekends please page the on-call fellow)

## 2025-01-23 NOTE — PROGRESS NOTE ADULT - SUBJECTIVE AND OBJECTIVE BOX
24 HOUR EVENTS:  - CCRT discontinued iso hemodynamic instability  - given 2 PRBC, 1 platelet, 250cc 5% albumin  - DC 3% nebs  - 1 dose stress dose steroids with poor response  - per ID, d/c voriconazole today and restart tomorrow 150 BID iso elevated vori level; dc'd levaquin and bactrim and started new antibiotic cefiderocol         NEURO  Exam: intermittently follows   Meds: HYDROmorphone  Injectable 0.25 milliGRAM(s) IV Push every 3 hours PRN Moderate Pain (4 - 6)  melatonin 5 milliGRAM(s) Oral at bedtime  mirtazapine 7.5 milliGRAM(s) Oral at bedtime      RESPIRATORY  RR: 22 (01-23-25 @ 00:00) (15 - 50)  SpO2: 92% (01-23-25 @ 00:00) (90% - 100%)  Exam: intubated  Mechanical Ventilation: Mode: AC/ CMV (Assist Control/ Continuous Mandatory Ventilation), RR (machine): 14, RR (patient): 20, TV (machine): 470, FiO2: 40, PEEP: 5, ITime: 0.9, MAP: 7, PIP: 15  ABG - ( 22 Jan 2025 13:00 )  pH: 7.37  /  pCO2: 33    /  pO2: 158   / HCO3: 19    / Base Excess: -5.5  /  SaO2: 99.9          Meds: albuterol/ipratropium for Nebulization 3 milliLiter(s) Nebulizer every 6 hours PRN Shortness of Breath and/or Wheezing  buDESOnide    Inhalation Suspension 0.5 milliGRAM(s) Inhalation every 12 hours      CARDIOVASCULAR  HR: 90 (01-23-25 @ 00:00) (90 - 105)  BP: 114/58 (01-22-25 @ 20:00) (101/52 - 114/58)  BP(mean): 78 (01-22-25 @ 20:00) (72 - 78)  ABP: 108/50 (01-23-25 @ 00:00) (81/36 - 112/54)  ABP(mean): 74 (01-23-25 @ 00:00) (54 - 78)      Exam:   Cardiac Rhythm:   Perfusion     [ ]Adequate   [x ]Inadequate  Mentation   [ ]Normal       [x ]Reduced  Extremities  [x ]Warm         [ ]Cool  Volume Status [ ]Hypervolemic [x ]Euvolemic [ ]Hypovolemic  Meds: metoprolol tartrate Injectable 2.5 milliGRAM(s) IV Push every 6 hours  midodrine 30 milliGRAM(s) Oral every 8 hours  phenylephrine    Infusion 4 MICROgram(s)/kG/Min IV Continuous <Continuous>      GI/NUTRITION  Exam: soft, NT/ND  Diet: NPO  Meds: pantoprazole  Injectable 40 milliGRAM(s) IV Push every 12 hours      GENITOURINARY  I&O's Detail    01-21 @ 07:01 - 01-22 @ 07:00  --------------------------------------------------------  IN:    Albumin 25%  -  50 mL: 100 mL    Albumin 5%  - 250 mL: 250 mL    Enteral Tube Flush: 220 mL    IV PiggyBack: 250 mL    IV PiggyBack: 1119 mL    Phenylephrine: 837.6 mL    Platelets - Single Donor: 675 mL    Vasopressin: 108 mL    Vital1.5: 70 mL  Total IN: 3629.6 mL    OUT:    Bulb (mL): 350 mL    Ileostomy (mL): 80 mL    Indwelling Catheter - Urethral (mL): 15 mL    Nasogastric/Oral tube (mL): 75 mL    Other (mL): 2774 mL    VAC (Vacuum Assisted Closure) System (mL): 0 mL  Total OUT: 3294 mL    Total NET: 335.6 mL      01-22 @ 07:01 - 01-23 @ 00:27  --------------------------------------------------------  IN:    Albumin 25%  -  50 mL: 100 mL    Albumin 5%  - 250 mL: 250 mL    Enteral Tube Flush: 160 mL    IV PiggyBack: 699.8 mL    Phenylephrine: 980.8 mL    Platelets - Single Donor: 225 mL    PRBCs (Packed Red Blood Cells): 300 mL    Vasopressin: 76.5 mL    Vital1.5: 30 mL  Total IN: 2822.1 mL    OUT:    Bulb (mL): 450 mL    Ileostomy (mL): 0 mL    Indwelling Catheter - Urethral (mL): 5 mL    Other (mL): 663 mL    VAC (Vacuum Assisted Closure) System (mL): 0 mL  Total OUT: 1118 mL    Total NET: 1704.1 mL          01-22    139  |  114[H]  |  16  ----------------------------<  81  2.9[LL]   |  13[L]  |  <0.30[L]    Ca    5.8[LL]      22 Jan 2025 18:20  Phos  2.5     01-22  Mg     1.8     01-22    TPro  3.1[L]  /  Alb  2.2[L]  /  TBili  22.4[H]  /  DBili  x   /  AST  31  /  ALT  17  /  AlkPhos  98  01-22    Meds: albumin human 25% IVPB 50 milliLiter(s) IV Intermittent every 6 hours  magnesium sulfate  IVPB 2 Gram(s) IV Intermittent once  potassium chloride  10 mEq/100 mL IVPB 10 milliEquivalent(s) IV Intermittent every 1 hour      HEMATOLOGIC  Meds: romiPLOStim Injectable 100 MICROGram(s) SubCutaneous every 7 days                          6.2    0.56  )-----------( 4        ( 22 Jan 2025 18:20 )             18.0     PT/INR - ( 22 Jan 2025 05:56 )   PT: 25.8 sec;   INR: 2.26 ratio         PTT - ( 22 Jan 2025 05:56 )  PTT:59.5 sec    INFECTIOUS DISEASES  T(C): 36.7 (01-22-25 @ 23:00), Max: 37.8 (01-22-25 @ 15:00)  Wt(kg): --  WBC Count: 0.56 K/uL (01-22 @ 18:20)  WBC Count: 0.56 K/uL (01-22 @ 05:56)  WBC Count: 0.63 K/uL (01-22 @ 00:35)    Recent Cultures:  Specimen Source: .Blood BLOOD, 01-21 @ 08:57; Results   Growth in aerobic bottle: Klebsiella pneumoniae  See previous culture [id number][!]; Gram Stain:   Growth in aerobic bottle: Gram Negative Rods[!]; Organism: --  Specimen Source: .Blood BLOOD, 01-21 @ 08:27; Results   Growth in anaerobic bottle: Klebsiella pneumoniae  Growth in aerobic bottle: Gram Negative Rods[!]; Gram Stain:   Growth in anaerobic bottle: Gram Negative Rods  Growth in aerobic bottle: Gram Negative Rods[!]; Organism: --  Specimen Source: Combi-Cath, 01-19 @ 12:51; Results   Moderate Stenotrophomonas maltophilia  Commensal charity consistent with body site[!]; Gram Stain:   No polymorphonuclear leukocytes per low power field  No Squamous epithelial cells per low power field  Rare Yeast like cells per oil power field[!]; Organism: Stenotrophomonas maltophilia[!]  Specimen Source: .Blood BLOOD, 01-18 @ 15:45; Results   Growth in aerobic bottle: Stenotrophomonas maltophilia  See previous culture [id number][!]; Gram Stain:   Growth in aerobic bottle: Gram Negative Rods[!]; Organism: --  Specimen Source: .Blood BLOOD, 01-18 @ 15:43; Results   Growth in aerobic bottle: Stenotrophomonas maltophilia  Direct identification is available within approximately 3-5  hours either by Blood Panel Multiplexed PCR or Direct  MALDI-TOF. Details: [email or web address][!]; Gram Stain:   Growth in aerobic bottle: Gram Negative Rods[!]; Organism: Blood Culture PCR  Stenotrophomonas maltophilia[!]  Specimen Source: .Blood Blood, 01-17 @ 02:36; Results   Growth in Myco/F Lytic Bottle: Stenotrophomonas maltophilia[!]; Gram Stain:   Growth in Myco/F Lytic Bottle: Gram Negative Rods[!]; Organism: Stenotrophomonas maltophilia[!]    Meds: cefiderocol IVPB 1500 milliGRAM(s) IV Intermittent every 8 hours  epoetin tonja (PROCRIT) Injectable 20448 Unit(s) IV Push every 7 days  filgrastim-sndz (ZARXIO) Injectable 480 MICROGram(s) SubCutaneous every 24 hours  ganciclovir IVPB 250 milliGRAM(s) IV Intermittent every 24 hours  minocycline IVPB 200 milliGRAM(s) IV Intermittent every 12 hours  voriconazole IVPB 150 milliGRAM(s) IV Intermittent every 12 hours      ENDOCRINE  Capillary Blood Glucose    Meds: insulin lispro (ADMELOG) corrective regimen sliding scale   SubCutaneous every 6 hours  methylPREDNISolone sodium succinate Injectable 8 milliGRAM(s) IV Push daily  vasopressin Infusion 0.03 Unit(s)/Min IV Continuous <Continuous>      ACCESS DEVICES:  [ ] Peripheral IV  [ x] Central Venous Line		[ ] R	[ ] L	[ ] IJ	[ ] Fem	[ ] SC	Placed:   [ x] Arterial Line			[ ] R	[ ] L	[ ] Fem	[ ] Rad	[ ] Ax	Placed:   [ ] PICC:					[ ] Mediport  [x ] Urinary Catheter, Date Placed:   [ ] Necessity of urinary, arterial, and venous catheters discussed    OTHER MEDICATIONS:  chlorhexidine 0.12% Liquid 15 milliLiter(s) Oral Mucosa every 12 hours  chlorhexidine 2% Cloths 1 Application(s) Topical <User Schedule>  collagenase Ointment 1 Application(s) Topical daily  CRRT Treatment    <Continuous>  FIRST- Mouthwash  BLM 10 milliLiter(s) Swish and Spit every 8 hours PRN  mupirocin 2% Ointment 1 Application(s) Topical every 12 hours  nystatin Powder 1 Application(s) Topical every 12 hours  Phoxillum Filtration BK 4 / 2.5 5000 milliLiter(s) CRRT <Continuous>  Phoxillum Filtration BK 4 / 2.5 5000 milliLiter(s) CRRT <Continuous>  PrismaSOL Filtration BGK 4 / 2.5 5000 milliLiter(s) CRRT <Continuous>      IMAGING:

## 2025-01-24 NOTE — PROGRESS NOTE ADULT - ASSESSMENT
ASSESSMENT: 74 male w/ a PMHx of HTN, DM, AF, BPH, MASH cirrhosis and HCC s/p OLT on 11/15. Case was uneventful but post-op course has been prolonged and c/b delirium, aspiration, multiple episodes of acute hypoxic respiratory failure requiring reintubation from 11/20-11/24 & 11/24-11/26, AF w/ RVR, ileus requiring NGT, distended colon requiring rectal tube, dysphagia, malnutrition, hypernatremia, fevers, pancytopenia, poorly controlled glucoses, and left brachial VTE. Patient went into severe refractory septic shock on 12/6 w/ blood cultures positive for E. coli s/p s/p ex lap, total abd colectomy, end ileostomy, 3 intentionally retained laparotomy pads for bleeding, Abthera, IABP placement w/ admission to CTICU, Patient required inotropes, Jacqui, and CRRT post-op. s/p closure 12/9. IABP removed 12/10 and transferred back to SICU 12/13. SICU course c/b narrow complex arrythmia w/ ECG showing new ST elevations concerning for posterior wall MI vs vasospasms, cards consulted and started on heparin gtt for empiric ACS tx which was c/b GIB then transitioned to argatroban c/b bleed. TTE revealed LVOT obstruction & TDOD.     PLAN:    Neuro:  - Pain: PRN dilaudid  - melatonin and remeron at bedtime (holding seroquel iso TCP)  - Opens eyes intermittently, intermittently following commands, off sedation   - CT head 11/19, 11/21, 11/25, 11/29, 12/5, 12/20, 1/11 negative  - EEG: Mild diffuse cerebral dysfunction that is not specific in etiology. No epileptic discharges recorded. No seizures recorded.  - Holding home xanax, Abilify, Zoloft, Remeron, Lexapro    Resp:  - S/p bedside tracheostomy 12/17  - trach collar during daytime, full support overnight   - c/w chest PT, inhaled bronchodilator, suctioning PRN    CV:  - on kassandra, vaso  - midodrine 30mg q8  - IV metoprolol 2.5 mg q6  - IABP removed 12/10  - TTE 12/6 w/ LVOT obstruction & TODD  - TTE 12/11 shows EF of 55-60%    GI: s/p OLT & colectomy, ileostomy  - NPO with TF at 10  - protonix BID  - monitor ALYSSA output  - monitor ileostomy output  - CT 1/11: no obstruction, enteritis, foci of air concerning for early SBO    /Renal:  - CRRT held in the setting of hemodynamic instability 1/22  - 50cc 25% albumin q6  - Monitor BUN/Cr, I&Os, trend UOP  - Monitor scrotal necrosis, maintain nichols at all times  - Bladder scan q12    Heme:  - Filgrastim QD (end 01/25)  - trend H/H, PLTs, coags; Plt goal 20   - hep gtt and argatroban gtt held i/s/o bleed  - PLEX x4, last session 1/9    ID:  - ABX: Voriconazole, minocycline, cefiderocol  - Ganciclovir for CMV   - holding immunosuppresion 2/2 cavitary lesion on CT chest  - 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

## 2025-01-24 NOTE — PROGRESS NOTE ADULT - PROVIDER SPECIALTY LIST ADULT
Cardiology
Colorectal Surgery
Colorectal Surgery
Critical Care
Neurology
Neurology
SICU
Surgery
Transplant ID
Transplant Nephrology
Transplant Surgery
Urology
Wound Care
Wound Care
Cardiology
Critical Care
Electrophysiology
Heme/Onc
Pharmacy
SICU
Surgery
Transplant ID
Transplant Nephrology
Transplant Surgery
Urology
Urology
Wound Care
Cardiology
Critical Care
Electrophysiology
Heme/Onc
SICU
Surgery
Transplant ID
Transplant Nephrology
Transplant Surgery
Urology
Wound Care
Cardiology
SICU
Transplant ID
Transplant Nephrology
Transplant Surgery
Urology
Wound Care
Cardiology
Cardiology
SICU
Surgery
Transplant ID
Transplant Nephrology
Transplant Surgery
Urology
Urology
Cardiology
Heart Failure
Neurology
Transplant ID
Transplant Surgery
Cardiology
Transplant ID
Transplant Nephrology
Heart Failure

## 2025-01-24 NOTE — CHART NOTE - NSCHARTNOTESELECT_GEN_ALL_CORE
Apheresis Note/Blood Bank
Apheresis/Blood Bank
Apheresis/Blood Bank
Arrhythmia/Event Note
CRRT/Event Note
Event Note
GI/Hep/Event Note
Neurology/Event Note
Nutrition Services
SICU Attending
Urology
Urology/Off Service Note
Apheresis Note/Blood Bank
Event Note
Hematology/Event Note
Hematology/Event Note
Hemodialysis/Event Note
Hemodialysis/Event Note
ISTOP 11/20/Event Note
Nutrition Services
POC/Event Note
Speech and Swallow
Speech and swallow
Urology chart note.
Apheresis Note/Blood Bank

## 2025-01-24 NOTE — DISCHARGE NOTE FOR THE EXPIRED PATIENT - HOSPITAL COURSE
74 male w/ a PMHx of HTN, DM, AF, BPH, MASH cirrhosis and HCC s/p OLT on 11/15. Case was uneventful but post-op course has been prolonged and c/b delirium, aspiration, multiple episodes of acute hypoxic respiratory failure requiring reintubation from 11/20-11/24 & 11/24-11/26, AF w/ RVR, ileus requiring NGT, distended colon requiring rectal tube, dysphagia, malnutrition, hypernatremia, fevers, pancytopenia, poorly controlled glucoses, and left brachial VTE. Patient went into severe refractory septic shock on 12/6 w/ blood cultures positive for E. coli s/p s/p ex lap, total abd colectomy, end ileostomy, 3 intentionally retained laparotomy pads for bleeding, Abthera, IABP placement w/ admission to CTICU, Patient required inotropes, Jacqui, and CRRT post-op. s/p closure 12/9. IABP removed 12/10 and transferred back to SICU 12/13. SICU course c/b narrow complex arrythmia w/ ECG showing new ST elevations concerning for posterior wall MI vs vasospasms, cards consulted and started on heparin gtt for empiric ACS tx which was c/b GIB then transitioned to argatroban c/b bleed. TTE revealed LVOT obstruction & TODD. Overall hospital course c/b worsening liver failure despite transplant, labile hemodynamics requiring high doses of pressors and persistent polymicrobial bacteremia.     Patient with acute drop in pressure to SBP 30s, O2 70%, . Patient lost pulses and went into Vtach arrest 02:32 and chest compressions were initiated. Patient underwent ACLS x4 without any shockable rhythm, time of death called 02:45.    74 male w/ a PMHx of HTN, DM, AF, BPH, MASH cirrhosis and HCC s/p OLT on 11/15. Case was uneventful but post-op course has been prolonged and c/b delirium, aspiration, multiple episodes of acute hypoxic respiratory failure requiring reintubation from 11/20-11/24 & 11/24-11/26, AF w/ RVR, ileus requiring NGT, distended colon requiring rectal tube, dysphagia, malnutrition, hypernatremia, fevers, pancytopenia, poorly controlled glucoses, and left brachial VTE. Patient went into severe refractory septic shock on 12/6 w/ blood cultures positive for E. coli s/p s/p ex lap, total abd colectomy, end ileostomy, 3 intentionally retained laparotomy pads for bleeding, Abthera, IABP placement w/ admission to CTICU, Patient required inotropes, Jacqui, and CRRT post-op. s/p closure 12/9. IABP removed 12/10 and transferred back to SICU 12/13. SICU course c/b narrow complex arrythmia w/ ECG showing new ST elevations concerning for posterior wall MI vs vasospasms, cards consulted and started on heparin gtt for empiric ACS tx which was c/b GIB then transitioned to argatroban c/b bleed. TTE revealed LVOT obstruction & TODD. Overall hospital course c/b worsening liver failure despite transplant, labile hemodynamics requiring high doses of pressors and persistent polymicrobial bacteremia.     Patient with acute drop in pressure to SBP 30s, O2 70%, . Patient lost pulses and went into Vtach arrest 02:32 and chest compressions were initiated. Patient underwent ACLS x4 without any shockable rhythm, time of death called 02:45.

## 2025-01-24 NOTE — PROGRESS NOTE ADULT - SUBJECTIVE AND OBJECTIVE BOX
INTERVAL EVENTS:  - restarted TF trickle    - holding off on CRRT for another day given improved kassandra   - s/p 1u plt this AM per transplant --> hold off on platelet transfusion unless active ssx bleeding   - repeat blood cx from 1/21 growing klebsiella, fungal cx growing steno; f/u ID recs   - trach collar PRN as tolerated    -got 3mg bumex @2pm --> no response   - repeat blood cx due to new Klebsiella result   - f/u ID re: Klebisella coverage    HISTORY:    24 HOUR EVENTS:    NEURO  RASS (if intubated): 		[facility] ICU (if concern for delirium):  Exam: grimaces to pain, tracks  Meds: HYDROmorphone  Injectable 0.25 milliGRAM(s) IV Push every 3 hours PRN Moderate Pain (4 - 6)  melatonin 5 milliGRAM(s) Oral at bedtime  mirtazapine 7.5 milliGRAM(s) Oral at bedtime      RESPIRATORY  RR: 19 (01-24-25 @ 00:30) (17 - 27)  SpO2: 97% (01-24-25 @ 00:30) (90% - 100%)  Wt(kg): --  Exam: clear to auscultation bilaterally  Mechanical Ventilation: Mode: AC/ CMV (Assist Control/ Continuous Mandatory Ventilation), RR (machine): 14, RR (patient): 23, TV (machine): 470, FiO2: 40, PEEP: 5, ITime: 0.9, MAP: 9.6, PIP: 18  ABG - ( 23 Jan 2025 06:15 )  pH: 7.32  /  pCO2: 35    /  pO2: 80    / HCO3: 18    / Base Excess: -7.4  /  SaO2: 97.4    Lactate: x                Meds: albuterol/ipratropium for Nebulization 3 milliLiter(s) Nebulizer every 6 hours PRN Shortness of Breath and/or Wheezing  buDESOnide    Inhalation Suspension 0.5 milliGRAM(s) Inhalation every 12 hours      CARDIOVASCULAR  HR: 89 (01-24-25 @ 00:30) (85 - 106)  BP: 96/55 (01-24-25 @ 00:30) (90/52 - 105/51)  BP(mean): 73 (01-24-25 @ 00:30) (63 - 75)  ABP: 95/39 (01-24-25 @ 00:30) (85/38 - 121/60)  ABP(mean): 58 (01-24-25 @ 00:30) (56 - 86)  Wt(kg): --  CVP(cm H2O): --      Exam: regular rate and rhythm  Cardiac Rhythm: NSR  Perfusion     [x]Adequate   [ ]Inadequate  Mentation   [x]Normal       [ ]Reduced  Extremities  [x]Warm         [ ]Cool  Volume Status [ ]Hypervolemic [ ]Euvolemic [ ]Hypovolemic  Meds: metoprolol tartrate Injectable 2.5 milliGRAM(s) IV Push every 6 hours  midodrine 30 milliGRAM(s) Oral every 8 hours  phenylephrine    Infusion 4 MICROgram(s)/kG/Min IV Continuous <Continuous>      GI/NUTRITION  Exam: soft, nontender, nondistended  Diet:  trickle feeds  Meds: pantoprazole  Injectable 40 milliGRAM(s) IV Push every 12 hours      GENITOURINARY  I&O's Detail    01-22 @ 07:01 - 01-23 @ 07:00  --------------------------------------------------------  IN:    Albumin 25%  -  50 mL: 200 mL    Albumin 5%  - 250 mL: 250 mL    Enteral Tube Flush: 260 mL    IV PiggyBack: 899.7 mL    IV PiggyBack: 150 mL    Phenylephrine: 1195.6 mL    Platelets - Single Donor: 450 mL    PRBCs (Packed Red Blood Cells): 300 mL    Vasopressin: 108 mL    Vital1.5: 30 mL  Total IN: 3843.3 mL    OUT:    Bulb (mL): 500 mL    Ileostomy (mL): 0 mL    Indwelling Catheter - Urethral (mL): 10 mL    Other (mL): 663 mL    VAC (Vacuum Assisted Closure) System (mL): 0 mL  Total OUT: 1173 mL    Total NET: 2670.3 mL      01-23 @ 07:01 - 01-24 @ 00:39  --------------------------------------------------------  IN:    Albumin 25%  -  50 mL: 100 mL    Enteral Tube Flush: 30 mL    IV PiggyBack: 433.3 mL    Phenylephrine: 294.7 mL    Platelets - Single Donor: 450 mL    Vasopressin: 72 mL    Vital1.5: 100 mL  Total IN: 1480 mL    OUT:    Indwelling Catheter - Urethral (mL): 10 mL  Total OUT: 10 mL    Total NET: 1470 mL          01-23    142  |  107  |  33[H]  ----------------------------<  112[H]  4.5   |  16[L]  |  <0.30[L]    Ca    9.0      23 Jan 2025 17:48  Phos  4.2     01-23  Mg     2.9     01-23    TPro  4.0[L]  /  Alb  3.3  /  TBili  30.2[H]  /  DBili  x   /  AST  33  /  ALT  23  /  AlkPhos  126[H]  01-23    Meds:     HEMATOLOGIC  Meds: romiPLOStim Injectable 200 MICROGram(s) SubCutaneous every 7 days                          9.6    0.91  )-----------( 25       ( 23 Jan 2025 17:48 )             27.5     PT/INR - ( 23 Jan 2025 06:21 )   PT: 31.3 sec;   INR: 2.77 ratio         PTT - ( 23 Jan 2025 06:21 )  PTT:68.4 sec    INFECTIOUS DISEASES  T(C): 37 (01-23-25 @ 15:00), Max: 37 (01-23-25 @ 15:00)  Wt(kg): --  WBC Count: 0.91 K/uL (01-23 @ 17:48)  WBC Count: 0.66 K/uL (01-23 @ 14:48)  WBC Count: 0.67 K/uL (01-23 @ 06:18)    Recent Cultures:  Specimen Source: .Blood BLOOD, 01-21 @ 08:57; Results   Growth in aerobic bottle: Klebsiella pneumoniae  See previous culture [id number][!]; Gram Stain:   Growth in aerobic bottle: Gram Negative Rods[!]; Organism: --  Specimen Source: .Blood BLOOD, 01-21 @ 08:27; Results   Growth in aerobic and anaerobic bottles: Klebsiella pneumoniae[!]; Gram Stain:   Growth in anaerobic bottle: Gram Negative Rods  Growth in aerobic bottle: Gram Negative Rods[!]; Organism: Klebsiella pneumoniae[!]  Specimen Source: Combi-Cath, 01-19 @ 12:51; Results   Moderate Stenotrophomonas maltophilia  Commensal charity consistent with body site[!]; Gram Stain:   No polymorphonuclear leukocytes per low power field  No Squamous epithelial cells per low power field  Rare Yeast like cells per oil power field[!]; Organism: Stenotrophomonas maltophilia[!]  Specimen Source: .Blood BLOOD, 01-18 @ 15:45; Results   Growth in aerobic bottle: Stenotrophomonas maltophilia  See previous culture [id number][!]; Gram Stain:   Growth in aerobic bottle: Gram Negative Rods[!]; Organism: --  Specimen Source: .Blood BLOOD, 01-18 @ 15:43; Results   Growth in aerobic bottle: Stenotrophomonas maltophilia  Direct identification is available within approximately 3-5  hours either by Blood Panel Multiplexed PCR or Direct  MALDI-TOF. Details: [email or web address][!]; Gram Stain:   Growth in aerobic bottle: Gram Negative Rods[!]; Organism: Blood Culture PCR  Stenotrophomonas maltophilia[!]  Specimen Source: .Blood Blood, 01-17 @ 02:36; Results   Growth in Myco/F Lytic Bottle: Stenotrophomonas maltophilia[!]; Gram Stain:   Growth in Myco/F Lytic Bottle: Gram Negative Rods[!]; Organism: Stenotrophomonas maltophilia[!]    Meds: cefiderocol IVPB 750 milliGRAM(s) IV Intermittent every 12 hours  epoetin tonja (PROCRIT) Injectable 71566 Unit(s) IV Push every 7 days  filgrastim-sndz (ZARXIO) Injectable 480 MICROGram(s) SubCutaneous every 24 hours  ganciclovir IVPB 110 milliGRAM(s) IV Intermittent <User Schedule>  minocycline IVPB 200 milliGRAM(s) IV Intermittent every 12 hours  voriconazole IVPB 150 milliGRAM(s) IV Intermittent every 12 hours      ENDOCRINE  Capillary Blood Glucose    Meds: insulin lispro (ADMELOG) corrective regimen sliding scale   SubCutaneous every 6 hours  methylPREDNISolone sodium succinate Injectable 8 milliGRAM(s) IV Push daily  vasopressin Infusion 0.03 Unit(s)/Min IV Continuous <Continuous>      OTHER MEDICATIONS:  chlorhexidine 0.12% Liquid 15 milliLiter(s) Oral Mucosa every 12 hours  chlorhexidine 2% Cloths 1 Application(s) Topical <User Schedule>  collagenase Ointment 1 Application(s) Topical daily  FIRST- Mouthwash  BLM 10 milliLiter(s) Swish and Spit every 8 hours PRN  mupirocin 2% Ointment 1 Application(s) Topical every 12 hours  nystatin Powder 1 Application(s) Topical every 12 hours      IMAGING:

## 2025-01-24 NOTE — CHART NOTE - NSCHARTNOTEFT_GEN_A_CORE
Patient acutely became hemodynamically unstable, MAPs in 20s, despite being on triple pressors. Patient went into VTach arrest, converted into PEA @02:32 and chest compressions were initiated. Patient underwent 4 rounds of ACLS w/ a push of lidocaine for idioventricular rhythm. Time of death was called at 02:45 by Dr. Joni Gutiérrez.     Transplant attending Dr. Dagher made aware.         Nadia Taveras PA-C  #[id number] Patient acutely became hemodynamically unstable, MAPs in 20s, despite being on triple pressors. Patient went into VTach arrest, converted into PEA @02:32 and chest compressions were initiated. Patient underwent 4 rounds of ACLS.  Time of death was called at 02:45 by Dr. Joni Gutiérrez.     Transplant attending Dr. Dagher made aware.         Nadia Taveras PA-C  #[id number] Patient acutely became hemodynamically unstable, MAPs in 20s, despite being on triple pressors. Patient went into VTach arrest, converted into PEA @02:32 and chest compressions were initiated. Patient underwent 4 rounds of ACLS.  Time of death was called at 02:45 by Dr. Joni Gutiérrez.     Son-in law at bedside and notified rest of family.  Transplant attending Dr. Dagher made aware.         Nadia Taveras PA-C  #[id number]

## 2025-01-24 NOTE — PROGRESS NOTE ADULT - NUTRITIONAL ASSESSMENT
This patient has been assessed with a concern for Malnutrition and has been determined to have a diagnosis/diagnoses of Severe protein-calorie malnutrition.  
This patient has been assessed with a concern for Malnutrition and has been determined to have a diagnosis/diagnoses of Severe protein-calorie malnutrition.    This patient is being managed with:   Diet NPO after Midnight-     NPO Start Date: 25-Dec-2024   NPO Start Time: 23:59  Entered: Dec 25 2024  7:08PM    Diet NPO with Tube Feed-  Tube Feeding Modality: Nasogastric  Nepro with Carb Steady (NEPRORTH)  Total Volume for 24 Hours (mL): 1320  Continuous  Starting Tube Feed Rate {mL per Hour}: 30  Increase Tube Feed Rate by (mL): 10     Every 4 hours  Until Goal Tube Feed Rate (mL per Hour): 55  Tube Feed Duration (in Hours): 24  Tube Feed Start Time: 20:00  Entered: Dec 24 2024  2:17AM  
This patient has been assessed with a concern for Malnutrition and has been determined to have a diagnosis/diagnoses of Severe protein-calorie malnutrition.    This patient is being managed with:   Diet NPO with Tube Feed-  Tube Feeding Modality: Nasogastric  Glucerna 1.5 [facility] (GLUCERNA1.5RTH)  Total Volume for 24 Hours (mL): 720  Continuous  Starting Tube Feed Rate {mL per Hour}: 30     Every 4 hours  Until Goal Tube Feed Rate (mL per Hour): 30  Tube Feed Duration (in Hours): 24  Tube Feed Start Time: 11:00  Entered: Dec  1 2024  9:20PM  
This patient has been assessed with a concern for Malnutrition and has been determined to have a diagnosis/diagnoses of Severe protein-calorie malnutrition.    This patient is being managed with:   Diet NPO with Tube Feed-  Tube Feeding Modality: Nasogastric  Nepro with Carb Steady (NEPRORTH)  Total Volume for 24 Hours (mL): 1200  Continuous  Starting Tube Feed Rate {mL per Hour}: 20  Increase Tube Feed Rate by (mL): 10     Every 4 hours  Until Goal Tube Feed Rate (mL per Hour): 50  Tube Feed Duration (in Hours): 24  Tube Feed Start Time: 10:00  Entered: Dec 28 2024 10:13AM  
This patient has been assessed with a concern for Malnutrition and has been determined to have a diagnosis/diagnoses of Severe protein-calorie malnutrition.    This patient is being managed with:   Diet NPO with Tube Feed-  Tube Feeding Modality: Nasogastric  Nepro with Carb Steady (NEPRORTH)  Total Volume for 24 Hours (mL): 1200  Continuous  Starting Tube Feed Rate {mL per Hour}: 40  Increase Tube Feed Rate by (mL): 10     Every 4 hours  Until Goal Tube Feed Rate (mL per Hour): 50  Tube Feed Duration (in Hours): 24  Tube Feed Start Time: 20:00  Entered: Dec 14 2024  8:50PM  
This patient has been assessed with a concern for Malnutrition and has been determined to have a diagnosis/diagnoses of Severe protein-calorie malnutrition.    This patient is being managed with:   Diet NPO with Tube Feed-  Tube Feeding Modality: Nasogastric  Nepro with Carb Steady (NEPRORTH)  Total Volume for 24 Hours (mL): 1200  Continuous  Starting Tube Feed Rate {mL per Hour}: 40  Increase Tube Feed Rate by (mL): 10     Every 4 hours  Until Goal Tube Feed Rate (mL per Hour): 50  Tube Feed Duration (in Hours): 24  Tube Feed Start Time: 20:00  Entered: Dec 14 2024  8:50PM  
This patient has been assessed with a concern for Malnutrition and has been determined to have a diagnosis/diagnoses of Severe protein-calorie malnutrition.    This patient is being managed with:   Diet NPO with Tube Feed-  Tube Feeding Modality: Nasogastric  Nepro with Carb Steady (NEPRORTH)  Total Volume for 24 Hours (mL): 1320  Continuous  Starting Tube Feed Rate {mL per Hour}: 55     Every 4 hours  Until Goal Tube Feed Rate (mL per Hour): 55  Tube Feed Duration (in Hours): 24  Tube Feed Start Time: 10:00  Shakira TF     Qty per Day:  3  Entered: Dec 31 2024  1:49PM  
This patient has been assessed with a concern for Malnutrition and has been determined to have a diagnosis/diagnoses of Severe protein-calorie malnutrition.    This patient is being managed with:   Diet NPO with Tube Feed-  Tube Feeding Modality: Nasogastric  Nepro with Carb Steady (NEPRORTH)  Total Volume for 24 Hours (mL): 1320  Continuous  Starting Tube Feed Rate {mL per Hour}: 55     Every 4 hours  Until Goal Tube Feed Rate (mL per Hour): 55  Tube Feed Duration (in Hours): 24  Tube Feed Start Time: 10:00  Shakira TF     Qty per Day:  3  Entered: Dec 31 2024  1:49PM  
This patient has been assessed with a concern for Malnutrition and has been determined to have a diagnosis/diagnoses of Severe protein-calorie malnutrition.    This patient is being managed with:   Diet NPO with Tube Feed-  Tube Feeding Modality: Nasogastric  Nepro with Carb Steady (NEPRORTH)  Total Volume for 24 Hours (mL): 1320  Continuous  Starting Tube Feed Rate {mL per Hour}: 55  Until Goal Tube Feed Rate (mL per Hour): 55  Tube Feed Duration (in Hours): 24  Entered: Dec 16 2024  2:05PM  
This patient has been assessed with a concern for Malnutrition and has been determined to have a diagnosis/diagnoses of Severe protein-calorie malnutrition.    This patient is being managed with:   Diet NPO with Tube Feed-  Tube Feeding Modality: Nasogastric  Nepro with Carb Steady (NEPRORTH)  Total Volume for 24 Hours (mL): 1320  Continuous  Starting Tube Feed Rate {mL per Hour}: 55  Until Goal Tube Feed Rate (mL per Hour): 55  Tube Feed Duration (in Hours): 24  Entered: Dec 22 2024  9:55AM  
This patient has been assessed with a concern for Malnutrition and has been determined to have a diagnosis/diagnoses of Severe protein-calorie malnutrition.    This patient is being managed with:   Diet NPO with Tube Feed-  Tube Feeding Modality: Nasogastric  Vital 1.5 [facility] (VITAL1.5RTH)  Total Volume for 24 Hours (mL): 1560  Continuous  Starting Tube Feed Rate {mL per Hour}: 65     Every 4 hours  Until Goal Tube Feed Rate (mL per Hour): 65  Tube Feed Duration (in Hours): 24  Tube Feed Start Time: 10:30  Alfredo(7 Gm Arginine/7 Gm Glut/1.2 Gm HMB     Qty per Day:  2  Banatrol TF     Qty per Day:  3  Entered: Giancarlo  3 2025 10:22AM  
This patient has been assessed with a concern for Malnutrition and has been determined to have a diagnosis/diagnoses of Severe protein-calorie malnutrition.    This patient is being managed with:   Diet NPO with Tube Feed-  Tube Feeding Modality: Nasogastric  Vital 1.5 [facility] (VITAL1.5RTH)  Total Volume for 24 Hours (mL): 240  Continuous  Starting Tube Feed Rate {mL per Hour}: 10  Until Goal Tube Feed Rate (mL per Hour): 10  Tube Feed Duration (in Hours): 24  Tube Feed Start Time: 09:30  Entered: Jan 23 2025  9:15AM  
This patient has been assessed with a concern for Malnutrition and has been determined to have a diagnosis/diagnoses of Severe protein-calorie malnutrition.    This patient is being managed with:   Diet NPO with Tube Feed-  Tube Feeding Modality: Nasogastric  Vital 1.5 [facility] (VITAL1.5RTH)  Total Volume for 24 Hours (mL): 240  Continuous  Starting Tube Feed Rate {mL per Hour}: 10  Until Goal Tube Feed Rate (mL per Hour): 10  Tube Feed Duration (in Hours): 24  Tube Feed Start Time: 10:00  Entered: Jan 18 2025  9:41AM  
This patient has been assessed with a concern for Malnutrition and has been determined to have a diagnosis/diagnoses of Severe protein-calorie malnutrition.    This patient is being managed with:   Diet NPO with Tube Feed-  Tube Feeding Modality: Nasogastric  Vital 1.5 [facility] (VITAL1.5RTH)  Total Volume for 24 Hours (mL): 480  Continuous  Starting Tube Feed Rate {mL per Hour}: 10  Increase Tube Feed Rate by (mL): 10     Every 4 hours  Until Goal Tube Feed Rate (mL per Hour): 20  Tube Feed Duration (in Hours): 24  Tube Feed Start Time: 15:30  Entered: Jan 13 2025  3:36PM  
This patient has been assessed with a concern for Malnutrition and has been determined to have a diagnosis/diagnoses of Severe protein-calorie malnutrition.    This patient is being managed with:   Diet NPO-  Entered: Dec 26 2024  9:51AM  
This patient has been assessed with a concern for Malnutrition and has been determined to have a diagnosis/diagnoses of Severe protein-calorie malnutrition.    This patient is being managed with:   Diet NPO-  Entered: Nov 29 2024  1:55PM  
This patient has been assessed with a concern for Malnutrition and has been determined to have a diagnosis/diagnoses of Severe protein-calorie malnutrition.    This patient is being managed with:   Diet NPO-  Except Medications  Entered: Dec  5 2024  8:18PM  
This patient has been assessed with a concern for Malnutrition and has been determined to have a diagnosis/diagnoses of Severe protein-calorie malnutrition.    This patient is being managed with:   Diet NPO-  Except Medications  Entered: Jan 11 2025  6:20AM  
This patient has been assessed with a concern for Malnutrition and has been determined to have a diagnosis/diagnoses of Severe protein-calorie malnutrition.    This patient is being managed with:   Diet NPO-  Except Medications  Entered: Jan 11 2025  6:20AM  
This patient has been assessed with a concern for Malnutrition and has been determined to have a diagnosis/diagnoses of Severe protein-calorie malnutrition.    This patient is being managed with:   Diet NPO-  Except Medications  Entered: Jan 17 2025  8:55PM  
This patient has been assessed with a concern for Malnutrition and has been determined to have a diagnosis/diagnoses of Severe protein-calorie malnutrition.    This patient is being managed with:   Diet NPO-  Except Medications  Entered: Jan 22 2025  6:33PM  
This patient has been assessed with a concern for Malnutrition and has been determined to have a diagnosis/diagnoses of Severe protein-calorie malnutrition.  
This patient has been assessed with a concern for Malnutrition and has been determined to have a diagnosis/diagnoses of Severe protein-calorie malnutrition.    This patient is being managed with:   Diet Minced and Moist-  Consistent Carbohydrate {Evening Snack} (CSTCHOSN)  Moderately Thick Liquids (MODTHICKLIQS)  Supplement Feeding Modality:  Oral  Ensure Max Cans or Servings Per Day:  2       Frequency:  Daily  Entered: Dec  4 2024  7:59AM  
This patient has been assessed with a concern for Malnutrition and has been determined to have a diagnosis/diagnoses of Severe protein-calorie malnutrition.    This patient is being managed with:   Diet NPO with Tube Feed-  Tube Feeding Modality: Nasogastric  Nepro with Carb Steady (NEPRORTH)  Total Volume for 24 Hours (mL): 1200  Continuous  Starting Tube Feed Rate {mL per Hour}: 40  Increase Tube Feed Rate by (mL): 10     Every 4 hours  Until Goal Tube Feed Rate (mL per Hour): 50  Tube Feed Duration (in Hours): 24  Tube Feed Start Time: 20:00  Entered: Dec 14 2024  8:50PM  
This patient has been assessed with a concern for Malnutrition and has been determined to have a diagnosis/diagnoses of Severe protein-calorie malnutrition.    This patient is being managed with:   Diet NPO with Tube Feed-  Tube Feeding Modality: Nasogastric  Nepro with Carb Steady (NEPRORTH)  Total Volume for 24 Hours (mL): 1200  Continuous  Starting Tube Feed Rate {mL per Hour}: 40  Increase Tube Feed Rate by (mL): 10     Every 4 hours  Until Goal Tube Feed Rate (mL per Hour): 50  Tube Feed Duration (in Hours): 24  Tube Feed Start Time: 20:00  Entered: Dec 14 2024  8:50PM  
This patient has been assessed with a concern for Malnutrition and has been determined to have a diagnosis/diagnoses of Severe protein-calorie malnutrition.    This patient is being managed with:   Diet NPO with Tube Feed-  Tube Feeding Modality: Nasogastric  Nepro with Carb Steady (NEPRORTH)  Total Volume for 24 Hours (mL): 1320  Continuous  Starting Tube Feed Rate {mL per Hour}: 55     Every 4 hours  Until Goal Tube Feed Rate (mL per Hour): 55  Tube Feed Duration (in Hours): 24  Tube Feed Start Time: 10:00  Shakira TF     Qty per Day:  3  Entered: Dec 31 2024  1:49PM  
This patient has been assessed with a concern for Malnutrition and has been determined to have a diagnosis/diagnoses of Severe protein-calorie malnutrition.    This patient is being managed with:   Diet NPO with Tube Feed-  Tube Feeding Modality: Nasogastric  Nepro with Carb Steady (NEPRORTH)  Total Volume for 24 Hours (mL): 1320  Continuous  Starting Tube Feed Rate {mL per Hour}: 55  Until Goal Tube Feed Rate (mL per Hour): 55  Tube Feed Duration (in Hours): 24  Entered: Dec 16 2024  2:05PM  
This patient has been assessed with a concern for Malnutrition and has been determined to have a diagnosis/diagnoses of Severe protein-calorie malnutrition.    This patient is being managed with:   Diet NPO with Tube Feed-  Tube Feeding Modality: Nasogastric  Nepro with Carb Steady (NEPRORTH)  Total Volume for 24 Hours (mL): 1320  Continuous  Starting Tube Feed Rate {mL per Hour}: 55  Until Goal Tube Feed Rate (mL per Hour): 55  Tube Feed Duration (in Hours): 24  Entered: Dec 16 2024  2:05PM  
This patient has been assessed with a concern for Malnutrition and has been determined to have a diagnosis/diagnoses of Severe protein-calorie malnutrition.    This patient is being managed with:   Diet NPO with Tube Feed-  Tube Feeding Modality: Nasogastric  Nepro with Carb Steady (NEPRORTH)  Total Volume for 24 Hours (mL): 720  Continuous  Starting Tube Feed Rate {mL per Hour}: 30  Until Goal Tube Feed Rate (mL per Hour): 30  Tube Feed Duration (in Hours): 24  Entered: Dec 13 2024  2:36PM  
This patient has been assessed with a concern for Malnutrition and has been determined to have a diagnosis/diagnoses of Severe protein-calorie malnutrition.    This patient is being managed with:   Diet NPO with Tube Feed-  Tube Feeding Modality: Nasogastric  Vital 1.5 [facility] (VITAL1.5RTH)  Total Volume for 24 Hours (mL): 1560  Continuous  Starting Tube Feed Rate {mL per Hour}: 65     Every 4 hours  Until Goal Tube Feed Rate (mL per Hour): 65  Tube Feed Duration (in Hours): 24  Tube Feed Start Time: 10:30  Alfredo(7 Gm Arginine/7 Gm Glut/1.2 Gm HMB     Qty per Day:  2  Banatrol TF     Qty per Day:  3  Entered: Giancarlo  3 2025 10:22AM  
This patient has been assessed with a concern for Malnutrition and has been determined to have a diagnosis/diagnoses of Severe protein-calorie malnutrition.    This patient is being managed with:   Diet NPO with Tube Feed-  Tube Feeding Modality: Nasogastric  Vital 1.5 [facility] (VITAL1.5RTH)  Total Volume for 24 Hours (mL): 1560  Continuous  Starting Tube Feed Rate {mL per Hour}: 65     Every 4 hours  Until Goal Tube Feed Rate (mL per Hour): 65  Tube Feed Duration (in Hours): 24  Tube Feed Start Time: 10:30  Alfredo(7 Gm Arginine/7 Gm Glut/1.2 Gm HMB     Qty per Day:  2  Banatrol TF     Qty per Day:  3  Entered: Giancarlo  3 2025 10:22AM  
This patient has been assessed with a concern for Malnutrition and has been determined to have a diagnosis/diagnoses of Severe protein-calorie malnutrition.    This patient is being managed with:   Diet NPO with Tube Feed-  Tube Feeding Modality: Nasogastric  Vital 1.5 [facility] (VITAL1.5RTH)  Total Volume for 24 Hours (mL): 720  Continuous  Starting Tube Feed Rate {mL per Hour}: 30  Until Goal Tube Feed Rate (mL per Hour): 30  Tube Feed Duration (in Hours): 24  Tube Feed Start Time: 12:00  Entered: Jan 20 2025 11:13PM  
This patient has been assessed with a concern for Malnutrition and has been determined to have a diagnosis/diagnoses of Severe protein-calorie malnutrition.    This patient is being managed with:   Diet NPO with Tube Feed-  Tube Feeding Modality: Orogastric  Nepro with Carb Steady (NEPRORTH)  Total Volume for 24 Hours (mL): 240  Continuous  Starting Tube Feed Rate {mL per Hour}: 10  Increase Tube Feed Rate by (mL): 0  Until Goal Tube Feed Rate (mL per Hour): 10  Tube Feed Duration (in Hours): 24  Tube Feed Start Time: 12:00  Entered: Dec 11 2024 11:06AM  
This patient has been assessed with a concern for Malnutrition and has been determined to have a diagnosis/diagnoses of Severe protein-calorie malnutrition.    This patient is being managed with:   Diet NPO with Tube Feed-  Tube Feeding Modality: Orogastric  Nepro with Carb Steady (NEPRORTH)  Total Volume for 24 Hours (mL): 720  Continuous  Starting Tube Feed Rate {mL per Hour}: 10  Increase Tube Feed Rate by (mL): 0  Until Goal Tube Feed Rate (mL per Hour): 30  Tube Feed Duration (in Hours): 24  Tube Feed Start Time: 12:00  Entered: Dec 13 2024 10:35AM  
This patient has been assessed with a concern for Malnutrition and has been determined to have a diagnosis/diagnoses of Severe protein-calorie malnutrition.    This patient is being managed with:   Diet NPO-  Entered: Dec 26 2024  9:51AM  
This patient has been assessed with a concern for Malnutrition and has been determined to have a diagnosis/diagnoses of Severe protein-calorie malnutrition.    This patient is being managed with:   Diet NPO-  Entered: Dec 26 2024  9:51AM  
This patient has been assessed with a concern for Malnutrition and has been determined to have a diagnosis/diagnoses of Severe protein-calorie malnutrition.    This patient is being managed with:   Diet NPO-  Except Medications  Entered: Dec 26 2024 12:52AM    Diet NPO after Midnight-     NPO Start Date: 25-Dec-2024   NPO Start Time: 23:59  Entered: Dec 25 2024  7:08PM  
This patient has been assessed with a concern for Malnutrition and has been determined to have a diagnosis/diagnoses of Severe protein-calorie malnutrition.    This patient is being managed with:   Diet NPO-  Except Medications  Entered: Jan 11 2025  6:20AM  
This patient has been assessed with a concern for Malnutrition and has been determined to have a diagnosis/diagnoses of Severe protein-calorie malnutrition.    This patient is being managed with:   Diet NPO-  Except Medications  Entered: Jan 11 2025  6:20AM  
This patient has been assessed with a concern for Malnutrition and has been determined to have a diagnosis/diagnoses of Severe protein-calorie malnutrition.  
This patient has been assessed with a concern for Malnutrition and has been determined to have a diagnosis/diagnoses of Severe protein-calorie malnutrition.    This patient is being managed with:   Diet NPO with Tube Feed-  Tube Feeding Modality: Nasogastric  Glucerna 1.5 [facility] (GLUCERNA1.5RTH)  Total Volume for 24 Hours (mL): 960  Continuous  Starting Tube Feed Rate {mL per Hour}: 40     Every 4 hours  Until Goal Tube Feed Rate (mL per Hour): 40  Tube Feed Duration (in Hours): 24  Tube Feed Start Time: 10:00  Entered: Nov 28 2024  9:50AM  
This patient has been assessed with a concern for Malnutrition and has been determined to have a diagnosis/diagnoses of Severe protein-calorie malnutrition.    This patient is being managed with:   Diet NPO with Tube Feed-  Tube Feeding Modality: Nasogastric  Nepro with Carb Steady (NEPRORTH)  Total Volume for 24 Hours (mL): 1200  Continuous  Starting Tube Feed Rate {mL per Hour}: 20  Increase Tube Feed Rate by (mL): 10     Every 4 hours  Until Goal Tube Feed Rate (mL per Hour): 50  Tube Feed Duration (in Hours): 24  Tube Feed Start Time: 10:00  Entered: Dec 28 2024 10:13AM  
This patient has been assessed with a concern for Malnutrition and has been determined to have a diagnosis/diagnoses of Severe protein-calorie malnutrition.    This patient is being managed with:   Diet NPO with Tube Feed-  Tube Feeding Modality: Nasogastric  Nepro with Carb Steady (NEPRORTH)  Total Volume for 24 Hours (mL): 1320  Continuous  Starting Tube Feed Rate {mL per Hour}: 30  Increase Tube Feed Rate by (mL): 10     Every 4 hours  Until Goal Tube Feed Rate (mL per Hour): 55  Tube Feed Duration (in Hours): 24  Tube Feed Start Time: 20:00  Entered: Dec 24 2024  2:17AM  
This patient has been assessed with a concern for Malnutrition and has been determined to have a diagnosis/diagnoses of Severe protein-calorie malnutrition.    This patient is being managed with:   Diet NPO with Tube Feed-  Tube Feeding Modality: Nasogastric  Nepro with Carb Steady (NEPRORTH)  Total Volume for 24 Hours (mL): 1320  Continuous  Starting Tube Feed Rate {mL per Hour}: 30  Increase Tube Feed Rate by (mL): 10     Every 4 hours  Until Goal Tube Feed Rate (mL per Hour): 55  Tube Feed Duration (in Hours): 24  Tube Feed Start Time: 20:00  Entered: Dec 24 2024  2:17AM  
This patient has been assessed with a concern for Malnutrition and has been determined to have a diagnosis/diagnoses of Severe protein-calorie malnutrition.    This patient is being managed with:   Diet NPO with Tube Feed-  Tube Feeding Modality: Nasogastric  Nepro with Carb Steady (NEPRORTH)  Total Volume for 24 Hours (mL): 1320  Continuous  Starting Tube Feed Rate {mL per Hour}: 55     Every 4 hours  Until Goal Tube Feed Rate (mL per Hour): 55  Tube Feed Duration (in Hours): 24  Tube Feed Start Time: 10:00  Shakira TF     Qty per Day:  3  Entered: Dec 31 2024  1:49PM  
This patient has been assessed with a concern for Malnutrition and has been determined to have a diagnosis/diagnoses of Severe protein-calorie malnutrition.    This patient is being managed with:   Diet NPO with Tube Feed-  Tube Feeding Modality: Nasogastric  Nepro with Carb Steady (NEPRORTH)  Total Volume for 24 Hours (mL): 1320  Continuous  Starting Tube Feed Rate {mL per Hour}: 55     Every 4 hours  Until Goal Tube Feed Rate (mL per Hour): 55  Tube Feed Duration (in Hours): 24  Tube Feed Start Time: 10:00  Shakira TF     Qty per Day:  3  Entered: Dec 31 2024  1:49PM  
This patient has been assessed with a concern for Malnutrition and has been determined to have a diagnosis/diagnoses of Severe protein-calorie malnutrition.    This patient is being managed with:   Diet NPO with Tube Feed-  Tube Feeding Modality: Nasogastric  Nepro with Carb Steady (NEPRORTH)  Total Volume for 24 Hours (mL): 240  Continuous  Starting Tube Feed Rate {mL per Hour}: 10  Until Goal Tube Feed Rate (mL per Hour): 10  Tube Feed Duration (in Hours): 24  Tube Feed Start Time: 10:30  Entered: Dec 27 2024 10:09AM  
This patient has been assessed with a concern for Malnutrition and has been determined to have a diagnosis/diagnoses of Severe protein-calorie malnutrition.    This patient is being managed with:   Diet NPO with Tube Feed-  Tube Feeding Modality: Nasogastric  Vital 1.5 [facility] (VITAL1.5RTH)  Total Volume for 24 Hours (mL): 1560  Continuous  Starting Tube Feed Rate {mL per Hour}: 65     Every 4 hours  Until Goal Tube Feed Rate (mL per Hour): 65  Tube Feed Duration (in Hours): 24  Tube Feed Start Time: 10:30  Alfredo(7 Gm Arginine/7 Gm Glut/1.2 Gm HMB     Qty per Day:  2  Banatrol TF     Qty per Day:  3  Entered: Giancarlo  3 2025 10:22AM  
This patient has been assessed with a concern for Malnutrition and has been determined to have a diagnosis/diagnoses of Severe protein-calorie malnutrition.    This patient is being managed with:   Diet NPO with Tube Feed-  Tube Feeding Modality: Nasogastric  Vital 1.5 [facility] (VITAL1.5RTH)  Total Volume for 24 Hours (mL): 240  Continuous  Starting Tube Feed Rate {mL per Hour}: 10  Until Goal Tube Feed Rate (mL per Hour): 10  Tube Feed Duration (in Hours): 24  Tube Feed Start Time: 09:30  Entered: Jan 23 2025  9:15AM  
This patient has been assessed with a concern for Malnutrition and has been determined to have a diagnosis/diagnoses of Severe protein-calorie malnutrition.    This patient is being managed with:   Diet NPO with Tube Feed-  Tube Feeding Modality: Nasogastric  Vital 1.5 [facility] (VITAL1.5RTH)  Total Volume for 24 Hours (mL): 480  Continuous  Starting Tube Feed Rate {mL per Hour}: 10  Increase Tube Feed Rate by (mL): 10     Every 4 hours  Until Goal Tube Feed Rate (mL per Hour): 20  Tube Feed Duration (in Hours): 24  Tube Feed Start Time: 15:30  Entered: Jan 13 2025  3:36PM  
This patient has been assessed with a concern for Malnutrition and has been determined to have a diagnosis/diagnoses of Severe protein-calorie malnutrition.    This patient is being managed with:   Diet NPO with Tube Feed-  Tube Feeding Modality: Nasogastric  Vital 1.5 [facility] (VITAL1.5RTH)  Total Volume for 24 Hours (mL): 480  Continuous  Starting Tube Feed Rate {mL per Hour}: 20  Until Goal Tube Feed Rate (mL per Hour): 20  Tube Feed Duration (in Hours): 24  Tube Feed Start Time: 12:00  Entered: Jan 19 2025 11:36AM  
This patient has been assessed with a concern for Malnutrition and has been determined to have a diagnosis/diagnoses of Severe protein-calorie malnutrition.    This patient is being managed with:   Diet NPO with Tube Feed-  Tube Feeding Modality: Nasogastric  Vital 1.5 Stanford (VITAL1.5RTH)  Total Volume for 24 Hours (mL): 720  Continuous  Starting Tube Feed Rate {mL per Hour}: 30  Until Goal Tube Feed Rate (mL per Hour): 30  Tube Feed Duration (in Hours): 24  Tube Feed Start Time: 12:00  Entered: Jan 16 2025 12:02PM  
This patient has been assessed with a concern for Malnutrition and has been determined to have a diagnosis/diagnoses of Severe protein-calorie malnutrition.    This patient is being managed with:   Diet NPO-  Entered: Dec  2 2024  9:38PM  
This patient has been assessed with a concern for Malnutrition and has been determined to have a diagnosis/diagnoses of Severe protein-calorie malnutrition.    This patient is being managed with:   Diet NPO-  Entered: Dec  7 2024 10:19AM  
This patient has been assessed with a concern for Malnutrition and has been determined to have a diagnosis/diagnoses of Severe protein-calorie malnutrition.    This patient is being managed with:   Diet NPO-  Entered: Nov 29 2024  1:55PM  
This patient has been assessed with a concern for Malnutrition and has been determined to have a diagnosis/diagnoses of Severe protein-calorie malnutrition.    This patient is being managed with:   Diet NPO-  Except Medications  Entered: Dec  5 2024  8:18PM  
This patient has been assessed with a concern for Malnutrition and has been determined to have a diagnosis/diagnoses of Severe protein-calorie malnutrition.    This patient is being managed with:   Diet NPO-  Except Medications  Entered: Dec 26 2024 12:52AM  
This patient has been assessed with a concern for Malnutrition and has been determined to have a diagnosis/diagnoses of Severe protein-calorie malnutrition.    This patient is being managed with:   Diet NPO-  Except Medications  Entered: Dec 26 2024 12:52AM    Diet NPO after Midnight-     NPO Start Date: 25-Dec-2024   NPO Start Time: 23:59  Entered: Dec 25 2024  7:08PM  
This patient has been assessed with a concern for Malnutrition and has been determined to have a diagnosis/diagnoses of Severe protein-calorie malnutrition.    This patient is being managed with:   Diet NPO-  Except Medications  Entered: Jan 11 2025  6:20AM  
This patient has been assessed with a concern for Malnutrition and has been determined to have a diagnosis/diagnoses of Severe protein-calorie malnutrition.    This patient is being managed with:   Diet NPO-  Except Medications  Entered: Jan 11 2025  6:20AM  
This patient has been assessed with a concern for Malnutrition and has been determined to have a diagnosis/diagnoses of Severe protein-calorie malnutrition.    This patient is being managed with:   Diet NPO with Tube Feed-  Tube Feeding Modality: Nasogastric  Nepro with Carb Steady (NEPRORTH)  Total Volume for 24 Hours (mL): 1320  Continuous  Starting Tube Feed Rate {mL per Hour}: 55  Until Goal Tube Feed Rate (mL per Hour): 55  Tube Feed Duration (in Hours): 24  Entered: Dec 16 2024  2:05PM  
This patient has been assessed with a concern for Malnutrition and has been determined to have a diagnosis/diagnoses of Severe protein-calorie malnutrition.    This patient is being managed with:   Diet NPO with Tube Feed-  Tube Feeding Modality: Nasogastric  Nepro with Carb Steady (NEPRORTH)  Total Volume for 24 Hours (mL): 1320  Continuous  Starting Tube Feed Rate {mL per Hour}: 55  Until Goal Tube Feed Rate (mL per Hour): 55  Tube Feed Duration (in Hours): 24  Entered: Dec 16 2024  2:05PM  
This patient has been assessed with a concern for Malnutrition and has been determined to have a diagnosis/diagnoses of Severe protein-calorie malnutrition.    This patient is being managed with:   Diet NPO with Tube Feed-  Tube Feeding Modality: Nasogastric  Nepro with Carb Steady (NEPRORTH)  Total Volume for 24 Hours (mL): 1320  Continuous  Starting Tube Feed Rate {mL per Hour}: 55  Until Goal Tube Feed Rate (mL per Hour): 55  Tube Feed Duration (in Hours): 24  Entered: Dec 22 2024  9:55AM  
This patient has been assessed with a concern for Malnutrition and has been determined to have a diagnosis/diagnoses of Severe protein-calorie malnutrition.    This patient is being managed with:   Diet NPO with Tube Feed-  Tube Feeding Modality: Nasogastric  Vital 1.5 [facility] (VITAL1.5RTH)  Total Volume for 24 Hours (mL): 1560  Continuous  Starting Tube Feed Rate {mL per Hour}: 65     Every 4 hours  Until Goal Tube Feed Rate (mL per Hour): 65  Tube Feed Duration (in Hours): 24  Tube Feed Start Time: 10:30  Alfredo(7 Gm Arginine/7 Gm Glut/1.2 Gm HMB     Qty per Day:  2  Banatrol TF     Qty per Day:  3  Entered: Giancarlo  3 2025 10:22AM  
This patient has been assessed with a concern for Malnutrition and has been determined to have a diagnosis/diagnoses of Severe protein-calorie malnutrition.    This patient is being managed with:   Diet NPO with Tube Feed-  Tube Feeding Modality: Nasogastric  Vital 1.5 [facility] (VITAL1.5RTH)  Total Volume for 24 Hours (mL): 1560  Continuous  Starting Tube Feed Rate {mL per Hour}: 65     Every 4 hours  Until Goal Tube Feed Rate (mL per Hour): 65  Tube Feed Duration (in Hours): 24  Tube Feed Start Time: 10:30  Alfredo(7 Gm Arginine/7 Gm Glut/1.2 Gm HMB     Qty per Day:  2  Banatrol TF     Qty per Day:  3  Entered: Giancarlo  3 2025 10:22AM  
This patient has been assessed with a concern for Malnutrition and has been determined to have a diagnosis/diagnoses of Severe protein-calorie malnutrition.    This patient is being managed with:   Diet NPO-  Entered: Dec  2 2024  9:38PM  
This patient has been assessed with a concern for Malnutrition and has been determined to have a diagnosis/diagnoses of Severe protein-calorie malnutrition.    This patient is being managed with:   Diet NPO-  Entered: Dec 26 2024  9:51AM  
This patient has been assessed with a concern for Malnutrition and has been determined to have a diagnosis/diagnoses of Severe protein-calorie malnutrition.    This patient is being managed with:   Diet NPO-  Except Medications  Entered: Jan 11 2025  6:20AM  
This patient has been assessed with a concern for Malnutrition and has been determined to have a diagnosis/diagnoses of Severe protein-calorie malnutrition.  
This patient has been assessed with a concern for Malnutrition and has been determined to have a diagnosis/diagnoses of Severe protein-calorie malnutrition.  
This patient has been assessed with a concern for Malnutrition and has been determined to have a diagnosis/diagnoses of Severe protein-calorie malnutrition.    This patient is being managed with:   Diet Minced and Moist-  Consistent Carbohydrate {Evening Snack} (CSTCHOSN)  Moderately Thick Liquids (MODTHICKLIQS)  Supplement Feeding Modality:  Oral  Ensure Max Cans or Servings Per Day:  2       Frequency:  Daily  Entered: Dec  4 2024  7:59AM  
This patient has been assessed with a concern for Malnutrition and has been determined to have a diagnosis/diagnoses of Severe protein-calorie malnutrition.    This patient is being managed with:   Diet Minced and Moist-  Moderately Thick Liquids (MODTHICKLIQS)  Supplement Feeding Modality:  Oral  Ensure Max Cans or Servings Per Day:  2       Frequency:  Daily  Entered: Dec  3 2024  1:36PM  
This patient has been assessed with a concern for Malnutrition and has been determined to have a diagnosis/diagnoses of Severe protein-calorie malnutrition.    This patient is being managed with:   Diet NPO with Tube Feed-  Tube Feeding Modality: Nasogastric  Glucerna 1.5 [facility] (GLUCERNA1.5RTH)  Total Volume for 24 Hours (mL): 1320  Continuous  Starting Tube Feed Rate {mL per Hour}: 30  Increase Tube Feed Rate by (mL): 10     Every 4 hours  Until Goal Tube Feed Rate (mL per Hour): 55  Tube Feed Duration (in Hours): 24  Tube Feed Start Time: 17:00  Entered: Nov 26 2024  5:14PM  
This patient has been assessed with a concern for Malnutrition and has been determined to have a diagnosis/diagnoses of Severe protein-calorie malnutrition.    This patient is being managed with:   Diet NPO with Tube Feed-  Tube Feeding Modality: Nasogastric  Glucerna 1.5 [facility] (GLUCERNA1.5RTH)  Total Volume for 24 Hours (mL): 720  Continuous  Starting Tube Feed Rate {mL per Hour}: 30     Every 4 hours  Until Goal Tube Feed Rate (mL per Hour): 30  Tube Feed Duration (in Hours): 24  Tube Feed Start Time: 11:00  Entered: Dec  1 2024  9:20PM  
This patient has been assessed with a concern for Malnutrition and has been determined to have a diagnosis/diagnoses of Severe protein-calorie malnutrition.    This patient is being managed with:   Diet NPO with Tube Feed-  Tube Feeding Modality: Nasogastric  Glucerna 1.5 [facility] (GLUCERNA1.5RTH)  Total Volume for 24 Hours (mL): 960  Continuous  Starting Tube Feed Rate {mL per Hour}: 40     Every 4 hours  Until Goal Tube Feed Rate (mL per Hour): 40  Tube Feed Duration (in Hours): 24  Tube Feed Start Time: 10:00  Entered: Nov 28 2024  9:50AM  
This patient has been assessed with a concern for Malnutrition and has been determined to have a diagnosis/diagnoses of Severe protein-calorie malnutrition.    This patient is being managed with:   Diet NPO with Tube Feed-  Tube Feeding Modality: Nasogastric  Nepro with Carb Steady (NEPRORTH)  Total Volume for 24 Hours (mL): 1320  Continuous  Starting Tube Feed Rate {mL per Hour}: 30  Increase Tube Feed Rate by (mL): 10     Every 4 hours  Until Goal Tube Feed Rate (mL per Hour): 55  Tube Feed Duration (in Hours): 24  Tube Feed Start Time: 20:00  Entered: Dec 24 2024  2:17AM  
This patient has been assessed with a concern for Malnutrition and has been determined to have a diagnosis/diagnoses of Severe protein-calorie malnutrition.    This patient is being managed with:   Diet NPO with Tube Feed-  Tube Feeding Modality: Nasogastric  Nepro with Carb Steady (NEPRORTH)  Total Volume for 24 Hours (mL): 1320  Continuous  Starting Tube Feed Rate {mL per Hour}: 30  Increase Tube Feed Rate by (mL): 10     Every 4 hours  Until Goal Tube Feed Rate (mL per Hour): 55  Tube Feed Duration (in Hours): 24  Tube Feed Start Time: 20:00  Entered: Dec 24 2024  2:17AM  
This patient has been assessed with a concern for Malnutrition and has been determined to have a diagnosis/diagnoses of Severe protein-calorie malnutrition.    This patient is being managed with:   Diet NPO with Tube Feed-  Tube Feeding Modality: Nasogastric  Nepro with Carb Steady (NEPRORTH)  Total Volume for 24 Hours (mL): 1320  Continuous  Starting Tube Feed Rate {mL per Hour}: 30  Increase Tube Feed Rate by (mL): 10  Until Goal Tube Feed Rate (mL per Hour): 55  Tube Feed Duration (in Hours): 24  Tube Feed Start Time: 20:00  Entered: Dec 23 2024  7:42PM  
This patient has been assessed with a concern for Malnutrition and has been determined to have a diagnosis/diagnoses of Severe protein-calorie malnutrition.    This patient is being managed with:   Diet NPO with Tube Feed-  Tube Feeding Modality: Nasogastric  Nepro with Carb Steady (NEPRORTH)  Total Volume for 24 Hours (mL): 1320  Continuous  Starting Tube Feed Rate {mL per Hour}: 55  Until Goal Tube Feed Rate (mL per Hour): 55  Tube Feed Duration (in Hours): 24  Entered: Dec 16 2024  2:05PM  
This patient has been assessed with a concern for Malnutrition and has been determined to have a diagnosis/diagnoses of Severe protein-calorie malnutrition.    This patient is being managed with:   Diet NPO with Tube Feed-  Tube Feeding Modality: Nasogastric  Nepro with Carb Steady (NEPRORTH)  Total Volume for 24 Hours (mL): 240  Continuous  Starting Tube Feed Rate {mL per Hour}: 10  Until Goal Tube Feed Rate (mL per Hour): 10  Tube Feed Duration (in Hours): 24  Tube Feed Start Time: 10:30  Entered: Dec 27 2024 10:09AM  
This patient has been assessed with a concern for Malnutrition and has been determined to have a diagnosis/diagnoses of Severe protein-calorie malnutrition.    This patient is being managed with:   Diet NPO with Tube Feed-  Tube Feeding Modality: Nasogastric  Nepro with Carb Steady (NEPRORTH)  Total Volume for 24 Hours (mL): 240  Continuous  Starting Tube Feed Rate {mL per Hour}: 10  Until Goal Tube Feed Rate (mL per Hour): 10  Tube Feed Duration (in Hours): 24  Tube Feed Start Time: 10:30  Entered: Dec 27 2024 10:09AM  
This patient has been assessed with a concern for Malnutrition and has been determined to have a diagnosis/diagnoses of Severe protein-calorie malnutrition.    This patient is being managed with:   Diet NPO with Tube Feed-  Tube Feeding Modality: Nasogastric  Nepro with Carb Steady (NEPRORTH)  Total Volume for 24 Hours (mL): 720  Continuous  Starting Tube Feed Rate {mL per Hour}: 30  Until Goal Tube Feed Rate (mL per Hour): 30  Tube Feed Duration (in Hours): 24  Entered: Dec 13 2024  2:36PM  
This patient has been assessed with a concern for Malnutrition and has been determined to have a diagnosis/diagnoses of Severe protein-calorie malnutrition.    This patient is being managed with:   Diet NPO with Tube Feed-  Tube Feeding Modality: Nasogastric  Vital 1.5 Stanford (VITAL1.5RTH)  Total Volume for 24 Hours (mL): 1560  Continuous  Starting Tube Feed Rate {mL per Hour}: 65     Every 4 hours  Until Goal Tube Feed Rate (mL per Hour): 65  Tube Feed Duration (in Hours): 24  Tube Feed Start Time: 10:30  Alfredo(7 Gm Arginine/7 Gm Glut/1.2 Gm HMB     Qty per Day:  2  Banatrol TF     Qty per Day:  3  Entered: Giancarlo  3 2025 10:22AM  
This patient has been assessed with a concern for Malnutrition and has been determined to have a diagnosis/diagnoses of Severe protein-calorie malnutrition.    This patient is being managed with:   Diet NPO with Tube Feed-  Tube Feeding Modality: Nasogastric  Vital 1.5 [facility] (VITAL1.5RTH)  Total Volume for 24 Hours (mL): 240  Continuous  Starting Tube Feed Rate {mL per Hour}: 10  Until Goal Tube Feed Rate (mL per Hour): 10  Tube Feed Duration (in Hours): 24  Tube Feed Start Time: 00:00  Entered: Jan 21 2025  8:12AM  
This patient has been assessed with a concern for Malnutrition and has been determined to have a diagnosis/diagnoses of Severe protein-calorie malnutrition.    This patient is being managed with:   Diet NPO with Tube Feed-  Tube Feeding Modality: Nasogastric  Vital 1.5 [facility] (VITAL1.5RTH)  Total Volume for 24 Hours (mL): 240  Continuous  Starting Tube Feed Rate {mL per Hour}: 10  Until Goal Tube Feed Rate (mL per Hour): 10  Tube Feed Duration (in Hours): 24  Tube Feed Start Time: 10:00  Entered: Jan 18 2025  9:41AM  
This patient has been assessed with a concern for Malnutrition and has been determined to have a diagnosis/diagnoses of Severe protein-calorie malnutrition.    This patient is being managed with:   Diet NPO with Tube Feed-  Tube Feeding Modality: Nasogastric  Vital 1.5 [facility] (VITAL1.5RTH)  Total Volume for 24 Hours (mL): 480  Continuous  Starting Tube Feed Rate {mL per Hour}: 10  Increase Tube Feed Rate by (mL): 10     Every 4 hours  Until Goal Tube Feed Rate (mL per Hour): 20  Tube Feed Duration (in Hours): 24  Tube Feed Start Time: 15:30  Entered: Jan 13 2025  3:36PM  
This patient has been assessed with a concern for Malnutrition and has been determined to have a diagnosis/diagnoses of Severe protein-calorie malnutrition.    This patient is being managed with:   Diet NPO with Tube Feed-  Tube Feeding Modality: Nasogastric  Vital 1.5 [facility] (VITAL1.5RTH)  Total Volume for 24 Hours (mL): 720  Continuous  Starting Tube Feed Rate {mL per Hour}: 30  Until Goal Tube Feed Rate (mL per Hour): 30  Tube Feed Duration (in Hours): 24  Tube Feed Start Time: 12:00  Entered: Jan 16 2025 12:02PM  
This patient has been assessed with a concern for Malnutrition and has been determined to have a diagnosis/diagnoses of Severe protein-calorie malnutrition.    This patient is being managed with:   Diet NPO with Tube Feed-  Tube Feeding Modality: Orogastric  Nepro with Carb Steady (NEPRORTH)  Total Volume for 24 Hours (mL): 240  Continuous  Starting Tube Feed Rate {mL per Hour}: 10  Increase Tube Feed Rate by (mL): 0  Until Goal Tube Feed Rate (mL per Hour): 10  Tube Feed Duration (in Hours): 24  Tube Feed Start Time: 12:00  Entered: Dec 11 2024 11:06AM  
This patient has been assessed with a concern for Malnutrition and has been determined to have a diagnosis/diagnoses of Severe protein-calorie malnutrition.    This patient is being managed with:   Diet NPO-  Entered: Dec 26 2024  9:51AM  
This patient has been assessed with a concern for Malnutrition and has been determined to have a diagnosis/diagnoses of Severe protein-calorie malnutrition.    This patient is being managed with:   Diet NPO-  Entered: Dec 26 2024  9:51AM  
This patient has been assessed with a concern for Malnutrition and has been determined to have a diagnosis/diagnoses of Severe protein-calorie malnutrition.    This patient is being managed with:   Diet NPO-  Entered: Nov 29 2024  1:55PM  
This patient has been assessed with a concern for Malnutrition and has been determined to have a diagnosis/diagnoses of Severe protein-calorie malnutrition.    This patient is being managed with:   Diet Minced and Moist-  Consistent Carbohydrate {Evening Snack} (CSTCHOSN)  Moderately Thick Liquids (MODTHICKLIQS)  Supplement Feeding Modality:  Oral  Ensure Max Cans or Servings Per Day:  2       Frequency:  Daily  Entered: Dec  4 2024  7:59AM

## 2025-01-26 LAB
-  CANDIDA GLABRATA: SIGNIFICANT CHANGE UP
GRAM STN FLD: ABNORMAL
METHOD TYPE: SIGNIFICANT CHANGE UP

## 2025-01-28 LAB
-  AMPHOTERICIN B: SIGNIFICANT CHANGE UP
-  ANIDULAFUNGIN: SIGNIFICANT CHANGE UP
-  MICAFUNGIN: SIGNIFICANT CHANGE UP
CULTURE RESULTS: SIGNIFICANT CHANGE UP
METHOD TYPE: SIGNIFICANT CHANGE UP
MISCELLANEOUS TEST NAME: SIGNIFICANT CHANGE UP
SPECIMEN SOURCE: SIGNIFICANT CHANGE UP

## 2025-01-30 LAB
-  CASPOFUNGIN: SIGNIFICANT CHANGE UP
-  FLUCONAZOLE: SIGNIFICANT CHANGE UP
-  POSACONAZOLE: SIGNIFICANT CHANGE UP
-  VORICONAZOLE: SIGNIFICANT CHANGE UP
CULTURE RESULTS: ABNORMAL
ORGANISM # SPEC MICROSCOPIC CNT: ABNORMAL
SPECIMEN SOURCE: SIGNIFICANT CHANGE UP

## 2025-06-26 NOTE — DIETITIAN INITIAL EVALUATION ADULT - ENTER TO (CAL/KG)
[facility]: Cancer Care                                                                                      RN Cancer Care Coordinator responding to MyC note.        Signature:  Blanquita Dang RN  
32

## (undated) DEVICE — CATH IV SAFE BC 22G X 1" (BLUE)

## (undated) DEVICE — SUT POLYSORB 3-0 30" V-20 UNDYED

## (undated) DEVICE — ELCTR DEFIB PAD PRO-PADZ W 10FT LEAD WIRES ADULT

## (undated) DEVICE — GLV 8.5 PROTEXIS (WHITE)

## (undated) DEVICE — SUT PDS II 3-0 36" SH

## (undated) DEVICE — GLV 6 PROTEXIS (WHITE)

## (undated) DEVICE — DRAPE IOBAN 33" X 23"

## (undated) DEVICE — SYR ALLIANCE II INFLATION 60ML

## (undated) DEVICE — SUT SOFSILK 3-0 18" V-20 (POP-OFF)

## (undated) DEVICE — POSITIONER FOAM EGG CRATE ULNAR 2PCS (PINK)

## (undated) DEVICE — SYR LUER LOK 10CC

## (undated) DEVICE — OSTOMY KIT 2-PIECE 2.25" NS (RED)

## (undated) DEVICE — DRAIN RESERVOIR FOR JACKSON PRATT 100CC CARDINAL

## (undated) DEVICE — SOL INJ NS 0.9% 500ML 2 PORT

## (undated) DEVICE — SUT MONOSOF 3-0 18" C-14

## (undated) DEVICE — FOLEY TRAY 16FR SURESTEP LF URINE METER TEMP SENSING STATLOCK

## (undated) DEVICE — LIGASURE IMPACT

## (undated) DEVICE — GOWN TRIMAX XXL

## (undated) DEVICE — BALLOON US ENDO

## (undated) DEVICE — SUT SOFSILK 4-0 18" TIES

## (undated) DEVICE — VESSEL LOOP MAXI-RED  0.120" X 16"

## (undated) DEVICE — SUT PROLENE 3-0 36" SH

## (undated) DEVICE — BAG DECANTER IV STERILE

## (undated) DEVICE — STAPLER ETHICON ECHELON FLEX 45MM X 340MM

## (undated) DEVICE — SUT SOFSILK 2-0 18" TIES

## (undated) DEVICE — DRAPE 1/2 SHEET 40X57"

## (undated) DEVICE — DRSG CURITY GAUZE SPONGE 4 X 4" 12-PLY

## (undated) DEVICE — DRAPE TOWEL BLUE 17" X 24"

## (undated) DEVICE — SUT PROLENE 4-0 36" SH

## (undated) DEVICE — DRAPE INSTRUMENT POUCH 6.75" X 11"

## (undated) DEVICE — SYR LUER LOK 20CC

## (undated) DEVICE — WARMING BLANKET UPPER ADULT

## (undated) DEVICE — GLV 6.5 PROTEXIS (WHITE)

## (undated) DEVICE — BITE BLOCK ADULT 20 X 27MM (GREEN)

## (undated) DEVICE — SENSOR O2 FINGER ADULT

## (undated) DEVICE — Device

## (undated) DEVICE — STAPLER SKIN VISI-STAT 35 WIDE

## (undated) DEVICE — SPECIMEN CONTAINER 100ML

## (undated) DEVICE — SUT SOFSILK 3-0 30" TIES

## (undated) DEVICE — FOLEY HOLDER STATLOCK 2 WAY ADULT

## (undated) DEVICE — GOWN TRIMAX LG

## (undated) DEVICE — DRAPE ISOLATION BAG 20X20"

## (undated) DEVICE — DRSG STERISTRIPS 0.5 X 4"

## (undated) DEVICE — PACK BASIN SPECIAL PROCEDURE

## (undated) DEVICE — NDL COUNTER FOAM AND MAGNET 40-70

## (undated) DEVICE — GLV 8 PROTEXIS (WHITE)

## (undated) DEVICE — PACK IV START WITH CHG

## (undated) DEVICE — LIGASURE EXACT DISSECTOR

## (undated) DEVICE — LAP PAD 18 X 18"

## (undated) DEVICE — ELCTR BOVIE PENCIL SMOKE EVACUATION

## (undated) DEVICE — SYR ASEPTO

## (undated) DEVICE — DRAPE MAYO STAND 30"

## (undated) DEVICE — VENODYNE/SCD SLEEVE CALF MEDIUM

## (undated) DEVICE — CATH NG SALEM SUMP 16FR

## (undated) DEVICE — CANNULA IRR ALCON ANTERIOR CHAMBER 27G

## (undated) DEVICE — DRSG TAPE MEDIPORE 3"

## (undated) DEVICE — GLV 7.5 PROTEXIS (BLUE)

## (undated) DEVICE — ELCTR BOVIE PENCIL HANDPIECE ROCKER SWITCH 15FT

## (undated) DEVICE — SUT SOFSILK 2-0 18" V-20 (POP-OFF)

## (undated) DEVICE — SUT PDS II 6-0 24" BV-1

## (undated) DEVICE — DRAPE ARMATEC MICROSSCOPE HANDLE 5" X 10"

## (undated) DEVICE — PREP CHLORAPREP HI-LITE ORANGE 26ML

## (undated) DEVICE — SUT BOOT STANDARD (ASSORTED) 5 PAIR

## (undated) DEVICE — MERCIAN VISABILITY BACKROUND YELLOW

## (undated) DEVICE — SUT SOFSILK 4-0 30" TIES

## (undated) DEVICE — SYR LUER LOK 3CC

## (undated) DEVICE — SUT PDS II 5-0 30" C-1

## (undated) DEVICE — SOL IRR POUR H2O 250ML

## (undated) DEVICE — PACK MAJOR ABDOMINAL SUPINE

## (undated) DEVICE — FOLEY TRAY 16FR 5CC LTX UMETER CLOSED

## (undated) DEVICE — LAP PAD 4 X 18"

## (undated) DEVICE — SUT SOFSILK 0 30" TIES

## (undated) DEVICE — DRSG XEROFORM 5 X 9"

## (undated) DEVICE — SUT PROLENE 6-0 30" C-1

## (undated) DEVICE — PACK CARDIOVASCULAR UNIVERSAL

## (undated) DEVICE — SUT PDS II PLUS 4-0 27" SH

## (undated) DEVICE — GLV 7 PROTEXIS (WHITE)

## (undated) DEVICE — DRAPE SLUSH / WARMER 44 X 66"

## (undated) DEVICE — GLV 7.5 PROTEXIS (WHITE)

## (undated) DEVICE — SUT SOFSILK 2-0 30" TIES

## (undated) DEVICE — TUBING SUCTION 20FT

## (undated) DEVICE — TUBING IV SET GRAVITY 3Y 100" MACRO

## (undated) DEVICE — CATH IV SAFE BC 20G X 1.16" (PINK)

## (undated) DEVICE — TUBING SUCTION CONN 6FT STERILE

## (undated) DEVICE — SOL IRR POUR NS 0.9% 500ML

## (undated) DEVICE — SUT SURGIPRO 1 60" GS-26

## (undated) DEVICE — PACK CV SPLIT PACK II

## (undated) DEVICE — DRSG TEGADERM 6 X 8"

## (undated) DEVICE — DRSG TEGADERM CHLORHEXIDINE 3.5 X 4.5"

## (undated) DEVICE — TUBING FLUID ADMINISTRATION SET PRIM 70"

## (undated) DEVICE — SUCTION YANKAUER NO CONTROL VENT

## (undated) DEVICE — SUT SOFSILK 0 18" TIES

## (undated) DEVICE — DRSG OPSITE 13.75 X 4"

## (undated) DEVICE — STOPCOCK 4-WAY W SWIVEL MALE LUER LOCK NON VENTED RED CAP

## (undated) DEVICE — ELCTR HANDPIECE ARGON BEND A BEAM 6"

## (undated) DEVICE — DRAPE C ARM UNIVERSAL

## (undated) DEVICE — BIPOLAR FORCEP CODMAN VERSATRU 8" X 0.5MM (BLUE)

## (undated) DEVICE — SUT PDS II 6-0 30" C-1

## (undated) DEVICE — BLADE SCALPEL SAFETYLOCK #11

## (undated) DEVICE — ELCTR BIPOLAR CORD J&J 12FT DISP

## (undated) DEVICE — WARMING BLANKET FULL UNDERBODY

## (undated) DEVICE — WARMING BLANKET LOWER ADULT

## (undated) DEVICE — DRSG MASTISOL

## (undated) DEVICE — SET CATH RIC RAPID INFUSION EXCHANGE 7FRX5.08CM

## (undated) DEVICE — SUCTION YANKAUER OPEN TIP NO VENT CURVE

## (undated) DEVICE — VISITEC 4X4